# Patient Record
Sex: FEMALE | Race: BLACK OR AFRICAN AMERICAN | NOT HISPANIC OR LATINO | URBAN - METROPOLITAN AREA
[De-identification: names, ages, dates, MRNs, and addresses within clinical notes are randomized per-mention and may not be internally consistent; named-entity substitution may affect disease eponyms.]

---

## 2017-01-28 ENCOUNTER — EMERGENCY (EMERGENCY)
Facility: HOSPITAL | Age: 52
LOS: 1 days | Discharge: HOME | End: 2017-01-28
Attending: EMERGENCY MEDICINE | Admitting: INTERNAL MEDICINE
Payer: MEDICARE

## 2017-01-28 VITALS
HEIGHT: 65 IN | OXYGEN SATURATION: 99 % | WEIGHT: 115.08 LBS | TEMPERATURE: 98 F | DIASTOLIC BLOOD PRESSURE: 64 MMHG | HEART RATE: 60 BPM | SYSTOLIC BLOOD PRESSURE: 127 MMHG | RESPIRATION RATE: 17 BRPM

## 2017-01-28 VITALS
OXYGEN SATURATION: 100 % | SYSTOLIC BLOOD PRESSURE: 119 MMHG | HEART RATE: 52 BPM | DIASTOLIC BLOOD PRESSURE: 81 MMHG | RESPIRATION RATE: 17 BRPM | TEMPERATURE: 98 F

## 2017-01-28 DIAGNOSIS — J45.909 UNSPECIFIED ASTHMA, UNCOMPLICATED: ICD-10-CM

## 2017-01-28 DIAGNOSIS — E78.5 HYPERLIPIDEMIA, UNSPECIFIED: ICD-10-CM

## 2017-01-28 DIAGNOSIS — Z98.890 OTHER SPECIFIED POSTPROCEDURAL STATES: ICD-10-CM

## 2017-01-28 DIAGNOSIS — Z79.82 LONG TERM (CURRENT) USE OF ASPIRIN: ICD-10-CM

## 2017-01-28 DIAGNOSIS — Z95.0 PRESENCE OF CARDIAC PACEMAKER: ICD-10-CM

## 2017-01-28 DIAGNOSIS — N83.201 UNSPECIFIED OVARIAN CYST, RIGHT SIDE: ICD-10-CM

## 2017-01-28 DIAGNOSIS — Z98.89 OTHER SPECIFIED POSTPROCEDURAL STATES: Chronic | ICD-10-CM

## 2017-01-28 DIAGNOSIS — N39.0 URINARY TRACT INFECTION, SITE NOT SPECIFIED: ICD-10-CM

## 2017-01-28 LAB
ALBUMIN SERPL ELPH-MCNC: 4.2 G/DL — SIGNIFICANT CHANGE UP (ref 3.5–5)
ALP SERPL-CCNC: 57 U/L — SIGNIFICANT CHANGE UP (ref 40–120)
ALT FLD-CCNC: 23 U/L DA — SIGNIFICANT CHANGE UP (ref 10–60)
ANION GAP SERPL CALC-SCNC: 7 MMOL/L — SIGNIFICANT CHANGE UP (ref 5–17)
APPEARANCE UR: CLEAR — SIGNIFICANT CHANGE UP
AST SERPL-CCNC: 21 U/L — SIGNIFICANT CHANGE UP (ref 10–40)
BASOPHILS # BLD AUTO: 0.1 K/UL — SIGNIFICANT CHANGE UP (ref 0–0.2)
BASOPHILS NFR BLD AUTO: 1.8 % — SIGNIFICANT CHANGE UP (ref 0–2)
BILIRUB SERPL-MCNC: 0.3 MG/DL — SIGNIFICANT CHANGE UP (ref 0.2–1.2)
BILIRUB UR-MCNC: NEGATIVE — SIGNIFICANT CHANGE UP
BUN SERPL-MCNC: 11 MG/DL — SIGNIFICANT CHANGE UP (ref 7–18)
CALCIUM SERPL-MCNC: 9.5 MG/DL — SIGNIFICANT CHANGE UP (ref 8.4–10.5)
CHLORIDE SERPL-SCNC: 106 MMOL/L — SIGNIFICANT CHANGE UP (ref 96–108)
CO2 SERPL-SCNC: 25 MMOL/L — SIGNIFICANT CHANGE UP (ref 22–31)
COLOR SPEC: YELLOW — SIGNIFICANT CHANGE UP
CREAT SERPL-MCNC: 0.83 MG/DL — SIGNIFICANT CHANGE UP (ref 0.5–1.3)
DIFF PNL FLD: ABNORMAL
EOSINOPHIL # BLD AUTO: 0.1 K/UL — SIGNIFICANT CHANGE UP (ref 0–0.5)
EOSINOPHIL NFR BLD AUTO: 2.4 % — SIGNIFICANT CHANGE UP (ref 0–6)
GLUCOSE SERPL-MCNC: 80 MG/DL — SIGNIFICANT CHANGE UP (ref 70–99)
GLUCOSE UR QL: NEGATIVE — SIGNIFICANT CHANGE UP
HCG UR QL: NEGATIVE — SIGNIFICANT CHANGE UP
HCT VFR BLD CALC: 37.9 % — SIGNIFICANT CHANGE UP (ref 34.5–45)
HGB BLD-MCNC: 11.8 G/DL — SIGNIFICANT CHANGE UP (ref 11.5–15.5)
KETONES UR-MCNC: NEGATIVE — SIGNIFICANT CHANGE UP
LEUKOCYTE ESTERASE UR-ACNC: ABNORMAL
LIDOCAIN IGE QN: 126 U/L — SIGNIFICANT CHANGE UP (ref 73–393)
LYMPHOCYTES # BLD AUTO: 1.7 K/UL — SIGNIFICANT CHANGE UP (ref 1–3.3)
LYMPHOCYTES # BLD AUTO: 52.5 % — HIGH (ref 13–44)
MCHC RBC-ENTMCNC: 25.4 PG — LOW (ref 27–34)
MCHC RBC-ENTMCNC: 31.1 GM/DL — LOW (ref 32–36)
MCV RBC AUTO: 81.6 FL — SIGNIFICANT CHANGE UP (ref 80–100)
MONOCYTES # BLD AUTO: 0.2 K/UL — SIGNIFICANT CHANGE UP (ref 0–0.9)
MONOCYTES NFR BLD AUTO: 6.8 % — SIGNIFICANT CHANGE UP (ref 2–14)
NEUTROPHILS # BLD AUTO: 1.2 K/UL — LOW (ref 1.8–7.4)
NEUTROPHILS NFR BLD AUTO: 36.4 % — LOW (ref 43–77)
NITRITE UR-MCNC: NEGATIVE — SIGNIFICANT CHANGE UP
PH UR: 6 — SIGNIFICANT CHANGE UP (ref 4.8–8)
PLATELET # BLD AUTO: 251 K/UL — SIGNIFICANT CHANGE UP (ref 150–400)
POTASSIUM SERPL-MCNC: 4.3 MMOL/L — SIGNIFICANT CHANGE UP (ref 3.5–5.3)
POTASSIUM SERPL-SCNC: 4.3 MMOL/L — SIGNIFICANT CHANGE UP (ref 3.5–5.3)
PROT SERPL-MCNC: 8.2 G/DL — SIGNIFICANT CHANGE UP (ref 6–8.3)
PROT UR-MCNC: 30 MG/DL
RBC # BLD: 4.65 M/UL — SIGNIFICANT CHANGE UP (ref 3.8–5.2)
RBC # FLD: 17.3 % — HIGH (ref 10.3–14.5)
SODIUM SERPL-SCNC: 138 MMOL/L — SIGNIFICANT CHANGE UP (ref 135–145)
SP GR SPEC: 1.02 — SIGNIFICANT CHANGE UP (ref 1.01–1.02)
UROBILINOGEN FLD QL: NEGATIVE — SIGNIFICANT CHANGE UP
WBC # BLD: 3.3 K/UL — LOW (ref 3.8–10.5)
WBC # FLD AUTO: 3.3 K/UL — LOW (ref 3.8–10.5)

## 2017-01-28 PROCEDURE — 76856 US EXAM PELVIC COMPLETE: CPT | Mod: 26

## 2017-01-28 PROCEDURE — 96375 TX/PRO/DX INJ NEW DRUG ADDON: CPT

## 2017-01-28 PROCEDURE — 83690 ASSAY OF LIPASE: CPT

## 2017-01-28 PROCEDURE — 96374 THER/PROPH/DIAG INJ IV PUSH: CPT

## 2017-01-28 PROCEDURE — 81025 URINE PREGNANCY TEST: CPT

## 2017-01-28 PROCEDURE — 85027 COMPLETE CBC AUTOMATED: CPT

## 2017-01-28 PROCEDURE — 81001 URINALYSIS AUTO W/SCOPE: CPT

## 2017-01-28 PROCEDURE — 76830 TRANSVAGINAL US NON-OB: CPT

## 2017-01-28 PROCEDURE — 99284 EMERGENCY DEPT VISIT MOD MDM: CPT | Mod: 25

## 2017-01-28 PROCEDURE — 76856 US EXAM PELVIC COMPLETE: CPT

## 2017-01-28 PROCEDURE — 80053 COMPREHEN METABOLIC PANEL: CPT

## 2017-01-28 PROCEDURE — 99285 EMERGENCY DEPT VISIT HI MDM: CPT | Mod: 25

## 2017-01-28 PROCEDURE — 76830 TRANSVAGINAL US NON-OB: CPT | Mod: 26

## 2017-01-28 RX ORDER — FAMOTIDINE 10 MG/ML
20 INJECTION INTRAVENOUS ONCE
Qty: 0 | Refills: 0 | Status: COMPLETED | OUTPATIENT
Start: 2017-01-28 | End: 2017-01-28

## 2017-01-28 RX ORDER — SODIUM CHLORIDE 9 MG/ML
3 INJECTION INTRAMUSCULAR; INTRAVENOUS; SUBCUTANEOUS ONCE
Qty: 0 | Refills: 0 | Status: COMPLETED | OUTPATIENT
Start: 2017-01-28 | End: 2017-01-28

## 2017-01-28 RX ORDER — ACETAMINOPHEN 500 MG
650 TABLET ORAL ONCE
Qty: 0 | Refills: 0 | Status: COMPLETED | OUTPATIENT
Start: 2017-01-28 | End: 2017-01-28

## 2017-01-28 RX ORDER — ONDANSETRON 8 MG/1
4 TABLET, FILM COATED ORAL ONCE
Qty: 0 | Refills: 0 | Status: COMPLETED | OUTPATIENT
Start: 2017-01-28 | End: 2017-01-28

## 2017-01-28 RX ORDER — AZTREONAM 2 G
1 VIAL (EA) INJECTION
Qty: 14 | Refills: 0
Start: 2017-01-28 | End: 2017-02-04

## 2017-01-28 RX ORDER — SODIUM CHLORIDE 9 MG/ML
1000 INJECTION INTRAMUSCULAR; INTRAVENOUS; SUBCUTANEOUS ONCE
Qty: 0 | Refills: 0 | Status: COMPLETED | OUTPATIENT
Start: 2017-01-28 | End: 2017-01-28

## 2017-01-28 RX ADMIN — FAMOTIDINE 20 MILLIGRAM(S): 10 INJECTION INTRAVENOUS at 08:31

## 2017-01-28 RX ADMIN — Medication 1 TABLET(S): at 11:27

## 2017-01-28 RX ADMIN — Medication 650 MILLIGRAM(S): at 08:31

## 2017-01-28 RX ADMIN — SODIUM CHLORIDE 3 MILLILITER(S): 9 INJECTION INTRAMUSCULAR; INTRAVENOUS; SUBCUTANEOUS at 08:31

## 2017-01-28 RX ADMIN — SODIUM CHLORIDE 1000 MILLILITER(S): 9 INJECTION INTRAMUSCULAR; INTRAVENOUS; SUBCUTANEOUS at 08:32

## 2017-01-28 RX ADMIN — ONDANSETRON 4 MILLIGRAM(S): 8 TABLET, FILM COATED ORAL at 08:31

## 2017-01-28 RX ADMIN — Medication 30 MILLILITER(S): at 08:33

## 2017-01-28 NOTE — ED PROVIDER NOTE - OBJECTIVE STATEMENT
50 y/o F from home with pt w/ PMHx of anemia (not on iron), asthma, HLD, GERD, gastritis, s/p pacemaker for sick sinus syndrome ([facility]) and PSHx of myomectomy presents to the ED complaining of Right pelvic pain x 1 month worsening today and nauseas so came to ED for treatment. Pain is constant, mostly pressure but sometimes sharp, non radiating, no diaphoresis. Pt denies fevers, chills, diarrhea, dysuria, change frequency of urination. No vag bleeding or discharge. Has been told in past that she had uterine fibroids s/p myomectomy. No trauma, no travel. No meds taken. Able to eat and drink.  LMP Fabrizio 15, 2017, does not think she is pregnant.

## 2017-01-28 NOTE — ED PROVIDER NOTE - PHYSICAL EXAMINATION
GENERAL: No acute distress, non toxic  HEAD: Atraumatic, normocephalic  EARS: Externally normal, atraumatic, TMs normal bilaterally  EYES: No jaundice, not injected, no rupture, no foreign bodies  MOUTH: Moist mucous membranes, no open lesion, uvula midline without edema, no exudates, no peritonsilar abscess bilaterally.  NECK: Supple, full range of motion, no swelling, no lymphadenopathy  HEART: Regular rate and rhythm, no murmurs, no rubs, no gallops  LUNGS: Clear to auscultation bilaterally without rhonci, rales, or wheezing  ABDOMEN: Soft and non tender, no rebound, no guarding, neg murphys, neg mcburneys. No masses.   BACK/SPINE: Non tender spine in cervical/thoracic/lumbar regions, no stepoffs palpable  EXTREMITIES: No gross deformities  VASCULAR: Pulses palpable in all extremities, no pitting edema, capillary refill <2 secs  SKIN: Grossly intact without rash or open wounds  PSYCH: Alert and oriented x 3  GAIT: Normal without need for assistance

## 2017-01-28 NOTE — ED PROVIDER NOTE - DIAGNOSIS COUNSELING, MDM
conducted a detailed discussion... I had a detailed discussion with the patient regarding the historical points, exam findings, and any diagnostic results supporting the discharge/admit diagnosis.

## 2017-01-28 NOTE — ED PROVIDER NOTE - PROGRESS NOTE DETAILS
MD Moreno: I assumed care of this patient from Dr. Morillo, briefly 51F with one month of R pelvic pain also with urinary frequency.  UA + UTI, given abx.  U/s with fibroid and ovarian cysts, which is likely etiology of pain.  Re-eval with improved pain, all ttp in pelvis, no ttp over McBurney's pt, will not obtain CT today.  Has gyn for f/u. FIONA Moreno MD

## 2017-01-28 NOTE — ED ADULT NURSE REASSESSMENT NOTE - NS ED NURSE REASSESS COMMENT FT1
RECEIVED PT ON ROUNDS A&O X 3. C/O RLQ ABDOMINAL PAIN X 1 MONTH. +NAUSEA. PT SEEN AND EVALUATED BY DR. TREJO. HL#20G INSERTED IN R AC. BLOOD DRAWN AND SENT TO LAB AS ORDERED. MEDICATED AS ORDERED. SONOGRAM AND CATSCAN PENDING. WILL CONT TO MONITOR RECEIVED PT ON ROUNDS A&O X 3. C/O RLQ ABDOMINAL PAIN X 1 MONTH. +NAUSEA. PT SEEN AND EVALUATED BY DR. TREJO. HL#20G INSERTED IN R AC. BLOOD DRAWN AND SENT TO LAB AS ORDERED. MEDICATED AS ORDERED. SONOGRAM AND CATSCAN PENDING. WILL CONT TO MONITOR. GASTROVIEW GIVEN AND TOLERATED AS ORDERED

## 2017-01-28 NOTE — ED PROVIDER NOTE - CARE PLAN
Principal Discharge DX:	Pelvic pain in female Principal Discharge DX:	Ovarian cyst  Instructions for follow-up, activity and diet:	Your ultrasound shows fibroids and ovarian cysts, you also have a UTI, take the antibiotics for one week as directed.  Please follow up with your OB/Gyn in 4-5 days.  Bring your ultrasound and lab results with you for your appointment.  If you develop heavy vaginal bleeding (over 2 pads an hour for 2 hours), lightheadedness, severe abdominal pain, fever, or any other concern, please return to the ER for further evaluation.  Secondary Diagnosis:	UTI (urinary tract infection)

## 2017-01-28 NOTE — ED PROVIDER NOTE - PLAN OF CARE
Your ultrasound shows fibroids and ovarian cysts, you also have a UTI, take the antibiotics for one week as directed.  Please follow up with your OB/Gyn in 4-5 days.  Bring your ultrasound and lab results with you for your appointment.  If you develop heavy vaginal bleeding (over 2 pads an hour for 2 hours), lightheadedness, severe abdominal pain, fever, or any other concern, please return to the ER for further evaluation.

## 2017-01-28 NOTE — ED ADULT NURSE NOTE - OBJECTIVE STATEMENT
51 years old female recievd sitiing in bed alert and orineted x3, breathing sponateoulsy on room air complained of perisitent, RLQ pians for a month.Patient stated that she is also experieing frequency of urination and yellow vaginal discharge.Patient also stated that 51 years old female received sitting in bed alert and oriented x3, breathing spontaneously on room air complained of persistent, RLQ pains for a month., same worsened today. Stated she had Fibroids removed before and was told recently that she has more fibroids in-situ.  Patient stated that she is also experiencing frequency of urination and yellow vaginal discharge. Patient also stated that she has a Pacemaker in-situ since 2010, and that visited [facility] last week Wednesday because said pacemaker was malfunctioning. ED physician is aware of patient.

## 2017-01-28 NOTE — ED PROVIDER NOTE - MEDICAL DECISION MAKING DETAILS
50 yo F with multiple medical problems that presents with right low abd vs right pelvic pain, on/off x 1 month worsening today with nausea. benign exam. Will obtain labs and imaging and provide pain meds. reassess.

## 2017-01-30 RX ORDER — CEFUROXIME AXETIL 250 MG
1 TABLET ORAL
Qty: 20 | Refills: 0 | OUTPATIENT
Start: 2017-01-30 | End: 2017-02-09

## 2017-02-07 ENCOUNTER — EMERGENCY (EMERGENCY)
Facility: HOSPITAL | Age: 52
LOS: 1 days | Discharge: HOME | End: 2017-02-07
Attending: EMERGENCY MEDICINE
Payer: MEDICARE

## 2017-02-07 VITALS
HEART RATE: 84 BPM | OXYGEN SATURATION: 100 % | RESPIRATION RATE: 16 BRPM | SYSTOLIC BLOOD PRESSURE: 104 MMHG | TEMPERATURE: 97 F | DIASTOLIC BLOOD PRESSURE: 58 MMHG

## 2017-02-07 VITALS
RESPIRATION RATE: 16 BRPM | SYSTOLIC BLOOD PRESSURE: 132 MMHG | HEART RATE: 54 BPM | TEMPERATURE: 99 F | DIASTOLIC BLOOD PRESSURE: 78 MMHG | HEIGHT: 65 IN | OXYGEN SATURATION: 99 % | WEIGHT: 110.01 LBS

## 2017-02-07 DIAGNOSIS — Z98.890 OTHER SPECIFIED POSTPROCEDURAL STATES: ICD-10-CM

## 2017-02-07 DIAGNOSIS — R11.0 NAUSEA: ICD-10-CM

## 2017-02-07 DIAGNOSIS — E78.5 HYPERLIPIDEMIA, UNSPECIFIED: ICD-10-CM

## 2017-02-07 DIAGNOSIS — J45.909 UNSPECIFIED ASTHMA, UNCOMPLICATED: ICD-10-CM

## 2017-02-07 DIAGNOSIS — R07.89 OTHER CHEST PAIN: ICD-10-CM

## 2017-02-07 DIAGNOSIS — Z98.89 OTHER SPECIFIED POSTPROCEDURAL STATES: Chronic | ICD-10-CM

## 2017-02-07 LAB
ALBUMIN SERPL ELPH-MCNC: 3.6 G/DL — SIGNIFICANT CHANGE UP (ref 3.5–5)
ALP SERPL-CCNC: 56 U/L — SIGNIFICANT CHANGE UP (ref 40–120)
ALT FLD-CCNC: 18 U/L DA — SIGNIFICANT CHANGE UP (ref 10–60)
ANION GAP SERPL CALC-SCNC: 5 MMOL/L — SIGNIFICANT CHANGE UP (ref 5–17)
APTT BLD: 27.8 SEC — SIGNIFICANT CHANGE UP (ref 27.5–37.4)
AST SERPL-CCNC: 22 U/L — SIGNIFICANT CHANGE UP (ref 10–40)
BASOPHILS # BLD AUTO: 0 K/UL — SIGNIFICANT CHANGE UP (ref 0–0.2)
BASOPHILS NFR BLD AUTO: 1.1 % — SIGNIFICANT CHANGE UP (ref 0–2)
BILIRUB SERPL-MCNC: 0.2 MG/DL — SIGNIFICANT CHANGE UP (ref 0.2–1.2)
BUN SERPL-MCNC: 13 MG/DL — SIGNIFICANT CHANGE UP (ref 7–18)
CALCIUM SERPL-MCNC: 9 MG/DL — SIGNIFICANT CHANGE UP (ref 8.4–10.5)
CHLORIDE SERPL-SCNC: 106 MMOL/L — SIGNIFICANT CHANGE UP (ref 96–108)
CO2 SERPL-SCNC: 28 MMOL/L — SIGNIFICANT CHANGE UP (ref 22–31)
CREAT SERPL-MCNC: 0.76 MG/DL — SIGNIFICANT CHANGE UP (ref 0.5–1.3)
EOSINOPHIL # BLD AUTO: 0.1 K/UL — SIGNIFICANT CHANGE UP (ref 0–0.5)
EOSINOPHIL NFR BLD AUTO: 3.2 % — SIGNIFICANT CHANGE UP (ref 0–6)
GLUCOSE SERPL-MCNC: 94 MG/DL — SIGNIFICANT CHANGE UP (ref 70–99)
HCT VFR BLD CALC: 34.4 % — LOW (ref 34.5–45)
HGB BLD-MCNC: 10.5 G/DL — LOW (ref 11.5–15.5)
INR BLD: 1.07 RATIO — SIGNIFICANT CHANGE UP (ref 0.88–1.16)
LYMPHOCYTES # BLD AUTO: 2 K/UL — SIGNIFICANT CHANGE UP (ref 1–3.3)
LYMPHOCYTES # BLD AUTO: 43.5 % — SIGNIFICANT CHANGE UP (ref 13–44)
MCHC RBC-ENTMCNC: 24.9 PG — LOW (ref 27–34)
MCHC RBC-ENTMCNC: 30.5 GM/DL — LOW (ref 32–36)
MCV RBC AUTO: 81.7 FL — SIGNIFICANT CHANGE UP (ref 80–100)
MONOCYTES # BLD AUTO: 0.4 K/UL — SIGNIFICANT CHANGE UP (ref 0–0.9)
MONOCYTES NFR BLD AUTO: 7.8 % — SIGNIFICANT CHANGE UP (ref 2–14)
NEUTROPHILS # BLD AUTO: 2 K/UL — SIGNIFICANT CHANGE UP (ref 1.8–7.4)
NEUTROPHILS NFR BLD AUTO: 44.4 % — SIGNIFICANT CHANGE UP (ref 43–77)
PLATELET # BLD AUTO: 254 K/UL — SIGNIFICANT CHANGE UP (ref 150–400)
POTASSIUM SERPL-MCNC: 4.3 MMOL/L — SIGNIFICANT CHANGE UP (ref 3.5–5.3)
POTASSIUM SERPL-SCNC: 4.3 MMOL/L — SIGNIFICANT CHANGE UP (ref 3.5–5.3)
PROT SERPL-MCNC: 7.5 G/DL — SIGNIFICANT CHANGE UP (ref 6–8.3)
PROTHROM AB SERPL-ACNC: 12 SEC — SIGNIFICANT CHANGE UP (ref 10–13.1)
RBC # BLD: 4.22 M/UL — SIGNIFICANT CHANGE UP (ref 3.8–5.2)
RBC # FLD: 17 % — HIGH (ref 10.3–14.5)
SODIUM SERPL-SCNC: 139 MMOL/L — SIGNIFICANT CHANGE UP (ref 135–145)
TROPONIN I SERPL-MCNC: <0.015 NG/ML — SIGNIFICANT CHANGE UP (ref 0–0.04)
WBC # BLD: 4.6 K/UL — SIGNIFICANT CHANGE UP (ref 3.8–10.5)
WBC # FLD AUTO: 4.6 K/UL — SIGNIFICANT CHANGE UP (ref 3.8–10.5)

## 2017-02-07 PROCEDURE — 85027 COMPLETE CBC AUTOMATED: CPT

## 2017-02-07 PROCEDURE — 99283 EMERGENCY DEPT VISIT LOW MDM: CPT

## 2017-02-07 PROCEDURE — 99285 EMERGENCY DEPT VISIT HI MDM: CPT

## 2017-02-07 PROCEDURE — 93005 ELECTROCARDIOGRAM TRACING: CPT

## 2017-02-07 PROCEDURE — 85610 PROTHROMBIN TIME: CPT

## 2017-02-07 PROCEDURE — 36000 PLACE NEEDLE IN VEIN: CPT

## 2017-02-07 PROCEDURE — 85730 THROMBOPLASTIN TIME PARTIAL: CPT

## 2017-02-07 PROCEDURE — 84484 ASSAY OF TROPONIN QUANT: CPT

## 2017-02-07 PROCEDURE — 80053 COMPREHEN METABOLIC PANEL: CPT

## 2017-02-07 NOTE — ED PROVIDER NOTE - OBJECTIVE STATEMENT
52 y/o female with PMHx of Asthma, HTN and Pacemaker presents to the ED c/o CP onset today. Pt reports she woke up with nausea and sharp mid upper CP. Pt denies trouble breathing, abd pain, vomiting, palpitations, or any other complaints. NKDA. Last stress test 1 year ago. PMD: Dr. Etienne. Last transfusion 3 years, takes iron.

## 2017-02-07 NOTE — ED PROVIDER NOTE - NS ED MD SCRIBE ATTENDING SCRIBE SECTIONS
VITAL SIGNS( Pullset)/HIV/HISTORY OF PRESENT ILLNESS/REVIEW OF SYSTEMS/DISPOSITION/PAST MEDICAL/SURGICAL/SOCIAL HISTORY

## 2017-02-07 NOTE — ED PROVIDER NOTE - MEDICAL DECISION MAKING DETAILS
Pt with atypical CP, normal stress test 1 year ago, will get EKG cardiac labs, and call Dr. Rosario. Pt with atypical CP, normal stress test 1 year ago, will get EKG cardiac labs, and call Dr. Rosario.  labs normal, pt feels better.  dr álvarez aware pt was here

## 2017-02-10 ENCOUNTER — EMERGENCY (EMERGENCY)
Facility: HOSPITAL | Age: 52
LOS: 1 days | Discharge: HOME | End: 2017-02-10
Attending: EMERGENCY MEDICINE
Payer: MEDICARE

## 2017-02-10 VITALS
DIASTOLIC BLOOD PRESSURE: 63 MMHG | HEART RATE: 62 BPM | RESPIRATION RATE: 16 BRPM | WEIGHT: 110.01 LBS | SYSTOLIC BLOOD PRESSURE: 121 MMHG | OXYGEN SATURATION: 100 % | TEMPERATURE: 98 F

## 2017-02-10 DIAGNOSIS — D21.9 BENIGN NEOPLASM OF CONNECTIVE AND OTHER SOFT TISSUE, UNSPECIFIED: ICD-10-CM

## 2017-02-10 DIAGNOSIS — J45.909 UNSPECIFIED ASTHMA, UNCOMPLICATED: ICD-10-CM

## 2017-02-10 DIAGNOSIS — R07.89 OTHER CHEST PAIN: ICD-10-CM

## 2017-02-10 DIAGNOSIS — D64.9 ANEMIA, UNSPECIFIED: ICD-10-CM

## 2017-02-10 DIAGNOSIS — Z79.82 LONG TERM (CURRENT) USE OF ASPIRIN: ICD-10-CM

## 2017-02-10 DIAGNOSIS — Z95.0 PRESENCE OF CARDIAC PACEMAKER: ICD-10-CM

## 2017-02-10 DIAGNOSIS — K29.70 GASTRITIS, UNSPECIFIED, WITHOUT BLEEDING: ICD-10-CM

## 2017-02-10 DIAGNOSIS — K21.9 GASTRO-ESOPHAGEAL REFLUX DISEASE WITHOUT ESOPHAGITIS: ICD-10-CM

## 2017-02-10 DIAGNOSIS — E78.5 HYPERLIPIDEMIA, UNSPECIFIED: ICD-10-CM

## 2017-02-10 DIAGNOSIS — Z98.89 OTHER SPECIFIED POSTPROCEDURAL STATES: Chronic | ICD-10-CM

## 2017-02-10 PROCEDURE — 99284 EMERGENCY DEPT VISIT MOD MDM: CPT | Mod: 25

## 2017-02-11 VITALS
SYSTOLIC BLOOD PRESSURE: 108 MMHG | HEART RATE: 56 BPM | RESPIRATION RATE: 16 BRPM | DIASTOLIC BLOOD PRESSURE: 61 MMHG | TEMPERATURE: 98 F | OXYGEN SATURATION: 100 %

## 2017-02-11 LAB
ALBUMIN SERPL ELPH-MCNC: 3.8 G/DL — SIGNIFICANT CHANGE UP (ref 3.5–5)
ALP SERPL-CCNC: 58 U/L — SIGNIFICANT CHANGE UP (ref 40–120)
ALT FLD-CCNC: 16 U/L DA — SIGNIFICANT CHANGE UP (ref 10–60)
ANION GAP SERPL CALC-SCNC: 7 MMOL/L — SIGNIFICANT CHANGE UP (ref 5–17)
AST SERPL-CCNC: 11 U/L — SIGNIFICANT CHANGE UP (ref 10–40)
BASOPHILS # BLD AUTO: 0 K/UL — SIGNIFICANT CHANGE UP (ref 0–0.2)
BASOPHILS NFR BLD AUTO: 1.2 % — SIGNIFICANT CHANGE UP (ref 0–2)
BILIRUB SERPL-MCNC: 0.1 MG/DL — LOW (ref 0.2–1.2)
BUN SERPL-MCNC: 15 MG/DL — SIGNIFICANT CHANGE UP (ref 7–18)
CALCIUM SERPL-MCNC: 8.5 MG/DL — SIGNIFICANT CHANGE UP (ref 8.4–10.5)
CHLORIDE SERPL-SCNC: 105 MMOL/L — SIGNIFICANT CHANGE UP (ref 96–108)
CK MB BLD-MCNC: <1.7 % — SIGNIFICANT CHANGE UP (ref 0–3.5)
CK MB CFR SERPL CALC: <1 NG/ML — SIGNIFICANT CHANGE UP (ref 0–3.6)
CK SERPL-CCNC: 60 U/L — SIGNIFICANT CHANGE UP (ref 21–215)
CO2 SERPL-SCNC: 29 MMOL/L — SIGNIFICANT CHANGE UP (ref 22–31)
CREAT SERPL-MCNC: 0.71 MG/DL — SIGNIFICANT CHANGE UP (ref 0.5–1.3)
D DIMER BLD IA.RAPID-MCNC: 153 NG/ML DDU — SIGNIFICANT CHANGE UP
EOSINOPHIL # BLD AUTO: 0.1 K/UL — SIGNIFICANT CHANGE UP (ref 0–0.5)
EOSINOPHIL NFR BLD AUTO: 2 % — SIGNIFICANT CHANGE UP (ref 0–6)
GLUCOSE SERPL-MCNC: 96 MG/DL — SIGNIFICANT CHANGE UP (ref 70–99)
HCT VFR BLD CALC: 29.8 % — LOW (ref 34.5–45)
HGB BLD-MCNC: 9.3 G/DL — LOW (ref 11.5–15.5)
LIDOCAIN IGE QN: 168 U/L — SIGNIFICANT CHANGE UP (ref 73–393)
LYMPHOCYTES # BLD AUTO: 1.9 K/UL — SIGNIFICANT CHANGE UP (ref 1–3.3)
LYMPHOCYTES # BLD AUTO: 46.6 % — HIGH (ref 13–44)
MCHC RBC-ENTMCNC: 25.4 PG — LOW (ref 27–34)
MCHC RBC-ENTMCNC: 31.1 GM/DL — LOW (ref 32–36)
MCV RBC AUTO: 81.4 FL — SIGNIFICANT CHANGE UP (ref 80–100)
MONOCYTES # BLD AUTO: 0.4 K/UL — SIGNIFICANT CHANGE UP (ref 0–0.9)
MONOCYTES NFR BLD AUTO: 8.6 % — SIGNIFICANT CHANGE UP (ref 2–14)
NEUTROPHILS # BLD AUTO: 1.7 K/UL — LOW (ref 1.8–7.4)
NEUTROPHILS NFR BLD AUTO: 41.5 % — LOW (ref 43–77)
PLATELET # BLD AUTO: 254 K/UL — SIGNIFICANT CHANGE UP (ref 150–400)
POTASSIUM SERPL-MCNC: 4 MMOL/L — SIGNIFICANT CHANGE UP (ref 3.5–5.3)
POTASSIUM SERPL-SCNC: 4 MMOL/L — SIGNIFICANT CHANGE UP (ref 3.5–5.3)
PROT SERPL-MCNC: 7.4 G/DL — SIGNIFICANT CHANGE UP (ref 6–8.3)
RBC # BLD: 3.67 M/UL — LOW (ref 3.8–5.2)
RBC # FLD: 16.5 % — HIGH (ref 10.3–14.5)
SODIUM SERPL-SCNC: 141 MMOL/L — SIGNIFICANT CHANGE UP (ref 135–145)
TROPONIN I SERPL-MCNC: <0.015 NG/ML — SIGNIFICANT CHANGE UP (ref 0–0.04)
WBC # BLD: 4.1 K/UL — SIGNIFICANT CHANGE UP (ref 3.8–10.5)
WBC # FLD AUTO: 4.1 K/UL — SIGNIFICANT CHANGE UP (ref 3.8–10.5)

## 2017-02-11 PROCEDURE — 85379 FIBRIN DEGRADATION QUANT: CPT

## 2017-02-11 PROCEDURE — 71020: CPT | Mod: 26

## 2017-02-11 PROCEDURE — 82553 CREATINE MB FRACTION: CPT

## 2017-02-11 PROCEDURE — 80053 COMPREHEN METABOLIC PANEL: CPT

## 2017-02-11 PROCEDURE — 85027 COMPLETE CBC AUTOMATED: CPT

## 2017-02-11 PROCEDURE — 84484 ASSAY OF TROPONIN QUANT: CPT

## 2017-02-11 PROCEDURE — 82550 ASSAY OF CK (CPK): CPT

## 2017-02-11 PROCEDURE — 71046 X-RAY EXAM CHEST 2 VIEWS: CPT

## 2017-02-11 PROCEDURE — 99283 EMERGENCY DEPT VISIT LOW MDM: CPT | Mod: 25

## 2017-02-11 PROCEDURE — 93005 ELECTROCARDIOGRAM TRACING: CPT

## 2017-02-11 PROCEDURE — 83690 ASSAY OF LIPASE: CPT

## 2017-02-11 RX ORDER — ACETAMINOPHEN 500 MG
650 TABLET ORAL ONCE
Qty: 0 | Refills: 0 | Status: COMPLETED | OUTPATIENT
Start: 2017-02-11 | End: 2017-02-11

## 2017-02-11 RX ORDER — SODIUM CHLORIDE 9 MG/ML
3 INJECTION INTRAMUSCULAR; INTRAVENOUS; SUBCUTANEOUS ONCE
Qty: 0 | Refills: 0 | Status: COMPLETED | OUTPATIENT
Start: 2017-02-11 | End: 2017-02-11

## 2017-02-11 RX ADMIN — Medication 650 MILLIGRAM(S): at 04:25

## 2017-02-11 RX ADMIN — SODIUM CHLORIDE 3 MILLILITER(S): 9 INJECTION INTRAMUSCULAR; INTRAVENOUS; SUBCUTANEOUS at 00:55

## 2017-02-11 NOTE — ED PROVIDER NOTE - NS ED MD SCRIBE ATTENDING SCRIBE SECTIONS
PAST MEDICAL/SURGICAL/SOCIAL HISTORY/HISTORY OF PRESENT ILLNESS/DISPOSITION/REVIEW OF SYSTEMS/VITAL SIGNS( Pullset)/PHYSICAL EXAM/HIV

## 2017-02-11 NOTE — ED PROVIDER NOTE - PHYSICAL EXAMINATION
MSK: (-) no chest wall tenderness, (-) no paraspinal tenderness  NEURO: NV intact, strength 5/5, sensation intact

## 2017-02-11 NOTE — ED PROVIDER NOTE - PROGRESS NOTE DETAILS
feeling better, asking tgh, lungs CTA b/l, labs, ekg, cxr all ok, will f/u w PMD, no current bleeding (menses ended yesterday, no black stools). VSS, taking iron, encouraged to take w Vitamin C for better absorption, will follow up with her PMD and GYN this week

## 2017-02-11 NOTE — ED PROVIDER NOTE - MEDICAL DECISION MAKING DETAILS
50 y/o F with CP and SOB; likely anxiety. Pt is well appearing, afebrile, and all vital signs WNL. Will order DDimer, EKG, CXR, Tylenol and reassess. 50 y/o F with CP and SOB; pt reports that she has these s/s every month at time of menses. possibly anxiety. Pt is well appearing, afebrile, and all vital signs WNL. Will order labs incl. DDimer, EKG, CXR, Tylenol and reassess.

## 2017-02-11 NOTE — ED PROVIDER NOTE - OBJECTIVE STATEMENT
50 y/o female with significant PMHx of pacemaker, asthma fibroids, anemia (on iron), GERD, gastritis, presents to the ED c/o CP and SOB x today. Pt reports L sided lower CP: not acute, pt relates CP occurs with menses. Pt with frequent visits to [facility] multiple times, for same complaint. Pt denies heavy lifting or strenuous activity, and relates that she experiences Sx every month during time of menses. Pt denies fever, pedal edema, diaphoresis, palpitations, calf pain, LOC, or any other complaints. NKDA. 52 y/o female with significant PMHx of pacemaker, asthma, fibroids, anemia (on iron), GERD, gastritis, presents to the ED c/o CP and SOB x today. Pt reports L sided lower CP: not acute, pt relates CP occurs with menses. Pt with frequent visits to [facility] multiple times, for same complaint. Pt denies heavy lifting or strenuous activity, and relates that she experiences Sx every month during time of menses. Pt states Sx does not feel like asthma. Pt denies Hx of intubation or hospitalization for asthma. Pt denies melena,  fever, pedal edema, diaphoresis, palpitations, calf pain, LOC, or any other complaints. Pt denies recent sick contacts, recent travel, long periods of immobilization or Hx of DVT/PE. NKDA. Pt relates abnormal bleeding secondary to fibroids that has needed hospitalization in the past. Was seen in [facility] many times for same Sx.  Pt was concerned for decreased in H&H; was 10 when she came in to ED, now is 9. Pt relates menses completed yesterday, pt was concerned for anemia. 50 y/o female with significant PMHx of pacemaker, asthma, fibroids, anemia (on iron), GERD, gastritis, presents to the ED c/o CP and SOB x today. Pt reports L sided lower CP: not acute, pt relates CP occurs every month with menses. Pt with frequent visits to [facility] multiple times, for same complaint. Pt denies heavy lifting or strenuous activity, and relates that she experiences Sx every month during time of menses. Pt states Sx does not feel like asthma. Pt denies Hx of intubation or hospitalization for asthma. Pt denies melena,  fever, pedal edema, diaphoresis, palpitations, calf pain, LOC, or any other complaints. Pt denies recent sick contacts, recent travel, long periods of immobilization or Hx of DVT/PE. NKDA. Pt relates abnormal bleeding secondary to fibroids that has needed hospitalization in the past. Was seen in [facility] many times for same Sx.  Pt was concerned for decreased in H&H; was 10 when she came in to ED, now is 9. Pt relates menses completed yesterday, pt was concerned for anemia.

## 2017-02-11 NOTE — ED PROVIDER NOTE - MUSCULOSKELETAL, MLM
Spine appears normal, range of motion is not limited, no muscle or joint tenderness Spine appears normal, range of motion is not limited, no muscle or joint tenderness, no chest wall ttp

## 2018-05-10 ENCOUNTER — EMERGENCY (EMERGENCY)
Facility: HOSPITAL | Age: 53
LOS: 1 days | Discharge: HOME | End: 2018-05-10
Attending: EMERGENCY MEDICINE | Admitting: EMERGENCY MEDICINE
Payer: MEDICARE

## 2018-05-10 VITALS
TEMPERATURE: 97 F | DIASTOLIC BLOOD PRESSURE: 61 MMHG | RESPIRATION RATE: 16 BRPM | SYSTOLIC BLOOD PRESSURE: 146 MMHG | OXYGEN SATURATION: 100 % | HEART RATE: 52 BPM

## 2018-05-10 VITALS
OXYGEN SATURATION: 100 % | RESPIRATION RATE: 16 BRPM | TEMPERATURE: 98 F | SYSTOLIC BLOOD PRESSURE: 124 MMHG | DIASTOLIC BLOOD PRESSURE: 69 MMHG | HEART RATE: 57 BPM

## 2018-05-10 DIAGNOSIS — Z98.89 OTHER SPECIFIED POSTPROCEDURAL STATES: Chronic | ICD-10-CM

## 2018-05-10 LAB
APPEARANCE UR: CLEAR — SIGNIFICANT CHANGE UP
BACTERIA # UR AUTO: SIGNIFICANT CHANGE UP
BILIRUB UR-MCNC: NEGATIVE — SIGNIFICANT CHANGE UP
BLOOD UR QL VISUAL: HIGH
COLOR SPEC: SIGNIFICANT CHANGE UP
GLUCOSE UR-MCNC: NEGATIVE — SIGNIFICANT CHANGE UP
KETONES UR-MCNC: NEGATIVE — SIGNIFICANT CHANGE UP
LEUKOCYTE ESTERASE UR-ACNC: NEGATIVE — SIGNIFICANT CHANGE UP
MUCOUS THREADS # UR AUTO: SIGNIFICANT CHANGE UP
NITRITE UR-MCNC: NEGATIVE — SIGNIFICANT CHANGE UP
PH UR: 7 — SIGNIFICANT CHANGE UP (ref 4.6–8)
PROT UR-MCNC: 20 MG/DL — SIGNIFICANT CHANGE UP
RBC CASTS # UR COMP ASSIST: SIGNIFICANT CHANGE UP (ref 0–?)
SP GR SPEC: 1.02 — SIGNIFICANT CHANGE UP (ref 1–1.04)
SQUAMOUS # UR AUTO: SIGNIFICANT CHANGE UP
UROBILINOGEN FLD QL: NORMAL MG/DL — SIGNIFICANT CHANGE UP
WBC UR QL: SIGNIFICANT CHANGE UP (ref 0–?)

## 2018-05-10 PROCEDURE — 99283 EMERGENCY DEPT VISIT LOW MDM: CPT | Mod: GC

## 2018-05-10 NOTE — ED PROVIDER NOTE - PROGRESS NOTE DETAILS
Jonah OLIVERA: I received sign out on this patient.  Labs reviewed, H/H WNL.  Will dc home with close GYN follow-up for further management of fibroids.

## 2018-05-10 NOTE — ED PROVIDER NOTE - ATTENDING CONTRIBUTION TO CARE
agree with resident note  52F h/o bradycardia s/p PPM, asthma, fibroids, anemia (Hb baseline 10, on iron transfusions), GERD, gastritis p/w vaginal bleeding and lower abdominal (suprapubic and R-side) and back pain x 4 days.  Concerned because has morning vaginal spotting.  States is being worked up for fibroids possibly being malignant.  Has a GYN and can follow up with her tomorrow (she is associated with [facility]) where she had her recent US done ( 3 weeks ago)    PE: well appearing; VSS; CTAB/L; s1 s2 no m/r/g abd soft/NT/ND ext: no edema

## 2018-05-10 NOTE — ED PROVIDER NOTE - OBJECTIVE STATEMENT
52F h/o bradycardia s/p PPM, asthma, fibroids, anemia (Hb baseline 10, on iron transfusions), GERD, gastritis p/w vaginal bleeding and lower abdominal (suprapubic and R-side) and back pain x 4 days. Vaginal bleeding only in the mornings. Did US 3 weeks with OBGYN and fibroid doubled in size. Molly dysuria, hematuria, fever, CP, SOB.     LMP 4/24/18

## 2018-05-10 NOTE — ED ADULT NURSE NOTE - OBJECTIVE STATEMENT
Ambulatory stating that she has had intermittent ABD pain that started Sunday, 7/10 at present. VSS. Every morning when she urinates she has nereida red blood when she wipes but doesn't see any blood for the rest of the day. States the pain in lower abdominal and also the L flank and RLQ. Denies Hx of stones or UTI's. Fibroid has doubled in size in the passed few weeks, was supposed to follow up with HemOnc. PMH anemia. Also had pacemaker inserted in 2010 due to bradycardia. Set to 50. VSS. MD Aponte at bedside. Continuous cardiac monitoring in place. Safety maintained, needs attended, will continue to Monitor.

## 2018-05-10 NOTE — ED ADULT TRIAGE NOTE - CHIEF COMPLAINT QUOTE
Pt st" I had abd pain on Sunday....I been bleeding early in am after urinating and then it stops."" I have fibroid that I was told is growing fast seen at [facility] last week....told I need to see hemoncology...I am anemia." " I am on Iron." " 3 weeks ago my HGB was 10"

## 2018-05-10 NOTE — ED ADULT NURSE NOTE - CHIEF COMPLAINT QUOTE
Pt st" I had abd pain on Sunday....I been bleeding early in am after urinating and then it stops."" I have fibroid that I was told is growing fast seen at [facility] last week....told I need to see hemoncology...I am anemia." " I am on Iron." " 3 weeks ago my HGB was 10"

## 2018-05-11 LAB
ALBUMIN SERPL ELPH-MCNC: 4.7 G/DL — SIGNIFICANT CHANGE UP (ref 3.3–5)
ALP SERPL-CCNC: 60 U/L — SIGNIFICANT CHANGE UP (ref 40–120)
ALT FLD-CCNC: 12 U/L — SIGNIFICANT CHANGE UP (ref 4–33)
AST SERPL-CCNC: 14 U/L — SIGNIFICANT CHANGE UP (ref 4–32)
BASOPHILS # BLD AUTO: 0.03 K/UL — SIGNIFICANT CHANGE UP (ref 0–0.2)
BASOPHILS NFR BLD AUTO: 0.8 % — SIGNIFICANT CHANGE UP (ref 0–2)
BILIRUB SERPL-MCNC: < 0.2 MG/DL — LOW (ref 0.2–1.2)
BLD GP AB SCN SERPL QL: NEGATIVE — SIGNIFICANT CHANGE UP
BUN SERPL-MCNC: 15 MG/DL — SIGNIFICANT CHANGE UP (ref 7–23)
CALCIUM SERPL-MCNC: 9.7 MG/DL — SIGNIFICANT CHANGE UP (ref 8.4–10.5)
CHLORIDE SERPL-SCNC: 102 MMOL/L — SIGNIFICANT CHANGE UP (ref 98–107)
CO2 SERPL-SCNC: 26 MMOL/L — SIGNIFICANT CHANGE UP (ref 22–31)
CREAT SERPL-MCNC: 0.92 MG/DL — SIGNIFICANT CHANGE UP (ref 0.5–1.3)
EOSINOPHIL # BLD AUTO: 0.1 K/UL — SIGNIFICANT CHANGE UP (ref 0–0.5)
EOSINOPHIL NFR BLD AUTO: 2.6 % — SIGNIFICANT CHANGE UP (ref 0–6)
GLUCOSE SERPL-MCNC: 94 MG/DL — SIGNIFICANT CHANGE UP (ref 70–99)
HCT VFR BLD CALC: 35 % — SIGNIFICANT CHANGE UP (ref 34.5–45)
HGB BLD-MCNC: 10.5 G/DL — LOW (ref 11.5–15.5)
IMM GRANULOCYTES # BLD AUTO: 0.02 # — SIGNIFICANT CHANGE UP
IMM GRANULOCYTES NFR BLD AUTO: 0.5 % — SIGNIFICANT CHANGE UP (ref 0–1.5)
LYMPHOCYTES # BLD AUTO: 1.87 K/UL — SIGNIFICANT CHANGE UP (ref 1–3.3)
LYMPHOCYTES # BLD AUTO: 48.1 % — HIGH (ref 13–44)
MAGNESIUM SERPL-MCNC: 2.3 MG/DL — SIGNIFICANT CHANGE UP (ref 1.6–2.6)
MCHC RBC-ENTMCNC: 26.2 PG — LOW (ref 27–34)
MCHC RBC-ENTMCNC: 30 % — LOW (ref 32–36)
MCV RBC AUTO: 87.3 FL — SIGNIFICANT CHANGE UP (ref 80–100)
MONOCYTES # BLD AUTO: 0.38 K/UL — SIGNIFICANT CHANGE UP (ref 0–0.9)
MONOCYTES NFR BLD AUTO: 9.8 % — SIGNIFICANT CHANGE UP (ref 2–14)
NEUTROPHILS # BLD AUTO: 1.49 K/UL — LOW (ref 1.8–7.4)
NEUTROPHILS NFR BLD AUTO: 38.2 % — LOW (ref 43–77)
NRBC # FLD: 0 — SIGNIFICANT CHANGE UP
PHOSPHATE SERPL-MCNC: 4 MG/DL — SIGNIFICANT CHANGE UP (ref 2.5–4.5)
PLATELET # BLD AUTO: 301 K/UL — SIGNIFICANT CHANGE UP (ref 150–400)
PMV BLD: 10.7 FL — SIGNIFICANT CHANGE UP (ref 7–13)
POTASSIUM SERPL-MCNC: 4.1 MMOL/L — SIGNIFICANT CHANGE UP (ref 3.5–5.3)
POTASSIUM SERPL-SCNC: 4.1 MMOL/L — SIGNIFICANT CHANGE UP (ref 3.5–5.3)
PROT SERPL-MCNC: 7.6 G/DL — SIGNIFICANT CHANGE UP (ref 6–8.3)
RBC # BLD: 4.01 M/UL — SIGNIFICANT CHANGE UP (ref 3.8–5.2)
RBC # FLD: 16.7 % — HIGH (ref 10.3–14.5)
RH IG SCN BLD-IMP: POSITIVE — SIGNIFICANT CHANGE UP
SODIUM SERPL-SCNC: 139 MMOL/L — SIGNIFICANT CHANGE UP (ref 135–145)
WBC # BLD: 3.89 K/UL — SIGNIFICANT CHANGE UP (ref 3.8–10.5)
WBC # FLD AUTO: 3.89 K/UL — SIGNIFICANT CHANGE UP (ref 3.8–10.5)

## 2018-05-14 ENCOUNTER — APPOINTMENT (OUTPATIENT)
Dept: OBGYN | Facility: HOSPITAL | Age: 53
End: 2018-05-14

## 2018-06-21 ENCOUNTER — RECORD ABSTRACTING (OUTPATIENT)
Age: 53
End: 2018-06-21

## 2018-06-22 ENCOUNTER — APPOINTMENT (OUTPATIENT)
Dept: GYNECOLOGIC ONCOLOGY | Facility: CLINIC | Age: 53
End: 2018-06-22
Payer: MEDICARE

## 2018-06-22 VITALS
HEIGHT: 65 IN | SYSTOLIC BLOOD PRESSURE: 132 MMHG | WEIGHT: 113 LBS | HEART RATE: 56 BPM | DIASTOLIC BLOOD PRESSURE: 73 MMHG | BODY MASS INDEX: 18.83 KG/M2

## 2018-06-22 DIAGNOSIS — Z80.9 FAMILY HISTORY OF MALIGNANT NEOPLASM, UNSPECIFIED: ICD-10-CM

## 2018-06-22 DIAGNOSIS — D25.9 LEIOMYOMA OF UTERUS, UNSPECIFIED: ICD-10-CM

## 2018-06-22 DIAGNOSIS — Z78.9 OTHER SPECIFIED HEALTH STATUS: ICD-10-CM

## 2018-06-22 DIAGNOSIS — N83.291 OTHER OVARIAN CYST, RIGHT SIDE: ICD-10-CM

## 2018-06-22 PROCEDURE — 99204 OFFICE O/P NEW MOD 45 MIN: CPT

## 2018-06-22 RX ORDER — NAPROXEN 500 MG/1
500 TABLET ORAL
Qty: 30 | Refills: 0 | Status: COMPLETED | COMMUNITY
Start: 2018-05-27 | End: 2018-06-22

## 2018-06-22 RX ORDER — BISACODYL 5 MG/1
5 TABLET ORAL
Qty: 2 | Refills: 0 | Status: COMPLETED | COMMUNITY
Start: 2018-04-16 | End: 2018-06-22

## 2018-06-22 RX ORDER — POLYETHYLENE GLYCOL-3350 AND ELECTROLYTES 236; 6.74; 5.86; 2.97; 22.74 G/274.31G; G/274.31G; G/274.31G; G/274.31G; G/274.31G
236 POWDER, FOR SOLUTION ORAL
Qty: 4000 | Refills: 0 | Status: COMPLETED | COMMUNITY
Start: 2018-06-03 | End: 2018-06-22

## 2018-09-21 ENCOUNTER — APPOINTMENT (OUTPATIENT)
Dept: GYNECOLOGIC ONCOLOGY | Facility: CLINIC | Age: 53
End: 2018-09-21

## 2018-12-03 ENCOUNTER — INPATIENT (INPATIENT)
Facility: HOSPITAL | Age: 53
LOS: 0 days | Discharge: HOME | DRG: 313 | End: 2018-12-04
Attending: INTERNAL MEDICINE | Admitting: INTERNAL MEDICINE
Payer: COMMERCIAL

## 2018-12-03 VITALS
OXYGEN SATURATION: 99 % | DIASTOLIC BLOOD PRESSURE: 80 MMHG | SYSTOLIC BLOOD PRESSURE: 129 MMHG | RESPIRATION RATE: 17 BRPM | TEMPERATURE: 98 F | HEART RATE: 51 BPM

## 2018-12-03 DIAGNOSIS — D64.9 ANEMIA, UNSPECIFIED: ICD-10-CM

## 2018-12-03 DIAGNOSIS — E78.5 HYPERLIPIDEMIA, UNSPECIFIED: ICD-10-CM

## 2018-12-03 DIAGNOSIS — I24.9 ACUTE ISCHEMIC HEART DISEASE, UNSPECIFIED: ICD-10-CM

## 2018-12-03 DIAGNOSIS — J45.909 UNSPECIFIED ASTHMA, UNCOMPLICATED: ICD-10-CM

## 2018-12-03 DIAGNOSIS — Z98.89 OTHER SPECIFIED POSTPROCEDURAL STATES: Chronic | ICD-10-CM

## 2018-12-03 DIAGNOSIS — R07.9 CHEST PAIN, UNSPECIFIED: ICD-10-CM

## 2018-12-03 DIAGNOSIS — Z95.0 PRESENCE OF CARDIAC PACEMAKER: ICD-10-CM

## 2018-12-03 DIAGNOSIS — Z29.9 ENCOUNTER FOR PROPHYLACTIC MEASURES, UNSPECIFIED: ICD-10-CM

## 2018-12-03 LAB
ALBUMIN SERPL ELPH-MCNC: 3.8 G/DL — SIGNIFICANT CHANGE UP (ref 3.5–5)
ALP SERPL-CCNC: 55 U/L — SIGNIFICANT CHANGE UP (ref 40–120)
ALT FLD-CCNC: 34 U/L DA — SIGNIFICANT CHANGE UP (ref 10–60)
ANION GAP SERPL CALC-SCNC: 5 MMOL/L — SIGNIFICANT CHANGE UP (ref 5–17)
APTT BLD: 28 SEC — SIGNIFICANT CHANGE UP (ref 27.5–36.3)
AST SERPL-CCNC: 15 U/L — SIGNIFICANT CHANGE UP (ref 10–40)
BASOPHILS # BLD AUTO: 0 K/UL — SIGNIFICANT CHANGE UP (ref 0–0.2)
BASOPHILS NFR BLD AUTO: 0.9 % — SIGNIFICANT CHANGE UP (ref 0–2)
BILIRUB SERPL-MCNC: 0.2 MG/DL — SIGNIFICANT CHANGE UP (ref 0.2–1.2)
BUN SERPL-MCNC: 16 MG/DL — SIGNIFICANT CHANGE UP (ref 7–18)
CALCIUM SERPL-MCNC: 9.5 MG/DL — SIGNIFICANT CHANGE UP (ref 8.4–10.5)
CHLORIDE SERPL-SCNC: 105 MMOL/L — SIGNIFICANT CHANGE UP (ref 96–108)
CO2 SERPL-SCNC: 28 MMOL/L — SIGNIFICANT CHANGE UP (ref 22–31)
CREAT SERPL-MCNC: 0.75 MG/DL — SIGNIFICANT CHANGE UP (ref 0.5–1.3)
EOSINOPHIL # BLD AUTO: 0.1 K/UL — SIGNIFICANT CHANGE UP (ref 0–0.5)
EOSINOPHIL NFR BLD AUTO: 2.6 % — SIGNIFICANT CHANGE UP (ref 0–6)
GLUCOSE SERPL-MCNC: 74 MG/DL — SIGNIFICANT CHANGE UP (ref 70–99)
HCT VFR BLD CALC: 42.2 % — SIGNIFICANT CHANGE UP (ref 34.5–45)
HGB BLD-MCNC: 14 G/DL — SIGNIFICANT CHANGE UP (ref 11.5–15.5)
INR BLD: 1.1 RATIO — SIGNIFICANT CHANGE UP (ref 0.88–1.16)
LYMPHOCYTES # BLD AUTO: 2.1 K/UL — SIGNIFICANT CHANGE UP (ref 1–3.3)
LYMPHOCYTES # BLD AUTO: 51 % — HIGH (ref 13–44)
MCHC RBC-ENTMCNC: 30.9 PG — SIGNIFICANT CHANGE UP (ref 27–34)
MCHC RBC-ENTMCNC: 33.3 GM/DL — SIGNIFICANT CHANGE UP (ref 32–36)
MCV RBC AUTO: 92.8 FL — SIGNIFICANT CHANGE UP (ref 80–100)
MONOCYTES # BLD AUTO: 0.3 K/UL — SIGNIFICANT CHANGE UP (ref 0–0.9)
MONOCYTES NFR BLD AUTO: 7.2 % — SIGNIFICANT CHANGE UP (ref 2–14)
NEUTROPHILS # BLD AUTO: 1.6 K/UL — LOW (ref 1.8–7.4)
NEUTROPHILS NFR BLD AUTO: 38.3 % — LOW (ref 43–77)
NT-PROBNP SERPL-SCNC: 12 PG/ML — SIGNIFICANT CHANGE UP (ref 0–125)
PLATELET # BLD AUTO: 220 K/UL — SIGNIFICANT CHANGE UP (ref 150–400)
POTASSIUM SERPL-MCNC: 4.2 MMOL/L — SIGNIFICANT CHANGE UP (ref 3.5–5.3)
POTASSIUM SERPL-SCNC: 4.2 MMOL/L — SIGNIFICANT CHANGE UP (ref 3.5–5.3)
PROT SERPL-MCNC: 7.2 G/DL — SIGNIFICANT CHANGE UP (ref 6–8.3)
PROTHROM AB SERPL-ACNC: 12.3 SEC — SIGNIFICANT CHANGE UP (ref 10–12.9)
RBC # BLD: 4.55 M/UL — SIGNIFICANT CHANGE UP (ref 3.8–5.2)
RBC # FLD: 12 % — SIGNIFICANT CHANGE UP (ref 10.3–14.5)
SODIUM SERPL-SCNC: 138 MMOL/L — SIGNIFICANT CHANGE UP (ref 135–145)
TROPONIN I SERPL-MCNC: <0.015 NG/ML — SIGNIFICANT CHANGE UP (ref 0–0.04)
WBC # BLD: 4.2 K/UL — SIGNIFICANT CHANGE UP (ref 3.8–10.5)
WBC # FLD AUTO: 4.2 K/UL — SIGNIFICANT CHANGE UP (ref 3.8–10.5)

## 2018-12-03 PROCEDURE — 71046 X-RAY EXAM CHEST 2 VIEWS: CPT | Mod: 26

## 2018-12-03 PROCEDURE — 99285 EMERGENCY DEPT VISIT HI MDM: CPT

## 2018-12-03 RX ORDER — ALBUTEROL 90 UG/1
2 AEROSOL, METERED ORAL EVERY 6 HOURS
Qty: 0 | Refills: 0 | Status: DISCONTINUED | OUTPATIENT
Start: 2018-12-03 | End: 2018-12-04

## 2018-12-03 RX ORDER — ASPIRIN/CALCIUM CARB/MAGNESIUM 324 MG
81 TABLET ORAL DAILY
Qty: 0 | Refills: 0 | Status: DISCONTINUED | OUTPATIENT
Start: 2018-12-03 | End: 2018-12-04

## 2018-12-03 RX ORDER — PANTOPRAZOLE SODIUM 20 MG/1
40 TABLET, DELAYED RELEASE ORAL
Qty: 0 | Refills: 0 | Status: DISCONTINUED | OUTPATIENT
Start: 2018-12-03 | End: 2018-12-04

## 2018-12-03 RX ORDER — ATORVASTATIN CALCIUM 80 MG/1
40 TABLET, FILM COATED ORAL AT BEDTIME
Qty: 0 | Refills: 0 | Status: DISCONTINUED | OUTPATIENT
Start: 2018-12-03 | End: 2018-12-04

## 2018-12-03 RX ORDER — ASPIRIN/CALCIUM CARB/MAGNESIUM 324 MG
162 TABLET ORAL ONCE
Qty: 0 | Refills: 0 | Status: COMPLETED | OUTPATIENT
Start: 2018-12-03 | End: 2018-12-03

## 2018-12-03 NOTE — H&P ADULT - ASSESSMENT
Pt is a 53 y/o F from home with pt w/ PMHx of anemia, asthma, HLD, GERD, gastritis, s/p pacemaker for sick sinus syndrome ([facility], checked 1 year ago) and PSHx of myomectomy presents to the ED complaining of CP x 4 days.  In the ED, pt presents in no acute distress, vitals WNL, and EKG NSR.  Pt remains afebrile w/o leukocytosis.  Remaining labs WNL.  CXR clear.  Echo 2016 NSVF EF 55-60% w/ GII DD.  Given pts sig comorbidities, ACS should be ruled out.      Pt will be admitted to tele for ACS to r/o NSTEMI vs unstable angina

## 2018-12-03 NOTE — ED PROVIDER NOTE - OBJECTIVE STATEMENT
53 y/o F w/ PMHx of asthma, bradycardia, w/ pacemaker, HLD, anemia presents to ED c/o chest pain x four days, worsening today, now radiating to L arm and jaw. Pt has not had symptoms like this in the past. Pt has active chest pain. Denies any shortness of breath, cough, or any other complaints. NKDA.

## 2018-12-03 NOTE — ED PROVIDER NOTE - MEDICAL DECISION MAKING DETAILS
53 y/o F here w/ chest pain x 4 days, worsening. Pt w/ risk factors, will check labs, EKG and likely admit.

## 2018-12-03 NOTE — ED STATDOCS - OBJECTIVE STATEMENT
Telemedicine assessment was conducted (using real time 2 way audio-video technology) by Dr. Peterson located at [address] [place] [id number]  +++++++++++++++++++++++++++++++++++++++++++++++++++  History and Plan: 51 y/o F pt with PMHx of pacemaker presents to ED c/o chest pain x 4 days, now with shortness of breath. Pt describes pain as sharp, pulling, intermittent, and radiating to shoulder and face. Pt also reports nausea, lightheadedness, and dizziness. NKDA. Telemedicine assessment was conducted (using real time 2 way audio-video technology) by Dr. Peterson located at [address] [place] [id number]  +++++++++++++++++++++++++++++++++++++++++++++++++++  History and Plan: 53 y/o F pt with PMHx of PPM due to bradycardia, presents to ED c/o chest pain x 4 days, now with shortness of breath. Pt describes pain as pressure, left sided, radiating to LUE and jaw. Pt also reports nausea, lightheadedness, SOB and dizziness with the pain. NKDA.    On virtual exam pt is well appearing, nad, ekg unremarkable    Plan - Pillo

## 2018-12-03 NOTE — H&P ADULT - PROBLEM SELECTOR PLAN 1
-CP x 2 days  -EKG sinus paced  -Trop 1 neg; f/u T2 in AM, and T3 at noon  -Asa, statin, b-blocker  -serial EKG's  -Tele monitoring  -f/u Echo  -Cardio: Dr. Barksdale -CP x 2 days  -EKG sinus paced  -Trop 1 neg; f/u T2 in AM, and T3 at noon  -Asa, statin, hold b-blocker w/ concern for bradycardia   -serial EKG's  -Tele monitoring  -f/u Echo  -Cardio: Dr. Barksdale -CP x 2 days  -EKG sinus paced  -Trop 1 neg; f/u T2 in AM, and T3 at noon  -Asa, statin, hold b-blocker w/ concern for bradycardia   -serial EKG's  -Tele monitoring  -f/u Echo and stress test  -Cardio: Dr. Barksdale

## 2018-12-03 NOTE — H&P ADULT - NSHPPHYSICALEXAM_GEN_ALL_CORE
Vital Signs Last 24 Hrs  T(C): 36.7 (03 Dec 2018 18:21), Max: 36.7 (03 Dec 2018 18:21)  T(F): 98 (03 Dec 2018 18:21), Max: 98 (03 Dec 2018 18:21)  HR: 51 (03 Dec 2018 18:21) (51 - 51)  BP: 129/80 (03 Dec 2018 18:21) (129/80 - 129/80)  RR: 17 (03 Dec 2018 18:21) (17 - 17)  SpO2: 99% (03 Dec 2018 18:21) (99% - 99%)

## 2018-12-03 NOTE — H&P ADULT - PROBLEM SELECTOR PLAN 2
-[facility]- last interrogated -[facility]- last interrogated in Nov w/ Dr. Rosario  -consider repeat interrogation, or attain prior report

## 2018-12-03 NOTE — H&P ADULT - HISTORY OF PRESENT ILLNESS
Pt is a 53 y/o F from home with pt w/ PMHx of anemia, asthma, HLD, GERD, gastritis, s/p pacemaker for sick sinus syndrome ([facility], checked 1 year ago) and PSHx of myomectomy presents to the ED complaining of Pt is a 51 y/o F from home with pt w/ PMHx of anemia, asthma, HLD, GERD, gastritis, s/p pacemaker for sick sinus syndrome ([facility], checked 1 year ago) and PSHx of myomectomy presents to the ED complaining of CP x 4 days.  Pt describes the discomfort as sharp, substernal, radiating to lt side, and shoulder, w/ assoc nausea.  Pt took aspirin which provided some relief.  Pt states she had an appt with Dr. Miky Rosario today, but canceled due to worsening CP.  Pt states she had a similar episode last summer, but it resolved with aspirin.  Pt reports a negative stress test with Dr. Rosario earlier this year.  Pt's pacemaker was last interrogated in Nov.  Pt denies palpitations, dizziness, blurred vision, extremity edema, HA, abd pain, V/D, or syncope.     In the ED, pt presents in no acute distress, vitals WNL, and EKG NSR

## 2018-12-03 NOTE — H&P ADULT - NEUROLOGICAL DETAILS
responds to verbal commands/responds to pain/sensation intact/alert and oriented x 3/cranial nerves intact

## 2018-12-03 NOTE — H&P ADULT - RS GEN PE MLT RESP DETAILS PC
no rales/no wheezes/good air movement/breath sounds equal/airway patent/no rhonchi/respirations non-labored/clear to auscultation bilaterally

## 2018-12-04 ENCOUNTER — TRANSCRIPTION ENCOUNTER (OUTPATIENT)
Age: 53
End: 2018-12-04

## 2018-12-04 VITALS
SYSTOLIC BLOOD PRESSURE: 120 MMHG | TEMPERATURE: 98 F | HEART RATE: 53 BPM | OXYGEN SATURATION: 100 % | RESPIRATION RATE: 20 BRPM | DIASTOLIC BLOOD PRESSURE: 64 MMHG

## 2018-12-04 LAB
24R-OH-CALCIDIOL SERPL-MCNC: 30.4 NG/ML — SIGNIFICANT CHANGE UP (ref 30–80)
ANION GAP SERPL CALC-SCNC: 7 MMOL/L — SIGNIFICANT CHANGE UP (ref 5–17)
BUN SERPL-MCNC: 13 MG/DL — SIGNIFICANT CHANGE UP (ref 7–18)
CALCIUM SERPL-MCNC: 9.2 MG/DL — SIGNIFICANT CHANGE UP (ref 8.4–10.5)
CHLORIDE SERPL-SCNC: 105 MMOL/L — SIGNIFICANT CHANGE UP (ref 96–108)
CHOLEST SERPL-MCNC: 223 MG/DL — HIGH (ref 10–199)
CK MB BLD-MCNC: <1.6 % — SIGNIFICANT CHANGE UP (ref 0–3.5)
CK MB CFR SERPL CALC: <1 NG/ML — SIGNIFICANT CHANGE UP (ref 0–3.6)
CK SERPL-CCNC: 62 U/L — SIGNIFICANT CHANGE UP (ref 21–215)
CO2 SERPL-SCNC: 29 MMOL/L — SIGNIFICANT CHANGE UP (ref 22–31)
CREAT SERPL-MCNC: 0.78 MG/DL — SIGNIFICANT CHANGE UP (ref 0.5–1.3)
GLUCOSE SERPL-MCNC: 83 MG/DL — SIGNIFICANT CHANGE UP (ref 70–99)
HBA1C BLD-MCNC: 5.3 % — SIGNIFICANT CHANGE UP (ref 4–5.6)
HCT VFR BLD CALC: 43 % — SIGNIFICANT CHANGE UP (ref 34.5–45)
HDLC SERPL-MCNC: 63 MG/DL — SIGNIFICANT CHANGE UP
HGB BLD-MCNC: 13.7 G/DL — SIGNIFICANT CHANGE UP (ref 11.5–15.5)
LIPID PNL WITH DIRECT LDL SERPL: 146 MG/DL — SIGNIFICANT CHANGE UP
MAGNESIUM SERPL-MCNC: 2.3 MG/DL — SIGNIFICANT CHANGE UP (ref 1.6–2.6)
MCHC RBC-ENTMCNC: 30.9 PG — SIGNIFICANT CHANGE UP (ref 27–34)
MCHC RBC-ENTMCNC: 32 GM/DL — SIGNIFICANT CHANGE UP (ref 32–36)
MCV RBC AUTO: 96.6 FL — SIGNIFICANT CHANGE UP (ref 80–100)
PHOSPHATE SERPL-MCNC: 3.5 MG/DL — SIGNIFICANT CHANGE UP (ref 2.5–4.5)
PLATELET # BLD AUTO: 216 K/UL — SIGNIFICANT CHANGE UP (ref 150–400)
POTASSIUM SERPL-MCNC: 4.3 MMOL/L — SIGNIFICANT CHANGE UP (ref 3.5–5.3)
POTASSIUM SERPL-SCNC: 4.3 MMOL/L — SIGNIFICANT CHANGE UP (ref 3.5–5.3)
RBC # BLD: 4.45 M/UL — SIGNIFICANT CHANGE UP (ref 3.8–5.2)
RBC # FLD: 12 % — SIGNIFICANT CHANGE UP (ref 10.3–14.5)
SODIUM SERPL-SCNC: 141 MMOL/L — SIGNIFICANT CHANGE UP (ref 135–145)
TOTAL CHOLESTEROL/HDL RATIO MEASUREMENT: 3.5 RATIO — SIGNIFICANT CHANGE UP (ref 3.3–7.1)
TRIGL SERPL-MCNC: 72 MG/DL — SIGNIFICANT CHANGE UP (ref 10–149)
TROPONIN I SERPL-MCNC: <0.015 NG/ML — SIGNIFICANT CHANGE UP (ref 0–0.04)
TSH SERPL-MCNC: 1.07 UU/ML — SIGNIFICANT CHANGE UP (ref 0.34–4.82)
VIT B12 SERPL-MCNC: 491 PG/ML — SIGNIFICANT CHANGE UP (ref 232–1245)
WBC # BLD: 3.4 K/UL — LOW (ref 3.8–10.5)
WBC # FLD AUTO: 3.4 K/UL — LOW (ref 3.8–10.5)

## 2018-12-04 PROCEDURE — 80048 BASIC METABOLIC PNL TOTAL CA: CPT

## 2018-12-04 PROCEDURE — A9502: CPT

## 2018-12-04 PROCEDURE — 82306 VITAMIN D 25 HYDROXY: CPT

## 2018-12-04 PROCEDURE — 93017 CV STRESS TEST TRACING ONLY: CPT

## 2018-12-04 PROCEDURE — 85730 THROMBOPLASTIN TIME PARTIAL: CPT

## 2018-12-04 PROCEDURE — 78452 HT MUSCLE IMAGE SPECT MULT: CPT

## 2018-12-04 PROCEDURE — 80053 COMPREHEN METABOLIC PANEL: CPT

## 2018-12-04 PROCEDURE — 84484 ASSAY OF TROPONIN QUANT: CPT

## 2018-12-04 PROCEDURE — 83036 HEMOGLOBIN GLYCOSYLATED A1C: CPT

## 2018-12-04 PROCEDURE — 71046 X-RAY EXAM CHEST 2 VIEWS: CPT

## 2018-12-04 PROCEDURE — 82553 CREATINE MB FRACTION: CPT

## 2018-12-04 PROCEDURE — 93005 ELECTROCARDIOGRAM TRACING: CPT

## 2018-12-04 PROCEDURE — 84443 ASSAY THYROID STIM HORMONE: CPT

## 2018-12-04 PROCEDURE — 83880 ASSAY OF NATRIURETIC PEPTIDE: CPT

## 2018-12-04 PROCEDURE — 85027 COMPLETE CBC AUTOMATED: CPT

## 2018-12-04 PROCEDURE — 80061 LIPID PANEL: CPT

## 2018-12-04 PROCEDURE — 85610 PROTHROMBIN TIME: CPT

## 2018-12-04 PROCEDURE — 83735 ASSAY OF MAGNESIUM: CPT

## 2018-12-04 PROCEDURE — 99285 EMERGENCY DEPT VISIT HI MDM: CPT | Mod: 25

## 2018-12-04 PROCEDURE — 82607 VITAMIN B-12: CPT

## 2018-12-04 PROCEDURE — 84100 ASSAY OF PHOSPHORUS: CPT

## 2018-12-04 PROCEDURE — 82550 ASSAY OF CK (CPK): CPT

## 2018-12-04 RX ADMIN — Medication 81 MILLIGRAM(S): at 12:14

## 2018-12-04 RX ADMIN — PANTOPRAZOLE SODIUM 40 MILLIGRAM(S): 20 TABLET, DELAYED RELEASE ORAL at 06:55

## 2018-12-04 RX ADMIN — Medication 162 MILLIGRAM(S): at 00:24

## 2018-12-04 NOTE — DISCHARGE NOTE ADULT - MEDICATION SUMMARY - MEDICATIONS TO TAKE
I will START or STAY ON the medications listed below when I get home from the hospital:    aspirin 81 mg oral tablet, chewable  -- 1 tab(s) by mouth once a day  -- Indication: For Coronary Artery Disease     atorvastatin 40 mg oral tablet  -- 1 tab(s) by mouth once a day (at bedtime)  -- Indication: For for Cholesterol    albuterol CFC free 90 mcg/inh inhalation aerosol  -- 2 puff(s) inhaled every 6 hours, As needed, Shortness of Breath  -- Indication: For Bronchodilator    ferrous sulfate 325 mg (65 mg elemental iron) oral tablet  -- 1 tab(s) by mouth 2 times a day  -- Indication: For Supplement    pantoprazole 40 mg oral delayed release tablet  -- 1 tab(s) by mouth once a day (before a meal)  -- Indication: For For Gastric Ulcers     folic acid 1 mg oral tablet  -- 1 tab(s) by mouth once a day  -- Indication: For Supplement

## 2018-12-04 NOTE — ED ADULT NURSE NOTE - ED STAT RN HANDOFF DETAILS
Received patient from BRENDEN Newby.  Patient is alert, oriented x 4, ambulatory, on cardiac monitor Box K, paced rhythm

## 2018-12-04 NOTE — DISCHARGE NOTE ADULT - PLAN OF CARE
Resolved Follow with Dr. Barksdale as outpatient for Echocardiogram Cardiac work up negative at this time  Follow with Dr. Barksdale as outpatient Continue with cholesterol medications In place  Follow with cardiology as outpatient Follow with Dr. Barksdale as outpatient for Echocardiogram  If symptoms return or worsen, please do not hesitate to return to the emergency room.

## 2018-12-04 NOTE — DISCHARGE NOTE ADULT - CARE PLAN
Principal Discharge DX:	Chest pain  Goal:	Resolved  Assessment and plan of treatment:	Follow with Dr. Barksdale as outpatient for Echocardiogram  Secondary Diagnosis:	ACS (acute coronary syndrome)  Assessment and plan of treatment:	Cardiac work up negative at this time  Follow with Dr. Barksdale as outpatient  Secondary Diagnosis:	HLD (hyperlipidemia)  Assessment and plan of treatment:	Continue with cholesterol medications  Secondary Diagnosis:	Pacemaker  Assessment and plan of treatment:	In place  Follow with cardiology as outpatient Principal Discharge DX:	Chest pain  Goal:	Resolved  Assessment and plan of treatment:	Follow with Dr. Barksdale as outpatient for Echocardiogram  If symptoms return or worsen, please do not hesitate to return to the emergency room.  Secondary Diagnosis:	ACS (acute coronary syndrome)  Assessment and plan of treatment:	Cardiac work up negative at this time  Follow with Dr. Barksdale as outpatient  Secondary Diagnosis:	HLD (hyperlipidemia)  Assessment and plan of treatment:	Continue with cholesterol medications  Secondary Diagnosis:	Pacemaker  Assessment and plan of treatment:	In place  Follow with cardiology as outpatient

## 2018-12-04 NOTE — DISCHARGE NOTE ADULT - PATIENT PORTAL LINK FT
You can access the [facility] Patient Portal, offered by [facility], by registering with the following website: [email or web address]

## 2018-12-04 NOTE — DISCHARGE NOTE ADULT - HOSPITAL COURSE
Pt is a 51 y/o F from home with pt w/ PMHx of anemia, asthma, HLD, GERD, gastritis, s/p pacemaker for sick sinus syndrome ([facility], checked 1 year ago) and PSHx of myomectomy presents to the ED complaining of CP x 4 days.  Pt describes the discomfort as sharp, substernal, radiating to lt side, and shoulder, w/ assoc nausea.  Pt took aspirin which provided some relief.  Pt states she had an appt with Dr. Miky Rosario today, but canceled due to worsening CP.  Pt states she had a similar episode last summer, but it resolved with aspirin.  Pt reports a negative stress test with Dr. Rosario earlier this year.  Pt's pacemaker was last interrogated in Nov.  Pt denies palpitations, dizziness, blurred vision, extremity edema, HA, abd pain, V/D, or syncope.     In the ED, pt presents in no acute distress, vitals WNL, and EKG NSR

## 2018-12-04 NOTE — DISCHARGE NOTE ADULT - FINDINGS/TREATMENT
IMPRESSIONS:Normal Study  * Negative ECG evidence of ischemia after IV of Lexiscan.  * Review of raw data shows: The study is of good technical  quality.  * The left ventricle was normal in size. Normal myocardial  perfusion scan, with no evidence of infarction or  inducible ischemia.  * Gated wall motion analysis is performed, and shows  normal wall motion with post stress LVEF of 67%.

## 2018-12-04 NOTE — DISCHARGE NOTE ADULT - CARE PROVIDER_API CALL
Lilliam Barksdale), Internal Medicine  [address] [place] [address]  Phone: [phone]  Fax: [phone]

## 2018-12-04 NOTE — DISCHARGE NOTE ADULT - MEDICATION SUMMARY - MEDICATIONS TO STOP TAKING
I will STOP taking the medications listed below when I get home from the hospital:    Bactrim  mg-160 mg oral tablet  -- 1 tab(s) by mouth 2 times a day  -- Avoid prolonged or excessive exposure to direct and/or artificial sunlight while taking this medication.  Finish all this medication unless otherwise directed by prescriber.  Medication should be taken with plenty of water.    Ceftin 250 mg oral tablet  -- 1 tab(s) by mouth 2 times a day  -- Finish all this medication unless otherwise directed by prescriber.  Medication should be taken with plenty of water.  Take with food or milk.

## 2019-06-27 ENCOUNTER — EMERGENCY (EMERGENCY)
Facility: HOSPITAL | Age: 54
LOS: 1 days | Discharge: HOME | End: 2019-06-27
Attending: EMERGENCY MEDICINE
Payer: MEDICARE

## 2019-06-27 VITALS
RESPIRATION RATE: 16 BRPM | WEIGHT: 108.91 LBS | OXYGEN SATURATION: 99 % | TEMPERATURE: 98 F | SYSTOLIC BLOOD PRESSURE: 159 MMHG | HEIGHT: 65 IN | DIASTOLIC BLOOD PRESSURE: 92 MMHG | HEART RATE: 48 BPM

## 2019-06-27 DIAGNOSIS — Z98.89 OTHER SPECIFIED POSTPROCEDURAL STATES: Chronic | ICD-10-CM

## 2019-06-27 LAB
ALBUMIN SERPL ELPH-MCNC: 4.1 G/DL — SIGNIFICANT CHANGE UP (ref 3.5–5)
ALP SERPL-CCNC: 66 U/L — SIGNIFICANT CHANGE UP (ref 40–120)
ALT FLD-CCNC: 24 U/L DA — SIGNIFICANT CHANGE UP (ref 10–60)
ANION GAP SERPL CALC-SCNC: 7 MMOL/L — SIGNIFICANT CHANGE UP (ref 5–17)
APTT BLD: 27.3 SEC — LOW (ref 27.5–36.3)
AST SERPL-CCNC: 19 U/L — SIGNIFICANT CHANGE UP (ref 10–40)
BASOPHILS # BLD AUTO: 0.01 K/UL — SIGNIFICANT CHANGE UP (ref 0–0.2)
BASOPHILS NFR BLD AUTO: 0.3 % — SIGNIFICANT CHANGE UP (ref 0–2)
BILIRUB SERPL-MCNC: 0.3 MG/DL — SIGNIFICANT CHANGE UP (ref 0.2–1.2)
BUN SERPL-MCNC: 14 MG/DL — SIGNIFICANT CHANGE UP (ref 7–18)
CALCIUM SERPL-MCNC: 9.4 MG/DL — SIGNIFICANT CHANGE UP (ref 8.4–10.5)
CHLORIDE SERPL-SCNC: 105 MMOL/L — SIGNIFICANT CHANGE UP (ref 96–108)
CO2 SERPL-SCNC: 28 MMOL/L — SIGNIFICANT CHANGE UP (ref 22–31)
CREAT SERPL-MCNC: 0.95 MG/DL — SIGNIFICANT CHANGE UP (ref 0.5–1.3)
EOSINOPHIL # BLD AUTO: 0.07 K/UL — SIGNIFICANT CHANGE UP (ref 0–0.5)
EOSINOPHIL NFR BLD AUTO: 1.9 % — SIGNIFICANT CHANGE UP (ref 0–6)
GLUCOSE SERPL-MCNC: 97 MG/DL — SIGNIFICANT CHANGE UP (ref 70–99)
HCT VFR BLD CALC: 41.5 % — SIGNIFICANT CHANGE UP (ref 34.5–45)
HGB BLD-MCNC: 13.6 G/DL — SIGNIFICANT CHANGE UP (ref 11.5–15.5)
IMM GRANULOCYTES NFR BLD AUTO: 0 % — SIGNIFICANT CHANGE UP (ref 0–1.5)
INR BLD: 1.07 RATIO — SIGNIFICANT CHANGE UP (ref 0.88–1.16)
LYMPHOCYTES # BLD AUTO: 2.11 K/UL — SIGNIFICANT CHANGE UP (ref 1–3.3)
LYMPHOCYTES # BLD AUTO: 56.6 % — HIGH (ref 13–44)
MCHC RBC-ENTMCNC: 30.7 PG — SIGNIFICANT CHANGE UP (ref 27–34)
MCHC RBC-ENTMCNC: 32.8 GM/DL — SIGNIFICANT CHANGE UP (ref 32–36)
MCV RBC AUTO: 93.7 FL — SIGNIFICANT CHANGE UP (ref 80–100)
MONOCYTES # BLD AUTO: 0.33 K/UL — SIGNIFICANT CHANGE UP (ref 0–0.9)
MONOCYTES NFR BLD AUTO: 8.8 % — SIGNIFICANT CHANGE UP (ref 2–14)
NEUTROPHILS # BLD AUTO: 1.21 K/UL — LOW (ref 1.8–7.4)
NEUTROPHILS NFR BLD AUTO: 32.4 % — LOW (ref 43–77)
NRBC # BLD: 0 /100 WBCS — SIGNIFICANT CHANGE UP (ref 0–0)
PLATELET # BLD AUTO: 219 K/UL — SIGNIFICANT CHANGE UP (ref 150–400)
POTASSIUM SERPL-MCNC: 4.2 MMOL/L — SIGNIFICANT CHANGE UP (ref 3.5–5.3)
POTASSIUM SERPL-SCNC: 4.2 MMOL/L — SIGNIFICANT CHANGE UP (ref 3.5–5.3)
PROT SERPL-MCNC: 7.8 G/DL — SIGNIFICANT CHANGE UP (ref 6–8.3)
PROTHROM AB SERPL-ACNC: 11.9 SEC — SIGNIFICANT CHANGE UP (ref 10–12.9)
RBC # BLD: 4.43 M/UL — SIGNIFICANT CHANGE UP (ref 3.8–5.2)
RBC # FLD: 11.9 % — SIGNIFICANT CHANGE UP (ref 10.3–14.5)
SODIUM SERPL-SCNC: 140 MMOL/L — SIGNIFICANT CHANGE UP (ref 135–145)
TROPONIN I SERPL-MCNC: <0.015 NG/ML — SIGNIFICANT CHANGE UP (ref 0–0.04)
WBC # BLD: 3.73 K/UL — LOW (ref 3.8–10.5)
WBC # FLD AUTO: 3.73 K/UL — LOW (ref 3.8–10.5)

## 2019-06-27 PROCEDURE — 99284 EMERGENCY DEPT VISIT MOD MDM: CPT

## 2019-06-27 PROCEDURE — 71045 X-RAY EXAM CHEST 1 VIEW: CPT | Mod: 26

## 2019-06-27 PROCEDURE — 93010 ELECTROCARDIOGRAM REPORT: CPT

## 2019-06-27 NOTE — ED PROVIDER NOTE - PROGRESS NOTE DETAILS
EKG - atrial paced   labs - WBCs 3.7 EKG - atrial paced   labs - WBCs 3.7, trop negative x 2  UA - blood, no UTI   CXR - lungs clear. Bedside echo - no pericardial effusion  Pt feeling better. Repeat VS wnl. Will dc with PCP and cardio fu. Discussed indications for patient return to ED. Patient understood.

## 2019-06-27 NOTE — ED PROVIDER NOTE - OBJECTIVE STATEMENT
52 yo F pmh of pacemaker, anemia, GERD, asthma, HLD presents from home with chest pain, lower chest, some radiation to back, non pleuritic, non exertional. Associated with mild SOB and nausea. Had out pt echo last week showing trace pericardial effusion. Denies other acute complaints.

## 2019-06-27 NOTE — ED PROVIDER NOTE - NS ED ROS FT
CONSTITUTIONAL: no fever, no chills   EYES: no visual changes, no eye pain   ENMT: no nasal congestion, no throat pain  CARDIOVASCULAR: +chest pain, no edema, no palpitations   RESPIRATORY: +shortness of breath, no cough   GASTROINTESTINAL: no abdominal pain, +nausea, no vomiting, no diarrhea, no constipation   GENITOURINARY: no dysuria, no frequency  MUSCULOSKELETAL: no joint pains, no myalgias, no back pain   SKIN: no rashes  NEUROLOGICAL: no weakness, no headache, no dizziness, no slurred speech, no syncope   PSYCHIATRIC: no known mental health illness   HEME/LYMPH: no lymphadenopathy      All other ROS negative except as per HPI

## 2019-06-28 VITALS
TEMPERATURE: 98 F | DIASTOLIC BLOOD PRESSURE: 76 MMHG | OXYGEN SATURATION: 97 % | SYSTOLIC BLOOD PRESSURE: 141 MMHG | HEART RATE: 50 BPM | RESPIRATION RATE: 18 BRPM

## 2019-06-28 LAB
APPEARANCE UR: CLEAR — SIGNIFICANT CHANGE UP
BACTERIA # UR AUTO: ABNORMAL /HPF
BILIRUB UR-MCNC: NEGATIVE — SIGNIFICANT CHANGE UP
COLOR SPEC: YELLOW — SIGNIFICANT CHANGE UP
COMMENT - URINE: SIGNIFICANT CHANGE UP
DIFF PNL FLD: ABNORMAL
EPI CELLS # UR: ABNORMAL /HPF
GLUCOSE UR QL: NEGATIVE — SIGNIFICANT CHANGE UP
KETONES UR-MCNC: NEGATIVE — SIGNIFICANT CHANGE UP
LEUKOCYTE ESTERASE UR-ACNC: NEGATIVE — SIGNIFICANT CHANGE UP
NITRITE UR-MCNC: NEGATIVE — SIGNIFICANT CHANGE UP
PH UR: 6 — SIGNIFICANT CHANGE UP (ref 5–8)
PROT UR-MCNC: 15
RBC CASTS # UR COMP ASSIST: ABNORMAL /HPF (ref 0–2)
SP GR SPEC: 1.02 — SIGNIFICANT CHANGE UP (ref 1.01–1.02)
TROPONIN I SERPL-MCNC: <0.015 NG/ML — SIGNIFICANT CHANGE UP (ref 0–0.04)
UROBILINOGEN FLD QL: NEGATIVE — SIGNIFICANT CHANGE UP
WBC UR QL: SIGNIFICANT CHANGE UP /HPF (ref 0–5)

## 2019-06-28 PROCEDURE — 86900 BLOOD TYPING SEROLOGIC ABO: CPT

## 2019-06-28 PROCEDURE — 71045 X-RAY EXAM CHEST 1 VIEW: CPT

## 2019-06-28 PROCEDURE — 85730 THROMBOPLASTIN TIME PARTIAL: CPT

## 2019-06-28 PROCEDURE — 93005 ELECTROCARDIOGRAM TRACING: CPT

## 2019-06-28 PROCEDURE — 85610 PROTHROMBIN TIME: CPT

## 2019-06-28 PROCEDURE — 84484 ASSAY OF TROPONIN QUANT: CPT

## 2019-06-28 PROCEDURE — 81001 URINALYSIS AUTO W/SCOPE: CPT

## 2019-06-28 PROCEDURE — 86850 RBC ANTIBODY SCREEN: CPT

## 2019-06-28 PROCEDURE — 87086 URINE CULTURE/COLONY COUNT: CPT

## 2019-06-28 PROCEDURE — 85027 COMPLETE CBC AUTOMATED: CPT

## 2019-06-28 PROCEDURE — 36415 COLL VENOUS BLD VENIPUNCTURE: CPT

## 2019-06-28 PROCEDURE — 80053 COMPREHEN METABOLIC PANEL: CPT

## 2019-06-28 PROCEDURE — 99283 EMERGENCY DEPT VISIT LOW MDM: CPT | Mod: 25

## 2019-06-28 PROCEDURE — 86901 BLOOD TYPING SEROLOGIC RH(D): CPT

## 2019-06-28 RX ORDER — ACETAMINOPHEN 500 MG
650 TABLET ORAL ONCE
Refills: 0 | Status: COMPLETED | OUTPATIENT
Start: 2019-06-28 | End: 2019-06-28

## 2019-06-28 RX ADMIN — Medication 650 MILLIGRAM(S): at 04:12

## 2019-06-28 NOTE — ED ADULT NURSE NOTE - NSIMPLEMENTINTERV_GEN_ALL_ED
Implemented All Universal Safety Interventions:  [place] to call system. Call bell, personal items and telephone within reach. Instruct patient to call for assistance. Room bathroom lighting operational. Non-slip footwear when patient is off stretcher. Physically safe environment: no spills, clutter or unnecessary equipment. Stretcher in lowest position, wheels locked, appropriate side rails in place.

## 2019-06-29 LAB
CULTURE RESULTS: SIGNIFICANT CHANGE UP
SPECIMEN SOURCE: SIGNIFICANT CHANGE UP

## 2019-08-13 ENCOUNTER — APPOINTMENT (OUTPATIENT)
Dept: UROLOGY | Facility: CLINIC | Age: 54
End: 2019-08-13
Payer: MEDICARE

## 2019-08-13 DIAGNOSIS — R35.0 FREQUENCY OF MICTURITION: ICD-10-CM

## 2019-08-13 PROCEDURE — 99204 OFFICE O/P NEW MOD 45 MIN: CPT

## 2019-08-13 PROCEDURE — 88112 CYTOPATH CELL ENHANCE TECH: CPT | Mod: 26

## 2019-08-13 NOTE — PHYSICAL EXAM
[General Appearance - Well Developed] : well developed [General Appearance - Well Nourished] : well nourished [Normal Appearance] : normal appearance [General Appearance - In No Acute Distress] : no acute distress [Well Groomed] : well groomed [Edema] : no peripheral edema [Respiration, Rhythm And Depth] : normal respiratory rhythm and effort [Abdomen Soft] : soft [Exaggerated Use Of Accessory Muscles For Inspiration] : no accessory muscle use [Costovertebral Angle Tenderness] : no ~M costovertebral angle tenderness [Abdomen Tenderness] : non-tender [Normal Station and Gait] : the gait and station were normal for the patient's age [Urinary Bladder Findings] : the bladder was normal on palpation [] : no rash [No Focal Deficits] : no focal deficits [Oriented To Time, Place, And Person] : oriented to person, place, and time [Affect] : the affect was normal [Mood] : the mood was normal [Not Anxious] : not anxious [No Palpable Adenopathy] : no palpable adenopathy

## 2019-08-14 LAB
APPEARANCE: CLEAR
BACTERIA: NEGATIVE
BILIRUBIN URINE: NEGATIVE
BLOOD URINE: ABNORMAL
COLOR: NORMAL
GLUCOSE QUALITATIVE U: NEGATIVE
HYALINE CASTS: 1 /LPF
KETONES URINE: NEGATIVE
LEUKOCYTE ESTERASE URINE: NEGATIVE
MICROSCOPIC-UA: NORMAL
NITRITE URINE: NEGATIVE
PH URINE: 6
PROTEIN URINE: NORMAL
RED BLOOD CELLS URINE: 2 /HPF
SPECIFIC GRAVITY URINE: 1.02
SQUAMOUS EPITHELIAL CELLS: 1 /HPF
UROBILINOGEN URINE: NORMAL
WHITE BLOOD CELLS URINE: 5 /HPF

## 2019-08-16 ENCOUNTER — FORM ENCOUNTER (OUTPATIENT)
Age: 54
End: 2019-08-16

## 2019-08-17 ENCOUNTER — OUTPATIENT (OUTPATIENT)
Dept: OUTPATIENT SERVICES | Facility: HOSPITAL | Age: 54
LOS: 1 days | End: 2019-08-17
Payer: MEDICARE

## 2019-08-17 ENCOUNTER — APPOINTMENT (OUTPATIENT)
Dept: CT IMAGING | Facility: IMAGING CENTER | Age: 54
End: 2019-08-17
Payer: MEDICARE

## 2019-08-17 DIAGNOSIS — C64.9 MALIGNANT NEOPLASM OF UNSPECIFIED KIDNEY, EXCEPT RENAL PELVIS: ICD-10-CM

## 2019-08-17 DIAGNOSIS — Z98.89 OTHER SPECIFIED POSTPROCEDURAL STATES: Chronic | ICD-10-CM

## 2019-08-17 LAB — URINE CYTOLOGY: NORMAL

## 2019-08-17 PROCEDURE — 74176 CT ABD & PELVIS W/O CONTRAST: CPT

## 2019-08-17 PROCEDURE — 74176 CT ABD & PELVIS W/O CONTRAST: CPT | Mod: 26

## 2020-03-27 ENCOUNTER — EMERGENCY (EMERGENCY)
Facility: HOSPITAL | Age: 55
LOS: 1 days | Discharge: HOME | End: 2020-03-27
Attending: EMERGENCY MEDICINE
Payer: MEDICARE

## 2020-03-27 VITALS
HEART RATE: 78 BPM | SYSTOLIC BLOOD PRESSURE: 137 MMHG | TEMPERATURE: 98 F | DIASTOLIC BLOOD PRESSURE: 83 MMHG | HEIGHT: 65 IN | WEIGHT: 95.02 LBS | RESPIRATION RATE: 20 BRPM | OXYGEN SATURATION: 100 %

## 2020-03-27 DIAGNOSIS — Z98.89 OTHER SPECIFIED POSTPROCEDURAL STATES: Chronic | ICD-10-CM

## 2020-03-27 PROCEDURE — 93010 ELECTROCARDIOGRAM REPORT: CPT

## 2020-03-27 PROCEDURE — 99284 EMERGENCY DEPT VISIT MOD MDM: CPT

## 2020-03-27 RX ORDER — ACETAMINOPHEN 500 MG
650 TABLET ORAL ONCE
Refills: 0 | Status: COMPLETED | OUTPATIENT
Start: 2020-03-27 | End: 2020-03-27

## 2020-03-27 RX ADMIN — Medication 650 MILLIGRAM(S): at 22:55

## 2020-03-27 NOTE — ED PROVIDER NOTE - GENITOURINARY SPECULUM EXAM
abnormal/expand.../pt tender everywhere even reported ttp of external exam despite normal visual exam, no specific area of ttp

## 2020-03-27 NOTE — ED PROVIDER NOTE - OBJECTIVE STATEMENT
53y/o F with PMH bladder CA, HTN, HLD, asthma, anemia, parathyroid nodule, presents to ED with multiple complaints x 2 weeks. pt states she had some urinary frequency/urgency, was diagnosed with UTI and given antibiotics that she started 3 days ago. pt states today she developed vaginal pain and dysuria. denies vaginal discharge or abnormal bleeding. pt also c/o LLQ pain, epigastric pain, L lower back pain, H/A, metallic taste in mouth, subjective fevers with chills. pt has been taking 650mg tylenol for pain but states it isnt helping. pt denies any cough, sore throat, other URI/cold symptoms. denies any recent travel or known exposure to COVID or other sick contacts. pt states she is not on any chemo/radiation, is to start soon. 53y/o F with PMH bladder CA, HTN, HLD, asthma, anemia, parathyroid nodule, presents to ED with multiple complaints x 2 weeks. pt states she had some urinary frequency/urgency, was diagnosed with UTI and given macrobid that she started 3 days ago. pt states today she developed vaginal pain and dysuria. denies vaginal discharge or abnormal bleeding. pt also c/o severe LLQ pain, moderate epigastric pain, severe L lower back pain, mild intermittent CP, mild-mod H/A, metallic taste in mouth, subjective fevers with chills x 2 weeks. pt has been taking 650mg tylenol for pain but states it isnt helping. pt denies any cough, sore throat, other URI/cold symptoms. no N/V/D. denies any recent travel or known exposure to COVID or other sick contacts. pt states she is not on any chemo/radiation, is to start soon. 55y/o F with PMH bladder CA, HTN, HLD, asthma, anemia, parathyroid nodule, PPM, presents to ED with multiple complaints x 2 weeks. pt states she had some urinary frequency/urgency, was diagnosed with UTI and given macrobid that she started 3 days ago. pt states today she developed vaginal pain and dysuria. denies vaginal discharge or abnormal bleeding. pt also c/o severe LLQ pain, moderate epigastric pain, severe L lower back pain, mild intermittent CP, mild-mod H/A, metallic taste in mouth, subjective fevers with chills x 2 weeks. pt has been taking 650mg tylenol for pain but states it isnt helping. pt denies any cough, sore throat, other URI/cold symptoms. no N/V/D. denies any recent travel or known exposure to COVID or other sick contacts. pt states she is not on any chemo/radiation, is to start soon.

## 2020-03-27 NOTE — ED PROVIDER NOTE - PMH
Anemia    Asthma    Bladder cancer    HLD (hyperlipidemia)    HTN (hypertension)    Liver cyst    Pacemaker    Parathyroid tumor

## 2020-03-27 NOTE — ED ADULT NURSE NOTE - OBJECTIVE STATEMENT
54y female arrived to ED complaining of urinary frequency, recent dx of UTI on macrobid, vaginal pain, LLQ pain, lower back pain, intermittent CP, metallic taste in her mouth, headache and subjective fevers. Patient pMHx bladder CA, HTN, HLD, pacemaker, asthma, anemia, parathyroid nodule. Patient is taking 650 Tylenol at home for relief. Denies cough, n/v/d, abd pain, SOB. NO recent travel. No covid contacts.

## 2020-03-27 NOTE — ED PROVIDER NOTE - PR
"Pre-Op H&P  Pino Cano Yazell  [id number]  1993    Chief complaint: Right knee pain    HPI:    Patient is a 26 y.o.male who presents following an injury playing basketball 3 years ago to his right knee.  Plans were for physical therapy but he was noncompliant.  January of this year, his right knee \"locked up\" and has been seen in the office since that time.  Imaging revealed right knee ACL sprain.  Here today to undergo RIGHT KNEE ANTERIOR CRUCIATE LIGAMENT RECONSTRUCTION WITH HAMSTRING AUTOGRAFT, LATERAL MENISCUS REPAIR VS PARTIAL LATERAL MENISCECTOMY LOOSE BODY REMOVAL      Review of Systems:  General ROS: negative for chills, fever or skin lesions;  No changes since last office visit  Cardiovascular ROS: no chest pain or dyspnea on exertion  Respiratory ROS: no cough, shortness of breath, or wheezing    Allergies: No Known Allergies    Home Meds:    No current facility-administered medications on file prior to encounter.      No current outpatient medications on file prior to encounter.       PMH:   Past Medical History:   Diagnosis Date   • Migraine      PSH:    Past Surgical History:   Procedure Laterality Date   • WISDOM TOOTH EXTRACTION       Social History:   Tobacco:   Social History     Tobacco Use   Smoking Status Current Every Day Smoker   • Packs/day: 0.25   • Types: Cigarettes   Smokeless Tobacco Never Used      Alcohol:     Social History     Substance and Sexual Activity   Alcohol Use Yes   • Alcohol/week: 5.0 standard drinks   • Types: 5 Cans of beer per week       Vitals:           /95 (BP Location: Right arm, Patient Position: Lying)   Pulse 71   Temp 97.6 °F (36.4 °C) (Temporal)   Resp 18   Ht 177.8 cm (70\")   Wt 103 kg (226 lb)   SpO2 98%   BMI 32.43 kg/m²     Physical Exam:  General Appearance:    Alert, cooperative, no distress, appears stated age   Head:    Normocephalic, without obvious abnormality, atraumatic   Lungs:     Clear to auscultation bilaterally, respirations " unlabored    Heart:   Regular rate and rhythm, S1 and S2 normal, no murmur, rub    or gallop    Abdomen:    Soft, non-tender.  +bowel sounds   Breast Exam:    deferred   Genitalia:    deferred   Extremities:   Extremities normal, atraumatic, no cyanosis or edema   Skin:   Skin color, texture, turgor normal, no rashes or lesions   Neurologic:   Grossly intact   Results Review  LABS:  Lab Results   Component Value Date    WBC 10.72 01/30/2020    HGB 15.6 01/30/2020    HCT 48.0 01/30/2020    MCV 88.6 01/30/2020     (H) 01/30/2020    NEUTROABS 5.72 01/30/2020    GLUCOSE 86 01/30/2020    BUN 15 01/30/2020    CREATININE 0.98 01/30/2020    EGFRIFNONA 94 01/30/2020     01/30/2020    K 4.7 01/30/2020     01/30/2020    CO2 26.0 01/30/2020    CALCIUM 10.3 01/30/2020    ALBUMIN 4.80 01/30/2020    AST 15 01/30/2020    ALT 30 01/30/2020    BILITOT 0.4 01/30/2020       RADIOLOGY:  Imaging Results (Last 72 Hours)     ** No results found for the last 72 hours. **          I reviewed the patient's new clinical results.    Cancer Staging (if applicable)  Cancer Patient: __ yes _x_no __unknown; If yes, clinical stage T:__ N:__M:__, stage group or __N/A    Impression: Right knee ACL sprain    Plan: RIGHT KNEE ANTERIOR CRUCIATE LIGAMENT RECONSTRUCTION WITH HAMSTRING AUTOGRAFT, LATERAL MENISCUS REPAIR VS PARTIAL LATERAL MENISCECTOMY LOOSE BODY REMOVAL    Reanna Scott, APRN   2/3/2020   9:54 AM   138

## 2020-03-27 NOTE — ED PROVIDER NOTE - ATTENDING CONTRIBUTION TO CARE
54y F hx of bladder CA, HTN, HLD, asthma PPM for bradycardia here with L sided lower abdominal pain and L flank pain x2 weeks. Seen at [facility] for same w CT scan showing L sided obstructive uropathy 2/2 to known bladder mass. Pt is also c/o epigastric discomfort beginning today associated with metallic taste in mouth. Some relief with maalox. No trouble breathing. No cough. On exam thin, cachectic, states has been losing weight, Lung CTA BL, RRRR, no murmur, No chest wall TTP, no midline spinal TTP, no CVAT, LLQ TTP, no grr, scaphoid abdomen, ext wwp, no ext edema, cn intact. Currently being tx for UTI. Will send labs include lipase, to eval for cause for epigastric abd pain, expect acid irritation given dec PO intake, and described as burning, Check EKG, not typical for cardiac. Re lower abd/pelvic pain, pt has had for weeks with CT showing bladder mass and obstructive uropathy. Pt has urologist and is set to FU with [facility] for onc eval. Pt is voiding. Will check electrolytes, Cr. Has had numerous CTs, most recent 3/13, no indication to repeat.

## 2020-03-27 NOTE — ED PROVIDER NOTE - NSFOLLOWUPINSTRUCTIONS_ED_ALL_ED_FT
1. Stop nitrofurantoin. Start Cefpodoxime as prescribed.  Increase fluids. Take Tylenol 650mg every 6 hours and/or Motrin 600mg every 8 hours with food if needed for pain/fever.   2.  Follow up with your PMD within 48-72 hours. Follow up with your oncologist within 1 week. Follow up with your urologist within 1 week.   3. Return to the ER for any worsening pain, new fever, chills, nausea, vomiting, or other concerning symptoms that you cannot manage at home. 1. Stop nitrofurantoin. Start Cefpodoxime as prescribed.  Increase fluids. Take Tylenol 650mg every 6 hours if needed for pain/fever. Take Maalox as needed for abdominal upset.   2.  Follow up with your PMD within 48-72 hours. Follow up with your oncologist within 1 week. Follow up with your urologist within 1 week.   3. Return to the ER for any worsening pain, new fever, chills, nausea, vomiting, or other concerning symptoms that you cannot manage at home.

## 2020-03-27 NOTE — ED PROVIDER NOTE - PROGRESS NOTE DETAILS
Dr. Valdivia:  Pt had CTAP w IV contrast at [facility] on 3/13 for same c/o L sided lower abd pain and L flank pain. CT showed L sided obstructive uropathy w urinary bladder wall soft tissue nodularity as cause of obstruction. Pt has had prior cystoscopy and biopsy proven bladder CA. Recently sent all her results to [facility] and is due to FU there to explore her treatment options. Dr. Valdivia: On Macrobid, changing to cefpodoxime. pt feels better, pain improved after tylenol. aside from positive UA for UTI other labs unremarkable. pt stable for discharge as per discussion with Dr. Valdivia. Radha Fernández

## 2020-03-27 NOTE — ED PROVIDER NOTE - FAMILY HISTORY
Father  Still living? Unknown  Family history of prostate cancer, Age at diagnosis: Age Unknown  Family history of CHF (congestive heart failure), Age at diagnosis: Age Unknown  Family history of atrial fibrillation, Age at diagnosis: Age Unknown

## 2020-03-27 NOTE — ED PROVIDER NOTE - PATIENT PORTAL LINK FT
You can access the FollowMyHealth Patient Portal offered by [facility] by registering at the following website: [email or web address]. By joining [employer]’s FollowMyHealth portal, you will also be able to view your health information using other applications (apps) compatible with our system.

## 2020-03-28 VITALS — SYSTOLIC BLOOD PRESSURE: 104 MMHG | DIASTOLIC BLOOD PRESSURE: 64 MMHG

## 2020-03-28 LAB
ALBUMIN SERPL ELPH-MCNC: 4.1 G/DL — SIGNIFICANT CHANGE UP (ref 3.3–5)
ALP SERPL-CCNC: 106 U/L — SIGNIFICANT CHANGE UP (ref 40–120)
ALT FLD-CCNC: 10 U/L — SIGNIFICANT CHANGE UP (ref 10–45)
ANION GAP SERPL CALC-SCNC: 16 MMOL/L — SIGNIFICANT CHANGE UP (ref 5–17)
APPEARANCE UR: ABNORMAL
AST SERPL-CCNC: 10 U/L — SIGNIFICANT CHANGE UP (ref 10–40)
BACTERIA # UR AUTO: ABNORMAL
BASOPHILS # BLD AUTO: 0.02 K/UL — SIGNIFICANT CHANGE UP (ref 0–0.2)
BASOPHILS NFR BLD AUTO: 0.2 % — SIGNIFICANT CHANGE UP (ref 0–2)
BILIRUB SERPL-MCNC: 0.2 MG/DL — SIGNIFICANT CHANGE UP (ref 0.2–1.2)
BILIRUB UR-MCNC: NEGATIVE — SIGNIFICANT CHANGE UP
BUN SERPL-MCNC: 17 MG/DL — SIGNIFICANT CHANGE UP (ref 7–23)
CALCIUM SERPL-MCNC: 10.8 MG/DL — HIGH (ref 8.4–10.5)
CHLORIDE SERPL-SCNC: 102 MMOL/L — SIGNIFICANT CHANGE UP (ref 96–108)
CO2 SERPL-SCNC: 25 MMOL/L — SIGNIFICANT CHANGE UP (ref 22–31)
COLOR SPEC: YELLOW — SIGNIFICANT CHANGE UP
CREAT SERPL-MCNC: 1.04 MG/DL — SIGNIFICANT CHANGE UP (ref 0.5–1.3)
DIFF PNL FLD: ABNORMAL
EOSINOPHIL # BLD AUTO: 0.1 K/UL — SIGNIFICANT CHANGE UP (ref 0–0.5)
EOSINOPHIL NFR BLD AUTO: 1 % — SIGNIFICANT CHANGE UP (ref 0–6)
EPI CELLS # UR: 5 — SIGNIFICANT CHANGE UP
GLUCOSE SERPL-MCNC: 89 MG/DL — SIGNIFICANT CHANGE UP (ref 70–99)
GLUCOSE UR QL: NEGATIVE — SIGNIFICANT CHANGE UP
HCT VFR BLD CALC: 37 % — SIGNIFICANT CHANGE UP (ref 34.5–45)
HGB BLD-MCNC: 12.2 G/DL — SIGNIFICANT CHANGE UP (ref 11.5–15.5)
HYALINE CASTS # UR AUTO: 0 /LPF — SIGNIFICANT CHANGE UP (ref 0–2)
IMM GRANULOCYTES NFR BLD AUTO: 0.3 % — SIGNIFICANT CHANGE UP (ref 0–1.5)
KETONES UR-MCNC: ABNORMAL
LEUKOCYTE ESTERASE UR-ACNC: ABNORMAL
LYMPHOCYTES # BLD AUTO: 1.29 K/UL — SIGNIFICANT CHANGE UP (ref 1–3.3)
LYMPHOCYTES # BLD AUTO: 13.5 % — SIGNIFICANT CHANGE UP (ref 13–44)
MCHC RBC-ENTMCNC: 29.7 PG — SIGNIFICANT CHANGE UP (ref 27–34)
MCHC RBC-ENTMCNC: 33 GM/DL — SIGNIFICANT CHANGE UP (ref 32–36)
MCV RBC AUTO: 90 FL — SIGNIFICANT CHANGE UP (ref 80–100)
MONOCYTES # BLD AUTO: 0.61 K/UL — SIGNIFICANT CHANGE UP (ref 0–0.9)
MONOCYTES NFR BLD AUTO: 6.4 % — SIGNIFICANT CHANGE UP (ref 2–14)
NEUTROPHILS # BLD AUTO: 7.51 K/UL — HIGH (ref 1.8–7.4)
NEUTROPHILS NFR BLD AUTO: 78.6 % — HIGH (ref 43–77)
NITRITE UR-MCNC: POSITIVE
NRBC # BLD: 0 /100 WBCS — SIGNIFICANT CHANGE UP (ref 0–0)
PH UR: 6.5 — SIGNIFICANT CHANGE UP (ref 5–8)
PLATELET # BLD AUTO: 519 K/UL — HIGH (ref 150–400)
POTASSIUM SERPL-MCNC: 3.8 MMOL/L — SIGNIFICANT CHANGE UP (ref 3.5–5.3)
POTASSIUM SERPL-SCNC: 3.8 MMOL/L — SIGNIFICANT CHANGE UP (ref 3.5–5.3)
PROT SERPL-MCNC: 8.2 G/DL — SIGNIFICANT CHANGE UP (ref 6–8.3)
PROT UR-MCNC: ABNORMAL
RBC # BLD: 4.11 M/UL — SIGNIFICANT CHANGE UP (ref 3.8–5.2)
RBC # FLD: 12 % — SIGNIFICANT CHANGE UP (ref 10.3–14.5)
RBC CASTS # UR COMP ASSIST: >50 /HPF — HIGH (ref 0–4)
SODIUM SERPL-SCNC: 143 MMOL/L — SIGNIFICANT CHANGE UP (ref 135–145)
SP GR SPEC: 1.01 — SIGNIFICANT CHANGE UP (ref 1.01–1.02)
UROBILINOGEN FLD QL: NEGATIVE — SIGNIFICANT CHANGE UP
WBC # BLD: 9.56 K/UL — SIGNIFICANT CHANGE UP (ref 3.8–10.5)
WBC # FLD AUTO: 9.56 K/UL — SIGNIFICANT CHANGE UP (ref 3.8–10.5)
WBC UR QL: >50

## 2020-03-28 PROCEDURE — 93005 ELECTROCARDIOGRAM TRACING: CPT

## 2020-03-28 PROCEDURE — 99284 EMERGENCY DEPT VISIT MOD MDM: CPT | Mod: 25

## 2020-03-28 PROCEDURE — 85027 COMPLETE CBC AUTOMATED: CPT

## 2020-03-28 PROCEDURE — 83690 ASSAY OF LIPASE: CPT

## 2020-03-28 PROCEDURE — 80053 COMPREHEN METABOLIC PANEL: CPT

## 2020-03-28 PROCEDURE — 81001 URINALYSIS AUTO W/SCOPE: CPT

## 2020-03-28 PROCEDURE — 87186 SC STD MICRODIL/AGAR DIL: CPT

## 2020-03-28 PROCEDURE — 96374 THER/PROPH/DIAG INJ IV PUSH: CPT

## 2020-03-28 PROCEDURE — 87086 URINE CULTURE/COLONY COUNT: CPT

## 2020-03-28 RX ORDER — CEFPODOXIME PROXETIL 100 MG
200 TABLET ORAL ONCE
Refills: 0 | Status: COMPLETED | OUTPATIENT
Start: 2020-03-28 | End: 2020-03-28

## 2020-03-28 RX ORDER — FAMOTIDINE 10 MG/ML
20 INJECTION INTRAVENOUS ONCE
Refills: 0 | Status: COMPLETED | OUTPATIENT
Start: 2020-03-28 | End: 2020-03-28

## 2020-03-28 RX ORDER — CEFPODOXIME PROXETIL 100 MG
1 TABLET ORAL
Qty: 10 | Refills: 0
Start: 2020-03-28 | End: 2020-04-01

## 2020-03-28 RX ADMIN — Medication 650 MILLIGRAM(S): at 00:51

## 2020-03-28 RX ADMIN — Medication 200 MILLIGRAM(S): at 02:15

## 2020-03-28 RX ADMIN — FAMOTIDINE 20 MILLIGRAM(S): 10 INJECTION INTRAVENOUS at 00:50

## 2020-03-30 NOTE — ED POST DISCHARGE NOTE - ADDITIONAL DOCUMENTATION
3/30: Patient not contacted at this time with prelim results. Will wait for final sensitivities to deem if appropriate antibiotics given v. possible need to change. -Olive Tolliver PA-C

## 2020-04-17 ENCOUNTER — EMERGENCY (EMERGENCY)
Facility: HOSPITAL | Age: 55
LOS: 1 days | Discharge: HOME | End: 2020-04-17
Attending: EMERGENCY MEDICINE
Payer: MEDICARE

## 2020-04-17 VITALS
TEMPERATURE: 98 F | DIASTOLIC BLOOD PRESSURE: 81 MMHG | HEART RATE: 100 BPM | SYSTOLIC BLOOD PRESSURE: 129 MMHG | RESPIRATION RATE: 18 BRPM | OXYGEN SATURATION: 98 %

## 2020-04-17 VITALS
HEIGHT: 65 IN | RESPIRATION RATE: 18 BRPM | SYSTOLIC BLOOD PRESSURE: 135 MMHG | WEIGHT: 80.03 LBS | HEART RATE: 104 BPM | DIASTOLIC BLOOD PRESSURE: 85 MMHG | OXYGEN SATURATION: 99 % | TEMPERATURE: 97 F

## 2020-04-17 DIAGNOSIS — Z98.89 OTHER SPECIFIED POSTPROCEDURAL STATES: Chronic | ICD-10-CM

## 2020-04-17 PROBLEM — K76.89 OTHER SPECIFIED DISEASES OF LIVER: Chronic | Status: ACTIVE | Noted: 2020-03-27

## 2020-04-17 PROBLEM — D49.7 NEOPLASM OF UNSPECIFIED BEHAVIOR OF ENDOCRINE GLANDS AND OTHER PARTS OF NERVOUS SYSTEM: Chronic | Status: ACTIVE | Noted: 2020-03-27

## 2020-04-17 PROBLEM — I10 ESSENTIAL (PRIMARY) HYPERTENSION: Chronic | Status: ACTIVE | Noted: 2020-03-27

## 2020-04-17 PROBLEM — C67.9 MALIGNANT NEOPLASM OF BLADDER, UNSPECIFIED: Chronic | Status: ACTIVE | Noted: 2020-03-27

## 2020-04-17 LAB
ALBUMIN SERPL ELPH-MCNC: 3.1 G/DL — LOW (ref 3.5–5)
ALP SERPL-CCNC: 137 U/L — HIGH (ref 40–120)
ALT FLD-CCNC: 17 U/L DA — SIGNIFICANT CHANGE UP (ref 10–60)
ANION GAP SERPL CALC-SCNC: 11 MMOL/L — SIGNIFICANT CHANGE UP (ref 5–17)
APPEARANCE UR: ABNORMAL
AST SERPL-CCNC: 15 U/L — SIGNIFICANT CHANGE UP (ref 10–40)
BACTERIA # UR AUTO: ABNORMAL /HPF
BASE EXCESS BLDV CALC-SCNC: 0.5 MMOL/L — SIGNIFICANT CHANGE UP (ref -2–2)
BASOPHILS # BLD AUTO: 0.03 K/UL — SIGNIFICANT CHANGE UP (ref 0–0.2)
BASOPHILS NFR BLD AUTO: 0.3 % — SIGNIFICANT CHANGE UP (ref 0–2)
BILIRUB SERPL-MCNC: 0.4 MG/DL — SIGNIFICANT CHANGE UP (ref 0.2–1.2)
BILIRUB UR-MCNC: NEGATIVE — SIGNIFICANT CHANGE UP
BLOOD GAS COMMENTS, VENOUS: SIGNIFICANT CHANGE UP
BUN SERPL-MCNC: 33 MG/DL — HIGH (ref 7–18)
CALCIUM SERPL-MCNC: 11.2 MG/DL — HIGH (ref 8.4–10.5)
CHLORIDE SERPL-SCNC: 96 MMOL/L — SIGNIFICANT CHANGE UP (ref 96–108)
CO2 SERPL-SCNC: 24 MMOL/L — SIGNIFICANT CHANGE UP (ref 22–31)
COLOR SPEC: YELLOW — SIGNIFICANT CHANGE UP
CREAT SERPL-MCNC: 1.25 MG/DL — SIGNIFICANT CHANGE UP (ref 0.5–1.3)
D DIMER BLD IA.RAPID-MCNC: 373 NG/ML DDU — HIGH
DIFF PNL FLD: ABNORMAL
EOSINOPHIL # BLD AUTO: 0.01 K/UL — SIGNIFICANT CHANGE UP (ref 0–0.5)
EOSINOPHIL NFR BLD AUTO: 0.1 % — SIGNIFICANT CHANGE UP (ref 0–6)
EPI CELLS # UR: SIGNIFICANT CHANGE UP /HPF
GLUCOSE SERPL-MCNC: 108 MG/DL — HIGH (ref 70–99)
GLUCOSE UR QL: NEGATIVE — SIGNIFICANT CHANGE UP
HCO3 BLDV-SCNC: 26 MMOL/L — SIGNIFICANT CHANGE UP (ref 21–29)
HCT VFR BLD CALC: 41.9 % — SIGNIFICANT CHANGE UP (ref 34.5–45)
HGB BLD-MCNC: 13.7 G/DL — SIGNIFICANT CHANGE UP (ref 11.5–15.5)
HOROWITZ INDEX BLDV+IHG-RTO: 21 — SIGNIFICANT CHANGE UP
IMM GRANULOCYTES NFR BLD AUTO: 0.3 % — SIGNIFICANT CHANGE UP (ref 0–1.5)
KETONES UR-MCNC: ABNORMAL
LDH SERPL L TO P-CCNC: 153 U/L — SIGNIFICANT CHANGE UP (ref 120–225)
LEUKOCYTE ESTERASE UR-ACNC: ABNORMAL
LYMPHOCYTES # BLD AUTO: 1.43 K/UL — SIGNIFICANT CHANGE UP (ref 1–3.3)
LYMPHOCYTES # BLD AUTO: 15.2 % — SIGNIFICANT CHANGE UP (ref 13–44)
MCHC RBC-ENTMCNC: 28.1 PG — SIGNIFICANT CHANGE UP (ref 27–34)
MCHC RBC-ENTMCNC: 32.7 GM/DL — SIGNIFICANT CHANGE UP (ref 32–36)
MCV RBC AUTO: 86 FL — SIGNIFICANT CHANGE UP (ref 80–100)
MONOCYTES # BLD AUTO: 0.54 K/UL — SIGNIFICANT CHANGE UP (ref 0–0.9)
MONOCYTES NFR BLD AUTO: 5.7 % — SIGNIFICANT CHANGE UP (ref 2–14)
NEUTROPHILS # BLD AUTO: 7.38 K/UL — SIGNIFICANT CHANGE UP (ref 1.8–7.4)
NEUTROPHILS NFR BLD AUTO: 78.4 % — HIGH (ref 43–77)
NITRITE UR-MCNC: NEGATIVE — SIGNIFICANT CHANGE UP
NRBC # BLD: 0 /100 WBCS — SIGNIFICANT CHANGE UP (ref 0–0)
NT-PROBNP SERPL-SCNC: 172 PG/ML — HIGH (ref 0–125)
PCO2 BLDV: 46 MMHG — SIGNIFICANT CHANGE UP (ref 35–50)
PH BLDV: 7.37 — SIGNIFICANT CHANGE UP (ref 7.35–7.45)
PH UR: 6 — SIGNIFICANT CHANGE UP (ref 5–8)
PLATELET # BLD AUTO: 458 K/UL — HIGH (ref 150–400)
PO2 BLDV: 30 MMHG — SIGNIFICANT CHANGE UP (ref 25–45)
POTASSIUM SERPL-MCNC: 4 MMOL/L — SIGNIFICANT CHANGE UP (ref 3.5–5.3)
POTASSIUM SERPL-SCNC: 4 MMOL/L — SIGNIFICANT CHANGE UP (ref 3.5–5.3)
PROT SERPL-MCNC: 10.2 G/DL — HIGH (ref 6–8.3)
PROT UR-MCNC: 500 MG/DL
RBC # BLD: 4.87 M/UL — SIGNIFICANT CHANGE UP (ref 3.8–5.2)
RBC # FLD: 12.2 % — SIGNIFICANT CHANGE UP (ref 10.3–14.5)
RBC CASTS # UR COMP ASSIST: ABNORMAL /HPF (ref 0–2)
SAO2 % BLDV: 45 % — LOW (ref 67–88)
SARS-COV-2 RNA SPEC QL NAA+PROBE: SIGNIFICANT CHANGE UP
SODIUM SERPL-SCNC: 131 MMOL/L — LOW (ref 135–145)
SP GR SPEC: 1.01 — SIGNIFICANT CHANGE UP (ref 1.01–1.02)
TROPONIN I SERPL-MCNC: <0.015 NG/ML — SIGNIFICANT CHANGE UP (ref 0–0.04)
UROBILINOGEN FLD QL: NEGATIVE — SIGNIFICANT CHANGE UP
WBC # BLD: 9.42 K/UL — SIGNIFICANT CHANGE UP (ref 3.8–10.5)
WBC # FLD AUTO: 9.42 K/UL — SIGNIFICANT CHANGE UP (ref 3.8–10.5)
WBC UR QL: >50 /HPF (ref 0–5)

## 2020-04-17 PROCEDURE — 71045 X-RAY EXAM CHEST 1 VIEW: CPT

## 2020-04-17 PROCEDURE — 85379 FIBRIN DEGRADATION QUANT: CPT

## 2020-04-17 PROCEDURE — 86140 C-REACTIVE PROTEIN: CPT

## 2020-04-17 PROCEDURE — 84145 PROCALCITONIN (PCT): CPT

## 2020-04-17 PROCEDURE — 99284 EMERGENCY DEPT VISIT MOD MDM: CPT | Mod: 25

## 2020-04-17 PROCEDURE — 83880 ASSAY OF NATRIURETIC PEPTIDE: CPT

## 2020-04-17 PROCEDURE — 81001 URINALYSIS AUTO W/SCOPE: CPT

## 2020-04-17 PROCEDURE — 83615 LACTATE (LD) (LDH) ENZYME: CPT

## 2020-04-17 PROCEDURE — 85027 COMPLETE CBC AUTOMATED: CPT

## 2020-04-17 PROCEDURE — 96374 THER/PROPH/DIAG INJ IV PUSH: CPT | Mod: XU

## 2020-04-17 PROCEDURE — 71275 CT ANGIOGRAPHY CHEST: CPT | Mod: 26

## 2020-04-17 PROCEDURE — 71275 CT ANGIOGRAPHY CHEST: CPT

## 2020-04-17 PROCEDURE — 99284 EMERGENCY DEPT VISIT MOD MDM: CPT

## 2020-04-17 PROCEDURE — 80053 COMPREHEN METABOLIC PANEL: CPT

## 2020-04-17 PROCEDURE — 93005 ELECTROCARDIOGRAM TRACING: CPT

## 2020-04-17 PROCEDURE — 81025 URINE PREGNANCY TEST: CPT

## 2020-04-17 PROCEDURE — 82728 ASSAY OF FERRITIN: CPT

## 2020-04-17 PROCEDURE — 87635 SARS-COV-2 COVID-19 AMP PRB: CPT

## 2020-04-17 PROCEDURE — 84484 ASSAY OF TROPONIN QUANT: CPT

## 2020-04-17 PROCEDURE — 82803 BLOOD GASES ANY COMBINATION: CPT

## 2020-04-17 PROCEDURE — 36415 COLL VENOUS BLD VENIPUNCTURE: CPT

## 2020-04-17 PROCEDURE — 71045 X-RAY EXAM CHEST 1 VIEW: CPT | Mod: 26

## 2020-04-17 RX ORDER — KETOROLAC TROMETHAMINE 30 MG/ML
15 SYRINGE (ML) INJECTION ONCE
Refills: 0 | Status: DISCONTINUED | OUTPATIENT
Start: 2020-04-17 | End: 2020-04-17

## 2020-04-17 RX ORDER — MORPHINE SULFATE 50 MG/1
2 CAPSULE, EXTENDED RELEASE ORAL ONCE
Refills: 0 | Status: DISCONTINUED | OUTPATIENT
Start: 2020-04-17 | End: 2020-04-17

## 2020-04-17 RX ORDER — SODIUM CHLORIDE 9 MG/ML
1000 INJECTION INTRAMUSCULAR; INTRAVENOUS; SUBCUTANEOUS ONCE
Refills: 0 | Status: COMPLETED | OUTPATIENT
Start: 2020-04-17 | End: 2020-04-17

## 2020-04-17 RX ADMIN — SODIUM CHLORIDE 1000 MILLILITER(S): 9 INJECTION INTRAMUSCULAR; INTRAVENOUS; SUBCUTANEOUS at 15:00

## 2020-04-17 RX ADMIN — SODIUM CHLORIDE 1333.33 MILLILITER(S): 9 INJECTION INTRAMUSCULAR; INTRAVENOUS; SUBCUTANEOUS at 14:29

## 2020-04-17 RX ADMIN — Medication 15 MILLIGRAM(S): at 14:29

## 2020-04-17 NOTE — ED PROVIDER NOTE - PHYSICAL EXAMINATION
No evidence of erythema, edema or ecchymosis. No bruising, gross deformity, joint swelling, limited ROM, reduced strength, swelling or point tenderness noted. Spine: no swelling, ecchymosis, deformity or point tenderness. no visible/palpable step-off

## 2020-04-17 NOTE — ED PROVIDER NOTE - PATIENT PORTAL LINK FT
You can access the FollowMyHealth Patient Portal offered by [facility] by registering at the following website: [email or web address]. By joining [employer]’s FollowMyHealth portal, you will also be able to view your health information using other applications (apps) compatible with our system.

## 2020-04-17 NOTE — ED PROVIDER NOTE - PROGRESS NOTE DETAILS
Talked to patient's son. Talked with patient's cardiologist.   He will see her in his office Spoke to patient's Surgical oncologist at [facility], Dez Machuca. She has had one telemedicine visit with him and is awaiting biopsy to confirm cancer diagnosis. He stated that disease is very early stage and is likely not causing her weakness/dehydration/dyspnea. Clinic nurse will call her in a week to follow up with her.

## 2020-04-17 NOTE — ED PROVIDER NOTE - CLINICAL SUMMARY MEDICAL DECISION MAKING FREE TEXT BOX
54 year old female with urothelial CA, HTN, HLD, asthma, anemia, parathyroid nodule, PPM, presents to ED with multiple complaints x 3 weeks. Patient states her entire back hurts and she has difficulty breathing. Check basic labs, troponin, BNP, d-dimer, COVID PCR, CXR. Low threshold 54 year old female presented to ED for generalized weakness. Patient had elevated ddimer and had CTA chest which was negative. Labs unremarkable. She has fu next week. DC home

## 2020-04-17 NOTE — ED PROVIDER NOTE - NSFOLLOWUPINSTRUCTIONS_ED_ALL_ED_FT
Weakness    WHAT YOU NEED TO KNOW:    Weakness is a loss of muscle strength. It may be caused by brain, nerve, or muscle problems. Physical and mental conditions such as heart problems, pregnancy, dehydration, or depression may also cause weakness. Reactions to certain drugs can cause weakness. Parts of your body may become weak if you need to wear a cast or splint or have been on bed rest for a long time.    DISCHARGE INSTRUCTIONS:    Call 911 for any of the following:     You have any of the following signs of a stroke:   Numbness or drooping on one side of your face       Weakness in an arm or leg      Confusion or difficulty speaking      Dizziness, a severe headache, or vision loss      You lose feeling in your weakened body area.      You have electric shock-like feelings down your arms and legs when you flex or move your neck.      You have sudden or increased trouble speaking, swallowing, or breathing.    Return to the emergency department if:     You have severe pain in your back, arms, or legs that worsens.      You have sudden or worsened muscle weakness or loss of movement.      You are not able to control when you urinate or have a bowel movement.    Contact your healthcare provider if:     You feel depressed or anxious.       You have questions or concerns about your condition or care.     Manage weakness:     Use assistive devices as directed. These help protect you from injury. Examples include a walker or cane. Have someone install handrails in your home. These will help you get out of a bathtub or stand up from a toilet. Use a shower chair so you can sit while you shower. Sit down on the toilet or another chair to dry off and put on your clothes. Get help going up and down stairs if your legs are weak.       Go to physical or occupational therapy if directed. A physical therapist can teach you exercises to help strengthen weak muscles. An occupational therapist can show you ways to do your daily activities more easily. For example, light forks and spoons can be easier to use if you have hand weakness. You may also learn ways to organize your household items so you are not moving heavy items.      Balance rest with exercise. Exercise can help increase your muscle strength and energy. Do not exercise for long periods at a time. Take breaks often to rest. Too much exercise can cause muscle strain or make you more tired. Ask your healthcare provider how much exercise is right for you.      Eat a variety of healthy foods. Too much or too little food may cause weakness or tiredness. Ask your healthcare provider what a healthy amount of food is for you. Healthy foods include fruits, vegetables, whole-grain breads, low-fat dairy products, lean meats and fish, nuts, and cooked beans.      Do not smoke. Nicotine and other chemicals in cigarettes and cigars can make your symptoms worse, and can cause lung damage. Ask your healthcare provider for information if you currently smoke and need help to quit. E-cigarettes or smokeless tobacco still contain nicotine. Talk to your healthcare provider before you use these products.       Do not use caffeine, alcohol, or illegal drugs. These may cause muscle twitching, which could lead to worsened weakness.     Follow up with your healthcare provider as directed: Write down your questions so you remember to ask them during your visits.       © Copyright [employer] 2019 All illustrations and images included in CareNotes are the copyrighted property of [employer].A[employer]M., Inc[employer] or [employer].

## 2020-04-17 NOTE — ED PROVIDER NOTE - OBJECTIVE STATEMENT
54 year old female with urothelial CA, HTN, HLD, asthma, anemia, parathyroid nodule, PPM, presents to ED with multiple complaints x 3 weeks. Patient states her entire back hurts and she has difficulty breathing. Pt has been taking 650mg Tylenol for pain but states it isn't helping. States "toradol works well for me."    She has been going to multiple ERs and outpatient facilities for imaging and is waiting to have biopsy done at [facility] to determine treatment for urothelial cancer. 54 year old female with urothelial CA, HTN, HLD, asthma, anemia, parathyroid nodule, PPM, presents to ED with multiple complaints x 3 weeks. Patient states her entire back hurts and she has difficulty breathing. Pt has been taking 650mg Tylenol for pain but states it isn't helping. States "Toradol works well for me."    She has been going to multiple ERs and outpatient facilities for imaging and is waiting to have confirmation biopsy done at [facility] to determine treatment for urothelial cancer.

## 2020-04-17 NOTE — ED PROVIDER NOTE - CHPI ED SYMPTOMS NEG
no body aches/no diaphoresis/no chest pain/no fever/no headache/no hemoptysis/no chills/no cough/no wheezing/no edema

## 2020-04-17 NOTE — ED PROVIDER NOTE - ATTENDING CONTRIBUTION TO CARE
55 yo F with PMH of bladder CA, HTN, asthma, anemia presents to ED for 53 yo F with PMH of bladder CA, HTN, asthma, anemia presents to ED for weakness, SOB, myalgias, back pain x3 weeks. Patient was seen in the ED March 27 in the ED for similar concerns. She states she has not been feeling better. Decreased appetite.     On PE patient is cachectic. Oxygen 99% on RA. Tachycardic. Abdomen SNTND. No c-spine, t-spine, l-spine tenderness. No CVA tenderness.     Will do basic labs, UA, CXR.

## 2020-04-18 LAB
CRP SERPL-MCNC: 9.55 MG/DL — HIGH (ref 0–0.4)
FERRITIN SERPL-MCNC: 554 NG/ML — HIGH (ref 15–150)
PROCALCITONIN SERPL-MCNC: 0.12 NG/ML — HIGH (ref 0.02–0.1)

## 2020-06-13 ENCOUNTER — EMERGENCY (EMERGENCY)
Facility: HOSPITAL | Age: 55
LOS: 1 days | Discharge: HOME | End: 2020-06-13
Attending: EMERGENCY MEDICINE
Payer: MEDICARE

## 2020-06-13 VITALS
RESPIRATION RATE: 18 BRPM | SYSTOLIC BLOOD PRESSURE: 160 MMHG | TEMPERATURE: 98 F | DIASTOLIC BLOOD PRESSURE: 83 MMHG | HEART RATE: 61 BPM | OXYGEN SATURATION: 99 %

## 2020-06-13 VITALS
TEMPERATURE: 98 F | HEART RATE: 80 BPM | WEIGHT: 89.95 LBS | RESPIRATION RATE: 16 BRPM | DIASTOLIC BLOOD PRESSURE: 83 MMHG | OXYGEN SATURATION: 98 % | SYSTOLIC BLOOD PRESSURE: 149 MMHG

## 2020-06-13 DIAGNOSIS — Z98.89 OTHER SPECIFIED POSTPROCEDURAL STATES: Chronic | ICD-10-CM

## 2020-06-13 LAB
ALBUMIN SERPL ELPH-MCNC: 3.2 G/DL — LOW (ref 3.5–5)
ALP SERPL-CCNC: 104 U/L — SIGNIFICANT CHANGE UP (ref 40–120)
ALT FLD-CCNC: 13 U/L DA — SIGNIFICANT CHANGE UP (ref 10–60)
ANION GAP SERPL CALC-SCNC: 11 MMOL/L — SIGNIFICANT CHANGE UP (ref 5–17)
AST SERPL-CCNC: 25 U/L — SIGNIFICANT CHANGE UP (ref 10–40)
BASOPHILS # BLD AUTO: 0.02 K/UL — SIGNIFICANT CHANGE UP (ref 0–0.2)
BASOPHILS NFR BLD AUTO: 0.3 % — SIGNIFICANT CHANGE UP (ref 0–2)
BILIRUB SERPL-MCNC: 0.3 MG/DL — SIGNIFICANT CHANGE UP (ref 0.2–1.2)
BUN SERPL-MCNC: 25 MG/DL — HIGH (ref 7–18)
CALCIUM SERPL-MCNC: 10.6 MG/DL — HIGH (ref 8.4–10.5)
CHLORIDE SERPL-SCNC: 102 MMOL/L — SIGNIFICANT CHANGE UP (ref 96–108)
CO2 SERPL-SCNC: 23 MMOL/L — SIGNIFICANT CHANGE UP (ref 22–31)
CREAT SERPL-MCNC: 1.03 MG/DL — SIGNIFICANT CHANGE UP (ref 0.5–1.3)
EOSINOPHIL # BLD AUTO: 0.08 K/UL — SIGNIFICANT CHANGE UP (ref 0–0.5)
EOSINOPHIL NFR BLD AUTO: 1.4 % — SIGNIFICANT CHANGE UP (ref 0–6)
GLUCOSE SERPL-MCNC: 90 MG/DL — SIGNIFICANT CHANGE UP (ref 70–99)
HCT VFR BLD CALC: 33.4 % — LOW (ref 34.5–45)
HGB BLD-MCNC: 10.3 G/DL — LOW (ref 11.5–15.5)
IMM GRANULOCYTES NFR BLD AUTO: 0.3 % — SIGNIFICANT CHANGE UP (ref 0–1.5)
LIDOCAIN IGE QN: 101 U/L — SIGNIFICANT CHANGE UP (ref 73–393)
LYMPHOCYTES # BLD AUTO: 1.4 K/UL — SIGNIFICANT CHANGE UP (ref 1–3.3)
LYMPHOCYTES # BLD AUTO: 24.1 % — SIGNIFICANT CHANGE UP (ref 13–44)
MCHC RBC-ENTMCNC: 26.1 PG — LOW (ref 27–34)
MCHC RBC-ENTMCNC: 30.8 GM/DL — LOW (ref 32–36)
MCV RBC AUTO: 84.8 FL — SIGNIFICANT CHANGE UP (ref 80–100)
MONOCYTES # BLD AUTO: 0.36 K/UL — SIGNIFICANT CHANGE UP (ref 0–0.9)
MONOCYTES NFR BLD AUTO: 6.2 % — SIGNIFICANT CHANGE UP (ref 2–14)
NEUTROPHILS # BLD AUTO: 3.94 K/UL — SIGNIFICANT CHANGE UP (ref 1.8–7.4)
NEUTROPHILS NFR BLD AUTO: 67.7 % — SIGNIFICANT CHANGE UP (ref 43–77)
NRBC # BLD: 0 /100 WBCS — SIGNIFICANT CHANGE UP (ref 0–0)
PLATELET # BLD AUTO: 431 K/UL — HIGH (ref 150–400)
POTASSIUM SERPL-MCNC: 4.2 MMOL/L — SIGNIFICANT CHANGE UP (ref 3.5–5.3)
POTASSIUM SERPL-SCNC: 4.2 MMOL/L — SIGNIFICANT CHANGE UP (ref 3.5–5.3)
PROT SERPL-MCNC: 9.6 G/DL — HIGH (ref 6–8.3)
RBC # BLD: 3.94 M/UL — SIGNIFICANT CHANGE UP (ref 3.8–5.2)
RBC # FLD: 14.7 % — HIGH (ref 10.3–14.5)
SODIUM SERPL-SCNC: 136 MMOL/L — SIGNIFICANT CHANGE UP (ref 135–145)
WBC # BLD: 5.82 K/UL — SIGNIFICANT CHANGE UP (ref 3.8–10.5)
WBC # FLD AUTO: 5.82 K/UL — SIGNIFICANT CHANGE UP (ref 3.8–10.5)

## 2020-06-13 PROCEDURE — 83690 ASSAY OF LIPASE: CPT

## 2020-06-13 PROCEDURE — 96374 THER/PROPH/DIAG INJ IV PUSH: CPT

## 2020-06-13 PROCEDURE — 96376 TX/PRO/DX INJ SAME DRUG ADON: CPT

## 2020-06-13 PROCEDURE — 96375 TX/PRO/DX INJ NEW DRUG ADDON: CPT

## 2020-06-13 PROCEDURE — 80053 COMPREHEN METABOLIC PANEL: CPT

## 2020-06-13 PROCEDURE — 99284 EMERGENCY DEPT VISIT MOD MDM: CPT

## 2020-06-13 PROCEDURE — 85027 COMPLETE CBC AUTOMATED: CPT

## 2020-06-13 PROCEDURE — 99284 EMERGENCY DEPT VISIT MOD MDM: CPT | Mod: 25

## 2020-06-13 PROCEDURE — 36415 COLL VENOUS BLD VENIPUNCTURE: CPT

## 2020-06-13 RX ORDER — KETOROLAC TROMETHAMINE 30 MG/ML
15 SYRINGE (ML) INJECTION ONCE
Refills: 0 | Status: DISCONTINUED | OUTPATIENT
Start: 2020-06-13 | End: 2020-06-13

## 2020-06-13 RX ORDER — SUCRALFATE 1 G
1 TABLET ORAL ONCE
Refills: 0 | Status: DISCONTINUED | OUTPATIENT
Start: 2020-06-13 | End: 2020-06-13

## 2020-06-13 RX ORDER — FAMOTIDINE 10 MG/ML
20 INJECTION INTRAVENOUS ONCE
Refills: 0 | Status: COMPLETED | OUTPATIENT
Start: 2020-06-13 | End: 2020-06-13

## 2020-06-13 RX ORDER — SUCRALFATE 1 G
1 TABLET ORAL ONCE
Refills: 0 | Status: COMPLETED | OUTPATIENT
Start: 2020-06-13 | End: 2020-06-13

## 2020-06-13 RX ORDER — SODIUM CHLORIDE 9 MG/ML
1000 INJECTION INTRAMUSCULAR; INTRAVENOUS; SUBCUTANEOUS ONCE
Refills: 0 | Status: COMPLETED | OUTPATIENT
Start: 2020-06-13 | End: 2020-06-13

## 2020-06-13 RX ORDER — LIDOCAINE 4 G/100G
10 CREAM TOPICAL ONCE
Refills: 0 | Status: COMPLETED | OUTPATIENT
Start: 2020-06-13 | End: 2020-06-13

## 2020-06-13 RX ADMIN — LIDOCAINE 10 MILLILITER(S): 4 CREAM TOPICAL at 08:34

## 2020-06-13 RX ADMIN — SODIUM CHLORIDE 1000 MILLILITER(S): 9 INJECTION INTRAMUSCULAR; INTRAVENOUS; SUBCUTANEOUS at 06:42

## 2020-06-13 RX ADMIN — FAMOTIDINE 20 MILLIGRAM(S): 10 INJECTION INTRAVENOUS at 06:41

## 2020-06-13 RX ADMIN — Medication 15 MILLIGRAM(S): at 09:27

## 2020-06-13 RX ADMIN — Medication 30 MILLILITER(S): at 06:41

## 2020-06-13 RX ADMIN — Medication 1 GRAM(S): at 10:08

## 2020-06-13 RX ADMIN — Medication 15 MILLIGRAM(S): at 08:32

## 2020-06-13 RX ADMIN — Medication 10 MILLIGRAM(S): at 09:27

## 2020-06-13 NOTE — ED PROVIDER NOTE - TOBACCO USE
[FreeTextEntry1] : Initial hx 7/2019\par 2012 - electrical current sensation upon neck flexion X 1.5 years. did not get to a neurologist.\par 2013 - abrupt abdominal tightness and numbness. saw neurologist, had MRIs showing lesions. Admitted to [facility]. No LP. Got IVMP. \par Started following with Dr. Ellington.\par Initially started DMF but LFTs went up to ~1300 per pt. Had liver biopsy. Stopped DMF within 3m, normalized.\par 9/2014 - started copaxone. stopped copaxone in summer 2018 and off for about 6 months. Some injection reactions mostly on legs. \par [id number] - one new brain lesion. otherwise MRIs have been reportedly stable per pt.\par Early 2019 - pain and numbness down L arm/shoulder.\par 6/2019 - restarted copaxone, but stopped in 7/2019.\par Had MRI 5/2019 and reportedly no new lesions.\par Actively trying to get pregnant.\par 9/2019 - intense lhermitte's sign, has had it before but milder, worsened over past 1 month. Also with "heat" sensation on both calves L>R, no pain. thought to be a possible relapse but MRI C+T showed no new lesions. got IVMP x3 with some improvement. lhermittes lasted about 2 month.\par Dental work done in 11/2019. Soon after, developed vertigo - dizziness, strange metal taste in mouth, nausea. Thought she was pregnant but was not. Went to ENT who felt like she had BPPV but testing was negative. Some L hand clumsiness in recent weeks. This persists. New brain MRI showed 2 new PV lesions but nothing that explained new sx.\par \par Subj interval:\par \par Dizziness persists. Metallic taste in mouth persists. \par \par Currently trying to conceive. Currently not pregnant.\par \par ROS/Current Sx:\par pain in the LLE \par brain fog, word finding difficulty. worse with heat.\par occ muscle spasm - kyler horse in R calf.\par urinary urgency\par \par PMHX:\par possible meningitis as a baby.\par hashimotos thyroiditis - recently diagnosed.\par MS\par endometriosis\par \par MEDS:\par none\par \par Exam:\par \par AO3. Normally conversant. Follows commands, names, and repeats. Good attention.\par \par PERRL, VFF, EOMI, no nystagmus, face symmetric, TUP at midline.\par \par Motor: \par  R: L:\par Del 5 5\par Bi 5 5\par Tri 5 5\par Wrist Extensors 5 5\par Finger abductors 5 5\par  5 5 \par \par HF 5 5\par KE 5 5\par KF 5 5\par DF 5 5\par PF 5 5\par \par Tone R L\par UE 0 0 \par LE 0 0\par \par Sensory RUE LUE RLE LLE \par LT + + + +\par Vib + + + mod\par JPS + + + +\par PP + + + +\par Temp + + + +\par \par Reflexes:\par  R L \par Biceps 2 2\par BR 2 2\par Triceps \par Pat 3 3+ \par AJ 3 3\par \par TOES F F\par \par Coordination:\par  R L \par FTN 0 0 \par REAGAN 0 0\par HTS 0 0 \par \par Other - no loss of check\par  \par Gait: normal, can heel, toe, tandem. \par \par  Assistance: none\par \par MRI T spine 2013 - read as t12 lesion\par MRI T spine 8/2015 - read as normal\par MRI T spine 9/2019 - read as unchanged. i agree\par \par MRI brain 12/2014 - read as demyelinating lesions, improved from 2013.\par MRI brain 4/2019 - reviewed, typical appearance of lesions (PV+CC+SHE) but small lesions.\par MRI brain 1/2020 - reviewed and compared with 4/2019 - in comparison, there is one new R PV lesion and likely one small R posterior deep wm lesion (next to a lesion that was previously present). no travon+ lesions.\par \par MRI C spine 5/2015 - read as t2h at C5-6.\par MRI C spine 9/2019 read as unchanged c5/6 lesion. i agree\par \par \par AP: RRMS based on hx and imaging. Essentially off copaxone since summer 2018. Actively trying to conceive. New sensory disturbance in 9/2019 without cord MRI changes and new dizziness in 1/2020 with MRI brain revealing 2 small new lesions that were unrelated to new sx. \par \par Unclear etiology of new dizziness. Exam normal so will hold off on steroids. Trial meclizine.\par \par Pt opts to hold off any DMT during pregnancy but discussed that she can restart copaxone until she gets pregnant.\par \par - RTC 3m  Never smoker

## 2020-06-13 NOTE — ED PROVIDER NOTE - OBJECTIVE STATEMENT
54 year old female with urothelial CA, HTN, HLD, asthma, anemia, parathyroid nodule, PPM, presents to ED with epigastric pain since yesterday. patient reports a grumbling sensation in her stomach after eating salad yesterday with Italian dressing. States since that time she has been having a lot of gas. She was prescribed a PPI but could not find it. Has prior hx of endoscopy which were unremarkable. Denies fevers, nausea, emesis, chest pain, shortness of breath, dysuria, hematuria, diarrhea, constipation, or any other acute complaints.

## 2020-06-13 NOTE — ED PROVIDER NOTE - NSFOLLOWUPINSTRUCTIONS_ED_ALL_ED_FT
Abdominal Pain, Adult     Many things can cause belly (abdominal) pain. Most times, belly pain is not dangerous. Many cases of belly pain can be watched and treated at home. Sometimes belly pain is serious, though. Your doctor will try to find the cause of your belly pain.    Follow these instructions at home:  Take over-the-counter and prescription medicines only as told by your doctor. Do not take medicines that help you poop (laxatives) unless told to by your doctor. Drink enough fluid to keep your pee (urine) clear or pale yellow. Watch your belly pain for any changes. Keep all follow-up visits as told by your doctor. This is important.    Contact a doctor if:  Your belly pain changes or gets worse. You are not hungry, or you lose weight without trying. You are having trouble pooping (constipated) or have watery poop (diarrhea) for more than 2–3 days. You have pain when you pee or poop. Your belly pain wakes you up at night. Your pain gets worse with meals, after eating, or with certain foods. You are throwing up and cannot keep anything down. You have a fever.     Get help right away if:  Your pain does not go away as soon as your doctor says it should. You cannot stop throwing up. Your pain is only in areas of your belly, such as the right side or the left lower part of the belly. You have bloody or black poop, or poop that looks like tar. You have very bad pain, cramping, or bloating in your belly.   You have signs of not having enough fluid or water in your body (dehydration), such as:  Dark pee, very little pee, or no pee. Cracked lips. Dry mouth. Sunken eyes. Sleepiness. Weakness.     This information is not intended to replace advice given to you by your health care provider. Make sure you discuss any questions you have with your health care provider.    Document Released: 06/05/2009 Document Revised: 07/07/2017 Document Reviewed: 05/31/2017  [employer] Interactive Patient Education © 2020 [employer]

## 2020-06-13 NOTE — ED PROVIDER NOTE - NS ED ROS FT
ROS  GENERAL: no fevers, no chills  EYES: no visual changes, no blurry vision    ENT: no epistaxis,  no throat pain  CHEST: no pain with breathing,  no hemoptysis   CARDIAC: no chest pain, no upper back pain   GI: + abdominal pain, no hematemesis, no bright red blood per rectum   : no dysuria, no hematuria   MSK: no arm pain, no leg pain, no back pain   SKIN: no rash   NEURO: no headache, no neck pain   HEME: no easy bruising, no easy bleeding

## 2020-06-13 NOTE — ED PROVIDER NOTE - PATIENT PORTAL LINK FT
You can access the FollowMyHealth Patient Portal offered by [facility] by registering at the following website: [email or web address]. By joining [email or web address]’s FollowMyHealth portal, you will also be able to view your health information using other applications (apps) compatible with our system.

## 2020-06-13 NOTE — ED PROVIDER NOTE - PHYSICAL EXAMINATION
GEN:   comfortable, in no apparent distress, AOx3, frail   EYES:   PERRL, extra-occular movements intact  HEENT:   airway patent, moist mucosal membranes, uvula midline  CV:   regular rate and rhythm, normal S1/S2, no murmur  RESP:   clear to auscultation bilaterally, non-labored, speaking in full sentences  ABD:   soft, non tender, no guarding  :   no cva tenderness  MSK:   no musculoskeletal tenderness, 5/5 strength, moving all extremity  SKIN:   dry, intact, no rash  NEURO:   AOx3, no focal weakness or loss of sensation, gait normal, GCS 15  PSYCH: anxious

## 2020-06-13 NOTE — ED PROVIDER NOTE - PROGRESS NOTE DETAILS
Patient with mild pain. Toradol ordered. Plan of care signed out to Dr. Lloyd (Gabino) - my initial eval pt is without abd ttp; minimal pain but feeling bloated and asking for toradol. After more meds, pt reports feeling better and wants to go home. Will dc. Discussed indications for patient return to ED. Patient understood.

## 2020-06-13 NOTE — ED PROVIDER NOTE - CLINICAL SUMMARY MEDICAL DECISION MAKING FREE TEXT BOX
53 yo F with epigastric pain after eating salad. Minimal tenderness on exam. Labs grossly unremarkable. Will trial analgesia, antiemetics and reassess. Plan of care signed out to Dr. ARTEMIO Lloyd.

## 2020-06-13 NOTE — ED ADULT NURSE NOTE - TEMPLATE LIST FOR HEAD TO TOE ASSESSMENT
Abdominal Pain, N/V/D
“You can access the FollowHealth Patient Portal, offered by [facility], by registering with the following website: [email or web address]”

## 2020-06-16 ENCOUNTER — EMERGENCY (EMERGENCY)
Facility: HOSPITAL | Age: 55
LOS: 1 days | Discharge: HOME | End: 2020-06-16
Attending: EMERGENCY MEDICINE
Payer: MEDICARE

## 2020-06-16 VITALS
DIASTOLIC BLOOD PRESSURE: 77 MMHG | TEMPERATURE: 98 F | OXYGEN SATURATION: 100 % | SYSTOLIC BLOOD PRESSURE: 153 MMHG | HEIGHT: 65 IN | HEART RATE: 70 BPM | WEIGHT: 89.95 LBS | RESPIRATION RATE: 18 BRPM

## 2020-06-16 DIAGNOSIS — Z98.89 OTHER SPECIFIED POSTPROCEDURAL STATES: Chronic | ICD-10-CM

## 2020-06-16 LAB
ALBUMIN SERPL ELPH-MCNC: 3.4 G/DL — LOW (ref 3.5–5)
ALP SERPL-CCNC: 101 U/L — SIGNIFICANT CHANGE UP (ref 40–120)
ALT FLD-CCNC: 13 U/L DA — SIGNIFICANT CHANGE UP (ref 10–60)
ANION GAP SERPL CALC-SCNC: 8 MMOL/L — SIGNIFICANT CHANGE UP (ref 5–17)
APPEARANCE UR: ABNORMAL
AST SERPL-CCNC: 12 U/L — SIGNIFICANT CHANGE UP (ref 10–40)
BACTERIA # UR AUTO: ABNORMAL /HPF
BASOPHILS # BLD AUTO: 0.03 K/UL — SIGNIFICANT CHANGE UP (ref 0–0.2)
BASOPHILS NFR BLD AUTO: 0.6 % — SIGNIFICANT CHANGE UP (ref 0–2)
BILIRUB SERPL-MCNC: 0.3 MG/DL — SIGNIFICANT CHANGE UP (ref 0.2–1.2)
BILIRUB UR-MCNC: NEGATIVE — SIGNIFICANT CHANGE UP
BUN SERPL-MCNC: 25 MG/DL — HIGH (ref 7–18)
CALCIUM SERPL-MCNC: 10.5 MG/DL — SIGNIFICANT CHANGE UP (ref 8.4–10.5)
CHLORIDE SERPL-SCNC: 103 MMOL/L — SIGNIFICANT CHANGE UP (ref 96–108)
CO2 SERPL-SCNC: 26 MMOL/L — SIGNIFICANT CHANGE UP (ref 22–31)
COLOR SPEC: ABNORMAL
COMMENT - URINE: SIGNIFICANT CHANGE UP
CREAT SERPL-MCNC: 1.11 MG/DL — SIGNIFICANT CHANGE UP (ref 0.5–1.3)
DIFF PNL FLD: ABNORMAL
EOSINOPHIL # BLD AUTO: 0.08 K/UL — SIGNIFICANT CHANGE UP (ref 0–0.5)
EOSINOPHIL NFR BLD AUTO: 1.5 % — SIGNIFICANT CHANGE UP (ref 0–6)
EPI CELLS # UR: ABNORMAL /HPF
GLUCOSE SERPL-MCNC: 99 MG/DL — SIGNIFICANT CHANGE UP (ref 70–99)
GLUCOSE UR QL: NEGATIVE — SIGNIFICANT CHANGE UP
HCT VFR BLD CALC: 35.3 % — SIGNIFICANT CHANGE UP (ref 34.5–45)
HGB BLD-MCNC: 10.7 G/DL — LOW (ref 11.5–15.5)
IMM GRANULOCYTES NFR BLD AUTO: 0.4 % — SIGNIFICANT CHANGE UP (ref 0–1.5)
KETONES UR-MCNC: NEGATIVE — SIGNIFICANT CHANGE UP
LEUKOCYTE ESTERASE UR-ACNC: ABNORMAL
LIDOCAIN IGE QN: 164 U/L — SIGNIFICANT CHANGE UP (ref 73–393)
LYMPHOCYTES # BLD AUTO: 1.27 K/UL — SIGNIFICANT CHANGE UP (ref 1–3.3)
LYMPHOCYTES # BLD AUTO: 23.7 % — SIGNIFICANT CHANGE UP (ref 13–44)
MCHC RBC-ENTMCNC: 25.8 PG — LOW (ref 27–34)
MCHC RBC-ENTMCNC: 30.3 GM/DL — LOW (ref 32–36)
MCV RBC AUTO: 85.3 FL — SIGNIFICANT CHANGE UP (ref 80–100)
MONOCYTES # BLD AUTO: 0.28 K/UL — SIGNIFICANT CHANGE UP (ref 0–0.9)
MONOCYTES NFR BLD AUTO: 5.2 % — SIGNIFICANT CHANGE UP (ref 2–14)
NEUTROPHILS # BLD AUTO: 3.68 K/UL — SIGNIFICANT CHANGE UP (ref 1.8–7.4)
NEUTROPHILS NFR BLD AUTO: 68.6 % — SIGNIFICANT CHANGE UP (ref 43–77)
NITRITE UR-MCNC: POSITIVE
NRBC # BLD: 0 /100 WBCS — SIGNIFICANT CHANGE UP (ref 0–0)
PH UR: 5 — SIGNIFICANT CHANGE UP (ref 5–8)
PLATELET # BLD AUTO: 441 K/UL — HIGH (ref 150–400)
POTASSIUM SERPL-MCNC: 4 MMOL/L — SIGNIFICANT CHANGE UP (ref 3.5–5.3)
POTASSIUM SERPL-SCNC: 4 MMOL/L — SIGNIFICANT CHANGE UP (ref 3.5–5.3)
PROT SERPL-MCNC: 9.7 G/DL — HIGH (ref 6–8.3)
PROT UR-MCNC: 100
RBC # BLD: 4.14 M/UL — SIGNIFICANT CHANGE UP (ref 3.8–5.2)
RBC # FLD: 14.8 % — HIGH (ref 10.3–14.5)
RBC CASTS # UR COMP ASSIST: >50 /HPF (ref 0–2)
SODIUM SERPL-SCNC: 137 MMOL/L — SIGNIFICANT CHANGE UP (ref 135–145)
SP GR SPEC: 1.02 — SIGNIFICANT CHANGE UP (ref 1.01–1.02)
UROBILINOGEN FLD QL: NEGATIVE — SIGNIFICANT CHANGE UP
WBC # BLD: 5.36 K/UL — SIGNIFICANT CHANGE UP (ref 3.8–10.5)
WBC # FLD AUTO: 5.36 K/UL — SIGNIFICANT CHANGE UP (ref 3.8–10.5)
WBC UR QL: ABNORMAL /HPF (ref 0–5)

## 2020-06-16 PROCEDURE — 93005 ELECTROCARDIOGRAM TRACING: CPT

## 2020-06-16 PROCEDURE — 85027 COMPLETE CBC AUTOMATED: CPT

## 2020-06-16 PROCEDURE — 87186 SC STD MICRODIL/AGAR DIL: CPT

## 2020-06-16 PROCEDURE — 81025 URINE PREGNANCY TEST: CPT

## 2020-06-16 PROCEDURE — 80053 COMPREHEN METABOLIC PANEL: CPT

## 2020-06-16 PROCEDURE — 36415 COLL VENOUS BLD VENIPUNCTURE: CPT

## 2020-06-16 PROCEDURE — 81001 URINALYSIS AUTO W/SCOPE: CPT

## 2020-06-16 PROCEDURE — 83690 ASSAY OF LIPASE: CPT

## 2020-06-16 PROCEDURE — 99284 EMERGENCY DEPT VISIT MOD MDM: CPT

## 2020-06-16 PROCEDURE — 99283 EMERGENCY DEPT VISIT LOW MDM: CPT

## 2020-06-16 PROCEDURE — 87086 URINE CULTURE/COLONY COUNT: CPT

## 2020-06-16 RX ORDER — AZTREONAM 2 G
1 VIAL (EA) INJECTION
Qty: 14 | Refills: 0
Start: 2020-06-16 | End: 2020-06-22

## 2020-06-16 RX ADMIN — Medication 1 TABLET(S): at 13:29

## 2020-06-16 RX ADMIN — Medication 30 MILLILITER(S): at 12:28

## 2020-06-16 NOTE — ED ADULT NURSE NOTE - NSIMPLEMENTINTERV_GEN_ALL_ED
Implemented All Universal Safety Interventions:  [place] to call system. Call bell, personal items and telephone within reach. Instruct patient to call for assistance. Room bathroom lighting operational. Non-slip footwear when patient is off stretcher. Physically safe environment: no spills, clutter or unnecessary equipment. Stretcher in lowest position, wheels locked, appropriate side rails in place.

## 2020-06-16 NOTE — ED PROVIDER NOTE - NSFOLLOWUPINSTRUCTIONS_ED_ALL_ED_FT
Follow up with the GI doctor tomorrow as per you appointment.  Follow up with your cardiologist or primary care doctor this week.  Follow up with your urologist this week.  If you experience any new or worsening symptoms or if you are concerned you can always come back to the emergency for a re-evaluation.  If there were any prescriptions given to you during the visit today take them as prescribed. If you have any questions you can ask the pharmacist.

## 2020-06-16 NOTE — ED PROVIDER NOTE - PATIENT PORTAL LINK FT
You can access the FollowMyHealth Patient Portal offered by [facility] by registering at the following website: [email or web address]. By joining [employer]’s FollowMyHealth portal, you will also be able to view your health information using other applications (apps) compatible with our system.

## 2020-06-16 NOTE — ED PROVIDER NOTE - ATTENDING CONTRIBUTION TO CARE
I was physically present for the E/M service provided. I agree with above history, physical, and plan which I have reviewed and edited where appropriate. I was physically present for the key portions of the service provided.    Wilson: 54 year old female with history of bladder masses, HTN, HLD, liver cyst, parathyroid tumor, gastritis, and anemia here with complaint of hematuria since this morning. epigastric pain radiating to back and hematuria.     slender, aox4, non-toxic  lungs cta, s1s2  abd s/nt/nd    neurologically intact    a/p: pt with known malignancy likely causing pain will check ua and basic labs.  pt has PET scan in 2 days.  tolerating po

## 2020-06-16 NOTE — ED PROVIDER NOTE - CHPI ED SYMPTOMS NEG
no chest pain, no shortness of breath, no dizziness, no black stool, no BRBPR, no nausea, no vomiting, no diarrhea, no vaginal bleeding

## 2020-06-16 NOTE — ED PROVIDER NOTE - PROGRESS NOTE DETAILS
H/H stable from last visit. Other labs similar results. UA shows infection. ECG sinus parth at 51 bpm. No cardiac symptoms. Patient already has GI f/u visit tomorrow. Will advise to follow up with cardiologist this week regarding the bradycardia. Patient already has a urologist. Pt is well appearing walking with steady gait, stable for discharge and follow up without fail with medical doctor. I had a detailed discussion with the patient and/or guardian regarding the historical points, exam findings, and any diagnostic results supporting the discharge diagnosis. Pt educated on care and need for follow up. Strict return instructions and red flag signs and symptoms discussed with patient. Questions answered. Pt shows understanding of discharge information and agrees to follow.

## 2020-06-16 NOTE — ED ADULT NURSE NOTE - OBJECTIVE STATEMENT
pt is here for abdominal pain.  pt stated that abdominal pain with bloody urine, denied fever or chills, denied NV/D, no distress noted at this time.

## 2020-06-16 NOTE — ED PROVIDER NOTE - CLINICAL SUMMARY MEDICAL DECISION MAKING FREE TEXT BOX
54 year old male with CC of hematuria today. Patient is well appearing, vitals within normal limits, afebrile. Will order urine analysis to r/o infection and confirm presence of hematuria, order labs to trend H&H, and other labs for liver function and pancreas inflammation. Will give Maalox for epigastric pain and reassess.

## 2020-06-18 RX ORDER — CIPROFLOXACIN LACTATE 400MG/40ML
1 VIAL (ML) INTRAVENOUS
Qty: 14 | Refills: 0
Start: 2020-06-18 | End: 2020-06-24

## 2020-06-18 RX ORDER — ONDANSETRON 8 MG/1
1 TABLET, FILM COATED ORAL
Qty: 10 | Refills: 0
Start: 2020-06-18

## 2020-06-18 NOTE — ED POST DISCHARGE NOTE - OTHER COMMUNICATION
Patient called on 6/18, said bactrim is causing nausea and dizziness. Asked for another abx. Based on previous UCx, cipro chosen. Patient asked for low dose because she only weight 40Kg. WIll give 250mg BID. Also give zofran to take beforehand. Return to the ED immediately if getting worse, not improving, or if having any new or troubling symptoms.

## 2020-08-01 NOTE — ED ADULT NURSE NOTE - NS ED PATIENT SAFETY CONCERN
No PRACHI Sommers I participated in the care of this patient. I agree with the history, physical and plan. Patient pw intermittent lower abdominal cramping since yesterday associated with one large episode of watery diarrhea this am. Denies known fever, chills, nausea, vomiting, recent ABX use, recent travel. Abd currently soft, NT/ND. Took no meds prior to arrival. Will proceed with CT Ab/pelvis, administer fluids, draw basic labs and reassess.

## 2020-09-08 NOTE — ED ADULT TRIAGE NOTE - AS TEMP SITE
Gen: Alert, oriented, in no distress Heent: no icterus, lips wnl Lungs: bilateral breath sounds CVS: first and second heart sounds Abdomen: full, soft, non tender Ext: no edema Joints: normal joints and symmetry Spine: consistent with age Skin: no rash on exposed areas Neuro: no focal localizing signs oral

## 2020-09-16 NOTE — H&P ADULT - NSTOBACCOSCREENHP_GEN_A_CS
Medications reviewed and updated.  Denies known Latex allergy or symptoms of Latex sensitivity.  Tobacco verified.    Health Maintenance Due   Topic Date Due   • Depression Screening  06/26/2020   • Influenza Vaccine (1) 09/01/2020       Patient is due for topics as listed above but is not proceeding with Immunization(s) Influenza at this time. States she got one through her emp. [place].          No

## 2020-10-01 NOTE — ED ADULT NURSE NOTE - NSFALLRSKUNASSIST_ED_ALL_ED
Laparoscopic Discharge Instructions    Ways to Take Care of Yourself    Anesthesia  General precautions you should follow during the 24 hours after receiving any anesthesia (general epidural, spinal, local with sedation, or nerve block):  1. If you experience nausea, do not eat or drink anything until nausea is gone.  After nausea is gone, take small amounts of water at first and then progress to other fluids and solid foods as tolerated.  If nausea and vomiting persists, notify your physician.  A sore throat can result from the breathing tube used to administer general anesthesia.  This usually lasts 1-2 days.  Cold liquids, ice chips, and throat lozenges help relieve the soreness.  2. An anti-nausea medication will be prescribed to take if nausea persists. Take as directed.    Activity  1. Take short walks around the house three times a day.  Rest when you get tired.  2. NO HEAVY LIFTING!  Do not lift over 10-15 pounds for 4-6 weeks.  3. Do ankle pumps and ankle circles throughout the day.  4. It is common to have bruising and swelling near the incisions.  5. Return to clinic as scheduled            Medications  1. Your pain medication contains Acetaminophen/Tylenol. Do not exceed 4,000 mg of Tylenol (acetaminophen) from all sources in a 24 hour period.  2. Using ibuprofen for pain management is preferred over narcotics (hydrocodone or oxycodone). You may take this between doses of narcotics.  3. Some pain medications may be constipating. Take stool softeners if taking narcotic pain medication to prevent constipation.    Diet   1. Soft diet for three weeks. If having significant chest pain or difficulties swallowing, may switch to pureed diet or full liquid diet. After three weeks, you can eat regular food.     2.   The esophagus must be used to overcome difficulties with swallowing. Having three snacks per day of soft foods (yogurt, cottage cheese, oatmeal, etc.) helps work the esophagus to minimize risk of the  difficulties with swallowing not improving.     3. Drink 8-10 glasses of water/juices a day.   4. Eat fruits and vegetables to avoid constipation.  If constipation occurs, may take an over the counter laxative.      Dressing/Incision  1. Keep dressing clean and dry.  May shower in 24 hours. Dermabond will peel up after 1-2 weeks. It can be removed at that time.  2. Keep an ice bag on the incisions with any swelling. Bruises are not unusual several days after surgery. Apply ice and gentle pressure if concerned.    Call Your Doctor if You Have Any of the Following (Red Flags):  1. Increasing pain, redness, swelling, or drainage from incision.  2. Fever over 101°F for longer than 24 hours.  Check your temperature twice a day.  3. Trouble breathing or chest pain.  4. Pain, redness, or swelling in your legs.  5. Nausea, vomiting, or diarrhea that lasts longer than 24 hours  6. Difficulty with swallowing or pain with swallowing that does not improve.    Physician: Dr. Vance Grimes   Office Phone: [phone]                      Office Address: [address] [place] [address], [address]        [place] [id number]    You have identified that you have a Power of  for Healthcare Document at home.  Please provide a copy of your Power of  for Healthcare Document to your physician at your scheduled follow up visit so a copy can be placed on file.     A copy of your Power of  Document can also be mailed to the following address to be placed on file:  [facility]  Attn: Medical Records  [facility] [place] [address]             no

## 2020-10-19 NOTE — ED PROVIDER NOTE - OBJECTIVE STATEMENT
54 year old female with history of bladder masses, HTN, HLD, liver cyst, parathyroid tumor, gastritis, and anemia here with complaint of hematuria since this morning. Patient reports that for the last few weeks she has been having epigastric pain radiating across the upper abdomen and bilateral rib area. Patient was started on Omeprazole with mild relief. This morning patient's first urination noticed blood in her urine. Patient denies blood thinner use, chest pain, shortness of breath, dizziness, black stool, BRBPR, nausea, vomiting, diarrhea, vaginal bleeding, or any other acute complaints. Patient reports that she has an appointment with her gastroenterologist tomorrow and last endoscopy and colonoscopy two years ago. Patient also reporting having a PET scan to r/o metastatic lesions. Patient also has gross follow up with PMD, cardiologist, and hematologist. Rebekah Werner)

## 2020-10-27 NOTE — ED ADULT NURSE NOTE - BOWEL SOUNDS RUQ
Patient called and is wanting to know if she would be ok with him starting back on ADHD medication. He states that ADHD is starting to effect his preform ance at work.  Please advise
Pt was notified of this an will call with appt.
present

## 2021-05-05 ENCOUNTER — APPOINTMENT (OUTPATIENT)
Dept: UROLOGY | Facility: CLINIC | Age: 56
End: 2021-05-05
Payer: MEDICARE

## 2021-05-05 DIAGNOSIS — N32.89 OTHER SPECIFIED DISORDERS OF BLADDER: ICD-10-CM

## 2021-05-05 DIAGNOSIS — Z87.448 PERSONAL HISTORY OF OTHER DISEASES OF URINARY SYSTEM: ICD-10-CM

## 2021-05-05 PROCEDURE — 99214 OFFICE O/P EST MOD 30 MIN: CPT

## 2021-05-05 NOTE — ASSESSMENT
[FreeTextEntry1] : patient with bladder masses\par hx of renal mass and + urine cytology - back in 2019\par was to have left N-U --did not f/u\par now here with bilat hydro and new RIGHT nt \par chemo for  bladder masses -- with recent PET scan showing diffuse masses throughtout bladder\par no longer wants togo back to [facility] \par will refer to Dr rao of [facility]\par and refer to colleague for bladder mass tissue to determine course of therapy

## 2021-05-05 NOTE — HISTORY OF PRESENT ILLNESS
[FreeTextEntry1] : patient seen by Dr Davidson in 2019 for left hydro and renal mass\par was to arrange an L- nephroureterctomy after left URS and + cytology on that side\par however did nt f/u \par since then had Right NT placed for hydro\par recent PET scan was faxed over showing chronic left hydro with thinning left cortex and bilat active tissue in both ueters along with increased bladder masses\par patient states that she went to [facility] for chemo for bladder lesions (and had 4 cycles from dec 2020 to april 2021) and was told masses were responding ( she reports) and had LN biopsy which she states showed NO cancer\par she is not happy to care there and knows a colleague I worked with when I was at [facility] who referred her to [facility]\par she has had some weight gain ( states that initial CT was done for weight loss) 2 years ago \par and just recently has noticed some blood with voiding \par no flank pain and Right NT draining well\par no fever or N/V

## 2021-05-05 NOTE — PHYSICAL EXAM
[General Appearance - Well Developed] : well developed [General Appearance - Well Nourished] : well nourished [Normal Appearance] : normal appearance [Well Groomed] : well groomed [General Appearance - In No Acute Distress] : no acute distress [Abdomen Soft] : soft [Abdomen Tenderness] : non-tender [Abdomen Mass (___ Cm)] : no abdominal mass palpated [Abdomen Hernia] : no hernia was discovered [Costovertebral Angle Tenderness] : no ~M costovertebral angle tenderness [FreeTextEntry1] : declined vag exam  [Edema] : no peripheral edema [] : no respiratory distress [Respiration, Rhythm And Depth] : normal respiratory rhythm and effort [Exaggerated Use Of Accessory Muscles For Inspiration] : no accessory muscle use [Oriented To Time, Place, And Person] : oriented to person, place, and time [Affect] : the affect was normal [Mood] : the mood was normal [Not Anxious] : not anxious [Normal Station and Gait] : the gait and station were normal for the patient's age [No Focal Deficits] : no focal deficits [Cervical Lymph Nodes Enlarged Posterior Bilaterally] : posterior cervical [Cervical Lymph Nodes Enlarged Anterior Bilaterally] : anterior cervical [Supraclavicular Lymph Nodes Enlarged Bilaterally] : supraclavicular

## 2021-05-11 ENCOUNTER — APPOINTMENT (OUTPATIENT)
Dept: HEMATOLOGY ONCOLOGY | Facility: CLINIC | Age: 56
End: 2021-05-11

## 2021-05-12 ENCOUNTER — NON-APPOINTMENT (OUTPATIENT)
Age: 56
End: 2021-05-12

## 2021-05-13 ENCOUNTER — NON-APPOINTMENT (OUTPATIENT)
Age: 56
End: 2021-05-13

## 2021-05-14 ENCOUNTER — NON-APPOINTMENT (OUTPATIENT)
Age: 56
End: 2021-05-14

## 2021-05-18 NOTE — PATIENT PROFILE ADULT - ARE SIGNIFICANT INDICATORS COMPLETE.
[Follow-Up - Clinic] : a clinic follow-up of [Coronary Artery Disease] : coronary artery disease [Hypertension] : hypertension [Mitral Regurgitation] : mitral regurgitation [Palpitations] : palpitations [Hyperlipidemia] : hyperlipidemia [FreeTextEntry1] : s/p stent CAD, s/p b/l venous ablation Yes

## 2021-05-20 ENCOUNTER — APPOINTMENT (OUTPATIENT)
Dept: UROLOGY | Facility: CLINIC | Age: 56
End: 2021-05-20

## 2021-05-21 ENCOUNTER — APPOINTMENT (OUTPATIENT)
Dept: UROLOGY | Facility: CLINIC | Age: 56
End: 2021-05-21
Payer: MEDICARE

## 2021-05-21 VITALS — RESPIRATION RATE: 16 BRPM | SYSTOLIC BLOOD PRESSURE: 135 MMHG | DIASTOLIC BLOOD PRESSURE: 80 MMHG | HEART RATE: 58 BPM

## 2021-05-21 PROCEDURE — 99213 OFFICE O/P EST LOW 20 MIN: CPT

## 2021-05-24 NOTE — HISTORY OF PRESENT ILLNESS
[FreeTextEntry1] : 55yo female with cc of TCC. Pt was seen by Dr Davidson back in 8/2019. At that time she was noted to have mulriple comorbidities. She had hematuria and underwent eval with CT revealing a probable UP transitional cell ca based on L URS and abnormal cytology that localized to L but bx was non-contributory. Plan was made for probable L nephroU. Seems pt was lost to follow-up. She reports she did not have uppertract disease and only had blockage managed with stent. Unclear but seems she developed metastatic disease and underwent chemo amd reports she only did 3 cycles of [place]/Cis instead of 12. Only records that pt brought for review was the non diagnostic path from 2019 and a recent PET CT. Also has a bladder bx report from 5/2020 that showed HG Ta TCC with focal squamous features. Her most recent PET shows a large bladder mass  and unclear uppertract picture. She was referred to Dr Alcocer but cancelled the appointment. She reports PCN was placed in R kidney during an infection. Has been to dependent drainage. Left side has not been instrumented that I can tell, unclear drainage/function.

## 2021-05-24 NOTE — PHYSICAL EXAM
[General Appearance - In No Acute Distress] : no acute distress [Abdomen Soft] : soft [FreeTextEntry1] : thin [Oriented To Time, Place, And Person] : oriented to person, place, and time [Normal Station and Gait] : the gait and station were normal for the patient's age

## 2021-05-24 NOTE — ASSESSMENT
[FreeTextEntry1] : Appears to have probable metastatic TCC s/p partial chemo. Unable to review any of the images. Unclear when and why PCN was placed and state of bladder and uppertract. \par --Need to obtain outside records\par --Need to review PET imaging\par --Needs to see Dr Alcocer. She reports that she does not want to see him because she wants to rebiopsy. Discussed would likely not biopsy bladder for prior path proven malignancy. \par

## 2021-05-25 LAB — URINE CYTOLOGY: NORMAL

## 2021-06-04 ENCOUNTER — APPOINTMENT (OUTPATIENT)
Dept: UROLOGY | Facility: CLINIC | Age: 56
End: 2021-06-04

## 2021-06-07 ENCOUNTER — TRANSCRIPTION ENCOUNTER (OUTPATIENT)
Age: 56
End: 2021-06-07

## 2021-06-21 ENCOUNTER — APPOINTMENT (OUTPATIENT)
Dept: UROLOGY | Facility: CLINIC | Age: 56
End: 2021-06-21
Payer: MEDICARE

## 2021-06-21 PROCEDURE — 99215 OFFICE O/P EST HI 40 MIN: CPT

## 2021-06-21 NOTE — PHYSICAL EXAM
[General Appearance - In No Acute Distress] : no acute distress [Abdomen Soft] : soft [Oriented To Time, Place, And Person] : oriented to person, place, and time [Normal Station and Gait] : the gait and station were normal for the patient's age [FreeTextEntry1] : thin, R PCN in placed, c/d/i, redressed [Exaggerated Use Of Accessory Muscles For Inspiration] : no accessory muscle use

## 2021-06-21 NOTE — ASSESSMENT
[FreeTextEntry1] : Initially with organ confined bladder cancer but concern for high risk (T3) with L hydro and atrophy. PET avid nodes but bx was negative. CRYSTAL resolved. Now ~6mo since any treatment and known disease. Pt is adamant that she wants a bladder biopsy and is not convinced she has bladder cancer. Spent time discussing course thus far and need to restage to determine treatment algorithm. \par --CBC, CMP\par --PET. \par --RTC after PET. If nodes are positive, will recommend IR bx. If negative, will plan on restaging TURBT with possible R ureteral stent. Pt agreeable to this.

## 2021-06-22 LAB
ALBUMIN SERPL ELPH-MCNC: 5 G/DL
ALP BLD-CCNC: 106 U/L
ALT SERPL-CCNC: 10 U/L
ANION GAP SERPL CALC-SCNC: 14 MMOL/L
AST SERPL-CCNC: 20 U/L
BASOPHILS # BLD AUTO: 0.02 K/UL
BASOPHILS NFR BLD AUTO: 0.5 %
BILIRUB SERPL-MCNC: 0.3 MG/DL
BUN SERPL-MCNC: 17 MG/DL
CALCIUM SERPL-MCNC: 10.8 MG/DL
CHLORIDE SERPL-SCNC: 101 MMOL/L
CO2 SERPL-SCNC: 24 MMOL/L
CREAT SERPL-MCNC: 1.16 MG/DL
EOSINOPHIL # BLD AUTO: 0.1 K/UL
EOSINOPHIL NFR BLD AUTO: 2.5 %
GLUCOSE SERPL-MCNC: 82 MG/DL
HCT VFR BLD CALC: 45.9 %
HGB BLD-MCNC: 14.7 G/DL
IMM GRANULOCYTES NFR BLD AUTO: 0.2 %
INR PPP: 0.98 RATIO
LYMPHOCYTES # BLD AUTO: 1.58 K/UL
LYMPHOCYTES NFR BLD AUTO: 39 %
MAN DIFF?: NORMAL
MCHC RBC-ENTMCNC: 29.9 PG
MCHC RBC-ENTMCNC: 32 GM/DL
MCV RBC AUTO: 93.5 FL
MONOCYTES # BLD AUTO: 0.21 K/UL
MONOCYTES NFR BLD AUTO: 5.2 %
NEUTROPHILS # BLD AUTO: 2.13 K/UL
NEUTROPHILS NFR BLD AUTO: 52.6 %
PLATELET # BLD AUTO: 206 K/UL
POTASSIUM SERPL-SCNC: 4.5 MMOL/L
PROT SERPL-MCNC: 7.5 G/DL
PT BLD: 11.7 SEC
RBC # BLD: 4.91 M/UL
RBC # FLD: 12 %
SODIUM SERPL-SCNC: 140 MMOL/L
WBC # FLD AUTO: 4.05 K/UL

## 2021-07-08 ENCOUNTER — APPOINTMENT (OUTPATIENT)
Dept: UROLOGY | Facility: CLINIC | Age: 56
End: 2021-07-08
Payer: MEDICARE

## 2021-07-08 VITALS
HEIGHT: 65 IN | DIASTOLIC BLOOD PRESSURE: 74 MMHG | WEIGHT: 113 LBS | SYSTOLIC BLOOD PRESSURE: 130 MMHG | HEART RATE: 62 BPM | OXYGEN SATURATION: 98 % | TEMPERATURE: 98.3 F | RESPIRATION RATE: 14 BRPM | BODY MASS INDEX: 18.83 KG/M2

## 2021-07-08 PROCEDURE — 99213 OFFICE O/P EST LOW 20 MIN: CPT

## 2021-07-08 NOTE — HISTORY OF PRESENT ILLNESS
[FreeTextEntry1] : 53yo female with cc of malfunctioning PCN and TCC. Pt was seen by Dr Davidson back in 8/2019. At that time she was noted to have mulriple comorbidities. She had hematuria and underwent eval with CT revealing a probable UP transitional cell ca based on L URS and abnormal cytology that localized to L but bx was non-contributory. Plan was made for probable L nephroU. Seems pt was lost to follow-up. She reports she did not have uppertract disease and only had blockage managed with stent. Unclear but seems she developed metastatic disease and underwent chemo and reports she only did 3 cycles of [place]/Cis instead of 12. Only records that pt brought for review was the non diagnostic path from 2019 and a recent PET CT. Also has a bladder bx report from 5/2020 that showed HG Ta TCC with focal squamous features. Her most recent PET shows a large bladder mass  and unclear uppertract picture. She was referred to Dr Alcocer but cancelled the appointment. She reports PCN was placed in R kidney during an infection. Has been to dependent drainage. Left side has not been instrumented that I can tell, unclear drainage/function. Plan made to obtain outside records and follow-up. Back in May she had chest pain and went to [facility]. She was dx with pericardial effusion. Has had follow-up echo per report. WHile in ER, dislodged her PCN and had it replaced (5/28). \par \par Review of records showed HG Ta TCC as above and bladder with HG T1. Scans showed nodularity on L concerning for extension outside bladder. She had PET with PET avid LAD but Had subsequent bx that was negative. Plan made for neoadjuvant chemo (GC) followed by L nephro-U and cystectomy. She had 2 cycles and was admitted to hospital in Jan for CRYSTAL and R flank pain. Had new R hydro. CRYSTAL improved and now at recent ER visit was back to 1.0. She reports PET in April at [facility] but no records. Plan made for repeat PET given time interval and pt is scheduled to have this next week. \par \par Pt comes in today and reports no urine output from nephrostomy since last night. No flank pain. No fevers. COntinues to make urine from bladder. Examined PCN and on initial attempt at withdrawal, no urine was obtained. PCN then flushed and initially with high resistance but able to flush and withdraw. No brisk urine flow following this however. Repeated flushing several times.

## 2021-07-08 NOTE — ASSESSMENT
[FreeTextEntry1] : Malfunctioning PCN. FLushed today and able to return but no brisk flow of urine following this. \par --Cipro ppx x2 doses\par --Monitor UOP and if no drainage over next hour, needs to go to ER to have IR replace tube. \par \par Initially with organ confined bladder cancer but concern for high risk (T3) with L hydro and atrophy. PET avid nodes but bx was negative. CRYSTAL resolved. Now ~6mo since any treatment and known disease. Pt is adamant that she wants a bladder biopsy and is not convinced she has bladder cancer. Spent time discussing course thus far and need to restage to determine treatment algorithm. \par --PET. \par --RTC after PET. If nodes are positive, will recommend IR bx. If negative, will plan on restaging TURBT with possible R ureteral stent. Pt agreeable to this.

## 2021-07-08 NOTE — PHYSICAL EXAM
[General Appearance - In No Acute Distress] : no acute distress [Abdomen Soft] : soft [FreeTextEntry1] : R PCN in place, no urine in leg bag.  [Exaggerated Use Of Accessory Muscles For Inspiration] : no accessory muscle use [Oriented To Time, Place, And Person] : oriented to person, place, and time [Normal Station and Gait] : the gait and station were normal for the patient's age

## 2021-07-09 ENCOUNTER — APPOINTMENT (OUTPATIENT)
Dept: UROLOGY | Facility: CLINIC | Age: 56
End: 2021-07-09
Payer: MEDICARE

## 2021-07-09 ENCOUNTER — NON-APPOINTMENT (OUTPATIENT)
Age: 56
End: 2021-07-09

## 2021-07-09 PROCEDURE — 99211 OFF/OP EST MAY X REQ PHY/QHP: CPT

## 2021-07-19 ENCOUNTER — TRANSCRIPTION ENCOUNTER (OUTPATIENT)
Age: 56
End: 2021-07-19

## 2021-07-19 ENCOUNTER — INPATIENT (INPATIENT)
Facility: HOSPITAL | Age: 56
LOS: 7 days | Discharge: HOME | DRG: 689 | End: 2021-07-27
Attending: GENERAL PRACTICE | Admitting: GENERAL PRACTICE
Payer: MEDICARE

## 2021-07-19 VITALS
WEIGHT: 98.11 LBS | DIASTOLIC BLOOD PRESSURE: 91 MMHG | HEART RATE: 69 BPM | TEMPERATURE: 98 F | HEIGHT: 65 IN | SYSTOLIC BLOOD PRESSURE: 160 MMHG | RESPIRATION RATE: 16 BRPM | OXYGEN SATURATION: 99 %

## 2021-07-19 DIAGNOSIS — Z95.0 PRESENCE OF CARDIAC PACEMAKER: ICD-10-CM

## 2021-07-19 DIAGNOSIS — N13.30 UNSPECIFIED HYDRONEPHROSIS: ICD-10-CM

## 2021-07-19 DIAGNOSIS — I10 ESSENTIAL (PRIMARY) HYPERTENSION: ICD-10-CM

## 2021-07-19 DIAGNOSIS — Z98.89 OTHER SPECIFIED POSTPROCEDURAL STATES: Chronic | ICD-10-CM

## 2021-07-19 DIAGNOSIS — Z29.9 ENCOUNTER FOR PROPHYLACTIC MEASURES, UNSPECIFIED: ICD-10-CM

## 2021-07-19 DIAGNOSIS — N32.89 OTHER SPECIFIED DISORDERS OF BLADDER: ICD-10-CM

## 2021-07-19 DIAGNOSIS — N12 TUBULO-INTERSTITIAL NEPHRITIS, NOT SPECIFIED AS ACUTE OR CHRONIC: ICD-10-CM

## 2021-07-19 DIAGNOSIS — Z93.6 OTHER ARTIFICIAL OPENINGS OF URINARY TRACT STATUS: ICD-10-CM

## 2021-07-19 DIAGNOSIS — J45.909 UNSPECIFIED ASTHMA, UNCOMPLICATED: ICD-10-CM

## 2021-07-19 DIAGNOSIS — R07.9 CHEST PAIN, UNSPECIFIED: ICD-10-CM

## 2021-07-19 LAB
ALBUMIN SERPL ELPH-MCNC: 3.6 G/DL — SIGNIFICANT CHANGE UP (ref 3.5–5)
ALP SERPL-CCNC: 91 U/L — SIGNIFICANT CHANGE UP (ref 40–120)
ALT FLD-CCNC: 17 U/L DA — SIGNIFICANT CHANGE UP (ref 10–60)
ANION GAP SERPL CALC-SCNC: 5 MMOL/L — SIGNIFICANT CHANGE UP (ref 5–17)
APPEARANCE UR: ABNORMAL
AST SERPL-CCNC: 25 U/L — SIGNIFICANT CHANGE UP (ref 10–40)
BACTERIA # UR AUTO: ABNORMAL /HPF
BASOPHILS # BLD AUTO: 0.02 K/UL — SIGNIFICANT CHANGE UP (ref 0–0.2)
BASOPHILS NFR BLD AUTO: 0.4 % — SIGNIFICANT CHANGE UP (ref 0–2)
BILIRUB SERPL-MCNC: 0.4 MG/DL — SIGNIFICANT CHANGE UP (ref 0.2–1.2)
BILIRUB UR-MCNC: NEGATIVE — SIGNIFICANT CHANGE UP
BUN SERPL-MCNC: 25 MG/DL — HIGH (ref 7–18)
CALCIUM SERPL-MCNC: 9.4 MG/DL — SIGNIFICANT CHANGE UP (ref 8.4–10.5)
CHLORIDE SERPL-SCNC: 105 MMOL/L — SIGNIFICANT CHANGE UP (ref 96–108)
CO2 SERPL-SCNC: 27 MMOL/L — SIGNIFICANT CHANGE UP (ref 22–31)
COLOR SPEC: YELLOW — SIGNIFICANT CHANGE UP
COMMENT - URINE: SIGNIFICANT CHANGE UP
CREAT SERPL-MCNC: 1.17 MG/DL — SIGNIFICANT CHANGE UP (ref 0.5–1.3)
DIFF PNL FLD: ABNORMAL
EOSINOPHIL # BLD AUTO: 0.1 K/UL — SIGNIFICANT CHANGE UP (ref 0–0.5)
EOSINOPHIL NFR BLD AUTO: 1.9 % — SIGNIFICANT CHANGE UP (ref 0–6)
EPI CELLS # UR: SIGNIFICANT CHANGE UP /HPF
GLUCOSE SERPL-MCNC: 99 MG/DL — SIGNIFICANT CHANGE UP (ref 70–99)
GLUCOSE UR QL: NEGATIVE — SIGNIFICANT CHANGE UP
HCT VFR BLD CALC: 40.8 % — SIGNIFICANT CHANGE UP (ref 34.5–45)
HGB BLD-MCNC: 13.4 G/DL — SIGNIFICANT CHANGE UP (ref 11.5–15.5)
HYALINE CASTS # UR AUTO: ABNORMAL /LPF
IMM GRANULOCYTES NFR BLD AUTO: 0.2 % — SIGNIFICANT CHANGE UP (ref 0–1.5)
KETONES UR-MCNC: NEGATIVE — SIGNIFICANT CHANGE UP
LEUKOCYTE ESTERASE UR-ACNC: ABNORMAL
LYMPHOCYTES # BLD AUTO: 1.56 K/UL — SIGNIFICANT CHANGE UP (ref 1–3.3)
LYMPHOCYTES # BLD AUTO: 29.8 % — SIGNIFICANT CHANGE UP (ref 13–44)
MCHC RBC-ENTMCNC: 30.1 PG — SIGNIFICANT CHANGE UP (ref 27–34)
MCHC RBC-ENTMCNC: 32.8 GM/DL — SIGNIFICANT CHANGE UP (ref 32–36)
MCV RBC AUTO: 91.7 FL — SIGNIFICANT CHANGE UP (ref 80–100)
MONOCYTES # BLD AUTO: 0.3 K/UL — SIGNIFICANT CHANGE UP (ref 0–0.9)
MONOCYTES NFR BLD AUTO: 5.7 % — SIGNIFICANT CHANGE UP (ref 2–14)
NEUTROPHILS # BLD AUTO: 3.24 K/UL — SIGNIFICANT CHANGE UP (ref 1.8–7.4)
NEUTROPHILS NFR BLD AUTO: 62 % — SIGNIFICANT CHANGE UP (ref 43–77)
NITRITE UR-MCNC: NEGATIVE — SIGNIFICANT CHANGE UP
NRBC # BLD: 0 /100 WBCS — SIGNIFICANT CHANGE UP (ref 0–0)
PH UR: 6.5 — SIGNIFICANT CHANGE UP (ref 5–8)
PLATELET # BLD AUTO: 207 K/UL — SIGNIFICANT CHANGE UP (ref 150–400)
POTASSIUM SERPL-MCNC: 4.1 MMOL/L — SIGNIFICANT CHANGE UP (ref 3.5–5.3)
POTASSIUM SERPL-SCNC: 4.1 MMOL/L — SIGNIFICANT CHANGE UP (ref 3.5–5.3)
PROT SERPL-MCNC: 7.6 G/DL — SIGNIFICANT CHANGE UP (ref 6–8.3)
PROT UR-MCNC: 500 MG/DL
RBC # BLD: 4.45 M/UL — SIGNIFICANT CHANGE UP (ref 3.8–5.2)
RBC # FLD: 12.6 % — SIGNIFICANT CHANGE UP (ref 10.3–14.5)
RBC CASTS # UR COMP ASSIST: ABNORMAL /HPF (ref 0–2)
SARS-COV-2 RNA SPEC QL NAA+PROBE: SIGNIFICANT CHANGE UP
SODIUM SERPL-SCNC: 137 MMOL/L — SIGNIFICANT CHANGE UP (ref 135–145)
SP GR SPEC: 1.01 — SIGNIFICANT CHANGE UP (ref 1.01–1.02)
TROPONIN I SERPL-MCNC: <0.015 NG/ML — SIGNIFICANT CHANGE UP (ref 0–0.04)
UROBILINOGEN FLD QL: NEGATIVE — SIGNIFICANT CHANGE UP
WBC # BLD: 5.23 K/UL — SIGNIFICANT CHANGE UP (ref 3.8–10.5)
WBC # FLD AUTO: 5.23 K/UL — SIGNIFICANT CHANGE UP (ref 3.8–10.5)
WBC UR QL: ABNORMAL /HPF (ref 0–5)

## 2021-07-19 PROCEDURE — 74176 CT ABD & PELVIS W/O CONTRAST: CPT | Mod: 26,MA

## 2021-07-19 PROCEDURE — 71046 X-RAY EXAM CHEST 2 VIEWS: CPT | Mod: 26

## 2021-07-19 PROCEDURE — 99285 EMERGENCY DEPT VISIT HI MDM: CPT

## 2021-07-19 RX ORDER — ACETAMINOPHEN 500 MG
650 TABLET ORAL ONCE
Refills: 0 | Status: COMPLETED | OUTPATIENT
Start: 2021-07-19 | End: 2021-07-19

## 2021-07-19 RX ORDER — CEFTRIAXONE 500 MG/1
1000 INJECTION, POWDER, FOR SOLUTION INTRAMUSCULAR; INTRAVENOUS ONCE
Refills: 0 | Status: COMPLETED | OUTPATIENT
Start: 2021-07-19 | End: 2021-07-19

## 2021-07-19 RX ORDER — CEFTRIAXONE 500 MG/1
1000 INJECTION, POWDER, FOR SOLUTION INTRAMUSCULAR; INTRAVENOUS EVERY 24 HOURS
Refills: 0 | Status: DISCONTINUED | OUTPATIENT
Start: 2021-07-19 | End: 2021-07-20

## 2021-07-19 RX ORDER — ENOXAPARIN SODIUM 100 MG/ML
40 INJECTION SUBCUTANEOUS DAILY
Refills: 0 | Status: DISCONTINUED | OUTPATIENT
Start: 2021-07-19 | End: 2021-07-22

## 2021-07-19 RX ADMIN — Medication 650 MILLIGRAM(S): at 18:42

## 2021-07-19 RX ADMIN — Medication 650 MILLIGRAM(S): at 23:29

## 2021-07-19 RX ADMIN — CEFTRIAXONE 100 MILLIGRAM(S): 500 INJECTION, POWDER, FOR SOLUTION INTRAMUSCULAR; INTRAVENOUS at 19:01

## 2021-07-19 NOTE — H&P ADULT - HISTORY OF PRESENT ILLNESS
55 year old female with PMHx of hypertension,  pacemaker for sick sinus syndrome ([place], ) bladder masses, questionable bladder cancer,  left-sided hydronephrosis and Rt PCN  presents to the ED with complaints of left-sided chest pain and left flank pain x2 weeks. Patient states that she has been feeling left-sided aching chest pain wrapping around to her flank and down the left side of her abdomen. Patient reports that she has history of similar symptoms in the past.  Patient states that she was recently diagnosed with a UTI last month, and states that she was treated for pyelonephritis and completed her full course of antibiotics. She also endorses that her PCN tube isnot draining very well but it drains better when flushing it.  Patient otherwise denies any fever, chills, shortness of breath, cough, hemoptysis, and all other acute complaints.    Pt is a 55 year old female with PMHx of hypertension,  pacemaker for sick sinus syndrome ([place], ) bladder masses, questionable bladder cancer,  left-sided hydronephrosis and Rt PCN  presents to the ED with complaints of left-sided chest pain and left flank pain x2 weeks. Patient states that she has been feeling left-sided aching chest pain wrapping around to her flank and down the left side of her abdomen.   Patient reports that she has history of similar symptoms in the past.  Patient states that she was recently diagnosed with a UTI last month, and states that she was treated for pyelonephritis and completed her full course of antibiotics. She also endorses that her PCN tube isnot draining very well but it drains better when flushing it.    Patient otherwise denies any fever, chills, shortness of breath, cough, hemoptysis, and all other acute complaints.

## 2021-07-19 NOTE — H&P ADULT - PROBLEM SELECTOR PLAN 3
- Patient c/o left chest pain with left flank pain   - EKG showing sinus bradycardia  - Dr Rosario consulted  -pt had a recent echo and stress test in Dr Rosario office was negative,  - trop 1 neg   - f/u trop 2 and D Dimer at  midnight  - no further cardiac work up needed

## 2021-07-19 NOTE — H&P ADULT - PROBLEM SELECTOR PLAN 2
- Patient has past history of bladder mass.   - CT abdomen showing Severe end-stage chronic left hydroureteronephrosis without obstructing calculus presumably related to bladder neoplasm.  Poorly defined bladder soft tissue density consistent with afforded history of bladder neoplasm.  -urology consulted .  -  Continue w/ Rt PCN.   Primary team to contact IR in the AM to ensure drainage as patient is informing that drainage is not well.

## 2021-07-19 NOTE — CONSULT NOTE ADULT - SUBJECTIVE AND OBJECTIVE BOX
CHIEF COMPLAINT:Patient is a 55y old  Female who presents with a chief complaint of cp    HPI:  55 year old female with PMHx of hypertension, bladder masses, questionable bladder cancer, and left-sided hydronephrosis presents to the ED with complaints of left-sided chest pain and left flank pain x2 weeks. Patient states that she has been feeling left-sided aching chest pain wrapping around to her flank and down the left side of her abdomen. Patient reports that she has history of similar symptoms in the past. Patient additionally endorses some hematuria. Patient states that she was recently diagnosed with a UTI last month, and states that she was treated for pyelonephritis and completed her full course of antibiotics. Patient otherwise denies any fever, chills, shortness of breath, cough, hemoptysis, and all other acute complaints    PAST MEDICAL & SURGICAL HISTORY:  Anemia    Asthma    HLD (hyperlipidemia)    Pacemaker    Bladder cancer    HTN (hypertension)    Parathyroid tumor    Liver cyst    Hydronephrosis    H/O myomectomy    MEDICATIONS  (STANDING):  noted    MEDICATIONS  (PRN):      FAMILY HISTORY:  Family history of prostate cancer (Father)    Family history of CHF (congestive heart failure) (Father)    Family history of atrial fibrillation (Father)        SOCIAL HISTORY:    [x ] Non-smoker  [ ] Smoker  [ ] Alcohol    Allergies    No Known Allergies    Intolerances    	    REVIEW OF SYSTEMS:  CONSTITUTIONAL: No fever, weight loss, or fatigue  EYES: No eye pain, visual disturbances, or discharge  ENT:  No difficulty hearing, tinnitus, vertigo; No sinus or throat pain  NECK: No pain or stiffness  RESPIRATORY: No cough, wheezing, chills or hemoptysis; + Shortness of Breath  CARDIOVASCULAR: No chest pain, palpitations, passing out, dizziness, or leg swelling  GASTROINTESTINAL: No abdominal or epigastric pain. No nausea, vomiting, or hematemesis; No diarrhea or constipation. No melena or hematochezia.  GENITOURINARY: No dysuria, frequency, hematuria, or incontinence  NEUROLOGICAL: No headaches, memory loss, loss of strength, numbness, or tremors  SKIN: No itching, burning, rashes, or lesions   LYMPH Nodes: No enlarged glands  ENDOCRINE: No heat or cold intolerance; No hair loss  MUSCULOSKELETAL: No joint pain or swelling; No muscle, back, or extremity pain  PSYCHIATRIC: No depression, anxiety, mood swings, or difficulty sleeping  HEME/LYMPH: No easy bruising, or bleeding gums  ALLERGY AND IMMUNOLOGIC: No hives or eczema	    [ ] All others negative	  [X ] Unable to obtain    PHYSICAL EXAM:  T(C): 36.8 (07-19-21 @ 15:48), Max: 36.8 (07-19-21 @ 15:48)  HR: 60 (07-19-21 @ 15:48) (60 - 69)  BP: 156/77 (07-19-21 @ 15:48) (156/77 - 160/91)  RR: 17 (07-19-21 @ 15:48) (16 - 17)  SpO2: 98% (07-19-21 @ 15:48) (98% - 99%)  Wt(kg): --  I&O's Summary      Appearance: Normal	  HEENT:   Normal oral mucosa, PERRL, EOMI	  Lymphatic: No lymphadenopathy  Cardiovascular: Normal S1 S2, No JVD, + murmurs, No edema  Respiratory: rhonchi  Psychiatry: A & O x 3, Mood & affect appropriate  Gastrointestinal:  Soft, Non-tender, + BS	  Skin: No rashes, No ecchymoses, No cyanosis	  Extremities: Normal range of motion, No clubbing, cyanosis or edema  Vascular: Peripheral pulses palpable 2+ bilaterally    TELEMETRY: 	    ECG:  	  RADIOLOGY:  OTHER: 	  	  LABS:	 	    CARDIAC MARKERS:  CARDIAC MARKERS ( 19 Jul 2021 15:18 )  <0.015 ng/mL / x     / x     / x     / x                                  13.4   5.23  )-----------( 207      ( 19 Jul 2021 15:18 )             40.8     07-19    137  |  105  |  25<H>  ----------------------------<  99  4.1   |  27  |  1.17    Ca    9.4      19 Jul 2021 15:18    TPro  7.6  /  Alb  3.6  /  TBili  0.4  /  DBili  x   /  AST  25  /  ALT  17  /  AlkPhos  91  07-19    proBNP:   Lipid Profile:   HgA1c:   TSH:       PREVIOUS DIAGNOSTIC TESTING:    < from: 12 Lead ECG (06.16.20 @ 13:17) >  Diagnosis Line Sinus bradycardia  Otherwise normal ECG    < from: Transthoracic Echocardiogram (01.29.16 @ 07:16) >  1. Normal mitral valve. Mild mitral regurgitation.  2. Normal trileaflet aortic valve.  3. Normal aortic root.  4. Normal left atrium.  5. Normal left ventricular internal dimensions and wall  thicknesses.  6. Normal LeftVentricular Systolic Function,  (EF = 55 to  60%)  7. Grade II diastolic dysfunction  8. Normal right atrium.  9. Normal right ventricular size and function. A device  lead is visualized in the right heart.  10. RA Pressure is 10 mm Hg.  11. RVS Pressure is 35 mm Hg.  12. There is mild tricuspid regurgitation.  13. Normal pericardium with no pericardial effusion.    < from: CT Abdomen and Pelvis No Cont (07.19.21 @ 17:18) >  Significantly limited by lack of IV contrast.  Severe end-stage chronic left hydroureteronephrosis without obstructing calculus presumably related to bladder neoplasm.  Poorly defined bladder soft tissue density consistent with afforded history of bladder neoplasm.    --Left hydroureteronephrosis likely related to known disease in left upper tract and bladder. As Cr is stable and she does not appear to have an obstructive pyelonephritis, no acute intervention is needed  --Continue w/ Rt PCN. If it begins to drain poorly/cannot be flushed, please contact IR for possible tube exchange  --Continue w/ cardiac workup per medicine   --Patient should be instructed to f/u with Dr. Trinidad at d/c to discuss new PET findings and further options for management.

## 2021-07-19 NOTE — ED PROVIDER NOTE - PMH
Anemia    Asthma    Bladder cancer    HLD (hyperlipidemia)    HTN (hypertension)    Hydronephrosis    Liver cyst    Pacemaker    Parathyroid tumor

## 2021-07-19 NOTE — DISCHARGE NOTE PROVIDER - NSDCMRMEDTOKEN_GEN_ALL_CORE_FT
albuterol CFC free 90 mcg/inh inhalation aerosol: 2 puff(s) inhaled every 6 hours, As needed, Shortness of Breath  Cipro 250 mg oral tablet: 1 tab(s) orally 2 times a day   ondansetron 4 mg oral tablet, disintegratin tab(s) orally every 8 hours, As Needed -for nausea    acetaminophen 325 mg oral tablet: 2 tab(s) orally every 6 hours, As Needed -Mild Pain (1 - 3)   albuterol CFC free 90 mcg/inh inhalation aerosol: 2 puff(s) inhaled every 6 hours, As needed, Shortness of Breath  Augmentin 875 mg-125 mg oral tablet: 1 tab(s) orally every 12 hours   polyethylene glycol 3350 oral powder for reconstitution: 17 gram(s) orally once a day  senna oral tablet: 2 tab(s) orally once a day (at bedtime)

## 2021-07-19 NOTE — DISCHARGE NOTE PROVIDER - DETAILS OF MALNUTRITION DIAGNOSIS/DIAGNOSES
This patient has been assessed with a concern for Malnutrition and was treated during this hospitalization for the following Nutrition diagnosis/diagnoses:     -  07/22/2021: Severe protein-calorie malnutrition   -  07/22/2021: Underweight (BMI < 19)

## 2021-07-19 NOTE — DISCHARGE NOTE PROVIDER - NSDCCAREPROVSEEN_GEN_ALL_CORE_FT
Candelario Ochoa Hoorbod Delshadfar  Hoorbod Delshadfar  Hoorbod Delshadfar Martin Yu Arkadiy Palvanov Lauren Parente Avram Abramowitz Mohsena Amin Ben Meisenheimer Tracy Barilla Charan Mohan Shazia Sohrawardy Brylle Bautista  [facility]  Team [facility] - NP Service

## 2021-07-19 NOTE — DISCHARGE NOTE PROVIDER - HOSPITAL COURSE
Pt is a 55 year old female with PMHx of hypertension,  pacemaker for sick sinus syndrome ([place], ) bladder masses, questionable bladder cancer,  left-sided hydronephrosis and Rt PCN  presents to the ED with complaints of left-sided chest pain and left flank pain x2 weeks. In ED, CT abdomen showing Severe end-stage chronic left hydroureteronephrosis without obstructing calculus presumably related to bladder neoplasm.  Poorly defined bladder soft tissue density consistent with afforded history of bladder neoplasm. UA positive & was started on Rocephin. Urology consulted and As Cr is stable and she does not appear to have an obstructive pyelonephritis, no acute intervention is needed.  ID, Dr Rodriguez consulted.      >>>>>>>>>>>>>>>>>>>>>>>>>>>>>>>>>>>>>>>>>INCOMPLETE>>>>>>>>>>>>>>>>>>>>>>>>>>>>>>>>>>>>>>>>>>>> 55 year old female with past medical history of HTN, SSS s/p PPM, suspected bladder CA c/b left sided hydronephrosis and right percutaneous nephrostomy tube placement who presented to ED with c/o flank pain x 2 weeks. According to patient, she underwent chemotherapeutic treatment at both [facility] [place] and [facility], but was only partially treated. She was f/u with urology Dr. Trinidad who  was instructed to have PET CT first and  decision for bladder biopsy based on PET Scan result. Per Dr. Trinidad, patient likely had under staged CA as she had left hydronephrosis at the time of presentation. Molecular studies were never completed, however, she had a left RP LN biopsy in October that was negative for malignancy. She had neoadjuvant chemo with [place]/Cis x 2 cycles which was discontinued due to pyelonephritis. While she was waiting for urology f/u appt, she came to ED for LT flank pain with decreased output from nephrostomy tube.   Heme/Onc and IR consulted -   while patient was initially thought to have T1 disease in bladder, appearance on recent imaging may suggest T3. However, recently had PET on 7/16/21 demonstrating new FDG-avid left para-aortic LN in RP space, consistent w/ possible mets. Urine culture results from clear catch with no growth. Patient recommended to have nephrostomy tube exchange and heme/onc would not recommend bladder biopsy before LND biopsy as new LND focus would change treatment plan.  Lt PCN tube exchanged by IR on 7/23, draining well. Obtained imaging from [facility] and uploaded to PACS for  IR to weigh in on whether or not LN biopsy is possible.  However, Patient adamantly asking for bladder biopsy not LN biopsy despite of explanation from heme/onc and urology. Thus,  she  decided to  follow up with her own urology Dr. Trinidad . Pt was treated with IV Antibiotic  for complicated UTI per ID, pt will be d/c home on P.O augmentin 875mg q 12hours to complete on 7/29.   pt is clinically improved for discharge and f/u with urology for next level of care discussion for  her bladder mass with new LN.

## 2021-07-19 NOTE — H&P ADULT - PROBLEM SELECTOR PLAN 8
RISK                                                          Points  [] Previous VTE                                           3  [] Thrombophilia                                        2  [] Lower limb paralysis                              2   [x] Current Cancer                                       2   [] Immobilization > 24 hrs                        1  [] ICU/CCU stay > 24 hours                       1  [] Age > 60                                                   1    on lovenox

## 2021-07-19 NOTE — ED PROVIDER NOTE - ENMT, MLM
Detail Level: Detailed Duration Of Freeze Thaw-Cycle (Seconds): 10 Number Of Freeze-Thaw Cycles: 2 freeze-thaw cycles Post-Care Instructions: I reviewed with the patient in detail post-care instructions. Patient is to wear sunprotection, and avoid picking at any of the treated lesions. Pt may apply Vaseline to crusted or scabbing areas. Render Post-Care Instructions In Note?: no Consent: The patient's verbal consent was obtained including but not limited to risks of crusting, scabbing, blistering, scarring, darker or lighter pigmentary change, recurrence, incomplete removal and infection. Airway patent, Nasal mucosa clear. Mouth with normal mucosa. Throat has no vesicles, no oropharyngeal exudates and uvula is midline.

## 2021-07-19 NOTE — CONSULT NOTE ADULT - ASSESSMENT
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 These symptoms are chronic per patient. Seen to have gross left HUN w/ marked cortical thinning w/ rt PCN in place with no hydro. Patient is nonseptic and UA not suggestive of definitive UTI. Cr at patient baseline.     --Left hydroureteronephrosis likely related to known disease in left upper tract and bladder. As Cr is stable and she does not appear to have an obstructive pyelonephritis, no acute intervention is needed  --Continue w/ Rt PCN. If it begins to drain poorly/cannot be flushed, please contact IR for possible tube exchange  --Continue w/ cardiac workup per medicine   --Patient should be instructed to f/u with Dr. Trinidad at d/c to discuss new PET findings and further options for management.     D/w Dr Garcia

## 2021-07-19 NOTE — H&P ADULT - NSHPADDITIONALINFOADULT_GEN_ALL_CORE
Patient evaluated, case discussed with me, chart reviewed, agree with above H/P as reviewed.  Dr. LUIS ANGEL Ochoa ([phone])

## 2021-07-19 NOTE — H&P ADULT - PROBLEM SELECTOR PLAN 1
- Patient presented with left flank pain.   - CT abdomen showing Severe end-stage chronic left hydroureteronephrosis without obstructing calculus presumably related to bladder neoplasm.  Poorly defined bladder soft tissue density consistent with afforded history of bladder neoplasm.  - UA positive   - started on rocephin .  - Patient afebrile, no WBCs .  - urology consulted and As Cr is stable and she does not appear to have an obstructive pyelonephritis, no acute intervention is needed.   - ID consulted Dr Rodriguez.

## 2021-07-19 NOTE — ED PROVIDER NOTE - CLINICAL SUMMARY MEDICAL DECISION MAKING FREE TEXT BOX
5t3 year old female with PMHx of bladder cancer and left hydronephrosis presents to the ED with complaints of left-sided flank pain wrapping around to her chest and left abdomen. High suspicion for  etiology given history, no shortness of breath, tachycardia, or hypoxia. Unlikely PE. Will check labs and obtain imagining.

## 2021-07-19 NOTE — DISCHARGE NOTE PROVIDER - NSDCCPCAREPLAN_GEN_ALL_CORE_FT
PRINCIPAL DISCHARGE DIAGNOSIS  Diagnosis: Pyelonephritis  Assessment and Plan of Treatment: YOu came to ED with complaint of Left flank pain with notably decreased amount of output from right nephrostomy tube  Tube was exchanged by Interventional radiology team on7/23/2021 then draining well  Continue dialy dry dressing   You were treated with IV antibiotic for this issue as per Infectious diseas recommended for your case  You can complete augmentin 875mg 1 tablet every 12 hours until 7/29/2021   Can take tylenol for low grade temperature and make sure you have enough fluids   follow up with your urology within 1 week upon discharge      SECONDARY DISCHARGE DIAGNOSES  Diagnosis: Secondary and unspecified malignant neoplasm of intra-abdominal lymph nodes  Assessment and Plan of Treatment:     Diagnosis: Nephrostomy status  Assessment and Plan of Treatment: Right percutaneous nephrostomy tube exchanged on 7/23/2021   continue dry dressing daily   If you noted to have decreased output call your urology ASAP and follow up with instruction    Diagnosis: Bladder mass  Assessment and Plan of Treatment: follow up with your urology     PRINCIPAL DISCHARGE DIAGNOSIS  Diagnosis: Pyelonephritis  Assessment and Plan of Treatment: YOu came to ED with complaint of Left flank pain with notably decreased amount of output from right nephrostomy tube  Tube was exchanged by Interventional radiology team on7/23/2021 then draining well  Continue dialy dry dressing   You were treated with IV antibiotic for this issue as per Infectious diseas recommended for your case  You can complete augmentin 875mg 1 tablet every 12 hours until 7/29/2021 -- pt called 7/29 claimed that she only took 1 tab of augmentin so far due to side effect such as dizziness, pt asked to change to amoxicillin which ID Dr. Rodriguez is agreeable, sent new prescription to her pharmacy on 7/29   Can take tylenol for low grade temperature and make sure you have enough fluids   follow up with your urology within 1 week upon discharge      SECONDARY DISCHARGE DIAGNOSES  Diagnosis: Secondary and unspecified malignant neoplasm of intra-abdominal lymph nodes  Assessment and Plan of Treatment:     Diagnosis: Nephrostomy status  Assessment and Plan of Treatment: Right percutaneous nephrostomy tube exchanged on 7/23/2021   continue dry dressing daily   If you noted to have decreased output call your urology ASAP and follow up with instruction    Diagnosis: Bladder mass  Assessment and Plan of Treatment: follow up with your urology

## 2021-07-19 NOTE — CONSULT NOTE ADULT - SUBJECTIVE AND OBJECTIVE BOX
Patient is a 55y old  Female who presents with a chief complaint of     55 year old female with PMHx of hypertension, bladder masses, questionable bladder cancer, and left-sided hydronephrosis presents to the ED with complaints of left-sided chest pain and left flank pain x2 weeks. Patient states that she has been feeling left-sided aching chest pain wrapping around to her flank and down the left side of her abdomen. Patient reports that she has history of similar symptoms in the past. Patient additionally endorses some hematuria. Patient states that she was recently diagnosed with a UTI last month, and states that she was treated for pyelonephritis and completed her full course of antibiotics. Patient otherwise denies any fever, chills, shortness of breath, cough, hemoptysis, and all other acute complaints.      REVIEW OF SYSTEMS: Total of twelve systems have been reviewed with patient and found to be negative unless mentioned in HPI        PAST MEDICAL & SURGICAL HISTORY:  Anemia  Asthma  HLD (hyperlipidemia)  Pacemaker  Bladder cancer  HTN (hypertension)  Parathyroid tumor  Liver cyst  Hydronephrosis  H/O myomectomy        SOCIAL HISTORY  Alcohol: Does not drink  Tobacco: Does not smoke  Illicit substance use: None      FAMILY HISTORY: Non contributory to the present illness        ALLERGIES: No Known Allergies      Vital Signs Last 24 Hrs  T(C): 36.5 (2021 20:22), Max: 36.8 (2021 15:48)  T(F): 97.7 (2021 20:22), Max: 98.2 (2021 15:48)  HR: 55 (2021 20:22) (55 - 69)  BP: 130/74 (2021 20:22) (130/74 - 160/91)  BP(mean): --  RR: 18 (2021 20:22) (16 - 18)  SpO2: 100% (2021 20:22) (98% - 100%)      PHYSICAL EXAM:  GENERAL: Not in distress   CHEST/LUNG:  Aire ntry bilaterally  HEART: s1 and s2 present  ABDOMEN:  Nontender and  Nondistended  EXTREMITIES: No pedal  edema  CNS: Awake and Alert      LABS:                        13.4   5.23  )-----------( 207      ( 2021 15:18 )             40.8     07-19    137  |  105  |  25<H>  ----------------------------<  99  4.1   |  27  |  1.17    Ca    9.4      2021 15:18    TPro  7.6  /  Alb  3.6  /  TBili  0.4  /  DBili  x   /  AST  25  /  ALT  17  /  AlkPhos  91          Urinalysis Basic - ( 2021 16:19 )  Color: Yellow / Appearance: very cloudy / S.015 / pH: x  Gluc: x / Ketone: Negative  / Bili: Negative / Urobili: Negative   Blood: x / Protein: 500 mg/dL / Nitrite: Negative   Leuk Esterase: Small / RBC: 25-50 /HPF / WBC 26-50 /HPF   Sq Epi: x / Non Sq Epi: Few /HPF / Bacteria: Moderate /HPF        MEDICATIONS  (STANDING):  cefTRIAXone   IVPB 1000 milliGRAM(s) IV Intermittent every 24 hours  enoxaparin Injectable 40 milliGRAM(s) SubCutaneous daily    MEDICATIONS  (PRN):        RADIOLOGY & ADDITIONAL TESTS:      < from: CT Abdomen and Pelvis No Cont (21 @ 17:18) >  Significantly limited by lack of IV contrast.  Severe end-stage chronic left hydroureteronephrosis without obstructing calculus presumably related to bladder neoplasm.  Poorly defined bladder soft tissue density consistent with afforded history of bladder neoplasm.           Patient is a 55y old  Female with PMHx of hypertension, bladder masses, questionable bladder cancer, and left-sided hydronephrosis presents to the ER for evaluation of left-sided chest pain and left flank pain x2 weeks. Patient states that she has been feeling left-sided aching chest pain wrapping around to her flank and down the left side of her abdomen. Patient reports that she has history of similar symptoms in the past.  Sterling was recently diagnosed with a UTI last month, and states that she was treated for pyelonephritis and completed her full course of antibiotics. ON admission, she has no fever or Leukocytosis but positive urine analysis. The CT abd/pelvis shows Severe end-stage chronic left hydroureteronephrosis without obstructing calculus presumably related to bladder neoplasm. She has started on Ceftriaxone and the ID consult requested to assist with further evaluation and antibiotic management.        \REVIEW OF SYSTEMS: Total of twelve systems have been reviewed with patient and found to be negative unless mentioned in HPI        PAST MEDICAL & SURGICAL HISTORY:  Anemia  Asthma  HLD (hyperlipidemia)  Pacemaker  Bladder cancer  HTN (hypertension)  Parathyroid tumor  Liver cyst  Hydronephrosis  H/O myomectomy      SOCIAL HISTORY  Alcohol: Does not drink  Tobacco: Does not smoke  Illicit substance use: None      FAMILY HISTORY: Non contributory to the present illness      ALLERGIES: No Known Allergies      Vital Signs Last 24 Hrs  T(C): 36.5 (2021 20:22), Max: 36.8 (2021 15:48)  T(F): 97.7 (2021 20:22), Max: 98.2 (2021 15:48)  HR: 55 (2021 20:22) (55 - 69)  BP: 130/74 (2021 20:22) (130/74 - 160/91)  BP(mean): --  RR: 18 (2021 20:22) (16 - 18)  SpO2: 100% (2021 20:22) (98% - 100%)      PHYSICAL EXAM:  GENERAL: Not in distress   CHEST/LUNG: Not using accessory muscles   HEART: s1 and s2 present  ABDOMEN:  Nontender and  Nondistended  : Right PCN in placed   EXTREMITIES: No pedal  edema  CNS: Awake and Alert      LABS:                        13.4   5.23  )-----------( 207      ( 2021 15:18 )             40.8       07-19    137  |  105  |  25<H>  ----------------------------<  99  4.1   |  27  |  1.17    Ca    9.4      2021 15:18    TPro  7.6  /  Alb  3.6  /  TBili  0.4  /  DBili  x   /  AST  25  /  ALT  17  /  AlkPhos  91          Urinalysis Basic - ( 2021 16:19 )  Color: Yellow / Appearance: very cloudy / S.015 / pH: x  Gluc: x / Ketone: Negative  / Bili: Negative / Urobili: Negative   Blood: x / Protein: 500 mg/dL / Nitrite: Negative   Leuk Esterase: Small / RBC: 25-50 /HPF / WBC 26-50 /HPF   Sq Epi: x / Non Sq Epi: Few /HPF / Bacteria: Moderate /HPF        MEDICATIONS  (STANDING):  cefTRIAXone   IVPB 1000 milliGRAM(s) IV Intermittent every 24 hours  enoxaparin Injectable 40 milliGRAM(s) SubCutaneous daily    MEDICATIONS  (PRN):        RADIOLOGY & ADDITIONAL TESTS:     CT Abdomen and Pelvis No Cont (21 @ 17:18) Significantly limited by lack of IV contrast.  Severe end-stage chronic left hydroureteronephrosis without obstructing calculus presumably related to bladder neoplasm.  Poorly defined bladder soft tissue density consistent with afforded history of bladder neoplasm.

## 2021-07-19 NOTE — H&P ADULT - NSHPPHYSICALEXAM_GEN_ALL_CORE
ICU Vital Signs Last 24 Hrs  T(C): 36.5 (19 Jul 2021 20:22), Max: 36.8 (19 Jul 2021 15:48)  T(F): 97.7 (19 Jul 2021 20:22), Max: 98.2 (19 Jul 2021 15:48)  HR: 55 (19 Jul 2021 20:22) (55 - 69)  BP: 130/74 (19 Jul 2021 20:22) (130/74 - 160/91)  BP(mean): --  ABP: --  ABP(mean): --  RR: 18 (19 Jul 2021 20:22) (16 - 18)  SpO2: 100% (19 Jul 2021 20:22) (98% - 100%)

## 2021-07-19 NOTE — CONSULT NOTE ADULT - ASSESSMENT
Patient is a 55y old  Female with PMHx of hypertension, bladder masses, questionable bladder cancer, and left-sided hydronephrosis presents to the ER for evaluation of left-sided chest pain and left flank pain x2 weeks. Patient states that she has been feeling left-sided aching chest pain wrapping around to her flank and down the left side of her abdomen. Patient reports that she has history of similar symptoms in the past.  Sterling was recently diagnosed with a UTI last month, and states that she was treated for pyelonephritis and completed her full course of antibiotics. ON admission, she has no fever or Leukocytosis but positive urine analysis. The CT abd/pelvis shows Severe end-stage chronic left hydroureteronephrosis without obstructing calculus presumably related to bladder neoplasm. She has started on Ceftriaxone and the ID consult requested to assist with further evaluation and antibiotic management.        #Complicated  UTI  - in the setting of Right PCN  # Chronic left hydroureteronephrosis without obstructing calculus   # Bladder cancer    would recommend:    1. Follow up urine culture  2. Please flush the Right PCN since not draining well,   3. IF cannot be flushed, please contact IR for possible tube exchange as per Urology  4. Change Ceftriaxone to Cefepime until work up is done  5. Monitor  kidney function   6. Pain management as needed     d/w patient and Nursing staff    will follow the patient with you and make further recommendation based on the clinical course and Lab results  Thank you for the opportunity to participate in Ms. HOUSE's care      Attending Attestation:    Spent more than 65 minutes on total encounter, more than 50 % of the visit was spent counseling and/or coordinating care by the Attending physician.

## 2021-07-19 NOTE — H&P ADULT - NSICDXPASTMEDICALHX_GEN_ALL_CORE_FT
PAST MEDICAL HISTORY:  Anemia     Asthma     Bladder cancer     HLD (hyperlipidemia)     HTN (hypertension)     Hydronephrosis     Liver cyst     Pacemaker     Parathyroid tumor

## 2021-07-19 NOTE — H&P ADULT - ASSESSMENT
55 year old female with PMHx of hypertension,  pacemaker for sick sinus syndrome ([facility], ) bladder masses, questionable bladder cancer,  left-sided hydronephrosis and Rt PCN  presents to the ED with complaints of left-sided chest pain and left flank pain x2 weeks

## 2021-07-19 NOTE — H&P ADULT - PROBLEM SELECTOR PLAN 4
Patient has past history of bladder mass.   - CT abdomen showing Severe end-stage chronic left hydroureteronephrosis without obstructing calculus presumably related to bladder neoplasm.  Poorly defined bladder soft tissue density consistent with afforded history of bladder neoplasm.  -urology consulted .  - [facility] group consulted ( Zoë)

## 2021-07-19 NOTE — ED PROVIDER NOTE - PROGRESS NOTE DETAILS
pt refused CT w contrast, wanted CT dry. Explained and discussed risks of contrast vs loimitations of dry scan, but pt stated given her history, although Cr is normal, does not want contrast at this time. Dry CT performed. +UTI/pyelo. Suscep to ctx, IV given. Spoke w uro who stated medical admission. Admitted to Dr. Ochoa for end stage hydronephrosis w pyelo req IV abx in setting of metastatic bladder ca.

## 2021-07-19 NOTE — DISCHARGE NOTE PROVIDER - PROVIDER TOKENS
PROVIDER:[TOKEN:[[id number]],FOLLOWUP:[1 week]],PROVIDER:[TOKEN:[[id number]],FOLLOWUP:[1 week]]

## 2021-07-19 NOTE — DISCHARGE NOTE PROVIDER - CARE PROVIDER_API CALL
Renay Trinidad)  Urology  [address] [place] [id number]  Phone: [phone]  Fax: [phone]  Follow Up Time: 1 week    Tabatha Garcia)  HematologyOncology; Internal Medicine; Medical Oncology  [address] [facility], [address]  [place], [place] [id number]  Phone: [phone]  Fax: [phone]  Follow Up Time: 1 week

## 2021-07-19 NOTE — ED PROVIDER NOTE - OBJECTIVE STATEMENT
55 year old female with PMHx of hypertension, bladder masses, questionable bladder cancer, and left-sided hydronephrosis presents to the ED with complaints of left-sided chest pain and left flank pain x2 weeks. Patient states that she has been feeling left-sided aching chest pain wrapping around to her flank and down the left side of her abdomen. Patient reports that she has history of similar symptoms in the past. Patient additionally endorses some hematuria. Patient states that she was recently diagnosed with a UTI last month, and states that she was treated for pyelonephritis and completed her full course of antibiotics. Patient otherwise denies any fever, chills, shortness of breath, cough, hemoptysis, and all other acute complaints. NKDA.

## 2021-07-19 NOTE — CONSULT NOTE ADULT - ASSESSMENT
55 year old female with PMHx of hypertension, bladder masses, questionable bladder cancer, and left-sided hydronephrosis presents to the ED with complaints of left-sided chest pain and left flank pain x2 weeks. Patient states that she has been feeling left-sided aching chest pain wrapping around to her flank and down the left side of her abdomen. Patient reports that she has history of similar symptoms in the past. Patient additionally endorses some hematuria. Patient states that she was recently diagnosed with a UTI last month, and states that she was treated for pyelonephritis and completed her full course of antibiotics. Patient otherwise denies any fever, chills, shortness of breath, cough, hemoptysis, and all other acute complaints  pt is a 56 yo well known to me with hx of bradycardia/ sss, s/p ppm now  with chest pain.  check cardiac enzymes  tsh  check d dimer r/o PE  pt had a recent echo and stress test in my office was negative, will review  PLEASE DO NOT ORDER ANY CARDIAC TESTING

## 2021-07-19 NOTE — CONSULT NOTE ADULT - SUBJECTIVE AND OBJECTIVE BOX
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 . Patient states that she has been feeling left-sided aching chest pain wrapping around to her flank and down the left side of her abdomen. Patient reports that she has history of similar symptoms in the past. Patient additionally endorses some hematuria. Patient states that she was recently diagnosed with a UTI last month, and states that she was treated for pyelonephritis and completed her full course of antibiotics. She follows with Dr. Trinidad. While patient was initially thought to have T1 disease in bladder, appearance on recent imaging may suggest T3. However, recently had PET on 21 demonstrating new FDG-avid left para-aortic LN in RP space, consistent w/ possible met.     Currently has no fevers, chills, nausea/emesis. States that many of her symptoms that brought her in today are chronic.       PAST MEDICAL & SURGICAL HISTORY:  Anemia    Asthma    HLD (hyperlipidemia)    Pacemaker    Bladder cancer    HTN (hypertension)    Parathyroid tumor    Liver cyst    Hydronephrosis    H/O myomectomy        MEDICATIONS  (STANDING):    MEDICATIONS  (PRN):      FAMILY HISTORY:  Family history of prostate cancer (Father)    Family history of CHF (congestive heart failure) (Father)    Family history of atrial fibrillation (Father)        Allergies    No Known Allergies    Intolerances        SOCIAL HISTORY:    REVIEW OF SYSTEMS: Otherwise negative as stated in HPI    Physical Exam  Vital signs  T(C): 36.8 (21 @ 15:48), Max: 36.8 (21 @ 15:48)  HR: 60 (21 @ 15:48)  BP: 156/77 (21 @ 15:48)  SpO2: 98% (21 @ 15:48)  Wt(kg): --    Output      Gen: emaciated in appearance but NAD   Pulm: Normal work of breathing on RA   CV: Regular rate  Abd: Soft, nontender, nondistended  : R PCN in place, draining urine. No CVA TTP bilaterally.   Msk: Moving all 4 extremities equally. No gross weakness appreciated  Skin: No rashes or skin breakdown visible   Psych:  AOx3  Neuro: No focal deficits.       LABS:       @ 15:18    WBC 5.23  / Hct 40.8  / SCr 1.17         137  |  105  |  25<H>  ----------------------------<  99  4.1   |  27  |  1.17    Ca    9.4      2021 15:18    TPro  7.6  /  Alb  3.6  /  TBili  0.4  /  DBili  x   /  AST  25  /  ALT  17  /  AlkPhos  91        Urinalysis Basic - ( 2021 16:19 )    Color: Yellow / Appearance: very cloudy / S.015 / pH: x  Gluc: x / Ketone: Negative  / Bili: Negative / Urobili: Negative   Blood: x / Protein: 500 mg/dL / Nitrite: Negative   Leuk Esterase: Small / RBC: 25-50 /HPF / WBC 26-50 /HPF   Sq Epi: x / Non Sq Epi: Few /HPF / Bacteria: Moderate /HPF        Urine Cx: p   Blood Cx: p     RADIOLOGY:  < from: CT Abdomen and Pelvis No Cont (21 @ 17:18) >    EXAM:  CT ABDOMEN AND PELVIS                            PROCEDURE DATE:  2021          INTERPRETATION:  CLINICAL INFORMATION: Left flank pain. Antecedent history of bladder cancer    COMPARISON: 2019    CONTRAST/COMPLICATIONS:  IV Contrast: NONE  Oral Contrast: NONE  Complications: None reported at time of study completion    PROCEDURE:  CT of the Abdomen and Pelvis was performed.  Sagittal and coronal reformats were performed.  Evaluation solid organ parenchyma and urinary tract significantly limited by lack of IV contrast.  FINDINGS:  LOWER CHEST: In situ cardiac pacer. Clear lung bases..    LIVER: Within normal limits.  BILE DUCTS: Normal caliber.  GALLBLADDER: Within normal limits.  SPLEEN: Within normal limits.  PANCREAS: Within normal limits.  ADRENALS: Within normal limits.  KIDNEYS/URETERS: Severe end-stage chronic left hydroureteronephrosis with marked thinning of the cortical mantle, worsened compared to prior. No obstructing calculus. Right nephrostomy catheter in situ. No hydronephrosis or urolithiasis on the right.    BLADDER: Extensive poorly defined lobulated soft tissue in the bladder consistent with the history of bladder neoplasm. Associated hematoma difficult to exclude.  REPRODUCTIVE ORGANS: No gynecologic mass    BOWEL: No bowel obstruction. Appendix no appendicitis  PERITONEUM: No ascites.  VESSELS: Nonaneurysmal.  RETROPERITONEUM/LYMPH NODES: No lymphadenopathy.  ABDOMINAL WALL: Within normal limits.  BONES: No acute or aggressive pathology.    IMPRESSION:  Significantly limited by lack of IV contrast.  Severe end-stage chronic left hydroureteronephrosis without obstructing calculus presumably related to bladder neoplasm.  Poorly defined bladder soft tissue density consistent with afforded history of bladder neoplasm.      < end of copied text >

## 2021-07-19 NOTE — ED ADULT NURSE NOTE - OBJECTIVE STATEMENT
Pt. c/o left sided chest pain and left sided flank pain ongoing for 2 weeks. Pt. stated she has had similar symptoms in the past and was seen y cardiologist. Pt. has pacemaker. Pt. finished course of antibiotics for UTI recently. Pt. c/o left sided chest pain and left sided flank pain ongoing for 2 weeks. Pt. stated she has had similar symptoms in the past and was seen y cardiologist. Pt. has pacemaker. Pt. finished course of antibiotics for UTI recently. Pt. has a nephrostomy tue.

## 2021-07-19 NOTE — H&P ADULT - NSICDXFAMILYHX_GEN_ALL_CORE_FT
FAMILY HISTORY:  Father  Still living? Unknown  Family history of atrial fibrillation, Age at diagnosis: Age Unknown  Family history of CHF (congestive heart failure), Age at diagnosis: Age Unknown  Family history of prostate cancer, Age at diagnosis: Age Unknown

## 2021-07-20 LAB
A1C WITH ESTIMATED AVERAGE GLUCOSE RESULT: 5.5 % — SIGNIFICANT CHANGE UP (ref 4–5.6)
ALBUMIN SERPL ELPH-MCNC: 3.8 G/DL — SIGNIFICANT CHANGE UP (ref 3.5–5)
ALP SERPL-CCNC: 93 U/L — SIGNIFICANT CHANGE UP (ref 40–120)
ALT FLD-CCNC: 16 U/L DA — SIGNIFICANT CHANGE UP (ref 10–60)
ANION GAP SERPL CALC-SCNC: 10 MMOL/L — SIGNIFICANT CHANGE UP (ref 5–17)
AST SERPL-CCNC: 18 U/L — SIGNIFICANT CHANGE UP (ref 10–40)
BASOPHILS # BLD AUTO: 0.01 K/UL — SIGNIFICANT CHANGE UP (ref 0–0.2)
BASOPHILS NFR BLD AUTO: 0.2 % — SIGNIFICANT CHANGE UP (ref 0–2)
BILIRUB SERPL-MCNC: 0.6 MG/DL — SIGNIFICANT CHANGE UP (ref 0.2–1.2)
BUN SERPL-MCNC: 24 MG/DL — HIGH (ref 7–18)
CALCIUM SERPL-MCNC: 9.9 MG/DL — SIGNIFICANT CHANGE UP (ref 8.4–10.5)
CHLORIDE SERPL-SCNC: 104 MMOL/L — SIGNIFICANT CHANGE UP (ref 96–108)
CHOLEST SERPL-MCNC: 243 MG/DL — HIGH
CO2 SERPL-SCNC: 25 MMOL/L — SIGNIFICANT CHANGE UP (ref 22–31)
COVID-19 SPIKE DOMAIN AB INTERP: NEGATIVE — SIGNIFICANT CHANGE UP
COVID-19 SPIKE DOMAIN ANTIBODY RESULT: 0.4 U/ML — SIGNIFICANT CHANGE UP
CREAT SERPL-MCNC: 1.1 MG/DL — SIGNIFICANT CHANGE UP (ref 0.5–1.3)
CULTURE RESULTS: SIGNIFICANT CHANGE UP
D DIMER BLD IA.RAPID-MCNC: 170 NG/ML DDU — SIGNIFICANT CHANGE UP
EOSINOPHIL # BLD AUTO: 0.1 K/UL — SIGNIFICANT CHANGE UP (ref 0–0.5)
EOSINOPHIL NFR BLD AUTO: 2.2 % — SIGNIFICANT CHANGE UP (ref 0–6)
ESTIMATED AVERAGE GLUCOSE: 111 MG/DL — SIGNIFICANT CHANGE UP (ref 68–114)
GLUCOSE SERPL-MCNC: 73 MG/DL — SIGNIFICANT CHANGE UP (ref 70–99)
HCT VFR BLD CALC: 41.2 % — SIGNIFICANT CHANGE UP (ref 34.5–45)
HDLC SERPL-MCNC: 74 MG/DL — SIGNIFICANT CHANGE UP
HGB BLD-MCNC: 13.6 G/DL — SIGNIFICANT CHANGE UP (ref 11.5–15.5)
IMM GRANULOCYTES NFR BLD AUTO: 0.2 % — SIGNIFICANT CHANGE UP (ref 0–1.5)
LIPID PNL WITH DIRECT LDL SERPL: 155 MG/DL — HIGH
LYMPHOCYTES # BLD AUTO: 1.73 K/UL — SIGNIFICANT CHANGE UP (ref 1–3.3)
LYMPHOCYTES # BLD AUTO: 38.3 % — SIGNIFICANT CHANGE UP (ref 13–44)
MAGNESIUM SERPL-MCNC: 2.3 MG/DL — SIGNIFICANT CHANGE UP (ref 1.6–2.6)
MCHC RBC-ENTMCNC: 29.9 PG — SIGNIFICANT CHANGE UP (ref 27–34)
MCHC RBC-ENTMCNC: 33 GM/DL — SIGNIFICANT CHANGE UP (ref 32–36)
MCV RBC AUTO: 90.5 FL — SIGNIFICANT CHANGE UP (ref 80–100)
MONOCYTES # BLD AUTO: 0.26 K/UL — SIGNIFICANT CHANGE UP (ref 0–0.9)
MONOCYTES NFR BLD AUTO: 5.8 % — SIGNIFICANT CHANGE UP (ref 2–14)
NEUTROPHILS # BLD AUTO: 2.41 K/UL — SIGNIFICANT CHANGE UP (ref 1.8–7.4)
NEUTROPHILS NFR BLD AUTO: 53.3 % — SIGNIFICANT CHANGE UP (ref 43–77)
NON HDL CHOLESTEROL: 169 MG/DL — HIGH
NRBC # BLD: 0 /100 WBCS — SIGNIFICANT CHANGE UP (ref 0–0)
PHOSPHATE SERPL-MCNC: 3.3 MG/DL — SIGNIFICANT CHANGE UP (ref 2.5–4.5)
PLATELET # BLD AUTO: 214 K/UL — SIGNIFICANT CHANGE UP (ref 150–400)
POTASSIUM SERPL-MCNC: 3.9 MMOL/L — SIGNIFICANT CHANGE UP (ref 3.5–5.3)
POTASSIUM SERPL-SCNC: 3.9 MMOL/L — SIGNIFICANT CHANGE UP (ref 3.5–5.3)
PROT SERPL-MCNC: 7.8 G/DL — SIGNIFICANT CHANGE UP (ref 6–8.3)
RBC # BLD: 4.55 M/UL — SIGNIFICANT CHANGE UP (ref 3.8–5.2)
RBC # FLD: 12.7 % — SIGNIFICANT CHANGE UP (ref 10.3–14.5)
SARS-COV-2 IGG+IGM SERPL QL IA: 0.4 U/ML — SIGNIFICANT CHANGE UP
SARS-COV-2 IGG+IGM SERPL QL IA: NEGATIVE — SIGNIFICANT CHANGE UP
SODIUM SERPL-SCNC: 139 MMOL/L — SIGNIFICANT CHANGE UP (ref 135–145)
SPECIMEN SOURCE: SIGNIFICANT CHANGE UP
TRIGL SERPL-MCNC: 71 MG/DL — SIGNIFICANT CHANGE UP
TROPONIN I SERPL-MCNC: <0.015 NG/ML — SIGNIFICANT CHANGE UP (ref 0–0.04)
TSH SERPL-MCNC: 0.65 UU/ML — SIGNIFICANT CHANGE UP (ref 0.34–4.82)
VIT B12 SERPL-MCNC: >2000 PG/ML — HIGH (ref 232–1245)
WBC # BLD: 4.52 K/UL — SIGNIFICANT CHANGE UP (ref 3.8–10.5)
WBC # FLD AUTO: 4.52 K/UL — SIGNIFICANT CHANGE UP (ref 3.8–10.5)

## 2021-07-20 RX ORDER — SENNA PLUS 8.6 MG/1
2 TABLET ORAL AT BEDTIME
Refills: 0 | Status: DISCONTINUED | OUTPATIENT
Start: 2021-07-20 | End: 2021-07-27

## 2021-07-20 RX ORDER — CEFEPIME 1 G/1
1000 INJECTION, POWDER, FOR SOLUTION INTRAMUSCULAR; INTRAVENOUS EVERY 8 HOURS
Refills: 0 | Status: DISCONTINUED | OUTPATIENT
Start: 2021-07-20 | End: 2021-07-21

## 2021-07-20 RX ORDER — POLYETHYLENE GLYCOL 3350 17 G/17G
17 POWDER, FOR SOLUTION ORAL DAILY
Refills: 0 | Status: DISCONTINUED | OUTPATIENT
Start: 2021-07-20 | End: 2021-07-27

## 2021-07-20 RX ADMIN — CEFEPIME 100 MILLIGRAM(S): 1 INJECTION, POWDER, FOR SOLUTION INTRAMUSCULAR; INTRAVENOUS at 21:17

## 2021-07-20 RX ADMIN — CEFEPIME 100 MILLIGRAM(S): 1 INJECTION, POWDER, FOR SOLUTION INTRAMUSCULAR; INTRAVENOUS at 13:45

## 2021-07-20 RX ADMIN — SENNA PLUS 2 TABLET(S): 8.6 TABLET ORAL at 21:17

## 2021-07-20 NOTE — CONSULT NOTE ADULT - ASSESSMENT
complete note to follow   Pt is a 55 year old female with PMHx of hypertension,  pacemaker for sick sinus syndrome ([facility], ) bladder masses, questionable bladder cancer,  left-sided hydronephrosis and Rt PCN  presents to the ED with complaints of left-sided chest pain and left flank pain x2 weeks. Patient states that she has been feeling left-sided aching chest pain wrapping around to her flank and down the left side of her abdomen.   Patient reports that she has history of similar symptoms in the past.  Patient states that she was recently diagnosed with a UTI last month, and states that she was treated for pyelonephritis and completed her full course of antibiotics. She also endorses that her PCN tube isnot draining very well but it drains better when flushing it.      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 Trinidad [phone]  CT shows end-stage Left hydro, Right NT no hydro and extensive poorly defined lobulated bladder mass  Rec's:  -Left message for Dr. Trinidad to obtain more details about Dx/path, staging, tx etc.  -On abx for pyelonephritis, ID following  -Await Urology consult  -pt has not f/u with [facility] and would like to f/u in our office for Medical Oncology. Prognosis/Tx pending addt'l information  -further recommendations pending above    Thank you for the referral. Will continue to monitor the patient.  Please call with any questions [phone]  Above reviewed with Attending Dr. Garcia

## 2021-07-20 NOTE — PROGRESS NOTE ADULT - PROBLEM SELECTOR PLAN 1
- Patient presented with left flank pain.   - CT abdomen showing Severe end-stage chronic left hydroureteronephrosis without obstructing calculus presumably related to bladder neoplasm.  Poorly defined bladder soft tissue density consistent with afforded history of bladder neoplasm.  - UA weakly positive   - Patient afebrile, no WBCs .  - Appreciate Urology recs- As Cr is stable and she does not appear to have an obstructive pyelonephritis, no acute intervention is needed  - Appreciate ID Dr Jennifer lopez- changed Rocephin to Cefepime - Patient presented with left flank pain. Patient afebrile, no WBCs  - CT abdomen findings reviewed showing Severe end-stage chronic left hydroureteronephrosis without obstructing calculus presumably related to bladder neoplasm. Poorly defined bladder soft tissue density consistent with afforded history of bladder neoplasm  - UA weakly positive, pending urine Cx   - Appreciate Urology recs- As Cr is stable and pt does not appear to have an obstructive pyelonephritis, no acute intervention is needed  - Appreciate ID Dr Jennifer lopez- changed Rocephin to Cefepime 1g q8 at present

## 2021-07-20 NOTE — PROGRESS NOTE ADULT - SUBJECTIVE AND OBJECTIVE BOX
Patient is seen and examined at the bed side, is afebrile.      \REVIEW OF SYSTEMS: All other review systems are negative      ALLERGIES: No Known Allergies      Vital Signs Last 24 Hrs  T(C): 36.6 (2021 20:29), Max: 36.6 (2021 12:59)  T(F): 97.8 (2021 20:29), Max: 97.9 (2021 12:59)  HR: 5 (2021 20:29) (5 - 58)  BP: 118/69 (2021 20:29) (118/69 - 162/70)  BP(mean): --  RR: 16 (2021 20:29) (16 - 17)  SpO2: 99% (2021 20:29) (99% - 100%)        PHYSICAL EXAM:  GENERAL: Not in distress   CHEST/LUNG: Not using accessory muscles   HEART: s1 and s2 present  ABDOMEN:  Nontender and  Nondistended  : Right PCN in placed   EXTREMITIES: No pedal  edema  CNS: Awake and Alert      LABS:                          13.6   4.52  )-----------( 214      ( 2021 08:18 )             41.2                           13.4   5.23  )-----------( 207      ( 2021 15:18 )             40.8     07-20    139  |  104  |  24<H>  ----------------------------<  73  3.9   |  25  |  1.10    Ca    9.9      2021 08:18  Phos  3.3     07-20  Mg     2.3     07-20    TPro  7.8  /  Alb  3.8  /  TBili  0.6  /  DBili  x   /  AST  18  /  ALT  16  /  AlkPhos  93  07-20    07-19    137  |  105  |  25<H>  ----------------------------<  99  4.1   |  27  |  1.17    Ca    9.4      2021 15:18    TPro  7.6  /  Alb  3.6  /  TBili  0.4  /  DBili  x   /  AST  25  /  ALT  17  /  AlkPhos  91  07-19        Urinalysis Basic - ( 2021 16:19 )  Color: Yellow / Appearance: very cloudy / S.015 / pH: x  Gluc: x / Ketone: Negative  / Bili: Negative / Urobili: Negative   Blood: x / Protein: 500 mg/dL / Nitrite: Negative   Leuk Esterase: Small / RBC: 25-50 /HPF / WBC 26-50 /HPF   Sq Epi: x / Non Sq Epi: Few /HPF / Bacteria: Moderate /HPF        MEDICATIONS  (STANDING):    cefepime   IVPB 1000 milliGRAM(s) IV Intermittent every 8 hours  enoxaparin Injectable 40 milliGRAM(s) SubCutaneous daily  polyethylene glycol 3350 17 Gram(s) Oral daily  senna 2 Tablet(s) Oral at bedtime        RADIOLOGY & ADDITIONAL TESTS:     CT Abdomen and Pelvis No Cont (21 @ 17:18) Significantly limited by lack of IV contrast.  Severe end-stage chronic left hydroureteronephrosis without obstructing calculus presumably related to bladder neoplasm.  Poorly defined bladder soft tissue density consistent with afforded history of bladder neoplasm.         Patient is seen and examined at the bed side, is afebrile.   She mentioned feeling little better, BUT Right PCN has to flushed often.       \REVIEW OF SYSTEMS: All other review systems are negative      ALLERGIES: No Known Allergies      Vital Signs Last 24 Hrs  T(C): 36.6 (2021 20:29), Max: 36.6 (2021 12:59)  T(F): 97.8 (2021 20:29), Max: 97.9 (2021 12:59)  HR: 5 (2021 20:29) (5 - 58)  BP: 118/69 (2021 20:29) (118/69 - 162/70)  BP(mean): --  RR: 16 (2021 20:29) (16 - 17)  SpO2: 99% (2021 20:29) (99% - 100%)      PHYSICAL EXAM:  GENERAL: Not in distress   CHEST/LUNG: Not using accessory muscles   HEART: s1 and s2 present  ABDOMEN:  Nontender and  Nondistended  : Right PCN in placed, draining yellow color urine  EXTREMITIES: No pedal  edema  CNS: Awake and Alert      LABS:                          13.6   4.52  )-----------( 214      ( 2021 08:18 )             41.2                           13.4   5.23  )-----------( 207      ( 2021 15:18 )             40.8     07-20    139  |  104  |  24<H>  ----------------------------<  73  3.9   |  25  |  1.10    Ca    9.9      2021 08:18  Phos  3.3     07-20  Mg     2.3     07-20    TPro  7.8  /  Alb  3.8  /  TBili  0.6  /  DBili  x   /  AST  18  /  ALT  16  /  AlkPhos  93  07-20    07-19    137  |  105  |  25<H>  ----------------------------<  99  4.1   |  27  |  1.17    Ca    9.4      2021 15:18    TPro  7.6  /  Alb  3.6  /  TBili  0.4  /  DBili  x   /  AST  25  /  ALT  17  /  AlkPhos  91          Urinalysis Basic - ( 2021 16:19 )  Color: Yellow / Appearance: very cloudy / S.015 / pH: x  Gluc: x / Ketone: Negative  / Bili: Negative / Urobili: Negative   Blood: x / Protein: 500 mg/dL / Nitrite: Negative   Leuk Esterase: Small / RBC: 25-50 /HPF / WBC 26-50 /HPF   Sq Epi: x / Non Sq Epi: Few /HPF / Bacteria: Moderate /HPF        MEDICATIONS  (STANDING):    cefepime   IVPB 1000 milliGRAM(s) IV Intermittent every 8 hours  enoxaparin Injectable 40 milliGRAM(s) SubCutaneous daily  polyethylene glycol 3350 17 Gram(s) Oral daily  senna 2 Tablet(s) Oral at bedtime        RADIOLOGY & ADDITIONAL TESTS:     CT Abdomen and Pelvis No Cont (21 @ 17:18) Significantly limited by lack of IV contrast.  Severe end-stage chronic left hydroureteronephrosis without obstructing calculus presumably related to bladder neoplasm.  Poorly defined bladder soft tissue density consistent with afforded history of bladder neoplasm.

## 2021-07-20 NOTE — PROGRESS NOTE ADULT - PROBLEM SELECTOR PLAN 2
- Patient has past history of bladder mass.   - CT abdomen showing Severe end-stage chronic left hydroureteronephrosis without obstructing calculus presumably related to bladder neoplasm. Poorly defined bladder soft tissue density consistent with afforded history of bladder neoplasm.  - Appreciate Urology recs - Continue w/ Rt PCN. If Rt PCN begins to drain poorly/cannot be flushed, please contact IR for possible tube exchange  - Patient should be instructed to f/u with Dr. Trinidad at d/c to discuss new PET findings and further options for management  - IR serafin - Patient has past history of bladder mass  - CT abdomen showing Severe end-stage chronic left hydroureteronephrosis without obstructing calculus presumably related to bladder neoplasm. Poorly defined bladder soft tissue density consistent with afforded history of bladder neoplasm  - Appreciate Urology recs -c/w Rt PCN. Pt reports right NT drains poorly, flushed it couple of times overnight. Awaiting Urine culture results from NT and clear catch  - Contacted IR to eval NT & possible tube exchange  - pt to f/u with Dr Trinidad at d/c to discuss new PET findings and further options for management

## 2021-07-20 NOTE — CONSULT NOTE ADULT - SUBJECTIVE AND OBJECTIVE BOX
History of Present Illness:  History of Present Illness:   Pt is a 55 year old female with PMHx of hypertension,  pacemaker for sick sinus syndrome ([place], ) bladder masses, questionable bladder cancer,  left-sided hydronephrosis and Rt PCN  presents to the ED with complaints of left-sided chest pain and left flank pain x2 weeks. Patient states that she has been feeling left-sided aching chest pain wrapping around to her flank and down the left side of her abdomen.   Patient reports that she has history of similar symptoms in the past.  Patient states that she was recently diagnosed with a UTI last month, and states that she was treated for pyelonephritis and completed her full course of antibiotics. She also endorses that her PCN tube isnot draining very well but it drains better when flushing it.    Patient otherwise denies any fever, chills, shortness of breath, cough, hemoptysis, and all other acute complaints.        Review of Systems:  Review of Systems: as above, otherwise negative  Other Review of Systems: All other review of systems negative, except as noted in HPI      Allergies and Intolerances:        Allergies:  	No Known Allergies:     Home Medications:   * Patient Currently Takes Medications as of 2020 12:53 documented in Structured Notes  · 	ondansetron 4 mg oral tablet, disintegratin tab(s) orally every 8 hours, As Needed -for nausea   · 	Cipro 250 mg oral tablet: 1 tab(s) orally 2 times a day   · 	albuterol CFC free 90 mcg/inh inhalation aerosol: 2 puff(s) inhaled every 6 hours, As needed, Shortness of Breath    Patient History:    Past Medical, Past Surgical, and Family History:  PAST MEDICAL HISTORY:  Anemia     Asthma     Bladder cancer     HLD (hyperlipidemia)     HTN (hypertension)     Hydronephrosis     Liver cyst     Pacemaker     Parathyroid tumor.     PAST SURGICAL HISTORY:  H/O myomectomy.     FAMILY HISTORY:  Father  Still living? Unknown  Family history of atrial fibrillation, Age at diagnosis: Age Unknown  Family history of CHF (congestive heart failure), Age at diagnosis: Age Unknown  Family history of prostate cancer, Age at diagnosis: Age Unknown.     Tobacco Screening:  · Core Measure Site	Yes  · Has the patient used tobacco in the past 30 days?	No    Risk Assessment:    Present on Admission:  Deep Venous Thrombosis	no  Pulmonary Embolus	no     Heart Failure:  Does this patient have a history of or has been diagnosed with heart failure? yes.     LV Function Assessment (LVS function was evaluated before arrival and/or during hospitalization) no.        MEDICATIONS  (STANDING):  cefepime   IVPB 1000 milliGRAM(s) IV Intermittent every 8 hours  enoxaparin Injectable 40 milliGRAM(s) SubCutaneous daily    MEDICATIONS  (PRN):      Vital Signs Last 24 Hrs  T(C): 36.5 (2021 04:55), Max: 36.8 (2021 15:48)  T(F): 97.7 (2021 04:55), Max: 98.2 (2021 15:48)  HR: 53 (2021 04:55) (50 - 69)  BP: 138/75 (2021 04:55) (130/74 - 162/70)  BP(mean): --  RR: 17 (2021 04:55) (16 - 18)  SpO2: 100% (2021 04:55) (98% - 100%)      LABS:                        13.6   4.52  )-----------( 214      ( 2021 08:18 )             41.2     07-20    139  |  104  |  24<H>  ----------------------------<  73  3.9   |  25  |  1.10    Ca    9.9      2021 08:18  Phos  3.3     07-20  Mg     2.3     07-20    TPro  7.8  /  Alb  3.8  /  TBili  0.6  /  DBili  x   /  AST  18  /  ALT  16  /  AlkPhos  93  07-20      CARDIAC MARKERS ( 2021 08:18 )  <0.015 ng/mL / x     / x     / x     / x      CARDIAC MARKERS ( 2021 15:18 )  <0.015 ng/mL / x     / x     / x     / x          Urinalysis Basic - ( 2021 16:19 )    Color: Yellow / Appearance: very cloudy / S.015 / pH: x  Gluc: x / Ketone: Negative  / Bili: Negative / Urobili: Negative   Blood: x / Protein: 500 mg/dL / Nitrite: Negative   Leuk Esterase: Small / RBC: 25-50 /HPF / WBC 26-50 /HPF   Sq Epi: x / Non Sq Epi: Few /HPF / Bacteria: Moderate /HPF        COVID-19 PCR: NotDetec (2021 19:33)      RADIOLOGY & ADDITIONAL TESTS:     History of Present Illness:   Pt is a 55 year old female with PMHx of hypertension,  pacemaker for sick sinus syndrome ([place], ) bladder masses, questionable bladder cancer,  left-sided hydronephrosis and Rt PCN  presents to the ED with complaints of left-sided chest pain and left flank pain x2 weeks. Patient states that she has been feeling left-sided aching chest pain wrapping around to her flank and down the left side of her abdomen.   Patient reports that she has history of similar symptoms in the past.  Patient states that she was recently diagnosed with a UTI last month, and states that she was treated for pyelonephritis and completed her full course of antibiotics. She also endorses that her PCN tube isnot draining very well but it drains better when flushing it.    Patient otherwise denies any fever, chills, shortness of breath, cough, hemoptysis, and all other acute complaints.       REVIEW OF SYSTEMS:    CONSTITUTIONAL: No fever, no loss of appetite. no chills, no weight loss, +20lb wt gain   EYES: no acute visual disturbances  NECK: No pain or stiffness  RESPIRATORY: No cough; No shortness of breath  CARDIOVASCULAR: No chest pain, no palpitations  GASTROINTESTINAL: left lateral pain radiating to left flank. No nausea or vomiting; No diarrhea   NEUROLOGICAL: No headache or numbness, no tremors  MUSCULOSKELETAL: No joint pain, no muscle pain  GENITOURINARY: no dysuria, no frequency, no hesitancy  PSYCHIATRY: no depression, no anxiety  ALL OTHER  ROS negative        Allergies and Intolerances:        Allergies:  	No Known Allergies:     Home Medications:   * Patient Currently Takes Medications as of 2020 12:53 documented in Structured Notes  · 	ondansetron 4 mg oral tablet, disintegratin tab(s) orally every 8 hours, As Needed -for nausea   · 	Cipro 250 mg oral tablet: 1 tab(s) orally 2 times a day   · 	albuterol CFC free 90 mcg/inh inhalation aerosol: 2 puff(s) inhaled every 6 hours, As needed, Shortness of Breath    Patient History:    Past Medical, Past Surgical, and Family History:  PAST MEDICAL HISTORY:  Anemia     Asthma     Bladder cancer     HLD (hyperlipidemia)     HTN (hypertension)     Hydronephrosis     Liver cyst     Pacemaker     Parathyroid tumor.     PAST SURGICAL HISTORY:  H/O myomectomy.     FAMILY HISTORY:  Father  Still living? Unknown  Family history of atrial fibrillation, Age at diagnosis: Age Unknown  Family history of CHF (congestive heart failure), Age at diagnosis: Age Unknown  Family history of prostate cancer, Age at diagnosis: Age Unknown.     Tobacco Screening:  · Core Measure Site	Yes  · Has the patient used tobacco in the past 30 days?	No    Risk Assessment:    Present on Admission:  Deep Venous Thrombosis	no  Pulmonary Embolus	no     Heart Failure:  Does this patient have a history of or has been diagnosed with heart failure? yes.     LV Function Assessment (LVS function was evaluated before arrival and/or during hospitalization) no.        MEDICATIONS  (STANDING):  cefepime   IVPB 1000 milliGRAM(s) IV Intermittent every 8 hours  enoxaparin Injectable 40 milliGRAM(s) SubCutaneous daily    MEDICATIONS  (PRN):      Vital Signs Last 24 Hrs  T(C): 36.5 (2021 04:55), Max: 36.8 (2021 15:48)  T(F): 97.7 (2021 04:55), Max: 98.2 (2021 15:48)  HR: 53 (2021 04:55) (50 - 69)  BP: 138/75 (2021 04:55) (130/74 - 162/70)  BP(mean): --  RR: 17 (2021 04:55) (16 - 18)  SpO2: 100% (2021 04:55) (98% - 100%)    GEN: NAD; A and O x 3  LUNGS: CTA B/L  HEART: S1 S2, left CW pacemaker  ABDOMEN: soft, non-tender, non-distended, + BS, right NT  EXTREMITIES: no edema  NERVOUS SYSTEM:  Awake and alert; no focal neuro deficits    LABS:                        13.6   4.52  )-----------( 214      ( 2021 08:18 )             41.2     07-20    139  |  104  |  24<H>  ----------------------------<  73  3.9   |  25  |  1.10    Ca    9.9      2021 08:18  Phos  3.3     07-20  Mg     2.3     07-20    TPro  7.8  /  Alb  3.8  /  TBili  0.6  /  DBili  x   /  AST  18  /  ALT  16  /  AlkPhos  93  07-20      CARDIAC MARKERS ( 2021 08:18 )  <0.015 ng/mL / x     / x     / x     / x      CARDIAC MARKERS ( 2021 15:18 )  <0.015 ng/mL / x     / x     / x     / x          Urinalysis Basic - ( 2021 16:19 )    Color: Yellow / Appearance: very cloudy / S.015 / pH: x  Gluc: x / Ketone: Negative  / Bili: Negative / Urobili: Negative   Blood: x / Protein: 500 mg/dL / Nitrite: Negative   Leuk Esterase: Small / RBC: 25-50 /HPF / WBC 26-50 /HPF   Sq Epi: x / Non Sq Epi: Few /HPF / Bacteria: Moderate /HPF        COVID-19 PCR: NotDetec (2021 19:33)      RADIOLOGY & ADDITIONAL TESTS:  < from: CT Abdomen and Pelvis No Cont (21 @ 17:18) >  EXAM:  CT ABDOMEN AND PELVIS                            PROCEDURE DATE:  2021          INTERPRETATION:  CLINICAL INFORMATION: Left flank pain. Antecedent history of bladder cancer    COMPARISON: 2019    CONTRAST/COMPLICATIONS:  IV Contrast: NONE  Oral Contrast: NONE  Complications: None reported at time of study completion    PROCEDURE:  CT of the Abdomen and Pelvis was performed.  Sagittal and coronal reformats were performed.  Evaluation solid organ parenchyma and urinary tract significantly limited by lack of IV contrast.  FINDINGS:  LOWER CHEST: In situ cardiac pacer. Clear lung bases..    LIVER: Within normal limits.  BILE DUCTS: Normal caliber.  GALLBLADDER: Within normal limits.  SPLEEN: Within normal limits.  PANCREAS: Within normal limits.  ADRENALS: Within normal limits.  KIDNEYS/URETERS: Severe end-stage chronic left hydroureteronephrosis with marked thinning of the cortical mantle, worsened compared to prior. No obstructing calculus. Right nephrostomy catheter in situ. No hydronephrosis or urolithiasis on the right.    BLADDER: Extensive poorly defined lobulated soft tissue in the bladder consistent with the history of bladder neoplasm. Associated hematoma difficult to exclude.  REPRODUCTIVE ORGANS: No gynecologic mass    BOWEL: No bowel obstruction. Appendix no appendicitis  PERITONEUM: No ascites.  VESSELS: Nonaneurysmal.  RETROPERITONEUM/LYMPH NODES: No lymphadenopathy.  ABDOMINAL WALL: Within normal limits.  BONES: No acute or aggressive pathology.    IMPRESSION:  Significantly limited by lack of IV contrast.  Severe end-stage chronic left hydroureteronephrosis without obstructing calculus presumably related to bladder neoplasm.  Poorly defined bladder soft tissue density consistent with afforded history of bladder neoplasm.      < end of copied text >

## 2021-07-20 NOTE — PROGRESS NOTE ADULT - PROBLEM SELECTOR PLAN 5
----- Message from Neelam David MD sent at 6/15/2017  1:36 PM EDT -----  Please let the patient know that her nuclear stress test was low risk. She is low risk from a cardiac standpoint for her back surgery  ----- Message -----     From: Lani Aldana RN     Sent: 6/6/2017   7:54 AM       To: Neelam David MD    Per your last note \"    Abnormal EKG. Patient does have a chronic right bundle branch block with left intravesicular block and abnormal ST-T changes. Her functional status is very poor because of her chronic lung disease.  I have recommended a pharmacologic nuclear stress is for further risk stratification prior to undergoing her back surgery.    - patient has past history of pace maker not on any meds - patient has pacemaker not on any meds

## 2021-07-20 NOTE — CONSULT NOTE ADULT - ATTENDING COMMENTS
# Bladder cancer:   s/p 3 cycles of [place]+Cis as per patient at [facility] (was supposed to have 12 treatments). Stopped treatment due to Pyelonephritis and no f/u thereafter at [facility] as she lost trust. Now following with Urologist at [facility].   Discussed diagnosis, prognosis and treatment options.   Will obtain report form [facility] including Pathology, Chemo regimen, PET scan, etc. Will try to connect with previous providers.   Further recs pending above.

## 2021-07-20 NOTE — PROGRESS NOTE ADULT - PROBLEM SELECTOR PLAN 3
- Patient c/o left chest pain with left flank pain   - EKG showing sinus bradycardia  - Dr Rosario consulted- pt had a recent echo and stress test in Dr Rosario office was negative  - Trop #1- neg, f/u trop 2 and D Dimer at  midnight  - no further cardiac work up needed - Patient c/o left chest pain with left flank pain   - EKG showing sinus bradycardia, Lipid panel -chol 243,  (elevated)-->may consider starting Statins  - Appreciate Dr Marlene lopez - pt had a recent Echo and Stress test in the office, was negative  - Trop #1- neg, trop 2 -neg and D Dimer- 170 wnl  - no further cardiac work up needed

## 2021-07-20 NOTE — PROGRESS NOTE ADULT - ASSESSMENT
Patient is a 55y old  Female with PMHx of hypertension, bladder masses, questionable bladder cancer, and left-sided hydronephrosis presents to the ER for evaluation of left-sided chest pain and left flank pain x2 weeks. Patient states that she has been feeling left-sided aching chest pain wrapping around to her flank and down the left side of her abdomen. Patient reports that she has history of similar symptoms in the past.  Sterling was recently diagnosed with a UTI last month, and states that she was treated for pyelonephritis and completed her full course of antibiotics. ON admission, she has no fever or Leukocytosis but positive urine analysis. The CT abd/pelvis shows Severe end-stage chronic left hydroureteronephrosis without obstructing calculus presumably related to bladder neoplasm. She has started on Ceftriaxone and the ID consult requested to assist with further evaluation and antibiotic management.        #Complicated  UTI  - in the setting of Right PCN  # Chronic left hydroureteronephrosis without obstructing calculus   # Bladder cancer    would recommend:    1. Follow up urine culture  2. Please flush the Right PCN since not draining well,   3. IF cannot be flushed, please contact IR for possible tube exchange as per Urology  4. Change Ceftriaxone to Cefepime until work up is done  5. Monitor  kidney function   6. Pain management as needed     d/w patient and Nursing staff    Attending Attestation:    Spent more than 45 minutes on total encounter, more than 50 % of the visit was spent counseling and/or coordinating care by the Attending physician. Patient is a 55y old  Female with PMHx of hypertension, bladder masses, questionable bladder cancer, and left-sided hydronephrosis presents to the ER for evaluation of left-sided chest pain and left flank pain x2 weeks. Patient states that she has been feeling left-sided aching chest pain wrapping around to her flank and down the left side of her abdomen. Patient reports that she has history of similar symptoms in the past.  Sterling was recently diagnosed with a UTI last month, and states that she was treated for pyelonephritis and completed her full course of antibiotics. ON admission, she has no fever or Leukocytosis but positive urine analysis. The CT abd/pelvis shows Severe end-stage chronic left hydroureteronephrosis without obstructing calculus presumably related to bladder neoplasm. She has started on Ceftriaxone and the ID consult requested to assist with further evaluation and antibiotic management.        #Complicated  UTI  - in the setting of Right PCN  # Chronic left hydroureteronephrosis without obstructing calculus   # Bladder cancer    would recommend:    1. Follow up urine culture, is in process   2. Please flush the Right PCN since not draining well,   3. IF cannot be flushed, please contact IR for possible tube exchange as per Urology  4.. Continue Cefepime until work up is done  5. Monitor  kidney function   6. Pain management as needed     d/w patient and Nursing staff    Attending Attestation:    Spent more than 45 minutes on total encounter, more than 50 % of the visit was spent counseling and/or coordinating care by the Attending physician.

## 2021-07-20 NOTE — PROGRESS NOTE ADULT - SUBJECTIVE AND OBJECTIVE BOX
Patient is a 55y old  Female who presents with a chief complaint of     SUBJECTIVE / OVERNIGHT EVENTS:    MEDICATIONS  (STANDING):  cefTRIAXone   IVPB 1000 milliGRAM(s) IV Intermittent every 24 hours  enoxaparin Injectable 40 milliGRAM(s) SubCutaneous daily    MEDICATIONS  (PRN):        CAPILLARY BLOOD GLUCOSE        I&O's Summary      PHYSICAL EXAM:  GENERAL: NAD, well-developed  HEAD:  Atraumatic, Normocephalic  EYES: EOMI, PERRLA, conjunctiva and sclera clear  NECK: Supple, No JVD  CHEST/LUNG: Clear to auscultation bilaterally; No wheeze  HEART: Regular rate and rhythm; No murmurs, rubs, or gallops  ABDOMEN: Soft, Nontender, Nondistended; Bowel sounds present  EXTREMITIES:  2+ Peripheral Pulses, No clubbing, cyanosis, or edema  PSYCH: AAOx3  NEUROLOGY: non-focal  SKIN: No rashes or lesions    LABS:                        13.4   5.23  )-----------( 207      ( 2021 15:18 )             40.8     07-19    137  |  105  |  25<H>  ----------------------------<  99  4.1   |  27  |  1.17    Ca    9.4      2021 15:18    TPro  7.6  /  Alb  3.6  /  TBili  0.4  /  DBili  x   /  AST  25  /  ALT  17  /  AlkPhos  91  07-19      CARDIAC MARKERS ( 2021 15:18 )  <0.015 ng/mL / x     / x     / x     / x          Urinalysis Basic - ( 2021 16:19 )    Color: Yellow / Appearance: very cloudy / S.015 / pH: x  Gluc: x / Ketone: Negative  / Bili: Negative / Urobili: Negative   Blood: x / Protein: 500 mg/dL / Nitrite: Negative   Leuk Esterase: Small / RBC: 25-50 /HPF / WBC 26-50 /HPF   Sq Epi: x / Non Sq Epi: Few /HPF / Bacteria: Moderate /HPF        RADIOLOGY & ADDITIONAL TESTS:    Imaging Personally Reviewed:    Consultant(s) Notes Reviewed:      Care Discussed with Consultants/Other Providers:   Patient is a 55y old  Female who presents with a chief complaint of     SUBJECTIVE / OVERNIGHT EVENTS: No acute overnight events. Pt states that her right NT not draining properly and she was awake at night flushing her NT. Per pt NT was exchanged in the beginning of  at [facility]  Pt reports poor appetite weight loss of 80Lbs since last year    MEDICATIONS  (STANDING):  cefTRIAXone   IVPB 1000 milliGRAM(s) IV Intermittent every 24 hours  enoxaparin Injectable 40 milliGRAM(s) SubCutaneous daily      CAPILLARY BLOOD GLUCOSE        I&O's Summary      PHYSICAL EXAM:  GENERAL: NAD, cachectic   HEAD:  Atraumatic, Normocephalic  EYES: EOMI, PERRLA, conjunctiva and sclera clear  NECK: Supple, No JVD  CHEST/LUNG: Clear to auscultation bilaterally; No wheeze  HEART: Regular rate and rhythm; No murmurs, rubs, or gallops, S1, S2 present  ABDOMEN: Soft, Nontender, Nondistended; Bowel sounds present, right NT in place draing yellow urine  EXTREMITIES:  2+ Peripheral Pulses, No clubbing, cyanosis, or edema  PSYCH: AAOx3  NEUROLOGY: non-focal  SKIN: No rashes or lesions    LABS:                        13.4   5.23  )-----------( 207      ( 2021 15:18 )             40.8     07-19    137  |  105  |  25<H>  ----------------------------<  99  4.1   |  27  |  1.17    Ca    9.4      2021 15:18    TPro  7.6  /  Alb  3.6  /  TBili  0.4  /  DBili  x   /  AST  25  /  ALT  17  /  AlkPhos  91  07-19      CARDIAC MARKERS ( 2021 15:18 )  <0.015 ng/mL / x     / x     / x     / x          Urinalysis Basic - ( 2021 16:19 )    Color: Yellow / Appearance: very cloudy / S.015 / pH: x  Gluc: x / Ketone: Negative  / Bili: Negative / Urobili: Negative   Blood: x / Protein: 500 mg/dL / Nitrite: Negative   Leuk Esterase: Small / RBC: 25-50 /HPF / WBC 26-50 /HPF   Sq Epi: x / Non Sq Epi: Few /HPF / Bacteria: Moderate /HPF        RADIOLOGY & ADDITIONAL TESTS:    Imaging Personally Reviewed:    Consultant(s) Notes Reviewed:      Care Discussed with Consultants/Other Providers:

## 2021-07-20 NOTE — PROGRESS NOTE ADULT - PROBLEM SELECTOR PLAN 8
RISK                                                          Points  [] Previous VTE                                           3  [] Thrombophilia                                        2  [] Lower limb paralysis                              2   [x] Current Cancer                                       2   [] Immobilization > 24 hrs                        1  [] ICU/CCU stay > 24 hours                       1  [] Age > 60                                                   1    on lovenox - on Lovenox  - Weight loss / Cachexic - Nutrition consult placed

## 2021-07-20 NOTE — PROGRESS NOTE ADULT - SUBJECTIVE AND OBJECTIVE BOX
CARDIOLOGY     PROGRESS  NOTE   ________________________________________________    CHIEF COMPLAINT:Patient is a 55y old  Female who presents with a chief complaint of   doing better.  	  REVIEW OF SYSTEMS:  CONSTITUTIONAL: No fever, weight loss, or fatigue  EYES: No eye pain, visual disturbances, or discharge  ENT:  No difficulty hearing, tinnitus, vertigo; No sinus or throat pain  NECK: No pain or stiffness  RESPIRATORY: No cough, wheezing, chills or hemoptysis; No Shortness of Breath  CARDIOVASCULAR: No chest pain, palpitations, passing out, dizziness, or leg swelling  GASTROINTESTINAL: No abdominal or epigastric pain. No nausea, vomiting, or hematemesis; No diarrhea or constipation. No melena or hematochezia.  GENITOURINARY: No dysuria, frequency, hematuria, or incontinence  NEUROLOGICAL: No headaches, memory loss, loss of strength, numbness, or tremors  SKIN: No itching, burning, rashes, or lesions   LYMPH Nodes: No enlarged glands  ENDOCRINE: No heat or cold intolerance; No hair loss  MUSCULOSKELETAL: No joint pain or swelling; No muscle, back, or extremity pain  PSYCHIATRIC: No depression, anxiety, mood swings, or difficulty sleeping  HEME/LYMPH: No easy bruising, or bleeding gums  ALLERGY AND IMMUNOLOGIC: No hives or eczema	    [ ] All others negative	  [ ] Unable to obtain    PHYSICAL EXAM:  T(C): 36.6 (07-20-21 @ 12:59), Max: 36.6 (07-20-21 @ 12:59)  HR: 58 (07-20-21 @ 12:59) (50 - 58)  BP: 139/88 (07-20-21 @ 12:59) (130/74 - 162/70)  RR: 17 (07-20-21 @ 12:59) (17 - 18)  SpO2: 100% (07-20-21 @ 12:59) (100% - 100%)  Wt(kg): --  I&O's Summary      Appearance: Normal	  HEENT:   Normal oral mucosa, PERRL, EOMI	  Lymphatic: No lymphadenopathy  Cardiovascular: Normal S1 S2, No JVD, + murmurs, No edema  Respiratory: Lungs clear to auscultation	  Psychiatry: A & O x 3, Mood & affect appropriate  Gastrointestinal:  Soft, Non-tender, + BS	  Skin: No rashes, No ecchymoses, No cyanosis	  Neurologic: Non-focal  Extremities: Normal range of motion, No clubbing, cyanosis or edema  Vascular: Peripheral pulses palpable 2+ bilaterally    MEDICATIONS  (STANDING):  cefepime   IVPB 1000 milliGRAM(s) IV Intermittent every 8 hours  enoxaparin Injectable 40 milliGRAM(s) SubCutaneous daily  polyethylene glycol 3350 17 Gram(s) Oral daily  senna 2 Tablet(s) Oral at bedtime      TELEMETRY: 	    ECG:  	  RADIOLOGY:  OTHER: 	  	  LABS:	 	    CARDIAC MARKERS:  CARDIAC MARKERS ( 20 Jul 2021 08:18 )  <0.015 ng/mL / x     / x     / x     / x      CARDIAC MARKERS ( 19 Jul 2021 15:18 )  <0.015 ng/mL / x     / x     / x     / x                                    13.6   4.52  )-----------( 214      ( 20 Jul 2021 08:18 )             41.2     07-20    139  |  104  |  24<H>  ----------------------------<  73  3.9   |  25  |  1.10    Ca    9.9      20 Jul 2021 08:18  Phos  3.3     07-20  Mg     2.3     07-20    TPro  7.8  /  Alb  3.8  /  TBili  0.6  /  DBili  x   /  AST  18  /  ALT  16  /  AlkPhos  93  07-20    proBNP:   Lipid Profile: Cholesterol 243  LDL --  HDL 74  TG 71    HgA1c:   TSH: Thyroid Stimulating Hormone, Serum: 0.65 uU/mL (07-20 @ 08:18)    Culture - Urine (06.16.20 @ 18:15)    -  Levofloxacin: S <=2    -  Meropenem: S <=1    -  Nitrofurantoin: S <=32 Should not be used to treat pyelonephritis    -  Piperacillin/Tazobactam: S <=16    -  Tigecycline: S <=2    -  Tobramycin: S <=4    -  Trimethoprim/Sulfamethoxazole: S <=2/38    -  Amikacin: S <=16    -  Ampicillin: R <=8 These ampicillin results predict results for amoxicillin    -  Ampicillin/Sulbactam: R <=8/4 Enterobacter, Citrobacter, and Serratia may develop resistance during prolonged therapy (3-4 days)    -  Aztreonam: S <=4    -  Cefazolin: S 16    -  Cefepime: S <=4    -  Cefoxitin: R 16    -  Ceftriaxone: S <=1 Enterobacter, Citrobacter, and Serratia may develop resistance during prolonged therapy    -  Ciprofloxacin: S <=1    -  Ertapenem: S <=1    -  Gentamicin: S <=4    -  Imipenem: S <=1    Specimen Source: .Urine Clean Catch (Midstream)    Culture Results:   50,000 - 99,000 CFU/mL Enterobacter aerogenes    Organism Identification: Enterobacter aerogenes    Organism: Enterobacter aerogenes    Method Type: PATRICIA      < from: CT Angio Chest w/ IV Cont (04.17.20 @ 16:16) >  No pulmonary embolism.  Clear lungs.  Stable appearance of left hydronephrosis, partially imaged. Enlarged left retroperitoneal nodes are new since available prior imaging. Dedicated abdominal imaging is recommended given patient's history of left urothelial cancer.        1. Follow up urine culture  2. Please flush the Right PCN since not draining well,   3. IF cannot be flushed, please contact IR for possible tube exchange as per Urology  4. Change Ceftriaxone to Cefepime until work up is done  5. Monitor  kidney function   6. Pain management as needed   Assessment and plan  ---------------------------  55 year old female with PMHx of hypertension, bladder masses, questionable bladder cancer, and left-sided hydronephrosis presents to the ED with complaints of left-sided chest pain and left flank pain x2 weeks. Patient states that she has been feeling left-sided aching chest pain wrapping around to her flank and down the left side of her abdomen. Patient reports that she has history of similar symptoms in the past. Patient additionally endorses some hematuria. Patient states that she was recently diagnosed with a UTI last month, and states that she was treated for pyelonephritis and completed her full course of antibiotics. Patient otherwise denies any fever, chills, shortness of breath, cough, hemoptysis, and all other acute complaints  pt is a 56 yo well known to me with hx of bradycardia/ sss, s/p ppm now  with chest pain.  check cardiac enzymes  tsh  check d dimer r/o PE  pt had a recent echo and stress test in my office was negative, will review  office records checked normal echo and stress test  continue abx as per ID  ,fu cultures

## 2021-07-20 NOTE — PROGRESS NOTE ADULT - ASSESSMENT
_________________________________________________________________________________________  ========>>  M E D I C A L   A T T E N D I N G    F O L L O W  U P  N O T E  <<=========  -----------------------------------------------------------------------------------------------------    - Patient seen and examined by me earlier today.   - In summary,  ZOË HOUSE is a 55y year old woman admitted with Lft flank pain   - Patient today overall doing ok, comfortable, eating fairly     ==================>> REVIEW OF SYSTEM <<=================    GEN: no fever, no chills, pain in left  flank on and off   RESP: no SOB, no cough, no sputum  CVS: no chest pain, no palpitations, no edema  GI: no abdominal pain, no nausea, no constipation, no diarrhea  : no dysuria, no frequency, no hematuria in bag but had some hematuria in voided urine before..       pt also reports problems with drainage of  nephrostomy at home, requiring daily flushes   Neuro: no headache, no dizziness  Derm : no itching, no rash    ==================>> PHYSICAL EXAM <<=================    GEN: A&O X 3 , NAD , comfortable, pleasant, calm , cachectic   HEENT: NCAT, PERRL, MMM, hearing intact  Neck: supple , no JVD appreciated  CVS: S1S2 , regular , No M/R/G appreciated  PULM: CTA B/L,  no W/R/R appreciated  ABD.: soft. non tender, non distended,  bowel sounds present  Extrem: intact pulses , no edema     nephrostomy to bag with yellow urine   PSYCH : normal mood,  not anxious                            ( Note Written / Date of service :  21 )    ==================>> MEDICATIONS <<====================    MEDICATIONS  (STANDING):  cefepime   IVPB 1000 milliGRAM(s) IV Intermittent every 8 hours  enoxaparin Injectable 40 milliGRAM(s) SubCutaneous daily  ___________  Active diet:  Diet, Regular:   DASH/TLC Sodium & Cholesterol Restricted  ___________________    ==================>> VITAL SIGNS <<==================    T(C): 36.6 (21 @ 12:59), Max: 36.8 (21 @ 15:48)  HR: 58 (21 @ 12:59) (50 - 60)  BP: 139/88 (21 @ 12:59) (130/74 - 162/70)  RR: 17 (21 @ 12:59) (17 - 18)  SpO2: 100% (21 @ 12:59) (98% - 100%)      ==================>> LAB AND IMAGING <<==================                        13.6   4.52  )-----------( 214      ( 2021 08:18 )             41.2            139  |  104  |  24<H>  ----------------------------<  73  3.9   |  25  |  1.10    Ca    9.9      2021 08:18  Phos  3.3     -  Mg     2.3         TPro  7.8  /  Alb  3.8  /  TBili  0.6  /  DBili  x   /  AST  18  /  ALT  16  /  AlkPhos  93  -              CARDIAC MARKERS ( 2021 08:18 )  <0.015 ng/mL / x     / x     / x     / x      CARDIAC MARKERS ( 2021 15:18 )  <0.015 ng/mL / x     / x     / x     / x         Urinalysis Basic - ( 2021 16:19 )  Color: Yellow / Appearance: very cloudy / S.015 / pH: x  Gluc: x / Ketone: Negative  / Bili: Negative / Urobili: Negative   Blood: x / Protein: 500 mg/dL / Nitrite: Negative   Leuk Esterase: Small / RBC: 25-50 /HPF / WBC 26-50 /HPF   Sq Epi: x / Non Sq Epi: Few /HPF / Bacteria: Moderate /HPF    TSH:      0.65   (21)           Lipid profile:  (21)     Total: 243     LDL  : (p)     HDL  :74     TG   :71     HgA1C:   (21)          (21)      5.5    _______________________  C U L T U R E S :    pending//   COVID-19 PCR: NotDetelili (21 @ 19:33)    ___________________________________________________________________________________  ===============>>  A S S E S S M E N T   A N D   P L A N <<===============  ------------------------------------------------------------------------------------------    · Assessment	  55 year old female with PMHx of hypertension,  pacemaker for sick sinus syndrome ([place], ) bladder masses, questionable bladder cancer,  left-sided hydronephrosis and Rt PCN  presents to the ED with complaints of left-sided chest pain and left flank pain x2 weeks. Pt follows with Dr. Trinidad. Per  while patient was initially thought to have T1 disease in bladder, appearance on recent imaging may suggest T3. However, recently had PET on 21 demonstrating new FDG-avid left para-aortic LN in RP space, consistent w/ possible met    Problem/Plan - 1:  ·  Problem: Pyelonephritis.  Plan: - Patient presented with left flank pain.   - CT abdomen showing Severe end-stage chronic left hydroureteronephrosis without obstructing calculus presumably related to bladder neoplasm.  Poorly defined bladder soft tissue density consistent with afforded history of bladder neoplasm.  - UA weakly positive   - Patient afebrile, no WBCs .  - Appreciate Urology recs- As Cr is stable and she does not appear to have an obstructive pyelonephritis, no acute intervention is needed ! >> need close follow up by urology   - Appreciate ID Dr Jennifer lopez- changed Rocephin to Cefepime.   - follow cultures from urine and nephrostomy    Problem/Plan - 2:  ·  Problem: Nephrostomy status.  Plan: - Patient has past history of bladder mass.   - CT abdomen showing Severe end-stage chronic left hydroureteronephrosis without obstructing calculus presumably related to bladder neoplasm. Poorly defined bladder soft tissue density consistent with afforded history of bladder neoplasm.  - Appreciate Urology recs - Continue w/ Rt PCN. If Rt PCN begins to drain poorly/cannot be flushed, please contact IR for possible tube exchange  - Patient should be instructed to f/u with Dr. Trinidad at d/c to discuss new PET findings and further options for management  - IR eval for tube check / change     Problem/Plan - 3:  ·  Problem: Chest pain.  Plan: - Patient c/o left chest pain with left flank pain   - EKG showing sinus bradycardia  - Dr Rosario consulted- pt had a recent echo and stress test in Dr Rosario office was negative  - less likely cardiac as above     D-Dimer Assay, Quantitative: 170 ng/mL DDU (21 @ 08:18)  - no further cardiac work up needed.     Problem/Plan - 4:  ·  Problem: Bladder mass.  Plan: Patient has past history of bladder mass.   - CT abdomen showing Severe end-stage chronic left hydroureteronephrosis without obstructing calculus presumably related to bladder neoplasm.  Poorly defined bladder soft tissue density consistent with afforded history of bladder neoplasm.  -urology consulted .  - [facility] group consulted ( Zoë).     Problem/Plan - 5:  ·  Problem: HTN   - Patient has past  hsitory of HTN controlled on diet.    Problem/Plan - 6:  ·  Problem: Asthma.    - Patient has past history of asthma not on any meds and well controlled     GI/ DVT prophylaxis   --------------------------------------------  Case discussed with pt  Education given on findings and plan of care  ___________________________  H. NIEVES Ochoa.  Pager: [phone]

## 2021-07-20 NOTE — PROGRESS NOTE ADULT - ASSESSMENT
55 year old female with PMHx of hypertension,  pacemaker for sick sinus syndrome ([place], ) bladder masses, questionable bladder cancer,  left-sided hydronephrosis and Rt PCN  presents to the ED with complaints of left-sided chest pain and left flank pain x2 weeks. Pt follows with Dr. Trinidad. Per  while patient was initially thought to have T1 disease in bladder, appearance on recent imaging may suggest T3. However, recently had PET on 7/16/21 demonstrating new FDG-avid left para-aortic LN in RP space, consistent w/ possible met 55 year old female with PMHx of hypertension,  pacemaker for sick sinus syndrome ([place], ) bladder masses, questionable bladder cancer,  left-sided hydronephrosis and Rt PCN  presents to the ED with complaints of left-sided chest pain and left flank pain x2 weeks. Pt follows with Dr. Trinidad. Per  while patient was initially thought to have T1 disease in bladder, appearance on recent imaging may suggest T3. However, recently had PET on 7/16/21 demonstrating new FDG-avid left para-aortic LN in RP space, consistent w/ possible met. Awaiting Urine culture results from NT and clear catch.

## 2021-07-20 NOTE — PROGRESS NOTE ADULT - PROBLEM SELECTOR PLAN 4
Patient has past history of bladder mass.   - CT abdomen showing Severe end-stage chronic left hydroureteronephrosis without obstructing calculus presumably related to bladder neoplasm.  Poorly defined bladder soft tissue density consistent with afforded history of bladder neoplasm.  -urology consulted .  - [facility] group consulted ( Zoë) Patient has past history of bladder mass.   - CT abdomen showing Severe end-stage chronic left hydroureteronephrosis without obstructing calculus presumably related to bladder neoplasm. Poorly defined bladder soft tissue density consistent with afforded history of bladder neoplasm.  - Appreciate Urology recs - pending IR to serafin BAUER & possible tube exchange  - [facility] group consulted ( Zoë)- team Left message for Dr Trinidad to obtain more details about Dx/path, staging, tx etc  - pt has not followed up with [facility] and would like to f/u in the office for Medical Oncology. Prognosis/Tx pending addt'l information

## 2021-07-21 DIAGNOSIS — Z71.89 OTHER SPECIFIED COUNSELING: ICD-10-CM

## 2021-07-21 DIAGNOSIS — Z02.9 ENCOUNTER FOR ADMINISTRATIVE EXAMINATIONS, UNSPECIFIED: ICD-10-CM

## 2021-07-21 LAB
ANION GAP SERPL CALC-SCNC: 7 MMOL/L — SIGNIFICANT CHANGE UP (ref 5–17)
BUN SERPL-MCNC: 35 MG/DL — HIGH (ref 7–18)
CALCIUM SERPL-MCNC: 10.1 MG/DL — SIGNIFICANT CHANGE UP (ref 8.4–10.5)
CHLORIDE SERPL-SCNC: 102 MMOL/L — SIGNIFICANT CHANGE UP (ref 96–108)
CO2 SERPL-SCNC: 28 MMOL/L — SIGNIFICANT CHANGE UP (ref 22–31)
CREAT SERPL-MCNC: 1.29 MG/DL — SIGNIFICANT CHANGE UP (ref 0.5–1.3)
GLUCOSE BLDC GLUCOMTR-MCNC: 105 MG/DL — HIGH (ref 70–99)
GLUCOSE SERPL-MCNC: 82 MG/DL — SIGNIFICANT CHANGE UP (ref 70–99)
HCT VFR BLD CALC: 44.4 % — SIGNIFICANT CHANGE UP (ref 34.5–45)
HGB BLD-MCNC: 14.5 G/DL — SIGNIFICANT CHANGE UP (ref 11.5–15.5)
MCHC RBC-ENTMCNC: 30 PG — SIGNIFICANT CHANGE UP (ref 27–34)
MCHC RBC-ENTMCNC: 32.7 GM/DL — SIGNIFICANT CHANGE UP (ref 32–36)
MCV RBC AUTO: 91.9 FL — SIGNIFICANT CHANGE UP (ref 80–100)
NRBC # BLD: 0 /100 WBCS — SIGNIFICANT CHANGE UP (ref 0–0)
PLATELET # BLD AUTO: 211 K/UL — SIGNIFICANT CHANGE UP (ref 150–400)
POTASSIUM SERPL-MCNC: 3.9 MMOL/L — SIGNIFICANT CHANGE UP (ref 3.5–5.3)
POTASSIUM SERPL-SCNC: 3.9 MMOL/L — SIGNIFICANT CHANGE UP (ref 3.5–5.3)
RBC # BLD: 4.83 M/UL — SIGNIFICANT CHANGE UP (ref 3.8–5.2)
RBC # FLD: 12.8 % — SIGNIFICANT CHANGE UP (ref 10.3–14.5)
SODIUM SERPL-SCNC: 137 MMOL/L — SIGNIFICANT CHANGE UP (ref 135–145)
WBC # BLD: 4.51 K/UL — SIGNIFICANT CHANGE UP (ref 3.8–10.5)
WBC # FLD AUTO: 4.51 K/UL — SIGNIFICANT CHANGE UP (ref 3.8–10.5)

## 2021-07-21 RX ORDER — TRAMADOL HYDROCHLORIDE 50 MG/1
75 TABLET ORAL EVERY 6 HOURS
Refills: 0 | Status: DISCONTINUED | OUTPATIENT
Start: 2021-07-21 | End: 2021-07-27

## 2021-07-21 RX ORDER — CEFTRIAXONE 500 MG/1
1000 INJECTION, POWDER, FOR SOLUTION INTRAMUSCULAR; INTRAVENOUS ONCE
Refills: 0 | Status: COMPLETED | OUTPATIENT
Start: 2021-07-21 | End: 2021-07-21

## 2021-07-21 RX ORDER — CEFTRIAXONE 500 MG/1
1000 INJECTION, POWDER, FOR SOLUTION INTRAMUSCULAR; INTRAVENOUS EVERY 24 HOURS
Refills: 0 | Status: DISCONTINUED | OUTPATIENT
Start: 2021-07-22 | End: 2021-07-27

## 2021-07-21 RX ORDER — ACETAMINOPHEN 500 MG
650 TABLET ORAL EVERY 4 HOURS
Refills: 0 | Status: DISCONTINUED | OUTPATIENT
Start: 2021-07-21 | End: 2021-07-27

## 2021-07-21 RX ORDER — TRAMADOL HYDROCHLORIDE 50 MG/1
50 TABLET ORAL EVERY 6 HOURS
Refills: 0 | Status: DISCONTINUED | OUTPATIENT
Start: 2021-07-21 | End: 2021-07-27

## 2021-07-21 RX ORDER — CEFTRIAXONE 500 MG/1
INJECTION, POWDER, FOR SOLUTION INTRAMUSCULAR; INTRAVENOUS
Refills: 0 | Status: DISCONTINUED | OUTPATIENT
Start: 2021-07-21 | End: 2021-07-27

## 2021-07-21 RX ADMIN — Medication 650 MILLIGRAM(S): at 22:42

## 2021-07-21 RX ADMIN — CEFEPIME 100 MILLIGRAM(S): 1 INJECTION, POWDER, FOR SOLUTION INTRAMUSCULAR; INTRAVENOUS at 15:12

## 2021-07-21 RX ADMIN — CEFEPIME 100 MILLIGRAM(S): 1 INJECTION, POWDER, FOR SOLUTION INTRAMUSCULAR; INTRAVENOUS at 05:04

## 2021-07-21 NOTE — PROGRESS NOTE ADULT - PROBLEM SELECTOR PLAN 1
Patient presented with left flank pain. Patient afebrile, no WBCs  CT abdomen findings reviewed showing Severe end-stage chronic left hydroureteronephrosis without obstructing calculus presumably related to bladder neoplasm. Poorly defined bladder soft tissue density consistent with afforded history of bladder neoplasm  UA weakly positive, urine Cx  with no growth   Appreciate Urology recs- As Cr is stable and pt does not appear to have an obstructive pyelonephritis, no acute intervention is needed  Appreciate ID Dr Jennifer lopez- changed Rocephin to Cefepime 1g q8 at present D2

## 2021-07-21 NOTE — PROGRESS NOTE ADULT - PROBLEM SELECTOR PLAN 3
Patient c/o left chest pain with left flank pain   EKG showing sinus bradycardia, Lipid panel -chol 243,  (elevated)-->may consider starting Statins  Appreciate Dr Marlene lopez - pt had a recent Echo and Stress test in the office, was negative  Trop #1- neg, trop 2 -neg and D Dimer- 170 wnl  no further cardiac work up needed

## 2021-07-21 NOTE — PROGRESS NOTE ADULT - SUBJECTIVE AND OBJECTIVE BOX
NP Note discussed with  Primary Attending    Patient is a 55y old  Female who presents with a chief complaint of     INTERVAL HPI/OVERNIGHT EVENTS: no new complaints    MEDICATIONS  (STANDING):  cefepime   IVPB 1000 milliGRAM(s) IV Intermittent every 8 hours  enoxaparin Injectable 40 milliGRAM(s) SubCutaneous daily  polyethylene glycol 3350 17 Gram(s) Oral daily  senna 2 Tablet(s) Oral at bedtime    MEDICATIONS  (PRN):      __________________________________________________  REVIEW OF SYSTEMS:    CONSTITUTIONAL: No fever,   EYES: no acute visual disturbances  NECK: No pain or stiffness  RESPIRATORY: No cough; No shortness of breath  CARDIOVASCULAR: No chest pain, no palpitations  GASTROINTESTINAL: No pain. No nausea or vomiting; No diarrhea   NEUROLOGICAL: No headache or numbness, no tremors  MUSCULOSKELETAL: No joint pain,  Lt flank pain   GENITOURINARY: no dysuria, no frequency, no hesitancy  PSYCHIATRY: no depression , no anxiety  ALL OTHER  ROS negative        Vital Signs Last 24 Hrs  T(C): 36.6 (21 Jul 2021 11:38), Max: 36.6 (20 Jul 2021 20:29)  T(F): 97.8 (21 Jul 2021 11:38), Max: 97.8 (20 Jul 2021 20:29)  HR: 75 (21 Jul 2021 11:38) (5 - 75)  BP: 113/76 (21 Jul 2021 11:38) (113/76 - 127/72)  BP(mean): --  RR: 14 (21 Jul 2021 11:38) (14 - 16)  SpO2: 98% (21 Jul 2021 11:38) (98% - 100%)    ________________________________________________  PHYSICAL EXAM:  GENERAL: NAD  HEENT: Normocephalic;  conjunctivae and sclerae clear; moist mucous membranes;   NECK : supple  CHEST/LUNG: Clear to auscultation bilaterally with good air entry   HEART: S1 S2  regular; no murmurs, gallops or rubs  ABDOMEN: Soft, Nontender, Nondistended; Bowel sounds present + right percutaneous nephrostomy tube   EXTREMITIES: no cyanosis; no edema; no calf tenderness  SKIN: warm and dry; no rash  NERVOUS SYSTEM:  Awake and alert; Oriented  to place, person and time ; no new deficits    _________________________________________________  LABS:                        14.5   4.51  )-----------( 211      ( 21 Jul 2021 07:11 )             44.4     07-21    137  |  102  |  35<H>  ----------------------------<  82  3.9   |  28  |  1.29    Ca    10.1      21 Jul 2021 07:11  Phos  3.3     07-20  Mg     2.3     07-20    TPro  7.8  /  Alb  3.8  /  TBili  0.6  /  DBili  x   /  AST  18  /  ALT  16  /  AlkPhos  93  07-20        CAPILLARY BLOOD GLUCOSE            RADIOLOGY & ADDITIONAL TESTS:  < from: CT Abdomen and Pelvis No Cont (07.19.21 @ 17:18) >    EXAM:  CT ABDOMEN AND PELVIS                            PROCEDURE DATE:  07/19/2021          INTERPRETATION:  CLINICAL INFORMATION: Left flank pain. Antecedent history of bladder cancer    COMPARISON: 8/17/2019    CONTRAST/COMPLICATIONS:  IV Contrast: NONE  Oral Contrast: NONE  Complications: None reported at time of study completion    PROCEDURE:  CT of the Abdomen and Pelvis was performed.  Sagittal and coronal reformats were performed.  Evaluation solid organ parenchyma and urinary tract significantly limited by lack of IV contrast.  FINDINGS:  LOWER CHEST: In situ cardiac pacer. Clear lung bases..    LIVER: Within normal limits.  BILE DUCTS: Normal caliber.  GALLBLADDER: Within normal limits.  SPLEEN: Within normal limits.  PANCREAS: Within normal limits.  ADRENALS: Within normal limits.  KIDNEYS/URETERS: Severe end-stage chronic left hydroureteronephrosis with marked thinning of the cortical mantle, worsened compared to prior. No obstructing calculus. Right nephrostomy catheter in situ. No hydronephrosis or urolithiasis on the right.    BLADDER: Extensive poorly defined lobulated soft tissue in the bladder consistent with the history of bladder neoplasm. Associated hematoma difficult to exclude.  REPRODUCTIVE ORGANS: No gynecologic mass    BOWEL: No bowel obstruction. Appendix no appendicitis  PERITONEUM: No ascites.  VESSELS: Nonaneurysmal.  RETROPERITONEUM/LYMPH NODES: No lymphadenopathy.  ABDOMINAL WALL: Within normal limits.  BONES: No acute or aggressive pathology.    IMPRESSION:  Significantly limited by lack of IV contrast.  Severe end-stage chronic left hydroureteronephrosis without obstructing calculus presumably related to bladder neoplasm.  Poorly defined bladder soft tissue density consistent with afforded history of bladder neoplasm.        --- End of Report ---            GERA PIKE MD; Attending Radiologist  This document has been electronically signed. Jul 19 2021  5:41PM    < end of copied text >  < from: Xray Chest 2 Views PA/Lat (07.19.21 @ 15:45) >    EXAM:  XR CHEST PA LAT 2V                            PROCEDURE DATE:  07/19/2021          INTERPRETATION:  INDICATION: Chest Pain    PRIORS: 4/17/2020.    VIEWS: Frontal and lateral radiographs of the chest performed.    FINDINGS: Heart size appears within normal limits. There is made of an indwelling pacemaker. No hilar or superior mediastinal abnormalities are identified.  Circumscribed nodularity identified overlying the left lower lung field most consistent with a nipple shadow.  There is no evidence for focal infiltrate, lobar consolidation or pulmonary edema. No pleural effusion or pneumothorax is demonstrated. No mediastinal shift is noted. The visualized osseous structures appear unremarkable.    IMPRESSION: No evidence for focal infiltrate or lobar consolidation.    --- End of Report ---            EMA MASTERS MD; Attending Radiologist  This document has been electronically signed. Jul 20 2021  6:26PM    < end of copied text >    Imaging Personally Reviewed:  YES    Consultant(s) Notes Reviewed:   YES    Care Discussed with Consultants : ID, IR , cariology , urology , [email or web address]     Plan of care was discussed with patient and /or primary care giver; all questions and concerns were addressed and care was aligned with patient's wishes.

## 2021-07-21 NOTE — CHART NOTE - NSCHARTNOTEFT_GEN_A_CORE
EVENT: Pt refused antibiotics after looking at side effects of medication, stating she does not know why antibiotics were changed but she is feeling extra tired with new antibiotics. Also states she feels shaky as though her blood sugar was low. Requesting Tylenol to help her feel better     BRIEF HPI:55 year old female with PMHx of hypertension,  pacemaker for sick sinus syndrome ([place], ) bladder masses, questionable bladder cancer,  left-sided hydronephrosis and Rt PCN  presents to the ED with complaints of left-sided chest pain and left flank pain x2 weeks. Pt follows with Dr. Trinidad. Per  while patient was initially thought to have T1 disease in bladder, appearance on recent imaging may suggest T3. However, recently had PET on 7/16/21 demonstrating new FDG-avid left para-aortic LN in RP space, consistent w/ possible met. Urine culture results from  clear catch with no growth. IR consulted for possible PCT tube exchange and possible biopsy for Lt para- aortic LN, pt requested anesthesias for tube exchange so  IR planned for this Friday, pt declined bx.     OBJECTIVE:  Vital Signs Last 24 Hrs  T(C): 36.6 (21 Jul 2021 20:29), Max: 36.6 (21 Jul 2021 11:38)  T(F): 97.8 (21 Jul 2021 20:29), Max: 97.8 (21 Jul 2021 11:38)  HR: 58 (21 Jul 2021 20:29) (52 - 75)  BP: 99/63 (21 Jul 2021 20:29) (99/63 - 127/72)  BP(mean): --  RR: 15 (21 Jul 2021 20:29) (14 - 16)  SpO2: 98% (21 Jul 2021 20:29) (98% - 100%)    FOCUSED PHYSICAL EXAM:  NEURO:  CV: S1 S2 mild parth  RESP: Even , unlabored  EXT: Gait steady  NEURO: Alert, oriented X 4    LABS:                        14.5   4.51  )-----------( 211      ( 21 Jul 2021 07:11 )             44.4   CARDIAC MARKERS ( 20 Jul 2021 08:18 )  <0.015 ng/mL / x     / x     / x     / x        07-21    137  |  102  |  35<H>  ----------------------------<  82  3.9   |  28  |  1.29    Ca    10.1      21 Jul 2021 07:11  Phos  3.3     07-20  Mg     2.3     07-20    TPro  7.8  /  Alb  3.8  /  TBili  0.6  /  DBili  x   /  AST  18  /  ALT  16  /  AlkPhos  93  07-20    POCT Blood Glucose.: 105 mg/dL (07-21-21 @ 22:02)    PLAN:   1. Pt refuse cefepime PM dose despite importance of same explained  2. Acetaminophen   Tablet 650 diego GRAM(s) Oral every 4 hours PRN Mild Pain (1 - 3)  3. Tramadol 50 diego GRAM(s) Oral every 6 hours PRN Moderate Pain (4 - 6)  4. Tramadol 75 diego GRAM(s) Oral every 6 hours PRN Severe Pain (7 - 10)    FOLLOW UP / RESULT: Monitor pt, reassess EVENT: Pt refused antibiotics after looking at side effects of medication, stating she does not know why antibiotics were changed but she is feeling extra tired with new antibiotics. Also states she feels shaky as though her blood sugar was low. Requesting Tylenol to help her feel better     BRIEF HPI:55 year old female with PMHx of hypertension,  pacemaker for sick sinus syndrome ([place], ) bladder masses, questionable bladder cancer,  left-sided hydronephrosis and Rt PCN  presents to the ED with complaints of left-sided chest pain and left flank pain x2 weeks. Pt follows with Dr. Trinidad. Per  while patient was initially thought to have T1 disease in bladder, appearance on recent imaging may suggest T3. However, recently had PET on 7/16/21 demonstrating new FDG-avid left para-aortic LN in RP space, consistent w/ possible met. Urine culture results from  clear catch with no growth. IR consulted for possible PCT tube exchange and possible biopsy for Lt para- aortic LN, pt requested anesthesias for tube exchange so  IR planned for this Friday, pt declined bx. Now refusing abx.    OBJECTIVE:  Vital Signs Last 24 Hrs  T(C): 36.6 (21 Jul 2021 20:29), Max: 36.6 (21 Jul 2021 11:38)  T(F): 97.8 (21 Jul 2021 20:29), Max: 97.8 (21 Jul 2021 11:38)  HR: 58 (21 Jul 2021 20:29) (52 - 75)  BP: 99/63 (21 Jul 2021 20:29) (99/63 - 127/72)  BP(mean): --  RR: 15 (21 Jul 2021 20:29) (14 - 16)  SpO2: 98% (21 Jul 2021 20:29) (98% - 100%)    FOCUSED PHYSICAL EXAM:  NEURO:  CV: S1 S2 mild parth  RESP: Even , unlabored  EXT: Gait steady  NEURO: Alert, oriented X 4    LABS:                        14.5   4.51  )-----------( 211      ( 21 Jul 2021 07:11 )             44.4   CARDIAC MARKERS ( 20 Jul 2021 08:18 )  <0.015 ng/mL / x     / x     / x     / x        07-21    137  |  102  |  35<H>  ----------------------------<  82  3.9   |  28  |  1.29    Ca    10.1      21 Jul 2021 07:11  Phos  3.3     07-20  Mg     2.3     07-20    TPro  7.8  /  Alb  3.8  /  TBili  0.6  /  DBili  x   /  AST  18  /  ALT  16  /  AlkPhos  93  07-20    POCT Blood Glucose.: 105 mg/dL (07-21-21 @ 22:02)    PLAN:   1. Pt refuse cefepime PM dose despite importance of same explained abx changed back to ceftriaxone as Dr Rodriguez  2. Acetaminophen   Tablet 650 diego GRAM(s) Oral every 4 hours PRN Mild Pain (1 - 3)  3. Tramadol 50 diego GRAM(s) Oral every 6 hours PRN Moderate Pain (4 - 6)  4. Tramadol 75 diego GRAM(s) Oral every 6 hours PRN Severe Pain (7 - 10)    FOLLOW UP / RESULT: Monitor pt, reassess    Discussed with Dr Rodriguez

## 2021-07-21 NOTE — PROGRESS NOTE ADULT - ASSESSMENT
complete note to follow   complete note to follow      Assessment and Recommendation:   · Assessment	  Pt is a 55 year old female with PMHx of hypertension,  pacemaker for sick sinus syndrome ([place], ) bladder masses, questionable bladder cancer,  left-sided hydronephrosis and Rt PCN  presents to the ED with complaints of left-sided chest pain and left flank pain x2 weeks. Patient states that she has been feeling left-sided aching chest pain wrapping around to her flank and down the left side of her abdomen.   Patient reports that she has history of similar symptoms in the past.  Patient states that she was recently diagnosed with a UTI last month, and states that she was treated for pyelonephritis and completed her full course of antibiotics. She also endorses that her PCN tube isnot draining very well but it drains better when flushing it.      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 Trinidad [phone]  CT shows end-stage Left hydro, Right NT no hydro and extensive poorly defined lobulated bladder mass  Rec's:  -Left another message for Dr. Trinidad to obtain more details about Dx/path, staging, tx etc.  -On abx for pyelonephritis, ID following  -Appreciate Urology consult, recommend NT exchange  -IR will preform NT exchange on friday, pt refused LN Bx  -pt has not [place] [facility] and would like to f/u in our office for Medical Oncology. Prognosis/Tx pending addt'l information  -further recommendations pending above    Thank you for the referral. Will continue to monitor the patient.  Please call with any questions [phone]  Above reviewed with Attending Dr. Garcia           Assessment and Recommendation:   · Assessment	  Pt is a 55 year old female with PMHx of hypertension,  pacemaker for sick sinus syndrome ([place], ) bladder masses, questionable bladder cancer,  left-sided hydronephrosis and Rt PCN  presents to the ED with complaints of left-sided chest pain and left flank pain x2 weeks. Patient states that she has been feeling left-sided aching chest pain wrapping around to her flank and down the left side of her abdomen.   Patient reports that she has history of similar symptoms in the past.  Patient states that she was recently diagnosed with a UTI last month, and states that she was treated for pyelonephritis and completed her full course of antibiotics. She also endorses that her PCN tube isnot draining very well but it drains better when flushing it.      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 Trinidad [phone]  CT shows end-stage Left hydro, Right NT no hydro and extensive poorly defined lobulated bladder mass  Rec's:  -Left another message for Dr. Trinidad to obtain more details about Dx/path, staging, tx etc.  -On abx for pyelonephritis, ID following  -Appreciate Urology consult, recommend NT exchange  -IR will preform NT exchange on friday, pt refused LN Bx  -pt has not [place] [facility] and would like to f/u in our office for Medical Oncology. Prognosis/Tx pending addt'l information  -further recommendations pending above    Thank you for the referral. Will continue to monitor the patient.  Please call with any questions [phone]  Above reviewed with Attending Dr. Garcia

## 2021-07-21 NOTE — PROGRESS NOTE ADULT - PROBLEM SELECTOR PLAN 4
Patient has past history of bladder mass.   CT abdomen showing Severe end-stage chronic left hydroureteronephrosis without obstructing calculus presumably related to bladder neoplasm. Poorly defined bladder soft tissue density consistent with afforded history of bladder neoplasm.  Appreciate Urology recs - pending IR to serafin BAUER & possible tube exchange  [facility] consulted ( Zoë)- team Left message for Dr Trinidad to obtain more details about Dx/path, staging, tx etc  pt has not followed up with [facility] and would like to f/u in the office for Medical Oncology. Prognosis/Tx pending addt'l information - Zoë TINOCO left msg to pt's outpt oncology

## 2021-07-21 NOTE — PROGRESS NOTE ADULT - SUBJECTIVE AND OBJECTIVE BOX
CARDIOLOGY     PROGRESS  NOTE   ________________________________________________    CHIEF Complaint: patient is a 55y old  Female who presents with a chief complaint of   doing better.  	  REVIEW OF SYSTEMS:  CONSTITUTIONAL: No fever, weight loss, or fatigue  EYES: No eye pain, visual disturbances, or discharge  ENT:  No difficulty hearing, tinnitus, vertigo; No sinus or throat pain  NECK: No pain or stiffness  RESPIRATORY: No cough, wheezing, chills or hemoptysis; No Shortness of Breath  CARDIOVASCULAR: No chest pain, palpitations, passing out, dizziness, or leg swelling  GASTROINTESTINAL: No abdominal or epigastric pain. No nausea, vomiting, or hematemesis; No diarrhea or constipation. No melena or hematochezia.  GENITOURINARY: No dysuria, frequency, hematuria, or incontinence  NEUROLOGICAL: No headaches, memory loss, loss of strength, numbness, or tremors  SKIN: No itching, burning, rashes, or lesions   LYMPH Nodes: No enlarged glands  ENDOCRINE: No heat or cold intolerance; No hair loss  MUSCULOSKELETAL: No joint pain or swelling; No muscle, back, or extremity pain  PSYCHIATRIC: No depression, anxiety, mood swings, or difficulty sleeping  HEME/LYMPH: No easy bruising, or bleeding gums  ALLERGY AND IMMUNOLOGIC: No hives or eczema	    [ ] All others negative	  [ ] Unable to obtain    PHYSICAL EXAM:  T(C): 36.3 (07-21-21 @ 05:05), Max: 36.6 (07-20-21 @ 12:59)  HR: 52 (07-21-21 @ 05:05) (5 - 58)  BP: 127/72 (07-21-21 @ 05:05) (118/69 - 139/88)  RR: 16 (07-21-21 @ 05:05) (16 - 17)  SpO2: 100% (07-21-21 @ 05:05) (99% - 100%)  Wt(kg): --  I&O's Summary    20 Jul 2021 07:01  -  21 Jul 2021 07:00  --------------------------------------------------------  IN: 0 mL / OUT: 275 mL / NET: -275 mL        Appearance: Normal	  HEENT:   Normal oral mucosa, PERRL, EOMI	  Lymphatic: No lymphadenopathy  Cardiovascular: Normal S1 S2, No JVD, + murmurs, No edema  Respiratory: Lungs clear to auscultation	  Psychiatry: A & O x 3, Mood & affect appropriate  Gastrointestinal:  Soft, Non-tender, + BS	  Skin: No rashes, No ecchymoses, No cyanosis	  Neurologic: Non-focal  Extremities: Normal range of motion, No clubbing, cyanosis or edema  Vascular: Peripheral pulses palpable 2+ bilaterally    MEDICATIONS  (STANDING):  cefepime   IVPB 1000 milliGRAM(s) IV Intermittent every 8 hours  enoxaparin Injectable 40 milliGRAM(s) SubCutaneous daily  polyethylene glycol 3350 17 Gram(s) Oral daily  senna 2 Tablet(s) Oral at bedtime      TELEMETRY: 	    ECG:  	  RADIOLOGY:  OTHER: 	  	  LABS:	 	    CARDIAC MARKERS:  CARDIAC MARKERS ( 20 Jul 2021 08:18 )  <0.015 ng/mL / x     / x     / x     / x      CARDIAC MARKERS ( 19 Jul 2021 15:18 )  <0.015 ng/mL / x     / x     / x     / x                                    14.5   4.51  )-----------( 211      ( 21 Jul 2021 07:11 )             44.4     07-21    137  |  102  |  35<H>  ----------------------------<  82  3.9   |  28  |  1.29    Ca    10.1      21 Jul 2021 07:11  Phos  3.3     07-20  Mg     2.3     07-20    TPro  7.8  /  Alb  3.8  /  TBili  0.6  /  DBili  x   /  AST  18  /  ALT  16  /  AlkPhos  93  07-20    proBNP:   Lipid Profile: Cholesterol 243  LDL --  HDL 74  TG 71    HgA1c:   TSH: Thyroid Stimulating Hormone, Serum: 0.65 uU/mL (07-20 @ 08:18)    55 year old female with PMHx of hypertension, 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 These symptoms are chronic per patient. Seen to have gross left HUN w/ marked cortical thinning w/ rt PCN in place with no hydro. Patient is nonseptic and UA not suggestive of definitive UTI. Cr at patient baseline.     --Per pt right PCN draining poorly. Recommend IR for tube exchange  --Discussed case w/ Dr Trinidad (outpatient urologist). Given findings on PET, recommends IR biopsy of left para-aortic node as seen on scan. C/s IR for evaluation for rosa biopsy       Assessment and plan  ---------------------------  55 year old female with PMHx of hypertension, bladder masses, questionable bladder cancer, and left-sided hydronephrosis presents to the ED with complaints of left-sided chest pain and left flank pain x2 weeks. Patient states that she has been feeling left-sided aching chest pain wrapping around to her flank and down the left side of her abdomen. Patient reports that she has history of similar symptoms in the past. Patient additionally endorses some hematuria. Patient states that she was recently diagnosed with a UTI last month, and states that she was treated for pyelonephritis and completed her full course of antibiotics. Patient otherwise denies any fever, chills, shortness of breath, cough, hemoptysis, and all other acute complaints  pt is a 54 yo well known to me with hx of bradycardia/ sss, s/p ppm now  with chest pain.  check cardiac enzymes  tsh  check d dimer r/o PE  pt had a recent echo and stress test in my office was negative, will review  office records checked normal echo and stress test  continue abx as per ID  ,fu cultures  add lipitor  transfer to [place] if can not be done in [facility]

## 2021-07-21 NOTE — PROGRESS NOTE ADULT - SUBJECTIVE AND OBJECTIVE BOX
DAILY PROGRESS NOTE:         24 hr events:  states her right PCN is not draining well  flank pain resolving. no cp/sob     Objective:    Vital Signs Last 24 Hrs  T(C): 36.3 (2021 05:05), Max: 36.6 (2021 12:59)  T(F): 97.3 (2021 05:05), Max: 97.9 (2021 12:59)  HR: 52 (2021 05:05) (5 - 58)  BP: 127/72 (2021 05:05) (118/69 - 139/88)  BP(mean): --  RR: 16 (2021 05:05) (16 - 17)  SpO2: 100% (2021 05:05) (99% - 100%)    I&O's Detail    2021 07:01  -  2021 07:00  --------------------------------------------------------  IN:  Total IN: 0 mL    OUT:    Nephrostomy Tube (mL): 275 mL  Total OUT: 275 mL    Total NET: -275 mL          Physical Exam:    General: NAD, well-nourished  HEENT: Atraumatic, EOMI  Resp: Breathing comfortably on RA  CV: regular rate, no edema.   Ext: ROMIx4, motor strength intact x 4  : right NT in place   Psych: AOx3  Neuro: No focal deficits       Laboratory Results:                          14.5   4.51  )-----------( 211      ( 2021 07:11 )             44.4     07-21    137  |  102  |  35<H>  ----------------------------<  82  3.9   |  28  |  1.29    Ca    10.1      2021 07:11  Phos  3.3     07-20  Mg     2.3     07-20    TPro  7.8  /  Alb  3.8  /  TBili  0.6  /  DBili  x   /  AST  18  /  ALT  16  /  AlkPhos  93  07-20      Urinalysis Basic - ( 2021 16:19 )    Color: Yellow / Appearance: very cloudy / S.015 / pH: x  Gluc: x / Ketone: Negative  / Bili: Negative / Urobili: Negative   Blood: x / Protein: 500 mg/dL / Nitrite: Negative   Leuk Esterase: Small / RBC: 25-50 /HPF / WBC 26-50 /HPF   Sq Epi: x / Non Sq Epi: Few /HPF / Bacteria: Moderate /HPF

## 2021-07-21 NOTE — PROGRESS NOTE ADULT - SUBJECTIVE AND OBJECTIVE BOX
Patient is a 55y old  Female who presents with a chief complaint of     SUBJECTIVE / OVERNIGHT EVENTS:    ADDITIONAL REVIEW OF SYSTEMS:    MEDICATIONS  (STANDING):  cefepime   IVPB 1000 milliGRAM(s) IV Intermittent every 8 hours  enoxaparin Injectable 40 milliGRAM(s) SubCutaneous daily  polyethylene glycol 3350 17 Gram(s) Oral daily  senna 2 Tablet(s) Oral at bedtime    MEDICATIONS  (PRN):    Vital Signs Last 24 Hrs  T(C): 36.6 (2021 11:38), Max: 36.6 (2021 12:59)  T(F): 97.8 (2021 11:38), Max: 97.9 (2021 12:59)  HR: 75 (2021 11:38) (5 - 75)  BP: 113/76 (2021 11:38) (113/76 - 139/88)  BP(mean): --  RR: 14 (2021 11:38) (14 - 17)  SpO2: 98% (2021 11:38) (98% - 100%)      LABS:                        14.5   4.51  )-----------( 211      ( 2021 07:11 )             44.4     07-21    137  |  102  |  35<H>  ----------------------------<  82  3.9   |  28  |  1.29    Ca    10.1      2021 07:11  Phos  3.3     07-20  Mg     2.3     07-20    TPro  7.8  /  Alb  3.8  /  TBili  0.6  /  DBili  x   /  AST  18  /  ALT  16  /  AlkPhos  93  07-20      CARDIAC MARKERS ( 2021 08:18 )  <0.015 ng/mL / x     / x     / x     / x      CARDIAC MARKERS ( 2021 15:18 )  <0.015 ng/mL / x     / x     / x     / x          Urinalysis Basic - ( 2021 16:19 )    Color: Yellow / Appearance: very cloudy / S.015 / pH: x  Gluc: x / Ketone: Negative  / Bili: Negative / Urobili: Negative   Blood: x / Protein: 500 mg/dL / Nitrite: Negative   Leuk Esterase: Small / RBC: 25-50 /HPF / WBC 26-50 /HPF   Sq Epi: x / Non Sq Epi: Few /HPF / Bacteria: Moderate /HPF        Culture - Urine (collected 2021 22:16)  Source: Clean Catch Clean Catch (Midstream)  Final Report (2021 20:27):    <10,000 CFU/mL Normal Urogenital Vandana      COVID-19 PCR: NotDetec (2021 19:33)         Patient is a 55y old  Female who presents with a chief complaint of     SUBJECTIVE / OVERNIGHT EVENTS: events noted. Plan for Rigth NT exchange. Pt refused LN Bx d/t prior bx already done and was negative as per pt      MEDICATIONS  (STANDING):  cefepime   IVPB 1000 milliGRAM(s) IV Intermittent every 8 hours  enoxaparin Injectable 40 milliGRAM(s) SubCutaneous daily  polyethylene glycol 3350 17 Gram(s) Oral daily  senna 2 Tablet(s) Oral at bedtime    MEDICATIONS  (PRN):    Vital Signs Last 24 Hrs  T(C): 36.6 (2021 11:38), Max: 36.6 (2021 12:59)  T(F): 97.8 (2021 11:38), Max: 97.9 (2021 12:59)  HR: 75 (2021 11:38) (5 - 75)  BP: 113/76 (2021 11:38) (113/76 - 139/88)  BP(mean): --  RR: 14 (2021 11:38) (14 - 17)  SpO2: 98% (2021 11:38) (98% - 100%)    GEN: NAD; A and O x 3  LUNGS: CTA B/L  HEART: S1 S2, left CW pacemaker  ABDOMEN: soft, non-tender, non-distended, + BS, right NT  EXTREMITIES: no edema  NERVOUS SYSTEM:  Awake and alert; no focal neuro deficits    LABS:                        14.5   4.51  )-----------( 211      ( 2021 07:11 )             44.4     07-21    137  |  102  |  35<H>  ----------------------------<  82  3.9   |  28  |  1.29    Ca    10.1      2021 07:11  Phos  3.3     07-20  Mg     2.3     07-20    TPro  7.8  /  Alb  3.8  /  TBili  0.6  /  DBili  x   /  AST  18  /  ALT  16  /  AlkPhos  93  07-20      CARDIAC MARKERS ( 2021 08:18 )  <0.015 ng/mL / x     / x     / x     / x      CARDIAC MARKERS ( 2021 15:18 )  <0.015 ng/mL / x     / x     / x     / x          Urinalysis Basic - ( 2021 16:19 )    Color: Yellow / Appearance: very cloudy / S.015 / pH: x  Gluc: x / Ketone: Negative  / Bili: Negative / Urobili: Negative   Blood: x / Protein: 500 mg/dL / Nitrite: Negative   Leuk Esterase: Small / RBC: 25-50 /HPF / WBC 26-50 /HPF   Sq Epi: x / Non Sq Epi: Few /HPF / Bacteria: Moderate /HPF        Culture - Urine (collected 2021 22:16)  Source: Clean Catch Clean Catch (Midstream)  Final Report (2021 20:27):    <10,000 CFU/mL Normal Urogenital Vandana      COVID-19 PCR: NotDetec (2021 19:33)

## 2021-07-21 NOTE — PROGRESS NOTE ADULT - ASSESSMENT
Patient is a 55y old  Female with PMHx of hypertension, bladder masses, questionable bladder cancer, and left-sided hydronephrosis presents to the ER for evaluation of left-sided chest pain and left flank pain x2 weeks. Patient states that she has been feeling left-sided aching chest pain wrapping around to her flank and down the left side of her abdomen. Patient reports that she has history of similar symptoms in the past.  Sterling was recently diagnosed with a UTI last month, and states that she was treated for pyelonephritis and completed her full course of antibiotics. ON admission, she has no fever or Leukocytosis but positive urine analysis. The CT abd/pelvis shows Severe end-stage chronic left hydroureteronephrosis without obstructing calculus presumably related to bladder neoplasm. She has started on Ceftriaxone and the ID consult requested to assist with further evaluation and antibiotic management.        #Complicated  UTI  - in the setting of Right PCN  # Chronic left hydroureteronephrosis without obstructing calculus   # Bladder cancer    would recommend:    1.  2. Please flush the Right PCN since not draining well,   3. IF cannot be flushed, please contact IR for possible tube exchange as per Urology  4.. Continue Cefepime until work up is done  5. Monitor  kidney function   6. Pain management as needed     d/w patient and Nursing staff    Attending Attestation:    Spent more than 45 minutes on total encounter, more than 50 % of the visit was spent counseling and/or coordinating care by the Attending physician. Patient is a 55y old  Female with PMHx of hypertension, bladder masses, questionable bladder cancer, and left-sided hydronephrosis presents to the ER for evaluation of left-sided chest pain and left flank pain x2 weeks. Patient states that she has been feeling left-sided aching chest pain wrapping around to her flank and down the left side of her abdomen. Patient reports that she has history of similar symptoms in the past.  Sterling was recently diagnosed with a UTI last month, and states that she was treated for pyelonephritis and completed her full course of antibiotics. ON admission, she has no fever or Leukocytosis but positive urine analysis. The CT abd/pelvis shows Severe end-stage chronic left hydroureteronephrosis without obstructing calculus presumably related to bladder neoplasm. She has started on Ceftriaxone and the ID consult requested to assist with further evaluation and antibiotic management.        #Complicated  UTI  - in the setting of Right PCN  # Chronic left hydroureteronephrosis without obstructing calculus   # Bladder cancer    would recommend:    1. Please change Cefepime to Ceftriaxone 1 g daily  2. Need ABx until 7/29/21  3. Monitor  kidney function   4. Pain management as needed     d/w patient, covering NP and Nursing staff    Attending Attestation:    Spent more than 35 minutes on total encounter, more than 50 % of the visit was spent counseling and/or coordinating care by the Attending physician.

## 2021-07-21 NOTE — PROGRESS NOTE ADULT - ASSESSMENT
55 year old female with PMHx of hypertension,  pacemaker for sick sinus syndrome ([place], ) bladder masses, questionable bladder cancer,  left-sided hydronephrosis and Rt PCN  presents to the ED with complaints of left-sided chest pain and left flank pain x2 weeks. Pt follows with Dr. Trinidad. Per  while patient was initially thought to have T1 disease in bladder, appearance on recent imaging may suggest T3. However, recently had PET on 7/16/21 demonstrating new FDG-avid left para-aortic LN in RP space, consistent w/ possible met. Urine culture results from  clear catch with no growth. IR consulted for possible PCT tube exchange and possible biopsy for Lt para- aortic LN, pt requested anesthesias for tube exchange so  IR planned for this Friday, pt declined bx.

## 2021-07-21 NOTE — PROGRESS NOTE ADULT - SUBJECTIVE AND OBJECTIVE BOX
Patient is seen and examined at the bed side, is afebrile.   She mentioned feeling little better, BUT Right PCN has to flushed often.       \REVIEW OF SYSTEMS: All other review systems are negative      ALLERGIES: No Known Allergies      Vital Signs Last 24 Hrs  T(C): 36.6 (2021 11:38), Max: 36.6 (2021 20:29)  T(F): 97.8 (2021 11:38), Max: 97.8 (2021 20:29)  HR: 75 (2021 11:38) (5 - 75)  BP: 113/76 (2021 11:38) (113/76 - 127/72)  BP(mean): --  RR: 14 (2021 11:38) (14 - 16)  SpO2: 98% (2021 11:38) (98% - 100%)      PHYSICAL EXAM:  GENERAL: Not in distress   CHEST/LUNG: Not using accessory muscles   HEART: s1 and s2 present  ABDOMEN:  Nontender and  Nondistended  : Right PCN in placed, draining yellow color urine  EXTREMITIES: No pedal  edema  CNS: Awake and Alert      LABS:                        14.5   4.51  )-----------( 211      ( 2021 07:11 )             44.4                           13.6   4.52  )-----------( 214      ( 2021 08:18 )             41.2       07-21    137  |  102  |  35<H>  ----------------------------<  82  3.9   |  28  |  1.29    Ca    10.1      2021 07:11  Phos  3.3     07-20  Mg     2.3     07-20    TPro  7.8  /  Alb  3.8  /  TBili  0.6  /  DBili  x   /  AST  18  /  ALT  16  /  AlkPhos  93  07-20    07-20    139  |  104  |  24<H>  ----------------------------<  73  3.9   |  25  |  1.10    Ca    9.9      2021 08:18  Phos  3.3     07-20  Mg     2.3     07-20    TPro  7.8  /  Alb  3.8  /  TBili  0.6  /  DBili  x   /  AST  18  /  ALT  16  /  AlkPhos  93        Urinalysis Basic - ( 2021 16:19 )  Color: Yellow / Appearance: very cloudy / S.015 / pH: x  Gluc: x / Ketone: Negative  / Bili: Negative / Urobili: Negative   Blood: x / Protein: 500 mg/dL / Nitrite: Negative   Leuk Esterase: Small / RBC: 25-50 /HPF / WBC 26-50 /HPF   Sq Epi: x / Non Sq Epi: Few /HPF / Bacteria: Moderate /HPF        MEDICATIONS  (STANDING):    cefepime   IVPB 1000 milliGRAM(s) IV Intermittent every 8 hours  enoxaparin Injectable 40 milliGRAM(s) SubCutaneous daily  polyethylene glycol 3350 17 Gram(s) Oral daily  senna 2 Tablet(s) Oral at bedtime      RADIOLOGY & ADDITIONAL TESTS:     CT Abdomen and Pelvis No Cont (21 @ 17:18) Significantly limited by lack of IV contrast.  Severe end-stage chronic left hydroureteronephrosis without obstructing calculus presumably related to bladder neoplasm.  Poorly defined bladder soft tissue density consistent with afforded history of bladder neoplasm.      MICROBIOLOGY DATA:    Culture - Urine (21 @ 22:16)   Specimen Source: Clean Catch Clean Catch (Midstream)   Culture Results: <10,000 CFU/mL Normal Urogenital Vandana     COVID-19 PCR . (21 @ 19:33)   COVID-19 PCR: NotDetec:              Patient is seen and examined at the bed side, is afebrile.  She is refusing Cefepime because its making dizzy and weak but mentioned tolerating Ceftriaxone.       \REVIEW OF SYSTEMS: All other review systems are negative      ALLERGIES: No Known Allergies      Vital Signs Last 24 Hrs  T(C): 36.6 (2021 11:38), Max: 36.6 (2021 20:29)  T(F): 97.8 (2021 11:38), Max: 97.8 (2021 20:29)  HR: 75 (2021 11:38) (5 - 75)  BP: 113/76 (2021 11:38) (113/76 - 127/72)  BP(mean): --  RR: 14 (2021 11:38) (14 - 16)  SpO2: 98% (2021 11:38) (98% - 100%)      PHYSICAL EXAM:  GENERAL: Not in distress   CHEST/LUNG: Not using accessory muscles   HEART: s1 and s2 present  ABDOMEN:  Nontender and  Nondistended  : Right PCN in placed, draining yellow color urine  EXTREMITIES: No pedal  edema  CNS: Awake and Alert      LABS:                        14.5   4.51  )-----------( 211      ( 2021 07:11 )             44.4                           13.6   4.52  )-----------( 214      ( 2021 08:18 )             41.2       07-21    137  |  102  |  35<H>  ----------------------------<  82  3.9   |  28  |  1.29    Ca    10.1      2021 07:11  Phos  3.3     07-20  Mg     2.3     07-20    TPro  7.8  /  Alb  3.8  /  TBili  0.6  /  DBili  x   /  AST  18  /  ALT  16  /  AlkPhos  93  07-20    07-20    139  |  104  |  24<H>  ----------------------------<  73  3.9   |  25  |  1.10    Ca    9.9      2021 08:18  Phos  3.3     07-20  Mg     2.3     07-20    TPro  7.8  /  Alb  3.8  /  TBili  0.6  /  DBili  x   /  AST  18  /  ALT  16  /  AlkPhos  93        Urinalysis Basic - ( 2021 16:19 )  Color: Yellow / Appearance: very cloudy / S.015 / pH: x  Gluc: x / Ketone: Negative  / Bili: Negative / Urobili: Negative   Blood: x / Protein: 500 mg/dL / Nitrite: Negative   Leuk Esterase: Small / RBC: 25-50 /HPF / WBC 26-50 /HPF   Sq Epi: x / Non Sq Epi: Few /HPF / Bacteria: Moderate /HPF        MEDICATIONS  (STANDING):    cefepime   IVPB 1000 milliGRAM(s) IV Intermittent every 8 hours  enoxaparin Injectable 40 milliGRAM(s) SubCutaneous daily  polyethylene glycol 3350 17 Gram(s) Oral daily  senna 2 Tablet(s) Oral at bedtime      RADIOLOGY & ADDITIONAL TESTS:     CT Abdomen and Pelvis No Cont (21 @ 17:18) Significantly limited by lack of IV contrast.  Severe end-stage chronic left hydroureteronephrosis without obstructing calculus presumably related to bladder neoplasm.  Poorly defined bladder soft tissue density consistent with afforded history of bladder neoplasm.      MICROBIOLOGY DATA:    Culture - Urine (21 @ 22:16)   Specimen Source: Clean Catch Clean Catch (Midstream)   Culture Results: <10,000 CFU/mL Normal Urogenital Vandana     COVID-19 PCR . (21 @ 19:33)   COVID-19 PCR: NotDetec:

## 2021-07-21 NOTE — PROGRESS NOTE ADULT - ASSESSMENT
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 These symptoms are chronic per patient. Seen to have gross left HUN w/ marked cortical thinning w/ rt PCN in place with no hydro. Patient is nonseptic and UA not suggestive of definitive UTI. Cr at patient baseline.     --Per pt right PCN draining poorly. Recommend IR for tube exchange  --Discussed case w/ Dr Trinidad (outpatient urologist). Given findings on PET, recommends IR biopsy of left para-aortic node as seen on scan. C/s IR for evaluation for rosa biopsy

## 2021-07-21 NOTE — PROGRESS NOTE ADULT - ASSESSMENT
_________________________________________________________________________________________  ========>>  M E D I C A L   A T T E N D I N G    F O L L O W  U P  N O T E  <<=========  -----------------------------------------------------------------------------------------------------    - Patient seen and examined by me earlier today.  pt seen together at bedside with IR team..   - In summary,  JEF HOUSE is a 55y year old woman admitted with Lft flank pain   - Patient today overall doing ok, comfortable, eating fairly  overall feels stronger      pt expressing frustrations about her diagnosis and treatments, mixed messages from various doctors and facilities she has been to over the past year re possible bladder cancer, biopsies, etc.. : pt reassured     ==================>> REVIEW OF SYSTEM <<=================    GEN: no fever, no chills, pain in left  flank on and off  > improved   RESP: no SOB, no cough, no sputum  CVS: no chest pain, no palpitations, no edema  GI: no abdominal pain, no nausea, no constipation, no diarrhea  : no dysuria, no frequency, no hematuria reported    Neuro: no headache, no dizziness  Derm : no itching, no rash    ==================>> PHYSICAL EXAM <<=================    GEN: A&O X 3 , NAD , comfortable, pleasant, calm , cachectic , more alert and interactive / energetic   HEENT: NCAT, PERRL, MMM, hearing intact  Neck: supple , no JVD appreciated  CVS: S1S2 , regular , No M/R/G appreciated  PULM: CTA B/L,  no W/R/R appreciated  ABD.: soft. non tender, non distended,  bowel sounds present  Extrem: intact pulses , no edema     nephrostomy to bag with yellow urine   PSYCH : normal mood,  not anxious                             ( Note written / Date of service 07-21-21 )    ==================>> MEDICATIONS <<====================    cefepime   IVPB 1000 milliGRAM(s) IV Intermittent every 8 hours  enoxaparin Injectable 40 milliGRAM(s) SubCutaneous daily  polyethylene glycol 3350 17 Gram(s) Oral daily  senna 2 Tablet(s) Oral at bedtime    ___________  Active diet:  Diet, Regular:   DASH/TLC Sodium & Cholesterol Restricted  ___________________    ==================>> VITAL SIGNS <<==================  Height (cm): 165.1  Weight (kg): 44.5  BMI (kg/m2): 16.3  Vital Signs Last 24 HrsT(C): 36.6 (07-21-21 @ 11:38)  T(F): 97.8 (07-21-21 @ 11:38), Max: 97.8 (07-20-21 @ 20:29)  HR: 75 (07-21-21 @ 11:38) (5 - 75)  BP: 113/76 (07-21-21 @ 11:38)  RR: 14 (07-21-21 @ 11:38) (14 - 16)  SpO2: 98% (07-21-21 @ 11:38) (98% - 100%)       ==================>> LAB AND IMAGING <<==================                        14.5   4.51  )-----------( 211      ( 21 Jul 2021 07:11 )             44.4        07-21    137  |  102  |  35<H>  ----------------------------<  82  3.9   |  28  |  1.29    Ca    10.1      21 Jul 2021 07:11  Phos  3.3     07-20  Mg     2.3     07-20    TPro  7.8  /  Alb  3.8  /  TBili  0.6  /  DBili  x   /  AST  18  /  ALT  16  /  AlkPhos  93  07-20    WBC count:   4.51 <<== ,  4.52 <<== ,  5.23 <<==   Hemoglobin:   14.5 <<==,  13.6 <<==,  13.4 <<==  platelets:  211 <==, 214 <==, 207 <==    Creatinine:  1.29  <<==, 1.10  <<==, 1.17  <<==  Sodium:   137  <==, 139  <==, 137  <==       AST:          18 <== , 25 <==      ALT:        16  <== , 17  <==      AP:        93  <=, 91  <=     Bili:        0.6  <=, 0.4  <=    _______________________  C U L T U R E S :    Culture - Urine (collected 19 Jul 2021 22:16)  Source: Clean Catch Clean Catch (Midstream)  Final Report (20 Jul 2021 20:27):    <10,000 CFU/mL Normal Urogenital Vandana      COVID-19 PCR: NotDetelili (07-19-21 @ 19:33)    ___________________________________________________________________________________  ===============>>  A S S E S S M E N T   A N D   P L A N <<===============  ------------------------------------------------------------------------------------------    · Assessment	  55 year old female with PMHx of hypertension,  pacemaker for sick sinus syndrome ([place], ) bladder masses, questionable bladder cancer,  left-sided hydronephrosis and Rt PCN  presents to the ED with complaints of left-sided chest pain and left flank pain x2 weeks. Pt follows with Dr. Trinidad. Per  while patient was initially thought to have T1 disease in bladder, appearance on recent imaging may suggest T3. However, recently had PET on 7/16/21 demonstrating new FDG-avid left para-aortic LN in RP space, consistent w/ possible met    Problem/Plan - 1:  ·  Problem: complex UTI and Pyelonephritis.    - Patient presented with left flank pain. ( though left flank pain likely due to cysts and not pyelo)   - follow cultures from urine and nephrostomy  - Appreciate Urology and IR follow up   - Appreciate ID Dr Jennifer lopez- changed Rocephin to Cefepime.     Problem/Plan - 2:  ·  Problem: Nephrostomy status.  Plan: - Patient has past history of bladder mass.   - CT abdomen showing Severe end-stage chronic left hydroureteronephrosis without obstructing calculus presumably related to bladder neoplasm. Poorly defined bladder soft tissue density consistent with afforded history of bladder neoplasm.  - Appreciate Urology recs - Continue w/ Rt PCN. >> shayne for IR for tube change / exchange Friday with anesthesia ( per pt preference)        left kidney no plans for any intervention as no expectation for recovery as noted and discussed   -  and other providers have discussed with pt's Uro: Dr. Trinidad >> trying to get report of PET scan for possible Bx of RP lymph node >> however pt not amendable to LN Bx at this time as states has had such biopsy before and wants to hve Cystoscoopy and Biopsy of bladder mass instead ... >>  F/U     Problem/Plan - 3:  ·  Problem: Chest pain.  Plan: - Patient c/o left chest pain with left flank pain   - EKG showing sinus bradycardia  - Dr Rosario consulted- pt had a recent echo and stress test in Dr Rosario office was negative  - less likely cardiac as above     D-Dimer Assay, Quantitative: 170 ng/mL DDU (07.20.21 @ 08:18)  - no further cardiac work up needed.     Problem/Plan - 4:  ·  Problem: Bladder mass.    as above  - A oncology group on board   awaiting PET..     Problem/Plan - 5:  ·  Problem: HTN   - Patient has past  hsitory of HTN controlled on diet.    Problem/Plan - 6:  ·  Problem: Asthma.    - Patient has past history of asthma not on any meds and well controlled     GI/ DVT prophylaxis   --------------------------------------------  Case discussed with pt, IR, Cardio, NP...   Education given on findings and plan of care  ___________________________  H. NIEVES Ochoa.  Pager: [phone]

## 2021-07-21 NOTE — PROGRESS NOTE ADULT - SUBJECTIVE AND OBJECTIVE BOX
IR was called to evaluate right nephrostomy tube. Patient seen resting at bedside. Evaluated right nephrostomy tube, catheter was flushed with 5 cc sterile saline without resistance and reattached to leg bag. Dressing c/d/i . Draining well 275 cc clear yellow urine. However, patient states that the output decreased in comparison to before . Right nephrostomy tube was exchanged back in June 2021 in [facility]. Offered to the patient to exchange right nephrostomy tube today however patient prefers with anesthesia. Therefore, case is scheduled for this Friday.  IR was called to evaluate right nephrostomy tube. Patient seen resting at bedside. Evaluated right nephrostomy tube, catheter was flushed with 5 cc sterile saline without resistance and reattached to leg bag. Dressing c/d/i . Draining well 275 cc clear yellow urine. However, patient states that the output decreased in comparison to before. Right nephrostomy tube was exchanged in June 2021 in [facility], as per patient. Offered to the patient to exchange right nephrostomy tube today however patient prefers exchange with sedation. Therefore, case is scheduled for this Friday.

## 2021-07-21 NOTE — PROGRESS NOTE ADULT - PROBLEM SELECTOR PLAN 2
Patient has past history of bladder mass  CT abdomen showing Severe end-stage chronic left hydroureteronephrosis without obstructing calculus presumably related to bladder neoplasm. Poorly defined bladder soft tissue density consistent with afforded history of bladder neoplasm  Appreciate Urology recs -c/w Rt PCN. Pt reports right NT drains poorly, flushed it couple of times overnight. Awaiting Urine culture results from NT and clear catch  Contacted IR to eval NT & possible tube exchange : pt requested anesthesia, tube exchange this FRIDAY   pt to f/u with Dr Trinidad at d/c to discuss new PET findings and further options for management

## 2021-07-22 ENCOUNTER — NON-APPOINTMENT (OUTPATIENT)
Age: 56
End: 2021-07-22

## 2021-07-22 DIAGNOSIS — C77.2 SECONDARY AND UNSPECIFIED MALIGNANT NEOPLASM OF INTRA-ABDOMINAL LYMPH NODES: ICD-10-CM

## 2021-07-22 LAB
A1C WITH ESTIMATED AVERAGE GLUCOSE RESULT: 5.7 % — HIGH (ref 4–5.6)
ESTIMATED AVERAGE GLUCOSE: 117 MG/DL — HIGH (ref 68–114)
SARS-COV-2 RNA SPEC QL NAA+PROBE: SIGNIFICANT CHANGE UP

## 2021-07-22 RX ADMIN — CEFTRIAXONE 100 MILLIGRAM(S): 500 INJECTION, POWDER, FOR SOLUTION INTRAMUSCULAR; INTRAVENOUS at 00:34

## 2021-07-22 RX ADMIN — ENOXAPARIN SODIUM 40 MILLIGRAM(S): 100 INJECTION SUBCUTANEOUS at 11:42

## 2021-07-22 RX ADMIN — Medication 650 MILLIGRAM(S): at 11:16

## 2021-07-22 RX ADMIN — Medication 650 MILLIGRAM(S): at 10:16

## 2021-07-22 RX ADMIN — Medication 650 MILLIGRAM(S): at 00:09

## 2021-07-22 NOTE — PROGRESS NOTE ADULT - ASSESSMENT
complete note to follow   Assessment and Plan:   · Assessment	    Pt is a 55 year old female with PMHx of hypertension,  pacemaker for sick sinus syndrome ([place], ) bladder masses, questionable bladder cancer,  left-sided hydronephrosis and Rt PCN  presents to the ED with complaints of left-sided chest pain and left flank pain x2 weeks. Patient states that she has been feeling left-sided aching chest pain wrapping around to her flank and down the left side of her abdomen.   Patient reports that she has history of similar symptoms in the past.  Patient states that she was recently diagnosed with a UTI last month, and states that she was treated for pyelonephritis and completed her full course of antibiotics. She also endorses that her PCN tube isnot draining very well but it drains better when flushing it.      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 Trinidad [phone]  CT shows end-stage Left hydro, Right NT no hydro and extensive poorly defined lobulated bladder mass  Rec's:  -ALEJANDRINA Camp spoke with Dr. Trinidad and the pt originally followed with [facility] and then [facility] and then with her, the pt originally presented as a T1, but likely understaged as she p/w left hydro at that time. Moleculart studies were NOT done. She had a Left Retroperitoneal node Bx'd in 10/2020 which was (-) for malignancy. She was treated with neoadjuvant chemo with gem/cis x 2 cycles and then developed pyelo and did not restart chemo and lost to follow-up. She had a repeat PET 7/16/21 which showed a para-aortic LN, it is recommended that if it is accessible for biopsy to have it done to prove if met or not. If Bx is negative will need cystoscopy with biopsy to send out for molecular studies  -On abx for pyelonephritis, ID following  -Appreciate Urology consult, recommended NT exchange  -IR will perform NT exchange on friday  -Will ask IR if feasible to biopsy para-aortic node and go from there  -pt has not f/u with [facility] and would like to f/u in our office for Medical Oncology. Prognosis/Tx pending addt'l information  -further recommendations pending above  -can f/u with Dr. Garcia upon discharge    Thank you for the referral. Will continue to monitor the patient.  Please call with any questions [phone]  Above reviewed with Attending Dr. Garcia       Assessment and Plan:   · Assessment	    Pt is a 55 year old female with PMHx of hypertension,  pacemaker for sick sinus syndrome ([employer] [place], ) bladder masses, questionable bladder cancer,  left-sided hydronephrosis and Rt PCN  presents to the ED with complaints of left-sided chest pain and left flank pain x2 weeks. Patient states that she has been feeling left-sided aching chest pain wrapping around to her flank and down the left side of her abdomen.   Patient reports that she has history of similar symptoms in the past.  Patient states that she was recently diagnosed with a UTI last month, and states that she was treated for pyelonephritis and completed her full course of antibiotics. She also endorses that her PCN tube isnot draining very well but it drains better when flushing it.      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 Trinidad [phone]  CT shows end-stage Left hydro, Right NT no hydro and extensive poorly defined lobulated bladder mass  Rec's:  -ALEJANDRINA Camp spoke with Dr. Trinidad and the pt originally followed with [facility] and then [facility] and then with her, the pt originally presented as a T1, but likely understaged as she p/w left hydro at that time. Moleculart studies were NOT done. She had a Left Retroperitoneal node Bx'd in 10/2020 which was (-) for malignancy. She was treated with neoadjuvant chemo with gem/cis x 2 cycles and then developed pyelo and did not restart chemo and lost to follow-up. She had a repeat PET 7/16/21 which showed a para-aortic LN, it is recommended that if it is accessible for biopsy to have it done to prove if met or not. If Bx is negative will need cystoscopy with biopsy to send out for molecular studies  -On abx for pyelonephritis, ID following  -Appreciate Urology consult, recommended NT exchange  -IR will perform NT exchange on friday  -Will ask IR if feasible to biopsy para-aortic node and go from there  -pt has not f/u with [facility] and would like to f/u in our office for Medical Oncology  -further recommendations pending above  -can f/u with Dr. Garcia upon discharge    Thank you for the referral. Will continue to monitor the patient.  Please call with any questions [phone]  Above reviewed with Attending Dr. Garcia       Assessment and Plan:   · Assessment	    Pt is a 55 year old female with PMHx of hypertension,  pacemaker for sick sinus syndrome ([employer] [place], ) bladder masses, questionable bladder cancer,  left-sided hydronephrosis and Rt PCN  presents to the ED with complaints of left-sided chest pain and left flank pain x2 weeks. Patient states that she has been feeling left-sided aching chest pain wrapping around to her flank and down the left side of her abdomen.   Patient reports that she has history of similar symptoms in the past.  Patient states that she was recently diagnosed with a UTI last month, and states that she was treated for pyelonephritis and completed her full course of antibiotics. She also endorses that her PCN tube isnot draining very well but it drains better when flushing it.      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 Trinidad [phone]  CT shows end-stage Left hydro, Right NT no hydro and extensive poorly defined lobulated bladder mass  Rec's:  -ALEJANDRINA Camp spoke with Dr. Trinidad and the pt originally followed with [facility] and then [facility] and then with her, the pt originally presented as a T1, but likely understaged as she p/w left hydro at that time. Moleculart studies were NOT done. She had a Left Retroperitoneal node Bx'd in 10/2020 which was (-) for malignancy. She was treated with neoadjuvant chemo with gem/cis x 2 cycles and then developed pyelo and did not restart chemo and lost to follow-up. She had a repeat PET 7/16/21 which showed a para-aortic LN, it is recommended that if it is accessible for biopsy to have it done to prove if met or not. If Bx is negative will need cystoscopy with biopsy to send out for molecular studies  -On abx for pyelonephritis, ID following  -Appreciate Urology consult, recommended NT exchange  -IR will perform NT exchange on friday  -Will ask IR if feasible to biopsy para-aortic node and go from there  -pt has not f/u with [facility] and would like to f/u in our office for Medical Oncology  -further recommendations pending above  -can f/u with Dr. Garcia / [facility] upon discharge    Thank you for the referral. Will continue to monitor the patient.  Please call with any questions [phone]  Above reviewed with Attending Dr. Garcia

## 2021-07-22 NOTE — PROGRESS NOTE ADULT - PROBLEM SELECTOR PLAN 5
patient has pacemaker not on any meds Patient c/o left chest pain with left flank pain   EKG showing sinus bradycardia, Lipid panel -chol 243,  (elevated)-->may consider starting Statins  Appreciate Dr Marlene lopez - pt had a recent Echo and Stress test in the office, was negative  Trop #1- neg, trop 2 -neg and D Dimer- 170 wnl  no further cardiac work up needed

## 2021-07-22 NOTE — PROGRESS NOTE ADULT - PROBLEM SELECTOR PLAN 8
on Lovenox - will hold a dose on Friday   Weight loss / Cachexic - Nutrition consult placed on Lovenox - will hold a dose on Friday   Cachexic but gained 20lbs x yrs  - Nutrition consult placed

## 2021-07-22 NOTE — PROGRESS NOTE ADULT - ASSESSMENT
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 These symptoms are chronic per patient. Seen to have gross left HUN w/ marked cortical thinning w/ rt PCN in place with no hydro. Patient is nonseptic and UA not suggestive of definitive UTI. Cr at patient baseline.     --Per pt right PCN draining poorly. Recommend IR for tube exchange  --Discussed case w/ Dr Trinidad (outpatient urologist). Recommendations as bellow:   -- Given findings on PET, recommends IR biopsy of left para-aortic node as seen on scan. C/s IR for evaluation for rosa biopsy   --IR aware. Pending biopsy plan. At this time, would not recommend bladder biopsy before LND biopsy as tissue dx of new LND focus would change treatment plan.

## 2021-07-22 NOTE — PROGRESS NOTE ADULT - PROBLEM SELECTOR PLAN 2
Patient has past history of bladder mass  CT abdomen showing Severe end-stage chronic left hydroureteronephrosis without obstructing calculus presumably related to bladder neoplasm. Poorly defined bladder soft tissue density consistent with afforded history of bladder neoplasm  Appreciate Urology recs -c/w Rt PCN. Pt reports right NT drains poorly, flushed it couple of times overnight. Awaiting Urine culture results from NT and clear catch  Contacted IR to eval NT & possible tube exchange : pt requested anesthesia, tube exchange this FRIDAY   pt to f/u with Dr Trinidad at d/c to discuss new PET findings and further options for management Patient has past history of bladder mass  CT abdomen showing Severe end-stage chronic left hydroureteronephrosis without obstructing calculus presumably related to bladder neoplasm. Poorly defined bladder soft tissue density consistent with afforded history of bladder neoplasm  Appreciate Urology recs -c/w Rt PCN. Pt reports right NT drains poorly, flushed it couple of times overnight. Awaiting Urine culture results from NT and clear catch  Contacted IR to eval NT & possible tube exchange : pt requested anesthesia, tube exchange this FRIDAY   -MNNPO tonight   pt to f/u with Dr Trinidad at d/c to discuss new PET findings and further options for management Patient has past history of bladder mass  see hospital course  CT abdomen showing Severe end-stage chronic left hydroureteronephrosis without obstructing calculus presumably related to bladder neoplasm. Poorly defined bladder soft tissue density consistent with afforded history of bladder neoplasm  Appreciate Urology recs -c/w Rt PCN. Pt reports right NT drains poorly, flushed it couple of times overnight.   No growth urine culture   IR nephrostomy exchange 7/23 with anesthesia   MNNPO tonight , labs in AM, hold lovenox in AM ,  Negative COVID today

## 2021-07-22 NOTE — PROGRESS NOTE ADULT - SUBJECTIVE AND OBJECTIVE BOX
DAILY PROGRESS NOTE:         24 hr events:  eval by IR  plan for rt PCN tmrw     Objective:    Vital Signs Last 24 Hrs  T(C): 37 (22 Jul 2021 11:23), Max: 37 (22 Jul 2021 11:23)  T(F): 98.6 (22 Jul 2021 11:23), Max: 98.6 (22 Jul 2021 11:23)  HR: 79 (22 Jul 2021 11:23) (53 - 79)  BP: 120/68 (22 Jul 2021 11:23) (99/63 - 126/74)  BP(mean): --  RR: 14 (22 Jul 2021 11:23) (14 - 16)  SpO2: 98% (22 Jul 2021 11:23) (98% - 100%)    I&O's Detail    21 Jul 2021 07:01  -  22 Jul 2021 07:00  --------------------------------------------------------  IN:  Total IN: 0 mL    OUT:    Nephrostomy Tube (mL): 425 mL    Voided (mL): 475 mL  Total OUT: 900 mL    Total NET: -900 mL      22 Jul 2021 07:01  -  22 Jul 2021 17:13  --------------------------------------------------------  IN:  Total IN: 0 mL    OUT:    Voided (mL): 200 mL  Total OUT: 200 mL    Total NET: -200 mL          Physical Exam:    General: NAD, well-nourished  HEENT: Atraumatic, EOMI  Resp: Breathing comfortably on RA  CV: regular rate, no edema.   Ext: ROMIx4, motor strength intact x 4  : rt PCN draining   Psych: AOx3  Neuro: No focal deficits       Laboratory Results:                          14.5   4.51  )-----------( 211      ( 21 Jul 2021 07:11 )             44.4     07-21    137  |  102  |  35<H>  ----------------------------<  82  3.9   |  28  |  1.29    Ca    10.1      21 Jul 2021 07:11

## 2021-07-22 NOTE — DIETITIAN INITIAL EVALUATION ADULT. - PERTINENT LABORATORY DATA
07-21 Na137 mmol/L Glu 82 mg/dL K+ 3.9 mmol/L Cr  1.29 mg/dL BUN 35 mg/dL<H>   07-20 Phos 3.3 mg/dL   07-20 Alb 3.8 g/dL       07-20 Chol 243 mg/dL<H> LDL --    HDL 74 mg/dL Trig 71 mg/dL  07-22-21 @ 10:19 HgbA1C 5.7 [4.0 - 5.6]  07-20-21 @ 12:50 HgbA1C 5.5 [4.0 - 5.6]

## 2021-07-22 NOTE — PROGRESS NOTE ADULT - ASSESSMENT
55 year old female with PMHx of hypertension,  pacemaker for sick sinus syndrome ([employer] [place], ) bladder masses, questionable bladder cancer,  left-sided hydronephrosis and Rt PCN  presents to the ED with complaints of left-sided chest pain and left flank pain x2 weeks. Pt follows with Dr. Trinidad. Per  while patient was initially thought to have T1 disease in bladder, appearance on recent imaging may suggest T3. However, recently had PET on 7/16/21 demonstrating new FDG-avid left para-aortic LN in RP space, consistent w/ possible met. Urine culture results from  clear catch with no growth. IR consulted for possible PCT tube exchange and possible biopsy for Lt para- aortic LN, pt requested anesthesias for tube exchange so  IR planned for this Friday, pt declined bx.  55 year old female with PMHx of hypertension,  pacemaker for sick sinus syndrome ([employer] [place], ) bladder masses, questionable bladder cancer,  left-sided hydronephrosis and Rt PCN  presents to the ED with complaints of left-sided chest pain and left flank pain x2 weeks. Pt follows with Dr. Trinidad. Per  while patient was initially thought to have T1 disease in bladder, appearance on recent imaging may suggest T3. However, recently had PET on 7/16/21 demonstrating new FDG-avid left para-aortic LN in RP space, consistent w/ possible met. Urine culture results from  clear catch with no growth. IR consulted for possible PCT tube exchange and possible biopsy for Lt para- aortic LN, pt requested anesthesias for tube exchange so  IR planned for this Friday, pt declined bx. Pt reported that she had lymph node biopsy 1 year ago due to similar finding from out CT scan. At that time her biopsy was negative. She was f/u with hem/onc and [facility] about 2 yrs ago, started chemo which initially plan for 12 sessions, but only completed 3 sessions  and d/fernie as her f/u CT   improved per pt.  Rt nephrostomy tube exchange tomorrow with IR , MNNPO, blood work and COVID swab ordered. Continue ceftriaxone till 7/29 per ID .   55 year old female with PMHx of hypertension,  pacemaker for sick sinus syndrome ([employer] [place], ) bladder masses, questionable bladder cancer,  left-sided hydronephrosis and Rt percutaneous nephrostomy tube  presents to the ED with complaints of left-sided chest pain and left flank pain x2 weeks. Pt follows with Dr. Trinidad urologist.  Pt initially went to [facility] then f/u with [facility] for chemo tx. Pt got partial chemo tx as her repeat CT showed huge improvement per pt. So, pt asked to have more chemo but [facility] declined further chemo so she started f/u with Dr. Trinidad. Pt asked to have bladder biopsy but Dr. Trinidad suggested to have PET scan first which was done on 7/16 with result of para- aortic lymph nodes( pt can pull up her  EMR chart with PET scan image from her phone)    / Heme/onc /ID and IR consulted   -  while patient was initially thought to have T1 disease in bladder, appearance on recent imaging may suggest T3. However, recently had PET on 7/16/21 demonstrating new FDG-avid left para-aortic LN in RP space, consistent w/ possible met. Urine culture results from  clear catch with no growth. Suggested to have nephrostomy tube exchange and at this time, would not recommend bladder biopsy before LND biopsy as tisus dx of new LND focus would change treatment plan.    IR  - nephrostomy tube change 7/23. Will have a discussion if her LN is feasible for biopsy or Continue ceftriaxone till 7/29 per ID .      Heme/onc - Able to speak to Dr. Trinidad - Pt f/u with [facility] then [facility] where she got first chemo tx.  Pt presented as a T1 but likely understaged as she already had Lt hydro at that time. Molecular studies were not done. Pt had Lt retroperitoneal nodul biopsy in 10/2020 which was negative for malignancy. She was tx with neoadjuvant chemo x gem/cis for 2 cycles then d/fernie as she developed pyelo.Pt will f/u with medical oncology/ [facility] upon discharge.     ID - ceftriaxone till 7/29

## 2021-07-22 NOTE — DIETITIAN NUTRITION RISK NOTIFICATION - TREATMENT: THE FOLLOWING DIET HAS BEEN RECOMMENDED
Diet, NPO after Midnight:      NPO Start Date: 22-Jul-2021,   NPO Start Time: 23:59 (07-22-21 @ 10:05) [Active]  Diet, Regular:   DASH/TLC {Sodium & Cholesterol Restricted} (07-19-21 @ 22:33) [Active]

## 2021-07-22 NOTE — PROGRESS NOTE ADULT - PROBLEM SELECTOR PLAN 3
Patient c/o left chest pain with left flank pain   EKG showing sinus bradycardia, Lipid panel -chol 243,  (elevated)-->may consider starting Statins  Appreciate Dr Marlene lopez - pt had a recent Echo and Stress test in the office, was negative  Trop #1- neg, trop 2 -neg and D Dimer- 170 wnl  no further cardiac work up needed never biopsied in the past per pt  uro recc - at this time, would not recommend bladder biopsy before LND biopsy as tisus dx of new LND focus would change treatment plan.  heme/onc is following

## 2021-07-22 NOTE — PROGRESS NOTE ADULT - SUBJECTIVE AND OBJECTIVE BOX
Patient is seen and examined at the bed side, is afebrile.  She is tolerating Ceftriaxone okay. The IR follow up appreciated.      \REVIEW OF SYSTEMS: All other review systems are negative      ALLERGIES: No Known Allergies      Vital Signs Last 24 Hrs  T(C): 37 (2021 11:23), Max: 37 (2021 11:23)  T(F): 98.6 (2021 11:23), Max: 98.6 (2021 11:23)  HR: 79 (2021 11:23) (53 - 79)  BP: 120/68 (2021 11:23) (99/63 - 126/74)  BP(mean): --  RR: 14 (2021 11:23) (14 - 16)  SpO2: 98% (2021 11:23) (98% - 100%)      PHYSICAL EXAM:  GENERAL: Not in distress   CHEST/LUNG: Not using accessory muscles   HEART: s1 and s2 present  ABDOMEN:  Nontender and  Nondistended  : Right PCN in placed, draining yellow color urine  EXTREMITIES: No pedal  edema  CNS: Awake and Alert      LABS: No new Labs                                   14.5   4.51  )-----------( 211      ( 2021 07:11 )             44.4                13.6   4.52  )-----------( 214      ( 2021 08:18 )             41.2       07-21    137  |  102  |  35<H>  ----------------------------<  82  3.9   |  28  |  1.29    Ca    10.1      2021 07:11  Phos  3.3     07-20  Mg     2.3     07-20    TPro  7.8  /  Alb  3.8  /  TBili  0.6  /  DBili  x   /  AST  18  /  ALT  16  /  AlkPhos  93  07-20    07-20    139  |  104  |  24<H>  ----------------------------<  73  3.9   |  25  |  1.10    Ca    9.9      2021 08:18  Phos  3.3     07-20  Mg     2.3     07-20    TPro  7.8  /  Alb  3.8  /  TBili  0.6  /  DBili  x   /  AST  18  /  ALT  16  /  AlkPhos  93  -      Urinalysis Basic - ( 2021 16:19 )  Color: Yellow / Appearance: very cloudy / S.015 / pH: x  Gluc: x / Ketone: Negative  / Bili: Negative / Urobili: Negative   Blood: x / Protein: 500 mg/dL / Nitrite: Negative   Leuk Esterase: Small / RBC: 25-50 /HPF / WBC 26-50 /HPF   Sq Epi: x / Non Sq Epi: Few /HPF / Bacteria: Moderate /HPF        MEDICATIONS  (STANDING):    cefTRIAXone   IVPB      cefTRIAXone   IVPB 1000 milliGRAM(s) IV Intermittent every 24 hours  enoxaparin Injectable 40 milliGRAM(s) SubCutaneous daily  polyethylene glycol 3350 17 Gram(s) Oral daily  senna 2 Tablet(s) Oral at bedtime      RADIOLOGY & ADDITIONAL TESTS:     CT Abdomen and Pelvis No Cont (21 @ 17:18) Significantly limited by lack of IV contrast.  Severe end-stage chronic left hydroureteronephrosis without obstructing calculus presumably related to bladder neoplasm.  Poorly defined bladder soft tissue density consistent with afforded history of bladder neoplasm.      MICROBIOLOGY DATA:    Culture - Urine (21 @ 22:16)   Specimen Source: Clean Catch Clean Catch (Midstream)   Culture Results: <10,000 CFU/mL Normal Urogenital Vandana     COVID-19 PCR . (21 @ 19:33)   COVID-19 PCR: NotDetec:              Patient is seen and examined at the bed side, is afebrile.  She is tolerating Ceftriaxone okay. The IR evaluation appreciated.      \REVIEW OF SYSTEMS: All other review systems are negative      ALLERGIES: No Known Allergies      Vital Signs Last 24 Hrs  T(C): 37 (2021 11:23), Max: 37 (2021 11:23)  T(F): 98.6 (2021 11:23), Max: 98.6 (2021 11:23)  HR: 79 (2021 11:23) (53 - 79)  BP: 120/68 (2021 11:23) (99/63 - 126/74)  BP(mean): --  RR: 14 (2021 11:23) (14 - 16)  SpO2: 98% (2021 11:23) (98% - 100%)      PHYSICAL EXAM:  GENERAL: Not in distress   CHEST/LUNG: Not using accessory muscles   HEART: s1 and s2 present  ABDOMEN:  Nontender and  Nondistended  : Right PCN in placed, draining yellow color urine  EXTREMITIES: No pedal  edema  CNS: Awake and Alert      LABS: No new Labs                                   14.5   4.51  )-----------( 211      ( 2021 07:11 )             44.4                13.6   4.52  )-----------( 214      ( 2021 08:18 )             41.2       07-21    137  |  102  |  35<H>  ----------------------------<  82  3.9   |  28  |  1.29    Ca    10.1      2021 07:11  Phos  3.3     07-20  Mg     2.3     07-20    TPro  7.8  /  Alb  3.8  /  TBili  0.6  /  DBili  x   /  AST  18  /  ALT  16  /  AlkPhos  93  07-20    07-20    139  |  104  |  24<H>  ----------------------------<  73  3.9   |  25  |  1.10    Ca    9.9      2021 08:18  Phos  3.3     07-20  Mg     2.3     07-20    TPro  7.8  /  Alb  3.8  /  TBili  0.6  /  DBili  x   /  AST  18  /  ALT  16  /  AlkPhos  93  07-20      Urinalysis Basic - ( 2021 16:19 )  Color: Yellow / Appearance: very cloudy / S.015 / pH: x  Gluc: x / Ketone: Negative  / Bili: Negative / Urobili: Negative   Blood: x / Protein: 500 mg/dL / Nitrite: Negative   Leuk Esterase: Small / RBC: 25-50 /HPF / WBC 26-50 /HPF   Sq Epi: x / Non Sq Epi: Few /HPF / Bacteria: Moderate /HPF        MEDICATIONS  (STANDING):    cefTRIAXone   IVPB      cefTRIAXone   IVPB 1000 milliGRAM(s) IV Intermittent every 24 hours  enoxaparin Injectable 40 milliGRAM(s) SubCutaneous daily  polyethylene glycol 3350 17 Gram(s) Oral daily  senna 2 Tablet(s) Oral at bedtime      RADIOLOGY & ADDITIONAL TESTS:     CT Abdomen and Pelvis No Cont (21 @ 17:18) Significantly limited by lack of IV contrast.  Severe end-stage chronic left hydroureteronephrosis without obstructing calculus presumably related to bladder neoplasm.  Poorly defined bladder soft tissue density consistent with afforded history of bladder neoplasm.      MICROBIOLOGY DATA:    Culture - Urine (21 @ 22:16)   Specimen Source: Clean Catch Clean Catch (Midstream)   Culture Results: <10,000 CFU/mL Normal Urogenital Vandana     COVID-19 PCR . (21 @ 19:33)   COVID-19 PCR: NotDetec:

## 2021-07-22 NOTE — PROGRESS NOTE ADULT - SUBJECTIVE AND OBJECTIVE BOX
Patient for right nephrostomy tube change in IR tomorrow.  Please keep patient NPO, send early AM labs including CBC, BMP, and PT, INR / PTT.  Hold all anticoagulation. Please send new Covid PCR today.

## 2021-07-22 NOTE — DIETITIAN INITIAL EVALUATION ADULT. - OTHER INFO
Pt visited.  Pt Reports eating Good. Pt appears thin and Cachectic. Pt  states her  lb but in 2020 she weigh 80 Lb and then she started gaining wt to 98-99 lb at present. Pt with Bladder Ca. Pt with R Nephrostomy tube in feb 2021.  NFPE  performed, Pt with severe Muscle mass  Loss ( Temporal and clavicles) . Pt offered Nutritional supplement Dislikes Ensure enlive. Offered Glucerna shakes agreed to try. Also Offered  Large Portions of Protein. Pt agreed to try. Per Pt Ht 5 feet 4 inches, Current wt 98 LB. BMI 16.8. Pt visited.  Pt Reports eating Good. Pt appears thin and Cachectic. Pt  states her  lb but in 2020 she weigh 80 Lb and then she started gaining wt to 98-99 lb at present. Pt with Bladder Ca. Pt with R Nephrostomy tube in feb 2021.  NFPE  performed, Pt with severe Muscle mass  Loss ( Temporal and clavicles) . Pt offered Nutritional supplement Dislikes Ensure enlive. Offered Glucerna shakes agreed to try. Also Offered  Large Portions of Protein. Pt agreed to try. Per Pt Ht 5 feet 4 inches, Current wt 98 LB. BMI 16.8. Pt provided with Diet education on oncology cooking Tips . Pt receptive Pt visited.  Pt Reports eating Fair.  Observed post lunch meal pt ate ~75 % of protein. Pt appears thin and Cachectic. Pt  states her  lb but in 2020 she weigh 80 Lb and then she started gaining wt to 98-99 lb at present. Pt with Bladder Ca. Pt with R Nephrostomy tube in feb 2021.  NFPE  performed, Pt with severe Muscle mass  Loss ( Temporal and clavicles) . Pt offered Nutritional supplement Dislikes Ensure enlive. Offered Glucerna shakes agreed to try. Also Offered  Large Portions of Protein. Pt agreed to try. Per Pt Ht 5 feet 4 inches, Current wt 98 LB. BMI 16.8. Pt provided with Diet education on oncology cooking Tips . Pt receptive

## 2021-07-22 NOTE — DIETITIAN INITIAL EVALUATION ADULT. - PERTINENT MEDS FT
MEDICATIONS:  acetaminophen   Tablet .. 650 every 4 hours PRN  cefTRIAXone   IVPB    cefTRIAXone   IVPB 1000 every 24 hours  enoxaparin Injectable 40 daily  polyethylene glycol 3350 17 daily  senna 2 at bedtime  traMADol 50 every 6 hours PRN  traMADol 75 every 6 hours PRN

## 2021-07-22 NOTE — DIETITIAN INITIAL EVALUATION ADULT. - SIGNS/SYMPTOMS
BMI 16.8, BUN 35, Weight loss 27 lbs x 1 year ( 22 %% ), Bladder ca BMI 16.8, BUN 35, Weight loss 27 lbs x 1 year ( 22 %% ), Bladder ca, po intake ~50% of meal.

## 2021-07-22 NOTE — PROGRESS NOTE ADULT - PROBLEM SELECTOR PLAN 10
PCN tube exchange this Friday   pending ID d/c recc PCN tube exchange this Friday   Ceftriaxone till 7/29 if can go home on PO ABT, pt will d/c home and f/u with own heme/onc and urology PCN tube exchange this Friday   Ceftriaxone till 7/29 if can go home on PO ABT, pt will d/c home and f/u with own heme/onc and urology  pending decision for bx

## 2021-07-22 NOTE — PROGRESS NOTE ADULT - SUBJECTIVE AND OBJECTIVE BOX
NP Note discussed with  Primary Attending    Patient is a 55y old  Female who presents with a chief complaint of Lt flank  and chest pain (22 Jul 2021 11:41)      INTERVAL HPI/OVERNIGHT EVENTS: no new complaints    MEDICATIONS  (STANDING):  cefTRIAXone   IVPB      cefTRIAXone   IVPB 1000 milliGRAM(s) IV Intermittent every 24 hours  enoxaparin Injectable 40 milliGRAM(s) SubCutaneous daily  polyethylene glycol 3350 17 Gram(s) Oral daily  senna 2 Tablet(s) Oral at bedtime    MEDICATIONS  (PRN):  acetaminophen   Tablet .. 650 milliGRAM(s) Oral every 4 hours PRN Mild Pain (1 - 3)  traMADol 50 milliGRAM(s) Oral every 6 hours PRN Moderate Pain (4 - 6)  traMADol 75 milliGRAM(s) Oral every 6 hours PRN Severe Pain (7 - 10)      __________________________________________________  REVIEW OF SYSTEMS:    CONSTITUTIONAL: No fever,   EYES: no acute visual disturbances  NECK: No pain or stiffness  RESPIRATORY: No cough; No shortness of breath  CARDIOVASCULAR: No chest pain, no palpitations  GASTROINTESTINAL: No pain. No nausea or vomiting; No diarrhea   NEUROLOGICAL: No headache or numbness, no tremors  MUSCULOSKELETAL: No joint pain, no muscle pain  GENITOURINARY: no dysuria, no frequency, no hesitancy  PSYCHIATRY: no depression , no anxiety  ALL OTHER  ROS negative        Vital Signs Last 24 Hrs  T(C): 37 (22 Jul 2021 11:23), Max: 37 (22 Jul 2021 11:23)  T(F): 98.6 (22 Jul 2021 11:23), Max: 98.6 (22 Jul 2021 11:23)  HR: 79 (22 Jul 2021 11:23) (53 - 79)  BP: 120/68 (22 Jul 2021 11:23) (99/63 - 126/74)  BP(mean): --  RR: 14 (22 Jul 2021 11:23) (14 - 16)  SpO2: 98% (22 Jul 2021 11:23) (98% - 100%)    ________________________________________________  PHYSICAL EXAM:  GENERAL: NAD  HEENT: Normocephalic;  conjunctivae and sclerae clear; moist mucous membranes;   NECK : supple  CHEST/LUNG: Clear to auscultation bilaterally with good air entry   HEART: S1 S2  regular; no murmurs, gallops or rubs  ABDOMEN: Soft, Nontender, Nondistended; Bowel sounds present  EXTREMITIES: no cyanosis; no edema; no calf tenderness  SKIN: warm and dry; no rash  NERVOUS SYSTEM:  Awake and alert; Oriented  to place, person and time ; no new deficits    _________________________________________________  LABS:                        14.5   4.51  )-----------( 211      ( 21 Jul 2021 07:11 )             44.4     07-21    137  |  102  |  35<H>  ----------------------------<  82  3.9   |  28  |  1.29    Ca    10.1      21 Jul 2021 07:11          CAPILLARY BLOOD GLUCOSE      POCT Blood Glucose.: 105 mg/dL (21 Jul 2021 22:02)        RADIOLOGY & ADDITIONAL TESTS:    Imaging Personally Reviewed:  YES/NO    Consultant(s) Notes Reviewed:   YES/ No    Care Discussed with Consultants :     Plan of care was discussed with patient and /or primary care giver; all questions and concerns were addressed and care was aligned with patient's wishes.     NP Note discussed with  Primary Attending    Patient is a 55y old  Female who presents with a chief complaint of Lt flank  and chest pain (22 Jul 2021 11:41)      INTERVAL HPI/OVERNIGHT EVENTS: no new complaints    MEDICATIONS  (STANDING):  cefTRIAXone   IVPB      cefTRIAXone   IVPB 1000 milliGRAM(s) IV Intermittent every 24 hours  enoxaparin Injectable 40 milliGRAM(s) SubCutaneous daily  polyethylene glycol 3350 17 Gram(s) Oral daily  senna 2 Tablet(s) Oral at bedtime    MEDICATIONS  (PRN):  acetaminophen   Tablet .. 650 milliGRAM(s) Oral every 4 hours PRN Mild Pain (1 - 3)  traMADol 50 milliGRAM(s) Oral every 6 hours PRN Moderate Pain (4 - 6)  traMADol 75 milliGRAM(s) Oral every 6 hours PRN Severe Pain (7 - 10)      __________________________________________________  REVIEW OF SYSTEMS:    CONSTITUTIONAL: No fever,   EYES: no acute visual disturbances  NECK: No pain or stiffness  RESPIRATORY: No cough; No shortness of breath  CARDIOVASCULAR: No chest pain, no palpitations  GASTROINTESTINAL: No pain. No nausea or vomiting; No diarrhea   NEUROLOGICAL: No headache or numbness, no tremors  MUSCULOSKELETAL: No joint pain, no muscle pain  GENITOURINARY: no dysuria, no frequency, no hesitancy  PSYCHIATRY: no depression , no anxiety  ALL OTHER  ROS negative        Vital Signs Last 24 Hrs  T(C): 37 (22 Jul 2021 11:23), Max: 37 (22 Jul 2021 11:23)  T(F): 98.6 (22 Jul 2021 11:23), Max: 98.6 (22 Jul 2021 11:23)  HR: 79 (22 Jul 2021 11:23) (53 - 79)  BP: 120/68 (22 Jul 2021 11:23) (99/63 - 126/74)  BP(mean): --  RR: 14 (22 Jul 2021 11:23) (14 - 16)  SpO2: 98% (22 Jul 2021 11:23) (98% - 100%)    ________________________________________________  PHYSICAL EXAM:  GENERAL: NAD,   HEENT: Normocephalic;  conjunctivae and sclerae clear; moist mucous membranes;   NECK : supple  CHEST/LUNG: Clear to auscultation bilaterally with good air entry   HEART: S1 S2  regular; no murmurs, gallops or rubs  ABDOMEN: Soft, Nontender, Nondistended; Bowel sounds present Rt nephrostomy tube   EXTREMITIES: no cyanosis; no edema; no calf tenderness  SKIN: warm and dry; no rash  NERVOUS SYSTEM:  Awake and alert; Oriented  to place, person and time ; no new deficits    _________________________________________________  LABS:                        14.5   4.51  )-----------( 211      ( 21 Jul 2021 07:11 )             44.4     07-21    137  |  102  |  35<H>  ----------------------------<  82  3.9   |  28  |  1.29    Ca    10.1      21 Jul 2021 07:11          CAPILLARY BLOOD GLUCOSE      POCT Blood Glucose.: 105 mg/dL (21 Jul 2021 22:02)        RADIOLOGY & ADDITIONAL TESTS:  < from: CT Abdomen and Pelvis No Cont (07.19.21 @ 17:18) >    EXAM:  CT ABDOMEN AND PELVIS                            PROCEDURE DATE:  07/19/2021          INTERPRETATION:  CLINICAL INFORMATION: Left flank pain. Antecedent history of bladder cancer    COMPARISON: 8/17/2019    CONTRAST/COMPLICATIONS:  IV Contrast: NONE  Oral Contrast: NONE  Complications: None reported at time of study completion    PROCEDURE:  CT of the Abdomen and Pelvis was performed.  Sagittal and coronal reformats were performed.  Evaluation solid organ parenchyma and urinary tract significantly limited by lack of IV contrast.  FINDINGS:  LOWER CHEST: In situ cardiac pacer. Clear lung bases..    LIVER: Within normal limits.  BILE DUCTS: Normal caliber.  GALLBLADDER: Within normal limits.  SPLEEN: Within normal limits.  PANCREAS: Within normal limits.  ADRENALS: Within normal limits.  KIDNEYS/URETERS: Severe end-stage chronic left hydroureteronephrosis with marked thinning of the cortical mantle, worsened compared to prior. No obstructing calculus. Right nephrostomy catheter in situ. No hydronephrosis or urolithiasis on the right.    BLADDER: Extensive poorly defined lobulated soft tissue in the bladder consistent with the history of bladder neoplasm. Associated hematoma difficult to exclude.  REPRODUCTIVE ORGANS: No gynecologic mass    BOWEL: No bowel obstruction. Appendix no appendicitis  PERITONEUM: No ascites.  VESSELS: Nonaneurysmal.  RETROPERITONEUM/LYMPH NODES: No lymphadenopathy.  ABDOMINAL WALL: Within normal limits.  BONES: No acute or aggressive pathology.    IMPRESSION:  Significantly limited by lack of IV contrast.  Severe end-stage chronic left hydroureteronephrosis without obstructing calculus presumably related to bladder neoplasm.  Poorly defined bladder soft tissue density consistent with afforded history of bladder neoplasm.        --- End of Report ---            GERA PIKE MD; Attending Radiologist  This document has been electronically signed. Jul 19 2021  5:41PM    < end of copied text >    Imaging Personally Reviewed:  YES    Consultant(s) Notes Reviewed:   YES    Care Discussed with Consultants : ID/ urology/ IR /heme/onc     Plan of care was discussed with patient and /or primary care giver; all questions and concerns were addressed and care was aligned with patient's wishes.

## 2021-07-22 NOTE — PROGRESS NOTE ADULT - PROBLEM SELECTOR PLAN 4
Patient has past history of bladder mass.   CT abdomen showing Severe end-stage chronic left hydroureteronephrosis without obstructing calculus presumably related to bladder neoplasm. Poorly defined bladder soft tissue density consistent with afforded history of bladder neoplasm.  Appreciate Urology recs - pending IR to serafin BAUER & possible tube exchange  [facility] consulted ( Zoë)- team Left message for Dr Trinidad to obtain more details about Dx/path, staging, tx etc  pt has not followed up with [facility] and would like to f/u in the office for Medical Oncology. Prognosis/Tx pending addt'l information - Zoë TINOCO left msg to pt's outpt oncology per her out pt PET scan from 7/16   para aortic lymph nodes  with  hx of bladder mass  - urology/ hemeonc recommends LN biopsy if site is feasible for IR

## 2021-07-22 NOTE — PROGRESS NOTE ADULT - ASSESSMENT
_________________________________________________________________________________________  ========>>  M E D I C A L   A T T E N D I N G    F O L L O W  U P  N O T E  <<=========  -----------------------------------------------------------------------------------------------------    - Patient seen and examined by me earlier today.  pt seen together at bedside with IR team..   - In summary,  JEF HOUSE is a 55y year old woman admitted with Lft flank pain   - Patient today overall doing ok, comfortable, eating fairly  overall feels ok    ==================>> REVIEW OF SYSTEM <<=================    GEN: no fever, no chills, pain in left  flank on and off  > improved   RESP: no SOB, no cough, no sputum  CVS: no chest pain, no palpitations, no edema  GI: no abdominal pain, no nausea  : no dysuria, no frequency, no hematuria reported    Neuro: no headache, no dizziness  Derm : no itching, no rash    ==================>> PHYSICAL EXAM <<=================    GEN: A&O X 3 , NAD , comfortable, pleasant, calm , cachectic , more alert and interactive / energetic   HEENT: NCAT, PERRL, MMM, hearing intact  Neck: supple , no JVD appreciated  CVS: S1S2 , regular , No M/R/G appreciated  PULM: CTA B/L,  no W/R/R appreciated  ABD.: soft. non tender, non distended,  bowel sounds present  Extrem: intact pulses , no edema     nephrostomy to bag with yellow urine   PSYCH : normal mood,  not anxious                             ( Note written / Date of service 07-22-21 )    ==================>> MEDICATIONS <<====================    cefTRIAXone   IVPB      cefTRIAXone   IVPB 1000 milliGRAM(s) IV Intermittent every 24 hours  enoxaparin Injectable 40 milliGRAM(s) SubCutaneous daily  polyethylene glycol 3350 17 Gram(s) Oral daily  senna 2 Tablet(s) Oral at bedtime    MEDICATIONS  (PRN):  acetaminophen   Tablet .. 650 milliGRAM(s) Oral every 4 hours PRN Mild Pain (1 - 3)  traMADol 50 milliGRAM(s) Oral every 6 hours PRN Moderate Pain (4 - 6)  traMADol 75 milliGRAM(s) Oral every 6 hours PRN Severe Pain (7 - 10)    ___________  Active diet:  Diet, NPO after Midnight:      NPO Start Date: 22-Jul-2021,   NPO Start Time: 23:59  Diet, Regular:   DASH/TLC Sodium & Cholesterol Restricted  ___________________    ==================>> VITAL SIGNS <<==================    Vital Signs Last 24 HrsT(C): 37 (07-22-21 @ 11:23)  T(F): 98.6 (07-22-21 @ 11:23), Max: 98.6 (07-22-21 @ 11:23)  HR: 79 (07-22-21 @ 11:23) (53 - 79)  BP: 120/68 (07-22-21 @ 11:23)  RR: 14 (07-22-21 @ 11:23) (14 - 16)  SpO2: 98% (07-22-21 @ 11:23) (98% - 100%)      POCT Blood Glucose.: 105 mg/dL (21 Jul 2021 22:02)     ==================>> LAB AND IMAGING <<==================                        14.5   4.51  )-----------( 211      ( 21 Jul 2021 07:11 )             44.4        07-21    137  |  102  |  35<H>  ----------------------------<  82  3.9   |  28  |  1.29    Ca    10.1      21 Jul 2021 07:11      WBC count:   4.51 <<== ,  4.52 <<== ,  5.23 <<==   Hemoglobin:   14.5 <<==,  13.6 <<==,  13.4 <<==  platelets:  211 <==, 214 <==, 207 <==    Creatinine:  1.29  <<==, 1.10  <<==, 1.17  <<==  Sodium:   137  <==, 139  <==, 137  <==       AST:          18 <== , 25 <==      ALT:        16  <== , 17  <==      AP:        93  <=, 91  <=     Bili:        0.6  <=, 0.4  <=    ___________________________________________________________________________________  ===============>>  A S S E S S M E N T   A N D   P L A N <<===============  ------------------------------------------------------------------------------------------    · Assessment	  55 year old female with PMHx of hypertension,  pacemaker for sick sinus syndrome ([place], ) bladder masses, questionable bladder cancer,  left-sided hydronephrosis and Rt PCN  presents to the ED with complaints of left-sided chest pain and left flank pain x2 weeks. Pt follows with Dr. Trinidad. Per  while patient was initially thought to have T1 disease in bladder, appearance on recent imaging may suggest T3. However, recently had PET on 7/16/21 demonstrating new FDG-avid left para-aortic LN in RP space, consistent w/ possible met    Problem/Plan - 1:  ·  Problem: complex UTI and Pyelonephritis.    - Patient presented with left flank pain. ( though left flank pain likely due to cysts and not pyelo)   - follow cultures from urine and nephrostomy  - Appreciate Urology and IR follow up   - Appreciate ID follow up  - for tube check / change tomorrow     Problem/Plan - 2:  ·  Problem: Nephrostomy status.  Plan: - Patient has past history of bladder mass.   - CT abdomen showing Severe end-stage chronic left hydroureteronephrosis without obstructing calculus presumably related to bladder neoplasm. Poorly defined bladder soft tissue density consistent with afforded history of bladder neoplasm.  - Appreciate Urology recs - Continue w/ Rt PCN. >> shayne for IR for tube change / exchange Friday with anesthesia ( per pt preference)        left kidney no plans for any intervention as no expectation for recovery as noted and discussed   -  and other providers have discussed with pt's Uro: Dr. Trinidad >> trying to get report of PET scan for possible Bx of RP lymph node >> plan for IR to review PET and plan for possible LN Bx     Problem/Plan - 3:  ·  Problem: Chest pain.  Plan: - Patient c/o left chest pain with left flank pain   - EKG showing sinus bradycardia  - Dr Rosario consulted- pt had a recent echo and stress test in Dr Rosario office was negative  - less likely cardiac as above     D-Dimer Assay, Quantitative: 170 ng/mL DDU (07.20.21 @ 08:18)  - no further cardiac work up needed.     Problem/Plan - 4:  ·  Problem: Bladder mass.    as above  - A oncology group on board   awaiting PET..     Problem/Plan - 5:  ·  Problem: HTN   - Patient has past  hsitory of HTN controlled on diet.    Problem/Plan - 6:  ·  Problem: Asthma.    - Patient has past history of asthma not on any meds and well controlled     GI/ DVT prophylaxis   --------------------------------------------  Case discussed with ALISSA chaidez.   Education given on findings and plan of care  ___________________________  H. NIEVES Ochoa.  Pager: [phone]

## 2021-07-22 NOTE — PROGRESS NOTE ADULT - SUBJECTIVE AND OBJECTIVE BOX
CARDIOLOGY     PROGRESS  NOTE   ________________________________________________    CHIEF COMPLAINT:Patient is a 55y old  Female who presents with a chief complaint of Lt flank  and chest pain (21 Jul 2021 16:18)  doing better.  	  REVIEW OF SYSTEMS:  CONSTITUTIONAL: No fever, weight loss, or fatigue  EYES: No eye pain, visual disturbances, or discharge  ENT:  No difficulty hearing, tinnitus, vertigo; No sinus or throat pain  NECK: No pain or stiffness  RESPIRATORY: No cough, wheezing, chills or hemoptysis; No Shortness of Breath  CARDIOVASCULAR: No chest pain, palpitations, passing out, dizziness, or leg swelling  GASTROINTESTINAL: No abdominal or epigastric pain. No nausea, vomiting, or hematemesis; No diarrhea or constipation. No melena or hematochezia.  GENITOURINARY: No dysuria, frequency, hematuria, or incontinence  NEUROLOGICAL: No headaches, memory loss, loss of strength, numbness, or tremors  SKIN: No itching, burning, rashes, or lesions   LYMPH Nodes: No enlarged glands  ENDOCRINE: No heat or cold intolerance; No hair loss  MUSCULOSKELETAL: No joint pain or swelling; No muscle, back, or extremity pain  PSYCHIATRIC: No depression, anxiety, mood swings, or difficulty sleeping  HEME/LYMPH: No easy bruising, or bleeding gums  ALLERGY AND IMMUNOLOGIC: No hives or eczema	    [ ] All others negative	  [ ] Unable to obtain    PHYSICAL EXAM:  T(C): 36.2 (07-22-21 @ 05:37), Max: 36.6 (07-21-21 @ 11:38)  HR: 53 (07-22-21 @ 05:37) (53 - 75)  BP: 126/74 (07-22-21 @ 05:37) (99/63 - 126/74)  RR: 16 (07-22-21 @ 05:37) (14 - 16)  SpO2: 100% (07-22-21 @ 05:37) (98% - 100%)  Wt(kg): --  I&O's Summary    21 Jul 2021 07:01  -  22 Jul 2021 07:00  --------------------------------------------------------  IN: 0 mL / OUT: 900 mL / NET: -900 mL        Appearance: Normal	  HEENT:   Normal oral mucosa, PERRL, EOMI	  Lymphatic: No lymphadenopathy  Cardiovascular: Normal S1 S2, No JVD, + murmurs, No edema  Respiratory: Lungs clear to auscultation	  Psychiatry: A & O x 3, Mood & affect appropriate  Gastrointestinal:  Soft, Non-tender, + BS	  Skin: No rashes, No ecchymoses, No cyanosis	  Neurologic: Non-focal  Extremities: Normal range of motion, No clubbing, cyanosis or edema  Vascular: Peripheral pulses palpable 2+ bilaterally    MEDICATIONS  (STANDING):  cefTRIAXone   IVPB      cefTRIAXone   IVPB 1000 milliGRAM(s) IV Intermittent every 24 hours  enoxaparin Injectable 40 milliGRAM(s) SubCutaneous daily  polyethylene glycol 3350 17 Gram(s) Oral daily  senna 2 Tablet(s) Oral at bedtime      TELEMETRY: 	    ECG:  	  RADIOLOGY:  OTHER: 	  	  LABS:	 	    CARDIAC MARKERS:                                14.5   4.51  )-----------( 211      ( 21 Jul 2021 07:11 )             44.4     07-21    137  |  102  |  35<H>  ----------------------------<  82  3.9   |  28  |  1.29    Ca    10.1      21 Jul 2021 07:11      proBNP:   Lipid Profile: Cholesterol 243  LDL --  HDL 74  TG 71    HgA1c:   TSH: Thyroid Stimulating Hormone, Serum: 0.65 uU/mL (07-20 @ 08:18)    --Per pt right PCN draining poorly. Recommend IR for tube exchange  --Discussed case w/ Dr Trinidad (outpatient urologist). Given findings on PET, recommends IR biopsy of left para-aortic node as seen on scan. C/s IR for evaluation for rosa biopsy       Assessment and plan  ---------------------------  55 year old female with PMHx of hypertension, bladder masses, questionable bladder cancer, and left-sided hydronephrosis presents to the ED with complaints of left-sided chest pain and left flank pain x2 weeks. Patient states that she has been feeling left-sided aching chest pain wrapping around to her flank and down the left side of her abdomen. Patient reports that she has history of similar symptoms in the past. Patient additionally endorses some hematuria. Patient states that she was recently diagnosed with a UTI last month, and states that she was treated for pyelonephritis and completed her full course of antibiotics. Patient otherwise denies any fever, chills, shortness of breath, cough, hemoptysis, and all other acute complaints  pt is a 56 yo well known to me with hx of bradycardia/ sss, s/p ppm now  with chest pain.  check cardiac enzymes  tsh  check d dimer r/o PE  pt had a recent echo and stress test in my office was negative, will review  office records checked normal echo and stress test  continue abx as per ID  ,fu cultures  add lipitor  transfer to [facility] if can not be done in [facility]  abx

## 2021-07-22 NOTE — PROGRESS NOTE ADULT - PROBLEM SELECTOR PLAN 1
Patient presented with left flank pain. Patient afebrile, no WBCs  CT abdomen findings reviewed showing Severe end-stage chronic left hydroureteronephrosis without obstructing calculus presumably related to bladder neoplasm. Poorly defined bladder soft tissue density consistent with afforded history of bladder neoplasm  UA weakly positive, urine Cx  with no growth   Appreciate Urology recs- As Cr is stable and pt does not appear to have an obstructive pyelonephritis, no acute intervention is needed  Appreciate ID Dr Jennifer lopez- changed Rocephin to Cefepime 1g q8 at present D2 Patient presented with left flank pain. Patient afebrile, no WBCs  CT abdomen findings reviewed showing Severe end-stage chronic left hydroureteronephrosis without obstructing calculus presumably related to bladder neoplasm. Poorly defined bladder soft tissue density consistent with afforded history of bladder neoplasm  UA weakly positive, urine Cx  with no growth   Appreciate Urology recs- As Cr is stable and pt does not appear to have an obstructive pyelonephritis, no acute intervention is needed  Appreciate ID Dr Rodriguez recs- changed Rocephin to Cefepime 1g q8  then d/fernie as pt could not tolerate it .  ceftriaxone till 7/29 per ID Dr. Rodriguez

## 2021-07-22 NOTE — PROGRESS NOTE ADULT - ASSESSMENT
Patient is a 55y old  Female with PMHx of hypertension, bladder masses, questionable bladder cancer, and left-sided hydronephrosis presents to the ER for evaluation of left-sided chest pain and left flank pain x2 weeks. Patient states that she has been feeling left-sided aching chest pain wrapping around to her flank and down the left side of her abdomen. Patient reports that she has history of similar symptoms in the past.  Sterling was recently diagnosed with a UTI last month, and states that she was treated for pyelonephritis and completed her full course of antibiotics. ON admission, she has no fever or Leukocytosis but positive urine analysis. The CT abd/pelvis shows Severe end-stage chronic left hydroureteronephrosis without obstructing calculus presumably related to bladder neoplasm. She has started on Ceftriaxone and the ID consult requested to assist with further evaluation and antibiotic management.        #Complicated  UTI  - in the setting of Right PCN  # Chronic left hydroureteronephrosis without obstructing calculus   # Bladder cancer    would recommend:    1. NPO after midnight for IR guided Right nephrostomy tube change in AM  2. Continue Ceftriaxone 1 g daily, until 7/29/21  3. Monitor  kidney function   4. Pain management as needed     d/w patient, covering NP, Jin  and Nursing staff    Attending Attestation:    Spent more than 35 minutes on total encounter, more than 50 % of the visit was spent counseling and/or coordinating care by the Attending physician.

## 2021-07-23 LAB
ANION GAP SERPL CALC-SCNC: 5 MMOL/L — SIGNIFICANT CHANGE UP (ref 5–17)
APTT BLD: 29 SEC — SIGNIFICANT CHANGE UP (ref 27.5–35.5)
BUN SERPL-MCNC: 34 MG/DL — HIGH (ref 7–18)
CALCIUM SERPL-MCNC: 10.4 MG/DL — SIGNIFICANT CHANGE UP (ref 8.4–10.5)
CHLORIDE SERPL-SCNC: 105 MMOL/L — SIGNIFICANT CHANGE UP (ref 96–108)
CO2 SERPL-SCNC: 29 MMOL/L — SIGNIFICANT CHANGE UP (ref 22–31)
CREAT SERPL-MCNC: 1.2 MG/DL — SIGNIFICANT CHANGE UP (ref 0.5–1.3)
GLUCOSE BLDC GLUCOMTR-MCNC: 73 MG/DL — SIGNIFICANT CHANGE UP (ref 70–99)
GLUCOSE SERPL-MCNC: 78 MG/DL — SIGNIFICANT CHANGE UP (ref 70–99)
HCT VFR BLD CALC: 45.7 % — HIGH (ref 34.5–45)
HGB BLD-MCNC: 15 G/DL — SIGNIFICANT CHANGE UP (ref 11.5–15.5)
INR BLD: 1.06 RATIO — SIGNIFICANT CHANGE UP (ref 0.88–1.16)
MCHC RBC-ENTMCNC: 29.7 PG — SIGNIFICANT CHANGE UP (ref 27–34)
MCHC RBC-ENTMCNC: 32.8 GM/DL — SIGNIFICANT CHANGE UP (ref 32–36)
MCV RBC AUTO: 90.5 FL — SIGNIFICANT CHANGE UP (ref 80–100)
MRSA PCR RESULT.: SIGNIFICANT CHANGE UP
NRBC # BLD: 0 /100 WBCS — SIGNIFICANT CHANGE UP (ref 0–0)
PLATELET # BLD AUTO: 225 K/UL — SIGNIFICANT CHANGE UP (ref 150–400)
POTASSIUM SERPL-MCNC: 4.6 MMOL/L — SIGNIFICANT CHANGE UP (ref 3.5–5.3)
POTASSIUM SERPL-SCNC: 4.6 MMOL/L — SIGNIFICANT CHANGE UP (ref 3.5–5.3)
PROTHROM AB SERPL-ACNC: 12.6 SEC — SIGNIFICANT CHANGE UP (ref 10.6–13.6)
RBC # BLD: 5.05 M/UL — SIGNIFICANT CHANGE UP (ref 3.8–5.2)
RBC # FLD: 12.8 % — SIGNIFICANT CHANGE UP (ref 10.3–14.5)
S AUREUS DNA NOSE QL NAA+PROBE: SIGNIFICANT CHANGE UP
SODIUM SERPL-SCNC: 139 MMOL/L — SIGNIFICANT CHANGE UP (ref 135–145)
WBC # BLD: 4.09 K/UL — SIGNIFICANT CHANGE UP (ref 3.8–10.5)
WBC # FLD AUTO: 4.09 K/UL — SIGNIFICANT CHANGE UP (ref 3.8–10.5)

## 2021-07-23 PROCEDURE — 50435 EXCHANGE NEPHROSTOMY CATH: CPT | Mod: RT

## 2021-07-23 RX ORDER — SODIUM CHLORIDE 9 MG/ML
1000 INJECTION, SOLUTION INTRAVENOUS
Refills: 0 | Status: DISCONTINUED | OUTPATIENT
Start: 2021-07-23 | End: 2021-07-27

## 2021-07-23 RX ADMIN — CEFTRIAXONE 100 MILLIGRAM(S): 500 INJECTION, POWDER, FOR SOLUTION INTRAMUSCULAR; INTRAVENOUS at 01:00

## 2021-07-23 RX ADMIN — CEFTRIAXONE 100 MILLIGRAM(S): 500 INJECTION, POWDER, FOR SOLUTION INTRAMUSCULAR; INTRAVENOUS at 23:45

## 2021-07-23 RX ADMIN — Medication 650 MILLIGRAM(S): at 23:46

## 2021-07-23 NOTE — PROGRESS NOTE ADULT - PROBLEM SELECTOR PLAN 10
PCN tube exchange this Friday   Ceftriaxone till 7/29 if can go home on PO ABT, pt will d/c home and f/u with own heme/onc and urology  pending decision for bx PCN tube exchange today  Ceftriaxone till 7/29 if can go home on PO ABT, pt will d/c home and f/u with own heme/onc and urology  pending decision for bx  IR requested to have PET scan result - sent requested to [facility] radiology 7/23

## 2021-07-23 NOTE — PROGRESS NOTE ADULT - PROBLEM SELECTOR PLAN 3
never biopsied in the past per pt  uro recc - at this time, would not recommend bladder biopsy before LND biopsy as tisus dx of new LND focus would change treatment plan.  heme/onc is following never biopsied in the past per pt  uro recc - at this time, would not recommend bladder biopsy before LND biopsy as tissues dx of new LND focus would change treatment plan.  heme/onc is following

## 2021-07-23 NOTE — PROGRESS NOTE ADULT - PROBLEM SELECTOR PLAN 8
on Lovenox - will hold a dose on Friday   Cachexic but gained 20lbs x yrs  - Nutrition consult placed

## 2021-07-23 NOTE — PROGRESS NOTE ADULT - SUBJECTIVE AND OBJECTIVE BOX
NP Note discussed with  Primary Attending    Patient is a 55y old  Female who presents with a chief complaint of LT flank pain (22 Jul 2021 14:24)      INTERVAL HPI/OVERNIGHT EVENTS: no new complaints    MEDICATIONS  (STANDING):  cefTRIAXone   IVPB      cefTRIAXone   IVPB 1000 milliGRAM(s) IV Intermittent every 24 hours  dextrose 5% + sodium chloride 0.9%. 1000 milliLiter(s) (60 mL/Hr) IV Continuous <Continuous>  polyethylene glycol 3350 17 Gram(s) Oral daily  senna 2 Tablet(s) Oral at bedtime    MEDICATIONS  (PRN):  acetaminophen   Tablet .. 650 milliGRAM(s) Oral every 4 hours PRN Mild Pain (1 - 3)  traMADol 50 milliGRAM(s) Oral every 6 hours PRN Moderate Pain (4 - 6)  traMADol 75 milliGRAM(s) Oral every 6 hours PRN Severe Pain (7 - 10)      __________________________________________________  REVIEW OF SYSTEMS:    CONSTITUTIONAL: No fever,   EYES: no acute visual disturbances  NECK: No pain or stiffness  RESPIRATORY: No cough; No shortness of breath  CARDIOVASCULAR: No chest pain, no palpitations  GASTROINTESTINAL: No pain. No nausea or vomiting; No diarrhea   NEUROLOGICAL: No headache or numbness, no tremors  MUSCULOSKELETAL: No joint pain, no muscle pain  GENITOURINARY: no dysuria, no frequency, no hesitancy  PSYCHIATRY: no depression , no anxiety  ALL OTHER  ROS negative        Vital Signs Last 24 Hrs  T(C): 36.1 (23 Jul 2021 05:20), Max: 36.6 (22 Jul 2021 20:47)  T(F): 97 (23 Jul 2021 05:20), Max: 97.9 (22 Jul 2021 20:47)  HR: 69 (23 Jul 2021 05:20) (58 - 69)  BP: 141/84 (23 Jul 2021 05:20) (116/70 - 141/84)  BP(mean): --  RR: 18 (23 Jul 2021 05:20) (16 - 18)  SpO2: 100% (23 Jul 2021 05:20) (98% - 100%)    ________________________________________________  PHYSICAL EXAM:  GENERAL: NAD  HEENT: Normocephalic;  conjunctivae and sclerae clear; moist mucous membranes;   NECK : supple  CHEST/LUNG: Clear to auscultation bilaterally with good air entry   HEART: S1 S2  regular; no murmurs, gallops or rubs  ABDOMEN: Soft, Nontender, Nondistended; Bowel sounds present  EXTREMITIES: no cyanosis; no edema; no calf tenderness  SKIN: warm and dry; no rash  NERVOUS SYSTEM:  Awake and alert; Oriented  to place, person and time ; no new deficits    _________________________________________________  LABS:                        15.0   4.09  )-----------( 225      ( 23 Jul 2021 08:04 )             45.7     07-23    139  |  105  |  34<H>  ----------------------------<  78  4.6   |  29  |  1.20    Ca    10.4      23 Jul 2021 08:04      PT/INR - ( 23 Jul 2021 08:04 )   PT: 12.6 sec;   INR: 1.06 ratio         PTT - ( 23 Jul 2021 08:04 )  PTT:29.0 sec    CAPILLARY BLOOD GLUCOSE      POCT Blood Glucose.: 73 mg/dL (23 Jul 2021 11:25)        RADIOLOGY & ADDITIONAL TESTS:    Imaging Personally Reviewed:  YES/NO    Consultant(s) Notes Reviewed:   YES/ No    Care Discussed with Consultants :     Plan of care was discussed with patient and /or primary care giver; all questions and concerns were addressed and care was aligned with patient's wishes.     NP Note discussed with  Primary Attending    Patient is a 55y old  Female who presents with a chief complaint of LT flank pain (22 Jul 2021 14:24)      INTERVAL HPI/OVERNIGHT EVENTS: no new complaints    MEDICATIONS  (STANDING):  cefTRIAXone   IVPB      cefTRIAXone   IVPB 1000 milliGRAM(s) IV Intermittent every 24 hours  dextrose 5% + sodium chloride 0.9%. 1000 milliLiter(s) (60 mL/Hr) IV Continuous <Continuous>  polyethylene glycol 3350 17 Gram(s) Oral daily  senna 2 Tablet(s) Oral at bedtime    MEDICATIONS  (PRN):  acetaminophen   Tablet .. 650 milliGRAM(s) Oral every 4 hours PRN Mild Pain (1 - 3)  traMADol 50 milliGRAM(s) Oral every 6 hours PRN Moderate Pain (4 - 6)  traMADol 75 milliGRAM(s) Oral every 6 hours PRN Severe Pain (7 - 10)      __________________________________________________  REVIEW OF SYSTEMS:    CONSTITUTIONAL: No fever,   EYES: no acute visual disturbances  NECK: No pain or stiffness  RESPIRATORY: No cough; No shortness of breath  CARDIOVASCULAR: No chest pain, no palpitations  GASTROINTESTINAL: No pain. No nausea or vomiting; No diarrhea   NEUROLOGICAL: No headache or numbness, no tremors  MUSCULOSKELETAL: No joint pain, no muscle pain  GENITOURINARY: no dysuria, no frequency, no hesitancy  PSYCHIATRY: no depression , no anxiety  ALL OTHER  ROS negative        Vital Signs Last 24 Hrs  T(C): 36.1 (23 Jul 2021 05:20), Max: 36.6 (22 Jul 2021 20:47)  T(F): 97 (23 Jul 2021 05:20), Max: 97.9 (22 Jul 2021 20:47)  HR: 69 (23 Jul 2021 05:20) (58 - 69)  BP: 141/84 (23 Jul 2021 05:20) (116/70 - 141/84)  BP(mean): --  RR: 18 (23 Jul 2021 05:20) (16 - 18)  SpO2: 100% (23 Jul 2021 05:20) (98% - 100%)    ________________________________________________  PHYSICAL EXAM:  GENERAL: NAD  HEENT: Normocephalic;  conjunctivae and sclerae clear; moist mucous membranes;   NECK : supple  CHEST/LUNG: Clear to auscultation bilaterally with good air entry   HEART: S1 S2  regular; no murmurs, gallops or rubs  ABDOMEN: Soft, Nontender, Nondistended; Bowel sounds present rt nephrostomy with drain   EXTREMITIES: no cyanosis; no edema; no calf tenderness  SKIN: warm and dry; no rash  NERVOUS SYSTEM:  Awake and alert; Oriented  to place, person and time ; no new deficits    _________________________________________________  LABS:                        15.0   4.09  )-----------( 225      ( 23 Jul 2021 08:04 )             45.7     07-23    139  |  105  |  34<H>  ----------------------------<  78  4.6   |  29  |  1.20    Ca    10.4      23 Jul 2021 08:04      PT/INR - ( 23 Jul 2021 08:04 )   PT: 12.6 sec;   INR: 1.06 ratio         PTT - ( 23 Jul 2021 08:04 )  PTT:29.0 sec    CAPILLARY BLOOD GLUCOSE      POCT Blood Glucose.: 73 mg/dL (23 Jul 2021 11:25)        RADIOLOGY & ADDITIONAL TESTS:  < from: CT Abdomen and Pelvis No Cont (07.19.21 @ 17:18) >  EXAM:  CT ABDOMEN AND PELVIS                            PROCEDURE DATE:  07/19/2021          INTERPRETATION:  CLINICAL INFORMATION: Left flank pain. Antecedent history of bladder cancer    COMPARISON: 8/17/2019    CONTRAST/COMPLICATIONS:  IV Contrast: NONE  Oral Contrast: NONE  Complications: None reported at time of study completion    PROCEDURE:  CT of the Abdomen and Pelvis was performed.  Sagittal and coronal reformats were performed.  Evaluation solid organ parenchyma and urinary tract significantly limited by lack of IV contrast.  FINDINGS:  LOWER CHEST: In situ cardiac pacer. Clear lung bases..    LIVER: Within normal limits.  BILE DUCTS: Normal caliber.  GALLBLADDER: Within normal limits.  SPLEEN: Within normal limits.  PANCREAS: Within normal limits.  ADRENALS: Within normal limits.  KIDNEYS/URETERS: Severe end-stage chronic left hydroureteronephrosis with marked thinning of the cortical mantle, worsened compared to prior. No obstructing calculus. Right nephrostomy catheter in situ. No hydronephrosis or urolithiasis on the right.    BLADDER: Extensive poorly defined lobulated soft tissue in the bladder consistent with the history of bladder neoplasm. Associated hematoma difficult to exclude.  REPRODUCTIVE ORGANS: No gynecologic mass    BOWEL: No bowel obstruction. Appendix no appendicitis  PERITONEUM: No ascites.  VESSELS: Nonaneurysmal.  RETROPERITONEUM/LYMPH NODES: No lymphadenopathy.  ABDOMINAL WALL: Within normal limits.  BONES: No acute or aggressive pathology.    IMPRESSION:  Significantly limited by lack of IV contrast.  Severe end-stage chronic left hydroureteronephrosis without obstructing calculus presumably related to bladder neoplasm.  Poorly defined bladder soft tissue density consistent with afforded history of bladder neoplasm.        --- End of Report ---            GERA PIKE MD; Attending Radiologist  This document has been electronically signed. Jul 19 2021  5:41PM    < end of copied text >  < from: Xray Chest 2 Views PA/Lat (07.19.21 @ 15:45) >    EXAM:  XR CHEST PA LAT 2V                            PROCEDURE DATE:  07/19/2021          INTERPRETATION:  INDICATION: Chest Pain    PRIORS: 4/17/2020.    VIEWS: Frontal and lateral radiographs of the chest performed.    FINDINGS: Heart size appears within normal limits. There is made of an indwelling pacemaker. No hilar or superior mediastinal abnormalities are identified.  Circumscribed nodularity identified overlying the left lower lung field most consistent with a nipple shadow.  There is no evidence for focal infiltrate, lobar consolidation or pulmonary edema. No pleural effusion or pneumothorax is demonstrated. No mediastinal shift is noted. The visualized osseous structures appear unremarkable.    IMPRESSION: No evidence for focal infiltrate or lobar consolidation.    --- End of Report ---            EMA MASTERS MD; Attending Radiologist  This document has been electronically signed. Jul 20 2021  6:26PM    < end of copied text >    Imaging Personally Reviewed:  YES    Consultant(s) Notes Reviewed:   YES    Care Discussed with Consultants : uro/ heme/onc / ID     Plan of care was discussed with patient and /or primary care giver; all questions and concerns were addressed and care was aligned with patient's wishes.

## 2021-07-23 NOTE — PROGRESS NOTE ADULT - SUBJECTIVE AND OBJECTIVE BOX
Patient is seen and examined at the bed side, is afebrile.  She is tolerating Ceftriaxone okay. The IR evaluation appreciated.      \REVIEW OF SYSTEMS: All other review systems are negative      ALLERGIES: No Known Allergies      ICU Vital Signs Last 24 Hrs  T(C): 36.8 (2021 14:58), Max: 37.1 (2021 14:40)  T(F): 98.3 (2021 14:58), Max: 98.8 (2021 14:40)  HR: 64 (2021 14:58) (58 - 69)  BP: 121/72 (2021 14:58) (116/70 - 155/90)  BP(mean): 101 (2021 14:40) (96 - 106)  ABP: --  ABP(mean): --  RR: 16 (2021 14:58) (13 - 19)  SpO2: 100% (2021 14:58) (95% - 100%)      PHYSICAL EXAM:  GENERAL: Not in distress   CHEST/LUNG: Not using accessory muscles   HEART: s1 and s2 present  ABDOMEN:  Nontender and  Nondistended  : Right PCN in placed, draining yellow color urine  EXTREMITIES: No pedal  edema  CNS: Awake and Alert      LABS:                         15.0   4.09  )-----------( 225      ( 2021 08:04 )             45.7                                   14.5   4.51  )-----------( 211      ( 2021 07:11 )             44.4         07-23    139  |  105  |  34<H>  ----------------------------<  78  4.6   |  29  |  1.20    Ca    10.4      2021 08:04      07-21    137  |  102  |  35<H>  ----------------------------<  82  3.9   |  28  |  1.29    Ca    10.1      2021 07:11  Phos  3.3     07-20  Mg     2.3     07-20    TPro  7.8  /  Alb  3.8  /  TBili  0.6  /  DBili  x   /  AST  18  /  ALT  16  /  AlkPhos  93  07-      Urinalysis Basic - ( 2021 16:19 )  Color: Yellow / Appearance: very cloudy / S.015 / pH: x  Gluc: x / Ketone: Negative  / Bili: Negative / Urobili: Negative   Blood: x / Protein: 500 mg/dL / Nitrite: Negative   Leuk Esterase: Small / RBC: 25-50 /HPF / WBC 26-50 /HPF   Sq Epi: x / Non Sq Epi: Few /HPF / Bacteria: Moderate /HPF        MEDICATIONS  (STANDING):    cefTRIAXone   IVPB      cefTRIAXone   IVPB 1000 milliGRAM(s) IV Intermittent every 24 hours  dextrose 5% + sodium chloride 0.9%. 1000 milliLiter(s) (60 mL/Hr) IV Continuous <Continuous>  polyethylene glycol 3350 17 Gram(s) Oral daily  senna 2 Tablet(s) Oral at bedtime        RADIOLOGY & ADDITIONAL TESTS:     CT Abdomen and Pelvis No Cont (21 @ 17:18) Significantly limited by lack of IV contrast.  Severe end-stage chronic left hydroureteronephrosis without obstructing calculus presumably related to bladder neoplasm.  Poorly defined bladder soft tissue density consistent with afforded history of bladder neoplasm.      MICROBIOLOGY DATA:    Culture - Urine (21 @ 22:16)   Specimen Source: Clean Catch Clean Catch (Midstream)   Culture Results: <10,000 CFU/mL Normal Urogenital Vandana     COVID-19 PCR . (21 @ 19:33)   COVID-19 PCR: NotDetec:                Patient is seen and examined at the bed side, is afebrile.  She is s/p change Right PCNT today, and tolerated the procedure well.      \REVIEW OF SYSTEMS: All other review systems are negative      ALLERGIES: No Known Allergies      ICU Vital Signs Last 24 Hrs  T(C): 36.8 (2021 14:58), Max: 37.1 (2021 14:40)  T(F): 98.3 (2021 14:58), Max: 98.8 (2021 14:40)  HR: 64 (2021 14:58) (58 - 69)  BP: 121/72 (2021 14:58) (116/70 - 155/90)  BP(mean): 101 (2021 14:40) (96 - 106)  ABP: --  ABP(mean): --  RR: 16 (2021 14:58) (13 - 19)  SpO2: 100% (2021 14:58) (95% - 100%)      PHYSICAL EXAM:  GENERAL: Not in distress   CHEST/LUNG: Not using accessory muscles   HEART: s1 and s2 present  ABDOMEN:  Nontender and  Nondistended  : Right PCN in placed, draining yellow color urine  EXTREMITIES: No pedal  edema  CNS: Awake and Alert      LABS:                         15.0   4.09  )-----------( 225      ( 2021 08:04 )             45.7                                   14.5   4.51  )-----------( 211      ( 2021 07:11 )             44.4         07-23    139  |  105  |  34<H>  ----------------------------<  78  4.6   |  29  |  1.20    Ca    10.4      2021 08:04      07-21    137  |  102  |  35<H>  ----------------------------<  82  3.9   |  28  |  1.29    Ca    10.1      2021 07:11  Phos  3.3     07-20  Mg     2.3     07-20    TPro  7.8  /  Alb  3.8  /  TBili  0.6  /  DBili  x   /  AST  18  /  ALT  16  /  AlkPhos  93  07-20      Urinalysis Basic - ( 2021 16:19 )  Color: Yellow / Appearance: very cloudy / S.015 / pH: x  Gluc: x / Ketone: Negative  / Bili: Negative / Urobili: Negative   Blood: x / Protein: 500 mg/dL / Nitrite: Negative   Leuk Esterase: Small / RBC: 25-50 /HPF / WBC 26-50 /HPF   Sq Epi: x / Non Sq Epi: Few /HPF / Bacteria: Moderate /HPF        MEDICATIONS  (STANDING):    cefTRIAXone   IVPB      cefTRIAXone   IVPB 1000 milliGRAM(s) IV Intermittent every 24 hours  dextrose 5% + sodium chloride 0.9%. 1000 milliLiter(s) (60 mL/Hr) IV Continuous <Continuous>  polyethylene glycol 3350 17 Gram(s) Oral daily  senna 2 Tablet(s) Oral at bedtime        RADIOLOGY & ADDITIONAL TESTS:     CT Abdomen and Pelvis No Cont (21 @ 17:18) Significantly limited by lack of IV contrast.  Severe end-stage chronic left hydroureteronephrosis without obstructing calculus presumably related to bladder neoplasm.  Poorly defined bladder soft tissue density consistent with afforded history of bladder neoplasm.      MICROBIOLOGY DATA:    Culture - Urine (21 @ 22:16)   Specimen Source: Clean Catch Clean Catch (Midstream)   Culture Results: <10,000 CFU/mL Normal Urogenital Vandana     COVID-19 PCR . (21 @ 19:33)   COVID-19 PCR: NotDetec:

## 2021-07-23 NOTE — PROGRESS NOTE ADULT - ASSESSMENT
Patient is a 55y old  Female with PMHx of hypertension, bladder masses, questionable bladder cancer, and left-sided hydronephrosis presents to the ER for evaluation of left-sided chest pain and left flank pain x2 weeks. Patient states that she has been feeling left-sided aching chest pain wrapping around to her flank and down the left side of her abdomen. Patient reports that she has history of similar symptoms in the past.  Sterling was recently diagnosed with a UTI last month, and states that she was treated for pyelonephritis and completed her full course of antibiotics. ON admission, she has no fever or Leukocytosis but positive urine analysis. The CT abd/pelvis shows Severe end-stage chronic left hydroureteronephrosis without obstructing calculus presumably related to bladder neoplasm. She has started on Ceftriaxone and the ID consult requested to assist with further evaluation and antibiotic management.        #Complicated  UTI  - in the setting of Right PCN  # Chronic left hydroureteronephrosis without obstructing calculus   # Bladder cancer    would recommend:    1. Post-op Labs   2. Continue Ceftriaxone 1 g daily, until 7/29/21  3. Monitor  kidney function   4. Pain management as needed     d/w patient, covering NP, Jin  and Nursing staff    Attending Attestation:    Spent more than 35 minutes on total encounter, more than 50 % of the visit was spent counseling and/or coordinating care by the Attending physician.       Patient is a 55y old  Female with PMHx of hypertension, bladder masses, questionable bladder cancer, and left-sided hydronephrosis presents to the ER for evaluation of left-sided chest pain and left flank pain x2 weeks. Patient states that she has been feeling left-sided aching chest pain wrapping around to her flank and down the left side of her abdomen. Patient reports that she has history of similar symptoms in the past.  Sterling was recently diagnosed with a UTI last month, and states that she was treated for pyelonephritis and completed her full course of antibiotics. ON admission, she has no fever or Leukocytosis but positive urine analysis. The CT abd/pelvis shows Severe end-stage chronic left hydroureteronephrosis without obstructing calculus presumably related to bladder neoplasm. She has started on Ceftriaxone and the ID consult requested to assist with further evaluation and antibiotic management.        #Complicated  UTI  - in the setting of Right PCN  # Chronic left hydroureteronephrosis without obstructing calculus   # Bladder cancer    would recommend:    1. Post-op Labs   2. Continue Ceftriaxone 1 g daily, until 7/29/21  3. Monitor  kidney function   4. Pain management as needed     d/w patient  and Nursing staff    Attending Attestation:    Spent more than 35 minutes on total encounter, more than 50 % of the visit was spent counseling and/or coordinating care by the Attending physician.

## 2021-07-23 NOTE — PROGRESS NOTE ADULT - ASSESSMENT
55 year old female with PMHx of hypertension,  pacemaker for sick sinus syndrome ([place], ) bladder masses, questionable bladder cancer,  left-sided hydronephrosis and Rt percutaneous nephrostomy tube  presents to the ED with complaints of left-sided chest pain and left flank pain x2 weeks. Pt follows with Dr. Trinidad urologist.  Pt initially went to [facility] then f/u with [facility] for chemo tx. Pt got partial chemo tx as her repeat CT showed huge improvement per pt. So, pt asked to have more chemo but [facility] declined further chemo so she started f/u with Dr. Trinidad. Pt asked to have bladder biopsy but Dr. Trinidad suggested to have PET scan first which was done on 7/16 with result of para- aortic lymph nodes( pt can pull up her  EMR chart with PET scan image from her phone)    / Heme/onc /ID and IR consulted   -  while patient was initially thought to have T1 disease in bladder, appearance on recent imaging may suggest T3. However, recently had PET on 7/16/21 demonstrating new FDG-avid left para-aortic LN in RP space, consistent w/ possible met. Urine culture results from  clear catch with no growth. Suggested to have nephrostomy tube exchange and at this time, would not recommend bladder biopsy before LND biopsy as tisus dx of new LND focus would change treatment plan.    IR  - nephrostomy tube change 7/23. Will have a discussion if her LN is feasible for biopsy or Continue ceftriaxone till 7/29 per ID .      Heme/onc - Able to speak to Dr. Trinidad - Pt f/u with [facility] then [facility] [place] got [place] chemo tx.  Pt presented as a T1 but likely understaged as she already had Lt hydro at that time. Molecular studies were not done. Pt had Lt retroperitoneal nodul biopsy in 10/2020 which was negative for malignancy. She was tx with neoadjuvant chemo x gem/cis for 2 cycles then d/fernie as she developed pyelo.Pt will f/u with medical oncology/ [facility] upon discharge.     ID - ceftriaxone till 7/29    55 year old female with PMHx of hypertension,  pacemaker for sick sinus syndrome ([employer] [place], ) bladder masses, questionable bladder cancer,  left-sided hydronephrosis and Rt percutaneous nephrostomy tube  presents to the ED with complaints of left-sided chest pain and left flank pain x2 weeks. Pt follows with Dr. Trinidad urologist.  Pt initially went to [facility] then f/u with [facility] for chemo tx. Pt got partial chemo tx as her repeat CT showed huge improvement per pt. So, pt asked to have more chemo but [facility] declined further chemo so she started f/u with Dr. Trinidad. Pt asked to have bladder biopsy but Dr. Trinidad suggested to have PET scan first which was done on 7/16 with result of para- aortic lymph nodes( pt can pull up her  EMR chart with PET scan image from her phone)    / Heme/onc /ID and IR consulted   -  while patient was initially thought to have T1 disease in bladder, appearance on recent imaging may suggest T3. However, recently had PET on 7/16/21 demonstrating new FDG-avid left para-aortic LN in RP space, consistent w/ possible met. Urine culture results from  clear catch with no growth. Suggested to have nephrostomy tube exchange and at this time, would not recommend bladder biopsy before LND biopsy as tisus dx of new LND focus would change treatment plan.    IR  - nephrostomy tube change 7/23. Will have a discussion if her LN is feasible for biopsy or Continue ceftriaxone till 7/29 per ID .      Heme/onc - Able to speak to Dr. Trinidad - Pt f/u with [facility] then [facility] where she got first chemo tx.  Pt presented as a T1 but likely understaged as she already had Lt hydro at that time. Molecular studies were not done. Pt had Lt retroperitoneal nodul biopsy in 10/2020 which was negative for malignancy. She was tx with neoadjuvant chemo x gem/cis for 2 cycles then d/fernie as she developed pyelo.Pt will f/u with medical oncology/ [facility] upon discharge.     ID - ceftriaxone till 7/29     send medical record release form to [facility] at [phone] / fax [phone] to get PET scan result. IR wants to review PET scan first to make decision if LN bx is feasible or not   Nephrostomy tube exchange today with IR

## 2021-07-23 NOTE — PROGRESS NOTE ADULT - ASSESSMENT
_________________________________________________________________________________________  ========>>  M E D I C A L   A T T E N D I N G    F O L L O W  U P  N O T E  <<=========  -----------------------------------------------------------------------------------------------------    - Patient seen and examined by me earlier today.    - In summary,  JEF HOUSE is a 55y year old woman admitted with Lft flank pain   - Patient today overall doing ok, comfortable, NPO for procedure..     ==================>> REVIEW OF SYSTEM <<=================    GEN: no fever, no chills, pain in left  flank on and off  > improved   RESP: no SOB, no cough, no sputum  CVS: no chest pain, no palpitations, no edema  GI: no abdominal pain, no nausea  : no dysuria, no frequency, no hematuria reported    Neuro: no headache, no dizziness  Derm : no itching, no rash    ==================>> PHYSICAL EXAM <<=================    GEN: A&O X 3 , NAD , comfortable, pleasant, calm , cachectic , alert and interactive, a bit anxious    HEENT: NCAT, PERRL, MMM, hearing intact  Neck: supple , no JVD appreciated  CVS: S1S2 , regular , No M/R/G appreciated  PULM: CTA B/L,  no W/R/R appreciated  ABD.: soft. non tender, non distended  Extrem: intact pulses , no edema     nephrostomy to bag with yellow urine   PSYCH : normal mood,  not anxious                             ( Note written / Date of service 07-23-21 )    ==================>> MEDICATIONS <<====================    cefTRIAXone   IVPB      cefTRIAXone   IVPB 1000 milliGRAM(s) IV Intermittent every 24 hours  dextrose 5% + sodium chloride 0.9%. 1000 milliLiter(s) IV Continuous <Continuous>  polyethylene glycol 3350 17 Gram(s) Oral daily  senna 2 Tablet(s) Oral at bedtime    MEDICATIONS  (PRN):  acetaminophen   Tablet .. 650 milliGRAM(s) Oral every 4 hours PRN Mild Pain (1 - 3)  traMADol 50 milliGRAM(s) Oral every 6 hours PRN Moderate Pain (4 - 6)  traMADol 75 milliGRAM(s) Oral every 6 hours PRN Severe Pain (7 - 10)    ___________  Active diet:  Diet, Regular:   DASH/TLC Sodium & Cholesterol Restricted  ___________________    ==================>> VITAL SIGNS <<==================    Vital Signs Last 24 HrsT(C): 36.8 (07-23-21 @ 14:58)  T(F): 98.3 (07-23-21 @ 14:58), Max: 98.8 (07-23-21 @ 14:40)  HR: 64 (07-23-21 @ 14:58) (58 - 69)  BP: 121/72 (07-23-21 @ 14:58)  RR: 16 (07-23-21 @ 14:58) (13 - 19)  SpO2: 100% (07-23-21 @ 14:58) (95% - 100%)      POCT Blood Glucose.: 73 mg/dL (23 Jul 2021 11:25)     ==================>> LAB AND IMAGING <<==================                        15.0   4.09  )-----------( 225      ( 23 Jul 2021 08:04 )             45.7        07-23    139  |  105  |  34<H>  ----------------------------<  78  4.6   |  29  |  1.20    Ca    10.4      23 Jul 2021 08:04      WBC count:   4.09 <<== ,  4.51 <<== ,  4.52 <<== ,  5.23 <<==   Hemoglobin:   15.0 <<==,  14.5 <<==,  13.6 <<==,  13.4 <<==  platelets:  225 <==, 211 <==, 214 <==, 207 <==    Creatinine:  1.20  <<==, 1.29  <<==, 1.10  <<==, 1.17  <<==  Sodium:   139  <==, 137  <==, 139  <==, 137  <==       AST:          18 <== , 25 <==      ALT:        16  <== , 17  <==      AP:        93  <=, 91  <=     Bili:        0.6  <=, 0.4  <=    _______________________  C U L T U R E S :    Culture - Urine (collected 19 Jul 2021 22:16)  Source: Clean Catch Clean Catch (Midstream)  Final Report (20 Jul 2021 20:27):    <10,000 CFU/mL Normal Urogenital Vandana    COVID-19 PCR: NotDetec (07-22-21 @ 12:25)  COVID-19 PCR: NotDetec (07-19-21 @ 19:33)    ___________________________________________________________________________________  ===============>>  A S S E S S M E N T   A N D   P L A N <<===============  ------------------------------------------------------------------------------------------    · Assessment	  55 year old female with PMHx of hypertension,  pacemaker for sick sinus syndrome ([place], ) bladder masses, questionable bladder cancer,  left-sided hydronephrosis and Rt PCN  presents to the ED with complaints of left-sided chest pain and left flank pain x2 weeks. Pt follows with Dr. Trinidad. Per  while patient was initially thought to have T1 disease in bladder, appearance on recent imaging may suggest T3. However, recently had PET on 7/16/21 demonstrating new FDG-avid left para-aortic LN in RP space, consistent w/ possible met    Problem/Plan - 1:  ·  Problem: complex UTI and Pyelonephritis.    - Patient presented with left flank pain. ( though left flank pain likely due to cysts and not pyelo)   - follow cultures from urine and nephrostomy  - Appreciate Urology and IR follow up   - Appreciate ID follow up  - for tube check / change today by IR with anesthesia     Problem/Plan - 2:  ·  Problem: Nephrostomy status.  Plan: - Patient has past history of bladder mass.   - CT abdomen showing Severe end-stage chronic left hydroureteronephrosis without obstructing calculus presumably related to bladder neoplasm. Poorly defined bladder soft tissue density consistent with afforded history of bladder neoplasm.  - Appreciate Urology recs - Continue w/ Rt PCN. >> shayne for IR for tube change / exchange Friday with anesthesia ( per pt preference)        left kidney no plans for any intervention as no expectation for recovery as noted and discussed   -  and other providers have discussed with pt's Uro: Dr. Trinidad >> trying to get report of PET scan for possible Bx of RP lymph node >> plan for IR to review PET and plan for possible LN Bx     Problem/Plan - 3:  ·  Problem: Chest pain.  Plan: - Patient c/o left chest pain with left flank pain   - EKG showing sinus bradycardia  - Dr Rosario consulted- pt had a recent echo and stress test in Dr Rosario office was negative  - less likely cardiac as above     D-Dimer Assay, Quantitative: 170 ng/mL DDU (07.20.21 @ 08:18)  - no further cardiac work up needed.     Problem/Plan - 4:  ·  Problem: Bladder mass.    as above  - A oncology group on board   awaiting PET..     Problem/Plan - 5:  ·  Problem: HTN   - Patient has past  hsitory of HTN controlled on diet.    Problem/Plan - 6:  ·  Problem: Asthma.    - Patient has past history of asthma not on any meds and well controlled     GI/ DVT prophylaxis   --------------------------------------------  Case discussed with pt, NP..   Education given on findings and plan of care  ___________________________  H. NIEVES Ochoa.  Pager: [phone]

## 2021-07-23 NOTE — PROGRESS NOTE ADULT - PROBLEM SELECTOR PLAN 2
Patient has past history of bladder mass  see hospital course  CT abdomen showing Severe end-stage chronic left hydroureteronephrosis without obstructing calculus presumably related to bladder neoplasm. Poorly defined bladder soft tissue density consistent with afforded history of bladder neoplasm  Appreciate Urology recs -c/w Rt PCN. Pt reports right NT drains poorly, flushed it couple of times overnight.   No growth urine culture   IR nephrostomy exchange 7/23 with anesthesia   MNNPO tonight , labs in AM, hold lovenox in AM ,  Negative COVID today Patient has past history of bladder mass  see hospital course  CT abdomen showing Severe end-stage chronic left hydroureteronephrosis without obstructing calculus presumably related to bladder neoplasm. Poorly defined bladder soft tissue density consistent with afforded history of bladder neoplasm  Appreciate Urology recs -c/w Rt PCN. Pt reports right NT drains poorly, flushed it couple of times overnight.   No growth urine culture   IR nephrostomy exchange 7/23 with anesthesia   MNNPO tonight , labs in AM, hold lovenox in AM ,  Negative COVID

## 2021-07-23 NOTE — PROGRESS NOTE ADULT - PROBLEM SELECTOR PLAN 4
per her out pt PET scan from 7/16   para aortic lymph nodes  with  hx of bladder mass  - urology/ hemeonc recommends LN biopsy if site is feasible for IR per her out pt PET scan from 7/16   para aortic lymph nodes  with  hx of bladder mass  - urology/ hemeonc recommends LN biopsy if site is feasible for IR  Medical record release form sent to [facility] [address], [phone] /Fax [phone] per her out pt PET scan from 7/16   para aortic lymph nodes  with  hx of bladder mass  - urology/ hemeonc recommends LN biopsy if site is feasible for IR  Medical record release form sent to [facility] [address], [phone] /Fax [phone]  pt also has CD at home, will try to have a person to bring the CD to the hospital - when CD is available please call IR to review it

## 2021-07-23 NOTE — PROGRESS NOTE ADULT - SUBJECTIVE AND OBJECTIVE BOX
DAILY PROGRESS NOTE:         24 hr events:  morroeon  plan for IR PCN exchange     Objective:    Vital Signs Last 24 Hrs  T(C): 36.1 (23 Jul 2021 05:20), Max: 37 (22 Jul 2021 11:23)  T(F): 97 (23 Jul 2021 05:20), Max: 98.6 (22 Jul 2021 11:23)  HR: 69 (23 Jul 2021 05:20) (58 - 79)  BP: 141/84 (23 Jul 2021 05:20) (116/70 - 141/84)  BP(mean): --  RR: 18 (23 Jul 2021 05:20) (14 - 18)  SpO2: 100% (23 Jul 2021 05:20) (98% - 100%)    I&O's Detail    22 Jul 2021 07:01  -  23 Jul 2021 07:00  --------------------------------------------------------  IN:  Total IN: 0 mL    OUT:    Nephrostomy Tube (mL): 250 mL    Voided (mL): 300 mL  Total OUT: 550 mL    Total NET: -550 mL          Physical Exam:    General: NAD, thin   Ext: ROMIx4, motor strength intact x 4  : rt PCN in place  Psych: AOx3  Neuro: No focal deficits       Laboratory Results:                          15.0   4.09  )-----------( 225      ( 23 Jul 2021 08:04 )             45.7     07-23    139  |  105  |  34<H>  ----------------------------<  78  4.6   |  29  |  1.20    Ca    10.4      23 Jul 2021 08:04      PT/INR - ( 23 Jul 2021 08:04 )   PT: 12.6 sec;   INR: 1.06 ratio         PTT - ( 23 Jul 2021 08:04 )  PTT:29.0 sec

## 2021-07-23 NOTE — PROGRESS NOTE ADULT - PROBLEM SELECTOR PLAN 9
AO x 3  full code  pt makes decisions herself

## 2021-07-23 NOTE — PROGRESS NOTE ADULT - PROBLEM SELECTOR PLAN 5
Patient c/o left chest pain with left flank pain   EKG showing sinus bradycardia, Lipid panel -chol 243,  (elevated)-->may consider starting Statins  Appreciate Dr Marlene lopez - pt had a recent Echo and Stress test in the office, was negative  Trop #1- neg, trop 2 -neg and D Dimer- 170 wnl  no further cardiac work up needed Patient c/o left chest pain with left flank pain   resolved   EKG showing sinus bradycardia, Lipid panel -chol 243,  (elevated)-->may consider starting Statins  Appreciate Dr Marlene lopez - pt had a recent Echo and Stress test in the office, was negative  Trop #1- neg, trop 2 -neg and D Dimer- 170 wnl  no further cardiac work up needed

## 2021-07-23 NOTE — PROGRESS NOTE ADULT - ASSESSMENT
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 These symptoms are chronic per patient. Seen to have gross left HUN w/ marked cortical thinning w/ rt PCN in place with no hydro. Patient is nonseptic and UA not suggestive of definitive UTI. Cr at patient baseline.     --IR right PCN exchange today   --Discussed case w/ Dr Trinidad (outpatient urologist). Recommendations as bellow:   -- Given findings on PET, recommends IR biopsy of left para-aortic node as seen on scan. C/s IR for evaluation for rosa biopsy   --IR aware. Pending biopsy plan. At this time, would not recommend bladder biopsy before LND biopsy as tissue dx of new LND focus would change treatment plan. Planning for LND biopsy at this time

## 2021-07-23 NOTE — PROGRESS NOTE ADULT - PROBLEM SELECTOR PLAN 1
Patient presented with left flank pain. Patient afebrile, no WBCs  CT abdomen findings reviewed showing Severe end-stage chronic left hydroureteronephrosis without obstructing calculus presumably related to bladder neoplasm. Poorly defined bladder soft tissue density consistent with afforded history of bladder neoplasm  UA weakly positive, urine Cx  with no growth   Appreciate Urology recs- As Cr is stable and pt does not appear to have an obstructive pyelonephritis, no acute intervention is needed  Appreciate ID Dr Rodriguez recs- changed Rocephin to Cefepime 1g q8  then d/fernie as pt could not tolerate it .  ceftriaxone till 7/29 per ID Dr. Rodriguez

## 2021-07-24 LAB
ANION GAP SERPL CALC-SCNC: 6 MMOL/L — SIGNIFICANT CHANGE UP (ref 5–17)
BUN SERPL-MCNC: 34 MG/DL — HIGH (ref 7–18)
CALCIUM SERPL-MCNC: 9.8 MG/DL — SIGNIFICANT CHANGE UP (ref 8.4–10.5)
CHLORIDE SERPL-SCNC: 106 MMOL/L — SIGNIFICANT CHANGE UP (ref 96–108)
CO2 SERPL-SCNC: 27 MMOL/L — SIGNIFICANT CHANGE UP (ref 22–31)
CREAT SERPL-MCNC: 0.98 MG/DL — SIGNIFICANT CHANGE UP (ref 0.5–1.3)
GLUCOSE SERPL-MCNC: 84 MG/DL — SIGNIFICANT CHANGE UP (ref 70–99)
HCT VFR BLD CALC: 42.1 % — SIGNIFICANT CHANGE UP (ref 34.5–45)
HGB BLD-MCNC: 13.9 G/DL — SIGNIFICANT CHANGE UP (ref 11.5–15.5)
MCHC RBC-ENTMCNC: 30.4 PG — SIGNIFICANT CHANGE UP (ref 27–34)
MCHC RBC-ENTMCNC: 33 GM/DL — SIGNIFICANT CHANGE UP (ref 32–36)
MCV RBC AUTO: 92.1 FL — SIGNIFICANT CHANGE UP (ref 80–100)
NRBC # BLD: 0 /100 WBCS — SIGNIFICANT CHANGE UP (ref 0–0)
PLATELET # BLD AUTO: 192 K/UL — SIGNIFICANT CHANGE UP (ref 150–400)
POTASSIUM SERPL-MCNC: 4.2 MMOL/L — SIGNIFICANT CHANGE UP (ref 3.5–5.3)
POTASSIUM SERPL-SCNC: 4.2 MMOL/L — SIGNIFICANT CHANGE UP (ref 3.5–5.3)
RBC # BLD: 4.57 M/UL — SIGNIFICANT CHANGE UP (ref 3.8–5.2)
RBC # FLD: 13.1 % — SIGNIFICANT CHANGE UP (ref 10.3–14.5)
SODIUM SERPL-SCNC: 139 MMOL/L — SIGNIFICANT CHANGE UP (ref 135–145)
WBC # BLD: 5.81 K/UL — SIGNIFICANT CHANGE UP (ref 3.8–10.5)
WBC # FLD AUTO: 5.81 K/UL — SIGNIFICANT CHANGE UP (ref 3.8–10.5)

## 2021-07-24 RX ADMIN — Medication 650 MILLIGRAM(S): at 00:35

## 2021-07-24 RX ADMIN — Medication 650 MILLIGRAM(S): at 13:10

## 2021-07-24 RX ADMIN — POLYETHYLENE GLYCOL 3350 17 GRAM(S): 17 POWDER, FOR SOLUTION ORAL at 12:41

## 2021-07-24 RX ADMIN — Medication 650 MILLIGRAM(S): at 12:41

## 2021-07-24 RX ADMIN — CEFTRIAXONE 100 MILLIGRAM(S): 500 INJECTION, POWDER, FOR SOLUTION INTRAMUSCULAR; INTRAVENOUS at 22:20

## 2021-07-24 NOTE — PROGRESS NOTE ADULT - ASSESSMENT
Patient is a 55y old  Female with PMHx of hypertension, bladder masses, questionable bladder cancer, and left-sided hydronephrosis presents to the ER for evaluation of left-sided chest pain and left flank pain x2 weeks. Patient states that she has been feeling left-sided aching chest pain wrapping around to her flank and down the left side of her abdomen. Patient reports that she has history of similar symptoms in the past.  Sterling was recently diagnosed with a UTI last month, and states that she was treated for pyelonephritis and completed her full course of antibiotics. ON admission, she has no fever or Leukocytosis but positive urine analysis. The CT abd/pelvis shows Severe end-stage chronic left hydroureteronephrosis without obstructing calculus presumably related to bladder neoplasm. She has started on Ceftriaxone and the ID consult requested to assist with further evaluation and antibiotic management.        #Complicated  UTI  - in the setting of Right PCN  # Chronic left hydroureteronephrosis without obstructing calculus   # Bladder cancer    would recommend:    1. Post-op Labs   2. Continue Ceftriaxone 1 g daily, until 7/29/21  3. Monitor  kidney function   4. Pain management as needed     d/w patient  and Nursing staff    Attending Attestation:    Spent more than 35 minutes on total encounter, more than 50 % of the visit was spent counseling and/or coordinating care by the Attending physician. Patient is a 55y old  Female with PMHx of hypertension, bladder masses, questionable bladder cancer, and left-sided hydronephrosis presents to the ER for evaluation of left-sided chest pain and left flank pain x2 weeks. Patient states that she has been feeling left-sided aching chest pain wrapping around to her flank and down the left side of her abdomen. Patient reports that she has history of similar symptoms in the past.  Sterling was recently diagnosed with a UTI last month, and states that she was treated for pyelonephritis and completed her full course of antibiotics. ON admission, she has no fever or Leukocytosis but positive urine analysis. The CT abd/pelvis shows Severe end-stage chronic left hydroureteronephrosis without obstructing calculus presumably related to bladder neoplasm. She has started on Ceftriaxone and the ID consult requested to assist with further evaluation and antibiotic management.        #Complicated  UTI  - in the setting of Right PCN- s/p change Right PCNT 7/23/21  # Chronic left hydroureteronephrosis without obstructing calculus   # Bladder cancer    would recommend:    1. Pain management as needed  2. Continue Ceftriaxone 1 g daily, until 7/29/21  3. Monitor  kidney function   4. Hem/.Onc follow up    d/w patient  and Nursing staff    Attending Attestation:    Spent more than 35 minutes on total encounter, more than 50 % of the visit was spent counseling and/or coordinating care by the Attending physician.

## 2021-07-24 NOTE — PROGRESS NOTE ADULT - SUBJECTIVE AND OBJECTIVE BOX
CARDIOLOGY     PROGRESS  NOTE   ________________________________________________    CHIEF COMPLAINT:Patient is a 55y old  Female who presents with a chief complaint of Lt flank pain and chest pain (23 Jul 2021 12:50)  doing better.  	  REVIEW OF SYSTEMS:  CONSTITUTIONAL: No fever, weight loss, or fatigue  EYES: No eye pain, visual disturbances, or discharge  ENT:  No difficulty hearing, tinnitus, vertigo; No sinus or throat pain  NECK: No pain or stiffness  RESPIRATORY: No cough, wheezing, chills or hemoptysis; No Shortness of Breath  CARDIOVASCULAR: No chest pain, palpitations, passing out, dizziness, or leg swelling  GASTROINTESTINAL: No abdominal or epigastric pain. No nausea, vomiting, or hematemesis; No diarrhea or constipation. No melena or hematochezia.  GENITOURINARY: No dysuria, frequency, hematuria, or incontinence  NEUROLOGICAL: No headaches, memory loss, loss of strength, numbness, or tremors  SKIN: No itching, burning, rashes, or lesions   LYMPH Nodes: No enlarged glands  ENDOCRINE: No heat or cold intolerance; No hair loss  MUSCULOSKELETAL: No joint pain or swelling; No muscle, back, or extremity pain  PSYCHIATRIC: No depression, anxiety, mood swings, or difficulty sleeping  HEME/LYMPH: No easy bruising, or bleeding gums  ALLERGY AND IMMUNOLOGIC: No hives or eczema	    [ ] All others negative	  [ ] Unable to obtain    PHYSICAL EXAM:  T(C): 36.3 (07-24-21 @ 05:15), Max: 37.1 (07-23-21 @ 14:40)  HR: 56 (07-24-21 @ 05:15) (56 - 67)  BP: 111/71 (07-24-21 @ 05:15) (111/71 - 155/90)  RR: 16 (07-24-21 @ 05:15) (13 - 19)  SpO2: 100% (07-24-21 @ 05:15) (95% - 100%)  Wt(kg): --  I&O's Summary    23 Jul 2021 07:01  -  24 Jul 2021 07:00  --------------------------------------------------------  IN: 0 mL / OUT: 975 mL / NET: -975 mL        Appearance: Normal	  HEENT:   Normal oral mucosa, PERRL, EOMI	  Lymphatic: No lymphadenopathy  Cardiovascular: Normal S1 S2, No JVD, + murmurs, No edema  Respiratory: Lungs clear to auscultation	  Psychiatry: A & O x 3, Mood & affect appropriate  Gastrointestinal:  Soft, Non-tender, + BS	  Skin: No rashes, No ecchymoses, No cyanosis	  Neurologic: Non-focal  Extremities: Normal range of motion, No clubbing, cyanosis or edema  Vascular: Peripheral pulses palpable 2+ bilaterally    MEDICATIONS  (STANDING):  cefTRIAXone   IVPB      cefTRIAXone   IVPB 1000 milliGRAM(s) IV Intermittent every 24 hours  dextrose 5% + sodium chloride 0.9%. 1000 milliLiter(s) (60 mL/Hr) IV Continuous <Continuous>  polyethylene glycol 3350 17 Gram(s) Oral daily  senna 2 Tablet(s) Oral at bedtime      TELEMETRY: 	    ECG:  	  RADIOLOGY:  OTHER: 	  	  LABS:	 	    CARDIAC MARKERS:                                13.9   5.81  )-----------( 192      ( 24 Jul 2021 06:45 )             42.1     07-24    139  |  106  |  34<H>  ----------------------------<  84  4.2   |  27  |  0.98    Ca    9.8      24 Jul 2021 06:45      proBNP:   Lipid Profile: Cholesterol 243  LDL --  HDL 74  TG 71    HgA1c:   TSH: Thyroid Stimulating Hormone, Serum: 0.65 uU/mL (07-20 @ 08:18)    PT/INR - ( 23 Jul 2021 08:04 )   PT: 12.6 sec;   INR: 1.06 ratio         PTT - ( 23 Jul 2021 08:04 )  PTT:29.0 sec  Culture - Urine (07.19.21 @ 22:16)    Specimen Source: Clean Catch Clean Catch (Midstream)    Culture Results:   <10,000 CFU/mL Normal Urogenital Vandana    --IR right PCN exchange today   --Discussed case w/ Dr Trinidad (outpatient urologist). Recommendations as bellow:   -- Given findings on PET, recommends IR biopsy of left para-aortic node as seen on scan. C/s IR for evaluation for rosa biopsy   --IR aware. Pending biopsy plan. At this time, would not recommend bladder biopsy before LND biopsy as tissue dx of new LND focus would change treatment plan. Planning for LND biopsy at this time     Assessment and plan  ---------------------------  55 year old female with PMHx of hypertension, bladder masses, questionable bladder cancer, and left-sided hydronephrosis presents to the ED with complaints of left-sided chest pain and left flank pain x2 weeks. Patient states that she has been feeling left-sided aching chest pain wrapping around to her flank and down the left side of her abdomen. Patient reports that she has history of similar symptoms in the past. Patient additionally endorses some hematuria. Patient states that she was recently diagnosed with a UTI last month, and states that she was treated for pyelonephritis and completed her full course of antibiotics. Patient otherwise denies any fever, chills, shortness of breath, cough, hemoptysis, and all other acute complaints  pt is a 54 yo well known to me with hx of bradycardia/ sss, s/p ppm now  with chest pain.  check cardiac enzymes  tsh  check d dimer r/o PE  pt had a recent echo and stress test in my office was negative, will review  office records checked normal echo and stress test  continue abx as per ID  ,fu cultures  awaiting  LN  biopsy  abx

## 2021-07-24 NOTE — PROGRESS NOTE ADULT - ASSESSMENT
:covering for dr cannon  Pt is a 55 year old female with PMHx of hypertension,  pacemaker for sick sinus syndrome ([employer] [facility], ) bladder masses, questionable bladder cancer,  left-sided hydronephrosis and Rt PCN  presents to the ED with complaints of left-sided chest pain and left flank pain x2 weeks. Patient states that she has been feeling left-sided aching chest pain wrapping around to her flank and down the left side of her abdomen.   Patient reports that she has history of similar symptoms in the past.  Patient states that she was recently diagnosed with a UTI last month, and states that she was treated for pyelonephritis and completed her full course of antibiotics. She also endorses that her PCN tube isnot draining very well but it drains better when flushing it.    seen and examinedd on bed  c/o of lt back  pain mild  as per her tylenol does well  has this pt from a while  vs stable afebrile physical done ok lungs clear abd soft bs nml, non tender  no renal angle tender  no spinal tenderness  uti on rocephine   ca bladder possible  card for pacemaker

## 2021-07-24 NOTE — PROGRESS NOTE ADULT - SUBJECTIVE AND OBJECTIVE BOX
Patient is seen and examined at the bed side, is afebrile.  She is s/p change Right PCNT today, and tolerated the procedure well.      \REVIEW OF SYSTEMS: All other review systems are negative      ALLERGIES: No Known Allergies      Vital Signs Last 24 Hrs  T(C): 36.5 (2021 12:18), Max: 36.5 (2021 20:33)  T(F): 97.7 (2021 12:18), Max: 97.7 (2021 20:33)  HR: 70 (2021 12:18) (56 - 70)  BP: 121/72 (2021 12:18) (111/71 - 124/76)  BP(mean): --  RR: 17 (2021 12:18) (14 - 17)  SpO2: 100% (2021 12:18) (99% - 100%)      PHYSICAL EXAM:  GENERAL: Not in distress   CHEST/LUNG: Not using accessory muscles   HEART: s1 and s2 present  ABDOMEN:  Nontender and  Nondistended  : Right PCN in placed, draining yellow color urine  EXTREMITIES: No pedal  edema  CNS: Awake and Alert      LABS:                         13.9   5.81  )-----------( 192      ( 2021 06:45 )             42.1                           15.0   4.09  )-----------( 225      ( 2021 08:04 )             45.7       07-24    139  |  106  |  34<H>  ----------------------------<  84  4.2   |  27  |  0.98    Ca    9.8      2021 06:45        07-21    137  |  102  |  35<H>  ----------------------------<  82  3.9   |  28  |  1.29    Ca    10.1      2021 07:11  Phos  3.3     07-20  Mg     2.3     07-20    TPro  7.8  /  Alb  3.8  /  TBili  0.6  /  DBili  x   /  AST  18  /  ALT  16  /  AlkPhos  93  07-20      Urinalysis Basic - ( 2021 16:19 )  Color: Yellow / Appearance: very cloudy / S.015 / pH: x  Gluc: x / Ketone: Negative  / Bili: Negative / Urobili: Negative   Blood: x / Protein: 500 mg/dL / Nitrite: Negative   Leuk Esterase: Small / RBC: 25-50 /HPF / WBC 26-50 /HPF   Sq Epi: x / Non Sq Epi: Few /HPF / Bacteria: Moderate /HPF        MEDICATIONS  (STANDING):    cefTRIAXone   IVPB      cefTRIAXone   IVPB 1000 milliGRAM(s) IV Intermittent every 24 hours  dextrose 5% + sodium chloride 0.9%. 1000 milliLiter(s) (60 mL/Hr) IV Continuous <Continuous>  polyethylene glycol 3350 17 Gram(s) Oral daily  senna 2 Tablet(s) Oral at bedtime        RADIOLOGY & ADDITIONAL TESTS:     CT Abdomen and Pelvis No Cont (21 @ 17:18) Significantly limited by lack of IV contrast.  Severe end-stage chronic left hydroureteronephrosis without obstructing calculus presumably related to bladder neoplasm.  Poorly defined bladder soft tissue density consistent with afforded history of bladder neoplasm.      MICROBIOLOGY DATA:    Culture - Urine (21 @ 22:16)   Specimen Source: Clean Catch Clean Catch (Midstream)   Culture Results: <10,000 CFU/mL Normal Urogenital Vandana     COVID-19 PCR . (21 @ 19:33)   COVID-19 PCR: NotDetec:            Patient is seen and examined at the bed side, is afebrile.  She is c/p Left sided pressure.      \REVIEW OF SYSTEMS: All other review systems are negative      ALLERGIES: No Known Allergies      Vital Signs Last 24 Hrs  T(C): 36.5 (2021 12:18), Max: 36.5 (2021 20:33)  T(F): 97.7 (2021 12:18), Max: 97.7 (2021 20:33)  HR: 70 (2021 12:18) (56 - 70)  BP: 121/72 (2021 12:18) (111/71 - 124/76)  BP(mean): --  RR: 17 (2021 12:18) (14 - 17)  SpO2: 100% (2021 12:18) (99% - 100%)      PHYSICAL EXAM:  GENERAL: Not in distress   CHEST/LUNG: Not using accessory muscles   HEART: s1 and s2 present  ABDOMEN:  Nontender and  Nondistended  : Right PCN in placed, draining yellow color urine  EXTREMITIES: No pedal  edema  CNS: Awake and Alert      LABS:                         13.9   5.81  )-----------( 192      ( 2021 06:45 )             42.1                           15.0   4.09  )-----------( 225      ( 2021 08:04 )             45.7       07-24    139  |  106  |  34<H>  ----------------------------<  84  4.2   |  27  |  0.98    Ca    9.8      2021 06:45        07-21    137  |  102  |  35<H>  ----------------------------<  82  3.9   |  28  |  1.29    Ca    10.1      2021 07:11  Phos  3.3     07-20  Mg     2.3     07-20    TPro  7.8  /  Alb  3.8  /  TBili  0.6  /  DBili  x   /  AST  18  /  ALT  16  /  AlkPhos  93  07-20      Urinalysis Basic - ( 2021 16:19 )  Color: Yellow / Appearance: very cloudy / S.015 / pH: x  Gluc: x / Ketone: Negative  / Bili: Negative / Urobili: Negative   Blood: x / Protein: 500 mg/dL / Nitrite: Negative   Leuk Esterase: Small / RBC: 25-50 /HPF / WBC 26-50 /HPF   Sq Epi: x / Non Sq Epi: Few /HPF / Bacteria: Moderate /HPF        MEDICATIONS  (STANDING):    cefTRIAXone   IVPB      cefTRIAXone   IVPB 1000 milliGRAM(s) IV Intermittent every 24 hours  dextrose 5% + sodium chloride 0.9%. 1000 milliLiter(s) (60 mL/Hr) IV Continuous <Continuous>  polyethylene glycol 3350 17 Gram(s) Oral daily  senna 2 Tablet(s) Oral at bedtime        RADIOLOGY & ADDITIONAL TESTS:     CT Abdomen and Pelvis No Cont (21 @ 17:18) Significantly limited by lack of IV contrast.  Severe end-stage chronic left hydroureteronephrosis without obstructing calculus presumably related to bladder neoplasm.  Poorly defined bladder soft tissue density consistent with afforded history of bladder neoplasm.      MICROBIOLOGY DATA:    Culture - Urine (21 @ 22:16)   Specimen Source: Clean Catch Clean Catch (Midstream)   Culture Results: <10,000 CFU/mL Normal Urogenital Vandana     COVID-19 PCR . (21 @ 19:33)   COVID-19 PCR: NotDetec:

## 2021-07-24 NOTE — PROGRESS NOTE ADULT - SUBJECTIVE AND OBJECTIVE BOX
HPI:  Pt is a 55 year old female with PMHx of hypertension,  pacemaker for sick sinus syndrome ([facility], ) bladder masses, questionable bladder cancer,  left-sided hydronephrosis and Rt PCN  presents to the ED with complaints of left-sided chest pain and left flank pain x2 weeks. Patient states that she has been feeling left-sided aching chest pain wrapping around to her flank and down the left side of her abdomen.   Patient reports that she has history of similar symptoms in the past.  Patient states that she was recently diagnosed with a UTI last month, and states that she was treated for pyelonephritis and completed her full course of antibiotics. She also endorses that her PCN tube isnot draining very well but it drains better when flushing it.    Patient otherwise denies any fever, chills, shortness of breath, cough, hemoptysis, and all other acute complaints.    (19 Jul 2021 22:02)      Patient is a 55y old  Female who presents with a chief complaint of Lt flank pain and chest pain (23 Jul 2021 12:50)      INTERVAL HPI/OVERNIGHT EVENTS:  T(C): 36.5 (07-24-21 @ 12:18), Max: 36.5 (07-23-21 @ 20:33)  HR: 70 (07-24-21 @ 12:18) (56 - 70)  BP: 121/72 (07-24-21 @ 12:18) (111/71 - 124/76)  RR: 17 (07-24-21 @ 12:18) (14 - 17)  SpO2: 100% (07-24-21 @ 12:18) (99% - 100%)  Wt(kg): --  I&O's Summary    23 Jul 2021 07:01  -  24 Jul 2021 07:00  --------------------------------------------------------  IN: 0 mL / OUT: 975 mL / NET: -975 mL    24 Jul 2021 07:01  -  24 Jul 2021 18:46  --------------------------------------------------------  IN: 0 mL / OUT: 200 mL / NET: -200 mL        REVIEW OF SYSTEMS: denies fever, chills, SOB, palpitations, chest pain, abdominal pain, nausea, vomitting, diarrhea, constipation, dizziness    MEDICATIONS  (STANDING):  cefTRIAXone   IVPB      cefTRIAXone   IVPB 1000 milliGRAM(s) IV Intermittent every 24 hours  dextrose 5% + sodium chloride 0.9%. 1000 milliLiter(s) (60 mL/Hr) IV Continuous <Continuous>  polyethylene glycol 3350 17 Gram(s) Oral daily  senna 2 Tablet(s) Oral at bedtime    MEDICATIONS  (PRN):  acetaminophen   Tablet .. 650 milliGRAM(s) Oral every 4 hours PRN Mild Pain (1 - 3)  traMADol 50 milliGRAM(s) Oral every 6 hours PRN Moderate Pain (4 - 6)  traMADol 75 milliGRAM(s) Oral every 6 hours PRN Severe Pain (7 - 10)      PHYSICAL EXAM:  GENERAL: NAD, well-groomed, well-developed  HEAD:  Atraumatic, Normocephalic  EYES: EOMI, PERRLA, conjunctiva and sclera clear  ENMT: No tonsillar erythema, exudates, or enlargement; Moist mucous membranes, Good dentition, No lesions  NECK: Supple, No JVD, Normal thyroid  NERVOUS SYSTEM:  Alert & Oriented X3, Good concentration; Motor Strength 5/5 B/L upper and lower extremities; DTRs 2+ intact and symmetric  CHEST/LUNG: Clear to percussion bilaterally; No rales, rhonchi, wheezing, or rubs  HEART: Regular rate and rhythm; No murmurs, rubs, or gallops  ABDOMEN: Soft, Nontender, Nondistended; Bowel sounds present  EXTREMITIES:  2+ Peripheral Pulses, No clubbing, cyanosis, or edema  LYMPH: No lymphadenopathy noted  SKIN: No rashes or lesions  LABS:                        13.9   5.81  )-----------( 192      ( 24 Jul 2021 06:45 )             42.1     07-24    139  |  106  |  34<H>  ----------------------------<  84  4.2   |  27  |  0.98    Ca    9.8      24 Jul 2021 06:45      PT/INR - ( 23 Jul 2021 08:04 )   PT: 12.6 sec;   INR: 1.06 ratio         PTT - ( 23 Jul 2021 08:04 )  PTT:29.0 sec    CAPILLARY BLOOD GLUCOSE

## 2021-07-25 RX ADMIN — CEFTRIAXONE 100 MILLIGRAM(S): 500 INJECTION, POWDER, FOR SOLUTION INTRAMUSCULAR; INTRAVENOUS at 22:19

## 2021-07-25 NOTE — PROGRESS NOTE ADULT - ASSESSMENT
Patient is a 55y old  Female with PMHx of hypertension, bladder masses, questionable bladder cancer, and left-sided hydronephrosis presents to the ER for evaluation of left-sided chest pain and left flank pain x2 weeks. Patient states that she has been feeling left-sided aching chest pain wrapping around to her flank and down the left side of her abdomen. Patient reports that she has history of similar symptoms in the past.  Sterling was recently diagnosed with a UTI last month, and states that she was treated for pyelonephritis and completed her full course of antibiotics. ON admission, she has no fever or Leukocytosis but positive urine analysis. The CT abd/pelvis shows Severe end-stage chronic left hydroureteronephrosis without obstructing calculus presumably related to bladder neoplasm. She has started on Ceftriaxone and the ID consult requested to assist with further evaluation and antibiotic management.        #Complicated  UTI  - in the setting of Right PCN- s/p change Right PCNT 7/23/21  # Chronic left hydroureteronephrosis without obstructing calculus   # Bladder cancer    would recommend:    1. Pain management as needed  2. Continue Ceftriaxone 1 g daily, until 7/29/21  3. Monitor  kidney function   4. Hem/.Onc follow up    d/w patient  and Nursing staff    Attending Attestation:    Spent more than 35 minutes on total encounter, more than 50 % of the visit was spent counseling and/or coordinating care by the Attending physician. Patient is a 55y old  Female with PMHx of hypertension, bladder masses, questionable bladder cancer, and left-sided hydronephrosis presents to the ER for evaluation of left-sided chest pain and left flank pain x2 weeks. Patient states that she has been feeling left-sided aching chest pain wrapping around to her flank and down the left side of her abdomen. Patient reports that she has history of similar symptoms in the past.  Sterling was recently diagnosed with a UTI last month, and states that she was treated for pyelonephritis and completed her full course of antibiotics. ON admission, she has no fever or Leukocytosis but positive urine analysis. The CT abd/pelvis shows Severe end-stage chronic left hydroureteronephrosis without obstructing calculus presumably related to bladder neoplasm. She has started on Ceftriaxone and the ID consult requested to assist with further evaluation and antibiotic management.        #Complicated  UTI  - in the setting of Right PCN- s/p change Right PCNT 7/23/21  # Chronic left hydroureteronephrosis without obstructing calculus   # Bladder cancer    would recommend:    1. Please IR follow up to rep[osition the Right PCNT since its not draining well  2. Pain management as needed  3. Continue Ceftriaxone 1 g daily, until 7/29/21., may change to oral Abx on discharge   4. Monitor  kidney function   5. Hem/.Onc follow up    d/w patient  and Nursing staff    Attending Attestation:    Spent more than 35 minutes on total encounter, more than 50 % of the visit was spent counseling and/or coordinating care by the Attending physician.

## 2021-07-25 NOTE — PROGRESS NOTE ADULT - SUBJECTIVE AND OBJECTIVE BOX
Patient is seen and examined at the bed side, is afebrile.  She is c/p Left sided pressure.      \REVIEW OF SYSTEMS: All other review systems are negative      ALLERGIES: No Known Allergies      Vital Signs Last 24 Hrs  T(C): 36.4 (2021 13:50), Max: 36.7 (2021 05:20)  T(F): 97.6 (2021 13:50), Max: 98 (2021 05:20)  HR: 72 (2021 13:50) (55 - 72)  BP: 117/83 (2021 13:50) (117/83 - 134/74)  BP(mean): --  RR: 16 (2021 13:50) (16 - 16)  SpO2: 100% (2021 13:50) (100% - 100%)      PHYSICAL EXAM:  GENERAL: Not in distress   CHEST/LUNG: Not using accessory muscles   HEART: s1 and s2 present  ABDOMEN:  Nontender and  Nondistended  : Right PCN in placed, draining yellow color urine  EXTREMITIES: No pedal  edema  CNS: Awake and Alert      LABS: No new Labs                        13.9   5.81  )-----------( 192      ( 2021 06:45 )             42.1                  15.0   4.09  )-----------( 225      ( 2021 08:04 )             45.7         07-24    139  |  106  |  34<H>  ----------------------------<  84  4.2   |  27  |  0.98    Ca    9.8      2021 06:45      07-21    137  |  102  |  35<H>  ----------------------------<  82  3.9   |  28  |  1.29    Ca    10.1      2021 07:11  Phos  3.3     07-20  Mg     2.3     07-20    TPro  7.8  /  Alb  3.8  /  TBili  0.6  /  DBili  x   /  AST  18  /  ALT  16  /  AlkPhos  93  07-20      Urinalysis Basic - ( 2021 16:19 )  Color: Yellow / Appearance: very cloudy / S.015 / pH: x  Gluc: x / Ketone: Negative  / Bili: Negative / Urobili: Negative   Blood: x / Protein: 500 mg/dL / Nitrite: Negative   Leuk Esterase: Small / RBC: 25-50 /HPF / WBC 26-50 /HPF   Sq Epi: x / Non Sq Epi: Few /HPF / Bacteria: Moderate /HPF        MEDICATIONS  (STANDING):    cefTRIAXone   IVPB      cefTRIAXone   IVPB 1000 milliGRAM(s) IV Intermittent every 24 hours  dextrose 5% + sodium chloride 0.9%. 1000 milliLiter(s) (60 mL/Hr) IV Continuous <Continuous>  polyethylene glycol 3350 17 Gram(s) Oral daily  senna 2 Tablet(s) Oral at bedtime      RADIOLOGY & ADDITIONAL TESTS:     CT Abdomen and Pelvis No Cont (21 @ 17:18) Significantly limited by lack of IV contrast.  Severe end-stage chronic left hydroureteronephrosis without obstructing calculus presumably related to bladder neoplasm.  Poorly defined bladder soft tissue density consistent with afforded history of bladder neoplasm.      MICROBIOLOGY DATA:    Culture - Urine (21 @ 22:16)   Specimen Source: Clean Catch Clean Catch (Midstream)   Culture Results: <10,000 CFU/mL Normal Urogenital Vandana     COVID-19 PCR . (21 @ 19:33)   COVID-19 PCR: NotDetec:            Patient is seen and examined at the bed side, is afebrile.  She is feeling better but c/o Right PCNT is not draining well.      \REVIEW OF SYSTEMS: All other review systems are negative      ALLERGIES: No Known Allergies      Vital Signs Last 24 Hrs  T(C): 36.4 (2021 13:50), Max: 36.7 (2021 05:20)  T(F): 97.6 (2021 13:50), Max: 98 (2021 05:20)  HR: 72 (2021 13:50) (55 - 72)  BP: 117/83 (2021 13:50) (117/83 - 134/74)  BP(mean): --  RR: 16 (2021 13:50) (16 - 16)  SpO2: 100% (2021 13:50) (100% - 100%)      PHYSICAL EXAM:  GENERAL: Not in distress   CHEST/LUNG: Not using accessory muscles   HEART: s1 and s2 present  ABDOMEN:  Nontender and  Nondistended  : Right PCN in placed, draining yellow color urine  EXTREMITIES: No pedal  edema  CNS: Awake and Alert      LABS: No new Labs                        13.9   5.81  )-----------( 192      ( 2021 06:45 )             42.1                  15.0   4.09  )-----------( 225      ( 2021 08:04 )             45.7         07-24    139  |  106  |  34<H>  ----------------------------<  84  4.2   |  27  |  0.98    Ca    9.8      2021 06:45      07-21    137  |  102  |  35<H>  ----------------------------<  82  3.9   |  28  |  1.29    Ca    10.1      2021 07:11  Phos  3.3     07-20  Mg     2.3     07-20    TPro  7.8  /  Alb  3.8  /  TBili  0.6  /  DBili  x   /  AST  18  /  ALT  16  /  AlkPhos  93  07-20      Urinalysis Basic - ( 2021 16:19 )  Color: Yellow / Appearance: very cloudy / S.015 / pH: x  Gluc: x / Ketone: Negative  / Bili: Negative / Urobili: Negative   Blood: x / Protein: 500 mg/dL / Nitrite: Negative   Leuk Esterase: Small / RBC: 25-50 /HPF / WBC 26-50 /HPF   Sq Epi: x / Non Sq Epi: Few /HPF / Bacteria: Moderate /HPF        MEDICATIONS  (STANDING):    cefTRIAXone   IVPB      cefTRIAXone   IVPB 1000 milliGRAM(s) IV Intermittent every 24 hours  dextrose 5% + sodium chloride 0.9%. 1000 milliLiter(s) (60 mL/Hr) IV Continuous <Continuous>  polyethylene glycol 3350 17 Gram(s) Oral daily  senna 2 Tablet(s) Oral at bedtime      RADIOLOGY & ADDITIONAL TESTS:     CT Abdomen and Pelvis No Cont (21 @ 17:18) Significantly limited by lack of IV contrast.  Severe end-stage chronic left hydroureteronephrosis without obstructing calculus presumably related to bladder neoplasm.  Poorly defined bladder soft tissue density consistent with afforded history of bladder neoplasm.      MICROBIOLOGY DATA:    Culture - Urine (21 @ 22:16)   Specimen Source: Clean Catch Clean Catch (Midstream)   Culture Results: <10,000 CFU/mL Normal Urogenital Vandana     COVID-19 PCR . (21 @ 19:33)   COVID-19 PCR: NotDetec:

## 2021-07-25 NOTE — PROGRESS NOTE ADULT - ASSESSMENT
_________________________________________________________________________________________  ========>>  M E D I C A L   A T T E N D I N G    F O L L O W  U P  N O T E  <<=========  -----------------------------------------------------------------------------------------------------    - Patient seen and examined by me earlier today.    - In summary,  JEF HOUSE is a 55y year old woman admitted with Lft flank pain   - Patient today overall doing ok, comfortable     pt post nephrostomy replacement . stated not much urine in bag but making urine well otherwise     ==================>> REVIEW OF SYSTEM <<=================    GEN: no fever, no chills, no signif pain  reported   RESP: no SOB, no cough, no sputum  CVS: no chest pain, no palpitations, no edema  GI: no abdominal pain, no nausea  : no dysuria, no frequency, no hematuria reported    Neuro: no headache, no dizziness  Derm : no itching, no rash    ==================>> PHYSICAL EXAM <<=================    GEN: A&O X 3 , NAD , comfortable, pleasant, calm , cachectic , alert and interactive, a bit anxious    HEENT: NCAT, PERRL, MMM, hearing intact  Neck: supple , no JVD appreciated  CVS: S1S2 , regular , No M/R/G appreciated  PULM: CTA B/L,  no W/R/R appreciated  ABD.: soft. non tender, non distended  Extrem: intact pulses , no edema     nephrostomy to bag with yellow urine   PSYCH : normal mood,  not anxious                              ( Note written / Date of service 07-25-21 )    ==================>> MEDICATIONS <<====================    cefTRIAXone   IVPB      cefTRIAXone   IVPB 1000 milliGRAM(s) IV Intermittent every 24 hours  dextrose 5% + sodium chloride 0.9%. 1000 milliLiter(s) IV Continuous <Continuous>  polyethylene glycol 3350 17 Gram(s) Oral daily  senna 2 Tablet(s) Oral at bedtime    MEDICATIONS  (PRN):  acetaminophen   Tablet .. 650 milliGRAM(s) Oral every 4 hours PRN Mild Pain (1 - 3)  traMADol 50 milliGRAM(s) Oral every 6 hours PRN Moderate Pain (4 - 6)  traMADol 75 milliGRAM(s) Oral every 6 hours PRN Severe Pain (7 - 10)    ___________  Active diet:  Diet, Regular:   DASH/TLC Sodium & Cholesterol Restricted  ___________________    ==================>> VITAL SIGNS <<==================    Vital Signs Last 24 HrsT(C): 36.4 (07-25-21 @ 13:50)  T(F): 97.6 (07-25-21 @ 13:50), Max: 98 (07-25-21 @ 05:20)  HR: 72 (07-25-21 @ 13:50) (55 - 72)  BP: 117/83 (07-25-21 @ 13:50)  RR: 16 (07-25-21 @ 13:50) (16 - 16)  SpO2: 100% (07-25-21 @ 13:50) (100% - 100%)       ==================>> LAB AND IMAGING <<==================                        13.9   5.81  )-----------( 192      ( 24 Jul 2021 06:45 )             42.1        07-24    139  |  106  |  34<H>  ----------------------------<  84  4.2   |  27  |  0.98    Ca    9.8      24 Jul 2021 06:45    WBC count:   5.81 <<== ,  4.09 <<== ,  4.51 <<==   Hemoglobin:   13.9 <<==,  15.0 <<==,  14.5 <<==  platelets:  192 <==, 225 <==, 211 <==, 214 <==, 207 <==    Creatinine:  0.98  <<==, 1.20  <<==, 1.29  <<==, 1.10  <<==, 1.17  <<==  Sodium:   139  <==, 139  <==, 137  <==, 139  <==, 137  <==    _______________________  C U L T U R E S :    Culture - Urine (collected 19 Jul 2021 22:16)  Source: Clean Catch Clean Catch (Midstream)  Final Report (20 Jul 2021 20:27):    <10,000 CFU/mL Normal Urogenital Vandana    COVID-19 PCR: NotDetec (07-22-21 @ 12:25)  COVID-19 PCR: NotDetec (07-19-21 @ 19:33)    ___________________________________________________________________________________  ===============>>  A S S E S S M E N T   A N D   P L A N <<===============  ------------------------------------------------------------------------------------------    · Assessment	  55 year old female with PMHx of hypertension,  pacemaker for sick sinus syndrome ([facility], ) bladder masses, questionable bladder cancer,  left-sided hydronephrosis and Rt PCN  presents to the ED with complaints of left-sided chest pain and left flank pain x2 weeks. Pt follows with Dr. Trinidad. Per  while patient was initially thought to have T1 disease in bladder, appearance on recent imaging may suggest T3. However, recently had PET on 7/16/21 demonstrating new FDG-avid left para-aortic LN in RP space, consistent w/ possible met    Problem/Plan - 1:  ·  Problem: complex UTI and Pyelonephritis.    - Patient presented with left flank pain. ( though left flank pain likely due to cysts and not pyelo)   - follow cultures from urine and nephrostomy  - Appreciate Urology and IR follow up   - Appreciate ID follow up  - post tube change >> monitor output     Problem/Plan - 2:  ·  Problem: Nephrostomy status.  Plan: - Patient has past history of bladder mass.   - CT abdomen showing Severe end-stage chronic left hydroureteronephrosis without obstructing calculus presumably related to bladder neoplasm. Poorly defined bladder soft tissue density consistent with afforded history of bladder neoplasm.  - Appreciate Urology recs -        left kidney no plans for any intervention as no expectation for recovery as noted and discussed   -  and other providers have discussed with pt's Uro: Dr. Trinidad >> trying to get report of PET scan for possible Bx of RP lymph node >> plan for IR to review PET and plan for possible LN Bx       still awaiting to have CD brought into hospital and loaded to PACS ..!     Problem/Plan - 3:  ·  Problem: Chest pain.  Plan: - Patient c/o left chest pain with left flank pain   - EKG showing sinus bradycardia  - Dr Rosario consulted- pt had a recent echo and stress test in Dr Rosario office was negative  - CP not likely cardiac as above  - no further cardiac work up needed.     Problem/Plan - 4:  ·  Problem: Bladder mass.    as above  - A oncology group on board   awaiting PET..     Problem/Plan - 5:  ·  Problem: HTN   - Patient has past  hsitory of HTN controlled on diet.    Problem/Plan - 6:  ·  Problem: Asthma.    - Patient has past history of asthma not on any meds and well controlled     GI/ DVT prophylaxis   --------------------------------------------  Case discussed with pt,   Education given on findings and plan of care  ___________________________  H. NIEVES Ochoa.  Pager: [phone]

## 2021-07-26 LAB
ANION GAP SERPL CALC-SCNC: 7 MMOL/L — SIGNIFICANT CHANGE UP (ref 5–17)
BUN SERPL-MCNC: 34 MG/DL — HIGH (ref 7–18)
CALCIUM SERPL-MCNC: 10.2 MG/DL — SIGNIFICANT CHANGE UP (ref 8.4–10.5)
CHLORIDE SERPL-SCNC: 105 MMOL/L — SIGNIFICANT CHANGE UP (ref 96–108)
CO2 SERPL-SCNC: 26 MMOL/L — SIGNIFICANT CHANGE UP (ref 22–31)
CREAT SERPL-MCNC: 1.24 MG/DL — SIGNIFICANT CHANGE UP (ref 0.5–1.3)
GLUCOSE SERPL-MCNC: 87 MG/DL — SIGNIFICANT CHANGE UP (ref 70–99)
HCT VFR BLD CALC: 43.6 % — SIGNIFICANT CHANGE UP (ref 34.5–45)
HGB BLD-MCNC: 14.1 G/DL — SIGNIFICANT CHANGE UP (ref 11.5–15.5)
MCHC RBC-ENTMCNC: 29.8 PG — SIGNIFICANT CHANGE UP (ref 27–34)
MCHC RBC-ENTMCNC: 32.3 GM/DL — SIGNIFICANT CHANGE UP (ref 32–36)
MCV RBC AUTO: 92.2 FL — SIGNIFICANT CHANGE UP (ref 80–100)
NRBC # BLD: 0 /100 WBCS — SIGNIFICANT CHANGE UP (ref 0–0)
PLATELET # BLD AUTO: 203 K/UL — SIGNIFICANT CHANGE UP (ref 150–400)
POTASSIUM SERPL-MCNC: 3.8 MMOL/L — SIGNIFICANT CHANGE UP (ref 3.5–5.3)
POTASSIUM SERPL-SCNC: 3.8 MMOL/L — SIGNIFICANT CHANGE UP (ref 3.5–5.3)
RBC # BLD: 4.73 M/UL — SIGNIFICANT CHANGE UP (ref 3.8–5.2)
RBC # FLD: 12.9 % — SIGNIFICANT CHANGE UP (ref 10.3–14.5)
SODIUM SERPL-SCNC: 138 MMOL/L — SIGNIFICANT CHANGE UP (ref 135–145)
WBC # BLD: 3.98 K/UL — SIGNIFICANT CHANGE UP (ref 3.8–10.5)
WBC # FLD AUTO: 3.98 K/UL — SIGNIFICANT CHANGE UP (ref 3.8–10.5)

## 2021-07-26 RX ORDER — ENOXAPARIN SODIUM 100 MG/ML
40 INJECTION SUBCUTANEOUS DAILY
Refills: 0 | Status: DISCONTINUED | OUTPATIENT
Start: 2021-07-26 | End: 2021-07-27

## 2021-07-26 RX ADMIN — Medication 650 MILLIGRAM(S): at 12:32

## 2021-07-26 RX ADMIN — Medication 650 MILLIGRAM(S): at 18:16

## 2021-07-26 RX ADMIN — Medication 650 MILLIGRAM(S): at 15:21

## 2021-07-26 RX ADMIN — CEFTRIAXONE 100 MILLIGRAM(S): 500 INJECTION, POWDER, FOR SOLUTION INTRAMUSCULAR; INTRAVENOUS at 23:35

## 2021-07-26 RX ADMIN — Medication 650 MILLIGRAM(S): at 19:25

## 2021-07-26 NOTE — PROGRESS NOTE ADULT - NUTRITIONAL ASSESSMENT
This patient has been assessed with a concern for Malnutrition and has been determined to have a diagnosis/diagnoses of Severe protein-calorie malnutrition and Underweight (BMI < 19).    This patient is being managed with:   Diet Regular-  DASH/TLC {Sodium & Cholesterol Restricted}  Entered: Jul 19 2021 10:34PM    
This patient has been assessed with a concern for Malnutrition and has been determined to have a diagnosis/diagnoses of Severe protein-calorie malnutrition and Underweight (BMI < 19).    This patient is being managed with:   Diet Regular-  DASH/TLC {Sodium & Cholesterol Restricted}  Entered: Jul 19 2021 10:34PM    
This patient has been assessed with a concern for Malnutrition and has been determined to have a diagnosis/diagnoses of Severe protein-calorie malnutrition and Underweight (BMI < 19).    This patient is being managed with:   Diet NPO after Midnight-     NPO Start Date: 22-Jul-2021   NPO Start Time: 23:59  Entered: Jul 22 2021 10:04AM    Diet Regular-  DASH/TLC {Sodium & Cholesterol Restricted}  Entered: Jul 19 2021 10:34PM    
This patient has been assessed with a concern for Malnutrition and has been determined to have a diagnosis/diagnoses of Severe protein-calorie malnutrition and Underweight (BMI < 19).    This patient is being managed with:   Diet NPO after Midnight-     NPO Start Date: 22-Jul-2021   NPO Start Time: 23:59  Entered: Jul 22 2021 10:04AM    Diet Regular-  DASH/TLC {Sodium & Cholesterol Restricted}  Entered: Jul 19 2021 10:34PM

## 2021-07-26 NOTE — PROGRESS NOTE ADULT - PROBLEM SELECTOR PLAN 3
never biopsied in the past per pt  uro recc - at this time, would not recommend bladder biopsy before LND biopsy as tissues dx of new LND focus would change treatment plan.  heme/onc QMA

## 2021-07-26 NOTE — PROGRESS NOTE ADULT - PROBLEM SELECTOR PLAN 1
Patient presented with left flank pain - no fevers or leukocytosis  CT abdomen findings reviewed showing Severe end-stage chronic left hydroureteronephrosis without obstructing calculus presumably related to bladder neoplasm. Poorly defined bladder soft tissue density consistent with afforded history of bladder neoplasm  UA weakly positive, urine culture no growth  per urology no acute intervention indicated  s/p nephrostomy tube change 7/23  ceftriaxone till 7/29 per ID Dr. Rodriguez

## 2021-07-26 NOTE — PROGRESS NOTE ADULT - PROBLEM/PLAN-6
DISPLAY PLAN FREE TEXT
Yes
DISPLAY PLAN FREE TEXT
DISPLAY PLAN FREE TEXT

## 2021-07-26 NOTE — PROGRESS NOTE ADULT - PROBLEM SELECTOR PLAN 7
patient has capacity for healthcare decision making  FULL CODE at this time
Patient has past history of asthma not on any meds and well controlled   on PE chest is clear
Patient has past history of asthma not on any meds and well controlled   on PE chest is clear
- Patient has past history of asthma not on any meds and well controlled   - on PE chest is clear
Patient has past history of asthma not on any meds and well controlled   on PE chest is clear

## 2021-07-26 NOTE — PROGRESS NOTE ADULT - ASSESSMENT
55 year old female with past medical history of HTN, SSS s/p PPM, suspected bladder CA c/b left sided hydronephrosis and right percutaneous nephrostomy tube placement who presented to ED with c/o flank pain x 2 weeks. According to patient, she underwent chemotherapeutic treatment at both [facility] and [facility], but was only partially treated. She was instructed to have PET CT and subsequent bladder biopsy by her urologist Dr. Trinidad. Heme/Onc and IR consulted -   while patient was initially thought to have T1 disease in bladder, appearance on recent imaging may suggest T3. However, recently had PET on 7/16/21 demonstrating new FDG-avid left para-aortic LN in RP space, consistent w/ possible mets. Urine culture results from clear catch with no growth. Patient recommended to have nephrostomy tube exchange and heme/onc would not recommend bladder biopsy before LND biopsy as new LND focus would change treatment plan. Obtained imaging from [facility] and uploaded to PACS - IR to weigh in on whether or not LN biopsy is possible. Per Dr. Trinidad, patient likely had understaged CA as she had left hydronephrosis at the time of presentation. Molecular studies were never completed, however, she had a left RP LN biopsy in October that was negative for malignancy. She had neoadjuvant chemo with [place]/Cis x 2 cycles which was discontinued due to pyelonephritis. She is to continue ceftriaxone until 7/29.

## 2021-07-26 NOTE — PROGRESS NOTE ADULT - ASSESSMENT
complete note to follow   Assessment and Plan:   · Assessment	    Pt is a 55 year old female with PMHx of hypertension,  pacemaker for sick sinus syndrome ([facility]) bladder masses, questionable bladder cancer,  left-sided hydronephrosis and Rt PCN  presents to the ED with complaints of left-sided chest pain and left flank pain x2 weeks. Patient states that she has been feeling left-sided aching chest pain wrapping around to her flank and down the left side of her abdomen.   Patient reports that she has history of similar symptoms in the past.  Patient states that she was recently diagnosed with a UTI last month, and states that she was treated for pyelonephritis and completed her full course of antibiotics. She also endorses that her PCN tube isnot draining very well but it drains better when flushing it.      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 Trinidad [phone]  CT shows end-stage Left hydro, Right NT no hydro and extensive poorly defined lobulated bladder mass  Rec's:  -ALEJANDRINA Camp spoke with Dr. Trinidad and the pt originally followed with [facility] and then [facility] and then with her, the pt originally presented as a T1, but likely understaged as she p/w left hydro at that time. Moleculart studies were NOT done. She had a Left Retroperitoneal node Bx'd in 10/2020 which was (-) for malignancy. She was treated with neoadjuvant chemo with gem/cis x 2 cycles and then developed pyelo and did not restart chemo and lost to follow-up. She had a repeat PET 7/16/21 which showed a para-aortic LN, it is recommended that if it is accessible for biopsy to have it done to prove if met or not. If Bx is negative will need cystoscopy with biopsy to send out for molecular studies  -On abx for pyelonephritis, ID following  -Appreciate Urology consult, recommended NT exchange  -s/p Right NT exchange  -Await IR assessment if feasible to biopsy para-aortic node, if unable to bx ok from Onc standpoint to d/c and f/u as outpt  -pt has not f/u with [facility] and would like to f/u in our office for Medical Oncology  -further recommendations pending above  -can f/u with Dr. Garcia / [facility] upon discharge    Thank you for the referral. Will continue to monitor the patient.  Please call with any questions [phone]  Above reviewed with Attending Dr. Colindres

## 2021-07-26 NOTE — PROGRESS NOTE ADULT - PROBLEM SELECTOR PLAN 8
Ceftriaxone till 7/29 if can go home on PO ABT, pt will d/c home and f/u with own heme/onc and urology  pending decision for bx  IR requested to have PET scan result - uploaded to PACS

## 2021-07-26 NOTE — PROGRESS NOTE ADULT - SUBJECTIVE AND OBJECTIVE BOX
Patient is a 55y old  Female who presents with a chief complaint of Lt flank pain and chest pain (23 Jul 2021 12:50)      SUBJECTIVE / OVERNIGHT EVENTS:    ADDITIONAL REVIEW OF SYSTEMS:    MEDICATIONS  (STANDING):  cefTRIAXone   IVPB      cefTRIAXone   IVPB 1000 milliGRAM(s) IV Intermittent every 24 hours  dextrose 5% + sodium chloride 0.9%. 1000 milliLiter(s) (60 mL/Hr) IV Continuous <Continuous>  polyethylene glycol 3350 17 Gram(s) Oral daily  senna 2 Tablet(s) Oral at bedtime    MEDICATIONS  (PRN):  acetaminophen   Tablet .. 650 milliGRAM(s) Oral every 4 hours PRN Mild Pain (1 - 3)  traMADol 50 milliGRAM(s) Oral every 6 hours PRN Moderate Pain (4 - 6)  traMADol 75 milliGRAM(s) Oral every 6 hours PRN Severe Pain (7 - 10)      Vital Signs Last 24 Hrs  T(C): 36.9 (26 Jul 2021 11:56), Max: 36.9 (26 Jul 2021 11:56)  T(F): 98.4 (26 Jul 2021 11:56), Max: 98.4 (26 Jul 2021 11:56)  HR: 66 (26 Jul 2021 11:56) (53 - 89)  BP: 123/72 (26 Jul 2021 11:56) (113/69 - 130/75)  BP(mean): --  RR: 14 (26 Jul 2021 11:56) (14 - 18)  SpO2: 100% (26 Jul 2021 11:56) (99% - 100%)    LABS:                        14.1   3.98  )-----------( 203      ( 26 Jul 2021 07:30 )             43.6     07-26    138  |  105  |  34<H>  ----------------------------<  87  3.8   |  26  |  1.24    Ca    10.2      26 Jul 2021 07:30            COVID-19 PCR: NotDetec (22 Jul 2021 12:25)  COVID-19 PCR: NotDetec (19 Jul 2021 19:33)       Patient is a 55y old  Female who presents with a chief complaint of Lt flank pain and chest pain (23 Jul 2021 12:50)      SUBJECTIVE / OVERNIGHT EVENTS: events noted. No new complaints. S/P NT exchange. Await IR input regarding LN biopsy      MEDICATIONS  (STANDING):  cefTRIAXone   IVPB      cefTRIAXone   IVPB 1000 milliGRAM(s) IV Intermittent every 24 hours  dextrose 5% + sodium chloride 0.9%. 1000 milliLiter(s) (60 mL/Hr) IV Continuous <Continuous>  polyethylene glycol 3350 17 Gram(s) Oral daily  senna 2 Tablet(s) Oral at bedtime    MEDICATIONS  (PRN):  acetaminophen   Tablet .. 650 milliGRAM(s) Oral every 4 hours PRN Mild Pain (1 - 3)  traMADol 50 milliGRAM(s) Oral every 6 hours PRN Moderate Pain (4 - 6)  traMADol 75 milliGRAM(s) Oral every 6 hours PRN Severe Pain (7 - 10)      Vital Signs Last 24 Hrs  T(C): 36.9 (26 Jul 2021 11:56), Max: 36.9 (26 Jul 2021 11:56)  T(F): 98.4 (26 Jul 2021 11:56), Max: 98.4 (26 Jul 2021 11:56)  HR: 66 (26 Jul 2021 11:56) (53 - 89)  BP: 123/72 (26 Jul 2021 11:56) (113/69 - 130/75)  BP(mean): --  RR: 14 (26 Jul 2021 11:56) (14 - 18)  SpO2: 100% (26 Jul 2021 11:56) (99% - 100%)    GEN: NAD; A and O x 3  LUNGS: CTA B/L  HEART: S1 S2, left CW pacemaker  ABDOMEN: soft, non-tender, non-distended, + BS, right NT  EXTREMITIES: no edema  NERVOUS SYSTEM:  Awake and alert; no focal neuro deficits    LABS:                        14.1   3.98  )-----------( 203      ( 26 Jul 2021 07:30 )             43.6     07-26    138  |  105  |  34<H>  ----------------------------<  87  3.8   |  26  |  1.24    Ca    10.2      26 Jul 2021 07:30        COVID-19 PCR: NotDetec (22 Jul 2021 12:25)  COVID-19 PCR: NotDetec (19 Jul 2021 19:33)

## 2021-07-26 NOTE — PROGRESS NOTE ADULT - SUBJECTIVE AND OBJECTIVE BOX
DAILY PROGRESS NOTE:         24 hr events:  rena   s/p RNT exchange 7/24. draining well.     Objective:    Vital Signs Last 24 Hrs  T(C): 36.9 (26 Jul 2021 11:56), Max: 36.9 (26 Jul 2021 11:56)  T(F): 98.4 (26 Jul 2021 11:56), Max: 98.4 (26 Jul 2021 11:56)  HR: 66 (26 Jul 2021 11:56) (53 - 89)  BP: 123/72 (26 Jul 2021 11:56) (113/69 - 130/75)  BP(mean): --  RR: 14 (26 Jul 2021 11:56) (14 - 18)  SpO2: 100% (26 Jul 2021 11:56) (99% - 100%)    I&O's Detail    25 Jul 2021 07:01  -  26 Jul 2021 07:00  --------------------------------------------------------  IN:  Total IN: 0 mL    OUT:    Nephrostomy Tube (mL): 100 mL    Voided (mL): 900 mL  Total OUT: 1000 mL    Total NET: -1000 mL      26 Jul 2021 07:01  -  26 Jul 2021 16:18  --------------------------------------------------------  IN:  Total IN: 0 mL    OUT:    Nephrostomy Tube (mL): 150 mL  Total OUT: 150 mL    Total NET: -150 mL          Physical Exam:    General: NAD, well-nourished  HEENT: Atraumatic, EOMI  Resp: Breathing comfortably on RA  : R PCN in place, clear urine   Psych: AOx3  Neuro: No focal deficits       Laboratory Results:                          14.1   3.98  )-----------( 203      ( 26 Jul 2021 07:30 )             43.6     07-26    138  |  105  |  34<H>  ----------------------------<  87  3.8   |  26  |  1.24    Ca    10.2      26 Jul 2021 07:30

## 2021-07-26 NOTE — PROGRESS NOTE ADULT - SUBJECTIVE AND OBJECTIVE BOX
Patient is seen and examined at the bed side, is afebrile.  The creatinine  is trending  back up. As per Urology note Right PCNT is draining well.       \REVIEW OF SYSTEMS: All other review systems are negative      ALLERGIES: No Known Allergies      Vital Signs Last 24 Hrs  T(C): 36.9 (2021 11:56), Max: 36.9 (2021 11:56)  T(F): 98.4 (2021 11:56), Max: 98.4 (2021 11:56)  HR: 66 (2021 11:56) (53 - 89)  BP: 123/72 (2021 11:56) (113/69 - 130/75)  BP(mean): --  RR: 14 (2021 11:56) (14 - 18)  SpO2: 100% (2021 11:56) (99% - 100%)      PHYSICAL EXAM:  GENERAL: Not in distress   CHEST/LUNG: Not using accessory muscles   HEART: s1 and s2 present  ABDOMEN:  Nontender and  Nondistended  : Right PCN in placed, draining yellow color urine  EXTREMITIES: No pedal  edema  CNS: Awake and Alert      LABS:                         14.1   3.98  )-----------( 203      ( 2021 07:30 )             43.6                           13.9   5.81  )-----------( 192      ( 2021 06:45 )             42.1       07-26    138  |  105  |  34<H>  ----------------------------<  87  3.8   |  26  |  1.24    Ca    10.2      2021 07:30    07-24    139  |  106  |  34<H>  ----------------------------<  84  4.2   |  27  |  0.98    Ca    9.8      2021 06:45    TPro  7.8  /  Alb  3.8  /  TBili  0.6  /  DBili  x   /  AST  18  /  ALT  16  /  AlkPhos  93  07-20      Urinalysis Basic - ( 2021 16:19 )  Color: Yellow / Appearance: very cloudy / S.015 / pH: x  Gluc: x / Ketone: Negative  / Bili: Negative / Urobili: Negative   Blood: x / Protein: 500 mg/dL / Nitrite: Negative   Leuk Esterase: Small / RBC: 25-50 /HPF / WBC 26-50 /HPF   Sq Epi: x / Non Sq Epi: Few /HPF / Bacteria: Moderate /HPF        MEDICATIONS  (STANDING):    cefTRIAXone   IVPB      cefTRIAXone   IVPB 1000 milliGRAM(s) IV Intermittent every 24 hours  dextrose 5% + sodium chloride 0.9%. 1000 milliLiter(s) (60 mL/Hr) IV Continuous <Continuous>  enoxaparin Injectable 40 milliGRAM(s) SubCutaneous daily  polyethylene glycol 3350 17 Gram(s) Oral daily  senna 2 Tablet(s) Oral at bedtime      RADIOLOGY & ADDITIONAL TESTS:     CT Abdomen and Pelvis No Cont (21 @ 17:18) Significantly limited by lack of IV contrast.  Severe end-stage chronic left hydroureteronephrosis without obstructing calculus presumably related to bladder neoplasm.  Poorly defined bladder soft tissue density consistent with afforded history of bladder neoplasm.      MICROBIOLOGY DATA:    Culture - Urine (21 @ 22:16)   Specimen Source: Clean Catch Clean Catch (Midstream)   Culture Results: <10,000 CFU/mL Normal Urogenital Vandana     COVID-19 PCR . (21 @ 19:33)   COVID-19 PCR: NotDetec:              Patient is seen and examined at the bed side, is afebrile.  The creatinine  is trending  back up. As per Urology note Right PCNT is draining well. She is s/p flushed the Right PCNT by IR this morning.       \REVIEW OF SYSTEMS: All other review systems are negative      ALLERGIES: No Known Allergies      Vital Signs Last 24 Hrs  T(C): 36.9 (2021 11:56), Max: 36.9 (2021 11:56)  T(F): 98.4 (2021 11:56), Max: 98.4 (2021 11:56)  HR: 66 (2021 11:56) (53 - 89)  BP: 123/72 (2021 11:56) (113/69 - 130/75)  BP(mean): --  RR: 14 (2021 11:56) (14 - 18)  SpO2: 100% (2021 11:56) (99% - 100%)      PHYSICAL EXAM:  GENERAL: Not in distress   CHEST/LUNG: Not using accessory muscles   HEART: s1 and s2 present  ABDOMEN:  Nontender and  Nondistended  : Right PCN in placed, draining yellow color urine  EXTREMITIES: No pedal  edema  CNS: Awake and Alert      LABS:                         14.1   3.98  )-----------( 203      ( 2021 07:30 )             43.6                           13.9   5.81  )-----------( 192      ( 2021 06:45 )             42.1       07-26    138  |  105  |  34<H>  ----------------------------<  87  3.8   |  26  |  1.24    Ca    10.2      2021 07:30    07-24    139  |  106  |  34<H>  ----------------------------<  84  4.2   |  27  |  0.98    Ca    9.8      2021 06:45    TPro  7.8  /  Alb  3.8  /  TBili  0.6  /  DBili  x   /  AST  18  /  ALT  16  /  AlkPhos  93  07-20      Urinalysis Basic - ( 2021 16:19 )  Color: Yellow / Appearance: very cloudy / S.015 / pH: x  Gluc: x / Ketone: Negative  / Bili: Negative / Urobili: Negative   Blood: x / Protein: 500 mg/dL / Nitrite: Negative   Leuk Esterase: Small / RBC: 25-50 /HPF / WBC 26-50 /HPF   Sq Epi: x / Non Sq Epi: Few /HPF / Bacteria: Moderate /HPF        MEDICATIONS  (STANDING):    cefTRIAXone   IVPB      cefTRIAXone   IVPB 1000 milliGRAM(s) IV Intermittent every 24 hours  dextrose 5% + sodium chloride 0.9%. 1000 milliLiter(s) (60 mL/Hr) IV Continuous <Continuous>  enoxaparin Injectable 40 milliGRAM(s) SubCutaneous daily  polyethylene glycol 3350 17 Gram(s) Oral daily  senna 2 Tablet(s) Oral at bedtime      RADIOLOGY & ADDITIONAL TESTS:     CT Abdomen and Pelvis No Cont (21 @ 17:18) Significantly limited by lack of IV contrast.  Severe end-stage chronic left hydroureteronephrosis without obstructing calculus presumably related to bladder neoplasm.  Poorly defined bladder soft tissue density consistent with afforded history of bladder neoplasm.      MICROBIOLOGY DATA:    Culture - Urine (21 @ 22:16)   Specimen Source: Clean Catch Clean Catch (Midstream)   Culture Results: <10,000 CFU/mL Normal Urogenital Vandana     COVID-19 PCR . (21 @ 19:33)   COVID-19 PCR: NotDetec:

## 2021-07-26 NOTE — PROGRESS NOTE ADULT - PROBLEM SELECTOR PLAN 4
per outpatient PET Scan: para aortic lymph nodes  IR requesting PET CT to be uploaded to PACS to ascertain if LN can be biopsied   CD brought to radiology

## 2021-07-26 NOTE — PROGRESS NOTE ADULT - SUBJECTIVE AND OBJECTIVE BOX
NP Note discussed with  Primary Attending    Patient is a 55y old  Female who presents with a chief complaint of LT flank pain (22 Jul 2021 14:24)      INTERVAL HPI/OVERNIGHT EVENTS: no acute events overnight, hemodynamically stable     MEDICATIONS  (STANDING):  cefTRIAXone   IVPB      cefTRIAXone   IVPB 1000 milliGRAM(s) IV Intermittent every 24 hours  dextrose 5% + sodium chloride 0.9%. 1000 milliLiter(s) (60 mL/Hr) IV Continuous <Continuous>  polyethylene glycol 3350 17 Gram(s) Oral daily  senna 2 Tablet(s) Oral at bedtime    MEDICATIONS  (PRN):  acetaminophen   Tablet .. 650 milliGRAM(s) Oral every 4 hours PRN Mild Pain (1 - 3)  traMADol 50 milliGRAM(s) Oral every 6 hours PRN Moderate Pain (4 - 6)  traMADol 75 milliGRAM(s) Oral every 6 hours PRN Severe Pain (7 - 10)      __________________________________________________  REVIEW OF SYSTEMS:    CONSTITUTIONAL: No fever,   EYES: no acute visual disturbances  NECK: No pain or stiffness  RESPIRATORY: No cough; No shortness of breath  CARDIOVASCULAR: No chest pain, no palpitations  GASTROINTESTINAL: No pain. No nausea or vomiting; No diarrhea   NEUROLOGICAL: No headache or numbness, no tremors  MUSCULOSKELETAL: No joint pain, no muscle pain  GENITOURINARY: no dysuria, no frequency, no hesitancy  PSYCHIATRY: no depression , no anxiety  ALL OTHER  ROS negative        Vital Signs Last 24 Hrs  T(C): 36.1 (23 Jul 2021 05:20), Max: 36.6 (22 Jul 2021 20:47)  T(F): 97 (23 Jul 2021 05:20), Max: 97.9 (22 Jul 2021 20:47)  HR: 69 (23 Jul 2021 05:20) (58 - 69)  BP: 141/84 (23 Jul 2021 05:20) (116/70 - 141/84)  BP(mean): --  RR: 18 (23 Jul 2021 05:20) (16 - 18)  SpO2: 100% (23 Jul 2021 05:20) (98% - 100%)    ________________________________________________  PHYSICAL EXAM:  GENERAL: NAD  HEENT: Normocephalic;  conjunctivae and sclerae clear; moist mucous membranes;   NECK : supple  CHEST/LUNG: Clear to auscultation bilaterally with good air entry   HEART: S1 S2  regular; no murmurs, gallops or rubs  ABDOMEN: Soft, Nontender, Nondistended; Bowel sounds present rt nephrostomy with drain   EXTREMITIES: no cyanosis; no edema; no calf tenderness  SKIN: warm and dry; no rash  NERVOUS SYSTEM:  Awake and alert; Oriented  to place, person and time ; no new deficits    _________________________________________________  LABS:                        15.0   4.09  )-----------( 225      ( 23 Jul 2021 08:04 )             45.7     07-23    139  |  105  |  34<H>  ----------------------------<  78  4.6   |  29  |  1.20    Ca    10.4      23 Jul 2021 08:04      PT/INR - ( 23 Jul 2021 08:04 )   PT: 12.6 sec;   INR: 1.06 ratio         PTT - ( 23 Jul 2021 08:04 )  PTT:29.0 sec    CAPILLARY BLOOD GLUCOSE      POCT Blood Glucose.: 73 mg/dL (23 Jul 2021 11:25)        RADIOLOGY & ADDITIONAL TESTS:  < from: CT Abdomen and Pelvis No Cont (07.19.21 @ 17:18) >  EXAM:  CT ABDOMEN AND PELVIS                            PROCEDURE DATE:  07/19/2021          INTERPRETATION:  CLINICAL INFORMATION: Left flank pain. Antecedent history of bladder cancer    COMPARISON: 8/17/2019    CONTRAST/COMPLICATIONS:  IV Contrast: NONE  Oral Contrast: NONE  Complications: None reported at time of study completion    PROCEDURE:  CT of the Abdomen and Pelvis was performed.  Sagittal and coronal reformats were performed.  Evaluation solid organ parenchyma and urinary tract significantly limited by lack of IV contrast.  FINDINGS:  LOWER CHEST: In situ cardiac pacer. Clear lung bases..    LIVER: Within normal limits.  BILE DUCTS: Normal caliber.  GALLBLADDER: Within normal limits.  SPLEEN: Within normal limits.  PANCREAS: Within normal limits.  ADRENALS: Within normal limits.  KIDNEYS/URETERS: Severe end-stage chronic left hydroureteronephrosis with marked thinning of the cortical mantle, worsened compared to prior. No obstructing calculus. Right nephrostomy catheter in situ. No hydronephrosis or urolithiasis on the right.    BLADDER: Extensive poorly defined lobulated soft tissue in the bladder consistent with the history of bladder neoplasm. Associated hematoma difficult to exclude.  REPRODUCTIVE ORGANS: No gynecologic mass    BOWEL: No bowel obstruction. Appendix no appendicitis  PERITONEUM: No ascites.  VESSELS: Nonaneurysmal.  RETROPERITONEUM/LYMPH NODES: No lymphadenopathy.  ABDOMINAL WALL: Within normal limits.  BONES: No acute or aggressive pathology.    IMPRESSION:  Significantly limited by lack of IV contrast.  Severe end-stage chronic left hydroureteronephrosis without obstructing calculus presumably related to bladder neoplasm.  Poorly defined bladder soft tissue density consistent with afforded history of bladder neoplasm.        --- End of Report ---            GERA PIKE MD; Attending Radiologist  This document has been electronically signed. Jul 19 2021  5:41PM    < end of copied text >  < from: Xray Chest 2 Views PA/Lat (07.19.21 @ 15:45) >    EXAM:  XR CHEST PA LAT 2V                            PROCEDURE DATE:  07/19/2021          INTERPRETATION:  INDICATION: Chest Pain    PRIORS: 4/17/2020.    VIEWS: Frontal and lateral radiographs of the chest performed.    FINDINGS: Heart size appears within normal limits. There is made of an indwelling pacemaker. No hilar or superior mediastinal abnormalities are identified.  Circumscribed nodularity identified overlying the left lower lung field most consistent with a nipple shadow.  There is no evidence for focal infiltrate, lobar consolidation or pulmonary edema. No pleural effusion or pneumothorax is demonstrated. No mediastinal shift is noted. The visualized osseous structures appear unremarkable.    IMPRESSION: No evidence for focal infiltrate or lobar consolidation.    --- End of Report ---            EMA MASTERS MD; Attending Radiologist  This document has been electronically signed. Jul 20 2021  6:26PM    < end of copied text >    Imaging Personally Reviewed:  YES    Consultant(s) Notes Reviewed:   YES    Care Discussed with Consultants : uro/ heme/onc / ID     Plan of care was discussed with patient and /or primary care giver; all questions and concerns were addressed and care was aligned with patient's wishes.

## 2021-07-26 NOTE — PROGRESS NOTE ADULT - ASSESSMENT
Patient is a 55y old  Female with PMHx of hypertension, bladder masses, questionable bladder cancer, and left-sided hydronephrosis presents to the ER for evaluation of left-sided chest pain and left flank pain x2 weeks. Patient states that she has been feeling left-sided aching chest pain wrapping around to her flank and down the left side of her abdomen. Patient reports that she has history of similar symptoms in the past.  Sterling was recently diagnosed with a UTI last month, and states that she was treated for pyelonephritis and completed her full course of antibiotics. ON admission, she has no fever or Leukocytosis but positive urine analysis. The CT abd/pelvis shows Severe end-stage chronic left hydroureteronephrosis without obstructing calculus presumably related to bladder neoplasm. She has started on Ceftriaxone and the ID consult requested to assist with further evaluation and antibiotic management.        #Complicated  UTI  - in the setting of Right PCN- s/p change Right PCNT 7/23/21  # Chronic left hydroureteronephrosis without obstructing calculus   # Bladder cancer    would recommend:    1. Monitor kidney function , is trending back up   2. Pain management as needed  3. Continue Ceftriaxone 1 g daily, until 7/29/21., may change to oral Abx on discharge   4. OOB to chair     d/w patient  and Covering NP    Attending Attestation:    Spent more than 35 minutes on total encounter, more than 50 % of the visit was spent counseling and/or coordinating care by the Attending physician. Patient is a 55y old  Female with PMHx of hypertension, bladder masses, questionable bladder cancer, and left-sided hydronephrosis presents to the ER for evaluation of left-sided chest pain and left flank pain x2 weeks. Patient states that she has been feeling left-sided aching chest pain wrapping around to her flank and down the left side of her abdomen. Patient reports that she has history of similar symptoms in the past.  Sterling was recently diagnosed with a UTI last month, and states that she was treated for pyelonephritis and completed her full course of antibiotics. ON admission, she has no fever or Leukocytosis but positive urine analysis. The CT abd/pelvis shows Severe end-stage chronic left hydroureteronephrosis without obstructing calculus presumably related to bladder neoplasm. She has started on Ceftriaxone and the ID consult requested to assist with further evaluation and antibiotic management.        #Complicated  UTI  - in the setting of Right PCN- s/p change Right PCNT 7/23/21  # Chronic left hydroureteronephrosis without obstructing calculus   # Bladder cancer    would recommend:    1. Monitor kidney function , creatinine is trending back up   2. Pain management as needed  3. Continue Ceftriaxone 1 g daily, until 7/29/21., may change to oral Augmentin 875 mg q 12hours on discharge   4. OOB to chair     d/w patient  and Covering NPArchana      Attending Attestation:    Spent more than 35 minutes on total encounter, more than 50 % of the visit was spent counseling and/or coordinating care by the Attending physician.

## 2021-07-26 NOTE — PROGRESS NOTE ADULT - PROBLEM SELECTOR PLAN 2
Patient has past history of bladder mass  Appreciate Urology recs -c/w Rt NT. Pt reports right NT drains poorly, flushed it couple of times overnight.   IR inspected NT today, no obstruction, flushed freely   s/p NT tube change

## 2021-07-26 NOTE — PROGRESS NOTE ADULT - ASSESSMENT
_________________________________________________________________________________________  ========>>  M E D I C A L   A T T E N D I N G    F O L L O W  U P  N O T E  <<=========  -----------------------------------------------------------------------------------------------------    - Patient seen and examined by me earlier today.    - In summary,  JEF HOUSE is a 55y year old woman admitted with Lft flank pain   - Patient today overall doing ok, comfortable     pt post nephrostomy replacement . stated not much urine in bag but making urine well otherwise >> being evaluated by IR     ==================>> REVIEW OF SYSTEM <<=================    GEN: no fever, no chills, no signif pain  reported   RESP: no SOB, no cough, no sputum  CVS: no chest pain, no palpitations, no edema  GI: no abdominal pain, no nausea  : no dysuria, no frequency, no hematuria reported    Neuro: no headache, no dizziness  Derm : no itching, no rash    ==================>> PHYSICAL EXAM <<=================    GEN: A&O X 3 , NAD , comfortable, pleasant, calm , cachectic , alert and interactive, a bit anxious    HEENT: NCAT, PERRL, MMM, hearing intact  Neck: supple , no JVD appreciated  CVS: S1S2 , regular , No M/R/G appreciated  PULM: CTA B/L,  no W/R/R appreciated  ABD.: soft. non tender, non distended  Extrem: intact pulses , no edema     nephrostomy to bag with yellow urine   PSYCH : normal mood,  a bit anxious                              ( Note written / Date of service 07-26-21 )    ==================>> MEDICATIONS <<====================    cefTRIAXone   IVPB      cefTRIAXone   IVPB 1000 milliGRAM(s) IV Intermittent every 24 hours  dextrose 5% + sodium chloride 0.9%. 1000 milliLiter(s) IV Continuous <Continuous>  polyethylene glycol 3350 17 Gram(s) Oral daily  senna 2 Tablet(s) Oral at bedtime    MEDICATIONS  (PRN):  acetaminophen   Tablet .. 650 milliGRAM(s) Oral every 4 hours PRN Mild Pain (1 - 3)  traMADol 50 milliGRAM(s) Oral every 6 hours PRN Moderate Pain (4 - 6)  traMADol 75 milliGRAM(s) Oral every 6 hours PRN Severe Pain (7 - 10)    ___________  Active diet:  Diet, Regular:   DASH/TLC Sodium & Cholesterol Restricted  ___________________    ==================>> VITAL SIGNS <<==================    Vital Signs Last 24 HrsT(C): 36.2 (07-26-21 @ 04:55)  T(F): 97.1 (07-26-21 @ 04:55), Max: 97.6 (07-25-21 @ 13:50)  HR: 53 (07-26-21 @ 04:55) (53 - 89)  BP: 130/75 (07-26-21 @ 04:55)  RR: 18 (07-26-21 @ 04:55) (16 - 18)  SpO2: 100% (07-26-21 @ 04:55) (99% - 100%)       ==================>> LAB AND IMAGING <<==================                        14.1   3.98  )-----------( 203      ( 26 Jul 2021 07:30 )             43.6        07-26    138  |  105  |  34<H>  ----------------------------<  87  3.8   |  26  |  1.24    Ca    10.2      26 Jul 2021 07:30    WBC count:   3.98 <<== ,  5.81 <<== ,  4.09 <<==   Hemoglobin:   14.1 <<==,  13.9 <<==,  15.0 <<==  platelets:  203 <==, 192 <==, 225 <==, 211 <==    Creatinine:  1.24  <<==, 0.98  <<==, 1.20  <<==, 1.29  <<==  Sodium:   138  <==, 139  <==, 139  <==, 137  <==    ___________________________________________________________________________________  ===============>>  A S S E S S M E N T   A N D   P L A N <<===============  ------------------------------------------------------------------------------------------    · Assessment	  55 year old female with PMHx of hypertension,  pacemaker for sick sinus syndrome ([place], ) bladder masses, questionable bladder cancer,  left-sided hydronephrosis and Rt PCN  presents to the ED with complaints of left-sided chest pain and left flank pain x2 weeks. Pt follows with Dr. Trinidad. Per  while patient was initially thought to have T1 disease in bladder, appearance on recent imaging may suggest T3. However, recently had PET on 7/16/21 demonstrating new FDG-avid left para-aortic LN in RP space, consistent w/ possible met    Problem/Plan - 1:  ·  Problem: complex UTI and Pyelonephritis.    - Patient presented with left flank pain. ( though left flank pain likely due to cysts and not pyelo)   - follow cultures from urine and nephrostomy  - Appreciate Urology and IR follow up   - Appreciate ID follow up  - post tube change >> monitor output     Problem/Plan - 2:  ·  Problem: Nephrostomy status.  Plan: - Patient has past history of bladder mass.   - CT abdomen showing Severe end-stage chronic left hydroureteronephrosis without obstructing calculus presumably related to bladder neoplasm. Poorly defined bladder soft tissue density consistent with afforded history of bladder neoplasm.  - Appreciate Urology recs -        left kidney no plans for any intervention as no expectation for recovery as noted and discussed   -  and other providers have discussed with pt's Uro: Dr. Trinidad >> trying to get report of PET scan for possible Bx of RP lymph node >> plan for IR to review PET and plan for possible LN Bx       CD brought into hospital and being loaded to PACS ..!     Problem/Plan - 3:  ·  Problem: Chest pain.  Plan: - Patient c/o left chest pain with left flank pain   - EKG showing sinus bradycardia  - Dr Rosario consulted- pt had a recent echo and stress test in Dr Rosario office was negative  - CP not likely cardiac as above  - no further cardiac work up needed.     Problem/Plan - 4:  ·  Problem: Bladder mass.    as above  - [facility] group on board   awaiting PET..     Problem/Plan - 5:  ·  Problem: HTN   - Patient has past  hsitory of HTN controlled on diet.    Problem/Plan - 6:  ·  Problem: Asthma.    - Patient has past history of asthma not on any meds and well controlled     GI/ DVT prophylaxis   --------------------------------------------  Case discussed with pt, NP  Education given on findings and plan of care  ___________________________  H. NIEVES Ochoa.  Pager: [phone]

## 2021-07-26 NOTE — PROGRESS NOTE ADULT - ASSESSMENT
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 These symptoms are chronic per patient. Seen to have gross left HUN w/ marked cortical thinning w/ rt PCN in place with no hydro. Patient is nonseptic and UA not suggestive of definitive UTI. Now s/p rt PCN exchange w/ IR 7/24.     --C/w Rt PCN  --Outside PET films currently being uploaded to PACS. Plan for review of images by IR. If IR amenable to biopsy of LND after image review, will plan for LND biopsy prior to d/c  --Pt should follow up with urology (Dr. Trinidad) and onc (Hayley) at d/c.

## 2021-07-27 ENCOUNTER — TRANSCRIPTION ENCOUNTER (OUTPATIENT)
Age: 56
End: 2021-07-27

## 2021-07-27 VITALS
OXYGEN SATURATION: 99 % | HEART RATE: 91 BPM | DIASTOLIC BLOOD PRESSURE: 74 MMHG | SYSTOLIC BLOOD PRESSURE: 136 MMHG | RESPIRATION RATE: 18 BRPM | TEMPERATURE: 98 F

## 2021-07-27 LAB
ANION GAP SERPL CALC-SCNC: 6 MMOL/L — SIGNIFICANT CHANGE UP (ref 5–17)
BUN SERPL-MCNC: 29 MG/DL — HIGH (ref 7–18)
CALCIUM SERPL-MCNC: 10.6 MG/DL — HIGH (ref 8.4–10.5)
CHLORIDE SERPL-SCNC: 105 MMOL/L — SIGNIFICANT CHANGE UP (ref 96–108)
CO2 SERPL-SCNC: 30 MMOL/L — SIGNIFICANT CHANGE UP (ref 22–31)
CREAT SERPL-MCNC: 1.02 MG/DL — SIGNIFICANT CHANGE UP (ref 0.5–1.3)
GLUCOSE SERPL-MCNC: 85 MG/DL — SIGNIFICANT CHANGE UP (ref 70–99)
HCT VFR BLD CALC: 43.5 % — SIGNIFICANT CHANGE UP (ref 34.5–45)
HGB BLD-MCNC: 14.3 G/DL — SIGNIFICANT CHANGE UP (ref 11.5–15.5)
MCHC RBC-ENTMCNC: 30.2 PG — SIGNIFICANT CHANGE UP (ref 27–34)
MCHC RBC-ENTMCNC: 32.9 GM/DL — SIGNIFICANT CHANGE UP (ref 32–36)
MCV RBC AUTO: 91.8 FL — SIGNIFICANT CHANGE UP (ref 80–100)
NRBC # BLD: 0 /100 WBCS — SIGNIFICANT CHANGE UP (ref 0–0)
PLATELET # BLD AUTO: 219 K/UL — SIGNIFICANT CHANGE UP (ref 150–400)
POTASSIUM SERPL-MCNC: 4.2 MMOL/L — SIGNIFICANT CHANGE UP (ref 3.5–5.3)
POTASSIUM SERPL-SCNC: 4.2 MMOL/L — SIGNIFICANT CHANGE UP (ref 3.5–5.3)
RBC # BLD: 4.74 M/UL — SIGNIFICANT CHANGE UP (ref 3.8–5.2)
RBC # FLD: 12.7 % — SIGNIFICANT CHANGE UP (ref 10.3–14.5)
SODIUM SERPL-SCNC: 141 MMOL/L — SIGNIFICANT CHANGE UP (ref 135–145)
WBC # BLD: 3.86 K/UL — SIGNIFICANT CHANGE UP (ref 3.8–10.5)
WBC # FLD AUTO: 3.86 K/UL — SIGNIFICANT CHANGE UP (ref 3.8–10.5)

## 2021-07-27 PROCEDURE — 99285 EMERGENCY DEPT VISIT HI MDM: CPT

## 2021-07-27 PROCEDURE — 93005 ELECTROCARDIOGRAM TRACING: CPT

## 2021-07-27 PROCEDURE — 87641 MR-STAPH DNA AMP PROBE: CPT

## 2021-07-27 PROCEDURE — 87635 SARS-COV-2 COVID-19 AMP PRB: CPT

## 2021-07-27 PROCEDURE — 74176 CT ABD & PELVIS W/O CONTRAST: CPT | Mod: MA

## 2021-07-27 PROCEDURE — 71046 X-RAY EXAM CHEST 2 VIEWS: CPT

## 2021-07-27 PROCEDURE — 36415 COLL VENOUS BLD VENIPUNCTURE: CPT

## 2021-07-27 PROCEDURE — 80053 COMPREHEN METABOLIC PANEL: CPT

## 2021-07-27 PROCEDURE — 85027 COMPLETE CBC AUTOMATED: CPT

## 2021-07-27 PROCEDURE — 86769 SARS-COV-2 COVID-19 ANTIBODY: CPT

## 2021-07-27 PROCEDURE — 81001 URINALYSIS AUTO W/SCOPE: CPT

## 2021-07-27 PROCEDURE — 84100 ASSAY OF PHOSPHORUS: CPT

## 2021-07-27 PROCEDURE — 87640 STAPH A DNA AMP PROBE: CPT

## 2021-07-27 PROCEDURE — 85610 PROTHROMBIN TIME: CPT

## 2021-07-27 PROCEDURE — 82962 GLUCOSE BLOOD TEST: CPT

## 2021-07-27 PROCEDURE — C1729: CPT

## 2021-07-27 PROCEDURE — 87086 URINE CULTURE/COLONY COUNT: CPT

## 2021-07-27 PROCEDURE — 83735 ASSAY OF MAGNESIUM: CPT

## 2021-07-27 PROCEDURE — 82607 VITAMIN B-12: CPT

## 2021-07-27 PROCEDURE — 50432 PLMT NEPHROSTOMY CATHETER: CPT

## 2021-07-27 PROCEDURE — 80048 BASIC METABOLIC PNL TOTAL CA: CPT

## 2021-07-27 PROCEDURE — 80061 LIPID PANEL: CPT

## 2021-07-27 PROCEDURE — 84484 ASSAY OF TROPONIN QUANT: CPT

## 2021-07-27 PROCEDURE — 76000 FLUOROSCOPY <1 HR PHYS/QHP: CPT

## 2021-07-27 PROCEDURE — 85379 FIBRIN DEGRADATION QUANT: CPT

## 2021-07-27 PROCEDURE — 83036 HEMOGLOBIN GLYCOSYLATED A1C: CPT

## 2021-07-27 PROCEDURE — 84443 ASSAY THYROID STIM HORMONE: CPT

## 2021-07-27 PROCEDURE — 85730 THROMBOPLASTIN TIME PARTIAL: CPT

## 2021-07-27 PROCEDURE — 85025 COMPLETE CBC W/AUTO DIFF WBC: CPT

## 2021-07-27 PROCEDURE — C1769: CPT

## 2021-07-27 PROCEDURE — 50389 REMOVE RENAL TUBE W/FLUORO: CPT

## 2021-07-27 RX ORDER — POLYETHYLENE GLYCOL 3350 17 G/17G
17 POWDER, FOR SOLUTION ORAL
Qty: 510 | Refills: 0
Start: 2021-07-27 | End: 2021-08-25

## 2021-07-27 RX ORDER — ACETAMINOPHEN 500 MG
2 TABLET ORAL
Qty: 56 | Refills: 0
Start: 2021-07-27 | End: 2021-08-02

## 2021-07-27 RX ORDER — SENNA PLUS 8.6 MG/1
2 TABLET ORAL
Qty: 60 | Refills: 0
Start: 2021-07-27 | End: 2021-08-25

## 2021-07-27 NOTE — PROGRESS NOTE ADULT - ASSESSMENT
_________________________________________________________________________________________  ========>>  M E D I C A L   A T T E N D I N G    F O L L O W  U P  N O T E  <<=========  -----------------------------------------------------------------------------------------------------    - Patient seen and examined by me earlier today.    - In summary,  JEF HOUSE is a 55y year old woman admitted with Lft flank pain   - Patient today overall doing ok, comfortable     pt already had discussions with IR, Oncology, others, and family :  at this time not amendable to biopsy of lymph node and wants to have biopsy of bladder ( which is not recommended by ) and just to get treatment for the lymph node with chemo as was working before..          pt to go home and follow up with her own urologist and oncologist as op for further mgmt    ==================>> REVIEW OF SYSTEM <<=================    GEN: no fever, no chills, no pain  reported   RESP: no SOB, no cough, no sputum  CVS: no chest pain, no palpitations, no edema  GI: no abdominal pain, no nausea  : no dysuria, no frequency, no hematuria reported    Neuro: no headache, no dizziness  Derm : no itching, no rash    ==================>> PHYSICAL EXAM <<=================    GEN: A&O X 3 , NAD , comfortable, pleasant, calm , cachectic   HEENT: NCAT, PERRL, MMM, hearing intact  Neck: supple , no JVD appreciated  CVS: S1S2 , regular , No M/R/G appreciated  PULM: CTA B/L,  no W/R/R appreciated  ABD.: soft. non tender, non distended  Extrem: intact pulses , no edema     nephrostomy to bag with yellow urine   PSYCH : normal mood,  a bit anxious                                ( Note written / Date of service 07-27-21 )    ==================>> MEDICATIONS <<====================    cefTRIAXone   IVPB      cefTRIAXone   IVPB 1000 milliGRAM(s) IV Intermittent every 24 hours  dextrose 5% + sodium chloride 0.9%. 1000 milliLiter(s) IV Continuous <Continuous>  enoxaparin Injectable 40 milliGRAM(s) SubCutaneous daily  polyethylene glycol 3350 17 Gram(s) Oral daily  senna 2 Tablet(s) Oral at bedtime    MEDICATIONS  (PRN):  acetaminophen   Tablet .. 650 milliGRAM(s) Oral every 4 hours PRN Mild Pain (1 - 3)  traMADol 50 milliGRAM(s) Oral every 6 hours PRN Moderate Pain (4 - 6)  traMADol 75 milliGRAM(s) Oral every 6 hours PRN Severe Pain (7 - 10)    ___________  Active diet:  Diet, Regular:   DASH/TLC Sodium & Cholesterol Restricted  ___________________    ==================>> VITAL SIGNS <<==================    Vital Signs Last 24 HrsT(C): 36.8 (07-27-21 @ 11:59)  T(F): 98.3 (07-27-21 @ 11:59), Max: 98.3 (07-27-21 @ 11:59)  HR: 91 (07-27-21 @ 11:59) (60 - 91)  BP: 136/74 (07-27-21 @ 11:59)  RR: 18 (07-27-21 @ 11:59) (16 - 18)  SpO2: 99% (07-27-21 @ 11:59) (99% - 100%)         ==================>> LAB AND IMAGING <<==================                        14.3   3.86  )-----------( 219      ( 27 Jul 2021 07:12 )             43.5        07-27    141  |  105  |  29<H>  ----------------------------<  85  4.2   |  30  |  1.02    Ca    10.6<H>      27 Jul 2021 07:12      WBC count:   3.86 <<== ,  3.98 <<== ,  5.81 <<== ,  4.09 <<==   Hemoglobin:   14.3 <<==,  14.1 <<==,  13.9 <<==,  15.0 <<==  platelets:  219 <==, 203 <==, 192 <==, 225 <==    Creatinine:  1.02  <<==, 1.24  <<==, 0.98  <<==, 1.20  <<==  Sodium:   141  <==, 138  <==, 139  <==, 139  <==      ___________________________________________________________________________________  ===============>>  A S S E S S M E N T   A N D   P L A N <<===============  ------------------------------------------------------------------------------------------    · Assessment	  55 year old female with PMHx of hypertension,  pacemaker for sick sinus syndrome ([place], ) bladder masses, questionable bladder cancer,  left-sided hydronephrosis and Rt PCN  presents to the ED with complaints of left-sided chest pain and left flank pain x2 weeks. Pt follows with Dr. Trinidad. Per  while patient was initially thought to have T1 disease in bladder, appearance on recent imaging may suggest T3. However, recently had PET on 7/16/21 demonstrating new FDG-avid left para-aortic LN in RP space, consistent w/ possible met    Problem/Plan - 1:  ·  Problem: complex UTI and Pyelonephritis.    - Patient presented with left flank pain. ( though left flank pain likely due to cysts and not pyelo)   - follow cultures from urine and nephrostomy  - Appreciate Urology and IR follow up   - Appreciate ID follow up  - post tube change >> monitor output     Problem/Plan - 2:  ·  Problem: Nephrostomy status.  Plan: - Patient has past history of bladder mass.   - CT abdomen showing Severe end-stage chronic left hydroureteronephrosis without obstructing calculus presumably related to bladder neoplasm. Poorly defined bladder soft tissue density consistent with afforded history of bladder neoplasm.  - Appreciate Urology f/u      as above     Problem/Plan - 3:  ·  Problem: Chest pain.  Plan: - Patient c/o left chest pain with left flank pain   - EKG showing sinus bradycardia  - Dr Rosario consulted- pt had a recent echo and stress test in Dr Rosario office was negative  - CP not likely cardiac as above  - no further cardiac work up needed.     Problem/Plan - 4:  ·  Problem: Bladder mass.    as above  - [facility] group on board   awaiting PET..     Problem/Plan - 5:  ·  Problem: HTN   - Patient has past  hsitory of HTN controlled on diet.    Problem/Plan - 6:  ·  Problem: Asthma.    - Patient has past history of asthma not on any meds and well controlled     GI/ DVT prophylaxis     as above  DC home    finish abx as per ID    / onc follo wup     --------------------------------------------  Case discussed with pt, NP  Education given on findings and plan of care  ___________________________  H. NIEVES Ochoa.  Pager: [phone]

## 2021-07-27 NOTE — DISCHARGE NOTE NURSING/CASE MANAGEMENT/SOCIAL WORK - PATIENT PORTAL LINK FT
You can access the FollowMyHealth Patient Portal offered by [facility] by registering at the following website: [email or web address]. By joining [employer]’s FollowMyHealth portal, you will also be able to view your health information using other applications (apps) compatible with our system.

## 2021-07-27 NOTE — PROGRESS NOTE ADULT - NSICDXPILOT_GEN_ALL_CORE
[place]

## 2021-07-27 NOTE — PROGRESS NOTE ADULT - ASSESSMENT
complete note to follow   	  Assessment and Plan:   · Assessment	    Pt is a 55 year old female with PMHx of hypertension,  pacemaker for sick sinus syndrome ([facility]) bladder masses, questionable bladder cancer,  left-sided hydronephrosis and Rt PCN  presents to the ED with complaints of left-sided chest pain and left flank pain x2 weeks. Patient states that she has been feeling left-sided aching chest pain wrapping around to her flank and down the left side of her abdomen.   Patient reports that she has history of similar symptoms in the past.  Patient states that she was recently diagnosed with a UTI last month, and states that she was treated for pyelonephritis and completed her full course of antibiotics. She also endorses that her PCN tube isnot draining very well but it drains better when flushing it.      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 Trinidad [phone]  CT shows end-stage Left hydro, Right NT no hydro and extensive poorly defined lobulated bladder mass  Rec's:  -ALEJANDRINA Camp spoke with Dr. Trinidad and the pt originally followed with [facility] and then [facility] and then with her, the pt originally presented as a T1, but likely understaged as she p/w left hydro at that time. Moleculart studies were NOT done. She had a Left Retroperitoneal node Bx'd in 10/2020 which was (-) for malignancy. She was treated with neoadjuvant chemo with gem/cis x 2 cycles and then developed pyelo and did not restart chemo and lost to follow-up. She had a repeat PET 7/16/21 which showed a para-aortic LN, it is recommended that if it is accessible for biopsy to have it done to prove if met or not. If Bx is negative will need cystoscopy with biopsy to send out for molecular studies  -On abx for pyelonephritis, ID following  -Appreciate Urology consult, recommended NT exchange  -s/p Right NT exchange  -Appreciate IR input, unable to biopsy para-aortic node, d/c and f/u as outpt  -pt has not f/u with [facility] and would like to f/u in our office for Medical Oncology  -can f/u with Dr. Garcia / [facility] upon discharge    Thank you for the referral. Will continue to monitor the patient.  Please call with any questions [phone]  Above reviewed with Attending Dr. Colindres

## 2021-07-27 NOTE — PROGRESS NOTE ADULT - PROVIDER SPECIALTY LIST ADULT
Cardiology
Heme/Onc
Internal Medicine
Urology
Cardiology
Heme/Onc
Infectious Disease
Internal Medicine
Intervent Radiology
Urology
Urology
Cardiology
Infectious Disease
Infectious Disease
Internal Medicine
Internal Medicine
Urology
Cardiology
Infectious Disease
Infectious Disease
Intervent Radiology
Internal Medicine

## 2021-07-27 NOTE — PROGRESS NOTE ADULT - ATTENDING COMMENTS
I reviewed the above and edited where appropriate.   Chart and relevant imaging reviewed.   I have seen the patient at bedside.  Right nephrostomy intact, draining clear yellow urine. Given decreased output, as per patient, offered to check/exchange the catheter today but patient declined stating that she wants it done with sedation.   Currently, PET-CT not available for review. Of note, patient declined the para-aortic LN biopsy, stating "I had that done before."     Please call IR if clinical situation changes and/or new information becomes available.
I have examined the patient at bedside and reviewed patient's data and participated in the management of the patient along with Zoë TINOCO as well as hemotology/med oncology faculty consisting of Dr. Jason Lorenzo, Dr. MORGAN Back, Dr. TRINIDAD Garcia, Dr. Duyen Osborne, Dr. Vicki Mohamud, Dr. Jose Steiner as well as myself during the daily heme/onc case review. I reviewed pertinent clinical information, PE,  labs as well as A/P as outline above.     Appreciate IR input. Unable to perform IR guided para aortic LN biopsy. To f/u as outpatient with Dr. Trinidad and f/u with Dr. Garcia.
I have examined the patient at bedside and reviewed patient's data and participated in the management of the patient along with Zoë TINOCO as well as hemotology/med oncology faculty consisting of Dr. Jason Lorenzo, Dr. MORGAN Back, Dr. TRINIDAD Garcia, Dr. Duyen Osborne, Dr. Vicki Mohamud, Dr. Jose Steiner as well as myself during the daily heme/onc case review. I reviewed pertinent clinical information, PE,  labs as well as A/P as outline above.     IR to review uploaded CT/PET to see if biopsy of the Para-aortic LN.
# Bladder cancer:   Patient mentions she had not qualified for Immunotherapy prior. Hence got 3 doses of [place] / Cis at [facility].   Awaiting records from [facility]. Further recs pending. Will discuss with primary Urologist.   May need repeat biopsy. We will follow
# Bladder cancer:   Records reviewed from [facility] and as noted above.   Case d/w primary Urologist. Urology note reviewed.   Plan for para-aortic LN biopsy, though patient prefers to have repeat bladder biopsy.   Out-pt f/u after discharge. Further recs pending above.   I will be off service. Dr. Colindres covering for next week.

## 2021-07-27 NOTE — PROGRESS NOTE ADULT - SUBJECTIVE AND OBJECTIVE BOX
Patient is a 55y old  Female who presents with a chief complaint of Lt flank pain and chest pain (26 Jul 2021 16:14)      SUBJECTIVE / OVERNIGHT EVENTS:    ADDITIONAL REVIEW OF SYSTEMS:    MEDICATIONS  (STANDING):  cefTRIAXone   IVPB      cefTRIAXone   IVPB 1000 milliGRAM(s) IV Intermittent every 24 hours  dextrose 5% + sodium chloride 0.9%. 1000 milliLiter(s) (60 mL/Hr) IV Continuous <Continuous>  enoxaparin Injectable 40 milliGRAM(s) SubCutaneous daily  polyethylene glycol 3350 17 Gram(s) Oral daily  senna 2 Tablet(s) Oral at bedtime    MEDICATIONS  (PRN):  acetaminophen   Tablet .. 650 milliGRAM(s) Oral every 4 hours PRN Mild Pain (1 - 3)  traMADol 50 milliGRAM(s) Oral every 6 hours PRN Moderate Pain (4 - 6)  traMADol 75 milliGRAM(s) Oral every 6 hours PRN Severe Pain (7 - 10)      Vital Signs Last 24 Hrs  T(C): 36.8 (27 Jul 2021 11:59), Max: 36.8 (27 Jul 2021 11:59)  T(F): 98.3 (27 Jul 2021 11:59), Max: 98.3 (27 Jul 2021 11:59)  HR: 91 (27 Jul 2021 11:59) (60 - 91)  BP: 136/74 (27 Jul 2021 11:59) (114/60 - 136/74)  BP(mean): --  RR: 18 (27 Jul 2021 11:59) (16 - 18)  SpO2: 99% (27 Jul 2021 11:59) (99% - 100%)      LABS:                        14.3   3.86  )-----------( 219      ( 27 Jul 2021 07:12 )             43.5     07-27    141  |  105  |  29<H>  ----------------------------<  85  4.2   |  30  |  1.02    Ca    10.6<H>      27 Jul 2021 07:12        COVID-19 PCR: NotDetec (22 Jul 2021 12:25)  COVID-19 PCR: NotDetec (19 Jul 2021 19:33)         Patient is a 55y old  Female who presents with a chief complaint of Lt flank pain and chest pain (26 Jul 2021 16:14)    SUBJECTIVE / OVERNIGHT EVENTS: events noted. IR cannot perform LN biopsy d/t location. Pt to be discharged today    MEDICATIONS  (STANDING):  cefTRIAXone   IVPB      cefTRIAXone   IVPB 1000 milliGRAM(s) IV Intermittent every 24 hours  dextrose 5% + sodium chloride 0.9%. 1000 milliLiter(s) (60 mL/Hr) IV Continuous <Continuous>  enoxaparin Injectable 40 milliGRAM(s) SubCutaneous daily  polyethylene glycol 3350 17 Gram(s) Oral daily  senna 2 Tablet(s) Oral at bedtime    MEDICATIONS  (PRN):  acetaminophen   Tablet .. 650 milliGRAM(s) Oral every 4 hours PRN Mild Pain (1 - 3)  traMADol 50 milliGRAM(s) Oral every 6 hours PRN Moderate Pain (4 - 6)  traMADol 75 milliGRAM(s) Oral every 6 hours PRN Severe Pain (7 - 10)      Vital Signs Last 24 Hrs  T(C): 36.8 (27 Jul 2021 11:59), Max: 36.8 (27 Jul 2021 11:59)  T(F): 98.3 (27 Jul 2021 11:59), Max: 98.3 (27 Jul 2021 11:59)  HR: 91 (27 Jul 2021 11:59) (60 - 91)  BP: 136/74 (27 Jul 2021 11:59) (114/60 - 136/74)  BP(mean): --  RR: 18 (27 Jul 2021 11:59) (16 - 18)  SpO2: 99% (27 Jul 2021 11:59) (99% - 100%)    GEN: NAD; A and O x 3  LUNGS: CTA B/L  HEART: S1 S2, left CW pacemaker  ABDOMEN: soft, non-tender, non-distended, + BS, right NT  EXTREMITIES: no edema  NERVOUS SYSTEM:  Awake and alert; no focal neuro deficits    LABS:                        14.3   3.86  )-----------( 219      ( 27 Jul 2021 07:12 )             43.5     07-27    141  |  105  |  29<H>  ----------------------------<  85  4.2   |  30  |  1.02    Ca    10.6<H>      27 Jul 2021 07:12        COVID-19 PCR: NotDetec (22 Jul 2021 12:25)  COVID-19 PCR: NotDetec (19 Jul 2021 19:33)

## 2021-07-29 RX ORDER — AMOXICILLIN 250 MG/5ML
1 SUSPENSION, RECONSTITUTED, ORAL (ML) ORAL
Qty: 6 | Refills: 0
Start: 2021-07-29 | End: 2021-07-30

## 2021-07-29 RX ORDER — AMOXICILLIN 250 MG/5ML
1 SUSPENSION, RECONSTITUTED, ORAL (ML) ORAL
Qty: 4 | Refills: 0
Start: 2021-07-29 | End: 2021-07-30

## 2021-08-04 ENCOUNTER — NON-APPOINTMENT (OUTPATIENT)
Age: 56
End: 2021-08-04

## 2021-08-05 ENCOUNTER — TRANSCRIPTION ENCOUNTER (OUTPATIENT)
Age: 56
End: 2021-08-05

## 2021-08-05 ENCOUNTER — OUTPATIENT (OUTPATIENT)
Dept: OUTPATIENT SERVICES | Facility: HOSPITAL | Age: 56
LOS: 1 days | End: 2021-08-05
Payer: MEDICARE

## 2021-08-05 ENCOUNTER — EMERGENCY (EMERGENCY)
Facility: HOSPITAL | Age: 56
LOS: 1 days | Discharge: HOME | End: 2021-08-05
Admitting: EMERGENCY MEDICINE
Payer: MEDICARE

## 2021-08-05 DIAGNOSIS — Z98.89 OTHER SPECIFIED POSTPROCEDURAL STATES: Chronic | ICD-10-CM

## 2021-08-05 PROCEDURE — 76080 X-RAY EXAM OF FISTULA: CPT

## 2021-08-05 PROCEDURE — 83605 ASSAY OF LACTIC ACID: CPT

## 2021-08-05 PROCEDURE — 87635 SARS-COV-2 COVID-19 AMP PRB: CPT

## 2021-08-05 PROCEDURE — 99285 EMERGENCY DEPT VISIT HI MDM: CPT

## 2021-08-05 PROCEDURE — 80053 COMPREHEN METABOLIC PANEL: CPT

## 2021-08-05 PROCEDURE — 81025 URINE PREGNANCY TEST: CPT

## 2021-08-05 PROCEDURE — 85025 COMPLETE CBC W/AUTO DIFF WBC: CPT

## 2021-08-05 PROCEDURE — 84702 CHORIONIC GONADOTROPIN TEST: CPT

## 2021-08-05 PROCEDURE — 87086 URINE CULTURE/COLONY COUNT: CPT

## 2021-08-05 PROCEDURE — 81001 URINALYSIS AUTO W/SCOPE: CPT

## 2021-08-05 PROCEDURE — 83690 ASSAY OF LIPASE: CPT

## 2021-08-05 PROCEDURE — 99285 EMERGENCY DEPT VISIT HI MDM: CPT | Mod: 25

## 2021-08-05 PROCEDURE — 36415 COLL VENOUS BLD VENIPUNCTURE: CPT

## 2021-08-06 ENCOUNTER — NON-APPOINTMENT (OUTPATIENT)
Age: 56
End: 2021-08-06

## 2021-08-07 ENCOUNTER — EMERGENCY (EMERGENCY)
Facility: HOSPITAL | Age: 56
LOS: 1 days | Discharge: HOME | End: 2021-08-07
Attending: STUDENT IN AN ORGANIZED HEALTH CARE EDUCATION/TRAINING PROGRAM | Admitting: EMERGENCY MEDICINE
Payer: MEDICARE

## 2021-08-07 VITALS
SYSTOLIC BLOOD PRESSURE: 148 MMHG | RESPIRATION RATE: 18 BRPM | TEMPERATURE: 98 F | HEART RATE: 60 BPM | DIASTOLIC BLOOD PRESSURE: 82 MMHG | OXYGEN SATURATION: 100 %

## 2021-08-07 VITALS
HEART RATE: 57 BPM | SYSTOLIC BLOOD PRESSURE: 152 MMHG | TEMPERATURE: 98 F | RESPIRATION RATE: 17 BRPM | HEIGHT: 65 IN | OXYGEN SATURATION: 100 % | DIASTOLIC BLOOD PRESSURE: 97 MMHG

## 2021-08-07 DIAGNOSIS — Z98.89 OTHER SPECIFIED POSTPROCEDURAL STATES: Chronic | ICD-10-CM

## 2021-08-07 PROCEDURE — 74018 RADEX ABDOMEN 1 VIEW: CPT | Mod: 26

## 2021-08-07 PROCEDURE — 99283 EMERGENCY DEPT VISIT LOW MDM: CPT | Mod: GC

## 2021-08-07 NOTE — ED PROVIDER NOTE - PROGRESS NOTE DETAILS
MD CHO:  I received s/o on this pt from Dr. Maddox.  Sign out plan:  pending IR attg recs.  I paged IR fellow Brandon at 3:32pm, told he would speak to his attending in eight minutes.  At 4:14pm; pt states she cannot wait any longer and wishes to be discharged.  I paged the IR fellow back at that time; he states that he spoke with his attending and she is clear for discharge.

## 2021-08-07 NOTE — ED PROVIDER NOTE - PATIENT PORTAL LINK FT
You can access the FollowMyHealth Patient Portal offered by [facility] by registering at the following website: [email or web address]. By joining [facility]’s FollowMyHealth portal, you will also be able to view your health information using other applications (apps) compatible with our system.

## 2021-08-07 NOTE — ED PROVIDER NOTE - NSFOLLOWUPINSTRUCTIONS_ED_ALL_ED_FT
Please follow up with your primary care provider within 1 week of today. If this issue with your nephrostomy tube occurs again please return to the emergency department.

## 2021-08-07 NOTE — ED PROVIDER NOTE - OBJECTIVE STATEMENT
54yo F PMHx of asthma, bladder ca, CAD s/p stent placement presenting for  clogged nephrostomy tube. Pt states that her nephrostomy tube had been clogged recently during which she went to [facility] to have her urologist Dr. Lopez unclog it. After her recent visit with Dr. Lopez she states that little fluid has been passing through the tube since. Attempt to flush the tube at bedside was unsuccessful

## 2021-08-07 NOTE — ED PROVIDER NOTE - CLINICAL SUMMARY MEDICAL DECISION MAKING FREE TEXT BOX
56yo F presenting for clogged nephrostomy tube. IR resident saw patient bedside and had removed the blockage.

## 2021-08-07 NOTE — ED ADULT NURSE NOTE - OBJECTIVE STATEMENT
Patient brought to room 15 with c/o right sided nephrostomy tube dislodgement. Patient evaluated by MD.Patient is waiting for urology to evaluate.    BRENDEN Grewal

## 2021-08-07 NOTE — CONSULT NOTE ADULT - SUBJECTIVE AND OBJECTIVE BOX
Vascular & Interventional Radiology Brief Consult Note    HPI: 55y Female with history of bladder mass, right nephrostomy tube placement for recurrent infection presents with decreased output from right nephrostomy tube. Patient reports recent evaluation of nephrostomy on 8/5 for the same reason. She reports resistance on routine flush. She still urinates normally.     Allergies:   Medications (Abx/Cardiac/Anticoagulation/Blood Products)      Data:  165.1  T(C): 36.4  HR: 57  BP: 152/97  RR: 17  SpO2: 100%    -WBC 4.22 / HgB 13.7 / Hct 40.7 / Plt 234  -Na 140 / Cl 108 / BUN 37 / Glucose 84  -K 3.9 / CO2 29 / Cr 1.29  -ALT 18 / Alk Phos 91 / T.Bili 0.4    Imaging: Abd radiograph of 8/7 shows nephrostomy tube in unchanged position when compared to fluoroscopic study of 8/5.   Prior fluoroscopic study also demonstrates patency of the right collecting system with contrast administered via nephrostomy tube entering urinary bladder without issue.     Patient seen at bedside. Nephrostomy tube connected to leg bag. Dressing c/d/i. No leakage from around nephrostomy tube site which is nontender. Initial resistance on flushing using 10cc of normal saline. Catheter aggressively flushed with improvement of resistance and output from nephrostomy tube. Expected lower output given no urinary obstruction. Patient with needed to urinate after multiple flushes with normal saline.      Vascular & Interventional Radiology Brief Consult Note    HPI: 55y Female with history of bladder mass, right nephrostomy tube placement for recurrent infection presents with decreased output from right nephrostomy tube. Patient reports recent evaluation of nephrostomy on 8/5 for the same reason. She reports resistance on routine flush. She still urinates normally.     Allergies:   Medications (Abx/Cardiac/Anticoagulation/Blood Products)      Data:  165.1  T(C): 36.4  HR: 57  BP: 152/97  RR: 17  SpO2: 100%    -WBC 4.22 / HgB 13.7 / Hct 40.7 / Plt 234  -Na 140 / Cl 108 / BUN 37 / Glucose 84  -K 3.9 / CO2 29 / Cr 1.29  -ALT 18 / Alk Phos 91 / T.Bili 0.4    Imaging: Abd radiograph of 8/7 shows nephrostomy tube in unchanged position when compared to fluoroscopic study of 8/5.   Prior fluoroscopic study also demonstrates patency of the right collecting system with contrast administered via nephrostomy tube entering urinary bladder without issue.     Patient seen at bedside. Nephrostomy tube connected to leg bag. Dressing c/d/i. No leakage from around nephrostomy tube site which is nontender. Initial resistance on flushing using 10cc of normal saline. Catheter aggressively flushed with decreased resistance and output from nephrostomy tube. Expected lower output given no urinary obstruction. Patient with needed to urinate after multiple flushes with normal saline.

## 2021-08-07 NOTE — CONSULT NOTE ADULT - ASSESSMENT
Assessment/Plan:   55y Female with chronic right nephrostomy tube presents with decreased output. Patient continues to urinate normally. Right nephrostomy tube flushed aggressively with improved resistance. No evidence of obstruction or dislodged catheter.      Assessment/Plan:   55y Female with chronic right nephrostomy tube presents with decreased output. Patient continues to urinate normally. Right nephrostomy tube flushed aggressively with improved resistance. No evidence of obstruction or dislodged catheter.   - catheter functional upon bedside evaluation  - no role for further intervention at this time  - patient to follow up with outpatient doctors regarding further management/possible upsizing.   - case reviewed with Dr. Andujar

## 2021-08-11 ENCOUNTER — APPOINTMENT (OUTPATIENT)
Dept: UROLOGY | Facility: CLINIC | Age: 56
End: 2021-08-11
Payer: MEDICARE

## 2021-08-11 VITALS
TEMPERATURE: 97.7 F | DIASTOLIC BLOOD PRESSURE: 78 MMHG | WEIGHT: 96 LBS | BODY MASS INDEX: 15.99 KG/M2 | HEART RATE: 65 BPM | SYSTOLIC BLOOD PRESSURE: 131 MMHG | HEIGHT: 65 IN | RESPIRATION RATE: 16 BRPM

## 2021-08-11 PROCEDURE — 99213 OFFICE O/P EST LOW 20 MIN: CPT

## 2021-08-11 NOTE — ASSESSMENT
[FreeTextEntry1] : Initially with organ confined bladder cancer but concern for high risk (T3) with L hydro and atrophy. PET avid new node. Unclear if ammenable to bx. \par --Discuss with rads for possible biopsy. \par --Plan for TURBT for pathology

## 2021-08-11 NOTE — HISTORY OF PRESENT ILLNESS
[FreeTextEntry1] : 53yo female with cc of malfunctioning PCN and TCC. Pt was seen by Dr Davidson back in 8/2019. At that time she was noted to have mulriple comorbidities. She had hematuria and underwent eval with CT revealing a probable UP transitional cell ca based on L URS and abnormal cytology that localized to L but bx was non-contributory. Plan was made for probable L nephroU. Seems pt was lost to follow-up. She reports she did not have uppertract disease and only had blockage managed with stent. Unclear but seems she developed metastatic disease and underwent chemo and reports she only did 3 cycles of [place]/Cis instead of 12. Only records that pt brought for review was the non diagnostic path from 2019 and a recent PET CT. Also has a bladder bx report from 5/2020 that showed HG Ta TCC with focal squamous features. Her most recent PET shows a large bladder mass  and unclear uppertract picture. She was referred to Dr Alcocer but cancelled the appointment. She reports PCN was placed in R kidney during an infection. Has been to dependent drainage. Left side has not been instrumented that I can tell, unclear drainage/function. Plan made to obtain outside records and follow-up. Back in May she had chest pain and went to [facility]. She was dx with pericardial effusion. Has had follow-up echo per report. WHile in ER, dislodged her PCN and had it replaced (5/28). \par \par Review of records showed HG Ta TCC as above and bladder with HG T1. Scans showed nodularity on L concerning for extension outside bladder. She had PET with PET avid LAD but Had subsequent bx that was negative. Plan made for neoadjuvant chemo (GC) followed by L nephro-U and cystectomy. She had 2 cycles and was admitted to hospital in Jan for CRYSTAL and R flank pain. Had new R hydro. CRYSTAL improved and now at recent ER visit was back to 1.0. She reports PET in April at [facility] but no records. Plan made for repeat PET given time interval. This showed a new L para-aortic LN suspicious for mets. \par \par She was recently admitted to  for PCN obstruction. She stayed for tx of infection.

## 2021-08-11 NOTE — PHYSICAL EXAM
[General Appearance - In No Acute Distress] : no acute distress [Abdomen Soft] : soft [Exaggerated Use Of Accessory Muscles For Inspiration] : no accessory muscle use [Oriented To Time, Place, And Person] : oriented to person, place, and time [Normal Station and Gait] : the gait and station were normal for the patient's age [FreeTextEntry1] : R PCN in place, no urine in leg bag.

## 2021-08-13 ENCOUNTER — OUTPATIENT (OUTPATIENT)
Dept: OUTPATIENT SERVICES | Facility: HOSPITAL | Age: 56
LOS: 1 days | End: 2021-08-13

## 2021-08-13 VITALS
DIASTOLIC BLOOD PRESSURE: 60 MMHG | RESPIRATION RATE: 20 BRPM | OXYGEN SATURATION: 99 % | HEIGHT: 65 IN | SYSTOLIC BLOOD PRESSURE: 99 MMHG | TEMPERATURE: 97 F | WEIGHT: 98.11 LBS | HEART RATE: 54 BPM

## 2021-08-13 DIAGNOSIS — Z95.0 PRESENCE OF CARDIAC PACEMAKER: Chronic | ICD-10-CM

## 2021-08-13 DIAGNOSIS — Z95.0 PRESENCE OF CARDIAC PACEMAKER: ICD-10-CM

## 2021-08-13 DIAGNOSIS — Z87.448 PERSONAL HISTORY OF OTHER DISEASES OF URINARY SYSTEM: Chronic | ICD-10-CM

## 2021-08-13 DIAGNOSIS — C68.9 MALIGNANT NEOPLASM OF URINARY ORGAN, UNSPECIFIED: ICD-10-CM

## 2021-08-13 DIAGNOSIS — Z98.89 OTHER SPECIFIED POSTPROCEDURAL STATES: Chronic | ICD-10-CM

## 2021-08-13 DIAGNOSIS — C67.9 MALIGNANT NEOPLASM OF BLADDER, UNSPECIFIED: ICD-10-CM

## 2021-08-13 LAB
APPEARANCE: ABNORMAL
BACTERIA UR CULT: NORMAL
BACTERIA: ABNORMAL
BILIRUBIN URINE: NEGATIVE
BLOOD URINE: ABNORMAL
COLOR: ABNORMAL
GLUCOSE QUALITATIVE U: NEGATIVE
HCG UR QL: NEGATIVE — SIGNIFICANT CHANGE UP
HYALINE CASTS: 0 /LPF
KETONES URINE: NEGATIVE
LEUKOCYTE ESTERASE URINE: NEGATIVE
MICROSCOPIC-UA: NORMAL
NITRITE URINE: POSITIVE
PH URINE: 8.5
PROTEIN URINE: ABNORMAL
RED BLOOD CELLS URINE: 120 /HPF
SPECIFIC GRAVITY URINE: 1.02
SQUAMOUS EPITHELIAL CELLS: 3 /HPF
UROBILINOGEN URINE: NORMAL
WHITE BLOOD CELLS URINE: 5 /HPF

## 2021-08-13 RX ORDER — SODIUM CHLORIDE 9 MG/ML
1000 INJECTION INTRAMUSCULAR; INTRAVENOUS; SUBCUTANEOUS
Refills: 0 | Status: DISCONTINUED | OUTPATIENT
Start: 2021-08-31 | End: 2021-09-14

## 2021-08-13 NOTE — H&P PST ADULT - HISTORY OF PRESENT ILLNESS
55 y.o. female reports  presents to PST for evaluation  55 y.o. female with h/o HTN, HLD, bradycardia, s/p PPM insertion 2010, asthma, anemia, presents to PST for evaluation with preop diagnosis of malignant neoplasm of bladder unspecified, reports diagnosed with bladder tumor in 2019, underwent 3 cycles of chemotherapy, (last 01/2020), s/p right percutaneous nephrostomy tube placement due to hydronephrosis, c/o hematuria, intermittent left flank pain, scheduled for transurethral resection of bladder tumor

## 2021-08-13 NOTE — H&P PST ADULT - NSANTHOSAYNRD_GEN_A_CORE
No. LAURNE screening performed.  STOP BANG Legend: 0-2 = LOW Risk; 3-4 = INTERMEDIATE Risk; 5-8 = HIGH Risk

## 2021-08-13 NOTE — H&P PST ADULT - PROBLEM SELECTOR PLAN 2
copy of the card faxed to OR booking copy of the card faxed to OR booking  pt has appt for cardiac eval as per surgeons request, h/o bradycardia and PPM placement, copy of eval requested

## 2021-08-13 NOTE — H&P PST ADULT - PROBLEM SELECTOR PLAN 1
pt scheduled for transurethral resection of bladder tumor on 08/31/21  Preop instructions provided. Pt verbalized understanding.    written and verbal instructions with teach back on chlorhexidine shampoo provided,  pt verbalized understanding with returned demonstration   no COVID vaccine  pt confirmed has appt for COVID test scheduled 72 hrs preop

## 2021-08-13 NOTE — H&P PST ADULT - BP NONINVASIVE SYSTOLIC (MM HG)
99

## 2021-08-13 NOTE — H&P PST ADULT - NSICDXPASTSURGICALHX_GEN_ALL_CORE_FT
PAST SURGICAL HISTORY:  H/O hydronephrosis s/p Right Perc nephrostomy tube placement 02/2021, exchange 06/2021    H/O myomectomy     Presence of cardiac pacemaker

## 2021-08-13 NOTE — H&P PST ADULT - HEMATOLOGY/LYMPHATICS COMMENTS
h/o anemia "because I had fibroids and had heavy bleeding, I used to get iron infusions before I had myomectomy"

## 2021-08-28 ENCOUNTER — APPOINTMENT (OUTPATIENT)
Dept: DISASTER EMERGENCY | Facility: CLINIC | Age: 56
End: 2021-08-28

## 2021-08-29 LAB — SARS-COV-2 N GENE NPH QL NAA+PROBE: NOT DETECTED

## 2021-08-30 ENCOUNTER — TRANSCRIPTION ENCOUNTER (OUTPATIENT)
Age: 56
End: 2021-08-30

## 2021-08-31 ENCOUNTER — OUTPATIENT (OUTPATIENT)
Dept: OUTPATIENT SERVICES | Facility: HOSPITAL | Age: 56
LOS: 1 days | Discharge: HOME | End: 2021-08-31
Payer: MEDICARE

## 2021-08-31 ENCOUNTER — RESULT REVIEW (OUTPATIENT)
Age: 56
End: 2021-08-31

## 2021-08-31 ENCOUNTER — APPOINTMENT (OUTPATIENT)
Dept: UROLOGY | Facility: HOSPITAL | Age: 56
End: 2021-08-31

## 2021-08-31 VITALS
OXYGEN SATURATION: 100 % | WEIGHT: 98.11 LBS | SYSTOLIC BLOOD PRESSURE: 152 MMHG | HEART RATE: 62 BPM | RESPIRATION RATE: 18 BRPM | TEMPERATURE: 98 F | DIASTOLIC BLOOD PRESSURE: 81 MMHG | HEIGHT: 65 IN

## 2021-08-31 VITALS
OXYGEN SATURATION: 95 % | SYSTOLIC BLOOD PRESSURE: 134 MMHG | RESPIRATION RATE: 15 BRPM | DIASTOLIC BLOOD PRESSURE: 74 MMHG | HEART RATE: 62 BPM

## 2021-08-31 DIAGNOSIS — Z98.89 OTHER SPECIFIED POSTPROCEDURAL STATES: Chronic | ICD-10-CM

## 2021-08-31 DIAGNOSIS — C67.9 MALIGNANT NEOPLASM OF BLADDER, UNSPECIFIED: ICD-10-CM

## 2021-08-31 DIAGNOSIS — Z87.448 PERSONAL HISTORY OF OTHER DISEASES OF URINARY SYSTEM: Chronic | ICD-10-CM

## 2021-08-31 DIAGNOSIS — Z95.0 PRESENCE OF CARDIAC PACEMAKER: Chronic | ICD-10-CM

## 2021-08-31 LAB
GLUCOSE BLDC GLUCOMTR-MCNC: 148 MG/DL — HIGH (ref 70–99)
HCG UR QL: NEGATIVE — SIGNIFICANT CHANGE UP

## 2021-08-31 PROCEDURE — 88307 TISSUE EXAM BY PATHOLOGIST: CPT | Mod: 26

## 2021-08-31 PROCEDURE — 52235 CYSTOSCOPY AND TREATMENT: CPT

## 2021-08-31 RX ORDER — ACETAMINOPHEN 500 MG
650 TABLET ORAL ONCE
Refills: 0 | Status: COMPLETED | OUTPATIENT
Start: 2021-08-31 | End: 2021-08-31

## 2021-08-31 RX ORDER — ONDANSETRON 8 MG/1
4 TABLET, FILM COATED ORAL ONCE
Refills: 0 | Status: COMPLETED | OUTPATIENT
Start: 2021-08-31 | End: 2021-08-31

## 2021-08-31 RX ORDER — SODIUM CHLORIDE 9 MG/ML
500 INJECTION, SOLUTION INTRAVENOUS ONCE
Refills: 0 | Status: COMPLETED | OUTPATIENT
Start: 2021-08-31 | End: 2021-08-31

## 2021-08-31 RX ORDER — FENTANYL CITRATE 50 UG/ML
25 INJECTION INTRAVENOUS
Refills: 0 | Status: DISCONTINUED | OUTPATIENT
Start: 2021-08-31 | End: 2021-08-31

## 2021-08-31 RX ADMIN — Medication 650 MILLIGRAM(S): at 16:07

## 2021-08-31 RX ADMIN — ONDANSETRON 4 MILLIGRAM(S): 8 TABLET, FILM COATED ORAL at 13:47

## 2021-08-31 RX ADMIN — SODIUM CHLORIDE 1000 MILLILITER(S): 9 INJECTION, SOLUTION INTRAVENOUS at 14:35

## 2021-08-31 RX ADMIN — SODIUM CHLORIDE 30 MILLILITER(S): 9 INJECTION INTRAMUSCULAR; INTRAVENOUS; SUBCUTANEOUS at 10:14

## 2021-08-31 NOTE — ASU DISCHARGE PLAN (ADULT/PEDIATRIC) - POST OP PHONE #
[phone] pt. granted permission to leave message /and or speak with whoever answers the phone.

## 2021-08-31 NOTE — ASU DISCHARGE PLAN (ADULT/PEDIATRIC) - CARE PROVIDER_API CALL
Renay Trinidad)  Urology  [address] [place] [address]  Phone: [phone]  Fax: [phone]  Follow Up Time: 2 weeks

## 2021-08-31 NOTE — ASU DISCHARGE PLAN (ADULT/PEDIATRIC) - NURSING INSTRUCTIONS
DO NOT take any Tylenol (Acetaminophen) or narcotics containing Tylenol until after 10pm . You received Tylenol during your operation and it can cause damage to your liver if too much is taken within a 24 hour time period.

## 2021-08-31 NOTE — ASU DISCHARGE PLAN (ADULT/PEDIATRIC) - ASU DC SPECIAL INSTRUCTIONSFT
Discharge Instructions: TURP/TURBT    •	Catheter: Some patients are sent home with a toth catheter while others go home urinating on their own. If you still have a catheter, the nurses will review instructions and care before you go home. For men, you will have a prescription for 1% lidocaine jelly to apply to the tip of your penis as needed for catheter related discomfort.  •	General: It is common to have blood in the urine after your procedure. It may be pink or even red; inform your doctor if you have a significant amount of clot in the urine or if you are unable to void at all or if your catheter stops draining. It is not uncommon to have some burning when you urinate, this will gradually improve. With a catheter in place, it is not uncommon to have occasional leakage of urine or blood around the catheter. Please call your urologist if this is excessive and/or the urine is not draining through the catheter into the bag.  •	Bathing: You may shower or bathe. If going home with Toth, shower only until catheter is removed.  •	Diet: You may resume your regular diet and regular medication regimen.  •	Pain: You may take Tylenol (acetaminophen) 650-975mg and/or Motrin (ibuprofen) 400-600mg, available over the counter, for pain every 6 hours as needed. Do not exceed 4000 milligrams of Tylenol (acetaminophen) daily. You may alternate these medications such that you take either one every 3 hours.  •	Antibiotics: You may be given a prescription for an antibiotic, please take this medication as instructed and be sure to complete entire course.  •	Stool softeners: Do not allow yourself to become constipated as straining will cause bleeding. Take stool softeners (ex. Colace) or a laxative (ex. Senekot, ExLax), available over the counter, if needed.  •	Activity: No heavy lifting or strenuous exercise until you are evaluated at your post-operative appointment. Otherwise, you may return to your usual level of activity.  •	Anticoagulation: If you are taking any blood thinning medications, please discuss with your urologist prior to restarting these medications unless otherwise specified.  •	Follow-up: If you did not already schedule your post-operative appointment, please call your urologist to schedule a follow-up appointment.  •	Call your urologist if: You have any bleeding that does not stop, inability to void >8 hours, fever over 100.4 F, chills, persistent nausea/vomiting, or if your pain is not controlled on your discharge pain medications.

## 2021-08-31 NOTE — BRIEF OPERATIVE NOTE - NSICDXBRIEFPROCEDURE_GEN_ALL_CORE_FT
PROCEDURES:  Cystoscopy with transurethral resection of bladder tumor (TURBT), with instillation of mitomycin C if indicated 31-Aug-2021 12:50:31  Peggy Levin

## 2021-09-01 ENCOUNTER — EMERGENCY (EMERGENCY)
Facility: HOSPITAL | Age: 56
LOS: 1 days | Discharge: HOME | End: 2021-09-01
Attending: EMERGENCY MEDICINE
Payer: MEDICARE

## 2021-09-01 ENCOUNTER — APPOINTMENT (OUTPATIENT)
Dept: UROLOGY | Facility: CLINIC | Age: 56
End: 2021-09-01

## 2021-09-01 ENCOUNTER — NON-APPOINTMENT (OUTPATIENT)
Age: 56
End: 2021-09-01

## 2021-09-01 VITALS
RESPIRATION RATE: 16 BRPM | HEART RATE: 73 BPM | OXYGEN SATURATION: 98 % | SYSTOLIC BLOOD PRESSURE: 124 MMHG | HEIGHT: 65 IN | DIASTOLIC BLOOD PRESSURE: 80 MMHG | TEMPERATURE: 98 F | WEIGHT: 95.9 LBS

## 2021-09-01 VITALS
HEART RATE: 65 BPM | SYSTOLIC BLOOD PRESSURE: 157 MMHG | OXYGEN SATURATION: 99 % | DIASTOLIC BLOOD PRESSURE: 88 MMHG | RESPIRATION RATE: 16 BRPM | TEMPERATURE: 98 F

## 2021-09-01 DIAGNOSIS — Z87.448 PERSONAL HISTORY OF OTHER DISEASES OF URINARY SYSTEM: Chronic | ICD-10-CM

## 2021-09-01 DIAGNOSIS — Z98.89 OTHER SPECIFIED POSTPROCEDURAL STATES: Chronic | ICD-10-CM

## 2021-09-01 DIAGNOSIS — Z95.0 PRESENCE OF CARDIAC PACEMAKER: Chronic | ICD-10-CM

## 2021-09-01 LAB
ACETONE SERPL-MCNC: NEGATIVE — SIGNIFICANT CHANGE UP
ALBUMIN SERPL ELPH-MCNC: 3.1 G/DL — LOW (ref 3.5–5)
ALP SERPL-CCNC: 87 U/L — SIGNIFICANT CHANGE UP (ref 40–120)
ALT FLD-CCNC: 16 U/L DA — SIGNIFICANT CHANGE UP (ref 10–60)
ANION GAP SERPL CALC-SCNC: 2 MMOL/L — LOW (ref 5–17)
APPEARANCE UR: CLEAR — SIGNIFICANT CHANGE UP
AST SERPL-CCNC: 26 U/L — SIGNIFICANT CHANGE UP (ref 10–40)
BACTERIA # UR AUTO: ABNORMAL /HPF
BASOPHILS # BLD AUTO: 0.02 K/UL — SIGNIFICANT CHANGE UP (ref 0–0.2)
BASOPHILS NFR BLD AUTO: 0.2 % — SIGNIFICANT CHANGE UP (ref 0–2)
BILIRUB SERPL-MCNC: 0.4 MG/DL — SIGNIFICANT CHANGE UP (ref 0.2–1.2)
BILIRUB UR-MCNC: NEGATIVE — SIGNIFICANT CHANGE UP
BUN SERPL-MCNC: 22 MG/DL — HIGH (ref 7–18)
CALCIUM SERPL-MCNC: 9.2 MG/DL — SIGNIFICANT CHANGE UP (ref 8.4–10.5)
CHLORIDE SERPL-SCNC: 104 MMOL/L — SIGNIFICANT CHANGE UP (ref 96–108)
CO2 SERPL-SCNC: 32 MMOL/L — HIGH (ref 22–31)
COLOR SPEC: YELLOW — SIGNIFICANT CHANGE UP
COMMENT - URINE: SIGNIFICANT CHANGE UP
CREAT SERPL-MCNC: 1.25 MG/DL — SIGNIFICANT CHANGE UP (ref 0.5–1.3)
DIFF PNL FLD: ABNORMAL
EOSINOPHIL # BLD AUTO: 0.13 K/UL — SIGNIFICANT CHANGE UP (ref 0–0.5)
EOSINOPHIL NFR BLD AUTO: 1.5 % — SIGNIFICANT CHANGE UP (ref 0–6)
EPI CELLS # UR: ABNORMAL /HPF
GLUCOSE SERPL-MCNC: 90 MG/DL — SIGNIFICANT CHANGE UP (ref 70–99)
GLUCOSE UR QL: NEGATIVE — SIGNIFICANT CHANGE UP
HCT VFR BLD CALC: 38 % — SIGNIFICANT CHANGE UP (ref 34.5–45)
HGB BLD-MCNC: 12.7 G/DL — SIGNIFICANT CHANGE UP (ref 11.5–15.5)
IMM GRANULOCYTES NFR BLD AUTO: 0.3 % — SIGNIFICANT CHANGE UP (ref 0–1.5)
KETONES UR-MCNC: NEGATIVE — SIGNIFICANT CHANGE UP
LEUKOCYTE ESTERASE UR-ACNC: ABNORMAL
LYMPHOCYTES # BLD AUTO: 1.56 K/UL — SIGNIFICANT CHANGE UP (ref 1–3.3)
LYMPHOCYTES # BLD AUTO: 18 % — SIGNIFICANT CHANGE UP (ref 13–44)
MAGNESIUM SERPL-MCNC: 2.3 MG/DL — SIGNIFICANT CHANGE UP (ref 1.6–2.6)
MCHC RBC-ENTMCNC: 31.1 PG — SIGNIFICANT CHANGE UP (ref 27–34)
MCHC RBC-ENTMCNC: 33.4 GM/DL — SIGNIFICANT CHANGE UP (ref 32–36)
MCV RBC AUTO: 92.9 FL — SIGNIFICANT CHANGE UP (ref 80–100)
MONOCYTES # BLD AUTO: 0.54 K/UL — SIGNIFICANT CHANGE UP (ref 0–0.9)
MONOCYTES NFR BLD AUTO: 6.2 % — SIGNIFICANT CHANGE UP (ref 2–14)
NEUTROPHILS # BLD AUTO: 6.38 K/UL — SIGNIFICANT CHANGE UP (ref 1.8–7.4)
NEUTROPHILS NFR BLD AUTO: 73.8 % — SIGNIFICANT CHANGE UP (ref 43–77)
NITRITE UR-MCNC: NEGATIVE — SIGNIFICANT CHANGE UP
NRBC # BLD: 0 /100 WBCS — SIGNIFICANT CHANGE UP (ref 0–0)
PH UR: 5 — SIGNIFICANT CHANGE UP (ref 5–8)
PLATELET # BLD AUTO: 228 K/UL — SIGNIFICANT CHANGE UP (ref 150–400)
POTASSIUM SERPL-MCNC: 4 MMOL/L — SIGNIFICANT CHANGE UP (ref 3.5–5.3)
POTASSIUM SERPL-SCNC: 4 MMOL/L — SIGNIFICANT CHANGE UP (ref 3.5–5.3)
PROT SERPL-MCNC: 7.1 G/DL — SIGNIFICANT CHANGE UP (ref 6–8.3)
PROT UR-MCNC: 100
RBC # BLD: 4.09 M/UL — SIGNIFICANT CHANGE UP (ref 3.8–5.2)
RBC # FLD: 13.2 % — SIGNIFICANT CHANGE UP (ref 10.3–14.5)
RBC CASTS # UR COMP ASSIST: ABNORMAL /HPF (ref 0–2)
SODIUM SERPL-SCNC: 138 MMOL/L — SIGNIFICANT CHANGE UP (ref 135–145)
SP GR SPEC: 1.01 — SIGNIFICANT CHANGE UP (ref 1.01–1.02)
UROBILINOGEN FLD QL: NEGATIVE — SIGNIFICANT CHANGE UP
WBC # BLD: 8.66 K/UL — SIGNIFICANT CHANGE UP (ref 3.8–10.5)
WBC # FLD AUTO: 8.66 K/UL — SIGNIFICANT CHANGE UP (ref 3.8–10.5)
WBC UR QL: ABNORMAL /HPF (ref 0–5)

## 2021-09-01 PROCEDURE — 87186 SC STD MICRODIL/AGAR DIL: CPT

## 2021-09-01 PROCEDURE — 83735 ASSAY OF MAGNESIUM: CPT

## 2021-09-01 PROCEDURE — 87184 SC STD DISK METHOD PER PLATE: CPT

## 2021-09-01 PROCEDURE — 36415 COLL VENOUS BLD VENIPUNCTURE: CPT

## 2021-09-01 PROCEDURE — 99284 EMERGENCY DEPT VISIT MOD MDM: CPT

## 2021-09-01 PROCEDURE — 82009 KETONE BODYS QUAL: CPT

## 2021-09-01 PROCEDURE — 87086 URINE CULTURE/COLONY COUNT: CPT

## 2021-09-01 PROCEDURE — 85025 COMPLETE CBC W/AUTO DIFF WBC: CPT

## 2021-09-01 PROCEDURE — 80053 COMPREHEN METABOLIC PANEL: CPT

## 2021-09-01 PROCEDURE — 87077 CULTURE AEROBIC IDENTIFY: CPT

## 2021-09-01 PROCEDURE — 81001 URINALYSIS AUTO W/SCOPE: CPT

## 2021-09-01 RX ORDER — ACETAMINOPHEN 500 MG
650 TABLET ORAL ONCE
Refills: 0 | Status: COMPLETED | OUTPATIENT
Start: 2021-09-01 | End: 2021-09-01

## 2021-09-01 RX ORDER — SODIUM CHLORIDE 9 MG/ML
1000 INJECTION INTRAMUSCULAR; INTRAVENOUS; SUBCUTANEOUS ONCE
Refills: 0 | Status: COMPLETED | OUTPATIENT
Start: 2021-09-01 | End: 2021-09-01

## 2021-09-01 RX ADMIN — Medication 650 MILLIGRAM(S): at 20:55

## 2021-09-01 RX ADMIN — Medication 650 MILLIGRAM(S): at 20:49

## 2021-09-01 RX ADMIN — SODIUM CHLORIDE 1000 MILLILITER(S): 9 INJECTION INTRAMUSCULAR; INTRAVENOUS; SUBCUTANEOUS at 17:36

## 2021-09-01 NOTE — ED PROVIDER NOTE - OBJECTIVE STATEMENT
55 y.o. female postmenopausal pt claims had 2nd bladder bx done yest., pt also had Rt nephrostomy placed in 2/21 2/2 hydronephrosis.  Pt urinated 2x yest., noted small clots, since no urine output.  Pt noted with urine draining into nephrostomy bag, no fever, v, mild chills, nausea, pt took tylenol for her suprapubic pain.  Last BM yest.

## 2021-09-01 NOTE — ED PROVIDER NOTE - NEUROLOGICAL, MLM
[Fall prevention counseling provided] : Fall prevention counseling provided [Use proper foot wear] : Use proper foot wear Alert and oriented, no focal deficits, no motor or sensory deficits.

## 2021-09-01 NOTE — ED ADULT NURSE NOTE - NSIMPLEMENTINTERV_GEN_ALL_ED
Implemented All Universal Safety Interventions:  [place] to call system. Call bell, personal items and telephone within reach. Instruct patient to call for assistance. Room bathroom lighting operational. Non-slip footwear when patient is off stretcher. Physically safe environment: no spills, clutter or unnecessary equipment. Stretcher in lowest position, wheels locked, appropriate side rails in place.

## 2021-09-01 NOTE — ED PROVIDER NOTE - CLINICAL SUMMARY MEDICAL DECISION MAKING FREE TEXT BOX
pt with no urine output, bladder scan done only 11cc urine noted, pt also seen by  resident, pt's suprapubic irritation mostly 2/2 multiple bladder tumors.  Will get labs, give IVF, reassess

## 2021-09-01 NOTE — CONSULT NOTE ADULT - SUBJECTIVE AND OBJECTIVE BOX
HPI  55F complex PMHx including HTN, HLD, bradycardia, s/p PPM insertion 2010, asthma, anemia, advanced bladder cancer likely with spread to RP (+LN) w/ nonfunctioning left kidney, right kidney managed w/ PCN most recently s/p TURBT with Dr. Trinidad on 8/31 at [facility]. Intraop, was noted to have a large tumor burden and single biopsy was taken for pathology. Pt presents today for sensation of suprapubic pressure and difficulty urinating. Voided x2 yesterday with scant clot. Today has not voided. NT in place draining well. No other systemic symptoms. '    PAST MEDICAL & SURGICAL HISTORY:  Anemia    Asthma    HLD (hyperlipidemia)    Pacemaker    Bladder cancer    HTN (hypertension)    Parathyroid tumor    Liver cyst    Hydronephrosis    H/O myomectomy    Presence of cardiac pacemaker    H/O hydronephrosis  s/p Right Perc nephrostomy tube placement 02/2021, exchange 06/2021        MEDICATIONS  (STANDING):  sodium chloride 0.9% Bolus 1000 milliLiter(s) IV Bolus once    MEDICATIONS  (PRN):      FAMILY HISTORY:  Family history of prostate cancer (Father)    Family history of CHF (congestive heart failure) (Father)    Family history of atrial fibrillation (Father)        Allergies    No Known Allergies    Intolerances        SOCIAL HISTORY:    REVIEW OF SYSTEMS: Otherwise negative as stated in HPI    Physical Exam  Vital signs  T(C): 36.6 (09-01-21 @ 16:28), Max: 36.6 (09-01-21 @ 16:28)  HR: 73 (09-01-21 @ 16:28)  BP: 124/80 (09-01-21 @ 16:28)  SpO2: 98% (09-01-21 @ 16:28)  Wt(kg): --    Output      Gen: awake and alert. NAD. slightly emaciated   Pulm: Normal work of breathing on RA  : R PCN in place. 11cc on bladder scan. +ttp suprapubic   Psych:  AOx3  Neuro: No focal deficits.       LABS:    pending               Urine Cx:  Blood Cx:    RADIOLOGY:

## 2021-09-01 NOTE — ED ADULT NURSE NOTE - OBJECTIVE STATEMENT
pt is here c/o inability to urinate s/p bladder sx yesterday, pt has nephrostomy tube, bladder scan 11 ml

## 2021-09-01 NOTE — ED PROVIDER NOTE - NSICDXPASTSURGICALHX_GEN_ALL_CORE_FT
Post op gtt instructions reviewed with patient. PAST SURGICAL HISTORY:  H/O hydronephrosis s/p Right Perc nephrostomy tube placement 02/2021, exchange 06/2021    H/O myomectomy     Presence of cardiac pacemaker

## 2021-09-01 NOTE — ED PROVIDER NOTE - PROGRESS NOTE DETAILS
pt not with lots of urine in nephrostomy bag, pt still with no urine output, bladder scan-0ml, will d/c home, advised to f/u with .  pt's pain is 2/2 multiple bladder tumors.

## 2021-09-01 NOTE — CONSULT NOTE ADULT - ASSESSMENT
55F hx of advanced bladder cancer (likely metastatic) found to have large tumor burden on TURBT 8/31 presents to [facility] with suprapubic pain and difficulty urinating. However, pt noted to only have 11cc in bladder on scan    --Pt R PCN in place and draining. L kidney nonfunctioning. No intervention needed  --11cc in bladder. recommend AGAINST placement of Lloyd at this time. Given pts tumor burden in bladder, catheter placement may worsen hematuria.   --No admission/intervention needed per urology. Can d/c from urologic perspective     D/w Dr Trinidad, pt's outside urologist.   D/w Dr Pro  55F hx of advanced bladder cancer (likely metastatic) found to have large tumor burden on TURBT 8/31 presents to [facility] with suprapubic pain and difficulty urinating. However, pt noted to only have 11cc in bladder on scan    --Pt R PCN in place and draining. L kidney nonfunctioning. No intervention needed  --11cc in bladder. recommend AGAINST placement of Lloyd at this time. Given pts tumor burden in bladder, catheter placement may worsen hematuria.   --No admission/intervention needed per urology. Can d/c from urologic perspective     D/w Dr Trinidad, pt's outside urologist.   will d/w Dr Pro  55F hx of advanced bladder cancer (likely metastatic) found to have large tumor burden on TURBT 8/31 presents to [facility] with suprapubic pain and difficulty urinating. However, pt noted to only have 11cc in bladder on scan    --Pt R PCN in place and draining. L kidney nonfunctioning. No intervention needed  --11cc in bladder. recommend AGAINST placement of Lloyd at this time. Given pts tumor burden in bladder, catheter placement may worsen hematuria.   --No admission/intervention needed per urology. Can d/c from urologic perspective     D/w Dr Trinidad, pt's primary urologist.   will d/w Dr Pro  55F hx of advanced bladder cancer (likely metastatic) found to have large tumor burden on TURBT 8/31 presents to [facility] with suprapubic pain and difficulty urinating. However, pt noted to only have 11cc in bladder on scan    --Pt R PCN in place and draining. L kidney nonfunctioning. No intervention needed  --11cc in bladder. recommend AGAINST placement of Lloyd at this time. Given pts tumor burden in bladder, catheter placement may worsen hematuria.   --No admission/intervention needed per urology. Can d/c from urologic perspective     D/w Dr Trinidad, pt's primary urologist.   D/w Dr Pro

## 2021-09-01 NOTE — ED PROVIDER NOTE - PATIENT PORTAL LINK FT
You can access the FollowMyHealth Patient Portal offered by [facility] by registering at the following website: [email or web address]. By joining [employer]’s FollowMyHealth portal, you will also be able to view your health information using other applications (apps) compatible with our system.

## 2021-09-02 ENCOUNTER — APPOINTMENT (OUTPATIENT)
Dept: UROLOGY | Facility: CLINIC | Age: 56
End: 2021-09-02
Payer: MEDICARE

## 2021-09-02 PROCEDURE — 99212 OFFICE O/P EST SF 10 MIN: CPT

## 2021-09-03 ENCOUNTER — APPOINTMENT (OUTPATIENT)
Dept: UROLOGY | Facility: CLINIC | Age: 56
End: 2021-09-03
Payer: MEDICARE

## 2021-09-03 LAB — SURGICAL PATHOLOGY STUDY: SIGNIFICANT CHANGE UP

## 2021-09-03 PROCEDURE — 99213 OFFICE O/P EST LOW 20 MIN: CPT

## 2021-09-04 LAB
-  AMIKACIN: SIGNIFICANT CHANGE UP
-  AMPICILLIN/SULBACTAM: SIGNIFICANT CHANGE UP
-  CEFEPIME: SIGNIFICANT CHANGE UP
-  CEFTAZIDIME: SIGNIFICANT CHANGE UP
-  CEFTRIAXONE: SIGNIFICANT CHANGE UP
-  CIPROFLOXACIN: SIGNIFICANT CHANGE UP
-  GENTAMICIN: SIGNIFICANT CHANGE UP
-  IMIPENEM: SIGNIFICANT CHANGE UP
-  LEVOFLOXACIN: SIGNIFICANT CHANGE UP
-  MEROPENEM: SIGNIFICANT CHANGE UP
-  PIPERACILLIN/TAZOBACTAM: SIGNIFICANT CHANGE UP
-  TOBRAMYCIN: SIGNIFICANT CHANGE UP
-  TRIMETHOPRIM/SULFAMETHOXAZOLE: SIGNIFICANT CHANGE UP
CULTURE RESULTS: SIGNIFICANT CHANGE UP
METHOD TYPE: SIGNIFICANT CHANGE UP
METHOD TYPE: SIGNIFICANT CHANGE UP
ORGANISM # SPEC MICROSCOPIC CNT: SIGNIFICANT CHANGE UP
SPECIMEN SOURCE: SIGNIFICANT CHANGE UP

## 2021-09-07 ENCOUNTER — NON-APPOINTMENT (OUTPATIENT)
Age: 56
End: 2021-09-07

## 2021-09-07 LAB
ANION GAP SERPL CALC-SCNC: 12 MMOL/L
BASOPHILS # BLD AUTO: 0.02 K/UL
BASOPHILS NFR BLD AUTO: 0.2 %
BUN SERPL-MCNC: 16 MG/DL
CALCIUM SERPL-MCNC: 9.6 MG/DL
CHLORIDE SERPL-SCNC: 102 MMOL/L
CO2 SERPL-SCNC: 24 MMOL/L
CREAT SERPL-MCNC: 1 MG/DL
EOSINOPHIL # BLD AUTO: 0.1 K/UL
EOSINOPHIL NFR BLD AUTO: 1.2 %
GLUCOSE SERPL-MCNC: 82 MG/DL
HCT VFR BLD CALC: 41.1 %
HGB BLD-MCNC: 12.9 G/DL
IMM GRANULOCYTES NFR BLD AUTO: 0.2 %
LYMPHOCYTES # BLD AUTO: 1.88 K/UL
LYMPHOCYTES NFR BLD AUTO: 23.4 %
MAN DIFF?: NORMAL
MCHC RBC-ENTMCNC: 30.4 PG
MCHC RBC-ENTMCNC: 31.4 GM/DL
MCV RBC AUTO: 96.7 FL
MONOCYTES # BLD AUTO: 0.59 K/UL
MONOCYTES NFR BLD AUTO: 7.4 %
NEUTROPHILS # BLD AUTO: 5.41 K/UL
NEUTROPHILS NFR BLD AUTO: 67.6 %
PLATELET # BLD AUTO: 231 K/UL
POTASSIUM SERPL-SCNC: 4.1 MMOL/L
RBC # BLD: 4.25 M/UL
RBC # FLD: 13.3 %
SODIUM SERPL-SCNC: 138 MMOL/L
WBC # FLD AUTO: 8.02 K/UL

## 2021-09-07 RX ORDER — MOXIFLOXACIN HYDROCHLORIDE TABLETS, 400 MG 400 MG/1
1 TABLET, FILM COATED ORAL
Qty: 14 | Refills: 0
Start: 2021-09-07 | End: 2021-09-13

## 2021-09-07 NOTE — HISTORY OF PRESENT ILLNESS
[FreeTextEntry1] : 56yo female with cc of TCC. Pt was seen by Dr Davidson back in 8/2019. At that time she was noted to have mulriple comorbidities. She had hematuria and underwent eval with CT revealing a probable UP transitional cell ca based on L URS and abnormal cytology that localized to L but bx was non-contributory. Plan was made for probable L nephroU. Seems pt was lost to follow-up. She reports she did not have uppertract disease and only had blockage managed with stent. Review of records showed HG Ta TCC as above and bladder with HG T1. Scans showed nodularity on L concerning for extension outside bladder. She had PET with PET avid LAD but Had subsequent bx that was negative. Plan made for neoadjuvant chemo (GC) followed by L nephro-U and cystectomy. She had 2 cycles and was admitted to hospital in Jan for CRYSTAL and R flank pain. Had new R hydro. CRYSTAL improved and now at recent ER visit was back to 1.0. She reports PET in April at [facility] but no records. Plan made for repeat PET given time interval. This showed a new L para-aortic LN suspicious for mets that was PET avid. Prior node does not appear avid. Discussed bx with IR but MRI recommended. Pt cant do because of pacemaker. \par \par Plan made for TURBT for pathology. She had procedure on Tuesday. She had copious bladder tumor. Biopsies (superficial and deep) were taken along L trigone. Pt discharged home on POD#1 she developed SP discomfort and feeling of need to void. She continues to put out from R PCN but no urine from bladder. She went to  ER. Bladder scan showed 11cc. Labs stable (Hct and Cr). COmes in today with similar complaints and more discomfort on L with radiation into L flank. Pt with severe hydronephrosis and atrophy on L. Bladder scan again shows no distension. She does endorse mild constipation.

## 2021-09-07 NOTE — PHYSICAL EXAM
[General Appearance - In No Acute Distress] : no acute distress [Abdomen Soft] : soft [Oriented To Time, Place, And Person] : oriented to person, place, and time [Exaggerated Use Of Accessory Muscles For Inspiration] : no accessory muscle use [FreeTextEntry1] : R PCN in place with clear yellow urine

## 2021-09-07 NOTE — ASSESSMENT
[FreeTextEntry1] : Initially with organ confined bladder cancer but concern for high risk (T3) with L hydro and atrophy. PET avid new node. Unclear if ammenable to bx. Unable to have MRI. Now POD#2 from TURBT for path. Low bladder UOP and discomfort. Suspect clot within bladder vs irritation post surgery. \par --Tylenol prn\par --bowel regimen\par --Encourage fluid intake\par --CT if sx worsen

## 2021-09-08 ENCOUNTER — NON-APPOINTMENT (OUTPATIENT)
Age: 56
End: 2021-09-08

## 2021-09-09 ENCOUNTER — NON-APPOINTMENT (OUTPATIENT)
Age: 56
End: 2021-09-09

## 2021-09-09 NOTE — HISTORY OF PRESENT ILLNESS
[FreeTextEntry1] : 56yo female with cc of TCC. Pt was seen by Dr Davidson back in 8/2019. At that time she was noted to have mulriple comorbidities. She had hematuria and underwent eval with CT revealing a probable UP transitional cell ca based on L URS and abnormal cytology that localized to L but bx was non-contributory. Plan was made for probable L nephroU. Seems pt was lost to follow-up. She reports she did not have uppertract disease and only had blockage managed with stent. Review of records showed HG Ta TCC as above and bladder with HG T1. Scans showed nodularity on L concerning for extension outside bladder. She had PET with PET avid LAD but Had subsequent bx that was negative. Plan made for neoadjuvant chemo (GC) followed by L nephro-U and cystectomy. She had 2 cycles and was admitted to hospital in Jan for CRYSTAL and R flank pain. Had new R hydro. CRYSTAL improved and now at recent ER visit was back to 1.0. She reports PET in April at [facility] but no records. Plan made for repeat PET given time interval. This showed a new L para-aortic LN suspicious for mets that was PET avid. Prior node does not appear avid. Discussed bx with IR but MRI recommended. Pt cant do because of pacemaker. \par \par Plan made for TURBT for pathology. She had procedure on Tuesday. She had copious bladder tumor. Biopsies (superficial and deep) were taken along L trigone. Pt discharged home on POD#1 she developed SP discomfort and feeling of need to void. She continues to put out from R PCN but no urine from bladder. She went to  ER. Bladder scan showed 11cc. Labs stable (Hct and Cr). Seen yesterday with similar complaints and more discomfort on L with radiation into L flank. Pt with severe hydronephrosis and atrophy on L. Bladder scan again showed no distension. She does endorse mild constipation. She come in today for follow-up again. Notes she has had come urine per urethra that is bloody. No longer with flank pain. Feeling abd is "tight". Tolerating po. No fever or chills. \par \par Placed 20F six eye catheter with no real urine obtained. Performed some irrigation without any clots.

## 2021-09-09 NOTE — PHYSICAL EXAM
[General Appearance - In No Acute Distress] : no acute distress [Abdomen Soft] : soft [Costovertebral Angle Tenderness] : no ~M costovertebral angle tenderness [FreeTextEntry1] : R PCN in place with clear yellow urine [Exaggerated Use Of Accessory Muscles For Inspiration] : no accessory muscle use [Oriented To Time, Place, And Person] : oriented to person, place, and time

## 2021-09-09 NOTE — ASSESSMENT
[FreeTextEntry1] : Initially with organ confined bladder cancer but concern for high risk (T3) with L hydro and atrophy. PET avid new node. Unclear if ammenable to bx. Unable to have MRI. Now POD#3 from TURBT for path. Now discomfort more abd. Suspect clot within bladder vs irritation post surgery. \par --Tylenol prn\par --bowel regimen\par --Encourage fluid intake\par --CBC, BMP today\par --CT if sx worsen

## 2021-09-11 ENCOUNTER — OUTPATIENT (OUTPATIENT)
Dept: OUTPATIENT SERVICES | Facility: HOSPITAL | Age: 56
LOS: 1 days | Discharge: HOME | End: 2021-09-11
Payer: MEDICARE

## 2021-09-11 DIAGNOSIS — Z98.89 OTHER SPECIFIED POSTPROCEDURAL STATES: Chronic | ICD-10-CM

## 2021-09-11 DIAGNOSIS — Z87.448 PERSONAL HISTORY OF OTHER DISEASES OF URINARY SYSTEM: Chronic | ICD-10-CM

## 2021-09-11 DIAGNOSIS — Z95.0 PRESENCE OF CARDIAC PACEMAKER: Chronic | ICD-10-CM

## 2021-09-11 DIAGNOSIS — D64.9 ANEMIA, UNSPECIFIED: ICD-10-CM

## 2021-09-13 RX ORDER — IRON POLYSACCHARIDE COMPLEX 150 MG
150 CAPSULE ORAL
Qty: 60 | Refills: 0 | Status: DISCONTINUED | COMMUNITY
Start: 2018-05-22 | End: 2021-09-13

## 2021-09-13 RX ORDER — ALBUTEROL SULFATE 90 UG/1
108 (90 BASE) AEROSOL, METERED RESPIRATORY (INHALATION)
Qty: 18 | Refills: 0 | Status: DISCONTINUED | COMMUNITY
Start: 2018-05-22 | End: 2021-09-13

## 2021-09-14 ENCOUNTER — APPOINTMENT (OUTPATIENT)
Dept: HEMATOLOGY ONCOLOGY | Facility: CLINIC | Age: 56
End: 2021-09-14
Payer: MEDICARE

## 2021-09-14 VITALS
HEART RATE: 65 BPM | TEMPERATURE: 97.9 F | OXYGEN SATURATION: 99 % | BODY MASS INDEX: 16.18 KG/M2 | WEIGHT: 94.8 LBS | SYSTOLIC BLOOD PRESSURE: 117 MMHG | HEIGHT: 64.25 IN | DIASTOLIC BLOOD PRESSURE: 73 MMHG | RESPIRATION RATE: 15 BRPM

## 2021-09-14 DIAGNOSIS — R10.30 LOWER ABDOMINAL PAIN, UNSPECIFIED: ICD-10-CM

## 2021-09-14 DIAGNOSIS — J45.909 UNSPECIFIED ASTHMA, UNCOMPLICATED: ICD-10-CM

## 2021-09-14 DIAGNOSIS — Z86.2 PERSONAL HISTORY OF DISEASES OF THE BLOOD AND BLOOD-FORMING ORGANS AND CERTAIN DISORDERS INVOLVING THE IMMUNE MECHANISM: ICD-10-CM

## 2021-09-14 DIAGNOSIS — Z82.0 FAMILY HISTORY OF EPILEPSY AND OTHER DISEASES OF THE NERVOUS SYSTEM: ICD-10-CM

## 2021-09-14 DIAGNOSIS — Z83.3 FAMILY HISTORY OF DIABETES MELLITUS: ICD-10-CM

## 2021-09-14 DIAGNOSIS — Z82.49 FAMILY HISTORY OF ISCHEMIC HEART DISEASE AND OTHER DISEASES OF THE CIRCULATORY SYSTEM: ICD-10-CM

## 2021-09-14 DIAGNOSIS — Z86.79 PERSONAL HISTORY OF OTHER DISEASES OF THE CIRCULATORY SYSTEM: ICD-10-CM

## 2021-09-14 PROCEDURE — 99205 OFFICE O/P NEW HI 60 MIN: CPT

## 2021-09-14 NOTE — REVIEW OF SYSTEMS
[Fatigue] : fatigue [Palpitations] : palpitations [Anxiety] : anxiety [Negative] : Gastrointestinal [Fever] : no fever [Chills] : no chills [Recent Change In Weight] : ~T no recent weight change [Chest Pain] : no chest pain [Lower Ext Edema] : no lower extremity edema [Cough] : no cough [SOB on Exertion] : no shortness of breath during exertion [Dysuria] : no dysuria [Incontinence] : no incontinence [Joint Pain] : no joint pain [Joint Stiffness] : no joint stiffness [Muscle Pain] : no muscle pain [Skin Rash] : no skin rash [Dizziness] : no dizziness [Depression] : no depression [Muscle Weakness] : no muscle weakness [Easy Bleeding] : no tendency for easy bleeding [Easy Bruising] : no tendency for easy bruising [FreeTextEntry8] : hematuria [FreeTextEntry9] : cramps, occ lower back pain, present for 3-4 years, intermittent  [de-identified] : no pruritus [de-identified] : No HA, no paresthesias

## 2021-09-14 NOTE — CONSULT LETTER
[Dear  ___] : Dear  [unfilled], [Consult Letter:] : I had the pleasure of evaluating your patient, [unfilled]. [Please see my note below.] : Please see my note below. [Sincerely,] : Sincerely, [DrLeslee  ___] : Dr. KELLY [DrLeslee ___] : Dr. KELLY

## 2021-09-14 NOTE — PHYSICAL EXAM
[Fully active, able to carry on all pre-disease performance without restriction] : Status 0 - Fully active, able to carry on all pre-disease performance without restriction [Thin] : thin [Normal] : affect appropriate [de-identified] : no edema [de-identified] : KRISTOFER LEYVA

## 2021-09-14 NOTE — REASON FOR VISIT
[Initial Consultation] : an initial consultation [Friend] : friend [FreeTextEntry2] : Bladder cancer

## 2021-09-14 NOTE — ASSESSMENT
[Palliative Care Plan] : not applicable at this time [FreeTextEntry1] : Zoë Bell was seen in consultation today.  She was referred by Dr. Renay Trinidad.  The documents surrounding her care are incomplete.  Notes of her treatment from [facility] are not available.  They were requested by Dr. Trinidad.  I contacted her at the time of the visit, and she said she did receive a packet of information and it was to be scanned into the system, but yet it does not appear as yet.\par \par She has had multiple procedures done and none have revealed muscle invasion from what I have available to me.  Some of her biopsies from other institutions were reviewed at [facility] and once again, there was no clear evidence of muscle invasion.  She had some adenopathy in the periaortic area.  A biopsy was performed and this biopsy was negative.\par \par She says that she was started on cisplatin and gemcitabine.  She received a total of 3 doses.  It appears that she had acute kidney injury after the second cisplatin dose, and no further therapy was given to her.  She says that she was not recommended to receive immunotherapy because of her cardiac issues.  A permanent pacemaker was placed in 2010 for bradycardia.  This was at [place], and she follows with a cardiologist in [place].  She reports no cough or shortness of breath.  She does have occasional fatigue.  Her appetite was altered by recent admission to the hospital in July with a urinary tract infection and percutaneous nephrostomy issues.  She did lose some weight at that time and her appetite is now improved.  She did have some bladder irritation after the biopsy but has no other areas of pain.  She has gross hematuria, which was less than before her recent biopsy.\par \par The biopsy from August 31 revealed noninvasive urothelial carcinoma with both low-grade and high-grade components.  Local invasion was not identified.  Muscularis propria was not identified in specimen 1.  There was no evidence of lymphovascular invasion.  Deep biopsy as specimen #2.  This revealed noninvasive urothelial carcinoma with both low-grade and high-grade components.  Muscularis propria was present but not involved.  Lymphovascular invasion was not noted.\par \par The most recent CT scan within our system was from July.  This revealed severe end-stage chronic left hydroureteronephrosis with marked thinning of the cortical mantle, worsening compared to the prior scan from 2019.  Right nephrostomy catheter was in situ.  There is no hydronephrosis or urolithiasis on the right.  The extensive poorly defined lobulated soft tissue bladder mass was consistent with a history of bladder neoplasm.  There was associated hematoma as well.  The bones appeared normal, and the report says that there was no lymphadenopathy present.  The CT scan was done without IV contrast.\par \par I contacted Dr. Trinidad at the time of this meeting.  She was away on vacation.  She says that she did receive a packet of information from [facility], but this has not been scanned into the system as yet.  I explained to Zoë Ray that I am not able to evaluate her case at this point.  I need to review those documents.  She offered to try to get them again for me, but I explained to her that they were unlikely to send them again since they have already sent them.  We will have to wait until they are scanned into the system.  I will have to meet with her once again to discuss any treatment recommendations.  Her most recent creatinine was normal.\par \par I am sure that there is valuable information within that information from [facility] which will help in determining what the most appropriate therapy for her is.  There must be documentation of muscle invasive somewhere, and his chemotherapy is generally not given to patients without evidence of muscle invasion or metastasis.  All questions were answered to the best of my ability and to their apparent satisfaction.  Much more information is needed in order to assess her appropriately.

## 2021-09-14 NOTE — RESULTS/DATA
[FreeTextEntry1] : EXAM: CT ABDOMEN AND PELVIS\par PROCEDURE DATE: 07/19/2021\par INTERPRETATION: CLINICAL INFORMATION: Left flank pain. Antecedent history of bladder cancer\par COMPARISON: 8/17/2019\par CONTRAST/COMPLICATIONS:\par IV Contrast: NONE\par Oral Contrast: NONE\par Complications: None reported at time of study completion\par PROCEDURE:\par CT of the Abdomen and Pelvis was performed.\par Sagittal and coronal reformats were performed.\par Evaluation solid organ parenchyma and urinary tract significantly limited by lack of IV contrast.\par FINDINGS:\par LOWER CHEST: In situ cardiac pacer. Clear lung bases..\par LIVER: Within normal limits.\par BILE DUCTS: Normal caliber.\par GALLBLADDER: Within normal limits.\par SPLEEN: Within normal limits.\par PANCREAS: Within normal limits.\par ADRENALS: Within normal limits.\par KIDNEYS/URETERS: Severe end-stage chronic left hydroureteronephrosis with marked thinning of the cortical mantle, worsened compared to prior. No obstructing calculus. Right nephrostomy catheter in situ. No hydronephrosis or urolithiasis on the right.\par BLADDER: Extensive poorly defined lobulated soft tissue in the bladder consistent with the history of bladder neoplasm. Associated hematoma difficult to exclude.\par REPRODUCTIVE ORGANS: No gynecologic mass\par \par BOWEL: No bowel obstruction. Appendix no appendicitis\par PERITONEUM: No ascites.\par VESSELS: Nonaneurysmal.\par RETROPERITONEUM/LYMPH NODES: No lymphadenopathy.\par ABDOMINAL WALL: Within normal limits.\par BONES: No acute or aggressive pathology.\par \par IMPRESSION:\par Significantly limited by lack of IV contrast.\par Severe end-stage chronic left hydroureteronephrosis without obstructing calculus presumably related to bladder neoplasm.\par Poorly defined bladder soft tissue density consistent with afforded history of bladder neoplasm.\par \par \par \par --- End of Report ---\par \par \par \par \par \par GERA PIKE MD; Attending Radiologist\par This document has been electronically signed. Jul 19 2021 5:41PM\par ***********************************************\par EXAM: CT ANGIO CHEST (W)AW IC \par \par \par PROCEDURE DATE: 04/17/2020 \par \par \par \par INTERPRETATION: CLINICAL INFORMATION: Shortness of breath, rule out PE. \par History of left urothelial cancer. \par \par COMPARISON: None. \par \par PROCEDURE: \par CT Angiography of the Chest. \par 90 ml of Omnipaque 350 was injected intravenously. 10 ml were discarded. \par Sagittal and coronal reformats were performed as well as 3D (MIP) \par reconstructions. \par \par \par FINDINGS: \par \par LUNGS AND AIRWAYS: Patent central airways. Lungs are clear. \par \par PLEURA: No pleural effusion. \par \par MEDIASTINUM AND FLORENTIN: No lymphadenopathy. \par \par VESSELS: No pulmonary embolism. \par \par HEART: Heart size is normal. Small pericardial effusion. Left chest wall \par AICD. \par \par CHEST WALL AND LOWER NECK: Within normal limits. \par \par VISUALIZED UPPER ABDOMEN: Nodular soft tissue densities in the left upper \par abdomen medial to the kidney may likely reflect prominent lymph nodes, \par largest measures up to 1.1 cm (9, 154). Stable appearance of left \par hydronephrosis partially imaged. \par \par BONES: Within normal limits. \par \par IMPRESSION: \par \par No pulmonary embolism. \par Clear lungs. \par Stable appearance of left hydronephrosis, partially imaged. Enlarged left \par retroperitoneal nodes are new since available prior imaging. Dedicated \par abdominal imaging is recommended given patient's history of left urothelial \par cancer. \par \par \par \par \par \par \par \par \par \par \par \par \par SARAI FRIAS M.D., ATTENDING RADIOLOGIST \par This document has been electronically signed. Apr 17 2020 4:27PM \par *****************************************************\par EXAM: CT ABDOMEN AND PELVIS OC \par \par \par PROCEDURE DATE: 08/17/2019 \par \par \par \par INTERPRETATION: CLINICAL INFORMATION: Malignant neoplasm of the kidney. \par \par COMPARISON: None. \par \par PROCEDURE: \par CT of the Abdomen and Pelvis was performed without intravenous contrast. \par Intravenous contrast: None. \par Oral contrast: None. \par Sagittal and coronal reformats were performed. \par \par FINDINGS: \par \par LOWER CHEST: There are no pleural or pericardial effusions. The heart size \par is normal. There are pacemaker leads in the right ventricle. The imaged \par lungs are clear. \par \par LIVER: Within normal limits. \par BILE DUCTS: Normal caliber. \par GALLBLADDER: Within normal limits. \par SPLEEN: Within normal limits. \par PANCREAS: Within normal limits. \par ADRENALS: Within normal limits. \par KIDNEYS/URETERS: The unenhanced right kidney is unremarkable without \par hydronephrosis. The left kidney is enlarged and demonstrates mild to \par moderate degree of hydroureteronephrosis. No definite obstructing calculus \par is identified along the urinary tract. Note however that the distal left \par ureter is difficult to follow due to adjacent unopacified small bowel and \par uterus. No definite focal masses are identified on this noncontrast exam in \par this patient known to have malignant neoplasm of the kidney. \par \par BLADDER: Within normal limits. \par REPRODUCTIVE ORGANS: Bulky appearance of the uterus which may reflect \par leiomyomatous change. This may reflect leiomyomatous change. \par \par BOWEL: No bowel obstruction. Appendix is within normal limits. \par PERITONEUM: No ascites. \par VESSELS: The aorta and IVC are normal in caliber and patent. \par RETROPERITONEUM/LYMPH NODES: No lymphadenopathy. \par ABDOMINAL WALL: Within normal limits. \par BONES: Within normal limits. \par \par IMPRESSION: \par \par Mild-to-moderate LEFT hydroureteronephrosis without a definite identifiable \par obstructing stone or lesion on this noncontrast exam. \par Note that lack of intravenous contrast limits assessment of masses in the \par urinary system. \par \par \par \par \par \par \par \par \par \par \par YAYA REID M.D. ATTENDING RADIOLOGIST \par This document has been electronically signed. Aug 17 2019 10:51AM \par ****************************************************************************\par A CT scan of the abdomen and pelvis was performed without contrast on May 30 of 2021 at an outside radiology facility.  This was compared to a study done just 2 days prior.  Right percutaneous nephrostomy was noted.  There was no right hydronephrosis or hydroureter.  There was cortical thinning of the left kidney with severe hydronephrosis.  There was mild to moderate left hydroureter with wall thickening.  There is diffuse marked wall thickening of the bladder.  It states that lymph nodes were not enlarged by CT size criteria.\par She had a PET CT scan performed at [facility] on April 23 of 2021.  This revealed a large lobulated bladder tumor involving the anterior posterior lower walls, sparing the anterior and anterolateral walls, demonstrating intense metabolic activity.  There was stable to improved metabolic activity of subcentimeter left para-aortic lymph nodes.  This was compared to several previous PET examinations including November 2020, July 2020, in September 2019.  It says there was stable activity in the subcentimeter left para-aortic lymph nodes, 1 measuring 1 x 0.6 cm, unchanged in size but slightly less SUV.  An additional subcentimeter left periaortic lymph node was mildly improved measuring 0.8 x 0.7, previously 1 x 0.8 with an SUV of 1.2 compared to prior at 2.1.\par CT scan dated March 29 from [facility] revealed unchanged left para-aortic lymph nodes measuring up to 1 x 0.7 cm.  There was new mild circumferential wall thickening of the bladder with new perivesical stranding.  There was an increase in multiple bladder masses, and for example there was one measuring 4.6 x 3 cm in the posterior lateral bladder wall, previously 3.5 x 2.1 cm.  There was increased circumferential wall thickening of the left ureter, possibly infectious/inflammatory or possibly urothelial neoplasm or combination of both.\par A CT scan dated January 12 for [facility] revealed decreased para-aortic lymph node, measuring 1.2 x 0.7, previously 1.7 x 0.9 cm.\par A CT scan from [facility] on September 22 of 2020 revealed new and increased multiple enhancing lesions in the left renal collecting system and throughout the left ureter.  New retroperitoneal adenopathy was present with left periaortic adenopathy measuring 1.7 x 1.1, previously 0.9 x 0.8.  In the bladder, there was increase in size of multiple confluent polypoid intraluminal masses, predominantly along the posterior bladder wall.  A representative mass along the right bladder wall measured 4 x 3.4 cm, previously 2.2 x 2 cm.\par \par An April 15 interpretation of outside films from March 13 of 2020 revealed left hydronephrosis once again noted with a delayed left nephrogram.  A left ureteral nodule measured 1.8 cm in size.  Multiple bladder nodules are again noted better delineated than on a prior noncontrast scan, measuring up to 2.2 x 2 cm.\par \par In August 17, 2019 CT scan with in our system revealed a normal-appearing bladder.  There was mild to moderate left hydroureteronephrosis without a definitive identifiable obstructing stone or lesion on this noncontrast examination.\par \par A CT scan from July 1 from [facility] indicated normal urogram.  Delayed views revealed a calyceal irregularity related to the upper pole calyx in the left kidney.  There appears to be a mucosal irregularity and narrowing.\par \par A noncontrast CT scan was performed at an outside radiology center in February 2018 due to back pain radiating to the groin.  The states that the patient had symptoms of renal colic.  No hydronephrosis or stone was noted.

## 2021-09-14 NOTE — HISTORY OF PRESENT ILLNESS
[de-identified] : Zoë Bell presents for consultation on September 14, 2021.  She is referred by Dr. Renay Trinidad. \par \par On October 8 of 2020, she underwent a retroperitoneal core biopsy of the lymph node which revealed lymphoid tissue, negative for metastatic carcinoma.  This was at [facility].\par The pathology from September 2021 revealed noninvasive urothelial carcinoma, low-grade with high-grade components.  Muscularis propria was not identified in the specimen.  There was no lymphovascular invasion.  A deep biopsy revealed noninvasive urothelial carcinoma, both low-grade and high-grade components.  No lymphovascular invasion was noted.\par A consultative report from [facility] dated May 8 of 2020 was a review of slides from [facility].  This revealed noninvasive urothelial carcinoma with focal squamous features.  It was called high-grade papillary.  No local invasion was noted.  Muscle was not present.  A biopsy from the left mid ureter did not reveal any evidence of carcinoma was a very small biopsy.  That report only contains 1 page, and there is a bladder tumor biopsy #3 which was read as invasive urothelial carcinoma with focal squamous features.  This was a high-grade papillary tumor.  It invaded lamina propria focally.  Muscularis propria was not identified.\par Another pathology review at [facility] was from [facility].  This was a renal pelvis biopsy from the left side.  This was read as atypical urothelial proliferation, indefinite for carcinoma.\par A discharge summary for [facility] office some additional information.  She was admitted on January 30 of this year discharged on February 4.  It says that on February 27 of 2020, the patient underwent a transurethral resection of a bladder tumor and left ureteroscopy/left ureteral stent placement with pathology showing high-grade urothelial cell carcinoma.  The patient presented at [facility] on April 13 of 2020 for evaluation.  She was initially followed by active surveillance.  On July 15 of 2020, a PET/CT showed interval development of posterior bladder wall irregularity, consistent with the biopsied bladder carcinoma.  Focal intense activity was noted at the left mid ureter associated with new poor left kidney excretion and increased calyectasis and increasing dilatation of the left renal pelvis.  There were new mildly active small left para-aortic lymph nodes adjacent to the proximal ureter.  The patient underwent urine cytology which was positive for urothelial carcinoma.  On September 22, 2020, a CT urogram showed increasing size of multiple bladder masses with new and increased multiple enhancing lesions in the left renal collecting system and throughout the left ureter consistent with urothelial neoplasms.  A lymph node core biopsy was performed on October 8 of 2020, revealing lymphoid tissue which was negative for metastatic carcinoma.  She was treated with cisplatin and gemcitabine starting on December 18 of 2020.  She was admitted on January 13 of 2021 for the management of right-sided abdominal and back pain with acute kidney injury with a diagnosis of pyelonephritis and a likely prerenal component.  The patient presented on January 29 of 2021 with a complaint of gross hematuria.  Urine showed Klebsiella.  Sonogram revealed increased mild right hydronephrosis.  The patient was treated with ceftriaxone.  Her nephrostomy tube was replaced and a right-sided nephrostomy tube was placed.  The note indicates that she was followed by Dr. Pallavi Hernandez.  Her creatinine had increased to 2.3 from a baseline of 1.1-1.2, and the creatinine decreased to 1.7 prior to discharge. No further chemo was given after this.  her PCN came out after an accident in June, and the PCN was replaced. She had another PCN change in July at [facility]. She has not been back for [facility] since that time. She apparently only received 3 cycles of treatment. Last seen at [facility] \par \par Overall, feels well, was doing well, gaining weight. Then she lost weight while admitted in July, had UTI and PCN issues. Appetite now is good. Says she lost about 4 pounds. No pains other than some bladder irritation post biopsy. Has gross hematuria, less than before the biopsy. No dysuria. NO incontinence. NO urgency. Nocturia x 1. No edema, no chest pain/pressure. Occ palpitations, has PPM since 2010 for bradycardia. Placed at [facility]. Follows with cardiology  with Kody Anthony in [place]. No cough, no DUQUE. Occ fatigue.

## 2021-09-18 ENCOUNTER — APPOINTMENT (OUTPATIENT)
Dept: DISASTER EMERGENCY | Facility: CLINIC | Age: 56
End: 2021-09-18

## 2021-09-21 DIAGNOSIS — N99.528 OTHER COMPLICATION OF INCONTINENT EXTERNAL STOMA OF URINARY TRACT: ICD-10-CM

## 2021-09-27 ENCOUNTER — APPOINTMENT (OUTPATIENT)
Dept: HEMATOLOGY ONCOLOGY | Facility: CLINIC | Age: 56
End: 2021-09-27

## 2021-10-02 ENCOUNTER — APPOINTMENT (OUTPATIENT)
Dept: DISASTER EMERGENCY | Facility: CLINIC | Age: 56
End: 2021-10-02

## 2021-10-02 DIAGNOSIS — Z01.818 ENCOUNTER FOR OTHER PREPROCEDURAL EXAMINATION: ICD-10-CM

## 2021-10-04 ENCOUNTER — OUTPATIENT (OUTPATIENT)
Dept: OUTPATIENT SERVICES | Facility: HOSPITAL | Age: 56
LOS: 1 days | End: 2021-10-04
Payer: MEDICARE

## 2021-10-04 ENCOUNTER — RESULT REVIEW (OUTPATIENT)
Age: 56
End: 2021-10-04

## 2021-10-04 ENCOUNTER — APPOINTMENT (OUTPATIENT)
Dept: INTERVENTIONAL RADIOLOGY/VASCULAR | Facility: HOSPITAL | Age: 56
End: 2021-10-04

## 2021-10-04 VITALS
TEMPERATURE: 98 F | RESPIRATION RATE: 15 BRPM | DIASTOLIC BLOOD PRESSURE: 80 MMHG | OXYGEN SATURATION: 98 % | HEART RATE: 53 BPM | SYSTOLIC BLOOD PRESSURE: 152 MMHG

## 2021-10-04 VITALS
OXYGEN SATURATION: 100 % | HEART RATE: 54 BPM | RESPIRATION RATE: 18 BRPM | TEMPERATURE: 98 F | SYSTOLIC BLOOD PRESSURE: 133 MMHG | DIASTOLIC BLOOD PRESSURE: 64 MMHG

## 2021-10-04 DIAGNOSIS — Z87.448 PERSONAL HISTORY OF OTHER DISEASES OF URINARY SYSTEM: Chronic | ICD-10-CM

## 2021-10-04 DIAGNOSIS — Z95.0 PRESENCE OF CARDIAC PACEMAKER: Chronic | ICD-10-CM

## 2021-10-04 DIAGNOSIS — N13.30 UNSPECIFIED HYDRONEPHROSIS: ICD-10-CM

## 2021-10-04 DIAGNOSIS — Z98.89 OTHER SPECIFIED POSTPROCEDURAL STATES: Chronic | ICD-10-CM

## 2021-10-04 LAB — SARS-COV-2 N GENE NPH QL NAA+PROBE: NOT DETECTED

## 2021-10-04 PROCEDURE — 50435 EXCHANGE NEPHROSTOMY CATH: CPT | Mod: RT

## 2021-10-04 PROCEDURE — 50435 EXCHANGE NEPHROSTOMY CATH: CPT

## 2021-10-04 PROCEDURE — 76000 FLUOROSCOPY <1 HR PHYS/QHP: CPT

## 2021-10-04 RX ORDER — ONDANSETRON 8 MG/1
4 TABLET, FILM COATED ORAL ONCE
Refills: 0 | Status: DISCONTINUED | OUTPATIENT
Start: 2021-10-04 | End: 2021-10-04

## 2021-10-04 RX ORDER — ACETAMINOPHEN 500 MG
650 TABLET ORAL ONCE
Refills: 0 | Status: COMPLETED | OUTPATIENT
Start: 2021-10-04 | End: 2021-10-04

## 2021-10-04 RX ORDER — ERGOCALCIFEROL 1.25 MG/1
1 CAPSULE ORAL
Qty: 0 | Refills: 0 | DISCHARGE

## 2021-10-04 RX ORDER — FENTANYL CITRATE 50 UG/ML
25 INJECTION INTRAVENOUS
Refills: 0 | Status: DISCONTINUED | OUTPATIENT
Start: 2021-10-04 | End: 2021-10-04

## 2021-10-04 RX ORDER — FENTANYL CITRATE 50 UG/ML
50 INJECTION INTRAVENOUS
Refills: 0 | Status: DISCONTINUED | OUTPATIENT
Start: 2021-10-04 | End: 2021-10-04

## 2021-10-04 RX ADMIN — Medication 260 MILLIGRAM(S): at 12:31

## 2021-10-04 RX ADMIN — Medication 650 MILLIGRAM(S): at 12:16

## 2021-10-04 NOTE — PROCEDURE NOTE - PROCEDURE FINDINGS AND DETAILS
Right sided 8 Fr nephrostomy tube exchanged and upsized to a 10 fr catheter.  Patient tolerated well.

## 2021-10-08 NOTE — HISTORY OF PRESENT ILLNESS
[FreeTextEntry1] : 55yo female with cc of TCC. Pt was seen by Dr Davidson back in 8/2019. At that time she was noted to have mulriple comorbidities. She had hematuria and underwent eval with CT revealing a probable UP transitional cell ca based on L URS and abnormal cytology that localized to L but bx was non-contributory. Plan was made for probable L nephroU. Seems pt was lost to follow-up. She reports she did not have uppertract disease and only had blockage managed with stent. Unclear but seems she developed metastatic disease and underwent chemo and reports she only did 3 cycles of [place]/Cis instead of 12. Only records that pt brought for review was the non diagnostic path from 2019 and a recent PET CT. Also has a bladder bx report from 5/2020 that showed HG Ta TCC with focal squamous features. Her most recent PET shows a large bladder mass  and unclear uppertract picture. She was referred to Dr Alcocer but cancelled the appointment. She reports PCN was placed in R kidney during an infection. Has been to dependent drainage. Left side has not been instrumented that I can tell, unclear drainage/function. Plan made to obtain outside records and follow-up. \par \par Since last visit, she had chest pain and went to [facility]. She was dx with pericardial effusion. Has had follow-up echo per report. WHile in ER, dislodged her PCN and had it replaced (5/28). \par \par Review of records shows HG Ta TCC as above and bladder with HG T1. Scans showed nodularity on L concerning for extension outside bladder. She had PET with PET avid LAD but Had subsequent bx that was negative. Plan made for neoadjuvant chemo (GC) followed by L nephro-U and cystectomy. She had 2 cycles and was admitted to hospital in Jan for CRYSTAL and R flank pain. Had new R hydro. CRYSTAL improved and now at recent ER visit was back to 1.0. She reports PET in April at [facility] but no records. 
COVID-19

## 2021-11-04 ENCOUNTER — NON-APPOINTMENT (OUTPATIENT)
Age: 56
End: 2021-11-04

## 2021-11-04 NOTE — ED ADULT NURSE NOTE - SUICIDE SCREENING DEPRESSION
Negative 2 - Slight disability. Able to lookafter own affairs without assistance, but unable to carry out all previous activities

## 2021-11-17 ENCOUNTER — APPOINTMENT (OUTPATIENT)
Dept: HEMATOLOGY ONCOLOGY | Facility: CLINIC | Age: 56
End: 2021-11-17

## 2021-12-09 ENCOUNTER — NON-APPOINTMENT (OUTPATIENT)
Age: 56
End: 2021-12-09

## 2021-12-28 ENCOUNTER — NON-APPOINTMENT (OUTPATIENT)
Age: 56
End: 2021-12-28

## 2022-01-01 ENCOUNTER — APPOINTMENT (OUTPATIENT)
Dept: UROLOGY | Facility: CLINIC | Age: 57
End: 2022-01-01
Payer: MEDICARE

## 2022-01-01 ENCOUNTER — NON-APPOINTMENT (OUTPATIENT)
Age: 57
End: 2022-01-01

## 2022-01-01 ENCOUNTER — TRANSCRIPTION ENCOUNTER (OUTPATIENT)
Age: 57
End: 2022-01-01

## 2022-01-01 ENCOUNTER — APPOINTMENT (OUTPATIENT)
Dept: INTERVENTIONAL RADIOLOGY/VASCULAR | Facility: HOSPITAL | Age: 57
End: 2022-01-01

## 2022-01-01 ENCOUNTER — RESULT REVIEW (OUTPATIENT)
Age: 57
End: 2022-01-01

## 2022-01-01 ENCOUNTER — INPATIENT (INPATIENT)
Facility: HOSPITAL | Age: 57
LOS: 1 days | Discharge: HOME | DRG: 313 | End: 2022-05-06
Attending: GENERAL PRACTICE | Admitting: GENERAL PRACTICE
Payer: MEDICARE

## 2022-01-01 ENCOUNTER — INPATIENT (INPATIENT)
Facility: HOSPITAL | Age: 57
LOS: 3 days | Discharge: HOME | DRG: 690 | End: 2022-06-13
Attending: GENERAL PRACTICE | Admitting: GENERAL PRACTICE
Payer: MEDICARE

## 2022-01-01 ENCOUNTER — APPOINTMENT (OUTPATIENT)
Dept: CT IMAGING | Facility: IMAGING CENTER | Age: 57
End: 2022-01-01

## 2022-01-01 ENCOUNTER — OUTPATIENT (OUTPATIENT)
Dept: OUTPATIENT SERVICES | Facility: HOSPITAL | Age: 57
LOS: 1 days | End: 2022-01-01
Payer: MEDICARE

## 2022-01-01 ENCOUNTER — INPATIENT (INPATIENT)
Facility: HOSPITAL | Age: 57
LOS: 13 days | Discharge: HOME | DRG: 698 | End: 2022-08-29
Attending: GENERAL PRACTICE | Admitting: GENERAL PRACTICE
Payer: MEDICARE

## 2022-01-01 ENCOUNTER — OUTPATIENT (OUTPATIENT)
Dept: OUTPATIENT SERVICES | Facility: HOSPITAL | Age: 57
LOS: 1 days | Discharge: HOME | End: 2022-01-01

## 2022-01-01 ENCOUNTER — INPATIENT (INPATIENT)
Facility: HOSPITAL | Age: 57
LOS: 0 days | Discharge: HOME | DRG: 259 | End: 2022-02-09
Attending: INTERNAL MEDICINE | Admitting: INTERNAL MEDICINE
Payer: COMMERCIAL

## 2022-01-01 ENCOUNTER — EMERGENCY (EMERGENCY)
Facility: HOSPITAL | Age: 57
LOS: 1 days | Discharge: HOME | End: 2022-01-01
Attending: STUDENT IN AN ORGANIZED HEALTH CARE EDUCATION/TRAINING PROGRAM
Payer: MEDICARE

## 2022-01-01 ENCOUNTER — APPOINTMENT (OUTPATIENT)
Dept: ULTRASOUND IMAGING | Facility: IMAGING CENTER | Age: 57
End: 2022-01-01

## 2022-01-01 ENCOUNTER — APPOINTMENT (OUTPATIENT)
Dept: GERIATRICS | Facility: CLINIC | Age: 57
End: 2022-01-01

## 2022-01-01 ENCOUNTER — EMERGENCY (EMERGENCY)
Facility: HOSPITAL | Age: 57
LOS: 1 days | Discharge: HOME | End: 2022-01-01
Attending: EMERGENCY MEDICINE
Payer: MEDICARE

## 2022-01-01 ENCOUNTER — INPATIENT (INPATIENT)
Facility: HOSPITAL | Age: 57
LOS: 24 days | Discharge: HOME | DRG: 698 | End: 2022-10-14
Attending: GENERAL PRACTICE | Admitting: GENERAL PRACTICE
Payer: MEDICARE

## 2022-01-01 ENCOUNTER — INPATIENT (INPATIENT)
Facility: HOSPITAL | Age: 57
LOS: 28 days | Discharge: HOME HEALTH | End: 2022-11-15
Attending: GENERAL PRACTICE | Admitting: GENERAL PRACTICE

## 2022-01-01 ENCOUNTER — APPOINTMENT (OUTPATIENT)
Dept: UROLOGY | Facility: CLINIC | Age: 57
End: 2022-01-01

## 2022-01-01 ENCOUNTER — INPATIENT (INPATIENT)
Facility: HOSPITAL | Age: 57
LOS: 2 days | Discharge: HOME | DRG: 699 | End: 2022-08-03
Attending: GENERAL PRACTICE | Admitting: GENERAL PRACTICE
Payer: MEDICARE

## 2022-01-01 ENCOUNTER — INPATIENT (INPATIENT)
Facility: HOSPITAL | Age: 57
LOS: 9 days | Discharge: HOME | DRG: 689 | End: 2022-07-07
Attending: INTERNAL MEDICINE | Admitting: INTERNAL MEDICINE
Payer: MEDICARE

## 2022-01-01 ENCOUNTER — INPATIENT (INPATIENT)
Facility: HOSPITAL | Age: 57
LOS: 19 days | Discharge: HOME | DRG: 180 | End: 2022-12-21
Attending: GENERAL PRACTICE | Admitting: GENERAL PRACTICE
Payer: MEDICARE

## 2022-01-01 ENCOUNTER — APPOINTMENT (OUTPATIENT)
Dept: HEMATOLOGY ONCOLOGY | Facility: CLINIC | Age: 57
End: 2022-01-01

## 2022-01-01 ENCOUNTER — INPATIENT (INPATIENT)
Facility: HOSPITAL | Age: 57
LOS: 11 days | Discharge: HOME | DRG: 643 | End: 2022-03-14
Attending: GENERAL PRACTICE | Admitting: GENERAL PRACTICE
Payer: MEDICARE

## 2022-01-01 VITALS
WEIGHT: 80.91 LBS | TEMPERATURE: 98 F | HEART RATE: 107 BPM | SYSTOLIC BLOOD PRESSURE: 111 MMHG | HEIGHT: 64 IN | DIASTOLIC BLOOD PRESSURE: 61 MMHG | OXYGEN SATURATION: 99 % | RESPIRATION RATE: 19 BRPM

## 2022-01-01 VITALS
HEART RATE: 98 BPM | BODY MASS INDEX: 13.25 KG/M2 | WEIGHT: 77.82 LBS | SYSTOLIC BLOOD PRESSURE: 97 MMHG | DIASTOLIC BLOOD PRESSURE: 63 MMHG | TEMPERATURE: 97.3 F | OXYGEN SATURATION: 99 % | RESPIRATION RATE: 16 BRPM

## 2022-01-01 VITALS
RESPIRATION RATE: 16 BRPM | OXYGEN SATURATION: 100 % | WEIGHT: 101.41 LBS | HEIGHT: 64 IN | DIASTOLIC BLOOD PRESSURE: 78 MMHG | SYSTOLIC BLOOD PRESSURE: 131 MMHG | HEART RATE: 75 BPM | TEMPERATURE: 98 F

## 2022-01-01 VITALS
RESPIRATION RATE: 16 BRPM | OXYGEN SATURATION: 100 % | TEMPERATURE: 98 F | SYSTOLIC BLOOD PRESSURE: 133 MMHG | HEART RATE: 85 BPM | HEIGHT: 64 IN | DIASTOLIC BLOOD PRESSURE: 83 MMHG

## 2022-01-01 VITALS
SYSTOLIC BLOOD PRESSURE: 102 MMHG | DIASTOLIC BLOOD PRESSURE: 49 MMHG | RESPIRATION RATE: 16 BRPM | OXYGEN SATURATION: 100 % | HEART RATE: 74 BPM | TEMPERATURE: 97 F

## 2022-01-01 VITALS
SYSTOLIC BLOOD PRESSURE: 109 MMHG | DIASTOLIC BLOOD PRESSURE: 58 MMHG | TEMPERATURE: 98 F | HEART RATE: 68 BPM | RESPIRATION RATE: 16 BRPM | OXYGEN SATURATION: 98 %

## 2022-01-01 VITALS
HEART RATE: 75 BPM | RESPIRATION RATE: 17 BRPM | TEMPERATURE: 99 F | SYSTOLIC BLOOD PRESSURE: 136 MMHG | OXYGEN SATURATION: 100 % | DIASTOLIC BLOOD PRESSURE: 86 MMHG

## 2022-01-01 VITALS
HEART RATE: 63 BPM | TEMPERATURE: 98 F | SYSTOLIC BLOOD PRESSURE: 116 MMHG | RESPIRATION RATE: 18 BRPM | OXYGEN SATURATION: 100 % | DIASTOLIC BLOOD PRESSURE: 65 MMHG

## 2022-01-01 VITALS
DIASTOLIC BLOOD PRESSURE: 75 MMHG | WEIGHT: 87.08 LBS | OXYGEN SATURATION: 98 % | HEIGHT: 64 IN | TEMPERATURE: 98 F | SYSTOLIC BLOOD PRESSURE: 128 MMHG | HEART RATE: 76 BPM | RESPIRATION RATE: 16 BRPM

## 2022-01-01 VITALS
HEIGHT: 64 IN | DIASTOLIC BLOOD PRESSURE: 72 MMHG | SYSTOLIC BLOOD PRESSURE: 139 MMHG | HEART RATE: 73 BPM | OXYGEN SATURATION: 100 % | TEMPERATURE: 97 F | WEIGHT: 96.12 LBS | RESPIRATION RATE: 16 BRPM

## 2022-01-01 VITALS
SYSTOLIC BLOOD PRESSURE: 119 MMHG | WEIGHT: 85.98 LBS | HEART RATE: 84 BPM | OXYGEN SATURATION: 98 % | TEMPERATURE: 98 F | DIASTOLIC BLOOD PRESSURE: 65 MMHG | RESPIRATION RATE: 17 BRPM | HEIGHT: 64 IN

## 2022-01-01 VITALS
RESPIRATION RATE: 16 BRPM | TEMPERATURE: 97 F | HEART RATE: 77 BPM | DIASTOLIC BLOOD PRESSURE: 52 MMHG | SYSTOLIC BLOOD PRESSURE: 113 MMHG | DIASTOLIC BLOOD PRESSURE: 62 MMHG | HEART RATE: 82 BPM | RESPIRATION RATE: 18 BRPM | SYSTOLIC BLOOD PRESSURE: 106 MMHG | TEMPERATURE: 98 F | OXYGEN SATURATION: 100 % | OXYGEN SATURATION: 99 %

## 2022-01-01 VITALS
WEIGHT: 85.1 LBS | TEMPERATURE: 98 F | SYSTOLIC BLOOD PRESSURE: 122 MMHG | HEART RATE: 85 BPM | DIASTOLIC BLOOD PRESSURE: 75 MMHG | OXYGEN SATURATION: 98 % | RESPIRATION RATE: 17 BRPM | HEIGHT: 64 IN

## 2022-01-01 VITALS
TEMPERATURE: 99 F | SYSTOLIC BLOOD PRESSURE: 98 MMHG | HEART RATE: 91 BPM | OXYGEN SATURATION: 99 % | RESPIRATION RATE: 16 BRPM | DIASTOLIC BLOOD PRESSURE: 60 MMHG

## 2022-01-01 VITALS
HEART RATE: 76 BPM | SYSTOLIC BLOOD PRESSURE: 110 MMHG | WEIGHT: 90.39 LBS | TEMPERATURE: 99 F | HEIGHT: 64 IN | RESPIRATION RATE: 19 BRPM | DIASTOLIC BLOOD PRESSURE: 69 MMHG | OXYGEN SATURATION: 98 %

## 2022-01-01 VITALS
SYSTOLIC BLOOD PRESSURE: 154 MMHG | HEART RATE: 68 BPM | TEMPERATURE: 98 F | RESPIRATION RATE: 20 BRPM | HEIGHT: 64 IN | DIASTOLIC BLOOD PRESSURE: 90 MMHG | WEIGHT: 91.05 LBS | OXYGEN SATURATION: 100 %

## 2022-01-01 VITALS
TEMPERATURE: 98 F | OXYGEN SATURATION: 100 % | DIASTOLIC BLOOD PRESSURE: 80 MMHG | SYSTOLIC BLOOD PRESSURE: 125 MMHG | RESPIRATION RATE: 18 BRPM

## 2022-01-01 VITALS
HEIGHT: 64 IN | TEMPERATURE: 98 F | RESPIRATION RATE: 17 BRPM | WEIGHT: 91.93 LBS | DIASTOLIC BLOOD PRESSURE: 67 MMHG | OXYGEN SATURATION: 97 % | HEART RATE: 85 BPM | SYSTOLIC BLOOD PRESSURE: 118 MMHG

## 2022-01-01 VITALS
SYSTOLIC BLOOD PRESSURE: 131 MMHG | RESPIRATION RATE: 16 BRPM | HEART RATE: 61 BPM | TEMPERATURE: 98 F | DIASTOLIC BLOOD PRESSURE: 94 MMHG | OXYGEN SATURATION: 100 %

## 2022-01-01 VITALS
RESPIRATION RATE: 17 BRPM | OXYGEN SATURATION: 99 % | DIASTOLIC BLOOD PRESSURE: 64 MMHG | HEART RATE: 76 BPM | SYSTOLIC BLOOD PRESSURE: 108 MMHG

## 2022-01-01 VITALS
TEMPERATURE: 98 F | WEIGHT: 87.96 LBS | DIASTOLIC BLOOD PRESSURE: 80 MMHG | HEIGHT: 64 IN | SYSTOLIC BLOOD PRESSURE: 145 MMHG | RESPIRATION RATE: 18 BRPM | HEART RATE: 76 BPM

## 2022-01-01 VITALS
HEART RATE: 58 BPM | WEIGHT: 92.59 LBS | SYSTOLIC BLOOD PRESSURE: 156 MMHG | RESPIRATION RATE: 18 BRPM | OXYGEN SATURATION: 100 % | TEMPERATURE: 97 F | HEIGHT: 64 IN | DIASTOLIC BLOOD PRESSURE: 84 MMHG

## 2022-01-01 VITALS
DIASTOLIC BLOOD PRESSURE: 78 MMHG | WEIGHT: 84.44 LBS | RESPIRATION RATE: 16 BRPM | SYSTOLIC BLOOD PRESSURE: 119 MMHG | OXYGEN SATURATION: 98 % | TEMPERATURE: 98 F | HEIGHT: 64 IN | HEART RATE: 70 BPM

## 2022-01-01 VITALS — OXYGEN SATURATION: 97 % | RESPIRATION RATE: 18 BRPM

## 2022-01-01 VITALS
RESPIRATION RATE: 16 BRPM | DIASTOLIC BLOOD PRESSURE: 69 MMHG | OXYGEN SATURATION: 99 % | TEMPERATURE: 98 F | HEART RATE: 61 BPM | SYSTOLIC BLOOD PRESSURE: 110 MMHG

## 2022-01-01 VITALS
TEMPERATURE: 98 F | DIASTOLIC BLOOD PRESSURE: 65 MMHG | OXYGEN SATURATION: 94 % | RESPIRATION RATE: 18 BRPM | SYSTOLIC BLOOD PRESSURE: 100 MMHG | HEART RATE: 65 BPM

## 2022-01-01 VITALS — DIASTOLIC BLOOD PRESSURE: 66 MMHG | SYSTOLIC BLOOD PRESSURE: 108 MMHG | HEART RATE: 66 BPM

## 2022-01-01 VITALS
OXYGEN SATURATION: 97 % | DIASTOLIC BLOOD PRESSURE: 73 MMHG | HEART RATE: 56 BPM | RESPIRATION RATE: 17 BRPM | TEMPERATURE: 98 F | SYSTOLIC BLOOD PRESSURE: 130 MMHG

## 2022-01-01 VITALS
OXYGEN SATURATION: 100 % | RESPIRATION RATE: 18 BRPM | DIASTOLIC BLOOD PRESSURE: 61 MMHG | TEMPERATURE: 98 F | SYSTOLIC BLOOD PRESSURE: 115 MMHG | HEART RATE: 62 BPM

## 2022-01-01 VITALS
DIASTOLIC BLOOD PRESSURE: 63 MMHG | WEIGHT: 74.08 LBS | RESPIRATION RATE: 18 BRPM | HEART RATE: 86 BPM | SYSTOLIC BLOOD PRESSURE: 114 MMHG | OXYGEN SATURATION: 98 % | HEIGHT: 64 IN

## 2022-01-01 VITALS
SYSTOLIC BLOOD PRESSURE: 114 MMHG | DIASTOLIC BLOOD PRESSURE: 51 MMHG | TEMPERATURE: 98 F | HEART RATE: 69 BPM | RESPIRATION RATE: 16 BRPM | OXYGEN SATURATION: 100 %

## 2022-01-01 DIAGNOSIS — Z02.9 ENCOUNTER FOR ADMINISTRATIVE EXAMINATIONS, UNSPECIFIED: ICD-10-CM

## 2022-01-01 DIAGNOSIS — I10 ESSENTIAL (PRIMARY) HYPERTENSION: ICD-10-CM

## 2022-01-01 DIAGNOSIS — R10.9 UNSPECIFIED ABDOMINAL PAIN: ICD-10-CM

## 2022-01-01 DIAGNOSIS — Z93.6 OTHER ARTIFICIAL OPENINGS OF URINARY TRACT STATUS: ICD-10-CM

## 2022-01-01 DIAGNOSIS — Z95.0 PRESENCE OF CARDIAC PACEMAKER: Chronic | ICD-10-CM

## 2022-01-01 DIAGNOSIS — R06.00 DYSPNEA, UNSPECIFIED: ICD-10-CM

## 2022-01-01 DIAGNOSIS — N95.0 POSTMENOPAUSAL BLEEDING: ICD-10-CM

## 2022-01-01 DIAGNOSIS — N93.9 ABNORMAL UTERINE AND VAGINAL BLEEDING, UNSPECIFIED: ICD-10-CM

## 2022-01-01 DIAGNOSIS — E78.5 HYPERLIPIDEMIA, UNSPECIFIED: ICD-10-CM

## 2022-01-01 DIAGNOSIS — M54.9 DORSALGIA, UNSPECIFIED: ICD-10-CM

## 2022-01-01 DIAGNOSIS — N39.0 URINARY TRACT INFECTION, SITE NOT SPECIFIED: ICD-10-CM

## 2022-01-01 DIAGNOSIS — Z87.448 PERSONAL HISTORY OF OTHER DISEASES OF URINARY SYSTEM: Chronic | ICD-10-CM

## 2022-01-01 DIAGNOSIS — Z91.89 OTHER SPECIFIED PERSONAL RISK FACTORS, NOT ELSEWHERE CLASSIFIED: ICD-10-CM

## 2022-01-01 DIAGNOSIS — E43 UNSPECIFIED SEVERE PROTEIN-CALORIE MALNUTRITION: ICD-10-CM

## 2022-01-01 DIAGNOSIS — E16.2 HYPOGLYCEMIA, UNSPECIFIED: ICD-10-CM

## 2022-01-01 DIAGNOSIS — C67.9 MALIGNANT NEOPLASM OF BLADDER, UNSPECIFIED: ICD-10-CM

## 2022-01-01 DIAGNOSIS — N13.30 UNSPECIFIED HYDRONEPHROSIS: ICD-10-CM

## 2022-01-01 DIAGNOSIS — D50.9 IRON DEFICIENCY ANEMIA, UNSPECIFIED: ICD-10-CM

## 2022-01-01 DIAGNOSIS — N99.528 OTHER COMPLICATION OF INCONTINENT EXTERNAL STOMA OF URINARY TRACT: ICD-10-CM

## 2022-01-01 DIAGNOSIS — R91.8 OTHER NONSPECIFIC ABNORMAL FINDING OF LUNG FIELD: ICD-10-CM

## 2022-01-01 DIAGNOSIS — Z98.89 OTHER SPECIFIED POSTPROCEDURAL STATES: Chronic | ICD-10-CM

## 2022-01-01 DIAGNOSIS — D64.9 ANEMIA, UNSPECIFIED: ICD-10-CM

## 2022-01-01 DIAGNOSIS — F41.9 ANXIETY DISORDER, UNSPECIFIED: ICD-10-CM

## 2022-01-01 DIAGNOSIS — K59.00 CONSTIPATION, UNSPECIFIED: ICD-10-CM

## 2022-01-01 DIAGNOSIS — R25.2 CRAMP AND SPASM: ICD-10-CM

## 2022-01-01 DIAGNOSIS — R09.89 OTHER SPECIFIED SYMPTOMS AND SIGNS INVOLVING THE CIRCULATORY AND RESPIRATORY SYSTEMS: ICD-10-CM

## 2022-01-01 DIAGNOSIS — R07.9 CHEST PAIN, UNSPECIFIED: ICD-10-CM

## 2022-01-01 DIAGNOSIS — N17.9 ACUTE KIDNEY FAILURE, UNSPECIFIED: ICD-10-CM

## 2022-01-01 DIAGNOSIS — E46 UNSPECIFIED PROTEIN-CALORIE MALNUTRITION: ICD-10-CM

## 2022-01-01 DIAGNOSIS — R10.84 GENERALIZED ABDOMINAL PAIN: ICD-10-CM

## 2022-01-01 DIAGNOSIS — J45.909 UNSPECIFIED ASTHMA, UNCOMPLICATED: ICD-10-CM

## 2022-01-01 DIAGNOSIS — Z29.9 ENCOUNTER FOR PROPHYLACTIC MEASURES, UNSPECIFIED: ICD-10-CM

## 2022-01-01 DIAGNOSIS — Z95.0 PRESENCE OF CARDIAC PACEMAKER: ICD-10-CM

## 2022-01-01 DIAGNOSIS — R00.1 BRADYCARDIA, UNSPECIFIED: ICD-10-CM

## 2022-01-01 DIAGNOSIS — F11.20 OPIOID DEPENDENCE, UNCOMPLICATED: ICD-10-CM

## 2022-01-01 DIAGNOSIS — R19.7 DIARRHEA, UNSPECIFIED: ICD-10-CM

## 2022-01-01 DIAGNOSIS — N20.0 CALCULUS OF KIDNEY: ICD-10-CM

## 2022-01-01 DIAGNOSIS — R04.2 HEMOPTYSIS: ICD-10-CM

## 2022-01-01 DIAGNOSIS — C79.9 SECONDARY MALIGNANT NEOPLASM OF UNSPECIFIED SITE: ICD-10-CM

## 2022-01-01 DIAGNOSIS — R52 PAIN, UNSPECIFIED: ICD-10-CM

## 2022-01-01 DIAGNOSIS — D49.7 NEOPLASM OF UNSPECIFIED BEHAVIOR OF ENDOCRINE GLANDS AND OTHER PARTS OF NERVOUS SYSTEM: ICD-10-CM

## 2022-01-01 DIAGNOSIS — R53.81 OTHER MALAISE: ICD-10-CM

## 2022-01-01 DIAGNOSIS — G89.3 NEOPLASM RELATED PAIN (ACUTE) (CHRONIC): ICD-10-CM

## 2022-01-01 DIAGNOSIS — Z51.5 ENCOUNTER FOR PALLIATIVE CARE: ICD-10-CM

## 2022-01-01 DIAGNOSIS — K59.01 SLOW TRANSIT CONSTIPATION: ICD-10-CM

## 2022-01-01 DIAGNOSIS — G89.29 OTHER CHRONIC PAIN: ICD-10-CM

## 2022-01-01 DIAGNOSIS — R06.02 SHORTNESS OF BREATH: ICD-10-CM

## 2022-01-01 DIAGNOSIS — N12 TUBULO-INTERSTITIAL NEPHRITIS, NOT SPECIFIED AS ACUTE OR CHRONIC: ICD-10-CM

## 2022-01-01 DIAGNOSIS — Z86.39 PERSONAL HISTORY OF OTHER ENDOCRINE, NUTRITIONAL AND METABOLIC DISEASE: ICD-10-CM

## 2022-01-01 DIAGNOSIS — R31.9 HEMATURIA, UNSPECIFIED: ICD-10-CM

## 2022-01-01 DIAGNOSIS — E16.1 OTHER HYPOGLYCEMIA: ICD-10-CM

## 2022-01-01 DIAGNOSIS — R53.83 OTHER FATIGUE: ICD-10-CM

## 2022-01-01 DIAGNOSIS — C68.9 MALIGNANT NEOPLASM OF URINARY ORGAN, UNSPECIFIED: ICD-10-CM

## 2022-01-01 DIAGNOSIS — T83.098A OTHER MECHANICAL COMPLICATION OF OTHER URINARY CATHETER, INITIAL ENCOUNTER: ICD-10-CM

## 2022-01-01 DIAGNOSIS — E83.52 HYPERCALCEMIA: ICD-10-CM

## 2022-01-01 DIAGNOSIS — M54.50 LOW BACK PAIN, UNSPECIFIED: ICD-10-CM

## 2022-01-01 DIAGNOSIS — F43.20 ADJUSTMENT DISORDER, UNSPECIFIED: ICD-10-CM

## 2022-01-01 DIAGNOSIS — R06.2 WHEEZING: ICD-10-CM

## 2022-01-01 DIAGNOSIS — R07.89 OTHER CHEST PAIN: ICD-10-CM

## 2022-01-01 DIAGNOSIS — E87.1 HYPO-OSMOLALITY AND HYPONATREMIA: ICD-10-CM

## 2022-01-01 DIAGNOSIS — E87.0 HYPEROSMOLALITY AND HYPERNATREMIA: ICD-10-CM

## 2022-01-01 DIAGNOSIS — Z45.010 ENCOUNTER FOR CHECKING AND TESTING OF CARDIAC PACEMAKER PULSE GENERATOR [BATTERY]: ICD-10-CM

## 2022-01-01 LAB
% ALBUMIN: 37.2 % — SIGNIFICANT CHANGE UP
% ALPHA 1: 12.9 % — SIGNIFICANT CHANGE UP
% ALPHA 2: 16.1 % — SIGNIFICANT CHANGE UP
% BETA: 13.4 % — SIGNIFICANT CHANGE UP
% GAMMA: 20.4 % — SIGNIFICANT CHANGE UP
% M SPIKE: SIGNIFICANT CHANGE UP
-  AMIKACIN: SIGNIFICANT CHANGE UP
-  AMOXICILLIN/CLAVULANIC ACID: SIGNIFICANT CHANGE UP
-  AMPICILLIN/SULBACTAM: SIGNIFICANT CHANGE UP
-  AMPICILLIN: SIGNIFICANT CHANGE UP
-  AZTREONAM: SIGNIFICANT CHANGE UP
-  CEFAZOLIN: SIGNIFICANT CHANGE UP
-  CEFEPIME: SIGNIFICANT CHANGE UP
-  CEFOXITIN: SIGNIFICANT CHANGE UP
-  CEFTRIAXONE: SIGNIFICANT CHANGE UP
-  CIPROFLOXACIN: SIGNIFICANT CHANGE UP
-  ERTAPENEM: SIGNIFICANT CHANGE UP
-  GENTAMICIN: SIGNIFICANT CHANGE UP
-  IMIPENEM: SIGNIFICANT CHANGE UP
-  LEVOFLOXACIN: SIGNIFICANT CHANGE UP
-  MEROPENEM: SIGNIFICANT CHANGE UP
-  NITROFURANTOIN: SIGNIFICANT CHANGE UP
-  PIPERACILLIN/TAZOBACTAM: SIGNIFICANT CHANGE UP
-  TETRACYCLINE: SIGNIFICANT CHANGE UP
-  TIGECYCLINE: SIGNIFICANT CHANGE UP
-  TOBRAMYCIN: SIGNIFICANT CHANGE UP
-  TRIMETHOPRIM/SULFAMETHOXAZOLE: SIGNIFICANT CHANGE UP
-  VANCOMYCIN: SIGNIFICANT CHANGE UP
24R-OH-CALCIDIOL SERPL-MCNC: 15.9 NG/ML — LOW (ref 30–80)
A1C WITH ESTIMATED AVERAGE GLUCOSE RESULT: 5.6 % — SIGNIFICANT CHANGE UP (ref 4–5.6)
A1C WITH ESTIMATED AVERAGE GLUCOSE RESULT: 5.7 % — HIGH (ref 4–5.6)
ACETONE SERPL-MCNC: NEGATIVE — SIGNIFICANT CHANGE UP
ALBUMIN SERPL ELPH-MCNC: 1.8 G/DL — LOW (ref 3.5–5)
ALBUMIN SERPL ELPH-MCNC: 2.1 G/DL — LOW (ref 3.5–5)
ALBUMIN SERPL ELPH-MCNC: 2.2 G/DL — LOW (ref 3.5–5)
ALBUMIN SERPL ELPH-MCNC: 2.2 G/DL — LOW (ref 3.5–5)
ALBUMIN SERPL ELPH-MCNC: 2.3 G/DL — LOW (ref 3.6–5.5)
ALBUMIN SERPL ELPH-MCNC: 2.5 G/DL — LOW (ref 3.5–5)
ALBUMIN SERPL ELPH-MCNC: 2.6 G/DL — LOW (ref 3.5–5)
ALBUMIN SERPL ELPH-MCNC: 2.7 G/DL — LOW (ref 3.5–5)
ALBUMIN SERPL ELPH-MCNC: 2.8 G/DL — LOW (ref 3.5–5)
ALBUMIN SERPL ELPH-MCNC: 2.9 G/DL — LOW (ref 3.5–5)
ALBUMIN SERPL ELPH-MCNC: 2.9 G/DL — LOW (ref 3.5–5)
ALBUMIN SERPL ELPH-MCNC: 3 G/DL — LOW (ref 3.5–5)
ALBUMIN SERPL ELPH-MCNC: 3.1 G/DL — LOW (ref 3.5–5)
ALBUMIN SERPL ELPH-MCNC: 3.1 G/DL — LOW (ref 3.5–5)
ALBUMIN SERPL ELPH-MCNC: 3.2 G/DL — LOW (ref 3.5–5)
ALBUMIN SERPL ELPH-MCNC: 3.3 G/DL
ALBUMIN SERPL ELPH-MCNC: 3.3 G/DL — LOW (ref 3.5–5)
ALBUMIN SERPL ELPH-MCNC: 3.5 G/DL — SIGNIFICANT CHANGE UP (ref 3.3–5)
ALBUMIN SERPL ELPH-MCNC: 3.6 G/DL — SIGNIFICANT CHANGE UP (ref 3.3–5)
ALBUMIN/GLOB SERPL ELPH: 0.6 RATIO — SIGNIFICANT CHANGE UP
ALP BLD-CCNC: 348 U/L
ALP SERPL-CCNC: 105 U/L — SIGNIFICANT CHANGE UP (ref 40–120)
ALP SERPL-CCNC: 107 U/L — SIGNIFICANT CHANGE UP (ref 40–120)
ALP SERPL-CCNC: 205 U/L — HIGH (ref 40–120)
ALP SERPL-CCNC: 217 U/L — HIGH (ref 40–120)
ALP SERPL-CCNC: 218 U/L — HIGH (ref 40–120)
ALP SERPL-CCNC: 220 U/L — HIGH (ref 40–120)
ALP SERPL-CCNC: 230 U/L — HIGH (ref 40–120)
ALP SERPL-CCNC: 263 U/L — HIGH (ref 40–120)
ALP SERPL-CCNC: 290 U/L — HIGH (ref 40–120)
ALP SERPL-CCNC: 81 U/L — SIGNIFICANT CHANGE UP (ref 40–120)
ALP SERPL-CCNC: 84 U/L — SIGNIFICANT CHANGE UP (ref 40–120)
ALP SERPL-CCNC: 87 U/L — SIGNIFICANT CHANGE UP (ref 40–120)
ALP SERPL-CCNC: 89 U/L — SIGNIFICANT CHANGE UP (ref 40–120)
ALP SERPL-CCNC: 90 U/L — SIGNIFICANT CHANGE UP (ref 40–120)
ALP SERPL-CCNC: 92 U/L — SIGNIFICANT CHANGE UP (ref 40–120)
ALP SERPL-CCNC: 93 U/L — SIGNIFICANT CHANGE UP (ref 40–120)
ALP SERPL-CCNC: 93 U/L — SIGNIFICANT CHANGE UP (ref 40–120)
ALP SERPL-CCNC: 94 U/L — SIGNIFICANT CHANGE UP (ref 40–120)
ALP SERPL-CCNC: 95 U/L — SIGNIFICANT CHANGE UP (ref 40–120)
ALP SERPL-CCNC: 96 U/L — SIGNIFICANT CHANGE UP (ref 40–120)
ALP SERPL-CCNC: 98 U/L — SIGNIFICANT CHANGE UP (ref 40–120)
ALP SERPL-CCNC: 98 U/L — SIGNIFICANT CHANGE UP (ref 40–120)
ALP SERPL-CCNC: 99 U/L — SIGNIFICANT CHANGE UP (ref 40–120)
ALP SERPL-CCNC: 99 U/L — SIGNIFICANT CHANGE UP (ref 40–120)
ALPHA1 GLOB SERPL ELPH-MCNC: 0.8 G/DL — HIGH (ref 0.1–0.4)
ALPHA2 GLOB SERPL ELPH-MCNC: 1 G/DL — SIGNIFICANT CHANGE UP (ref 0.5–1)
ALT FLD-CCNC: 10 U/L — SIGNIFICANT CHANGE UP (ref 4–33)
ALT FLD-CCNC: 11 U/L DA — SIGNIFICANT CHANGE UP (ref 10–60)
ALT FLD-CCNC: 12 U/L DA — SIGNIFICANT CHANGE UP (ref 10–60)
ALT FLD-CCNC: 13 U/L DA — SIGNIFICANT CHANGE UP (ref 10–60)
ALT FLD-CCNC: 14 U/L DA — SIGNIFICANT CHANGE UP (ref 10–60)
ALT FLD-CCNC: 15 U/L DA — SIGNIFICANT CHANGE UP (ref 10–60)
ALT FLD-CCNC: 15 U/L DA — SIGNIFICANT CHANGE UP (ref 10–60)
ALT FLD-CCNC: 16 U/L DA — SIGNIFICANT CHANGE UP (ref 10–60)
ALT FLD-CCNC: 17 U/L DA — SIGNIFICANT CHANGE UP (ref 10–60)
ALT FLD-CCNC: 17 U/L DA — SIGNIFICANT CHANGE UP (ref 10–60)
ALT FLD-CCNC: 19 U/L DA — SIGNIFICANT CHANGE UP (ref 10–60)
ALT FLD-CCNC: 19 U/L DA — SIGNIFICANT CHANGE UP (ref 10–60)
ALT FLD-CCNC: 20 U/L DA — SIGNIFICANT CHANGE UP (ref 10–60)
ALT FLD-CCNC: 20 U/L DA — SIGNIFICANT CHANGE UP (ref 10–60)
ALT FLD-CCNC: 21 U/L DA — SIGNIFICANT CHANGE UP (ref 10–60)
ALT FLD-CCNC: 22 U/L DA — SIGNIFICANT CHANGE UP (ref 10–60)
ALT FLD-CCNC: 22 U/L DA — SIGNIFICANT CHANGE UP (ref 10–60)
ALT FLD-CCNC: 27 U/L DA — SIGNIFICANT CHANGE UP (ref 10–60)
ALT FLD-CCNC: 8 U/L — SIGNIFICANT CHANGE UP (ref 4–33)
ALT SERPL-CCNC: 17 U/L
ANION GAP SERPL CALC-SCNC: 10 MMOL/L — SIGNIFICANT CHANGE UP (ref 5–17)
ANION GAP SERPL CALC-SCNC: 11 MMOL/L — SIGNIFICANT CHANGE UP (ref 5–17)
ANION GAP SERPL CALC-SCNC: 11 MMOL/L — SIGNIFICANT CHANGE UP (ref 5–17)
ANION GAP SERPL CALC-SCNC: 11 MMOL/L — SIGNIFICANT CHANGE UP (ref 7–14)
ANION GAP SERPL CALC-SCNC: 12 MMOL/L — SIGNIFICANT CHANGE UP (ref 7–14)
ANION GAP SERPL CALC-SCNC: 13 MMOL/L
ANION GAP SERPL CALC-SCNC: 13 MMOL/L — SIGNIFICANT CHANGE UP (ref 5–17)
ANION GAP SERPL CALC-SCNC: 13 MMOL/L — SIGNIFICANT CHANGE UP (ref 7–14)
ANION GAP SERPL CALC-SCNC: 14 MMOL/L
ANION GAP SERPL CALC-SCNC: 14 MMOL/L — SIGNIFICANT CHANGE UP (ref 5–17)
ANION GAP SERPL CALC-SCNC: 14 MMOL/L — SIGNIFICANT CHANGE UP (ref 5–17)
ANION GAP SERPL CALC-SCNC: 4 MMOL/L — LOW (ref 5–17)
ANION GAP SERPL CALC-SCNC: 5 MMOL/L — SIGNIFICANT CHANGE UP (ref 5–17)
ANION GAP SERPL CALC-SCNC: 6 MMOL/L — SIGNIFICANT CHANGE UP (ref 5–17)
ANION GAP SERPL CALC-SCNC: 7 MMOL/L — SIGNIFICANT CHANGE UP (ref 5–17)
ANION GAP SERPL CALC-SCNC: 8 MMOL/L — SIGNIFICANT CHANGE UP (ref 5–17)
ANION GAP SERPL CALC-SCNC: 8 MMOL/L — SIGNIFICANT CHANGE UP (ref 7–14)
ANION GAP SERPL CALC-SCNC: 9 MMOL/L — SIGNIFICANT CHANGE UP (ref 5–17)
ANION GAP SERPL CALC-SCNC: 9 MMOL/L — SIGNIFICANT CHANGE UP (ref 7–14)
APPEARANCE UR: ABNORMAL
APPEARANCE UR: CLEAR — SIGNIFICANT CHANGE UP
APPEARANCE: ABNORMAL
APPEARANCE: ABNORMAL
APTT BLD: 23.6 SEC — LOW (ref 27–36.3)
APTT BLD: 24.9 SEC — LOW (ref 27.5–35.5)
APTT BLD: 27.3 SEC — LOW (ref 27.5–35.5)
APTT BLD: 28.4 SEC
APTT BLD: 28.6 SEC — SIGNIFICANT CHANGE UP (ref 27.5–35.5)
APTT BLD: 28.9 SEC — SIGNIFICANT CHANGE UP (ref 27.5–35.5)
APTT BLD: 29 SEC — SIGNIFICANT CHANGE UP (ref 27.5–35.5)
APTT BLD: 30.7 SEC — SIGNIFICANT CHANGE UP (ref 27.5–35.5)
APTT BLD: 31.1 SEC — SIGNIFICANT CHANGE UP (ref 27.5–35.5)
APTT BLD: 32.5 SEC — SIGNIFICANT CHANGE UP (ref 27.5–35.5)
AST SERPL-CCNC: 11 U/L — SIGNIFICANT CHANGE UP (ref 10–40)
AST SERPL-CCNC: 13 U/L — SIGNIFICANT CHANGE UP (ref 10–40)
AST SERPL-CCNC: 13 U/L — SIGNIFICANT CHANGE UP (ref 4–32)
AST SERPL-CCNC: 14 U/L — SIGNIFICANT CHANGE UP (ref 10–40)
AST SERPL-CCNC: 15 U/L — SIGNIFICANT CHANGE UP (ref 10–40)
AST SERPL-CCNC: 16 U/L — SIGNIFICANT CHANGE UP (ref 10–40)
AST SERPL-CCNC: 17 U/L — SIGNIFICANT CHANGE UP (ref 10–40)
AST SERPL-CCNC: 18 U/L
AST SERPL-CCNC: 18 U/L — SIGNIFICANT CHANGE UP (ref 10–40)
AST SERPL-CCNC: 18 U/L — SIGNIFICANT CHANGE UP (ref 4–32)
AST SERPL-CCNC: 19 U/L — SIGNIFICANT CHANGE UP (ref 10–40)
AST SERPL-CCNC: 19 U/L — SIGNIFICANT CHANGE UP (ref 10–40)
AST SERPL-CCNC: 20 U/L — SIGNIFICANT CHANGE UP (ref 10–40)
AST SERPL-CCNC: 21 U/L — SIGNIFICANT CHANGE UP (ref 10–40)
AST SERPL-CCNC: 22 U/L — SIGNIFICANT CHANGE UP (ref 10–40)
AST SERPL-CCNC: 24 U/L — SIGNIFICANT CHANGE UP (ref 10–40)
AST SERPL-CCNC: 25 U/L — SIGNIFICANT CHANGE UP (ref 10–40)
AST SERPL-CCNC: 32 U/L — SIGNIFICANT CHANGE UP (ref 10–40)
AST SERPL-CCNC: 32 U/L — SIGNIFICANT CHANGE UP (ref 10–40)
AST SERPL-CCNC: 39 U/L — SIGNIFICANT CHANGE UP (ref 10–40)
AST SERPL-CCNC: 43 U/L — HIGH (ref 10–40)
B PERT DNA SPEC QL NAA+PROBE: SIGNIFICANT CHANGE UP
B PERT+PARAPERT DNA PNL SPEC NAA+PROBE: SIGNIFICANT CHANGE UP
B-GLOBULIN SERPL ELPH-MCNC: 0.8 G/DL — SIGNIFICANT CHANGE UP (ref 0.5–1)
BACTERIA # UR AUTO: ABNORMAL
BACTERIA # UR AUTO: ABNORMAL /HPF
BACTERIA # UR AUTO: SIGNIFICANT CHANGE UP /HPF
BACTERIA UR CULT: NORMAL
BACTERIA UR CULT: NORMAL
BACTERIA: ABNORMAL
BACTERIA: NEGATIVE
BASOPHILS # BLD AUTO: 0.02 K/UL — SIGNIFICANT CHANGE UP (ref 0–0.2)
BASOPHILS # BLD AUTO: 0.03 K/UL
BASOPHILS # BLD AUTO: 0.03 K/UL — SIGNIFICANT CHANGE UP (ref 0–0.2)
BASOPHILS # BLD AUTO: 0.04 K/UL — SIGNIFICANT CHANGE UP (ref 0–0.2)
BASOPHILS NFR BLD AUTO: 0.1 % — SIGNIFICANT CHANGE UP (ref 0–2)
BASOPHILS NFR BLD AUTO: 0.2 % — SIGNIFICANT CHANGE UP (ref 0–2)
BASOPHILS NFR BLD AUTO: 0.2 % — SIGNIFICANT CHANGE UP (ref 0–2)
BASOPHILS NFR BLD AUTO: 0.3 %
BASOPHILS NFR BLD AUTO: 0.3 % — SIGNIFICANT CHANGE UP (ref 0–2)
BASOPHILS NFR BLD AUTO: 0.4 % — SIGNIFICANT CHANGE UP (ref 0–2)
BASOPHILS NFR BLD AUTO: 0.5 % — SIGNIFICANT CHANGE UP (ref 0–2)
BASOPHILS NFR BLD AUTO: 0.6 % — SIGNIFICANT CHANGE UP (ref 0–2)
BILIRUB DIRECT SERPL-MCNC: <0.1 MG/DL — SIGNIFICANT CHANGE UP (ref 0–0.3)
BILIRUB INDIRECT FLD-MCNC: >0.1 MG/DL — LOW (ref 0.2–1)
BILIRUB SERPL-MCNC: 0.1 MG/DL — LOW (ref 0.2–1.2)
BILIRUB SERPL-MCNC: 0.2 MG/DL
BILIRUB SERPL-MCNC: 0.2 MG/DL — SIGNIFICANT CHANGE UP (ref 0.2–1.2)
BILIRUB SERPL-MCNC: 0.3 MG/DL — SIGNIFICANT CHANGE UP (ref 0.2–1.2)
BILIRUB SERPL-MCNC: 0.4 MG/DL — SIGNIFICANT CHANGE UP (ref 0.2–1.2)
BILIRUB SERPL-MCNC: 0.5 MG/DL — SIGNIFICANT CHANGE UP (ref 0.2–1.2)
BILIRUB SERPL-MCNC: <0.1 MG/DL — LOW (ref 0.2–1.2)
BILIRUB SERPL-MCNC: <0.2 MG/DL — SIGNIFICANT CHANGE UP (ref 0.2–1.2)
BILIRUB SERPL-MCNC: <0.2 MG/DL — SIGNIFICANT CHANGE UP (ref 0.2–1.2)
BILIRUB UR-MCNC: NEGATIVE — SIGNIFICANT CHANGE UP
BILIRUBIN URINE: NEGATIVE
BILIRUBIN URINE: NEGATIVE
BLD GP AB SCN SERPL QL: NEGATIVE — SIGNIFICANT CHANGE UP
BLD GP AB SCN SERPL QL: NEGATIVE — SIGNIFICANT CHANGE UP
BLD GP AB SCN SERPL QL: SIGNIFICANT CHANGE UP
BLOOD URINE: ABNORMAL
BLOOD URINE: ABNORMAL
BORDETELLA PARAPERTUSSIS (RAPRVP): SIGNIFICANT CHANGE UP
BUN SERPL-MCNC: 15 MG/DL — SIGNIFICANT CHANGE UP (ref 7–18)
BUN SERPL-MCNC: 16 MG/DL — SIGNIFICANT CHANGE UP (ref 7–18)
BUN SERPL-MCNC: 16 MG/DL — SIGNIFICANT CHANGE UP (ref 7–23)
BUN SERPL-MCNC: 17 MG/DL — SIGNIFICANT CHANGE UP (ref 7–18)
BUN SERPL-MCNC: 17 MG/DL — SIGNIFICANT CHANGE UP (ref 7–23)
BUN SERPL-MCNC: 17 MG/DL — SIGNIFICANT CHANGE UP (ref 7–23)
BUN SERPL-MCNC: 18 MG/DL
BUN SERPL-MCNC: 18 MG/DL — SIGNIFICANT CHANGE UP (ref 7–18)
BUN SERPL-MCNC: 18 MG/DL — SIGNIFICANT CHANGE UP (ref 7–23)
BUN SERPL-MCNC: 18 MG/DL — SIGNIFICANT CHANGE UP (ref 7–23)
BUN SERPL-MCNC: 19 MG/DL
BUN SERPL-MCNC: 19 MG/DL — HIGH (ref 7–18)
BUN SERPL-MCNC: 19 MG/DL — SIGNIFICANT CHANGE UP (ref 7–23)
BUN SERPL-MCNC: 20 MG/DL — HIGH (ref 7–18)
BUN SERPL-MCNC: 20 MG/DL — SIGNIFICANT CHANGE UP (ref 7–23)
BUN SERPL-MCNC: 21 MG/DL — HIGH (ref 7–18)
BUN SERPL-MCNC: 21 MG/DL — SIGNIFICANT CHANGE UP (ref 7–23)
BUN SERPL-MCNC: 22 MG/DL — HIGH (ref 7–18)
BUN SERPL-MCNC: 23 MG/DL — HIGH (ref 7–18)
BUN SERPL-MCNC: 24 MG/DL — HIGH (ref 7–18)
BUN SERPL-MCNC: 24 MG/DL — HIGH (ref 7–18)
BUN SERPL-MCNC: 25 MG/DL — HIGH (ref 7–18)
BUN SERPL-MCNC: 25 MG/DL — HIGH (ref 7–23)
BUN SERPL-MCNC: 25 MG/DL — HIGH (ref 7–23)
BUN SERPL-MCNC: 27 MG/DL — HIGH (ref 7–18)
BUN SERPL-MCNC: 27 MG/DL — HIGH (ref 7–18)
BUN SERPL-MCNC: 28 MG/DL — HIGH (ref 7–18)
BUN SERPL-MCNC: 29 MG/DL — HIGH (ref 7–18)
BUN SERPL-MCNC: 30 MG/DL — HIGH (ref 7–18)
BUN SERPL-MCNC: 30 MG/DL — HIGH (ref 7–18)
BUN SERPL-MCNC: 33 MG/DL — HIGH (ref 7–18)
BUN SERPL-MCNC: 36 MG/DL — HIGH (ref 7–18)
BUN SERPL-MCNC: 37 MG/DL — HIGH (ref 7–18)
BUN SERPL-MCNC: 41 MG/DL — HIGH (ref 7–18)
BUN SERPL-MCNC: 41 MG/DL — HIGH (ref 7–18)
BUN SERPL-MCNC: 42 MG/DL — HIGH (ref 7–18)
C PEPTIDE SERPL-MCNC: 2.4 NG/ML — SIGNIFICANT CHANGE UP (ref 1.1–4.4)
C PEPTIDE SERPL-MCNC: 6.1 NG/ML — HIGH (ref 1.1–4.4)
C PNEUM DNA SPEC QL NAA+PROBE: SIGNIFICANT CHANGE UP
CA-I BLD-SCNC: 5.4 MG/DL — SIGNIFICANT CHANGE UP (ref 4.5–5.6)
CALCIUM SERPL-MCNC: 10 MG/DL — SIGNIFICANT CHANGE UP (ref 8.4–10.5)
CALCIUM SERPL-MCNC: 10.1 MG/DL — SIGNIFICANT CHANGE UP (ref 8.4–10.5)
CALCIUM SERPL-MCNC: 10.2 MG/DL
CALCIUM SERPL-MCNC: 10.2 MG/DL — SIGNIFICANT CHANGE UP (ref 8.4–10.5)
CALCIUM SERPL-MCNC: 10.3 MG/DL — SIGNIFICANT CHANGE UP (ref 8.4–10.5)
CALCIUM SERPL-MCNC: 10.4 MG/DL — SIGNIFICANT CHANGE UP (ref 8.4–10.5)
CALCIUM SERPL-MCNC: 10.5 MG/DL — SIGNIFICANT CHANGE UP (ref 8.4–10.5)
CALCIUM SERPL-MCNC: 10.6 MG/DL — HIGH (ref 8.4–10.5)
CALCIUM SERPL-MCNC: 10.6 MG/DL — HIGH (ref 8.4–10.5)
CALCIUM SERPL-MCNC: 10.7 MG/DL — HIGH (ref 8.4–10.5)
CALCIUM SERPL-MCNC: 10.8 MG/DL — HIGH (ref 8.4–10.5)
CALCIUM SERPL-MCNC: 11 MG/DL — HIGH (ref 8.4–10.5)
CALCIUM SERPL-MCNC: 11.1 MG/DL — HIGH (ref 8.4–10.5)
CALCIUM SERPL-MCNC: 11.1 MG/DL — HIGH (ref 8.4–10.5)
CALCIUM SERPL-MCNC: 11.3 MG/DL — HIGH (ref 8.4–10.5)
CALCIUM SERPL-MCNC: 11.3 MG/DL — HIGH (ref 8.4–10.5)
CALCIUM SERPL-MCNC: 11.4 MG/DL — HIGH (ref 8.4–10.5)
CALCIUM SERPL-MCNC: 11.4 MG/DL — HIGH (ref 8.4–10.5)
CALCIUM SERPL-MCNC: 11.5 MG/DL
CALCIUM SERPL-MCNC: 11.5 MG/DL — HIGH (ref 8.4–10.5)
CALCIUM SERPL-MCNC: 11.6 MG/DL — HIGH (ref 8.4–10.5)
CALCIUM SERPL-MCNC: 9 MG/DL — SIGNIFICANT CHANGE UP (ref 8.4–10.5)
CALCIUM SERPL-MCNC: 9.2 MG/DL — SIGNIFICANT CHANGE UP (ref 8.4–10.5)
CALCIUM SERPL-MCNC: 9.4 MG/DL — SIGNIFICANT CHANGE UP (ref 8.4–10.5)
CALCIUM SERPL-MCNC: 9.6 MG/DL — SIGNIFICANT CHANGE UP (ref 8.4–10.5)
CALCIUM SERPL-MCNC: 9.7 MG/DL — SIGNIFICANT CHANGE UP (ref 8.4–10.5)
CALCIUM SERPL-MCNC: 9.8 MG/DL — SIGNIFICANT CHANGE UP (ref 8.4–10.5)
CALCIUM SERPL-MCNC: 9.9 MG/DL — SIGNIFICANT CHANGE UP (ref 8.4–10.5)
CHLORIDE SERPL-SCNC: 100 MMOL/L — SIGNIFICANT CHANGE UP (ref 96–108)
CHLORIDE SERPL-SCNC: 100 MMOL/L — SIGNIFICANT CHANGE UP (ref 98–107)
CHLORIDE SERPL-SCNC: 101 MMOL/L — SIGNIFICANT CHANGE UP (ref 96–108)
CHLORIDE SERPL-SCNC: 101 MMOL/L — SIGNIFICANT CHANGE UP (ref 98–107)
CHLORIDE SERPL-SCNC: 102 MMOL/L — SIGNIFICANT CHANGE UP (ref 96–108)
CHLORIDE SERPL-SCNC: 102 MMOL/L — SIGNIFICANT CHANGE UP (ref 98–107)
CHLORIDE SERPL-SCNC: 102 MMOL/L — SIGNIFICANT CHANGE UP (ref 98–107)
CHLORIDE SERPL-SCNC: 103 MMOL/L — SIGNIFICANT CHANGE UP (ref 96–108)
CHLORIDE SERPL-SCNC: 103 MMOL/L — SIGNIFICANT CHANGE UP (ref 98–107)
CHLORIDE SERPL-SCNC: 104 MMOL/L — SIGNIFICANT CHANGE UP (ref 96–108)
CHLORIDE SERPL-SCNC: 105 MMOL/L — SIGNIFICANT CHANGE UP (ref 96–108)
CHLORIDE SERPL-SCNC: 106 MMOL/L — SIGNIFICANT CHANGE UP (ref 96–108)
CHLORIDE SERPL-SCNC: 107 MMOL/L — SIGNIFICANT CHANGE UP (ref 96–108)
CHLORIDE SERPL-SCNC: 108 MMOL/L — SIGNIFICANT CHANGE UP (ref 96–108)
CHLORIDE SERPL-SCNC: 109 MMOL/L — HIGH (ref 96–108)
CHLORIDE SERPL-SCNC: 110 MMOL/L — HIGH (ref 96–108)
CHLORIDE SERPL-SCNC: 111 MMOL/L — HIGH (ref 96–108)
CHLORIDE SERPL-SCNC: 112 MMOL/L — HIGH (ref 96–108)
CHLORIDE SERPL-SCNC: 126 MMOL/L — HIGH (ref 96–108)
CHLORIDE SERPL-SCNC: 96 MMOL/L
CHLORIDE SERPL-SCNC: 98 MMOL/L
CHLORIDE SERPL-SCNC: 98 MMOL/L — SIGNIFICANT CHANGE UP (ref 96–108)
CHLORIDE SERPL-SCNC: 98 MMOL/L — SIGNIFICANT CHANGE UP (ref 98–107)
CHLORIDE SERPL-SCNC: 99 MMOL/L — SIGNIFICANT CHANGE UP (ref 96–108)
CHLORIDE SERPL-SCNC: 99 MMOL/L — SIGNIFICANT CHANGE UP (ref 98–107)
CHOLEST SERPL-MCNC: 195 MG/DL — SIGNIFICANT CHANGE UP
CK MB BLD-MCNC: <2 % — SIGNIFICANT CHANGE UP (ref 0–2.5)
CK MB BLD-MCNC: <2.7 % — SIGNIFICANT CHANGE UP (ref 0–3.5)
CK MB BLD-MCNC: <3 % — HIGH (ref 0–2.5)
CK MB BLD-MCNC: <3 % — HIGH (ref 0–2.5)
CK MB BLD-MCNC: <3 % — SIGNIFICANT CHANGE UP (ref 0–3.5)
CK MB BLD-MCNC: <3.3 % — HIGH (ref 0–2.5)
CK MB CFR SERPL CALC: <1 NG/ML — SIGNIFICANT CHANGE UP
CK MB CFR SERPL CALC: <1 NG/ML — SIGNIFICANT CHANGE UP (ref 0–3.6)
CK MB CFR SERPL CALC: <1 NG/ML — SIGNIFICANT CHANGE UP (ref 0–3.6)
CK SERPL-CCNC: 30 U/L — SIGNIFICANT CHANGE UP (ref 25–170)
CK SERPL-CCNC: 33 U/L — SIGNIFICANT CHANGE UP (ref 21–215)
CK SERPL-CCNC: 33 U/L — SIGNIFICANT CHANGE UP (ref 25–170)
CK SERPL-CCNC: 33 U/L — SIGNIFICANT CHANGE UP (ref 25–170)
CK SERPL-CCNC: 37 U/L — SIGNIFICANT CHANGE UP (ref 21–215)
CK SERPL-CCNC: 49 U/L — SIGNIFICANT CHANGE UP (ref 25–170)
CO2 SERPL-SCNC: 18 MMOL/L — LOW (ref 22–31)
CO2 SERPL-SCNC: 20 MMOL/L — LOW (ref 22–31)
CO2 SERPL-SCNC: 21 MMOL/L — LOW (ref 22–31)
CO2 SERPL-SCNC: 22 MMOL/L — SIGNIFICANT CHANGE UP (ref 22–31)
CO2 SERPL-SCNC: 22 MMOL/L — SIGNIFICANT CHANGE UP (ref 22–31)
CO2 SERPL-SCNC: 23 MMOL/L — SIGNIFICANT CHANGE UP (ref 22–31)
CO2 SERPL-SCNC: 24 MMOL/L
CO2 SERPL-SCNC: 24 MMOL/L — SIGNIFICANT CHANGE UP (ref 22–31)
CO2 SERPL-SCNC: 25 MMOL/L — SIGNIFICANT CHANGE UP (ref 22–31)
CO2 SERPL-SCNC: 26 MMOL/L
CO2 SERPL-SCNC: 26 MMOL/L — SIGNIFICANT CHANGE UP (ref 22–31)
CO2 SERPL-SCNC: 27 MMOL/L — SIGNIFICANT CHANGE UP (ref 22–31)
CO2 SERPL-SCNC: 28 MMOL/L — SIGNIFICANT CHANGE UP (ref 22–31)
CO2 SERPL-SCNC: 29 MMOL/L — SIGNIFICANT CHANGE UP (ref 22–31)
CO2 SERPL-SCNC: 31 MMOL/L — SIGNIFICANT CHANGE UP (ref 22–31)
COLOR SPEC: ABNORMAL
COLOR SPEC: SIGNIFICANT CHANGE UP
COLOR SPEC: YELLOW — SIGNIFICANT CHANGE UP
COLOR: ABNORMAL
COLOR: NORMAL
COMMENT - URINE: SIGNIFICANT CHANGE UP
CORTICOSTEROID BINDING GLOBULIN RESULT: 2.3 MG/DL — SIGNIFICANT CHANGE UP
CORTICOSTEROID BINDING GLOBULIN RESULT: 3.1 MG/DL — SIGNIFICANT CHANGE UP
CORTIS AM PEAK SERPL-MCNC: 12.1 UG/DL — SIGNIFICANT CHANGE UP (ref 6–18.4)
CORTIS F/TOTAL MFR SERPL: 6.2 % — SIGNIFICANT CHANGE UP
CORTIS F/TOTAL MFR SERPL: 8.4 % — SIGNIFICANT CHANGE UP
CORTIS SERPL-MCNC: 11 UG/DL — SIGNIFICANT CHANGE UP
CORTIS SERPL-MCNC: 19 UG/DL — SIGNIFICANT CHANGE UP
CORTISOL, FREE RESULT: 0.68 UG/DL — SIGNIFICANT CHANGE UP
CORTISOL, FREE RESULT: 1.6 UG/DL — SIGNIFICANT CHANGE UP
CREAT ?TM UR-MCNC: <13 MG/DL — SIGNIFICANT CHANGE UP
CREAT SERPL-MCNC: 0.69 MG/DL — SIGNIFICANT CHANGE UP (ref 0.5–1.3)
CREAT SERPL-MCNC: 0.7 MG/DL — SIGNIFICANT CHANGE UP (ref 0.5–1.3)
CREAT SERPL-MCNC: 0.74 MG/DL — SIGNIFICANT CHANGE UP (ref 0.5–1.3)
CREAT SERPL-MCNC: 0.75 MG/DL
CREAT SERPL-MCNC: 0.79 MG/DL — SIGNIFICANT CHANGE UP (ref 0.5–1.3)
CREAT SERPL-MCNC: 0.81 MG/DL — SIGNIFICANT CHANGE UP (ref 0.5–1.3)
CREAT SERPL-MCNC: 0.82 MG/DL — SIGNIFICANT CHANGE UP (ref 0.5–1.3)
CREAT SERPL-MCNC: 0.82 MG/DL — SIGNIFICANT CHANGE UP (ref 0.5–1.3)
CREAT SERPL-MCNC: 0.83 MG/DL — SIGNIFICANT CHANGE UP (ref 0.5–1.3)
CREAT SERPL-MCNC: 0.84 MG/DL — SIGNIFICANT CHANGE UP (ref 0.5–1.3)
CREAT SERPL-MCNC: 0.85 MG/DL — SIGNIFICANT CHANGE UP (ref 0.5–1.3)
CREAT SERPL-MCNC: 0.86 MG/DL — SIGNIFICANT CHANGE UP (ref 0.5–1.3)
CREAT SERPL-MCNC: 0.87 MG/DL — SIGNIFICANT CHANGE UP (ref 0.5–1.3)
CREAT SERPL-MCNC: 0.88 MG/DL — SIGNIFICANT CHANGE UP (ref 0.5–1.3)
CREAT SERPL-MCNC: 0.89 MG/DL — SIGNIFICANT CHANGE UP (ref 0.5–1.3)
CREAT SERPL-MCNC: 0.89 MG/DL — SIGNIFICANT CHANGE UP (ref 0.5–1.3)
CREAT SERPL-MCNC: 0.9 MG/DL — SIGNIFICANT CHANGE UP (ref 0.5–1.3)
CREAT SERPL-MCNC: 0.9 MG/DL — SIGNIFICANT CHANGE UP (ref 0.5–1.3)
CREAT SERPL-MCNC: 0.91 MG/DL — SIGNIFICANT CHANGE UP (ref 0.5–1.3)
CREAT SERPL-MCNC: 0.92 MG/DL
CREAT SERPL-MCNC: 0.92 MG/DL — SIGNIFICANT CHANGE UP (ref 0.5–1.3)
CREAT SERPL-MCNC: 0.93 MG/DL — SIGNIFICANT CHANGE UP (ref 0.5–1.3)
CREAT SERPL-MCNC: 0.94 MG/DL — SIGNIFICANT CHANGE UP (ref 0.5–1.3)
CREAT SERPL-MCNC: 0.95 MG/DL — SIGNIFICANT CHANGE UP (ref 0.5–1.3)
CREAT SERPL-MCNC: 0.95 MG/DL — SIGNIFICANT CHANGE UP (ref 0.5–1.3)
CREAT SERPL-MCNC: 0.96 MG/DL — SIGNIFICANT CHANGE UP (ref 0.5–1.3)
CREAT SERPL-MCNC: 0.96 MG/DL — SIGNIFICANT CHANGE UP (ref 0.5–1.3)
CREAT SERPL-MCNC: 0.97 MG/DL — SIGNIFICANT CHANGE UP (ref 0.5–1.3)
CREAT SERPL-MCNC: 0.98 MG/DL — SIGNIFICANT CHANGE UP (ref 0.5–1.3)
CREAT SERPL-MCNC: 0.99 MG/DL — SIGNIFICANT CHANGE UP (ref 0.5–1.3)
CREAT SERPL-MCNC: 1 MG/DL — SIGNIFICANT CHANGE UP (ref 0.5–1.3)
CREAT SERPL-MCNC: 1.01 MG/DL — SIGNIFICANT CHANGE UP (ref 0.5–1.3)
CREAT SERPL-MCNC: 1.02 MG/DL — SIGNIFICANT CHANGE UP (ref 0.5–1.3)
CREAT SERPL-MCNC: 1.03 MG/DL — SIGNIFICANT CHANGE UP (ref 0.5–1.3)
CREAT SERPL-MCNC: 1.04 MG/DL — SIGNIFICANT CHANGE UP (ref 0.5–1.3)
CREAT SERPL-MCNC: 1.05 MG/DL — SIGNIFICANT CHANGE UP (ref 0.5–1.3)
CREAT SERPL-MCNC: 1.05 MG/DL — SIGNIFICANT CHANGE UP (ref 0.5–1.3)
CREAT SERPL-MCNC: 1.06 MG/DL — SIGNIFICANT CHANGE UP (ref 0.5–1.3)
CREAT SERPL-MCNC: 1.06 MG/DL — SIGNIFICANT CHANGE UP (ref 0.5–1.3)
CREAT SERPL-MCNC: 1.09 MG/DL — SIGNIFICANT CHANGE UP (ref 0.5–1.3)
CREAT SERPL-MCNC: 1.1 MG/DL — SIGNIFICANT CHANGE UP (ref 0.5–1.3)
CREAT SERPL-MCNC: 1.11 MG/DL — SIGNIFICANT CHANGE UP (ref 0.5–1.3)
CREAT SERPL-MCNC: 1.11 MG/DL — SIGNIFICANT CHANGE UP (ref 0.5–1.3)
CREAT SERPL-MCNC: 1.12 MG/DL — SIGNIFICANT CHANGE UP (ref 0.5–1.3)
CREAT SERPL-MCNC: 1.13 MG/DL — SIGNIFICANT CHANGE UP (ref 0.5–1.3)
CREAT SERPL-MCNC: 1.15 MG/DL — SIGNIFICANT CHANGE UP (ref 0.5–1.3)
CREAT SERPL-MCNC: 1.15 MG/DL — SIGNIFICANT CHANGE UP (ref 0.5–1.3)
CREAT SERPL-MCNC: 1.17 MG/DL — SIGNIFICANT CHANGE UP (ref 0.5–1.3)
CREAT SERPL-MCNC: 1.18 MG/DL — SIGNIFICANT CHANGE UP (ref 0.5–1.3)
CREAT SERPL-MCNC: 1.19 MG/DL — SIGNIFICANT CHANGE UP (ref 0.5–1.3)
CREAT SERPL-MCNC: 1.2 MG/DL — SIGNIFICANT CHANGE UP (ref 0.5–1.3)
CREAT SERPL-MCNC: 1.23 MG/DL — SIGNIFICANT CHANGE UP (ref 0.5–1.3)
CREAT SERPL-MCNC: 1.3 MG/DL — SIGNIFICANT CHANGE UP (ref 0.5–1.3)
CREAT SERPL-MCNC: 1.31 MG/DL — HIGH (ref 0.5–1.3)
CREAT SERPL-MCNC: 1.33 MG/DL — HIGH (ref 0.5–1.3)
CREAT SERPL-MCNC: 1.35 MG/DL — HIGH (ref 0.5–1.3)
CREAT SERPL-MCNC: 1.36 MG/DL — HIGH (ref 0.5–1.3)
CREAT SERPL-MCNC: 1.36 MG/DL — HIGH (ref 0.5–1.3)
CREAT SERPL-MCNC: 1.38 MG/DL — HIGH (ref 0.5–1.3)
CREAT SERPL-MCNC: 1.38 MG/DL — HIGH (ref 0.5–1.3)
CREAT SERPL-MCNC: 1.4 MG/DL — HIGH (ref 0.5–1.3)
CREAT SERPL-MCNC: 1.4 MG/DL — HIGH (ref 0.5–1.3)
CREAT SERPL-MCNC: 1.41 MG/DL — HIGH (ref 0.5–1.3)
CREAT SERPL-MCNC: 1.47 MG/DL — HIGH (ref 0.5–1.3)
CREAT SERPL-MCNC: 1.49 MG/DL — HIGH (ref 0.5–1.3)
CULTURE RESULTS: NO GROWTH — SIGNIFICANT CHANGE UP
CULTURE RESULTS: SIGNIFICANT CHANGE UP
D DIMER BLD IA.RAPID-MCNC: 242 NG/ML DDU — HIGH
D DIMER BLD IA.RAPID-MCNC: 3902 NG/ML DDU — HIGH
D DIMER BLD IA.RAPID-MCNC: <150 NG/ML DDU — SIGNIFICANT CHANGE UP
DIFF PNL FLD: ABNORMAL
EGFR: 102 ML/MIN/1.73M2 — SIGNIFICANT CHANGE UP
EGFR: 103 ML/MIN/1.73M2 — SIGNIFICANT CHANGE UP
EGFR: 41 ML/MIN/1.73M2 — LOW
EGFR: 42 ML/MIN/1.73M2 — LOW
EGFR: 44 ML/MIN/1.73M2 — LOW
EGFR: 45 ML/MIN/1.73M2 — LOW
EGFR: 46 ML/MIN/1.73M2 — LOW
EGFR: 46 ML/MIN/1.73M2 — LOW
EGFR: 47 ML/MIN/1.73M2 — LOW
EGFR: 48 ML/MIN/1.73M2 — LOW
EGFR: 48 ML/MIN/1.73M2 — LOW
EGFR: 49 ML/MIN/1.73M2 — LOW
EGFR: 52 ML/MIN/1.73M2 — LOW
EGFR: 54 ML/MIN/1.73M2 — LOW
EGFR: 55 ML/MIN/1.73M2 — LOW
EGFR: 57 ML/MIN/1.73M2 — LOW
EGFR: 57 ML/MIN/1.73M2 — LOW
EGFR: 58 ML/MIN/1.73M2 — LOW
EGFR: 59 ML/MIN/1.73M2 — LOW
EGFR: 59 ML/MIN/1.73M2 — LOW
EGFR: 60 ML/MIN/1.73M2 — SIGNIFICANT CHANGE UP
EGFR: 62 ML/MIN/1.73M2 — SIGNIFICANT CHANGE UP
EGFR: 62 ML/MIN/1.73M2 — SIGNIFICANT CHANGE UP
EGFR: 63 ML/MIN/1.73M2 — SIGNIFICANT CHANGE UP
EGFR: 63 ML/MIN/1.73M2 — SIGNIFICANT CHANGE UP
EGFR: 64 ML/MIN/1.73M2 — SIGNIFICANT CHANGE UP
EGFR: 65 ML/MIN/1.73M2 — SIGNIFICANT CHANGE UP
EGFR: 65 ML/MIN/1.73M2 — SIGNIFICANT CHANGE UP
EGFR: 66 ML/MIN/1.73M2 — SIGNIFICANT CHANGE UP
EGFR: 67 ML/MIN/1.73M2 — SIGNIFICANT CHANGE UP
EGFR: 68 ML/MIN/1.73M2 — SIGNIFICANT CHANGE UP
EGFR: 69 ML/MIN/1.73M2 — SIGNIFICANT CHANGE UP
EGFR: 70 ML/MIN/1.73M2 — SIGNIFICANT CHANGE UP
EGFR: 70 ML/MIN/1.73M2 — SIGNIFICANT CHANGE UP
EGFR: 71 ML/MIN/1.73M2 — SIGNIFICANT CHANGE UP
EGFR: 71 ML/MIN/1.73M2 — SIGNIFICANT CHANGE UP
EGFR: 72 ML/MIN/1.73M2 — SIGNIFICANT CHANGE UP
EGFR: 73 ML/MIN/1.73M2 — SIGNIFICANT CHANGE UP
EGFR: 74 ML/MIN/1.73M2
EGFR: 74 ML/MIN/1.73M2 — SIGNIFICANT CHANGE UP
EGFR: 75 ML/MIN/1.73M2 — SIGNIFICANT CHANGE UP
EGFR: 76 ML/MIN/1.73M2 — SIGNIFICANT CHANGE UP
EGFR: 76 ML/MIN/1.73M2 — SIGNIFICANT CHANGE UP
EGFR: 77 ML/MIN/1.73M2 — SIGNIFICANT CHANGE UP
EGFR: 77 ML/MIN/1.73M2 — SIGNIFICANT CHANGE UP
EGFR: 78 ML/MIN/1.73M2 — SIGNIFICANT CHANGE UP
EGFR: 79 ML/MIN/1.73M2 — SIGNIFICANT CHANGE UP
EGFR: 80 ML/MIN/1.73M2 — SIGNIFICANT CHANGE UP
EGFR: 81 ML/MIN/1.73M2 — SIGNIFICANT CHANGE UP
EGFR: 83 ML/MIN/1.73M2 — SIGNIFICANT CHANGE UP
EGFR: 84 ML/MIN/1.73M2 — SIGNIFICANT CHANGE UP
EGFR: 85 ML/MIN/1.73M2 — SIGNIFICANT CHANGE UP
EGFR: 85 ML/MIN/1.73M2 — SIGNIFICANT CHANGE UP
EGFR: 86 ML/MIN/1.73M2 — SIGNIFICANT CHANGE UP
EGFR: 89 ML/MIN/1.73M2 — SIGNIFICANT CHANGE UP
EGFR: 94 ML/MIN/1.73M2
EGFR: 96 ML/MIN/1.73M2 — SIGNIFICANT CHANGE UP
EOSINOPHIL # BLD AUTO: 0.04 K/UL — SIGNIFICANT CHANGE UP (ref 0–0.5)
EOSINOPHIL # BLD AUTO: 0.05 K/UL — SIGNIFICANT CHANGE UP (ref 0–0.5)
EOSINOPHIL # BLD AUTO: 0.13 K/UL — SIGNIFICANT CHANGE UP (ref 0–0.5)
EOSINOPHIL # BLD AUTO: 0.14 K/UL — SIGNIFICANT CHANGE UP (ref 0–0.5)
EOSINOPHIL # BLD AUTO: 0.15 K/UL — SIGNIFICANT CHANGE UP (ref 0–0.5)
EOSINOPHIL # BLD AUTO: 0.18 K/UL — SIGNIFICANT CHANGE UP (ref 0–0.5)
EOSINOPHIL # BLD AUTO: 0.19 K/UL
EOSINOPHIL # BLD AUTO: 0.19 K/UL — SIGNIFICANT CHANGE UP (ref 0–0.5)
EOSINOPHIL # BLD AUTO: 0.22 K/UL — SIGNIFICANT CHANGE UP (ref 0–0.5)
EOSINOPHIL # BLD AUTO: 0.26 K/UL — SIGNIFICANT CHANGE UP (ref 0–0.5)
EOSINOPHIL # BLD AUTO: 0.28 K/UL — SIGNIFICANT CHANGE UP (ref 0–0.5)
EOSINOPHIL # BLD AUTO: 0.3 K/UL — SIGNIFICANT CHANGE UP (ref 0–0.5)
EOSINOPHIL # BLD AUTO: 0.35 K/UL — SIGNIFICANT CHANGE UP (ref 0–0.5)
EOSINOPHIL # BLD AUTO: 0.36 K/UL — SIGNIFICANT CHANGE UP (ref 0–0.5)
EOSINOPHIL # BLD AUTO: 0.4 K/UL — SIGNIFICANT CHANGE UP (ref 0–0.5)
EOSINOPHIL # BLD AUTO: 0.4 K/UL — SIGNIFICANT CHANGE UP (ref 0–0.5)
EOSINOPHIL # BLD AUTO: 0.41 K/UL — SIGNIFICANT CHANGE UP (ref 0–0.5)
EOSINOPHIL # BLD AUTO: 0.42 K/UL — SIGNIFICANT CHANGE UP (ref 0–0.5)
EOSINOPHIL # BLD AUTO: 0.42 K/UL — SIGNIFICANT CHANGE UP (ref 0–0.5)
EOSINOPHIL # BLD AUTO: 0.43 K/UL — SIGNIFICANT CHANGE UP (ref 0–0.5)
EOSINOPHIL # BLD AUTO: 0.45 K/UL — SIGNIFICANT CHANGE UP (ref 0–0.5)
EOSINOPHIL # BLD AUTO: 0.46 K/UL — SIGNIFICANT CHANGE UP (ref 0–0.5)
EOSINOPHIL # BLD AUTO: 0.49 K/UL — SIGNIFICANT CHANGE UP (ref 0–0.5)
EOSINOPHIL # BLD AUTO: 0.51 K/UL — HIGH (ref 0–0.5)
EOSINOPHIL # BLD AUTO: 0.54 K/UL — HIGH (ref 0–0.5)
EOSINOPHIL # BLD AUTO: 0.59 K/UL — HIGH (ref 0–0.5)
EOSINOPHIL NFR BLD AUTO: 0.3 % — SIGNIFICANT CHANGE UP (ref 0–6)
EOSINOPHIL NFR BLD AUTO: 0.3 % — SIGNIFICANT CHANGE UP (ref 0–6)
EOSINOPHIL NFR BLD AUTO: 0.9 % — SIGNIFICANT CHANGE UP (ref 0–6)
EOSINOPHIL NFR BLD AUTO: 1.8 %
EOSINOPHIL NFR BLD AUTO: 2.4 % — SIGNIFICANT CHANGE UP (ref 0–6)
EOSINOPHIL NFR BLD AUTO: 2.6 % — SIGNIFICANT CHANGE UP (ref 0–6)
EOSINOPHIL NFR BLD AUTO: 2.7 % — SIGNIFICANT CHANGE UP (ref 0–6)
EOSINOPHIL NFR BLD AUTO: 2.8 % — SIGNIFICANT CHANGE UP (ref 0–6)
EOSINOPHIL NFR BLD AUTO: 3.8 % — SIGNIFICANT CHANGE UP (ref 0–6)
EOSINOPHIL NFR BLD AUTO: 4.2 % — SIGNIFICANT CHANGE UP (ref 0–6)
EOSINOPHIL NFR BLD AUTO: 4.6 % — SIGNIFICANT CHANGE UP (ref 0–6)
EOSINOPHIL NFR BLD AUTO: 4.7 % — SIGNIFICANT CHANGE UP (ref 0–6)
EOSINOPHIL NFR BLD AUTO: 4.8 % — SIGNIFICANT CHANGE UP (ref 0–6)
EOSINOPHIL NFR BLD AUTO: 4.9 % — SIGNIFICANT CHANGE UP (ref 0–6)
EOSINOPHIL NFR BLD AUTO: 5 % — SIGNIFICANT CHANGE UP (ref 0–6)
EOSINOPHIL NFR BLD AUTO: 5.1 % — SIGNIFICANT CHANGE UP (ref 0–6)
EOSINOPHIL NFR BLD AUTO: 5.1 % — SIGNIFICANT CHANGE UP (ref 0–6)
EOSINOPHIL NFR BLD AUTO: 5.3 % — SIGNIFICANT CHANGE UP (ref 0–6)
EOSINOPHIL NFR BLD AUTO: 5.6 % — SIGNIFICANT CHANGE UP (ref 0–6)
EOSINOPHIL NFR BLD AUTO: 5.7 % — SIGNIFICANT CHANGE UP (ref 0–6)
EOSINOPHIL NFR BLD AUTO: 5.9 % — SIGNIFICANT CHANGE UP (ref 0–6)
EOSINOPHIL NFR BLD AUTO: 6.6 % — HIGH (ref 0–6)
EOSINOPHIL NFR BLD AUTO: 6.6 % — HIGH (ref 0–6)
EOSINOPHIL NFR BLD AUTO: 6.8 % — HIGH (ref 0–6)
EPI CELLS # UR: ABNORMAL
EPI CELLS # UR: ABNORMAL /HPF
EPI CELLS # UR: NEGATIVE /HPF — SIGNIFICANT CHANGE UP
EPI CELLS # UR: SIGNIFICANT CHANGE UP /HPF
ESTIMATED AVERAGE GLUCOSE: 114 MG/DL — SIGNIFICANT CHANGE UP (ref 68–114)
ESTIMATED AVERAGE GLUCOSE: 117 MG/DL — HIGH (ref 68–114)
FERRITIN SERPL-MCNC: 120 NG/ML — SIGNIFICANT CHANGE UP (ref 15–150)
FERRITIN SERPL-MCNC: 809 NG/ML — HIGH (ref 15–150)
FLUAV SUBTYP SPEC NAA+PROBE: SIGNIFICANT CHANGE UP
FLUBV RNA SPEC QL NAA+PROBE: SIGNIFICANT CHANGE UP
FOLATE SERPL-MCNC: 7.6 NG/ML — SIGNIFICANT CHANGE UP
GAMMA GLOBULIN: 1.3 G/DL — SIGNIFICANT CHANGE UP (ref 0.6–1.6)
GLUCOSE BLDC GLUCOMTR-MCNC: 100 MG/DL — HIGH (ref 70–99)
GLUCOSE BLDC GLUCOMTR-MCNC: 101 MG/DL — HIGH (ref 70–99)
GLUCOSE BLDC GLUCOMTR-MCNC: 102 MG/DL — HIGH (ref 70–99)
GLUCOSE BLDC GLUCOMTR-MCNC: 103 MG/DL — HIGH (ref 70–99)
GLUCOSE BLDC GLUCOMTR-MCNC: 104 MG/DL — HIGH (ref 70–99)
GLUCOSE BLDC GLUCOMTR-MCNC: 104 MG/DL — HIGH (ref 70–99)
GLUCOSE BLDC GLUCOMTR-MCNC: 105 MG/DL — HIGH (ref 70–99)
GLUCOSE BLDC GLUCOMTR-MCNC: 105 MG/DL — HIGH (ref 70–99)
GLUCOSE BLDC GLUCOMTR-MCNC: 106 MG/DL — HIGH (ref 70–99)
GLUCOSE BLDC GLUCOMTR-MCNC: 106 MG/DL — HIGH (ref 70–99)
GLUCOSE BLDC GLUCOMTR-MCNC: 107 MG/DL — HIGH (ref 70–99)
GLUCOSE BLDC GLUCOMTR-MCNC: 108 MG/DL — HIGH (ref 70–99)
GLUCOSE BLDC GLUCOMTR-MCNC: 109 MG/DL — HIGH (ref 70–99)
GLUCOSE BLDC GLUCOMTR-MCNC: 110 MG/DL — HIGH (ref 70–99)
GLUCOSE BLDC GLUCOMTR-MCNC: 111 MG/DL — HIGH (ref 70–99)
GLUCOSE BLDC GLUCOMTR-MCNC: 111 MG/DL — HIGH (ref 70–99)
GLUCOSE BLDC GLUCOMTR-MCNC: 112 MG/DL — HIGH (ref 70–99)
GLUCOSE BLDC GLUCOMTR-MCNC: 112 MG/DL — HIGH (ref 70–99)
GLUCOSE BLDC GLUCOMTR-MCNC: 115 MG/DL — HIGH (ref 70–99)
GLUCOSE BLDC GLUCOMTR-MCNC: 117 MG/DL — HIGH (ref 70–99)
GLUCOSE BLDC GLUCOMTR-MCNC: 121 MG/DL — HIGH (ref 70–99)
GLUCOSE BLDC GLUCOMTR-MCNC: 130 MG/DL — HIGH (ref 70–99)
GLUCOSE BLDC GLUCOMTR-MCNC: 131 MG/DL — HIGH (ref 70–99)
GLUCOSE BLDC GLUCOMTR-MCNC: 134 MG/DL — HIGH (ref 70–99)
GLUCOSE BLDC GLUCOMTR-MCNC: 136 MG/DL — HIGH (ref 70–99)
GLUCOSE BLDC GLUCOMTR-MCNC: 136 MG/DL — HIGH (ref 70–99)
GLUCOSE BLDC GLUCOMTR-MCNC: 150 MG/DL — HIGH (ref 70–99)
GLUCOSE BLDC GLUCOMTR-MCNC: 153 MG/DL — HIGH (ref 70–99)
GLUCOSE BLDC GLUCOMTR-MCNC: 159 MG/DL — HIGH (ref 70–99)
GLUCOSE BLDC GLUCOMTR-MCNC: 160 MG/DL — HIGH (ref 70–99)
GLUCOSE BLDC GLUCOMTR-MCNC: 161 MG/DL — HIGH (ref 70–99)
GLUCOSE BLDC GLUCOMTR-MCNC: 162 MG/DL — HIGH (ref 70–99)
GLUCOSE BLDC GLUCOMTR-MCNC: 166 MG/DL — HIGH (ref 70–99)
GLUCOSE BLDC GLUCOMTR-MCNC: 206 MG/DL — HIGH (ref 70–99)
GLUCOSE BLDC GLUCOMTR-MCNC: 233 MG/DL — HIGH (ref 70–99)
GLUCOSE BLDC GLUCOMTR-MCNC: 292 MG/DL — HIGH (ref 70–99)
GLUCOSE BLDC GLUCOMTR-MCNC: 53 MG/DL — CRITICAL LOW (ref 70–99)
GLUCOSE BLDC GLUCOMTR-MCNC: 54 MG/DL — CRITICAL LOW (ref 70–99)
GLUCOSE BLDC GLUCOMTR-MCNC: 55 MG/DL — LOW (ref 70–99)
GLUCOSE BLDC GLUCOMTR-MCNC: 57 MG/DL — LOW (ref 70–99)
GLUCOSE BLDC GLUCOMTR-MCNC: 59 MG/DL — LOW (ref 70–99)
GLUCOSE BLDC GLUCOMTR-MCNC: 60 MG/DL — LOW (ref 70–99)
GLUCOSE BLDC GLUCOMTR-MCNC: 60 MG/DL — LOW (ref 70–99)
GLUCOSE BLDC GLUCOMTR-MCNC: 61 MG/DL — LOW (ref 70–99)
GLUCOSE BLDC GLUCOMTR-MCNC: 61 MG/DL — LOW (ref 70–99)
GLUCOSE BLDC GLUCOMTR-MCNC: 63 MG/DL — LOW (ref 70–99)
GLUCOSE BLDC GLUCOMTR-MCNC: 63 MG/DL — LOW (ref 70–99)
GLUCOSE BLDC GLUCOMTR-MCNC: 64 MG/DL — LOW (ref 70–99)
GLUCOSE BLDC GLUCOMTR-MCNC: 66 MG/DL — LOW (ref 70–99)
GLUCOSE BLDC GLUCOMTR-MCNC: 67 MG/DL — LOW (ref 70–99)
GLUCOSE BLDC GLUCOMTR-MCNC: 69 MG/DL — LOW (ref 70–99)
GLUCOSE BLDC GLUCOMTR-MCNC: 72 MG/DL — SIGNIFICANT CHANGE UP (ref 70–99)
GLUCOSE BLDC GLUCOMTR-MCNC: 72 MG/DL — SIGNIFICANT CHANGE UP (ref 70–99)
GLUCOSE BLDC GLUCOMTR-MCNC: 73 MG/DL — SIGNIFICANT CHANGE UP (ref 70–99)
GLUCOSE BLDC GLUCOMTR-MCNC: 74 MG/DL — SIGNIFICANT CHANGE UP (ref 70–99)
GLUCOSE BLDC GLUCOMTR-MCNC: 75 MG/DL — SIGNIFICANT CHANGE UP (ref 70–99)
GLUCOSE BLDC GLUCOMTR-MCNC: 76 MG/DL — SIGNIFICANT CHANGE UP (ref 70–99)
GLUCOSE BLDC GLUCOMTR-MCNC: 77 MG/DL — SIGNIFICANT CHANGE UP (ref 70–99)
GLUCOSE BLDC GLUCOMTR-MCNC: 78 MG/DL — SIGNIFICANT CHANGE UP (ref 70–99)
GLUCOSE BLDC GLUCOMTR-MCNC: 79 MG/DL — SIGNIFICANT CHANGE UP (ref 70–99)
GLUCOSE BLDC GLUCOMTR-MCNC: 79 MG/DL — SIGNIFICANT CHANGE UP (ref 70–99)
GLUCOSE BLDC GLUCOMTR-MCNC: 80 MG/DL — SIGNIFICANT CHANGE UP (ref 70–99)
GLUCOSE BLDC GLUCOMTR-MCNC: 81 MG/DL — SIGNIFICANT CHANGE UP (ref 70–99)
GLUCOSE BLDC GLUCOMTR-MCNC: 82 MG/DL — SIGNIFICANT CHANGE UP (ref 70–99)
GLUCOSE BLDC GLUCOMTR-MCNC: 83 MG/DL — SIGNIFICANT CHANGE UP (ref 70–99)
GLUCOSE BLDC GLUCOMTR-MCNC: 84 MG/DL — SIGNIFICANT CHANGE UP (ref 70–99)
GLUCOSE BLDC GLUCOMTR-MCNC: 85 MG/DL — SIGNIFICANT CHANGE UP (ref 70–99)
GLUCOSE BLDC GLUCOMTR-MCNC: 86 MG/DL — SIGNIFICANT CHANGE UP (ref 70–99)
GLUCOSE BLDC GLUCOMTR-MCNC: 88 MG/DL — SIGNIFICANT CHANGE UP (ref 70–99)
GLUCOSE BLDC GLUCOMTR-MCNC: 89 MG/DL — SIGNIFICANT CHANGE UP (ref 70–99)
GLUCOSE BLDC GLUCOMTR-MCNC: 90 MG/DL — SIGNIFICANT CHANGE UP (ref 70–99)
GLUCOSE BLDC GLUCOMTR-MCNC: 91 MG/DL — SIGNIFICANT CHANGE UP (ref 70–99)
GLUCOSE BLDC GLUCOMTR-MCNC: 92 MG/DL — SIGNIFICANT CHANGE UP (ref 70–99)
GLUCOSE BLDC GLUCOMTR-MCNC: 93 MG/DL — SIGNIFICANT CHANGE UP (ref 70–99)
GLUCOSE BLDC GLUCOMTR-MCNC: 93 MG/DL — SIGNIFICANT CHANGE UP (ref 70–99)
GLUCOSE BLDC GLUCOMTR-MCNC: 94 MG/DL — SIGNIFICANT CHANGE UP (ref 70–99)
GLUCOSE BLDC GLUCOMTR-MCNC: 95 MG/DL — SIGNIFICANT CHANGE UP (ref 70–99)
GLUCOSE BLDC GLUCOMTR-MCNC: 96 MG/DL — SIGNIFICANT CHANGE UP (ref 70–99)
GLUCOSE BLDC GLUCOMTR-MCNC: 97 MG/DL — SIGNIFICANT CHANGE UP (ref 70–99)
GLUCOSE BLDC GLUCOMTR-MCNC: 98 MG/DL — SIGNIFICANT CHANGE UP (ref 70–99)
GLUCOSE BLDC GLUCOMTR-MCNC: 98 MG/DL — SIGNIFICANT CHANGE UP (ref 70–99)
GLUCOSE BLDC GLUCOMTR-MCNC: 99 MG/DL — SIGNIFICANT CHANGE UP (ref 70–99)
GLUCOSE P FAST SERPL-MCNC: 79 MG/DL — SIGNIFICANT CHANGE UP (ref 70–99)
GLUCOSE QUALITATIVE U: NEGATIVE
GLUCOSE QUALITATIVE U: NEGATIVE
GLUCOSE SERPL-MCNC: 100 MG/DL — HIGH (ref 70–99)
GLUCOSE SERPL-MCNC: 101 MG/DL — HIGH (ref 70–99)
GLUCOSE SERPL-MCNC: 102 MG/DL — HIGH (ref 70–99)
GLUCOSE SERPL-MCNC: 103 MG/DL — HIGH (ref 70–99)
GLUCOSE SERPL-MCNC: 104 MG/DL — HIGH (ref 70–99)
GLUCOSE SERPL-MCNC: 105 MG/DL
GLUCOSE SERPL-MCNC: 106 MG/DL — HIGH (ref 70–99)
GLUCOSE SERPL-MCNC: 107 MG/DL — HIGH (ref 70–99)
GLUCOSE SERPL-MCNC: 108 MG/DL — HIGH (ref 70–99)
GLUCOSE SERPL-MCNC: 111 MG/DL — HIGH (ref 70–99)
GLUCOSE SERPL-MCNC: 115 MG/DL — HIGH (ref 70–99)
GLUCOSE SERPL-MCNC: 123 MG/DL — HIGH (ref 70–99)
GLUCOSE SERPL-MCNC: 127 MG/DL — HIGH (ref 70–99)
GLUCOSE SERPL-MCNC: 129 MG/DL — HIGH (ref 70–99)
GLUCOSE SERPL-MCNC: 142 MG/DL — HIGH (ref 70–99)
GLUCOSE SERPL-MCNC: 155 MG/DL — HIGH (ref 70–99)
GLUCOSE SERPL-MCNC: 177 MG/DL — HIGH (ref 70–99)
GLUCOSE SERPL-MCNC: 70 MG/DL — SIGNIFICANT CHANGE UP (ref 70–99)
GLUCOSE SERPL-MCNC: 76 MG/DL
GLUCOSE SERPL-MCNC: 76 MG/DL — SIGNIFICANT CHANGE UP (ref 70–99)
GLUCOSE SERPL-MCNC: 77 MG/DL — SIGNIFICANT CHANGE UP (ref 70–99)
GLUCOSE SERPL-MCNC: 77 MG/DL — SIGNIFICANT CHANGE UP (ref 70–99)
GLUCOSE SERPL-MCNC: 78 MG/DL — SIGNIFICANT CHANGE UP (ref 70–99)
GLUCOSE SERPL-MCNC: 79 MG/DL — SIGNIFICANT CHANGE UP (ref 70–99)
GLUCOSE SERPL-MCNC: 80 MG/DL — SIGNIFICANT CHANGE UP (ref 70–99)
GLUCOSE SERPL-MCNC: 81 MG/DL — SIGNIFICANT CHANGE UP (ref 70–99)
GLUCOSE SERPL-MCNC: 82 MG/DL — SIGNIFICANT CHANGE UP (ref 70–99)
GLUCOSE SERPL-MCNC: 82 MG/DL — SIGNIFICANT CHANGE UP (ref 70–99)
GLUCOSE SERPL-MCNC: 83 MG/DL — SIGNIFICANT CHANGE UP (ref 70–99)
GLUCOSE SERPL-MCNC: 84 MG/DL — SIGNIFICANT CHANGE UP (ref 70–99)
GLUCOSE SERPL-MCNC: 85 MG/DL — SIGNIFICANT CHANGE UP (ref 70–99)
GLUCOSE SERPL-MCNC: 86 MG/DL — SIGNIFICANT CHANGE UP (ref 70–99)
GLUCOSE SERPL-MCNC: 87 MG/DL — SIGNIFICANT CHANGE UP (ref 70–99)
GLUCOSE SERPL-MCNC: 88 MG/DL — SIGNIFICANT CHANGE UP (ref 70–99)
GLUCOSE SERPL-MCNC: 90 MG/DL — SIGNIFICANT CHANGE UP (ref 70–99)
GLUCOSE SERPL-MCNC: 91 MG/DL — SIGNIFICANT CHANGE UP (ref 70–99)
GLUCOSE SERPL-MCNC: 91 MG/DL — SIGNIFICANT CHANGE UP (ref 70–99)
GLUCOSE SERPL-MCNC: 92 MG/DL — SIGNIFICANT CHANGE UP (ref 70–99)
GLUCOSE SERPL-MCNC: 92 MG/DL — SIGNIFICANT CHANGE UP (ref 70–99)
GLUCOSE SERPL-MCNC: 93 MG/DL — SIGNIFICANT CHANGE UP (ref 70–99)
GLUCOSE SERPL-MCNC: 94 MG/DL — SIGNIFICANT CHANGE UP (ref 70–99)
GLUCOSE SERPL-MCNC: 95 MG/DL — SIGNIFICANT CHANGE UP (ref 70–99)
GLUCOSE SERPL-MCNC: 95 MG/DL — SIGNIFICANT CHANGE UP (ref 70–99)
GLUCOSE SERPL-MCNC: 96 MG/DL — SIGNIFICANT CHANGE UP (ref 70–99)
GLUCOSE SERPL-MCNC: 97 MG/DL — SIGNIFICANT CHANGE UP (ref 70–99)
GLUCOSE SERPL-MCNC: 97 MG/DL — SIGNIFICANT CHANGE UP (ref 70–99)
GLUCOSE SERPL-MCNC: 98 MG/DL — SIGNIFICANT CHANGE UP (ref 70–99)
GLUCOSE UR QL: NEGATIVE — SIGNIFICANT CHANGE UP
GRAN CASTS # UR COMP ASSIST: ABNORMAL /LPF
HADV DNA SPEC QL NAA+PROBE: SIGNIFICANT CHANGE UP
HAPTOGLOB SERPL-MCNC: 382 MG/DL — HIGH (ref 34–200)
HCG SERPL-ACNC: 2 MIU/ML — SIGNIFICANT CHANGE UP
HCG SERPL-ACNC: 3 MIU/ML — SIGNIFICANT CHANGE UP
HCG SERPL-ACNC: 4 MIU/ML — SIGNIFICANT CHANGE UP
HCG SERPL-ACNC: 4 MIU/ML — SIGNIFICANT CHANGE UP
HCG SERPL-ACNC: 5 MIU/ML — SIGNIFICANT CHANGE UP
HCG SERPL-ACNC: 5 MIU/ML — SIGNIFICANT CHANGE UP
HCG SERPL-ACNC: 6 MIU/ML — SIGNIFICANT CHANGE UP
HCG UR QL: NEGATIVE — SIGNIFICANT CHANGE UP
HCG UR QL: NEGATIVE — SIGNIFICANT CHANGE UP
HCOV 229E RNA SPEC QL NAA+PROBE: SIGNIFICANT CHANGE UP
HCOV HKU1 RNA SPEC QL NAA+PROBE: SIGNIFICANT CHANGE UP
HCOV NL63 RNA SPEC QL NAA+PROBE: SIGNIFICANT CHANGE UP
HCOV OC43 RNA SPEC QL NAA+PROBE: SIGNIFICANT CHANGE UP
HCT VFR BLD CALC: 23.3 % — LOW (ref 34.5–45)
HCT VFR BLD CALC: 24.2 % — LOW (ref 34.5–45)
HCT VFR BLD CALC: 25.2 % — LOW (ref 34.5–45)
HCT VFR BLD CALC: 25.3 % — LOW (ref 34.5–45)
HCT VFR BLD CALC: 25.4 % — LOW (ref 34.5–45)
HCT VFR BLD CALC: 25.5 % — LOW (ref 34.5–45)
HCT VFR BLD CALC: 25.5 % — LOW (ref 34.5–45)
HCT VFR BLD CALC: 25.7 % — LOW (ref 34.5–45)
HCT VFR BLD CALC: 25.8 % — LOW (ref 34.5–45)
HCT VFR BLD CALC: 26 % — LOW (ref 34.5–45)
HCT VFR BLD CALC: 26.1 % — LOW (ref 34.5–45)
HCT VFR BLD CALC: 26.2 % — LOW (ref 34.5–45)
HCT VFR BLD CALC: 26.5 % — LOW (ref 34.5–45)
HCT VFR BLD CALC: 26.5 % — LOW (ref 34.5–45)
HCT VFR BLD CALC: 26.7 % — LOW (ref 34.5–45)
HCT VFR BLD CALC: 26.9 % — LOW (ref 34.5–45)
HCT VFR BLD CALC: 27.1 % — LOW (ref 34.5–45)
HCT VFR BLD CALC: 27.3 % — LOW (ref 34.5–45)
HCT VFR BLD CALC: 27.4 % — LOW (ref 34.5–45)
HCT VFR BLD CALC: 27.8 % — LOW (ref 34.5–45)
HCT VFR BLD CALC: 28.2 % — LOW (ref 34.5–45)
HCT VFR BLD CALC: 28.4 % — LOW (ref 34.5–45)
HCT VFR BLD CALC: 28.4 % — LOW (ref 34.5–45)
HCT VFR BLD CALC: 28.5 %
HCT VFR BLD CALC: 28.6 % — LOW (ref 34.5–45)
HCT VFR BLD CALC: 29 % — LOW (ref 34.5–45)
HCT VFR BLD CALC: 29.1 % — LOW (ref 34.5–45)
HCT VFR BLD CALC: 29.2 % — LOW (ref 34.5–45)
HCT VFR BLD CALC: 29.3 % — LOW (ref 34.5–45)
HCT VFR BLD CALC: 29.5 % — LOW (ref 34.5–45)
HCT VFR BLD CALC: 29.6 % — LOW (ref 34.5–45)
HCT VFR BLD CALC: 29.7 % — LOW (ref 34.5–45)
HCT VFR BLD CALC: 30 % — LOW (ref 34.5–45)
HCT VFR BLD CALC: 30.1 % — LOW (ref 34.5–45)
HCT VFR BLD CALC: 30.1 % — LOW (ref 34.5–45)
HCT VFR BLD CALC: 30.2 % — LOW (ref 34.5–45)
HCT VFR BLD CALC: 30.3 % — LOW (ref 34.5–45)
HCT VFR BLD CALC: 30.4 % — LOW (ref 34.5–45)
HCT VFR BLD CALC: 30.4 % — LOW (ref 34.5–45)
HCT VFR BLD CALC: 30.5 % — LOW (ref 34.5–45)
HCT VFR BLD CALC: 30.5 % — LOW (ref 34.5–45)
HCT VFR BLD CALC: 30.6 % — LOW (ref 34.5–45)
HCT VFR BLD CALC: 30.7 % — LOW (ref 34.5–45)
HCT VFR BLD CALC: 30.8 % — LOW (ref 34.5–45)
HCT VFR BLD CALC: 30.8 % — LOW (ref 34.5–45)
HCT VFR BLD CALC: 30.9 % — LOW (ref 34.5–45)
HCT VFR BLD CALC: 31 % — LOW (ref 34.5–45)
HCT VFR BLD CALC: 31.1 % — LOW (ref 34.5–45)
HCT VFR BLD CALC: 31.1 % — LOW (ref 34.5–45)
HCT VFR BLD CALC: 31.2 % — LOW (ref 34.5–45)
HCT VFR BLD CALC: 31.6 % — LOW (ref 34.5–45)
HCT VFR BLD CALC: 32.1 % — LOW (ref 34.5–45)
HCT VFR BLD CALC: 32.1 % — LOW (ref 34.5–45)
HCT VFR BLD CALC: 32.2 % — LOW (ref 34.5–45)
HCT VFR BLD CALC: 32.2 % — LOW (ref 34.5–45)
HCT VFR BLD CALC: 32.3 % — LOW (ref 34.5–45)
HCT VFR BLD CALC: 32.5 % — LOW (ref 34.5–45)
HCT VFR BLD CALC: 32.6 % — LOW (ref 34.5–45)
HCT VFR BLD CALC: 32.6 % — LOW (ref 34.5–45)
HCT VFR BLD CALC: 32.7 % — LOW (ref 34.5–45)
HCT VFR BLD CALC: 32.8 % — LOW (ref 34.5–45)
HCT VFR BLD CALC: 33.6 % — LOW (ref 34.5–45)
HCT VFR BLD CALC: 33.8 % — LOW (ref 34.5–45)
HCT VFR BLD CALC: 34 % — LOW (ref 34.5–45)
HCT VFR BLD CALC: 34 % — LOW (ref 34.5–45)
HCT VFR BLD CALC: 34.3 % — LOW (ref 34.5–45)
HCT VFR BLD CALC: 34.4 % — LOW (ref 34.5–45)
HCT VFR BLD CALC: 34.5 % — SIGNIFICANT CHANGE UP (ref 34.5–45)
HCT VFR BLD CALC: 34.5 % — SIGNIFICANT CHANGE UP (ref 34.5–45)
HCT VFR BLD CALC: 34.6 % — SIGNIFICANT CHANGE UP (ref 34.5–45)
HCT VFR BLD CALC: 34.7 % — SIGNIFICANT CHANGE UP (ref 34.5–45)
HCT VFR BLD CALC: 34.9 % — SIGNIFICANT CHANGE UP (ref 34.5–45)
HCT VFR BLD CALC: 34.9 % — SIGNIFICANT CHANGE UP (ref 34.5–45)
HCT VFR BLD CALC: 35.6 % — SIGNIFICANT CHANGE UP (ref 34.5–45)
HCT VFR BLD CALC: 35.9 % — SIGNIFICANT CHANGE UP (ref 34.5–45)
HCT VFR BLD CALC: 36.1 % — SIGNIFICANT CHANGE UP (ref 34.5–45)
HCT VFR BLD CALC: 36.4 % — SIGNIFICANT CHANGE UP (ref 34.5–45)
HCT VFR BLD CALC: 36.5 % — SIGNIFICANT CHANGE UP (ref 34.5–45)
HCT VFR BLD CALC: 37.4 % — SIGNIFICANT CHANGE UP (ref 34.5–45)
HCT VFR BLD CALC: 37.6 % — SIGNIFICANT CHANGE UP (ref 34.5–45)
HCT VFR BLD CALC: 38.3 % — SIGNIFICANT CHANGE UP (ref 34.5–45)
HCT VFR BLD CALC: 38.5 % — SIGNIFICANT CHANGE UP (ref 34.5–45)
HCT VFR BLD CALC: 43.1 % — SIGNIFICANT CHANGE UP (ref 34.5–45)
HDLC SERPL-MCNC: 55 MG/DL — SIGNIFICANT CHANGE UP
HGB BLD-MCNC: 10 G/DL — LOW (ref 11.5–15.5)
HGB BLD-MCNC: 10.1 G/DL — LOW (ref 11.5–15.5)
HGB BLD-MCNC: 10.1 G/DL — LOW (ref 11.5–15.5)
HGB BLD-MCNC: 10.2 G/DL — LOW (ref 11.5–15.5)
HGB BLD-MCNC: 10.3 G/DL — LOW (ref 11.5–15.5)
HGB BLD-MCNC: 10.5 G/DL — LOW (ref 11.5–15.5)
HGB BLD-MCNC: 10.5 G/DL — LOW (ref 11.5–15.5)
HGB BLD-MCNC: 10.6 G/DL — LOW (ref 11.5–15.5)
HGB BLD-MCNC: 10.7 G/DL — LOW (ref 11.5–15.5)
HGB BLD-MCNC: 10.8 G/DL — LOW (ref 11.5–15.5)
HGB BLD-MCNC: 10.9 G/DL — LOW (ref 11.5–15.5)
HGB BLD-MCNC: 10.9 G/DL — LOW (ref 11.5–15.5)
HGB BLD-MCNC: 11 G/DL — LOW (ref 11.5–15.5)
HGB BLD-MCNC: 11.1 G/DL — LOW (ref 11.5–15.5)
HGB BLD-MCNC: 11.1 G/DL — LOW (ref 11.5–15.5)
HGB BLD-MCNC: 11.2 G/DL — LOW (ref 11.5–15.5)
HGB BLD-MCNC: 11.4 G/DL — LOW (ref 11.5–15.5)
HGB BLD-MCNC: 11.4 G/DL — LOW (ref 11.5–15.5)
HGB BLD-MCNC: 11.5 G/DL — SIGNIFICANT CHANGE UP (ref 11.5–15.5)
HGB BLD-MCNC: 11.6 G/DL — SIGNIFICANT CHANGE UP (ref 11.5–15.5)
HGB BLD-MCNC: 11.6 G/DL — SIGNIFICANT CHANGE UP (ref 11.5–15.5)
HGB BLD-MCNC: 11.7 G/DL — SIGNIFICANT CHANGE UP (ref 11.5–15.5)
HGB BLD-MCNC: 11.7 G/DL — SIGNIFICANT CHANGE UP (ref 11.5–15.5)
HGB BLD-MCNC: 11.8 G/DL — SIGNIFICANT CHANGE UP (ref 11.5–15.5)
HGB BLD-MCNC: 11.9 G/DL — SIGNIFICANT CHANGE UP (ref 11.5–15.5)
HGB BLD-MCNC: 12.2 G/DL — SIGNIFICANT CHANGE UP (ref 11.5–15.5)
HGB BLD-MCNC: 12.3 G/DL — SIGNIFICANT CHANGE UP (ref 11.5–15.5)
HGB BLD-MCNC: 12.6 G/DL — SIGNIFICANT CHANGE UP (ref 11.5–15.5)
HGB BLD-MCNC: 13.9 G/DL — SIGNIFICANT CHANGE UP (ref 11.5–15.5)
HGB BLD-MCNC: 6.9 G/DL — CRITICAL LOW (ref 11.5–15.5)
HGB BLD-MCNC: 6.9 G/DL — CRITICAL LOW (ref 11.5–15.5)
HGB BLD-MCNC: 7.3 G/DL — LOW (ref 11.5–15.5)
HGB BLD-MCNC: 7.3 G/DL — LOW (ref 11.5–15.5)
HGB BLD-MCNC: 7.5 G/DL — LOW (ref 11.5–15.5)
HGB BLD-MCNC: 7.6 G/DL — LOW (ref 11.5–15.5)
HGB BLD-MCNC: 7.6 G/DL — LOW (ref 11.5–15.5)
HGB BLD-MCNC: 7.7 G/DL — LOW (ref 11.5–15.5)
HGB BLD-MCNC: 7.8 G/DL — LOW (ref 11.5–15.5)
HGB BLD-MCNC: 7.9 G/DL — LOW (ref 11.5–15.5)
HGB BLD-MCNC: 8 G/DL — LOW (ref 11.5–15.5)
HGB BLD-MCNC: 8.1 G/DL — LOW (ref 11.5–15.5)
HGB BLD-MCNC: 8.2 G/DL — LOW (ref 11.5–15.5)
HGB BLD-MCNC: 8.3 G/DL — LOW (ref 11.5–15.5)
HGB BLD-MCNC: 8.3 G/DL — LOW (ref 11.5–15.5)
HGB BLD-MCNC: 8.4 G/DL
HGB BLD-MCNC: 8.4 G/DL — LOW (ref 11.5–15.5)
HGB BLD-MCNC: 8.5 G/DL — LOW (ref 11.5–15.5)
HGB BLD-MCNC: 8.6 G/DL — LOW (ref 11.5–15.5)
HGB BLD-MCNC: 8.6 G/DL — LOW (ref 11.5–15.5)
HGB BLD-MCNC: 8.7 G/DL — LOW (ref 11.5–15.5)
HGB BLD-MCNC: 8.8 G/DL — LOW (ref 11.5–15.5)
HGB BLD-MCNC: 8.9 G/DL — LOW (ref 11.5–15.5)
HGB BLD-MCNC: 9 G/DL — LOW (ref 11.5–15.5)
HGB BLD-MCNC: 9.1 G/DL — LOW (ref 11.5–15.5)
HGB BLD-MCNC: 9.1 G/DL — LOW (ref 11.5–15.5)
HGB BLD-MCNC: 9.2 G/DL — LOW (ref 11.5–15.5)
HGB BLD-MCNC: 9.3 G/DL — LOW (ref 11.5–15.5)
HGB BLD-MCNC: 9.4 G/DL — LOW (ref 11.5–15.5)
HGB BLD-MCNC: 9.4 G/DL — LOW (ref 11.5–15.5)
HGB BLD-MCNC: 9.5 G/DL — LOW (ref 11.5–15.5)
HGB BLD-MCNC: 9.6 G/DL — LOW (ref 11.5–15.5)
HGB BLD-MCNC: 9.7 G/DL — LOW (ref 11.5–15.5)
HGB BLD-MCNC: 9.8 G/DL — LOW (ref 11.5–15.5)
HGB BLD-MCNC: 9.9 G/DL — LOW (ref 11.5–15.5)
HGB BLD-MCNC: 9.9 G/DL — LOW (ref 11.5–15.5)
HMPV RNA SPEC QL NAA+PROBE: SIGNIFICANT CHANGE UP
HPIV1 RNA SPEC QL NAA+PROBE: SIGNIFICANT CHANGE UP
HPIV2 RNA SPEC QL NAA+PROBE: SIGNIFICANT CHANGE UP
HPIV3 RNA SPEC QL NAA+PROBE: SIGNIFICANT CHANGE UP
HPIV4 RNA SPEC QL NAA+PROBE: SIGNIFICANT CHANGE UP
HYALINE CASTS # UR AUTO: ABNORMAL /LPF
HYALINE CASTS: 2 /LPF
IANC: 5.11 K/UL — SIGNIFICANT CHANGE UP (ref 1.8–7.4)
IANC: 5.26 K/UL — SIGNIFICANT CHANGE UP (ref 1.8–7.4)
IANC: 5.41 K/UL — SIGNIFICANT CHANGE UP (ref 1.8–7.4)
IANC: 5.74 K/UL — SIGNIFICANT CHANGE UP (ref 1.8–7.4)
IANC: 6.27 K/UL — SIGNIFICANT CHANGE UP (ref 1.8–7.4)
IANC: 6.86 K/UL — SIGNIFICANT CHANGE UP (ref 1.8–7.4)
IANC: 7.1 K/UL — SIGNIFICANT CHANGE UP (ref 1.8–7.4)
IANC: 8.1 K/UL — HIGH (ref 1.8–7.4)
IGA FLD-MCNC: 305 MG/DL — SIGNIFICANT CHANGE UP (ref 84–499)
IGF BP1 SERPL-MCNC: 190 NG/ML — SIGNIFICANT CHANGE UP (ref 65–216)
IGF BP2 SERPL-MCNC: 694 NG/ML — SIGNIFICANT CHANGE UP
IGG FLD-MCNC: 1472 MG/DL — SIGNIFICANT CHANGE UP (ref 610–1660)
IGM SERPL-MCNC: 97 MG/DL — SIGNIFICANT CHANGE UP (ref 35–242)
IMM GRANULOCYTES NFR BLD AUTO: 0.1 % — SIGNIFICANT CHANGE UP (ref 0–0.9)
IMM GRANULOCYTES NFR BLD AUTO: 0.1 % — SIGNIFICANT CHANGE UP (ref 0–1.5)
IMM GRANULOCYTES NFR BLD AUTO: 0.2 % — SIGNIFICANT CHANGE UP (ref 0–0.9)
IMM GRANULOCYTES NFR BLD AUTO: 0.2 % — SIGNIFICANT CHANGE UP (ref 0–1.5)
IMM GRANULOCYTES NFR BLD AUTO: 0.3 %
IMM GRANULOCYTES NFR BLD AUTO: 0.3 % — SIGNIFICANT CHANGE UP (ref 0–0.9)
IMM GRANULOCYTES NFR BLD AUTO: 0.3 % — SIGNIFICANT CHANGE UP (ref 0–1.5)
IMM GRANULOCYTES NFR BLD AUTO: 0.4 % — SIGNIFICANT CHANGE UP (ref 0–0.9)
IMM GRANULOCYTES NFR BLD AUTO: 0.4 % — SIGNIFICANT CHANGE UP (ref 0–1.5)
IMM GRANULOCYTES NFR BLD AUTO: 0.5 % — SIGNIFICANT CHANGE UP (ref 0–0.9)
IMM GRANULOCYTES NFR BLD AUTO: 0.6 % — SIGNIFICANT CHANGE UP (ref 0–0.9)
IMM GRANULOCYTES NFR BLD AUTO: 0.7 % — SIGNIFICANT CHANGE UP (ref 0–0.9)
INR BLD: 1.02 RATIO — SIGNIFICANT CHANGE UP (ref 0.88–1.16)
INR BLD: 1.06 RATIO — SIGNIFICANT CHANGE UP (ref 0.88–1.16)
INR BLD: 1.1 RATIO — SIGNIFICANT CHANGE UP (ref 0.88–1.16)
INR BLD: 1.1 RATIO — SIGNIFICANT CHANGE UP (ref 0.88–1.16)
INR BLD: 1.14 RATIO — SIGNIFICANT CHANGE UP (ref 0.88–1.16)
INR BLD: 1.15 RATIO — SIGNIFICANT CHANGE UP (ref 0.88–1.16)
INR BLD: 1.2 RATIO — HIGH (ref 0.88–1.16)
INR BLD: 1.2 RATIO — HIGH (ref 0.88–1.16)
INR BLD: 1.27 RATIO — HIGH (ref 0.88–1.16)
INR BLD: 1.29 RATIO — HIGH (ref 0.88–1.16)
INR PPP: 1.27 RATIO
INSULIN ANTIBODIES: <5 UU/ML — SIGNIFICANT CHANGE UP
INSULIN SERPL-MCNC: 16.9 UU/ML — SIGNIFICANT CHANGE UP (ref 2.6–24.9)
INSULIN SERPL-MCNC: 3.9 UU/ML — SIGNIFICANT CHANGE UP (ref 2.6–24.9)
INSULIN SERPL-MCNC: 49.2 UU/ML — HIGH (ref 2.6–24.9)
INSULIN SERPL-MCNC: 7.3 UU/ML — SIGNIFICANT CHANGE UP (ref 2.6–24.9)
INTERPRETATION SERPL IFE-IMP: SIGNIFICANT CHANGE UP
IRON SATN MFR SERPL: 14 UG/DL — LOW (ref 40–150)
IRON SATN MFR SERPL: 16 UG/DL — LOW (ref 40–150)
IRON SATN MFR SERPL: 6 % — LOW (ref 15–50)
IRON SATN MFR SERPL: 9 % — LOW (ref 15–50)
KAPPA LC SER QL IFE: 7.99 MG/DL — HIGH (ref 0.33–1.94)
KAPPA/LAMBDA FREE LIGHT CHAIN RATIO, SERUM: 1.87 RATIO — HIGH (ref 0.26–1.65)
KETONES UR-MCNC: ABNORMAL
KETONES UR-MCNC: NEGATIVE — SIGNIFICANT CHANGE UP
KETONES URINE: NEGATIVE
KETONES URINE: NEGATIVE
LACTATE SERPL-SCNC: 1.5 MMOL/L — SIGNIFICANT CHANGE UP (ref 0.7–2)
LAMBDA LC SER QL IFE: 4.28 MG/DL — HIGH (ref 0.57–2.63)
LEUKOCYTE ESTERASE UR-ACNC: ABNORMAL
LEUKOCYTE ESTERASE UR-ACNC: NEGATIVE — SIGNIFICANT CHANGE UP
LEUKOCYTE ESTERASE UR-ACNC: NEGATIVE — SIGNIFICANT CHANGE UP
LEUKOCYTE ESTERASE URINE: ABNORMAL
LEUKOCYTE ESTERASE URINE: NEGATIVE
LIDOCAIN IGE QN: 149 U/L — SIGNIFICANT CHANGE UP (ref 73–393)
LIDOCAIN IGE QN: 63 U/L — LOW (ref 73–393)
LIDOCAIN IGE QN: 86 U/L — SIGNIFICANT CHANGE UP (ref 73–393)
LIDOCAIN IGE QN: 98 U/L — SIGNIFICANT CHANGE UP (ref 73–393)
LIPID PNL WITH DIRECT LDL SERPL: 118 MG/DL — HIGH
LYMPHOCYTES # BLD AUTO: 1.18 K/UL — SIGNIFICANT CHANGE UP (ref 1–3.3)
LYMPHOCYTES # BLD AUTO: 1.27 K/UL — SIGNIFICANT CHANGE UP (ref 1–3.3)
LYMPHOCYTES # BLD AUTO: 1.28 K/UL — SIGNIFICANT CHANGE UP (ref 1–3.3)
LYMPHOCYTES # BLD AUTO: 1.33 K/UL — SIGNIFICANT CHANGE UP (ref 1–3.3)
LYMPHOCYTES # BLD AUTO: 1.36 K/UL
LYMPHOCYTES # BLD AUTO: 1.48 K/UL — SIGNIFICANT CHANGE UP (ref 1–3.3)
LYMPHOCYTES # BLD AUTO: 1.49 K/UL — SIGNIFICANT CHANGE UP (ref 1–3.3)
LYMPHOCYTES # BLD AUTO: 1.55 K/UL — SIGNIFICANT CHANGE UP (ref 1–3.3)
LYMPHOCYTES # BLD AUTO: 1.57 K/UL — SIGNIFICANT CHANGE UP (ref 1–3.3)
LYMPHOCYTES # BLD AUTO: 1.62 K/UL — SIGNIFICANT CHANGE UP (ref 1–3.3)
LYMPHOCYTES # BLD AUTO: 1.63 K/UL — SIGNIFICANT CHANGE UP (ref 1–3.3)
LYMPHOCYTES # BLD AUTO: 1.64 K/UL — SIGNIFICANT CHANGE UP (ref 1–3.3)
LYMPHOCYTES # BLD AUTO: 1.7 K/UL — SIGNIFICANT CHANGE UP (ref 1–3.3)
LYMPHOCYTES # BLD AUTO: 1.72 K/UL — SIGNIFICANT CHANGE UP (ref 1–3.3)
LYMPHOCYTES # BLD AUTO: 1.78 K/UL — SIGNIFICANT CHANGE UP (ref 1–3.3)
LYMPHOCYTES # BLD AUTO: 1.81 K/UL — SIGNIFICANT CHANGE UP (ref 1–3.3)
LYMPHOCYTES # BLD AUTO: 1.81 K/UL — SIGNIFICANT CHANGE UP (ref 1–3.3)
LYMPHOCYTES # BLD AUTO: 1.9 K/UL — SIGNIFICANT CHANGE UP (ref 1–3.3)
LYMPHOCYTES # BLD AUTO: 1.94 K/UL — SIGNIFICANT CHANGE UP (ref 1–3.3)
LYMPHOCYTES # BLD AUTO: 1.94 K/UL — SIGNIFICANT CHANGE UP (ref 1–3.3)
LYMPHOCYTES # BLD AUTO: 10.8 % — LOW (ref 13–44)
LYMPHOCYTES # BLD AUTO: 12.6 % — LOW (ref 13–44)
LYMPHOCYTES # BLD AUTO: 14.6 % — SIGNIFICANT CHANGE UP (ref 13–44)
LYMPHOCYTES # BLD AUTO: 14.9 % — SIGNIFICANT CHANGE UP (ref 13–44)
LYMPHOCYTES # BLD AUTO: 18.3 % — SIGNIFICANT CHANGE UP (ref 13–44)
LYMPHOCYTES # BLD AUTO: 18.6 % — SIGNIFICANT CHANGE UP (ref 13–44)
LYMPHOCYTES # BLD AUTO: 19.3 % — SIGNIFICANT CHANGE UP (ref 13–44)
LYMPHOCYTES # BLD AUTO: 2 K/UL — SIGNIFICANT CHANGE UP (ref 1–3.3)
LYMPHOCYTES # BLD AUTO: 2.05 K/UL — SIGNIFICANT CHANGE UP (ref 1–3.3)
LYMPHOCYTES # BLD AUTO: 2.08 K/UL — SIGNIFICANT CHANGE UP (ref 1–3.3)
LYMPHOCYTES # BLD AUTO: 2.16 K/UL — SIGNIFICANT CHANGE UP (ref 1–3.3)
LYMPHOCYTES # BLD AUTO: 2.22 K/UL — SIGNIFICANT CHANGE UP (ref 1–3.3)
LYMPHOCYTES # BLD AUTO: 2.28 K/UL — SIGNIFICANT CHANGE UP (ref 1–3.3)
LYMPHOCYTES # BLD AUTO: 20.1 % — SIGNIFICANT CHANGE UP (ref 13–44)
LYMPHOCYTES # BLD AUTO: 21.7 % — SIGNIFICANT CHANGE UP (ref 13–44)
LYMPHOCYTES # BLD AUTO: 21.7 % — SIGNIFICANT CHANGE UP (ref 13–44)
LYMPHOCYTES # BLD AUTO: 22.2 % — SIGNIFICANT CHANGE UP (ref 13–44)
LYMPHOCYTES # BLD AUTO: 22.8 % — SIGNIFICANT CHANGE UP (ref 13–44)
LYMPHOCYTES # BLD AUTO: 23 % — SIGNIFICANT CHANGE UP (ref 13–44)
LYMPHOCYTES # BLD AUTO: 25.6 % — SIGNIFICANT CHANGE UP (ref 13–44)
LYMPHOCYTES # BLD AUTO: 25.6 % — SIGNIFICANT CHANGE UP (ref 13–44)
LYMPHOCYTES # BLD AUTO: 25.7 % — SIGNIFICANT CHANGE UP (ref 13–44)
LYMPHOCYTES # BLD AUTO: 27.4 % — SIGNIFICANT CHANGE UP (ref 13–44)
LYMPHOCYTES # BLD AUTO: 28 % — SIGNIFICANT CHANGE UP (ref 13–44)
LYMPHOCYTES # BLD AUTO: 28.1 % — SIGNIFICANT CHANGE UP (ref 13–44)
LYMPHOCYTES # BLD AUTO: 28.5 % — SIGNIFICANT CHANGE UP (ref 13–44)
LYMPHOCYTES # BLD AUTO: 31.3 % — SIGNIFICANT CHANGE UP (ref 13–44)
LYMPHOCYTES # BLD AUTO: 32.8 % — SIGNIFICANT CHANGE UP (ref 13–44)
LYMPHOCYTES # BLD AUTO: 34.9 % — SIGNIFICANT CHANGE UP (ref 13–44)
LYMPHOCYTES # BLD AUTO: 8 % — LOW (ref 13–44)
LYMPHOCYTES # BLD AUTO: 8.8 % — LOW (ref 13–44)
LYMPHOCYTES NFR BLD AUTO: 12.8 %
M PNEUMO DNA SPEC QL NAA+PROBE: SIGNIFICANT CHANGE UP
M-SPIKE: SIGNIFICANT CHANGE UP (ref 0–0)
MAGNESIUM SERPL-MCNC: 1.8 MG/DL — SIGNIFICANT CHANGE UP (ref 1.6–2.6)
MAGNESIUM SERPL-MCNC: 2 MG/DL — SIGNIFICANT CHANGE UP (ref 1.6–2.6)
MAGNESIUM SERPL-MCNC: 2.1 MG/DL — SIGNIFICANT CHANGE UP (ref 1.6–2.6)
MAGNESIUM SERPL-MCNC: 2.2 MG/DL — SIGNIFICANT CHANGE UP (ref 1.6–2.6)
MAGNESIUM SERPL-MCNC: 2.3 MG/DL — SIGNIFICANT CHANGE UP (ref 1.6–2.6)
MAGNESIUM SERPL-MCNC: 2.3 MG/DL — SIGNIFICANT CHANGE UP (ref 1.6–2.6)
MAGNESIUM SERPL-MCNC: 2.4 MG/DL — SIGNIFICANT CHANGE UP (ref 1.6–2.6)
MAGNESIUM SERPL-MCNC: 2.5 MG/DL — SIGNIFICANT CHANGE UP (ref 1.6–2.6)
MAGNESIUM SERPL-MCNC: 2.6 MG/DL — SIGNIFICANT CHANGE UP (ref 1.6–2.6)
MAGNESIUM SERPL-MCNC: 2.6 MG/DL — SIGNIFICANT CHANGE UP (ref 1.6–2.6)
MAN DIFF?: NORMAL
MCHC RBC-ENTMCNC: 24 PG — LOW (ref 27–34)
MCHC RBC-ENTMCNC: 24 PG — LOW (ref 27–34)
MCHC RBC-ENTMCNC: 24.1 PG — LOW (ref 27–34)
MCHC RBC-ENTMCNC: 24.2 PG — LOW (ref 27–34)
MCHC RBC-ENTMCNC: 24.3 PG — LOW (ref 27–34)
MCHC RBC-ENTMCNC: 24.5 PG — LOW (ref 27–34)
MCHC RBC-ENTMCNC: 24.5 PG — LOW (ref 27–34)
MCHC RBC-ENTMCNC: 24.6 PG — LOW (ref 27–34)
MCHC RBC-ENTMCNC: 24.7 PG — LOW (ref 27–34)
MCHC RBC-ENTMCNC: 24.7 PG — LOW (ref 27–34)
MCHC RBC-ENTMCNC: 24.8 PG — LOW (ref 27–34)
MCHC RBC-ENTMCNC: 24.8 PG — LOW (ref 27–34)
MCHC RBC-ENTMCNC: 24.9 PG
MCHC RBC-ENTMCNC: 24.9 PG — LOW (ref 27–34)
MCHC RBC-ENTMCNC: 25.1 PG — LOW (ref 27–34)
MCHC RBC-ENTMCNC: 25.1 PG — LOW (ref 27–34)
MCHC RBC-ENTMCNC: 25.2 PG — LOW (ref 27–34)
MCHC RBC-ENTMCNC: 25.3 PG — LOW (ref 27–34)
MCHC RBC-ENTMCNC: 25.3 PG — LOW (ref 27–34)
MCHC RBC-ENTMCNC: 25.4 PG — LOW (ref 27–34)
MCHC RBC-ENTMCNC: 25.6 PG — LOW (ref 27–34)
MCHC RBC-ENTMCNC: 25.7 PG — LOW (ref 27–34)
MCHC RBC-ENTMCNC: 25.7 PG — LOW (ref 27–34)
MCHC RBC-ENTMCNC: 25.8 PG — LOW (ref 27–34)
MCHC RBC-ENTMCNC: 25.8 PG — LOW (ref 27–34)
MCHC RBC-ENTMCNC: 25.9 PG — LOW (ref 27–34)
MCHC RBC-ENTMCNC: 26 PG — LOW (ref 27–34)
MCHC RBC-ENTMCNC: 26.1 PG — LOW (ref 27–34)
MCHC RBC-ENTMCNC: 26.1 PG — LOW (ref 27–34)
MCHC RBC-ENTMCNC: 26.2 PG — LOW (ref 27–34)
MCHC RBC-ENTMCNC: 26.3 PG — LOW (ref 27–34)
MCHC RBC-ENTMCNC: 26.3 PG — LOW (ref 27–34)
MCHC RBC-ENTMCNC: 26.4 PG — LOW (ref 27–34)
MCHC RBC-ENTMCNC: 26.4 PG — LOW (ref 27–34)
MCHC RBC-ENTMCNC: 26.5 PG — LOW (ref 27–34)
MCHC RBC-ENTMCNC: 26.6 PG — LOW (ref 27–34)
MCHC RBC-ENTMCNC: 26.8 PG — LOW (ref 27–34)
MCHC RBC-ENTMCNC: 26.9 PG — LOW (ref 27–34)
MCHC RBC-ENTMCNC: 26.9 PG — LOW (ref 27–34)
MCHC RBC-ENTMCNC: 27 PG — SIGNIFICANT CHANGE UP (ref 27–34)
MCHC RBC-ENTMCNC: 27.1 PG — SIGNIFICANT CHANGE UP (ref 27–34)
MCHC RBC-ENTMCNC: 27.1 PG — SIGNIFICANT CHANGE UP (ref 27–34)
MCHC RBC-ENTMCNC: 27.2 PG — SIGNIFICANT CHANGE UP (ref 27–34)
MCHC RBC-ENTMCNC: 27.3 PG — SIGNIFICANT CHANGE UP (ref 27–34)
MCHC RBC-ENTMCNC: 27.4 PG — SIGNIFICANT CHANGE UP (ref 27–34)
MCHC RBC-ENTMCNC: 27.6 PG — SIGNIFICANT CHANGE UP (ref 27–34)
MCHC RBC-ENTMCNC: 27.7 PG — SIGNIFICANT CHANGE UP (ref 27–34)
MCHC RBC-ENTMCNC: 28.1 PG — SIGNIFICANT CHANGE UP (ref 27–34)
MCHC RBC-ENTMCNC: 28.3 GM/DL — LOW (ref 32–36)
MCHC RBC-ENTMCNC: 28.4 GM/DL — LOW (ref 32–36)
MCHC RBC-ENTMCNC: 28.5 GM/DL — LOW (ref 32–36)
MCHC RBC-ENTMCNC: 28.6 GM/DL — LOW (ref 32–36)
MCHC RBC-ENTMCNC: 28.7 GM/DL — LOW (ref 32–36)
MCHC RBC-ENTMCNC: 28.7 GM/DL — LOW (ref 32–36)
MCHC RBC-ENTMCNC: 28.7 PG — SIGNIFICANT CHANGE UP (ref 27–34)
MCHC RBC-ENTMCNC: 28.8 GM/DL — LOW (ref 32–36)
MCHC RBC-ENTMCNC: 28.8 GM/DL — LOW (ref 32–36)
MCHC RBC-ENTMCNC: 28.8 PG — SIGNIFICANT CHANGE UP (ref 27–34)
MCHC RBC-ENTMCNC: 28.8 PG — SIGNIFICANT CHANGE UP (ref 27–34)
MCHC RBC-ENTMCNC: 28.9 GM/DL — LOW (ref 32–36)
MCHC RBC-ENTMCNC: 28.9 GM/DL — LOW (ref 32–36)
MCHC RBC-ENTMCNC: 28.9 PG — SIGNIFICANT CHANGE UP (ref 27–34)
MCHC RBC-ENTMCNC: 28.9 PG — SIGNIFICANT CHANGE UP (ref 27–34)
MCHC RBC-ENTMCNC: 29 PG — SIGNIFICANT CHANGE UP (ref 27–34)
MCHC RBC-ENTMCNC: 29.1 GM/DL — LOW (ref 32–36)
MCHC RBC-ENTMCNC: 29.1 PG — SIGNIFICANT CHANGE UP (ref 27–34)
MCHC RBC-ENTMCNC: 29.2 GM/DL — LOW (ref 32–36)
MCHC RBC-ENTMCNC: 29.2 GM/DL — LOW (ref 32–36)
MCHC RBC-ENTMCNC: 29.2 PG — SIGNIFICANT CHANGE UP (ref 27–34)
MCHC RBC-ENTMCNC: 29.3 GM/DL — LOW (ref 32–36)
MCHC RBC-ENTMCNC: 29.3 PG — SIGNIFICANT CHANGE UP (ref 27–34)
MCHC RBC-ENTMCNC: 29.4 PG — SIGNIFICANT CHANGE UP (ref 27–34)
MCHC RBC-ENTMCNC: 29.5 GM/DL
MCHC RBC-ENTMCNC: 29.5 GM/DL — LOW (ref 32–36)
MCHC RBC-ENTMCNC: 29.6 GM/DL — LOW (ref 32–36)
MCHC RBC-ENTMCNC: 29.6 PG — SIGNIFICANT CHANGE UP (ref 27–34)
MCHC RBC-ENTMCNC: 29.7 GM/DL — LOW (ref 32–36)
MCHC RBC-ENTMCNC: 29.7 PG — SIGNIFICANT CHANGE UP (ref 27–34)
MCHC RBC-ENTMCNC: 29.8 GM/DL — LOW (ref 32–36)
MCHC RBC-ENTMCNC: 29.8 PG — SIGNIFICANT CHANGE UP (ref 27–34)
MCHC RBC-ENTMCNC: 29.9 GM/DL — LOW (ref 32–36)
MCHC RBC-ENTMCNC: 30 GM/DL — LOW (ref 32–36)
MCHC RBC-ENTMCNC: 30 PG — SIGNIFICANT CHANGE UP (ref 27–34)
MCHC RBC-ENTMCNC: 30.1 GM/DL — LOW (ref 32–36)
MCHC RBC-ENTMCNC: 30.1 PG — SIGNIFICANT CHANGE UP (ref 27–34)
MCHC RBC-ENTMCNC: 30.2 GM/DL — LOW (ref 32–36)
MCHC RBC-ENTMCNC: 30.2 PG — SIGNIFICANT CHANGE UP (ref 27–34)
MCHC RBC-ENTMCNC: 30.3 GM/DL — LOW (ref 32–36)
MCHC RBC-ENTMCNC: 30.3 PG — SIGNIFICANT CHANGE UP (ref 27–34)
MCHC RBC-ENTMCNC: 30.4 GM/DL — LOW (ref 32–36)
MCHC RBC-ENTMCNC: 30.4 GM/DL — LOW (ref 32–36)
MCHC RBC-ENTMCNC: 30.5 GM/DL — LOW (ref 32–36)
MCHC RBC-ENTMCNC: 30.5 GM/DL — LOW (ref 32–36)
MCHC RBC-ENTMCNC: 30.5 PG — SIGNIFICANT CHANGE UP (ref 27–34)
MCHC RBC-ENTMCNC: 30.6 GM/DL — LOW (ref 32–36)
MCHC RBC-ENTMCNC: 30.6 GM/DL — LOW (ref 32–36)
MCHC RBC-ENTMCNC: 30.8 GM/DL — LOW (ref 32–36)
MCHC RBC-ENTMCNC: 31 GM/DL — LOW (ref 32–36)
MCHC RBC-ENTMCNC: 31 GM/DL — LOW (ref 32–36)
MCHC RBC-ENTMCNC: 31 PG — SIGNIFICANT CHANGE UP (ref 27–34)
MCHC RBC-ENTMCNC: 31.1 GM/DL — LOW (ref 32–36)
MCHC RBC-ENTMCNC: 31.1 PG — SIGNIFICANT CHANGE UP (ref 27–34)
MCHC RBC-ENTMCNC: 31.3 G/DL — LOW (ref 32–36)
MCHC RBC-ENTMCNC: 31.3 GM/DL — LOW (ref 32–36)
MCHC RBC-ENTMCNC: 31.3 GM/DL — LOW (ref 32–36)
MCHC RBC-ENTMCNC: 31.4 GM/DL — LOW (ref 32–36)
MCHC RBC-ENTMCNC: 31.5 GM/DL — LOW (ref 32–36)
MCHC RBC-ENTMCNC: 31.6 GM/DL — LOW (ref 32–36)
MCHC RBC-ENTMCNC: 31.7 GM/DL — LOW (ref 32–36)
MCHC RBC-ENTMCNC: 31.8 GM/DL — LOW (ref 32–36)
MCHC RBC-ENTMCNC: 31.9 GM/DL — LOW (ref 32–36)
MCHC RBC-ENTMCNC: 32.1 GM/DL — SIGNIFICANT CHANGE UP (ref 32–36)
MCHC RBC-ENTMCNC: 32.2 GM/DL — SIGNIFICANT CHANGE UP (ref 32–36)
MCHC RBC-ENTMCNC: 32.3 GM/DL — SIGNIFICANT CHANGE UP (ref 32–36)
MCHC RBC-ENTMCNC: 32.3 GM/DL — SIGNIFICANT CHANGE UP (ref 32–36)
MCHC RBC-ENTMCNC: 32.4 GM/DL — SIGNIFICANT CHANGE UP (ref 32–36)
MCHC RBC-ENTMCNC: 32.4 GM/DL — SIGNIFICANT CHANGE UP (ref 32–36)
MCHC RBC-ENTMCNC: 32.5 GM/DL — SIGNIFICANT CHANGE UP (ref 32–36)
MCHC RBC-ENTMCNC: 32.6 GM/DL — SIGNIFICANT CHANGE UP (ref 32–36)
MCHC RBC-ENTMCNC: 32.7 GM/DL — SIGNIFICANT CHANGE UP (ref 32–36)
MCHC RBC-ENTMCNC: 32.8 GM/DL — SIGNIFICANT CHANGE UP (ref 32–36)
MCHC RBC-ENTMCNC: 32.9 GM/DL — SIGNIFICANT CHANGE UP (ref 32–36)
MCHC RBC-ENTMCNC: 33.1 GM/DL — SIGNIFICANT CHANGE UP (ref 32–36)
MCHC RBC-ENTMCNC: 33.2 GM/DL — SIGNIFICANT CHANGE UP (ref 32–36)
MCHC RBC-ENTMCNC: 33.5 GM/DL — SIGNIFICANT CHANGE UP (ref 32–36)
MCHC RBC-ENTMCNC: 33.8 GM/DL — SIGNIFICANT CHANGE UP (ref 32–36)
MCV RBC AUTO: 82.3 FL — SIGNIFICANT CHANGE UP (ref 80–100)
MCV RBC AUTO: 82.5 FL — SIGNIFICANT CHANGE UP (ref 80–100)
MCV RBC AUTO: 83.1 FL — SIGNIFICANT CHANGE UP (ref 80–100)
MCV RBC AUTO: 83.2 FL — SIGNIFICANT CHANGE UP (ref 80–100)
MCV RBC AUTO: 83.5 FL — SIGNIFICANT CHANGE UP (ref 80–100)
MCV RBC AUTO: 83.6 FL — SIGNIFICANT CHANGE UP (ref 80–100)
MCV RBC AUTO: 84 FL — SIGNIFICANT CHANGE UP (ref 80–100)
MCV RBC AUTO: 84.1 FL — SIGNIFICANT CHANGE UP (ref 80–100)
MCV RBC AUTO: 84.1 FL — SIGNIFICANT CHANGE UP (ref 80–100)
MCV RBC AUTO: 84.3 FL
MCV RBC AUTO: 84.3 FL — SIGNIFICANT CHANGE UP (ref 80–100)
MCV RBC AUTO: 84.4 FL — SIGNIFICANT CHANGE UP (ref 80–100)
MCV RBC AUTO: 84.5 FL — SIGNIFICANT CHANGE UP (ref 80–100)
MCV RBC AUTO: 84.5 FL — SIGNIFICANT CHANGE UP (ref 80–100)
MCV RBC AUTO: 84.8 FL — SIGNIFICANT CHANGE UP (ref 80–100)
MCV RBC AUTO: 84.9 FL — SIGNIFICANT CHANGE UP (ref 80–100)
MCV RBC AUTO: 84.9 FL — SIGNIFICANT CHANGE UP (ref 80–100)
MCV RBC AUTO: 85.2 FL — SIGNIFICANT CHANGE UP (ref 80–100)
MCV RBC AUTO: 85.3 FL — SIGNIFICANT CHANGE UP (ref 80–100)
MCV RBC AUTO: 85.7 FL — SIGNIFICANT CHANGE UP (ref 80–100)
MCV RBC AUTO: 85.8 FL — SIGNIFICANT CHANGE UP (ref 80–100)
MCV RBC AUTO: 86.2 FL — SIGNIFICANT CHANGE UP (ref 80–100)
MCV RBC AUTO: 86.3 FL — SIGNIFICANT CHANGE UP (ref 80–100)
MCV RBC AUTO: 86.5 FL — SIGNIFICANT CHANGE UP (ref 80–100)
MCV RBC AUTO: 86.8 FL — SIGNIFICANT CHANGE UP (ref 80–100)
MCV RBC AUTO: 86.8 FL — SIGNIFICANT CHANGE UP (ref 80–100)
MCV RBC AUTO: 86.9 FL — SIGNIFICANT CHANGE UP (ref 80–100)
MCV RBC AUTO: 87.4 FL — SIGNIFICANT CHANGE UP (ref 80–100)
MCV RBC AUTO: 87.4 FL — SIGNIFICANT CHANGE UP (ref 80–100)
MCV RBC AUTO: 87.5 FL — SIGNIFICANT CHANGE UP (ref 80–100)
MCV RBC AUTO: 87.6 FL — SIGNIFICANT CHANGE UP (ref 80–100)
MCV RBC AUTO: 87.7 FL — SIGNIFICANT CHANGE UP (ref 80–100)
MCV RBC AUTO: 87.8 FL — SIGNIFICANT CHANGE UP (ref 80–100)
MCV RBC AUTO: 87.8 FL — SIGNIFICANT CHANGE UP (ref 80–100)
MCV RBC AUTO: 87.9 FL — SIGNIFICANT CHANGE UP (ref 80–100)
MCV RBC AUTO: 88 FL — SIGNIFICANT CHANGE UP (ref 80–100)
MCV RBC AUTO: 88.3 FL — SIGNIFICANT CHANGE UP (ref 80–100)
MCV RBC AUTO: 88.4 FL — SIGNIFICANT CHANGE UP (ref 80–100)
MCV RBC AUTO: 88.4 FL — SIGNIFICANT CHANGE UP (ref 80–100)
MCV RBC AUTO: 88.5 FL — SIGNIFICANT CHANGE UP (ref 80–100)
MCV RBC AUTO: 88.5 FL — SIGNIFICANT CHANGE UP (ref 80–100)
MCV RBC AUTO: 88.6 FL — SIGNIFICANT CHANGE UP (ref 80–100)
MCV RBC AUTO: 88.6 FL — SIGNIFICANT CHANGE UP (ref 80–100)
MCV RBC AUTO: 88.8 FL — SIGNIFICANT CHANGE UP (ref 80–100)
MCV RBC AUTO: 88.9 FL — SIGNIFICANT CHANGE UP (ref 80–100)
MCV RBC AUTO: 89 FL — SIGNIFICANT CHANGE UP (ref 80–100)
MCV RBC AUTO: 89.3 FL — SIGNIFICANT CHANGE UP (ref 80–100)
MCV RBC AUTO: 89.4 FL — SIGNIFICANT CHANGE UP (ref 80–100)
MCV RBC AUTO: 89.4 FL — SIGNIFICANT CHANGE UP (ref 80–100)
MCV RBC AUTO: 89.5 FL — SIGNIFICANT CHANGE UP (ref 80–100)
MCV RBC AUTO: 89.6 FL — SIGNIFICANT CHANGE UP (ref 80–100)
MCV RBC AUTO: 89.8 FL — SIGNIFICANT CHANGE UP (ref 80–100)
MCV RBC AUTO: 89.9 FL — SIGNIFICANT CHANGE UP (ref 80–100)
MCV RBC AUTO: 90.2 FL — SIGNIFICANT CHANGE UP (ref 80–100)
MCV RBC AUTO: 90.3 FL — SIGNIFICANT CHANGE UP (ref 80–100)
MCV RBC AUTO: 90.7 FL — SIGNIFICANT CHANGE UP (ref 80–100)
MCV RBC AUTO: 90.8 FL — SIGNIFICANT CHANGE UP (ref 80–100)
MCV RBC AUTO: 90.9 FL — SIGNIFICANT CHANGE UP (ref 80–100)
MCV RBC AUTO: 91 FL — SIGNIFICANT CHANGE UP (ref 80–100)
MCV RBC AUTO: 91.1 FL — SIGNIFICANT CHANGE UP (ref 80–100)
MCV RBC AUTO: 91.2 FL — SIGNIFICANT CHANGE UP (ref 80–100)
MCV RBC AUTO: 91.2 FL — SIGNIFICANT CHANGE UP (ref 80–100)
MCV RBC AUTO: 91.4 FL — SIGNIFICANT CHANGE UP (ref 80–100)
MCV RBC AUTO: 91.4 FL — SIGNIFICANT CHANGE UP (ref 80–100)
MCV RBC AUTO: 91.5 FL — SIGNIFICANT CHANGE UP (ref 80–100)
MCV RBC AUTO: 91.6 FL — SIGNIFICANT CHANGE UP (ref 80–100)
MCV RBC AUTO: 91.8 FL — SIGNIFICANT CHANGE UP (ref 80–100)
MCV RBC AUTO: 92.2 FL — SIGNIFICANT CHANGE UP (ref 80–100)
MCV RBC AUTO: 92.3 FL — SIGNIFICANT CHANGE UP (ref 80–100)
MCV RBC AUTO: 92.4 FL — SIGNIFICANT CHANGE UP (ref 80–100)
MCV RBC AUTO: 92.5 FL — SIGNIFICANT CHANGE UP (ref 80–100)
MCV RBC AUTO: 92.9 FL — SIGNIFICANT CHANGE UP (ref 80–100)
MCV RBC AUTO: 93.1 FL — SIGNIFICANT CHANGE UP (ref 80–100)
MCV RBC AUTO: 93.4 FL — SIGNIFICANT CHANGE UP (ref 80–100)
MCV RBC AUTO: 93.6 FL — SIGNIFICANT CHANGE UP (ref 80–100)
MCV RBC AUTO: 93.7 FL — SIGNIFICANT CHANGE UP (ref 80–100)
MCV RBC AUTO: 94.8 FL — SIGNIFICANT CHANGE UP (ref 80–100)
MCV RBC AUTO: 96.6 FL — SIGNIFICANT CHANGE UP (ref 80–100)
METHOD TYPE: SIGNIFICANT CHANGE UP
MICROSCOPIC-UA: NORMAL
MICROSCOPIC-UA: NORMAL
MONOCYTES # BLD AUTO: 0.35 K/UL — SIGNIFICANT CHANGE UP (ref 0–0.9)
MONOCYTES # BLD AUTO: 0.42 K/UL — SIGNIFICANT CHANGE UP (ref 0–0.9)
MONOCYTES # BLD AUTO: 0.46 K/UL — SIGNIFICANT CHANGE UP (ref 0–0.9)
MONOCYTES # BLD AUTO: 0.49 K/UL — SIGNIFICANT CHANGE UP (ref 0–0.9)
MONOCYTES # BLD AUTO: 0.52 K/UL — SIGNIFICANT CHANGE UP (ref 0–0.9)
MONOCYTES # BLD AUTO: 0.54 K/UL — SIGNIFICANT CHANGE UP (ref 0–0.9)
MONOCYTES # BLD AUTO: 0.58 K/UL — SIGNIFICANT CHANGE UP (ref 0–0.9)
MONOCYTES # BLD AUTO: 0.59 K/UL — SIGNIFICANT CHANGE UP (ref 0–0.9)
MONOCYTES # BLD AUTO: 0.61 K/UL — SIGNIFICANT CHANGE UP (ref 0–0.9)
MONOCYTES # BLD AUTO: 0.65 K/UL — SIGNIFICANT CHANGE UP (ref 0–0.9)
MONOCYTES # BLD AUTO: 0.66 K/UL — SIGNIFICANT CHANGE UP (ref 0–0.9)
MONOCYTES # BLD AUTO: 0.67 K/UL — SIGNIFICANT CHANGE UP (ref 0–0.9)
MONOCYTES # BLD AUTO: 0.67 K/UL — SIGNIFICANT CHANGE UP (ref 0–0.9)
MONOCYTES # BLD AUTO: 0.68 K/UL — SIGNIFICANT CHANGE UP (ref 0–0.9)
MONOCYTES # BLD AUTO: 0.71 K/UL — SIGNIFICANT CHANGE UP (ref 0–0.9)
MONOCYTES # BLD AUTO: 0.71 K/UL — SIGNIFICANT CHANGE UP (ref 0–0.9)
MONOCYTES # BLD AUTO: 0.73 K/UL — SIGNIFICANT CHANGE UP (ref 0–0.9)
MONOCYTES # BLD AUTO: 0.77 K/UL — SIGNIFICANT CHANGE UP (ref 0–0.9)
MONOCYTES # BLD AUTO: 0.89 K/UL — SIGNIFICANT CHANGE UP (ref 0–0.9)
MONOCYTES # BLD AUTO: 0.95 K/UL — HIGH (ref 0–0.9)
MONOCYTES # BLD AUTO: 0.96 K/UL — HIGH (ref 0–0.9)
MONOCYTES # BLD AUTO: 0.97 K/UL
MONOCYTES # BLD AUTO: 1 K/UL — HIGH (ref 0–0.9)
MONOCYTES # BLD AUTO: 1.12 K/UL — HIGH (ref 0–0.9)
MONOCYTES NFR BLD AUTO: 11.4 % — SIGNIFICANT CHANGE UP (ref 2–14)
MONOCYTES NFR BLD AUTO: 12.7 % — SIGNIFICANT CHANGE UP (ref 2–14)
MONOCYTES NFR BLD AUTO: 14.1 % — HIGH (ref 2–14)
MONOCYTES NFR BLD AUTO: 5.6 % — SIGNIFICANT CHANGE UP (ref 2–14)
MONOCYTES NFR BLD AUTO: 6.1 % — SIGNIFICANT CHANGE UP (ref 2–14)
MONOCYTES NFR BLD AUTO: 6.2 % — SIGNIFICANT CHANGE UP (ref 2–14)
MONOCYTES NFR BLD AUTO: 6.3 % — SIGNIFICANT CHANGE UP (ref 2–14)
MONOCYTES NFR BLD AUTO: 6.4 % — SIGNIFICANT CHANGE UP (ref 2–14)
MONOCYTES NFR BLD AUTO: 6.5 % — SIGNIFICANT CHANGE UP (ref 2–14)
MONOCYTES NFR BLD AUTO: 6.9 % — SIGNIFICANT CHANGE UP (ref 2–14)
MONOCYTES NFR BLD AUTO: 7.2 % — SIGNIFICANT CHANGE UP (ref 2–14)
MONOCYTES NFR BLD AUTO: 7.2 % — SIGNIFICANT CHANGE UP (ref 2–14)
MONOCYTES NFR BLD AUTO: 7.3 % — SIGNIFICANT CHANGE UP (ref 2–14)
MONOCYTES NFR BLD AUTO: 7.6 % — SIGNIFICANT CHANGE UP (ref 2–14)
MONOCYTES NFR BLD AUTO: 7.7 % — SIGNIFICANT CHANGE UP (ref 2–14)
MONOCYTES NFR BLD AUTO: 7.9 % — SIGNIFICANT CHANGE UP (ref 2–14)
MONOCYTES NFR BLD AUTO: 8 % — SIGNIFICANT CHANGE UP (ref 2–14)
MONOCYTES NFR BLD AUTO: 8 % — SIGNIFICANT CHANGE UP (ref 2–14)
MONOCYTES NFR BLD AUTO: 8.4 % — SIGNIFICANT CHANGE UP (ref 2–14)
MONOCYTES NFR BLD AUTO: 8.7 % — SIGNIFICANT CHANGE UP (ref 2–14)
MONOCYTES NFR BLD AUTO: 8.9 % — SIGNIFICANT CHANGE UP (ref 2–14)
MONOCYTES NFR BLD AUTO: 9 % — SIGNIFICANT CHANGE UP (ref 2–14)
MONOCYTES NFR BLD AUTO: 9.1 %
MONOCYTES NFR BLD AUTO: 9.3 % — SIGNIFICANT CHANGE UP (ref 2–14)
MRSA PCR RESULT.: SIGNIFICANT CHANGE UP
NEUTROPHILS # BLD AUTO: 11.39 K/UL — HIGH (ref 1.8–7.4)
NEUTROPHILS # BLD AUTO: 11.86 K/UL — HIGH (ref 1.8–7.4)
NEUTROPHILS # BLD AUTO: 12.35 K/UL — HIGH (ref 1.8–7.4)
NEUTROPHILS # BLD AUTO: 2.68 K/UL — SIGNIFICANT CHANGE UP (ref 1.8–7.4)
NEUTROPHILS # BLD AUTO: 3.1 K/UL — SIGNIFICANT CHANGE UP (ref 1.8–7.4)
NEUTROPHILS # BLD AUTO: 3.22 K/UL — SIGNIFICANT CHANGE UP (ref 1.8–7.4)
NEUTROPHILS # BLD AUTO: 3.3 K/UL — SIGNIFICANT CHANGE UP (ref 1.8–7.4)
NEUTROPHILS # BLD AUTO: 3.6 K/UL — SIGNIFICANT CHANGE UP (ref 1.8–7.4)
NEUTROPHILS # BLD AUTO: 4.2 K/UL — SIGNIFICANT CHANGE UP (ref 1.8–7.4)
NEUTROPHILS # BLD AUTO: 4.55 K/UL — SIGNIFICANT CHANGE UP (ref 1.8–7.4)
NEUTROPHILS # BLD AUTO: 4.63 K/UL — SIGNIFICANT CHANGE UP (ref 1.8–7.4)
NEUTROPHILS # BLD AUTO: 4.74 K/UL — SIGNIFICANT CHANGE UP (ref 1.8–7.4)
NEUTROPHILS # BLD AUTO: 4.87 K/UL — SIGNIFICANT CHANGE UP (ref 1.8–7.4)
NEUTROPHILS # BLD AUTO: 4.96 K/UL — SIGNIFICANT CHANGE UP (ref 1.8–7.4)
NEUTROPHILS # BLD AUTO: 5.11 K/UL — SIGNIFICANT CHANGE UP (ref 1.8–7.4)
NEUTROPHILS # BLD AUTO: 5.13 K/UL — SIGNIFICANT CHANGE UP (ref 1.8–7.4)
NEUTROPHILS # BLD AUTO: 5.26 K/UL — SIGNIFICANT CHANGE UP (ref 1.8–7.4)
NEUTROPHILS # BLD AUTO: 5.39 K/UL — SIGNIFICANT CHANGE UP (ref 1.8–7.4)
NEUTROPHILS # BLD AUTO: 5.41 K/UL — SIGNIFICANT CHANGE UP (ref 1.8–7.4)
NEUTROPHILS # BLD AUTO: 5.74 K/UL — SIGNIFICANT CHANGE UP (ref 1.8–7.4)
NEUTROPHILS # BLD AUTO: 6.27 K/UL — SIGNIFICANT CHANGE UP (ref 1.8–7.4)
NEUTROPHILS # BLD AUTO: 6.86 K/UL — SIGNIFICANT CHANGE UP (ref 1.8–7.4)
NEUTROPHILS # BLD AUTO: 7.1 K/UL — SIGNIFICANT CHANGE UP (ref 1.8–7.4)
NEUTROPHILS # BLD AUTO: 7.67 K/UL — HIGH (ref 1.8–7.4)
NEUTROPHILS # BLD AUTO: 8.03 K/UL
NEUTROPHILS # BLD AUTO: 8.1 K/UL — HIGH (ref 1.8–7.4)
NEUTROPHILS NFR BLD AUTO: 49.6 % — SIGNIFICANT CHANGE UP (ref 43–77)
NEUTROPHILS NFR BLD AUTO: 52.5 % — SIGNIFICANT CHANGE UP (ref 43–77)
NEUTROPHILS NFR BLD AUTO: 54.3 % — SIGNIFICANT CHANGE UP (ref 43–77)
NEUTROPHILS NFR BLD AUTO: 55.9 % — SIGNIFICANT CHANGE UP (ref 43–77)
NEUTROPHILS NFR BLD AUTO: 57 % — SIGNIFICANT CHANGE UP (ref 43–77)
NEUTROPHILS NFR BLD AUTO: 58.8 % — SIGNIFICANT CHANGE UP (ref 43–77)
NEUTROPHILS NFR BLD AUTO: 59.1 % — SIGNIFICANT CHANGE UP (ref 43–77)
NEUTROPHILS NFR BLD AUTO: 61.1 % — SIGNIFICANT CHANGE UP (ref 43–77)
NEUTROPHILS NFR BLD AUTO: 61.2 % — SIGNIFICANT CHANGE UP (ref 43–77)
NEUTROPHILS NFR BLD AUTO: 61.6 % — SIGNIFICANT CHANGE UP (ref 43–77)
NEUTROPHILS NFR BLD AUTO: 62.9 % — SIGNIFICANT CHANGE UP (ref 43–77)
NEUTROPHILS NFR BLD AUTO: 63.1 % — SIGNIFICANT CHANGE UP (ref 43–77)
NEUTROPHILS NFR BLD AUTO: 64.1 % — SIGNIFICANT CHANGE UP (ref 43–77)
NEUTROPHILS NFR BLD AUTO: 64.4 % — SIGNIFICANT CHANGE UP (ref 43–77)
NEUTROPHILS NFR BLD AUTO: 66 % — SIGNIFICANT CHANGE UP (ref 43–77)
NEUTROPHILS NFR BLD AUTO: 66.9 % — SIGNIFICANT CHANGE UP (ref 43–77)
NEUTROPHILS NFR BLD AUTO: 67.6 % — SIGNIFICANT CHANGE UP (ref 43–77)
NEUTROPHILS NFR BLD AUTO: 69.2 % — SIGNIFICANT CHANGE UP (ref 43–77)
NEUTROPHILS NFR BLD AUTO: 70.3 % — SIGNIFICANT CHANGE UP (ref 43–77)
NEUTROPHILS NFR BLD AUTO: 72.2 % — SIGNIFICANT CHANGE UP (ref 43–77)
NEUTROPHILS NFR BLD AUTO: 72.7 % — SIGNIFICANT CHANGE UP (ref 43–77)
NEUTROPHILS NFR BLD AUTO: 75.7 %
NEUTROPHILS NFR BLD AUTO: 76.6 % — SIGNIFICANT CHANGE UP (ref 43–77)
NEUTROPHILS NFR BLD AUTO: 81.8 % — HIGH (ref 43–77)
NEUTROPHILS NFR BLD AUTO: 82.8 % — HIGH (ref 43–77)
NEUTROPHILS NFR BLD AUTO: 84.3 % — HIGH (ref 43–77)
NITRITE UR-MCNC: NEGATIVE — SIGNIFICANT CHANGE UP
NITRITE UR-MCNC: POSITIVE
NITRITE URINE: NEGATIVE
NITRITE URINE: POSITIVE
NON HDL CHOLESTEROL: 140 MG/DL — HIGH
NRBC # BLD: 0 /100 WBCS — SIGNIFICANT CHANGE UP (ref 0–0)
NRBC # FLD: 0 K/UL — SIGNIFICANT CHANGE UP (ref 0–0)
NT-PROBNP SERPL-SCNC: 184 PG/ML — HIGH (ref 0–125)
NT-PROBNP SERPL-SCNC: 202 PG/ML — HIGH (ref 0–125)
NT-PROBNP SERPL-SCNC: 39 PG/ML — SIGNIFICANT CHANGE UP (ref 0–125)
NT-PROBNP SERPL-SCNC: 47 PG/ML — SIGNIFICANT CHANGE UP (ref 0–125)
NT-PROBNP SERPL-SCNC: 55 PG/ML — SIGNIFICANT CHANGE UP (ref 0–125)
ORGANISM # SPEC MICROSCOPIC CNT: SIGNIFICANT CHANGE UP
OSMOLALITY SERPL: 286 MOSMOL/KG — SIGNIFICANT CHANGE UP (ref 275–300)
PH UR: 6 — SIGNIFICANT CHANGE UP (ref 5–8)
PH UR: 7 — SIGNIFICANT CHANGE UP (ref 5–8)
PH UR: 7.5 — SIGNIFICANT CHANGE UP (ref 5–8)
PH UR: 8 — SIGNIFICANT CHANGE UP (ref 5–8)
PH URINE: 7.5
PH URINE: 8
PHOSPHATE SERPL-MCNC: 2.6 MG/DL — SIGNIFICANT CHANGE UP (ref 2.5–4.5)
PHOSPHATE SERPL-MCNC: 2.9 MG/DL — SIGNIFICANT CHANGE UP (ref 2.5–4.5)
PHOSPHATE SERPL-MCNC: 3 MG/DL — SIGNIFICANT CHANGE UP (ref 2.5–4.5)
PHOSPHATE SERPL-MCNC: 3.1 MG/DL — SIGNIFICANT CHANGE UP (ref 2.5–4.5)
PHOSPHATE SERPL-MCNC: 3.1 MG/DL — SIGNIFICANT CHANGE UP (ref 2.5–4.5)
PHOSPHATE SERPL-MCNC: 3.2 MG/DL — SIGNIFICANT CHANGE UP (ref 2.5–4.5)
PHOSPHATE SERPL-MCNC: 3.2 MG/DL — SIGNIFICANT CHANGE UP (ref 2.5–4.5)
PHOSPHATE SERPL-MCNC: 3.3 MG/DL — SIGNIFICANT CHANGE UP (ref 2.5–4.5)
PHOSPHATE SERPL-MCNC: 3.4 MG/DL — SIGNIFICANT CHANGE UP (ref 2.5–4.5)
PHOSPHATE SERPL-MCNC: 3.5 MG/DL — SIGNIFICANT CHANGE UP (ref 2.5–4.5)
PHOSPHATE SERPL-MCNC: 3.6 MG/DL — SIGNIFICANT CHANGE UP (ref 2.5–4.5)
PHOSPHATE SERPL-MCNC: 3.8 MG/DL — SIGNIFICANT CHANGE UP (ref 2.5–4.5)
PLATELET # BLD AUTO: 163 K/UL — SIGNIFICANT CHANGE UP (ref 150–400)
PLATELET # BLD AUTO: 193 K/UL — SIGNIFICANT CHANGE UP (ref 150–400)
PLATELET # BLD AUTO: 201 K/UL — SIGNIFICANT CHANGE UP (ref 150–400)
PLATELET # BLD AUTO: 203 K/UL — SIGNIFICANT CHANGE UP (ref 150–400)
PLATELET # BLD AUTO: 203 K/UL — SIGNIFICANT CHANGE UP (ref 150–400)
PLATELET # BLD AUTO: 204 K/UL — SIGNIFICANT CHANGE UP (ref 150–400)
PLATELET # BLD AUTO: 205 K/UL — SIGNIFICANT CHANGE UP (ref 150–400)
PLATELET # BLD AUTO: 208 K/UL — SIGNIFICANT CHANGE UP (ref 150–400)
PLATELET # BLD AUTO: 216 K/UL — SIGNIFICANT CHANGE UP (ref 150–400)
PLATELET # BLD AUTO: 218 K/UL — SIGNIFICANT CHANGE UP (ref 150–400)
PLATELET # BLD AUTO: 219 K/UL — SIGNIFICANT CHANGE UP (ref 150–400)
PLATELET # BLD AUTO: 220 K/UL — SIGNIFICANT CHANGE UP (ref 150–400)
PLATELET # BLD AUTO: 221 K/UL — SIGNIFICANT CHANGE UP (ref 150–400)
PLATELET # BLD AUTO: 225 K/UL — SIGNIFICANT CHANGE UP (ref 150–400)
PLATELET # BLD AUTO: 227 K/UL — SIGNIFICANT CHANGE UP (ref 150–400)
PLATELET # BLD AUTO: 229 K/UL — SIGNIFICANT CHANGE UP (ref 150–400)
PLATELET # BLD AUTO: 230 K/UL — SIGNIFICANT CHANGE UP (ref 150–400)
PLATELET # BLD AUTO: 231 K/UL — SIGNIFICANT CHANGE UP (ref 150–400)
PLATELET # BLD AUTO: 232 K/UL — SIGNIFICANT CHANGE UP (ref 150–400)
PLATELET # BLD AUTO: 241 K/UL — SIGNIFICANT CHANGE UP (ref 150–400)
PLATELET # BLD AUTO: 243 K/UL — SIGNIFICANT CHANGE UP (ref 150–400)
PLATELET # BLD AUTO: 246 K/UL — SIGNIFICANT CHANGE UP (ref 150–400)
PLATELET # BLD AUTO: 249 K/UL — SIGNIFICANT CHANGE UP (ref 150–400)
PLATELET # BLD AUTO: 252 K/UL — SIGNIFICANT CHANGE UP (ref 150–400)
PLATELET # BLD AUTO: 252 K/UL — SIGNIFICANT CHANGE UP (ref 150–400)
PLATELET # BLD AUTO: 254 K/UL — SIGNIFICANT CHANGE UP (ref 150–400)
PLATELET # BLD AUTO: 254 K/UL — SIGNIFICANT CHANGE UP (ref 150–400)
PLATELET # BLD AUTO: 256 K/UL — SIGNIFICANT CHANGE UP (ref 150–400)
PLATELET # BLD AUTO: 257 K/UL — SIGNIFICANT CHANGE UP (ref 150–400)
PLATELET # BLD AUTO: 259 K/UL — SIGNIFICANT CHANGE UP (ref 150–400)
PLATELET # BLD AUTO: 259 K/UL — SIGNIFICANT CHANGE UP (ref 150–400)
PLATELET # BLD AUTO: 268 K/UL — SIGNIFICANT CHANGE UP (ref 150–400)
PLATELET # BLD AUTO: 270 K/UL — SIGNIFICANT CHANGE UP (ref 150–400)
PLATELET # BLD AUTO: 271 K/UL — SIGNIFICANT CHANGE UP (ref 150–400)
PLATELET # BLD AUTO: 273 K/UL — SIGNIFICANT CHANGE UP (ref 150–400)
PLATELET # BLD AUTO: 277 K/UL — SIGNIFICANT CHANGE UP (ref 150–400)
PLATELET # BLD AUTO: 278 K/UL — SIGNIFICANT CHANGE UP (ref 150–400)
PLATELET # BLD AUTO: 280 K/UL — SIGNIFICANT CHANGE UP (ref 150–400)
PLATELET # BLD AUTO: 280 K/UL — SIGNIFICANT CHANGE UP (ref 150–400)
PLATELET # BLD AUTO: 287 K/UL — SIGNIFICANT CHANGE UP (ref 150–400)
PLATELET # BLD AUTO: 291 K/UL — SIGNIFICANT CHANGE UP (ref 150–400)
PLATELET # BLD AUTO: 291 K/UL — SIGNIFICANT CHANGE UP (ref 150–400)
PLATELET # BLD AUTO: 295 K/UL — SIGNIFICANT CHANGE UP (ref 150–400)
PLATELET # BLD AUTO: 300 K/UL — SIGNIFICANT CHANGE UP (ref 150–400)
PLATELET # BLD AUTO: 302 K/UL — SIGNIFICANT CHANGE UP (ref 150–400)
PLATELET # BLD AUTO: 303 K/UL — SIGNIFICANT CHANGE UP (ref 150–400)
PLATELET # BLD AUTO: 304 K/UL — SIGNIFICANT CHANGE UP (ref 150–400)
PLATELET # BLD AUTO: 310 K/UL — SIGNIFICANT CHANGE UP (ref 150–400)
PLATELET # BLD AUTO: 311 K/UL — SIGNIFICANT CHANGE UP (ref 150–400)
PLATELET # BLD AUTO: 311 K/UL — SIGNIFICANT CHANGE UP (ref 150–400)
PLATELET # BLD AUTO: 314 K/UL — SIGNIFICANT CHANGE UP (ref 150–400)
PLATELET # BLD AUTO: 316 K/UL — SIGNIFICANT CHANGE UP (ref 150–400)
PLATELET # BLD AUTO: 318 K/UL — SIGNIFICANT CHANGE UP (ref 150–400)
PLATELET # BLD AUTO: 319 K/UL — SIGNIFICANT CHANGE UP (ref 150–400)
PLATELET # BLD AUTO: 319 K/UL — SIGNIFICANT CHANGE UP (ref 150–400)
PLATELET # BLD AUTO: 320 K/UL — SIGNIFICANT CHANGE UP (ref 150–400)
PLATELET # BLD AUTO: 321 K/UL — SIGNIFICANT CHANGE UP (ref 150–400)
PLATELET # BLD AUTO: 323 K/UL — SIGNIFICANT CHANGE UP (ref 150–400)
PLATELET # BLD AUTO: 326 K/UL
PLATELET # BLD AUTO: 328 K/UL — SIGNIFICANT CHANGE UP (ref 150–400)
PLATELET # BLD AUTO: 330 K/UL — SIGNIFICANT CHANGE UP (ref 150–400)
PLATELET # BLD AUTO: 330 K/UL — SIGNIFICANT CHANGE UP (ref 150–400)
PLATELET # BLD AUTO: 331 K/UL — SIGNIFICANT CHANGE UP (ref 150–400)
PLATELET # BLD AUTO: 332 K/UL — SIGNIFICANT CHANGE UP (ref 150–400)
PLATELET # BLD AUTO: 333 K/UL — SIGNIFICANT CHANGE UP (ref 150–400)
PLATELET # BLD AUTO: 334 K/UL — SIGNIFICANT CHANGE UP (ref 150–400)
PLATELET # BLD AUTO: 334 K/UL — SIGNIFICANT CHANGE UP (ref 150–400)
PLATELET # BLD AUTO: 336 K/UL — SIGNIFICANT CHANGE UP (ref 150–400)
PLATELET # BLD AUTO: 342 K/UL — SIGNIFICANT CHANGE UP (ref 150–400)
PLATELET # BLD AUTO: 343 K/UL — SIGNIFICANT CHANGE UP (ref 150–400)
PLATELET # BLD AUTO: 345 K/UL — SIGNIFICANT CHANGE UP (ref 150–400)
PLATELET # BLD AUTO: 347 K/UL — SIGNIFICANT CHANGE UP (ref 150–400)
PLATELET # BLD AUTO: 348 K/UL — SIGNIFICANT CHANGE UP (ref 150–400)
PLATELET # BLD AUTO: 348 K/UL — SIGNIFICANT CHANGE UP (ref 150–400)
PLATELET # BLD AUTO: 349 K/UL — SIGNIFICANT CHANGE UP (ref 150–400)
PLATELET # BLD AUTO: 352 K/UL — SIGNIFICANT CHANGE UP (ref 150–400)
PLATELET # BLD AUTO: 353 K/UL — SIGNIFICANT CHANGE UP (ref 150–400)
PLATELET # BLD AUTO: 354 K/UL — SIGNIFICANT CHANGE UP (ref 150–400)
PLATELET # BLD AUTO: 354 K/UL — SIGNIFICANT CHANGE UP (ref 150–400)
PLATELET # BLD AUTO: 355 K/UL — SIGNIFICANT CHANGE UP (ref 150–400)
PLATELET # BLD AUTO: 357 K/UL — SIGNIFICANT CHANGE UP (ref 150–400)
PLATELET # BLD AUTO: 357 K/UL — SIGNIFICANT CHANGE UP (ref 150–400)
PLATELET # BLD AUTO: 359 K/UL — SIGNIFICANT CHANGE UP (ref 150–400)
PLATELET # BLD AUTO: 362 K/UL — SIGNIFICANT CHANGE UP (ref 150–400)
PLATELET # BLD AUTO: 364 K/UL — SIGNIFICANT CHANGE UP (ref 150–400)
PLATELET # BLD AUTO: 365 K/UL — SIGNIFICANT CHANGE UP (ref 150–400)
PLATELET # BLD AUTO: 366 K/UL — SIGNIFICANT CHANGE UP (ref 150–400)
PLATELET # BLD AUTO: 366 K/UL — SIGNIFICANT CHANGE UP (ref 150–400)
PLATELET # BLD AUTO: 368 K/UL — SIGNIFICANT CHANGE UP (ref 150–400)
PLATELET # BLD AUTO: 369 K/UL — SIGNIFICANT CHANGE UP (ref 150–400)
PLATELET # BLD AUTO: 370 K/UL — SIGNIFICANT CHANGE UP (ref 150–400)
PLATELET # BLD AUTO: 375 K/UL — SIGNIFICANT CHANGE UP (ref 150–400)
PLATELET # BLD AUTO: 375 K/UL — SIGNIFICANT CHANGE UP (ref 150–400)
PLATELET # BLD AUTO: 379 K/UL — SIGNIFICANT CHANGE UP (ref 150–400)
PLATELET # BLD AUTO: 379 K/UL — SIGNIFICANT CHANGE UP (ref 150–400)
PLATELET # BLD AUTO: 380 K/UL — SIGNIFICANT CHANGE UP (ref 150–400)
PLATELET # BLD AUTO: 381 K/UL — SIGNIFICANT CHANGE UP (ref 150–400)
PLATELET # BLD AUTO: 382 K/UL — SIGNIFICANT CHANGE UP (ref 150–400)
PLATELET # BLD AUTO: 383 K/UL — SIGNIFICANT CHANGE UP (ref 150–400)
PLATELET # BLD AUTO: 389 K/UL — SIGNIFICANT CHANGE UP (ref 150–400)
PLATELET # BLD AUTO: 390 K/UL — SIGNIFICANT CHANGE UP (ref 150–400)
PLATELET # BLD AUTO: 397 K/UL — SIGNIFICANT CHANGE UP (ref 150–400)
PLATELET # BLD AUTO: 398 K/UL — SIGNIFICANT CHANGE UP (ref 150–400)
PLATELET # BLD AUTO: 398 K/UL — SIGNIFICANT CHANGE UP (ref 150–400)
PLATELET # BLD AUTO: 399 K/UL — SIGNIFICANT CHANGE UP (ref 150–400)
PLATELET # BLD AUTO: 399 K/UL — SIGNIFICANT CHANGE UP (ref 150–400)
PLATELET # BLD AUTO: 412 K/UL — HIGH (ref 150–400)
PLATELET # BLD AUTO: 413 K/UL — HIGH (ref 150–400)
POTASSIUM SERPL-MCNC: 3.6 MMOL/L — SIGNIFICANT CHANGE UP (ref 3.5–5.3)
POTASSIUM SERPL-MCNC: 3.7 MMOL/L — SIGNIFICANT CHANGE UP (ref 3.5–5.3)
POTASSIUM SERPL-MCNC: 3.7 MMOL/L — SIGNIFICANT CHANGE UP (ref 3.5–5.3)
POTASSIUM SERPL-MCNC: 3.8 MMOL/L — SIGNIFICANT CHANGE UP (ref 3.5–5.3)
POTASSIUM SERPL-MCNC: 3.9 MMOL/L — SIGNIFICANT CHANGE UP (ref 3.5–5.3)
POTASSIUM SERPL-MCNC: 4 MMOL/L — SIGNIFICANT CHANGE UP (ref 3.5–5.3)
POTASSIUM SERPL-MCNC: 4.1 MMOL/L — SIGNIFICANT CHANGE UP (ref 3.5–5.3)
POTASSIUM SERPL-MCNC: 4.2 MMOL/L — SIGNIFICANT CHANGE UP (ref 3.5–5.3)
POTASSIUM SERPL-MCNC: 4.3 MMOL/L — SIGNIFICANT CHANGE UP (ref 3.5–5.3)
POTASSIUM SERPL-MCNC: 4.4 MMOL/L — SIGNIFICANT CHANGE UP (ref 3.5–5.3)
POTASSIUM SERPL-MCNC: 4.5 MMOL/L — SIGNIFICANT CHANGE UP (ref 3.5–5.3)
POTASSIUM SERPL-MCNC: 4.6 MMOL/L — SIGNIFICANT CHANGE UP (ref 3.5–5.3)
POTASSIUM SERPL-MCNC: 4.7 MMOL/L — SIGNIFICANT CHANGE UP (ref 3.5–5.3)
POTASSIUM SERPL-MCNC: 4.7 MMOL/L — SIGNIFICANT CHANGE UP (ref 3.5–5.3)
POTASSIUM SERPL-MCNC: 4.8 MMOL/L — SIGNIFICANT CHANGE UP (ref 3.5–5.3)
POTASSIUM SERPL-MCNC: 5.3 MMOL/L — SIGNIFICANT CHANGE UP (ref 3.5–5.3)
POTASSIUM SERPL-SCNC: 3.6 MMOL/L — SIGNIFICANT CHANGE UP (ref 3.5–5.3)
POTASSIUM SERPL-SCNC: 3.7 MMOL/L — SIGNIFICANT CHANGE UP (ref 3.5–5.3)
POTASSIUM SERPL-SCNC: 3.7 MMOL/L — SIGNIFICANT CHANGE UP (ref 3.5–5.3)
POTASSIUM SERPL-SCNC: 3.8 MMOL/L — SIGNIFICANT CHANGE UP (ref 3.5–5.3)
POTASSIUM SERPL-SCNC: 3.9 MMOL/L — SIGNIFICANT CHANGE UP (ref 3.5–5.3)
POTASSIUM SERPL-SCNC: 4 MMOL/L — SIGNIFICANT CHANGE UP (ref 3.5–5.3)
POTASSIUM SERPL-SCNC: 4.1 MMOL/L
POTASSIUM SERPL-SCNC: 4.1 MMOL/L — SIGNIFICANT CHANGE UP (ref 3.5–5.3)
POTASSIUM SERPL-SCNC: 4.2 MMOL/L — SIGNIFICANT CHANGE UP (ref 3.5–5.3)
POTASSIUM SERPL-SCNC: 4.3 MMOL/L — SIGNIFICANT CHANGE UP (ref 3.5–5.3)
POTASSIUM SERPL-SCNC: 4.4 MMOL/L
POTASSIUM SERPL-SCNC: 4.4 MMOL/L — SIGNIFICANT CHANGE UP (ref 3.5–5.3)
POTASSIUM SERPL-SCNC: 4.5 MMOL/L — SIGNIFICANT CHANGE UP (ref 3.5–5.3)
POTASSIUM SERPL-SCNC: 4.6 MMOL/L — SIGNIFICANT CHANGE UP (ref 3.5–5.3)
POTASSIUM SERPL-SCNC: 4.7 MMOL/L — SIGNIFICANT CHANGE UP (ref 3.5–5.3)
POTASSIUM SERPL-SCNC: 4.7 MMOL/L — SIGNIFICANT CHANGE UP (ref 3.5–5.3)
POTASSIUM SERPL-SCNC: 4.8 MMOL/L — SIGNIFICANT CHANGE UP (ref 3.5–5.3)
POTASSIUM SERPL-SCNC: 5.3 MMOL/L — SIGNIFICANT CHANGE UP (ref 3.5–5.3)
PREALB SERPL-MCNC: 19 MG/DL — LOW (ref 20–40)
PROCALCITONIN SERPL-MCNC: 0.86 NG/ML — HIGH (ref 0.02–0.1)
PROT PATTERN SERPL ELPH-IMP: SIGNIFICANT CHANGE UP
PROT SERPL-MCNC: 6.2 G/DL — SIGNIFICANT CHANGE UP (ref 6–8.3)
PROT SERPL-MCNC: 6.2 G/DL — SIGNIFICANT CHANGE UP (ref 6–8.3)
PROT SERPL-MCNC: 6.8 G/DL — SIGNIFICANT CHANGE UP (ref 6–8.3)
PROT SERPL-MCNC: 6.9 G/DL — SIGNIFICANT CHANGE UP (ref 6–8.3)
PROT SERPL-MCNC: 6.9 G/DL — SIGNIFICANT CHANGE UP (ref 6–8.3)
PROT SERPL-MCNC: 7 G/DL
PROT SERPL-MCNC: 7 G/DL — SIGNIFICANT CHANGE UP (ref 6–8.3)
PROT SERPL-MCNC: 7.1 G/DL — SIGNIFICANT CHANGE UP (ref 6–8.3)
PROT SERPL-MCNC: 7.1 G/DL — SIGNIFICANT CHANGE UP (ref 6–8.3)
PROT SERPL-MCNC: 7.2 G/DL — SIGNIFICANT CHANGE UP (ref 6–8.3)
PROT SERPL-MCNC: 7.2 G/DL — SIGNIFICANT CHANGE UP (ref 6–8.3)
PROT SERPL-MCNC: 7.3 G/DL — SIGNIFICANT CHANGE UP (ref 6–8.3)
PROT SERPL-MCNC: 7.4 G/DL — SIGNIFICANT CHANGE UP (ref 6–8.3)
PROT SERPL-MCNC: 7.4 G/DL — SIGNIFICANT CHANGE UP (ref 6–8.3)
PROT SERPL-MCNC: 7.5 G/DL — SIGNIFICANT CHANGE UP (ref 6–8.3)
PROT SERPL-MCNC: 7.6 G/DL — SIGNIFICANT CHANGE UP (ref 6–8.3)
PROT SERPL-MCNC: 7.6 G/DL — SIGNIFICANT CHANGE UP (ref 6–8.3)
PROT SERPL-MCNC: 7.7 G/DL — SIGNIFICANT CHANGE UP (ref 6–8.3)
PROT SERPL-MCNC: 7.8 G/DL — SIGNIFICANT CHANGE UP (ref 6–8.3)
PROT SERPL-MCNC: 7.9 G/DL — SIGNIFICANT CHANGE UP (ref 6–8.3)
PROT SERPL-MCNC: 8 G/DL — SIGNIFICANT CHANGE UP (ref 6–8.3)
PROT SERPL-MCNC: 8.1 G/DL — SIGNIFICANT CHANGE UP (ref 6–8.3)
PROT SERPL-MCNC: 8.4 G/DL — HIGH (ref 6–8.3)
PROT SERPL-MCNC: 8.4 G/DL — HIGH (ref 6–8.3)
PROT UR-MCNC: 500 MG/DL
PROT UR-MCNC: ABNORMAL
PROT UR-MCNC: ABNORMAL
PROTEIN URINE: ABNORMAL
PROTEIN URINE: ABNORMAL
PROTHROM AB SERPL-ACNC: 12.1 SEC — SIGNIFICANT CHANGE UP (ref 10.5–13.4)
PROTHROM AB SERPL-ACNC: 12.6 SEC — SIGNIFICANT CHANGE UP (ref 10.5–13.4)
PROTHROM AB SERPL-ACNC: 13.1 SEC — SIGNIFICANT CHANGE UP (ref 10.5–13.4)
PROTHROM AB SERPL-ACNC: 13.1 SEC — SIGNIFICANT CHANGE UP (ref 10.5–13.4)
PROTHROM AB SERPL-ACNC: 13.6 SEC — HIGH (ref 10.5–13.4)
PROTHROM AB SERPL-ACNC: 13.7 SEC — HIGH (ref 10.5–13.4)
PROTHROM AB SERPL-ACNC: 14 SEC — HIGH (ref 10.5–13.4)
PROTHROM AB SERPL-ACNC: 14.3 SEC — HIGH (ref 10.5–13.4)
PROTHROM AB SERPL-ACNC: 15.2 SEC — HIGH (ref 10.5–13.4)
PROTHROM AB SERPL-ACNC: 15.4 SEC — HIGH (ref 10.5–13.4)
PT BLD: 14.8 SEC
PTH RELATED PROT SERPL-MCNC: <2 PMOL/L — SIGNIFICANT CHANGE UP
PTH-INTACT FLD-MCNC: 15 PG/ML — SIGNIFICANT CHANGE UP (ref 15–65)
PTH-INTACT FLD-MCNC: 45 PG/ML — SIGNIFICANT CHANGE UP (ref 15–65)
RAPID RVP RESULT: SIGNIFICANT CHANGE UP
RBC # BLD: 2.79 M/UL — LOW (ref 3.8–5.2)
RBC # BLD: 2.83 M/UL — LOW (ref 3.8–5.2)
RBC # BLD: 2.87 M/UL — LOW (ref 3.8–5.2)
RBC # BLD: 2.94 M/UL — LOW (ref 3.8–5.2)
RBC # BLD: 2.96 M/UL — LOW (ref 3.8–5.2)
RBC # BLD: 2.97 M/UL — LOW (ref 3.8–5.2)
RBC # BLD: 3.02 M/UL — LOW (ref 3.8–5.2)
RBC # BLD: 3.03 M/UL — LOW (ref 3.8–5.2)
RBC # BLD: 3.03 M/UL — LOW (ref 3.8–5.2)
RBC # BLD: 3.07 M/UL — LOW (ref 3.8–5.2)
RBC # BLD: 3.09 M/UL — LOW (ref 3.8–5.2)
RBC # BLD: 3.1 M/UL — LOW (ref 3.8–5.2)
RBC # BLD: 3.1 M/UL — LOW (ref 3.8–5.2)
RBC # BLD: 3.11 M/UL — LOW (ref 3.8–5.2)
RBC # BLD: 3.14 M/UL — LOW (ref 3.8–5.2)
RBC # BLD: 3.18 M/UL — LOW (ref 3.8–5.2)
RBC # BLD: 3.18 M/UL — LOW (ref 3.8–5.2)
RBC # BLD: 3.19 M/UL — LOW (ref 3.8–5.2)
RBC # BLD: 3.19 M/UL — LOW (ref 3.8–5.2)
RBC # BLD: 3.21 M/UL — LOW (ref 3.8–5.2)
RBC # BLD: 3.21 M/UL — LOW (ref 3.8–5.2)
RBC # BLD: 3.23 M/UL — LOW (ref 3.8–5.2)
RBC # BLD: 3.23 M/UL — LOW (ref 3.8–5.2)
RBC # BLD: 3.25 M/UL — LOW (ref 3.8–5.2)
RBC # BLD: 3.27 M/UL — LOW (ref 3.8–5.2)
RBC # BLD: 3.28 M/UL — LOW (ref 3.8–5.2)
RBC # BLD: 3.28 M/UL — LOW (ref 3.8–5.2)
RBC # BLD: 3.29 M/UL — LOW (ref 3.8–5.2)
RBC # BLD: 3.3 M/UL — LOW (ref 3.8–5.2)
RBC # BLD: 3.3 M/UL — LOW (ref 3.8–5.2)
RBC # BLD: 3.31 M/UL — LOW (ref 3.8–5.2)
RBC # BLD: 3.31 M/UL — LOW (ref 3.8–5.2)
RBC # BLD: 3.32 M/UL — LOW (ref 3.8–5.2)
RBC # BLD: 3.32 M/UL — LOW (ref 3.8–5.2)
RBC # BLD: 3.34 M/UL — LOW (ref 3.8–5.2)
RBC # BLD: 3.35 M/UL — LOW (ref 3.8–5.2)
RBC # BLD: 3.36 M/UL — LOW (ref 3.8–5.2)
RBC # BLD: 3.37 M/UL — LOW (ref 3.8–5.2)
RBC # BLD: 3.38 M/UL
RBC # BLD: 3.38 M/UL — LOW (ref 3.8–5.2)
RBC # BLD: 3.38 M/UL — LOW (ref 3.8–5.2)
RBC # BLD: 3.4 M/UL — LOW (ref 3.8–5.2)
RBC # BLD: 3.42 M/UL — LOW (ref 3.8–5.2)
RBC # BLD: 3.44 M/UL — LOW (ref 3.8–5.2)
RBC # BLD: 3.45 M/UL — LOW (ref 3.8–5.2)
RBC # BLD: 3.46 M/UL — LOW (ref 3.8–5.2)
RBC # BLD: 3.46 M/UL — LOW (ref 3.8–5.2)
RBC # BLD: 3.49 M/UL — LOW (ref 3.8–5.2)
RBC # BLD: 3.5 M/UL — LOW (ref 3.8–5.2)
RBC # BLD: 3.5 M/UL — LOW (ref 3.8–5.2)
RBC # BLD: 3.51 M/UL — LOW (ref 3.8–5.2)
RBC # BLD: 3.51 M/UL — LOW (ref 3.8–5.2)
RBC # BLD: 3.52 M/UL — LOW (ref 3.8–5.2)
RBC # BLD: 3.53 M/UL — LOW (ref 3.8–5.2)
RBC # BLD: 3.53 M/UL — LOW (ref 3.8–5.2)
RBC # BLD: 3.54 M/UL — LOW (ref 3.8–5.2)
RBC # BLD: 3.54 M/UL — LOW (ref 3.8–5.2)
RBC # BLD: 3.57 M/UL — LOW (ref 3.8–5.2)
RBC # BLD: 3.58 M/UL — LOW (ref 3.8–5.2)
RBC # BLD: 3.63 M/UL — LOW (ref 3.8–5.2)
RBC # BLD: 3.64 M/UL — LOW (ref 3.8–5.2)
RBC # BLD: 3.67 M/UL — LOW (ref 3.8–5.2)
RBC # BLD: 3.68 M/UL — LOW (ref 3.8–5.2)
RBC # BLD: 3.7 M/UL — LOW (ref 3.8–5.2)
RBC # BLD: 3.7 M/UL — LOW (ref 3.8–5.2)
RBC # BLD: 3.71 M/UL — LOW (ref 3.8–5.2)
RBC # BLD: 3.73 M/UL — LOW (ref 3.8–5.2)
RBC # BLD: 3.74 M/UL — LOW (ref 3.8–5.2)
RBC # BLD: 3.75 M/UL — LOW (ref 3.8–5.2)
RBC # BLD: 3.75 M/UL — LOW (ref 3.8–5.2)
RBC # BLD: 3.77 M/UL — LOW (ref 3.8–5.2)
RBC # BLD: 3.79 M/UL — LOW (ref 3.8–5.2)
RBC # BLD: 3.8 M/UL — SIGNIFICANT CHANGE UP (ref 3.8–5.2)
RBC # BLD: 3.82 M/UL — SIGNIFICANT CHANGE UP (ref 3.8–5.2)
RBC # BLD: 3.83 M/UL — SIGNIFICANT CHANGE UP (ref 3.8–5.2)
RBC # BLD: 3.84 M/UL — SIGNIFICANT CHANGE UP (ref 3.8–5.2)
RBC # BLD: 3.85 M/UL — SIGNIFICANT CHANGE UP (ref 3.8–5.2)
RBC # BLD: 3.85 M/UL — SIGNIFICANT CHANGE UP (ref 3.8–5.2)
RBC # BLD: 3.87 M/UL — SIGNIFICANT CHANGE UP (ref 3.8–5.2)
RBC # BLD: 3.87 M/UL — SIGNIFICANT CHANGE UP (ref 3.8–5.2)
RBC # BLD: 3.88 M/UL — SIGNIFICANT CHANGE UP (ref 3.8–5.2)
RBC # BLD: 3.89 M/UL — SIGNIFICANT CHANGE UP (ref 3.8–5.2)
RBC # BLD: 3.9 M/UL — SIGNIFICANT CHANGE UP (ref 3.8–5.2)
RBC # BLD: 3.91 M/UL — SIGNIFICANT CHANGE UP (ref 3.8–5.2)
RBC # BLD: 3.91 M/UL — SIGNIFICANT CHANGE UP (ref 3.8–5.2)
RBC # BLD: 3.93 M/UL — SIGNIFICANT CHANGE UP (ref 3.8–5.2)
RBC # BLD: 3.95 M/UL — SIGNIFICANT CHANGE UP (ref 3.8–5.2)
RBC # BLD: 4.1 M/UL — SIGNIFICANT CHANGE UP (ref 3.8–5.2)
RBC # BLD: 4.11 M/UL — SIGNIFICANT CHANGE UP (ref 3.8–5.2)
RBC # BLD: 4.13 M/UL — SIGNIFICANT CHANGE UP (ref 3.8–5.2)
RBC # BLD: 4.18 M/UL — SIGNIFICANT CHANGE UP (ref 3.8–5.2)
RBC # BLD: 4.64 M/UL — SIGNIFICANT CHANGE UP (ref 3.8–5.2)
RBC # FLD: 12 % — SIGNIFICANT CHANGE UP (ref 10.3–14.5)
RBC # FLD: 12.1 % — SIGNIFICANT CHANGE UP (ref 10.3–14.5)
RBC # FLD: 12.4 % — SIGNIFICANT CHANGE UP (ref 10.3–14.5)
RBC # FLD: 12.5 % — SIGNIFICANT CHANGE UP (ref 10.3–14.5)
RBC # FLD: 12.6 % — SIGNIFICANT CHANGE UP (ref 10.3–14.5)
RBC # FLD: 12.7 % — SIGNIFICANT CHANGE UP (ref 10.3–14.5)
RBC # FLD: 12.9 % — SIGNIFICANT CHANGE UP (ref 10.3–14.5)
RBC # FLD: 13 % — SIGNIFICANT CHANGE UP (ref 10.3–14.5)
RBC # FLD: 13.1 % — SIGNIFICANT CHANGE UP (ref 10.3–14.5)
RBC # FLD: 13.2 % — SIGNIFICANT CHANGE UP (ref 10.3–14.5)
RBC # FLD: 13.2 % — SIGNIFICANT CHANGE UP (ref 10.3–14.5)
RBC # FLD: 13.3 % — SIGNIFICANT CHANGE UP (ref 10.3–14.5)
RBC # FLD: 13.4 % — SIGNIFICANT CHANGE UP (ref 10.3–14.5)
RBC # FLD: 13.4 % — SIGNIFICANT CHANGE UP (ref 10.3–14.5)
RBC # FLD: 13.5 % — SIGNIFICANT CHANGE UP (ref 10.3–14.5)
RBC # FLD: 13.6 % — SIGNIFICANT CHANGE UP (ref 10.3–14.5)
RBC # FLD: 13.8 % — SIGNIFICANT CHANGE UP (ref 10.3–14.5)
RBC # FLD: 14 % — SIGNIFICANT CHANGE UP (ref 10.3–14.5)
RBC # FLD: 14.2 % — SIGNIFICANT CHANGE UP (ref 10.3–14.5)
RBC # FLD: 14.2 % — SIGNIFICANT CHANGE UP (ref 10.3–14.5)
RBC # FLD: 14.3 % — SIGNIFICANT CHANGE UP (ref 10.3–14.5)
RBC # FLD: 14.4 % — SIGNIFICANT CHANGE UP (ref 10.3–14.5)
RBC # FLD: 14.5 % — SIGNIFICANT CHANGE UP (ref 10.3–14.5)
RBC # FLD: 14.6 % — HIGH (ref 10.3–14.5)
RBC # FLD: 14.7 % — HIGH (ref 10.3–14.5)
RBC # FLD: 14.7 % — HIGH (ref 10.3–14.5)
RBC # FLD: 14.8 % — HIGH (ref 10.3–14.5)
RBC # FLD: 14.9 % — HIGH (ref 10.3–14.5)
RBC # FLD: 15 % — HIGH (ref 10.3–14.5)
RBC # FLD: 15.1 % — HIGH (ref 10.3–14.5)
RBC # FLD: 15.2 % — HIGH (ref 10.3–14.5)
RBC # FLD: 15.4 % — HIGH (ref 10.3–14.5)
RBC # FLD: 15.6 % — HIGH (ref 10.3–14.5)
RBC # FLD: 15.9 % — HIGH (ref 10.3–14.5)
RBC # FLD: 16 % — HIGH (ref 10.3–14.5)
RBC # FLD: 16 % — HIGH (ref 10.3–14.5)
RBC # FLD: 16.1 % — HIGH (ref 10.3–14.5)
RBC # FLD: 16.3 %
RBC # FLD: 16.3 % — HIGH (ref 10.3–14.5)
RBC # FLD: 16.3 % — HIGH (ref 10.3–14.5)
RBC # FLD: 16.4 % — HIGH (ref 10.3–14.5)
RBC # FLD: 16.5 % — HIGH (ref 10.3–14.5)
RBC # FLD: 16.5 % — HIGH (ref 10.3–14.5)
RBC CASTS # UR COMP ASSIST: >50 /HPF (ref 0–2)
RBC CASTS # UR COMP ASSIST: >50 /HPF — SIGNIFICANT CHANGE UP (ref 0–4)
RBC CASTS # UR COMP ASSIST: ABNORMAL /HPF (ref 0–2)
RED BLOOD CELLS URINE: 100 /HPF
RED BLOOD CELLS URINE: 568 /HPF
RETICS #: 43.1 K/UL — SIGNIFICANT CHANGE UP (ref 25–125)
RETICS/RBC NFR: 1 % — SIGNIFICANT CHANGE UP (ref 0.5–2.5)
RH IG SCN BLD-IMP: POSITIVE — SIGNIFICANT CHANGE UP
RH IG SCN BLD-IMP: POSITIVE — SIGNIFICANT CHANGE UP
RSV RNA SPEC QL NAA+PROBE: SIGNIFICANT CHANGE UP
RV+EV RNA SPEC QL NAA+PROBE: SIGNIFICANT CHANGE UP
S AUREUS DNA NOSE QL NAA+PROBE: SIGNIFICANT CHANGE UP
SARS-COV-2 N GENE NPH QL NAA+PROBE: NOT DETECTED
SARS-COV-2 RNA SPEC QL NAA+PROBE: DETECTED
SARS-COV-2 RNA SPEC QL NAA+PROBE: SIGNIFICANT CHANGE UP
SODIUM SERPL-SCNC: 132 MMOL/L — LOW (ref 135–145)
SODIUM SERPL-SCNC: 132 MMOL/L — LOW (ref 135–145)
SODIUM SERPL-SCNC: 133 MMOL/L — LOW (ref 135–145)
SODIUM SERPL-SCNC: 133 MMOL/L — LOW (ref 135–145)
SODIUM SERPL-SCNC: 134 MMOL/L
SODIUM SERPL-SCNC: 134 MMOL/L — LOW (ref 135–145)
SODIUM SERPL-SCNC: 135 MMOL/L
SODIUM SERPL-SCNC: 135 MMOL/L — SIGNIFICANT CHANGE UP (ref 135–145)
SODIUM SERPL-SCNC: 136 MMOL/L — SIGNIFICANT CHANGE UP (ref 135–145)
SODIUM SERPL-SCNC: 137 MMOL/L — SIGNIFICANT CHANGE UP (ref 135–145)
SODIUM SERPL-SCNC: 138 MMOL/L — SIGNIFICANT CHANGE UP (ref 135–145)
SODIUM SERPL-SCNC: 139 MMOL/L — SIGNIFICANT CHANGE UP (ref 135–145)
SODIUM SERPL-SCNC: 140 MMOL/L — SIGNIFICANT CHANGE UP (ref 135–145)
SODIUM SERPL-SCNC: 141 MMOL/L — SIGNIFICANT CHANGE UP (ref 135–145)
SODIUM SERPL-SCNC: 142 MMOL/L — SIGNIFICANT CHANGE UP (ref 135–145)
SODIUM SERPL-SCNC: 146 MMOL/L — HIGH (ref 135–145)
SODIUM SERPL-SCNC: 161 MMOL/L — CRITICAL HIGH (ref 135–145)
SODIUM UR-SCNC: 127 MMOL/L — SIGNIFICANT CHANGE UP
SP GR SPEC: 1.01 — SIGNIFICANT CHANGE UP (ref 1.01–1.02)
SP GR SPEC: 1.02 — SIGNIFICANT CHANGE UP (ref 1.01–1.02)
SP GR SPEC: 1.03 — HIGH (ref 1.01–1.02)
SP GR SPEC: 1.03 — SIGNIFICANT CHANGE UP (ref 1.01–1.05)
SP GR SPEC: 1.03 — SIGNIFICANT CHANGE UP (ref 1.01–1.05)
SPECIFIC GRAVITY URINE: 1.02
SPECIFIC GRAVITY URINE: 1.02
SPECIMEN SOURCE: SIGNIFICANT CHANGE UP
SQUAMOUS EPITHELIAL CELLS: 2 /HPF
TIBC SERPL-MCNC: 180 UG/DL — LOW (ref 250–450)
TIBC SERPL-MCNC: 228 UG/DL — LOW (ref 250–450)
TRANSFERRIN SERPL-MCNC: 156 MG/DL — LOW (ref 200–360)
TRIGL SERPL-MCNC: 110 MG/DL — SIGNIFICANT CHANGE UP
TROPONIN I, HIGH SENSITIVITY RESULT: 4.2 NG/L — SIGNIFICANT CHANGE UP
TROPONIN I, HIGH SENSITIVITY RESULT: 4.5 NG/L — SIGNIFICANT CHANGE UP
TROPONIN I, HIGH SENSITIVITY RESULT: 5.1 NG/L — SIGNIFICANT CHANGE UP
TROPONIN I, HIGH SENSITIVITY RESULT: 5.3 NG/L — SIGNIFICANT CHANGE UP
TROPONIN I, HIGH SENSITIVITY RESULT: 5.3 NG/L — SIGNIFICANT CHANGE UP
TROPONIN I, HIGH SENSITIVITY RESULT: 5.5 NG/L — SIGNIFICANT CHANGE UP
TROPONIN I, HIGH SENSITIVITY RESULT: 5.5 NG/L — SIGNIFICANT CHANGE UP
TROPONIN I, HIGH SENSITIVITY RESULT: 5.7 NG/L — SIGNIFICANT CHANGE UP
TROPONIN I, HIGH SENSITIVITY RESULT: 5.8 NG/L — SIGNIFICANT CHANGE UP
TROPONIN I, HIGH SENSITIVITY RESULT: 6.5 NG/L — SIGNIFICANT CHANGE UP
TROPONIN I, HIGH SENSITIVITY RESULT: 7 NG/L — SIGNIFICANT CHANGE UP
TROPONIN I, HIGH SENSITIVITY RESULT: 7.5 NG/L — SIGNIFICANT CHANGE UP
TROPONIN I, HIGH SENSITIVITY RESULT: 7.5 NG/L — SIGNIFICANT CHANGE UP
TROPONIN T, HIGH SENSITIVITY RESULT: <6 NG/L — SIGNIFICANT CHANGE UP
TSH SERPL-MCNC: 1.51 UIU/ML — SIGNIFICANT CHANGE UP (ref 0.27–4.2)
UIBC SERPL-MCNC: 164 UG/DL — SIGNIFICANT CHANGE UP (ref 110–370)
UIBC SERPL-MCNC: 214 UG/DL — SIGNIFICANT CHANGE UP (ref 110–370)
URINE COMMENTS: NORMAL
UROBILINOGEN FLD QL: NEGATIVE — SIGNIFICANT CHANGE UP
UROBILINOGEN FLD QL: SIGNIFICANT CHANGE UP
UROBILINOGEN FLD QL: SIGNIFICANT CHANGE UP
UROBILINOGEN URINE: NORMAL
UROBILINOGEN URINE: NORMAL
VIT B1 SERPL-MCNC: 137.6 NMOL/L — SIGNIFICANT CHANGE UP (ref 66.5–200)
VIT B12 SERPL-MCNC: >2000 PG/ML — HIGH (ref 232–1245)
WBC # BLD: 10 K/UL — SIGNIFICANT CHANGE UP (ref 3.8–10.5)
WBC # BLD: 10.04 K/UL — SIGNIFICANT CHANGE UP (ref 3.8–10.5)
WBC # BLD: 10.08 K/UL — SIGNIFICANT CHANGE UP (ref 3.8–10.5)
WBC # BLD: 10.27 K/UL — SIGNIFICANT CHANGE UP (ref 3.8–10.5)
WBC # BLD: 10.54 K/UL — HIGH (ref 3.8–10.5)
WBC # BLD: 10.57 K/UL — HIGH (ref 3.8–10.5)
WBC # BLD: 10.75 K/UL — HIGH (ref 3.8–10.5)
WBC # BLD: 11.27 K/UL — HIGH (ref 3.8–10.5)
WBC # BLD: 11.29 K/UL — HIGH (ref 3.8–10.5)
WBC # BLD: 11.84 K/UL — HIGH (ref 3.8–10.5)
WBC # BLD: 12.09 K/UL — HIGH (ref 3.8–10.5)
WBC # BLD: 12.15 K/UL — HIGH (ref 3.8–10.5)
WBC # BLD: 12.73 K/UL — HIGH (ref 3.8–10.5)
WBC # BLD: 12.9 K/UL — HIGH (ref 3.8–10.5)
WBC # BLD: 13.31 K/UL — HIGH (ref 3.8–10.5)
WBC # BLD: 13.34 K/UL — HIGH (ref 3.8–10.5)
WBC # BLD: 13.47 K/UL — HIGH (ref 3.8–10.5)
WBC # BLD: 13.75 K/UL — HIGH (ref 3.8–10.5)
WBC # BLD: 14.51 K/UL — HIGH (ref 3.8–10.5)
WBC # BLD: 14.67 K/UL — HIGH (ref 3.8–10.5)
WBC # BLD: 14.83 K/UL — HIGH (ref 3.8–10.5)
WBC # BLD: 15.81 K/UL — HIGH (ref 3.8–10.5)
WBC # BLD: 16.04 K/UL — HIGH (ref 3.8–10.5)
WBC # BLD: 16.33 K/UL — HIGH (ref 3.8–10.5)
WBC # BLD: 16.89 K/UL — HIGH (ref 3.8–10.5)
WBC # BLD: 17.21 K/UL — HIGH (ref 3.8–10.5)
WBC # BLD: 17.67 K/UL — HIGH (ref 3.8–10.5)
WBC # BLD: 4.62 K/UL — SIGNIFICANT CHANGE UP (ref 3.8–10.5)
WBC # BLD: 4.88 K/UL — SIGNIFICANT CHANGE UP (ref 3.8–10.5)
WBC # BLD: 4.9 K/UL — SIGNIFICANT CHANGE UP (ref 3.8–10.5)
WBC # BLD: 4.93 K/UL — SIGNIFICANT CHANGE UP (ref 3.8–10.5)
WBC # BLD: 4.95 K/UL — SIGNIFICANT CHANGE UP (ref 3.8–10.5)
WBC # BLD: 5.02 K/UL — SIGNIFICANT CHANGE UP (ref 3.8–10.5)
WBC # BLD: 5.05 K/UL — SIGNIFICANT CHANGE UP (ref 3.8–10.5)
WBC # BLD: 5.1 K/UL — SIGNIFICANT CHANGE UP (ref 3.8–10.5)
WBC # BLD: 5.11 K/UL — SIGNIFICANT CHANGE UP (ref 3.8–10.5)
WBC # BLD: 5.22 K/UL — SIGNIFICANT CHANGE UP (ref 3.8–10.5)
WBC # BLD: 5.22 K/UL — SIGNIFICANT CHANGE UP (ref 3.8–10.5)
WBC # BLD: 5.45 K/UL — SIGNIFICANT CHANGE UP (ref 3.8–10.5)
WBC # BLD: 5.56 K/UL — SIGNIFICANT CHANGE UP (ref 3.8–10.5)
WBC # BLD: 5.62 K/UL — SIGNIFICANT CHANGE UP (ref 3.8–10.5)
WBC # BLD: 5.69 K/UL — SIGNIFICANT CHANGE UP (ref 3.8–10.5)
WBC # BLD: 5.72 K/UL — SIGNIFICANT CHANGE UP (ref 3.8–10.5)
WBC # BLD: 5.76 K/UL — SIGNIFICANT CHANGE UP (ref 3.8–10.5)
WBC # BLD: 5.76 K/UL — SIGNIFICANT CHANGE UP (ref 3.8–10.5)
WBC # BLD: 5.8 K/UL — SIGNIFICANT CHANGE UP (ref 3.8–10.5)
WBC # BLD: 6.02 K/UL — SIGNIFICANT CHANGE UP (ref 3.8–10.5)
WBC # BLD: 6.03 K/UL — SIGNIFICANT CHANGE UP (ref 3.8–10.5)
WBC # BLD: 6.08 K/UL — SIGNIFICANT CHANGE UP (ref 3.8–10.5)
WBC # BLD: 6.21 K/UL — SIGNIFICANT CHANGE UP (ref 3.8–10.5)
WBC # BLD: 6.25 K/UL — SIGNIFICANT CHANGE UP (ref 3.8–10.5)
WBC # BLD: 6.31 K/UL — SIGNIFICANT CHANGE UP (ref 3.8–10.5)
WBC # BLD: 6.32 K/UL — SIGNIFICANT CHANGE UP (ref 3.8–10.5)
WBC # BLD: 6.86 K/UL — SIGNIFICANT CHANGE UP (ref 3.8–10.5)
WBC # BLD: 6.9 K/UL — SIGNIFICANT CHANGE UP (ref 3.8–10.5)
WBC # BLD: 6.93 K/UL — SIGNIFICANT CHANGE UP (ref 3.8–10.5)
WBC # BLD: 7.14 K/UL — SIGNIFICANT CHANGE UP (ref 3.8–10.5)
WBC # BLD: 7.21 K/UL — SIGNIFICANT CHANGE UP (ref 3.8–10.5)
WBC # BLD: 7.21 K/UL — SIGNIFICANT CHANGE UP (ref 3.8–10.5)
WBC # BLD: 7.25 K/UL — SIGNIFICANT CHANGE UP (ref 3.8–10.5)
WBC # BLD: 7.31 K/UL — SIGNIFICANT CHANGE UP (ref 3.8–10.5)
WBC # BLD: 7.38 K/UL — SIGNIFICANT CHANGE UP (ref 3.8–10.5)
WBC # BLD: 7.42 K/UL — SIGNIFICANT CHANGE UP (ref 3.8–10.5)
WBC # BLD: 7.46 K/UL — SIGNIFICANT CHANGE UP (ref 3.8–10.5)
WBC # BLD: 7.47 K/UL — SIGNIFICANT CHANGE UP (ref 3.8–10.5)
WBC # BLD: 7.47 K/UL — SIGNIFICANT CHANGE UP (ref 3.8–10.5)
WBC # BLD: 7.5 K/UL — SIGNIFICANT CHANGE UP (ref 3.8–10.5)
WBC # BLD: 7.53 K/UL — SIGNIFICANT CHANGE UP (ref 3.8–10.5)
WBC # BLD: 7.57 K/UL — SIGNIFICANT CHANGE UP (ref 3.8–10.5)
WBC # BLD: 7.58 K/UL — SIGNIFICANT CHANGE UP (ref 3.8–10.5)
WBC # BLD: 7.86 K/UL — SIGNIFICANT CHANGE UP (ref 3.8–10.5)
WBC # BLD: 7.88 K/UL — SIGNIFICANT CHANGE UP (ref 3.8–10.5)
WBC # BLD: 7.9 K/UL — SIGNIFICANT CHANGE UP (ref 3.8–10.5)
WBC # BLD: 7.94 K/UL — SIGNIFICANT CHANGE UP (ref 3.8–10.5)
WBC # BLD: 7.95 K/UL — SIGNIFICANT CHANGE UP (ref 3.8–10.5)
WBC # BLD: 7.96 K/UL — SIGNIFICANT CHANGE UP (ref 3.8–10.5)
WBC # BLD: 8.03 K/UL — SIGNIFICANT CHANGE UP (ref 3.8–10.5)
WBC # BLD: 8.07 K/UL — SIGNIFICANT CHANGE UP (ref 3.8–10.5)
WBC # BLD: 8.09 K/UL — SIGNIFICANT CHANGE UP (ref 3.8–10.5)
WBC # BLD: 8.1 K/UL — SIGNIFICANT CHANGE UP (ref 3.8–10.5)
WBC # BLD: 8.13 K/UL — SIGNIFICANT CHANGE UP (ref 3.8–10.5)
WBC # BLD: 8.14 K/UL — SIGNIFICANT CHANGE UP (ref 3.8–10.5)
WBC # BLD: 8.17 K/UL — SIGNIFICANT CHANGE UP (ref 3.8–10.5)
WBC # BLD: 8.22 K/UL — SIGNIFICANT CHANGE UP (ref 3.8–10.5)
WBC # BLD: 8.28 K/UL — SIGNIFICANT CHANGE UP (ref 3.8–10.5)
WBC # BLD: 8.41 K/UL — SIGNIFICANT CHANGE UP (ref 3.8–10.5)
WBC # BLD: 8.42 K/UL — SIGNIFICANT CHANGE UP (ref 3.8–10.5)
WBC # BLD: 8.44 K/UL — SIGNIFICANT CHANGE UP (ref 3.8–10.5)
WBC # BLD: 8.48 K/UL — SIGNIFICANT CHANGE UP (ref 3.8–10.5)
WBC # BLD: 8.52 K/UL — SIGNIFICANT CHANGE UP (ref 3.8–10.5)
WBC # BLD: 8.59 K/UL — SIGNIFICANT CHANGE UP (ref 3.8–10.5)
WBC # BLD: 8.69 K/UL — SIGNIFICANT CHANGE UP (ref 3.8–10.5)
WBC # BLD: 8.7 K/UL — SIGNIFICANT CHANGE UP (ref 3.8–10.5)
WBC # BLD: 8.82 K/UL — SIGNIFICANT CHANGE UP (ref 3.8–10.5)
WBC # BLD: 8.91 K/UL — SIGNIFICANT CHANGE UP (ref 3.8–10.5)
WBC # BLD: 9.02 K/UL — SIGNIFICANT CHANGE UP (ref 3.8–10.5)
WBC # BLD: 9.02 K/UL — SIGNIFICANT CHANGE UP (ref 3.8–10.5)
WBC # BLD: 9.05 K/UL — SIGNIFICANT CHANGE UP (ref 3.8–10.5)
WBC # BLD: 9.25 K/UL — SIGNIFICANT CHANGE UP (ref 3.8–10.5)
WBC # BLD: 9.29 K/UL — SIGNIFICANT CHANGE UP (ref 3.8–10.5)
WBC # BLD: 9.31 K/UL — SIGNIFICANT CHANGE UP (ref 3.8–10.5)
WBC # BLD: 9.59 K/UL — SIGNIFICANT CHANGE UP (ref 3.8–10.5)
WBC # BLD: 9.7 K/UL — SIGNIFICANT CHANGE UP (ref 3.8–10.5)
WBC # BLD: 9.71 K/UL — SIGNIFICANT CHANGE UP (ref 3.8–10.5)
WBC # BLD: 9.75 K/UL — SIGNIFICANT CHANGE UP (ref 3.8–10.5)
WBC # BLD: 9.79 K/UL — SIGNIFICANT CHANGE UP (ref 3.8–10.5)
WBC # BLD: 9.79 K/UL — SIGNIFICANT CHANGE UP (ref 3.8–10.5)
WBC # FLD AUTO: 10 K/UL — SIGNIFICANT CHANGE UP (ref 3.8–10.5)
WBC # FLD AUTO: 10.04 K/UL — SIGNIFICANT CHANGE UP (ref 3.8–10.5)
WBC # FLD AUTO: 10.08 K/UL — SIGNIFICANT CHANGE UP (ref 3.8–10.5)
WBC # FLD AUTO: 10.27 K/UL — SIGNIFICANT CHANGE UP (ref 3.8–10.5)
WBC # FLD AUTO: 10.54 K/UL — HIGH (ref 3.8–10.5)
WBC # FLD AUTO: 10.57 K/UL — HIGH (ref 3.8–10.5)
WBC # FLD AUTO: 10.61 K/UL
WBC # FLD AUTO: 10.75 K/UL — HIGH (ref 3.8–10.5)
WBC # FLD AUTO: 11.27 K/UL — HIGH (ref 3.8–10.5)
WBC # FLD AUTO: 11.29 K/UL — HIGH (ref 3.8–10.5)
WBC # FLD AUTO: 11.84 K/UL — HIGH (ref 3.8–10.5)
WBC # FLD AUTO: 12.09 K/UL — HIGH (ref 3.8–10.5)
WBC # FLD AUTO: 12.15 K/UL — HIGH (ref 3.8–10.5)
WBC # FLD AUTO: 12.73 K/UL — HIGH (ref 3.8–10.5)
WBC # FLD AUTO: 12.9 K/UL — HIGH (ref 3.8–10.5)
WBC # FLD AUTO: 13.31 K/UL — HIGH (ref 3.8–10.5)
WBC # FLD AUTO: 13.34 K/UL — HIGH (ref 3.8–10.5)
WBC # FLD AUTO: 13.47 K/UL — HIGH (ref 3.8–10.5)
WBC # FLD AUTO: 13.75 K/UL — HIGH (ref 3.8–10.5)
WBC # FLD AUTO: 14.51 K/UL — HIGH (ref 3.8–10.5)
WBC # FLD AUTO: 14.67 K/UL — HIGH (ref 3.8–10.5)
WBC # FLD AUTO: 14.83 K/UL — HIGH (ref 3.8–10.5)
WBC # FLD AUTO: 15.81 K/UL — HIGH (ref 3.8–10.5)
WBC # FLD AUTO: 16.04 K/UL — HIGH (ref 3.8–10.5)
WBC # FLD AUTO: 16.33 K/UL — HIGH (ref 3.8–10.5)
WBC # FLD AUTO: 16.89 K/UL — HIGH (ref 3.8–10.5)
WBC # FLD AUTO: 17.21 K/UL — HIGH (ref 3.8–10.5)
WBC # FLD AUTO: 17.67 K/UL — HIGH (ref 3.8–10.5)
WBC # FLD AUTO: 4.62 K/UL — SIGNIFICANT CHANGE UP (ref 3.8–10.5)
WBC # FLD AUTO: 4.88 K/UL — SIGNIFICANT CHANGE UP (ref 3.8–10.5)
WBC # FLD AUTO: 4.9 K/UL — SIGNIFICANT CHANGE UP (ref 3.8–10.5)
WBC # FLD AUTO: 4.93 K/UL — SIGNIFICANT CHANGE UP (ref 3.8–10.5)
WBC # FLD AUTO: 4.95 K/UL — SIGNIFICANT CHANGE UP (ref 3.8–10.5)
WBC # FLD AUTO: 5.02 K/UL — SIGNIFICANT CHANGE UP (ref 3.8–10.5)
WBC # FLD AUTO: 5.05 K/UL — SIGNIFICANT CHANGE UP (ref 3.8–10.5)
WBC # FLD AUTO: 5.1 K/UL — SIGNIFICANT CHANGE UP (ref 3.8–10.5)
WBC # FLD AUTO: 5.11 K/UL — SIGNIFICANT CHANGE UP (ref 3.8–10.5)
WBC # FLD AUTO: 5.22 K/UL — SIGNIFICANT CHANGE UP (ref 3.8–10.5)
WBC # FLD AUTO: 5.22 K/UL — SIGNIFICANT CHANGE UP (ref 3.8–10.5)
WBC # FLD AUTO: 5.45 K/UL — SIGNIFICANT CHANGE UP (ref 3.8–10.5)
WBC # FLD AUTO: 5.56 K/UL — SIGNIFICANT CHANGE UP (ref 3.8–10.5)
WBC # FLD AUTO: 5.62 K/UL — SIGNIFICANT CHANGE UP (ref 3.8–10.5)
WBC # FLD AUTO: 5.69 K/UL — SIGNIFICANT CHANGE UP (ref 3.8–10.5)
WBC # FLD AUTO: 5.72 K/UL — SIGNIFICANT CHANGE UP (ref 3.8–10.5)
WBC # FLD AUTO: 5.76 K/UL — SIGNIFICANT CHANGE UP (ref 3.8–10.5)
WBC # FLD AUTO: 5.76 K/UL — SIGNIFICANT CHANGE UP (ref 3.8–10.5)
WBC # FLD AUTO: 5.8 K/UL — SIGNIFICANT CHANGE UP (ref 3.8–10.5)
WBC # FLD AUTO: 6.02 K/UL — SIGNIFICANT CHANGE UP (ref 3.8–10.5)
WBC # FLD AUTO: 6.03 K/UL — SIGNIFICANT CHANGE UP (ref 3.8–10.5)
WBC # FLD AUTO: 6.08 K/UL — SIGNIFICANT CHANGE UP (ref 3.8–10.5)
WBC # FLD AUTO: 6.21 K/UL — SIGNIFICANT CHANGE UP (ref 3.8–10.5)
WBC # FLD AUTO: 6.25 K/UL — SIGNIFICANT CHANGE UP (ref 3.8–10.5)
WBC # FLD AUTO: 6.31 K/UL — SIGNIFICANT CHANGE UP (ref 3.8–10.5)
WBC # FLD AUTO: 6.32 K/UL — SIGNIFICANT CHANGE UP (ref 3.8–10.5)
WBC # FLD AUTO: 6.86 K/UL — SIGNIFICANT CHANGE UP (ref 3.8–10.5)
WBC # FLD AUTO: 6.9 K/UL — SIGNIFICANT CHANGE UP (ref 3.8–10.5)
WBC # FLD AUTO: 6.93 K/UL — SIGNIFICANT CHANGE UP (ref 3.8–10.5)
WBC # FLD AUTO: 7.14 K/UL — SIGNIFICANT CHANGE UP (ref 3.8–10.5)
WBC # FLD AUTO: 7.21 K/UL — SIGNIFICANT CHANGE UP (ref 3.8–10.5)
WBC # FLD AUTO: 7.21 K/UL — SIGNIFICANT CHANGE UP (ref 3.8–10.5)
WBC # FLD AUTO: 7.25 K/UL — SIGNIFICANT CHANGE UP (ref 3.8–10.5)
WBC # FLD AUTO: 7.31 K/UL — SIGNIFICANT CHANGE UP (ref 3.8–10.5)
WBC # FLD AUTO: 7.38 K/UL — SIGNIFICANT CHANGE UP (ref 3.8–10.5)
WBC # FLD AUTO: 7.42 K/UL — SIGNIFICANT CHANGE UP (ref 3.8–10.5)
WBC # FLD AUTO: 7.46 K/UL — SIGNIFICANT CHANGE UP (ref 3.8–10.5)
WBC # FLD AUTO: 7.47 K/UL — SIGNIFICANT CHANGE UP (ref 3.8–10.5)
WBC # FLD AUTO: 7.47 K/UL — SIGNIFICANT CHANGE UP (ref 3.8–10.5)
WBC # FLD AUTO: 7.5 K/UL — SIGNIFICANT CHANGE UP (ref 3.8–10.5)
WBC # FLD AUTO: 7.53 K/UL — SIGNIFICANT CHANGE UP (ref 3.8–10.5)
WBC # FLD AUTO: 7.57 K/UL — SIGNIFICANT CHANGE UP (ref 3.8–10.5)
WBC # FLD AUTO: 7.58 K/UL — SIGNIFICANT CHANGE UP (ref 3.8–10.5)
WBC # FLD AUTO: 7.86 K/UL — SIGNIFICANT CHANGE UP (ref 3.8–10.5)
WBC # FLD AUTO: 7.88 K/UL — SIGNIFICANT CHANGE UP (ref 3.8–10.5)
WBC # FLD AUTO: 7.9 K/UL — SIGNIFICANT CHANGE UP (ref 3.8–10.5)
WBC # FLD AUTO: 7.94 K/UL — SIGNIFICANT CHANGE UP (ref 3.8–10.5)
WBC # FLD AUTO: 7.95 K/UL — SIGNIFICANT CHANGE UP (ref 3.8–10.5)
WBC # FLD AUTO: 7.96 K/UL — SIGNIFICANT CHANGE UP (ref 3.8–10.5)
WBC # FLD AUTO: 8.03 K/UL — SIGNIFICANT CHANGE UP (ref 3.8–10.5)
WBC # FLD AUTO: 8.07 K/UL — SIGNIFICANT CHANGE UP (ref 3.8–10.5)
WBC # FLD AUTO: 8.09 K/UL — SIGNIFICANT CHANGE UP (ref 3.8–10.5)
WBC # FLD AUTO: 8.1 K/UL — SIGNIFICANT CHANGE UP (ref 3.8–10.5)
WBC # FLD AUTO: 8.13 K/UL — SIGNIFICANT CHANGE UP (ref 3.8–10.5)
WBC # FLD AUTO: 8.14 K/UL — SIGNIFICANT CHANGE UP (ref 3.8–10.5)
WBC # FLD AUTO: 8.17 K/UL — SIGNIFICANT CHANGE UP (ref 3.8–10.5)
WBC # FLD AUTO: 8.22 K/UL — SIGNIFICANT CHANGE UP (ref 3.8–10.5)
WBC # FLD AUTO: 8.28 K/UL — SIGNIFICANT CHANGE UP (ref 3.8–10.5)
WBC # FLD AUTO: 8.41 K/UL — SIGNIFICANT CHANGE UP (ref 3.8–10.5)
WBC # FLD AUTO: 8.42 K/UL — SIGNIFICANT CHANGE UP (ref 3.8–10.5)
WBC # FLD AUTO: 8.44 K/UL — SIGNIFICANT CHANGE UP (ref 3.8–10.5)
WBC # FLD AUTO: 8.48 K/UL — SIGNIFICANT CHANGE UP (ref 3.8–10.5)
WBC # FLD AUTO: 8.52 K/UL — SIGNIFICANT CHANGE UP (ref 3.8–10.5)
WBC # FLD AUTO: 8.59 K/UL — SIGNIFICANT CHANGE UP (ref 3.8–10.5)
WBC # FLD AUTO: 8.69 K/UL — SIGNIFICANT CHANGE UP (ref 3.8–10.5)
WBC # FLD AUTO: 8.7 K/UL — SIGNIFICANT CHANGE UP (ref 3.8–10.5)
WBC # FLD AUTO: 8.82 K/UL — SIGNIFICANT CHANGE UP (ref 3.8–10.5)
WBC # FLD AUTO: 8.91 K/UL — SIGNIFICANT CHANGE UP (ref 3.8–10.5)
WBC # FLD AUTO: 9.02 K/UL — SIGNIFICANT CHANGE UP (ref 3.8–10.5)
WBC # FLD AUTO: 9.02 K/UL — SIGNIFICANT CHANGE UP (ref 3.8–10.5)
WBC # FLD AUTO: 9.05 K/UL — SIGNIFICANT CHANGE UP (ref 3.8–10.5)
WBC # FLD AUTO: 9.25 K/UL — SIGNIFICANT CHANGE UP (ref 3.8–10.5)
WBC # FLD AUTO: 9.29 K/UL — SIGNIFICANT CHANGE UP (ref 3.8–10.5)
WBC # FLD AUTO: 9.31 K/UL — SIGNIFICANT CHANGE UP (ref 3.8–10.5)
WBC # FLD AUTO: 9.59 K/UL — SIGNIFICANT CHANGE UP (ref 3.8–10.5)
WBC # FLD AUTO: 9.7 K/UL — SIGNIFICANT CHANGE UP (ref 3.8–10.5)
WBC # FLD AUTO: 9.71 K/UL — SIGNIFICANT CHANGE UP (ref 3.8–10.5)
WBC # FLD AUTO: 9.75 K/UL — SIGNIFICANT CHANGE UP (ref 3.8–10.5)
WBC # FLD AUTO: 9.79 K/UL — SIGNIFICANT CHANGE UP (ref 3.8–10.5)
WBC # FLD AUTO: 9.79 K/UL — SIGNIFICANT CHANGE UP (ref 3.8–10.5)
WBC UR QL: >50 /HPF (ref 0–5)
WBC UR QL: >50 /HPF — SIGNIFICANT CHANGE UP (ref 0–5)
WBC UR QL: ABNORMAL /HPF (ref 0–5)
WBC UR QL: SIGNIFICANT CHANGE UP /HPF (ref 0–5)
WBC UR QL: SIGNIFICANT CHANGE UP /HPF (ref 0–5)
WHITE BLOOD CELLS URINE: >720 /HPF
WHITE BLOOD CELLS URINE: >720 /HPF

## 2022-01-01 PROCEDURE — 86900 BLOOD TYPING SEROLOGIC ABO: CPT

## 2022-01-01 PROCEDURE — 99232 SBSQ HOSP IP/OBS MODERATE 35: CPT

## 2022-01-01 PROCEDURE — 51798 US URINE CAPACITY MEASURE: CPT

## 2022-01-01 PROCEDURE — 80053 COMPREHEN METABOLIC PANEL: CPT

## 2022-01-01 PROCEDURE — 87086 URINE CULTURE/COLONY COUNT: CPT

## 2022-01-01 PROCEDURE — 99285 EMERGENCY DEPT VISIT HI MDM: CPT

## 2022-01-01 PROCEDURE — 76856 US EXAM PELVIC COMPLETE: CPT | Mod: 26

## 2022-01-01 PROCEDURE — 85610 PROTHROMBIN TIME: CPT

## 2022-01-01 PROCEDURE — 94640 AIRWAY INHALATION TREATMENT: CPT

## 2022-01-01 PROCEDURE — 83550 IRON BINDING TEST: CPT

## 2022-01-01 PROCEDURE — 80048 BASIC METABOLIC PNL TOTAL CA: CPT

## 2022-01-01 PROCEDURE — 36000 PLACE NEEDLE IN VEIN: CPT

## 2022-01-01 PROCEDURE — 93010 ELECTROCARDIOGRAM REPORT: CPT | Mod: 77

## 2022-01-01 PROCEDURE — 33228 REMV&REPLC PM GEN DUAL LEAD: CPT

## 2022-01-01 PROCEDURE — 99233 SBSQ HOSP IP/OBS HIGH 50: CPT

## 2022-01-01 PROCEDURE — 93005 ELECTROCARDIOGRAM TRACING: CPT

## 2022-01-01 PROCEDURE — 97161 PT EVAL LOW COMPLEX 20 MIN: CPT

## 2022-01-01 PROCEDURE — 36415 COLL VENOUS BLD VENIPUNCTURE: CPT

## 2022-01-01 PROCEDURE — 84681 ASSAY OF C-PEPTIDE: CPT

## 2022-01-01 PROCEDURE — 87640 STAPH A DNA AMP PROBE: CPT

## 2022-01-01 PROCEDURE — 71250 CT THORAX DX C-: CPT | Mod: 26,MA

## 2022-01-01 PROCEDURE — 99222 1ST HOSP IP/OBS MODERATE 55: CPT

## 2022-01-01 PROCEDURE — 87635 SARS-COV-2 COVID-19 AMP PRB: CPT

## 2022-01-01 PROCEDURE — 71045 X-RAY EXAM CHEST 1 VIEW: CPT

## 2022-01-01 PROCEDURE — 74176 CT ABD & PELVIS W/O CONTRAST: CPT | Mod: MA

## 2022-01-01 PROCEDURE — 83036 HEMOGLOBIN GLYCOSYLATED A1C: CPT

## 2022-01-01 PROCEDURE — 99223 1ST HOSP IP/OBS HIGH 75: CPT

## 2022-01-01 PROCEDURE — 71045 X-RAY EXAM CHEST 1 VIEW: CPT | Mod: 26

## 2022-01-01 PROCEDURE — 85379 FIBRIN DEGRADATION QUANT: CPT

## 2022-01-01 PROCEDURE — 99221 1ST HOSP IP/OBS SF/LOW 40: CPT | Mod: GC

## 2022-01-01 PROCEDURE — 50387 CHANGE NEPHROURETERAL CATH: CPT

## 2022-01-01 PROCEDURE — 83690 ASSAY OF LIPASE: CPT

## 2022-01-01 PROCEDURE — 83970 ASSAY OF PARATHORMONE: CPT

## 2022-01-01 PROCEDURE — 50435 EXCHANGE NEPHROSTOMY CATH: CPT | Mod: RT

## 2022-01-01 PROCEDURE — 87040 BLOOD CULTURE FOR BACTERIA: CPT

## 2022-01-01 PROCEDURE — 82962 GLUCOSE BLOOD TEST: CPT

## 2022-01-01 PROCEDURE — 92610 EVALUATE SWALLOWING FUNCTION: CPT

## 2022-01-01 PROCEDURE — 71046 X-RAY EXAM CHEST 2 VIEWS: CPT

## 2022-01-01 PROCEDURE — 83880 ASSAY OF NATRIURETIC PEPTIDE: CPT

## 2022-01-01 PROCEDURE — 78582 LUNG VENTILAT&PERFUS IMAGING: CPT | Mod: 26

## 2022-01-01 PROCEDURE — 81001 URINALYSIS AUTO W/SCOPE: CPT

## 2022-01-01 PROCEDURE — 71250 CT THORAX DX C-: CPT | Mod: MA

## 2022-01-01 PROCEDURE — 80076 HEPATIC FUNCTION PANEL: CPT

## 2022-01-01 PROCEDURE — 84484 ASSAY OF TROPONIN QUANT: CPT

## 2022-01-01 PROCEDURE — 76000 FLUOROSCOPY <1 HR PHYS/QHP: CPT

## 2022-01-01 PROCEDURE — 99213 OFFICE O/P EST LOW 20 MIN: CPT

## 2022-01-01 PROCEDURE — 84702 CHORIONIC GONADOTROPIN TEST: CPT

## 2022-01-01 PROCEDURE — 82728 ASSAY OF FERRITIN: CPT

## 2022-01-01 PROCEDURE — 96374 THER/PROPH/DIAG INJ IV PUSH: CPT

## 2022-01-01 PROCEDURE — 93010 ELECTROCARDIOGRAM REPORT: CPT

## 2022-01-01 PROCEDURE — 82784 ASSAY IGA/IGD/IGG/IGM EACH: CPT

## 2022-01-01 PROCEDURE — 82330 ASSAY OF CALCIUM: CPT

## 2022-01-01 PROCEDURE — 83735 ASSAY OF MAGNESIUM: CPT

## 2022-01-01 PROCEDURE — 84165 PROTEIN E-PHORESIS SERUM: CPT

## 2022-01-01 PROCEDURE — 85730 THROMBOPLASTIN TIME PARTIAL: CPT

## 2022-01-01 PROCEDURE — 85025 COMPLETE CBC W/AUTO DIFF WBC: CPT

## 2022-01-01 PROCEDURE — U0005: CPT

## 2022-01-01 PROCEDURE — 86901 BLOOD TYPING SEROLOGIC RH(D): CPT

## 2022-01-01 PROCEDURE — 50435 EXCHANGE NEPHROSTOMY CATH: CPT

## 2022-01-01 PROCEDURE — 99232 SBSQ HOSP IP/OBS MODERATE 35: CPT | Mod: FS

## 2022-01-01 PROCEDURE — 93970 EXTREMITY STUDY: CPT

## 2022-01-01 PROCEDURE — 99222 1ST HOSP IP/OBS MODERATE 55: CPT | Mod: FS

## 2022-01-01 PROCEDURE — 87641 MR-STAPH DNA AMP PROBE: CPT

## 2022-01-01 PROCEDURE — C1769: CPT

## 2022-01-01 PROCEDURE — 76856 US EXAM PELVIC COMPLETE: CPT

## 2022-01-01 PROCEDURE — 84155 ASSAY OF PROTEIN SERUM: CPT

## 2022-01-01 PROCEDURE — 82570 ASSAY OF URINE CREATININE: CPT

## 2022-01-01 PROCEDURE — 84300 ASSAY OF URINE SODIUM: CPT

## 2022-01-01 PROCEDURE — 84100 ASSAY OF PHOSPHORUS: CPT

## 2022-01-01 PROCEDURE — 82550 ASSAY OF CK (CPK): CPT

## 2022-01-01 PROCEDURE — 93970 EXTREMITY STUDY: CPT | Mod: 26

## 2022-01-01 PROCEDURE — 85027 COMPLETE CBC AUTOMATED: CPT

## 2022-01-01 PROCEDURE — 83930 ASSAY OF BLOOD OSMOLALITY: CPT

## 2022-01-01 PROCEDURE — 82533 TOTAL CORTISOL: CPT

## 2022-01-01 PROCEDURE — C1729: CPT

## 2022-01-01 PROCEDURE — P9040: CPT

## 2022-01-01 PROCEDURE — 87186 SC STD MICRODIL/AGAR DIL: CPT

## 2022-01-01 PROCEDURE — 93010 ELECTROCARDIOGRAM REPORT: CPT | Mod: 76

## 2022-01-01 PROCEDURE — 99284 EMERGENCY DEPT VISIT MOD MDM: CPT | Mod: CS

## 2022-01-01 PROCEDURE — 78582 LUNG VENTILAT&PERFUS IMAGING: CPT

## 2022-01-01 PROCEDURE — 71046 X-RAY EXAM CHEST 2 VIEWS: CPT | Mod: 26

## 2022-01-01 PROCEDURE — 86337 INSULIN ANTIBODIES: CPT

## 2022-01-01 PROCEDURE — 99284 EMERGENCY DEPT VISIT MOD MDM: CPT | Mod: 25

## 2022-01-01 PROCEDURE — 99214 OFFICE O/P EST MOD 30 MIN: CPT

## 2022-01-01 PROCEDURE — 96360 HYDRATION IV INFUSION INIT: CPT

## 2022-01-01 PROCEDURE — 99285 EMERGENCY DEPT VISIT HI MDM: CPT | Mod: 25

## 2022-01-01 PROCEDURE — 73502 X-RAY EXAM HIP UNI 2-3 VIEWS: CPT | Mod: 26,LT

## 2022-01-01 PROCEDURE — 74176 CT ABD & PELVIS W/O CONTRAST: CPT | Mod: 26

## 2022-01-01 PROCEDURE — 84466 ASSAY OF TRANSFERRIN: CPT

## 2022-01-01 PROCEDURE — 99285 EMERGENCY DEPT VISIT HI MDM: CPT | Mod: GC

## 2022-01-01 PROCEDURE — 36430 TRANSFUSION BLD/BLD COMPNT: CPT

## 2022-01-01 PROCEDURE — 82040 ASSAY OF SERUM ALBUMIN: CPT

## 2022-01-01 PROCEDURE — 71250 CT THORAX DX C-: CPT | Mod: 26

## 2022-01-01 PROCEDURE — 86334 IMMUNOFIX E-PHORESIS SERUM: CPT

## 2022-01-01 PROCEDURE — 81025 URINE PREGNANCY TEST: CPT

## 2022-01-01 PROCEDURE — 80061 LIPID PANEL: CPT

## 2022-01-01 PROCEDURE — G1004: CPT

## 2022-01-01 PROCEDURE — 83605 ASSAY OF LACTIC ACID: CPT

## 2022-01-01 PROCEDURE — 74176 CT ABD & PELVIS W/O CONTRAST: CPT

## 2022-01-01 PROCEDURE — 99284 EMERGENCY DEPT VISIT MOD MDM: CPT

## 2022-01-01 PROCEDURE — 73620 X-RAY EXAM OF FOOT: CPT | Mod: 26,LT

## 2022-01-01 PROCEDURE — 84145 PROCALCITONIN (PCT): CPT

## 2022-01-01 PROCEDURE — 86923 COMPATIBILITY TEST ELECTRIC: CPT

## 2022-01-01 PROCEDURE — 83540 ASSAY OF IRON: CPT

## 2022-01-01 PROCEDURE — 74176 CT ABD & PELVIS W/O CONTRAST: CPT | Mod: 26,MA

## 2022-01-01 PROCEDURE — 86850 RBC ANTIBODY SCREEN: CPT

## 2022-01-01 PROCEDURE — 50430 NJX PX NFROSGRM &/URTRGRM: CPT

## 2022-01-01 PROCEDURE — 0225U NFCT DS DNA&RNA 21 SARSCOV2: CPT

## 2022-01-01 PROCEDURE — 82607 VITAMIN B-12: CPT

## 2022-01-01 PROCEDURE — 82947 ASSAY GLUCOSE BLOOD QUANT: CPT

## 2022-01-01 PROCEDURE — 99223 1ST HOSP IP/OBS HIGH 75: CPT | Mod: FS

## 2022-01-01 PROCEDURE — 83525 ASSAY OF INSULIN: CPT

## 2022-01-01 PROCEDURE — 99497 ADVNCD CARE PLAN 30 MIN: CPT | Mod: 25

## 2022-01-01 PROCEDURE — 99231 SBSQ HOSP IP/OBS SF/LOW 25: CPT

## 2022-01-01 PROCEDURE — 99215 OFFICE O/P EST HI 40 MIN: CPT

## 2022-01-01 PROCEDURE — 83519 RIA NONANTIBODY: CPT

## 2022-01-01 PROCEDURE — 76775 US EXAM ABDO BACK WALL LIM: CPT

## 2022-01-01 PROCEDURE — 71250 CT THORAX DX C-: CPT

## 2022-01-01 PROCEDURE — 87077 CULTURE AEROBIC IDENTIFY: CPT

## 2022-01-01 PROCEDURE — C1887: CPT

## 2022-01-01 PROCEDURE — 82009 KETONE BODYS QUAL: CPT

## 2022-01-01 PROCEDURE — 82310 ASSAY OF CALCIUM: CPT

## 2022-01-01 PROCEDURE — 83520 IMMUNOASSAY QUANT NOS NONAB: CPT

## 2022-01-01 PROCEDURE — 82553 CREATINE MB FRACTION: CPT

## 2022-01-01 PROCEDURE — 85045 AUTOMATED RETICULOCYTE COUNT: CPT

## 2022-01-01 PROCEDURE — 74176 CT ABD & PELVIS W/O CONTRAST: CPT | Mod: ME

## 2022-01-01 PROCEDURE — 99358 PROLONG SERVICE W/O CONTACT: CPT | Mod: NC

## 2022-01-01 PROCEDURE — 99205 OFFICE O/P NEW HI 60 MIN: CPT | Mod: 95

## 2022-01-01 PROCEDURE — 96361 HYDRATE IV INFUSION ADD-ON: CPT

## 2022-01-01 PROCEDURE — 82746 ASSAY OF FOLIC ACID SERUM: CPT

## 2022-01-01 PROCEDURE — U0003: CPT

## 2022-01-01 PROCEDURE — C1785: CPT

## 2022-01-01 PROCEDURE — 83010 ASSAY OF HAPTOGLOBIN QUANT: CPT

## 2022-01-01 PROCEDURE — 74425 UROGRAPHY ANTEGRADE RS&I: CPT

## 2022-01-01 PROCEDURE — 76775 US EXAM ABDO BACK WALL LIM: CPT | Mod: 26

## 2022-01-01 PROCEDURE — 99285 EMERGENCY DEPT VISIT HI MDM: CPT | Mod: CS

## 2022-01-01 PROCEDURE — 74176 CT ABD & PELVIS W/O CONTRAST: CPT | Mod: 26,ME

## 2022-01-01 PROCEDURE — 73620 X-RAY EXAM OF FOOT: CPT

## 2022-01-01 PROCEDURE — 84305 ASSAY OF SOMATOMEDIN: CPT

## 2022-01-01 RX ORDER — MORPHINE SULFATE 50 MG/1
5 CAPSULE, EXTENDED RELEASE ORAL
Refills: 0 | Status: DISCONTINUED | OUTPATIENT
Start: 2022-01-01 | End: 2022-01-01

## 2022-01-01 RX ORDER — DEXTROSE 50 % IN WATER 50 %
50 SYRINGE (ML) INTRAVENOUS ONCE
Refills: 0 | Status: COMPLETED | OUTPATIENT
Start: 2022-01-01 | End: 2022-01-01

## 2022-01-01 RX ORDER — MORPHINE SULFATE 50 MG/1
2.5 CAPSULE, EXTENDED RELEASE ORAL
Qty: 100 | Refills: 0
Start: 2022-01-01 | End: 2022-01-01

## 2022-01-01 RX ORDER — OLANZAPINE 15 MG/1
5 TABLET, FILM COATED ORAL AT BEDTIME
Refills: 0 | Status: DISCONTINUED | OUTPATIENT
Start: 2022-01-01 | End: 2022-01-01

## 2022-01-01 RX ORDER — ACETAMINOPHEN 500 MG
650 TABLET ORAL EVERY 6 HOURS
Refills: 0 | Status: DISCONTINUED | OUTPATIENT
Start: 2022-01-01 | End: 2022-01-01

## 2022-01-01 RX ORDER — CEFEPIME 1 G/1
1000 INJECTION, POWDER, FOR SOLUTION INTRAMUSCULAR; INTRAVENOUS ONCE
Refills: 0 | Status: COMPLETED | OUTPATIENT
Start: 2022-01-01 | End: 2022-01-01

## 2022-01-01 RX ORDER — MORPHINE SULFATE 50 MG/1
2 CAPSULE, EXTENDED RELEASE ORAL
Refills: 0 | Status: DISCONTINUED | OUTPATIENT
Start: 2022-01-01 | End: 2022-01-01

## 2022-01-01 RX ORDER — ACETAMINOPHEN 500 MG
650 TABLET ORAL ONCE
Refills: 0 | Status: COMPLETED | OUTPATIENT
Start: 2022-01-01 | End: 2022-01-01

## 2022-01-01 RX ORDER — MORPHINE SULFATE 50 MG/1
2 CAPSULE, EXTENDED RELEASE ORAL EVERY 4 HOURS
Refills: 0 | Status: DISCONTINUED | OUTPATIENT
Start: 2022-01-01 | End: 2022-01-01

## 2022-01-01 RX ORDER — SIMETHICONE 80 MG/1
80 TABLET, CHEWABLE ORAL EVERY 6 HOURS
Qty: 120 | Refills: 3 | Status: ACTIVE | COMMUNITY
Start: 2022-01-01 | End: 1900-01-01

## 2022-01-01 RX ORDER — ATORVASTATIN CALCIUM 80 MG/1
0 TABLET, FILM COATED ORAL
Qty: 0 | Refills: 0 | DISCHARGE

## 2022-01-01 RX ORDER — PANTOPRAZOLE SODIUM 20 MG/1
40 TABLET, DELAYED RELEASE ORAL ONCE
Refills: 0 | Status: COMPLETED | OUTPATIENT
Start: 2022-01-01 | End: 2022-01-01

## 2022-01-01 RX ORDER — BUDESONIDE AND FORMOTEROL FUMARATE DIHYDRATE 160; 4.5 UG/1; UG/1
2 AEROSOL RESPIRATORY (INHALATION)
Refills: 0 | Status: DISCONTINUED | OUTPATIENT
Start: 2022-01-01 | End: 2022-01-01

## 2022-01-01 RX ORDER — BUDESONIDE AND FORMOTEROL FUMARATE DIHYDRATE 160; 4.5 UG/1; UG/1
2 AEROSOL RESPIRATORY (INHALATION)
Qty: 0 | Refills: 0 | DISCHARGE

## 2022-01-01 RX ORDER — SODIUM CHLORIDE 9 MG/ML
1200 INJECTION INTRAMUSCULAR; INTRAVENOUS; SUBCUTANEOUS ONCE
Refills: 0 | Status: COMPLETED | OUTPATIENT
Start: 2022-01-01 | End: 2022-01-01

## 2022-01-01 RX ORDER — ENOXAPARIN SODIUM 100 MG/ML
40 INJECTION SUBCUTANEOUS EVERY 24 HOURS
Refills: 0 | Status: DISCONTINUED | OUTPATIENT
Start: 2022-01-01 | End: 2022-01-01

## 2022-01-01 RX ORDER — ACETAMINOPHEN 500 MG
325 TABLET ORAL EVERY 6 HOURS
Refills: 0 | Status: DISCONTINUED | OUTPATIENT
Start: 2022-01-01 | End: 2022-01-01

## 2022-01-01 RX ORDER — SODIUM CHLORIDE 9 MG/ML
1000 INJECTION INTRAMUSCULAR; INTRAVENOUS; SUBCUTANEOUS
Refills: 0 | Status: DISCONTINUED | OUTPATIENT
Start: 2022-01-01 | End: 2022-01-01

## 2022-01-01 RX ORDER — CEFEPIME 1 G/1
1000 INJECTION, POWDER, FOR SOLUTION INTRAMUSCULAR; INTRAVENOUS EVERY 8 HOURS
Refills: 0 | Status: DISCONTINUED | OUTPATIENT
Start: 2022-01-01 | End: 2022-01-01

## 2022-01-01 RX ORDER — ACETAMINOPHEN 500 MG
325 TABLET ORAL ONCE
Refills: 0 | Status: COMPLETED | OUTPATIENT
Start: 2022-01-01 | End: 2022-01-01

## 2022-01-01 RX ORDER — LIDOCAINE 4 G/100G
1 CREAM TOPICAL EVERY 24 HOURS
Refills: 0 | Status: DISCONTINUED | OUTPATIENT
Start: 2022-01-01 | End: 2022-01-01

## 2022-01-01 RX ORDER — CEFTRIAXONE 500 MG/1
1000 INJECTION, POWDER, FOR SOLUTION INTRAMUSCULAR; INTRAVENOUS EVERY 24 HOURS
Refills: 0 | Status: DISCONTINUED | OUTPATIENT
Start: 2022-01-01 | End: 2022-01-01

## 2022-01-01 RX ORDER — MEROPENEM 1 G/30ML
1000 INJECTION INTRAVENOUS ONCE
Refills: 0 | Status: COMPLETED | OUTPATIENT
Start: 2022-01-01 | End: 2022-01-01

## 2022-01-01 RX ORDER — CEFPODOXIME PROXETIL 100 MG
1 TABLET ORAL
Qty: 6 | Refills: 0
Start: 2022-01-01 | End: 2022-01-01

## 2022-01-01 RX ORDER — PHENAZOPYRIDINE HCL 100 MG
200 TABLET ORAL ONCE
Refills: 0 | Status: COMPLETED | OUTPATIENT
Start: 2022-01-01 | End: 2022-01-01

## 2022-01-01 RX ORDER — PANTOPRAZOLE SODIUM 20 MG/1
40 TABLET, DELAYED RELEASE ORAL
Refills: 0 | Status: COMPLETED | OUTPATIENT
Start: 2022-01-01 | End: 2022-01-01

## 2022-01-01 RX ORDER — ALBUTEROL 90 UG/1
2 AEROSOL, METERED ORAL EVERY 6 HOURS
Refills: 0 | Status: DISCONTINUED | OUTPATIENT
Start: 2022-01-01 | End: 2022-01-01

## 2022-01-01 RX ORDER — CEFEPIME 1 G/1
1000 INJECTION, POWDER, FOR SOLUTION INTRAMUSCULAR; INTRAVENOUS EVERY 12 HOURS
Refills: 0 | Status: DISCONTINUED | OUTPATIENT
Start: 2022-01-01 | End: 2022-01-01

## 2022-01-01 RX ORDER — MORPHINE SULFATE 50 MG/1
5 CAPSULE, EXTENDED RELEASE ORAL EVERY 4 HOURS
Refills: 0 | Status: DISCONTINUED | OUTPATIENT
Start: 2022-01-01 | End: 2022-01-01

## 2022-01-01 RX ORDER — ACETAMINOPHEN 500 MG
325 TABLET ORAL EVERY 4 HOURS
Refills: 0 | Status: DISCONTINUED | OUTPATIENT
Start: 2022-01-01 | End: 2022-01-01

## 2022-01-01 RX ORDER — ACETAMINOPHEN 500 MG
1000 TABLET ORAL ONCE
Refills: 0 | Status: DISCONTINUED | OUTPATIENT
Start: 2022-01-01 | End: 2022-01-01

## 2022-01-01 RX ORDER — LANOLIN ALCOHOL/MO/W.PET/CERES
3 CREAM (GRAM) TOPICAL AT BEDTIME
Refills: 0 | Status: DISCONTINUED | OUTPATIENT
Start: 2022-01-01 | End: 2022-01-01

## 2022-01-01 RX ORDER — BUDESONIDE AND FORMOTEROL FUMARATE DIHYDRATE 160; 4.5 UG/1; UG/1
0 AEROSOL RESPIRATORY (INHALATION)
Qty: 0 | Refills: 5 | DISCHARGE

## 2022-01-01 RX ORDER — IRON POLYSACCHARIDE COMPLEX 150 MG
1 CAPSULE ORAL
Qty: 30 | Refills: 0
Start: 2022-01-01 | End: 2022-01-01

## 2022-01-01 RX ORDER — KETOROLAC TROMETHAMINE 30 MG/ML
15 SYRINGE (ML) INJECTION ONCE
Refills: 0 | Status: DISCONTINUED | OUTPATIENT
Start: 2022-01-01 | End: 2022-01-01

## 2022-01-01 RX ORDER — AMOXICILLIN 250 MG/5ML
500 SUSPENSION, RECONSTITUTED, ORAL (ML) ORAL EVERY 8 HOURS
Refills: 0 | Status: DISCONTINUED | OUTPATIENT
Start: 2022-01-01 | End: 2022-01-01

## 2022-01-01 RX ORDER — SENNA PLUS 8.6 MG/1
2 TABLET ORAL
Qty: 60 | Refills: 0
Start: 2022-01-01 | End: 2022-01-01

## 2022-01-01 RX ORDER — FOLIC ACID 0.8 MG
1 TABLET ORAL DAILY
Refills: 0 | Status: DISCONTINUED | OUTPATIENT
Start: 2022-01-01 | End: 2022-01-01

## 2022-01-01 RX ORDER — MORPHINE SULFATE 50 MG/1
1 CAPSULE, EXTENDED RELEASE ORAL ONCE
Refills: 0 | Status: DISCONTINUED | OUTPATIENT
Start: 2022-01-01 | End: 2022-01-01

## 2022-01-01 RX ORDER — DIPHENHYDRAMINE HCL 50 MG
25 CAPSULE ORAL EVERY 4 HOURS
Refills: 0 | Status: DISCONTINUED | OUTPATIENT
Start: 2022-01-01 | End: 2022-01-01

## 2022-01-01 RX ORDER — PAMIDRONATE DISODIUM 9 MG/ML
60 INJECTION, SOLUTION INTRAVENOUS ONCE
Refills: 0 | Status: COMPLETED | OUTPATIENT
Start: 2022-01-01 | End: 2022-01-01

## 2022-01-01 RX ORDER — TRAMADOL HYDROCHLORIDE 50 MG/1
25 TABLET ORAL ONCE
Refills: 0 | Status: DISCONTINUED | OUTPATIENT
Start: 2022-01-01 | End: 2022-01-01

## 2022-01-01 RX ORDER — ONDANSETRON 8 MG/1
4 TABLET, FILM COATED ORAL EVERY 8 HOURS
Refills: 0 | Status: DISCONTINUED | OUTPATIENT
Start: 2022-01-01 | End: 2022-01-01

## 2022-01-01 RX ORDER — FERROUS SULFATE 325(65) MG
1 TABLET ORAL
Qty: 90 | Refills: 0
Start: 2022-01-01 | End: 2022-01-01

## 2022-01-01 RX ORDER — CEFAZOLIN SODIUM 1 G
1000 VIAL (EA) INJECTION ONCE
Refills: 0 | Status: DISCONTINUED | OUTPATIENT
Start: 2022-01-01 | End: 2022-01-01

## 2022-01-01 RX ORDER — CEFTRIAXONE 500 MG/1
1000 INJECTION, POWDER, FOR SOLUTION INTRAMUSCULAR; INTRAVENOUS ONCE
Refills: 0 | Status: COMPLETED | OUTPATIENT
Start: 2022-01-01 | End: 2022-01-01

## 2022-01-01 RX ORDER — FENTANYL CITRATE 50 UG/ML
1 INJECTION INTRAVENOUS
Refills: 0 | Status: DISCONTINUED | OUTPATIENT
Start: 2022-01-01 | End: 2022-01-01

## 2022-01-01 RX ORDER — SIMETHICONE 80 MG/1
1 TABLET, CHEWABLE ORAL
Qty: 0 | Refills: 0 | DISCHARGE
Start: 2022-01-01

## 2022-01-01 RX ORDER — MORPHINE SULFATE 50 MG/1
2.5 CAPSULE, EXTENDED RELEASE ORAL
Qty: 75 | Refills: 0
Start: 2022-01-01 | End: 2022-01-01

## 2022-01-01 RX ORDER — SODIUM CHLORIDE 9 MG/ML
1000 INJECTION INTRAMUSCULAR; INTRAVENOUS; SUBCUTANEOUS ONCE
Refills: 0 | Status: COMPLETED | OUTPATIENT
Start: 2022-01-01 | End: 2022-01-01

## 2022-01-01 RX ORDER — ONDANSETRON 8 MG/1
4 TABLET, FILM COATED ORAL ONCE
Refills: 0 | Status: DISCONTINUED | OUTPATIENT
Start: 2022-01-01 | End: 2022-01-01

## 2022-01-01 RX ORDER — DEXTROSE 50 % IN WATER 50 %
12.5 SYRINGE (ML) INTRAVENOUS ONCE
Refills: 0 | Status: DISCONTINUED | OUTPATIENT
Start: 2022-01-01 | End: 2022-01-01

## 2022-01-01 RX ORDER — AMPICILLIN SODIUM AND SULBACTAM SODIUM 250; 125 MG/ML; MG/ML
INJECTION, POWDER, FOR SUSPENSION INTRAMUSCULAR; INTRAVENOUS
Refills: 0 | Status: DISCONTINUED | OUTPATIENT
Start: 2022-01-01 | End: 2022-01-01

## 2022-01-01 RX ORDER — CHOLECALCIFEROL (VITAMIN D3) 125 MCG
2000 CAPSULE ORAL
Qty: 30 | Refills: 0
Start: 2022-01-01 | End: 2022-01-01

## 2022-01-01 RX ORDER — CEFEPIME 1 G/1
1000 INJECTION, POWDER, FOR SOLUTION INTRAMUSCULAR; INTRAVENOUS EVERY 8 HOURS
Refills: 0 | Status: COMPLETED | OUTPATIENT
Start: 2022-01-01 | End: 2022-01-01

## 2022-01-01 RX ORDER — IRON POLYSACCHARIDE COMPLEX 150 MG
1 CAPSULE ORAL
Qty: 0 | Refills: 0 | DISCHARGE

## 2022-01-01 RX ORDER — NYSTATIN 100000 [USP'U]/ML
100000 SUSPENSION ORAL 4 TIMES DAILY
Qty: 600 | Refills: 1 | Status: ACTIVE | COMMUNITY
Start: 2022-01-01 | End: 1900-01-01

## 2022-01-01 RX ORDER — CHOLECALCIFEROL (VITAMIN D3) 125 MCG
1000 CAPSULE ORAL DAILY
Refills: 0 | Status: DISCONTINUED | OUTPATIENT
Start: 2022-01-01 | End: 2022-01-01

## 2022-01-01 RX ORDER — SODIUM CHLORIDE 9 MG/ML
1000 INJECTION, SOLUTION INTRAVENOUS
Refills: 0 | Status: DISCONTINUED | OUTPATIENT
Start: 2022-01-01 | End: 2022-01-01

## 2022-01-01 RX ORDER — CEFAZOLIN SODIUM 1 G
1000 VIAL (EA) INJECTION ONCE
Refills: 0 | Status: COMPLETED | OUTPATIENT
Start: 2022-01-01 | End: 2022-01-01

## 2022-01-01 RX ORDER — MORPHINE SULFATE 50 MG/1
0.5 CAPSULE, EXTENDED RELEASE ORAL
Qty: 6 | Refills: 0
Start: 2022-01-01 | End: 2022-01-01

## 2022-01-01 RX ORDER — CEFTRIAXONE 500 MG/1
1000 INJECTION, POWDER, FOR SOLUTION INTRAMUSCULAR; INTRAVENOUS ONCE
Refills: 0 | Status: DISCONTINUED | OUTPATIENT
Start: 2022-01-01 | End: 2022-01-01

## 2022-01-01 RX ORDER — HEPARIN SODIUM 5000 [USP'U]/ML
5000 INJECTION INTRAVENOUS; SUBCUTANEOUS EVERY 12 HOURS
Refills: 0 | Status: DISCONTINUED | OUTPATIENT
Start: 2022-01-01 | End: 2022-01-01

## 2022-01-01 RX ORDER — CIPROFLOXACIN LACTATE 400MG/40ML
400 VIAL (ML) INTRAVENOUS ONCE
Refills: 0 | Status: DISCONTINUED | OUTPATIENT
Start: 2022-01-01 | End: 2022-01-01

## 2022-01-01 RX ORDER — MORPHINE SULFATE 50 MG/1
1.25 CAPSULE, EXTENDED RELEASE ORAL
Qty: 50 | Refills: 0
Start: 2022-01-01 | End: 2022-01-01

## 2022-01-01 RX ORDER — SODIUM CHLORIDE 9 MG/ML
500 INJECTION INTRAMUSCULAR; INTRAVENOUS; SUBCUTANEOUS ONCE
Refills: 0 | Status: COMPLETED | OUTPATIENT
Start: 2022-01-01 | End: 2022-01-01

## 2022-01-01 RX ORDER — SENNA PLUS 8.6 MG/1
2 TABLET ORAL AT BEDTIME
Refills: 0 | Status: DISCONTINUED | OUTPATIENT
Start: 2022-01-01 | End: 2022-01-01

## 2022-01-01 RX ORDER — POLYETHYLENE GLYCOL 3350 17 G/17G
17 POWDER, FOR SOLUTION ORAL
Qty: 510 | Refills: 0
Start: 2022-01-01 | End: 2022-01-01

## 2022-01-01 RX ORDER — MORPHINE SULFATE 50 MG/1
7.5 CAPSULE, EXTENDED RELEASE ORAL EVERY 6 HOURS
Refills: 0 | Status: DISCONTINUED | OUTPATIENT
Start: 2022-01-01 | End: 2022-01-01

## 2022-01-01 RX ORDER — CEFEPIME 1 G/1
1000 INJECTION, POWDER, FOR SOLUTION INTRAMUSCULAR; INTRAVENOUS EVERY 12 HOURS
Refills: 0 | Status: COMPLETED | OUTPATIENT
Start: 2022-01-01 | End: 2022-01-01

## 2022-01-01 RX ORDER — INSULIN LISPRO 100/ML
VIAL (ML) SUBCUTANEOUS AT BEDTIME
Refills: 0 | Status: DISCONTINUED | OUTPATIENT
Start: 2022-01-01 | End: 2022-01-01

## 2022-01-01 RX ORDER — HEPARIN SODIUM 5000 [USP'U]/ML
5000 INJECTION INTRAVENOUS; SUBCUTANEOUS EVERY 8 HOURS
Refills: 0 | Status: DISCONTINUED | OUTPATIENT
Start: 2022-01-01 | End: 2022-01-01

## 2022-01-01 RX ORDER — SIMETHICONE 80 MG/1
80 TABLET, CHEWABLE ORAL
Refills: 0 | Status: DISCONTINUED | OUTPATIENT
Start: 2022-01-01 | End: 2022-01-01

## 2022-01-01 RX ORDER — CHLORHEXIDINE GLUCONATE 213 G/1000ML
1 SOLUTION TOPICAL DAILY
Refills: 0 | Status: DISCONTINUED | OUTPATIENT
Start: 2022-01-01 | End: 2022-01-01

## 2022-01-01 RX ORDER — CEFUROXIME AXETIL 250 MG
1 TABLET ORAL
Qty: 6 | Refills: 0
Start: 2022-01-01 | End: 2022-01-01

## 2022-01-01 RX ORDER — CEFEPIME 1 G/1
INJECTION, POWDER, FOR SOLUTION INTRAMUSCULAR; INTRAVENOUS
Refills: 0 | Status: DISCONTINUED | OUTPATIENT
Start: 2022-01-01 | End: 2022-01-01

## 2022-01-01 RX ORDER — SODIUM CHLORIDE 9 MG/ML
1000 INJECTION INTRAMUSCULAR; INTRAVENOUS; SUBCUTANEOUS
Refills: 0 | Status: COMPLETED | OUTPATIENT
Start: 2022-01-01 | End: 2022-01-01

## 2022-01-01 RX ORDER — AMOXICILLIN AND CLAVULANATE POTASSIUM 500; 125 MG/1; MG/1
500-125 TABLET, FILM COATED ORAL
Qty: 10 | Refills: 0 | Status: DISCONTINUED | COMMUNITY
Start: 2022-01-01 | End: 2022-01-01

## 2022-01-01 RX ORDER — FERROUS SULFATE 325(65) MG
325 TABLET ORAL THREE TIMES A DAY
Refills: 0 | Status: DISCONTINUED | OUTPATIENT
Start: 2022-01-01 | End: 2022-01-01

## 2022-01-01 RX ORDER — DOCUSATE SODIUM 100 MG/1
100 CAPSULE, LIQUID FILLED ORAL
Qty: 90 | Refills: 2 | Status: ACTIVE | COMMUNITY
Start: 2022-01-01

## 2022-01-01 RX ORDER — DIPHENHYDRAMINE HCL 50 MG
25 CAPSULE ORAL ONCE
Refills: 0 | Status: COMPLETED | OUTPATIENT
Start: 2022-01-01 | End: 2022-01-01

## 2022-01-01 RX ORDER — MORPHINE SULFATE 50 MG/1
1 CAPSULE, EXTENDED RELEASE ORAL
Refills: 0 | Status: DISCONTINUED | OUTPATIENT
Start: 2022-01-01 | End: 2022-01-01

## 2022-01-01 RX ORDER — OLANZAPINE 15 MG/1
5 TABLET, FILM COATED ORAL DAILY
Refills: 0 | Status: DISCONTINUED | OUTPATIENT
Start: 2022-01-01 | End: 2022-01-01

## 2022-01-01 RX ORDER — ACETAMINOPHEN 500 MG
2 TABLET ORAL
Qty: 24 | Refills: 0
Start: 2022-01-01 | End: 2022-01-01

## 2022-01-01 RX ORDER — PANTOPRAZOLE SODIUM 20 MG/1
40 TABLET, DELAYED RELEASE ORAL
Refills: 0 | Status: DISCONTINUED | OUTPATIENT
Start: 2022-01-01 | End: 2022-01-01

## 2022-01-01 RX ORDER — TRAMADOL HYDROCHLORIDE 50 MG/1
50 TABLET ORAL EVERY 6 HOURS
Refills: 0 | Status: DISCONTINUED | OUTPATIENT
Start: 2022-01-01 | End: 2022-01-01

## 2022-01-01 RX ORDER — CHLORHEXIDINE GLUCONATE 213 G/1000ML
1 SOLUTION TOPICAL
Refills: 0 | Status: DISCONTINUED | OUTPATIENT
Start: 2022-01-01 | End: 2022-01-01

## 2022-01-01 RX ORDER — CHOLECALCIFEROL (VITAMIN D3) 125 MCG
1 CAPSULE ORAL
Qty: 0 | Refills: 0 | DISCHARGE

## 2022-01-01 RX ORDER — OLANZAPINE 15 MG/1
0 TABLET, FILM COATED ORAL
Qty: 0 | Refills: 0 | DISCHARGE

## 2022-01-01 RX ORDER — PIPERACILLIN AND TAZOBACTAM 4; .5 G/20ML; G/20ML
3.38 INJECTION, POWDER, LYOPHILIZED, FOR SOLUTION INTRAVENOUS EVERY 8 HOURS
Refills: 0 | Status: DISCONTINUED | OUTPATIENT
Start: 2022-01-01 | End: 2022-01-01

## 2022-01-01 RX ORDER — LACTULOSE 10 G/15ML
10 SOLUTION ORAL ONCE
Refills: 0 | Status: COMPLETED | OUTPATIENT
Start: 2022-01-01 | End: 2022-01-01

## 2022-01-01 RX ORDER — ACETAMINOPHEN 500 MG
1000 TABLET ORAL ONCE
Refills: 0 | Status: COMPLETED | OUTPATIENT
Start: 2022-01-01 | End: 2022-01-01

## 2022-01-01 RX ORDER — POLYETHYLENE GLYCOL 3350 17 G/17G
17 POWDER, FOR SOLUTION ORAL ONCE
Refills: 0 | Status: COMPLETED | OUTPATIENT
Start: 2022-01-01 | End: 2022-01-01

## 2022-01-01 RX ORDER — DIAZEPAM 5 MG
5 TABLET ORAL ONCE
Refills: 0 | Status: DISCONTINUED | OUTPATIENT
Start: 2022-01-01 | End: 2022-01-01

## 2022-01-01 RX ORDER — CYCLOBENZAPRINE HYDROCHLORIDE 10 MG/1
5 TABLET, FILM COATED ORAL THREE TIMES A DAY
Refills: 0 | Status: DISCONTINUED | OUTPATIENT
Start: 2022-01-01 | End: 2022-01-01

## 2022-01-01 RX ORDER — CEFEPIME 1 G/1
1000 INJECTION, POWDER, FOR SOLUTION INTRAMUSCULAR; INTRAVENOUS ONCE
Refills: 0 | Status: DISCONTINUED | OUTPATIENT
Start: 2022-01-01 | End: 2022-01-01

## 2022-01-01 RX ORDER — MORPHINE SULFATE 10 MG/5ML
10 SOLUTION ORAL
Qty: 500 | Refills: 0 | Status: ACTIVE | COMMUNITY
Start: 2022-01-01 | End: 1900-01-01

## 2022-01-01 RX ORDER — DIAZEPAM 5 MG
5 TABLET ORAL THREE TIMES A DAY
Refills: 0 | Status: DISCONTINUED | OUTPATIENT
Start: 2022-01-01 | End: 2022-01-01

## 2022-01-01 RX ORDER — SODIUM CHLORIDE 9 MG/ML
500 INJECTION, SOLUTION INTRAVENOUS
Refills: 0 | Status: DISCONTINUED | OUTPATIENT
Start: 2022-01-01 | End: 2022-01-01

## 2022-01-01 RX ORDER — VANCOMYCIN HCL 1 G
500 VIAL (EA) INTRAVENOUS ONCE
Refills: 0 | Status: COMPLETED | OUTPATIENT
Start: 2022-01-01 | End: 2022-01-01

## 2022-01-01 RX ORDER — ONDANSETRON 8 MG/1
4 TABLET, FILM COATED ORAL
Refills: 0 | Status: DISCONTINUED | OUTPATIENT
Start: 2022-01-01 | End: 2022-01-01

## 2022-01-01 RX ORDER — ACETAMINOPHEN 500 MG
0 TABLET ORAL
Qty: 0 | Refills: 0 | DISCHARGE

## 2022-01-01 RX ORDER — FERROUS SULFATE TAB EC 324 MG (65 MG FE EQUIVALENT) 324 (65 FE) MG
324 (65 FE) TABLET DELAYED RESPONSE ORAL
Qty: 60 | Refills: 0 | Status: ACTIVE | COMMUNITY
Start: 2022-01-01

## 2022-01-01 RX ORDER — POLYETHYLENE GLYCOL 3350 17 G/17G
17 POWDER, FOR SOLUTION ORAL DAILY
Refills: 0 | Status: DISCONTINUED | OUTPATIENT
Start: 2022-01-01 | End: 2022-01-01

## 2022-01-01 RX ORDER — COLCHICINE 0.6 MG
0.6 TABLET ORAL ONCE
Refills: 0 | Status: DISCONTINUED | OUTPATIENT
Start: 2022-01-01 | End: 2022-01-01

## 2022-01-01 RX ORDER — DEXTROSE 10 % IN WATER 10 %
1000 INTRAVENOUS SOLUTION INTRAVENOUS
Refills: 0 | Status: DISCONTINUED | OUTPATIENT
Start: 2022-01-01 | End: 2022-01-01

## 2022-01-01 RX ORDER — ACETAMINOPHEN 500 MG
650 TABLET ORAL EVERY 6 HOURS
Refills: 0 | Status: COMPLETED | OUTPATIENT
Start: 2022-01-01 | End: 2022-01-01

## 2022-01-01 RX ORDER — ACETAMINOPHEN 500 MG
650 TABLET ORAL EVERY 4 HOURS
Refills: 0 | Status: DISCONTINUED | OUTPATIENT
Start: 2022-01-01 | End: 2022-01-01

## 2022-01-01 RX ORDER — ATORVASTATIN CALCIUM 80 MG/1
40 TABLET, FILM COATED ORAL AT BEDTIME
Refills: 0 | Status: DISCONTINUED | OUTPATIENT
Start: 2022-01-01 | End: 2022-01-01

## 2022-01-01 RX ORDER — ACETAMINOPHEN 500 MG
2 TABLET ORAL
Qty: 0 | Refills: 0 | DISCHARGE
Start: 2022-01-01

## 2022-01-01 RX ORDER — TRAMADOL HYDROCHLORIDE 50 MG/1
50 TABLET ORAL ONCE
Refills: 0 | Status: DISCONTINUED | OUTPATIENT
Start: 2022-01-01 | End: 2022-01-01

## 2022-01-01 RX ORDER — DOCUSATE SODIUM 100 MG/1
100 CAPSULE, LIQUID FILLED ORAL 3 TIMES DAILY
Qty: 90 | Refills: 3 | Status: ACTIVE | COMMUNITY
Start: 2022-01-01 | End: 1900-01-01

## 2022-01-01 RX ORDER — MORPHINE SULFATE 50 MG/1
4 CAPSULE, EXTENDED RELEASE ORAL ONCE
Refills: 0 | Status: DISCONTINUED | OUTPATIENT
Start: 2022-01-01 | End: 2022-01-01

## 2022-01-01 RX ORDER — LIDOCAINE 4 G/100G
1 CREAM TOPICAL
Qty: 30 | Refills: 0
Start: 2022-01-01 | End: 2022-01-01

## 2022-01-01 RX ORDER — ASCORBIC ACID 60 MG
500 TABLET,CHEWABLE ORAL DAILY
Refills: 0 | Status: DISCONTINUED | OUTPATIENT
Start: 2022-01-01 | End: 2022-01-01

## 2022-01-01 RX ORDER — MORPHINE SULFATE 50 MG/1
7.5 CAPSULE, EXTENDED RELEASE ORAL
Refills: 0 | Status: DISCONTINUED | OUTPATIENT
Start: 2022-01-01 | End: 2022-01-01

## 2022-01-01 RX ORDER — CHOLECALCIFEROL (VITAMIN D3) 125 MCG
0 CAPSULE ORAL
Qty: 0 | Refills: 0 | DISCHARGE

## 2022-01-01 RX ORDER — DEXTROSE 50 % IN WATER 50 %
25 SYRINGE (ML) INTRAVENOUS ONCE
Refills: 0 | Status: DISCONTINUED | OUTPATIENT
Start: 2022-01-01 | End: 2022-01-01

## 2022-01-01 RX ORDER — POLYETHYLENE GLYCOL 3350 17 G/17G
17 POWDER, FOR SOLUTION ORAL
Qty: 0 | Refills: 0 | DISCHARGE
Start: 2022-01-01

## 2022-01-01 RX ORDER — LACTOBACILLUS ACIDOPHILUS 100MM CELL
1 CAPSULE ORAL DAILY
Refills: 0 | Status: COMPLETED | OUTPATIENT
Start: 2022-01-01 | End: 2022-01-01

## 2022-01-01 RX ORDER — CEFPODOXIME PROXETIL 100 MG
1 TABLET ORAL
Qty: 20 | Refills: 0
Start: 2022-01-01 | End: 2022-01-01

## 2022-01-01 RX ORDER — SIMETHICONE 80 MG/1
80 TABLET, CHEWABLE ORAL DAILY
Refills: 0 | Status: DISCONTINUED | OUTPATIENT
Start: 2022-01-01 | End: 2022-01-01

## 2022-01-01 RX ORDER — CHOLECALCIFEROL (VITAMIN D3) 125 MCG
2000 CAPSULE ORAL DAILY
Refills: 0 | Status: DISCONTINUED | OUTPATIENT
Start: 2022-01-01 | End: 2022-01-01

## 2022-01-01 RX ORDER — AMOXICILLIN 250 MG/5ML
500 SUSPENSION, RECONSTITUTED, ORAL (ML) ORAL EVERY 8 HOURS
Refills: 0 | Status: COMPLETED | OUTPATIENT
Start: 2022-01-01 | End: 2022-01-01

## 2022-01-01 RX ORDER — IRON SUCROSE 20 MG/ML
200 INJECTION, SOLUTION INTRAVENOUS ONCE
Refills: 0 | Status: DISCONTINUED | OUTPATIENT
Start: 2022-01-01 | End: 2022-01-01

## 2022-01-01 RX ORDER — LIDOCAINE 4 G/100G
1 CREAM TOPICAL DAILY
Refills: 0 | Status: DISCONTINUED | OUTPATIENT
Start: 2022-01-01 | End: 2022-01-01

## 2022-01-01 RX ORDER — ONDANSETRON 4 MG/1
4 TABLET ORAL
Qty: 12 | Refills: 0 | Status: COMPLETED | COMMUNITY
Start: 2022-01-01 | End: 2022-01-01

## 2022-01-01 RX ORDER — MORPHINE SULFATE 50 MG/1
2.5 CAPSULE, EXTENDED RELEASE ORAL
Refills: 0 | Status: DISCONTINUED | OUTPATIENT
Start: 2022-01-01 | End: 2022-01-01

## 2022-01-01 RX ORDER — HYDROMORPHONE HYDROCHLORIDE 2 MG/ML
0.25 INJECTION INTRAMUSCULAR; INTRAVENOUS; SUBCUTANEOUS
Refills: 0 | Status: DISCONTINUED | OUTPATIENT
Start: 2022-01-01 | End: 2022-01-01

## 2022-01-01 RX ORDER — POLYETHYLENE GLYCOL 3350 17 G/17G
17 POWDER, FOR SOLUTION ORAL
Refills: 0 | Status: DISCONTINUED | OUTPATIENT
Start: 2022-01-01 | End: 2022-01-01

## 2022-01-01 RX ORDER — MORPHINE SULFATE 50 MG/1
3 CAPSULE, EXTENDED RELEASE ORAL EVERY 4 HOURS
Refills: 0 | Status: DISCONTINUED | OUTPATIENT
Start: 2022-01-01 | End: 2022-01-01

## 2022-01-01 RX ORDER — AMPICILLIN SODIUM AND SULBACTAM SODIUM 250; 125 MG/ML; MG/ML
3 INJECTION, POWDER, FOR SUSPENSION INTRAMUSCULAR; INTRAVENOUS EVERY 6 HOURS
Refills: 0 | Status: DISCONTINUED | OUTPATIENT
Start: 2022-01-01 | End: 2022-01-01

## 2022-01-01 RX ORDER — BLOOD SUGAR DIAGNOSTIC
STRIP MISCELLANEOUS
Qty: 100 | Refills: 0 | Status: ACTIVE | COMMUNITY
Start: 2022-01-01

## 2022-01-01 RX ORDER — MORPHINE SULFATE 50 MG/1
5 CAPSULE, EXTENDED RELEASE ORAL ONCE
Refills: 0 | Status: DISCONTINUED | OUTPATIENT
Start: 2022-01-01 | End: 2022-01-01

## 2022-01-01 RX ORDER — INFLUENZA VIRUS VACCINE 15; 15; 15; 15 UG/.5ML; UG/.5ML; UG/.5ML; UG/.5ML
0.5 SUSPENSION INTRAMUSCULAR ONCE
Refills: 0 | Status: DISCONTINUED | OUTPATIENT
Start: 2022-01-01 | End: 2022-01-01

## 2022-01-01 RX ORDER — FENTANYL CITRATE 50 UG/ML
25 INJECTION INTRAVENOUS
Refills: 0 | Status: DISCONTINUED | OUTPATIENT
Start: 2022-01-01 | End: 2022-01-01

## 2022-01-01 RX ORDER — AMPICILLIN SODIUM AND SULBACTAM SODIUM 250; 125 MG/ML; MG/ML
3 INJECTION, POWDER, FOR SUSPENSION INTRAMUSCULAR; INTRAVENOUS ONCE
Refills: 0 | Status: COMPLETED | OUTPATIENT
Start: 2022-01-01 | End: 2022-01-01

## 2022-01-01 RX ORDER — AMOXICILLIN 250 MG/5ML
250 SUSPENSION, RECONSTITUTED, ORAL (ML) ORAL EVERY 8 HOURS
Refills: 0 | Status: DISCONTINUED | OUTPATIENT
Start: 2022-01-01 | End: 2022-01-01

## 2022-01-01 RX ORDER — GLUCAGON INJECTION, SOLUTION 0.5 MG/.1ML
1 INJECTION, SOLUTION SUBCUTANEOUS ONCE
Refills: 0 | Status: COMPLETED | OUTPATIENT
Start: 2022-01-01 | End: 2022-01-01

## 2022-01-01 RX ORDER — DEXTROSE 50 % IN WATER 50 %
15 SYRINGE (ML) INTRAVENOUS ONCE
Refills: 0 | Status: DISCONTINUED | OUTPATIENT
Start: 2022-01-01 | End: 2022-01-01

## 2022-01-01 RX ORDER — CEFPODOXIME PROXETIL 100 MG
1 TABLET ORAL
Qty: 14 | Refills: 0
Start: 2022-01-01 | End: 2022-01-01

## 2022-01-01 RX ORDER — TRAMADOL HYDROCHLORIDE 50 MG/1
0.5 TABLET ORAL
Qty: 10 | Refills: 0
Start: 2022-01-01 | End: 2022-01-01

## 2022-01-01 RX ORDER — TRAMADOL HYDROCHLORIDE 50 MG/1
0.5 TABLET ORAL
Qty: 0 | Refills: 0 | DISCHARGE

## 2022-01-01 RX ORDER — ONDANSETRON 8 MG/1
4 TABLET, FILM COATED ORAL ONCE
Refills: 0 | Status: COMPLETED | OUTPATIENT
Start: 2022-01-01 | End: 2022-01-01

## 2022-01-01 RX ORDER — BUDESONIDE AND FORMOTEROL FUMARATE DIHYDRATE 80; 4.5 UG/1; UG/1
80-4.5 AEROSOL RESPIRATORY (INHALATION)
Qty: 10 | Refills: 0 | Status: DISCONTINUED | COMMUNITY
Start: 2022-01-01 | End: 2022-01-01

## 2022-01-01 RX ORDER — PANTOPRAZOLE SODIUM 20 MG/1
40 TABLET, DELAYED RELEASE ORAL EVERY 12 HOURS
Refills: 0 | Status: DISCONTINUED | OUTPATIENT
Start: 2022-01-01 | End: 2022-01-01

## 2022-01-01 RX ORDER — ACETAMINOPHEN 500 MG
650 TABLET ORAL ONCE
Refills: 0 | Status: DISCONTINUED | OUTPATIENT
Start: 2022-01-01 | End: 2022-01-01

## 2022-01-01 RX ORDER — TRAMADOL HYDROCHLORIDE 50 MG/1
25 TABLET ORAL EVERY 6 HOURS
Refills: 0 | Status: DISCONTINUED | OUTPATIENT
Start: 2022-01-01 | End: 2022-01-01

## 2022-01-01 RX ORDER — ACETAMINOPHEN 500 MG
500 TABLET ORAL
Qty: 0 | Refills: 0 | DISCHARGE

## 2022-01-01 RX ORDER — OXYCODONE HYDROCHLORIDE 5 MG/1
5 TABLET ORAL ONCE
Refills: 0 | Status: DISCONTINUED | OUTPATIENT
Start: 2022-01-01 | End: 2022-01-01

## 2022-01-01 RX ORDER — SIMETHICONE 80 MG/1
80 TABLET, CHEWABLE ORAL EVERY 6 HOURS
Refills: 0 | Status: DISCONTINUED | OUTPATIENT
Start: 2022-01-01 | End: 2022-01-01

## 2022-01-01 RX ORDER — ACETAMINOPHEN 500 MG
1 TABLET ORAL
Qty: 0 | Refills: 0 | DISCHARGE
Start: 2022-01-01

## 2022-01-01 RX ORDER — MORPHINE SULFATE 50 MG/1
2 CAPSULE, EXTENDED RELEASE ORAL ONCE
Refills: 0 | Status: DISCONTINUED | OUTPATIENT
Start: 2022-01-01 | End: 2022-01-01

## 2022-01-01 RX ORDER — CEFUROXIME AXETIL 250 MG
250 TABLET ORAL ONCE
Refills: 0 | Status: COMPLETED | OUTPATIENT
Start: 2022-01-01 | End: 2022-01-01

## 2022-01-01 RX ORDER — OLANZAPINE 5 MG/1
5 TABLET, FILM COATED ORAL
Qty: 5 | Refills: 0 | Status: COMPLETED | COMMUNITY
Start: 2022-01-01 | End: 2022-01-01

## 2022-01-01 RX ORDER — KETOROLAC TROMETHAMINE 30 MG/ML
10 SYRINGE (ML) INJECTION ONCE
Refills: 0 | Status: DISCONTINUED | OUTPATIENT
Start: 2022-01-01 | End: 2022-01-01

## 2022-01-01 RX ORDER — FOLIC ACID 0.8 MG
1 TABLET ORAL
Qty: 30 | Refills: 0
Start: 2022-01-01 | End: 2022-01-01

## 2022-01-01 RX ORDER — DIPHENHYDRAMINE HCL 50 MG
25 CAPSULE ORAL ONCE
Refills: 0 | Status: DISCONTINUED | OUTPATIENT
Start: 2022-01-01 | End: 2022-01-01

## 2022-01-01 RX ORDER — FERROUS SULFATE 325(65) MG
325 TABLET ORAL DAILY
Refills: 0 | Status: DISCONTINUED | OUTPATIENT
Start: 2022-01-01 | End: 2022-01-01

## 2022-01-01 RX ORDER — LACTULOSE 10 G/15ML
15 SOLUTION ORAL EVERY OTHER DAY
Refills: 0 | Status: DISCONTINUED | OUTPATIENT
Start: 2022-01-01 | End: 2022-01-01

## 2022-01-01 RX ORDER — MORPHINE SULFATE 50 MG/1
2.5 CAPSULE, EXTENDED RELEASE ORAL
Qty: 210 | Refills: 0
Start: 2022-01-01 | End: 2022-01-01

## 2022-01-01 RX ORDER — MORPHINE SULFATE 50 MG/1
4 CAPSULE, EXTENDED RELEASE ORAL EVERY 4 HOURS
Refills: 0 | Status: DISCONTINUED | OUTPATIENT
Start: 2022-01-01 | End: 2022-01-01

## 2022-01-01 RX ORDER — CEFPODOXIME PROXETIL 200 MG/1
200 TABLET, FILM COATED ORAL
Qty: 14 | Refills: 0 | Status: COMPLETED | COMMUNITY
Start: 2022-01-01

## 2022-01-01 RX ORDER — TRAMADOL HYDROCHLORIDE 50 MG/1
1 TABLET ORAL
Qty: 20 | Refills: 0
Start: 2022-01-01 | End: 2022-01-01

## 2022-01-01 RX ORDER — ACETAMINOPHEN 325 MG/1
325 TABLET, FILM COATED ORAL
Refills: 0 | Status: ACTIVE | COMMUNITY

## 2022-01-01 RX ORDER — IRON SUCROSE 20 MG/ML
100 INJECTION, SOLUTION INTRAVENOUS EVERY 24 HOURS
Refills: 0 | Status: COMPLETED | OUTPATIENT
Start: 2022-01-01 | End: 2022-01-01

## 2022-01-01 RX ORDER — CEFPODOXIME PROXETIL 100 MG/1
100 TABLET, FILM COATED ORAL
Qty: 6 | Refills: 0 | Status: COMPLETED | COMMUNITY
Start: 2022-01-01

## 2022-01-01 RX ORDER — LINEZOLID 600 MG/300ML
600 INJECTION, SOLUTION INTRAVENOUS EVERY 12 HOURS
Refills: 0 | Status: DISCONTINUED | OUTPATIENT
Start: 2022-01-01 | End: 2022-01-01

## 2022-01-01 RX ORDER — CEFTRIAXONE 500 MG/1
1000 INJECTION, POWDER, FOR SOLUTION INTRAMUSCULAR; INTRAVENOUS EVERY 24 HOURS
Refills: 0 | Status: COMPLETED | OUTPATIENT
Start: 2022-01-01 | End: 2022-01-01

## 2022-01-01 RX ORDER — INSULIN LISPRO 100/ML
VIAL (ML) SUBCUTANEOUS
Refills: 0 | Status: DISCONTINUED | OUTPATIENT
Start: 2022-01-01 | End: 2022-01-01

## 2022-01-01 RX ORDER — DEXTROSE 50 % IN WATER 50 %
25 SYRINGE (ML) INTRAVENOUS ONCE
Refills: 0 | Status: COMPLETED | OUTPATIENT
Start: 2022-01-01 | End: 2022-01-01

## 2022-01-01 RX ORDER — CEFPODOXIME PROXETIL 100 MG
100 TABLET ORAL EVERY 12 HOURS
Refills: 0 | Status: DISCONTINUED | OUTPATIENT
Start: 2022-01-01 | End: 2022-01-01

## 2022-01-01 RX ORDER — HYDROMORPHONE HYDROCHLORIDE 2 MG/ML
0.2 INJECTION INTRAMUSCULAR; INTRAVENOUS; SUBCUTANEOUS
Refills: 0 | Status: DISCONTINUED | OUTPATIENT
Start: 2022-01-01 | End: 2022-01-01

## 2022-01-01 RX ORDER — LACTOBACILLUS ACIDOPHILUS 100MM CELL
1 CAPSULE ORAL
Refills: 0 | Status: DISCONTINUED | OUTPATIENT
Start: 2022-01-01 | End: 2022-01-01

## 2022-01-01 RX ORDER — ONDANSETRON 8 MG/1
0 TABLET, FILM COATED ORAL
Qty: 0 | Refills: 2 | DISCHARGE

## 2022-01-01 RX ORDER — CIPROFLOXACIN LACTATE 400MG/40ML
400 VIAL (ML) INTRAVENOUS ONCE
Refills: 0 | Status: COMPLETED | OUTPATIENT
Start: 2022-01-01 | End: 2022-01-01

## 2022-01-01 RX ORDER — ACETAMINOPHEN 500 MG
575 TABLET ORAL ONCE
Refills: 0 | Status: DISCONTINUED | OUTPATIENT
Start: 2022-01-01 | End: 2022-01-01

## 2022-01-01 RX ORDER — MAGNESIUM HYDROXIDE 400 MG/1
30 TABLET, CHEWABLE ORAL ONCE
Refills: 0 | Status: COMPLETED | OUTPATIENT
Start: 2022-01-01 | End: 2022-01-01

## 2022-01-01 RX ORDER — DIAZEPAM 5 MG
1 TABLET ORAL
Qty: 15 | Refills: 0
Start: 2022-01-01 | End: 2022-01-01

## 2022-01-01 RX ORDER — ACETAMINOPHEN 500 MG
2 TABLET ORAL
Qty: 0 | Refills: 0 | DISCHARGE

## 2022-01-01 RX ORDER — GLUCAGON INJECTION, SOLUTION 0.5 MG/.1ML
1 INJECTION, SOLUTION SUBCUTANEOUS ONCE
Refills: 0 | Status: DISCONTINUED | OUTPATIENT
Start: 2022-01-01 | End: 2022-01-01

## 2022-01-01 RX ADMIN — Medication 1 MILLIGRAM(S): at 12:18

## 2022-01-01 RX ADMIN — Medication 2000 UNIT(S): at 12:53

## 2022-01-01 RX ADMIN — Medication 10 MILLIGRAM(S): at 01:12

## 2022-01-01 RX ADMIN — Medication 650 MILLIGRAM(S): at 00:00

## 2022-01-01 RX ADMIN — AMPICILLIN SODIUM AND SULBACTAM SODIUM 200 GRAM(S): 250; 125 INJECTION, POWDER, FOR SUSPENSION INTRAMUSCULAR; INTRAVENOUS at 16:08

## 2022-01-01 RX ADMIN — SENNA PLUS 2 TABLET(S): 8.6 TABLET ORAL at 21:51

## 2022-01-01 RX ADMIN — MORPHINE SULFATE 5 MILLIGRAM(S): 50 CAPSULE, EXTENDED RELEASE ORAL at 09:47

## 2022-01-01 RX ADMIN — Medication 1000 UNIT(S): at 11:55

## 2022-01-01 RX ADMIN — PANTOPRAZOLE SODIUM 40 MILLIGRAM(S): 20 TABLET, DELAYED RELEASE ORAL at 22:10

## 2022-01-01 RX ADMIN — MORPHINE SULFATE 5 MILLIGRAM(S): 50 CAPSULE, EXTENDED RELEASE ORAL at 23:12

## 2022-01-01 RX ADMIN — SENNA PLUS 2 TABLET(S): 8.6 TABLET ORAL at 21:58

## 2022-01-01 RX ADMIN — Medication 650 MILLIGRAM(S): at 06:27

## 2022-01-01 RX ADMIN — Medication 650 MILLIGRAM(S): at 18:05

## 2022-01-01 RX ADMIN — TRAMADOL HYDROCHLORIDE 25 MILLIGRAM(S): 50 TABLET ORAL at 11:40

## 2022-01-01 RX ADMIN — SENNA PLUS 2 TABLET(S): 8.6 TABLET ORAL at 23:35

## 2022-01-01 RX ADMIN — Medication 650 MILLIGRAM(S): at 10:15

## 2022-01-01 RX ADMIN — Medication 325 MILLIGRAM(S): at 09:28

## 2022-01-01 RX ADMIN — MORPHINE SULFATE 5 MILLIGRAM(S): 50 CAPSULE, EXTENDED RELEASE ORAL at 10:09

## 2022-01-01 RX ADMIN — Medication 650 MILLIGRAM(S): at 17:47

## 2022-01-01 RX ADMIN — Medication 650 MILLIGRAM(S): at 05:50

## 2022-01-01 RX ADMIN — Medication 650 MILLIGRAM(S): at 19:04

## 2022-01-01 RX ADMIN — MORPHINE SULFATE 5 MILLIGRAM(S): 50 CAPSULE, EXTENDED RELEASE ORAL at 23:58

## 2022-01-01 RX ADMIN — CHLORHEXIDINE GLUCONATE 1 APPLICATION(S): 213 SOLUTION TOPICAL at 12:54

## 2022-01-01 RX ADMIN — Medication 650 MILLIGRAM(S): at 05:33

## 2022-01-01 RX ADMIN — Medication 650 MILLIGRAM(S): at 22:58

## 2022-01-01 RX ADMIN — MORPHINE SULFATE 5 MILLIGRAM(S): 50 CAPSULE, EXTENDED RELEASE ORAL at 22:07

## 2022-01-01 RX ADMIN — MORPHINE SULFATE 5 MILLIGRAM(S): 50 CAPSULE, EXTENDED RELEASE ORAL at 11:44

## 2022-01-01 RX ADMIN — Medication 325 MILLIGRAM(S): at 11:41

## 2022-01-01 RX ADMIN — Medication 650 MILLIGRAM(S): at 04:50

## 2022-01-01 RX ADMIN — TRAMADOL HYDROCHLORIDE 25 MILLIGRAM(S): 50 TABLET ORAL at 09:42

## 2022-01-01 RX ADMIN — MORPHINE SULFATE 5 MILLIGRAM(S): 50 CAPSULE, EXTENDED RELEASE ORAL at 07:24

## 2022-01-01 RX ADMIN — MORPHINE SULFATE 5 MILLIGRAM(S): 50 CAPSULE, EXTENDED RELEASE ORAL at 08:35

## 2022-01-01 RX ADMIN — MORPHINE SULFATE 5 MILLIGRAM(S): 50 CAPSULE, EXTENDED RELEASE ORAL at 08:20

## 2022-01-01 RX ADMIN — MORPHINE SULFATE 5 MILLIGRAM(S): 50 CAPSULE, EXTENDED RELEASE ORAL at 08:08

## 2022-01-01 RX ADMIN — Medication 650 MILLIGRAM(S): at 11:05

## 2022-01-01 RX ADMIN — MORPHINE SULFATE 5 MILLIGRAM(S): 50 CAPSULE, EXTENDED RELEASE ORAL at 01:16

## 2022-01-01 RX ADMIN — MORPHINE SULFATE 5 MILLIGRAM(S): 50 CAPSULE, EXTENDED RELEASE ORAL at 04:19

## 2022-01-01 RX ADMIN — Medication 1 MILLIGRAM(S): at 13:11

## 2022-01-01 RX ADMIN — Medication 650 MILLIGRAM(S): at 22:55

## 2022-01-01 RX ADMIN — SENNA PLUS 2 TABLET(S): 8.6 TABLET ORAL at 21:35

## 2022-01-01 RX ADMIN — Medication 650 MILLIGRAM(S): at 01:28

## 2022-01-01 RX ADMIN — Medication 650 MILLIGRAM(S): at 18:46

## 2022-01-01 RX ADMIN — Medication 650 MILLIGRAM(S): at 11:11

## 2022-01-01 RX ADMIN — Medication 650 MILLIGRAM(S): at 23:12

## 2022-01-01 RX ADMIN — TRAMADOL HYDROCHLORIDE 50 MILLIGRAM(S): 50 TABLET ORAL at 08:36

## 2022-01-01 RX ADMIN — MORPHINE SULFATE 5 MILLIGRAM(S): 50 CAPSULE, EXTENDED RELEASE ORAL at 03:19

## 2022-01-01 RX ADMIN — Medication 650 MILLIGRAM(S): at 07:42

## 2022-01-01 RX ADMIN — Medication 650 MILLIGRAM(S): at 01:37

## 2022-01-01 RX ADMIN — Medication 650 MILLIGRAM(S): at 05:44

## 2022-01-01 RX ADMIN — Medication 325 MILLIGRAM(S): at 15:05

## 2022-01-01 RX ADMIN — Medication 325 MILLIGRAM(S): at 19:15

## 2022-01-01 RX ADMIN — Medication 650 MILLIGRAM(S): at 17:09

## 2022-01-01 RX ADMIN — MORPHINE SULFATE 5 MILLIGRAM(S): 50 CAPSULE, EXTENDED RELEASE ORAL at 04:44

## 2022-01-01 RX ADMIN — Medication 1 MILLIGRAM(S): at 11:33

## 2022-01-01 RX ADMIN — Medication 325 MILLIGRAM(S): at 17:24

## 2022-01-01 RX ADMIN — Medication 325 MILLIGRAM(S): at 07:33

## 2022-01-01 RX ADMIN — MORPHINE SULFATE 5 MILLIGRAM(S): 50 CAPSULE, EXTENDED RELEASE ORAL at 12:52

## 2022-01-01 RX ADMIN — Medication 325 MILLIGRAM(S): at 10:10

## 2022-01-01 RX ADMIN — Medication 650 MILLIGRAM(S): at 23:17

## 2022-01-01 RX ADMIN — Medication 650 MILLIGRAM(S): at 08:09

## 2022-01-01 RX ADMIN — Medication 650 MILLIGRAM(S): at 07:40

## 2022-01-01 RX ADMIN — Medication 650 MILLIGRAM(S): at 06:42

## 2022-01-01 RX ADMIN — Medication 650 MILLIGRAM(S): at 06:03

## 2022-01-01 RX ADMIN — MORPHINE SULFATE 5 MILLIGRAM(S): 50 CAPSULE, EXTENDED RELEASE ORAL at 06:31

## 2022-01-01 RX ADMIN — MORPHINE SULFATE 5 MILLIGRAM(S): 50 CAPSULE, EXTENDED RELEASE ORAL at 02:00

## 2022-01-01 RX ADMIN — Medication 650 MILLIGRAM(S): at 03:13

## 2022-01-01 RX ADMIN — MORPHINE SULFATE 5 MILLIGRAM(S): 50 CAPSULE, EXTENDED RELEASE ORAL at 17:20

## 2022-01-01 RX ADMIN — Medication 650 MILLIGRAM(S): at 12:56

## 2022-01-01 RX ADMIN — Medication 325 MILLIGRAM(S): at 17:12

## 2022-01-01 RX ADMIN — MORPHINE SULFATE 5 MILLIGRAM(S): 50 CAPSULE, EXTENDED RELEASE ORAL at 21:56

## 2022-01-01 RX ADMIN — MORPHINE SULFATE 5 MILLIGRAM(S): 50 CAPSULE, EXTENDED RELEASE ORAL at 08:44

## 2022-01-01 RX ADMIN — Medication 325 MILLIGRAM(S): at 05:05

## 2022-01-01 RX ADMIN — SENNA PLUS 2 TABLET(S): 8.6 TABLET ORAL at 21:14

## 2022-01-01 RX ADMIN — CEFEPIME 100 MILLIGRAM(S): 1 INJECTION, POWDER, FOR SOLUTION INTRAMUSCULAR; INTRAVENOUS at 13:41

## 2022-01-01 RX ADMIN — MORPHINE SULFATE 5 MILLIGRAM(S): 50 CAPSULE, EXTENDED RELEASE ORAL at 09:04

## 2022-01-01 RX ADMIN — MORPHINE SULFATE 5 MILLIGRAM(S): 50 CAPSULE, EXTENDED RELEASE ORAL at 22:12

## 2022-01-01 RX ADMIN — MORPHINE SULFATE 7.5 MILLIGRAM(S): 50 CAPSULE, EXTENDED RELEASE ORAL at 00:07

## 2022-01-01 RX ADMIN — SODIUM CHLORIDE 41.67 MILLILITER(S): 9 INJECTION INTRAMUSCULAR; INTRAVENOUS; SUBCUTANEOUS at 16:52

## 2022-01-01 RX ADMIN — MORPHINE SULFATE 5 MILLIGRAM(S): 50 CAPSULE, EXTENDED RELEASE ORAL at 12:16

## 2022-01-01 RX ADMIN — MORPHINE SULFATE 5 MILLIGRAM(S): 50 CAPSULE, EXTENDED RELEASE ORAL at 14:02

## 2022-01-01 RX ADMIN — TRAMADOL HYDROCHLORIDE 25 MILLIGRAM(S): 50 TABLET ORAL at 02:30

## 2022-01-01 RX ADMIN — Medication 650 MILLIGRAM(S): at 17:16

## 2022-01-01 RX ADMIN — CEFTRIAXONE 100 MILLIGRAM(S): 500 INJECTION, POWDER, FOR SOLUTION INTRAMUSCULAR; INTRAVENOUS at 13:28

## 2022-01-01 RX ADMIN — MORPHINE SULFATE 5 MILLIGRAM(S): 50 CAPSULE, EXTENDED RELEASE ORAL at 13:20

## 2022-01-01 RX ADMIN — Medication 650 MILLIGRAM(S): at 00:39

## 2022-01-01 RX ADMIN — MORPHINE SULFATE 5 MILLIGRAM(S): 50 CAPSULE, EXTENDED RELEASE ORAL at 15:22

## 2022-01-01 RX ADMIN — Medication 650 MILLIGRAM(S): at 04:38

## 2022-01-01 RX ADMIN — MORPHINE SULFATE 5 MILLIGRAM(S): 50 CAPSULE, EXTENDED RELEASE ORAL at 21:58

## 2022-01-01 RX ADMIN — Medication 325 MILLIGRAM(S): at 22:45

## 2022-01-01 RX ADMIN — Medication 325 MILLIGRAM(S): at 18:00

## 2022-01-01 RX ADMIN — Medication 1 MILLIGRAM(S): at 11:36

## 2022-01-01 RX ADMIN — Medication 650 MILLIGRAM(S): at 19:54

## 2022-01-01 RX ADMIN — MORPHINE SULFATE 5 MILLIGRAM(S): 50 CAPSULE, EXTENDED RELEASE ORAL at 23:29

## 2022-01-01 RX ADMIN — Medication 325 MILLIGRAM(S): at 02:14

## 2022-01-01 RX ADMIN — Medication 650 MILLIGRAM(S): at 05:01

## 2022-01-01 RX ADMIN — MORPHINE SULFATE 5 MILLIGRAM(S): 50 CAPSULE, EXTENDED RELEASE ORAL at 18:57

## 2022-01-01 RX ADMIN — MORPHINE SULFATE 5 MILLIGRAM(S): 50 CAPSULE, EXTENDED RELEASE ORAL at 04:59

## 2022-01-01 RX ADMIN — Medication 325 MILLIGRAM(S): at 21:44

## 2022-01-01 RX ADMIN — MORPHINE SULFATE 5 MILLIGRAM(S): 50 CAPSULE, EXTENDED RELEASE ORAL at 22:57

## 2022-01-01 RX ADMIN — Medication 500 MILLIGRAM(S): at 06:10

## 2022-01-01 RX ADMIN — MORPHINE SULFATE 5 MILLIGRAM(S): 50 CAPSULE, EXTENDED RELEASE ORAL at 13:00

## 2022-01-01 RX ADMIN — Medication 25 MILLILITER(S): at 08:19

## 2022-01-01 RX ADMIN — Medication 325 MILLIGRAM(S): at 14:16

## 2022-01-01 RX ADMIN — MORPHINE SULFATE 5 MILLIGRAM(S): 50 CAPSULE, EXTENDED RELEASE ORAL at 13:11

## 2022-01-01 RX ADMIN — Medication 325 MILLIGRAM(S): at 06:09

## 2022-01-01 RX ADMIN — Medication 650 MILLIGRAM(S): at 01:49

## 2022-01-01 RX ADMIN — Medication 325 MILLIGRAM(S): at 22:29

## 2022-01-01 RX ADMIN — Medication 650 MILLIGRAM(S): at 12:40

## 2022-01-01 RX ADMIN — Medication 325 MILLIGRAM(S): at 11:30

## 2022-01-01 RX ADMIN — MORPHINE SULFATE 5 MILLIGRAM(S): 50 CAPSULE, EXTENDED RELEASE ORAL at 04:15

## 2022-01-01 RX ADMIN — Medication 250 MILLIGRAM(S): at 02:14

## 2022-01-01 RX ADMIN — MORPHINE SULFATE 5 MILLIGRAM(S): 50 CAPSULE, EXTENDED RELEASE ORAL at 16:47

## 2022-01-01 RX ADMIN — MORPHINE SULFATE 5 MILLIGRAM(S): 50 CAPSULE, EXTENDED RELEASE ORAL at 01:34

## 2022-01-01 RX ADMIN — MORPHINE SULFATE 5 MILLIGRAM(S): 50 CAPSULE, EXTENDED RELEASE ORAL at 18:54

## 2022-01-01 RX ADMIN — Medication 325 MILLIGRAM(S): at 14:50

## 2022-01-01 RX ADMIN — Medication 650 MILLIGRAM(S): at 16:17

## 2022-01-01 RX ADMIN — Medication 650 MILLIGRAM(S): at 07:08

## 2022-01-01 RX ADMIN — MORPHINE SULFATE 5 MILLIGRAM(S): 50 CAPSULE, EXTENDED RELEASE ORAL at 23:10

## 2022-01-01 RX ADMIN — Medication 650 MILLIGRAM(S): at 16:40

## 2022-01-01 RX ADMIN — MORPHINE SULFATE 2 MILLIGRAM(S): 50 CAPSULE, EXTENDED RELEASE ORAL at 03:26

## 2022-01-01 RX ADMIN — MORPHINE SULFATE 5 MILLIGRAM(S): 50 CAPSULE, EXTENDED RELEASE ORAL at 10:38

## 2022-01-01 RX ADMIN — Medication 325 MILLIGRAM(S): at 15:42

## 2022-01-01 RX ADMIN — Medication 325 MILLIGRAM(S): at 17:15

## 2022-01-01 RX ADMIN — SENNA PLUS 2 TABLET(S): 8.6 TABLET ORAL at 23:01

## 2022-01-01 RX ADMIN — Medication 500 MILLIGRAM(S): at 08:22

## 2022-01-01 RX ADMIN — HEPARIN SODIUM 5000 UNIT(S): 5000 INJECTION INTRAVENOUS; SUBCUTANEOUS at 05:18

## 2022-01-01 RX ADMIN — Medication 650 MILLIGRAM(S): at 13:03

## 2022-01-01 RX ADMIN — Medication 650 MILLIGRAM(S): at 11:45

## 2022-01-01 RX ADMIN — MORPHINE SULFATE 5 MILLIGRAM(S): 50 CAPSULE, EXTENDED RELEASE ORAL at 05:37

## 2022-01-01 RX ADMIN — CHLORHEXIDINE GLUCONATE 1 APPLICATION(S): 213 SOLUTION TOPICAL at 09:08

## 2022-01-01 RX ADMIN — SENNA PLUS 2 TABLET(S): 8.6 TABLET ORAL at 23:21

## 2022-01-01 RX ADMIN — Medication 650 MILLIGRAM(S): at 19:09

## 2022-01-01 RX ADMIN — CHLORHEXIDINE GLUCONATE 1 APPLICATION(S): 213 SOLUTION TOPICAL at 12:03

## 2022-01-01 RX ADMIN — Medication 30 MILLILITER(S): at 16:45

## 2022-01-01 RX ADMIN — Medication 325 MILLIGRAM(S): at 15:56

## 2022-01-01 RX ADMIN — MORPHINE SULFATE 5 MILLIGRAM(S): 50 CAPSULE, EXTENDED RELEASE ORAL at 18:25

## 2022-01-01 RX ADMIN — MORPHINE SULFATE 5 MILLIGRAM(S): 50 CAPSULE, EXTENDED RELEASE ORAL at 13:19

## 2022-01-01 RX ADMIN — MORPHINE SULFATE 5 MILLIGRAM(S): 50 CAPSULE, EXTENDED RELEASE ORAL at 08:24

## 2022-01-01 RX ADMIN — Medication 650 MILLIGRAM(S): at 21:42

## 2022-01-01 RX ADMIN — Medication 650 MILLIGRAM(S): at 18:31

## 2022-01-01 RX ADMIN — Medication 325 MILLIGRAM(S): at 23:40

## 2022-01-01 RX ADMIN — Medication 30 MILLILITER(S): at 08:22

## 2022-01-01 RX ADMIN — Medication 650 MILLIGRAM(S): at 16:07

## 2022-01-01 RX ADMIN — Medication 325 MILLIGRAM(S): at 12:40

## 2022-01-01 RX ADMIN — Medication 325 MILLIGRAM(S): at 08:42

## 2022-01-01 RX ADMIN — MORPHINE SULFATE 5 MILLIGRAM(S): 50 CAPSULE, EXTENDED RELEASE ORAL at 22:44

## 2022-01-01 RX ADMIN — Medication 325 MILLIGRAM(S): at 12:48

## 2022-01-01 RX ADMIN — Medication 1 MILLIGRAM(S): at 11:14

## 2022-01-01 RX ADMIN — Medication 650 MILLIGRAM(S): at 00:11

## 2022-01-01 RX ADMIN — MORPHINE SULFATE 5 MILLIGRAM(S): 50 CAPSULE, EXTENDED RELEASE ORAL at 07:23

## 2022-01-01 RX ADMIN — MORPHINE SULFATE 5 MILLIGRAM(S): 50 CAPSULE, EXTENDED RELEASE ORAL at 19:20

## 2022-01-01 RX ADMIN — Medication 325 MILLIGRAM(S): at 04:52

## 2022-01-01 RX ADMIN — MORPHINE SULFATE 5 MILLIGRAM(S): 50 CAPSULE, EXTENDED RELEASE ORAL at 06:23

## 2022-01-01 RX ADMIN — Medication 325 MILLIGRAM(S): at 10:20

## 2022-01-01 RX ADMIN — Medication 650 MILLIGRAM(S): at 00:21

## 2022-01-01 RX ADMIN — Medication 650 MILLIGRAM(S): at 16:20

## 2022-01-01 RX ADMIN — Medication 650 MILLIGRAM(S): at 00:23

## 2022-01-01 RX ADMIN — MORPHINE SULFATE 7.5 MILLIGRAM(S): 50 CAPSULE, EXTENDED RELEASE ORAL at 10:55

## 2022-01-01 RX ADMIN — MORPHINE SULFATE 5 MILLIGRAM(S): 50 CAPSULE, EXTENDED RELEASE ORAL at 14:21

## 2022-01-01 RX ADMIN — MORPHINE SULFATE 5 MILLIGRAM(S): 50 CAPSULE, EXTENDED RELEASE ORAL at 00:01

## 2022-01-01 RX ADMIN — MORPHINE SULFATE 5 MILLIGRAM(S): 50 CAPSULE, EXTENDED RELEASE ORAL at 16:33

## 2022-01-01 RX ADMIN — MORPHINE SULFATE 7.5 MILLIGRAM(S): 50 CAPSULE, EXTENDED RELEASE ORAL at 12:37

## 2022-01-01 RX ADMIN — Medication 2000 UNIT(S): at 11:38

## 2022-01-01 RX ADMIN — PANTOPRAZOLE SODIUM 40 MILLIGRAM(S): 20 TABLET, DELAYED RELEASE ORAL at 07:28

## 2022-01-01 RX ADMIN — Medication 325 MILLIGRAM(S): at 11:49

## 2022-01-01 RX ADMIN — MORPHINE SULFATE 5 MILLIGRAM(S): 50 CAPSULE, EXTENDED RELEASE ORAL at 16:10

## 2022-01-01 RX ADMIN — Medication 650 MILLIGRAM(S): at 04:44

## 2022-01-01 RX ADMIN — Medication 650 MILLIGRAM(S): at 04:36

## 2022-01-01 RX ADMIN — Medication 650 MILLIGRAM(S): at 04:55

## 2022-01-01 RX ADMIN — Medication 1 MILLIGRAM(S): at 11:24

## 2022-01-01 RX ADMIN — ENOXAPARIN SODIUM 40 MILLIGRAM(S): 100 INJECTION SUBCUTANEOUS at 05:43

## 2022-01-01 RX ADMIN — MORPHINE SULFATE 5 MILLIGRAM(S): 50 CAPSULE, EXTENDED RELEASE ORAL at 03:48

## 2022-01-01 RX ADMIN — Medication 200 MILLIGRAM(S): at 16:34

## 2022-01-01 RX ADMIN — SODIUM CHLORIDE 1000 MILLILITER(S): 9 INJECTION INTRAMUSCULAR; INTRAVENOUS; SUBCUTANEOUS at 09:31

## 2022-01-01 RX ADMIN — Medication 650 MILLIGRAM(S): at 20:20

## 2022-01-01 RX ADMIN — Medication 325 MILLIGRAM(S): at 22:25

## 2022-01-01 RX ADMIN — Medication 650 MILLIGRAM(S): at 00:01

## 2022-01-01 RX ADMIN — Medication 650 MILLIGRAM(S): at 04:25

## 2022-01-01 RX ADMIN — Medication 650 MILLIGRAM(S): at 21:37

## 2022-01-01 RX ADMIN — LINEZOLID 300 MILLIGRAM(S): 600 INJECTION, SOLUTION INTRAVENOUS at 05:51

## 2022-01-01 RX ADMIN — MORPHINE SULFATE 5 MILLIGRAM(S): 50 CAPSULE, EXTENDED RELEASE ORAL at 04:02

## 2022-01-01 RX ADMIN — Medication 650 MILLIGRAM(S): at 16:28

## 2022-01-01 RX ADMIN — Medication 650 MILLIGRAM(S): at 21:06

## 2022-01-01 RX ADMIN — Medication 650 MILLIGRAM(S): at 15:51

## 2022-01-01 RX ADMIN — MORPHINE SULFATE 5 MILLIGRAM(S): 50 CAPSULE, EXTENDED RELEASE ORAL at 02:15

## 2022-01-01 RX ADMIN — Medication 650 MILLIGRAM(S): at 14:01

## 2022-01-01 RX ADMIN — Medication 650 MILLIGRAM(S): at 19:11

## 2022-01-01 RX ADMIN — TRAMADOL HYDROCHLORIDE 50 MILLIGRAM(S): 50 TABLET ORAL at 10:02

## 2022-01-01 RX ADMIN — Medication 650 MILLIGRAM(S): at 17:59

## 2022-01-01 RX ADMIN — MORPHINE SULFATE 5 MILLIGRAM(S): 50 CAPSULE, EXTENDED RELEASE ORAL at 04:31

## 2022-01-01 RX ADMIN — Medication 325 MILLIGRAM(S): at 14:42

## 2022-01-01 RX ADMIN — Medication 650 MILLIGRAM(S): at 08:13

## 2022-01-01 RX ADMIN — MORPHINE SULFATE 5 MILLIGRAM(S): 50 CAPSULE, EXTENDED RELEASE ORAL at 22:35

## 2022-01-01 RX ADMIN — Medication 100 MILLIGRAM(S): at 23:42

## 2022-01-01 RX ADMIN — Medication 650 MILLIGRAM(S): at 12:45

## 2022-01-01 RX ADMIN — Medication 325 MILLIGRAM(S): at 02:03

## 2022-01-01 RX ADMIN — Medication 650 MILLIGRAM(S): at 05:52

## 2022-01-01 RX ADMIN — Medication 325 MILLIGRAM(S): at 14:46

## 2022-01-01 RX ADMIN — Medication 650 MILLIGRAM(S): at 22:03

## 2022-01-01 RX ADMIN — TRAMADOL HYDROCHLORIDE 25 MILLIGRAM(S): 50 TABLET ORAL at 11:19

## 2022-01-01 RX ADMIN — Medication 650 MILLIGRAM(S): at 19:47

## 2022-01-01 RX ADMIN — Medication 650 MILLIGRAM(S): at 13:35

## 2022-01-01 RX ADMIN — MORPHINE SULFATE 5 MILLIGRAM(S): 50 CAPSULE, EXTENDED RELEASE ORAL at 17:05

## 2022-01-01 RX ADMIN — MORPHINE SULFATE 5 MILLIGRAM(S): 50 CAPSULE, EXTENDED RELEASE ORAL at 15:58

## 2022-01-01 RX ADMIN — Medication 650 MILLIGRAM(S): at 19:39

## 2022-01-01 RX ADMIN — MORPHINE SULFATE 5 MILLIGRAM(S): 50 CAPSULE, EXTENDED RELEASE ORAL at 13:59

## 2022-01-01 RX ADMIN — TRAMADOL HYDROCHLORIDE 50 MILLIGRAM(S): 50 TABLET ORAL at 09:32

## 2022-01-01 RX ADMIN — Medication 650 MILLIGRAM(S): at 12:54

## 2022-01-01 RX ADMIN — MORPHINE SULFATE 5 MILLIGRAM(S): 50 CAPSULE, EXTENDED RELEASE ORAL at 22:26

## 2022-01-01 RX ADMIN — SIMETHICONE 80 MILLIGRAM(S): 80 TABLET, CHEWABLE ORAL at 20:48

## 2022-01-01 RX ADMIN — TRAMADOL HYDROCHLORIDE 50 MILLIGRAM(S): 50 TABLET ORAL at 23:11

## 2022-01-01 RX ADMIN — Medication 650 MILLIGRAM(S): at 14:07

## 2022-01-01 RX ADMIN — SODIUM CHLORIDE 500 MILLILITER(S): 9 INJECTION INTRAMUSCULAR; INTRAVENOUS; SUBCUTANEOUS at 15:22

## 2022-01-01 RX ADMIN — MORPHINE SULFATE 5 MILLIGRAM(S): 50 CAPSULE, EXTENDED RELEASE ORAL at 06:22

## 2022-01-01 RX ADMIN — Medication 650 MILLIGRAM(S): at 07:31

## 2022-01-01 RX ADMIN — MORPHINE SULFATE 5 MILLIGRAM(S): 50 CAPSULE, EXTENDED RELEASE ORAL at 15:14

## 2022-01-01 RX ADMIN — Medication 400 MILLIGRAM(S): at 15:59

## 2022-01-01 RX ADMIN — Medication 650 MILLIGRAM(S): at 13:30

## 2022-01-01 RX ADMIN — TRAMADOL HYDROCHLORIDE 25 MILLIGRAM(S): 50 TABLET ORAL at 09:30

## 2022-01-01 RX ADMIN — Medication 250 MILLIGRAM(S): at 09:26

## 2022-01-01 RX ADMIN — Medication 650 MILLIGRAM(S): at 09:07

## 2022-01-01 RX ADMIN — Medication 500 MILLIGRAM(S): at 18:23

## 2022-01-01 RX ADMIN — MORPHINE SULFATE 2 MILLIGRAM(S): 50 CAPSULE, EXTENDED RELEASE ORAL at 17:53

## 2022-01-01 RX ADMIN — MORPHINE SULFATE 5 MILLIGRAM(S): 50 CAPSULE, EXTENDED RELEASE ORAL at 12:21

## 2022-01-01 RX ADMIN — Medication 325 MILLIGRAM(S): at 18:58

## 2022-01-01 RX ADMIN — Medication 325 MILLIGRAM(S): at 05:54

## 2022-01-01 RX ADMIN — MORPHINE SULFATE 2 MILLIGRAM(S): 50 CAPSULE, EXTENDED RELEASE ORAL at 16:22

## 2022-01-01 RX ADMIN — Medication 650 MILLIGRAM(S): at 11:18

## 2022-01-01 RX ADMIN — Medication 650 MILLIGRAM(S): at 08:58

## 2022-01-01 RX ADMIN — MORPHINE SULFATE 5 MILLIGRAM(S): 50 CAPSULE, EXTENDED RELEASE ORAL at 19:10

## 2022-01-01 RX ADMIN — Medication 650 MILLIGRAM(S): at 06:17

## 2022-01-01 RX ADMIN — MORPHINE SULFATE 7.5 MILLIGRAM(S): 50 CAPSULE, EXTENDED RELEASE ORAL at 11:55

## 2022-01-01 RX ADMIN — Medication 325 MILLIGRAM(S): at 01:09

## 2022-01-01 RX ADMIN — CEFEPIME 100 MILLIGRAM(S): 1 INJECTION, POWDER, FOR SOLUTION INTRAMUSCULAR; INTRAVENOUS at 15:04

## 2022-01-01 RX ADMIN — Medication 650 MILLIGRAM(S): at 10:30

## 2022-01-01 RX ADMIN — Medication 250 MILLIGRAM(S): at 08:43

## 2022-01-01 RX ADMIN — MORPHINE SULFATE 5 MILLIGRAM(S): 50 CAPSULE, EXTENDED RELEASE ORAL at 19:26

## 2022-01-01 RX ADMIN — POLYETHYLENE GLYCOL 3350 17 GRAM(S): 17 POWDER, FOR SOLUTION ORAL at 16:34

## 2022-01-01 RX ADMIN — Medication 650 MILLIGRAM(S): at 07:00

## 2022-01-01 RX ADMIN — Medication 325 MILLIGRAM(S): at 13:30

## 2022-01-01 RX ADMIN — CHLORHEXIDINE GLUCONATE 1 APPLICATION(S): 213 SOLUTION TOPICAL at 11:24

## 2022-01-01 RX ADMIN — Medication 325 MILLIGRAM(S): at 21:00

## 2022-01-01 RX ADMIN — Medication 325 MILLIGRAM(S): at 17:14

## 2022-01-01 RX ADMIN — Medication 325 MILLIGRAM(S): at 09:20

## 2022-01-01 RX ADMIN — Medication 325 MILLIGRAM(S): at 11:56

## 2022-01-01 RX ADMIN — Medication 325 MILLIGRAM(S): at 04:47

## 2022-01-01 RX ADMIN — Medication 650 MILLIGRAM(S): at 06:50

## 2022-01-01 RX ADMIN — MORPHINE SULFATE 5 MILLIGRAM(S): 50 CAPSULE, EXTENDED RELEASE ORAL at 02:11

## 2022-01-01 RX ADMIN — SODIUM CHLORIDE 1000 MILLILITER(S): 9 INJECTION INTRAMUSCULAR; INTRAVENOUS; SUBCUTANEOUS at 15:15

## 2022-01-01 RX ADMIN — Medication 650 MILLIGRAM(S): at 22:07

## 2022-01-01 RX ADMIN — MORPHINE SULFATE 5 MILLIGRAM(S): 50 CAPSULE, EXTENDED RELEASE ORAL at 14:50

## 2022-01-01 RX ADMIN — Medication 325 MILLIGRAM(S): at 17:07

## 2022-01-01 RX ADMIN — IRON SUCROSE 210 MILLIGRAM(S): 20 INJECTION, SOLUTION INTRAVENOUS at 18:33

## 2022-01-01 RX ADMIN — SODIUM CHLORIDE 80 MILLILITER(S): 9 INJECTION INTRAMUSCULAR; INTRAVENOUS; SUBCUTANEOUS at 06:19

## 2022-01-01 RX ADMIN — Medication 325 MILLIGRAM(S): at 12:17

## 2022-01-01 RX ADMIN — MORPHINE SULFATE 5 MILLIGRAM(S): 50 CAPSULE, EXTENDED RELEASE ORAL at 10:21

## 2022-01-01 RX ADMIN — SENNA PLUS 2 TABLET(S): 8.6 TABLET ORAL at 21:34

## 2022-01-01 RX ADMIN — Medication 650 MILLIGRAM(S): at 08:38

## 2022-01-01 RX ADMIN — Medication 325 MILLIGRAM(S): at 20:34

## 2022-01-01 RX ADMIN — Medication 650 MILLIGRAM(S): at 15:05

## 2022-01-01 RX ADMIN — MORPHINE SULFATE 5 MILLIGRAM(S): 50 CAPSULE, EXTENDED RELEASE ORAL at 15:47

## 2022-01-01 RX ADMIN — LIDOCAINE 1 PATCH: 4 CREAM TOPICAL at 22:52

## 2022-01-01 RX ADMIN — MORPHINE SULFATE 5 MILLIGRAM(S): 50 CAPSULE, EXTENDED RELEASE ORAL at 21:17

## 2022-01-01 RX ADMIN — Medication 1 MILLIGRAM(S): at 11:40

## 2022-01-01 RX ADMIN — Medication 650 MILLIGRAM(S): at 03:34

## 2022-01-01 RX ADMIN — MORPHINE SULFATE 5 MILLIGRAM(S): 50 CAPSULE, EXTENDED RELEASE ORAL at 02:56

## 2022-01-01 RX ADMIN — MORPHINE SULFATE 5 MILLIGRAM(S): 50 CAPSULE, EXTENDED RELEASE ORAL at 10:53

## 2022-01-01 RX ADMIN — Medication 1 MILLIGRAM(S): at 12:30

## 2022-01-01 RX ADMIN — Medication 650 MILLIGRAM(S): at 02:28

## 2022-01-01 RX ADMIN — Medication 325 MILLIGRAM(S): at 21:27

## 2022-01-01 RX ADMIN — CEFEPIME 100 MILLIGRAM(S): 1 INJECTION, POWDER, FOR SOLUTION INTRAMUSCULAR; INTRAVENOUS at 21:22

## 2022-01-01 RX ADMIN — MORPHINE SULFATE 7.5 MILLIGRAM(S): 50 CAPSULE, EXTENDED RELEASE ORAL at 01:05

## 2022-01-01 RX ADMIN — Medication 325 MILLIGRAM(S): at 21:05

## 2022-01-01 RX ADMIN — SODIUM CHLORIDE 75 MILLILITER(S): 9 INJECTION INTRAMUSCULAR; INTRAVENOUS; SUBCUTANEOUS at 02:16

## 2022-01-01 RX ADMIN — Medication 650 MILLIGRAM(S): at 10:10

## 2022-01-01 RX ADMIN — Medication 650 MILLIGRAM(S): at 16:42

## 2022-01-01 RX ADMIN — Medication 1 TABLET(S): at 18:41

## 2022-01-01 RX ADMIN — MORPHINE SULFATE 5 MILLIGRAM(S): 50 CAPSULE, EXTENDED RELEASE ORAL at 12:40

## 2022-01-01 RX ADMIN — MORPHINE SULFATE 5 MILLIGRAM(S): 50 CAPSULE, EXTENDED RELEASE ORAL at 10:44

## 2022-01-01 RX ADMIN — Medication 325 MILLIGRAM(S): at 04:22

## 2022-01-01 RX ADMIN — Medication 650 MILLIGRAM(S): at 06:22

## 2022-01-01 RX ADMIN — MORPHINE SULFATE 5 MILLIGRAM(S): 50 CAPSULE, EXTENDED RELEASE ORAL at 23:00

## 2022-01-01 RX ADMIN — Medication 1 MILLIGRAM(S): at 12:10

## 2022-01-01 RX ADMIN — Medication 650 MILLIGRAM(S): at 17:40

## 2022-01-01 RX ADMIN — Medication 250 MILLIGRAM(S): at 17:48

## 2022-01-01 RX ADMIN — MORPHINE SULFATE 5 MILLIGRAM(S): 50 CAPSULE, EXTENDED RELEASE ORAL at 05:54

## 2022-01-01 RX ADMIN — Medication 325 MILLIGRAM(S): at 20:40

## 2022-01-01 RX ADMIN — MORPHINE SULFATE 5 MILLIGRAM(S): 50 CAPSULE, EXTENDED RELEASE ORAL at 11:54

## 2022-01-01 RX ADMIN — MORPHINE SULFATE 5 MILLIGRAM(S): 50 CAPSULE, EXTENDED RELEASE ORAL at 10:20

## 2022-01-01 RX ADMIN — Medication 650 MILLIGRAM(S): at 12:52

## 2022-01-01 RX ADMIN — MORPHINE SULFATE 2 MILLIGRAM(S): 50 CAPSULE, EXTENDED RELEASE ORAL at 18:14

## 2022-01-01 RX ADMIN — Medication 325 MILLIGRAM(S): at 12:53

## 2022-01-01 RX ADMIN — TRAMADOL HYDROCHLORIDE 25 MILLIGRAM(S): 50 TABLET ORAL at 04:37

## 2022-01-01 RX ADMIN — MORPHINE SULFATE 5 MILLIGRAM(S): 50 CAPSULE, EXTENDED RELEASE ORAL at 16:22

## 2022-01-01 RX ADMIN — MORPHINE SULFATE 5 MILLIGRAM(S): 50 CAPSULE, EXTENDED RELEASE ORAL at 16:12

## 2022-01-01 RX ADMIN — Medication 650 MILLIGRAM(S): at 12:53

## 2022-01-01 RX ADMIN — Medication 650 MILLIGRAM(S): at 13:58

## 2022-01-01 RX ADMIN — MORPHINE SULFATE 5 MILLIGRAM(S): 50 CAPSULE, EXTENDED RELEASE ORAL at 09:57

## 2022-01-01 RX ADMIN — Medication 650 MILLIGRAM(S): at 10:35

## 2022-01-01 RX ADMIN — Medication 650 MILLIGRAM(S): at 06:00

## 2022-01-01 RX ADMIN — Medication 325 MILLIGRAM(S): at 21:45

## 2022-01-01 RX ADMIN — Medication 325 MILLIGRAM(S): at 10:45

## 2022-01-01 RX ADMIN — MORPHINE SULFATE 5 MILLIGRAM(S): 50 CAPSULE, EXTENDED RELEASE ORAL at 17:39

## 2022-01-01 RX ADMIN — Medication 650 MILLIGRAM(S): at 09:11

## 2022-01-01 RX ADMIN — Medication 650 MILLIGRAM(S): at 03:27

## 2022-01-01 RX ADMIN — Medication 650 MILLIGRAM(S): at 15:55

## 2022-01-01 RX ADMIN — CEFEPIME 100 MILLIGRAM(S): 1 INJECTION, POWDER, FOR SOLUTION INTRAMUSCULAR; INTRAVENOUS at 23:10

## 2022-01-01 RX ADMIN — PANTOPRAZOLE SODIUM 40 MILLIGRAM(S): 20 TABLET, DELAYED RELEASE ORAL at 06:18

## 2022-01-01 RX ADMIN — Medication 1 MILLIGRAM(S): at 16:07

## 2022-01-01 RX ADMIN — Medication 325 MILLIGRAM(S): at 18:55

## 2022-01-01 RX ADMIN — MORPHINE SULFATE 5 MILLIGRAM(S): 50 CAPSULE, EXTENDED RELEASE ORAL at 13:01

## 2022-01-01 RX ADMIN — Medication 650 MILLIGRAM(S): at 11:50

## 2022-01-01 RX ADMIN — CHLORHEXIDINE GLUCONATE 1 APPLICATION(S): 213 SOLUTION TOPICAL at 11:39

## 2022-01-01 RX ADMIN — MORPHINE SULFATE 5 MILLIGRAM(S): 50 CAPSULE, EXTENDED RELEASE ORAL at 03:28

## 2022-01-01 RX ADMIN — Medication 325 MILLIGRAM(S): at 16:52

## 2022-01-01 RX ADMIN — Medication 5 MILLIGRAM(S): at 21:09

## 2022-01-01 RX ADMIN — Medication 25 MILLIGRAM(S): at 19:22

## 2022-01-01 RX ADMIN — MORPHINE SULFATE 7.5 MILLIGRAM(S): 50 CAPSULE, EXTENDED RELEASE ORAL at 05:28

## 2022-01-01 RX ADMIN — TRAMADOL HYDROCHLORIDE 25 MILLIGRAM(S): 50 TABLET ORAL at 01:53

## 2022-01-01 RX ADMIN — Medication 650 MILLIGRAM(S): at 12:44

## 2022-01-01 RX ADMIN — MORPHINE SULFATE 5 MILLIGRAM(S): 50 CAPSULE, EXTENDED RELEASE ORAL at 21:40

## 2022-01-01 RX ADMIN — MORPHINE SULFATE 2 MILLIGRAM(S): 50 CAPSULE, EXTENDED RELEASE ORAL at 18:08

## 2022-01-01 RX ADMIN — Medication 250 MILLIGRAM(S): at 09:32

## 2022-01-01 RX ADMIN — Medication 650 MILLIGRAM(S): at 22:42

## 2022-01-01 RX ADMIN — TRAMADOL HYDROCHLORIDE 50 MILLIGRAM(S): 50 TABLET ORAL at 08:31

## 2022-01-01 RX ADMIN — Medication 325 MILLIGRAM(S): at 20:10

## 2022-01-01 RX ADMIN — Medication 325 MILLIGRAM(S): at 21:02

## 2022-01-01 RX ADMIN — SENNA PLUS 2 TABLET(S): 8.6 TABLET ORAL at 22:39

## 2022-01-01 RX ADMIN — MORPHINE SULFATE 5 MILLIGRAM(S): 50 CAPSULE, EXTENDED RELEASE ORAL at 07:06

## 2022-01-01 RX ADMIN — Medication 650 MILLIGRAM(S): at 03:02

## 2022-01-01 RX ADMIN — Medication 650 MILLIGRAM(S): at 18:11

## 2022-01-01 RX ADMIN — Medication 650 MILLIGRAM(S): at 04:23

## 2022-01-01 RX ADMIN — Medication 1 MILLIGRAM(S): at 12:02

## 2022-01-01 RX ADMIN — Medication 5 MILLIGRAM(S): at 01:06

## 2022-01-01 RX ADMIN — Medication 650 MILLIGRAM(S): at 01:05

## 2022-01-01 RX ADMIN — CHLORHEXIDINE GLUCONATE 1 APPLICATION(S): 213 SOLUTION TOPICAL at 11:45

## 2022-01-01 RX ADMIN — Medication 650 MILLIGRAM(S): at 12:20

## 2022-01-01 RX ADMIN — Medication 650 MILLIGRAM(S): at 07:59

## 2022-01-01 RX ADMIN — SODIUM CHLORIDE 1000 MILLILITER(S): 9 INJECTION INTRAMUSCULAR; INTRAVENOUS; SUBCUTANEOUS at 05:36

## 2022-01-01 RX ADMIN — Medication 650 MILLIGRAM(S): at 12:00

## 2022-01-01 RX ADMIN — TRAMADOL HYDROCHLORIDE 25 MILLIGRAM(S): 50 TABLET ORAL at 06:55

## 2022-01-01 RX ADMIN — Medication 650 MILLIGRAM(S): at 23:47

## 2022-01-01 RX ADMIN — CEFEPIME 100 MILLIGRAM(S): 1 INJECTION, POWDER, FOR SOLUTION INTRAMUSCULAR; INTRAVENOUS at 05:24

## 2022-01-01 RX ADMIN — Medication 650 MILLIGRAM(S): at 12:28

## 2022-01-01 RX ADMIN — Medication 325 MILLIGRAM(S): at 23:15

## 2022-01-01 RX ADMIN — MORPHINE SULFATE 5 MILLIGRAM(S): 50 CAPSULE, EXTENDED RELEASE ORAL at 05:59

## 2022-01-01 RX ADMIN — MORPHINE SULFATE 5 MILLIGRAM(S): 50 CAPSULE, EXTENDED RELEASE ORAL at 01:58

## 2022-01-01 RX ADMIN — Medication 650 MILLIGRAM(S): at 20:06

## 2022-01-01 RX ADMIN — Medication 650 MILLIGRAM(S): at 23:19

## 2022-01-01 RX ADMIN — MORPHINE SULFATE 5 MILLIGRAM(S): 50 CAPSULE, EXTENDED RELEASE ORAL at 09:40

## 2022-01-01 RX ADMIN — MORPHINE SULFATE 5 MILLIGRAM(S): 50 CAPSULE, EXTENDED RELEASE ORAL at 14:05

## 2022-01-01 RX ADMIN — Medication 650 MILLIGRAM(S): at 19:24

## 2022-01-01 RX ADMIN — Medication 325 MILLIGRAM(S): at 09:03

## 2022-01-01 RX ADMIN — SENNA PLUS 2 TABLET(S): 8.6 TABLET ORAL at 21:54

## 2022-01-01 RX ADMIN — Medication 650 MILLIGRAM(S): at 01:50

## 2022-01-01 RX ADMIN — TRAMADOL HYDROCHLORIDE 50 MILLIGRAM(S): 50 TABLET ORAL at 02:11

## 2022-01-01 RX ADMIN — SIMETHICONE 80 MILLIGRAM(S): 80 TABLET, CHEWABLE ORAL at 00:01

## 2022-01-01 RX ADMIN — MORPHINE SULFATE 5 MILLIGRAM(S): 50 CAPSULE, EXTENDED RELEASE ORAL at 02:10

## 2022-01-01 RX ADMIN — MORPHINE SULFATE 5 MILLIGRAM(S): 50 CAPSULE, EXTENDED RELEASE ORAL at 01:41

## 2022-01-01 RX ADMIN — ENOXAPARIN SODIUM 40 MILLIGRAM(S): 100 INJECTION SUBCUTANEOUS at 05:49

## 2022-01-01 RX ADMIN — MORPHINE SULFATE 7.5 MILLIGRAM(S): 50 CAPSULE, EXTENDED RELEASE ORAL at 10:40

## 2022-01-01 RX ADMIN — Medication 650 MILLIGRAM(S): at 17:23

## 2022-01-01 RX ADMIN — Medication 650 MILLIGRAM(S): at 14:51

## 2022-01-01 RX ADMIN — Medication 325 MILLIGRAM(S): at 12:58

## 2022-01-01 RX ADMIN — MORPHINE SULFATE 2 MILLIGRAM(S): 50 CAPSULE, EXTENDED RELEASE ORAL at 00:02

## 2022-01-01 RX ADMIN — MORPHINE SULFATE 5 MILLIGRAM(S): 50 CAPSULE, EXTENDED RELEASE ORAL at 15:38

## 2022-01-01 RX ADMIN — MORPHINE SULFATE 5 MILLIGRAM(S): 50 CAPSULE, EXTENDED RELEASE ORAL at 01:20

## 2022-01-01 RX ADMIN — Medication 500 MILLIGRAM(S): at 06:25

## 2022-01-01 RX ADMIN — Medication 650 MILLIGRAM(S): at 17:42

## 2022-01-01 RX ADMIN — MORPHINE SULFATE 5 MILLIGRAM(S): 50 CAPSULE, EXTENDED RELEASE ORAL at 12:42

## 2022-01-01 RX ADMIN — Medication 650 MILLIGRAM(S): at 07:26

## 2022-01-01 RX ADMIN — SENNA PLUS 2 TABLET(S): 8.6 TABLET ORAL at 21:55

## 2022-01-01 RX ADMIN — MORPHINE SULFATE 5 MILLIGRAM(S): 50 CAPSULE, EXTENDED RELEASE ORAL at 05:18

## 2022-01-01 RX ADMIN — MORPHINE SULFATE 5 MILLIGRAM(S): 50 CAPSULE, EXTENDED RELEASE ORAL at 17:44

## 2022-01-01 RX ADMIN — MORPHINE SULFATE 5 MILLIGRAM(S): 50 CAPSULE, EXTENDED RELEASE ORAL at 23:18

## 2022-01-01 RX ADMIN — Medication 325 MILLIGRAM(S): at 05:09

## 2022-01-01 RX ADMIN — Medication 325 MILLIGRAM(S): at 03:37

## 2022-01-01 RX ADMIN — MORPHINE SULFATE 5 MILLIGRAM(S): 50 CAPSULE, EXTENDED RELEASE ORAL at 03:38

## 2022-01-01 RX ADMIN — Medication 650 MILLIGRAM(S): at 08:15

## 2022-01-01 RX ADMIN — MORPHINE SULFATE 5 MILLIGRAM(S): 50 CAPSULE, EXTENDED RELEASE ORAL at 06:03

## 2022-01-01 RX ADMIN — Medication 325 MILLIGRAM(S): at 12:13

## 2022-01-01 RX ADMIN — Medication 325 MILLIGRAM(S): at 14:05

## 2022-01-01 RX ADMIN — Medication 650 MILLIGRAM(S): at 02:29

## 2022-01-01 RX ADMIN — Medication 650 MILLIGRAM(S): at 02:07

## 2022-01-01 RX ADMIN — Medication 650 MILLIGRAM(S): at 04:35

## 2022-01-01 RX ADMIN — CHLORHEXIDINE GLUCONATE 1 APPLICATION(S): 213 SOLUTION TOPICAL at 12:23

## 2022-01-01 RX ADMIN — Medication 650 MILLIGRAM(S): at 20:15

## 2022-01-01 RX ADMIN — CHLORHEXIDINE GLUCONATE 1 APPLICATION(S): 213 SOLUTION TOPICAL at 12:00

## 2022-01-01 RX ADMIN — Medication 650 MILLIGRAM(S): at 11:51

## 2022-01-01 RX ADMIN — MORPHINE SULFATE 5 MILLIGRAM(S): 50 CAPSULE, EXTENDED RELEASE ORAL at 08:05

## 2022-01-01 RX ADMIN — Medication 325 MILLIGRAM(S): at 06:24

## 2022-01-01 RX ADMIN — TRAMADOL HYDROCHLORIDE 25 MILLIGRAM(S): 50 TABLET ORAL at 06:06

## 2022-01-01 RX ADMIN — SIMETHICONE 80 MILLIGRAM(S): 80 TABLET, CHEWABLE ORAL at 11:07

## 2022-01-01 RX ADMIN — MORPHINE SULFATE 5 MILLIGRAM(S): 50 CAPSULE, EXTENDED RELEASE ORAL at 15:10

## 2022-01-01 RX ADMIN — MORPHINE SULFATE 5 MILLIGRAM(S): 50 CAPSULE, EXTENDED RELEASE ORAL at 14:29

## 2022-01-01 RX ADMIN — Medication 325 MILLIGRAM(S): at 00:40

## 2022-01-01 RX ADMIN — SENNA PLUS 2 TABLET(S): 8.6 TABLET ORAL at 21:42

## 2022-01-01 RX ADMIN — Medication 325 MILLIGRAM(S): at 21:55

## 2022-01-01 RX ADMIN — MORPHINE SULFATE 5 MILLIGRAM(S): 50 CAPSULE, EXTENDED RELEASE ORAL at 09:08

## 2022-01-01 RX ADMIN — Medication 325 MILLIGRAM(S): at 14:09

## 2022-01-01 RX ADMIN — AMPICILLIN SODIUM AND SULBACTAM SODIUM 200 GRAM(S): 250; 125 INJECTION, POWDER, FOR SUSPENSION INTRAMUSCULAR; INTRAVENOUS at 05:09

## 2022-01-01 RX ADMIN — MORPHINE SULFATE 5 MILLIGRAM(S): 50 CAPSULE, EXTENDED RELEASE ORAL at 05:38

## 2022-01-01 RX ADMIN — Medication 650 MILLIGRAM(S): at 22:40

## 2022-01-01 RX ADMIN — CHLORHEXIDINE GLUCONATE 1 APPLICATION(S): 213 SOLUTION TOPICAL at 16:14

## 2022-01-01 RX ADMIN — Medication 650 MILLIGRAM(S): at 01:04

## 2022-01-01 RX ADMIN — Medication 650 MILLIGRAM(S): at 21:39

## 2022-01-01 RX ADMIN — PANTOPRAZOLE SODIUM 40 MILLIGRAM(S): 20 TABLET, DELAYED RELEASE ORAL at 17:20

## 2022-01-01 RX ADMIN — Medication 325 MILLIGRAM(S): at 22:34

## 2022-01-01 RX ADMIN — Medication 650 MILLIGRAM(S): at 11:01

## 2022-01-01 RX ADMIN — Medication 325 MILLIGRAM(S): at 17:54

## 2022-01-01 RX ADMIN — Medication 650 MILLIGRAM(S): at 15:07

## 2022-01-01 RX ADMIN — Medication 325 MILLIGRAM(S): at 09:10

## 2022-01-01 RX ADMIN — Medication 325 MILLIGRAM(S): at 20:56

## 2022-01-01 RX ADMIN — Medication 650 MILLIGRAM(S): at 23:30

## 2022-01-01 RX ADMIN — Medication 650 MILLIGRAM(S): at 11:15

## 2022-01-01 RX ADMIN — Medication 325 MILLIGRAM(S): at 03:20

## 2022-01-01 RX ADMIN — Medication 650 MILLIGRAM(S): at 08:30

## 2022-01-01 RX ADMIN — Medication 650 MILLIGRAM(S): at 21:03

## 2022-01-01 RX ADMIN — Medication 650 MILLIGRAM(S): at 12:11

## 2022-01-01 RX ADMIN — Medication 2000 UNIT(S): at 11:58

## 2022-01-01 RX ADMIN — Medication 650 MILLIGRAM(S): at 15:34

## 2022-01-01 RX ADMIN — Medication 650 MILLIGRAM(S): at 08:45

## 2022-01-01 RX ADMIN — Medication 650 MILLIGRAM(S): at 07:46

## 2022-01-01 RX ADMIN — Medication 650 MILLIGRAM(S): at 09:53

## 2022-01-01 RX ADMIN — Medication 500 MILLIGRAM(S): at 11:57

## 2022-01-01 RX ADMIN — MORPHINE SULFATE 5 MILLIGRAM(S): 50 CAPSULE, EXTENDED RELEASE ORAL at 20:22

## 2022-01-01 RX ADMIN — TRAMADOL HYDROCHLORIDE 25 MILLIGRAM(S): 50 TABLET ORAL at 15:42

## 2022-01-01 RX ADMIN — MORPHINE SULFATE 5 MILLIGRAM(S): 50 CAPSULE, EXTENDED RELEASE ORAL at 02:36

## 2022-01-01 RX ADMIN — TRAMADOL HYDROCHLORIDE 25 MILLIGRAM(S): 50 TABLET ORAL at 11:49

## 2022-01-01 RX ADMIN — Medication 650 MILLIGRAM(S): at 01:48

## 2022-01-01 RX ADMIN — CEFEPIME 100 MILLIGRAM(S): 1 INJECTION, POWDER, FOR SOLUTION INTRAMUSCULAR; INTRAVENOUS at 15:27

## 2022-01-01 RX ADMIN — Medication 650 MILLIGRAM(S): at 02:42

## 2022-01-01 RX ADMIN — MORPHINE SULFATE 5 MILLIGRAM(S): 50 CAPSULE, EXTENDED RELEASE ORAL at 00:55

## 2022-01-01 RX ADMIN — MORPHINE SULFATE 5 MILLIGRAM(S): 50 CAPSULE, EXTENDED RELEASE ORAL at 00:07

## 2022-01-01 RX ADMIN — TRAMADOL HYDROCHLORIDE 25 MILLIGRAM(S): 50 TABLET ORAL at 08:33

## 2022-01-01 RX ADMIN — Medication 650 MILLIGRAM(S): at 14:58

## 2022-01-01 RX ADMIN — Medication 325 MILLIGRAM(S): at 12:06

## 2022-01-01 RX ADMIN — CEFTRIAXONE 100 MILLIGRAM(S): 500 INJECTION, POWDER, FOR SOLUTION INTRAMUSCULAR; INTRAVENOUS at 00:41

## 2022-01-01 RX ADMIN — Medication 500 MILLIGRAM(S): at 11:45

## 2022-01-01 RX ADMIN — MORPHINE SULFATE 5 MILLIGRAM(S): 50 CAPSULE, EXTENDED RELEASE ORAL at 03:14

## 2022-01-01 RX ADMIN — Medication 1 MILLIGRAM(S): at 12:27

## 2022-01-01 RX ADMIN — MORPHINE SULFATE 5 MILLIGRAM(S): 50 CAPSULE, EXTENDED RELEASE ORAL at 10:54

## 2022-01-01 RX ADMIN — SENNA PLUS 2 TABLET(S): 8.6 TABLET ORAL at 23:23

## 2022-01-01 RX ADMIN — Medication 650 MILLIGRAM(S): at 21:29

## 2022-01-01 RX ADMIN — AMPICILLIN SODIUM AND SULBACTAM SODIUM 200 GRAM(S): 250; 125 INJECTION, POWDER, FOR SUSPENSION INTRAMUSCULAR; INTRAVENOUS at 23:51

## 2022-01-01 RX ADMIN — Medication 650 MILLIGRAM(S): at 22:09

## 2022-01-01 RX ADMIN — Medication 650 MILLIGRAM(S): at 06:45

## 2022-01-01 RX ADMIN — CEFEPIME 100 MILLIGRAM(S): 1 INJECTION, POWDER, FOR SOLUTION INTRAMUSCULAR; INTRAVENOUS at 15:10

## 2022-01-01 RX ADMIN — MORPHINE SULFATE 5 MILLIGRAM(S): 50 CAPSULE, EXTENDED RELEASE ORAL at 11:02

## 2022-01-01 RX ADMIN — Medication 650 MILLIGRAM(S): at 06:40

## 2022-01-01 RX ADMIN — MORPHINE SULFATE 5 MILLIGRAM(S): 50 CAPSULE, EXTENDED RELEASE ORAL at 12:35

## 2022-01-01 RX ADMIN — MORPHINE SULFATE 5 MILLIGRAM(S): 50 CAPSULE, EXTENDED RELEASE ORAL at 17:21

## 2022-01-01 RX ADMIN — Medication 650 MILLIGRAM(S): at 07:09

## 2022-01-01 RX ADMIN — Medication 650 MILLIGRAM(S): at 10:00

## 2022-01-01 RX ADMIN — MORPHINE SULFATE 5 MILLIGRAM(S): 50 CAPSULE, EXTENDED RELEASE ORAL at 20:02

## 2022-01-01 RX ADMIN — Medication 325 MILLIGRAM(S): at 11:46

## 2022-01-01 RX ADMIN — AMPICILLIN SODIUM AND SULBACTAM SODIUM 200 GRAM(S): 250; 125 INJECTION, POWDER, FOR SUSPENSION INTRAMUSCULAR; INTRAVENOUS at 22:07

## 2022-01-01 RX ADMIN — Medication 325 MILLIGRAM(S): at 14:25

## 2022-01-01 RX ADMIN — Medication 650 MILLIGRAM(S): at 00:22

## 2022-01-01 RX ADMIN — Medication 650 MILLIGRAM(S): at 11:12

## 2022-01-01 RX ADMIN — PANTOPRAZOLE SODIUM 40 MILLIGRAM(S): 20 TABLET, DELAYED RELEASE ORAL at 18:18

## 2022-01-01 RX ADMIN — Medication 650 MILLIGRAM(S): at 03:52

## 2022-01-01 RX ADMIN — MORPHINE SULFATE 7.5 MILLIGRAM(S): 50 CAPSULE, EXTENDED RELEASE ORAL at 12:56

## 2022-01-01 RX ADMIN — MORPHINE SULFATE 5 MILLIGRAM(S): 50 CAPSULE, EXTENDED RELEASE ORAL at 17:35

## 2022-01-01 RX ADMIN — CHLORHEXIDINE GLUCONATE 1 APPLICATION(S): 213 SOLUTION TOPICAL at 11:17

## 2022-01-01 RX ADMIN — MORPHINE SULFATE 5 MILLIGRAM(S): 50 CAPSULE, EXTENDED RELEASE ORAL at 23:32

## 2022-01-01 RX ADMIN — MORPHINE SULFATE 5 MILLIGRAM(S): 50 CAPSULE, EXTENDED RELEASE ORAL at 16:31

## 2022-01-01 RX ADMIN — MORPHINE SULFATE 5 MILLIGRAM(S): 50 CAPSULE, EXTENDED RELEASE ORAL at 13:22

## 2022-01-01 RX ADMIN — Medication 650 MILLIGRAM(S): at 05:17

## 2022-01-01 RX ADMIN — Medication 325 MILLIGRAM(S): at 08:24

## 2022-01-01 RX ADMIN — MORPHINE SULFATE 5 MILLIGRAM(S): 50 CAPSULE, EXTENDED RELEASE ORAL at 03:45

## 2022-01-01 RX ADMIN — Medication 325 MILLIGRAM(S): at 05:25

## 2022-01-01 RX ADMIN — Medication 325 MILLIGRAM(S): at 21:42

## 2022-01-01 RX ADMIN — MORPHINE SULFATE 5 MILLIGRAM(S): 50 CAPSULE, EXTENDED RELEASE ORAL at 01:30

## 2022-01-01 RX ADMIN — Medication 650 MILLIGRAM(S): at 11:25

## 2022-01-01 RX ADMIN — Medication 650 MILLIGRAM(S): at 04:20

## 2022-01-01 RX ADMIN — Medication 325 MILLIGRAM(S): at 19:22

## 2022-01-01 RX ADMIN — MORPHINE SULFATE 7.5 MILLIGRAM(S): 50 CAPSULE, EXTENDED RELEASE ORAL at 16:50

## 2022-01-01 RX ADMIN — Medication 325 MILLIGRAM(S): at 05:53

## 2022-01-01 RX ADMIN — Medication 650 MILLIGRAM(S): at 04:58

## 2022-01-01 RX ADMIN — MORPHINE SULFATE 5 MILLIGRAM(S): 50 CAPSULE, EXTENDED RELEASE ORAL at 22:58

## 2022-01-01 RX ADMIN — CEFEPIME 100 MILLIGRAM(S): 1 INJECTION, POWDER, FOR SOLUTION INTRAMUSCULAR; INTRAVENOUS at 23:42

## 2022-01-01 RX ADMIN — TRAMADOL HYDROCHLORIDE 25 MILLIGRAM(S): 50 TABLET ORAL at 11:50

## 2022-01-01 RX ADMIN — Medication 650 MILLIGRAM(S): at 18:56

## 2022-01-01 RX ADMIN — Medication 325 MILLIGRAM(S): at 23:20

## 2022-01-01 RX ADMIN — MORPHINE SULFATE 5 MILLIGRAM(S): 50 CAPSULE, EXTENDED RELEASE ORAL at 16:58

## 2022-01-01 RX ADMIN — Medication 1 MILLIGRAM(S): at 11:44

## 2022-01-01 RX ADMIN — Medication 650 MILLIGRAM(S): at 07:27

## 2022-01-01 RX ADMIN — MORPHINE SULFATE 5 MILLIGRAM(S): 50 CAPSULE, EXTENDED RELEASE ORAL at 20:30

## 2022-01-01 RX ADMIN — Medication 650 MILLIGRAM(S): at 05:40

## 2022-01-01 RX ADMIN — SODIUM CHLORIDE 80 MILLILITER(S): 9 INJECTION INTRAMUSCULAR; INTRAVENOUS; SUBCUTANEOUS at 00:09

## 2022-01-01 RX ADMIN — MORPHINE SULFATE 5 MILLIGRAM(S): 50 CAPSULE, EXTENDED RELEASE ORAL at 22:21

## 2022-01-01 RX ADMIN — CEFEPIME 100 MILLIGRAM(S): 1 INJECTION, POWDER, FOR SOLUTION INTRAMUSCULAR; INTRAVENOUS at 06:06

## 2022-01-01 RX ADMIN — Medication 325 MILLIGRAM(S): at 07:41

## 2022-01-01 RX ADMIN — Medication 650 MILLIGRAM(S): at 19:58

## 2022-01-01 RX ADMIN — MORPHINE SULFATE 5 MILLIGRAM(S): 50 CAPSULE, EXTENDED RELEASE ORAL at 05:24

## 2022-01-01 RX ADMIN — Medication 650 MILLIGRAM(S): at 01:44

## 2022-01-01 RX ADMIN — Medication 650 MILLIGRAM(S): at 00:09

## 2022-01-01 RX ADMIN — Medication 1 TABLET(S): at 12:27

## 2022-01-01 RX ADMIN — MORPHINE SULFATE 5 MILLIGRAM(S): 50 CAPSULE, EXTENDED RELEASE ORAL at 14:52

## 2022-01-01 RX ADMIN — CEFEPIME 100 MILLIGRAM(S): 1 INJECTION, POWDER, FOR SOLUTION INTRAMUSCULAR; INTRAVENOUS at 05:20

## 2022-01-01 RX ADMIN — CEFEPIME 100 MILLIGRAM(S): 1 INJECTION, POWDER, FOR SOLUTION INTRAMUSCULAR; INTRAVENOUS at 06:34

## 2022-01-01 RX ADMIN — MORPHINE SULFATE 5 MILLIGRAM(S): 50 CAPSULE, EXTENDED RELEASE ORAL at 22:45

## 2022-01-01 RX ADMIN — Medication 325 MILLIGRAM(S): at 16:03

## 2022-01-01 RX ADMIN — ENOXAPARIN SODIUM 40 MILLIGRAM(S): 100 INJECTION SUBCUTANEOUS at 05:28

## 2022-01-01 RX ADMIN — Medication 325 MILLIGRAM(S): at 03:40

## 2022-01-01 RX ADMIN — Medication 325 MILLIGRAM(S): at 10:56

## 2022-01-01 RX ADMIN — MORPHINE SULFATE 7.5 MILLIGRAM(S): 50 CAPSULE, EXTENDED RELEASE ORAL at 22:33

## 2022-01-01 RX ADMIN — Medication 650 MILLIGRAM(S): at 06:24

## 2022-01-01 RX ADMIN — Medication 650 MILLIGRAM(S): at 05:10

## 2022-01-01 RX ADMIN — Medication 10 MILLIGRAM(S): at 19:21

## 2022-01-01 RX ADMIN — Medication 650 MILLIGRAM(S): at 10:08

## 2022-01-01 RX ADMIN — Medication 325 MILLIGRAM(S): at 11:47

## 2022-01-01 RX ADMIN — Medication 650 MILLIGRAM(S): at 10:58

## 2022-01-01 RX ADMIN — Medication 650 MILLIGRAM(S): at 13:41

## 2022-01-01 RX ADMIN — MORPHINE SULFATE 5 MILLIGRAM(S): 50 CAPSULE, EXTENDED RELEASE ORAL at 09:29

## 2022-01-01 RX ADMIN — MORPHINE SULFATE 5 MILLIGRAM(S): 50 CAPSULE, EXTENDED RELEASE ORAL at 18:13

## 2022-01-01 RX ADMIN — MORPHINE SULFATE 7.5 MILLIGRAM(S): 50 CAPSULE, EXTENDED RELEASE ORAL at 11:38

## 2022-01-01 RX ADMIN — CEFTRIAXONE 100 MILLIGRAM(S): 500 INJECTION, POWDER, FOR SOLUTION INTRAMUSCULAR; INTRAVENOUS at 21:19

## 2022-01-01 RX ADMIN — MORPHINE SULFATE 5 MILLIGRAM(S): 50 CAPSULE, EXTENDED RELEASE ORAL at 16:35

## 2022-01-01 RX ADMIN — Medication 250 MILLIGRAM(S): at 17:29

## 2022-01-01 RX ADMIN — MORPHINE SULFATE 5 MILLIGRAM(S): 50 CAPSULE, EXTENDED RELEASE ORAL at 23:33

## 2022-01-01 RX ADMIN — MORPHINE SULFATE 5 MILLIGRAM(S): 50 CAPSULE, EXTENDED RELEASE ORAL at 05:40

## 2022-01-01 RX ADMIN — Medication 1 MILLIGRAM(S): at 13:01

## 2022-01-01 RX ADMIN — Medication 650 MILLIGRAM(S): at 12:26

## 2022-01-01 RX ADMIN — Medication 650 MILLIGRAM(S): at 02:31

## 2022-01-01 RX ADMIN — MORPHINE SULFATE 5 MILLIGRAM(S): 50 CAPSULE, EXTENDED RELEASE ORAL at 23:02

## 2022-01-01 RX ADMIN — MORPHINE SULFATE 5 MILLIGRAM(S): 50 CAPSULE, EXTENDED RELEASE ORAL at 18:26

## 2022-01-01 RX ADMIN — Medication 325 MILLIGRAM(S): at 10:58

## 2022-01-01 RX ADMIN — MORPHINE SULFATE 5 MILLIGRAM(S): 50 CAPSULE, EXTENDED RELEASE ORAL at 01:36

## 2022-01-01 RX ADMIN — Medication 1 MILLIGRAM(S): at 12:56

## 2022-01-01 RX ADMIN — MORPHINE SULFATE 5 MILLIGRAM(S): 50 CAPSULE, EXTENDED RELEASE ORAL at 19:08

## 2022-01-01 RX ADMIN — MORPHINE SULFATE 5 MILLIGRAM(S): 50 CAPSULE, EXTENDED RELEASE ORAL at 06:40

## 2022-01-01 RX ADMIN — MORPHINE SULFATE 5 MILLIGRAM(S): 50 CAPSULE, EXTENDED RELEASE ORAL at 09:31

## 2022-01-01 RX ADMIN — MORPHINE SULFATE 5 MILLIGRAM(S): 50 CAPSULE, EXTENDED RELEASE ORAL at 02:29

## 2022-01-01 RX ADMIN — PANTOPRAZOLE SODIUM 40 MILLIGRAM(S): 20 TABLET, DELAYED RELEASE ORAL at 06:56

## 2022-01-01 RX ADMIN — TRAMADOL HYDROCHLORIDE 50 MILLIGRAM(S): 50 TABLET ORAL at 02:41

## 2022-01-01 RX ADMIN — LINEZOLID 300 MILLIGRAM(S): 600 INJECTION, SOLUTION INTRAVENOUS at 17:42

## 2022-01-01 RX ADMIN — Medication 650 MILLIGRAM(S): at 09:55

## 2022-01-01 RX ADMIN — Medication 650 MILLIGRAM(S): at 08:00

## 2022-01-01 RX ADMIN — Medication 650 MILLIGRAM(S): at 13:12

## 2022-01-01 RX ADMIN — MORPHINE SULFATE 7.5 MILLIGRAM(S): 50 CAPSULE, EXTENDED RELEASE ORAL at 14:46

## 2022-01-01 RX ADMIN — MORPHINE SULFATE 5 MILLIGRAM(S): 50 CAPSULE, EXTENDED RELEASE ORAL at 00:25

## 2022-01-01 RX ADMIN — Medication 650 MILLIGRAM(S): at 13:53

## 2022-01-01 RX ADMIN — MORPHINE SULFATE 5 MILLIGRAM(S): 50 CAPSULE, EXTENDED RELEASE ORAL at 17:01

## 2022-01-01 RX ADMIN — Medication 325 MILLIGRAM(S): at 22:56

## 2022-01-01 RX ADMIN — AMPICILLIN SODIUM AND SULBACTAM SODIUM 200 GRAM(S): 250; 125 INJECTION, POWDER, FOR SUSPENSION INTRAMUSCULAR; INTRAVENOUS at 18:02

## 2022-01-01 RX ADMIN — MORPHINE SULFATE 7.5 MILLIGRAM(S): 50 CAPSULE, EXTENDED RELEASE ORAL at 19:29

## 2022-01-01 RX ADMIN — MORPHINE SULFATE 5 MILLIGRAM(S): 50 CAPSULE, EXTENDED RELEASE ORAL at 03:47

## 2022-01-01 RX ADMIN — Medication 1 MILLIGRAM(S): at 11:45

## 2022-01-01 RX ADMIN — Medication 650 MILLIGRAM(S): at 07:39

## 2022-01-01 RX ADMIN — Medication 5 MILLIGRAM(S): at 09:32

## 2022-01-01 RX ADMIN — Medication 325 MILLIGRAM(S): at 09:52

## 2022-01-01 RX ADMIN — MORPHINE SULFATE 5 MILLIGRAM(S): 50 CAPSULE, EXTENDED RELEASE ORAL at 06:14

## 2022-01-01 RX ADMIN — Medication 650 MILLIGRAM(S): at 01:07

## 2022-01-01 RX ADMIN — MORPHINE SULFATE 1 MILLIGRAM(S): 50 CAPSULE, EXTENDED RELEASE ORAL at 04:08

## 2022-01-01 RX ADMIN — Medication 650 MILLIGRAM(S): at 16:25

## 2022-01-01 RX ADMIN — Medication 325 MILLIGRAM(S): at 21:29

## 2022-01-01 RX ADMIN — Medication 650 MILLIGRAM(S): at 14:16

## 2022-01-01 RX ADMIN — POLYETHYLENE GLYCOL 3350 17 GRAM(S): 17 POWDER, FOR SOLUTION ORAL at 13:02

## 2022-01-01 RX ADMIN — SODIUM CHLORIDE 75 MILLILITER(S): 9 INJECTION, SOLUTION INTRAVENOUS at 03:45

## 2022-01-01 RX ADMIN — Medication 325 MILLIGRAM(S): at 11:00

## 2022-01-01 RX ADMIN — CHLORHEXIDINE GLUCONATE 1 APPLICATION(S): 213 SOLUTION TOPICAL at 10:29

## 2022-01-01 RX ADMIN — Medication 325 MILLIGRAM(S): at 15:00

## 2022-01-01 RX ADMIN — Medication 100 MILLIGRAM(S): at 15:57

## 2022-01-01 RX ADMIN — Medication 650 MILLIGRAM(S): at 19:01

## 2022-01-01 RX ADMIN — MORPHINE SULFATE 5 MILLIGRAM(S): 50 CAPSULE, EXTENDED RELEASE ORAL at 17:12

## 2022-01-01 RX ADMIN — MORPHINE SULFATE 5 MILLIGRAM(S): 50 CAPSULE, EXTENDED RELEASE ORAL at 06:33

## 2022-01-01 RX ADMIN — MORPHINE SULFATE 5 MILLIGRAM(S): 50 CAPSULE, EXTENDED RELEASE ORAL at 17:23

## 2022-01-01 RX ADMIN — Medication 325 MILLIGRAM(S): at 19:37

## 2022-01-01 RX ADMIN — MORPHINE SULFATE 7.5 MILLIGRAM(S): 50 CAPSULE, EXTENDED RELEASE ORAL at 12:06

## 2022-01-01 RX ADMIN — PANTOPRAZOLE SODIUM 40 MILLIGRAM(S): 20 TABLET, DELAYED RELEASE ORAL at 21:28

## 2022-01-01 RX ADMIN — Medication 650 MILLIGRAM(S): at 20:56

## 2022-01-01 RX ADMIN — Medication 325 MILLIGRAM(S): at 16:46

## 2022-01-01 RX ADMIN — Medication 650 MILLIGRAM(S): at 01:39

## 2022-01-01 RX ADMIN — MORPHINE SULFATE 5 MILLIGRAM(S): 50 CAPSULE, EXTENDED RELEASE ORAL at 07:19

## 2022-01-01 RX ADMIN — MORPHINE SULFATE 5 MILLIGRAM(S): 50 CAPSULE, EXTENDED RELEASE ORAL at 17:10

## 2022-01-01 RX ADMIN — Medication 1 MILLIGRAM(S): at 11:38

## 2022-01-01 RX ADMIN — Medication 325 MILLIGRAM(S): at 17:56

## 2022-01-01 RX ADMIN — SIMETHICONE 80 MILLIGRAM(S): 80 TABLET, CHEWABLE ORAL at 19:41

## 2022-01-01 RX ADMIN — MORPHINE SULFATE 5 MILLIGRAM(S): 50 CAPSULE, EXTENDED RELEASE ORAL at 08:21

## 2022-01-01 RX ADMIN — Medication 650 MILLIGRAM(S): at 04:15

## 2022-01-01 RX ADMIN — CEFEPIME 100 MILLIGRAM(S): 1 INJECTION, POWDER, FOR SOLUTION INTRAMUSCULAR; INTRAVENOUS at 21:54

## 2022-01-01 RX ADMIN — MORPHINE SULFATE 7.5 MILLIGRAM(S): 50 CAPSULE, EXTENDED RELEASE ORAL at 04:28

## 2022-01-01 RX ADMIN — MORPHINE SULFATE 5 MILLIGRAM(S): 50 CAPSULE, EXTENDED RELEASE ORAL at 03:25

## 2022-01-01 RX ADMIN — MORPHINE SULFATE 5 MILLIGRAM(S): 50 CAPSULE, EXTENDED RELEASE ORAL at 19:23

## 2022-01-01 RX ADMIN — MORPHINE SULFATE 5 MILLIGRAM(S): 50 CAPSULE, EXTENDED RELEASE ORAL at 04:32

## 2022-01-01 RX ADMIN — CHLORHEXIDINE GLUCONATE 1 APPLICATION(S): 213 SOLUTION TOPICAL at 11:09

## 2022-01-01 RX ADMIN — MORPHINE SULFATE 1 MILLIGRAM(S): 50 CAPSULE, EXTENDED RELEASE ORAL at 12:20

## 2022-01-01 RX ADMIN — Medication 1 MILLIGRAM(S): at 11:43

## 2022-01-01 RX ADMIN — MORPHINE SULFATE 5 MILLIGRAM(S): 50 CAPSULE, EXTENDED RELEASE ORAL at 02:57

## 2022-01-01 RX ADMIN — Medication 650 MILLIGRAM(S): at 13:27

## 2022-01-01 RX ADMIN — Medication 1 MILLIGRAM(S): at 12:12

## 2022-01-01 RX ADMIN — Medication 650 MILLIGRAM(S): at 14:54

## 2022-01-01 RX ADMIN — Medication 650 MILLIGRAM(S): at 21:28

## 2022-01-01 RX ADMIN — MORPHINE SULFATE 5 MILLIGRAM(S): 50 CAPSULE, EXTENDED RELEASE ORAL at 16:18

## 2022-01-01 RX ADMIN — CEFEPIME 100 MILLIGRAM(S): 1 INJECTION, POWDER, FOR SOLUTION INTRAMUSCULAR; INTRAVENOUS at 13:54

## 2022-01-01 RX ADMIN — Medication 1000 UNIT(S): at 11:33

## 2022-01-01 RX ADMIN — MORPHINE SULFATE 5 MILLIGRAM(S): 50 CAPSULE, EXTENDED RELEASE ORAL at 16:00

## 2022-01-01 RX ADMIN — Medication 650 MILLIGRAM(S): at 15:53

## 2022-01-01 RX ADMIN — LINEZOLID 300 MILLIGRAM(S): 600 INJECTION, SOLUTION INTRAVENOUS at 19:07

## 2022-01-01 RX ADMIN — Medication 325 MILLIGRAM(S): at 04:30

## 2022-01-01 RX ADMIN — Medication 325 MILLIGRAM(S): at 09:00

## 2022-01-01 RX ADMIN — MORPHINE SULFATE 5 MILLIGRAM(S): 50 CAPSULE, EXTENDED RELEASE ORAL at 18:32

## 2022-01-01 RX ADMIN — CHLORHEXIDINE GLUCONATE 1 APPLICATION(S): 213 SOLUTION TOPICAL at 11:37

## 2022-01-01 RX ADMIN — CHLORHEXIDINE GLUCONATE 1 APPLICATION(S): 213 SOLUTION TOPICAL at 11:44

## 2022-01-01 RX ADMIN — Medication 325 MILLIGRAM(S): at 07:44

## 2022-01-01 RX ADMIN — MORPHINE SULFATE 5 MILLIGRAM(S): 50 CAPSULE, EXTENDED RELEASE ORAL at 22:02

## 2022-01-01 RX ADMIN — MORPHINE SULFATE 7.5 MILLIGRAM(S): 50 CAPSULE, EXTENDED RELEASE ORAL at 15:35

## 2022-01-01 RX ADMIN — Medication 325 MILLIGRAM(S): at 22:30

## 2022-01-01 RX ADMIN — CEFEPIME 100 MILLIGRAM(S): 1 INJECTION, POWDER, FOR SOLUTION INTRAMUSCULAR; INTRAVENOUS at 16:33

## 2022-01-01 RX ADMIN — CEFEPIME 100 MILLIGRAM(S): 1 INJECTION, POWDER, FOR SOLUTION INTRAMUSCULAR; INTRAVENOUS at 04:37

## 2022-01-01 RX ADMIN — Medication 650 MILLIGRAM(S): at 18:43

## 2022-01-01 RX ADMIN — CEFEPIME 100 MILLIGRAM(S): 1 INJECTION, POWDER, FOR SOLUTION INTRAMUSCULAR; INTRAVENOUS at 06:49

## 2022-01-01 RX ADMIN — CEFEPIME 100 MILLIGRAM(S): 1 INJECTION, POWDER, FOR SOLUTION INTRAMUSCULAR; INTRAVENOUS at 05:07

## 2022-01-01 RX ADMIN — MORPHINE SULFATE 5 MILLIGRAM(S): 50 CAPSULE, EXTENDED RELEASE ORAL at 21:44

## 2022-01-01 RX ADMIN — Medication 650 MILLIGRAM(S): at 14:06

## 2022-01-01 RX ADMIN — MORPHINE SULFATE 2 MILLIGRAM(S): 50 CAPSULE, EXTENDED RELEASE ORAL at 23:31

## 2022-01-01 RX ADMIN — MORPHINE SULFATE 5 MILLIGRAM(S): 50 CAPSULE, EXTENDED RELEASE ORAL at 02:20

## 2022-01-01 RX ADMIN — MORPHINE SULFATE 5 MILLIGRAM(S): 50 CAPSULE, EXTENDED RELEASE ORAL at 22:19

## 2022-01-01 RX ADMIN — Medication 10 MILLIGRAM(S): at 16:17

## 2022-01-01 RX ADMIN — SIMETHICONE 80 MILLIGRAM(S): 80 TABLET, CHEWABLE ORAL at 12:29

## 2022-01-01 RX ADMIN — PANTOPRAZOLE SODIUM 40 MILLIGRAM(S): 20 TABLET, DELAYED RELEASE ORAL at 05:19

## 2022-01-01 RX ADMIN — MORPHINE SULFATE 5 MILLIGRAM(S): 50 CAPSULE, EXTENDED RELEASE ORAL at 18:38

## 2022-01-01 RX ADMIN — MORPHINE SULFATE 5 MILLIGRAM(S): 50 CAPSULE, EXTENDED RELEASE ORAL at 07:27

## 2022-01-01 RX ADMIN — Medication 650 MILLIGRAM(S): at 06:57

## 2022-01-01 RX ADMIN — Medication 325 MILLIGRAM(S): at 18:20

## 2022-01-01 RX ADMIN — Medication 650 MILLIGRAM(S): at 01:30

## 2022-01-01 RX ADMIN — Medication 650 MILLIGRAM(S): at 06:23

## 2022-01-01 RX ADMIN — Medication 650 MILLIGRAM(S): at 18:00

## 2022-01-01 RX ADMIN — CEFTRIAXONE 100 MILLIGRAM(S): 500 INJECTION, POWDER, FOR SOLUTION INTRAMUSCULAR; INTRAVENOUS at 18:11

## 2022-01-01 RX ADMIN — Medication 650 MILLIGRAM(S): at 06:39

## 2022-01-01 RX ADMIN — CHLORHEXIDINE GLUCONATE 1 APPLICATION(S): 213 SOLUTION TOPICAL at 12:53

## 2022-01-01 RX ADMIN — MORPHINE SULFATE 5 MILLIGRAM(S): 50 CAPSULE, EXTENDED RELEASE ORAL at 16:01

## 2022-01-01 RX ADMIN — Medication 650 MILLIGRAM(S): at 11:30

## 2022-01-01 RX ADMIN — Medication 325 MILLIGRAM(S): at 17:20

## 2022-01-01 RX ADMIN — MORPHINE SULFATE 5 MILLIGRAM(S): 50 CAPSULE, EXTENDED RELEASE ORAL at 22:09

## 2022-01-01 RX ADMIN — MORPHINE SULFATE 5 MILLIGRAM(S): 50 CAPSULE, EXTENDED RELEASE ORAL at 20:35

## 2022-01-01 RX ADMIN — Medication 650 MILLIGRAM(S): at 21:35

## 2022-01-01 RX ADMIN — MORPHINE SULFATE 5 MILLIGRAM(S): 50 CAPSULE, EXTENDED RELEASE ORAL at 22:32

## 2022-01-01 RX ADMIN — Medication 325 MILLIGRAM(S): at 13:09

## 2022-01-01 RX ADMIN — Medication 650 MILLIGRAM(S): at 20:48

## 2022-01-01 RX ADMIN — Medication 650 MILLIGRAM(S): at 14:52

## 2022-01-01 RX ADMIN — Medication 650 MILLIGRAM(S): at 05:20

## 2022-01-01 RX ADMIN — Medication 650 MILLIGRAM(S): at 15:46

## 2022-01-01 RX ADMIN — Medication 650 MILLIGRAM(S): at 09:15

## 2022-01-01 RX ADMIN — Medication 650 MILLIGRAM(S): at 07:23

## 2022-01-01 RX ADMIN — MORPHINE SULFATE 5 MILLIGRAM(S): 50 CAPSULE, EXTENDED RELEASE ORAL at 08:34

## 2022-01-01 RX ADMIN — MORPHINE SULFATE 5 MILLIGRAM(S): 50 CAPSULE, EXTENDED RELEASE ORAL at 11:49

## 2022-01-01 RX ADMIN — Medication 650 MILLIGRAM(S): at 05:51

## 2022-01-01 RX ADMIN — Medication 325 MILLIGRAM(S): at 21:15

## 2022-01-01 RX ADMIN — MORPHINE SULFATE 5 MILLIGRAM(S): 50 CAPSULE, EXTENDED RELEASE ORAL at 00:44

## 2022-01-01 RX ADMIN — Medication 650 MILLIGRAM(S): at 08:23

## 2022-01-01 RX ADMIN — MORPHINE SULFATE 5 MILLIGRAM(S): 50 CAPSULE, EXTENDED RELEASE ORAL at 21:57

## 2022-01-01 RX ADMIN — PANTOPRAZOLE SODIUM 40 MILLIGRAM(S): 20 TABLET, DELAYED RELEASE ORAL at 07:06

## 2022-01-01 RX ADMIN — Medication 650 MILLIGRAM(S): at 00:15

## 2022-01-01 RX ADMIN — MORPHINE SULFATE 5 MILLIGRAM(S): 50 CAPSULE, EXTENDED RELEASE ORAL at 06:30

## 2022-01-01 RX ADMIN — MORPHINE SULFATE 5 MILLIGRAM(S): 50 CAPSULE, EXTENDED RELEASE ORAL at 12:01

## 2022-01-01 RX ADMIN — Medication 325 MILLIGRAM(S): at 04:07

## 2022-01-01 RX ADMIN — TRAMADOL HYDROCHLORIDE 50 MILLIGRAM(S): 50 TABLET ORAL at 18:10

## 2022-01-01 RX ADMIN — MORPHINE SULFATE 7.5 MILLIGRAM(S): 50 CAPSULE, EXTENDED RELEASE ORAL at 18:29

## 2022-01-01 RX ADMIN — Medication 650 MILLIGRAM(S): at 22:17

## 2022-01-01 RX ADMIN — MORPHINE SULFATE 5 MILLIGRAM(S): 50 CAPSULE, EXTENDED RELEASE ORAL at 10:58

## 2022-01-01 RX ADMIN — Medication 650 MILLIGRAM(S): at 15:30

## 2022-01-01 RX ADMIN — TRAMADOL HYDROCHLORIDE 25 MILLIGRAM(S): 50 TABLET ORAL at 22:35

## 2022-01-01 RX ADMIN — Medication 500 MILLIGRAM(S): at 15:09

## 2022-01-01 RX ADMIN — Medication 650 MILLIGRAM(S): at 02:25

## 2022-01-01 RX ADMIN — MORPHINE SULFATE 5 MILLIGRAM(S): 50 CAPSULE, EXTENDED RELEASE ORAL at 23:48

## 2022-01-01 RX ADMIN — Medication 325 MILLIGRAM(S): at 14:12

## 2022-01-01 RX ADMIN — Medication 650 MILLIGRAM(S): at 16:00

## 2022-01-01 RX ADMIN — MORPHINE SULFATE 5 MILLIGRAM(S): 50 CAPSULE, EXTENDED RELEASE ORAL at 08:00

## 2022-01-01 RX ADMIN — MORPHINE SULFATE 5 MILLIGRAM(S): 50 CAPSULE, EXTENDED RELEASE ORAL at 12:08

## 2022-01-01 RX ADMIN — SENNA PLUS 2 TABLET(S): 8.6 TABLET ORAL at 21:09

## 2022-01-01 RX ADMIN — Medication 650 MILLIGRAM(S): at 07:20

## 2022-01-01 RX ADMIN — MORPHINE SULFATE 5 MILLIGRAM(S): 50 CAPSULE, EXTENDED RELEASE ORAL at 18:21

## 2022-01-01 RX ADMIN — MORPHINE SULFATE 5 MILLIGRAM(S): 50 CAPSULE, EXTENDED RELEASE ORAL at 12:03

## 2022-01-01 RX ADMIN — Medication 325 MILLIGRAM(S): at 20:35

## 2022-01-01 RX ADMIN — MORPHINE SULFATE 5 MILLIGRAM(S): 50 CAPSULE, EXTENDED RELEASE ORAL at 18:05

## 2022-01-01 RX ADMIN — Medication 325 MILLIGRAM(S): at 23:30

## 2022-01-01 RX ADMIN — Medication 650 MILLIGRAM(S): at 00:20

## 2022-01-01 RX ADMIN — SENNA PLUS 2 TABLET(S): 8.6 TABLET ORAL at 22:35

## 2022-01-01 RX ADMIN — MORPHINE SULFATE 5 MILLIGRAM(S): 50 CAPSULE, EXTENDED RELEASE ORAL at 10:45

## 2022-01-01 RX ADMIN — TRAMADOL HYDROCHLORIDE 25 MILLIGRAM(S): 50 TABLET ORAL at 07:44

## 2022-01-01 RX ADMIN — Medication 650 MILLIGRAM(S): at 18:59

## 2022-01-01 RX ADMIN — Medication 650 MILLIGRAM(S): at 06:16

## 2022-01-01 RX ADMIN — GLUCAGON INJECTION, SOLUTION 1 MILLIGRAM(S): 0.5 INJECTION, SOLUTION SUBCUTANEOUS at 08:26

## 2022-01-01 RX ADMIN — Medication 10 MILLIGRAM(S): at 10:46

## 2022-01-01 RX ADMIN — Medication 650 MILLIGRAM(S): at 06:51

## 2022-01-01 RX ADMIN — SENNA PLUS 2 TABLET(S): 8.6 TABLET ORAL at 22:11

## 2022-01-01 RX ADMIN — MORPHINE SULFATE 5 MILLIGRAM(S): 50 CAPSULE, EXTENDED RELEASE ORAL at 04:53

## 2022-01-01 RX ADMIN — MORPHINE SULFATE 7.5 MILLIGRAM(S): 50 CAPSULE, EXTENDED RELEASE ORAL at 19:50

## 2022-01-01 RX ADMIN — Medication 650 MILLIGRAM(S): at 23:33

## 2022-01-01 RX ADMIN — Medication 650 MILLIGRAM(S): at 12:42

## 2022-01-01 RX ADMIN — Medication 1000 UNIT(S): at 16:34

## 2022-01-01 RX ADMIN — Medication 650 MILLIGRAM(S): at 01:54

## 2022-01-01 RX ADMIN — TRAMADOL HYDROCHLORIDE 25 MILLIGRAM(S): 50 TABLET ORAL at 02:35

## 2022-01-01 RX ADMIN — Medication 650 MILLIGRAM(S): at 14:25

## 2022-01-01 RX ADMIN — SENNA PLUS 2 TABLET(S): 8.6 TABLET ORAL at 22:53

## 2022-01-01 RX ADMIN — Medication 650 MILLIGRAM(S): at 07:06

## 2022-01-01 RX ADMIN — MORPHINE SULFATE 7.5 MILLIGRAM(S): 50 CAPSULE, EXTENDED RELEASE ORAL at 11:40

## 2022-01-01 RX ADMIN — Medication 650 MILLIGRAM(S): at 17:03

## 2022-01-01 RX ADMIN — MORPHINE SULFATE 5 MILLIGRAM(S): 50 CAPSULE, EXTENDED RELEASE ORAL at 09:42

## 2022-01-01 RX ADMIN — MORPHINE SULFATE 5 MILLIGRAM(S): 50 CAPSULE, EXTENDED RELEASE ORAL at 18:02

## 2022-01-01 RX ADMIN — Medication 25 MILLIGRAM(S): at 11:12

## 2022-01-01 RX ADMIN — CHLORHEXIDINE GLUCONATE 1 APPLICATION(S): 213 SOLUTION TOPICAL at 11:49

## 2022-01-01 RX ADMIN — SENNA PLUS 2 TABLET(S): 8.6 TABLET ORAL at 21:47

## 2022-01-01 RX ADMIN — Medication 1 MILLIGRAM(S): at 12:55

## 2022-01-01 RX ADMIN — Medication 1 MILLIGRAM(S): at 12:22

## 2022-01-01 RX ADMIN — Medication 650 MILLIGRAM(S): at 19:08

## 2022-01-01 RX ADMIN — Medication 325 MILLIGRAM(S): at 12:00

## 2022-01-01 RX ADMIN — Medication 5 MILLIGRAM(S): at 09:55

## 2022-01-01 RX ADMIN — MORPHINE SULFATE 5 MILLIGRAM(S): 50 CAPSULE, EXTENDED RELEASE ORAL at 14:14

## 2022-01-01 RX ADMIN — Medication 1 MILLIGRAM(S): at 11:39

## 2022-01-01 RX ADMIN — PANTOPRAZOLE SODIUM 40 MILLIGRAM(S): 20 TABLET, DELAYED RELEASE ORAL at 10:06

## 2022-01-01 RX ADMIN — CEFEPIME 100 MILLIGRAM(S): 1 INJECTION, POWDER, FOR SOLUTION INTRAMUSCULAR; INTRAVENOUS at 04:48

## 2022-01-01 RX ADMIN — MORPHINE SULFATE 5 MILLIGRAM(S): 50 CAPSULE, EXTENDED RELEASE ORAL at 01:11

## 2022-01-01 RX ADMIN — Medication 500 MILLIGRAM(S): at 06:18

## 2022-01-01 RX ADMIN — CEFEPIME 100 MILLIGRAM(S): 1 INJECTION, POWDER, FOR SOLUTION INTRAMUSCULAR; INTRAVENOUS at 15:31

## 2022-01-01 RX ADMIN — Medication 325 MILLIGRAM(S): at 22:52

## 2022-01-01 RX ADMIN — Medication 650 MILLIGRAM(S): at 18:54

## 2022-01-01 RX ADMIN — Medication 650 MILLIGRAM(S): at 17:19

## 2022-01-01 RX ADMIN — SODIUM CHLORIDE 1000 MILLILITER(S): 9 INJECTION INTRAMUSCULAR; INTRAVENOUS; SUBCUTANEOUS at 15:28

## 2022-01-01 RX ADMIN — Medication 650 MILLIGRAM(S): at 22:18

## 2022-01-01 RX ADMIN — Medication 650 MILLIGRAM(S): at 06:01

## 2022-01-01 RX ADMIN — Medication 325 MILLIGRAM(S): at 19:47

## 2022-01-01 RX ADMIN — Medication 650 MILLIGRAM(S): at 01:34

## 2022-01-01 RX ADMIN — Medication 325 MILLIGRAM(S): at 03:22

## 2022-01-01 RX ADMIN — LACTULOSE 10 GRAM(S): 10 SOLUTION ORAL at 20:24

## 2022-01-01 RX ADMIN — Medication 650 MILLIGRAM(S): at 12:10

## 2022-01-01 RX ADMIN — MORPHINE SULFATE 5 MILLIGRAM(S): 50 CAPSULE, EXTENDED RELEASE ORAL at 03:10

## 2022-01-01 RX ADMIN — LACTULOSE 15 GRAM(S): 10 SOLUTION ORAL at 12:56

## 2022-01-01 RX ADMIN — Medication 650 MILLIGRAM(S): at 07:04

## 2022-01-01 RX ADMIN — FENTANYL CITRATE 25 MICROGRAM(S): 50 INJECTION INTRAVENOUS at 11:42

## 2022-01-01 RX ADMIN — Medication 1000 UNIT(S): at 12:26

## 2022-01-01 RX ADMIN — Medication 1 TABLET(S): at 13:50

## 2022-01-01 RX ADMIN — Medication 650 MILLIGRAM(S): at 01:09

## 2022-01-01 RX ADMIN — ENOXAPARIN SODIUM 40 MILLIGRAM(S): 100 INJECTION SUBCUTANEOUS at 05:53

## 2022-01-01 RX ADMIN — Medication 325 MILLIGRAM(S): at 06:31

## 2022-01-01 RX ADMIN — Medication 325 MILLIGRAM(S): at 13:55

## 2022-01-01 RX ADMIN — Medication 325 MILLIGRAM(S): at 00:22

## 2022-01-01 RX ADMIN — Medication 2000 UNIT(S): at 11:39

## 2022-01-01 RX ADMIN — Medication 250 MILLIGRAM(S): at 17:14

## 2022-01-01 RX ADMIN — Medication 250 MILLIGRAM(S): at 17:19

## 2022-01-01 RX ADMIN — MORPHINE SULFATE 5 MILLIGRAM(S): 50 CAPSULE, EXTENDED RELEASE ORAL at 05:43

## 2022-01-01 RX ADMIN — TRAMADOL HYDROCHLORIDE 50 MILLIGRAM(S): 50 TABLET ORAL at 09:36

## 2022-01-01 RX ADMIN — Medication 650 MILLIGRAM(S): at 16:01

## 2022-01-01 RX ADMIN — SODIUM CHLORIDE 500 MILLILITER(S): 9 INJECTION INTRAMUSCULAR; INTRAVENOUS; SUBCUTANEOUS at 17:10

## 2022-01-01 RX ADMIN — CEFEPIME 100 MILLIGRAM(S): 1 INJECTION, POWDER, FOR SOLUTION INTRAMUSCULAR; INTRAVENOUS at 22:51

## 2022-01-01 RX ADMIN — Medication 1 MILLIGRAM(S): at 12:51

## 2022-01-01 RX ADMIN — SENNA PLUS 2 TABLET(S): 8.6 TABLET ORAL at 21:06

## 2022-01-01 RX ADMIN — Medication 650 MILLIGRAM(S): at 11:31

## 2022-01-01 RX ADMIN — MORPHINE SULFATE 5 MILLIGRAM(S): 50 CAPSULE, EXTENDED RELEASE ORAL at 10:00

## 2022-01-01 RX ADMIN — Medication 325 MILLIGRAM(S): at 20:27

## 2022-01-01 RX ADMIN — Medication 10 MILLIGRAM(S): at 21:17

## 2022-01-01 RX ADMIN — Medication 650 MILLIGRAM(S): at 02:49

## 2022-01-01 RX ADMIN — Medication 325 MILLIGRAM(S): at 21:57

## 2022-01-01 RX ADMIN — CEFEPIME 100 MILLIGRAM(S): 1 INJECTION, POWDER, FOR SOLUTION INTRAMUSCULAR; INTRAVENOUS at 18:25

## 2022-01-01 RX ADMIN — Medication 325 MILLIGRAM(S): at 11:22

## 2022-01-01 RX ADMIN — Medication 1 MILLIGRAM(S): at 11:22

## 2022-01-01 RX ADMIN — Medication 325 MILLIGRAM(S): at 16:07

## 2022-01-01 RX ADMIN — Medication 650 MILLIGRAM(S): at 19:27

## 2022-01-01 RX ADMIN — Medication 650 MILLIGRAM(S): at 05:24

## 2022-01-01 RX ADMIN — Medication 650 MILLIGRAM(S): at 16:32

## 2022-01-01 RX ADMIN — MORPHINE SULFATE 5 MILLIGRAM(S): 50 CAPSULE, EXTENDED RELEASE ORAL at 09:45

## 2022-01-01 RX ADMIN — MORPHINE SULFATE 5 MILLIGRAM(S): 50 CAPSULE, EXTENDED RELEASE ORAL at 16:20

## 2022-01-01 RX ADMIN — MORPHINE SULFATE 5 MILLIGRAM(S): 50 CAPSULE, EXTENDED RELEASE ORAL at 21:21

## 2022-01-01 RX ADMIN — MORPHINE SULFATE 5 MILLIGRAM(S): 50 CAPSULE, EXTENDED RELEASE ORAL at 11:43

## 2022-01-01 RX ADMIN — MORPHINE SULFATE 5 MILLIGRAM(S): 50 CAPSULE, EXTENDED RELEASE ORAL at 22:27

## 2022-01-01 RX ADMIN — Medication 650 MILLIGRAM(S): at 02:24

## 2022-01-01 RX ADMIN — Medication 325 MILLIGRAM(S): at 05:06

## 2022-01-01 RX ADMIN — TRAMADOL HYDROCHLORIDE 25 MILLIGRAM(S): 50 TABLET ORAL at 23:30

## 2022-01-01 RX ADMIN — CEFEPIME 100 MILLIGRAM(S): 1 INJECTION, POWDER, FOR SOLUTION INTRAMUSCULAR; INTRAVENOUS at 16:34

## 2022-01-01 RX ADMIN — SENNA PLUS 2 TABLET(S): 8.6 TABLET ORAL at 22:07

## 2022-01-01 RX ADMIN — MORPHINE SULFATE 5 MILLIGRAM(S): 50 CAPSULE, EXTENDED RELEASE ORAL at 20:20

## 2022-01-01 RX ADMIN — Medication 325 MILLIGRAM(S): at 02:28

## 2022-01-01 RX ADMIN — SODIUM CHLORIDE 50 MILLILITER(S): 9 INJECTION, SOLUTION INTRAVENOUS at 03:09

## 2022-01-01 RX ADMIN — Medication 650 MILLIGRAM(S): at 00:47

## 2022-01-01 RX ADMIN — MORPHINE SULFATE 5 MILLIGRAM(S): 50 CAPSULE, EXTENDED RELEASE ORAL at 10:29

## 2022-01-01 RX ADMIN — MORPHINE SULFATE 7.5 MILLIGRAM(S): 50 CAPSULE, EXTENDED RELEASE ORAL at 18:47

## 2022-01-01 RX ADMIN — MORPHINE SULFATE 5 MILLIGRAM(S): 50 CAPSULE, EXTENDED RELEASE ORAL at 14:19

## 2022-01-01 RX ADMIN — IRON SUCROSE 210 MILLIGRAM(S): 20 INJECTION, SOLUTION INTRAVENOUS at 19:30

## 2022-01-01 RX ADMIN — Medication 650 MILLIGRAM(S): at 10:45

## 2022-01-01 RX ADMIN — Medication 325 MILLIGRAM(S): at 14:34

## 2022-01-01 RX ADMIN — MORPHINE SULFATE 5 MILLIGRAM(S): 50 CAPSULE, EXTENDED RELEASE ORAL at 05:08

## 2022-01-01 RX ADMIN — MORPHINE SULFATE 5 MILLIGRAM(S): 50 CAPSULE, EXTENDED RELEASE ORAL at 17:38

## 2022-01-01 RX ADMIN — MORPHINE SULFATE 5 MILLIGRAM(S): 50 CAPSULE, EXTENDED RELEASE ORAL at 19:24

## 2022-01-01 RX ADMIN — Medication 500 MILLIGRAM(S): at 15:10

## 2022-01-01 RX ADMIN — Medication 650 MILLIGRAM(S): at 14:08

## 2022-01-01 RX ADMIN — Medication 325 MILLIGRAM(S): at 10:24

## 2022-01-01 RX ADMIN — MORPHINE SULFATE 5 MILLIGRAM(S): 50 CAPSULE, EXTENDED RELEASE ORAL at 08:13

## 2022-01-01 RX ADMIN — Medication 325 MILLIGRAM(S): at 15:10

## 2022-01-01 RX ADMIN — MORPHINE SULFATE 5 MILLIGRAM(S): 50 CAPSULE, EXTENDED RELEASE ORAL at 13:47

## 2022-01-01 RX ADMIN — Medication 325 MILLIGRAM(S): at 03:09

## 2022-01-01 RX ADMIN — Medication 5 MILLIGRAM(S): at 21:40

## 2022-01-01 RX ADMIN — Medication 250 MILLIGRAM(S): at 09:48

## 2022-01-01 RX ADMIN — CEFEPIME 100 MILLIGRAM(S): 1 INJECTION, POWDER, FOR SOLUTION INTRAMUSCULAR; INTRAVENOUS at 23:02

## 2022-01-01 RX ADMIN — Medication 650 MILLIGRAM(S): at 18:24

## 2022-01-01 RX ADMIN — TRAMADOL HYDROCHLORIDE 50 MILLIGRAM(S): 50 TABLET ORAL at 08:22

## 2022-01-01 RX ADMIN — Medication 650 MILLIGRAM(S): at 03:09

## 2022-01-01 RX ADMIN — Medication 650 MILLIGRAM(S): at 07:25

## 2022-01-01 RX ADMIN — MORPHINE SULFATE 5 MILLIGRAM(S): 50 CAPSULE, EXTENDED RELEASE ORAL at 11:40

## 2022-01-01 RX ADMIN — Medication 650 MILLIGRAM(S): at 23:11

## 2022-01-01 RX ADMIN — MORPHINE SULFATE 1 MILLIGRAM(S): 50 CAPSULE, EXTENDED RELEASE ORAL at 05:51

## 2022-01-01 RX ADMIN — CEFEPIME 100 MILLIGRAM(S): 1 INJECTION, POWDER, FOR SOLUTION INTRAMUSCULAR; INTRAVENOUS at 14:04

## 2022-01-01 RX ADMIN — Medication 650 MILLIGRAM(S): at 23:42

## 2022-01-01 RX ADMIN — Medication 650 MILLIGRAM(S): at 22:00

## 2022-01-01 RX ADMIN — Medication 325 MILLIGRAM(S): at 07:50

## 2022-01-01 RX ADMIN — SODIUM CHLORIDE 1000 MILLILITER(S): 9 INJECTION INTRAMUSCULAR; INTRAVENOUS; SUBCUTANEOUS at 06:15

## 2022-01-01 RX ADMIN — SENNA PLUS 2 TABLET(S): 8.6 TABLET ORAL at 21:19

## 2022-01-01 RX ADMIN — Medication 325 MILLIGRAM(S): at 15:22

## 2022-01-01 RX ADMIN — POLYETHYLENE GLYCOL 3350 17 GRAM(S): 17 POWDER, FOR SOLUTION ORAL at 11:33

## 2022-01-01 RX ADMIN — Medication 650 MILLIGRAM(S): at 20:42

## 2022-01-01 RX ADMIN — Medication 1 MILLIGRAM(S): at 11:52

## 2022-01-01 RX ADMIN — MORPHINE SULFATE 5 MILLIGRAM(S): 50 CAPSULE, EXTENDED RELEASE ORAL at 07:31

## 2022-01-01 RX ADMIN — MORPHINE SULFATE 5 MILLIGRAM(S): 50 CAPSULE, EXTENDED RELEASE ORAL at 13:37

## 2022-01-01 RX ADMIN — TRAMADOL HYDROCHLORIDE 25 MILLIGRAM(S): 50 TABLET ORAL at 15:12

## 2022-01-01 RX ADMIN — PANTOPRAZOLE SODIUM 40 MILLIGRAM(S): 20 TABLET, DELAYED RELEASE ORAL at 05:38

## 2022-01-01 RX ADMIN — Medication 10 MILLIGRAM(S): at 02:49

## 2022-01-01 RX ADMIN — Medication 650 MILLIGRAM(S): at 18:30

## 2022-01-01 RX ADMIN — LINEZOLID 300 MILLIGRAM(S): 600 INJECTION, SOLUTION INTRAVENOUS at 05:43

## 2022-01-01 RX ADMIN — Medication 325 MILLIGRAM(S): at 01:39

## 2022-01-01 RX ADMIN — Medication 500 MILLIGRAM(S): at 22:10

## 2022-01-01 RX ADMIN — TRAMADOL HYDROCHLORIDE 50 MILLIGRAM(S): 50 TABLET ORAL at 08:52

## 2022-01-01 RX ADMIN — Medication 25 MILLIGRAM(S): at 16:41

## 2022-01-01 RX ADMIN — Medication 325 MILLIGRAM(S): at 12:03

## 2022-01-01 RX ADMIN — TRAMADOL HYDROCHLORIDE 25 MILLIGRAM(S): 50 TABLET ORAL at 12:55

## 2022-01-01 RX ADMIN — Medication 1 MILLIGRAM(S): at 11:41

## 2022-01-01 RX ADMIN — MORPHINE SULFATE 5 MILLIGRAM(S): 50 CAPSULE, EXTENDED RELEASE ORAL at 22:53

## 2022-01-01 RX ADMIN — TRAMADOL HYDROCHLORIDE 25 MILLIGRAM(S): 50 TABLET ORAL at 05:35

## 2022-01-01 RX ADMIN — ENOXAPARIN SODIUM 40 MILLIGRAM(S): 100 INJECTION SUBCUTANEOUS at 05:24

## 2022-01-01 RX ADMIN — Medication 650 MILLIGRAM(S): at 23:39

## 2022-01-01 RX ADMIN — Medication 325 MILLIGRAM(S): at 21:19

## 2022-01-01 RX ADMIN — CHLORHEXIDINE GLUCONATE 1 APPLICATION(S): 213 SOLUTION TOPICAL at 10:36

## 2022-01-01 RX ADMIN — Medication 325 MILLIGRAM(S): at 08:30

## 2022-01-01 RX ADMIN — Medication 650 MILLIGRAM(S): at 19:30

## 2022-01-01 RX ADMIN — Medication 325 MILLIGRAM(S): at 18:12

## 2022-01-01 RX ADMIN — Medication 650 MILLIGRAM(S): at 00:42

## 2022-01-01 RX ADMIN — MORPHINE SULFATE 5 MILLIGRAM(S): 50 CAPSULE, EXTENDED RELEASE ORAL at 14:35

## 2022-01-01 RX ADMIN — TRAMADOL HYDROCHLORIDE 50 MILLIGRAM(S): 50 TABLET ORAL at 17:40

## 2022-01-01 RX ADMIN — MORPHINE SULFATE 5 MILLIGRAM(S): 50 CAPSULE, EXTENDED RELEASE ORAL at 15:00

## 2022-01-01 RX ADMIN — Medication 650 MILLIGRAM(S): at 03:00

## 2022-01-01 RX ADMIN — Medication 650 MILLIGRAM(S): at 13:18

## 2022-01-01 RX ADMIN — SODIUM CHLORIDE 80 MILLILITER(S): 9 INJECTION INTRAMUSCULAR; INTRAVENOUS; SUBCUTANEOUS at 18:22

## 2022-01-01 RX ADMIN — PANTOPRAZOLE SODIUM 40 MILLIGRAM(S): 20 TABLET, DELAYED RELEASE ORAL at 17:56

## 2022-01-01 RX ADMIN — Medication 650 MILLIGRAM(S): at 17:33

## 2022-01-01 RX ADMIN — MORPHINE SULFATE 5 MILLIGRAM(S): 50 CAPSULE, EXTENDED RELEASE ORAL at 06:42

## 2022-01-01 RX ADMIN — Medication 650 MILLIGRAM(S): at 23:40

## 2022-01-01 RX ADMIN — MORPHINE SULFATE 5 MILLIGRAM(S): 50 CAPSULE, EXTENDED RELEASE ORAL at 17:29

## 2022-01-01 RX ADMIN — Medication 325 MILLIGRAM(S): at 04:02

## 2022-01-01 RX ADMIN — MORPHINE SULFATE 5 MILLIGRAM(S): 50 CAPSULE, EXTENDED RELEASE ORAL at 21:07

## 2022-01-01 RX ADMIN — Medication 650 MILLIGRAM(S): at 17:10

## 2022-01-01 RX ADMIN — Medication 2000 UNIT(S): at 11:45

## 2022-01-01 RX ADMIN — Medication 500 MILLIGRAM(S): at 21:06

## 2022-01-01 RX ADMIN — MORPHINE SULFATE 5 MILLIGRAM(S): 50 CAPSULE, EXTENDED RELEASE ORAL at 23:41

## 2022-01-01 RX ADMIN — TRAMADOL HYDROCHLORIDE 25 MILLIGRAM(S): 50 TABLET ORAL at 23:21

## 2022-01-01 RX ADMIN — Medication 325 MILLIGRAM(S): at 12:24

## 2022-01-01 RX ADMIN — MORPHINE SULFATE 7.5 MILLIGRAM(S): 50 CAPSULE, EXTENDED RELEASE ORAL at 15:50

## 2022-01-01 RX ADMIN — MORPHINE SULFATE 5 MILLIGRAM(S): 50 CAPSULE, EXTENDED RELEASE ORAL at 21:45

## 2022-01-01 RX ADMIN — CHLORHEXIDINE GLUCONATE 1 APPLICATION(S): 213 SOLUTION TOPICAL at 10:34

## 2022-01-01 RX ADMIN — Medication 650 MILLIGRAM(S): at 07:43

## 2022-01-01 RX ADMIN — Medication 650 MILLIGRAM(S): at 04:30

## 2022-01-01 RX ADMIN — MORPHINE SULFATE 5 MILLIGRAM(S): 50 CAPSULE, EXTENDED RELEASE ORAL at 20:08

## 2022-01-01 RX ADMIN — TRAMADOL HYDROCHLORIDE 25 MILLIGRAM(S): 50 TABLET ORAL at 16:30

## 2022-01-01 RX ADMIN — Medication 325 MILLIGRAM(S): at 01:33

## 2022-01-01 RX ADMIN — MORPHINE SULFATE 5 MILLIGRAM(S): 50 CAPSULE, EXTENDED RELEASE ORAL at 02:33

## 2022-01-01 RX ADMIN — Medication 650 MILLIGRAM(S): at 04:07

## 2022-01-01 RX ADMIN — POLYETHYLENE GLYCOL 3350 17 GRAM(S): 17 POWDER, FOR SOLUTION ORAL at 13:49

## 2022-01-01 RX ADMIN — MORPHINE SULFATE 5 MILLIGRAM(S): 50 CAPSULE, EXTENDED RELEASE ORAL at 18:24

## 2022-01-01 RX ADMIN — MORPHINE SULFATE 2 MILLIGRAM(S): 50 CAPSULE, EXTENDED RELEASE ORAL at 16:37

## 2022-01-01 RX ADMIN — MORPHINE SULFATE 2 MILLIGRAM(S): 50 CAPSULE, EXTENDED RELEASE ORAL at 17:30

## 2022-01-01 RX ADMIN — IRON SUCROSE 210 MILLIGRAM(S): 20 INJECTION, SOLUTION INTRAVENOUS at 19:43

## 2022-01-01 RX ADMIN — Medication 650 MILLIGRAM(S): at 01:00

## 2022-01-01 RX ADMIN — MORPHINE SULFATE 5 MILLIGRAM(S): 50 CAPSULE, EXTENDED RELEASE ORAL at 07:34

## 2022-01-01 RX ADMIN — SIMETHICONE 80 MILLIGRAM(S): 80 TABLET, CHEWABLE ORAL at 13:11

## 2022-01-01 RX ADMIN — Medication 650 MILLIGRAM(S): at 15:16

## 2022-01-01 RX ADMIN — Medication 650 MILLIGRAM(S): at 07:18

## 2022-01-01 RX ADMIN — MORPHINE SULFATE 5 MILLIGRAM(S): 50 CAPSULE, EXTENDED RELEASE ORAL at 10:40

## 2022-01-01 RX ADMIN — MORPHINE SULFATE 5 MILLIGRAM(S): 50 CAPSULE, EXTENDED RELEASE ORAL at 06:08

## 2022-01-01 RX ADMIN — Medication 250 MILLIGRAM(S): at 00:58

## 2022-01-01 RX ADMIN — Medication 325 MILLIGRAM(S): at 10:40

## 2022-01-01 RX ADMIN — CEFEPIME 100 MILLIGRAM(S): 1 INJECTION, POWDER, FOR SOLUTION INTRAMUSCULAR; INTRAVENOUS at 15:49

## 2022-01-01 RX ADMIN — Medication 650 MILLIGRAM(S): at 08:29

## 2022-01-01 RX ADMIN — Medication 650 MILLIGRAM(S): at 03:04

## 2022-01-01 RX ADMIN — Medication 650 MILLIGRAM(S): at 22:32

## 2022-01-01 RX ADMIN — MORPHINE SULFATE 5 MILLIGRAM(S): 50 CAPSULE, EXTENDED RELEASE ORAL at 14:22

## 2022-01-01 RX ADMIN — MORPHINE SULFATE 5 MILLIGRAM(S): 50 CAPSULE, EXTENDED RELEASE ORAL at 06:16

## 2022-01-01 RX ADMIN — Medication 650 MILLIGRAM(S): at 14:00

## 2022-01-01 RX ADMIN — MORPHINE SULFATE 5 MILLIGRAM(S): 50 CAPSULE, EXTENDED RELEASE ORAL at 08:49

## 2022-01-01 RX ADMIN — Medication 650 MILLIGRAM(S): at 21:43

## 2022-01-01 RX ADMIN — Medication 650 MILLIGRAM(S): at 10:37

## 2022-01-01 RX ADMIN — Medication 650 MILLIGRAM(S): at 18:55

## 2022-01-01 RX ADMIN — Medication 650 MILLIGRAM(S): at 02:02

## 2022-01-01 RX ADMIN — MORPHINE SULFATE 5 MILLIGRAM(S): 50 CAPSULE, EXTENDED RELEASE ORAL at 21:38

## 2022-01-01 RX ADMIN — Medication 650 MILLIGRAM(S): at 18:19

## 2022-01-01 RX ADMIN — Medication 1 MILLIGRAM(S): at 12:11

## 2022-01-01 RX ADMIN — MORPHINE SULFATE 5 MILLIGRAM(S): 50 CAPSULE, EXTENDED RELEASE ORAL at 20:58

## 2022-01-01 RX ADMIN — Medication 650 MILLIGRAM(S): at 21:58

## 2022-01-01 RX ADMIN — Medication 325 MILLIGRAM(S): at 10:00

## 2022-01-01 RX ADMIN — Medication 325 MILLIGRAM(S): at 08:18

## 2022-01-01 RX ADMIN — SODIUM CHLORIDE 75 MILLILITER(S): 9 INJECTION, SOLUTION INTRAVENOUS at 13:24

## 2022-01-01 RX ADMIN — Medication 650 MILLIGRAM(S): at 23:48

## 2022-01-01 RX ADMIN — Medication 650 MILLIGRAM(S): at 07:57

## 2022-01-01 RX ADMIN — PANTOPRAZOLE SODIUM 40 MILLIGRAM(S): 20 TABLET, DELAYED RELEASE ORAL at 06:22

## 2022-01-01 RX ADMIN — Medication 650 MILLIGRAM(S): at 23:56

## 2022-01-01 RX ADMIN — Medication 650 MILLIGRAM(S): at 00:02

## 2022-01-01 RX ADMIN — MORPHINE SULFATE 5 MILLIGRAM(S): 50 CAPSULE, EXTENDED RELEASE ORAL at 09:30

## 2022-01-01 RX ADMIN — Medication 650 MILLIGRAM(S): at 21:05

## 2022-01-01 RX ADMIN — MORPHINE SULFATE 5 MILLIGRAM(S): 50 CAPSULE, EXTENDED RELEASE ORAL at 17:40

## 2022-01-01 RX ADMIN — MORPHINE SULFATE 5 MILLIGRAM(S): 50 CAPSULE, EXTENDED RELEASE ORAL at 12:44

## 2022-01-01 RX ADMIN — Medication 2000 UNIT(S): at 11:52

## 2022-01-01 RX ADMIN — Medication 325 MILLIGRAM(S): at 13:45

## 2022-01-01 RX ADMIN — MORPHINE SULFATE 5 MILLIGRAM(S): 50 CAPSULE, EXTENDED RELEASE ORAL at 03:32

## 2022-01-01 RX ADMIN — Medication 650 MILLIGRAM(S): at 17:01

## 2022-01-01 RX ADMIN — Medication 325 MILLIGRAM(S): at 21:59

## 2022-01-01 RX ADMIN — MORPHINE SULFATE 5 MILLIGRAM(S): 50 CAPSULE, EXTENDED RELEASE ORAL at 01:46

## 2022-01-01 RX ADMIN — SODIUM CHLORIDE 40 MILLILITER(S): 9 INJECTION, SOLUTION INTRAVENOUS at 23:46

## 2022-01-01 RX ADMIN — MORPHINE SULFATE 5 MILLIGRAM(S): 50 CAPSULE, EXTENDED RELEASE ORAL at 03:00

## 2022-01-01 RX ADMIN — CEFTRIAXONE 100 MILLIGRAM(S): 500 INJECTION, POWDER, FOR SOLUTION INTRAMUSCULAR; INTRAVENOUS at 19:32

## 2022-01-01 RX ADMIN — SENNA PLUS 2 TABLET(S): 8.6 TABLET ORAL at 21:56

## 2022-01-01 RX ADMIN — SENNA PLUS 2 TABLET(S): 8.6 TABLET ORAL at 21:49

## 2022-01-01 RX ADMIN — TRAMADOL HYDROCHLORIDE 25 MILLIGRAM(S): 50 TABLET ORAL at 12:10

## 2022-01-01 RX ADMIN — AMPICILLIN SODIUM AND SULBACTAM SODIUM 200 GRAM(S): 250; 125 INJECTION, POWDER, FOR SUSPENSION INTRAMUSCULAR; INTRAVENOUS at 00:20

## 2022-01-01 RX ADMIN — CEFEPIME 100 MILLIGRAM(S): 1 INJECTION, POWDER, FOR SOLUTION INTRAMUSCULAR; INTRAVENOUS at 14:41

## 2022-01-01 RX ADMIN — Medication 650 MILLIGRAM(S): at 19:10

## 2022-01-01 RX ADMIN — Medication 650 MILLIGRAM(S): at 12:12

## 2022-01-01 RX ADMIN — Medication 650 MILLIGRAM(S): at 14:14

## 2022-01-01 RX ADMIN — MORPHINE SULFATE 1 MILLIGRAM(S): 50 CAPSULE, EXTENDED RELEASE ORAL at 11:06

## 2022-01-01 RX ADMIN — SIMETHICONE 80 MILLIGRAM(S): 80 TABLET, CHEWABLE ORAL at 12:16

## 2022-01-01 RX ADMIN — Medication 10 MILLIGRAM(S): at 11:36

## 2022-01-01 RX ADMIN — SIMETHICONE 80 MILLIGRAM(S): 80 TABLET, CHEWABLE ORAL at 17:10

## 2022-01-01 RX ADMIN — Medication 5 MILLIGRAM(S): at 23:11

## 2022-01-01 RX ADMIN — Medication 325 MILLIGRAM(S): at 10:23

## 2022-01-01 RX ADMIN — Medication 650 MILLIGRAM(S): at 05:56

## 2022-01-01 RX ADMIN — SODIUM CHLORIDE 1000 MILLILITER(S): 9 INJECTION INTRAMUSCULAR; INTRAVENOUS; SUBCUTANEOUS at 00:17

## 2022-01-01 RX ADMIN — MORPHINE SULFATE 5 MILLIGRAM(S): 50 CAPSULE, EXTENDED RELEASE ORAL at 03:43

## 2022-01-01 RX ADMIN — SIMETHICONE 80 MILLIGRAM(S): 80 TABLET, CHEWABLE ORAL at 18:15

## 2022-01-01 RX ADMIN — MORPHINE SULFATE 5 MILLIGRAM(S): 50 CAPSULE, EXTENDED RELEASE ORAL at 17:18

## 2022-01-01 RX ADMIN — Medication 650 MILLIGRAM(S): at 18:06

## 2022-01-01 RX ADMIN — Medication 650 MILLIGRAM(S): at 21:12

## 2022-01-01 RX ADMIN — Medication 650 MILLIGRAM(S): at 23:18

## 2022-01-01 RX ADMIN — MORPHINE SULFATE 5 MILLIGRAM(S): 50 CAPSULE, EXTENDED RELEASE ORAL at 21:37

## 2022-01-01 RX ADMIN — Medication 650 MILLIGRAM(S): at 02:57

## 2022-01-01 RX ADMIN — MORPHINE SULFATE 5 MILLIGRAM(S): 50 CAPSULE, EXTENDED RELEASE ORAL at 01:22

## 2022-01-01 RX ADMIN — SODIUM CHLORIDE 60 MILLILITER(S): 9 INJECTION INTRAMUSCULAR; INTRAVENOUS; SUBCUTANEOUS at 16:19

## 2022-01-01 RX ADMIN — Medication 650 MILLIGRAM(S): at 23:50

## 2022-01-01 RX ADMIN — MORPHINE SULFATE 5 MILLIGRAM(S): 50 CAPSULE, EXTENDED RELEASE ORAL at 05:16

## 2022-01-01 RX ADMIN — MORPHINE SULFATE 5 MILLIGRAM(S): 50 CAPSULE, EXTENDED RELEASE ORAL at 09:05

## 2022-01-01 RX ADMIN — Medication 325 MILLIGRAM(S): at 22:27

## 2022-01-01 RX ADMIN — Medication 650 MILLIGRAM(S): at 03:25

## 2022-01-01 RX ADMIN — Medication 650 MILLIGRAM(S): at 00:12

## 2022-01-01 RX ADMIN — Medication 650 MILLIGRAM(S): at 00:44

## 2022-01-01 RX ADMIN — MORPHINE SULFATE 5 MILLIGRAM(S): 50 CAPSULE, EXTENDED RELEASE ORAL at 22:47

## 2022-01-01 RX ADMIN — Medication 650 MILLIGRAM(S): at 13:14

## 2022-01-01 RX ADMIN — Medication 1 TABLET(S): at 11:14

## 2022-01-01 RX ADMIN — Medication 650 MILLIGRAM(S): at 13:46

## 2022-01-01 RX ADMIN — Medication 650 MILLIGRAM(S): at 22:39

## 2022-01-01 RX ADMIN — Medication 325 MILLIGRAM(S): at 00:52

## 2022-01-01 RX ADMIN — Medication 650 MILLIGRAM(S): at 21:44

## 2022-01-01 RX ADMIN — MORPHINE SULFATE 5 MILLIGRAM(S): 50 CAPSULE, EXTENDED RELEASE ORAL at 23:28

## 2022-01-01 RX ADMIN — Medication 325 MILLIGRAM(S): at 10:12

## 2022-01-01 RX ADMIN — Medication 325 MILLIGRAM(S): at 19:31

## 2022-01-01 RX ADMIN — Medication 325 MILLIGRAM(S): at 03:01

## 2022-01-01 RX ADMIN — MORPHINE SULFATE 5 MILLIGRAM(S): 50 CAPSULE, EXTENDED RELEASE ORAL at 08:06

## 2022-01-01 RX ADMIN — SODIUM CHLORIDE 60 MILLILITER(S): 9 INJECTION INTRAMUSCULAR; INTRAVENOUS; SUBCUTANEOUS at 05:21

## 2022-01-01 RX ADMIN — MORPHINE SULFATE 5 MILLIGRAM(S): 50 CAPSULE, EXTENDED RELEASE ORAL at 07:55

## 2022-01-01 RX ADMIN — CHLORHEXIDINE GLUCONATE 1 APPLICATION(S): 213 SOLUTION TOPICAL at 09:18

## 2022-01-01 RX ADMIN — Medication 325 MILLIGRAM(S): at 02:18

## 2022-01-01 RX ADMIN — MORPHINE SULFATE 5 MILLIGRAM(S): 50 CAPSULE, EXTENDED RELEASE ORAL at 09:44

## 2022-01-01 RX ADMIN — Medication 325 MILLIGRAM(S): at 02:41

## 2022-01-01 RX ADMIN — Medication 650 MILLIGRAM(S): at 19:49

## 2022-01-01 RX ADMIN — Medication 325 MILLIGRAM(S): at 06:07

## 2022-01-01 RX ADMIN — Medication 325 MILLIGRAM(S): at 13:34

## 2022-01-01 RX ADMIN — AMPICILLIN SODIUM AND SULBACTAM SODIUM 200 GRAM(S): 250; 125 INJECTION, POWDER, FOR SUSPENSION INTRAMUSCULAR; INTRAVENOUS at 16:34

## 2022-01-01 RX ADMIN — Medication 650 MILLIGRAM(S): at 21:16

## 2022-01-01 RX ADMIN — Medication 325 MILLIGRAM(S): at 17:38

## 2022-01-01 RX ADMIN — SENNA PLUS 2 TABLET(S): 8.6 TABLET ORAL at 21:28

## 2022-01-01 RX ADMIN — Medication 1 MILLIGRAM(S): at 13:50

## 2022-01-01 RX ADMIN — MORPHINE SULFATE 5 MILLIGRAM(S): 50 CAPSULE, EXTENDED RELEASE ORAL at 22:52

## 2022-01-01 RX ADMIN — MORPHINE SULFATE 5 MILLIGRAM(S): 50 CAPSULE, EXTENDED RELEASE ORAL at 09:49

## 2022-01-01 RX ADMIN — CEFEPIME 100 MILLIGRAM(S): 1 INJECTION, POWDER, FOR SOLUTION INTRAMUSCULAR; INTRAVENOUS at 04:00

## 2022-01-01 RX ADMIN — CEFEPIME 100 MILLIGRAM(S): 1 INJECTION, POWDER, FOR SOLUTION INTRAMUSCULAR; INTRAVENOUS at 22:45

## 2022-01-01 RX ADMIN — MORPHINE SULFATE 5 MILLIGRAM(S): 50 CAPSULE, EXTENDED RELEASE ORAL at 15:52

## 2022-01-01 RX ADMIN — Medication 650 MILLIGRAM(S): at 13:50

## 2022-01-01 RX ADMIN — Medication 650 MILLIGRAM(S): at 10:38

## 2022-01-01 RX ADMIN — AMPICILLIN SODIUM AND SULBACTAM SODIUM 200 GRAM(S): 250; 125 INJECTION, POWDER, FOR SUSPENSION INTRAMUSCULAR; INTRAVENOUS at 12:48

## 2022-01-01 RX ADMIN — Medication 650 MILLIGRAM(S): at 17:20

## 2022-01-01 RX ADMIN — Medication 500 MILLIGRAM(S): at 16:59

## 2022-01-01 RX ADMIN — Medication 325 MILLIGRAM(S): at 23:10

## 2022-01-01 RX ADMIN — Medication 650 MILLIGRAM(S): at 14:27

## 2022-01-01 RX ADMIN — Medication 650 MILLIGRAM(S): at 00:53

## 2022-01-01 RX ADMIN — TRAMADOL HYDROCHLORIDE 25 MILLIGRAM(S): 50 TABLET ORAL at 02:03

## 2022-01-01 RX ADMIN — Medication 325 MILLIGRAM(S): at 11:12

## 2022-01-01 RX ADMIN — Medication 325 MILLIGRAM(S): at 20:21

## 2022-01-01 RX ADMIN — MORPHINE SULFATE 5 MILLIGRAM(S): 50 CAPSULE, EXTENDED RELEASE ORAL at 06:37

## 2022-01-01 RX ADMIN — MORPHINE SULFATE 5 MILLIGRAM(S): 50 CAPSULE, EXTENDED RELEASE ORAL at 14:12

## 2022-01-01 RX ADMIN — Medication 650 MILLIGRAM(S): at 15:10

## 2022-01-01 RX ADMIN — CEFEPIME 100 MILLIGRAM(S): 1 INJECTION, POWDER, FOR SOLUTION INTRAMUSCULAR; INTRAVENOUS at 06:24

## 2022-01-01 RX ADMIN — CEFEPIME 100 MILLIGRAM(S): 1 INJECTION, POWDER, FOR SOLUTION INTRAMUSCULAR; INTRAVENOUS at 05:12

## 2022-01-01 RX ADMIN — Medication 650 MILLIGRAM(S): at 06:25

## 2022-01-01 RX ADMIN — Medication 650 MILLIGRAM(S): at 17:50

## 2022-01-01 RX ADMIN — MORPHINE SULFATE 5 MILLIGRAM(S): 50 CAPSULE, EXTENDED RELEASE ORAL at 02:24

## 2022-01-01 RX ADMIN — MORPHINE SULFATE 5 MILLIGRAM(S): 50 CAPSULE, EXTENDED RELEASE ORAL at 22:00

## 2022-01-01 RX ADMIN — Medication 650 MILLIGRAM(S): at 14:29

## 2022-01-01 RX ADMIN — Medication 650 MILLIGRAM(S): at 18:40

## 2022-01-01 RX ADMIN — MORPHINE SULFATE 5 MILLIGRAM(S): 50 CAPSULE, EXTENDED RELEASE ORAL at 12:53

## 2022-01-01 RX ADMIN — CEFEPIME 100 MILLIGRAM(S): 1 INJECTION, POWDER, FOR SOLUTION INTRAMUSCULAR; INTRAVENOUS at 21:20

## 2022-01-01 RX ADMIN — MORPHINE SULFATE 5 MILLIGRAM(S): 50 CAPSULE, EXTENDED RELEASE ORAL at 03:53

## 2022-01-01 RX ADMIN — Medication 325 MILLIGRAM(S): at 19:58

## 2022-01-01 RX ADMIN — MORPHINE SULFATE 5 MILLIGRAM(S): 50 CAPSULE, EXTENDED RELEASE ORAL at 16:07

## 2022-01-01 RX ADMIN — MORPHINE SULFATE 5 MILLIGRAM(S): 50 CAPSULE, EXTENDED RELEASE ORAL at 12:48

## 2022-01-01 RX ADMIN — SODIUM CHLORIDE 60 MILLILITER(S): 9 INJECTION INTRAMUSCULAR; INTRAVENOUS; SUBCUTANEOUS at 02:36

## 2022-01-01 RX ADMIN — MORPHINE SULFATE 5 MILLIGRAM(S): 50 CAPSULE, EXTENDED RELEASE ORAL at 15:02

## 2022-01-01 RX ADMIN — MORPHINE SULFATE 5 MILLIGRAM(S): 50 CAPSULE, EXTENDED RELEASE ORAL at 10:56

## 2022-01-01 RX ADMIN — CEFEPIME 100 MILLIGRAM(S): 1 INJECTION, POWDER, FOR SOLUTION INTRAMUSCULAR; INTRAVENOUS at 14:07

## 2022-01-01 RX ADMIN — Medication 650 MILLIGRAM(S): at 05:23

## 2022-01-01 RX ADMIN — Medication 325 MILLIGRAM(S): at 12:47

## 2022-01-01 RX ADMIN — CEFTRIAXONE 100 MILLIGRAM(S): 500 INJECTION, POWDER, FOR SOLUTION INTRAMUSCULAR; INTRAVENOUS at 21:59

## 2022-01-01 RX ADMIN — Medication 650 MILLIGRAM(S): at 23:41

## 2022-01-01 RX ADMIN — MORPHINE SULFATE 5 MILLIGRAM(S): 50 CAPSULE, EXTENDED RELEASE ORAL at 05:23

## 2022-01-01 RX ADMIN — Medication 650 MILLIGRAM(S): at 06:44

## 2022-01-01 RX ADMIN — MORPHINE SULFATE 5 MILLIGRAM(S): 50 CAPSULE, EXTENDED RELEASE ORAL at 18:15

## 2022-01-01 RX ADMIN — CEFEPIME 100 MILLIGRAM(S): 1 INJECTION, POWDER, FOR SOLUTION INTRAMUSCULAR; INTRAVENOUS at 23:06

## 2022-01-01 RX ADMIN — Medication 1 MILLIGRAM(S): at 11:57

## 2022-01-01 RX ADMIN — MORPHINE SULFATE 5 MILLIGRAM(S): 50 CAPSULE, EXTENDED RELEASE ORAL at 19:27

## 2022-01-01 RX ADMIN — Medication 1000 UNIT(S): at 12:30

## 2022-01-01 RX ADMIN — MORPHINE SULFATE 5 MILLIGRAM(S): 50 CAPSULE, EXTENDED RELEASE ORAL at 02:28

## 2022-01-01 RX ADMIN — MORPHINE SULFATE 5 MILLIGRAM(S): 50 CAPSULE, EXTENDED RELEASE ORAL at 07:21

## 2022-01-01 RX ADMIN — Medication 500 MILLIGRAM(S): at 02:37

## 2022-01-01 RX ADMIN — Medication 650 MILLIGRAM(S): at 07:32

## 2022-01-01 RX ADMIN — MORPHINE SULFATE 5 MILLIGRAM(S): 50 CAPSULE, EXTENDED RELEASE ORAL at 21:08

## 2022-01-01 RX ADMIN — Medication 325 MILLIGRAM(S): at 06:50

## 2022-01-01 RX ADMIN — MORPHINE SULFATE 5 MILLIGRAM(S): 50 CAPSULE, EXTENDED RELEASE ORAL at 19:09

## 2022-01-01 RX ADMIN — Medication 650 MILLIGRAM(S): at 07:45

## 2022-01-01 RX ADMIN — PANTOPRAZOLE SODIUM 40 MILLIGRAM(S): 20 TABLET, DELAYED RELEASE ORAL at 17:23

## 2022-01-01 RX ADMIN — Medication 650 MILLIGRAM(S): at 14:23

## 2022-01-01 RX ADMIN — Medication 650 MILLIGRAM(S): at 11:39

## 2022-01-01 RX ADMIN — MORPHINE SULFATE 5 MILLIGRAM(S): 50 CAPSULE, EXTENDED RELEASE ORAL at 22:03

## 2022-01-01 RX ADMIN — MORPHINE SULFATE 5 MILLIGRAM(S): 50 CAPSULE, EXTENDED RELEASE ORAL at 00:16

## 2022-01-01 RX ADMIN — MORPHINE SULFATE 5 MILLIGRAM(S): 50 CAPSULE, EXTENDED RELEASE ORAL at 13:08

## 2022-01-01 RX ADMIN — Medication 650 MILLIGRAM(S): at 17:35

## 2022-01-01 RX ADMIN — Medication 250 MILLIGRAM(S): at 17:16

## 2022-01-01 RX ADMIN — Medication 650 MILLIGRAM(S): at 10:06

## 2022-01-01 RX ADMIN — Medication 325 MILLIGRAM(S): at 13:11

## 2022-01-01 RX ADMIN — Medication 325 MILLIGRAM(S): at 22:20

## 2022-01-01 RX ADMIN — Medication 325 MILLIGRAM(S): at 17:45

## 2022-01-01 RX ADMIN — Medication 650 MILLIGRAM(S): at 21:11

## 2022-01-01 RX ADMIN — MORPHINE SULFATE 5 MILLIGRAM(S): 50 CAPSULE, EXTENDED RELEASE ORAL at 14:10

## 2022-01-01 RX ADMIN — Medication 650 MILLIGRAM(S): at 17:28

## 2022-01-01 RX ADMIN — Medication 325 MILLIGRAM(S): at 03:25

## 2022-01-01 RX ADMIN — Medication 650 MILLIGRAM(S): at 08:01

## 2022-01-01 RX ADMIN — TRAMADOL HYDROCHLORIDE 25 MILLIGRAM(S): 50 TABLET ORAL at 06:37

## 2022-01-01 RX ADMIN — MORPHINE SULFATE 5 MILLIGRAM(S): 50 CAPSULE, EXTENDED RELEASE ORAL at 03:37

## 2022-01-01 RX ADMIN — MORPHINE SULFATE 5 MILLIGRAM(S): 50 CAPSULE, EXTENDED RELEASE ORAL at 14:20

## 2022-01-01 RX ADMIN — CHLORHEXIDINE GLUCONATE 1 APPLICATION(S): 213 SOLUTION TOPICAL at 13:48

## 2022-01-01 RX ADMIN — MORPHINE SULFATE 5 MILLIGRAM(S): 50 CAPSULE, EXTENDED RELEASE ORAL at 21:02

## 2022-01-01 RX ADMIN — MORPHINE SULFATE 5 MILLIGRAM(S): 50 CAPSULE, EXTENDED RELEASE ORAL at 10:57

## 2022-01-01 RX ADMIN — CEFEPIME 100 MILLIGRAM(S): 1 INJECTION, POWDER, FOR SOLUTION INTRAMUSCULAR; INTRAVENOUS at 16:19

## 2022-01-01 RX ADMIN — AMPICILLIN SODIUM AND SULBACTAM SODIUM 200 GRAM(S): 250; 125 INJECTION, POWDER, FOR SUSPENSION INTRAMUSCULAR; INTRAVENOUS at 04:27

## 2022-01-01 RX ADMIN — MORPHINE SULFATE 5 MILLIGRAM(S): 50 CAPSULE, EXTENDED RELEASE ORAL at 01:57

## 2022-01-01 RX ADMIN — Medication 5 MILLIGRAM(S): at 23:23

## 2022-01-01 RX ADMIN — Medication 325 MILLIGRAM(S): at 05:42

## 2022-01-01 RX ADMIN — CEFEPIME 100 MILLIGRAM(S): 1 INJECTION, POWDER, FOR SOLUTION INTRAMUSCULAR; INTRAVENOUS at 15:39

## 2022-01-01 RX ADMIN — MORPHINE SULFATE 5 MILLIGRAM(S): 50 CAPSULE, EXTENDED RELEASE ORAL at 13:09

## 2022-01-01 RX ADMIN — MORPHINE SULFATE 5 MILLIGRAM(S): 50 CAPSULE, EXTENDED RELEASE ORAL at 04:05

## 2022-01-01 RX ADMIN — TRAMADOL HYDROCHLORIDE 25 MILLIGRAM(S): 50 TABLET ORAL at 22:30

## 2022-01-01 RX ADMIN — SODIUM CHLORIDE 70 MILLILITER(S): 9 INJECTION, SOLUTION INTRAVENOUS at 09:54

## 2022-01-01 RX ADMIN — CEFEPIME 100 MILLIGRAM(S): 1 INJECTION, POWDER, FOR SOLUTION INTRAMUSCULAR; INTRAVENOUS at 22:35

## 2022-01-01 RX ADMIN — Medication 650 MILLIGRAM(S): at 20:59

## 2022-01-01 RX ADMIN — Medication 325 MILLIGRAM(S): at 04:17

## 2022-01-01 RX ADMIN — MORPHINE SULFATE 5 MILLIGRAM(S): 50 CAPSULE, EXTENDED RELEASE ORAL at 07:05

## 2022-01-01 RX ADMIN — Medication 1 MILLIGRAM(S): at 11:51

## 2022-01-01 RX ADMIN — Medication 325 MILLIGRAM(S): at 22:05

## 2022-01-01 RX ADMIN — CEFEPIME 100 MILLIGRAM(S): 1 INJECTION, POWDER, FOR SOLUTION INTRAMUSCULAR; INTRAVENOUS at 05:58

## 2022-01-01 RX ADMIN — Medication 650 MILLIGRAM(S): at 13:00

## 2022-01-01 RX ADMIN — Medication 650 MILLIGRAM(S): at 17:29

## 2022-01-01 RX ADMIN — CEFEPIME 100 MILLIGRAM(S): 1 INJECTION, POWDER, FOR SOLUTION INTRAMUSCULAR; INTRAVENOUS at 15:15

## 2022-01-01 RX ADMIN — MORPHINE SULFATE 1 MILLIGRAM(S): 50 CAPSULE, EXTENDED RELEASE ORAL at 12:01

## 2022-01-01 RX ADMIN — Medication 325 MILLIGRAM(S): at 05:57

## 2022-01-01 RX ADMIN — MORPHINE SULFATE 5 MILLIGRAM(S): 50 CAPSULE, EXTENDED RELEASE ORAL at 12:36

## 2022-01-01 RX ADMIN — MORPHINE SULFATE 5 MILLIGRAM(S): 50 CAPSULE, EXTENDED RELEASE ORAL at 18:50

## 2022-01-01 RX ADMIN — MORPHINE SULFATE 7.5 MILLIGRAM(S): 50 CAPSULE, EXTENDED RELEASE ORAL at 23:33

## 2022-01-01 RX ADMIN — MORPHINE SULFATE 5 MILLIGRAM(S): 50 CAPSULE, EXTENDED RELEASE ORAL at 14:26

## 2022-01-01 RX ADMIN — MORPHINE SULFATE 5 MILLIGRAM(S): 50 CAPSULE, EXTENDED RELEASE ORAL at 00:29

## 2022-01-01 RX ADMIN — Medication 650 MILLIGRAM(S): at 18:15

## 2022-01-01 RX ADMIN — Medication 325 MILLIGRAM(S): at 12:56

## 2022-01-01 RX ADMIN — Medication 650 MILLIGRAM(S): at 13:06

## 2022-01-01 RX ADMIN — AMPICILLIN SODIUM AND SULBACTAM SODIUM 200 GRAM(S): 250; 125 INJECTION, POWDER, FOR SUSPENSION INTRAMUSCULAR; INTRAVENOUS at 06:44

## 2022-01-01 RX ADMIN — MORPHINE SULFATE 5 MILLIGRAM(S): 50 CAPSULE, EXTENDED RELEASE ORAL at 12:06

## 2022-01-01 RX ADMIN — Medication 325 MILLIGRAM(S): at 17:43

## 2022-01-01 RX ADMIN — CHLORHEXIDINE GLUCONATE 1 APPLICATION(S): 213 SOLUTION TOPICAL at 09:29

## 2022-01-01 RX ADMIN — Medication 325 MILLIGRAM(S): at 03:17

## 2022-01-01 RX ADMIN — TRAMADOL HYDROCHLORIDE 50 MILLIGRAM(S): 50 TABLET ORAL at 09:01

## 2022-01-01 RX ADMIN — Medication 650 MILLIGRAM(S): at 21:26

## 2022-01-01 RX ADMIN — SENNA PLUS 2 TABLET(S): 8.6 TABLET ORAL at 23:12

## 2022-01-01 RX ADMIN — TRAMADOL HYDROCHLORIDE 25 MILLIGRAM(S): 50 TABLET ORAL at 13:45

## 2022-01-01 RX ADMIN — Medication 325 MILLIGRAM(S): at 11:52

## 2022-01-01 RX ADMIN — MORPHINE SULFATE 5 MILLIGRAM(S): 50 CAPSULE, EXTENDED RELEASE ORAL at 06:39

## 2022-01-01 RX ADMIN — Medication 650 MILLIGRAM(S): at 01:33

## 2022-01-01 RX ADMIN — Medication 650 MILLIGRAM(S): at 12:30

## 2022-01-01 RX ADMIN — Medication 1 MILLIGRAM(S): at 11:06

## 2022-01-01 RX ADMIN — CHLORHEXIDINE GLUCONATE 1 APPLICATION(S): 213 SOLUTION TOPICAL at 11:47

## 2022-01-01 RX ADMIN — LIDOCAINE 1 PATCH: 4 CREAM TOPICAL at 21:39

## 2022-01-01 RX ADMIN — Medication 325 MILLIGRAM(S): at 07:03

## 2022-01-01 RX ADMIN — Medication 325 MILLIGRAM(S): at 09:18

## 2022-01-01 RX ADMIN — Medication 1 MILLIGRAM(S): at 12:52

## 2022-01-01 RX ADMIN — MORPHINE SULFATE 5 MILLIGRAM(S): 50 CAPSULE, EXTENDED RELEASE ORAL at 19:05

## 2022-01-01 RX ADMIN — Medication 325 MILLIGRAM(S): at 18:15

## 2022-01-01 RX ADMIN — TRAMADOL HYDROCHLORIDE 25 MILLIGRAM(S): 50 TABLET ORAL at 00:00

## 2022-01-01 RX ADMIN — MORPHINE SULFATE 5 MILLIGRAM(S): 50 CAPSULE, EXTENDED RELEASE ORAL at 00:10

## 2022-01-01 RX ADMIN — MORPHINE SULFATE 5 MILLIGRAM(S): 50 CAPSULE, EXTENDED RELEASE ORAL at 19:25

## 2022-01-01 RX ADMIN — MORPHINE SULFATE 5 MILLIGRAM(S): 50 CAPSULE, EXTENDED RELEASE ORAL at 21:30

## 2022-01-01 RX ADMIN — TRAMADOL HYDROCHLORIDE 25 MILLIGRAM(S): 50 TABLET ORAL at 03:38

## 2022-01-01 RX ADMIN — CEFEPIME 100 MILLIGRAM(S): 1 INJECTION, POWDER, FOR SOLUTION INTRAMUSCULAR; INTRAVENOUS at 16:17

## 2022-01-01 RX ADMIN — MORPHINE SULFATE 5 MILLIGRAM(S): 50 CAPSULE, EXTENDED RELEASE ORAL at 11:14

## 2022-01-01 RX ADMIN — CEFEPIME 100 MILLIGRAM(S): 1 INJECTION, POWDER, FOR SOLUTION INTRAMUSCULAR; INTRAVENOUS at 16:13

## 2022-01-01 RX ADMIN — Medication 250 MILLIGRAM(S): at 08:45

## 2022-01-01 RX ADMIN — MORPHINE SULFATE 5 MILLIGRAM(S): 50 CAPSULE, EXTENDED RELEASE ORAL at 20:00

## 2022-01-01 RX ADMIN — MORPHINE SULFATE 5 MILLIGRAM(S): 50 CAPSULE, EXTENDED RELEASE ORAL at 07:51

## 2022-01-01 RX ADMIN — Medication 325 MILLIGRAM(S): at 05:38

## 2022-01-01 RX ADMIN — Medication 650 MILLIGRAM(S): at 16:15

## 2022-01-01 RX ADMIN — LINEZOLID 300 MILLIGRAM(S): 600 INJECTION, SOLUTION INTRAVENOUS at 18:10

## 2022-01-01 RX ADMIN — Medication 650 MILLIGRAM(S): at 18:22

## 2022-01-01 RX ADMIN — Medication 325 MILLIGRAM(S): at 15:19

## 2022-01-01 RX ADMIN — MORPHINE SULFATE 5 MILLIGRAM(S): 50 CAPSULE, EXTENDED RELEASE ORAL at 06:34

## 2022-01-01 RX ADMIN — MORPHINE SULFATE 5 MILLIGRAM(S): 50 CAPSULE, EXTENDED RELEASE ORAL at 09:39

## 2022-01-01 RX ADMIN — MORPHINE SULFATE 5 MILLIGRAM(S): 50 CAPSULE, EXTENDED RELEASE ORAL at 00:51

## 2022-01-01 RX ADMIN — Medication 650 MILLIGRAM(S): at 05:05

## 2022-01-01 RX ADMIN — Medication 650 MILLIGRAM(S): at 11:24

## 2022-01-01 RX ADMIN — MORPHINE SULFATE 5 MILLIGRAM(S): 50 CAPSULE, EXTENDED RELEASE ORAL at 15:33

## 2022-01-01 RX ADMIN — Medication 650 MILLIGRAM(S): at 01:19

## 2022-01-01 RX ADMIN — Medication 325 MILLIGRAM(S): at 12:30

## 2022-01-01 RX ADMIN — MORPHINE SULFATE 5 MILLIGRAM(S): 50 CAPSULE, EXTENDED RELEASE ORAL at 18:17

## 2022-01-01 RX ADMIN — SODIUM CHLORIDE 1000 MILLILITER(S): 9 INJECTION INTRAMUSCULAR; INTRAVENOUS; SUBCUTANEOUS at 05:12

## 2022-01-01 RX ADMIN — MORPHINE SULFATE 5 MILLIGRAM(S): 50 CAPSULE, EXTENDED RELEASE ORAL at 23:49

## 2022-01-01 RX ADMIN — MORPHINE SULFATE 5 MILLIGRAM(S): 50 CAPSULE, EXTENDED RELEASE ORAL at 06:54

## 2022-01-01 RX ADMIN — Medication 650 MILLIGRAM(S): at 18:21

## 2022-01-01 RX ADMIN — LIDOCAINE 1 PATCH: 4 CREAM TOPICAL at 13:56

## 2022-01-01 RX ADMIN — SODIUM CHLORIDE 100 MILLILITER(S): 9 INJECTION INTRAMUSCULAR; INTRAVENOUS; SUBCUTANEOUS at 12:19

## 2022-01-01 RX ADMIN — Medication 325 MILLIGRAM(S): at 13:15

## 2022-01-01 RX ADMIN — Medication 325 MILLIGRAM(S): at 11:51

## 2022-01-01 RX ADMIN — Medication 325 MILLIGRAM(S): at 00:32

## 2022-01-01 RX ADMIN — Medication 325 MILLIGRAM(S): at 13:50

## 2022-01-01 RX ADMIN — SIMETHICONE 80 MILLIGRAM(S): 80 TABLET, CHEWABLE ORAL at 17:41

## 2022-01-01 RX ADMIN — MORPHINE SULFATE 5 MILLIGRAM(S): 50 CAPSULE, EXTENDED RELEASE ORAL at 20:54

## 2022-01-01 RX ADMIN — MORPHINE SULFATE 5 MILLIGRAM(S): 50 CAPSULE, EXTENDED RELEASE ORAL at 05:31

## 2022-01-01 RX ADMIN — AMPICILLIN SODIUM AND SULBACTAM SODIUM 200 GRAM(S): 250; 125 INJECTION, POWDER, FOR SUSPENSION INTRAMUSCULAR; INTRAVENOUS at 06:07

## 2022-01-01 RX ADMIN — Medication 250 MILLIGRAM(S): at 17:44

## 2022-01-01 RX ADMIN — MORPHINE SULFATE 2 MILLIGRAM(S): 50 CAPSULE, EXTENDED RELEASE ORAL at 22:40

## 2022-01-01 RX ADMIN — Medication 650 MILLIGRAM(S): at 08:26

## 2022-01-01 RX ADMIN — Medication 650 MILLIGRAM(S): at 05:57

## 2022-01-01 RX ADMIN — BUDESONIDE AND FORMOTEROL FUMARATE DIHYDRATE 2 PUFF(S): 160; 4.5 AEROSOL RESPIRATORY (INHALATION) at 11:06

## 2022-01-01 RX ADMIN — CEFEPIME 100 MILLIGRAM(S): 1 INJECTION, POWDER, FOR SOLUTION INTRAMUSCULAR; INTRAVENOUS at 05:00

## 2022-01-01 RX ADMIN — Medication 650 MILLIGRAM(S): at 00:57

## 2022-01-01 RX ADMIN — Medication 650 MILLIGRAM(S): at 00:32

## 2022-01-01 RX ADMIN — MORPHINE SULFATE 5 MILLIGRAM(S): 50 CAPSULE, EXTENDED RELEASE ORAL at 06:05

## 2022-01-01 RX ADMIN — MORPHINE SULFATE 5 MILLIGRAM(S): 50 CAPSULE, EXTENDED RELEASE ORAL at 14:13

## 2022-01-01 RX ADMIN — Medication 250 MILLIGRAM(S): at 10:50

## 2022-01-01 RX ADMIN — SODIUM CHLORIDE 2400 MILLILITER(S): 9 INJECTION INTRAMUSCULAR; INTRAVENOUS; SUBCUTANEOUS at 20:26

## 2022-01-01 RX ADMIN — Medication 1 MILLIGRAM(S): at 12:07

## 2022-01-01 RX ADMIN — MORPHINE SULFATE 5 MILLIGRAM(S): 50 CAPSULE, EXTENDED RELEASE ORAL at 04:04

## 2022-01-01 RX ADMIN — ENOXAPARIN SODIUM 40 MILLIGRAM(S): 100 INJECTION SUBCUTANEOUS at 05:13

## 2022-01-01 RX ADMIN — Medication 1 MILLIGRAM(S): at 12:16

## 2022-01-01 RX ADMIN — Medication 250 MILLIGRAM(S): at 21:28

## 2022-01-01 RX ADMIN — SENNA PLUS 2 TABLET(S): 8.6 TABLET ORAL at 21:40

## 2022-01-01 RX ADMIN — MORPHINE SULFATE 5 MILLIGRAM(S): 50 CAPSULE, EXTENDED RELEASE ORAL at 06:18

## 2022-01-01 RX ADMIN — CEFEPIME 100 MILLIGRAM(S): 1 INJECTION, POWDER, FOR SOLUTION INTRAMUSCULAR; INTRAVENOUS at 05:39

## 2022-01-01 RX ADMIN — TRAMADOL HYDROCHLORIDE 25 MILLIGRAM(S): 50 TABLET ORAL at 21:22

## 2022-01-01 RX ADMIN — Medication 325 MILLIGRAM(S): at 15:24

## 2022-01-01 RX ADMIN — MORPHINE SULFATE 5 MILLIGRAM(S): 50 CAPSULE, EXTENDED RELEASE ORAL at 22:41

## 2022-01-01 RX ADMIN — MORPHINE SULFATE 5 MILLIGRAM(S): 50 CAPSULE, EXTENDED RELEASE ORAL at 02:38

## 2022-01-01 RX ADMIN — CHLORHEXIDINE GLUCONATE 1 APPLICATION(S): 213 SOLUTION TOPICAL at 12:12

## 2022-01-01 RX ADMIN — Medication 325 MILLIGRAM(S): at 12:45

## 2022-01-01 RX ADMIN — MORPHINE SULFATE 5 MILLIGRAM(S): 50 CAPSULE, EXTENDED RELEASE ORAL at 01:15

## 2022-01-01 RX ADMIN — Medication 325 MILLIGRAM(S): at 18:30

## 2022-01-01 RX ADMIN — Medication 650 MILLIGRAM(S): at 18:41

## 2022-01-01 RX ADMIN — Medication 325 MILLIGRAM(S): at 00:20

## 2022-01-01 RX ADMIN — MORPHINE SULFATE 5 MILLIGRAM(S): 50 CAPSULE, EXTENDED RELEASE ORAL at 14:01

## 2022-01-01 RX ADMIN — MORPHINE SULFATE 5 MILLIGRAM(S): 50 CAPSULE, EXTENDED RELEASE ORAL at 19:48

## 2022-01-01 RX ADMIN — MORPHINE SULFATE 2 MILLIGRAM(S): 50 CAPSULE, EXTENDED RELEASE ORAL at 22:25

## 2022-01-01 RX ADMIN — Medication 250 MILLIGRAM(S): at 02:32

## 2022-01-01 RX ADMIN — Medication 650 MILLIGRAM(S): at 18:48

## 2022-01-01 RX ADMIN — Medication 325 MILLIGRAM(S): at 06:08

## 2022-01-01 RX ADMIN — MORPHINE SULFATE 5 MILLIGRAM(S): 50 CAPSULE, EXTENDED RELEASE ORAL at 15:26

## 2022-01-01 RX ADMIN — Medication 650 MILLIGRAM(S): at 05:47

## 2022-01-01 RX ADMIN — PANTOPRAZOLE SODIUM 40 MILLIGRAM(S): 20 TABLET, DELAYED RELEASE ORAL at 17:44

## 2022-01-01 RX ADMIN — MORPHINE SULFATE 5 MILLIGRAM(S): 50 CAPSULE, EXTENDED RELEASE ORAL at 10:47

## 2022-01-01 RX ADMIN — Medication 650 MILLIGRAM(S): at 15:24

## 2022-01-01 RX ADMIN — Medication 650 MILLIGRAM(S): at 13:52

## 2022-01-01 RX ADMIN — Medication 325 MILLIGRAM(S): at 23:16

## 2022-01-01 RX ADMIN — Medication 325 MILLIGRAM(S): at 05:35

## 2022-01-01 RX ADMIN — Medication 1 MILLIGRAM(S): at 12:53

## 2022-01-01 RX ADMIN — MORPHINE SULFATE 5 MILLIGRAM(S): 50 CAPSULE, EXTENDED RELEASE ORAL at 16:40

## 2022-01-01 RX ADMIN — Medication 650 MILLIGRAM(S): at 12:51

## 2022-01-01 RX ADMIN — MORPHINE SULFATE 5 MILLIGRAM(S): 50 CAPSULE, EXTENDED RELEASE ORAL at 14:07

## 2022-01-01 RX ADMIN — Medication 1000 UNIT(S): at 11:43

## 2022-01-01 RX ADMIN — Medication 325 MILLIGRAM(S): at 16:12

## 2022-01-01 RX ADMIN — MORPHINE SULFATE 5 MILLIGRAM(S): 50 CAPSULE, EXTENDED RELEASE ORAL at 23:01

## 2022-01-01 RX ADMIN — Medication 650 MILLIGRAM(S): at 15:52

## 2022-01-01 RX ADMIN — Medication 650 MILLIGRAM(S): at 04:13

## 2022-01-01 RX ADMIN — CHLORHEXIDINE GLUCONATE 1 APPLICATION(S): 213 SOLUTION TOPICAL at 11:51

## 2022-01-01 RX ADMIN — Medication 325 MILLIGRAM(S): at 13:05

## 2022-01-01 RX ADMIN — Medication 650 MILLIGRAM(S): at 09:45

## 2022-01-01 RX ADMIN — MORPHINE SULFATE 5 MILLIGRAM(S): 50 CAPSULE, EXTENDED RELEASE ORAL at 05:42

## 2022-01-01 RX ADMIN — Medication 325 MILLIGRAM(S): at 14:44

## 2022-01-01 RX ADMIN — Medication 650 MILLIGRAM(S): at 02:05

## 2022-01-01 RX ADMIN — Medication 325 MILLIGRAM(S): at 06:12

## 2022-01-01 RX ADMIN — Medication 650 MILLIGRAM(S): at 05:07

## 2022-01-01 RX ADMIN — AMPICILLIN SODIUM AND SULBACTAM SODIUM 200 GRAM(S): 250; 125 INJECTION, POWDER, FOR SUSPENSION INTRAMUSCULAR; INTRAVENOUS at 00:49

## 2022-01-01 RX ADMIN — MORPHINE SULFATE 5 MILLIGRAM(S): 50 CAPSULE, EXTENDED RELEASE ORAL at 05:30

## 2022-01-01 RX ADMIN — Medication 650 MILLIGRAM(S): at 15:11

## 2022-01-01 RX ADMIN — PANTOPRAZOLE SODIUM 40 MILLIGRAM(S): 20 TABLET, DELAYED RELEASE ORAL at 17:10

## 2022-01-01 RX ADMIN — Medication 650 MILLIGRAM(S): at 00:58

## 2022-01-01 RX ADMIN — Medication 650 MILLIGRAM(S): at 09:23

## 2022-01-01 RX ADMIN — TRAMADOL HYDROCHLORIDE 25 MILLIGRAM(S): 50 TABLET ORAL at 12:46

## 2022-01-01 RX ADMIN — Medication 650 MILLIGRAM(S): at 07:44

## 2022-01-01 RX ADMIN — Medication 650 MILLIGRAM(S): at 11:40

## 2022-01-01 RX ADMIN — MORPHINE SULFATE 5 MILLIGRAM(S): 50 CAPSULE, EXTENDED RELEASE ORAL at 14:32

## 2022-01-01 RX ADMIN — Medication 1 MILLIGRAM(S): at 11:08

## 2022-01-01 RX ADMIN — Medication 325 MILLIGRAM(S): at 10:39

## 2022-01-01 RX ADMIN — Medication 325 MILLIGRAM(S): at 14:07

## 2022-01-01 RX ADMIN — TRAMADOL HYDROCHLORIDE 50 MILLIGRAM(S): 50 TABLET ORAL at 22:41

## 2022-01-01 RX ADMIN — MORPHINE SULFATE 5 MILLIGRAM(S): 50 CAPSULE, EXTENDED RELEASE ORAL at 18:55

## 2022-01-01 RX ADMIN — Medication 650 MILLIGRAM(S): at 03:33

## 2022-01-01 RX ADMIN — MORPHINE SULFATE 5 MILLIGRAM(S): 50 CAPSULE, EXTENDED RELEASE ORAL at 08:31

## 2022-01-01 RX ADMIN — MORPHINE SULFATE 5 MILLIGRAM(S): 50 CAPSULE, EXTENDED RELEASE ORAL at 07:29

## 2022-01-01 RX ADMIN — Medication 325 MILLIGRAM(S): at 06:14

## 2022-01-01 RX ADMIN — TRAMADOL HYDROCHLORIDE 25 MILLIGRAM(S): 50 TABLET ORAL at 14:12

## 2022-01-01 RX ADMIN — CEFTRIAXONE 100 MILLIGRAM(S): 500 INJECTION, POWDER, FOR SOLUTION INTRAMUSCULAR; INTRAVENOUS at 21:09

## 2022-01-01 RX ADMIN — TRAMADOL HYDROCHLORIDE 25 MILLIGRAM(S): 50 TABLET ORAL at 17:14

## 2022-01-01 RX ADMIN — Medication 650 MILLIGRAM(S): at 10:22

## 2022-01-01 RX ADMIN — Medication 650 MILLIGRAM(S): at 14:34

## 2022-01-01 RX ADMIN — Medication 325 MILLIGRAM(S): at 19:52

## 2022-01-01 RX ADMIN — GLUCAGON INJECTION, SOLUTION 1 MILLIGRAM(S): 0.5 INJECTION, SOLUTION SUBCUTANEOUS at 03:09

## 2022-01-01 RX ADMIN — MORPHINE SULFATE 5 MILLIGRAM(S): 50 CAPSULE, EXTENDED RELEASE ORAL at 07:08

## 2022-01-01 RX ADMIN — CHLORHEXIDINE GLUCONATE 1 APPLICATION(S): 213 SOLUTION TOPICAL at 11:36

## 2022-01-01 RX ADMIN — Medication 25 MILLIGRAM(S): at 18:34

## 2022-01-01 RX ADMIN — CEFEPIME 100 MILLIGRAM(S): 1 INJECTION, POWDER, FOR SOLUTION INTRAMUSCULAR; INTRAVENOUS at 13:24

## 2022-01-01 RX ADMIN — Medication 325 MILLIGRAM(S): at 00:10

## 2022-01-01 RX ADMIN — Medication 650 MILLIGRAM(S): at 09:52

## 2022-01-01 RX ADMIN — MORPHINE SULFATE 5 MILLIGRAM(S): 50 CAPSULE, EXTENDED RELEASE ORAL at 07:16

## 2022-01-01 RX ADMIN — AMPICILLIN SODIUM AND SULBACTAM SODIUM 200 GRAM(S): 250; 125 INJECTION, POWDER, FOR SUSPENSION INTRAMUSCULAR; INTRAVENOUS at 15:35

## 2022-01-01 RX ADMIN — Medication 650 MILLIGRAM(S): at 03:55

## 2022-01-01 RX ADMIN — Medication 325 MILLIGRAM(S): at 22:10

## 2022-01-01 RX ADMIN — MORPHINE SULFATE 5 MILLIGRAM(S): 50 CAPSULE, EXTENDED RELEASE ORAL at 16:26

## 2022-01-01 RX ADMIN — Medication 1000 UNIT(S): at 13:10

## 2022-01-01 RX ADMIN — SENNA PLUS 2 TABLET(S): 8.6 TABLET ORAL at 21:20

## 2022-01-01 RX ADMIN — AMPICILLIN SODIUM AND SULBACTAM SODIUM 200 GRAM(S): 250; 125 INJECTION, POWDER, FOR SUSPENSION INTRAMUSCULAR; INTRAVENOUS at 22:51

## 2022-01-01 RX ADMIN — TRAMADOL HYDROCHLORIDE 25 MILLIGRAM(S): 50 TABLET ORAL at 06:10

## 2022-01-01 RX ADMIN — TRAMADOL HYDROCHLORIDE 25 MILLIGRAM(S): 50 TABLET ORAL at 17:44

## 2022-01-01 RX ADMIN — SODIUM CHLORIDE 60 MILLILITER(S): 9 INJECTION, SOLUTION INTRAVENOUS at 08:19

## 2022-01-01 RX ADMIN — Medication 650 MILLIGRAM(S): at 11:42

## 2022-01-01 RX ADMIN — MORPHINE SULFATE 5 MILLIGRAM(S): 50 CAPSULE, EXTENDED RELEASE ORAL at 07:00

## 2022-01-01 RX ADMIN — Medication 650 MILLIGRAM(S): at 21:45

## 2022-01-01 RX ADMIN — SENNA PLUS 2 TABLET(S): 8.6 TABLET ORAL at 22:10

## 2022-01-01 RX ADMIN — Medication 650 MILLIGRAM(S): at 12:04

## 2022-01-01 RX ADMIN — CEFTRIAXONE 100 MILLIGRAM(S): 500 INJECTION, POWDER, FOR SOLUTION INTRAMUSCULAR; INTRAVENOUS at 17:08

## 2022-01-01 RX ADMIN — MORPHINE SULFATE 5 MILLIGRAM(S): 50 CAPSULE, EXTENDED RELEASE ORAL at 05:13

## 2022-01-01 RX ADMIN — Medication 1 MILLIGRAM(S): at 13:35

## 2022-01-01 RX ADMIN — SODIUM CHLORIDE 100 MILLILITER(S): 9 INJECTION INTRAMUSCULAR; INTRAVENOUS; SUBCUTANEOUS at 07:09

## 2022-01-01 RX ADMIN — MORPHINE SULFATE 5 MILLIGRAM(S): 50 CAPSULE, EXTENDED RELEASE ORAL at 09:10

## 2022-01-01 RX ADMIN — MORPHINE SULFATE 5 MILLIGRAM(S): 50 CAPSULE, EXTENDED RELEASE ORAL at 13:39

## 2022-01-01 RX ADMIN — Medication 650 MILLIGRAM(S): at 03:51

## 2022-01-01 RX ADMIN — MORPHINE SULFATE 2 MILLIGRAM(S): 50 CAPSULE, EXTENDED RELEASE ORAL at 22:57

## 2022-01-01 RX ADMIN — MORPHINE SULFATE 5 MILLIGRAM(S): 50 CAPSULE, EXTENDED RELEASE ORAL at 09:55

## 2022-01-01 RX ADMIN — Medication 650 MILLIGRAM(S): at 02:40

## 2022-01-01 RX ADMIN — Medication 325 MILLIGRAM(S): at 05:36

## 2022-01-01 RX ADMIN — Medication 650 MILLIGRAM(S): at 12:27

## 2022-01-01 RX ADMIN — Medication 650 MILLIGRAM(S): at 06:11

## 2022-01-01 RX ADMIN — CEFEPIME 100 MILLIGRAM(S): 1 INJECTION, POWDER, FOR SOLUTION INTRAMUSCULAR; INTRAVENOUS at 03:35

## 2022-01-01 RX ADMIN — Medication 325 MILLIGRAM(S): at 18:18

## 2022-01-01 RX ADMIN — MORPHINE SULFATE 5 MILLIGRAM(S): 50 CAPSULE, EXTENDED RELEASE ORAL at 02:31

## 2022-01-01 RX ADMIN — Medication 325 MILLIGRAM(S): at 23:41

## 2022-01-01 NOTE — ED ADULT NURSE NOTE - CHPI ED NUR DURATION
week(s)/3 Right ear hearing screen completed date: 2022  Right ear screen method: EOAE (evoked otoacoustic emission)  Right ear screen result: Passed  Right ear screen comment: N/A    Left ear hearing screen completed date: 2022  Left ear screen method: EOAE (evoked otoacoustic emission)  Left ear screen result: Passed  Left ear screen comments: N/A

## 2022-01-13 ENCOUNTER — EMERGENCY (EMERGENCY)
Facility: HOSPITAL | Age: 57
LOS: 1 days | Discharge: HOME | End: 2022-01-13
Attending: STUDENT IN AN ORGANIZED HEALTH CARE EDUCATION/TRAINING PROGRAM
Payer: MEDICARE

## 2022-01-13 VITALS
TEMPERATURE: 98 F | OXYGEN SATURATION: 100 % | HEART RATE: 65 BPM | DIASTOLIC BLOOD PRESSURE: 67 MMHG | RESPIRATION RATE: 19 BRPM | SYSTOLIC BLOOD PRESSURE: 114 MMHG

## 2022-01-13 VITALS
OXYGEN SATURATION: 100 % | HEART RATE: 64 BPM | RESPIRATION RATE: 20 BRPM | DIASTOLIC BLOOD PRESSURE: 58 MMHG | HEIGHT: 65 IN | WEIGHT: 101.85 LBS | SYSTOLIC BLOOD PRESSURE: 133 MMHG | TEMPERATURE: 98 F

## 2022-01-13 DIAGNOSIS — Z95.0 PRESENCE OF CARDIAC PACEMAKER: Chronic | ICD-10-CM

## 2022-01-13 DIAGNOSIS — Z98.89 OTHER SPECIFIED POSTPROCEDURAL STATES: Chronic | ICD-10-CM

## 2022-01-13 DIAGNOSIS — Z87.448 PERSONAL HISTORY OF OTHER DISEASES OF URINARY SYSTEM: Chronic | ICD-10-CM

## 2022-01-13 LAB
ALBUMIN SERPL ELPH-MCNC: 3.5 G/DL — SIGNIFICANT CHANGE UP (ref 3.5–5)
ALP SERPL-CCNC: 86 U/L — SIGNIFICANT CHANGE UP (ref 40–120)
ALT FLD-CCNC: 19 U/L DA — SIGNIFICANT CHANGE UP (ref 10–60)
ANION GAP SERPL CALC-SCNC: 5 MMOL/L — SIGNIFICANT CHANGE UP (ref 5–17)
AST SERPL-CCNC: 21 U/L — SIGNIFICANT CHANGE UP (ref 10–40)
BASOPHILS # BLD AUTO: 0.03 K/UL — SIGNIFICANT CHANGE UP (ref 0–0.2)
BASOPHILS NFR BLD AUTO: 0.5 % — SIGNIFICANT CHANGE UP (ref 0–2)
BILIRUB SERPL-MCNC: 0.3 MG/DL — SIGNIFICANT CHANGE UP (ref 0.2–1.2)
BUN SERPL-MCNC: 13 MG/DL — SIGNIFICANT CHANGE UP (ref 7–18)
CALCIUM SERPL-MCNC: 9.2 MG/DL — SIGNIFICANT CHANGE UP (ref 8.4–10.5)
CHLORIDE SERPL-SCNC: 107 MMOL/L — SIGNIFICANT CHANGE UP (ref 96–108)
CO2 SERPL-SCNC: 26 MMOL/L — SIGNIFICANT CHANGE UP (ref 22–31)
CREAT SERPL-MCNC: 1.37 MG/DL — HIGH (ref 0.5–1.3)
D DIMER BLD IA.RAPID-MCNC: <150 NG/ML DDU — SIGNIFICANT CHANGE UP
EOSINOPHIL # BLD AUTO: 0.13 K/UL — SIGNIFICANT CHANGE UP (ref 0–0.5)
EOSINOPHIL NFR BLD AUTO: 2 % — SIGNIFICANT CHANGE UP (ref 0–6)
GLUCOSE SERPL-MCNC: 95 MG/DL — SIGNIFICANT CHANGE UP (ref 70–99)
HCT VFR BLD CALC: 36.9 % — SIGNIFICANT CHANGE UP (ref 34.5–45)
HGB BLD-MCNC: 12.2 G/DL — SIGNIFICANT CHANGE UP (ref 11.5–15.5)
IMM GRANULOCYTES NFR BLD AUTO: 0.5 % — SIGNIFICANT CHANGE UP (ref 0–1.5)
LIDOCAIN IGE QN: 145 U/L — SIGNIFICANT CHANGE UP (ref 73–393)
LYMPHOCYTES # BLD AUTO: 1.76 K/UL — SIGNIFICANT CHANGE UP (ref 1–3.3)
LYMPHOCYTES # BLD AUTO: 27 % — SIGNIFICANT CHANGE UP (ref 13–44)
MAGNESIUM SERPL-MCNC: 2.4 MG/DL — SIGNIFICANT CHANGE UP (ref 1.6–2.6)
MCHC RBC-ENTMCNC: 30.1 PG — SIGNIFICANT CHANGE UP (ref 27–34)
MCHC RBC-ENTMCNC: 33.1 GM/DL — SIGNIFICANT CHANGE UP (ref 32–36)
MCV RBC AUTO: 91.1 FL — SIGNIFICANT CHANGE UP (ref 80–100)
MONOCYTES # BLD AUTO: 0.54 K/UL — SIGNIFICANT CHANGE UP (ref 0–0.9)
MONOCYTES NFR BLD AUTO: 8.3 % — SIGNIFICANT CHANGE UP (ref 2–14)
NEUTROPHILS # BLD AUTO: 4.02 K/UL — SIGNIFICANT CHANGE UP (ref 1.8–7.4)
NEUTROPHILS NFR BLD AUTO: 61.7 % — SIGNIFICANT CHANGE UP (ref 43–77)
NRBC # BLD: 0 /100 WBCS — SIGNIFICANT CHANGE UP (ref 0–0)
NT-PROBNP SERPL-SCNC: 41 PG/ML — SIGNIFICANT CHANGE UP (ref 0–125)
PLATELET # BLD AUTO: 330 K/UL — SIGNIFICANT CHANGE UP (ref 150–400)
POTASSIUM SERPL-MCNC: 3.8 MMOL/L — SIGNIFICANT CHANGE UP (ref 3.5–5.3)
POTASSIUM SERPL-SCNC: 3.8 MMOL/L — SIGNIFICANT CHANGE UP (ref 3.5–5.3)
PROT SERPL-MCNC: 8.2 G/DL — SIGNIFICANT CHANGE UP (ref 6–8.3)
RBC # BLD: 4.05 M/UL — SIGNIFICANT CHANGE UP (ref 3.8–5.2)
RBC # FLD: 15.9 % — HIGH (ref 10.3–14.5)
SODIUM SERPL-SCNC: 138 MMOL/L — SIGNIFICANT CHANGE UP (ref 135–145)
TROPONIN I, HIGH SENSITIVITY RESULT: 8.6 NG/L — SIGNIFICANT CHANGE UP
WBC # BLD: 6.51 K/UL — SIGNIFICANT CHANGE UP (ref 3.8–10.5)
WBC # FLD AUTO: 6.51 K/UL — SIGNIFICANT CHANGE UP (ref 3.8–10.5)

## 2022-01-13 PROCEDURE — 93010 ELECTROCARDIOGRAM REPORT: CPT

## 2022-01-13 PROCEDURE — 71045 X-RAY EXAM CHEST 1 VIEW: CPT | Mod: 26

## 2022-01-13 PROCEDURE — 99285 EMERGENCY DEPT VISIT HI MDM: CPT | Mod: CS

## 2022-01-13 RX ORDER — SODIUM CHLORIDE 9 MG/ML
1000 INJECTION INTRAMUSCULAR; INTRAVENOUS; SUBCUTANEOUS ONCE
Refills: 0 | Status: COMPLETED | OUTPATIENT
Start: 2022-01-13 | End: 2022-01-13

## 2022-01-13 RX ORDER — ACETAMINOPHEN 500 MG
975 TABLET ORAL ONCE
Refills: 0 | Status: COMPLETED | OUTPATIENT
Start: 2022-01-13 | End: 2022-01-13

## 2022-01-13 RX ADMIN — Medication 975 MILLIGRAM(S): at 16:24

## 2022-01-13 RX ADMIN — SODIUM CHLORIDE 1000 MILLILITER(S): 9 INJECTION INTRAMUSCULAR; INTRAVENOUS; SUBCUTANEOUS at 16:24

## 2022-01-13 RX ADMIN — Medication 975 MILLIGRAM(S): at 16:54

## 2022-01-13 RX ADMIN — SODIUM CHLORIDE 1000 MILLILITER(S): 9 INJECTION INTRAMUSCULAR; INTRAVENOUS; SUBCUTANEOUS at 16:54

## 2022-01-13 NOTE — ED PROVIDER NOTE - OBJECTIVE STATEMENT
56F, pmh of anemia, Asthma, Bladder cancer, HLD HTN (hypertension), Hydronephrosis Liver cyst, Pacemaker, Parathyroid tumor. 56F, pmh of anemia, Asthma, Bladder cancer, HLD HTN (hypertension), Hydronephrosis Liver cyst, Pacemaker, Parathyroid tumor presenting with chest pain. patient reports she tested positive for covid on 1/922. since then she has had fever and body aches and chest pain. was concerned about the chest pain because she is due for a battery change for her pacemaker. chest pain not worsened by deep breathing or exertion.

## 2022-01-13 NOTE — ED PROVIDER NOTE - PATIENT PORTAL LINK FT
You can access the FollowMyHealth Patient Portal offered by [facility] by registering at the following website: [email or web address]. By joining [facility]’s FollowMyHealth portal, you will also be able to view your health information using other applications (apps) compatible with our system.

## 2022-01-13 NOTE — ED ADULT NURSE NOTE - OBJECTIVE STATEMENT
Pt from home with pacemaker, on going chemo tx .x last month, Pt also noted nephrotomy tube to Rt flank intact.

## 2022-01-13 NOTE — ED PROVIDER NOTE - CLINICAL SUMMARY MEDICAL DECISION MAKING FREE TEXT BOX
56F presenting with chest pain in the setting of covid-19. chest pain not exertional or pleuritic. symptoms likely 2/2 covid. will get labs, cxr, ekg. will reassess.

## 2022-01-15 ENCOUNTER — INPATIENT (INPATIENT)
Facility: HOSPITAL | Age: 57
LOS: 5 days | Discharge: HOME | DRG: 689 | End: 2022-01-21
Attending: GENERAL PRACTICE | Admitting: GENERAL PRACTICE
Payer: MEDICARE

## 2022-01-15 VITALS
SYSTOLIC BLOOD PRESSURE: 146 MMHG | WEIGHT: 93.26 LBS | HEIGHT: 65 IN | RESPIRATION RATE: 16 BRPM | OXYGEN SATURATION: 100 % | TEMPERATURE: 97 F | DIASTOLIC BLOOD PRESSURE: 81 MMHG | HEART RATE: 71 BPM

## 2022-01-15 DIAGNOSIS — J45.909 UNSPECIFIED ASTHMA, UNCOMPLICATED: ICD-10-CM

## 2022-01-15 DIAGNOSIS — U07.1 COVID-19: ICD-10-CM

## 2022-01-15 DIAGNOSIS — C67.9 MALIGNANT NEOPLASM OF BLADDER, UNSPECIFIED: ICD-10-CM

## 2022-01-15 DIAGNOSIS — E78.5 HYPERLIPIDEMIA, UNSPECIFIED: ICD-10-CM

## 2022-01-15 DIAGNOSIS — D49.7 NEOPLASM OF UNSPECIFIED BEHAVIOR OF ENDOCRINE GLANDS AND OTHER PARTS OF NERVOUS SYSTEM: ICD-10-CM

## 2022-01-15 DIAGNOSIS — Z98.89 OTHER SPECIFIED POSTPROCEDURAL STATES: Chronic | ICD-10-CM

## 2022-01-15 DIAGNOSIS — E16.2 HYPOGLYCEMIA, UNSPECIFIED: ICD-10-CM

## 2022-01-15 DIAGNOSIS — N39.0 URINARY TRACT INFECTION, SITE NOT SPECIFIED: ICD-10-CM

## 2022-01-15 DIAGNOSIS — Z95.0 PRESENCE OF CARDIAC PACEMAKER: Chronic | ICD-10-CM

## 2022-01-15 DIAGNOSIS — Z87.448 PERSONAL HISTORY OF OTHER DISEASES OF URINARY SYSTEM: Chronic | ICD-10-CM

## 2022-01-15 DIAGNOSIS — R00.1 BRADYCARDIA, UNSPECIFIED: ICD-10-CM

## 2022-01-15 DIAGNOSIS — R91.1 SOLITARY PULMONARY NODULE: ICD-10-CM

## 2022-01-15 DIAGNOSIS — I10 ESSENTIAL (PRIMARY) HYPERTENSION: ICD-10-CM

## 2022-01-15 DIAGNOSIS — Z29.9 ENCOUNTER FOR PROPHYLACTIC MEASURES, UNSPECIFIED: ICD-10-CM

## 2022-01-15 LAB
ALBUMIN SERPL ELPH-MCNC: 3.5 G/DL — SIGNIFICANT CHANGE UP (ref 3.5–5)
ALP SERPL-CCNC: 86 U/L — SIGNIFICANT CHANGE UP (ref 40–120)
ALT FLD-CCNC: 20 U/L DA — SIGNIFICANT CHANGE UP (ref 10–60)
ANION GAP SERPL CALC-SCNC: 6 MMOL/L — SIGNIFICANT CHANGE UP (ref 5–17)
APPEARANCE UR: ABNORMAL
AST SERPL-CCNC: 15 U/L — SIGNIFICANT CHANGE UP (ref 10–40)
BACTERIA # UR AUTO: ABNORMAL /HPF
BASE EXCESS BLDV CALC-SCNC: 2.8 MMOL/L — SIGNIFICANT CHANGE UP
BASOPHILS # BLD AUTO: 0.02 K/UL — SIGNIFICANT CHANGE UP (ref 0–0.2)
BASOPHILS NFR BLD AUTO: 0.3 % — SIGNIFICANT CHANGE UP (ref 0–2)
BILIRUB SERPL-MCNC: 0.2 MG/DL — SIGNIFICANT CHANGE UP (ref 0.2–1.2)
BILIRUB UR-MCNC: NEGATIVE — SIGNIFICANT CHANGE UP
BUN SERPL-MCNC: 20 MG/DL — HIGH (ref 7–18)
CALCIUM SERPL-MCNC: 9.8 MG/DL — SIGNIFICANT CHANGE UP (ref 8.4–10.5)
CHLORIDE SERPL-SCNC: 108 MMOL/L — SIGNIFICANT CHANGE UP (ref 96–108)
CO2 SERPL-SCNC: 28 MMOL/L — SIGNIFICANT CHANGE UP (ref 22–31)
COLOR SPEC: ABNORMAL
CREAT SERPL-MCNC: 1.25 MG/DL — SIGNIFICANT CHANGE UP (ref 0.5–1.3)
DIFF PNL FLD: ABNORMAL
EOSINOPHIL # BLD AUTO: 0.2 K/UL — SIGNIFICANT CHANGE UP (ref 0–0.5)
EOSINOPHIL NFR BLD AUTO: 3 % — SIGNIFICANT CHANGE UP (ref 0–6)
EPI CELLS # UR: ABNORMAL /HPF
GLUCOSE BLDC GLUCOMTR-MCNC: 110 MG/DL — HIGH (ref 70–99)
GLUCOSE SERPL-MCNC: 51 MG/DL — CRITICAL LOW (ref 70–99)
GLUCOSE UR QL: NEGATIVE — SIGNIFICANT CHANGE UP
GRAN CASTS # UR COMP ASSIST: ABNORMAL /LPF
HCO3 BLDV-SCNC: 30 MMOL/L — HIGH (ref 22–29)
HCT VFR BLD CALC: 38.5 % — SIGNIFICANT CHANGE UP (ref 34.5–45)
HGB BLD-MCNC: 12.5 G/DL — SIGNIFICANT CHANGE UP (ref 11.5–15.5)
HYALINE CASTS # UR AUTO: ABNORMAL /LPF
IMM GRANULOCYTES NFR BLD AUTO: 0.3 % — SIGNIFICANT CHANGE UP (ref 0–1.5)
KETONES UR-MCNC: NEGATIVE — SIGNIFICANT CHANGE UP
LEUKOCYTE ESTERASE UR-ACNC: ABNORMAL
LYMPHOCYTES # BLD AUTO: 1.77 K/UL — SIGNIFICANT CHANGE UP (ref 1–3.3)
LYMPHOCYTES # BLD AUTO: 26.7 % — SIGNIFICANT CHANGE UP (ref 13–44)
MAGNESIUM SERPL-MCNC: 2.6 MG/DL — SIGNIFICANT CHANGE UP (ref 1.6–2.6)
MCHC RBC-ENTMCNC: 29.9 PG — SIGNIFICANT CHANGE UP (ref 27–34)
MCHC RBC-ENTMCNC: 32.5 GM/DL — SIGNIFICANT CHANGE UP (ref 32–36)
MCV RBC AUTO: 92.1 FL — SIGNIFICANT CHANGE UP (ref 80–100)
MONOCYTES # BLD AUTO: 0.78 K/UL — SIGNIFICANT CHANGE UP (ref 0–0.9)
MONOCYTES NFR BLD AUTO: 11.8 % — SIGNIFICANT CHANGE UP (ref 2–14)
NEUTROPHILS # BLD AUTO: 3.83 K/UL — SIGNIFICANT CHANGE UP (ref 1.8–7.4)
NEUTROPHILS NFR BLD AUTO: 57.9 % — SIGNIFICANT CHANGE UP (ref 43–77)
NITRITE UR-MCNC: NEGATIVE — SIGNIFICANT CHANGE UP
NRBC # BLD: 0 /100 WBCS — SIGNIFICANT CHANGE UP (ref 0–0)
PCO2 BLDV: 57 MMHG — HIGH (ref 39–42)
PH BLDV: 7.33 — SIGNIFICANT CHANGE UP (ref 7.32–7.43)
PH UR: 8 — SIGNIFICANT CHANGE UP (ref 5–8)
PHOSPHATE SERPL-MCNC: 3.3 MG/DL — SIGNIFICANT CHANGE UP (ref 2.5–4.5)
PLATELET # BLD AUTO: 354 K/UL — SIGNIFICANT CHANGE UP (ref 150–400)
PO2 BLDV: 17 MMHG — SIGNIFICANT CHANGE UP
POTASSIUM SERPL-MCNC: 3.6 MMOL/L — SIGNIFICANT CHANGE UP (ref 3.5–5.3)
POTASSIUM SERPL-SCNC: 3.6 MMOL/L — SIGNIFICANT CHANGE UP (ref 3.5–5.3)
PROT SERPL-MCNC: 7.9 G/DL — SIGNIFICANT CHANGE UP (ref 6–8.3)
PROT UR-MCNC: 500 MG/DL
RBC # BLD: 4.18 M/UL — SIGNIFICANT CHANGE UP (ref 3.8–5.2)
RBC # FLD: 15.6 % — HIGH (ref 10.3–14.5)
RBC CASTS # UR COMP ASSIST: >50 /HPF (ref 0–2)
SAO2 % BLDV: 22 % — SIGNIFICANT CHANGE UP
SARS-COV-2 RNA SPEC QL NAA+PROBE: DETECTED
SODIUM SERPL-SCNC: 142 MMOL/L — SIGNIFICANT CHANGE UP (ref 135–145)
SP GR SPEC: 1.01 — SIGNIFICANT CHANGE UP (ref 1.01–1.02)
UROBILINOGEN FLD QL: NEGATIVE — SIGNIFICANT CHANGE UP
WBC # BLD: 6.62 K/UL — SIGNIFICANT CHANGE UP (ref 3.8–10.5)
WBC # FLD AUTO: 6.62 K/UL — SIGNIFICANT CHANGE UP (ref 3.8–10.5)
WBC UR QL: ABNORMAL /HPF (ref 0–5)

## 2022-01-15 PROCEDURE — 99285 EMERGENCY DEPT VISIT HI MDM: CPT | Mod: CS

## 2022-01-15 RX ORDER — SODIUM CHLORIDE 9 MG/ML
1000 INJECTION, SOLUTION INTRAVENOUS
Refills: 0 | Status: DISCONTINUED | OUTPATIENT
Start: 2022-01-15 | End: 2022-01-21

## 2022-01-15 RX ORDER — SODIUM CHLORIDE 9 MG/ML
1000 INJECTION INTRAMUSCULAR; INTRAVENOUS; SUBCUTANEOUS ONCE
Refills: 0 | Status: COMPLETED | OUTPATIENT
Start: 2022-01-15 | End: 2022-01-15

## 2022-01-15 RX ORDER — CEFTRIAXONE 500 MG/1
1000 INJECTION, POWDER, FOR SOLUTION INTRAMUSCULAR; INTRAVENOUS EVERY 24 HOURS
Refills: 0 | Status: DISCONTINUED | OUTPATIENT
Start: 2022-01-16 | End: 2022-01-19

## 2022-01-15 RX ORDER — ONDANSETRON 8 MG/1
4 TABLET, FILM COATED ORAL ONCE
Refills: 0 | Status: COMPLETED | OUTPATIENT
Start: 2022-01-15 | End: 2022-01-15

## 2022-01-15 RX ORDER — GLUCAGON INJECTION, SOLUTION 0.5 MG/.1ML
1 INJECTION, SOLUTION SUBCUTANEOUS ONCE
Refills: 0 | Status: DISCONTINUED | OUTPATIENT
Start: 2022-01-15 | End: 2022-01-21

## 2022-01-15 RX ORDER — DEXTROSE 50 % IN WATER 50 %
25 SYRINGE (ML) INTRAVENOUS ONCE
Refills: 0 | Status: DISCONTINUED | OUTPATIENT
Start: 2022-01-15 | End: 2022-01-17

## 2022-01-15 RX ORDER — INSULIN LISPRO 100/ML
VIAL (ML) SUBCUTANEOUS
Refills: 0 | Status: DISCONTINUED | OUTPATIENT
Start: 2022-01-15 | End: 2022-01-21

## 2022-01-15 RX ORDER — DEXTROSE 50 % IN WATER 50 %
50 SYRINGE (ML) INTRAVENOUS ONCE
Refills: 0 | Status: COMPLETED | OUTPATIENT
Start: 2022-01-15 | End: 2022-01-15

## 2022-01-15 RX ORDER — DEXTROSE 50 % IN WATER 50 %
15 SYRINGE (ML) INTRAVENOUS ONCE
Refills: 0 | Status: DISCONTINUED | OUTPATIENT
Start: 2022-01-15 | End: 2022-01-17

## 2022-01-15 RX ORDER — CEFTRIAXONE 500 MG/1
1000 INJECTION, POWDER, FOR SOLUTION INTRAMUSCULAR; INTRAVENOUS ONCE
Refills: 0 | Status: COMPLETED | OUTPATIENT
Start: 2022-01-15 | End: 2022-01-15

## 2022-01-15 RX ORDER — ACETAMINOPHEN 500 MG
650 TABLET ORAL EVERY 6 HOURS
Refills: 0 | Status: DISCONTINUED | OUTPATIENT
Start: 2022-01-15 | End: 2022-01-21

## 2022-01-15 RX ORDER — DEXTROSE 50 % IN WATER 50 %
12.5 SYRINGE (ML) INTRAVENOUS ONCE
Refills: 0 | Status: DISCONTINUED | OUTPATIENT
Start: 2022-01-15 | End: 2022-01-21

## 2022-01-15 RX ORDER — ENOXAPARIN SODIUM 100 MG/ML
40 INJECTION SUBCUTANEOUS DAILY
Refills: 0 | Status: DISCONTINUED | OUTPATIENT
Start: 2022-01-15 | End: 2022-01-20

## 2022-01-15 RX ORDER — CEFTRIAXONE 500 MG/1
INJECTION, POWDER, FOR SOLUTION INTRAMUSCULAR; INTRAVENOUS
Refills: 0 | Status: DISCONTINUED | OUTPATIENT
Start: 2022-01-15 | End: 2022-01-19

## 2022-01-15 RX ADMIN — Medication 50 MILLILITER(S): at 12:40

## 2022-01-15 RX ADMIN — CEFTRIAXONE 100 MILLIGRAM(S): 500 INJECTION, POWDER, FOR SOLUTION INTRAMUSCULAR; INTRAVENOUS at 23:08

## 2022-01-15 NOTE — ED ADULT NURSE NOTE - CHIEF COMPLAINT QUOTE
patient says her Blood sugar was 202 at home and nausea, covid + on January 9, urinary symptoms symmetric

## 2022-01-15 NOTE — PATIENT PROFILE ADULT - FALL HARM RISK - UNIVERSAL INTERVENTIONS
Bed in lowest position, wheels locked, appropriate side rails in place/Call bell, personal items and telephone in reach/Instruct patient to call for assistance before getting out of bed or chair/Non-slip footwear when patient is out of bed/[place] to call system/Physically safe environment - no spills, clutter or unnecessary equipment/Purposeful Proactive Rounding/Room/bathroom lighting operational, light cord in reach

## 2022-01-15 NOTE — ED PROVIDER NOTE - OBJECTIVE STATEMENT
56F, pmh of anemia, Asthma, Bladder cancer, HLD HTN, Hydronephrosis Liver cyst, Pacemaker, Parathyroid tumor, presenting with hyperglycemia. patient reports she checked her blood sugar this morning and it was in the 200s. has some nausea. no fever, headache, chest pain or shortness of breath. no history of DM, has history of low blood sugar when she does not eat.

## 2022-01-15 NOTE — PATIENT PROFILE ADULT - OVER THE PAST TWO WEEKS HAVE YOU FELT DOWN, DEPRESSED OR HOPELESS?
Onset: Yesterday 2100    Location/description: Stomach pains.  Gave pepto for kids.  Vomited around 11.  Emesis x 3 in 3 hours.  Woke with tactile fever.  Took some chicken soup today.  Nausea, headache persist.      Associated Symptoms:     What improves/worsens symptoms:     Symptom specific medications: Pepto    Input and Output: Taking water, chicken soup.  Hasn't voided today.      Activity level: Laying on couch.  When getting up stomach hurts.      Temperature (route and time): Tactile fever    Weight:     Wt Readings from Last 1 Encounters:   02/11/18 (!) 18 kg (1 %, Z= -2.26)*     * Growth percentiles are based on CDC (Boys, 2-20 Years) data.            Recent Care: No recent related.    Reason for Disposition  • [1] MODERATE vomiting (3-7 times/day) AND [2] age > 1 year old AND [3] present < 48  hours    Protocols used: VOMITING WITHOUT DIARRHEA-P-AH      
Regarding: stomach pain  ----- Message from Zoraida Jama sent at 12/8/2018  1:25 PM CST -----  Patient Name: Yramujcoleman Robison  Specialist or PCP:Sarkis  Pregnant (If Yes, how long?):na  Symptoms:stomach pain  Call Back #:[phone]  Is the patient’s permanent residence located in [place], [place], or a [facility]? Yes [facility] [address]  Call Center Account #:[id number]      
no

## 2022-01-15 NOTE — ED PROVIDER NOTE - PROGRESS NOTE DETAILS
blood sugar remained stable after PO intake. will discharge. Aldo Simon spoke with Dr. Rosario who requests the patient be admitted to Dr. Trujillo for continued weakness and pacemaker replacement. Aldo Simon

## 2022-01-15 NOTE — ED PROVIDER NOTE - CLINICAL SUMMARY MEDICAL DECISION MAKING FREE TEXT BOX
56F presenting with hyperglycemia. found to be hypoglycemic upon ED arrival. denies taking any insulin or oral hyperglycemic medications. has not eaten yet today. eating food in the ED. declining dextrose at this time as patient is afraid of sugar becoming too high. will observe.

## 2022-01-15 NOTE — ED PROVIDER NOTE - NS ED MD DISPO ADMITTING SERVICE
History of Present Illness: The patient is a 64 y o  female who presents with complaints of neck and arm pain      Patient Active Problem List   Diagnosis    Abnormal glucose    Back pain    Benign essential HTN    Bites    Cervical herniated disc    Cervical radiculopathy    Coronary arteriosclerosis    Chronic stable angina (HCC)    Degenerative disc disease, cervical    Depression with anxiety    Fibromyalgia    Gastroesophageal reflux disease    Hypothyroidism    Hypercholesterolemia    Insomnia    Lumbar foraminal stenosis    Lumbar radiculopathy    Sacroiliitis (HCC)    Severe episode of recurrent major depressive disorder, without psychotic features (HCC)    Skin rash    Myofascial pain    Asthma    Brachial neuritis    Cervical spondylosis    Chronic low back pain    Dysthymic disorder    Irritable bowel syndrome without diarrhea    Pain in thoracic spine    Reflex sympathetic dystrophy    Cervical stenosis of spinal canal    Status post lumbar surgery    Chronic pain syndrome    Laryngitis    Hearing problem of both ears    Generalized weakness       Past Medical History:   Diagnosis Date    Abnormal echocardiogram     Abnormal stress test     Anxiety     Asthma     Brachial neuritis     Depression     Disease of thyroid gland     Displacement of intervertebral disc of mid-cervical region     Fibromyalgia     Frequent headaches     GERD (gastroesophageal reflux disease)     Herniated nucleus pulposus     History of arthritis     History of asthma     History of cardiac disorder     History of chest pain     History of diverticulitis     History of fatigue     History of hearing loss     History of hemoptysis     History of herpes simplex infection     History of hiatal hernia     History of insect bite     INFECTED    History of memory loss     History of neck disorder     History of reflex sympathetic dystrophy     History of restless legs syndrome     History of shortness of breath     History of spinal stenosis     Hyperlipidemia     Hyperlipidemia     Hypertension     Skin rash     Somnolence, daytime     Spinal stenosis of cervical region        Past Surgical History:   Procedure Laterality Date    BACK SURGERY      BACK SURGERY      LOWER BACK SURGERY    CARDIAC CATHETERIZATION      HISTORY OF CORONARY ANGIOGRAPHY WITH CONCOMITANT LEFT HEART CATHETERIZATION    CORONARY ANGIOPLASTY WITH STENT PLACEMENT      EAR SURGERY      TONSILLECTOMY      TYMPANOPLASTY           Current Outpatient Prescriptions:     acetaminophen (TYLENOL) 325 mg tablet, For headache, Disp: 30 tablet, Rfl: 0    albuterol (PROVENTIL HFA,VENTOLIN HFA) 90 mcg/act inhaler, Inhale 2 puffs every 6 (six) hours as needed for wheezing, Disp: 18 g, Rfl: 0    aspirin 81 mg chewable tablet, Chew 81 mg daily, Disp: , Rfl:     atorvastatin (LIPITOR) 80 mg tablet, TAKE 1 TABLET BY MOUTH EVERY DAY, Disp: 90 tablet, Rfl: 2    Butalbital-APAP-Caffeine -40 MG CAPS, Take 2 capsules by mouth daily as needed (PRN), Disp: 30 capsule, Rfl: 0    citalopram (CeleXA) 20 mg tablet, TAKE 1 TABLET BY MOUTH EVERY DAY, Disp: 30 tablet, Rfl: 3    clopidogrel (PLAVIX) 75 mg tablet, Take 75 mg by mouth daily, Disp: , Rfl:     dextromethorphan-guaifenesin (MUCINEX DM)  MG per 12 hr tablet, Take 1 tablet by mouth every 12 (twelve) hours, Disp: 20 tablet, Rfl: 1    fluticasone (FLONASE) 50 mcg/act nasal spray, 1 spray into each nostril daily (Patient taking differently: 1 spray into each nostril daily as needed  ), Disp: 16 g, Rfl: 0    fluticasone-salmeterol (ADVAIR) 250-50 mcg/dose inhaler, Inhale 1 puff every 12 (twelve) hours as needed (sob), Disp: 1 each, Rfl: 1    hydrOXYzine HCL (ATARAX) 25 mg tablet, Take 1 tablet (25 mg total) by mouth 2 (two) times a day as needed for itching, Disp: 180 tablet, Rfl: 1    levothyroxine 125 mcg tablet, TAKE 1 TABLET DAILY  , Disp: 30 tablet, Rfl: 5    losartan (COZAAR) 25 mg tablet, TAKE 1 TABLET DAILY  , Disp: 30 tablet, Rfl: 5    magnesium oxide (MAG-OX) 400 mg, Take 1 tablet (400 mg total) by mouth 2 (two) times a day, Disp: 60 tablet, Rfl: 0    methocarbamol (ROBAXIN) 500 mg tablet, Take 1 tablet (500 mg total) by mouth 3 (three) times a day as needed for muscle spasms, Disp: 90 tablet, Rfl: 2    metoprolol succinate (TOPROL-XL) 25 mg 24 hr tablet, Take 25 mg by mouth daily, Disp: , Rfl:     pantoprazole (PROTONIX) 40 mg tablet, Take 1 tablet (40 mg total) by mouth daily, Disp: 90 tablet, Rfl: 0    pregabalin (LYRICA) 150 mg capsule, Take 1 capsule (150 mg total) by mouth 3 (three) times a day, Disp: 270 capsule, Rfl: 1    triamcinolone (KENALOG) 0 1 % cream, Apply topically 2 (two) times a day, Disp: 80 g, Rfl: 1    valACYclovir (VALTREX) 1,000 mg tablet, TAKE 1 TABLET BY MOUTH THREE TIMES A DAY FOR 10 DAYS, Disp: 30 tablet, Rfl: 1    zolpidem (AMBIEN) 10 mg tablet, Take 1 tablet (10 mg total) by mouth daily at bedtime as needed for sleep, Disp: 30 tablet, Rfl: 0    Allergies   Allergen Reactions    Augmentin [Amoxicillin-Pot Clavulanate] Nausea Only     PT stated she only allergic to medication AUGMENTIN       Physical Exam:   Vitals:    12/12/18 0955   BP: 120/77   Pulse: 65   Resp: 20   Temp: 97 9 °F (36 6 °C)   SpO2: 93%     General: Awake, Alert, Oriented x 3, Mood and affect appropriate  Respiratory: Respirations even and unlabored  Cardiovascular: Peripheral pulses intact; no edema  Musculoskeletal Exam:  Bilateral cervical paraspinals tender to palpation  ASA Score: 3    Patient/Chart Verification  Patient ID Verified: Verbal  ID Band Applied: No  Consents Confirmed: Procedural, To be obtained in the Pre-Procedure area  H&P( within 30 days) Verified: To be obtained in the Pre-Procedure area  Interval H&P(within 24 hr) Complete (required for Outpatients and Surgery Admit only):  To be obtained in the Pre-Procedure area  Allergies Reviewed: Yes  Anticoag/NSAID held?: Yes (ASA, Plavix, Aleve, Motrin)  Currently on antibiotics?: No    Assessment:   1   Cervical radiculopathy        Plan: SAURABH-ASA/Plavix MEDICINE

## 2022-01-15 NOTE — H&P ADULT - ASSESSMENT
Patient is a 56yoF, w/ PMH of asthma, bladder CA (on chemo, s/p nephrostomy tube on right kidney), HTN, and HLD, p/w with left-sided chest pain and back pain this morning. Admitted for UTI

## 2022-01-15 NOTE — ED ADULT NURSE NOTE - OBJECTIVE STATEMENT
Pt on chemo and covid + presents to ED c/o high BG of 209. Pt instructed to come for further evaluation from PCP. Pt states she is normally hypoglycemic so checks her BG ofter. This RN checked pt's BG and BG 61. MD notified and dextrose ordered and breakfast provided.

## 2022-01-15 NOTE — H&P ADULT - HISTORY OF PRESENT ILLNESS
Patient is a 56yoF, w/ PMH of asthma, bradycardia (s/p pacemaker placement), bladder CA (on chemo, s/p nephrostomy tube on right kidney), HTN, and HLD, p/w with left-sided chest pain and back pain this morning. Pain is 8/10, pressure-like, intermittent, and non-radiating. She reports pain worsens with coughing. She complains of productive cough, which also contributes to her SOB and palpitation. She endorses chills, nausea, diarrhea, and one episode of bloody urine. She denies fever, vomiting, or abdominal pain.     Of note, patient noted to have elevated bG of 200's at home, but her blood glucose was low 50's in ED.

## 2022-01-15 NOTE — H&P ADULT - PROBLEM SELECTOR PLAN 1
- p/w bloody urine (h/o bloody urine w/ UTI in the past)  - U/A pos  - last culture acinetobacter, sensitive to ceftriaxone (Sept2021)  - c/w ceftriaxone  - f/u Ucx, Bcx

## 2022-01-15 NOTE — ED PROVIDER NOTE - PATIENT PORTAL LINK FT
You can access the FollowMyHealth Patient Portal offered by [facility] by registering at the following website: [email or web address]. By joining [facility]’s FollowMyHealth portal, you will also be able to view your health information using other applications (apps) compatible with our system.

## 2022-01-15 NOTE — ED PROVIDER NOTE - NSFOLLOWUPINSTRUCTIONS_ED_ALL_ED_FT
You were seen in the emergency department for hypoglycemia.     Please follow-up with your primary care doctor in the next 2 4-48 hours.     If you have any worsening symptoms, severe headache, chest pain, shortness of breath, nausea or vomiting, please return to the emergency department.

## 2022-01-15 NOTE — ED ADULT TRIAGE NOTE - NS ED NURSE BANDS TYPE
Name band; Bill For Surgical Tray: no Billing Type: Third-Party Bill Expected Date Of Service: 07/08/2020 Performing Laboratory: -26

## 2022-01-15 NOTE — H&P ADULT - PROBLEM SELECTOR PLAN 4
- h/o bladder CA (on chemo, s/p right nephrostomy)  - no chemo due to COVID infection  - supportive care

## 2022-01-15 NOTE — H&P ADULT - NSHPADDITIONALINFOADULT_GEN_ALL_CORE
Patient evaluated, case discussed with me, chart reviewed, agree with above H/P as reviewed.  Dr. LUIS ANGEL Ochoa ([phone])

## 2022-01-16 LAB
A1C WITH ESTIMATED AVERAGE GLUCOSE RESULT: 5.5 % — SIGNIFICANT CHANGE UP (ref 4–5.6)
ALBUMIN SERPL ELPH-MCNC: 3.1 G/DL — LOW (ref 3.5–5)
ALP SERPL-CCNC: 77 U/L — SIGNIFICANT CHANGE UP (ref 40–120)
ALT FLD-CCNC: 16 U/L DA — SIGNIFICANT CHANGE UP (ref 10–60)
ANION GAP SERPL CALC-SCNC: 5 MMOL/L — SIGNIFICANT CHANGE UP (ref 5–17)
APTT BLD: 30.2 SEC — SIGNIFICANT CHANGE UP (ref 27.5–35.5)
AST SERPL-CCNC: 10 U/L — SIGNIFICANT CHANGE UP (ref 10–40)
BASOPHILS # BLD AUTO: 0.03 K/UL — SIGNIFICANT CHANGE UP (ref 0–0.2)
BASOPHILS NFR BLD AUTO: 0.5 % — SIGNIFICANT CHANGE UP (ref 0–2)
BILIRUB SERPL-MCNC: 0.2 MG/DL — SIGNIFICANT CHANGE UP (ref 0.2–1.2)
BUN SERPL-MCNC: 16 MG/DL — SIGNIFICANT CHANGE UP (ref 7–18)
CALCIUM SERPL-MCNC: 9.9 MG/DL — SIGNIFICANT CHANGE UP (ref 8.4–10.5)
CHLORIDE SERPL-SCNC: 108 MMOL/L — SIGNIFICANT CHANGE UP (ref 96–108)
CHOLEST SERPL-MCNC: 226 MG/DL — HIGH
CO2 SERPL-SCNC: 29 MMOL/L — SIGNIFICANT CHANGE UP (ref 22–31)
CREAT SERPL-MCNC: 1.15 MG/DL — SIGNIFICANT CHANGE UP (ref 0.5–1.3)
CRP SERPL-MCNC: <3 MG/L — SIGNIFICANT CHANGE UP
D DIMER BLD IA.RAPID-MCNC: <150 NG/ML DDU — SIGNIFICANT CHANGE UP
EOSINOPHIL # BLD AUTO: 0.2 K/UL — SIGNIFICANT CHANGE UP (ref 0–0.5)
EOSINOPHIL NFR BLD AUTO: 3.2 % — SIGNIFICANT CHANGE UP (ref 0–6)
ESTIMATED AVERAGE GLUCOSE: 111 MG/DL — SIGNIFICANT CHANGE UP (ref 68–114)
FERRITIN SERPL-MCNC: 84 NG/ML — SIGNIFICANT CHANGE UP (ref 15–150)
FIBRINOGEN PPP-MCNC: 478 MG/DL — SIGNIFICANT CHANGE UP (ref 290–520)
GLUCOSE BLDC GLUCOMTR-MCNC: 103 MG/DL — HIGH (ref 70–99)
GLUCOSE BLDC GLUCOMTR-MCNC: 83 MG/DL — SIGNIFICANT CHANGE UP (ref 70–99)
GLUCOSE BLDC GLUCOMTR-MCNC: 88 MG/DL — SIGNIFICANT CHANGE UP (ref 70–99)
GLUCOSE BLDC GLUCOMTR-MCNC: 89 MG/DL — SIGNIFICANT CHANGE UP (ref 70–99)
GLUCOSE SERPL-MCNC: 98 MG/DL — SIGNIFICANT CHANGE UP (ref 70–99)
HCT VFR BLD CALC: 36.4 % — SIGNIFICANT CHANGE UP (ref 34.5–45)
HDLC SERPL-MCNC: 53 MG/DL — SIGNIFICANT CHANGE UP
HGB BLD-MCNC: 11.7 G/DL — SIGNIFICANT CHANGE UP (ref 11.5–15.5)
IMM GRANULOCYTES NFR BLD AUTO: 0.2 % — SIGNIFICANT CHANGE UP (ref 0–1.5)
INR BLD: 1.12 RATIO — SIGNIFICANT CHANGE UP (ref 0.88–1.16)
LDH SERPL L TO P-CCNC: 185 U/L — SIGNIFICANT CHANGE UP (ref 120–225)
LIPID PNL WITH DIRECT LDL SERPL: 148 MG/DL — HIGH
LYMPHOCYTES # BLD AUTO: 1.78 K/UL — SIGNIFICANT CHANGE UP (ref 1–3.3)
LYMPHOCYTES # BLD AUTO: 28.3 % — SIGNIFICANT CHANGE UP (ref 13–44)
MAGNESIUM SERPL-MCNC: 2.6 MG/DL — SIGNIFICANT CHANGE UP (ref 1.6–2.6)
MCHC RBC-ENTMCNC: 29.6 PG — SIGNIFICANT CHANGE UP (ref 27–34)
MCHC RBC-ENTMCNC: 32.1 GM/DL — SIGNIFICANT CHANGE UP (ref 32–36)
MCV RBC AUTO: 92.2 FL — SIGNIFICANT CHANGE UP (ref 80–100)
MONOCYTES # BLD AUTO: 0.55 K/UL — SIGNIFICANT CHANGE UP (ref 0–0.9)
MONOCYTES NFR BLD AUTO: 8.8 % — SIGNIFICANT CHANGE UP (ref 2–14)
NEUTROPHILS # BLD AUTO: 3.71 K/UL — SIGNIFICANT CHANGE UP (ref 1.8–7.4)
NEUTROPHILS NFR BLD AUTO: 59 % — SIGNIFICANT CHANGE UP (ref 43–77)
NON HDL CHOLESTEROL: 173 MG/DL — HIGH
NRBC # BLD: 0 /100 WBCS — SIGNIFICANT CHANGE UP (ref 0–0)
PHOSPHATE SERPL-MCNC: 3.1 MG/DL — SIGNIFICANT CHANGE UP (ref 2.5–4.5)
PLATELET # BLD AUTO: 357 K/UL — SIGNIFICANT CHANGE UP (ref 150–400)
POTASSIUM SERPL-MCNC: 3.9 MMOL/L — SIGNIFICANT CHANGE UP (ref 3.5–5.3)
POTASSIUM SERPL-SCNC: 3.9 MMOL/L — SIGNIFICANT CHANGE UP (ref 3.5–5.3)
PROCALCITONIN SERPL-MCNC: 0.02 NG/ML — SIGNIFICANT CHANGE UP (ref 0.02–0.1)
PROT SERPL-MCNC: 7.2 G/DL — SIGNIFICANT CHANGE UP (ref 6–8.3)
PROTHROM AB SERPL-ACNC: 13.3 SEC — SIGNIFICANT CHANGE UP (ref 10.6–13.6)
RBC # BLD: 3.95 M/UL — SIGNIFICANT CHANGE UP (ref 3.8–5.2)
RBC # FLD: 15.9 % — HIGH (ref 10.3–14.5)
SODIUM SERPL-SCNC: 142 MMOL/L — SIGNIFICANT CHANGE UP (ref 135–145)
TRIGL SERPL-MCNC: 126 MG/DL — SIGNIFICANT CHANGE UP
TSH SERPL-MCNC: 0.39 UU/ML — SIGNIFICANT CHANGE UP (ref 0.34–4.82)
WBC # BLD: 6.28 K/UL — SIGNIFICANT CHANGE UP (ref 3.8–10.5)
WBC # FLD AUTO: 6.28 K/UL — SIGNIFICANT CHANGE UP (ref 3.8–10.5)

## 2022-01-16 RX ADMIN — CEFTRIAXONE 100 MILLIGRAM(S): 500 INJECTION, POWDER, FOR SOLUTION INTRAMUSCULAR; INTRAVENOUS at 22:49

## 2022-01-16 RX ADMIN — ENOXAPARIN SODIUM 40 MILLIGRAM(S): 100 INJECTION SUBCUTANEOUS at 11:52

## 2022-01-16 NOTE — PROGRESS NOTE ADULT - ASSESSMENT
_________________________________________________________________________________________  ========>>  M E D I C A L   A T T E N D I N G    F O L L O W  U P  N O T E  <<=========  -----------------------------------------------------------------------------------------------------    - Patient seen and examined by me earlier today.   - In summary,  JEF HOUSE is a 56y year old woman admitted with low sugar ..  - Patient today overall doing ok, comfortable, eating OK. weak but improved     ==================>> REVIEW OF SYSTEM <<=================    GEN: no fever, no chills, no pain  RESP: no SOB, no cough, no sputum  CVS: no chest pain, no palpitations, no edema  GI: no abdominal pain, no nausea, no constipation, no diarrhea  : no dysuria, no frequency, no hematuria  Neuro: no headache, no dizziness  Derm : no itching, no rash    ==================>> PHYSICAL EXAM <<=================    GEN: A&O X 3 , NAD , comfortable, pleasant, calm , cachectic   HEENT: NCAT, PERRL, MMM, hearing intact  Neck: supple , no JVD appreciated  CVS: S1S2 , regular , No M/R/G appreciated  PULM: CTA B/L,  no W/R/R appreciated  ABD.: soft. non tender, non distended,  bowel sounds present  Extrem: intact pulses , no edema     nephrostomy in place   PSYCH : normal mood,  not anxious                            ( Note Written / Date of service :  22 )    ==================>> MEDICATIONS <<====================    MEDICATIONS  (STANDING):  cefTRIAXone   IVPB 1000 milliGRAM(s) IV Intermittent every 24 hours  cefTRIAXone   IVPB      dextrose 40% Gel 15 Gram(s) Oral once  dextrose 5%. 1000 milliLiter(s) (50 mL/Hr) IV Continuous <Continuous>  dextrose 5%. 1000 milliLiter(s) (100 mL/Hr) IV Continuous <Continuous>  dextrose 50% Injectable 25 Gram(s) IV Push once  dextrose 50% Injectable 12.5 Gram(s) IV Push once  dextrose 50% Injectable 25 Gram(s) IV Push once  enoxaparin Injectable 40 milliGRAM(s) SubCutaneous daily  glucagon  Injectable 1 milliGRAM(s) IntraMuscular once  insulin lispro (ADMELOG) corrective regimen sliding scale   SubCutaneous Before meals and at bedtime    MEDICATIONS  (PRN):  acetaminophen     Tablet .. 650 milliGRAM(s) Oral every 6 hours PRN Temp greater or equal to 38C (100.4F), Mild Pain (1 - 3)    ___________  Active diet:  Diet, Regular:   Consistent Carbohydrate No Snacks  No Carb Prosource (1pkg = 15gms Protein)     Qty per Day:  2  Supplement Feeding Modality:  Oral  Ensure Plant-Based Cans or Servings Per Day:  2       Frequency:  Two Times a day  ___________________    ==================>> VITAL SIGNS <<==================    T(C): 36.7 (22 @ 14:16), Max: 36.7 (01-15-22 @ 22:20)  HR: 60 (22 @ 14:16) (53 - 60)  BP: 110/66 (22 @ 14:16) (107/59 - 123/69)  BP(mean): --  RR: 18 (22 @ 14:16) (16 - 18)  SpO2: 98% (22 @ 14:16) (98% - 100%)     CAPILLARY BLOOD GLUCOSE  POCT Blood Glucose.: 88 mg/dL (2022 17:24)  POCT Blood Glucose.: 89 mg/dL (2022 11:40)  POCT Blood Glucose.: 83 mg/dL (2022 08:23)  POCT Blood Glucose.: 110 mg/dL (15 Fabrizio 2022 22:14)    I&O's Summary       ==================>> LAB AND IMAGING <<==================                        11.7   6.28  )-----------( 357      ( 2022 12:26 )             36.4            142  |  108  |  16  ----------------------------<  98  3.9   |  29  |  1.15    Ca    9.9      2022 12:26  Phos  3.1       Mg     2.6         TPro  7.2  /  Alb  3.1<L>  /  TBili  0.2  /  DBili  x   /  AST  10  /  ALT  16  /  AlkPhos  77      PT/INR - ( 2022 12:26 )   PT: 13.3 sec;   INR: 1.12 ratio         PTT - ( 2022 12:26 )  PTT:30.2 sec                 Urinalysis Basic - ( 15 Fabrizio 2022 21:43 )  Color: Brown / Appearance: very cloudy / S.015 / pH: x  Gluc: x / Ketone: Negative  / Bili: Negative / Urobili: Negative   Blood: x / Protein: 500 mg/dL / Nitrite: Negative   Leuk Esterase: Small / RBC: >50 /HPF / WBC 6-10 /HPF   Sq Epi: x / Non Sq Epi: Moderate /HPF / Bacteria: Moderate /HPF    TSH:      0.39   (22)           Lipid profile:  (22)     Total: 226     LDL  : (p)     HDL  :53     TG   :126     ___________________________________________________________________________________  ===============>>  A S S E S S M E N T   A N D   P L A N <<===============  ------------------------------------------------------------------------------------------    · Assessment	  Patient is a 56yoF, w/ PMH of asthma, bladder CA (on chemo, s/p nephrostomy tube on right kidney), HTN, and HLD, p/w with left-sided chest pain and back pain. Admitted for UTI      Problem/Plan - 1:  ·  Problem: mild hemorrhagic cystitis   ·  Plan: - p/w bloody urine (h/o bloody urine w/ UTI in the past)  - last culture acinetobacter, sensitive to ceftriaxone ()  - c/w ceftriaxone  - f/u Ucx, Bcx.  - nutrition  / hydration     Problem/Plan - 2:  ·  Problem: 2019 novel coronavirus disease (COVID-19).   ·  Plan: - COVID pos  - no pulmonary sxs  - supportive care.    Problem/Plan - 3:  ·  Problem: Bradycardia.   ·  Plan: - h/o bradycardia, s/p pacemaker placement  - trop, BNP negative  - plan for battery change before       discussed with Dr Rosario     Problem/Plan - 4:  ·  Problem: Bladder cancer.   ·  Plan: - h/o bladder CA (on chemo, s/p right nephrostomy)  - no chemo due to COVID infection  - supportive care.    Problem/Plan - 5:  ·  Problem: HLD (hyperlipidemia).   ·  Plan: - h/o HLD, not on any meds  - f/u lipid panel.    Problem/Plan - 6:  ·  Problem: HTN (hypertension).   ·  Plan: - h/o HTN, not on any meds  - monitor BP  - start anti-hypertensive if indicated.    Problem/Plan - 7:  ·  Problem: Parathyroid tumor.   ·  Plan: - h/o parathyroid tumor, plan for surgical management in the future  - supportive care.    Problem/Plan - 8:  ·  Problem: Lung nodule.   ·  Plan: - h/o b/l lung nodules, being followed by pulmonologist / onc   - supportive care.    Problem/Plan - 9:  ·  Problem: Prophylactic measure.   ·  Plan: - DVT: lovenox  - GI: diet / supplements     --------------------------------------------  Case discussed with pt, HS  Education given on findings and plan of care  ___________________________  H. NIEVES Ochoa.  Pager: [phone]       _________________________________________________________________________________________  ========>>  M E D I C A L   A T T E N D I N G    F O L L O W  U P  N O T E  <<=========  -----------------------------------------------------------------------------------------------------    - Patient seen and examined by me earlier today.   - In summary,  JEF HOUSE is a 56y year old woman admitted with low sugar ..  - Patient today overall doing ok, comfortable, eating OK. weak but improved     ==================>> REVIEW OF SYSTEM <<=================    GEN: no fever, no chills, no pain  RESP: no SOB, occasional cough wit some yellow sputum  CVS: no chest pain, no palpitations, no edema  GI: no abdominal pain, no nausea, no constipation, no diarrhea  : no dysuria, no frequency, no hematuria  Neuro: no headache, no dizziness  Derm : no itching, no rash    ==================>> PHYSICAL EXAM <<=================    GEN: A&O X 3 , NAD , comfortable, pleasant, calm , cachectic   HEENT: NCAT, PERRL, MMM, hearing intact  Neck: supple , no JVD appreciated  CVS: S1S2 , regular , No M/R/G appreciated  PULM: CTA B/L,  no W/R/R appreciated  ABD.: soft. non tender, non distended,  bowel sounds present  Extrem: intact pulses , no edema     nephrostomy in place   PSYCH : normal mood,  not anxious                            ( Note Written / Date of service :  22 )    ==================>> MEDICATIONS <<====================    MEDICATIONS  (STANDING):  cefTRIAXone   IVPB 1000 milliGRAM(s) IV Intermittent every 24 hours  cefTRIAXone   IVPB      dextrose 40% Gel 15 Gram(s) Oral once  dextrose 5%. 1000 milliLiter(s) (50 mL/Hr) IV Continuous <Continuous>  dextrose 5%. 1000 milliLiter(s) (100 mL/Hr) IV Continuous <Continuous>  dextrose 50% Injectable 25 Gram(s) IV Push once  dextrose 50% Injectable 12.5 Gram(s) IV Push once  dextrose 50% Injectable 25 Gram(s) IV Push once  enoxaparin Injectable 40 milliGRAM(s) SubCutaneous daily  glucagon  Injectable 1 milliGRAM(s) IntraMuscular once  insulin lispro (ADMELOG) corrective regimen sliding scale   SubCutaneous Before meals and at bedtime    MEDICATIONS  (PRN):  acetaminophen     Tablet .. 650 milliGRAM(s) Oral every 6 hours PRN Temp greater or equal to 38C (100.4F), Mild Pain (1 - 3)    ___________  Active diet:  Diet, Regular:   Consistent Carbohydrate No Snacks  No Carb Prosource (1pkg = 15gms Protein)     Qty per Day:  2  Supplement Feeding Modality:  Oral  Ensure Plant-Based Cans or Servings Per Day:  2       Frequency:  Two Times a day  ___________________    ==================>> VITAL SIGNS <<==================    T(C): 36.7 (22 @ 14:16), Max: 36.7 (01-15-22 @ 22:20)  HR: 60 (22 @ 14:16) (53 - 60)  BP: 110/66 (22 @ 14:16) (107/59 - 123/69)  BP(mean): --  RR: 18 (22 @ 14:16) (16 - 18)  SpO2: 98% (22 @ 14:16) (98% - 100%)     CAPILLARY BLOOD GLUCOSE  POCT Blood Glucose.: 88 mg/dL (2022 17:24)  POCT Blood Glucose.: 89 mg/dL (2022 11:40)  POCT Blood Glucose.: 83 mg/dL (2022 08:23)  POCT Blood Glucose.: 110 mg/dL (15 Fabrizio 2022 22:14)    I&O's Summary       ==================>> LAB AND IMAGING <<==================                        11.7   6.28  )-----------( 357      ( 2022 12:26 )             36.4            142  |  108  |  16  ----------------------------<  98  3.9   |  29  |  1.15    Ca    9.9      2022 12:26  Phos  3.1       Mg     2.6         TPro  7.2  /  Alb  3.1<L>  /  TBili  0.2  /  DBili  x   /  AST  10  /  ALT  16  /  AlkPhos  77      PT/INR - ( 2022 12:26 )   PT: 13.3 sec;   INR: 1.12 ratio         PTT - ( 2022 12:26 )  PTT:30.2 sec                 Urinalysis Basic - ( 15 Fabrizio 2022 21:43 )  Color: Brown / Appearance: very cloudy / S.015 / pH: x  Gluc: x / Ketone: Negative  / Bili: Negative / Urobili: Negative   Blood: x / Protein: 500 mg/dL / Nitrite: Negative   Leuk Esterase: Small / RBC: >50 /HPF / WBC 6-10 /HPF   Sq Epi: x / Non Sq Epi: Moderate /HPF / Bacteria: Moderate /HPF    TSH:      0.39   (22)           Lipid profile:  (22)     Total: 226     LDL  : (p)     HDL  :53     TG   :126     ___________________________________________________________________________________  ===============>>  A S S E S S M E N T   A N D   P L A N <<===============  ------------------------------------------------------------------------------------------    · Assessment	  Patient is a 56yoF, w/ PMH of asthma, bladder CA (on chemo, s/p nephrostomy tube on right kidney), HTN, and HLD, p/w with left-sided chest pain and back pain. Admitted for UTI      Problem/Plan - 1:  ·  Problem: mild hemorrhagic cystitis   ·  Plan: - p/w bloody urine (h/o bloody urine w/ UTI in the past)  - last culture acinetobacter, sensitive to ceftriaxone ()  - c/w ceftriaxone  - f/u Ucx, Bcx.  - nutrition  / hydration     Problem/Plan - 2:  ·  Problem: 2019 novel coronavirus disease (COVID-19).   ·  Plan: - COVID pos  - no pulmonary sxs  - supportive care.    Problem/Plan - 3:  ·  Problem: Bradycardia.   ·  Plan: - h/o bradycardia, s/p pacemaker placement  - trop, BNP negative  - plan for battery change before       discussed with Dr Rosario     Problem/Plan - 4:  ·  Problem: Bladder cancer.   ·  Plan: - h/o bladder CA (on chemo, s/p right nephrostomy)  - no chemo due to COVID infection  - supportive care.    Problem/Plan - 5:  ·  Problem: HLD (hyperlipidemia).   ·  Plan: - h/o HLD, not on any meds  - f/u lipid panel.    Problem/Plan - 6:  ·  Problem: HTN (hypertension).   ·  Plan: - h/o HTN, not on any meds  - monitor BP  - start anti-hypertensive if indicated.    Problem/Plan - 7:  ·  Problem: Parathyroid tumor.   ·  Plan: - h/o parathyroid tumor, plan for surgical management in the future  - supportive care.    Problem/Plan - 8:  ·  Problem: Lung nodule.   ·  Plan: - h/o b/l lung nodules, being followed by pulmonologist / onc   - supportive care.    Problem/Plan - 9:  ·  Problem: Prophylactic measure.   ·  Plan: - DVT: lovenox  - GI: diet / supplements     --------------------------------------------  Case discussed with pt, HS  Education given on findings and plan of care  ___________________________  H. NIEVES Ochoa.  Pager: [phone]

## 2022-01-16 NOTE — PROGRESS NOTE ADULT - ASSESSMENT
Patient is a 56yoF, w/ PMH of asthma, bladder CA (on chemo, s/p nephrostomy tube on right kidney), HTN, and HLD, p/w with left-sided chest pain and back pain. Admitted for UTI

## 2022-01-16 NOTE — PROGRESS NOTE ADULT - SUBJECTIVE AND OBJECTIVE BOX
PGY-1 Progress Note discussed with attending    PAGER #: [[phone]] TILL 5:00 PM  PLEASE CONTACT ON CALL TEAM:  - On Call Team (Please refer to [place]) FROM 5:00 PM - 8:30PM  - Nightfloat Team FROM 8:30 -7:30 AM    INTERVAL HPI/OVERNIGHT EVENTS: no acute overnight events       REVIEW OF SYSTEMS:  CONSTITUTIONAL: No fever, weight loss, or fatigue  RESPIRATORY: No cough, wheezing, chills or hemoptysis; + shortness of breath  CARDIOVASCULAR: No chest pain, palpitations, dizziness, or leg swelling  GASTROINTESTINAL: No abdominal pain. No nausea, vomiting, or hematemesis; No diarrhea or constipation. No melena or hematochezia.  GENITOURINARY: No dysuria or hematuria, urinary frequency  NEUROLOGICAL: No headaches, memory loss, loss of strength, numbness, or tremors  SKIN: No itching, burning, rashes, or lesions     Vital Signs Last 24 Hrs  T(C): 36.7 (16 Jan 2022 14:16), Max: 36.7 (15 Fabrizio 2022 19:00)  T(F): 98 (16 Jan 2022 14:16), Max: 98.1 (15 Fabrizio 2022 22:20)  HR: 60 (16 Jan 2022 14:16) (53 - 60)  BP: 110/66 (16 Jan 2022 14:16) (107/59 - 123/69)  BP(mean): --  RR: 18 (16 Jan 2022 14:16) (16 - 18)  SpO2: 98% (16 Jan 2022 14:16) (98% - 100%)    PHYSICAL EXAMINATION:  GENERAL: NAD,   HEAD:  Atraumatic, Normocephalic  EYES:  conjunctiva and sclera clear  NECK: Supple, No JVD, Normal thyroid  CHEST/LUNG: Clear to auscultation. Clear to percussion bilaterally; No rales, rhonchi, wheezing, or rubs  HEART: Regular rate and rhythm; No murmurs, rubs, or gallops  ABDOMEN: Soft, Nontender, Nondistended; Bowel sounds present, right nephrostomy   NERVOUS SYSTEM:  Alert & Oriented X3,    EXTREMITIES:  2+ Peripheral Pulses, No clubbing, cyanosis, or edema  SKIN: warm dry                          11.7   6.28  )-----------( 357      ( 16 Jan 2022 12:26 )             36.4     01-16    142  |  108  |  16  ----------------------------<  98  3.9   |  29  |  1.15    Ca    9.9      16 Jan 2022 12:26  Phos  3.1     01-16  Mg     2.6     01-16    TPro  7.2  /  Alb  3.1<L>  /  TBili  0.2  /  DBili  x   /  AST  10  /  ALT  16  /  AlkPhos  77  01-16    LIVER FUNCTIONS - ( 16 Jan 2022 12:26 )  Alb: 3.1 g/dL / Pro: 7.2 g/dL / ALK PHOS: 77 U/L / ALT: 16 U/L DA / AST: 10 U/L / GGT: x               PT/INR - ( 16 Jan 2022 12:26 )   PT: 13.3 sec;   INR: 1.12 ratio         PTT - ( 16 Jan 2022 12:26 )  PTT:30.2 sec    CAPILLARY BLOOD GLUCOSE      RADIOLOGY & ADDITIONAL TESTS:

## 2022-01-17 LAB
ALBUMIN SERPL ELPH-MCNC: 3.7 G/DL — SIGNIFICANT CHANGE UP (ref 3.5–5)
ALP SERPL-CCNC: 86 U/L — SIGNIFICANT CHANGE UP (ref 40–120)
ALT FLD-CCNC: 18 U/L DA — SIGNIFICANT CHANGE UP (ref 10–60)
ANION GAP SERPL CALC-SCNC: 7 MMOL/L — SIGNIFICANT CHANGE UP (ref 5–17)
APTT BLD: 31.3 SEC — SIGNIFICANT CHANGE UP (ref 27.5–35.5)
AST SERPL-CCNC: 15 U/L — SIGNIFICANT CHANGE UP (ref 10–40)
BASOPHILS # BLD AUTO: 0.03 K/UL — SIGNIFICANT CHANGE UP (ref 0–0.2)
BASOPHILS NFR BLD AUTO: 0.7 % — SIGNIFICANT CHANGE UP (ref 0–2)
BILIRUB SERPL-MCNC: 0.2 MG/DL — SIGNIFICANT CHANGE UP (ref 0.2–1.2)
BUN SERPL-MCNC: 18 MG/DL — SIGNIFICANT CHANGE UP (ref 7–18)
CALCIUM SERPL-MCNC: 10.2 MG/DL — SIGNIFICANT CHANGE UP (ref 8.4–10.5)
CHLORIDE SERPL-SCNC: 106 MMOL/L — SIGNIFICANT CHANGE UP (ref 96–108)
CO2 SERPL-SCNC: 27 MMOL/L — SIGNIFICANT CHANGE UP (ref 22–31)
CREAT SERPL-MCNC: 1.15 MG/DL — SIGNIFICANT CHANGE UP (ref 0.5–1.3)
CRP SERPL-MCNC: <3 MG/L — SIGNIFICANT CHANGE UP
CULTURE RESULTS: SIGNIFICANT CHANGE UP
D DIMER BLD IA.RAPID-MCNC: <150 NG/ML DDU — SIGNIFICANT CHANGE UP
EOSINOPHIL # BLD AUTO: 0.18 K/UL — SIGNIFICANT CHANGE UP (ref 0–0.5)
EOSINOPHIL NFR BLD AUTO: 4 % — SIGNIFICANT CHANGE UP (ref 0–6)
FERRITIN SERPL-MCNC: 85 NG/ML — SIGNIFICANT CHANGE UP (ref 15–150)
GLUCOSE BLDC GLUCOMTR-MCNC: 106 MG/DL — HIGH (ref 70–99)
GLUCOSE BLDC GLUCOMTR-MCNC: 74 MG/DL — SIGNIFICANT CHANGE UP (ref 70–99)
GLUCOSE BLDC GLUCOMTR-MCNC: 79 MG/DL — SIGNIFICANT CHANGE UP (ref 70–99)
GLUCOSE BLDC GLUCOMTR-MCNC: 87 MG/DL — SIGNIFICANT CHANGE UP (ref 70–99)
GLUCOSE BLDC GLUCOMTR-MCNC: 90 MG/DL — SIGNIFICANT CHANGE UP (ref 70–99)
GLUCOSE BLDC GLUCOMTR-MCNC: 98 MG/DL — SIGNIFICANT CHANGE UP (ref 70–99)
GLUCOSE SERPL-MCNC: 135 MG/DL — HIGH (ref 70–99)
HCT VFR BLD CALC: 38.5 % — SIGNIFICANT CHANGE UP (ref 34.5–45)
HGB BLD-MCNC: 12.5 G/DL — SIGNIFICANT CHANGE UP (ref 11.5–15.5)
IMM GRANULOCYTES NFR BLD AUTO: 0.2 % — SIGNIFICANT CHANGE UP (ref 0–1.5)
INR BLD: 1.05 RATIO — SIGNIFICANT CHANGE UP (ref 0.88–1.16)
LDH SERPL L TO P-CCNC: 217 U/L — SIGNIFICANT CHANGE UP (ref 120–225)
LYMPHOCYTES # BLD AUTO: 1.62 K/UL — SIGNIFICANT CHANGE UP (ref 1–3.3)
LYMPHOCYTES # BLD AUTO: 36.3 % — SIGNIFICANT CHANGE UP (ref 13–44)
MAGNESIUM SERPL-MCNC: 2.4 MG/DL — SIGNIFICANT CHANGE UP (ref 1.6–2.6)
MCHC RBC-ENTMCNC: 29.8 PG — SIGNIFICANT CHANGE UP (ref 27–34)
MCHC RBC-ENTMCNC: 32.5 GM/DL — SIGNIFICANT CHANGE UP (ref 32–36)
MCV RBC AUTO: 91.9 FL — SIGNIFICANT CHANGE UP (ref 80–100)
MONOCYTES # BLD AUTO: 0.37 K/UL — SIGNIFICANT CHANGE UP (ref 0–0.9)
MONOCYTES NFR BLD AUTO: 8.3 % — SIGNIFICANT CHANGE UP (ref 2–14)
NEUTROPHILS # BLD AUTO: 2.25 K/UL — SIGNIFICANT CHANGE UP (ref 1.8–7.4)
NEUTROPHILS NFR BLD AUTO: 50.5 % — SIGNIFICANT CHANGE UP (ref 43–77)
NRBC # BLD: 0 /100 WBCS — SIGNIFICANT CHANGE UP (ref 0–0)
PHOSPHATE SERPL-MCNC: 2.5 MG/DL — SIGNIFICANT CHANGE UP (ref 2.5–4.5)
PLATELET # BLD AUTO: 365 K/UL — SIGNIFICANT CHANGE UP (ref 150–400)
POTASSIUM SERPL-MCNC: 3.6 MMOL/L — SIGNIFICANT CHANGE UP (ref 3.5–5.3)
POTASSIUM SERPL-SCNC: 3.6 MMOL/L — SIGNIFICANT CHANGE UP (ref 3.5–5.3)
PROCALCITONIN SERPL-MCNC: 0.02 NG/ML — SIGNIFICANT CHANGE UP (ref 0.02–0.1)
PROT SERPL-MCNC: 7.9 G/DL — SIGNIFICANT CHANGE UP (ref 6–8.3)
PROTHROM AB SERPL-ACNC: 12.5 SEC — SIGNIFICANT CHANGE UP (ref 10.6–13.6)
RBC # BLD: 4.19 M/UL — SIGNIFICANT CHANGE UP (ref 3.8–5.2)
RBC # FLD: 15.9 % — HIGH (ref 10.3–14.5)
SODIUM SERPL-SCNC: 140 MMOL/L — SIGNIFICANT CHANGE UP (ref 135–145)
SPECIMEN SOURCE: SIGNIFICANT CHANGE UP
WBC # BLD: 4.46 K/UL — SIGNIFICANT CHANGE UP (ref 3.8–10.5)
WBC # FLD AUTO: 4.46 K/UL — SIGNIFICANT CHANGE UP (ref 3.8–10.5)

## 2022-01-17 RX ORDER — CYCLOBENZAPRINE HYDROCHLORIDE 10 MG/1
5 TABLET, FILM COATED ORAL THREE TIMES A DAY
Refills: 0 | Status: DISCONTINUED | OUTPATIENT
Start: 2022-01-17 | End: 2022-01-21

## 2022-01-17 RX ORDER — CYCLOBENZAPRINE HYDROCHLORIDE 10 MG/1
5 TABLET, FILM COATED ORAL ONCE
Refills: 0 | Status: COMPLETED | OUTPATIENT
Start: 2022-01-17 | End: 2022-01-17

## 2022-01-17 RX ADMIN — CEFTRIAXONE 100 MILLIGRAM(S): 500 INJECTION, POWDER, FOR SOLUTION INTRAMUSCULAR; INTRAVENOUS at 21:55

## 2022-01-17 RX ADMIN — ENOXAPARIN SODIUM 40 MILLIGRAM(S): 100 INJECTION SUBCUTANEOUS at 11:45

## 2022-01-17 RX ADMIN — Medication 650 MILLIGRAM(S): at 05:02

## 2022-01-17 RX ADMIN — Medication 650 MILLIGRAM(S): at 12:18

## 2022-01-17 RX ADMIN — Medication 650 MILLIGRAM(S): at 11:45

## 2022-01-17 NOTE — PROGRESS NOTE ADULT - SUBJECTIVE AND OBJECTIVE BOX
PGY-1 Progress Note discussed with attending    PAGER #: [[phone]] TILL 5:00 PM  PLEASE CONTACT ON CALL TEAM:  - On Call Team (Please refer to Ainsley) FROM 5:00 PM - 8:30PM  - Nightfloat Team FROM 8:30 -7:30 AM    CHIEF COMPLAINT & BRIEF HOSPITAL COURSE:    INTERVAL HPI/OVERNIGHT EVENTS:       REVIEW OF SYSTEMS:  CONSTITUTIONAL: No fever, weight loss, or fatigue  RESPIRATORY: No cough, wheezing, chills or hemoptysis; No shortness of breath  CARDIOVASCULAR: No chest pain, palpitations, dizziness, or leg swelling  GASTROINTESTINAL: No abdominal pain. No nausea, vomiting, or hematemesis; No diarrhea or constipation. No melena or hematochezia.  GENITOURINARY: No dysuria or hematuria, urinary frequency  NEUROLOGICAL: No headaches, memory loss, loss of strength, numbness, or tremors  SKIN: No itching, burning, rashes, or lesions     Vital Signs Last 24 Hrs  T(C): 36.6 (17 Jan 2022 04:37), Max: 36.7 (16 Jan 2022 14:16)  T(F): 97.9 (17 Jan 2022 04:37), Max: 98 (16 Jan 2022 14:16)  HR: 49 (17 Jan 2022 04:37) (47 - 60)  BP: 108/61 (17 Jan 2022 04:37) (103/51 - 110/66)  BP(mean): --  RR: 18 (17 Jan 2022 04:37) (16 - 18)  SpO2: 100% (17 Jan 2022 04:37) (98% - 100%)    PHYSICAL EXAMINATION:  GENERAL: NAD, well built  HEAD:  Atraumatic, Normocephalic  EYES:  conjunctiva and sclera clear  NECK: Supple, No JVD, Normal thyroid  CHEST/LUNG: Clear to auscultation. Clear to percussion bilaterally; No rales, rhonchi, wheezing, or rubs  HEART: Regular rate and rhythm; No murmurs, rubs, or gallops  ABDOMEN: Soft, Nontender, Nondistended; Bowel sounds present  NERVOUS SYSTEM:  Alert & Oriented X3,    EXTREMITIES:  2+ Peripheral Pulses, No clubbing, cyanosis, or edema  SKIN: warm dry                          11.7   6.28  )-----------( 357      ( 16 Jan 2022 12:26 )             36.4     01-16    142  |  108  |  16  ----------------------------<  98  3.9   |  29  |  1.15    Ca    9.9      16 Jan 2022 12:26  Phos  3.1     01-16  Mg     2.6     01-16    TPro  7.2  /  Alb  3.1<L>  /  TBili  0.2  /  DBili  x   /  AST  10  /  ALT  16  /  AlkPhos  77  01-16    LIVER FUNCTIONS - ( 16 Jan 2022 12:26 )  Alb: 3.1 g/dL / Pro: 7.2 g/dL / ALK PHOS: 77 U/L / ALT: 16 U/L DA / AST: 10 U/L / GGT: x               PT/INR - ( 16 Jan 2022 12:26 )   PT: 13.3 sec;   INR: 1.12 ratio         PTT - ( 16 Jan 2022 12:26 )  PTT:30.2 sec    CAPILLARY BLOOD GLUCOSE      RADIOLOGY & ADDITIONAL TESTS:                   PGY-1 Progress Note discussed with attending    PAGER #: [[phone]] TILL 5:00 PM  PLEASE CONTACT ON CALL TEAM:  - On Call Team (Please refer to Ainsley) FROM 5:00 PM - 8:30PM  - Nightfloat Team FROM 8:30 -7:30 AM        INTERVAL HPI/OVERNIGHT EVENTS: Pt c/o neck pain that extends to right shoulder, spasmodic like in nature, also c/o nausea. Last BM yesterday.       REVIEW OF SYSTEMS:  CONSTITUTIONAL: No fever, weight loss, or fatigue  RESPIRATORY: No cough, wheezing, chills or hemoptysis; No shortness of breath  CARDIOVASCULAR: No chest pain, palpitations, dizziness, or leg swelling  GASTROINTESTINAL: No abdominal pain. No nausea, vomiting, or hematemesis; No diarrhea, (+) constipation. No melena or hematochezia.  GENITOURINARY: No dysuria or hematuria, urinary frequency  NEUROLOGICAL: No headaches, memory loss, loss of strength, numbness, or tremors  SKIN: No itching, burning, rashes, or lesions     Vital Signs Last 24 Hrs  T(C): 36.6 (17 Jan 2022 04:37), Max: 36.7 (16 Jan 2022 14:16)  T(F): 97.9 (17 Jan 2022 04:37), Max: 98 (16 Jan 2022 14:16)  HR: 49 (17 Jan 2022 04:37) (47 - 60)  BP: 108/61 (17 Jan 2022 04:37) (103/51 - 110/66)  BP(mean): --  RR: 18 (17 Jan 2022 04:37) (16 - 18)  SpO2: 100% (17 Jan 2022 04:37) (98% - 100%)    PHYSICAL EXAMINATION:  GENERAL: NAD, thin  HEAD:  Atraumatic, Normocephalic  EYES:  conjunctiva and sclera clear  NECK: Supple, No JVD  CHEST/LUNG: Clear to auscultation. Clear to percussion bilaterally; No rales, rhonchi, wheezing, or rubs  HEART: Regular rate and rhythm; No murmurs, rubs, or gallops  ABDOMEN: Soft, Nontender, Nondistended; Bowel sounds present. Nephrostomy tube on the R flank, functioning, draining turbid urine  NERVOUS SYSTEM:  Alert & Oriented X3,    EXTREMITIES:  2+ Peripheral Pulses, No clubbing, cyanosis, or edema  SKIN: warm dry                          11.7   6.28  )-----------( 357      ( 16 Jan 2022 12:26 )             36.4     01-16    142  |  108  |  16  ----------------------------<  98  3.9   |  29  |  1.15    Ca    9.9      16 Jan 2022 12:26  Phos  3.1     01-16  Mg     2.6     01-16    TPro  7.2  /  Alb  3.1<L>  /  TBili  0.2  /  DBili  x   /  AST  10  /  ALT  16  /  AlkPhos  77  01-16    LIVER FUNCTIONS - ( 16 Jan 2022 12:26 )  Alb: 3.1 g/dL / Pro: 7.2 g/dL / ALK PHOS: 77 U/L / ALT: 16 U/L DA / AST: 10 U/L / GGT: x               PT/INR - ( 16 Jan 2022 12:26 )   PT: 13.3 sec;   INR: 1.12 ratio         PTT - ( 16 Jan 2022 12:26 )  PTT:30.2 sec    CAPILLARY BLOOD GLUCOSE      RADIOLOGY & ADDITIONAL TESTS:

## 2022-01-18 LAB
ALBUMIN SERPL ELPH-MCNC: 3.1 G/DL — LOW (ref 3.5–5)
ALP SERPL-CCNC: 83 U/L — SIGNIFICANT CHANGE UP (ref 40–120)
ALT FLD-CCNC: 16 U/L DA — SIGNIFICANT CHANGE UP (ref 10–60)
ANION GAP SERPL CALC-SCNC: 8 MMOL/L — SIGNIFICANT CHANGE UP (ref 5–17)
AST SERPL-CCNC: 14 U/L — SIGNIFICANT CHANGE UP (ref 10–40)
BILIRUB SERPL-MCNC: 0.3 MG/DL — SIGNIFICANT CHANGE UP (ref 0.2–1.2)
BUN SERPL-MCNC: 15 MG/DL — SIGNIFICANT CHANGE UP (ref 7–18)
CALCIUM SERPL-MCNC: 10.4 MG/DL — SIGNIFICANT CHANGE UP (ref 8.4–10.5)
CHLORIDE SERPL-SCNC: 106 MMOL/L — SIGNIFICANT CHANGE UP (ref 96–108)
CO2 SERPL-SCNC: 27 MMOL/L — SIGNIFICANT CHANGE UP (ref 22–31)
CREAT SERPL-MCNC: 1.09 MG/DL — SIGNIFICANT CHANGE UP (ref 0.5–1.3)
GLUCOSE BLDC GLUCOMTR-MCNC: 120 MG/DL — HIGH (ref 70–99)
GLUCOSE BLDC GLUCOMTR-MCNC: 60 MG/DL — LOW (ref 70–99)
GLUCOSE BLDC GLUCOMTR-MCNC: 79 MG/DL — SIGNIFICANT CHANGE UP (ref 70–99)
GLUCOSE BLDC GLUCOMTR-MCNC: 88 MG/DL — SIGNIFICANT CHANGE UP (ref 70–99)
GLUCOSE BLDC GLUCOMTR-MCNC: 93 MG/DL — SIGNIFICANT CHANGE UP (ref 70–99)
GLUCOSE SERPL-MCNC: 81 MG/DL — SIGNIFICANT CHANGE UP (ref 70–99)
HCT VFR BLD CALC: 38.5 % — SIGNIFICANT CHANGE UP (ref 34.5–45)
HGB BLD-MCNC: 12.3 G/DL — SIGNIFICANT CHANGE UP (ref 11.5–15.5)
MAGNESIUM SERPL-MCNC: 2.4 MG/DL — SIGNIFICANT CHANGE UP (ref 1.6–2.6)
MCHC RBC-ENTMCNC: 29.6 PG — SIGNIFICANT CHANGE UP (ref 27–34)
MCHC RBC-ENTMCNC: 31.9 GM/DL — LOW (ref 32–36)
MCV RBC AUTO: 92.5 FL — SIGNIFICANT CHANGE UP (ref 80–100)
NRBC # BLD: 0 /100 WBCS — SIGNIFICANT CHANGE UP (ref 0–0)
PHOSPHATE SERPL-MCNC: 3.3 MG/DL — SIGNIFICANT CHANGE UP (ref 2.5–4.5)
PLATELET # BLD AUTO: 333 K/UL — SIGNIFICANT CHANGE UP (ref 150–400)
POTASSIUM SERPL-MCNC: 4.7 MMOL/L — SIGNIFICANT CHANGE UP (ref 3.5–5.3)
POTASSIUM SERPL-SCNC: 4.7 MMOL/L — SIGNIFICANT CHANGE UP (ref 3.5–5.3)
PROT SERPL-MCNC: 7.6 G/DL — SIGNIFICANT CHANGE UP (ref 6–8.3)
RBC # BLD: 4.16 M/UL — SIGNIFICANT CHANGE UP (ref 3.8–5.2)
RBC # FLD: 15.6 % — HIGH (ref 10.3–14.5)
SARS-COV-2 RNA SPEC QL NAA+PROBE: DETECTED
SODIUM SERPL-SCNC: 141 MMOL/L — SIGNIFICANT CHANGE UP (ref 135–145)
WBC # BLD: 4.45 K/UL — SIGNIFICANT CHANGE UP (ref 3.8–10.5)
WBC # FLD AUTO: 4.45 K/UL — SIGNIFICANT CHANGE UP (ref 3.8–10.5)

## 2022-01-18 RX ADMIN — ENOXAPARIN SODIUM 40 MILLIGRAM(S): 100 INJECTION SUBCUTANEOUS at 14:15

## 2022-01-18 RX ADMIN — CEFTRIAXONE 100 MILLIGRAM(S): 500 INJECTION, POWDER, FOR SOLUTION INTRAMUSCULAR; INTRAVENOUS at 23:52

## 2022-01-18 NOTE — PROGRESS NOTE ADULT - SUBJECTIVE AND OBJECTIVE BOX
PGY-1 Progress Note discussed with attending    PAGER #: [[phone]] TILL 5:00 PM  PLEASE CONTACT ON CALL TEAM:  - On Call Team (Please refer to Ainsley) FROM 5:00 PM - 8:30PM  - Nightfloat Team FROM 8:30 -7:30 AM      INTERVAL HPI/OVERNIGHT EVENTS:  Pt seen and examined at bedside, c/o dizziness.      REVIEW OF SYSTEMS:  CONSTITUTIONAL: No fever, weight loss, or fatigue  RESPIRATORY: No cough, wheezing, chills or hemoptysis; No shortness of breath  CARDIOVASCULAR: No chest pain, palpitations, dizziness, or leg swelling  GASTROINTESTINAL: No abdominal pain. No nausea, vomiting, or hematemesis; No diarrhea or constipation. No melena or hematochezia.  GENITOURINARY: No dysuria or hematuria, urinary frequency  NEUROLOGICAL: No headaches, memory loss, loss of strength, numbness, or tremors, + dizziness   SKIN: No itching, burning, rashes, or lesions     Vital Signs Last 24 Hrs  T(C): 36.6 (18 Jan 2022 06:00), Max: 37 (17 Jan 2022 14:30)  T(F): 97.8 (18 Jan 2022 06:00), Max: 98.6 (17 Jan 2022 14:30)  HR: 55 (18 Jan 2022 06:00) (50 - 56)  BP: 128/53 (18 Jan 2022 06:00) (94/61 - 128/53)  RR: 18 (18 Jan 2022 06:00) (18 - 18)  SpO2: 100% (18 Jan 2022 06:00) (96% - 100%)    PHYSICAL EXAMINATION:  GENERAL: NAD, thin  HEAD:  Atraumatic, Normocephalic  EYES:  conjunctiva and sclera clear  NECK: Supple, No JVD  CHEST/LUNG: Clear to auscultation. Clear to percussion bilaterally; No rales, rhonchi, wheezing, or rubs  HEART: Regular rate and rhythm; No murmurs, rubs, or gallops  ABDOMEN: Soft, Nontender, Nondistended; Bowel sounds present. Nephrostomy tube on the R flank, functioning, draining  urine  NERVOUS SYSTEM:  Alert & Oriented X3,    EXTREMITIES:  2+ Peripheral Pulses, No clubbing, cyanosis, or edema  SKIN: warm dry                        12.3   4.45  )-----------( 333      ( 18 Jan 2022 06:45 )             38.5     01-18    141  |  106  |  15  ----------------------------<  81  4.7   |  27  |  1.09    Ca    10.4      18 Jan 2022 06:45  Phos  3.3     01-18  Mg     2.4     01-18    TPro  7.6  /  Alb  3.1<L>  /  TBili  0.3  /  DBili  x   /  AST  14  /  ALT  16  /  AlkPhos  83  01-18    LIVER FUNCTIONS - ( 18 Jan 2022 06:45 )  Alb: 3.1 g/dL / Pro: 7.6 g/dL / ALK PHOS: 83 U/L / ALT: 16 U/L DA / AST: 14 U/L / GGT: x               PT/INR - ( 17 Jan 2022 11:17 )   PT: 12.5 sec;   INR: 1.05 ratio         PTT - ( 17 Jan 2022 11:17 )  PTT:31.3 sec    CAPILLARY BLOOD GLUCOSE      RADIOLOGY & ADDITIONAL TESTS:

## 2022-01-19 LAB
ALBUMIN SERPL ELPH-MCNC: 3.1 G/DL — LOW (ref 3.5–5)
ALP SERPL-CCNC: 81 U/L — SIGNIFICANT CHANGE UP (ref 40–120)
ALT FLD-CCNC: 16 U/L DA — SIGNIFICANT CHANGE UP (ref 10–60)
ANION GAP SERPL CALC-SCNC: 6 MMOL/L — SIGNIFICANT CHANGE UP (ref 5–17)
AST SERPL-CCNC: 13 U/L — SIGNIFICANT CHANGE UP (ref 10–40)
BASOPHILS # BLD AUTO: 0.02 K/UL — SIGNIFICANT CHANGE UP (ref 0–0.2)
BASOPHILS NFR BLD AUTO: 0.5 % — SIGNIFICANT CHANGE UP (ref 0–2)
BILIRUB SERPL-MCNC: 0.2 MG/DL — SIGNIFICANT CHANGE UP (ref 0.2–1.2)
BUN SERPL-MCNC: 22 MG/DL — HIGH (ref 7–18)
CALCIUM SERPL-MCNC: 10.1 MG/DL — SIGNIFICANT CHANGE UP (ref 8.4–10.5)
CHLORIDE SERPL-SCNC: 105 MMOL/L — SIGNIFICANT CHANGE UP (ref 96–108)
CO2 SERPL-SCNC: 29 MMOL/L — SIGNIFICANT CHANGE UP (ref 22–31)
CREAT SERPL-MCNC: 1.05 MG/DL — SIGNIFICANT CHANGE UP (ref 0.5–1.3)
EOSINOPHIL # BLD AUTO: 0.18 K/UL — SIGNIFICANT CHANGE UP (ref 0–0.5)
EOSINOPHIL NFR BLD AUTO: 4.2 % — SIGNIFICANT CHANGE UP (ref 0–6)
GLUCOSE BLDC GLUCOMTR-MCNC: 100 MG/DL — HIGH (ref 70–99)
GLUCOSE BLDC GLUCOMTR-MCNC: 131 MG/DL — HIGH (ref 70–99)
GLUCOSE BLDC GLUCOMTR-MCNC: 78 MG/DL — SIGNIFICANT CHANGE UP (ref 70–99)
GLUCOSE BLDC GLUCOMTR-MCNC: 90 MG/DL — SIGNIFICANT CHANGE UP (ref 70–99)
GLUCOSE SERPL-MCNC: 78 MG/DL — SIGNIFICANT CHANGE UP (ref 70–99)
HCT VFR BLD CALC: 36.4 % — SIGNIFICANT CHANGE UP (ref 34.5–45)
HGB BLD-MCNC: 11.7 G/DL — SIGNIFICANT CHANGE UP (ref 11.5–15.5)
IMM GRANULOCYTES NFR BLD AUTO: 0.2 % — SIGNIFICANT CHANGE UP (ref 0–1.5)
LYMPHOCYTES # BLD AUTO: 1.7 K/UL — SIGNIFICANT CHANGE UP (ref 1–3.3)
LYMPHOCYTES # BLD AUTO: 39.7 % — SIGNIFICANT CHANGE UP (ref 13–44)
MAGNESIUM SERPL-MCNC: 2.5 MG/DL — SIGNIFICANT CHANGE UP (ref 1.6–2.6)
MCHC RBC-ENTMCNC: 29.8 PG — SIGNIFICANT CHANGE UP (ref 27–34)
MCHC RBC-ENTMCNC: 32.1 GM/DL — SIGNIFICANT CHANGE UP (ref 32–36)
MCV RBC AUTO: 92.9 FL — SIGNIFICANT CHANGE UP (ref 80–100)
MONOCYTES # BLD AUTO: 0.33 K/UL — SIGNIFICANT CHANGE UP (ref 0–0.9)
MONOCYTES NFR BLD AUTO: 7.7 % — SIGNIFICANT CHANGE UP (ref 2–14)
NEUTROPHILS # BLD AUTO: 2.04 K/UL — SIGNIFICANT CHANGE UP (ref 1.8–7.4)
NEUTROPHILS NFR BLD AUTO: 47.7 % — SIGNIFICANT CHANGE UP (ref 43–77)
NRBC # BLD: 0 /100 WBCS — SIGNIFICANT CHANGE UP (ref 0–0)
PHOSPHATE SERPL-MCNC: 3.2 MG/DL — SIGNIFICANT CHANGE UP (ref 2.5–4.5)
PLATELET # BLD AUTO: 314 K/UL — SIGNIFICANT CHANGE UP (ref 150–400)
POTASSIUM SERPL-MCNC: 4.2 MMOL/L — SIGNIFICANT CHANGE UP (ref 3.5–5.3)
POTASSIUM SERPL-SCNC: 4.2 MMOL/L — SIGNIFICANT CHANGE UP (ref 3.5–5.3)
PROT SERPL-MCNC: 7.3 G/DL — SIGNIFICANT CHANGE UP (ref 6–8.3)
RBC # BLD: 3.92 M/UL — SIGNIFICANT CHANGE UP (ref 3.8–5.2)
RBC # FLD: 15.4 % — HIGH (ref 10.3–14.5)
SODIUM SERPL-SCNC: 140 MMOL/L — SIGNIFICANT CHANGE UP (ref 135–145)
WBC # BLD: 4.28 K/UL — SIGNIFICANT CHANGE UP (ref 3.8–10.5)
WBC # FLD AUTO: 4.28 K/UL — SIGNIFICANT CHANGE UP (ref 3.8–10.5)

## 2022-01-19 RX ADMIN — ENOXAPARIN SODIUM 40 MILLIGRAM(S): 100 INJECTION SUBCUTANEOUS at 12:18

## 2022-01-19 RX ADMIN — Medication 650 MILLIGRAM(S): at 06:50

## 2022-01-19 NOTE — DIETITIAN INITIAL EVALUATION ADULT. - ADD RECOMMEND
Severe Malnutrition identified; MD to consider Bowel Rx as medically feasible; Contacted pt via phone

## 2022-01-19 NOTE — DIETITIAN INITIAL EVALUATION ADULT. - PERTINENT MEDS FT
MEDICATIONS  (STANDING):  cefTRIAXone   IVPB 1000 milliGRAM(s) IV Intermittent every 24 hours  cefTRIAXone   IVPB      dextrose 5%. 1000 milliLiter(s) (50 mL/Hr) IV Continuous <Continuous>  dextrose 5%. 1000 milliLiter(s) (100 mL/Hr) IV Continuous <Continuous>  dextrose 50% Injectable 12.5 Gram(s) IV Push once  enoxaparin Injectable 40 milliGRAM(s) SubCutaneous daily  glucagon  Injectable 1 milliGRAM(s) IntraMuscular once  insulin lispro (ADMELOG) corrective regimen sliding scale   SubCutaneous Before meals and at bedtime

## 2022-01-19 NOTE — PROGRESS NOTE ADULT - ATTENDING COMMENTS
Seen and examined this morning . I feel dizzy and worried about low heart rate at night . spoke to Dr Mcguire and said unless COVID negative cant transfer her for batter change.
Seen and examined earlier today . I feel fine . On 24th I have to take my daughter for IG infusion . She does need her PPM battery changed this admission. Will call infection control if opt can be taken off isolation so can be transferred to [facility] .
Seen and examined earlier today . Last night my HR was very low . My left side neck is hurting also but denies any chest pain or any other symptom . Urine C/S negative so far . Will DC abxs if continues to be negative. Has no fever ,Leucocytosis . Will need Urology consultation . For Bradycardia will consult her Cardiologist Dr Leslee Mcguire .

## 2022-01-19 NOTE — DIETITIAN INITIAL EVALUATION ADULT. - PROBLEM SELECTOR PLAN 4
- h/o bladder CA (on chemo, s/p right nephrostomy)  - no chemo due to COVID infection  - supportive care plan per MD

## 2022-01-19 NOTE — DIETITIAN INITIAL EVALUATION ADULT. - FACTORS AFF FOOD INTAKE
acute on chronic comorbidities including cancer with chemotherapy, Covid19 infection,/Oriental orthodox/ethnic/cultural/personal food preferences

## 2022-01-19 NOTE — DIETITIAN INITIAL EVALUATION ADULT. - ORAL INTAKE PTA/DIET HISTORY
Severe Malnutrition identified 7/22/21 by RD at previous admission, usual qw=118 lb 2020, wt down to 80 lb, then regained to 98 lb 8/31/21, also pt dislikes Ensure Enlive, was trying Glucerna shake then per chart review

## 2022-01-19 NOTE — DIETITIAN INITIAL EVALUATION ADULT. - OTHER INFO
Pt lives home PTA, alert, oriented, COVID19+aileen, in airborne/contact isolation room, unable to conduct a face to face interview due to limited contact restrictions related to pt's medical condition and isolation precautions., contacted pt via phone by Dietetic Intern ( at [phone]), well-communicated;     Reported appetitive okay PTA, diarrhea PTA (none at present) constipation x 3d, intermittent nausea, varied intake depending on food served, extensive food preferences obtained and forwarded to Dietary, dislikes Ensure Plant Based and Prosource; wt loss reported, but unclear details ( see below); episode of hypoglycemia noted, on Finger stick range=60 10 106, will recommend to d/c DM modification; Pending transfer to [facility] for pacemaker battery change per team when Covid19 -aileen

## 2022-01-19 NOTE — DIETITIAN INITIAL EVALUATION ADULT. - ETIOLOGY
acute on chronic comorbidities including cancer with chemotherapy, Covid19 infection; cultural/ethnical/individual food preferences

## 2022-01-19 NOTE — DIETITIAN INITIAL EVALUATION ADULT. - REASON
Nutrition focused physical exam deferred at this time 2/2 precautionary measures surrounding the current COVID-19 pandemic

## 2022-01-19 NOTE — DIETITIAN INITIAL EVALUATION ADULT. - PROBLEM SELECTOR PLAN 1
- p/w bloody urine (h/o bloody urine w/ UTI in the past)  - U/A pos  - last culture acinetobacter, sensitive to ceftriaxone (Sept2021)  - c/w ceftriaxone  - f/u Ucx, Bcx plan per MD

## 2022-01-19 NOTE — DIETITIAN NUTRITION RISK NOTIFICATION - TREATMENT: THE FOLLOWING DIET HAS BEEN RECOMMENDED
Diet, Regular:   No Beef  No Caffeine  No Dairy  No Pork  No Tomatoes  Supplement Feeding Modality:  Oral  Ensure Clear Cans or Servings Per Day:  1       Frequency:  Three Times a day (01-19-22 @ 10:08) [Pending Verification By Attending]

## 2022-01-19 NOTE — DIETITIAN INITIAL EVALUATION ADULT. - PROBLEM SELECTOR PLAN 3
- h/o bradycardia, s/p pacemaker placement  - plan for battery change in the near future plan per MD

## 2022-01-19 NOTE — DIETITIAN INITIAL EVALUATION ADULT. - PERTINENT LABORATORY DATA
01-19 Na140 mmol/L Glu 78 mg/dL K+ 4.2 mmol/L Cr  1.05 mg/dL BUN 22 mg/dL<H>   01-19 Phos 3.2 mg/dL   01-19 Alb 3.1 g/dL<L>       01-16 Chol 226 mg/dL<H> LDL --    HDL 53 mg/dL Trig 126 mg/dL  01-16-22 @ 18:39 HgbA1C 5.5 [4.0 - 5.6]

## 2022-01-19 NOTE — PROGRESS NOTE ADULT - SUBJECTIVE AND OBJECTIVE BOX
PGY-1 Progress Note discussed with attending    PAGER #: [[phone]] TILL 5:00 PM  PLEASE CONTACT ON CALL TEAM:  - On Call Team (Please refer to Ainsley) FROM 5:00 PM - 8:30PM  - Nightfloat Team FROM 8:30 -7:30 AM    CHIEF COMPLAINT & BRIEF HOSPITAL COURSE:    INTERVAL HPI/OVERNIGHT EVENTS:       REVIEW OF SYSTEMS:  CONSTITUTIONAL: No fever, weight loss, or fatigue  RESPIRATORY: No cough, wheezing, chills or hemoptysis; No shortness of breath  CARDIOVASCULAR: No chest pain, palpitations, dizziness, or leg swelling  GASTROINTESTINAL: No abdominal pain. No nausea, vomiting, or hematemesis; No diarrhea or constipation. No melena or hematochezia.  GENITOURINARY: No dysuria or hematuria, urinary frequency  NEUROLOGICAL: No headaches, memory loss, loss of strength, numbness, or tremors  SKIN: No itching, burning, rashes, or lesions     Vital Signs Last 24 Hrs  T(C): 36.9 (19 Jan 2022 05:16), Max: 36.9 (19 Jan 2022 05:16)  T(F): 98.4 (19 Jan 2022 05:16), Max: 98.4 (19 Jan 2022 05:16)  HR: 58 (19 Jan 2022 05:16) (53 - 63)  BP: 106/70 (19 Jan 2022 05:16) (106/70 - 113/64)  BP(mean): --  RR: 18 (19 Jan 2022 05:16) (18 - 18)  SpO2: 99% (19 Jan 2022 05:16) (98% - 100%)    PHYSICAL EXAMINATION:  GENERAL: NAD, well built  HEAD:  Atraumatic, Normocephalic  EYES:  conjunctiva and sclera clear  NECK: Supple, No JVD, Normal thyroid  CHEST/LUNG: Clear to auscultation. Clear to percussion bilaterally; No rales, rhonchi, wheezing, or rubs  HEART: Regular rate and rhythm; No murmurs, rubs, or gallops  ABDOMEN: Soft, Nontender, Nondistended; Bowel sounds present  NERVOUS SYSTEM:  Alert & Oriented X3,    EXTREMITIES:  2+ Peripheral Pulses, No clubbing, cyanosis, or edema  SKIN: warm dry                          11.7   4.28  )-----------( 314      ( 19 Jan 2022 07:10 )             36.4     01-19    140  |  105  |  22<H>  ----------------------------<  78  4.2   |  29  |  1.05    Ca    10.1      19 Jan 2022 07:10  Phos  3.2     01-19  Mg     2.5     01-19    TPro  7.3  /  Alb  3.1<L>  /  TBili  0.2  /  DBili  x   /  AST  13  /  ALT  16  /  AlkPhos  81  01-19    LIVER FUNCTIONS - ( 19 Jan 2022 07:10 )  Alb: 3.1 g/dL / Pro: 7.3 g/dL / ALK PHOS: 81 U/L / ALT: 16 U/L DA / AST: 13 U/L / GGT: x                   CAPILLARY BLOOD GLUCOSE      RADIOLOGY & ADDITIONAL TESTS:                   PGY-1 Progress Note discussed with attending    PAGER #: [[phone]] TILL 5:00 PM  PLEASE CONTACT ON CALL TEAM:  - On Call Team (Please refer to Ainsley) FROM 5:00 PM - 8:30PM  - Nightfloat Team FROM 8:30 -7:30 AM        INTERVAL HPI/OVERNIGHT EVENTS: No overnight events. Pt c/o dizziness and fluctuating BG levels.       REVIEW OF SYSTEMS:  CONSTITUTIONAL: No fever, weight loss, or fatigue  RESPIRATORY: No cough, wheezing, chills or hemoptysis; No shortness of breath  CARDIOVASCULAR: No chest pain, palpitations,(+) dizziness, or leg swelling  GASTROINTESTINAL: No abdominal pain. No nausea, vomiting, or hematemesis; No diarrhea or constipation. No melena or hematochezia.  GENITOURINARY: No dysuria or hematuria, urinary frequency  NEUROLOGICAL: No headaches, memory loss, loss of strength, numbness, or tremors  SKIN: No itching, burning, rashes, or lesions     Vital Signs Last 24 Hrs  T(C): 36.9 (19 Jan 2022 05:16), Max: 36.9 (19 Jan 2022 05:16)  T(F): 98.4 (19 Jan 2022 05:16), Max: 98.4 (19 Jan 2022 05:16)  HR: 58 (19 Jan 2022 05:16) (53 - 63)  BP: 106/70 (19 Jan 2022 05:16) (106/70 - 113/64)  BP(mean): --  RR: 18 (19 Jan 2022 05:16) (18 - 18)  SpO2: 99% (19 Jan 2022 05:16) (98% - 100%)    PHYSICAL EXAMINATION:  GENERAL: NAD, thin  HEAD:  Atraumatic, Normocephalic  EYES:  conjunctiva and sclera clear  NECK: Supple, No JVD  CHEST/LUNG: Clear to auscultation. Clear to percussion bilaterally; No rales, rhonchi, wheezing, or rubs  HEART: Regular rate and rhythm; No murmurs, rubs, or gallops  ABDOMEN: Soft, Nontender, Nondistended; Bowel sounds present. Nephrostomy tube on the R flank, functioning, draining clear urine  NERVOUS SYSTEM:  Alert & Oriented X3,    EXTREMITIES:  2+ Peripheral Pulses, No clubbing, cyanosis, or edema  SKIN: warm dry                                   11.7   4.28  )-----------( 314      ( 19 Jan 2022 07:10 )             36.4     01-19    140  |  105  |  22<H>  ----------------------------<  78  4.2   |  29  |  1.05    Ca    10.1      19 Jan 2022 07:10  Phos  3.2     01-19  Mg     2.5     01-19    TPro  7.3  /  Alb  3.1<L>  /  TBili  0.2  /  DBili  x   /  AST  13  /  ALT  16  /  AlkPhos  81  01-19    LIVER FUNCTIONS - ( 19 Jan 2022 07:10 )  Alb: 3.1 g/dL / Pro: 7.3 g/dL / ALK PHOS: 81 U/L / ALT: 16 U/L DA / AST: 13 U/L / GGT: x                   CAPILLARY BLOOD GLUCOSE      RADIOLOGY & ADDITIONAL TESTS:

## 2022-01-19 NOTE — DIETITIAN NUTRITION RISK NOTIFICATION - ADDITIONAL COMMENTS/DIETITIAN RECOMMENDATIONS
Malnutrition Diagnosis Parameters: unintended wt loss 20% x 1 to 2y, cachectic, BMI=17.6, varied intake

## 2022-01-20 ENCOUNTER — TRANSCRIPTION ENCOUNTER (OUTPATIENT)
Age: 57
End: 2022-01-20

## 2022-01-20 LAB
ALBUMIN SERPL ELPH-MCNC: 3.1 G/DL — LOW (ref 3.5–5)
ALP SERPL-CCNC: 85 U/L — SIGNIFICANT CHANGE UP (ref 40–120)
ALT FLD-CCNC: 17 U/L DA — SIGNIFICANT CHANGE UP (ref 10–60)
ANION GAP SERPL CALC-SCNC: 5 MMOL/L — SIGNIFICANT CHANGE UP (ref 5–17)
AST SERPL-CCNC: 12 U/L — SIGNIFICANT CHANGE UP (ref 10–40)
BILIRUB SERPL-MCNC: 0.2 MG/DL — SIGNIFICANT CHANGE UP (ref 0.2–1.2)
BUN SERPL-MCNC: 21 MG/DL — HIGH (ref 7–18)
CALCIUM SERPL-MCNC: 10.3 MG/DL — SIGNIFICANT CHANGE UP (ref 8.4–10.5)
CHLORIDE SERPL-SCNC: 105 MMOL/L — SIGNIFICANT CHANGE UP (ref 96–108)
CO2 SERPL-SCNC: 30 MMOL/L — SIGNIFICANT CHANGE UP (ref 22–31)
CREAT SERPL-MCNC: 1.08 MG/DL — SIGNIFICANT CHANGE UP (ref 0.5–1.3)
GLUCOSE BLDC GLUCOMTR-MCNC: 71 MG/DL — SIGNIFICANT CHANGE UP (ref 70–99)
GLUCOSE BLDC GLUCOMTR-MCNC: 72 MG/DL — SIGNIFICANT CHANGE UP (ref 70–99)
GLUCOSE BLDC GLUCOMTR-MCNC: 96 MG/DL — SIGNIFICANT CHANGE UP (ref 70–99)
GLUCOSE BLDC GLUCOMTR-MCNC: 97 MG/DL — SIGNIFICANT CHANGE UP (ref 70–99)
GLUCOSE SERPL-MCNC: 82 MG/DL — SIGNIFICANT CHANGE UP (ref 70–99)
HCT VFR BLD CALC: 37.2 % — SIGNIFICANT CHANGE UP (ref 34.5–45)
HGB BLD-MCNC: 12.1 G/DL — SIGNIFICANT CHANGE UP (ref 11.5–15.5)
MAGNESIUM SERPL-MCNC: 2.3 MG/DL — SIGNIFICANT CHANGE UP (ref 1.6–2.6)
MCHC RBC-ENTMCNC: 29.5 PG — SIGNIFICANT CHANGE UP (ref 27–34)
MCHC RBC-ENTMCNC: 32.5 GM/DL — SIGNIFICANT CHANGE UP (ref 32–36)
MCV RBC AUTO: 90.7 FL — SIGNIFICANT CHANGE UP (ref 80–100)
NRBC # BLD: 0 /100 WBCS — SIGNIFICANT CHANGE UP (ref 0–0)
PHOSPHATE SERPL-MCNC: 3.3 MG/DL — SIGNIFICANT CHANGE UP (ref 2.5–4.5)
PLATELET # BLD AUTO: 296 K/UL — SIGNIFICANT CHANGE UP (ref 150–400)
POTASSIUM SERPL-MCNC: 4.7 MMOL/L — SIGNIFICANT CHANGE UP (ref 3.5–5.3)
POTASSIUM SERPL-SCNC: 4.7 MMOL/L — SIGNIFICANT CHANGE UP (ref 3.5–5.3)
PROT SERPL-MCNC: 7.3 G/DL — SIGNIFICANT CHANGE UP (ref 6–8.3)
RBC # BLD: 4.1 M/UL — SIGNIFICANT CHANGE UP (ref 3.8–5.2)
RBC # FLD: 15.4 % — HIGH (ref 10.3–14.5)
SODIUM SERPL-SCNC: 140 MMOL/L — SIGNIFICANT CHANGE UP (ref 135–145)
WBC # BLD: 4.71 K/UL — SIGNIFICANT CHANGE UP (ref 3.8–10.5)
WBC # FLD AUTO: 4.71 K/UL — SIGNIFICANT CHANGE UP (ref 3.8–10.5)

## 2022-01-20 RX ADMIN — Medication 650 MILLIGRAM(S): at 15:12

## 2022-01-20 RX ADMIN — ENOXAPARIN SODIUM 40 MILLIGRAM(S): 100 INJECTION SUBCUTANEOUS at 12:10

## 2022-01-20 RX ADMIN — Medication 650 MILLIGRAM(S): at 17:30

## 2022-01-20 NOTE — PROGRESS NOTE ADULT - PROBLEM SELECTOR PLAN 9
- DVT: lovenox  - GI: none
- DVT: lovenox (on hold) in anticipation for procedure tomorrow by IR  - GI: none
- DVT: lovenox  - GI: none

## 2022-01-20 NOTE — DISCHARGE NOTE PROVIDER - PROVIDER TOKENS
FREE:[LAST:[edda],FIRST:[melvin],PHONE:[(   )    -],FAX:[(   )    -],ADDRESS:[[address] [place] [address], [address]SCHEDULEDAPPT:[01/31/2022],SCHEDULEDAPPTTIME:[11:00 AM]] FREE:[LAST:[golyan],FIRST:[melvin],PHONE:[(   )    -],FAX:[(   )    -],ADDRESS:[[address] [place] [address], [address]],SCHEDULEDAPPT:[01/31/2022],SCHEDULEDAPPTTIME:[11:00 AM]],FREE:[LAST:[interventional],FIRST:[radiology],PHONE:[(   )    -],FAX:[(   )    -],FOLLOWUP:[1 week],ESTABLISHEDPATIENT:[T]]

## 2022-01-20 NOTE — PROGRESS NOTE ADULT - SUBJECTIVE AND OBJECTIVE BOX
Patient for a nephrostomy tube exchange in IR tomorrow.  Please keep patient NPO, send early AM labs including CBC, BMP, and PT, INR / PTT.  Hold all anticoagulation, NSAIDS, and antiplatelet medication.

## 2022-01-20 NOTE — DISCHARGE NOTE PROVIDER - NSDCCAREPROVSEEN_GEN_ALL_CORE_FT
Julio, Jaz Koo, Candelario Galvan Candelario Layne  Ordering Physician  Priscila Rosario  Team [facility] - NP Service

## 2022-01-20 NOTE — PROGRESS NOTE ADULT - NUTRITIONAL ASSESSMENT
This patient has been assessed with a concern for Malnutrition and has been determined to have a diagnosis/diagnoses of Severe protein-calorie malnutrition and Underweight (BMI < 19).    This patient is being managed with:   Diet Regular-  No Beef  No Caffeine  No Dairy  No Pork  No Tomatoes  Supplement Feeding Modality:  Oral  Ensure Clear Cans or Servings Per Day:  1       Frequency:  Three Times a day  Entered: Jan 19 2022 10:06AM    Diet Regular-  Consistent Carbohydrate {No Snacks}  No Carb Prosource (1pkg = 15gms Protein)     Qty per Day:  2  Supplement Feeding Modality:  Oral  Ensure Plant-Based Cans or Servings Per Day:  2       Frequency:  Two Times a day  Entered: Jan 16 2022 11:41AM    The following pending diet order is being considered for treatment of Severe protein-calorie malnutrition and Underweight (BMI < 19):null
This patient has been assessed with a concern for Malnutrition and has been determined to have a diagnosis/diagnoses of Severe protein-calorie malnutrition and Underweight (BMI < 19).    This patient is being managed with:   Diet Regular-  No Beef  No Caffeine  No Dairy  No Pork  No Tomatoes  Supplement Feeding Modality:  Oral  Ensure Clear Cans or Servings Per Day:  1       Frequency:  Three Times a day  Entered: Jan 19 2022 10:06AM

## 2022-01-20 NOTE — PROGRESS NOTE ADULT - PROBLEM SELECTOR PLAN 2
- COVID pos  - no pulmonary sxs  -saturating well on RA  -Covid markers negative   -supportive care
- COVID pos  - no pulmonary sxs  - supportive care
- COVID pos  - no pulmonary sxs  -saturating well on RA  -Covid markers negative   -supportive care
- COVID pos  - no pulmonary sxs  -saturating well on RA  -Covid markers negative  -Repeat Covid 1/18 (+)  - c/w supportive care as needed
- COVID pos  - no pulmonary sxs  -saturating well on RA  -Covid markers negative  -Repeat Covid 1/18 (+)  -will repeat covid tomorrow  - c/w supportive care as needed

## 2022-01-20 NOTE — CONSULT NOTE ADULT - SUBJECTIVE AND OBJECTIVE BOX
CHIEF COMPLAINT:Patient is a 56y old  Female who presents with a chief complaint of UTI (20 Jan 2022 15:59)      HPI:  Patient is a 56yoF, w/ PMH of asthma, bradycardia (s/p pacemaker placement), bladder CA (on chemo, s/p nephrostomy tube on right kidney), HTN, and HLD, p/w with left-sided chest pain and back pain this morning. Pain is 8/10, pressure-like, intermittent, and non-radiating. She reports pain worsens with coughing. She complains of productive cough, which also contributes to her SOB and palpitation. She endorses chills, nausea, diarrhea, and one episode of bloody urine. She denies fever, vomiting, or abdominal pain.       PAST MEDICAL & SURGICAL HISTORY:  Anemia    Asthma    HLD (hyperlipidemia)    Pacemaker    Bladder cancer    HTN (hypertension)    Parathyroid tumor    Liver cyst    Hydronephrosis    H/O myomectomy    Presence of cardiac pacemaker    H/O hydronephrosis  s/p Right Perc nephrostomy tube placement 02/2021, exchange 06/2021        MEDICATIONS  (STANDING):  dextrose 5%. 1000 milliLiter(s) (50 mL/Hr) IV Continuous <Continuous>  dextrose 5%. 1000 milliLiter(s) (100 mL/Hr) IV Continuous <Continuous>  dextrose 50% Injectable 12.5 Gram(s) IV Push once  glucagon  Injectable 1 milliGRAM(s) IntraMuscular once  insulin lispro (ADMELOG) corrective regimen sliding scale   SubCutaneous Before meals and at bedtime    MEDICATIONS  (PRN):  acetaminophen     Tablet .. 650 milliGRAM(s) Oral every 6 hours PRN Temp greater or equal to 38C (100.4F), Mild Pain (1 - 3)  cyclobenzaprine 5 milliGRAM(s) Oral three times a day PRN Muscle Spasm      FAMILY HISTORY:  Family history of prostate cancer (Father)    Family history of CHF (congestive heart failure) (Father)    Family history of atrial fibrillation (Father)        SOCIAL HISTORY:    [x ] Non-smoker  [ ] Smoker  [ ] Alcohol    Allergies    No Known Allergies    Intolerances    	    REVIEW OF SYSTEMS:  CONSTITUTIONAL: No fever, weight loss, or fatigue  EYES: No eye pain, visual disturbances, or discharge  ENT:  No difficulty hearing, tinnitus, vertigo; No sinus or throat pain  NECK: No pain or stiffness  RESPIRATORY: No cough, wheezing, chills or hemoptysis; No Shortness of Breath  CARDIOVASCULAR: + chest pain, no palpitations, passing out, dizziness, or leg swelling  GASTROINTESTINAL: No abdominal or epigastric pain. No nausea, vomiting, or hematemesis; No diarrhea or constipation. No melena or hematochezia.  GENITOURINARY: No dysuria, frequency, hematuria, or incontinence  NEUROLOGICAL: No headaches, memory loss, loss of strength, numbness, or tremors  SKIN: No itching, burning, rashes, or lesions   LYMPH Nodes: No enlarged glands  ENDOCRINE: No heat or cold intolerance; No hair loss  MUSCULOSKELETAL: No joint pain or swelling; No muscle, back, or extremity pain  PSYCHIATRIC: No depression, anxiety, mood swings, or difficulty sleeping  HEME/LYMPH: No easy bruising, or bleeding gums  ALLERGY AND IMMUNOLOGIC: No hives or eczema	    [ ] All others negative	  [ ] Unable to obtain    PHYSICAL EXAM:  T(C): 36.9 (01-20-22 @ 14:51), Max: 36.9 (01-20-22 @ 14:51)  HR: 68 (01-20-22 @ 14:51) (58 - 68)  BP: 93/63 (01-20-22 @ 14:51) (93/63 - 107/53)  RR: 18 (01-20-22 @ 14:51) (18 - 18)  SpO2: 98% (01-20-22 @ 14:51) (98% - 99%)  Wt(kg): --  I&O's Summary    19 Jan 2022 07:01  -  20 Jan 2022 07:00  --------------------------------------------------------  IN: 240 mL / OUT: 400 mL / NET: -160 mL        Appearance: Normal	  HEENT:   Normal oral mucosa, PERRL, EOMI	  Lymphatic: No lymphadenopathy  Cardiovascular: Normal S1 S2, No JVD, + murmurs, No edema  Respiratory: Lungs clear to auscultation	  Psychiatry: A & O x 3, Mood & affect appropriate  Gastrointestinal:  Soft, Non-tender, + BS	  Skin: No rashes, No ecchymoses, No cyanosis	  Neurologic: Non-focal  Extremities: Normal range of motion, No clubbing, cyanosis or edema  Vascular: Peripheral pulses palpable 2+ bilaterally    TELEMETRY: 	    ECG:  	  RADIOLOGY:  OTHER: 	  	  LABS:	 	    CARDIAC MARKERS:                              12.1   4.71  )-----------( 296      ( 20 Jan 2022 07:54 )             37.2     01-20    140  |  105  |  21<H>  ----------------------------<  82  4.7   |  30  |  1.08    Ca    10.3      20 Jan 2022 07:54  Phos  3.3     01-20  Mg     2.3     01-20    TPro  7.3  /  Alb  3.1<L>  /  TBili  0.2  /  DBili  x   /  AST  12  /  ALT  17  /  AlkPhos  85  01-20    proBNP:   Lipid Profile:   HgA1c:   TSH:       PREVIOUS DIAGNOSTIC TESTING:       < from: 12 Lead ECG (01.15.22 @ 19:10) >  Diagnosis Line *** Poor data quality, interpretation may be adversely affected  Sinus bradycardia  Otherwise normal ECG    < from: CT Abdomen and Pelvis No Cont (07.19.21 @ 17:18) >  Significantly limited by lack of IV contrast.  Severe end-stage chronic left hydroureteronephrosis without obstructing calculus presumably related to bladder neoplasm.  Poorly defined bladder soft tissue density consistent with afforded history of bladder neoplasm.    COVID-19 PCR . (01.18.22 @ 14:34)    COVID-19 PCR: Detected: EUA/IVD  You can help in the fight against COVID-19. [facility] may contact  you to see if you are interested in voluntarily participating in one of  our clinical trials.  This test has been validated by [employer] to be accurate;  though it has not been FDA cleared/approved by the usual pathway  As with all laboratory test, results should be correlated with clinical  findings.  https://www.fda.gov/media/620185/download  https://www.fda.gov/media/498344/download  < from: Xray Chest 1 View- PORTABLE-Urgent (01.13.22 @ 15:55) >  No acute radiographic findings and no change            \

## 2022-01-20 NOTE — PROGRESS NOTE ADULT - PROBLEM SELECTOR PLAN 6
- h/o HTN, not on any meds  - monitor BP  - no need to start anti-hypertensive medication at this time as BP is soft
- h/o HTN, not on any meds  - monitor BP  - start anti-hypertensive if indicated
- h/o HTN, not on any meds  - monitor BP  - no need to start anti-hypertensive medication at this time as BP is soft

## 2022-01-20 NOTE — CONSULT NOTE ADULT - ASSESSMENT
Patient is a 56yoF, w/ PMH of asthma, bradycardia (s/p pacemaker placement), bladder CA (on chemo, s/p nephrostomy tube on right kidney), HTN, and HLD, p/w with left-sided chest pain and back pain this morning. Pain is 8/10, pressure-like, intermittent, and non-radiating. She reports pain worsens with coughing. She complains of productive cough, which also contributes to her SOB and palpitation. She endorses chills, nausea, diarrhea, and one episode of bloody urine. She denies fever, vomiting, or abdominal pain.   pt with hx of bladder ca on chemis/p r nephrostomy tube placement with r sided chest pain.  COVID + is done with treatment ?ac for covid  hypotension , check orthostatic will consider adding midodrine  sss, s/p ppm, generator almost EOL,. will schedule as out pt ,pt needs to see me next Monday  check d dime if elevated will consider possible cta r/o PE

## 2022-01-20 NOTE — PROGRESS NOTE ADULT - PROBLEM SELECTOR PLAN 8
- h/o b/l lung nodules, being followed by pulmonologist  - supportive care

## 2022-01-20 NOTE — DISCHARGE NOTE PROVIDER - HOSPITAL COURSE
Patient is a 56yoF, w/ PMH of asthma, bradycardia (s/p pacemaker placement), bladder CA (on chemo, s/p nephrostomy tube on right kidney), HTN, and HLD, p/w with left-sided chest pain and back pain this morning. Pain is 8/10, pressure-like, intermittent, and non-radiating. She reports pain worsens with coughing. On admission to the ED she was found with UA positive for infection and also found to be positive for covid infection. Pt was admitted for acute UTI and covid infection.    For UTI, UCX were negative,  she completed a 4 day course of ceftriaxone.     Covid infection- her covid markers were negative, Did not developed respiratory symptoms during hospital stay and did not required supplemental O2.     Pt has hx of bradycardia with pacemakers, she is due for battery change of pacemaker. She could not be transferred to [facility] for battery exchange procedure, given that   she remained covid (+) on PCR testing. As per dr. Rosario she needs to follow up OP on 01/31/22 .    Pt has hx of bladder cancer, previously on CMT. She has  right nephrostomy. Pt due for nephrostomy tube exchange, the plan was for pt to undergo catheter exchange by IR  on 1/21/222, pt did not want to undergo procedure at this time.     Given clinical improvement, pt is to be discharged home today. She needs to follow up OP with Dr. Rosario cardiology on 1/31/22 at 11 AM to arrange for battery replacement of the pacemaker. She also needs to follow up OP with interventional radiology for nephrostomy tube exchange.

## 2022-01-20 NOTE — PROGRESS NOTE ADULT - PROBLEM SELECTOR PLAN 1
- p/w bloody urine (h/o bloody urine w/ UTI in the past)  - U/A pos  - last culture acinetobacter, sensitive to ceftriaxone (Sept2021)  - c/w ceftriaxone D4  - Ucx negative
- p/w bloody urine (h/o bloody urine w/ UTI in the past)  - U/A pos  - last culture acinetobacter, sensitive to ceftriaxone (Sept2021)  - c/w ceftriaxone D3  - Ucx negative
- p/w bloody urine (h/o bloody urine w/ UTI in the past)  - U/A pos  - last culture acinetobacter, sensitive to ceftriaxone (Sept2021)  - c/w ceftriaxone  - f/u Ucx, Bcx
- p/w bloody urine (h/o bloody urine w/ UTI in the past)  - U/A pos  - last culture acinetobacter, sensitive to ceftriaxone (Sept2021)  - Ucx negative  - D/fernie ceftriaxone today
- p/w bloody urine (h/o bloody urine w/ UTI in the past)  - U/A pos  - last culture acinetobacter, sensitive to ceftriaxone (Sept2021)  - Ucx negative  - completed 4 day course of ceftriaxone   - resolved

## 2022-01-20 NOTE — PROGRESS NOTE ADULT - PROBLEM SELECTOR PLAN 7
- h/o parathyroid tumor, plan for surgical management in the future  - supportive care

## 2022-01-20 NOTE — PROGRESS NOTE ADULT - PROBLEM SELECTOR PLAN 4
- h/o bladder CA (on chemo, s/p right nephrostomy)  - no chemo due to COVID infection  - supportive care
- h/o bladder CA (on chemo, s/p right nephrostomy)  - no chemo due to COVID infection  - supportive care
- h/o bladder CA (on chemo,  right nephrostomy)  - no chemo due to COVID infection  - supportive care  - Planning for catheter exchange tomorrow by IR  - NPO after Midnight
- h/o bladder CA (on chemo, s/p right nephrostomy)  - no chemo due to COVID infection  - supportive care
- h/o bladder CA (on chemo, s/p right nephrostomy)  - no chemo due to COVID infection  - supportive care

## 2022-01-20 NOTE — DISCHARGE NOTE PROVIDER - CARE PROVIDER_API CALL
melvin bañuelos  [address] [place], [place], [address]  Phone: (   )    -  Fax: (   )    -  Scheduled Appointment: 01/31/2022 11:00 AM   melvin bañuelos  [address] [place], [place], [address]  Phone: (   )    -  Fax: (   )    -  Scheduled Appointment: 01/31/2022 11:00 AM    interventional, radiology  Phone: (   )    -  Fax: (   )    -  Established Patient  Follow Up Time: 1 week

## 2022-01-20 NOTE — DISCHARGE NOTE PROVIDER - NSDCCPCAREPLAN_GEN_ALL_CORE_FT
PRINCIPAL DISCHARGE DIAGNOSIS  Diagnosis: 2019 novel coronavirus disease (COVID-19)  Assessment and Plan of Treatment: You were found to have covid infection on admission to the emergency room. However, you did not develop respiratory symptoms and did not require supplemental Oxygen.   CORONAVIRUS INSTRUCTIONS:   Based on your current clinical status and stability, it has been determined that you no longer need hospitalization and can recover while remaining in self-quarantine at home until JANUARY 25th. You should follow the prevention steps below until a healthcare provider or local or state health department says you can return to your normal activities.   1. You should restrict activities outside your home, except for getting medical care.   2. Do not go to work, school, or public areas.   3. Avoid using public transportation, ride-sharing, or taxis.   4. Separate yourself from other people and animals in your home as much as possible.  When you are around other people (e.g., sharing a room or vehicle) you should wear a facemask.  5. Wash your hands often with soap and water for at least 20 seconds, especially after blowing your nose, coughing, or sneezing; going to the bathroom; and before eating or preparing food.  6. Cover your mouth and nose with a tissue when you cough or sneeze. Throw used tissues in a lined trash can. Immediately wash your hands with soap and water for at least 20 seconds  7. High touch surfaces include counters, tabletops, doorknobs, bathroom fixtures, toilets, phones, keyboards, tablets, and bedside tables.  8. Avoid sharing dishes, drinking glasses, cups, eating utensils, towels, or bedding with other people or pets in your home. After using these items, they should be washed thoroughly with soap and water.  You are strongly advised to seek prompt medical attention if your illness worsens or you develop new symptoms like fever or difficulty breathing.        SECONDARY DISCHARGE DIAGNOSES  Diagnosis: Acute UTI  Assessment and Plan of Treatment: You were found to have a urinary tract infection. Urine cultures showed no growth of bacteria. You have completed 4 day course of Antibiotic. If you experience continued symptoms of infection (fever, chills, weakness, or burning with urination), please follow up with your primary care provider.      Diagnosis: Bladder cancer  Assessment and Plan of Treatment: You have history of bladder cancer. As as result you have a nephrostomy tube. Such tube is due for exchange. We were planning on exchanging the catheter while you were in the hospital, but you deferred the procedure. Please follow up with interventional radiology to reschedule the procedure as an outpatient.    Diagnosis: Bradycardia  Assessment and Plan of Treatment: You have history of low heart rate. To manage such condition you have a pacemaker. It is due for battery change. Please follow up outpatient with dr. Kody Rosario to arrange for such procedure.    Diagnosis: Severe protein-calorie malnutrition  Assessment and Plan of Treatment: You were evaluated by nutrition service for concerns of severe calorie malnutrition. It is recommended that you take 1 can of insure daily to meet your daily calorie and nutritional requirments.

## 2022-01-20 NOTE — DISCHARGE NOTE PROVIDER - DETAILS OF MALNUTRITION DIAGNOSIS/DIAGNOSES
This patient has been assessed with a concern for Malnutrition and was treated during this hospitalization for the following Nutrition diagnosis/diagnoses:     -  01/19/2022: Severe protein-calorie malnutrition   -  01/19/2022: Underweight (BMI < 19)

## 2022-01-20 NOTE — PROGRESS NOTE ADULT - PROBLEM SELECTOR PLAN 5
- h/o HLD, not on any meds  - lipid panel noted., LDL mildly elevated
- h/o HLD, not on any meds  - f/u lipid panel
- h/o HLD, not on any meds  - lipid panel noted., LDL mildly elevated

## 2022-01-20 NOTE — PROGRESS NOTE ADULT - SUBJECTIVE AND OBJECTIVE BOX
PGY-1 Progress Note discussed with attending    PAGER #: [[phone]] TILL 5:00 PM  PLEASE CONTACT ON CALL TEAM:  - On Call Team (Please refer to Ainsley) FROM 5:00 PM - 8:30PM  - Nightfloat Team FROM 8:30 -7:30 AM    CHIEF COMPLAINT & BRIEF HOSPITAL COURSE:    INTERVAL HPI/OVERNIGHT EVENTS:       REVIEW OF SYSTEMS:  CONSTITUTIONAL: No fever, weight loss, or fatigue  RESPIRATORY: No cough, wheezing, chills or hemoptysis; No shortness of breath  CARDIOVASCULAR: No chest pain, palpitations, dizziness, or leg swelling  GASTROINTESTINAL: No abdominal pain. No nausea, vomiting, or hematemesis; No diarrhea or constipation. No melena or hematochezia.  GENITOURINARY: No dysuria or hematuria, urinary frequency  NEUROLOGICAL: No headaches, memory loss, loss of strength, numbness, or tremors  SKIN: No itching, burning, rashes, or lesions     Vital Signs Last 24 Hrs  T(C): 36.7 (20 Jan 2022 04:43), Max: 36.8 (19 Jan 2022 14:33)  T(F): 98.1 (20 Jan 2022 04:43), Max: 98.2 (19 Jan 2022 14:33)  HR: 58 (20 Jan 2022 04:43) (58 - 70)  BP: 96/64 (20 Jan 2022 04:43) (96/64 - 107/53)  BP(mean): --  RR: 18 (20 Jan 2022 04:43) (17 - 18)  SpO2: 99% (20 Jan 2022 04:43) (98% - 99%)    PHYSICAL EXAMINATION:  GENERAL: NAD, well built  HEAD:  Atraumatic, Normocephalic  EYES:  conjunctiva and sclera clear  NECK: Supple, No JVD, Normal thyroid  CHEST/LUNG: Clear to auscultation. Clear to percussion bilaterally; No rales, rhonchi, wheezing, or rubs  HEART: Regular rate and rhythm; No murmurs, rubs, or gallops  ABDOMEN: Soft, Nontender, Nondistended; Bowel sounds present  NERVOUS SYSTEM:  Alert & Oriented X3,    EXTREMITIES:  2+ Peripheral Pulses, No clubbing, cyanosis, or edema  SKIN: warm dry                          12.1   4.71  )-----------( 296      ( 20 Jan 2022 07:54 )             37.2     01-20    140  |  105  |  21<H>  ----------------------------<  82  4.7   |  30  |  1.08    Ca    10.3      20 Jan 2022 07:54  Phos  3.3     01-20  Mg     2.3     01-20    TPro  7.3  /  Alb  3.1<L>  /  TBili  0.2  /  DBili  x   /  AST  12  /  ALT  17  /  AlkPhos  85  01-20    LIVER FUNCTIONS - ( 20 Jan 2022 07:54 )  Alb: 3.1 g/dL / Pro: 7.3 g/dL / ALK PHOS: 85 U/L / ALT: 17 U/L DA / AST: 12 U/L / GGT: x                   CAPILLARY BLOOD GLUCOSE      RADIOLOGY & ADDITIONAL TESTS:                   PGY-1 Progress Note discussed with attending    PAGER #: [[phone]] TILL 5:00 PM  PLEASE CONTACT ON CALL TEAM:  - On Call Team (Please refer to Ainsley) FROM 5:00 PM - 8:30PM  - Nightfloat Team FROM 8:30 -7:30 AM        INTERVAL HPI/OVERNIGHT EVENTS:  Pt seen and examined at bedside, still experiencing dizziness however, improved. No other acute complaint.      REVIEW OF SYSTEMS:  CONSTITUTIONAL: No fever, weight loss, or fatigue  RESPIRATORY: No cough, wheezing, chills or hemoptysis; No shortness of breath  CARDIOVASCULAR: No chest pain, palpitations, + dizziness (improved), or leg swelling  GASTROINTESTINAL: No abdominal pain. No nausea, vomiting, or hematemesis; No diarrhea or constipation. No melena or hematochezia.  GENITOURINARY: No dysuria or hematuria, urinary frequency  NEUROLOGICAL: No headaches, memory loss, loss of strength, numbness, or tremors  SKIN: No itching, burning, rashes, or lesions     Vital Signs Last 24 Hrs  T(C): 36.7 (20 Jan 2022 04:43), Max: 36.8 (19 Jan 2022 14:33)  T(F): 98.1 (20 Jan 2022 04:43), Max: 98.2 (19 Jan 2022 14:33)  HR: 58 (20 Jan 2022 04:43) (58 - 70)  BP: 96/64 (20 Jan 2022 04:43) (96/64 - 107/53)  BP(mean): --  RR: 18 (20 Jan 2022 04:43) (17 - 18)  SpO2: 99% (20 Jan 2022 04:43) (98% - 99%)    PHYSICAL EXAMINATION:  GENERAL: NAD, thin  HEAD:  Atraumatic, Normocephalic  EYES:  conjunctiva and sclera clear  NECK: Supple, No JVD  CHEST/LUNG: Clear to auscultation. Clear to percussion bilaterally; No rales, rhonchi, wheezing, or rubs  HEART: Regular rate and rhythm; No murmurs, rubs, or gallops  ABDOMEN: Soft, Nontender, Nondistended; Bowel sounds present. Nephrostomy tube on the R flank, functioning, draining clear urine  NERVOUS SYSTEM:  Alert & Oriented X3,    EXTREMITIES:  2+ Peripheral Pulses, No clubbing, cyanosis, or edema  SKIN: warm dry                                   12.1   4.71  )-----------( 296      ( 20 Jan 2022 07:54 )             37.2     01-20    140  |  105  |  21<H>  ----------------------------<  82  4.7   |  30  |  1.08    Ca    10.3      20 Jan 2022 07:54  Phos  3.3     01-20  Mg     2.3     01-20    TPro  7.3  /  Alb  3.1<L>  /  TBili  0.2  /  DBili  x   /  AST  12  /  ALT  17  /  AlkPhos  85  01-20    LIVER FUNCTIONS - ( 20 Jan 2022 07:54 )  Alb: 3.1 g/dL / Pro: 7.3 g/dL / ALK PHOS: 85 U/L / ALT: 17 U/L DA / AST: 12 U/L / GGT: x                   CAPILLARY BLOOD GLUCOSE      RADIOLOGY & ADDITIONAL TESTS:

## 2022-01-20 NOTE — PROGRESS NOTE ADULT - ASSESSMENT
_________________________________________________________________________________________  ========>>  M E D I C A L   A T T E N D I N G    F O L L O W  U P  N O T E  <<=========  -----------------------------------------------------------------------------------------------------    - Patient seen and examined by me earlier today.   - In summary,  JEF HOUSE is a 56y year old woman admitted with UTI, COVID  - Patient today overall doing ok, comfortable, eating OK. pt concerned about low BP     ==================>> REVIEW OF SYSTEM <<=================    GEN: no fever, no chills, no pain ( but + discomfort from nephrostomy: which is due to exchange)   RESP: no SOB, no cough, no sputum  CVS: no chest pain, no palpitations, no edema  GI: no abdominal pain, no nausea  : no dysuria, no frequency, no hematuria  Neuro: no headache, no dizziness  Derm : no itching, no rash    ==================>> PHYSICAL EXAM <<=================    GEN: A&O X 3 , NAD , comfortable, pleasant, calm   HEENT: NCAT, PERRL, MMM, hearing intact  Neck: supple , no JVD appreciated  CVS: S1S2 , regular , No M/R/G appreciated  PULM: CTA B/L,  no W/R/R appreciated  ABD.: soft. non tender, non distended,  bowel sounds present  Extrem: intact pulses , no edema   PSYCH : normal mood,  not anxious                            ( Note Written / Date of service :  01-20-22 )    ==================>> MEDICATIONS <<====================    MEDICATIONS  (STANDING):  dextrose 5%. 1000 milliLiter(s) (50 mL/Hr) IV Continuous <Continuous>  dextrose 5%. 1000 milliLiter(s) (100 mL/Hr) IV Continuous <Continuous>  dextrose 50% Injectable 12.5 Gram(s) IV Push once  glucagon  Injectable 1 milliGRAM(s) IntraMuscular once  insulin lispro (ADMELOG) corrective regimen sliding scale   SubCutaneous Before meals and at bedtime    MEDICATIONS  (PRN):  acetaminophen     Tablet .. 650 milliGRAM(s) Oral every 6 hours PRN Temp greater or equal to 38C (100.4F), Mild Pain (1 - 3)  cyclobenzaprine 5 milliGRAM(s) Oral three times a day PRN Muscle Spasm    ___________  Active diet:  Diet, NPO after Midnight:      NPO Start Date: 20-Jan-2022,   NPO Start Time: 23:59  Diet, Regular:   No Beef  No Caffeine  No Dairy  No Pork  No Tomatoes  Supplement Feeding Modality:  Oral  Ensure Clear Cans or Servings Per Day:  1       Frequency:  Three Times a day  ___________________    ==================>> VITAL SIGNS <<==================    T(C): 36.6 (01-20-22 @ 21:41), Max: 36.9 (01-20-22 @ 14:51)  HR: 58 (01-20-22 @ 21:41) (58 - 68)  BP: 126/62 (01-20-22 @ 21:41) (93/63 - 126/62)  RR: 18 (01-20-22 @ 21:41) (18 - 18)  SpO2: 100% (01-20-22 @ 21:41) (98% - 100%)     POCT Blood Glucose.: 97 mg/dL (20 Jan 2022 21:20)  POCT Blood Glucose.: 96 mg/dL (20 Jan 2022 17:17)  POCT Blood Glucose.: 71 mg/dL (20 Jan 2022 12:14)  POCT Blood Glucose.: 72 mg/dL (20 Jan 2022 07:46)    I&O's Summary    19 Jan 2022 07:01  -  20 Jan 2022 07:00  --------------------------------------------------------  IN: 240 mL / OUT: 400 mL / NET: -160 mL     ==================>> LAB AND IMAGING <<==================                        12.1   4.71  )-----------( 296      ( 20 Jan 2022 07:54 )             37.2        01-20    140  |  105  |  21<H>  ----------------------------<  82  4.7   |  30  |  1.08    Ca    10.3      20 Jan 2022 07:54  Phos  3.3     01-20  Mg     2.3     01-20    TPro  7.3  /  Alb  3.1<L>  /  TBili  0.2  /  DBili  x   /  AST  12  /  ALT  17  /  AlkPhos  85  01-20    TSH:      0.39   (01-16-22)           Lipid profile:  (01-16-22)     Total: 226     LDL  : (p)     HDL  :53     TG   :126     HgA1C:   (01-16-22)          (01-16-22)      5.5    ___________________________________________________________________________________  ===============>>  A S S E S S M E N T   A N D   P L A N <<===============  ------------------------------------------------------------------------------------------    · Assessment	  Patient is a 56yoF, w/ PMH of asthma, bladder CA (on chemo, s/p nephrostomy tube on right kidney), HTN, and HLD, p/w with left-sided chest pain and back pain. Admitted for UTI        Problem/Plan - 1:  ·  Problem: Acute UTI.   ·  Plan: - p/w bloody urine (h/o bloody urine w/ UTI in the past)  - U/A pos  - last culture acinetobacter, sensitive to ceftriaxone (Sept2021)  - Ucx negative  - D/fernie ceftriaxone : monitor  - nephrostomy tube change tomorrow by IR     Problem/Plan - 2:  ·  Problem: 2019 novel coronavirus disease (COVID-19).   ·  Plan: - COVID pos  - no pulmonary sxs  -saturating well on RA  -Covid markers negative  -Repeat Covid 1/18 (+)  - c/w supportive care as needed.  - D Dimer was negative, recheck in AM    Problem/Plan - 3:  ·  Problem: Bradycardia.   ·  Plan: - h/o bradycardia, s/p pacemaker placement  - trop, BNP negative  -appreciated cardiology note : PPM generator change can be done as OP    Problem/Plan - 4:  ·  Problem: Bladder cancer.   ·  Plan: - h/o bladder CA (on chemo, s/p right nephrostomy)  - no chemo due to COVID infection  - supportive care.    Problem/Plan - 5:  ·  Problem: HLD (hyperlipidemia).   ·  Plan: - h/o HLD, not on any meds  - lipid panel noted., LDL mildly elevated.    Problem/Plan - 6:  ·  Problem: HTN (hypertension).   ·  Plan: - h/o HTN, not on any meds  - monitor BP  - no need to start anti-hypertensive medication at this time as BP is soft.    Problem/Plan - 7:  ·  Problem: Parathyroid tumor.   ·  Plan: - h/o parathyroid tumor, plan for surgical management in the future  - supportive care.    Problem/Plan - 8:  ·  Problem: Lung nodule.   ·  Plan: - h/o b/l lung nodules, being followed by pulmonologist  - supportive care.    Problem/Plan - 9:  ·  Problem: Prophylactic measure.   ·  Plan: - DVT: lovenox  - GI: none.    DC planing home tomorrow post nephrostomy change if remains stable     --------------------------------------------  Case discussed with pt, HS..   Education given on findings and plan of care  ___________________________  H. NIEVES Ochoa.  Pager: [phone]

## 2022-01-20 NOTE — PROGRESS NOTE ADULT - PROBLEM SELECTOR PLAN 3
- h/o bradycardia, s/p pacemaker placement  - trop, BNP negative  -Once pt is off isolation, the plan is for pt to be transferred to [facility] for battery change of pacemaker. Dr. Rosario aware. However, will discuss with Dr. Rosario  today whether pt can be DC home and then go as an OP to [facility] for the battery change of the pacemaker.
- h/o bradycardia, s/p pacemaker placement  - trop, BNP negative  -Once pt is off isolation and covid  (-), the plan is for pt to be transferred to [facility] for battery change of pacemaker. Dr. Rosario aware
- h/o bradycardia, s/p pacemaker placement  - trop, BNP negative  -Once pt is off isolation and covid  (-), the plan is for pt to be transferred to [facility] for battery change of pacemaker. Dr. Rosario aware
- h/o bradycardia, s/p pacemaker placement  - trop, BNP negative  - plan for battery change in the near future
- h/o bradycardia, s/p pacemaker placement  - trop, BNP negative  -Once pt is off isolation, the plan is for pt to be transferred to [facility] for battery change of pacemaker. Dr. Rosario aware

## 2022-01-21 ENCOUNTER — TRANSCRIPTION ENCOUNTER (OUTPATIENT)
Age: 57
End: 2022-01-21

## 2022-01-21 VITALS
TEMPERATURE: 98 F | DIASTOLIC BLOOD PRESSURE: 66 MMHG | RESPIRATION RATE: 16 BRPM | OXYGEN SATURATION: 100 % | HEART RATE: 62 BPM | SYSTOLIC BLOOD PRESSURE: 101 MMHG

## 2022-01-21 LAB
ALBUMIN SERPL ELPH-MCNC: 3.1 G/DL — LOW (ref 3.5–5)
ALP SERPL-CCNC: 82 U/L — SIGNIFICANT CHANGE UP (ref 40–120)
ALT FLD-CCNC: 19 U/L DA — SIGNIFICANT CHANGE UP (ref 10–60)
ANION GAP SERPL CALC-SCNC: 4 MMOL/L — LOW (ref 5–17)
APTT BLD: 30 SEC — SIGNIFICANT CHANGE UP (ref 27.5–35.5)
AST SERPL-CCNC: 13 U/L — SIGNIFICANT CHANGE UP (ref 10–40)
BILIRUB SERPL-MCNC: 0.2 MG/DL — SIGNIFICANT CHANGE UP (ref 0.2–1.2)
BUN SERPL-MCNC: 25 MG/DL — HIGH (ref 7–18)
CALCIUM SERPL-MCNC: 10 MG/DL — SIGNIFICANT CHANGE UP (ref 8.4–10.5)
CHLORIDE SERPL-SCNC: 105 MMOL/L — SIGNIFICANT CHANGE UP (ref 96–108)
CO2 SERPL-SCNC: 30 MMOL/L — SIGNIFICANT CHANGE UP (ref 22–31)
CREAT SERPL-MCNC: 1.15 MG/DL — SIGNIFICANT CHANGE UP (ref 0.5–1.3)
GLUCOSE BLDC GLUCOMTR-MCNC: 188 MG/DL — HIGH (ref 70–99)
GLUCOSE BLDC GLUCOMTR-MCNC: 77 MG/DL — SIGNIFICANT CHANGE UP (ref 70–99)
GLUCOSE BLDC GLUCOMTR-MCNC: 86 MG/DL — SIGNIFICANT CHANGE UP (ref 70–99)
GLUCOSE SERPL-MCNC: 91 MG/DL — SIGNIFICANT CHANGE UP (ref 70–99)
HCT VFR BLD CALC: 34.3 % — LOW (ref 34.5–45)
HGB BLD-MCNC: 11.4 G/DL — LOW (ref 11.5–15.5)
INR BLD: 1.09 RATIO — SIGNIFICANT CHANGE UP (ref 0.88–1.16)
MAGNESIUM SERPL-MCNC: 2.8 MG/DL — HIGH (ref 1.6–2.6)
MCHC RBC-ENTMCNC: 29.8 PG — SIGNIFICANT CHANGE UP (ref 27–34)
MCHC RBC-ENTMCNC: 33.2 GM/DL — SIGNIFICANT CHANGE UP (ref 32–36)
MCV RBC AUTO: 89.6 FL — SIGNIFICANT CHANGE UP (ref 80–100)
NRBC # BLD: 0 /100 WBCS — SIGNIFICANT CHANGE UP (ref 0–0)
PHOSPHATE SERPL-MCNC: 3.2 MG/DL — SIGNIFICANT CHANGE UP (ref 2.5–4.5)
PLATELET # BLD AUTO: 246 K/UL — SIGNIFICANT CHANGE UP (ref 150–400)
POTASSIUM SERPL-MCNC: 4.7 MMOL/L — SIGNIFICANT CHANGE UP (ref 3.5–5.3)
POTASSIUM SERPL-SCNC: 4.7 MMOL/L — SIGNIFICANT CHANGE UP (ref 3.5–5.3)
PROT SERPL-MCNC: 6.9 G/DL — SIGNIFICANT CHANGE UP (ref 6–8.3)
PROTHROM AB SERPL-ACNC: 12.9 SEC — SIGNIFICANT CHANGE UP (ref 10.6–13.6)
RBC # BLD: 3.83 M/UL — SIGNIFICANT CHANGE UP (ref 3.8–5.2)
RBC # FLD: 15.3 % — HIGH (ref 10.3–14.5)
SARS-COV-2 RNA SPEC QL NAA+PROBE: DETECTED
SODIUM SERPL-SCNC: 139 MMOL/L — SIGNIFICANT CHANGE UP (ref 135–145)
WBC # BLD: 4.34 K/UL — SIGNIFICANT CHANGE UP (ref 3.8–10.5)
WBC # FLD AUTO: 4.34 K/UL — SIGNIFICANT CHANGE UP (ref 3.8–10.5)

## 2022-01-21 PROCEDURE — 82962 GLUCOSE BLOOD TEST: CPT

## 2022-01-21 PROCEDURE — 36415 COLL VENOUS BLD VENIPUNCTURE: CPT

## 2022-01-21 PROCEDURE — 84100 ASSAY OF PHOSPHORUS: CPT

## 2022-01-21 PROCEDURE — 82803 BLOOD GASES ANY COMBINATION: CPT

## 2022-01-21 PROCEDURE — 80053 COMPREHEN METABOLIC PANEL: CPT

## 2022-01-21 PROCEDURE — 93005 ELECTROCARDIOGRAM TRACING: CPT

## 2022-01-21 PROCEDURE — 82728 ASSAY OF FERRITIN: CPT

## 2022-01-21 PROCEDURE — 85730 THROMBOPLASTIN TIME PARTIAL: CPT

## 2022-01-21 PROCEDURE — 87635 SARS-COV-2 COVID-19 AMP PRB: CPT

## 2022-01-21 PROCEDURE — 71045 X-RAY EXAM CHEST 1 VIEW: CPT

## 2022-01-21 PROCEDURE — 85379 FIBRIN DEGRADATION QUANT: CPT

## 2022-01-21 PROCEDURE — 87086 URINE CULTURE/COLONY COUNT: CPT

## 2022-01-21 PROCEDURE — 83036 HEMOGLOBIN GLYCOSYLATED A1C: CPT

## 2022-01-21 PROCEDURE — 85384 FIBRINOGEN ACTIVITY: CPT

## 2022-01-21 PROCEDURE — 84443 ASSAY THYROID STIM HORMONE: CPT

## 2022-01-21 PROCEDURE — 81001 URINALYSIS AUTO W/SCOPE: CPT

## 2022-01-21 PROCEDURE — 83735 ASSAY OF MAGNESIUM: CPT

## 2022-01-21 PROCEDURE — 85025 COMPLETE CBC W/AUTO DIFF WBC: CPT

## 2022-01-21 PROCEDURE — 83880 ASSAY OF NATRIURETIC PEPTIDE: CPT

## 2022-01-21 PROCEDURE — 96374 THER/PROPH/DIAG INJ IV PUSH: CPT

## 2022-01-21 PROCEDURE — 84484 ASSAY OF TROPONIN QUANT: CPT

## 2022-01-21 PROCEDURE — 83690 ASSAY OF LIPASE: CPT

## 2022-01-21 PROCEDURE — 85610 PROTHROMBIN TIME: CPT

## 2022-01-21 PROCEDURE — 80061 LIPID PANEL: CPT

## 2022-01-21 PROCEDURE — 83615 LACTATE (LD) (LDH) ENZYME: CPT

## 2022-01-21 PROCEDURE — 86140 C-REACTIVE PROTEIN: CPT

## 2022-01-21 PROCEDURE — 84145 PROCALCITONIN (PCT): CPT

## 2022-01-21 PROCEDURE — 99285 EMERGENCY DEPT VISIT HI MDM: CPT | Mod: 25

## 2022-01-21 PROCEDURE — 85027 COMPLETE CBC AUTOMATED: CPT

## 2022-01-21 NOTE — DISCHARGE NOTE NURSING/CASE MANAGEMENT/SOCIAL WORK - NSDCPEFALRISK_GEN_ALL_CORE
For information on Fall & Injury Prevention, visit: [email or web address] OR  [email or web address] OR  https://www.cdc.gov/steadi/patient.html

## 2022-01-21 NOTE — PROGRESS NOTE ADULT - SUBJECTIVE AND OBJECTIVE BOX
55 yo female with right nephrostomy tube in place, draining well.  Now hospitalized for other conditions. Requested     tube exchange during this hospital stay for patient generally has this done every 3  months and is now due.  Planned for today, but called by house staff that stated patient does not want procedure at this time and will call after discharge to follow up.

## 2022-01-21 NOTE — PROGRESS NOTE ADULT - PROVIDER SPECIALTY LIST ADULT
Internal Medicine
Intervent Radiology
Internal Medicine
Intervent Radiology

## 2022-01-21 NOTE — PROGRESS NOTE ADULT - ASSESSMENT
_________________________________________________________________________________________  ========>>  M E D I C A L   A T T E N D I N G    F O L L O W  U P  N O T E  <<=========  -----------------------------------------------------------------------------------------------------    - Patient seen and examined by me earlier today.   - In summary,  JEF HOUSE is a 56y year old woman admitted with UTI, COVID  - Patient today overall doing ok, comfortable, eating OK.     ==================>> REVIEW OF SYSTEM <<=================    GEN: no fever, no chills, no pain ( + discomfort from nephrostomy at times / on and off)   RESP: no SOB, no cough, no sputum  CVS: no chest pain, no palpitations, no edema  GI: no abdominal pain, no nausea  : no dysuria, no frequency, no hematuria  Neuro: no headache, no dizziness  Derm : no itching, no rash    ==================>> PHYSICAL EXAM <<=================    GEN: A&O X 3 , NAD , comfortable, pleasant, calm , cachetic   HEENT: NCAT, PERRL, MMM, hearing intact  Neck: supple , no JVD appreciated  CVS: S1S2 , regular , No M/R/G appreciated  PULM: CTA B/L,  no W/R/R appreciated  ABD.: soft. non tender, non distended  Extrem: intact pulses , no edema   PSYCH : normal mood,  not anxious                             ( Note written / Date of service 01-21-22 )    ==================>> MEDICATIONS <<====================    dextrose 5%. 1000 milliLiter(s) IV Continuous <Continuous>  dextrose 5%. 1000 milliLiter(s) IV Continuous <Continuous>  dextrose 50% Injectable 12.5 Gram(s) IV Push once  glucagon  Injectable 1 milliGRAM(s) IntraMuscular once  insulin lispro (ADMELOG) corrective regimen sliding scale   SubCutaneous Before meals and at bedtime    MEDICATIONS  (PRN):  acetaminophen     Tablet .. 650 milliGRAM(s) Oral every 6 hours PRN Temp greater or equal to 38C (100.4F), Mild Pain (1 - 3)  cyclobenzaprine 5 milliGRAM(s) Oral three times a day PRN Muscle Spasm    ___________  Active diet:  Diet, Regular:   No Beef  No Caffeine  No Dairy  No Pork  No Tomatoes  Supplement Feeding Modality:  Oral  Ensure Clear Cans or Servings Per Day:  1       Frequency:  Three Times a day  ___________________    ==================>> VITAL SIGNS <<==================    Vital Signs Last 24 HrsT(C): 36.7 (01-21-22 @ 08:23)  T(F): 98 (01-21-22 @ 08:23), Max: 98.5 (01-20-22 @ 14:51)  HR: 84 (01-21-22 @ 08:23) (52 - 84)  BP: 103/63 (01-21-22 @ 08:23)  RR: 16 (01-21-22 @ 08:58) (16 - 18)  SpO2: 100% (01-21-22 @ 08:58) (98% - 100%)      POCT Blood Glucose.: 188 mg/dL (21 Jan 2022 11:32)  POCT Blood Glucose.: 77 mg/dL (21 Jan 2022 08:06)  POCT Blood Glucose.: 97 mg/dL (20 Jan 2022 21:20)  POCT Blood Glucose.: 96 mg/dL (20 Jan 2022 17:17)     ==================>> LAB AND IMAGING <<==================                        11.4   4.34  )-----------( 246      ( 21 Jan 2022 06:26 )             34.3        01-21    139  |  105  |  25<H>  ----------------------------<  91  4.7   |  30  |  1.15    Ca    10.0      21 Jan 2022 06:26  Phos  3.2     01-21  Mg     2.8     01-21    TPro  6.9  /  Alb  3.1<L>  /  TBili  0.2  /  DBili  x   /  AST  13  /  ALT  19  /  AlkPhos  82  01-21    WBC count:   4.34 <<== ,  4.71 <<== ,  4.28 <<== ,  4.45 <<== ,  4.46 <<==   Hemoglobin:   11.4 <<==,  12.1 <<==,  11.7 <<==,  12.3 <<==,  12.5 <<==  platelets:  246 <==, 296 <==, 314 <==, 333 <==, 365 <==, 357 <==    Creatinine:  1.15  <<==, 1.08  <<==, 1.05  <<==, 1.09  <<==, 1.15  <<==, 1.15  <<==  Sodium:   139  <==, 140  <==, 140  <==, 141  <==, 140  <==, 142  <==       AST:          13 <== , 12 <== , 13 <== , 14 <== , 15 <==      ALT:        19  <== , 17  <== , 16  <== , 16  <== , 18  <==      AP:        82  <=, 85  <=, 81  <=, 83  <=, 86  <=     Bili:        0.2  <=, 0.2  <=, 0.2  <=, 0.3  <=, 0.2  <=    _______________________  C U L T U R E S :    Culture - Urine (collected 16 Jan 2022 03:50)  Source: Clean Catch Clean Catch (Midstream)  Final Report (17 Jan 2022 07:33):    <10,000 CFU/mL Normal Urogenital Vandana    COVID-19 PCR: Detected (01-21-22 @ 07:20)  COVID-19 PCR: Detected (01-18-22 @ 14:34)  COVID-19 PCR: Detected (01-15-22 @ 19:07)    ___________________________________________________________________________________  ===============>>  A S S E S S M E N T   A N D   P L A N <<===============  ------------------------------------------------------------------------------------------    · Assessment	  Patient is a 56yoF, w/ PMH of asthma, bladder CA (on chemo, s/p nephrostomy tube on right kidney), HTN, and HLD, p/w with left-sided chest pain and back pain. Admitted for UTI      Problem/Plan - 1:  ·  Problem: Acute UTI.   ·  Plan: - p/w bloody urine (h/o bloody urine w/ UTI in the past)  - U/A pos  - last culture acinetobacter, sensitive to ceftriaxone (Sept2021)  - Ucx negative  - D/fernie ceftriaxone : monitor  - nephrostomy tube change postponed per pt > to do later as OP     Problem/Plan - 2:  ·  Problem: 2019 novel coronavirus disease (COVID-19).   ·  Plan: - COVID pos  - no pulmonary sxs  -saturating well on RA  -Covid markers negative  -Repeat Covid 1/18 (+)  - c/w supportive care as needed.  D-Dimer Assay, Quantitative: <150    Problem/Plan - 3:  ·  Problem: Bradycardia.   ·  Plan: - h/o bradycardia, s/p pacemaker placement  - trop, BNP negative  -appreciated cardiology note : PPM generator change can be done as OP    Problem/Plan - 4:  ·  Problem: Bladder cancer.   ·  Plan: - h/o bladder CA (on chemo, s/p right nephrostomy)  - no chemo due to COVID infection  - supportive care.    Problem/Plan - 5:  ·  Problem: HLD (hyperlipidemia).   ·  Plan: - h/o HLD, not on any meds  - lipid panel noted., LDL mildly elevated.    Problem/Plan - 6:  ·  Problem: HTN (hypertension).   ·  Plan: - h/o HTN, not on any meds  - monitor BP  - no need to start anti-hypertensive medication at this time as BP is soft.    Problem/Plan - 7:  ·  Problem: Parathyroid tumor.   ·  Plan: - h/o parathyroid tumor, plan for surgical management in the future  - supportive care.    Problem/Plan - 8:  ·  Problem: Lung nodule.   ·  Plan: - h/o b/l lung nodules, being followed by pulmonologist  - supportive care.    Problem/Plan - 9:  ·  Problem: Prophylactic measure.   ·  Plan: - DVT: lovenox  - GI: none.    DC planing home today    --------------------------------------------  Case discussed with pt, HS..   Education given on findings and plan of care  ___________________________  H. NIEVES Ochoa.  Pager: [phone]

## 2022-02-08 NOTE — H&P CARDIOLOGY - ADDITIONAL PE
Right nephrostomy tube - Dressing C/D/I - Mild tenderness to right flank area on palpation and per patient this is pre existing and had not gotten worse

## 2022-02-08 NOTE — ASU DISCHARGE PLAN (ADULT/PEDIATRIC) - NS MD DC FALL RISK RISK
For information on Fall & Injury Prevention, visit: [email or web address] OR  [email or web address] OR  https://www.cdc.gov/steadi/patient.html

## 2022-02-08 NOTE — ASU DISCHARGE PLAN (ADULT/PEDIATRIC) - ASU DC SPECIAL INSTRUCTIONSFT
WOUND CARE:  Do NOT scrub, rub, or pick at your incision site  AFTER 3 DAYS you may SHOWER  - use mild soap and gentle warm, water stream, pat dry  DO NOT apply lotions, creams, ointments, powder, perfumes to your incision site  DO NOT SOAK your site for 4-6 weeks ( no baths, no pools, no tubs, etc...)  wear loose clothing around site for 1-2 weeks  IF surgical tape was used DO NOT remove the strips, they will fall off after 7days, if glue was used, it will naturally fall off within 3 weeks  if staples were used, they will be removed in 7-10 days by your doctor    ACTIVITY:  for 2 weeks AFTER  your procedure  - DO NOT RAISE your arm above shoulder level ( on the same side of your incision)  for 4 weeks AFTER your procedure   - DO NOT LIFT anything 10 lbs or heavier ( on the side of your implant)   - certain activities may be limited longer, those that involve swinging your arm, and will be discussed with your EP doctor  DO NOT DRIVE until your EP Doctor or nurse practitioner/ physician assistant states it is safe to do so  A follow up appointment in 7-14 days will be arranged before your discharge    ID CARD:   you will receive an ID CARD and device company booklet   - please carry that card with you at all times    ***CALL YOUR DOCTOR ***  IF you have fever, chills, body aches, or severe pain, swelling, redness, heat, yellow drainage from your incision site  IF bleeding  or significant new swelling from your puncture site  IF your experience lightheadedness, dizziness, or fainting spell.  IF unable to get in contact with your doctor, you may call the Cardiology Office at [facility] at [phone]

## 2022-02-08 NOTE — H&P CARDIOLOGY - HISTORY OF PRESENT ILLNESS
Patient is a 56yoF, w/ PMH of asthma, bradycardia (s/p pacemaker placement- [employer] Model Number - PM 2210, Serial number [id number], Implant 3/9/2010 ), h/o myomectomy, bladder CA (on chemo, s/p nephrostomy tube on right kidney, Last exchange in October ), HTN, and HLD, right nephrostomy tube, COVID infection ( 1/21/2022), Recently admitted from 1/15 -1/21/2022 at [facility] with + UTI and COVID infection, completed 4 day course of Ceftriaxone, presented today here for battery exchange of PPM     She reports that she has been having lower back pain for almost a week, bothers her when she is ambulating, Noted that while she voids her urine is pink tinged and cloudy.    Denies dysuria, foul smell. She denies taking any medications including over the counter , has Albuterol rescue inhaler with her but had not used since 2019.       Patient is a 56yoF, w/ PMH of asthma, bradycardia (s/p pacemaker placement- [employer] Model Number - PM 2210, Serial number [id number], Implant 3/9/2010 ), h/o myomectomy, bladder CA (on chemo Gemzar, Carboplatin - Last dose in November  ), HTN, Parathyroid tumor, Liver cyst, Anemia,  HLD, right nephrostomy tube Last tube exchange in October was due in January but because tube was draining good she decided not to do the exchange at the time,  Recently admitted from 1/15 -1/21/2022 at [facility] with + UTI and COVID infection, completed 4 day course of Ceftriaxone, presented today here for battery exchange of PPM .    She reports that she has been having lower back pain for almost a week, bothers her when she is ambulating, Noted that while she voids her urine is pink tinged and cloudy.    Denies dysuria, foul smell. She denies taking any medications including over the counter , has Albuterol rescue inhaler with her but had not used since 2019.       Patient is a 56yoF, w/ PMH of asthma, bradycardia (s/p pacemaker placement- [employer] Model Number - PM 2210, Serial number [id number], Implant 3/9/2010 ), h/o myomectomy, bladder CA (on chemo Gemzar, Carboplatin - Last dose in November  ), HTN, Parathyroid tumor, Liver cyst, Anemia,  HLD, right nephrostomy tube Last tube exchange in October was due in January but because tube was draining good she decided not to do the exchange at the time,  Recently admitted from 1/15 -1/21/2022 at [facility] with + UTI and COVID infection, completed 4 day course of Ceftriaxone, presented today here for battery exchange of PPM .    She reports that she has been having lower back pain for almost a week, bothers her when she is ambulating, Noted that while she voids her urine is pink tinged and cloudy. Dr Rosario made aware.    Denies dysuria, foul smell. She denies taking any medications including over the counter , has Albuterol rescue inhaler with her but had not used since 2019.

## 2022-02-08 NOTE — ASU DISCHARGE PLAN (ADULT/PEDIATRIC) - CARE PROVIDER_API CALL
Priscila Rosario  CARDIOVASCULAR DISEASE  [address], [address]  [place] [address]  Phone: [phone]  Fax: [phone]  Follow Up Time:

## 2022-02-08 NOTE — H&P CARDIOLOGY - COMMENTS
Patient with complaints of lower back pain ( bilateral flanks), pink tinged and cloudy urine - Dr Rosario made aware

## 2022-02-08 NOTE — H&P CARDIOLOGY - NSICDXPASTMEDICALHX_GEN_ALL_CORE_FT
PAST MEDICAL HISTORY:  Anemia     Asthma     Bladder cancer     HLD (hyperlipidemia)     HTN (hypertension)     Hydronephrosis has right Nephrostomy tube - dressing intact    Liver cyst     Pacemaker     Parathyroid tumor      PAST MEDICAL HISTORY:  Anemia     Asthma     Bladder cancer     Bradycardia     HLD (hyperlipidemia)     HTN (hypertension)     Hydronephrosis has right Nephrostomy tube - dressing intact    Liver cyst     Pacemaker     Parathyroid tumor

## 2022-02-08 NOTE — ASU DISCHARGE PLAN (ADULT/PEDIATRIC) - CALL YOUR DOCTOR IF YOU HAVE ANY OF THE FOLLOWING:
bleeding or swelling/Pain not relieved by Medications/Wound/Surgical Site with redness, or foul smelling discharge or pus

## 2022-02-09 NOTE — PROGRESS NOTE ADULT - SUBJECTIVE AND OBJECTIVE BOX
CARDIOLOGY     PROGRESS  NOTE   ________________________________________________    CHIEF COMPLAINT:Patient is a 56y old  Female who presents with a chief complaint of pacemaker generator change (09 Feb 2022 07:15)  no complain.  	  REVIEW OF SYSTEMS:  CONSTITUTIONAL: No fever, weight loss, or fatigue  EYES: No eye pain, visual disturbances, or discharge  ENT:  No difficulty hearing, tinnitus, vertigo; No sinus or throat pain  NECK: No pain or stiffness  RESPIRATORY: No cough, wheezing, chills or hemoptysis; No Shortness of Breath  CARDIOVASCULAR: No chest pain, palpitations, passing out, dizziness, or leg swelling  GASTROINTESTINAL: No abdominal or epigastric pain. No nausea, vomiting, or hematemesis; No diarrhea or constipation. No melena or hematochezia.  GENITOURINARY: No dysuria, frequency, hematuria, or incontinence  NEUROLOGICAL: No headaches, memory loss, loss of strength, numbness, or tremors  SKIN: No itching, burning, rashes, or lesions   LYMPH Nodes: No enlarged glands  ENDOCRINE: No heat or cold intolerance; No hair loss  MUSCULOSKELETAL: No joint pain or swelling; No muscle, back, or extremity pain  PSYCHIATRIC: No depression, anxiety, mood swings, or difficulty sleeping  HEME/LYMPH: No easy bruising, or bleeding gums  ALLERGY AND IMMUNOLOGIC: No hives or eczema	    [ ] All others negative	  [ ] Unable to obtain    PHYSICAL EXAM:  T(C): 36.4 (02-09-22 @ 08:26), Max: 36.9 (02-08-22 @ 20:55)  HR: 65 (02-09-22 @ 08:26) (50 - 76)  BP: 106/67 (02-09-22 @ 08:26) (102/61 - 129/76)  RR: 16 (02-09-22 @ 08:26) (13 - 17)  SpO2: 100% (02-09-22 @ 08:26) (99% - 100%)  Wt(kg): --  I&O's Summary    08 Feb 2022 07:01  -  09 Feb 2022 07:00  --------------------------------------------------------  IN: 520 mL / OUT: 0 mL / NET: 520 mL    09 Feb 2022 07:01  -  09 Feb 2022 10:33  --------------------------------------------------------  IN: 240 mL / OUT: 0 mL / NET: 240 mL        Appearance: Normal	  HEENT:   Normal oral mucosa, PERRL, EOMI	  Lymphatic: No lymphadenopathy  Cardiovascular: Normal S1 S2, No JVD, No murmurs, No edema  Respiratory: Lungs clear to auscultation	  Psychiatry: A & O x 3, Mood & affect appropriate  Gastrointestinal:  Soft, Non-tender, + BS	  Skin: No rashes, No ecchymoses, No cyanosis	  Neurologic: Non-focal  Extremities: Normal range of motion, No clubbing, cyanosis or edema  Vascular: Peripheral pulses palpable 2+ bilaterally  ppm site clean and dry    MEDICATIONS  (STANDING):  dextrose 5%. 500 milliLiter(s) (60 mL/Hr) IV Continuous <Continuous>      TELEMETRY: 	    ECG:  	  RADIOLOGY:  OTHER: 	  	  LABS:	 	    CARDIAC MARKERS:                                11.7   5.76  )-----------( 203      ( 08 Feb 2022 06:52 )             37.4     02-08    139  |  103  |  20  ----------------------------<  80  3.9   |  22  |  1.20    Ca    10.2      08 Feb 2022 06:52      proBNP: Serum Pro-Brain Natriuretic Peptide: 41 pg/mL (01-13 @ 15:26)    Lipid Profile: Cholesterol 226  LDL --  HDL 53      HgA1c:   TSH: Thyroid Stimulating Hormone, Serum: 0.39 uU/mL (01-16 @ 12:26)          Assessment and plan  ---------------------------  s/p ppm gen change  site clean and dry  dc fu next week  keep incision site dry    	        
Subjective/Observations: s/p PPM gen change, Patient and site is stable      REVIEW OF SYSTEMS: All other review of systems is negative unless indicated above    MEDICATIONS  (STANDING):  ceFAZolin   IVPB 1000 milliGRAM(s) IV Intermittent once  dextrose 5%. 500 milliLiter(s) (60 mL/Hr) IV Continuous <Continuous>    MEDICATIONS  (PRN):      Allergies    No Known Allergies    Intolerances      Vital Signs Last 24 Hrs  T(C): 36.5 (09 Feb 2022 04:25), Max: 36.9 (08 Feb 2022 20:55)  T(F): 97.7 (09 Feb 2022 04:25), Max: 98.4 (08 Feb 2022 20:55)  HR: 50 (09 Feb 2022 04:25) (50 - 76)  BP: 124/65 (09 Feb 2022 04:25) (102/61 - 156/84)  BP(mean): 83 (09 Feb 2022 04:25) (75 - 108)  RR: 17 (09 Feb 2022 04:25) (13 - 18)  SpO2: 100% (09 Feb 2022 04:25) (98% - 100%)          I&O's Summary    08 Feb 2022 07:01  -  09 Feb 2022 05:28  --------------------------------------------------------  IN: 520 mL / OUT: 0 mL / NET: 520 mL      Weight (kg): 41.7 (02-08 @ 06:43)    PHYSICAL EXAM:  General: WN/WD NAD  Neurology: Awake, nonfocal, JEONG x 4  Respiratory: CTA B/L, No wheezing, rales, rhonchi  CV: RRR, S1S2, no murmurs, rubs or gallops  Abdominal: Soft, NT, ND +BS,   Extremities: No edema, + peripheral pulses  Skin: No Rashes, Hematoma, Ecchymosis  Psych: Oriented x 3, normal affect      LABS: All Labs Reviewed:                        11.7   5.76  )-----------( 203      ( 08 Feb 2022 06:52 )             37.4     08 Feb 2022 06:52    139    |  103    |  20     ----------------------------<  80     3.9     |  22     |  1.20     Ca    10.2       08 Feb 2022 06:52

## 2022-02-09 NOTE — PROGRESS NOTE ADULT - ASSESSMENT
56yoF, w/ PMH of asthma, bradycardia (s/p pacemaker placement- [employer] Model Number - PM 2210, Serial number [id number], Implant 3/9/2010 ), h/o myomectomy, bladder CA (on chemo Gemzar, Carboplatin - Last dose in November  ), HTN, Parathyroid tumor, Liver cyst, Anemia,  HLD, right nephrostomy tube Last tube exchange in October was due in January but because tube was draining good she decided not to do the exchange at the time,  Recently admitted from 1/15 -1/21/2022 at [facility] with + UTI and COVID infection, completed 4 day course of Ceftriaxone, presented today here for battery exchange of PPM .

## 2022-02-09 NOTE — PROGRESS NOTE ADULT - PROBLEM SELECTOR PLAN 1
- Patient with PPM. s/p PPM generator change 02/08.   - Complete last 2 doses of Ancef  - Okay to dc in AM if patient and site is stable

## 2022-02-09 NOTE — DISCHARGE NOTE PROVIDER - PROVIDER TOKENS
PROVIDER:[TOKEN:[[id number]],FOLLOWUP:[2 weeks]] PROVIDER:[TOKEN:[[id number]],FOLLOWUP:[1 week]]

## 2022-02-09 NOTE — DISCHARGE NOTE NURSING/CASE MANAGEMENT/SOCIAL WORK - PATIENT PORTAL LINK FT
You can access the FollowMyHealth Patient Portal offered by [facility] by registering at the following website: [email or web address]. By joining [employer]’s FollowMyHealth portal, you will also be able to view your health information using other applications (apps) compatible with our system.

## 2022-02-09 NOTE — DISCHARGE NOTE PROVIDER - CARE PROVIDER_API CALL
Priscila Rosario  CARDIOVASCULAR DISEASE  [address], [address]  [place] [address]  Phone: [phone]  Fax: [phone]  Follow Up Time: 2 weeks   Priscila Rosario  CARDIOVASCULAR DISEASE  [address], [address]  [place] [address]  Phone: [phone]  Fax: [phone]  Follow Up Time: 1 week

## 2022-02-09 NOTE — DISCHARGE NOTE PROVIDER - NSDCCPTREATMENT_GEN_ALL_CORE_FT
PRINCIPAL PROCEDURE  Procedure: Replacement of dual chamber pacemaker generator  Findings and Treatment: see discharge sheet given to you by your nurse for post procedure instruction

## 2022-02-09 NOTE — DISCHARGE NOTE PROVIDER - HOSPITAL COURSE
Patient is a 56yoF, w/ PMH of asthma, bradycardia (s/p pacemaker placement- [employer] Model Number - PM 2210, Serial number [id number], Implant 3/9/2010 ), h/o myomectomy, bladder CA (on chemo Gemzar, Carboplatin - Last dose in November  ), HTN, Parathyroid tumor, Liver cyst, Anemia,  HLD, right nephrostomy tube Last tube exchange in October was due in January but because tube was draining good she decided not to do the exchange at the time,  Recently admitted from 1/15 -1/21/2022 at [facility] with + UTI and COVID infection, completed 4 day course of Ceftriaxone, presented today here for battery exchange of PPM .    She reports that she has been having lower back pain for almost a week, bothers her when she is ambulating, Noted that while she voids her urine is pink tinged and cloudy. Dr Rosario made aware.    Denies dysuria, foul smell. She denies taking any medications including over the counter , has Albuterol rescue inhaler with her but had not used since 2019.

## 2022-02-23 PROBLEM — N13.30 UNSPECIFIED HYDRONEPHROSIS: Chronic | Status: ACTIVE | Noted: 2021-07-19

## 2022-02-23 PROBLEM — R00.1 BRADYCARDIA, UNSPECIFIED: Chronic | Status: ACTIVE | Noted: 2022-01-01

## 2022-02-23 NOTE — ED PROVIDER NOTE - CLINICAL SUMMARY MEDICAL DECISION MAKING FREE TEXT BOX
Patient presenting for chills. vital stable. afebrile. well appearing. no signs of wound infection on exam. will obtain lab, ua, covid swab. assess for infection. ed obs and reassess

## 2022-02-23 NOTE — ED PROVIDER NOTE - OBJECTIVE STATEMENT
56 year old female with PMHx of bradycardia and hypertension S/P left cardiac pacemaker placement presenting to the ED with complaints of chills. Patient reports onset of her symptoms several days after the battery for her pacemaker was replaced. Patient additionally notes some nausea. Patient otherwise denies any measured temperature, cough, diarrhea, dysuria, and all other acute complaints. NKDA.

## 2022-02-23 NOTE — ED ADULT NURSE NOTE - ED STAT RN HANDOFF DETAILS
Report given to BRENDEN BLACKMAN, no acute distress noted, denies chest pain, no shortness of breath indicated.

## 2022-02-23 NOTE — ED PROVIDER NOTE - CARDIAC, MLM
Normal rate, regular rhythm.  Heart sounds S1, S2.  No murmurs, rubs or gallops. Pacemaker appears to be in place with no surrounding erythema or warmth.

## 2022-02-23 NOTE — ED PROVIDER NOTE - PROGRESS NOTE DETAILS
Patient lab wnl except for + u/a. patient well appearing. clinically does not appear septic. last chemo 2 months ago, no concern for neurtropenic fever given normal wbc and no recent chemo. given concern for hospitalise acquired infection/covid, risk and benefit of oral vs iv abx discussed, patient discharged for trial of oral abx. instructed to return if noting new/worsening symptoms. instructed to f.u pmd, cardiologist and urologist. patient hemodynamically stable on dc, endorses understanding on plans

## 2022-02-23 NOTE — ED ADULT NURSE NOTE - OBJECTIVE STATEMENT
Pt s/p surgery on 02/08 - PACE MAKER battery replaced, Pt c/o chills , nausea and B/L Flank pain. No acute distress noted, nephrostomy tube noted to right lower back, dressing dry/intact. Pt denies chest pain, no shortness of breath indicated.

## 2022-02-23 NOTE — ED PROVIDER NOTE - NSICDXPASTMEDICALHX_GEN_ALL_CORE_FT
PAST MEDICAL HISTORY:  Anemia     Asthma     Bladder cancer     Bradycardia     HLD (hyperlipidemia)     HTN (hypertension)     Hydronephrosis has right Nephrostomy tube - dressing intact    Liver cyst     Pacemaker     Parathyroid tumor

## 2022-02-23 NOTE — ED PROVIDER NOTE - PATIENT PORTAL LINK FT
You can access the FollowMyHealth Patient Portal offered by [facility] by registering at the following website: [email or web address]. By joining [employer]’s FollowMyHealth portal, you will also be able to view your health information using other applications (apps) compatible with our system.

## 2022-02-23 NOTE — ED PROVIDER NOTE - WR ORDER STATUS 1
[FreeTextEntry1] : 80 yo female with IPF. Treatment with esbriet as ordered and tolerated. She is to continue 6 tablets daily for now. She is to avoid sun exposure. LFT will be followed up by her PMD. Repeat PFT in the future. Performed

## 2022-03-02 NOTE — H&P ADULT - HISTORY OF PRESENT ILLNESS
Patient is a 56F, w/ PMHx of bradycardia (s/p pacemaker placement- [employer] Model Number - PM 2210, Serial number [id number], Implant 3/9/2010, most recent battery change 2/8/2022), asthma, myomectomy, bladder CA (on chemo Gemzar, Carboplatin, s/p nephrostomy tube on right kidney), Parathyroid tumor, HTN, and HLD, who came in with 5 day history of intermittent chest pain and low sugars. The chest pain comes intermittently and lasts few minutes per episode. It radiates to L chin, L chest under the breast, and R chest, and is exacerbated by exertion. Also, it is accompanied by SOB.  Patient admits to not eating much carbohydrates in her meals due to fear of cancer growth. She has many episodes of hypoglycemia at home, in which she gets lightheadedness, and it improves when she takes sugar.    Patient endorses soreness on her back and mild dysuria(chronically) due to her passing clots. She was here on 2/23/22 for UTI and was discharged with cefpodoxime. She had flank pain and worse dysuria at that time.

## 2022-03-02 NOTE — ED PROVIDER NOTE - PROGRESS NOTE DETAILS
jacob  spoke with pt and dr. sheppard  pt with multiple episodes of hypoglycemia, pt agrees to admission

## 2022-03-02 NOTE — CONSULT NOTE ADULT - ASSESSMENT
Patient is a 56F, w/ PMHx of bradycardia (s/p pacemaker placement- [employer] Model Number - PM 2210, Serial number [id number], Implant 3/9/2010, most recent battery change 2/8/2022), asthma, myomectomy, bladder CA (on chemo Gemzar, Carboplatin, s/p nephrostomy tube on right kidney), Parathyroid tumor, HTN, and HLD, who came in with 5 day history of intermittent chest pain and low sugars. The chest pain comes intermittently and lasts few minutes per episode. It radiates to L chin, L chest under the breast, and R chest, and is exacerbated by exertion. Also, it is accompanied by SOB.  Patient admits to not eating much carbohydrates in her meals due to fear of cancer growth. She has many episodes of hypoglycemia at home, in which she gets lightheadedness, and it improves when she takes sugar.  chest pain, doubt cardiac  no need for any cardiac testing m ?musculoskeletal  no pe, neg D dimer  endo eval

## 2022-03-02 NOTE — H&P ADULT - PROBLEM SELECTOR PLAN 8
- H/o HTN, not on any meds  - Monitor BP - H/o HLD, not on any meds  - Lipid panel 01/2022 = LDL mildly elevated.

## 2022-03-02 NOTE — H&P ADULT - PROBLEM SELECTOR PLAN 6
- H/o parathyroid tumor, plan for surgical management in the future  - Follows an outpatient endocrinologist - CXR = several bilateral nodular areas projecting over the lung fields. 2 of these could represent nipples but not all 4. They can be seen on February 23. The largest on the left measures approximately 2 cm. These are new since July 19, 2021. Cannot rule out lung metastases. Consider CT chest - CXR = several bilateral nodular areas projecting over the lung fields  - Follows outpatient oncologist Dr. Kurtis Hoyt ([phone])

## 2022-03-02 NOTE — H&P ADULT - PROBLEM SELECTOR PLAN 4
- H/o bladder CA (on chemo Gemzar, Carboplatin, s/p right nephrostomy)  - Last chemo was 12/2021. Next one scheduled 3/8/2022  - Last tube change was due January 2022, but did not due it yet due to her recent COVID infection and pacemaker battery change  - Supportive care - H/o bladder CA (on chemo Gemzar, Carboplatin, s/p right nephrostomy)  - Last chemo was 12/2021. Next one scheduled 3/8/2022  - Last tube change was due January 2022, but did not due it yet due to her recent COVID infection and pacemaker battery change  - Follows outpatient oncologist Dr. Kurtis Hoyt ([phone])

## 2022-03-02 NOTE — H&P ADULT - PROBLEM SELECTOR PLAN 3
- Positive UA + flank pain + worsened dysuria on 2/23/22  - Discharged on 2/23/22 with Cefpodoxime until 3/4/2022  - Patient reports improved symptoms  - Continue Cefpodoxime until 3/4/2022  - F/u UA

## 2022-03-02 NOTE — H&P ADULT - PROBLEM SELECTOR PLAN 1
- Not diabetic, not on any meds  - Patient admits to not eating much carbohydrates in her meals due to fear of cancer growth  - Gets lightheadedness during episodes, and it improves when she takes sugar  - FS q4hrs  - D10 250mL PRN for hypoglycemia (D50 not available in pharmacy [facility])  - A1c 5.5 in 01/2022 - Not diabetic, not on any meds  - Patient admits to not eating much carbohydrates in her meals due to fear of cancer growth  - Gets lightheadedness during episodes, and it improves when she takes sugar  - FS q4hrs  - A1c 5.5 in 01/2022 - Not diabetic, not on any meds  - Patient admits to not eating much carbohydrates in her meals due to fear of cancer growth  - Gets lightheadedness during episodes, and it improves when she takes sugar  - FS q4hrs  - A1c 5.5 in 01/2022  - Encourage eating

## 2022-03-02 NOTE — H&P ADULT - PROBLEM SELECTOR PLAN 7
- H/o HLD, not on any meds  - Lipid panel 01/2022 = LDL mildly elevated. - H/o parathyroid tumor, plan for surgical management in the future  - Follows an outpatient endocrinologist

## 2022-03-02 NOTE — H&P ADULT - ASSESSMENT
Patient is a 56F, w/ PMHx of bradycardia (s/p pacemaker placement- [employer] Model Number - PM 2210, Serial number [id number], Implant 3/9/2010, most recent battery change 2/8/2022), asthma, myomectomy, bladder CA (on chemo Gemzar, Carboplatin, s/p nephrostomy tube on right kidney), Parathyroid tumor, HTN, and HLD, who came in with 5 day history of intermittent chest pain and low sugars. Admitted for hypoglycemia and chest pain.

## 2022-03-02 NOTE — ED PROVIDER NOTE - OBJECTIVE STATEMENT
pt with complaint of chest pain  pt is undergoing chemo for bladder ca and is s/p nephrostomy tube also hx of hypoparathyroid, has asthma and htn.  recently had battery replaced in PPM which she has for symptomatic bradycardia  in ED pt noted to have a blood sugar of 44  which went up to 88 after she ate.  she admits that she has had low PO intake and has had multiple episodes of low blood sugar  not on any oral hypoglycemics

## 2022-03-02 NOTE — ED PROVIDER NOTE - CLINICAL SUMMARY MEDICAL DECISION MAKING FREE TEXT BOX
pt with hs of blader ca currently on chemo with significant decrease in PO intake and now with cp and multiple episodes of hypoglycemia.  will get labs, ekg, trop and offer PO food as awake and can tolerate  will admit

## 2022-03-02 NOTE — ED ADULT NURSE NOTE - OBJECTIVE STATEMENT
Patient AOx3, calm, cooperative, in no acute distress, reports chest pain worse with ambulation. Patient denies shortness of breath.

## 2022-03-02 NOTE — ED PROVIDER NOTE - CHPI ED SYMPTOMS NEG
no back pain/no cough/no dizziness/no fever/no nausea/no shortness of breath/no syncope/no vomiting/no diaphoresis

## 2022-03-02 NOTE — H&P ADULT - NSHPADDITIONALINFOADULT_GEN_ALL_CORE
Patient evaluated, case discussed with me, chart reviewed, agree with above H/P as reviewed.  Dr. LUIS ANGEL Ochoa ([phone])

## 2022-03-02 NOTE — H&P ADULT - NSHPREVIEWOFSYSTEMS_GEN_ALL_CORE
CONSTITUTIONAL: No fever, weight loss, or fatigue  EYES: No eye pain, visual disturbances, or discharge  ENT:  No difficulty hearing, tinnitus, vertigo; No sinus or throat pain  NECK: No pain or stiffness  RESPIRATORY: No cough, wheezing, chills or hemoptysis; (+)Shortness of Breath  CARDIOVASCULAR: (+)chest pain. No palpitations, passing out, dizziness, or leg swelling  GASTROINTESTINAL: No abdominal or epigastric pain. No nausea, vomiting, or hematemesis; No diarrhea or constipation. No melena or hematochezia.  GENITOURINARY: No dysuria, frequency, hematuria, or incontinence  NEUROLOGICAL: No headaches, memory loss, loss of strength, numbness, or tremors  SKIN: No itching, burning, rashes, or lesions   LYMPH Nodes: No enlarged glands  ENDOCRINE: No heat or cold intolerance; No hair loss  MUSCULOSKELETAL: No joint pain or swelling; No muscle, back, No extremity pain  PSYCHIATRIC: No depression, anxiety, mood swings, or difficulty sleeping  HEME/LYMPH: No easy bruising, or bleeding gums  ALLERGY AND IMMUNOLOGIC: No hives or eczema

## 2022-03-02 NOTE — H&P ADULT - NSICDXFAMILYHX_GEN_ALL_CORE_FT
Patient states her right side is feeling very tender and starting to feel swollen (in addition to left).  Exam shows swelling in area of right Bartholin. Left side is inflamed but smaller than 2 days ago.  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 Not sure if they have always been on the left, maybe some on the right. Desires bilateral marsupialization so she does not have to deal with this in the future.  Plan for marsupialization of bilateral Bartholin's today.  Amara Thakur MD     FAMILY HISTORY:  Father  Still living? Unknown  Family history of atrial fibrillation, Age at diagnosis: Age Unknown  Family history of CHF (congestive heart failure), Age at diagnosis: Age Unknown  Family history of prostate cancer, Age at diagnosis: Age Unknown

## 2022-03-02 NOTE — H&P ADULT - NSHPPHYSICALEXAM_GEN_ALL_CORE
GENERAL: NAD, well-groomed, well-developed  HEAD:  Atraumatic, Normocephalic  EYES: EOMI, PERRLA, conjunctiva and sclera clear  ENMT: No tonsillar erythema, exudates, or enlargement; Moist mucous membranes, Good dentition, No lesions  NECK: Supple, normal appearance, No JVD; Normal thyroid; Trachea midline  NERVOUS SYSTEM:  Alert & Oriented X3,  Motor Strength 5/5 B/L upper and lower extremities, sensation intact  CHEST/LUNG: L pacemaker site scar. Lungs clear to auscultation bilaterally, No rales, rhonchi, wheezing   HEART: Regular rate and rhythm; No murmurs, rubs, or gallops  ABDOMEN: R Soft, Nontender, Nondistended; Bowel sounds present  EXTREMITIES:  2+ Peripheral Pulses, No clubbing, cyanosis, or edema  LYMPH: No lymphadenopathy noted  SKIN: No rashes or lesions;  Good capillary refill Height (cm): 162.6  Weight (kg): 41.3  BMI (kg/m2): 15.6  Vital Signs Last 24 HrsT(C): 36.7 (03-02-22 @ 16:01)  T(F): 98.1 (03-02-22 @ 16:01), Max: 98.3 (03-02-22 @ 11:07)  HR: 63 (03-02-22 @ 16:01) (62 - 68)  BP: 151/81 (03-02-22 @ 16:01)  RR: 18 (03-02-22 @ 16:01) (18 - 20)  SpO2: 97% (03-02-22 @ 16:01) (97% - 100%)      CAPILLARY BLOOD GLUCOSE  POCT Blood Glucose.: 82 mg/dL (02 Mar 2022 16:12)  POCT Blood Glucose.: 88 mg/dL (02 Mar 2022 12:24)  POCT Blood Glucose.: 44 mg/dL (02 Mar 2022 11:39)      GENERAL: NAD, well-groomed, well-developed  HEAD:  Atraumatic, Normocephalic  EYES: EOMI, PERRLA, conjunctiva and sclera clear  ENMT: No tonsillar erythema, exudates, or enlargement; Moist mucous membranes, Good dentition, No lesions  NECK: Supple, normal appearance, No JVD; Normal thyroid; Trachea midline  NERVOUS SYSTEM:  Alert & Oriented X3,  Motor Strength 5/5 B/L upper and lower extremities, sensation intact  CHEST/LUNG: L pacemaker site scar. Lungs clear to auscultation bilaterally, No rales, rhonchi, wheezing   HEART: Regular rate and rhythm; No murmurs, rubs, or gallops  ABDOMEN: R Soft, Nontender, Nondistended; Bowel sounds present  EXTREMITIES:  2+ Peripheral Pulses, No clubbing, cyanosis, or edema  LYMPH: No lymphadenopathy noted  SKIN: No rashes or lesions;  Good capillary refill

## 2022-03-02 NOTE — H&P ADULT - PROBLEM SELECTOR PLAN 5
- H/o symptomatic bradycardia, s/p pacemaker placement  - [employer] Model Number - PM 2210, Serial number [id number], Implant 3/9/2010  - Last battery change 2/8/2022

## 2022-03-02 NOTE — H&P ADULT - PROBLEM SELECTOR PLAN 2
- Intermittent chest pain lasting for a few minutes with SOB. Worsened by exertion  - D dimer = negative  - Trop = negative x1  - EKG =   - Admit to telemetry  - F/u pacemaker interrogation by the primary team  - Cardio Dr. Rosario consulted - Intermittent chest pain lasting for a few minutes with SOB. Worsened by exertion  - Hx of pacemaker due to bradycardia  - D dimer = negative  - Trop = negative x1  - EKG = sinus bradycardia  - Admit to telemetry  - F/u pacemaker interrogation by the primary team  - Cardio Dr. Rosario consulted - Intermittent chest pain lasting for a few minutes with SOB. Worsened by exertion  - Hx of pacemaker due to bradycardia  - D dimer = negative  - Trop = negative x1  - EKG = sinus bradycardia  - Admit to telemetry  -f/u echo  - F/u pacemaker interrogation by the primary team  - Cardio Dr. Rosario consulted

## 2022-03-03 NOTE — PROGRESS NOTE ADULT - SUBJECTIVE AND OBJECTIVE BOX
HPI:  56 YOF admitted with hypoglycemia.  Nutrition consulted.    OVERNIGHT EVENTS:  No new overnight events.  Seen and examined at bedside.     REVIEW OF SYSTEMS:      CONSTITUTIONAL: No fever  EYES: no acute visual disturbances  NECK: No pain or stiffness  RESPIRATORY: No cough; No shortness of breath  CARDIOVASCULAR: + chest pain, no palpitations  GASTROINTESTINAL: No pain. No nausea, vomiting or diarrhea   NEUROLOGICAL: No headache or numbness, no tremors  MUSCULOSKELETAL: No joint pain, no muscle pain  GENITOURINARY: no dysuria, no frequency, no hesitancy  PSYCHIATRY: no depression, no anxiety  ALL OTHER  ROS negative        Vital Signs Last 24 Hrs  T(C): 36.4 (03 Mar 2022 10:54), Max: 36.9 (03 Mar 2022 01:06)  T(F): 97.5 (03 Mar 2022 10:54), Max: 98.5 (03 Mar 2022 01:06)  HR: 72 (03 Mar 2022 10:54) (56 - 72)  BP: 108/69 (03 Mar 2022 10:54) (108/69 - 151/81)  BP(mean): --  RR: 18 (03 Mar 2022 10:54) (18 - 18)  SpO2: 98% (03 Mar 2022 10:54) (97% - 100%)    ________________________________________________  PHYSICAL EXAM:    GENERAL: NAD  HEENT: Normocephalic; conjunctivae and sclerae clear;  NECK : supple, no JVD  CHEST/LUNG: Clear to auscultation; Nonlabored  HEART: S1 S2  regular  ABDOMEN: Soft, Nontender, Nondistended; Bowel sounds present  EXTREMITIES: no cyanosis; no LE edema; no calf tenderness  SKIN: warm and dry; No rashes or lesions  NERVOUS SYSTEM:  Alert; no new deficits    _________________________________________________  CURRENT MEDICATIONS:    MEDICATIONS  (STANDING):  cefpodoxime 100 milliGRAM(s) Oral every 12 hours  cholecalciferol 1000 Unit(s) Oral daily  enoxaparin Injectable 40 milliGRAM(s) SubCutaneous every 24 hours    MEDICATIONS  (PRN):  acetaminophen     Tablet .. 650 milliGRAM(s) Oral every 6 hours PRN Temp greater or equal to 38C (100.4F), Mild Pain (1 - 3), Moderate Pain (4 - 6)  melatonin 3 milliGRAM(s) Oral at bedtime PRN Insomnia  ondansetron Injectable 4 milliGRAM(s) IV Push every 8 hours PRN Nausea and/or Vomiting      __________________________________________________  LABS:                          12.2   6.03  )-----------( 230      ( 03 Mar 2022 05:45 )             38.5     03-03    146<H>  |  112<H>  |  19<H>  ----------------------------<  83  4.5   |  29  |  1.23    Ca    9.8      03 Mar 2022 05:45  Phos  3.8     03-03  Mg     2.3     03-03    TPro  7.5  /  Alb  3.3<L>  /  TBili  0.3  /  DBili  x   /  AST  16  /  ALT  19  /  AlkPhos  98  03-03        CAPILLARY BLOOD GLUCOSE      POCT Blood Glucose.: 161 mg/dL (03 Mar 2022 10:37)  POCT Blood Glucose.: 86 mg/dL (03 Mar 2022 04:46)  POCT Blood Glucose.: 101 mg/dL (03 Mar 2022 00:50)  POCT Blood Glucose.: 82 mg/dL (02 Mar 2022 16:12)  POCT Blood Glucose.: 88 mg/dL (02 Mar 2022 12:24)  POCT Blood Glucose.: 44 mg/dL (02 Mar 2022 11:39)      __________________________________________________  RADIOLOGY & ADDITIONAL TESTS:    Imaging Personally Reviewed:  YES    < from: Xray Chest 2 Views PA/Lat (03.02.22 @ 12:05) >  IMPRESSION: Cannot rule out lung metastases. Consider CT chest. A note   was posted on the [facility] ED discrepancy site at 2:41 PM.    < end of copied text >    Consultant(s) Notes Reviewed:   YES     Plan of care was discussed with patient and /or primary care giver; all questions and concerns were addressed and care was aligned with patient's wishes.    Plan discussed with attending and consulting physicians.

## 2022-03-03 NOTE — PATIENT PROFILE ADULT - FALL HARM RISK - UNIVERSAL INTERVENTIONS
Bed in lowest position, wheels locked, appropriate side rails in place/Call bell, personal items and telephone in reach/Instruct patient to call for assistance before getting out of bed or chair/Non-slip footwear when patient is out of bed/[place] to call system/Physically safe environment - no spills, clutter or unnecessary equipment/Purposeful Proactive Rounding/Room/bathroom lighting operational, light cord in reach

## 2022-03-03 NOTE — PROGRESS NOTE ADULT - ASSESSMENT
_________________________________________________________________________________________  ========>>  M E D I C A L   A T T E N D I N G    F O L L O W  U P  N O T E  <<=========  -----------------------------------------------------------------------------------------------------    - Patient seen and examined by me earlier today.   - In summary,  JEF HOUSE is a 56y year old woman admitted with CP and hypoglycemia   - Patient today overall doing ok, comfortable, eating OK.     ==================>> REVIEW OF SYSTEM <<=================    GEN: no fever, no chills, no pain at this time   RESP: no SOB, no cough, no sputum  CVS: no chest pain, no palpitations, no edema  GI: no abdominal pain, no nausea, no constipation, no diarrhea  : no dysuria, no frequency, no hematuria  Neuro: no headache, no dizziness  Derm : no itching, no rash    ==================>> PHYSICAL EXAM <<=================    GEN: A&O X 3 , NAD , comfortable, pleasant, calm   HEENT: NCAT, PERRL, MMM, hearing intact  Neck: supple , no JVD appreciated  CVS: S1S2 , regular , No M/R/G appreciated  PULM: CTA B/L,  no W/R/R appreciated  ABD.: soft. non tender, non distended,  bowel sounds present  Extrem: intact pulses , no edema   PSYCH : normal mood,  not anxious                            ( Note Written / Date of service :  03-03-22 )    ==================>> MEDICATIONS <<====================    MEDICATIONS  (STANDING):  cefTRIAXone   IVPB 1000 milliGRAM(s) IV Intermittent every 24 hours  cholecalciferol 1000 Unit(s) Oral daily  enoxaparin Injectable 40 milliGRAM(s) SubCutaneous every 24 hours    MEDICATIONS  (PRN):  acetaminophen     Tablet .. 650 milliGRAM(s) Oral every 6 hours PRN Temp greater or equal to 38C (100.4F), Mild Pain (1 - 3), Moderate Pain (4 - 6)  melatonin 3 milliGRAM(s) Oral at bedtime PRN Insomnia  ondansetron Injectable 4 milliGRAM(s) IV Push every 8 hours PRN Nausea and/or Vomiting    ___________  Active diet:  Diet, Regular:   No Beef  No Caffeine  No Dairy  No Pork  No Tomatoes  ___________________    ==================>> VITAL SIGNS <<==================    T(C): 36.4 (03-03-22 @ 20:29), Max: 37.1 (03-03-22 @ 11:32)  HR: 54 (03-03-22 @ 20:29) (54 - 72)  BP: 120/66 (03-03-22 @ 20:29) (106/60 - 127/79)  RR: 18 (03-03-22 @ 20:29) (17 - 18)  SpO2: 100% (03-03-22 @ 20:29) (98% - 100%)     POCT Blood Glucose.: 99 mg/dL (03 Mar 2022 21:48)  POCT Blood Glucose.: 112 mg/dL (03 Mar 2022 18:41)  POCT Blood Glucose.: 110 mg/dL (03 Mar 2022 14:29)  POCT Blood Glucose.: 161 mg/dL (03 Mar 2022 10:37)  POCT Blood Glucose.: 86 mg/dL (03 Mar 2022 04:46)  POCT Blood Glucose.: 101 mg/dL (03 Mar 2022 00:50)     ==================>> LAB AND IMAGING <<==================                        12.2   6.03  )-----------( 230      ( 03 Mar 2022 05:45 )             38.5        03-03    146<H>  |  112<H>  |  19<H>  ----------------------------<  83  4.5   |  29  |  1.23    Ca    9.8      03 Mar 2022 05:45  Phos  3.8     03-03  Mg     2.3     03-03    TPro  7.5  /  Alb  3.3<L>  /  TBili  0.3  /  DBili  x   /  AST  16  /  ALT  19  /  AlkPhos  98  03-03    TSH:      0.39   (01-16-22)           HgA1C:   (01-16-22)          (01-16-22)      5.5    _______________________  C U L T U R E S :    Culture - Urine (collected 23 Feb 2022 21:06)  Source: Clean Catch Clean Catch (Midstream)  Final Report (24 Feb 2022 17:30):    <10,000 CFU/mL Normal Urogenital Vandana    Culture - Blood (collected 23 Feb 2022 20:54)  Source: .Blood Blood  Final Report (28 Feb 2022 21:01):    No Growth Final    Culture - Blood (collected 23 Feb 2022 20:53)  Source: .Blood Blood  Final Report (28 Feb 2022 21:01):    No Growth Final    COVID-19 PCR: NotDetec (03-02-22 @ 15:54)  SARS-CoV-2: NotDetec (02-23-22 @ 18:37)    ___________________________________________________________________________________  ===============>>  A S S E S S M E N T   A N D   P L A N <<===============  ------------------------------------------------------------------------------------------    · Assessment	  Patient is a 56F, w/ PMHx of bradycardia (s/p pacemaker placement- [employer] Model Number - PM 2210, Serial number [id number], Implant 3/9/2010, most recent battery change 2/8/2022), asthma, myomectomy, bladder CA (on chemo Gemzar, Carboplatin, s/p nephrostomy tube on right kidney), Parathyroid tumor, HTN, and HLD, who came in with 5 day history of intermittent chest pain and low sugars. Admitted for hypoglycemia and chest pain.     Problem/Plan - 1:  ·  Problem: Hypoglycemia.   ·  Plan: Pt reports decreased carbohydrates intake secondary to concerns of cancer growth  Increased protein and fats intake education provided extensively by me   Nutritionist consulted  Monitor glucose.     Problem/Plan - 2:  ·  Problem: Chest pain.  unclear etiology   ·  Plan: Cardiac etiology ruled out by cardiology  Continue pain mgmt  Dr. Rosario, cardiology, following.     Problem/Plan - 3:  ·  Problem: UTI (urinary tract infection).   ·  Plan: UA +  Urine culture negative   Continue Cefpodoxime until 3/4.     Problem/Plan - 4:  ·  Problem: Bladder cancer.   ·  Plan: On chemo Gemzar, Carboplatin, s/p right nephrostomy  Last chemo 12/2021  Next chemo scheduled 3/8/22  Last nephrostomy tube change was due January 2022  Has not had changed since secondary to her recent COVID infection and pacemaker battery change  Pt follows Dr. Kurtis Hoyt ([phone]), Oncology, as outpatient.     Problem/Plan - 5:  ·  Problem: Pacemaker.   ·  Plan: Secondary to symptomatic bradycardia  Pt has [employer] Model Number - PM 2210, Serial number [id number], Implant 3/9/2010  Last battery change 2/8/2022. > healing well      Problem/Plan - 6:  ·  Problem: Multiple lung nodules.   ·  Plan: CXR noted above  Follow up outpatient w/Dr. Hoyt.     Problem/Plan - 7:  ·  Problem: Parathyroid tumor.   ·  Plan: Plan for surgical management in the future  Follows an outpatient endocrinologist.     Problem/Plan - 8:  ·  Problem: HLD (hyperlipidemia).   ·  Plan: Not on any meds at home  Continue supportive care.     Problem/Plan - 9:  ·  Problem: HTN (hypertension).   ·  Plan: Controlled  Not on any meds  Monitor BP.     Problem/Plan - 10:  ·  Problem: Prophylactic measure.   ·  Plan; DVT PPX: Lovenox.    --------------------------------------------  Case discussed with pt, med team..   Education given on findings and plan of care  ___________________________  H. NIEVES Ochoa.  Pager: [phone]

## 2022-03-03 NOTE — PATIENT PROFILE ADULT - FUNCTIONAL ASSESSMENT - BASIC MOBILITY 6.
Detail Level: Zone
Plan: Schedule 2 to 3 month follow-up appointment
Continue Regimen: Clindamycin solution Q AM, Tazorac 0.1% cream QHS, wash with sulfa-cleanse daily
4 = No assist / stand by assistance

## 2022-03-03 NOTE — PROGRESS NOTE ADULT - ASSESSMENT
Patient is a 56F, w/ PMHx of bradycardia (s/p pacemaker placement- [employer] Model Number - PM 2210, Serial number [id number], Implant 3/9/2010, most recent battery change 2/8/2022), asthma, myomectomy, bladder CA (on chemo Gemzar, Carboplatin, s/p nephrostomy tube on right kidney), Parathyroid tumor, HTN, and HLD, who came in with 5 day history of intermittent chest pain and low sugars. Admitted for hypoglycemia and chest pain.

## 2022-03-03 NOTE — PATIENT PROFILE ADULT - NSPROPTRIGHTCAREGIVER_GEN_A_NUR
aspiration ? pt. is on choking precautions at her facility as can stuff food in her mouth. will cover with vanco , zosyn  for gram positive , gram negative coverage. aspiration ? pt. is on choking precautions at her facility as can stuff food in her mouth. will cover with vanco  zosyn  for gram positive , gram negative coverage.  small pericardial effusion reported on ct chest, pt. hemodynamically stable, will get echo for better evaluation.  small sub pleural left lower lobe 5 mm nodule, repeat ct chest in 4 to 6 week recommended to follow up for stability or resolution recommended by radiologist. declines

## 2022-03-04 NOTE — DIETITIAN INITIAL EVALUATION ADULT. - PROBLEM SELECTOR PLAN 4
- H/o bladder CA (on chemo Gemzar, Carboplatin, s/p right nephrostomy)  - Last chemo was 12/2021. Next one scheduled 3/8/2022  - Last tube change was due January 2022, but did not due it yet due to her recent COVID infection and pacemaker battery change  - Follows outpatient oncologist Dr. Kurtis Hoyt ([phone])

## 2022-03-04 NOTE — DIETITIAN INITIAL EVALUATION ADULT. - PROBLEM SELECTOR PLAN 1
- Not diabetic, not on any meds  - Patient admits to not eating much carbohydrates in her meals due to fear of cancer growth  - Gets lightheadedness during episodes, and it improves when she takes sugar  - FS q4hrs  - A1c 5.5 in 01/2022  - Encourage eating

## 2022-03-04 NOTE — DIETITIAN INITIAL EVALUATION ADULT. - PROBLEM SELECTOR PLAN 5
- H/o symptomatic bradycardia, s/p pacemaker placement  - [employer] Model Number - PM 2210, Serial number [id number], Implant 3/9/2010  - Last battery change 2/8/2022

## 2022-03-04 NOTE — PROGRESS NOTE ADULT - ASSESSMENT
56F, w/ PMHx of bradycardia (s/p pacemaker placement- [employer] Model Number - PM 2210, Serial number [id number], Implant 3/9/2010, most recent battery change 2/8/2022), asthma, myomectomy, bladder CA (on chemo Gemzar, Carboplatin, s/p nephrostomy tube on right kidney), Parathyroid tumor, HTN, and HLD, who came in with 5 day history of intermittent chest pain and low sugars. Admitted for hypoglycemia and chest pain.  Initially monitored on tele then transferred to medicine   Chest Xray with suspicion for lung metastasis, pending CT chest, abd and pelvis.

## 2022-03-04 NOTE — DIETITIAN INITIAL EVALUATION ADULT. - OTHER INFO
Reports weight loss of 20% from usual in 3 months (52 To 41kg) which is significant. No complaints of Nausea, vomiting or diarrhea. No problems with chewing or swallowing food . No food allergies but does have food that cannot tolerate, No beef, No caffeine, No dairy, No pork, No tomatoes as in diet order. Cannot tolerate ensure supplementation either. will diagnose patient as severely malnourished due To poor po intake PTA and wt loss.

## 2022-03-04 NOTE — DIETITIAN NUTRITION RISK NOTIFICATION - TREATMENT: THE FOLLOWING DIET HAS BEEN RECOMMENDED
Diet, Regular:   No Beef  No Caffeine  No Dairy  No Pork  No Tomatoes (03-02-22 @ 15:40) [Active]

## 2022-03-04 NOTE — DIETITIAN INITIAL EVALUATION ADULT. - PROBLEM SELECTOR PLAN 2
- Intermittent chest pain lasting for a few minutes with SOB. Worsened by exertion  - Hx of pacemaker due to bradycardia  - D dimer = negative  - Trop = negative x1  - EKG = sinus bradycardia  - Admit to telemetry  -f/u echo  - F/u pacemaker interrogation by the primary team  - Cardio Dr. Rosario consulted

## 2022-03-04 NOTE — PROGRESS NOTE ADULT - SUBJECTIVE AND OBJECTIVE BOX
Patient is a 56y old  Female who presents with a chief complaint of Hypoglycemia and chest pain (04 Mar 2022 14:47)    OVERNIGHT EVENTS: no acute events overnight, sitting up in bed, c/o left back pain       REVIEW OF SYSTEMS:  CONSTITUTIONAL: No fever, chills  NECK: No pain or stiffness  RESPIRATORY: No cough, SOB  CARDIOVASCULAR: No chest pain, palpitations  GASTROINTESTINAL: No abdominal pain. No nausea, vomiting, or diarrhea  GENITOURINARY: No dysuria  NEUROLOGICAL: No HA  MUSCULOSKELETAL: +ve left sided back pain       T(C): 36.4 (03-04-22 @ 12:25), Max: 36.6 (03-03-22 @ 15:35)  HR: 64 (03-04-22 @ 12:25) (54 - 64)  BP: 114/54 (03-04-22 @ 12:25) (106/60 - 120/66)  RR: 14 (03-04-22 @ 12:25) (14 - 18)  SpO2: 100% (03-04-22 @ 12:25) (98% - 100%)  Wt(kg): --Vital Signs Last 24 Hrs  T(C): 36.4 (04 Mar 2022 12:25), Max: 36.6 (03 Mar 2022 15:35)  T(F): 97.5 (04 Mar 2022 12:25), Max: 97.8 (03 Mar 2022 15:35)  HR: 64 (04 Mar 2022 12:25) (54 - 64)  BP: 114/54 (04 Mar 2022 12:25) (106/60 - 120/66)  BP(mean): 60 (04 Mar 2022 12:25) (60 - 60)  RR: 14 (04 Mar 2022 12:25) (14 - 18)  SpO2: 100% (04 Mar 2022 12:25) (98% - 100%)    MEDICATIONS  (STANDING):  cefTRIAXone   IVPB 1000 milliGRAM(s) IV Intermittent every 24 hours  cholecalciferol 1000 Unit(s) Oral daily  enoxaparin Injectable 40 milliGRAM(s) SubCutaneous every 24 hours    MEDICATIONS  (PRN):  acetaminophen     Tablet .. 650 milliGRAM(s) Oral every 6 hours PRN Temp greater or equal to 38C (100.4F), Mild Pain (1 - 3), Moderate Pain (4 - 6)  melatonin 3 milliGRAM(s) Oral at bedtime PRN Insomnia  ondansetron Injectable 4 milliGRAM(s) IV Push every 8 hours PRN Nausea and/or Vomiting      PHYSICAL EXAM:  GENERAL: frail, cachectic   EYES: clear conjunctiva  ENMT: Moist mucous membranes  NECK: Supple, No JVD  CHEST/LUNG: Clear to auscultation bilaterally; No rales, rhonchi, wheezing, or rubs, left chest PPM site with surgical glue MARKUS   HEART: S1, S2, Regular rate and rhythm  ABDOMEN: Soft, Nontender, Nondistended; Bowel sounds present  NEURO: Alert & Oriented X3  EXTREMITIES: No LE edema, no calf tenderness  : right nephrostomy tube with clear yellow urine   SKIN: No rashes or lesions    Consultant(s) Notes Reviewed:  [x ] YES  [ ] NO  Care Discussed with Consultants/Other Providers [ x] YES  [ ] NO    LABS:                        11.8   5.72  )-----------( 225      ( 04 Mar 2022 06:30 )             34.9     03-04    139  |  105  |  19<H>  ----------------------------<  85  4.0   |  28  |  1.05    Ca    10.0      04 Mar 2022 06:30  Phos  3.8     03-03  Mg     2.3     03-03    TPro  7.5  /  Alb  3.3<L>  /  TBili  0.3  /  DBili  x   /  AST  16  /  ALT  19  /  AlkPhos  98  03-03  CAPILLARY BLOOD GLUCOSE  POCT Blood Glucose.: 75 mg/dL (04 Mar 2022 12:15)  POCT Blood Glucose.: 81 mg/dL (04 Mar 2022 02:30)  POCT Blood Glucose.: 99 mg/dL (03 Mar 2022 21:48)  POCT Blood Glucose.: 112 mg/dL (03 Mar 2022 18:41)    RADIOLOGY & ADDITIONAL TESTS:    < from: Xray Chest 2 Views PA/Lat (03.02.22 @ 12:05) >    ACC: [id number] EXAM:  XR CHEST PA LAT 2V                          PROCEDURE DATE:  03/02/2022          INTERPRETATION:  PA and lateral chest on March 2, 2022 at 11:51 AM.   Patient has chest pain. There is history of bladder cancer.    Heart size iswithin normal limits. Left-sided pacemaker again noted.    No infiltrate.    Present film shows several bilateral nodular areas projecting over the   lung fields.    2 of these could represent nipples but not all 4. They can be seen on   February 23.The largest on the left measures approximately 2 cm. These   are new since July 19, 2021. Other than the lung nodules the chest is   unchanged.    IMPRESSION: Cannot rule out lung metastases. Consider CT chest. A note   was posted on the [facility] ED discrepancy site at 2:41 PM.      < end of copied text >      Imaging Personally Reviewed:  [ ] YES  [ ] NO

## 2022-03-04 NOTE — DIETITIAN INITIAL EVALUATION ADULT. - PROBLEM SELECTOR PLAN 6
- CXR = several bilateral nodular areas projecting over the lung fields  - Follows outpatient oncologist Dr. Kurtis Hoyt ([phone])

## 2022-03-04 NOTE — CHART NOTE - NSCHARTNOTEFT_GEN_A_CORE
Notified by patient that her Oncologist office called and was told that she should notify the treatment team of her "high Insulin level"   Blood work reviewed on pt portal on her phone   Insulin level 57.6ulU/ml collected on 2/25/2022    Hyperinsulinemia:   -outpt elevated reading, unknown baseline   -f/u repeat Insulin level, C peptide level, serum glucose and AM Cortisol level   -if pt becomes hypoglycemic again please resend insulin level, C peptide level, serum glucose and random cortisol level during the hypoglycemia episode as per Kiana Garcia recs   -mon FS     Above plan reviewed with Endo Dr. Garcia and Dr. Ocoha

## 2022-03-04 NOTE — PROGRESS NOTE ADULT - ASSESSMENT
_________________________________________________________________________________________  ========>>  M E D I C A L   A T T E N D I N G    F O L L O W  U P  N O T E  <<=========  -----------------------------------------------------------------------------------------------------    - Patient seen and examined by me earlier today.   - In summary,  JEF HOUSE is a 56y year old woman admitted with CP and hypoglycemia   - Patient today overall doing ok, comfortable, eating OK.       Pt with some pain in the left lower flank >> requested Ct scan per NP >> already ordered, pending..     ==================>> REVIEW OF SYSTEM <<=================    GEN: no fever, no chills, pain  as above   RESP: no SOB, no cough, no sputum  CVS: no chest pain, no palpitations, no edema  GI: no abdominal pain, no nausea, no constipation, no diarrhea, eating ok   : no dysuria, no frequency, no hematuria  Neuro: no headache, no dizziness  Derm : no itching, no rash    ==================>> PHYSICAL EXAM <<=================    GEN: A&O X 3 , NAD , comfortable, pleasant, calm , cachectic   HEENT: NCAT, PERRL, MMM, hearing intact  Neck: supple , no JVD appreciated  CVS: S1S2 , regular , No M/R/G appreciated  PULM: CTA B/L,  no W/R/R appreciated  ABD.: soft. non tender, non distended,  bowel sounds present  Extrem: intact pulses , no edema      nephrostomy i place and draining to leg bag   PSYCH : normal mood,  not anxious                             ( Note written / Date of service 03-04-22 )    ==================>> MEDICATIONS <<====================    cefTRIAXone   IVPB 1000 milliGRAM(s) IV Intermittent every 24 hours  cholecalciferol 1000 Unit(s) Oral daily  enoxaparin Injectable 40 milliGRAM(s) SubCutaneous every 24 hours    MEDICATIONS  (PRN):  acetaminophen     Tablet .. 650 milliGRAM(s) Oral every 6 hours PRN Temp greater or equal to 38C (100.4F), Mild Pain (1 - 3), Moderate Pain (4 - 6)  melatonin 3 milliGRAM(s) Oral at bedtime PRN Insomnia  ondansetron Injectable 4 milliGRAM(s) IV Push every 8 hours PRN Nausea and/or Vomiting    ___________  Active diet:  Diet, Regular:   No Beef  No Caffeine  No Dairy  No Pork  No Tomatoes  ___________________    ==================>> VITAL SIGNS <<==================    Height (cm): 162.6  Weight (kg): 41.3  BMI (kg/m2): 15.6  Vital Signs Last 24 HrsT(C): 36.4 (03-04-22 @ 12:25)  T(F): 97.5 (03-04-22 @ 12:25), Max: 97.8 (03-03-22 @ 15:35)  HR: 64 (03-04-22 @ 12:25) (54 - 64)  BP: 114/54 (03-04-22 @ 12:25)  RR: 14 (03-04-22 @ 12:25) (14 - 18)  SpO2: 100% (03-04-22 @ 12:25) (98% - 100%)      CAPILLARY BLOOD GLUCOSE  POCT Blood Glucose.: 75 mg/dL (04 Mar 2022 12:15)  POCT Blood Glucose.: 81 mg/dL (04 Mar 2022 02:30)  POCT Blood Glucose.: 99 mg/dL (03 Mar 2022 21:48)  POCT Blood Glucose.: 112 mg/dL (03 Mar 2022 18:41)     ==================>> LAB AND IMAGING <<==================                        11.8   5.72  )-----------( 225      ( 04 Mar 2022 06:30 )             34.9        03-04    139  |  105  |  19<H>  ----------------------------<  85  4.0   |  28  |  1.05    Ca    10.0      04 Mar 2022 06:30  Phos  3.8     03-03  Mg     2.3     03-03    TPro  7.5  /  Alb  3.3<L>  /  TBili  0.3  /  DBili  x   /  AST  16  /  ALT  19  /  AlkPhos  98  03-03    WBC count:   5.72 <<== ,  6.03 <<== ,  6.86 <<==   Hemoglobin:   11.8 <<==,  12.2 <<==,  11.7 <<==  platelets:  225 <==, 230 <==, 227 <==    Creatinine:  1.05  <<==, 1.23  <<==, 1.12  <<==  Sodium:   139  <==, 146  <==, 139  <==       AST:          16 <== , 18 <==      ALT:        19  <== , 19  <==      AP:        98  <=, 98  <=     Bili:        0.3  <=, 0.3  <=    _______________________  C U L T U R E S :    Culture - Urine (collected 23 Feb 2022 21:06)  Source: Clean Catch Clean Catch (Midstream)  Final Report (24 Feb 2022 17:30):    <10,000 CFU/mL Normal Urogenital Vandana    Culture - Blood (collected 23 Feb 2022 20:54)  Source: .Blood Blood  Final Report (28 Feb 2022 21:01):    No Growth Final    Culture - Blood (collected 23 Feb 2022 20:53)  Source: .Blood Blood  Final Report (28 Feb 2022 21:01):    No Growth Final        COVID-19 PCR: NotDetec (03-02-22 @ 15:54)  SARS-CoV-2: NotDetec (02-23-22 @ 18:37)    ___________________________________________________________________________________  ===============>>  A S S E S S M E N T   A N D   P L A N <<===============  ------------------------------------------------------------------------------------------    · Assessment	  Patient is a 56F, w/ PMHx of bradycardia (s/p pacemaker placement- [employer] Model Number - PM 2210, Serial number [id number], Implant 3/9/2010, most recent battery change 2/8/2022), asthma, myomectomy, bladder CA (on chemo Gemzar, Carboplatin, s/p nephrostomy tube on right kidney), Parathyroid tumor, HTN, and HLD, who came in with 5 day history of intermittent chest pain and low sugars. Admitted for hypoglycemia and chest pain.     Problem/Plan - 1:  ·  Problem: Hypoglycemia.   ·  Plan: Pt reports decreased carbohydrates intake secondary to concerns of cancer growth  Increased protein and fats intake education provided extensively by me   Nutritionist consulted  Monitor glucose.     Problem/Plan - 2:  ·  Problem: Chest pain.  unclear etiology   ·  Plan: Cardiac etiology ruled out by cardiology  Continue pain mgmt  Dr. Rosario, cardiology, following.     Problem/Plan - 3:  ·  Problem: UTI (urinary tract infection).   ·  Plan: UA +  Urine culture negative   Continue Cefpodoxime until 3/4.  >> pt states she is nearing  / is fue to nephrostomy change >> NP to d/w IR      Problem/Plan - 4:  ·  Problem: Bladder cancer.   Next chemo scheduled 3/8/22  nephrostomy tube needs to be changed  follow cat scan as above      Problem/Plan - 5:  ·  Problem: Pacemaker.   ·  Plan: Secondary to symptomatic bradycardia  Pt has [employer] Model Number - PM [id number], Serial number [id number], Implant 3/9/2010  Last battery change 2/8/2022. > healing well      Problem/Plan - 6:  ·  Problem: Multiple lung nodules.   ·  Plan: CXR noted above  Follow up outpatient w/Dr. Hoyt.     Problem/Plan - 7:  ·  Problem: Parathyroid tumor.   ·  Plan: Plan for surgical management in the future  Follows an outpatient endocrinologist.     Problem/Plan - 8:  ·  Problem: HLD (hyperlipidemia).   ·  Plan: Not on any meds at home  Continue supportive care.     Problem/Plan - 9:  ·  Problem: HTN (hypertension).   ·  Plan: Controlled  Not on any meds  Monitor BP.     Problem/Plan - 10:  ·  Problem: Prophylactic measure.   ·  Plan; DVT PPX: Lovenox.    dispo planing if sugars stay ok   --------------------------------------------  Case discussed with pt, NP  Education given on findings and plan of care  ___________________________  HLeslee Ochoa D.O.  Pager: [phone]

## 2022-03-04 NOTE — DIETITIAN INITIAL EVALUATION ADULT. - PERTINENT LABORATORY DATA
03-04 Na139 mmol/L Glu 85 mg/dL K+ 4.0 mmol/L Cr  1.05 mg/dL BUN 19 mg/dL<H> 03-03 Phos 3.8 mg/dL 03-03 Alb 3.3 g/dL<L>

## 2022-03-04 NOTE — DIETITIAN INITIAL EVALUATION ADULT. - ORAL INTAKE PTA/DIET HISTORY
reports adequate oral intake PTA but likely inadequate as intentionally stays away from carbohydrates causing hypoglycemia

## 2022-03-04 NOTE — DIETITIAN INITIAL EVALUATION ADULT. - PROBLEM SELECTOR PLAN 7
- H/o parathyroid tumor, plan for surgical management in the future  - Follows an outpatient endocrinologist

## 2022-03-05 NOTE — PROGRESS NOTE ADULT - SUBJECTIVE AND OBJECTIVE BOX
HPI:  Patient is a 56F, w/ PMHx of bradycardia (s/p pacemaker placement- [employer] Model Number - PM 2210, Serial number [id number], Implant 3/9/2010, most recent battery change 2/8/2022), asthma, myomectomy, bladder CA (on chemo Gemzar, Carboplatin, s/p nephrostomy tube on right kidney), Parathyroid tumor, HTN, and HLD, who came in with 5 day history of intermittent chest pain and low sugars. The chest pain comes intermittently and lasts few minutes per episode. It radiates to L chin, L chest under the breast, and R chest, and is exacerbated by exertion. Also, it is accompanied by SOB.  Patient admits to not eating much carbohydrates in her meals due to fear of cancer growth. She has many episodes of hypoglycemia at home, in which she gets lightheadedness, and it improves when she takes sugar.    Patient endorses soreness on her back and mild dysuria(chronically) due to her passing clots. She was here on 2/23/22 for UTI and was discharged with cefpodoxime. She had flank pain and worse dysuria at that time. (02 Mar 2022 15:45)      Patient is a 56y old  Female who presents with a chief complaint of Hypoglycemia and chest pain (04 Mar 2022 14:57)      INTERVAL HPI/OVERNIGHT EVENTS:  T(C): 36.2 (03-05-22 @ 05:28), Max: 36.4 (03-05-22 @ 04:50)  HR: 62 (03-05-22 @ 05:28) (60 - 63)  BP: 105/56 (03-05-22 @ 05:28) (105/56 - 118/56)  RR: 16 (03-05-22 @ 05:28) (16 - 18)  SpO2: 100% (03-05-22 @ 05:28) (98% - 100%)  Wt(kg): --  I&O's Summary      REVIEW OF SYSTEMS: denies fever, chills, SOB, palpitations, chest pain, abdominal pain, nausea, vomitting, diarrhea, constipation, dizziness    MEDICATIONS  (STANDING):  cholecalciferol 1000 Unit(s) Oral daily  enoxaparin Injectable 40 milliGRAM(s) SubCutaneous every 24 hours    MEDICATIONS  (PRN):  acetaminophen     Tablet .. 650 milliGRAM(s) Oral every 6 hours PRN Temp greater or equal to 38C (100.4F), Mild Pain (1 - 3), Moderate Pain (4 - 6)  melatonin 3 milliGRAM(s) Oral at bedtime PRN Insomnia  ondansetron Injectable 4 milliGRAM(s) IV Push every 8 hours PRN Nausea and/or Vomiting      PHYSICAL EXAM:  GENERAL: NAD, well-groomed, well-developed  HEAD:  Atraumatic, Normocephalic  EYES: EOMI, PERRLA, conjunctiva and sclera clear  ENMT: No tonsillar erythema, exudates, or enlargement; Moist mucous membranes, Good dentition, No lesions  NECK: Supple, No JVD, Normal thyroid  NERVOUS SYSTEM:  Alert & Oriented X3, Good concentration; Motor Strength 5/5 B/L upper and lower extremities; DTRs 2+ intact and symmetric  CHEST/LUNG: Clear to percussion bilaterally; No rales, rhonchi, wheezing, or rubs  HEART: Regular rate and rhythm; No murmurs, rubs, or gallops  ABDOMEN: Soft, Nontender, Nondistended; Bowel sounds present  EXTREMITIES:  2+ Peripheral Pulses, No clubbing, cyanosis, or edema  LYMPH: No lymphadenopathy noted  SKIN: No rashes or lesions  LABS:                        11.2   6.32  )-----------( 204      ( 05 Mar 2022 06:04 )             33.8     03-05    139  |  104  |  18  ----------------------------<  78  3.8   |  29  |  0.93    Ca    9.6      05 Mar 2022 06:04    TPro  6.8  /  Alb  3.1<L>  /  TBili  0.3  /  DBili  x   /  AST  14  /  ALT  16  /  AlkPhos  93  03-05        CAPILLARY BLOOD GLUCOSE      POCT Blood Glucose.: 86 mg/dL (05 Mar 2022 12:00)  POCT Blood Glucose.: 89 mg/dL (05 Mar 2022 07:41)  POCT Blood Glucose.: 73 mg/dL (05 Mar 2022 02:45)  POCT Blood Glucose.: 86 mg/dL (04 Mar 2022 21:50)  POCT Blood Glucose.: 104 mg/dL (04 Mar 2022 16:38)

## 2022-03-05 NOTE — PROGRESS NOTE ADULT - ASSESSMENT
seen and examiend mirta  covering for dr Ochoa    on bed  c/o lt sided back pain has it for awhile   no headach. denies cp or sob  labs noted    ct chest abd   lung nodule r/o mets  urinary bladder wall thickening  will get uro seen and examiend mirta  covering for dr Ochoa    on bed  c/o lt sided back pain has it for awhile   no headach. denies cp or sob  labs noted    ct chest abd   lung nodule r/o mets  urinary bladder wall thickening  will get uro   also has abd LNpathy    pt has HYPOglycemia . pt received email from [facility]  her insulin level is 57

## 2022-03-06 NOTE — PROGRESS NOTE ADULT - ASSESSMENT
_________________________________________________________________________________________  ========>>  M E D I C A L   A T T E N D I N G    F O L L O W  U P  N O T E  <<=========  -----------------------------------------------------------------------------------------------------    - Patient seen and examined by me earlier today.   - In summary,  JEF HOUSE is a 56y year old woman admitted with CP and hypoglycemia   - Patient today overall doing ok, comfortable, eating OK.       Pt with some pain in the left lower flank still, taking tylenol , declines any narcotics including tramadol    ==================>> REVIEW OF SYSTEM <<=================    GEN: no fever, no chills, pain  as above   RESP: no SOB, no cough, no sputum  CVS: no chest pain, no palpitations, no edema  GI: no abdominal pain, no nausea, no constipation, no diarrhea, eating ok   : no dysuria, no frequency, no hematuria : good urine flow from nephrostomy AND from bladder   Neuro: no headache, no dizziness  Derm : no itching, no rash    ==================>> PHYSICAL EXAM <<=================    GEN: A&O X 3 , NAD , comfortable, pleasant, calm , cachectic   HEENT: NCAT, PERRL, MMM, hearing intact  Neck: supple , no JVD appreciated  CVS: S1S2 , regular , No M/R/G appreciated  PULM: CTA B/L,  no W/R/R appreciated  ABD.: soft. non tender, non distended,  bowel sounds present  Extrem: intact pulses , no edema      nephrostomy i place and draining to leg bag   PSYCH : normal mood,  not anxious                             ( Note written / Date of service 03-06-22 )    ==================>> MEDICATIONS <<====================    cholecalciferol 1000 Unit(s) Oral daily  enoxaparin Injectable 40 milliGRAM(s) SubCutaneous every 24 hours  polyethylene glycol 3350 17 Gram(s) Oral daily  senna 2 Tablet(s) Oral at bedtime    MEDICATIONS  (PRN):  acetaminophen     Tablet .. 650 milliGRAM(s) Oral every 6 hours PRN Temp greater or equal to 38C (100.4F), Mild Pain (1 - 3), Moderate Pain (4 - 6)  melatonin 3 milliGRAM(s) Oral at bedtime PRN Insomnia  ondansetron Injectable 4 milliGRAM(s) IV Push every 8 hours PRN Nausea and/or Vomiting    ___________  Active diet:  Diet, Regular:   No Beef  No Caffeine  No Dairy  No Pork  No Tomatoes  ___________________    ==================>> VITAL SIGNS <<==================    Vital Signs Last 24 HrsT(C): 36.8 (03-06-22 @ 15:38)  T(F): 98.3 (03-06-22 @ 15:38), Max: 98.3 (03-06-22 @ 12:48)  HR: 73 (03-06-22 @ 15:38) (60 - 73)  BP: 123/78 (03-06-22 @ 15:38)  RR: 18 (03-06-22 @ 15:38) (16 - 18)  SpO2: 100% (03-06-22 @ 15:38) (100% - 100%)      CAPILLARY BLOOD GLUCOSE  POCT Blood Glucose.: 117 mg/dL (06 Mar 2022 19:12)  POCT Blood Glucose.: 109 mg/dL (06 Mar 2022 15:36)  POCT Blood Glucose.: 153 mg/dL (06 Mar 2022 10:01)  POCT Blood Glucose.: 82 mg/dL (06 Mar 2022 06:42)  POCT Blood Glucose.: 99 mg/dL (06 Mar 2022 01:08)  POCT Blood Glucose.: 88 mg/dL (05 Mar 2022 21:08)     ==================>> LAB AND IMAGING <<==================                        11.2   6.32  )-----------( 204      ( 05 Mar 2022 06:04 )             33.8        03-05    139  |  104  |  18  ----------------------------<  78  3.8   |  29  |  0.93    Ca    9.6      05 Mar 2022 06:04    TPro  6.8  /  Alb  3.1<L>  /  TBili  0.3  /  DBili  x   /  AST  14  /  ALT  16  /  AlkPhos  93  03-05    WBC count:   6.32 <<== ,  5.72 <<== ,  6.03 <<== ,  6.86 <<==   Hemoglobin:   11.2 <<==,  11.8 <<==,  12.2 <<==,  11.7 <<==  platelets:  204 <==, 225 <==, 230 <==, 227 <==    Creatinine:  0.93  <<==, 1.05  <<==, 1.23  <<==, 1.12  <<==  Sodium:   139  <==, 139  <==, 146  <==, 139  <==       AST:          14 <== , 16 <== , 18 <==      ALT:        16  <== , 19  <== , 19  <==      AP:        93  <=, 98  <=, 98  <=     Bili:        0.3  <=, 0.3  <=, 0.3  <=    Insulin Level, Serum (03.05.22 @ 19:08)    Insulin Level, Serum: 16.9 uU/mL    C-Peptide, Serum: 6.1 ng/mL (03.04.22 @ 22:56)    < from: CT Abdomen and Pelvis No Cont (03.04.22 @ 14:49) >  IMPRESSION:    Evaluation of the solid organs, vascular structures and GI tract is   limited without oral and IV contrast.    Bilateral lung nodules likely metastatic. 2 left lower lobe nodules are   cavitary. In light of cavitation, cannot exclude infection.    Right nephrostomy tube unchanged. No right hydronephrosis.    Reidentified near complete obliteration of the bladder lumen by irregular   soft tissue consistent with history of neoplasm.    Stable chronic severe left hydronephrosis with renal cortical atrophy.    Retroperitoneal adenopathy.    < end of copied text >    ___________________________________________________________________________________  ===============>>  A S S E S S M E N T   A N D   P L A N <<===============  ------------------------------------------------------------------------------------------    · Assessment	  Patient is a 56F, w/ PMHx of bradycardia (s/p pacemaker placement- [employer] Model Number - PM [id number], Serial number [id number], Implant 3/9/2010, most recent battery change 2/8/2022), asthma, myomectomy, bladder CA (on chemo Gemzar, Carboplatin, s/p nephrostomy tube on right kidney), Parathyroid tumor, HTN, and HLD, who came in with 5 day history of intermittent chest pain and low sugars. Admitted for hypoglycemia and chest pain.     Problem/Plan - 1:  ·  Problem: Hypoglycemia.   pt has been eating well here but FS have donnie low normal   Increased protein and fats intake education provided extensively by me   Monitor glucose.    outpatient Insulin level and confirmatory inpatient serum insulin level  / c-peptide are elevated       needs further workup for reason of increased insulin secretion / production          need endocrine consult ( ? if not available, may nee transfer to [facility])       Problem/Plan - 2:  ·  Problem: Chest pain.  unclear etiology   ·  Plan: Cardiac etiology ruled out by cardiology  Continue pain mgmt  Dr. Rosario, cardiology, following.    ** flank pain ; unclear etiology       ? related to metastatic disease ?          continue supportive care / pain mgmt      Problem/Plan - 3:  ·  Problem: UTI (urinary tract infection).   ·  Plan: UA +  Urine culture negative   finished Cefpodoxime until 3/4.  >> pt states she is nearing  / is fue to nephrostomy change >> NP to d/w IR in AM >> need to change nephrostomy before DC plan home      Problem/Plan - 4:  ·  Problem: Bladder cancer.   Next chemo scheduled 3/8/22  nephrostomy tube needs to be changed  follow cat scan as above   CT scan as above ( pt already aware of findings for the most part, previously )      Problem/Plan - 5:  ·  Problem: Pacemaker.   ·  Plan: Secondary to symptomatic bradycardia  Pt has [employer] Model Number - PM 2210, Serial number [id number], Implant 3/9/2010  Last battery change 2/8/2022. > healing well      Problem/Plan - 6:  ·  Problem: Multiple lung nodules.   Follow up outpatient w/Dr. Hoyt.     likely related to changes post chemo      Problem/Plan - 7:  ·  Problem: Parathyroid tumor.   ·  Plan: Plan for surgical management in the future  Follows an outpatient endocrinologist.     Problem/Plan - 8:  ·  Problem: HLD (hyperlipidemia).   ·  Plan: Not on any meds at home  Continue supportive care.     Problem/Plan - 9:  ·  Problem: HTN (hypertension).   ·  Plan: Controlled  Not on any meds  Monitor BP.     Problem/Plan - 10:  ·  Problem: Prophylactic measure.   ·  Plan; DVT PPX: Lovenox.    --------------------------------------------  Case discussed with pt, NP  Education given on findings and plan of care  ___________________________  H. NIEVES Ochoa.  Pager: [phone]

## 2022-03-07 NOTE — PROGRESS NOTE ADULT - ASSESSMENT
_________________________________________________________________________________________  ========>>  M E D I C A L   A T T E N D I N G    F O L L O W  U P  N O T E  <<=========  -----------------------------------------------------------------------------------------------------    - Patient seen and examined by me approximately thirty minutes ago.   - In summary,  JEF HOUSE is a 56y year old woman admitted with CP and hypoglycemia   - Patient today overall doing ok, comfortable, eating OK.       Pt with some pain in the left lower flank still, taking tylenol , declines any narcotics including tramadol    ==================>> REVIEW OF SYSTEM <<=================    GEN: no fever, no chills, pain  as above   RESP: no SOB, no cough, no sputum  CVS: no chest pain, no palpitations, no edema  GI: no abdominal pain, no nausea, no constipation, no diarrhea, eating ok   : no dysuria, no frequency, no hematuria : good urine flow from nephrostomy AND from bladder   Neuro: no headache, no dizziness  Derm : no itching, no rash    ==================>> PHYSICAL EXAM <<=================    GEN: A&O X 3 , NAD , comfortable, pleasant, calm , cachectic   HEENT: NCAT, PERRL, MMM, hearing intact  Neck: supple , no JVD appreciated  CVS: S1S2 , regular , No M/R/G appreciated  PULM: CTA B/L,  no W/R/R appreciated  ABD.: soft. non tender, non distended,  bowel sounds present  Extrem: intact pulses , no edema      nephrostomy in place and draining to leg bag   PSYCH : normal mood,  not anxious                               ( note written / Date of service   03-07-22 )    ==================>> MEDICATIONS <<====================    cholecalciferol 1000 Unit(s) Oral daily  enoxaparin Injectable 40 milliGRAM(s) SubCutaneous every 24 hours  polyethylene glycol 3350 17 Gram(s) Oral daily  senna 2 Tablet(s) Oral at bedtime    MEDICATIONS  (PRN):  acetaminophen     Tablet .. 650 milliGRAM(s) Oral every 6 hours PRN Temp greater or equal to 38C (100.4F), Mild Pain (1 - 3), Moderate Pain (4 - 6)  melatonin 3 milliGRAM(s) Oral at bedtime PRN Insomnia  ondansetron Injectable 4 milliGRAM(s) IV Push every 8 hours PRN Nausea and/or Vomiting    ___________  Active diet:  Diet, Regular:   No Beef  No Caffeine  No Dairy  No Pork  No Tomatoes  ___________________    ==================>> VITAL SIGNS <<==================     Vital Signs Last 24 HrsT(C): 36.9 (03-07-22 @ 05:15)  T(F): 98.4 (03-07-22 @ 05:15), Max: 98.4 (03-07-22 @ 05:15)  HR: 65 (03-07-22 @ 05:15) (65 - 73)  BP: 128/74 (03-07-22 @ 05:15)  RR: 16 (03-07-22 @ 05:15) (16 - 18)  SpO2: 100% (03-07-22 @ 05:15) (98% - 100%)      CAPILLARY BLOOD GLUCOSE  POCT Blood Glucose.: 292 mg/dL (07 Mar 2022 09:44)  POCT Blood Glucose.: 94 mg/dL (07 Mar 2022 04:44)  POCT Blood Glucose.: 95 mg/dL (06 Mar 2022 23:52)  POCT Blood Glucose.: 117 mg/dL (06 Mar 2022 19:12)  POCT Blood Glucose.: 109 mg/dL (06 Mar 2022 15:36)     ==================>> LAB AND IMAGING <<==================       03-07    136  |  102  |  16  ----------------------------<  88  3.8   |  29  |  1.06    Ca    10.7<H>      07 Mar 2022 06:36  Mg     2.4     03-07    Insulin Level, Serum (03.05.22 @ 19:08)    Insulin Level, Serum: 16.9 uU/mL    C-Peptide, Serum: 6.1 ng/mL (03.04.22 @ 22:56)    pending cortisol level !     < from: CT Abdomen and Pelvis No Cont (03.04.22 @ 14:49) >  IMPRESSION:  Evaluation of the solid organs, vascular structures and GI tract is   limited without oral and IV contrast.  Bilateral lung nodules likely metastatic. 2 left lower lobe nodules are   cavitary. In light of cavitation, cannot exclude infection.  Right nephrostomy tube unchanged. No right hydronephrosis.  Reidentified near complete obliteration of the bladder lumen by irregular   soft tissue consistent with history of neoplasm.  Stable chronic severe left hydronephrosis with renal cortical atrophy.  Retroperitoneal adenopathy.  < end of copied text >    ___________________________________________________________________________________  ===============>>  A S S E S S M E N T   A N D   P L A N <<===============  ------------------------------------------------------------------------------------------    · Assessment	  Patient is a 56F, w/ PMHx of bradycardia (s/p pacemaker placement- [employer] Model Number - PM [id number], Serial number [id number], Implant 3/9/2010, most recent battery change 2/8/2022), asthma, myomectomy, bladder CA (on chemo Gemzar, Carboplatin, s/p nephrostomy tube on right kidney), Parathyroid tumor, HTN, and HLD, who came in with 5 day history of intermittent chest pain and low sugars. Admitted for hypoglycemia and chest pain.     Problem/Plan - 1:  ·  Problem: Hypoglycemia.   pt has been eating well here but FS have donnie low normal   Increased protein and fats intake education provided extensively by me   Monitor glucose.    outpatient Insulin level and confirmatory inpatient serum insulin level  / c-peptide are elevated       needs further workup for reason of increased insulin secretion / production          need endocrine consult ( not available til tomorrow) discussed transfer with pt, to think about it      Problem/Plan - 2:  ·  Problem: Chest pain.  unclear etiology   ·  Plan: Cardiac etiology ruled out by cardiology  Continue pain mgmt  Dr. Rosario, cardiology, following.    ** flank pain ; unclear etiology       ? related to metastatic disease ?          continue supportive care / pain mgmt      Problem/Plan - 3:  ·  Problem: UTI (urinary tract infection).   ·  Plan: UA +  Urine culture negative   finished Cefpodoxime until 3/4.  >> pt states she is nearing  / is due to nephrostomy change >> NP to d/w IR in AM >> need to change nephrostomy before DC plan home         >> arranged for wednesday with anesthesia     Problem/Plan - 4:  ·  Problem: Bladder cancer.   Next chemo was scheduled for 3/8/22  nephrostomy tube needs to be changed  follow cat scan as above   CT scan as above ( pt already aware of findings for the most part, previously )   to follow up as OP     Problem/Plan - 5:  ·  Problem: Pacemaker.   ·  Plan: Secondary to symptomatic bradycardia  Pt has [employer] Model Number - PM [id number], Serial number [id number], Implant 3/9/2010  Last battery change 2/8/2022. > healing well      Problem/Plan - 6:  ·  Problem: Multiple lung nodules.   Follow up outpatient w/Dr. Hoyt.     likely related to changes post chemo      Problem/Plan - 7:  ·  Problem: Parathyroid tumor.   ·  Plan: Plan for surgical management in the future  Follows an outpatient endocrinologist.     Problem/Plan - 8:  ·  Problem: HLD (hyperlipidemia).   ·  Plan: Not on any meds at home  Continue supportive care.     Problem/Plan - 9:  ·  Problem: HTN (hypertension).   ·  Plan: Controlled  Not on any meds  Monitor BP.     Problem/Plan - 10:  ·  Problem: Prophylactic measure.   ·  Plan; DVT PPX: Lovenox.    --------------------------------------------  Case discussed with pt, NP...   Education given on findings and plan of care  ___________________________  H. NIEVES Ochoa.  Pager: [phone]

## 2022-03-07 NOTE — PROGRESS NOTE ADULT - SUBJECTIVE AND OBJECTIVE BOX
Patient is a 56y old  Female who presents with a chief complaint of Hypoglycemia and chest pain (07 Mar 2022 13:22)    OVERNIGHT EVENTS: no acute events overnight, offering no new complaints       REVIEW OF SYSTEMS:  CONSTITUTIONAL: No fever, chills  ENMT:  No difficulty hearing, no change in vision  NECK: No pain or stiffness  RESPIRATORY: No cough, SOB  CARDIOVASCULAR: No chest pain, palpitations  GASTROINTESTINAL: No abdominal pain. No nausea, vomiting, or diarrhea  GENITOURINARY:  right nephrostomy tube   NEUROLOGICAL: No HA  MUSCULOSKELETAL: left back pain       T(C): 36.2 (03-07-22 @ 13:40), Max: 36.9 (03-07-22 @ 05:15)  HR: 77 (03-07-22 @ 13:40) (65 - 77)  BP: 103/65 (03-07-22 @ 13:40) (103/65 - 141/58)  RR: 17 (03-07-22 @ 13:40) (16 - 17)  SpO2: 100% (03-07-22 @ 13:40) (98% - 100%)  Wt(kg): --Vital Signs Last 24 Hrs  T(C): 36.2 (07 Mar 2022 13:40), Max: 36.9 (07 Mar 2022 05:15)  T(F): 97.2 (07 Mar 2022 13:40), Max: 98.4 (07 Mar 2022 05:15)  HR: 77 (07 Mar 2022 13:40) (65 - 77)  BP: 103/65 (07 Mar 2022 13:40) (103/65 - 141/58)  BP(mean): --  RR: 17 (07 Mar 2022 13:40) (16 - 17)  SpO2: 100% (07 Mar 2022 13:40) (98% - 100%)    MEDICATIONS  (STANDING):  cholecalciferol 1000 Unit(s) Oral daily  enoxaparin Injectable 40 milliGRAM(s) SubCutaneous every 24 hours  polyethylene glycol 3350 17 Gram(s) Oral daily  senna 2 Tablet(s) Oral at bedtime    MEDICATIONS  (PRN):  acetaminophen     Tablet .. 650 milliGRAM(s) Oral every 6 hours PRN Temp greater or equal to 38C (100.4F), Mild Pain (1 - 3), Moderate Pain (4 - 6)  melatonin 3 milliGRAM(s) Oral at bedtime PRN Insomnia  ondansetron Injectable 4 milliGRAM(s) IV Push every 8 hours PRN Nausea and/or Vomiting      PHYSICAL EXAM:  GENERAL: frail cachectic   EYES: clear conjunctiva  ENMT: Moist mucous membranes  NECK: Supple, No JVD  CHEST/LUNG: Clear to auscultation bilaterally; No rales, rhonchi, wheezing, or rubs, left chest PPM site surgical glue MARKUS   HEART: S1, S2, Regular rate and rhythm  ABDOMEN: Soft, Nontender, Nondistended; Bowel sounds present  NEURO: Alert & Oriented X3  EXTREMITIES: No LE edema, no calf tenderness  SKIN: right nephrostomy tube     Consultant(s) Notes Reviewed:  [x ] YES  [ ] NO  Care Discussed with Consultants/Other Providers [ x] YES  [ ] NO    LABS:    03-07    136  |  102  |  16  ----------------------------<  88  3.8   |  29  |  1.06    Ca    10.7<H>      07 Mar 2022 06:36  Mg     2.4     03-07    CAPILLARY BLOOD GLUCOSE  POCT Blood Glucose.: 105 mg/dL (07 Mar 2022 13:55)  POCT Blood Glucose.: 292 mg/dL (07 Mar 2022 09:44)  POCT Blood Glucose.: 94 mg/dL (07 Mar 2022 04:44)  POCT Blood Glucose.: 95 mg/dL (06 Mar 2022 23:52)  POCT Blood Glucose.: 117 mg/dL (06 Mar 2022 19:12)    RADIOLOGY & ADDITIONAL TESTS:    < from: CT Abdomen and Pelvis No Cont (03.04.22 @ 14:49) >    ACC: [id number] EXAM:  CT CHEST                        ACC: [id number] EXAM:  CT ABDOMEN AND PELVIS                          PROCEDURE DATE:  03/04/2022          INTERPRETATION:  CLINICAL INFORMATION: 56 years  Female with abd pain.    COMPARISON: CT abdomen and pelvis 7/19/2021 and outside PET/CT of   7/16/2021    CONTRAST/COMPLICATIONS:  IV Contrast: IV contrast documented in associated exam (accession   [id number]), NONE (accession [id number])  Oral Contrast: NONE  Complications: None reported at time of study completion    PROCEDURE:  CT of the Chest, Abdomen and Pelvis was performed.  Sagittal and coronal reformats were performed.  Evaluation of the solid organs, vascular structures and GI tract is   limited without oral and IV contrast.  LIMITATIONS:    FINDINGS:  CHEST:  LUNGS AND LARGE AIRWAYS: Patent central airways. Bilateral lung nodules   suspicious for metastatic disease. 9.0 cm pleural-based right lower lobe   nodule (2:70). Multiple bilateral lower lobe nodules including a dominant   1.4 cm right lower lobe nodule (2:93 and a 2.0 cm cavitary left lower   lobe nodule (2:95). A posterior 1.4 cm left lower lobe nodule (2:103) is   also cavitary.  PLEURA: No pleural effusion.  VESSELS: Within normal limits.  HEART: Heart size is normal. Pacemaker leads in the right atrium and   right ventricle. No pericardial effusion.  MEDIASTINUM AND FLORENTIN: No lymphadenopathy.  CHEST WALL AND LOWER NECK: Pacemaker pulse generator in the left chest   wall.    ABDOMEN AND PELVIS:  LIVER: Within normal limits.  BILE DUCTS: Normal caliber.  GALLBLADDER: Within normal limits.  SPLEEN: Within normal limits.  PANCREAS: Within normal limits.  ADRENALS: Within normal limits.  KIDNEYS/URETERS: Right nephrostomy tube without significant change. No   right hydronephrosis.  Stable chronic severe left hydronephrosis with read demonstrated cortical   thinning. Moderate left hydroureter unchanged.    BLADDER: Soft tissue nearly weekly replacing the bladder lumen.  REPRODUCTIVE ORGANS: Prominent uterus unchanged.    BOWEL: No bowel obstruction. Appendix is not visualized and cannot be   assessed.. Fluid-filled small bowel loops without abnormal distention.   Mild colonic fecal burden.  PERITONEUM: No ascites.  VESSELS: Within normal limits.  RETROPERITONEUM/LYMPH NODES: Enlarged 2.1 x 1.5 cm left periaortic lymph   node (2:148). Other smaller left periaortic lymph nodes present as well.  ABDOMINAL WALL: Within normal limits.  BONES: Within normal limits.    IMPRESSION:    Evaluation of the solid organs, vascular structures and GI tract is   limited without oral and IV contrast.    Bilateral lung nodules likely metastatic. 2 left lower lobe nodules are   cavitary. In light of cavitation, cannot exclude infection.    Right nephrostomy tube unchanged. No right hydronephrosis.    Reidentified near complete obliteration of the bladder lumen by irregular   soft tissue consistent with history of neoplasm.    Stable chronic severe left hydronephrosis with renal cortical atrophy.    Retroperitoneal adenopathy.      < end of copied text >      Imaging Personally Reviewed:  [ ] YES  [ ] NO

## 2022-03-07 NOTE — PROGRESS NOTE ADULT - PROBLEM SELECTOR PLAN 10
-discharge disposition back home pending Endo consult (available 3/8, primary team to call in AM), and IR guided nephrostomy tube exchange on Wednesday

## 2022-03-07 NOTE — PROGRESS NOTE ADULT - ASSESSMENT
56F, w/ PMHx of bradycardia (s/p pacemaker placement- [employer] Model Number - PM [id number], Serial number [id number], Implant 3/9/2010, most recent battery change 2/8/2022), asthma, myomectomy, bladder CA (on chemo Gemzar, Carboplatin, s/p nephrostomy tube on right kidney), Parathyroid tumor, HTN, and HLD, who came in with 5 day history of intermittent chest pain and low sugars. Admitted for hypoglycemia and chest pain.  Initially monitored on tele then transferred to medicine   Chest Xray with suspicion for lung metastasis. CT chest abd and pelvis with suspicion of lung metastasis   Hospital course complicated with finding of Hyperinsulinemia, Endo consult in progress.   Pt pending IR guided nephrostomy tube exchange

## 2022-03-08 NOTE — CONSULT NOTE ADULT - SUBJECTIVE AND OBJECTIVE BOX
CHIEF COMPLAINT:Patient is a 56y old  Female who presents with a chief complaint of Hypoglycemia and chest pain (02 Mar 2022 15:45)      HPI:  Patient is a 56F, w/ PMHx of bradycardia (s/p pacemaker placement- [employer] Model Number - PM 2210, Serial number [id number], Implant 3/9/2010, most recent battery change 2/8/2022), asthma, myomectomy, bladder CA (on chemo Gemzar, Carboplatin, s/p nephrostomy tube on right kidney), Parathyroid tumor, HTN, and HLD, who came in with 5 day history of intermittent chest pain and low sugars. The chest pain comes intermittently and lasts few minutes per episode. It radiates to L chin, L chest under the breast, and R chest, and is exacerbated by exertion. Also, it is accompanied by SOB.  Patient admits to not eating much carbohydrates in her meals due to fear of cancer growth. She has many episodes of hypoglycemia at home, in which she gets lightheadedness, and it improves when she takes sugar.    Patient endorses soreness on her back and mild dysuria(chronically) due to her passing clots. She was here on 2/23/22 for UTI and was discharged with cefpodoxime. She had flank pain and worse dysuria at that time. (02 Mar 2022 15:45)      PAST MEDICAL & SURGICAL HISTORY:  Anemia    Asthma    HLD (hyperlipidemia)    Pacemaker    Bladder cancer    HTN (hypertension)    Parathyroid tumor    Liver cyst    Hydronephrosis  has right Nephrostomy tube - dressing intact    Bradycardia    H/O myomectomy    Presence of cardiac pacemaker    H/O hydronephrosis  s/p Right Perc nephrostomy tube placement 02/2021, exchange 06/2021        MEDICATIONS  (STANDING):  cefpodoxime 100 milliGRAM(s) Oral every 12 hours  cholecalciferol 1000 Unit(s) Oral daily  enoxaparin Injectable 40 milliGRAM(s) SubCutaneous every 24 hours    MEDICATIONS  (PRN):  acetaminophen     Tablet .. 650 milliGRAM(s) Oral every 6 hours PRN Temp greater or equal to 38C (100.4F), Mild Pain (1 - 3), Moderate Pain (4 - 6)  melatonin 3 milliGRAM(s) Oral at bedtime PRN Insomnia  ondansetron Injectable 4 milliGRAM(s) IV Push every 8 hours PRN Nausea and/or Vomiting      FAMILY HISTORY:  Family history of prostate cancer (Father)    Family history of CHF (congestive heart failure) (Father)    Family history of atrial fibrillation (Father)        SOCIAL HISTORY:    [x ] Non-smoker  [ ] Smoker  [ ] Alcohol    Allergies    No Known Allergies    Intolerances    	    REVIEW OF SYSTEMS:  CONSTITUTIONAL: No fever, weight loss, or fatigue  EYES: No eye pain, visual disturbances, or discharge  ENT:  No difficulty hearing, tinnitus, vertigo; No sinus or throat pain  NECK: No pain or stiffness  RESPIRATORY: No cough, wheezing, chills or hemoptysis; No Shortness of Breath  CARDIOVASCULAR: No chest pain, palpitations, passing out, dizziness, or leg swelling  GASTROINTESTINAL: No abdominal or epigastric pain. No nausea, vomiting, or hematemesis; No diarrhea or constipation. No melena or hematochezia.  GENITOURINARY: No dysuria, frequency, hematuria, or incontinence  NEUROLOGICAL: No headaches, memory loss, loss of strength, numbness, or tremors  SKIN: No itching, burning, rashes, or lesions   LYMPH Nodes: No enlarged glands  ENDOCRINE: No heat or cold intolerance; No hair loss  MUSCULOSKELETAL: No joint pain or swelling; No muscle, back, or extremity pain  PSYCHIATRIC: No depression, anxiety, mood swings, or difficulty sleeping  HEME/LYMPH: No easy bruising, or bleeding gums  ALLERGY AND IMMUNOLOGIC: No hives or eczema	    [ ] All others negative	  [ ] Unable to obtain    PHYSICAL EXAM:  T(C): 36.7 (03-02-22 @ 16:01), Max: 36.8 (03-02-22 @ 11:07)  HR: 63 (03-02-22 @ 16:01) (62 - 68)  BP: 151/81 (03-02-22 @ 16:01) (136/65 - 154/90)  RR: 18 (03-02-22 @ 16:01) (18 - 20)  SpO2: 97% (03-02-22 @ 16:01) (97% - 100%)  Wt(kg): --  I&O's Summary      Appearance: Normal	  HEENT:   Normal oral mucosa, PERRL, EOMI	  Lymphatic: No lymphadenopathy  Cardiovascular: Normal S1 S2, No JVD, + murmurs, No edema/ tenderness ACW  Respiratory: Lungs clear to auscultation	  Psychiatry: A & O x 3, Mood & affect appropriate  Gastrointestinal:  Soft, Non-tender, + BS	  Skin: No rashes, No ecchymoses, No cyanosis	  Neurologic: Non-focal  Extremities: Normal range of motion, No clubbing, cyanosis or edema  Vascular: Peripheral pulses palpable 2+ bilaterally    TELEMETRY: 	    ECG:  	  RADIOLOGY:  OTHER: 	  	  LABS:	 	    CARDIAC MARKERS:                              11.7   6.86  )-----------( 227      ( 02 Mar 2022 11:39 )             36.4     03-02    139  |  107  |  18  ----------------------------<  79  4.2   |  26  |  1.12    Ca    9.9      02 Mar 2022 11:39    TPro  7.7  /  Alb  3.2<L>  /  TBili  0.3  /  DBili  x   /  AST  18  /  ALT  19  /  AlkPhos  98  03-02    proBNP:   Lipid Profile:   HgA1c:   TSH:       PREVIOUS DIAGNOSTIC TESTING:    < from: 12 Lead ECG (02.23.22 @ 16:37) >  Diagnosis Line Sinus bradycardia  Otherwise normal ECG    D-Dimer Assay, Quantitative (03.02.22 @ 11:39)    D-Dimer Assay, Quantitative: <150: D-Dimer result less than 230 ng/mL DDU correlates with the absence of  thrombosis in a patient with a low and moderate       pre-test probability of thrombosis.  1 DDU is approximately equal to  2 ng/mL FEU (previous units). ng/mL DDU            
Time of visit:    CHIEF COMPLAINT: Patient is a 56y old  Female who presents with a chief complaint of Hypoglycemia and chest pain (05 Mar 2022 12:42)      HPI:  Patient is a 56F, w/ PMHx of bradycardia (s/p pacemaker placement- [employer] Model Number - PM 2210, Serial number [id number], Implant 3/9/2010, most recent battery change 2/8/2022), asthma, myomectomy, bladder CA (on chemo Gemzar, Carboplatin, s/p nephrostomy tube on right kidney), Parathyroid tumor, HTN, and HLD, who came in with 5 day history of intermittent chest pain and low sugars. The chest pain comes intermittently and lasts few minutes per episode. It radiates to L chin, L chest under the breast, and R chest, and is exacerbated by exertion. Also, it is accompanied by SOB.  Patient admits to not eating much carbohydrates in her meals due to fear of cancer growth. She has many episodes of hypoglycemia at home, in which she gets lightheadedness, and it improves when she takes sugar.    Patient endorses soreness on her back and mild dysuria(chronically) due to her passing clots. She was here on 2/23/22 for UTI and was discharged with cefpodoxime. She had flank pain and worse dysuria at that time. (02 Mar 2022 15:45)   Patient seen and examined.     PAST MEDICAL & SURGICAL HISTORY:  Anemia    Asthma    HLD (hyperlipidemia)    Pacemaker    Bladder cancer    HTN (hypertension)    Parathyroid tumor    Liver cyst    Hydronephrosis  has right Nephrostomy tube - dressing intact    Bradycardia    H/O myomectomy    Presence of cardiac pacemaker    H/O hydronephrosis  s/p Right Perc nephrostomy tube placement 02/2021, exchange 06/2021        Allergies    No Known Allergies    Intolerances        MEDICATIONS  (STANDING):  cholecalciferol 1000 Unit(s) Oral daily  enoxaparin Injectable 40 milliGRAM(s) SubCutaneous every 24 hours      MEDICATIONS  (PRN):  acetaminophen     Tablet .. 650 milliGRAM(s) Oral every 6 hours PRN Temp greater or equal to 38C (100.4F), Mild Pain (1 - 3), Moderate Pain (4 - 6)  melatonin 3 milliGRAM(s) Oral at bedtime PRN Insomnia  ondansetron Injectable 4 milliGRAM(s) IV Push every 8 hours PRN Nausea and/or Vomiting   Medications up to date at time of exam.    Medications up to date at time of exam.    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    CARDIOVASCULAR:  denies pressure, squeezing, tightness, or heaviness about the chest; no palpitations.    RESPIRATORY:  denies SOB, cough, DUQUE, wheezing.    GASTROINTESTINAL:  denies abdominal pain, nausea, vomiting or diarrhea.    GENITOURINARY: denies dysuria, frequency or urgency.    NEUROLOGIC:  denies numbness, tingling, seizures or weakness.    PSYCHIATRIC:  denies disorder of thought or mood.    MSK: denies swelling, redness      PHYSICAL EXAMINATION:    GENERAL: The patient is a well-developed, well-nourished, in no apparent distress.     Vital Signs Last 24 Hrs  T(C): 37.1 (05 Mar 2022 14:00), Max: 37.1 (05 Mar 2022 14:00)  T(F): 98.8 (05 Mar 2022 14:00), Max: 98.8 (05 Mar 2022 14:00)  HR: 66 (05 Mar 2022 14:00) (60 - 66)  BP: 108/73 (05 Mar 2022 14:00) (105/56 - 118/56)  BP(mean): --  RR: 20 (05 Mar 2022 14:00) (16 - 20)  SpO2: 99% (05 Mar 2022 14:00) (98% - 100%)   (if applicable)    Chest Tube (if applicable)    HEENT: Head is normocephalic and atraumatic. Extraocular muscles are intact. Mucous membranes are moist.     NECK: Supple, no palpable adenopathy.    LUNGS: Clear to auscultation, no wheezing, rales, or rhonchi.    HEART: Regular rate and rhythm without murmur.    ABDOMEN: Soft, nontender, and nondistended.  No hepatosplenomegaly is noted. + nephrostomy tube     EXTREMITIES: Without any cyanosis, clubbing, rash, lesions or edema.    NEUROLOGIC: Awake, alert, oriented, grossly intact    SKIN: Warm, dry, good turgor.      LABS:                        11.2   6.32  )-----------( 204      ( 05 Mar 2022 06:04 )             33.8     03-05    139  |  104  |  18  ----------------------------<  78  3.8   |  29  |  0.93    Ca    9.6      05 Mar 2022 06:04    TPro  6.8  /  Alb  3.1<L>  /  TBili  0.3  /  DBili  x   /  AST  14  /  ALT  16  /  AlkPhos  93  03-05                        MICROBIOLOGY: (if applicable)    RADIOLOGY & ADDITIONAL STUDIES:  EKG:   CT chest;< from: CT Chest No Cont (03.04.22 @ 14:48) >    ACC: [id number] EXAM:  CT CHEST                        ACC: [id number] EXAM:  CT ABDOMEN AND PELVIS                          PROCEDURE DATE:  03/04/2022          INTERPRETATION:  CLINICAL INFORMATION: 56 years  Female with abd pain.    COMPARISON: CT abdomen and pelvis 7/19/2021 and outside PET/CT of   7/16/2021    CONTRAST/COMPLICATIONS:  IV Contrast: IV contrast documented in associated exam (accession   [id number]), NONE (accession [id number])  Oral Contrast: NONE  Complications: None reported at time of study completion    PROCEDURE:  CT of the Chest, Abdomen and Pelvis was performed.  Sagittal and coronal reformats were performed.  Evaluation of the solid organs, vascular structures and GI tract is   limited without oral and IV contrast.  LIMITATIONS:    FINDINGS:  CHEST:  LUNGS AND LARGE AIRWAYS: Patent central airways. Bilateral lung nodules   suspicious for metastatic disease. 9.0 cm pleural-based right lower lobe   nodule (2:70). Multiple bilateral lower lobe nodules including a dominant   1.4 cm right lower lobe nodule (2:93 and a 2.0 cm cavitary left lower   lobe nodule (2:95). A posterior 1.4 cm left lower lobe nodule (2:103) is   also cavitary.  PLEURA: No pleural effusion.  VESSELS: Within normal limits.  HEART: Heart size is normal. Pacemaker leads in the right atrium and   right ventricle. No pericardial effusion.  MEDIASTINUM AND FLORENTIN: No lymphadenopathy.  CHEST WALL AND LOWER NECK: Pacemaker pulse generator in the left chest   wall.    ABDOMEN AND PELVIS:  LIVER: Within normal limits.  BILE DUCTS: Normal caliber.  GALLBLADDER: Within normal limits.  SPLEEN: Within normal limits.  PANCREAS: Within normal limits.  ADRENALS: Within normal limits.  KIDNEYS/URETERS: Right nephrostomy tube without significant change. No   right hydronephrosis.  Stable chronic severe left hydronephrosis with read demonstrated cortical   thinning. Moderate left hydroureter unchanged.    BLADDER: Soft tissue nearly weekly replacing the bladder lumen.  REPRODUCTIVE ORGANS: Prominent uterus unchanged.    BOWEL: No bowel obstruction. Appendix is not visualized and cannot be   assessed.. Fluid-filled small bowel loops without abnormal distention.   Mild colonic fecal burden.  PERITONEUM: No ascites.  VESSELS: Within normal limits.  RETROPERITONEUM/LYMPH NODES: Enlarged 2.1 x 1.5 cm left periaortic lymph   node (2:148). Other smaller left periaortic lymph nodes present as well.  ABDOMINAL WALL: Within normal limits.  BONES: Within normal limits.    IMPRESSION:    Evaluation of the solid organs, vascular structures and GI tract is   limited without oral and IV contrast.    Bilateral lung nodules likely metastatic. 2 left lower lobe nodules are   cavitary. In light of cavitation, cannot exclude infection.    Right nephrostomy tube unchanged. No right hydronephrosis.    Reidentified near complete obliteration of the bladder lumen by irregular   soft tissue consistent with history of neoplasm.    Stable chronic severe left hydronephrosis with renal cortical atrophy.    Retroperitoneal adenopathy.        --- End of Report ---            WATSON RAO MD; Attending Radiologist  This document has been electronically signed. Mar  4 2022  3:11PM    < end of copied text >    ECHO:    IMPRESSION: 56y Female PAST MEDICAL & SURGICAL HISTORY:  Anemia    Asthma    HLD (hyperlipidemia)    Pacemaker    Bladder cancer    HTN (hypertension)    Parathyroid tumor    Liver cyst    Hydronephrosis  has right Nephrostomy tube - dressing intact    Bradycardia    H/O myomectomy    Presence of cardiac pacemaker    H/O hydronephrosis  s/p Right Perc nephrostomy tube placement 02/2021, exchange 06/2021     p/w         IMP: This is a  56 yr old woman  bradycardia (s/p pacemaker placement- [employer] Model Number - PM 2210, Serial number [id number], Implant 3/9/2010, most recent battery change 2/8/2022), asthma, myomectomy, bladder CA (on chemo Gemzar, Carboplatin, s/p nephrostomy tube on right kidney), Parathyroid tumor, HTN, and HLD, who came in with 5 day history of intermittent chest pain and hypoglycemia. Pat noted with multiple b/l lung nodules on CT chest most like due to metastatic disease.  Pat is aware of lesions and being monitored by Dr Hoyt  ( oncology) .  Currently no SOB or cough       Sugg:  - Monitor blood sugar   - Albuterol inhaler  2 puff q6h prn   - Pat do not requiring supp O2 at this time   - Pain control , refused morphine and percocet   - Nephrostomy tube care   - F/U as out pat with Dr Hoyt ( onco)   - DVT GI prophy   
Patient is a 56y old  Female who presents with a chief complaint of Hypoglycemia and chest pain (07 Mar 2022 16:39)      HPI:  Patient is a 56F, w/ PMHx of bradycardia (s/p pacemaker placement- [employer] Model Number - PM 2210, Serial number [id number], Implant 3/9/2010, most recent battery change 2/8/2022), asthma, myomectomy, bladder CA (on chemo Gemzar, Carboplatin, s/p nephrostomy tube on right kidney), Parathyroid tumor, HTN, and HLD, who came in with 5 day history of intermittent chest pain and low sugars. The chest pain comes intermittently and lasts few minutes per episode. It radiates to L chin, L chest under the breast, and R chest, and is exacerbated by exertion. Also, it is accompanied by SOB.  Patient admits to not eating much carbohydrates in her meals due to fear of cancer growth. She has many episodes of hypoglycemia at home, in which she gets lightheadedness, and it improves when she takes sugar.    Patient endorses soreness on her back and mild dysuria(chronically) due to her passing clots. She was here on 2/23/22 for UTI and was discharged with cefpodoxime. She had flank pain and worse dysuria at that time.   Found to have hypoglycemia. Pt admits to having hypoglycemia frequently into 50s- States she is eating well. However restricts carbs!      PAST MEDICAL & SURGICAL HISTORY:  Anemia    Asthma    HLD (hyperlipidemia)    Pacemaker    Bladder cancer    HTN (hypertension)    Parathyroid tumor    Liver cyst    Hydronephrosis  has right Nephrostomy tube - dressing intact    Bradycardia    H/O myomectomy    Presence of cardiac pacemaker    H/O hydronephrosis  s/p Right Perc nephrostomy tube placement 02/2021, exchange 06/2021           MEDICATIONS  (STANDING):  cholecalciferol 1000 Unit(s) Oral daily  enoxaparin Injectable 40 milliGRAM(s) SubCutaneous every 24 hours  polyethylene glycol 3350 17 Gram(s) Oral daily  senna 2 Tablet(s) Oral at bedtime    MEDICATIONS  (PRN):  acetaminophen     Tablet .. 650 milliGRAM(s) Oral every 6 hours PRN Temp greater or equal to 38C (100.4F), Mild Pain (1 - 3), Moderate Pain (4 - 6)  melatonin 3 milliGRAM(s) Oral at bedtime PRN Insomnia  ondansetron Injectable 4 milliGRAM(s) IV Push every 8 hours PRN Nausea and/or Vomiting      FAMILY HISTORY:  Family history of prostate cancer (Father)    Family history of CHF (congestive heart failure) (Father)    Family history of atrial fibrillation (Father)        SOCIAL HISTORY:      REVIEW OF SYSTEMS:  CONSTITUTIONAL: No fever, weight loss, or fatigue  EYES: No eye pain, visual disturbances, or discharge  ENT:  No difficulty hearing, tinnitus, vertigo; No sinus or throat pain  NECK: No pain or stiffness  RESPIRATORY: No cough, wheezing, chills or hemoptysis; No Shortness of Breath  CARDIOVASCULAR: No chest pain, palpitations, passing out, dizziness, or leg swelling  GASTROINTESTINAL: No abdominal or epigastric pain. No nausea, vomiting, or hematemesis; No diarrhea or constipation. No melena or hematochezia.  GENITOURINARY: No dysuria, frequency, hematuria, or incontinence  NEUROLOGICAL: No headaches, memory loss, loss of strength, numbness, or tremors  SKIN: No itching, burning, rashes, or lesions   LYMPH Nodes: No enlarged glands  ENDOCRINE: No heat or cold intolerance; No hair loss  MUSCULOSKELETAL: No joint pain or swelling; No muscle, back, or extremity pain  PSYCHIATRIC: No depression, anxiety, mood swings, or difficulty sleeping  HEME/LYMPH: No easy bruising, or bleeding gums  ALLERGY AND IMMUNOLOGIC: No hives or eczema	        Vital Signs Last 24 Hrs  T(C): 37.1 (08 Mar 2022 12:58), Max: 37.1 (08 Mar 2022 12:58)  T(F): 98.7 (08 Mar 2022 12:58), Max: 98.7 (08 Mar 2022 12:58)  HR: 69 (08 Mar 2022 12:58) (62 - 70)  BP: 104/64 (08 Mar 2022 12:58) (104/64 - 127/75)  BP(mean): --  RR: 14 (08 Mar 2022 12:58) (14 - 18)  SpO2: 100% (08 Mar 2022 12:58) (100% - 100%)      Constitutional:    HEENT: nad    Neck:  No JVD, bruits or thyromegaly    Respiratory:  Clear without rales or rhonchi    Cardiovascular:  RR without murmur, rub or gallop.    Gastrointestinal: Soft without hepatosplenomegaly.    Extremities: without cyanosis, clubbing or edema.    Neurological:  Oriented   x   3   . No gross sensory or motor defects.        LABS:                        11.6   5.11  )-----------( 231      ( 08 Mar 2022 05:59 )             36.5     03-08    138  |  104  |  25<H>  ----------------------------<  79  3.9   |  28  |  1.17    Ca    9.9      08 Mar 2022 05:58  Mg     2.4     03-07              CAPILLARY BLOOD GLUCOSE      POCT Blood Glucose.: 84 mg/dL (08 Mar 2022 10:31)  POCT Blood Glucose.: 76 mg/dL (08 Mar 2022 05:46)  POCT Blood Glucose.: 77 mg/dL (08 Mar 2022 01:54)  POCT Blood Glucose.: 96 mg/dL (07 Mar 2022 20:48)  POCT Blood Glucose.: 91 mg/dL (07 Mar 2022 17:00)      RADIOLOGY & ADDITIONAL STUDIES:

## 2022-03-08 NOTE — CONSULT NOTE ADULT - PROBLEM SELECTOR RECOMMENDATION 9
likely due to poor po intake  rec overnight fasting and day time tomorrow   check fsg q4-6 h  check Insulin/c-peptide/glucose and cortisol when fsg <70 and terminate the fast  iv d5 by bedside  d/w prim team

## 2022-03-08 NOTE — PROGRESS NOTE ADULT - SUBJECTIVE AND OBJECTIVE BOX
Time of Visit:  Patient seen and examined.     MEDICATIONS  (STANDING):  cholecalciferol 1000 Unit(s) Oral daily  enoxaparin Injectable 40 milliGRAM(s) SubCutaneous every 24 hours  polyethylene glycol 3350 17 Gram(s) Oral daily  senna 2 Tablet(s) Oral at bedtime      MEDICATIONS  (PRN):  acetaminophen     Tablet .. 650 milliGRAM(s) Oral every 6 hours PRN Temp greater or equal to 38C (100.4F), Mild Pain (1 - 3), Moderate Pain (4 - 6)  bisacodyl Suppository 10 milliGRAM(s) Rectal daily PRN Constipation  melatonin 3 milliGRAM(s) Oral at bedtime PRN Insomnia  ondansetron Injectable 4 milliGRAM(s) IV Push every 8 hours PRN Nausea and/or Vomiting       Medications up to date at time of exam.      PHYSICAL EXAMINATION:  Patient has no new complaints.  GENERAL: The patient is a well-developed, well-nourished, in no apparent distress.     Vital Signs Last 24 Hrs  T(C): 37.1 (08 Mar 2022 12:58), Max: 37.1 (08 Mar 2022 12:58)  T(F): 98.7 (08 Mar 2022 12:58), Max: 98.7 (08 Mar 2022 12:58)  HR: 69 (08 Mar 2022 12:58) (62 - 69)  BP: 104/64 (08 Mar 2022 12:58) (104/64 - 121/67)  BP(mean): --  RR: 14 (08 Mar 2022 12:58) (14 - 18)  SpO2: 100% (08 Mar 2022 12:58) (100% - 100%)   (if applicable)    Chest Tube (if applicable)    HEENT: Head is normocephalic and atraumatic. Extraocular muscles are intact. Mucous membranes are moist.     NECK: Supple, no palpable adenopathy.    LUNGS: Clear to auscultation, no wheezing, rales, or rhonchi.    HEART: Regular rate and rhythm without murmur.    ABDOMEN: Soft, nontender, and nondistended.  No hepatosplenomegaly is noted.    : No painful voiding, no pelvic pain    EXTREMITIES: Without any cyanosis, clubbing, rash, lesions or edema.    NEUROLOGIC: Awake, alert, oriented, grossly intact    SKIN: Warm, dry, good turgor.      LABS:                        11.6   5.11  )-----------( 231      ( 08 Mar 2022 05:59 )             36.5     03-08    138  |  104  |  25<H>  ----------------------------<  79  3.9   |  28  |  1.17    Ca    9.9      08 Mar 2022 05:58  Mg     2.4     03-07                          MICROBIOLOGY: (if applicable)    RADIOLOGY & ADDITIONAL STUDIES:  EKG:   CXR:  ECHO:    IMPRESSION: 56y Female PAST MEDICAL & SURGICAL HISTORY:  Anemia    Asthma    HLD (hyperlipidemia)    Pacemaker    Bladder cancer    HTN (hypertension)    Parathyroid tumor    Liver cyst    Hydronephrosis  has right Nephrostomy tube - dressing intact    Bradycardia    H/O myomectomy    Presence of cardiac pacemaker    H/O hydronephrosis  s/p Right Perc nephrostomy tube placement 02/2021, exchange 06/2021     p/w           IMP: This is a  56 yr old woman  bradycardia (s/p pacemaker placement- [employer] Model Number - PM 2210, Serial number [id number], Implant 3/9/2010, most recent battery change 2/8/2022), asthma, myomectomy, bladder CA (on chemo Gemzar, Carboplatin, s/p nephrostomy tube on right kidney), Parathyroid tumor, HTN, and HLD, who came in with 5 day history of intermittent chest pain and hypoglycemia. Pat noted with multiple b/l lung nodules on CT chest most like due to metastatic disease.  Pat is aware of lesions and being monitored by Dr Hoyt  ( oncology) .  Currently no SOB or cough       Sugg:  - Hyperinsulinemia , followed by endo    - Monitor blood sugar   - Albuterol inhaler  2 puff q6h prn   - Pat do not requiring supp O2 at this time   - Pain control , refused morphine and percocet   - Nephrostomy tube care , schedule for nephrostomy tube change 3/9  - F/U as out pat with Dr Hoyt ( onco)   - DVT GI prophy

## 2022-03-08 NOTE — PROGRESS NOTE ADULT - SUBJECTIVE AND OBJECTIVE BOX
NP Note discussed with  Primary Attending; Dr Ochoa    Patient is a 56y old  Female who presents with a chief complaint of Hypoglycemia and chest pain (08 Mar 2022 14:42)      INTERVAL HPI/OVERNIGHT EVENTS: Patient seen and examined earlier this am;  no new complaints    MEDICATIONS  (STANDING):  cholecalciferol 1000 Unit(s) Oral daily  enoxaparin Injectable 40 milliGRAM(s) SubCutaneous every 24 hours  polyethylene glycol 3350 17 Gram(s) Oral daily  senna 2 Tablet(s) Oral at bedtime    MEDICATIONS  (PRN):  acetaminophen     Tablet .. 650 milliGRAM(s) Oral every 6 hours PRN Temp greater or equal to 38C (100.4F), Mild Pain (1 - 3), Moderate Pain (4 - 6)  melatonin 3 milliGRAM(s) Oral at bedtime PRN Insomnia  ondansetron Injectable 4 milliGRAM(s) IV Push every 8 hours PRN Nausea and/or Vomiting      __________________________________________________  REVIEW OF SYSTEMS:    CONSTITUTIONAL: No fever,   EYES: no acute visual disturbances  NECK: No pain or stiffness  RESPIRATORY: No cough; No shortness of breath  CARDIOVASCULAR: No chest pain, no palpitations  GASTROINTESTINAL: No pain. No nausea or vomiting; No diarrhea   NEUROLOGICAL: No headache or numbness, no tremors  MUSCULOSKELETAL: No joint pain, no muscle pain  GENITOURINARY: no dysuria, no frequency, no hesitancy  ALL OTHER  ROS negative        Vital Signs Last 24 Hrs  T(C): 37.1 (08 Mar 2022 12:58), Max: 37.1 (08 Mar 2022 12:58)  T(F): 98.7 (08 Mar 2022 12:58), Max: 98.7 (08 Mar 2022 12:58)  HR: 69 (08 Mar 2022 12:58) (62 - 70)  BP: 104/64 (08 Mar 2022 12:58) (104/64 - 127/75)  BP(mean): --  RR: 14 (08 Mar 2022 12:58) (14 - 18)  SpO2: 100% (08 Mar 2022 12:58) (100% - 100%)    ________________________________________________  PHYSICAL EXAM:  GENERAL: NAD  HEENT: Normocephalic; atraumatic, neck supple  CHEST/LUNG: Breathing nonlabored; Clear to auscultation bilaterally.  ABDOMEN: Soft, Nontender, Nondistended; Bowel sounds present  EXTREMITIES: +2 bilateral radial pulses. No cyanosis; no edema  SKIN: warm and dry; no rash  NERVOUS SYSTEM:  Awake and alert; Oriented  to place, person and time ; no new deficits    _________________________________________________  LABS:                        11.6   5.11  )-----------( 231      ( 08 Mar 2022 05:59 )             36.5     03-08    138  |  104  |  25<H>  ----------------------------<  79  3.9   |  28  |  1.17    Ca    9.9      08 Mar 2022 05:58  Mg     2.4     03-07          CAPILLARY BLOOD GLUCOSE      POCT Blood Glucose.: 98 mg/dL (08 Mar 2022 15:01)  POCT Blood Glucose.: 84 mg/dL (08 Mar 2022 10:31)  POCT Blood Glucose.: 76 mg/dL (08 Mar 2022 05:46)  POCT Blood Glucose.: 77 mg/dL (08 Mar 2022 01:54)  POCT Blood Glucose.: 96 mg/dL (07 Mar 2022 20:48)  POCT Blood Glucose.: 91 mg/dL (07 Mar 2022 17:00)

## 2022-03-08 NOTE — PROGRESS NOTE ADULT - ASSESSMENT
56F, w/ PMHx of bradycardia (s/p pacemaker placement- [employer] Model Number - PM 2210, Serial number [id number], Implant 3/9/2010, most recent battery change 2/8/2022), asthma, myomectomy, bladder CA (on chemo Gemzar, Carboplatin, s/p nephrostomy tube on right kidney), Parathyroid tumor, HTN, and HLD, who came in with 5 day history of intermittent chest pain and low sugars. Admitted for hypoglycemia and chest pain.  Initially monitored on tele then transferred to medicine   Chest Xray with suspicion for lung metastasis. CT chest abd and pelvis with suspicion of lung metastasis   Hospital course complicated with finding of Hyperinsulinemia, Endo consult in progress.   Pt pending IR guided nephrostomy tube exchange for tomorrow

## 2022-03-08 NOTE — PROGRESS NOTE ADULT - PROBLEM SELECTOR PLAN 10
-discharge disposition back home pending Endo consult (available 3/8, primary team to call in AM), and IR guided nephrostomy tube exchange on Wednesday Pending Right nephrostomy tube exchange tomorrow  Endocrine work up

## 2022-03-08 NOTE — PROGRESS NOTE ADULT - ASSESSMENT
_________________________________________________________________________________________  ========>>  M E D I C A L   A T T E N D I N G    F O L L O W  U P  N O T E  <<=========  -----------------------------------------------------------------------------------------------------    - Patient seen and examined by me approximately thirty minutes ago.   - In summary,  JEF HOUSE is a 56y year old woman admitted with CP and hypoglycemia   - Patient today overall doing ok, comfortable, eating OK.       Pt with some pain in the left lower flank still, taking tylenol , declines any narcotics including tramadol     pt also reports constipation, no BM for a few days >> took laxities today     ==================>> REVIEW OF SYSTEM <<=================    GEN: no fever, no chills, pain  as above   RESP: no SOB, no cough, no sputum  CVS: no chest pain, no palpitations, no edema  GI: no abdominal pain, no nausea, no constipation, no diarrhea, eating ok   : no dysuria, no frequency, no hematuria : good urine flow from nephrostomy AND from bladder   Neuro: no headache, no dizziness  Derm : no itching, no rash    ==================>> PHYSICAL EXAM <<=================    GEN: A&O X 3 , NAD , comfortable, pleasant, calm , cachectic   HEENT: NCAT, PERRL, MMM, hearing intact  Neck: supple , no JVD appreciated  CVS: S1S2 , regular , No M/R/G appreciated  PULM: CTA B/L,  no W/R/R appreciated  ABD.: soft. non tender, non distended,  bowel sounds present  Extrem: intact pulses , no edema      nephrostomy in place and draining to leg bag   PSYCH : normal mood,  not anxious                             ( Note written / Date of service 03-08-22 )    ==================>> MEDICATIONS <<====================    cholecalciferol 1000 Unit(s) Oral daily  enoxaparin Injectable 40 milliGRAM(s) SubCutaneous every 24 hours  polyethylene glycol 3350 17 Gram(s) Oral daily  senna 2 Tablet(s) Oral at bedtime    MEDICATIONS  (PRN):  acetaminophen     Tablet .. 650 milliGRAM(s) Oral every 6 hours PRN Temp greater or equal to 38C (100.4F), Mild Pain (1 - 3), Moderate Pain (4 - 6)  bisacodyl Suppository 10 milliGRAM(s) Rectal daily PRN Constipation  melatonin 3 milliGRAM(s) Oral at bedtime PRN Insomnia  ondansetron Injectable 4 milliGRAM(s) IV Push every 8 hours PRN Nausea and/or Vomiting    ___________  Active diet:  Diet, NPO after Midnight:      NPO Start Date: 08-Mar-2022,   NPO Start Time: 23:59  Diet, Regular:   No Beef  No Caffeine  No Dairy  No Pork  No Tomatoes  ___________________    ==================>> VITAL SIGNS <<==================    Vital Signs Last 24 HrsT(C): 37.1 (03-08-22 @ 20:43)  T(F): 98.7 (03-08-22 @ 20:43), Max: 98.7 (03-08-22 @ 12:58)  HR: 62 (03-08-22 @ 20:43) (62 - 69)  BP: 131/70 (03-08-22 @ 20:43)  RR: 17 (03-08-22 @ 20:43) (14 - 18)  SpO2: 98% (03-08-22 @ 20:43) (98% - 100%)      CAPILLARY BLOOD GLUCOSE  POCT Blood Glucose.: 84 mg/dL (08 Mar 2022 21:54)  POCT Blood Glucose.: 93 mg/dL (08 Mar 2022 16:55)  POCT Blood Glucose.: 98 mg/dL (08 Mar 2022 15:01)  POCT Blood Glucose.: 84 mg/dL (08 Mar 2022 10:31)  POCT Blood Glucose.: 76 mg/dL (08 Mar 2022 05:46)  POCT Blood Glucose.: 77 mg/dL (08 Mar 2022 01:54)     ==================>> LAB AND IMAGING <<==================                        11.6   5.11  )-----------( 231      ( 08 Mar 2022 05:59 )             36.5        03-08    138  |  104  |  25<H>  ----------------------------<  79  3.9   |  28  |  1.17    Ca    9.9      08 Mar 2022 05:58  Mg     2.4     03-07      WBC count:   5.11 <<== ,  6.32 <<== ,  5.72 <<==   Hemoglobin:   11.6 <<==,  11.2 <<==,  11.8 <<==  platelets:  231 <==, 204 <==, 225 <==, 230 <==    Creatinine:  1.17  <<==, 1.06  <<==, 0.93  <<==, 1.05  <<==, 1.23  <<==  Sodium:   138  <==, 136  <==, 139  <==, 139  <==, 146  <==       AST:          14 <== , 16 <== , 18 <==      ALT:        16  <== , 19  <== , 19  <==      AP:        93  <=, 98  <=, 98  <=     Bili:        0.3  <=, 0.3  <=, 0.3  <=    _______________________  C U L T U R E S :      COVID-19 PCR: NotDetec (03-08-22 @ 19:08)  COVID-19 PCR: NotDetec (03-02-22 @ 15:54)  SARS-CoV-2: NotDetec (02-23-22 @ 18:37)    < from: CT Abdomen and Pelvis No Cont (03.04.22 @ 14:49) >  IMPRESSION:  Evaluation of the solid organs, vascular structures and GI tract is   limited without oral and IV contrast.  Bilateral lung nodules likely metastatic. 2 left lower lobe nodules are   cavitary. In light of cavitation, cannot exclude infection.  Right nephrostomy tube unchanged. No right hydronephrosis.  Reidentified near complete obliteration of the bladder lumen by irregular   soft tissue consistent with history of neoplasm.  Stable chronic severe left hydronephrosis with renal cortical atrophy.  Retroperitoneal adenopathy.  < end of copied text >    ___________________________________________________________________________________  ===============>>  A S S E S S M E N T   A N D   P L A N <<===============  ------------------------------------------------------------------------------------------    · Assessment	  Patient is a 56F, w/ PMHx of bradycardia (s/p pacemaker placement- [employer] Model Number - PM 2210, Serial number [id number], Implant 3/9/2010, most recent battery change 2/8/2022), asthma, myomectomy, bladder CA (on chemo Gemzar, Carboplatin, s/p nephrostomy tube on right kidney), Parathyroid tumor, HTN, and HLD, who came in with 5 day history of intermittent chest pain and low sugars. Admitted for hypoglycemia and chest pain.     Problem/Plan - 1:  ·  Problem: Hypoglycemia.   pt has been eating well here but FS have donnie low normal   Monitor glucose.    outpatient Insulin level and confirmatory inpatient serum insulin level  / c-peptide are elevated       needs further workup for reason of increased insulin secretion / production          endocrine consult appreciated      Problem/Plan - 2:  ·  Problem: Chest pain.  unclear etiology   ·  Plan: Cardiac etiology ruled out by cardiology  Continue pain mgmt  Dr. Rosario, cardiology, following.    ** flank pain ; unclear etiology       ? related to metastatic disease ? constipation ?         continue supportive care / pain mgmt          bowel regimen         monitor     Problem/Plan - 3:  ·  Problem: UTI (urinary tract infection).   ·  Plan: UA +  Urine culture negative   finished Cefpodoxime until 3/4.  >> pt states she is nearing  / is due to nephrostomy change >> NP to d/w IR in AM >> need to change nephrostomy before DC plan home         >> arranged for tomorrow with anesthesia     Problem/Plan - 4:  ·  Problem: Bladder cancer with metastatic disease, including lung  Next chemo was scheduled for 3/8/22  nephrostomy tube needs to be changed  follow cat scan as above   CT scan as above ( pt already aware of findings for the most part, previously )   to follow up as OP     Problem/Plan - 5:  ·  Problem: Pacemaker.   ·  Plan: Secondary to symptomatic bradycardia  Pt has [employer] Model Number - PM 2210, Serial number [id number], Implant 3/9/2010  Last battery change 2/8/2022. > healing well      Problem/Plan - 6:  ·  Problem: Multiple lung nodules.   Follow up outpatient w/Dr. Hoyt.     likely related to changes post chemo      Problem/Plan - 7:  ·  Problem: Parathyroid tumor.   ·  Plan: Plan for surgical management in the future  Follows an outpatient endocrinologist.     Problem/Plan - 8:  ·  Problem: HLD (hyperlipidemia).   ·  Plan: Not on any meds at home  Continue supportive care.     Problem/Plan - 9:  ·  Problem: HTN (hypertension).   ·  Plan: Controlled  Not on any meds  Monitor BP.     Problem/Plan - 10:  ·  Problem: Prophylactic measure.   ·  Plan; DVT PPX: Lovenox.    --------------------------------------------  Case discussed with pt, NP...   Education given on findings and plan of care  ___________________________  H. NIEVES Ochoa.  Pager: [phone]

## 2022-03-08 NOTE — CONSULT NOTE ADULT - ASSESSMENT
Patient is a 56F, w/ PMHx of bradycardia (s/p pacemaker placement- [employer] Model Number - PM 2210, Serial number [id number], Implant 3/9/2010, most recent battery change 2/8/2022), asthma, myomectomy, bladder CA (on chemo Gemzar, Carboplatin, s/p nephrostomy tube on right kidney), Parathyroid tumor, HTN, and HLD, who came in with 5 day history of intermittent chest pain and low sugars.   Found to have hypoglycemia. Pt admits to having hypoglycemia frequently into 50s for many months- States she is eating well. However restricts carbs!

## 2022-03-09 NOTE — CHART NOTE - NSCHARTNOTEFT_GEN_A_CORE
EVENT: POCT Blood Glucose.: 84 mg/dL (03-08-22 @ 21:54)  POCT Blood Glucose.: 93 mg/dL (03-08-22 @ 16:55)  POCT Blood Glucose.: 98 mg/dL (03-08-22 @ 15:01)  POCT Blood Glucose.: 84 mg/dL (03-08-22 @ 10:31)  POCT Blood Glucose.: 76 mg/dL (03-08-22 @ 05:46)  POCT Blood Glucose.: 77 mg/dL (03-08-22 @ 01:54)  POCT Blood Glucose.: 96 mg/dL (03-07-22 @ 20:48)  POCT Blood Glucose.: 91 mg/dL (03-07-22 @ 17:00)  POCT Blood Glucose.: 105 mg/dL (03-07-22 @ 13:55)  POCT Blood Glucose.: 292 mg/dL (03-07-22 @ 09:44)  POCT Blood Glucose.: 94 mg/dL (03-07-22 @ 04:44)  POCT Blood Glucose.: 95 mg/dL (03-06-22 @ 23:52)  POCT Blood Glucose.: 117 mg/dL (03-06-22 @ 19:12)  POCT Blood Glucose.: 109 mg/dL (03-06-22 @ 15:36)  POCT Blood Glucose.: 153 mg/dL (03-06-22 @ 10:01)  POCT Blood Glucose.: 82 mg/dL (03-06-22 @ 06:42)  POCT Blood Glucose.: 99 mg/dL (03-06-22 @ 01:08)      OBJECTIVE:  Vital Signs Last 24 Hrs  T(C): 37.1 (08 Mar 2022 20:43), Max: 37.1 (08 Mar 2022 12:58)  T(F): 98.7 (08 Mar 2022 20:43), Max: 98.7 (08 Mar 2022 12:58)  HR: 62 (08 Mar 2022 20:43) (62 - 69)  BP: 131/70 (08 Mar 2022 20:43) (104/64 - 131/70)  BP(mean): --  RR: 17 (08 Mar 2022 20:43) (14 - 18)  SpO2: 98% (08 Mar 2022 20:43) (98% - 100%)    FOCUSED PHYSICAL EXAM:    LABS:                        11.6   5.11  )-----------( 231      ( 08 Mar 2022 05:59 )             36.5     03-08    138  |  104  |  25<H>  ----------------------------<  79  3.9   |  28  |  1.17    Ca    9.9      08 Mar 2022 05:58  Mg     2.4     03-07        EKG:   IMGAGING:    ASSESSMENT:  HPI:  Patient is a 56F, w/ PMHx of bradycardia (s/p pacemaker placement- [employer] Model Number - PM 2210, Serial number [id number], Implant 3/9/2010, most recent battery change 2/8/2022), asthma, myomectomy, bladder CA (on chemo Gemzar, Carboplatin, s/p nephrostomy tube on right kidney), Parathyroid tumor, HTN, and HLD, who came in with 5 day history of intermittent chest pain and low sugars. The chest pain comes intermittently and lasts few minutes per episode. It radiates to L chin, L chest under the breast, and R chest, and is exacerbated by exertion. Also, it is accompanied by SOB.  Patient admits to not eating much carbohydrates in her meals due to fear of cancer growth. She has many episodes of hypoglycemia at home, in which she gets lightheadedness, and it improves when she takes sugar.    Patient endorses soreness on her back and mild dysuria(chronically) due to her passing clots. She was here on 2/23/22 for UTI and was discharged with cefpodoxime. She had flank pain and worse dysuria at that time. (02 Mar 2022 15:45)      PLAN:     FOLLOW UP / RESULT: EVENT: FS q4 h now, POCT 84 mg.dl    BRIEF HPI: 56F, w/ PMH of bradycardia (s/p pacemaker placement- [employer] Model Number - PM 2210, Serial number [id number], Implant 3/9/2010, most recent battery change 2/8/2022), asthma, myomectomy, bladder CA (on chemo Gemzar, Carboplatin, s/p nephrostomy tube on right kidney), Parathyroid tumor, HTN, and HLD, who came in with 5 day history of intermittent chest pain and low sugars. Admitted for hypoglycemia and chest pain. Endo Dr. Garcia, rec overnight fasting and day time tomorrow 3/9 until 6 pm. Check FS q4-6 h. Check Insulin/c-peptide/glucose and cortisol when FS <70 and terminate the fast  iv d5 by bedside. Pt pending IR guided nephrostomy tube exchange for 3/9.    POCT Blood Glucose.: 84 mg/dL (03-08-22 @ 21:54)  POCT Blood Glucose.: 93 mg/dL (03-08-22 @ 16:55)  POCT Blood Glucose.: 98 mg/dL (03-08-22 @ 15:01)  POCT Blood Glucose.: 84 mg/dL (03-08-22 @ 10:31)  POCT Blood Glucose.: 76 mg/dL (03-08-22 @ 05:46)  POCT Blood Glucose.: 77 mg/dL (03-08-22 @ 01:54)  POCT Blood Glucose.: 96 mg/dL (03-07-22 @ 20:48)  POCT Blood Glucose.: 91 mg/dL (03-07-22 @ 17:00)  POCT Blood Glucose.: 105 mg/dL (03-07-22 @ 13:55)  POCT Blood Glucose.: 292 mg/dL (03-07-22 @ 09:44)  POCT Blood Glucose.: 94 mg/dL (03-07-22 @ 04:44)  POCT Blood Glucose.: 95 mg/dL (03-06-22 @ 23:52)  POCT Blood Glucose.: 117 mg/dL (03-06-22 @ 19:12)  POCT Blood Glucose.: 109 mg/dL (03-06-22 @ 15:36)  POCT Blood Glucose.: 153 mg/dL (03-06-22 @ 10:01)  POCT Blood Glucose.: 82 mg/dL (03-06-22 @ 06:42)  POCT Blood Glucose.: 99 mg/dL (03-06-22 @ 01:08)    Vital Signs Last 24 Hrs  T(C): 37.1 (08 Mar 2022 20:43), Max: 37.1 (08 Mar 2022 12:58)  T(F): 98.7 (08 Mar 2022 20:43), Max: 98.7 (08 Mar 2022 12:58)  HR: 62 (08 Mar 2022 20:43) (62 - 69)  BP: 131/70 (08 Mar 2022 20:43) (104/64 - 131/70)  BP(mean): --  RR: 17 (08 Mar 2022 20:43) (14 - 18)  SpO2: 98% (08 Mar 2022 20:43) (98% - 100%)    FOCUSED PHYSICAL EXAM:  NEURO: Alert, oriented X 3  RESP: Even, unlabored  CV: S1 S2, regular  ABD: Nephrostomy tube in place    LABS:                        11.6   5.11  )-----------( 231      ( 08 Mar 2022 05:59 )             36.5     03-08    138  |  104  |  25<H>  ----------------------------<  79  3.9   |  28  |  1.17    Ca    9.9      08 Mar 2022 05:58  Mg     2.4     03-07    PLAN:   1. Monitor POCT BS, ordered labs when less than 70 mmHg    FOLLOW UP / RESULT: POCT, ordered labs when POCT BS  below 70 mmHg

## 2022-03-09 NOTE — PROGRESS NOTE ADULT - SUBJECTIVE AND OBJECTIVE BOX
HPI:  56 YOF admitted with hypoglycemia.  Fasting challenge overnight, labs sent.  S/p nephrostomy tube change in IR.    OVERNIGHT EVENTS:  No new overnight events.  Seen and examined at bedside.     REVIEW OF SYSTEMS:      CONSTITUTIONAL: No fever  EYES: no acute visual disturbances  NECK: No pain or stiffness  RESPIRATORY: No cough; No shortness of breath  CARDIOVASCULAR: No chest pain, no palpitations  GASTROINTESTINAL: + pain s/p nephrostomy tube exchange. No nausea, vomiting or diarrhea   NEUROLOGICAL: No headache or numbness, no tremors  MUSCULOSKELETAL: No joint pain, no muscle pain  GENITOURINARY: no dysuria, no frequency, no hesitancy  PSYCHIATRY: no depression, no anxiety  ALL OTHER  ROS negative        Vital Signs Last 24 Hrs  T(C): 36.4 (09 Mar 2022 13:34), Max: 37.1 (08 Mar 2022 20:43)  T(F): 97.5 (09 Mar 2022 13:34), Max: 98.7 (08 Mar 2022 20:43)  HR: 63 (09 Mar 2022 13:34) (57 - 82)  BP: 121/47 (09 Mar 2022 13:34) (114/49 - 142/84)  BP(mean): 93 (09 Mar 2022 12:20) (93 - 103)  RR: 18 (09 Mar 2022 13:34) (14 - 23)  SpO2: 95% (09 Mar 2022 13:34) (95% - 100%)    ________________________________________________  PHYSICAL EXAM:    GENERAL: NAD  HEENT: Normocephalic; conjunctivae and sclerae clear;  NECK : supple, no JVD  CHEST/LUNG: Clear to auscultation; Nonlabored  HEART: S1 S2  regular  ABDOMEN: Soft, Nontender, Nondistended; Bowel sounds present  EXTREMITIES: no cyanosis; no LE edema; no calf tenderness  SKIN: warm and dry; No rashes or lesions  NERVOUS SYSTEM:  Alert; no new deficits    _________________________________________________  CURRENT MEDICATIONS:    MEDICATIONS  (STANDING):  cholecalciferol 1000 Unit(s) Oral daily  enoxaparin Injectable 40 milliGRAM(s) SubCutaneous every 24 hours  lactated ringers. 500 milliLiter(s) (75 mL/Hr) IV Continuous <Continuous>  phenazopyridine 200 milliGRAM(s) Oral once  polyethylene glycol 3350 17 Gram(s) Oral daily  senna 2 Tablet(s) Oral at bedtime    MEDICATIONS  (PRN):  acetaminophen     Tablet .. 650 milliGRAM(s) Oral every 6 hours PRN Temp greater or equal to 38C (100.4F), Mild Pain (1 - 3), Moderate Pain (4 - 6)  bisacodyl Suppository 10 milliGRAM(s) Rectal daily PRN Constipation  melatonin 3 milliGRAM(s) Oral at bedtime PRN Insomnia  morphine  - Injectable 2 milliGRAM(s) IV Push every 3 hours PRN Severe Pain (7 - 10)  ondansetron Injectable 4 milliGRAM(s) IV Push every 8 hours PRN Nausea and/or Vomiting      __________________________________________________  LABS:                          12.6   7.57  )-----------( 249      ( 09 Mar 2022 07:12 )             37.6     03-09    138  |  105  |  28<H>  ----------------------------<  84  3.8   |  27  |  1.01    Ca    10.0      09 Mar 2022 07:12      PT/INR - ( 09 Mar 2022 07:12 )   PT: 12.6 sec;   INR: 1.06 ratio         PTT - ( 09 Mar 2022 07:12 )  PTT:29.0 sec    CAPILLARY BLOOD GLUCOSE      POCT Blood Glucose.: 89 mg/dL (09 Mar 2022 13:06)  POCT Blood Glucose.: 78 mg/dL (09 Mar 2022 10:44)  POCT Blood Glucose.: 101 mg/dL (09 Mar 2022 10:03)  POCT Blood Glucose.: 75 mg/dL (09 Mar 2022 08:53)  POCT Blood Glucose.: 63 mg/dL (09 Mar 2022 07:47)  POCT Blood Glucose.: 79 mg/dL (09 Mar 2022 06:05)  POCT Blood Glucose.: 117 mg/dL (09 Mar 2022 03:28)  POCT Blood Glucose.: 55 mg/dL (09 Mar 2022 02:24)  POCT Blood Glucose.: 99 mg/dL (09 Mar 2022 01:11)  POCT Blood Glucose.: 84 mg/dL (08 Mar 2022 21:54)  POCT Blood Glucose.: 93 mg/dL (08 Mar 2022 16:55)      __________________________________________________  RADIOLOGY & ADDITIONAL TESTS:    Imaging Personally Reviewed:  YES    < from: CT Abdomen and Pelvis No Cont (03.04.22 @ 14:49) >  IMPRESSION:    Evaluation of the solid organs, vascular structures and GI tract is   limited without oral and IV contrast.    Bilateral lung nodules likely metastatic. 2 left lower lobe nodules are   cavitary. In light of cavitation, cannot exclude infection.    Right nephrostomy tube unchanged. No right hydronephrosis.    Reidentified near complete obliteration of the bladder lumen by irregular   soft tissue consistent with history of neoplasm.    Stable chronic severe left hydronephrosis with renal cortical atrophy.    Retroperitoneal adenopathy.    < end of copied text >    Consultant(s) Notes Reviewed:   YES     Plan of care was discussed with patient and /or primary care giver; all questions and concerns were addressed and care was aligned with patient's wishes.    Plan discussed with attending and consulting physicians.

## 2022-03-09 NOTE — PROGRESS NOTE ADULT - ASSESSMENT
56F, w/ PMHx of bradycardia (s/p pacemaker placement- [employer] Model Number - PM 2210, Serial number [id number], Implant 3/9/2010, most recent battery change 2/8/2022), asthma, myomectomy, bladder CA (on chemo Gemzar, Carboplatin, s/p nephrostomy tube on right kidney), Parathyroid tumor, HTN, and HLD, who came in with 5 day history of intermittent chest pain and low sugars. Admitted for hypoglycemia and chest pain.  Initially monitored on tele then transferred to medicine   Chest Xray with suspicion for lung metastasis. CT chest abd and pelvis with suspicion of lung metastasis   Hospital course complicated with finding of Hyperinsulinemia, Endo consult in progress.   Pt pending IR guided nephrostomy tube exchange for tomorrow

## 2022-03-09 NOTE — CHART NOTE - NSCHARTNOTEFT_GEN_A_CORE
EVENT: POCT Blood Glucose.: 55 mg/dL (03.09.22 @ 02:24)    Vital Signs Last 24 Hrs  T(C): 37.1 (08 Mar 2022 20:43), Max: 37.1 (08 Mar 2022 12:58)  T(F): 98.7 (08 Mar 2022 20:43), Max: 98.7 (08 Mar 2022 12:58)  HR: 62 (08 Mar 2022 20:43) (62 - 69)  BP: 131/70 (08 Mar 2022 20:43) (104/64 - 131/70)  BP(mean): --  RR: 17 (08 Mar 2022 20:43) (14 - 18)  SpO2: 98% (08 Mar 2022 20:43) (98% - 100%)    PLAN:  1. Check Insulin/c-peptide/glucose and cortisol when fsg <70 and terminate the fast  2. Glucagon  Injectable 1 diego GRAM(s) IV Push once after labs are drawn  3. Dextrose 5%. 1000 milliliter(s) (50 mL/Hr) IV Continuous   4. Continue Diet, NPO after Midnight:  NPO Start Date: 08-Mar-2022,   NPO Start Time: 23:59 (03-08-22 @ 14:45) [Active]    FOLLOW UP: POCT q4 X 10 hours then q6h; lab results Insulin/c-peptide/glucose and cortisol EVENT: POCT Blood Glucose.: 55 mg/dL (03.09.22 @ 02:24)    Vital Signs Last 24 Hrs  T(C): 37.1 (08 Mar 2022 20:43), Max: 37.1 (08 Mar 2022 12:58)  T(F): 98.7 (08 Mar 2022 20:43), Max: 98.7 (08 Mar 2022 12:58)  HR: 62 (08 Mar 2022 20:43) (62 - 69)  BP: 131/70 (08 Mar 2022 20:43) (104/64 - 131/70)  BP(mean): --  RR: 17 (08 Mar 2022 20:43) (14 - 18)  SpO2: 98% (08 Mar 2022 20:43) (98% - 100%)    PLAN:  1. Insulin/c-peptide/glucose and cortisol sent , NPO for nephrostomy tube  2. Glucagon  Injectable 1 diego GRAM(s) IV Push once after labs are drawn  3. Dextrose 5%. 1000 milliliter(s) (50 mL/Hr) IV Continuous untill diet resumed  4. Continue Diet, NPO after Midnight:  NPO Start Date: 08-Mar-2022,   NPO Start Time: 23:59 (03-08-22 @ 14:45) [Active]     FOLLOW UP: POCT q4 X 10 hours then q6h; lab results Insulin/c-peptide/glucose and cortisol EVENT: POCT Blood Glucose.: 55 mg/dL (03.09.22 @ 02:24)    Vital Signs Last 24 Hrs  T(C): 37.1 (08 Mar 2022 20:43), Max: 37.1 (08 Mar 2022 12:58)  T(F): 98.7 (08 Mar 2022 20:43), Max: 98.7 (08 Mar 2022 12:58)  HR: 62 (08 Mar 2022 20:43) (62 - 69)  BP: 131/70 (08 Mar 2022 20:43) (104/64 - 131/70)  BP(mean): --  RR: 17 (08 Mar 2022 20:43) (14 - 18)  SpO2: 98% (08 Mar 2022 20:43) (98% - 100%)    FOCUSSED PE:  NEURO: Feeling blood sugar low  RESP: Even, unlabored      PLAN:  1. Insulin/c-peptide/glucose and cortisol sent , NPO for nephrostomy tube  2. Glucagon  Injectable 1 diego GRAM(s) IV Push once after labs are drawn  3. Dextrose 5%. 1000 milliliter(s) (50 mL/Hr) IV Continuous untill diet resumed  4. Continue Diet, NPO after Midnight:  NPO Start Date: 08-Mar-2022,   NPO Start Time: 23:59 (03-08-22 @ 14:45) [Active]     FOLLOW UP: POCT q4 X 10 hours then q6h; lab results Insulin/c-peptide/glucose and cortisol

## 2022-03-09 NOTE — PROGRESS NOTE ADULT - SUBJECTIVE AND OBJECTIVE BOX
Interval Events:      Allergies    No Known Allergies    Intolerances      Endocrine/Metabolic Medications:      Vital Signs Last 24 Hrs  T(C): 36.9 (09 Mar 2022 05:37), Max: 37.1 (08 Mar 2022 12:58)  T(F): 98.4 (09 Mar 2022 05:37), Max: 98.7 (08 Mar 2022 12:58)  HR: 59 (09 Mar 2022 05:37) (59 - 69)  BP: 114/49 (09 Mar 2022 05:37) (104/64 - 131/70)  BP(mean): --  RR: 16 (09 Mar 2022 05:37) (14 - 17)  SpO2: 100% (09 Mar 2022 05:37) (98% - 100%)      PHYSICAL EXAM  All physical exam findings normal, except those marked:  General:	Alert, active, cooperative, NAD, well hydrated  .		[] Abnormal:  Neck		Normal: supple, no cervical adenopathy, no palpable thyroid  .		[] Abnormal:  Cardiovascular	Normal: regular rate, normal S1, S2, no murmurs  .		[] Abnormal:  Respiratory	Normal: no chest wall deformity, normal respiratory pattern, CTA B/L  .		[] Abnormal:  Abdominal	Normal: soft, ND, NT, bowel sounds present, no masses, no organomegaly  .		[] Abnormal:  		Normal normal genitalia, testes descended, circumcised/uncircumcised  .		Gilma stage:			Breast gilma:  .		Menstrual history:  .		[] Abnormal:  Extremities	Normal: FROM x4  .		[] Abnormal:  Skin		Normal: intact and not indurated, no rash, no acanthosis nigricans  .		[] Abnormal:  Neurologic	Normal: grossly intact  .		[] Abnormal:    LABS                        12.6   7.57  )-----------( 249      ( 09 Mar 2022 07:12 )             37.6                               138    |  105    |  28                  Calcium: 10.0  / iCa: x      (03-09 @ 07:12)    ----------------------------<  84        Magnesium: x                                3.8     |  27     |  1.01             Phosphorous: x          CAPILLARY BLOOD GLUCOSE      POCT Blood Glucose.: 101 mg/dL (09 Mar 2022 10:03)  POCT Blood Glucose.: 75 mg/dL (09 Mar 2022 08:53)  POCT Blood Glucose.: 63 mg/dL (09 Mar 2022 07:47)  POCT Blood Glucose.: 79 mg/dL (09 Mar 2022 06:05)  POCT Blood Glucose.: 117 mg/dL (09 Mar 2022 03:28)  POCT Blood Glucose.: 55 mg/dL (09 Mar 2022 02:24)  POCT Blood Glucose.: 99 mg/dL (09 Mar 2022 01:11)  POCT Blood Glucose.: 84 mg/dL (08 Mar 2022 21:54)  POCT Blood Glucose.: 93 mg/dL (08 Mar 2022 16:55)  POCT Blood Glucose.: 98 mg/dL (08 Mar 2022 15:01)  POCT Blood Glucose.: 84 mg/dL (08 Mar 2022 10:31)        Assesment/plan       Interval Events:  pt in nad    Allergies    No Known Allergies    Intolerances      Endocrine/Metabolic Medications:      Vital Signs Last 24 Hrs  T(C): 36.9 (09 Mar 2022 05:37), Max: 37.1 (08 Mar 2022 12:58)  T(F): 98.4 (09 Mar 2022 05:37), Max: 98.7 (08 Mar 2022 12:58)  HR: 59 (09 Mar 2022 05:37) (59 - 69)  BP: 114/49 (09 Mar 2022 05:37) (104/64 - 131/70)  BP(mean): --  RR: 16 (09 Mar 2022 05:37) (14 - 17)  SpO2: 100% (09 Mar 2022 05:37) (98% - 100%)      PHYSICAL EXAM  All physical exam findings normal, except those marked:  General:	Alert, active, cooperative, NAD, well hydrated  .		[] Abnormal:  Neck		Normal: supple, no cervical adenopathy, no palpable thyroid  .		[] Abnormal:  Cardiovascular	Normal: regular rate, normal S1, S2, no murmurs  .		[] Abnormal:  Respiratory	Normal: no chest wall deformity, normal respiratory pattern, CTA B/L  .		[] Abnormal:  Abdominal	Normal: soft, ND, NT, bowel sounds present, no masses, no organomegaly  .		[] Abnormal:  		Normal normal genitalia, testes descended, circumcised/uncircumcised  .		Gilma stage:			Breast gilma:  .		Menstrual history:  .		[] Abnormal:  Extremities	Normal: FROM x4  .		[] Abnormal:  Skin		Normal: intact and not indurated, no rash, no acanthosis nigricans  .		[] Abnormal:  Neurologic	Normal: grossly intact  .		[] Abnormal:    LABS                        12.6   7.57  )-----------( 249      ( 09 Mar 2022 07:12 )             37.6                               138    |  105    |  28                  Calcium: 10.0  / iCa: x      (03-09 @ 07:12)    ----------------------------<  84        Magnesium: x                                3.8     |  27     |  1.01             Phosphorous: x          CAPILLARY BLOOD GLUCOSE      POCT Blood Glucose.: 101 mg/dL (09 Mar 2022 10:03)  POCT Blood Glucose.: 75 mg/dL (09 Mar 2022 08:53)  POCT Blood Glucose.: 63 mg/dL (09 Mar 2022 07:47)  POCT Blood Glucose.: 79 mg/dL (09 Mar 2022 06:05)  POCT Blood Glucose.: 117 mg/dL (09 Mar 2022 03:28)  POCT Blood Glucose.: 55 mg/dL (09 Mar 2022 02:24)  POCT Blood Glucose.: 99 mg/dL (09 Mar 2022 01:11)  POCT Blood Glucose.: 84 mg/dL (08 Mar 2022 21:54)  POCT Blood Glucose.: 93 mg/dL (08 Mar 2022 16:55)  POCT Blood Glucose.: 98 mg/dL (08 Mar 2022 15:01)  POCT Blood Glucose.: 84 mg/dL (08 Mar 2022 10:31)        Assesment/plan    · Assessment	  Patient is a 56F, w/ PMHx of bradycardia (s/p pacemaker placement- [employer] Model Number - PM [id number], Serial number [id number], Implant 3/9/2010, most recent battery change 2/8/2022), asthma, myomectomy, bladder CA (on chemo Gemzar, Carboplatin, s/p nephrostomy tube on right kidney), Parathyroid tumor, HTN, and HLD, who came in with 5 day history of intermittent chest pain and low sugars.   Found to have hypoglycemia. Pt admits to having hypoglycemia frequently into 50s for many months- States she is eating well. However restricts carbs!     Problem/Recommendation - 1:  ·  Problem: Hypoglycemia.   ·  Recommendation: likely due to poor po intake  hypoglycemic last night   await Insulin/c-peptide/and cortisol levels  cont iv d5w  d/c npo post procedure  d/w prim team.

## 2022-03-09 NOTE — CHART NOTE - NSCHARTNOTESELECT_GEN_ALL_CORE
POCT 55 mg/dl/Event Note POCT 55 mg/dlInsulin/c-peptide/glucose and cortisol/Event Note POCT 55 mg/dl ,  lab sent for Insulin/c-peptide/glucose and cortisol/Event Note

## 2022-03-09 NOTE — PROGRESS NOTE ADULT - ASSESSMENT
_________________________________________________________________________________________  ========>>  M E D I C A L   A T T E N D I N G    F O L L O W  U P  N O T E  <<=========  -----------------------------------------------------------------------------------------------------    - Patient seen and examined by me earlier today. pt seen just post IR procedure.. tearful from pain..   - In summary,  JEF HOUSE is a 56y year old woman admitted with CP and hypoglycemia   - Patient today overall doing ok, comfortable      Pt with some pain in the left lower flank still, but now with significant pain on the right side post procedure ( per pt there were "stones" and needed to be pushed down..)            pt states she      pt also reports constipation, no BM for a few days >> took laxities today     ==================>> REVIEW OF SYSTEM <<=================    GEN: no fever, no chills, pain  as above   RESP: no SOB, no cough, no sputum  CVS: no chest pain, no palpitations, no edema  GI: no abdominal pain, no nausea, no constipation, no diarrhea, eating ok   : no dysuria, no frequency, no hematuria : good urine flow from nephrostomy AND from bladder   Neuro: no headache, no dizziness  Derm : no itching, no rash    ==================>> PHYSICAL EXAM <<=================    GEN: A&O X 3 , NAD , comfortable, pleasant, calm , cachectic   HEENT: NCAT, PERRL, MMM, hearing intact  Neck: supple , no JVD appreciated  CVS: S1S2 , regular , No M/R/G appreciated  PULM: CTA B/L,  no W/R/R appreciated  ABD.: soft. non tender, non distended,  bowel sounds present  Extrem: intact pulses , no edema      nephrostomy in place and draining to leg bag   PSYCH : normal mood,  not anxious                             ( Note written / Date of service 03-08-22 )    ==================>> MEDICATIONS <<====================    cholecalciferol 1000 Unit(s) Oral daily  enoxaparin Injectable 40 milliGRAM(s) SubCutaneous every 24 hours  polyethylene glycol 3350 17 Gram(s) Oral daily  senna 2 Tablet(s) Oral at bedtime    MEDICATIONS  (PRN):  acetaminophen     Tablet .. 650 milliGRAM(s) Oral every 6 hours PRN Temp greater or equal to 38C (100.4F), Mild Pain (1 - 3), Moderate Pain (4 - 6)  bisacodyl Suppository 10 milliGRAM(s) Rectal daily PRN Constipation  melatonin 3 milliGRAM(s) Oral at bedtime PRN Insomnia  ondansetron Injectable 4 milliGRAM(s) IV Push every 8 hours PRN Nausea and/or Vomiting    ___________  Active diet:  Diet, NPO after Midnight:      NPO Start Date: 08-Mar-2022,   NPO Start Time: 23:59  Diet, Regular:   No Beef  No Caffeine  No Dairy  No Pork  No Tomatoes  ___________________    ==================>> VITAL SIGNS <<==================    Vital Signs Last 24 HrsT(C): 37.1 (03-08-22 @ 20:43)  T(F): 98.7 (03-08-22 @ 20:43), Max: 98.7 (03-08-22 @ 12:58)  HR: 62 (03-08-22 @ 20:43) (62 - 69)  BP: 131/70 (03-08-22 @ 20:43)  RR: 17 (03-08-22 @ 20:43) (14 - 18)  SpO2: 98% (03-08-22 @ 20:43) (98% - 100%)      CAPILLARY BLOOD GLUCOSE  POCT Blood Glucose.: 84 mg/dL (08 Mar 2022 21:54)  POCT Blood Glucose.: 93 mg/dL (08 Mar 2022 16:55)  POCT Blood Glucose.: 98 mg/dL (08 Mar 2022 15:01)  POCT Blood Glucose.: 84 mg/dL (08 Mar 2022 10:31)  POCT Blood Glucose.: 76 mg/dL (08 Mar 2022 05:46)  POCT Blood Glucose.: 77 mg/dL (08 Mar 2022 01:54)     ==================>> LAB AND IMAGING <<==================                        11.6   5.11  )-----------( 231      ( 08 Mar 2022 05:59 )             36.5        03-08    138  |  104  |  25<H>  ----------------------------<  79  3.9   |  28  |  1.17    Ca    9.9      08 Mar 2022 05:58  Mg     2.4     03-07      WBC count:   5.11 <<== ,  6.32 <<== ,  5.72 <<==   Hemoglobin:   11.6 <<==,  11.2 <<==,  11.8 <<==  platelets:  231 <==, 204 <==, 225 <==, 230 <==    Creatinine:  1.17  <<==, 1.06  <<==, 0.93  <<==, 1.05  <<==, 1.23  <<==  Sodium:   138  <==, 136  <==, 139  <==, 139  <==, 146  <==       AST:          14 <== , 16 <== , 18 <==      ALT:        16  <== , 19  <== , 19  <==      AP:        93  <=, 98  <=, 98  <=     Bili:        0.3  <=, 0.3  <=, 0.3  <=    _______________________  C U L T U R E S :      COVID-19 PCR: NotDetec (03-08-22 @ 19:08)  COVID-19 PCR: NotDetec (03-02-22 @ 15:54)  SARS-CoV-2: NotDetec (02-23-22 @ 18:37)    < from: CT Abdomen and Pelvis No Cont (03.04.22 @ 14:49) >  IMPRESSION:  Evaluation of the solid organs, vascular structures and GI tract is   limited without oral and IV contrast.  Bilateral lung nodules likely metastatic. 2 left lower lobe nodules are   cavitary. In light of cavitation, cannot exclude infection.  Right nephrostomy tube unchanged. No right hydronephrosis.  Reidentified near complete obliteration of the bladder lumen by irregular   soft tissue consistent with history of neoplasm.  Stable chronic severe left hydronephrosis with renal cortical atrophy.  Retroperitoneal adenopathy.  < end of copied text >    ___________________________________________________________________________________  ===============>>  A S S E S S M E N T   A N D   P L A N <<===============  ------------------------------------------------------------------------------------------    · Assessment	  Patient is a 56F, w/ PMHx of bradycardia (s/p pacemaker placement- [employer] Model Number - PM 2210, Serial number [id number], Implant 3/9/2010, most recent battery change 2/8/2022), asthma, myomectomy, bladder CA (on chemo Gemzar, Carboplatin, s/p nephrostomy tube on right kidney), Parathyroid tumor, HTN, and HLD, who came in with 5 day history of intermittent chest pain and low sugars. Admitted for hypoglycemia and chest pain.     Problem/Plan - 1:  ·  Problem: Hypoglycemia.   pt has been eating well here but FS have donnie low normal   Monitor glucose.    outpatient Insulin level and confirmatory inpatient serum insulin level  / c-peptide are elevated       needs further workup for reason of increased insulin secretion / production          endocrine consult appreciated      Problem/Plan - 2:  ·  Problem: Chest pain.  unclear etiology   ·  Plan: Cardiac etiology ruled out by cardiology  Continue pain mgmt  Dr. Rosario, cardiology, following.    ** flank pain ; unclear etiology       ? related to metastatic disease ? constipation ?         continue supportive care / pain mgmt          bowel regimen         monitor     Problem/Plan - 3:  ·  Problem: UTI (urinary tract infection).   ·  Plan: UA +  Urine culture negative   finished Cefpodoxime until 3/4.  >> pt states she is nearing  / is due to nephrostomy change >> NP to d/w IR in AM >> need to change nephrostomy before DC plan home         >> arranged for tomorrow with anesthesia     Problem/Plan - 4:  ·  Problem: Bladder cancer with metastatic disease, including lung  Next chemo was scheduled for 3/8/22  nephrostomy tube needs to be changed  follow cat scan as above   CT scan as above ( pt already aware of findings for the most part, previously )   to follow up as OP     Problem/Plan - 5:  ·  Problem: Pacemaker.   ·  Plan: Secondary to symptomatic bradycardia  Pt has [employer] Model Number - PM 2210, Serial number [id number], Implant 3/9/2010  Last battery change 2/8/2022. > healing well      Problem/Plan - 6:  ·  Problem: Multiple lung nodules.   Follow up outpatient w/Dr. Hoyt.     likely related to changes post chemo      Problem/Plan - 7:  ·  Problem: Parathyroid tumor.   ·  Plan: Plan for surgical management in the future  Follows an outpatient endocrinologist.     Problem/Plan - 8:  ·  Problem: HLD (hyperlipidemia).   ·  Plan: Not on any meds at home  Continue supportive care.     Problem/Plan - 9:  ·  Problem: HTN (hypertension).   ·  Plan: Controlled  Not on any meds  Monitor BP.     Problem/Plan - 10:  ·  Problem: Prophylactic measure.   ·  Plan; DVT PPX: Lovenox.    --------------------------------------------  Case discussed with pt, NP...   Education given on findings and plan of care  ___________________________  H. NIEVES Ochoa.  Pager: [phone]       _________________________________________________________________________________________  ========>>  M E D I C A L   A T T E N D I N G    F O L L O W  U P  N O T E  <<=========  -----------------------------------------------------------------------------------------------------    - Patient seen and examined by me earlier today. pt seen just post IR procedure.. tearful from pain..   - In summary,  JEF HOUSE is a 56y year old woman admitted with CP and hypoglycemia   - Patient today overall doing ok, comfortable      Pt with some pain in the left lower flank still, but now with significant pain on the right side post procedure ( per pt there were "stones" and needed to be pushed down..)            pt states she got morphine in the PACU and was ok with it and wants to have more if needed      constipation improved, pt had a small BM     ==================>> REVIEW OF SYSTEM <<=================    GEN: no fever, no chills, pain  as above   RESP: no SOB, no cough, no sputum  CVS: no chest pain, no palpitations, no edema  GI: no abdominal pain, no nausea, no constipation, no diarrhea, eating ok   : no dysuria, no frequency, no hematuria : good urine flow from nephrostomy AND from bladder   Neuro: no headache, no dizziness  Derm : no itching, no rash    ==================>> PHYSICAL EXAM <<=================    GEN: A&O X 3 , NAD , comfortable, pleasant, calm , cachectic   HEENT: NCAT, PERRL, MMM, hearing intact  Neck: supple , no JVD appreciated  CVS: S1S2 , regular , No M/R/G appreciated  PULM: CTA B/L,  no W/R/R appreciated  ABD.: soft. non tender, non distended,  bowel sounds present  Extrem: intact pulses , no edema      nephrostomy in place and draining to leg bag , blood tinged urine   PSYCH : normal mood,  not anxious                             ( note written / Date of service   03-09-22 )    ==================>> MEDICATIONS <<====================    cholecalciferol 1000 Unit(s) Oral daily  enoxaparin Injectable 40 milliGRAM(s) SubCutaneous every 24 hours  lactated ringers. 500 milliLiter(s) IV Continuous <Continuous>  polyethylene glycol 3350 17 Gram(s) Oral daily  senna 2 Tablet(s) Oral at bedtime    MEDICATIONS  (PRN):  acetaminophen     Tablet .. 650 milliGRAM(s) Oral every 6 hours PRN Temp greater or equal to 38C (100.4F), Mild Pain (1 - 3), Moderate Pain (4 - 6)  bisacodyl Suppository 10 milliGRAM(s) Rectal daily PRN Constipation  melatonin 3 milliGRAM(s) Oral at bedtime PRN Insomnia  morphine  - Injectable 2 milliGRAM(s) IV Push every 3 hours PRN Severe Pain (7 - 10)  ondansetron Injectable 4 milliGRAM(s) IV Push every 8 hours PRN Nausea and/or Vomiting    ___________  Active diet:  Diet, Regular:   No Beef  No Caffeine  No Dairy  No Pork  No Tomatoes  ___________________    ==================>> VITAL SIGNS <<==================     Vital Signs Last 24 HrsT(C): 36.4 (03-09-22 @ 13:34)  T(F): 97.5 (03-09-22 @ 13:34), Max: 98.7 (03-08-22 @ 20:43)  HR: 63 (03-09-22 @ 13:34) (57 - 82)  BP: 121/47 (03-09-22 @ 13:34)  RR: 18 (03-09-22 @ 13:34) (14 - 23)  SpO2: 95% (03-09-22 @ 13:34) (95% - 100%)      CAPILLARY BLOOD GLUCOSE  POCT Blood Glucose.: 89 mg/dL (09 Mar 2022 13:06)  POCT Blood Glucose.: 78 mg/dL (09 Mar 2022 10:44)  POCT Blood Glucose.: 101 mg/dL (09 Mar 2022 10:03)  POCT Blood Glucose.: 75 mg/dL (09 Mar 2022 08:53)  POCT Blood Glucose.: 63 mg/dL (09 Mar 2022 07:47)  POCT Blood Glucose.: 79 mg/dL (09 Mar 2022 06:05)  POCT Blood Glucose.: 117 mg/dL (09 Mar 2022 03:28)  POCT Blood Glucose.: 55 mg/dL (09 Mar 2022 02:24)  POCT Blood Glucose.: 99 mg/dL (09 Mar 2022 01:11)  POCT Blood Glucose.: 84 mg/dL (08 Mar 2022 21:54)     ==================>> LAB AND IMAGING <<==================                        12.6   7.57  )-----------( 249      ( 09 Mar 2022 07:12 )             37.6        03-09    138  |  105  |  28<H>  ----------------------------<  84  3.8   |  27  |  1.01    Ca    10.0      09 Mar 2022 07:12      < from: CT Abdomen and Pelvis No Cont (03.04.22 @ 14:49) >  IMPRESSION:  Evaluation of the solid organs, vascular structures and GI tract is   limited without oral and IV contrast.  Bilateral lung nodules likely metastatic. 2 left lower lobe nodules are   cavitary. In light of cavitation, cannot exclude infection.  Right nephrostomy tube unchanged. No right hydronephrosis.  Reidentified near complete obliteration of the bladder lumen by irregular   soft tissue consistent with history of neoplasm.  Stable chronic severe left hydronephrosis with renal cortical atrophy.  Retroperitoneal adenopathy.  < end of copied text >    ___________________________________________________________________________________  ===============>>  A S S E S S M E N T   A N D   P L A N <<===============  ------------------------------------------------------------------------------------------    · Assessment	  Patient is a 56F, w/ PMHx of bradycardia (s/p pacemaker placement- [employer] Model Number - PM [id number], Serial number [id number], Implant 3/9/2010, most recent battery change 2/8/2022), asthma, myomectomy, bladder CA (on chemo Gemzar, Carboplatin, s/p nephrostomy tube on right kidney), Parathyroid tumor, HTN, and HLD, who came in with 5 day history of intermittent chest pain and low sugars. Admitted for hypoglycemia and chest pain.     Problem/Plan - 1:  ·  Problem: Hypoglycemia.   pt has been eating well here but FS have donnie low normal   Monitor glucose.    outpatient Insulin level and confirmatory inpatient serum insulin level  / c-peptide are elevated       needs further workup for reason of increased insulin secretion / production          endocrine consult appreciated : workup in process      Problem/Plan - 2:  ·  Problem: Chest pain.  unclear etiology   ·  Plan: Cardiac etiology ruled out by cardiology  Continue pain mgmt  Dr. Rosario, cardiology, following.  bowel regimen    ** flank pain ; unclear etiology       ? related to metastatic disease ? constipation ?         continue supportive care / pain mgmt          bowel regimen         monitor     Problem/Plan - 3:  ·  Problem: UTI (urinary tract infection).   ·  Plan: UA +  Urine culture negative   finished Cefpodoxime until 3/4.  post nephrostomy change  today     pain mgmt      Problem/Plan - 4:  ·  Problem: Bladder cancer with metastatic disease, including lung  Next chemo was scheduled for 3/8/22  nephrostomy tube needs to be changed  follow cat scan as above   CT scan as above ( pt already aware of findings for the most part, previously )   to follow up as OP     Problem/Plan - 5:  ·  Problem: Pacemaker.   ·  Plan: Secondary to symptomatic bradycardia  Pt has [employer] Model Number - PM 2210, Serial number [id number], Implant 3/9/2010  Last battery change 2/8/2022. > healing well      Problem/Plan - 6:  ·  Problem: Multiple lung nodules.   Follow up outpatient w/Dr. Hoyt.     likely related to changes post chemo      Problem/Plan - 7:  ·  Problem: Parathyroid tumor.   ·  Plan: Plan for surgical management in the future  Follows an outpatient endocrinologist.     Problem/Plan - 8:  ·  Problem: HLD (hyperlipidemia).   ·  Plan: Not on any meds at home  Continue supportive care.     Problem/Plan - 9:  ·  Problem: HTN (hypertension).   ·  Plan: Controlled  Not on any meds  Monitor BP.     Problem/Plan - 10:  ·  Problem: Prophylactic measure.   ·  Plan; DVT PPX: Lovenox.    --------------------------------------------  Case discussed with pt, NP, RN..   Education given on findings and plan of care  ___________________________  H. NIEVES Ochoa.  Pager: [phone]

## 2022-03-10 NOTE — PROGRESS NOTE ADULT - SUBJECTIVE AND OBJECTIVE BOX
HPI:  56 YOF admitted with hypoglycemia.  Fasting challenge overnight, labs sent.  S/p nephrostomy tube change in IR.    OVERNIGHT EVENTS:  No new overnight events.  Seen and examined at bedside.     REVIEW OF SYSTEMS:      CONSTITUTIONAL: No fever  EYES: no acute visual disturbances  NECK: No pain or stiffness  RESPIRATORY: No cough; No shortness of breath  CARDIOVASCULAR: No chest pain, no palpitations  GASTROINTESTINAL: No pain. No nausea, vomiting or diarrhea   NEUROLOGICAL: No headache or numbness, no tremors  MUSCULOSKELETAL: No joint pain, no muscle pain  GENITOURINARY: no dysuria, no frequency, no hesitancy  PSYCHIATRY: no depression, no anxiety  ALL OTHER  ROS negative        Vital Signs Last 24 Hrs  T(C): 37.4 (10 Mar 2022 12:19), Max: 37.4 (10 Mar 2022 12:19)  T(F): 99.3 (10 Mar 2022 12:19), Max: 99.3 (10 Mar 2022 12:19)  HR: 70 (10 Mar 2022 12:19) (60 - 77)  BP: 112/60 (10 Mar 2022 12:19) (112/60 - 133/62)  BP(mean): --  RR: 18 (10 Mar 2022 12:19) (17 - 18)  SpO2: 100% (10 Mar 2022 12:19) (99% - 100%)    ________________________________________________  PHYSICAL EXAM:    GENERAL: NAD  HEENT: Normocephalic; conjunctivae and sclerae clear;  NECK : supple, no JVD  CHEST/LUNG: Clear to auscultation; Nonlabored  HEART: S1 S2  regular  ABDOMEN: Soft, Nontender, Nondistended; Bowel sounds present  EXTREMITIES: no cyanosis; no LE edema; no calf tenderness  SKIN: warm and dry; No rashes or lesions  NERVOUS SYSTEM:  Alert; no new deficits    _________________________________________________  CURRENT MEDICATIONS:    MEDICATIONS  (STANDING):  cholecalciferol 1000 Unit(s) Oral daily  enoxaparin Injectable 40 milliGRAM(s) SubCutaneous every 24 hours  lactated ringers. 500 milliLiter(s) (75 mL/Hr) IV Continuous <Continuous>  polyethylene glycol 3350 17 Gram(s) Oral daily  senna 2 Tablet(s) Oral at bedtime    MEDICATIONS  (PRN):  acetaminophen     Tablet .. 650 milliGRAM(s) Oral every 6 hours PRN Temp greater or equal to 38C (100.4F), Mild Pain (1 - 3), Moderate Pain (4 - 6)  bisacodyl Suppository 10 milliGRAM(s) Rectal daily PRN Constipation  melatonin 3 milliGRAM(s) Oral at bedtime PRN Insomnia  morphine  - Injectable 2 milliGRAM(s) IV Push every 3 hours PRN Severe Pain (7 - 10)  ondansetron Injectable 4 milliGRAM(s) IV Push every 8 hours PRN Nausea and/or Vomiting      __________________________________________________  LABS:                          11.0   10.00 )-----------( 229      ( 10 Mar 2022 05:55 )             33.6     03-10    138  |  104  |  19<H>  ----------------------------<  83  4.3   |  29  |  1.09    Ca    9.9      10 Mar 2022 05:55  Phos  3.8     03-10  Mg     2.2     03-10      PT/INR - ( 09 Mar 2022 07:12 )   PT: 12.6 sec;   INR: 1.06 ratio         PTT - ( 09 Mar 2022 07:12 )  PTT:29.0 sec    CAPILLARY BLOOD GLUCOSE      POCT Blood Glucose.: 89 mg/dL (10 Mar 2022 16:44)  POCT Blood Glucose.: 84 mg/dL (10 Mar 2022 15:24)  POCT Blood Glucose.: 76 mg/dL (10 Mar 2022 14:01)  POCT Blood Glucose.: 110 mg/dL (10 Mar 2022 11:09)  POCT Blood Glucose.: 89 mg/dL (10 Mar 2022 06:34)  POCT Blood Glucose.: 74 mg/dL (10 Mar 2022 01:57)  POCT Blood Glucose.: 85 mg/dL (09 Mar 2022 22:13)      __________________________________________________  RADIOLOGY & ADDITIONAL TESTS:    Imaging Personally Reviewed:  YES    < from: CT Abdomen and Pelvis No Cont (03.04.22 @ 14:49) >  IMPRESSION:    Evaluation of the solid organs, vascular structures and GI tract is   limited without oral and IV contrast.    Bilateral lung nodules likely metastatic. 2 left lower lobe nodules are   cavitary. In light of cavitation, cannot exclude infection.    Right nephrostomy tube unchanged. No right hydronephrosis.    Reidentified near complete obliteration of the bladder lumen by irregular   soft tissue consistent with history of neoplasm.    Stable chronic severe left hydronephrosis with renal cortical atrophy.    Retroperitoneal adenopathy.    < end of copied text >    Consultant(s) Notes Reviewed:   YES     Plan of care was discussed with patient and /or primary care giver; all questions and concerns were addressed and care was aligned with patient's wishes.    Plan discussed with attending and consulting physicians.

## 2022-03-10 NOTE — CHART NOTE - NSCHARTNOTEFT_GEN_A_CORE
Assessment:   56yFemalePatient is a 56y old  Female who presents with a chief complaint of Hypoglycemia and chest pain (10 Mar 2022 11:28) Pt visited. Asleep. Pt is NPO. Pt S/P R Nephrostomy tube exchange.  Per Nsg flow sheet Po intake ~50-75 % of meal.  Labs Noted. Endo F/U Noted.        Factors impacting intake: [ ] none [ ] nausea  [ ] vomiting [ ] diarrhea [ ] constipation  [ ]chewing problems [ ] swallowing issues  [ ] other:     Diet Prescription: Diet, NPO (03-10-22 @ 09:41)    Intake:     Current Weight:   % Weight Change    Pertinent Medications: MEDICATIONS  (STANDING):  cholecalciferol 1000 Unit(s) Oral daily  enoxaparin Injectable 40 milliGRAM(s) SubCutaneous every 24 hours  lactated ringers. 500 milliLiter(s) (75 mL/Hr) IV Continuous <Continuous>  polyethylene glycol 3350 17 Gram(s) Oral daily  senna 2 Tablet(s) Oral at bedtime    MEDICATIONS  (PRN):  acetaminophen     Tablet .. 650 milliGRAM(s) Oral every 6 hours PRN Temp greater or equal to 38C (100.4F), Mild Pain (1 - 3), Moderate Pain (4 - 6)  bisacodyl Suppository 10 milliGRAM(s) Rectal daily PRN Constipation  melatonin 3 milliGRAM(s) Oral at bedtime PRN Insomnia  morphine  - Injectable 2 milliGRAM(s) IV Push every 3 hours PRN Severe Pain (7 - 10)  ondansetron Injectable 4 milliGRAM(s) IV Push every 8 hours PRN Nausea and/or Vomiting    Pertinent Labs: 03-10 Na138 mmol/L Glu 83 mg/dL K+ 4.3 mmol/L Cr  1.09 mg/dL BUN 19 mg/dL<H> 03-10 Phos 3.8 mg/dL 03-05 Alb 3.1 g/dL<L>     CAPILLARY BLOOD GLUCOSE      POCT Blood Glucose.: 110 mg/dL (10 Mar 2022 11:09)  POCT Blood Glucose.: 89 mg/dL (10 Mar 2022 06:34)  POCT Blood Glucose.: 74 mg/dL (10 Mar 2022 01:57)  POCT Blood Glucose.: 85 mg/dL (09 Mar 2022 22:13)  POCT Blood Glucose.: 74 mg/dL (09 Mar 2022 17:09)    Skin: Intact     Estimated Needs:   [ ] no change since previous assessment  [ ] recalculated:     Previous Nutrition Diagnosis:   [ ] Inadequate Energy Intake [ ]Inadequate Oral Intake [ ] Excessive Energy Intake   [ ] Underweight [ ] Increased Nutrient Needs [ ] Overweight/Obesity   [ ] Altered GI Function [ ] Unintended Weight Loss [ ] Food & Nutrition Related Knowledge Deficit [x ] Malnutrition severe    Nutrition Diagnosis is [x ] ongoing  [ ] resolved [ ] not applicable     New Nutrition Diagnosis: [ ] not applicable       Interventions:   Recommend  [ ] Change Diet To:  [x ] Nutrition Supplement When diet is advance  may consider Ensure clear TID.   [ ] Nutrition Support  [ ] Other:     Monitoring and Evaluation:   [ ] PO intake [ x ] Tolerance to diet prescription [ x ] weights [ x ] labs[ x ] follow up per protocol  [ ] other:

## 2022-03-10 NOTE — PROGRESS NOTE ADULT - SUBJECTIVE AND OBJECTIVE BOX
Interval Events:      Allergies    No Known Allergies    Intolerances      Endocrine/Metabolic Medications:      Vital Signs Last 24 Hrs  T(C): 36.9 (10 Mar 2022 05:14), Max: 36.9 (10 Mar 2022 05:14)  T(F): 98.4 (10 Mar 2022 05:14), Max: 98.4 (10 Mar 2022 05:14)  HR: 77 (10 Mar 2022 05:14) (57 - 77)  BP: 119/60 (10 Mar 2022 05:14) (119/60 - 142/84)  BP(mean): 93 (09 Mar 2022 12:20) (93 - 103)  RR: 17 (10 Mar 2022 05:14) (14 - 21)  SpO2: 99% (10 Mar 2022 05:14) (95% - 100%)      PHYSICAL EXAM  All physical exam findings normal, except those marked:  General:	Alert, active, cooperative, NAD, well hydrated  .		[] Abnormal:  Neck		Normal: supple, no cervical adenopathy, no palpable thyroid  .		[] Abnormal:  Cardiovascular	Normal: regular rate, normal S1, S2, no murmurs  .		[] Abnormal:  Respiratory	Normal: no chest wall deformity, normal respiratory pattern, CTA B/L  .		[] Abnormal:  Abdominal	Normal: soft, ND, NT, bowel sounds present, no masses, no organomegaly  .		[] Abnormal:  		Normal normal genitalia, testes descended, circumcised/uncircumcised  .		Gilma stage:			Breast gilma:  .		Menstrual history:  .		[] Abnormal:  Extremities	Normal: FROM x4  .		[] Abnormal:  Skin		Normal: intact and not indurated, no rash, no acanthosis nigricans  .		[] Abnormal:  Neurologic	Normal: grossly intact  .		[] Abnormal:    LABS                        11.0   10.00 )-----------( 229      ( 10 Mar 2022 05:55 )             33.6                               138    |  104    |  19                  Calcium: 9.9   / iCa: x      (03-10 @ 05:55)    ----------------------------<  83        Magnesium: 2.2                              4.3     |  29     |  1.09             Phosphorous: 3.8        CAPILLARY BLOOD GLUCOSE      POCT Blood Glucose.: 89 mg/dL (10 Mar 2022 06:34)  POCT Blood Glucose.: 74 mg/dL (10 Mar 2022 01:57)  POCT Blood Glucose.: 85 mg/dL (09 Mar 2022 22:13)  POCT Blood Glucose.: 74 mg/dL (09 Mar 2022 17:09)  POCT Blood Glucose.: 89 mg/dL (09 Mar 2022 13:06)  POCT Blood Glucose.: 78 mg/dL (09 Mar 2022 10:44)        Assesment/plan       Interval Events:  pt in nad    Allergies    No Known Allergies    Intolerances      Endocrine/Metabolic Medications:      Vital Signs Last 24 Hrs  T(C): 36.9 (10 Mar 2022 05:14), Max: 36.9 (10 Mar 2022 05:14)  T(F): 98.4 (10 Mar 2022 05:14), Max: 98.4 (10 Mar 2022 05:14)  HR: 77 (10 Mar 2022 05:14) (57 - 77)  BP: 119/60 (10 Mar 2022 05:14) (119/60 - 142/84)  BP(mean): 93 (09 Mar 2022 12:20) (93 - 103)  RR: 17 (10 Mar 2022 05:14) (14 - 21)  SpO2: 99% (10 Mar 2022 05:14) (95% - 100%)      PHYSICAL EXAM  All physical exam findings normal, except those marked:  General:	Alert, active, cooperative, NAD, well hydrated  .		[] Abnormal:  Neck		Normal: supple, no cervical adenopathy, no palpable thyroid  .		[] Abnormal:  Cardiovascular	Normal: regular rate, normal S1, S2, no murmurs  .		[] Abnormal:  Respiratory	Normal: no chest wall deformity, normal respiratory pattern, CTA B/L  .		[] Abnormal:  Abdominal	Normal: soft, ND, NT, bowel sounds present, no masses, no organomegaly  .		[] Abnormal:  		Normal normal genitalia, testes descended, circumcised/uncircumcised  .		Gilma stage:			Breast gilma:  .		Menstrual history:  .		[] Abnormal:  Extremities	Normal: FROM x4  .		[] Abnormal:  Skin		Normal: intact and not indurated, no rash, no acanthosis nigricans  .		[] Abnormal:  Neurologic	Normal: grossly intact  .		[] Abnormal:    LABS                        11.0   10.00 )-----------( 229      ( 10 Mar 2022 05:55 )             33.6                               138    |  104    |  19                  Calcium: 9.9   / iCa: x      (03-10 @ 05:55)    ----------------------------<  83        Magnesium: 2.2                              4.3     |  29     |  1.09             Phosphorous: 3.8        CAPILLARY BLOOD GLUCOSE      POCT Blood Glucose.: 89 mg/dL (10 Mar 2022 06:34)  POCT Blood Glucose.: 74 mg/dL (10 Mar 2022 01:57)  POCT Blood Glucose.: 85 mg/dL (09 Mar 2022 22:13)  POCT Blood Glucose.: 74 mg/dL (09 Mar 2022 17:09)  POCT Blood Glucose.: 89 mg/dL (09 Mar 2022 13:06)  POCT Blood Glucose.: 78 mg/dL (09 Mar 2022 10:44)        Assesment/plan    · Assessment	  Patient is a 56F, w/ PMHx of bradycardia (s/p pacemaker placement- [employer] Model Number - PM 2210, Serial number [id number], Implant 3/9/2010, most recent battery change 2/8/2022), asthma, myomectomy, bladder CA (on chemo Gemzar, Carboplatin, s/p nephrostomy tube on right kidney), Parathyroid tumor, HTN, and HLD, who came in with 5 day history of intermittent chest pain and low sugars.   Found to have hypoglycemia. Pt admits to having hypoglycemia frequently into 50s for many months- States she is eating well. However restricts carbs!     Problem/Recommendation - 1:  ·  Problem: Hypoglycemia.   ·  Recommendation: likely due to poor po intake  insulin normal - no corresponding glucose drawn  d/w pt likley cause due to poor oral intake   rec repeating insulin and c-pep paired with glucose when fsg <70  npo today  restart po intake after labs are drawn  d/w pt at length regarding the plan  and with prim team.

## 2022-03-10 NOTE — PROGRESS NOTE ADULT - ASSESSMENT
_________________________________________________________________________________________  ========>>  M E D I C A L   A T T E N D I N G    F O L L O W  U P  N O T E  <<=========  -----------------------------------------------------------------------------------------------------    - Patient seen and examined by me earlier today.   - In summary,  JEF HOUSE is a 56y year old woman admitted with CP and hypoglycemia   - Patient today overall doing ok, more comfortable / pain better controlled         ==================>> REVIEW OF SYSTEM <<=================    GEN: no fever, no chills, pain  as above   RESP: no SOB, no cough, no sputum  CVS: no chest pain, no palpitations, no edema  GI: no abdominal pain, no nausea, no constipation, no diarrhea, eating ok   : no dysuria, no frequency, some hematuria flow from nephrostomy AND from bladder   Neuro: no headache, no dizziness  Derm : no itching, no rash    ==================>> PHYSICAL EXAM <<=================    GEN: A&O X 3 , NAD , comfortable, pleasant, calm , cachectic   HEENT: NCAT, PERRL, MMM, hearing intact  Neck: supple , no JVD appreciated  CVS: S1S2 , regular , No M/R/G appreciated  PULM: CTA B/L,  no W/R/R appreciated  ABD.: soft. non tender, non distended,  bowel sounds present  Extrem: intact pulses , no edema      nephrostomy in place and draining to leg bag , blood tinged urine   PSYCH : normal mood,  not anxious                             ( Note written / Date of service 03-10-22 )    ==================>> MEDICATIONS <<====================    cholecalciferol 1000 Unit(s) Oral daily  enoxaparin Injectable 40 milliGRAM(s) SubCutaneous every 24 hours  lactated ringers. 500 milliLiter(s) IV Continuous <Continuous>  polyethylene glycol 3350 17 Gram(s) Oral daily  senna 2 Tablet(s) Oral at bedtime    MEDICATIONS  (PRN):  acetaminophen     Tablet .. 650 milliGRAM(s) Oral every 6 hours PRN Temp greater or equal to 38C (100.4F), Mild Pain (1 - 3), Moderate Pain (4 - 6)  bisacodyl Suppository 10 milliGRAM(s) Rectal daily PRN Constipation  melatonin 3 milliGRAM(s) Oral at bedtime PRN Insomnia  morphine  - Injectable 2 milliGRAM(s) IV Push every 3 hours PRN Severe Pain (7 - 10)  ondansetron Injectable 4 milliGRAM(s) IV Push every 8 hours PRN Nausea and/or Vomiting    ___________  Active diet:  Diet, NPO  ___________________    ==================>> VITAL SIGNS <<==================    Vital Signs Last 24 HrsT(C): 37.4 (03-10-22 @ 12:19)  T(F): 99.3 (03-10-22 @ 12:19), Max: 99.3 (03-10-22 @ 12:19)  HR: 70 (03-10-22 @ 12:19) (60 - 77)  BP: 112/60 (03-10-22 @ 12:19)  RR: 18 (03-10-22 @ 12:19) (17 - 18)  SpO2: 100% (03-10-22 @ 12:19) (99% - 100%)      CAPILLARY BLOOD GLUCOSE  POCT Blood Glucose.: 86 mg/dL (10 Mar 2022 19:16)  POCT Blood Glucose.: 89 mg/dL (10 Mar 2022 16:44)  POCT Blood Glucose.: 84 mg/dL (10 Mar 2022 15:24)  POCT Blood Glucose.: 76 mg/dL (10 Mar 2022 14:01)  POCT Blood Glucose.: 110 mg/dL (10 Mar 2022 11:09)  POCT Blood Glucose.: 89 mg/dL (10 Mar 2022 06:34)  POCT Blood Glucose.: 74 mg/dL (10 Mar 2022 01:57)  POCT Blood Glucose.: 85 mg/dL (09 Mar 2022 22:13)     ==================>> LAB AND IMAGING <<==================                        11.0   10.00 )-----------( 229      ( 10 Mar 2022 05:55 )             33.6        03-10    138  |  104  |  19<H>  ----------------------------<  83  4.3   |  29  |  1.09    Ca    9.9      10 Mar 2022 05:55  Phos  3.8     03-10  Mg     2.2     03-10      WBC count:   10.00 <<== ,  7.57 <<== ,  5.11 <<==   Hemoglobin:   11.0 <<==,  12.6 <<==,  11.6 <<==  platelets:  229 <==, 249 <==, 231 <==, 204 <==    Creatinine:  1.09  <<==, 1.01  <<==, 1.17  <<==, 1.06  <<==, 0.93  <<==  Sodium:   138  <==, 138  <==, 138  <==, 136  <==, 139  <==    < from: CT Abdomen and Pelvis No Cont (03.04.22 @ 14:49) >  IMPRESSION:  Evaluation of the solid organs, vascular structures and GI tract is   limited without oral and IV contrast.  Bilateral lung nodules likely metastatic. 2 left lower lobe nodules are   cavitary. In light of cavitation, cannot exclude infection.  Right nephrostomy tube unchanged. No right hydronephrosis.  Reidentified near complete obliteration of the bladder lumen by irregular   soft tissue consistent with history of neoplasm.  Stable chronic severe left hydronephrosis with renal cortical atrophy.  Retroperitoneal adenopathy.  < end of copied text >    ___________________________________________________________________________________  ===============>>  A S S E S S M E N T   A N D   P L A N <<===============  ------------------------------------------------------------------------------------------    · Assessment	  Patient is a 56F, w/ PMHx of bradycardia (s/p pacemaker placement- [employer] Model Number - PM [id number], Serial number [id number], Implant 3/9/2010, most recent battery change 2/8/2022), asthma, myomectomy, bladder CA (on chemo Gemzar, Carboplatin, s/p nephrostomy tube on right kidney), Parathyroid tumor, HTN, and HLD, who came in with 5 day history of intermittent chest pain and low sugars. Admitted for hypoglycemia and chest pain.     Problem/Plan - 1:  ·  Problem: Hypoglycemia.   pt has been eating well here but FS have donnie low normal   Monitor glucose.    outpatient Insulin level and confirmatory inpatient serum insulin level  / c-peptide are elevated       needs further workup for reason of increased insulin secretion / production          endocrine consult appreciated : workup in process      Problem/Plan - 2:  ·  Problem: Chest pain.  unclear etiology   ·  Plan: Cardiac etiology ruled out by cardiology  Continue pain mgmt  Dr. Rosario, cardiology, following.  bowel regimen    ** flank pain ; unclear etiology       ? related to metastatic disease ? constipation ?         continue supportive care / pain mgmt          bowel regimen         monitor     Problem/Plan - 3:  ·  Problem: UTI (urinary tract infection).   ·  Plan: UA +  Urine culture negative   finished Cefpodoxime until 3/4.  post nephrostomy change  today     pain mgmt      Problem/Plan - 4:  ·  Problem: Bladder cancer with metastatic disease, including lung  Next chemo was scheduled for 3/8/22  nephrostomy tube needs to be changed  follow cat scan as above   CT scan as above ( pt already aware of findings for the most part, previously )   to follow up as OP     Problem/Plan - 5:  ·  Problem: Pacemaker.   ·  Plan: Secondary to symptomatic bradycardia  Pt has [employer] Model Number - PM [id number], Serial number [id number], Implant 3/9/2010  Last battery change 2/8/2022. > healing well      Problem/Plan - 6:  ·  Problem: Multiple lung nodules.   Follow up outpatient w/Dr. Hoyt.     likely related to changes post chemo      Problem/Plan - 7:  ·  Problem: Parathyroid tumor.   ·  Plan: Plan for surgical management in the future  Follows an outpatient endocrinologist.     Problem/Plan - 8:  ·  Problem: HLD (hyperlipidemia).   ·  Plan: Not on any meds at home  Continue supportive care.     Problem/Plan - 9:  ·  Problem: HTN (hypertension).   ·  Plan: Controlled  Not on any meds  Monitor BP.     Problem/Plan - 10:  ·  Problem: Prophylactic measure.   ·  Plan; DVT PPX: Lovenox.    --------------------------------------------  Case discussed with pt, NP, RN..   Education given on findings and plan of care  ___________________________  H. NIEVES Ochoa.  Pager: [phone]       _________________________________________________________________________________________  ========>>  M E D I C A L   A T T E N D I N G    F O L L O W  U P  N O T E  <<=========  -----------------------------------------------------------------------------------------------------    - Patient seen and examined by me earlier today.   - In summary,  JEF HOUSE is a 56y year old woman admitted with CP and hypoglycemia   - Patient today overall doing ok, more comfortable / pain better controlled         ==================>> REVIEW OF SYSTEM <<=================    GEN: no fever, no chills, pain  as above   RESP: no SOB, no cough, no sputum  CVS: no chest pain, no palpitations, no edema  GI: no abdominal pain, no nausea, no constipation, no diarrhea, eating ok   : no dysuria, no frequency, some hematuria flow from nephrostomy AND from bladder   Neuro: no headache, no dizziness  Derm : no itching, no rash    ==================>> PHYSICAL EXAM <<=================    GEN: A&O X 3 , NAD , comfortable, pleasant, calm , cachectic   HEENT: NCAT, PERRL, MMM, hearing intact  Neck: supple , no JVD appreciated  CVS: S1S2 , regular , No M/R/G appreciated  PULM: CTA B/L,  no W/R/R appreciated  ABD.: soft. non tender, non distended,  bowel sounds present  Extrem: intact pulses , no edema      nephrostomy in place and draining to leg bag , blood tinged urine   PSYCH : normal mood,  not anxious                             ( Note written / Date of service 03-10-22 )    ==================>> MEDICATIONS <<====================    cholecalciferol 1000 Unit(s) Oral daily  enoxaparin Injectable 40 milliGRAM(s) SubCutaneous every 24 hours  lactated ringers. 500 milliLiter(s) IV Continuous <Continuous>  polyethylene glycol 3350 17 Gram(s) Oral daily  senna 2 Tablet(s) Oral at bedtime    MEDICATIONS  (PRN):  acetaminophen     Tablet .. 650 milliGRAM(s) Oral every 6 hours PRN Temp greater or equal to 38C (100.4F), Mild Pain (1 - 3), Moderate Pain (4 - 6)  bisacodyl Suppository 10 milliGRAM(s) Rectal daily PRN Constipation  melatonin 3 milliGRAM(s) Oral at bedtime PRN Insomnia  morphine  - Injectable 2 milliGRAM(s) IV Push every 3 hours PRN Severe Pain (7 - 10)  ondansetron Injectable 4 milliGRAM(s) IV Push every 8 hours PRN Nausea and/or Vomiting    ___________  Active diet:  Diet, NPO  ___________________    ==================>> VITAL SIGNS <<==================    Vital Signs Last 24 HrsT(C): 37.4 (03-10-22 @ 12:19)  T(F): 99.3 (03-10-22 @ 12:19), Max: 99.3 (03-10-22 @ 12:19)  HR: 70 (03-10-22 @ 12:19) (60 - 77)  BP: 112/60 (03-10-22 @ 12:19)  RR: 18 (03-10-22 @ 12:19) (17 - 18)  SpO2: 100% (03-10-22 @ 12:19) (99% - 100%)      CAPILLARY BLOOD GLUCOSE  POCT Blood Glucose.: 86 mg/dL (10 Mar 2022 19:16)  POCT Blood Glucose.: 89 mg/dL (10 Mar 2022 16:44)  POCT Blood Glucose.: 84 mg/dL (10 Mar 2022 15:24)  POCT Blood Glucose.: 76 mg/dL (10 Mar 2022 14:01)  POCT Blood Glucose.: 110 mg/dL (10 Mar 2022 11:09)  POCT Blood Glucose.: 89 mg/dL (10 Mar 2022 06:34)  POCT Blood Glucose.: 74 mg/dL (10 Mar 2022 01:57)  POCT Blood Glucose.: 85 mg/dL (09 Mar 2022 22:13)     ==================>> LAB AND IMAGING <<==================                        11.0   10.00 )-----------( 229      ( 10 Mar 2022 05:55 )             33.6        03-10    138  |  104  |  19<H>  ----------------------------<  83  4.3   |  29  |  1.09    Ca    9.9      10 Mar 2022 05:55  Phos  3.8     03-10  Mg     2.2     03-10      WBC count:   10.00 <<== ,  7.57 <<== ,  5.11 <<==   Hemoglobin:   11.0 <<==,  12.6 <<==,  11.6 <<==  platelets:  229 <==, 249 <==, 231 <==, 204 <==    Creatinine:  1.09  <<==, 1.01  <<==, 1.17  <<==, 1.06  <<==, 0.93  <<==  Sodium:   138  <==, 138  <==, 138  <==, 136  <==, 139  <==    < from: CT Abdomen and Pelvis No Cont (03.04.22 @ 14:49) >  IMPRESSION:  Evaluation of the solid organs, vascular structures and GI tract is   limited without oral and IV contrast.  Bilateral lung nodules likely metastatic. 2 left lower lobe nodules are   cavitary. In light of cavitation, cannot exclude infection.  Right nephrostomy tube unchanged. No right hydronephrosis.  Reidentified near complete obliteration of the bladder lumen by irregular   soft tissue consistent with history of neoplasm.  Stable chronic severe left hydronephrosis with renal cortical atrophy.  Retroperitoneal adenopathy.  < end of copied text >    ___________________________________________________________________________________  ===============>>  A S S E S S M E N T   A N D   P L A N <<===============  ------------------------------------------------------------------------------------------    · Assessment	  Patient is a 56F, w/ PMHx of bradycardia (s/p pacemaker placement- [employer] Model Number - PM [id number], Serial number [id number], Implant 3/9/2010, most recent battery change 2/8/2022), asthma, myomectomy, bladder CA (on chemo Gemzar, Carboplatin, s/p nephrostomy tube on right kidney), Parathyroid tumor, HTN, and HLD, who came in with 5 day history of intermittent chest pain and low sugars. Admitted for hypoglycemia and chest pain.     Problem/Plan - 1:  ·  Problem: Hypoglycemia.   pt has been eating well here but FS have donnie low normal   Monitor glucose.    outpatient Insulin level and confirmatory inpatient serum insulin level  / c-peptide are elevated       needs further workup for reason of increased insulin secretion / production          endocrine consult appreciated : workup in process             pt is kept NPO !! >> resume diet as soon as possible as pt with severe protein calorie malnutrition      Problem/Plan - 2:  ·  Problem: Chest pain.  unclear etiology  > resolved   ·  Plan: Cardiac etiology ruled out by cardiology  Continue pain mgmt  Dr. Rosario, cardiology, following.  bowel regimen    ** flank pain ; unclear etiology       ? related to metastatic disease ? constipation ?         continue supportive care / pain mgmt          bowel regimen         monitor     Problem/Plan - 3:  ·  Problem: UTI treated   finished Cefpodoxime until 3/4.  post nephrostomy change  with some blood tinged urine      monitor      Problem/Plan - 4:  ·  Problem: Bladder cancer with metastatic disease, including lung  Next chemo was scheduled for 3/8/22  nephrostomy tube needs to be changed  follow cat scan as above   CT scan as above ( pt already aware of findings for the most part, previously )   to follow up as OP     Problem/Plan - 5:  ·  Problem: Pacemaker.   ·  Plan: Secondary to symptomatic bradycardia  Pt has [employer] Model Number - PM 2210, Serial number [id number], Implant 3/9/2010  Last battery change 2/8/2022. > healing well      Problem/Plan - 6:  ·  Problem: Multiple lung nodules.   Follow up outpatient w/Dr. Hoyt.     likely related to changes post chemo      Problem/Plan - 7:  ·  Problem: Parathyroid tumor.   ·  Plan: Plan for surgical management in the future  Follows an outpatient endocrinologist.     Problem/Plan - 8:  ·  Problem: HLD (hyperlipidemia).   ·  Plan: Not on any meds at home  Continue supportive care.     Problem/Plan - 9:  ·  Problem: HTN (hypertension).   ·  Plan: Controlled  Not on any meds  Monitor BP.     Problem/Plan - 10:  ·  Problem: Prophylactic measure.   ·  Plan; DVT PPX: Lovenox.    --------------------------------------------  Case discussed with pt, NP,   Education given on findings and plan of care  ___________________________  HLeslee Ochoa D.O.  Pager: [phone]

## 2022-03-10 NOTE — PROGRESS NOTE ADULT - ASSESSMENT
56F, w/ PMHx of bradycardia (s/p pacemaker placement- [employer] Model Number - PM 2210, Serial number [id number], Implant 3/9/2010, most recent battery change 2/8/2022), asthma, myomectomy, bladder CA (on chemo Gemzar, Carboplatin, s/p nephrostomy tube on right kidney), Parathyroid tumor, HTN, and HLD, who came in with 5 day history of intermittent chest pain and low sugars. Admitted for hypoglycemia and chest pain.  Initially monitored on tele then transferred to medicine   Chest Xray with suspicion for lung metastasis. CT chest abd and pelvis with suspicion of lung metastasis   Hospital course complicated with finding of Hyperinsulinemia, Endo consult in progress.   Pt pending IR guided nephrostomy tube exchange for tomorrow

## 2022-03-10 NOTE — PROGRESS NOTE ADULT - SUBJECTIVE AND OBJECTIVE BOX
Time of Visit:  Patient seen and examined.     MEDICATIONS  (STANDING):  cholecalciferol 1000 Unit(s) Oral daily  enoxaparin Injectable 40 milliGRAM(s) SubCutaneous every 24 hours  lactated ringers. 500 milliLiter(s) (75 mL/Hr) IV Continuous <Continuous>  polyethylene glycol 3350 17 Gram(s) Oral daily  senna 2 Tablet(s) Oral at bedtime      MEDICATIONS  (PRN):  acetaminophen     Tablet .. 650 milliGRAM(s) Oral every 6 hours PRN Temp greater or equal to 38C (100.4F), Mild Pain (1 - 3), Moderate Pain (4 - 6)  bisacodyl Suppository 10 milliGRAM(s) Rectal daily PRN Constipation  melatonin 3 milliGRAM(s) Oral at bedtime PRN Insomnia  morphine  - Injectable 2 milliGRAM(s) IV Push every 3 hours PRN Severe Pain (7 - 10)  ondansetron Injectable 4 milliGRAM(s) IV Push every 8 hours PRN Nausea and/or Vomiting       Medications up to date at time of exam.    ROS; No fever, chills, cough, congestion on exam. Denies SOB.   PHYSICAL EXAMINATION:  Vital Signs Last 24 Hrs  T(C): 37.4 (10 Mar 2022 12:19), Max: 37.4 (10 Mar 2022 12:19)  T(F): 99.3 (10 Mar 2022 12:19), Max: 99.3 (10 Mar 2022 12:19)  HR: 70 (10 Mar 2022 12:19) (60 - 77)  BP: 112/60 (10 Mar 2022 12:19) (112/60 - 133/62)  BP(mean): --  RR: 18 (10 Mar 2022 12:19) (17 - 18)  SpO2: 100% (10 Mar 2022 12:19) (95% - 100%)   (if applicable)    General : Alert and oriented. Able to answer question with no SOB. No acute distress.     HEENT: Head is normocephalic and atraumatic. No nasal tenderness. Extraocular muscles are intact. Mucous membranes are moist.     NECK: Supple, no palpable adenopathy.    LUNGS: Clear to auscultation bilaterally with no wheezing, rales, or rhonchi. No use of accessory muscle.     HEART: S1 S2 Regular rate and no click/ rub.     ABDOMEN: Soft, nontender, and nondistended. Active bowel sounds.     EXTREMITIES: Without any cyanosis, clubbing, rash, lesions or edema.    NEUROLOGIC: Awake, alert, oriented.     SKIN: Warm and moist . Non diaphoretic.       LABS:                        11.0   10.00 )-----------( 229      ( 10 Mar 2022 05:55 )             33.6     03-10    138  |  104  |  19<H>  ----------------------------<  83  4.3   |  29  |  1.09    Ca    9.9      10 Mar 2022 05:55  Phos  3.8     03-10  Mg     2.2     03-10      PT/INR - ( 09 Mar 2022 07:12 )   PT: 12.6 sec;   INR: 1.06 ratio         PTT - ( 09 Mar 2022 07:12 )  PTT:29.0 sec                    MICROBIOLOGY: (if applicable)    RADIOLOGY & ADDITIONAL STUDIES:  EKG:   CXR:  ECHO:    IMPRESSION: 56y Female PAST MEDICAL & SURGICAL HISTORY:  Anemia    Asthma    HLD (hyperlipidemia)    Pacemaker    Bladder cancer    HTN (hypertension)    Parathyroid tumor    Liver cyst    Hydronephrosis  has right Nephrostomy tube - dressing intact    Bradycardia    H/O myomectomy    Presence of cardiac pacemaker    H/O hydronephrosis  s/p Right Perc nephrostomy tube placement 02/2021, exchange 06/2021    Impression: This is a 55 Y/O Female presented with a 5 days of intermittent chest pain and Hypoglycemia. Has Asthma but no exacerbation on exam. Has Myomectomy, Bladder CA ( On chemo Gemzar, Carboplatin, s/p nephrostomy tube on right kidney), Parathyroid tumor. Patient noted on CT Chest with multiple B/L Lung Nodules most likely due to Metastatic Disease. 03-08-22,03-02-22 Negative PCR for Covid 19.      Suggestion:  -O2 saturation 98% room air. So far saturating good room air.    - Hyperinsulinemia , followed by endo    - Monitor blood sugar   -Can have PRN  Albuterol inhaler  2 puff Q6 Hours.    - Pat do not requiring supp O2 at this time   - Pain control , PRN morphine .    - Nephrostomy tube care  - F/U as out pat with Dr Hoyt ( onco)   - DVT GI prophylactic. On Lovenox 40 mg SQ daily.

## 2022-03-11 NOTE — CHART NOTE - NSCHARTNOTEFT_GEN_A_CORE
Pt with POCT glucose of 67mg/dl, feels weak otherwise remains A&Ox3/neurologically intact   chart reviewed  Patient is a 52 year old Woman with hx of bradycardia (s/p pacemaker placement- [employer] Model Number - PM 2210, Serial number [id number], Implant 3/9/2010, most recent battery change 2/8/2022), asthma, myomectomy, bladder CA (on chemo Gemzar, Carboplatin, s/p nephrostomy tube on right kidney), Parathyroid tumor, HTN, and HLD, who on 3/2 presented to the ED for 5 day history of intermittent chest pain and low blood glucose with fingersticks. She has acknowledged having hypoglycemia frequently into 50s for many months- In the ED she was found to have hypoglycemia.     Symptomatic hypoglycemia  labs ordered as per Endo consult (Dr Garcia) for assessment of ongoing hypoglycemia  RN collecting then pt will be treated immediately following collection of specimen

## 2022-03-11 NOTE — PROGRESS NOTE ADULT - PROBLEM SELECTOR PROBLEM 5
Severe protein-calorie malnutrition
Pacemaker
Severe protein-calorie malnutrition
Pacemaker

## 2022-03-11 NOTE — PROGRESS NOTE ADULT - PROBLEM SELECTOR PLAN 2
-less likely cardiac etiology   -Cardio Dr. Rosario
Cardiac etiology ruled out by cardiology  Continue pain mgmt  Dr. Rosario, cardiology, following
Encourage PO intake  Continue to monitor blood glucose levels
Plan as above
-now resolved, likely in setting of poor PO intake and hyperinsulinemia  -encourage adequate nutritional intake when not NPO   -nutrition recs appreciated   - Continue to monitor blood glucose levels
Plan as above
-now resolved, likely in setting of poor PO intake and hyperinsulinemia  -encourage adequate nutritional intake   -nutrition recs appreciated   -mon FS

## 2022-03-11 NOTE — PROGRESS NOTE ADULT - PROBLEM SELECTOR PROBLEM 8
Prophylactic measure
HTN (hypertension)
Prophylactic measure
Prophylactic measure
HTN (hypertension)
Prophylactic measure
HLD (hyperlipidemia)

## 2022-03-11 NOTE — PROGRESS NOTE ADULT - PROBLEM SELECTOR PROBLEM 9
HTN (hypertension)
Discharge planning issues
Prophylactic measure
Prophylactic measure
Discharge planning issues
Discharge planning issues

## 2022-03-11 NOTE — PROGRESS NOTE ADULT - PROBLEM SELECTOR PLAN 6
-has a PPM symptomatic bradycardia last battery change 2/8/2022
CXR noted above  Follow up outpatient w/Dr. Hoyt
Secondary to bradycardia  Last battery change 2/8/2022
Secondary to bradycardia  Last battery change 2/8/2022
-CXR as above   -f/u CT chest
Secondary to bradycardia  Last battery change 2/8/2022
-has a PPM symptomatic bradycardia last battery change 2/8/2022

## 2022-03-11 NOTE — PROGRESS NOTE ADULT - SUBJECTIVE AND OBJECTIVE BOX
Time of Visit:  Patient seen and examined.     MEDICATIONS  (STANDING):  cholecalciferol 1000 Unit(s) Oral daily  enoxaparin Injectable 40 milliGRAM(s) SubCutaneous every 24 hours  lactated ringers. 500 milliLiter(s) (75 mL/Hr) IV Continuous <Continuous>  polyethylene glycol 3350 17 Gram(s) Oral daily  senna 2 Tablet(s) Oral at bedtime      MEDICATIONS  (PRN):  acetaminophen     Tablet .. 650 milliGRAM(s) Oral every 6 hours PRN Temp greater or equal to 38C (100.4F), Mild Pain (1 - 3), Moderate Pain (4 - 6)  bisacodyl Suppository 10 milliGRAM(s) Rectal daily PRN Constipation  melatonin 3 milliGRAM(s) Oral at bedtime PRN Insomnia  morphine  - Injectable 2 milliGRAM(s) IV Push every 3 hours PRN Severe Pain (7 - 10)  ondansetron Injectable 4 milliGRAM(s) IV Push every 8 hours PRN Nausea and/or Vomiting       Medications up to date at time of exam.      PHYSICAL EXAMINATION:  Patient has no new complaints.  GENERAL: The patient is a well-developed, well-nourished, in no apparent distress.     Vital Signs Last 24 Hrs  T(C): 36.8 (11 Mar 2022 12:43), Max: 37.9 (10 Mar 2022 20:50)  T(F): 98.3 (11 Mar 2022 12:43), Max: 100.3 (10 Mar 2022 20:50)  HR: 77 (11 Mar 2022 12:43) (71 - 87)  BP: 100/52 (11 Mar 2022 12:43) (100/52 - 109/62)  BP(mean): 72 (11 Mar 2022 12:43) (72 - 72)  RR: 16 (11 Mar 2022 12:43) (16 - 19)  SpO2: 99% (11 Mar 2022 12:43) (97% - 100%)   (if applicable)    Chest Tube (if applicable)    HEENT: Head is normocephalic and atraumatic. Extraocular muscles are intact. Mucous membranes are moist.     NECK: Supple, no palpable adenopathy.    LUNGS: Clear to auscultation, no wheezing, rales, or rhonchi.    HEART: Regular rate and rhythm without murmur.    ABDOMEN: Soft, nontender, and nondistended.  No hepatosplenomegaly is noted.    : No painful voiding, no pelvic pain    EXTREMITIES: Without any cyanosis, clubbing, rash, lesions or edema.    NEUROLOGIC: Awake, alert, oriented, grossly intact    SKIN: Warm, dry, good turgor.      LABS:                        11.1   4.95  )-----------( 218      ( 11 Mar 2022 06:01 )             34.5     03-11    137  |  102  |  15  ----------------------------<  85  4.2   |  31  |  1.10    Ca    9.9      11 Mar 2022 06:01  Phos  3.3     03-11  Mg     2.2     03-11                          MICROBIOLOGY: (if applicable)    RADIOLOGY & ADDITIONAL STUDIES:  EKG:   CXR:  ECHO:    IMPRESSION: 56y Female PAST MEDICAL & SURGICAL HISTORY:  Anemia    Asthma    HLD (hyperlipidemia)    Pacemaker    Bladder cancer    HTN (hypertension)    Parathyroid tumor    Liver cyst    Hydronephrosis  has right Nephrostomy tube - dressing intact    Bradycardia    H/O myomectomy    Presence of cardiac pacemaker    H/O hydronephrosis  s/p Right Perc nephrostomy tube placement 02/2021, exchange 06/2021     p/w           RECOMMENDATIONS:   Time of Visit:  Patient seen and examined.     MEDICATIONS  (STANDING):  cholecalciferol 1000 Unit(s) Oral daily  enoxaparin Injectable 40 milliGRAM(s) SubCutaneous every 24 hours  lactated ringers. 500 milliLiter(s) (75 mL/Hr) IV Continuous <Continuous>  polyethylene glycol 3350 17 Gram(s) Oral daily  senna 2 Tablet(s) Oral at bedtime      MEDICATIONS  (PRN):  acetaminophen     Tablet .. 650 milliGRAM(s) Oral every 6 hours PRN Temp greater or equal to 38C (100.4F), Mild Pain (1 - 3), Moderate Pain (4 - 6)  bisacodyl Suppository 10 milliGRAM(s) Rectal daily PRN Constipation  melatonin 3 milliGRAM(s) Oral at bedtime PRN Insomnia  morphine  - Injectable 2 milliGRAM(s) IV Push every 3 hours PRN Severe Pain (7 - 10)  ondansetron Injectable 4 milliGRAM(s) IV Push every 8 hours PRN Nausea and/or Vomiting       Medications up to date at time of exam.      PHYSICAL EXAMINATION:  Patient has no new complaints.  GENERAL: The patient is a well-developed, well-nourished, in no apparent distress.     Vital Signs Last 24 Hrs  T(C): 36.8 (11 Mar 2022 12:43), Max: 37.9 (10 Mar 2022 20:50)  T(F): 98.3 (11 Mar 2022 12:43), Max: 100.3 (10 Mar 2022 20:50)  HR: 77 (11 Mar 2022 12:43) (71 - 87)  BP: 100/52 (11 Mar 2022 12:43) (100/52 - 109/62)  BP(mean): 72 (11 Mar 2022 12:43) (72 - 72)  RR: 16 (11 Mar 2022 12:43) (16 - 19)  SpO2: 99% (11 Mar 2022 12:43) (97% - 100%)   (if applicable)    Chest Tube (if applicable)    HEENT: Head is normocephalic and atraumatic. Extraocular muscles are intact. Mucous membranes are moist.     NECK: Supple, no palpable adenopathy.    LUNGS: Clear to auscultation, no wheezing, rales, or rhonchi.    HEART: Regular rate and rhythm without murmur.    ABDOMEN: Soft, nontender, and nondistended.  No hepatosplenomegaly is noted.    : No painful voiding, no pelvic pain    EXTREMITIES: Without any cyanosis, clubbing, rash, lesions or edema.    NEUROLOGIC: Awake, alert, oriented, grossly intact    SKIN: Warm, dry, good turgor.      LABS:                        11.1   4.95  )-----------( 218      ( 11 Mar 2022 06:01 )             34.5     03-11    137  |  102  |  15  ----------------------------<  85  4.2   |  31  |  1.10    Ca    9.9      11 Mar 2022 06:01  Phos  3.3     03-11  Mg     2.2     03-11                          MICROBIOLOGY: (if applicable)    RADIOLOGY & ADDITIONAL STUDIES:  EKG:   CXR:  ECHO:    IMPRESSION: 56y Female PAST MEDICAL & SURGICAL HISTORY:  Anemia    Asthma    HLD (hyperlipidemia)    Pacemaker    Bladder cancer    HTN (hypertension)    Parathyroid tumor    Liver cyst    Hydronephrosis  has right Nephrostomy tube - dressing intact    Bradycardia    H/O myomectomy    Presence of cardiac pacemaker    H/O hydronephrosis  s/p Right Perc nephrostomy tube placement 02/2021, exchange 06/2021     p/w       Impression: This is a 57 Y/O Female presented with a 5 days of intermittent chest pain and Hypoglycemia. Has Asthma but no exacerbation on exam. Has Myomectomy, Bladder CA ( On chemo Gemzar, Carboplatin, s/p nephrostomy tube on right kidney), Parathyroid tumor. Patient noted on CT Chest with multiple B/L Lung Nodules most likely due to Metastatic Disease. 03-08-22,03-02-22 Negative PCR for Covid 19.      Suggestion:  -O2 saturation 98% room air. So far saturating good room air.    - Hyperinsulinemia , followed by endo    - Monitor blood sugar   -Can have PRN  Albuterol inhaler  2 puff Q6 Hours.    - Pat do not requiring supp O2 at this time   - Pain control , PRN morphine .    - Nephrostomy tube care  - F/U as out pat with Dr Hoyt ( onco)   - DVT GI prophylactic. On Lovenox 40 mg SQ daily.

## 2022-03-11 NOTE — PROGRESS NOTE ADULT - PROBLEM SELECTOR PLAN 8
diet controlled  - Continue to monitor BP trends
DVT PPX: Lovenox
-dvt ppx: Lovenox
Not on any meds at home  Continue supportive care
-controlled, not on any BP meds   -mon BP

## 2022-03-11 NOTE — PROGRESS NOTE ADULT - PROBLEM SELECTOR PLAN 3
-recent urine culture negative   -completed Rocephin
UA +  Urine culture negative   Continue Cefpodoxime until 3/4
Completed course Rocephin
Completed course Rocephin
-recent urine culture negative   -cont Rocephin
completed antibiotic course (Rocephin)
Completed course Rocephin

## 2022-03-11 NOTE — CHART NOTE - NSCHARTNOTEFT_GEN_A_CORE
Assessment:   56yFemalePatient is a 56y old  Female who presents with a chief complaint of Hypoglycemia and chest pain (11 Mar 2022 13:49). Nutrition consult ordered verbally by NP and Endo.  Pt with Hypoglycemia . Pt has multiple Food choices. Pt states  her Dr has told her not to eat  Dairy , greasy , sugar. Offered Peanut butter  Pt refused stated too greasy, Offered Tuna sandwich refused stated it has Galeana, Offered Turkey sandwich pt stated too  salty.  Finally Pt agreed to try Boiled egg and Oat meal w B , L, D. also will offer Fresh cut up fruits and Blue berry when on Menu. D/W NP. Will suggest Appetite stimulant . D/W PCA Pt eats < 50 % of meal.       Factors impacting intake: [ ] none [ ] nausea  [ ] vomiting [ ] diarrhea [ ] constipation  [ ]chewing problems [ ] swallowing issues  [x ] other:     Diet Prescription: Diet, Regular:   No Beef  No Caffeine  No Dairy  No Pork  No Tomatoes  Supplement Feeding Modality:  Oral  Ensure Clear Cans or Servings Per Day:  1       Frequency:  Three Times a day (03-11-22 @ 16:29)    Intake: < 50 %    Current Weight:   % Weight Change    Pertinent Medications: MEDICATIONS  (STANDING):  cholecalciferol 1000 Unit(s) Oral daily  enoxaparin Injectable 40 milliGRAM(s) SubCutaneous every 24 hours  lactated ringers. 500 milliLiter(s) (75 mL/Hr) IV Continuous <Continuous>  polyethylene glycol 3350 17 Gram(s) Oral daily  senna 2 Tablet(s) Oral at bedtime    MEDICATIONS  (PRN):  acetaminophen     Tablet .. 650 milliGRAM(s) Oral every 6 hours PRN Temp greater or equal to 38C (100.4F), Mild Pain (1 - 3), Moderate Pain (4 - 6)  bisacodyl Suppository 10 milliGRAM(s) Rectal daily PRN Constipation  melatonin 3 milliGRAM(s) Oral at bedtime PRN Insomnia  morphine  - Injectable 2 milliGRAM(s) IV Push every 3 hours PRN Severe Pain (7 - 10)  ondansetron Injectable 4 milliGRAM(s) IV Push every 8 hours PRN Nausea and/or Vomiting    Pertinent Labs: 03-11 Na137 mmol/L Glu 85 mg/dL K+ 4.2 mmol/L Cr  1.10 mg/dL BUN 15 mg/dL 03-11 Phos 3.3 mg/dL 03-05 Alb 3.1 g/dL<L>     CAPILLARY BLOOD GLUCOSE      POCT Blood Glucose.: 134 mg/dL (11 Mar 2022 11:32)  POCT Blood Glucose.: 233 mg/dL (11 Mar 2022 10:15)  POCT Blood Glucose.: 81 mg/dL (11 Mar 2022 06:38)  POCT Blood Glucose.: 67 mg/dL (11 Mar 2022 01:47)  POCT Blood Glucose.: 72 mg/dL (11 Mar 2022 01:07)  POCT Blood Glucose.: 76 mg/dL (11 Mar 2022 00:31)  POCT Blood Glucose.: 206 mg/dL (10 Mar 2022 21:33)  POCT Blood Glucose.: 86 mg/dL (10 Mar 2022 19:16)    Skin:     Estimated Needs:   [ ] no change since previous assessment  [ ] recalculated:     Previous Nutrition Diagnosis:   [ ] Inadequate Energy Intake [ ]Inadequate Oral Intake [ ] Excessive Energy Intake   [ ] Underweight [ ] Increased Nutrient Needs [ ] Overweight/Obesity   [ ] Altered GI Function [ ] Unintended Weight Loss [ ] Food & Nutrition Related Knowledge Deficit [x ] Malnutrition severe    Nutrition Diagnosis is [ x] ongoing  [ ] resolved [ ] not applicable     New Nutrition Diagnosis: [ ] not applicable       Interventions:   Recommend  [ ] Change Diet To:  [x ] Nutrition Supplement Ensure clear TID   [ ] Nutrition Support  [ x] Other: Food choices Updated  (x) Appetite stimulant If Medically appropriate     Monitoring and Evaluation:   [ ] PO intake [ x ] Tolerance to diet prescription [ x ] weights [ x ] labs[ x ] follow up per protocol  [ ] other:

## 2022-03-11 NOTE — PROGRESS NOTE ADULT - SUBJECTIVE AND OBJECTIVE BOX
Interval Events:  pt in nad    Allergies    No Known Allergies    Intolerances      Endocrine/Metabolic Medications:      Vital Signs Last 24 Hrs  T(C): 36.8 (11 Mar 2022 12:43), Max: 37.9 (10 Mar 2022 20:50)  T(F): 98.3 (11 Mar 2022 12:43), Max: 100.3 (10 Mar 2022 20:50)  HR: 77 (11 Mar 2022 12:43) (71 - 87)  BP: 100/52 (11 Mar 2022 12:43) (100/52 - 109/62)  BP(mean): 72 (11 Mar 2022 12:43) (72 - 72)  RR: 16 (11 Mar 2022 12:43) (16 - 19)  SpO2: 99% (11 Mar 2022 12:43) (97% - 100%)      PHYSICAL EXAM  All physical exam findings normal, except those marked:  General:	Alert, active, cooperative, NAD, well hydrated  .		[] Abnormal:  Neck		Normal: supple, no cervical adenopathy, no palpable thyroid  .		[] Abnormal:  Cardiovascular	Normal: regular rate, normal S1, S2, no murmurs  .		[] Abnormal:  Respiratory	Normal: no chest wall deformity, normal respiratory pattern, CTA B/L  .		[] Abnormal:  Abdominal	Normal: soft, ND, NT, bowel sounds present, no masses, no organomegaly  .		[] Abnormal:  		Normal normal genitalia, testes descended, circumcised/uncircumcised  .		Gilma stage:			Breast gilma:  .		Menstrual history:  .		[] Abnormal:  Extremities	Normal: FROM x4  .		[] Abnormal:  Skin		Normal: intact and not indurated, no rash, no acanthosis nigricans  .		[] Abnormal:  Neurologic	Normal: grossly intact  .		[] Abnormal:    LABS                        11.1   4.95  )-----------( 218      ( 11 Mar 2022 06:01 )             34.5                               137    |  102    |  15                  Calcium: 9.9   / iCa: x      (03-11 @ 06:01)    ----------------------------<  85        Magnesium: 2.2                              4.2     |  31     |  1.10             Phosphorous: 3.3        CAPILLARY BLOOD GLUCOSE      POCT Blood Glucose.: 104 mg/dL (11 Mar 2022 17:17)  POCT Blood Glucose.: 134 mg/dL (11 Mar 2022 11:32)  POCT Blood Glucose.: 233 mg/dL (11 Mar 2022 10:15)  POCT Blood Glucose.: 81 mg/dL (11 Mar 2022 06:38)  POCT Blood Glucose.: 67 mg/dL (11 Mar 2022 01:47)  POCT Blood Glucose.: 72 mg/dL (11 Mar 2022 01:07)  POCT Blood Glucose.: 76 mg/dL (11 Mar 2022 00:31)  POCT Blood Glucose.: 206 mg/dL (10 Mar 2022 21:33)  POCT Blood Glucose.: 86 mg/dL (10 Mar 2022 19:16)        Assesment/plan    Patient is a 56F, w/ PMHx of bradycardia (s/p pacemaker placement- [employer] Model Number - PM 2210, Serial number [id number], Implant 3/9/2010, most recent battery change 2/8/2022), asthma, myomectomy, bladder CA (on chemo Gemzar, Carboplatin, s/p nephrostomy tube on right kidney), Parathyroid tumor, HTN, and HLD, who came in with 5 day history of intermittent chest pain and low sugars.   Found to have hypoglycemia. Pt admits to having hypoglycemia frequently into 50s for many months- States she is eating well. However restricts carbs!     Problem/Recommendation - 1:  ·  Problem: Hypoglycemia.   ·  Recommendation: likely due to poor po intake  insulin and c- peptide low normal   d/w pt likley cause due to poor oral intake  and ? due to malignancy  rec checking igf-1 and 2 levels and insulin antibodies  d/w pt at length regarding the plan  and with prim team and nutritionist.  monitor fsg   consider appetite stimulants

## 2022-03-11 NOTE — PROGRESS NOTE ADULT - PROBLEM SELECTOR PLAN 9
Pt will be medically stable for discharge when hypoglycemia resolved
DVT; continue lovenox ( am dose being held for IR procedure)
-dvt ppx: Lovenox
Pt will be medically stable for discharge when hypoglycemia resolved
Pt will be medically stable for discharge when hypoglycemia resolved
Controlled  Not on any meds  Monitor BP

## 2022-03-11 NOTE — PROGRESS NOTE ADULT - ASSESSMENT
_________________________________________________________________________________________  ========>>  M E D I C A L   A T T E N D I N G    F O L L O W  U P  N O T E  <<=========  -----------------------------------------------------------------------------------------------------    - Patient seen and examined by me earlier today.   - In summary,  JEF HOUSE is a 56y year old woman admitted with CP and hypoglycemia   - Patient today overall doing ok, more comfortable / pain better controlled         ==================>> REVIEW OF SYSTEM <<=================    GEN: no fever, no chills, pain  as above  ( reports fever overnight ( low grade as noted) )   RESP: no SOB, no cough, no sputum  CVS: no chest pain, no palpitations, no edema  GI: no abdominal pain, no nausea, no constipation, no diarrhea, eating ok   : no dysuria, no frequency, hematuria cleared, +flow from nephrostomy AND from bladder   Neuro: no headache, no dizziness  Derm : no itching, no rash    ==================>> PHYSICAL EXAM <<=================    GEN: A&O X 3 , NAD , comfortable, pleasant, calm , cachectic   HEENT: NCAT, PERRL, MMM, hearing intact  Neck: supple , no JVD appreciated  CVS: S1S2 , regular , No M/R/G appreciated  PULM: CTA B/L,  no W/R/R appreciated  ABD.: soft. non tender, non distended,  bowel sounds present  Extrem: intact pulses , no edema      nephrostomy in place and draining to leg bag > clear, yellow   PSYCH : normal mood,  not anxious                             ( Note written / Date of service 03-11-22 )    ==================>> MEDICATIONS <<====================    cholecalciferol 1000 Unit(s) Oral daily  enoxaparin Injectable 40 milliGRAM(s) SubCutaneous every 24 hours  lactated ringers. 500 milliLiter(s) IV Continuous <Continuous>  polyethylene glycol 3350 17 Gram(s) Oral daily  senna 2 Tablet(s) Oral at bedtime    MEDICATIONS  (PRN):  acetaminophen     Tablet .. 650 milliGRAM(s) Oral every 6 hours PRN Temp greater or equal to 38C (100.4F), Mild Pain (1 - 3), Moderate Pain (4 - 6)  bisacodyl Suppository 10 milliGRAM(s) Rectal daily PRN Constipation  melatonin 3 milliGRAM(s) Oral at bedtime PRN Insomnia  morphine  - Injectable 2 milliGRAM(s) IV Push every 3 hours PRN Severe Pain (7 - 10)  ondansetron Injectable 4 milliGRAM(s) IV Push every 8 hours PRN Nausea and/or Vomiting    ___________  Active diet:  Diet, Regular:   No Beef  No Caffeine  No Dairy  No Pork  No Tomatoes  ___________________    ==================>> VITAL SIGNS <<==================    Vital Signs Last 24 HrsT(C): 36.8 (03-11-22 @ 12:43)  T(F): 98.3 (03-11-22 @ 12:43), Max: 100.3 (03-10-22 @ 20:50)  HR: 77 (03-11-22 @ 12:43) (71 - 87)  BP: 100/52 (03-11-22 @ 12:43)  RR: 16 (03-11-22 @ 12:43) (16 - 19)  SpO2: 99% (03-11-22 @ 12:43) (97% - 100%)      CAPILLARY BLOOD GLUCOSE  POCT Blood Glucose.: 134 mg/dL (11 Mar 2022 11:32)  POCT Blood Glucose.: 233 mg/dL (11 Mar 2022 10:15)  POCT Blood Glucose.: 81 mg/dL (11 Mar 2022 06:38)  POCT Blood Glucose.: 67 mg/dL (11 Mar 2022 01:47)  POCT Blood Glucose.: 72 mg/dL (11 Mar 2022 01:07)  POCT Blood Glucose.: 76 mg/dL (11 Mar 2022 00:31)  POCT Blood Glucose.: 206 mg/dL (10 Mar 2022 21:33)  POCT Blood Glucose.: 86 mg/dL (10 Mar 2022 19:16)  POCT Blood Glucose.: 89 mg/dL (10 Mar 2022 16:44)  POCT Blood Glucose.: 84 mg/dL (10 Mar 2022 15:24)  POCT Blood Glucose.: 76 mg/dL (10 Mar 2022 14:01)     ==================>> LAB AND IMAGING <<==================                        11.1   4.95  )-----------( 218      ( 11 Mar 2022 06:01 )             34.5        03-11    137  |  102  |  15  ----------------------------<  85  4.2   |  31  |  1.10    Ca    9.9      11 Mar 2022 06:01  Phos  3.3     03-11  Mg     2.2     03-11      WBC count:   4.95 <<== ,  10.00 <<== ,  7.57 <<== ,  5.11 <<==   Hemoglobin:   11.1 <<==,  11.0 <<==,  12.6 <<==,  11.6 <<==  platelets:  218 <==, 229 <==, 249 <==, 231 <==    Creatinine:  1.10  <<==, 1.09  <<==, 1.01  <<==, 1.17  <<==, 1.06  <<==  Sodium:   137  <==, 138  <==, 138  <==, 138  <==, 136  <==    COVID-19 PCR: NotDetec (03-08-22 @ 19:08)  COVID-19 PCR: NotDetec (03-02-22 @ 15:54)  SARS-CoV-2: NotDetec (02-23-22 @ 18:37)    < from: CT Abdomen and Pelvis No Cont (03.04.22 @ 14:49) >  IMPRESSION:  Evaluation of the solid organs, vascular structures and GI tract is   limited without oral and IV contrast.  Bilateral lung nodules likely metastatic. 2 left lower lobe nodules are   cavitary. In light of cavitation, cannot exclude infection.  Right nephrostomy tube unchanged. No right hydronephrosis.  Reidentified near complete obliteration of the bladder lumen by irregular   soft tissue consistent with history of neoplasm.  Stable chronic severe left hydronephrosis with renal cortical atrophy.  Retroperitoneal adenopathy.  < end of copied text >    ___________________________________________________________________________________  ===============>>  A S S E S S M E N T   A N D   P L A N <<===============  ------------------------------------------------------------------------------------------    · Assessment	  Patient is a 56F, w/ PMHx of bradycardia (s/p pacemaker placement- [employer] Model Number - PM 2210, Serial number [id number], Implant 3/9/2010, most recent battery change 2/8/2022), asthma, myomectomy, bladder CA (on chemo Gemzar, Carboplatin, s/p nephrostomy tube on right kidney), Parathyroid tumor, HTN, and HLD, who came in with 5 day history of intermittent chest pain and low sugars. Admitted for hypoglycemia and chest pain.     Problem/Plan - 1:  ·  Problem: Hypoglycemia.   pt has been eating well here but FS have donnie low normal   Monitor glucose as above     outpatient Insulin level and confirmatory inpatient serum insulin level  / c-peptide are elevated       needs further workup for reason of increased insulin secretion / production          endocrine consult appreciated : workup in process   diet as soon as possible as pt with severe protein calorie malnutrition      Problem/Plan - 2:  ·  Problem: flank pain ; unclear etiology       ? related to metastatic disease ? constipation ?         continue supportive care / pain mgmt          bowel regimen         monitor     Problem/Plan - 3:  ·  Problem: UTI treated   finished Cefpodoxime until 3/4.  post nephrostomy change  with some blood tinged urine      monitor      Problem/Plan - 4:  ·  Problem: Bladder cancer with metastatic disease, including lung  Next chemo was scheduled for 3/8/22  nephrostomy tube needs to be changed  follow cat scan as above   CT scan as above ( pt already aware of findings for the most part, previously )   to follow up as OP     Problem/Plan - 5:  ·  Problem: Pacemaker.   ·  Plan: Secondary to symptomatic bradycardia  Pt has [employer] Model Number - PM 2210, Serial number [id number], Implant 3/9/2010  Last battery change 2/8/2022. > healing well      Problem/Plan - 6:  ·  Problem: Multiple lung nodules.   Follow up outpatient w/Dr. Hoyt.     likely related to changes post chemo      Problem/Plan - 7:  ·  Problem: Parathyroid tumor.   ·  Plan: Plan for surgical management in the future  Follows an outpatient endocrinologist.     Problem/Plan - 8:  ·  Problem: HLD (hyperlipidemia).   ·  Plan: Not on any meds at home  Continue supportive care.     Problem/Plan - 9:  ·  Problem: HTN (hypertension).   ·  Plan: Controlled  Not on any meds  Monitor BP.     Problem/Plan - 10:  ·  Problem: Prophylactic measure.   ·  Plan; DVT PPX: Lovenox.    --------------------------------------------  Case discussed with pt, NP,   Education given on findings and plan of care  ___________________________  H. NIEVES Ochoa.  Pager: [phone]

## 2022-03-11 NOTE — PROGRESS NOTE ADULT - PROBLEM SELECTOR PLAN 5
Likely in setting of advanced malignancy and chemo   Continue supportive care   Nutrition evaluation and education provided
Likely in setting of advanced malignancy and chemo   Continue supportive care   Nutrition evaluation and education provided
-likely in setting of advanced malignancy and chemo   -continue  supportive measures   -s/p nutrition evaluation  and education
Likely in setting of advanced malignancy and chemo   Continue supportive care   Nutrition evaluation and education provided
-has a PPM symptomatic bradycardia last battery change 2/8/2022
Secondary to symptomatic bradycardia  Pt has [employer] Model Number - PM 2210, Serial number [id number], Implant 3/9/2010  Last battery change 2/8/2022
-likely in setting of advanced malignancy and chemo   -cont supportive measures   -nutrition recs appreciated

## 2022-03-11 NOTE — PROGRESS NOTE ADULT - PROBLEM SELECTOR PROBLEM 1
Hypoglycemia
Hyperinsulinemia
Hypoglycemia

## 2022-03-11 NOTE — PROGRESS NOTE ADULT - PROBLEM SELECTOR PLAN 1
Pt reports decreased carbohydrates intake secondary to concerns of cancer growth  Increased protein and fats intake education provided  Nutritionist consulted  Monitor glucose
Hypoglycemia labs collected  FU C-peptide and Cortisol  Diet restarted  Nutrition education started  Appetite stimulant offered, pt refused  Start calorie count  Dr. Garcia, Endocrine, following  Recommendations appreciated
-now resolved, likely in setting of poor PO intake   -encourage adequate nutritional intake   -nutrition recs appreciated   -mon FS
Restart NPO  FU to order Insulin, Serum Glucose, C-peptide and Cortisol when hypoglycemic  Correct glucose after labs drawn  Resume diet s/p lab collection  Dr. Garcia, Endocrine, following  Recommendations appreciated
-elevated reading outpt confirmed with inpt readings, elevated C peptide, needs further Endo work up   -Endocrine, Dr Garcia, consulted  - Patient to be NPO post MN tonight until 6pm tomorrow with Q4- 6 blood glucose monitoring. If BG < 70mg/dl/  insulin, Cpeptide , Cortisol and glucose to be sent  immediately prior to treatment. D5 to be kept at bedside.
NPO and hypoglycemic overnight  Insulin - 7.8  Glucose 55  C-peptide and Cortisol testing  Resumed diet  Dr. Garcia, Endocrine, following  Recommendations appreciated
-elevated reading outpt confirmed with inpt readings, elevated C peptide, needs further Endo work up   -Endo consult Dr. Garcia  available on 3/8, primary team to call in AM

## 2022-03-11 NOTE — PROGRESS NOTE ADULT - ASSESSMENT
56F, w/ PMHx of bradycardia (s/p pacemaker placement- [employer] Model Number - PM 2210, Serial number [id number], Implant 3/9/2010, most recent battery change 2/8/2022), asthma, myomectomy, bladder CA (on chemo Gemzar, Carboplatin, s/p nephrostomy tube on right kidney), Parathyroid tumor, HTN, and HLD, who came in with 5 day history of intermittent chest pain and low sugars. Admitted for hypoglycemia and chest pain.  Initially monitored on tele then transferred to medicine   Chest Xray with suspicion for lung metastasis. CT chest abd and pelvis with suspicion of lung metastasis   Hospital course complicated with finding of Hyperinsulinemia, Endo consult in progress.   Pt pending IR guided nephrostomy tube exchange for tomorrow

## 2022-03-11 NOTE — PROGRESS NOTE ADULT - PROBLEM SELECTOR PLAN 7
-controlled, not on any BP meds   -mon BP
-CT chest as above   -Heme/Onc f/u outpt
CT noted above  Plan as above
Plan for surgical management in the future  Follows an outpatient endocrinologist
CT noted above  Plan as above
-CT chest as above   -Heme/Onc f/u outpt
CT noted above  Plan as above

## 2022-03-11 NOTE — PROGRESS NOTE ADULT - PROBLEM SELECTOR PROBLEM 7
Multiple lung nodules
Parathyroid tumor
Multiple lung nodules
HTN (hypertension)
Multiple lung nodules

## 2022-03-11 NOTE — PROGRESS NOTE ADULT - SUBJECTIVE AND OBJECTIVE BOX
HPI:  56 YOF admitted with hypoglycemia.  Fasting challenge overnight, labs sent.  S/p nephrostomy tube change in IR.    OVERNIGHT EVENTS:  No new overnight events.  Seen and examined at bedside.     REVIEW OF SYSTEMS:      CONSTITUTIONAL: No fever  EYES: no acute visual disturbances  NECK: No pain or stiffness  RESPIRATORY: No cough; No shortness of breath  CARDIOVASCULAR: No chest pain, no palpitations  GASTROINTESTINAL: No pain. No nausea, vomiting or diarrhea   NEUROLOGICAL: No headache or numbness, no tremors  MUSCULOSKELETAL: No joint pain, no muscle pain  GENITOURINARY: + pain, no dysuria, no frequency, no hesitancy  PSYCHIATRY: no depression, no anxiety  ALL OTHER  ROS negative        Vital Signs Last 24 Hrs  T(C): 36.8 (11 Mar 2022 12:43), Max: 37.9 (10 Mar 2022 20:50)  T(F): 98.3 (11 Mar 2022 12:43), Max: 100.3 (10 Mar 2022 20:50)  HR: 77 (11 Mar 2022 12:43) (71 - 87)  BP: 100/52 (11 Mar 2022 12:43) (100/52 - 109/62)  BP(mean): 72 (11 Mar 2022 12:43) (72 - 72)  RR: 16 (11 Mar 2022 12:43) (16 - 19)  SpO2: 99% (11 Mar 2022 12:43) (97% - 100%)    ________________________________________________  PHYSICAL EXAM:    GENERAL: NAD  HEENT: Normocephalic; conjunctivae and sclerae clear;  NECK : supple, no JVD  CHEST/LUNG: Clear to auscultation; Nonlabored  HEART: S1 S2  regular  ABDOMEN: Soft, Nontender, Nondistended; Bowel sounds present  EXTREMITIES: no cyanosis; no LE edema; no calf tenderness  SKIN: warm and dry; No rashes or lesions  NERVOUS SYSTEM:  Alert; no new deficits    _________________________________________________  CURRENT MEDICATIONS:    MEDICATIONS  (STANDING):  cholecalciferol 1000 Unit(s) Oral daily  enoxaparin Injectable 40 milliGRAM(s) SubCutaneous every 24 hours  lactated ringers. 500 milliLiter(s) (75 mL/Hr) IV Continuous <Continuous>  polyethylene glycol 3350 17 Gram(s) Oral daily  senna 2 Tablet(s) Oral at bedtime    MEDICATIONS  (PRN):  acetaminophen     Tablet .. 650 milliGRAM(s) Oral every 6 hours PRN Temp greater or equal to 38C (100.4F), Mild Pain (1 - 3), Moderate Pain (4 - 6)  bisacodyl Suppository 10 milliGRAM(s) Rectal daily PRN Constipation  melatonin 3 milliGRAM(s) Oral at bedtime PRN Insomnia  morphine  - Injectable 2 milliGRAM(s) IV Push every 3 hours PRN Severe Pain (7 - 10)  ondansetron Injectable 4 milliGRAM(s) IV Push every 8 hours PRN Nausea and/or Vomiting      __________________________________________________  LABS:                          11.1   4.95  )-----------( 218      ( 11 Mar 2022 06:01 )             34.5     03-11    137  |  102  |  15  ----------------------------<  85  4.2   |  31  |  1.10    Ca    9.9      11 Mar 2022 06:01  Phos  3.3     03-11  Mg     2.2     03-11          CAPILLARY BLOOD GLUCOSE      POCT Blood Glucose.: 134 mg/dL (11 Mar 2022 11:32)  POCT Blood Glucose.: 233 mg/dL (11 Mar 2022 10:15)  POCT Blood Glucose.: 81 mg/dL (11 Mar 2022 06:38)  POCT Blood Glucose.: 67 mg/dL (11 Mar 2022 01:47)  POCT Blood Glucose.: 72 mg/dL (11 Mar 2022 01:07)  POCT Blood Glucose.: 76 mg/dL (11 Mar 2022 00:31)  POCT Blood Glucose.: 206 mg/dL (10 Mar 2022 21:33)  POCT Blood Glucose.: 86 mg/dL (10 Mar 2022 19:16)      __________________________________________________  RADIOLOGY & ADDITIONAL TESTS:    Imaging Personally Reviewed:  YES    < from: CT Abdomen and Pelvis No Cont (03.04.22 @ 14:49) >  IMPRESSION:    Evaluation of the solid organs, vascular structures and GI tract is   limited without oral and IV contrast.    Bilateral lung nodules likely metastatic. 2 left lower lobe nodules are   cavitary. In light of cavitation, cannot exclude infection.    Right nephrostomy tube unchanged. No right hydronephrosis.    Reidentified near complete obliteration of the bladder lumen by irregular   soft tissue consistent with history of neoplasm.    Stable chronic severe left hydronephrosis with renal cortical atrophy.    Retroperitoneal adenopathy.    < end of copied text >    Consultant(s) Notes Reviewed:   YES     Plan of care was discussed with patient and /or primary care giver; all questions and concerns were addressed and care was aligned with patient's wishes.    Plan discussed with attending and consulting physicians.

## 2022-03-12 NOTE — PROGRESS NOTE ADULT - SUBJECTIVE AND OBJECTIVE BOX
HPI:  Patient is a 56F, w/ PMHx of bradycardia (s/p pacemaker placement- [employer] Model Number - PM 2210, Serial number [id number], Implant 3/9/2010, most recent battery change 2/8/2022), asthma, myomectomy, bladder CA (on chemo Gemzar, Carboplatin, s/p nephrostomy tube on right kidney), Parathyroid tumor, HTN, and HLD, who came in with 5 day history of intermittent chest pain and low sugars. The chest pain comes intermittently and lasts few minutes per episode. It radiates to L chin, L chest under the breast, and R chest, and is exacerbated by exertion. Also, it is accompanied by SOB.  Patient admits to not eating much carbohydrates in her meals due to fear of cancer growth. She has many episodes of hypoglycemia at home, in which she gets lightheadedness, and it improves when she takes sugar.    Patient endorses soreness on her back and mild dysuria(chronically) due to her passing clots. She was here on 2/23/22 for UTI and was discharged with cefpodoxime. She had flank pain and worse dysuria at that time. (02 Mar 2022 15:45)      Patient is a 56y old  Female who presents with a chief complaint of Hypoglycemia and chest pain (11 Mar 2022 20:04)      INTERVAL HPI/OVERNIGHT EVENTS:  T(C): 36.7 (03-12-22 @ 04:50), Max: 36.8 (03-11-22 @ 12:43)  HR: 68 (03-12-22 @ 04:50) (65 - 77)  BP: 109/61 (03-12-22 @ 04:50) (100/52 - 109/61)  RR: 16 (03-12-22 @ 04:50) (16 - 17)  SpO2: 98% (03-12-22 @ 04:50) (98% - 100%)  Wt(kg): --  I&O's Summary    11 Mar 2022 07:01  -  12 Mar 2022 07:00  --------------------------------------------------------  IN: 0 mL / OUT: 1250 mL / NET: -1250 mL        REVIEW OF SYSTEMS: denies fever, chills, SOB, palpitations, chest pain, abdominal pain, nausea, vomitting, diarrhea, constipation, dizziness    MEDICATIONS  (STANDING):  cholecalciferol 1000 Unit(s) Oral daily  enoxaparin Injectable 40 milliGRAM(s) SubCutaneous every 24 hours  lactated ringers. 500 milliLiter(s) (75 mL/Hr) IV Continuous <Continuous>  polyethylene glycol 3350 17 Gram(s) Oral daily  senna 2 Tablet(s) Oral at bedtime    MEDICATIONS  (PRN):  acetaminophen     Tablet .. 650 milliGRAM(s) Oral every 6 hours PRN Temp greater or equal to 38C (100.4F), Mild Pain (1 - 3), Moderate Pain (4 - 6)  bisacodyl Suppository 10 milliGRAM(s) Rectal daily PRN Constipation  melatonin 3 milliGRAM(s) Oral at bedtime PRN Insomnia  morphine  - Injectable 2 milliGRAM(s) IV Push every 3 hours PRN Severe Pain (7 - 10)  ondansetron Injectable 4 milliGRAM(s) IV Push every 8 hours PRN Nausea and/or Vomiting      PHYSICAL EXAM:  GENERAL: NAD, well-groomed, well-developed  HEAD:  Atraumatic, Normocephalic  EYES: EOMI, PERRLA, conjunctiva and sclera clear  ENMT: No tonsillar erythema, exudates, or enlargement; Moist mucous membranes, Good dentition, No lesions  NECK: Supple, No JVD, Normal thyroid  NERVOUS SYSTEM:  Alert & Oriented X3, Good concentration; Motor Strength 5/5 B/L upper and lower extremities; DTRs 2+ intact and symmetric  CHEST/LUNG: Clear to percussion bilaterally; No rales, rhonchi, wheezing, or rubs  HEART: Regular rate and rhythm; No murmurs, rubs, or gallops  ABDOMEN: Soft, Nontender, Nondistended; Bowel sounds present  EXTREMITIES:  2+ Peripheral Pulses, No clubbing, cyanosis, or edema  LYMPH: No lymphadenopathy noted  SKIN: No rashes or lesions  LABS:                        12.3   5.56  )-----------( 252      ( 12 Mar 2022 08:04 )             38.3     03-12    135  |  102  |  18  ----------------------------<  93  3.8   |  28  |  1.17    Ca    10.1      12 Mar 2022 08:04  Phos  2.9     03-12  Mg     2.2     03-12          CAPILLARY BLOOD GLUCOSE      POCT Blood Glucose.: 79 mg/dL (12 Mar 2022 07:53)  POCT Blood Glucose.: 92 mg/dL (12 Mar 2022 04:22)  POCT Blood Glucose.: 92 mg/dL (11 Mar 2022 21:25)  POCT Blood Glucose.: 104 mg/dL (11 Mar 2022 17:17)  POCT Blood Glucose.: 134 mg/dL (11 Mar 2022 11:32)

## 2022-03-12 NOTE — PROGRESS NOTE ADULT - ASSESSMENT
seen and examined comforatble on bed not in any distress  c/o dullache at rt renal ( nephrostomy site)  on tylenol  she is ok  physical done  covering dr Ochoa  blood sugars are around 90  endo on case  NO INsulinoma or other  probably is second to decreased apetite sec to bladder ca   as per ENDO   ensure    apetire stimulant  if PCp agree

## 2022-03-12 NOTE — PROGRESS NOTE ADULT - SUBJECTIVE AND OBJECTIVE BOX
CARDIOLOGY     PROGRESS  NOTE   ________________________________________________    CHIEF COMPLAINT:Patient is a 56y old  Female who presents with a chief complaint of Hypoglycemia and chest pain (12 Mar 2022 10:28)  c/o pain.  	  REVIEW OF SYSTEMS:  CONSTITUTIONAL: No fever, weight loss, or fatigue  EYES: No eye pain, visual disturbances, or discharge  ENT:  No difficulty hearing, tinnitus, vertigo; No sinus or throat pain  NECK: No pain or stiffness  RESPIRATORY: No cough, wheezing, chills or hemoptysis; No Shortness of Breath  CARDIOVASCULAR: No chest pain, palpitations, passing out, dizziness, or leg swelling  GASTROINTESTINAL: No abdominal or epigastric pain. No nausea, vomiting, or hematemesis; No diarrhea or constipation. No melena or hematochezia.  GENITOURINARY: No dysuria, frequency, hematuria, or incontinence  NEUROLOGICAL: No headaches, memory loss, loss of strength, numbness, or tremors  SKIN: No itching, burning, rashes, or lesions   LYMPH Nodes: No enlarged glands  ENDOCRINE: No heat or cold intolerance; No hair loss  MUSCULOSKELETAL: No joint pain or swelling; No muscle, back, or extremity pain  PSYCHIATRIC: No depression, anxiety, mood swings, or difficulty sleeping  HEME/LYMPH: No easy bruising, or bleeding gums  ALLERGY AND IMMUNOLOGIC: No hives or eczema	    [ ] All others negative	  [ ] Unable to obtain    PHYSICAL EXAM:  T(C): 36.3 (03-12-22 @ 12:40), Max: 36.7 (03-12-22 @ 04:50)  HR: 66 (03-12-22 @ 12:40) (65 - 68)  BP: 100/71 (03-12-22 @ 12:40) (100/71 - 109/61)  RR: 14 (03-12-22 @ 12:40) (14 - 17)  SpO2: 100% (03-12-22 @ 12:40) (98% - 100%)  Wt(kg): --  I&O's Summary    11 Mar 2022 07:01  -  12 Mar 2022 07:00  --------------------------------------------------------  IN: 0 mL / OUT: 1250 mL / NET: -1250 mL        Appearance: Normal	  HEENT:   Normal oral mucosa, PERRL, EOMI	  Lymphatic: No lymphadenopathy  Cardiovascular: Normal S1 S2, No JVD, + murmurs, No edema  Respiratory: Lungs clear to auscultation	  Psychiatry: A & O x 3, Mood & affect appropriate  Gastrointestinal:  Soft, Non-tender, + BS	  Skin: No rashes, No ecchymoses, No cyanosis	  Neurologic: Non-focal  Extremities: Normal range of motion, No clubbing, cyanosis or edema  Vascular: Peripheral pulses palpable 2+ bilaterally    MEDICATIONS  (STANDING):  cholecalciferol 1000 Unit(s) Oral daily  enoxaparin Injectable 40 milliGRAM(s) SubCutaneous every 24 hours  lactated ringers. 500 milliLiter(s) (75 mL/Hr) IV Continuous <Continuous>  polyethylene glycol 3350 17 Gram(s) Oral daily  senna 2 Tablet(s) Oral at bedtime      TELEMETRY: 	    ECG:  	  RADIOLOGY:  OTHER: 	  	  LABS:	 	    CARDIAC MARKERS:                                12.3   5.56  )-----------( 252      ( 12 Mar 2022 08:04 )             38.3     03-12    135  |  102  |  18  ----------------------------<  93  3.8   |  28  |  1.17    Ca    10.1      12 Mar 2022 08:04  Phos  2.9     03-12  Mg     2.2     03-12      proBNP:   Lipid Profile:   HgA1c:   TSH:    Problem/Recommendation - 1:  ·  Problem: Hypoglycemia.   ·  Recommendation: likely due to poor po intake  insulin and c- peptide low normal   d/w pt likley cause due to poor oral intake  and ? due to malignancy  rec checking igf-1 and 2 levels and insulin antibodies  d/w pt at length regarding the plan  and with prim team and nutritionist.  monitor fsg   consider appetite stimulants      Assessment and plan  ---------------------------  Patient is a 56F, w/ PMHx of bradycardia (s/p pacemaker placement- [employer] Model Number - PM 2210, Serial number [id number], Implant 3/9/2010, most recent battery change 2/8/2022), asthma, myomectomy, bladder CA (on chemo Gemzar, Carboplatin, s/p nephrostomy tube on right kidney), Parathyroid tumor, HTN, and HLD, who came in with 5 day history of intermittent chest pain and low sugars. The chest pain comes intermittently and lasts few minutes per episode. It radiates to L chin, L chest under the breast, and R chest, and is exacerbated by exertion. Also, it is accompanied by SOB.  Patient admits to not eating much carbohydrates in her meals due to fear of cancer growth. She has many episodes of hypoglycemia at home, in which she gets lightheadedness, and it improves when she takes sugar.  chest pain, doubt cardiac  no need for any cardiac testing m ?musculoskeletal  no pe, neg D dimer  endo eval noted  ?depression may consider psych eval  may add mirtazepine for appetite stimulant

## 2022-03-13 NOTE — PROGRESS NOTE ADULT - SUBJECTIVE AND OBJECTIVE BOX
Interval Events:  pt in nad  hypoglycemia this am- last meal at 5pm    Allergies    No Known Allergies    Intolerances      Endocrine/Metabolic Medications:      Vital Signs Last 24 Hrs  T(C): 36 (13 Mar 2022 05:00), Max: 36.7 (12 Mar 2022 21:15)  T(F): 96.8 (13 Mar 2022 05:00), Max: 98 (12 Mar 2022 21:15)  HR: 69 (13 Mar 2022 05:00) (65 - 69)  BP: 117/81 (13 Mar 2022 05:00) (100/71 - 117/81)  BP(mean): --  RR: 17 (13 Mar 2022 05:00) (14 - 17)  SpO2: 99% (13 Mar 2022 05:00) (98% - 100%)      PHYSICAL EXAM  All physical exam findings normal, except those marked:  General:	Alert, active, cooperative, NAD, well hydrated  .		[] Abnormal:  Neck		Normal: supple, no cervical adenopathy, no palpable thyroid  .		[] Abnormal:  Cardiovascular	Normal: regular rate, normal S1, S2, no murmurs  .		[] Abnormal:  Respiratory	Normal: no chest wall deformity, normal respiratory pattern, CTA B/L  .		[] Abnormal:  Abdominal	Normal: soft, ND, NT, bowel sounds present, no masses, no organomegaly  .		[] Abnormal:  		Normal normal genitalia, testes descended, circumcised/uncircumcised  .		Gilma stage:			Breast gilma:  .		Menstrual history:  .		[] Abnormal:  Extremities	Normal: FROM x4  .		[] Abnormal:  Skin		Normal: intact and not indurated, no rash, no acanthosis nigricans  .		[] Abnormal:  Neurologic	Normal: grossly intact  .		[] Abnormal:    LABS                        13.9   5.02  )-----------( 270      ( 13 Mar 2022 07:49 )             43.1                               136    |  104    |  21                  Calcium: 10.8  / iCa: x      (03-13 @ 07:49)    ----------------------------<  100       Magnesium: 2.2                              4.5     |  23     |  1.15             Phosphorous: 3.4        CAPILLARY BLOOD GLUCOSE      POCT Blood Glucose.: 162 mg/dL (13 Mar 2022 08:47)  POCT Blood Glucose.: 60 mg/dL (13 Mar 2022 07:40)  POCT Blood Glucose.: 95 mg/dL (12 Mar 2022 21:28)  POCT Blood Glucose.: 81 mg/dL (12 Mar 2022 16:43)  POCT Blood Glucose.: 77 mg/dL (12 Mar 2022 11:48)        Assesment/plan        Patient is a 56F, w/ PMHx of bradycardia (s/p pacemaker placement- [employer] Model Number - PM 2210, Serial number [id number], Implant 3/9/2010, most recent battery change 2/8/2022), asthma, myomectomy, bladder CA (on chemo Gemzar, Carboplatin, s/p nephrostomy tube on right kidney), Parathyroid tumor, HTN, and HLD, who came in with 5 day history of intermittent chest pain and low sugars.   Found to have hypoglycemia. Pt admits to having hypoglycemia frequently into 50s for many months- States she is eating well. However restricts carbs!     Problem/Recommendation - 1:  ·  Problem: Hypoglycemia.   ·  Recommendation: likely due to poor po intake  insulin and c- peptide low normal   d/w pt likley cause due to poor oral intake  and ? due to malignancy  igf-1 and 2 levels and insulin antibodies- pending  d/w pt at length regarding the plan  and with prim team and nutritionist.  monitor fsg   consider appetite stimulants  calorie count and bedtime snack

## 2022-03-13 NOTE — PROGRESS NOTE ADULT - ASSESSMENT
_________________________________________________________________________________________  ========>>  M E D I C A L   A T T E N D I N G    F O L L O W  U P  N O T E  <<=========  -----------------------------------------------------------------------------------------------------    - Patient seen and examined by me earlier today.   - In summary,  JEF HOUSE is a 56y year old woman admitted with CP and hypoglycemia   - Patient today overall doing ok, more comfortable / pain overall controlled         ==================>> REVIEW OF SYSTEM <<=================    GEN: no fever, no chills, pain  as above  , reports night sweats !!   RESP: no SOB, no cough, no sputum  CVS: no chest pain, no palpitations, no edema  GI: no abdominal pain, no nausea, constipation improved , eating ok   : no dysuria, no frequency, hematuria cleared, +flow from nephrostomy AND from bladder   Neuro: no headache, no dizziness  Derm : no itching, no rash    ==================>> PHYSICAL EXAM <<=================    GEN: A&O X 3 , NAD , comfortable, pleasant, calm , cachectic   HEENT: NCAT, PERRL, MMM, hearing intact  Neck: supple , no JVD appreciated  CVS: S1S2 , regular , No M/R/G appreciated  PULM: CTA B/L,  no W/R/R appreciated  ABD.: soft. non tender, non distended,  bowel sounds present  Extrem: intact pulses , no edema      nephrostomy in place and draining to leg bag > clear, yellow   PSYCH : normal mood,  not anxious                             ( Note written / Date of service 03-13-22 )    ==================>> MEDICATIONS <<====================    cholecalciferol 1000 Unit(s) Oral daily  enoxaparin Injectable 40 milliGRAM(s) SubCutaneous every 24 hours  lactated ringers. 500 milliLiter(s) IV Continuous <Continuous>  polyethylene glycol 3350 17 Gram(s) Oral daily  senna 2 Tablet(s) Oral at bedtime    MEDICATIONS  (PRN):  acetaminophen     Tablet .. 650 milliGRAM(s) Oral every 6 hours PRN Temp greater or equal to 38C (100.4F), Mild Pain (1 - 3), Moderate Pain (4 - 6)  bisacodyl Suppository 10 milliGRAM(s) Rectal daily PRN Constipation  melatonin 3 milliGRAM(s) Oral at bedtime PRN Insomnia  morphine  - Injectable 2 milliGRAM(s) IV Push every 3 hours PRN Severe Pain (7 - 10)  ondansetron Injectable 4 milliGRAM(s) IV Push every 8 hours PRN Nausea and/or Vomiting    ___________  Active diet:  Diet, Regular:   No Beef  No Caffeine  No Dairy  No Pork  No Tomatoes  Supplement Feeding Modality:  Oral  Ensure Clear Cans or Servings Per Day:  1       Frequency:  Three Times a day  ___________________    ==================>> VITAL SIGNS <<==================    Vital Signs Last 24 HrsT(C): 36 (03-13-22 @ 05:00)  T(F): 96.8 (03-13-22 @ 05:00), Max: 98 (03-12-22 @ 21:15)  HR: 69 (03-13-22 @ 05:00) (65 - 69)  BP: 117/81 (03-13-22 @ 05:00)  RR: 17 (03-13-22 @ 05:00) (16 - 17)  SpO2: 99% (03-13-22 @ 05:00) (98% - 99%)      CAPILLARY BLOOD GLUCOSE  POCT Blood Glucose.: 64 mg/dL (13 Mar 2022 12:13)  POCT Blood Glucose.: 162 mg/dL (13 Mar 2022 08:47)  POCT Blood Glucose.: 60 mg/dL (13 Mar 2022 07:40)  POCT Blood Glucose.: 95 mg/dL (12 Mar 2022 21:28)  POCT Blood Glucose.: 81 mg/dL (12 Mar 2022 16:43)     ==================>> LAB AND IMAGING <<==================                        13.9   5.02  )-----------( 270      ( 13 Mar 2022 07:49 )             43.1        03-13    136  |  104  |  21<H>  ----------------------------<  100<H>  4.5   |  23  |  1.15    Ca    10.8<H>      13 Mar 2022 07:49  Phos  3.4     03-13  Mg     2.2     03-13    WBC count:   5.02 <<== ,  5.56 <<== ,  4.95 <<== ,  10.00 <<== ,  7.57 <<==   Hemoglobin:   13.9 <<==,  12.3 <<==,  11.1 <<==,  11.0 <<==,  12.6 <<==  platelets:  270 <==, 252 <==, 218 <==, 229 <==, 249 <==, 231 <==    Creatinine:  1.15  <<==, 1.17  <<==, 1.10  <<==, 1.09  <<==, 1.01  <<==, 1.17  <<==  Sodium:   136  <==, 135  <==, 137  <==, 138  <==, 138  <==, 138  <==    < from: CT Abdomen and Pelvis No Cont (03.04.22 @ 14:49) >  IMPRESSION:  Evaluation of the solid organs, vascular structures and GI tract is   limited without oral and IV contrast.  Bilateral lung nodules likely metastatic. 2 left lower lobe nodules are   cavitary. In light of cavitation, cannot exclude infection.  Right nephrostomy tube unchanged. No right hydronephrosis.  Reidentified near complete obliteration of the bladder lumen by irregular   soft tissue consistent with history of neoplasm.  Stable chronic severe left hydronephrosis with renal cortical atrophy.  Retroperitoneal adenopathy.  < end of copied text >    ___________________________________________________________________________________  ===============>>  A S S E S S M E N T   A N D   P L A N <<===============  ------------------------------------------------------------------------------------------    · Assessment	  Patient is a 56F, w/ PMHx of bradycardia (s/p pacemaker placement- [employer] Model Number - PM [id number], Serial number [id number], Implant 3/9/2010, most recent battery change 2/8/2022), asthma, myomectomy, bladder CA (on chemo Gemzar, Carboplatin, s/p nephrostomy tube on right kidney), Parathyroid tumor, HTN, and HLD, who came in with 5 day history of intermittent chest pain and low sugars. Admitted for hypoglycemia and chest pain.     Problem/Plan - 1:  ·  Problem: Hypoglycemia.   pt has been eating well here but FS have donnie low / low normal at times   Monitor glucose as above   endo follow up and mgmt appreciated / discusses : further workup in process   diet as soon as possible as pt with severe protein calorie malnutrition   nutritionist evaluated  supplements on   calorie count ordered      Problem/Plan - 2:  ·  Problem: chronic pain      ? related to metastatic disease ? constipation ?        pain mgmt as ordered          continue supportive care / pain mgmt          bowel regimen         monitor     Problem/Plan - 3:  ·  Problem: UTI treated   finished Cefpodoxime until 3/4.  post nephrostomy change  with some blood tinged urine      monitor      Problem/Plan - 4:  ·  Problem: Bladder cancer with metastatic disease, including lung  Next chemo was scheduled for 3/8/22  nephrostomy tube needs to be changed  follow cat scan as above   CT scan as above ( pt already aware of findings for the most part, previously )   to follow up as OP     Problem/Plan - 5:  ·  Problem: Pacemaker.   ·  Plan: Secondary to symptomatic bradycardia  Pt has [employer] Model Number - PM [id number], Serial number [id number], Implant 3/9/2010  Last battery change 2/8/2022. > healing well      Problem/Plan - 6:  ·  Problem: Multiple lung nodules.   Follow up outpatient w/Dr. Hoyt.     likely related to changes post chemo      Problem/Plan - 7:  ·  Problem: Parathyroid tumor.   ·  Plan: Plan for surgical management in the future  Follows an outpatient endocrinologist.     Problem/Plan - 8:  ·  Problem: HLD (hyperlipidemia).   ·  Plan: Not on any meds at home  Continue supportive care.     Problem/Plan - 9:  ·  Problem: HTN (hypertension).   ·  Plan: Controlled  Not on any meds  Monitor BP.     Problem/Plan - 10:  ·  Problem: Prophylactic measure.   ·  Plan; DVT PPX: Lovenox.    --------------------------------------------  Case discussed with pt, cecilio..   Education given on findings and plan of care  ___________________________  H. NIEVES Ochoa.  Pager: [phone]

## 2022-03-14 NOTE — CHART NOTE - NSCHARTNOTEFT_GEN_A_CORE
Assessment:   56yFemalePatient is a 56y old  Female who presents with a chief complaint of Hypoglycemia and chest pain (14 Mar 2022 10:53). Pt visited. Pt Reports eating better now. Ate Good B  fast. Over the weekend also Pt ate  "Lot  Of food " Per Pt.   D/W RN who also  Reports Eating good. Pt  refusing appetite stimulant.  Labs Reviewed. Pt states she does not Like Ensure clear.       Factors impacting intake: [ ] none [ ] nausea  [ ] vomiting [ ] diarrhea [ ] constipation  [ ]chewing problems [ ] swallowing issues  [x ] other:     Diet Prescription: Diet, Regular:   No Beef  No Caffeine  No Dairy  No Pork  No Tomatoes  Supplement Feeding Modality:  Oral  Ensure Clear Cans or Servings Per Day:  1       Frequency:  Three Times a day (03-13-22 @ 11:06)    Intake: > 50 %     Current Weight:   % Weight Change    Pertinent Medications: MEDICATIONS  (STANDING):  cholecalciferol 1000 Unit(s) Oral daily  enoxaparin Injectable 40 milliGRAM(s) SubCutaneous every 24 hours  lactated ringers. 500 milliLiter(s) (75 mL/Hr) IV Continuous <Continuous>  polyethylene glycol 3350 17 Gram(s) Oral daily  senna 2 Tablet(s) Oral at bedtime    MEDICATIONS  (PRN):  acetaminophen     Tablet .. 650 milliGRAM(s) Oral every 6 hours PRN Temp greater or equal to 38C (100.4F), Mild Pain (1 - 3), Moderate Pain (4 - 6)  bisacodyl Suppository 10 milliGRAM(s) Rectal daily PRN Constipation  melatonin 3 milliGRAM(s) Oral at bedtime PRN Insomnia  morphine  - Injectable 2 milliGRAM(s) IV Push every 3 hours PRN Severe Pain (7 - 10)  ondansetron Injectable 4 milliGRAM(s) IV Push every 8 hours PRN Nausea and/or Vomiting    Pertinent Labs: 03-14 Na137 mmol/L Glu 88 mg/dL K+ 4.0 mmol/L Cr  0.96 mg/dL BUN 18 mg/dL 03-14 Phos 2.9 mg/dL     CAPILLARY BLOOD GLUCOSE      POCT Blood Glucose.: 91 mg/dL (14 Mar 2022 11:39)  POCT Blood Glucose.: 90 mg/dL (14 Mar 2022 07:50)  POCT Blood Glucose.: 160 mg/dL (13 Mar 2022 21:59)  POCT Blood Glucose.: 109 mg/dL (13 Mar 2022 16:52)  POCT Blood Glucose.: 106 mg/dL (13 Mar 2022 13:18)  POCT Blood Glucose.: 64 mg/dL (13 Mar 2022 12:13)    Skin:     Estimated Needs:   [ ] no change since previous assessment  [ ] recalculated:     Previous Nutrition Diagnosis:   [ ] Inadequate Energy Intake [ ]Inadequate Oral Intake [ ] Excessive Energy Intake   [ ] Underweight [ ] Increased Nutrient Needs [ ] Overweight/Obesity   [ ] Altered GI Function [ ] Unintended Weight Loss [ ] Food & Nutrition Related Knowledge Deficit [x ] Malnutrition Severe    Nutrition Diagnosis is [ x] ongoing  [ ] resolved [ ] not applicable     New Nutrition Diagnosis: [ ] not applicable       Interventions:   Recommend  [ ] Change Diet To:  [ ] Nutrition Supplement  [ ] Nutrition Support  [x ] Other:  Provide food of choice.  (X) Encourage PO intake     Monitoring and Evaluation:   [ ] PO intake [ x ] Tolerance to diet prescription [ x ] weights [ x ] labs[ x ] follow up per protocol  [ ] other:

## 2022-03-14 NOTE — PROGRESS NOTE ADULT - NUTRITIONAL ASSESSMENT
This patient has been assessed with a concern for Malnutrition and has been determined to have a diagnosis/diagnoses of Severe protein-calorie malnutrition and Underweight (BMI < 19).    This patient is being managed with:   Diet NPO after Midnight-     NPO Start Date: 08-Mar-2022   NPO Start Time: 23:59  Entered: Mar  8 2022  2:45PM    Diet Regular-  No Beef  No Caffeine  No Dairy  No Pork  No Tomatoes  Entered: Mar  2 2022  3:40PM    
This patient has been assessed with a concern for Malnutrition and has been determined to have a diagnosis/diagnoses of Severe protein-calorie malnutrition and Underweight (BMI < 19).    This patient is being managed with:   Diet NPO-  Entered: Mar 10 2022  9:41AM    
This patient has been assessed with a concern for Malnutrition and has been determined to have a diagnosis/diagnoses of Severe protein-calorie malnutrition and Underweight (BMI < 19).    This patient is being managed with:   Diet Regular-  No Beef  No Caffeine  No Dairy  No Pork  No Tomatoes  Supplement Feeding Modality:  Oral  Ensure Clear Cans or Servings Per Day:  1       Frequency:  Three Times a day  Entered: Mar 11 2022  4:29PM    
This patient has been assessed with a concern for Malnutrition and has been determined to have a diagnosis/diagnoses of Severe protein-calorie malnutrition and Underweight (BMI < 19).    This patient is being managed with:   Diet Regular-  No Beef  No Caffeine  No Dairy  No Pork  No Tomatoes  Supplement Feeding Modality:  Oral  Ensure Clear Cans or Servings Per Day:  1       Frequency:  Three Times a day  Entered: Mar 13 2022 11:04AM    
This patient has been assessed with a concern for Malnutrition and has been determined to have a diagnosis/diagnoses of Severe protein-calorie malnutrition and Underweight (BMI < 19).    This patient is being managed with:   Diet NPO-  Entered: Mar 10 2022  9:41AM    
This patient has been assessed with a concern for Malnutrition and has been determined to have a diagnosis/diagnoses of Severe protein-calorie malnutrition and Underweight (BMI < 19).    This patient is being managed with:   Diet Regular-  No Beef  No Caffeine  No Dairy  No Pork  No Tomatoes  Supplement Feeding Modality:  Oral  Ensure Clear Cans or Servings Per Day:  1       Frequency:  Three Times a day  Entered: Mar 11 2022  4:29PM    
This patient has been assessed with a concern for Malnutrition and has been determined to have a diagnosis/diagnoses of Severe protein-calorie malnutrition and Underweight (BMI < 19).    This patient is being managed with:   Diet Regular-  No Beef  No Caffeine  No Dairy  No Pork  No Tomatoes  Supplement Feeding Modality:  Oral  Ensure Clear Cans or Servings Per Day:  1       Frequency:  Three Times a day  Entered: Mar 13 2022 11:04AM    
This patient has been assessed with a concern for Malnutrition and has been determined to have a diagnosis/diagnoses of Severe protein-calorie malnutrition and Underweight (BMI < 19).    This patient is being managed with:   Diet NPO after Midnight-     NPO Start Date: 08-Mar-2022   NPO Start Time: 23:59  Entered: Mar  8 2022  2:45PM    Diet Regular-  No Beef  No Caffeine  No Dairy  No Pork  No Tomatoes  Entered: Mar  2 2022  3:40PM    
This patient has been assessed with a concern for Malnutrition and has been determined to have a diagnosis/diagnoses of Severe protein-calorie malnutrition and Underweight (BMI < 19).    This patient is being managed with:   Diet Regular-  No Beef  No Caffeine  No Dairy  No Pork  No Tomatoes  Entered: Mar  2 2022  3:40PM    
This patient has been assessed with a concern for Malnutrition and has been determined to have a diagnosis/diagnoses of Severe protein-calorie malnutrition and Underweight (BMI < 19).    This patient is being managed with:   Diet Regular-  No Beef  No Caffeine  No Dairy  No Pork  No Tomatoes  Supplement Feeding Modality:  Oral  Ensure Clear Cans or Servings Per Day:  1       Frequency:  Three Times a day  Entered: Mar 11 2022  4:29PM    
This patient has been assessed with a concern for Malnutrition and has been determined to have a diagnosis/diagnoses of Severe protein-calorie malnutrition and Underweight (BMI < 19).    This patient is being managed with:   Diet Regular-  No Beef  No Caffeine  No Dairy  No Pork  No Tomatoes  Entered: Mar  2 2022  3:40PM    

## 2022-03-14 NOTE — PROGRESS NOTE ADULT - ASSESSMENT
M E D I C A L   A T T E N D I N G    F O L L O W    U P   N O T E  (03-14-22 )                                     ------------------------------------------------------------------------------------------------    patient evaluated by me, case discussed with team, chart, medications, and physical exam reviewed, labs / tests  and vitals reviewed by me, as bellow.   Patient is stable for discharge today.  Patient to follow up with  Oncology, endo, PMD /cardio..   See discharge document for full note.                             ( note written / Date of service  03-14-22 )    ==================>> MEDICATIONS <<====================    cholecalciferol 1000 Unit(s) Oral daily  enoxaparin Injectable 40 milliGRAM(s) SubCutaneous every 24 hours  lactated ringers. 500 milliLiter(s) IV Continuous <Continuous>  polyethylene glycol 3350 17 Gram(s) Oral daily  senna 2 Tablet(s) Oral at bedtime    MEDICATIONS  (PRN):  acetaminophen     Tablet .. 650 milliGRAM(s) Oral every 6 hours PRN Temp greater or equal to 38C (100.4F), Mild Pain (1 - 3), Moderate Pain (4 - 6)  bisacodyl Suppository 10 milliGRAM(s) Rectal daily PRN Constipation  melatonin 3 milliGRAM(s) Oral at bedtime PRN Insomnia  morphine  - Injectable 2 milliGRAM(s) IV Push every 3 hours PRN Severe Pain (7 - 10)  ondansetron Injectable 4 milliGRAM(s) IV Push every 8 hours PRN Nausea and/or Vomiting    ___________  Active diet:  Diet, Regular:   No Beef  No Caffeine  No Dairy  No Pork  No Tomatoes  Supplement Feeding Modality:  Oral  Ensure Clear Cans or Servings Per Day:  1       Frequency:  Three Times a day  ___________________    ==================>> VITAL SIGNS <<==================    T(C): 36.6 (03-14-22 @ 13:02), Max: 36.6 (03-14-22 @ 13:02)  HR: 82 (03-14-22 @ 13:02) (66 - 82)  BP: 106/62 (03-14-22 @ 13:02) (101/63 - 124/70)  BP(mean): --  RR: 18 (03-14-22 @ 13:02) (15 - 18)  SpO2: 99% (03-14-22 @ 13:02) (99% - 100%)     POCT Blood Glucose.: 91 mg/dL (14 Mar 2022 11:39)  POCT Blood Glucose.: 90 mg/dL (14 Mar 2022 07:50)  POCT Blood Glucose.: 160 mg/dL (13 Mar 2022 21:59)  POCT Blood Glucose.: 109 mg/dL (13 Mar 2022 16:52)    I&O's Summary    13 Mar 2022 07:01  -  14 Mar 2022 07:00  --------------------------------------------------------  IN: 0 mL / OUT: 525 mL / NET: -525 mL       ==================>> LAB AND IMAGING <<==================                        11.5   4.62  )-----------( 278      ( 14 Mar 2022 07:59 )             35.6        03-14    137  |  105  |  18  ----------------------------<  88  4.0   |  28  |  0.96    Ca    9.7      14 Mar 2022 07:59  Phos  2.9     03-14  Mg     2.0     03-14    TSH:      0.39   (01-16-22)           HgA1C:   (01-16-22)          (01-16-22)      5.5    WBC count:   4.62 <<== ,  5.02 <<== ,  5.56 <<== ,  4.95 <<== ,  10.00 <<==   Hemoglobin:   11.5 <<==,  13.9 <<==,  12.3 <<==,  11.1 <<==,  11.0 <<==  platelets:  278 <==, 270 <==, 252 <==, 218 <==, 229 <==, 249 <==    Creatinine:  0.96  <<==, 1.15  <<==, 1.17  <<==, 1.10  <<==, 1.09  <<==, 1.01  <<==  Sodium:   137  <==, 136  <==, 135  <==, 137  <==, 138  <==, 138  <==

## 2022-03-14 NOTE — DISCHARGE NOTE PROVIDER - NSDCDCMDCOMP_GEN_ALL_CORE
1) MRI Cspine shows no evidence of metastatic disease. There are no areas of abnormal enhancement. There is evidence of degenerative changes at multiple levels.  2) PT     Spoke with patient, patient' daughter, and patient's granddaughter. This document is complete and the patient is ready for discharge.

## 2022-03-14 NOTE — DISCHARGE NOTE PROVIDER - CARE PROVIDER_API CALL
Priscila Rosario  CARDIOVASCULAR DISEASE  [address] [facility], [address]  [place] [address]  Phone: [phone]  Fax: [phone]  Follow Up Time: 1 week    Jumana Garcia)  EndocrinologyMetabDiabetes  [address] [place] [id number]  Phone: [phone]  Fax: [phone]  Follow Up Time: 1 week

## 2022-03-14 NOTE — PROGRESS NOTE ADULT - REASON FOR ADMISSION
Hypoglycemia and chest pain

## 2022-03-14 NOTE — DISCHARGE NOTE NURSING/CASE MANAGEMENT/SOCIAL WORK - PATIENT PORTAL LINK FT
You can access the FollowMyHealth Patient Portal offered by [facility] by registering at the following website: [email or web address]. By joining [employer] [facility]’s FollowMyHealth portal, you will also be able to view your health information using other applications (apps) compatible with our system.

## 2022-03-14 NOTE — PROGRESS NOTE ADULT - SUBJECTIVE AND OBJECTIVE BOX
Interval Events:  Patient did have Blood glucose in 60-64, otherwise no acute events noted overnight.    Allergies    No Known Allergies    Intolerances      Endocrine/Metabolic Medications:      Vital Signs Last 24 Hrs  T(C): 36.1 (14 Mar 2022 05:27), Max: 36.4 (13 Mar 2022 14:16)  T(F): 97 (14 Mar 2022 05:27), Max: 97.6 (13 Mar 2022 14:16)  HR: 68 (14 Mar 2022 05:27) (66 - 74)  BP: 124/70 (14 Mar 2022 05:27) (101/63 - 124/70)  BP(mean): --  RR: 17 (14 Mar 2022 05:27) (15 - 17)  SpO2: 100% (14 Mar 2022 05:27) (100% - 100%)      PHYSICAL EXAM  All physical exam findings normal, except those marked:  General:	Alert, active, cooperative, NAD, well hydrated  .		[] Abnormal:  Neck		Normal: supple, no cervical adenopathy, no palpable thyroid  .		[] Abnormal:  Cardiovascular	Normal: regular rate, normal S1, S2, no murmurs  .		[] Abnormal:  Respiratory	Normal: no chest wall deformity, normal respiratory pattern, CTA B/L  .		[] Abnormal:  Abdominal	Normal: soft, ND, NT, bowel sounds present, no masses, no organomegaly  .		[] Abnormal:  		Normal normal genitalia, testes descended, circumcised/uncircumcised  .		Gilma stage:			Breast gilma:  .		Menstrual history:  .		[] Abnormal:  Extremities	Normal: FROM x4  .		[] Abnormal:  Skin		Normal: intact and not indurated, no rash, no acanthosis nigricans  .		[] Abnormal:  Neurologic	Normal: grossly intact  .		[] Abnormal:    LABS                        11.5   4.62  )-----------( 278      ( 14 Mar 2022 07:59 )             35.6                               137    |  105    |  18                  Calcium: 9.7   / iCa: x      (03-14 @ 07:59)    ----------------------------<  88        Magnesium: 2.0                              4.0     |  28     |  0.96             Phosphorous: 2.9        CAPILLARY BLOOD GLUCOSE      POCT Blood Glucose.: 90 mg/dL (14 Mar 2022 07:50)  POCT Blood Glucose.: 160 mg/dL (13 Mar 2022 21:59)  POCT Blood Glucose.: 109 mg/dL (13 Mar 2022 16:52)  POCT Blood Glucose.: 106 mg/dL (13 Mar 2022 13:18)  POCT Blood Glucose.: 64 mg/dL (13 Mar 2022 12:13)        Assesment/plan  Patient is a 56F, w/ PMHx of bradycardia (s/p pacemaker placement- [employer] Model Number - PM 2210, Serial number [id number], Implant 3/9/2010, most recent battery change 2/8/2022), asthma, myomectomy, bladder CA (on chemo Gemzar, Carboplatin, s/p nephrostomy tube on right kidney), Parathyroid tumor, HTN, and HLD, who came in with 5 day history of intermittent chest pain and low sugars.   Found to have hypoglycemia. Pt admits to having hypoglycemia frequently into 50s for many months- States she is eating well. However restricts carbs!     Problem/Recommendation - 1:  ·  Problem: Hypoglycemia.   ·  Recommendation: likely due to poor po intake  insulin and c- peptide low normal   d/w pt likley cause due to poor oral intake and maybe due to malignancy  igf-1 WNL and  insulin antibodies- pending  d/w pt at length regarding the plan  and with prim team and nutritionist.  monitor fsg   consider appetite stimulants  calorie count and bedtime snack, please document calorie count   will repeat 8AM cortisol levels for morning      Interval Events:  Patient did have Blood glucose in 60-64, otherwise no acute events noted overnight.  admits to not eating well    Allergies    No Known Allergies    Intolerances      Endocrine/Metabolic Medications:      Vital Signs Last 24 Hrs  T(C): 36.1 (14 Mar 2022 05:27), Max: 36.4 (13 Mar 2022 14:16)  T(F): 97 (14 Mar 2022 05:27), Max: 97.6 (13 Mar 2022 14:16)  HR: 68 (14 Mar 2022 05:27) (66 - 74)  BP: 124/70 (14 Mar 2022 05:27) (101/63 - 124/70)  BP(mean): --  RR: 17 (14 Mar 2022 05:27) (15 - 17)  SpO2: 100% (14 Mar 2022 05:27) (100% - 100%)      PHYSICAL EXAM  All physical exam findings normal, except those marked:  General:	Alert, active, cooperative, NAD, well hydrated  .		[] Abnormal:  Neck		Normal: supple, no cervical adenopathy, no palpable thyroid  .		[] Abnormal:  Cardiovascular	Normal: regular rate, normal S1, S2, no murmurs  .		[] Abnormal:  Respiratory	Normal: no chest wall deformity, normal respiratory pattern, CTA B/L  .		[] Abnormal:  Abdominal	Normal: soft, ND, NT, bowel sounds present, no masses, no organomegaly  .		[] Abnormal:  		Normal normal genitalia, testes descended, circumcised/uncircumcised  .		Gilma stage:			Breast gilma:  .		Menstrual history:  .		[] Abnormal:  Extremities	Normal: FROM x4  .		[] Abnormal:  Skin		Normal: intact and not indurated, no rash, no acanthosis nigricans  .		[] Abnormal:  Neurologic	Normal: grossly intact  .		[] Abnormal:    LABS                        11.5   4.62  )-----------( 278      ( 14 Mar 2022 07:59 )             35.6                               137    |  105    |  18                  Calcium: 9.7   / iCa: x      (03-14 @ 07:59)    ----------------------------<  88        Magnesium: 2.0                              4.0     |  28     |  0.96             Phosphorous: 2.9        CAPILLARY BLOOD GLUCOSE      POCT Blood Glucose.: 90 mg/dL (14 Mar 2022 07:50)  POCT Blood Glucose.: 160 mg/dL (13 Mar 2022 21:59)  POCT Blood Glucose.: 109 mg/dL (13 Mar 2022 16:52)  POCT Blood Glucose.: 106 mg/dL (13 Mar 2022 13:18)  POCT Blood Glucose.: 64 mg/dL (13 Mar 2022 12:13)        Assesment/plan  Patient is a 56F, w/ PMHx of bradycardia (s/p pacemaker placement- [employer] Model Number - PM 2210, Serial number [id number], Implant 3/9/2010, most recent battery change 2/8/2022), asthma, myomectomy, bladder CA (on chemo Gemzar, Carboplatin, s/p nephrostomy tube on right kidney), Parathyroid tumor, HTN, and HLD, who came in with 5 day history of intermittent chest pain and low sugars.   Found to have hypoglycemia. Pt admits to having hypoglycemia frequently into 50s for many months- States she is eating well. However restricts carbs!     Problem/Recommendation - 1:  ·  Problem: Hypoglycemia.   ·  Recommendation: likely due to poor po intake  insulin and c- peptide low normal   d/w pt likley cause due to poor oral intake and ? due to malignancy  igf-1 WNL and  insulin antibodies- pending  d/w pt at length regarding the plan  and with prim team and nutritionist.  monitor fsg   consider appetite stimulants  calorie count and bedtime snack, please document calorie count

## 2022-03-14 NOTE — DISCHARGE NOTE PROVIDER - NSDCMRMEDTOKEN_GEN_ALL_CORE_FT
cefpodoxime 100 mg oral tablet: 1 tab(s) orally every 12 hours   Vitamin D3 25 mcg (1000 intl units) oral tablet: 1 tab(s) orally once a day   acetaminophen 325 mg oral tablet: 2 tab(s) orally every 6 hours, As needed, Temp greater or equal to 38C (100.4F), Mild Pain (1 - 3), Moderate Pain (4 - 6)  Vitamin D3 25 mcg (1000 intl units) oral tablet: 1 tab(s) orally once a day

## 2022-03-14 NOTE — DISCHARGE NOTE NURSING/CASE MANAGEMENT/SOCIAL WORK - NSDCPEFALRISK_GEN_ALL_CORE
For information on Fall & Injury Prevention, visit: [email or web address] OR  [email or web address] OR  https://www.cdc.gov/steadi/patient.html

## 2022-03-14 NOTE — PROGRESS NOTE ADULT - PROVIDER SPECIALTY LIST ADULT
Internal Medicine
Pulmonology
Cardiology
Endocrinology
Internal Medicine
Pulmonology
Endocrinology
Internal Medicine
Pulmonology
Pulmonology
Endocrinology
Internal Medicine

## 2022-03-14 NOTE — DISCHARGE NOTE PROVIDER - HOSPITAL COURSE
56F, w/ PMHx of bradycardia (s/p pacemaker placement- [employer] Model Number - PM 2210, Serial number [id number], Implant 3/9/2010, most recent battery change 2/8/2022), asthma, myomectomy, bladder CA   (on chemo Gemzar, Carboplatin, s/p nephrostomy tube on right kidney), Parathyroid tumor, HTN, and HLD, who came in with 5 day history of intermittent chest pain and low sugars. Admitted for hypoglycemia, cardiology and endo consulted. Initially monitored on tele then transferred to medicine. Incidental finding on Chest Xray with suspicion for lung metastasis. CT chest abd and pelvis confirms   Right nephrostomy tube exchanged by IR. Hospital course complicated with finding of Hyperinsulinemia, Endo followed who recc'd calorie count and bedtime snack   Given clinical course decision made to discharge patient home without patient follow up\  Discharge discussed with attending   This is only a brief summary of patient's hospital stay, for full course please see EMR

## 2022-03-14 NOTE — DISCHARGE NOTE PROVIDER - PROVIDER TOKENS
PROVIDER:[TOKEN:[[id number]],FOLLOWUP:[1 week]],PROVIDER:[TOKEN:[[id number]],FOLLOWUP:[1 week]]

## 2022-03-14 NOTE — DISCHARGE NOTE PROVIDER - DETAILS OF MALNUTRITION DIAGNOSIS/DIAGNOSES
This patient has been assessed with a concern for Malnutrition and was treated during this hospitalization for the following Nutrition diagnosis/diagnoses:     -  03/04/2022: Severe protein-calorie malnutrition   -  03/04/2022: Underweight (BMI < 19)

## 2022-03-14 NOTE — DISCHARGE NOTE PROVIDER - NSDCCAREPROVSEEN_GEN_ALL_CORE_FT
Gabriela, Candelario Garcia, Jumana Sewell, Mikael Medrano, Bhavik Candelario Baumann  [facility]  Team [facility] ACP - NP Service

## 2022-03-14 NOTE — PROGRESS NOTE ADULT - SUBJECTIVE AND OBJECTIVE BOX
Time of Visit:  Patient seen and examined.     MEDICATIONS  (STANDING):  cholecalciferol 1000 Unit(s) Oral daily  enoxaparin Injectable 40 milliGRAM(s) SubCutaneous every 24 hours  lactated ringers. 500 milliLiter(s) (75 mL/Hr) IV Continuous <Continuous>  polyethylene glycol 3350 17 Gram(s) Oral daily  senna 2 Tablet(s) Oral at bedtime      MEDICATIONS  (PRN):  acetaminophen     Tablet .. 650 milliGRAM(s) Oral every 6 hours PRN Temp greater or equal to 38C (100.4F), Mild Pain (1 - 3), Moderate Pain (4 - 6)  bisacodyl Suppository 10 milliGRAM(s) Rectal daily PRN Constipation  melatonin 3 milliGRAM(s) Oral at bedtime PRN Insomnia  morphine  - Injectable 2 milliGRAM(s) IV Push every 3 hours PRN Severe Pain (7 - 10)  ondansetron Injectable 4 milliGRAM(s) IV Push every 8 hours PRN Nausea and/or Vomiting       Medications up to date at time of exam.    ROS; No fever, chills, cough, congestion on exam. Denies SOB .   PHYSICAL EXAMINATION:  Vital Signs Last 24 Hrs  T(C): 36.1 (14 Mar 2022 05:27), Max: 36.4 (13 Mar 2022 14:16)  T(F): 97 (14 Mar 2022 05:27), Max: 97.6 (13 Mar 2022 14:16)  HR: 68 (14 Mar 2022 05:27) (66 - 74)  BP: 124/70 (14 Mar 2022 05:27) (101/63 - 124/70)  BP(mean): --  RR: 17 (14 Mar 2022 05:27) (15 - 17)  SpO2: 100% (14 Mar 2022 05:27) (100% - 100%)   (if applicable)    General: Alert and oriented. Able to answer question with no SOB. No acute distress.     HEENT: Head is normocephalic and atraumatic. No nasal tenderness. Extraocular muscles are intact. Mucous membranes are moist.     NECK: Supple, no palpable adenopathy.    LUNGS: Clear to auscultation bilaterally with no wheezing, rales, or rhonchi. No use of accessory muscle.     HEART: S1 S2 Regular rate and no click/ rub.     ABDOMEN: Soft, nontender, and nondistended.  Active bowel sounds.     : No bladder distention and tenderness. Rt side Nephrostomy tube with dry abdominal dressing.     EXTREMITIES: Without any cyanosis, clubbing, rash, lesions or edema.    NEUROLOGIC: Awake, alert, oriented.     SKIN: Warm and moist . Non diaphoretic.       LABS:                        11.5   4.62  )-----------( 278      ( 14 Mar 2022 07:59 )             35.6     03-14    137  |  105  |  18  ----------------------------<  88  4.0   |  28  |  0.96    Ca    9.7      14 Mar 2022 07:59  Phos  2.9     03-14  Mg     2.0     03-14      MICROBIOLOGY: (if applicable)    RADIOLOGY & ADDITIONAL STUDIES:  EKG:   CXR:  ECHO:    IMPRESSION: 56y Female PAST MEDICAL & SURGICAL HISTORY:  Anemia    Asthma    HLD (hyperlipidemia)    Pacemaker    Bladder cancer    HTN (hypertension)    Parathyroid tumor    Liver cyst    Hydronephrosis  has right Nephrostomy tube - dressing intact    Bradycardia    H/O myomectomy    Presence of cardiac pacemaker    H/O hydronephrosis  s/p Right Perc nephrostomy tube placement 02/2021, exchange 06/2021    Impression: This is a 57 Y/O Female presented with a 5 days of intermittent chest pain and Hypoglycemia. Has Asthma but no exacerbation on exam. Has Myomectomy, Bladder CA ( On chemo Gemzar, Carboplatin, s/p nephrostomy tube on right kidney), Parathyroid tumor. Patient noted on CT Chest with multiple B/L Lung Nodules most likely due to Metastatic Disease. 03-08-22,03-02-22 Negative PCR for Covid 19.      Suggestion:  -O2 saturation 100% room air. So far been saturating good room air.  No need for Oxygen supplementation outpatient.   -[facility] planning to [facility] .   - Nephrostomy tube care.    - F/U as out pat with Dr Hoyt ( onco)   - DVT/ GI prophylactic. On Lovenox 40 mg SQ daily.

## 2022-03-14 NOTE — DISCHARGE NOTE PROVIDER - NSDCCPCAREPLAN_GEN_ALL_CORE_FT
PRINCIPAL DISCHARGE DIAGNOSIS  Diagnosis: Hypoglycemia  Assessment and Plan of Treatment: You presented to the hospital with complaints of low blood surgar. You were found to have hypoinsulinemia. This means the amount of insulin in your blood is higher than what's considered normal. Alone, it isn't diabetes. But hyperinsulinemia is often associated with type 2 diabetes.  Insulin is a hormone that's normally produced by your pancreas, which helps regulate blood sugar. Hyperinsulinemia is a sign of an underlying problem.  Hyperinsulinemia is most often caused by insulin resistance — a condition in which your body doesn't respond well to the effects of insulin. Your pancreas tries to compensate by making more insulin. Insulin resistance may eventually lead to the development of type 2 diabetes. This happens when your pancreas is no longer able to compensate by secreting the large amounts of insulin required to keep the blood sugar normal. Continue monitoring your blood sugars and calorie count + readily available snacks at all time. Follow up with your endocrinology      SECONDARY DISCHARGE DIAGNOSES  Diagnosis: Multiple lung nodules  Assessment and Plan of Treatment: You were incedentily found to have bilateral lung nodules. This should be followed up by your oncologist outpatient as this appears metastatic diease and will need further workup    Diagnosis: Bladder cancer  Assessment and Plan of Treatment: continue the reccomendations of your oncologist outpatient

## 2022-03-14 NOTE — DISCHARGE NOTE PROVIDER - YES NO FOR MLM POSITIVE OR NEGATIVE COVID RESULT
, Wanda Camacho (LILA), Internal Medicine  [address] [place] [id number]  Phone: [phone]  Fax: [phone]    Fatuma Alva), Hematology; Medical Oncology  [address] [facility]  [address] [place] [address]  Phone: [phone]  Fax: [phone]

## 2022-03-23 NOTE — ED PROVIDER NOTE - NSFOLLOWUPINSTRUCTIONS_ED_ALL_ED_FT
[facility] Center[facility]ueseRussianSpanishTagalog                                                                                               Hematuria, Adult       Hematuria is blood in the urine. Blood may be visible in the urine, or it may be identified with a test. This condition can be caused by infections of the bladder, urethra, kidney, or prostate. Other possible causes include:  •Kidney stones.      •Cancer of the urinary tract.      •Too much calcium in the urine.      •Conditions that are passed from parent to child (inherited conditions).       •Exercise that requires a lot of energy.      Infections can usually be treated with medicine, and a kidney stone usually will pass through your urine. If neither of these is the cause of your hematuria, more tests may be needed to identify the cause of your symptoms.    It is very important to tell your health care provider about any blood in your urine, even if it is painless or the blood stops without treatment. Blood in the urine, when it happens and then stops and then happens again, can be a symptom of a very serious condition, including cancer. There is no pain in the initial stages of many urinary cancers.      Follow these instructions at home:    Medicines     •Take over-the-counter and prescription medicines only as told by your health care provider.      •If you were prescribed an antibiotic medicine, take it as told by your health care provider. Do not stop taking the antibiotic even if you start to feel better.      Eating and drinking     •Drink enough fluid to keep your urine pale yellow. It is recommended that you drink 3–4 quarts (2.8–3.8 L) a day. If you have been diagnosed with an infection, drinking cranberry juice in addition to large amounts of water is recommended.      •Avoid caffeine, tea, and carbonated beverages. These tend to irritate the bladder.      •Avoid alcohol because it may irritate the prostate (in males).      General instructions     •If you have been diagnosed with a kidney stone, follow your health care provider's instructions about straining your urine to catch the stone.      •Empty your bladder often. Avoid holding urine for long periods of time.    •If you are female:  •After a bowel movement, wipe from front to back and use each piece of toilet paper only once.      •Empty your bladder before and after sex.        •Pay attention to any changes in your symptoms. Tell your health care provider about any changes or any new symptoms.      •It is up to you to get the results of any tests. Ask your health care provider, or the department that is doing the test, when your results will be ready.      •Keep all follow-up visits. This is important.        Contact a health care provider if:    •You develop back pain.      •You have a fever or chills.      •You have nausea or vomiting.      •Your symptoms do not improve after 3 days.      •Your symptoms get worse.        Get help right away if:    •You develop severe vomiting and are unable to take medicine without vomiting.      •You develop severe pain in your back or abdomen even though you are taking medicine.      •You pass a large amount of blood in your urine.      •You pass blood clots in your urine.      •You feel very weak or like you might faint.      •You faint.        Summary    •Hematuria is blood in the urine. It has many possible causes.      •It is very important that you tell your health care provider about any blood in your urine, even if it is painless or the blood stops without treatment.      •Take over-the-counter and prescription medicines only as told by your health care provider.      •Drink enough fluid to keep your urine pale yellow.      This information is not intended to replace advice given to you by your health care provider. Make sure you discuss any questions you have with your health care provider.      Document Revised: 08/18/2021 Document Reviewed: 08/18/2021    Elsevier Patient Education © 2022 [employer] Inc[employer]

## 2022-03-23 NOTE — ED PROVIDER NOTE - PATIENT PORTAL LINK FT
You can access the FollowMyHealth Patient Portal offered by [facility] by registering at the following website: [email or web address]. By joining [employer]’s FollowMyHealth portal, you will also be able to view your health information using other applications (apps) compatible with our system.

## 2022-03-23 NOTE — ED PROVIDER NOTE - CLINICAL SUMMARY MEDICAL DECISION MAKING FREE TEXT BOX
Patient with active bladder cancer presenting with back pain, LUQ pain, and hematuria. Will check labs, CTAP, reassess.

## 2022-03-23 NOTE — ED PROVIDER NOTE - PROGRESS NOTE DETAILS
Patient wants only tylenol for pain. showed me urine sample - gross blood. Patient signed out to Dr. Johnson. Labs and CT pending. Likely admission. Dr. Ang came to evaluate patient, recommends dc home if CT negative as patient H/H stable.  CT pending, will continue to observe. Terra: ct negative for acute abnormality. UA with +blood but no uti. pt to be dc home. f/u with PMD/onc/urology. return precautions discussed.

## 2022-03-23 NOTE — ED PROVIDER NOTE - GASTROINTESTINAL, MLM
Abdomen soft, non-tender, non-distended. Normal bowel sounds. No guarding or rebound. Right nephrostomy tube with clear yellow urine in bag.

## 2022-03-23 NOTE — ED ADULT NURSE NOTE - NSICDXPASTSURGICALHX_GEN_ALL_CORE_FT
Routing refill request to provider for review/approval because:  Drug not on the [facility] refill protocol   Last ordered by Cathy Cordon PA-C Cindy F. RN, BSN            
PAST SURGICAL HISTORY:  H/O hydronephrosis s/p Right Perc nephrostomy tube placement 02/2021, exchange 06/2021    H/O myomectomy     Presence of cardiac pacemaker

## 2022-03-23 NOTE — ED PROVIDER NOTE - OBJECTIVE STATEMENT
Patient reports hematuria and back pain for 1 week. Has h/o bladder cancer, on chemo. Right nephrostomy tube for 1 year, last changed by IR 2 weeks ago. Patient reports hematuria is only from urine from bladder, not from nephrostomy. Patient discharged from this hospital 3/14 after a 12 day stay, during which, she had PPM battery changed, nephrostomy changed, and was evaluated by endocrinology for hyperinsulinemia. Their recommendation was frequent meals. Patient reports she was having slight hematuria while in the hospital, but a few days after discharge, hematuria worsened, with worsening back pain. Feels pain in left lower chest and LUQ area while urinating. Also intermittent nausea. No fever, cough, sob, ap, v/d.

## 2022-03-23 NOTE — ED ADULT TRIAGE NOTE - WEIGHT IN KG
Daily Note     Today's date: 2019  Patient name: Manjit Holley  : 1942  MRN: [id number]  Referring provider: Severa Mari, DPM  Dx:   Encounter Diagnosis     ICD-10-CM    1  Chronic pain of right ankle M25 571     G89 29                   Subjective: Patient reports decreased pain with walking 1/10      Objective: See treatment diary below      Assessment: Tolerated treatment well  Patient had pain with SL balance on foam   VC's and difficulty maintaining forward foot position with side stepping  Patient had cavitation with calcaneal tilt and then increased foot ROM  Plan: Continue PT     Precautions: Osteoporosis      Manual             PROM 10' 10'                                                                   Exercise Diary            HEP stretching 30            bike   10          CS/HR  10 10          Step ups fwd, side  Fwd 20 20xea          Circuit legs  20           Gr disc             Foam tandem  SL 3' SL 3'          bosu lunges   10 5sec          Side stepping   3x          Vector 5             TM gt   tr  10'                                                                                                                                    Modalities              US             Heat/stim             heat 5' 43.6

## 2022-05-04 NOTE — H&P ADULT - PROBLEM SELECTOR PLAN 5
Hx of asthma  home med - symbicort  lung exam - normal  CXR - pulmoparenchymal nodularity (metastatic disease)  resume symbicort  added albuterol prn

## 2022-05-04 NOTE — ED PROVIDER NOTE - OBJECTIVE STATEMENT
patient reports 2 days of pleuritic, right-sided chest pain with DUQUE. No fever, cough, nausea, diaphoresis. Patient has nephrostomy tube due to large clot in bladder. Only has one kidney

## 2022-05-04 NOTE — H&P ADULT - PROBLEM SELECTOR PLAN 6
Hx of bladder cancer  on chemo as outpatient (Gemzar, Caboplatin)  patient unsure of dosage or regimen  has hematuria and blood clots  f/u UA, UCx  Heme-Onc ([facility]) in AM Hx of bladder cancer  on chemo as outpatient (Gemzar, Caboplatin)  patient unsure of dosage or regimen  has hematuria and blood clots  f/u UA, UCx

## 2022-05-04 NOTE — ED PROVIDER NOTE - PROGRESS NOTE DETAILS
I recommended admission for VQ scan as patient has only one kidney. Patient wants to check d-dimer first. If elevated, will agree to admission. Patient endorsed to Dr. Ochoa.

## 2022-05-04 NOTE — PATIENT PROFILE ADULT - NSPROPTRIGHTNOTIFY_GEN_A_NUR
[FreeTextEntry1] : Spirometry showed suggestive evidence of mild restrictive defect with some improvement postbronchodilator
declines

## 2022-05-04 NOTE — ED ADULT TRIAGE NOTE - RESPIRATORY RATE (BREATHS/MIN)
16 If you are a smoker, it is important for your health to stop smoking. Please be aware that second hand smoke is also harmful.

## 2022-05-04 NOTE — PATIENT PROFILE ADULT - IS PATIENT POST-MENOPAUSAL?
----- Message from Chloe Montilla sent at 3/4/2022  8:41 AM EST -----  Subject: Message to Provider    QUESTIONS  Information for Provider? Mother wants to know if Giovanny Clark had the hepatitis   B shot. Please call to let her know.   ---------------------------------------------------------------------------  --------------  CALL BACK INFO  What is the best way for the office to contact you? OK to leave message on   voicemail  Preferred Call Back Phone Number? [phone]  ---------------------------------------------------------------------------  --------------  SCRIPT ANSWERS  Relationship to Patient? Parent  Representative Name? [place]   Patient is under 25 and the Parent has custody? Yes  Additional information verified (besides Name and Date of Birth)?  Address yes

## 2022-05-04 NOTE — H&P ADULT - HISTORY OF PRESENT ILLNESS
Patient is a 57 y/o F w/ Hx of anemia, asthma, bladder CA (on chemo Gemzar, Carboplatin), R nephrostomy tube, solitary kidney, HTN (not on meds), HLD (not on meds), PPM, parathyroid tumor. She came here after 2 days of sharp intermittent pleuritic chest pain. She was recently admitted to [facility] x 5 days last week for antibiotic treatment. She was given IV Zosyn, Levofloxacin and was discharged on home antibiotics (Cefepime as per patient but pharmacy says it's Cefpodoxime). She denies any fever, cough, shortness of breath, palpitation. She attributes her chest pain to the antibiotics affecting her kidneys. Of note, she was recently admitted from March 2 to 14, 2022 due to hypoglycemia and chest pain. She was diagnosed with Hyperinsulinemia. She was also recently seen in the ED on March 23, 2022 due to hematuria and back pain and was subsequently discharged. In the ED, D-dimer was mildly elevated, Troponins were negative x 2. She was eventually admitted for chest pain to r/o PE pending VQ scan.    GOC: FULL CODE

## 2022-05-04 NOTE — ED ADULT NURSE NOTE - OBJECTIVE STATEMENT
Pt. c/o chest pain with some shortness of breath that started yesterday. Pt. states she has lung cancer and was recently discharged from hospital for possible blood clot.

## 2022-05-04 NOTE — H&P ADULT - NSHPOUTPATIENTPROVIDERS_GEN_ALL_CORE
PCP / Cardio - Dr. Kody Rosario  Heme-Onc - Dr. Kurtis Hoyt  Endo - Dr. Amrik Meier  PPM - [facility]

## 2022-05-04 NOTE — H&P ADULT - NSHPADDITIONALINFOADULT_GEN_ALL_CORE
Patient evaluated, case discussed with me, chart reviewed, agree with above H/P as reviewed.  Dr. LUIS ANGEL Ochoa ([phone])

## 2022-05-04 NOTE — H&P ADULT - CONVERSATION DETAILS
- asked about GOC  - states that she assigned her daughter as HCP (Ms. Giovanna Bell)  - documents are at home  - she does not want to think abour DNR/DNI  - full code for now

## 2022-05-04 NOTE — H&P ADULT - PROBLEM SELECTOR PLAN 7
Hx of bradycardia  PPM placed ([facility])  EKG - NSR  Cardio consulted (Dr. Rosario) Hx of bradycardia  PPM placed ([facility])  EKG - NSR

## 2022-05-04 NOTE — H&P ADULT - PROBLEM SELECTOR PROBLEM 6
Normal vision: sees adequately in most situations; can see medication labels, newsprint Bladder cancer

## 2022-05-04 NOTE — H&P ADULT - NSHPPHYSICALEXAM_GEN_ALL_CORE
Vital Signs  · Temp - 98F (36.7C)  · Heart Rate - 75  · BP - 131/78  · Respiration Rate - 16  · SpO2 (%) - 100    PHYSICAL EXAM:  GENERAL: NAD, speaks in full sentences, no signs of respiratory distress  HEAD:  Atraumatic, Normocephalic  EYES: EOMI, PERRLA, conjunctiva and sclera clear  NECK: Supple, No JVD  CHEST/LUNG: Clear to auscultation bilaterally; No wheeze; No crackles; No accessory muscles used  HEART: Regular rate and rhythm; No murmurs;   ABDOMEN: Soft, Nontender, Nondistended; Bowel sounds present; No guarding; (+) R nephrostomy tube  EXTREMITIES:  2+ Peripheral Pulses, No cyanosis or edema  PSYCH: AAOx3  NEUROLOGY: non-focal  SKIN: No rashes or lesions

## 2022-05-04 NOTE — H&P ADULT - NSICDXPASTMEDICALHX_GEN_ALL_CORE_FT
PAST MEDICAL HISTORY:  Anemia     Asthma     Bladder cancer     Bradycardia     HLD (hyperlipidemia)     HTN (hypertension)     Hydronephrosis has right Nephrostomy tube - dressing intact    Liver cyst     Pacemaker [facility]    Parathyroid tumor

## 2022-05-04 NOTE — ED PROVIDER NOTE - GASTROINTESTINAL, MLM
Abdomen soft, non-tender, non-distended. Normal bowel sounds. No guarding or rebound.
Breath sounds clear and equal bilaterally.

## 2022-05-04 NOTE — H&P ADULT - ASSESSMENT
Patient is a 57 y/o F w/ Hx of anemia, asthma, bladder CA (on chemo Gemzar, Carboplatin), R nephrostomy tube, solitary kidney, HTN (not on meds), HLD (not on meds), PPM, parathyroid tumor. Admitted for chest pain to r/o PE pending VQ scan.

## 2022-05-04 NOTE — H&P ADULT - PROBLEM SELECTOR PLAN 1
p/w pleuritic chest pain  EKG - NSR  Trops x 2 neg (5.5>5.8)  D-dimer - 242 (mildly elevated)  CXR - pulmoparenchymal nodularity (metastatic disease)  f/u VQ Scan (Hx of solitary kidney)

## 2022-05-04 NOTE — PATIENT PROFILE ADULT - FALL HARM RISK - HARM RISK INTERVENTIONS
Communicate Risk of Fall with Harm to all staff/Sit up slowly, dangle for a short time, stand at bedside before walking/Tailored Fall Risk Interventions/Visual Cue: Yellow wristband and red socks/Bed in lowest position, wheels locked, appropriate side rails in place/Call bell, personal items and telephone in reach/Instruct patient to call for assistance before getting out of bed or chair/Non-slip footwear when patient is out of bed/[place] to call system/Physically safe environment - no spills, clutter or unnecessary equipment/Purposeful Proactive Rounding/Room/bathroom lighting operational, light cord in reach

## 2022-05-04 NOTE — H&P ADULT - NSHPREVIEWOFSYSTEMS_GEN_ALL_CORE
- CONSTITUTIONAL: Denies fever and chills  - HEENT: Denies changes in vision and hearing.  - RESPIRATORY: Denies SOB and cough.  - CV: (+) chest pain. Denies palpitations  - GI: Denies abdominal pain, nausea, vomiting and diarrhea.  - : Denies dysuria and urinary frequency.  - SKIN: Denies rash and pruritus.  - NEUROLOGICAL: Denies headache and syncope.  - PSYCHIATRIC: Denies recent changes in mood. Denies anxiety and depression.

## 2022-05-04 NOTE — H&P ADULT - PROBLEM SELECTOR PLAN 2
Hx of hematuria and blood clots in nephrostomy tube  urine still has hematuria and blood clots  Hgb/Hct - 11.1/34  hold anticoagulation  f/u UA, UCx

## 2022-05-05 NOTE — PROGRESS NOTE ADULT - PROBLEM SELECTOR PLAN 7
Hx of bladder cancer  on chemo as outpatient (Gemzar, Caboplatin)  patient unsure of dosage or regimen  has hematuria and blood clots  f/u UA, UCx

## 2022-05-05 NOTE — PROGRESS NOTE ADULT - SUBJECTIVE AND OBJECTIVE BOX
PGY-1 Progress Note discussed with attending    PAGER #: [[phone]] TILL 5:00 PM  PLEASE CONTACT ON CALL TEAM:  - On Call Team (Please refer to [place]) FROM 5:00 PM - 8:30PM  - Nightfloat Team FROM 8:30 -7:30 AM    CHIEF COMPLAINT & BRIEF HOSPITAL COURSE:  Patient is a 57 y/o F w/ Hx of anemia, asthma, bladder CA (on chemo Gemzar, Carboplatin), R nephrostomy tube, solitary kidney, HTN (not on meds), HLD (not on meds), PPM, parathyroid tumor. She came here after 2 days of sharp intermittent pleuritic chest pain. She was recently admitted to [facility] x 5 days last week for antibiotic treatment. She was given IV Zosyn, Levofloxacin and was discharged on home antibiotics (Cefepime as per patient but pharmacy says it's Cefpodoxime). She denies any fever, cough, shortness of breath, palpitation. She attributes her chest pain to the antibiotics affecting her kidneys. Of note, she was recently admitted from March 2 to 14, 2022 due to hypoglycemia and chest pain. She was diagnosed with Hyperinsulinemia. She was also recently seen in the ED on March 23, 2022 due to hematuria and back pain and was subsequently discharged    In the ED, D-dimer was mildly elevated, Troponins were negative x 2. She was admitted for chest pain to r/o PE pending VQ scan.    INTERVAL HPI/OVERNIGHT EVENTS:   Patient seen and examined at bedside. No overnight events. Tele showing Paced HR with no significant events. Pt complaining of SOB on exertion. Pending VQ scan today.     REVIEW OF SYSTEMS:  CONSTITUTIONAL: No fever, night sweats, weight loss, or fatigue  RESPIRATORY: No cough, wheezing, or hemoptysis; No shortness of breath  CARDIOVASCULAR: No chest pain, palpitations, dizziness, or leg swelling  GASTROINTESTINAL: No abdominal pain. No nausea, vomiting, or hematemesis; No diarrhea or constipation. No melena or hematochezia.  GENITOURINARY: No dysuria or hematuria, no urinary frequency  NEUROLOGICAL: No headaches, memory loss, loss of strength, numbness, or tremors  SKIN: No itching, burning, rashes, or lesions     Vital Signs Last 24 Hrs  T(C): 36.4 (05 May 2022 07:52), Max: 36.7 (04 May 2022 11:07)  T(F): 97.5 (05 May 2022 07:52), Max: 98.1 (04 May 2022 16:23)  HR: 59 (05 May 2022 07:52) (54 - 75)  BP: 138/75 (05 May 2022 07:52) (106/67 - 138/75)  BP(mean): --  RR: 19 (05 May 2022 07:52) (16 - 19)  SpO2: 100% (05 May 2022 07:52) (99% - 100%)    PHYSICAL EXAMINATION:  GENERAL: Cachectic, NAD, well built  HEAD:  Atraumatic, Normocephalic  EYES:  conjunctiva and sclera clear  NECK: Supple, No JVD, thyroid not examined   CHEST/LUNG: Clear to auscultation. No wheezing, rales, rubs or rhonchi  HEART: Regular rate and rhythm; Clear S1 and S2, No murmurs, rubs, or gallops  ABDOMEN: Soft, Nontender, Nondistended; Bowel sounds present, +nephrostomy tube in place, clear urine   NERVOUS SYSTEM:  Alert & Oriented X3, CNII-XII intact, no focal neurological deficits   EXTREMITIES:  2+ Peripheral Pulses, No clubbing, cyanosis, or edema  SKIN: warm dry, no lesions noted                           10.5   5.10  )-----------( 205      ( 05 May 2022 07:20 )             32.1     05-05    140  |  106  |  18  ----------------------------<  155<H>  3.7   |  25  |  0.97    Ca    10.0      05 May 2022 07:20  Phos  2.6     05-05  Mg     2.0     05-05    TPro  6.9  /  Alb  2.8<L>  /  TBili  0.2  /  DBili  x   /  AST  14  /  ALT  13  /  AlkPhos  84  05-05    LIVER FUNCTIONS - ( 05 May 2022 07:20 )  Alb: 2.8 g/dL / Pro: 6.9 g/dL / ALK PHOS: 84 U/L / ALT: 13 U/L DA / AST: 14 U/L / GGT: x               PT/INR - ( 05 May 2022 07:20 )   PT: 13.1 sec;   INR: 1.10 ratio         PTT - ( 05 May 2022 07:20 )  PTT:28.6 sec    CAPILLARY BLOOD GLUCOSE    MEDICATIONS  (STANDING):  budesonide  80 MICROgram(s)/formoterol 4.5 MICROgram(s) Inhaler 2 Puff(s) Inhalation two times a day  cefTRIAXone   IVPB 1000 milliGRAM(s) IV Intermittent every 24 hours    MEDICATIONS  (PRN):  acetaminophen     Tablet .. 650 milliGRAM(s) Oral every 4 hours PRN Temp greater or equal to 38C (100.4F), Mild Pain (1 - 3), Moderate Pain (4 - 6)  ALBUTerol    90 MICROgram(s) HFA Inhaler 2 Puff(s) Inhalation every 6 hours PRN Shortness of Breath and/or Wheezing      RADIOLOGY & ADDITIONAL TESTS:      ACC: [id number] EXAM:  XR CHEST PORTABLE URGENT 1V                          PROCEDURE DATE:  05/04/2022          INTERPRETATION:  Chest pain.    AP chest. Prior 3/2/2022.    Left ICD. No change heart mediastinum. Bilateral parenchymal nodules some   with cavitation similar to prior suggestive of metastatic disease. No   acute pleural-parenchymal abnormality.    IMPRESSION: Pulmonary parenchymal nodularity suggestive of metastatic   disease. No acute infiltrate.    --- End of Report ---      GERA PIKE MD; Attending Radiologist  This document has been electronically signed. May  4 2022  2:20PM

## 2022-05-06 NOTE — DIETITIAN INITIAL EVALUATION ADULT - NUTRITIONGOAL OUTCOME1
Tolerate diet, max Po intake, maintain body weight, meet nutritional needs. Tolerate diet, max Po intake, meet nutritional needs.

## 2022-05-06 NOTE — DISCHARGE NOTE PROVIDER - CARE PROVIDER_API CALL
Priscila Rosario  CARDIOVASCULAR DISEASE  [address], [address]  [place] [address]  Phone: [phone]  Fax: [phone]  Follow Up Time:

## 2022-05-06 NOTE — DISCHARGE NOTE PROVIDER - NSDCFUSCHEDAPPT_GEN_ALL_CORE_FT
Renay Trinidad  [facility] Physician [facility]  Urology [address]  Scheduled Appointment: 05/09/2022

## 2022-05-06 NOTE — DISCHARGE NOTE PROVIDER - DETAILS OF MALNUTRITION DIAGNOSIS/DIAGNOSES
This patient has been assessed with a concern for Malnutrition and was treated during this hospitalization for the following Nutrition diagnosis/diagnoses:     -  05/06/2022: Severe protein-calorie malnutrition   -  05/06/2022: Underweight (BMI < 19)

## 2022-05-06 NOTE — DIETITIAN INITIAL EVALUATION ADULT - SIGNS/SYMPTOMS
Low BMI 16.1,severe muscle/fat body loss,  cachectic Low BMI 16.1,severe muscle/fat body loss,  cachectic, with hx. wt loss 5%x4M,

## 2022-05-06 NOTE — DIETITIAN INITIAL EVALUATION ADULT - NSFNSGIIOFT_GEN_A_CORE
IBW: 120#  Previous weights: 1/15/22 99 lbs--->> 3/4/22: 91 lbs-->> 5/5/22: 101.1 lbs-->> 5/6/22: 102.5 lbs. Pt was re-weight today CBW 94 lbs. Flactuation in wt noted. As per Pt UBW 90 lbs.

## 2022-05-06 NOTE — DIETITIAN INITIAL EVALUATION ADULT - ADD RECOMMEND
Severe malnutrition identified. Severe malnutrition identified. Pt refused nutritional supplements, Double portion with meals provided as per pt request.

## 2022-05-06 NOTE — DISCHARGE NOTE PROVIDER - NSDCCPCAREPLAN_GEN_ALL_CORE_FT
PRINCIPAL DISCHARGE DIAGNOSIS  Diagnosis: Multiple pulmonary nodules  Assessment and Plan of Treatment: You came in complaining of chest pain. EKG and cardiac enzymes were normal. a chest X-ray showed nodularity which may be suspicious for metastatic disease. VQ scan was negative for pulmonary embolism. Please continue with your chemo regimen and follow up with your oncologist as an outpatietn for possible PET-CT scan for further evaluation of these nodules.      SECONDARY DISCHARGE DIAGNOSES  Diagnosis: UTI (urinary tract infection)  Assessment and Plan of Treatment: A urinalysis was positive but a urine culture was negative. You were treated with a IV antibiotics but no longer require them. Please continue to get your nephrostomy tube repalced as an outpatient    Diagnosis: Bladder cancer  Assessment and Plan of Treatment: You have a history of bladder cancer for which you are on chemotherapy as an outpatient. Continue with your chemo regimen and follow up with your oncologist within 1 week of discharge and routinely after.

## 2022-05-06 NOTE — DIETITIAN INITIAL EVALUATION ADULT - OTHER INFO
56 years old female PTA was at [facility] for 5 days treated with antibiotic and was d/c home. A&O x 3 makes needs know, food preferences taken, dietary notified. Pt lives home PTA, alert, oriented, Reported with good Po intake- breakfast consumed this morning received double portion of eggs and Persian toast,  extensive food preferences obtained and forwarded to Dietary, dislikes Ensure, Ensure clear offered Pt declined it makes her nauseated and it gives her gastritis.  No diarrhea No constipation reported- at times resident stated she has constipation but refused prune juice when offered 2/2 it might give her diarrhea, no nausea reported.

## 2022-05-06 NOTE — DISCHARGE NOTE NURSING/CASE MANAGEMENT/SOCIAL WORK - NSDCPEFALRISK_GEN_ALL_CORE
For information on Fall & Injury Prevention, visit: [email or web address] OR  [email or web address] OR  https://www.cdc.gov/steadi/patient.html

## 2022-05-06 NOTE — DIETITIAN INITIAL EVALUATION ADULT - FACTORS AFF FOOD INTAKE
multiple comorbidities: Bladder cancer on chemo, anemia hydronephrosis HTN HLD Asthma UTI./Mosque/ethnic/cultural/personal food preferences/other (specify)

## 2022-05-06 NOTE — DIETITIAN INITIAL EVALUATION ADULT - NS FNS REASON FOR WEIGHT CHANG
Weight loss due to chemotherapy PO intake varies but recently as per pt reported with good intake./other (specify)

## 2022-05-06 NOTE — DIETITIAN INITIAL EVALUATION ADULT - FEEDING ASSISTANCE
Provide food choices within the dieta management, encourage max Po intake.  Provide food choices within the diet management, encourage max Po intake.

## 2022-05-06 NOTE — DIETITIAN INITIAL EVALUATION ADULT - PERTINENT LABORATORY DATA
05-06    140  |  107  |  21<H>  ----------------------------<  85  4.0   |  27  |  0.98    Ca    10.3      06 May 2022 06:10  Phos  2.6     05-05  Mg     2.0     05-05    TPro  6.9  /  Alb  2.8<L>  /  TBili  0.2  /  DBili  x   /  AST  14  /  ALT  13  /  AlkPhos  84  05-05  POCT Blood Glucose.: 97 mg/dL (05-05-22 @ 21:46)  A1C with Estimated Average Glucose Result: 5.5 % (01-16-22 @ 18:39)  A1C with Estimated Average Glucose Result: 5.7 % (07-22-21 @ 10:19)  A1C with Estimated Average Glucose Result: 5.5 % (07-20-21 @ 12:50)

## 2022-05-06 NOTE — DISCHARGE NOTE PROVIDER - NSDCMRMEDTOKEN_GEN_ALL_CORE_FT
acetaminophen 325 mg oral tablet: 2 tab(s) orally every 6 hours, As needed, Temp greater or equal to 38C (100.4F), Mild Pain (1 - 3), Moderate Pain (4 - 6)  Symbicort 80 mcg-4.5 mcg/inh inhalation aerosol: 2 puff(s) inhaled 2 times a day  Vitamin D3 25 mcg (1000 intl units) oral tablet: 1 tab(s) orally once a day

## 2022-05-06 NOTE — DISCHARGE NOTE NURSING/CASE MANAGEMENT/SOCIAL WORK - PATIENT PORTAL LINK FT
You can access the FollowMyHealth Patient Portal offered by [facility] by registering at the following website: [email or web address]. By joining [facility]’s FollowMyHealth portal, you will also be able to view your health information using other applications (apps) compatible with our system.

## 2022-05-06 NOTE — DIETITIAN INITIAL EVALUATION ADULT - PERTINENT MEDS FT
MEDICATIONS  (STANDING):  budesonide  80 MICROgram(s)/formoterol 4.5 MICROgram(s) Inhaler 2 Puff(s) Inhalation two times a day    MEDICATIONS  (PRN):  acetaminophen     Tablet .. 650 milliGRAM(s) Oral every 4 hours PRN Temp greater or equal to 38C (100.4F), Mild Pain (1 - 3), Moderate Pain (4 - 6)  ALBUTerol    90 MICROgram(s) HFA Inhaler 2 Puff(s) Inhalation every 6 hours PRN Shortness of Breath and/or Wheezing

## 2022-05-06 NOTE — PROGRESS NOTE ADULT - ASSESSMENT
M E D I C A L   A T T E N D I N G    F O L L O W    U P   N O T E  (22 )                                     ------------------------------------------------------------------------------------------------    patient evaluated by me, case discussed with team, chart, medications, and physical exam reviewed, labs / tests  and vitals reviewed by me, as bellow.   Patient is stable for discharge today.  Patient to follow up with  Oncologist, PMD    chemo and mgmt of pulmonary mets per onc ( likely cause of chest pain)        UTI ruled out : no abx needed   Continue Current medications otherwise and monitor.   See discharge document for full note.                             ( note written / Date of service  22 )    ==================>> MEDICATIONS <<====================    budesonide  80 MICROgram(s)/formoterol 4.5 MICROgram(s) Inhaler 2 Puff(s) Inhalation two times a day    MEDICATIONS  (PRN):  acetaminophen     Tablet .. 650 milliGRAM(s) Oral every 4 hours PRN Temp greater or equal to 38C (100.4F), Mild Pain (1 - 3), Moderate Pain (4 - 6)  ALBUTerol    90 MICROgram(s) HFA Inhaler 2 Puff(s) Inhalation every 6 hours PRN Shortness of Breath and/or Wheezing    ___________  Active diet:  Diet, Regular  ___________________    ==================>> VITAL SIGNS <<==================    T(C): 36.5 (22 @ 16:08), Max: 36.8 (22 @ 10:55)  HR: 63 (22 @ 16:08) (54 - 90)  BP: 116/65 (22 @ 16:08) (103/67 - 126/74)  BP(mean): 77 (22 @ 16:08)  RR: 18 (22 @ 16:08) (18 - 19)  SpO2: 100% (22 @ 16:08) (93% - 100%)     CAPILLARY BLOOD GLUCOSE    POCT Blood Glucose.: 97 mg/dL (05 May 2022 21:46)     ==================>> LAB AND IMAGING <<==================                        11.0   4.93  )-----------( 201      ( 06 May 2022 06:10 )             34.0        05-06    140  |  107  |  21<H>  ----------------------------<  85  4.0   |  27  |  0.98    Ca    10.3      06 May 2022 06:10  Phos  2.6     05-05  Mg     2.0     05-05    TPro  6.9  /  Alb  2.8<L>  /  TBili  0.2  /  DBili  x   /  AST  14  /  ALT  13  /  AlkPhos  84  05-05    PT/INR - ( 05 May 2022 07:20 )   PT: 13.1 sec;   INR: 1.10 ratio         PTT - ( 05 May 2022 07:20 )  PTT:28.6 sec                 Urinalysis Basic - ( 04 May 2022 21:11 )  Color: Pink / Appearance: Slightly Turbid / S.020 / pH: x  Gluc: x / Ketone: Negative  / Bili: Negative / Urobili: Negative   Blood: x / Protein: 500 mg/dL / Nitrite: Negative   Leuk Esterase: Trace / RBC: >50 /HPF / WBC >50 /HPF   Sq Epi: x / Non Sq Epi: Few /HPF / Bacteria: Many /HPF    HgA1C:   (22)          (22)      5.5  WBC count:   4.93 <<== ,  5.10 <<== ,  6.08 <<==   Hemoglobin:   11.0 <<==,  10.5 <<==,  11.1 <<==  platelets:  201 <==, 205 <==, 208 <==    Creatinine:  0.98  <<==, 0.97  <<==, 1.13  <<==  Sodium:   140  <==, 140  <==, 139  <==       AST:          14 <== , 43 <==      ALT:        13  <== , 20  <==      AP:        84  <=, 84  <=     Bili:        0.2  <=, 0.3  <=       ____________________________    M I C R O B I O L O G Y :    Culture - Urine (collected 05 May 2022 10:14)  Source: Clean Catch Clean Catch (Midstream)  Final Report (06 May 2022 08:50):    <10,000 CFU/mL Normal Urogenital Vandana    COVID-19 PCR: NotDetec (22 @ 11:56)  
  _________________________________________________________________________________________  ========>>  M E D I C A L   A T T E N D I N G    F O L L O W  U P  N O T E  <<=========  -----------------------------------------------------------------------------------------------------    - Patient seen and examined by me approximately sixty minutes ago.  - In summary,  JEF HOUSE is a 56y year old woman admitted with pain  - Patient today overall doing ok, comfortable, eating OK.  + occasional clots from bladder     ==================>> REVIEW OF SYSTEM <<=================    GEN: no fever, no chills, no pain  RESP: no SOB, no cough, no sputum  CVS: no chest pain, no palpitations, no edema  GI: no abdominal pain, no nausea, no constipation, no diarrhea  : no dysuria, no frequency, no hematuria  Neuro: no headache, no dizziness  Derm : no itching, no rash    ==================>> PHYSICAL EXAM <<=================    GEN: A&O X 3 , NAD , comfortable, pleasant, calm, cachectic   HEENT: NCAT, PERRL, MMM, hearing intact  Neck: supple , no JVD appreciated  CVS: S1S2 , regular , No M/R/G appreciated  PULM: CTA B/L,  no W/R/R appreciated  ABD.: soft. non tender, non distended,  bowel sounds present  Extrem: intact pulses , no edema    nephrostomy in place  : recently exchanged per pt   PSYCH : normal mood,  not anxious                            ( Note Written / Date of service :  22 )    ==================>> MEDICATIONS <<====================    MEDICATIONS  (STANDING):  budesonide  80 MICROgram(s)/formoterol 4.5 MICROgram(s) Inhaler 2 Puff(s) Inhalation two times a day  cefTRIAXone   IVPB 1000 milliGRAM(s) IV Intermittent every 24 hours    MEDICATIONS  (PRN):  acetaminophen     Tablet .. 650 milliGRAM(s) Oral every 4 hours PRN Temp greater or equal to 38C (100.4F), Mild Pain (1 - 3), Moderate Pain (4 - 6)  ALBUTerol    90 MICROgram(s) HFA Inhaler 2 Puff(s) Inhalation every 6 hours PRN Shortness of Breath and/or Wheezing    ___________  Active diet:  Diet, Regular  ___________________    ==================>> VITAL SIGNS <<==================    T(C): 36.6 (22 @ 19:50), Max: 36.9 (22 @ 15:10)  HR: 61 (22 @ 19:50) (54 - 68)  BP: 117/67 (22 @ 19:50) (111/57 - 138/75)  BP(mean): --  RR: 18 (22 @ 19:50) (18 - 19)  SpO2: 100% (22 @ 19:50) (99% - 100%)       POCT Blood Glucose.: 77 mg/dL (05 May 2022 04:48)       ==================>> LAB AND IMAGING <<==================                        10.5   5.10  )-----------( 205      ( 05 May 2022 07:20 )             32.1        05-    140  |  106  |  18  ----------------------------<  155<H>  3.7   |  25  |  0.97    Ca    10.0      05 May 2022 07:20  Phos  2.6     05-  Mg     2.0     05-    TPro  6.9  /  Alb  2.8<L>  /  TBili  0.2  /  DBili  x   /  AST  14  /  ALT  13  /  AlkPhos  84  05-05    PT/INR - ( 05 May 2022 07:20 )   PT: 13.1 sec;   INR: 1.10 ratio         PTT - ( 05 May 2022 07:20 )  PTT:28.6 sec                 Urinalysis Basic - ( 04 May 2022 21:11 )    Color: Pink / Appearance: Slightly Turbid / S.020 / pH: x  Gluc: x / Ketone: Negative  / Bili: Negative / Urobili: Negative   Blood: x / Protein: 500 mg/dL / Nitrite: Negative   Leuk Esterase: Trace / RBC: >50 /HPF / WBC >50 /HPF   Sq Epi: x / Non Sq Epi: Few /HPF / Bacteria: Many /HPF    HgA1C:   (22)          (22)      5.5    VQ: very low Prob of PE  ___________________________________________________________________________________  ===============>>  A S S E S S M E N T   A N D   P L A N <<===============  ------------------------------------------------------------------------------------------    · Assessment	  Patient is a 57 y/o F w/ Hx of anemia, asthma, bladder CA (on chemo Gemzar, Carboplatin), R nephrostomy tube, solitary kidney, HTN (not on meds), HLD (not on meds), PPM, parathyroid tumor. Admitted for chest pain to r/o PE pending VQ scan.     Problem/Plan - 1:  ·  Problem: Chest pain.   ·  Plan: p/w pleuritic chest pain  EKG - NSR  Trops x 2 neg (5.5>5.8)  D-dimer - 242 (mildly elevated)  CXR - pulmoparenchymal nodularity (metastatic disease)  VQ Scan negative  pain likely cancer related     Problem/Plan - 2:  ·  Problem: UA+   f/u ucx.  pot recent several abx courses per pt  would hold off further abx and monitor  doubt active UTI  blood clots / blood in urine due to large chronic clot in bladder due to cancer      Problem/Plan - 3:  ·  Problem: Hematuria.   ·  Plan: Hx of hematuria with blood clots  nephrostomy tube clear  Hgb/Hct - 11.34  as above      Problem/Plan - 4:  ·  Problem: Hydronephrosis.   ·  Plan: hx of hydronephrosis  R nephrostomy tube  changed in 2021.     Problem/Plan - 5:  ·  Problem: Asthma.   ·  Plan: Hx of asthma  home med - symbicort  added albuterol prn.     Problem/Plan - 6:  ·  Problem: Hx of bladder cancer  on chemo as outpatient (Gemzar, Caboplatin)  patient unsure of dosage or regimen  has hematuria and blood clots  f/u UA, UCx.     Problem/Plan - 7:  ·  Problem: Pacemaker.   ·  Plan: Hx of bradycardia  PPM placed ([facility])  EKG - NSR.     Problem/Plan - 8:  ·  Problem: Prophylactic measure.   ·  Plan: SCDs for DVT ppx.    favor short hospital stay give immune compromised state    --------------------------------------------  Case discussed with pt, HS  Education given on findings and plan of care  ___________________________  H. NIEVES Ochoa.  Pager: [phone]        
Patient is a 55 y/o F w/ Hx of anemia, asthma, bladder CA (on chemo Gemzar, Carboplatin), R nephrostomy tube, solitary kidney, HTN (not on meds), HLD (not on meds), PPM, parathyroid tumor. Admitted for chest pain to r/o PE pending VQ scan.

## 2022-05-06 NOTE — DIETITIAN NUTRITION RISK NOTIFICATION - TREATMENT: THE FOLLOWING DIET HAS BEEN RECOMMENDED
Diet, Regular (05-04-22 @ 18:50) [Active]  Pt declined nutritional supplements but asking for double portion with meals.

## 2022-05-06 NOTE — DIETITIAN INITIAL EVALUATION ADULT - ETIOLOGY
Acute on chronic comorbidities including cancer with chemotherapy; cultural/ethnical/individual food preferences.

## 2022-05-06 NOTE — DISCHARGE NOTE PROVIDER - HOSPITAL COURSE
Patient is a 57 y/o F w/ Hx of anemia, asthma, bladder CA (on chemo Gemzar, Carboplatin), R nephrostomy tube, solitary kidney, HTN (not on meds), HLD (not on meds), PPM, parathyroid tumor. She came here after 2 days of sharp intermittent pleuritic chest pain. She was recently admitted to [facility] x 5 days last week for antibiotic treatment. She was given IV Zosyn, Levofloxacin and was discharged on home antibiotics (Cefepime as per patient but pharmacy says it's Cefpodoxime). She denies any fever, cough, shortness of breath, palpitation. She attributes her chest pain to the antibiotics affecting her kidneys. Of note, she was recently admitted from March 2 to 14, 2022 due to hypoglycemia and chest pain. She was diagnosed with Hyperinsulinemia. She was also recently seen in the ED on March 23, 2022 due to hematuria and back pain and was subsequently discharged    In the ED, EKG - NSR, Trops x 2 neg (5.5>5.8), D-dimer - 242 (mildly elevated), CXR - pulmoparenchymal nodularity (metastatic disease). She was admitted for chest pain to r/o PE. Cardio Marlene consulted (outpatient cardio). VQ scan very low probability of PE. UA+ but Ucx shows NUGF. Patient received IV antibiotics and is now medically stable for discharge with outpatient oncology follow up. Patient is a 57 y/o F w/ Hx of anemia, asthma, bladder CA (on chemo Gemzar, Carboplatin), R nephrostomy tube, solitary kidney, HTN (not on meds), HLD (not on meds), PPM, parathyroid tumor. She came here after 2 days of sharp intermittent pleuritic chest pain. She was recently admitted to [facility] x 5 days last week for antibiotic treatment. She was given IV Zosyn, Levofloxacin and was discharged on home antibiotics (Cefepime as per patient but pharmacy says it's Cefpodoxime). She denies any fever, cough, shortness of breath, palpitation. She attributes her chest pain to the antibiotics affecting her kidneys. Of note, she was recently admitted from March 2 to 14, 2022 due to hypoglycemia and chest pain. She was diagnosed with Hyperinsulinemia. She was also recently seen in the ED on March 23, 2022 due to hematuria and back pain and was subsequently discharged    In the ED, EKG - NSR, Trops x 2 neg (5.5>5.8), D-dimer - 242 (mildly elevated), CXR - pulmoparenchymal nodularity (metastatic disease). She was admitted for chest pain to r/o PE. Cardio Marlene consulted (outpatient cardio). VQ scan very low probability of PE. UA+ but Ucx shows NUGF. Patient received IV antibiotics and is now medically stable for discharge with outpatient oncology follow up.    The patient is medically stable and ready for discharge. Case discussed with attending doctor. (3) slightly limited

## 2022-05-09 NOTE — HISTORY OF PRESENT ILLNESS
[FreeTextEntry1] : 55yo female with cc of TCC. Pt was seen by Dr Davidson back in 8/2019. She had hematuria and underwent eval with CT revealing a probable UP transitional cell ca based on L URS and abnormal cytology that localized to L but bx was non-contributory. Plan was made for probable L nephroU. Seems pt was lost to follow-up. She reports she did not have uppertract disease and only had blockage managed with stent. Review of records showed HG Ta TCC as above and bladder with HG T1. Scans showed nodularity on L concerning for extension outside bladder. She had PET with PET avid LAD but Had subsequent bx that was negative. Plan made for neoadjuvant chemo (GC) followed by L nephro-U and cystectomy. She had 2 cycles and was admitted to hospital in Jan for CRYSTAL and R flank pain. Had new R hydro. CRYSTAL improved and now at recent ER visit was back to 1.0. She reports PET in April at [facility] but no records. Plan made for repeat PET given time interval. This showed a new L para-aortic LN suspicious for mets that was PET avid. Prior node does not appear avid. Discussed bx with IR but MRI recommended. Pt cant do because of pacemaker. \par \par Plan made for TURBT for pathology. Path showed HG and LG Ta disease in bladder. There was incomplete resection given tumor burden. Discussed case at tumor board. Again PET avid node does not appear to be ammenable to biopsy. Discussed cystectomy and L nephroU but pt was adamant she did not want surgery. SHe clinically appeared to have T2 disease at a minimum possibly met. She decided to seek care and another opinion outside system. Pt has not been seen by me in 8mo. \par \par SHe returns today for follow-up. Pt reports that she started chemo back in Dec. She is on gem/carbo. She had some cessation of tx in Jan for covid but has been ok since. Reports she has had a couple UTIs. Had CT in March that showed no real change in findings.

## 2022-05-09 NOTE — ASSESSMENT
[FreeTextEntry1] : Initially with organ confined bladder cancer but concern for high risk (T3) with L hydro and atrophy. PET avid new node not ammenable to bx. Suspect met disease. Now on chemo with unclear tx plan \par --Cont PCN change. Due next month\par --Encourage fluid intake\par --Cont onc f/u \par --? redo PET after chemo to eval node\par --Avoid abx unless systemic signs of infection as pt colonized due to indwelling PCN long term

## 2022-05-09 NOTE — PHYSICAL EXAM
[General Appearance - In No Acute Distress] : no acute distress [Abdomen Soft] : soft [Costovertebral Angle Tenderness] : no ~M costovertebral angle tenderness [Exaggerated Use Of Accessory Muscles For Inspiration] : no accessory muscle use [Oriented To Time, Place, And Person] : oriented to person, place, and time [FreeTextEntry1] : R PCN in place with clear yellow urine

## 2022-05-18 NOTE — ED PROVIDER NOTE - CONSTITUTIONAL, MLM
Reason for Call:  Medication refill:    Do you use a [facility]? Lamar of the pharmacy and phone number for the current request: [facility] [place]     Name of the medication requested:   Dulozetine 60 mg capsule    Other request: Dulaglutide (Trulicity) appear on medication list on [facility] and she is wondering why.    Can we leave a detailed message on this number? YES    Phone number patient can be reached at: Home number on file [phone] (home)    Best Time: Any    Call taken on 5/18/2022 at 11:26 AM by Selma Daniels     normal... Well appearing, well nourished, awake, alert, oriented to person, place, time/situation and in no apparent distress. GENERAL: wells appearing, no acute distress   HEAD: atraumatic   EYES: EOMI, pink conjunctiva   ENT: moist oral mucosa   CARDIAC: RRR, no edema, distal pulses present   RESPIRATORY: lungs CTAB, no increased work of breathing   GASTROINTESTINAL: no abdominal tenderness, no rebound or guarding, bowel sounds presents  GENITOURINARY: no CVA tenderness   MUSCULOSKELETAL: no deformity   NEUROLOGICAL: AAOx3, strength 5/5 bilateral UE and LE, sensation intact to light touch, finger to nose intact, steady gait  SKIN: intact   PSYCHIATRIC: cooperative  HEME LYMPH: no lymphadenopathy GENERAL: wells appearing, no acute distress   HEAD: atraumatic   EYES: pink conjunctiva, no ptosis   ENT: moist oral mucosa   CARDIAC: RRR, no edema, distal pulses present   RESPIRATORY: lungs CTAB, no increased work of breathing   GASTROINTESTINAL: no abdominal tenderness, no rebound or guarding, bowel sounds presents  GENITOURINARY: no CVA tenderness   MUSCULOSKELETAL: no deformity   NEUROLOGICAL: AAOx3, strength 5/5 bilateral UE and LE, sensation intact to light touch, finger to nose intact, steady gait, +R facial drooping/asymmetric when smiling (chronic 2/2 to remote hx of R sided Bell's palsy), facial sensation intact, EOMI, PERRLA, no nystagmus, full visual fields, visual acuity at baseline   SKIN: intact   PSYCHIATRIC: cooperative  HEME LYMPH: no lymphadenopathy

## 2022-06-03 NOTE — ED PROVIDER NOTE - CHIEF COMPLAINT
MRI abdomen/pelvis reveals no pancreatic mass The patient is a 56y Female complaining of chest pain.

## 2022-06-09 NOTE — ED PROVIDER NOTE - PHYSICAL EXAMINATION
General: uncomfortable appearing female, no acute distress   HEENT: normocephalic, atraumatic   Respiratory: normal work of breathing, lungs clear to auscultation bilaterally   Cardiac: regular rate and rhythm   Abdomen: soft, non-tender, no guarding or rebound, left nephrectomy tube   MSK: no swelling or tenderness of lower extremities, moving all extremities spontaneously   Skin: warm, dry   Neuro: A&Ox3  Psych: appropriate affect

## 2022-06-09 NOTE — ED PROVIDER NOTE - OBJECTIVE STATEMENT
anemia, asthma, bladder CA (on chemo Gemzar, Carboplatin), R nephrostomy tube, solitary kidney, HTN (not on meds), HLD (not on meds), PPM, parathyroid tumor 56F, pmh of anemia, asthma, bladder CA (on chemo Gemzar, Carboplatin), R nephrostomy tube, solitary kidney, HTN (not on meds), HLD (not on meds), PPM, parathyroid tumor, presenting with several days of chest pain and right flank pain. has blood in urine and is concerned she may have a urine infection. no fever, headache, shortness of breath.

## 2022-06-09 NOTE — ED ADULT NURSE NOTE - NSIMPLEMENTINTERV_GEN_ALL_ED
Implemented All Universal Safety Interventions:  [place] to call system. Call bell, personal items and telephone within reach. Instruct patient to call for assistance. Room bathroom lighting operational. Non-slip footwear when patient is off stretcher. Physically safe environment: no spills, clutter or unnecessary equipment. Stretcher in lowest position, wheels locked, appropriate side rails in place.

## 2022-06-09 NOTE — ED PROVIDER NOTE - CLINICAL SUMMARY MEDICAL DECISION MAKING FREE TEXT BOX
56F presenting with chest and flank pain. chest pain not exertional or pleuritic. concern for uti/pyelo. will get labs, UA. will reassess.

## 2022-06-09 NOTE — ED PROVIDER NOTE - NSICDXPASTMEDICALHX_GEN_ALL_CORE_FT
No PAST MEDICAL HISTORY:  Anemia     Asthma     Bladder cancer     Bradycardia     HLD (hyperlipidemia)     HTN (hypertension)     Hydronephrosis has right Nephrostomy tube - dressing intact    Liver cyst     Pacemaker [facility] [facility]    Parathyroid tumor

## 2022-06-09 NOTE — ED PROVIDER NOTE - NSFOLLOWUPINSTRUCTIONS_ED_ALL_ED_FT
You were seen in the emergency department for chest and flank  pain.     Please follow-up with your primary care doctor in the next 24-48 hours.     If you have any worsening symptoms, severe headache, chest pain, shortness of breath, nausea or vomiting, please return to the emergency department.

## 2022-06-10 NOTE — H&P ADULT - PROBLEM SELECTOR PLAN 1
R nephrostomy tube  CT AP with nonobstructing calculus in R lower pole  start rocephin 1g qd  will give 1 dose vancomycin 500 to cover for mrsa given foreign body - dose by trough due to CrCl  ua positive - 4pm ua results from leg bag. midnight results not from leg bag  pain control - tylenol prn  f/u urine culture  IR Consult placed for nephrostomy tube exchange R nephrostomy tube  CT AP with nonobstructing calculus in R lower pole  start rocephin 1g qd  Pt refused vancomycin, will hold for now  ua positive - 4pm ua results from leg bag. midnight results not from leg bag  pain control - tylenol prn  f/u urine culture  IR Consult placed for nephrostomy tube exchange

## 2022-06-10 NOTE — PROGRESS NOTE ADULT - PROBLEM SELECTOR PLAN 7
- Patient came from home  - Discharge disposition most likely home pending clinical course & improvement.

## 2022-06-10 NOTE — PROGRESS NOTE ADULT - PROBLEM SELECTOR PLAN 1
- R nephrostomy tube  - UA +  - CT AP with non obstructing calculus in R lower pole  - C/W Rocephin   - F/U urine culture  - IR following  for nephrostomy tube exchange on 6/13

## 2022-06-10 NOTE — H&P ADULT - HISTORY OF PRESENT ILLNESS
56F from home, w/ PMH Anemia, asthma, bladder CA (on Gemzar and Carboplatin), R Nephrostomy tube, solitary kidney, HTN not on meds, HLD, Parathyroid tumor came in for chills x2 days. Pt presented to the ED as she felt like she has a UTI. Pt states that she had chills last time she had a UTI. Pt states that she is supposed to her get her nephrostomy tube changed by Sirisha in IR this June. Pt reports feeling worried and anxious about her infection and receiving antibiotics other than rocephin as she reports that the other antibiotics Cipro and Zosyn they she received in [facility] were too strong for her/   56F from home, w/ PMH Anemia, asthma, bladder CA (on Gemzar and Carboplatin), R Nephrostomy tube, solitary kidney, HTN not on meds, HLD, Parathyroid tumor came in for chills x2 days. Pt presented to the ED as she felt like she has a UTI. Pt states that she had chills last time she had a UTI. Pt states that she is supposed to her get her nephrostomy tube changed by Sirisha in IR this June. Pt reports feeling worried and anxious about her infection and receiving antibiotics other than rocephin as she reports that the other antibiotics Cipro and Zosyn they she received in [facility] were too strong for her.

## 2022-06-10 NOTE — H&P ADULT - NSHPPHYSICALEXAM_GEN_ALL_CORE
General - NAD, sitting up in bed, well groomed, cachectic  Eyes - PERRLA, EOM intact  ENT - Nonicteric sclerae, PERRLA, EOMI. Oropharynx clear. Moist mucous membranes. Conjunctivae appear well perfused.   Neck - No noticeable or palpable swelling, redness or rash around throat or on face  Cardiovascular - RRR no m/r/g, no JVD, no carotid bruits  Lungs - Clear to auscultation, no use of accessory muscles, no crackles or wheezes.  Skin - No rashes, skin warm and dry, no erythematous areas  Abdomen - Normal bowel sounds, abdomen soft and nontender, NO CVA tenderness  Rectal – Rectal exam not performed since no symptoms indicated blood loss.  Extremities - No edema, cyanosis or clubbing  Musculoskeletal - 5/5 strength, normal range of motion, no swollen or erythematous joints.  Neuro– Alert and oriented x 3, CN 2-12 grossly intact. Height (cm): 162.6  Weight (kg): 39.5  BMI (kg/m2): 14.9  Vital Signs Last 24 HrsT(C): 37.2 (06-10-22 @ 06:42)  T(F): 99 (06-10-22 @ 06:42), Max: 99 (06-10-22 @ 06:42)  HR: 58 (06-10-22 @ 06:42) (58 - 76)  BP: 105/68 (06-10-22 @ 06:42)  RR: 17 (06-10-22 @ 06:42) (16 - 17)  SpO2: 98% (06-10-22 @ 06:42) (95% - 98%)      CAPILLARY BLOOD GLUCOSE  POCT Blood Glucose.: 54 mg/dL (10 Flakito 2022 11:45)  POCT Blood Glucose.: 92 mg/dL (10 Flakito 2022 02:38)      General - NAD, sitting up in bed, well groomed, cachectic  Eyes - PERRLA, EOM intact  ENT - Nonicteric sclerae, PERRLA, EOMI. Oropharynx clear. Moist mucous membranes. Conjunctivae appear well perfused.   Neck - No noticeable or palpable swelling, redness or rash around throat or on face  Cardiovascular - RRR no m/r/g, no JVD, no carotid bruits  Lungs - Clear to auscultation, no use of accessory muscles, no crackles or wheezes.  Skin - No rashes, skin warm and dry, no erythematous areas  Abdomen - Normal bowel sounds, abdomen soft and nontender, NO CVA tenderness  Rectal – Rectal exam not performed since no symptoms indicated blood loss.  Extremities - No edema, cyanosis or clubbing  Musculoskeletal - 5/5 strength, normal range of motion, no swollen or erythematous joints.  Neuro– Alert and oriented x 3, CN 2-12 grossly intact.

## 2022-06-10 NOTE — H&P ADULT - ASSESSMENT
56F from home, w/ PMH Anemia, asthma, bladder CA (on Gemzar and Carboplatin), R Nephrostomy tube, solitary kidney, HTN not on meds, HLD, Parathyroid tumor came in for chills x2 days admitted for UTI

## 2022-06-10 NOTE — PATIENT PROFILE ADULT - FALL HARM RISK - UNIVERSAL INTERVENTIONS
Bed in lowest position, wheels locked, appropriate side rails in place/Call bell, personal items and telephone in reach/Instruct patient to call for assistance before getting out of bed or chair/Non-slip footwear when patient is out of bed/[place] to call system/Physically safe environment - no spills, clutter or unnecessary equipment/Purposeful Proactive Rounding/Room/bathroom lighting operational, light cord in reach

## 2022-06-10 NOTE — H&P ADULT - NSHPADDITIONALINFOADULT_GEN_ALL_CORE
Patient seen, examined, and interviewed by me, case discussed with me, chart reviewed, agree with above H/P as reviewed.  See  full note above.  Dr. LUIS ANGEL Ochoa ([phone])    per discussion with pt and IR, pt absolutely requesting to have anesthesia for tube change, but she ate this morning... so IR to reschedule for Monday Morning...  follow culture  cont abx

## 2022-06-11 NOTE — PROGRESS NOTE ADULT - SUBJECTIVE AND OBJECTIVE BOX
HPI:  56F from home, w/ PMH Anemia, asthma, bladder CA (on Gemzar and Carboplatin), R Nephrostomy tube, solitary kidney, HTN not on meds, HLD, Parathyroid tumor came in for chills x2 days. Pt presented to the ED as she felt like she has a UTI. Pt states that she had chills last time she had a UTI. Pt states that she is supposed to her get her nephrostomy tube changed by Sirisha in IR this . Pt reports feeling worried and anxious about her infection and receiving antibiotics other than rocephin as she reports that the other antibiotics Cipro and Zosyn they she received in [facility] were too strong for her.   (10 Flakito 2022 03:15)      Patient is a 56y old  Female who presents with a chief complaint of UTI (10 Flakito 2022 18:21)      INTERVAL HPI/OVERNIGHT EVENTS:  T(C): 36.9 (22 @ 12:30), Max: 36.9 (22 @ 12:30)  HR: 65 (22 @ 12:30) (55 - 70)  BP: 97/65 (22 @ 12:30) (97/65 - 109/62)  RR: 17 (22 @ 12:30) (17 - 18)  SpO2: 100% (22 @ 12:30) (100% - 100%)  Wt(kg): --  I&O's Summary      REVIEW OF SYSTEMS: denies fever, chills, SOB, palpitations, chest pain, abdominal pain, nausea, vomitting, diarrhea, constipation, dizziness    MEDICATIONS  (STANDING):  cefTRIAXone   IVPB 1000 milliGRAM(s) IV Intermittent every 24 hours  chlorhexidine 2% Cloths 1 Application(s) Topical daily  cholecalciferol 1000 Unit(s) Oral daily  lactated ringers. 1000 milliLiter(s) (40 mL/Hr) IV Continuous <Continuous>  senna 2 Tablet(s) Oral at bedtime    MEDICATIONS  (PRN):  acetaminophen     Tablet .. 650 milliGRAM(s) Oral every 6 hours PRN Temp greater or equal to 38C (100.4F), Moderate Pain (4 - 6)      PHYSICAL EXAM:  GENERAL: NAD, well-groomed, well-developed  HEAD:  Atraumatic, Normocephalic  EYES: EOMI, PERRLA, conjunctiva and sclera clear  ENMT: No tonsillar erythema, exudates, or enlargement; Moist mucous membranes, Good dentition, No lesions  NECK: Supple, No JVD, Normal thyroid  NERVOUS SYSTEM:  Alert & Oriented X3, Good concentration; Motor Strength 5/5 B/L upper and lower extremities; DTRs 2+ intact and symmetric  CHEST/LUNG: Clear to percussion bilaterally; No rales, rhonchi, wheezing, or rubs  HEART: Regular rate and rhythm; No murmurs, rubs, or gallops  ABDOMEN: Soft, Nontender, Nondistended; Bowel sounds present  EXTREMITIES:  2+ Peripheral Pulses, No clubbing, cyanosis, or edema  LYMPH: No lymphadenopathy noted  SKIN: No rashes or lesions  LABS:                        10.2   4.88  )-----------( 193      ( 2022 07:02 )             31.1     06-11    140  |  107  |  15  ----------------------------<  81  4.2   |  27  |  0.86    Ca    9.4      2022 07:02  Phos  3.5     06-10  Mg     2.4     06-10    TPro  7.3  /  Alb  2.9<L>  /  TBili  0.2  /  DBili  x   /  AST  17  /  ALT  13  /  AlkPhos  94  06-10      Urinalysis Basic - ( 10 Flakito 2022 00:25 )    Color: Red / Appearance: bloody / S.030 / pH: x  Gluc: x / Ketone: Trace  / Bili: Negative / Urobili: Negative   Blood: x / Protein: 500 mg/dL / Nitrite: Negative   Leuk Esterase: Trace / RBC: >50 /HPF / WBC >50 /HPF   Sq Epi: x / Non Sq Epi: Moderate /HPF / Bacteria: Moderate /HPF      CAPILLARY BLOOD GLUCOSE      POCT Blood Glucose.: 96 mg/dL (2022 16:27)  POCT Blood Glucose.: 76 mg/dL (2022 03:43)        Urinalysis Basic - ( 10 Flakito 2022 00:25 )    Color: Red / Appearance: bloody / S.030 / pH: x  Gluc: x / Ketone: Trace  / Bili: Negative / Urobili: Negative   Blood: x / Protein: 500 mg/dL / Nitrite: Negative   Leuk Esterase: Trace / RBC: >50 /HPF / WBC >50 /HPF   Sq Epi: x / Non Sq Epi: Moderate /HPF / Bacteria: Moderate /HPF    
Patient was to have her nephrostomy tube changed today in IR with sedation.  Patient called the IR department when the transporter was there to pick her up and said that "they dont want me to have the procedure today,  and want to wait till next week"  NP called to verify, she was not aware of this change of plans and then spoke to Dr Trujillo who said he did want her to have the procedure today.  At this point the patient told the staff that she had eaten.  The patient has had this procedure multiple times and is well aware that she cant eat prior to.  
NP Note discussed with  primary attending    Patient is a 56y old  Female who presents with a chief complaint of UTI (10 Flakito 2022 12:55)      INTERVAL HPI/OVERNIGHT EVENTS: no new complaints    MEDICATIONS  (STANDING):  cefTRIAXone   IVPB 1000 milliGRAM(s) IV Intermittent every 24 hours  cholecalciferol 1000 Unit(s) Oral daily  lactated ringers. 1000 milliLiter(s) (40 mL/Hr) IV Continuous <Continuous>    MEDICATIONS  (PRN):  acetaminophen     Tablet .. 650 milliGRAM(s) Oral every 6 hours PRN Temp greater or equal to 38C (100.4F), Moderate Pain (4 - 6)      __________________________________________________  REVIEW OF SYSTEMS:    CONSTITUTIONAL: No fever,   EYES: no acute visual disturbances  NECK: No pain or stiffness  RESPIRATORY: No cough; No shortness of breath  CARDIOVASCULAR: No chest pain, no palpitations  GASTROINTESTINAL: No pain. No nausea or vomiting; No diarrhea   NEUROLOGICAL: No headache or numbness, no tremors  MUSCULOSKELETAL: No joint pain, no muscle pain  GENITOURINARY: no dysuria, no frequency, no hesitancy  PSYCHIATRY: no depression , no anxiety  ALL OTHER  ROS negative        Vital Signs Last 24 Hrs  T(C): 37.4 (10 Flakito 2022 13:58), Max: 37.4 (10 Flakito 2022 13:58)  T(F): 99.3 (10 Flakito 2022 13:58), Max: 99.3 (10 Flakito 2022 13:58)  HR: 70 (10 Flakito 2022 13:58) (58 - 70)  BP: 104/68 (10 Flakito 2022 13:58) (104/68 - 128/76)  BP(mean): --  RR: 17 (10 Flakito 2022 13:58) (16 - 17)  SpO2: 96% (10 Flakito 2022 13:58) (95% - 98%)    ________________________________________________  PHYSICAL EXAM:  GENERAL: NAD  HEENT: Normocephalic;  conjunctivae and sclerae clear; moist mucous membranes;   NECK : supple  CHEST/LUNG: Clear to auscultation bilaterally with good air entry   HEART: S1 S2  regular; no murmurs, gallops or rubs  ABDOMEN: Soft, Nontender, Nondistended; Bowel sounds present  EXTREMITIES: no cyanosis; no edema; no calf tenderness  : + right nephrostomy tube  SKIN: warm and dry; no rash  NERVOUS SYSTEM:  Awake and alert; Oriented  to place, person and time ; no new deficits    _________________________________________________  LABS:                        11.2   5.76  )-----------( 219      ( 10 Flakito 2022 07:05 )             34.9     06-10    138  |  104  |  17  ----------------------------<  86  4.6   |  25  |  0.98    Ca    9.9      10 Flakito 2022 07:05  Phos  3.5     06-10  Mg     2.4     06-10    TPro  7.3  /  Alb  2.9<L>  /  TBili  0.2  /  DBili  x   /  AST  17  /  ALT  13  /  AlkPhos  94  06-10      Urinalysis Basic - ( 10 Flakito 2022 00:25 )    Color: Red / Appearance: bloody / S.030 / pH: x  Gluc: x / Ketone: Trace  / Bili: Negative / Urobili: Negative   Blood: x / Protein: 500 mg/dL / Nitrite: Negative   Leuk Esterase: Trace / RBC: >50 /HPF / WBC >50 /HPF   Sq Epi: x / Non Sq Epi: Moderate /HPF / Bacteria: Moderate /HPF      CAPILLARY BLOOD GLUCOSE      POCT Blood Glucose.: 111 mg/dL (10 Flakito 2022 12:26)  POCT Blood Glucose.: 54 mg/dL (10 Flakito 2022 11:45)  POCT Blood Glucose.: 92 mg/dL (10 Flakito 2022 02:38)        RADIOLOGY & ADDITIONAL TESTS:    Imaging Personally Reviewed:  YES/NO  < from: CT Abdomen and Pelvis No Cont (22 @ 19:25) >  IMPRESSION:  Status post right nephrostomy. Nonobstructive right nephrolithiasis. No   obstructive urolithiasis bilaterally  Gas in the right renal collecting system may reflect instrumentation.   Cannot exclude infection.  Chronic left hydronephrosis similar to prior.  Bladder neoplasm similar to prior  Large colonic stool burden.  Additional findings as discussed        < end of copied text >  < from: Xray Chest 2 Views PA/Lat (22 @ 16:31) >    PULMONARY: Bilateral increasing pulmonary nodules lateral lower zones,   largest measuring 2 cm consistent with pulmonary metastasis.  .  No pleural effusion or pneumothorax.    HEART/VASCULAR: The heart size and mediastinum configuration are within   the limits of normal. Cardiac device wire leads are within right atrium   and right ventricle.    BONES: The visualized osseus structures are intact.    IMPRESSION: Bilateral increasing pulmonary metastatic nodules.    --- End of Report ---    < end of copied text >    Consultant(s) Notes Reviewed:   YES/ No    Care Discussed with Consultants :     Plan of care was discussed with patient and /or primary care giver; all questions and concerns were addressed and care was aligned with patient's wishes.

## 2022-06-13 NOTE — DISCHARGE NOTE PROVIDER - PROVIDER TOKENS
FREE:[LAST:[Marlene],FIRST:[Priscila],PHONE:[phone]],FAX:[(   [phone]    -],ADDRESS:[[address] [place] [address]],FOLLOWUP:[1 week]]

## 2022-06-13 NOTE — CHART NOTE - NSCHARTNOTEFT_GEN_A_CORE
I discussed this morning with medical NP and later with IR Sirisha TINOCO..  appears the conversation pt was referring to in regards to doing procedure on Wednesday morning had happened days before pt's admission to the hospital, and that per IR pt was aware that she was supposed to be NPO for procedure today and not on Wednesday because now that schedule has been filled and no slots are available on Wed Morning...  pt already ate this morning and as per IR, next available time for IR procedure with anesthesia is next Monday..  there is in my opinion no urgency to have procedure done as inpatient as low risk of active infection involving catheter and there has been good drainage as well  no reason to keep pt in hospital til next week !   additionally upon  reviewing MAR, pt has been refusing bactrim any way as well ( after having a very long discussion with her yesterday >> so I discontinued it)   will plan to DC pt home and follow up as OP with IR next Monday  no additional imaging needed  will follow  Dr. LUIS ANGEL Ochoa ([phone])

## 2022-06-13 NOTE — DISCHARGE NOTE PROVIDER - NSDCMRMEDTOKEN_GEN_ALL_CORE_FT
acetaminophen 325 mg oral tablet: 2 tab(s) orally every 6 hours, As needed, Temp greater or equal to 38C (100.4F), Mild Pain (1 - 3), Moderate Pain (4 - 6)  cefpodoxime 200 mg oral tablet: 1 tab(s) orally 2 times a day   Symbicort 80 mcg-4.5 mcg/inh inhalation aerosol: 2 puff(s) inhaled 2 times a day  Vitamin D3 25 mcg (1000 intl units) oral tablet: 1 tab(s) orally once a day   Symbicort 80 mcg-4.5 mcg/inh inhalation aerosol: 2 puff(s) inhaled 2 times a day  Vitamin D3 25 mcg (1000 intl units) oral tablet: 1 tab(s) orally once a day   Vitamin D3 25 mcg (1000 intl units) oral tablet: 1 tab(s) orally once a day

## 2022-06-13 NOTE — DISCHARGE NOTE PROVIDER - NSDCCPCAREPLAN_GEN_ALL_CORE_FT
PRINCIPAL DISCHARGE DIAGNOSIS  Diagnosis: UTI (urinary tract infection)  Assessment and Plan of Treatment: You were admitted for UTI, your urine culture showed that you have Enterobacter UTI likely due to your nephrostomy tube. You were treated with IV antibiotics. Your symptoms improved. HOME CARE INSTRUCTIONS  f you were prescribed antibiotics, take them exactly as your caregiver instructs you. Finish the medication even if you feel better after you have only taken some of the medication.  Drink enough water and fluids to keep your urine clear or pale yellow.  Avoid caffeine, tea, and carbonated beverages. They tend to irritate your bladder.  Empty your bladder often. Avoid holding urine for long periods of time.  Empty your bladder before and after sexual intercourse.  After a bowel movement, women should cleanse from front to back. Use each tissue only once.  SEEK MEDICAL CARE IF:  You have back pain.  You develop a fever.  Your symptoms do not begin to resolve within 3 days.  SEEK IMMEDIATE MEDICAL CARE IF:  You have severe back pain or lower abdominal pain.  You develop chills.  You have nausea or vomiting.  You have continued burning or discomfort with urination.  Follow up with your PCP.        SECONDARY DISCHARGE DIAGNOSES  Diagnosis: Nephrostomy status  Assessment and Plan of Treatment: You have a history of right nephrostomy likely secondary right hydronephrosis. You will be following up with JEFF rivera as an out patientfor nephrostomy exchange on Monday 6/20. Please do not eat after midnight on 6/19, because you will be given anesthesia for the exchange.    Diagnosis: Bladder cancer  Assessment and Plan of Treatment: You have a hostory of bladder cancer. Follow up with your out patient Hem/oncology with further management.

## 2022-06-13 NOTE — PROGRESS NOTE ADULT - ASSESSMENT
M E D I C A L   A T T E N D I N G    F O L L O W    U P   N O T E  (06-13-22 )                                     ------------------------------------------------------------------------------------------------    patient evaluated by me, case discussed with team, chart, medications, and physical exam reviewed, labs / tests  and vitals reviewed by me, as bellow.   Patient is stable for discharge today.  Patient to follow up with Oncologist, , IR , nephrology, cardio / PMD ...    See discharge document for full note.                             ( note written / Date of service  06-13-22 )    ==================>> MEDICATIONS <<====================    chlorhexidine 2% Cloths 1 Application(s) Topical daily  cholecalciferol 1000 Unit(s) Oral daily  lactated ringers. 1000 milliLiter(s) IV Continuous <Continuous>  lactobacillus acidophilus 1 Tablet(s) Oral three times a day with meals  senna 2 Tablet(s) Oral at bedtime    MEDICATIONS  (PRN):  acetaminophen     Tablet .. 650 milliGRAM(s) Oral every 6 hours PRN Temp greater or equal to 38C (100.4F), Moderate Pain (4 - 6)    ___________  Active diet:  Diet, Regular  ___________________    ==================>> VITAL SIGNS <<==================    T(C): 36.7 (06-13-22 @ 13:00), Max: 36.7 (06-13-22 @ 13:00)  HR: 74 (06-13-22 @ 13:00) (59 - 74)  BP: 115/73 (06-13-22 @ 13:00) (109/60 - 116/62)  BP(mean): 81 (06-13-22 @ 13:00)  RR: 18 (06-13-22 @ 14:48) (17 - 18)  SpO2: 97% (06-13-22 @ 14:48) (92% - 100%)     CAPILLARY BLOOD GLUCOSE  POCT Blood Glucose.: 66 mg/dL (13 Jun 2022 11:48)  POCT Blood Glucose.: 69 mg/dL (13 Jun 2022 07:34)     ==================>> LAB AND IMAGING <<==================                        11.0   4.90  )-----------( 203      ( 13 Jun 2022 07:04 )             34.3        06-13    138  |  103  |  21<H>  ----------------------------<  85  4.5   |  29  |  1.03    Ca    10.5      13 Jun 2022 07:04    WBC count:   4.90 <<== ,  5.05 <<== ,  4.88 <<== ,  5.76 <<== ,  6.31 <<==   Hemoglobin:   11.0 <<==,  10.0 <<==,  10.2 <<==,  11.2 <<==,  10.9 <<==  platelets:  203 <==, 163 <==, 193 <==, 219 <==, 221 <==    Creatinine:  1.03  <<==, 0.99  <<==, 0.86  <<==, 0.98  <<==, 1.47  <<==  Sodium:   138  <==, 139  <==, 140  <==, 138  <==, 137  <==       AST:          17 <== , 18 <==      ALT:        13  <== , 15  <==      AP:        94  <=, 92  <=     Bili:        0.2  <=, 0.3  <=       
  _________________________________________________________________________________________  ========>>  M E D I C A L   A T T E N D I N G    F O L L O W  U P  N O T E  <<=========  -----------------------------------------------------------------------------------------------------    - Patient seen and examined by me earlier today.   - In summary,  JEF HOUSE is a 56y year old woman admitted with pain, chills , fever, complicated / complex UTI / pyelo  - Patient today overall doing ok, comfortable, eating OK. pain better, , chills / fever subsided   >>> patient tells me she spoke with Sirisha from IR and only available morning time slot available is for Wednesday or Thursday >>> pt only wants to do procedure in the morning due to prior hypoglycemic events while NPO  ( and even when not NPO) and does not want to be on D5 drip to long due to cancer   had a separate long discussion with pt re antibiotics, resistance profile as bellow.. pt with a lost of antibiotics she will not take due to prior reactions / intolerance .. agreed to take bactrim til tube is changed..     ==================>> REVIEW OF SYSTEM <<=================    GEN: no fever, no chills,  pain.. as above   RESP: no SOB, no cough, no sputum  CVS: no chest pain, no palpitations, no edema  GI: no abdominal pain, no nausea, no constipation, no diarrhea  : no dysuria, no frequency, no hematuria, nephrostomy draining well   Neuro: no headache, no dizziness  Derm : no itching, no rash    ==================>> PHYSICAL EXAM <<=================    GEN: A&O X 3 , NAD , comfortable, pleasant, calm   HEENT: NCAT, PERRL, MMM, hearing intact  Neck: supple , no JVD appreciated  CVS: S1S2 , regular , No M/R/G appreciated  PULM: CTA B/L,  no W/R/R appreciated  ABD.: soft. non tender, non distended,  bowel sounds present  Extrem: intact pulses , no edema   PSYCH : normal mood,  not anxious                            ( Note Written / Date of service :  06-12-22 )    ==================>> MEDICATIONS <<====================    MEDICATIONS  (STANDING):  chlorhexidine 2% Cloths 1 Application(s) Topical daily  cholecalciferol 1000 Unit(s) Oral daily  lactated ringers. 1000 milliLiter(s) (40 mL/Hr) IV Continuous <Continuous>  senna 2 Tablet(s) Oral at bedtime  trimethoprim / sulfamethoxazole IVPB 80 milliGRAM(s) IV Intermittent two times a day    MEDICATIONS  (PRN):  acetaminophen     Tablet .. 650 milliGRAM(s) Oral every 6 hours PRN Temp greater or equal to 38C (100.4F), Moderate Pain (4 - 6)    ___________  Active diet:  Diet, Regular  ___________________    ==================>> VITAL SIGNS <<==================    T(C): 36.4 (06-12-22 @ 05:10), Max: 36.9 (06-11-22 @ 12:30)  HR: 55 (06-12-22 @ 05:10) (55 - 77)  BP: 110/56 (06-12-22 @ 05:10) (96/57 - 110/56)  RR: 18 (06-12-22 @ 05:10) (17 - 18)  SpO2: 97% (06-12-22 @ 05:10) (97% - 100%)     CAPILLARY BLOOD GLUCOSE  POCT Blood Glucose.: 101 mg/dL (12 Jun 2022 11:11)  POCT Blood Glucose.: 86 mg/dL (12 Jun 2022 06:18)  POCT Blood Glucose.: 96 mg/dL (11 Jun 2022 16:27)    I&O's Summary    11 Jun 2022 07:01  -  12 Jun 2022 07:00  --------------------------------------------------------  IN: 0 mL / OUT: 275 mL / NET: -275 mL       ==================>> LAB AND IMAGING <<==================                        10.0   5.05  )-----------( 163      ( 12 Jun 2022 06:37 )             31.6        06-12    139  |  105  |  22<H>  ----------------------------<  84  4.5   |  27  |  0.99    Ca    9.8      12 Jun 2022 06:37    ____________________________    M I C R O B I O L O G Y :    Culture - Urine (collected 10 [place] 2022 03:04)  Source: .Urine Nephrostomy - Right  Final Report (12 Jun 2022 10:16):    >100,000 CFU/ml Enterobacter cloacae complex  Organism: Enterobacter cloacae complex (12 Jun 2022 10:16)  Organism: Enterobacter cloacae complex (12 Jun 2022 10:16)    Sensitivities:      -  Amikacin: S <=16      -  Amoxicillin/Clavulanic Acid: R >16/8      -  Ampicillin: R >16 These ampicillin results predict results for amoxicillin      -  Ampicillin/Sulbactam: R >16/8 Enterobacter, Klebsiella aerogenes, Citrobacter, and Serratia may develop resistance during prolonged therapy (3-4 days)      -  Aztreonam: S <=4      -  Cefazolin: R >16      -  Cefepime: S <=2      -  Cefoxitin: R >16      -  Ceftriaxone: R 32 Enterobacter, Klebsiella aerogenes, Citrobacter, and Serratia may develop resistance during prolonged therapy      -  Ciprofloxacin: S <=0.25      -  Ertapenem: S <=0.5      -  Gentamicin: S <=2      -  Imipenem: S <=1      -  Levofloxacin: S <=0.5      -  Meropenem: S <=1      -  Nitrofurantoin: S <=32 Should not be used to treat pyelonephritis      -  Piperacillin/Tazobactam: S 16      -  Tigecycline: S <=2      -  Tobramycin: S <=2      -  Trimethoprim/Sulfamethoxazole: S <=0.5/9.5      Method Type: PATRICIA    Culture - Urine (collected 09 Jun 2022 21:24)  Source: Clean Catch Clean Catch (Midstream)  Final Report (10 Flakito 2022 19:32):    No growth    [facility] (06-09-22 @ 20:56)    ___________________________________________________________________________________  ===============>>  A S S E S S M E N T   A N D   P L A N <<===============  ------------------------------------------------------------------------------------------    · Assessment	  56F from home, w/ PMH Anemia, asthma, bladder CA (on Gemzar and Carboplatin), R Nephrostomy tube, solitary kidney, HTN not on meds, HLD, Parathyroid tumor came in for chills x2 days admitted for UTI    Problem/Plan - 1:  ·  Problem: complex UTI / possible pyelo  in pt with R nephrostomy tube and kidney stones ( unclear if infected)      CT AP with nonobstructing calculus in R lower pole     resistant to rocephin      Pt rsistant to many antibiotics including cefepim and cipro : agreed to bactrim  >> will give until tube is changed   pain control - tylenol prn  IR follow up for tube change >> seems to be for Wednesday with aesthesia , in the morning     Problem/Plan - 2:  ·  Problem: Anemia.      likely AOCD in pt with bladder ca  monitor cbc.    Problem/Plan - 3:  ·  Problem: Bladder cancer.   ·  Plan: on chemotherapy as outpt.    Problem/Plan - 4:  ·  Problem: HTN (hypertension).   ·  Plan: not on meds  monitor bp.  pt has PPM     Problem/Plan - 5:  ·  Problem: Prophylactic measure.   ·  Plan: SCDs for now as pt with prior hematuria    --------------------------------------------  Case discussed with pt in detail   Education given on findings and plan of care    Today I had a prolonged conversation with the patient, JEF LACY, re diagnosis, findings, and plan of care, options, alternatives, prognosis... . Patient verbalized clear understanding, all questions answered.    patient seen according to [facility] cost code ( 92194 )     Additional time spent  35 min.  ___________________________  H. NIEVES Ochoa.  Pager: [phone]    
seen and examined   covering for dR cannon    bladder ca with rt nephrostomy tube   cam ewith chills fever  urine cxs showed  urine cxs enterobactor claoce    awaiting for replacement of Nstomy tube  needs iv antibxs for UTI
Patient is a 56F from home, w/ PMH Anemia, asthma, bladder CA (on Gemzar and Carboplatin), R Nephrostomy tube, solitary kidney, HTN not on meds, HLD, Parathyroid tumor came in for chills x2 days. Pt presented to the ED as she felt like she has a UTI. Pt states that she had chills last time she had a UTI. Pt states that she is supposed to have her nephrostomy tube changed by Sirisha in IR this June. CT A/P shows chronic left hydronephrosis. UA positive with no leukocytosis. Patient was started on Rocephin Urine culture sent. Patient was admitted to medicine for UTI, planned for IR nephrostomy tube exchange. IR unable to take pt today for nephrostomy exchange because patient had breakfast already & personally demands anesthesia in the past for exchange. Patient planned for nephrostomy exchange on Monday 6/13.

## 2022-06-13 NOTE — DISCHARGE NOTE PROVIDER - CARE PROVIDER_API CALL
Priscila Rosario  [address]  [id number]  Phone: [phone]  Fax: (   )    -  Follow Up Time: 1 week

## 2022-06-13 NOTE — DISCHARGE NOTE NURSING/CASE MANAGEMENT/SOCIAL WORK - PATIENT PORTAL LINK FT
You can access the FollowMyHealth Patient Portal offered by [facility] by registering at the following website: [email or web address]. By joining [facility]’s FollowMyHealth portal, you will also be able to view your health information using other applications (apps) compatible with our system.

## 2022-06-13 NOTE — ED ADULT NURSE NOTE - MUSCULOSKELETAL ASSESSMENT
Pt in the office to  HST monitor. Pt was educated on how to use equipment properly and instructed to wear for 2 nights and to return on Wednesday. Pt states he understood. - - -

## 2022-06-13 NOTE — DISCHARGE NOTE PROVIDER - HOSPITAL COURSE
Patient is a 56F from home, w/ PMH Anemia, asthma, bladder CA (on Gemzar and Carboplatin), R Nephrostomy tube, solitary kidney, HTN not on meds, HLD,   Parathyroid tumor came in for chills x2 days. Pt presented to the ED as she felt like she has a UTI. Pt states that she had chills last time she had a UTI.   Pt was supposed to have her nephrostomy tube changed by Sirisha in IR this June. Patient was admitted to medicine for UTI. CT A/P shows chronic left hydronephrosis, & non obstructive   nephrolithiasis. UA positive with no leukocytosis. Patient was started on Rocephin Urine culture sent. Urine culture grew Enterobacter UTI. Antibiotics switched to Bactrim IV with Sensitivity resulted.  Patient was planned for IR nephrostomy tube exchange but however demands anesthesia  for exchange. However, IR unable to take pt  for   nephrostomy exchange on 6/10 because of patient's concern for prior hypoglycemic events while NPO  ( and even when not NPO) and does not want to be on D5 drip too long due to history of cancer. Patient is medically stable for discharge, & to follow up on Monday 6/20 for out patient nephrostomy exchange with IR.     INCOMPLETE    INCOMPLETE    Please note that this is a brief summary of hospital course. Please refer to daily progress notes & consult notes for full course & events.         Patient is a 56F from home, w/ PMH Anemia, asthma, bladder CA (on Gemzar and Carboplatin), R Nephrostomy tube, solitary kidney, HTN not on meds, HLD,   Parathyroid tumor came in for chills x2 days. Pt presented to the ED as she felt like she has a UTI. Pt states that she had chills last time she had a UTI.   Pt was supposed to have her nephrostomy tube changed by Sirisha in IR this June. Patient was admitted to medicine for UTI. CT A/P shows chronic left hydronephrosis, & non obstructive   nephrolithiasis. UA positive with no leukocytosis. Patient was started on Rocephin Urine culture sent. Urine culture grew Enterobacter UTI. Antibiotics switched to Bactrim IV with Sensitivity resulted.  Patient was planned for IR nephrostomy tube exchange but however demands anesthesia  for exchange. However, IR unable to take pt  for   nephrostomy exchange on 6/10 because of patient's concern for prior hypoglycemic events while NPO  ( and even when not NPO) and does not want to be on D5 drip too long due to history of cancer. Patient is medically stable for discharge, & to follow up on Monday 6/20 for out patient nephrostomy exchange with IR.   Please note that this is a brief summary of hospital course. Please refer to daily progress notes & consult notes for full course & events.

## 2022-06-13 NOTE — DISCHARGE NOTE NURSING/CASE MANAGEMENT/SOCIAL WORK - NSDCPEFALRISK_GEN_ALL_CORE
For information on Fall & Injury Prevention, visit: [email or web address] OR  [email or web address] OR  https://www.cdc.gov/steadi/patient.html

## 2022-06-13 NOTE — DISCHARGE NOTE PROVIDER - NSDCCAREPROVSEEN_GEN_ALL_CORE_FT
Candelario Ochoa Respiratory failure Candelario Simon  User ADM  [facility]

## 2022-06-20 NOTE — ASU DISCHARGE PLAN (ADULT/PEDIATRIC) - NS MD DC FALL RISK RISK
For information on Fall & Injury Prevention, visit: [email or web address] OR  [email or web address] OR  https://www.cdc.gov/steadi/patient.html

## 2022-06-20 NOTE — PROCEDURE NOTE - PROCEDURE FINDINGS AND DETAILS
Right sided 10.2 [place] PCN exchanged over a guidewire via existing tract.  Tube felt clogged on initial flush, but then opened up.  Difficult passage of wire (Amplatz) then used Nitrix, which advanced.

## 2022-06-20 NOTE — ASU DISCHARGE PLAN (ADULT/PEDIATRIC) - CONDITION AT DISCHARGE
Stable
I have personally seen and examined this patient.  I have fully participated in the care of this patient. I have reviewed all pertinent clinical information, including history, physical exam, plan and the Resident’s note and agree except as noted.

## 2022-06-27 NOTE — H&P ADULT - PROBLEM SELECTOR PLAN 1
Pt p/w hematuria and abdominal pain for a week  Has a right nephrostomy tube replaced a week ago  Last admission in [place] [facility], Ucx positive for ceftriaxone resistant Enterobacter cloacae  Vitals stable  Physical exam has left lower abdominal tenderness  Hemoglobin stable 10.8 (baseline around 11)  Urinalysis positive for UTI  started on Zosyn based on prev cultures  started on IVF  follow up cultures Pt p/w hematuria and abdominal pain for a week  Has a right nephrostomy tube replaced a week ago  Last admission in [place] [facility], Ucx positive for ceftriaxone resistant Enterobacter cloacae  Vitals stable  Physical exam has left lower abdominal tenderness  Hemoglobin stable 10.8 (baseline around 11)  Urinalysis positive for UTI  Initially started on Zosyn (pt refused and wants to wait for the next cultures to get the most appropriate antibiotic)  for now Pt is comfortable only with Ceftriaxone  started on IVF  follow up cultures Pt p/w hematuria and abdominal pain for a week  Has a right nephrostomy tube replaced a week ago  Last admission in [place] [facility], Ucx positive for ceftriaxone resistant Enterobacter cloacae  Vitals stable  Physical exam has left lower abdominal tenderness  Hemoglobin stable 10.8 (baseline around 11)  Urinalysis positive for UTI  Initially started on Zosyn (pt refused and wants to wait for the next cultures to get the most appropriate antibiotic)  for now Pt is comfortable only with Ceftriaxone  started on IVF  follow up cultures  ID Dr. Rodriguez consulted

## 2022-06-27 NOTE — ED ADULT NURSE NOTE - NSIMPLEMENTINTERV_GEN_ALL_ED
Implemented All Fall Risk Interventions:  [place] to call system. Call bell, personal items and telephone within reach. Instruct patient to call for assistance. Room bathroom lighting operational. Non-slip footwear when patient is off stretcher. Physically safe environment: no spills, clutter or unnecessary equipment. Stretcher in lowest position, wheels locked, appropriate side rails in place. Provide visual cue, wrist band, yellow gown, etc. Monitor gait and stability. Monitor for mental status changes and reorient to person, place, and time. Review medications for side effects contributing to fall risk. Reinforce activity limits and safety measures with patient and family.

## 2022-06-27 NOTE — ED PROVIDER NOTE - PROGRESS NOTE DETAILS
Patient ua +, trop negative. patient endorses feeling generalized weakness, admitted for ivabx, attempted to call dr horner for readmission, no call back, admitted to dr sneed for uti, cp.

## 2022-06-27 NOTE — ED ADULT NURSE NOTE - OBJECTIVE STATEMENT
pt complaining of chest, back, and eye pain. Pain for 3 days already. left side of chest in pain, the whole back, and the left eye. the pain comes and goes and is sharp. pt states she is urinating blood.

## 2022-06-27 NOTE — H&P ADULT - PROBLEM SELECTOR PLAN 2
Pt had left chest pain today  Asymptomatic now  EKG showed no ST changes  Trops x 2 negative  Follows up Cardiology Dr. Rosario as outpatient

## 2022-06-27 NOTE — ED PROVIDER NOTE - OBJECTIVE STATEMENT
56 y.o w/ PMH Anemia, asthma, bladder CA (on Gemzar and Carboplatin), R Nephrostomy tube, solitary kidney, HTN not on meds, HLD, Parathyroid tumor presenting with hematuria x several days. endorses similar in the past 2/2 uti. patient noting left sided cp x 2 days. denies n, v. endorses noting fever today. patient had nephrostomy tube changed 1 week ago.

## 2022-06-27 NOTE — H&P ADULT - HISTORY OF PRESENT ILLNESS
56 y.o w/ PMH Anemia, asthma, bladder CA (on Gemzar and Carboplatin), R Nephrostomy tube, solitary kidney, HTN not on meds, HLD, Parathyroid tumor presenting with hematuria x several days. endorses similar in the past 2/2 uti. patient noting left sided cp x 2 days. denies n, v. endorses noting fever today. patient had nephrostomy tube changed 1 week ago.  Pt is a 57 yo M from home with PMH of Anemia, asthma, bladder CA (not on Chemo from May'22), R Nephrostomy tube, solitary kidney presenting with hematuria and abdominal pain for a week. endorses similar in the past 2/2 uti. patient noting left sided cp x 2 days. denies n, v. endorses noting fever today. patient had nephrostomy tube changed 1 week ago.  Pt is a 57 yo F from home with PMH of Anemia, asthma, bladder CA (not on Chemo from May'22), R Nephrostomy tube, solitary kidney presenting with hematuria and abdominal pain for a week. endorses similar in the past 2/2 uti. patient noting left sided cp x 2 days. denies n, v. endorses noting fever today. patient had nephrostomy tube changed 1 week ago.

## 2022-06-27 NOTE — H&P ADULT - ASSESSMENT
56 y.o w/ PMH Anemia, asthma, bladder CA (on Gemzar and Carboplatin), R Nephrostomy tube, solitary kidney, HTN not on meds, HLD, Parathyroid tumor presenting with hematuria and abdominal pain for a week. 56 y.o F w/ PMH Anemia, asthma, bladder CA (on Gemzar and Carboplatin), R Nephrostomy tube, solitary kidney, HTN not on meds, HLD, Parathyroid tumor presenting with hematuria and abdominal pain for a week.

## 2022-06-27 NOTE — H&P ADULT - PROBLEM SELECTOR PLAN 3
Pt has h/o bladder CA  Last Chemo was in May (was on Gemzar and Carboplatin)  follows up with [facility]

## 2022-06-27 NOTE — ED PROVIDER NOTE - CLINICAL SUMMARY MEDICAL DECISION MAKING FREE TEXT BOX
Patient presenting with cp and dysuria, hematuria. vital stable. will obtain lab, ua, cxr. ed obs and reassess

## 2022-06-27 NOTE — ED ADULT NURSE NOTE - NS ED NURSE LEVEL OF CONSCIOUSNESS SPEECH
ASSESSMENT-  --Periumbilical to generalized intermittent abdominal pains --likely cramping due to constipation        PLAN-  For the abdominal pain--  Discussed differential diagnosis of generalized intermittent abdominal pain--such as colitis, irritable bowel, constipation, food intolerance etc.  Discussed surgical and non surgical causes. For now will treat with observing and education.      Mom is to look at her stool. The xray done today shows constipation.   Recommend using miralax as directed. Can use daily for few days but then taper to every other day to once a week gradually as improves.  WATCH DIET. Avoid dairy and push fluids.  As she is able to evacuate more stool then can add back solids.  Miralax will work better if using a clear liquid and very easy to digest foods.    Keep a food log, stool and a pain log-- as this could be a food sensitivity etc.   Recheck with your doctor in 7-10 days to discuss log and to discuss how your symptoms are progressing on suggestions made during this visit.     But strongly advised if any worsening of abdominal pain or onset of new symptoms such as fevers, vomiting then must recheck as patient may need CT imaging, labs etc..  If onset of other symptoms of concern such as chest pain, shortness of breath, arm pain, jaw pain, back pain then must go directly to ER.       
Speaking Coherently

## 2022-06-27 NOTE — H&P ADULT - NSHPPHYSICALEXAM_GEN_ALL_CORE
Vital Signs Last 24 Hrs  T(C): 37 (27 Jun 2022 14:16), Max: 37 (27 Jun 2022 14:16)  T(F): 98.6 (27 Jun 2022 14:16), Max: 98.6 (27 Jun 2022 14:16)  HR: 80 (27 Jun 2022 14:16) (76 - 80)  BP: 124/76 (27 Jun 2022 14:16) (124/76 - 145/80)  RR: 18 (27 Jun 2022 14:16) (18 - 18)    GENERAL: NAD, lying in bed comfortably  HEAD:  Atraumatic, Normocephalic  EYES: EOMI, PERRLA, conjunctiva and sclera clear  ENT: Moist mucous membranes  NECK: Supple, No JVD  CHEST/LUNG: Clear to auscultation bilaterally; No rales, rhonchi, wheezing, or rubs. Unlabored respirations  HEART: Regular rate and rhythm; No murmurs, rubs, or gallops  ABDOMEN: Bowel sounds present; Soft, Nontender, Nondistended. No hepatomegaly  EXTREMITIES:  2+ Peripheral Pulses, brisk capillary refill. No clubbing, cyanosis, or edema  NERVOUS SYSTEM:  Alert & Oriented X3, speech clear. No deficits   MSK: FROM all 4 extremities, full and equal strength  SKIN: No rashes or lesions

## 2022-06-28 NOTE — CONSULT NOTE ADULT - ASSESSMENT
Patient is a 56y old  Female with PMH of Anemia, asthma, bladder CA (not on Chemo from May'22), Right Nephrostomy tube,  changed 1 week ago. now presents to the ER for evaluation of hematuria and abdominal pain for a week.  On admission, she found to have no fever, BUT positive Urine analysis. She has started on Ceftriaxone and the ID consult requested to assist with further evaluation and antibiotic management.     # Complicated UTI - in the setting of Right nephrostomy tube- s/p changed in 6/20  # H/O recent Enterobacter UTI- 6/10/22    would recommend:    1. Follow up Urine culture  2. Please change Ceftriaxone to Cefepime since previous isolates is resistant to Ceftriaxone  3. Monitor kidney function and adjust Abx doses accordingly  4. Pain management as needed    will follow the patient with you and make further recommendation based on the clinical course and Lab results  Thank you for the opportunity to participate in Ms. HOUSE's care    Attending Attestation:    Spent more than 65 minutes on total encounter, more than 50 % of the visit was spent counseling and/or coordinating care by the Attending physician.

## 2022-06-28 NOTE — PROGRESS NOTE ADULT - ASSESSMENT
Pt is a 57 yo F from home with PMH of Anemia, asthma, bladder CA (not on Chemo from May'22), R Nephrostomy tube, solitary kidney presenting with hematuria and abdominal pain for a week. endorses similar in the past 2/2 uti. patient noting left sided cp x 2 days. In ED trop negative x2, no acute changes on EKG. Had nephrostomy tube recently changed 6/20/22. Presented to ED with c/o right flank pain. Admitted for positive UTI with hematuria which has now resolved. Dr. Rodriguez (ID) following, started on Cefepime 1g daily. Patient continues to c/o Right flank pain, endorses concern for kidney stone (has hx of). IF symptoms do not resolve by am, patient agrees for CT abd to eval r/o kidney stones.

## 2022-06-28 NOTE — PATIENT PROFILE ADULT - NSPROMEDDEVPACEMAKER_GEN_A_NUR
Subjective:       Patient ID: Carlos Rivera is a 33 y.o. male.    Chief Complaint: Headache    Headache    This is a new problem. The current episode started 1 to 4 weeks ago. The pain is located in the occipital, bilateral and retro-orbital region. The pain does not radiate. The quality of the pain is described as throbbing. Associated symptoms include neck pain, phonophobia and photophobia. Pertinent negatives include no blurred vision, hearing loss, loss of balance, nausea, rhinorrhea, scalp tenderness, tinnitus, vomiting or weakness. The symptoms are aggravated by bright light and noise. He has tried NSAIDs and Excedrin for the symptoms. The treatment provided mild relief. His past medical history is significant for obesity. There is no history of cluster headaches, hypertension, migraine headaches, migraines in the family or recent head traumas.       Patient Active Problem List   Diagnosis    Anxiety    Hypochromic microcytic anemia    Allergic rhinitis    History of migraine    Class 2 obesity due to excess calories without serious comorbidity with body mass index (BMI) of 39.0 to 39.9 in adult    Hyperinsulinemia    Keratosis pilaris    Snoring       Current Outpatient Medications:     butalbital-acetaminophen-caffeine -40 mg (FIORICET, ESGIC) -40 mg per tablet, Take 1 tablet by mouth every 6 (six) hours as needed for Headaches., Disp: 20 tablet, Rfl: 0    The following portions of the patient's history were reviewed and updated as appropriate: allergies, past family history, past medical history, past social history and past surgical history.    Review of Systems   Constitutional: Positive for unexpected weight change. Negative for activity change.   HENT: Negative for hearing loss, rhinorrhea, tinnitus and trouble swallowing.    Eyes: Positive for photophobia. Negative for blurred vision, discharge and visual disturbance.   Respiratory: Negative for chest tightness and wheezing.   "  Cardiovascular: Negative for chest pain and palpitations.   Gastrointestinal: Negative for blood in stool, constipation, diarrhea, nausea and vomiting.   Endocrine: Negative for polydipsia and polyuria.   Genitourinary: Negative for difficulty urinating, hematuria and urgency.   Musculoskeletal: Positive for neck pain. Negative for arthralgias and joint swelling.   Neurological: Positive for headaches. Negative for weakness and loss of balance.   Psychiatric/Behavioral: Negative for confusion, dysphoric mood and sleep disturbance (decreased TST; snores).       Objective:      /72   Pulse 86   Ht 5' 11" (1.803 m)   Wt 128.4 kg (283 lb)   SpO2 97%   BMI 39.47 kg/m²     Physical Exam   Constitutional: He is oriented to person, place, and time. He appears well-developed and well-nourished. He is cooperative.   HENT:   Head: Normocephalic and atraumatic.   Right Ear: External ear normal.   Left Ear: External ear normal.   Nose: Nose normal.   Mouth/Throat: Oropharynx is clear and moist and mucous membranes are normal. No oropharyngeal exudate.   Eyes: Pupils are equal, round, and reactive to light. Conjunctivae, EOM and lids are normal. No scleral icterus.   Fundoscopic exam:       The right eye shows no hemorrhage and no papilledema.        The left eye shows no hemorrhage and no papilledema.   Neck: Neck supple. No JVD present. Carotid bruit is not present. No thyromegaly present.   Cardiovascular: Normal rate, regular rhythm, normal heart sounds and normal pulses. Exam reveals no gallop and no friction rub.   No murmur heard.  Pulmonary/Chest: Effort normal and breath sounds normal. He has no wheezes. He has no rhonchi. He has no rales.   Abdominal: Soft. Bowel sounds are normal. He exhibits no distension and no mass. There is no splenomegaly or hepatomegaly. There is no tenderness.   Musculoskeletal: Normal range of motion. He exhibits no edema or tenderness.        Cervical back: He exhibits normal " "range of motion, no tenderness, no pain and no spasm.   Lymphadenopathy:     He has no cervical adenopathy.     He has no axillary adenopathy.   Neurological: He is alert and oriented to person, place, and time. He has normal strength and normal reflexes. No cranial nerve deficit or sensory deficit. Coordination normal.   Skin: Skin is warm and dry.   Psychiatric: He has a normal mood and affect.   Vitals reviewed.      Assessment:       1. Tension vascular headache    2. Weight gain    3. Thyromegaly    4. Snoring          Plan:       Offered influenza vaccine.    Discussed HIV screening.    Remainder of Health Maintenance reviewed - up to date.    HA's with migrainous symptoms.  With weight gain/exam findings, thyroid ds?    Labs (see Orders)     Orders Placed This Encounter    Basic metabolic panel    TSH    T4, free    butalbital-acetaminophen-caffeine -40 mg (FIORICET, ESGIC) -40 mg per tablet     Further recommendations to follow after above.        "This note will not be shared with the patient."  " left PPM

## 2022-06-28 NOTE — PROGRESS NOTE ADULT - SUBJECTIVE AND OBJECTIVE BOX
NP note    HPI:  Pt is a 55 yo F from home with PMH of Anemia, asthma, bladder CA (not on Chemo from May'22), R Nephrostomy tube, solitary kidney presenting with hematuria and abdominal pain for a week. endorses similar in the past 2/2 uti. patient noting left sided cp x 2 days. denies n, v. endorses noting fever today. patient had nephrostomy tube changed 1 week ago.  (2022 21:32)      Patient is a 56y old  Female who presents with a chief complaint of UTI and chest pain (2022 13:26)      INTERVAL HPI/OVERNIGHT EVENTS:  T(C): 36.8 (22 @ 15:47), Max: 36.8 (22 @ 15:47)  HR: 60 (22 @ 15:47) (60 - 73)  BP: 118/51 (22 @ 15:47) (109/61 - 135/78)  RR: 17 (22 @ 15:47) (17 - 18)  SpO2: 98% (22 @ 15:47) (98% - 100%)  Wt(kg): --  I&O's Summary      REVIEW OF SYSTEMS: denies fever, chills, SOB, palpitations, chest pain, abdominal pain, nausea, vomitting, diarrhea, constipation, dizziness    MEDICATIONS  (STANDING):  atorvastatin 40 milliGRAM(s) Oral at bedtime  budesonide  80 MICROgram(s)/formoterol 4.5 MICROgram(s) Inhaler 2 Puff(s) Inhalation two times a day  cefepime   IVPB      cefepime   IVPB 1000 milliGRAM(s) IV Intermittent every 8 hours  cholecalciferol 1000 Unit(s) Oral daily  dextrose 5%. 1000 milliLiter(s) (50 mL/Hr) IV Continuous <Continuous>  dextrose 5%. 1000 milliLiter(s) (100 mL/Hr) IV Continuous <Continuous>  dextrose 50% Injectable 25 Gram(s) IV Push once  dextrose 50% Injectable 12.5 Gram(s) IV Push once  dextrose 50% Injectable 25 Gram(s) IV Push once  glucagon  Injectable 1 milliGRAM(s) IntraMuscular once  insulin lispro (ADMELOG) corrective regimen sliding scale   SubCutaneous three times a day before meals  insulin lispro (ADMELOG) corrective regimen sliding scale   SubCutaneous at bedtime  OLANZapine 5 milliGRAM(s) Oral daily  sodium chloride 0.9%. 1000 milliLiter(s) (60 mL/Hr) IV Continuous <Continuous>    MEDICATIONS  (PRN):  acetaminophen     Tablet .. 650 milliGRAM(s) Oral every 6 hours PRN Temp greater or equal to 38C (100.4F), Mild Pain (1 - 3), Moderate Pain (4 - 6)  dextrose Oral Gel 15 Gram(s) Oral once PRN Blood Glucose LESS THAN 70 milliGRAM(s)/deciliter  ondansetron    Tablet 4 milliGRAM(s) Oral two times a day PRN Nausea      PHYSICAL EXAM:  GENERAL: NAD, well-groomed, well-developed  HEAD:  Atraumatic, Normocephalic  EYES: EOMI, PERRLA, conjunctiva and sclera clear  ENMT: No tonsillar erythema, exudates, or enlargement; Moist mucous membranes, Good dentition, No lesions  NECK: Supple, No JVD, Normal thyroid  NERVOUS SYSTEM:  Alert & Oriented X3, Good concentration; Motor Strength 5/5 B/L upper and lower extremities; DTRs 2+ intact and symmetric  CHEST/LUNG: Clear to percussion bilaterally; No rales, rhonchi, wheezing, or rubs  HEART: Regular rate and rhythm; No murmurs, rubs, or gallops  ABDOMEN: Soft, Nontender, Nondistended; Bowel sounds present  EXTREMITIES:  2+ Peripheral Pulses, No clubbing, cyanosis, or edema  LYMPH: No lymphadenopathy noted  SKIN: No rashes or lesions  LABS:                        11.2   6.25  )-----------( 295      ( 2022 05:37 )             34.5     -    139  |  105  |  20<H>  ----------------------------<  92  3.9   |  27  |  1.09    Ca    9.7      2022 05:37  Phos  3.3       Mg     2.2         TPro  7.6  /  Alb  3.0<L>  /  TBili  0.3  /  DBili  x   /  AST  13  /  ALT  16  /  AlkPhos  95      PT/INR - ( 2022 14:43 )   PT: 13.1 sec;   INR: 1.10 ratio         PTT - ( 2022 14:43 )  PTT:28.9 sec  Urinalysis Basic - ( 2022 16:34 )    Color: Yellow / Appearance: Slightly Turbid / S.015 / pH: x  Gluc: x / Ketone: Small  / Bili: Negative / Urobili: Negative   Blood: x / Protein: 500 mg/dL / Nitrite: Negative   Leuk Esterase: Small / RBC: 10-25 /HPF / WBC 6-10 /HPF   Sq Epi: x / Non Sq Epi: Moderate /HPF / Bacteria: Moderate /HPF      CAPILLARY BLOOD GLUCOSE      POCT Blood Glucose.: 64 mg/dL (2022 12:47)  POCT Blood Glucose.: 88 mg/dL (2022 19:25)        Urinalysis Basic - ( 2022 16:34 )    Color: Yellow / Appearance: Slightly Turbid / S.015 / pH: x  Gluc: x / Ketone: Small  / Bili: Negative / Urobili: Negative   Blood: x / Protein: 500 mg/dL / Nitrite: Negative   Leuk Esterase: Small / RBC: 10-25 /HPF / WBC 6-10 /HPF   Sq Epi: x / Non Sq Epi: Moderate /HPF / Bacteria: Moderate /HPF     NP note    Patient is a 56y old  Female who presents with a chief complaint of UTI and chest pain. Endorses right flank pain. Denies chest pain, sob, palpitations.       INTERVAL HPI/OVERNIGHT EVENTS:  No overnight events    Physical Exam:     General: No distress. Comfortable.  HEENT: EOM intact.  Neck: Neck supple. JVP not elevated. No masses  Chest: Clear to auscultation bilaterally  CV: s1 s2 RRR no murmurs, rubs or gallops   Abdomen: Soft, non-distended, non-tender  Extremity : + PP no c/c/e   Skin: No rashes or skin breakdown  Neurology: Alert and oriented times three. Sensation intact  Psych: Affect normal    T(C): 36.8 (22 @ 15:47), Max: 36.8 (22 @ 15:47)  HR: 60 (22 @ 15:47) (60 - 73)  BP: 118/51 (22 @ 15:47) (109/61 - 135/78)  RR: 17 (22 @ 15:47) (17 - 18)  SpO2: 98% (22 @ 15:47) (98% - 100%)  Wt(kg): --  I&O's Summary      REVIEW OF SYSTEMS: denies fever, chills, SOB, palpitations, chest pain, abdominal pain, nausea, vomitting, diarrhea, constipation, dizziness    MEDICATIONS  (STANDING):  atorvastatin 40 milliGRAM(s) Oral at bedtime  budesonide  80 MICROgram(s)/formoterol 4.5 MICROgram(s) Inhaler 2 Puff(s) Inhalation two times a day  cefepime   IVPB      cefepime   IVPB 1000 milliGRAM(s) IV Intermittent every 8 hours  cholecalciferol 1000 Unit(s) Oral daily  dextrose 5%. 1000 milliLiter(s) (50 mL/Hr) IV Continuous <Continuous>  dextrose 5%. 1000 milliLiter(s) (100 mL/Hr) IV Continuous <Continuous>  dextrose 50% Injectable 25 Gram(s) IV Push once  dextrose 50% Injectable 12.5 Gram(s) IV Push once  dextrose 50% Injectable 25 Gram(s) IV Push once  glucagon  Injectable 1 milliGRAM(s) IntraMuscular once  insulin lispro (ADMELOG) corrective regimen sliding scale   SubCutaneous three times a day before meals  insulin lispro (ADMELOG) corrective regimen sliding scale   SubCutaneous at bedtime  OLANZapine 5 milliGRAM(s) Oral daily  sodium chloride 0.9%. 1000 milliLiter(s) (60 mL/Hr) IV Continuous <Continuous>    MEDICATIONS  (PRN):  acetaminophen     Tablet .. 650 milliGRAM(s) Oral every 6 hours PRN Temp greater or equal to 38C (100.4F), Mild Pain (1 - 3), Moderate Pain (4 - 6)  dextrose Oral Gel 15 Gram(s) Oral once PRN Blood Glucose LESS THAN 70 milliGRAM(s)/deciliter  ondansetron    Tablet 4 milliGRAM(s) Oral two times a day PRN Nausea      PHYSICAL EXAM:  GENERAL: NAD, well-groomed, well-developed  HEAD:  Atraumatic, Normocephalic  EYES: EOMI, PERRLA, conjunctiva and sclera clear  ENMT: No tonsillar erythema, exudates, or enlargement; Moist mucous membranes, Good dentition, No lesions  NECK: Supple, No JVD, Normal thyroid  NERVOUS SYSTEM:  Alert & Oriented X3, Good concentration; Motor Strength 5/5 B/L upper and lower extremities; DTRs 2+ intact and symmetric  CHEST/LUNG: Clear to percussion bilaterally; No rales, rhonchi, wheezing, or rubs  HEART: Regular rate and rhythm; No murmurs, rubs, or gallops  ABDOMEN: Soft, Nontender, Nondistended; Bowel sounds present  EXTREMITIES:  2+ Peripheral Pulses, No clubbing, cyanosis, or edema  LYMPH: No lymphadenopathy noted  SKIN: No rashes or lesions  LABS:                        11.2   6.25  )-----------( 295      ( 2022 05:37 )             34.5         139  |  105  |  20<H>  ----------------------------<  92  3.9   |  27  |  1.09    Ca    9.7      2022 05:37  Phos  3.3       Mg     2.2         TPro  7.6  /  Alb  3.0<L>  /  TBili  0.3  /  DBili  x   /  AST  13  /  ALT  16  /  AlkPhos  95      PT/INR - ( 2022 14:43 )   PT: 13.1 sec;   INR: 1.10 ratio         PTT - ( 2022 14:43 )  PTT:28.9 sec  Urinalysis Basic - ( 2022 16:34 )    Color: Yellow / Appearance: Slightly Turbid / S.015 / pH: x  Gluc: x / Ketone: Small  / Bili: Negative / Urobili: Negative   Blood: x / Protein: 500 mg/dL / Nitrite: Negative   Leuk Esterase: Small / RBC: 10-25 /HPF / WBC 6-10 /HPF   Sq Epi: x / Non Sq Epi: Moderate /HPF / Bacteria: Moderate /HPF      CAPILLARY BLOOD GLUCOSE      POCT Blood Glucose.: 64 mg/dL (2022 12:47)  POCT Blood Glucose.: 88 mg/dL (2022 19:25)        Urinalysis Basic - ( 2022 16:34 )    Color: Yellow / Appearance: Slightly Turbid / S.015 / pH: x  Gluc: x / Ketone: Small  / Bili: Negative / Urobili: Negative   Blood: x / Protein: 500 mg/dL / Nitrite: Negative   Leuk Esterase: Small / RBC: 10-25 /HPF / WBC 6-10 /HPF   Sq Epi: x / Non Sq Epi: Moderate /HPF / Bacteria: Moderate /HPF

## 2022-06-28 NOTE — PROGRESS NOTE ADULT - PROBLEM SELECTOR PLAN 1
p/w hematuria and abdominal pain for a week  Has a right nephrostomy tube replaced a week ago 6/20/22  Last admission in [facility], Ucx positive for ceftriaxone resistant Enterobacter cloacae  Hemoglobin stable 10.8 (baseline around 11)  Urinalysis positive for UTI now on Cefepime   follow up culture  ID Dr. Rodriguez following

## 2022-06-28 NOTE — CONSULT NOTE ADULT - SUBJECTIVE AND OBJECTIVE BOX
Patient is a 56y old  Female with PMH of Anemia, asthma, bladder CA (not on Chemo from May'22), Right Nephrostomy tube,  changed 1 week ago. now presents to the ER for evaluation of hematuria and abdominal pain for a week.  On admission, she found to have no fever, BUT positive Urine analysis. She has started on Ceftriaxone and the ID consult requested to assist with further evaluation and antibiotic management.       REVIEW OF SYSTEMS: Total of twelve systems have been reviewed with patient and found to be negative unless mentioned in HPI      PAST MEDICAL & SURGICAL HISTORY:  Anemia  Asthma  HLD (hyperlipidemia)  Pacemaker, [employer]  Bladder cancer  HTN (hypertension)  Parathyroid tumor  Liver cyst  Hydronephrosis  has right Nephrostomy tube - dressing intact  Bradycardia  H/O myomectomy  Presence of cardiac pacemaker  H/O hydronephrosis  s/p Right Perc nephrostomy tube placement 2021, exchange 2021        SOCIAL HISTORY  Alcohol: Does not drink  Tobacco: Does not smoke  Illicit substance use: None      FAMILY HISTORY: Non contributory to the present illness        ALLERGIES: No Known Allergies        Vital Signs Last 24 Hrs  T(C): 36.7 (2022 11:30), Max: 37 (2022 14:16)  T(F): 98 (2022 11:30), Max: 98.6 (2022 14:16)  HR: 65 (2022 11:30) (61 - 80)  BP: 111/69 (2022 11:30) (109/61 - 145/80)  BP(mean): --  RR: 18 (2022 11:30) (17 - 18)  SpO2: 100% (2022 11:30) (99% - 100%)      PHYSICAL EXAM:  GENERAL: Not in distress   CHEST/LUNG:  Not using accessory muscles   HEART: s1 and s2 present  ABDOMEN:  Nontender and  Nondistended  : Right nephrostomy tube in placed   EXTREMITIES: No pedal  edema  CNS: Awake and Alert      LABS:                        11.2   6.25  )-----------( 295      ( 2022 05:37 )             34.5     06-28    139  |  105  |  20<H>  ----------------------------<  92  3.9   |  27  |  1.09    Ca    9.7      2022 05:37  Phos  3.3       Mg     2.2         TPro  7.6  /  Alb  3.0<L>  /  TBili  0.3  /  DBili  x   /  AST  13  /  ALT  16  /  AlkPhos  95      PT/INR - ( 2022 14:43 )   PT: 13.1 sec;   INR: 1.10 ratio      PTT - ( 2022 14:43 )  PTT:28.9 sec      CAPILLARY BLOOD GLUCOSE  POCT Blood Glucose.: 64 mg/dL (2022 12:47)  POCT Blood Glucose.: 88 mg/dL (2022 19:25)  POCT Blood Glucose.: 99 mg/dL (2022 14:28)        Urinalysis Basic - ( 2022 16:34 )  Color: Yellow / Appearance: Slightly Turbid / S.015 / pH: x  Gluc: x / Ketone: Small  / Bili: Negative / Urobili: Negative   Blood: x / Protein: 500 mg/dL / Nitrite: Negative   Leuk Esterase: Small / RBC: 10-25 /HPF / WBC 6-10 /HPF   Sq Epi: x / Non Sq Epi: Moderate /HPF / Bacteria: Moderate /HPF        MEDICATIONS  (STANDING):  atorvastatin 40 milliGRAM(s) Oral at bedtime  budesonide  80 MICROgram(s)/formoterol 4.5 MICROgram(s) Inhaler 2 Puff(s) Inhalation two times a day  cefTRIAXone   IVPB 1000 milliGRAM(s) IV Intermittent every 24 hours  cholecalciferol 1000 Unit(s) Oral daily  glucagon  Injectable 1 milliGRAM(s) IntraMuscular once  insulin lispro (ADMELOG) corrective regimen sliding scale   SubCutaneous three times a day before meals  insulin lispro (ADMELOG) corrective regimen sliding scale   SubCutaneous at bedtime  OLANZapine 5 milliGRAM(s) Oral daily  sodium chloride 0.9%. 1000 milliLiter(s) (60 mL/Hr) IV Continuous <Continuous>    MEDICATIONS  (PRN):  acetaminophen     Tablet .. 650 milliGRAM(s) Oral every 6 hours PRN Temp greater or equal to 38C (100.4F), Mild Pain (1 - 3), Moderate Pain (4 - 6)  dextrose Oral Gel 15 Gram(s) Oral once PRN Blood Glucose LESS THAN 70 milliGRAM(s)/deciliter  ondansetron    Tablet 4 milliGRAM(s) Oral two times a day PRN Nausea        RADIOLOGY & ADDITIONAL TESTS:    22 : Xray Chest 2 Views PA/Lat (22 @ 18:35):  Multiple bilateral pulmonary nodules again seen, for the   most part unchanged, although a 2.4 cm cavitary left lower lobe nodule is  larger, previously 2 cm.        MICROBIOLOGY DATA:    COVID-19 PCR . (22 @ 18:11)   COVID-19 PCR: NotDetec

## 2022-06-28 NOTE — PROGRESS NOTE ADULT - PROBLEM SELECTOR PLAN 2
resolved  negative troponin x2  no acute ischemic changes on EKG   if pt c/o chest pain, perform EKG

## 2022-06-29 NOTE — CONSULT NOTE ADULT - ASSESSMENT
Pt is a 57 yo F from home with PMH of Anemia, asthma, bladder CA (not on Chemo from May'22), R Nephrostomy tube, solitary kidney presenting with hematuria and abdominal pain for a week. endorses similar in the past 2/2 uti. patient noting left sided cp x 2 days. denies n, v. endorses noting fever today. patient had nephrostomy tube changed 1 week ago.   pt with bladder ca, who presented to er with c/o chest pain  pain reproducieable on palpation.?bone mets  pt had a recent stress test in the office will check  check urine culture  she was r/o for PE several times before

## 2022-06-29 NOTE — PROGRESS NOTE ADULT - ASSESSMENT
Pt is a 55 yo F from home with PMH of Anemia, asthma, bladder CA (not on Chemo from May'22), R Nephrostomy tube, solitary kidney presenting with hematuria and abdominal pain for a week. endorses similar in the past 2/2 uti. patient noting left sided cp x 2 days. In ED trop negative x2, no acute changes on EKG. Had nephrostomy tube recently changed 6/20/22. Presented to ED with c/o right flank pain. Admitted for positive UTI with hematuria which has now resolved. Dr. Rodriguez (ID) following, started on Cefepime 1g daily. Patient continues to c/o Right flank pain, endorses concern for kidney stone (has hx of). IF symptoms do not resolve by am, patient agrees for CT abd to eval r/o kidney stones.      Pt is a 55 yo F from home with PMH of Anemia, asthma, bladder CA (not on Chemo from May'22), R Nephrostomy tube, solitary kidney presenting with hematuria and abdominal pain for a week. endorses similar in the past 2/2 uti. patient noting left sided cp x 2 days. In ED trop negative x2, no acute changes on EKG. Had nephrostomy tube recently changed 6/20/22. Presented to ED with c/o right flank pain. Admitted for positive UTI with hematuria which has now resolved. Dr. Rodriguez (ID) following, started on Cefepime 1g daily. Patient continues to c/o Right flank pain, endorses concern for kidney stone (has hx of). IF symptoms do not resolve by am, patient agrees for CT abd to eval r/o kidney stones.     6/29-Persistent right flank pain radiating to left side as well.  CT A/P ordered, showed:  1. Right nephrostomy tube. No hydronephrosis.  2. Lung base pulmonary nodules, some of which are cavitary, likely   metastatic disease.  3. Right hepatic lesion (1.4 cm), suspicious for metastatic disease.  4. Diffuse intraluminal urinary bladder soft tissue consistent with   history of bladder cancer.         Pt is a 55 yo F from home with PMH of Anemia, asthma, bladder CA (not on Chemo from May'22), R Nephrostomy tube, solitary kidney presenting with hematuria and abdominal pain for a week. endorses similar in the past 2/2 uti. patient noting left sided cp x 2 days. In ED trop negative x2, no acute changes on EKG. Had nephrostomy tube recently changed 6/20/22. Presented to ED with c/o right flank pain. Admitted for positive UTI with hematuria which has now resolved. Dr. Rodriguez (ID) following, started on Cefepime 1g daily. Patient continues to c/o Right flank pain, endorses concern for kidney stone (has hx of). IF symptoms do not resolve by am, patient agrees for CT abd to eval r/o kidney stones.     6/29-Persistent right flank pain radiating to left side as well.  CT A/P ordered, showed:  1. Right nephrostomy tube. No hydronephrosis.  2. Lung base pulmonary nodules, some of which are cavitary, likely   metastatic disease.  3. Right hepatic lesion (1.4 cm), suspicious for metastatic disease.  4. Diffuse intraluminal urinary bladder soft tissue consistent with   history of bladder cancer.    Hem/onc [facility] has been following pt. on previous admissions, consulted ALEJANDRINA Camp, will see pt. tomorrow.

## 2022-06-29 NOTE — PROGRESS NOTE ADULT - ASSESSMENT
Patient is a 56y old  Female with PMH of Anemia, asthma, bladder CA (not on Chemo from May'22), Right Nephrostomy tube,  changed 1 week ago. now presents to the ER for evaluation of hematuria and abdominal pain for a week.  On admission, she found to have no fever, BUT positive Urine analysis. She has started on Ceftriaxone and the ID consult requested to assist with further evaluation and antibiotic management.     # Complicated UTI - in the setting of Right nephrostomy tube- s/p changed in 6/20  # H/O recent Enterobacter UTI- 6/10/22    would recommend:    1. Follow up Urine culture  2. Please change Ceftriaxone to Cefepime since previous isolates is resistant to Ceftriaxone  3. Monitor kidney function and adjust Abx doses accordingly  4. Pain management as needed    Attending Attestation:    Spent more than 45 minutes on total encounter, more than 50 % of the visit was spent counseling and/or coordinating care by the Attending physician.   Patient is a 56y old  Female with PMH of Anemia, asthma, bladder CA (not on Chemo from May'22), Right Nephrostomy tube,  changed 1 week ago. now presents to the ER for evaluation of hematuria and abdominal pain for a week.  On admission, she found to have no fever, BUT positive Urine analysis. She has started on Ceftriaxone and the ID consult requested to assist with further evaluation and antibiotic management.     # Complicated UTI - in the setting of Right nephrostomy tube- s/p changed in 6/20  # H/O recent Enterobacter UTI- 6/10/22    would recommend:    1. Follow up Urine culture, is in process   2. Please change Ceftriaxone to Cefepime since previous isolates is resistant to Ceftriaxone  3. Monitor kidney function and adjust Abx doses accordingly  4. Pain management as needed    Attending Attestation:    Spent more than 45 minutes on total encounter, more than 50 % of the visit was spent counseling and/or coordinating care by the Attending physician.

## 2022-06-29 NOTE — PROGRESS NOTE ADULT - PROBLEM SELECTOR PLAN 1
p/w hematuria and abdominal pain for a week  Has a right nephrostomy tube replaced a week ago 6/20/22  Last admission in [place] [facility], Ucx positive for ceftriaxone resistant Enterobacter cloacae  Hemoglobin stable 10.8 (baseline around 11)  Urinalysis positive for UTI now on Cefepime   follow up culture  ID Dr. Rodriguez following CT A/P shows Lung base pulmonary nodules, some of which are cavitary, likely   metastatic disease.  Right hepatic lesion (1.4 cm), suspicious for metastatic disease.  Diffuse intraluminal urinary bladder soft tissue consistent with history of bladder cancer.  -Hem/Onc [facility]MA consulted

## 2022-06-29 NOTE — CONSULT NOTE ADULT - SUBJECTIVE AND OBJECTIVE BOX
CHIEF COMPLAINT:Patient is a 56y old  Female who presents with a chief complaint of UTI and chest pain (29 Jun 2022 16:22)      HPI:  Pt is a 55 yo F from home with PMH of Anemia, asthma, bladder CA (not on Chemo from May'22), R Nephrostomy tube, solitary kidney presenting with hematuria and abdominal pain for a week. endorses similar in the past 2/2 uti. patient noting left sided cp x 2 days. denies n, v. endorses noting fever today. patient had nephrostomy tube changed 1 week ago.        PAST MEDICAL & SURGICAL HISTORY:  Anemia      Asthma      HLD (hyperlipidemia)      Pacemaker  [employer]      Bladder cancer      HTN (hypertension)      Parathyroid tumor      Liver cyst      Hydronephrosis  has right Nephrostomy tube - dressing intact      Bradycardia      H/O myomectomy      Presence of cardiac pacemaker      H/O hydronephrosis  s/p Right Perc nephrostomy tube placement 02/2021, exchange 06/2021          MEDICATIONS  (STANDING):  atorvastatin 40 milliGRAM(s) Oral at bedtime  budesonide  80 MICROgram(s)/formoterol 4.5 MICROgram(s) Inhaler 2 Puff(s) Inhalation two times a day  cefepime   IVPB      cefepime   IVPB 1000 milliGRAM(s) IV Intermittent every 8 hours  cholecalciferol 1000 Unit(s) Oral daily  dextrose 5%. 1000 milliLiter(s) (50 mL/Hr) IV Continuous <Continuous>  dextrose 5%. 1000 milliLiter(s) (100 mL/Hr) IV Continuous <Continuous>  dextrose 50% Injectable 25 Gram(s) IV Push once  dextrose 50% Injectable 12.5 Gram(s) IV Push once  dextrose 50% Injectable 25 Gram(s) IV Push once  glucagon  Injectable 1 milliGRAM(s) IntraMuscular once  insulin lispro (ADMELOG) corrective regimen sliding scale   SubCutaneous three times a day before meals  insulin lispro (ADMELOG) corrective regimen sliding scale   SubCutaneous at bedtime  OLANZapine 5 milliGRAM(s) Oral daily  sodium chloride 0.9%. 1000 milliLiter(s) (60 mL/Hr) IV Continuous <Continuous>    MEDICATIONS  (PRN):  acetaminophen     Tablet .. 650 milliGRAM(s) Oral every 6 hours PRN Temp greater or equal to 38C (100.4F), Mild Pain (1 - 3), Moderate Pain (4 - 6)  aluminum hydroxide/magnesium hydroxide/simethicone Suspension 30 milliLiter(s) Oral every 6 hours PRN Dyspepsia  dextrose Oral Gel 15 Gram(s) Oral once PRN Blood Glucose LESS THAN 70 milliGRAM(s)/deciliter  ondansetron    Tablet 4 milliGRAM(s) Oral two times a day PRN Nausea      FAMILY HISTORY:  Family history of prostate cancer (Father)    Family history of CHF (congestive heart failure) (Father)    Family history of atrial fibrillation (Father)        SOCIAL HISTORY:    [x ] Non-smoker  [ ] Smoker  [ ] Alcohol    Allergies    No Known Allergies    Intolerances    	    REVIEW OF SYSTEMS:  CONSTITUTIONAL: No fever, weight loss, or fatigue  EYES: No eye pain, visual disturbances, or discharge  ENT:  No difficulty hearing, tinnitus, vertigo; No sinus or throat pain  NECK: No pain or stiffness  RESPIRATORY: No cough, wheezing, chills or hemoptysis; No Shortness of Breath  CARDIOVASCULAR: No chest pain, palpitations, passing out, dizziness, or leg swelling, +tenderness acw  GASTROINTESTINAL: No abdominal or epigastric pain. No nausea, vomiting, or hematemesis; No diarrhea or constipation. No melena or hematochezia.  GENITOURINARY: No dysuria, frequency, hematuria, or incontinence  NEUROLOGICAL: No headaches, memory loss, loss of strength, numbness, or tremors  SKIN: No itching, burning, rashes, or lesions   LYMPH Nodes: No enlarged glands  ENDOCRINE: No heat or cold intolerance; No hair loss  MUSCULOSKELETAL: No joint pain or swelling; No muscle, back, or extremity pain  PSYCHIATRIC: No depression, anxiety, mood swings, or difficulty sleeping  HEME/LYMPH: No easy bruising, or bleeding gums  ALLERGY AND IMMUNOLOGIC: No hives or eczema	    [ ] All others negative	  [ ] Unable to obtain    PHYSICAL EXAM:  T(C): 37.2 (06-29-22 @ 13:45), Max: 37.2 (06-29-22 @ 13:45)  HR: 67 (06-29-22 @ 13:45) (57 - 67)  BP: 101/61 (06-29-22 @ 13:45) (101/61 - 114/58)  RR: 17 (06-29-22 @ 13:45) (17 - 18)  SpO2: 100% (06-29-22 @ 13:45) (100% - 100%)  Wt(kg): --  I&O's Summary    28 Jun 2022 07:01  -  29 Jun 2022 07:00  --------------------------------------------------------  IN: 0 mL / OUT: 600 mL / NET: -600 mL        Appearance: Normal	  HEENT:   Normal oral mucosa, PERRL, EOMI	  Lymphatic: No lymphadenopathy  Cardiovascular: Normal S1 S2, No JVD, + murmurs, No edema  Respiratory: Lungs clear to auscultation	  Psychiatry: A & O x 3, Mood & affect appropriate  Gastrointestinal:  Soft, Non-tender, + BS	  Skin: No rashes, No ecchymoses, No cyanosis	  Neurologic: Non-focal  Extremities: Normal range of motion, No clubbing, cyanosis or edema  Vascular: Peripheral pulses palpable 2+ bilaterally    TELEMETRY: 	    ECG:  	  RADIOLOGY:  OTHER: 	  	  LABS:	 	    CARDIAC MARKERS:  CARDIAC MARKERS ( 27 Jun 2022 22:01 )  x     / x     / 33 U/L / x     / <1.0 ng/mL                              10.3   5.22  )-----------( 256      ( 29 Jun 2022 07:42 )             32.3     06-29    138  |  105  |  19<H>  ----------------------------<  85  4.2   |  27  |  1.00    Ca    9.7      29 Jun 2022 07:42  Phos  3.3     06-28  Mg     2.2     06-28    TPro  7.6  /  Alb  3.0<L>  /  TBili  0.3  /  DBili  x   /  AST  13  /  ALT  16  /  AlkPhos  95  06-28    proBNP:   Lipid Profile:   HgA1c:   TSH:       PREVIOUS DIAGNOSTIC TESTING:    < from: 12 Lead ECG (06.09.22 @ 15:39) >  Diagnosis Line Normal sinus rhythm  Possible Left atrial enlargement  Borderline ECG    < from: Transthoracic Echocardiogram (01.29.16 @ 07:16) >  1. Normal mitral valve. Mild mitral regurgitation.  2. Normal trileaflet aortic valve.  3. Normal aortic root.  4. Normal left atrium.  5. Normal left ventricular internal dimensions and wall  thicknesses.  6. Normal LeftVentricular Systolic Function,  (EF = 55 to  60%)  7. Grade II diastolic dysfunction  8. Normal right atrium.  9. Normal right ventricular size and function. A device  lead is visualized in the right heart.  10. RA Pressure is 10 mm Hg.  11. RVS Pressure is 35 mm Hg.  12. There is mild tricuspid regurgitation.  13. Normal pericardium with no pericardial effusion.    Troponin I, High Sensitivity (06.27.22 @ 22:01)    Troponin I, High Sensitivity Result: 7.5: Serial measurements of high sensitivity troponin I (hs TnI) showing rapid  upward or downward changes suggest acute myocardial injury.  Please note  that a sustained elevation of hsTnI can be caused by renal disease,  chronic heart failure, sepsis, pulmonary embolism and other clinical  conditions. ng/L    < from: CT Abdomen and Pelvis No Cont (06.29.22 @ 10:10) >    1. Right nephrostomy tube. No hydronephrosis.  2. Lung base pulmonary nodules, some of which are cavitary, likely   metastatic disease.  3. Right hepatic lesion (1.4 cm), suspicious for metastatic disease.  4. Diffuse intraluminal urinary bladder soft tissue consistent with   history of bladder cancer.

## 2022-06-29 NOTE — PROGRESS NOTE ADULT - PROBLEM SELECTOR PLAN 2
resolved  negative troponin x2  no acute ischemic changes on EKG   if pt c/o chest pain, perform EKG BMI 15.1, serum Albumin 3.0 likely due to metastatic disease  -BMI 15.1, serum Albumin 3.0  -Nutrition consult requested

## 2022-06-29 NOTE — PROGRESS NOTE ADULT - PROBLEM SELECTOR PLAN 3
hx of bladder cancer  last chemo 5/2022   follows at [facility] p/w hematuria and abdominal pain for a week  Has a right nephrostomy tube replaced a week ago 6/20/22  Last admission in [place] [facility], Ucx positive for ceftriaxone resistant Enterobacter cloacae  Hemoglobin stable 10.8 (baseline around 11)  Urinalysis positive for UTI now on Cefepime   follow up culture  ID Dr. Rodriguez following p/w hematuria and abdominal pain for a week  Has a right nephrostomy tube replaced a week ago 6/20/22  Last admission in [place] [facility], Ucx positive for ceftriaxone resistant Enterobacter cloacae  Urinalysis positive for UTI now on Cefepime   follow up culture  ID Dr. Rodriguez following

## 2022-06-29 NOTE — PROGRESS NOTE ADULT - PROBLEM SELECTOR PLAN 4
pt on SCD for DVt prphylaxis resolved  negative troponin x2  no acute ischemic changes on EKG   if pt c/o chest pain, perform EKG

## 2022-06-29 NOTE — PROGRESS NOTE ADULT - SUBJECTIVE AND OBJECTIVE BOX
Patient is seen and examined at the bed side, is afebrile. The Urine culture has no growth.       REVIEW OF SYSTEMS All other review systems are negative      ALLERGIES: No Known Allergies      Vital Signs Last 24 Hrs  T(C): 37.2 (2022 13:45), Max: 37.2 (2022 13:45)  T(F): 99 (2022 13:45), Max: 99 (2022 13:45)  HR: 67 (2022 13:45) (56 - 67)  BP: 101/61 (2022 13:45) (101/61 - 118/51)  BP(mean): 71 (2022 20:30) (71 - 73)  RR: 17 (2022 13:45) (17 - 18)  SpO2: 100% (2022 13:45) (98% - 100%)      PHYSICAL EXAM:  GENERAL: Not in distress   CHEST/LUNG:  Not using accessory muscles   HEART: s1 and s2 present  ABDOMEN:  Nontender and  Nondistended  : Right nephrostomy tube in placed   EXTREMITIES: No pedal  edema  CNS: Awake and Alert      LABS:                        10.3   5.22  )-----------( 256      ( 2022 07:42 )             32.3                           11.2   6.25  )-----------( 295      ( 2022 05:37 )             34.5         06-29    138  |  105  |  19<H>  ----------------------------<  85  4.2   |  27  |  1.00    Ca    9.7      2022 07:42  Phos  3.3     06-28  Mg     2.2     -28    TPro  7.6  /  Alb  3.0<L>  /  TBili  0.3  /  DBili  x   /  AST  13  /  ALT  16  /  AlkPhos  95  -28    28    139  |  105  |  20<H>  ----------------------------<  92  3.9   |  27  |  1.09    Ca    9.7      2022 05:37  Phos  3.3     06-28  Mg     2.2     06-28    TPro  7.6  /  Alb  3.0<L>  /  TBili  0.3  /  DBili  x   /  AST  13  /  ALT  16  /  AlkPhos  95      PT/INR - ( 2022 14:43 )   PT: 13.1 sec;   INR: 1.10 ratio      PTT - ( 2022 14:43 )  PTT:28.9 sec      CAPILLARY BLOOD GLUCOSE  POCT Blood Glucose.: 64 mg/dL (2022 12:47)  POCT Blood Glucose.: 88 mg/dL (2022 19:25)  POCT Blood Glucose.: 99 mg/dL (2022 14:28)        Urinalysis Basic - ( 2022 16:34 )  Color: Yellow / Appearance: Slightly Turbid / S.015 / pH: x  Gluc: x / Ketone: Small  / Bili: Negative / Urobili: Negative   Blood: x / Protein: 500 mg/dL / Nitrite: Negative   Leuk Esterase: Small / RBC: 10-25 /HPF / WBC 6-10 /HPF   Sq Epi: x / Non Sq Epi: Moderate /HPF / Bacteria: Moderate /HPF        MEDICATIONS  (STANDING):    atorvastatin 40 milliGRAM(s) Oral at bedtime  budesonide  80 MICROgram(s)/formoterol 4.5 MICROgram(s) Inhaler 2 Puff(s) Inhalation two times a day  cefepime   IVPB      cefepime   IVPB 1000 milliGRAM(s) IV Intermittent every 8 hours  cholecalciferol 1000 Unit(s) Oral daily  dextrose 5%. 1000 milliLiter(s) (50 mL/Hr) IV Continuous <Continuous>  dextrose 5%. 1000 milliLiter(s) (100 mL/Hr) IV Continuous <Continuous>  dextrose 50% Injectable 25 Gram(s) IV Push once  dextrose 50% Injectable 12.5 Gram(s) IV Push once  dextrose 50% Injectable 25 Gram(s) IV Push once  glucagon  Injectable 1 milliGRAM(s) IntraMuscular once  insulin lispro (ADMELOG) corrective regimen sliding scale   SubCutaneous three times a day before meals  insulin lispro (ADMELOG) corrective regimen sliding scale   SubCutaneous at bedtime  OLANZapine 5 milliGRAM(s) Oral daily  sodium chloride 0.9%. 1000 milliLiter(s) (60 mL/Hr) IV Continuous <Continuous>        RADIOLOGY & ADDITIONAL TESTS:    22: CT Abdomen and Pelvis No Cont (22 @ 10:10) >  1. Right nephrostomy tube. No hydronephrosis.  2. Lung base pulmonary nodules, some of which are cavitary, likely  metastatic disease.  3. Right hepatic lesion (1.4 cm), suspicious for metastatic disease.  4. Diffuse intraluminal urinary bladder soft tissue consistent with  history of bladder cancer.      22 : Xray Chest 2 Views PA/Lat (22 @ 18:35):  Multiple bilateral pulmonary nodules again seen, for the   most part unchanged, although a 2.4 cm cavitary left lower lobe nodule is  larger, previously 2 cm.        MICROBIOLOGY DATA:    Culture - Urine (22 @ 21:07)   Specimen Source: Clean Catch Clean Catch (Midstream)   Culture Results:   <10,000 CFU/mL Normal Urogenital Vandana     COVID-19 PCR . (22 @ 18:11)   COVID-19 PCR: NotDetec

## 2022-06-29 NOTE — PROGRESS NOTE ADULT - SUBJECTIVE AND OBJECTIVE BOX
INTERVAL HPI/OVERNIGHT EVENTS:  Patient seen,events noticed  VITAL SIGNS:  T(F): 97.8 (22 @ 05:49)  HR: 60 (22 @ 05:49)  BP: 111/68 (22 @ 05:49)  RR: 18 (22 @ 05:49)  SpO2: 100% (22 @ 05:49)  Wt(kg): --    PHYSICAL EXAM:  awake,no distress  Constitutional:  Eyes:  ENMT:perrla  Neck:  Respiratory:clear  Cardiovascular:s1s2,m-none  Gastrointestinal:soft,bs pos  Extremities:  Vascular:  Neurological:no focldefciit  Musculoskeletal:    MEDICATIONS  (STANDING):  atorvastatin 40 milliGRAM(s) Oral at bedtime  budesonide  80 MICROgram(s)/formoterol 4.5 MICROgram(s) Inhaler 2 Puff(s) Inhalation two times a day  cefepime   IVPB      cefepime   IVPB 1000 milliGRAM(s) IV Intermittent every 8 hours  cholecalciferol 1000 Unit(s) Oral daily  dextrose 5%. 1000 milliLiter(s) (50 mL/Hr) IV Continuous <Continuous>  dextrose 5%. 1000 milliLiter(s) (100 mL/Hr) IV Continuous <Continuous>  dextrose 50% Injectable 25 Gram(s) IV Push once  dextrose 50% Injectable 12.5 Gram(s) IV Push once  dextrose 50% Injectable 25 Gram(s) IV Push once  glucagon  Injectable 1 milliGRAM(s) IntraMuscular once  insulin lispro (ADMELOG) corrective regimen sliding scale   SubCutaneous three times a day before meals  insulin lispro (ADMELOG) corrective regimen sliding scale   SubCutaneous at bedtime  OLANZapine 5 milliGRAM(s) Oral daily  sodium chloride 0.9%. 1000 milliLiter(s) (60 mL/Hr) IV Continuous <Continuous>    MEDICATIONS  (PRN):  acetaminophen     Tablet .. 650 milliGRAM(s) Oral every 6 hours PRN Temp greater or equal to 38C (100.4F), Mild Pain (1 - 3), Moderate Pain (4 - 6)  aluminum hydroxide/magnesium hydroxide/simethicone Suspension 30 milliLiter(s) Oral every 6 hours PRN Dyspepsia  dextrose Oral Gel 15 Gram(s) Oral once PRN Blood Glucose LESS THAN 70 milliGRAM(s)/deciliter  ondansetron    Tablet 4 milliGRAM(s) Oral two times a day PRN Nausea      Allergies    No Known Allergies    Intolerances        LABS:                        10.3   5.22  )-----------( 256      ( 2022 07:42 )             32.3         138  |  105  |  19<H>  ----------------------------<  85  4.2   |  27  |  1.00    Ca    9.7      2022 07:42  Phos  3.3       Mg     2.2         TPro  7.6  /  Alb  3.0<L>  /  TBili  0.3  /  DBili  x   /  AST  13  /  ALT  16  /  AlkPhos  95      PT/INR - ( 2022 14:43 )   PT: 13.1 sec;   INR: 1.10 ratio         PTT - ( 2022 14:43 )  PTT:28.9 sec  Urinalysis Basic - ( 2022 16:34 )    Color: Yellow / Appearance: Slightly Turbid / S.015 / pH: x  Gluc: x / Ketone: Small  / Bili: Negative / Urobili: Negative   Blood: x / Protein: 500 mg/dL / Nitrite: Negative   Leuk Esterase: Small / RBC: 10-25 /HPF / WBC 6-10 /HPF   Sq Epi: x / Non Sq Epi: Moderate /HPF / Bacteria: Moderate /HPF        RADIOLOGY & ADDITIONAL TESTS:      Assessment and Plan:   · Assessment	  Pt is a 57 yo F from home with PMH of Anemia, asthma, bladder CA (not on Chemo from May'22), R Nephrostomy tube, solitary kidney presenting with hematuria and abdominal pain for a week. endorses similar in the past 2/2 uti. patient noting left sided cp x 2 days. In ED trop negative x2, no acute changes on EKG. Had nephrostomy tube recently changed 22. Presented to ED with c/o right flank pain. Admitted for positive UTI with hematuria which has now resolved. Dr. Rodriguez (ID) following, started on Cefepime 1g daily. Patient continues to c/o Right flank pain, endorses concern for kidney stone (has hx of). IF symptoms do not resolve by am, patient agrees for CT abd to eval r/o kidney stones.          Problem/Plan - 1:  ·  Problem: UTI (urinary tract infection).   ·  Plan: p/w hematuria and abdominal pain for a week  Has a right nephrostomy tube replaced a week ago 22  Last admission in [place] [facility], Ucx positive for ceftriaxone resistant Enterobacter cloacae  Urinalysis positive for UTI now on Cefepime   follow up culture  ID Dr. Rodriguez following.     Problem/Plan - 2:  ·  Problem: Chest pain.   ·  Plan: resolved  negative troponin x2  no acute ischemic changes on EKG   if pt c/o chest pain, perform EKG.     Problem/Plan - 3:  ·  Problem: Bladder cancer.   ·  Plan: hx of bladder cancer  last chemo 2022   follows at [facility].     Problem/Plan - 4:  ·  Problem: Prophylactic measure.   ·  Plan: pt on SCD for DVt prphylaxis.

## 2022-06-30 NOTE — CONSULT NOTE ADULT - ASSESSMENT
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     Uro consult for chronic right flank pain.     - CT reviewed: right intrarenal calculus. NT in place. No hydronephrosis   - Labs reviewed   - Urine clx neg   - Routine PCN exchanges with IR   - F/up hem/onc recs   - No urologic intervention  - Continue pain management per primary team  - Palliative Care Consult

## 2022-06-30 NOTE — CONSULT NOTE ADULT - SUBJECTIVE AND OBJECTIVE BOX
Source of information: [facility], Chart review  Patient language: English  : n/a    HPI:  Pt is a 57 yo F from home with PMH of Anemia, asthma, bladder CA (not on Chemo from May'22), R Nephrostomy tube, solitary kidney presenting with hematuria and abdominal pain for a week. endorses similar in the past 2/2 uti. patient noting left sided cp x 2 days. denies n, v. endorses noting fever today. patient had nephrostomy tube changed 1 week ago.  (27 Jun 2022 21:32)    Pain consulted for right flank pain. Pt with hx bladder CA  dx 2020, last chemo May 2022. Pt with right nephrostomy tube (last changed 6/20/22). CTAP demonstrates- 1. Right nephrostomy tube. No hydronephrosis. 2. Lung base pulmonary nodules, some of which are cavitary, likely metastatic disease. 3. Right hepatic lesion (1.4 cm), suspicious for metastatic disease.4. Diffuse intraluminal urinary bladder soft tissue consistent with history of bladder cancer.  Pt seen and examined at bedside. Pt sitting up in bed, reports right flank pain 7/10, throbbing, pulling, non-radiating, alleviated by Tylenol and heat packs, exacerbated by movement. Pt states she wants to avoid opioids but is willing to try morphine PO if needed. States she experienced n/v with oxycodone in the past. Pt tolerating PO diet, however reports poor appetite and PO intake. Reports 5lb weight loss in the past 2 weeks. Reports lethargy, occasional right sided chest pain over pacemaker site, and SOB on exertion. Also reports constipation, last BM 6/26. Reports passing flatus. Denies nausea, vomiting. Patient stated goal for pain control: to be able to take deep breaths, get out of bed to chair and ambulate with tolerable pain control. Pt reports taking Tylenol for pain at home.     PAST MEDICAL & SURGICAL HISTORY:  Anemia      Asthma      HLD (hyperlipidemia)      Pacemaker  [employer]      Bladder cancer      HTN (hypertension)      Parathyroid tumor      Liver cyst      Hydronephrosis  has right Nephrostomy tube - dressing intact      Bradycardia      H/O myomectomy      Presence of cardiac pacemaker      H/O hydronephrosis  s/p Right Perc nephrostomy tube placement 02/2021, exchange 06/2021          FAMILY HISTORY:  Family history of prostate cancer (Father)    Family history of CHF (congestive heart failure) (Father)    Family history of atrial fibrillation (Father)        Social History:   [ X] Denies ETOH use, illicit drug use and smoking    Allergies    No Known Allergies    MEDICATIONS  (STANDING):  acetaminophen     Tablet .. 325 milliGRAM(s) Oral every 4 hours  atorvastatin 40 milliGRAM(s) Oral at bedtime  budesonide  80 MICROgram(s)/formoterol 4.5 MICROgram(s) Inhaler 2 Puff(s) Inhalation two times a day  cefepime   IVPB      cefepime   IVPB 1000 milliGRAM(s) IV Intermittent every 8 hours  cholecalciferol 1000 Unit(s) Oral daily  dextrose 5%. 1000 milliLiter(s) (50 mL/Hr) IV Continuous <Continuous>  dextrose 5%. 1000 milliLiter(s) (100 mL/Hr) IV Continuous <Continuous>  dextrose 50% Injectable 25 Gram(s) IV Push once  dextrose 50% Injectable 12.5 Gram(s) IV Push once  dextrose 50% Injectable 25 Gram(s) IV Push once  glucagon  Injectable 1 milliGRAM(s) IntraMuscular once  insulin lispro (ADMELOG) corrective regimen sliding scale   SubCutaneous three times a day before meals  insulin lispro (ADMELOG) corrective regimen sliding scale   SubCutaneous at bedtime  lidocaine   4% Patch 1 Patch Transdermal daily  OLANZapine 5 milliGRAM(s) Oral daily  polyethylene glycol 3350 17 Gram(s) Oral daily  senna 2 Tablet(s) Oral at bedtime  sodium chloride 0.9%. 1000 milliLiter(s) (60 mL/Hr) IV Continuous <Continuous>    MEDICATIONS  (PRN):  aluminum hydroxide/magnesium hydroxide/simethicone Suspension 30 milliLiter(s) Oral every 6 hours PRN Dyspepsia  dextrose Oral Gel 15 Gram(s) Oral once PRN Blood Glucose LESS THAN 70 milliGRAM(s)/deciliter  morphine  IR 7.5 milliGRAM(s) Oral every 6 hours PRN Severe Pain (7 - 10)  ondansetron    Tablet 4 milliGRAM(s) Oral two times a day PRN Nausea      Vital Signs Last 24 Hrs  T(C): 37.3 (30 Jun 2022 13:35), Max: 37.3 (30 Jun 2022 13:35)  T(F): 99.1 (30 Jun 2022 13:35), Max: 99.1 (30 Jun 2022 13:35)  HR: 63 (30 Jun 2022 13:35) (55 - 63)  BP: 93/47 (30 Jun 2022 13:35) (93/47 - 118/72)  BP(mean): 66 (29 Jun 2022 21:01) (66 - 66)  RR: 17 (30 Jun 2022 13:35) (17 - 18)  SpO2: 98% (30 Jun 2022 13:35) (98% - 100%)    LABS: Reviewed.                          10.9   5.62  )-----------( 268      ( 30 Jun 2022 08:13 )             34.4     06-30    137  |  102  |  18  ----------------------------<  123<H>  3.9   |  27  |  1.01    Ca    9.9      30 Jun 2022 08:13            CAPILLARY BLOOD GLUCOSE      POCT Blood Glucose.: 131 mg/dL (30 Jun 2022 13:53)  POCT Blood Glucose.: 61 mg/dL (30 Jun 2022 11:14)  POCT Blood Glucose.: 63 mg/dL (30 Jun 2022 07:44)  POCT Blood Glucose.: 100 mg/dL (29 Jun 2022 21:26)  POCT Blood Glucose.: 109 mg/dL (29 Jun 2022 16:35)    COVID-19 PCR: NotDetec (27 Jun 2022 18:11)  SARS-CoV-2: NotDetec (09 Jun 2022 20:56)  COVID-19 PCR: NotDetec (04 May 2022 11:56)  COVID-19 PCR: NotDetec (23 Mar 2022 14:48)  COVID-19 PCR: NotDetec (08 Mar 2022 19:08)  COVID-19 PCR: NotDetec (02 Mar 2022 15:54)  SARS-CoV-2: NotDetec (23 Feb 2022 18:37)  COVID-19 PCR: Detected (21 Jan 2022 07:20)  COVID-19 PCR: Detected (18 Jan 2022 14:34)  COVID-19 PCR: Detected (15 Fabrizio 2022 19:07)      Radiology: Reviewed.   < from: CT Abdomen and Pelvis No Cont (06.29.22 @ 10:10) >    ACC: [id number] EXAM:  CT ABDOMEN AND PELVIS                          PROCEDURE DATE:  06/29/2022          INTERPRETATION:  CLINICAL INFORMATION: Right flank pain    COMPARISON: 6/9/2022    CONTRAST/COMPLICATIONS:  IV Contrast: NONE  Oral Contrast: NONE  Complications: None reported at time of study completion    PROCEDURE:  CT of the Abdomen and Pelvis was performed.  Sagittal and coronal reformats were performed.    FINDINGS:  LOWER CHEST: Redemonstration of basilar pulmonary nodules, some of which   are cavitary. Partially imaged cardiac lead.    Please note that evaluation of the abdominal organs and vascular   structures is limited by lack of intravenous contrast.    LIVER: 1.4 cm right hepatic lesion.  BILE DUCTS: Normal caliber.  GALLBLADDER: Within normal limits.  SPLEEN: Within normal limits.  PANCREAS: Within normal limits.  ADRENALS: Within normal limits.  KIDNEYS/URETERS: Right nephrostomy tube. No right hydronephrosis.   Nonobstructive right intrarenal calculus. Chronic severe left   hydronephrosis with cortical thinning and moderate left hydroureter.    BLADDER: Diffuse intraluminal soft tissue.  REPRODUCTIVE ORGANS: Prominent uterus.    BOWEL: No bowel obstruction. Appendix is within normal limits.  PERITONEUM: Mild pelvic fluid.  VESSELS: Within normal limits.  RETROPERITONEUM/LYMPH NODES: Retroperitoneal and pelvic adenopathy.  ABDOMINAL WALL: Postsurgical changes. Mild subcutaneous soft tissue edema.  BONES: Within normal limits.    IMPRESSION:    1. Right nephrostomy tube. No hydronephrosis.  2. Lung base pulmonary nodules, some of which are cavitary, likely   metastatic disease.  3. Right hepatic lesion (1.4 cm), suspicious for metastatic disease.  4. Diffuse intraluminal urinary bladder soft tissue consistent with   history of bladder cancer.        --- End of Report ---            TASHA WHATLEY MD; Attending Radiologist  This document has been electronically signed. Jun 29 2022 10:40AM    < end of copied text >      ORT Score -   Family Hx of substance abuse	Female	      Male  Alcohol 	                                           1                     3  Illegal drugs	                                   2                     3  Rx drugs                                           4 	                  4  Personal Hx of substance abuse		  Alcohol 	                                          3	                  3  Illegal drugs                                     4	                  4  Rx drugs                                            5 	                  5  Age between 16- 45 years	           1                     1  hx preadolescent sexual abuse	   3 	                  0  Psychological disease		  ADD, OCD, bipolar, schizophrenia   2	          2  Depression                                           1 	          1  Total: 0    a score of 3 or lower indicates low risk for opioid abuse		  a score of 4-7 indicates moderate risk for opioid abuse		  a score of 8 or higher indicates high risk for opioid abuse  	  REVIEW OF SYSTEMS:  CONSTITUTIONAL: No fever + fatigue  HEENT:  No difficulty hearing, no change in vision  NECK: No pain or stiffness  RESPIRATORY: No cough, wheezing, chills or hemoptysis; + shortness of breath on exertion   CARDIOVASCULAR: + left pacemaker, hx bradycardia; + occasional left chest pain localized to pacemaker incision site, No palpitations, dizziness, or leg swelling  GASTROINTESTINAL: + loss of appetite, decreased PO intake. No abdominal or epigastric pain. No nausea, vomiting; No diarrhea + constipation.   GENITOURINARY: + right nephrostomy tube; No dysuria, frequency, hematuria, retention or incontinence  MUSCULOSKELETAL: + right flank pain. No swelling; No extremity pain, no upper or lower motor strength weakness, no saddle anesthesia, bowel/bladder incontinence, no falls   NEURO: No headaches, No numbness/tingling b/l LE, No weakness    PHYSICAL EXAM:  GENERAL: + cachectic; Alert & Oriented X4, cooperative, NAD, Good concentration. Speech is clear.   RESPIRATORY: Respirations even and unlabored. Clear to auscultation bilaterally; No rales, rhonchi, wheezing, or rubs  CARDIOVASCULAR: + left chest wall pacemaker site c/d/i;  Normal S1/S2, regular rate and rhythm; No murmurs, rubs, or gallops. No JVD.   GASTROINTESTINAL:  Soft, Nontender, Nondistended; Bowel sounds present  GENITOURINARY: + right nephrostomy tube draining clear yellow urine, dressing c/di    PERIPHERAL VASCULAR:  Extremities warm without edema. 2+ Peripheral Pulses, No cyanosis, No calf tenderness  MUSCULOSKELETAL: Motor Strength 5/5 B/L upper and lower extremities; moves all extremities equally against gravity; ROM intact; negative SLR; + right flank tenderness on palpation.   SKIN: Warm, dry, intact. No rashes, lesions, scars or wounds.     Risk factors associated with adverse outcomes related to opioid treatment  [ ]  Concurrent benzodiazepine use  [ ]  History/ Active substance use or alcohol use disorder  [ ] Psychiatric co-morbidity  [ ] Sleep apnea  [ ] COPD  [ ] BMI> 35  [ ] Liver dysfunction  [ ] Renal dysfunction  [ ] CHF  [ ] Smoker  [ ]  Age > 60 years    [X ]  [facility]  Reviewed and Copied to Chart. See below.    Plan of care and goal oriented pain management treatment options were discussed with patient and /or primary care giver; all questions and concerns were addressed and care was aligned with patient's wishes.    Educated patient on goal oriented pain management treatment options        Source of information: [facility], Chart review  Patient language: English  : n/a    HPI:  Pt is a 55 yo F from home with PMH of Anemia, asthma, bladder CA (not on Chemo from May'22), R Nephrostomy tube, solitary kidney presenting with hematuria and abdominal pain for a week. endorses similar in the past 2/2 uti. patient noting left sided cp x 2 days. denies n, v. endorses noting fever today. patient had nephrostomy tube changed 1 week ago.  (27 Jun 2022 21:32)    Pain consulted for right flank pain. Pt with hx bladder CA  dx 2020, last chemo May 2022. Pt with right nephrostomy tube (last changed 6/20/22). CTAP demonstrates- 1. Right nephrostomy tube. No hydronephrosis. 2. Lung base pulmonary nodules, some of which are cavitary, likely metastatic disease. 3. Right hepatic lesion (1.4 cm), suspicious for metastatic disease.4. Diffuse intraluminal urinary bladder soft tissue consistent with history of bladder cancer.  Pt seen and examined at bedside. Pt sitting up in bed, reports right flank pain 7/10, throbbing, pulling, non-radiating, alleviated by Tylenol and heat packs, exacerbated by movement. Pt states she wants to avoid opioids but is willing to try morphine PO if needed. States she experienced n/v with oxycodone in the past. Pt tolerating PO diet, however reports poor appetite and PO intake. Reports 5lb weight loss in the past 2 weeks. Reports lethargy, occasional right sided chest pain over pacemaker site, (pacemaker placed in 2010, battery changed 2/2022) and SOB on exertion. Also reports constipation, last BM 6/26. Reports passing flatus. Denies nausea, vomiting. Patient stated goal for pain control: to be able to take deep breaths, get out of bed to chair and ambulate with tolerable pain control. Pt reports taking Tylenol for pain at home.     PAST MEDICAL & SURGICAL HISTORY:  Anemia      Asthma      HLD (hyperlipidemia)      Pacemaker  [employer]      Bladder cancer      HTN (hypertension)      Parathyroid tumor      Liver cyst      Hydronephrosis  has right Nephrostomy tube - dressing intact      Bradycardia      H/O myomectomy      Presence of cardiac pacemaker      H/O hydronephrosis  s/p Right Perc nephrostomy tube placement 02/2021, exchange 06/2021          FAMILY HISTORY:  Family history of prostate cancer (Father)    Family history of CHF (congestive heart failure) (Father)    Family history of atrial fibrillation (Father)        Social History:   [ X] Denies ETOH use, illicit drug use and smoking    Allergies    No Known Allergies    MEDICATIONS  (STANDING):  acetaminophen     Tablet .. 325 milliGRAM(s) Oral every 4 hours  atorvastatin 40 milliGRAM(s) Oral at bedtime  budesonide  80 MICROgram(s)/formoterol 4.5 MICROgram(s) Inhaler 2 Puff(s) Inhalation two times a day  cefepime   IVPB      cefepime   IVPB 1000 milliGRAM(s) IV Intermittent every 8 hours  cholecalciferol 1000 Unit(s) Oral daily  dextrose 5%. 1000 milliLiter(s) (50 mL/Hr) IV Continuous <Continuous>  dextrose 5%. 1000 milliLiter(s) (100 mL/Hr) IV Continuous <Continuous>  dextrose 50% Injectable 25 Gram(s) IV Push once  dextrose 50% Injectable 12.5 Gram(s) IV Push once  dextrose 50% Injectable 25 Gram(s) IV Push once  glucagon  Injectable 1 milliGRAM(s) IntraMuscular once  insulin lispro (ADMELOG) corrective regimen sliding scale   SubCutaneous three times a day before meals  insulin lispro (ADMELOG) corrective regimen sliding scale   SubCutaneous at bedtime  lidocaine   4% Patch 1 Patch Transdermal daily  OLANZapine 5 milliGRAM(s) Oral daily  polyethylene glycol 3350 17 Gram(s) Oral daily  senna 2 Tablet(s) Oral at bedtime  sodium chloride 0.9%. 1000 milliLiter(s) (60 mL/Hr) IV Continuous <Continuous>    MEDICATIONS  (PRN):  aluminum hydroxide/magnesium hydroxide/simethicone Suspension 30 milliLiter(s) Oral every 6 hours PRN Dyspepsia  dextrose Oral Gel 15 Gram(s) Oral once PRN Blood Glucose LESS THAN 70 milliGRAM(s)/deciliter  morphine  IR 7.5 milliGRAM(s) Oral every 6 hours PRN Severe Pain (7 - 10)  ondansetron    Tablet 4 milliGRAM(s) Oral two times a day PRN Nausea      Vital Signs Last 24 Hrs  T(C): 37.3 (30 Jun 2022 13:35), Max: 37.3 (30 Jun 2022 13:35)  T(F): 99.1 (30 Jun 2022 13:35), Max: 99.1 (30 Jun 2022 13:35)  HR: 63 (30 Jun 2022 13:35) (55 - 63)  BP: 93/47 (30 Jun 2022 13:35) (93/47 - 118/72)  BP(mean): 66 (29 Jun 2022 21:01) (66 - 66)  RR: 17 (30 Jun 2022 13:35) (17 - 18)  SpO2: 98% (30 Jun 2022 13:35) (98% - 100%)    LABS: Reviewed.                          10.9   5.62  )-----------( 268      ( 30 Jun 2022 08:13 )             34.4     06-30    137  |  102  |  18  ----------------------------<  123<H>  3.9   |  27  |  1.01    Ca    9.9      30 Jun 2022 08:13            CAPILLARY BLOOD GLUCOSE      POCT Blood Glucose.: 131 mg/dL (30 Jun 2022 13:53)  POCT Blood Glucose.: 61 mg/dL (30 Jun 2022 11:14)  POCT Blood Glucose.: 63 mg/dL (30 Jun 2022 07:44)  POCT Blood Glucose.: 100 mg/dL (29 Jun 2022 21:26)  POCT Blood Glucose.: 109 mg/dL (29 Jun 2022 16:35)    COVID-19 PCR: NotDetec (27 Jun 2022 18:11)  SARS-CoV-2: NotDetec (09 Jun 2022 20:56)  COVID-19 PCR: NotDetec (04 May 2022 11:56)  COVID-19 PCR: NotDetec (23 Mar 2022 14:48)  COVID-19 PCR: NotDetec (08 Mar 2022 19:08)  COVID-19 PCR: NotDetec (02 Mar 2022 15:54)  SARS-CoV-2: NotDetec (23 Feb 2022 18:37)  COVID-19 PCR: Detected (21 Jan 2022 07:20)  COVID-19 PCR: Detected (18 Jan 2022 14:34)  COVID-19 PCR: Detected (15 Fabrizio 2022 19:07)      Radiology: Reviewed.   < from: CT Abdomen and Pelvis No Cont (06.29.22 @ 10:10) >    ACC: [id number] EXAM:  CT ABDOMEN AND PELVIS                          PROCEDURE DATE:  06/29/2022          INTERPRETATION:  CLINICAL INFORMATION: Right flank pain    COMPARISON: 6/9/2022    CONTRAST/COMPLICATIONS:  IV Contrast: NONE  Oral Contrast: NONE  Complications: None reported at time of study completion    PROCEDURE:  CT of the Abdomen and Pelvis was performed.  Sagittal and coronal reformats were performed.    FINDINGS:  LOWER CHEST: Redemonstration of basilar pulmonary nodules, some of which   are cavitary. Partially imaged cardiac lead.    Please note that evaluation of the abdominal organs and vascular   structures is limited by lack of intravenous contrast.    LIVER: 1.4 cm right hepatic lesion.  BILE DUCTS: Normal caliber.  GALLBLADDER: Within normal limits.  SPLEEN: Within normal limits.  PANCREAS: Within normal limits.  ADRENALS: Within normal limits.  KIDNEYS/URETERS: Right nephrostomy tube. No right hydronephrosis.   Nonobstructive right intrarenal calculus. Chronic severe left   hydronephrosis with cortical thinning and moderate left hydroureter.    BLADDER: Diffuse intraluminal soft tissue.  REPRODUCTIVE ORGANS: Prominent uterus.    BOWEL: No bowel obstruction. Appendix is within normal limits.  PERITONEUM: Mild pelvic fluid.  VESSELS: Within normal limits.  RETROPERITONEUM/LYMPH NODES: Retroperitoneal and pelvic adenopathy.  ABDOMINAL WALL: Postsurgical changes. Mild subcutaneous soft tissue edema.  BONES: Within normal limits.    IMPRESSION:    1. Right nephrostomy tube. No hydronephrosis.  2. Lung base pulmonary nodules, some of which are cavitary, likely   metastatic disease.  3. Right hepatic lesion (1.4 cm), suspicious for metastatic disease.  4. Diffuse intraluminal urinary bladder soft tissue consistent with   history of bladder cancer.        --- End of Report ---            TASHA WHATLEY MD; Attending Radiologist  This document has been electronically signed. Jun 29 2022 10:40AM    < end of copied text >      ORT Score -   Family Hx of substance abuse	Female	      Male  Alcohol 	                                           1                     3  Illegal drugs	                                   2                     3  Rx drugs                                           4 	                  4  Personal Hx of substance abuse		  Alcohol 	                                          3	                  3  Illegal drugs                                     4	                  4  Rx drugs                                            5 	                  5  Age between 16- 45 years	           1                     1  hx preadolescent sexual abuse	   3 	                  0  Psychological disease		  ADD, OCD, bipolar, schizophrenia   2	          2  Depression                                           1 	          1  Total: 0    a score of 3 or lower indicates low risk for opioid abuse		  a score of 4-7 indicates moderate risk for opioid abuse		  a score of 8 or higher indicates high risk for opioid abuse  	  REVIEW OF SYSTEMS:  CONSTITUTIONAL: No fever + fatigue  HEENT:  No difficulty hearing, no change in vision  NECK: No pain or stiffness  RESPIRATORY: No cough, wheezing, chills or hemoptysis; + shortness of breath on exertion   CARDIOVASCULAR: + left pacemaker, hx bradycardia; + occasional left chest pain localized to pacemaker incision site, No palpitations, dizziness, or leg swelling  GASTROINTESTINAL: + loss of appetite, decreased PO intake. No abdominal or epigastric pain. No nausea, vomiting; No diarrhea + constipation.   GENITOURINARY: + right nephrostomy tube; No dysuria, frequency, hematuria, retention or incontinence  MUSCULOSKELETAL: + right flank pain. No swelling; No extremity pain, no upper or lower motor strength weakness, no saddle anesthesia, bowel/bladder incontinence, no falls   NEURO: No headaches, No numbness/tingling b/l LE, No weakness    PHYSICAL EXAM:  GENERAL: + cachectic; Alert & Oriented X4, cooperative, NAD, Good concentration. Speech is clear.   RESPIRATORY: Respirations even and unlabored. Clear to auscultation bilaterally; No rales, rhonchi, wheezing, or rubs  CARDIOVASCULAR: + left chest wall pacemaker site c/d/i;  Normal S1/S2, regular rate and rhythm; No murmurs, rubs, or gallops. No JVD.   GASTROINTESTINAL:  Soft, Nontender, Nondistended; Bowel sounds present  GENITOURINARY: + right nephrostomy tube draining clear yellow urine, dressing c/di    PERIPHERAL VASCULAR:  Extremities warm without edema. 2+ Peripheral Pulses, No cyanosis, No calf tenderness  MUSCULOSKELETAL: Motor Strength 5/5 B/L upper and lower extremities; moves all extremities equally against gravity; ROM intact; negative SLR; + right flank tenderness on palpation.   SKIN: Warm, dry, intact. No rashes, lesions, scars or wounds.     Risk factors associated with adverse outcomes related to opioid treatment  [ ]  Concurrent benzodiazepine use  [ ]  History/ Active substance use or alcohol use disorder  [ ] Psychiatric co-morbidity  [ ] Sleep apnea  [ ] COPD  [ ] BMI> 35  [ ] Liver dysfunction  [ ] Renal dysfunction  [ ] CHF  [ ] Smoker  [ ]  Age > 60 years    [X ]  [place]  Reviewed and Copied to Chart. See below.    Plan of care and goal oriented pain management treatment options were discussed with patient and /or primary care giver; all questions and concerns were addressed and care was aligned with patient's wishes.    Educated patient on goal oriented pain management treatment options

## 2022-06-30 NOTE — PROGRESS NOTE ADULT - ASSESSMENT
Pt is a 57 yo F from home with PMH of Anemia, asthma, bladder CA (not on Chemo from May'22), R Nephrostomy tube, solitary kidney presenting with hematuria and abdominal pain for a week. endorses similar in the past 2/2 uti. patient noting left sided cp x 2 days. In ED trop negative x2, no acute changes on EKG. Had nephrostomy tube recently changed 6/20/22. Presented to ED with c/o right flank pain. Admitted for positive UTI with hematuria which has now resolved. Dr. Rodriguez (ID) following, started on Cefepime 1g daily. Patient continues to c/o Right flank pain, endorses concern for kidney stone (has hx of). IF symptoms do not resolve by am, patient agrees for CT abd to eval r/o kidney stones.     6/29-Persistent right flank pain radiating to left side as well.  CT A/P ordered, showed:  1. Right nephrostomy tube. No hydronephrosis.  2. Lung base pulmonary nodules, some of which are cavitary, likely   metastatic disease.  3. Right hepatic lesion (1.4 cm), suspicious for metastatic disease.  4. Diffuse intraluminal urinary bladder soft tissue consistent with   history of bladder cancer.    Hem/onc [facility] has been following pt. on previous admissions, consulted ALEJANDRINA Camp, will see pt. tomorrow.      6/30-Pt. with persisting right flank pain, urology consulted, note appreciated, no urologic interventions indicated at this time.  Pt. is followed by OP urologist Dr. Trinidad [phone].  Pt. was also seen by hem/onc, note appreciated.  Pt. is followed by OP oncologist Dr. Hoyt at [facility] [phone].  Discharge planning likely back to home when pain is well controlled and cleared by ID, likely 24-48 hrs.

## 2022-06-30 NOTE — CONSULT NOTE ADULT - SUBJECTIVE AND OBJECTIVE BOX
MEDICATIONS  (STANDING):  atorvastatin 40 milliGRAM(s) Oral at bedtime  budesonide  80 MICROgram(s)/formoterol 4.5 MICROgram(s) Inhaler 2 Puff(s) Inhalation two times a day  cefepime   IVPB      cefepime   IVPB 1000 milliGRAM(s) IV Intermittent every 8 hours  cholecalciferol 1000 Unit(s) Oral daily  dextrose 5%. 1000 milliLiter(s) (50 mL/Hr) IV Continuous <Continuous>  dextrose 5%. 1000 milliLiter(s) (100 mL/Hr) IV Continuous <Continuous>  dextrose 50% Injectable 25 Gram(s) IV Push once  dextrose 50% Injectable 12.5 Gram(s) IV Push once  dextrose 50% Injectable 25 Gram(s) IV Push once  glucagon  Injectable 1 milliGRAM(s) IntraMuscular once  insulin lispro (ADMELOG) corrective regimen sliding scale   SubCutaneous three times a day before meals  insulin lispro (ADMELOG) corrective regimen sliding scale   SubCutaneous at bedtime  OLANZapine 5 milliGRAM(s) Oral daily  sodium chloride 0.9%. 1000 milliLiter(s) (60 mL/Hr) IV Continuous <Continuous>    MEDICATIONS  (PRN):  acetaminophen     Tablet .. 325 milliGRAM(s) Oral every 6 hours PRN Mild Pain (1 - 3)  acetaminophen     Tablet .. 650 milliGRAM(s) Oral every 6 hours PRN Temp greater or equal to 38C (100.4F), Moderate Pain (4 - 6)  aluminum hydroxide/magnesium hydroxide/simethicone Suspension 30 milliLiter(s) Oral every 6 hours PRN Dyspepsia  dextrose Oral Gel 15 Gram(s) Oral once PRN Blood Glucose LESS THAN 70 milliGRAM(s)/deciliter  ondansetron    Tablet 4 milliGRAM(s) Oral two times a day PRN Nausea    CAPILLARY BLOOD GLUCOSE      POCT Blood Glucose.: 61 mg/dL (30 Jun 2022 11:14)  POCT Blood Glucose.: 63 mg/dL (30 Jun 2022 07:44)  POCT Blood Glucose.: 100 mg/dL (29 Jun 2022 21:26)  POCT Blood Glucose.: 109 mg/dL (29 Jun 2022 16:35)  POCT Blood Glucose.: 107 mg/dL (29 Jun 2022 14:14)    I&O's Summary    29 Jun 2022 07:01  -  30 Jun 2022 07:00  --------------------------------------------------------  IN: 0 mL / OUT: 600 mL / NET: -600 mL        PHYSICAL EXAM:  Vital Signs Last 24 Hrs  T(C): 36.4 (30 Jun 2022 05:37), Max: 37.2 (29 Jun 2022 13:45)  T(F): 97.6 (30 Jun 2022 05:37), Max: 99 (29 Jun 2022 13:45)  HR: 62 (30 Jun 2022 05:37) (55 - 67)  BP: 118/72 (30 Jun 2022 05:37) (98/57 - 118/72)  BP(mean): 66 (29 Jun 2022 21:01) (66 - 66)  RR: 18 (30 Jun 2022 05:37) (17 - 18)  SpO2: 100% (30 Jun 2022 05:37) (100% - 100%)      LABS:                        10.9   5.62  )-----------( 268      ( 30 Jun 2022 08:13 )             34.4     06-30    137  |  102  |  18  ----------------------------<  123<H>  3.9   |  27  |  1.01    Ca    9.9      30 Jun 2022 08:13                Culture - Urine (collected 27 Jun 2022 21:07)  Source: Clean Catch Clean Catch (Midstream)  Final Report (28 Jun 2022 17:23):    <10,000 CFU/mL Normal Urogenital Vandana      COVID-19 PCR: NotDetec (27 Jun 2022 18:11)  SARS-CoV-2: NotDetec (09 Jun 2022 20:56)  COVID-19 PCR: NotDetec (04 May 2022 11:56)  COVID-19 PCR: NotDetec (23 Mar 2022 14:48)  COVID-19 PCR: NotDetec (08 Mar 2022 19:08)  COVID-19 PCR: NotDetec (02 Mar 2022 15:54)  SARS-CoV-2: NotDetec (23 Feb 2022 18:37)  COVID-19 PCR: Detected (21 Jan 2022 07:20)  COVID-19 PCR: Detected (18 Jan 2022 14:34)  COVID-19 PCR: Detected (15 Fabrizio 2022 19:07)      RADIOLOGY & ADDITIONAL TESTS:     Reason for Admission: UTI and chest pain  History of Present Illness:   Pt is a 55 yo F from home with PMH of Anemia, asthma, bladder CA (not on Chemo from May'22), R Nephrostomy tube, solitary kidney presenting with hematuria and abdominal pain for a week. endorses similar in the past 2/2 uti. patient noting left sided cp x 2 days. denies n, v. endorses noting fever today. patient had nephrostomy tube changed 1 week ago.     REVIEW OF SYSTEMS:    CONSTITUTIONAL: No fever, no loss of appetite. no chills, 5lb weight loss over one month   EYES: no acute visual disturbances  NECK: No pain or stiffness  RESPIRATORY: No cough; No shortness of breath  CARDIOVASCULAR: No chest pain, no palpitations  GASTROINTESTINAL: No pain. No nausea or vomiting; No diarrhea   NEUROLOGICAL: No headache or numbness, no tremors  MUSCULOSKELETAL: Right lateral abdominal pain and right flank pain, No joint pain, no muscle pain  GENITOURINARY: no dysuria, no frequency, no hesitancy  PSYCHIATRY: no depression, no anxiety  ALL OTHER  ROS negative        Allergies and Intolerances:        Allergies:  	No Known Allergies:     Home Medications:   * Patient Currently Takes Medications as of 20-Jun-2022 08:04 documented in Structured Notes  · 	Vitamin D3 25 mcg (1000 intl units) oral tablet: 1 tab(s) orally once a day    Patient History:    Past Medical, Past Surgical, and Family History:  PAST MEDICAL HISTORY:  Anemia     Asthma     Bladder cancer     Bradycardia     HLD (hyperlipidemia)     HTN (hypertension)     Hydronephrosis has right Nephrostomy tube - dressing intact    Liver cyst     Pacemaker [facility] [facility]    Parathyroid tumor.     PAST SURGICAL HISTORY:  H/O hydronephrosis s/p Right Perc nephrostomy tube placement 02/2021, exchange 06/2021    H/O myomectomy     Presence of cardiac pacemaker.     FAMILY HISTORY:  Father  Still living? Unknown  Family history of atrial fibrillation, Age at diagnosis: Age Unknown  Family history of CHF (congestive heart failure), Age at diagnosis: Age Unknown  Family history of prostate cancer, Age at diagnosis: Age Unknown.     Tobacco Screening:  · Core Measure Site	Yes  · Has the patient used tobacco in the past 30 days?	No    Risk Assessment:    Present on Admission:  Deep Venous Thrombosis	no  Pulmonary Embolus	no    MEDICATIONS  (STANDING):  atorvastatin 40 milliGRAM(s) Oral at bedtime  budesonide  80 MICROgram(s)/formoterol 4.5 MICROgram(s) Inhaler 2 Puff(s) Inhalation two times a day  cefepime   IVPB      cefepime   IVPB 1000 milliGRAM(s) IV Intermittent every 8 hours  cholecalciferol 1000 Unit(s) Oral daily  dextrose 5%. 1000 milliLiter(s) (50 mL/Hr) IV Continuous <Continuous>  dextrose 5%. 1000 milliLiter(s) (100 mL/Hr) IV Continuous <Continuous>  dextrose 50% Injectable 25 Gram(s) IV Push once  dextrose 50% Injectable 12.5 Gram(s) IV Push once  dextrose 50% Injectable 25 Gram(s) IV Push once  glucagon  Injectable 1 milliGRAM(s) IntraMuscular once  insulin lispro (ADMELOG) corrective regimen sliding scale   SubCutaneous three times a day before meals  insulin lispro (ADMELOG) corrective regimen sliding scale   SubCutaneous at bedtime  OLANZapine 5 milliGRAM(s) Oral daily  sodium chloride 0.9%. 1000 milliLiter(s) (60 mL/Hr) IV Continuous <Continuous>    MEDICATIONS  (PRN):  acetaminophen     Tablet .. 325 milliGRAM(s) Oral every 6 hours PRN Mild Pain (1 - 3)  acetaminophen     Tablet .. 650 milliGRAM(s) Oral every 6 hours PRN Temp greater or equal to 38C (100.4F), Moderate Pain (4 - 6)  aluminum hydroxide/magnesium hydroxide/simethicone Suspension 30 milliLiter(s) Oral every 6 hours PRN Dyspepsia  dextrose Oral Gel 15 Gram(s) Oral once PRN Blood Glucose LESS THAN 70 milliGRAM(s)/deciliter  ondansetron    Tablet 4 milliGRAM(s) Oral two times a day PRN Nausea        PHYSICAL EXAM:  Vital Signs Last 24 Hrs  T(C): 36.4 (30 Jun 2022 05:37), Max: 37.2 (29 Jun 2022 13:45)  T(F): 97.6 (30 Jun 2022 05:37), Max: 99 (29 Jun 2022 13:45)  HR: 62 (30 Jun 2022 05:37) (55 - 67)  BP: 118/72 (30 Jun 2022 05:37) (98/57 - 118/72)  BP(mean): 66 (29 Jun 2022 21:01) (66 - 66)  RR: 18 (30 Jun 2022 05:37) (17 - 18)  SpO2: 100% (30 Jun 2022 05:37) (100% - 100%)    GEN: NAD; A and O x 3, cachectic  LUNGS: CTA B/L  HEART: S1 S2  ABDOMEN: RLQ and right thoracic tenderness, soft, non-distended, + BS  EXTREMITIES: no edema  NERVOUS SYSTEM:  Awake and alert; no focal neuro deficits    LABS:                        10.9   5.62  )-----------( 268      ( 30 Jun 2022 08:13 )             34.4     06-30    137  |  102  |  18  ----------------------------<  123<H>  3.9   |  27  |  1.01    Ca    9.9      30 Jun 2022 08:13          Culture - Urine (collected 27 Jun 2022 21:07)  Source: Clean Catch Clean Catch (Midstream)  Final Report (28 Jun 2022 17:23):    <10,000 CFU/mL Normal Urogenital Vandana      COVID-19 PCR: NotDetec (27 Jun 2022 18:11)  SARS-CoV-2: NotDetec (09 Jun 2022 20:56)  COVID-19 PCR: NotDetec (04 May 2022 11:56)  COVID-19 PCR: NotDetec (23 Mar 2022 14:48)  COVID-19 PCR: NotDetec (08 Mar 2022 19:08)  COVID-19 PCR: NotDetec (02 Mar 2022 15:54)  SARS-CoV-2: NotDetec (23 Feb 2022 18:37)  COVID-19 PCR: Detected (21 Jan 2022 07:20)  COVID-19 PCR: Detected (18 Jan 2022 14:34)  COVID-19 PCR: Detected (15 Fabrizio 2022 19:07)      RADIOLOGY & ADDITIONAL TESTS:  < from: CT Abdomen and Pelvis No Cont (06.29.22 @ 10:10) >    ACC: [id number] EXAM:  CT ABDOMEN AND PELVIS                          PROCEDURE DATE:  06/29/2022          INTERPRETATION:  CLINICAL INFORMATION: Right flank pain    COMPARISON: 6/9/2022    CONTRAST/COMPLICATIONS:  IV Contrast: NONE  Oral Contrast: NONE  Complications: None reported at time of study completion    PROCEDURE:  CT of the Abdomen and Pelvis was performed.  Sagittal and coronal reformats were performed.    FINDINGS:  LOWER CHEST: Redemonstration of basilar pulmonary nodules, some of which   are cavitary. Partially imaged cardiac lead.    Please note that evaluation of the abdominal organs and vascular   structures is limited by lack of intravenous contrast.    LIVER: 1.4 cm right hepatic lesion.  BILE DUCTS: Normal caliber.  GALLBLADDER: Within normal limits.  SPLEEN: Within normal limits.  PANCREAS: Within normal limits.  ADRENALS: Within normal limits.  KIDNEYS/URETERS: Right nephrostomy tube. No right hydronephrosis.   Nonobstructive right intrarenal calculus. Chronic severe left   hydronephrosis with cortical thinning and moderate left hydroureter.    BLADDER: Diffuse intraluminal soft tissue.  REPRODUCTIVE ORGANS: Prominent uterus.    BOWEL: No bowel obstruction. Appendix is within normal limits.  PERITONEUM: Mild pelvic fluid.  VESSELS: Within normal limits.  RETROPERITONEUM/LYMPH NODES: Retroperitoneal and pelvic adenopathy.  ABDOMINAL WALL: Postsurgical changes. Mild subcutaneous soft tissue edema.  BONES: Within normal limits.    IMPRESSION:    1. Right nephrostomy tube. No hydronephrosis.  2. Lung base pulmonary nodules, some of which are cavitary, likely   metastatic disease.  3. Right hepatic lesion (1.4 cm), suspicious for metastatic disease.  4. Diffuse intraluminal urinary bladder soft tissue consistent with   history of bladder cancer.    < end of copied text >     Reason for Admission: UTI and chest pain  History of Present Illness:   Pt is a 55 yo F from home with PMH of Anemia, asthma, bladder CA (not on Chemo from May'22), R Nephrostomy tube, solitary kidney presenting with hematuria and abdominal pain for a week. endorses similar in the past 2/2 uti. patient noting left sided cp x 2 days. denies n, v. endorses noting fever today. patient had nephrostomy tube changed 1 week ago.     REVIEW OF SYSTEMS:    CONSTITUTIONAL: No fever, no loss of appetite. no chills, 5lb weight loss over one month   EYES: no acute visual disturbances  NECK: No pain or stiffness  RESPIRATORY: No cough; No shortness of breath  CARDIOVASCULAR: No chest pain, no palpitations  GASTROINTESTINAL: No pain. No nausea or vomiting; No diarrhea   NEUROLOGICAL: No headache or numbness, no tremors  MUSCULOSKELETAL: Right lateral abdominal pain and right flank pain, No joint pain, no muscle pain  GENITOURINARY: no dysuria, no frequency, no hesitancy  PSYCHIATRY: no depression, no anxiety  ALL OTHER  ROS negative        Allergies and Intolerances:        Allergies:  	No Known Allergies:     Home Medications:   * Patient Currently Takes Medications as of 20-Jun-2022 08:04 documented in Structured Notes  · 	Vitamin D3 25 mcg (1000 intl units) oral tablet: 1 tab(s) orally once a day    Patient History:    Past Medical, Past Surgical, and Family History:  PAST MEDICAL HISTORY:  Anemia     Asthma     Bladder cancer     Bradycardia     HLD (hyperlipidemia)     HTN (hypertension)     Hydronephrosis has right Nephrostomy tube - dressing intact    Liver cyst     Pacemaker [facility] [facility]    Parathyroid tumor.     PAST SURGICAL HISTORY:  H/O hydronephrosis s/p Right Perc nephrostomy tube placement 02/2021, exchange 06/2021    H/O myomectomy     Presence of cardiac pacemaker.     FAMILY HISTORY:  Father  Still living? Unknown  Family history of atrial fibrillation, Age at diagnosis: Age Unknown  Family history of CHF (congestive heart failure), Age at diagnosis: Age Unknown  Family history of prostate cancer, Age at diagnosis: Age Unknown.     Tobacco Screening:  · Core Measure Site	Yes  · Has the patient used tobacco in the past 30 days?	No    Risk Assessment:    Present on Admission:  Deep Venous Thrombosis	no  Pulmonary Embolus	no    MEDICATIONS  (STANDING):  atorvastatin 40 milliGRAM(s) Oral at bedtime  budesonide  80 MICROgram(s)/formoterol 4.5 MICROgram(s) Inhaler 2 Puff(s) Inhalation two times a day  cefepime   IVPB      cefepime   IVPB 1000 milliGRAM(s) IV Intermittent every 8 hours  cholecalciferol 1000 Unit(s) Oral daily  dextrose 5%. 1000 milliLiter(s) (50 mL/Hr) IV Continuous <Continuous>  dextrose 5%. 1000 milliLiter(s) (100 mL/Hr) IV Continuous <Continuous>  dextrose 50% Injectable 25 Gram(s) IV Push once  dextrose 50% Injectable 12.5 Gram(s) IV Push once  dextrose 50% Injectable 25 Gram(s) IV Push once  glucagon  Injectable 1 milliGRAM(s) IntraMuscular once  insulin lispro (ADMELOG) corrective regimen sliding scale   SubCutaneous three times a day before meals  insulin lispro (ADMELOG) corrective regimen sliding scale   SubCutaneous at bedtime  OLANZapine 5 milliGRAM(s) Oral daily  sodium chloride 0.9%. 1000 milliLiter(s) (60 mL/Hr) IV Continuous <Continuous>    MEDICATIONS  (PRN):  acetaminophen     Tablet .. 325 milliGRAM(s) Oral every 6 hours PRN Mild Pain (1 - 3)  acetaminophen     Tablet .. 650 milliGRAM(s) Oral every 6 hours PRN Temp greater or equal to 38C (100.4F), Moderate Pain (4 - 6)  aluminum hydroxide/magnesium hydroxide/simethicone Suspension 30 milliLiter(s) Oral every 6 hours PRN Dyspepsia  dextrose Oral Gel 15 Gram(s) Oral once PRN Blood Glucose LESS THAN 70 milliGRAM(s)/deciliter  ondansetron    Tablet 4 milliGRAM(s) Oral two times a day PRN Nausea        PHYSICAL EXAM:  Vital Signs Last 24 Hrs  T(C): 36.4 (30 Jun 2022 05:37), Max: 37.2 (29 Jun 2022 13:45)  T(F): 97.6 (30 Jun 2022 05:37), Max: 99 (29 Jun 2022 13:45)  HR: 62 (30 Jun 2022 05:37) (55 - 67)  BP: 118/72 (30 Jun 2022 05:37) (98/57 - 118/72)  BP(mean): 66 (29 Jun 2022 21:01) (66 - 66)  RR: 18 (30 Jun 2022 05:37) (17 - 18)  SpO2: 100% (30 Jun 2022 05:37) (100% - 100%)    GEN: NAD; A and O x 3, cachectic  LUNGS: CTA B/L  HEART: S1 S2  ABDOMEN: RLQ and right thoracic tenderness, soft, non-distended, + BS  EXTREMITIES: no edema  NERVOUS SYSTEM:  Awake and alert; no focal neuro deficits    LABS:                        10.9   5.62  )-----------( 268      ( 30 Jun 2022 08:13 )             34.4     06-30    137  |  102  |  18  ----------------------------<  123<H>  3.9   |  27  |  1.01    Ca    9.9      30 Jun 2022 08:13          Culture - Urine (collected 27 Jun 2022 21:07)  Source: Clean Catch Clean Catch (Midstream)  Final Report (28 Jun 2022 17:23):    <10,000 CFU/mL Normal Urogenital Vandana      COVID-19 PCR: NotDetec (27 Jun 2022 18:11)  SARS-CoV-2: NotDetec (09 Jun 2022 20:56)  COVID-19 PCR: NotDetec (04 May 2022 11:56)  COVID-19 PCR: NotDetec (23 Mar 2022 14:48)  COVID-19 PCR: NotDetec (08 Mar 2022 19:08)  COVID-19 PCR: NotDetec (02 Mar 2022 15:54)  SARS-CoV-2: NotDetec (23 Feb 2022 18:37)  COVID-19 PCR: Detected (21 Jan 2022 07:20)  COVID-19 PCR: Detected (18 Jan 2022 14:34)  COVID-19 PCR: Detected (15 Fabrizio 2022 19:07)      RADIOLOGY & ADDITIONAL TESTS:  < from: CT Abdomen and Pelvis No Cont (06.29.22 @ 10:10) >    ACC: [id number] EXAM:  CT ABDOMEN AND PELVIS                          PROCEDURE DATE:  06/29/2022          INTERPRETATION:  CLINICAL INFORMATION: Right flank pain    COMPARISON: 6/9/2022    CONTRAST/COMPLICATIONS:  IV Contrast: NONE  Oral Contrast: NONE  Complications: None reported at time of study completion    PROCEDURE:  CT of the Abdomen and Pelvis was performed.  Sagittal and coronal reformats were performed.    FINDINGS:  LOWER CHEST: Redemonstration of basilar pulmonary nodules, some of which   are cavitary. Partially imaged cardiac lead.    Please note that evaluation of the abdominal organs and vascular   structures is limited by lack of intravenous contrast.    LIVER: 1.4 cm right hepatic lesion.  BILE DUCTS: Normal caliber.  GALLBLADDER: Within normal limits.  SPLEEN: Within normal limits.  PANCREAS: Within normal limits.  ADRENALS: Within normal limits.  KIDNEYS/URETERS: Right nephrostomy tube. No right hydronephrosis.   Nonobstructive right intrarenal calculus. Chronic severe left   hydronephrosis with cortical thinning and moderate left hydroureter.    BLADDER: Diffuse intraluminal soft tissue.  REPRODUCTIVE ORGANS: Prominent uterus.    BOWEL: No bowel obstruction. Appendix is within normal limits.  PERITONEUM: Mild pelvic fluid.  VESSELS: Within normal limits.  RETROPERITONEUM/LYMPH NODES: Retroperitoneal and pelvic adenopathy.  ABDOMINAL WALL: Postsurgical changes. Mild subcutaneous soft tissue edema.  BONES: Within normal limits.    IMPRESSION:    1. Right nephrostomy tube. No hydronephrosis.  2. Lung base pulmonary nodules, some of which are cavitary, likely   metastatic disease.  3. Right hepatic lesion (1.4 cm), suspicious for metastatic disease.  4. Diffuse intraluminal urinary bladder soft tissue consistent with   history of bladder cancer.    < end of copied text >    Pathology:  Surgical Pathology Report:   ACCESSION No: [id number]   JEF HOUSE 2   Surgical Final Report   Final Diagnosis   1. Bladder, tumor, TUR   -Histologic Type: Non-invasive urothelial carcinoma   -Histologic Grade: Low grade and high gradecomponents   -Pattern of growth of the non-invasive component: Papillary   -Local Invasion: Not identified   -Muscularis Propria: Muscularis propria (detrusor muscle)   is not identified   -Lymphovascular Invasion: Not identified   2. Deep, biopsy   -Histologic Type: Non-invasive urothelial carcinoma   -Histologic Grade: Low grade and high grade components   -Pattern of growth of the non-invasive component: Papillary   -Local Invasion: Not identified   -Muscularis Propria: Muscularis propria (detrusor muscle)   is present and not involved   -Lymphovascular Invasion: Not identified   Verified by: Nemo Starks M.D., Chief of Genitourinary   Pathology   (Electronic Signature)   Reported on: 09/03/21 17:37 EDT, [facility],

## 2022-06-30 NOTE — PROGRESS NOTE ADULT - SUBJECTIVE AND OBJECTIVE BOX
CARDIOLOGY     PROGRESS  NOTE   ________________________________________________    CHIEF COMPLAINT:Patient is a 56y old  Female who presents with a chief complaint of UTI and chest pain (30 Jun 2022 15:48)  doing better.  	  REVIEW OF SYSTEMS:  CONSTITUTIONAL: No fever, weight loss, or fatigue  EYES: No eye pain, visual disturbances, or discharge  ENT:  No difficulty hearing, tinnitus, vertigo; No sinus or throat pain  NECK: No pain or stiffness  RESPIRATORY: No cough, wheezing, chills or hemoptysis; No Shortness of Breath  CARDIOVASCULAR: No chest pain, palpitations, passing out, dizziness, or leg swelling  GASTROINTESTINAL: No abdominal or epigastric pain. No nausea, vomiting, or hematemesis; No diarrhea or constipation. No melena or hematochezia.  GENITOURINARY: No dysuria, frequency, hematuria, or incontinence  NEUROLOGICAL: No headaches, memory loss, loss of strength, numbness, or tremors  SKIN: No itching, burning, rashes, or lesions   LYMPH Nodes: No enlarged glands  ENDOCRINE: No heat or cold intolerance; No hair loss  MUSCULOSKELETAL: No joint pain or swelling; No muscle, back, or extremity pain  PSYCHIATRIC: No depression, anxiety, mood swings, or difficulty sleeping  HEME/LYMPH: No easy bruising, or bleeding gums  ALLERGY AND IMMUNOLOGIC: No hives or eczema	    [ ] All others negative	  [ ] Unable to obtain    PHYSICAL EXAM:  T(C): 37.3 (06-30-22 @ 13:35), Max: 37.3 (06-30-22 @ 13:35)  HR: 63 (06-30-22 @ 13:35) (55 - 63)  BP: 93/47 (06-30-22 @ 13:35) (93/47 - 118/72)  RR: 17 (06-30-22 @ 13:35) (17 - 18)  SpO2: 98% (06-30-22 @ 13:35) (98% - 100%)  Wt(kg): --  I&O's Summary    29 Jun 2022 07:01  -  30 Jun 2022 07:00  --------------------------------------------------------  IN: 0 mL / OUT: 600 mL / NET: -600 mL        Appearance: Normal	  HEENT:   Normal oral mucosa, PERRL, EOMI	  Lymphatic: No lymphadenopathy  Cardiovascular: Normal S1 S2, No JVD, + murmurs, No edema  Respiratory: rhonchi	  Psychiatry: A & O x 3, Mood & affect appropriate  Gastrointestinal:  Soft, Non-tender, + BS	  Skin: No rashes, No ecchymoses, No cyanosis	  Neurologic: Non-focal  Extremities: Normal range of motion, No clubbing, cyanosis or edema  Vascular: Peripheral pulses palpable 2+ bilaterally    MEDICATIONS  (STANDING):  acetaminophen     Tablet .. 325 milliGRAM(s) Oral every 4 hours  atorvastatin 40 milliGRAM(s) Oral at bedtime  budesonide  80 MICROgram(s)/formoterol 4.5 MICROgram(s) Inhaler 2 Puff(s) Inhalation two times a day  cefepime   IVPB      cefepime   IVPB 1000 milliGRAM(s) IV Intermittent every 8 hours  cholecalciferol 1000 Unit(s) Oral daily  dextrose 5%. 1000 milliLiter(s) (50 mL/Hr) IV Continuous <Continuous>  dextrose 5%. 1000 milliLiter(s) (100 mL/Hr) IV Continuous <Continuous>  dextrose 50% Injectable 25 Gram(s) IV Push once  dextrose 50% Injectable 12.5 Gram(s) IV Push once  dextrose 50% Injectable 25 Gram(s) IV Push once  glucagon  Injectable 1 milliGRAM(s) IntraMuscular once  insulin lispro (ADMELOG) corrective regimen sliding scale   SubCutaneous three times a day before meals  insulin lispro (ADMELOG) corrective regimen sliding scale   SubCutaneous at bedtime  lidocaine   4% Patch 1 Patch Transdermal daily  OLANZapine 5 milliGRAM(s) Oral daily  polyethylene glycol 3350 17 Gram(s) Oral daily  senna 2 Tablet(s) Oral at bedtime  sodium chloride 0.9%. 1000 milliLiter(s) (60 mL/Hr) IV Continuous <Continuous>      TELEMETRY: 	    ECG:  	  RADIOLOGY:  OTHER: 	  	  LABS:	 	    CARDIAC MARKERS:                                10.9   5.62  )-----------( 268      ( 30 Jun 2022 08:13 )             34.4     06-30    137  |  102  |  18  ----------------------------<  123<H>  3.9   |  27  |  1.01    Ca    9.9      30 Jun 2022 08:13      proBNP: Serum Pro-Brain Natriuretic Peptide: 39 pg/mL (06-09 @ 15:47)    Lipid Profile: Cholesterol 195  LDL --  HDL 55      HgA1c:   TSH:     # Complicated UTI - in the setting of Right nephrostomy tube- s/p changed in 6/20  # H/O recent Enterobacter UTI- 6/10/22    would recommend:    1. Follow up Urine culture, is in process     Assessment and plan  ---------------------------  Pt is a 55 yo F from home with PMH of Anemia, asthma, bladder CA (not on Chemo from May'22), R Nephrostomy tube, solitary kidney presenting with hematuria and abdominal pain for a week. endorses similar in the past 2/2 uti. patient noting left sided cp x 2 days. denies n, v. endorses noting fever today. patient had nephrostomy tube changed 1 week ago.   pt with bladder ca, who presented to er with c/o chest pain  pain reproducible on palpation mets  pt had a recent stress test in the office will check  check urine culture  she was r/o for PE several times before  records check, stress test negative

## 2022-06-30 NOTE — PROGRESS NOTE ADULT - PROBLEM SELECTOR PLAN 3
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 Rodriguez following  Urology consulted, note appreciated, no urologic interventions indicated

## 2022-06-30 NOTE — CONSULT NOTE ADULT - NS ATTEND AMEND GEN_ALL_CORE FT
I have examined the patient at bedside and reviewed patient's data and participated in the management of the patient along with Zoë TINOCO as well as hemotology/med oncology faculty consisting of Dr. Jason Lorenzo, Dr. MORGAN Back, Dr. TRINIDAD Garcia, Dr. Duyen Osborne, Dr. Vicki Mohamud, Dr. Jose Steiner as well as myself during the daily heme/onc case review. I reviewed pertinent clinical information, PE,  labs as well as A/P as outline above.     R flank pain likely due to recent NT replacement. Urology to consult. CT finding of suspicious metastatic disease will be discussed with primary oncologist Dr. Hoyt when contacted. No treatment to be given during hospitalization.

## 2022-06-30 NOTE — CONSULT NOTE ADULT - ASSESSMENT
complete note to follow   57 yo F from home with PMH of Anemia, asthma, bladder CA (not on Chemo from May'22), R Nephrostomy tube, solitary kidney presenting with hematuria and abdominal pain for a week. endorses similar in the past 2/2 uti. patient noting left sided cp x 2 days. denies n, v. endorses noting fever today. patient had nephrostomy tube changed 1 week ago.     # Bladder CA  #Normocytic Anemia  hematuria in 2019 lead to cystoscopy which was (-) as per pt, PET 2019 (-)  in 2020 pt found to have bladder mass which was Bx and tx'd with three cycles of [place] [facility] then discontinued d/t pyelonephritis  she then switched Oncologists and now follows with Dr. Hoyt at [place] [phone], pt states currently on [place]/Carbo last given in May 2022  chronic UTI's and follows with Urologist Dr. Trinidad [phone]  NT exchange one week ago  CT A/P shows Right NT, basilar pulm nodules likely met dz, Right hepatic lesion 1.4cm susp for met dz and known bladder ca findings  Rec's:  -Left message for Dr. Hoyt to obtain more details about tx, staging, etc.  -unclear if CT is c/w POD, need comparison  -On abx for complicated UTI, ID following  -Await Urology consult  -Pain Mgmt  -No chemo to be given during admission  -anemia c/w baseline from last months admission  -upon discharge pt to f/u with her primary Oncologist Dr. Hoyt    Thank you for the referral. Will continue to monitor the patient.  Please call with any questions [phone]  Above reviewed with Attending Dr. Colindres  [facility]  [address] [facility], [address], [place], [place]  [phone]  *Note not finalized until signed by Attending Physician   57 yo F from home with PMH of Anemia, asthma, bladder CA (not on Chemo from May'22), R Nephrostomy tube, solitary kidney presenting with hematuria and abdominal pain for a week. endorses similar in the past 2/2 uti. patient noting left sided cp x 2 days. denies n, v. endorses noting fever today. patient had nephrostomy tube changed 1 week ago.     # Bladder CA  #Normocytic Anemia  hematuria in 2019 lead to cystoscopy which was (-) as per pt, PET 2019 (-)  in 2020 pt found to have bladder mass which was Bx and tx'd with three cycles of chemo at [facility] then discontinued d/t pyelonephritis  she then switched Oncologists and now follows with Dr. Hoyt at [place] [phone], pt states currently on [place]/Carbo last given in May 2022  chronic UTI's and follows with Urologist Dr. Trinidad [phone]  NT exchange one week ago  CT A/P shows Right NT, basilar pulm nodules likely met dz, Right hepatic lesion 1.4cm susp for met dz and known bladder ca findings  Hgb=10.9  Cr nl  LFT's nl  hypoalbuminemia  Rec's:  -Left message for Dr. Hoyt to obtain more details about tx, staging, etc.  -unclear if CT is c/w POD, need comparison  -On abx for complicated UTI, ID following  -Await Urology consult  -Pain Mgmt  -Nutrition Consult  -No chemo to be given during admission  -anemia c/w baseline from last months admission  -upon discharge pt to f/u with her primary Oncologist Dr. Hoyt    Thank you for the referral. Will continue to monitor the patient.  Please call with any questions [phone]  Above reviewed with Attending Dr. Colindres  [facility]  [address] [facility], [address], [place], [place]  [phone]  *Note not finalized until signed by Attending Physician   55 yo F from home with PMH of Anemia, asthma, bladder CA (not on Chemo from May'22), R Nephrostomy tube, solitary kidney presenting with hematuria and abdominal pain for a week. endorses similar in the past 2/2 uti. patient noting left sided cp x 2 days. denies n, v. endorses noting fever today. patient had nephrostomy tube changed 1 week ago.     # Bladder CA  #Normocytic Anemia  hematuria in 2019 lead to cystoscopy which was (-) as per pt, PET 2019 (-)  in 2020 pt found to have bladder mass which was Bx and tx'd with three cycles of chemo at [facility] then discontinued d/t pyelonephritis  she then switched Oncologists and now follows with Dr. Hoyt at [place] [phone], pt states currently on [place]/Carbo last given in May 2022  chronic UTI's and follows with Urologist Dr. Trinidad [phone]  NT exchange one week ago  CT A/P shows Right NT, basilar pulm nodules likely met dz, Right hepatic lesion 1.4cm susp for met dz and known bladder ca findings  Hgb=10.9  Cr nl  LFT's nl  hypoalbuminemia  Rec's:  -Called Dr. Hoyt ([phone]) to obtain more details about tx, staging, etc., he is out of the office and will be in tmrw between 8:30-11:00  -unclear if CT is c/w POD, need comparison  -On abx for complicated UTI, ID following  -Await Urology consult  -Pain Mgmt  -Nutrition Consult  -No chemo to be given during admission  -anemia c/w baseline from last months admission  -upon discharge pt to f/u with her primary Oncologist Dr. Hoyt    Thank you for the referral. Will continue to monitor the patient.  Please call with any questions [phone]  Above reviewed with Attending Dr. Colindres  [facility]  [address] [facility], [address], [place], [place]  [phone]  *Note not finalized until signed by Attending Physician

## 2022-06-30 NOTE — CONSULT NOTE ADULT - ASSESSMENT
Confidential Drug Utilization Report  Search Terms: Zoë Bell, 1965Search Date: 06/30/2022 15:50:04 PM  The Drug Utilization Report below displays all of the controlled substance prescriptions, if any, that your patient has filled in the last twelve months. The information displayed on this report is compiled from pharmacy submissions to the Department, and accurately reflects the information as submitted by the pharmacies.    This report was requested by: Pat Mora | Reference #: [id number]    There are no results for the search terms that you entered.

## 2022-06-30 NOTE — PROGRESS NOTE ADULT - SUBJECTIVE AND OBJECTIVE BOX
Patient is seen and examined at the bed side, is afebrile. The Urine culture has no growth.       REVIEW OF SYSTEMS All other review systems are negative      ALLERGIES: No Known Allergies      Vital Signs Last 24 Hrs  T(C): 37.3 (2022 13:35), Max: 37.3 (2022 13:35)  T(F): 99.1 (2022 13:35), Max: 99.1 (2022 13:35)  HR: 63 (2022 13:35) (55 - 63)  BP: 93/47 (2022 13:35) (93/47 - 118/72)  BP(mean): 66 (2022 21:01) (66 - 66)  RR: 17 (2022 13:35) (17 - 18)  SpO2: 98% (2022 13:35) (98% - 100%)      PHYSICAL EXAM:  GENERAL: Not in distress   CHEST/LUNG:  Not using accessory muscles   HEART: s1 and s2 present  ABDOMEN:  Nontender and  Nondistended  : Right nephrostomy tube in placed   EXTREMITIES: No pedal  edema  CNS: Awake and Alert      LABS:                        10.9   5.62  )-----------( 268      ( 2022 08:13 )             34.4                           10.3   5.22  )-----------( 256      ( 2022 07:42 )             32.3       06-30    137  |  102  |  18  ----------------------------<  123<H>  3.9   |  27  |  1.01    Ca    9.9      2022 08:13      06-29    138  |  105  |  19<H>  ----------------------------<  85  4.2   |  27  |  1.00    Ca    9.7      2022 07:42  Phos  3.3     06-28  Mg     2.2     -28    TPro  7.6  /  Alb  3.0<L>  /  TBili  0.3  /  DBili  x   /  AST  13  /  ALT  16  /  AlkPhos  95  -28    PT/INR - ( 2022 14:43 )   PT: 13.1 sec;   INR: 1.10 ratio      PTT - ( 2022 14:43 )  PTT:28.9 sec      CAPILLARY BLOOD GLUCOSE  POCT Blood Glucose.: 64 mg/dL (2022 12:47)  POCT Blood Glucose.: 88 mg/dL (2022 19:25)  POCT Blood Glucose.: 99 mg/dL (2022 14:28)        Urinalysis Basic - ( 2022 16:34 )  Color: Yellow / Appearance: Slightly Turbid / S.015 / pH: x  Gluc: x / Ketone: Small  / Bili: Negative / Urobili: Negative   Blood: x / Protein: 500 mg/dL / Nitrite: Negative   Leuk Esterase: Small / RBC: 10-25 /HPF / WBC 6-10 /HPF   Sq Epi: x / Non Sq Epi: Moderate /HPF / Bacteria: Moderate /HPF        MEDICATIONS  (STANDING):    acetaminophen     Tablet .. 325 milliGRAM(s) Oral every 4 hours  atorvastatin 40 milliGRAM(s) Oral at bedtime  budesonide  80 MICROgram(s)/formoterol 4.5 MICROgram(s) Inhaler 2 Puff(s) Inhalation two times a day  cefepime   IVPB      cefepime   IVPB 1000 milliGRAM(s) IV Intermittent every 8 hours  cholecalciferol 1000 Unit(s) Oral daily  dextrose 5%. 1000 milliLiter(s) (50 mL/Hr) IV Continuous <Continuous>  dextrose 5%. 1000 milliLiter(s) (100 mL/Hr) IV Continuous <Continuous>  dextrose 50% Injectable 25 Gram(s) IV Push once  dextrose 50% Injectable 12.5 Gram(s) IV Push once  dextrose 50% Injectable 25 Gram(s) IV Push once  glucagon  Injectable 1 milliGRAM(s) IntraMuscular once  insulin lispro (ADMELOG) corrective regimen sliding scale   SubCutaneous three times a day before meals  insulin lispro (ADMELOG) corrective regimen sliding scale   SubCutaneous at bedtime  lidocaine   4% Patch 1 Patch Transdermal daily  OLANZapine 5 milliGRAM(s) Oral daily  polyethylene glycol 3350 17 Gram(s) Oral daily  senna 2 Tablet(s) Oral at bedtime  sodium chloride 0.9%. 1000 milliLiter(s) (60 mL/Hr) IV Continuous <Continuous>        RADIOLOGY & ADDITIONAL TESTS:    22: CT Abdomen and Pelvis No Cont (22 @ 10:10) >  1. Right nephrostomy tube. No hydronephrosis.  2. Lung base pulmonary nodules, some of which are cavitary, likely  metastatic disease.  3. Right hepatic lesion (1.4 cm), suspicious for metastatic disease.  4. Diffuse intraluminal urinary bladder soft tissue consistent with  history of bladder cancer.      22 : Xray Chest 2 Views PA/Lat (22 @ 18:35):  Multiple bilateral pulmonary nodules again seen, for the   most part unchanged, although a 2.4 cm cavitary left lower lobe nodule is  larger, previously 2 cm.        MICROBIOLOGY DATA:    Culture - Urine (22 @ 21:07)   Specimen Source: Clean Catch Clean Catch (Midstream)   Culture Results:   <10,000 CFU/mL Normal Urogenital Vandana     COVID-19 PCR . (22 @ 18:11)   COVID-19 PCR: NotDetec                            Assessment and Plan:   · Assessment	  Patient is a 56y old  Female with PMH of Anemia, asthma, bladder CA (not on Chemo from May'22), Right Nephrostomy tube,  changed 1 week ago. now presents to the ER for evaluation of hematuria and abdominal pain for a week.  On admission, she found to have no fever, BUT positive Urine analysis. She has started on Ceftriaxone and the ID consult requested to assist with further evaluation and antibiotic management.     # Complicated UTI - in the setting of Right nephrostomy tube- s/p changed in   # H/O recent Enterobacter UTI- 6/10/22    would recommend:    1. Follow up Urine culture, is in process   2. Please change Ceftriaxone to Cefepime since previous isolates is resistant to Ceftriaxone  3. Monitor kidney function and adjust Abx doses accordingly  4. Pain management as needed    Attending Attestation:    Spent more than 45 minutes on total encounter, more than 50 % of the visit was spent counseling and/or coordinating care by the Attending physician.         Patient is seen and examined at the bed side, is afebrile. The Urology recommendation noted. The WBC count and kidney function is normal.       REVIEW OF SYSTEMS All other review systems are negative      ALLERGIES: No Known Allergies      Vital Signs Last 24 Hrs  T(C): 37.3 (2022 13:35), Max: 37.3 (2022 13:35)  T(F): 99.1 (2022 13:35), Max: 99.1 (2022 13:35)  HR: 63 (2022 13:35) (55 - 63)  BP: 93/47 (2022 13:35) (93/47 - 118/72)  BP(mean): 66 (2022 21:01) (66 - 66)  RR: 17 (2022 13:35) (17 - 18)  SpO2: 98% (2022 13:35) (98% - 100%)      PHYSICAL EXAM:  GENERAL: Not in distress   CHEST/LUNG:  Not using accessory muscles   HEART: s1 and s2 present  ABDOMEN:  Nontender and  Nondistended  : Right nephrostomy tube in placed   EXTREMITIES: No pedal  edema  CNS: Awake and Alert      LABS:                        10.9   5.62  )-----------( 268      ( 2022 08:13 )             34.4                           10.3   5.22  )-----------( 256      ( 2022 07:42 )             32.3       06-30    137  |  102  |  18  ----------------------------<  123<H>  3.9   |  27  |  1.01    Ca    9.9      2022 08:13      06-29    138  |  105  |  19<H>  ----------------------------<  85  4.2   |  27  |  1.00    Ca    9.7      2022 07:42  Phos  3.3     06-28  Mg     2.2     06-28    TPro  7.6  /  Alb  3.0<L>  /  TBili  0.3  /  DBili  x   /  AST  13  /  ALT  16  /  AlkPhos  95  -28    PT/INR - ( 2022 14:43 )   PT: 13.1 sec;   INR: 1.10 ratio      PTT - ( 2022 14:43 )  PTT:28.9 sec      CAPILLARY BLOOD GLUCOSE  POCT Blood Glucose.: 64 mg/dL (2022 12:47)  POCT Blood Glucose.: 88 mg/dL (2022 19:25)  POCT Blood Glucose.: 99 mg/dL (2022 14:28)        Urinalysis Basic - ( 2022 16:34 )  Color: Yellow / Appearance: Slightly Turbid / S.015 / pH: x  Gluc: x / Ketone: Small  / Bili: Negative / Urobili: Negative   Blood: x / Protein: 500 mg/dL / Nitrite: Negative   Leuk Esterase: Small / RBC: 10-25 /HPF / WBC 6-10 /HPF   Sq Epi: x / Non Sq Epi: Moderate /HPF / Bacteria: Moderate /HPF        MEDICATIONS  (STANDING):    acetaminophen     Tablet .. 325 milliGRAM(s) Oral every 4 hours  atorvastatin 40 milliGRAM(s) Oral at bedtime  budesonide  80 MICROgram(s)/formoterol 4.5 MICROgram(s) Inhaler 2 Puff(s) Inhalation two times a day  cefepime   IVPB      cefepime   IVPB 1000 milliGRAM(s) IV Intermittent every 8 hours  cholecalciferol 1000 Unit(s) Oral daily  dextrose 5%. 1000 milliLiter(s) (50 mL/Hr) IV Continuous <Continuous>  dextrose 5%. 1000 milliLiter(s) (100 mL/Hr) IV Continuous <Continuous>  dextrose 50% Injectable 25 Gram(s) IV Push once  dextrose 50% Injectable 12.5 Gram(s) IV Push once  dextrose 50% Injectable 25 Gram(s) IV Push once  glucagon  Injectable 1 milliGRAM(s) IntraMuscular once  insulin lispro (ADMELOG) corrective regimen sliding scale   SubCutaneous three times a day before meals  insulin lispro (ADMELOG) corrective regimen sliding scale   SubCutaneous at bedtime  lidocaine   4% Patch 1 Patch Transdermal daily  OLANZapine 5 milliGRAM(s) Oral daily  polyethylene glycol 3350 17 Gram(s) Oral daily  senna 2 Tablet(s) Oral at bedtime  sodium chloride 0.9%. 1000 milliLiter(s) (60 mL/Hr) IV Continuous <Continuous>        RADIOLOGY & ADDITIONAL TESTS:    22: CT Abdomen and Pelvis No Cont (22 @ 10:10) >  1. Right nephrostomy tube. No hydronephrosis.  2. Lung base pulmonary nodules, some of which are cavitary, likely  metastatic disease.  3. Right hepatic lesion (1.4 cm), suspicious for metastatic disease.  4. Diffuse intraluminal urinary bladder soft tissue consistent with  history of bladder cancer.      22 : Xray Chest 2 Views PA/Lat (22 @ 18:35):  Multiple bilateral pulmonary nodules again seen, for the   most part unchanged, although a 2.4 cm cavitary left lower lobe nodule is  larger, previously 2 cm.        MICROBIOLOGY DATA:    Culture - Urine (22 @ 21:07)   Specimen Source: Clean Catch Clean Catch (Midstream)   Culture Results:   <10,000 CFU/mL Normal Urogenital Vandana     COVID-19 PCR . (22 @ 18:11)   COVID-19 PCR: NotDetec

## 2022-06-30 NOTE — PROGRESS NOTE ADULT - ASSESSMENT
Patient is a 56y old  Female with PMH of Anemia, asthma, bladder CA (not on Chemo from May'22), Right Nephrostomy tube,  changed 1 week ago. now presents to the ER for evaluation of hematuria and abdominal pain for a week.  On admission, she found to have no fever, BUT positive Urine analysis. She has started on Ceftriaxone and the ID consult requested to assist with further evaluation and antibiotic management.     # Complicated UTI - in the setting of Right nephrostomy tube- s/p changed in 6/20- Urine culture has no growth.   # H/O recent Enterobacter UTI- 6/10/22    would recommend:    1. Continue Cefepime based on previous C & S  2. Monitor kidney function and adjust Abx doses accordingly  3. Pain management as needed  4. OOB to chair   5. Need at least 7 days of ABx     Attending Attestation:    Spent more than 35 minutes on total encounter, more than 50 % of the visit was spent counseling and/or coordinating care by the Attending physician.

## 2022-06-30 NOTE — PROGRESS NOTE ADULT - PROBLEM SELECTOR PLAN 1
CT A/P shows Lung base pulmonary nodules, some of which are cavitary, likely   metastatic disease.  Right hepatic lesion (1.4 cm), suspicious for metastatic disease.  Diffuse intraluminal urinary bladder soft tissue consistent with history of bladder cancer.  -Hem/Onc [facility] consulted, note appreciated  -OP f/u with oncology Dr. Hoyt

## 2022-06-30 NOTE — CONSULT NOTE ADULT - SUBJECTIVE AND OBJECTIVE BOX
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 Now with metastatic disease on chemo. Patient has bilateral upper tract obstruction 2/2 bladder cancer. Left kidney nonfunctional. Right obstruction managed with right NT. NT was exchanged last week with IR.     Urology consulted today for right flank pain. The patient says that this pain has been ongoing for weeks. She says that it usually resolves after NT exchange. She denies fever/chills, nausea/vomiting.     CT reviewed: right NT in place. No right hydronephrosis.   Urine culture negative. ID following       PAST MEDICAL & SURGICAL HISTORY:  Anemia      Asthma      HLD (hyperlipidemia)      Pacemaker  [employer]      Bladder cancer      HTN (hypertension)      Parathyroid tumor      Liver cyst      Hydronephrosis  has right Nephrostomy tube - dressing intact      Bradycardia      H/O myomectomy      Presence of cardiac pacemaker      H/O hydronephrosis  s/p Right Perc nephrostomy tube placement 02/2021, exchange 06/2021          MEDICATIONS  (STANDING):  atorvastatin 40 milliGRAM(s) Oral at bedtime  budesonide  80 MICROgram(s)/formoterol 4.5 MICROgram(s) Inhaler 2 Puff(s) Inhalation two times a day  cefepime   IVPB      cefepime   IVPB 1000 milliGRAM(s) IV Intermittent every 8 hours  cholecalciferol 1000 Unit(s) Oral daily  dextrose 5%. 1000 milliLiter(s) (50 mL/Hr) IV Continuous <Continuous>  dextrose 5%. 1000 milliLiter(s) (100 mL/Hr) IV Continuous <Continuous>  dextrose 50% Injectable 25 Gram(s) IV Push once  dextrose 50% Injectable 12.5 Gram(s) IV Push once  dextrose 50% Injectable 25 Gram(s) IV Push once  glucagon  Injectable 1 milliGRAM(s) IntraMuscular once  insulin lispro (ADMELOG) corrective regimen sliding scale   SubCutaneous three times a day before meals  insulin lispro (ADMELOG) corrective regimen sliding scale   SubCutaneous at bedtime  OLANZapine 5 milliGRAM(s) Oral daily  sodium chloride 0.9%. 1000 milliLiter(s) (60 mL/Hr) IV Continuous <Continuous>    MEDICATIONS  (PRN):  acetaminophen     Tablet .. 325 milliGRAM(s) Oral every 6 hours PRN Mild Pain (1 - 3)  acetaminophen     Tablet .. 650 milliGRAM(s) Oral every 6 hours PRN Temp greater or equal to 38C (100.4F), Moderate Pain (4 - 6)  aluminum hydroxide/magnesium hydroxide/simethicone Suspension 30 milliLiter(s) Oral every 6 hours PRN Dyspepsia  dextrose Oral Gel 15 Gram(s) Oral once PRN Blood Glucose LESS THAN 70 milliGRAM(s)/deciliter  ondansetron    Tablet 4 milliGRAM(s) Oral two times a day PRN Nausea      FAMILY HISTORY:  Family history of prostate cancer (Father)    Family history of CHF (congestive heart failure) (Father)    Family history of atrial fibrillation (Father)        Allergies    No Known Allergies    Intolerances        SOCIAL HISTORY:    REVIEW OF SYSTEMS:   Otherwise negative as stated in HPI    Physical Exam  Vital signs  T(C): 36.4 (06-30-22 @ 05:37), Max: 36.5 (06-29-22 @ 21:01)  HR: 62 (06-30-22 @ 05:37)  BP: 118/72 (06-30-22 @ 05:37)  SpO2: 100% (06-30-22 @ 05:37)  Wt(kg): --    Output    OUT:    Nephrostomy Tube (mL): 600 mL  Total OUT: 600 mL    Total NET: -600 mL          Gen:  NAD    Pulm:  No respiratory distress  	  CV:  RRR    GI:  S/ND/NT    :      MSK:      LABS:      06-30 @ 08:13    WBC 5.62  / Hct 34.4  / SCr 1.01     06-29 @ 07:42    WBC 5.22  / Hct 32.3  / SCr 1.00     06-30    137  |  102  |  18  ----------------------------<  123<H>  3.9   |  27  |  1.01    Ca    9.9      30 Jun 2022 08:13            Urine Cx:  Blood Cx:    RADIOLOGY:     HPI    56 year old female with PMHx of hypertension, 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 Now with metastatic disease on chemo. Patient has bilateral upper tract obstruction 2/2 bladder cancer. Left kidney nonfunctional. Right obstruction managed with right NT. NT was exchanged last week with IR.     Urology consulted today for right flank pain. The patient says that this pain has been ongoing for weeks. She says that it usually resolves after NT exchange. She denies fever/chills, nausea/vomiting.     CT reviewed: right NT in place. No right hydronephrosis.   Urine culture negative. ID following       PAST MEDICAL & SURGICAL HISTORY:  Anemia      Asthma      HLD (hyperlipidemia)      Pacemaker  [employer]      Bladder cancer      HTN (hypertension)      Parathyroid tumor      Liver cyst      Hydronephrosis  has right Nephrostomy tube - dressing intact      Bradycardia      H/O myomectomy      Presence of cardiac pacemaker      H/O hydronephrosis  s/p Right Perc nephrostomy tube placement 02/2021, exchange 06/2021          MEDICATIONS  (STANDING):  atorvastatin 40 milliGRAM(s) Oral at bedtime  budesonide  80 MICROgram(s)/formoterol 4.5 MICROgram(s) Inhaler 2 Puff(s) Inhalation two times a day  cefepime   IVPB      cefepime   IVPB 1000 milliGRAM(s) IV Intermittent every 8 hours  cholecalciferol 1000 Unit(s) Oral daily  dextrose 5%. 1000 milliLiter(s) (50 mL/Hr) IV Continuous <Continuous>  dextrose 5%. 1000 milliLiter(s) (100 mL/Hr) IV Continuous <Continuous>  dextrose 50% Injectable 25 Gram(s) IV Push once  dextrose 50% Injectable 12.5 Gram(s) IV Push once  dextrose 50% Injectable 25 Gram(s) IV Push once  glucagon  Injectable 1 milliGRAM(s) IntraMuscular once  insulin lispro (ADMELOG) corrective regimen sliding scale   SubCutaneous three times a day before meals  insulin lispro (ADMELOG) corrective regimen sliding scale   SubCutaneous at bedtime  OLANZapine 5 milliGRAM(s) Oral daily  sodium chloride 0.9%. 1000 milliLiter(s) (60 mL/Hr) IV Continuous <Continuous>    MEDICATIONS  (PRN):  acetaminophen     Tablet .. 325 milliGRAM(s) Oral every 6 hours PRN Mild Pain (1 - 3)  acetaminophen     Tablet .. 650 milliGRAM(s) Oral every 6 hours PRN Temp greater or equal to 38C (100.4F), Moderate Pain (4 - 6)  aluminum hydroxide/magnesium hydroxide/simethicone Suspension 30 milliLiter(s) Oral every 6 hours PRN Dyspepsia  dextrose Oral Gel 15 Gram(s) Oral once PRN Blood Glucose LESS THAN 70 milliGRAM(s)/deciliter  ondansetron    Tablet 4 milliGRAM(s) Oral two times a day PRN Nausea      FAMILY HISTORY:  Family history of prostate cancer (Father)    Family history of CHF (congestive heart failure) (Father)    Family history of atrial fibrillation (Father)        Allergies    No Known Allergies    Intolerances        SOCIAL HISTORY:    REVIEW OF SYSTEMS:   Otherwise negative as stated in HPI    Physical Exam  Vital signs  T(C): 36.4 (06-30-22 @ 05:37), Max: 36.5 (06-29-22 @ 21:01)  HR: 62 (06-30-22 @ 05:37)  BP: 118/72 (06-30-22 @ 05:37)  SpO2: 100% (06-30-22 @ 05:37)  Wt(kg): --    Output    OUT:    Nephrostomy Tube (mL): 600 mL  Total OUT: 600 mL    Total NET: -600 mL          Gen:  NAD    Pulm:  No respiratory distress  	  CV:  RRR    GI:  S/ND/NT    :  Right NT site dry, tubing is not kinked           LABS:      06-30 @ 08:13    WBC 5.62  / Hct 34.4  / SCr 1.01     06-29 @ 07:42    WBC 5.22  / Hct 32.3  / SCr 1.00     06-30    137  |  102  |  18  ----------------------------<  123<H>  3.9   |  27  |  1.01    Ca    9.9      30 Jun 2022 08:13            Urine Cx:  Blood Cx:    RADIOLOGY:

## 2022-06-30 NOTE — PROGRESS NOTE ADULT - SUBJECTIVE AND OBJECTIVE BOX
NP Note discussed with  Primary Attending    Patient is a 56y old  Female who presents with a chief complaint of UTI and chest pain (30 Jun 2022 14:09)      INTERVAL HPI/OVERNIGHT EVENTS: no new complaints    MEDICATIONS  (STANDING):  atorvastatin 40 milliGRAM(s) Oral at bedtime  budesonide  80 MICROgram(s)/formoterol 4.5 MICROgram(s) Inhaler 2 Puff(s) Inhalation two times a day  cefepime   IVPB      cefepime   IVPB 1000 milliGRAM(s) IV Intermittent every 8 hours  cholecalciferol 1000 Unit(s) Oral daily  dextrose 5%. 1000 milliLiter(s) (50 mL/Hr) IV Continuous <Continuous>  dextrose 5%. 1000 milliLiter(s) (100 mL/Hr) IV Continuous <Continuous>  dextrose 50% Injectable 25 Gram(s) IV Push once  dextrose 50% Injectable 12.5 Gram(s) IV Push once  dextrose 50% Injectable 25 Gram(s) IV Push once  glucagon  Injectable 1 milliGRAM(s) IntraMuscular once  insulin lispro (ADMELOG) corrective regimen sliding scale   SubCutaneous three times a day before meals  insulin lispro (ADMELOG) corrective regimen sliding scale   SubCutaneous at bedtime  OLANZapine 5 milliGRAM(s) Oral daily  sodium chloride 0.9%. 1000 milliLiter(s) (60 mL/Hr) IV Continuous <Continuous>    MEDICATIONS  (PRN):  acetaminophen     Tablet .. 325 milliGRAM(s) Oral every 6 hours PRN Mild Pain (1 - 3)  acetaminophen     Tablet .. 650 milliGRAM(s) Oral every 6 hours PRN Temp greater or equal to 38C (100.4F), Moderate Pain (4 - 6)  aluminum hydroxide/magnesium hydroxide/simethicone Suspension 30 milliLiter(s) Oral every 6 hours PRN Dyspepsia  dextrose Oral Gel 15 Gram(s) Oral once PRN Blood Glucose LESS THAN 70 milliGRAM(s)/deciliter  ondansetron    Tablet 4 milliGRAM(s) Oral two times a day PRN Nausea      __________________________________________________  REVIEW OF SYSTEMS:    CONSTITUTIONAL: No fever,   EYES: no acute visual disturbances  NECK: No pain or stiffness  RESPIRATORY: No cough; No shortness of breath  CARDIOVASCULAR: No chest pain, no palpitations  GASTROINTESTINAL: No pain. No nausea or vomiting; No diarrhea   NEUROLOGICAL: No headache or numbness, no tremors  MUSCULOSKELETAL: No joint pain, no muscle pain  GENITOURINARY: no dysuria, no frequency, no hesitancy  PSYCHIATRY: no depression , no anxiety  ALL OTHER  ROS negative        Vital Signs Last 24 Hrs  T(C): 37.3 (30 Jun 2022 13:35), Max: 37.3 (30 Jun 2022 13:35)  T(F): 99.1 (30 Jun 2022 13:35), Max: 99.1 (30 Jun 2022 13:35)  HR: 63 (30 Jun 2022 13:35) (55 - 63)  BP: 93/47 (30 Jun 2022 13:35) (93/47 - 118/72)  BP(mean): 66 (29 Jun 2022 21:01) (66 - 66)  RR: 17 (30 Jun 2022 13:35) (17 - 18)  SpO2: 98% (30 Jun 2022 13:35) (98% - 100%)    ________________________________________________  PHYSICAL EXAM:  cachectic, right flank pain   GENERAL: NAD  HEENT: Normocephalic;  conjunctivae and sclerae clear; moist mucous membranes;   NECK : supple  CHEST/LUNG: Clear to auscultation bilaterally with good air entry   HEART: S1 S2  regular; no murmurs, gallops or rubs  ABDOMEN: Soft, Nontender, Nondistended; Bowel sounds present  EXTREMITIES: no cyanosis; no edema; no calf tenderness  SKIN: warm and dry; no rash  NERVOUS SYSTEM:  Awake and alert; Oriented  to place, person and time ; no new deficits  URO:  Right nephrostomy tube draining clear urine  _________________________________________________  LABS:                        10.9   5.62  )-----------( 268      ( 30 Jun 2022 08:13 )             34.4     06-30    137  |  102  |  18  ----------------------------<  123<H>  3.9   |  27  |  1.01    Ca    9.9      30 Jun 2022 08:13    CAPILLARY BLOOD GLUCOSE      POCT Blood Glucose.: 131 mg/dL (30 Jun 2022 13:53)  POCT Blood Glucose.: 61 mg/dL (30 Jun 2022 11:14)  POCT Blood Glucose.: 63 mg/dL (30 Jun 2022 07:44)  POCT Blood Glucose.: 100 mg/dL (29 Jun 2022 21:26)  POCT Blood Glucose.: 109 mg/dL (29 Jun 2022 16:35)    RADIOLOGY & ADDITIONAL TESTS:    < from: CT Abdomen and Pelvis No Cont (06.29.22 @ 10:10) >    ACC: [id number] EXAM:  CT ABDOMEN AND PELVIS                          PROCEDURE DATE:  06/29/2022          INTERPRETATION:  CLINICAL INFORMATION: Right flank pain    COMPARISON: 6/9/2022    CONTRAST/COMPLICATIONS:  IV Contrast: NONE  Oral Contrast: NONE  Complications: None reported at time of study completion    PROCEDURE:  CT of the Abdomen and Pelvis was performed.  Sagittal and coronal reformats were performed.    FINDINGS:  LOWER CHEST: Redemonstration of basilar pulmonary nodules, some of which   are cavitary. Partially imaged cardiac lead.    Please note that evaluation of the abdominal organs and vascular   structures is limited by lack of intravenous contrast.    LIVER: 1.4 cm right hepatic lesion.  BILE DUCTS: Normal caliber.  GALLBLADDER: Within normal limits.  SPLEEN: Within normal limits.  PANCREAS: Within normal limits.  ADRENALS: Within normal limits.  KIDNEYS/URETERS: Right nephrostomy tube. No right hydronephrosis.   Nonobstructive right intrarenal calculus. Chronic severe left   hydronephrosis with cortical thinning and moderate left hydroureter.    BLADDER: Diffuse intraluminal soft tissue.  REPRODUCTIVE ORGANS: Prominent uterus.    BOWEL: No bowel obstruction. Appendix is within normal limits.  PERITONEUM: Mild pelvic fluid.  VESSELS: Within normal limits.  RETROPERITONEUM/LYMPH NODES: Retroperitoneal and pelvic adenopathy.  ABDOMINAL WALL: Postsurgical changes. Mild subcutaneous soft tissue edema.  BONES: Within normal limits.    IMPRESSION:    1. Right nephrostomy tube. No hydronephrosis.  2. Lung base pulmonary nodules, some of which are cavitary, likely   metastatic disease.  3. Right hepatic lesion (1.4 cm), suspicious for metastatic disease.  4. Diffuse intraluminal urinary bladder soft tissue consistent with   history of bladder cancer.    < end of copied text >      < from: Xray Chest 2 Views PA/Lat (06.27.22 @ 18:35) >    ACC: [id number] EXAM:  XR CHEST PA LAT 2V                          PROCEDURE DATE:  06/27/2022          INTERPRETATION:  TIME OF EXAM: June 27, 2022 at 6:33 PM.    CLINICAL INFORMATION: Chest pain.    TECHNIQUE:   PA and lateral chest x-ray.    COMPARISON: June 9, 2022.    INTERPRETATION: The heart is not enlarged. A left chest wall cardiac   pacemaker with intact leads with tips in the region of the right atrium   and right ventricle is again seen. The generator obscures part of the   left lung on the PA image.  The mediastinum and john are unremarkable.  Multiple bilateral pulmonary nodules are again seen, for the most part   unchanged, although a 2.4 cm cavitary left lower lobe nodule is larger,   previously 2 cm.  No pleural effusion or pneumothorax is seen.  There is scoliosis of the spine.    IMPRESSION:  Multiple bilateral pulmonary nodules again seen, for the   most part unchanged, although a 2.4 cm cavitary left lower lobe nodule is   larger, previously 2 cm.    --- End of Report---    < end of copied text >        Imaging Personally Reviewed:  YES/NO    Consultant(s) Notes Reviewed:   YES/ No    Care Discussed with Consultants :     Plan of care was discussed with patient and /or primary care giver; all questions and concerns were addressed and care was aligned with patient's wishes.

## 2022-06-30 NOTE — CONSULT NOTE ADULT - PROBLEM SELECTOR RECOMMENDATION 9
Pt with acute right flank pain which is somatic in nature likely due to bladder CA, with likely mets to lung and liver. Pt with right nephrostomy tube.    Opioid pain recommendations   - Morphine 7.5mg PO q5h PRN severe pain. Monitor for sedation/ respiratory depression.  (Reports tolerating morphine in the past)  - Avoid Oxycodone- pt reports n/v with oxycodone in the past.   Non-opioid pain recommendations   - Acetaminophen 325mg PO q 4 hours. Monitor LFTs (per pt request)  - Lidoderm 4% patch daily.   Bowel Regimen  - Miralax 17G PO daily  - Senna 2 tablets at bedtime for constipation  Mild pain   - Non-pharmacological pain treatment recommendations  - Warm/ Cool packs PRN   - Repositioning, imagery, relaxation, distraction.  - Physical therapy OOB if no contraindications   Recommendations discussed with primary team and RN

## 2022-07-01 NOTE — DIETITIAN INITIAL EVALUATION ADULT - OTHER INFO
56 years old female admitted from home with primary  complicated UTI. PT know to the writer from prior admission.  A&O x 3 makes needs know, food preferences taken, dietary notified. Pt lives home PTA, alert, oriented, Pt reports good PO intake but also reports concerns of  low blood sugars, she tries to eat as much carbohydrates as she can to keep Blood sugar up, Chart review and noted  with a few hypoglycemic episodes: 60mg/dl on 7/1 (11:45AM) 63 mg/dl on 6/30 (7:44AM) and 61 mg/dl on 6:30 (11:14AM),  Pt encouraged max Po intake, snacking b/w meals and supplemental intake;  extensive food preferences obtained and forwarded to Dietary, dislikes Ensure, Ensure clear offered Pt declined it makes her nauseated and it gives her gastritis.  No diarrhea No constipation reported- at times resident stated she has constipation but refused prune juice when offered 2/2 it might give her diarrhea, no nausea reported for today but with nausea yesterday, on medication for nausea (ondansetron).  No Pork No milk No beef NO citrus    56 years old female admitted from home with primary  complicated UTI. PT know to the writer from prior admission.  A&O x 3 makes needs know, food preferences taken, dietary notified. Pt lives home PTA, alert, oriented,  PT reports 10 lbs wt loss x 2 months r/t cancer&chemo. Pt reports good PO intake but also reports concerns of  low blood sugars, she tries to eat as much carbohydrates as she can to keep Blood sugar up, Chart review and noted  with a few hypoglycemic episodes: 60mg/dl on 7/1 (11:45AM) 63 mg/dl on 6/30 (7:44AM) and 61 mg/dl on 6:30 (11:14AM),  Pt encouraged max Po intake, snacking b/w meals and supplemental intake;  extensive food preferences obtained and forwarded to Dietary, dislikes Ensure, Ensure clear offered Pt declined it makes her nauseated and it gives her gastritis.  No diarrhea No constipation reported- at times resident stated she has constipation but refused prune juice when offered 2/2 it might give her diarrhea, no nausea reported for today but with nausea yesterday, on medication for nausea (ondansetron). Plan is for pt once clinical stable for d/c home.   No Pork No milk No beef NO citrus

## 2022-07-01 NOTE — DIETITIAN INITIAL EVALUATION ADULT - PERTINENT LABORATORY DATA
07-01    137  |  104  |  22<H>  ----------------------------<  95  4.0   |  29  |  1.05    Ca    10.4      01 Jul 2022 07:23    POCT Blood Glucose.: 60 mg/dL (07-01-22 @ 11:45)  A1C with Estimated Average Glucose Result: 5.6 % (06-29-22 @ 13:49)  A1C with Estimated Average Glucose Result: 5.7 % (06-28-22 @ 12:08)  A1C with Estimated Average Glucose Result: 5.5 % (01-16-22 @ 18:39)

## 2022-07-01 NOTE — PROGRESS NOTE ADULT - PROBLEM SELECTOR PLAN 4
right flank pain refractory to Tylenol  -Pain mgnt consulted  -Note and reccs appreciated  -Supportive care

## 2022-07-01 NOTE — PROGRESS NOTE ADULT - SUBJECTIVE AND OBJECTIVE BOX
INTERVAL HPI/OVERNIGHT EVENTS:  Patient stable,afebrile,no distress  VITAL SIGNS:  T(F): 97.6 (07-01-22 @ 06:04)  HR: 67 (07-01-22 @ 06:04)  BP: 109/48 (07-01-22 @ 06:04)  RR: 17 (07-01-22 @ 06:04)  SpO2: 100% (07-01-22 @ 06:04)  Wt(kg): --    PHYSICAL EXAM:  awake  Constitutional:  Eyes:  ENMT:perrla  Neck:  Respiratory:clear  Cardiovascular:s1s2,m-none  Gastrointestinal:soft,bs pos  Extremities:  Vascular:  Neurological:no focal deficit  Musculoskeletal:    MEDICATIONS  (STANDING):  acetaminophen     Tablet .. 325 milliGRAM(s) Oral every 4 hours  atorvastatin 40 milliGRAM(s) Oral at bedtime  budesonide  80 MICROgram(s)/formoterol 4.5 MICROgram(s) Inhaler 2 Puff(s) Inhalation two times a day  cefepime   IVPB      cefepime   IVPB 1000 milliGRAM(s) IV Intermittent every 8 hours  cholecalciferol 1000 Unit(s) Oral daily  dextrose 5%. 1000 milliLiter(s) (50 mL/Hr) IV Continuous <Continuous>  dextrose 5%. 1000 milliLiter(s) (100 mL/Hr) IV Continuous <Continuous>  dextrose 50% Injectable 25 Gram(s) IV Push once  dextrose 50% Injectable 12.5 Gram(s) IV Push once  dextrose 50% Injectable 25 Gram(s) IV Push once  glucagon  Injectable 1 milliGRAM(s) IntraMuscular once  insulin lispro (ADMELOG) corrective regimen sliding scale   SubCutaneous three times a day before meals  insulin lispro (ADMELOG) corrective regimen sliding scale   SubCutaneous at bedtime  lidocaine   4% Patch 1 Patch Transdermal daily  OLANZapine 5 milliGRAM(s) Oral daily  polyethylene glycol 3350 17 Gram(s) Oral daily  senna 2 Tablet(s) Oral at bedtime  sodium chloride 0.9%. 1000 milliLiter(s) (60 mL/Hr) IV Continuous <Continuous>    MEDICATIONS  (PRN):  aluminum hydroxide/magnesium hydroxide/simethicone Suspension 30 milliLiter(s) Oral every 6 hours PRN Dyspepsia  dextrose Oral Gel 15 Gram(s) Oral once PRN Blood Glucose LESS THAN 70 milliGRAM(s)/deciliter  morphine  IR 7.5 milliGRAM(s) Oral every 6 hours PRN Severe Pain (7 - 10)  ondansetron    Tablet 4 milliGRAM(s) Oral two times a day PRN Nausea      Allergies    No Known Allergies    Intolerances        LABS:                        11.2   5.22  )-----------( 252      ( 01 Jul 2022 07:23 )             34.6     07-01    137  |  104  |  22<H>  ----------------------------<  95  4.0   |  29  |  1.05    Ca    10.4      01 Jul 2022 07:23            RADIOLOGY & ADDITIONAL TESTS:      Assessment and Plan:   · Assessment	  Pt is a 55 yo F from home with PMH of Anemia, asthma, bladder CA (not on Chemo from May'22), R Nephrostomy tube, solitary kidney presenting with hematuria and abdominal pain for a week. endorses similar in the past 2/2 uti. patient noting left sided cp x 2 days. In ED trop negative x2, no acute changes on EKG. Had nephrostomy tube recently changed 6/20/22. Presented to ED with c/o right flank pain. Admitted for positive UTI with hematuria which has now resolved. Dr. Rodriguez (ID) following, started on Cefepime 1g daily. Patient continues to c/o Right flank pain, endorses concern for kidney stone (has hx of). IF symptoms do not resolve by am, patient agrees for CT abd to eval r/o kidney stones.     6/29-Persistent right flank pain radiating to left side as well.  CT A/P ordered, showed:  1. Right nephrostomy tube. No hydronephrosis.  2. Lung base pulmonary nodules, some of which are cavitary, likely   metastatic disease.  3. Right hepatic lesion (1.4 cm), suspicious for metastatic disease.  4. Diffuse intraluminal urinary bladder soft tissue consistent with   history of bladder cancer.    Hem/onc [facility] has been following pt. on previous admissions, consulted ALEJANDRINA Camp, will see pt. tomorrow.      6/30-Pt. with persisting right flank pain, urology consulted, note appreciated, no urologic interventions indicated at this time.  Pt. is followed by OP urologist Dr. Trinidad [phone].  Pt. was also seen by hem/onc, note appreciated.  Pt. is followed by OP oncologist Dr. Hoyt at [place] [phone].  Discharge planning likely back to home when pain is well controlled and cleared by ID, likely 24-48 hrs.             COVID-19 Negative Lab Result:  · COVID-19 Negative Lab Result	COVID-19 ruled in despite negative lab finding     Problem/Plan - 1:  ·  Problem: Metastasis from bladder cancer.   ·  Plan: CT A/P shows Lung base pulmonary nodules, some of which are cavitary, likely   metastatic disease.  Right hepatic lesion (1.4 cm), suspicious for metastatic disease.  Diffuse intraluminal urinary bladder soft tissue consistent with history of bladder cancer.  -Hem/Onc [facility] consulted, note appreciated  -OP f/u with oncology Dr. Hoyt.     Problem/Plan - 2:  ·  Problem: Severe protein-calorie malnutrition.   ·  Plan: likely due to metastatic disease  -BMI 15.1, serum Albumin 3.0  -Nutrition consult requested.     Problem/Plan - 3:  ·  Problem: UTI (urinary tract infection).   ·  Plan: p/w hematuria and abdominal pain for a week  Has a right nephrostomy tube replaced a week ago 6/20/22  Last admission in [facility], Ucx positive for ceftriaxone resistant Enterobacter cloacae  Urinalysis positive for UTI now on Cefepime   Urine culture NTD  ID Dr. Rodriguez following  Urology consulted, note appreciated, no urologic interventions indicated.     Problem/Plan - 4:  ·  Problem: Pain.   ·  Plan: right flank pain refractory to Tylenol  -Pain mgnt consulted  -Supportive care.     Problem/Plan - 5:  ·  Problem: Chest pain.   ·  Plan: resolved  negative troponin x2  no acute ischemic changes on EKG   if pt c/o chest pain, perform EKG.     Problem/Plan - 6:  ·  Problem: Bladder cancer.   ·  Plan: hx of bladder cancer  last chemo 5/2022   follows at [facility]  Pain management consulted.     Problem/Plan - 7:  ·  Problem: Prophylactic measure.   ·  Plan: pt on SCD for DVt prphylaxis.

## 2022-07-01 NOTE — PROGRESS NOTE ADULT - PROBLEM SELECTOR PLAN 1
CT A/P shows Lung base pulmonary nodules, some of which are cavitary, likely   metastatic disease.  Right hepatic lesion (1.4 cm), suspicious for metastatic disease.  Diffuse intraluminal urinary bladder soft tissue consistent with history of bladder cancer.  -Hem/Onc [facility] consulted, note appreciated  -No chemo to be given during admission  -OP f/u with oncology Dr. Hoyt  ([phone])  -Palliative consulted

## 2022-07-01 NOTE — PROGRESS NOTE ADULT - PROBLEM SELECTOR PLAN 3
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 Rodriguez following  Needs total 7 days Abx per ID  Urology consulted, note appreciated, no urologic interventions indicated

## 2022-07-01 NOTE — PROGRESS NOTE ADULT - ASSESSMENT
Pt is a 55 yo F from home with PMH of Anemia, asthma, bladder CA (not on Chemo from May'22), R Nephrostomy tube, solitary kidney presenting with hematuria and abdominal pain for a week. endorses similar in the past 2/2 uti. patient noting left sided cp x 2 days. In ED trop negative x2, no acute changes on EKG. Had nephrostomy tube recently changed 6/20/22. Presented to ED with c/o right flank pain. Admitted for positive UTI with hematuria which has now resolved. Dr. Rodriguez (ID) following, started on Cefepime 1g daily. Patient continues to c/o Right flank pain, endorses concern for kidney stone (has hx of). IF symptoms do not resolve by am, patient agrees for CT abd to eval r/o kidney stones.     6/29-Persistent right flank pain radiating to left side as well.  CT A/P ordered, showed:  1. Right nephrostomy tube. No hydronephrosis.  2. Lung base pulmonary nodules, some of which are cavitary, likely   metastatic disease.  3. Right hepatic lesion (1.4 cm), suspicious for metastatic disease.  4. Diffuse intraluminal urinary bladder soft tissue consistent with   history of bladder cancer.    Hem/onc [facility] has been following pt. on previous admissions, consulted ALEJANDRINA Camp, will see pt. tomorrow.      6/30-Pt. with persisting right flank pain, urology consulted, note appreciated, no urologic interventions indicated at this time.  Pt. is followed by OP urologist Dr. Trinidad [phone].  Pt. was also seen by hem/onc, note appreciated.  Pt. is followed by OP oncologist Dr. Hoyt at [facility] [phone].  Discharge planning likely back to home when pain is well controlled and cleared by ID, likely 24-48 hrs.  7/1-Pain mgnt consulted, note and reccs appreciated, pt. reports feeling better.  Pt. repeatedly inquires about etiology of her persistent pain, not clear if she understands her illness process.  Pt. agrees to meet with palliative care, consult placed.  As per ID pt. will need total 7 days Abx.  Will likely discharge to home once completed.

## 2022-07-01 NOTE — PROGRESS NOTE ADULT - ASSESSMENT
Patient is a 56y old  Female with PMH of Anemia, asthma, bladder CA (not on Chemo from May'22), Right Nephrostomy tube,  changed 1 week ago. now presents to the ER for evaluation of hematuria and abdominal pain for a week.  On admission, she found to have no fever, BUT positive Urine analysis. She has started on Ceftriaxone and the ID consult requested to assist with further evaluation and antibiotic management.     # Complicated UTI - in the setting of Right nephrostomy tube- s/p changed in 6/20- Urine culture has no growth.   # H/O recent Enterobacter UTI- 6/10/22  #  Bladder CA (not on Chemo from May'22),     would recommend:      1. Management of Bladder cancer as per Hem/Onc  2. Continue Cefepime based on previous C & S, until 7/5/22  3. Monitor kidney function and adjust Abx doses accordingly  4. Pain management as needed  5. OOB to chair     Attending Attestation:    Spent more than 35 minutes on total encounter, more than 50 % of the visit was spent counseling and/or coordinating care by the Attending physician.

## 2022-07-01 NOTE — DIETITIAN INITIAL EVALUATION ADULT - FACTORS AFF FOOD INTAKE
acute to chronic illnesses UTI Hydronephrosis Parathyroid tumor, metastatic Blader Cancer, Anemia, Severe PCM./Zoroastrianism/ethnic/cultural/personal food preferences/other (specify)

## 2022-07-01 NOTE — PROGRESS NOTE ADULT - SUBJECTIVE AND OBJECTIVE BOX
Patient is seen and examined at the bed side, is afebrile. She is doing better.       REVIEW OF SYSTEMS All other review systems are negative      ALLERGIES: No Known Allergies      Vital Signs Last 24 Hrs  T(C): 36.7 (2022 14:29), Max: 36.7 (2022 14:29)  T(F): 98.1 (2022 14:29), Max: 98.1 (2022 14:29)  HR: 78 (:) (59 - 78)  BP: 101/52 (2022 14:29) (93/56 - 109/48)  BP(mean): --  RR: 17 (:) (17 - 17)  SpO2: 100% (:) (100% - 100%)      PHYSICAL EXAM:  GENERAL: Not in distress   CHEST/LUNG:  Not using accessory muscles   HEART: s1 and s2 present  ABDOMEN:  Nontender and  Nondistended  : Right nephrostomy tube in placed   EXTREMITIES: No pedal  edema  CNS: Awake and Alert      LABS:                          11.2   5.22  )-----------( 252      ( 2022 07:23 )             34.6                           10.9   5.62  )-----------( 268      ( 2022 08:13 )             34.4         07-01    137  |  104  |  22<H>  ----------------------------<  95  4.0   |  29  |  1.05    Ca    10.4      2022 07:23        06-30    137  |  102  |  18  ----------------------------<  123<H>  3.9   |  27  |  1.01    Ca    9.9      2022 08:13    TPro  7.6  /  Alb  3.0<L>  /  TBili  0.3  /  DBili  x   /  AST  13  /  ALT  16  /  AlkPhos  95  06-28    PT/INR - ( 2022 14:43 )   PT: 13.1 sec;   INR: 1.10 ratio      PTT - ( 2022 14:43 )  PTT:28.9 sec      CAPILLARY BLOOD GLUCOSE  POCT Blood Glucose.: 64 mg/dL (2022 12:47)  POCT Blood Glucose.: 88 mg/dL (2022 19:25)  POCT Blood Glucose.: 99 mg/dL (2022 14:28)        Urinalysis Basic - ( 2022 16:34 )  Color: Yellow / Appearance: Slightly Turbid / S.015 / pH: x  Gluc: x / Ketone: Small  / Bili: Negative / Urobili: Negative   Blood: x / Protein: 500 mg/dL / Nitrite: Negative   Leuk Esterase: Small / RBC: 10-25 /HPF / WBC 6-10 /HPF   Sq Epi: x / Non Sq Epi: Moderate /HPF / Bacteria: Moderate /HPF        MEDICATIONS  (STANDING):    acetaminophen     Tablet .. 325 milliGRAM(s) Oral every 4 hours  atorvastatin 40 milliGRAM(s) Oral at bedtime  budesonide  80 MICROgram(s)/formoterol 4.5 MICROgram(s) Inhaler 2 Puff(s) Inhalation two times a day  cefepime   IVPB      cefepime   IVPB 1000 milliGRAM(s) IV Intermittent every 8 hours  cholecalciferol 1000 Unit(s) Oral daily  dextrose 5%. 1000 milliLiter(s) (50 mL/Hr) IV Continuous <Continuous>  dextrose 5%. 1000 milliLiter(s) (100 mL/Hr) IV Continuous <Continuous>  dextrose 50% Injectable 25 Gram(s) IV Push once  dextrose 50% Injectable 12.5 Gram(s) IV Push once  dextrose 50% Injectable 25 Gram(s) IV Push once  glucagon  Injectable 1 milliGRAM(s) IntraMuscular once  insulin lispro (ADMELOG) corrective regimen sliding scale   SubCutaneous three times a day before meals  insulin lispro (ADMELOG) corrective regimen sliding scale   SubCutaneous at bedtime  lidocaine   4% Patch 1 Patch Transdermal daily  OLANZapine 5 milliGRAM(s) Oral daily  polyethylene glycol 3350 17 Gram(s) Oral daily  senna 2 Tablet(s) Oral at bedtime  sodium chloride 0.9%. 1000 milliLiter(s) (60 mL/Hr) IV Continuous <Continuous>        RADIOLOGY & ADDITIONAL TESTS:    22: CT Abdomen and Pelvis No Cont (22 @ 10:10) >  1. Right nephrostomy tube. No hydronephrosis.  2. Lung base pulmonary nodules, some of which are cavitary, likely  metastatic disease.  3. Right hepatic lesion (1.4 cm), suspicious for metastatic disease.  4. Diffuse intraluminal urinary bladder soft tissue consistent with  history of bladder cancer.      22 : Xray Chest 2 Views PA/Lat (22 @ 18:35):  Multiple bilateral pulmonary nodules again seen, for the   most part unchanged, although a 2.4 cm cavitary left lower lobe nodule is  larger, previously 2 cm.        MICROBIOLOGY DATA:    Culture - Urine (22 @ 21:07)   Specimen Source: Clean Catch Clean Catch (Midstream)   Culture Results:   <10,000 CFU/mL Normal Urogenital Vandana     COVID-19 PCR . (22 @ 18:11)   COVID-19 PCR: NotDetec

## 2022-07-01 NOTE — DIETITIAN NUTRITION RISK NOTIFICATION - TREATMENT: THE FOLLOWING DIET HAS BEEN RECOMMENDED
Diet, Regular (06-30-22 @ 11:41) [Active]  Double portion with meals provided as per pt request, honor food preferences; Refuses supplements.

## 2022-07-01 NOTE — PROGRESS NOTE ADULT - SUBJECTIVE AND OBJECTIVE BOX
NP Note discussed with  Primary Attending    Patient is a 56y old  Female who presents with a chief complaint of UTI and chest pain (01 Jul 2022 11:54)      INTERVAL HPI/OVERNIGHT EVENTS: no new complaints    MEDICATIONS  (STANDING):  acetaminophen     Tablet .. 325 milliGRAM(s) Oral every 4 hours  atorvastatin 40 milliGRAM(s) Oral at bedtime  budesonide  80 MICROgram(s)/formoterol 4.5 MICROgram(s) Inhaler 2 Puff(s) Inhalation two times a day  cefepime   IVPB      cefepime   IVPB 1000 milliGRAM(s) IV Intermittent every 8 hours  cholecalciferol 1000 Unit(s) Oral daily  dextrose 5%. 1000 milliLiter(s) (50 mL/Hr) IV Continuous <Continuous>  dextrose 5%. 1000 milliLiter(s) (100 mL/Hr) IV Continuous <Continuous>  dextrose 50% Injectable 25 Gram(s) IV Push once  dextrose 50% Injectable 12.5 Gram(s) IV Push once  dextrose 50% Injectable 25 Gram(s) IV Push once  glucagon  Injectable 1 milliGRAM(s) IntraMuscular once  insulin lispro (ADMELOG) corrective regimen sliding scale   SubCutaneous three times a day before meals  insulin lispro (ADMELOG) corrective regimen sliding scale   SubCutaneous at bedtime  lidocaine   4% Patch 1 Patch Transdermal daily  OLANZapine 5 milliGRAM(s) Oral daily  polyethylene glycol 3350 17 Gram(s) Oral daily  senna 2 Tablet(s) Oral at bedtime  sodium chloride 0.9%. 1000 milliLiter(s) (60 mL/Hr) IV Continuous <Continuous>    MEDICATIONS  (PRN):  aluminum hydroxide/magnesium hydroxide/simethicone Suspension 30 milliLiter(s) Oral every 6 hours PRN Dyspepsia  dextrose Oral Gel 15 Gram(s) Oral once PRN Blood Glucose LESS THAN 70 milliGRAM(s)/deciliter  morphine  IR 7.5 milliGRAM(s) Oral every 6 hours PRN Severe Pain (7 - 10)  ondansetron    Tablet 4 milliGRAM(s) Oral two times a day PRN Nausea      __________________________________________________  REVIEW OF SYSTEMS:  right flank pain    CONSTITUTIONAL: No fever,   EYES: no acute visual disturbances  NECK: No pain or stiffness  RESPIRATORY: No cough; No shortness of breath  CARDIOVASCULAR: No chest pain, no palpitations  GASTROINTESTINAL: No pain. No nausea or vomiting; No diarrhea   NEUROLOGICAL: No headache or numbness, no tremors  MUSCULOSKELETAL: No joint pain, no muscle pain  GENITOURINARY: no dysuria, no frequency, no hesitancy  PSYCHIATRY: no depression , no anxiety  ALL OTHER  ROS negative        Vital Signs Last 24 Hrs  T(C): 36.4 (01 Jul 2022 06:04), Max: 37.3 (30 Jun 2022 13:35)  T(F): 97.6 (01 Jul 2022 06:04), Max: 99.1 (30 Jun 2022 13:35)  HR: 67 (01 Jul 2022 06:04) (59 - 67)  BP: 109/48 (01 Jul 2022 06:04) (93/47 - 109/48)  BP(mean): --  RR: 17 (01 Jul 2022 06:04) (17 - 17)  SpO2: 100% (01 Jul 2022 06:04) (98% - 100%)    ________________________________________________  PHYSICAL EXAM:  severely cachectic,    GENERAL: NAD  HEENT: Normocephalic;  conjunctivae and sclerae clear; moist mucous membranes;   NECK : supple  CHEST/LUNG: Clear to auscultation bilaterally with good air entry   HEART: S1 S2  regular; no murmurs, gallops or rubs  ABDOMEN: Soft, Nontender, Nondistended; Bowel sounds present  EXTREMITIES: no cyanosis; no edema; no calf tenderness  SKIN: warm and dry; no rash  NERVOUS SYSTEM:  Awake and alert; Oriented  to place, person and time ; no new deficits  URO:  Right nephrostomy tube draining clear urine  _________________________________________________  LABS:                        11.2   5.22  )-----------( 252      ( 01 Jul 2022 07:23 )             34.6     07-01    137  |  104  |  22<H>  ----------------------------<  95  4.0   |  29  |  1.05    Ca    10.4      01 Jul 2022 07:23    CAPILLARY BLOOD GLUCOSE      POCT Blood Glucose.: 60 mg/dL (01 Jul 2022 11:45)  POCT Blood Glucose.: 86 mg/dL (01 Jul 2022 07:28)  POCT Blood Glucose.: 106 mg/dL (30 Jun 2022 21:43)  POCT Blood Glucose.: 102 mg/dL (30 Jun 2022 16:33)  POCT Blood Glucose.: 131 mg/dL (30 Jun 2022 13:53)    RADIOLOGY & ADDITIONAL TESTS:  < from: CT Abdomen and Pelvis No Cont (06.29.22 @ 10:10) >    ACC: [id number] EXAM:  CT ABDOMEN AND PELVIS                          PROCEDURE DATE:  06/29/2022          INTERPRETATION:  CLINICAL INFORMATION: Right flank pain    COMPARISON: 6/9/2022    CONTRAST/COMPLICATIONS:  IV Contrast: NONE  Oral Contrast: NONE  Complications: None reported at time of study completion    PROCEDURE:  CT of the Abdomen and Pelvis was performed.  Sagittal and coronal reformats were performed.    FINDINGS:  LOWER CHEST: Redemonstration of basilar pulmonary nodules, some of which   are cavitary. Partially imaged cardiac lead.    Please note that evaluation of the abdominal organs and vascular   structures is limited by lack of intravenous contrast.    LIVER: 1.4 cm right hepatic lesion.  BILE DUCTS: Normal caliber.  GALLBLADDER: Within normal limits.  SPLEEN: Within normal limits.  PANCREAS: Within normal limits.  ADRENALS: Within normal limits.  KIDNEYS/URETERS: Right nephrostomy tube. No right hydronephrosis.   Nonobstructive right intrarenal calculus. Chronic severe left   hydronephrosis with cortical thinning and moderate left hydroureter.    BLADDER: Diffuse intraluminal soft tissue.  REPRODUCTIVE ORGANS: Prominent uterus.    BOWEL: No bowel obstruction. Appendix is within normal limits.  PERITONEUM: Mild pelvic fluid.  VESSELS: Within normal limits.  RETROPERITONEUM/LYMPH NODES: Retroperitoneal and pelvic adenopathy.  ABDOMINAL WALL: Postsurgical changes. Mild subcutaneous soft tissue edema.  BONES: Within normal limits.    IMPRESSION:    1. Right nephrostomy tube. No hydronephrosis.  2. Lung base pulmonary nodules, some of which are cavitary, likely   metastatic disease.  3. Right hepatic lesion (1.4 cm), suspicious for metastatic disease.  4. Diffuse intraluminal urinary bladder soft tissue consistent with   history of bladder cancer.    < end of copied text >    < from: Xray Chest 2 Views PA/Lat (06.27.22 @ 18:35) >    ACC: [id number] EXAM:  XR CHEST PA LAT 2V                          PROCEDURE DATE:  06/27/2022          INTERPRETATION:  TIME OF EXAM: June 27, 2022 at 6:33 PM.    CLINICAL INFORMATION: Chest pain.    TECHNIQUE:   PA and lateral chest x-ray.    COMPARISON: June 9, 2022.    INTERPRETATION: The heart is not enlarged. A left chest wall cardiac   pacemaker with intact leads with tips in the region of the right atrium   and right ventricle is again seen. The generator obscures part of the   left lung on the PA image.  The mediastinum and john are unremarkable.  Multiple bilateral pulmonary nodules are again seen, for the most part   unchanged, although a 2.4 cm cavitary left lower lobe nodule is larger,   previously 2 cm.  No pleural effusion or pneumothorax is seen.  There is scoliosis of the spine.    IMPRESSION:  Multiple bilateral pulmonary nodules again seen, for the   most part unchanged, although a 2.4 cm cavitary left lower lobe nodule is   larger, previously 2 cm.    --- End of Report---    < end of copied text >      Imaging Personally Reviewed:  YES/NO    Consultant(s) Notes Reviewed:   YES/ No    Care Discussed with Consultants :     Plan of care was discussed with patient and /or primary care giver; all questions and concerns were addressed and care was aligned with patient's wishes.

## 2022-07-01 NOTE — DIETITIAN INITIAL EVALUATION ADULT - ADD RECOMMEND
Severe malnutrition identified. Pt refused nutritional supplements, Double portion with meals provided as per pt request, honor food preferences.

## 2022-07-01 NOTE — DIETITIAN INITIAL EVALUATION ADULT - PERTINENT MEDS FT
MEDICATIONS  (STANDING):  acetaminophen     Tablet .. 325 milliGRAM(s) Oral every 4 hours  atorvastatin 40 milliGRAM(s) Oral at bedtime  budesonide  80 MICROgram(s)/formoterol 4.5 MICROgram(s) Inhaler 2 Puff(s) Inhalation two times a day  cefepime   IVPB      cefepime   IVPB 1000 milliGRAM(s) IV Intermittent every 8 hours  cholecalciferol 1000 Unit(s) Oral daily  dextrose 5%. 1000 milliLiter(s) (50 mL/Hr) IV Continuous <Continuous>  dextrose 5%. 1000 milliLiter(s) (100 mL/Hr) IV Continuous <Continuous>  dextrose 50% Injectable 25 Gram(s) IV Push once  dextrose 50% Injectable 12.5 Gram(s) IV Push once  dextrose 50% Injectable 25 Gram(s) IV Push once  glucagon  Injectable 1 milliGRAM(s) IntraMuscular once  insulin lispro (ADMELOG) corrective regimen sliding scale   SubCutaneous three times a day before meals  insulin lispro (ADMELOG) corrective regimen sliding scale   SubCutaneous at bedtime  lidocaine   4% Patch 1 Patch Transdermal daily  OLANZapine 5 milliGRAM(s) Oral daily  polyethylene glycol 3350 17 Gram(s) Oral daily  senna 2 Tablet(s) Oral at bedtime  sodium chloride 0.9%. 1000 milliLiter(s) (60 mL/Hr) IV Continuous <Continuous>    MEDICATIONS  (PRN):  aluminum hydroxide/magnesium hydroxide/simethicone Suspension 30 milliLiter(s) Oral every 6 hours PRN Dyspepsia  dextrose Oral Gel 15 Gram(s) Oral once PRN Blood Glucose LESS THAN 70 milliGRAM(s)/deciliter  morphine  IR 7.5 milliGRAM(s) Oral every 6 hours PRN Severe Pain (7 - 10)  ondansetron    Tablet 4 milliGRAM(s) Oral two times a day PRN Nausea

## 2022-07-01 NOTE — DIETITIAN INITIAL EVALUATION ADULT - ETIOLOGY
r/t Acute on chronic comorbidities including cancer with chemotherapy; cultural/ethnical/individual food preferences.

## 2022-07-01 NOTE — DIETITIAN INITIAL EVALUATION ADULT - SIGNS/SYMPTOMS
AEB severe muscle and fat loss and BMI 16.7 underweight.  AEB severe muscle and fat loss 10 lbs wt loss x 2 months and BMI 16.7 underweight.

## 2022-07-02 NOTE — PROGRESS NOTE ADULT - ASSESSMENT
Patient is a 56y old  Female with PMH of Anemia, asthma, bladder CA (not on Chemo from May'22), Right Nephrostomy tube,  changed 1 week ago. now presents to the ER for evaluation of hematuria and abdominal pain for a week.  On admission, she found to have no fever, BUT positive Urine analysis. She has started on Ceftriaxone and the ID consult requested to assist with further evaluation and antibiotic management.     # Complicated UTI - in the setting of Right nephrostomy tube- s/p changed in 6/20- Urine culture has no growth.   # H/O recent Enterobacter UTI- 6/10/22  #  Bladder CA (not on Chemo from May'22),     would recommend:    1. OOB to chair   2. Management of Bladder cancer as per Hem/Onc  3. Continue Cefepime based on previous C & S, until 7/5/22, may change to oral Levaquin on discharge if QTC is less than 500  4. Pain management as needed      Attending Attestation:    Spent more than 35 minutes on total encounter, more than 50 % of the visit was spent counseling and/or coordinating care by the Attending physician.

## 2022-07-02 NOTE — PROGRESS NOTE ADULT - SUBJECTIVE AND OBJECTIVE BOX
Patient is seen and examined at the bed side, is afebrile. The WBC count and kidney function stay normal.       REVIEW OF SYSTEMS All other review systems are negative      ALLERGIES: No Known Allergies      Vital Signs Last 24 Hrs  T(C): 36.4 (2022 12:55), Max: 36.8 (2022 05:11)  T(F): 97.5 (2022 12:55), Max: 98.3 (2022 05:11)  HR: 71 (2022 12:55) (64 - 71)  BP: 110/56 (2022 12:55) (93/46 - 110/56)  BP(mean): 54 (2022 05:11) (54 - 57)  RR: 18 (2022 12:55) (18 - 18)  SpO2: 100% (2022 12:55) (100% - 100%)        PHYSICAL EXAM:  GENERAL: Not in distress   CHEST/LUNG:  Not using accessory muscles   HEART: s1 and s2 present  ABDOMEN:  Nontender and  Nondistended  : Right nephrostomy tube in placed   EXTREMITIES: No pedal  edema  CNS: Awake and Alert      LABS:                        10.6   5.69  )-----------( 246      ( 2022 06:58 )             32.2                           11.2   5.22  )-----------( 252      ( 2022 07:23 )             34.6         07-02    138  |  105  |  20<H>  ----------------------------<  96  4.3   |  27  |  0.90    Ca    10.1      2022 06:58      07-01    137  |  104  |  22<H>  ----------------------------<  95  4.0   |  29  |  1.05    Ca    10.4      2022 07:23      TPro  7.6  /  Alb  3.0<L>  /  TBili  0.3  /  DBili  x   /  AST  13  /  ALT  16  /  AlkPhos  95  06-28    PT/INR - ( 2022 14:43 )   PT: 13.1 sec;   INR: 1.10 ratio      PTT - ( 2022 14:43 )  PTT:28.9 sec      CAPILLARY BLOOD GLUCOSE  POCT Blood Glucose.: 64 mg/dL (2022 12:47)  POCT Blood Glucose.: 88 mg/dL (2022 19:25)  POCT Blood Glucose.: 99 mg/dL (2022 14:28)        Urinalysis Basic - ( 2022 16:34 )  Color: Yellow / Appearance: Slightly Turbid / S.015 / pH: x  Gluc: x / Ketone: Small  / Bili: Negative / Urobili: Negative   Blood: x / Protein: 500 mg/dL / Nitrite: Negative   Leuk Esterase: Small / RBC: 10-25 /HPF / WBC 6-10 /HPF   Sq Epi: x / Non Sq Epi: Moderate /HPF / Bacteria: Moderate /HPF        MEDICATIONS  (STANDING):    acetaminophen     Tablet .. 325 milliGRAM(s) Oral every 4 hours  atorvastatin 40 milliGRAM(s) Oral at bedtime  budesonide  80 MICROgram(s)/formoterol 4.5 MICROgram(s) Inhaler 2 Puff(s) Inhalation two times a day  cefepime   IVPB      cefepime   IVPB 1000 milliGRAM(s) IV Intermittent every 8 hours  cholecalciferol 1000 Unit(s) Oral daily  glucagon  Injectable 1 milliGRAM(s) IntraMuscular once  insulin lispro (ADMELOG) corrective regimen sliding scale   SubCutaneous three times a day before meals  insulin lispro (ADMELOG) corrective regimen sliding scale   SubCutaneous at bedtime  lidocaine   4% Patch 1 Patch Transdermal daily  OLANZapine 5 milliGRAM(s) Oral daily  polyethylene glycol 3350 17 Gram(s) Oral daily  senna 2 Tablet(s) Oral at bedtime  sodium chloride 0.9%. 1000 milliLiter(s) (60 mL/Hr) IV Continuous <Continuous>        RADIOLOGY & ADDITIONAL TESTS:    22: CT Abdomen and Pelvis No Cont (22 @ 10:10) >  1. Right nephrostomy tube. No hydronephrosis.  2. Lung base pulmonary nodules, some of which are cavitary, likely  metastatic disease.  3. Right hepatic lesion (1.4 cm), suspicious for metastatic disease.  4. Diffuse intraluminal urinary bladder soft tissue consistent with  history of bladder cancer.      22 : Xray Chest 2 Views PA/Lat (22 @ 18:35):  Multiple bilateral pulmonary nodules again seen, for the   most part unchanged, although a 2.4 cm cavitary left lower lobe nodule is  larger, previously 2 cm.        MICROBIOLOGY DATA:    Culture - Urine (22 @ 21:07)   Specimen Source: Clean Catch Clean Catch (Midstream)   Culture Results: <10,000 CFU/mL Normal Urogenital Vandana     COVID-19 PCR . (22 @ 18:11)   COVID-19 PCR: NotDetec

## 2022-07-02 NOTE — PROGRESS NOTE ADULT - SUBJECTIVE AND OBJECTIVE BOX
INTERVAL HPI/OVERNIGHT EVENTS:  Patient seen,events noticed for overnight  VITAL SIGNS:  T(F): 98.3 (07-02-22 @ 05:11)  HR: 64 (07-02-22 @ 05:11)  BP: 99/46 (07-02-22 @ 05:11)  RR: 18 (07-02-22 @ 05:11)  SpO2: 100% (07-02-22 @ 05:11)  Wt(kg): --    PHYSICAL EXAM:  awake  Constitutional:  Eyes:  ENMT:perrla  Neck:  Respiratory:clear  Cardiovascular:s1s2,m-none  Gastrointestinal:soft,bs pos  Extremities:  Vascular:  Neurological:no focal deficit  Musculoskeletal:    MEDICATIONS  (STANDING):  acetaminophen     Tablet .. 325 milliGRAM(s) Oral every 4 hours  atorvastatin 40 milliGRAM(s) Oral at bedtime  budesonide  80 MICROgram(s)/formoterol 4.5 MICROgram(s) Inhaler 2 Puff(s) Inhalation two times a day  cefepime   IVPB      cefepime   IVPB 1000 milliGRAM(s) IV Intermittent every 8 hours  cholecalciferol 1000 Unit(s) Oral daily  dextrose 5%. 1000 milliLiter(s) (50 mL/Hr) IV Continuous <Continuous>  dextrose 5%. 1000 milliLiter(s) (100 mL/Hr) IV Continuous <Continuous>  dextrose 50% Injectable 25 Gram(s) IV Push once  dextrose 50% Injectable 12.5 Gram(s) IV Push once  dextrose 50% Injectable 25 Gram(s) IV Push once  glucagon  Injectable 1 milliGRAM(s) IntraMuscular once  insulin lispro (ADMELOG) corrective regimen sliding scale   SubCutaneous three times a day before meals  insulin lispro (ADMELOG) corrective regimen sliding scale   SubCutaneous at bedtime  lidocaine   4% Patch 1 Patch Transdermal daily  OLANZapine 5 milliGRAM(s) Oral daily  polyethylene glycol 3350 17 Gram(s) Oral daily  senna 2 Tablet(s) Oral at bedtime  sodium chloride 0.9%. 1000 milliLiter(s) (60 mL/Hr) IV Continuous <Continuous>    MEDICATIONS  (PRN):  aluminum hydroxide/magnesium hydroxide/simethicone Suspension 30 milliLiter(s) Oral every 6 hours PRN Dyspepsia  dextrose Oral Gel 15 Gram(s) Oral once PRN Blood Glucose LESS THAN 70 milliGRAM(s)/deciliter  morphine  IR 7.5 milliGRAM(s) Oral every 6 hours PRN Severe Pain (7 - 10)  ondansetron    Tablet 4 milliGRAM(s) Oral two times a day PRN Nausea      Allergies    No Known Allergies    Intolerances        LABS:                        10.6   5.69  )-----------( 246      ( 02 Jul 2022 06:58 )             32.2     07-02    138  |  105  |  20<H>  ----------------------------<  96  4.3   |  27  |  0.90    Ca    10.1      02 Jul 2022 06:58            RADIOLOGY & ADDITIONAL TESTS:      Assessment and Plan:   · Assessment	  Pt is a 55 yo F from home with PMH of Anemia, asthma, bladder CA (not on Chemo from May'22), R Nephrostomy tube, solitary kidney presenting with hematuria and abdominal pain for a week. endorses similar in the past 2/2 uti. patient noting left sided cp x 2 days. In ED trop negative x2, no acute changes on EKG. Had nephrostomy tube recently changed 6/20/22. Presented to ED with c/o right flank pain. Admitted for positive UTI with hematuria which has now resolved. Dr. Rodriguez (ID) following, started on Cefepime 1g daily. Patient continues to c/o Right flank pain, endorses concern for kidney stone (has hx of). IF symptoms do not resolve by am, patient agrees for CT abd to eval r/o kidney stones.     6/29-Persistent right flank pain radiating to left side as well.  CT A/P ordered, showed:  1. Right nephrostomy tube. No hydronephrosis.  2. Lung base pulmonary nodules, some of which are cavitary, likely   metastatic disease.  3. Right hepatic lesion (1.4 cm), suspicious for metastatic disease.  4. Diffuse intraluminal urinary bladder soft tissue consistent with   history of bladder cancer.    Hem/onc [facility] has been following pt. on previous admissions, consulted ALEJANDRINA Camp, will see pt. tomorrow.      6/30-Pt. with persisting right flank pain, urology consulted, note appreciated, no urologic interventions indicated at this time.  Pt. is followed by OP urologist Dr. Trinidad [phone].  Pt. was also seen by hem/onc, note appreciated.  Pt. is followed by OP oncologist Dr. Hoyt at [place] [phone].  Discharge planning likely back to home when pain is well controlled and cleared by ID, likely 24-48 hrs.             COVID-19 Negative Lab Result:  · COVID-19 Negative Lab Result	COVID-19 ruled in despite negative lab finding     Problem/Plan - 1:  ·  Problem: Metastasis from bladder cancer.   ·  Plan: CT A/P shows Lung base pulmonary nodules, some of which are cavitary, likely   metastatic disease.  Right hepatic lesion (1.4 cm), suspicious for metastatic disease.  Diffuse intraluminal urinary bladder soft tissue consistent with history of bladder cancer.  -Hem/Onc [facility] consulted, note appreciated  -OP f/u with oncology Dr. Hoyt.  -palliative care f/up for GOC     Problem/Plan - 2:  ·  Problem: Severe protein-calorie malnutrition.   ·  Plan: likely due to metastatic disease  -BMI 15.1, serum Albumin 3.0  -Nutrition consult requested.     Problem/Plan - 3:  ·  Problem: UTI (urinary tract infection).   ·  Plan: p/w hematuria and abdominal pain for a week  Has a right nephrostomy tube replaced a week ago 6/20/22  Last admission in [facility], Ucx positive for ceftriaxone resistant Enterobacter cloacae  Urinalysis positive for UTI now on Cefepime   cont abx till 7/05  ID Dr. Rodriguez following  Urology consulted, note appreciated, no urologic interventions indicated.     Problem/Plan - 4:  ·  Problem: Pain.   ·  Plan: right flank pain refractory to Tylenol  -Pain mgnt consulted  -Supportive care.     Problem/Plan - 5:  ·  Problem: Chest pain.   ·  Plan: resolved  negative troponin x2  no acute ischemic changes on EKG   if pt c/o chest pain, perform EKG.     Problem/Plan - 6:  ·  Problem: Bladder cancer.   ·  Plan: hx of bladder cancer  last chemo 5/2022   follows at [facility]  Pain management consulted.     Problem/Plan - 7:  ·  Problem: Prophylactic measure.   ·  Plan: pt on SCD for DVt prphylaxis.

## 2022-07-03 NOTE — PROGRESS NOTE ADULT - SUBJECTIVE AND OBJECTIVE BOX
INTERVAL HPI/OVERNIGHT EVENTS:  Patient seen,no events,getting abx  VITAL SIGNS:  T(F): 99 (07-03-22 @ 05:14)  HR: 60 (07-03-22 @ 05:14)  BP: 115/71 (07-03-22 @ 05:14)  RR: 18 (07-03-22 @ 05:14)  SpO2: 97% (07-03-22 @ 05:14)  Wt(kg): --    PHYSICAL EXAM:  awake  Constitutional:  Eyes:  ENMT:perrla  Neck:  Respiratory:clear  Cardiovascular:s1s2,m-none  Gastrointestinal:soft,bs pos  Extremities:  Vascular:  Neurological:no focal deficit  Musculoskeletal:    MEDICATIONS  (STANDING):  acetaminophen     Tablet .. 325 milliGRAM(s) Oral every 4 hours  atorvastatin 40 milliGRAM(s) Oral at bedtime  budesonide  80 MICROgram(s)/formoterol 4.5 MICROgram(s) Inhaler 2 Puff(s) Inhalation two times a day  cefepime   IVPB      cefepime   IVPB 1000 milliGRAM(s) IV Intermittent every 8 hours  cholecalciferol 1000 Unit(s) Oral daily  dextrose 5%. 1000 milliLiter(s) (100 mL/Hr) IV Continuous <Continuous>  dextrose 5%. 1000 milliLiter(s) (50 mL/Hr) IV Continuous <Continuous>  dextrose 50% Injectable 25 Gram(s) IV Push once  dextrose 50% Injectable 12.5 Gram(s) IV Push once  dextrose 50% Injectable 25 Gram(s) IV Push once  glucagon  Injectable 1 milliGRAM(s) IntraMuscular once  insulin lispro (ADMELOG) corrective regimen sliding scale   SubCutaneous three times a day before meals  insulin lispro (ADMELOG) corrective regimen sliding scale   SubCutaneous at bedtime  lidocaine   4% Patch 1 Patch Transdermal daily  OLANZapine 5 milliGRAM(s) Oral daily  polyethylene glycol 3350 17 Gram(s) Oral daily  senna 2 Tablet(s) Oral at bedtime  sodium chloride 0.9%. 1000 milliLiter(s) (60 mL/Hr) IV Continuous <Continuous>    MEDICATIONS  (PRN):  aluminum hydroxide/magnesium hydroxide/simethicone Suspension 30 milliLiter(s) Oral every 6 hours PRN Dyspepsia  dextrose Oral Gel 15 Gram(s) Oral once PRN Blood Glucose LESS THAN 70 milliGRAM(s)/deciliter  morphine  IR 7.5 milliGRAM(s) Oral every 6 hours PRN Severe Pain (7 - 10)  ondansetron    Tablet 4 milliGRAM(s) Oral two times a day PRN Nausea      Allergies    No Known Allergies    Intolerances        LABS:                        9.8    5.45  )-----------( 232      ( 03 Jul 2022 08:23 )             30.0     07-03    138  |  105  |  19<H>  ----------------------------<  84  4.1   |  24  |  0.83    Ca    9.9      03 Jul 2022 08:23            RADIOLOGY & ADDITIONAL TESTS:      Assessment and Plan:   · Assessment	  Pt is a 55 yo F from home with PMH of Anemia, asthma, bladder CA (not on Chemo from May'22), R Nephrostomy tube, solitary kidney presenting with hematuria and abdominal pain for a week. endorses similar in the past 2/2 uti. patient noting left sided cp x 2 days. In ED trop negative x2, no acute changes on EKG. Had nephrostomy tube recently changed 6/20/22. Presented to ED with c/o right flank pain. Admitted for positive UTI with hematuria which has now resolved. Dr. Rodriguez (ID) following, started on Cefepime 1g daily. Patient continues to c/o Right flank pain, endorses concern for kidney stone (has hx of). IF symptoms do not resolve by am, patient agrees for CT abd to eval r/o kidney stones.     6/29-Persistent right flank pain radiating to left side as well.  CT A/P ordered, showed:  1. Right nephrostomy tube. No hydronephrosis.  2. Lung base pulmonary nodules, some of which are cavitary, likely   metastatic disease.  3. Right hepatic lesion (1.4 cm), suspicious for metastatic disease.  4. Diffuse intraluminal urinary bladder soft tissue consistent with   history of bladder cancer.    Hem/onc [facility] has been following pt. on previous admissions, consulted ALEJANDRINA Camp, will see pt. tomorrow.      6/30-Pt. with persisting right flank pain, urology consulted, note appreciated, no urologic interventions indicated at this time.  Pt. is followed by OP urologist Dr. Trinidad [phone].  Pt. was also seen by hem/onc, note appreciated.  Pt. is followed by OP oncologist Dr. Hoyt at [place] [phone].  Discharge planning likely back to home when pain is well controlled and cleared by ID, likely 24-48 hrs.             COVID-19 Negative Lab Result:  · COVID-19 Negative Lab Result	COVID-19 ruled in despite negative lab finding     Problem/Plan - 1:  ·  Problem: Metastasis from bladder cancer.   ·  Plan: CT A/P shows Lung base pulmonary nodules, some of which are cavitary, likely   metastatic disease.  Right hepatic lesion (1.4 cm), suspicious for metastatic disease.  Diffuse intraluminal urinary bladder soft tissue consistent with history of bladder cancer.  -Hem/Onc [facility] consulted, note appreciated  -OP f/u with oncology Dr. Hoyt.  -palliative care f/up for GOC     Problem/Plan - 2:  ·  Problem: Severe protein-calorie malnutrition.   ·  Plan: likely due to metastatic disease  -BMI 15.1, serum Albumin 3.0  -Nutrition consult requested.     Problem/Plan - 3:  ·  Problem: UTI (urinary tract infection).   ·  Plan: p/w hematuria and abdominal pain for a week  Has a right nephrostomy tube replaced a week ago 6/20/22  Last admission in [place] [facility], Ucx positive for ceftriaxone resistant Enterobacter cloacae  Urinalysis positive for UTI now on Cefepime   cont abx till 7/05  ID Dr. Rodriguez following  Urology consulted, note appreciated, no urologic interventions indicated.     Problem/Plan - 4:  ·  Problem: Pain.   ·  Plan: right flank pain refractory to Tylenol  -Pain mgnt consulted  -Supportive care.     Problem/Plan - 5:  ·  Problem: Chest pain.   ·  Plan: resolved  negative troponin x2  no acute ischemic changes on EKG   if pt c/o chest pain, perform EKG.     Problem/Plan - 6:  ·  Problem: Bladder cancer.   ·  Plan: hx of bladder cancer  last chemo 5/2022   follows at [facility]  Pain management consulted.     Problem/Plan - 7:  ·  Problem: Prophylactic measure.   ·  Plan: pt on SCD for DVt prphylaxis.

## 2022-07-04 NOTE — CONSULT NOTE ADULT - SUBJECTIVE AND OBJECTIVE BOX
HPI: 57yo F admitted to medicine for recurrent UTI (last admission in [place] [facility], Ucx positive for ceftriaxone resistant Enterobacter cloacae) and hematuria. Patient reports one episode of vaginal bleeding, with passage of 1-2 small clots. Patient has known history of bladder cancer, last chemo 2022. Patient denies h/o vaginal bleeding prior to admission, denies vaginal discharge; pelvic pain, abd pain.  Last pap smear   pob/gynhx:  ;  x5, last child . denies history of std's; no abnl pap smear; history of fibroids; menopause at age 53  pmhx: Anemia, asthma, bladder CA (not on Chemo from May'22), R Nephrostomy tube, solitary kidney presenting with hematuria, Parathyroid tumor.   pshx: hydronephrosis s/p Right Perc nephrostomy tube placement 2021, exchange 2021, myomectomy    all: nkda  sochx: no etoh/drug/tobacco use; denies h/o anxiety/depression    PE:  Vital Signs Last 24 Hrs  T(C): 37.2 (2022 13:06), Max: 37.2 (2022 13:06)  T(F): 99 (2022 13:06), Max: 99 (2022 13:06)  HR: 66 (2022 13:06) (56 - 66)  BP: 105/63 (2022 13:06) (104/57 - 125/77)  BP(mean): 69 (2022 20:37) (69 - 69)  RR: 18 (2022 13:06) (18 - 18)  SpO2: 100% (2022 13:06) (98% - 100%)    abd: +bs; soft, nt, nd  pelvis: no active vaginal bleeding. One drop stain appreciated on pad. patient refuses pelvic exam.     LABS:                        9.8    5.45  )-----------( 232      ( 2022 08:23 )             30.0     07-03    138  |  105  |  19<H>  ----------------------------<  84  4.1   |  24  |  0.83    Ca    9.9      2022 08:23        RADIOLOGY & ADDITIONAL STUDIES:  < from: CT Abdomen and Pelvis No Cont (22 @ 10:10) >  ACC: [id number] EXAM:  CT ABDOMEN AND PELVIS                          PROCEDURE DATE:  2022          INTERPRETATION:  CLINICAL INFORMATION: Right flank pain    COMPARISON: 2022    CONTRAST/COMPLICATIONS:  IV Contrast: NONE  Oral Contrast: NONE  Complications: None reported at time of study completion    PROCEDURE:  CT of the Abdomen and Pelvis was performed.  Sagittal and coronal reformats were performed.    FINDINGS:  LOWER CHEST: Redemonstration of basilar pulmonary nodules, some of which   are cavitary. Partially imaged cardiac lead.    Please note that evaluation of the abdominal organs and vascular   structures is limited by lack of intravenous contrast.    LIVER: 1.4 cm right hepatic lesion.  BILE DUCTS: Normal caliber.  GALLBLADDER: Within normal limits.  SPLEEN: Within normal limits.  PANCREAS: Within normal limits.  ADRENALS: Within normal limits.  KIDNEYS/URETERS: Right nephrostomy tube. No right hydronephrosis.   Nonobstructive right intrarenal calculus. Chronic severe left   hydronephrosis with cortical thinning and moderate left hydroureter.    BLADDER: Diffuse intraluminal soft tissue.  REPRODUCTIVE ORGANS: Prominent uterus.    BOWEL: No bowel obstruction. Appendix is within normal limits.  PERITONEUM: Mild pelvic fluid.  VESSELS: Within normal limits.  RETROPERITONEUM/LYMPH NODES: Retroperitoneal and pelvic adenopathy.  ABDOMINAL WALL: Postsurgical changes. Mild subcutaneous soft tissue edema.  BONES: Within normal limits.    IMPRESSION:    1. Right nephrostomy tube. No hydronephrosis.  2. Lung base pulmonary nodules, some of which are cavitary, likely   metastatic disease.  3. Right hepatic lesion (1.4 cm), suspicious for metastatic disease.  4. Diffuse intraluminal urinary bladder soft tissue consistent with   history of bladder cancer.        --- End of Report ---            TASHA WHATLEY MD; Attending Radiologist  This document has been electronically signed. 2022 10:40AM    < end of copied text >      a/p 57 yo female with one episode of postmenopausal bleeding  - pelvic sonogram recommended, determine endometrial thickness  - tumor markers recommended  - continue current management  - no acute GYN intervention at this time    -d/w Dr Fuller, house attending HPI: 55yo F admitted to medicine for recurrent UTI (last admission in [place] [facility], Ucx positive for ceftriaxone resistant Enterobacter cloacae) and hematuria. GYN consulted for one episode of vaginal bleeding, with passage of 1-2 small clots. Patient has known history of bladder cancer, last chemo 2022. Patient denies h/o vaginal bleeding prior to admission, denies vaginal discharge; pelvic pain.  Last pap smear   pob/gynhx:  ;  x5, most recent  . denies history of std's; no abnl pap smear; history of fibroids; menopause at age 53  pmhx: Anemia, asthma, bladder CA (not on Chemo from May'22), R Nephrostomy tube, solitary kidney presenting with hematuria, Parathyroid tumor.   pshx: hydronephrosis s/p Right Perc nephrostomy tube placement 2021, exchange 2021, myomectomy    all: nkda  sochx: no etoh/drug/tobacco use; denies h/o anxiety/depression    PE:  Vital Signs Last 24 Hrs  T(C): 37.2 (2022 13:06), Max: 37.2 (2022 13:06)  T(F): 99 (2022 13:06), Max: 99 (2022 13:06)  HR: 66 (2022 13:06) (56 - 66)  BP: 105/63 (2022 13:06) (104/57 - 125/77)  BP(mean): 69 (2022 20:37) (69 - 69)  RR: 18 (2022 13:06) (18 - 18)  SpO2: 100% (2022 13:06) (98% - 100%)    abd: +bs; soft, nt, nd  pelvis: no active vaginal bleeding. +1 drop stain appreciated on pad. patient refuses pelvic exam.     LABS:                        9.8    5.45  )-----------( 232      ( 2022 08:23 )             30.0     07-03    138  |  105  |  19<H>  ----------------------------<  84  4.1   |  24  |  0.83    Ca    9.9      2022 08:23        RADIOLOGY & ADDITIONAL STUDIES:  < from: CT Abdomen and Pelvis No Cont (22 @ 10:10) >  ACC: [id number] EXAM:  CT ABDOMEN AND PELVIS                          PROCEDURE DATE:  2022          INTERPRETATION:  CLINICAL INFORMATION: Right flank pain    COMPARISON: 2022    CONTRAST/COMPLICATIONS:  IV Contrast: NONE  Oral Contrast: NONE  Complications: None reported at time of study completion    PROCEDURE:  CT of the Abdomen and Pelvis was performed.  Sagittal and coronal reformats were performed.    FINDINGS:  LOWER CHEST: Redemonstration of basilar pulmonary nodules, some of which   are cavitary. Partially imaged cardiac lead.    Please note that evaluation of the abdominal organs and vascular   structures is limited by lack of intravenous contrast.    LIVER: 1.4 cm right hepatic lesion.  BILE DUCTS: Normal caliber.  GALLBLADDER: Within normal limits.  SPLEEN: Within normal limits.  PANCREAS: Within normal limits.  ADRENALS: Within normal limits.  KIDNEYS/URETERS: Right nephrostomy tube. No right hydronephrosis.   Nonobstructive right intrarenal calculus. Chronic severe left   hydronephrosis with cortical thinning and moderate left hydroureter.    BLADDER: Diffuse intraluminal soft tissue.  REPRODUCTIVE ORGANS: Prominent uterus.    BOWEL: No bowel obstruction. Appendix is within normal limits.  PERITONEUM: Mild pelvic fluid.  VESSELS: Within normal limits.  RETROPERITONEUM/LYMPH NODES: Retroperitoneal and pelvic adenopathy.  ABDOMINAL WALL: Postsurgical changes. Mild subcutaneous soft tissue edema.  BONES: Within normal limits.    IMPRESSION:    1. Right nephrostomy tube. No hydronephrosis.  2. Lung base pulmonary nodules, some of which are cavitary, likely   metastatic disease.  3. Right hepatic lesion (1.4 cm), suspicious for metastatic disease.  4. Diffuse intraluminal urinary bladder soft tissue consistent with   history of bladder cancer.        --- End of Report ---            TASHA WHATLEY MD; Attending Radiologist  This document has been electronically signed. 2022 10:40AM    < end of copied text >      a/p 57 yo female with one episode of postmenopausal bleeding  - pelvic sonogram recommended, determine endometrial thickness  - tumor markers recommended  - continue current management  - no acute GYN intervention at this time    -d/w Dr Fuller, [place] attending

## 2022-07-04 NOTE — PROGRESS NOTE ADULT - SUBJECTIVE AND OBJECTIVE BOX
INTERVAL HPI/OVERNIGHT EVENTS:  Patient seen,no issues  VITAL SIGNS:  T(F): 99 (07-04-22 @ 13:06)  HR: 66 (07-04-22 @ 13:06)  BP: 105/63 (07-04-22 @ 13:06)  RR: 18 (07-04-22 @ 13:06)  SpO2: 100% (07-04-22 @ 13:06)  Wt(kg): --    PHYSICAL EXAM:  awake  Constitutional:  Eyes:  ENMT:perrla  Neck:  Respiratory:clear  Cardiovascular:s1s2,m-none  Gastrointestinal:soft,bs pos  Extremities:  Vascular:  Neurological:no focal deficit  Musculoskeletal:    MEDICATIONS  (STANDING):  acetaminophen     Tablet .. 325 milliGRAM(s) Oral every 4 hours  atorvastatin 40 milliGRAM(s) Oral at bedtime  budesonide  80 MICROgram(s)/formoterol 4.5 MICROgram(s) Inhaler 2 Puff(s) Inhalation two times a day  cefepime   IVPB      cefepime   IVPB 1000 milliGRAM(s) IV Intermittent every 8 hours  cholecalciferol 1000 Unit(s) Oral daily  dextrose 5%. 1000 milliLiter(s) (50 mL/Hr) IV Continuous <Continuous>  dextrose 5%. 1000 milliLiter(s) (100 mL/Hr) IV Continuous <Continuous>  dextrose 50% Injectable 25 Gram(s) IV Push once  dextrose 50% Injectable 12.5 Gram(s) IV Push once  dextrose 50% Injectable 25 Gram(s) IV Push once  glucagon  Injectable 1 milliGRAM(s) IntraMuscular once  insulin lispro (ADMELOG) corrective regimen sliding scale   SubCutaneous three times a day before meals  insulin lispro (ADMELOG) corrective regimen sliding scale   SubCutaneous at bedtime  lidocaine   4% Patch 1 Patch Transdermal daily  OLANZapine 5 milliGRAM(s) Oral daily  polyethylene glycol 3350 17 Gram(s) Oral daily  senna 2 Tablet(s) Oral at bedtime  sodium chloride 0.9%. 1000 milliLiter(s) (60 mL/Hr) IV Continuous <Continuous>    MEDICATIONS  (PRN):  aluminum hydroxide/magnesium hydroxide/simethicone Suspension 30 milliLiter(s) Oral every 6 hours PRN Dyspepsia  dextrose Oral Gel 15 Gram(s) Oral once PRN Blood Glucose LESS THAN 70 milliGRAM(s)/deciliter  morphine  IR 7.5 milliGRAM(s) Oral every 6 hours PRN Severe Pain (7 - 10)  ondansetron    Tablet 4 milliGRAM(s) Oral two times a day PRN Nausea      Allergies    No Known Allergies    Intolerances        LABS:                        9.8    5.45  )-----------( 232      ( 03 Jul 2022 08:23 )             30.0     07-03    138  |  105  |  19<H>  ----------------------------<  84  4.1   |  24  |  0.83    Ca    9.9      03 Jul 2022 08:23            RADIOLOGY & ADDITIONAL TESTS:      Assessment and Plan:   · Assessment	  Pt is a 57 yo F from home with PMH of Anemia, asthma, bladder CA (not on Chemo from May'22), R Nephrostomy tube, solitary kidney presenting with hematuria and abdominal pain for a week. endorses similar in the past 2/2 uti. patient noting left sided cp x 2 days. In ED trop negative x2, no acute changes on EKG. Had nephrostomy tube recently changed 6/20/22. Presented to ED with c/o right flank pain. Admitted for positive UTI with hematuria which has now resolved. Dr. Rodriguez (ID) following, started on Cefepime 1g daily. Patient continues to c/o Right flank pain, endorses concern for kidney stone (has hx of). IF symptoms do not resolve by am, patient agrees for CT abd to eval r/o kidney stones.     6/29-Persistent right flank pain radiating to left side as well.  CT A/P ordered, showed:  1. Right nephrostomy tube. No hydronephrosis.  2. Lung base pulmonary nodules, some of which are cavitary, likely   metastatic disease.  3. Right hepatic lesion (1.4 cm), suspicious for metastatic disease.  4. Diffuse intraluminal urinary bladder soft tissue consistent with   history of bladder cancer.    Hem/onc [facility] has been following pt. on previous admissions, consulted ALEJANDRINA Camp, will see pt. tomorrow.      6/30-Pt. with persisting right flank pain, urology consulted, note appreciated, no urologic interventions indicated at this time.  Pt. is followed by OP urologist Dr. Trinidad [phone].  Pt. was also seen by hem/onc, note appreciated.  Pt. is followed by OP oncologist Dr. Hoyt at [facility] [phone].  Discharge planning likely back to home when pain is well controlled and cleared by ID, likely 24-48 hrs.             COVID-19 Negative Lab Result:  · COVID-19 Negative Lab Result	COVID-19 ruled in despite negative lab finding     Problem/Plan - 1:  ·  Problem: Metastasis from bladder cancer.   ·  Plan: CT A/P shows Lung base pulmonary nodules, some of which are cavitary, likely   metastatic disease.  Right hepatic lesion (1.4 cm), suspicious for metastatic disease.  Diffuse intraluminal urinary bladder soft tissue consistent with history of bladder cancer.  -Hem/Onc [facility] consulted, note appreciated  -OP f/u with oncology Dr. Hoyt.  -palliative care f/up for GOC     Problem/Plan - 2:  ·  Problem: Severe protein-calorie malnutrition.   ·  Plan: likely due to metastatic disease  -BMI 15.1, serum Albumin 3.0  -Nutrition consult requested.     Problem/Plan - 3:  ·  Problem: UTI (urinary tract infection).   ·  Plan: p/w hematuria and abdominal pain for a week  Has a right nephrostomy tube replaced a week ago 6/20/22  Last admission in [facility], Ucx positive for ceftriaxone resistant Enterobacter cloacae  Urinalysis positive for UTI now on Cefepime   cont abx till 7/05  ID Dr. Rodriguez following  Urology consulted, note appreciated, no urologic interventions indicated.     Problem/Plan - 4:  ·  Problem: Pain.   ·  Plan: right flank pain refractory to Tylenol  -Pain mgnt consulted  -Supportive care.     Problem/Plan - 5:  ·  Problem: Chest pain.   ·  Plan: resolved  negative troponin x2  no acute ischemic changes on EKG   if pt c/o chest pain, perform EKG.     Problem/Plan - 6:  ·  Problem: Bladder cancer.   ·  Plan: hx of bladder cancer  last chemo 5/2022   follows at [facility]  Pain management consulted.     Problem/Plan - 7:  ·  Problem: Prophylactic measure.   ·  Plan: pt on SCD for DVt prphylaxis.

## 2022-07-04 NOTE — PROGRESS NOTE ADULT - ASSESSMENT
57 yo F from home with PMH of Anemia, asthma, bladder CA (not on Chemo from May'22), R Nephrostomy tube, solitary kidney presenting with hematuria and abdominal pain for a week. endorses similar in the past 2/2 uti. patient noting left sided cp x 2 days. denies n, v. endorses noting fever today. patient had nephrostomy tube changed 1 week ago.     # Bladder CA  # Normocytic Anemia  hematuria in 2019 lead to cystoscopy which was (-) as per pt, PET 2019 (-)  in 2020 pt found to have bladder mass which was Bx and tx'd with three cycles of chemo at [facility] then discontinued d/t pyelonephritis  she then switched Oncologists and now follows with Dr. Hoyt at [facility] [phone], pt states currently on [place]/Carbo last given in May 2022  chronic UTI's and follows with Urologist Dr. Trinidad [phone]  NT exchange one week ago  CT A/P shows Right NT, basilar pulm nodules likely met dz, Right hepatic lesion 1.4cm susp for met dz and known bladder ca findings  Recs:   -Will reach out to Dr. Hoyt [phone] again in AM   -unclear if CT is c/w POD, need comparison  -On abx for complicated UTI, ID following  -Urology consult noted   -Pain Mgmt  -Nutrition on board   -No chemo to be given during admission  -anemia c/w baseline from last months admission  -upon discharge pt to f/u with her primary Oncologist Dr. Hoyt

## 2022-07-04 NOTE — CONSULT NOTE ADULT - ASSESSMENT
a/p 55 yo female with one episode of postmenopausal bleeding  - pelvic sonogram recommended, determine endometrial thickness  - tumor markers recommended  - continue current management  - no acute GYN intervention at this time    -d/w Dr Fuller, house attending a/p 55 yo female admitted to medicine for recurrent UTI/hematuria, h/o one episode of postmenopausal bleeding  - pelvic sonogram recommended, determine endometrial thickness  - tumor markers recommended  - continue current management  - no acute GYN intervention at this time    -d/w Dr Fuller, house attending

## 2022-07-04 NOTE — CONSULT NOTE ADULT - REASON FOR ADMISSION
UTI and chest pain

## 2022-07-04 NOTE — PROGRESS NOTE ADULT - ASSESSMENT
Patient is a 56y old  Female with PMH of Anemia, asthma, bladder CA (not on Chemo from May'22), Right Nephrostomy tube,  changed 1 week ago. now presents to the ER for evaluation of hematuria and abdominal pain for a week.  On admission, she found to have no fever, BUT positive Urine analysis. She has started on Ceftriaxone and the ID consult requested to assist with further evaluation and antibiotic management.     # Complicated UTI - in the setting of Right nephrostomy tube- s/p changed in 6/20- Urine culture has no growth.   # H/O recent Enterobacter UTI- 6/10/22  #  Bladder CA (not on Chemo from May'22),     would recommend:    1. OOB to chair   2. Management of Bladder cancer as per Hem/Onc  3. Continue Cefepime based on previous C & S, until 7/5/22, may change to oral Levaquin on discharge if QTC is less than 500  4. Pain management as needed      Attending Attestation:    Spent more than 35 minutes on total encounter, more than 50 % of the visit was spent counseling and/or coordinating care by the Attending physician.   Patient is a 56y old  Female with PMH of Anemia, asthma, bladder CA (not on Chemo from May'22), Right Nephrostomy tube,  changed 1 week ago. now presents to the ER for evaluation of hematuria and abdominal pain for a week.  On admission, she found to have no fever, BUT positive Urine analysis. She has started on Ceftriaxone and the ID consult requested to assist with further evaluation and antibiotic management.     # Complicated UTI - in the setting of Right nephrostomy tube- s/p changed in 6/20- Urine culture has no growth.   # H/O recent Enterobacter UTI- 6/10/22  #  Bladder CA (not on Chemo from May'22)  # Vaginal bleeding    would recommend:    1. Management orf Vaginal Bleeding as per OB/GYN   2. Management of Bladder cancer as per Hem/Onc  3. Continue Cefepime based on previous C & S, until 7/5/22, may change to oral Levaquin on discharge if QTC is less than 500  4. Pain management as needed  5. Monitor H/H and transfuse as needed    Attending Attestation:    Spent more than 35 minutes on total encounter, more than 50 % of the visit was spent counseling and/or coordinating care by the Attending physician.

## 2022-07-04 NOTE — PROGRESS NOTE ADULT - SUBJECTIVE AND OBJECTIVE BOX
Patient is seen and examined at the bed side, is afebrile. The WBC count and kidney function stay normal.       REVIEW OF SYSTEMS All other review systems are negative      ALLERGIES: No Known Allergies      Vital Signs Last 24 Hrs  T(C): 37.2 (2022 13:06), Max: 37.2 (2022 13:06)  T(F): 99 (2022 13:06), Max: 99 (2022 13:06)  HR: 66 (2022 13:06) (56 - 66)  BP: 105/63 (2022 13:06) (104/57 - 125/77)  BP(mean): 69 (2022 20:37) (69 - 69)  RR: 18 (2022 13:06) (18 - 18)  SpO2: 100% (2022 13:06) (98% - 100%)      PHYSICAL EXAM:  GENERAL: Not in distress   CHEST/LUNG:  Not using accessory muscles   HEART: s1 and s2 present  ABDOMEN:  Nontender and  Nondistended  : Right nephrostomy tube in placed   EXTREMITIES: No pedal  edema  CNS: Awake and Alert      LABS:                        9.8    5.45  )-----------( 232      ( 2022 08:23 )             30.0                           10.6   5.69  )-----------( 246      ( 2022 06:58 )             32.2         07-03    138  |  105  |  19<H>  ----------------------------<  84  4.1   |  24  |  0.83    Ca    9.9      2022 08:23    07-01    137  |  104  |  22<H>  ----------------------------<  95  4.0   |  29  |  1.05    Ca    10.4      2022 07:23      TPro  7.6  /  Alb  3.0<L>  /  TBili  0.3  /  DBili  x   /  AST  13  /  ALT  16  /  AlkPhos  95  06-28    PT/INR - ( 2022 14:43 )   PT: 13.1 sec;   INR: 1.10 ratio      PTT - ( 2022 14:43 )  PTT:28.9 sec      CAPILLARY BLOOD GLUCOSE  POCT Blood Glucose.: 64 mg/dL (2022 12:47)  POCT Blood Glucose.: 88 mg/dL (2022 19:25)  POCT Blood Glucose.: 99 mg/dL (2022 14:28)        Urinalysis Basic - ( 2022 16:34 )  Color: Yellow / Appearance: Slightly Turbid / S.015 / pH: x  Gluc: x / Ketone: Small  / Bili: Negative / Urobili: Negative   Blood: x / Protein: 500 mg/dL / Nitrite: Negative   Leuk Esterase: Small / RBC: 10-25 /HPF / WBC 6-10 /HPF   Sq Epi: x / Non Sq Epi: Moderate /HPF / Bacteria: Moderate /HPF        MEDICATIONS  (STANDING):    acetaminophen     Tablet .. 325 milliGRAM(s) Oral every 4 hours  atorvastatin 40 milliGRAM(s) Oral at bedtime  budesonide  80 MICROgram(s)/formoterol 4.5 MICROgram(s) Inhaler 2 Puff(s) Inhalation two times a day  cefepime   IVPB 1000 milliGRAM(s) IV Intermittent every 8 hours  cefepime   IVPB      cholecalciferol 1000 Unit(s) Oral daily  dextrose 5%. 1000 milliLiter(s) (50 mL/Hr) IV Continuous <Continuous>  dextrose 5%. 1000 milliLiter(s) (100 mL/Hr) IV Continuous <Continuous>  dextrose 50% Injectable 25 Gram(s) IV Push once  dextrose 50% Injectable 12.5 Gram(s) IV Push once  dextrose 50% Injectable 25 Gram(s) IV Push once  glucagon  Injectable 1 milliGRAM(s) IntraMuscular once  insulin lispro (ADMELOG) corrective regimen sliding scale   SubCutaneous three times a day before meals  insulin lispro (ADMELOG) corrective regimen sliding scale   SubCutaneous at bedtime  lidocaine   4% Patch 1 Patch Transdermal daily  OLANZapine 5 milliGRAM(s) Oral daily  polyethylene glycol 3350 17 Gram(s) Oral daily  senna 2 Tablet(s) Oral at bedtime  sodium chloride 0.9%. 1000 milliLiter(s) (60 mL/Hr) IV Continuous <Continuous>          RADIOLOGY & ADDITIONAL TESTS:    22: CT Abdomen and Pelvis No Cont (22 @ 10:10) >  1. Right nephrostomy tube. No hydronephrosis.  2. Lung base pulmonary nodules, some of which are cavitary, likely  metastatic disease.  3. Right hepatic lesion (1.4 cm), suspicious for metastatic disease.  4. Diffuse intraluminal urinary bladder soft tissue consistent with  history of bladder cancer.      22 : Xray Chest 2 Views PA/Lat (22 @ 18:35):  Multiple bilateral pulmonary nodules again seen, for the   most part unchanged, although a 2.4 cm cavitary left lower lobe nodule is  larger, previously 2 cm.        MICROBIOLOGY DATA:    Culture - Urine (22 @ 21:07)   Specimen Source: Clean Catch Clean Catch (Midstream)   Culture Results: <10,000 CFU/mL Normal Urogenital Vandana     COVID-19 PCR . (22 @ 18:11)   COVID-19 PCR: NotDetec               Patient is seen and examined at the bed side, is afebrile. The events noted regarding Vaginal bleeding.       REVIEW OF SYSTEMS All other review systems are negative      ALLERGIES: No Known Allergies      Vital Signs Last 24 Hrs  T(C): 37.2 (2022 13:06), Max: 37.2 (2022 13:06)  T(F): 99 (2022 13:06), Max: 99 (2022 13:06)  HR: 66 (2022 13:06) (56 - 66)  BP: 105/63 (2022 13:06) (104/57 - 125/77)  BP(mean): 69 (2022 20:37) (69 - 69)  RR: 18 (2022 13:06) (18 - 18)  SpO2: 100% (2022 13:06) (98% - 100%)      PHYSICAL EXAM:  GENERAL: Not in distress   CHEST/LUNG:  Not using accessory muscles   HEART: s1 and s2 present  ABDOMEN:  Nontender and  Nondistended  : Right nephrostomy tube in placed   EXTREMITIES: No pedal  edema  CNS: Awake and Alert      LABS:                        9.8    5.45  )-----------( 232      ( 2022 08:23 )             30.0                           10.6   5.69  )-----------( 246      ( 2022 06:58 )             32.2         07-03    138  |  105  |  19<H>  ----------------------------<  84  4.1   |  24  |  0.83    Ca    9.9      2022 08:23    07-01    137  |  104  |  22<H>  ----------------------------<  95  4.0   |  29  |  1.05    Ca    10.4      2022 07:23      TPro  7.6  /  Alb  3.0<L>  /  TBili  0.3  /  DBili  x   /  AST  13  /  ALT  16  /  AlkPhos  95  06-28    PT/INR - ( 2022 14:43 )   PT: 13.1 sec;   INR: 1.10 ratio      PTT - ( 2022 14:43 )  PTT:28.9 sec      CAPILLARY BLOOD GLUCOSE  POCT Blood Glucose.: 64 mg/dL (2022 12:47)  POCT Blood Glucose.: 88 mg/dL (2022 19:25)  POCT Blood Glucose.: 99 mg/dL (2022 14:28)        Urinalysis Basic - ( 2022 16:34 )  Color: Yellow / Appearance: Slightly Turbid / S.015 / pH: x  Gluc: x / Ketone: Small  / Bili: Negative / Urobili: Negative   Blood: x / Protein: 500 mg/dL / Nitrite: Negative   Leuk Esterase: Small / RBC: 10-25 /HPF / WBC 6-10 /HPF   Sq Epi: x / Non Sq Epi: Moderate /HPF / Bacteria: Moderate /HPF        MEDICATIONS  (STANDING):    acetaminophen     Tablet .. 325 milliGRAM(s) Oral every 4 hours  atorvastatin 40 milliGRAM(s) Oral at bedtime  budesonide  80 MICROgram(s)/formoterol 4.5 MICROgram(s) Inhaler 2 Puff(s) Inhalation two times a day  cefepime   IVPB 1000 milliGRAM(s) IV Intermittent every 8 hours  cefepime   IVPB      cholecalciferol 1000 Unit(s) Oral daily  dextrose 5%. 1000 milliLiter(s) (50 mL/Hr) IV Continuous <Continuous>  dextrose 5%. 1000 milliLiter(s) (100 mL/Hr) IV Continuous <Continuous>  dextrose 50% Injectable 25 Gram(s) IV Push once  dextrose 50% Injectable 12.5 Gram(s) IV Push once  dextrose 50% Injectable 25 Gram(s) IV Push once  glucagon  Injectable 1 milliGRAM(s) IntraMuscular once  insulin lispro (ADMELOG) corrective regimen sliding scale   SubCutaneous three times a day before meals  insulin lispro (ADMELOG) corrective regimen sliding scale   SubCutaneous at bedtime  lidocaine   4% Patch 1 Patch Transdermal daily  OLANZapine 5 milliGRAM(s) Oral daily  polyethylene glycol 3350 17 Gram(s) Oral daily  senna 2 Tablet(s) Oral at bedtime  sodium chloride 0.9%. 1000 milliLiter(s) (60 mL/Hr) IV Continuous <Continuous>          RADIOLOGY & ADDITIONAL TESTS:    22: CT Abdomen and Pelvis No Cont (22 @ 10:10) >  1. Right nephrostomy tube. No hydronephrosis.  2. Lung base pulmonary nodules, some of which are cavitary, likely  metastatic disease.  3. Right hepatic lesion (1.4 cm), suspicious for metastatic disease.  4. Diffuse intraluminal urinary bladder soft tissue consistent with  history of bladder cancer.      22 : Xray Chest 2 Views PA/Lat (22 @ 18:35):  Multiple bilateral pulmonary nodules again seen, for the   most part unchanged, although a 2.4 cm cavitary left lower lobe nodule is  larger, previously 2 cm.        MICROBIOLOGY DATA:    Culture - Urine (22 @ 21:07)   Specimen Source: Clean Catch Clean Catch (Midstream)   Culture Results: <10,000 CFU/mL Normal Urogenital Vandana     COVID-19 PCR . (22 @ 18:11)   COVID-19 PCR: NotDetec

## 2022-07-04 NOTE — PROGRESS NOTE ADULT - SUBJECTIVE AND OBJECTIVE BOX
S: Patient seen this afternoon. Feels fatigued. Wants to remove the toth.     Vital Signs Last 24 Hrs  T(C): 37.2 (04 Jul 2022 13:06), Max: 37.2 (04 Jul 2022 13:06)  T(F): 99 (04 Jul 2022 13:06), Max: 99 (04 Jul 2022 13:06)  HR: 66 (04 Jul 2022 13:06) (56 - 66)  BP: 105/63 (04 Jul 2022 13:06) (104/57 - 125/77)  BP(mean): 69 (03 Jul 2022 20:37) (69 - 69)  RR: 18 (04 Jul 2022 13:06) (18 - 18)  SpO2: 100% (04 Jul 2022 13:06) (98% - 100%)     Gen: NAD, cachexia   CVS: s1s2   Resp: ctabl   GI: soft   CNS: no focal deficits   : toth +     CBC Full  -  ( 03 Jul 2022 08:23 )  WBC Count : 5.45 K/uL  RBC Count : 3.35 M/uL  Hemoglobin : 9.8 g/dL  Hematocrit : 30.0 %  Platelet Count - Automated : 232 K/uL  Mean Cell Volume : 89.6 fl  Mean Cell Hemoglobin : 29.3 pg  Mean Cell Hemoglobin Concentration : 32.7 gm/dL  Auto Neutrophil # : x  Auto Lymphocyte # : x  Auto Monocyte # : x  Auto Eosinophil # : x  Auto Basophil # : x  Auto Neutrophil % : x  Auto Lymphocyte % : x  Auto Monocyte % : x  Auto Eosinophil % : x  Auto Basophil % : x    MEDICATIONS  (STANDING):  acetaminophen     Tablet .. 325 milliGRAM(s) Oral every 4 hours  atorvastatin 40 milliGRAM(s) Oral at bedtime  budesonide  80 MICROgram(s)/formoterol 4.5 MICROgram(s) Inhaler 2 Puff(s) Inhalation two times a day  cefepime   IVPB 1000 milliGRAM(s) IV Intermittent every 8 hours  cefepime   IVPB      cholecalciferol 1000 Unit(s) Oral daily  dextrose 5%. 1000 milliLiter(s) (50 mL/Hr) IV Continuous <Continuous>  dextrose 5%. 1000 milliLiter(s) (100 mL/Hr) IV Continuous <Continuous>  dextrose 50% Injectable 25 Gram(s) IV Push once  dextrose 50% Injectable 12.5 Gram(s) IV Push once  dextrose 50% Injectable 25 Gram(s) IV Push once  glucagon  Injectable 1 milliGRAM(s) IntraMuscular once  insulin lispro (ADMELOG) corrective regimen sliding scale   SubCutaneous three times a day before meals  insulin lispro (ADMELOG) corrective regimen sliding scale   SubCutaneous at bedtime  lidocaine   4% Patch 1 Patch Transdermal daily  OLANZapine 5 milliGRAM(s) Oral daily  polyethylene glycol 3350 17 Gram(s) Oral daily  senna 2 Tablet(s) Oral at bedtime  sodium chloride 0.9%. 1000 milliLiter(s) (60 mL/Hr) IV Continuous <Continuous>    MEDICATIONS  (PRN):  aluminum hydroxide/magnesium hydroxide/simethicone Suspension 30 milliLiter(s) Oral every 6 hours PRN Dyspepsia  dextrose Oral Gel 15 Gram(s) Oral once PRN Blood Glucose LESS THAN 70 milliGRAM(s)/deciliter  morphine  IR 7.5 milliGRAM(s) Oral every 6 hours PRN Severe Pain (7 - 10)  ondansetron    Tablet 4 milliGRAM(s) Oral two times a day PRN Nausea

## 2022-07-04 NOTE — CONSULT NOTE ADULT - CONSULT REQUESTED DATE/TIME
28-Jun-2022 13:27
29-Jun-2022
30-Jun-2022 12:11
30-Jun-2022 14:09
30-Jun-2022 15:45
04-Jul-2022 18:00

## 2022-07-05 NOTE — PROGRESS NOTE ADULT - NS ATTEND AMEND GEN_ALL_CORE FT
# Bladder CA: Out-pt f/u with Dr. Hoyt   -Hold any anti-neoplastic Tx while in-patient   -Supportive care; pain management    -Palliative care f/u   -Vaginal bleed: GYN consulted, plan for pelvic US   We will follow

## 2022-07-05 NOTE — PROGRESS NOTE ADULT - ASSESSMENT
Patient is a 56y old  Female with PMH of Anemia, asthma, bladder CA (not on Chemo from May'22), Right Nephrostomy tube,  changed 1 week ago. now presents to the ER for evaluation of hematuria and abdominal pain for a week.  On admission, she found to have no fever, BUT positive Urine analysis. She has started on Ceftriaxone and the ID consult requested to assist with further evaluation and antibiotic management.     # Complicated UTI - in the setting of Right nephrostomy tube- s/p changed in 6/20- Urine culture has no growth.   # H/O recent Enterobacter UTI- 6/10/22  #  Bladder CA (not on Chemo from May'22)  # Vaginal bleeding    would recommend:    1. Management orf Vaginal Bleeding as per OB/GYN   2. Management of Bladder cancer as per Hem/Onc  3. Continue Cefepime based on previous C & S, until 7/5/22, may change to oral Levaquin on discharge if QTC is less than 500  4. Pain management as needed  5. Monitor H/H and transfuse as needed    Attending Attestation:    Spent more than 35 minutes on total encounter, more than 50 % of the visit was spent counseling and/or coordinating care by the Attending physician. Patient is a 56y old  Female with PMH of Anemia, asthma, bladder CA (not on Chemo from May'22), Right Nephrostomy tube,  changed 1 week ago. now presents to the ER for evaluation of hematuria and abdominal pain for a week.  On admission, she found to have no fever, BUT positive Urine analysis. She has started on Ceftriaxone and the ID consult requested to assist with further evaluation and antibiotic management.     # Complicated UTI - in the setting of Right nephrostomy tube- s/p changed in 6/20- Urine culture has no growth.   # H/O recent Enterobacter UTI- 6/10/22  #  Bladder CA (not on Chemo from May'22)  # Vaginal bleeding    would recommend:    1. Pain management as needed  2. Management of Bladder cancer as per Hem/Onc  3. Discontinue Cefepime after Today's doses  4. Monitor H/H and transfuse as needed    Attending Attestation:    Spent more than 35 minutes on total encounter, more than 50 % of the visit was spent counseling and/or coordinating care by the Attending physician.

## 2022-07-05 NOTE — PROGRESS NOTE ADULT - SUBJECTIVE AND OBJECTIVE BOX
Source of information: , Chart review  Patient language: English  : n/a    CC:      This is a 56yFemale patient who presents to the hospital for Patient is a 56y old  Female who presents with a chief complaint of UTI and chest pain (05 Jul 2022 14:57)  .       Patient stated goal for pain control: to be able to take deep breaths, get out of bed to chair and ambulate with tolerable pain control.     PAIN SCORE:         SCALE USED: (1-10 NRS)      PAST MEDICAL & SURGICAL HISTORY:  Anemia      Asthma      HLD (hyperlipidemia)      Pacemaker  [employer]      Bladder cancer      HTN (hypertension)      Parathyroid tumor      Liver cyst      Hydronephrosis  has right Nephrostomy tube - dressing intact      Bradycardia      History of renal calculi      H/O myomectomy      Presence of cardiac pacemaker      H/O hydronephrosis  s/p Right Perc nephrostomy tube placement 02/2021, exchange 06/2021          FAMILY HISTORY:  Family history of prostate cancer (Father)    Family history of CHF (congestive heart failure) (Father)    Family history of atrial fibrillation (Father)          SOCIAL HISTORY:  [ ] Denies Smoking, Alcohol, or Drug Use    Allergies    No Known Allergies    Intolerances        MEDICATIONS:    MEDICATIONS  (STANDING):  acetaminophen     Tablet .. 325 milliGRAM(s) Oral every 4 hours  atorvastatin 40 milliGRAM(s) Oral at bedtime  budesonide  80 MICROgram(s)/formoterol 4.5 MICROgram(s) Inhaler 2 Puff(s) Inhalation two times a day  cefepime   IVPB 1000 milliGRAM(s) IV Intermittent every 8 hours  cefepime   IVPB      cholecalciferol 1000 Unit(s) Oral daily  dextrose 5%. 1000 milliLiter(s) (50 mL/Hr) IV Continuous <Continuous>  dextrose 5%. 1000 milliLiter(s) (100 mL/Hr) IV Continuous <Continuous>  dextrose 50% Injectable 25 Gram(s) IV Push once  dextrose 50% Injectable 12.5 Gram(s) IV Push once  dextrose 50% Injectable 25 Gram(s) IV Push once  glucagon  Injectable 1 milliGRAM(s) IntraMuscular once  insulin lispro (ADMELOG) corrective regimen sliding scale   SubCutaneous three times a day before meals  insulin lispro (ADMELOG) corrective regimen sliding scale   SubCutaneous at bedtime  lidocaine   4% Patch 1 Patch Transdermal daily  OLANZapine 5 milliGRAM(s) Oral daily  polyethylene glycol 3350 17 Gram(s) Oral daily  senna 2 Tablet(s) Oral at bedtime  sodium chloride 0.9%. 1000 milliLiter(s) (60 mL/Hr) IV Continuous <Continuous>    MEDICATIONS  (PRN):  acetaminophen   IVPB .. 1000 milliGRAM(s) IV Intermittent once PRN Moderate Pain (4 - 6)  aluminum hydroxide/magnesium hydroxide/simethicone Suspension 30 milliLiter(s) Oral every 6 hours PRN Dyspepsia  dextrose Oral Gel 15 Gram(s) Oral once PRN Blood Glucose LESS THAN 70 milliGRAM(s)/deciliter  morphine  IR 7.5 milliGRAM(s) Oral every 6 hours PRN Severe Pain (7 - 10)  ondansetron    Tablet 4 milliGRAM(s) Oral two times a day PRN Nausea      Vital Signs Last 24 Hrs  T(C): 36.3 (05 Jul 2022 13:01), Max: 36.6 (04 Jul 2022 21:05)  T(F): 97.4 (05 Jul 2022 13:01), Max: 97.8 (04 Jul 2022 21:05)  HR: 70 (05 Jul 2022 13:01) (53 - 73)  BP: 104/57 (05 Jul 2022 13:01) (97/62 - 124/62)  BP(mean): 76 (05 Jul 2022 05:13) (70 - 76)  RR: 18 (05 Jul 2022 13:01) (18 - 18)  SpO2: 98% (05 Jul 2022 13:01) (97% - 100%)    LABS:                          9.8    5.80  )-----------( 220      ( 05 Jul 2022 07:21 )             30.8     07-05    139  |  105  |  19<H>  ----------------------------<  91  4.4   |  27  |  0.92    Ca    9.8      05 Jul 2022 07:21            CAPILLARY BLOOD GLUCOSE      POCT Blood Glucose.: 89 mg/dL (05 Jul 2022 16:19)  POCT Blood Glucose.: 83 mg/dL (05 Jul 2022 11:13)  POCT Blood Glucose.: 69 mg/dL (05 Jul 2022 07:44)  POCT Blood Glucose.: 90 mg/dL (04 Jul 2022 21:29)      Radiology:    Drug Screen:        REVIEW OF SYSTEMS:  CONSTITUTIONAL: No fever or fatigue  RESPIRATORY: No cough, wheezing, chills or hemoptysis; No shortness of breath  CARDIOVASCULAR: No chest pain, palpitations, dizziness, or leg swelling  GASTROINTESTINAL: No abdominal or epigastric pain. No nausea, vomiting; No diarrhea or constipation.   GENITOURINARY: No dysuria, frequency, hematuria, retention or incontinence  MUSCULOSKELETAL: No joint pain or swelling; No muscle, back, or extremity pain, no upper or lower motor strength weakness, no saddle anesthesia, bowel/bladder incontinence, no falls   NEURO: No headaches, No numbness/tingling b/l LE, No weakness  PSYCHIATRIC: No depression, anxiety, mood swings, or difficulty sleeping    PHYSICAL EXAM:  GENERAL:  Alert & Oriented X3, NAD, Good concentration  CHEST/LUNG: Clear to auscultation bilaterally; No rales, rhonchi, wheezing, or rubs  HEART: Regular rate and rhythm; No murmurs, rubs, or gallops  ABDOMEN: Soft, Nontender, Nondistended; Bowel sounds present  EXTREMITIES:  2+ Peripheral Pulses, No cyanosis, or edema  MUSCULOSKELETAL: + decreased rom Motor Strength 5/5 B/L upper and lower extremities; moves all extremities equally against gravity; ROM intact; negative SLR  SKIN: No rashes or lesions    Risk factors associated with adverse outcomes related to opioid treatment  [ ]  Concurrent benzodiazepine use  [ ]  History/ Active substance use or alcohol use disorder  [ ] Psychiatric co-morbidity  [ ] Sleep apnea  [ ] COPD  [ ] BMI> 35  [ ] Liver dysfunction  [ ] Renal dysfunction  [ ] CHF  [ ] Smoker  [ ]  Age > 60 years    [ ]  [place]  Reviewed and Copied to Chart. See below.    Plan of care and goal oriented pain management treatment options were discussed with patient and /or primary care giver; all questions and concerns were addressed and care was aligned with patient's wishes.    Educated patient on goal oriented pain management treatment options     07-05-22 @ 17:03 Source of information: , Chart review  Patient language: English  : n/a    CC:  lower abdominal pain    This is a 56yFemale patient who presents to the hospital for Patient is a 56y old  Female who presents with a chief complaint of UTI and chest pain (05 Jul 2022 14:57)    Pt with bladder CA (not on Chemo from May'22), R Nephrostomy tube, solitary kidney presenting with hematuria and abdominal pain for a week. + lower abdominal pain.  Pt refusing to taking meds stronger than acetaminophen due to constipation from opioids.  Pt underwent pelvic sonogram today.    PAIN SCORE:     5/10    SCALE USED: (1-10 NRS)      PAST MEDICAL & SURGICAL HISTORY:  Anemia      Asthma      HLD (hyperlipidemia)      Pacemaker  [employer]      Bladder cancer      HTN (hypertension)      Parathyroid tumor      Liver cyst      Hydronephrosis  has right Nephrostomy tube - dressing intact      Bradycardia      History of renal calculi      H/O myomectomy      Presence of cardiac pacemaker      H/O hydronephrosis  s/p Right Perc nephrostomy tube placement 02/2021, exchange 06/2021          FAMILY HISTORY:  Family history of prostate cancer (Father)    Family history of CHF (congestive heart failure) (Father)    Family history of atrial fibrillation (Father)          SOCIAL HISTORY:  [x ] Denies Smoking, Alcohol, or Drug Use    Allergies    No Known Allergies    Intolerances        MEDICATIONS:    MEDICATIONS  (STANDING):  acetaminophen     Tablet .. 325 milliGRAM(s) Oral every 4 hours  atorvastatin 40 milliGRAM(s) Oral at bedtime  budesonide  80 MICROgram(s)/formoterol 4.5 MICROgram(s) Inhaler 2 Puff(s) Inhalation two times a day  cefepime   IVPB 1000 milliGRAM(s) IV Intermittent every 8 hours  cefepime   IVPB      cholecalciferol 1000 Unit(s) Oral daily  dextrose 5%. 1000 milliLiter(s) (50 mL/Hr) IV Continuous <Continuous>  dextrose 5%. 1000 milliLiter(s) (100 mL/Hr) IV Continuous <Continuous>  dextrose 50% Injectable 25 Gram(s) IV Push once  dextrose 50% Injectable 12.5 Gram(s) IV Push once  dextrose 50% Injectable 25 Gram(s) IV Push once  glucagon  Injectable 1 milliGRAM(s) IntraMuscular once  insulin lispro (ADMELOG) corrective regimen sliding scale   SubCutaneous three times a day before meals  insulin lispro (ADMELOG) corrective regimen sliding scale   SubCutaneous at bedtime  lidocaine   4% Patch 1 Patch Transdermal daily  OLANZapine 5 milliGRAM(s) Oral daily  polyethylene glycol 3350 17 Gram(s) Oral daily  senna 2 Tablet(s) Oral at bedtime  sodium chloride 0.9%. 1000 milliLiter(s) (60 mL/Hr) IV Continuous <Continuous>    MEDICATIONS  (PRN):  acetaminophen   IVPB .. 1000 milliGRAM(s) IV Intermittent once PRN Moderate Pain (4 - 6)  aluminum hydroxide/magnesium hydroxide/simethicone Suspension 30 milliLiter(s) Oral every 6 hours PRN Dyspepsia  dextrose Oral Gel 15 Gram(s) Oral once PRN Blood Glucose LESS THAN 70 milliGRAM(s)/deciliter  morphine  IR 7.5 milliGRAM(s) Oral every 6 hours PRN Severe Pain (7 - 10)  ondansetron    Tablet 4 milliGRAM(s) Oral two times a day PRN Nausea      Vital Signs Last 24 Hrs  T(C): 36.3 (05 Jul 2022 13:01), Max: 36.6 (04 Jul 2022 21:05)  T(F): 97.4 (05 Jul 2022 13:01), Max: 97.8 (04 Jul 2022 21:05)  HR: 70 (05 Jul 2022 13:01) (53 - 73)  BP: 104/57 (05 Jul 2022 13:01) (97/62 - 124/62)  BP(mean): 76 (05 Jul 2022 05:13) (70 - 76)  RR: 18 (05 Jul 2022 13:01) (18 - 18)  SpO2: 98% (05 Jul 2022 13:01) (97% - 100%)    LABS:                          9.8    5.80  )-----------( 220      ( 05 Jul 2022 07:21 )             30.8     07-05    139  |  105  |  19<H>  ----------------------------<  91  4.4   |  27  |  0.92    Ca    9.8      05 Jul 2022 07:21            CAPILLARY BLOOD GLUCOSE      POCT Blood Glucose.: 89 mg/dL (05 Jul 2022 16:19)  POCT Blood Glucose.: 83 mg/dL (05 Jul 2022 11:13)  POCT Blood Glucose.: 69 mg/dL (05 Jul 2022 07:44)  POCT Blood Glucose.: 90 mg/dL (04 Jul 2022 21:29)      Radiology:  < from: US Pelvis Complete (US Pelvis Complete .) (07.05.22 @ 16:13) >    ACC: [id number] EXAM:  US PELVIC COMPLETE                          PROCEDURE DATE:  07/05/2022          INTERPRETATION:  CLINICAL INFORMATION: Postmenopausal bleeding. Evaluate   endometrial thickness.    COMPARISON: CT abdomen/pelvis 6/29/2022.    TECHNIQUE:  Transabdominal pelvic sonogram only. Patient declined endovaginal   examination.    FINDINGS:  There is an 8 cm soft tissue mass with central cystic change noted within   the central pelvis; reference should be made to the report of CT   examination performed 6/29/2022. Posterior to the central pelvic mass a   structure measuring 5.6 x 3.8 x 3.5 cm is identified, which may represent   a portion of the uterus (lower uterine segment). The endometrium is not   visualized.    Fluid: No gross free pelvic fluid identified.    IMPRESSION:  As above.    < end of copied text >      Drug Screen:        REVIEW OF SYSTEMS:  CONSTITUTIONAL: No fever or fatigue  RESPIRATORY: No cough, wheezing, chills or hemoptysis; No shortness of breath  CARDIOVASCULAR: No chest pain, palpitations, dizziness, or leg swelling  GASTROINTESTINAL: + lower abdominal pain  GENITOURINARY: + nephrostomy tube   MUSCULOSKELETAL: No joint pain or swelling; No muscle, back, or extremity pain, no upper or lower motor strength weakness, no saddle anesthesia, bowel/bladder incontinence, no falls   < from: CT Abdomen and Pelvis No Cont (06.29.22 @ 10:10) >  ACC: [id number] EXAM:  CT ABDOMEN AND PELVIS                          PROCEDURE DATE:  06/29/2022          INTERPRETATION:  CLINICAL INFORMATION: Right flank pain    COMPARISON: 6/9/2022    CONTRAST/COMPLICATIONS:  IV Contrast: NONE  Oral Contrast: NONE  Complications: None reported at time of study completion    PROCEDURE:  CT of the Abdomen and Pelvis was performed.  Sagittal and coronal reformats were performed.    FINDINGS:  LOWER CHEST: Redemonstration of basilar pulmonary nodules, some of which   are cavitary. Partially imaged cardiac lead.    Please note that evaluation of the abdominal organs and vascular   structures is limited by lack of intravenous contrast.    LIVER: 1.4 cm right hepatic lesion.  BILE DUCTS: Normal caliber.  GALLBLADDER: Within normal limits.  SPLEEN: Within normal limits.  PANCREAS: Within normal limits.  ADRENALS: Within normal limits.  KIDNEYS/URETERS: Right nephrostomy tube. No right hydronephrosis.   Nonobstructive right intrarenal calculus. Chronic severe left   hydronephrosis with cortical thinning and moderate left hydroureter.    BLADDER: Diffuse intraluminal soft tissue.  REPRODUCTIVE ORGANS: Prominent uterus.    BOWEL: No bowel obstruction. Appendix is within normal limits.  PERITONEUM: Mild pelvic fluid.  VESSELS: Within normal limits.  RETROPERITONEUM/LYMPH NODES: Retroperitoneal and pelvic adenopathy.  ABDOMINAL WALL: Postsurgical changes. Mild subcutaneous soft tissue edema.  BONES: Within normal limits.    IMPRESSION:    1. Right nephrostomy tube. No hydronephrosis.  2. Lung base pulmonary nodules, some of which are cavitary, likely   metastatic disease.  3. Right hepatic lesion (1.4 cm), suspicious for metastatic disease.  4. Diffuse intraluminal urinary bladder soft tissue consistent with   history of bladder cancer.    < end of copied text >  NEURO: No headaches, No numbness/tingling b/l LE, No weakness  PSYCHIATRIC: No depression, anxiety, mood swings, or difficulty sleeping    PHYSICAL EXAM:  GENERAL:  Alert & Oriented X3, cachetic   CHEST/LUNG: decreased breath sounds   HEART: Regular rate and rhythm; No murmurs, rubs, or gallops  ABDOMEN + lower abdominal tenderness + right nephrostomy tube  EXTREMITIES:  2+ Peripheral Pulses, No cyanosis, or edema  MUSCULOSKELETAL: + decreased rom   SKIN: No rashes or lesions    Risk factors associated with adverse outcomes related to opioid treatment  [ ]  Concurrent benzodiazepine use  [ ]  History/ Active substance use or alcohol use disorder  [ ] Psychiatric co-morbidity  [ ] Sleep apnea  [ ] COPD  [ ] BMI> 35  [ ] Liver dysfunction  [ ] Renal dysfunction  [ ] CHF  [ ] Smoker  [ ]  Age > 60 years    [x ]  [facility]  Reviewed and Copied to Chart. See below.    Plan of care and goal oriented pain management treatment options were discussed with patient and /or primary care giver; all questions and concerns were addressed and care was aligned with patient's wishes.    Educated patient on goal oriented pain management treatment options     07-05-22 @ 17:03

## 2022-07-05 NOTE — PROGRESS NOTE ADULT - ASSESSMENT
complete note to follow   Assessment and Plan:   · Assessment	  55 yo F from home with PMH of Anemia, asthma, bladder CA (not on Chemo from May'22), R Nephrostomy tube, solitary kidney presenting with hematuria and abdominal pain for a week. endorses similar in the past 2/2 uti. patient noting left sided cp x 2 days. denies n, v. endorses noting fever today. patient had nephrostomy tube changed 1 week ago.     # Bladder CA  # Normocytic Anemia  hematuria in 2019 lead to cystoscopy which was (-) as per pt, PET 2019 (-)  in 2020 pt found to have bladder mass which was Bx and tx'd with three cycles of chemo at [facility] then discontinued d/t pyelonephritis  she then switched Oncologists and now follows with Dr. Hoyt at [facility] [phone], pt states currently on Zapata/Carbo last given in May 2022  chronic UTI's and follows with Urologist Dr. Trinidad [phone]  NT exchange one week ago  CT A/P shows Right NT, basilar pulm nodules likely met dz, Right hepatic lesion 1.4cm susp for met dz and known bladder ca findings  Recs:   -Called again to speak with Dr. Hoyt [phone], left a message for a return call, need to establish if CT here is c/w POD, as well tx plan recs  -unclear if CT is c/w POD, need comparison  -On abx for complicated UTI, ID and Urology following  -Now with vaginal bleeding, not up to date with GYN f/u  -Appreciate GYN consult, plan for pelvic US  -Pain Mgmt following, pain not well controlled with PO tylenol and refusing morphine  -Nutrition on board   -No chemo to be given during admission  -anemia c/w baseline from last months admission  -upon discharge pt to f/u with her primary Oncologist Dr. Hoyt    Thank you for the referral. Will continue to monitor the patient.  Please call with any questions [phone]  Above reviewed with Attending Dr. Jose ARIAS/[facility]  [address] [facility], [address], [place], [place]  [phone]  *Note not finalized until signed by Attending Physician

## 2022-07-05 NOTE — PROGRESS NOTE ADULT - PROBLEM SELECTOR PLAN 1
: Pt with acute right flank pain which is somatic in nature likely due to bladder CA, with likely mets to lung and liver. Pt with right nephrostomy tube.  Education provided to pt regarding the needed for stronger pain meds than acetaminophen.  Gave the option tramadol.  Pt opted for morphine prn.    Opioid pain recommendations   - Morphine 7.5mg PO q5h PRN severe pain. Monitor for sedation/ respiratory depression.  (Reports tolerating morphine in the past)  Non-opioid pain recommendations   - Acetaminophen 325mg PO q 4 hours. Monitor LFTs (per pt request) IV acetaminophen x1 prn if needed   - Lidoderm 4% patch daily.   Bowel Regimen  - Miralax 17G PO daily  - Senna 2 tablets at bedtime for constipation  - consider movantik prn  Mild pain   - Non-pharmacological pain treatment recommendations  - Warm/ Cool packs PRN   - Repositioning, imagery, relaxation, distraction.  - Physical therapy OOB if no contraindications   Recommendations discussed with primary team and RN.

## 2022-07-05 NOTE — PROGRESS NOTE ADULT - SUBJECTIVE AND OBJECTIVE BOX
Patient is seen and examined at the bed side, is afebrile. The events noted regarding Vaginal bleeding.       REVIEW OF SYSTEMS All other review systems are negative      ALLERGIES: No Known Allergies      Vital Signs Last 24 Hrs  T(C): 36.3 (2022 13:01), Max: 36.6 (2022 21:05)  T(F): 97.4 (2022 13:01), Max: 97.8 (2022 21:05)  HR: 70 (:) (53 - 73)  BP: 104/57 (2022 13:) (97/62 - 124/62)  BP(mean): 76 (2022 05:13) (70 - 76)  RR: 18 (:) (18 - 18)  SpO2: 98% (:) (97% - 100%)        PHYSICAL EXAM:  GENERAL: Not in distress   CHEST/LUNG:  Not using accessory muscles   HEART: s1 and s2 present  ABDOMEN:  Nontender and  Nondistended  : Right nephrostomy tube in placed   EXTREMITIES: No pedal  edema  CNS: Awake and Alert      LABS:                          9.8    5.80  )-----------( 220      ( 2022 07:21 )             30.8                           9.8    5.45  )-----------( 232      ( 2022 08:23 )             30.0         07-05    139  |  105  |  19<H>  ----------------------------<  91  4.4   |  27  |  0.92    Ca    9.8      2022 07:21              07-03    138  |  105  |  19<H>  ----------------------------<  84  4.1   |  24  |  0.83    Ca    9.9      2022 08:23      TPro  7.6  /  Alb  3.0<L>  /  TBili  0.3  /  DBili  x   /  AST  13  /  ALT  16  /  AlkPhos  95  06-28    PT/INR - ( 2022 14:43 )   PT: 13.1 sec;   INR: 1.10 ratio      PTT - ( 2022 14:43 )  PTT:28.9 sec      CAPILLARY BLOOD GLUCOSE  POCT Blood Glucose.: 64 mg/dL (2022 12:47)  POCT Blood Glucose.: 88 mg/dL (2022 19:25)  POCT Blood Glucose.: 99 mg/dL (2022 14:28)        Urinalysis Basic - ( 2022 16:34 )  Color: Yellow / Appearance: Slightly Turbid / S.015 / pH: x  Gluc: x / Ketone: Small  / Bili: Negative / Urobili: Negative   Blood: x / Protein: 500 mg/dL / Nitrite: Negative   Leuk Esterase: Small / RBC: 10-25 /HPF / WBC 6-10 /HPF   Sq Epi: x / Non Sq Epi: Moderate /HPF / Bacteria: Moderate /HPF        MEDICATIONS  (STANDING):    acetaminophen     Tablet .. 325 milliGRAM(s) Oral every 4 hours  atorvastatin 40 milliGRAM(s) Oral at bedtime  budesonide  80 MICROgram(s)/formoterol 4.5 MICROgram(s) Inhaler 2 Puff(s) Inhalation two times a day  cefepime   IVPB 1000 milliGRAM(s) IV Intermittent every 8 hours  cefepime   IVPB      cholecalciferol 1000 Unit(s) Oral daily  dextrose 5%. 1000 milliLiter(s) (50 mL/Hr) IV Continuous <Continuous>  dextrose 5%. 1000 milliLiter(s) (100 mL/Hr) IV Continuous <Continuous>  dextrose 50% Injectable 25 Gram(s) IV Push once  dextrose 50% Injectable 12.5 Gram(s) IV Push once  dextrose 50% Injectable 25 Gram(s) IV Push once  glucagon  Injectable 1 milliGRAM(s) IntraMuscular once  insulin lispro (ADMELOG) corrective regimen sliding scale   SubCutaneous three times a day before meals  insulin lispro (ADMELOG) corrective regimen sliding scale   SubCutaneous at bedtime  lidocaine   4% Patch 1 Patch Transdermal daily  OLANZapine 5 milliGRAM(s) Oral daily  polyethylene glycol 3350 17 Gram(s) Oral daily  senna 2 Tablet(s) Oral at bedtime  sodium chloride 0.9%. 1000 milliLiter(s) (60 mL/Hr) IV Continuous <Continuous>      RADIOLOGY & ADDITIONAL TESTS:    22: CT Abdomen and Pelvis No Cont (22 @ 10:10) >  1. Right nephrostomy tube. No hydronephrosis.  2. Lung base pulmonary nodules, some of which are cavitary, likely  metastatic disease.  3. Right hepatic lesion (1.4 cm), suspicious for metastatic disease.  4. Diffuse intraluminal urinary bladder soft tissue consistent with  history of bladder cancer.      22 : Xray Chest 2 Views PA/Lat (22 @ 18:35):  Multiple bilateral pulmonary nodules again seen, for the   most part unchanged, although a 2.4 cm cavitary left lower lobe nodule is  larger, previously 2 cm.        MICROBIOLOGY DATA:    Culture - Urine (22 @ 21:07)   Specimen Source: Clean Catch Clean Catch (Midstream)   Culture Results: <10,000 CFU/mL Normal Urogenital Vandana     COVID-19 PCR . (22 @ 18:11)   COVID-19 PCR: NotDetec               Patient is seen and examined at the bed side, is afebrile. She is tolerating Abx okay.      REVIEW OF SYSTEMS All other review systems are negative      ALLERGIES: No Known Allergies      Vital Signs Last 24 Hrs  T(C): 36.3 (2022 13:01), Max: 36.6 (2022 21:05)  T(F): 97.4 (2022 13:01), Max: 97.8 (2022 21:05)  HR: 70 (:) (53 - 73)  BP: 104/57 (:01) (97/62 - 124/62)  BP(mean): 76 (2022 05:13) (70 - 76)  RR: 18 (:) (18 - 18)  SpO2: 98% (:) (97% - 100%)        PHYSICAL EXAM:  GENERAL: Not in distress   CHEST/LUNG:  Not using accessory muscles   HEART: s1 and s2 present  ABDOMEN:  Nontender and  Nondistended  : Right nephrostomy tube in placed   EXTREMITIES: No pedal  edema  CNS: Awake and Alert      LABS:                          9.8    5.80  )-----------( 220      ( 2022 07:21 )             30.8                           9.8    5.45  )-----------( 232      ( 2022 08:23 )             30.0         07-05    139  |  105  |  19<H>  ----------------------------<  91  4.4   |  27  |  0.92    Ca    9.8      2022 07:21              07-03    138  |  105  |  19<H>  ----------------------------<  84  4.1   |  24  |  0.83    Ca    9.9      2022 08:23      TPro  7.6  /  Alb  3.0<L>  /  TBili  0.3  /  DBili  x   /  AST  13  /  ALT  16  /  AlkPhos  95  06-28    PT/INR - ( 2022 14:43 )   PT: 13.1 sec;   INR: 1.10 ratio      PTT - ( 2022 14:43 )  PTT:28.9 sec      CAPILLARY BLOOD GLUCOSE  POCT Blood Glucose.: 64 mg/dL (2022 12:47)  POCT Blood Glucose.: 88 mg/dL (2022 19:25)  POCT Blood Glucose.: 99 mg/dL (2022 14:28)        Urinalysis Basic - ( 2022 16:34 )  Color: Yellow / Appearance: Slightly Turbid / S.015 / pH: x  Gluc: x / Ketone: Small  / Bili: Negative / Urobili: Negative   Blood: x / Protein: 500 mg/dL / Nitrite: Negative   Leuk Esterase: Small / RBC: 10-25 /HPF / WBC 6-10 /HPF   Sq Epi: x / Non Sq Epi: Moderate /HPF / Bacteria: Moderate /HPF        MEDICATIONS  (STANDING):    acetaminophen     Tablet .. 325 milliGRAM(s) Oral every 4 hours  atorvastatin 40 milliGRAM(s) Oral at bedtime  budesonide  80 MICROgram(s)/formoterol 4.5 MICROgram(s) Inhaler 2 Puff(s) Inhalation two times a day  cefepime   IVPB 1000 milliGRAM(s) IV Intermittent every 8 hours  cefepime   IVPB      cholecalciferol 1000 Unit(s) Oral daily  dextrose 5%. 1000 milliLiter(s) (50 mL/Hr) IV Continuous <Continuous>  dextrose 5%. 1000 milliLiter(s) (100 mL/Hr) IV Continuous <Continuous>  dextrose 50% Injectable 25 Gram(s) IV Push once  dextrose 50% Injectable 12.5 Gram(s) IV Push once  dextrose 50% Injectable 25 Gram(s) IV Push once  glucagon  Injectable 1 milliGRAM(s) IntraMuscular once  insulin lispro (ADMELOG) corrective regimen sliding scale   SubCutaneous three times a day before meals  insulin lispro (ADMELOG) corrective regimen sliding scale   SubCutaneous at bedtime  lidocaine   4% Patch 1 Patch Transdermal daily  OLANZapine 5 milliGRAM(s) Oral daily  polyethylene glycol 3350 17 Gram(s) Oral daily  senna 2 Tablet(s) Oral at bedtime  sodium chloride 0.9%. 1000 milliLiter(s) (60 mL/Hr) IV Continuous <Continuous>      RADIOLOGY & ADDITIONAL TESTS:    22: CT Abdomen and Pelvis No Cont (22 @ 10:10) >  1. Right nephrostomy tube. No hydronephrosis.  2. Lung base pulmonary nodules, some of which are cavitary, likely  metastatic disease.  3. Right hepatic lesion (1.4 cm), suspicious for metastatic disease.  4. Diffuse intraluminal urinary bladder soft tissue consistent with  history of bladder cancer.      22 : Xray Chest 2 Views PA/Lat (22 @ 18:35):  Multiple bilateral pulmonary nodules again seen, for the   most part unchanged, although a 2.4 cm cavitary left lower lobe nodule is  larger, previously 2 cm.        MICROBIOLOGY DATA:    Culture - Urine (22 @ 21:07)   Specimen Source: Clean Catch Clean Catch (Midstream)   Culture Results: <10,000 CFU/mL Normal Urogenital Vandana     COVID-19 PCR . (22 @ 18:11)   COVID-19 PCR: NotDetec

## 2022-07-05 NOTE — PROGRESS NOTE ADULT - ASSESSMENT
Pt is a 55 yo F from home with PMH of Anemia, asthma, bladder CA (not on Chemo from May'22), R Nephrostomy tube, solitary kidney presenting with hematuria and abdominal pain for a week. endorses similar in the past 2/2 uti. patient noting left sided cp x 2 days. In ED trop negative x2, no acute changes on EKG. Had nephrostomy tube recently changed 6/20/22. Presented to ED with c/o right flank pain. Admitted for positive UTI with hematuria which has now resolved. Dr. Rodriguez (ID) following, started on Cefepime 1g daily. Patient continues to c/o Right flank pain, endorses concern for kidney stone .  CT A/P  No hydronephrosis.   Lung base pulmonary nodules, likely metastatic disease, right hepatic lesion (1.4 cm), suspicious for metastatic disease, diffuse intraluminal urinary bladder soft tissue consistent with history of bladder cancer. Hem/onc QMA consulted,    Pt. with persisting right flank pain, urology consulted, note appreciated, no urologic interventions indicated at this time.  Pt. is followed by OP urologist Dr. Trinidad [phone].  Pt. was also seen by hem/onc, note appreciated.  Pt. is followed by OP oncologist Dr. Hoyt at [facility] [phone].  Discharge planning likely back to home when pain is well controlled and cleared by ID, likely 24-48 hrs.  7/1-Pain mgnt consulted, note and reccs appreciated, pt. reports feeling better.  Pt. repeatedly inquires about etiology of her persistent pain, not clear if she understands her illness process.  Pt. agrees to meet with palliative care, consult placed.  As per ID pt. will need total 7 days Abx.  Will likely discharge to home once completed. Hospital course complicated with vaginal bleed with clots. GYN consulted, recommends pelvic ultrasound. F/U on result.

## 2022-07-05 NOTE — PROGRESS NOTE ADULT - SUBJECTIVE AND OBJECTIVE BOX
NP Note discussed with  primary attending    Patient is a 56y old  Female who presents with a chief complaint of UTI and chest pain (05 Jul 2022 12:08)      INTERVAL HPI/OVERNIGHT EVENTS: no new complaints    MEDICATIONS  (STANDING):  acetaminophen     Tablet .. 325 milliGRAM(s) Oral every 4 hours  atorvastatin 40 milliGRAM(s) Oral at bedtime  budesonide  80 MICROgram(s)/formoterol 4.5 MICROgram(s) Inhaler 2 Puff(s) Inhalation two times a day  cefepime   IVPB 1000 milliGRAM(s) IV Intermittent every 8 hours  cefepime   IVPB      cholecalciferol 1000 Unit(s) Oral daily  dextrose 5%. 1000 milliLiter(s) (50 mL/Hr) IV Continuous <Continuous>  dextrose 5%. 1000 milliLiter(s) (100 mL/Hr) IV Continuous <Continuous>  dextrose 50% Injectable 25 Gram(s) IV Push once  dextrose 50% Injectable 12.5 Gram(s) IV Push once  dextrose 50% Injectable 25 Gram(s) IV Push once  glucagon  Injectable 1 milliGRAM(s) IntraMuscular once  insulin lispro (ADMELOG) corrective regimen sliding scale   SubCutaneous three times a day before meals  insulin lispro (ADMELOG) corrective regimen sliding scale   SubCutaneous at bedtime  lidocaine   4% Patch 1 Patch Transdermal daily  OLANZapine 5 milliGRAM(s) Oral daily  polyethylene glycol 3350 17 Gram(s) Oral daily  senna 2 Tablet(s) Oral at bedtime  sodium chloride 0.9%. 1000 milliLiter(s) (60 mL/Hr) IV Continuous <Continuous>    MEDICATIONS  (PRN):  aluminum hydroxide/magnesium hydroxide/simethicone Suspension 30 milliLiter(s) Oral every 6 hours PRN Dyspepsia  dextrose Oral Gel 15 Gram(s) Oral once PRN Blood Glucose LESS THAN 70 milliGRAM(s)/deciliter  morphine  IR 7.5 milliGRAM(s) Oral every 6 hours PRN Severe Pain (7 - 10)  ondansetron    Tablet 4 milliGRAM(s) Oral two times a day PRN Nausea      __________________________________________________  REVIEW OF SYSTEMS:    CONSTITUTIONAL: No fever,   EYES: no acute visual disturbances  NECK: No pain or stiffness  RESPIRATORY: No cough; No shortness of breath  CARDIOVASCULAR: No chest pain, no palpitations  GASTROINTESTINAL: No pain. No nausea or vomiting; No diarrhea   NEUROLOGICAL: No headache or numbness, no tremors  MUSCULOSKELETAL: No joint pain, no muscle pain  GENITOURINARY: no dysuria, no frequency, no hesitancy  PSYCHIATRY: no depression , no anxiety  ALL OTHER  ROS negative        Vital Signs Last 24 Hrs  T(C): 36.3 (05 Jul 2022 05:13), Max: 36.6 (04 Jul 2022 21:05)  T(F): 97.3 (05 Jul 2022 05:13), Max: 97.8 (04 Jul 2022 21:05)  HR: 73 (05 Jul 2022 12:26) (53 - 73)  BP: 124/62 (05 Jul 2022 05:13) (97/62 - 124/62)  BP(mean): 76 (05 Jul 2022 05:13) (70 - 76)  RR: 18 (05 Jul 2022 12:26) (18 - 18)  SpO2: 97% (05 Jul 2022 12:26) (97% - 100%)    ________________________________________________  PHYSICAL EXAM:  GENERAL: NAD  HEENT: Normocephalic;  conjunctivae and sclerae clear; moist mucous membranes;   NECK : supple  CHEST/LUNG: Clear to ausculitation bilaterally with good air entry   HEART: S1 S2  regular; no murmurs, gallops or rubs  ABDOMEN: Soft, Nontender, Nondistended; Bowel sounds present  EXTREMITIES: no cyanosis; no edema; no calf tenderness  SKIN: warm and dry; no rash  NERVOUS SYSTEM:  Awake and alert; Oriented  to place, person and time ; no new deficits    _________________________________________________  LABS:                        9.8    5.80  )-----------( 220      ( 05 Jul 2022 07:21 )             30.8     07-05    139  |  105  |  19<H>  ----------------------------<  91  4.4   |  27  |  0.92    Ca    9.8      05 Jul 2022 07:21          CAPILLARY BLOOD GLUCOSE      POCT Blood Glucose.: 83 mg/dL (05 Jul 2022 11:13)  POCT Blood Glucose.: 69 mg/dL (05 Jul 2022 07:44)  POCT Blood Glucose.: 90 mg/dL (04 Jul 2022 21:29)  POCT Blood Glucose.: 103 mg/dL (04 Jul 2022 16:26)        RADIOLOGY & ADDITIONAL TESTS:  < from: CT Abdomen and Pelvis No Cont (06.29.22 @ 10:10) >  IMPRESSION:    1. Right nephrostomy tube. No hydronephrosis.  2. Lung base pulmonary nodules, some of which are cavitary, likely   metastatic disease.  3. Right hepatic lesion (1.4 cm), suspicious for metastatic disease.  4. Diffuse intraluminal urinary bladder soft tissue consistent with   history of bladder cancer.      < end of copied text >    Imaging Personally Reviewed:  YES/NO    Consultant(s) Notes Reviewed:   YES/ No    Care Discussed with Consultants :     Plan of care was discussed with patient and /or primary care giver; all questions and concerns were addressed and care was aligned with patient's wishes.

## 2022-07-05 NOTE — PROGRESS NOTE ADULT - ASSESSMENT
[facility] called stated they need orders for all 3 PT,OT and Speech and the OT is the speech diagnosis, they need orders placed for OT and PT the PT and OT orders can have the same diagnosis but need 3 separate orders since it is 3 different therap Confidential Drug Utilization Report  Search Terms: Zoë Bell, 1965Search Date: 06/30/2022 15:50:04 PM  The Drug Utilization Report below displays all of the controlled substance prescriptions, if any, that your patient has filled in the last twelve months. The information displayed on this report is compiled from pharmacy submissions to the Department, and accurately reflects the information as submitted by the pharmacies.    This report was requested by: Pat Mora | Reference #: [id number]    There are no results for the search terms that you entered.

## 2022-07-05 NOTE — PROGRESS NOTE ADULT - SUBJECTIVE AND OBJECTIVE BOX
Patient is a 56y old  Female who presents with a chief complaint of UTI and chest pain       SUBJECTIVE / OVERNIGHT EVENTS:      MEDICATIONS  (STANDING):  acetaminophen     Tablet .. 325 milliGRAM(s) Oral every 4 hours  atorvastatin 40 milliGRAM(s) Oral at bedtime  budesonide  80 MICROgram(s)/formoterol 4.5 MICROgram(s) Inhaler 2 Puff(s) Inhalation two times a day  cefepime   IVPB 1000 milliGRAM(s) IV Intermittent every 8 hours  cefepime   IVPB      cholecalciferol 1000 Unit(s) Oral daily  dextrose 5%. 1000 milliLiter(s) (50 mL/Hr) IV Continuous <Continuous>  dextrose 5%. 1000 milliLiter(s) (100 mL/Hr) IV Continuous <Continuous>  dextrose 50% Injectable 25 Gram(s) IV Push once  dextrose 50% Injectable 12.5 Gram(s) IV Push once  dextrose 50% Injectable 25 Gram(s) IV Push once  glucagon  Injectable 1 milliGRAM(s) IntraMuscular once  insulin lispro (ADMELOG) corrective regimen sliding scale   SubCutaneous three times a day before meals  insulin lispro (ADMELOG) corrective regimen sliding scale   SubCutaneous at bedtime  lidocaine   4% Patch 1 Patch Transdermal daily  OLANZapine 5 milliGRAM(s) Oral daily  polyethylene glycol 3350 17 Gram(s) Oral daily  senna 2 Tablet(s) Oral at bedtime  sodium chloride 0.9%. 1000 milliLiter(s) (60 mL/Hr) IV Continuous <Continuous>    MEDICATIONS  (PRN):  aluminum hydroxide/magnesium hydroxide/simethicone Suspension 30 milliLiter(s) Oral every 6 hours PRN Dyspepsia  dextrose Oral Gel 15 Gram(s) Oral once PRN Blood Glucose LESS THAN 70 milliGRAM(s)/deciliter  morphine  IR 7.5 milliGRAM(s) Oral every 6 hours PRN Severe Pain (7 - 10)  ondansetron    Tablet 4 milliGRAM(s) Oral two times a day PRN Nausea        PHYSICAL EXAM:  Vital Signs Last 24 Hrs  T(C): 36.3 (05 Jul 2022 05:13), Max: 37.2 (04 Jul 2022 13:06)  T(F): 97.3 (05 Jul 2022 05:13), Max: 99 (04 Jul 2022 13:06)  HR: 58 (05 Jul 2022 05:13) (53 - 66)  BP: 124/62 (05 Jul 2022 05:13) (97/62 - 124/62)  BP(mean): 76 (05 Jul 2022 05:13) (70 - 76)  RR: 18 (05 Jul 2022 05:13) (18 - 18)  SpO2: 100% (05 Jul 2022 05:13) (100% - 100%)      LABS:                        9.8    5.80  )-----------( 220      ( 05 Jul 2022 07:21 )             30.8     07-05    139  |  105  |  19<H>  ----------------------------<  91  4.4   |  27  |  0.92    Ca    9.8      05 Jul 2022 07:21                COVID-19 PCR: NotDetec (04 Jul 2022 06:00)  COVID-19 PCR: NotDetec (27 Jun 2022 18:11)  SARS-CoV-2: NotDetec (09 Jun 2022 20:56)  COVID-19 PCR: NotDetec (04 May 2022 11:56)  COVID-19 PCR: NotDetec (23 Mar 2022 14:48)  COVID-19 PCR: NotDetec (08 Mar 2022 19:08)  COVID-19 PCR: NotDetec (02 Mar 2022 15:54)  SARS-CoV-2: NotDetec (23 Feb 2022 18:37)  COVID-19 PCR: Detected (21 Jan 2022 07:20)  COVID-19 PCR: Detected (18 Jan 2022 14:34)  COVID-19 PCR: Detected (15 Fabrizio 2022 19:07)           Patient is a 56y old  Female who presents with a chief complaint of UTI and chest pain     SUBJECTIVE / OVERNIGHT EVENTS: events noted. Pt c/o lower pelvic pain and vaginal bleeding with clots, she also states she feels SOB.     MEDICATIONS  (STANDING):  acetaminophen     Tablet .. 325 milliGRAM(s) Oral every 4 hours  atorvastatin 40 milliGRAM(s) Oral at bedtime  budesonide  80 MICROgram(s)/formoterol 4.5 MICROgram(s) Inhaler 2 Puff(s) Inhalation two times a day  cefepime   IVPB 1000 milliGRAM(s) IV Intermittent every 8 hours  cefepime   IVPB      cholecalciferol 1000 Unit(s) Oral daily  dextrose 5%. 1000 milliLiter(s) (50 mL/Hr) IV Continuous <Continuous>  dextrose 5%. 1000 milliLiter(s) (100 mL/Hr) IV Continuous <Continuous>  dextrose 50% Injectable 25 Gram(s) IV Push once  dextrose 50% Injectable 12.5 Gram(s) IV Push once  dextrose 50% Injectable 25 Gram(s) IV Push once  glucagon  Injectable 1 milliGRAM(s) IntraMuscular once  insulin lispro (ADMELOG) corrective regimen sliding scale   SubCutaneous three times a day before meals  insulin lispro (ADMELOG) corrective regimen sliding scale   SubCutaneous at bedtime  lidocaine   4% Patch 1 Patch Transdermal daily  OLANZapine 5 milliGRAM(s) Oral daily  polyethylene glycol 3350 17 Gram(s) Oral daily  senna 2 Tablet(s) Oral at bedtime  sodium chloride 0.9%. 1000 milliLiter(s) (60 mL/Hr) IV Continuous <Continuous>    MEDICATIONS  (PRN):  aluminum hydroxide/magnesium hydroxide/simethicone Suspension 30 milliLiter(s) Oral every 6 hours PRN Dyspepsia  dextrose Oral Gel 15 Gram(s) Oral once PRN Blood Glucose LESS THAN 70 milliGRAM(s)/deciliter  morphine  IR 7.5 milliGRAM(s) Oral every 6 hours PRN Severe Pain (7 - 10)  ondansetron    Tablet 4 milliGRAM(s) Oral two times a day PRN Nausea      PHYSICAL EXAM:  Vital Signs Last 24 Hrs  T(C): 36.3 (05 Jul 2022 05:13), Max: 37.2 (04 Jul 2022 13:06)  T(F): 97.3 (05 Jul 2022 05:13), Max: 99 (04 Jul 2022 13:06)  HR: 58 (05 Jul 2022 05:13) (53 - 66)  BP: 124/62 (05 Jul 2022 05:13) (97/62 - 124/62)  BP(mean): 76 (05 Jul 2022 05:13) (70 - 76)  RR: 18 (05 Jul 2022 05:13) (18 - 18)  SpO2: 100% (05 Jul 2022 05:13) (100% - 100%)    GEN: NAD; A and O x 3, SOB after speaking, cachectic  LUNGS: CTA B/L  HEART: S1 S2  ABDOMEN: soft, lower abdominal tenderness, non-distended, + BS, Right NT in place   EXTREMITIES: no edema  NERVOUS SYSTEM:  Awake and alert; no focal neuro deficits    LABS:                        9.8    5.80  )-----------( 220      ( 05 Jul 2022 07:21 )             30.8     07-05    139  |  105  |  19<H>  ----------------------------<  91  4.4   |  27  |  0.92    Ca    9.8      05 Jul 2022 07:21      COVID-19 PCR: NotDetec (04 Jul 2022 06:00)  COVID-19 PCR: NotDetec (27 Jun 2022 18:11)  SARS-CoV-2: NotDetec (09 Jun 2022 20:56)  COVID-19 PCR: NotDetec (04 May 2022 11:56)  COVID-19 PCR: NotDetec (23 Mar 2022 14:48)  COVID-19 PCR: NotDetec (08 Mar 2022 19:08)  COVID-19 PCR: NotDetec (02 Mar 2022 15:54)  SARS-CoV-2: NotDetec (23 Feb 2022 18:37)  COVID-19 PCR: Detected (21 Jan 2022 07:20)  COVID-19 PCR: Detected (18 Jan 2022 14:34)  COVID-19 PCR: Detected (15 Fabrizio 2022 19:07)

## 2022-07-05 NOTE — PROGRESS NOTE ADULT - PROBLEM SELECTOR PLAN 1
CT A/P shows Lung base pulmonary nodules, some of which are cavitary, likely   metastatic disease.  Right hepatic lesion (1.4 cm), suspicious for metastatic disease.  Diffuse intraluminal urinary bladder soft tissue consistent with history of bladder cancer.  -Hem/Onc QMA consulted, note appreciated  -No chemo to be given during admission  -OP f/u with oncology Dr. Hoyt  ([phone])  -Palliative consulted

## 2022-07-05 NOTE — PROGRESS NOTE ADULT - PROBLEM SELECTOR PLAN 3
- Scant vaginal bleeding with clots  - H & H stable  - pad count  - GYN following, recommends pelvic Sono with no GYN intervention

## 2022-07-06 NOTE — PROGRESS NOTE ADULT - COVID-19 NEGATIVE LAB RESULT
COVID-19 ruled in despite negative lab finding
no vomiting/no diarrhea/no abdominal pain/no hematochezia/no nausea/no melena/no change in bowel habits/no constipation
COVID-19 ruled in despite negative lab finding

## 2022-07-06 NOTE — DISCHARGE NOTE PROVIDER - CARE PROVIDER_API CALL
Renay Trinidad)  Urology  [address] [place] [id number]  Phone: [phone]  Fax: [phone]  Established Patient  Follow Up Time: 1 week    melvin bañuelos  [address] [place] [address]  [id number]  Phone: (   )    -  Fax: (   )    -  Follow Up Time:     Jaelyn Hernandez [place]  [phone]  Phone: (   )    -  Fax: [phone]  Follow Up Time: 1 week

## 2022-07-06 NOTE — PROGRESS NOTE ADULT - ASSESSMENT
Pt is a 55 yo F from home with PMH of Anemia, asthma, bladder CA (not on Chemo from May'22), R Nephrostomy tube, solitary kidney presenting with hematuria and abdominal pain for a week. endorses similar in the past 2/2 uti. patient noting left sided cp x 2 days. In ED trop negative x2, no acute changes on EKG. Had nephrostomy tube recently changed 6/20/22. Presented to ED with c/o right flank pain. Admitted for positive UTI with hematuria which has now resolved. Dr. Rodriguez (ID) following, started on Cefepime 1g daily. Patient continues to c/o Right flank pain, endorses concern for kidney stone .  CT A/P  No hydronephrosis.   Lung base pulmonary nodules, likely metastatic disease, right hepatic lesion (1.4 cm), suspicious for metastatic disease, diffuse intraluminal urinary bladder soft tissue consistent with history of bladder cancer. Hem/onc [facility] consulted,    Pt. with persisting right flank pain, urology consulted, note appreciated, no urologic interventions indicated at this time.  Pt. is followed by OP urologist Dr. Trinidad [phone].  Pt. was also seen by hem/onc, note appreciated.  Pt. is followed by OP oncologist Dr. Hoyt at [place] [phone].  Discharge planning likely back to home when pain is well controlled and cleared by ID, likely 24-48 hrs.  7/1-Pain mgnt consulted, note and reccs appreciated, pt. reports feeling better.  Pt. repeatedly inquires about etiology of her persistent pain, not clear if she understands her illness process.  Pt. agrees to meet with palliative care, consult placed.  As per ID pt. will need total 7 days Abx.  Will likely discharge to home once completed. Hospital course complicated with vaginal bleed with clots. GYN consulted, recommends pelvic ultrasound, reveals No gross free pelvic fluid identified.  Patient to follow up with out/pt GYN, & oncology. Patient is clinically stable for d/c home today. However, patient refused d/c, & refuses to acknowledge 24hour d/c notice given today 7/6.

## 2022-07-06 NOTE — DISCHARGE NOTE PROVIDER - NSDCMRMEDTOKEN_GEN_ALL_CORE_FT
ATORVASTATIN 40 MG TABLET: take 1 tablet by mouth at bedtime  OLANZAPINE 5MG TAB:   ONDANSETRON HCL 4 MG TABLET: take 1 tablet by mouth IN THE MORNING AND 1 TABLET AS NEEDED IN T...  (REFER TO PRESCRIPTION NOTES).  SYMBICORT 80-4.5 MCG INHALER: inhale 2 puffs by mouth twice a day USE WITH SPACER CHAMBER  Vitamin D3 25 mcg (1000 intl units) oral tablet: 1 tab(s) orally once a day  VITAMIN D3 25 MCG TABLET: take 1 tablet by mouth once daily   ATORVASTATIN 40 MG TABLET: each orally once a day (at bedtime)  OLANZAPINE 5MG TAB: tab(s) orally once a day (at bedtime)  ONDANSETRON HCL 4 MG TABLET: each orally every 8 hours, As Needed  SYMBICORT 80-4.5 MCG INHALER: gram(s) inhaled 2 times a day  VITAMIN D3 25 MCG TABLET: each orally once a day

## 2022-07-06 NOTE — PROGRESS NOTE ADULT - SUBJECTIVE AND OBJECTIVE BOX
Patient is a 56y old  Female who presents with a chief complaint of UTI and chest pain       SUBJECTIVE / OVERNIGHT EVENTS:        MEDICATIONS  (STANDING):  acetaminophen     Tablet .. 325 milliGRAM(s) Oral every 4 hours  atorvastatin 40 milliGRAM(s) Oral at bedtime  budesonide  80 MICROgram(s)/formoterol 4.5 MICROgram(s) Inhaler 2 Puff(s) Inhalation two times a day  cholecalciferol 1000 Unit(s) Oral daily  dextrose 5%. 1000 milliLiter(s) (50 mL/Hr) IV Continuous <Continuous>  dextrose 5%. 1000 milliLiter(s) (100 mL/Hr) IV Continuous <Continuous>  dextrose 50% Injectable 25 Gram(s) IV Push once  dextrose 50% Injectable 12.5 Gram(s) IV Push once  dextrose 50% Injectable 25 Gram(s) IV Push once  glucagon  Injectable 1 milliGRAM(s) IntraMuscular once  insulin lispro (ADMELOG) corrective regimen sliding scale   SubCutaneous three times a day before meals  insulin lispro (ADMELOG) corrective regimen sliding scale   SubCutaneous at bedtime  lidocaine   4% Patch 1 Patch Transdermal daily  OLANZapine 5 milliGRAM(s) Oral daily  polyethylene glycol 3350 17 Gram(s) Oral daily  senna 2 Tablet(s) Oral at bedtime  sodium chloride 0.9%. 1000 milliLiter(s) (60 mL/Hr) IV Continuous <Continuous>    MEDICATIONS  (PRN):  acetaminophen   IVPB .. 1000 milliGRAM(s) IV Intermittent once PRN Moderate Pain (4 - 6)  aluminum hydroxide/magnesium hydroxide/simethicone Suspension 30 milliLiter(s) Oral every 6 hours PRN Dyspepsia  dextrose Oral Gel 15 Gram(s) Oral once PRN Blood Glucose LESS THAN 70 milliGRAM(s)/deciliter  morphine  IR 7.5 milliGRAM(s) Oral every 6 hours PRN Severe Pain (7 - 10)  ondansetron    Tablet 4 milliGRAM(s) Oral two times a day PRN Nausea          PHYSICAL EXAM:  Vital Signs Last 24 Hrs  T(C): 36.8 (06 Jul 2022 05:27), Max: 36.8 (06 Jul 2022 05:27)  T(F): 98.2 (06 Jul 2022 05:27), Max: 98.2 (06 Jul 2022 05:27)  HR: 60 (06 Jul 2022 05:27) (56 - 60)  BP: 94/63 (06 Jul 2022 05:27) (94/63 - 105/59)  BP(mean): 69 (06 Jul 2022 05:27) (69 - 69)  RR: 16 (06 Jul 2022 05:27) (16 - 18)  SpO2: 100% (06 Jul 2022 05:27) (100% - 100%)      LABS:                        11.4   6.02  )-----------( 259      ( 06 Jul 2022 07:43 )             36.4     07-06    140  |  103  |  19<H>  ----------------------------<  83  4.2   |  28  |  0.98    Ca    10.8<H>      06 Jul 2022 07:43                COVID-19 PCR: NotDetec (04 Jul 2022 06:00)  COVID-19 PCR: NotDetec (27 Jun 2022 18:11)  SARS-CoV-2: NotDetec (09 Jun 2022 20:56)  COVID-19 PCR: NotDetec (04 May 2022 11:56)  COVID-19 PCR: NotDetec (23 Mar 2022 14:48)  COVID-19 PCR: NotDetec (08 Mar 2022 19:08)  COVID-19 PCR: NotDetec (02 Mar 2022 15:54)  SARS-CoV-2: NotDetec (23 Feb 2022 18:37)  COVID-19 PCR: Detected (21 Jan 2022 07:20)  COVID-19 PCR: Detected (18 Jan 2022 14:34)  COVID-19 PCR: Detected (15 Fabrizio 2022 19:07)           Patient is a 56y old  Female who presents with a chief complaint of UTI and chest pain     SUBJECTIVE / OVERNIGHT EVENTS: events noted. No new complaints. Pain better controlled. Advised pt that I spoke with Dr. Hoyt and she missed her last appt and needs to f/u, she understands. He advised that lung nodules were there prior c/w met bladder ca    MEDICATIONS  (STANDING):  acetaminophen     Tablet .. 325 milliGRAM(s) Oral every 4 hours  atorvastatin 40 milliGRAM(s) Oral at bedtime  budesonide  80 MICROgram(s)/formoterol 4.5 MICROgram(s) Inhaler 2 Puff(s) Inhalation two times a day  cholecalciferol 1000 Unit(s) Oral daily  dextrose 5%. 1000 milliLiter(s) (50 mL/Hr) IV Continuous <Continuous>  dextrose 5%. 1000 milliLiter(s) (100 mL/Hr) IV Continuous <Continuous>  dextrose 50% Injectable 25 Gram(s) IV Push once  dextrose 50% Injectable 12.5 Gram(s) IV Push once  dextrose 50% Injectable 25 Gram(s) IV Push once  glucagon  Injectable 1 milliGRAM(s) IntraMuscular once  insulin lispro (ADMELOG) corrective regimen sliding scale   SubCutaneous three times a day before meals  insulin lispro (ADMELOG) corrective regimen sliding scale   SubCutaneous at bedtime  lidocaine   4% Patch 1 Patch Transdermal daily  OLANZapine 5 milliGRAM(s) Oral daily  polyethylene glycol 3350 17 Gram(s) Oral daily  senna 2 Tablet(s) Oral at bedtime  sodium chloride 0.9%. 1000 milliLiter(s) (60 mL/Hr) IV Continuous <Continuous>    MEDICATIONS  (PRN):  acetaminophen   IVPB .. 1000 milliGRAM(s) IV Intermittent once PRN Moderate Pain (4 - 6)  aluminum hydroxide/magnesium hydroxide/simethicone Suspension 30 milliLiter(s) Oral every 6 hours PRN Dyspepsia  dextrose Oral Gel 15 Gram(s) Oral once PRN Blood Glucose LESS THAN 70 milliGRAM(s)/deciliter  morphine  IR 7.5 milliGRAM(s) Oral every 6 hours PRN Severe Pain (7 - 10)  ondansetron    Tablet 4 milliGRAM(s) Oral two times a day PRN Nausea          PHYSICAL EXAM:  Vital Signs Last 24 Hrs  T(C): 36.8 (06 Jul 2022 05:27), Max: 36.8 (06 Jul 2022 05:27)  T(F): 98.2 (06 Jul 2022 05:27), Max: 98.2 (06 Jul 2022 05:27)  HR: 60 (06 Jul 2022 05:27) (56 - 60)  BP: 94/63 (06 Jul 2022 05:27) (94/63 - 105/59)  BP(mean): 69 (06 Jul 2022 05:27) (69 - 69)  RR: 16 (06 Jul 2022 05:27) (16 - 18)  SpO2: 100% (06 Jul 2022 05:27) (100% - 100%)    GEN: NAD; A and O x 3, lethargic cachectic  LUNGS: CTA B/L  HEART: S1 S2  ABDOMEN: soft, lower abdominal tenderness, non-distended, + BS, Right NT in place   EXTREMITIES: no edema  NERVOUS SYSTEM:  Awake and alert; no focal neuro deficits        LABS:                        11.4   6.02  )-----------( 259      ( 06 Jul 2022 07:43 )             36.4     07-06    140  |  103  |  19<H>  ----------------------------<  83  4.2   |  28  |  0.98    Ca    10.8<H>      06 Jul 2022 07:43      COVID-19 PCR: NotDetec (04 Jul 2022 06:00)  COVID-19 PCR: NotDetec (27 Jun 2022 18:11)  SARS-CoV-2: NotDetec (09 Jun 2022 20:56)  COVID-19 PCR: NotDetec (04 May 2022 11:56)  COVID-19 PCR: NotDetec (23 Mar 2022 14:48)  COVID-19 PCR: NotDetec (08 Mar 2022 19:08)  COVID-19 PCR: NotDetec (02 Mar 2022 15:54)  SARS-CoV-2: NotDetec (23 Feb 2022 18:37)  COVID-19 PCR: Detected (21 Jan 2022 07:20)  COVID-19 PCR: Detected (18 Jan 2022 14:34)  COVID-19 PCR: Detected (15 Fabrizio 2022 19:07)

## 2022-07-06 NOTE — DISCHARGE NOTE PROVIDER - NSDCCPCAREPLAN_GEN_ALL_CORE_FT
PRINCIPAL DISCHARGE DIAGNOSIS  Diagnosis: Acute UTI  Assessment and Plan of Treatment: You were admitted for UTI & treated with IV ABX.   HOME CARE INSTRUCTIONS  f you were prescribed antibiotics, take them exactly as your caregiver instructs you. Finish the medication even if you feel better after you have only taken some of the medication.  Drink enough water and fluids to keep your urine clear or pale yellow.  Avoid caffeine, tea, and carbonated beverages. They tend to irritate your bladder.  Empty your bladder often. Avoid holding urine for long periods of time.  Empty your bladder before and after sexual intercourse.  After a bowel movement, women should cleanse from front to back. Use each tissue only once.  SEEK MEDICAL CARE IF:  You have back pain.  You develop a fever.  Your symptoms do not begin to resolve within 3 days.  SEEK IMMEDIATE MEDICAL CARE IF:  You have severe back pain or lower abdominal pain.  You develop chills.  You have nausea or vomiting.  You have continued burning or discomfort with urination.        SECONDARY DISCHARGE DIAGNOSES  Diagnosis: Metastasis from bladder cancer  Assessment and Plan of Treatment: You have a history of bladder cancer. Continue to follow up with your urologist & oncologist at out/patient.    Diagnosis: Scant vaginal bleeding  Assessment and Plan of Treatment: While in the hospital, you were noted with slant vaginal bleeding. GYN consulted. Vaginal sonogram was done. Follow up with your outpatient GYN and oncologist.    Diagnosis: Chest pain  Assessment and Plan of Treatment:      PRINCIPAL DISCHARGE DIAGNOSIS  Diagnosis: Acute UTI  Assessment and Plan of Treatment: You were admitted for UTI & treated with IV ABX.   HOME CARE INSTRUCTIONS  f you were prescribed antibiotics, take them exactly as your caregiver instructs you.  Drink enough water and fluids to keep your urine clear or pale yellow.  Avoid caffeine, tea, and carbonated beverages. They tend to irritate your bladder.  Empty your bladder often. Avoid holding urine for long periods of time.  Empty your bladder before and after sexual intercourse.  After a bowel movement, women should cleanse from front to back. Use each tissue only once.  SEEK MEDICAL CARE IF:  You have back pain.  You develop a fever.  Your symptoms do not begin to resolve within 3 days.  SEEK IMMEDIATE MEDICAL CARE IF:  You have severe back pain or lower abdominal pain.  You develop chills.  You have nausea or vomiting.  You have continued burning or discomfort with urination.        SECONDARY DISCHARGE DIAGNOSES  Diagnosis: Chest pain  Assessment and Plan of Treatment:     Diagnosis: Metastasis from bladder cancer  Assessment and Plan of Treatment: You have a history of bladder cancer. Continue to follow up with your urologist & oncologist at out/patient.    Diagnosis: Scant vaginal bleeding  Assessment and Plan of Treatment: While in the hospital, you were noted with slant vaginal bleeding. GYN consulted. Vaginal sonogram was done. Follow up with your outpatient GYN and oncologist.     PRINCIPAL DISCHARGE DIAGNOSIS  Diagnosis: Acute UTI  Assessment and Plan of Treatment: You were admitted for UTI & treated with IV ABX Cefepime. you were followed the urology team during your hospitalization, and no urological intervention was needed CT of abdomen was negative for Kidney stones. you were also followed by Infectious disease specialist during your stay. Your Nephrostomy tube was changed while in the hospital on 6/20/22.    please follow up with urologist Dr. Trinidad [phone].  HOME CARE INSTRUCTIONS  Drink enough water and fluids to keep your urine clear or pale yellow.  Avoid caffeine, tea, and carbonated beverages. They tend to irritate your bladder.  Empty your bladder often. Avoid holding urine for long periods of time.  Empty your bladder before and after sexual intercourse.  After a bowel movement, women should cleanse from front to back. Use each tissue only once.  SEEK MEDICAL CARE IF:  You have back pain.  You develop a fever.  Your symptoms do not begin to resolve within 3 days.  SEEK IMMEDIATE MEDICAL CARE IF:  You have severe back pain or lower abdominal pain.  You develop chills.  You have nausea or vomiting.  You have continued burning or discomfort with urination.        SECONDARY DISCHARGE DIAGNOSES  Diagnosis: Metastasis from bladder cancer  Assessment and Plan of Treatment: You have a history of bladder cancer. Continue to follow up with your urologist & oncologist at out/patient.    Please follow up with your oncologist Dr. Hoyt at [place] [phone].    Diagnosis: Scant vaginal bleeding  Assessment and Plan of Treatment: While in the hospital, you were noted with slant vaginal bleeding. GYN consulted. Vaginal sonogram was done, no intervention was needed during your hospitalization. Follow up with your outpatient GYN and oncologist.    Diagnosis: Chest pain  Assessment and Plan of Treatment: you were evaluated for chest pain while in the hospital. your cardiac enzymes, troponins were negative x2 and there were no acute changes on EKG. please follow up with your PCP.

## 2022-07-06 NOTE — PROGRESS NOTE ADULT - SUBJECTIVE AND OBJECTIVE BOX
Patient is seen and examined at the bed side, is afebrile. The events noted regarding unable to perform the US.      REVIEW OF SYSTEMS All other review systems are negative      ALLERGIES: No Known Allergies      Vital Signs Last 24 Hrs  T(C): 36.9 (2022 13:15), Max: 36.9 (2022 13:15)  T(F): 98.4 (2022 13:15), Max: 98.4 (2022 13:15)  HR: 72 (2022 13:15) (56 - 72)  BP: 110/59 (2022 13:15) (94/63 - 110/59)  BP(mean): 72 (2022 13:15) (69 - 72)  RR: 16 (2022 13:15) (16 - 18)  SpO2: 100% (2022 13:15) (100% - 100%)      PHYSICAL EXAM:  GENERAL: Not in distress   CHEST/LUNG:  Not using accessory muscles   HEART: s1 and s2 present  ABDOMEN:  Nontender and  Nondistended  : Right nephrostomy tube in placed   EXTREMITIES: No pedal  edema  CNS: Awake and Alert      LABS:                        11.4   6.02  )-----------( 259      ( 2022 07:43 )             36.4                           9.8    5.80  )-----------( 220      ( 2022 07:21 )             30.8         07-06    140  |  103  |  19<H>  ----------------------------<  83  4.2   |  28  |  0.98    Ca    10.8<H>      2022 07:43      07-05    139  |  105  |  19<H>  ----------------------------<  91  4.4   |  27  |  0.92    Ca    9.8      2022 07:21    TPro  7.6  /  Alb  3.0<L>  /  TBili  0.3  /  DBili  x   /  AST  13  /  ALT  16  /  AlkPhos  95      PT/INR - ( 2022 14:43 )   PT: 13.1 sec;   INR: 1.10 ratio      PTT - ( 2022 14:43 )  PTT:28.9 sec      CAPILLARY BLOOD GLUCOSE  POCT Blood Glucose.: 64 mg/dL (2022 12:47)  POCT Blood Glucose.: 88 mg/dL (2022 19:25)  POCT Blood Glucose.: 99 mg/dL (2022 14:28)        Urinalysis Basic - ( 2022 16:34 )  Color: Yellow / Appearance: Slightly Turbid / S.015 / pH: x  Gluc: x / Ketone: Small  / Bili: Negative / Urobili: Negative   Blood: x / Protein: 500 mg/dL / Nitrite: Negative   Leuk Esterase: Small / RBC: 10-25 /HPF / WBC 6-10 /HPF   Sq Epi: x / Non Sq Epi: Moderate /HPF / Bacteria: Moderate /HPF        MEDICATIONS  (STANDING):    acetaminophen     Tablet .. 325 milliGRAM(s) Oral every 4 hours  atorvastatin 40 milliGRAM(s) Oral at bedtime  budesonide  80 MICROgram(s)/formoterol 4.5 MICROgram(s) Inhaler 2 Puff(s) Inhalation two times a day  cholecalciferol 1000 Unit(s) Oral daily  glucagon  Injectable 1 milliGRAM(s) IntraMuscular once  insulin lispro (ADMELOG) corrective regimen sliding scale   SubCutaneous three times a day before meals  insulin lispro (ADMELOG) corrective regimen sliding scale   SubCutaneous at bedtime  lidocaine   4% Patch 1 Patch Transdermal daily  OLANZapine 5 milliGRAM(s) Oral daily  polyethylene glycol 3350 17 Gram(s) Oral daily  senna 2 Tablet(s) Oral at bedtime  sodium chloride 0.9%. 1000 milliLiter(s) (60 mL/Hr) IV Continuous <Continuous>        RADIOLOGY & ADDITIONAL TESTS:    22: CT Abdomen and Pelvis No Cont (22 @ 10:10) >  1. Right nephrostomy tube. No hydronephrosis.  2. Lung base pulmonary nodules, some of which are cavitary, likely  metastatic disease.  3. Right hepatic lesion (1.4 cm), suspicious for metastatic disease.  4. Diffuse intraluminal urinary bladder soft tissue consistent with  history of bladder cancer.      22 : Xray Chest 2 Views PA/Lat (22 @ 18:35):  Multiple bilateral pulmonary nodules again seen, for the   most part unchanged, although a 2.4 cm cavitary left lower lobe nodule is  larger, previously 2 cm.        MICROBIOLOGY DATA:    Culture - Urine (22 @ 21:07)   Specimen Source: Clean Catch Clean Catch (Midstream)   Culture Results: <10,000 CFU/mL Normal Urogenital Vandana     COVID-19 PCR . (22 @ 18:11)   COVID-19 PCR: NotDetec

## 2022-07-06 NOTE — PROGRESS NOTE ADULT - ASSESSMENT
complete note to follow   	  Assessment and Plan:   · Assessment	  57 yo F from home with PMH of Anemia, asthma, bladder CA (not on Chemo from May'22), R Nephrostomy tube, solitary kidney presenting with hematuria and abdominal pain for a week. endorses similar in the past 2/2 uti. patient noting left sided cp x 2 days. denies n, v. endorses noting fever today. patient had nephrostomy tube changed 1 week ago.     # Met Bladder CA  # Normocytic Anemia  hematuria in 2019 lead to cystoscopy which was (-) as per pt, PET 2019 (-)  in 2020 pt found to have bladder mass which was Bx and tx'd with three cycles of chemo at [facility] then discontinued d/t pyelonephritis  she then switched Oncologists and now follows with Dr. Hoyt at [place] [phone], pt states currently on [place]/Carbo last given in May 2022  chronic UTI's and follows with Urologist Dr. Trinidad [phone]  NT exchange one week ago  CT A/P shows Right NT, basilar pulm nodules likely met dz, Right hepatic lesion 1.4cm susp for met dz and known bladder ca findings  Recs:   -spoke with Dr. Hoyt [phone], pt is non-compliant with f/u, advised that CT with pulm nodules is not new; however the liver lesion is. Pt advised to f/u with him after discharge   -New liver lesion may be POD, pt to f/u with Dr. Hoyt for outpt PET/CT and discuss tx plan  -On abx for complicated UTI, ID and Urology following  -vaginal bleeding eval by Gyn and no inpt w/u needed, f/u as outpt  -Appreciate GYN consult, plan for pelvic US  -Pain Mgmt following, pain better controlled  -Nutrition on board   -No chemo to be given during admission  -anemia c/w baseline from last months admission  -upon discharge pt to f/u with her primary Oncologist Dr. Hoyt    Thank you for the referral. Will continue to monitor the patient.  Please call with any questions [phone]  Above reviewed with Attending Dr. Garcia  [facility]  [address] [facility], [address], [place], [place]  [phone]  *Note not finalized until signed by Attending Physician

## 2022-07-06 NOTE — PROGRESS NOTE ADULT - NUTRITIONAL ASSESSMENT
This patient has been assessed with a concern for Malnutrition and has been determined to have a diagnosis/diagnoses of Severe protein-calorie malnutrition and Underweight (BMI < 19).    This patient is being managed with:   Diet Regular-  Entered: Jun 30 2022 11:41AM    

## 2022-07-06 NOTE — DISCHARGE NOTE PROVIDER - HOSPITAL COURSE
Pt is a 55 yo F from home with PMH of Anemia, asthma, bladder CA (not on Chemo from May'22), R Nephrostomy tube, solitary kidney presenting with hematuria and abdominal pain for a week. endorses similar in the past 2/2 uti. patient noting left sided cp x 2 days. In ED trop negative x2, no acute changes on EKG. Had nephrostomy tube recently changed 6/20/22. Presented to ED with c/o right flank pain. Admitted for positive UTI with hematuria which has now resolved. Dr. Rodriguez (ID) following, started on Cefepime 1g daily. Patient continues to c/o Right flank pain, endorses concern for kidney stone .  CT A/P  No hydronephrosis, Lung base pulmonary nodules, likely metastatic disease, right hepatic lesion (1.4 cm), suspicious for metastatic disease, diffuse intraluminal urinary bladder soft tissue consistent with history of bladder cancer. Hem/onc [facility] consulted,  Pt. continues to have persisting right flank pain, urology consulted, no urologic interventions indicated at this time.  Pt. is followed by out/Pt urologist Dr. Trinidad [phone].  Pt. was also seen by hem/onc,  Pt. is followed by O/P oncologist Dr. Hoyt at [place] [phone].  Pain mgnt consulted,  pt. reports feeling better.   Patient completed 7 days Abx.  Hospital course complicated with vaginal bleed with clots. GYN consulted, recommends pelvic ultrasound, pelvic usd reveals an 8 cm soft tissue mass with central cystic change noted within the central pelvis; with no gross free pelvic fluid. Patient to follow up with out/pt GYN after discharge.  Given patient's improved clinical status and current hemodynamic stability, decision was made to discharge.  Please refer to patient's complete medical chart with documents for a full hospital course, for this is only a brief summary.

## 2022-07-06 NOTE — PROGRESS NOTE ADULT - PROBLEM SELECTOR PROBLEM 6
Chest pain
Severe protein-calorie malnutrition
Chest pain
Bladder cancer
Bladder cancer
Prophylactic measure
no

## 2022-07-06 NOTE — PROGRESS NOTE ADULT - PROBLEM SELECTOR PLAN 9
- Patient came from home  - Possible d/c home today after pelvic sono
- Patient came from home  - Stable for d/c home but patient refused to go home  - Refused to sign 24hour d/c note  - SW following

## 2022-07-06 NOTE — PROGRESS NOTE ADULT - PROBLEM SELECTOR PLAN 6
resolved  negative troponin x2  no acute ischemic changes on EKG   if pt c/o chest pain, perform EKG
hx of bladder cancer  last chemo 5/2022   follows at [facility]  Pain management consulted
hx of bladder cancer  last chemo 5/2022   follows at [facility]  Pain management consulted
pt on SCD for DVt prphylaxis
resolved  negative troponin x2  no acute ischemic changes on EKG   if pt c/o chest pain, perform EKG

## 2022-07-06 NOTE — PROGRESS NOTE ADULT - NS ATTEND AMEND GEN_ALL_CORE FT
# Bladder CA: Out-pt f/u with Dr. Hoyt; discussions as above   -Hold any anti-neoplastic Tx while in-patient   -Supportive care; pain management    -Palliative care f/u   -Vaginal bleed: GYN out-pt f/u   We will sign off. Please call with questions # Metastatic Bladder CA: Out-pt f/u with Dr. Hoyt; discussions as above   -Hold any anti-neoplastic Tx while in-patient   -Supportive care; pain management    -Palliative care f/u   -Vaginal bleed: GYN out-pt f/u   We will sign off. Please call with questions

## 2022-07-06 NOTE — DISCHARGE NOTE PROVIDER - PROVIDER TOKENS
PROVIDER:[TOKEN:[[id number]],FOLLOWUP:[1 week],ESTABLISHEDPATIENT:[T]],FREE:[LAST:[edda],FIRST:[melvin],PHONE:[(   )    -],FAX:[(   )    -],ADDRESS:[[address] [place] [address]],FREE:[LAST:[Dr Hoyt],PHONE:[(   )    -],FAX:[phone]:[[facility]  [phone]],FOLLOWUP:[1 week]]

## 2022-07-06 NOTE — PROGRESS NOTE ADULT - PROVIDER SPECIALTY LIST ADULT
Internal Medicine
Internal Medicine
Cardiology
GYN
Internal Medicine
Pain Medicine
Heme/Onc
Heme/Onc
Infectious Disease
Internal Medicine
Heme/Onc
Infectious Disease
Infectious Disease
Internal Medicine

## 2022-07-06 NOTE — PROGRESS NOTE ADULT - SUBJECTIVE AND OBJECTIVE BOX
gyn f/u  dr pike gyn on call today reviewed the sonogram findings      Transabdominal pelvic sonogram only. Patient declined endovaginal   examination.    FINDINGS:  There is an 8 cm soft tissue mass with central cystic change noted within   the central pelvis; reference should be made to the report of CT   examination performed 6/29/2022. Posterior to the central pelvic mass a   structure measuring 5.6 x 3.8 x 3.5 cm is identified, which may represent   a portion of the uterus (lower uterine segment). The endometrium is not   visualized.    Fluid: No gross free pelvic fluid identified.    < end of copied text >     at this time no acute gyn intervention inpt  pt may follow up with the gyn team at [facility] in [facility]  [address] [facility] [address] floor [place] [address]  tel: [phone]    dw primary team   gyn signed off   follow up gyn as outpt for work up

## 2022-07-06 NOTE — PROGRESS NOTE ADULT - PROBLEM SELECTOR PLAN 1
CT A/P shows Lung base pulmonary nodules, some of which are cavitary, likely   metastatic disease.  Right hepatic lesion (1.4 cm), suspicious for metastatic disease.  Diffuse intraluminal urinary bladder soft tissue consistent with history of bladder cancer.  -Hem/Onc QMA consulted, note appreciated  -No chemo to be given during admission  -OP f/u with oncology Dr. Hoyt  ([phone])  -Palliative consulted

## 2022-07-06 NOTE — DISCHARGE NOTE PROVIDER - CARE PROVIDERS DIRECT ADDRESSES
,[email or web address],DirectAddress_Unknown,DirectAddress_Unknown

## 2022-07-06 NOTE — PROGRESS NOTE ADULT - SUBJECTIVE AND OBJECTIVE BOX
NP Note discussed with  primary attending    Patient is a 56y old  Female who presents with a chief complaint of UTI and chest pain (06 Jul 2022 13:24)      INTERVAL HPI/OVERNIGHT EVENTS: no new complaints    MEDICATIONS  (STANDING):  acetaminophen     Tablet .. 325 milliGRAM(s) Oral every 4 hours  atorvastatin 40 milliGRAM(s) Oral at bedtime  budesonide  80 MICROgram(s)/formoterol 4.5 MICROgram(s) Inhaler 2 Puff(s) Inhalation two times a day  cholecalciferol 1000 Unit(s) Oral daily  dextrose 5%. 1000 milliLiter(s) (50 mL/Hr) IV Continuous <Continuous>  dextrose 5%. 1000 milliLiter(s) (100 mL/Hr) IV Continuous <Continuous>  dextrose 50% Injectable 25 Gram(s) IV Push once  dextrose 50% Injectable 12.5 Gram(s) IV Push once  dextrose 50% Injectable 25 Gram(s) IV Push once  glucagon  Injectable 1 milliGRAM(s) IntraMuscular once  insulin lispro (ADMELOG) corrective regimen sliding scale   SubCutaneous three times a day before meals  insulin lispro (ADMELOG) corrective regimen sliding scale   SubCutaneous at bedtime  lidocaine   4% Patch 1 Patch Transdermal daily  OLANZapine 5 milliGRAM(s) Oral daily  polyethylene glycol 3350 17 Gram(s) Oral daily  senna 2 Tablet(s) Oral at bedtime  sodium chloride 0.9%. 1000 milliLiter(s) (60 mL/Hr) IV Continuous <Continuous>    MEDICATIONS  (PRN):  acetaminophen   IVPB .. 1000 milliGRAM(s) IV Intermittent once PRN Moderate Pain (4 - 6)  aluminum hydroxide/magnesium hydroxide/simethicone Suspension 30 milliLiter(s) Oral every 6 hours PRN Dyspepsia  dextrose Oral Gel 15 Gram(s) Oral once PRN Blood Glucose LESS THAN 70 milliGRAM(s)/deciliter  morphine  IR 7.5 milliGRAM(s) Oral every 6 hours PRN Severe Pain (7 - 10)  ondansetron    Tablet 4 milliGRAM(s) Oral two times a day PRN Nausea      __________________________________________________  REVIEW OF SYSTEMS:    CONSTITUTIONAL: No fever,   EYES: no acute visual disturbances  NECK: No pain or stiffness  RESPIRATORY: No cough; No shortness of breath  CARDIOVASCULAR: No chest pain, no palpitations  GASTROINTESTINAL: No pain. No nausea or vomiting; No diarrhea   NEUROLOGICAL: No headache or numbness, no tremors  MUSCULOSKELETAL: No joint pain, no muscle pain  GENITOURINARY: no dysuria, no frequency, no hesitancy  PSYCHIATRY: no depression , no anxiety  ALL OTHER  ROS negative        Vital Signs Last 24 Hrs  T(C): 36.9 (06 Jul 2022 13:15), Max: 36.9 (06 Jul 2022 13:15)  T(F): 98.4 (06 Jul 2022 13:15), Max: 98.4 (06 Jul 2022 13:15)  HR: 72 (06 Jul 2022 13:15) (56 - 72)  BP: 110/59 (06 Jul 2022 13:15) (94/63 - 110/59)  BP(mean): 72 (06 Jul 2022 13:15) (69 - 72)  RR: 16 (06 Jul 2022 13:15) (16 - 18)  SpO2: 100% (06 Jul 2022 13:15) (100% - 100%)    ________________________________________________  PHYSICAL EXAM:  GENERAL: NAD  HEENT: Normocephalic;  conjunctivae and sclerae clear; moist mucous membranes;   NECK : supple  CHEST/LUNG: Clear to ausculitation bilaterally with good air entry   HEART: S1 S2  regular; no murmurs, gallops or rubs  ABDOMEN: Soft, Nontender, Nondistended; Bowel sounds present  EXTREMITIES: no cyanosis; no edema; no calf tenderness  SKIN: warm and dry; no rash  NERVOUS SYSTEM:  Awake and alert; Oriented  to place, person and time ; no new deficits    _________________________________________________  LABS:                        11.4   6.02  )-----------( 259      ( 06 Jul 2022 07:43 )             36.4     07-06    140  |  103  |  19<H>  ----------------------------<  83  4.2   |  28  |  0.98    Ca    10.8<H>      06 Jul 2022 07:43          CAPILLARY BLOOD GLUCOSE      POCT Blood Glucose.: 83 mg/dL (06 Jul 2022 12:43)  POCT Blood Glucose.: 97 mg/dL (06 Jul 2022 11:16)  POCT Blood Glucose.: 77 mg/dL (06 Jul 2022 07:38)  POCT Blood Glucose.: 117 mg/dL (05 Jul 2022 21:33)        RADIOLOGY & ADDITIONAL TESTS:  < from: US Pelvis Complete (US Pelvis Complete .) (07.05.22 @ 16:13) >  FINDINGS:  There is an 8 cm soft tissue mass with central cystic change noted within   the central pelvis; reference should be made to the report of CT   examination performed 6/29/2022. Posterior to the central pelvic mass a   structure measuring 5.6 x 3.8 x 3.5 cm is identified, which may represent   a portion of the uterus (lower uterine segment). The endometrium is not   visualized.    Fluid: No gross free pelvic fluid identified.    IMPRESSION:  As above.    < end of copied text >    Imaging Personally Reviewed:  YES/NO    Consultant(s) Notes Reviewed:   YES/ No    Care Discussed with Consultants :     Plan of care was discussed with patient and /or primary care giver; all questions and concerns were addressed and care was aligned with patient's wishes.

## 2022-07-06 NOTE — CHART NOTE - NSCHARTNOTEFT_GEN_A_CORE
EVENT: Pt reporting vaginal bleed    BRIEF HPI:  57 yo F from home with PMH of Anemia, asthma, bladder CA (not on Chemo from May'22), R Nephrostomy tube, solitary kidney presenting with hematuria and abdominal pain for a week. endorses similar in the past 2/2 uti. patient noting left sided cp x 2 days. In ED trop negative x2, no acute changes on EKG. Had nephrostomy tube recently changed 6/20/22. Presented to ED with c/o right flank pain. Admitted for positive UTI with hematuria which has now resolved.   Discharge planning likely back to home when pain is well controlled and cleared by ID, likely 24-48 hrs. Now c/o vaginal bleed    Vital Signs Last 24 Hrs  T(C): 36.6 (04 Jul 2022 05:45), Max: 36.7 (03 Jul 2022 14:32)  T(F): 97.8 (04 Jul 2022 05:45), Max: 98.1 (03 Jul 2022 14:32)  HR: 62 (04 Jul 2022 05:45) (56 - 75)  BP: 125/77 (04 Jul 2022 05:45) (104/57 - 125/77)  BP(mean): 69 (03 Jul 2022 20:37) (69 - 69)  RR: 18 (04 Jul 2022 05:45) (18 - 18)  SpO2: 98% (04 Jul 2022 05:45) (98% - 100%)    PLAN:  1. Monitor pad count  2. GYN consult, endorsed for follow up.
Reassessment:     Patient is a 56y old  Female who presents with a chief complaint of UTI and chest pain (2022 13:24)      Factors impacting intake: [ ] none [ ] nausea  [ ] vomiting [ ] diarrhea [ ] constipation  [ ]chewing problems [ ] swallowing issues  [X ] other: UTI, hydronephrosis, bladder cancer, liver cyst, parathyroid tumor    Diet Prescription: Diet, Regular (22 @ 11:41)    Intake: Patient visited, alert bur weak, reports of "mild abdominal pain" & "did not eat much of lunch tray" today, intake 35%, attempted to obtained food preferences, pt. voiced "not feeling well", d/w PCA, encourage po intake w/meal set up, per flow sheet intake %, seen by OB-GYN team & on pain management, rec. c/w diet as ordered & noted pt. declined nutritional supplements per RD initial assessment on 22. RD available.          Daily Weight in k.9 (2022 05:52)    % Weight Change: slight wt. gained noted possible fluid shifts?     Pertinent Medications: MEDICATIONS  (STANDING):  acetaminophen     Tablet .. 325 milliGRAM(s) Oral every 4 hours  atorvastatin 40 milliGRAM(s) Oral at bedtime  budesonide  80 MICROgram(s)/formoterol 4.5 MICROgram(s) Inhaler 2 Puff(s) Inhalation two times a day  cholecalciferol 1000 Unit(s) Oral daily  dextrose 5%. 1000 milliLiter(s) (50 mL/Hr) IV Continuous <Continuous>  dextrose 5%. 1000 milliLiter(s) (100 mL/Hr) IV Continuous <Continuous>  dextrose 50% Injectable 25 Gram(s) IV Push once  dextrose 50% Injectable 12.5 Gram(s) IV Push once  dextrose 50% Injectable 25 Gram(s) IV Push once  glucagon  Injectable 1 milliGRAM(s) IntraMuscular once  insulin lispro (ADMELOG) corrective regimen sliding scale   SubCutaneous three times a day before meals  insulin lispro (ADMELOG) corrective regimen sliding scale   SubCutaneous at bedtime  lidocaine   4% Patch 1 Patch Transdermal daily  OLANZapine 5 milliGRAM(s) Oral daily  polyethylene glycol 3350 17 Gram(s) Oral daily  senna 2 Tablet(s) Oral at bedtime  sodium chloride 0.9%. 1000 milliLiter(s) (60 mL/Hr) IV Continuous <Continuous>    MEDICATIONS  (PRN):  acetaminophen   IVPB .. 1000 milliGRAM(s) IV Intermittent once PRN Moderate Pain (4 - 6)  aluminum hydroxide/magnesium hydroxide/simethicone Suspension 30 milliLiter(s) Oral every 6 hours PRN Dyspepsia  dextrose Oral Gel 15 Gram(s) Oral once PRN Blood Glucose LESS THAN 70 milliGRAM(s)/deciliter  morphine  IR 7.5 milliGRAM(s) Oral every 6 hours PRN Severe Pain (7 - 10)  ondansetron    Tablet 4 milliGRAM(s) Oral two times a day PRN Nausea    Pertinent Labs:  Na140 mmol/L Glu 83 mg/dL K+ 4.2 mmol/L Cr  0.98 mg/dL BUN 19 mg/dL<H>  Chol 195 mg/dL LDL --    HDL 55 mg/dL Trig 110 mg/dL     CAPILLARY BLOOD GLUCOSE      POCT Blood Glucose.: 83 mg/dL (2022 12:43)  POCT Blood Glucose.: 97 mg/dL (2022 11:16)  POCT Blood Glucose.: 77 mg/dL (2022 07:38)  POCT Blood Glucose.: 117 mg/dL (2022 21:33)  POCT Blood Glucose.: 89 mg/dL (2022 16:19)    Skin: intact    Estimated Needs:   [ X] no change since previous assessment  [ ] recalculated:     Previous Nutrition Diagnosis:   [ ] Inadequate Energy Intake [ ]Inadequate Oral Intake [ ] Excessive Energy Intake   [ ] Underweight [ ] Increased Nutrient Needs [ ] Overweight/Obesity   [ ] Altered GI Function [ ] Unintended Weight Loss [ ] Food & Nutrition Related Knowledge Deficit [Severe] Malnutrition     Nutrition Diagnosis is [X ongoing  [ ] resolved [ ] not applicable     New Nutrition Diagnosis: [ ] not applicable     Interventions: To meet nutrition needs     Recommend  [ ] Change Diet To:  [X ] Nutrition Supplement: declined   [ ] Nutrition Support  [X ] Other: Nursing to continue feeding assistance and encouragement, aspiration precaution     Monitoring and Evaluation:   [X ] PO intake [ x ] Tolerance to diet prescription [ x ] weights [ x ] labs[ x ] follow up per protocol  [ ] other:
EVENT: Patient reports vaginal bleeding with clots    OBJECTIVE:  Vital Signs Last 24 Hrs  T(C): 37.2 (04 Jul 2022 13:06), Max: 37.2 (04 Jul 2022 13:06)  T(F): 99 (04 Jul 2022 13:06), Max: 99 (04 Jul 2022 13:06)  HR: 66 (04 Jul 2022 13:06) (56 - 66)  BP: 105/63 (04 Jul 2022 13:06) (104/57 - 125/77)  BP(mean): 69 (03 Jul 2022 20:37) (69 - 69)  RR: 18 (04 Jul 2022 13:06) (18 - 18)  SpO2: 100% (04 Jul 2022 13:06) (98% - 100%)    FOCUSED PHYSICAL EXAM:  Neuro: awake, alert, oriented x 3. No neuro deficit  Cardiovascular: Pulses +2 B/L in lower and upper extremities, HR regular, BP stable, No edema.  Respiratory: Respirations regular, unlabored, breath sounds clear B/L.   GI: Abdomen soft, non-tender, positive bowel sounds.  : Vaginal bleed with clots, +R nephrostomy tube, no bladder distention noted.         07-03-22 @ 07:01  -  07-04-22 @ 07:00  --------------------------------------------------------  IN: 0 mL / OUT: 300 mL / NET: -300 mL        LABS:                        9.8    5.45  )-----------( 232      ( 03 Jul 2022 08:23 )             30.0     07-03    138  |  105  |  19<H>  ----------------------------<  84  4.1   |  24  |  0.83    Ca    9.9      03 Jul 2022 08:23            ASSESSMENT:  A/P  Pt is a 55 yo F from home with PMH of Anemia, asthma, bladder CA (not on Chemo from May'22), R Nephrostomy tube, solitary kidney presenting with hematuria and abdominal pain for a week. endorses similar in the past 2/2 uti.     PROBLEM: Vaginal bleed with clots.    PLAN:   1. GYN consulted  2. Monitor pad counts    FOLLOW UP / RESULT:   - F/U GYN recs

## 2022-07-06 NOTE — PROGRESS NOTE ADULT - PROBLEM SELECTOR PLAN 3
- Scant vaginal bleeding with clots  - H & H stable  - pad count  - GYN following, recommends pelvic Sono, shows no gross free pelvic fluid  -  Recs f/u out/pt GYN

## 2022-07-06 NOTE — PROGRESS NOTE ADULT - REASON FOR ADMISSION
UTI and chest pain

## 2022-07-06 NOTE — PROGRESS NOTE ADULT - ASSESSMENT
Patient is a 56y old  Female with PMH of Anemia, asthma, bladder CA (not on Chemo from May'22), Right Nephrostomy tube,  changed 1 week ago. now presents to the ER for evaluation of hematuria and abdominal pain for a week.  On admission, she found to have no fever, BUT positive Urine analysis. She has started on Ceftriaxone and the ID consult requested to assist with further evaluation and antibiotic management.     # Complicated UTI - in the setting of Right nephrostomy tube- s/p changed in 6/20- Urine culture has no growth.   # H/O recent Enterobacter UTI- 6/10/22  #  Bladder CA (not on Chemo from May'22)  # Vaginal bleeding    would recommend:    1. Monitor OFF ABx as she completed the course  2. Pain management as needed  3. Management of Bladder cancer as per Hem/Onc  4. Monitor H/H and transfuse as needed    Attending Attestation:    Spent more than 35 minutes on total encounter, more than 50 % of the visit was spent counseling and/or coordinating care by the Attending physician.

## 2022-07-06 NOTE — DISCHARGE NOTE PROVIDER - DETAILS OF MALNUTRITION DIAGNOSIS/DIAGNOSES
This patient has been assessed with a concern for Malnutrition and was treated during this hospitalization for the following Nutrition diagnosis/diagnoses:     -  07/01/2022: Severe protein-calorie malnutrition   -  07/01/2022: Underweight (BMI < 19)

## 2022-07-06 NOTE — PROGRESS NOTE ADULT - PROBLEM SELECTOR PLAN 5
right flank pain refractory to Tylenol  -Pain mgnt consulted  -Note and reccs appreciated  -Supportive care
resolved  negative troponin x2  no acute ischemic changes on EKG   if pt c/o chest pain, perform EKG
hx of bladder cancer  last chemo 5/2022   follows at [facility]
resolved  negative troponin x2  no acute ischemic changes on EKG   if pt c/o chest pain, perform EKG
right flank pain refractory to Tylenol  -Pain mgnt consulted  -Note and reccs appreciated  -Supportive care

## 2022-07-07 NOTE — DISCHARGE NOTE NURSING/CASE MANAGEMENT/SOCIAL WORK - PATIENT PORTAL LINK FT
You can access the FollowMyHealth Patient Portal offered by [facility] by registering at the following website: [email or web address]. By joining [email or web address] [email or web address] portal, you will also be able to view your health information using other applications (apps) compatible with our system.

## 2022-07-07 NOTE — DISCHARGE NOTE NURSING/CASE MANAGEMENT/SOCIAL WORK - NSDCPEFALRISK_GEN_ALL_CORE
For information on Fall & Injury Prevention, visit: [email or web address] OR  [email or web address] OR  https://www.cdc.gov/steadi/patient.html

## 2022-07-18 PROBLEM — Z87.442 PERSONAL HISTORY OF URINARY CALCULI: Chronic | Status: ACTIVE | Noted: 2022-01-01

## 2022-07-18 NOTE — ED PROVIDER NOTE - PATIENT PORTAL LINK FT
You can access the FollowMyHealth Patient Portal offered by [facility] by registering at the following website: [email or web address]. By joining [employer]’s FollowMyHealth portal, you will also be able to view your health information using other applications (apps) compatible with our system.

## 2022-07-18 NOTE — ED PROVIDER NOTE - CLINICAL SUMMARY MEDICAL DECISION MAKING FREE TEXT BOX
Pt is eager to be discharged. Requested rx for Cefpodoxime states this antibiotic does not give her diarrhea.  Pt will f/u with her urologist.   Pt is well appearing, has no new complaints and able to walk with normal gait. Pt is stable for discharge and follow up with medical doctor. Pt educated on care and need for follow up. Discussed anticipatory guidance and return precautions. Questions answered. I had a detailed discussion with the patient and/or guardian regarding the historical points, exam findings, and any diagnostic results supporting the discharge diagnosis.

## 2022-07-18 NOTE — ED PROVIDER NOTE - CARE PROVIDER_API CALL
Renay Trinidad)  Urology  [address] [place] [address]  Phone: [phone]  Fax: [phone]  Follow Up Time:

## 2022-07-18 NOTE — ED PROVIDER NOTE - CARE PLAN
1 Principal Discharge DX:	Diarrhea  Secondary Diagnosis:	UTI (urinary tract infection)  Secondary Diagnosis:	Chest pain

## 2022-07-18 NOTE — ED ADULT NURSE NOTE - DOES PATIENT HAVE ADVANCE DIRECTIVE
Well controlled, continue lisinopril. Encouraged patient to continue to monitor blood pressure at home and to call or return to clinic with consistent elevations greater than 130/80. Labs in clinic today to include CBC, CMP.     No

## 2022-07-18 NOTE — ED PROVIDER NOTE - OBJECTIVE STATEMENT
pt with multiple complaints, States smelled sulfa order in tap water last night and developed subsequent diarrhea.   pt with hx of bladder cancer an indwelling nephrotomy tube. requesting UA to r/o UTI.  pt also states developed chest pain last  night associated with generalized weakness.   No fever, no coughing, no shortness of breath. Pt with multiple complaints, States smelled sulfa order in tap water last night and developed subsequent diarrhea.   Pt with hx of bladder cancer an indwelling nephrotomy tube. requesting UA to r/o UTI.  Pt also states developed chest pain last  night associated with generalized weakness.  No fever, no coughing, no shortness of breath.

## 2022-07-18 NOTE — ED PROVIDER NOTE - NSFOLLOWUPINSTRUCTIONS_ED_ALL_ED_FT
Return if you have fever, chest pain, difficulty producing urine any concerns.   See your doctor as soon as possible (within 1-2 days).   If you need further assistance for appointments you can contact the [facility] Care Coordinator at [phone] or the [facility] Patient Access Services helpline at [phone] to find names/contact #s for a practitioner to follow up with.  Bring your discharge papers / test results with you to any follow up appointments.   In addition our outpatient [facility] is located at [address] [place] [address], tel: [phone].

## 2022-07-18 NOTE — ED PROVIDER NOTE - NSICDXPASTMEDICALHX_GEN_ALL_CORE_FT
PAST MEDICAL HISTORY:  Anemia     Asthma     Bladder cancer     Bradycardia     History of renal calculi     HLD (hyperlipidemia)     HTN (hypertension)     Hydronephrosis has right Nephrostomy tube - dressing intact    Liver cyst     Pacemaker [facility]    Parathyroid tumor

## 2022-07-31 NOTE — ED PROVIDER NOTE - CLINICAL SUMMARY MEDICAL DECISION MAKING FREE TEXT BOX
Moose-: 57 yo F with PMH of Anemia, asthma, bladder CA (not on Chemo from May'22), R Nephrostomy tube, solitary kidney presents with lower abdominal pain x 1 week, occasional burning, vaginal bleeding since yesterday 1-2 pads per day, noted large clot this morning. Also intermittent left sided CP and shortness of breath, fatigue, stated looks paler than usual. Denies any fevers, vomiting, bloody stools, black tarry stools, headache, vision change, numbness, weakness, or rash. Denies any aspirin or AC use. Pt declining pelvic/transvaginal ultrasound. Abdomen tender over suprapubic region, suspect UTI vs. progression of malignancy vs. symptomatic anemia. Unclear etiology of CP, had similar pain during prior visit with neg workup. [place]-: 57 yo F with PMH of Anemia, asthma, bladder CA (not on Chemo from May'22), R Nephrostomy tube, solitary kidney presents with lower abdominal pain x 1 week, occasional burning, vaginal bleeding since yesterday 1-2 pads per day, noted large clot this morning. Also intermittent left sided CP and shortness of breath, fatigue, stated looks paler than usual. Denies any fevers, vomiting, bloody stools, black tarry stools, headache, vision change, numbness, weakness, or rash. Denies any aspirin or AC use. Pt declining pelvic/transvaginal ultrasound. Abdomen tender over suprapubic region, suspect UTI vs. progression of malignancy vs. symptomatic anemia. Unclear etiology of CP, had similar pain during prior visit with neg workup. Will obtain labs, imaging, supportive treatment with dispo pending workup.

## 2022-07-31 NOTE — ED PROVIDER NOTE - PHYSICAL EXAMINATION
CONSTITUTIONAL: non-toxic, NAD  SKIN: no rash, no petechiae.  EYES: pink conjunctiva, anicteric  ENT: tongue and uvular midline, no exudates, mildly dry mucous membranes  NECK: Supple; no meningismus, no JVD  CARD: RRR, no murmurs, equal radial pulses bilaterally 2+  RESP: CTAB, no respiratory distress  ABD: Soft, tender over suprapubic region, non-distended, no peritoneal signs  EXT: Normal ROM x4. No edema.   NEURO: Alert, oriented. Neuro exam nonfocal.  PSYCH: Cooperative, appropriate.

## 2022-07-31 NOTE — ED PROVIDER NOTE - PROGRESS NOTE DETAILS
Lucks-DO: pt declining contrast due to solitary kidney. CT non-con obtained showing progression of disease. Will treat for complicated UTI, discussed with Dr. Ralph, accepted for admission. Verbal signout given to MAR.

## 2022-07-31 NOTE — ED PROVIDER NOTE - OBJECTIVE STATEMENT
57 yo F with PMH of Anemia, asthma, bladder CA (not on Chemo from May'22), R Nephrostomy tube, solitary kidney presents with lower abdominal pain x 1 week, occasional burning, vaginal bleeding since yesterday 1-2 pads per day, noted large clot this morning. Also intermittent left sided CP and shortness of breath, fatigue, stated looks paler than usual. Denies any fevers, vomiting, bloody stools, black tarry stools, headache, vision change, numbness, weakness, or rash. Denies any aspirin or AC use. Denies any additional complaints.

## 2022-07-31 NOTE — ED ADULT NURSE NOTE - OBJECTIVE STATEMENT
pt is here c/o chest pain , intermittent since last night , pt is on chemotherapy , left sided pacemaker , right sided nephrostomy tube

## 2022-08-01 NOTE — H&P ADULT - PROBLEM SELECTOR PLAN 3
comes in with b/L flank pain   c/o passing large clots of blood from the vagina     CTabdomen: BLADDER/REPRODUCTIVE ORGANS: Marked mural thickening of the urinary   bladder which is inseparable from a soft tissue density which may   represent combination of uterus and additional neoplasm.    -GYN consulted

## 2022-08-01 NOTE — CONSULT NOTE ADULT - ASSESSMENT
This is a 56 year old woman with bladder cancer. She appears to have developed hematuria in 2019 with a negative subsequent cystoscopy and PET/CT per patient previously. Then in 2020, she was found to have a bladder mass which was biopsied and initially treated with chemotherapy at [facility] then discontinued due to pyelonephritis. She switched oncologist and currently follows with Dr Hoyt at [facility]. She was most recently on carboplatin/gemcitabine, with last dose given in May 2022 (unable to get treatment due to recurrent hospitalizations since).     # Bladder cancer  - Unclear initial stage, appears to be metastatic  - Based on most recent CT scan from 7/31, consistent with progression of disease  - She continues to follow with Dr Hoyt at [facility] (was supposed to see him today)  - Records/additional information from her primary oncologist will be helpful to complete assessment  - No plan to treat with chemotherapy during this admission  - Discussed potential next step and treatment options, which include further systemic therapy and immunotherapy  - If feasible, core needle biopsy of an accessible metastatic lesion (such as pulmonary or hepatic) for NGS testing may help determine treatment options  - To follow up with primary oncologist Dr Hoyt upon discharge

## 2022-08-01 NOTE — CONSULT NOTE ADULT - ATTENDING COMMENTS
Pt seen and examined. Agree with note as written above.    Pt with metastatic bladder cancer, right nephrostomy in place draining yellow urine. Pain likely secondary to metastatic disease.    - Plan as above

## 2022-08-01 NOTE — CONSULT NOTE ADULT - ASSESSMENT
Patient is a 56y old  Female with PMH of Anemia, Asthma, Bladder cancer (previously on chemo until 5/2022) with  Dr. Renay Trinidad, Right nephrostomy tube, hyperparathyroidism, bradycardia with PPM, recent UTI admission 7/18, now presents to the ER for evaluation of 3 days hx of gradual onset, worsening, constant 8/10 intermittent cramping B/l flank pain alleviated by tylenol worse with urination surrounding a clogged- non flushing nephrostomy tube. Patient also reports intermittent chronic hematuria, however today she reports large clots from the vagina. On admission, she has no fever, no leukocytosis and Urine analysis is negative.  The CT abdomen and pelvis and CT chest show progression of metastatic disease and soft tissue density in the pelvis. Also shows retroperitoneal LAD. The ID consult requested to assist with evaluation of UTI.     # UTI- in the setting of Right nephrostomy tube  # Post-menopausal bleeding  # Metastatic Bladder cancer    would recommend:    1. Monitor OFF Abx as urine  analysis is negative  2. Management of post-menopausal bleeding as per GYN  3. Pain management as needed  4. Management of Metastatic Bladder cancer as per Hem/Onc    d/w patient and Dr. Ochoa     will follow the patient with you and make further recommendation based on the clinical course and Lab results  Thank you for the opportunity to participate in Ms. HOUSE's care    Attending Attestation:    Spent more than 65 minutes on total encounter, more than 50 % of the visit was spent counseling and/or coordinating care by the Attending physician.

## 2022-08-01 NOTE — CONSULT NOTE ADULT - ASSESSMENT
56F with PMH of Anemia, Asthma, metastatic bladder cancer (previously on chemo until 5/2022) with  Dr. Renay Trinidad, R nephrostomy tube, Nonfunctioning L kidney, hyperparathyroidism, bradycardia with PPM, recent UTI ED visit 7/18, recently discharged, who presents with bilateral flank pain and R nephrostomy tube with urine leakage around tube site and inability to flush freely.     Plan  - f/u urine culture, antibiotics per primary team  - Consult IR for R nephrostomy tube check in the setting of possible blockage  - Trend H/H, Cr  - Monitor gross hematuria/ retention  - Plan incomplete, pending discussion with attending 56F with PMH of Anemia, Asthma, metastatic bladder cancer (previously on chemo until 5/2022) with  Dr. Renay Trinidad, R nephrostomy tube, Nonfunctioning L kidney, hyperparathyroidism, bradycardia with PPM, recent UTI ED visit 7/18, recently discharged, who presents with bilateral flank pain    Plan  - f/u urine culture, antibiotics per primary team  - Trend H/H, Cr  - Monitor gross hematuria/ retention  - Plan incomplete, pending discussion with attending 56F with PMH of Anemia, Asthma, metastatic bladder cancer (previously on chemo until 5/2022) with  Dr. Renay Trinidad, R nephrostomy tube, Nonfunctioning L kidney, hyperparathyroidism, bradycardia with PPM, recent UTI ED visit 7/18, recently discharged, who presents with bilateral flank pain. Given that the patient's R nephrostomy tube has been flushed and is having good urine output and the CT shows no R. sided hydronephrosis with curl in the renal pelvis, it is likely that the R nephrostomy tube is in place.     Plan  - No acute urological intervention at this time.  - Consider chronic pain consult for management of malignancy related pain, if not well controlled  - Continue to f/u Heme onc recommendations for further work up of suspected metastatic bladder cancer and possible systemic chemo/immunotherapy.   - f/u urine culture, antibiotics per primary team  - Trend H/H, Cr  - Monitor gross hematuria/ retention  - Follow up with Dr. Trinidad, Urologist for outpatient management of bladder cancer. Office phone number to be included in discharge paperwork: [phone]  - Plan discussed with Dr. Cheli Lyon.

## 2022-08-01 NOTE — CONSULT NOTE ADULT - SUBJECTIVE AND OBJECTIVE BOX
Patient is a 56y old  Female who presents with a chief complaint of Abdominal pain (01 Aug 2022 11:28)        REVIEW OF SYSTEMS: Total of twelve systems have been reviewed with patient and found to be negative unless mentioned in HPI      PAST MEDICAL & SURGICAL HISTORY:  Anemia  Asthma  HLD (hyperlipidemia)  Pacemaker  [employer]  Bladder cancer  HTN (hypertension)  Parathyroid tumor  Liver cyst  Hydronephrosis  has right Nephrostomy tube - dressing intact  Bradycardia  History of renal calculi  H/O myomectomy  Presence of cardiac pacemaker  H/O hydronephrosis  s/p Right Perc nephrostomy tube placement 2021, exchange 2021        SOCIAL HISTORY  Alcohol: Does not drink  Tobacco: Does not smoke  Illicit substance use: None      FAMILY HISTORY: Non contributory to the present illness        ALLERGIES: No Known Allergies        Vital Signs Last 24 Hrs  T(C): 36.5 (01 Aug 2022 08:16), Max: 37.2 (01 Aug 2022 04:10)  T(F): 97.7 (01 Aug 2022 08:16), Max: 99 (01 Aug 2022 04:10)  HR: 65 (01 Aug 2022 08:16) (59 - 77)  BP: 126/74 (01 Aug 2022 08:16) (105/62 - 126/74)  BP(mean): --  RR: 16 (01 Aug 2022 08:16) (16 - 19)  SpO2: 99% (01 Aug 2022 08:16) (99% - 100%)    Parameters below as of 01 Aug 2022 08:16  Patient On (Oxygen Delivery Method): room air        PHYSICAL EXAM:  GENERAL: Not in distress   CHEST/LUNG:  Aire ntry bilaterally  HEART: s1 and s2 present  ABDOMEN:  Nontender and  Nondistended  EXTREMITIES: No pedal  edema  CNS: Awake and Alert      LABS:                        10.1   7.14  )-----------( 316      ( 01 Aug 2022 06:35 )             32.6       08-01    142  |  109<H>  |  16  ----------------------------<  79  4.3   |  26  |  1.09    Ca    10.4      01 Aug 2022 06:35  Phos  3.2     08-01  Mg     2.4     08-01    TPro  7.4  /  Alb  2.6<L>  /  TBili  0.2  /  DBili  x   /  AST  11  /  ALT  13  /  AlkPhos  95  08-01    PT/INR - ( 2022 10:35 )   PT: 13.6 sec;   INR: 1.14 ratio      PTT - ( 2022 10:35 )  PTT:32.5 sec        Urinalysis Basic - ( 01 Aug 2022 07:34 )  Color: Yellow / Appearance: Slightly Turbid / S.015 / pH: x  Gluc: x / Ketone: Negative  / Bili: Negative / Urobili: Negative   Blood: x / Protein: 500 mg/dL / Nitrite: Negative   Leuk Esterase: Small / RBC: 25-50 /HPF / WBC 3-5 /HPF   Sq Epi: x / Non Sq Epi: Few /HPF / Bacteria: Few /HPF        MEDICATIONS  (STANDING):  heparin   Injectable 5000 Unit(s) SubCutaneous every 12 hours  sodium chloride 0.9%. 1000 milliLiter(s) (80 mL/Hr) IV Continuous <Continuous>    MEDICATIONS  (PRN):  acetaminophen     Tablet .. 325 milliGRAM(s) Oral every 4 hours PRN Temp greater or equal to 38C (100.4F), Mild Pain (1 - 3)  melatonin 3 milliGRAM(s) Oral at bedtime PRN Insomnia          RADIOLOGY & ADDITIONAL TESTS:               Patient is a 56y old  Female with PMH of Anemia, Asthma, Bladder cancer (previously on chemo until 2022) with  Dr. Renay Trinidad, Right nephrostomy tube, hyperparathyroidism, bradycardia with PPM, recent UTI admission , now presents to the ER for evaluation of 3 days hx of gradual onset, worsening, constant 8/10 intermittent cramping B/l flank pain alleviated by tylenol worse with urination surrounding a clogged- non flushing nephrostomy tube. Patient also reports intermittent chronic hematuria, however today she reports large clots from the vagina. On admission, she has no fever, no leukocytosis and Urine analysis is negative.  The CT abdomen and pelvis and CT chest show progression of metastatic disease and soft tissue density in the pelvis. Also shows retroperitoneal LAD. The ID consult requested to assist with evaluation of UTI.       REVIEW OF SYSTEMS: Total of twelve systems have been reviewed with patient and found to be negative unless mentioned in HPI      PAST MEDICAL & SURGICAL HISTORY:  Anemia  Asthma  HLD (hyperlipidemia)  Bradycardia, Pacemaker, [employer]  Bladder cancer  HTN (hypertension)  Parathyroid tumor  Liver cyst  Hydronephrosis, has right Nephrostomy tube - dressing intact  History of renal calculi  H/O myomectomy  Presence of cardiac pacemaker  H/O hydronephrosis  s/p Right Perc nephrostomy tube placement 2021, exchange 2021      SOCIAL HISTORY  Alcohol: Does not drink  Tobacco: Does not smoke  Illicit substance use: None      FAMILY HISTORY: Non contributory to the present illness      ALLERGIES: No Known Allergies        Vital Signs Last 24 Hrs  T(C): 36.5 (01 Aug 2022 08:16), Max: 37.2 (01 Aug 2022 04:10)  T(F): 97.7 (01 Aug 2022 08:16), Max: 99 (01 Aug 2022 04:10)  HR: 65 (01 Aug 2022 08:16) (59 - 77)  BP: 126/74 (01 Aug 2022 08:16) (105/62 - 126/74)  BP(mean): --  RR: 16 (01 Aug 2022 08:16) (16 - 19)  SpO2: 99% (01 Aug 2022 08:16) (99% - 100%)    Parameters below as of 01 Aug 2022 08:16  Patient On (Oxygen Delivery Method): room air        PHYSICAL EXAM:  GENERAL: Not in distress   CHEST/LUNG:  Not using accessory muscles   HEART: s1 and s2 present  ABDOMEN:  Nontender and  Nondistended  : Right nephrostomy tube in placed and draining clear urine  EXTREMITIES: No pedal  edema  CNS: Awake and Alert      LABS:                        10.1   7.14  )-----------( 316      ( 01 Aug 2022 06:35 )             32.6       08-01    142  |  109<H>  |  16  ----------------------------<  79  4.3   |  26  |  1.09    Ca    10.4      01 Aug 2022 06:35  Phos  3.2     08-  Mg     2.4     08-    TPro  7.4  /  Alb  2.6<L>  /  TBili  0.2  /  DBili  x   /  AST  11  /  ALT  13  /  AlkPhos  95  08-    PT/INR - ( 2022 10:35 )   PT: 13.6 sec;   INR: 1.14 ratio      PTT - ( 2022 10:35 )  PTT:32.5 sec        Urinalysis Basic - ( 01 Aug 2022 07:34 )  Color: Yellow / Appearance: Slightly Turbid / S.015 / pH: x  Gluc: x / Ketone: Negative  / Bili: Negative / Urobili: Negative   Blood: x / Protein: 500 mg/dL / Nitrite: Negative   Leuk Esterase: Small / RBC: 25-50 /HPF / WBC 3-5 /HPF   Sq Epi: x / Non Sq Epi: Few /HPF / Bacteria: Few /HPF        MEDICATIONS  (STANDING):  heparin   Injectable 5000 Unit(s) SubCutaneous every 12 hours  sodium chloride 0.9%. 1000 milliLiter(s) (80 mL/Hr) IV Continuous <Continuous>    MEDICATIONS  (PRN):  acetaminophen     Tablet .. 325 milliGRAM(s) Oral every 4 hours PRN Temp greater or equal to 38C (100.4F), Mild Pain (1 - 3)  melatonin 3 milliGRAM(s) Oral at bedtime PRN Insomnia        RADIOLOGY & ADDITIONAL TESTS:    22: CT Abdomen and Pelvis No Cont (22 @ 14:38) Interval progression of metastatic disease in the chest.  Interval progression of right hepatic lesion. Appearance of the urinary bladder is without significant change and is   likely from neoplasm. Urinary bladder is also inseparable from another  soft tissue density in the pelvis which may represent combination of   uterus or additional disease. Retroperitoneal lymphadenopathy is without significant change.      22: CT Chest No Cont (22 @ 14:37) LUNGS AND LARGE AIRWAYS: Patent central airways. Multiple bilateral   pulmonary nodules, a representative nodule in the left lower lobe  measures 2.5 x 1.9 cm. A few of the nodules also demonstrate cavitation.   Overall interval increase since recent CT of 2022.      MICROBIOLOGY DATA:    Culture - Urine (22 @ 12:16)   Specimen Source: Clean Catch Clean Catch (Midstream)   Culture Results: No growth     Respiratory Viral Panel with COVID-19 by PERLITA (22 @ 10:35)   Rapid RVP Result: NotDetec   SARS-CoV-2: NotDetec:

## 2022-08-01 NOTE — PATIENT PROFILE ADULT - FALL HARM RISK - RISK INTERVENTIONS

## 2022-08-01 NOTE — CONSULT NOTE ADULT - SUBJECTIVE AND OBJECTIVE BOX
55yo female  with PMH of Anemia, Asthma, Bladder cancer (previously on chemo until 2022),, R nephrostomy tube, hyperparathyroidism, bradycardia with PPM, recent UTI admission  and , admitted with complaints of gradual onset, worsening, constant 8/10 intermittent cramping B/l flank pain.   GYN consulted for an episode of vaginal bleeding, passed a "fist sized clot" yesterday morning.  Pt states since that time has not had any further vaginal bleeding. States that since the passing of that clot her pain was improved. Currently denies any vaginal bleeding.   Pt had similar episodes this month and was evaluated by gyn during her inpatient stay, recommended for outpatient follow up and referred to [facility].  Pt. is followed by out/Pt urologist Dr. Trinidad [phone].  Pt. was also seen by hem/onc,  Pt. is followed by O/P oncologist Dr. Hoyt at [place] [phone].    Last pap smear   pob/gynhx:  ;  x5, most recent  . denies history of std's; no abnl pap smear; history of fibroids; menopause at age 53  pmhx: Anemia, asthma, bladder CA (not on Chemo from May'22), R Nephrostomy tube, solitary kidney presenting with hematuria, Parathyroid tumor.   pshx: hydronephrosis s/p Right Perc nephrostomy tube placement 2021, exchange 2021, myomectomy    all: nkda  sochx: no etoh/drug/tobacco use; denies h/o anxiety/depression    PE:  Vital Signs Last 24 Hrs  ICU Vital Signs Last 24 Hrs  T(C): 36.5 (01 Aug 2022 08:16), Max: 37.2 (01 Aug 2022 04:10)  T(F): 97.7 (01 Aug 2022 08:16), Max: 99 (01 Aug 2022 04:10)  HR: 65 (01 Aug 2022 08:16) (59 - 77)  BP: 126/74 (01 Aug 2022 08:16) (105/62 - 126/74)  RR: 16 (01 Aug 2022 08:16) (16 - 19)  SpO2: 99% (01 Aug 2022 08:16) (99% - 100%)    O2 Parameters below as of 01 Aug 2022 08:16  Patient On (Oxygen Delivery Method): room air    PE: Pt seen in ED holding area, resting comfortably with no acute complaints  Gen: awake and alert. NAD.   Abd: Soft, nontender, nondistended.  Pelvis: no active vaginal bleeding. Patient refuses pelvic exam and repeat pelvic sonogram.     Labs:                         10.1   7.14  )-----------( 316      ( 01 Aug 2022 06:35 )             32.6     08-    142  |  109<H>  |  16  ----------------------------<  79  4.3   |  26  |  1.09    Ca    10.4      01 Aug 2022 06:35  Phos  3.2       Mg     2.4         TPro  7.4  /  Alb  2.6<L>  /  TBili  0.2  /  DBili  x   /  AST  11  /  ALT  13  /  AlkPhos  95        Radiology:    CT Abdomen and Pelvis No Cont (22 @ 14:38) >  PROCEDURE DATE:  2022    Sagittal and coronal reformats were performed.  FINDINGS:  CHEST:  LUNGS AND LARGE AIRWAYS: Patent central airways. Multiple bilateral   pulmonary nodules, a representative nodule in the left lower lobe   measures 2.5 x 1.9 cm. A few of the nodules also demonstrate cavitation.   Overall interval increase since recent CT of 2022.  PLEURA: No pleural effusion.  VESSELS: Within normal limits.  HEART: Heart size is normal. No pericardial effusion. ICD leads in the   right atrium and right medical.  MEDIASTINUM AND FLORENTIN: No lymphadenopathy.  CHEST WALL AND LOWER NECK: Within normal limits.  ABDOMEN AND PELVIS:  LIVER: A heterogeneous low-density mass of 2 cm in segment 7, previously   1.3 cm.  BILE DUCTS: Normal caliber.  GALLBLADDER: Within normal limits.  SPLEEN: Within normal limits.  PANCREAS: Within normal limits.  ADRENALS: Within normal limits.  KIDNEYS/URETERS: Right percutaneous nephrostomy catheter. 4 mm   nonobstructing stone in the lower pole. Left kidney is markedly enlarged   with hydronephrosis and cortical thinning, likely chronic. Left ureter is   dilated to the level of the urinary bladder.  BLADDER/REPRODUCTIVE ORGANS: Marked mural thickening of the urinary   bladder which is inseparable from a soft tissue density which may   represent combination of uterus and additional neoplasm.  BOWEL: No bowel obstruction. Appendix is normal. Moderate to large stool.  PERITONEUM: No ascites.  VESSELS: Within normal limits.  RETROPERITONEUM/LYMPH NODES: Retroperitoneal lymphadenopathy, a   representative node of 1.5 x 2.3 cm (2, 141).  ABDOMINAL WALL: Within normal limits.  BONES: Within normal limits.    IMPRESSION:  Interval progression of metastatic disease in the chest.  Interval progression of right hepatic lesion.  Appearance of the urinary bladder is without significant change and is   likely from neoplasm. Urinary bladder is also inseparable from another   soft tissue density in the pelvis which may represent combination of   uterus or additional disease.  Retroperitoneal lymphadenopathy is without significant change.    US Pelvis Complete (US Pelvis Complete .) (22 @ 16:13) >  PROCEDURE DATE:  2022    INTERPRETATION:  CLINICAL INFORMATION: Postmenopausal bleeding. Evaluate   endometrial thickness.  COMPARISON: CT abdomen/pelvis 2022.  TECHNIQUE:  Transabdominal pelvic sonogram only. Patient declined endovaginal   examination.  FINDINGS:  There is an 8 cm soft tissue mass with central cystic change noted within   the central pelvis; reference should be made to the report of CT   examination performed 2022. Posterior to the central pelvic mass a   structure measuring 5.6 x 3.8 x 3.5 cm is identified, which may represent   a portion of the uterus (lower uterine segment). The endometrium is not   visualized.  Fluid: No gross free pelvic fluid identified.    reviewed with Dr. Fuller

## 2022-08-01 NOTE — H&P ADULT - NSHPPHYSICALEXAM_GEN_ALL_CORE
T(C): 36.5 (08-01-22 @ 08:16), Max: 37.2 (08-01-22 @ 04:10)  T(F): 97.7 (08-01-22 @ 08:16), Max: 99 (08-01-22 @ 04:10)  HR: 65 (08-01-22 @ 08:16) (59 - 84)  BP: 126/74 (08-01-22 @ 08:16) (105/62 - 126/74)  RR: 16 (08-01-22 @ 08:16) (16 - 19)  SpO2: 99% (08-01-22 @ 08:16) (98% - 100%)    GENERAL: NAD, anxious, cachectic  HEAD:  Atraumatic, Normocephalic  EYES: EOMI, PERRLA, conjunctiva and sclera clear  ENT: Moist mucous membranes  NECK: Supple, No JVD  CHEST/LUNG: Clear to auscultation bilaterally; No rales, rhonchi, wheezing, or rubs. Unlabored respirations  HEART: Regular rate and rhythm; No murmurs, rubs, or gallops  ABDOMEN: Bowel sounds present; Soft, tenderness in the flanks, no CVA  EXTREMITIES:  2+ Peripheral Pulses, brisk capillary refill. No clubbing, cyanosis, or edema  NERVOUS SYSTEM:  Alert & Oriented X3, speech clear. No deficits   MSK: FROM all 4 extremities, full and equal strength  SKIN: No rashes or lesions

## 2022-08-01 NOTE — H&P ADULT - NSHPLABSRESULTS_GEN_ALL_CORE
10.1   7.14  )-----------( 316      ( 01 Aug 2022 06:35 )             32.6     08-    142  |  109<H>  |  16  ----------------------------<  79  4.3   |  26  |  1.09    Ca    10.4      01 Aug 2022 06:35  Phos  3.2       Mg     2.4         TPro  7.4  /  Alb  2.6<L>  /  TBili  0.2  /  DBili  x   /  AST  11  /  ALT  13  /  AlkPhos  95          LIVER FUNCTIONS - ( 01 Aug 2022 06:35 )  Alb: 2.6 g/dL / Pro: 7.4 g/dL / ALK PHOS: 95 U/L / ALT: 13 U/L DA / AST: 11 U/L / GGT: x           PT/INR - ( 2022 10:35 )   PT: 13.6 sec;   INR: 1.14 ratio         PTT - ( 2022 10:35 )  PTT:32.5 sec  Urinalysis Basic - ( 01 Aug 2022 07:34 )    Color: Yellow / Appearance: Slightly Turbid / S.015 / pH: x  Gluc: x / Ketone: Negative  / Bili: Negative / Urobili: Negative   Blood: x / Protein: 500 mg/dL / Nitrite: Negative   Leuk Esterase: Small / RBC: 25-50 /HPF / WBC 3-5 /HPF   Sq Epi: x / Non Sq Epi: Few /HPF / Bacteria: Few /HPF          Blood, Urine: Large ( @ 07:34)  Blood, Urine: Large ( @ 12:16)          EKG:     RADIOLOGY STUDIES:    LUNGS AND LARGE AIRWAYS: Patent central airways. Multiple bilateral   pulmonary nodules, a representative nodule in the left lower lobe   measures 2.5 x 1.9 cm. A few of the nodules also demonstrate cavitation.   Overall interval increase since recent CT of 2022.    LIVER: A heterogeneous low-density mass of 2 cm in segment 7, previously   1.3 cm.    KIDNEYS/URETERS: Right percutaneous nephrostomy catheter. 4 mm   nonobstructing stone in the lower pole. Left kidney is markedly enlarged   with hydronephrosis and cortical thinning, likely chronic. Left ureter is   dilated to the level of the urinary bladder.    BLADDER/REPRODUCTIVE ORGANS: Marked mural thickening of the urinary   bladder which is inseparable from a soft tissue density which may   represent combination of uterus and additional neoplasm.    RETROPERITONEUM/LYMPH NODES: Retroperitoneal lymphadenopathy, a   representative node of 1.5 x 2.3 cm (2, 141).

## 2022-08-01 NOTE — H&P ADULT - PROBLEM SELECTOR PLAN 2
patient came with b/L flank pain  has nephrostomy tube on Right and non functioning Left kidney    -states the nephrostomy tube was not flushing for 3 days  -pain got better with tylenol in the ED  -Nephrostomy site is intact and tube had some drainage  -Urology consulted patient came with b/L flank pain  has nephrostomy tube on Right and non functioning Left kidney    -states the nephrostomy tube was not flushing for 3 days  -pain got better with tylenol in the ED  -Nephrostomy site is intact and tube had some drainage  -Urology consulted  -IR consulted

## 2022-08-01 NOTE — H&P ADULT - PROBLEM SELECTOR PLAN 4
comes in with abdominal pain   likely due to mechanical obstruction of Left, with less drainage on right        CT: Interval progression of metastatic disease in the chest.  Interval progression of right hepatic lesion.  Appearance of the urinary bladder is without significant change and is   likely from neoplasm. Urinary bladder is also inseparable from another   soft tissue density in the pelvis which may represent combination of   uterus or additional disease.  Retroperitoneal lymphadenopathy is without significant change.    -hemonc consulted comes in with abdominal pain   likely due to mechanical obstruction of Left, with less drainage on right        CT: Interval progression of metastatic disease in the chest.  Interval progression of right hepatic lesion.  Appearance of the urinary bladder is without significant change and is   likely from neoplasm. Urinary bladder is also inseparable from another   soft tissue density in the pelvis which may represent combination of   uterus or additional disease.  Retroperitoneal lymphadenopathy is without significant change.    -hem onc consulted      despite diagnosis / findings, pt wants to continue aggressive treatment as able

## 2022-08-01 NOTE — H&P ADULT - NSHPREVIEWOFSYSTEMS_GEN_ALL_CORE
REVIEW OF SYSTEMS:    CONSTITUTIONAL:+ weakness, - fevers or chills  EYES/ENT: No visual changes;  No vertigo or throat pain   NECK: No pain or stiffness  RESPIRATORY: No cough, wheezing, hemoptysis; No shortness of breath  CARDIOVASCULAR: No chest pain or palpitations  GASTROINTESTINAL: + B/L  flank abdominal or epigastric pain.   GENITOURINARY: No dysuria, frequency + hematuria  NEUROLOGICAL: No numbness or weakness  SKIN: No itching, rashes

## 2022-08-01 NOTE — CHART NOTE - NSCHARTNOTEFT_GEN_A_CORE
HPI:  56 Y o post menopausal female, with PMH of Anemia, Asthma, Bladder cancer (previously on chemo until 5/2022) with  Dr. Renay Trinidad, R nephrostomy tube, hyperparathyroidism, bradycardia with PPM, recent UTI admission 7/18, who comes in due to 3 day hx of gradual onset, worsening, constant 8/10 intermittent cramping B/l flank pain alleviated by tylenol worse with urination surrounding a clogged- non flushing nephrostomy tube,  a/w chills, fatigue. Patient also reports intermittent chronic hematuria, however today she reports large clots from the vagina. Has had loose stols, no diarrhea, no burning or urinary frequency, no fevers, no chest pain no sob.    In the ED HD stabe and AF  Exam: cachectic with moderate distress clutching the flanks, no CVA tenderness  Labs: Hgb 10.3 from 9.7, UA+Ve for Ketones and blood no white blood cells    CT abdomen and pelvis and CT chest show progression of metastatic disease and soft tissue density in the pelvis. Also shows retroperitoneal LAD    Admitted for management of Nephroostomy tube malfunction and for evaluation of post menopausal bleeding   (01 Aug 2022 03:15)        OBJECTIVE:  Vital Signs Last 24 Hrs  T(C): 36.9 (01 Aug 2022 16:00), Max: 37.2 (01 Aug 2022 04:10)  T(F): 98.5 (01 Aug 2022 16:00), Max: 99 (01 Aug 2022 04:10)  HR: 66 (01 Aug 2022 16:00) (59 - 77)  BP: 95/54 (01 Aug 2022 16:00) (95/54 - 126/74)  BP(mean): 68 (01 Aug 2022 16:00) (68 - 68)  RR: 17 (01 Aug 2022 16:00) (16 - 19)  SpO2: 98% (01 Aug 2022 16:00) (98% - 100%)    Parameters below as of 01 Aug 2022 16:00  Patient On (Oxygen Delivery Method): room air        LABS:                        10.1   7.14  )-----------( 316      ( 01 Aug 2022 06:35 )             32.6     08-01    142  |  109<H>  |  16  ----------------------------<  79  4.3   |  26  |  1.09    Ca    10.4      01 Aug 2022 06:35  Phos  3.2     08-01  Mg     2.4     08-01    TPro  7.4  /  Alb  2.6<L>  /  TBili  0.2  /  DBili  x   /  AST  11  /  ALT  13  /  AlkPhos  95  08-01      ASSESSMENT:  1:  Bilat flank pain  2:  Postmenopausal bleeding  3:  Metastatic CA  4:  UTI    PLAN:   1:  Likely secondary to metastatic disease, pain mgmt consulted  2:  GYN consulted, pt refused GYN eval, wants to see her own oncologist  3:  QMA consulted, chemo as outpatient, core needle biopsy of an accessible metastatic lesion (such as pulmonary or hepatic) for NGS testing  4:  Dr. Rodriguez, ID, consulted    FOLLOW UP/RESULTS:    1:  Pain mgmt recs  2:  Outpatient follow up  3:  Core needle biopsy when stable  4:  ID recs  AM labs

## 2022-08-01 NOTE — H&P ADULT - HISTORY OF PRESENT ILLNESS
56 Y o post menopausal female, with PMH of Anemia, Asthma, Bladder cancer (previously on chemo until 5/2022) with  Dr. Renay Trinidad, R nephrostomy tube, hyperparathyroidism, bradycardia with PPM, recent UTI admission 7/18, who comes in due to 3 day hx of gradual onset, worsening, constant 8/10 intermittent cramping B/l flank pain alleviated by tylenol worse with urination surrounding a clogged- non flushing nephrostomy tube,  a/w chills, fatigue. Patient also reports intermittent chronic hematuria, however today she reports large clots from the vagina. Has had loose stols, no diarrhea, no burning or urinary frequency, no fevers, no chest pain no sob.    In the ED HD stabe and AF  Exam: cachectic with moderate distress clutching the flanks, no CVA tenderness  Labs: Hgb 10.3 from 9.7, UA+Ve for Ketones and blood no white blood cells    CT abdomen and pelvis and CT chest show progression of metastatic disease and soft tissue density in the pelvis. Also shows retroperitoneal LAD    Admitted for management of Nephroostomy tube malfunction and for evaluation of post menopausal bleeding

## 2022-08-01 NOTE — CONSULT NOTE ADULT - SUBJECTIVE AND OBJECTIVE BOX
HPI  56F with PMH of Anemia, Asthma, metastatic bladder cancer (previously on chemo until 2022) with  Dr. Renay Trinidad, R nephrostomy tube, Nonfunctioning L kidney, hyperparathyroidism, bradycardia with PPM, recent UTI ED visit , recently discharged, who presents with bilateral flank pain and R nephrostomy tube with urine leakage around tube site and inability to flush freely.     At recent outpatient visit with urologist Dr. Trinidad, PET scans reviewed showing avid lymphadenopathy, L pelvic and ureteral nodularity with concern for metastatic disease. On recent CT completed during previous admission , showed suspected pulmonary and liver metastasis. She has been on gem/carbo, with cessation in  for treatment of COVID.     Patient also complaining of large clots from the vagina. In the setting of chronic metastatic bladder cancer, at baseline has intermittent gross hematuria.    On this admission, patient has been afebrile, Hgb 10.3 from 9.7 on , Cr 1.09, UA with few bacteria, and small leuks, 25-50 RBC. CT abdomen and pelvis and CT chest show progression of metastatic disease and soft tissue density in the pelvis. Also shows retroperitoneal LAD. R kidney with nephrostomy tube visualized, no hydronephrosis noted, L kidney with chronic severe cortical thinning and hydroureteronephrosis to the level of the bladder. Received x1 cefepime, Tylenol x1 for pain control.     Admitted for medicine for management of nephrostomy tube and work up of vaginal bleeding.      PAST MEDICAL & SURGICAL HISTORY:  Anemia      Asthma      HLD (hyperlipidemia)      Pacemaker  [employer]      Bladder cancer      HTN (hypertension)      Parathyroid tumor      Liver cyst      Hydronephrosis  has right Nephrostomy tube - dressing intact      Bradycardia      History of renal calculi      H/O myomectomy      Presence of cardiac pacemaker      H/O hydronephrosis  s/p Right Perc nephrostomy tube placement 2021, exchange 2021          MEDICATIONS  (STANDING):  heparin   Injectable 5000 Unit(s) SubCutaneous every 12 hours  sodium chloride 0.9%. 1000 milliLiter(s) (80 mL/Hr) IV Continuous <Continuous>    MEDICATIONS  (PRN):  acetaminophen     Tablet .. 325 milliGRAM(s) Oral every 6 hours PRN Temp greater or equal to 38C (100.4F), Mild Pain (1 - 3)  melatonin 3 milliGRAM(s) Oral at bedtime PRN Insomnia      FAMILY HISTORY:  Family history of prostate cancer (Father)    Family history of CHF (congestive heart failure) (Father)    Family history of atrial fibrillation (Father)        Allergies    No Known Allergies    Intolerances        SOCIAL HISTORY:    REVIEW OF SYSTEMS: Otherwise negative as stated in HPI    Physical Exam  Vital signs  T(C): 36.5 (22 @ 08:16), Max: 37.2 (22 @ 04:10)  HR: 65 (22 @ 08:16)  BP: 126/74 (22 @ 08:16)  SpO2: 99% (22 @ 08:16)  Wt(kg): --    Output    INCOMPLETE  Gen: awake and alert. NAD.   Pulm: Normal work of breathing on RA  HEENT: Normocephalic, atraumatic. EOMI   CV: Regular rate  Abd: Soft, nontender, nondistended.  : Voiding freely        LABS:       @ 06:35    WBC 7.14  / Hct 32.6  / SCr 1.09      @ 10:35    WBC 8.42  / Hct 32.8  / SCr 1.15         142  |  109<H>  |  16  ----------------------------<  79  4.3   |  26  |  1.09    Ca    10.4      01 Aug 2022 06:35  Phos  3.2       Mg     2.4         TPro  7.4  /  Alb  2.6<L>  /  TBili  0.2  /  DBili  x   /  AST  11  /  ALT  13  /  AlkPhos  95      PT/INR - ( 2022 10:35 )   PT: 13.6 sec;   INR: 1.14 ratio         PTT - ( 2022 10:35 )  PTT:32.5 sec  Urinalysis Basic - ( 01 Aug 2022 07:34 )    Color: Yellow / Appearance: Slightly Turbid / S.015 / pH: x  Gluc: x / Ketone: Negative  / Bili: Negative / Urobili: Negative   Blood: x / Protein: 500 mg/dL / Nitrite: Negative     Leuk Esterase: Small / RBC: 25-50 /HPF / WBC 3-5 /HPF   Sq Epi: x / Non Sq Epi: Few /HPF / Bacteria: Few /HPF        Urine Cx: Pending From NT, CC      RADIOLOGY:  < from: CT Abdomen and Pelvis No Cont (22 @ 14:38) >  CT ABDOMEN AND PELVIS                        ACC: [id number] EXAM:  CT CHEST                          PROCEDURE DATE:  2022          INTERPRETATION:  CLINICAL INFORMATION: Chest pain, lower abdominal pain,   vaginal bleeding. History of sarcoma.    COMPARISON: 2022.    CONTRAST/COMPLICATIONS:  IV Contrast: NONE  Oral Contrast: NONE  Complications: None reported at time of study completion    PROCEDURE:  CT of the Chest, Abdomen and Pelvis was performed.  Sagittal and coronal reformats were performed.    FINDINGS:  CHEST:  LUNGS AND LARGE AIRWAYS: Patent central airways. Multiple bilateral   pulmonary nodules, a representative nodule in the left lower lobe   measures 2.5 x 1.9 cm. A few of the nodules also demonstrate cavitation.   Overall interval increase since recent CT of 2022.  PLEURA: No pleural effusion.  VESSELS: Within normal limits.  HEART: Heart size is normal. No pericardial effusion. ICD leads in the   right atrium and right medical.  MEDIASTINUM AND FLORENTIN: No lymphadenopathy.  CHEST WALL AND LOWER NECK: Within normal limits.    ABDOMEN AND PELVIS:  LIVER: A heterogeneous low-density mass of 2 cm in segment 7, previously   1.3 cm.  BILE DUCTS: Normal caliber.  GALLBLADDER: Within normal limits.  SPLEEN: Within normal limits.  PANCREAS: Within normal limits.  ADRENALS: Within normal limits.  KIDNEYS/URETERS: Right percutaneous nephrostomy catheter. 4 mm   nonobstructing stone in the lower pole. Left kidney is markedly enlarged   with hydronephrosis and cortical thinning, likely chronic. Left ureter is   dilated to the level of the urinary bladder.    BLADDER/REPRODUCTIVE ORGANS: Marked mural thickening of the urinary   bladder which is inseparable from a soft tissue density which may   represent combination of uterus and additional neoplasm.    BOWEL: No bowel obstruction. Appendix is normal. Moderate to large stool.  PERITONEUM: No ascites.  VESSELS: Within normal limits.  RETROPERITONEUM/LYMPH NODES: Retroperitoneal lymphadenopathy, a   representative node of 1.5 x 2.3 cm (2, 141).  ABDOMINAL WALL: Within normal limits.  BONES: Within normal limits.    IMPRESSION:  Interval progression of metastatic disease in the chest.  Interval progression of right hepatic lesion.  Appearance of the urinary bladder is without significant change and is   likely from neoplasm. Urinary bladder is also inseparable from another   soft tissue density in the pelvis which may represent combination of   uterus or additional disease.  Retroperitoneal lymphadenopathy is without significant change.    --- End of Report ---          < end of copied text >     HPI  56F with PMH of Anemia, Asthma, metastatic bladder cancer (previously on chemo until 2022) with  Dr. Renay Trinidad, R nephrostomy tube, Nonfunctioning L kidney, hyperparathyroidism, bradycardia with PPM, recent UTI ED visit , recently discharged, who presents with bilateral flank pain and large clot passed through urethra after which she decided to come to the ED. She reports that she flushed her NT at home before coming to the hospital, after which she has had good urine output from the R. nephrostomy tube.     At recent outpatient visit with urologist Dr. Trinidad, PET scans reviewed showing avid lymphadenopathy, L pelvic and ureteral nodularity with concern for metastatic disease. On recent CT completed during previous admission , showed suspected pulmonary and liver metastasis. She has been on gem/carbo, with cessation in  for treatment of COVID, had last session of chemo May, 2022. .     Patient also complaining of large clots from the vagina. In the setting of chronic metastatic bladder cancer, at baseline has intermittent gross hematuria.    On this admission, patient has been afebrile, Hgb 10.3 from 9.7 on , Cr 1.09, UA with few bacteria, and small leuks, 25-50 RBC. CT abdomen and pelvis and CT chest show progression of metastatic disease and soft tissue density in the pelvis. Also shows retroperitoneal LAD. R kidney with nephrostomy tube visualized, no hydronephrosis noted, L kidney with chronic severe cortical thinning and hydroureteronephrosis to the level of the bladder. Received x1 cefepime, Tylenol x1 for pain control.     Admitted for medicine for management of pain and possible vaginal bleeding.      PAST MEDICAL & SURGICAL HISTORY:  Anemia      Asthma      HLD (hyperlipidemia)      Pacemaker  [employer]      Bladder cancer      HTN (hypertension)      Parathyroid tumor      Liver cyst      Hydronephrosis  has right Nephrostomy tube - dressing intact      Bradycardia      History of renal calculi      H/O myomectomy      Presence of cardiac pacemaker      H/O hydronephrosis  s/p Right Perc nephrostomy tube placement 2021, exchange 2021          MEDICATIONS  (STANDING):  heparin   Injectable 5000 Unit(s) SubCutaneous every 12 hours  sodium chloride 0.9%. 1000 milliLiter(s) (80 mL/Hr) IV Continuous <Continuous>    MEDICATIONS  (PRN):  acetaminophen     Tablet .. 325 milliGRAM(s) Oral every 6 hours PRN Temp greater or equal to 38C (100.4F), Mild Pain (1 - 3)  melatonin 3 milliGRAM(s) Oral at bedtime PRN Insomnia      FAMILY HISTORY:  Family history of prostate cancer (Father)    Family history of CHF (congestive heart failure) (Father)    Family history of atrial fibrillation (Father)        Allergies    No Known Allergies    Intolerances        SOCIAL HISTORY:    REVIEW OF SYSTEMS: Otherwise negative as stated in HPI    Physical Exam  Vital signs  T(C): 36.5 (22 @ 08:16), Max: 37.2 (22 @ 04:10)  HR: 65 (22 @ 08:16)  BP: 126/74 (22 @ 08:16)  SpO2: 99% (22 @ 08:16)  Wt(kg): --    Output    Gen: awake and alert. NAD.   Pulm: Normal work of breathing on RA  CV: Regular rate  Abd: Soft, diffuse abdominal pain with Bilateral CVAT  : Voiding freely, gross hematuria        LABS:       @ 06:35    WBC 7.14  / Hct 32.6  / SCr 1.09      @ 10:35    WBC 8.42  / Hct 32.8  / SCr 1.15         142  |  109<H>  |  16  ----------------------------<  79  4.3   |  26  |  1.09    Ca    10.4      01 Aug 2022 06:35  Phos  3.2       Mg     2.4         TPro  7.4  /  Alb  2.6<L>  /  TBili  0.2  /  DBili  x   /  AST  11  /  ALT  13  /  AlkPhos  95      PT/INR - ( 2022 10:35 )   PT: 13.6 sec;   INR: 1.14 ratio         PTT - ( 2022 10:35 )  PTT:32.5 sec  Urinalysis Basic - ( 01 Aug 2022 07:34 )    Color: Yellow / Appearance: Slightly Turbid / S.015 / pH: x  Gluc: x / Ketone: Negative  / Bili: Negative / Urobili: Negative   Blood: x / Protein: 500 mg/dL / Nitrite: Negative     Leuk Esterase: Small / RBC: 25-50 /HPF / WBC 3-5 /HPF   Sq Epi: x / Non Sq Epi: Few /HPF / Bacteria: Few /HPF        Urine Cx: Pending From NT, CC      RADIOLOGY:  < from: CT Abdomen and Pelvis No Cont (22 @ 14:38) >  CT ABDOMEN AND PELVIS                        ACC: [id number] EXAM:  CT CHEST                          PROCEDURE DATE:  2022          INTERPRETATION:  CLINICAL INFORMATION: Chest pain, lower abdominal pain,   vaginal bleeding. History of sarcoma.    COMPARISON: 2022.    CONTRAST/COMPLICATIONS:  IV Contrast: NONE  Oral Contrast: NONE  Complications: None reported at time of study completion    PROCEDURE:  CT of the Chest, Abdomen and Pelvis was performed.  Sagittal and coronal reformats were performed.    FINDINGS:  CHEST:  LUNGS AND LARGE AIRWAYS: Patent central airways. Multiple bilateral   pulmonary nodules, a representative nodule in the left lower lobe   measures 2.5 x 1.9 cm. A few of the nodules also demonstrate cavitation.   Overall interval increase since recent CT of 2022.  PLEURA: No pleural effusion.  VESSELS: Within normal limits.  HEART: Heart size is normal. No pericardial effusion. ICD leads in the   right atrium and right medical.  MEDIASTINUM AND FLORENTIN: No lymphadenopathy.  CHEST WALL AND LOWER NECK: Within normal limits.    ABDOMEN AND PELVIS:  LIVER: A heterogeneous low-density mass of 2 cm in segment 7, previously   1.3 cm.  BILE DUCTS: Normal caliber.  GALLBLADDER: Within normal limits.  SPLEEN: Within normal limits.  PANCREAS: Within normal limits.  ADRENALS: Within normal limits.  KIDNEYS/URETERS: Right percutaneous nephrostomy catheter. 4 mm   nonobstructing stone in the lower pole. Left kidney is markedly enlarged   with hydronephrosis and cortical thinning, likely chronic. Left ureter is   dilated to the level of the urinary bladder.    BLADDER/REPRODUCTIVE ORGANS: Marked mural thickening of the urinary   bladder which is inseparable from a soft tissue density which may   represent combination of uterus and additional neoplasm.    BOWEL: No bowel obstruction. Appendix is normal. Moderate to large stool.  PERITONEUM: No ascites.  VESSELS: Within normal limits.  RETROPERITONEUM/LYMPH NODES: Retroperitoneal lymphadenopathy, a   representative node of 1.5 x 2.3 cm (2, 141).  ABDOMINAL WALL: Within normal limits.  BONES: Within normal limits.    IMPRESSION:  Interval progression of metastatic disease in the chest.  Interval progression of right hepatic lesion.  Appearance of the urinary bladder is without significant change and is   likely from neoplasm. Urinary bladder is also inseparable from another   soft tissue density in the pelvis which may represent combination of   uterus or additional disease.  Retroperitoneal lymphadenopathy is without significant change.    --- End of Report ---          < end of copied text >

## 2022-08-01 NOTE — H&P ADULT - PROBLEM SELECTOR PLAN 5
P/w flank pain  likely in the setting of bladder cancer  the UA is +ve for blood and ketones  has nephrostomy tube on right and hydro on the left  at risk for pyuria and UTI  s/p abx in the ed  no indication to continue   however will monitor vitals P/w flank pain  likely in the setting of bladder cancer  the UA is +ve for blood and ketones  has nephrostomy tube on right and hydro on the left  at risk for pyuria and UTI  s/p abx in the ed  no indication to continue   however will monitor vitals  -f/u UA, UCx from the nephro tube and the bladder  -Dr. Rodriguez consulted

## 2022-08-01 NOTE — CONSULT NOTE ADULT - SUBJECTIVE AND OBJECTIVE BOX
Reason for consult: Bladder cancer    HPI:  This is a 56 year old post menopausal female with bladder cancer, on chemotherapy with last dose in May 2022 due to recurrent hospitalizations since, managed by Dr Kurtis Hoyt at [facility] and Dr Renay Trinidad, urologist. We are consulted regarding her bladder cancer. Her past medical history also includes Anemia, Asthma, R nephrostomy tube, hyperparathyroidism, bradycardia with PPM, recent UTI admission 7/18. She presents with 3 day hx of gradual onset, worsening, constant 8/10 intermittent cramping B/l flank pain alleviated by tylenol worse with urination surrounding a clogged, non flushing nephrostomy tube,  a/w chills, fatigue. Patient also reports intermittent chronic hematuria, however today she reports large clots from the vagina. Has had loose stools, no diarrhea, no burning or urinary frequency, no fevers, no chest pain no sob.    CT abdomen and pelvis and CT chest show progression of metastatic disease and soft tissue density in the pelvis. Also shows retroperitoneal LAD    Admitted for management of Nephrostomy tube malfunction and for evaluation of post menopausal bleeding (01 Aug 2022 03:15)    PAST MEDICAL & SURGICAL HISTORY:  Anemia  Asthma  HLD (hyperlipidemia)  Pacemaker, [employer]  Bladder cancer  HTN (hypertension)  Parathyroid tumor  Liver cyst  Hydronephrosis, has right Nephrostomy tube  Bradycardia  History of renal calculi  H/O myomectomy  Presence of cardiac pacemaker  H/O hydronephrosis  s/p Right Perc nephrostomy tube placement 02/2021, exchange 06/2021    FAMILY HISTORY:  Family history of prostate cancer (Father)  Family history of CHF (congestive heart failure) (Father)  Family history of atrial fibrillation (Father)    Alcohol Denied  Smoking: Nonsmoker  Drug Use: Denied  Marital Status:     Allergies  No Known Allergies    MEDICATIONS  (STANDING):  heparin   Injectable 5000 Unit(s) SubCutaneous every 12 hours  sodium chloride 0.9%. 1000 milliLiter(s) (80 mL/Hr) IV Continuous <Continuous>    MEDICATIONS  (PRN):  acetaminophen     Tablet .. 325 milliGRAM(s) Oral every 6 hours PRN Temp greater or equal to 38C (100.4F), Mild Pain (1 - 3)  melatonin 3 milliGRAM(s) Oral at bedtime PRN Insomnia    ROS:   Negative other than noted    PHYSICAL EXAM:   Not obtained               10.1   7.14  )-----------( 316      ( 01 Aug 2022 06:35 )             32.6       08-01    142  |  109<H>  |  16  ----------------------------<  79  4.3   |  26  |  1.09    Ca    10.4      01 Aug 2022 06:35  Phos  3.2     08-01  Mg     2.4     08-01    TPro  7.4  /  Alb  2.6<L>  /  TBili  0.2  /  DBili  x   /  AST  11  /  ALT  13  /  AlkPhos  95  08-01 7-31 CT C/A/P  COMPARISON: 6/29/2022    FINDINGS:  CHEST:  LUNGS AND LARGE AIRWAYS: Patent central airways. Multiple bilateral   pulmonary nodules, a representative nodule in the left lower lobe   measures 2.5 x 1.9 cm. A few of the nodules also demonstrate cavitation.   Overall interval increase since recent CT of 6/29/2022.  PLEURA: No pleural effusion.  VESSELS: Within normal limits.  HEART: Heart size is normal. No pericardial effusion. ICD leads in the   right atrium and right medical.  MEDIASTINUM AND FLORENTIN: No lymphadenopathy.  CHEST WALL AND LOWER NECK: Within normal limits.    ABDOMEN AND PELVIS:  LIVER: A heterogeneous low-density mass of 2 cm in segment 7, previously   1.3 cm.  BILE DUCTS: Normal caliber.  GALLBLADDER: Within normal limits.  SPLEEN: Within normal limits.  PANCREAS: Within normal limits.  ADRENALS: Within normal limits.  KIDNEYS/URETERS: Right percutaneous nephrostomy catheter. 4 mm   nonobstructing stone in the lower pole. Left kidney is markedly enlarged   with hydronephrosis and cortical thinning, likely chronic. Left ureter is   dilated to the level of the urinary bladder.    BLADDER/REPRODUCTIVE ORGANS: Marked mural thickening of the urinary   bladder which is inseparable from a soft tissue density which may   represent combination of uterus and additional neoplasm.    BOWEL: No bowel obstruction. Appendix is normal. Moderate to large stool.  PERITONEUM: No ascites.  VESSELS: Within normal limits.  RETROPERITONEUM/LYMPH NODES: Retroperitoneal lymphadenopathy, a   representative node of 1.5 x 2.3 cm (2, 141).  ABDOMINAL WALL: Within normal limits.  BONES: Within normal limits.    IMPRESSION:  Interval progression of metastatic disease in the chest.  Interval progression of right hepatic lesion.  Appearance of the urinary bladder is without significant change and is   likely from neoplasm. Urinary bladder is also inseparable from another   soft tissue density in the pelvis which may represent combination of   uterus or additional disease.  Retroperitoneal lymphadenopathy is without significant change.

## 2022-08-01 NOTE — H&P ADULT - PROBLEM SELECTOR PLAN 1
patient came with b/L flank pain  has nephrostomy tube on Right and non functioning Left kidney    CT abdomen: KIDNEYS/URETERS: Right percutaneous nephrostomy catheter. 4 mm   nonobstructing stone in the lower pole.     Left kidney is markedly enlarged   with hydronephrosis and cortical thinning, likely chronic. Left ureter is   dilated to the level of the urinary bladder.    -patient likely is having the sequale of mass effect of bladder cancer (L)  -patient also has a stone on the right   -Likely needs palliative chemo or palliative TURBT or at least resume chemo  -Urology consulted patient came with b/L flank pain  has nephrostomy tube on Right and non functioning Left kidney    CT abdomen: KIDNEYS/URETERS: Right percutaneous nephrostomy catheter. 4 mm   nonobstructing stone in the lower pole.     Left kidney is markedly enlarged   with hydronephrosis and cortical thinning, likely chronic. Left ureter is   dilated to the level of the urinary bladder.    -patient likely is having the sequale of mass effect of bladder cancer (L)  -patient also has a stone on the right   -Likely needs palliative chemo or palliative TURBT or at least resume chemo  -Urology consulted  - IR to eval tubes

## 2022-08-01 NOTE — H&P ADULT - ASSESSMENT
56 Y o post menopausal female, with PMH of Anemia, Asthma, Bladder cancer (previously on chemo until 5/2022) with  Dr. Renay Trinidad, R nephrostomy tube, hyperparathyroidism, bradycardia with PPM, recent UTI admission 7/18, who comes in due to 3 day hx of gradual onset, worsening, constant 8/10 intermittent cramping B/l flank pain alleviated by tylenol worse with urination surrounding a clogged- non flushing nephrostomy tube, while also  reports large clots from the vagina, Hgb 10.3 from 9.7, UA+Ve for Ketones and blood no white blood cells, CT abdomen and pelvis and CT chest show progression of metastatic disease and soft tissue density in the pelvis. Also shows retroperitoneal LAD    Admitted for management of Nephrostomy tube malfunction and for evaluation of post menopausal bleeding 56 Y o post menopausal female, with PMH of Anemia, Asthma, Bladder cancer (previously on chemo until 5/2022) with  Dr. Renay Trinidad, R nephrostomy tube, hyperparathyroidism, bradycardia with PPM, recent UTI admission 7/18, who comes in due to 3 day hx of gradual onset, worsening, constant 8/10 intermittent cramping B/l flank pain alleviated by tylenol worse with urination surrounding a clogged- non flushing nephrostomy tube, while also  reports large clots from the vagina, Hgb 10.3 from 9.7, UA+Ve for Ketones and blood no white blood cells, CT abdomen and pelvis and CT chest show progression of metastatic disease and soft tissue density in the pelvis. Also shows retroperitoneal LAD    Admitted for management of Nephrostomy tube malfunction and for evaluation of post menopausal bleeding

## 2022-08-01 NOTE — CONSULT NOTE ADULT - ASSESSMENT
56F  who presents with bilateral flank pain and R nephrostomy tube with urine leakage, gyn consulted for postmenopausal bleeding.

## 2022-08-02 NOTE — CONSULT NOTE ADULT - PROBLEM SELECTOR RECOMMENDATION 9
Pt defers pelvic exam and pelvic sonogram as she was worked up on previous admission and recommended to follow up outpatient at [facility].  No further acute gyn interventions at this time.   Pt prefers to speak to her oncologist before any pelvic examination and agrees to follow up with gyn once medically stable.  Reconsult as needed
Pt with chronic abdominal and b/l flank pain which is somatic and neuropathic in nature due to metastatic bladder CA and right nephrostomy tube. Pt also with frontal headache.   Opioid pain recommendations   - Morphine 7.5mg PO q 6 hours PRN severe pain. Monitor for sedation/ respiratory depression.   Non-opioid pain recommendations   - Avoid NSIADs- CRYSTAL and pt with solitary kidney  - Continue Acetaminophen 325 mg PO q 4 hours PRN moderate pain. Monitor LFT (dose per pt request)   Bowel Regimen  - Miralax 17G PO daily PRN constipation  - Senna 2 tablets at bedtime for constipation  Mild pain   - Non-pharmacological pain treatment recommendations  - Warm/ Cool packs PRN   - Repositioning, imagery, relaxation, distraction.  - Physical therapy OOB if no contraindications   Recommendations discussed with primary team and RN

## 2022-08-02 NOTE — PROGRESS NOTE ADULT - SUBJECTIVE AND OBJECTIVE BOX
HPI:  56 Y o post menopausal female, with PMH of Anemia, Asthma, Bladder cancer (previously on chemo until 2022) with  Dr. Renay Trinidad, R nephrostomy tube, hyperparathyroidism, bradycardia with PPM, recent UTI admission , who comes in due to 3 day hx of gradual onset, worsening, constant 8/10 intermittent cramping B/l flank pain alleviated by tylenol worse with urination surrounding a clogged- non flushing nephrostomy tube,  a/w chills, fatigue. Patient also reports intermittent chronic hematuria, however today she reports large clots from the vagina. Has had loose stols, no diarrhea, no burning or urinary frequency, no fevers, no chest pain no sob.    In the ED HD stabe and AF  Exam: cachectic with moderate distress clutching the flanks, no CVA tenderness  Labs: Hgb 10.3 from 9.7, UA+Ve for Ketones and blood no white blood cells    CT abdomen and pelvis and CT chest show progression of metastatic disease and soft tissue density in the pelvis. Also shows retroperitoneal LAD    Admitted for management of Nephroostomy tube malfunction and for evaluation of post menopausal bleeding   (01 Aug 2022 03:15)      Patient is a 56y old  Female who presents with a chief complaint of Abdominal pain (02 Aug 2022 10:53)      INTERVAL HPI/OVERNIGHT EVENTS:  T(C): 37.2 (22 @ 13:22), Max: 37.7 (22 @ 20:52)  HR: 66 (22 @ 13:22) (57 - 66)  BP: 98/54 (22 @ 13:22) (90/46 - 110/55)  RR: 16 (22 @ 13:22) (16 - 18)  SpO2: 100% (22 @ 13:22) (98% - 100%)  Wt(kg): --  I&O's Summary    01 Aug 2022 07:01  -  02 Aug 2022 07:00  --------------------------------------------------------  IN: 0 mL / OUT: 400 mL / NET: -400 mL    02 Aug 2022 07:01  -  02 Aug 2022 14:18  --------------------------------------------------------  IN: 0 mL / OUT: 400 mL / NET: -400 mL        REVIEW OF SYSTEMS: denies fever, chills, SOB, palpitations, chest pain, abdominal pain, nausea, vomitting, diarrhea, constipation, dizziness    MEDICATIONS  (STANDING):  senna 2 Tablet(s) Oral at bedtime  sodium chloride 0.9%. 1000 milliLiter(s) (100 mL/Hr) IV Continuous <Continuous>    MEDICATIONS  (PRN):  acetaminophen     Tablet .. 325 milliGRAM(s) Oral every 4 hours PRN Temp greater or equal to 38C (100.4F), Mild Pain (1 - 3)  melatonin 3 milliGRAM(s) Oral at bedtime PRN Insomnia  morphine  IR 7.5 milliGRAM(s) Oral every 6 hours PRN Severe Pain (7 - 10)      PHYSICAL EXAM:  GENERAL: NAD, well-groomed, well-developed  HEAD:  Atraumatic, Normocephalic  EYES: EOMI, PERRLA, conjunctiva and sclera clear  ENMT: No tonsillar erythema, exudates, or enlargement; Moist mucous membranes, Good dentition, No lesions  NECK: Supple, No JVD, Normal thyroid  NERVOUS SYSTEM:  Alert & Oriented X3, Good concentration; Motor Strength 5/5 B/L upper and lower extremities; DTRs 2+ intact and symmetric  CHEST/LUNG: Clear to percussion bilaterally; No rales, rhonchi, wheezing, or rubs  HEART: Regular rate and rhythm; No murmurs, rubs, or gallops  ABDOMEN: Soft, Nontender, Nondistended; Bowel sounds present  EXTREMITIES:  2+ Peripheral Pulses, No clubbing, cyanosis, or edema  LYMPH: No lymphadenopathy noted  SKIN: No rashes or lesions  LABS:                        10.1   8.13  )-----------( 304      ( 02 Aug 2022 06:40 )             32.1     08-02    141  |  107  |  24<H>  ----------------------------<  83  4.1   |  26  |  1.31<H>    Ca    10.1      02 Aug 2022 06:40  Phos  3.3     08-  Mg     2.5     08-    TPro  7.4  /  Alb  2.6<L>  /  TBili  0.2  /  DBili  x   /  AST  11  /  ALT  13  /  AlkPhos  95  08-      Urinalysis Basic - ( 01 Aug 2022 07:34 )    Color: Yellow / Appearance: Slightly Turbid / S.015 / pH: x  Gluc: x / Ketone: Negative  / Bili: Negative / Urobili: Negative   Blood: x / Protein: 500 mg/dL / Nitrite: Negative   Leuk Esterase: Small / RBC: 25-50 /HPF / WBC 3-5 /HPF   Sq Epi: x / Non Sq Epi: Few /HPF / Bacteria: Few /HPF      CAPILLARY BLOOD GLUCOSE            Urinalysis Basic - ( 01 Aug 2022 07:34 )    Color: Yellow / Appearance: Slightly Turbid / S.015 / pH: x  Gluc: x / Ketone: Negative  / Bili: Negative / Urobili: Negative   Blood: x / Protein: 500 mg/dL / Nitrite: Negative   Leuk Esterase: Small / RBC: 25-50 /HPF / WBC 3-5 /HPF   Sq Epi: x / Non Sq Epi: Few /HPF / Bacteria: Few /HPF

## 2022-08-02 NOTE — CONSULT NOTE ADULT - ASSESSMENT
Confidential Drug Utilization Report  Search Terms: Zoë Bell, 1965Search Date: 08/02/2022 15:05:16 PM  The Drug Utilization Report below displays all of the controlled substance prescriptions, if any, that your patient has filled in the last twelve months. The information displayed on this report is compiled from pharmacy submissions to the Department, and accurately reflects the information as submitted by the pharmacies.    This report was requested by: Pat Mora | Reference #: [id number]    There are no results for the search terms that you entered.

## 2022-08-02 NOTE — CONSULT NOTE ADULT - CONSULT REASON
Bladder cancer
Bladder cancer
vaginal bleeding , postmenopausal
UTI
chest pain/ sss, s/p ppm
abdominal pain

## 2022-08-02 NOTE — PROGRESS NOTE ADULT - SUBJECTIVE AND OBJECTIVE BOX
Patient is a 56y old  Female who presents with a chief complaint of Abdominal pain (02 Aug 2022 15:57)      INTERVAL HPI/OVERNIGHT EVENTS: no acute envents onverngiht    REVIEW OF SYSTEMS:  CONSTITUTIONAL: No fever, chills  ENMT:  No difficulty hearing, no change in vision  NECK: No pain or stiffness  RESPIRATORY: No cough, SOB  CARDIOVASCULAR: No chest pain, palpitations  GASTROINTESTINAL: No abdominal pain. No nausea, vomiting, or diarrhea  GENITOURINARY: No dysuria  NEUROLOGICAL: No HA  SKIN: No itching, burning, rashes, or lesions   LYMPH NODES: No enlarged glands  ENDOCRINE: No heat or cold intolerance; No hair loss  MUSCULOSKELETAL: No joint pain or swelling; No muscle, back, or extremity pain  PSYCHIATRIC: No depression, anxiety  HEME/LYMPH: No easy bruising, or bleeding gums    T(C): 37.2 (22 @ 13:22), Max: 37.7 (22 @ 20:52)  HR: 66 (22 @ 13:22) (57 - 66)  BP: 98/54 (22 @ 13:22) (90/46 - 110/55)  RR: 16 (22 @ 13:22) (16 - 18)  SpO2: 100% (22 @ 13:22) (100% - 100%)  Wt(kg): --Vital Signs Last 24 Hrs  T(C): 37.2 (02 Aug 2022 13:22), Max: 37.7 (01 Aug 2022 20:52)  T(F): 99 (02 Aug 2022 13:22), Max: 99.9 (01 Aug 2022 20:52)  HR: 66 (02 Aug 2022 13:22) (57 - 66)  BP: 98/54 (02 Aug 2022 13:22) (90/46 - 110/55)  BP(mean): 58 (01 Aug 2022 20:52) (58 - 64)  RR: 16 (02 Aug 2022 13:22) (16 - 18)  SpO2: 100% (02 Aug 2022 13:22) (100% - 100%)    Parameters below as of 02 Aug 2022 13:22  Patient On (Oxygen Delivery Method): room air    MEDICATIONS  (STANDING):  senna 2 Tablet(s) Oral at bedtime  sodium chloride 0.9%. 1000 milliLiter(s) (100 mL/Hr) IV Continuous <Continuous>    MEDICATIONS  (PRN):  acetaminophen     Tablet .. 325 milliGRAM(s) Oral every 4 hours PRN Temp greater or equal to 38C (100.4F), Mild Pain (1 - 3)  melatonin 3 milliGRAM(s) Oral at bedtime PRN Insomnia  morphine  IR 7.5 milliGRAM(s) Oral every 6 hours PRN Severe Pain (7 - 10)  polyethylene glycol 3350 17 Gram(s) Oral daily PRN Constipation      PHYSICAL EXAM:  GENERAL: NAD  EYES: clear conjunctiva; EOMI  ENMT: Moist mucous membranes  NECK: Supple, No JVD, Normal thyroid  CHEST/LUNG: Clear to auscultation bilaterally; No rales, rhonchi, wheezing, or rubs  HEART: S1, S2, Regular rate and rhythm  ABDOMEN: Soft, Nontender, Nondistended; Bowel sounds present, R flank area with neph tune site cdi, draining clear yellow urine  NEURO: Alert & Oriented X3  EXTREMITIES: No LE edema, no calf tenderness  LYMPH: No lymphadenopathy noted  SKIN: No rashes or lesions    Consultant(s) Notes Reviewed:  [x ] YES  [ ] NO  Care Discussed with Consultants/Other Providers [ x] YES  [ ] NO    LABS:                        10.1   8.13  )-----------( 304      ( 02 Aug 2022 06:40 )             32.1     08-02    141  |  110<H>  |  25<H>  ----------------------------<  97  4.8   |  26  |  1.19    Ca    9.2      02 Aug 2022 15:22  Phos  3.3     08-  Mg     2.5     08-    TPro  7.4  /  Alb  2.6<L>  /  TBili  0.2  /  DBili  x   /  AST  11  /  ALT  13  /  AlkPhos  95  08-01      CAPILLARY BLOOD GLUCOSE    Urinalysis Basic - ( 01 Aug 2022 07:34 )    Color: Yellow / Appearance: Slightly Turbid / S.015 / pH: x  Gluc: x / Ketone: Negative  / Bili: Negative / Urobili: Negative   Blood: x / Protein: 500 mg/dL / Nitrite: Negative   Leuk Esterase: Small / RBC: 25-50 /HPF / WBC 3-5 /HPF   Sq Epi: x / Non Sq Epi: Few /HPF / Bacteria: Few /HPF      RADIOLOGY & ADDITIONAL TESTS:    Imaging Personally Reviewed:  [x ] YES  [ ] NO  < from: CT Abdomen and Pelvis No Cont (22 @ 14:38) >  ACC: [id number] EXAM:  CT ABDOMEN AND PELVIS                        ACC: [id number] EXAM:  CT CHEST                          PROCEDURE DATE:  2022          INTERPRETATION:  CLINICAL INFORMATION: Chest pain, lower abdominal pain,   vaginal bleeding. History of sarcoma.    COMPARISON: 2022.    CONTRAST/COMPLICATIONS:  IV Contrast: NONE  Oral Contrast: NONE  Complications: None reported at time of study completion    PROCEDURE:  CT of the Chest, Abdomen and Pelvis was performed.  Sagittal and coronal reformats were performed.    FINDINGS:  CHEST:  LUNGS AND LARGE AIRWAYS: Patent central airways. Multiple bilateral   pulmonary nodules, a representative nodule in the left lower lobe   measures 2.5 x 1.9 cm. A few of the nodules also demonstrate cavitation.   Overall interval increase since recent CT of 2022.  PLEURA: No pleural effusion.  VESSELS: Within normal limits.  HEART: Heart size is normal. No pericardial effusion. ICD leads in the   right atrium and right medical.  MEDIASTINUM AND FLORENTIN: No lymphadenopathy.  CHEST WALL AND LOWER NECK: Within normal limits.    ABDOMEN AND PELVIS:  LIVER: A heterogeneous low-density mass of 2 cm in segment 7, previously   1.3 cm.  BILE DUCTS: Normal caliber.  GALLBLADDER: Within normal limits.  SPLEEN: Within normal limits.  PANCREAS: Within normal limits.  ADRENALS: Within normal limits.  KIDNEYS/URETERS: Right percutaneous nephrostomy catheter. 4 mm   nonobstructing stone in the lower pole. Left kidney is markedly enlarged   with hydronephrosis and cortical thinning, likely chronic. Left ureter is   dilated to the level of the urinary bladder.    BLADDER/REPRODUCTIVE ORGANS: Marked mural thickening of the urinary   bladder which is inseparable from a soft tissue density which may   represent combination of uterus and additional neoplasm.    BOWEL: No bowel obstruction. Appendix is normal. Moderate to large stool.  PERITONEUM: No ascites.  VESSELS: Within normal limits.  RETROPERITONEUM/LYMPH NODES: Retroperitoneal lymphadenopathy, a   representative node of 1.5 x 2.3 cm (2, 141).  ABDOMINAL WALL: Within normal limits.  BONES: Within normal limits.    IMPRESSION:  Interval progression of metastatic disease in the chest.  Interval progression of right hepatic lesion.  Appearance of the urinary bladder is without significant change and is   likely from neoplasm. Urinary bladder is also inseparable from another   soft tissue density in the pelvis which may represent combination of   uterus or additional disease.  Retroperitoneal lymphadenopathy is without significant change.    --- End of Report ---            ALLISON SOLORZANO MD; Attending Radiologist  This document has been electronically signed. 2022  3:00PM    < end of copied text >  < from: CT Chest No Cont (22 @ 14:37) >  ACC: [id number] EXAM:  CT ABDOMEN AND PELVIS                        ACC: [id number] EXAM:  CT CHEST                          PROCEDURE DATE:  2022          INTERPRETATION:  CLINICAL INFORMATION: Chest pain, lower abdominal pain,   vaginal bleeding. History of sarcoma.    COMPARISON: 2022.    CONTRAST/COMPLICATIONS:  IV Contrast: NONE  Oral Contrast: NONE  Complications: None reported at time of study completion    PROCEDURE:  CT of the Chest, Abdomen and Pelvis was performed.  Sagittal and coronal reformats were performed.    FINDINGS:  CHEST:  LUNGS AND LARGE AIRWAYS: Patent central airways. Multiple bilateral   pulmonary nodules, a representative nodule in the left lower lobe   measures 2.5 x 1.9 cm. A few of the nodules also demonstrate cavitation.   Overall interval increase since recent CT of 2022.  PLEURA: No pleural effusion.  VESSELS: Within normal limits.  HEART: Heart size is normal. No pericardial effusion. ICD leads in the   right atrium and right medical.  MEDIASTINUM AND FLORENTIN: No lymphadenopathy.  CHEST WALL AND LOWER NECK: Within normal limits.    ABDOMEN AND PELVIS:  LIVER: A heterogeneous low-density mass of 2 cm in segment 7, previously   1.3 cm.  BILE DUCTS: Normal caliber.  GALLBLADDER: Within normal limits.  SPLEEN: Within normal limits.  PANCREAS: Within normal limits.  ADRENALS: Within normal limits.  KIDNEYS/URETERS: Right percutaneous nephrostomy catheter. 4 mm   nonobstructing stone in the lower pole. Left kidney is markedly enlarged   with hydronephrosis and cortical thinning, likely chronic. Left ureter is   dilated to the level of the urinary bladder.    BLADDER/REPRODUCTIVE ORGANS: Marked mural thickening of the urinary   bladder which is inseparable from a soft tissue density which may   represent combination of uterus and additional neoplasm.    BOWEL: No bowel obstruction. Appendix is normal. Moderate to large stool.  PERITONEUM: No ascites.  VESSELS: Within normal limits.  RETROPERITONEUM/LYMPH NODES: Retroperitoneal lymphadenopathy, a   representative node of 1.5 x 2.3 cm (2, 141).  ABDOMINAL WALL: Within normal limits.  BONES: Within normal limits.    IMPRESSION:  Interval progression of metastatic disease in the chest.  Interval progression of right hepatic lesion.  Appearance of the urinary bladder is without significant change and is   likely from neoplasm. Urinary bladder is also inseparable from another   soft tissue density in the pelvis which may represent combination of   uterus or additional disease.  Retroperitoneal lymphadenopathy is without significant change.    --- End of Report ---            ALLISON SOLORZANO MD; Attending Radiologist  This document has been electronically signed. 2022  3:00PM    < end of copied text >

## 2022-08-02 NOTE — PROGRESS NOTE ADULT - PROBLEM SELECTOR PLAN 4
- CT scan concerning for metastatic disease  - pt follow oncologist at [facility]  - Heme/onc recommending f/u OP for bx with her oncologist, however pt want bx done here  - IR consulted  - recommended CT abd/pelves/chest with IV con  - IR consulted  - npo after MN for poss bx in am

## 2022-08-02 NOTE — CONSULT NOTE ADULT - CONSULT REQUESTED DATE/TIME
01-Aug-2022 09:44
02-Aug-2022
01-Aug-2022 11:42
01-Aug-2022 10:48
01-Aug-2022 15:19
02-Aug-2022 15:00

## 2022-08-02 NOTE — PROGRESS NOTE ADULT - ASSESSMENT
56 Y o post menopausal female, with PMH of Anemia, Asthma, Bladder cancer (previously on chemo until 5/2022) with  Dr. Renay Trinidad, R nephrostomy tube, hyperparathyroidism, bradycardia with PPM, recent UTI admission 7/18, p/w three days gradual onset, worsening, constant 8/10 intermittent cramping B/l flank pain alleviated by tylenol worse with urination surrounding a clogged- non flushing nephrostomy tube, and large clots from the vagina, Hgb 10.3 from 9.7, UA+Ve for Ketones and blood no white blood cells, CT abdomen and pelvis and CT chest show progression of metastatic disease and soft tissue density in the pelvis. Heme/onc Dr Recio following. was recommended to follow up with her heme/onc at [place], however pt is insisting to have bx done here inpatient. IR consulted for bx,  recommended ct [email or web address] with IV con.

## 2022-08-02 NOTE — PROGRESS NOTE ADULT - SUBJECTIVE AND OBJECTIVE BOX
Patient is seen and examined at the bed side, is afebrile.      REVIEW OF SYSTEMS: All other review systems are negative        ALLERGIES: No Known Allergies        Vital Signs Last 24 Hrs  T(C): 37.2 (02 Aug 2022 13:22), Max: 37.7 (01 Aug 2022 20:52)  T(F): 99 (02 Aug 2022 13:22), Max: 99.9 (01 Aug 2022 20:52)  HR: 66 (02 Aug 2022 13:22) (57 - 66)  BP: 98/54 (02 Aug 2022 13:22) (90/46 - 110/55)  BP(mean): 58 (01 Aug 2022 20:52) (58 - 68)  RR: 16 (02 Aug 2022 13:22) (16 - 18)  SpO2: 100% (02 Aug 2022 13:22) (98% - 100%)    Parameters below as of 02 Aug 2022 13:22  Patient On (Oxygen Delivery Method): room air        PHYSICAL EXAM:  GENERAL: Not in distress   CHEST/LUNG:  Not using accessory muscles   HEART: s1 and s2 present  ABDOMEN:  Nontender and  Nondistended  : Right nephrostomy tube in placed and draining clear urine  EXTREMITIES: No pedal  edema  CNS: Awake and Alert      LABS:                        10.1   8.13  )-----------( 304      ( 02 Aug 2022 06:40 )             32.1                           10.1   7.14  )-----------( 316      ( 01 Aug 2022 06:35 )             32.6       08-02    141  |  110<H>  |  25<H>  ----------------------------<  97  4.8   |  26  |  1.19    Ca    9.2      02 Aug 2022 15:22  Phos  3.3     08-02  Mg     2.5     08-02    TPro  7.4  /  Alb  2.6<L>  /  TBili  0.2  /  DBili  x   /  AST  11  /  ALT  13  /  AlkPhos  95  08-    08-01    142  |  109<H>  |  16  ----------------------------<  79  4.3   |  26  |  1.09    Ca    10.4      01 Aug 2022 06:35  Phos  3.2     08-  Mg     2.4     08-    TPro  7.4  /  Alb  2.6<L>  /  TBili  0.2  /  DBili  x   /  AST  11  /  ALT  13  /  AlkPhos  95  08-    PT/INR - ( 2022 10:35 )   PT: 13.6 sec;   INR: 1.14 ratio      PTT - ( 2022 10:35 )  PTT:32.5 sec        Urinalysis Basic - ( 01 Aug 2022 07:34 )  Color: Yellow / Appearance: Slightly Turbid / S.015 / pH: x  Gluc: x / Ketone: Negative  / Bili: Negative / Urobili: Negative   Blood: x / Protein: 500 mg/dL / Nitrite: Negative   Leuk Esterase: Small / RBC: 25-50 /HPF / WBC 3-5 /HPF   Sq Epi: x / Non Sq Epi: Few /HPF / Bacteria: Few /HPF        MEDICATIONS  (STANDING):    senna 2 Tablet(s) Oral at bedtime  sodium chloride 0.9%. 1000 milliLiter(s) (100 mL/Hr) IV Continuous <Continuous>          RADIOLOGY & ADDITIONAL TESTS:    22: CT Abdomen and Pelvis No Cont (22 @ 14:38) Interval progression of metastatic disease in the chest.  Interval progression of right hepatic lesion. Appearance of the urinary bladder is without significant change and is   likely from neoplasm. Urinary bladder is also inseparable from another  soft tissue density in the pelvis which may represent combination of   uterus or additional disease. Retroperitoneal lymphadenopathy is without significant change.      22: CT Chest No Cont (22 @ 14:37) LUNGS AND LARGE AIRWAYS: Patent central airways. Multiple bilateral   pulmonary nodules, a representative nodule in the left lower lobe  measures 2.5 x 1.9 cm. A few of the nodules also demonstrate cavitation.   Overall interval increase since recent CT of 2022.      MICROBIOLOGY DATA:    Culture - Urine (22 @ 12:16)   Specimen Source: Clean Catch Clean Catch (Midstream)   Culture Results: No growth     Respiratory Viral Panel with COVID-19 by [facility] (22 @ 10:35)   Rapid RVP Result: NotDete   SARS-CoV-2: NotDetec:              Patient is seen and examined at the bed side, is afebrile. She is c/o headache, BP is running low normal.      REVIEW OF SYSTEMS: All other review systems are negative      ALLERGIES: No Known Allergies      Vital Signs Last 24 Hrs  T(C): 37.2 (02 Aug 2022 13:22), Max: 37.7 (01 Aug 2022 20:52)  T(F): 99 (02 Aug 2022 13:22), Max: 99.9 (01 Aug 2022 20:52)  HR: 66 (02 Aug 2022 13:) (57 - 66)  BP: 98/54 (02 Aug 2022 13:) (90/46 - 110/55)  BP(mean): 58 (01 Aug 2022 20:52) (58 - 68)  RR: 16 (02 Aug 2022 13:) (16 - 18)  SpO2: 100% (02 Aug 2022 13:) (98% - 100%)    Parameters below as of 02 Aug 2022 13:22  Patient On (Oxygen Delivery Method): room air      PHYSICAL EXAM:  GENERAL: Not in distress   CHEST/LUNG:  Not using accessory muscles   HEART: s1 and s2 present  ABDOMEN:  Nontender and  Nondistended  : Right nephrostomy tube in placed and draining clear urine  EXTREMITIES: No pedal  edema  CNS: Awake and Alert      LABS:                        10.1   8.13  )-----------( 304      ( 02 Aug 2022 06:40 )             32.1                           10.1   7.14  )-----------( 316      ( 01 Aug 2022 06:35 )             32.6       08-02    141  |  110<H>  |  25<H>  ----------------------------<  97  4.8   |  26  |  1.19    Ca    9.2      02 Aug 2022 15:22  Phos  3.3     08-02  Mg     2.5     08-02    TPro  7.4  /  Alb  2.6<L>  /  TBili  0.2  /  DBili  x   /  AST  11  /  ALT  13  /  AlkPhos  95  08-01    08-01    142  |  109<H>  |  16  ----------------------------<  79  4.3   |  26  |  1.09    Ca    10.4      01 Aug 2022 06:35  Phos  3.2     08-  Mg     2.4     08-    TPro  7.4  /  Alb  2.6<L>  /  TBili  0.2  /  DBili  x   /  AST  11  /  ALT  13  /  AlkPhos  95  08-01    PT/INR - ( 2022 10:35 )   PT: 13.6 sec;   INR: 1.14 ratio      PTT - ( 2022 10:35 )  PTT:32.5 sec        Urinalysis Basic - ( 01 Aug 2022 07:34 )  Color: Yellow / Appearance: Slightly Turbid / S.015 / pH: x  Gluc: x / Ketone: Negative  / Bili: Negative / Urobili: Negative   Blood: x / Protein: 500 mg/dL / Nitrite: Negative   Leuk Esterase: Small / RBC: 25-50 /HPF / WBC 3-5 /HPF   Sq Epi: x / Non Sq Epi: Few /HPF / Bacteria: Few /HPF        MEDICATIONS  (STANDING):    senna 2 Tablet(s) Oral at bedtime  sodium chloride 0.9%. 1000 milliLiter(s) (100 mL/Hr) IV Continuous <Continuous>          RADIOLOGY & ADDITIONAL TESTS:    22: CT Abdomen and Pelvis No Cont (22 @ 14:38) Interval progression of metastatic disease in the chest.  Interval progression of right hepatic lesion. Appearance of the urinary bladder is without significant change and is   likely from neoplasm. Urinary bladder is also inseparable from another  soft tissue density in the pelvis which may represent combination of   uterus or additional disease. Retroperitoneal lymphadenopathy is without significant change.      22: CT Chest No Cont (22 @ 14:37) LUNGS AND LARGE AIRWAYS: Patent central airways. Multiple bilateral   pulmonary nodules, a representative nodule in the left lower lobe  measures 2.5 x 1.9 cm. A few of the nodules also demonstrate cavitation.   Overall interval increase since recent CT of 2022.      MICROBIOLOGY DATA:    Culture - Urine (22 @ 12:16)   Specimen Source: Clean Catch Clean Catch (Midstream)   Culture Results: No growth     Respiratory Viral Panel with COVID-19 by [facility] (22 @ 10:35)   Rapid RVP Result: NotDete   SARS-CoV-2: NotDetec:

## 2022-08-02 NOTE — PROGRESS NOTE ADULT - SUBJECTIVE AND OBJECTIVE BOX
Pt seen, feeling ok, not great, still passing clots    MEDICATIONS  (STANDING):  heparin   Injectable 5000 Unit(s) SubCutaneous every 12 hours  senna 2 Tablet(s) Oral at bedtime  sodium chloride 0.9%. 1000 milliLiter(s) (100 mL/Hr) IV Continuous <Continuous>    MEDICATIONS  (PRN):  acetaminophen     Tablet .. 325 milliGRAM(s) Oral every 4 hours PRN Temp greater or equal to 38C (100.4F), Mild Pain (1 - 3)  melatonin 3 milliGRAM(s) Oral at bedtime PRN Insomnia  morphine  IR 7.5 milliGRAM(s) Oral every 6 hours PRN Severe Pain (7 - 10)      ROS  as above, limited 2/2 covid pandemic    Vital Signs Last 24 Hrs  T(C): 36.4 (02 Aug 2022 05:30), Max: 37.7 (01 Aug 2022 20:52)  T(F): 97.5 (02 Aug 2022 05:30), Max: 99.9 (01 Aug 2022 20:52)  HR: 57 (02 Aug 2022 05:30) (57 - 66)  BP: 90/46 (02 Aug 2022 05:30) (90/46 - 110/55)  BP(mean): 58 (01 Aug 2022 20:52) (58 - 68)  RR: 16 (02 Aug 2022 05:30) (16 - 18)  SpO2: 100% (02 Aug 2022 05:30) (98% - 100%)    Parameters below as of 02 Aug 2022 05:30  Patient On (Oxygen Delivery Method): room air        PE  NAD  Awake, alert  Anicteric  limited 2/2 covid pandemic                          10.1   8.13  )-----------( 304      ( 02 Aug 2022 06:40 )             32.1       08-02    141  |  107  |  24<H>  ----------------------------<  83  4.1   |  26  |  1.31<H>    Ca    10.1      02 Aug 2022 06:40  Phos  3.3     08-02  Mg     2.5     08-02    TPro  7.4  /  Alb  2.6<L>  /  TBili  0.2  /  DBili  x   /  AST  11  /  ALT  13  /  AlkPhos  95  08-01

## 2022-08-02 NOTE — PROGRESS NOTE ADULT - ASSESSMENT
This is a 56 year old woman with bladder cancer. She appears to have developed hematuria in 2019 with a negative subsequent cystoscopy and PET/CT per patient previously. Then in 2020, she was found to have a bladder mass which was biopsied and [place] [facility] [place] due to [place]. She switched oncologist and currently follows with Dr Hoyt at [place]. She was most recently on carboplatin/gemcitabine, with last dose given in May 2022 (unable to get treatment due to recurrent hospitalizations since).     # Bladder cancer  - Unclear initial stage, appears to be metastatic  - Based on most recent CT scan from 7/31, consistent with progression of disease  - She continues to follow with Dr Hoyt at [place]  - Records/additional information from her primary oncologist will be helpful to complete assessment  - No plan to treat with chemotherapy during this admission  - Discussed potential next step and treatment options, which include further systemic therapy and immunotherapy  - If feasible, core needle biopsy of an accessible metastatic lesion (such as pulmonary or hepatic) for NGS testing may help determine treatment options. She is concerned that she may not have this done in a timely manner after her discharge. Will need to find out if she can have this done at [facility] with her primary oncologist. If not, then can consider having this done as outpt if she is otherwise ready for dc. If she is to stay admitted for some time, then would consider having this done here  - To follow up with primary oncologist Dr Hoyt upon discharge    bleeding --  and gyn eval noted, monitor for now    d/w pt and primary team. Will follow.

## 2022-08-02 NOTE — CONSULT NOTE ADULT - ASSESSMENT
56 Y o post menopausal female, with PMH of Anemia, Asthma, Bladder cancer (previously on chemo until 5/2022) with  Dr. Renay Trinidad, R nephrostomy tube, hyperparathyroidism, bradycardia with PPM, recent UTI admission 7/18, who comes in due to 3 day hx of gradual onset, worsening, constant 8/10 intermittent cramping B/l flank pain alleviated by tylenol worse with urination surrounding a clogged- non flushing nephrostomy tube,  a/w chills, fatigue. Patient also reports intermittent chronic hematuria, however today she reports large clots from the vagina. Has had loose stools no diarrhea, no burning or urinary frequency, no fevers, no chest pain no sob.  pt is with hx of SSS, s/p ppm with chest pain ?sec to metastatic disease, s/p recent negative stress test  bladder ca with mets i will speak to Dr Ceballos tomorrow  dvt prophylaxis  pain meds  iv hydration  will consider midodrine if decrease bp , symptomatic  nutrition  eval

## 2022-08-02 NOTE — CONSULT NOTE ADULT - SUBJECTIVE AND OBJECTIVE BOX
CHIEF COMPLAINT:Patient is a 56y old  Female who presents with a chief complaint of Abdominal pain (02 Aug 2022 15:57)      HPI:  56 Y o post menopausal female, with PMH of Anemia, Asthma, Bladder cancer (previously on chemo until 5/2022) with  Dr. Renay Trinidad, R nephrostomy tube, hyperparathyroidism, bradycardia with PPM, recent UTI admission 7/18, who comes in due to 3 day hx of gradual onset, worsening, constant 8/10 intermittent cramping B/l flank pain alleviated by tylenol worse with urination surrounding a clogged- non flushing nephrostomy tube,  a/w chills, fatigue. Patient also reports intermittent chronic hematuria, however today she reports large clots from the vagina. Has had loose stols, no diarrhea, no burning or urinary frequency, no fevers, no chest pain no sob.    In the ED HD stabe and AF  Exam: cachectic with moderate distress clutching the flanks, no CVA tenderness  Labs: Hgb 10.3 from 9.7, UA+Ve for Ketones and blood no white blood cells    CT abdomen and pelvis and CT chest show progression of metastatic disease and soft tissue density in the pelvis. Also shows retroperitoneal LAD    Admitted for management of Nephroostomy tube malfunction and for evaluation of post menopausal bleeding        PAST MEDICAL & SURGICAL HISTORY:  Anemia      Asthma      HLD (hyperlipidemia)      Pacemaker  [employer]      Bladder cancer      HTN (hypertension)      Parathyroid tumor      Liver cyst      Hydronephrosis  has right Nephrostomy tube - dressing intact      Bradycardia      History of renal calculi      H/O myomectomy      Presence of cardiac pacemaker      H/O hydronephrosis  s/p Right Perc nephrostomy tube placement 02/2021, exchange 06/2021          MEDICATIONS  (STANDING):  senna 2 Tablet(s) Oral at bedtime  sodium chloride 0.9%. 1000 milliLiter(s) (100 mL/Hr) IV Continuous <Continuous>    MEDICATIONS  (PRN):  acetaminophen     Tablet .. 325 milliGRAM(s) Oral every 4 hours PRN Temp greater or equal to 38C (100.4F), Mild Pain (1 - 3)  melatonin 3 milliGRAM(s) Oral at bedtime PRN Insomnia  morphine  IR 7.5 milliGRAM(s) Oral every 6 hours PRN Severe Pain (7 - 10)  polyethylene glycol 3350 17 Gram(s) Oral daily PRN Constipation      FAMILY HISTORY:  Family history of prostate cancer (Father)    Family history of CHF (congestive heart failure) (Father)    Family history of atrial fibrillation (Father)        SOCIAL HISTORY:    [ ] Non-smoker  [ ] Smoker  [ ] Alcohol    Allergies    No Known Allergies    Intolerances    	    REVIEW OF SYSTEMS:  CONSTITUTIONAL: No fever, weight loss, or fatigue  EYES: No eye pain, visual disturbances, or discharge  ENT:  No difficulty hearing, tinnitus, vertigo; No sinus or throat pain  NECK: No pain or stiffness  RESPIRATORY: No cough, wheezing, chills or hemoptysis; No Shortness of Breath  CARDIOVASCULAR: No chest pain, palpitations, passing out, dizziness, or leg swelling  GASTROINTESTINAL: No abdominal or epigastric pain. No nausea, vomiting, or hematemesis; No diarrhea or constipation. No melena or hematochezia.  GENITOURINARY: No dysuria, frequency, hematuria, or incontinence  NEUROLOGICAL: No headaches, memory loss, loss of strength, numbness, or tremors  SKIN: No itching, burning, rashes, or lesions   LYMPH Nodes: No enlarged glands  ENDOCRINE: No heat or cold intolerance; No hair loss  MUSCULOSKELETAL: No joint pain or swelling; No muscle, back, or extremity pain  PSYCHIATRIC: No depression, anxiety, mood swings, or difficulty sleeping  HEME/LYMPH: No easy bruising, or bleeding gums  ALLERGY AND IMMUNOLOGIC: No hives or eczema	    [ ] All others negative	  [ ] Unable to obtain    PHYSICAL EXAM:  T(C): 37.2 (08-02-22 @ 13:22), Max: 37.7 (08-01-22 @ 20:52)  HR: 66 (08-02-22 @ 13:22) (57 - 66)  BP: 98/54 (08-02-22 @ 13:22) (90/46 - 110/55)  RR: 16 (08-02-22 @ 13:22) (16 - 18)  SpO2: 100% (08-02-22 @ 13:22) (100% - 100%)  Wt(kg): --  I&O's Summary    01 Aug 2022 07:01  -  02 Aug 2022 07:00  --------------------------------------------------------  IN: 0 mL / OUT: 400 mL / NET: -400 mL    02 Aug 2022 07:01  -  02 Aug 2022 17:24  --------------------------------------------------------  IN: 0 mL / OUT: 400 mL / NET: -400 mL        Appearance: Normal	  HEENT:   Normal oral mucosa, PERRL, EOMI	  Lymphatic: No lymphadenopathy  Cardiovascular: Normal S1 S2, No JVD, No murmurs, No edema  Respiratory: Lungs clear to auscultation	  Psychiatry: A & O x 3, Mood & affect appropriate  Gastrointestinal:  Soft, Non-tender, + BS	  Skin: No rashes, No ecchymoses, No cyanosis	  Neurologic: Non-focal  Extremities: Normal range of motion, No clubbing, cyanosis or edema  Vascular: Peripheral pulses palpable 2+ bilaterally    TELEMETRY: 	    ECG:  	  RADIOLOGY:  OTHER: 	  	  LABS:	 	    CARDIAC MARKERS:                              10.1   8.13  )-----------( 304      ( 02 Aug 2022 06:40 )             32.1     08-02    141  |  110<H>  |  25<H>  ----------------------------<  97  4.8   |  26  |  1.19    Ca    9.2      02 Aug 2022 15:22  Phos  3.3     08-02  Mg     2.5     08-02    TPro  7.4  /  Alb  2.6<L>  /  TBili  0.2  /  DBili  x   /  AST  11  /  ALT  13  /  AlkPhos  95  08-01    proBNP:   Lipid Profile:   HgA1c:   TSH:       PREVIOUS DIAGNOSTIC TESTING:    < from: CT Abdomen and Pelvis No Cont (07.31.22 @ 14:38) >  Interval progression of metastatic disease in the chest.  Interval progression of right hepatic lesion.  Appearance of the urinary bladder is without significant change and is   likely from neoplasm. Urinary bladder is also inseparable from another   soft tissue density in the pelvis which may represent combination of   uterus or additional disease.  Retroperitoneal lymphadenopathy is without significant change.    - No acute urological intervention at this time.  - Consider chronic pain consult for management of malignancy related pain, if not well controlled  - Continue to f/u Heme onc recommendations for further work up of suspected metastatic bladder cancer and possible systemic chemo/immunotherapy.   - f/u urine culture, antibiotics per primary team  - Trend H/H, Cr  - Monitor gross hematuria/ retention  - Follow up with Dr. Trinidad, Urologist for outpatient management of bladder cancer. Office phone number to be included in discharge paperwork: [phone]

## 2022-08-02 NOTE — PROGRESS NOTE ADULT - PROBLEM SELECTOR PLAN 5
likely in the setting of bladder cancer  - urine cx negative  - s/p abx in the ed  - no indication to continue   -ID Dr. Rodriguez

## 2022-08-02 NOTE — PROGRESS NOTE ADULT - ASSESSMENT
Patient is a 56y old  Female with PMH of Anemia, Asthma, Bladder cancer (previously on chemo until 5/2022) with  Dr. Renay Trinidad, Right nephrostomy tube, hyperparathyroidism, bradycardia with PPM, recent UTI admission 7/18, now presents to the ER for evaluation of 3 days hx of gradual onset, worsening, constant 8/10 intermittent cramping B/l flank pain alleviated by tylenol worse with urination surrounding a clogged- non flushing nephrostomy tube. Patient also reports intermittent chronic hematuria, however today she reports large clots from the vagina. On admission, she has no fever, no leukocytosis and Urine analysis is negative.  The CT abdomen and pelvis and CT chest show progression of metastatic disease and soft tissue density in the pelvis. Also shows retroperitoneal LAD. The ID consult requested to assist with evaluation of UTI.     # UTI- in the setting of Right nephrostomy tube  # Post-menopausal bleeding  # Metastatic Bladder cancer    would recommend:    1. Monitor OFF Abx as urine  analysis is negative  2. Management of post-menopausal bleeding as per GYN  3. Pain management as needed  4. Management of Metastatic Bladder cancer as per Hem/Onc    d/w patient       Attending Attestation:    Spent more than 45 minutes on total encounter, more than 50 % of the visit was spent counseling and/or coordinating care by the Attending physician. Patient is a 56y old  Female with PMH of Anemia, Asthma, Bladder cancer (previously on chemo until 5/2022) with  Dr. Renay Trinidad, Right nephrostomy tube, hyperparathyroidism, bradycardia with PPM, recent UTI admission 7/18, now presents to the ER for evaluation of 3 days hx of gradual onset, worsening, constant 8/10 intermittent cramping B/l flank pain alleviated by tylenol worse with urination surrounding a clogged- non flushing nephrostomy tube. Patient also reports intermittent chronic hematuria, however today she reports large clots from the vagina. On admission, she has no fever, no leukocytosis and Urine analysis is negative.  The CT abdomen and pelvis and CT chest show progression of metastatic disease and soft tissue density in the pelvis. Also shows retroperitoneal LAD. The ID consult requested to assist with evaluation of UTI.     # UTI- in the setting of Right nephrostomy tube  # Post-menopausal bleeding  # Metastatic Bladder cancer    would recommend:    1. Please monitor BP and IVF boluses as needed to keep SBP >90  2. Monitor OFF Abx as urine  analysis is negative  3. Management of post-menopausal bleeding as per GYN  4. Pain management as needed  5. Management of Metastatic Bladder cancer as per Hem/Onc    d/w patient     Attending Attestation:    Spent more than 45 minutes on total encounter, more than 50 % of the visit was spent counseling and/or coordinating care by the Attending physician.

## 2022-08-02 NOTE — CONSULT NOTE ADULT - SUBJECTIVE AND OBJECTIVE BOX
Source of information: [facility], Chart review  Patient language: English  : n/a    HPI:  56 Y o post menopausal female, with PMH of Anemia, Asthma, Bladder cancer (previously on chemo until 2022) with  Dr. Renay Trinidad, R nephrostomy tube, hyperparathyroidism, bradycardia with PPM, recent UTI admission , who comes in due to 3 day hx of gradual onset, worsening, constant 8/10 intermittent cramping B/l flank pain alleviated by tylenol worse with urination surrounding a clogged- non flushing nephrostomy tube,  a/w chills, fatigue. Patient also reports intermittent chronic hematuria, however today she reports large clots from the vagina. Has had loose stols, no diarrhea, no burning or urinary frequency, no fevers, no chest pain no sob.    In the ED HD stabe and AF  Exam: cachectic with moderate distress clutching the flanks, no CVA tenderness  Labs: Hgb 10.3 from 9.7, UA+Ve for Ketones and blood no white blood cells    CT abdomen and pelvis and CT chest show progression of metastatic disease and soft tissue density in the pelvis. Also shows retroperitoneal LAD    Admitted for management of Nephroostomy tube malfunction and for evaluation of post menopausal bleeding   (01 Aug 2022 03:15)    Pain consulted for abdominal pain. Pt seen and examined at bedside. Reports abdominal and b/l flank pain score 5/10 and tolerable  SCALE USED: (1-10 VNRS). Pt describes pain as aching, occasional spasms, radiating to b/l flank, alleviated by pain medication and after flushing nephrostomy tube, exacerbated by movement and palpation. Pt hesitant on taking stronger pain medications other than tylenol, however reports she will take PO morphine if her pain becomes severe. Pt also reports mild frontal headache. Denies dizziness, lightheadedness, changes in vision, hearing. Pt tolerating PO diet, reports poor appetite. Reports lethargy. Denies chest pain, SOB, nausea, vomiting, constipation. Reports last BM . Patient stated goal for pain control: to be able to take deep breaths, get out of bed to chair and ambulate with tolerable pain control. Pt ambulating to the bathroom with tolerable pain. Pt denies taking medications for pain at home.     PAST MEDICAL & SURGICAL HISTORY:  Anemia      Asthma      HLD (hyperlipidemia)      Pacemaker  [employer]      Bladder cancer      HTN (hypertension)      Parathyroid tumor      Liver cyst      Hydronephrosis  has right Nephrostomy tube - dressing intact      Bradycardia      History of renal calculi      H/O myomectomy      Presence of cardiac pacemaker      H/O hydronephrosis  s/p Right Perc nephrostomy tube placement 2021, exchange 2021          FAMILY HISTORY:  Family history of prostate cancer (Father)    Family history of CHF (congestive heart failure) (Father)    Family history of atrial fibrillation (Father)        Social History:  Patient lives at home   does not smoke, drink or use drugs (01 Aug 2022 03:15)   [ ] Denies ETOH use, illicit drug use and smoking    Allergies    No Known Allergies    Intolerances        MEDICATIONS  (STANDING):  senna 2 Tablet(s) Oral at bedtime  sodium chloride 0.9%. 1000 milliLiter(s) (100 mL/Hr) IV Continuous <Continuous>    MEDICATIONS  (PRN):  acetaminophen     Tablet .. 325 milliGRAM(s) Oral every 4 hours PRN Temp greater or equal to 38C (100.4F), Mild Pain (1 - 3)  melatonin 3 milliGRAM(s) Oral at bedtime PRN Insomnia  morphine  IR 7.5 milliGRAM(s) Oral every 6 hours PRN Severe Pain (7 - 10)      Vital Signs Last 24 Hrs  T(C): 37.2 (02 Aug 2022 13:22), Max: 37.7 (01 Aug 2022 20:52)  T(F): 99 (02 Aug 2022 13:22), Max: 99.9 (01 Aug 2022 20:52)  HR: 66 (02 Aug 2022 13:22) (57 - 66)  BP: 98/54 (02 Aug 2022 13:22) (90/46 - 110/55)  BP(mean): 58 (01 Aug 2022 20:52) (58 - 68)  RR: 16 (02 Aug 2022 13:22) (16 - 18)  SpO2: 100% (02 Aug 2022 13:22) (98% - 100%)    Parameters below as of 02 Aug 2022 13:22  Patient On (Oxygen Delivery Method): room air        LABS: Reviewed.                          10.1   8.13  )-----------( 304      ( 02 Aug 2022 06:40 )             32.1     08-02    141  |  107  |  24<H>  ----------------------------<  83  4.1   |  26  |  1.31<H>    Ca    10.1      02 Aug 2022 06:40  Phos  3.3     08-  Mg     2.5     08-    TPro  7.4  /  Alb  2.6<L>  /  TBili  0.2  /  DBili  x   /  AST  11  /  ALT  13  /  AlkPhos  95  08-      LIVER FUNCTIONS - ( 01 Aug 2022 06:35 )  Alb: 2.6 g/dL / Pro: 7.4 g/dL / ALK PHOS: 95 U/L / ALT: 13 U/L DA / AST: 11 U/L / GGT: x           Urinalysis Basic - ( 01 Aug 2022 07:34 )    Color: Yellow / Appearance: Slightly Turbid / S.015 / pH: x  Gluc: x / Ketone: Negative  / Bili: Negative / Urobili: Negative   Blood: x / Protein: 500 mg/dL / Nitrite: Negative   Leuk Esterase: Small / RBC: 25-50 /HPF / WBC 3-5 /HPF   Sq Epi: x / Non Sq Epi: Few /HPF / Bacteria: Few /HPF      CAPILLARY BLOOD GLUCOSE        SARS-CoV-2: NotDetec (2022 10:35)  COVID-19 PCR: NotDetec (2022 05:09)  COVID-19 PCR: NotDetec (2022 06:00)  COVID-19 PCR: NotDetec (2022 18:11)  SARS-CoV-2: NotDetec (2022 20:56)  COVID-19 PCR: NotDetec (04 May 2022 11:56)  COVID-19 PCR: NotDetec (23 Mar 2022 14:48)  COVID-19 PCR: NotDetec (08 Mar 2022 19:08)  COVID-19 PCR: NotDetec (02 Mar 2022 15:54)  SARS-CoV-2: NotDetec (2022 18:37)      Radiology: Reviewed.   < from: CT Chest No Cont (22 @ 14:37) >    ACC: [id number] EXAM:  CT ABDOMEN AND PELVIS                        ACC: [id number] EXAM:  CT CHEST                          PROCEDURE DATE:  2022          INTERPRETATION:  CLINICAL INFORMATION: Chest pain, lower abdominal pain,   vaginal bleeding. History of sarcoma.    COMPARISON: 2022.    CONTRAST/COMPLICATIONS:  IV Contrast: NONE  Oral Contrast: NONE  Complications: None reported at time of study completion    PROCEDURE:  CT of the Chest, Abdomen and Pelvis was performed.  Sagittal and coronal reformats were performed.    FINDINGS:  CHEST:  LUNGS AND LARGE AIRWAYS: Patent central airways. Multiple bilateral   pulmonary nodules, a representative nodule in the left lower lobe   measures 2.5 x 1.9 cm. A few of the nodules also demonstrate cavitation.   Overall interval increase since recent CT of 2022.  PLEURA: No pleural effusion.  VESSELS: Within normal limits.  HEART: Heart size is normal. No pericardial effusion. ICD leads in the   right atrium and right medical.  MEDIASTINUM AND FLORENTIN: No lymphadenopathy.  CHEST WALL AND LOWER NECK: Within normal limits.    ABDOMEN AND PELVIS:  LIVER: A heterogeneous low-density mass of 2 cm in segment 7, previously   1.3 cm.  BILE DUCTS: Normal caliber.  GALLBLADDER: Within normal limits.  SPLEEN: Within normal limits.  PANCREAS: Within normal limits.  ADRENALS: Within normal limits.  KIDNEYS/URETERS: Right percutaneous nephrostomy catheter. 4 mm   nonobstructing stone in the lower pole. Left kidney is markedly enlarged   with hydronephrosis and cortical thinning, likely chronic. Left ureter is   dilated to the level of the urinary bladder.    BLADDER/REPRODUCTIVE ORGANS: Marked mural thickening of the urinary   bladder which is inseparable from a soft tissue density which may   represent combination of uterus and additional neoplasm.    BOWEL: No bowel obstruction. Appendix is normal. Moderate to large stool.  PERITONEUM: No ascites.  VESSELS: Within normal limits.  RETROPERITONEUM/LYMPH NODES: Retroperitoneal lymphadenopathy, a   representative node of 1.5 x 2.3 cm (2, 141).  ABDOMINAL WALL: Within normal limits.  BONES: Within normal limits.    IMPRESSION:  Interval progression of metastatic disease in the chest.  Interval progression of right hepatic lesion.  Appearance of the urinary bladder is without significant change and is   likely from neoplasm. Urinary bladder is also inseparable from another   soft tissue density in the pelvis which may represent combination of   uterus or additional disease.  Retroperitoneal lymphadenopathy is without significant change.    --- End of Report ---            ALLISON SOLORZANO MD; Attending Radiologist  This document has been electronically signed. 2022  3:00PM    < end of copied text >    < from: CT Abdomen and Pelvis No Cont (22 @ 14:38) >  ACC: [id number] EXAM:  CT ABDOMEN AND PELVIS                        ACC: [id number] EXAM:  CT CHEST                          PROCEDURE DATE:  2022          INTERPRETATION:  CLINICAL INFORMATION: Chest pain, lower abdominal pain,   vaginal bleeding. History of sarcoma.    COMPARISON: 2022.    CONTRAST/COMPLICATIONS:  IV Contrast: NONE  Oral Contrast: NONE  Complications: None reported at time of study completion    PROCEDURE:  CT of the Chest, Abdomen and Pelvis was performed.  Sagittal and coronal reformats were performed.    FINDINGS:  CHEST:  LUNGS AND LARGE AIRWAYS: Patent central airways. Multiple bilateral   pulmonary nodules, a representative nodule in the left lower lobe   measures 2.5 x 1.9 cm. A few of the nodules also demonstrate cavitation.   Overall interval increase since recent CT of 2022.  PLEURA: No pleural effusion.  VESSELS: Within normal limits.  HEART: Heart size is normal. No pericardial effusion. ICD leads in the   right atrium and right medical.  MEDIASTINUM AND FLORENTIN: No lymphadenopathy.  CHEST WALL AND LOWER NECK: Within normal limits.    ABDOMEN AND PELVIS:  LIVER: A heterogeneous low-density mass of 2 cm in segment 7, previously   1.3 cm.  BILE DUCTS: Normal caliber.  GALLBLADDER: Within normal limits.  SPLEEN: Within normal limits.  PANCREAS: Within normal limits.  ADRENALS: Within normal limits.  KIDNEYS/URETERS: Right percutaneous nephrostomy catheter. 4 mm   nonobstructing stone in the lower pole. Left kidney is markedly enlarged   with hydronephrosis and cortical thinning, likely chronic. Left ureter is   dilated to the level of the urinary bladder.    BLADDER/REPRODUCTIVE ORGANS: Marked mural thickening of the urinary   bladder which is inseparable from a soft tissue density which may   represent combination of uterus and additional neoplasm.    BOWEL: No bowel obstruction. Appendix is normal. Moderate to large stool.  PERITONEUM: No ascites.  VESSELS: Within normal limits.  RETROPERITONEUM/LYMPH NODES: Retroperitoneal lymphadenopathy, a   representative node of 1.5 x 2.3 cm (2, 141).  ABDOMINAL WALL: Within normal limits.  BONES: Within normal limits.    IMPRESSION:  Interval progression of metastatic disease in the chest.  Interval progression of right hepatic lesion.  Appearance of the urinary bladder is without significant change and is   likely from neoplasm. Urinary bladder is also inseparable from another   soft tissue density in the pelvis which may represent combination of   uterus or additional disease.  Retroperitoneal lymphadenopathy is without significant change.    --- End of Report ---            ALLISON SOLORZANO MD; Attending Radiologist  This document has been electronically signed. 2022  3:00PM    < end of copied text >      ORT Score -   Family Hx of substance abuse	Female	      Male  Alcohol 	                                           1                     3  Illegal drugs	                                   2                     3  Rx drugs                                           4 	                  4  Personal Hx of substance abuse		  Alcohol 	                                          3	                  3  Illegal drugs                                     4	                  4  Rx drugs                                            5 	                  5  Age between 16- 45 years	           1                     1  hx preadolescent sexual abuse	   3 	                  0  Psychological disease		  ADD, OCD, bipolar, schizophrenia   2	          2  Depression                                           1 	          1  Total: 0    a score of 3 or lower indicates low risk for opioid abuse		  a score of 4-7 indicates moderate risk for opioid abuse		  a score of 8 or higher indicates high risk for opioid abuse  	  REVIEW OF SYSTEMS:  CONSTITUTIONAL: No fever + fatigue  HEENT: + frontal headache, No difficulty hearing, no change in vision  NECK: No pain or stiffness  RESPIRATORY: No cough, wheezing, chills or hemoptysis; No shortness of breath  CARDIOVASCULAR: No chest pain, palpitations, dizziness, or leg swelling  GASTROINTESTINAL: + loss of appetite, decreased PO intake. + abdominal pain, occasional spasms. No nausea, vomiting; No diarrhea or constipation.   GENITOURINARY: + right nephrostomy tube. No dysuria, frequency, hematuria, retention or incontinence  MUSCULOSKELETAL: + b/l flank pain. No joint swelling; no upper or lower motor strength weakness, no saddle anesthesia, bowel/bladder incontinence, no falls   NEURO: No headaches, No numbness/tingling b/l LE, No weakness    PHYSICAL EXAM:  GENERAL:  Alert & Oriented X4, cooperative, NAD, Good concentration. Speech is clear.   RESPIRATORY: Respirations even and unlabored. Clear to auscultation bilaterally; No rales, rhonchi, wheezing, or rubs  CARDIOVASCULAR: Normal S1/S2, regular rate and rhythm; No murmurs, rubs, or gallops. No JVD.   GASTROINTESTINAL:  Soft, + generalized tenderness, Nondistended; Bowel sounds present  GENITOURINARY: right nephrostomy tube draining clear yellow urine to bag.   PERIPHERAL VASCULAR:  Extremities warm without edema. 2+ Peripheral Pulses, No cyanosis, No calf tenderness  MUSCULOSKELETAL: Motor Strength 5/5 B/L upper and lower extremities; moves all extremities equally against gravity; ROM intact;  + b/l flank tenderness on palpation.    SKIN: + right nephrostomy dressing c/d/i; Warm, dry, intact. No rashes, lesions, scars or wounds.     Risk factors associated with adverse outcomes related to opioid treatment  [ ]  Concurrent benzodiazepine use  [ ]  History/ Active substance use or alcohol use disorder  [ ] Psychiatric co-morbidity  [ ] Sleep apnea  [ ] COPD  [ ] BMI> 35  [ ] Liver dysfunction  [X ] Renal dysfunction  [ ] CHF  [ ] Smoker  [ ]  Age > 60 years    [X ]  [place]  Reviewed and Copied to Chart. See below.    Plan of care and goal oriented pain management treatment options were discussed with patient and /or primary care giver; all questions and concerns were addressed and care was aligned with patient's wishes.    Educated patient on goal oriented pain management treatment options

## 2022-08-02 NOTE — PATIENT PROFILE ADULT - NSPROHMDIABETMGMTSTRAT_GEN_A_NUR
Body Location Override (Optional - Billing Will Still Be Based On Selected Body Map Location If Applicable): left upper back Detail Level: Simple Depth Of Biopsy: dermis Was A Bandage Applied: Yes Size Of Lesion In Cm: 1.1 X Size Of Lesion In Cm: 0 Biopsy Type: H and E Biopsy Method: Personna blade Anesthesia Type: 1% lidocaine with 1:100,000 epinephrine and a 1:10 solution of 8.4% sodium bicarbonate Anesthesia Volume In Cc (Will Not Render If 0): 0.5 Hemostasis: Aluminum Chloride Wound Care: Vaseline Dressing: pressure dressing with telfa Destruction After The Procedure: No Type Of Destruction Used: Electrodesiccation Curettage Text: The wound bed was treated with curettage after the biopsy was performed. Cryotherapy Text: The wound bed was treated with cryotherapy after the biopsy was performed. Electrodesiccation Text: The wound bed was treated with electrodesiccation after the biopsy was performed. Electrodesiccation And Curettage Text: The wound bed was treated with electrodesiccation and curettage after the biopsy was performed. Silver Nitrate Text: The wound bed was treated with silver nitrate after the biopsy was performed. Lab: [facility] Facility: [address] Notes (To The Dermatopathologist): 1.1 cm Consent: The provider's intent is to obtain a tissue sample solely for diagnostic purposes. Written consent to obtain tissue sample was obtained and risks were reviewed including but not limited to scarring, infection, bleeding, scabbing, incomplete removal, nerve damage and allergy to anesthesia. Post-Care Instructions: I reviewed with the patient in detail post-care instructions. Patient is to keep the biopsy site dry overnight, and then apply Vaseline and a bandaid twice daily until healed. Notification Instructions: Patient will be notified of biopsy results. However, patient instructed to call the office if not contacted within 2 weeks. Billing Type: [employer] Information: Selecting Yes will display possible errors in your note based on the variables you have selected. This validation is only offered as a suggestion for you. PLEASE NOTE THAT THE VALIDATION TEXT WILL BE REMOVED WHEN YOU FINALIZE YOUR NOTE. IF YOU WANT TO FAX A PRELIMINARY NOTE YOU WILL NEED TO TOGGLE THIS TO 'NO' IF YOU DO NOT WANT IT IN YOUR FAXED NOTE. blood glucose testing

## 2022-08-02 NOTE — PROGRESS NOTE ADULT - ASSESSMENT
meka nd examined vsstable afebrile physical done   covering for Dr Ochoa   h/o metastatic bladder cancer  CT Chest showed  worsening of mets  also lesion in liver    hematurea  and vaginal bleed  urology and GYN   BP on boarderline  extra water

## 2022-08-03 NOTE — PROGRESS NOTE ADULT - ASSESSMENT
Patient is a 56y old  Female with PMH of Anemia, Asthma, Bladder cancer (previously on chemo until 5/2022) with  Dr. Renay Trinidad, Right nephrostomy tube, hyperparathyroidism, bradycardia with PPM, recent UTI admission 7/18, now presents to the ER for evaluation of 3 days hx of gradual onset, worsening, constant 8/10 intermittent cramping B/l flank pain alleviated by tylenol worse with urination surrounding a clogged- non flushing nephrostomy tube. Patient also reports intermittent chronic hematuria, however today she reports large clots from the vagina. On admission, she has no fever, no leukocytosis and Urine analysis is negative.  The CT abdomen and pelvis and CT chest show progression of metastatic disease and soft tissue density in the pelvis. Also shows retroperitoneal LAD. The ID consult requested to assist with evaluation of UTI.     # UTI- in the setting of Right nephrostomy tube  # Post-menopausal bleeding  # Metastatic Bladder cancer    would recommend:    1. Please monitor BP and IVF boluses as needed to keep SBP >90  2. Monitor OFF Abx as urine  analysis is negative  3. Management of post-menopausal bleeding as per GYN  4. Pain management as needed  5. Management of Metastatic Bladder cancer as per Hem/Onc    d/w patient     Attending Attestation:    Spent more than 45 minutes on total encounter, more than 50 % of the visit was spent counseling and/or coordinating care by the Attending physician.   Patient is a 56y old  Female with PMH of Anemia, Asthma, Bladder cancer (previously on chemo until 5/2022) with  Dr. Renay Trinidad, Right nephrostomy tube, hyperparathyroidism, bradycardia with PPM, recent UTI admission 7/18, now presents to the ER for evaluation of 3 days hx of gradual onset, worsening, constant 8/10 intermittent cramping B/l flank pain alleviated by tylenol worse with urination surrounding a clogged- non flushing nephrostomy tube. Patient also reports intermittent chronic hematuria, however today she reports large clots from the vagina. On admission, she has no fever, no leukocytosis and Urine analysis is negative.  The CT abdomen and pelvis and CT chest show progression of metastatic disease and soft tissue density in the pelvis. Also shows retroperitoneal LAD. The ID consult requested to assist with evaluation of UTI.     # UTI- in the setting of Right nephrostomy tube which draining clear urine  # Post-menopausal bleeding  # Metastatic Bladder cancer    would recommend:    1. OOB to chair   2. Monitor OFF Abx as urine  analysis is negative  3. Management of post-menopausal bleeding as per GYN  4. Pain management as needed  5. Management of Metastatic Bladder cancer as per Hem/Onc    Attending Attestation:    Spent more than 35 minutes on total encounter, more than 50 % of the visit was spent counseling and/or coordinating care by the Attending physician.

## 2022-08-03 NOTE — DISCHARGE NOTE PROVIDER - NSDCFUSCHEDAPPT_GEN_ALL_CORE_FT
Arabella Esqueda  [facility] Physician [facility]  UROLOGY [address] [facility]  Scheduled Appointment: 08/12/2022

## 2022-08-03 NOTE — DISCHARGE NOTE NURSING/CASE MANAGEMENT/SOCIAL WORK - NSDCPEFALRISK_GEN_ALL_CORE
For information on Fall & Injury Prevention, visit: [email or web address] OR  [email or web address] OR  https://www.cdc.gov/steadi/patient.html

## 2022-08-03 NOTE — PROGRESS NOTE ADULT - PROBLEM SELECTOR PLAN 1
-likely 2/2 to sequale of mass effect of bladder cancer (L)  - CT abdomen: KIDNEYS/URETERS: Right percutaneous nephrostomy catheter. 4 mm   nonobstructing  stone in the lower pole.  - Left kidney is markedly enlarged   with hydronephrosis and cortical thinning, likely chronic. Left ureter is   dilated to the level of the urinary bladder.  - s/p IV abx, off abx now- asymptomatic  - no acte intervention from urology  - ID following
Pt with chronic abdominal and b/l flank pain which is somatic and neuropathic in nature due to metastatic bladder CA and right nephrostomy tube. Pt also with occasional headache.   Opioid pain recommendations   - Continue Morphine 7.5mg PO q 6 hours PRN severe pain. Monitor for sedation/ respiratory depression.   Non-opioid pain recommendations   - Avoid NSIADs- CRYSTAL and pt with solitary kidney  - Continue Acetaminophen 325 mg PO q 4 hours PRN moderate pain. Monitor LFT (dose per pt request)   Bowel Regimen  - Miralax 17G PO daily PRN constipation  - Senna 2 tablets at bedtime for constipation  Mild pain   - Non-pharmacological pain treatment recommendations  - Warm/ Cool packs PRN   - Repositioning, imagery, relaxation, distraction.  - Physical therapy OOB if no contraindications   Recommendations discussed with primary team and RN.

## 2022-08-03 NOTE — PROGRESS NOTE ADULT - PROVIDER SPECIALTY LIST ADULT
Heme/Onc
Internal Medicine
Heme/Onc
Infectious Disease
Infectious Disease
Internal Medicine
Pain Medicine
Internal Medicine

## 2022-08-03 NOTE — DISCHARGE NOTE PROVIDER - NSDCCPCAREPLAN_GEN_ALL_CORE_FT
PRINCIPAL DISCHARGE DIAGNOSIS  Diagnosis: Nephrostomy complication  Assessment and Plan of Treatment: YOu presented with YOur nephtube getting clogged. You were seen by [facility] from IR. It was flushed and has been drain. YOu will need to follow up with IR in  week to follow up. It will likely need to be chaged.   Please also follow up with you PCP and oncologist      SECONDARY DISCHARGE DIAGNOSES  Diagnosis: At risk for UTI related to indwelling catheter  Assessment and Plan of Treatment: YOu are risk for urinary tract infections due to having catheter, you were given IV antibitics in ed when you initially arrived to [facility] complication due to infections. YOu were also seen by infectious disease and you have not required any extended lenth of antibiotics.  Please continue to are for your nephrostomy tube in aseptic techinique.    Diagnosis: Malignant neoplasm metastatic from bladder  Assessment and Plan of Treatment: YOu were offered for bx here with ct scan. You have decided to do PET scan as out patient and you have collaboraed with your PCP Dr Rosario. YOu PLese follow up wiht your PCP

## 2022-08-03 NOTE — PROGRESS NOTE ADULT - ASSESSMENT
M E D I C A L   A T T E N D I N G    F O L L O W    U P   N O T E  (08-03-22 )                                     ------------------------------------------------------------------------------------------------    patient evaluated by me, case discussed with team, chart, medications, and physical exam reviewed, labs / tests  and vitals reviewed by me, as bellow.   Patient is stable for discharge today. pt decided to follow up as OP with a PET valle and further management ..   Patient to follow up with  PMD, oncology, IR  See discharge document for full note.  Greater than 35 min spent for these services.                              ( note written / Date of service  08-03-22 )    ==================>> MEDICATIONS <<====================    senna 2 Tablet(s) Oral at bedtime  sodium chloride 0.9%. 1000 milliLiter(s) IV Continuous <Continuous>    MEDICATIONS  (PRN):  acetaminophen     Tablet .. 325 milliGRAM(s) Oral every 4 hours PRN Temp greater or equal to 38C (100.4F), Mild Pain (1 - 3)  melatonin 3 milliGRAM(s) Oral at bedtime PRN Insomnia  morphine  IR 7.5 milliGRAM(s) Oral every 6 hours PRN Severe Pain (7 - 10)  polyethylene glycol 3350 17 Gram(s) Oral daily PRN Constipation    ___________  Active diet:  Diet, Regular  ___________________    ==================>> VITAL SIGNS <<==================    T(C): 36.7 (08-03-22 @ 13:51), Max: 36.7 (08-03-22 @ 13:51)  HR: 70 (08-03-22 @ 13:51) (53 - 70)  BP: 104/64 (08-03-22 @ 13:51) (102/63 - 122/66)  BP(mean): 84 (08-03-22 @ 05:33)  RR: 17 (08-03-22 @ 13:51) (17 - 18)  SpO2: 98% (08-03-22 @ 13:51) (98% - 100%)     I&O's Summary    02 Aug 2022 07:01  -  03 Aug 2022 07:00  --------------------------------------------------------  IN: 0 mL / OUT: 400 mL / NET: -400 mL    03 Aug 2022 07:01  -  03 Aug 2022 15:48  --------------------------------------------------------  IN: 0 mL / OUT: 400 mL / NET: -400 mL       ==================>> LAB AND IMAGING <<==================                        10.1   8.13  )-----------( 304      ( 02 Aug 2022 06:40 )             32.1        08-02    141  |  110<H>  |  25<H>  ----------------------------<  97  4.8   |  26  |  1.19    Ca    9.2      02 Aug 2022 15:22  Phos  3.3     08-02  Mg     2.5     08-02    HgA1C:   (06-29-22)          (06-29-22)      5.6,   (06-28-22)          (06-28-22)      5.7  WBC count:   8.13 <<== ,  7.14 <<== ,  8.42 <<==   Hemoglobin:   10.1 <<==,  10.1 <<==,  10.3 <<==  platelets:  304 <==, 316 <==, 302 <==    Creatinine:  1.19  <<==, 1.31  <<==, 1.09  <<==, 1.15  <<==  Sodium:   141  <==, 141  <==, 142  <==, 139  <==       AST:          11 <== , 15 <==      ALT:        13  <== , 13  <==      AP:        95  <=, 95  <=     Bili:        0.2  <=, 0.2  <=

## 2022-08-03 NOTE — DISCHARGE NOTE PROVIDER - NSDCMRMEDTOKEN_GEN_ALL_CORE_FT
ATORVASTATIN 40 MG TABLET: each orally once a day (at bedtime)  OLANZAPINE 5MG TAB: tab(s) orally once a day (at bedtime)  SYMBICORT 80-4.5 MCG INHALER: gram(s) inhaled 2 times a day  VITAMIN D3 25 MCG TABLET: each orally once a day   ATORVASTATIN 40 MG TABLET: each orally once a day (at bedtime)  morphine 15 mg oral tablet: 0.5 tab(s) orally every 6 hours MDD:4  OLANZAPINE 5MG TAB: tab(s) orally once a day (at bedtime)  polyethylene glycol 3350 oral powder for reconstitution: 17 gram(s) orally once a day, As needed, Constipation  SYMBICORT 80-4.5 MCG INHALER: gram(s) inhaled 2 times a day  VITAMIN D3 25 MCG TABLET: each orally once a day

## 2022-08-03 NOTE — PROGRESS NOTE ADULT - ASSESSMENT
Confidential Drug Utilization Report  Search Terms: Zoë Bell, 1965Search Date: 08/02/2022 15:05:16 PM  The Drug Utilization Report below displays all of the controlled substance prescriptions, if any, that your patient has filled in the last twelve months. The information displayed on this report is compiled from pharmacy submissions to the Department, and accurately reflects the information as submitted by the pharmacies.    This report was requested by: Pat Mora | Reference #: [id number]    There are no results for the search terms that you entered.

## 2022-08-03 NOTE — DISCHARGE NOTE PROVIDER - NSDCCAREPROVSEEN_GEN_ALL_CORE_FT
Candelario Ochoa Eddie Chirinos  [facility]  Wahib Zafar Daniel Kyung Mohsena Amin Helen Shum Jane Cho Alexander Lucks Sherilyn Valiente  User ADM  [facility]  [place]  [facility]      [ Greater than 35 min spent for discharge services.   LUIS ANGEL Ochoa ]

## 2022-08-03 NOTE — PROGRESS NOTE ADULT - SUBJECTIVE AND OBJECTIVE BOX
Source of information: [facility], Chart review  Patient language: English  : n/a    HPI:  56 Y o post menopausal female, with PMH of Anemia, Asthma, Bladder cancer (previously on chemo until 5/2022) with  Dr. Reany Trinidad, R nephrostomy tube, hyperparathyroidism, bradycardia with PPM, recent UTI admission 7/18, who comes in due to 3 day hx of gradual onset, worsening, constant 8/10 intermittent cramping B/l flank pain alleviated by tylenol worse with urination surrounding a clogged- non flushing nephrostomy tube,  a/w chills, fatigue. Patient also reports intermittent chronic hematuria, however today she reports large clots from the vagina. Has had loose stols, no diarrhea, no burning or urinary frequency, no fevers, no chest pain no sob.    In the ED HD stabe and AF  Exam: cachectic with moderate distress clutching the flanks, no CVA tenderness  Labs: Hgb 10.3 from 9.7, UA+Ve for Ketones and blood no white blood cells    CT abdomen and pelvis and CT chest show progression of metastatic disease and soft tissue density in the pelvis. Also shows retroperitoneal LAD    Admitted for management of Nephroostomy tube malfunction and for evaluation of post menopausal bleeding   (01 Aug 2022 03:15)    Pain consulted for abdominal pain. Pt seen and examined at bedside with medicine NP. Reports abdominal and b/l flank pain score 5-6/10 and tolerable  SCALE USED: (1-10 VNRS). Pt describes pain as aching, occasional spasms, radiating to b/l flank, alleviated by pain medication and after flushing nephrostomy tube, exacerbated by movement and palpation. Reports she needed to flush her nephrostomy tube several times this morning and wants to speak to IR. Medicine NP will reach out. Pt hesitant on taking stronger pain medications other than tylenol, however reports she will take PO morphine if her pain becomes severe. Pt also reports occasional frontal headache. Denies dizziness, lightheadedness, changes in vision, hearing. Pt tolerating PO diet, reports poor appetite. Reports lethargy. Denies chest pain, SOB, nausea, vomiting, constipation. Reports last BM 8/3. Patient stated goal for pain control: to be able to take deep breaths, get out of bed to chair and ambulate with tolerable pain control. Pt ambulating to the bathroom with tolerable pain. Pt denies taking medications for pain at home. States she is willing to be discharged home today after nephrostomy tube assessed by IR and to f/u OP for possible PET scan. PT verbalized anxiety, emotional support provided.     PAST MEDICAL & SURGICAL HISTORY:  Anemia      Asthma      HLD (hyperlipidemia)      Pacemaker  [facility] [facility]      Bladder cancer      HTN (hypertension)      Parathyroid tumor      Liver cyst      Hydronephrosis  has right Nephrostomy tube - dressing intact      Bradycardia      History of renal calculi      H/O myomectomy      Presence of cardiac pacemaker      H/O hydronephrosis  s/p Right Perc nephrostomy tube placement 02/2021, exchange 06/2021          FAMILY HISTORY:  Family history of prostate cancer (Father)    Family history of CHF (congestive heart failure) (Father)    Family history of atrial fibrillation (Father)        Social History:  Patient lives at home   does not smoke, drink or use drugs (01 Aug 2022 03:15)    Allergies    No Known Allergies      MEDICATIONS  (STANDING):  senna 2 Tablet(s) Oral at bedtime  sodium chloride 0.9%. 1000 milliLiter(s) (100 mL/Hr) IV Continuous <Continuous>    MEDICATIONS  (PRN):  acetaminophen     Tablet .. 325 milliGRAM(s) Oral every 4 hours PRN Temp greater or equal to 38C (100.4F), Mild Pain (1 - 3)  melatonin 3 milliGRAM(s) Oral at bedtime PRN Insomnia  morphine  IR 7.5 milliGRAM(s) Oral every 6 hours PRN Severe Pain (7 - 10)  polyethylene glycol 3350 17 Gram(s) Oral daily PRN Constipation      Vital Signs Last 24 Hrs  T(C): 36.4 (03 Aug 2022 05:33), Max: 37.2 (02 Aug 2022 13:22)  T(F): 97.6 (03 Aug 2022 05:33), Max: 99 (02 Aug 2022 13:22)  HR: 53 (03 Aug 2022 05:33) (53 - 66)  BP: 122/66 (03 Aug 2022 05:33) (98/54 - 122/66)  BP(mean): 84 (03 Aug 2022 05:33) (76 - 84)  RR: 18 (03 Aug 2022 05:33) (16 - 18)  SpO2: 100% (03 Aug 2022 05:33) (98% - 100%)    Parameters below as of 03 Aug 2022 05:33  Patient On (Oxygen Delivery Method): room air      SARS-CoV-2: NotDetec (31 Jul 2022 10:35)  COVID-19 PCR: NotDetec (18 Jul 2022 05:09)  COVID-19 PCR: NotDetec (04 Jul 2022 06:00)  COVID-19 PCR: NotDetec (27 Jun 2022 18:11)  SARS-CoV-2: NotDetec (09 Jun 2022 20:56)  COVID-19 PCR: NotDetec (04 May 2022 11:56)  COVID-19 PCR: NotDetec (23 Mar 2022 14:48)  COVID-19 PCR: NotDetec (08 Mar 2022 19:08)  COVID-19 PCR: NotDetec (02 Mar 2022 15:54)  SARS-CoV-2: NotDetec (23 Feb 2022 18:37)    LABS: Reviewed                          10.1   8.13  )-----------( 304      ( 02 Aug 2022 06:40 )             32.1     08-02    141  |  110<H>  |  25<H>  ----------------------------<  97  4.8   |  26  |  1.19    Ca    9.2      02 Aug 2022 15:22  Phos  3.3     08-02  Mg     2.5     08-02            CAPILLARY BLOOD GLUCOSE        SARS-CoV-2: NotDetec (31 Jul 2022 10:35)  COVID-19 PCR: NotDetec (18 Jul 2022 05:09)  COVID-19 PCR: NotDetec (04 Jul 2022 06:00)  COVID-19 PCR: NotDetec (27 Jun 2022 18:11)  SARS-CoV-2: NotDetec (09 Jun 2022 20:56)  COVID-19 PCR: NotDetec (04 May 2022 11:56)  COVID-19 PCR: NotDetec (23 Mar 2022 14:48)  COVID-19 PCR: NotDetec (08 Mar 2022 19:08)  COVID-19 PCR: NotDetec (02 Mar 2022 15:54)  SARS-CoV-2: NotDetec (23 Feb 2022 18:37)    Radiology: Reviewed.   < from: CT Chest No Cont (07.31.22 @ 14:37) >    ACC: [id number] EXAM:  CT ABDOMEN AND PELVIS                        ACC: [id number] EXAM:  CT CHEST                          PROCEDURE DATE:  07/31/2022          INTERPRETATION:  CLINICAL INFORMATION: Chest pain, lower abdominal pain,   vaginal bleeding. History of sarcoma.    COMPARISON: 6/29/2022.    CONTRAST/COMPLICATIONS:  IV Contrast: NONE  Oral Contrast: NONE  Complications: None reported at time of study completion    PROCEDURE:  CT of the Chest, Abdomen and Pelvis was performed.  Sagittal and coronal reformats were performed.    FINDINGS:  CHEST:  LUNGS AND LARGE AIRWAYS: Patent central airways. Multiple bilateral   pulmonary nodules, a representative nodule in the left lower lobe   measures 2.5 x 1.9 cm. A few of the nodules also demonstrate cavitation.   Overall interval increase since recent CT of 6/29/2022.  PLEURA: No pleural effusion.  VESSELS: Within normal limits.  HEART: Heart size is normal. No pericardial effusion. ICD leads in the   right atrium and right medical.  MEDIASTINUM AND FLORENTIN: No lymphadenopathy.  CHEST WALL AND LOWER NECK: Within normal limits.    ABDOMEN AND PELVIS:  LIVER: A heterogeneous low-density mass of 2 cm in segment 7, previously   1.3 cm.  BILE DUCTS: Normal caliber.  GALLBLADDER: Within normal limits.  SPLEEN: Within normal limits.  PANCREAS: Within normal limits.  ADRENALS: Within normal limits.  KIDNEYS/URETERS: Right percutaneous nephrostomy catheter. 4 mm   nonobstructing stone in the lower pole. Left kidney is markedly enlarged   with hydronephrosis and cortical thinning, likely chronic. Left ureter is   dilated to the level of the urinary bladder.    BLADDER/REPRODUCTIVE ORGANS: Marked mural thickening of the urinary   bladder which is inseparable from a soft tissue density which may   represent combination of uterus and additional neoplasm.    BOWEL: No bowel obstruction. Appendix is normal. Moderate to large stool.  PERITONEUM: No ascites.  VESSELS: Within normal limits.  RETROPERITONEUM/LYMPH NODES: Retroperitoneal lymphadenopathy, a   representative node of 1.5 x 2.3 cm (2, 141).  ABDOMINAL WALL: Within normal limits.  BONES: Within normal limits.    IMPRESSION:  Interval progression of metastatic disease in the chest.  Interval progression of right hepatic lesion.  Appearance of the urinary bladder is without significant change and is   likely from neoplasm. Urinary bladder is also inseparable from another   soft tissue density in the pelvis which may represent combination of   uterus or additional disease.  Retroperitoneal lymphadenopathy is without significant change.    --- End of Report ---            ALLISON SOLORZANO MD; Attending Radiologist  This document has been electronically signed. Jul 31 2022  3:00PM    < end of copied text >    < from: CT Abdomen and Pelvis No Cont (07.31.22 @ 14:38) >  ACC: [id number] EXAM:  CT ABDOMEN AND PELVIS                        ACC: [id number] EXAM:  CT CHEST                          PROCEDURE DATE:  07/31/2022          INTERPRETATION:  CLINICAL INFORMATION: Chest pain, lower abdominal pain,   vaginal bleeding. History of sarcoma.    COMPARISON: 6/29/2022.    CONTRAST/COMPLICATIONS:  IV Contrast: NONE  Oral Contrast: NONE  Complications: None reported at time of study completion    PROCEDURE:  CT of the Chest, Abdomen and Pelvis was performed.  Sagittal and coronal reformats were performed.    FINDINGS:  CHEST:  LUNGS AND LARGE AIRWAYS: Patent central airways. Multiple bilateral   pulmonary nodules, a representative nodule in the left lower lobe   measures 2.5 x 1.9 cm. A few of the nodules also demonstrate cavitation.   Overall interval increase since recent CT of 6/29/2022.  PLEURA: No pleural effusion.  VESSELS: Within normal limits.  HEART: Heart size is normal. No pericardial effusion. ICD leads in the   right atrium and right medical.  MEDIASTINUM AND FLORENTIN: No lymphadenopathy.  CHEST WALL AND LOWER NECK: Within normal limits.    ABDOMEN AND PELVIS:  LIVER: A heterogeneous low-density mass of 2 cm in segment 7, previously   1.3 cm.  BILE DUCTS: Normal caliber.  GALLBLADDER: Within normal limits.  SPLEEN: Within normal limits.  PANCREAS: Within normal limits.  ADRENALS: Within normal limits.  KIDNEYS/URETERS: Right percutaneous nephrostomy catheter. 4 mm   nonobstructing stone in the lower pole. Left kidney is markedly enlarged   with hydronephrosis and cortical thinning, likely chronic. Left ureter is   dilated to the level of the urinary bladder.    BLADDER/REPRODUCTIVE ORGANS: Marked mural thickening of the urinary   bladder which is inseparable from a soft tissue density which may   represent combination of uterus and additional neoplasm.    BOWEL: No bowel obstruction. Appendix is normal. Moderate to large stool.  PERITONEUM: No ascites.  VESSELS: Within normal limits.  RETROPERITONEUM/LYMPH NODES: Retroperitoneal lymphadenopathy, a   representative node of 1.5 x 2.3 cm (2, 141).  ABDOMINAL WALL: Within normal limits.  BONES: Within normal limits.    IMPRESSION:  Interval progression of metastatic disease in the chest.  Interval progression of right hepatic lesion.  Appearance of the urinary bladder is without significant change and is   likely from neoplasm. Urinary bladder is also inseparable from another   soft tissue density in the pelvis which may represent combination of   uterus or additional disease.  Retroperitoneal lymphadenopathy is without significant change.    --- End of Report ---            ALLISON SOLORZANO MD; Attending Radiologist  This document has been electronically signed. Jul 31 2022  3:00PM    < end of copied text >      ORT Score -   Family Hx of substance abuse	Female	      Male  Alcohol 	                                           1                     3  Illegal drugs	                                   2                     3  Rx drugs                                           4 	                  4  Personal Hx of substance abuse		  Alcohol 	                                          3	                  3  Illegal drugs                                     4	                  4  Rx drugs                                            5 	                  5  Age between 16- 45 years	           1                     1  hx preadolescent sexual abuse	   3 	                  0  Psychological disease		  ADD, OCD, bipolar, schizophrenia   2	          2  Depression                                           1 	          1  Total: 0    a score of 3 or lower indicates low risk for opioid abuse		  a score of 4-7 indicates moderate risk for opioid abuse		  a score of 8 or higher indicates high risk for opioid abuse  	  REVIEW OF SYSTEMS:  CONSTITUTIONAL: No fever + fatigue  HEENT: + frontal headache, No difficulty hearing, no change in vision  NECK: No pain or stiffness  RESPIRATORY: No cough, wheezing, chills or hemoptysis; No shortness of breath  CARDIOVASCULAR: No chest pain, palpitations, dizziness, or leg swelling  GASTROINTESTINAL: + loss of appetite, decreased PO intake. + abdominal pain, occasional spasms. No nausea, vomiting; No diarrhea or constipation.   GENITOURINARY: + right nephrostomy tube. No dysuria, frequency, hematuria, retention or incontinence  MUSCULOSKELETAL: + b/l flank pain. No joint swelling; no upper or lower motor strength weakness, no saddle anesthesia, bowel/bladder incontinence, no falls   NEURO: No headaches, No numbness/tingling b/l LE, No weakness    PHYSICAL EXAM:  GENERAL:  Alert & Oriented X4, cooperative, NAD, Good concentration. Speech is clear. + anxious  RESPIRATORY: Respirations even and unlabored. Clear to auscultation bilaterally; No rales, rhonchi, wheezing, or rubs  CARDIOVASCULAR: Normal S1/S2, regular rate and rhythm; No murmurs, rubs, or gallops. No JVD.   GASTROINTESTINAL:  Soft, + generalized tenderness, Nondistended; Bowel sounds present  GENITOURINARY: right nephrostomy tube draining clear yellow urine to bag.   PERIPHERAL VASCULAR:  Extremities warm without edema. 2+ Peripheral Pulses, No cyanosis, No calf tenderness  MUSCULOSKELETAL: Motor Strength 5/5 B/L upper and lower extremities; moves all extremities equally against gravity; ROM intact;  + b/l flank tenderness on palpation.    SKIN: + right nephrostomy dressing c/d/i; Warm, dry, intact. No rashes, lesions, scars or wounds.     Risk factors associated with adverse outcomes related to opioid treatment  [ ]  Concurrent benzodiazepine use  [ ]  History/ Active substance use or alcohol use disorder  [ ] Psychiatric co-morbidity  [ ] Sleep apnea  [ ] COPD  [ ] BMI> 35  [ ] Liver dysfunction  [X ] Renal dysfunction  [ ] CHF  [ ] Smoker  [ ]  Age > 60 years    [X ]  [place]  Reviewed and Copied to Chart. See below.    Plan of care and goal oriented pain management treatment options were discussed with patient and /or primary care giver; all questions and concerns were addressed and care was aligned with patient's wishes.    Educated patient on goal oriented pain management treatment options     08-03-22 @ 11:47

## 2022-08-03 NOTE — PROGRESS NOTE ADULT - SUBJECTIVE AND OBJECTIVE BOX
Patient is a 56y old  Female who presents with a chief complaint of Abdominal pain (03 Aug 2022 16:12)      MEDICATIONS  (STANDING):  senna 2 Tablet(s) Oral at bedtime  sodium chloride 0.9%. 1000 milliLiter(s) (100 mL/Hr) IV Continuous <Continuous>    MEDICATIONS  (PRN):  acetaminophen     Tablet .. 325 milliGRAM(s) Oral every 4 hours PRN Temp greater or equal to 38C (100.4F), Mild Pain (1 - 3)  melatonin 3 milliGRAM(s) Oral at bedtime PRN Insomnia  morphine  IR 7.5 milliGRAM(s) Oral every 6 hours PRN Severe Pain (7 - 10)  polyethylene glycol 3350 17 Gram(s) Oral daily PRN Constipation      ROS  No fever, sweats, chills  No epistaxis, HA, sore throat  No CP, SOB, cough, sputum  No n/v/d, abd pain, melena, hematochezia  No edema  No rash  No anxiety  No back pain, joint pain  No bleeding, bruising  No dysuria, hematuria    Vital Signs Last 24 Hrs  T(C): 36.6 (03 Aug 2022 16:24), Max: 36.7 (03 Aug 2022 13:51)  T(F): 97.8 (03 Aug 2022 16:24), Max: 98 (03 Aug 2022 13:51)  HR: 65 (03 Aug 2022 16:24) (53 - 70)  BP: 100/65 (03 Aug 2022 16:24) (100/65 - 122/66)  BP(mean): 77 (03 Aug 2022 16:24) (76 - 84)  RR: 18 (03 Aug 2022 16:24) (17 - 18)  SpO2: 94% (03 Aug 2022 16:24) (94% - 100%)    Parameters below as of 03 Aug 2022 16:24  Patient On (Oxygen Delivery Method): room air        PE  NAD  Awake, alert  Anicteric, MMM  RRR  CTAB  Abd soft, NT, ND  No c/c/e  No rash grossly  FROM                          10.1   8.13  )-----------( 304      ( 02 Aug 2022 06:40 )             32.1       08-02    141  |  110<H>  |  25<H>  ----------------------------<  97  4.8   |  26  |  1.19    Ca    9.2      02 Aug 2022 15:22  Phos  3.3     08-02  Mg     2.5     08-02

## 2022-08-03 NOTE — PROGRESS NOTE ADULT - SUBJECTIVE AND OBJECTIVE BOX
Patient is seen and examined at the bed side, is afebrile. She is c/o headache, BP is running low normal.      REVIEW OF SYSTEMS: All other review systems are negative      ALLERGIES: No Known Allergies      Vital Signs Last 24 Hrs  T(C): 36.7 (03 Aug 2022 13:51), Max: 36.7 (03 Aug 2022 13:51)  T(F): 98 (03 Aug 2022 13:51), Max: 98 (03 Aug 2022 13:51)  HR: 70 (03 Aug 2022 13:51) (53 - 70)  BP: 104/64 (03 Aug 2022 13:51) (102/63 - 122/66)  BP(mean): 84 (03 Aug 2022 05:33) (76 - 84)  RR: 17 (03 Aug 2022 13:51) (17 - 18)  SpO2: 98% (03 Aug 2022 13:51) (98% - 100%)    Parameters below as of 03 Aug 2022 13:51  Patient On (Oxygen Delivery Method): room air      PHYSICAL EXAM:  GENERAL: Not in distress   CHEST/LUNG:  Not using accessory muscles   HEART: s1 and s2 present  ABDOMEN:  Nontender and  Nondistended  : Right nephrostomy tube in placed and draining clear urine  EXTREMITIES: No pedal  edema  CNS: Awake and Alert      LABS:  No new  Labs                           10.1   8.13  )-----------( 304      ( 02 Aug 2022 06:40 )             32.1         08-    141  |  110<H>  |  25<H>  ----------------------------<  97  4.8   |  26  |  1.19    Ca    9.2      02 Aug 2022 15:22  Phos  3.3     08-02  Mg     2.5     08-02    TPro  7.4  /  Alb  2.6<L>  /  TBili  0.2  /  DBili  x   /  AST  11  /  ALT  13  /  AlkPhos  95  08-    142  |  109<H>  |  16  ----------------------------<  79  4.3   |  26  |  1.09    Ca    10.4      01 Aug 2022 06:35  Phos  3.2     08-01  Mg     2.4     08-    TPro  7.4  /  Alb  2.6<L>  /  TBili  0.2  /  DBili  x   /  AST  11  /  ALT  13  /  AlkPhos  95  08-    PT/INR - ( 2022 10:35 )   PT: 13.6 sec;   INR: 1.14 ratio      PTT - ( 2022 10:35 )  PTT:32.5 sec        Urinalysis Basic - ( 01 Aug 2022 07:34 )  Color: Yellow / Appearance: Slightly Turbid / S.015 / pH: x  Gluc: x / Ketone: Negative  / Bili: Negative / Urobili: Negative   Blood: x / Protein: 500 mg/dL / Nitrite: Negative   Leuk Esterase: Small / RBC: 25-50 /HPF / WBC 3-5 /HPF   Sq Epi: x / Non Sq Epi: Few /HPF / Bacteria: Few /HPF        MEDICATIONS  (STANDING):    senna 2 Tablet(s) Oral at bedtime  sodium chloride 0.9%. 1000 milliLiter(s) (100 mL/Hr) IV Continuous <Continuous>      RADIOLOGY & ADDITIONAL TESTS:    22: CT Abdomen and Pelvis No Cont (22 @ 14:38) Interval progression of metastatic disease in the chest.  Interval progression of right hepatic lesion. Appearance of the urinary bladder is without significant change and is   likely from neoplasm. Urinary bladder is also inseparable from another  soft tissue density in the pelvis which may represent combination of   uterus or additional disease. Retroperitoneal lymphadenopathy is without significant change.      22: CT Chest No Cont (22 @ 14:37) LUNGS AND LARGE AIRWAYS: Patent central airways. Multiple bilateral   pulmonary nodules, a representative nodule in the left lower lobe  measures 2.5 x 1.9 cm. A few of the nodules also demonstrate cavitation.   Overall interval increase since recent CT of 2022.      MICROBIOLOGY DATA:    Culture - Urine (22 @ 12:16)   Specimen Source: Clean Catch Clean Catch (Midstream)   Culture Results: No growth     Respiratory Viral Panel with COVID-19 by PERLITA (22 @ 10:35)   Rapid RVP Result: NotDete   SARS-CoV-2: NotDetec:              Patient remains afebrile and blood pressure is normal today.       REVIEW OF SYSTEMS: All other review systems are negative      ALLERGIES: No Known Allergies      Vital Signs Last 24 Hrs  T(C): 36.7 (03 Aug 2022 13:51), Max: 36.7 (03 Aug 2022 13:51)  T(F): 98 (03 Aug 2022 13:51), Max: 98 (03 Aug 2022 13:51)  HR: 70 (03 Aug 2022 13:51) (53 - 70)  BP: 104/64 (03 Aug 2022 13:51) (102/63 - 122/66)  BP(mean): 84 (03 Aug 2022 05:33) (76 - 84)  RR: 17 (03 Aug 2022 13:51) (17 - 18)  SpO2: 98% (03 Aug 2022 13:51) (98% - 100%)    Parameters below as of 03 Aug 2022 13:51  Patient On (Oxygen Delivery Method): room air      PHYSICAL EXAM:  GENERAL: Not in distress   CHEST/LUNG:  Not using accessory muscles   HEART: s1 and s2 present  ABDOMEN:  Nontender and  Nondistended  : Right nephrostomy tube in placed and draining clear urine  EXTREMITIES: No pedal  edema  CNS: Awake and Alert      LABS:  No new  Labs                           10.1   8.13  )-----------( 304      ( 02 Aug 2022 06:40 )             32.1         08-    141  |  110<H>  |  25<H>  ----------------------------<  97  4.8   |  26  |  1.19    Ca    9.2      02 Aug 2022 15:22  Phos  3.3     08-02  Mg     2.5     08-02    TPro  7.4  /  Alb  2.6<L>  /  TBili  0.2  /  DBili  x   /  AST  11  /  ALT  13  /  AlkPhos  95  08-    08-    142  |  109<H>  |  16  ----------------------------<  79  4.3   |  26  |  1.09    Ca    10.4      01 Aug 2022 06:35  Phos  3.2     08-01  Mg     2.4     08-    TPro  7.4  /  Alb  2.6<L>  /  TBili  0.2  /  DBili  x   /  AST  11  /  ALT  13  /  AlkPhos  95  08-01    PT/INR - ( 2022 10:35 )   PT: 13.6 sec;   INR: 1.14 ratio      PTT - ( 2022 10:35 )  PTT:32.5 sec        Urinalysis Basic - ( 01 Aug 2022 07:34 )  Color: Yellow / Appearance: Slightly Turbid / S.015 / pH: x  Gluc: x / Ketone: Negative  / Bili: Negative / Urobili: Negative   Blood: x / Protein: 500 mg/dL / Nitrite: Negative   Leuk Esterase: Small / RBC: 25-50 /HPF / WBC 3-5 /HPF   Sq Epi: x / Non Sq Epi: Few /HPF / Bacteria: Few /HPF        MEDICATIONS  (STANDING):    senna 2 Tablet(s) Oral at bedtime  sodium chloride 0.9%. 1000 milliLiter(s) (100 mL/Hr) IV Continuous <Continuous>      RADIOLOGY & ADDITIONAL TESTS:    22: CT Abdomen and Pelvis No Cont (22 @ 14:38) Interval progression of metastatic disease in the chest.  Interval progression of right hepatic lesion. Appearance of the urinary bladder is without significant change and is   likely from neoplasm. Urinary bladder is also inseparable from another  soft tissue density in the pelvis which may represent combination of   uterus or additional disease. Retroperitoneal lymphadenopathy is without significant change.      22: CT Chest No Cont (22 @ 14:37) LUNGS AND LARGE AIRWAYS: Patent central airways. Multiple bilateral   pulmonary nodules, a representative nodule in the left lower lobe  measures 2.5 x 1.9 cm. A few of the nodules also demonstrate cavitation.   Overall interval increase since recent CT of 2022.      MICROBIOLOGY DATA:    Culture - Urine (22 @ 12:16)   Specimen Source: Clean Catch Clean Catch (Midstream)   Culture Results: No growth     Respiratory Viral Panel with COVID-19 by PERLITA (22 @ 10:35)   Rapid RVP Result: NotDete   SARS-CoV-2: NotDetec:

## 2022-08-03 NOTE — DISCHARGE NOTE PROVIDER - HOSPITAL COURSE
56 Y o post menopausal female, with PMH of Anemia, Asthma, Bladder cancer (previously on chemo until 5/2022) with  Dr. Renay Trinidad, R nephrostomy tube, hyperparathyroidism, bradycardia with PPM, recent UTI admission 7/18, p/w three days gradual onset, worsening, constant 8/10 intermittent cramping B/l flank pain alleviated by tylenol worse with urination surrounding a clogged- non flushing nephrostomy tube, and large clots from the vagina, Hgb 10.3 from 9.7, UA+Ve for Ketones and blood no white blood cells, CT abdomen and pelvis and CT chest show progression of metastatic disease and soft tissue density in the pelvis. Heme/onc Dr Recio following. was recommended to follow up with her heme/onc at [place], however pt is insisting to have bx done here inpatient. IR consulted for bx,  recommended ct [email or web address] with IV con. Pt refused IV contrast and spoke with primary doctor and decided do PEt scan OP and follow up with her oncologist. Pt will also follow up with IR in week to likely change  neph tube. pt is medically optimized for dicharge.    Please note that this a brief summary of hospital course please refer to daily progress notes and consult notes for full course and events 56 Y o post menopausal female, with PMH of Anemia, Asthma, Bladder cancer (previously on chemo until 5/2022) with  Dr. Renay Trinidad, R nephrostomy tube, hyperparathyroidism, bradycardia with PPM, recent UTI admission 7/18, p/w three days gradual onset, worsening, constant 8/10 intermittent cramping B/l flank pain alleviated by tylenol worse with urination surrounding a clogged- non flushing nephrostomy tube, and large clots from the vagina, Hgb 10.3 from 9.7, UA+Ve for Ketones and blood no white blood cells, CT abdomen and pelvis and CT chest show progression of metastatic disease and soft tissue density in the pelvis. Heme/onc Dr Recio following. was recommended to follow up with her heme/onc at [place], however pt is insisting to have bx done here inpatient. IR consulted for bx,  recommended ct [email or web address] with IV con. Pt refused IV contrast and spoke with primary doctor and decided do PEt scan OP and follow up with her oncologist. Pt will also follow up with IR in week to likely change  neph tube. pt is medically optimized for dicharge.    Please note that this a brief summary of hospital course please refer to daily progress notes and consult notes for full course and events

## 2022-08-03 NOTE — DISCHARGE NOTE PROVIDER - CARE PROVIDER_API CALL
Priscila Rosario  CARDIOVASCULAR DISEASE  [address], [address]  [place] [address]  Phone: [phone]  Fax: [phone]  Follow Up Time: 1 week    IR [facility],   Pt yaritza rolle for IR  Phone: (   )    -  Fax: (   )    -  Follow Up Time: 1 week

## 2022-08-03 NOTE — DISCHARGE NOTE PROVIDER - PROVIDER TOKENS
PROVIDER:[TOKEN:[[id number]],FOLLOWUP:[1 week]],FREE:[LAST:[IR [facility]],PHONE:[(   )    -],FAX:[(   )    -],ADDRESS:[Pt knows numer for IR],FOLLOWUP:[1 week]]

## 2022-08-03 NOTE — DISCHARGE NOTE NURSING/CASE MANAGEMENT/SOCIAL WORK - PATIENT PORTAL LINK FT
You can access the FollowMyHealth Patient Portal offered by [facility] by registering at the following website: [email or web address]. By joining [facility]’s FollowMyHealth portal, you will also be able to view your health information using other applications (apps) compatible with our system.

## 2022-08-03 NOTE — PROGRESS NOTE ADULT - ASSESSMENT
This is a 56 year old woman with bladder cancer. She appears to have developed hematuria in 2019 with a negative subsequent cystoscopy and PET/CT per patient previously. Then in 2020, she was found to have a bladder mass which was biopsied and initially treated with chemotherapy at [facility] then discontinued due to pyelonephritis. She switched oncologist and currently follows with Dr Hoyt at [facility]. She was most recently on carboplatin/gemcitabine, with last dose given in May 2022 (unable to get treatment due to recurrent hospitalizations since).     # Bladder cancer  - Unclear initial stage, appears to be metastatic  - Based on most recent CT scan from 7/31, consistent with progression of disease  - She continues to follow with Dr Hoyt at [facility]  - Records/additional information from her primary oncologist will be helpful to complete assessment  - No plan to treat with chemotherapy during this admission  - Discussed potential next step and treatment options, which include further systemic therapy and immunotherapy  - Pt does not want inpatient biopsy; would like to have pet ct outpatient followed by outpt bx  - f/u IR: pct intermittent clogging.    f/u with oncologist at [facility] when discharged

## 2022-08-12 PROBLEM — N20.0 RIGHT KIDNEY STONE: Status: ACTIVE | Noted: 2022-01-01

## 2022-08-12 PROBLEM — N13.30 HYDRONEPHROSIS, BILATERAL: Status: ACTIVE | Noted: 2021-05-05

## 2022-08-12 PROBLEM — T83.098A MALFUNCTION OF NEPHROSTOMY TUBE: Status: ACTIVE | Noted: 2021-07-08

## 2022-08-12 NOTE — HISTORY OF PRESENT ILLNESS
[FreeTextEntry1] : 56  y.o woman with hx of TCC, recent hospitalization in July 2022, recent CT AP 7/31/22 -\par  CT ABDOMEN AND PELVIS       -              \par  CT CHEST  PROCEDURE DATE:  07/31/2022  \par INTERPRETATION:  CLINICAL INFORMATION: Chest pain, lower abdominal pain, \par vaginal bleeding. History of sarcoma.\par \par COMPARISON: 6/29/2022.\par \par CONTRAST/COMPLICATIONS:\par IV Contrast: NONE\par Oral Contrast: NONE\par Complications: None reported at time of study completion\par \par PROCEDURE:\par CT of the Chest, Abdomen and Pelvis was performed.\par Sagittal and coronal reformats were performed.\par \par FINDINGS:\par CHEST:\par LUNGS AND LARGE AIRWAYS: Patent central airways. Multiple bilateral \par pulmonary nodules, a representative nodule in the left lower lobe \par measures 2.5 x 1.9 cm. A few of the nodules also demonstrate cavitation. \par Overall interval increase since recent CT of 6/29/2022.\par PLEURA: No pleural effusion.\par VESSELS: Within normal limits.\par HEART: Heart size is normal. No pericardial effusion. ICD leads in the \par right atrium and right medical.\par MEDIASTINUM AND FLORENTIN: No lymphadenopathy.\par CHEST WALL AND LOWER NECK: Within normal limits.\par \par ABDOMEN AND PELVIS:\par LIVER: A heterogeneous low-density mass of 2 cm in segment 7, previously \par 1.3 cm.\par BILE DUCTS: Normal caliber.\par GALLBLADDER: Within normal limits.\par SPLEEN: Within normal limits.\par PANCREAS: Within normal limits.\par ADRENALS: Within normal limits.\par KIDNEYS/URETERS: Right percutaneous nephrostomy catheter. 4 mm \par nonobstructing stone in the lower pole. Left kidney is markedly enlarged \par with hydronephrosis and cortical thinning, likely chronic. Left ureter is \par dilated to the level of the urinary bladder.\par \par BLADDER/REPRODUCTIVE ORGANS: Marked mural thickening of the urinary \par bladder which is inseparable from a soft tissue density which may \par represent combination of uterus and additional neoplasm.\par \par BOWEL: No bowel obstruction. Appendix is normal. Moderate to large stool.\par PERITONEUM: No ascites.\par VESSELS: Within normal limits.\par RETROPERITONEUM/LYMPH NODES: Retroperitoneal lymphadenopathy, a \par representative node of 1.5 x 2.3 cm (2, 141).\par ABDOMINAL WALL: Within normal limits.\par BONES: Within normal limits.\par \par IMPRESSION:\par Interval progression of metastatic disease in the chest.\par Interval progression of right hepatic lesion.\par Appearance of the urinary bladder is without significant change and is \par likely from neoplasm. Urinary bladder is also inseparable from another \par soft tissue density in the pelvis which may represent combination of \par uterus or additional disease.\par Retroperitoneal lymphadenopathy is without significant change.\par \par \par She has been flushing her Right NT daily and is reporting pain at site and waking up at night with burning sensation and drains 900 ml per day form her NT drainage.Was having diarrhea and now constipated,recommend fibers and stool softeners. She is due for her PET scan 8/22/22 and and they are planning to do NT change in September or Aug end.Last chemo was in May 2022 and pending rx after PET scan findings. \par PVR 2 ml\par She has a non obstructing 4  mm stone on right kidney .NT flushed with 10  cc sterile water and no resistance noted, Dressing CDI.\par \par

## 2022-08-12 NOTE — ASSESSMENT
[FreeTextEntry1] : 56  y.o woman with hx of TCC , complex pt with multiple comorbidities, last chemo was in May 2022. \par \par Continue NT flushing daily\par Fluids and fiber reviewed.\par \par Try to get PET scan 8/22\par Try to get NT replaced if NT problems persist\par UA and cx send - call for results\par Follow up with Dr Trinidad in September after her PET scan and NT change. \par

## 2022-08-12 NOTE — REVIEW OF SYSTEMS
[Recent Weight Loss (___ Lbs)] : recent [unfilled] ~Ulb weight loss [Constipation] : constipation [see HPI] : see HPI [Sleep Disturbances] : sleep disturbances [Depression] : depression [Negative] : Heme/Lymph [Dysuria] : no dysuria

## 2022-08-15 NOTE — ED PROVIDER NOTE - PROGRESS NOTE DETAILS
pt with UTI, CRYSTAL, will admit pt.  case d/w Dr. Ochoa.  pt's is not compliance with her treatment during her stay in ED.  Pt refuses CXR, refuses emds, also Abx in the beginning. pt with UTI, CRYSTAL, will admit pt.  case d/w Dr. Ochoa.  pt's is not compliance with her treatment during her stay in ED.  Pt refuses CXR, refuses analgesics, also Abx in the beginning.

## 2022-08-15 NOTE — ED PROVIDER NOTE - MUSCULOSKELETAL, MLM
Spine appears normal, range of motion is not limited, Rt CVAT, nephrostomy tube noted draining urine

## 2022-08-15 NOTE — ED PROVIDER NOTE - NS ED ATTENDING STATEMENT MOD
"Subjective:       Patient ID: Najma Kelly is a 36 y.o. female.    Vitals:  height is 5' 3" (1.6 m) and weight is 60.8 kg (134 lb). Her oral temperature is 98.4 °F (36.9 °C). Her blood pressure is 142/91 (abnormal) and her pulse is 101. Her respiration is 20 and oxygen saturation is 99%.     Chief Complaint: Sinus Problem    Patient complains of a RN, congestion, PND, sore throat, ear pain, cough, headache, and sinus pressure/pain for the past 2 days.  Patient reports fever, body aches, and chills as well.  Patient denies improvement with OTC meds.  Patient denies any other complaints at this time.       Sinus Problem   This is a new problem. The current episode started in the past 7 days (x2days). The problem has been gradually worsening since onset. The maximum temperature recorded prior to her arrival was 101 - 101.9 F. The fever has been present for less than 1 day. Associated symptoms include chills, congestion, coughing, ear pain, headaches, sinus pressure and a sore throat. Pertinent negatives include no diaphoresis, hoarse voice, neck pain, shortness of breath, sneezing or swollen glands. Past treatments include nothing.       Constitution: Positive for chills, fatigue and fever. Negative for sweating.   HENT: Positive for ear pain, congestion, postnasal drip, sinus pressure and sore throat. Negative for sinus pain and voice change.    Neck: Negative for neck pain and painful lymph nodes.   Cardiovascular: Negative for chest pain.   Eyes: Negative for eye redness.   Respiratory: Positive for cough. Negative for chest tightness, sputum production, bloody sputum, COPD, shortness of breath, stridor, wheezing and asthma.    Gastrointestinal: Negative for abdominal pain, nausea, vomiting and diarrhea.   Musculoskeletal: Positive for muscle ache.   Skin: Negative for rash.   Allergic/Immunologic: Negative for seasonal allergies, asthma and sneezing.   Neurological: Positive for headaches. "   Hematologic/Lymphatic: Negative for swollen lymph nodes.       Objective:      Physical Exam   Constitutional: She is oriented to person, place, and time. She appears well-developed and well-nourished. She is cooperative.  Non-toxic appearance. She does not have a sickly appearance. She does not appear ill. No distress.   HENT:   Head: Normocephalic and atraumatic.   Right Ear: Hearing, external ear and ear canal normal. A middle ear effusion (serous) is present.   Left Ear: Hearing, external ear and ear canal normal. A middle ear effusion (serous) is present.   Nose: Mucosal edema (and nasal congestion) and rhinorrhea (scant, clear) present. No nasal deformity. No epistaxis. Right sinus exhibits no maxillary sinus tenderness and no frontal sinus tenderness. Left sinus exhibits no maxillary sinus tenderness and no frontal sinus tenderness.   Mouth/Throat: Uvula is midline and mucous membranes are normal. No trismus in the jaw. Normal dentition. No uvula swelling. Posterior oropharyngeal erythema and cobblestoning present. No oropharyngeal exudate, posterior oropharyngeal edema or tonsillar abscesses. No tonsillar exudate.   Eyes: Conjunctivae and lids are normal. No scleral icterus.   Neck: Trachea normal, full passive range of motion without pain and phonation normal. Neck supple. No neck rigidity. No edema and no erythema present.   Cardiovascular: Normal rate, regular rhythm, normal heart sounds, intact distal pulses and normal pulses.   Pulmonary/Chest: Effort normal and breath sounds normal. No respiratory distress. She has no decreased breath sounds. She has no rhonchi.   Abdominal: Normal appearance.   Musculoskeletal: Normal range of motion. She exhibits no edema or deformity.   Neurological: She is alert and oriented to person, place, and time. She exhibits normal muscle tone. Coordination normal.   Skin: Skin is warm, dry, intact, not diaphoretic and not pale.   Psychiatric: She has a normal mood and  affect. Her speech is normal and behavior is normal. Judgment and thought content normal. Cognition and memory are normal.   Nursing note and vitals reviewed.        Results for orders placed or performed in visit on 02/16/20   POCT Influenza A/B   Result Value Ref Range    Rapid Influenza A Ag Negative Negative    Rapid Influenza B Ag Negative Negative     Acceptable Yes        Assessment:       1. Flu-like symptoms    2. Viral syndrome        Plan:         Flu-like symptoms  -     POCT Influenza A/B    Viral syndrome  -     predniSONE (DELTASONE) 20 MG tablet; Take 1 tablet (20 mg total) by mouth once daily. for 4 days  Dispense: 4 tablet; Refill: 0  -     brompheniramine-pseudoeph-DM (BROMFED DM) 2-30-10 mg/5 mL Syrp; Take 10 mLs by mouth every 6 (six) hours as needed.  Dispense: 120 mL; Refill: 0          Patient Instructions   1.  Take all medications as directed. If you have been prescribed antibiotics, make sure to complete them.   2.  Rest and keep yourself/patient well hydrated. For adults, it is recommended to drink at least 8-10 glasses of water daily.   3.  For patients above 6 months of age who are not allergic to and are not on anticoagulants, you can alternate Tylenol and Motrin every 4-6 hours for fever above 100.4F and/or pain.  For patients less than 6 months of age, allergic to or intolerant to NSAIDS, have gastritis, gastric ulcers, or history of GI bleeds, are pregnant, or are on anticoagulant therapy, you can take Tylenol every 4 hours as needed for fever above 100.4F and/or pain.   4. You should schedule a follow-up appointment with your Primary Care Provider/Pediatrician for recheck in 2-3 days or as directed at this visit.   5.  If your condition fails to improve in a timely manner, you should receive another evaluation by your Primary Care Provider/Pediatrician to discuss your concerns or return to urgent care for a recheck.  If your condition worsens at any time, you should  "report immediately to your nearest Emergency Department for further evaluation. **You must understand that you have received Urgent Care treatment only and that you may be released before all of your medical problems are known or treated. You, the patient, are responsible to arrange for follow-up care as instructed.         Viral Syndrome (Adult)  A viral illness may cause a number of symptoms. The symptoms depend on the part of the body that the virus affects. If it settles in your nose, throat, and lungs, it may cause cough, sore throat, congestion, and sometimes headache. If it settles in your stomach and intestinal tract, it may cause vomiting and diarrhea. Sometimes it causes vague symptoms like "aching all over," feeling tired, loss of appetite, or fever.  A viral illness usually lasts 1 to 2 weeks, but sometimes it lasts longer. In some cases, a more serious infection can look like a viral syndrome in the first few days of the illness. You may need another exam and additional tests to know the difference. Watch for the warning signs listed below.  Home care  Follow these guidelines for taking care of yourself at home:  · If symptoms are severe, rest at home for the first 2 to 3 days.  · Stay away from cigarette smoke - both your smoke and the smoke from others.  · You may use over-the-counter acetaminophen or ibuprofen for fever, muscle aching, and headache, unless another medicine was prescribed for this. If you have chronic liver or kidney disease or ever had a stomach ulcer or GI bleeding, talk with your doctor before using these medicines. No one who is younger than 18 and ill with a fever should take aspirin. It may cause severe disease or death.  · Your appetite may be poor, so a light diet is fine. Avoid dehydration by drinking 8 to 12 8-ounce glasses of fluids each day. This may include water; orange juice; lemonade; apple, grape, and cranberry juice; clear fruit drinks; electrolyte replacement and " sports drinks; and decaffeinated teas and coffee. If you have been diagnosed with a kidney disease, ask your doctor how much and what types of fluids you should drink to prevent dehydration. If you have kidney disease, drinking too much fluid can cause it build up in the your body and be dangerous to your health.  · Over-the-counter remedies won't shorten the length of the illness but may be helpful for cough, sore throat; and nasal and sinus congestion. Don't use decongestants if you have high blood pressure.  Follow-up care  Follow up with your healthcare provider if you do not improve over the next week.  Call 911  Get emergency medical care if any of the following occur:  · Convulsion  · Feeling weak, dizzy, or like you are going to faint  · Chest pain, shortness of breath, wheezing, or difficulty breathing  When to seek medical advice  Call your healthcare provider right away if any of these occur:  · Cough with lots of colored sputum (mucus) or blood in your sputum  · Chest pain, shortness of breath, wheezing, or difficulty breathing  · Severe headache; face, neck, or ear pain  · Severe, constant pain in the lower right side of your belly (abdominal)  · Continued vomiting (cant keep liquids down)  · Frequent diarrhea (more than 5 times a day); blood (red or black color) or mucus in diarrhea  · Feeling weak, dizzy, or like you are going to faint  · Extreme thirst  · Fever of 100.4°F (38°C) or higher, or as directed by your healthcare provider  Date Last Reviewed: 9/25/2015  © 8954-0466 [employer]. [address] [place] [address], [place] [address]. All rights reserved. This information is not intended as a substitute for professional medical care. Always follow your healthcare professional's instructions.               Attending Only

## 2022-08-15 NOTE — ED PROVIDER NOTE - OBJECTIVE STATEMENT
56 y.o female with a history of anemia, asthma, hypertension, hyperlipidemia, and bladder cancer (diagnosed 2020 and received chemotherapy and radiation) is complaining of pain because of her nephrostomy tube. Patient state she got a nephrostomy tube placed x1 year ago and has it changed every x3 months, and states that the tube is clogged, giving her severe pain for x4 days. Patient states she can urinate, and urine comes outer when she flushes the tube. Patient endorses chills, but denies fever. Patient states she has been taking Tylenol every x4 hours, and her last dose was at 6PM today by the nurse. Patient also endorses feeling nauseous and weak, and that the Tylenol helps her symptoms. Patient states that she has had kidney infections before, which is why she has te nephrostomy tube in place.

## 2022-08-15 NOTE — ED PROVIDER NOTE - CARE PLAN
1 Principal Discharge DX:	CRYSTAL (acute kidney injury)  Secondary Diagnosis:	Acute UTI  Secondary Diagnosis:	Anemia

## 2022-08-16 NOTE — H&P ADULT - ASSESSMENT
This is a 56 year old female from home, post menopausal female, with PMH of Anemia, Asthma, Bladder cancer (previously on chemo until 5/2022), R nephrostomy tube, hyperparathyroidism, bradycardia with PPM coming in UTI.

## 2022-08-16 NOTE — H&P ADULT - NSHPADDITIONALINFOADULT_GEN_ALL_CORE
Patient seen, examined, and interviewed by me, case discussed with me, chart reviewed, agree with above H/P as reviewed.  See  full note above.  Dr. LUIS ANGEL Ochoa ([phone])

## 2022-08-16 NOTE — H&P ADULT - HISTORY OF PRESENT ILLNESS
This is a 56 year old female from home, post menopausal female, with PMH of Anemia, Asthma, Bladder cancer (previously on chemo until 5/2022) with  Dr. Renay Trinidad, R nephrostomy tube, hyperparathyroidism, bradycardia with PPM coming in for fevers and chills at home. She states that her nephrostomy tubes tends to get clogged often and she has been flushing it constantly. Since yesterday she has been feeling lethargic, with chills and abdominal pain. She states in the past similar symptoms occured when she was found to have UTI. She denies any headaches, visual disturbances, dizziness, falls, chest pain, palpitations, lower extremity swelling, fevers, skin rash, recent travel, or sick contacts   This is a 56 year old female from home, post menopausal female, with PMH of Anemia, Asthma, Bladder cancer (previously on chemo until 5/2022) with  Dr. Renay Trinidad, R nephrostomy tube, hyperparathyroidism, bradycardia with PPM coming in for fevers and chills at home.   She states that her nephrostomy tubes tends to get clogged often and she has been flushing it constantly. Since yesterday she has been feeling lethargic, with chills and abdominal pain. She states in the past similar symptoms occurred when she was found to have UTI.   She denies any headaches, visual disturbances, dizziness, falls, chest pain, palpitations, lower extremity swelling, fevers, skin rash, recent travel, or sick contacts

## 2022-08-16 NOTE — H&P ADULT - NSHPREVIEWOFSYSTEMS_GEN_ALL_CORE
CONSTITUTIONAL: No fever, weight loss, or fatigue  RESPIRATORY: No cough, wheezing, chills or hemoptysis; No shortness of breath  CARDIOVASCULAR: No chest pain, palpitations, dizziness, or leg swelling  GASTROINTESTINAL: No abdominal pain. No nausea, vomiting, or hematemesis; No diarrhea or constipation. No melena or hematochezia.  GENITOURINARY: No dysuria or hematuria, urinary frequency  NEUROLOGICAL: No headaches, memory loss, loss of strength, numbness, or tremors  ENDOCRINE: No polyuria, polydipsia, or heat/cold intolerance  MUSKULOSKELETAL: No muscle aches, joint pains  HEME: no easy bruisability, no tender or enlarged lymph nodes  SKIN: No itching, burning, rashes, or lesions . CONSTITUTIONAL: No fever, weight loss, or fatigue  RESPIRATORY: No cough, wheezing, chills or hemoptysis; No shortness of breath  CARDIOVASCULAR: No chest pain, palpitations, dizziness, or leg swelling  GASTROINTESTINAL: Abdominal pain.  GENITOURINARY: No dysuria or hematuria, urinary frequency  NEUROLOGICAL: No headaches, memory loss, loss of strength, numbness, or tremors  ENDOCRINE: No polyuria, polydipsia, or heat/cold intolerance  MUSKULOSKELETAL: No muscle aches, joint pains  HEME: no easy bruisability, no tender or enlarged lymph nodes  SKIN: No itching, burning, rashes, or lesions .

## 2022-08-16 NOTE — H&P ADULT - NSHPPHYSICALEXAM_GEN_ALL_CORE
Vital Signs Last 24 Hrs  T(C): 36.6 (16 Aug 2022 01:06), Max: 36.9 (15 Aug 2022 15:45)  T(F): 97.9 (16 Aug 2022 01:06), Max: 98.5 (15 Aug 2022 15:45)  HR: 70 (16 Aug 2022 01:06) (70 - 85)  BP: 114/66 (16 Aug 2022 01:06) (114/66 - 122/75)  BP(mean): --  RR: 18 (16 Aug 2022 01:06) (17 - 18)  SpO2: 100% (16 Aug 2022 01:06) (98% - 100%)    Parameters below as of 16 Aug 2022 01:06  Patient On (Oxygen Delivery Method): room air    PHYSICAL EXAM:  GENERAL: NAD, speaks in full sentences, no signs of respiratory distress  HEAD:  Atraumatic, Normocephalic  EYES: EOMI, PERRLA, conjunctiva and sclera clear  NECK: Supple, No JVD  CHEST/LUNG: Clear to auscultation bilaterally; No wheeze; No crackles; No accessory muscles used  HEART: Regular rate and rhythm; No murmurs;   ABDOMEN: Right nephrostomy tube.   EXTREMITIES:  2+ Peripheral Pulses, No cyanosis or edema  PSYCH: AAOx3  NEUROLOGY: non-focal  SKIN: No rashes or lesions

## 2022-08-16 NOTE — PATIENT PROFILE ADULT - FALL HARM RISK - RISK INTERVENTIONS

## 2022-08-17 NOTE — PROGRESS NOTE ADULT - SUBJECTIVE AND OBJECTIVE BOX
Patient is a 56y old  Female who presents with a chief complaint of UTI (16 Aug 2022 02:35)      INTERVAL HPI/OVERNIGHT EVENTS: no overnight events    I&O's Summary    17 Aug 2022 07:01  -  17 Aug 2022 13:37  --------------------------------------------------------  IN: 0 mL / OUT: 400 mL / NET: -400 mL      Vital Signs Last 24 Hrs  T(C): 36.4 (17 Aug 2022 12:06), Max: 36.9 (16 Aug 2022 16:03)  T(F): 97.6 (17 Aug 2022 12:06), Max: 98.4 (16 Aug 2022 16:03)  HR: 67 (17 Aug 2022 12:06) (62 - 67)  BP: 101/54 (17 Aug 2022 12:06) (93/59 - 109/62)  BP(mean): --  RR: 18 (17 Aug 2022 12:06) (18 - 18)  SpO2: 100% (17 Aug 2022 12:06) (97% - 100%)    Parameters below as of 17 Aug 2022 12:06  Patient On (Oxygen Delivery Method): room air      PAST MEDICAL & SURGICAL HISTORY:  Anemia      Asthma      HLD (hyperlipidemia)      Pacemaker  [employer]      Bladder cancer      HTN (hypertension)      Parathyroid tumor      Liver cyst      Hydronephrosis  has right Nephrostomy tube - dressing intact      Bradycardia      History of renal calculi      H/O myomectomy      Presence of cardiac pacemaker      H/O hydronephrosis  s/p Right Perc nephrostomy tube placement 2021, exchange 2021          SOCIAL HISTORY  Alcohol:  Tobacco:  Illicit substance use:      FAMILY HISTORY:      LABS:                        9.0    7.21  )-----------( 328      ( 17 Aug 2022 06:56 )             30.1     08-17    141  |  109<H>  |  30<H>  ----------------------------<  106<H>  3.8   |  21<L>  |  1.36<H>    Ca    10.1      17 Aug 2022 06:56  Phos  3.6     08-16  Mg     2.5     08-16    TPro  8.1  /  Alb  2.6<L>  /  TBili  0.2  /  DBili  x   /  AST  14  /  ALT  14  /  AlkPhos  93  08-15      Urinalysis Basic - ( 16 Aug 2022 21:40 )    Color: Yellow / Appearance: Slightly Turbid / S.015 / pH: x  Gluc: x / Ketone: Negative  / Bili: Negative / Urobili: Negative   Blood: x / Protein: 500 mg/dL / Nitrite: Negative   Leuk Esterase: Moderate / RBC: 10-25 /HPF / WBC 26-50 /HPF   Sq Epi: x / Non Sq Epi: Few /HPF / Bacteria: Moderate /HPF      CAPILLARY BLOOD GLUCOSE      Urinalysis Basic - ( 16 Aug 2022 21:40 )    Color: Yellow / Appearance: Slightly Turbid / S.015 / pH: x  Gluc: x / Ketone: Negative  / Bili: Negative / Urobili: Negative   Blood: x / Protein: 500 mg/dL / Nitrite: Negative   Leuk Esterase: Moderate / RBC: 10-25 /HPF / WBC 26-50 /HPF   Sq Epi: x / Non Sq Epi: Few /HPF / Bacteria: Moderate /HPF        MEDICATIONS  (STANDING):  atorvastatin 40 milliGRAM(s) Oral at bedtime  budesonide  80 MICROgram(s)/formoterol 4.5 MICROgram(s) Inhaler 2 Puff(s) Inhalation two times a day  cefepime   IVPB 1000 milliGRAM(s) IV Intermittent every 12 hours  heparin   Injectable 5000 Unit(s) SubCutaneous every 8 hours  OLANZapine 5 milliGRAM(s) Oral at bedtime  ondansetron Injectable 4 milliGRAM(s) IV Push once    MEDICATIONS  (PRN):  acetaminophen     Tablet .. 650 milliGRAM(s) Oral every 6 hours PRN Temp greater or equal to 38C (100.4F), Mild Pain (1 - 3)  aluminum hydroxide/magnesium hydroxide/simethicone Suspension 30 milliLiter(s) Oral every 4 hours PRN Dyspepsia  melatonin 3 milliGRAM(s) Oral at bedtime PRN Insomnia  polyethylene glycol 3350 17 Gram(s) Oral daily PRN Constipation      REVIEW OF SYSTEMS:  CONSTITUTIONAL: No fever  EYES: No eye pain  ENMT:  No difficulty hearing, No sinus or throat pain  NECK: No pain or stiffness  RESPIRATORY: No cough; No shortness of breath  CARDIOVASCULAR: No chest pain, palpitations, dizziness, or leg swelling  GASTROINTESTINAL: No abdominal pain. No nausea, vomiting; No diarrhea or constipation.   GENITOURINARY: No dysuria, hematuria, or incontinence  NEUROLOGICAL: No headaches  SKIN: No rashes, or lesions   MUSCULOSKELETAL: No joint pain or swelling    RADIOLOGY & ADDITIONAL TESTS:    Imaging Personally Reviewed:  [ ] YES  [ ] NO    Consultant(s) Notes Reviewed:  [x ] YES  [ ] NO    PHYSICAL EXAM:  GENERAL: NAD  HEAD:  Atraumatic, Normocephalic  EYES: conjunctiva and sclera clear  ENMT: Moist mucous membranes  NECK: Supple, No JVD  NERVOUS SYSTEM:  Alert & Oriented X3, Good concentration  CHEST/LUNG: CTA bilaterally; No rales, rhonchi, wheezing  HEART: Regular rate and rhythm  ABDOMEN: Soft, Nontender, Nondistended; Bowel sounds present  EXTREMITIES:  2+ Peripheral Pulses, No clubbing, cyanosis, or edema  SKIN: No rashes or lesions    Care Collaborated Discussed with Consultants/Other Providers [ x] YES  [ ] NO

## 2022-08-17 NOTE — PROGRESS NOTE ADULT - ASSESSMENT
56 year old female from home, post menopausal female, with PMH of Anemia, Asthma, Bladder cancer (previously on chemo until 5/2022) with  Dr. Renay Trinidad, R nephrostomy tube, hyperparathyroidism, bradycardia with PPM coming in for fevers and chills at home. Admitted for UTI. UA positive started on Abx, cefepime. Dr Rodriguez ID consulted. Ir consulted for nephrostomy check. Pt. also noted to have CRYSTAL, s/p IVF bolus Scr downtrending. pending improved clinical course

## 2022-08-17 NOTE — PROGRESS NOTE ADULT - PROBLEM SELECTOR PLAN 2
on home med symbicort  c/w home med p/w Scr 1.14  s/p IVF blous  Scr 1.36 downtrending  avoid nephrotoxins  f/u BMP in AM

## 2022-08-17 NOTE — PROGRESS NOTE ADULT - PROBLEM SELECTOR PLAN 1
p/w dysuria, frequency  UA positive  Urine culture negative 8/15  Start ABx - Cefepime   ID: Dr. Rodriguez.

## 2022-08-17 NOTE — PROGRESS NOTE ADULT - ASSESSMENT
_________________________________________________________________________________________  ========>>  M E D I C A L   A T T E N D I N G    F O L L O W  U P  N O T E  <<=========  -----------------------------------------------------------------------------------------------------    - Patient seen and examined by me approximately sixty minutes ago.  - In summary,  JEF HOUSE is a 56y year old woman admitted with pain in back., possible UTI   - Patient today overall doing fairly somewhat uncomfortable, eating fairly..       pt generally uncomfortable, not sleeping much because of pain related to the nephrostomy, feels weak..     ==================>> REVIEW OF SYSTEM <<=================    GEN: no fever, no chills, as above   RESP: no SOB, no cough, no sputum  CVS: no chest pain, no palpitations, no edema  GI: no abdominal pain, no nausea, no constipation, no diarrhea  : no dysuria, no frequency, no hematuria, nephrostomy draining , pt flushes at times   Neuro: no headache, no dizziness  Derm : no itching, no rash    ==================>> PHYSICAL EXAM <<=================    GEN: A&O X 3 , NAD , uncomfortable, calm but tearful at times cachectic   HEENT: NCAT, PERRL, MMM, hearing intact, temporal wasting   Neck: supple , no JVD appreciated  CVS: S1S2 , regular , No M/R/G appreciated  PULM: CTA B/L,  no W/R/R appreciated  ABD.: soft. non tender, non distended,  bowel sounds present  Extrem: intact pulses , no edema   PSYCH : normal mood,  not anxious, as above                             ( Note Written / Date of service :  22 )    ==================>> MEDICATIONS <<====================    MEDICATIONS  (STANDING):  atorvastatin 40 milliGRAM(s) Oral at bedtime  budesonide  80 MICROgram(s)/formoterol 4.5 MICROgram(s) Inhaler 2 Puff(s) Inhalation two times a day  cefepime   IVPB 1000 milliGRAM(s) IV Intermittent every 12 hours  heparin   Injectable 5000 Unit(s) SubCutaneous every 8 hours  OLANZapine 5 milliGRAM(s) Oral at bedtime  ondansetron Injectable 4 milliGRAM(s) IV Push once    MEDICATIONS  (PRN):  acetaminophen     Tablet .. 650 milliGRAM(s) Oral every 6 hours PRN Temp greater or equal to 38C (100.4F), Mild Pain (1 - 3)  aluminum hydroxide/magnesium hydroxide/simethicone Suspension 30 milliLiter(s) Oral every 4 hours PRN Dyspepsia  melatonin 3 milliGRAM(s) Oral at bedtime PRN Insomnia  polyethylene glycol 3350 17 Gram(s) Oral daily PRN Constipation    ___________  Active diet:  Diet, Regular  ___________________    ==================>> VITAL SIGNS <<==================    T(C): 36.4 (22 @ 12:06), Max: 36.9 (22 @ 16:03)  HR: 67 (22 @ 12:06) (62 - 67)  BP: 101/54 (22 @ 12:06) (93/59 - 109/62)  RR: 18 (22 @ 12:06) (18 - 18)  SpO2: 100% (22 @ 12:06) (97% - 100%)     I&O's Summary    17 Aug 2022 07:01  -  17 Aug 2022 13:52  --------------------------------------------------------  IN: 0 mL / OUT: 400 mL / NET: -400 mL       ==================>> LAB AND IMAGING <<==================                        9.0    7.21  )-----------( 328      ( 17 Aug 2022 06:56 )             30.1        08-    141  |  109<H>  |  30<H>  ----------------------------<  106<H>  3.8   |  21<L>  |  1.36<H>    Ca    10.1      17 Aug 2022 06:56  Phos  3.6     08-  Mg     2.5     -    TPro  8.1  /  Alb  2.6<L>  /  TBili  0.2  /  DBili  x   /  AST  14  /  ALT  14  /  AlkPhos  93  08-15    Creatinine:  1.36  <<==, 1.38  <<==, 1.41  <<==     Urinalysis Basic - ( 16 Aug 2022 21:40 )  Color: Yellow / Appearance: Slightly Turbid / S.015 / pH: x  Gluc: x / Ketone: Negative  / Bili: Negative / Urobili: Negative   Blood: x / Protein: 500 mg/dL / Nitrite: Negative   Leuk Esterase: Moderate / RBC: 10-25 /HPF / WBC 26-50 /HPF   Sq Epi: x / Non Sq Epi: Few /HPF / Bacteria: Moderate /HPF    HgA1C:   (22)          (22)      5.6,   (22)          (22)      5.7    ____________________________    M I C R O B I O L O G Y :    Culture - Urine (collected 15 Aug 2022 19:55)  Source: Clean Catch Clean Catch (Midstream)  Final Report (16 Aug 2022 18:02):    <10,000 CFU/mL Normal Urogenital Vandana    Culture - Blood (collected 15 Aug 2022 19:50)  Source: .Blood Blood-Peripheral  Preliminary Report (16 Aug 2022 22:01):    No growth to date.    Culture - Blood (collected 15 Aug 2022 19:45)  Source: .Blood Blood-Peripheral  Preliminary Report (16 Aug 2022 22:01):    No growth to date.      COVID-19 PCR: NotDetec (08-15-22 @ 19:55)  SARS-CoV-2: NotDetec (22 @ 10:35)    ___________________________________________________________________________________  ===============>>  A S S E S S M E N T   A N D   P L A N <<===============  ------------------------------------------------------------------------------------------    · Assessment	  This is a 56 year old female from home, post menopausal female, with PMH of Anemia, Asthma, Bladder cancer (previously on chemo until 2022), R nephrostomy tube, hyperparathyroidism, bradycardia with PPM coming in UTI.     Problem/Plan - 1:  ·  Problem:  complicated UTI in pt with cancer and nephrostomy tube   UA positive  f/u Urine culture / sensitivitied   on ABx - Cefepime   ID: Dr. Rodriguez.    Problem/Plan - 2:  ·  Problem: Asthma.   ·  Plan: - Pt has a history of Asthma.   - Will continue pts home medication Symbicort.    Problem/Plan - 3:  ·  Problem: Bladder cancer.   ·  Plan: - Pt has a history of bladder cancer with mets to the liver.   - follows  Dr. Renay Trinidad,  - pt has a PET scan scheduled later this month to monitor progress..     Problem/Plan - 4:  ·  Problem: HLD (hyperlipidemia).   ·  Plan: - Pt has a history of HLD.   - Will continue pts home medication Atorvastatin.    Problem/Plan - 5:  ·  Problem: HTN (hypertension).   ·  Plan: - Pt has a history of HTN.   - Currently not on any medication.   - Continue to monitor.    Problem/Plan - 6:  ·  Problem: Prophylactic measure.   ·  Plan: - Heparin - DVT.  encouraged PO intake     --------------------------------------------  Case discussed with pt, RN, NP..   Education given on findings and plan of care  ___________________________  H. NIEVES Ochoa.  Pager: [phone]

## 2022-08-17 NOTE — PROGRESS NOTE ADULT - PROBLEM SELECTOR PLAN 5
CONTROLLED, not on meds  monitor BP Pt has a history of bladder cancer with mets to the liver.   - follows  Dr. Renay Trinidad,

## 2022-08-17 NOTE — PROGRESS NOTE ADULT - PROBLEM SELECTOR PLAN 4
Pt has a history of bladder cancer with mets to the liver.   - follows  Dr. Renay Trinidad, on atorvastatin  c/w home med

## 2022-08-17 NOTE — CONSULT NOTE ADULT - SUBJECTIVE AND OBJECTIVE BOX
Patient is a 56y old  Female from home, post menopausal female, with PMH of Anemia, Asthma, Bladder cancer (previously on chemo until 2022) with  Dr. Renay Trinidad, R nephrostomy tube, hyperparathyroidism, bradycardia with PPM, now presents to the ER for evaluation of fever and chills at home.  She states that her nephrostomy tubes tends to get clogged often and she has been flushing it constantly.  Also she has been feeling lethargic, having Urinary frequency and abdominal pain. On admission, she has no fever, no Leukocytosis but cloudy urine and positive Urine analysis. She has started on Cefepime and the ID consult requested to assist with further evaluation and antibiotic management.      REVIEW OF SYSTEMS: Total of twelve systems have been reviewed with patient and found to be negative unless mentioned in HPI      PAST MEDICAL & SURGICAL HISTORY:  Anemia  Asthma  HLD (hyperlipidemia)  Pacemaker, [employer]  Bladder cancer  HTN (hypertension)  Parathyroid tumor  Liver cyst  Hydronephrosis, has right Nephrostomy tube   Bradycardia  History of renal calculi  H/O myomectomy  Presence of cardiac pacemaker  H/O hydronephrosis  s/p Right Perc nephrostomy tube placement 2021, exchange 2021      SOCIAL HISTORY  Alcohol: Does not drink  Tobacco: Does not smoke  Illicit substance use: None      FAMILY HISTORY: Non contributory to the present illness      ALLERGIES: No Known Allergies      Vital Signs Last 24 Hrs  T(C): 36.4 (17 Aug 2022 12:06), Max: 36.7 (16 Aug 2022 19:44)  T(F): 97.6 (17 Aug 2022 12:06), Max: 98 (16 Aug 2022 19:44)  HR: 67 (17 Aug 2022 12:06) (62 - 67)  BP: 101/54 (17 Aug 2022 12:06) (93/59 - 109/62)  BP(mean): --  RR: 18 (17 Aug 2022 12:06) (18 - 18)  SpO2: 100% (17 Aug 2022 12:06) (97% - 100%)    Parameters below as of 17 Aug 2022 12:06  Patient On (Oxygen Delivery Method): room air        PHYSICAL EXAM:  GENERAL: Not in distress   CHEST/LUNG:  Not using accessory muscles   HEART: s1 and s2 present  ABDOMEN:  Nontender and  Nondistended  : Right nephrostomy tubae in placed   EXTREMITIES: No pedal  edema  CNS: Awake and Alert      LABS:                        9.0    7.21  )-----------( 328      ( 17 Aug 2022 06:56 )             30.1       -    141  |  109<H>  |  30<H>  ----------------------------<  106<H>  3.8   |  21<L>  |  1.36<H>    Ca    10.1      17 Aug 2022 06:56  Phos  3.6     -  Mg     2.5     -    TPro  8.1  /  Alb  2.6<L>  /  TBili  0.2  /  DBili  x   /  AST  14  /  ALT  14  /  AlkPhos  93  08-15        Urinalysis Basic - ( 16 Aug 2022 21:40 )  Color: Yellow / Appearance: Slightly Turbid / S.015 / pH: x  Gluc: x / Ketone: Negative  / Bili: Negative / Urobili: Negative   Blood: x / Protein: 500 mg/dL / Nitrite: Negative   Leuk Esterase: Moderate / RBC: 10-25 /HPF / WBC 26-50 /HPF   Sq Epi: x / Non Sq Epi: Few /HPF / Bacteria: Moderate /HPF        MEDICATIONS  (STANDING):  atorvastatin 40 milliGRAM(s) Oral at bedtime  budesonide  80 MICROgram(s)/formoterol 4.5 MICROgram(s) Inhaler 2 Puff(s) Inhalation two times a day  cefepime   IVPB 1000 milliGRAM(s) IV Intermittent every 12 hours  heparin   Injectable 5000 Unit(s) SubCutaneous every 8 hours  OLANZapine 5 milliGRAM(s) Oral at bedtime  ondansetron Injectable 4 milliGRAM(s) IV Push once    MEDICATIONS  (PRN):  acetaminophen     Tablet .. 650 milliGRAM(s) Oral every 6 hours PRN Temp greater or equal to 38C (100.4F), Mild Pain (1 - 3)  aluminum hydroxide/magnesium hydroxide/simethicone Suspension 30 milliLiter(s) Oral every 4 hours PRN Dyspepsia  melatonin 3 milliGRAM(s) Oral at bedtime PRN Insomnia  polyethylene glycol 3350 17 Gram(s) Oral daily PRN Constipation      RADIOLOGY & ADDITIONAL TESTS:    Culture - Urine (08.15.22 @ 19:55)   Specimen Source: Clean Catch Clean Catch (Midstream)   Culture Results:   <10,000 CFU/mL Normal Urogenital Vandana     COVID-19 PCR (08.15.22 @ 19:55)   COVID-19 PCR: NotDetec:     Culture - Blood (08.15.22 @ 19:50)   Specimen Source: .Blood Blood-Peripheral   Culture Results: No growth to date.     Culture - Blood (08.15.22 @ 19:45)   Specimen Source: .Blood Blood-Peripheral   Culture Results: No growth to date.

## 2022-08-18 NOTE — PROGRESS NOTE ADULT - ASSESSMENT
56 year old female from home, post menopausal female, with PMH of Anemia, Asthma, Bladder cancer (previously on chemo until 5/2022) with  Dr. Renay Trinidad, R nephrostomy tube, hyperparathyroidism, bradycardia with PPM coming in for fevers and chills at home. Admitted for UTI. UA positive started on Abx, cefepime. Dr Rodriguez ID consulted. Ir consulted for nephrostomy check. Pt. also noted to have CRYSTAL, s/p IVF bolus Scr downtrending. pending improved clinical course, and nephrostomy tube change by IR.

## 2022-08-18 NOTE — DIETITIAN INITIAL EVALUATION ADULT - OTHER INFO
Pt visited. Pt seen for Nutrition consult. Pt  appears thin and Cachetic. Pt Reports  Eating good, Ate all of her b fast meal. NKFA. Pt reports before she did not eat meat but now she is eating for her Anemia. + weight loss reported. Pt reports her last weight ( Highest ) 98 LB, Current wt  86 Lb. Weight loss Of ~ 12 lbs x ~ 6 months ( 12%).  NFPE performed. +Loss of Muscle mass Noted. Pt  Refused supplement reports giving her gastritis.  So avoids milk and ice cream and citrus. Offered Ensure plant  based agreed to try. Food choices obtained. F & N dept Notified. Meds reviewed. D/W NP Pt may benefit from PPI ?? D/T Chronic Gastritis. Recent stay @ [facility] Dx Severe malnutrition. Pt visited. Pt seen for Nutrition consult. Pt  appears thin and Cachetic. Pt Reports  Eating good, Ate all of her b fast meal. NKFA. Pt reports before she did not eat meat but now she is eating for her Anemia. + weight loss reported. Pt reports her last weight ( Highest ) 98 LB, Current wt  86 Lb. Weight loss Of ~ 12 lbs x ~ 6 months ( 12%).  NFPE performed. +Loss of Muscle mass Noted. Pt w H/O Bladder ca, her last tx was in May 2022. Pt  Refused supplement reports giving her gastritis.  So avoids milk and ice cream and citrus. Offered Ensure plant  based agreed to try. Food choices obtained. F & N dept Notified. Meds reviewed. D/W NP Pt may benefit from PPI ?? D/T Chronic Gastritis. Recent stay @ [facility] Dx Severe malnutrition.

## 2022-08-18 NOTE — PROGRESS NOTE ADULT - ASSESSMENT
Patient is a 56y old  Female from home, post menopausal female, with PMH of Anemia, Asthma, Bladder cancer (previously on chemo until 5/2022) with  Dr. Renay Trinidad, R nephrostomy tube, hyperparathyroidism, bradycardia with PPM, now presents to the ER for evaluation of fever and chills at home.  She states that her nephrostomy tubes tends to get clogged often and she has been flushing it constantly.  Also she has been feeling lethargic, having Urinary frequency and abdominal pain. On admission, she has no fever, no Leukocytosis but cloudy urine and positive Urine analysis. She has started on Cefepime and the ID consult requested to assist with further evaluation and antibiotic management.    # Complicated UTI- in the setting of Right nephrostomy tube- Urine cx has no growth  # Bladder cancer     would recommend:    1. Monitor kidney function and adjust Abx doses accordingly  2. Continue Cefepime based on previous C & S  3. Pain management as needed  4. Bowel regimen as needed    d/w patient     Attending Attestation:    Spent more than 45 minutes on total encounter, more than 50 % of the visit was spent counseling and/or coordinating care by the Attending physician.   Patient is a 56y old  Female from home, post menopausal female, with PMH of Anemia, Asthma, Bladder cancer (previously on chemo until 5/2022) with  Dr. Renay Trinidad, R nephrostomy tube, hyperparathyroidism, bradycardia with PPM, now presents to the ER for evaluation of fever and chills at home.  She states that her nephrostomy tubes tends to get clogged often and she has been flushing it constantly.  Also she has been feeling lethargic, having Urinary frequency and abdominal pain. On admission, she has no fever, no Leukocytosis but cloudy urine and positive Urine analysis. She has started on Cefepime and the ID consult requested to assist with further evaluation and antibiotic management.    # Complicated UTI- in the setting of Right nephrostomy tube- Urine cx has no growth  # Bladder cancer     would recommend:    1. Urology follow up for changing Nephrostomy  tube  2. Monitor kidney function and adjust Abx doses accordingly  3. Continue Cefepime based on previous C & S  4. Pain management as needed  5. Bowel regimen as needed    d/w patient and covering NP    Attending Attestation:    Spent more than 35 minutes on total encounter, more than 50 % of the visit was spent counseling and/or coordinating care by the Attending physician.

## 2022-08-18 NOTE — DIETITIAN INITIAL EVALUATION ADULT - PERTINENT LABORATORY DATA
08-18    140  |  109<H>  |  33<H>  ----------------------------<  82  4.5   |  21<L>  |  1.38<H>    Ca    10.0      18 Aug 2022 06:56    A1C with Estimated Average Glucose Result: 5.6 % (06-29-22 @ 13:49)  A1C with Estimated Average Glucose Result: 5.7 % (06-28-22 @ 12:08)  A1C with Estimated Average Glucose Result: 5.5 % (01-16-22 @ 18:39)

## 2022-08-18 NOTE — PROGRESS NOTE ADULT - SUBJECTIVE AND OBJECTIVE BOX
Patient for nephrostomy tube exchange in IR tomorrow.  Please keep patient NPO, send early AM labs including CBC, BMP, and PT, INR / PTT.  Hold all anticoagulation, NSAIDS, and antiplatelet medication.

## 2022-08-18 NOTE — PROGRESS NOTE ADULT - ASSESSMENT
_________________________________________________________________________________________  ========>>  M E D I C A L   A T T E N D I N G    F O L L O W  U P  N O T E  <<=========  -----------------------------------------------------------------------------------------------------    - Patient seen and examined by me approximately sixty minutes ago.  - In summary,  JEF HOUSE is a 56y year old woman admitted with pain in back., possible UTI   - Patient today overall doing fairly .. a bit more comfortable, eating fairly..       pt generally uncomfortable, not sleeping much because of pain related to the nephrostomy, feels weak..     ==================>> REVIEW OF SYSTEM <<=================    GEN: no fever, no chills, as above   RESP: no SOB, no cough, no sputum  CVS: no chest pain, no palpitations, no edema  GI: no abdominal pain, no nausea, no constipation, no diarrhea  : no dysuria, no frequency, no hematuria, nephrostomy draining , pt flushes at times   Neuro: no headache, no dizziness  Derm : no itching, no rash    ==================>> PHYSICAL EXAM <<=================    GEN: A&O X 3 , NAD , uncomfortable, calm but tearful at times cachectic   HEENT: NCAT, PERRL, MMM, hearing intact, temporal wasting   Neck: supple , no JVD appreciated  CVS: S1S2 , regular , No M/R/G appreciated  PULM: CTA B/L,  no W/R/R appreciated  ABD.: soft. non tender, non distended,  bowel sounds present  Extrem: intact pulses , no edema   PSYCH : normal mood,  not anxious, as above                                ( Note written / Date of service 08-18-22 )    ==================>> MEDICATIONS <<====================    acetaminophen     Tablet .. 650 milliGRAM(s) Oral once  atorvastatin 40 milliGRAM(s) Oral at bedtime  budesonide  80 MICROgram(s)/formoterol 4.5 MICROgram(s) Inhaler 2 Puff(s) Inhalation two times a day  cefepime   IVPB 1000 milliGRAM(s) IV Intermittent every 12 hours  heparin   Injectable 5000 Unit(s) SubCutaneous every 8 hours  OLANZapine 5 milliGRAM(s) Oral at bedtime  ondansetron Injectable 4 milliGRAM(s) IV Push once    MEDICATIONS  (PRN):  acetaminophen     Tablet .. 650 milliGRAM(s) Oral every 6 hours PRN Temp greater or equal to 38C (100.4F), Mild Pain (1 - 3)  aluminum hydroxide/magnesium hydroxide/simethicone Suspension 30 milliLiter(s) Oral every 4 hours PRN Dyspepsia  melatonin 3 milliGRAM(s) Oral at bedtime PRN Insomnia  polyethylene glycol 3350 17 Gram(s) Oral daily PRN Constipation    ___________  Active diet:  Diet, Regular  ___________________    ==================>> VITAL SIGNS <<==================    Height (cm): 162.6  Weight (kg): 38.6  BMI (kg/m2): 14.6  Vital Signs Last 24 HrsT(C): 36.6 (08-18-22 @ 12:32)  T(F): 97.8 (08-18-22 @ 12:32), Max: 97.8 (08-18-22 @ 12:32)  HR: 69 (08-18-22 @ 12:32) (56 - 69)  BP: 106/50 (08-18-22 @ 12:32)  RR: 18 (08-18-22 @ 12:32) (18 - 20)  SpO2: 100% (08-18-22 @ 12:32) (100% - 100%)       ==================>> LAB AND IMAGING <<==================                        8.4    7.25  )-----------( 311      ( 18 Aug 2022 06:56 )             27.3        08-18    140  |  109<H>  |  33<H>  ----------------------------<  82  4.5   |  21<L>  |  1.38<H>    Ca    10.0      18 Aug 2022 06:56    WBC count:   7.25 <<== ,  7.21 <<== ,  8.10 <<== ,  10.54 <<==   Hemoglobin:   8.4 <<==,  9.0 <<==,  9.4 <<==,  9.6 <<==  platelets:  311 <==, 328 <==, 271 <==, 321 <==    Creatinine:  1.38  <<==, 1.36  <<==, 1.38  <<==, 1.41  <<==  Sodium:   140  <==, 141  <==, 140  <==, 137  <==    ____________________________    M I C R O B I O L O G Y :    Culture - Urine (collected 16 Aug 2022 21:40)  Source: .Urine Nephrostomy - Right  Final Report (18 Aug 2022 09:15):    No growth    Culture - Urine (collected 15 Aug 2022 19:55)  Source: Clean Catch Clean Catch (Midstream)  Final Report (16 Aug 2022 18:02):    <10,000 CFU/mL Normal Urogenital Vandana    Culture - Blood (collected 15 Aug 2022 19:50)  Source: .Blood Blood-Peripheral  Preliminary Report (16 Aug 2022 22:01):    No growth to date.    Culture - Blood (collected 15 Aug 2022 19:45)  Source: .Blood Blood-Peripheral  Preliminary Report (16 Aug 2022 22:01):    No growth to date.    ___________________________________________________________________________________  ===============>>  A S S E S S M E N T   A N D   P L A N <<===============  ------------------------------------------------------------------------------------------    · Assessment	  This is a 56 year old female from home, post menopausal female, with PMH of Anemia, Asthma, Bladder cancer (previously on chemo until 5/2022), R nephrostomy tube, hyperparathyroidism, bradycardia with PPM coming in UTI.     Problem/Plan - 1:  ·  Problem:  complicated UTI in pt with cancer and nephrostomy tube   UA positive  f/u Urine culture / sensitivities   on ABx - Cefepime   ID: Dr. Rodriguez. following     Problem/Plan - 2:  ·  Problem: Asthma.   ·  Plan: - Pt has a history of Asthma.   - Will continue pts home medication Symbicort.    Problem/Plan - 3:  ·  Problem: Bladder cancer.   ·  Plan: - Pt has a history of bladder cancer with mets to the liver.   - follows  Dr. Renay Trinidad,  - pt has a PET scan scheduled later this month to monitor progress..     Problem/Plan - 4:  ·  Problem: HLD (hyperlipidemia).   ·  Plan: - Pt has a history of HLD.   - Will continue pts home medication Atorvastatin.    Problem/Plan - 5:  ·  Problem: HTN (hypertension).   ·  Plan: - Pt has a history of HTN.   - Currently not on any medication.   - Continue to monitor.    Problem/Plan - 6:  ·  Problem: Prophylactic measure.   ·  Plan: - Heparin - DVT.  encouraged PO intake     --------------------------------------------  Case discussed with pt, RN, NP..   Education given on findings and plan of care  ___________________________  H. NIEVES Ochoa.  Pager: [phone]

## 2022-08-18 NOTE — DIETITIAN INITIAL EVALUATION ADULT - ETIOLOGY
Bladder ca, Acute  UTI, Anemia, suspect Inadequate Nutrient Intake PTA, Recent Hospitalization /2022

## 2022-08-18 NOTE — ED ADULT NURSE NOTE - FALLEN IN THE PAST
Subjective   Patient ID: Latonya is a 53 year old female.    Chief Complaint   Patient presents with   • Physical     Patient presents for annual physical exam needs orders for fasting blood work labs placed in the system to be drawn today.  Patient has multiple medical problems to include GERD, HSV-2, chronic pain syndrome, anxiety and depression, hypertension and dermatitis all of which are stable.  She needs refills of all medications sent to the pharmacy on file.  Patient currently denies any headaches, chest pain, shortness of breath or dizziness.  She notes she was recently seen by her cardiologist who ordered echocardiogram and patient has to schedule to have test performed.  Patient also notes that she has scheduled an appointment to see orthopedic surgeon due to chronic pain in the right knee and notes that she may need to have knee replacement.  Patient notes that she is scheduled for mammogram.  No other concerns noted at this time.    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  Latonya has Abnormal TSH; Adjustment reaction with anxiety and depression; Candidiasis of vagina; Anxiety; Arthritis; Chronic pain syndrome; Depression with anxiety; Genital herpes; Gastroesophageal reflux disease without esophagitis; HSV-2 (herpes simplex virus 2) infection; Knee pain, right; Migraine; Status post total left knee replacement; Herpetic vulvovaginitis; Prediabetes; Pure hypercholesterolemia; and Vitamin D deficiency on their problem list.  Latonya has a past surgical history that includes knee scope,aid ant cruciate repair (Bilateral); joint replacement; removal of heel spur (Right); and abdominoplasty 2.5hrs.  Her family history includes Cancer in her maternal grandmother and mother; Diabetes in her mother.  Latonya  reports that she has never smoked. She has never used smokeless tobacco. She reports previous alcohol use. She reports that she does not use drugs.  Latonya has a current medication list which includes the following prescription(s): valacyclovir, ketoconazole, pantoprazole, gabapentin, furosemide, citalopram, aspirin, buspirone, terconazole, potassium chloride, clonazepam, vitamin d (ergocalciferol), valacyclovir, terconazole, acyclovir, melatonin, triamcinolone, potassium chloride, and terconazole.  Latonya   Current Outpatient Medications   Medication Sig Dispense Refill   • terconazole 0.4 % vaginal cream      • potassium chloride (K-TAB) 20 MEQ ER tablet Take 40 mg by mouth daily.     • valACYclovir (VALTREX) 500 MG tablet Take 1 tablet by mouth daily. 30 tablet 3   • ketoconazole (NIZORAL) 2 % shampoo Apply to hair and shampoo as needed. 120 mL 2   • pantoprazole (PROTONIX) 40 MG tablet Take 1 tablet by mouth daily. 90 tablet 1   • gabapentin (NEURONTIN) 300 MG capsule Take 1 capsule by mouth at bedtime. 90 capsule 1   • furosemide (LASIX) 40 MG tablet Take 1 tablet by mouth daily. 90 tablet 3   • citalopram (CeleXA) 40 MG tablet Take 1 tablet by mouth daily. 90 tablet 3   • aspirin (ECOTRIN) 325 MG EC tablet Take 1 tablet by mouth daily. 90 tablet 3   • busPIRone (BUSPAR) 7.5 MG tablet Take 1 tablet by mouth in the morning and 1 tablet in the evening. 120 tablet 3   • clonazePAM (KlonoPIN) 1 MG tablet Take 1 tablet by mouth 2 times daily as needed for Anxiety. 60 tablet 1   • Vitamin D, Ergocalciferol, 1.25 mg (50,000 units) capsule Take 1 capsule by mouth 1 day a week. 12 capsule 3   • valACYclovir (VALTREX) 500 MG tablet Take 1 tablet by mouth daily. 90 tablet 3   • terconazole 0.8 % vaginal cream Place 1 applicator vaginally daily.     • acyclovir (ZOVIRAX) 400 MG tablet TAKE 1 TABLET BY MOUTH TWICE A DAY 60 tablet 11   • Melatonin 5 MG Cap Take 5 mg by mouth as needed.      • triamcinolone (ARISTOCORT) 0.1 %  cream Apply topically 2 times daily.     • potassium CHLORIDE (KLOR-CON M) 20 MEQ cody ER tablet Take 1 tablet by mouth 2 times daily. (Patient taking differently: Take 20 mEq by mouth daily.) 30 tablet 2   • terconazole 0.8 % vaginal cream Place 1 applicator vaginally nightly. 20 g 3     No current facility-administered medications for this visit.     Latonya is allergic to cat dander and no known allergies.    Review of Systems   Constitutional: Negative for activity change, appetite change, chills, diaphoresis, fatigue and fever.   HENT: Negative for congestion, ear discharge, ear pain, nosebleeds, rhinorrhea, sinus pressure, sinus pain, sore throat and voice change.    Eyes: Negative for photophobia, pain, redness and itching.   Respiratory: Negative for apnea, cough, chest tightness, shortness of breath and wheezing.    Cardiovascular: Negative for chest pain, palpitations and leg swelling.   Gastrointestinal: Negative for abdominal pain, blood in stool, constipation, diarrhea, nausea and vomiting.   Genitourinary: Negative for difficulty urinating, dysuria, flank pain, frequency, menstrual problem, pelvic pain, urgency, vaginal bleeding, vaginal discharge and vaginal pain.   Musculoskeletal: Positive for arthralgias and joint swelling. Negative for back pain, gait problem, myalgias and neck pain.   Skin: Negative for rash and wound.   Neurological: Negative for dizziness, syncope, weakness, light-headedness, numbness and headaches.   Psychiatric/Behavioral: Positive for dysphoric mood. Negative for behavioral problems, confusion and decreased concentration. The patient is nervous/anxious. The patient is not hyperactive.    All other systems reviewed and are negative.    Objective   Physical Exam  Vitals and nursing note reviewed.   Constitutional:       General: She is not in acute distress.     Appearance: Normal appearance. She is obese. She is not ill-appearing, toxic-appearing or diaphoretic.   HENT:       Head: Normocephalic and atraumatic.      Right Ear: Tympanic membrane, ear canal and external ear normal. There is no impacted cerumen.      Left Ear: Tympanic membrane, ear canal and external ear normal. There is no impacted cerumen.      Nose: Nose normal. No congestion or rhinorrhea.      Mouth/Throat:      Mouth: Mucous membranes are moist.      Pharynx: Oropharynx is clear. No oropharyngeal exudate or posterior oropharyngeal erythema.      Neck: Normal range of motion and neck supple. No rigidity or tenderness.   Eyes:      General: No scleral icterus.        Right eye: No discharge.         Left eye: No discharge.      Extraocular Movements: Extraocular movements intact.      Conjunctiva/sclera: Conjunctivae normal.      Pupils: Pupils are equal, round, and reactive to light.   Neck:      Vascular: No carotid bruit.   Cardiovascular:      Rate and Rhythm: Normal rate and regular rhythm.      Pulses: Normal pulses.      Heart sounds: Normal heart sounds. No murmur heard.    No friction rub. No gallop.   Pulmonary:      Effort: Pulmonary effort is normal. No respiratory distress.      Breath sounds: Normal breath sounds. No stridor. No wheezing, rhonchi or rales.   Chest:      Chest wall: No tenderness.   Lymphadenopathy:      Cervical: No cervical adenopathy.   Neurological:      General: No focal deficit present.      Mental Status: She is alert and oriented to person, place, and time. Mental status is at baseline.      Cranial Nerves: No cranial nerve deficit.      Sensory: No sensory deficit.   Psychiatric:         Mood and Affect: Mood normal.         Behavior: Behavior normal.       Assessment   Problem List Items Addressed This Visit        Gastrointestinal and Abdominal    Gastroesophageal reflux disease without esophagitis-stable-refill medications below.  Monitor clinically.    Relevant Medications    pantoprazole (PROTONIX) 40 MG tablet       Infectious Diseases    HSV-2 (herpes simplex virus 2)  infection-stable-refill medications below.  Monitor clinically.    Relevant Medications    valACYclovir (VALTREX) 500 MG tablet       Mental Health    Adjustment reaction with anxiety and depression-stable-refill medications below.  Monitor clinically.    Relevant Medications    citalopram (CeleXA) 40 MG tablet    busPIRone (BUSPAR) 7.5 MG tablet    clonazePAM (KlonoPIN) 1 MG tablet       Neuro    Chronic pain syndrome-stable-refill medications below.  Monitor clinically.    Relevant Medications    gabapentin (NEURONTIN) 300 MG capsule      Other Visit Diagnoses     Annual physical exam    -  Primary-benign except as noted-advised on healthy diet and exercise tolerated; draw labs below.  Monitor clinically.    Relevant Orders    THYROID STIMULATING HORMONE    URINALYSIS WITH MICROSCOPY & CULTURE IF INDICATED    GLYCOHEMOGLOBIN    LIPID PANEL WITH REFLEX    MICROALBUMIN URINE RANDOM    CBC WITH DIFFERENTIAL    COMPREHENSIVE METABOLIC PANEL    Colon cancer screening    -we will give fecal occult blood test for screening protocol.  Will give referral to gastroenterology for colonoscopy consultation.    Relevant Orders    OCCULT BLOOD - FIT    SERVICE TO GASTROENTEROLOGY    Depression screening    -patient has a PHQ 2 score of 0 which is negative on the depression scale.      Exercise counseling    -advised on healthy diet and exercise tolerated; monitor clinically.      Dietary counseling    -advised on healthy diet and exercise tolerated; monitor clinically.      Need for shingles vaccine    -we will give shingles vaccination as per vaccine schedule.  Monitor clinically.    Relevant Orders    ZOSTER SHINGLES RECOMBINANT ADJUVANTED IM(SHINGRIX) (Completed)    Dermatitis fungal    -stable-refill medications below.  Monitor clinically.    Relevant Medications    ketoconazole (NIZORAL) 2 % shampoo    Benign essential HTN    -stable-refill medications below.  Monitor clinically.    Relevant Medications    furosemide  (LASIX) 40 MG tablet    aspirin (ECOTRIN) 325 MG EC tablet         no

## 2022-08-18 NOTE — PROGRESS NOTE ADULT - PROBLEM SELECTOR PLAN 1
p/w dysuria, frequency  UA positive  Urine culture negative 8/15  c/w- Cefepime   ID: Dr. Rodriguez. p/w dysuria, frequency  UA positive  Urine culture negative 8/15  rt nephrostomy culture 8/16 negative  bcx 8/15 negative  c/w- Cefepime   pending ID recc for duration of abx  ID: Dr. Rodriguez.

## 2022-08-18 NOTE — PROGRESS NOTE ADULT - SUBJECTIVE AND OBJECTIVE BOX
Patient is a 56y old  Female who presents with a chief complaint of UTI (17 Aug 2022 16:39)      INTERVAL HPI/OVERNIGHT EVENTS: no overnight events    I&O's Summary    17 Aug 2022 07:01  -  18 Aug 2022 07:00  --------------------------------------------------------  IN: 50 mL / OUT: 700 mL / NET: -650 mL      Vital Signs Last 24 Hrs  T(C): 36.3 (18 Aug 2022 05:30), Max: 36.4 (17 Aug 2022 12:06)  T(F): 97.3 (18 Aug 2022 05:30), Max: 97.6 (17 Aug 2022 12:06)  HR: 63 (18 Aug 2022 05:30) (56 - 67)  BP: 106/60 (18 Aug 2022 05:30) (101/54 - 112/63)  BP(mean): --  RR: 19 (18 Aug 2022 05:30) (18 - 20)  SpO2: 100% (18 Aug 2022 05:30) (100% - 100%)    Parameters below as of 18 Aug 2022 05:30  Patient On (Oxygen Delivery Method): room air      PAST MEDICAL & SURGICAL HISTORY:  Anemia      Asthma      HLD (hyperlipidemia)      Pacemaker  [facility]      Bladder cancer      HTN (hypertension)      Parathyroid tumor      Liver cyst      Hydronephrosis  has right Nephrostomy tube - dressing intact      Bradycardia      History of renal calculi      H/O myomectomy      Presence of cardiac pacemaker      H/O hydronephrosis  s/p Right Perc nephrostomy tube placement 2021, exchange 2021          SOCIAL HISTORY  Alcohol:  Tobacco:  Illicit substance use:      FAMILY HISTORY:      LABS:                        8.4    7.25  )-----------( 311      ( 18 Aug 2022 06:56 )             27.3     08-18    140  |  109<H>  |  33<H>  ----------------------------<  82  4.5   |  21<L>  |  1.38<H>    Ca    10.0      18 Aug 2022 06:56        Urinalysis Basic - ( 16 Aug 2022 21:40 )    Color: Yellow / Appearance: Slightly Turbid / S.015 / pH: x  Gluc: x / Ketone: Negative  / Bili: Negative / Urobili: Negative   Blood: x / Protein: 500 mg/dL / Nitrite: Negative   Leuk Esterase: Moderate / RBC: 10-25 /HPF / WBC 26-50 /HPF   Sq Epi: x / Non Sq Epi: Few /HPF / Bacteria: Moderate /HPF      CAPILLARY BLOOD GLUCOSE        Urinalysis Basic - ( 16 Aug 2022 21:40 )    Color: Yellow / Appearance: Slightly Turbid / S.015 / pH: x  Gluc: x / Ketone: Negative  / Bili: Negative / Urobili: Negative   Blood: x / Protein: 500 mg/dL / Nitrite: Negative   Leuk Esterase: Moderate / RBC: 10-25 /HPF / WBC 26-50 /HPF   Sq Epi: x / Non Sq Epi: Few /HPF / Bacteria: Moderate /HPF        MEDICATIONS  (STANDING):  atorvastatin 40 milliGRAM(s) Oral at bedtime  budesonide  80 MICROgram(s)/formoterol 4.5 MICROgram(s) Inhaler 2 Puff(s) Inhalation two times a day  cefepime   IVPB 1000 milliGRAM(s) IV Intermittent every 12 hours  heparin   Injectable 5000 Unit(s) SubCutaneous every 8 hours  OLANZapine 5 milliGRAM(s) Oral at bedtime  ondansetron Injectable 4 milliGRAM(s) IV Push once    MEDICATIONS  (PRN):  acetaminophen     Tablet .. 650 milliGRAM(s) Oral every 6 hours PRN Temp greater or equal to 38C (100.4F), Mild Pain (1 - 3)  aluminum hydroxide/magnesium hydroxide/simethicone Suspension 30 milliLiter(s) Oral every 4 hours PRN Dyspepsia  melatonin 3 milliGRAM(s) Oral at bedtime PRN Insomnia  polyethylene glycol 3350 17 Gram(s) Oral daily PRN Constipation      REVIEW OF SYSTEMS:  CONSTITUTIONAL: No fever  EYES: No eye pain, visual disturbances,  ENMT:  No difficulty hearing, No sinus or throat pain  NECK: No pain or stiffness  RESPIRATORY: No cough, wheezing, chills or  No shortness of breath  CARDIOVASCULAR: No chest pain, palpitations, dizziness, or leg swelling  GASTROINTESTINAL: + right sided abdominal pain. No nausea, vomiting, No diarrhea or constipation.   GENITOURINARY: + frequency  NEUROLOGICAL: No headaches  SKIN: No rashes, or lesions   MUSCULOSKELETAL: No joint pain or swelling; + rt flank pain    RADIOLOGY & ADDITIONAL TESTS:< from: Xray Chest 1 View- PORTABLE-Urgent (22 @ 01:40) >    ACC: [id number] EXAM:  XR CHEST PORTABLE URGENT 1V                          PROCEDURE DATE:  2022          INTERPRETATION:  INDICATION: Back pain; assess for chest process.    COMPARISON: 22 plain chest. 22 CT scan of chest.    Technique: AP radiograph of the chest    FINDINGS:  Left-sided pacemaker in place.  Heart/Vascular:  Cardiomediastinal silhouette is within normal limits.  Pulmonary: Again noted are bilateral lung nodules. A representative   nodule in the lower left lobe measures 3.0xm x 1.9cm. No focal   infiltrate.. No pleural effusion or pneumothorax.  Bones: No acute bony finding.    Impression:  Redemonstration of bilateral metastatic lung nodules.        --- End of Report ---      RUPA OLIVAREZ MD; Attending Radiologist  This document has been electronically signed. Aug 18 2022  9:13AM    < end of copied text >      Imaging Personally Reviewed:  [x ] YES  [ ] NO    Consultant(s) Notes Reviewed:  [ x] YES  [ ] NO    PHYSICAL EXAM:  GENERAL: NAD  HEAD:  Atraumatic, Normocephalic  EYES: conjunctiva and sclera clear  ENMT:  Moist mucous membranes  NECK: Supple, No JVD, Normal thyroid  NERVOUS SYSTEM:  Alert & Oriented X3, Good concentration  CHEST/LUNG: CTA bilaterally; No rales, rhonchi, wheezing  HEART: Regular rate and rhythm  ABDOMEN: Soft, Nontender, Nondistended; Bowel sounds present  EXTREMITIES:  2+ Peripheral Pulses, No clubbing, cyanosis, or edema  SKIN: No rashes or lesions    Care Collaborated Discussed with Consultants/Other Providers [x ] YES  [ ] NO

## 2022-08-18 NOTE — DIETITIAN INITIAL EVALUATION ADULT - PERTINENT MEDS FT
MEDICATIONS  (STANDING):  atorvastatin 40 milliGRAM(s) Oral at bedtime  budesonide  80 MICROgram(s)/formoterol 4.5 MICROgram(s) Inhaler 2 Puff(s) Inhalation two times a day  cefepime   IVPB 1000 milliGRAM(s) IV Intermittent every 12 hours  heparin   Injectable 5000 Unit(s) SubCutaneous every 8 hours  OLANZapine 5 milliGRAM(s) Oral at bedtime  ondansetron Injectable 4 milliGRAM(s) IV Push once    MEDICATIONS  (PRN):  acetaminophen     Tablet .. 650 milliGRAM(s) Oral every 6 hours PRN Temp greater or equal to 38C (100.4F), Mild Pain (1 - 3)  aluminum hydroxide/magnesium hydroxide/simethicone Suspension 30 milliLiter(s) Oral every 4 hours PRN Dyspepsia  melatonin 3 milliGRAM(s) Oral at bedtime PRN Insomnia  polyethylene glycol 3350 17 Gram(s) Oral daily PRN Constipation

## 2022-08-18 NOTE — PROGRESS NOTE ADULT - SUBJECTIVE AND OBJECTIVE BOX
Patient is seen and examined at the bed side, is afebrile.        REVIEW OF SYSTEMS: All other review systems are negative        ALLERGIES: No Known Allergies      Vital Signs Last 24 Hrs  T(C): 36.6 (18 Aug 2022 12:32), Max: 36.6 (18 Aug 2022 12:32)  T(F): 97.8 (18 Aug 2022 12:32), Max: 97.8 (18 Aug 2022 12:32)  HR: 69 (18 Aug 2022 12:32) (56 - 69)  BP: 106/50 (18 Aug 2022 12:32) (106/50 - 112/63)  BP(mean): --  RR: 18 (18 Aug 2022 12:32) (18 - 20)  SpO2: 100% (18 Aug 2022 12:) (100% - 100%)    Parameters below as of 18 Aug 2022 12:32  Patient On (Oxygen Delivery Method): room air        PHYSICAL EXAM:  GENERAL: Not in distress   CHEST/LUNG:  Not using accessory muscles   HEART: s1 and s2 present  ABDOMEN:  Nontender and  Nondistended  : Right nephrostomy tubae in placed   EXTREMITIES: No pedal  edema  CNS: Awake and Alert      LABS:                        8.4    7.25  )-----------( 311      ( 18 Aug 2022 06:56 )             27.3                           9.0    7.21  )-----------( 328      ( 17 Aug 2022 06:56 )             30.1         08-18    140  |  109<H>  |  33<H>  ----------------------------<  82  4.5   |  21<L>  |  1.38<H>    Ca    10.0      18 Aug 2022 06:56      08-17    141  |  109<H>  |  30<H>  ----------------------------<  106<H>  3.8   |  21<L>  |  1.36<H>    Ca    10.1      17 Aug 2022 06:56  Phos  3.6     08-16  Mg     2.5     08-16    TPro  8.1  /  Alb  2.6<L>  /  TBili  0.2  /  DBili  x   /  AST  14  /  ALT  14  /  AlkPhos  93  08-15        Urinalysis Basic - ( 16 Aug 2022 21:40 )  Color: Yellow / Appearance: Slightly Turbid / S.015 / pH: x  Gluc: x / Ketone: Negative  / Bili: Negative / Urobili: Negative   Blood: x / Protein: 500 mg/dL / Nitrite: Negative   Leuk Esterase: Moderate / RBC: 10-25 /HPF / WBC 26-50 /HPF   Sq Epi: x / Non Sq Epi: Few /HPF / Bacteria: Moderate /HPF        MEDICATIONS  (STANDING):    atorvastatin 40 milliGRAM(s) Oral at bedtime  budesonide  80 MICROgram(s)/formoterol 4.5 MICROgram(s) Inhaler 2 Puff(s) Inhalation two times a day  cefepime   IVPB 1000 milliGRAM(s) IV Intermittent every 12 hours  heparin   Injectable 5000 Unit(s) SubCutaneous every 8 hours  OLANZapine 5 milliGRAM(s) Oral at bedtime  ondansetron Injectable 4 milliGRAM(s) IV Push once  pantoprazole    Tablet 40 milliGRAM(s) Oral before breakfast      RADIOLOGY & ADDITIONAL TESTS:    Culture - Urine (08.15.22 @ 19:55)   Specimen Source: Clean Catch Clean Catch (Midstream)   Culture Results:   <10,000 CFU/mL Normal Urogenital Vandana     COVID-19 PCR (08.15.22 @ 19:55)   COVID-19 PCR: NotDetec:     Culture - Blood (08.15.22 @ 19:50)   Specimen Source: .Blood Blood-Peripheral   Culture Results: No growth to date.     Culture - Blood (08.15.22 @ 19:45)   Specimen Source: .Blood Blood-Peripheral   Culture Results: No growth to date.              Patient is seen and examined at the bed side, is afebrile.  She is feeling little better and tolerating cefepime well.       REVIEW OF SYSTEMS: All other review systems are negative      ALLERGIES: No Known Allergies      Vital Signs Last 24 Hrs  T(C): 36.6 (18 Aug 2022 12:32), Max: 36.6 (18 Aug 2022 12:32)  T(F): 97.8 (18 Aug 2022 12:32), Max: 97.8 (18 Aug 2022 12:32)  HR: 69 (18 Aug 2022 12:32) (56 - 69)  BP: 106/50 (18 Aug 2022 12:32) (106/50 - 112/63)  BP(mean): --  RR: 18 (18 Aug 2022 12:32) (18 - 20)  SpO2: 100% (18 Aug 2022 12:32) (100% - 100%)    Parameters below as of 18 Aug 2022 12:32  Patient On (Oxygen Delivery Method): room air        PHYSICAL EXAM:  GENERAL: Not in distress   CHEST/LUNG:  Not using accessory muscles   HEART: s1 and s2 present  ABDOMEN:  Nontender and  Nondistended  : Right nephrostomy tubae in placed   EXTREMITIES: No pedal  edema  CNS: Awake and Alert      LABS:                        8.4    7.25  )-----------( 311      ( 18 Aug 2022 06:56 )             27.3                           9.0    7.21  )-----------( 328      ( 17 Aug 2022 06:56 )             30.1         08-18    140  |  109<H>  |  33<H>  ----------------------------<  82  4.5   |  21<L>  |  1.38<H>    Ca    10.0      18 Aug 2022 06:56      08-17    141  |  109<H>  |  30<H>  ----------------------------<  106<H>  3.8   |  21<L>  |  1.36<H>    Ca    10.1      17 Aug 2022 06:56  Phos  3.6     08-16  Mg     2.5     08-16    TPro  8.1  /  Alb  2.6<L>  /  TBili  0.2  /  DBili  x   /  AST  14  /  ALT  14  /  AlkPhos  93  08-15        Urinalysis Basic - ( 16 Aug 2022 21:40 )  Color: Yellow / Appearance: Slightly Turbid / S.015 / pH: x  Gluc: x / Ketone: Negative  / Bili: Negative / Urobili: Negative   Blood: x / Protein: 500 mg/dL / Nitrite: Negative   Leuk Esterase: Moderate / RBC: 10-25 /HPF / WBC 26-50 /HPF   Sq Epi: x / Non Sq Epi: Few /HPF / Bacteria: Moderate /HPF        MEDICATIONS  (STANDING):    atorvastatin 40 milliGRAM(s) Oral at bedtime  budesonide  80 MICROgram(s)/formoterol 4.5 MICROgram(s) Inhaler 2 Puff(s) Inhalation two times a day  cefepime   IVPB 1000 milliGRAM(s) IV Intermittent every 12 hours  heparin   Injectable 5000 Unit(s) SubCutaneous every 8 hours  OLANZapine 5 milliGRAM(s) Oral at bedtime  ondansetron Injectable 4 milliGRAM(s) IV Push once  pantoprazole    Tablet 40 milliGRAM(s) Oral before breakfast      RADIOLOGY & ADDITIONAL TESTS:    Culture - Urine (08.15.22 @ 19:55)   Specimen Source: Clean Catch Clean Catch (Midstream)   Culture Results:   <10,000 CFU/mL Normal Urogenital Vandana     COVID-19 PCR (08.15.22 @ 19:55)   COVID-19 PCR: NotDetec:     Culture - Blood (08.15.22 @ 19:50)   Specimen Source: .Blood Blood-Peripheral   Culture Results: No growth to date.     Culture - Blood (08.15.22 @ 19:45)   Specimen Source: .Blood Blood-Peripheral   Culture Results: No growth to date.

## 2022-08-19 NOTE — PROGRESS NOTE ADULT - ASSESSMENT
56 year old female from home, post menopausal female, with PMH of Anemia, Asthma, Bladder cancer (previously on chemo until 5/2022) with  Dr. Renay Trinidad, R nephrostomy tube, hyperparathyroidism, bradycardia with PPM coming in for fevers and chills at home. Admitted for UTI. UA positive started on Abx, cefepime. Dr Jennifer CALIX consulted. renal US- b/l hydronephrosis, rt kidney stone and nephrostomy tube and left cyst. Ir consulted for nephrostomy tube- plan to change tube 8/22/22 under anesthesia. Pt. also noted to have CRYSTAL, s/p IVF bolus, worsening kidney fnx- Dr. Murcia consulted- nephro.

## 2022-08-19 NOTE — CONSULT NOTE ADULT - SUBJECTIVE AND OBJECTIVE BOX
Full note to follow.    STAT repeat labs- i'm not sure they are correct. [facility] NEPHROLOGY- CONSULTATION NOTE    Patient is a 56y Female with hx bladder cancer (on chemo until 2022) with  Dr. Renay Trinidad, R nephrostomy tube, kidney stones, parathyroid tumor, hyperparathyroidism, bradycardia with PPM who presented to the hospital with fever and was found to have a UTI.  She was also found to have R nephrostomy tube malpositioned and has a planned exchange on .  This morning, pt was found to have sodium elevated at 160 and creatinine elevate to 1.4 (baseline creatinine ~1).    Pt feels okay overall, no pain.    PAST MEDICAL & SURGICAL HISTORY:  Anemia      Asthma      HLD (hyperlipidemia)      Pacemaker  [employer]      Bladder cancer      HTN (hypertension)      Parathyroid tumor      Liver cyst      Hydronephrosis  has right Nephrostomy tube - dressing intact      Bradycardia      History of renal calculi      H/O myomectomy      Presence of cardiac pacemaker      H/O hydronephrosis  s/p Right Perc nephrostomy tube placement 2021, exchange 2021        No Known Allergies    Home Medications Reviewed  Hospital Medications:   MEDICATIONS  (STANDING):  atorvastatin 40 milliGRAM(s) Oral at bedtime  budesonide  80 MICROgram(s)/formoterol 4.5 MICROgram(s) Inhaler 2 Puff(s) Inhalation two times a day  cefepime   IVPB 1000 milliGRAM(s) IV Intermittent every 12 hours  dextrose 5%. 1000 milliLiter(s) (70 mL/Hr) IV Continuous <Continuous>  heparin   Injectable 5000 Unit(s) SubCutaneous every 8 hours  OLANZapine 5 milliGRAM(s) Oral at bedtime  ondansetron Injectable 4 milliGRAM(s) IV Push once  pantoprazole    Tablet 40 milliGRAM(s) Oral before breakfast    SOCIAL HISTORY:  Denies ETOh,Smoking,   FAMILY HISTORY:  Family history of prostate cancer (Father)    Family history of CHF (congestive heart failure) (Father)    Family history of atrial fibrillation (Father)      REVIEW OF SYSTEMS:  CONSTITUTIONAL: No weakness, fevers or chills  EYES/ENT: No visual changes;  No vertigo or throat pain   NECK: No pain or stiffness  RESPIRATORY: No cough, wheezing, hemoptysis; No shortness of breath  CARDIOVASCULAR: No chest pain or palpitations.  GASTROINTESTINAL: No abdominal or epigastric pain. No nausea, vomiting, or hematemesis; No diarrhea or constipation. No melena or hematochezia.  GENITOURINARY: No dysuria, frequency, foamy urine, urinary urgency, incontinence or hematuria  NEUROLOGICAL: No numbness or weakness  SKIN: No itching, burning, rashes, or lesions   VASCULAR: No bilateral lower extremity edema.   All other review of systems is negative unless indicated above.    VITALS:  T(F): 97.5 (22 @ 20:50), Max: 97.8 (22 @ 12:48)  HR: 64 (22 @ 20:50)  BP: 108/58 (22 @ 20:50)  RR: 20 (22 @ 20:50)  SpO2: 100% (22 @ 20:50)  Wt(kg): --     @ 07:01  -   @ 07:00  --------------------------------------------------------  IN: 0 mL / OUT: 200 mL / NET: -200 mL     @ 07:01  -   @ 21:09  --------------------------------------------------------  IN: 480 mL / OUT: 150 mL / NET: 330 mL          PHYSICAL EXAM:  Constitutional: NAD, somewhat cachectic  HEENT: anicteric sclera, oropharynx clear, MMM, muscle wasting at temples  Neck: No JVD  Respiratory: CTAB, no wheezes, rales or rhonchi  Cardiovascular: S1, S2, RRR  Gastrointestinal: BS+, soft, NT/ND  Extremities: No cyanosis or clubbing. No peripheral edema  Neurological: A/O x 3, no focal deficits  Psychiatric: Normal mood, normal affect  : No CVA tenderness. No toth. + R nephrostomy tube without urine.  Skin: No rashes      LABS:    140 <--, 161 <--, 140 <--, 141 <--, 140 <--, 137 <--  140  |  108  |  41<H>  ----------------------------<  103<H>  3.8   |  25  |  1.49<H>    Ca    10.0      19 Aug 2022 13:50  Phos  3.4       Mg     2.5           Creatinine Trend: 1.49 <--, 1.40 <--, 1.38 <--, 1.36 <--, 1.38 <--, 1.41 <--                        9.0    7.38  )-----------( 370      ( 19 Aug 2022 07:03 )             29.2     Urine Studies:  Urinalysis Basic - ( 16 Aug 2022 21:40 )    Color: Yellow / Appearance: Slightly Turbid / S.015 / pH:   Gluc:  / Ketone: Negative  / Bili: Negative / Urobili: Negative   Blood:  / Protein: 500 mg/dL / Nitrite: Negative   Leuk Esterase: Moderate / RBC: 10-25 /HPF / WBC 26-50 /HPF   Sq Epi:  / Non Sq Epi: Few /HPF / Bacteria: Moderate /HPF        RADIOLOGY & ADDITIONAL STUDIES:    < from: US Renal (22 @ 19:21) >    ACC: [id number] EXAM:  US KIDNEY(S)                          PROCEDURE DATE:  2022          INTERPRETATION:  CLINICAL INFORMATION: Acute kidney injury    COMPARISON: No prior renal ultrasounds available for comparison.    TECHNIQUE: Sonography of the kidneys and bladder.    FINDINGS:  Right kidney: 10.1 cm. Mild right hydronephrosis. 4 mm nonobstructive   calculus in the lower pole of the right kidney. No right renal mass is   identified. A right nephrostomy tube is present.    Left kidney:12.7 cm. 2 lower pole complex left renal cysts measuring 3.5   x 4.0 x 4.3 cm and 3.7 x 3.9 x 2.7 cm with solid components or debris.   Moderate to severe hydronephrosis. No evidence for a calculus in the left   kidney.    Urinary bladder: Collapsed    IMPRESSION:  Bilateral hydronephrosis, mild on the right and moderate to severe on the   left.    A right nephrostomy tube is present.    4 mm nonobstructive calculus in the right kidney.    2. Complex cysts with the solid components or debris in the left kidney.   If clinically indicated, abdominal MR without and with IV contrast may be   pursued for further evaluation.    --- End of Report ---            MORRIS HIGGINS MD; Attending Radiologist  This document has been electronically signed. Aug 18 2022  8:48PM    < end of copied text >

## 2022-08-19 NOTE — PROGRESS NOTE ADULT - PROBLEM SELECTOR PLAN 1
p/w dysuria, frequency  UA positive  Urine culture negative 8/15  rt nephrostomy culture 8/16 negative  bcx 8/15 negative  c/w- Cefepime   pending ID recc for duration of abx  ID: Dr. Rodriguez.

## 2022-08-19 NOTE — PROGRESS NOTE ADULT - ASSESSMENT
_________________________________________________________________________________________  ========>>  M E D I C A L   A T T E N D I N G    F O L L O W  U P  N O T E  <<=========  -----------------------------------------------------------------------------------------------------    - Patient seen and examined by me approximately sixty minutes ago.  - In summary,  JEF HOUSE is a 56y year old woman admitted with pain in back., possible UTI   - Patient today overall doing fairly .. a bit more comfortable, eating fairly..       pt generally uncomfortable, not sleeping much because of pain related to the nephrostomy, feels weak..       pt was planned for Nephrostomy tube change today but unfortunately appears anesthesia was not arrnaged as usual for pt >> now postponed to Monday :: pt understandably upset    ==================>> REVIEW OF SYSTEM <<=================    GEN: no fever, no chills, as above , +  pain in Rt flank and back   RESP: no SOB, no cough, no sputum  CVS: no chest pain, no palpitations, no edema  GI: no abdominal pain, no nausea, no constipation, no diarrhea  : no dysuria, no frequency, no hematuria, nephrostomy draining , pt flushes many times during the day for comfort   Neuro: no headache, no dizziness  Derm : no itching, no rash    ==================>> PHYSICAL EXAM <<=================    GEN: A&O X 3 , NAD , uncomfortable, calm but tearful at times cachectic   HEENT: NCAT, PERRL, MMM, hearing intact, temporal wasting   Neck: supple , no JVD appreciated  CVS: S1S2 , regular , No M/R/G appreciated  PULM: CTA B/L,  no W/R/R appreciated  ABD.: soft. non tender, non distended,  bowel sounds present  Extrem: intact pulses , no edema   PSYCH : normal mood,  not anxious, as above                              ( Note written / Date of service 08-19-22 )    ==================>> MEDICATIONS <<====================    atorvastatin 40 milliGRAM(s) Oral at bedtime  budesonide  80 MICROgram(s)/formoterol 4.5 MICROgram(s) Inhaler 2 Puff(s) Inhalation two times a day  cefepime   IVPB 1000 milliGRAM(s) IV Intermittent every 12 hours  dextrose 5%. 1000 milliLiter(s) IV Continuous <Continuous>  heparin   Injectable 5000 Unit(s) SubCutaneous every 8 hours  OLANZapine 5 milliGRAM(s) Oral at bedtime  ondansetron Injectable 4 milliGRAM(s) IV Push once  pantoprazole    Tablet 40 milliGRAM(s) Oral before breakfast    MEDICATIONS  (PRN):  acetaminophen     Tablet .. 650 milliGRAM(s) Oral every 6 hours PRN Temp greater or equal to 38C (100.4F), Mild Pain (1 - 3)  aluminum hydroxide/magnesium hydroxide/simethicone Suspension 30 milliLiter(s) Oral every 4 hours PRN Dyspepsia  melatonin 3 milliGRAM(s) Oral at bedtime PRN Insomnia  polyethylene glycol 3350 17 Gram(s) Oral daily PRN Constipation    ___________  Active diet:  Diet, Regular  ___________________    ==================>> VITAL SIGNS <<==================    Vital Signs Last 24 HrsT(C): 36.4 (08-19-22 @ 05:02)  T(F): 97.6 (08-19-22 @ 05:02), Max: 97.8 (08-18-22 @ 12:32)  HR: 61 (08-19-22 @ 05:02) (61 - 69)  BP: 112/54 (08-19-22 @ 05:02)  RR: 19 (08-19-22 @ 05:02) (18 - 20)  SpO2: 100% (08-19-22 @ 05:02) (100% - 100%)       ==================>> LAB AND IMAGING <<==================                        9.0    7.38  )-----------( 370      ( 19 Aug 2022 07:03 )             29.2        08-19    161<HH>  |  126<H>  |  36<H>  ----------------------------<  84  4.6   |  21<L>  |  1.40<H>    Ca    10.7<H>      19 Aug 2022 07:03  Phos  3.5     08-19  Mg     2.5     08-19      WBC count:   7.38 <<== ,  7.25 <<== ,  7.21 <<== ,  8.10 <<== ,  10.54 <<==   Hemoglobin:   9.0 <<==,  8.4 <<==,  9.0 <<==,  9.4 <<==,  9.6 <<==  platelets:  370 <==, 311 <==, 328 <==, 271 <==, 321 <==    Creatinine:  1.40  <<==, 1.38  <<==, 1.36  <<==, 1.38  <<==, 1.41  <<==  Sodium:   161  <==, 140  <==, 141  <==, 140  <==, 137  <==    ____________________________    M I C R O B I O L O G Y :    Culture - Urine (collected 16 Aug 2022 21:40)  Source: .Urine Nephrostomy - Right  Final Report (18 Aug 2022 09:15):    No growth    Culture - Urine (collected 15 Aug 2022 19:55)  Source: Clean Catch Clean Catch (Midstream)  Final Report (16 Aug 2022 18:02):    <10,000 CFU/mL Normal Urogenital Vandana    Culture - Blood (collected 15 Aug 2022 19:50)  Source: .Blood Blood-Peripheral  Preliminary Report (16 Aug 2022 22:01):    No growth to date.    Culture - Blood (collected 15 Aug 2022 19:45)  Source: .Blood Blood-Peripheral  Preliminary Report (16 Aug 2022 22:01):    No growth to date.        COVID-19 PCR: NotDetec (08-15-22 @ 19:55)  SARS-CoV-2: NotDetec (07-31-22 @ 10:35)    ___________________________________________________________________________________  ===============>>  A S S E S S M E N T   A N D   P L A N <<===============  ------------------------------------------------------------------------------------------    · Assessment	  This is a 56 year old female from home, post menopausal female, with PMH of Anemia, Asthma, Bladder cancer (previously on chemo until 5/2022), R nephrostomy tube, hyperparathyroidism, bradycardia with PPM coming in UTI.     Problem/Plan - 1:  ·  Problem:  complicated UTI in pt with cancer and nephrostomy tube   UA positive  finish ABx as per ID    ** gradually worsening renal function / creatinine and chronic proteinuria , now also with hypernatremia      nephrology consulted to eval      monitor BMP     encouarged Po hydration     Problem/Plan - 2:  ·  Problem: Asthma.   ·  Plan: - Pt has a history of Asthma.   - Will continue pts home medication Symbicort.    Problem/Plan - 3:  ·  Problem: Bladder cancer.   ·  Plan: - Pt has a history of bladder cancer with mets to the liver.   - follows  Dr. Renay Trinidad,  - pt has a PET scan scheduled later this month to monitor progress..     Problem/Plan - 4:  ·  Problem: HLD (hyperlipidemia).   ·  Plan: - Pt has a history of HLD.   - Will continue pts home medication Atorvastatin.    Problem/Plan - 5:  ·  Problem: HTN (hypertension).   ·  Plan: - Pt has a history of HTN.   - Currently not on any medication.   - Continue to monitor.    Problem/Plan - 6:  ·  Problem: Prophylactic measure.   ·  Plan: - Heparin - DVT.  encouraged PO intake     --------------------------------------------  Case discussed with pt, NP..   Education given on findings and plan of care  ___________________________  H. NIEVES Ochoa.  Pager: [phone]

## 2022-08-19 NOTE — PROGRESS NOTE ADULT - PROBLEM SELECTOR PLAN 2
p/w Scr 1.41  s/p IVF blous  Scr 1.40  renal US- b/l hydronephrosis  avoid nephrotoxins  f/u BMP in AM  Nephro consulted- Dr. Murcia

## 2022-08-19 NOTE — PROGRESS NOTE ADULT - SUBJECTIVE AND OBJECTIVE BOX
Patient is seen and examined at the bed side, is afebrile. The Nephrostomy tube exchange is rescheduled for Monday, 22.  The Renal US shows Bilateral hydronephrosis, mild on the right and moderate to severe on the left.       REVIEW OF SYSTEMS: All other review systems are negative      ALLERGIES: No Known Allergies      Vital Signs Last 24 Hrs  T(C): 36.6 (19 Aug 2022 12:48), Max: 36.6 (19 Aug 2022 12:48)  T(F): 97.8 (19 Aug 2022 12:48), Max: 97.8 (19 Aug 2022 12:48)  HR: 60 (19 Aug 2022 12:48) (60 - 65)  BP: 102/56 (19 Aug 2022 12:48) (102/56 - 118/56)  BP(mean): --  RR: 18 (19 Aug 2022 12:48) (18 - 20)  SpO2: 100% (19 Aug 2022 12:48) (100% - 100%)    Parameters below as of 19 Aug 2022 12:48  Patient On (Oxygen Delivery Method): room air      PHYSICAL EXAM:  GENERAL: Not in distress   CHEST/LUNG:  Not using accessory muscles   HEART: s1 and s2 present  ABDOMEN:  Nontender and  Nondistended  : Right nephrostomy tubae in placed   EXTREMITIES: No pedal  edema  CNS: Awake and Alert      LABS:                        9.0    7.38  )-----------( 370      ( 19 Aug 2022 07:03 )             29.2                           8.4    7.25  )-----------( 311      ( 18 Aug 2022 06:56 )             27.3           161<HH>  |  126<H>  |  36<H>  ----------------------------<  84  4.6   |  21<L>  |  1.40<H>    Ca    10.7<H>      19 Aug 2022 07:03  Phos  3.5     08-19  Mg     2.5           08-18    140  |  109<H>  |  33<H>  ----------------------------<  82  4.5   |  21<L>  |  1.38<H>    Ca    10.0      18 Aug 2022 06:56    TPro  8.1  /  Alb  2.6<L>  /  TBili  0.2  /  DBili  x   /  AST  14  /  ALT  14  /  AlkPhos  93  -15        Urinalysis Basic - ( 16 Aug 2022 21:40 )  Color: Yellow / Appearance: Slightly Turbid / S.015 / pH: x  Gluc: x / Ketone: Negative  / Bili: Negative / Urobili: Negative   Blood: x / Protein: 500 mg/dL / Nitrite: Negative   Leuk Esterase: Moderate / RBC: 10-25 /HPF / WBC 26-50 /HPF   Sq Epi: x / Non Sq Epi: Few /HPF / Bacteria: Moderate /HPF        MEDICATIONS  (STANDING):    atorvastatin 40 milliGRAM(s) Oral at bedtime  budesonide  80 MICROgram(s)/formoterol 4.5 MICROgram(s) Inhaler 2 Puff(s) Inhalation two times a day  cefepime   IVPB 1000 milliGRAM(s) IV Intermittent every 12 hours  dextrose 5%. 1000 milliLiter(s) (70 mL/Hr) IV Continuous <Continuous>  heparin   Injectable 5000 Unit(s) SubCutaneous every 8 hours  OLANZapine 5 milliGRAM(s) Oral at bedtime  ondansetron Injectable 4 milliGRAM(s) IV Push once  pantoprazole    Tablet 40 milliGRAM(s) Oral before breakfast      RADIOLOGY & ADDITIONAL TESTS:    22: US Renal (22 @ 19:21) >Bilateral hydronephrosis, mild on the right and moderate to severe on the   left.  A right nephrostomy tube is present.  4 mm nonobstructive calculus in the right kidney.    2. Complex cysts with the solid components or debris in the left kidney. If clinically indicated, abdominal MR without and with IV contrast may be   pursued for further evaluation.    22: Xray Chest 1 View- PORTABLE-Urgent (22 @ 01:40) Redemonstration of bilateral metastatic lung nodules.      MICROBIOLOGY DATA:  Culture - Urine (08.15.22 @ 19:55)   Specimen Source: Clean Catch Clean Catch (Midstream)   Culture Results:   <10,000 CFU/mL Normal Urogenital Vandana     COVID-19 PCR (08.15.22 @ 19:55)   COVID-19 PCR: NotDetec:     Culture - Blood (08.15.22 @ 19:50)   Specimen Source: .Blood Blood-Peripheral   Culture Results: No growth to date.     Culture - Blood (08.15.22 @ 19:45)   Specimen Source: .Blood Blood-Peripheral   Culture Results: No growth to date.

## 2022-08-19 NOTE — PROGRESS NOTE ADULT - ASSESSMENT
Patient is a 56y old  Female from home, post menopausal female, with PMH of Anemia, Asthma, Bladder cancer (previously on chemo until 5/2022) with  Dr. Renay Trinidad, R nephrostomy tube, hyperparathyroidism, bradycardia with PPM, now presents to the ER for evaluation of fever and chills at home.  She states that her nephrostomy tubes tends to get clogged often and she has been flushing it constantly.  Also she has been feeling lethargic, having Urinary frequency and abdominal pain. On admission, she has no fever, no Leukocytosis but cloudy urine and positive Urine analysis. She has started on Cefepime and the ID consult requested to assist with further evaluation and antibiotic management.    # Complicated UTI- in the setting of Right nephrostomy tube- Urine cx has no growth  # Bilateral hydronephrosis, mild on the right and moderate to severe on the left on Renal Us from 8/18/22  # Bladder cancer     would recommend:    1. Urology evaluation for Severe Left hydronephrosis   2. Monitor kidney function and adjust Abx doses accordingly  3. Continue Cefepime based on previous C & S  4. Pain management as needed  5. Bowel regimen as needed    d/w patient , covering NP and Dr. Ochoa     Attending Attestation:    Spent more than 35 minutes on total encounter, more than 50 % of the visit was spent counseling and/or coordinating care by the Attending physician.

## 2022-08-19 NOTE — PROGRESS NOTE ADULT - SUBJECTIVE AND OBJECTIVE BOX
Patient is a 56y old  Female who presents with a chief complaint of UTI (18 Aug 2022 15:45)      INTERVAL HPI/OVERNIGHT EVENTS: no overnight events    I&O's Summary    18 Aug 2022 07:01  -  19 Aug 2022 07:00  --------------------------------------------------------  IN: 0 mL / OUT: 200 mL / NET: -200 mL      Vital Signs Last 24 Hrs  T(C): 36.4 (19 Aug 2022 05:02), Max: 36.6 (18 Aug 2022 12:32)  T(F): 97.6 (19 Aug 2022 05:02), Max: 97.8 (18 Aug 2022 12:32)  HR: 61 (19 Aug 2022 05:02) (61 - 69)  BP: 112/54 (19 Aug 2022 05:02) (106/50 - 118/56)  BP(mean): --  RR: 19 (19 Aug 2022 05:02) (18 - 20)  SpO2: 100% (19 Aug 2022 05:02) (100% - 100%)    Parameters below as of 19 Aug 2022 05:02  Patient On (Oxygen Delivery Method): room air      PAST MEDICAL & SURGICAL HISTORY:  Anemia      Asthma      HLD (hyperlipidemia)      Pacemaker  [employer]      Bladder cancer      HTN (hypertension)      Parathyroid tumor      Liver cyst      Hydronephrosis  has right Nephrostomy tube - dressing intact      Bradycardia      History of renal calculi      H/O myomectomy      Presence of cardiac pacemaker      H/O hydronephrosis  s/p Right Perc nephrostomy tube placement 02/2021, exchange 06/2021          SOCIAL HISTORY  Alcohol:  Tobacco:  Illicit substance use:      FAMILY HISTORY:      LABS:                        9.0    7.38  )-----------( 370      ( 19 Aug 2022 07:03 )             29.2     08-19    161<HH>  |  126<H>  |  36<H>  ----------------------------<  84  4.6   |  21<L>  |  1.40<H>    Ca    10.7<H>      19 Aug 2022 07:03  Phos  3.5     08-19  Mg     2.5     08-19      PT/INR - ( 19 Aug 2022 07:03 )   PT: 13.7 sec;   INR: 1.15 ratio          CAPILLARY BLOOD GLUCOSE        MEDICATIONS  (STANDING):  atorvastatin 40 milliGRAM(s) Oral at bedtime  budesonide  80 MICROgram(s)/formoterol 4.5 MICROgram(s) Inhaler 2 Puff(s) Inhalation two times a day  cefepime   IVPB 1000 milliGRAM(s) IV Intermittent every 12 hours  dextrose 5%. 1000 milliLiter(s) (70 mL/Hr) IV Continuous <Continuous>  heparin   Injectable 5000 Unit(s) SubCutaneous every 8 hours  OLANZapine 5 milliGRAM(s) Oral at bedtime  ondansetron Injectable 4 milliGRAM(s) IV Push once  pantoprazole    Tablet 40 milliGRAM(s) Oral before breakfast    MEDICATIONS  (PRN):  acetaminophen     Tablet .. 650 milliGRAM(s) Oral every 6 hours PRN Temp greater or equal to 38C (100.4F), Mild Pain (1 - 3)  aluminum hydroxide/magnesium hydroxide/simethicone Suspension 30 milliLiter(s) Oral every 4 hours PRN Dyspepsia  melatonin 3 milliGRAM(s) Oral at bedtime PRN Insomnia  polyethylene glycol 3350 17 Gram(s) Oral daily PRN Constipation      REVIEW OF SYSTEMS:  CONSTITUTIONAL: No fever  EYES: No eye pain  ENMT:  No difficulty hearing, vertigo; No sinus or throat pain  NECK: No pain or stiffness  RESPIRATORY: No cough, No shortness of breath  CARDIOVASCULAR: No chest pain, palpitations, dizziness, or leg swelling  GASTROINTESTINAL: + rt sided abdominal pain. No nausea, vomiting,  No diarrhea or constipation.   GENITOURINARY: No dysuria, + frequency, no hematuria, or incontinence  NEUROLOGICAL: No headaches  SKIN: No rashes, or lesions   MUSCULOSKELETAL: No joint pain or swelling    RADIOLOGY & ADDITIONAL TESTS:< from: Xray Chest 1 View- PORTABLE-Urgent (08.16.22 @ 01:40) >    ACC: [id number] EXAM:  XR CHEST PORTABLE URGENT 1V                          PROCEDURE DATE:  08/16/2022          INTERPRETATION:  INDICATION: Back pain; assess for chest process.    COMPARISON: 7/18/22 plain chest. 7/31/22 CT scan of chest.    Technique: AP radiograph of the chest    FINDINGS:  Left-sided pacemaker in place.  Heart/Vascular:  Cardiomediastinal silhouette is within normal limits.  Pulmonary: Again noted are bilateral lung nodules. A representative   nodule in the lower left lobe measures 3.0xm x 1.9cm. No focal   infiltrate.. No pleural effusion or pneumothorax.  Bones: No acute bony finding.    Impression:  Redemonstration of bilateral metastatic lung nodules.        --- End of Report ---      RUPA OLIVAREZ MD; Attending Radiologist  This document has been electronically signed. Aug 18 2022  9:13AM    < end of copied text >  < from: US Renal (08.18.22 @ 19:21) >    ACC: [id number] EXAM:  US KIDNEY(S)                          PROCEDURE DATE:  08/18/2022          INTERPRETATION:  CLINICAL INFORMATION: Acute kidney injury    COMPARISON: No prior renal ultrasounds available for comparison.    TECHNIQUE: Sonography of the kidneys and bladder.    FINDINGS:  Right kidney: 10.1 cm. Mild right hydronephrosis. 4 mm nonobstructive   calculus in the lower pole of the right kidney. No right renal mass is   identified. A right nephrostomy tube is present.    Left kidney:12.7 cm. 2 lower pole complex left renal cysts measuring 3.5   x 4.0 x 4.3 cm and 3.7 x 3.9 x 2.7 cm with solid components or debris.   Moderate to severe hydronephrosis. No evidence for a calculus in the left   kidney.    Urinary bladder: Collapsed    IMPRESSION:  Bilateral hydronephrosis, mild on the right and moderate to severe on the   left.    A right nephrostomy tube is present.    4 mm nonobstructive calculus in the right kidney.    2. Complex cysts with the solid components or debris in the left kidney.   If clinically indicated, abdominal MR without and with IV contrast may be   pursued for further evaluation.    --- End of Report ---      MORRIS HIGGINS MD; Attending Radiologist  This document has been electronically signed. Aug 18 2022  8:48PM    < end of copied text >      Imaging Personally Reviewed:  [x ] YES  [ ] NO    Consultant(s) Notes Reviewed:  [x ] YES  [ ] NO    PHYSICAL EXAM:  GENERAL: NAD  HEAD:  Atraumatic, Normocephalic  EYES: conjunctiva and sclera clear  ENMT: Moist mucous membranes  NECK: Supple  NERVOUS SYSTEM:  Alert & Oriented X3, Good concentration  CHEST/LUNG: CTA bilaterally; No rales, rhonchi, wheezing  HEART: Regular rate and rhythm  ABDOMEN: Soft, Nontender, Nondistended; Bowel sounds present  EXTREMITIES:  2+ Peripheral Pulses, No clubbing, cyanosis, or edema  SKIN: No rashes or lesions    Care Collaborated Discussed with Consultants/Other Providers [ x] YES  [ ] NO

## 2022-08-20 NOTE — PROGRESS NOTE ADULT - ASSESSMENT
_________________________________________________________________________________________  ========>>  M E D I C A L   A T T E N D I N G    F O L L O W  U P  N O T E  <<=========  -----------------------------------------------------------------------------------------------------    - Patient seen and examined by me approximately sixty minutes ago.  - In summary,  JEF HOUSE is a 56y year old woman admitted with pain in back., possible UTI   - Patient today overall doing fairly .. somewhat uncomfortable, eating poorly due to pain in Rt flank     ==================>> REVIEW OF SYSTEM <<=================    GEN: no fever, no chills, as above , +  pain in Rt flank and back   RESP: no SOB, no cough, no sputum  CVS: no chest pain, no palpitations, no edema  GI: no abdominal pain, no nausea, no constipation, no diarrhea  : no dysuria, no frequency, no hematuria, nephrostomy draining , pt flushes many times during the day for comfort   Neuro: no headache, no dizziness  Derm : no itching, no rash    ==================>> PHYSICAL EXAM <<=================    GEN: A&O X 3 , NAD , uncomfortable, calm but tearful at times cachectic   HEENT: NCAT, PERRL, MMM, hearing intact, temporal wasting   Neck: supple , no JVD appreciated  CVS: S1S2 , regular , No M/R/G appreciated  PULM: CTA B/L,  no W/R/R appreciated  ABD.: soft. non tender, non distended,  bowel sounds present  Extrem: intact pulses , no edema   PSYCH : normal mood,  not anxious, as above                                ( Note written / Date of service 08-20-22 )    ==================>> MEDICATIONS <<====================    atorvastatin 40 milliGRAM(s) Oral at bedtime  budesonide  80 MICROgram(s)/formoterol 4.5 MICROgram(s) Inhaler 2 Puff(s) Inhalation two times a day  cefepime   IVPB 1000 milliGRAM(s) IV Intermittent every 12 hours  dextrose 5%. 1000 milliLiter(s) IV Continuous <Continuous>  heparin   Injectable 5000 Unit(s) SubCutaneous every 8 hours  OLANZapine 5 milliGRAM(s) Oral at bedtime  ondansetron Injectable 4 milliGRAM(s) IV Push once  pantoprazole    Tablet 40 milliGRAM(s) Oral before breakfast    MEDICATIONS  (PRN):  acetaminophen     Tablet .. 650 milliGRAM(s) Oral every 6 hours PRN Temp greater or equal to 38C (100.4F), Mild Pain (1 - 3)  aluminum hydroxide/magnesium hydroxide/simethicone Suspension 30 milliLiter(s) Oral every 4 hours PRN Dyspepsia  melatonin 3 milliGRAM(s) Oral at bedtime PRN Insomnia  polyethylene glycol 3350 17 Gram(s) Oral daily PRN Constipation    ___________  Active diet:  Diet, Regular  ___________________    ==================>> VITAL SIGNS <<==================    Vital Signs Last 24 HrsT(C): 36.3 (08-20-22 @ 12:23)  T(F): 97.3 (08-20-22 @ 12:23), Max: 97.5 (08-19-22 @ 20:50)  HR: 62 (08-20-22 @ 12:23) (62 - 64)  BP: 109/56 (08-20-22 @ 12:23)  RR: 18 (08-20-22 @ 12:23) (18 - 20)  SpO2: 97% (08-20-22 @ 12:23) (97% - 100%)      CAPILLARY BLOOD GLUCOSE  POCT Blood Glucose.: 80 mg/dL (20 Aug 2022 16:30)  POCT Blood Glucose.: 81 mg/dL (20 Aug 2022 11:29)  POCT Blood Glucose.: 85 mg/dL (20 Aug 2022 07:56)  POCT Blood Glucose.: 85 mg/dL (19 Aug 2022 22:45)     ==================>> LAB AND IMAGING <<==================                        9.1    7.50  )-----------( 390      ( 20 Aug 2022 06:02 )             30.2        08-20    137  |  108  |  37<H>  ----------------------------<  84  4.2   |  20<L>  |  1.33<H>    Ca    10.2      20 Aug 2022 06:02  Phos  3.1     08-20  Mg     2.5     08-20    TPro  x   /  Alb  2.6<L>  /  TBili  x   /  DBili  x   /  AST  x   /  ALT  x   /  AlkPhos  x   08-20    WBC count:   7.50 <<== ,  7.38 <<== ,  7.25 <<== ,  7.21 <<== ,  8.10 <<== ,  10.54 <<==   Hemoglobin:   9.1 <<==,  9.0 <<==,  8.4 <<==,  9.0 <<==,  9.4 <<==,  9.6 <<==  platelets:  390 <==, 370 <==, 311 <==, 328 <==, 271 <==, 321 <==    Creatinine:  1.33  <<==, 1.49  <<==, 1.40  <<==, 1.38  <<==, 1.36  <<==, 1.38  <<==  Sodium:   137  <==, 140  <==, 161  <==, 140  <==, 141  <==, 140  <==, 137  <==      ___________________________    M I C R O B I O L O G Y :    Culture - Urine (collected 16 Aug 2022 21:40)  Source: .Urine Nephrostomy - Right  Final Report (18 Aug 2022 09:15):    No growth    Culture - Urine (collected 15 Aug 2022 19:55)  Source: Clean Catch Clean Catch (Midstream)  Final Report (16 Aug 2022 18:02):    <10,000 CFU/mL Normal Urogenital Vandana    Culture - Blood (collected 15 Aug 2022 19:50)  Source: .Blood Blood-Peripheral  Preliminary Report (16 Aug 2022 22:01):    No growth to date.    Culture - Blood (collected 15 Aug 2022 19:45)  Source: .Blood Blood-Peripheral  Preliminary Report (16 Aug 2022 22:01):    No growth to date.    COVID-19 PCR: NotDetelili (08-15-22 @ 19:55)    ___________________________________________________________________________________  ===============>>  A S S E S S M E N T   A N D   P L A N <<===============  ------------------------------------------------------------------------------------------    · Assessment	  This is a 56 year old female from home, post menopausal female, with PMH of Anemia, Asthma, Bladder cancer (previously on chemo until 5/2022), R nephrostomy tube, hyperparathyroidism, bradycardia with PPM coming in UTI.     Problem/Plan - 1:  ·  Problem:  complicated UTI in pt with cancer and nephrostomy tube   UA positive  finish ABx as per ID    ** gradually worsening renal function / creatinine and chronic proteinuria , now also with hypernatremia      nephrology following, appreciated      monitor BMP     encouraged Po hydration      prteinurea : unclear etiology >> discussed with michelle thompson for now    Problem/Plan - 2:  ·  Problem: Asthma.   ·  Plan: - Pt has a history of Asthma.   - Will continue pts home medication Symbicort.    Problem/Plan - 3:  ·  Problem: Bladder cancer.   ·  Plan: - Pt has a history of bladder cancer with mets to the liver.   - follows  Dr. Renay Trinidad,  - pt has a PET scan scheduled later this month to monitor progress..     Problem/Plan - 4:  ·  Problem: HLD (hyperlipidemia).   ·  Plan: - Pt has a history of HLD.   - Will continue pts home medication Atorvastatin.    Problem/Plan - 5:  ·  Problem: HTN (hypertension).   ·  Plan: - Pt has a history of HTN.   - Currently not on any medication.   - Continue to monitor.    Problem/Plan - 6:  ·  Problem: Prophylactic measure.   ·  Plan: - Heparin - DVT.  encouraged PO intake     --------------------------------------------  Case discussed with pt, nephrologist at bedside ion given on findings and plan of care  ___________________________  H. NIEVES Ochoa.  Pager: [phone]

## 2022-08-20 NOTE — PROGRESS NOTE ADULT - ASSESSMENT
Patient is a 56y old  Female from home, post menopausal female, with PMH of Anemia, Asthma, Bladder cancer (previously on chemo until 5/2022) with  Dr. Renay Trinidad, R nephrostomy tube, hyperparathyroidism, bradycardia with PPM, now presents to the ER for evaluation of fever and chills at home.  She states that her nephrostomy tubes tends to get clogged often and she has been flushing it constantly.  Also she has been feeling lethargic, having Urinary frequency and abdominal pain. On admission, she has no fever, no Leukocytosis but cloudy urine and positive Urine analysis. She has started on Cefepime and the ID consult requested to assist with further evaluation and antibiotic management.    # Complicated UTI- in the setting of Right nephrostomy tube- Urine cx has no growth-  The right nephrostomy tube is not draining well, patient has to drain manually. She is scheduled for Right NT on Monday, 8/22/22, by IR.  # Bilateral hydronephrosis, mild on the right and moderate to severe on the left on Renal Us from 8/18/22  # Bladder cancer     would recommend:    1. Urology evaluation for Severe Left hydronephrosis   2. Monitor kidney function and adjust Abx doses accordingly  3. Continue Cefepime based on previous C & S  4. Pain management as needed  5. Bowel regimen as needed    d/w patient     Attending Attestation:    Spent more than 35 minutes on total encounter, more than 50 % of the visit was spent counseling and/or coordinating care by the Attending physician.

## 2022-08-20 NOTE — PROGRESS NOTE ADULT - SUBJECTIVE AND OBJECTIVE BOX
[facility] NEPHROLOGY- PROGRESS NOTE    Patient is a 56y Female with hx bladder cancer (on chemo until 2022) with  Dr. Renay Trinidad, R nephrostomy tube, kidney stones, parathyroid tumor, hyperparathyroidism, bradycardia with PPM who presented to the hospital with fever and was found to have a UTI.  She was also found to have R nephrostomy tube malpositioned and has a planned exchange on .  This morning, pt was found to have sodium elevated at 160 and creatinine elevate to 1.4 (baseline creatinine ~1).    Pt c/o R flank pain radiating across abdomen.        REVIEW OF SYSTEMS: no H/A, CP, SOB.    VITALS:  T(F): 97.3 (22 @ 12:23), Max: 97.5 (22 @ 20:50)  HR: 62 (22 @ 12:23)  BP: 109/56 (22 @ 12:23)  RR: 18 (22 @ 12:23)  SpO2: 97% (22 @ 12:23)  Wt(kg): --     @ 07:01  -   @ 07:00  --------------------------------------------------------  IN: 480 mL / OUT: 150 mL / NET: 330 mL        PHYSICAL EXAM:  Constitutional: NAD, somewhat cachectic  HEENT: anicteric sclera, oropharynx clear, MMM, muscle wasting at temples  Neck: No JVD  Respiratory: CTAB, no wheezes, rales or rhonchi  Cardiovascular: S1, S2, RRR  Gastrointestinal: BS+, soft, NT/ND  Extremities: No cyanosis or clubbing. No peripheral edema  Neurological: A/O x 3, no focal deficits  Psychiatric: Normal mood, normal affect  : No CVA tenderness. No toth. + R nephrostomy tube without urine.  Skin: No rashes      LABS:      137  |  108  |  37<H>  ----------------------------<  84  4.2   |  20<L>  |  1.33<H>    Ca    10.2      20 Aug 2022 06:02  Phos  3.1     08-20  Mg     2.5     08-20    TPro      /  Alb  2.6<L>  /  TBili      /  DBili      /  AST      /  ALT      /  AlkPhos      08-20    Creatinine Trend: 1.33 <--, 1.49 <--, 1.40 <--, 1.38 <--, 1.36 <--, 1.38 <--, 1.41 <--                        9.1    7.50  )-----------( 390      ( 20 Aug 2022 06:02 )             30.2     Urine Studies:  Urinalysis Basic - ( 16 Aug 2022 21:40 )    Color: Yellow / Appearance: Slightly Turbid / S.015 / pH:   Gluc:  / Ketone: Negative  / Bili: Negative / Urobili: Negative   Blood:  / Protein: 500 mg/dL / Nitrite: Negative   Leuk Esterase: Moderate / RBC: 10-25 /HPF / WBC 26-50 /HPF   Sq Epi:  / Non Sq Epi: Few /HPF / Bacteria: Moderate /HPF

## 2022-08-20 NOTE — PROGRESS NOTE ADULT - ASSESSMENT
1. CRYSTAL- mild CRYSTAL in the setting of infection and obstruction.  Renal fxn stable. Baseline creatinine ~ 1.  2. B hydronephrosis- Planned R nephrostomy tube exchange. Urology f/u thereafter.  3. Kidney Cysts- complex.  Also require urology f/u.  4. Kidney stones- non-obstructive.  Would also f/u with urology.  I can also do urine chemistry analysis as an outpatient if urology would prefer.  5. History of parathyorid tumor with hyperparathyroidism- calcium is borderline elevated.  However, serum calcium has been rising and a new albumin is necessary to correct for hypoalbuminemia.  check serum albumin, ionized calcium, phosphorus, and iPTH.  Pt may need endocrinology f/u, particularly given that there are non-obstructive kidney stones.  6. Hypernatremia- Lab error.  Na+ remains WNL.         [facility] NEPHROLOGY  Caden Nicolas M.D.  Abner Schofield D.O.  Sherita Murcia M.D.  Laverne Calabrese, LEONOR, ANP-C    Telephone: [phone]  Facsimile: [phone]    [address] [place] [address]

## 2022-08-20 NOTE — PROGRESS NOTE ADULT - SUBJECTIVE AND OBJECTIVE BOX
Patient is seen and examined at the bed side, is afebrile. The sodium level came down to normal. Also kidney function is improving. The right nephrostomy tube is not draining well, patient has to drain manually. She is scheduled for Right NT on Monday by IR.      REVIEW OF SYSTEMS: All other review systems are negative      ALLERGIES: No Known Allergies      Vital Signs Last 24 Hrs  T(C): 36.3 (20 Aug 2022 12:23), Max: 36.4 (19 Aug 2022 20:50)  T(F): 97.3 (20 Aug 2022 12:23), Max: 97.5 (19 Aug 2022 20:50)  HR: 62 (20 Aug 2022 12:23) (62 - 64)  BP: 109/56 (20 Aug 2022 12:23) (100/57 - 109/56)  BP(mean): --  RR: 18 (20 Aug 2022 12:23) (18 - 20)  SpO2: 97% (20 Aug 2022 12:23) (97% - 100%)    Parameters below as of 20 Aug 2022 12:23  Patient On (Oxygen Delivery Method): room air        PHYSICAL EXAM:  GENERAL: Not in distress   CHEST/LUNG:  Not using accessory muscles   HEART: s1 and s2 present  ABDOMEN:  Nontender and  Nondistended  : Right nephrostomy tubae in placed   EXTREMITIES: No pedal  edema  CNS: Awake and Alert      LABS:                        9.1    7.50  )-----------( 390      ( 20 Aug 2022 06:02 )             30.2                           9.0    7.38  )-----------( 370      ( 19 Aug 2022 07:03 )             29.2            08    137  |  108  |  37<H>  ----------------------------<  84  4.2   |  20<L>  |  1.33<H>    Ca    10.2      20 Aug 2022 06:02  Phos  3.1     08-  Mg     2.5     08-20    TPro  x   /  Alb  2.6<L>  /  TBili  x   /  DBili  x   /  AST  x   /  ALT  x   /  AlkPhos  x   08-    08-    161<HH>  |  126<H>  |  36<H>  ----------------------------<  84  4.6   |  21<L>  |  1.40<H>    Ca    10.7<H>      19 Aug 2022 07:03  Phos  3.5       Mg     2.5         TPro  8.1  /  Alb  2.6<L>  /  TBili  0.2  /  DBili  x   /  AST  14  /  ALT  14  /  AlkPhos  93  08-15        Urinalysis Basic - ( 16 Aug 2022 21:40 )  Color: Yellow / Appearance: Slightly Turbid / S.015 / pH: x  Gluc: x / Ketone: Negative  / Bili: Negative / Urobili: Negative   Blood: x / Protein: 500 mg/dL / Nitrite: Negative   Leuk Esterase: Moderate / RBC: 10-25 /HPF / WBC 26-50 /HPF   Sq Epi: x / Non Sq Epi: Few /HPF / Bacteria: Moderate /HPF        MEDICATIONS  (STANDING):    atorvastatin 40 milliGRAM(s) Oral at bedtime  budesonide  80 MICROgram(s)/formoterol 4.5 MICROgram(s) Inhaler 2 Puff(s) Inhalation two times a day  cefepime   IVPB 1000 milliGRAM(s) IV Intermittent every 12 hours  dextrose 5%. 1000 milliLiter(s) (70 mL/Hr) IV Continuous <Continuous>  heparin   Injectable 5000 Unit(s) SubCutaneous every 8 hours  OLANZapine 5 milliGRAM(s) Oral at bedtime  ondansetron Injectable 4 milliGRAM(s) IV Push once  pantoprazole    Tablet 40 milliGRAM(s) Oral before breakfast      RADIOLOGY & ADDITIONAL TESTS:    22: US Renal (22 @ 19:21) >Bilateral hydronephrosis, mild on the right and moderate to severe on the   left.  A right nephrostomy tube is present.  4 mm nonobstructive calculus in the right kidney.    2. Complex cysts with the solid components or debris in the left kidney. If clinically indicated, abdominal MR without and with IV contrast may be   pursued for further evaluation.    22: Xray Chest 1 View- PORTABLE-Urgent (22 @ 01:40) Redemonstration of bilateral metastatic lung nodules.      MICROBIOLOGY DATA:  Culture - Urine (08.15.22 @ 19:55)   Specimen Source: Clean Catch Clean Catch (Midstream)   Culture Results: <10,000 CFU/mL Normal Urogenital Vandana     COVID-19 PCR (08.15.22 @ 19:55)   COVID-19 PCR: NotDetec:     Culture - Blood (08.15.22 @ 19:50)   Specimen Source: .Blood Blood-Peripheral   Culture Results: No growth to date.     Culture - Blood (08.15.22 @ 19:45)   Specimen Source: .Blood Blood-Peripheral   Culture Results: No growth to date.

## 2022-08-21 NOTE — PROGRESS NOTE ADULT - ASSESSMENT
Patient is a 56y old  Female from home, post menopausal female, with PMH of Anemia, Asthma, Bladder cancer (previously on chemo until 5/2022) with  Dr. Renay Trinidad, R nephrostomy tube, hyperparathyroidism, bradycardia with PPM, now presents to the ER for evaluation of fever and chills at home.  She states that her nephrostomy tubes tends to get clogged often and she has been flushing it constantly.  Also she has been feeling lethargic, having Urinary frequency and abdominal pain. On admission, she has no fever, no Leukocytosis but cloudy urine and positive Urine analysis. She has started on Cefepime and the ID consult requested to assist with further evaluation and antibiotic management.    # Complicated UTI- in the setting of Right nephrostomy tube- Urine cx has no growth-  The right nephrostomy tube is not draining well, patient has to drain manually. She is scheduled for Right NT on Monday, 8/22/22, by IR.  # Bilateral hydronephrosis, mild on the right and moderate to severe on the left on Renal Us from 8/18/22  # Bladder cancer     would recommend:    1. OOB to chair   2. Monitor kidney function and adjust Abx doses accordingly  3. Continue Cefepime based on previous C & S  4. Pain management as needed  5. Bowel regimen as needed    d/w patient     Attending Attestation:    Spent more than 35 minutes on total encounter, more than 50 % of the visit was spent counseling and/or coordinating care by the Attending physician.   Patient is a 56y old  Female from home, post menopausal female, with PMH of Anemia, Asthma, Bladder cancer (previously on chemo until 5/2022) with  Dr. Renay Trinidad, R nephrostomy tube, hyperparathyroidism, bradycardia with PPM, now presents to the ER for evaluation of fever and chills at home.  She states that her nephrostomy tubes tends to get clogged often and she has been flushing it constantly.  Also she has been feeling lethargic, having Urinary frequency and abdominal pain. On admission, she has no fever, no Leukocytosis but cloudy urine and positive Urine analysis. She has started on Cefepime and the ID consult requested to assist with further evaluation and antibiotic management.    # Complicated UTI- in the setting of Right nephrostomy tube- Urine cx has no growth-  The right nephrostomy tube is not draining well, patient has to drain manually. She is scheduled for Right NT on Monday, 8/22/22, by IR.  # Bilateral hydronephrosis, mild on the right and moderate to severe on the left on Renal Us from 8/18/22  # Bladder cancer     would recommend:    1. NPO after midnight for IR procedure to change Right nephrostomy tube  2. Monitor kidney function and adjust Abx doses accordingly  3. Continue Cefepime based on previous C & S  4. Pain management as needed  5. Bowel regimen as needed    d/w patient     Attending Attestation:    Spent more than 35 minutes on total encounter, more than 50 % of the visit was spent counseling and/or coordinating care by the Attending physician.

## 2022-08-21 NOTE — PROGRESS NOTE ADULT - SUBJECTIVE AND OBJECTIVE BOX
Patient is seen and examined at the bed side, is afebrile. She is still  draining the urine manually since Right NT is not working.  She is scheduled for Right NT on Monday by IR.      REVIEW OF SYSTEMS: All other review systems are negative      ALLERGIES: No Known Allergies      Vital Signs Last 24 Hrs  T(C): 36.2 (21 Aug 2022 04:45), Max: 36.4 (20 Aug 2022 21:00)  T(F): 97.2 (21 Aug 2022 04:45), Max: 97.5 (20 Aug 2022 21:00)  HR: 62 (21 Aug 2022 04:45) (61 - 62)  BP: 107/55 (21 Aug 2022 04:45) (92/64 - 107/55)  BP(mean): --  RR: 16 (21 Aug 2022 04:45) (16 - 18)  SpO2: 100% (21 Aug 2022 04:45) (100% - 100%)    Parameters below as of 21 Aug 2022 04:45  Patient On (Oxygen Delivery Method): room air        PHYSICAL EXAM:  GENERAL: Not in distress   CHEST/LUNG:  Not using accessory muscles   HEART: s1 and s2 present  ABDOMEN:  Nontender and  Nondistended  : Right nephrostomy tubae in placed   EXTREMITIES: No pedal  edema  CNS: Awake and Alert      LABS:                        9.4    8.07  )-----------( 383      ( 21 Aug 2022 06:37 )             30.8                           9.1    7.50  )-----------( 390      ( 20 Aug 2022 06:02 )             30.2       08-    136  |  106  |  41<H>  ----------------------------<  81  4.0   |  21<L>  |  1.40<H>    Ca    10.0      21 Aug 2022 06:37  Phos  3.1     08-20  Mg     2.6     08-    TPro  x   /  Alb  2.6<L>  /  TBili  x   /  DBili  x   /  AST  x   /  ALT  x   /  AlkPhos  x   08-20    08-20    137  |  108  |  37<H>  ----------------------------<  84  4.2   |  20<L>  |  1.33<H>    Ca    10.2      20 Aug 2022 06:02  Phos  3.1     08-20  Mg     2.5     08-20    TPro  x   /  Alb  2.6<L>  /  TBili  x   /  DBili  x   /  AST  x   /  ALT  x   /  AlkPhos  x   -        Urinalysis Basic - ( 16 Aug 2022 21:40 )  Color: Yellow / Appearance: Slightly Turbid / S.015 / pH: x  Gluc: x / Ketone: Negative  / Bili: Negative / Urobili: Negative   Blood: x / Protein: 500 mg/dL / Nitrite: Negative   Leuk Esterase: Moderate / RBC: 10-25 /HPF / WBC 26-50 /HPF   Sq Epi: x / Non Sq Epi: Few /HPF / Bacteria: Moderate /HPF        MEDICATIONS  (STANDING):    atorvastatin 40 milliGRAM(s) Oral at bedtime  budesonide  80 MICROgram(s)/formoterol 4.5 MICROgram(s) Inhaler 2 Puff(s) Inhalation two times a day  cefepime   IVPB 1000 milliGRAM(s) IV Intermittent every 12 hours  dextrose 5%. 1000 milliLiter(s) (70 mL/Hr) IV Continuous <Continuous>  heparin   Injectable 5000 Unit(s) SubCutaneous every 8 hours  OLANZapine 5 milliGRAM(s) Oral at bedtime  ondansetron Injectable 4 milliGRAM(s) IV Push once  pantoprazole    Tablet 40 milliGRAM(s) Oral before breakfast        RADIOLOGY & ADDITIONAL TESTS:    22: US Renal (22 @ 19:21) >Bilateral hydronephrosis, mild on the right and moderate to severe on the   left.  A right nephrostomy tube is present.  4 mm nonobstructive calculus in the right kidney.    2. Complex cysts with the solid components or debris in the left kidney. If clinically indicated, abdominal MR without and with IV contrast may be   pursued for further evaluation.    22: Xray Chest 1 View- PORTABLE-Urgent (22 @ 01:40) Redemonstration of bilateral metastatic lung nodules.      MICROBIOLOGY DATA:  Culture - Urine (08.15.22 @ 19:55)   Specimen Source: Clean Catch Clean Catch (Midstream)   Culture Results: <10,000 CFU/mL Normal Urogenital Vandana     COVID-19 PCR (08.15.22 @ 19:55)   COVID-19 PCR: NotDetec:     Culture - Blood (08.15.22 @ 19:50)   Specimen Source: .Blood Blood-Peripheral   Culture Results: No growth to date.     Culture - Blood (08.15.22 @ 19:45)   Specimen Source: .Blood Blood-Peripheral   Culture Results: No growth to date.

## 2022-08-21 NOTE — PROGRESS NOTE ADULT - ASSESSMENT
_________________________________________________________________________________________  ========>>  M E D I C A L   A T T E N D I N G    F O L L O W  U P  N O T E  <<=========  -----------------------------------------------------------------------------------------------------    - Patient seen and examined by me approximately sixty minutes ago.  - In summary,  JEF HOUSE is a 56y year old woman admitted with pain in back., possible UTI   - Patient today overall doing fairly .. somewhat uncomfortable, eating poorly due to pain in Rt flank     ==================>> REVIEW OF SYSTEM <<=================    GEN: no fever, no chills, as above , +  pain in Rt flank and back on and off   RESP: no SOB, no cough, no sputum  CVS: no chest pain, no palpitations, no edema  GI: no abdominal pain, no nausea, no constipation, no diarrhea  : no dysuria, no frequency, no hematuria, nephrostomy draining , pt flushes many times during the day for comfort   Neuro: no headache, no dizziness  Derm : no itching, no rash    ==================>> PHYSICAL EXAM <<=================    GEN: A&O X 3 , NAD , uncomfortable, calm but tearful at times cachectic   HEENT: NCAT, PERRL, MMM, hearing intact, temporal wasting   Neck: supple , no JVD appreciated  CVS: S1S2 , regular , No M/R/G appreciated  PULM: CTA B/L,  no W/R/R appreciated  ABD.: soft. non tender, non distended,  bowel sounds present  Extrem: intact pulses , no edema   PSYCH : normal mood,  not anxious, as above                              ( Note written / Date of service 08-21-22 )    ==================>> MEDICATIONS <<====================    atorvastatin 40 milliGRAM(s) Oral at bedtime  budesonide  80 MICROgram(s)/formoterol 4.5 MICROgram(s) Inhaler 2 Puff(s) Inhalation two times a day  cefepime   IVPB 1000 milliGRAM(s) IV Intermittent every 12 hours  dextrose 5%. 1000 milliLiter(s) IV Continuous <Continuous>  heparin   Injectable 5000 Unit(s) SubCutaneous every 8 hours  OLANZapine 5 milliGRAM(s) Oral at bedtime  ondansetron Injectable 4 milliGRAM(s) IV Push once  pantoprazole    Tablet 40 milliGRAM(s) Oral before breakfast    MEDICATIONS  (PRN):  acetaminophen     Tablet .. 650 milliGRAM(s) Oral every 6 hours PRN Temp greater or equal to 38C (100.4F), Mild Pain (1 - 3)  aluminum hydroxide/magnesium hydroxide/simethicone Suspension 30 milliLiter(s) Oral every 4 hours PRN Dyspepsia  melatonin 3 milliGRAM(s) Oral at bedtime PRN Insomnia  polyethylene glycol 3350 17 Gram(s) Oral daily PRN Constipation    ___________  Active diet:  Diet, NPO after Midnight:      NPO Start Date: 21-Aug-2022,   NPO Start Time: 23:59  Diet, Regular  ___________________    ==================>> VITAL SIGNS <<==================    Vital Signs Last 24 HrsT(C): 36.2 (08-21-22 @ 04:45)  T(F): 97.2 (08-21-22 @ 04:45), Max: 97.5 (08-20-22 @ 21:00)  HR: 62 (08-21-22 @ 04:45) (61 - 62)  BP: 107/55 (08-21-22 @ 04:45)  RR: 16 (08-21-22 @ 04:45) (16 - 18)  SpO2: 100% (08-21-22 @ 04:45) (97% - 100%)      CAPILLARY BLOOD GLUCOSE  POCT Blood Glucose.: 112 mg/dL (21 Aug 2022 11:21)  POCT Blood Glucose.: 78 mg/dL (21 Aug 2022 08:03)  POCT Blood Glucose.: 130 mg/dL (21 Aug 2022 04:04)  POCT Blood Glucose.: 64 mg/dL (21 Aug 2022 02:53)  POCT Blood Glucose.: 93 mg/dL (20 Aug 2022 21:14)  POCT Blood Glucose.: 80 mg/dL (20 Aug 2022 16:30)     ==================>> LAB AND IMAGING <<==================                        9.4    8.07  )-----------( 383      ( 21 Aug 2022 06:37 )             30.8        08-21    136  |  106  |  41<H>  ----------------------------<  81  4.0   |  21<L>  |  1.40<H>    Ca    10.0      21 Aug 2022 06:37  Phos  3.1     08-20  Mg     2.6     08-21    TPro  x   /  Alb  2.6<L>  /  TBili  x   /  DBili  x   /  AST  x   /  ALT  x   /  AlkPhos  x   08-20    WBC count:   8.07 <<== ,  7.50 <<== ,  7.38 <<== ,  7.25 <<== ,  7.21 <<==   Hemoglobin:   9.4 <<==,  9.1 <<==,  9.0 <<==,  8.4 <<==,  9.0 <<==  platelets:  383 <==, 390 <==, 370 <==, 311 <==, 328 <==, 271 <==, 321 <==    Creatinine:  1.40  <<==, 1.33  <<==, 1.49  <<==, 1.40  <<==, 1.38  <<==, 1.36  <<==  Sodium:   136  <==, 137  <==, 140  <==, 161  <==, 140  <==, 141  <==, 140  <==    ____________________________    M I C R O B I O L O G Y :    Culture - Urine (collected 16 Aug 2022 21:40)  Source: .Urine Nephrostomy - Right  Final Report (18 Aug 2022 09:15):    No growth    Culture - Urine (collected 15 Aug 2022 19:55)  Source: Clean Catch Clean Catch (Midstream)  Final Report (16 Aug 2022 18:02):    <10,000 CFU/mL Normal Urogenital Vandana    Culture - Blood (collected 15 Aug 2022 19:50)  Source: .Blood Blood-Peripheral  Final Report (20 Aug 2022 22:00):    No Growth Final    Culture - Blood (collected 15 Aug 2022 19:45)  Source: .Blood Blood-Peripheral  Final Report (20 Aug 2022 22:00):    No Growth Final    ___________________________________________________________________________________  ===============>>  A S S E S S M E N T   A N D   P L A N <<===============  ------------------------------------------------------------------------------------------    · Assessment	  This is a 56 year old female from home, post menopausal female, with PMH of Anemia, Asthma, Bladder cancer (previously on chemo until 5/2022), R nephrostomy tube, hyperparathyroidism, bradycardia with PPM coming in UTI.     Problem/Plan - 1:  ·  Problem:  complicated UTI in pt with cancer and nephrostomy tube   UA positive  finish ABx as per ID  nephrostomy change is reportedly scheduled for tomorrow with anesthesia  NPO post MN     ** gradually worsening renal function / creatinine and chronic proteinuria , now also with hypernatremia        CRYSTAL on CKD II      nephrology following, appreciated      monitor BMP     encouraged Po hydration      prteinurea : unclear etiology >> monitor for now    Problem/Plan - 2:  ·  Problem: Asthma.   ·  Plan: - Pt has a history of Asthma.   - Will continue pts home medication Symbicort.    Problem/Plan - 3:  ·  Problem: Bladder cancer.   ·  Plan: - Pt has a history of bladder cancer with mets to the liver.   - follows  Dr. Renay Trinidad,  - pt has a PET scan scheduled later this month to monitor progress..     Problem/Plan - 4:  ·  Problem: HLD (hyperlipidemia).   ·  Plan: - Pt has a history of HLD.   - Will continue pts home medication Atorvastatin.    Problem/Plan - 5:  ·  Problem: HTN (hypertension).   ·  Plan: - Pt has a history of HTN.   - Currently not on any medication.   - Continue to monitor.    Problem/Plan - 6:  ·  Problem: Prophylactic measure.   ·  Plan: - Heparin - DVT.  encouraged PO intake     --------------------------------------------  Case discussed with ptBRENDEN..NP  ___________________________  HLeslee Ochoa D.O.  Pager: [phone]

## 2022-08-21 NOTE — PROGRESS NOTE ADULT - SUBJECTIVE AND OBJECTIVE BOX
Full note to follow. [facility] NEPHROLOGY- PROGRESS NOTE    Patient is a 56y Female with hx bladder cancer (on chemo until 2022) with  Dr. Renay Trinidad, R nephrostomy tube, kidney stones, parathyroid tumor, hyperparathyroidism, bradycardia with PPM who presented to the hospital with fever and was found to have a UTI.  She was also found to have R nephrostomy tube malpositioned and has a planned exchange on .  This morning, pt was found to have sodium elevated at 160 and creatinine elevate to 1.4 (baseline creatinine ~1).    Pt c/o R flank pain radiating across abdomen.    REVIEW OF SYSTEMS: no H/A, CP, SOB.      VITALS:  T(F): 97.2 (22 @ 04:45), Max: 97.5 (22 @ 21:00)  HR: 62 (22 @ 04:45)  BP: 107/55 (22 @ 04:45)  RR: 16 (22 @ 04:45)  SpO2: 100% (22 @ 04:45)  Wt(kg): --     @ 07:01  -   @ 13:00  --------------------------------------------------------  IN: 240 mL / OUT: 0 mL / NET: 240 mL        PHYSICAL EXAM:  Constitutional: NAD, somewhat cachectic  HEENT: anicteric sclera, oropharynx clear, MMM, muscle wasting at temples  Neck: No JVD  Respiratory: CTAB, no wheezes, rales or rhonchi  Cardiovascular: S1, S2, RRR  Gastrointestinal: BS+, soft, NT/ND  Extremities: No cyanosis or clubbing. No peripheral edema  Neurological: A/O x 3, no focal deficits  Psychiatric: Normal mood, normal affect  : No CVA tenderness. No toth. + R nephrostomy tube without urine.      LABS:      136  |  106  |  41<H>  ----------------------------<  81  4.0   |  21<L>  |  1.40<H>    Ca    10.0      21 Aug 2022 06:37  Phos  3.1       Mg     2.6         TPro      /  Alb  2.6<L>  /  TBili      /  DBili      /  AST      /  ALT      /  AlkPhos          Creatinine Trend: 1.40 <--, 1.33 <--, 1.49 <--, 1.40 <--, 1.38 <--, 1.36 <--, 1.38 <--, 1.41 <--                        9.4    8.07  )-----------( 383      ( 21 Aug 2022 06:37 )             30.8     Urine Studies:  Urinalysis Basic - ( 16 Aug 2022 21:40 )    Color: Yellow / Appearance: Slightly Turbid / S.015 / pH:   Gluc:  / Ketone: Negative  / Bili: Negative / Urobili: Negative   Blood:  / Protein: 500 mg/dL / Nitrite: Negative   Leuk Esterase: Moderate / RBC: 10-25 /HPF / WBC 26-50 /HPF   Sq Epi:  / Non Sq Epi: Few /HPF / Bacteria: Moderate /HPF

## 2022-08-21 NOTE — PROGRESS NOTE ADULT - ASSESSMENT
1. CRYSTAL- mild CRYSTAL in the setting of infection and obstruction.  Renal fxn stable. Baseline creatinine ~ 1.  2. B hydronephrosis- Planned R nephrostomy tube exchange. Urology f/u thereafter.  3. Kidney Cysts- complex.  Also require urology f/u.  4. Kidney stones- non-obstructive.  Would also f/u with urology.  I can also do urine chemistry analysis as an outpatient if urology would prefer.  5. History of parathyorid tumor with hyperparathyroidism- calcium is borderline elevated.  However, serum calcium has been rising and a new albumin is necessary to correct for hypoalbuminemia.  check serum albumin, ionized calcium, phosphorus, and iPTH.  Pt may need endocrinology f/u, particularly given that there are non-obstructive kidney stones.  6. Hypernatremia- Lab error.  Na+ remains WNL.         [facility] NEPHROLOGY  Caden Nicolas M.D.  Abner Schofield D.O.  Sherita Murcia M.D.  Laverne Calabrese, LEONOR, ANP-C    Telephone: [phone]  Facsimile: [phone]    [address] [place] [address]

## 2022-08-22 NOTE — PROGRESS NOTE ADULT - PROBLEM SELECTOR PLAN 1
p/w dysuria, frequency  UA positive  Urine culture negative 8/15  rt nephrostomy culture 8/16 negative  bcx 8/15 negative  c/w- Cefepime   ID: Dr. Rodriguez.

## 2022-08-22 NOTE — PROGRESS NOTE ADULT - PROBLEM SELECTOR PLAN 2
p/w Scr 1.41  s/p IVF blous  Scr 1.40  Scr improving slowly 1.35 today  renal US- b/l hydronephrosis  avoid nephrotoxins  Monitor BMP  Nephro following- Dr. Murcia

## 2022-08-22 NOTE — DISCHARGE NOTE PROVIDER - HOSPITAL COURSE
· Assessment    56 year old female from home, post menopausal female, with PMH of Anemia, Asthma, Bladder cancer (previously on chemo until 5/2022) with  Dr. Renay Trinidad, R nephrostomy tube, hyperparathyroidism, bradycardia with PPM coming in for fevers and chills at home. Admitted for UTI. UA positive started on Abx, cefepime. Dr Jennifer CALIX consulted. renal US- b/l hydronephrosis, rt kidney stone and nephrostomy tube and left cyst. Ir consulted for nephrostomy tube- plan to change tube 8/22/22 under anesthesia. Pt. also noted to have CRYSTAL, s/p IVF bolus, worsening kidney fnx- Dr. Murcia consulted- nephro.  Patient is s/p IR guided right nephrostomy tube exchange. Continue cefepime. Scr slowly improving. Plan is to monitor pt for 24 hours post nephrostomy exchange and d/c home tomorrow 8/23 if medically stable.     · Assessment    56 year old female from home, post menopausal female, with PMH of Anemia, Asthma, Bladder cancer (previously on chemo until 5/2022) with  Dr. Renay Trinidad, R nephrostomy tube, hyperparathyroidism, bradycardia with PPM coming in for fevers and chills at home. Admitted for UTI. UA positive started on Abx, cefepime. Dr Jennifer CALIX consulted. renal US- b/l hydronephrosis, rt kidney stone and nephrostomy tube and left cyst. Ir consulted for nephrostomy tube- plan to change tube 8/22/22 under anesthesia. Pt. also noted to have CRYSTAL, s/p IVF bolus, worsening kidney fnx- Dr. Murcia consulted- nephro.  Patient is s/p IR guided right nephrostomy tube exchange. Continue cefepime. Scr slowly improving. Plan is to monitor pt for 24 hours post nephrostomy exchange and d/c home on 8/25 if medically stable. Patient refused discharge and was given 24h notice. Drainage from nephrostomy tube with clear yellow urine. Patient completed full course of IV Cefepime and is cleared medically for discharge.    Given patient's improved clinical status and current hemodynamic stability, decision was made to discharge.  Please refer to patient's complete medical chart with documents for a full hospital course, for this is only a brief summary.     · Assessment    56 year old female from home, post menopausal female, with PMH of Anemia, Asthma, Bladder cancer (previously on chemo until 5/2022) with  Dr. Renay Trinidad, R nephrostomy tube, hyperparathyroidism, bradycardia with PPM coming in for fevers and chills at home. Admitted for UTI. UA positive started on Abx, cefepime. Dr Jennifer CALIX consulted. renal US- b/l hydronephrosis, rt kidney stone and nephrostomy tube and left cyst. Ir consulted for nephrostomy tube- plan to change tube 8/22/22 under anesthesia. Pt. also noted to have CRYSTAL, s/p IVF bolus, worsening kidney fnx- Dr. Murcia consulted- nephro.  Patient is s/p IR guided right nephrostomy tube exchange. Continue cefepime. Scr slowly improving. Plan is to monitor pt for 24 hours post nephrostomy exchange and d/c home on 8/25 if medically stable. Patient refused discharge and was given 24h notice. Drainage from nephrostomy tube with clear yellow urine. Patient completed full course of IV Cefepime and is cleared medically for discharge.  Given patient's improved clinical status and current hemodynamic stability, decision was made to discharge.  Please refer to patient's complete medical chart with documents for a full hospital course, for this is only a brief summary.

## 2022-08-22 NOTE — DISCHARGE NOTE PROVIDER - NSDCCPCAREPLAN_GEN_ALL_CORE_FT
PRINCIPAL DISCHARGE DIAGNOSIS  Diagnosis: CRYSTAL (acute kidney injury)  Assessment and Plan of Treatment: Avoid taking (NSAIDs) - (ex: Ibuprofen, Advil, Celebrex, Naprosyn)  Avoid taking any nephrotoxic agents (can harm kidneys) - Intravenous contrast for diagnostic testing, combination cold medications.  Have all medications adjusted for your renal function by your Health Care Provider.  Blood pressure control is important.  Take all medication as prescribed.        SECONDARY DISCHARGE DIAGNOSES  Diagnosis: Acute UTI  Assessment and Plan of Treatment: HOME CARE INSTRUCTIONS  You had nephrostomy tube replaced and complete full treatmet of IV Cefepime.  Drink enough water and fluids to keep your urine clear or pale yellow.  Avoid caffeine, tea, and carbonated beverages. They tend to irritate your bladder.  Empty your bladder often. Avoid holding urine for long periods of time.  Empty your bladder before and after sexual intercourse.  After a bowel movement, women should cleanse from front to back. Use each tissue only once.  SEEK MEDICAL CARE IF:  You have back pain.  You develop a fever.  Your symptoms do not begin to resolve within 3 days.  SEEK IMMEDIATE MEDICAL CARE IF:  You have severe back pain or lower abdominal pain.  You develop chills.  You have nausea or vomiting.  You have continued burning or discomfort with urination.      Diagnosis: Anemia  Assessment and Plan of Treatment:      PRINCIPAL DISCHARGE DIAGNOSIS  Diagnosis: Acute UTI  Assessment and Plan of Treatment: You were found to have a acute complicated UTI  HOME CARE INSTRUCTIONS  You had nephrostomy tube replaced and complete full treatmet of IV antibiotics with Cefepime.  Drink enough water and fluids to keep your urine clear or pale yellow.  Avoid caffeine, tea, and carbonated beverages. They tend to irritate your bladder.  Empty your bladder often. Avoid holding urine for long periods of time.  Empty your bladder before and after sexual intercourse.  After a bowel movement, women should cleanse from front to back. Use each tissue only once.  SEEK MEDICAL CARE IF:  You have back pain.  You develop a fever.  Your symptoms do not begin to resolve within 3 days.  SEEK IMMEDIATE MEDICAL CARE IF:  You have severe back pain or lower abdominal pain.  You develop chills.  You have nausea or vomiting.  You have continued burning or discomfort with urination.        SECONDARY DISCHARGE DIAGNOSES  Diagnosis: CRYSTAL (acute kidney injury)  Assessment and Plan of Treatment: your kidney function was elevated more than your baseline likley due to UTI, you were given IV fluids your kidney function improved and remained stable at your baseline  while your kidneys are healing you should avoid agents that can cause kidney injury.  Some of these agents include NSAIDs, Gadolinium contrast, and Phosphate containing enemas.      Diagnosis: Nephrostomy status  Assessment and Plan of Treatment: your Nephrostomy tube was exchanged in IR on 8/22  continue Nephrostomy tube to leg bag drainage    Diagnosis: Anemia  Assessment and Plan of Treatment: Your blood count is lower than normal likely due to low baseline and bladder cancer, you did not have any sign or symptoms of bleeding   Symptoms to report, bleeding, palpitations, fatigue, pale skin, cold skin, dizziness. Take medications as ordered by PCP

## 2022-08-22 NOTE — DISCHARGE NOTE PROVIDER - NSDCCAREPROVSEEN_GEN_ALL_CORE_FT
Candelario Ochoa Candelario Ochoa  [facility]  Peter Schrier Nirav Mehta Dorothy Raczkowski Mohsena Amin Jeny Varghese Lauren Parente Christine Lau Asuka Pascual  [facility] ServiceAccount  Marilee Pete  User ADM  [facility]  Ever Richardson  Team [facility] ACP - NP Service  Team [facility] Pain Service      [ Greater than 35 min spent for discharge services.   LUIS ANGEL Ochoa ]

## 2022-08-22 NOTE — PROGRESS NOTE ADULT - ASSESSMENT
Patient is a 56y old  Female from home, post menopausal female, with PMH of Anemia, Asthma, Bladder cancer (previously on chemo until 5/2022) with  Dr. Renay Trinidad, R nephrostomy tube, hyperparathyroidism, bradycardia with PPM, now presents to the ER for evaluation of fever and chills at home.  She states that her nephrostomy tubes tends to get clogged often and she has been flushing it constantly.  Also she has been feeling lethargic, having Urinary frequency and abdominal pain. On admission, she has no fever, no Leukocytosis but cloudy urine and positive Urine analysis. She has started on Cefepime and the ID consult requested to assist with further evaluation and antibiotic management.    # Complicated UTI- in the setting of Right nephrostomy tube- Urine cx has no growth-  The right nephrostomy tube is not draining well, patient has to drain manually. She is s/p Right NT changed on 8/22/22, by IR.  # Bilateral hydronephrosis, mild on the right and moderate to severe on the left on Renal Us from 8/18/22  # Bladder cancer     would recommend:    1. Post-procedure labs  2. Monitor kidney function and adjust Abx doses accordingly  3. Continue Cefepime until 8/23/22  4. Pain management as needed  5. Bowel regimen as needed    d/w patient, covering NPSarah and Dr. Ochoa     Attending Attestation:    Spent more than 35 minutes on total encounter, more than 50 % of the visit was spent counseling and/or coordinating care by the Attending physician.

## 2022-08-22 NOTE — PROCEDURE NOTE - PROCEDURE FINDINGS AND DETAILS
Right sided nephrostomy tube changed via existing tract with difficulty passing a wire through.  Recommending patient to return for monthly tube exchanges.

## 2022-08-22 NOTE — DISCHARGE NOTE PROVIDER - NSDCMRMEDTOKEN_GEN_ALL_CORE_FT
ATORVASTATIN 40 MG TABLET: each orally once a day (at bedtime)  OLANZAPINE 5MG TAB: tab(s) orally once a day (at bedtime)  polyethylene glycol 3350 oral powder for reconstitution: 17 gram(s) orally once a day, As needed, Constipation  SYMBICORT 80-4.5 MCG INHALER: gram(s) inhaled 2 times a day  VITAMIN D3 25 MCG TABLET: each orally once a day   acetaminophen 325 mg oral tablet: 2 tab(s) orally every 6 hours, As needed, Temp greater or equal to 38C (100.4F), Mild Pain (1 - 3)  ATORVASTATIN 40 MG TABLET: each orally once a day (at bedtime)  diazePAM 5 mg oral tablet: 1 tab(s) orally 3 times a day, As needed, back spasm MDD:15mg  OLANZAPINE 5MG TAB: tab(s) orally once a day (at bedtime)  polyethylene glycol 3350 oral powder for reconstitution: 17 gram(s) orally once a day, As needed, Constipation  SYMBICORT 80-4.5 MCG INHALER: gram(s) inhaled 2 times a day  traMADol 50 mg oral tablet: 1 tab(s) orally every 6 hours, As needed, Moderate Pain (4 - 6) MDD:200mg  VITAMIN D3 25 MCG TABLET: each orally once a day

## 2022-08-22 NOTE — DISCHARGE NOTE PROVIDER - CARE PROVIDER_API CALL
Priscila Rosario  CARDIOVASCULAR DISEASE  [address], [address]  [place] [address]  Phone: [phone]  Fax: [phone]  Follow Up Time: 1 week    Sherita Murcia)  Internal Medicine  [address] [place] [id number]  Phone: [phone]  Fax: [phone]  Established Patient  Follow Up Time: 1 week

## 2022-08-22 NOTE — PROGRESS NOTE ADULT - ASSESSMENT
56 year old female from home, post menopausal female, with PMH of Anemia, Asthma, Bladder cancer (previously on chemo until 5/2022) with  Dr. Renay Trinidad, R nephrostomy tube, hyperparathyroidism, bradycardia with PPM coming in for fevers and chills at home. Admitted for UTI. UA positive started on Abx, cefepime. Dr Jennifer CALIX consulted. renal US- b/l hydronephrosis, rt kidney stone and nephrostomy tube and left cyst. Ir consulted for nephrostomy tube- plan to change tube 8/22/22 under anesthesia. Pt. also noted to have CRYSTAL, s/p IVF bolus, worsening kidney fnx- Dr. Murcia consulted- nephro.  Patient is s/p IR guided right nephrostomy tube exchange. Continue cefepime. Scr slowly improving. Plan is to monitor pt for 24 hours post nephrostomy exchange and d/c home tomorrow 8/23 if medically stable.

## 2022-08-22 NOTE — DISCHARGE NOTE PROVIDER - PROVIDER TOKENS
PROVIDER:[TOKEN:[[id number]],FOLLOWUP:[1 week]],PROVIDER:[TOKEN:[[id number]],FOLLOWUP:[1 week],ESTABLISHEDPATIENT:[T]]

## 2022-08-22 NOTE — PROGRESS NOTE ADULT - SUBJECTIVE AND OBJECTIVE BOX
Patient is seen and examined at the bed side, is afebrile. She is s/p Right nephrostomy tube changed this morning, tolerated the procedure well.       REVIEW OF SYSTEMS: All other review systems are negative      ALLERGIES: No Known Allergies      Vital Signs Last 24 Hrs  T(C): 36.2 (21 Aug 2022 04:45), Max: 36.4 (20 Aug 2022 21:00)  T(F): 97.2 (21 Aug 2022 04:45), Max: 97.5 (20 Aug 2022 21:00)  HR: 62 (21 Aug 2022 04:45) (61 - 62)  BP: 107/55 (21 Aug 2022 04:45) (92/64 - 107/55)  BP(mean): --  RR: 16 (21 Aug 2022 04:45) (16 - 18)  SpO2: 100% (21 Aug 2022 04:45) (100% - 100%)    Parameters below as of 21 Aug 2022 04:45  Patient On (Oxygen Delivery Method): room air        PHYSICAL EXAM:  GENERAL: Not in distress   CHEST/LUNG:  Not using accessory muscles   HEART: s1 and s2 present  ABDOMEN:  Nontender and  Nondistended  : new Right nephrostomy tubae in placed   EXTREMITIES: No pedal  edema  CNS: Awake and Alert      LABS:                        9.2    8.03  )-----------( 375      ( 22 Aug 2022 06:37 )             29.2                           9.4    8.07  )-----------( 383      ( 21 Aug 2022 06:37 )             30.8                08-22    141  |  109<H>  |  42<H>  ----------------------------<  87  4.4   |  23  |  1.35<H>    Ca    10.4      22 Aug 2022 06:37  Mg     2.6     08-22      08-21    136  |  106  |  41<H>  ----------------------------<  81  4.0   |  21<L>  |  1.40<H>    Ca    10.0      21 Aug 2022 06:37  Phos  3.1     08-20  Mg     2.6     08-21    TPro  x   /  Alb  2.6<L>  /  TBili  x   /  DBili  x   /  AST  x   /  ALT  x   /  AlkPhos  x         Urinalysis Basic - ( 16 Aug 2022 21:40 )  Color: Yellow / Appearance: Slightly Turbid / S.015 / pH: x  Gluc: x / Ketone: Negative  / Bili: Negative / Urobili: Negative   Blood: x / Protein: 500 mg/dL / Nitrite: Negative   Leuk Esterase: Moderate / RBC: 10-25 /HPF / WBC 26-50 /HPF   Sq Epi: x / Non Sq Epi: Few /HPF / Bacteria: Moderate /HPF        MEDICATIONS  (STANDING):    atorvastatin 40 milliGRAM(s) Oral at bedtime  budesonide  80 MICROgram(s)/formoterol 4.5 MICROgram(s) Inhaler 2 Puff(s) Inhalation two times a day  cefepime   IVPB 1000 milliGRAM(s) IV Intermittent every 12 hours  dextrose 5%. 1000 milliLiter(s) (70 mL/Hr) IV Continuous <Continuous>  heparin   Injectable 5000 Unit(s) SubCutaneous every 8 hours  OLANZapine 5 milliGRAM(s) Oral at bedtime  ondansetron Injectable 4 milliGRAM(s) IV Push once  pantoprazole    Tablet 40 milliGRAM(s) Oral before breakfast        RADIOLOGY & ADDITIONAL TESTS:    22: US Renal (22 @ 19:21) >Bilateral hydronephrosis, mild on the right and moderate to severe on the   left.  A right nephrostomy tube is present.  4 mm nonobstructive calculus in the right kidney.    2. Complex cysts with the solid components or debris in the left kidney. If clinically indicated, abdominal MR without and with IV contrast may be   pursued for further evaluation.    22: Xray Chest 1 View- PORTABLE-Urgent (22 @ 01:40) Redemonstration of bilateral metastatic lung nodules.      MICROBIOLOGY DATA:  Culture - Urine (08.15.22 @ 19:55)   Specimen Source: Clean Catch Clean Catch (Midstream)   Culture Results: <10,000 CFU/mL Normal Urogenital Vandana     COVID-19 PCR (08.15.22 @ 19:55)   COVID-19 PCR: NotDetec:     Culture - Blood (08.15.22 @ 19:50)   Specimen Source: .Blood Blood-Peripheral   Culture Results: No growth to date.     Culture - Blood (08.15.22 @ 19:45)   Specimen Source: .Blood Blood-Peripheral   Culture Results: No growth to date.

## 2022-08-22 NOTE — PROGRESS NOTE ADULT - PROBLEM SELECTOR PLAN 9
S/P nephrostomy tube change in IR ( monday 8/22/22)  Discharge planning is for tomorrow 8/23 pending clinical improvement  As per ID Dr. Rodriguez, pt will NOT require oral antibiotics on discharge

## 2022-08-22 NOTE — PROGRESS NOTE ADULT - SUBJECTIVE AND OBJECTIVE BOX
NP Note discussed with  primary attending    Patient is a 56y old  Female who presents with a chief complaint of UTI (22 Aug 2022 12:44)      INTERVAL HPI/OVERNIGHT EVENTS: no new complaints    MEDICATIONS  (STANDING):  atorvastatin 40 milliGRAM(s) Oral at bedtime  budesonide  80 MICROgram(s)/formoterol 4.5 MICROgram(s) Inhaler 2 Puff(s) Inhalation two times a day  dextrose 5%. 1000 milliLiter(s) (70 mL/Hr) IV Continuous <Continuous>  heparin   Injectable 5000 Unit(s) SubCutaneous every 8 hours  OLANZapine 5 milliGRAM(s) Oral at bedtime  ondansetron Injectable 4 milliGRAM(s) IV Push once  pantoprazole    Tablet 40 milliGRAM(s) Oral before breakfast    MEDICATIONS  (PRN):  acetaminophen     Tablet .. 650 milliGRAM(s) Oral every 6 hours PRN Temp greater or equal to 38C (100.4F), Moderate Pain (4 - 6)  aluminum hydroxide/magnesium hydroxide/simethicone Suspension 30 milliLiter(s) Oral every 4 hours PRN Dyspepsia  melatonin 3 milliGRAM(s) Oral at bedtime PRN Insomnia  polyethylene glycol 3350 17 Gram(s) Oral daily PRN Constipation      __________________________________________________  REVIEW OF SYSTEMS:    CONSTITUTIONAL: No fever,   EYES: no acute visual disturbances  NECK: No pain or stiffness  RESPIRATORY: No cough; No shortness of breath  CARDIOVASCULAR: No chest pain, no palpitations  GASTROINTESTINAL: No pain. No nausea or vomiting; No diarrhea   NEUROLOGICAL: No headache or numbness, no tremors  MUSCULOSKELETAL: No joint pain, no muscle pain  GENITOURINARY: no dysuria, no frequency, no hesitancy  PSYCHIATRY: no depression , no anxiety  ALL OTHER  ROS negative        Vital Signs Last 24 Hrs  T(C): 36.3 (22 Aug 2022 12:53), Max: 36.9 (21 Aug 2022 21:14)  T(F): 97.3 (22 Aug 2022 12:53), Max: 98.4 (21 Aug 2022 21:14)  HR: 71 (22 Aug 2022 12:53) (52 - 71)  BP: 104/32 (22 Aug 2022 12:53) (102/54 - 130/67)  BP(mean): 82 (22 Aug 2022 10:57) (68 - 90)  RR: 20 (22 Aug 2022 12:53) (13 - 20)  SpO2: 99% (22 Aug 2022 12:53) (99% - 100%)    Parameters below as of 22 Aug 2022 12:53  Patient On (Oxygen Delivery Method): room air        ________________________________________________  PHYSICAL EXAM:  GENERAL: NAD  HEENT: Normocephalic;  conjunctivae and sclerae clear; moist mucous membranes;   NECK : supple  CHEST/LUNG: Clear to ausculitation bilaterally with good air entry   HEART: S1 S2  regular; no murmurs, gallops or rubs  ABDOMEN: + right flank nephrostomy tube, Soft, Nontender, Nondistended; Bowel sounds present  EXTREMITIES: no cyanosis; no edema; no calf tenderness  SKIN: warm and dry; no rash  NERVOUS SYSTEM:  Awake and alert; Oriented  to place, person and time ; no new deficits    _________________________________________________  LABS:                        9.2    8.03  )-----------( 375      ( 22 Aug 2022 06:37 )             29.2     08-22    141  |  109<H>  |  42<H>  ----------------------------<  87  4.4   |  23  |  1.35<H>    Ca    10.4      22 Aug 2022 06:37  Mg     2.6     08-22          CAPILLARY BLOOD GLUCOSE      POCT Blood Glucose.: 100 mg/dL (22 Aug 2022 11:12)  POCT Blood Glucose.: 53 mg/dL (22 Aug 2022 10:38)  POCT Blood Glucose.: 82 mg/dL (22 Aug 2022 07:46)  POCT Blood Glucose.: 94 mg/dL (21 Aug 2022 21:25)  POCT Blood Glucose.: 110 mg/dL (21 Aug 2022 16:34)        RADIOLOGY & ADDITIONAL TESTS:    Imaging Personally Reviewed:  YES    Consultant(s) Notes Reviewed:   YES    Care Discussed with Consultants :     Plan of care was discussed with patient and /or primary care giver; all questions and concerns were addressed and care was aligned with patient's wishes.

## 2022-08-22 NOTE — PROGRESS NOTE ADULT - ASSESSMENT
( note written / Date of service   08-22-22 )    ==================>> MEDICATIONS <<====================    atorvastatin 40 milliGRAM(s) Oral at bedtime  budesonide  80 MICROgram(s)/formoterol 4.5 MICROgram(s) Inhaler 2 Puff(s) Inhalation two times a day  dextrose 5%. 1000 milliLiter(s) IV Continuous <Continuous>  heparin   Injectable 5000 Unit(s) SubCutaneous every 8 hours  OLANZapine 5 milliGRAM(s) Oral at bedtime  ondansetron Injectable 4 milliGRAM(s) IV Push once  pantoprazole    Tablet 40 milliGRAM(s) Oral before breakfast    MEDICATIONS  (PRN):  acetaminophen     Tablet .. 650 milliGRAM(s) Oral every 6 hours PRN Temp greater or equal to 38C (100.4F), Moderate Pain (4 - 6)  aluminum hydroxide/magnesium hydroxide/simethicone Suspension 30 milliLiter(s) Oral every 4 hours PRN Dyspepsia  melatonin 3 milliGRAM(s) Oral at bedtime PRN Insomnia  polyethylene glycol 3350 17 Gram(s) Oral daily PRN Constipation    ___________  Active diet:  Diet, Regular  ___________________    ==================>> VITAL SIGNS <<==================     Vital Signs Last 24 HrsT(C): 36.3 (08-22-22 @ 12:53)  T(F): 97.3 (08-22-22 @ 12:53), Max: 98.4 (08-21-22 @ 21:14)  HR: 71 (08-22-22 @ 12:53) (52 - 71)  BP: 104/32 (08-22-22 @ 12:53)  RR: 20 (08-22-22 @ 12:53) (13 - 20)  SpO2: 99% (08-22-22 @ 12:53) (99% - 100%)      CAPILLARY BLOOD GLUCOSE      POCT Blood Glucose.: 100 mg/dL (22 Aug 2022 11:12)  POCT Blood Glucose.: 53 mg/dL (22 Aug 2022 10:38)  POCT Blood Glucose.: 82 mg/dL (22 Aug 2022 07:46)  POCT Blood Glucose.: 94 mg/dL (21 Aug 2022 21:25)     ==================>> LAB AND IMAGING <<==================                        9.2    8.03  )-----------( 375      ( 22 Aug 2022 06:37 )             29.2        08-22    141  |  109<H>  |  42<H>  ----------------------------<  87  4.4   |  23  |  1.35<H>    Ca    10.4      22 Aug 2022 06:37  Mg     2.6     08-22         _________________________________________________________________________________________  ========>>  M E D I C A L   A T T E N D I N G    F O L L O W  U P  N O T E  <<=========  -----------------------------------------------------------------------------------------------------    - Patient seen and examined by me approximately sixty minutes ago.  - In summary,  JEF HOUSE is a 56y year old woman admitted with pain in back., possible UTI   - Patient today overall doing fairly .. somewhat uncomfortable, + pain in Rt flank post procedure ) tube change this morning)     ==================>> REVIEW OF SYSTEM <<=================    GEN: no fever, no chills, as above , +  pain in Rt flank and back , + tired   RESP: no SOB, no cough, no sputum  CVS: no chest pain, no palpitations, no edema  GI: no abdominal pain, no nausea, no constipation, no diarrhea  : no dysuria, no frequency, no hematuria, nephrostomy just changed today   Neuro: no headache, no dizziness  Derm : no itching, no rash    ==================>> PHYSICAL EXAM <<=================    GEN: A&O X 3 , NAD , uncomfortable, calm but tearful at times cachectic   HEENT: NCAT, PERRL, MMM, hearing intact, temporal wasting   Neck: supple , no JVD appreciated  CVS: S1S2 , regular , No M/R/G appreciated  PULM: CTA B/L,  no W/R/R appreciated  ABD.: soft. non tender, non distended,  bowel sounds present  Extrem: intact pulses , no edema   PSYCH : normal mood,  not anxious, as above                              ( note written / Date of service   08-22-22 )    ==================>> MEDICATIONS <<====================    atorvastatin 40 milliGRAM(s) Oral at bedtime  budesonide  80 MICROgram(s)/formoterol 4.5 MICROgram(s) Inhaler 2 Puff(s) Inhalation two times a day  dextrose 5%. 1000 milliLiter(s) IV Continuous <Continuous>  heparin   Injectable 5000 Unit(s) SubCutaneous every 8 hours  OLANZapine 5 milliGRAM(s) Oral at bedtime  ondansetron Injectable 4 milliGRAM(s) IV Push once  pantoprazole    Tablet 40 milliGRAM(s) Oral before breakfast    MEDICATIONS  (PRN):  acetaminophen     Tablet .. 650 milliGRAM(s) Oral every 6 hours PRN Temp greater or equal to 38C (100.4F), Moderate Pain (4 - 6)  aluminum hydroxide/magnesium hydroxide/simethicone Suspension 30 milliLiter(s) Oral every 4 hours PRN Dyspepsia  melatonin 3 milliGRAM(s) Oral at bedtime PRN Insomnia  polyethylene glycol 3350 17 Gram(s) Oral daily PRN Constipation    ___________  Active diet:  Diet, Regular  ___________________    ==================>> VITAL SIGNS <<==================     Vital Signs Last 24 HrsT(C): 36.3 (08-22-22 @ 12:53)  T(F): 97.3 (08-22-22 @ 12:53), Max: 98.4 (08-21-22 @ 21:14)  HR: 71 (08-22-22 @ 12:53) (52 - 71)  BP: 104/32 (08-22-22 @ 12:53)  RR: 20 (08-22-22 @ 12:53) (13 - 20)  SpO2: 99% (08-22-22 @ 12:53) (99% - 100%)      CAPILLARY BLOOD GLUCOSE  POCT Blood Glucose.: 100 mg/dL (22 Aug 2022 11:12)  POCT Blood Glucose.: 53 mg/dL (22 Aug 2022 10:38)  POCT Blood Glucose.: 82 mg/dL (22 Aug 2022 07:46)  POCT Blood Glucose.: 94 mg/dL (21 Aug 2022 21:25)     ==================>> LAB AND IMAGING <<==================                        9.2    8.03  )-----------( 375      ( 22 Aug 2022 06:37 )             29.2        08-22    141  |  109<H>  |  42<H>  ----------------------------<  87  4.4   |  23  |  1.35<H>    Ca    10.4      22 Aug 2022 06:37  Mg     2.6     08-22      ___________________________________________________________________________________  ===============>>  A S S E S S M E N T   A N D   P L A N <<===============  ------------------------------------------------------------------------------------------    · Assessment	  This is a 56 year old female from home, post menopausal female, with PMH of Anemia, Asthma, Bladder cancer (previously on chemo until 5/2022), R nephrostomy tube, hyperparathyroidism, bradycardia with PPM coming in UTI.     Problem/Plan - 1:  ·  Problem:  complicated UTI in pt with cancer and nephrostomy tube   UA positive  finish ABx as per ID  nephrostomy change done this morning     ** gradually worsening renal function / creatinine and chronic proteinuria , now also with hypernatremia        CRYSTAL on CKD II      nephrology following, appreciated      monitor BMP     encouraged Po hydration      prteinurea : unclear etiology >> monitor for now    Problem/Plan - 2:  ·  Problem: Asthma.   ·  Plan: - Pt has a history of Asthma.   - Will continue pts home medication Symbicort.    Problem/Plan - 3:  ·  Problem: Bladder cancer.   ·  Plan: - Pt has a history of bladder cancer with mets to the liver.   - follows  Dr. Renay Trinidad,  - pt has a PET scan scheduled later this month to monitor progress..     Problem/Plan - 4:  ·  Problem: HLD (hyperlipidemia).   ·  Plan: - Pt has a history of HLD.   - Will continue pts home medication Atorvastatin.    Problem/Plan - 5:  ·  Problem: HTN (hypertension).   ·  Plan: - Pt has a history of HTN.   - Currently not on any medication.   - Continue to monitor.    Problem/Plan - 6:  ·  Problem: Prophylactic measure.   ·  Plan: - Heparin - DVT.  encouraged PO intake     dispo planing home    --------------------------------------------  Case discussed with pt, NO  ___________________________  H. NIEVES Ochoa.  Pager: [phone]

## 2022-08-23 NOTE — PROGRESS NOTE ADULT - ASSESSMENT
_________________________________________________________________________________________  ========>>  M E D I C A L   A T T E N D I N G    F O L L O W  U P  N O T E  <<=========  -----------------------------------------------------------------------------------------------------    - Patient seen and examined by me approximately sixty minutes ago.  - In summary,  JEF HOUSE is a 56y year old woman admitted with pain in back., possible UTI   - Patient today in tremendous amount of pain , crying .. she states pain in both kidnies  but she is pointing to lower back .area ..   eating some "trying"     ==================>> REVIEW OF SYSTEM <<=================    GEN: no fever, no chills, as above , +  pain as above   RESP: no SOB, no cough, no sputum  CVS: no chest pain, no palpitations, no edema  GI: no abdominal pain, no nausea,  : no dysuria, no frequency, no hematuria, reports dark urine   Neuro: no headache, no dizziness  Derm : no itching, no rash    ==================>> PHYSICAL EXAM <<=================    GEN: A&O X 3 , NAD , uncomfortable, calm but tearful at times cachectic   HEENT: NCAT, PERRL, MMM, hearing intact, temporal wasting   Neck: supple , no JVD appreciated  CVS: S1S2 , regular , No M/R/G appreciated  PULM: CTA B/L,  no W/R/R appreciated  ABD.: soft. non tender, non distended,  bowel sounds present  Extrem: intact pulses , no edema   PSYCH : normal mood,  not anxious, as above                              ( Note written / Date of service 08-23-22 )    ==================>> MEDICATIONS <<====================    atorvastatin 40 milliGRAM(s) Oral at bedtime  budesonide  80 MICROgram(s)/formoterol 4.5 MICROgram(s) Inhaler 2 Puff(s) Inhalation two times a day  dextrose 5%. 1000 milliLiter(s) IV Continuous <Continuous>  heparin   Injectable 5000 Unit(s) SubCutaneous every 8 hours  OLANZapine 5 milliGRAM(s) Oral at bedtime  ondansetron Injectable 4 milliGRAM(s) IV Push once  pantoprazole    Tablet 40 milliGRAM(s) Oral before breakfast    MEDICATIONS  (PRN):  acetaminophen     Tablet .. 650 milliGRAM(s) Oral every 6 hours PRN Temp greater or equal to 38C (100.4F), Mild Pain (1 - 3)  aluminum hydroxide/magnesium hydroxide/simethicone Suspension 30 milliLiter(s) Oral every 4 hours PRN Dyspepsia  melatonin 3 milliGRAM(s) Oral at bedtime PRN Insomnia  polyethylene glycol 3350 17 Gram(s) Oral daily PRN Constipation  traMADol 25 milliGRAM(s) Oral every 6 hours PRN Moderate Pain (4 - 6)    ___________  Active diet:  Diet, Regular  ___________________    ==================>> VITAL SIGNS <<==================    Vital Signs Last 24 HrsT(C): 36.2 (08-23-22 @ 05:08)  T(F): 97.2 (08-23-22 @ 05:08), Max: 97.7 (08-22-22 @ 11:12)  HR: 68 (08-23-22 @ 05:08) (56 - 71)  BP: 103/56 (08-23-22 @ 05:08)  RR: 18 (08-23-22 @ 05:08) (13 - 20)  SpO2: 100% (08-23-22 @ 05:08) (99% - 100%)      CAPILLARY BLOOD GLUCOSE  POCT Blood Glucose.: 100 mg/dL (23 Aug 2022 07:36)  POCT Blood Glucose.: 91 mg/dL (22 Aug 2022 16:46)  POCT Blood Glucose.: 100 mg/dL (22 Aug 2022 11:12)     ==================>> LAB AND IMAGING <<==================                        9.5    9.31  )-----------( 397      ( 23 Aug 2022 06:20 )             30.5        08-23    137  |  106  |  25<H>  ----------------------------<  82  4.5   |  24  |  1.30    Ca    10.5      23 Aug 2022 06:20  Mg     2.6     08-22      WBC count:   9.31 <<== ,  8.03 <<== ,  8.07 <<== ,  7.50 <<== ,  7.38 <<==   Hemoglobin:   9.5 <<==,  9.2 <<==,  9.4 <<==,  9.1 <<==,  9.0 <<==  platelets:  397 <==, 375 <==, 383 <==, 390 <==, 370 <==, 311 <==    Creatinine:  1.30  <<==, 1.35  <<==, 1.40  <<==, 1.33  <<==, 1.49  <<==, 1.40  <<==  Sodium:   137  <==, 141  <==, 136  <==, 137  <==, 140  <==, 161  <==, 140  <==          ____________________________    M I C R O B I O L O G Y :    Culture - Urine (collected 16 Aug 2022 21:40)  Source: .Urine Nephrostomy - Right  Final Report (18 Aug 2022 09:15):    No growth    Culture - Urine (collected 15 Aug 2022 19:55)  Source: Clean Catch Clean Catch (Midstream)  Final Report (16 Aug 2022 18:02):    <10,000 CFU/mL Normal Urogenital Vandana    Culture - Blood (collected 15 Aug 2022 19:50)  Source: .Blood Blood-Peripheral  Final Report (20 Aug 2022 22:00):    No Growth Final    Culture - Blood (collected 15 Aug 2022 19:45)  Source: .Blood Blood-Peripheral  Final Report (20 Aug 2022 22:00):    No Growth Final    ___________________________________________________________________________________  ===============>>  A S S E S S M E N T   A N D   P L A N <<===============  ------------------------------------------------------------------------------------------    · Assessment	  This is a 56 year old female from home, post menopausal female, with PMH of Anemia, Asthma, Bladder cancer (previously on chemo until 5/2022), R nephrostomy tube, hyperparathyroidism, bradycardia with PPM coming in UTI.     Problem/Plan - 1:  ·  Problem:  complicated UTI in pt with cancer and nephrostomy tube   finish ABx as per ID and DC   nephrostomy change done     ** gradually worsening renal function / creatinine and chronic proteinuria , now also with hypernatremia        CRYSTAL on CKD II      nephrology following, appreciated      monitor BMP     encouraged Po hydration      prteinurea : unclear etiology >> monitor for now    Problem/Plan - 2:  ·  Problem: Asthma.   ·  Plan: - Pt has a history of Asthma.   - Will continue pts home medication Symbicort.    Problem/Plan - 3:  ·  Problem: Bladder cancer.   ·  Plan: - Pt has a history of bladder cancer with mets to the liver.   - follows  Dr. Renay Trinidad,  - pt has a PET scan scheduled later this month to monitor progress..     >> pain likely realted to cancer and not kidney infection as pt tinks..        unfortunately pt declining to have further CT scans, declining to take narcotics for pain mgmt, not interested in seeing pain mgmt because is afraid to take those meds..              pt agreed to try tramadol for now   ; will also give valium 5 mg as muscle relaxant as pain might also be musculoskeletal     Problem/Plan - 4:  ·  Problem: HLD (hyperlipidemia).   ·  Plan: - Pt has a history of HLD.   - Will continue pts home medication Atorvastatin.    Problem/Plan - 5:  ·  Problem: HTN (hypertension).   ·  Plan: - Pt has a history of HTN.   - Currently not on any medication.   - Continue to monitor.    Problem/Plan - 6:  ·  Problem: Prophylactic measure.   ·  Plan: - Heparin - DVT.  encouraged PO intake     dispo planing home when more stable     --------------------------------------------  Case discussed with pt, NP  ___________________________  H. NIEVES Ochoa.  Pager: [phone]

## 2022-08-23 NOTE — PROGRESS NOTE ADULT - SUBJECTIVE AND OBJECTIVE BOX
Patient is seen and examined at the bed side, is afebrile. She is still c/o pain and seen by Pain management team.      REVIEW OF SYSTEMS: All other review systems are negative      ALLERGIES: No Known Allergies      Vital Signs Last 24 Hrs  T(C): 36.6 (23 Aug 2022 12:38), Max: 36.6 (23 Aug 2022 12:38)  T(F): 97.9 (23 Aug 2022 12:38), Max: 97.9 (23 Aug 2022 12:38)  HR: 72 (23 Aug 2022 12:38) (68 - 72)  BP: 110/61 (23 Aug 2022 12:38) (101/51 - 110/61)  BP(mean): --  RR: 18 (23 Aug 2022 12:38) (18 - 20)  SpO2: 96% (23 Aug 2022 12:38) (96% - 100%)    Parameters below as of 23 Aug 2022 12:38  Patient On (Oxygen Delivery Method): room air        PHYSICAL EXAM:  GENERAL: Not in distress   CHEST/LUNG:  Not using accessory muscles   HEART: s1 and s2 present  ABDOMEN:  Nontender and  Nondistended  : new Right nephrostomy tubae in placed   EXTREMITIES: No pedal  edema  CNS: Awake and Alert      LABS:                        9.5    9.31  )-----------( 397      ( 23 Aug 2022 06:20 )             30.5                           9.2    8.03  )-----------( 375      ( 22 Aug 2022 06:37 )             29.2                 08-23    137  |  106  |  25<H>  ----------------------------<  82  4.5   |  24  |  1.30    Ca    10.5      23 Aug 2022 06:20  Mg     2.6     08-22               08-22    141  |  109<H>  |  42<H>  ----------------------------<  87  4.4   |  23  |  1.35<H>    Ca    10.4      22 Aug 2022 06:37  Mg     2.6     08-    TPro  x   /  Alb  2.6<L>  /  TBili  x   /  DBili  x   /  AST  x   /  ALT  x   /  AlkPhos  x   08-20      Urinalysis Basic - ( 16 Aug 2022 21:40 )  Color: Yellow / Appearance: Slightly Turbid / S.015 / pH: x  Gluc: x / Ketone: Negative  / Bili: Negative / Urobili: Negative   Blood: x / Protein: 500 mg/dL / Nitrite: Negative   Leuk Esterase: Moderate / RBC: 10-25 /HPF / WBC 26-50 /HPF   Sq Epi: x / Non Sq Epi: Few /HPF / Bacteria: Moderate /HPF        MEDICATIONS  (STANDING):    atorvastatin 40 milliGRAM(s) Oral at bedtime  budesonide  80 MICROgram(s)/formoterol 4.5 MICROgram(s) Inhaler 2 Puff(s) Inhalation two times a day  cefepime   IVPB 1000 milliGRAM(s) IV Intermittent every 8 hours  dextrose 5%. 1000 milliLiter(s) (70 mL/Hr) IV Continuous <Continuous>  heparin   Injectable 5000 Unit(s) SubCutaneous every 8 hours  OLANZapine 5 milliGRAM(s) Oral at bedtime  ondansetron Injectable 4 milliGRAM(s) IV Push once  pantoprazole    Tablet 40 milliGRAM(s) Oral before breakfast        RADIOLOGY & ADDITIONAL TESTS:    22: US Renal (22 @ 19:21) >Bilateral hydronephrosis, mild on the right and moderate to severe on the   left.  A right nephrostomy tube is present.  4 mm nonobstructive calculus in the right kidney.    2. Complex cysts with the solid components or debris in the left kidney. If clinically indicated, abdominal MR without and with IV contrast may be   pursued for further evaluation.    22: Xray Chest 1 View- PORTABLE-Urgent (22 @ 01:40) Redemonstration of bilateral metastatic lung nodules.      MICROBIOLOGY DATA:  Culture - Urine (08.15.22 @ 19:55)   Specimen Source: Clean Catch Clean Catch (Midstream)   Culture Results: <10,000 CFU/mL Normal Urogenital Vandana     COVID-19 PCR (08.15.22 @ 19:55)   COVID-19 PCR: NotDetec:     Culture - Blood (08.15.22 @ 19:50)   Specimen Source: .Blood Blood-Peripheral   Culture Results: No growth to date.     Culture - Blood (08.15.22 @ 19:45)   Specimen Source: .Blood Blood-Peripheral   Culture Results: No growth to date.

## 2022-08-23 NOTE — CONSULT NOTE ADULT - SUBJECTIVE AND OBJECTIVE BOX
CHIEF COMPLAINT:Patient is a 56y old  Female who presents with a chief complaint of UTI (23 Aug 2022 12:23)      HPI:  This is a 56 year old female from home, post menopausal female, with PMH of Anemia, Asthma, Bladder cancer (previously on chemo until 5/2022) with  Dr. Renay Trinidad R nephrostomy tube, hyperparathyroidism, bradycardia with PPM coming in for fevers and chills at home.   She states that her nephrostomy tubes tends to get clogged often and she has been flushing it constantly. Since yesterday she has been feeling lethargic, with chills and abdominal pain. She states in the past similar symptoms occurred when she was found to have UTI.   She denies any headaches, visual disturbances, dizziness, falls, chest pain, palpitations, lower extremity swelling, fevers, skin rash, recent travel, or sick contacts      PAST MEDICAL & SURGICAL HISTORY:  Anemia      Asthma      HLD (hyperlipidemia)      Pacemaker  [employer]      Bladder cancer      HTN (hypertension)      Parathyroid tumor      Liver cyst      Hydronephrosis  has right Nephrostomy tube - dressing intact      Bradycardia      History of renal calculi      H/O myomectomy      Presence of cardiac pacemaker      H/O hydronephrosis  s/p Right Perc nephrostomy tube placement 02/2021, exchange 06/2021          MEDICATIONS  (STANDING):  atorvastatin 40 milliGRAM(s) Oral at bedtime  budesonide  80 MICROgram(s)/formoterol 4.5 MICROgram(s) Inhaler 2 Puff(s) Inhalation two times a day  cefepime   IVPB 1000 milliGRAM(s) IV Intermittent every 8 hours  dextrose 5%. 1000 milliLiter(s) (70 mL/Hr) IV Continuous <Continuous>  heparin   Injectable 5000 Unit(s) SubCutaneous every 8 hours  OLANZapine 5 milliGRAM(s) Oral at bedtime  ondansetron Injectable 4 milliGRAM(s) IV Push once  pantoprazole    Tablet 40 milliGRAM(s) Oral before breakfast    MEDICATIONS  (PRN):  acetaminophen     Tablet .. 650 milliGRAM(s) Oral every 6 hours PRN Temp greater or equal to 38C (100.4F), Mild Pain (1 - 3)  aluminum hydroxide/magnesium hydroxide/simethicone Suspension 30 milliLiter(s) Oral every 4 hours PRN Dyspepsia  melatonin 3 milliGRAM(s) Oral at bedtime PRN Insomnia  polyethylene glycol 3350 17 Gram(s) Oral daily PRN Constipation  traMADol 25 milliGRAM(s) Oral every 6 hours PRN Moderate Pain (4 - 6)      FAMILY HISTORY:  Family history of prostate cancer (Father)    Family history of CHF (congestive heart failure) (Father)    Family history of atrial fibrillation (Father)        SOCIAL HISTORY:    [ x] Non-smoker  [ ] Smoker  [ ] Alcohol    Allergies    No Known Allergies    Intolerances    	    REVIEW OF SYSTEMS:  CONSTITUTIONAL: No fever, weight loss, or fatigue  EYES: No eye pain, visual disturbances, or discharge  ENT:  No difficulty hearing, tinnitus, vertigo; No sinus or throat pain  NECK: No pain or stiffness  RESPIRATORY: No cough, wheezing, chills or hemoptysis; +Shortness of Breath  CARDIOVASCULAR: No chest pain, palpitations, passing out, dizziness, or leg swelling  GASTROINTESTINAL: No abdominal or epigastric pain. No nausea, vomiting, or hematemesis; No diarrhea or constipation. No melena or hematochezia.  GENITOURINARY: No dysuria, frequency, hematuria, or incontinence  NEUROLOGICAL: No headaches, memory loss, loss of strength, numbness, or tremors  SKIN: No itching, burning, rashes, or lesions   LYMPH Nodes: No enlarged glands  ENDOCRINE: No heat or cold intolerance; No hair loss  MUSCULOSKELETAL: No joint pain or swelling; No muscle, back, or extremity pain  PSYCHIATRIC: No depression, anxiety, mood swings, or difficulty sleeping  HEME/LYMPH: No easy bruising, or bleeding gums  ALLERGY AND IMMUNOLOGIC: No hives or eczema	    [ ] All others negative	  [ ] Unable to obtain    PHYSICAL EXAM:  T(C): 36.6 (08-23-22 @ 12:38), Max: 36.6 (08-23-22 @ 12:38)  HR: 72 (08-23-22 @ 12:38) (68 - 72)  BP: 110/61 (08-23-22 @ 12:38) (101/51 - 110/61)  RR: 18 (08-23-22 @ 12:38) (18 - 20)  SpO2: 96% (08-23-22 @ 12:38) (96% - 100%)  Wt(kg): --  I&O's Summary    22 Aug 2022 07:01  -  23 Aug 2022 07:00  --------------------------------------------------------  IN: 140 mL / OUT: 950 mL / NET: -810 mL    23 Aug 2022 07:01  -  23 Aug 2022 19:20  --------------------------------------------------------  IN: 0 mL / OUT: 600 mL / NET: -600 mL        Appearance: Normal	  HEENT:   Normal oral mucosa, PERRL, EOMI	  Lymphatic: No lymphadenopathy  Cardiovascular: Normal S1 S2, No JVD, + murmurs, No edema  Respiratory: Lungs clear to auscultation	  Psychiatry: A & O x 3, Mood & affect appropriate  Gastrointestinal:  Soft, Non-tender, + BS	  Skin: No rashes, No ecchymoses, No cyanosis	  Neurologic: Non-focal  Extremities: Normal range of motion, No clubbing, cyanosis or edema  Vascular: Peripheral pulses palpable 2+ bilaterally    TELEMETRY: 	    ECG:  	  RADIOLOGY:  OTHER: 	  	  LABS:	 	    CARDIAC MARKERS:                              9.5    9.31  )-----------( 397      ( 23 Aug 2022 06:20 )             30.5     08-23    137  |  106  |  25<H>  ----------------------------<  82  4.5   |  24  |  1.30    Ca    10.5      23 Aug 2022 06:20  Mg     2.6     08-22      proBNP:   Lipid Profile:   HgA1c:   TSH:       PREVIOUS DIAGNOSTIC TESTING:       < from: 12 Lead ECG (08.16.22 @ 02:07) >  Diagnosis Line Normal sinus rhythm  Possible Left atrial enlargement  Borderline ECG    < from: Transthoracic Echocardiogram (01.29.16 @ 07:16) >  1. Normal mitral valve. Mild mitral regurgitation.  2. Normal trileaflet aortic valve.  3. Normal aortic root.  4. Normal left atrium.  5. Normal left ventricular internal dimensions and wall  thicknesses.  6. Normal LeftVentricular Systolic Function,  (EF = 55 to  60%)  7. Grade II diastolic dysfunction  8. Normal right atrium.  9. Normal right ventricular size and function. A device  lead is visualized in the right heart.  10. RA Pressure is 10 mm Hg.  11. RVS Pressure is 35 mm Hg.  12. There is mild tricuspid regurgitation.  13. Normal pericardium with no pericardial effusion.    Culture - Urine (08.16.22 @ 21:40)    Specimen Source: .Urine Nephrostomy - Right    Culture Results:   No growth

## 2022-08-23 NOTE — PROGRESS NOTE ADULT - PROBLEM SELECTOR PLAN 9
S/P nephrostomy tube change in IR ( monday 8/22/22)  Discharge planning is for tomorrow 8/24  pending pain improvement  As per ID Dr. Rodriguez, pt will NOT require oral antibiotics on discharge

## 2022-08-23 NOTE — PROGRESS NOTE ADULT - ASSESSMENT
56 year old female from home, post menopausal female, with PMH of Anemia, Asthma, Bladder cancer (previously on chemo until 5/2022) with  Dr. Renay Trinidad, R nephrostomy tube, hyperparathyroidism, bradycardia with PPM coming in for fevers and chills at home. Admitted for UTI. UA positive started on Abx, cefepime. Dr Jennifer CALIX consulted. renal US- b/l hydronephrosis, rt kidney stone and nephrostomy tube and left cyst. Ir consulted for nephrostomy tube- plan to change tube 8/22/22 under anesthesia. Pt. also noted to have CRYSTAL, s/p IVF bolus, worsening kidney fnx- Dr. Murcia consulted- nephro.  Patient is s/p IR guided right nephrostomy tube exchange. Continue cefepime. Scr slowly improving. Plan is to monitor pt for 24 hours post nephrostomy exchange and d/c home tomorrow 8/23 if medically stable.  Patient complaining of lower back pain, pt is afebrile. Toradol ordered. Continue antibiotics as per ID. Will monitor patient's pain. Discharge planning for AM 8/24 56 year old female from home, post menopausal female, with PMH of Anemia, Asthma, Bladder cancer (previously on chemo until 5/2022) with  Dr. Renay Trinidad, R nephrostomy tube, hyperparathyroidism, bradycardia with PPM coming in for fevers and chills at home. Admitted for UTI. UA positive started on Abx, cefepime. Dr Jennifer CALIX consulted. renal US- b/l hydronephrosis, rt kidney stone and nephrostomy tube and left cyst. Ir consulted for nephrostomy tube- plan to change tube 8/22/22 under anesthesia. Pt. also noted to have CRYSTAL, s/p IVF bolus, worsening kidney fnx- Dr. Murcia consulted- nephro.  Patient is s/p IR guided right nephrostomy tube exchange. Continue cefepime. Scr slowly improving. Plan is to monitor pt for 24 hours post nephrostomy exchange and d/c home tomorrow 8/23 if medically stable.  Patient complaining of lower back pain, pt is afebrile. Toradol ordered. Continue antibiotics as per ID until 8/23. Will monitor patient's pain. Discharge planning for AM 8/24

## 2022-08-23 NOTE — PROGRESS NOTE ADULT - SUBJECTIVE AND OBJECTIVE BOX
NP Note discussed with  primary attending    Patient is a 56y old  Female who presents with a chief complaint of UTI (23 Aug 2022 10:34)      INTERVAL HPI/OVERNIGHT EVENTS: no new complaints    MEDICATIONS  (STANDING):  atorvastatin 40 milliGRAM(s) Oral at bedtime  budesonide  80 MICROgram(s)/formoterol 4.5 MICROgram(s) Inhaler 2 Puff(s) Inhalation two times a day  dextrose 5%. 1000 milliLiter(s) (70 mL/Hr) IV Continuous <Continuous>  heparin   Injectable 5000 Unit(s) SubCutaneous every 8 hours  OLANZapine 5 milliGRAM(s) Oral at bedtime  ondansetron Injectable 4 milliGRAM(s) IV Push once  pantoprazole    Tablet 40 milliGRAM(s) Oral before breakfast    MEDICATIONS  (PRN):  acetaminophen     Tablet .. 650 milliGRAM(s) Oral every 6 hours PRN Temp greater or equal to 38C (100.4F), Mild Pain (1 - 3)  aluminum hydroxide/magnesium hydroxide/simethicone Suspension 30 milliLiter(s) Oral every 4 hours PRN Dyspepsia  melatonin 3 milliGRAM(s) Oral at bedtime PRN Insomnia  polyethylene glycol 3350 17 Gram(s) Oral daily PRN Constipation  traMADol 25 milliGRAM(s) Oral every 6 hours PRN Moderate Pain (4 - 6)      __________________________________________________  REVIEW OF SYSTEMS:    CONSTITUTIONAL: No fever,   EYES: no acute visual disturbances  NECK: No pain or stiffness  RESPIRATORY: No cough; No shortness of breath  CARDIOVASCULAR: No chest pain, no palpitations  GASTROINTESTINAL: No pain. No nausea or vomiting; No diarrhea   NEUROLOGICAL: No headache or numbness, no tremors  MUSCULOSKELETAL: + lower back pain, No joint pain, no muscle pain  GENITOURINARY: no dysuria, no frequency, no hesitancy  PSYCHIATRY: no depression , no anxiety  ALL OTHER  ROS negative        Vital Signs Last 24 Hrs  T(C): 36.2 (23 Aug 2022 05:08), Max: 36.5 (22 Aug 2022 20:54)  T(F): 97.2 (23 Aug 2022 05:08), Max: 97.7 (22 Aug 2022 20:54)  HR: 68 (23 Aug 2022 05:08) (68 - 71)  BP: 103/56 (23 Aug 2022 05:08) (101/51 - 104/32)  BP(mean): --  RR: 18 (23 Aug 2022 05:08) (18 - 20)  SpO2: 100% (23 Aug 2022 05:08) (99% - 100%)    Parameters below as of 23 Aug 2022 05:08  Patient On (Oxygen Delivery Method): room air        ________________________________________________  PHYSICAL EXAM:  GENERAL: NAD  HEENT: Normocephalic;  conjunctivae and sclerae clear; moist mucous membranes;   NECK : supple  CHEST/LUNG: Clear to auscultation bilaterally with good air entry   HEART: S1 S2  regular; no murmurs, gallops or rubs  ABDOMEN: Soft, Nontender, Nondistended; Bowel sounds present  EXTREMITIES: no cyanosis; no edema; no calf tenderness  SKIN: warm and dry; no rash  NERVOUS SYSTEM:  Awake and alert; Oriented  to place, person and time ; no new deficits    _________________________________________________  LABS:                        9.5    9.31  )-----------( 397      ( 23 Aug 2022 06:20 )             30.5     08-23    137  |  106  |  25<H>  ----------------------------<  82  4.5   |  24  |  1.30    Ca    10.5      23 Aug 2022 06:20  Mg     2.6     08-22          CAPILLARY BLOOD GLUCOSE      POCT Blood Glucose.: 100 mg/dL (23 Aug 2022 07:36)  POCT Blood Glucose.: 91 mg/dL (22 Aug 2022 16:46)        RADIOLOGY & ADDITIONAL TESTS:    Imaging Personally Reviewed:  YES    Consultant(s) Notes Reviewed:   YES    Care Discussed with Consultants :     Plan of care was discussed with patient and /or primary care giver; all questions and concerns were addressed and care was aligned with patient's wishes.

## 2022-08-23 NOTE — PROGRESS NOTE ADULT - ASSESSMENT
Patient is a 56y old  Female from home, post menopausal female, with PMH of Anemia, Asthma, Bladder cancer (previously on chemo until 5/2022) with  Dr. Renay Trinidad, R nephrostomy tube, hyperparathyroidism, bradycardia with PPM, now presents to the ER for evaluation of fever and chills at home.  She states that her nephrostomy tubes tends to get clogged often and she has been flushing it constantly.  Also she has been feeling lethargic, having Urinary frequency and abdominal pain. On admission, she has no fever, no Leukocytosis but cloudy urine and positive Urine analysis. She has started on Cefepime and the ID consult requested to assist with further evaluation and antibiotic management.    # Complicated UTI- in the setting of Right nephrostomy tube- Urine cx has no growth-  The right nephrostomy tube is not draining well, patient has to drain manually. She is s/p Right NT changed on 8/22/22, by IR.  # Bilateral hydronephrosis, mild on the right and moderate to severe on the left on Renal Us from 8/18/22  # Bladder cancer     would recommend:    1. Please optimize pain management  2. Monitor kidney function and adjust Abx doses accordingly  3. Continue Cefepime until 8/23/22  4. Bowel regimen as needed  5. OOB to chair     d/w patient, covering NP, Sarah and patient     Attending Attestation:    Spent more than 35 minutes on total encounter, more than 50 % of the visit was spent counseling and/or coordinating care by the Attending physician.

## 2022-08-24 NOTE — PROGRESS NOTE ADULT - PROBLEM SELECTOR PLAN 1
p/w dysuria, frequency  UA positive  Urine culture negative 8/15  rt nephrostomy culture 8/16 negative  bcx 8/15 negative  c/w- Cefepime   ID: Dr. Rodriguez. - UA positive  - Urine cx & Blood cx NGTD   - R nephrostomy culture 8/16 negative  - s/p Cefepime completed 08/23, monitor off abx  - ID Dr. Rodriguez following

## 2022-08-24 NOTE — PROGRESS NOTE ADULT - ASSESSMENT
Patient is a 56y old  Female from home, post menopausal female, with PMH of Anemia, Asthma, Bladder cancer (previously on chemo until 5/2022) with  Dr. Renay Trinidad, R nephrostomy tube, hyperparathyroidism, bradycardia with PPM, now presents to the ER for evaluation of fever and chills at home.  She states that her nephrostomy tubes tends to get clogged often and she has been flushing it constantly.  Also she has been feeling lethargic, having Urinary frequency and abdominal pain. On admission, she has no fever, no Leukocytosis but cloudy urine and positive Urine analysis. She has started on Cefepime and the ID consult requested to assist with further evaluation and antibiotic management.    # Complicated UTI- in the setting of Right nephrostomy tube- Urine cx has no growth-  The right nephrostomy tube is not draining well, patient has to drain manually. She is s/p Right NT changed on 8/22/22, by IR.  # Bilateral hydronephrosis, mild on the right and moderate to severe on the left on Renal Us from 8/18/22  # Bladder cancer     would recommend:    1. Continue pain management as needed  2. Monitor kidney function and IVF  3. Monitor OFF Abx as she completed the courase  4. Bowel regimen as needed  5. OOB to chair     Attending Attestation:    Spent more than 35 minutes on total encounter, more than 50 % of the visit was spent counseling and/or coordinating care by the Attending physician.

## 2022-08-24 NOTE — CONSULT NOTE ADULT - PROBLEM SELECTOR RECOMMENDATION 9
Pt with chronic abdominal and b/l flank pain which is somatic and neuropathic in nature due to metastatic bladder CA and right nephrostomy tube. US renal demonstrates- Bilateral hydronephrosis, mild on the right and moderate to severe on the left. A right nephrostomy tube is present. 4 mm nonobstructive calculus in the right kidney. 2. Complex cysts with the solid components or debris in the left kidney.   Opioid pain recommendations   - Continue Tramadol 50mg PO q 6 hours PRN moderate pain. Monitor for sedation/ respiratory depression.   Non-opioid pain recommendations   - Continue Acetaminophen 650mg PO q 6 hours PRN mild pain. Monitor LFTs  Bowel Regimen  - Continue Miralax 17G PO daily PRN constipation   - Senna 2 tablets at bedtime for constipation  Mild pain   - Non-pharmacological pain treatment recommendations  - Warm/ Cool packs PRN   - Repositioning, imagery, relaxation, distraction.  - Physical therapy OOB if no contraindications   Recommendations discussed with primary team and RN

## 2022-08-24 NOTE — CONSULT NOTE ADULT - SUBJECTIVE AND OBJECTIVE BOX
Source of information: [facility], Chart review  Patient language: English  : n/a    HPI:  This is a 56 year old female from home, post menopausal female, with PMH of Anemia, Asthma, Bladder cancer (previously on chemo until 5/2022) with  Dr. Renay Trinidad, R nephrostomy tube, hyperparathyroidism, bradycardia with PPM coming in for fevers and chills at home.   She states that her nephrostomy tubes tends to get clogged often and she has been flushing it constantly. Since yesterday she has been feeling lethargic, with chills and abdominal pain. She states in the past similar symptoms occurred when she was found to have UTI.   She denies any headaches, visual disturbances, dizziness, falls, chest pain, palpitations, lower extremity swelling, fevers, skin rash, recent travel, or sick contacts   (16 Aug 2022 02:35)    US renal demonstrates- Bilateral hydronephrosis, mild on the right and moderate to severe on the   left. A right nephrostomy tube is present. 4 mm nonobstructive calculus in the right kidney. 2. Complex cysts with the solid components or debris in the left kidney.   Dx with UTI and CRYSTAL. Pt with hx metastatic bladder CA and right nephrostomy tube. Pain consulted for flank pain. Followed pt during prior admission in 8/2022. Pt seen and examined at bedside. Pt sitting in bed, reports b/l flank pain radiating to abdomen. Rating pain score 8/10 SCALE USED: (1-10 VNRS). Pt describes pain as constant, aching, alleviated by current pain medication (tramadol increased to 50mg today), exacerbated by movement. States she does not want to take oxycodone or morphine at this time. Pt tolerating PO diet. Denies lethargy, chest pain, SOB, vomiting. Reports intermittent nausea when she takes pain medication. States she wants to eat first and then will ask for PRN tramadol. Also reports constipation, last BM 8/21, requesting senna tonight. Pt reports blood upon urination. No blood noted in nephrostomy bag at this time. Patient stated goal for pain control: to be able to take deep breaths, get out of bed to chair and ambulate with tolerable pain control. Questions and concerns addressed. Plan to be discharged tomorrow.     PAST MEDICAL & SURGICAL HISTORY:  Anemia      Asthma      HLD (hyperlipidemia)      Pacemaker  [employer]      Bladder cancer      HTN (hypertension)      Parathyroid tumor      Liver cyst      Hydronephrosis  has right Nephrostomy tube - dressing intact      Bradycardia      History of renal calculi      H/O myomectomy      Presence of cardiac pacemaker      H/O hydronephrosis  s/p Right Perc nephrostomy tube placement 02/2021, exchange 06/2021          FAMILY HISTORY:  Family history of prostate cancer (Father)    Family history of CHF (congestive heart failure) (Father)    Family history of atrial fibrillation (Father)        Social History:  pt lives at home, denies any history of alcohol, smoking or drug use. (16 Aug 2022 02:35)   [X ] Denies ETOH use, illicit drug use and smoking    Allergies    No Known Allergies    MEDICATIONS  (STANDING):  atorvastatin 40 milliGRAM(s) Oral at bedtime  budesonide  80 MICROgram(s)/formoterol 4.5 MICROgram(s) Inhaler 2 Puff(s) Inhalation two times a day  dextrose 5%. 1000 milliLiter(s) (70 mL/Hr) IV Continuous <Continuous>  heparin   Injectable 5000 Unit(s) SubCutaneous every 8 hours  OLANZapine 5 milliGRAM(s) Oral at bedtime  ondansetron Injectable 4 milliGRAM(s) IV Push once  pantoprazole    Tablet 40 milliGRAM(s) Oral before breakfast    MEDICATIONS  (PRN):  acetaminophen     Tablet .. 650 milliGRAM(s) Oral every 6 hours PRN Temp greater or equal to 38C (100.4F), Mild Pain (1 - 3)  aluminum hydroxide/magnesium hydroxide/simethicone Suspension 30 milliLiter(s) Oral every 4 hours PRN Dyspepsia  diazepam    Tablet 5 milliGRAM(s) Oral three times a day PRN back spasm  melatonin 3 milliGRAM(s) Oral at bedtime PRN Insomnia  polyethylene glycol 3350 17 Gram(s) Oral daily PRN Constipation  traMADol 50 milliGRAM(s) Oral every 6 hours PRN Moderate Pain (4 - 6)      Vital Signs Last 24 Hrs  T(C): 36.6 (24 Aug 2022 12:08), Max: 36.6 (24 Aug 2022 12:08)  T(F): 97.9 (24 Aug 2022 12:08), Max: 97.9 (24 Aug 2022 12:08)  HR: 69 (24 Aug 2022 12:08) (65 - 69)  BP: 107/59 (24 Aug 2022 12:08) (92/55 - 107/59)  BP(mean): --  RR: 20 (24 Aug 2022 12:08) (18 - 20)  SpO2: 100% (24 Aug 2022 12:08) (97% - 100%)    Parameters below as of 24 Aug 2022 12:08  Patient On (Oxygen Delivery Method): room air        LABS: Reviewed.                          9.1    9.05  )-----------( 320      ( 24 Aug 2022 07:41 )             29.5     08-24    138  |  106  |  22<H>  ----------------------------<  80  4.4   |  24  |  1.11    Ca    10.0      24 Aug 2022 07:41            CAPILLARY BLOOD GLUCOSE      POCT Blood Glucose.: 88 mg/dL (24 Aug 2022 16:54)  POCT Blood Glucose.: 97 mg/dL (24 Aug 2022 11:50)  POCT Blood Glucose.: 84 mg/dL (24 Aug 2022 07:48)  POCT Blood Glucose.: 72 mg/dL (24 Aug 2022 00:04)  POCT Blood Glucose.: 105 mg/dL (23 Aug 2022 22:07)    COVID-19 PCR: NotDetec (22 Aug 2022 05:36)  COVID-19 PCR: NotDetec (15 Aug 2022 19:55)  SARS-CoV-2: NotDetec (31 Jul 2022 10:35)  COVID-19 PCR: NotDetec (18 Jul 2022 05:09)  COVID-19 PCR: NotDetec (04 Jul 2022 06:00)  COVID-19 PCR: NotDetec (27 Jun 2022 18:11)  SARS-CoV-2: NotDetec (09 Jun 2022 20:56)  COVID-19 PCR: NotDetec (04 May 2022 11:56)  COVID-19 PCR: NotDetec (23 Mar 2022 14:48)  COVID-19 PCR: NotDetec (08 Mar 2022 19:08)  COVID-19 PCR: NotDetec (02 Mar 2022 15:54)      Radiology: Reviewed.   < from: US Renal (08.18.22 @ 19:21) >    ACC: [id number] EXAM:  US KIDNEY(S)                          PROCEDURE DATE:  08/18/2022          INTERPRETATION:  CLINICAL INFORMATION: Acute kidney injury    COMPARISON: No prior renal ultrasounds available for comparison.    TECHNIQUE: Sonography of the kidneys and bladder.    FINDINGS:  Right kidney: 10.1 cm. Mild right hydronephrosis. 4 mm nonobstructive   calculus in the lower pole of the right kidney. No right renal mass is   identified. A right nephrostomy tube is present.    Left kidney:12.7 cm. 2 lower pole complex left renal cysts measuring 3.5   x 4.0 x 4.3 cm and 3.7 x 3.9 x 2.7 cm with solid components or debris.   Moderate to severe hydronephrosis. No evidence for a calculus in the left   kidney.    Urinary bladder: Collapsed    IMPRESSION:  Bilateral hydronephrosis, mild on the right and moderate to severe on the   left.    A right nephrostomy tube is present.    4 mm nonobstructive calculus in the right kidney.    2. Complex cysts with the solid components or debris in the left kidney.   If clinically indicated, abdominal MR without and with IV contrast may be   pursued for further evaluation.    --- End of Report ---            MORRIS HIGGINS MD; Attending Radiologist  This document has been electronically signed. Aug 18 2022  8:48PM    < end of copied text >      ORT Score -   Family Hx of substance abuse	Female	      Male  Alcohol 	                                           1                     3  Illegal drugs	                                   2                     3  Rx drugs                                           4 	                  4  Personal Hx of substance abuse		  Alcohol 	                                          3	                  3  Illegal drugs                                     4	                  4  Rx drugs                                            5 	                  5  Age between 16- 45 years	           1                     1  hx preadolescent sexual abuse	   3 	                  0  Psychological disease		  ADD, OCD, bipolar, schizophrenia   2	          2  Depression                                           1 	          1  Total: 0    a score of 3 or lower indicates low risk for opioid abuse		  a score of 4-7 indicates moderate risk for opioid abuse		  a score of 8 or higher indicates high risk for opioid abuse  	  REVIEW OF SYSTEMS:  CONSTITUTIONAL: No fever + fatigue  RESPIRATORY: No cough, wheezing, chills or hemoptysis; No shortness of breath  CARDIOVASCULAR: No chest pain, palpitations, dizziness, or leg swelling  GASTROINTESTINAL: No loss of appetite, decreased PO intake. + abdominal pain. + occasional nausea, no vomiting; No diarrhea or constipation.   GENITOURINARY: + b/l flank pain; + mild hematuria x 2 days upon urinartion; + right nephrostomy tube; No dysuria, frequency, retention or incontinence  MUSCULOSKELETAL: No joint pain or swelling; No muscle, back, or extremity pain, no upper or lower motor strength weakness, no saddle anesthesia, bowel/bladder incontinence, no falls   NEURO: No headaches, No numbness/tingling b/l LE, No weakness    PHYSICAL EXAM:  GENERAL:  Alert & Oriented X4, cooperative, NAD, Good concentration. Speech is clear. + anxious   RESPIRATORY: Respirations even and unlabored. Clear to auscultation bilaterally; No rales, rhonchi, wheezing, or rubs  CARDIOVASCULAR: Normal S1/S2, regular rate and rhythm; No murmurs, rubs, or gallops. No JVD.   GASTROINTESTINAL:  Soft, + lower abdominal tenderness, Nondistended; Bowel sounds present  GENITOURINARY: + right flank nephrostomy tube draining clear yellow urine; + right flank dressing c/d/i;   PERIPHERAL VASCULAR:  Extremities warm without edema. 2+ Peripheral Pulses, No cyanosis, No calf tenderness  MUSCULOSKELETAL: Motor Strength 5/5 B/L upper and lower extremities; moves all extremities equally against gravity; ROM intact; negative SLR; + b/l flank tenderness   SKIN: Warm, dry, intact. No rashes, lesions, scars or wounds.     Risk factors associated with adverse outcomes related to opioid treatment  [ ]  Concurrent benzodiazepine use  [ ]  History/ Active substance use or alcohol use disorder  [ ] Psychiatric co-morbidity  [ ] Sleep apnea  [ ] COPD  [ ] BMI> 35  [ ] Liver dysfunction  [ ] Renal dysfunction  [ ] CHF  [ ] Smoker  [ ]  Age > 60 years    [X ]  [facility]  Reviewed and Copied to Chart. See below.    Plan of care and goal oriented pain management treatment options were discussed with patient and /or primary care giver; all questions and concerns were addressed and care was aligned with patient's wishes.    Educated patient on goal oriented pain management treatment options

## 2022-08-24 NOTE — PROGRESS NOTE ADULT - PROBLEM SELECTOR PLAN 2
p/w Scr 1.41  s/p IVF blous  Scr 1.40  Scr improving slowly 1.35 today  renal US- b/l hydronephrosis  avoid nephrotoxins  Monitor BMP  Nephro following- Dr. Murcia - p/w Scr 1.41  s/p IVF blous  - sCR improved and stable  - renal US shows b/l hydronephrosis  - avoid nephrotoxins  - trend BMP  - Nephro Dr. Murcia following

## 2022-08-24 NOTE — PROGRESS NOTE ADULT - SUBJECTIVE AND OBJECTIVE BOX
Patient is seen and examined at the bed side, is afebrile. The pain is controlled better now.      REVIEW OF SYSTEMS: All other review systems are negative      ALLERGIES: No Known Allergies      Vital Signs Last 24 Hrs  T(C): 36.6 (24 Aug 2022 12:08), Max: 36.6 (24 Aug 2022 12:08)  T(F): 97.9 (24 Aug 2022 12:08), Max: 97.9 (24 Aug 2022 12:08)  HR: 69 (24 Aug 2022 12:08) (65 - 69)  BP: 107/59 (24 Aug 2022 12:08) (92/55 - 107/59)  BP(mean): --  RR: 20 (24 Aug 2022 12:08) (18 - 20)  SpO2: 100% (24 Aug 2022 12:08) (97% - 100%)    Parameters below as of 24 Aug 2022 12:08  Patient On (Oxygen Delivery Method): room air      PHYSICAL EXAM:  GENERAL: Not in distress   CHEST/LUNG:  Not using accessory muscles   HEART: s1 and s2 present  ABDOMEN:  Nontender and  Nondistended  : new Right nephrostomy tubae in placed   EXTREMITIES: No pedal  edema  CNS: Awake and Alert      LABS:                        9.1    9.05  )-----------( 320      ( 24 Aug 2022 07:41 )             29.5                           9.5    9.31  )-----------( 397      ( 23 Aug 2022 06:20 )             30.5       08-24    138  |  106  |  22<H>  ----------------------------<  80  4.4   |  24  |  1.11    Ca    10.0      24 Aug 2022 07:41        08-23    137  |  106  |  25<H>  ----------------------------<  82  4.5   |  24  |  1.30    Ca    10.5      23 Aug 2022 06:20  Mg     2.6     08-22  TPro  x   /  Alb  2.6<L>  /  TBili  x   /  DBili  x   /  AST  x   /  ALT  x   /  AlkPhos  x   08-20      Urinalysis Basic - ( 16 Aug 2022 21:40 )  Color: Yellow / Appearance: Slightly Turbid / S.015 / pH: x  Gluc: x / Ketone: Negative  / Bili: Negative / Urobili: Negative   Blood: x / Protein: 500 mg/dL / Nitrite: Negative   Leuk Esterase: Moderate / RBC: 10-25 /HPF / WBC 26-50 /HPF   Sq Epi: x / Non Sq Epi: Few /HPF / Bacteria: Moderate /HPF        MEDICATIONS  (STANDING):    atorvastatin 40 milliGRAM(s) Oral at bedtime  budesonide  80 MICROgram(s)/formoterol 4.5 MICROgram(s) Inhaler 2 Puff(s) Inhalation two times a day  dextrose 5%. 1000 milliLiter(s) (70 mL/Hr) IV Continuous <Continuous>  heparin   Injectable 5000 Unit(s) SubCutaneous every 8 hours  OLANZapine 5 milliGRAM(s) Oral at bedtime  ondansetron Injectable 4 milliGRAM(s) IV Push once  pantoprazole    Tablet 40 milliGRAM(s) Oral before breakfast      RADIOLOGY & ADDITIONAL TESTS:    22: US Renal (22 @ 19:21) >Bilateral hydronephrosis, mild on the right and moderate to severe on the   left.  A right nephrostomy tube is present.  4 mm nonobstructive calculus in the right kidney.    2. Complex cysts with the solid components or debris in the left kidney. If clinically indicated, abdominal MR without and with IV contrast may be   pursued for further evaluation.    22: Xray Chest 1 View- PORTABLE-Urgent (22 @ 01:40) Redemonstration of bilateral metastatic lung nodules.      MICROBIOLOGY DATA:  Culture - Urine (08.15.22 @ 19:55)   Specimen Source: Clean Catch Clean Catch (Midstream)   Culture Results: <10,000 CFU/mL Normal Urogenital Vandana     COVID-19 PCR (08.15.22 @ 19:55)   COVID-19 PCR: NotDetec:     Culture - Blood (08.15.22 @ 19:50)   Specimen Source: .Blood Blood-Peripheral   Culture Results: No growth to date.     Culture - Blood (08.15.22 @ 19:45)   Specimen Source: .Blood Blood-Peripheral   Culture Results: No growth to date.

## 2022-08-24 NOTE — PROGRESS NOTE ADULT - SUBJECTIVE AND OBJECTIVE BOX
NP Note discussed with  Primary Attending    Patient is a 56y old  Female who presents with a chief complaint of UTI (24 Aug 2022 15:53)      INTERVAL HPI/OVERNIGHT EVENTS: continued back pain    MEDICATIONS  (STANDING):  atorvastatin 40 milliGRAM(s) Oral at bedtime  budesonide  80 MICROgram(s)/formoterol 4.5 MICROgram(s) Inhaler 2 Puff(s) Inhalation two times a day  dextrose 5%. 1000 milliLiter(s) (70 mL/Hr) IV Continuous <Continuous>  heparin   Injectable 5000 Unit(s) SubCutaneous every 8 hours  OLANZapine 5 milliGRAM(s) Oral at bedtime  ondansetron Injectable 4 milliGRAM(s) IV Push once  pantoprazole    Tablet 40 milliGRAM(s) Oral before breakfast  senna 2 Tablet(s) Oral at bedtime    MEDICATIONS  (PRN):  acetaminophen     Tablet .. 650 milliGRAM(s) Oral every 6 hours PRN Temp greater or equal to 38C (100.4F), Mild Pain (1 - 3)  aluminum hydroxide/magnesium hydroxide/simethicone Suspension 30 milliLiter(s) Oral every 4 hours PRN Dyspepsia  diazepam    Tablet 5 milliGRAM(s) Oral three times a day PRN back spasm  melatonin 3 milliGRAM(s) Oral at bedtime PRN Insomnia  polyethylene glycol 3350 17 Gram(s) Oral daily PRN Constipation  traMADol 50 milliGRAM(s) Oral every 6 hours PRN Moderate Pain (4 - 6)      __________________________________________________  REVIEW OF SYSTEMS:    CONSTITUTIONAL: No fever,   EYES: no acute visual disturbances  NECK: No pain or stiffness  RESPIRATORY: No cough; No shortness of breath  CARDIOVASCULAR: No chest pain, no palpitations  GASTROINTESTINAL: No pain. No nausea or vomiting; No diarrhea   NEUROLOGICAL: No headache or numbness, no tremors  MUSCULOSKELETAL: + back pain  GENITOURINARY: no dysuria, no frequency, no hesitancy  PSYCHIATRY: no depression , no anxiety  ALL OTHER  ROS negative        Vital Signs Last 24 Hrs  T(C): 36.6 (24 Aug 2022 12:08), Max: 36.6 (24 Aug 2022 12:08)  T(F): 97.9 (24 Aug 2022 12:08), Max: 97.9 (24 Aug 2022 12:08)  HR: 69 (24 Aug 2022 12:08) (65 - 69)  BP: 107/59 (24 Aug 2022 12:08) (92/55 - 107/59)  BP(mean): --  RR: 20 (24 Aug 2022 12:08) (18 - 20)  SpO2: 100% (24 Aug 2022 12:08) (97% - 100%)    Parameters below as of 24 Aug 2022 12:08  Patient On (Oxygen Delivery Method): room air        ________________________________________________  PHYSICAL EXAM:  GENERAL: NAD  HEENT: Normocephalic;  conjunctivae and sclerae clear  NECK : supple  CHEST/LUNG: Clear to auscultation bilaterally   HEART: S1 S2  regular; no murmurs  ABDOMEN: Soft, Nontender, Nondistended  EXTREMITIES: no cyanosis; no edema; no calf tenderness  SKIN: warm and dry; no rash  NERVOUS SYSTEM:  Awake and alert; Oriented  to place, person and time    _________________________________________________  LABS:                        9.1    9.05  )-----------( 320      ( 24 Aug 2022 07:41 )             29.5     08-24    138  |  106  |  22<H>  ----------------------------<  80  4.4   |  24  |  1.11    Ca    10.0      24 Aug 2022 07:41          CAPILLARY BLOOD GLUCOSE      POCT Blood Glucose.: 88 mg/dL (24 Aug 2022 16:54)  POCT Blood Glucose.: 97 mg/dL (24 Aug 2022 11:50)  POCT Blood Glucose.: 84 mg/dL (24 Aug 2022 07:48)  POCT Blood Glucose.: 72 mg/dL (24 Aug 2022 00:04)  POCT Blood Glucose.: 105 mg/dL (23 Aug 2022 22:07)        RADIOLOGY & ADDITIONAL TESTS:  < from: Xray Chest 1 View- PORTABLE-Urgent (08.16.22 @ 01:40) >  Impression:  Redemonstration of bilateral metastatic lung nodules.    < end of copied text >    < from: US Renal (08.18.22 @ 19:21) >  IMPRESSION:  Bilateral hydronephrosis, mild on the right and moderate to severe on the   left.    A right nephrostomy tube is present.    4 mm nonobstructive calculus in the right kidney.    2. Complex cysts with the solid components or debris in the left kidney.   If clinically indicated, abdominal MR without and with IV contrast may be   pursued for further evaluation.    < end of copied text >    Imaging Personally Reviewed:  YES    Consultant(s) Notes Reviewed:   YES    Care Discussed with Consultants : Pain Management    Plan of care was discussed with patient and /or primary care giver; all questions and concerns were addressed and care was aligned with patient's wishes.

## 2022-08-24 NOTE — PROGRESS NOTE ADULT - ASSESSMENT
_________________________________________________________________________________________  ========>>  M E D I C A L   A T T E N D I N G    F O L L O W  U P  N O T E  <<=========  -----------------------------------------------------------------------------------------------------    - Patient seen and examined by me approximately sixty minutes ago.  - In summary,  JEF HOUSE is a 56y year old woman admitted with pain in back., possible UTI   - Patient still in great amount of pain, states tramadol 50 mg worked better than 25 ( also received valium for possible spasm which helped)     ==================>> REVIEW OF SYSTEM <<=================    GEN: no fever, no chills, as above , +  pain as above   RESP: no SOB, no cough, no sputum  CVS: no chest pain, no palpitations, no edema  GI: no abdominal pain, no nausea,  : no dysuria, no frequency, no hematuria.. nephrostomy seems to be draining well   Neuro: no headache, no dizziness  Derm : no itching, no rash    ==================>> PHYSICAL EXAM <<=================    GEN: A&O X 3 , NAD , uncomfortable, calm but tearful at times cachectic   HEENT: NCAT, PERRL, MMM, hearing intact, temporal wasting   Neck: supple , no JVD appreciated  CVS: S1S2 , regular , No M/R/G appreciated  PULM: CTA B/L,  no W/R/R appreciated  ABD.: soft. non tender, non distended,  bowel sounds present  Extrem: intact pulses , no edema   PSYCH : normal mood,  not anxious, as above                              ( Note written / Date of service 08-24-22 )    ==================>> MEDICATIONS <<====================    atorvastatin 40 milliGRAM(s) Oral at bedtime  budesonide  80 MICROgram(s)/formoterol 4.5 MICROgram(s) Inhaler 2 Puff(s) Inhalation two times a day  dextrose 5%. 1000 milliLiter(s) IV Continuous <Continuous>  heparin   Injectable 5000 Unit(s) SubCutaneous every 8 hours  OLANZapine 5 milliGRAM(s) Oral at bedtime  ondansetron Injectable 4 milliGRAM(s) IV Push once  pantoprazole    Tablet 40 milliGRAM(s) Oral before breakfast    MEDICATIONS  (PRN):  acetaminophen     Tablet .. 650 milliGRAM(s) Oral every 6 hours PRN Temp greater or equal to 38C (100.4F), Mild Pain (1 - 3)  aluminum hydroxide/magnesium hydroxide/simethicone Suspension 30 milliLiter(s) Oral every 4 hours PRN Dyspepsia  diazepam    Tablet 5 milliGRAM(s) Oral three times a day PRN back spasm  melatonin 3 milliGRAM(s) Oral at bedtime PRN Insomnia  polyethylene glycol 3350 17 Gram(s) Oral daily PRN Constipation  traMADol 50 milliGRAM(s) Oral every 6 hours PRN Moderate Pain (4 - 6)    ___________  Active diet:  Diet, Regular  ___________________    ==================>> VITAL SIGNS <<==================    Vital Signs Last 24 HrsT(C): 36.6 (08-24-22 @ 12:08)  T(F): 97.9 (08-24-22 @ 12:08), Max: 97.9 (08-24-22 @ 12:08)  HR: 69 (08-24-22 @ 12:08) (65 - 69)  BP: 107/59 (08-24-22 @ 12:08)  RR: 20 (08-24-22 @ 12:08) (18 - 20)  SpO2: 100% (08-24-22 @ 12:08) (97% - 100%)      CAPILLARY BLOOD GLUCOSE  POCT Blood Glucose.: 97 mg/dL (24 Aug 2022 11:50)  POCT Blood Glucose.: 84 mg/dL (24 Aug 2022 07:48)  POCT Blood Glucose.: 72 mg/dL (24 Aug 2022 00:04)  POCT Blood Glucose.: 105 mg/dL (23 Aug 2022 22:07)  POCT Blood Glucose.: 75 mg/dL (23 Aug 2022 16:43)     ==================>> LAB AND IMAGING <<==================                        9.1    9.05  )-----------( 320      ( 24 Aug 2022 07:41 )             29.5        08-24    138  |  106  |  22<H>  ----------------------------<  80  4.4   |  24  |  1.11    Ca    10.0      24 Aug 2022 07:41      WBC count:   9.05 <<== ,  9.31 <<== ,  8.03 <<== ,  8.07 <<== ,  7.50 <<==   Hemoglobin:   9.1 <<==,  9.5 <<==,  9.2 <<==,  9.4 <<==,  9.1 <<==  platelets:  320 <==, 397 <==, 375 <==, 383 <==, 390 <==, 370 <==    Creatinine:  1.11  <<==, 1.30  <<==, 1.35  <<==, 1.40  <<==, 1.33  <<==, 1.49  <<==  Sodium:   138  <==, 137  <==, 141  <==, 136  <==, 137  <==, 140  <==, 161  <==      ___________________________    M I C R O B I O L O G Y :    Culture - Urine (collected 16 Aug 2022 21:40)  Source: .Urine Nephrostomy - Right  Final Report (18 Aug 2022 09:15):    No growth    Culture - Urine (collected 15 Aug 2022 19:55)  Source: Clean Catch Clean Catch (Midstream)  Final Report (16 Aug 2022 18:02):    <10,000 CFU/mL Normal Urogenital Vandana    Culture - Blood (collected 15 Aug 2022 19:50)  Source: .Blood Blood-Peripheral  Final Report (20 Aug 2022 22:00):    No Growth Final    Culture - Blood (collected 15 Aug 2022 19:45)  Source: .Blood Blood-Peripheral  Final Report (20 Aug 2022 22:00):    No Growth Final        COVID-19 PCR: NotDetec (08-22-22 @ 05:36)  COVID-19 PCR: NotDetec (08-15-22 @ 19:55)    ___________________________________________________________________________________  ===============>>  A S S E S S M E N T   A N D   P L A N <<===============  ------------------------------------------------------------------------------------------    · Assessment	  This is a 56 year old female from home, post menopausal female, with PMH of Anemia, Asthma, Bladder cancer (previously on chemo until 5/2022), R nephrostomy tube, hyperparathyroidism, bradycardia with PPM coming in UTI.     Problem/Plan - 1:  ·  Problem:  complicated UTI in pt with cancer and nephrostomy tube   finished ABx as per ID   nephrostomy change done     ** gradually worsening renal function / creatinine and chronic proteinuria , now also with hypernatremia        CRYSTAL on CKD II      nephrology following, appreciated      monitor BMP     encouraged Po hydration      prteinurea : unclear etiology >> monitor for now    Problem/Plan - 2:  ·  Problem: Asthma.   ·  Plan: - Pt has a history of Asthma.   - Will continue pts home medication Symbicort.    Problem/Plan - 3:  ·  Problem: Bladder cancer.   ·  Plan: - Pt has a history of bladder cancer with mets to the liver.   - follows  Dr. Renay Trinidad,  - pt has a PET scan scheduled later this month to monitor progress..     >> pain likely related to cancer and not kidney infection as pt thinks         unfortunately pt declining to have further CT scans, declining to take narcotics for pain mgmt, now ok with seeing pain mgmt              continue tramadol 50mg for now and valium 5 mg PRN muscle spasm     Problem/Plan - 4:  ·  Problem: HLD (hyperlipidemia).   ·  Plan: - Pt has a history of HLD.   - Will continue pts home medication Atorvastatin.    Problem/Plan - 5:  ·  Problem: HTN (hypertension).   ·  Plan: - Pt has a history of HTN.   - Currently not on any medication.   - Continue to monitor.    Problem/Plan - 6:  ·  Problem: Prophylactic measure.   ·  Plan: - Heparin - DVT.  encouraged PO intake     dispo planing home when more stable / pain better managed..     --------------------------------------------  Case discussed with pt, NP  ___________________________  HLeslee Ochoa D.O.  Pager: [phone]

## 2022-08-24 NOTE — PROGRESS NOTE ADULT - PROBLEM SELECTOR PLAN 6
Pt has a history of bladder cancer with mets to the liver.   - follows  Dr. Renay Trinidad, - Pt has a history of bladder cancer with mets to the liver  - follows  Dr. Renay Trinidad

## 2022-08-24 NOTE — CONSULT NOTE ADULT - REASON FOR ADMISSION
December 18, 2017      Rosa Maria Shay, LATONYA  [address] [place] [address]           [facility]  [address]  [place] [address]  Phone: [phone]  Fax: [phone]          Patient: Reece Aguillon   MR Number: [id number]   YOB: 1979   Date of Visit: 12/18/2017       Dear Rosa Maria Shay:    Thank you for referring Reece Aguillon to me for evaluation. Attached you will find relevant portions of my assessment and plan of care.    If you have questions, please do not hesitate to call me. I look forward to following Reece Aguillon along with you.    Sincerely,    Pillo Murguia MD    Enclosure  CC:  No Recipients    If you would like to receive this communication electronically, please contact [email or web address] or [phone] to request more information on [employer] Link access.    For providers and/or their staff who would like to refer a patient to [facility], please contact us through our one-stop-shop provider referral line, [facility], at [phone].    If you feel you have received this communication in error or would no longer like to receive these types of communications, please e-mail [email or web address]         
UTI

## 2022-08-24 NOTE — PROGRESS NOTE ADULT - PROBLEM SELECTOR PLAN 9
S/P nephrostomy tube change in IR ( monday 8/22/22)  Discharge planning is for tomorrow 8/24  pending pain improvement  As per ID Dr. Rodriguez, pt will NOT require oral antibiotics on discharge - S/P nephrostomy tube change in IR ( monday 8/22/22)  - pending pain management consult

## 2022-08-24 NOTE — PROGRESS NOTE ADULT - PROBLEM SELECTOR PLAN 4
no exacerbation  on home med Symbicort  c/w home med - not in exacerbation  - saturating well on room air  - continue home dose symbicort

## 2022-08-24 NOTE — PROGRESS NOTE ADULT - ASSESSMENT
56 year old female from home with pmhx of Anemia, Asthma, Bladder cancer (previously on chemo until 5/2022) with Dr. Renay Trinidad, R nephrostomy tube, hyperparathyroidism, bradycardia with PPM coming in for fevers and chills at home. Admitted for UTI. UA positive started on Abx, cefepime. Dr Jennifer CALIX consulted. renal US- b/l hydronephrosis, rt kidney stone and nephrostomy tube and left cyst. Ir consulted for nephrostomy tube- plan to change tube 8/22/22 under anesthesia. Pt. also noted to have CRYSTAL, s/p IVF bolus, worsening kidney fnx- Dr. Murcia consulted- nephro.  Patient is s/p IR guided right nephrostomy tube exchange. Continue cefepime. Scr slowly improving. Plan is to monitor pt for 24 hours post nephrostomy exchange and d/c home tomorrow 8/23 if medically stable.  Patient complaining of lower back pain, pt is afebrile. Toradol ordered. Continue antibiotics as per ID until 8/23. Will monitor patient's pain. Discharge planning for AM 8/24 56 year old female from home with pmhx of Anemia, Asthma, Bladder cancer (previously on chemo until 5/2022) with Dr. Renay Trinidad, R nephrostomy tube, hyperparathyroidism, bradycardia with PPM coming in for fevers, chills, abd pain at home. Admitted for acute complicated UTI,  UA positive started on Cefepime. Dr Jennifer CALIX consulted. Renal US shows b/l hydronephrosis, rt kidney stone and nephrostomy tube and left cyst. IR consulted for nephrostomy tube exchanged, also noted to have CRYSTAL, s/p IVF bolus, worsening kidney fx, Nephro Dr. Murcia consulted. Patient s/p IR guided right nephrostomy tube exchange, continue cefepime until 8/23 per ID. Scr slowly improving. Hospital course c/b persistent lower back pain, Toradol given with minimal improvement. Pain management consulted. Possible d/c in AM.

## 2022-08-25 NOTE — PROGRESS NOTE ADULT - SUBJECTIVE AND OBJECTIVE BOX
Patient is seen and examined at the bed side, is afebrile. She is c/o feeling weak.       REVIEW OF SYSTEMS: All other review systems are negative      ALLERGIES: No Known Allergies      Vital Signs Last 24 Hrs  T(C): 36.6 (25 Aug 2022 14:17), Max: 36.7 (25 Aug 2022 04:37)  T(F): 97.8 (25 Aug 2022 14:17), Max: 98 (25 Aug 2022 04:37)  HR: 66 (25 Aug 2022 14:17) (63 - 68)  BP: 105/56 (25 Aug 2022 14:17) (97/59 - 108/56)  BP(mean): --  RR: 16 (25 Aug 2022 14:17) (16 - 16)  SpO2: 100% (25 Aug 2022 14:17) (100% - 100%)    Parameters below as of 25 Aug 2022 14:17  Patient On (Oxygen Delivery Method): room air        PHYSICAL EXAM:  GENERAL: Not in distress   CHEST/LUNG:  Not using accessory muscles   HEART: s1 and s2 present  ABDOMEN:  Nontender and  Nondistended  : new Right nephrostomy tubae in placed   EXTREMITIES: No pedal  edema  CNS: Awake and Alert      LABS:                        9.8    9.02  )-----------( 369      ( 25 Aug 2022 06:25 )             32.6                           9.1    9.05  )-----------( 320      ( 24 Aug 2022 07:41 )             29.5       08-25    137  |  104  |  21<H>  ----------------------------<  78  4.2   |  27  |  1.04    Ca    10.4      25 Aug 2022 06:25    08-23    137  |  106  |  25<H>  ----------------------------<  82  4.5   |  24  |  1.30    Ca    10.5      23 Aug 2022 06:20  Mg     2.6     08-22  TPro  x   /  Alb  2.6<L>  /  TBili  x   /  DBili  x   /  AST  x   /  ALT  x   /  AlkPhos  x   08-20      Urinalysis Basic - ( 16 Aug 2022 21:40 )  Color: Yellow / Appearance: Slightly Turbid / S.015 / pH: x  Gluc: x / Ketone: Negative  / Bili: Negative / Urobili: Negative   Blood: x / Protein: 500 mg/dL / Nitrite: Negative   Leuk Esterase: Moderate / RBC: 10-25 /HPF / WBC 26-50 /HPF   Sq Epi: x / Non Sq Epi: Few /HPF / Bacteria: Moderate /HPF        MEDICATIONS  (STANDING):    atorvastatin 40 milliGRAM(s) Oral at bedtime  budesonide  80 MICROgram(s)/formoterol 4.5 MICROgram(s) Inhaler 2 Puff(s) Inhalation two times a day  dextrose 5%. 1000 milliLiter(s) (70 mL/Hr) IV Continuous <Continuous>  heparin   Injectable 5000 Unit(s) SubCutaneous every 8 hours  OLANZapine 5 milliGRAM(s) Oral at bedtime  ondansetron Injectable 4 milliGRAM(s) IV Push once  pantoprazole    Tablet 40 milliGRAM(s) Oral before breakfast  senna 2 Tablet(s) Oral at bedtime      RADIOLOGY & ADDITIONAL TESTS:    22: US Renal (22 @ 19:21) >Bilateral hydronephrosis, mild on the right and moderate to severe on the   left.  A right nephrostomy tube is present.  4 mm nonobstructive calculus in the right kidney.    2. Complex cysts with the solid components or debris in the left kidney. If clinically indicated, abdominal MR without and with IV contrast may be   pursued for further evaluation.    22: Xray Chest 1 View- PORTABLE-Urgent (22 @ 01:40) Redemonstration of bilateral metastatic lung nodules.      MICROBIOLOGY DATA:  Culture - Urine (08.15.22 @ 19:55)   Specimen Source: Clean Catch Clean Catch (Midstream)   Culture Results: <10,000 CFU/mL Normal Urogenital Vandana     COVID-19 PCR (08.15.22 @ 19:55)   COVID-19 PCR: NotDetec:     Culture - Blood (08.15.22 @ 19:50)   Specimen Source: .Blood Blood-Peripheral   Culture Results: No growth to date.     Culture - Blood (08.15.22 @ 19:45)   Specimen Source: .Blood Blood-Peripheral   Culture Results: No growth to date.

## 2022-08-25 NOTE — PROGRESS NOTE ADULT - PROBLEM SELECTOR PLAN 2
- p/w Scr 1.41  s/p IVF blous  - sCR improved and stable  - renal US shows b/l hydronephrosis  - avoid nephrotoxins  - trend BMP  - Nephro Dr. Murcia following - p/w Scr 1.41  - s/p IVF blous  - sCR improved and stable  - renal US shows b/l hydronephrosis  - avoid nephrotoxins  - trend BMP  - Nephro Dr. Murcia following

## 2022-08-25 NOTE — PROGRESS NOTE ADULT - PROBLEM SELECTOR PROBLEM 8
Prophylactic measure
Discharge planning issues
Prophylactic measure

## 2022-08-25 NOTE — PROGRESS NOTE ADULT - ASSESSMENT
Confidential Drug Utilization Report  Search Terms: Zoë Bell, 1965Search Date: 08/24/2022 17:33:25 PM  The Drug Utilization Report below displays all of the controlled substance prescriptions, if any, that your patient has filled in the last twelve months. The information displayed on this report is compiled from pharmacy submissions to the Department, and accurately reflects the information as submitted by the pharmacies.    This report was requested by: Pat Mora | Reference #: [id number]    There are no results for the search terms that you entered.

## 2022-08-25 NOTE — PROGRESS NOTE ADULT - PROBLEM SELECTOR PLAN 1
Pt with chronic abdominal and b/l flank pain which is somatic and neuropathic in nature due to metastatic bladder CA and right nephrostomy tube. US renal demonstrates- Bilateral hydronephrosis, mild on the right and moderate to severe on the left. A right nephrostomy tube is present. 4 mm nonobstructive calculus in the right kidney. 2. Complex cysts with the solid components or debris in the left kidney.   Opioid pain recommendations   - Continue Tramadol 50mg PO q 6 hours PRN moderate pain. Monitor for sedation/ respiratory depression.   Non-opioid pain recommendations   - Continue Acetaminophen 650mg PO q 6 hours PRN mild pain. Monitor LFTs  Bowel Regimen  - Continue Miralax 17G PO daily PRN constipation   - Continue Senna 2 tablets at bedtime for constipation  Mild pain   - Non-pharmacological pain treatment recommendations  - Warm/ Cool packs PRN   - Repositioning, imagery, relaxation, distraction.  - Physical therapy OOB if no contraindications   Recommendations discussed with primary team and RN.

## 2022-08-25 NOTE — PROGRESS NOTE ADULT - ASSESSMENT
Patient is a 56y old  Female from home, post menopausal female, with PMH of Anemia, Asthma, Bladder cancer (previously on chemo until 5/2022) with  Dr. Renay Trinidad, R nephrostomy tube, hyperparathyroidism, bradycardia with PPM, now presents to the ER for evaluation of fever and chills at home.  She states that her nephrostomy tubes tends to get clogged often and she has been flushing it constantly.  Also she has been feeling lethargic, having Urinary frequency and abdominal pain. On admission, she has no fever, no Leukocytosis but cloudy urine and positive Urine analysis. She has started on Cefepime and the ID consult requested to assist with further evaluation and antibiotic management.    # Complicated UTI- in the setting of Right nephrostomy tube- Urine cx has no growth-  The right nephrostomy tube is not draining well, patient has to drain manually. She is s/p Right NT changed on 8/22/22, by IR.  # Bilateral hydronephrosis, mild on the right and moderate to severe on the left on Renal Us from 8/18/22  # Bladder cancer     would recommend:    1. Monitor OFF Abx as she completed the course  2. Pain management as needed  3. Monitor kidney function and IVF  4. Bowel regimen as needed  5. OOB to chair     Attending Attestation:    Spent more than 35 minutes on total encounter, more than 50 % of the visit was spent counseling and/or coordinating care by the Attending physician.

## 2022-08-25 NOTE — PROGRESS NOTE ADULT - PROBLEM SELECTOR PROBLEM 7
HTN (hypertension)
Prophylactic measure
HTN (hypertension)
Prophylactic measure
HTN (hypertension)

## 2022-08-25 NOTE — PROGRESS NOTE ADULT - PROBLEM SELECTOR PROBLEM 9
Problem: Falls - Risk of:  Goal: Will remain free from falls  1/24/2020 1454 by Trevor Sanchez RN  Outcome: Met This Shift  1/24/2020 0650 by Isael Spivey RN  Outcome: Met This Shift  Goal: Absence of physical injury  1/24/2020 0650 by Isael Spivey RN  Outcome: Met This Shift     Problem: Risk for Impaired Skin Integrity  Goal: Tissue integrity - skin and mucous membranes  1/24/2020 1454 by Trevor Sanchez RN  Outcome: Met This Shift  1/24/2020 0650 by Isael Spivey RN  Outcome: Met This Shift     Problem: Pain:  Goal: Pain level will decrease  1/24/2020 1454 by Trevor Sanchez RN  Outcome: Met This Shift  1/24/2020 0650 by Isael Spivey RN  Outcome: Met This Shift  Goal: Control of acute pain  1/24/2020 0650 by Isael Spivey RN  Outcome: Met This Shift  Goal: Control of chronic pain  1/24/2020 0650 by Isael Spivey RN  Outcome: Met This Shift     Problem: Musculor/Skeletal Functional Status  Goal: Highest potential functional level  1/24/2020 1454 by Trevor Sanchez RN  Outcome: Met This Shift  1/24/2020 0650 by Isael Spivey RN  Outcome: Not Met This Shift  Goal: Absence of falls  1/24/2020 0650 by Isael Spivey RN  Outcome: Met This Shift     Problem: Ineffective Breathing Pattern  Goal: Able to breathe comfortably  1/24/2020 1454 by Trevor Sanchez RN  Outcome: Met This Shift  1/24/2020 0650 by Isael Spivey RN  Outcome: Not Met This Shift     Problem: PAIN  Goal: Appropriate pain management  1/24/2020 1454 by Trevor Sanchez RN  Outcome: Met This Shift  1/24/2020 0650 by Isael Spivey RN  Outcome: Met This Shift     Problem: Mental Status - Risk of, Impaired  Goal: Mental status within normal limits for patient  1/24/2020 1454 by Trevor Sanchez RN  Outcome: Met This Shift  1/24/2020 0650 by Isael Spivey RN  Outcome: Met This Shift
Discharge planning issues

## 2022-08-25 NOTE — PROGRESS NOTE ADULT - ASSESSMENT
_________________________________________________________________________________________  ========>>  M E D I C A L   A T T E N D I N G    F O L L O W  U P  N O T E  <<=========  -----------------------------------------------------------------------------------------------------    - Patient seen and examined by me approximately sixty minutes ago.  - In summary,  JEF HOUSE is a 56y year old woman admitted with pain in back., possible UTI   - Patient still in some pain but some what more controlled on tramadol 50 mg .. does not want to get anything more..     ==================>> REVIEW OF SYSTEM <<=================    GEN: no fever, no chills, as above , +  pain as above   RESP: no SOB, no cough, no sputum  CVS: no chest pain, no palpitations, no edema  GI: no abdominal pain, no nausea,  : no dysuria, no frequency, no hematuria.. nephrostomy seems to be draining well   Neuro: no headache, no dizziness  Derm : no itching, no rash    ==================>> PHYSICAL EXAM <<=================    GEN: A&O X 3 , NAD , uncomfortable, calm but tearful at times cachectic   HEENT: NCAT, PERRL, MMM, hearing intact, temporal wasting   Neck: supple , no JVD appreciated  CVS: S1S2 , regular , No M/R/G appreciated  PULM: CTA B/L,  no W/R/R appreciated  ABD.: soft. non tender, non distended,  bowel sounds present  Extrem: intact pulses , no edema   PSYCH : normal mood,  not anxious, as above                              ( Note written / Date of service 08-25-22 )    ==================>> MEDICATIONS <<====================    atorvastatin 40 milliGRAM(s) Oral at bedtime  budesonide  80 MICROgram(s)/formoterol 4.5 MICROgram(s) Inhaler 2 Puff(s) Inhalation two times a day  dextrose 5%. 1000 milliLiter(s) IV Continuous <Continuous>  heparin   Injectable 5000 Unit(s) SubCutaneous every 8 hours  OLANZapine 5 milliGRAM(s) Oral at bedtime  ondansetron Injectable 4 milliGRAM(s) IV Push once  pantoprazole    Tablet 40 milliGRAM(s) Oral before breakfast  senna 2 Tablet(s) Oral at bedtime    MEDICATIONS  (PRN):  acetaminophen     Tablet .. 650 milliGRAM(s) Oral every 6 hours PRN Temp greater or equal to 38C (100.4F), Mild Pain (1 - 3)  aluminum hydroxide/magnesium hydroxide/simethicone Suspension 30 milliLiter(s) Oral every 4 hours PRN Dyspepsia  diazepam    Tablet 5 milliGRAM(s) Oral three times a day PRN back spasm  melatonin 3 milliGRAM(s) Oral at bedtime PRN Insomnia  polyethylene glycol 3350 17 Gram(s) Oral daily PRN Constipation  traMADol 50 milliGRAM(s) Oral every 6 hours PRN Moderate Pain (4 - 6)    ___________  Active diet:  Diet, Regular  ___________________    ==================>> VITAL SIGNS <<==================    Vital Signs Last 24 HrsT(C): 36.6 (08-25-22 @ 14:17)  T(F): 97.8 (08-25-22 @ 14:17), Max: 98 (08-25-22 @ 04:37)  HR: 66 (08-25-22 @ 14:17) (63 - 68)  BP: 105/56 (08-25-22 @ 14:17)  RR: 16 (08-25-22 @ 14:17) (16 - 16)  SpO2: 100% (08-25-22 @ 14:17) (100% - 100%)      CAPILLARY BLOOD GLUCOSE  POCT Blood Glucose.: 92 mg/dL (25 Aug 2022 17:01)  POCT Blood Glucose.: 96 mg/dL (25 Aug 2022 11:41)  POCT Blood Glucose.: 86 mg/dL (25 Aug 2022 07:44)  POCT Blood Glucose.: 83 mg/dL (24 Aug 2022 21:27)     ==================>> LAB AND IMAGING <<==================                        9.8    9.02  )-----------( 369      ( 25 Aug 2022 06:25 )             32.6        08-25    137  |  104  |  21<H>  ----------------------------<  78  4.2   |  27  |  1.04    Ca    10.4      25 Aug 2022 06:25    WBC count:   9.02 <<== ,  9.05 <<== ,  9.31 <<== ,  8.03 <<== ,  8.07 <<==   Hemoglobin:   9.8 <<==,  9.1 <<==,  9.5 <<==,  9.2 <<==,  9.4 <<==  platelets:  369 <==, 320 <==, 397 <==, 375 <==, 383 <==, 390 <==    Creatinine:  1.04  <<==, 1.11  <<==, 1.30  <<==, 1.35  <<==, 1.40  <<==, 1.33  <<==  Sodium:   137  <==, 138  <==, 137  <==, 141  <==, 136  <==, 137  <==    ____________________________    M I C R O B I O L O G Y :    Culture - Urine (collected 16 Aug 2022 21:40)  Source: .Urine Nephrostomy - Right  Final Report (18 Aug 2022 09:15):    No growth    Culture - Urine (collected 15 Aug 2022 19:55)  Source: Clean Catch Clean Catch (Midstream)  Final Report (16 Aug 2022 18:02):    <10,000 CFU/mL Normal Urogenital Vandana    Culture - Blood (collected 15 Aug 2022 19:50)  Source: .Blood Blood-Peripheral  Final Report (20 Aug 2022 22:00):    No Growth Final    Culture - Blood (collected 15 Aug 2022 19:45)  Source: .Blood Blood-Peripheral  Final Report (20 Aug 2022 22:00):    No Growth Final      COVID-19 PCR: NotDetec (08-22-22 @ 05:36)  COVID-19 PCR: NotDetec (08-15-22 @ 19:55)    ___________________________________________________________________________________  ===============>>  A S S E S S M E N T   A N D   P L A N <<===============  ------------------------------------------------------------------------------------------    · Assessment	  This is a 56 year old female from home, post menopausal female, with PMH of Anemia, Asthma, Bladder cancer (previously on chemo until 5/2022), R nephrostomy tube, hyperparathyroidism, bradycardia with PPM coming in UTI.     Problem/Plan - 1:  ·  Problem:  complicated UTI in pt with cancer and nephrostomy tube   finished ABx as per ID   nephrostomy change done     ** gradually worsening renal function / creatinine and chronic proteinuria , now also with hypernatremia        CRYSTAL on CKD II      nephrology following, appreciated      monitor BMP     encouraged Po hydration      proteinurea : unclear etiology >> monitor for now    Problem/Plan - 2:  ·  Problem: Asthma.   ·  Plan: - Pt has a history of Asthma.   - Will continue pts home medication Symbicort.    Problem/Plan - 3:  ·  Problem: Bladder cancer.   ·  Plan: - Pt has a history of bladder cancer with mets to the liver.   - follows  Dr. Renay Trinidad,  - pt has a PET scan scheduled later this month to monitor progress..     >> pain likely related to cancer and not kidney infection as pt thinks         unfortunately pt declining to have further CT scans, declining to take narcotics for pain mgmt, now ok with seeing pain mgmt              continue tramadol 50mg for now and valium 5 mg PRN muscle spasm     Problem/Plan - 4:  ·  Problem: HLD (hyperlipidemia).   ·  Plan: - Pt has a history of HLD.   - Will continue pts home medication Atorvastatin.    Problem/Plan - 5:  ·  Problem: HTN (hypertension).   ·  Plan: - Pt has a history of HTN.   - Currently not on any medication.   - Continue to monitor.    Problem/Plan - 6:  ·  Problem: Prophylactic measure.   ·  Plan: - Heparin - DVT.  encouraged PO intake     dispo planing home when more stable / pain better managed..  > to go home tomorrow morning     --------------------------------------------  Case discussed with pt, NP  ___________________________  HLeslee Ochoa D.O.  Pager: [phone]

## 2022-08-25 NOTE — PROGRESS NOTE ADULT - PROBLEM SELECTOR PLAN 3
- p/w serum sodium 161  - now resolved  - s/p IVF D5W  - trend BMP  - avoid nephrotoxins  - Nephro following Dr. Murcia

## 2022-08-25 NOTE — PROGRESS NOTE ADULT - PROBLEM SELECTOR PLAN 9
- S/P nephrostomy tube change in IR ( monday 8/22/22)  - pending pain management consult - S/P nephrostomy tube change in IR ( monday 8/22/22)  - 24 hour notice given

## 2022-08-25 NOTE — PROGRESS NOTE ADULT - PROBLEM SELECTOR PLAN 1
- UA positive  - Urine cx & Blood cx NGTD   - R nephrostomy culture 8/16 negative  - s/p Cefepime completed 08/23, monitor off abx  - ID Dr. Rodriguez following

## 2022-08-25 NOTE — PROGRESS NOTE ADULT - NUTRITIONAL ASSESSMENT
This patient has been assessed with a concern for Malnutrition and has been determined to have a diagnosis/diagnoses of Severe protein-calorie malnutrition and Underweight (BMI < 19).    This patient is being managed with:   Diet Regular-  Supplement Feeding Modality:  Oral  Ensure Plant-Based Cans or Servings Per Day:  1       Frequency:  Two Times a day  Entered: Aug 22 2022  5:35PM    Diet Regular-  Entered: Aug 16 2022  2:33AM    The following pending diet order is being considered for treatment of Severe protein-calorie malnutrition and Underweight (BMI < 19):null
This patient has been assessed with a concern for Malnutrition and has been determined to have a diagnosis/diagnoses of Severe protein-calorie malnutrition and Underweight (BMI < 19).    This patient is being managed with:   Diet Regular-  Entered: Aug 16 2022  2:33AM    
This patient has been assessed with a concern for Malnutrition and has been determined to have a diagnosis/diagnoses of Severe protein-calorie malnutrition and Underweight (BMI < 19).    This patient is being managed with:   Diet Regular-  Supplement Feeding Modality:  Oral  Ensure Plant-Based Cans or Servings Per Day:  1       Frequency:  Two Times a day  Entered: Aug 22 2022  5:35PM    Diet Regular-  Entered: Aug 16 2022  2:33AM    The following pending diet order is being considered for treatment of Severe protein-calorie malnutrition and Underweight (BMI < 19):null
This patient has been assessed with a concern for Malnutrition and has been determined to have a diagnosis/diagnoses of Severe protein-calorie malnutrition and Underweight (BMI < 19).    This patient is being managed with:   Diet Regular-  Supplement Feeding Modality:  Oral  Ensure Plant-Based Cans or Servings Per Day:  1       Frequency:  Two Times a day  Entered: Aug 22 2022  5:35PM    Diet Regular-  Entered: Aug 16 2022  2:33AM    The following pending diet order is being considered for treatment of Severe protein-calorie malnutrition and Underweight (BMI < 19):null
This patient has been assessed with a concern for Malnutrition and has been determined to have a diagnosis/diagnoses of Severe protein-calorie malnutrition and Underweight (BMI < 19).    This patient is being managed with:   Diet Regular-  Entered: Aug 16 2022  2:33AM

## 2022-08-25 NOTE — PROGRESS NOTE ADULT - SUBJECTIVE AND OBJECTIVE BOX
Source of information: [facility]SE, Chart review  Patient language: English  : n/a    HPI:  This is a 56 year old female from home, post menopausal female, with PMH of Anemia, Asthma, Bladder cancer (previously on chemo until 5/2022) with  Dr. Renay Trinidad, R nephrostomy tube, hyperparathyroidism, bradycardia with PPM coming in for fevers and chills at home.   She states that her nephrostomy tubes tends to get clogged often and she has been flushing it constantly. Since yesterday she has been feeling lethargic, with chills and abdominal pain. She states in the past similar symptoms occurred when she was found to have UTI.   She denies any headaches, visual disturbances, dizziness, falls, chest pain, palpitations, lower extremity swelling, fevers, skin rash, recent travel, or sick contacts   (16 Aug 2022 02:35)    US renal demonstrates- Bilateral hydronephrosis, mild on the right and moderate to severe on the   left. A right nephrostomy tube is present. 4 mm nonobstructive calculus in the right kidney. 2. Complex cysts with the solid components or debris in the left kidney.   Dx with UTI and CRYSTAL. Pt with hx metastatic bladder CA and right nephrostomy tube. Pain consulted for flank pain. Followed pt during prior admission in 8/2022. Pt seen and examined at bedside. Pt sitting in bed, reports b/l flank pain radiating to abdomen. Rating pain score 6/10 and tolerable with current pain regimen SCALE USED: (1-10 VNRS). Pt describes pain as constant, aching, alleviated by current pain medication (tramadol increased to 50mg 8/24), exacerbated by movement. Reports abdominal spasms. States she does not want to take oxycodone or morphine at this time. Pt tolerating PO diet. Denies lethargy, chest pain, SOB, vomiting. Reports intermittent nausea, improved by eating small snack prior to taking PRN pain meds. Also reports constipation, last BM 8/21. Pt states she took senna last night and will take miralax today. Pt reports blood upon urination. No blood noted in nephrostomy bag at this time. Patient stated goal for pain control: to be able to take deep breaths, get out of bed to chair and ambulate with tolerable pain control. Questions and concerns addressed. Plan to be discharged tomorrow.     PAST MEDICAL & SURGICAL HISTORY:  Anemia      Asthma      HLD (hyperlipidemia)      Pacemaker  [employer]      Bladder cancer      HTN (hypertension)      Parathyroid tumor      Liver cyst      Hydronephrosis  has right Nephrostomy tube - dressing intact      Bradycardia      History of renal calculi      H/O myomectomy      Presence of cardiac pacemaker      H/O hydronephrosis  s/p Right Perc nephrostomy tube placement 02/2021, exchange 06/2021          FAMILY HISTORY:  Family history of prostate cancer (Father)    Family history of CHF (congestive heart failure) (Father)    Family history of atrial fibrillation (Father)        Social History:  pt lives at home, denies any history of alcohol, smoking or drug use. (16 Aug 2022 02:35)   [X ] Denies ETOH use, illicit drug use and smoking    Allergies    No Known Allergies    MEDICATIONS  (STANDING):  atorvastatin 40 milliGRAM(s) Oral at bedtime  budesonide  80 MICROgram(s)/formoterol 4.5 MICROgram(s) Inhaler 2 Puff(s) Inhalation two times a day  dextrose 5%. 1000 milliLiter(s) (70 mL/Hr) IV Continuous <Continuous>  heparin   Injectable 5000 Unit(s) SubCutaneous every 8 hours  OLANZapine 5 milliGRAM(s) Oral at bedtime  ondansetron Injectable 4 milliGRAM(s) IV Push once  pantoprazole    Tablet 40 milliGRAM(s) Oral before breakfast  senna 2 Tablet(s) Oral at bedtime    MEDICATIONS  (PRN):  acetaminophen     Tablet .. 650 milliGRAM(s) Oral every 6 hours PRN Temp greater or equal to 38C (100.4F), Mild Pain (1 - 3)  aluminum hydroxide/magnesium hydroxide/simethicone Suspension 30 milliLiter(s) Oral every 4 hours PRN Dyspepsia  diazepam    Tablet 5 milliGRAM(s) Oral three times a day PRN back spasm  melatonin 3 milliGRAM(s) Oral at bedtime PRN Insomnia  polyethylene glycol 3350 17 Gram(s) Oral daily PRN Constipation  traMADol 50 milliGRAM(s) Oral every 6 hours PRN Moderate Pain (4 - 6)      Vital Signs Last 24 Hrs  T(C): 36.7 (25 Aug 2022 04:37), Max: 36.7 (25 Aug 2022 04:37)  T(F): 98 (25 Aug 2022 04:37), Max: 98 (25 Aug 2022 04:37)  HR: 68 (25 Aug 2022 04:37) (63 - 69)  BP: 108/56 (25 Aug 2022 04:37) (97/59 - 108/56)  BP(mean): --  RR: 16 (25 Aug 2022 04:37) (16 - 20)  SpO2: 100% (25 Aug 2022 04:37) (100% - 100%)    Parameters below as of 25 Aug 2022 04:37  Patient On (Oxygen Delivery Method): room air      COVID-19 PCR: NotDetec (22 Aug 2022 05:36)  COVID-19 PCR: NotDetec (15 Aug 2022 19:55)  SARS-CoV-2: NotDetec (31 Jul 2022 10:35)  COVID-19 PCR: NotDetec (18 Jul 2022 05:09)  COVID-19 PCR: NotDetec (04 Jul 2022 06:00)  COVID-19 PCR: NotDetec (27 Jun 2022 18:11)  SARS-CoV-2: NotDetec (09 Jun 2022 20:56)  COVID-19 PCR: NotDetec (04 May 2022 11:56)  COVID-19 PCR: NotDetec (23 Mar 2022 14:48)  COVID-19 PCR: NotDetec (08 Mar 2022 19:08)  COVID-19 PCR: NotDetec (02 Mar 2022 15:54)    LABS: Reviewed                          9.8    9.02  )-----------( 369      ( 25 Aug 2022 06:25 )             32.6     08-25    137  |  104  |  21<H>  ----------------------------<  78  4.2   |  27  |  1.04    Ca    10.4      25 Aug 2022 06:25    CAPILLARY BLOOD GLUCOSE      POCT Blood Glucose.: 86 mg/dL (25 Aug 2022 07:44)  POCT Blood Glucose.: 83 mg/dL (24 Aug 2022 21:27)  POCT Blood Glucose.: 88 mg/dL (24 Aug 2022 16:54)  POCT Blood Glucose.: 97 mg/dL (24 Aug 2022 11:50)  Radiology: Reviewed.   < from: US Renal (08.18.22 @ 19:21) >    ACC: [id number] EXAM:  US KIDNEY(S)                          PROCEDURE DATE:  08/18/2022          INTERPRETATION:  CLINICAL INFORMATION: Acute kidney injury    COMPARISON: No prior renal ultrasounds available for comparison.    TECHNIQUE: Sonography of the kidneys and bladder.    FINDINGS:  Right kidney: 10.1 cm. Mild right hydronephrosis. 4 mm nonobstructive   calculus in the lower pole of the right kidney. No right renal mass is   identified. A right nephrostomy tube is present.    Left kidney:12.7 cm. 2 lower pole complex left renal cysts measuring 3.5   x 4.0 x 4.3 cm and 3.7 x 3.9 x 2.7 cm with solid components or debris.   Moderate to severe hydronephrosis. No evidence for a calculus in the left   kidney.    Urinary bladder: Collapsed    IMPRESSION:  Bilateral hydronephrosis, mild on the right and moderate to severe on the   left.    A right nephrostomy tube is present.    4 mm nonobstructive calculus in the right kidney.    2. Complex cysts with the solid components or debris in the left kidney.   If clinically indicated, abdominal MR without and with IV contrast may be   pursued for further evaluation.    --- End of Report ---            MORRIS HIGGINS MD; Attending Radiologist  This document has been electronically signed. Aug 18 2022  8:48PM    < end of copied text >      ORT Score -   Family Hx of substance abuse	Female	      Male  Alcohol 	                                           1                     3  Illegal drugs	                                   2                     3  Rx drugs                                           4 	                  4  Personal Hx of substance abuse		  Alcohol 	                                          3	                  3  Illegal drugs                                     4	                  4  Rx drugs                                            5 	                  5  Age between 16- 45 years	           1                     1  hx preadolescent sexual abuse	   3 	                  0  Psychological disease		  ADD, OCD, bipolar, schizophrenia   2	          2  Depression                                           1 	          1  Total: 0    a score of 3 or lower indicates low risk for opioid abuse		  a score of 4-7 indicates moderate risk for opioid abuse		  a score of 8 or higher indicates high risk for opioid abuse  	  REVIEW OF SYSTEMS:  CONSTITUTIONAL: No fever + fatigue  RESPIRATORY: No cough, wheezing, chills or hemoptysis; No shortness of breath  CARDIOVASCULAR: No chest pain, palpitations, dizziness, or leg swelling  GASTROINTESTINAL: No loss of appetite, decreased PO intake. + abdominal pain. + occasional nausea, no vomiting; No diarrhea +constipation.   GENITOURINARY: + b/l flank pain; + mild hematuria x 3 days upon urinartion; + right nephrostomy tube; No dysuria, frequency, retention or incontinence  MUSCULOSKELETAL: No joint pain or swelling; No muscle, back, or extremity pain, no upper or lower motor strength weakness, no saddle anesthesia, bowel/bladder incontinence, no falls   NEURO: No headaches, No numbness/tingling b/l LE, No weakness    PHYSICAL EXAM:  GENERAL:  Alert & Oriented X4, cooperative, NAD, Good concentration. Speech is clear.   RESPIRATORY: Respirations even and unlabored. Clear to auscultation bilaterally; No rales, rhonchi, wheezing, or rubs  CARDIOVASCULAR: Normal S1/S2, regular rate and rhythm; No murmurs, rubs, or gallops. No JVD.   GASTROINTESTINAL:  Soft, + lower abdominal tenderness, Nondistended; Bowel sounds present  GENITOURINARY: + right flank nephrostomy tube draining clear yellow urine; + right flank dressing c/d/i   PERIPHERAL VASCULAR:  Extremities warm without edema. 2+ Peripheral Pulses, No cyanosis, No calf tenderness  MUSCULOSKELETAL: Motor Strength 5/5 B/L upper and lower extremities; moves all extremities equally against gravity; ROM intact; negative SLR; + b/l flank tenderness   SKIN: Warm, dry, intact. No rashes, lesions, scars or wounds. + left forehead birthmark noted.    Risk factors associated with adverse outcomes related to opioid treatment  [ ]  Concurrent benzodiazepine use  [ ]  History/ Active substance use or alcohol use disorder  [ ] Psychiatric co-morbidity  [ ] Sleep apnea  [ ] COPD  [ ] BMI> 35  [ ] Liver dysfunction  [ ] Renal dysfunction  [ ] CHF  [ ] Smoker  [ ]  Age > 60 years    [X ]  [facility]  Reviewed and Copied to Chart. See below.    Plan of care and goal oriented pain management treatment options were discussed with patient and /or primary care giver; all questions and concerns were addressed and care was aligned with patient's wishes.    Educated patient on goal oriented pain management treatment options     08-25-22 @ 10:57

## 2022-08-25 NOTE — PROGRESS NOTE ADULT - ASSESSMENT
56 year old female from home with pmhx of Anemia, Asthma, Bladder cancer (previously on chemo until 5/2022) with Dr. Renay Trinidad, R nephrostomy tube, hyperparathyroidism, bradycardia with PPM coming in for fevers, chills, abd pain at home. Admitted for acute complicated UTI,  UA positive started on Cefepime. Dr Jennifer CALIX consulted. Renal US shows b/l hydronephrosis, rt kidney stone and nephrostomy tube and left cyst. IR consulted for nephrostomy tube exchanged, also noted to have CRYSTAL, s/p IVF bolus, worsening kidney fx, Nephro Dr. Murcia consulted. Patient s/p IR guided right nephrostomy tube exchange, continue cefepime until 8/23 per ID. Scr slowly improving. Hospital course c/b persistent lower back pain, Toradol given with minimal improvement. Pain management consulted. Patient refusing 56 year old female from home with pmhx of Anemia, Asthma, Bladder cancer (previously on chemo until 5/2022) with Dr. Renay Trinidad, R nephrostomy tube, hyperparathyroidism, bradycardia with PPM coming in for fevers, chills, abd pain at home. Admitted for acute complicated UTI,  UA positive started on Cefepime. Dr Jennifer CALIX consulted. Renal US shows b/l hydronephrosis, rt kidney stone and nephrostomy tube and left cyst. IR consulted for nephrostomy tube exchanged, also noted to have CRYSTAL, s/p IVF bolus, worsening kidney fx, Nephro Dr. Murcia consulted. Patient s/p IR guided right nephrostomy tube exchange, continue cefepime until 8/23 per ID. Scr slowly improving. Hospital course c/b persistent lower back pain, Toradol given with minimal improvement. Pain management consulted. Patient states that pain has minimally improved on Toradol but also does not want pain medications that are stronger. 24 hours notice given.

## 2022-08-26 NOTE — PROGRESS NOTE ADULT - ASSESSMENT
Confidential Drug Utilization Report  Search Terms: Zoë Bell, 1965Search Date: 08/24/2022 17:33:25 PM  The Drug Utilization Report below displays all of the controlled substance prescriptions, if any, that your patient has filled in the last twelve months. The information displayed on this report is compiled from pharmacy submissions to the Department, and accurately reflects the information as submitted by the pharmacies.    This report was requested by: Pat Mora | Reference #: [id number]    There are no results for the search terms that you entered.

## 2022-08-26 NOTE — PROGRESS NOTE ADULT - SUBJECTIVE AND OBJECTIVE BOX
Source of information: [facility], Chart review  Patient language: English  : n/a    HPI:  This is a 56 year old female from home, post menopausal female, with PMH of Anemia, Asthma, Bladder cancer (previously on chemo until 5/2022) with  Dr. Renay Trinidad, R nephrostomy tube, hyperparathyroidism, bradycardia with PPM coming in for fevers and chills at home.   She states that her nephrostomy tubes tends to get clogged often and she has been flushing it constantly. Since yesterday she has been feeling lethargic, with chills and abdominal pain. She states in the past similar symptoms occurred when she was found to have UTI.   She denies any headaches, visual disturbances, dizziness, falls, chest pain, palpitations, lower extremity swelling, fevers, skin rash, recent travel, or sick contacts   (16 Aug 2022 02:35)    US renal demonstrates- Bilateral hydronephrosis, mild on the right and moderate to severe on the   left. A right nephrostomy tube is present. 4 mm nonobstructive calculus in the right kidney. 2. Complex cysts with the solid components or debris in the left kidney.   Dx with UTI and CRYSTAL. Pt with hx metastatic bladder CA and right nephrostomy tube. Pain consulted for flank pain. Followed pt during prior admission in 8/2022. Pt seen and examined at bedside. Pt sitting in bed, reports b/l flank pain radiating to abdomen. Rating pain score 7/10 and tolerable with current pain regimen SCALE USED: (1-10 VNRS). Pt describes pain as constant, aching, alleviated by current pain medication (tramadol increased to 50mg 8/24), exacerbated by movement. Reports abdominal spasms. States she does not want to take oxycodone or morphine at this time. Pt tolerating PO diet. Denies lethargy, chest pain, SOB, vomiting. Reports intermittent nausea, improved by eating small snack prior to taking PRN pain meds. Also reports constipation, last BM 8/21. Agreeable to take dulcolax today. Pt reports concern about hematuria since nephrostomy tube was replaced and small sediments in nephrostomy bag. No blood noted in nephrostomy bag at this time. UA ordered per primary team. Patient stated goal for pain control: to be able to take deep breaths, get out of bed to chair and ambulate with tolerable pain control.     PAST MEDICAL & SURGICAL HISTORY:  Anemia      Asthma      HLD (hyperlipidemia)      Pacemaker  [employer]      Bladder cancer      HTN (hypertension)      Parathyroid tumor      Liver cyst      Hydronephrosis  has right Nephrostomy tube - dressing intact      Bradycardia      History of renal calculi      H/O myomectomy      Presence of cardiac pacemaker      H/O hydronephrosis  s/p Right Perc nephrostomy tube placement 02/2021, exchange 06/2021          FAMILY HISTORY:  Family history of prostate cancer (Father)    Family history of CHF (congestive heart failure) (Father)    Family history of atrial fibrillation (Father)        Social History:  pt lives at home, denies any history of alcohol, smoking or drug use. (16 Aug 2022 02:35)   [X ] Denies ETOH use, illicit drug use and smoking    Allergies    No Known Allergies    MEDICATIONS  (STANDING):  atorvastatin 40 milliGRAM(s) Oral at bedtime  budesonide  80 MICROgram(s)/formoterol 4.5 MICROgram(s) Inhaler 2 Puff(s) Inhalation two times a day  dextrose 5%. 1000 milliLiter(s) (70 mL/Hr) IV Continuous <Continuous>  heparin   Injectable 5000 Unit(s) SubCutaneous every 8 hours  OLANZapine 5 milliGRAM(s) Oral at bedtime  ondansetron Injectable 4 milliGRAM(s) IV Push once  pantoprazole    Tablet 40 milliGRAM(s) Oral before breakfast  senna 2 Tablet(s) Oral at bedtime    MEDICATIONS  (PRN):  acetaminophen     Tablet .. 650 milliGRAM(s) Oral every 6 hours PRN Temp greater or equal to 38C (100.4F), Mild Pain (1 - 3)  aluminum hydroxide/magnesium hydroxide/simethicone Suspension 30 milliLiter(s) Oral every 4 hours PRN Dyspepsia  bisacodyl 5 milliGRAM(s) Oral every 12 hours PRN Constipation  diazepam    Tablet 5 milliGRAM(s) Oral three times a day PRN back spasm  melatonin 3 milliGRAM(s) Oral at bedtime PRN Insomnia  polyethylene glycol 3350 17 Gram(s) Oral daily PRN Constipation  traMADol 50 milliGRAM(s) Oral every 6 hours PRN Moderate Pain (4 - 6)      Vital Signs Last 24 Hrs  T(C): 36.5 (26 Aug 2022 04:53), Max: 36.6 (25 Aug 2022 14:17)  T(F): 97.7 (26 Aug 2022 04:53), Max: 97.8 (25 Aug 2022 14:17)  HR: 70 (26 Aug 2022 04:53) (64 - 70)  BP: 102/54 (26 Aug 2022 04:53) (102/54 - 117/60)  BP(mean): --  RR: 16 (26 Aug 2022 04:53) (16 - 16)  SpO2: 100% (26 Aug 2022 04:53) (100% - 100%)    Parameters below as of 26 Aug 2022 04:53  Patient On (Oxygen Delivery Method): room air      COVID-19 PCR: NotDetec (22 Aug 2022 05:36)  COVID-19 PCR: NotDetec (15 Aug 2022 19:55)  SARS-CoV-2: NotDetec (31 Jul 2022 10:35)  COVID-19 PCR: NotDetec (18 Jul 2022 05:09)  COVID-19 PCR: NotDetec (04 Jul 2022 06:00)  COVID-19 PCR: NotDetec (27 Jun 2022 18:11)  SARS-CoV-2: NotDetec (09 Jun 2022 20:56)  COVID-19 PCR: NotDetec (04 May 2022 11:56)  COVID-19 PCR: NotDetec (23 Mar 2022 14:48)  COVID-19 PCR: NotDetec (08 Mar 2022 19:08)  COVID-19 PCR: NotDetec (02 Mar 2022 15:54)    LABS: Reviewed                          9.8    9.02  )-----------( 369      ( 25 Aug 2022 06:25 )             32.6     08-25    137  |  104  |  21<H>  ----------------------------<  78  4.2   |  27  |  1.04    Ca    10.4      25 Aug 2022 06:25            CAPILLARY BLOOD GLUCOSE      POCT Blood Glucose.: 83 mg/dL (26 Aug 2022 07:53)  POCT Blood Glucose.: 74 mg/dL (25 Aug 2022 21:35)  POCT Blood Glucose.: 92 mg/dL (25 Aug 2022 17:01)  POCT Blood Glucose.: 96 mg/dL (25 Aug 2022 11:41)    Radiology: Reviewed.   < from: US Renal (08.18.22 @ 19:21) >    ACC: [id number] EXAM:  US KIDNEY(S)                          PROCEDURE DATE:  08/18/2022          INTERPRETATION:  CLINICAL INFORMATION: Acute kidney injury    COMPARISON: No prior renal ultrasounds available for comparison.    TECHNIQUE: Sonography of the kidneys and bladder.    FINDINGS:  Right kidney: 10.1 cm. Mild right hydronephrosis. 4 mm nonobstructive   calculus in the lower pole of the right kidney. No right renal mass is   identified. A right nephrostomy tube is present.    Left kidney:12.7 cm. 2 lower pole complex left renal cysts measuring 3.5   x 4.0 x 4.3 cm and 3.7 x 3.9 x 2.7 cm with solid components or debris.   Moderate to severe hydronephrosis. No evidence for a calculus in the left   kidney.    Urinary bladder: Collapsed    IMPRESSION:  Bilateral hydronephrosis, mild on the right and moderate to severe on the   left.    A right nephrostomy tube is present.    4 mm nonobstructive calculus in the right kidney.    2. Complex cysts with the solid components or debris in the left kidney.   If clinically indicated, abdominal MR without and with IV contrast may be   pursued for further evaluation.    --- End of Report ---            MORRIS HIGGINS MD; Attending Radiologist  This document has been electronically signed. Aug 18 2022  8:48PM    < end of copied text >      ORT Score -   Family Hx of substance abuse	Female	      Male  Alcohol 	                                           1                     3  Illegal drugs	                                   2                     3  Rx drugs                                           4 	                  4  Personal Hx of substance abuse		  Alcohol 	                                          3	                  3  Illegal drugs                                     4	                  4  Rx drugs                                            5 	                  5  Age between 16- 45 years	           1                     1  hx preadolescent sexual abuse	   3 	                  0  Psychological disease		  ADD, OCD, bipolar, schizophrenia   2	          2  Depression                                           1 	          1  Total: 0    a score of 3 or lower indicates low risk for opioid abuse		  a score of 4-7 indicates moderate risk for opioid abuse		  a score of 8 or higher indicates high risk for opioid abuse  	  REVIEW OF SYSTEMS:  CONSTITUTIONAL: No fever + fatigue  RESPIRATORY: No cough, wheezing, chills or hemoptysis; No shortness of breath  CARDIOVASCULAR: No chest pain, palpitations, dizziness, or leg swelling  GASTROINTESTINAL: No loss of appetite, decreased PO intake. + abdominal pain. + occasional nausea, no vomiting; No diarrhea +constipation.   GENITOURINARY: + b/l flank pain; + hematuria;  + right nephrostomy tube; + small sediments in nephrostomy bag; No dysuria, frequency, retention or incontinence  MUSCULOSKELETAL: No joint pain or swelling; No muscle, back, or extremity pain, no upper or lower motor strength weakness, no saddle anesthesia, bowel/bladder incontinence, no falls   NEURO: No headaches, No numbness/tingling b/l LE, No weakness    PHYSICAL EXAM:  GENERAL:  Alert & Oriented X4, cooperative, NAD, Good concentration. Speech is clear.   RESPIRATORY: Respirations even and unlabored. Clear to auscultation bilaterally; No rales, rhonchi, wheezing, or rubs  CARDIOVASCULAR: Normal S1/S2, regular rate and rhythm; No murmurs, rubs, or gallops. No JVD.   GASTROINTESTINAL:  Soft, + lower abdominal tenderness, Nondistended; Bowel sounds present  GENITOURINARY: + right flank nephrostomy tube draining clear yellow urine with small sediments noted in nephrostomy bag; + right flank dressing c/d/i   PERIPHERAL VASCULAR:  Extremities warm without edema. 2+ Peripheral Pulses, No cyanosis, No calf tenderness  MUSCULOSKELETAL: Motor Strength 5/5 B/L upper and lower extremities; moves all extremities equally against gravity; ROM intact; negative SLR; + b/l flank tenderness   SKIN: Warm, dry, intact. No rashes, lesions, scars or wounds. + left forehead birthmark noted.    Risk factors associated with adverse outcomes related to opioid treatment  [ ]  Concurrent benzodiazepine use  [ ]  History/ Active substance use or alcohol use disorder  [ ] Psychiatric co-morbidity  [ ] Sleep apnea  [ ] COPD  [ ] BMI> 35  [ ] Liver dysfunction  [ ] Renal dysfunction  [ ] CHF  [ ] Smoker  [ ]  Age > 60 years    [X ]  [facility]  Reviewed and Copied to Chart. See below.    Plan of care and goal oriented pain management treatment options were discussed with patient and /or primary care giver; all questions and concerns were addressed and care was aligned with patient's wishes.    Educated patient on goal oriented pain management treatment options     08-26-22 @ 10:49

## 2022-08-26 NOTE — PROGRESS NOTE ADULT - ASSESSMENT
_________________________________________________________________________________________  ========>>  M E D I C A L   A T T E N D I N G    F O L L O W  U P  N O T E  <<=========  -----------------------------------------------------------------------------------------------------    - Patient seen and examined by me earlier today.   - In summary,  [facility] is a 56y year old woman admitted with pain in back., possible UTI   - Patient still in some pain, feels constipated.. pain overall better controlled on tramadol .. does not want to get anything more..       pt asking strongly to check a U/A as urine from bladder is cloudy !     ==================>> REVIEW OF SYSTEM <<=================    GEN: no fever, no chills, as above , +  pain as above   RESP: no SOB, no cough, no sputum  CVS: no chest pain, no palpitations, no edema  GI: no abdominal pain, no nausea,++ constipated <> awaiting Ducolax   : no dysuria, no frequency, no hematuria.. nephrostomy seems to be draining well   Neuro: no headache, no dizziness  Derm : no itching, no rash    ==================>> PHYSICAL EXAM <<=================    GEN: A&O X 3 , NAD , uncomfortable, calm but tearful at times cachectic   HEENT: NCAT, PERRL, MMM, hearing intact, temporal wasting   Neck: supple , no JVD appreciated  CVS: S1S2 , regular , No M/R/G appreciated  PULM: CTA B/L,  no W/R/R appreciated  ABD.: soft. non tender, non distended,  bowel sounds present  Extrem: intact pulses , no edema   PSYCH : normal mood,  not anxious, as above                              ( note written / Date of service   08-26-22 )    ==================>> MEDICATIONS <<====================    atorvastatin 40 milliGRAM(s) Oral at bedtime  budesonide  80 MICROgram(s)/formoterol 4.5 MICROgram(s) Inhaler 2 Puff(s) Inhalation two times a day  dextrose 5%. 1000 milliLiter(s) IV Continuous <Continuous>  heparin   Injectable 5000 Unit(s) SubCutaneous every 8 hours  OLANZapine 5 milliGRAM(s) Oral at bedtime  ondansetron Injectable 4 milliGRAM(s) IV Push once  pantoprazole    Tablet 40 milliGRAM(s) Oral before breakfast  senna 2 Tablet(s) Oral at bedtime    MEDICATIONS  (PRN):  acetaminophen     Tablet .. 650 milliGRAM(s) Oral every 6 hours PRN Temp greater or equal to 38C (100.4F), Mild Pain (1 - 3)  aluminum hydroxide/magnesium hydroxide/simethicone Suspension 30 milliLiter(s) Oral every 4 hours PRN Dyspepsia  bisacodyl 5 milliGRAM(s) Oral every 12 hours PRN Constipation  diazepam    Tablet 5 milliGRAM(s) Oral three times a day PRN back spasm  melatonin 3 milliGRAM(s) Oral at bedtime PRN Insomnia  polyethylene glycol 3350 17 Gram(s) Oral daily PRN Constipation  traMADol 50 milliGRAM(s) Oral every 6 hours PRN Moderate Pain (4 - 6)    ___________  Active diet:  Diet, Regular  ___________________    ==================>> VITAL SIGNS <<==================     Vital Signs Last 24 HrsT(C): 36.5 (08-26-22 @ 04:53)  T(F): 97.7 (08-26-22 @ 04:53), Max: 97.8 (08-25-22 @ 14:17)  HR: 70 (08-26-22 @ 04:53) (64 - 70)  BP: 102/54 (08-26-22 @ 04:53)  RR: 16 (08-26-22 @ 04:53) (16 - 16)  SpO2: 100% (08-26-22 @ 04:53) (100% - 100%)      CAPILLARY BLOOD GLUCOSE  POCT Blood Glucose.: 83 mg/dL (26 Aug 2022 07:53)  POCT Blood Glucose.: 74 mg/dL (25 Aug 2022 21:35)  POCT Blood Glucose.: 92 mg/dL (25 Aug 2022 17:01)  POCT Blood Glucose.: 96 mg/dL (25 Aug 2022 11:41)     ==================>> LAB AND IMAGING <<==================                        9.8    9.02  )-----------( 369      ( 25 Aug 2022 06:25 )             32.6        08-25    137  |  104  |  21<H>  ----------------------------<  78  4.2   |  27  |  1.04    Ca    10.4      25 Aug 2022 06:25      ___________________________________________________________________________________  ===============>>  A S S E S S M E N T   A N D   P L A N <<===============  ------------------------------------------------------------------------------------------    · Assessment	  This is a 56 year old female from home, post menopausal female, with PMH of Anemia, Asthma, Bladder cancer (previously on chemo until 5/2022), R nephrostomy tube, hyperparathyroidism, bradycardia with PPM coming in UTI.     Problem/Plan - 1:  ·  Problem:  complicated UTI in pt with cancer and nephrostomy tube   finished ABx as per ID   nephrostomy change done     pt adamantly asking to check another U/A before DC > will do clean catch : pt understands no plan to treat though !     ** gradually worsening renal function / creatinine and chronic proteinuria , now also with hypernatremia        CRYSTAL on CKD II      nephrology following, appreciated      monitor BMP     encouraged Po hydration      proteinurea : unclear etiology >> monitor for now    Problem/Plan - 2:  ·  Problem: Asthma.   ·  Plan: - Pt has a history of Asthma.   - Will continue pts home medication Symbicort.    Problem/Plan - 3:  ·  Problem: Bladder cancer.   ·  Plan: - Pt has a history of bladder cancer with mets to the liver.   - follows  Dr. Renay Trinidad,  - pt has a PET scan scheduled later this month to monitor progress..     >> pain likely related to cancer and not kidney infection as pt thinks         unfortunately pt declining to have further CT scans, declining to take narcotics for pain mgmt, now ok with seeing pain mgmt              continue tramadol 50mg for now and valium 5 mg PRN muscle spasm     Problem/Plan - 4:  ·  Problem: HLD (hyperlipidemia).   ·  Plan: - Pt has a history of HLD.   - Will continue pts home medication Atorvastatin.    Problem/Plan - 5:  ·  Problem: HTN (hypertension).   ·  Plan: - Pt has a history of HTN.   - Currently not on any medication.   - Continue to monitor.    Problem/Plan - 6:  ·  Problem: Prophylactic measure.   ·  Plan: - Heparin - DVT.  encouraged PO intake     dispo planing home when more stable / pain better managed + BM..  later today     --------------------------------------------  Case discussed with pt, NP  ___________________________  H. NIEVES Ochoa.  Pager: [phone]

## 2022-08-26 NOTE — PROGRESS NOTE ADULT - ASSESSMENT
Patient is a 56y old  Female from home, post menopausal female, with PMH of Anemia, Asthma, Bladder cancer (previously on chemo until 5/2022) with  Dr. Renay Trinidad, R nephrostomy tube, hyperparathyroidism, bradycardia with PPM, now presents to the ER for evaluation of fever and chills at home.  She states that her nephrostomy tubes tends to get clogged often and she has been flushing it constantly.  Also she has been feeling lethargic, having Urinary frequency and abdominal pain. On admission, she has no fever, no Leukocytosis but cloudy urine and positive Urine analysis. She has started on Cefepime and the ID consult requested to assist with further evaluation and antibiotic management.    # Complicated UTI- in the setting of Right nephrostomy tube- Urine cx has no growth-  The right nephrostomy tube is not draining well, patient has to drain manually. She is s/p Right NT changed on 8/22/22, by IR.  # Bilateral hydronephrosis, mild on the right and moderate to severe on the left on Renal Us from 8/18/22  # Bladder cancer     would recommend:    1. Please optimize the bowel regimen   2. Pain management as needed  3. Monitor kidney function and IVF  4. Monitor OFF Abx as she completed the course  5. OOB to chair     d/w patient and Covering NP, Yulianaa    Attending Attestation:    Spent more than 35 minutes on total encounter, more than 50 % of the visit was spent counseling and/or coordinating care by the Attending physician.

## 2022-08-26 NOTE — PROGRESS NOTE ADULT - PROBLEM SELECTOR PROBLEM 1
Acute UTI
Chronic back pain
Acute UTI
Chronic lung disease of prematurity
Acute UTI
Chronic back pain
Acute UTI

## 2022-08-26 NOTE — PROGRESS NOTE ADULT - SUBJECTIVE AND OBJECTIVE BOX
Patient is seen and examined at the bed side, is afebrile. She is c/o constipation and abdominal spasm.      REVIEW OF SYSTEMS: All other review systems are negative      ALLERGIES: No Known Allergies      Vital Signs Last 24 Hrs  T(C): 36.8 (26 Aug 2022 12:44), Max: 36.8 (26 Aug 2022 12:44)  T(F): 98.2 (26 Aug 2022 12:44), Max: 98.2 (26 Aug 2022 12:44)  HR: 76 (26 Aug 2022 12:44) (64 - 76)  BP: 101/74 (26 Aug 2022 12:44) (101/74 - 117/60)  BP(mean): --  RR: 16 (26 Aug 2022 12:44) (16 - 16)  SpO2: 100% (26 Aug 2022 12:44) (100% - 100%)    Parameters below as of 26 Aug 2022 12:44  Patient On (Oxygen Delivery Method): room air        PHYSICAL EXAM:  GENERAL:  Appears uncomfortable due to constipation and abdominal spasms  CHEST/LUNG:  Not using accessory muscles   HEART: s1 and s2 present  ABDOMEN:  Nondistended  : new Right nephrostomy tubae in placed   EXTREMITIES: No pedal  edema  CNS: Awake and Alert      LABS: No new Labs                        9.8    9.02  )-----------( 369      ( 25 Aug 2022 06:25 )             32.6                         9.1    9.05  )-----------( 320      ( 24 Aug 2022 07:41 )             29.5       08-25    137  |  104  |  21<H>  ----------------------------<  78  4.2   |  27  |  1.04    Ca    10.4      25 Aug 2022 06:25    08-23    137  |  106  |  25<H>  ----------------------------<  82  4.5   |  24  |  1.30    Ca    10.5      23 Aug 2022 06:20  Mg     2.6     08-22  TPro  x   /  Alb  2.6<L>  /  TBili  x   /  DBili  x   /  AST  x   /  ALT  x   /  AlkPhos  x   08-20      Urinalysis Basic - ( 16 Aug 2022 21:40 )  Color: Yellow / Appearance: Slightly Turbid / S.015 / pH: x  Gluc: x / Ketone: Negative  / Bili: Negative / Urobili: Negative   Blood: x / Protein: 500 mg/dL / Nitrite: Negative   Leuk Esterase: Moderate / RBC: 10-25 /HPF / WBC 26-50 /HPF   Sq Epi: x / Non Sq Epi: Few /HPF / Bacteria: Moderate /HPF        MEDICATIONS  (STANDING):    atorvastatin 40 milliGRAM(s) Oral at bedtime  budesonide  80 MICROgram(s)/formoterol 4.5 MICROgram(s) Inhaler 2 Puff(s) Inhalation two times a day  dextrose 5%. 1000 milliLiter(s) (70 mL/Hr) IV Continuous <Continuous>  heparin   Injectable 5000 Unit(s) SubCutaneous every 8 hours  OLANZapine 5 milliGRAM(s) Oral at bedtime  ondansetron Injectable 4 milliGRAM(s) IV Push once  pantoprazole    Tablet 40 milliGRAM(s) Oral before breakfast  senna 2 Tablet(s) Oral at bedtime      RADIOLOGY & ADDITIONAL TESTS:    22: US Renal (22 @ 19:21) >Bilateral hydronephrosis, mild on the right and moderate to severe on the   left.  A right nephrostomy tube is present.  4 mm nonobstructive calculus in the right kidney.    2. Complex cysts with the solid components or debris in the left kidney. If clinically indicated, abdominal MR without and with IV contrast may be   pursued for further evaluation.    22: Xray Chest 1 View- PORTABLE-Urgent (22 @ 01:40) Redemonstration of bilateral metastatic lung nodules.      MICROBIOLOGY DATA:  Culture - Urine (08.15.22 @ 19:55)   Specimen Source: Clean Catch Clean Catch (Midstream)   Culture Results: <10,000 CFU/mL Normal Urogenital Vandana     COVID-19 PCR (08.15.22 @ 19:55)   COVID-19 PCR: NotDetec:     Culture - Blood (08.15.22 @ 19:50)   Specimen Source: .Blood Blood-Peripheral   Culture Results: No growth to date.     Culture - Blood (08.15.22 @ 19:45)   Specimen Source: .Blood Blood-Peripheral   Culture Results: No growth to date.

## 2022-08-27 NOTE — PROGRESS NOTE ADULT - SUBJECTIVE AND OBJECTIVE BOX
Patient is seen and examined at the bed side, is afebrile.  The constipation resolved, she is having BMs.       REVIEW OF SYSTEMS: All other review systems are negative      ALLERGIES: No Known Allergies      Vital Signs Last 24 Hrs  T(C): 36.3 (27 Aug 2022 11:22), Max: 36.6 (27 Aug 2022 05:50)  T(F): 97.4 (27 Aug 2022 11:22), Max: 97.9 (27 Aug 2022 05:50)  HR: 70 (27 Aug 2022 11:22) (70 - 74)  BP: 113/55 (27 Aug 2022 11:22) (111/70 - 113/69)  BP(mean): --  RR: 16 (27 Aug 2022 11:22) (16 - 16)  SpO2: 100% (27 Aug 2022 11:22) (100% - 100%)    Parameters below as of 27 Aug 2022 11:22  Patient On (Oxygen Delivery Method): room air          PHYSICAL EXAM:  GENERAL:  Appears uncomfortable due to constipation and abdominal spasms  CHEST/LUNG:  Not using accessory muscles   HEART: s1 and s2 present  ABDOMEN:  Nondistended  : new Right nephrostomy tubae in placed   EXTREMITIES: No pedal  edema  CNS: Awake and Alert      LABS:                         10.7   8.44  )-----------( 362      ( 27 Aug 2022 07:18 )             34.7                           9.8    9.02  )-----------( 369      ( 25 Aug 2022 06:25 )             32.6          08-25    137  |  104  |  21<H>  ----------------------------<  78  4.2   |  27  |  1.04    Ca    10.4      25 Aug 2022 06:25    08-23    137  |  106  |  25<H>  ----------------------------<  82  4.5   |  24  |  1.30    Ca    10.5      23 Aug 2022 06:20  Mg     2.6     08-22  TPro  x   /  Alb  2.6<L>  /  TBili  x   /  DBili  x   /  AST  x   /  ALT  x   /  AlkPhos  x   08-20      Urinalysis Basic - ( 16 Aug 2022 21:40 )  Color: Yellow / Appearance: Slightly Turbid / S.015 / pH: x  Gluc: x / Ketone: Negative  / Bili: Negative / Urobili: Negative   Blood: x / Protein: 500 mg/dL / Nitrite: Negative   Leuk Esterase: Moderate / RBC: 10-25 /HPF / WBC 26-50 /HPF   Sq Epi: x / Non Sq Epi: Few /HPF / Bacteria: Moderate /HPF        MEDICATIONS  (STANDING):    atorvastatin 40 milliGRAM(s) Oral at bedtime  budesonide  80 MICROgram(s)/formoterol 4.5 MICROgram(s) Inhaler 2 Puff(s) Inhalation two times a day  heparin   Injectable 5000 Unit(s) SubCutaneous every 8 hours  OLANZapine 5 milliGRAM(s) Oral at bedtime  ondansetron Injectable 4 milliGRAM(s) IV Push once  pantoprazole    Tablet 40 milliGRAM(s) Oral before breakfast  senna 2 Tablet(s) Oral at bedtime  sodium chloride 0.9%. 1000 milliLiter(s) (60 mL/Hr) IV Continuous <Continuous>      RADIOLOGY & ADDITIONAL TESTS:    22: US Renal (22 @ 19:21) >Bilateral hydronephrosis, mild on the right and moderate to severe on the   left.  A right nephrostomy tube is present.  4 mm nonobstructive calculus in the right kidney.    2. Complex cysts with the solid components or debris in the left kidney. If clinically indicated, abdominal MR without and with IV contrast may be   pursued for further evaluation.    22: Xray Chest 1 View- PORTABLE-Urgent (22 @ 01:40) Redemonstration of bilateral metastatic lung nodules.      MICROBIOLOGY DATA:  Culture - Urine (08.15.22 @ 19:55)   Specimen Source: Clean Catch Clean Catch (Midstream)   Culture Results: <10,000 CFU/mL Normal Urogenital Vandana     COVID-19 PCR (08.15.22 @ 19:55)   COVID-19 PCR: NotDetec:     Culture - Blood (08.15.22 @ 19:50)   Specimen Source: .Blood Blood-Peripheral   Culture Results: No growth to date.     Culture - Blood (08.15.22 @ 19:45)   Specimen Source: .Blood Blood-Peripheral   Culture Results: No growth to date.

## 2022-08-27 NOTE — CHART NOTE - NSCHARTNOTEFT_GEN_A_CORE
56 year old female from home, with PMH of Anemia, Asthma, Bladder cancer (previously on chemo until 5/2022) with  Dr. Renay Trinidad, R nephrostomy tube, hyperparathyroidism, bradycardia with PPM presented with  fevers and chills at home.   She states that her nephrostomy tubes tends to get clogged often and she has been flushing it constantly.     In ED UA positive, creatinine 1.41   Admitted for UTI and CRYSTAL   IR consulted for nephrostomy check     - continue Cefepime  - follow up urine culture   - ID Dr Rodriguez   - continue home medications   - nephrostomy checked by IR (ALEJANDRINA Grimm at bedside)   - Prophylactic measure: Heparin - DVT.        OBJECTIVE:  Vital Signs Last 24 Hrs  T(C): 36.6 (16 Aug 2022 07:30), Max: 36.9 (15 Aug 2022 15:45)  T(F): 97.8 (16 Aug 2022 07:30), Max: 98.5 (15 Aug 2022 15:45)  HR: 67 (16 Aug 2022 07:30) (67 - 85)  BP: 104/64 (16 Aug 2022 07:30) (104/64 - 122/75)  BP(mean): --  RR: 17 (16 Aug 2022 07:30) (17 - 18)  SpO2: 100% (16 Aug 2022 07:30) (98% - 100%)    Parameters below as of 16 Aug 2022 07:30  Patient On (Oxygen Delivery Method): room air        FOCUSED PHYSICAL EXAM:  Neuro: awake, alert, oriented x 3. No neuro deficit  Cardiovascular: Pulses +2 B/L in lower and upper extremities, HR regular, BP stable, No edema.  Respiratory: Respirations regular, unlabored, breath sounds clear B/L.   GI: Abdomen soft, non-tender, positive bowel sounds.  : no bladder distention noted. Right nphrostomy tube in place draining   Skin: Dry, intact, no bruising, no diaphoresis.    I&O's      LABS:                        9.4    8.10  )-----------( 271      ( 16 Aug 2022 06:05 )             30.5     08-16    140  |  111<H>  |  27<H>  ----------------------------<  95  3.9   |  22  |  1.38<H>    Ca    9.6      16 Aug 2022 06:05  Phos  3.6     08-16  Mg     2.5     08-16    TPro  8.1  /  Alb  2.6<L>  /  TBili  0.2  /  DBili  x   /  AST  14  /  ALT  14  /  AlkPhos  93  08-15      ASSESSMENT and PLAN:     Admitted for UTI and CRYSTAL     - continue Cefepime  - follow up urine culture   - ID Dr Rodriguez   - continue home medications   - monitor creatinine and electrolytes   - nephrostomy checked by IR (ALEJANDRINA Grimm at bedside)   - Prophylactic measure: Heparin - DVT.
Assessment:   56yFemalePatient is a 56y old  Female who presents with a chief complaint of UTI (22 Aug 2022 16:57). Pt visited. Pt asleep. S/P Nephrostomy tube exchange.  D/W NSg Pt appetite Remains Fair. Labs Noted . C/O Hypoglycemia 53 corrected to 100. Pt is picky eater.      Factors impacting intake: [ ] none [ ] nausea  [ ] vomiting [ ] diarrhea [ ] constipation  [ ]chewing problems [ ] swallowing issues  [ ] other:     Diet Prescription: Diet, Regular (08-16-22 @ 02:34)    Intake: ~50 %     Current Weight:   % Weight Change Bed scale 92 LB     Pertinent Medications: MEDICATIONS  (STANDING):  atorvastatin 40 milliGRAM(s) Oral at bedtime  budesonide  80 MICROgram(s)/formoterol 4.5 MICROgram(s) Inhaler 2 Puff(s) Inhalation two times a day  dextrose 5%. 1000 milliLiter(s) (70 mL/Hr) IV Continuous <Continuous>  heparin   Injectable 5000 Unit(s) SubCutaneous every 8 hours  OLANZapine 5 milliGRAM(s) Oral at bedtime  ondansetron Injectable 4 milliGRAM(s) IV Push once  pantoprazole    Tablet 40 milliGRAM(s) Oral before breakfast    MEDICATIONS  (PRN):  acetaminophen     Tablet .. 650 milliGRAM(s) Oral every 6 hours PRN Temp greater or equal to 38C (100.4F), Moderate Pain (4 - 6)  aluminum hydroxide/magnesium hydroxide/simethicone Suspension 30 milliLiter(s) Oral every 4 hours PRN Dyspepsia  melatonin 3 milliGRAM(s) Oral at bedtime PRN Insomnia  polyethylene glycol 3350 17 Gram(s) Oral daily PRN Constipation    Pertinent Labs: 08-22 Na141 mmol/L Glu 87 mg/dL K+ 4.4 mmol/L Cr  1.35 mg/dL<H> BUN 42 mg/dL<H> 08-20 Phos 3.1 mg/dL 08-20 Alb 2.6 g/dL<L>     CAPILLARY BLOOD GLUCOSE      POCT Blood Glucose.: 91 mg/dL (22 Aug 2022 16:46)  POCT Blood Glucose.: 100 mg/dL (22 Aug 2022 11:12)  POCT Blood Glucose.: 53 mg/dL (22 Aug 2022 10:38)  POCT Blood Glucose.: 82 mg/dL (22 Aug 2022 07:46)  POCT Blood Glucose.: 94 mg/dL (21 Aug 2022 21:25)    Skin: Skin Intact     Estimated Needs:   [ ] no change since previous assessment  [ ] recalculated:     Previous Nutrition Diagnosis:   [ ] Inadequate Energy Intake [ ]Inadequate Oral Intake [ ] Excessive Energy Intake   [ ] Underweight [ ] Increased Nutrient Needs [ ] Overweight/Obesity   [ ] Altered GI Function [ ] Unintended Weight Loss [ ] Food & Nutrition Related Knowledge Deficit [x ] Malnutrition  Severe    Nutrition Diagnosis is [x ] ongoing  [ ] resolved [ ] not applicable     New Nutrition Diagnosis: [ ] not applicable       Interventions:   Recommend  [ ] Change Diet To:  [x ] Nutrition Supplement Ensure Plant Based BID.   [ ] Nutrition Support  [x ] Other: Encourage PO  Intake     Monitoring and Evaluation:   [ ] PO intake [ x ] Tolerance to diet prescription [ x ] weights [ x ] labs[ x ] follow up per protocol  [ ] other:
EVENT : Pt seen to f/u overnight chest pain . EKG NSR , no changes from previous. Cardiac enzymes and Trop neg.     SUBJECTIVE:Pt seen this morning. Pt endorses chest pain last night , none now. Having back pain , and non-bloody diarrhea this morning. Pt endorses she had some stool softeners for constipation due to Tramadol . Tolerating po.  Pt requests Tylenol for back pain, and prefers to avoid Tramadol  to avoid constipation. Pt endorses she had some bleeding overnight from vagina , passed some clots in toilet.  Pt adds she is feeling weak and wants to be stronger before going home.   Pt requests to see  attending   Right nephrostomy tube to leg bag , draining clear yellow urine .    OBJECTIVE:  Vital Signs Last 24 Hrs  T(C): 36.3 (27 Aug 2022 11:22), Max: 36.6 (27 Aug 2022 05:50)  T(F): 97.4 (27 Aug 2022 11:22), Max: 97.9 (27 Aug 2022 05:50)  HR: 70 (27 Aug 2022 11:22) (70 - 74)  BP: 113/55 (27 Aug 2022 11:22) (111/70 - 113/69)  BP(mean): --  RR: 16 (27 Aug 2022 11:22) (16 - 16)  SpO2: 100% (27 Aug 2022 11:22) (100% - 100%)    Parameters below as of 27 Aug 2022 11:22  Patient On (Oxygen Delivery Method): room air            LABS:                        10.7   8.44  )-----------( 362      ( 27 Aug 2022 07:18 )             34.7   CARDIAC MARKERS ( 26 Aug 2022 22:10 )  x     / x     / 37 U/L / x     / <1.0 ng/mL          EKG:     < from: 12 Lead ECG (08.16.22 @ 02:07) >    Ventricular Rate 67 BPM    Atrial Rate 67 BPM    P-R Interval 142 ms    QRS Duration 80 ms    Q-T Interval 394 ms    QTC Calculation(Bazett) 416 ms    P Axis 84 degrees    R Axis 77 degrees    T Axis 69 degrees    Diagnosis Line Normal sinus rhythm  Possible Left atrial enlargement  Borderline ECG    < end of copied text >            IMAGING:  < from: US Renal (08.18.22 @ 19:21) >    IMPRESSION:  Bilateral hydronephrosis, mild on the right and moderate to severe on the   left.    A right nephrostomy tube is present.    4 mm nonobstructive calculus in the right kidney.    2. Complex cysts with the solid components or debris in the left kidney.   If clinically indicated, abdominal MR without and with IV contrast may be   pursued for further evaluation.    < end of copied text >        ASSESSMENT:  HPI:  This is a 56 year old female from home, post menopausal female, with PMH of Anemia, Asthma, Bladder cancer (previously on chemo until 5/2022) with  Dr. Renay Trinidad, R nephrostomy tube, hyperparathyroidism, bradycardia with PPM coming in for fevers and chills at home.   She states that her nephrostomy tubes tends to get clogged often and she has been flushing it constantly. Since yesterday she has been feeling lethargic, with chills and abdominal pain. She states in the past similar symptoms occurred when she was found to have UTI.   She denies any headaches, visual disturbances, dizziness, falls, chest pain, palpitations, lower extremity swelling, fevers, skin rash, recent travel, or sick contacts   (16 Aug 2022 02:35)    Pt admitted for UTI. Completed course of abx. s/p nephrostomy change in IR 8/22/22.  Now with acute on chronic back pain, diarrhea and c/o vaginal bleeding         PLAN:   1: acute on chronic back pain: likely due to UTI .       Completed abx.      Continue Tylenol and warm alternating with cool pack prn.   2. dehydration     due to Diarrhea 2/2 bowel regimen.       Bowel regimen prn       Continue hydration .       Will start on IVF x 10 hrs.        Check BMP          3. Vaginal bleeding             Monitor for further vaginal bleeding.       Monitor CBC  and transfuse if Hgb <7.0      Outpt f/u    D/w Dr. Sewell.       Genet Lira, NP Medicine  [phone]
EVENT:   8/26/22, 9:32pm, patient c/o chest pain. Stat EKG showed NSR, possible left atrial enlargement. Stat Trop., CK, CK MB and BNP - WNL. Tylenol - given. On  bedside assessment - patient was sleeping.   8/27/22, 3am, patient stated that she left her hemoglobin dropped. She requested morning CBC.   HPI:       56 year old female from home, post menopausal female, with PMH of Anemia, Asthma, Bladder cancer (previously on chemo until 5/2022) with  Dr. Renay Trinidad, R nephrostomy tube, hyperparathyroidism, bradycardia with PPM coming in for fevers and chills at home.   She states that her nephrostomy tubes tends to get clogged often and she has been flushing it constantly. Since yesterday she has been feeling lethargic, with chills and abdominal pain. She states in the past similar symptoms occurred when she was found to have UTI.   She denies any headaches, visual disturbances, dizziness, falls, chest pain, palpitations, lower extremity swelling, fevers, skin rash, recent travel, or sick contacts   (16 Aug 2022 02:35)    OBJECTIVE:  Vital Signs Last 24 Hrs  T(C): 36.5 (26 Aug 2022 21:24), Max: 36.8 (26 Aug 2022 12:44)  T(F): 97.7 (26 Aug 2022 21:24), Max: 98.2 (26 Aug 2022 12:44)  HR: 74 (26 Aug 2022 21:24) (70 - 76)  BP: 111/70 (26 Aug 2022 21:24) (101/74 - 111/70)  BP(mean): --  RR: 16 (26 Aug 2022 21:24) (16 - 16)  SpO2: 100% (26 Aug 2022 21:24) (100% - 100%)    Parameters below as of 26 Aug 2022 21:24  Patient On (Oxygen Delivery Method): room air        FOCUSED PHYSICAL EXAM:    LABS:                        9.8    9.02  )-----------( 369      ( 25 Aug 2022 06:25 )             32.6   CARDIAC MARKERS ( 26 Aug 2022 22:10 )  x     / x     / 37 U/L / x     / <1.0 ng/mL    08-25    137  |  104  |  21<H>  ----------------------------<  78  4.2   |  27  |  1.04    Ca    10.4      25 Aug 2022 06:25    PLAN:   - Stat : EKG, Trop, CK, CK MB, BNP,  - CBC - in am.     FOLLOW UP / RESULT:   - Follow-up morning labs.
56 year old female from home, post menopausal female, with PMH of Anemia, Asthma, Bladder cancer (previously on chemo until 5/2022) with  Dr. Renay Trinidad, R nephrostomy tube, hyperparathyroidism, bradycardia with PPM coming in for fevers and chills at home. Admitted for UTI. UA positive started on Abx, cefepime. Dr Jennifer CALIX consulted. renal US- b/l hydronephrosis, rt kidney stone and nephrostomy tube and left cyst. Pt. also noted to have CRYSTAL, s/p IVF bolus, worsening kidney fnx- Dr. Murcia consulted- nephro.     Ir consulted for nephrostomy tube- plan to change tube 8/22/22 under anesthesia. Please call to make sure anesthesia is available for procedure.

## 2022-08-27 NOTE — CHART NOTE - NSCHARTNOTESELECT_GEN_ALL_CORE
IR Proc NEEDS Anesthesia/Event Note
"feeling dry"/Event Note
Event Note
Nutrition Services
Nutrition Services

## 2022-08-27 NOTE — PROGRESS NOTE ADULT - ASSESSMENT
Patient is a 56y old  Female from home, post menopausal female, with PMH of Anemia, Asthma, Bladder cancer (previously on chemo until 5/2022) with  Dr. Renay Trinidad, R nephrostomy tube, hyperparathyroidism, bradycardia with PPM, now presents to the ER for evaluation of fever and chills at home.  She states that her nephrostomy tubes tends to get clogged often and she has been flushing it constantly.  Also she has been feeling lethargic, having Urinary frequency and abdominal pain. On admission, she has no fever, no Leukocytosis but cloudy urine and positive Urine analysis. She has started on Cefepime and the ID consult requested to assist with further evaluation and antibiotic management.    # Complicated UTI- in the setting of Right nephrostomy tube- Urine cx has no growth-  The right nephrostomy tube is not draining well, patient has to drain manually. She is s/p Right NT changed on 8/22/22, by IR.  # Bilateral hydronephrosis, mild on the right and moderate to severe on the left on Renal Us from 8/18/22  # Bladder cancer     would recommend:    1. OOB to chair   2. Pain management as needed  3. Monitor kidney function and IVF  4. Monitor OFF Abx as she completed the course    d/w patient and nursing staff     Attending Attestation:    Spent more than 35 minutes on total encounter, more than 50 % of the visit was spent counseling and/or coordinating care by the Attending physician.

## 2022-08-28 NOTE — PROGRESS NOTE ADULT - SUBJECTIVE AND OBJECTIVE BOX
Patient is seen and examined at the bed side, is afebrile. She mentioned feeling better today.      REVIEW OF SYSTEMS: All other review systems are negative      ALLERGIES: No Known Allergies      Vital Signs Last 24 Hrs  T(C): 36.3 (28 Aug 2022 12:33), Max: 36.8 (28 Aug 2022 05:20)  T(F): 97.3 (28 Aug 2022 12:33), Max: 98.3 (28 Aug 2022 05:20)  HR: 74 (28 Aug 2022 12:33) (70 - 78)  BP: 123/54 (28 Aug 2022 12:33) (112/10 - 123/54)  BP(mean): --  RR: 16 (28 Aug 2022 12:33) (16 - 17)  SpO2: 100% (28 Aug 2022 12:) (100% - 100%)    Parameters below as of 28 Aug 2022 12:33  Patient On (Oxygen Delivery Method): room air        PHYSICAL EXAM:  GENERAL: Not in distress  CHEST/LUNG:  Not using accessory muscles   HEART: s1 and s2 present  ABDOMEN:  Nondistended  : new Right nephrostomy tubae in placed   EXTREMITIES: No pedal  edema  CNS: Awake and Alert      LABS:                                    9.2    8.82  )-----------( 314      ( 28 Aug 2022 07:57 )             30.2                  10.7   8.44  )-----------( 362      ( 27 Aug 2022 07:18 )             34.7       08-28    138  |  103  |  15  ----------------------------<  78  4.3   |  26  |  1.02    Ca    10.4      28 Aug 2022 07:57    08-23    137  |  106  |  25<H>  ----------------------------<  82  4.5   |  24  |  1.30    Ca    10.5      23 Aug 2022 06:20  Mg     2.6     08-22  TPro  x   /  Alb  2.6<L>  /  TBili  x   /  DBili  x   /  AST  x   /  ALT  x   /  AlkPhos  x   08-20      Urinalysis Basic - ( 16 Aug 2022 21:40 )  Color: Yellow / Appearance: Slightly Turbid / S.015 / pH: x  Gluc: x / Ketone: Negative  / Bili: Negative / Urobili: Negative   Blood: x / Protein: 500 mg/dL / Nitrite: Negative   Leuk Esterase: Moderate / RBC: 10-25 /HPF / WBC 26-50 /HPF   Sq Epi: x / Non Sq Epi: Few /HPF / Bacteria: Moderate /HPF        MEDICATIONS  (STANDING):    atorvastatin 40 milliGRAM(s) Oral at bedtime  budesonide  80 MICROgram(s)/formoterol 4.5 MICROgram(s) Inhaler 2 Puff(s) Inhalation two times a day  heparin   Injectable 5000 Unit(s) SubCutaneous every 8 hours  OLANZapine 5 milliGRAM(s) Oral at bedtime  ondansetron Injectable 4 milliGRAM(s) IV Push once  pantoprazole    Tablet 40 milliGRAM(s) Oral before breakfast  senna 2 Tablet(s) Oral at bedtime  sodium chloride 0.9%. 1000 milliLiter(s) (60 mL/Hr) IV Continuous <Continuous>      RADIOLOGY & ADDITIONAL TESTS:    22: US Renal (22 @ 19:21) >Bilateral hydronephrosis, mild on the right and moderate to severe on the   left.  A right nephrostomy tube is present.  4 mm nonobstructive calculus in the right kidney.    2. Complex cysts with the solid components or debris in the left kidney. If clinically indicated, abdominal MR without and with IV contrast may be   pursued for further evaluation.    22: Xray Chest 1 View- PORTABLE-Urgent (22 @ 01:40) Redemonstration of bilateral metastatic lung nodules.      MICROBIOLOGY DATA:  Culture - Urine (08.15.22 @ 19:55)   Specimen Source: Clean Catch Clean Catch (Midstream)   Culture Results: <10,000 CFU/mL Normal Urogenital Vandana     COVID-19 PCR (08.15.22 @ 19:55)   COVID-19 PCR: NotDetec:     Culture - Blood (08.15.22 @ 19:50)   Specimen Source: .Blood Blood-Peripheral   Culture Results: No growth to date.     Culture - Blood (08.15.22 @ 19:45)   Specimen Source: .Blood Blood-Peripheral   Culture Results: No growth to date.

## 2022-08-28 NOTE — PROGRESS NOTE ADULT - ASSESSMENT
_________________________________________________________________________________________  ========>>  M E D I C A L   A T T E N D I N G    F O L L O W  U P  N O T E  <<=========  -----------------------------------------------------------------------------------------------------    - Patient seen and examined by me earlier today.   - In summary,  JEF HOUSE is a 56y year old woman admitted with pain in back., possible UTI   - Patient was discharged on Friday , but she is still in hospital..       pt reportedly had asked/ accepted some bowel regimen and documented and developed diarrhea, several episodes.. along  with reported blood via bladder  has had on and off due to cancer) and some vaginal bleeding ( which has subsided now) ...     pt overall feels better, eating well,  pain controlled, ..     ==================>> REVIEW OF SYSTEM <<=================    GEN: no fever, no chills, as above as above   RESP: no SOB, no cough, no sputum  CVS: no chest pain, no palpitations, no edema  GI: no abdominal pain, no nausea, as above   : no dysuria, no frequency, no hematuria.. nephrostomy seems to be draining well ( states was clogged yesterday !)  Neuro: no headache, no dizziness  Derm : no itching, no rash    ==================>> PHYSICAL EXAM <<=================    GEN: A&O X 3 , NAD , more comfortable, calm   HEENT: NCAT, PERRL, MMM, hearing intact, temporal wasting   Neck: supple , no JVD appreciated  CVS: S1S2 , regular , No M/R/G appreciated  PULM: CTA B/L,  no W/R/R appreciated  ABD.: soft. non tender, non distended,  bowel sounds present  Extrem: intact pulses , no edema   PSYCH : normal mood,  not anxious, as above                              ( Note written / Date of service 08-28-22 )    ==================>> MEDICATIONS <<====================    atorvastatin 40 milliGRAM(s) Oral at bedtime  budesonide  80 MICROgram(s)/formoterol 4.5 MICROgram(s) Inhaler 2 Puff(s) Inhalation two times a day  heparin   Injectable 5000 Unit(s) SubCutaneous every 8 hours  OLANZapine 5 milliGRAM(s) Oral at bedtime  ondansetron Injectable 4 milliGRAM(s) IV Push once  pantoprazole    Tablet 40 milliGRAM(s) Oral before breakfast  senna 2 Tablet(s) Oral at bedtime  sodium chloride 0.9%. 1000 milliLiter(s) IV Continuous <Continuous>    MEDICATIONS  (PRN):  acetaminophen     Tablet .. 650 milliGRAM(s) Oral every 6 hours PRN Temp greater or equal to 38C (100.4F), Mild Pain (1 - 3)  aluminum hydroxide/magnesium hydroxide/simethicone Suspension 30 milliLiter(s) Oral every 4 hours PRN Dyspepsia  bisacodyl 5 milliGRAM(s) Oral every 12 hours PRN Constipation  diazepam    Tablet 5 milliGRAM(s) Oral three times a day PRN back spasm  melatonin 3 milliGRAM(s) Oral at bedtime PRN Insomnia  polyethylene glycol 3350 17 Gram(s) Oral daily PRN Constipation  traMADol 50 milliGRAM(s) Oral every 6 hours PRN Moderate Pain (4 - 6)    ___________  Active diet:  Diet, Regular  ___________________    ==================>> VITAL SIGNS <<==================    Vital Signs Last 24 HrsT(C): 36.8 (08-28-22 @ 05:20)  T(F): 98.3 (08-28-22 @ 05:20), Max: 98.3 (08-28-22 @ 05:20)  HR: 70 (08-28-22 @ 05:20) (70 - 78)  BP: 112/10 (08-28-22 @ 05:20)  RR: 17 (08-28-22 @ 05:20) (16 - 17)  SpO2: 100% (08-28-22 @ 05:20) (100% - 100%)      CAPILLARY BLOOD GLUCOSE  POCT Blood Glucose.: 91 mg/dL (28 Aug 2022 08:27)  POCT Blood Glucose.: 69 mg/dL (28 Aug 2022 07:59)  POCT Blood Glucose.: 100 mg/dL (27 Aug 2022 21:52)  POCT Blood Glucose.: 97 mg/dL (27 Aug 2022 16:52)  POCT Blood Glucose.: 90 mg/dL (27 Aug 2022 11:39)     ==================>> LAB AND IMAGING <<==================                        9.2    8.82  )-----------( 314      ( 28 Aug 2022 07:57 )             30.2        08-28    138  |  103  |  15  ----------------------------<  78  4.3   |  26  |  1.02    Ca    10.4      28 Aug 2022 07:57      WBC count:   8.82 <<== ,  8.44 <<== ,  9.02 <<== ,  9.05 <<==   Hemoglobin:   9.2 <<==,  10.7 <<==,  9.8 <<==,  9.1 <<==  platelets:  314 <==, 362 <==, 369 <==, 320 <==, 397 <==    Creatinine:  1.02  <<==, 1.04  <<==, 1.11  <<==, 1.30  <<==  Sodium:   138  <==, 137  <==, 138  <==, 137  <==    ___________________________________________________________________________________  ===============>>  A S S E S S M E N T   A N D   P L A N <<===============  ------------------------------------------------------------------------------------------    · Assessment	  This is a 56 year old female from home, post menopausal female, with PMH of Anemia, Asthma, Bladder cancer (previously on chemo until 5/2022), R nephrostomy tube, hyperparathyroidism, bradycardia with PPM coming in UTI.     Problem/Plan - 1:  ·  Problem:  complicated UTI in pt with cancer and nephrostomy tube   finished ABx as per ID   nephrostomy change done     repeat U/A noted > no furthe treatment needed, ID in agreement     ** gradually worsening renal function / creatinine and chronic proteinuria , now also with hypernatremia        CRYSTAL on CKD II      nephrology following, appreciated      monitor BMP >> improved  / resolved >> encouraged PO hydration      encouraged Po hydration      proteinurea : unclear etiology >> monitor for now    Problem/Plan - 2:  ·  Problem: Asthma.   ·  Plan: - Pt has a history of Asthma.   - Will continue pts home medication Symbicort.    Problem/Plan - 3:  ·  Problem: Bladder cancer.   ·  Plan: - Pt has a history of bladder cancer with mets to the liver.   - follows  Dr. Renay Trinidad,  - pt has a PET scan scheduled later this month to monitor progress..     >> pain likely related to cancer and not kidney infection as pt thinks         unfortunately pt declining to have further CT scans, declining to take narcotics for pain mgmt, now ok with seeing pain mgmt              continue tramadol 50mg for now and valium 5 mg PRN muscle spasm   bowel regimen as needed    Problem/Plan - 4:  ·  Problem: HLD (hyperlipidemia).   ·  Plan: - Pt has a history of HLD.   - Will continue pts home medication Atorvastatin.    Problem/Plan - 5:  ·  Problem: HTN (hypertension).   ·  Plan: - Pt has a history of HTN.   - Currently not on any medication.   - Continue to monitor.    Problem/Plan - 6:  ·  Problem: Prophylactic measure.   ·  Plan: - Heparin - DVT.  encouraged PO intake     dispo planing home      pt has a PET scan coming up soon >> emphasized importance of not missing the test     --------------------------------------------  Case discussed with pt  ___________________________  H. NIEVES Ochoa.  Pager: [phone]

## 2022-08-28 NOTE — PROGRESS NOTE ADULT - ASSESSMENT
Patient is a 56y old  Female from home, post menopausal female, with PMH of Anemia, Asthma, Bladder cancer (previously on chemo until 5/2022) with  Dr. Renay Trinidad, R nephrostomy tube, hyperparathyroidism, bradycardia with PPM, now presents to the ER for evaluation of fever and chills at home.  She states that her nephrostomy tubes tends to get clogged often and she has been flushing it constantly.  Also she has been feeling lethargic, having Urinary frequency and abdominal pain. On admission, she has no fever, no Leukocytosis but cloudy urine and positive Urine analysis. She has started on Cefepime and the ID consult requested to assist with further evaluation and antibiotic management.    # Complicated UTI- in the setting of Right nephrostomy tube- Urine cx has no growth-  The right nephrostomy tube is not draining well, patient has to drain manually. She is s/p Right NT changed on 8/22/22, by IR.  # Bilateral hydronephrosis, mild on the right and moderate to severe on the left on Renal Us from 8/18/22  # Bladder cancer     would recommend:    1. Monitor OFF Abx as she completed the course   2. Pain management as needed  3. Monitor kidney function and IVF  4. OOB to chair     d/w patient and nursing staff     Attending Attestation:    Spent more than 35 minutes on total encounter, more than 50 % of the visit was spent counseling and/or coordinating care by the Attending physician.

## 2022-08-29 NOTE — PROGRESS NOTE ADULT - REASON FOR ADMISSION
UTI

## 2022-08-29 NOTE — DISCHARGE NOTE NURSING/CASE MANAGEMENT/SOCIAL WORK - NSDCPEFALRISK_GEN_ALL_CORE
For information on Fall & Injury Prevention, visit: [email or web address] OR  [email or web address] OR  https://www.cdc.gov/steadi/patient.html

## 2022-08-29 NOTE — DISCHARGE NOTE NURSING/CASE MANAGEMENT/SOCIAL WORK - PATIENT PORTAL LINK FT
You can access the FollowMyHealth Patient Portal offered by [facility] by registering at the following website: [email or web address]. By joining [employer]’s FollowMyHealth portal, you will also be able to view your health information using other applications (apps) compatible with our system.

## 2022-08-29 NOTE — PROGRESS NOTE ADULT - PROVIDER SPECIALTY LIST ADULT
Infectious Disease
Internal Medicine
Nephrology
Infectious Disease
Internal Medicine
Nephrology
Infectious Disease
Internal Medicine
Intervent Radiology
Infectious Disease
Internal Medicine
Pain Medicine
Internal Medicine
Pain Medicine
Internal Medicine
Internal Medicine

## 2022-08-29 NOTE — PROGRESS NOTE ADULT - ASSESSMENT
M E D I C A L   A T T E N D I N G    F O L L O W    U P   N O T E  (08-29-22 )                                     ------------------------------------------------------------------------------------------------    patient evaluated by me, case discussed with team, chart, medications, and physical exam reviewed, labs / tests  and vitals reviewed by me, as bellow.   Patient is stable for discharge today.  Patient to follow up with  PMD, oncology  See discharge document for full note.  Greater than 35 min spent for these services.                              ( note written / Date of service  08-29-22 )    ==================>> MEDICATIONS <<====================    atorvastatin 40 milliGRAM(s) Oral at bedtime  budesonide  80 MICROgram(s)/formoterol 4.5 MICROgram(s) Inhaler 2 Puff(s) Inhalation two times a day  heparin   Injectable 5000 Unit(s) SubCutaneous every 8 hours  OLANZapine 5 milliGRAM(s) Oral at bedtime  ondansetron Injectable 4 milliGRAM(s) IV Push once  pantoprazole    Tablet 40 milliGRAM(s) Oral before breakfast  senna 2 Tablet(s) Oral at bedtime  sodium chloride 0.9%. 1000 milliLiter(s) IV Continuous <Continuous>    MEDICATIONS  (PRN):  acetaminophen     Tablet .. 650 milliGRAM(s) Oral every 6 hours PRN Temp greater or equal to 38C (100.4F), Mild Pain (1 - 3)  aluminum hydroxide/magnesium hydroxide/simethicone Suspension 30 milliLiter(s) Oral every 4 hours PRN Dyspepsia  bisacodyl 5 milliGRAM(s) Oral every 12 hours PRN Constipation  diazepam    Tablet 5 milliGRAM(s) Oral three times a day PRN back spasm  melatonin 3 milliGRAM(s) Oral at bedtime PRN Insomnia  polyethylene glycol 3350 17 Gram(s) Oral daily PRN Constipation  traMADol 50 milliGRAM(s) Oral every 6 hours PRN Moderate Pain (4 - 6)    ___________  Active diet:  Diet, Regular  ___________________    ==================>> VITAL SIGNS <<==================    T(C): 36.5 (08-29-22 @ 12:45), Max: 37.2 (08-29-22 @ 07:27)  HR: 68 (08-29-22 @ 12:45) (66 - 80)  BP: 109/58 (08-29-22 @ 12:45) (99/58 - 117/60)  RR: 16 (08-29-22 @ 12:45) (16 - 19)  SpO2: 98% (08-29-22 @ 12:45) (98% - 100%)     CAPILLARY BLOOD GLUCOSE  POCT Blood Glucose.: 78 mg/dL (29 Aug 2022 11:52)  POCT Blood Glucose.: 77 mg/dL (29 Aug 2022 08:13)  POCT Blood Glucose.: 74 mg/dL (29 Aug 2022 02:05)  POCT Blood Glucose.: 94 mg/dL (28 Aug 2022 21:19)  POCT Blood Glucose.: 97 mg/dL (28 Aug 2022 16:41)    I&O's Summary    28 Aug 2022 07:01  -  29 Aug 2022 07:00  --------------------------------------------------------  IN: 0 mL / OUT: 1100 mL / NET: -1100 mL       ==================>> LAB AND IMAGING <<==================                        9.2    8.82  )-----------( 314      ( 28 Aug 2022 07:57 )             30.2        08-28    138  |  103  |  15  ----------------------------<  78  4.3   |  26  |  1.02    Ca    10.4      28 Aug 2022 07:57                  HgA1C:   (06-29-22)          (06-29-22)      5.6,   (06-28-22)          (06-28-22)      5.7    WBC count:   8.82 <<== ,  8.44 <<== ,  9.02 <<==   Hemoglobin:   9.2 <<==,  10.7 <<==,  9.8 <<==  platelets:  314 <==, 362 <==, 369 <==, 320 <==    Creatinine:  1.02  <<==, 1.04  <<==, 1.11  <<==  Sodium:   138  <==, 137  <==, 138  <==

## 2022-09-19 PROBLEM — Q82.5 CONGENITAL NON-NEOPLASTIC NEVUS: Chronic | Status: ACTIVE | Noted: 2022-01-01

## 2022-09-19 NOTE — ED ADULT NURSE NOTE - NSICDXPASTMEDICALHX_GEN_ALL_CORE_FT
PAST MEDICAL HISTORY:  Anemia     Asthma     Birthmark     Bladder cancer     Bradycardia     History of renal calculi     HLD (hyperlipidemia)     HTN (hypertension)     Hydronephrosis has right Nephrostomy tube - dressing intact    Liver cyst     Pacemaker [employer]    Parathyroid tumor      no precautions noted

## 2022-09-19 NOTE — ED PROVIDER NOTE - PROGRESS NOTE DETAILS
ruvalcaba: work up shows no acute findings. no acute anemia. pending ct. pt refusing to give urine from the right nephrostomy tube bc she was treated once by our hospital for an infection that " wasn't a true infection". informed pt that right nephrostomy tube does not have a direct link to where the natural passage of urine is and that we need to collect urine from nephrostomy tube and from the natural passage. pt refused. states bc of the " overdose of rocephin 2g instead of 1g" pt developed the diarrhea. pt hasn't had any diarrhea in ed. pt understand the r/b/a. Wilson: pt refusing to have ct with IV contrast bc she was told she can't have contrast due to a blocked kidney. pt informed reasons why a ct with iv contrast is needed. pt states she is a cancer patient and won't get it with contrast. informed she could have a clot in lungs but still refuses. pt again understand the r/b/a. will perform non-con. Wilson: pending ct and ua, if neg can dc. s/o to dr simon Lynn: pt received at sign-out, seen and evaluated at bedside.  CT without acute pathology, pt reports persistent flank pain, nephrostomy tube with hematuria. Discussed with Dr. Ochoa, accepted for admission. Signout given to MAR.

## 2022-09-19 NOTE — ED PROVIDER NOTE - OBJECTIVE STATEMENT
56 yr old female with hx of Anemia, Asthma, Bladder cancer (previously on chemo until 5/2022) mets to lungs with  Dr. Renay Trinidad, R nephrostomy tube, hyperparathyroidism, bradycardia with PPM presents to c/o substernal pain x 2 days and today with hematuria. pt was recently treated with rocephin iv but was told she received too much (2g instead of 1g) causing diarrhea. no fever, no trauma, no cough.

## 2022-09-19 NOTE — ED PROVIDER NOTE - CLINICAL SUMMARY MEDICAL DECISION MAKING FREE TEXT BOX
56 yr old female with hx of Anemia, Asthma, Bladder cancer (previously on chemo until 5/2022) mets to lungs with  Dr. Renay Trinidad, R nephrostomy tube, hyperparathyroidism, bradycardia with PPM presents to c/o substernal pain x 2 days and today with hematuria. pt was recently treated with rocephin iv but was told she received too much (2g instead of 1g) causing diarrhea. no fever, no trauma, no cough.     possibly PE vs anemia vs atypical acs vs pleural effusion vs worsening mets vs anxiety induced. also r/o uti - labs, ct, fluids, ekg, re-assess

## 2022-09-19 NOTE — ED PROVIDER NOTE - NSICDXPASTMEDICALHX_GEN_ALL_CORE_FT
PAST MEDICAL HISTORY:  Anemia     Asthma     Birthmark     Bladder cancer     Bradycardia     History of renal calculi     HLD (hyperlipidemia)     HTN (hypertension)     Hydronephrosis has right Nephrostomy tube - dressing intact    Liver cyst     Pacemaker [facility]    Parathyroid tumor

## 2022-09-19 NOTE — ED ADULT NURSE NOTE - NSIMPLEMENTINTERV_GEN_ALL_ED
Implemented All Universal Safety Interventions:  [place] to call system. Call bell, personal items and telephone within reach. Instruct patient to call for assistance. Room bathroom lighting operational. Non-slip footwear when patient is off stretcher. Physically safe environment: no spills, clutter or unnecessary equipment. Stretcher in lowest position, wheels locked, appropriate side rails in place.

## 2022-09-19 NOTE — ED ADULT TRIAGE NOTE - CHIEF COMPLAINT QUOTE
c/o chest pain for 2 days, noted with blood in  r sided nephrostomy tube ca bladder with mets to the lungs

## 2022-09-19 NOTE — ED PROVIDER NOTE - NS ED MD DISPO ISOLATION TYPES
HR=78 bpm, NIEF=917/76 mmhg, SpO2=97.0 %, Resp=14 B/min, EtCO2=36 mmHg, Pain=0, Mariela=10, Crowder=2 None

## 2022-09-20 NOTE — H&P ADULT - ASSESSMENT
56 year old female from home, with PMH of Anemia, Asthma, Bladder cancer (previously on chemo until 5/2022) with  Dr. Renay Trinidad, R nephrostomy tube, hyperparathyroidism, bradycardia with PPM coming in for chest pain, hematuria and diarrhea. Admitted for UTI

## 2022-09-20 NOTE — H&P ADULT - NSHPSOURCEINFORD_GEN_ALL_CORE
Chart(s)/Patient Pt is told the likelihood of having corona virus is high risk  base on symptoms. And to self quarantine for 14 days. Take tylenols prn for fever or pain, albuterol prn for chest tightness, zpack is use for clinical trail currently for treatement of covid19.

## 2022-09-20 NOTE — H&P ADULT - NSHPPHYSICALEXAM_GEN_ALL_CORE
LOS:     VITALS:   T(C): 36.8 (09-20-22 @ 03:02), Max: 36.8 (09-20-22 @ 03:02)  HR: 66 (09-20-22 @ 03:02) (66 - 70)  BP: 113/70 (09-20-22 @ 03:02) (113/70 - 144/66)  RR: 16 (09-20-22 @ 03:02) (16 - 16)  SpO2: 99% (09-20-22 @ 03:02) (98% - 99%)    GENERAL: NAD, lying in bed comfortably  HEAD:  Atraumatic, Normocephalic  EYES: EOMI, PERRLA, conjunctiva and sclera clear  ENT: Moist mucous membranes  NECK: Supple, No JVD  CHEST/LUNG: Clear to auscultation bilaterally; No rales, rhonchi, wheezing, or rubs. Unlabored respirations  HEART: Regular rate and rhythm; No murmurs, rubs, or gallops  ABDOMEN: +R. nephrostomy tube in place, BSx4; Soft, nontender, nondistended  EXTREMITIES:  2+ Peripheral Pulses, brisk capillary refill. No clubbing, cyanosis, or edema  NERVOUS SYSTEM:  A&Ox3, no focal deficits   SKIN: No rashes or lesions VITALS:   T(C): 36.8 (09-20-22 @ 03:02), Max: 36.8 (09-20-22 @ 03:02)  HR: 66 (09-20-22 @ 03:02) (66 - 70)  BP: 113/70 (09-20-22 @ 03:02) (113/70 - 144/66)  RR: 16 (09-20-22 @ 03:02) (16 - 16)  SpO2: 99% (09-20-22 @ 03:02) (98% - 99%)    GENERAL: NAD, lying in bed comfortably  HEAD:  Atraumatic, Normocephalic  EYES: EOMI, PERRLA, conjunctiva and sclera clear  ENT: Moist mucous membranes  NECK: Supple, No JVD  CHEST/LUNG: Clear to auscultation bilaterally; No rales, rhonchi, wheezing, or rubs. Unlabored respirations  HEART: Regular rate and rhythm; No murmurs, rubs, or gallops  ABDOMEN: +R. nephrostomy tube in place, BSx4; Soft, nontender, nondistended  EXTREMITIES:  2+ Peripheral Pulses, brisk capillary refill. No clubbing, cyanosis, or edema  NERVOUS SYSTEM:  A&Ox3, no focal deficits   SKIN: No rashes or lesions

## 2022-09-20 NOTE — H&P ADULT - PROBLEM SELECTOR PLAN 1
Pt presenting with UTI, complaining of dysuria and hematuria present in Nephrostomy bag  f/u Ucx and sensitivities  f/u Bcx  Will start on Cefepime 1gm q12, as she has previous hx of Multi drug resistant UTI  ID consult Pt presenting with UTI, complaining of dysuria and hematuria present in Nephrostomy bag  f/u Ucx and sensitivities  f/u Bcx  Will hold off Abx as of now as patient is afebrile and WBC's are normal  ID consulted - Dr. Rodriguez Pt presenting with c/o UTI, complaining of dysuria and hematuria present in Nephrostomy bag  f/u Ucx and sensitivities  f/u Bcx  Will hold off Abx as of now as patient is afebrile and WBC's are normal     unclear if bacteriuria is colonization..   ID consulted - Dr. Rodriguez

## 2022-09-20 NOTE — CONSULT NOTE ADULT - SUBJECTIVE AND OBJECTIVE BOX
Patient is a 56y old  Female who presents with a chief complaint of Chest Pain, UTI (20 Sep 2022 04:50)      INTERVAL HPI/OVERNIGHT EVENTS:        PAST MEDICAL & SURGICAL HISTORY:  Anemia      Asthma      HLD (hyperlipidemia)      Pacemaker  [employer]      Bladder cancer      HTN (hypertension)      Parathyroid tumor      Liver cyst      Hydronephrosis  has right Nephrostomy tube - dressing intact      Bradycardia      History of renal calculi      Birthmark      H/O myomectomy  Presence of cardiac pacemaker      H/O hydronephrosis  s/p Right Perc nephrostomy tube placement 2021, exchange 2021          REVIEW OF SYSTEMS: Total of twelve systems have been reviewed with patient and found to be negative unless mentioned in HPI      SOCIAL HISTORY  Alcohol: Does not drink  Tobacco: Does not smoke  Illicit substance use: None      FAMILY HISTORY: Non contributory to the present illness        ALLERGIES: No Known Allergies      Vital Signs Last 24 Hrs  T(C): 36.6 (20 Sep 2022 11:23), Max: 36.8 (20 Sep 2022 03:02)  T(F): 97.9 (20 Sep 2022 11:23), Max: 98.3 (20 Sep 2022 03:02)  HR: 66 (20 Sep 2022 11:23) (66 - 78)  BP: 119/57 (20 Sep 2022 11:23) (111/69 - 144/66)  BP(mean): --  RR: 17 (20 Sep 2022 11:23) (16 - 17)  SpO2: 99% (20 Sep 2022 11:23) (98% - 100%)    Parameters below as of 20 Sep 2022 11:23  Patient On (Oxygen Delivery Method): room air          PHYSICAL EXAM:  GENERAL: Not in distress   CHEST/LUNG:  Aire ntry bilaterally  HEART: s1 and s2 present  ABDOMEN:  Nontender and  Nondistended  EXTREMITIES: No pedal  edema  CNS: Awake and Alert      LABS:                        9.2    8.52  )-----------( 359      ( 20 Sep 2022 05:30 )             31.0     09-20    138  |  106  |  15  ----------------------------<  88  4.0   |  23  |  1.01    Ca    10.5      20 Sep 2022 05:30  Phos  3.0     09-20  Mg     2.1     09-20    TPro  8.4<H>  /  Alb  2.8<L>  /  TBili  0.1<L>  /  DBili  x   /  AST  19  /  ALT  11  /  AlkPhos  99  -      Urinalysis Basic - ( 20 Sep 2022 00:10 )  Color: Yellow / Appearance: Clear / S.015 / pH: x  Gluc: x / Ketone: Negative  / Bili: Negative / Urobili: Negative   Blood: x / Protein: 500 mg/dL / Nitrite: Negative   Leuk Esterase: Trace / RBC: 25-50 /HPF / WBC >50 /HPF   Sq Epi: x / Non Sq Epi: Few /HPF / Bacteria: Few /HPF        MEDICATIONS  (STANDING):  enoxaparin Injectable 40 milliGRAM(s) SubCutaneous every 24 hours  sodium chloride 0.9%. 1000 milliLiter(s) (80 mL/Hr) IV Continuous <Continuous>    MEDICATIONS  (PRN):  acetaminophen     Tablet .. 650 milliGRAM(s) Oral every 6 hours PRN Mild Pain (1 - 3)          RADIOLOGY & ADDITIONAL TESTS:               Patient is a 56y old  Female from home, with PMH of Anemia, Asthma, Bladder cancer (previously on chemo until 2022) with  Dr. Renay Trinidad, Right nephrostomy tube, hyperparathyroidism, bradycardia with PPM, now coming in for evaluation of chest pain, hematuria and diarrhea. She Also noticed that she had hematuria in her nephrostomy bag and a clot was passed into it. She was recently at [facility] for a UTI and was given 2g of ampicillin, after which she developed diarrhea. Additionally she has left flank pain. ON admission, she found to have no fever but positive Urine analysis. The CT A/P shows Right nephrostomy tube in place without hydronephrosis. Stable superior left hydronephrosis. Marked irregular bladder wall thickening, unchanged. Progression of liver metastasis. The ID consult requested to assist with further evaluation and antibiotic management.       REVIEW OF SYSTEMS: Total of twelve systems have been reviewed with patient and found to be negative unless mentioned in HPI      PAST MEDICAL & SURGICAL HISTORY:  Anemia  Asthma  HLD (hyperlipidemia)  Pacemaker, [employer]  Bladder cancer  HTN (hypertension)  Parathyroid tumor  Liver cyst  Hydronephrosis  has right Nephrostomy tube - dressing intact  Bradycardia  History of renal calculi  H/O myomectomy  Presence of cardiac pacemaker   H/O hydronephrosis  s/p Right Perc nephrostomy tube placement 2021, exchange 2021      SOCIAL HISTORY  Alcohol: Does not drink  Tobacco: Does not smoke  Illicit substance use: None      FAMILY HISTORY: Non contributory to the present illness      ALLERGIES: No Known Allergies      Vital Signs Last 24 Hrs  T(C): 36.6 (20 Sep 2022 11:23), Max: 36.8 (20 Sep 2022 03:02)  T(F): 97.9 (20 Sep 2022 11:23), Max: 98.3 (20 Sep 2022 03:02)  HR: 66 (20 Sep 2022 11:23) (66 - 78)  BP: 119/57 (20 Sep 2022 11:23) (111/69 - 144/66)  BP(mean): --  RR: 17 (20 Sep 2022 11:23) (16 - 17)  SpO2: 99% (20 Sep 2022 11:23) (98% - 100%)    Parameters below as of 20 Sep 2022 11:23  Patient On (Oxygen Delivery Method): room air        PHYSICAL EXAM:  GENERAL: Not in distress   CHEST/LUNG:  Not using accessory muscles   HEART: s1 and s2 present  ABDOMEN:  Nontender and  Nondistended  : Right Nephrostomy tube   EXTREMITIES: No pedal  edema  CNS: Awake and Alert      LABS:                        9.2    8.52  )-----------( 359      ( 20 Sep 2022 05:30 )             31.0     -    138  |  106  |  15  ----------------------------<  88  4.0   |  23  |  1.01    Ca    10.5      20 Sep 2022 05:30  Phos  3.0       Mg     2.1         TPro  8.4<H>  /  Alb  2.8<L>  /  TBili  0.1<L>  /  DBili  x   /  AST  19  /  ALT  11  /  AlkPhos  99        Urinalysis Basic - ( 20 Sep 2022 00:10 )  Color: Yellow / Appearance: Clear / S.015 / pH: x  Gluc: x / Ketone: Negative  / Bili: Negative / Urobili: Negative   Blood: x / Protein: 500 mg/dL / Nitrite: Negative   Leuk Esterase: Trace / RBC: 25-50 /HPF / WBC >50 /HPF   Sq Epi: x / Non Sq Epi: Few /HPF / Bacteria: Few /HPF        MEDICATIONS  (STANDING):  enoxaparin Injectable 40 milliGRAM(s) SubCutaneous every 24 hours  sodium chloride 0.9%. 1000 milliLiter(s) (80 mL/Hr) IV Continuous <Continuous>    MEDICATIONS  (PRN):  acetaminophen     Tablet .. 650 milliGRAM(s) Oral every 6 hours PRN Mild Pain (1 - 3)        RADIOLOGY & ADDITIONAL TESTS:    22: CT Abdomen and Pelvis No Cont (22 @ 01:34) Chest CT: Interval progression of cavitary pulmonary metastatic disease   with increased size and number of pulmonary nodules. No evidence of  pneumonia.    CT abdomen/pelvis: Right nephrostomy tube in place without  hydronephrosis. Stable superior left hydronephrosis. Marked irregular   bladder wall thickening, unchanged. Progression of liver metastasis. Stable retroperitoneal adenopathy.        MICROBIOLOGY DATA:     COVID-19 PCR . (22 @ 06:00)   COVID-19 PCR: NotDetec:"

## 2022-09-20 NOTE — H&P ADULT - NSHPADDITIONALINFOADULT_GEN_ALL_CORE
Patient seen, examined, and interviewed by me, case discussed with me, chart reviewed, agree with above H/P as reviewed.  See  full note above.    ~~ High Sensitivity Troponin  ~~  5.7  <<--, 5.1  <<--    Dr. LUIS ANGEL Ochoa ([phone])

## 2022-09-20 NOTE — H&P ADULT - HISTORY OF PRESENT ILLNESS
This is a 56 year old female from home, with PMH of Anemia, Asthma, Bladder cancer (previously on chemo until 5/2022) with  Dr. Renay Trinidad, R nephrostomy tube, hyperparathyroidism, bradycardia with PPM coming in for chest pain, hematuria and diarrhea. Pt states that chest pain started 2 days ago, was sharp in nature, 7/10, relieved with tylenol. She Also noticed that she had hematuria in her nephrostomy bag and a clot was passed into it. She was recently at [facility] for a UTI and was given 2g of ampicillin, after which she developed diarrhea. Additionally she has left flank pain now.     In ED v/s: /70, HR 66, RR 16, T 98.3    CT A/P: Right nephrostomy tube in place without hydronephrosis. Stable superior left hydronephrosis. Marked irregular bladder wall thickening, unchanged. Progression of liver metastasis.

## 2022-09-20 NOTE — H&P ADULT - PROBLEM SELECTOR PLAN 2
Pt complaining of chest pain, sharp in nature and relieved with Tylenol  unlikely to be cardiac in nature  Trop x1 neg, f/u 2nd Trop  EKG with no changes compared to previous EKG

## 2022-09-21 NOTE — PROGRESS NOTE ADULT - SUBJECTIVE AND OBJECTIVE BOX
Patient is seen and examined at the bed side, is afebrile. The Urine and blood cultures  are in process.       REVIEW OF SYSTEMS: All other review systems are negative      ALLERGIES: No Known Allergies      Vital Signs Last 24 Hrs  T(C): 36.6 (21 Sep 2022 11:06), Max: 36.9 (20 Sep 2022 19:33)  T(F): 97.8 (21 Sep 2022 11:06), Max: 98.4 (20 Sep 2022 19:33)  HR: 70 (21 Sep 2022 11:06) (60 - 80)  BP: 99/56 (21 Sep 2022 11:06) (99/56 - 125/69)  BP(mean): --  RR: 17 (21 Sep 2022 11:06) (17 - 18)  SpO2: 99% (21 Sep 2022 11:06) (96% - 100%)    Parameters below as of 21 Sep 2022 11:06  Patient On (Oxygen Delivery Method): room air        PHYSICAL EXAM:  GENERAL: Not in distress   CHEST/LUNG:  Not using accessory muscles   HEART: s1 and s2 present  ABDOMEN:  Nontender and  Nondistended  : Right Nephrostomy tube   EXTREMITIES: No pedal  edema  CNS: Awake and Alert      LABS:                        8.4    8.28  )-----------( 291      ( 21 Sep 2022 06:10 )             29.2                           9.2    8.52  )-----------( 359      ( 20 Sep 2022 05:30 )             31.0       09-21    137  |  106  |  19<H>  ----------------------------<  76  4.1   |  23  |  1.00    Ca    10.6<H>      21 Sep 2022 06:10  Phos  3.0     09-20  Mg     2.1     -20    TPro  8.4<H>  /  Alb  2.8<L>  /  TBili  0.1<L>  /  DBili  x   /  AST  19  /  ALT  11  /  AlkPhos  99  -    09-20    138  |  106  |  15  ----------------------------<  88  4.0   |  23  |  1.01    Ca    10.5      20 Sep 2022 05:30  Phos  3.0       Mg     2.1         TPro  8.4<H>  /  Alb  2.8<L>  /  TBili  0.1<L>  /  DBili  x   /  AST  19  /  ALT  11  /  AlkPhos  99        Urinalysis Basic - ( 20 Sep 2022 00:10 )  Color: Yellow / Appearance: Clear / S.015 / pH: x  Gluc: x / Ketone: Negative  / Bili: Negative / Urobili: Negative   Blood: x / Protein: 500 mg/dL / Nitrite: Negative   Leuk Esterase: Trace / RBC: 25-50 /HPF / WBC >50 /HPF   Sq Epi: x / Non Sq Epi: Few /HPF / Bacteria: Few /HPF        MEDICATIONS  (STANDING):    cefepime   IVPB 1000 milliGRAM(s) IV Intermittent every 12 hours  enoxaparin Injectable 40 milliGRAM(s) SubCutaneous every 24 hours  sodium chloride 0.9%. 1000 milliLiter(s) (80 mL/Hr) IV Continuous <Continuous>        RADIOLOGY & ADDITIONAL TESTS:    22: CT Abdomen and Pelvis No Cont (22 @ 01:34) Chest CT: Interval progression of cavitary pulmonary metastatic disease   with increased size and number of pulmonary nodules. No evidence of  pneumonia.    CT abdomen/pelvis: Right nephrostomy tube in place without  hydronephrosis. Stable superior left hydronephrosis. Marked irregular   bladder wall thickening, unchanged. Progression of liver metastasis. Stable retroperitoneal adenopathy.        MICROBIOLOGY DATA:     COVID-19 PCR . (22 @ 06:00)   COVID-19 PCR: NotDetec:"             Patient is seen and examined at the bed side, is afebrile.  She is c/o pain. The Urine and blood cultures  are in process.       REVIEW OF SYSTEMS: All other review systems are negative      ALLERGIES: No Known Allergies      Vital Signs Last 24 Hrs  T(C): 36.6 (21 Sep 2022 11:06), Max: 36.9 (20 Sep 2022 19:33)  T(F): 97.8 (21 Sep 2022 11:06), Max: 98.4 (20 Sep 2022 19:33)  HR: 70 (21 Sep 2022 11:06) (60 - 80)  BP: 99/56 (21 Sep 2022 11:06) (99/56 - 125/69)  BP(mean): --  RR: 17 (21 Sep 2022 11:06) (17 - 18)  SpO2: 99% (21 Sep 2022 11:06) (96% - 100%)    Parameters below as of 21 Sep 2022 11:06  Patient On (Oxygen Delivery Method): room air        PHYSICAL EXAM:  GENERAL: Not in distress   CHEST/LUNG:  Not using accessory muscles   HEART: s1 and s2 present  ABDOMEN:  Nontender and  Nondistended  : Right Nephrostomy tube   EXTREMITIES: No pedal  edema  CNS: Awake and Alert      LABS:                        8.4    8.28  )-----------( 291      ( 21 Sep 2022 06:10 )             29.2                           9.2    8.52  )-----------( 359      ( 20 Sep 2022 05:30 )             31.0       09-21    137  |  106  |  19<H>  ----------------------------<  76  4.1   |  23  |  1.00    Ca    10.6<H>      21 Sep 2022 06:10  Phos  3.0     09-20  Mg     2.1     -20    TPro  8.4<H>  /  Alb  2.8<L>  /  TBili  0.1<L>  /  DBili  x   /  AST  19  /  ALT  11  /  AlkPhos  99  -    09-20    138  |  106  |  15  ----------------------------<  88  4.0   |  23  |  1.01    Ca    10.5      20 Sep 2022 05:30  Phos  3.0       Mg     2.1         TPro  8.4<H>  /  Alb  2.8<L>  /  TBili  0.1<L>  /  DBili  x   /  AST  19  /  ALT  11  /  AlkPhos  99        Urinalysis Basic - ( 20 Sep 2022 00:10 )  Color: Yellow / Appearance: Clear / S.015 / pH: x  Gluc: x / Ketone: Negative  / Bili: Negative / Urobili: Negative   Blood: x / Protein: 500 mg/dL / Nitrite: Negative   Leuk Esterase: Trace / RBC: 25-50 /HPF / WBC >50 /HPF   Sq Epi: x / Non Sq Epi: Few /HPF / Bacteria: Few /HPF        MEDICATIONS  (STANDING):    cefepime   IVPB 1000 milliGRAM(s) IV Intermittent every 12 hours  enoxaparin Injectable 40 milliGRAM(s) SubCutaneous every 24 hours  sodium chloride 0.9%. 1000 milliLiter(s) (80 mL/Hr) IV Continuous <Continuous>        RADIOLOGY & ADDITIONAL TESTS:    22: CT Abdomen and Pelvis No Cont (22 @ 01:34) Chest CT: Interval progression of cavitary pulmonary metastatic disease   with increased size and number of pulmonary nodules. No evidence of  pneumonia.    CT abdomen/pelvis: Right nephrostomy tube in place without  hydronephrosis. Stable superior left hydronephrosis. Marked irregular   bladder wall thickening, unchanged. Progression of liver metastasis. Stable retroperitoneal adenopathy.        MICROBIOLOGY DATA:     COVID-19 PCR . (22 @ 06:00)   COVID-19 PCR: NotDetec:"

## 2022-09-21 NOTE — PROGRESS NOTE ADULT - ASSESSMENT
56 year old female from home, with PMHX of Bladder cancer (previously on chemo until 5/2022) with  Dr. Renay Trinidad, chronic hydronephrosis  R nephrostomy tube, hyperparathyroidism, bradycardia with PPM coming in for hematuria. Of note patient with hx multidrug resistant UTI, recent nephrostomy tube exchange 8/22/22. Started on Cefepime based on previous UCX- ID Rodriguez consulted. Pending urine culture   CT A/P: Right nephrostomy tube in place without hydronephrosis. Stable superior left hydronephrosis. Marked irregular bladder wall thickening, unchanged. Progression of liver metastasis. Hospital course complicated by chest pain, EKG with possible ischemia- Dr Clark consulted 56 year old female from home, with PMHX of Bladder cancer (previously on chemo until 5/2022) with  Dr. Renay Trinidad, chronic hydronephrosis  R nephrostomy tube, hyperparathyroidism, bradycardia with PPM coming in for hematuria. Of note patient with hx multidrug resistant UTI, recent nephrostomy tube exchange 8/22/22. Started on Cefepime based on previous UCX- ID Rodriguez consulted. Pending urine culture   CT A/P: Right nephrostomy tube in place without hydronephrosis. Stable superior left hydronephrosis. Marked irregular bladder wall thickening, unchanged. Progression of liver metastasis. Hospital course complicated by chest pain, EKG with possible inferior ischemia- Dr Clark consulted

## 2022-09-21 NOTE — PATIENT PROFILE ADULT - DO YOU LACK THE NECESSARY SUPPORT TO HELP YOU COPE WITH LIFE CHALLENGES?
Discussed diagnosis in detail with patient. MR done today by Dr. Jos Mckeon and new glasses Rx given, explained to patient that his Rx has changed a lot. Blurred vision improved with new Rx. no

## 2022-09-21 NOTE — PROGRESS NOTE ADULT - PROBLEM SELECTOR PLAN 4
Pt presenting with diarrhea after receiving Ampicillin  f/u Stool cx, GI PCR and C. diff  NS 80cc/hr Pt complaining of chest pain, sharp in nature and relieved with Tylenol  unlikely to be cardiac in nature  Trop x1 neg, f/u 2nd Trop  EKG with no changes compared to previous EKG s/p R nephrostomy tube exchange 08/2022  chronic stable left hydro   monitor urine output   trend BMP

## 2022-09-21 NOTE — PROGRESS NOTE ADULT - ASSESSMENT
Patient is a 56y old  Female from home, with PMH of Anemia, Asthma, Bladder cancer (previously on chemo until 5/2022) with  Dr. Renay Trinidad, Right nephrostomy tube, hyperparathyroidism, bradycardia with PPM, now coming in for evaluation of chest pain, hematuria and diarrhea. She Also noticed that she had hematuria in her nephrostomy bag and a clot was passed into it. She was recently at [facility] for a UTI and was given 2g of ampicillin, after which she developed diarrhea. Additionally she has left flank pain. ON admission, she found to have no fever but positive Urine analysis. The CT A/P shows Right nephrostomy tube in place without hydronephrosis. Stable superior left hydronephrosis. Marked irregular bladder wall thickening, unchanged. Progression of liver metastasis. The ID consult requested to assist with further evaluation and antibiotic management.     # Recurrent Complicated UTI-  WBC >50 /HPF - ( 20 Sep 2022 00:10 )  # Right nephrostomy tube  #Metastatic Bladder cancer    would recommend:    1. Follow up Urine  and Blood cultures, are in process  2. Please adjust Cefepime doses to 1 g q 8 instead of q 12hr  3. Monitor h/h and transfuse as needed   4. Pain management as needed    d/w Patient and       Attending Attestation:    Spent more than 45 minutes on total encounter, more than 50 % of the visit was spent counseling and/or coordinating care by the Attending physician.     Patient is a 56y old  Female from home, with PMH of Anemia, Asthma, Bladder cancer (previously on chemo until 5/2022) with  Dr. Renay Trinidad, Right nephrostomy tube, hyperparathyroidism, bradycardia with PPM, now coming in for evaluation of chest pain, hematuria and diarrhea. She Also noticed that she had hematuria in her nephrostomy bag and a clot was passed into it. She was recently at [facility] for a UTI and was given 2g of ampicillin, after which she developed diarrhea. Additionally she has left flank pain. ON admission, she found to have no fever but positive Urine analysis. The CT A/P shows Right nephrostomy tube in place without hydronephrosis. Stable superior left hydronephrosis. Marked irregular bladder wall thickening, unchanged. Progression of liver metastasis. The ID consult requested to assist with further evaluation and antibiotic management.     # Recurrent Complicated UTI-  WBC >50 /HPF - ( 20 Sep 2022 00:10 )  # Right nephrostomy tube  #Metastatic Bladder cancer    would recommend:    1. Follow up Urine  and Blood cultures, are in process  2. Please adjust Cefepime doses to 1 g q 8 instead of q 12hr  3. Monitor h/h and transfuse as needed   4. Pain management as needed    d/w DR. Ochoa, covering NP, Bhargavi Regalado and Patient       Attending Attestation:    Spent more than 45 minutes on total encounter, more than 50 % of the visit was spent counseling and/or coordinating care by the Attending physician.

## 2022-09-21 NOTE — PROGRESS NOTE ADULT - PROBLEM SELECTOR PLAN 3
Pt complaining of chest pain, sharp in nature and relieved with Tylenol  unlikely to be cardiac in nature  Trop x1 neg, f/u 2nd Trop  EKG with no changes compared to previous EKG Pt presenting with c/o UTI, complaining of dysuria and hematuria present in Nephrostomy bag  f/u Ucx and sensitivities  f/u Bcx  Will hold off Abx as of now as patient is afebrile and WBC's are normal     unclear if bacteriuria is colonization..   ID consulted - Dr. Rodriguez c/p flank pain that radiates to chest   F/U troponin   EKG with possible ischemia   Dr Rosario consulted

## 2022-09-21 NOTE — PROGRESS NOTE ADULT - PROBLEM SELECTOR PLAN 5
Lovenox for DVT ppx Pt presenting with diarrhea after receiving Ampicillin  f/u Stool cx, GI PCR and C. diff  NS 80cc/hr pending collection for stool studies   now improved

## 2022-09-21 NOTE — PROGRESS NOTE ADULT - ASSESSMENT
( Note written / Date of service 09-21-22 )    ==================>> MEDICATIONS <<====================    cefepime   IVPB 1000 milliGRAM(s) IV Intermittent every 8 hours  enoxaparin Injectable 40 milliGRAM(s) SubCutaneous every 24 hours  sodium chloride 0.9%. 1000 milliLiter(s) IV Continuous <Continuous>    MEDICATIONS  (PRN):  acetaminophen     Tablet .. 650 milliGRAM(s) Oral every 6 hours PRN Mild Pain (1 - 3)  traMADol 25 milliGRAM(s) Oral every 6 hours PRN Severe Pain (7 - 10)    ___________  Active diet:  Diet, Regular  ___________________    ==================>> VITAL SIGNS <<==================  Height (cm): 162.6  Weight (kg): 38.3  BMI (kg/m2): 14.5  Vital Signs Last 24 HrsT(C): 36.6 (09-21-22 @ 11:06)  T(F): 97.8 (09-21-22 @ 11:06), Max: 98.4 (09-20-22 @ 19:33)  HR: 70 (09-21-22 @ 11:06) (60 - 80)  BP: 99/56 (09-21-22 @ 11:06)  RR: 17 (09-21-22 @ 11:06) (17 - 18)  SpO2: 99% (09-21-22 @ 11:06) (96% - 100%)      CAPILLARY BLOOD GLUCOSE         ==================>> LAB AND IMAGING <<==================                        8.4    8.28  )-----------( 291      ( 21 Sep 2022 06:10 )             29.2        09-21    137  |  106  |  19<H>  ----------------------------<  76  4.1   |  23  |  1.00    Ca    10.6<H>      21 Sep 2022 06:10  Phos  3.0     09-20  Mg     2.1     09-20    TPro  8.4<H>  /  Alb  2.8<L>  /  TBili  0.1<L>  /  DBili  x   /  AST  19  /  ALT  11  /  AlkPhos  99  09-19    WBC count:   8.28 <<== ,  8.52 <<== ,  8.14 <<==   Hemoglobin:   8.4 <<==,  9.2 <<==,  8.9 <<==  platelets:  291 <==, 359 <==, 353 <==    Creatinine:  1.00  <<==, 1.01  <<==, 1.00  <<==  Sodium:   137  <==, 138  <==, 136  <==       AST:          19 <==      ALT:        11  <==      AP:        99  <=     Bili:        0.1  <=    ____________________________    M I C R O B I O L O G Y :    Culture - Blood (collected 20 Sep 2022 05:50)  Source: .Blood Blood  Preliminary Report (21 Sep 2022 13:02):    No growth to date.    Culture - Blood (collected 20 Sep 2022 05:35)  Source: .Blood Blood  Preliminary Report (21 Sep 2022 13:02):    No growth to date.    Culture - Urine (collected 20 Sep 2022 00:10)  Source: Clean Catch Clean Catch (Midstream)  Preliminary Report (21 Sep 2022 14:25):    >100,000 CFU/ml Enterococcus species        COVID-19 PCR: NotDetec (09-20-22 @ 06:00)     _________________________________________________________________________________________  ========>>  M E D I C A L   A T T E N D I N G    F O L L O W  U P  N O T E  <<=========  -----------------------------------------------------------------------------------------------------    - Patient seen and examined by me earlier today.   - In summary,  JEF HOUSE is a 56y year old woman admitted with UTI  - Patient today overall doing ok, comfortable, eating fairly      pt with c/o left flank pain ( chronically obstructed kidney) and some ? association / radiation to left chest     ==================>> REVIEW OF SYSTEM <<=================    GEN: no fever, no chills, as above   RESP: no SOB, no cough, no sputum  CVS: chest pain as above ( related to left flank pain) , no palpitations  GI: no abdominal pain, no nausea, no more diarrhea reported   : no dysuria, no frequency.. no more blood tinged urin in bag   Neuro: no headache, no dizziness  Derm : no itching, no rash    ==================>> PHYSICAL EXAM <<=================    GEN: A&O X 2 , NAD , somewhat uncomfortable, pleasant, calm   HEENT: NCAT, PERRL, MMM, hearing intact  Neck: supple , no JVD appreciated  CVS: S1S2 , regular , No M/R/G appreciated  PULM: CTA B/L,  no W/R/R appreciated  ABD.: soft. non tender, non distended,  bowel sounds present  Extrem: intact pulses , no edema      nephrostomy bag / leg bag draining clear yellow urine   PSYCH : normal mood,  not anxious                             ( Note written / Date of service 09-21-22 )    ==================>> MEDICATIONS <<====================    cefepime   IVPB 1000 milliGRAM(s) IV Intermittent every 8 hours  enoxaparin Injectable 40 milliGRAM(s) SubCutaneous every 24 hours  sodium chloride 0.9%. 1000 milliLiter(s) IV Continuous <Continuous>    MEDICATIONS  (PRN):  acetaminophen     Tablet .. 650 milliGRAM(s) Oral every 6 hours PRN Mild Pain (1 - 3)  traMADol 25 milliGRAM(s) Oral every 6 hours PRN Severe Pain (7 - 10)    ___________  Active diet:  Diet, Regular  ___________________    ==================>> VITAL SIGNS <<==================    Height (cm): 162.6  Weight (kg): 38.3  BMI (kg/m2): 14.5  Vital Signs Last 24 HrsT(C): 36.6 (09-21-22 @ 11:06)  T(F): 97.8 (09-21-22 @ 11:06), Max: 98.4 (09-20-22 @ 19:33)  HR: 70 (09-21-22 @ 11:06) (60 - 80)  BP: 99/56 (09-21-22 @ 11:06)  RR: 17 (09-21-22 @ 11:06) (17 - 18)  SpO2: 99% (09-21-22 @ 11:06) (96% - 100%)       ==================>> LAB AND IMAGING <<==================                        8.4    8.28  )-----------( 291      ( 21 Sep 2022 06:10 )             29.2        09-21    137  |  106  |  19<H>  ----------------------------<  76  4.1   |  23  |  1.00    Ca    10.6<H>      21 Sep 2022 06:10  Phos  3.0     09-20  Mg     2.1     09-20    TPro  8.4<H>  /  Alb  2.8<L>  /  TBili  0.1<L>  /  DBili  x   /  AST  19  /  ALT  11  /  AlkPhos  99  09-19    WBC count:   8.28 <<== ,  8.52 <<== ,  8.14 <<==   Hemoglobin:   8.4 <<==,  9.2 <<==,  8.9 <<==  platelets:  291 <==, 359 <==, 353 <==    Creatinine:  1.00  <<==, 1.01  <<==, 1.00  <<==  Sodium:   137  <==, 138  <==, 136  <==    ____________________________    M I C R O B I O L O G Y :    Culture - Blood (collected 20 Sep 2022 05:50)  Source: .Blood Blood  Preliminary Report (21 Sep 2022 13:02):    No growth to date.    Culture - Blood (collected 20 Sep 2022 05:35)  Source: .Blood Blood  Preliminary Report (21 Sep 2022 13:02):    No growth to date.    Culture - Urine (collected 20 Sep 2022 00:10)  Source: Clean Catch Clean Catch (Midstream)  Preliminary Report (21 Sep 2022 14:25):    >100,000 CFU/ml Enterococcus species    ___________________________________________________________________________________  ===============>>  A S S E S S M E N T   A N D   P L A N <<===============  ------------------------------------------------------------------------------------------    · Assessment	  56 year old female from home, with PMHX of Bladder cancer (previously on chemo until 5/2022) with  Dr. Renay Trinidad, chronic hydronephrosis  R nephrostomy tube, hyperparathyroidism, bradycardia with PPM coming in for hematuria. Of note patient with hx multidrug resistant UTI, recent nephrostomy tube exchange 8/22/22. Started on Cefepime based on previous UCX- ID Jennifer consulted. Pending urine culture   CT A/P: Right nephrostomy tube in place without hydronephrosis. Stable superior left hydronephrosis. Marked irregular bladder wall thickening, unchanged. Progression of liver metastasis. Hospital course complicated by chest pain, EKG with possible inferior ischemia- Dr Clark consulted     Problem/Plan - 1:  ·  Problem: Complicated UTI (urinary tract infection).   ·  Plan: P/W Hematuria in nephrostomy drainage bag   hx of multi drug resistant UTIs   started on cefepime per previous UCX  FU UCx  ID Jennifer following     Problem/Plan - 2:  ·  Problem: Metastatic cancer.   ·  Plan: metastatic bladder CA   CT confirms progression of disease with mets to lung and liver   last chemo 05/2022  plan to continue treatment once discharged.     Problem/Plan - 3:  ·  Problem: Atypical chest pain.  ·  Plan: c/p flank pain that radiates to chest   EKG with possible ischemia   Dr Rosario consulted.     Problem/Plan - 4:  ·  Problem: Hydronephrosis.  ·  Plan: s/p R nephrostomy tube exchange 08/2022  chronic stable left hydro   monitor urine output   trend BMP.     Problem/Plan - 5:  ·  Problem: Diarrhea.   now improved.  likely abx related   encouraged to increase PO intake      Problem/Plan - 6:  ·  Problem: Prophylactic measure.   ·  Plan: Lovenox for DVT ppx.     Problem/Plan - 7:  ·  Problem: Discharge planning issues.  ·  Plan: patient from home   will likely return home without needed   remains on IV ABx   pending UCX.    --------------------------------------------  Case discussed with pt, NP, ID, cardio..   Education given on findings and plan of care  ___________________________  H. NIEVES Ochoa. (covering today)   Pager: [phone]         _________________________________________________________________________________________  ========>>  M E D I C A L   A T T E N D I N G    F O L L O W  U P  N O T E  <<=========  -----------------------------------------------------------------------------------------------------    - Patient seen and examined by me earlier today.   - In summary,  JEF HOUSE is a 56y year old woman admitted with UTI  - Patient today overall doing ok, comfortable, eating fairly      pt with c/o left flank pain ( chronically obstructed kidney) and some ? association / radiation to left chest     ==================>> REVIEW OF SYSTEM <<=================    GEN: no fever, no chills, as above   RESP: no SOB, no cough, no sputum  CVS: chest pain as above ( related to left flank pain) , no palpitations  GI: no abdominal pain, no nausea, no more diarrhea reported   : no dysuria, no frequency.. no more blood tinged urin in bag   Neuro: no headache, no dizziness  Derm : no itching, no rash    ==================>> PHYSICAL EXAM <<=================    GEN: A&O X 2 , NAD , somewhat uncomfortable, pleasant, calm   HEENT: NCAT, PERRL, MMM, hearing intact  Neck: supple , no JVD appreciated  CVS: S1S2 , regular , No M/R/G appreciated  PULM: CTA B/L,  no W/R/R appreciated  ABD.: soft. non tender, non distended,  bowel sounds present  Extrem: intact pulses , no edema      nephrostomy bag / leg bag draining clear yellow urine   PSYCH : normal mood,  not anxious                             ( Note written / Date of service 09-21-22 )    ==================>> MEDICATIONS <<====================    cefepime   IVPB 1000 milliGRAM(s) IV Intermittent every 8 hours  enoxaparin Injectable 40 milliGRAM(s) SubCutaneous every 24 hours  sodium chloride 0.9%. 1000 milliLiter(s) IV Continuous <Continuous>    MEDICATIONS  (PRN):  acetaminophen     Tablet .. 650 milliGRAM(s) Oral every 6 hours PRN Mild Pain (1 - 3)  traMADol 25 milliGRAM(s) Oral every 6 hours PRN Severe Pain (7 - 10)    ___________  Active diet:  Diet, Regular  ___________________    ==================>> VITAL SIGNS <<==================    Height (cm): 162.6  Weight (kg): 38.3  BMI (kg/m2): 14.5  Vital Signs Last 24 HrsT(C): 36.6 (09-21-22 @ 11:06)  T(F): 97.8 (09-21-22 @ 11:06), Max: 98.4 (09-20-22 @ 19:33)  HR: 70 (09-21-22 @ 11:06) (60 - 80)  BP: 99/56 (09-21-22 @ 11:06)  RR: 17 (09-21-22 @ 11:06) (17 - 18)  SpO2: 99% (09-21-22 @ 11:06) (96% - 100%)       ==================>> LAB AND IMAGING <<==================                        8.4    8.28  )-----------( 291      ( 21 Sep 2022 06:10 )             29.2        09-21    137  |  106  |  19<H>  ----------------------------<  76  4.1   |  23  |  1.00    Ca    10.6<H>      21 Sep 2022 06:10  Phos  3.0     09-20  Mg     2.1     09-20    TPro  8.4<H>  /  Alb  2.8<L>  /  TBili  0.1<L>  /  DBili  x   /  AST  19  /  ALT  11  /  AlkPhos  99  09-19    WBC count:   8.28 <<== ,  8.52 <<== ,  8.14 <<==   Hemoglobin:   8.4 <<==,  9.2 <<==,  8.9 <<==  platelets:  291 <==, 359 <==, 353 <==    Creatinine:  1.00  <<==, 1.01  <<==, 1.00  <<==  Sodium:   137  <==, 138  <==, 136  <==    ____________________________    M I C R O B I O L O G Y :    Culture - Blood (collected 20 Sep 2022 05:50)  Source: .Blood Blood  Preliminary Report (21 Sep 2022 13:02):    No growth to date.    Culture - Blood (collected 20 Sep 2022 05:35)  Source: .Blood Blood  Preliminary Report (21 Sep 2022 13:02):    No growth to date.    Culture - Urine (collected 20 Sep 2022 00:10)  Source: Clean Catch Clean Catch (Midstream)  Preliminary Report (21 Sep 2022 14:25):    >100,000 CFU/ml Enterococcus species    ___________________________________________________________________________________  ===============>>  A S S E S S M E N T   A N D   P L A N <<===============  ------------------------------------------------------------------------------------------    · Assessment	  56 year old female from home, with PMHX of Bladder cancer (previously on chemo until 5/2022) with  Dr. Renay Trinidad, chronic hydronephrosis  R nephrostomy tube, hyperparathyroidism, bradycardia with PPM coming in for hematuria. Of note patient with hx multidrug resistant UTI, recent nephrostomy tube exchange 8/22/22. Started on Cefepime based on previous UCX- ID Jennifer consulted. Pending urine culture   CT A/P: Right nephrostomy tube in place without hydronephrosis. Stable superior left hydronephrosis. Marked irregular bladder wall thickening, unchanged. Progression of liver metastasis. Hospital course complicated by chest pain, EKG with possible inferior ischemia- Dr Clark consulted     Problem/Plan - 1:  ·  Problem: Complicated UTI (urinary tract infection).   ·  Plan: P/W Hematuria in nephrostomy drainage bag   hx of multi drug resistant UTIs   started on cefepime per previous UCX  FU UCx  ID Jennifer following     Problem/Plan - 2:  ·  Problem: Metastatic cancer.   ·  Plan: metastatic bladder CA   CT confirms progression of disease with mets to lung and liver   last chemo 05/2022  plan to continue treatment once discharged.     Problem/Plan - 3:  ·  Problem: Atypical chest pain.  ·  Plan: c/p flank pain that radiates to chest   EKG with possible ischemia   Dr Rosario consulted.     Problem/Plan - 4:  ·  Problem: Hydronephrosis.  ·  Plan: s/p R nephrostomy tube exchange 08/2022  chronic stable left hydro   monitor urine output   trend BMP.     Problem/Plan - 5:  ·  Problem: Diarrhea.   now improved.  likely abx related   encouraged to increase PO intake      Problem/Plan - 6:  ·  Problem: Prophylactic measure.   ·  Plan: Lovenox for DVT ppx.     Problem/Plan - 7:  ·  Problem: Discharge planning issues.  ·  Plan: patient from home   will likely return home without needed   remains on IV ABx   pending UCX.    --------------------------------------------  Case discussed with pt, NP, ID, cardio..   Education given on findings and plan of care  ___________________________  H. NIEVES Ochoa.   Pager: [phone]

## 2022-09-21 NOTE — PATIENT PROFILE ADULT - FUNCTIONAL SCREEN CURRENT LEVEL: COMMUNICATION, MLM
"Chief Complaint   Patient presents with     Physical       Initial /79 (BP Location: Right arm, Patient Position: Chair, Cuff Size: Adult Large)  Pulse 74  Temp 98.2  F (36.8  C) (Oral)  Ht 5' 6.5\" (1.689 m)  Wt 142 lb (64.4 kg)  LMP 06/01/2006  SpO2 97%  Breastfeeding? No  BMI 22.58 kg/m2 Estimated body mass index is 22.58 kg/(m^2) as calculated from the following:    Height as of this encounter: 5' 6.5\" (1.689 m).    Weight as of this encounter: 142 lb (64.4 kg).  Medication Reconciliation: complete Erika Emerson MA  Health Maintenance has been reviewed.       " 0 = understands/communicates without difficulty

## 2022-09-21 NOTE — PROGRESS NOTE ADULT - SUBJECTIVE AND OBJECTIVE BOX
NP Note discussed with  primary attending    Patient is a 56y old  Female who presents with a chief complaint of Chest Pain, UTI (21 Sep 2022 11:29)      INTERVAL HPI/OVERNIGHT EVENTS:  left flank pain that radiates to her chest     MEDICATIONS  (STANDING):  cefepime   IVPB 1000 milliGRAM(s) IV Intermittent every 8 hours  enoxaparin Injectable 40 milliGRAM(s) SubCutaneous every 24 hours  sodium chloride 0.9%. 1000 milliLiter(s) (80 mL/Hr) IV Continuous <Continuous>    MEDICATIONS  (PRN):  acetaminophen     Tablet .. 650 milliGRAM(s) Oral every 6 hours PRN Mild Pain (1 - 3)  traMADol 25 milliGRAM(s) Oral every 6 hours PRN Severe Pain (7 - 10)      __________________________________________________  REVIEW OF SYSTEMS:    CONSTITUTIONAL: No fever,   EYES: no acute visual disturbances  NECK: No pain or stiffness  RESPIRATORY: No cough; No shortness of breath  CARDIOVASCULAR: +chest pain, no palpitations  GASTROINTESTINAL: No pain. No nausea or vomiting; No diarrhea   NEUROLOGICAL: No headache or numbness, no tremors  MUSCULOSKELETAL: No joint pain, no muscle pain  GENITOURINARY: no dysuria, no frequency, no hesitancy + flank pain   PSYCHIATRY: no depression , no anxiety  ALL OTHER  ROS negative        Vital Signs Last 24 Hrs  T(C): 36.6 (21 Sep 2022 11:06), Max: 36.9 (20 Sep 2022 19:33)  T(F): 97.8 (21 Sep 2022 11:06), Max: 98.4 (20 Sep 2022 19:33)  HR: 70 (21 Sep 2022 11:06) (60 - 80)  BP: 99/56 (21 Sep 2022 11:06) (99/56 - 125/69)  BP(mean): --  RR: 17 (21 Sep 2022 11:06) (17 - 18)  SpO2: 99% (21 Sep 2022 11:06) (96% - 100%)    Parameters below as of 21 Sep 2022 11:06  Patient On (Oxygen Delivery Method): room air        ________________________________________________    PHYSICAL EXAM:   GENERAL: NAD  HEAD:  Atraumatic, Normocephalic  EYES: EOMI, PERRLA, conjunctiva and sclera clear  ENT: Moist mucous membranes  NECK: Supple, No JVD  CHEST/LUNG: Clear to auscultation bilaterally; No rales, rhonchi, wheezing, or rubs. Unlabored respirations  HEART: Regular rate and rhythm; No murmurs, rubs, or gallops  ABDOMEN: +R. nephrostomy tube in place, BSx4; Soft, nontender, nondistended  EXTREMITIES:  2+ Peripheral Pulses, brisk capillary refill. No clubbing, cyanosis, or edema  NERVOUS SYSTEM:  A&Ox3, no focal deficits   SKIN: pale No rashes or lesions    _________________________________________________  LABS:                        8.4    8.28  )-----------( 291      ( 21 Sep 2022 06:10 )             29.2     09-21    137  |  106  |  19<H>  ----------------------------<  76  4.1   |  23  |  1.00    Ca    10.6<H>      21 Sep 2022 06:10  Phos  3.0     09-20  Mg     2.1     09-20    TPro  8.4<H>  /  Alb  2.8<L>  /  TBili  0.1<L>  /  DBili  x   /  AST  19  /  ALT  11  /  AlkPhos  99  09-19      Urinalysis Basic - ( 21 Sep 2022 03:44 )    Color: Brown / Appearance: very cloudy / S.015 / pH: x  Gluc: x / Ketone: Negative  / Bili: Negative / Urobili: Negative   Blood: x / Protein: 500 mg/dL / Nitrite: Negative   Leuk Esterase: Small / RBC: >50 /HPF / WBC >50 /HPF   Sq Epi: x / Non Sq Epi: Few /HPF / Bacteria: Moderate /HPF      CAPILLARY BLOOD GLUCOSE            RADIOLOGY & ADDITIONAL TESTS: < from: CT Abdomen and Pelvis No Cont (22 @ 01:34) >    IMPRESSION:  Chest CT: Interval progression of cavitary pulmonary metastatic disease   with increased size and number of pulmonary nodules. No evidence of   pneumonia.    CT abdomen/pelvis: Right nephrostomy tube in place without   hydronephrosis. Stable superior left hydronephrosis. Marked irregular   bladder wall thickening, unchanged.  Progression of liver metastasis. Stable retroperitoneal adenopathy.    --- End of Report ---    < end of copied text >      Imaging Personally Reviewed:  YES    Consultant(s) Notes Reviewed:   YES    Plan of care was discussed with patient and /or primary care giver; all questions and concerns were addressed and care was aligned with patient's wishes.

## 2022-09-21 NOTE — PROGRESS NOTE ADULT - PROBLEM SELECTOR PLAN 2
Pt presenting with c/o UTI, complaining of dysuria and hematuria present in Nephrostomy bag  f/u Ucx and sensitivities  f/u Bcx  Will hold off Abx as of now as patient is afebrile and WBC's are normal     unclear if bacteriuria is colonization..   ID consulted - Dr. Rodriguez metastatic bladder CA   CT confirms progression of disease with mets to lung and liver   last chemo 05/2022  plan to continue treatment once discharged

## 2022-09-21 NOTE — PROGRESS NOTE ADULT - PROBLEM SELECTOR PLAN 1
P/W Hematuria in nephrostomy drainage bag   hx of multi drug resistant UTIs   started on cefepime per previous UCX  FU UCx  ID Rodriguez follows

## 2022-09-22 NOTE — PROGRESS NOTE ADULT - PROBLEM SELECTOR PROBLEM 1
12.6   22.34 )-----------( 189      ( 20 Apr 2020 22:06 )             38.7       04-20    139  |  102  |  15  ----------------------------<  147<H>  4.2   |  24  |  0.67    Ca    9.4      20 Apr 2020 22:06    TPro  7.2  /  Alb  4.1  /  TBili  0.5  /  DBili  x   /  AST  21  /  ALT  17  /  AlkPhos  52  04-20      < from: CT Abdomen and Pelvis w/ IV Cont (04.20.20 @ 22:57) >      EXAM:  CT ABDOMEN AND PELVIS IC                            PROCEDURE DATE:  04/20/2020            INTERPRETATION:  CLINICAL INFORMATION: Periumbilical abdominal pain    COMPARISON: None.    PROCEDURE:   CT of the Abdomen and Pelvis was performed with intravenous contrast.   Intravenous contrast: 90 ml Omnipaque 350. 10 ml discarded.  Oral contrast: None.  Sagittal and coronal reformats were performed.    FINDINGS:    LOWER CHEST: Within normal limits.    LIVER: Mild periportal edema. Subcentimeter hypodense foci scattered throughout the liver which are too small to characterize.  BILE DUCTS: Normal caliber.  GALLBLADDER: Within normal limits.  SPLEEN: Within normal limits.  PANCREAS: Within normal limits.  ADRENALS: Within normal limits.  KIDNEYS/URETERS: 1 cm left renal cyst. Symmetric enhancement. No hydronephrosis or obstructing calculi bilaterally.    BLADDER: Within normal limits.  REPRODUCTIVE ORGANS: Uterus and adnexa within normal limits.    BOWEL: Marked dilatation and hyperemia of the appendix. The appendix measures up to 17 mm at the base and 13 mm at the tip. There are intraluminal hyperdense contents including a 11 mm appendicolith at the base. There is marked right lower quadrant inflammatory change. There is no evidence of abscess or free intraperitoneal air.  PERITONEUM: Trace right lower quadrant ascites.  VESSELS: Within normal limits.  RETROPERITONEUM/LYMPH NODES: No lymphadenopathy.    ABDOMINAL WALL: Within normal limits.  BONES: Within normal limits.    IMPRESSION:     Acute appendicitis as described above. No evidence of perforation or abscess.      BATOOL GIFFORD M.D., RADIOLOGY RESIDENT  This document has been electronically signed.  JONO CORLEY M.D., ATTENDING RADIOLOGIST  Thisdocument has been electronically signed. Apr 21 2020 12:36AM    < end of copied text > Complicated UTI (urinary tract infection)

## 2022-09-22 NOTE — PROGRESS NOTE ADULT - SUBJECTIVE AND OBJECTIVE BOX
Patient is seen and examined at the bed side, is afebrile.  The Urine culture grew  Enterococcus species and blood cultures from 22 have no growth to date.      REVIEW OF SYSTEMS: All other review systems are negative      ALLERGIES: No Known Allergies      Vital Signs Last 24 Hrs  T(C): 36.6 (22 Sep 2022 13:45), Max: 36.7 (22 Sep 2022 05:27)  T(F): 97.9 (22 Sep 2022 13:45), Max: 98.1 (22 Sep 2022 05:27)  HR: 61 (22 Sep 2022 13:45) (61 - 66)  BP: 102/60 (22 Sep 2022 13:45) (101/65 - 105/60)  BP(mean): --  RR: 18 (22 Sep 2022 13:45) (18 - 18)  SpO2: 100% (22 Sep 2022 13:45) (100% - 100%)    Parameters below as of 22 Sep 2022 13:45  Patient On (Oxygen Delivery Method): room air      PHYSICAL EXAM:  GENERAL: Not in distress   CHEST/LUNG:  Not using accessory muscles   HEART: s1 and s2 present  ABDOMEN:  Nontender and  Nondistended  : Right Nephrostomy tube   EXTREMITIES: No pedal  edema  CNS: Awake and Alert      LABS:                        8.7    7.47  )-----------( 303      ( 22 Sep 2022 08:28 )             29.3                           8.4    8.28  )-----------( 291      ( 21 Sep 2022 06:10 )             29.2       -    139  |  105  |  20<H>  ----------------------------<  111<H>  4.2   |  25  |  0.94    Ca    10.2      22 Sep 2022 08:28    09-21    137  |  106  |  19<H>  ----------------------------<  76  4.1   |  23  |  1.00    Ca    10.6<H>      21 Sep 2022 06:10    TPro  8.4<H>  /  Alb  2.8<L>  /  TBili  0.1<L>  /  DBili  x   /  AST  19  /  ALT  11  /  AlkPhos  99        Urinalysis Basic - ( 20 Sep 2022 00:10 )  Color: Yellow / Appearance: Clear / S.015 / pH: x  Gluc: x / Ketone: Negative  / Bili: Negative / Urobili: Negative   Blood: x / Protein: 500 mg/dL / Nitrite: Negative   Leuk Esterase: Trace / RBC: 25-50 /HPF / WBC >50 /HPF   Sq Epi: x / Non Sq Epi: Few /HPF / Bacteria: Few /HPF        MEDICATIONS  (STANDING):    cefepime   IVPB 1000 milliGRAM(s) IV Intermittent every 8 hours  enoxaparin Injectable 40 milliGRAM(s) SubCutaneous every 24 hours  sodium chloride 0.9%. 1000 milliLiter(s) (80 mL/Hr) IV Continuous <Continuous>      RADIOLOGY & ADDITIONAL TESTS:    22: CT Abdomen and Pelvis No Cont (22 @ 01:34) Chest CT: Interval progression of cavitary pulmonary metastatic disease   with increased size and number of pulmonary nodules. No evidence of  pneumonia.    CT abdomen/pelvis: Right nephrostomy tube in place without  hydronephrosis. Stable superior left hydronephrosis. Marked irregular   bladder wall thickening, unchanged. Progression of liver metastasis. Stable retroperitoneal adenopathy.    MICROBIOLOGY DATA:   Culture - Blood (22 @ 05:50)   Specimen Source: .Blood Blood   Culture Results: No growth to date.     Culture - Blood (22 @ 05:35)   Specimen Source: .Blood Blood   Culture Results: No growth to date.     Culture - Urine (22 @ 00:10)   Specimen Source: Clean Catch Clean Catch (Midstream)   Culture Results:   >100,000 CFU/ml Enterococcus species     COVID-19 PCR . (22 @ 06:00)   COVID-19 PCR: NotDetec:"

## 2022-09-22 NOTE — PROGRESS NOTE ADULT - ASSESSMENT
_________________________________________________________________________________________  ========>>  M E D I C A L   A T T E N D I N G    F O L L O W  U P  N O T E  <<=========  -----------------------------------------------------------------------------------------------------    - Patient seen and examined by me earlier today.   - In summary,  JEF HOUSE is a 56y year old woman admitted with UTI  - Patient today overall doing ok, comfortable, eating fairly      pt with c/o bilateral flank pain but it is less today , pt ambulating..     ==================>> REVIEW OF SYSTEM <<=================    GEN: no fever, no chills, as above   RESP: no SOB, no cough, no sputum  CVS: chest pain as above ( related to left flank pain) , no palpitations  GI: no abdominal pain, no nausea, no more diarrhea reported   : no dysuria, no frequency.. no more blood tinged urin in bag   Neuro: no headache, no dizziness  Derm : no itching, no rash    ==================>> PHYSICAL EXAM <<=================    GEN: A&O X 2 , NAD , somewhat uncomfortable, pleasant, calm , cachectic   HEENT: NCAT, PERRL, MMM, hearing intact  Neck: supple , no JVD appreciated  CVS: S1S2 , regular , No M/R/G appreciated  PULM: CTA B/L,  no W/R/R appreciated  ABD.: soft. non tender, non distended,  bowel sounds present  Extrem: intact pulses , no edema      nephrostomy bag / leg bag draining clear yellow urine   PSYCH : normal mood,  not anxious                             ( Note written / Date of service 09-22-22 )    ==================>> MEDICATIONS <<====================    cefepime   IVPB 1000 milliGRAM(s) IV Intermittent every 8 hours  enoxaparin Injectable 40 milliGRAM(s) SubCutaneous every 24 hours  sodium chloride 0.9%. 1000 milliLiter(s) IV Continuous <Continuous>    MEDICATIONS  (PRN):  acetaminophen     Tablet .. 650 milliGRAM(s) Oral every 6 hours PRN Mild Pain (1 - 3)  traMADol 25 milliGRAM(s) Oral every 6 hours PRN Severe Pain (7 - 10)    ___________  Active diet:  Diet, Regular  ___________________    ==================>> VITAL SIGNS <<==================    Height (cm): 162.6  Weight (kg): 38.3  BMI (kg/m2): 14.5  Vital Signs Last 24 HrsT(C): 36.7 (09-22-22 @ 05:27)  T(F): 98.1 (09-22-22 @ 05:27), Max: 98.1 (09-22-22 @ 05:27)  HR: 65 (09-22-22 @ 05:27) (65 - 66)  BP: 105/60 (09-22-22 @ 05:27)  RR: 18 (09-22-22 @ 05:27) (18 - 18)  SpO2: 100% (09-22-22 @ 05:27) (100% - 100%)       ==================>> LAB AND IMAGING <<==================                        8.7    7.47  )-----------( 303      ( 22 Sep 2022 08:28 )             29.3        09-22    139  |  105  |  20<H>  ----------------------------<  111<H>  4.2   |  25  |  0.94    Ca    10.2      22 Sep 2022 08:28    WBC count:   7.47 <<== ,  8.28 <<== ,  8.52 <<== ,  8.14 <<==   Hemoglobin:   8.7 <<==,  8.4 <<==,  9.2 <<==,  8.9 <<==  platelets:  303 <==, 291 <==, 359 <==, 353 <==    Creatinine:  0.94  <<==, 1.00  <<==, 1.01  <<==, 1.00  <<==  Sodium:   139  <==, 137  <==, 138  <==, 136  <==    ____________________________    M I C R O B I O L O G Y :    Culture - Blood (collected 20 Sep 2022 05:50)  Source: .Blood Blood  Preliminary Report (21 Sep 2022 13:02):    No growth to date.    Culture - Blood (collected 20 Sep 2022 05:35)  Source: .Blood Blood  Preliminary Report (21 Sep 2022 13:02):    No growth to date.    Culture - Urine (collected 20 Sep 2022 00:10)  Source: Clean Catch Clean Catch (Midstream)  Preliminary Report (21 Sep 2022 14:25):    >100,000 CFU/ml Enterococcus species    _________________________________________________________________________________  ===============>>  A S S E S S M E N T   A N D   P L A N <<===============  ------------------------------------------------------------------------------------------    · Assessment	  56 year old female from home, with PMHX of Bladder cancer (previously on chemo until 5/2022) with  Dr. Renay Trinidad, chronic hydronephrosis  R nephrostomy tube, hyperparathyroidism, bradycardia with PPM coming in for hematuria. Of note patient with hx multidrug resistant UTI, recent nephrostomy tube exchange 8/22/22. Started on Cefepime based on previous UCX- ID Jennifer consulted. Pending urine culture   CT A/P: Right nephrostomy tube in place without hydronephrosis. Stable superior left hydronephrosis. Marked irregular bladder wall thickening, unchanged. Progression of liver metastasis. Hospital course complicated by chest pain, EKG with possible inferior ischemia- Dr Clark consulted     Problem/Plan - 1:  ·  Problem: Complicated UTI (urinary tract infection).   ·  Plan: P/W Hematuria in nephrostomy drainage bag   hx of multi drug resistant UTIs   started on cefepime per previous UCX  FU UCx / sensitivities   ID Rodriguez following  pain mgmt      Problem/Plan - 2:  ·  Problem: Metastatic cancer.   ·  Plan: metastatic bladder CA   CT confirms progression of disease with mets to lung and liver   last chemo 05/2022  plan to continue treatment once discharged.     Problem/Plan - 3:  ·  Problem: Atypical chest pain.  ·  Plan: c/p flank pain that radiates to chest   EKG with possible ischemia   Dr Rosario consulted.     Problem/Plan - 4:  ·  Problem: Hydronephrosis.  ·  Plan: s/p R nephrostomy tube exchange 08/2022  chronic stable left hydro   monitor urine output   trend BMP.     Problem/Plan - 5:  ·  Problem: Diarrhea.   now improved.  likely abx related   encouraged to increase PO intake      Problem/Plan - 6:  ·  Problem: Prophylactic measure.   ·  Plan: Lovenox for DVT ppx.     Problem/Plan - 7:  ·  Problem: Discharge planning issues.  ·  Plan: patient from home   will likely return home without needed   remains on IV ABx   pending UCX.    --------------------------------------------  Case discussed with pt, NP, ID,   Education given on findings and plan of care  ___________________________  H. NIEVES Ochoa.   Pager: [phone]

## 2022-09-22 NOTE — PROGRESS NOTE ADULT - PROBLEM SELECTOR PLAN 3
c/p flank pain that radiates to chest   Troponin neg x2  EKG with possible ischemia   Dr Rosario consulted Name band;

## 2022-09-22 NOTE — PROGRESS NOTE ADULT - ASSESSMENT
Patient is a 56y old  Female from home, with PMH of Anemia, Asthma, Bladder cancer (previously on chemo until 5/2022) with  Dr. Renay Trinidad, Right nephrostomy tube, hyperparathyroidism, bradycardia with PPM, now coming in for evaluation of chest pain, hematuria and diarrhea. She Also noticed that she had hematuria in her nephrostomy bag and a clot was passed into it. She was recently at [facility] for a UTI and was given 2g of ampicillin, after which she developed diarrhea. Additionally she has left flank pain. ON admission, she found to have no fever but positive Urine analysis. The CT A/P shows Right nephrostomy tube in place without hydronephrosis. Stable superior left hydronephrosis. Marked irregular bladder wall thickening, unchanged. Progression of liver metastasis. The ID consult requested to assist with further evaluation and antibiotic management.     # Recurrent Complicated UTI-  WBC >50 /HPF - ( 20 Sep 2022 00:10 )- UCx grew Enterococcus species form 9/20  # Right nephrostomy tube  #Metastatic Bladder cancer    would recommend:    1. Follow up final Urine culture for sensitivity of Enterococcus species   2. Discontinue Cefepime and start oral Linezolid 600 mg q 12hours since patient does not want Unasyn due to frequency ( Q 6 hours) because she believe IVF cause her kidney pain  3. Monitor h/h and transfuse as needed   4. Pain management as needed    d/w  Patient , DR. Ochoa, and covering NP, Sarah      Attending Attestation:    Spent more than 45 minutes on total encounter, more than 50 % of the visit was spent counseling and/or coordinating care by the Attending physician.

## 2022-09-22 NOTE — PROGRESS NOTE ADULT - PROBLEM SELECTOR PLAN 4
s/p R nephrostomy tube exchange 08/2022  chronic stable left hydro   monitor urine output   trend BMP

## 2022-09-22 NOTE — PROGRESS NOTE ADULT - SUBJECTIVE AND OBJECTIVE BOX
NP Note discussed with  primary attending    Patient is a 56y old  Female who presents with a chief complaint of Chest Pain, UTI (22 Sep 2022 14:15)      INTERVAL HPI/OVERNIGHT EVENTS: no new complaints    MEDICATIONS  (STANDING):  cefepime   IVPB 1000 milliGRAM(s) IV Intermittent every 8 hours  enoxaparin Injectable 40 milliGRAM(s) SubCutaneous every 24 hours  sodium chloride 0.9%. 1000 milliLiter(s) (80 mL/Hr) IV Continuous <Continuous>    MEDICATIONS  (PRN):  acetaminophen     Tablet .. 650 milliGRAM(s) Oral every 6 hours PRN Mild Pain (1 - 3)  traMADol 25 milliGRAM(s) Oral every 6 hours PRN Severe Pain (7 - 10)      __________________________________________________  REVIEW OF SYSTEMS:    CONSTITUTIONAL: No fever,   EYES: no acute visual disturbances  NECK: No pain or stiffness  RESPIRATORY: No cough; No shortness of breath  CARDIOVASCULAR: No chest pain, no palpitations  GASTROINTESTINAL: No pain. No nausea or vomiting; No diarrhea   NEUROLOGICAL: No headache or numbness, no tremors  MUSCULOSKELETAL: No joint pain, no muscle pain  GENITOURINARY: no dysuria, no frequency, no hesitancy  PSYCHIATRY: no depression , no anxiety  ALL OTHER  ROS negative        Vital Signs Last 24 Hrs  T(C): 36.7 (22 Sep 2022 05:27), Max: 36.7 (22 Sep 2022 05:27)  T(F): 98.1 (22 Sep 2022 05:27), Max: 98.1 (22 Sep 2022 05:27)  HR: 65 (22 Sep 2022 05:27) (65 - 66)  BP: 105/60 (22 Sep 2022 05:27) (101/65 - 105/60)  BP(mean): --  RR: 18 (22 Sep 2022 05:27) (18 - 18)  SpO2: 100% (22 Sep 2022 05:27) (100% - 100%)    Parameters below as of 22 Sep 2022 05:27  Patient On (Oxygen Delivery Method): room air        ________________________________________________  PHYSICAL EXAM:  GENERAL: NAD  HEENT: Normocephalic;  conjunctivae and sclerae clear; moist mucous membranes;   NECK : supple  CHEST/LUNG: Clear to ausculitation bilaterally with good air entry   HEART: S1 S2  regular; no murmurs, gallops or rubs  ABDOMEN: Soft, Nontender, Nondistended; Bowel sounds present  EXTREMITIES: no cyanosis; no edema; no calf tenderness  SKIN: warm and dry; no rash  NERVOUS SYSTEM:  Awake and alert; Oriented  to place, person and time ; no new deficits    _________________________________________________  LABS:                        8.7    7.47  )-----------( 303      ( 22 Sep 2022 08:28 )             29.3     -    139  |  105  |  20<H>  ----------------------------<  111<H>  4.2   |  25  |  0.94    Ca    10.2      22 Sep 2022 08:28        Urinalysis Basic - ( 21 Sep 2022 03:44 )    Color: Brown / Appearance: very cloudy / S.015 / pH: x  Gluc: x / Ketone: Negative  / Bili: Negative / Urobili: Negative   Blood: x / Protein: 500 mg/dL / Nitrite: Negative   Leuk Esterase: Small / RBC: >50 /HPF / WBC >50 /HPF   Sq Epi: x / Non Sq Epi: Few /HPF / Bacteria: Moderate /HPF      CAPILLARY BLOOD GLUCOSE            RADIOLOGY & ADDITIONAL TESTS:    Imaging Personally Reviewed:  YES/NO    Consultant(s) Notes Reviewed:   YES/ No    Care Discussed with Consultants :     Plan of care was discussed with patient and /or primary care giver; all questions and concerns were addressed and care was aligned with patient's wishes.     NP Note discussed with  primary attending    Patient is a 56y old  Female who presents with a chief complaint of Chest Pain, UTI (22 Sep 2022 14:15)      INTERVAL HPI/OVERNIGHT EVENTS: no new complaints    MEDICATIONS  (STANDING):  cefepime   IVPB 1000 milliGRAM(s) IV Intermittent every 8 hours  enoxaparin Injectable 40 milliGRAM(s) SubCutaneous every 24 hours  sodium chloride 0.9%. 1000 milliLiter(s) (80 mL/Hr) IV Continuous <Continuous>    MEDICATIONS  (PRN):  acetaminophen     Tablet .. 650 milliGRAM(s) Oral every 6 hours PRN Mild Pain (1 - 3)  traMADol 25 milliGRAM(s) Oral every 6 hours PRN Severe Pain (7 - 10)      __________________________________________________  REVIEW OF SYSTEMS:    CONSTITUTIONAL: No fever,   EYES: no acute visual disturbances  NECK: No pain or stiffness  RESPIRATORY: No cough; No shortness of breath  CARDIOVASCULAR: No chest pain, no palpitations  GASTROINTESTINAL: No pain. No nausea or vomiting; No diarrhea   NEUROLOGICAL: No headache or numbness, no tremors  MUSCULOSKELETAL: No joint pain, no muscle pain  GENITOURINARY: no dysuria, no frequency, no hesitancy  PSYCHIATRY: no depression , no anxiety  ALL OTHER  ROS negative        Vital Signs Last 24 Hrs  T(C): 36.7 (22 Sep 2022 05:27), Max: 36.7 (22 Sep 2022 05:27)  T(F): 98.1 (22 Sep 2022 05:27), Max: 98.1 (22 Sep 2022 05:27)  HR: 65 (22 Sep 2022 05:27) (65 - 66)  BP: 105/60 (22 Sep 2022 05:27) (101/65 - 105/60)  BP(mean): --  RR: 18 (22 Sep 2022 05:27) (18 - 18)  SpO2: 100% (22 Sep 2022 05:27) (100% - 100%)    Parameters below as of 22 Sep 2022 05:27  Patient On (Oxygen Delivery Method): room air        ________________________________________________  PHYSICAL EXAM:  GENERAL: NAD  HEENT: Normocephalic;  conjunctivae and sclerae clear; moist mucous membranes;   NECK : supple  CHEST/LUNG: Clear to ausculitation bilaterally with good air entry   HEART: S1 S2  regular; no murmurs, gallops or rubs  ABDOMEN: + right Nephrostomy tube, Soft, Nontender, Nondistended; Bowel sounds present  EXTREMITIES: no cyanosis; no edema; no calf tenderness  SKIN: warm and dry; no rash  NERVOUS SYSTEM:  Awake and alert; Oriented  to place, person and time ; no new deficits    _________________________________________________  LABS:                        8.7    7.47  )-----------( 303      ( 22 Sep 2022 08:28 )             29.3         139  |  105  |  20<H>  ----------------------------<  111<H>  4.2   |  25  |  0.94    Ca    10.2      22 Sep 2022 08:28        Urinalysis Basic - ( 21 Sep 2022 03:44 )    Color: Brown / Appearance: very cloudy / S.015 / pH: x  Gluc: x / Ketone: Negative  / Bili: Negative / Urobili: Negative   Blood: x / Protein: 500 mg/dL / Nitrite: Negative   Leuk Esterase: Small / RBC: >50 /HPF / WBC >50 /HPF   Sq Epi: x / Non Sq Epi: Few /HPF / Bacteria: Moderate /HPF      CAPILLARY BLOOD GLUCOSE            RADIOLOGY & ADDITIONAL TESTS:  < from: CT Abdomen and Pelvis No Cont (22 @ 01:34) >  IMPRESSION:  Chest CT: Interval progression of cavitary pulmonary metastatic disease   with increased size and number of pulmonary nodules. No evidence of   pneumonia.    CT abdomen/pelvis: Right nephrostomy tube in place without   hydronephrosis. Stable superior left hydronephrosis. Marked irregular   bladder wall thickening, unchanged.  Progression of liver metastasis. Stable retroperitoneal adenopathy.    --- End of Report ---    < end of copied text >  < from: CT Chest No Cont (22 @ 01:33) >  IMPRESSION:  Chest CT: Interval progression of cavitary pulmonary metastatic disease   with increased size and number of pulmonary nodules. No evidence of   pneumonia.    CT abdomen/pelvis: Right nephrostomy tube in place without   hydronephrosis. Stable superior left hydronephrosis. Marked irregular   bladder wall thickening, unchanged.  Progression of liver metastasis. Stable retroperitoneal adenopathy.    --- End of Report ---    < end of copied text >    Imaging Personally Reviewed:  YES    Consultant(s) Notes Reviewed:   YES    Care Discussed with Consultants :     Plan of care was discussed with patient and /or primary care giver; all questions and concerns were addressed and care was aligned with patient's wishes.

## 2022-09-22 NOTE — PROGRESS NOTE ADULT - PROBLEM SELECTOR PLAN 1
P/W Hematuria in nephrostomy drainage bag   hx of multi drug resistant UTIs   started on cefepime per previous UCX  Urine Cx growing Enterococcus specie  Pending sensitivity  ID Rodriguez following

## 2022-09-22 NOTE — PROGRESS NOTE ADULT - ASSESSMENT
56 year old female from home, with PMHX of Bladder cancer (previously on chemo until 5/2022) with  Dr. Renay Trinidad, chronic hydronephrosis  R nephrostomy tube, hyperparathyroidism, bradycardia with PPM coming in for hematuria. Of note patient with hx multidrug resistant UTI, recent nephrostomy tube exchange 8/22/22. Started on Cefepime based on previous UCX- FIFI Rodriguez consulted. Pending urine culture   CT A/P: Right nephrostomy tube in place without hydronephrosis. Stable superior left hydronephrosis. Marked irregular bladder wall thickening, unchanged. Progression of liver metastasis. Hospital course complicated by chest pain, EKG with possible inferior ischemia- Dr Clark consulted.  Patient seen this AM, complains 0f lower back pain, otherwise in NAD. Afebrile, no leukocytosis. Continue with Cefepime. Urine culture growing Enterococcus specie. Pending sensitivity result.

## 2022-09-22 NOTE — PROGRESS NOTE ADULT - PROBLEM SELECTOR PLAN 2
metastatic bladder CA   CT confirms progression of disease with mets to lung and liver   last chemo 05/2022  plan to continue treatment once discharged

## 2022-09-23 NOTE — PROGRESS NOTE ADULT - PROBLEM SELECTOR PLAN 6
pending collection for stool studies   Now endorses constipation since Monday and one "very small soft BM yesterday".  Started on Probiotics daily.

## 2022-09-23 NOTE — PROGRESS NOTE ADULT - SUBJECTIVE AND OBJECTIVE BOX
Patient is seen and examined at the bed side, is afebrile.  The Enterococcus in urine cx is sensitive to Ampicillin , but patient is refusing Unasyn or Ampicillin because dose sare 1 g and above. She wants  antibiotic  is under 1 g dose and agreed to Linezolid 600 mg  q 12hours IV while inpatient and does not want to go home with oral Linezolid , There fore, Oral Amoxicillin has been offered and she said she needs time to think before decide. The team already place the order for Linezolid. During the patient visit, Nurse Jessica accompanied me.      REVIEW OF SYSTEMS: All other review systems are negative      ALLERGIES: No Known Allergies      Vital Signs Last 24 Hrs  T(C): 36.4 (23 Sep 2022 13:59), Max: 36.7 (23 Sep 2022 05:09)  T(F): 97.5 (23 Sep 2022 13:59), Max: 98 (23 Sep 2022 05:09)  HR: 71 (23 Sep 2022 16:00) (70 - 73)  BP: 105/61 (23 Sep 2022 16:00) (101/55 - 110/39)  BP(mean): --  RR: 18 (23 Sep 2022 16:00) (16 - 18)  SpO2: 100% (23 Sep 2022 16:00) (100% - 100%)    Parameters below as of 23 Sep 2022 16:00  Patient On (Oxygen Delivery Method): room air        PHYSICAL EXAM:  GENERAL: Not in distress   CHEST/LUNG:  Not using accessory muscles   HEART: s1 and s2 present  ABDOMEN:  Nontender and  Nondistended  : Right Nephrostomy tube   EXTREMITIES: No pedal  edema  CNS: Awake and Alert      LABS:                        8.7    7.95  )-----------( 330      ( 23 Sep 2022 05:45 )             29.2                           8.7    7.47  )-----------( 303      ( 22 Sep 2022 08:28 )             29.3                09-23    138  |  105  |  21<H>  ----------------------------<  81  4.6   |  27  |  0.90    Ca    10.0      23 Sep 2022 05:45      09-22    139  |  105  |  20<H>  ----------------------------<  111<H>  4.2   |  25  |  0.94    Ca    10.2      22 Sep 2022 08:28    TPro  8.4<H>  /  Alb  2.8<L>  /  TBili  0.1<L>  /  DBili  x   /  AST  19  /  ALT  11  /  AlkPhos  99        Urinalysis Basic - ( 20 Sep 2022 00:10 )  Color: Yellow / Appearance: Clear / S.015 / pH: x  Gluc: x / Ketone: Negative  / Bili: Negative / Urobili: Negative   Blood: x / Protein: 500 mg/dL / Nitrite: Negative   Leuk Esterase: Trace / RBC: 25-50 /HPF / WBC >50 /HPF   Sq Epi: x / Non Sq Epi: Few /HPF / Bacteria: Few /HPF        MEDICATIONS  (STANDING):    enoxaparin Injectable 40 milliGRAM(s) SubCutaneous every 24 hours  lactobacillus acidophilus 1 Tablet(s) Oral daily  linezolid  IVPB 600 milliGRAM(s) IV Intermittent every 12 hours  sodium chloride 0.9%. 1000 milliLiter(s) (80 mL/Hr) IV Continuous <Continuous>      RADIOLOGY & ADDITIONAL TESTS:    22: CT Abdomen and Pelvis No Cont (22 @ 01:34) Chest CT: Interval progression of cavitary pulmonary metastatic disease   with increased size and number of pulmonary nodules. No evidence of  pneumonia.    CT abdomen/pelvis: Right nephrostomy tube in place without  hydronephrosis. Stable superior left hydronephrosis. Marked irregular   bladder wall thickening, unchanged. Progression of liver metastasis. Stable retroperitoneal adenopathy.    MICROBIOLOGY DATA:   Urine Microscopic-Add On (NC) (22 @ 03:44)   Comment - Urine: Many Ammorphous Urates   Epithelial Cells: Few /HPF   Red Blood Cell - Urine: >50 /HPF   White Blood Cell - Urine: >50 /HPF   Bacteria: Moderate /HPF     Culture - Urine (22 @ 00:10)   - Vancomycin: S 2   - Ciprofloxacin: S <=1   - Levofloxacin: S 2   - Nitrofurantoin: S <=32 Should not be used to treat pyelonephritis.   - Tetra/Doxy: R >8   - Ampicillin: S <=2 Predicts results to ampicillin/sulbactam, amoxacillin-clavulanate and piperacillin-tazobactam.   Specimen Source: Clean Catch Clean Catch (Midstream)   Culture Results:   >100,000 CFU/ml Enterococcus faecalis   Organism Identification: Enterococcus faecalis   Organism: Enterococcus faecalis   Method Type: PATRICIA     Culture - Blood (22 @ 05:50)   Specimen Source: .Blood Blood   Culture Results: No growth to date.     Culture - Blood (22 @ 05:35)   Specimen Source: .Blood Blood   Culture Results: No growth to date.     Culture - Urine (22 @ 00:10)   Specimen Source: Clean Catch Clean Catch (Midstream)   Culture Results:   >100,000 CFU/ml Enterococcus species     COVID-19 PCR . (22 @ 06:00)   COVID-19 PCR: NotDetec:"

## 2022-09-23 NOTE — DISCHARGE NOTE PROVIDER - PROVIDER TOKENS
PROVIDER:[TOKEN:[[id number]],FOLLOWUP:[1-3 days],ESTABLISHEDPATIENT:[T]],PROVIDER:[TOKEN:[[id number]],FOLLOWUP:[1-3 days],ESTABLISHEDPATIENT:[T]]

## 2022-09-23 NOTE — DISCHARGE NOTE PROVIDER - NSDCMRMEDTOKEN_GEN_ALL_CORE_FT
acetaminophen 325 mg oral tablet: 2 tab(s) orally every 6 hours, As needed, Temp greater or equal to 38C (100.4F), Mild Pain (1 - 3)   ferrous sulfate 325 mg (65 mg elemental iron) oral tablet: 1 tab(s) orally 3 times a day  folic acid 1 mg oral tablet: 1 tab(s) orally once a day  Multiple Vitamins oral tablet: 1 tab(s) orally once a day  traMADol 50 mg oral tablet: 0.5 tab(s) orally every 6 hours MDD:4   ferrous sulfate 325 mg (65 mg elemental iron) oral tablet: 1 tab(s) orally 3 times a day  folic acid 1 mg oral tablet: 1 tab(s) orally once a day  Multiple Vitamins oral tablet: 1 tab(s) orally once a day   Ferrex-150 oral capsule: 1 cap(s) orally once a day  folic acid 1 mg oral tablet: 1 tab(s) orally once a day  morphine 10 mg/5 mL oral solution: 2.5 milliliter(s) orally every 4 hours, As needed, Moderate and Severe pain    VIVO PRICE CHECK PAGER [id number] MDD:15mL  traMADol 50 mg oral tablet: 0.5 tab(s) orally  Tylenol:

## 2022-09-23 NOTE — DISCHARGE NOTE PROVIDER - NSFOLLOWUPCLINICS_GEN_ALL_ED_FT
[facility]  Gyne[facility] On[facility]  [address] [place] [address]  Phone: [phone]  Fax: [phone]  Follow Up Time: 1-3 days

## 2022-09-23 NOTE — PROGRESS NOTE ADULT - PROBLEM SELECTOR PLAN 2
metastatic bladder CA   CT confirms progression of disease with mets to lung and liver   last chemo 05/2022  plan to continue treatment once discharged  Plan to discharge on Tramadol 50 mg Q 6hrs PRN.

## 2022-09-23 NOTE — DISCHARGE NOTE PROVIDER - NSDCCPCAREPLAN_GEN_ALL_CORE_FT
PRINCIPAL DISCHARGE DIAGNOSIS  Diagnosis: Complicated UTI (urinary tract infection)  Assessment and Plan of Treatment: You prsented with blood in urine  in nephrostomy drainage bag   You have of multi drug resistant UTIs and   Urine Cx growing Enterococcus specie  You received antibiotics until  9/30 per ID recomendation        SECONDARY DISCHARGE DIAGNOSES  Diagnosis: Hydronephrosis  Assessment and Plan of Treatment: Your  Right  nephrostomy tube exchange 08/2022  You have chronic stable left hydro nephrosis      Diagnosis: Atypical chest pain  Assessment and Plan of Treatment: YOu had episodes of flank pain that radiates to chest   cardiac enzymes Troponin wer negative  EKG with possible ischemia likey due to your chronic disesease  You were followed by cardirologist      Diagnosis: Postmenopausal vaginal bleeding  Assessment and Plan of Treatment: You have episodes of on /off Postmenopausal vaginal bleeding. Your Hg was stable . you wer eSeen by GYN on 08/22  and at that time you deferred pelvic exam and pelvic sonogram. Please follow up outpatient at [facility].    Diagnosis: Iron deficiency anemia  Assessment and Plan of Treatment: You wer found to have iron defience, You were started on iron suplements    Diagnosis: Metastatic cancer  Assessment and Plan of Treatment: CT confirms progression of disease with mets to lung and liver   last chemo 05/2022  plan to continue treatment once discharged  Plan to discharge on Tramadol 50 mg Q 6hr as needed

## 2022-09-23 NOTE — DIETITIAN INITIAL EVALUATION ADULT - PERTINENT MEDS FT
MEDICATIONS  (STANDING):  enoxaparin Injectable 40 milliGRAM(s) SubCutaneous every 24 hours  lactobacillus acidophilus 1 Tablet(s) Oral daily  linezolid  IVPB 600 milliGRAM(s) IV Intermittent every 12 hours  sodium chloride 0.9%. 1000 milliLiter(s) (80 mL/Hr) IV Continuous <Continuous>    MEDICATIONS  (PRN):  acetaminophen     Tablet .. 650 milliGRAM(s) Oral every 6 hours PRN Mild Pain (1 - 3)  traMADol 50 milliGRAM(s) Oral every 6 hours PRN Moderate Pain (4 - 6)

## 2022-09-23 NOTE — DISCHARGE NOTE PROVIDER - DETAILS OF MALNUTRITION DIAGNOSIS/DIAGNOSES
This patient has been assessed with a concern for Malnutrition and was treated during this hospitalization for the following Nutrition diagnosis/diagnoses:     -  09/23/2022: Severe protein-calorie malnutrition   -  09/23/2022: Underweight (BMI < 19)

## 2022-09-23 NOTE — PROGRESS NOTE ADULT - ASSESSMENT
56 year old female from home, with PMHX of Bladder cancer (previously on chemo until 5/2022) with  Dr. Renay Trinidad, chronic hydronephrosis  R nephrostomy tube, hyperparathyroidism, bradycardia with PPM coming in for hematuria. Of note patient with hx multidrug resistant UTI, recent nephrostomy tube exchange 8/22/22. Started on Cefepime based on previous UCX- ID Jennifer consulted. Pending urine culture   CT A/P: Right nephrostomy tube in place without hydronephrosis. Stable superior left hydronephrosis. Marked irregular bladder wall thickening, unchanged. Progression of liver metastasis. Hospital course complicated by chest pain, EKG with possible inferior ischemia- Dr Clark consulted.  Patient seen this AM, complains 0f lower back pain, otherwise in NAD. Afebrile, no leukocytosis. Continue with Cefepime. Urine culture growing Enterococcus specie. Pending sensitivity result.  09/23: Cefepime discontinued and Linezolid 600 mg BID IV started. ID reccs appreciated. Plan to d/c home tomorrow on Linezolid  mg BID for a total of 7 days course. D/c plan pending MIRIAN betancourt for safe discharge as patient endorses she has no one to care for her in her home although patient reports she lives with 2 family members. Patient also does not feel that she should be discharged today as she does not know if she will develop a reaction to Linezolid and wants to be monitored. Discussed plan of care with ID and attending.

## 2022-09-23 NOTE — PROGRESS NOTE ADULT - ASSESSMENT
Patient is a 56y old  Female from home, with PMH of Anemia, Asthma, Bladder cancer (previously on chemo until 5/2022) with  Dr. Renay Trinidad, Right nephrostomy tube, hyperparathyroidism, bradycardia with PPM, now coming in for evaluation of chest pain, hematuria and diarrhea. She Also noticed that she had hematuria in her nephrostomy bag and a clot was passed into it. She was recently at [facility] [place] for a UTI and was given 2g of ampicillin, after which she developed diarrhea. Additionally she has left flank pain. ON admission, she found to have no fever but positive Urine analysis. The CT A/P shows Right nephrostomy tube in place without hydronephrosis. Stable superior left hydronephrosis. Marked irregular bladder wall thickening, unchanged. Progression of liver metastasis. The ID consult requested to assist with further evaluation and antibiotic management.     # Recurrent Complicated UTI-  WBC >50 /HPF - ( 20 Sep 2022 00:10 )- UCx grew Enterococcus species form 9/20-  patient does not want  Ampicillin OR Unasyn due to frequency ( Q 6 hours) because she believe IVF cause her kidney pain  # Right nephrostomy tube  # Metastatic Bladder cancer    would recommend:    1. IV Linezolid since patient agrees because dose under 1 g  2. May change to oral Amoxicillin on discharge to continue until 9/30/22  3. Monitor h/h and transfuse as needed   4. Pain management as needed    d/w DR. Ochoa, covering NP, Bhargavi Rogers and Nursing staff, Jessica  Also d/w patient regarding the risks of delaying the treatment       Attending Attestation:    Spent more than 35 minutes on total encounter, more than 50 % of the visit was spent counseling and/or coordinating care by the Attending physician.

## 2022-09-23 NOTE — PROGRESS NOTE ADULT - PROBLEM SELECTOR PLAN 1
P/W Hematuria in nephrostomy drainage bag   hx of multi drug resistant UTIs and hx of   Urine Cx growing Enterococcus specie  Sensitivity discussed with Dr. Rodriguez  Discontinued Cefepime (09/23) and started patient on Linezolid 600 mg BID IV (started on 09/23).    Plan to continue Linezolid IV for 24 hrs and switch to PO at discharge for a total of 7 days course.   FIFI Rodriguez following

## 2022-09-23 NOTE — DISCHARGE NOTE PROVIDER - HOSPITAL COURSE
56 year old female from home, with PMHX of Bladder cancer (previously on chemo until 5/2022) with  Dr. Renay Trinidad, chronic hydronephrosis  R nephrostomy tube, hyperparathyroidism, bradycardia with PPM coming in for hematuria. Of note patient with hx multidrug resistant UTI, recent nephrostomy tube exchange 8/22/22. Started on Cefepime based on previous UCX- ID Jennifer consulted. Pending urine culture   CT A/P: Right nephrostomy tube in place without hydronephrosis. Stable superior left hydronephrosis. Marked irregular bladder wall thickening, unchanged. Progression of liver metastasis. Hospital course complicated by chest pain, EKG with possible inferior ischemia- Dr Clark consulted. Cefepime switched to Zyvox IV. Urine culture growing Enterococcus specie.   Cefepime discontinued and Linezolid 600 mg BID IV started. ID reccs appreciated. Plan to d/c home tomorrow on Linezolid  mg BID for a total of 7 days course. D/c plan pending MIRIAN betancourt for safe discharge as patient endorses she has no one to care for her in her home although patient reports she lives with 2 family members. Patient also does not feel that she should be discharged today as she does not know if she will develop a reaction to Linezolid and wants to be monitored. Discussed plan of care with ID and attending.    56 year old female from home, with PMHX of Bladder cancer (previously on chemo until 5/2022) with  Dr. Renay Trinidad, chronic hydronephrosis  R nephrostomy tube, hyperparathyroidism, bradycardia with PPM coming in for hematuria. Of note patient with hx multidrug resistant UTI, recent nephrostomy tube exchange 8/22/22. Started on Cefepime based on previous UCX- ID Jennifer consulted. Pending urine culture   CT A/P: Right nephrostomy tube in place without hydronephrosis. Stable superior left hydronephrosis. Marked irregular bladder wall thickening, unchanged. Progression of liver metastasis. Hospital course complicated by chest pain, EKG with possible inferior ischemia- Dr Clark consulted. Cefepime switched to Zyvox IV. Urine culture growing Enterococcus specie.   Cefepime discontinued and Linezolid 600 mg BID IV started. ID reccs appreciated. Plan to d/c home tomorrow on Linezolid  mg BID for a total of 7 days course. D/c plan pending MIRIAN betancourt for safe discharge as patient endorses she has no one to care for her in her home although patient reports she lives with 2 family members. Patient also does not feel that she should be discharged today as she does not know if she will develop a reaction to Linezolid and wants to be monitored. Discussed plan of care with ID and attending.   - Pt appealed discharge x2 , pt refusing to discharge . 56 year old female from home, with PMHX of Bladder cancer (previously on chemo until 5/2022) with  Dr. Renay Trinidad, chronic hydronephrosis  R nephrostomy tube, hyperparathyroidism, bradycardia with PPM coming in for hematuria. Of note patient with hx multidrug resistant UTI, recent nephrostomy tube exchange 8/22/22. Started on Cefepime based on previous UCX- ID Jennifer consulted. Pending urine culture   CT A/P: Right nephrostomy tube in place without hydronephrosis. Stable superior left hydronephrosis. Marked irregular bladder wall thickening, unchanged. Progression of liver metastasis. Hospital course complicated by chest pain, EKG with possible inferior ischemia- Dr Clark consulted. Cefepime switched to Zyvox IV. Urine culture growing Enterococcus specie.   Cefepime discontinued and Linezolid 600 mg BID IV started. ID reccs appreciated. Plan to d/c home tomorrow on Linezolid  mg BID for a total of 7 days course. D/c plan pending MIRIAN betancourt for safe discharge as patient endorses she has no one to care for her in her home although patient reports she lives with 2 family members. Patient also does not feel that she should be discharged today as she does not know if she will develop a reaction to Linezolid and wants to be monitored. Discussed plan of care with ID and attending.   - Pt appealed discharge x2 , pt refusing discharge. 56 year old female from home, with PMHX of Bladder cancer (previously on chemo until 5/2022) with  Dr. Renay Trinidad, chronic hydronephrosis  R nephrostomy tube, hyperparathyroidism, bradycardia with PPM coming in for hematuria. Of note patient with hx multidrug resistant UTI, recent nephrostomy tube exchange 8/22/22. Started on Cefepime based on previous UCX- ID Jennifer consulted. Pending urine culture   CT A/P: Right nephrostomy tube in place without hydronephrosis. Stable superior left hydronephrosis. Marked irregular bladder wall thickening, unchanged. Progression of liver metastasis. Hospital course complicated by chest pain, EKG with possible inferior ischemia- Dr Clark consulted. Cefepime switched to Zyvox IV. Urine culture growing Enterococcus specie.   Cefepime discontinued and Linezolid 600 mg BID IV started. ID reccs appreciated. Plan to d/c home tomorrow on Linezolid  mg BID for a total of 7 days course. D/c plan pending MIRIAN betancourt for safe discharge as patient endorses she has no one to care for her in her home although patient reports she lives with 2 family members. Patient also does not feel that she should be discharged today as she does not know if she will develop a reaction to Linezolid and wants to be monitored. Discussed plan of care with ID and attending.   - Pt appealed discharge x2. patient is agreeable to discharge today 10/14.     Please refer to patient's complete medical chart with documents for a full hospital course, for this is only a brief summary.

## 2022-09-23 NOTE — PROGRESS NOTE ADULT - SUBJECTIVE AND OBJECTIVE BOX
NP Note discussed with  primary attending    Patient is a 56y old  Female who presents with a chief complaint of Chest Pain, UTI (22 Sep 2022 14:15).     INTERVAL HPI/OVERNIGHT EVENTS: Diffused abdominal pain.     MEDICATIONS  (STANDING):  cefepime   IVPB 1000 milliGRAM(s) IV Intermittent every 8 hours  enoxaparin Injectable 40 milliGRAM(s) SubCutaneous every 24 hours  sodium chloride 0.9%. 1000 milliLiter(s) (80 mL/Hr) IV Continuous <Continuous>    MEDICATIONS  (PRN):  acetaminophen     Tablet .. 650 milliGRAM(s) Oral every 6 hours PRN Mild Pain (1 - 3)  traMADol 25 milliGRAM(s) Oral every 6 hours PRN Severe Pain (7 - 10)    __________________________________________________  REVIEW OF SYSTEMS:    CONSTITUTIONAL: No fever, no chills.   EYES: no acute visual disturbances  NECK: No pain or stiffness  RESPIRATORY: No cough; No shortness of breath  CARDIOVASCULAR: No chest pain, no palpitations  GASTROINTESTINAL: Diffused abdominal pain 8/10.  No nausea or vomiting; No diarrhea. + constipation.   NEUROLOGICAL: No headache or numbness, no tremors  MUSCULOSKELETAL: + lower back pain b/l.   GENITOURINARY: + nephrostomy tube. Denies hematuria at this time. Endorses intermittent vaginal bleeding.   PSYCHIATRY: + anxiety regarding her chronic UTI, Nephrostomy tube and how it is interfering with her chemo.   ALL OTHER  ROS negative        Vital Signs Last 24 Hrs  T(C): 36.7 (22 Sep 2022 05:27), Max: 36.7 (22 Sep 2022 05:27)  T(F): 98.1 (22 Sep 2022 05:27), Max: 98.1 (22 Sep 2022 05:27)  HR: 65 (22 Sep 2022 05:27) (65 - 66)  BP: 105/60 (22 Sep 2022 05:27) (101/65 - 105/60)  BP(mean): --  RR: 18 (22 Sep 2022 05:27) (18 - 18)  SpO2: 100% (22 Sep 2022 05:27) (100% - 100%)    Parameters below as of 22 Sep 2022 05:27  Patient On (Oxygen Delivery Method): room air    ________________________________________________  PHYSICAL EXAM:  GENERAL: NAD, cachectic.    HEENT: Normocephalic;  conjunctivae and sclerae clear; moist mucous membranes;   NECK : supple  CHEST/LUNG: Clear to auscultation bilaterally with good air entry. On RA.   HEART: S1 S2  regular; no murmurs, gallops or rubs  ABDOMEN:  Soft, diffused tenderness to light palpation. Nondistended; Bowel sounds hypoactive.   : +  right Nephrostomy tube  EXTREMITIES: no cyanosis; no edema; no calf tenderness  SKIN: warm and dry; no rash  NERVOUS SYSTEM:  Awake and alert; Oriented  to place, person and time ; no new deficits.     _________________________________________________  LABS:    Basic Metabolic Panel in AM (22 @ 05:45)   Sodium, Serum: 138 mmol/L   Potassium, Serum: 4.6 mmol/L   Chloride, Serum: 105 mmol/L   Carbon Dioxide, Serum: 27 mmol/L   Anion Gap, Serum: 6 mmol/L   Blood Urea Nitrogen, Serum: 21 mg/dL   Creatinine, Serum: 0.90 mg/dL   Glucose, Serum: 81 mg/dL   Calcium, Total Serum: 10.0 mg/dL   eGFR: 76: The estimated glomerular filtration rate (eGFR) is calculated using the    CKD-EPI creatinine equation, which does not have a coefficient for   race and is validated in individuals 18 years of age and older (N Engl J   Med ; [id number]). Creatinine-based eGFR may be inaccurate in   various situations including but not limited to extremes of muscle mass,   altered dietary protein intake, or medications that affect renal tubular   creatinine secretion. mL/min/1.73m2     Complete Blood Count in AM (22 @ 05:45)   Nucleated RBC: 0 /100 WBCs   WBC Count: 7.95 K/uL   RBC Count: 3.34 M/uL   Hemoglobin: 8.7 g/dL   Hematocrit: 29.2 %   Mean Cell Volume: 87.4 fl   Mean Cell Hemoglobin: 26.0 pg   Mean Cell Hemoglobin Conc: 29.8 gm/dL   Red Cell Distrib Width: 14.6 %   Platelet Count - Automated: 330 K/uL Culture - Urine (22 @ 00:10)   - Ampicillin: S <=2 Predicts results to ampicillin/sulbactam, amoxacillin-clavulanate and piperacillin-tazobactam.   - Ciprofloxacin: S <=1   - Levofloxacin: S 2   - Nitrofurantoin: S <=32 Should not be used to treat pyelonephritis.   - Tetra/Doxy: R >8   - Vancomycin: S 2   Specimen Source: Clean Catch Clean Catch (Midstream)   Culture Results:   >100,000 CFU/ml Enterococcus faecalis   Organism Identification: Enterococcus faecalis   Organism: Enterococcus faecalis   Method Type: [facility]   Urinalysis Basic - ( 21 Sep 2022 03:44 )    Color: Brown / Appearance: very cloudy / S.015 / pH: x  Gluc: x / Ketone: Negative  / Bili: Negative / Urobili: Negative   Blood: x / Protein: 500 mg/dL / Nitrite: Negative   Leuk Esterase: Small / RBC: >50 /HPF / WBC >50 /HPF   Sq Epi: x / Non Sq Epi: Few /HPF / Bacteria: Moderate /HPF      CAPILLARY BLOOD GLUCOSE    RADIOLOGY & ADDITIONAL TESTS:  < from: CT Abdomen and Pelvis No Cont (22 @ 01:34) >  IMPRESSION:  Chest CT: Interval progression of cavitary pulmonary metastatic disease   with increased size and number of pulmonary nodules. No evidence of   pneumonia.    CT abdomen/pelvis: Right nephrostomy tube in place without   hydronephrosis. Stable superior left hydronephrosis. Marked irregular   bladder wall thickening, unchanged.  Progression of liver metastasis. Stable retroperitoneal adenopathy.    --- End of Report ---    < end of copied text >  < from: CT Chest No Cont (22 @ 01:33) >  IMPRESSION:  Chest CT: Interval progression of cavitary pulmonary metastatic disease   with increased size and number of pulmonary nodules. No evidence of   pneumonia.    CT abdomen/pelvis: Right nephrostomy tube in place without   hydronephrosis. Stable superior left hydronephrosis. Marked irregular   bladder wall thickening, unchanged.  Progression of liver metastasis. Stable retroperitoneal adenopathy.    --- End of Report ---    < end of copied text >    Imaging Personally Reviewed:  YES    Consultant(s) Notes Reviewed:   YES    Care Discussed with Consultants : ID    Plan of care was discussed with patient, attending and consultant. Patient's questions and concerns were addressed and care was aligned with patient's wishes.

## 2022-09-23 NOTE — DISCHARGE NOTE PROVIDER - NSDCCAREPROVSEEN_GEN_ALL_CORE_FT
Candelairo Ochoa Candelario Wilson  [facility]  Ordering Physician  Arti Andujar  User ADM  [facility] [place]  Team [facility] ACP - NP Service      [ Greater than 35 min spent for discharge services.   LUIS ANGEL Ochoa ]

## 2022-09-23 NOTE — PROGRESS NOTE ADULT - ASSESSMENT
_________________________________________________________________________________________  ========>>  M E D I C A L   A T T E N D I N G    F O L L O W  U P  N O T E  <<=========  -----------------------------------------------------------------------------------------------------    - Patient seen and examined by me earlier today.   - In summary,  JEF HOUSE is a 56y year old woman admitted with UTI  - Patient today overall doing ok, comfortable, eating fairly , had a small BM     bilateral flank pain > increased Tramadol to 50 ( pt declining other meds of further increase in dosage)  , pt ambulating at times    pt reports blood clot from vagina again x1 ( this was evaluated by GYN last admission )     ==================>> REVIEW OF SYSTEM <<=================    GEN: no fever, no chills, as above , weak  RESP: no SOB, no cough, no sputum  CVS: chest pain as above ( related to left flank pain) , no palpitations  GI: no abdominal pain, no nausea, no more diarrhea reported   : no dysuria, no frequency.. no more blood tinged urin in bag   Neuro: no headache, no dizziness  Derm : no itching, no rash    ==================>> PHYSICAL EXAM <<=================    GEN: A&O X 3 , NAD , somewhat uncomfortable, pleasant, calm , cachectic , weak appearing   HEENT: NCAT, PERRL, MMM, hearing intact  Neck: supple , no JVD appreciated  CVS: S1S2 , regular , No M/R/G appreciated  PULM: CTA B/L,  no W/R/R appreciated  ABD.: soft. non tender, non distended,  bowel sounds present  Extrem: intact pulses , no edema      nephrostomy bag / leg bag draining clear yellow urine   PSYCH : normal mood,  not anxious                             ( Note written / Date of service 09-23-22 )    ==================>> MEDICATIONS <<====================    cefepime   IVPB 1000 milliGRAM(s) IV Intermittent every 8 hours  enoxaparin Injectable 40 milliGRAM(s) SubCutaneous every 24 hours  sodium chloride 0.9%. 1000 milliLiter(s) IV Continuous <Continuous>    MEDICATIONS  (PRN):  acetaminophen     Tablet .. 650 milliGRAM(s) Oral every 6 hours PRN Mild Pain (1 - 3)  traMADol 50 milliGRAM(s) Oral every 6 hours PRN Moderate Pain (4 - 6)    ___________  Active diet:  Diet, Regular  ___________________    ==================>> VITAL SIGNS <<==================    Vital Signs Last 24 HrsT(C): 36.7 (09-23-22 @ 05:09)  T(F): 98 (09-23-22 @ 05:09), Max: 98 (09-23-22 @ 05:09)  HR: 73 (09-23-22 @ 05:09) (61 - 73)  BP: 101/55 (09-23-22 @ 05:09)  RR: 16 (09-23-22 @ 05:09) (16 - 18)  SpO2: 100% (09-23-22 @ 05:09) (100% - 100%)         ==================>> LAB AND IMAGING <<==================                        8.7    7.95  )-----------( 330      ( 23 Sep 2022 05:45 )             29.2        09-23    138  |  105  |  21<H>  ----------------------------<  81  4.6   |  27  |  0.90    Ca    10.0      23 Sep 2022 05:45      WBC count:   7.95 <<== ,  7.47 <<== ,  8.28 <<== ,  8.52 <<== ,  8.14 <<==   Hemoglobin:   8.7 <<==,  8.7 <<==,  8.4 <<==,  9.2 <<==,  8.9 <<==  platelets:  330 <==, 303 <==, 291 <==, 359 <==, 353 <==    Creatinine:  0.90  <<==, 0.94  <<==, 1.00  <<==, 1.01  <<==, 1.00  <<==  Sodium:   138  <==, 139  <==, 137  <==, 138  <==, 136  <==    ____________________________    M I C R O B I O L O G Y :    Culture - Blood (collected 20 Sep 2022 05:50)  Source: .Blood Blood  Preliminary Report (21 Sep 2022 13:02):    No growth to date.    Culture - Blood (collected 20 Sep 2022 05:35)  Source: .Blood Blood  Preliminary Report (21 Sep 2022 13:02):    No growth to date.    Culture - Urine (collected 20 Sep 2022 00:10)  Source: Clean Catch Clean Catch (Midstream)  Final Report (22 Sep 2022 20:23):    >100,000 CFU/ml Enterococcus faecalis  Organism: Enterococcus faecalis (22 Sep 2022 20:23)  Organism: Enterococcus faecalis (22 Sep 2022 20:23)    Sensitivities:      -  Ampicillin: S <=2 Predicts results to ampicillin/sulbactam, amoxacillin-clavulanate and  piperacillin-tazobactam.      -  Ciprofloxacin: S <=1      -  Levofloxacin: S 2      -  Nitrofurantoin: S <=32 Should not be used to treat pyelonephritis.      -  Tetra/Doxy: R >8      -  Vancomycin: S 2      Method Type: PATRICIA    COVID-19 PCR: NotDetec (09-22-22 @ 13:40)  COVID-19 PCR: NotDetec (09-20-22 @ 06:00)    _________________________________________________________________________________  ===============>>  A S S E S S M E N T   A N D   P L A N <<===============  ------------------------------------------------------------------------------------------    · Assessment	  56 year old female from home, with PMHX of Bladder cancer (previously on chemo until 5/2022) with  Dr. Renay Trinidad, chronic hydronephrosis  R nephrostomy tube, hyperparathyroidism, bradycardia with PPM coming in for hematuria. Of note patient with hx multidrug resistant UTI, recent nephrostomy tube exchange 8/22/22. Started on Cefepime based on previous UCX- FIFI Rodriguez consulted. Pending urine culture   CT A/P: Right nephrostomy tube in place without hydronephrosis. Stable superior left hydronephrosis. Marked irregular bladder wall thickening, unchanged. Progression of liver metastasis. Hospital course complicated by chest pain, EKG with possible inferior ischemia- Dr Clark consulted     Problem/Plan - 1:  ·  Problem: Complicated UTI (urinary tract infection).   ·  Plan: P/W Hematuria in nephrostomy drainage bag   hx of multi drug resistant UTIs   started on cefepime >> zyvox per ID > ? duration   ID Rodriguez following  pain mgmt as above      Problem/Plan - 2:  ·  Problem: Metastatic cancer.   ·  Plan: metastatic bladder CA   CT confirms progression of disease with mets to lung and liver   last chemo 05/2022  plan to continue treatment once discharged.  f/u onc as OP     Problem/Plan - 3:  ·  Problem: Atypical chest pain.  ·  Plan: c/p flank pain that radiates to chest   monitor      Problem/Plan - 4:  ·  Problem: Hydronephrosis.  ·  Plan: s/p R nephrostomy tube exchange 08/2022  chronic stable left hydro   monitor urine output   trend BMP.     Problem/Plan - 5:  ·  Problem: Diarrhea.   now improved.  likely abx related   encouraged to increase PO intake      Problem/Plan - 6:  ·  Problem: Prophylactic measure.   ·  Plan: Lovenox for DVT ppx.     Problem/Plan - 7:  ·  Problem: Discharge planning issues.  ·  Plan: patient from home   will likely return home without needed   case management to follow up re safety as pt states dosn't have anyone to help her at home now     --------------------------------------------  Case discussed with pt, NP, ID,   Education given on findings and plan of care  ___________________________  H. NIEVES Ochoa.   Pager: [phone]

## 2022-09-23 NOTE — DISCHARGE NOTE PROVIDER - CARE PROVIDER_API CALL
Renay Trinidad)  Urology  [address] [place] [address]  Phone: [phone]  Fax: [phone]  Established Patient  Follow Up Time: 1-3 days    Priscila Rosario  CARDIOVASCULAR DISEASE  [address] [place] [address], [address]  [place] [address]  Phone: [phone]  Fax: [phone]  Established Patient  Follow Up Time: 1-3 days

## 2022-09-23 NOTE — PROGRESS NOTE ADULT - PROBLEM SELECTOR PLAN 3
c/p flank pain that radiates to chest   Troponin neg x2  EKG with possible ischemia   Dr Rosario consulted

## 2022-09-23 NOTE — PROGRESS NOTE ADULT - PROBLEM SELECTOR PLAN 5
Endorses constipation since Monday and one "very small soft BM yesterday".  Started on Miralax.  Monitor for diarrhea.   Encouraged PO fluid intake.   Stool count

## 2022-09-23 NOTE — DIETITIAN INITIAL EVALUATION ADULT - PERTINENT LABORATORY DATA
09-23    138  |  105  |  21<H>  ----------------------------<  81  4.6   |  27  |  0.90    Ca    10.0      23 Sep 2022 05:45    A1C with Estimated Average Glucose Result: 5.6 % (06-29-22 @ 13:49)  A1C with Estimated Average Glucose Result: 5.7 % (06-28-22 @ 12:08)  A1C with Estimated Average Glucose Result: 5.5 % (01-16-22 @ 18:39)

## 2022-09-23 NOTE — DIETITIAN INITIAL EVALUATION ADULT - OTHER INFO
Unable To provide information on wt loss PTA. admitted for hematuria, has history of bladder cancer, On chemotherapy till 5/2022, chronic hydronephrosis, R  nephrostomy tube. metastatic cancer. ordered for a Regular diet and tolerating consistency. however, patient cannot tolerate milk, acidic foods, No chocolate due TO chemotherapy . will inform kitchen . will diagnose patient as severely malnourished in chronic disease in view of low po intake PTA and results of NFPE. declines OS as cannot tolerate

## 2022-09-24 NOTE — PROGRESS NOTE ADULT - ASSESSMENT
seen and examined vsstable afebrile physical done   ok   covering for Dr cannon   HPI:  This is a 56 year old female from home, with PMH of Anemia, Asthma, Bladder cancer (previously on chemo until 5/2022) with  Dr. Renay Trinidad, R nephrostomy tube, hyperparathyroidism, bradycardia with PPM coming in for chest pain, hematuria and diarrhea. Pt states that chest pain started 2 days ago, was sharp in nature, 7/10, relieved with tylenol. She Also noticed that she had hematuria in her nephrostomy bag and a clot was passed into it. She was recently at [facility] for a UTI and was given 2g of ampicillin, after which she developed diarrhea. Additionally she has left flank pain now.   admitted for UTI  ecoli UTI  on zyvox   c/o sometimes constipationa nd sometimes diarrhea   GI out pt

## 2022-09-24 NOTE — PROGRESS NOTE ADULT - SUBJECTIVE AND OBJECTIVE BOX
Patient is seen and examined at the bed side, is afebrile.  She is tolerating Linezolid well, cell counts are normal.       REVIEW OF SYSTEMS: All other review systems are negative      ALLERGIES: No Known Allergies      Vital Signs Last 24 Hrs  T(C): 36.6 (24 Sep 2022 14:01), Max: 36.6 (24 Sep 2022 14:01)  T(F): 97.8 (24 Sep 2022 14:), Max: 97.8 (24 Sep 2022 14:)  HR: 70 (24 Sep 2022 14:) (65 - 70)  BP: 99/58 (24 Sep 2022 14:) (99/58 - 126/64)  BP(mean): --  RR: 17 (24 Sep 2022 14:) (17 - 18)  SpO2: 100% (24 Sep 2022 14:) (96% - 100%)    Parameters below as of 24 Sep 2022 14:  Patient On (Oxygen Delivery Method): room air        PHYSICAL EXAM:  GENERAL: Not in distress   CHEST/LUNG:  Not using accessory muscles   HEART: s1 and s2 present  ABDOMEN:  Nontender and  Nondistended  : Right Nephrostomy tube   EXTREMITIES: No pedal  edema  CNS: Awake and Alert      LABS:                        8.2    7.86  )-----------( 300      ( 24 Sep 2022 07:25 )             27.8                           8.7    7.95  )-----------( 330      ( 23 Sep 2022 05:45 )             29.2       09-24    137  |  101  |  18  ----------------------------<  97  4.4   |  27  |  0.95    Ca    11.0<H>      24 Sep 2022 07:25  Phos  2.9       Mg     2.3     24    TPro  7.2  /  Alb  2.5<L>  /  TBili  0.2  /  DBili  x   /  AST  15  /  ALT  13  /  AlkPhos  89  -24             09-23    138  |  105  |  21<H>  ----------------------------<  81  4.6   |  27  |  0.90    Ca    10.0      23 Sep 2022 05:45    TPro  8.4<H>  /  Alb  2.8<L>  /  TBili  0.1<L>  /  DBili  x   /  AST  19  /  ALT  11  /  AlkPhos  99        Urinalysis Basic - ( 20 Sep 2022 00:10 )  Color: Yellow / Appearance: Clear / S.015 / pH: x  Gluc: x / Ketone: Negative  / Bili: Negative / Urobili: Negative   Blood: x / Protein: 500 mg/dL / Nitrite: Negative   Leuk Esterase: Trace / RBC: 25-50 /HPF / WBC >50 /HPF   Sq Epi: x / Non Sq Epi: Few /HPF / Bacteria: Few /HPF        MEDICATIONS  (STANDING):    enoxaparin Injectable 40 milliGRAM(s) SubCutaneous every 24 hours  lactobacillus acidophilus 1 Tablet(s) Oral daily  linezolid  IVPB 600 milliGRAM(s) IV Intermittent every 12 hours  senna 2 Tablet(s) Oral at bedtime  sodium chloride 0.9%. 1000 milliLiter(s) (80 mL/Hr) IV Continuous <Continuous>      RADIOLOGY & ADDITIONAL TESTS:    22: CT Abdomen and Pelvis No Cont (22 @ 01:34) Chest CT: Interval progression of cavitary pulmonary metastatic disease   with increased size and number of pulmonary nodules. No evidence of  pneumonia.    CT abdomen/pelvis: Right nephrostomy tube in place without  hydronephrosis. Stable superior left hydronephrosis. Marked irregular   bladder wall thickening, unchanged. Progression of liver metastasis. Stable retroperitoneal adenopathy.    MICROBIOLOGY DATA:   Urine Microscopic-Add On (NC) (22 @ 03:44)   Comment - Urine: Many Ammorphous Urates   Epithelial Cells: Few /HPF   Red Blood Cell - Urine: >50 /HPF   White Blood Cell - Urine: >50 /HPF   Bacteria: Moderate /HPF     Culture - Urine (22 @ 00:10)   - Vancomycin: S 2   - Ciprofloxacin: S <=1   - Levofloxacin: S 2   - Nitrofurantoin: S <=32 Should not be used to treat pyelonephritis.   - Tetra/Doxy: R >8   - Ampicillin: S <=2 Predicts results to ampicillin/sulbactam, amoxacillin-clavulanate and piperacillin-tazobactam.   Specimen Source: Clean Catch Clean Catch (Midstream)   Culture Results:   >100,000 CFU/ml Enterococcus faecalis   Organism Identification: Enterococcus faecalis   Organism: Enterococcus faecalis   Method Type: PATRICIA     Culture - Blood (22 @ 05:50)   Specimen Source: .Blood Blood   Culture Results: No growth to date.     Culture - Blood (22 @ 05:35)   Specimen Source: .Blood Blood   Culture Results: No growth to date.     Culture - Urine (22 @ 00:10)   Specimen Source: Clean Catch Clean Catch (Midstream)   Culture Results:   >100,000 CFU/ml Enterococcus species     COVID-19 PCR . (22 @ 06:00)   COVID-19 PCR: NotDetec:"

## 2022-09-24 NOTE — PROGRESS NOTE ADULT - ASSESSMENT
Patient is a 56y old  Female from home, with PMH of Anemia, Asthma, Bladder cancer (previously on chemo until 5/2022) with  Dr. Renay Trinidad, Right nephrostomy tube, hyperparathyroidism, bradycardia with PPM, now coming in for evaluation of chest pain, hematuria and diarrhea. She Also noticed that she had hematuria in her nephrostomy bag and a clot was passed into it. She was recently at [facility] for a UTI and was given 2g of ampicillin, after which she developed diarrhea. Additionally she has left flank pain. ON admission, she found to have no fever but positive Urine analysis. The CT A/P shows Right nephrostomy tube in place without hydronephrosis. Stable superior left hydronephrosis. Marked irregular bladder wall thickening, unchanged. Progression of liver metastasis. The ID consult requested to assist with further evaluation and antibiotic management.     # Recurrent Complicated UTI-  WBC >50 /HPF - ( 20 Sep 2022 00:10 )- UCx grew Enterococcus species form 9/20-  patient does not want  Ampicillin OR Unasyn due to frequency ( Q 6 hours) because she believe more IVF cause her kidney pain  # Right nephrostomy tube  # Metastatic Bladder cancer    would recommend:    1. IV Linezolid inpatient and monitor cell count closely   2. May change to oral Amoxicillin 500 mg q 8hours on discharge to continue until 9/30/22  3. Monitor h/h and transfuse as needed   4. Pain management as needed    d/w Patient and Nursing staff     Attending Attestation:    Spent more than 35 minutes on total encounter, more than 50 % of the visit was spent counseling and/or coordinating care by the Attending physician.

## 2022-09-24 NOTE — PROGRESS NOTE ADULT - SUBJECTIVE AND OBJECTIVE BOX
HPI:  This is a 56 year old female from home, with PMH of Anemia, Asthma, Bladder cancer (previously on chemo until 5/2022) with  Dr. Renay Trinidad, R nephrostomy tube, hyperparathyroidism, bradycardia with PPM coming in for chest pain, hematuria and diarrhea. Pt states that chest pain started 2 days ago, was sharp in nature, 7/10, relieved with tylenol. She Also noticed that she had hematuria in her nephrostomy bag and a clot was passed into it. She was recently at [facility] for a UTI and was given 2g of ampicillin, after which she developed diarrhea. Additionally she has left flank pain now.     In ED v/s: /70, HR 66, RR 16, T 98.3    CT A/P: Right nephrostomy tube in place without hydronephrosis. Stable superior left hydronephrosis. Marked irregular bladder wall thickening, unchanged. Progression of liver metastasis.  (20 Sep 2022 04:50)      Patient is a 56y old  Female who presents with a chief complaint of Chest Pain, UTI (24 Sep 2022 12:33)      INTERVAL HPI/OVERNIGHT EVENTS:  T(C): 36.6 (09-24-22 @ 14:01), Max: 36.6 (09-24-22 @ 14:01)  HR: 70 (09-24-22 @ 14:01) (65 - 70)  BP: 99/58 (09-24-22 @ 14:01) (99/58 - 126/64)  RR: 17 (09-24-22 @ 14:01) (17 - 18)  SpO2: 100% (09-24-22 @ 14:01) (96% - 100%)  Wt(kg): --  I&O's Summary    23 Sep 2022 07:01  -  24 Sep 2022 07:00  --------------------------------------------------------  IN: 0 mL / OUT: 450 mL / NET: -450 mL        REVIEW OF SYSTEMS: denies fever, chills, SOB, palpitations, chest pain, abdominal pain, nausea, vomitting, diarrhea, constipation, dizziness    MEDICATIONS  (STANDING):  enoxaparin Injectable 40 milliGRAM(s) SubCutaneous every 24 hours  lactobacillus acidophilus 1 Tablet(s) Oral daily  linezolid  IVPB 600 milliGRAM(s) IV Intermittent every 12 hours  sodium chloride 0.9%. 1000 milliLiter(s) (80 mL/Hr) IV Continuous <Continuous>    MEDICATIONS  (PRN):  acetaminophen     Tablet .. 650 milliGRAM(s) Oral every 6 hours PRN Mild Pain (1 - 3)  traMADol 50 milliGRAM(s) Oral every 6 hours PRN Moderate Pain (4 - 6)      PHYSICAL EXAM:  GENERAL: NAD, well-groomed, well-developed  HEAD:  Atraumatic, Normocephalic  EYES: EOMI, PERRLA, conjunctiva and sclera clear  ENMT: No tonsillar erythema, exudates, or enlargement; Moist mucous membranes, Good dentition, No lesions  NECK: Supple, No JVD, Normal thyroid  NERVOUS SYSTEM:  Alert & Oriented X3, Good concentration; Motor Strength 5/5 B/L upper and lower extremities; DTRs 2+ intact and symmetric  CHEST/LUNG: Clear to percussion bilaterally; No rales, rhonchi, wheezing, or rubs  HEART: Regular rate and rhythm; No murmurs, rubs, or gallops  ABDOMEN: Soft, Nontender, Nondistended; Bowel sounds present  EXTREMITIES:  2+ Peripheral Pulses, No clubbing, cyanosis, or edema  LYMPH: No lymphadenopathy noted  SKIN: No rashes or lesions  LABS:                        8.2    7.86  )-----------( 300      ( 24 Sep 2022 07:25 )             27.8     09-24    137  |  101  |  18  ----------------------------<  97  4.4   |  27  |  0.95    Ca    11.0<H>      24 Sep 2022 07:25  Phos  2.9     09-24  Mg     2.3     09-24    TPro  7.2  /  Alb  2.5<L>  /  TBili  0.2  /  DBili  x   /  AST  15  /  ALT  13  /  AlkPhos  89  09-24        CAPILLARY BLOOD GLUCOSE

## 2022-09-25 NOTE — PROGRESS NOTE ADULT - ASSESSMENT
Patient is a 56y old  Female from home, with PMH of Anemia, Asthma, Bladder cancer (previously on chemo until 5/2022) with  Dr. Renay Trinidad, Right nephrostomy tube, hyperparathyroidism, bradycardia with PPM, now coming in for evaluation of chest pain, hematuria and diarrhea. She Also noticed that she had hematuria in her nephrostomy bag and a clot was passed into it. She was recently at [facility] for a UTI and was given 2g of ampicillin, after which she developed diarrhea. Additionally she has left flank pain. ON admission, she found to have no fever but positive Urine analysis. The CT A/P shows Right nephrostomy tube in place without hydronephrosis. Stable superior left hydronephrosis. Marked irregular bladder wall thickening, unchanged. Progression of liver metastasis. The ID consult requested to assist with further evaluation and antibiotic management.     # Recurrent Complicated UTI-  WBC >50 /HPF - ( 20 Sep 2022 00:10 )- UCx grew Enterococcus species form 9/20-  patient does not want  Ampicillin OR Unasyn due to frequency ( Q 6 hours) because she believe more IVF cause her kidney pain  # Right nephrostomy tube  # Metastatic Bladder cancer    would recommend:    1. Continue Bowel regimen for constipation   2. IV Linezolid inpatient and monitor cell count closely   3. May change to oral Amoxicillin 500 mg q 8hours on discharge to continue until 9/30/22  4. Pain management as needed    d/w Patient and Nursing staff     Attending Attestation:    Spent more than 35 minutes on total encounter, more than 50 % of the visit was spent counseling and/or coordinating care by the Attending physician.

## 2022-09-25 NOTE — PROGRESS NOTE ADULT - ASSESSMENT
_________________________________________________________________________________________  ========>>  M E D I C A L   A T T E N D I N G    F O L L O W  U P  N O T E  <<=========  -----------------------------------------------------------------------------------------------------    - Patient seen and examined by me earlier today.   - In summary,  JEF HOUSE is a 56y year old woman admitted with UTI  - Patient today overall doing ok, comfortable, eating fairly , pain better controlled, asking for suppository       no more blood clot from vagina again reported     ==================>> REVIEW OF SYSTEM <<=================    GEN: no fever, no chills, as above , weak  RESP: no SOB, no cough, no sputum  CVS: chest pain as above ( related to left flank pain) , no palpitations  GI: no abdominal pain, no nausea, no more diarrhea reported   : no dysuria, no frequency.. no more blood tinged urin in bag   Neuro: no headache, no dizziness  Derm : no itching, no rash    ==================>> PHYSICAL EXAM <<=================    GEN: A&O X 3 , NAD , somewhat uncomfortable, pleasant, calm , cachectic , weak appearing   HEENT: NCAT, PERRL, MMM, hearing intact  Neck: supple , no JVD appreciated  CVS: S1S2 , regular , No M/R/G appreciated  PULM: CTA B/L,  no W/R/R appreciated  ABD.: soft. non tender, non distended,  bowel sounds present  Extrem: intact pulses , no edema      nephrostomy bag / leg bag draining clear yellow urine   PSYCH : normal mood,  not anxious                             ( note written / Date of service   09-25-22 )    ==================>> MEDICATIONS <<====================    enoxaparin Injectable 40 milliGRAM(s) SubCutaneous every 24 hours  lactobacillus acidophilus 1 Tablet(s) Oral daily  linezolid  IVPB 600 milliGRAM(s) IV Intermittent every 12 hours  senna 2 Tablet(s) Oral at bedtime  sodium chloride 0.9%. 1000 milliLiter(s) IV Continuous <Continuous>    MEDICATIONS  (PRN):  acetaminophen     Tablet .. 650 milliGRAM(s) Oral every 6 hours PRN Mild Pain (1 - 3)  traMADol 50 milliGRAM(s) Oral every 6 hours PRN Moderate Pain (4 - 6)    ___________  Active diet:  Diet, Regular  ___________________    ==================>> VITAL SIGNS <<==================     Vital Signs Last 24 HrsT(C): 36.4 (09-25-22 @ 05:10)  T(F): 97.5 (09-25-22 @ 05:10), Max: 98.5 (09-24-22 @ 20:49)  HR: 63 (09-25-22 @ 05:10) (63 - 74)  BP: 112/68 (09-25-22 @ 05:10)  RR: 17 (09-25-22 @ 05:10) (17 - 17)  SpO2: 100% (09-25-22 @ 05:10) (100% - 100%)       ==================>> LAB AND IMAGING <<==================                        8.2    7.86  )-----------( 300      ( 24 Sep 2022 07:25 )             27.8        09-24    137  |  101  |  18  ----------------------------<  97  4.4   |  27  |  0.95    Ca    11.0<H>      24 Sep 2022 07:25  Phos  2.9     09-24  Mg     2.3     09-24    TPro  7.2  /  Alb  2.5<L>  /  TBili  0.2  /  DBili  x   /  AST  15  /  ALT  13  /  AlkPhos  89  09-24    _________________________________________________________________________________  ===============>>  A S S E S S M E N T   A N D   P L A N <<===============  ------------------------------------------------------------------------------------------    · Assessment	  56 year old female from home, with PMHX of Bladder cancer (previously on chemo until 5/2022) with  Dr. Renay Trinidad, chronic hydronephrosis  R nephrostomy tube, hyperparathyroidism, bradycardia with PPM coming in for hematuria. Of note patient with hx multidrug resistant UTI, recent nephrostomy tube exchange 8/22/22. Started on Cefepime based on previous UCX- ID Jennifer consulted. Pending urine culture   CT A/P: Right nephrostomy tube in place without hydronephrosis. Stable superior left hydronephrosis. Marked irregular bladder wall thickening, unchanged. Progression of liver metastasis. Hospital course complicated by chest pain, EKG with possible inferior ischemia- Dr Clark consulted     Problem/Plan - 1:  ·  Problem: Complicated UTI (urinary tract infection).   ·  Plan: P/W Hematuria in nephrostomy drainage bag   hx of multi drug resistant UTIs   on zyvox per ID > ? duration   ID Rodriguez following  pain mgmt as above      Problem/Plan - 2:  ·  Problem: Metastatic cancer.   ·  Plan: metastatic bladder CA   CT confirms progression of disease with mets to lung and liver   last chemo 05/2022  plan to continue treatment once discharged.  f/u onc as OP     Problem/Plan - 3:  ·  Problem: Atypical chest pain.  ·  Plan: c/p flank pain that radiates to chest   monitor      Problem/Plan - 4:  ·  Problem: Hydronephrosis.  ·  Plan: s/p R nephrostomy tube exchange 08/2022  chronic stable left hydro   monitor urine output   trend BMP.     Problem/Plan - 5:  ·  Problem: Diarrhea.   now improved.  likely abx related   encouraged to increase PO intake      Problem/Plan - 6:  ·  Problem: Prophylactic measure.   ·  Plan: Lovenox for DVT ppx.     Problem/Plan - 7:  ·  Problem: Discharge planning issues.  ·  Plan: patient from home   will likely return home without needed   case management to follow up re safety as pt states dosn't have anyone to help her at home now     dispo plan when cleared by ID    --------------------------------------------  Case discussed with pt, NP, ID,   Education given on findings and plan of care  ___________________________  H. NIEVES Ochoa.   Pager: [phone]      Dr Sewell  will cover me Mon and Tue      .

## 2022-09-25 NOTE — PROGRESS NOTE ADULT - SUBJECTIVE AND OBJECTIVE BOX
Patient is seen and examined at the bed side, is afebrile.  She is  c/o constipation.       REVIEW OF SYSTEMS: All other review systems are negative      ALLERGIES: No Known Allergies      Vital Signs Last 24 Hrs  T(C): 36.6 (25 Sep 2022 14:06), Max: 36.9 (24 Sep 2022 20:49)  T(F): 97.9 (25 Sep 2022 14:06), Max: 98.5 (24 Sep 2022 20:49)  HR: 69 (25 Sep 2022 14:06) (63 - 74)  BP: 98/57 (25 Sep 2022 14:06) (98/57 - 112/68)  BP(mean): --  RR: 16 (25 Sep 2022 14:06) (16 - 17)  SpO2: 100% (25 Sep 2022 14:06) (100% - 100%)    Parameters below as of 25 Sep 2022 14:06  Patient On (Oxygen Delivery Method): room air      PHYSICAL EXAM:  GENERAL: Not in distress   CHEST/LUNG:  Not using accessory muscles   HEART: s1 and s2 present  ABDOMEN:  Nontender and  Nondistended  : Right Nephrostomy tube in placed , draining clear urine  EXTREMITIES: No pedal  edema  CNS: Awake and Alert      LABS: No new labs                         8.2    7.86  )-----------( 300      ( 24 Sep 2022 07:25 )             27.8                         8.7    7.95  )-----------( 330      ( 23 Sep 2022 05:45 )             29.2       09-24    137  |  101  |  18  ----------------------------<  97  4.4   |  27  |  0.95    Ca    11.0<H>      24 Sep 2022 07:25  Phos  2.9     -24  Mg     2.3     -24    TPro  7.2  /  Alb  2.5<L>  /  TBili  0.2  /  DBili  x   /  AST  15  /  ALT  13  /  AlkPhos  89  -24             09-23    138  |  105  |  21<H>  ----------------------------<  81  4.6   |  27  |  0.90    Ca    10.0      23 Sep 2022 05:45    TPro  8.4<H>  /  Alb  2.8<L>  /  TBili  0.1<L>  /  DBili  x   /  AST  19  /  ALT  11  /  AlkPhos  99        Urinalysis Basic - ( 20 Sep 2022 00:10 )  Color: Yellow / Appearance: Clear / S.015 / pH: x  Gluc: x / Ketone: Negative  / Bili: Negative / Urobili: Negative   Blood: x / Protein: 500 mg/dL / Nitrite: Negative   Leuk Esterase: Trace / RBC: 25-50 /HPF / WBC >50 /HPF   Sq Epi: x / Non Sq Epi: Few /HPF / Bacteria: Few /HPF        MEDICATIONS  (STANDING):    bisacodyl Suppository 10 milliGRAM(s) Rectal once  enoxaparin Injectable 40 milliGRAM(s) SubCutaneous every 24 hours  lactobacillus acidophilus 1 Tablet(s) Oral daily  linezolid  IVPB 600 milliGRAM(s) IV Intermittent every 12 hours  polyethylene glycol 3350 17 Gram(s) Oral two times a day  senna 2 Tablet(s) Oral at bedtime  sodium chloride 0.9%. 1000 milliLiter(s) (80 mL/Hr) IV Continuous <Continuous>    RADIOLOGY & ADDITIONAL TESTS:    22: CT Abdomen and Pelvis No Cont (22 @ 01:34) Chest CT: Interval progression of cavitary pulmonary metastatic disease   with increased size and number of pulmonary nodules. No evidence of  pneumonia.    CT abdomen/pelvis: Right nephrostomy tube in place without  hydronephrosis. Stable superior left hydronephrosis. Marked irregular   bladder wall thickening, unchanged. Progression of liver metastasis. Stable retroperitoneal adenopathy.    MICROBIOLOGY DATA:   Urine Microscopic-Add On (NC) (22 @ 03:44)   Comment - Urine: Many Ammorphous Urates   Epithelial Cells: Few /HPF   Red Blood Cell - Urine: >50 /HPF   White Blood Cell - Urine: >50 /HPF   Bacteria: Moderate /HPF     Culture - Urine (22 @ 00:10)   - Vancomycin: S 2   - Ciprofloxacin: S <=1   - Levofloxacin: S 2   - Nitrofurantoin: S <=32 Should not be used to treat pyelonephritis.   - Tetra/Doxy: R >8   - Ampicillin: S <=2 Predicts results to ampicillin/sulbactam, amoxacillin-clavulanate and piperacillin-tazobactam.   Specimen Source: Clean Catch Clean Catch (Midstream)   Culture Results:   >100,000 CFU/ml Enterococcus faecalis   Organism Identification: Enterococcus faecalis   Organism: Enterococcus faecalis   Method Type: PATRICIA     Culture - Blood (22 @ 05:50)   Specimen Source: .Blood Blood   Culture Results: No growth to date.     Culture - Blood (22 @ 05:35)   Specimen Source: .Blood Blood   Culture Results: No growth to date.     Culture - Urine (22 @ 00:10)   Specimen Source: Clean Catch Clean Catch (Midstream)   Culture Results:   >100,000 CFU/ml Enterococcus species     COVID-19 PCR . (22 @ 06:00)   COVID-19 PCR: NotDetec:"

## 2022-09-26 NOTE — PROGRESS NOTE ADULT - PROBLEM SELECTOR PLAN 8
24 hour discharge notice given  wants to stay today to monitor for adverse reaction.   Patient also c/o pain not being well managed. Started on Tramadol 50 mg Q 6 PRN.   SW to follow for safe discharge as patient endorses that she does not have anyone to care for her at home although she states that she lives with 2 family members.  Patient will need to follow up with her Oncologist and GYN Oncologist.

## 2022-09-26 NOTE — PROGRESS NOTE ADULT - PROBLEM SELECTOR PLAN 1
P/W Hematuria in nephrostomy drainage bag   hx of multi drug resistant UTIs and hx of   Urine Cx growing Enterococcus specie  Sensitivity discussed with Dr. Rodriguez  Patient refuses IV ABX with multiple frequencies   plan to start Augmentin 500mg Q8 today 9/26  requesting to be kept in hospital to be monitored   ID Jennifer following

## 2022-09-26 NOTE — PROGRESS NOTE ADULT - SUBJECTIVE AND OBJECTIVE BOX
HPI:  This is a 56 year old female from home, with PMH of Anemia, Asthma, Bladder cancer (previously on chemo until 5/2022) with  Dr. Renay Trinidad, R nephrostomy tube, hyperparathyroidism, bradycardia with PPM coming in for chest pain, hematuria and diarrhea. Pt states that chest pain started 2 days ago, was sharp in nature, 7/10, relieved with tylenol. She Also noticed that she had hematuria in her nephrostomy bag and a clot was passed into it. She was recently at [facility] for a UTI and was given 2g of ampicillin, after which she developed diarrhea. Additionally she has left flank pain now.     In ED v/s: /70, HR 66, RR 16, T 98.3    CT A/P: Right nephrostomy tube in place without hydronephrosis. Stable superior left hydronephrosis. Marked irregular bladder wall thickening, unchanged. Progression of liver metastasis.  (20 Sep 2022 04:50)      Patient is a 56y old  Female who presents with a chief complaint of Chest Pain, UTI (26 Sep 2022 14:03)      INTERVAL HPI/OVERNIGHT EVENTS:  T(C): 36.9 (09-26-22 @ 13:51), Max: 37.3 (09-26-22 @ 08:50)  HR: 82 (09-26-22 @ 13:51) (67 - 82)  BP: 92/78 (09-26-22 @ 13:51) (92/78 - 114/59)  RR: 17 (09-26-22 @ 13:51) (16 - 17)  SpO2: 97% (09-26-22 @ 13:51) (97% - 100%)  Wt(kg): --  I&O's Summary    25 Sep 2022 07:01  -  26 Sep 2022 07:00  --------------------------------------------------------  IN: 0 mL / OUT: 600 mL / NET: -600 mL        REVIEW OF SYSTEMS: denies fever, chills, SOB, palpitations, chest pain, abdominal pain, nausea, vomitting, diarrhea, constipation, dizziness    MEDICATIONS  (STANDING):  amoxicillin 500 milliGRAM(s) Oral every 8 hours  enoxaparin Injectable 40 milliGRAM(s) SubCutaneous every 24 hours  lactobacillus acidophilus 1 Tablet(s) Oral daily  polyethylene glycol 3350 17 Gram(s) Oral two times a day  senna 2 Tablet(s) Oral at bedtime  sodium chloride 0.9%. 1000 milliLiter(s) (80 mL/Hr) IV Continuous <Continuous>    MEDICATIONS  (PRN):  acetaminophen     Tablet .. 650 milliGRAM(s) Oral every 6 hours PRN Mild Pain (1 - 3)  traMADol 50 milliGRAM(s) Oral every 6 hours PRN Moderate Pain (4 - 6)      PHYSICAL EXAM:  GENERAL: NAD, well-groomed, well-developed  HEAD:  Atraumatic, Normocephalic  EYES: EOMI, PERRLA, conjunctiva and sclera clear  ENMT: No tonsillar erythema, exudates, or enlargement; Moist mucous membranes, Good dentition, No lesions  NECK: Supple, No JVD, Normal thyroid  NERVOUS SYSTEM:  Alert & Oriented X3, Good concentration; Motor Strength 5/5 B/L upper and lower extremities; DTRs 2+ intact and symmetric  CHEST/LUNG: Clear to percussion bilaterally; No rales, rhonchi, wheezing, or rubs  HEART: Regular rate and rhythm; No murmurs, rubs, or gallops  ABDOMEN: Soft, Nontender, Nondistended; Bowel sounds present  EXTREMITIES:  2+ Peripheral Pulses, No clubbing, cyanosis, or edema  LYMPH: No lymphadenopathy noted  SKIN: No rashes or lesions  LABS:                        8.5    6.93  )-----------( 277      ( 26 Sep 2022 09:58 )             28.2     09-26    137  |  101  |  15  ----------------------------<  177<H>  3.8   |  26  |  1.06    Ca    9.6      26 Sep 2022 09:58          CAPILLARY BLOOD GLUCOSE      POCT Blood Glucose.: 73 mg/dL (26 Sep 2022 08:37)

## 2022-09-26 NOTE — PROGRESS NOTE ADULT - PROBLEM SELECTOR PLAN 6
Intermittent vaginal bleeding endorsed but none at this time.   Seen by GYN on 08/22  and at that time Pt deferred pelvic exam and pelvic sonogram as she was worked up on previous admission and recommended to follow up outpatient at [facility]. No acute interventions at that time.   Pt endorses that she wants to address with her oncologist for pelvic exam and for transvaginal US. Encouraged her to see a GYN oncologist and referral given.   H&H stable.   Monitor CBC daily.

## 2022-09-26 NOTE — PROGRESS NOTE ADULT - ASSESSMENT
56 year old female from home, with PMHX of Bladder cancer (previously on chemo until 5/2022) with  Dr. Renay Trinidad, chronic hydronephrosis  R nephrostomy tube, hyperparathyroidism, bradycardia with PPM coming in for hematuria. Of note patient with hx multidrug resistant UTI, recent nephrostomy tube exchange 8/22/22. Started on Cefepime based on previous UCX- ID Jennifer consulted. Pending urine culture   CT A/P: Right nephrostomy tube in place without hydronephrosis. Stable superior left hydronephrosis. Marked irregular bladder wall thickening, unchanged. Progression of liver metastasis. Hospital course complicated by chest pain, EKG with possible inferior ischemia- Dr lCark consulted.  Patient seen this AM, complains 0f lower back pain, otherwise in NAD. Afebrile, no leukocytosis. Continue with Cefepime. Urine culture growing Enterococcus specie. Pending sensitivity result.  09/23: Cefepime discontinued and Linezolid 600 mg BID IV started. ID reccs appreciated. Plan to d/c home tomorrow on Linezolid  mg BID for a total of 7 days course. D/c plan pending MIRIAN betancourt for safe discharge as patient endorses she has no one to care for her in her home although patient reports she lives with 2 family members. Patient also does not feel that she should be discharged today as she does not know if she will develop a reaction to Linezolid and wants to be monitored. Discussed plan of care with ID and attending.

## 2022-09-26 NOTE — PROGRESS NOTE ADULT - SUBJECTIVE AND OBJECTIVE BOX
NP Note discussed with  primary attending    Patient is a 56y old  Female who presents with a chief complaint of Chest Pain, UTI (25 Sep 2022 14:23)      INTERVAL HPI/OVERNIGHT EVENTS: constipation, kidney pain refusing IV ABX. 24 hour discharge notice given    MEDICATIONS  (STANDING):  amoxicillin 500 milliGRAM(s) Oral every 8 hours  enoxaparin Injectable 40 milliGRAM(s) SubCutaneous every 24 hours  lactobacillus acidophilus 1 Tablet(s) Oral daily  polyethylene glycol 3350 17 Gram(s) Oral two times a day  senna 2 Tablet(s) Oral at bedtime  sodium chloride 0.9%. 1000 milliLiter(s) (80 mL/Hr) IV Continuous <Continuous>    MEDICATIONS  (PRN):  acetaminophen     Tablet .. 650 milliGRAM(s) Oral every 6 hours PRN Mild Pain (1 - 3)  traMADol 50 milliGRAM(s) Oral every 6 hours PRN Moderate Pain (4 - 6)      __________________________________________________  REVIEW OF SYSTEMS:    CONSTITUTIONAL: No fever,   EYES: no acute visual disturbances  NECK: No pain or stiffness  RESPIRATORY: No cough; No shortness of breath  CARDIOVASCULAR: No chest pain, no palpitations  GASTROINTESTINAL: No pain. No nausea or vomiting; No diarrhea + constipation  NEUROLOGICAL: No headache or numbness, no tremors  MUSCULOSKELETAL: No joint pain, no muscle pain  GENITOURINARY: no dysuria, no frequency, no hesitancy + "kidney pain"   PSYCHIATRY: no depression , no anxiety  ALL OTHER  ROS negative        Vital Signs Last 24 Hrs  T(C): 36.9 (26 Sep 2022 13:51), Max: 37.3 (26 Sep 2022 08:50)  T(F): 98.4 (26 Sep 2022 13:51), Max: 99.2 (26 Sep 2022 08:50)  HR: 82 (26 Sep 2022 13:51) (67 - 82)  BP: 92/78 (26 Sep 2022 13:51) (92/78 - 114/59)  BP(mean): 82 (26 Sep 2022 13:51) (82 - 82)  RR: 17 (26 Sep 2022 13:51) (16 - 17)  SpO2: 97% (26 Sep 2022 13:51) (97% - 100%)    Parameters below as of 26 Sep 2022 13:51  Patient On (Oxygen Delivery Method): room air        ________________________________________________  PHYSICAL EXAM:  GENERAL: NAD, cachectic.    HEENT: Normocephalic;  conjunctivae and sclerae clear; moist mucous membranes;   NECK : supple  CHEST/LUNG: Clear to auscultation bilaterally with good air entry. On RA.   HEART: S1 S2  regular; no murmurs, gallops or rubs  ABDOMEN:  Soft, diffused tenderness to light palpation. Nondistended; Bowel sounds hypoactive.   : +  right Nephrostomy tube  EXTREMITIES: no cyanosis; no edema; no calf tenderness  SKIN: warm and dry; no rash  NERVOUS SYSTEM:  Awake and alert; Oriented  to place, person and time + tearful, anxious  _________________________________________________  LABS:                        8.5    6.93  )-----------( 277      ( 26 Sep 2022 09:58 )             28.2     09-26    137  |  101  |  15  ----------------------------<  177<H>  3.8   |  26  |  1.06    Ca    9.6      26 Sep 2022 09:58          CAPILLARY BLOOD GLUCOSE      POCT Blood Glucose.: 73 mg/dL (26 Sep 2022 08:37)        RADIOLOGY & ADDITIONAL TESTS:     < from: CT Abdomen and Pelvis No Cont (09.20.22 @ 01:34) >      IMPRESSION:  Chest CT: Interval progression of cavitary pulmonary metastatic disease   with increased size and number of pulmonary nodules. No evidence of   pneumonia.    CT abdomen/pelvis: Right nephrostomy tube in place without   hydronephrosis. Stable superior left hydronephrosis. Marked irregular   bladder wall thickening, unchanged.  Progression of liver metastasis. Stable retroperitoneal adenopathy.    --- End of Report ---      < end of copied text >  Imaging Personally Reviewed:  YES    Consultant(s) Notes Reviewed:   YES    Care Discussed with Consultants: ID      Plan of care was discussed with patient and /or primary care giver; all questions and concerns were addressed and care was aligned with patient's wishes.

## 2022-09-26 NOTE — PROGRESS NOTE ADULT - ASSESSMENT
meka nd examined  covering for Dr Ochoa   on bed  mildly depressed but no thoughts of suicide or harm to others  c/o mild  abd pain  no nausea or vomiting  had rectal suppos  had 1 bm today   mild lower abd pain  no dysurea   vs stable physical done  lungs clear abd soft bs nml nt nd   cns awake nf  labs noted  hgb 8.5   creat 1.06  a/p recurrent uti possibly sec to nephrostomy tube second bladder ca   came with UTI  now better  was on zosyn  now on amoxicilln as per id   pt wnts lower dose of amoxicilline 250 q8  i d/w ID she is ok   extra water   avoid constipation   D/C planning

## 2022-09-27 NOTE — PROGRESS NOTE ADULT - PROBLEM SELECTOR PLAN 6
Intermittent vaginal bleeding endorsed but none at this time.   Seen by GYN on 08/22  and at that time Pt deferred pelvic exam and pelvic sonogram as she was worked up on previous admission and recommended to follow up outpatient at [facility]. No acute interventions at that time.   Pt endorses that she wants to address with her oncologist for pelvic exam and for transvaginal US. Encouraged her to see a GYN oncologist and referral given.   H&H stable.   Monitor CBC daily.

## 2022-09-27 NOTE — PROGRESS NOTE ADULT - PROBLEM SELECTOR PLAN 8
24 hour discharge notice given  wants to stay today to monitor for adverse reaction.   Patient also c/o pain not being well managed. Started on Tramadol 50 mg Q 6 PRN.   SW to follow for safe discharge as patient endorses that she does not have anyone to care for her at home although she states that she lives with 2 family members.  Patient will need to follow up with her Oncologist and GYN Oncologist.    Pt refusing discharge, 24hr notice was given yesterday, pt is appealing

## 2022-09-27 NOTE — PROGRESS NOTE ADULT - ASSESSMENT
seen and examiend vsstable afebrile physical done ok  denies cp or sob or palp  bm ok  pt dx COVID pos yesterday  has mild cough  po 98 on RA     advised oob in chair  lungs exercise explained   uti on amoxicillin

## 2022-09-27 NOTE — PROGRESS NOTE ADULT - SUBJECTIVE AND OBJECTIVE BOX
Patient is a 56y old  Female who presents with a chief complaint of Chest Pain, UTI (27 Sep 2022 14:55)    INTERVAL HPI/OVERNIGHT EVENTS: no acute events overnight , pt is very anxious, loud, and yelling in room. Pt demanding private room. NP went to room to address if there was any  concern regarding discharge and provided support.  Pt is refusing discharge and demanding private room. pt started yelling at NP too.  escalated to unit manager Stefanie.         REVIEW OF SYSTEMS:  CONSTITUTIONAL: No fever, chills  ENMT:  No difficulty hearing, no change in vision  NECK: No pain or stiffness  RESPIRATORY: No cough, SOB  CARDIOVASCULAR: No chest pain, palpitations  GASTROINTESTINAL: No abdominal pain. No nausea, vomiting, or diarrhea  GENITOURINARY: No dysuria  NEUROLOGICAL: No HA  SKIN: No itching, burning, rashes, or lesions   LYMPH NODES: No enlarged glands  ENDOCRINE: No heat or cold intolerance; No hair loss  MUSCULOSKELETAL: No joint pain or swelling; No muscle, back, or extremity pain  PSYCHIATRIC: No depression, anxiety  HEME/LYMPH: No easy bruising, or bleeding gums    T(C): 36.7 (09-27-22 @ 14:31), Max: 37.7 (09-27-22 @ 05:14)  HR: 85 (09-27-22 @ 14:31) (81 - 87)  BP: 103/54 (09-27-22 @ 14:31) (101/62 - 115/63)  RR: 17 (09-27-22 @ 14:31) (17 - 18)  SpO2: 97% (09-27-22 @ 14:31) (97% - 100%)  Wt(kg): --Vital Signs Last 24 Hrs  T(C): 36.7 (27 Sep 2022 14:31), Max: 37.7 (27 Sep 2022 05:14)  T(F): 98 (27 Sep 2022 14:31), Max: 99.9 (27 Sep 2022 05:14)  HR: 85 (27 Sep 2022 14:31) (81 - 87)  BP: 103/54 (27 Sep 2022 14:31) (101/62 - 115/63)  BP(mean): 72 (27 Sep 2022 05:14) (72 - 72)  RR: 17 (27 Sep 2022 14:31) (17 - 18)  SpO2: 97% (27 Sep 2022 14:31) (97% - 100%)    Parameters below as of 27 Sep 2022 14:31  Patient On (Oxygen Delivery Method): room air        PHYSICAL EXAM:  GENERAL: NAD, anxious and lound  EYES: clear conjunctiva; EOMI  ENMT: Moist mucous membranes  NECK: Supple, No JVD, Normal thyroid  CHEST/LUNG: Clear to auscultation bilaterally; No rales, rhonchi, wheezing, or rubs  HEART: S1, S2, Regular rate and rhythm  ABDOMEN: Soft, Nontender, Nondistended; Bowel sounds present  NEURO: Alert & Oriented X3  EXTREMITIES: No LE edema, no calf tenderness  LYMPH: No lymphadenopathy noted  SKIN: No rashes or lesions  : R neph  tube site dressing CDi    Consultant(s) Notes Reviewed:  [x ] YES  [ ] NO  Care Discussed with Consultants/Other Providers [ x] YES  [ ] NO    LABS:                        8.9    8.41  )-----------( 291      ( 27 Sep 2022 06:00 )             29.6     09-27    136  |  101  |  16  ----------------------------<  92  4.6   |  28  |  1.13    Ca    10.2      27 Sep 2022 06:00    CAPILLARY BLOOD GLUCOSE      RADIOLOGY & ADDITIONAL TESTS:    Imaging Personally Reviewed:  [x ] YES  [ ] NO  < from: CT Abdomen and Pelvis No Cont (09.20.22 @ 01:34) >    ACC: [id number] EXAM:  CT ABDOMEN AND PELVIS                        ACC: [id number] EXAM:  CT CHEST                          PROCEDURE DATE:  09/20/2022          INTERPRETATION:  CLINICAL INFORMATION: Chest pain. Right nephrostomy   tube, hematuria.    COMPARISON: CT chest, abdomen and pelvis 7/31/2022, abdominal sonogram   8/18/2022    CONTRAST/COMPLICATIONS:  IV Contrast: None  Oral Contrast: None  Complications: None    PROCEDURE:  CT of the Chest, Abdomen and Pelvis was performed without intravenous   contrast.  Intravenous contrast: None.  Oral contrast: positive contrast was administered.  Sagittal and coronal reformats were performed.    FINDINGS:    CHEST:    LUNGS AND LARGE AIRWAYS: Patent central airways. Bilateral pulmonary   noduleshave again increased in size and number since the recent prior   chest CT. For example, left lower lobe nodule now measures 2.8 x 2.0 cm   and has cavitation (prior 2.5 x 1.9 cm). A right lower lobe nodule now   measures 2.5 x 2.0 cm (prior 2.0 x 1.8cm) with interval resolution of   cavitation within this nodule. Adjacent nodule now appears cavitary.  PLEURA: No pleural effusion. No pneumothorax.  VESSELS: Within normal limits.  HEART: Heart size is normal. Trace pericardial effusion. Distal leads of   cardiac device overlying the right atrium and ventricle, unchanged.  MEDIASTINUM AND FLORENTIN: No lymphadenopathy.  CHEST WALL AND LOWER NECK: Left chest wall dual-lead cardiac device.    ABDOMEN AND PELVIS:    LIVER: 2.4 cm hypodense lesion in the right hepatic lobe not   significantly changed from the prior exam. There are several new   hypodense lesions within the left and right hepatic lobes as for example   the left hepatic lobe on series 4 image 114 measures 2 cm and in the   right hepatic lobe 0.9 cm on series 4 image 134.  BILE DUCTS: Normal caliber.  GALLBLADDER: Within normal limits.  SPLEEN: Within normal limits.  PANCREAS: Within normal limits.  ADRENALS: Within normal limits.  KIDNEYS/URETERS: Right nephrostomy tube in place. No hydronephrosis or   perinephric stranding on the right. Severe left hydronephrosis and   scattered calcifications, unchanged. Minimal left perinephric stranding   similar to the prior exam.    BLADDER: Marked bladder wall thickening similar to the prior exam.  REPRODUCTIVE ORGANS: Uterus and adnexal structures similar to prior exam.    BOWEL: No bowel obstruction. Appendix is normal. No bowel wall thickening   or pericolonic inflammatory change. Underdistended stomach which limits   evaluation of the wall thickness.  PERITONEUM: No ascites. No free air.  VESSELS:  Within normal limits.  RETROPERITONEUM: Retroperitoneal adenopathy is not significantly changed.   Left periaortic lymph node measures 2.5 cm  ABDOMINAL WALL: Within normal limits.  BONES: No aggressive osteoblastic or lytic lesion.    IMPRESSION:  Chest CT: Interval progression of cavitary pulmonary metastatic disease   with increased size and number of pulmonary nodules. No evidence of   pneumonia.    CT abdomen/pelvis: Right nephrostomy tube in place without   hydronephrosis. Stable superior left hydronephrosis. Marked irregular   bladder wall thickening, unchanged.  Progression of liver metastasis. Stable retroperitoneal adenopathy.    --- End of Report ---            SARAI DUGGAN MD; Attending Radiologist  This document has been electronically signed. Sep 20 2022  3:17AM    < end of copied text >  < from: CT Chest No Cont (09.20.22 @ 01:33) >  CC: [id number] EXAM:  CT ABDOMEN AND PELVIS                        ACC: [id number] EXAM:  CT CHEST                          PROCEDURE DATE:  09/20/2022          INTERPRETATION:  CLINICAL INFORMATION: Chest pain. Right nephrostomy   tube, hematuria.    COMPARISON: CT chest, abdomen and pelvis 7/31/2022, abdominal sonogram   8/18/2022    CONTRAST/COMPLICATIONS:  IV Contrast: None  Oral Contrast: None  Complications: None    PROCEDURE:  CT of the Chest, Abdomen and Pelvis was performed without intravenous   contrast.  Intravenous contrast: None.  Oral contrast: positive contrast was administered.  Sagittal and coronal reformats were performed.    FINDINGS:    CHEST:    LUNGS AND LARGE AIRWAYS: Patent central airways. Bilateral pulmonary   noduleshave again increased in size and number since the recent prior   chest CT. For example, left lower lobe nodule now measures 2.8 x 2.0 cm   and has cavitation (prior 2.5 x 1.9 cm). A right lower lobe nodule now   measures 2.5 x 2.0 cm (prior 2.0 x 1.8cm) with interval resolution of   cavitation within this nodule. Adjacent nodule now appears cavitary.  PLEURA: No pleural effusion. No pneumothorax.  VESSELS: Within normal limits.  HEART: Heart size is normal. Trace pericardial effusion. Distal leads of   cardiac device overlying the right atrium and ventricle, unchanged.  MEDIASTINUM AND FLORENTIN: No lymphadenopathy.  CHEST WALL AND LOWER NECK: Left chest wall dual-lead cardiac device.    ABDOMEN AND PELVIS:    LIVER: 2.4 cm hypodense lesion in the right hepatic lobe not   significantly changed from the prior exam. There are several new   hypodense lesions within the left and right hepatic lobes as for example   the left hepatic lobe on series 4 image 114 measures 2 cm and in the   right hepatic lobe 0.9 cm on series 4 image 134.  BILE DUCTS: Normal caliber.  GALLBLADDER: Within normal limits.  SPLEEN: Within normal limits.  PANCREAS: Within normal limits.  ADRENALS: Within normal limits.  KIDNEYS/URETERS: Right nephrostomy tube in place. No hydronephrosis or   perinephric stranding on the right. Severe left hydronephrosis and   scattered calcifications, unchanged. Minimal left perinephric stranding   similar to the prior exam.    BLADDER: Marked bladder wall thickening similar to the prior exam.  REPRODUCTIVE ORGANS: Uterus and adnexal structures similar to prior exam.    BOWEL: No bowel obstruction. Appendix is normal. No bowel wall thickening   or pericolonic inflammatory change. Underdistended stomach which limits   evaluation of the wall thickness.  PERITONEUM: No ascites. No free air.  VESSELS:  Within normal limits.  RETROPERITONEUM: Retroperitoneal adenopathy is not significantly changed.   Left periaortic lymph node measures 2.5 cm  ABDOMINAL WALL: Within normal limits.  BONES: No aggressive osteoblastic or lytic lesion.    IMPRESSION:  Chest CT: Interval progression of cavitary pulmonary metastatic disease   with increased size and number of pulmonary nodules. No evidence of   pneumonia.    CT abdomen/pelvis: Right nephrostomy tube in place without   hydronephrosis. Stable superior left hydronephrosis. Marked irregular   bladder wall thickening, unchanged.  Progression of liver metastasis. Stable retroperitoneal adenopathy.    --- End of Report ---            SARAI DUGGAN MD; Attending Radiologist  This document has been electronically signed. Sep 20 2022  3:17AM    < end of copied text >

## 2022-09-27 NOTE — PROGRESS NOTE ADULT - SUBJECTIVE AND OBJECTIVE BOX
HPI:  This is a 56 year old female from home, with PMH of Anemia, Asthma, Bladder cancer (previously on chemo until 5/2022) with  Dr. Renay Trinidad, R nephrostomy tube, hyperparathyroidism, bradycardia with PPM coming in for chest pain, hematuria and diarrhea. Pt states that chest pain started 2 days ago, was sharp in nature, 7/10, relieved with tylenol. She Also noticed that she had hematuria in her nephrostomy bag and a clot was passed into it. She was recently at [facility] for a UTI and was given 2g of ampicillin, after which she developed diarrhea. Additionally she has left flank pain now.     In ED v/s: /70, HR 66, RR 16, T 98.3    CT A/P: Right nephrostomy tube in place without hydronephrosis. Stable superior left hydronephrosis. Marked irregular bladder wall thickening, unchanged. Progression of liver metastasis.  (20 Sep 2022 04:50)      Patient is a 56y old  Female who presents with a chief complaint of Chest Pain, UTI (26 Sep 2022 14:45)      INTERVAL HPI/OVERNIGHT EVENTS:  T(C): 36.7 (09-27-22 @ 14:31), Max: 37.7 (09-27-22 @ 05:14)  HR: 85 (09-27-22 @ 14:31) (81 - 92)  BP: 103/54 (09-27-22 @ 14:31) (101/62 - 115/63)  RR: 17 (09-27-22 @ 14:31) (17 - 18)  SpO2: 97% (09-27-22 @ 14:31) (97% - 100%)  Wt(kg): --  I&O's Summary    26 Sep 2022 07:01  -  27 Sep 2022 07:00  --------------------------------------------------------  IN: 0 mL / OUT: 200 mL / NET: -200 mL        REVIEW OF SYSTEMS: denies fever, chills, SOB, palpitations, chest pain, abdominal pain, nausea, vomitting, diarrhea, constipation, dizziness    MEDICATIONS  (STANDING):  amoxicillin 250 milliGRAM(s) Oral every 8 hours  enoxaparin Injectable 40 milliGRAM(s) SubCutaneous every 24 hours  lactobacillus acidophilus 1 Tablet(s) Oral daily  polyethylene glycol 3350 17 Gram(s) Oral two times a day  senna 2 Tablet(s) Oral at bedtime  sodium chloride 0.9%. 1000 milliLiter(s) (80 mL/Hr) IV Continuous <Continuous>    MEDICATIONS  (PRN):  acetaminophen     Tablet .. 650 milliGRAM(s) Oral every 6 hours PRN Mild Pain (1 - 3)  traMADol 50 milliGRAM(s) Oral every 6 hours PRN Moderate Pain (4 - 6)      PHYSICAL EXAM:  GENERAL: NAD, well-groomed, well-developed  HEAD:  Atraumatic, Normocephalic  EYES: EOMI, PERRLA, conjunctiva and sclera clear  ENMT: No tonsillar erythema, exudates, or enlargement; Moist mucous membranes, Good dentition, No lesions  NECK: Supple, No JVD, Normal thyroid  NERVOUS SYSTEM:  Alert & Oriented X3, Good concentration; Motor Strength 5/5 B/L upper and lower extremities; DTRs 2+ intact and symmetric  CHEST/LUNG: Clear to percussion bilaterally; No rales, rhonchi, wheezing, or rubs  HEART: Regular rate and rhythm; No murmurs, rubs, or gallops  ABDOMEN: Soft, Nontender, Nondistended; Bowel sounds present  EXTREMITIES:  2+ Peripheral Pulses, No clubbing, cyanosis, or edema  LYMPH: No lymphadenopathy noted  SKIN: No rashes or lesions  LABS:                        8.9    8.41  )-----------( 291      ( 27 Sep 2022 06:00 )             29.6     09-27    136  |  101  |  16  ----------------------------<  92  4.6   |  28  |  1.13    Ca    10.2      27 Sep 2022 06:00          CAPILLARY BLOOD GLUCOSE

## 2022-09-28 NOTE — PROGRESS NOTE ADULT - ASSESSMENT
_________________________________________________________________________________________  ========>>  M E D I C A L   A T T E N D I N G    F O L L O W  U P  N O T E  <<=========  -----------------------------------------------------------------------------------------------------    - Patient seen and examined by me earlier today.   - In summary,  JEF HOUSE is a 56y year old woman admitted with UTI  - Patient today overall doing ok, comfortable, eating fairly , pain better controlled      pt tested positive for COVID !! + occasional cough with some phlegm otherwise doing ok     ==================>> REVIEW OF SYSTEM <<=================    GEN: no fever, no chills, as above , weak  RESP: no SOB, as above , off O2   CVS: chest pain as above ( related to left flank pain) , no palpitations  GI: no abdominal pain, no nausea, no more diarrhea reported   : no dysuria, no frequency.. no more blood tinged urin in bag   Neuro: no headache, no dizziness  Derm : no itching, no rash    ==================>> PHYSICAL EXAM <<=================    GEN: A&O X 3 , NAD , somewhat uncomfortable, pleasant, calm , cachectic , weak appearing   HEENT: NCAT, PERRL, MMM, hearing intact  Neck: supple , no JVD appreciated  CVS: S1S2 , regular , No M/R/G appreciated  PULM: CTA B/L,  no W/R/R appreciated  ABD.: soft. non tender, non distended,  bowel sounds present  Extrem: intact pulses , no edema      nephrostomy bag / leg bag draining clear yellow urine   PSYCH : normal mood,  not anxious                             ( Note written / Date of service 09-28-22 )    ==================>> MEDICATIONS <<====================    amoxicillin 250 milliGRAM(s) Oral every 8 hours  enoxaparin Injectable 40 milliGRAM(s) SubCutaneous every 24 hours  lactobacillus acidophilus 1 Tablet(s) Oral daily  polyethylene glycol 3350 17 Gram(s) Oral two times a day  senna 2 Tablet(s) Oral at bedtime  sodium chloride 0.9%. 1000 milliLiter(s) IV Continuous <Continuous>    MEDICATIONS  (PRN):  acetaminophen     Tablet .. 650 milliGRAM(s) Oral every 6 hours PRN Mild Pain (1 - 3)  traMADol 50 milliGRAM(s) Oral every 6 hours PRN Moderate Pain (4 - 6)    ___________  Active diet:  Diet, Regular  ___________________    ==================>> VITAL SIGNS <<==================    Vital Signs Last 24 HrsT(C): 36.7 (09-28-22 @ 12:49)  T(F): 98.1 (09-28-22 @ 12:49), Max: 98.7 (09-27-22 @ 21:02)  HR: 86 (09-28-22 @ 12:49) (80 - 86)  BP: 125/83 (09-28-22 @ 12:49)  RR: 18 (09-28-22 @ 12:49) (17 - 18)  SpO2: 100% (09-28-22 @ 12:49) (97% - 100%)       ==================>> LAB AND IMAGING <<==================                        8.9    8.41  )-----------( 291      ( 27 Sep 2022 06:00 )             29.6        09-27    136  |  101  |  16  ----------------------------<  92  4.6   |  28  |  1.13    Ca    10.2      27 Sep 2022 06:00      WBC count:   8.41 <<== ,  6.93 <<== ,  7.86 <<==   Hemoglobin:   8.9 <<==,  8.5 <<==,  8.2 <<==  platelets:  291 <==, 277 <==, 300 <==, 330 <==    Creatinine:  1.13  <<==, 1.06  <<==, 0.95  <<==, 0.90  <<==  Sodium:   136  <==, 137  <==, 137  <==, 138  <==    ____________________________    M I C R O B I O L O G Y :    Culture - Blood (collected 20 Sep 2022 05:50)  Source: .Blood Blood  Final Report (25 Sep 2022 13:00):    No Growth Final    Culture - Blood (collected 20 Sep 2022 05:35)  Source: .Blood Blood  Final Report (25 Sep 2022 13:00):    No Growth Final    Culture - Urine (collected 20 Sep 2022 00:10)  Source: Clean Catch Clean Catch (Midstream)  Final Report (22 Sep 2022 20:23):    >100,000 CFU/ml Enterococcus faecalis  Organism: Enterococcus faecalis (22 Sep 2022 20:23)  Organism: Enterococcus faecalis (22 Sep 2022 20:23)    Sensitivities:      -  Ampicillin: S <=2 Predicts results to ampicillin/sulbactam, amoxacillin-clavulanate and  piperacillin-tazobactam.      -  Ciprofloxacin: S <=1      -  Levofloxacin: S 2      -  Nitrofurantoin: S <=32 Should not be used to treat pyelonephritis.      -  Tetra/Doxy: R >8      -  Vancomycin: S 2      Method Type: PATRICIA      COVID-19 PCR: Detected (09-26-22 @ 16:05)  COVID-19 PCR: NotDetec (09-22-22 @ 13:40)  COVID-19 PCR: NotDetec (09-20-22 @ 06:00)    _________________________________________________________________________________  ===============>>  A S S E S S M E N T   A N D   P L A N <<===============  ------------------------------------------------------------------------------------------    · Assessment	  56 year old female from home, with PMHX of Bladder cancer (previously on chemo until 5/2022) with  Dr. Renay Trinidad, chronic hydronephrosis  R nephrostomy tube, hyperparathyroidism, bradycardia with PPM coming in for hematuria. Of note patient with hx multidrug resistant UTI, recent nephrostomy tube exchange 8/22/22. Started on Cefepime based on previous UCX- ID Jennifer consulted. Pending urine culture   CT A/P: Right nephrostomy tube in place without hydronephrosis. Stable superior left hydronephrosis. Marked irregular bladder wall thickening, unchanged. Progression of liver metastasis. Hospital course complicated by chest pain, EKG with possible inferior ischemia- Dr Clark consulted     Problem/Plan - 1:  ·  Problem: Complicated UTI (urinary tract infection).   ·  Plan: P/W Hematuria in nephrostomy drainage bag   hx of multi drug resistant UTIs   on Oral Abx per ID   ID Rodriguez following  pain mgmt as above     *** + covid - 19 swab    pt with minimal symptoms, off O2       monitor        supportive care       nutrition / hydration, IS, vitamins     Problem/Plan - 2:  ·  Problem: Metastatic cancer.   ·  Plan: metastatic bladder CA   CT confirms progression of disease with mets to lung and liver   last chemo 05/2022  plan to continue treatment once discharged.  f/u onc as OP     Problem/Plan - 3:  ·  Problem: Atypical chest pain.  ·  Plan: c/p flank pain that radiates to chest   monitor      Problem/Plan - 4:  ·  Problem: Hydronephrosis.  ·  Plan: s/p R nephrostomy tube exchange 08/2022  chronic stable left hydro   monitor urine output   trend BMP.     Problem/Plan - 5:  ·  Problem: Diarrhea.   improved.  encouraged to increase PO intake      Problem/Plan - 6:  ·  Problem: Prophylactic measure.   ·  Plan: Lovenox for DVT ppx.     Problem/Plan - 7:  ·  Problem: Discharge planning issues.  ·  Plan: patient from home   will likely return home without needed   case management to follow up re safety as pt states dosn't have anyone to help her at home now     dispo plan when cleared by ID    --------------------------------------------  Case discussed with pt,   Education given on findings and plan of care  ___________________________  H. NIEVES Ochoa.   Pager: [phone]      .

## 2022-09-29 NOTE — PROGRESS NOTE ADULT - PROBLEM SELECTOR PLAN 1
P/W Hematuria in nephrostomy drainage bag   hx of multi drug resistant UTIs and hx of   Urine Cx growing Enterococcus specie  Sensitivity discussed with Dr. Rodriguez  Patient refuses IV ABX with multiple frequencies   plan to start Augmentin 500mg Q8 today 9/26  requesting to be kept in hospital to be monitored   ID Jennifer following P/W Hematuria in nephrostomy drainage bag   hx of multi drug resistant UTIs and hx of   Urine Cx growing Enterococcus specie  cont  Augmentin 500mg Q8 today 9/30  Id following

## 2022-09-29 NOTE — PROGRESS NOTE ADULT - PROBLEM SELECTOR PLAN 8
24 hour discharge notice given  wants to stay today to monitor for adverse reaction.   Patient also c/o pain not being well managed. Started on Tramadol 50 mg Q 6 PRN.   SW to follow for safe discharge as patient endorses that she does not have anyone to care for her at home although she states that she lives with 2 family members.  Patient will need to follow up with her Oncologist and GYN Oncologist.    Pt refusing discharge, 24hr notice was given yesterday, pt is appealing 24 hour discharge notice given  wants to stay today to monitor for adverse reaction.   Patient also c/o pain not being well managed. Started on Tramadol 50 mg Q 6 PRN.   SW to follow for safe discharge as patient endorses that she does not have anyone to care for her at home although she states that she lives with 2 family members.  Patient will need to follow up with her Oncologist and GYN Oncologist.    Pt refusing discharge, 24hr notice was given, pt appeared and was rejected. pt is appealing again

## 2022-09-29 NOTE — PROGRESS NOTE ADULT - PROBLEM SELECTOR PLAN 6
Intermittent vaginal bleeding endorsed but none at this time.   Seen by GYN on 08/22  and at that time Pt deferred pelvic exam and pelvic sonogram as she was worked up on previous admission and recommended to follow up outpatient at [facility]. No acute interventions at that time.   Pt endorses that she wants to address with her oncologist for pelvic exam and for transvaginal US. Encouraged her to see a GYN oncologist and referral given.   H&H stable.   Monitor CBC daily.

## 2022-09-29 NOTE — PROGRESS NOTE ADULT - SUBJECTIVE AND OBJECTIVE BOX
Patient is a 56y old  Female who presents with a chief complaint of Chest Pain, UTI (29 Sep 2022 14:27)      INTERVAL HPI/OVERNIGHT EVENTS: c/o vag bleed overnight Hg stable, bleeding resolved.      REVIEW OF SYSTEMS:  CONSTITUTIONAL: No fever, chills  ENMT:  No difficulty hearing, no change in vision  NECK: No pain or stiffness  RESPIRATORY: No cough, SOB  CARDIOVASCULAR: No chest pain, palpitations  GASTROINTESTINAL: No abdominal pain. No nausea, vomiting, or diarrhea  GENITOURINARY: No dysuria  NEUROLOGICAL: No HA  SKIN: No itching, burning, rashes, or lesions   LYMPH NODES: No enlarged glands  ENDOCRINE: No heat or cold intolerance; No hair loss  MUSCULOSKELETAL: No joint pain or swelling; No muscle, back, or extremity pain  PSYCHIATRIC: No depression, anxiety  HEME/LYMPH: No easy bruising, or bleeding gums    T(C): 37.2 (09-29-22 @ 14:45), Max: 37.2 (09-29-22 @ 14:45)  HR: 82 (09-29-22 @ 14:45) (78 - 84)  BP: 112/61 (09-29-22 @ 14:45) (94/64 - 122/73)  RR: 18 (09-29-22 @ 14:45) (18 - 18)  SpO2: 100% (09-29-22 @ 14:45) (100% - 100%)  Wt(kg): --Vital Signs Last 24 Hrs  T(C): 37.2 (29 Sep 2022 14:45), Max: 37.2 (29 Sep 2022 14:45)  T(F): 98.9 (29 Sep 2022 14:45), Max: 98.9 (29 Sep 2022 14:45)  HR: 82 (29 Sep 2022 14:45) (78 - 84)  BP: 112/61 (29 Sep 2022 14:45) (94/64 - 122/73)  BP(mean): 72 (28 Sep 2022 21:48) (72 - 72)  RR: 18 (29 Sep 2022 14:45) (18 - 18)  SpO2: 100% (29 Sep 2022 14:45) (100% - 100%)    Parameters below as of 29 Sep 2022 14:45  Patient On (Oxygen Delivery Method): room air        PHYSICAL EXAM:  GENERAL: NAD  EYES: clear conjunctiva; EOMI  ENMT: Moist mucous membranes  NECK: Supple, No JVD, Normal thyroid  CHEST/LUNG: Clear to auscultation bilaterally; No rales, rhonchi, wheezing, or rubs  HEART: S1, S2, Regular rate and rhythm  ABDOMEN: Soft, Nontender, Nondistended; Bowel sounds present  NEURO: Alert & Oriented X3  EXTREMITIES: No LE edema, no calf tenderness  LYMPH: No lymphadenopathy noted  SKIN: No rashes or lesions  : R flank neph tube, dressing CDi    Consultant(s) Notes Reviewed:  [x ] YES  [ ] NO  Care Discussed with Consultants/Other Providers [ x] YES  [ ] NO    LABS:                        8.3    8.17  )-----------( 243      ( 29 Sep 2022 07:00 )             27.3       CAPILLARY BLOOD GLUCOSE      RADIOLOGY & ADDITIONAL TESTS:    Imaging Personally Reviewed:  [ x] YES  [ ] NO  < from: CT Abdomen and Pelvis No Cont (09.20.22 @ 01:34) >    ACC: [id number] EXAM:  CT ABDOMEN AND PELVIS                        ACC: [id number] EXAM:  CT CHEST                          PROCEDURE DATE:  09/20/2022          INTERPRETATION:  CLINICAL INFORMATION: Chest pain. Right nephrostomy   tube, hematuria.    COMPARISON: CT chest, abdomen and pelvis 7/31/2022, abdominal sonogram   8/18/2022    CONTRAST/COMPLICATIONS:  IV Contrast: None  Oral Contrast: None  Complications: None    PROCEDURE:  CT of the Chest, Abdomen and Pelvis was performed without intravenous   contrast.  Intravenous contrast: None.  Oral contrast: positive contrast was administered.  Sagittal and coronal reformats were performed.    FINDINGS:    CHEST:    LUNGS AND LARGE AIRWAYS: Patent central airways. Bilateral pulmonary   noduleshave again increased in size and number since the recent prior   chest CT. For example, left lower lobe nodule now measures 2.8 x 2.0 cm   and has cavitation (prior 2.5 x 1.9 cm). A right lower lobe nodule now   measures 2.5 x 2.0 cm (prior 2.0 x 1.8cm) with interval resolution of   cavitation within this nodule. Adjacent nodule now appears cavitary.  PLEURA: No pleural effusion. No pneumothorax.  VESSELS: Within normal limits.  HEART: Heart size is normal. Trace pericardial effusion. Distal leads of   cardiac device overlying the right atrium and ventricle, unchanged.  MEDIASTINUM AND FLORENTIN: No lymphadenopathy.  CHEST WALL AND LOWER NECK: Left chest wall dual-lead cardiac device.    ABDOMEN AND PELVIS:    LIVER: 2.4 cm hypodense lesion in the right hepatic lobe not   significantly changed from the prior exam. There are several new   hypodense lesions within the left and right hepatic lobes as for example   the left hepatic lobe on series 4 image 114 measures 2 cm and in the   right hepatic lobe 0.9 cm on series 4 image 134.  BILE DUCTS: Normal caliber.  GALLBLADDER: Within normal limits.  SPLEEN: Within normal limits.  PANCREAS: Within normal limits.  ADRENALS: Within normal limits.  KIDNEYS/URETERS: Right nephrostomy tube in place. No hydronephrosis or   perinephric stranding on the right. Severe left hydronephrosis and   scattered calcifications, unchanged. Minimal left perinephric stranding   similar to the prior exam.    BLADDER: Marked bladder wall thickening similar to the prior exam.  REPRODUCTIVE ORGANS: Uterus and adnexal structures similar to prior exam.    BOWEL: No bowel obstruction. Appendix is normal. No bowel wall thickening   or pericolonic inflammatory change. Underdistended stomach which limits   evaluation of the wall thickness.  PERITONEUM: No ascites. No free air.  VESSELS:  Within normal limits.  RETROPERITONEUM: Retroperitoneal adenopathy is not significantly changed.   Left periaortic lymph node measures 2.5 cm  ABDOMINAL WALL: Within normal limits.  BONES: No aggressive osteoblastic or lytic lesion.    IMPRESSION:  Chest CT: Interval progression of cavitary pulmonary metastatic disease   with increased size and number of pulmonary nodules. No evidence of   pneumonia.    CT abdomen/pelvis: Right nephrostomy tube in place without   hydronephrosis. Stable superior left hydronephrosis. Marked irregular   bladder wall thickening, unchanged.  Progression of liver metastasis. Stable retroperitoneal adenopathy.    --- End of Report ---            SARAI DUGGAN MD; Attending Radiologist  This document has been electronically signed. Sep 20 2022  3:17AM    < end of copied text >  < from: CT Chest No Cont (09.20.22 @ 01:33) >    ACC: [id number] EXAM:  CT ABDOMEN AND PELVIS                        ACC: [id number] EXAM:  CT CHEST                          PROCEDURE DATE:  09/20/2022          INTERPRETATION:  CLINICAL INFORMATION: Chest pain. Right nephrostomy   tube, hematuria.    COMPARISON: CT chest, abdomen and pelvis 7/31/2022, abdominal sonogram   8/18/2022    CONTRAST/COMPLICATIONS:  IV Contrast: None  Oral Contrast: None  Complications: None    PROCEDURE:  CT of the Chest, Abdomen and Pelvis was performed without intravenous   contrast.  Intravenous contrast: None.  Oral contrast: positive contrast was administered.  Sagittal and coronal reformats were performed.    FINDINGS:    CHEST:    LUNGS AND LARGE AIRWAYS: Patent central airways. Bilateral pulmonary   noduleshave again increased in size and number since the recent prior   chest CT. For example, left lower lobe nodule now measures 2.8 x 2.0 cm   and has cavitation (prior 2.5 x 1.9 cm). A right lower lobe nodule now   measures 2.5 x 2.0 cm (prior 2.0 x 1.8cm) with interval resolution of   cavitation within this nodule. Adjacent nodule now appears cavitary.  PLEURA: No pleural effusion. No pneumothorax.  VESSELS: Within normal limits.  HEART: Heart size is normal. Trace pericardial effusion. Distal leads of   cardiac device overlying the right atrium and ventricle, unchanged.  MEDIASTINUM AND FLORENTIN: No lymphadenopathy.  CHEST WALL AND LOWER NECK: Left chest wall dual-lead cardiac device.    ABDOMEN AND PELVIS:    LIVER: 2.4 cm hypodense lesion in the right hepatic lobe not   significantly changed from the prior exam. There are several new   hypodense lesions within the left and right hepatic lobes as for example   the left hepatic lobe on series 4 image 114 measures 2 cm and in the   right hepatic lobe 0.9 cm on series 4 image 134.  BILE DUCTS: Normal caliber.  GALLBLADDER: Within normal limits.  SPLEEN: Within normal limits.  PANCREAS: Within normal limits.  ADRENALS: Within normal limits.  KIDNEYS/URETERS: Right nephrostomy tube in place. No hydronephrosis or   perinephric stranding on the right. Severe left hydronephrosis and   scattered calcifications, unchanged. Minimal left perinephric stranding   similar to the prior exam.    BLADDER: Marked bladder wall thickening similar to the prior exam.  REPRODUCTIVE ORGANS: Uterus and adnexal structures similar to prior exam.    BOWEL: No bowel obstruction. Appendix is normal. No bowel wall thickening   or pericolonic inflammatory change. Underdistended stomach which limits   evaluation of the wall thickness.  PERITONEUM: No ascites. No free air.  VESSELS:  Within normal limits.  RETROPERITONEUM: Retroperitoneal adenopathy is not significantly changed.   Left periaortic lymph node measures 2.5 cm  ABDOMINAL WALL: Within normal limits.  BONES: No aggressive osteoblastic or lytic lesion.    IMPRESSION:  Chest CT: Interval progression of cavitary pulmonary metastatic disease   with increased size and number of pulmonary nodules. No evidence of   pneumonia.    CT abdomen/pelvis: Right nephrostomy tube in place without   hydronephrosis. Stable superior left hydronephrosis. Marked irregular   bladder wall thickening, unchanged.  Progression of liver metastasis. Stable retroperitoneal adenopathy.    --- End of Report ---            SARAI DUGGAN MD; Attending Radiologist  This document has been electronically signed. Sep 20 2022  3:17AM    < end of copied text >

## 2022-09-29 NOTE — PROGRESS NOTE ADULT - ASSESSMENT
( Note written / Date of service 09-29-22 )    ==================>> MEDICATIONS <<====================    amoxicillin 250 milliGRAM(s) Oral every 8 hours  enoxaparin Injectable 40 milliGRAM(s) SubCutaneous every 24 hours  lactobacillus acidophilus 1 Tablet(s) Oral daily  polyethylene glycol 3350 17 Gram(s) Oral two times a day  senna 2 Tablet(s) Oral at bedtime  sodium chloride 0.9%. 1000 milliLiter(s) IV Continuous <Continuous>    MEDICATIONS  (PRN):  acetaminophen     Tablet .. 650 milliGRAM(s) Oral every 6 hours PRN Mild Pain (1 - 3)  traMADol 50 milliGRAM(s) Oral every 6 hours PRN Moderate Pain (4 - 6)    ___________  Active diet:  Diet, Regular  ___________________    ==================>> VITAL SIGNS <<==================    Vital Signs Last 24 HrsT(C): 36.4 (09-29-22 @ 06:02)  T(F): 97.6 (09-29-22 @ 06:02), Max: 98.7 (09-28-22 @ 21:48)  HR: 78 (09-29-22 @ 06:02) (78 - 84)  BP: 99/56 (09-29-22 @ 06:02)  RR: 18 (09-29-22 @ 06:02) (18 - 18)  SpO2: 100% (09-29-22 @ 06:02) (100% - 100%)      CAPILLARY BLOOD GLUCOSE         ==================>> LAB AND IMAGING <<==================                        8.3    8.17  )-----------( 243      ( 29 Sep 2022 07:00 )             27.3              WBC count:   8.17 <<== ,  8.59 <<== ,  8.41 <<== ,  6.93 <<==   Hemoglobin:   8.3 <<==,  9.2 <<==,  8.9 <<==,  8.5 <<==  platelets:  243 <==, 280 <==, 291 <==, 277 <==, 300 <==    Creatinine:  1.13  <<==, 1.06  <<==, 0.95  <<==  Sodium:   136  <==, 137  <==, 137  <==       AST:          15 <==      ALT:        13  <==      AP:        89  <=     Bili:        0.2  <=    ____________________________    M I C R O B I O L O G Y :    Culture - Blood (collected 20 Sep 2022 05:50)  Source: .Blood Blood  Final Report (25 Sep 2022 13:00):    No Growth Final    Culture - Blood (collected 20 Sep 2022 05:35)  Source: .Blood Blood  Final Report (25 Sep 2022 13:00):    No Growth Final    Culture - Urine (collected 20 Sep 2022 00:10)  Source: Clean Catch Clean Catch (Midstream)  Final Report (22 Sep 2022 20:23):    >100,000 CFU/ml Enterococcus faecalis  Organism: Enterococcus faecalis (22 Sep 2022 20:23)  Organism: Enterococcus faecalis (22 Sep 2022 20:23)    Sensitivities:      -  Ampicillin: S <=2 Predicts results to ampicillin/sulbactam, amoxacillin-clavulanate and  piperacillin-tazobactam.      -  Ciprofloxacin: S <=1      -  Levofloxacin: S 2      -  Nitrofurantoin: S <=32 Should not be used to treat pyelonephritis.      -  Tetra/Doxy: R >8      -  Vancomycin: S 2      Method Type: PATRICIA        COVID-19 PCR: Detected (09-26-22 @ 16:05)  COVID-19 PCR: NotDetec (09-22-22 @ 13:40)  COVID-19 PCR: NotDetec (09-20-22 @ 06:00)     _________________________________________________________________________________________  ========>>  M E D I C A L   A T T E N D I N G    F O L L O W  U P  N O T E  <<=========  -----------------------------------------------------------------------------------------------------    - Patient seen and examined by me earlier today.   - In summary,  JEF HOUSE is a 56y year old woman admitted with UTI  - Patient today overall doing ok, comfortable, eating fairly , pain better controlled      pt tested positive for COVID !! + occasional cough with some phlegm otherwise doing ok        pt states again started having blood and clots from vagina today     ==================>> REVIEW OF SYSTEM <<=================    GEN: no fever, no chills, as above , weak  RESP: no SOB, as above , off O2   CVS: chest pain as above ( related to left flank pain) , no palpitations  GI: no abdominal pain, no nausea, no more diarrhea reported   : no dysuria, no frequency.. no more blood tinged urin in bag   Neuro: no headache, no dizziness  Derm : no itching, no rash    ==================>> PHYSICAL EXAM <<=================    GEN: A&O X 3 , NAD , somewhat uncomfortable, pleasant, calm , cachectic , weak appearing   HEENT: NCAT, PERRL, MMM, hearing intact  Neck: supple , no JVD appreciated  CVS: S1S2 , regular , No M/R/G appreciated  PULM: CTA B/L,  no W/R/R appreciated  ABD.: soft. non tender, non distended,  bowel sounds present  Extrem: intact pulses , no edema      nephrostomy bag / leg bag draining clear yellow urine   PSYCH : normal mood,  not anxious                             ( Note written / Date of service 09-29-22 )    ==================>> MEDICATIONS <<====================    amoxicillin 250 milliGRAM(s) Oral every 8 hours  enoxaparin Injectable 40 milliGRAM(s) SubCutaneous every 24 hours  lactobacillus acidophilus 1 Tablet(s) Oral daily  polyethylene glycol 3350 17 Gram(s) Oral two times a day  senna 2 Tablet(s) Oral at bedtime  sodium chloride 0.9%. 1000 milliLiter(s) IV Continuous <Continuous>    MEDICATIONS  (PRN):  acetaminophen     Tablet .. 650 milliGRAM(s) Oral every 6 hours PRN Mild Pain (1 - 3)  traMADol 50 milliGRAM(s) Oral every 6 hours PRN Moderate Pain (4 - 6)    ___________  Active diet:  Diet, Regular  ___________________    ==================>> VITAL SIGNS <<==================    Vital Signs Last 24 HrsT(C): 36.4 (09-29-22 @ 06:02)  T(F): 97.6 (09-29-22 @ 06:02), Max: 98.7 (09-28-22 @ 21:48)  HR: 78 (09-29-22 @ 06:02) (78 - 84)  BP: 99/56 (09-29-22 @ 06:02)  RR: 18 (09-29-22 @ 06:02) (18 - 18)  SpO2: 100% (09-29-22 @ 06:02) (100% - 100%)       ==================>> LAB AND IMAGING <<==================                        8.3    8.17  )-----------( 243      ( 29 Sep 2022 07:00 )             27.3     WBC count:   8.17 <<== ,  8.59 <<== ,  8.41 <<== ,  6.93 <<==   Hemoglobin:   8.3 <<==,  9.2 <<==,  8.9 <<==,  8.5 <<==  platelets:  243 <==, 280 <==, 291 <==, 277 <==, 300 <==    Creatinine:  1.13  <<==, 1.06  <<==, 0.95  <<==  Sodium:   136  <==, 137  <==, 137  <==    ____________________________    M I C R O B I O L O G Y :    Culture - Blood (collected 20 Sep 2022 05:50)  Source: .Blood Blood  Final Report (25 Sep 2022 13:00):    No Growth Final    Culture - Blood (collected 20 Sep 2022 05:35)  Source: .Blood Blood  Final Report (25 Sep 2022 13:00):    No Growth Final    Culture - Urine (collected 20 Sep 2022 00:10)  Source: Clean Catch Clean Catch (Midstream)  Final Report (22 Sep 2022 20:23):    >100,000 CFU/ml Enterococcus faecalis  Organism: Enterococcus faecalis (22 Sep 2022 20:23)  Organism: Enterococcus faecalis (22 Sep 2022 20:23)    Sensitivities:      -  Ampicillin: S <=2 Predicts results to ampicillin/sulbactam, amoxacillin-clavulanate and  piperacillin-tazobactam.      -  Ciprofloxacin: S <=1      -  Levofloxacin: S 2      -  Nitrofurantoin: S <=32 Should not be used to treat pyelonephritis.      -  Tetra/Doxy: R >8      -  Vancomycin: S 2      Method Type: PATRICIA        COVID-19 PCR: Detected (09-26-22 @ 16:05)  COVID-19 PCR: NotDetec (09-22-22 @ 13:40)  COVID-19 PCR: [facility] (09-20-22 @ 06:00)      _________________________________________________________________________________  ===============>>  A S S E S S M E N T   A N D   P L A N <<===============  ------------------------------------------------------------------------------------------    · Assessment	  56 year old female from home, with PMHX of Bladder cancer (previously on chemo until 5/2022) with  Dr. Renay Trinidad, chronic hydronephrosis  R nephrostomy tube, hyperparathyroidism, bradycardia with PPM coming in for hematuria. Of note patient with hx multidrug resistant UTI, recent nephrostomy tube exchange 8/22/22. Started on Cefepime based on previous UCX- ID Jennifer consulted. Pending urine culture   CT A/P: Right nephrostomy tube in place without hydronephrosis. Stable superior left hydronephrosis. Marked irregular bladder wall thickening, unchanged. Progression of liver metastasis. Hospital course complicated by chest pain, EKG with possible inferior ischemia- Dr Clark consulted     Problem/Plan - 1:  ·  Problem: Complicated UTI (urinary tract infection).   ·  Plan: P/W Hematuria in nephrostomy drainage bag   hx of multi drug resistant UTIs   on Oral Abx per ID   ID Rodriguez following  pain mgmt as above     *** + covid - 19 swab    pt with minimal symptoms, off O2       monitor        supportive care       nutrition / hydration, IS, vitamins     Problem/Plan - 2:  ·  Problem: Metastatic cancer.   ·  Plan: metastatic bladder CA   CT confirms progression of disease with mets to lung and liver   last chemo 05/2022  plan to continue treatment once discharged.  f/u onc as OP    ***  vaginal bleed    appears to be related to her malignancy / mets..     pt evaluated by GYN last admission but declined Pelvic exam    if bleeding continues, reconsult GYN in AM    monitor H.H     Problem/Plan - 3:  ·  Problem: Atypical chest pain.  ·  Plan: c/p flank pain that radiates to chest   monitor      Problem/Plan - 4:  ·  Problem: Hydronephrosis.  ·  Plan: s/p R nephrostomy tube exchange 08/2022  chronic stable left hydro   monitor urine output   trend BMP.     Problem/Plan - 5:  ·  Problem: Prophylactic measure.   ·  Plan: Lovenox for DVT ppx.      >>> hold if vag bleed continues     Problem/Plan - 6:  ·  Problem: Discharge planning issues.  ·  Plan: patient from home   will likely return home without needed   case management to follow up re safety as pt states dosn't have anyone to help her at home now     --------------------------------------------  Case discussed with pt, RN..  Education given on findings and plan of care  ___________________________  H. NIEVES Ochoa.   Pager: [phone]      .

## 2022-09-30 NOTE — PROGRESS NOTE ADULT - PROBLEM SELECTOR PLAN 3
c/p flank pain that radiates to chest   Troponin neg x2  EKG with possible ischemia   Dr Rosario following

## 2022-09-30 NOTE — PROGRESS NOTE ADULT - ASSESSMENT
seen and examined vs stable afebrile physical don ok   abd soft bs nml nt nd ,  lungs a/e fair no added sounds c/o vaginal bleeding from a few days  gyn aware    hgb stable   covid asymptomatic

## 2022-09-30 NOTE — PROGRESS NOTE ADULT - PROBLEM SELECTOR PLAN 6
Intermittent vaginal bleeding endorsed but none at this time.   Seen by GYN on 08/22  and at that time Pt deferred pelvic exam and pelvic sonogram as she was worked up on previous admission and recommended to follow up outpatient at [facility]. No acute interventions at that time.   Pt endorses that she wants to address with her oncologist for pelvic exam and for transvaginal US. Encouraged her to see a GYN oncologist and referral given.   H&H stable.   Monitor CBC daily.

## 2022-09-30 NOTE — PROGRESS NOTE ADULT - PROBLEM SELECTOR PLAN 8
24 hour discharge notice given  wants to stay today to monitor for adverse reaction.   Patient also c/o pain not being well managed. Started on Tramadol 50 mg Q 6 PRN.   SW to follow for safe discharge as patient endorses that she does not have anyone to care for her at home although she states that she lives with 2 family members.  Patient will need to follow up with her Oncologist and GYN Oncologist.    Pt refusing discharge, 24hr notice was given, pt appealed and was rejected. pt is appealing again

## 2022-09-30 NOTE — PROGRESS NOTE ADULT - SUBJECTIVE AND OBJECTIVE BOX
HPI:  This is a 56 year old female from home, with PMH of Anemia, Asthma, Bladder cancer (previously on chemo until 5/2022) with  Dr. Renay Trinidad, R nephrostomy tube, hyperparathyroidism, bradycardia with PPM coming in for chest pain, hematuria and diarrhea. Pt states that chest pain started 2 days ago, was sharp in nature, 7/10, relieved with tylenol. She Also noticed that she had hematuria in her nephrostomy bag and a clot was passed into it. She was recently at [facility] for a UTI and was given 2g of ampicillin, after which she developed diarrhea. Additionally she has left flank pain now.     In ED v/s: /70, HR 66, RR 16, T 98.3    CT A/P: Right nephrostomy tube in place without hydronephrosis. Stable superior left hydronephrosis. Marked irregular bladder wall thickening, unchanged. Progression of liver metastasis.  (20 Sep 2022 04:50)      Patient is a 56y old  Female who presents with a chief complaint of Chest Pain, UTI (29 Sep 2022 16:51)      INTERVAL HPI/OVERNIGHT EVENTS:  T(C): 36.4 (09-30-22 @ 13:10), Max: 37 (09-30-22 @ 05:30)  HR: 72 (09-30-22 @ 13:10) (69 - 89)  BP: 114/67 (09-30-22 @ 13:10) (95/56 - 114/67)  RR: 18 (09-30-22 @ 13:10) (18 - 18)  SpO2: 99% (09-30-22 @ 13:10) (99% - 100%)  Wt(kg): --  I&O's Summary    29 Sep 2022 07:01  -  30 Sep 2022 07:00  --------------------------------------------------------  IN: 0 mL / OUT: 100 mL / NET: -100 mL        REVIEW OF SYSTEMS: denies fever, chills, SOB, palpitations, chest pain, abdominal pain, nausea, vomitting, diarrhea, constipation, dizziness    MEDICATIONS  (STANDING):  amoxicillin 250 milliGRAM(s) Oral every 8 hours  enoxaparin Injectable 40 milliGRAM(s) SubCutaneous every 24 hours  lactobacillus acidophilus 1 Tablet(s) Oral daily  polyethylene glycol 3350 17 Gram(s) Oral two times a day  senna 2 Tablet(s) Oral at bedtime  sodium chloride 0.9%. 1000 milliLiter(s) (80 mL/Hr) IV Continuous <Continuous>    MEDICATIONS  (PRN):  acetaminophen     Tablet .. 650 milliGRAM(s) Oral every 6 hours PRN Mild Pain (1 - 3)  traMADol 50 milliGRAM(s) Oral every 6 hours PRN Moderate Pain (4 - 6)      PHYSICAL EXAM:  GENERAL: NAD, well-groomed, well-developed  HEAD:  Atraumatic, Normocephalic  EYES: EOMI, PERRLA, conjunctiva and sclera clear  ENMT: No tonsillar erythema, exudates, or enlargement; Moist mucous membranes, Good dentition, No lesions  NECK: Supple, No JVD, Normal thyroid  NERVOUS SYSTEM:  Alert & Oriented X3, Good concentration; Motor Strength 5/5 B/L upper and lower extremities; DTRs 2+ intact and symmetric  CHEST/LUNG: Clear to percussion bilaterally; No rales, rhonchi, wheezing, or rubs  HEART: Regular rate and rhythm; No murmurs, rubs, or gallops  ABDOMEN: Soft, Nontender, Nondistended; Bowel sounds present  EXTREMITIES:  2+ Peripheral Pulses, No clubbing, cyanosis, or edema  LYMPH: No lymphadenopathy noted  SKIN: No rashes or lesions  LABS:                        9.2    8.70  )-----------( 280      ( 29 Sep 2022 20:11 )             30.9               CAPILLARY BLOOD GLUCOSE

## 2022-09-30 NOTE — PROGRESS NOTE ADULT - PROBLEM SELECTOR PLAN 1
P/W Hematuria in nephrostomy drainage bag   hx of multi drug resistant UTIs and hx of   Urine Cx growing Enterococcus specie  s/p abx 9/30  Id following

## 2022-09-30 NOTE — PROGRESS NOTE ADULT - SUBJECTIVE AND OBJECTIVE BOX
Patient is a 56y old  Female who presents with a chief complaint of Chest Pain, UTI (30 Sep 2022 16:25)    INTERVAL HPI/OVERNIGHT EVENTS: no acute events invernight    REVIEW OF SYSTEMS:  CONSTITUTIONAL: No fever, chills  ENMT:  No difficulty hearing, no change in vision  NECK: No pain or stiffness  RESPIRATORY: No cough, SOB  CARDIOVASCULAR: No chest pain, palpitations  GASTROINTESTINAL: No abdominal pain. No nausea, vomiting, or diarrhea  GENITOURINARY: No dysuria  NEUROLOGICAL: No HA  SKIN: No itching, burning, rashes, or lesions   LYMPH NODES: No enlarged glands  ENDOCRINE: No heat or cold intolerance; No hair loss  MUSCULOSKELETAL: No joint pain or swelling; No muscle, back, or extremity pain  PSYCHIATRIC: No depression, anxiety  HEME/LYMPH: No easy bruising, or bleeding gums    T(C): 36.4 (09-30-22 @ 13:10), Max: 37 (09-30-22 @ 05:30)  HR: 72 (09-30-22 @ 13:10) (69 - 89)  BP: 114/67 (09-30-22 @ 13:10) (95/56 - 114/67)  RR: 18 (09-30-22 @ 13:10) (18 - 18)  SpO2: 99% (09-30-22 @ 13:10) (99% - 100%)  Wt(kg): --Vital Signs Last 24 Hrs  T(C): 36.4 (30 Sep 2022 13:10), Max: 37 (30 Sep 2022 05:30)  T(F): 97.5 (30 Sep 2022 13:10), Max: 98.6 (30 Sep 2022 05:30)  HR: 72 (30 Sep 2022 13:10) (69 - 89)  BP: 114/67 (30 Sep 2022 13:10) (95/56 - 114/67)  BP(mean): --  RR: 18 (30 Sep 2022 13:10) (18 - 18)  SpO2: 99% (30 Sep 2022 13:10) (99% - 100%)    Parameters below as of 30 Sep 2022 13:10  Patient On (Oxygen Delivery Method): room air    MEDICATIONS  (STANDING):  amoxicillin 250 milliGRAM(s) Oral every 8 hours  enoxaparin Injectable 40 milliGRAM(s) SubCutaneous every 24 hours  lactobacillus acidophilus 1 Tablet(s) Oral daily  polyethylene glycol 3350 17 Gram(s) Oral two times a day  senna 2 Tablet(s) Oral at bedtime  sodium chloride 0.9%. 1000 milliLiter(s) (80 mL/Hr) IV Continuous <Continuous>    MEDICATIONS  (PRN):  acetaminophen     Tablet .. 650 milliGRAM(s) Oral every 6 hours PRN Mild Pain (1 - 3)  traMADol 50 milliGRAM(s) Oral every 6 hours PRN Moderate Pain (4 - 6)      PHYSICAL EXAM:  GENERAL: NAD  EYES: clear conjunctiva; EOMI  ENMT: Moist mucous membranes  NECK: Supple, No JVD, Normal thyroid  CHEST/LUNG: Clear to auscultation bilaterally; No rales, rhonchi, wheezing, or rubs  HEART: S1, S2, Regular rate and rhythm  ABDOMEN: Soft, Nontender, Nondistended; Bowel sounds present  NEURO: Alert & Oriented X3  EXTREMITIES: No LE edema, no calf tenderness  LYMPH: No lymphadenopathy noted  SKIN: No rashes or lesions  Gu: R neph tube site dressing CDi    Consultant(s) Notes Reviewed:  [x ] YES  [ ] NO  Care Discussed with Consultants/Other Providers [ x] YES  [ ] NO    LABS:                        9.2    8.70  )-----------( 280      ( 29 Sep 2022 20:11 )             30.9       CAPILLARY BLOOD GLUCOSE      RADIOLOGY & ADDITIONAL TESTS:    Imaging Personally Reviewed:  [ x] YES  [ ] NO    < from: CT Abdomen and Pelvis No Cont (09.20.22 @ 01:34) >  ACC: [id number] EXAM:  CT ABDOMEN AND PELVIS                        ACC: [id number] EXAM:  CT CHEST                          PROCEDURE DATE:  09/20/2022          INTERPRETATION:  CLINICAL INFORMATION: Chest pain. Right nephrostomy   tube, hematuria.    COMPARISON: CT chest, abdomen and pelvis 7/31/2022, abdominal sonogram   8/18/2022    CONTRAST/COMPLICATIONS:  IV Contrast: None  Oral Contrast: None  Complications: None    PROCEDURE:  CT of the Chest, Abdomen and Pelvis was performed without intravenous   contrast.  Intravenous contrast: None.  Oral contrast: positive contrast was administered.  Sagittal and coronal reformats were performed.    FINDINGS:    CHEST:    LUNGS AND LARGE AIRWAYS: Patent central airways. Bilateral pulmonary   noduleshave again increased in size and number since the recent prior   chest CT. For example, left lower lobe nodule now measures 2.8 x 2.0 cm   and has cavitation (prior 2.5 x 1.9 cm). A right lower lobe nodule now   measures 2.5 x 2.0 cm (prior 2.0 x 1.8cm) with interval resolution of   cavitation within this nodule. Adjacent nodule now appears cavitary.  PLEURA: No pleural effusion. No pneumothorax.  VESSELS: Within normal limits.  HEART: Heart size is normal. Trace pericardial effusion. Distal leads of   cardiac device overlying the right atrium and ventricle, unchanged.  MEDIASTINUM AND FLORENTIN: No lymphadenopathy.  CHEST WALL AND LOWER NECK: Left chest wall dual-lead cardiac device.    ABDOMEN AND PELVIS:    LIVER: 2.4 cm hypodense lesion in the right hepatic lobe not   significantly changed from the prior exam. There are several new   hypodense lesions within the left and right hepatic lobes as for example   the left hepatic lobe on series 4 image 114 measures 2 cm and in the   right hepatic lobe 0.9 cm on series 4 image 134.  BILE DUCTS: Normal caliber.  GALLBLADDER: Within normal limits.  SPLEEN: Within normal limits.  PANCREAS: Within normal limits.  ADRENALS: Within normal limits.  KIDNEYS/URETERS: Right nephrostomy tube in place. No hydronephrosis or   perinephric stranding on the right. Severe left hydronephrosis and   scattered calcifications, unchanged. Minimal left perinephric stranding   similar to the prior exam.    BLADDER: Marked bladder wall thickening similar to the prior exam.  REPRODUCTIVE ORGANS: Uterus and adnexal structures similar to prior exam.    BOWEL: No bowel obstruction. Appendix is normal. No bowel wall thickening   or pericolonic inflammatory change. Underdistended stomach which limits   evaluation of the wall thickness.  PERITONEUM: No ascites. No free air.  VESSELS:  Within normal limits.  RETROPERITONEUM: Retroperitoneal adenopathy is not significantly changed.   Left periaortic lymph node measures 2.5 cm  ABDOMINAL WALL: Within normal limits.  BONES: No aggressive osteoblastic or lytic lesion.    IMPRESSION:  Chest CT: Interval progression of cavitary pulmonary metastatic disease   with increased size and number of pulmonary nodules. No evidence of   pneumonia.    CT abdomen/pelvis: Right nephrostomy tube in place without   hydronephrosis. Stable superior left hydronephrosis. Marked irregular   bladder wall thickening, unchanged.  Progression of liver metastasis. Stable retroperitoneal adenopathy.    --- End of Report ---            SARAI DUGGAN MD; Attending Radiologist  This document has been electronically signed. Sep 20 2022  3:17AM    < end of copied text >

## 2022-10-01 NOTE — PROGRESS NOTE ADULT - ASSESSMENT
seen and examined vsstable afebrile physical done ok denies cp or sob or palp   has mild cough  vaginal bleeding better  hgb stable   labs noted   hgb 8.3 stable    covid pos has mild cough  Pulse Ox  nml  will get cxr   h/o bladder ca     cad s/p PPM    DR AMADOR WILL FOLLOW

## 2022-10-01 NOTE — PROGRESS NOTE ADULT - SUBJECTIVE AND OBJECTIVE BOX
HPI:  This is a 56 year old female from home, with PMH of Anemia, Asthma, Bladder cancer (previously on chemo until 5/2022) with  Dr. Renay Trinidad, R nephrostomy tube, hyperparathyroidism, bradycardia with PPM coming in for chest pain, hematuria and diarrhea. Pt states that chest pain started 2 days ago, was sharp in nature, 7/10, relieved with tylenol. She Also noticed that she had hematuria in her nephrostomy bag and a clot was passed into it. She was recently at [facility] for a UTI and was given 2g of ampicillin, after which she developed diarrhea. Additionally she has left flank pain now.     In ED v/s: /70, HR 66, RR 16, T 98.3    CT A/P: Right nephrostomy tube in place without hydronephrosis. Stable superior left hydronephrosis. Marked irregular bladder wall thickening, unchanged. Progression of liver metastasis.  (20 Sep 2022 04:50)      Patient is a 56y old  Female who presents with a chief complaint of Chest Pain, UTI (30 Sep 2022 17:30)      INTERVAL HPI/OVERNIGHT EVENTS:  T(C): 36.7 (10-01-22 @ 05:44), Max: 37.2 (09-30-22 @ 20:53)  HR: 68 (10-01-22 @ 05:44) (68 - 70)  BP: 99/61 (10-01-22 @ 05:44) (95/60 - 99/61)  RR: 18 (10-01-22 @ 05:44) (18 - 18)  SpO2: 99% (10-01-22 @ 05:44) (97% - 99%)  Wt(kg): --  I&O's Summary      REVIEW OF SYSTEMS: denies fever, chills, SOB, palpitations, chest pain, abdominal pain, nausea, vomitting, diarrhea, constipation, dizziness    MEDICATIONS  (STANDING):  amoxicillin 250 milliGRAM(s) Oral every 8 hours  enoxaparin Injectable 40 milliGRAM(s) SubCutaneous every 24 hours  lactobacillus acidophilus 1 Tablet(s) Oral daily  polyethylene glycol 3350 17 Gram(s) Oral two times a day  senna 2 Tablet(s) Oral at bedtime  sodium chloride 0.9%. 1000 milliLiter(s) (80 mL/Hr) IV Continuous <Continuous>  traMADol 25 milliGRAM(s) Oral every 6 hours    MEDICATIONS  (PRN):  acetaminophen     Tablet .. 650 milliGRAM(s) Oral every 6 hours PRN Mild Pain (1 - 3)      PHYSICAL EXAM:  GENERAL: NAD, well-groomed, well-developed  HEAD:  Atraumatic, Normocephalic  EYES: EOMI, PERRLA, conjunctiva and sclera clear  ENMT: No tonsillar erythema, exudates, or enlargement; Moist mucous membranes, Good dentition, No lesions  NECK: Supple, No JVD, Normal thyroid  NERVOUS SYSTEM:  Alert & Oriented X3, Good concentration; Motor Strength 5/5 B/L upper and lower extremities; DTRs 2+ intact and symmetric  CHEST/LUNG: Clear to percussion bilaterally; No rales, rhonchi, wheezing, or rubs  HEART: Regular rate and rhythm; No murmurs, rubs, or gallops  ABDOMEN: Soft, Nontender, Nondistended; Bowel sounds present  EXTREMITIES:  2+ Peripheral Pulses, No clubbing, cyanosis, or edema  LYMPH: No lymphadenopathy noted  SKIN: No rashes or lesions  LABS:                        8.3    7.47  )-----------( 273      ( 01 Oct 2022 10:20 )             29.0     10-01    138  |  104  |  18  ----------------------------<  87  3.9   |  27  |  0.86    Ca    9.7      01 Oct 2022 07:06          CAPILLARY BLOOD GLUCOSE

## 2022-10-02 NOTE — CONSULT NOTE ADULT - SUBJECTIVE AND OBJECTIVE BOX
CHIEF COMPLAINT:Patient is a 56y old  Female who presents with a chief complaint of Chest Pain, UTI (02 Oct 2022 11:31)      HPI:  This is a 56 year old female from home, with PMH of Anemia, Asthma, Bladder cancer (previously on chemo until 5/2022) with  Dr. Renay Trinidad R nephrostomy tube, hyperparathyroidism, bradycardia with PPM coming in for chest pain, hematuria and diarrhea. Pt states that chest pain started 2 days ago, was sharp in nature, 7/10, relieved with tylenol. She Also noticed that she had hematuria in her nephrostomy bag and a clot was passed into it. She was recently at [facility] for a UTI and was given 2g of ampicillin, after which she developed diarrhea. Additionally she has left flank pain now.   CT A/P: Right nephrostomy tube in place without hydronephrosis. Stable superior left hydronephrosis. Marked irregular bladder wall thickening, unchanged. Progression of liver metastasis.  pt has called me several time wants to see me.      PAST MEDICAL & SURGICAL HISTORY:  Anemia      Asthma      HLD (hyperlipidemia)      Pacemaker  [employer]      Bladder cancer      HTN (hypertension)      Parathyroid tumor      Liver cyst      Hydronephrosis  has right Nephrostomy tube - dressing intact      Bradycardia      History of renal calculi      Birthmark      H/O myomectomy      Presence of cardiac pacemaker      H/O hydronephrosis  s/p Right Perc nephrostomy tube placement 02/2021, exchange 06/2021          MEDICATIONS  (STANDING):  amoxicillin 250 milliGRAM(s) Oral every 8 hours  enoxaparin Injectable 40 milliGRAM(s) SubCutaneous every 24 hours  lactobacillus acidophilus 1 Tablet(s) Oral daily  polyethylene glycol 3350 17 Gram(s) Oral two times a day  senna 2 Tablet(s) Oral at bedtime  sodium chloride 0.9%. 1000 milliLiter(s) (80 mL/Hr) IV Continuous <Continuous>  traMADol 25 milliGRAM(s) Oral every 6 hours    MEDICATIONS  (PRN):  acetaminophen     Tablet .. 650 milliGRAM(s) Oral every 6 hours PRN Mild Pain (1 - 3)      FAMILY HISTORY:  Family history of prostate cancer (Father)    Family history of CHF (congestive heart failure) (Father)    Family history of atrial fibrillation (Father)        SOCIAL HISTORY:    [x ] Non-smoker  [ ] Smoker  [ ] Alcohol    Allergies    No Known Allergies    Intolerances    	    REVIEW OF SYSTEMS:  CONSTITUTIONAL: No fever, weight loss, or fatigue  EYES: No eye pain, visual disturbances, or discharge  ENT:  No difficulty hearing, tinnitus, vertigo; No sinus or throat pain  NECK: No pain or stiffness  RESPIRATORY: No cough, wheezing, chills or hemoptysis; No Shortness of Breath  CARDIOVASCULAR: No chest pain, palpitations, passing out, dizziness, or leg swelling  GASTROINTESTINAL: No abdominal or epigastric pain. No nausea, vomiting, or hematemesis; No diarrhea or constipation. No melena or hematochezia.  GENITOURINARY: No dysuria, frequency, hematuria, or incontinence, vaginal bleedinhg  NEUROLOGICAL: No headaches, memory loss, loss of strength, numbness, or tremors  SKIN: No itching, burning, rashes, or lesions   LYMPH Nodes: No enlarged glands  ENDOCRINE: No heat or cold intolerance; No hair loss  MUSCULOSKELETAL: No joint pain or swelling; No muscle, back, or extremity pain  PSYCHIATRIC: No depression, anxiety, mood swings, or difficulty sleeping  HEME/LYMPH: No easy bruising, or bleeding gums  ALLERGY AND IMMUNOLOGIC: No hives or eczema	    [ ] All others negative	  [ ] Unable to obtain    PHYSICAL EXAM:  T(C): 36.6 (10-02-22 @ 05:20), Max: 36.8 (10-01-22 @ 20:28)  HR: 66 (10-02-22 @ 05:20) (66 - 69)  BP: 94/62 (10-02-22 @ 05:20) (92/60 - 98/60)  RR: 18 (10-02-22 @ 05:20) (18 - 18)  SpO2: 100% (10-02-22 @ 05:20) (92% - 100%)  Wt(kg): --  I&O's Summary    01 Oct 2022 07:01  -  02 Oct 2022 07:00  --------------------------------------------------------  IN: 0 mL / OUT: 575 mL / NET: -575 mL        Appearance: Normal	  HEENT:   Normal oral mucosa, PERRL, EOMI	  Lymphatic: No lymphadenopathy  Cardiovascular: Normal S1 S2, No JVD, + murmurs, No edema  Respiratoryrhonchi  Psychiatry: A & O x 3, Mood & affect appropriate  Gastrointestinal:  Soft, Non-tender, + BS	  Skin: No rashes, No ecchymoses, No cyanosis	  Neurologic: Non-focal  Extremities: Normal range of motion, No clubbing, cyanosis or edema  Vascular: Peripheral pulses palpable 2+ bilaterally    TELEMETRY: 	    ECG:  	  RADIOLOGY:  OTHER: 	  	  LABS:	 	    CARDIAC MARKERS:                              7.3    7.42  )-----------( 254      ( 02 Oct 2022 06:18 )             25.3     10-02    136  |  105  |  16  ----------------------------<  81  4.1   |  25  |  1.00    Ca    9.0      02 Oct 2022 06:18    TPro  7.0  /  Alb  2.1<L>  /  TBili  0.1<L>  /  DBili  x   /  AST  20  /  ALT  13  /  AlkPhos  84  10-02    proBNP:   Lipid Profile:   HgA1c:   TSH:       PREVIOUS DIAGNOSTIC TESTING:      < from: 12 Lead ECG (09.21.22 @ 11:30) >  Diagnosis Line Normal sinus rhythm  Possible Lateral infarct , age undetermined  ST & T wave abnormality, consider inferior ischemia  Abnormal ECG    < from: Transthoracic Echocardiogram (01.29.16 @ 07:16) >  1. Normal mitral valve. Mild mitral regurgitation.  2. Normal trileaflet aortic valve.  3. Normal aortic root.  4. Normal left atrium.  5. Normal left ventricular internal dimensions and wall  thicknesses.  6. Normal LeftVentricular Systolic Function,  (EF = 55 to  60%)  7. Grade II diastolic dysfunction  8. Normal right atrium.  9. Normal right ventricular size and function. A device  lead is visualized in the right heart.  10. RA Pressure is 10 mm Hg.  11. RVS Pressure is 35 mm Hg.  12. There is mild tricuspid regurgitation.  13. Normal pericardium with no pericardial effusion.    < from: CT Abdomen and Pelvis No Cont (09.20.22 @ 01:34) >  Chest CT: Interval progression of cavitary pulmonary metastatic disease   with increased size and number of pulmonary nodules. No evidence of   pneumonia.    CT abdomen/pelvis: Right nephrostomy tube in place without   hydronephrosis. Stable superior left hydronephrosis. Marked irregular   bladder wall thickening, unchanged.  Progression of liver metastasis. Stable retroperitoneal adenopathy.

## 2022-10-02 NOTE — CONSULT NOTE ADULT - ASSESSMENT
This is a 56 year old female from home, with PMH of Anemia, Asthma, Bladder cancer (previously on chemo until 5/2022) with  Dr. Renay Trinidad, R nephrostomy tube, hyperparathyroidism, bradycardia with PPM coming in for chest pain, hematuria and diarrhea. Pt states that chest pain started 2 days ago, was sharp in nature, 7/10, relieved with tylenol. She Also noticed that she had hematuria in her nephrostomy bag and a clot was passed into it. She was recently at [facility] for a UTI and was given 2g of ampicillin, after which she developed diarrhea. Additionally she has left flank pain now.   CT A/P: Right nephrostomy tube in place without hydronephrosis. Stable superior left hydronephrosis. Marked irregular bladder wall thickening, unchanged. Progression of liver metastasis.  pt has called me several time wants to see me.  pt with metastatic bladder ca with abnormal ecg , anemia.  check cbc, transfuse as needed pt refusing transfusion now  will compare ecg with the previous ecg, pt now has no chest pain  covid  + asymptomatic, on dvt prophylaxis  onc recommendation
Patient is a 56y old  Female from home, with PMH of Anemia, Asthma, Bladder cancer (previously on chemo until 5/2022) with  Dr. Renay Trinidad, Right nephrostomy tube, hyperparathyroidism, bradycardia with PPM, now coming in for evaluation of chest pain, hematuria and diarrhea. She Also noticed that she had hematuria in her nephrostomy bag and a clot was passed into it. She was recently at [facility] for a UTI and was given 2g of ampicillin, after which she developed diarrhea. Additionally she has left flank pain. ON admission, she found to have no fever but positive Urine analysis. The CT A/P shows Right nephrostomy tube in place without hydronephrosis. Stable superior left hydronephrosis. Marked irregular bladder wall thickening, unchanged. Progression of liver metastasis. The ID consult requested to assist with further evaluation and antibiotic management.     # Recurrent Complicated UTI-  WBC >50 /HPF - ( 20 Sep 2022 00:10 )  # Right nephrostomy tube  #Metastatic Bladder cancer    would recommend:    1. Follow up Urine  and Blood cultures  2. Please start Cefepime until work up is done  3. Monitor h/h and transfuse as needed   4. Pain management as needed    d/w Patient and Covering NP, Bhargavi Regalado    will follow the patient with you and make further recommendation based on the clinical course and Lab results  Thank you for the opportunity to participate in Ms. HOUSE's care      Attending Attestation:    Spent more than 65 minutes on total encounter, more than 50 % of the visit was spent counseling and/or coordinating care by the Attending physician.

## 2022-10-03 NOTE — PROGRESS NOTE ADULT - PROBLEM SELECTOR PLAN 6
Intermittent vaginal bleeding endorsed but none at this time.   Seen by GYN on 08/22  and at that time Pt deferred pelvic exam and pelvic sonogram as she was worked up on previous admission and recommended to follow up outpatient at [facility]. No acute interventions at that time.   Pt endorses that she wants to address with her oncologist for pelvic exam and for transvaginal US. Encouraged her to see a GYN oncologist and referral given.   H&H stable.   Monitor CBC daily.

## 2022-10-03 NOTE — PROGRESS NOTE ADULT - ASSESSMENT
seen and examiend  on bed comfortable  has mild cough now   no fever  no cahnge in taste   denies cp or sob or palp    bleeding better    labs noted  hgb 7.7  a/p mild symptoms covid   again advised oob in chair   pulmonary exercise     watch hgb

## 2022-10-03 NOTE — PROGRESS NOTE ADULT - SUBJECTIVE AND OBJECTIVE BOX
Patient is a 56y old  Female who presents with a chief complaint of Chest Pain, UTI (02 Oct 2022 12:01)      INTERVAL HPI/OVERNIGHT EVENTS: no acute events overnight      REVIEW OF SYSTEMS:  CONSTITUTIONAL: No fever, chills  ENMT:  No difficulty hearing, no change in vision  NECK: No pain or stiffness  RESPIRATORY: No cough, SOB  CARDIOVASCULAR: No chest pain, palpitations  GASTROINTESTINAL: No abdominal pain. No nausea, vomiting, or diarrhea  GENITOURINARY: No dysuria  NEUROLOGICAL: No HA  SKIN: No itching, burning, rashes, or lesions   LYMPH NODES: No enlarged glands  ENDOCRINE: No heat or cold intolerance; No hair loss  MUSCULOSKELETAL: No joint pain or swelling; No muscle, back, or extremity pain  PSYCHIATRIC: No depression, anxiety  HEME/LYMPH: No easy bruising, or bleeding gums    T(C): 36.8 (10-03-22 @ 14:25), Max: 36.8 (10-02-22 @ 20:05)  HR: 78 (10-03-22 @ 16:17) (61 - 78)  BP: 122/55 (10-03-22 @ 16:17) (96/57 - 122/55)  RR: 17 (10-03-22 @ 14:25) (16 - 17)  SpO2: 100% (10-03-22 @ 14:25) (100% - 100%)  Wt(kg): --Vital Signs Last 24 Hrs  T(C): 36.8 (03 Oct 2022 14:25), Max: 36.8 (02 Oct 2022 20:05)  T(F): 98.2 (03 Oct 2022 14:25), Max: 98.2 (02 Oct 2022 20:05)  HR: 78 (03 Oct 2022 16:17) (61 - 78)  BP: 122/55 (03 Oct 2022 16:17) (96/57 - 122/55)  BP(mean): 69 (03 Oct 2022 16:17) (61 - 69)  RR: 17 (03 Oct 2022 14:25) (16 - 17)  SpO2: 100% (03 Oct 2022 14:25) (100% - 100%)    Parameters below as of 03 Oct 2022 14:25  Patient On (Oxygen Delivery Method): room air    MEDICATIONS  (STANDING):  amoxicillin 250 milliGRAM(s) Oral every 8 hours  enoxaparin Injectable 40 milliGRAM(s) SubCutaneous every 24 hours  lactobacillus acidophilus 1 Tablet(s) Oral daily  polyethylene glycol 3350 17 Gram(s) Oral two times a day  senna 2 Tablet(s) Oral at bedtime  sodium chloride 0.9%. 1000 milliLiter(s) (80 mL/Hr) IV Continuous <Continuous>  traMADol 25 milliGRAM(s) Oral every 6 hours    MEDICATIONS  (PRN):  acetaminophen     Tablet .. 650 milliGRAM(s) Oral every 6 hours PRN Mild Pain (1 - 3)      PHYSICAL EXAM:  GENERAL: NAD  EYES: clear conjunctiva; EOMI  ENMT: Moist mucous membranes  NECK: Supple, No JVD, Normal thyroid  CHEST/LUNG: Clear to auscultation bilaterally; No rales, rhonchi, wheezing, or rubs  HEART: S1, S2, Regular rate and rhythm  ABDOMEN: Soft, Nontender, Nondistended; Bowel sounds present  NEURO: Alert & Oriented X3  EXTREMITIES: No LE edema, no calf tenderness  LYMPH: No lymphadenopathy noted  SKIN: No rashes or lesions  : R  Neph tube site with dressing CDI    Consultant(s) Notes Reviewed:  [x ] YES  [ ] NO  Care Discussed with Consultants/Other Providers [ x] YES  [ ] NO    LABS:                        7.7    6.21  )-----------( 259      ( 03 Oct 2022 05:50 )             26.0     10-03    138  |  107  |  17  ----------------------------<  83  4.0   |  23  |  0.85    Ca    9.4      03 Oct 2022 05:50    TPro  7.0  /  Alb  2.1<L>  /  TBili  0.1<L>  /  DBili  x   /  AST  20  /  ALT  13  /  AlkPhos  84  10-02      CAPILLARY BLOOD GLUCOSE      RADIOLOGY & ADDITIONAL TESTS:    Imaging Personally Reviewed:  [x ] YES  [ ] NO  < from: Xray Chest 1 View- PORTABLE-Routine (Xray Chest 1 View- PORTABLE-Routine in AM.) (10.03.22 @ 14:47) >    ACC: [id number] EXAM:  XR CHEST PORTABLE ROUTINE 1V                          PROCEDURE DATE:  10/03/2022          INTERPRETATION:  AP erect chest on October 13, 2022 at 12:36 PM. Patient   has cough and is Covid positive.    Heart size is within normal limits.    Left-sided pacemaker again noted.    Multiple pulmonary metastases are again noted.    No infiltrate.    Metastatic areas have increased somewhat in number compared to August 16.    IMPRESSION: Pulmonary metastases somewhat increased in number.    --- End of Report ---            ERIC LYNN MD; Attending Radiologist  This document has been electronically signed. Oct  3 2022  2:50PM    < end of copied text >  < from: CT Abdomen and Pelvis No Cont (09.20.22 @ 01:34) >    ACC: [id number] EXAM:  CT ABDOMEN AND PELVIS                        ACC: [id number] EXAM:  CT CHEST                          PROCEDURE DATE:  09/20/2022          INTERPRETATION:  CLINICAL INFORMATION: Chest pain. Right nephrostomy   tube, hematuria.    COMPARISON: CT chest, abdomen and pelvis 7/31/2022, abdominal sonogram   8/18/2022    CONTRAST/COMPLICATIONS:  IV Contrast: None  Oral Contrast: None  Complications: None    PROCEDURE:  CT of the Chest, Abdomen and Pelvis was performed without intravenous   contrast.  Intravenous contrast: None.  Oral contrast: positive contrast was administered.  Sagittal and coronal reformats were performed.    FINDINGS:    CHEST:    LUNGS AND LARGE AIRWAYS: Patent central airways. Bilateral pulmonary   noduleshave again increased in size and number since the recent prior   chest CT. For example, left lower lobe nodule now measures 2.8 x 2.0 cm   and has cavitation (prior 2.5 x 1.9 cm). A right lower lobe nodule now   measures 2.5 x 2.0 cm (prior 2.0 x 1.8cm) with interval resolution of   cavitation within this nodule. Adjacent nodule now appears cavitary.  PLEURA: No pleural effusion. No pneumothorax.  VESSELS: Within normal limits.  HEART: Heart size is normal. Trace pericardial effusion. Distal leads of   cardiac device overlying the right atrium and ventricle, unchanged.  MEDIASTINUM AND FLORENTIN: No lymphadenopathy.  CHEST WALL AND LOWER NECK: Left chest wall dual-lead cardiac device.    ABDOMEN AND PELVIS:    LIVER: 2.4 cm hypodense lesion in the right hepatic lobe not   significantly changed from the prior exam. There are several new   hypodense lesions within the left and right hepatic lobes as for example   the left hepatic lobe on series 4 image 114 measures 2 cm and in the   right hepatic lobe 0.9 cm on series 4 image 134.  BILE DUCTS: Normal caliber.  GALLBLADDER: Within normal limits.  SPLEEN: Within normal limits.  PANCREAS: Within normal limits.  ADRENALS: Within normal limits.  KIDNEYS/URETERS: Right nephrostomy tube in place. No hydronephrosis or   perinephric stranding on the right. Severe left hydronephrosis and   scattered calcifications, unchanged. Minimal left perinephric stranding   similar to the prior exam.    BLADDER: Marked bladder wall thickening similar to the prior exam.  REPRODUCTIVE ORGANS: Uterus and adnexal structures similar to prior exam.    BOWEL: No bowel obstruction. Appendix is normal. No bowel wall thickening   or pericolonic inflammatory change. Underdistended stomach which limits   evaluation of the wall thickness.  PERITONEUM: No ascites. No free air.  VESSELS:  Within normal limits.  RETROPERITONEUM: Retroperitoneal adenopathy is not significantly changed.   Left periaortic lymph node measures 2.5 cm  ABDOMINAL WALL: Within normal limits.  BONES: No aggressive osteoblastic or lytic lesion.    IMPRESSION:  Chest CT: Interval progression of cavitary pulmonary metastatic disease   with increased size and number of pulmonary nodules. No evidence of   pneumonia.    CT abdomen/pelvis: Right nephrostomy tube in place without   hydronephrosis. Stable superior left hydronephrosis. Marked irregular   bladder wall thickening, unchanged.  Progression of liver metastasis. Stable retroperitoneal adenopathy.    --- End of Report ---            SARAI DUGGAN MD; Attending Radiologist  This document has been electronically signed. Sep 20 2022  3:17AM    < end of copied text >

## 2022-10-03 NOTE — PROGRESS NOTE ADULT - SUBJECTIVE AND OBJECTIVE BOX
HPI:  This is a 56 year old female from home, with PMH of Anemia, Asthma, Bladder cancer (previously on chemo until 5/2022) with  Dr. Renay Trinidad, R nephrostomy tube, hyperparathyroidism, bradycardia with PPM coming in for chest pain, hematuria and diarrhea. Pt states that chest pain started 2 days ago, was sharp in nature, 7/10, relieved with tylenol. She Also noticed that she had hematuria in her nephrostomy bag and a clot was passed into it. She was recently at [facility] for a UTI and was given 2g of ampicillin, after which she developed diarrhea. Additionally she has left flank pain now.     In ED v/s: /70, HR 66, RR 16, T 98.3    CT A/P: Right nephrostomy tube in place without hydronephrosis. Stable superior left hydronephrosis. Marked irregular bladder wall thickening, unchanged. Progression of liver metastasis.  (20 Sep 2022 04:50)      Patient is a 56y old  Female who presents with a chief complaint of Chest Pain, UTI (03 Oct 2022 17:01)      INTERVAL HPI/OVERNIGHT EVENTS:  T(C): 36.8 (10-03-22 @ 14:25), Max: 36.8 (10-02-22 @ 20:05)  HR: 78 (10-03-22 @ 16:17) (61 - 78)  BP: 122/55 (10-03-22 @ 16:17) (96/57 - 122/55)  RR: 17 (10-03-22 @ 14:25) (16 - 17)  SpO2: 100% (10-03-22 @ 14:25) (100% - 100%)  Wt(kg): --  I&O's Summary      REVIEW OF SYSTEMS: denies fever, chills, SOB, palpitations, chest pain, abdominal pain, nausea, vomitting, diarrhea, constipation, dizziness    MEDICATIONS  (STANDING):  enoxaparin Injectable 40 milliGRAM(s) SubCutaneous every 24 hours  lactobacillus acidophilus 1 Tablet(s) Oral daily  polyethylene glycol 3350 17 Gram(s) Oral two times a day  senna 2 Tablet(s) Oral at bedtime  sodium chloride 0.9%. 1000 milliLiter(s) (80 mL/Hr) IV Continuous <Continuous>  traMADol 25 milliGRAM(s) Oral every 6 hours    MEDICATIONS  (PRN):  acetaminophen     Tablet .. 650 milliGRAM(s) Oral every 6 hours PRN Mild Pain (1 - 3)      PHYSICAL EXAM:  GENERAL: NAD, well-groomed, well-developed  HEAD:  Atraumatic, Normocephalic  EYES: EOMI, PERRLA, conjunctiva and sclera clear  ENMT: No tonsillar erythema, exudates, or enlargement; Moist mucous membranes, Good dentition, No lesions  NECK: Supple, No JVD, Normal thyroid  NERVOUS SYSTEM:  Alert & Oriented X3, Good concentration; Motor Strength 5/5 B/L upper and lower extremities; DTRs 2+ intact and symmetric  CHEST/LUNG: Clear to percussion bilaterally; No rales, rhonchi, wheezing, or rubs  HEART: Regular rate and rhythm; No murmurs, rubs, or gallops  ABDOMEN: Soft, Nontender, Nondistended; Bowel sounds present  EXTREMITIES:  2+ Peripheral Pulses, No clubbing, cyanosis, or edema  LYMPH: No lymphadenopathy noted  SKIN: No rashes or lesions  LABS:                        7.7    6.21  )-----------( 259      ( 03 Oct 2022 05:50 )             26.0     10-03    138  |  107  |  17  ----------------------------<  83  4.0   |  23  |  0.85    Ca    9.4      03 Oct 2022 05:50    TPro  7.0  /  Alb  2.1<L>  /  TBili  0.1<L>  /  DBili  x   /  AST  20  /  ALT  13  /  AlkPhos  84  10-02        CAPILLARY BLOOD GLUCOSE

## 2022-10-04 NOTE — PROGRESS NOTE ADULT - SUBJECTIVE AND OBJECTIVE BOX
Patient is a 56y old  Female who presents with a chief complaint of Chest Pain, UTI (03 Oct 2022 18:10)    INTERVAL HPI/OVERNIGHT EVENTS: no acute events overnight    REVIEW OF SYSTEMS:  CONSTITUTIONAL: No fever, chills  ENMT:  No difficulty hearing, no change in vision  NECK: No pain or stiffness  RESPIRATORY: No cough, SOB  CARDIOVASCULAR: No chest pain, palpitations  GASTROINTESTINAL: No abdominal pain. No nausea, vomiting, or diarrhea  GENITOURINARY: No dysuria  NEUROLOGICAL: No HA  SKIN: No itching, burning, rashes, or lesions   LYMPH NODES: No enlarged glands  ENDOCRINE: No heat or cold intolerance; No hair loss  MUSCULOSKELETAL: No joint pain or swelling; No muscle, back, or extremity pain  PSYCHIATRIC: No depression, anxiety  HEME/LYMPH: No easy bruising, or bleeding gums    T(C): 36.8 (10-04-22 @ 04:42), Max: 37.1 (10-03-22 @ 20:20)  HR: 64 (10-04-22 @ 04:42) (64 - 78)  BP: 109/62 (10-04-22 @ 04:42) (96/57 - 122/55)  RR: 17 (10-04-22 @ 04:42) (17 - 17)  SpO2: 100% (10-04-22 @ 04:42) (100% - 100%)  Wt(kg): --Vital Signs Last 24 Hrs  T(C): 36.8 (04 Oct 2022 04:42), Max: 37.1 (03 Oct 2022 20:20)  T(F): 98.3 (04 Oct 2022 04:42), Max: 98.8 (03 Oct 2022 20:20)  HR: 64 (04 Oct 2022 04:42) (64 - 78)  BP: 109/62 (04 Oct 2022 04:42) (96/57 - 122/55)  BP(mean): 73 (04 Oct 2022 04:42) (69 - 73)  RR: 17 (04 Oct 2022 04:42) (17 - 17)  SpO2: 100% (04 Oct 2022 04:42) (100% - 100%)    Parameters below as of 04 Oct 2022 04:42  Patient On (Oxygen Delivery Method): room air        PHYSICAL EXAM:  GENERAL: NAD  EYES: clear conjunctiva; EOMI  ENMT: Moist mucous membranes  NECK: Supple, No JVD, Normal thyroid  CHEST/LUNG: Clear to auscultation bilaterally; No rales, rhonchi, wheezing, or rubs  HEART: S1, S2, Regular rate and rhythm  ABDOMEN: Soft, Nontender, Nondistended; Bowel sounds present  NEURO: Alert & Oriented X3  EXTREMITIES: No LE edema, no calf tenderness  LYMPH: No lymphadenopathy noted  SKIN: No rashes or lesions  : R neph tube with clear yellow urine     Consultant(s) Notes Reviewed:  [x ] YES  [ ] NO  Care Discussed with Consultants/Other Providers [ x] YES  [ ] NO    LABS:                        7.8    7.31  )-----------( 323      ( 04 Oct 2022 06:19 )             26.2     10-04    138  |  103  |  17  ----------------------------<  90  4.3   |  26  |  0.97    Ca    10.2      04 Oct 2022 06:19    TPro  7.0  /  Alb  2.6<L>  /  TBili  0.1<L>  /  DBili  x   /  AST  18  /  ALT  12  /  AlkPhos  81  10-04      CAPILLARY BLOOD GLUCOSE      RADIOLOGY & ADDITIONAL TESTS:    Imaging Personally Reviewed:  [x ] YES  [ ] NO  < from: Xray Chest 1 View- PORTABLE-Routine (Xray Chest 1 View- PORTABLE-Routine in AM.) (10.03.22 @ 14:47) >  ACC: [id number] EXAM:  XR CHEST PORTABLE ROUTINE 1V                          PROCEDURE DATE:  10/03/2022          INTERPRETATION:  AP erect chest on October 13, 2022 at 12:36 PM. Patient   has cough and is Covid positive.    Heart size is within normal limits.    Left-sided pacemaker again noted.    Multiple pulmonary metastases are again noted.    No infiltrate.    Metastatic areas have increased somewhat in number compared to August 16.    IMPRESSION: Pulmonary metastases somewhat increased in number.    --- End of Report ---            ERIC LYNN MD; Attending Radiologist  This document has been electronically signed. Oct  3 2022  2:50PM    < end of copied text >

## 2022-10-04 NOTE — PROGRESS NOTE ADULT - ASSESSMENT
_________________________________________________________________________________________  ========>>  M E D I C A L   A T T E N D I N G    F O L L O W  U P  N O T E  <<=========  -----------------------------------------------------------------------------------------------------    - Patient seen and examined by me earlier today.   - In summary,  JEF HOUSE is a 56y year old woman admitted with UTI  - Patient today overall doing ok, comfortable, eating fairly , pain overall controlled      vaginal bleeding decreased             pt states she passed another stone via nephrostomy tube ( + took picture)    ==================>> REVIEW OF SYSTEM <<=================    GEN: no fever, no chills, as above , weak  RESP: no SOB, as above , off O2   CVS: chest pain as above ( related to left flank pain) , no palpitations  GI: no abdominal pain, no nausea, no more diarrhea reported   : no dysuria, no frequency.. as above , no blood , as above   Neuro: no headache, no dizziness  Derm : no itching, no rash    ==================>> PHYSICAL EXAM <<=================    GEN: A&O X 3 , NAD , comfortable, pleasant, calm , cachectic , weak appearing   HEENT: NCAT, PERRL, MMM, hearing intact  Neck: supple , no JVD appreciated  CVS: S1S2 , regular , No M/R/G appreciated  PULM: CTA B/L,  no W/R/R appreciated  ABD.: soft. non tender, non distended,  bowel sounds present  Extrem: intact pulses , no edema      nephrostomy bag / leg bag draining clear yellow urine   PSYCH : normal mood,  not anxious                             ( Note written / Date of service 10-04-22 )    ==================>> MEDICATIONS <<====================    enoxaparin Injectable 40 milliGRAM(s) SubCutaneous every 24 hours  lactobacillus acidophilus 1 Tablet(s) Oral daily  polyethylene glycol 3350 17 Gram(s) Oral two times a day  senna 2 Tablet(s) Oral at bedtime  sodium chloride 0.9%. 1000 milliLiter(s) IV Continuous <Continuous>  traMADol 25 milliGRAM(s) Oral every 6 hours    MEDICATIONS  (PRN):  acetaminophen     Tablet .. 650 milliGRAM(s) Oral every 6 hours PRN Mild Pain (1 - 3)    ___________  Active diet:  Diet, Regular  ___________________    ==================>> VITAL SIGNS <<==================    Vital Signs Last 24 HrsT(C): 36.8 (10-04-22 @ 04:42)  T(F): 98.3 (10-04-22 @ 04:42), Max: 98.8 (10-03-22 @ 20:20)  HR: 64 (10-04-22 @ 04:42) (64 - 78)  BP: 109/62 (10-04-22 @ 04:42)  RR: 17 (10-04-22 @ 04:42) (17 - 17)  SpO2: 100% (10-04-22 @ 04:42) (100% - 100%)       ==================>> LAB AND IMAGING <<==================                        7.8    7.31  )-----------( 323      ( 04 Oct 2022 06:19 )             26.2        10-04    138  |  103  |  17  ----------------------------<  90  4.3   |  26  |  0.97    Ca    10.2      04 Oct 2022 06:19    TPro  7.0  /  Alb  2.6<L>  /  TBili  0.1<L>  /  DBili  x   /  AST  18  /  ALT  12  /  AlkPhos  81  10-04    WBC count:   7.31 <<== ,  6.21 <<== ,  7.42 <<== ,  7.47 <<== ,  7.21 <<== ,  8.70 <<==   Hemoglobin:   7.8 <<==,  7.7 <<==,  7.3 <<==,  8.3 <<==,  7.7 <<==,  9.2 <<==  platelets:  323 <==, 259 <==, 254 <==, 273 <==, 241 <==, 280 <==, 243 <==    Creatinine:  0.97  <<==, 0.85  <<==, 1.00  <<==, 0.86  <<==  Sodium:   138  <==, 138  <==, 136  <==, 138  <==         10-04-22 @ 06:19  Iron:     14 ug/dL  Iron %:   6 %  TIBC:     228 ug/dL       10-04-22 @ 06:19  Vit B12:  >2000 pg/mL  Folate:   7.6 ng/mL  _________________________________________________________________________________  ===============>>  A S S E S S M E N T   A N D   P L A N <<===============  ------------------------------------------------------------------------------------------    · Assessment	  56 year old female from home, with PMHX of Bladder cancer (previously on chemo until 5/2022) with  Dr. Renay Trinidad, chronic hydronephrosis  R nephrostomy tube, hyperparathyroidism, bradycardia with PPM coming in for hematuria. Of note patient with hx multidrug resistant UTI, recent nephrostomy tube exchange 8/22/22.      Problem/Plan - 1:  ·  Problem: Complicated UTI (urinary tract infection).   ·  Plan: P/W Hematuria in nephrostomy drainage bag   hx of multi drug resistant UTIs   finish Oral Abx per ID plan  ID Rodriguez following  pain mgmt as above     *** + covid - 19 swab    pt with minimal symptoms, off O2       monitor        supportive care       nutrition / hydration, IS, vitamins        doubtful pt needs isolation     Problem/Plan - 2:  ·  Problem: Metastatic cancer.   ·  Plan: metastatic bladder CA   CT confirms progression of disease with mets to lung and liver   last chemo 05/2022  plan to continue treatment once discharged.  f/u onc as OP    ***  vaginal bleed    appears to be related to her malignancy / mets..     pt evaluated by GYN last admission but declined Pelvic exam and sono     bleeding has lessened : monitor : OP follow up     monitor H.H     pt declining blood transfusion     ** Anemia    Iron deficiency    Folate deficiency  >> supplements as ordered  monitor     Problem/Plan - 3:  ·  Problem: Atypical chest pain.  ·  Plan: c/p flank pain that radiates to chest   monitor      Problem/Plan - 4:  ·  Problem: Hydronephrosis.  ·  Plan: s/p R nephrostomy tube exchange 08/2022  chronic stable left hydro   monitor urine output   trend BMP.     Problem/Plan - 5:  ·  Problem: Prophylactic measure.   ·  Plan: Lovenox for DVT ppx.    Discharge planning issues.    --------------------------------------------  Case discussed with pt, NP  Education given on findings and plan of care  ___________________________  H. NIEVES Ochoa.   Pager: [phone]    Dr Sewell will cover me 10/5/22      .

## 2022-10-05 NOTE — PROGRESS NOTE ADULT - PROBLEM SELECTOR PROBLEM 8
Prophylactic measure
Prophylactic measure
Discharge planning issues
Discharge planning issues
Prophylactic measure
Discharge planning issues

## 2022-10-05 NOTE — PROGRESS NOTE ADULT - SUBJECTIVE AND OBJECTIVE BOX
HPI:  This is a 56 year old female from home, with PMH of Anemia, Asthma, Bladder cancer (previously on chemo until 5/2022) with  Dr. Renay Trinidad, R nephrostomy tube, hyperparathyroidism, bradycardia with PPM coming in for chest pain, hematuria and diarrhea. Pt states that chest pain started 2 days ago, was sharp in nature, 7/10, relieved with tylenol. She Also noticed that she had hematuria in her nephrostomy bag and a clot was passed into it. She was recently at [facility] for a UTI and was given 2g of ampicillin, after which she developed diarrhea. Additionally she has left flank pain now.     In ED v/s: /70, HR 66, RR 16, T 98.3    CT A/P: Right nephrostomy tube in place without hydronephrosis. Stable superior left hydronephrosis. Marked irregular bladder wall thickening, unchanged. Progression of liver metastasis.  (20 Sep 2022 04:50)      Patient is a 56y old  Female who presents with a chief complaint of Chest Pain, UTI (05 Oct 2022 16:15)      INTERVAL HPI/OVERNIGHT EVENTS:  T(C): 36.2 (10-05-22 @ 13:59), Max: 36.8 (10-04-22 @ 20:40)  HR: 72 (10-05-22 @ 13:59) (63 - 73)  BP: 103/56 (10-05-22 @ 13:59) (93/49 - 110/56)  RR: 16 (10-05-22 @ 13:59) (16 - 18)  SpO2: 100% (10-05-22 @ 13:59) (100% - 100%)  Wt(kg): --  I&O's Summary      REVIEW OF SYSTEMS: denies fever, chills, SOB, palpitations, chest pain, abdominal pain, nausea, vomitting, diarrhea, constipation, dizziness    MEDICATIONS  (STANDING):  enoxaparin Injectable 40 milliGRAM(s) SubCutaneous every 24 hours  ferrous    sulfate 325 milliGRAM(s) Oral three times a day  folic acid 1 milliGRAM(s) Oral daily  lactobacillus acidophilus 1 Tablet(s) Oral daily  multivitamin 1 Tablet(s) Oral daily  polyethylene glycol 3350 17 Gram(s) Oral two times a day  senna 2 Tablet(s) Oral at bedtime  traMADol 25 milliGRAM(s) Oral every 6 hours    MEDICATIONS  (PRN):  acetaminophen     Tablet .. 650 milliGRAM(s) Oral every 6 hours PRN Mild Pain (1 - 3)      PHYSICAL EXAM:  GENERAL: NAD, well-groomed, well-developed  HEAD:  Atraumatic, Normocephalic  EYES: EOMI, PERRLA, conjunctiva and sclera clear  ENMT: No tonsillar erythema, exudates, or enlargement; Moist mucous membranes, Good dentition, No lesions  NECK: Supple, No JVD, Normal thyroid  NERVOUS SYSTEM:  Alert & Oriented X3, Good concentration; Motor Strength 5/5 B/L upper and lower extremities; DTRs 2+ intact and symmetric  CHEST/LUNG: Clear to percussion bilaterally; No rales, rhonchi, wheezing, or rubs  HEART: Regular rate and rhythm; No murmurs, rubs, or gallops  ABDOMEN: Soft, Nontender, Nondistended; Bowel sounds present  EXTREMITIES:  2+ Peripheral Pulses, No clubbing, cyanosis, or edema  LYMPH: No lymphadenopathy noted  SKIN: No rashes or lesions  LABS:                        7.8    7.31  )-----------( 323      ( 04 Oct 2022 06:19 )             26.2     10-04    138  |  103  |  17  ----------------------------<  90  4.3   |  26  |  0.97    Ca    10.2      04 Oct 2022 06:19    TPro  7.0  /  Alb  2.6<L>  /  TBili  0.1<L>  /  DBili  x   /  AST  18  /  ALT  12  /  AlkPhos  81  10-04        CAPILLARY BLOOD GLUCOSE

## 2022-10-05 NOTE — PROGRESS NOTE ADULT - PROBLEM SELECTOR PLAN 9
24 hour discharge notice given  wants to stay today to monitor for adverse reaction.   Patient also c/o pain not being well managed. Started on Tramadol 50 mg Q 6 PRN.   SW to follow for safe discharge as patient endorses that she does not have anyone to care for her at home although she states that she lives with 2 family members.  Patient will need to follow up with her Oncologist and GYN Oncologist.    Pt refusing discharge, 24hr notice was given, pt appealed and was rejected. pt is appealing again
24 hour discharge notice given  wants to stay today to monitor for adverse reaction.   Patient also c/o pain not being well managed. Started on Tramadol 50 mg Q 6 PRN.   SW to follow for safe discharge as patient endorses that she does not have anyone to care for her at home although she states that she lives with 2 family members.  Patient will need to follow up with her Oncologist and GYN Oncologist.    Pt refusing discharge, 24hr notice was given, pt appealed and was rejected. pt is appealing again
From home.   Started on new ABX and wants to stay today to monitor for adverse reaction.   Patient also c/o pain not being well managed. Started on Tramadol 50 mg Q 6 PRN.   SW to follow for safe discharge as patient endorses that she does not have anyone to care for her at home although she states that she lives with 2 family members.  Patient will need to follow up with her Oncologist and GYN Oncologist.

## 2022-10-05 NOTE — PROGRESS NOTE ADULT - SUBJECTIVE AND OBJECTIVE BOX
Patient is a 56y old  Female who presents with a chief complaint of Chest Pain, UTI (04 Oct 2022 13:57)    INTERVAL HPI/OVERNIGHT EVENTS: no acute events onverngiht    REVIEW OF SYSTEMS:  CONSTITUTIONAL: No fever, chills  ENMT:  No difficulty hearing, no change in vision  NECK: No pain or stiffness  RESPIRATORY: No cough, SOB  CARDIOVASCULAR: No chest pain, palpitations  GASTROINTESTINAL: No abdominal pain. No nausea, vomiting, or diarrhea  GENITOURINARY: No dysuria  NEUROLOGICAL: No HA  SKIN: No itching, burning, rashes, or lesions   LYMPH NODES: No enlarged glands  ENDOCRINE: No heat or cold intolerance; No hair loss  MUSCULOSKELETAL: No joint pain or swelling; No muscle, back, or extremity pain  PSYCHIATRIC: No depression, anxiety  HEME/LYMPH: No easy bruising, or bleeding gums    T(C): 36.2 (10-05-22 @ 13:59), Max: 36.8 (10-04-22 @ 20:40)  HR: 72 (10-05-22 @ 13:59) (63 - 73)  BP: 103/56 (10-05-22 @ 13:59) (93/49 - 110/56)  RR: 16 (10-05-22 @ 13:59) (16 - 18)  SpO2: 100% (10-05-22 @ 13:59) (100% - 100%)  Wt(kg): --Vital Signs Last 24 Hrs  T(C): 36.2 (05 Oct 2022 13:59), Max: 36.8 (04 Oct 2022 20:40)  T(F): 97.2 (05 Oct 2022 13:59), Max: 98.2 (04 Oct 2022 20:40)  HR: 72 (05 Oct 2022 13:59) (63 - 73)  BP: 103/56 (05 Oct 2022 13:59) (93/49 - 110/56)  BP(mean): 65 (05 Oct 2022 13:59) (60 - 69)  RR: 16 (05 Oct 2022 13:59) (16 - 18)  SpO2: 100% (05 Oct 2022 13:59) (100% - 100%)    Parameters below as of 05 Oct 2022 13:59  Patient On (Oxygen Delivery Method): room air    MEDICATIONS  (STANDING):  enoxaparin Injectable 40 milliGRAM(s) SubCutaneous every 24 hours  ferrous    sulfate 325 milliGRAM(s) Oral three times a day  folic acid 1 milliGRAM(s) Oral daily  lactobacillus acidophilus 1 Tablet(s) Oral daily  multivitamin 1 Tablet(s) Oral daily  polyethylene glycol 3350 17 Gram(s) Oral two times a day  senna 2 Tablet(s) Oral at bedtime  traMADol 25 milliGRAM(s) Oral every 6 hours    MEDICATIONS  (PRN):  acetaminophen     Tablet .. 650 milliGRAM(s) Oral every 6 hours PRN Mild Pain (1 - 3)      PHYSICAL EXAM:  GENERAL: NAD  EYES: clear conjunctiva; EOMI  ENMT: Moist mucous membranes  NECK: Supple, No JVD, Normal thyroid  CHEST/LUNG: Clear to auscultation bilaterally; No rales, rhonchi, wheezing, or rubs  HEART: S1, S2, Regular rate and rhythm  ABDOMEN: Soft, Nontender, Nondistended; Bowel sounds present  NEURO: Alert & Oriented X3  EXTREMITIES: No LE edema, no calf tenderness  LYMPH: No lymphadenopathy noted  SKIN: No rashes or lesions  : R neph tube site with dressing CDI    Consultant(s) Notes Reviewed:  [x ] YES  [ ] NO  Care Discussed with Consultants/Other Providers [ x] YES  [ ] NO    LABS:                        7.8    7.31  )-----------( 323      ( 04 Oct 2022 06:19 )             26.2     10-04    138  |  103  |  17  ----------------------------<  90  4.3   |  26  |  0.97    Ca    10.2      04 Oct 2022 06:19    TPro  7.0  /  Alb  2.6<L>  /  TBili  0.1<L>  /  DBili  x   /  AST  18  /  ALT  12  /  AlkPhos  81  10-04      CAPILLARY BLOOD GLUCOSE    RADIOLOGY & ADDITIONAL TESTS:    Imaging Personally Reviewed:  [x] YES  [ ] NO  < from: Xray Chest 1 View- PORTABLE-Routine (Xray Chest 1 View- PORTABLE-Routine in AM.) (10.03.22 @ 14:47) >  ACC: [id number] EXAM:  XR CHEST PORTABLE ROUTINE 1V                          PROCEDURE DATE:  10/03/2022          INTERPRETATION:  AP erect chest on October 13, 2022 at 12:36 PM. Patient   has cough and is Covid positive.    Heart size is within normal limits.    Left-sided pacemaker again noted.    Multiple pulmonary metastases are again noted.    No infiltrate.    Metastatic areas have increased somewhat in number compared to August 16.    IMPRESSION: Pulmonary metastases somewhat increased in number.    --- End of Report ---            ERIC LYNN MD; Attending Radiologist  This document has been electronically signed. Oct  3 2022  2:50PM    < end of copied text >  < from: CT Abdomen and Pelvis No Cont (09.20.22 @ 01:34) >    ACC: [id number] EXAM:  CT ABDOMEN AND PELVIS                        ACC: [id number] EXAM:  CT CHEST                          PROCEDURE DATE:  09/20/2022          INTERPRETATION:  CLINICAL INFORMATION: Chest pain. Right nephrostomy   tube, hematuria.    COMPARISON: CT chest, abdomen and pelvis 7/31/2022, abdominal sonogram   8/18/2022    CONTRAST/COMPLICATIONS:  IV Contrast: None  Oral Contrast: None  Complications: None    PROCEDURE:  CT of the Chest, Abdomen and Pelvis was performed without intravenous   contrast.  Intravenous contrast: None.  Oral contrast: positive contrast was administered.  Sagittal and coronal reformats were performed.    FINDINGS:    CHEST:    LUNGS AND LARGE AIRWAYS: Patent central airways. Bilateral pulmonary   noduleshave again increased in size and number since the recent prior   chest CT. For example, left lower lobe nodule now measures 2.8 x 2.0 cm   and has cavitation (prior 2.5 x 1.9 cm). A right lower lobe nodule now   measures 2.5 x 2.0 cm (prior 2.0 x 1.8cm) with interval resolution of   cavitation within this nodule. Adjacent nodule now appears cavitary.  PLEURA: No pleural effusion. No pneumothorax.  VESSELS: Within normal limits.  HEART: Heart size is normal. Trace pericardial effusion. Distal leads of   cardiac device overlying the right atrium and ventricle, unchanged.  MEDIASTINUM AND FLORENTIN: No lymphadenopathy.  CHEST WALL AND LOWER NECK: Left chest wall dual-lead cardiac device.    ABDOMEN AND PELVIS:    LIVER: 2.4 cm hypodense lesion in the right hepatic lobe not   significantly changed from the prior exam. There are several new   hypodense lesions within the left and right hepatic lobes as for example   the left hepatic lobe on series 4 image 114 measures 2 cm and in the   right hepatic lobe 0.9 cm on series 4 image 134.  BILE DUCTS: Normal caliber.  GALLBLADDER: Within normal limits.  SPLEEN: Within normal limits.  PANCREAS: Within normal limits.  ADRENALS: Within normal limits.  KIDNEYS/URETERS: Right nephrostomy tube in place. No hydronephrosis or   perinephric stranding on the right. Severe left hydronephrosis and   scattered calcifications, unchanged. Minimal left perinephric stranding   similar to the prior exam.    BLADDER: Marked bladder wall thickening similar to the prior exam.  REPRODUCTIVE ORGANS: Uterus and adnexal structures similar to prior exam.    BOWEL: No bowel obstruction. Appendix is normal. No bowel wall thickening   or pericolonic inflammatory change. Underdistended stomach which limits   evaluation of the wall thickness.  PERITONEUM: No ascites. No free air.  VESSELS:  Within normal limits.  RETROPERITONEUM: Retroperitoneal adenopathy is not significantly changed.   Left periaortic lymph node measures 2.5 cm  ABDOMINAL WALL: Within normal limits.  BONES: No aggressive osteoblastic or lytic lesion.    IMPRESSION:  Chest CT: Interval progression of cavitary pulmonary metastatic disease   with increased size and number of pulmonary nodules. No evidence of   pneumonia.    CT abdomen/pelvis: Right nephrostomy tube in place without   hydronephrosis. Stable superior left hydronephrosis. Marked irregular   bladder wall thickening, unchanged.  Progression of liver metastasis. Stable retroperitoneal adenopathy.    --- End of Report ---            SARAI DUGGAN MD; Attending Radiologist  This document has been electronically signed. Sep 20 2022  3:17AM    < end of copied text >

## 2022-10-05 NOTE — PROGRESS NOTE ADULT - PROBLEM SELECTOR PLAN 7
Lovenox for DVT ppx  Monitor CBC
Lovenox for DVT ppx  Monitor CBC
Intermittent vaginal bleeding endorsed but none at this time.   Seen by GYN on 08/22  and at that time Pt deferred pelvic exam and pelvic sonogram as she was worked up on previous admission and recommended to follow up outpatient at [facility]. No acute interventions at that time.   Pt endorses that she wants to address with her oncologist for pelvic exam and for transvaginal US. Encouraged her to see a GYN oncologist and referral given.   H&H stable.   Monitor CBC daily.
Lovenox for DVT ppx  Monitor CBC
Intermittent vaginal bleeding endorsed but none at this time.   Seen by GYN on 08/22  and at that time Pt deferred pelvic exam and pelvic sonogram as she was worked up on previous admission and recommended to follow up outpatient at [facility]. No acute interventions at that time.   Pt endorses that she wants to address with her oncologist for pelvic exam and for transvaginal US. Encouraged her to see a GYN oncologist and referral given.   H&H stable.   Monitor CBC daily.
Intermittent vaginal bleeding endorsed but none at this time.   Seen by GYN on 08/22  and at that time Pt deferred pelvic exam and pelvic sonogram as she was worked up on previous admission and recommended to follow up outpatient at [facility]. No acute interventions at that time.   Pt endorses that she wants to address with her oncologist for pelvic exam and for transvaginal US. Encouraged her to see a GYN oncologist and referral given.   H&H stable.   Monitor CBC daily.
Patient from home   Will likely return home without needs
Lovenox for DVT ppx  Monitor CBC
Lovenox for DVT ppx  Monitor CBC
patient from home   will likely return home without needed   remains on IV ABx   pending UCX

## 2022-10-05 NOTE — PROGRESS NOTE ADULT - ASSESSMENT
seen and examined vsstable afebrile on bed   PO on Ra 97 percent  denies any complaints  vaginal bleeding much better   denies cp or sob or palp  no abd pain   labs hgb 7.8   a/p pt is covid pos but asymptomatic   hgb stable on feso4  fa  DC home     f/u pcp cbc, bmp in 3 days  watch hgb   DC  HOME  OUT PT CARDIOLOGY

## 2022-10-06 NOTE — PROGRESS NOTE ADULT - PROBLEM SELECTOR PLAN 2
with metastasis to liver and lungs confirmed by CT  -Last chemo 5/20/22  -Plan to cont tx at [facility] with Dr. Hoyt  -Pain mgnt with Tramadol

## 2022-10-06 NOTE — PROGRESS NOTE ADULT - PROBLEM SELECTOR PLAN 1
complicated  -a/w hematuria in nephrostomy bag  -Has Hx of multi drug resistant UTIs  -UCX positive for enterococcus   -Completed Abx 9/30  -Afebrille

## 2022-10-06 NOTE — PROGRESS NOTE ADULT - PROBLEM SELECTOR PLAN 3
chronic right nephrostomy tube exchanged 8/22  -CT A/P shows Stable superior left hydronephrosis. Marked irregular bladder wall thickening, unchanged.  -f/u OP hem/onc

## 2022-10-06 NOTE — PROGRESS NOTE ADULT - ASSESSMENT
( Note written / Date of service 10-06-22 )    ==================>> MEDICATIONS <<====================    enoxaparin Injectable 40 milliGRAM(s) SubCutaneous every 24 hours  ferrous    sulfate 325 milliGRAM(s) Oral three times a day  folic acid 1 milliGRAM(s) Oral daily  lactobacillus acidophilus 1 Tablet(s) Oral daily  multivitamin 1 Tablet(s) Oral daily  polyethylene glycol 3350 17 Gram(s) Oral two times a day  senna 2 Tablet(s) Oral at bedtime  traMADol 25 milliGRAM(s) Oral every 6 hours    MEDICATIONS  (PRN):  acetaminophen     Tablet .. 650 milliGRAM(s) Oral every 6 hours PRN Mild Pain (1 - 3)    ___________  Active diet:  Diet, Regular  ___________________    ==================>> VITAL SIGNS <<==================    Vital Signs Last 24 HrsT(C): 36.9 (10-06-22 @ 13:46)  T(F): 98.4 (10-06-22 @ 13:46), Max: 98.8 (10-05-22 @ 20:53)  HR: 73 (10-06-22 @ 13:46) (65 - 73)  BP: 94/55 (10-06-22 @ 13:46)  RR: 16 (10-06-22 @ 13:46) (16 - 16)  SpO2: 100% (10-06-22 @ 13:46) (100% - 100%)      CAPILLARY BLOOD GLUCOSE         ==================>> LAB AND IMAGING <<==================             WBC count:   7.31 <<== ,  6.21 <<== ,  7.42 <<==   Hemoglobin:   7.8 <<==,  7.7 <<==,  7.3 <<==  platelets:  323 <==, 259 <==, 254 <==, 273 <==, 241 <==    Creatinine:  0.97  <<==, 0.85  <<==, 1.00  <<==, 0.86  <<==  Sodium:   138  <==, 138  <==, 136  <==, 138  <==       AST:          18 <== , 20 <==      ALT:        12  <== , 13  <==      AP:        81  <=, 84  <=     Bili:        0.1  <=, 0.1  <=    ____________________________    M I C R O B I O L O G Y :      COVID-19 PCR: Detected (10-04-22 @ 18:00)  COVID-19 PCR: Detected (09-26-22 @ 16:05)  COVID-19 PCR: NotDetec (09-22-22 @ 13:40)     _________________________________________________________________________________________  ========>>  M E D I C A L   A T T E N D I N G    F O L L O W  U P  N O T E  <<=========  -----------------------------------------------------------------------------------------------------    - Patient seen and examined by me earlier today.   - In summary,  JEF HOUSE is a 56y year old woman admitted with UTI  - Patient today overall doing ok, comfortable, eating fairly , pain overall controlled      vaginal bleeding on and off    pt concerned / decliing to go home and disputed discharge due to the fact that she has an immunocompromised daughter at home :: I explained to pt more than one time that since pt not symptomatic, not febrile and already more than 10 days quarantined pt is no danger to her daughter     ==================>> REVIEW OF SYSTEM <<=================    GEN: no fever, no chills, as above , otherwise in good spirits   RESP: no SOB, as above , off O2   CVS: chest pain as above ( related to left flank pain) , no palpitations  GI: no abdominal pain, no nausea, no more diarrhea reported   : no dysuria, no frequency.. as above , no blood , as above   Neuro: no headache, no dizziness  Derm : no itching, no rash    ==================>> PHYSICAL EXAM <<=================    GEN: A&O X 3 , NAD , comfortable, pleasant, calm , cachectic , weak appearing   HEENT: NCAT, PERRL, MMM, hearing intact  Neck: supple , no JVD appreciated  CVS: S1S2 , regular , No M/R/G appreciated  PULM: CTA B/L,  no W/R/R appreciated  ABD.: soft. non tender, non distended,  bowel sounds present  Extrem: intact pulses , no edema      nephrostomy bag / leg bag draining clear yellow urine   PSYCH : normal mood,  not anxious                             ( Note written / Date of service 10-06-22 )    ==================>> MEDICATIONS <<====================    enoxaparin Injectable 40 milliGRAM(s) SubCutaneous every 24 hours  ferrous    sulfate 325 milliGRAM(s) Oral three times a day  folic acid 1 milliGRAM(s) Oral daily  lactobacillus acidophilus 1 Tablet(s) Oral daily  multivitamin 1 Tablet(s) Oral daily  polyethylene glycol 3350 17 Gram(s) Oral two times a day  senna 2 Tablet(s) Oral at bedtime  traMADol 25 milliGRAM(s) Oral every 6 hours    MEDICATIONS  (PRN):  acetaminophen     Tablet .. 650 milliGRAM(s) Oral every 6 hours PRN Mild Pain (1 - 3)    ___________  Active diet:  Diet, Regular  ___________________    ==================>> VITAL SIGNS <<==================    Vital Signs Last 24 HrsT(C): 36.9 (10-06-22 @ 13:46)  T(F): 98.4 (10-06-22 @ 13:46), Max: 98.8 (10-05-22 @ 20:53)  HR: 73 (10-06-22 @ 13:46) (65 - 73)  BP: 94/55 (10-06-22 @ 13:46)  RR: 16 (10-06-22 @ 13:46) (16 - 16)  SpO2: 100% (10-06-22 @ 13:46) (100% - 100%)         ==================>> LAB AND IMAGING <<==================       no labs today      WBC count:   7.31 <<== ,  6.21 <<== ,  7.42 <<==   Hemoglobin:   7.8 <<==,  7.7 <<==,  7.3 <<==  platelets:  323 <==, 259 <==, 254 <==, 273 <==, 241 <==    Creatinine:  0.97  <<==, 0.85  <<==, 1.00  <<==, 0.86  <<==  Sodium:   138  <==, 138  <==, 136  <==, 138  <==       AST:          18 <== , 20 <==      ALT:        12  <== , 13  <==      AP:        81  <=, 84  <=     Bili:        0.1  <=, 0.1  <=    ____________________________    M I C R O B I O L O G Y :      COVID-19 PCR: Detected (10-04-22 @ 18:00)  COVID-19 PCR: Detected (09-26-22 @ 16:05)  COVID-19 PCR: NotDetec (09-22-22 @ 13:40)           10-04-22 @ 06:19  Iron:     14 ug/dL  Iron %:   6 %  TIBC:     228 ug/dL       10-04-22 @ 06:19  Vit B12:  >2000 pg/mL  Folate:   7.6 ng/mL  _________________________________________________________________________________  ===============>>  A S S E S S M E N T   A N D   P L A N <<===============  ------------------------------------------------------------------------------------------    · Assessment	  56 year old female from home, with PMHX of Bladder cancer (previously on chemo until 5/2022) with  Dr. Renay [facility], chronic hydronephrosis  R nephrostomy tube, hyperparathyroidism, bradycardia with PPM coming in for hematuria. Of note patient with hx multidrug resistant UTI, recent nephrostomy tube exchange 8/22/22.      Problem/Plan - 1:  ·  Problem: Complicated UTI (urinary tract infection).   ·  Plan: P/W Hematuria in nephrostomy drainage bag   hx of multi drug resistant UTIs   finish Oral Abx per ID plan  ID Rodriguez following  pain mgmt as above     *** + covid - 19 swab    pt with minimal symptoms, rare cough , off O2       monitor        supportive care       nutrition / hydration, IS, vitamins        no need for isolation     Problem/Plan - 2:  ·  Problem: Metastatic cancer.   ·  Plan: metastatic bladder CA   CT confirms progression of disease with mets to lung and liver   last chemo 05/2022  plan to continue treatment once discharged.  f/u onc as OP    ***  vaginal bleed    appears to be related to her malignancy / mets..     pt evaluated by GYN last admission but declined Pelvic exam and sono     bleeding has lessened : monitor : OP follow up     monitor H.H     pt declining blood transfusion     ** Anemia    Iron deficiency    Folate deficiency  >> supplements as ordered  monitor     Problem/Plan - 3:  ·  Problem: Atypical chest pain.  ·  Plan: c/p flank pain that radiates to chest   monitor      Problem/Plan - 4:  ·  Problem: Hydronephrosis.  ·  Plan: s/p R nephrostomy tube exchange 08/2022  chronic stable left hydro   monitor urine output   trend BMP.     Problem/Plan - 5:  ·  Problem: Prophylactic measure.   ·  Plan: Lovenox for DVT ppx.    Discharge planning issues as above , administration is involved as well and helping in this matter     --------------------------------------------  Case discussed with pt, NP  Education given on findings and plan of care  ___________________________  H. NIEVES Ochoa.   Pager: [phone]      .

## 2022-10-06 NOTE — PROGRESS NOTE ADULT - PROBLEM SELECTOR PLAN 5
24 hour discharge notice given  wants to stay today to monitor for adverse reaction.   Patient also c/o pain not being well managed. Started on Tramadol 50 mg Q 6 PRN.   SW to follow for safe discharge as patient endorses that she does not have anyone to care for her at home although she states that she lives with 2 family members.  Patient will need to follow up with her Oncologist and GYN Oncologist.    Pt refusing discharge, 24hr notice was given, pt appealed and was rejected. pt is appealing again  10/6-Still refusing discharge stating she can not discharge while testing COVID+

## 2022-10-06 NOTE — PROGRESS NOTE ADULT - ASSESSMENT
meka nd examined  covering for Dr Ochoa   on bed  mildly depressed but no thoughts of suicide or harm to others  c/o mild  abd pain  no nausea or vomiting  had rectal suppos  had 1 bm today   mild lower abd pain  no dysurea   vs stable physical done  lungs clear abd soft bs nml nt nd   cns awake nf  labs noted  hgb 8.5   creat 1.06  a/p recurrent uti possibly sec to nephrostomy tube second bladder ca   came with UTI  now better  was on zosyn  now on amoxicilln as per id   pt wnts lower dose of amoxicillin 250 q8  i d/w ID she is ok   extra water   avoid constipation   D/C planning    Pt. is medically stable for discharge, declines discharge stating she needs to test negative for COVID before she leaves hospital.  Explained to pt. that she might test positive for a long time and is safe to discharge to home.  Pt. converted 9/26, last COVID 10/4+.  Will repeat COVID PCR in am.

## 2022-10-06 NOTE — PROGRESS NOTE ADULT - SUBJECTIVE AND OBJECTIVE BOX
NP Note discussed with  Primary Attending    Patient is a 56y old  Female who presents with a chief complaint of Chest Pain, UTI (05 Oct 2022 17:47)      INTERVAL HPI/OVERNIGHT EVENTS: no new complaints    MEDICATIONS  (STANDING):  enoxaparin Injectable 40 milliGRAM(s) SubCutaneous every 24 hours  ferrous    sulfate 325 milliGRAM(s) Oral three times a day  folic acid 1 milliGRAM(s) Oral daily  lactobacillus acidophilus 1 Tablet(s) Oral daily  multivitamin 1 Tablet(s) Oral daily  polyethylene glycol 3350 17 Gram(s) Oral two times a day  senna 2 Tablet(s) Oral at bedtime  traMADol 25 milliGRAM(s) Oral every 6 hours    MEDICATIONS  (PRN):  acetaminophen     Tablet .. 650 milliGRAM(s) Oral every 6 hours PRN Mild Pain (1 - 3)      __________________________________________________  REVIEW OF SYSTEMS:    CONSTITUTIONAL: No fever,   EYES: no acute visual disturbances  NECK: No pain or stiffness  RESPIRATORY: No cough; No shortness of breath  CARDIOVASCULAR: No chest pain, no palpitations  GASTROINTESTINAL: No pain. No nausea or vomiting; No diarrhea   NEUROLOGICAL: No headache or numbness, no tremors  MUSCULOSKELETAL: No joint pain, no muscle pain  GENITOURINARY: no dysuria, no frequency, no hesitancy  PSYCHIATRY: no depression , no anxiety  ALL OTHER  ROS negative        Vital Signs Last 24 Hrs  T(C): 36.7 (06 Oct 2022 05:18), Max: 37.1 (05 Oct 2022 20:53)  T(F): 98 (06 Oct 2022 05:18), Max: 98.8 (05 Oct 2022 20:53)  HR: 65 (06 Oct 2022 05:18) (65 - 72)  BP: 95/51 (06 Oct 2022 05:18) (95/51 - 103/56)  BP(mean): 62 (06 Oct 2022 05:18) (62 - 65)  RR: 16 (06 Oct 2022 05:18) (16 - 16)  SpO2: 100% (06 Oct 2022 05:18) (100% - 100%)    Parameters below as of 06 Oct 2022 05:18  Patient On (Oxygen Delivery Method): room air        ________________________________________________  PHYSICAL EXAM:  GENERAL: NAD  HEENT: Normocephalic;  conjunctivae and sclerae clear; moist mucous membranes;   NECK : supple  CHEST/LUNG: Clear to auscultation bilaterally with good air entry   HEART: S1 S2  regular; no murmurs, gallops or rubs  ABDOMEN: Soft, Nontender, Nondistended; Bowel sounds present  EXTREMITIES: no cyanosis; no edema; no calf tenderness  SKIN: warm and dry; no rash  NERVOUS SYSTEM:  Awake and alert; Oriented  to place, person and time ; no new deficits    _________________________________________________  LABS:      CAPILLARY BLOOD GLUCOSE    RADIOLOGY & ADDITIONAL TESTS:    < from: Xray Chest 1 View- PORTABLE-Routine (Xray Chest 1 View- PORTABLE-Routine in AM.) (10.03.22 @ 14:47) >    ACC: [id number] EXAM:  XR CHEST PORTABLE ROUTINE 1V                          PROCEDURE DATE:  10/03/2022          INTERPRETATION:  AP erect chest on October 13, 2022 at 12:36 PM. Patient   has cough and is Covid positive.    Heart size is within normal limits.    Left-sided pacemaker again noted.    Multiple pulmonary metastases are again noted.    No infiltrate.    Metastatic areas have increased somewhat in number compared to August 16.    IMPRESSION: Pulmonary metastases somewhat increased in number.    --- End of Report ---    < end of copied text >    < from: CT Abdomen and Pelvis No Cont (09.20.22 @ 01:34) >    ACC: [id number] EXAM:  CT ABDOMEN AND PELVIS                        ACC: [id number] EXAM:  CT CHEST                          PROCEDURE DATE:  09/20/2022          INTERPRETATION:  CLINICAL INFORMATION: Chest pain. Right nephrostomy   tube, hematuria.    COMPARISON: CT chest, abdomen and pelvis 7/31/2022, abdominal sonogram   8/18/2022    CONTRAST/COMPLICATIONS:  IV Contrast: None  Oral Contrast: None  Complications: None    PROCEDURE:  CT of the Chest, Abdomen and Pelvis was performed without intravenous   contrast.  Intravenous contrast: None.  Oral contrast: positive contrast was administered.  Sagittal and coronal reformats were performed.    FINDINGS:    CHEST:    LUNGS AND LARGE AIRWAYS: Patent central airways. Bilateral pulmonary   noduleshave again increased in size and number since the recent prior   chest CT. For example, left lower lobe nodule now measures 2.8 x 2.0 cm   and has cavitation (prior 2.5 x 1.9 cm). A right lower lobe nodule now   measures 2.5 x 2.0 cm (prior 2.0 x 1.8cm) with interval resolution of   cavitation within this nodule. Adjacent nodule now appears cavitary.  PLEURA: No pleural effusion. No pneumothorax.  VESSELS: Within normal limits.  HEART: Heart size is normal. Trace pericardial effusion. Distal leads of   cardiac device overlying the right atrium and ventricle, unchanged.  MEDIASTINUM AND FLORENTIN: No lymphadenopathy.  CHEST WALL AND LOWER NECK: Left chest wall dual-lead cardiac device.    ABDOMEN AND PELVIS:    LIVER: 2.4 cm hypodense lesion in the right hepatic lobe not   significantly changed from the prior exam. There are several new   hypodense lesions within the left and right hepatic lobes as for example   the left hepatic lobe on series 4 image 114 measures 2 cm and in the   right hepatic lobe 0.9 cm on series 4 image 134.  BILE DUCTS: Normal caliber.  GALLBLADDER: Within normal limits.  SPLEEN: Within normal limits.  PANCREAS: Within normal limits.  ADRENALS: Within normal limits.  KIDNEYS/URETERS: Right nephrostomy tube in place. No hydronephrosis or   perinephric stranding on the right. Severe left hydronephrosis and   scattered calcifications, unchanged. Minimal left perinephric stranding   similar to the prior exam.    BLADDER: Marked bladder wall thickening similar to the prior exam.  REPRODUCTIVE ORGANS: Uterus and adnexal structures similar to prior exam.    BOWEL: No bowel obstruction. Appendix is normal. No bowel wall thickening   or pericolonic inflammatory change. Underdistended stomach which limits   evaluation of the wall thickness.  PERITONEUM: No ascites. No free air.  VESSELS:  Within normal limits.  RETROPERITONEUM: Retroperitoneal adenopathy is not significantly changed.   Left periaortic lymph node measures 2.5 cm  ABDOMINAL WALL: Within normal limits.  BONES: No aggressive osteoblastic or lytic lesion.    IMPRESSION:  Chest CT: Interval progression of cavitary pulmonary metastatic disease   with increased size and number of pulmonary nodules. No evidence of   pneumonia.    CT abdomen/pelvis: Right nephrostomy tube in place without   hydronephrosis. Stable superior left hydronephrosis. Marked irregular   bladder wall thickening, unchanged.  Progression of liver metastasis. Stable retroperitoneal adenopathy.    --- End of Report ---    < end of copied text >      Imaging Personally Reviewed:  YES/NO    Consultant(s) Notes Reviewed:   YES/ No    Care Discussed with Consultants :     Plan of care was discussed with patient and /or primary care giver; all questions and concerns were addressed and care was aligned with patient's wishes.

## 2022-10-06 NOTE — PROGRESS NOTE ADULT - PROVIDER SPECIALTY LIST ADULT
ITP ASSESSMENT   Assessment Day: Initial  Session Number: 1  Precautions: Falls, S/P MI, EF 35-40%  Diagnosis: MI;Stent  Risk Stratification: High (EF% 35-40)  Referring Provider: Shelia Garcia MD  EXERCISE  Exercise Assessment: Initial  Pt able to exercise 10 minutes on the Nustep at 2.3 METS. Pt is limited by HARDING while walking and Osteoarthritis knee pain.                        Exercise Plan  Goals Next 30 days  ADL'S: Resume Sweeping, Grocery Shop  Leisure: Walk 5-10 minutes 3-4x/week,  Work: Attend [employer] in [place]      Education Goals: Has system for taking medication.;Medication review  Education Goals Met: Patient can state cardiac s/s and appropriate emergency response.;Has system for taking medication.    Exercise Prescription  Exercise Mode: Bike;Nustep;Arm Erg.;Treadmill  Frequency: 2x/week  Duration: 30-40 minutes  Intensity / THR: 20-30 beats above resting heart rate  RPE 11-14  Progression / Met level: 2.5-2.6  Resistive Training?: No    Current Exercise (mins/week): 10    Interventions  Home Exercise:  Mode: Walking  Frequency: 5-10 minutes  Duration: 3-4 days/week    Education Material : Educational videos;Provide written material;Individual education and counseling;Offer educational classes    Education Completed  Exercise Education Completed: Cardiac Anatomy;Signs and Symptoms;Medication review;RPE;Emergency Plan;Home Exercise;Warm up/cool down;FITT Principles;BP/HR Reponse to exercise;Benefits of Exercise            Exercise Follow-up/Discharge  Follow up/Discharge: Encouraged pt to return to home walking program NUTRITION  Nutrition Assessment: Initial    Nutrition Risk Factors:  Nutrition Risk Factors: Dyslipidemia  Cholesterol: 157  LDL: 91  HDL: 41  Triglycerides: 124    Nutrition Plan  Interventions  Nutrition Interventions: Diet consult;Diet class;Therapist/Patient discussion;Educational videos;Provide with written material      Education Completed  Nutrition Education  Internal Medicine "Completed: Risk factor overview    Goals  Nutrition Goals (Next 30 days): Patient can identify their risk factors for CAD;Patient will follow a low sodium diet;Patient will follow a low saturated fat diet;Patient knows appropriate portion size    Goals Met  Nutrition Goals Met: Patient can identify their risk factors for CAD    Height, Weight, and  BMI  Weight: 142 lb (64.4 kg)  Height: 5' 3\" (1.6 m)  BMI: 25.16    Nutrition Follow-up  Follow-up/Discharge: Encouraged pts to Eat more meals at home.       Other Risk Factors  Other Risk Factor Assessment: Initial    HTN Risk Factor: Hypertension    Pre Exercise BP: 123/58  Post Exercise BP: 129/56    Hypertension Plan  Goals  HTN Goals: Follow low sodium diet;Exercises regularly;Take medication as prescribed    Goals Met  HTN Goals Met: Take medication as prescribed    HTN Interventions  HTN Interventions: Diet consult;Therapist/patient discussion;Offer educational videos;Provide written material;Offer educational classes    HTN Education Completed  HTN Education Completed: Risk factor overview    Tobacco Risk Factor: NA    Risk Factor Follow-up   Follow-up/Discharge: Encouraged pt to meet with dietician   PSYCHOSOCIAL  Psychosocial Assessment: Initial     [place] [employer] Q of L Summary Score: 29      Psychosocial Risk Factor: Stress    Psychosocial Plan  Interventions  If JOVANI-D > 15 send letter to MD  Interventions: Offer Spiritual Care consult;Offer educational videos and classes;Provide written material;Individual education and counseling    Education Completed  Education Completed: Effects of stress on body    Goals  Goals (Next 30 days): Identified Support system;Identify stressors;Practicing stress management skills;Improvement in [place] [employer] score    Goals Met  Goals Met: Identified Support system;Identify stressors;Oriented to stress management classes    Psychosocial Follow-up  Follow-up/Discharge: New CAD diagnosis stressful           Patient involved in " Goal setting?: Yes    Signature: _____________________________________________________________    Date: __________________    Time: __________________

## 2022-10-07 NOTE — PROGRESS NOTE ADULT - PROBLEM SELECTOR PLAN 5
24 hour discharge notice given  wants to stay today to monitor for adverse reaction.   Patient also c/o pain not being well managed. Started on Tramadol 50 mg Q 6 PRN.   SW to follow for safe discharge as patient endorses that she does not have anyone to care for her at home although she states that she lives with 2 family members.  Patient will need to follow up with her Oncologist and GYN Oncologist.    Pt refusing discharge, 24hr notice was given, pt appealed and was rejected. pt is appealing again  10/6-Still refusing discharge stating she can not discharge while testing COVID+ - Patient will need to follow up with her Oncologist and GYN Oncologist.  - Pt refusing discharge, 24hr notice was given, pt appealed twice and was rejected twice. pt is appealing again  10/6-Still refusing discharge stating she can not discharge while testing COVID+ DVT ppx: Lovenox

## 2022-10-07 NOTE — PROGRESS NOTE ADULT - PROBLEM SELECTOR PLAN 4
Lovenox for DVT ppx  Monitor CBC - Intermittent vaginal bleeding endorsed  - Seen by GYN on 08/22  and at that time Pt deferred pelvic exam and pelvic sonogram as she was worked up on previous admission and recommended to follow up outpatient at [facility]. No acute interventions at that time.   - Pt endorses that she wants to address with her oncologist for pelvic exam and for transvaginal US. Encouraged her to see a GYN oncologist and referral given.   - H&H stable.   - trend CBC

## 2022-10-07 NOTE — PROGRESS NOTE ADULT - ASSESSMENT
_________________________________________________________________________________________  ========>>  M E D I C A L   A T T E N D I N G    F O L L O W  U P  N O T E  <<=========  -----------------------------------------------------------------------------------------------------    - Patient seen and examined by me earlier today.   - In summary,  JEF HOUSE is a 56y year old woman admitted with UTI  - Patient today overall doing ok, comfortable, eating fairly , pain overall controlled      vaginal bleeding on and off       feels depressed today > pt reassured     pt concerned / decliing to go home and disputed discharge due to the fact that she has an immunocompromised daughter at home :: I explained to pt several times that since pt not symptomatic, not febrile and already more than 10 days quarantined pt is no danger to her daughter pt appealing discharge, wants to go home when tests turn negative      ==================>> REVIEW OF SYSTEM <<=================    GEN: no fever, no chills, as above  RESP: no SOB, as above , off O2   CVS: chest pain as above ( related to left flank pain) , no palpitations  GI: no abdominal pain, no nausea, no more diarrhea reported    : no dysuria, no frequency.. as above , no blood , as above   Neuro: no headache, no dizziness  Derm : no itching, no rash    ==================>> PHYSICAL EXAM <<=================    GEN: A&O X 3 , NAD , comfortable, pleasant, calm , cachectic , weak appearing   HEENT: NCAT, PERRL, MMM, hearing intact  Neck: supple , no JVD appreciated  CVS: S1S2 , regular , No M/R/G appreciated  PULM: CTA B/L,  no W/R/R appreciated  ABD.: soft. non tender, non distended,  bowel sounds present  Extrem: intact pulses , no edema      nephrostomy bag / leg bag draining clear yellow urine   PSYCH : normal mood,  not anxious                             ( note written / Date of service   10-07-22 )    ==================>> MEDICATIONS <<====================    enoxaparin Injectable 40 milliGRAM(s) SubCutaneous every 24 hours  ferrous    sulfate 325 milliGRAM(s) Oral three times a day  folic acid 1 milliGRAM(s) Oral daily  multivitamin 1 Tablet(s) Oral daily  polyethylene glycol 3350 17 Gram(s) Oral two times a day  senna 2 Tablet(s) Oral at bedtime  traMADol 25 milliGRAM(s) Oral every 6 hours    MEDICATIONS  (PRN):  acetaminophen     Tablet .. 650 milliGRAM(s) Oral every 6 hours PRN Mild Pain (1 - 3)    ___________  Active diet:  Diet, Regular  ___________________    ==================>> VITAL SIGNS <<==================     Vital Signs Last 24 HrsT(C): 36.4 (10-07-22 @ 13:23)  T(F): 97.6 (10-07-22 @ 13:23), Max: 98 (10-06-22 @ 20:11)  HR: 79 (10-07-22 @ 13:23) (65 - 79)  BP: 102/59 (10-07-22 @ 13:23)  RR: 16 (10-07-22 @ 13:23) (16 - 16)  SpO2: 99% (10-07-22 @ 13:23) (99% - 100%)       ==================>> LAB AND IMAGING <<==================       no labs today          10-04-22 @ 06:19  Iron:     14 ug/dL  Iron %:   6 %  TIBC:     228 ug/dL       10-04-22 @ 06:19  Vit B12:  >2000 pg/mL  Folate:   7.6 ng/mL  _________________________________________________________________________________  ===============>>  A S S E S S M E N T   A N D   P L A N <<===============  ------------------------------------------------------------------------------------------    · Assessment	  56 year old female from home, with PMHX of Bladder cancer (previously on chemo until 5/2022) with  Dr. Renay Trinidad, chronic hydronephrosis  R nephrostomy tube, hyperparathyroidism, bradycardia with PPM coming in for hematuria. Of note patient with hx multidrug resistant UTI, recent nephrostomy tube exchange 8/22/22.      Problem/Plan - 1:  ·  Problem: Complicated UTI (urinary tract infection).   ·  Plan: P/W Hematuria in nephrostomy drainage bag   hx of multi drug resistant UTIs   finish Oral Abx per ID plan  ID Rodriguez following  pain mgmt as above     *** + covid - 19 swab    pt with minimal symptoms, rare cough , off O2       monitor        supportive care       nutrition / hydration, IS, vitamins        no need for isolation     Problem/Plan - 2:  ·  Problem: Metastatic cancer.   ·  Plan: metastatic bladder CA   CT confirms progression of disease with mets to lung and liver   last chemo 05/2022  plan to continue treatment once discharged.  f/u onc as OP    ***  vaginal bleed    appears to be related to her malignancy / mets..     pt evaluated by GYN last admission but declined Pelvic exam and sono     bleeding has lessened : monitor : OP follow up     monitor H.H     pt declining blood transfusion     ** Anemia    Iron deficiency    Folate deficiency  >> supplements as ordered  monitor     Problem/Plan - 3:  ·  Problem: Atypical chest pain.  ·  Plan: c/p flank pain that radiates to chest   monitor      Problem/Plan - 4:  ·  Problem: Hydronephrosis.  ·  Plan: s/p R nephrostomy tube exchange 08/2022  chronic stable left hydro   monitor urine output   trend BMP.     Problem/Plan - 5:  ·  Problem: Prophylactic measure.   ·  Plan: Lovenox for DVT ppx.    Discharge planning issues as above , administration is involved as well and helping in this matter     --------------------------------------------  Case discussed with pt, med team   Education given on findings and plan of care  ___________________________  H. NIEVES Ochoa.   Pager: [phone]    Dr Tobin will cover me 10/8/22 - 10/11/22 .      .

## 2022-10-07 NOTE — PROGRESS NOTE ADULT - PROBLEM SELECTOR PLAN 6
- Patient will need to follow up with her Oncologist and GYN Oncologist.  - Pt refusing discharge, 24hr notice was given, pt appealed x2 and was rejected x2  10/6-Still refusing discharge stating she can not discharge while testing COVID+  10/7- patient approached by CM and states she will be appealing again

## 2022-10-07 NOTE — PROGRESS NOTE ADULT - PROBLEM SELECTOR PLAN 2
with metastasis to liver and lungs confirmed by CT  -Last chemo 5/20/22  -Plan to cont tx at [place] with Dr. Hoyt  -Pain mgnt with Tramadol

## 2022-10-07 NOTE — PROGRESS NOTE ADULT - PROBLEM SELECTOR PLAN 1
complicated  -a/w hematuria in nephrostomy bag  -Has Hx of multi drug resistant UTIs  -UCX positive for enterococcus   -Completed Abx 9/30  -Afebrille - resolved  - complicated  -a/w hematuria in nephrostomy bag  - Has Hx of multi drug resistant UTIs  -UCX positive for enterococcus   - Completed Abx 9/30  - Afebrile - resolved  - complicated  - a/w hematuria in nephrostomy bag  - Has Hx of multi drug resistant UTIs  -UCX positive for enterococcus   - Completed Abx 9/30  - Afebrile

## 2022-10-07 NOTE — PROGRESS NOTE ADULT - ASSESSMENT
meka nd examined  covering for Dr Ochoa   on bed  mildly depressed but no thoughts of suicide or harm to others  c/o mild  abd pain  no nausea or vomiting  had rectal suppos  had 1 bm today   mild lower abd pain  no dysurea   vs stable physical done  lungs clear abd soft bs nml nt nd   cns awake nf  labs noted  hgb 8.5   creat 1.06  a/p recurrent uti possibly sec to nephrostomy tube second bladder ca   came with UTI  now better  was on zosyn  now on amoxicilln as per id   pt wnts lower dose of amoxicillin 250 q8  i d/w ID she is ok   extra water   avoid constipation   D/C planning    Pt. is medically stable for discharge, declines discharge stating she needs to test negative for COVID before she leaves hospital.  Explained to pt. that she might test positive for a long time and is safe to discharge to home.  Pt. converted 9/26, last COVID 10/4+.  Will repeat COVID PCR in am.      56 year old female from home, with PMHX of Bladder cancer (previously on chemo until 5/2022) with  Dr. Renay Trinidad, chronic hydronephrosis  R nephrostomy tube, hyperparathyroidism, bradycardia with PPM coming in for hematuria. Of note patient with hx multidrug resistant UTI, recent nephrostomy tube exchange 8/22/22. Started on Cefepime based on previous UCX- ID Jennifer consulted. Pending urine culture   CT A/P: Right nephrostomy tube in place without hydronephrosis. Stable superior left hydronephrosis. Marked irregular bladder wall thickening, unchanged. Progression of liver metastasis. Hospital course complicated by chest pain, EKG with possible inferior ischemia- Dr Clark consulted.  Patient seen this AM, complains 0f lower back pain, otherwise in NAD. Afebrile, no leukocytosis. Continue with Cefepime. Urine culture growing Enterococcus specie. Pending sensitivity result.  09/23: Cefepime discontinued and Linezolid 600 mg BID IV started. ID reccs appreciated. Plan to d/c home tomorrow on Linezolid  mg BID for a total of 7 days course. D/c plan pending MIRIAN betancourt for safe discharge as patient endorses she has no one to care for her in her home although patient reports she lives with 2 family members. Patient also does not feel that she should be discharged today as she does not know if she will develop a reaction to Linezolid and wants to be monitored. Discussed plan of care with ID and attending.  56 year old female from home, with PMHX of Bladder cancer (previously on chemo until 5/2022) with  Dr. Renay Trinidad, chronic hydronephrosis  R nephrostomy tube, hyperparathyroidism, bradycardia with PPM coming in for hematuria. Of note patient with hx multidrug resistant UTI, recent nephrostomy tube exchange 8/22/22. Started on Cefepime based on previous UCX- ID Rodriguez consulted  CT A/P: Right nephrostomy tube in place without hydronephrosis. Stable superior left hydronephrosis. Marked irregular bladder wall thickening, unchanged. Progression of liver metastasis.  Urine culture grew Enterococcus specie. Hospital course c/b COVID + pcr on 09/21. Patient has been medically optimized and completed antibiotic course on 09/30 but patient has been refusing discharge and appealed discharge twice, and denied twice. Today patient's quarantine has been completed and isolation d/c'd patient, although patient continues to test + on PCR. Pt. was approached by CM and states that she is appealing her d/c a third time. Refused to speak to this provider this AM and stated "I don't want to talk to anyone, I am so upset"

## 2022-10-07 NOTE — PROGRESS NOTE ADULT - SUBJECTIVE AND OBJECTIVE BOX
NP Note discussed with  Primary Attending    Patient is a 56y old  Female who presents with a chief complaint of Chest Pain, UTI (06 Oct 2022 19:46)      INTERVAL HPI/OVERNIGHT EVENTS: refused to speak to provider, speaking on the phone, " i dont want to talk to any body i am so upset"    MEDICATIONS  (STANDING):  enoxaparin Injectable 40 milliGRAM(s) SubCutaneous every 24 hours  ferrous    sulfate 325 milliGRAM(s) Oral three times a day  folic acid 1 milliGRAM(s) Oral daily  multivitamin 1 Tablet(s) Oral daily  polyethylene glycol 3350 17 Gram(s) Oral two times a day  senna 2 Tablet(s) Oral at bedtime  traMADol 25 milliGRAM(s) Oral every 6 hours    MEDICATIONS  (PRN):  acetaminophen     Tablet .. 650 milliGRAM(s) Oral every 6 hours PRN Mild Pain (1 - 3)      __________________________________________________  REVIEW OF SYSTEMS:    unable to obtain due to patient refusal      Vital Signs Last 24 Hrs  T(C): 36.4 (07 Oct 2022 13:23), Max: 36.7 (06 Oct 2022 20:11)  T(F): 97.6 (07 Oct 2022 13:23), Max: 98 (06 Oct 2022 20:11)  HR: 79 (07 Oct 2022 13:23) (65 - 79)  BP: 102/59 (07 Oct 2022 13:23) (100/57 - 102/59)  BP(mean): --  RR: 16 (07 Oct 2022 13:23) (16 - 16)  SpO2: 99% (07 Oct 2022 13:23) (99% - 100%)    Parameters below as of 07 Oct 2022 13:23  Patient On (Oxygen Delivery Method): room air        ________________________________________________  PHYSICAL EXAM:    General: Cachetic; anxious; sitting at the edge of bed  unable to obtain due to patient refusal  _________________________________________________  LABS:              CAPILLARY BLOOD GLUCOSE            RADIOLOGY & ADDITIONAL TESTS:    < from: CT Chest No Cont (09.20.22 @ 01:33) >    IMPRESSION:  Chest CT: Interval progression of cavitary pulmonary metastatic disease   with increased size and number of pulmonary nodules. No evidence of   pneumonia.    CT abdomen/pelvis: Right nephrostomy tube in place without   hydronephrosis. Stable superior left hydronephrosis. Marked irregular   bladder wall thickening, unchanged.  Progression of liver metastasis. Stable retroperitoneal adenopathy.    < end of copied text >      < from: Xray Chest 1 View- PORTABLE-Routine (Xray Chest 1 View- PORTABLE-Routine in AM.) (10.03.22 @ 14:47) >  IMPRESSION: Pulmonary metastases somewhat increased in number.    < end of copied text >      Imaging Personally Reviewed:  YES    Consultant(s) Notes Reviewed:   YES    Plan of care was discussed with patient and /or primary care giver; all questions and concerns were addressed and care was aligned with patient's wishes.

## 2022-10-08 NOTE — PROGRESS NOTE ADULT - ASSESSMENT
56 year old female from home, with PMHX of Bladder cancer (previously on chemo until 5/2022) with  Dr. Renay Trinidad, chronic hydronephrosis, right nephrostomy tube, hyperparathyroidism, bradycardia with PPM coming in for hematuria. Of note patient with hx multidrug resistant UTI, recent nephrostomy tube exchange 8/22/22.     ·  Problem:  + covid - 19 swab    pt with minimal symptoms, rare cough, off O2       monitor        supportive care       nutrition / hydration, IS, vitamins        no need for isolation  disputed discharge due to the fact that she has an immunocompromised daughter at home.   Explained to patient that since she is not symptomatic, not febrile and already more than 10 days quarantined pt is no danger to her daughter, pt appealing discharge, wants to go home when tests turn negative.   Repeat COVID test ordered.          ·  Problem: Complicated UTI (urinary tract infection).   ·  Plan: P/W Hematuria in nephrostomy drainage bag   hx of multi drug resistant UTIs   finished Oral Abx per ID plan  ID Rodriguez following  pain mgmt as above       ·  Problem: Metastatic cancer.   ·  Plan: metastatic bladder CA   CT confirms progression of disease with mets to lung and liver   last chemo 05/2022  plan to continue treatment once discharged.  f/u onc as OP    ·  Problem: vaginal bleed    appears to be related to her malignancy / mets..     pt evaluated by GYN last admission but declined Pelvic exam and sono     bleeding has lessened : monitor : OP follow up     monitor H.H     pt declining blood transfusion     ·  Problem: Anemia    Iron deficiency    Folate deficiency  >> supplements as ordered  monitor      ·  Problem: Atypical chest pain.  ·  Plan: c/p flank pain that radiates to chest   monitor       ·  Problem: Hydronephrosis.  ·  Plan: s/p R nephrostomy tube exchange 08/2022  chronic stable left hydro   monitor urine output   trend BMP.      ·  Problem: Prophylactic measure.   ·  Plan: Lovenox for DVT ppx.    Discharge planning issues as above, administration is involved as well and helping in this matter. Discussed with Dr. Kathrine Cam today.    --------------------------------------------  Case discussed with pt, and NP     Mercy Tobin MD  (Covering for Dr. LUIS ANGEL Ochoa)

## 2022-10-08 NOTE — PROGRESS NOTE ADULT - SUBJECTIVE AND OBJECTIVE BOX
Patient is a 56 year old  female who presents with a chief complaint of Chest Pain, UTI (07 Oct 2022 16:36)    Patient seen and examined by me earlier today. 56 year old woman admitted with UTI. She reports vaginal bleeding on and off.  Patient refusing to go home as she is positive for COVID, and disputed discharge due to the fact that she has an immunocompromised daughter at home. Explained to patient that since she is not symptomatic, not febrile and already more than 10 days quarantined pt is no danger to her daughter, pt appealing discharge, wants to go home when tests turn negative. Repeat COVID test ordered.      MEDICATIONS  (STANDING):  enoxaparin Injectable 40 milliGRAM(s) SubCutaneous every 24 hours  ferrous    sulfate 325 milliGRAM(s) Oral three times a day  folic acid 1 milliGRAM(s) Oral daily  multivitamin 1 Tablet(s) Oral daily  polyethylene glycol 3350 17 Gram(s) Oral two times a day  senna 2 Tablet(s) Oral at bedtime  traMADol 25 milliGRAM(s) Oral every 6 hours    MEDICATIONS  (PRN):  acetaminophen     Tablet .. 650 milliGRAM(s) Oral every 6 hours PRN Mild Pain (1 - 3)      REVIEW OF SYSTEMS:  CONSTITUTIONAL: No fever, weight loss, has fatigue  EYES: No eye pain, visual disturbances, or discharge  ENMT:  No difficulty hearing, tinnitus, vertigo; No sinus or throat pain  NECK: No pain or stiffness  RESPIRATORY: No cough, wheezing, chills or hemoptysis; No shortness of breath  CARDIOVASCULAR: No chest pain, palpitations, dizziness, or leg swelling  GASTROINTESTINAL: No abdominal or epigastric pain. No nausea, vomiting, or hematemesis; No diarrhea or constipation. No melena or hematochezia.  GENITOURINARY: No dysuria, frequency, hematuria, or incontinence  NEUROLOGICAL: No headaches, memory loss, loss of strength, numbness, or tremors  SKIN: No itching, burning, rashes, or lesions   ENDOCRINE: No heat or cold intolerance; No hair loss  MUSCULOSKELETAL: Intermittent back and, left flank pain. No joint pain or swelling; No muscle, or extremity pain  PSYCHIATRIC: No depression, anxiety, mood swings, or difficulty sleeping  HEME/LYMPH: No easy bruising, or bleeding gums  ALLERGY AND IMMUNOLOGIC: No hives or eczema      PHYSICAL EXAM:    T(C): 36.6 (10-08-22 @ 05:29), Max: 36.8 (10-07-22 @ 20:41)  HR: 66 (10-08-22 @ 05:29) (66 - 66)  BP: 116/59 (10-08-22 @ 05:29) (104/56 - 116/59)  RR: 16 (10-08-22 @ 05:29) (16 - 17)  SpO2: 100% (10-08-22 @ 05:29) (100% - 100%)    I&O's Summary    07 Oct 2022 07:01  -  08 Oct 2022 07:00  --------------------------------------------------------  IN: 0 mL / OUT: 600 mL / NET: -600 mL        GENERAL: NAD, cachectic , weak appearing   HEAD:  Atraumatic, Normocephalic  EYES: EOMI, PERRL, conjunctiva and sclera clear  ENMT: Moist mucous membranes  NECK: Supple, No JVD, Normal thyroid  NERVOUS SYSTEM:  Alert & Oriented X3, no focal deficit  CHEST/LUNG: Clear to ascultation bilaterally; No rales, rhonchi, wheezing, or rubs  HEART: Regular rate and rhythm; No murmurs, rubs, or gallops  ABDOMEN: Soft, Nontender, Nondistended; Bowel sounds present, nephrostomy bag / leg bag draining clear yellow urine   EXTREMITIES:  2+ Peripheral Pulses, No clubbing, cyanosis, or edema  SKIN: No rashes or lesions      RADIOLOGY & ADDITIONAL TESTS:    Imaging Reviewed:  [x] YES  [ ] NO    Consultant(s) Notes Reviewed:  [x] YES  [ ] NO    Care Discussed with Consultants/Other Providers [x] YES  [ ] NO

## 2022-10-09 NOTE — PROGRESS NOTE ADULT - SUBJECTIVE AND OBJECTIVE BOX
Patient is a 56y old  Female who presents with a chief complaint of Chest Pain, UTI (08 Oct 2022 13:26)        MEDICATIONS  (STANDING):  enoxaparin Injectable 40 milliGRAM(s) SubCutaneous every 24 hours  ferrous    sulfate 325 milliGRAM(s) Oral three times a day  folic acid 1 milliGRAM(s) Oral daily  multivitamin 1 Tablet(s) Oral daily  polyethylene glycol 3350 17 Gram(s) Oral two times a day  senna 2 Tablet(s) Oral at bedtime    MEDICATIONS  (PRN):  acetaminophen     Tablet .. 650 milliGRAM(s) Oral every 6 hours PRN Mild Pain (1 - 3)      REVIEW OF SYSTEMS:  CONSTITUTIONAL: No fever, weight loss, or fatigue  EYES: No eye pain, visual disturbances, or discharge  ENMT:  No difficulty hearing, tinnitus, vertigo; No sinus or throat pain  NECK: No pain or stiffness  RESPIRATORY: No cough, wheezing, chills or hemoptysis; No shortness of breath  CARDIOVASCULAR: No chest pain, palpitations, dizziness, or leg swelling  GASTROINTESTINAL: No abdominal or epigastric pain. No nausea, vomiting, or hematemesis; No diarrhea or constipation. No melena or hematochezia.  GENITOURINARY: No dysuria, frequency, hematuria, or incontinence  NEUROLOGICAL: No headaches, memory loss, loss of strength, numbness, or tremors  SKIN: No itching, burning, rashes, or lesions   LYMPH NODES: No enlarged glands  ENDOCRINE: No heat or cold intolerance; No hair loss  MUSCULOSKELETAL: No joint pain or swelling; No muscle, back, or extremity pain  PSYCHIATRIC: No depression, anxiety, mood swings, or difficulty sleeping  HEME/LYMPH: No easy bruising, or bleeding gums  ALLERY AND IMMUNOLOGIC: No hives or eczema    PHYSICAL EXAM:    T(C): 36.7 (10-09-22 @ 13:30), Max: 36.7 (10-09-22 @ 13:30)  HR: 86 (10-09-22 @ 13:30) (66 - 86)  BP: 108/67 (10-09-22 @ 13:30) (101/52 - 116/60)  RR: 16 (10-09-22 @ 13:30) (16 - 18)  SpO2: 100% (10-09-22 @ 13:30) (100% - 100%)        GENERAL: NAD, well-groomed, well-developed  HEAD:  Atraumatic, Normocephalic  EYES: EOMI, PERRL, conjunctiva and sclera clear  ENMT: No tonsillar erythema, exudates, or enlargement; Moist mucous membranes, Good dentition, No lesions  NECK: Supple, No JVD, Normal thyroid  NERVOUS SYSTEM:  Alert & Oriented X3, Good concentration; Motor Strength 5/5 B/L upper and lower extremities; DTRs 2+ intact and symmetric  CHEST/LUNG: Clear to ascultation bilaterally; No rales, rhonchi, wheezing, or rubs  HEART: Regular rate and rhythm; No murmurs, rubs, or gallops  ABDOMEN: Soft, Nontender, Nondistended; Bowel sounds present  EXTREMITIES:  2+ Peripheral Pulses, No clubbing, cyanosis, or edema  LYMPH: No lymphadenopathy noted  SKIN: No rashes or lesions    LABS:                        8.7    9.79  )-----------( 398      ( 08 Oct 2022 19:56 )             29.5     10-08    137  |  105  |  24<H>  ----------------------------<  102<H>  4.5   |  26  |  0.99    Ca    9.8      08 Oct 2022 19:56    TPro  7.7  /  Alb  2.8<L>  /  TBili  0.1<L>  /  DBili  x   /  AST  20  /  ALT  15  /  AlkPhos  95  10-08        RADIOLOGY & ADDITIONAL TESTS:    Imaging Reviewed:  [x] YES  [ ] NO    Consultant(s) Notes Reviewed:  [x] YES  [ ] NO    Care Discussed with Consultants/Other Providers [x] YES  [ ] NO Patient is a 56 year old  Female who presents with a chief complaint of Chest Pain, UTI (08 Oct 2022 13:26)    Patient seen and examined by me earlier today. 56 year old woman admitted with UTI. She reports vaginal bleeding on and off.  Patient refusing to go home as she is positive for COVID, and disputed discharge due to the fact that she has an immunocompromised daughter at home. Explained to patient that since she is not symptomatic, not febrile and already more than 10 days quarantined pt is no danger to her daughter, pt appealing discharge, wants to go home when tests turn negative. Patient declined COVID test yesterday. Repeat COVID test ordered.     MEDICATIONS  (STANDING):  enoxaparin Injectable 40 milliGRAM(s) SubCutaneous every 24 hours  ferrous    sulfate 325 milliGRAM(s) Oral three times a day  folic acid 1 milliGRAM(s) Oral daily  multivitamin 1 Tablet(s) Oral daily  polyethylene glycol 3350 17 Gram(s) Oral two times a day  senna 2 Tablet(s) Oral at bedtime    MEDICATIONS  (PRN):  acetaminophen     Tablet .. 650 milliGRAM(s) Oral every 6 hours PRN Mild Pain (1 - 3)      REVIEW OF SYSTEMS:  CONSTITUTIONAL: No fever, weight loss, has fatigue  EYES: No eye pain, visual disturbances, or discharge  ENMT:  No difficulty hearing, tinnitus, vertigo; No sinus or throat pain  NECK: No pain or stiffness  RESPIRATORY: No cough, wheezing, chills or hemoptysis; No shortness of breath  CARDIOVASCULAR: No chest pain, palpitations, dizziness, or leg swelling  GASTROINTESTINAL: No abdominal or epigastric pain. No nausea, vomiting, or hematemesis; No diarrhea or constipation. No melena or hematochezia.  GENITOURINARY: No dysuria, frequency, hematuria, or incontinence  NEUROLOGICAL: No headaches, memory loss, loss of strength, numbness, or tremors  SKIN: No itching, burning, rashes, or lesions   ENDOCRINE: No heat or cold intolerance; No hair loss  MUSCULOSKELETAL: No joint pain or swelling; No muscle, back, or extremity pain  PSYCHIATRIC: No depression, anxiety, mood swings, or difficulty sleeping  HEME/LYMPH: No easy bruising, or bleeding gums  ALLERY AND IMMUNOLOGIC: No hives or eczema    PHYSICAL EXAM:    T(C): 36.7 (10-09-22 @ 13:30), Max: 36.7 (10-09-22 @ 13:30)  HR: 86 (10-09-22 @ 13:30) (66 - 86)  BP: 108/67 (10-09-22 @ 13:30) (101/52 - 116/60)  RR: 16 (10-09-22 @ 13:30) (16 - 18)  SpO2: 100% (10-09-22 @ 13:30) (100% - 100%)          GENERAL: NAD, cachectic, weak appearing   HEAD:  Atraumatic, Normocephalic  EYES: EOMI, PERRL, conjunctiva and sclera clear  ENMT: Moist mucous membranes  NECK: Supple, No JVD, Normal thyroid  NERVOUS SYSTEM:  Alert & Oriented X3, no focal deficit  CHEST/LUNG: Clear to ascultation bilaterally; No rales, rhonchi, wheezing, or rubs  HEART: Regular rate and rhythm; No murmurs, rubs, or gallops  ABDOMEN: Soft, Nontender, Nondistended; Bowel sounds present, right nephrostomy bag / leg bag draining clear yellow urine   EXTREMITIES:  2+ Peripheral Pulses, No clubbing, cyanosis, or edema  SKIN: No rashes or lesions        LABS:                        8.7    9.79  )-----------( 398      ( 08 Oct 2022 19:56 )             29.5     10-08    137  |  105  |  24<H>  ----------------------------<  102<H>  4.5   |  26  |  0.99    Ca    9.8      08 Oct 2022 19:56    TPro  7.7  /  Alb  2.8<L>  /  TBili  0.1<L>  /  DBili  x   /  AST  20  /  ALT  15  /  AlkPhos  95  10-08        RADIOLOGY & ADDITIONAL TESTS:    Imaging Reviewed:  [x] YES  [ ] NO    Consultant(s) Notes Reviewed:  [x] YES  [ ] NO    Care Discussed with Consultants/Other Providers [x] YES  [ ] NO

## 2022-10-09 NOTE — PROGRESS NOTE ADULT - ASSESSMENT
56 year old female from home, with PMHX of Bladder cancer (previously on chemo until 5/2022) with  Dr. Renay Trinidad, chronic hydronephrosis, right nephrostomy tube, hyperparathyroidism, bradycardia with PPM coming in for hematuria. Of note patient with hx multidrug resistant UTI, recent nephrostomy tube exchange 8/22/22.     ·  Problem:  + covid - 19 swab    pt with minimal symptoms, rare cough, off O2       monitor        supportive care       nutrition / hydration, IS, vitamins        no need for isolation  disputed discharge due to the fact that she has an immunocompromised daughter at home.   Explained to patient that since she is not symptomatic, not febrile and already more than 10 days quarantined pt is no danger to her daughter, pt appealing discharge, wants to go home when tests turn negative. Patient declined COVID test yesterday.  Repeat COVID test ordered.          ·  Problem: Complicated UTI (urinary tract infection).   ·  Plan: P/W Hematuria in nephrostomy drainage bag   hx of multi drug resistant UTIs   finished Oral Abx per ID plan  ID Rodriguez following  pain mgmt as above       ·  Problem: Metastatic cancer.   ·  Plan: metastatic bladder CA   CT confirms progression of disease with mets to lung and liver   last chemo 05/2022  plan to continue treatment once discharged.  f/u onc as OP    ·  Problem: vaginal bleed    appears to be related to her malignancy / mets..     pt evaluated by GYN last admission but declined Pelvic exam and sono     bleeding has lessened : monitor : OP follow up     monitor H/H - currently stable      ·  Problem: Anemia    Iron deficiency    Folate deficiency  >> supplements as ordered  monitor      ·  Problem: Atypical chest pain.  ·  Plan: c/p flank pain that radiates to chest   monitor       ·  Problem: Hydronephrosis.  ·  Plan: s/p R nephrostomy tube exchange 08/2022  chronic stable left hydro   monitor urine output   trend BMP.      ·  Problem: Prophylactic measure.   ·  Plan: Lovenox for DVT ppx.    Discharge planning issues as above, administration is involved as well and helping in this matter. Discussed with Dr. Kathrine Cam yesterday.    --------------------------------------------  Case discussed with pt, and NP     Mercy Tobin MD  (Covering for Dr. LUIS ANGEL Ochoa)

## 2022-10-10 NOTE — PROGRESS NOTE ADULT - PROBLEM SELECTOR PLAN 6
- Patient will need to follow up with her Oncologist and GYN Oncologist.  - Pt refusing discharge, 24hr notice was given, pt appealed x2 and was rejected x2  10/6-Still refusing discharge stating she can not discharge while testing COVID+  10/7- patient approached by CM and states she will be appealing again - Patient will need to follow up with her Oncologist and GYN Oncologist.  - Pt refusing discharge, 24hr notice was given, pt appealed x2 and was rejected x2  -Still refusing discharge stating she can not discharge while testing COVID+  10/10- patient approached by CM and states she will be appealing again

## 2022-10-10 NOTE — PROGRESS NOTE ADULT - SUBJECTIVE AND OBJECTIVE BOX
Patient is a 56y old  Female who presents with a chief complaint of Chest Pain, UTI (09 Oct 2022 14:41)    INTERVAL HPI/OVERNIGHT EVENTS: no acute events overnight    REVIEW OF SYSTEMS:  CONSTITUTIONAL: No fever, chills  ENMT:  No difficulty hearing, no change in vision  NECK: No pain or stiffness  RESPIRATORY: No cough, SOB  CARDIOVASCULAR: No chest pain, palpitations  GASTROINTESTINAL: No abdominal pain. No nausea, vomiting, or diarrhea  GENITOURINARY: No dysuria  NEUROLOGICAL: No HA  SKIN: No itching, burning, rashes, or lesions   LYMPH NODES: No enlarged glands  ENDOCRINE: No heat or cold intolerance; No hair loss  MUSCULOSKELETAL: No joint pain or swelling; No muscle, back, or extremity pain  PSYCHIATRIC: No depression, anxiety  HEME/LYMPH: No easy bruising, or bleeding gums    T(C): 36.8 (10-10-22 @ 13:05), Max: 37 (10-09-22 @ 20:33)  HR: 77 (10-10-22 @ 13:05) (68 - 77)  BP: 134/64 (10-10-22 @ 13:05) (97/60 - 134/64)  RR: 16 (10-10-22 @ 13:05) (16 - 18)  SpO2: 100% (10-10-22 @ 13:05) (100% - 100%)  Wt(kg): --Vital Signs Last 24 Hrs  T(C): 36.8 (10 Oct 2022 13:05), Max: 37 (09 Oct 2022 20:33)  T(F): 98.2 (10 Oct 2022 13:05), Max: 98.6 (09 Oct 2022 20:33)  HR: 77 (10 Oct 2022 13:05) (68 - 77)  BP: 134/64 (10 Oct 2022 13:05) (97/60 - 134/64)  BP(mean): 80 (10 Oct 2022 13:05) (80 - 80)  RR: 16 (10 Oct 2022 13:05) (16 - 18)  SpO2: 100% (10 Oct 2022 13:05) (100% - 100%)    Parameters below as of 10 Oct 2022 13:05  Patient On (Oxygen Delivery Method): room air    MEDICATIONS  (STANDING):  enoxaparin Injectable 40 milliGRAM(s) SubCutaneous every 24 hours  ferrous    sulfate 325 milliGRAM(s) Oral three times a day  folic acid 1 milliGRAM(s) Oral daily  multivitamin 1 Tablet(s) Oral daily  polyethylene glycol 3350 17 Gram(s) Oral two times a day  senna 2 Tablet(s) Oral at bedtime    MEDICATIONS  (PRN):  acetaminophen     Tablet .. 650 milliGRAM(s) Oral every 6 hours PRN Mild Pain (1 - 3)      PHYSICAL EXAM:  GENERAL: NAD  EYES: clear conjunctiva; EOMI  ENMT: Moist mucous membranes  NECK: Supple, No JVD, Normal thyroid  CHEST/LUNG: Clear to auscultation bilaterally; No rales, rhonchi, wheezing, or rubs  HEART: S1, S2, Regular rate and rhythm  ABDOMEN: Soft, Nontender, Nondistended; Bowel sounds present  NEURO: Alert & Oriented X3  EXTREMITIES: No LE edema, no calf tenderness  LYMPH: No lymphadenopathy noted  SKIN: No rashes or lesions  Gu: R neph tube dressing cdi    Consultant(s) Notes Reviewed:  [x ] YES  [ ] NO  Care Discussed with Consultants/Other Providers [ x] YES  [ ] NO    LABS:                        8.7    9.79  )-----------( 398      ( 08 Oct 2022 19:56 )             29.5     10-08    137  |  105  |  24<H>  ----------------------------<  102<H>  4.5   |  26  |  0.99    Ca    9.8      08 Oct 2022 19:56    TPro  7.7  /  Alb  2.8<L>  /  TBili  0.1<L>  /  DBili  x   /  AST  20  /  ALT  15  /  AlkPhos  95  10-08      CAPILLARY BLOOD GLUCOSE    RADIOLOGY & ADDITIONAL TESTS:    Imaging Personally Reviewed:  [ x] YES  [ ] NO  < from: Xray Chest 1 View- PORTABLE-Routine (Xray Chest 1 View- PORTABLE-Routine in AM.) (10.03.22 @ 14:47) >    ACC: [id number] EXAM:  XR CHEST PORTABLE ROUTINE 1V                          PROCEDURE DATE:  10/03/2022          INTERPRETATION:  AP erect chest on October 13, 2022 at 12:36 PM. Patient   has cough and is Covid positive.    Heart size is within normal limits.    Left-sided pacemaker again noted.    Multiple pulmonary metastases are again noted.    No infiltrate.    Metastatic areas have increased somewhat in number compared to August 16.    IMPRESSION: Pulmonary metastases somewhat increased in number.    --- End of Report ---            ERIC LYNN MD; Attending Radiologist  This document has been electronically signed. Oct  3 2022  2:50PM    < end of copied text >  < from: CT Abdomen and Pelvis No Cont (09.20.22 @ 01:34) >  ACC: [id number] EXAM:  CT ABDOMEN AND PELVIS                        ACC: [id number] EXAM:  CT CHEST                          PROCEDURE DATE:  09/20/2022          INTERPRETATION:  CLINICAL INFORMATION: Chest pain. Right nephrostomy   tube, hematuria.    COMPARISON: CT chest, abdomen and pelvis 7/31/2022, abdominal sonogram   8/18/2022    CONTRAST/COMPLICATIONS:  IV Contrast: None  Oral Contrast: None  Complications: None    PROCEDURE:  CT of the Chest, Abdomen and Pelvis was performed without intravenous   contrast.  Intravenous contrast: None.  Oral contrast: positive contrast was administered.  Sagittal and coronal reformats were performed.    FINDINGS:    CHEST:    LUNGS AND LARGE AIRWAYS: Patent central airways. Bilateral pulmonary   noduleshave again increased in size and number since the recent prior   chest CT. For example, left lower lobe nodule now measures 2.8 x 2.0 cm   and has cavitation (prior 2.5 x 1.9 cm). A right lower lobe nodule now   measures 2.5 x 2.0 cm (prior 2.0 x 1.8cm) with interval resolution of   cavitation within this nodule. Adjacent nodule now appears cavitary.  PLEURA: No pleural effusion. No pneumothorax.  VESSELS: Within normal limits.  HEART: Heart size is normal. Trace pericardial effusion. Distal leads of   cardiac device overlying the right atrium and ventricle, unchanged.  MEDIASTINUM AND FLORENTIN: No lymphadenopathy.  CHEST WALL AND LOWER NECK: Left chest wall dual-lead cardiac device.    ABDOMEN AND PELVIS:    LIVER: 2.4 cm hypodense lesion in the right hepatic lobe not   significantly changed from the prior exam. There are several new   hypodense lesions within the left and right hepatic lobes as for example   the left hepatic lobe on series 4 image 114 measures 2 cm and in the   right hepatic lobe 0.9 cm on series 4 image 134.  BILE DUCTS: Normal caliber.  GALLBLADDER: Within normal limits.  SPLEEN: Within normal limits.  PANCREAS: Within normal limits.  ADRENALS: Within normal limits.  KIDNEYS/URETERS: Right nephrostomy tube in place. No hydronephrosis or   perinephric stranding on the right. Severe left hydronephrosis and   scattered calcifications, unchanged. Minimal left perinephric stranding   similar to the prior exam.    BLADDER: Marked bladder wall thickening similar to the prior exam.  REPRODUCTIVE ORGANS: Uterus and adnexal structures similar to prior exam.    BOWEL: No bowel obstruction. Appendix is normal. No bowel wall thickening   or pericolonic inflammatory change. Underdistended stomach which limits   evaluation of the wall thickness.  PERITONEUM: No ascites. No free air.  VESSELS:  Within normal limits.  RETROPERITONEUM: Retroperitoneal adenopathy is not significantly changed.   Left periaortic lymph node measures 2.5 cm  ABDOMINAL WALL: Within normal limits.  BONES: No aggressive osteoblastic or lytic lesion.    IMPRESSION:  Chest CT: Interval progression of cavitary pulmonary metastatic disease   with increased size and number of pulmonary nodules. No evidence of   pneumonia.    CT abdomen/pelvis: Right nephrostomy tube in place without   hydronephrosis. Stable superior left hydronephrosis. Marked irregular   bladder wall thickening, unchanged.  Progression of liver metastasis. Stable retroperitoneal adenopathy.    --- End of Report ---            SARAI DUGGAN MD; Attending Radiologist  This document has been electronically signed. Sep 20 2022  3:17AM    < end of copied text >       Patient is a 56 year old  Female who presents with a chief complaint of Chest Pain, UTI (09 Oct 2022 14:41)    INTERVAL HPI/OVERNIGHT EVENTS: no acute events overnight    REVIEW OF SYSTEMS:  CONSTITUTIONAL: No fever, chills  ENMT:  No difficulty hearing, no change in vision  NECK: No pain or stiffness  RESPIRATORY: No cough, SOB  CARDIOVASCULAR: No chest pain, palpitations  GASTROINTESTINAL: No abdominal pain. No nausea, vomiting, or diarrhea  GENITOURINARY: No dysuria  NEUROLOGICAL: No HA  SKIN: No itching, burning, rashes, or lesions   LYMPH NODES: No enlarged glands  ENDOCRINE: No heat or cold intolerance; No hair loss  MUSCULOSKELETAL: No joint pain or swelling; No muscle, back, or extremity pain  PSYCHIATRIC: No depression, anxiety  HEME/LYMPH: No easy bruising, or bleeding gums      Parameters below as of 10 Oct 2022 13:05  Patient On (Oxygen Delivery Method): room air    MEDICATIONS  (STANDING):  enoxaparin Injectable 40 milliGRAM(s) SubCutaneous every 24 hours  ferrous    sulfate 325 milliGRAM(s) Oral three times a day  folic acid 1 milliGRAM(s) Oral daily  multivitamin 1 Tablet(s) Oral daily  polyethylene glycol 3350 17 Gram(s) Oral two times a day  senna 2 Tablet(s) Oral at bedtime    MEDICATIONS  (PRN):  acetaminophen     Tablet .. 650 milliGRAM(s) Oral every 6 hours PRN Mild Pain (1 - 3)      PHYSICAL EXAM:    Vital Signs Last 24 Hrs  T(C): 36.8 (10 Oct 2022 13:05), Max: 37 (09 Oct 2022 20:33)  T(F): 98.2 (10 Oct 2022 13:05), Max: 98.6 (09 Oct 2022 20:33)  HR: 77 (10 Oct 2022 13:05) (68 - 77)  BP: 134/64 (10 Oct 2022 13:05) (97/60 - 134/64)  BP(mean): 80 (10 Oct 2022 13:05) (80 - 80)  RR: 16 (10 Oct 2022 13:05) (16 - 18)  SpO2: 100% (10 Oct 2022 13:05) (100% - 100%)      GENERAL: NAD  EYES: clear conjunctiva; EOMI  ENMT: Moist mucous membranes  NECK: Supple, No JVD, Normal thyroid  CHEST/LUNG: Clear to auscultation bilaterally; No rales, rhonchi, wheezing, or rubs  HEART: S1, S2, Regular rate and rhythm  ABDOMEN: Soft, Nontender, Nondistended; Bowel sounds present  NEURO: Alert & Oriented X3  EXTREMITIES: No LE edema, no calf tenderness  LYMPH: No lymphadenopathy noted  SKIN: No rashes or lesions  Gu: R neph tube dressing cdi    Consultant(s) Notes Reviewed:  [x ] YES  [ ] NO  Care Discussed with Consultants/Other Providers [ x] YES  [ ] NO    LABS:                        8.7    9.79  )-----------( 398      ( 08 Oct 2022 19:56 )             29.5     10-08    137  |  105  |  24<H>  ----------------------------<  102<H>  4.5   |  26  |  0.99    Ca    9.8      08 Oct 2022 19:56    TPro  7.7  /  Alb  2.8<L>  /  TBili  0.1<L>  /  DBili  x   /  AST  20  /  ALT  15  /  AlkPhos  95  10-08      CAPILLARY BLOOD GLUCOSE    RADIOLOGY & ADDITIONAL TESTS:    Imaging Personally Reviewed:  [ x] YES  [ ] NO  < from: Xray Chest 1 View- PORTABLE-Routine (Xray Chest 1 View- PORTABLE-Routine in AM.) (10.03.22 @ 14:47) >    ACC: [id number] EXAM:  XR CHEST PORTABLE ROUTINE 1V                          PROCEDURE DATE:  10/03/2022          INTERPRETATION:  AP erect chest on October 13, 2022 at 12:36 PM. Patient   has cough and is Covid positive.    Heart size is within normal limits.    Left-sided pacemaker again noted.    Multiple pulmonary metastases are again noted.    No infiltrate.    Metastatic areas have increased somewhat in number compared to August 16.    IMPRESSION: Pulmonary metastases somewhat increased in number.    --- End of Report ---            ERIC LYNN MD; Attending Radiologist  This document has been electronically signed. Oct  3 2022  2:50PM    < end of copied text >  < from: CT Abdomen and Pelvis No Cont (09.20.22 @ 01:34) >  ACC: [id number] EXAM:  CT ABDOMEN AND PELVIS                        ACC: [id number] EXAM:  CT CHEST                          PROCEDURE DATE:  09/20/2022          INTERPRETATION:  CLINICAL INFORMATION: Chest pain. Right nephrostomy   tube, hematuria.    COMPARISON: CT chest, abdomen and pelvis 7/31/2022, abdominal sonogram   8/18/2022    CONTRAST/COMPLICATIONS:  IV Contrast: None  Oral Contrast: None  Complications: None    PROCEDURE:  CT of the Chest, Abdomen and Pelvis was performed without intravenous   contrast.  Intravenous contrast: None.  Oral contrast: positive contrast was administered.  Sagittal and coronal reformats were performed.    FINDINGS:    CHEST:    LUNGS AND LARGE AIRWAYS: Patent central airways. Bilateral pulmonary   noduleshave again increased in size and number since the recent prior   chest CT. For example, left lower lobe nodule now measures 2.8 x 2.0 cm   and has cavitation (prior 2.5 x 1.9 cm). A right lower lobe nodule now   measures 2.5 x 2.0 cm (prior 2.0 x 1.8cm) with interval resolution of   cavitation within this nodule. Adjacent nodule now appears cavitary.  PLEURA: No pleural effusion. No pneumothorax.  VESSELS: Within normal limits.  HEART: Heart size is normal. Trace pericardial effusion. Distal leads of   cardiac device overlying the right atrium and ventricle, unchanged.  MEDIASTINUM AND FLORENTIN: No lymphadenopathy.  CHEST WALL AND LOWER NECK: Left chest wall dual-lead cardiac device.    ABDOMEN AND PELVIS:    LIVER: 2.4 cm hypodense lesion in the right hepatic lobe not   significantly changed from the prior exam. There are several new   hypodense lesions within the left and right hepatic lobes as for example   the left hepatic lobe on series 4 image 114 measures 2 cm and in the   right hepatic lobe 0.9 cm on series 4 image 134.  BILE DUCTS: Normal caliber.  GALLBLADDER: Within normal limits.  SPLEEN: Within normal limits.  PANCREAS: Within normal limits.  ADRENALS: Within normal limits.  KIDNEYS/URETERS: Right nephrostomy tube in place. No hydronephrosis or   perinephric stranding on the right. Severe left hydronephrosis and   scattered calcifications, unchanged. Minimal left perinephric stranding   similar to the prior exam.    BLADDER: Marked bladder wall thickening similar to the prior exam.  REPRODUCTIVE ORGANS: Uterus and adnexal structures similar to prior exam.    BOWEL: No bowel obstruction. Appendix is normal. No bowel wall thickening   or pericolonic inflammatory change. Underdistended stomach which limits   evaluation of the wall thickness.  PERITONEUM: No ascites. No free air.  VESSELS:  Within normal limits.  RETROPERITONEUM: Retroperitoneal adenopathy is not significantly changed.   Left periaortic lymph node measures 2.5 cm  ABDOMINAL WALL: Within normal limits.  BONES: No aggressive osteoblastic or lytic lesion.    IMPRESSION:  Chest CT: Interval progression of cavitary pulmonary metastatic disease   with increased size and number of pulmonary nodules. No evidence of   pneumonia.    CT abdomen/pelvis: Right nephrostomy tube in place without   hydronephrosis. Stable superior left hydronephrosis. Marked irregular   bladder wall thickening, unchanged.  Progression of liver metastasis. Stable retroperitoneal adenopathy.    --- End of Report ---            SARAI DUGGAN MD; Attending Radiologist  This document has been electronically signed. Sep 20 2022  3:17AM    < end of copied text >

## 2022-10-10 NOTE — PROGRESS NOTE ADULT - PROBLEM SELECTOR PLAN 1
- resolved  - complicated  - a/w hematuria in nephrostomy bag  - Has Hx of multi drug resistant UTIs  -UCX positive for enterococcus   - Completed Abx 9/30  - Afebrile

## 2022-10-10 NOTE — PROGRESS NOTE ADULT - PROBLEM SELECTOR PLAN 4
- Intermittent vaginal bleeding endorsed  - Seen by GYN on 08/22  and at that time Pt deferred pelvic exam and pelvic sonogram as she was worked up on previous admission and recommended to follow up outpatient at [facility]. No acute interventions at that time.   - Pt endorses that she wants to address with her oncologist for pelvic exam and for transvaginal US. Encouraged her to see a GYN oncologist and referral given.   - H&H stable.   - trend CBC

## 2022-10-10 NOTE — PROGRESS NOTE ADULT - PROBLEM SELECTOR PLAN 2
with metastasis to liver and lungs confirmed by CT  -Last chemo 5/20/22  -Plan to cont tx at [place] with Dr. Hoyt  -Pain mgnt with Tramadol

## 2022-10-11 NOTE — PROGRESS NOTE ADULT - PROBLEM SELECTOR PLAN 4
- Intermittent vaginal bleeding endorsed  - Seen by GYN on 08/22  and at that time Pt deferred pelvic exam and pelvic sonogram as she was worked up on previous admission and recommended to follow up outpatient at [facility]. No acute interventions at that time.   - Pt endorses that she wants to address with her oncologist for pelvic exam and for transvaginal US. Encouraged her to see a GYN oncologist and referral given.   - H&H stable.   - trend CBC

## 2022-10-11 NOTE — PROGRESS NOTE ADULT - PROBLEM SELECTOR PLAN 6
- Patient will need to follow up with her Oncologist and GYN Oncologist.  - Pt refusing discharge, 24hr notice was given, pt appealed x2 and was rejected x2  -Still refusing discharge stating she can not discharge while testing COVID+  10/10- patient approached by CM and states she will be appealing again

## 2022-10-11 NOTE — PROGRESS NOTE ADULT - PROBLEM SELECTOR PLAN 2
with metastasis to liver and lungs confirmed by CT  -Last chemo 5/20/22  -Plan to cont tx at [place] with Dr. Hoyt  -Pain mgnt with Tramadol

## 2022-10-11 NOTE — PROGRESS NOTE ADULT - NS ATTEND AMEND GEN_ALL_CORE FT
Yes
Patient seen and examined earlier today. Patient's history, vitals, labs, imaging studies reviewed. Discussed with NP, agree with note with edits. Patient declined to speak with provider today as well.  Mercy Tobin MD
Patient seen and examined earlier today. Patient's history, vitals, labs, imaging studies reviewed. Discussed with NP, agree with note with edits. Patient declined to speak with provider.  Mercy Tobin MD

## 2022-10-11 NOTE — PROGRESS NOTE ADULT - ASSESSMENT
56 year old female from home, with PMHX of Bladder cancer (previously on chemo until 5/2022) with  Dr. Renay Trinidad, chronic hydronephrosis  R nephrostomy tube, hyperparathyroidism, bradycardia with PPM coming in for hematuria. Of note patient with hx multidrug resistant UTI, recent nephrostomy tube exchange 8/22/22. Started on Cefepime based on previous UCX- ID Rodriguez consulted  CT A/P: Right nephrostomy tube in place without hydronephrosis. Stable superior left hydronephrosis. Marked irregular bladder wall thickening, unchanged. Progression of liver metastasis.  Urine culture grew Enterococcus specie. Hospital course c/b COVID + pcr on 09/21. Patient has been medically optimized and completed antibiotic course on 09/30 but patient has been refusing discharge and appealed discharge twice, and denied twice. Today patient's quarantine has been completed and isolation d/c'd patient, although patient continues to test + on PCR. Pt. was approached by CM and states that she is appealing her d/c a third time. Refused to speak to this provider this AM and stated "I don't want to talk to anyone, I am so upset"

## 2022-10-11 NOTE — PROGRESS NOTE ADULT - SUBJECTIVE AND OBJECTIVE BOX
Patient is a 56y old  Female who presents with a chief complaint of Chest Pain, UTI (10 Oct 2022 16:43)    INTERVAL HPI/OVERNIGHT EVENTS: no acute events overnight    REVIEW OF SYSTEMS:  CONSTITUTIONAL: No fever, chills  ENMT:  No difficulty hearing, no change in vision  NECK: No pain or stiffness  RESPIRATORY: No cough, SOB  CARDIOVASCULAR: No chest pain, palpitations  GASTROINTESTINAL: No abdominal pain. No nausea, vomiting, or diarrhea  GENITOURINARY: No dysuria  NEUROLOGICAL: No HA  SKIN: No itching, burning, rashes, or lesions   LYMPH NODES: No enlarged glands  ENDOCRINE: No heat or cold intolerance; No hair loss  MUSCULOSKELETAL: No joint pain or swelling; No muscle, back, or extremity pain  PSYCHIATRIC: No depression, anxiety  HEME/LYMPH: No easy bruising, or bleeding gums    T(C): 36.4 (10-11-22 @ 05:18), Max: 36.8 (10-10-22 @ 13:05)  HR: 67 (10-11-22 @ 05:18) (64 - 77)  BP: 116/68 (10-11-22 @ 05:18) (110/61 - 134/64)  RR: 16 (10-11-22 @ 05:18) (16 - 16)  SpO2: 99% (10-11-22 @ 05:18) (99% - 100%)  Wt(kg): --Vital Signs Last 24 Hrs  T(C): 36.4 (11 Oct 2022 05:18), Max: 36.8 (10 Oct 2022 13:05)  T(F): 97.5 (11 Oct 2022 05:18), Max: 98.3 (10 Oct 2022 20:36)  HR: 67 (11 Oct 2022 05:18) (64 - 77)  BP: 116/68 (11 Oct 2022 05:18) (110/61 - 134/64)  BP(mean): 80 (10 Oct 2022 13:05) (80 - 80)  RR: 16 (11 Oct 2022 05:18) (16 - 16)  SpO2: 99% (11 Oct 2022 05:18) (99% - 100%)    Parameters below as of 11 Oct 2022 05:18  Patient On (Oxygen Delivery Method): room air    MEDICATIONS  (STANDING):  enoxaparin Injectable 40 milliGRAM(s) SubCutaneous every 24 hours  ferrous    sulfate 325 milliGRAM(s) Oral three times a day  folic acid 1 milliGRAM(s) Oral daily  multivitamin 1 Tablet(s) Oral daily  polyethylene glycol 3350 17 Gram(s) Oral two times a day  senna 2 Tablet(s) Oral at bedtime    MEDICATIONS  (PRN):  acetaminophen     Tablet .. 650 milliGRAM(s) Oral every 6 hours PRN Mild Pain (1 - 3)      PHYSICAL EXAM:  GENERAL: NAD  EYES: clear conjunctiva; EOMI  ENMT: Moist mucous membranes  NECK: Supple, No JVD, Normal thyroid  CHEST/LUNG: Clear to auscultation bilaterally; No rales, rhonchi, wheezing, or rubs  HEART: S1, S2, Regular rate and rhythm  ABDOMEN: Soft, Nontender, Nondistended; Bowel sounds present  NEURO: Alert & Oriented X3  EXTREMITIES: No LE edema, no calf tenderness  LYMPH: No lymphadenopathy noted  SKIN: No rashes or lesions  Gu: R Neph tube CDi    Consultant(s) Notes Reviewed:  [x ] YES  [ ] NO  Care Discussed with Consultants/Other Providers [ x] YES  [ ] NO    LABS:    CAPILLARY BLOOD GLUCOSE    RADIOLOGY & ADDITIONAL TESTS:    Imaging Personally Reviewed:  [x ] YES  [ ] NO  < from: Xray Chest 1 View- PORTABLE-Routine (Xray Chest 1 View- PORTABLE-Routine in AM.) (10.03.22 @ 14:47) >    ACC: [id number] EXAM:  XR CHEST PORTABLE ROUTINE 1V                          PROCEDURE DATE:  10/03/2022          INTERPRETATION:  AP erect chest on October 13, 2022 at 12:36 PM. Patient   has cough and is Covid positive.    Heart size is within normal limits.    Left-sided pacemaker again noted.    Multiple pulmonary metastases are again noted.    No infiltrate.    Metastatic areas have increased somewhat in number compared to August 16.    IMPRESSION: Pulmonary metastases somewhat increased in number.    --- End of Report ---            ERIC LYNN MD; Attending Radiologist  This document has been electronically signed. Oct  3 2022  2:50PM    < end of copied text >  < from: CT Abdomen and Pelvis No Cont (09.20.22 @ 01:34) >    ACC: [id number] EXAM:  CT ABDOMEN AND PELVIS                        ACC: [id number] EXAM:  CT CHEST                          PROCEDURE DATE:  09/20/2022          INTERPRETATION:  CLINICAL INFORMATION: Chest pain. Right nephrostomy   tube, hematuria.    COMPARISON: CT chest, abdomen and pelvis 7/31/2022, abdominal sonogram   8/18/2022    CONTRAST/COMPLICATIONS:  IV Contrast: None  Oral Contrast: None  Complications: None    PROCEDURE:  CT of the Chest, Abdomen and Pelvis was performed without intravenous   contrast.  Intravenous contrast: None.  Oral contrast: positive contrast was administered.  Sagittal and coronal reformats were performed.    FINDINGS:    CHEST:    LUNGS AND LARGE AIRWAYS: Patent central airways. Bilateral pulmonary   noduleshave again increased in size and number since the recent prior   chest CT. For example, left lower lobe nodule now measures 2.8 x 2.0 cm   and has cavitation (prior 2.5 x 1.9 cm). A right lower lobe nodule now   measures 2.5 x 2.0 cm (prior 2.0 x 1.8cm) with interval resolution of   cavitation within this nodule. Adjacent nodule now appears cavitary.  PLEURA: No pleural effusion. No pneumothorax.  VESSELS: Within normal limits.  HEART: Heart size is normal. Trace pericardial effusion. Distal leads of   cardiac device overlying the right atrium and ventricle, unchanged.  MEDIASTINUM AND FLORENTIN: No lymphadenopathy.  CHEST WALL AND LOWER NECK: Left chest wall dual-lead cardiac device.    ABDOMEN AND PELVIS:    LIVER: 2.4 cm hypodense lesion in the right hepatic lobe not   significantly changed from the prior exam. There are several new   hypodense lesions within the left and right hepatic lobes as for example   the left hepatic lobe on series 4 image 114 measures 2 cm and in the   right hepatic lobe 0.9 cm on series 4 image 134.  BILE DUCTS: Normal caliber.  GALLBLADDER: Within normal limits.  SPLEEN: Within normal limits.  PANCREAS: Within normal limits.  ADRENALS: Within normal limits.  KIDNEYS/URETERS: Right nephrostomy tube in place. No hydronephrosis or   perinephric stranding on the right. Severe left hydronephrosis and   scattered calcifications, unchanged. Minimal left perinephric stranding   similar to the prior exam.    BLADDER: Marked bladder wall thickening similar to the prior exam.  REPRODUCTIVE ORGANS: Uterus and adnexal structures similar to prior exam.    BOWEL: No bowel obstruction. Appendix is normal. No bowel wall thickening   or pericolonic inflammatory change. Underdistended stomach which limits   evaluation of the wall thickness.  PERITONEUM: No ascites. No free air.  VESSELS:  Within normal limits.  RETROPERITONEUM: Retroperitoneal adenopathy is not significantly changed.   Left periaortic lymph node measures 2.5 cm  ABDOMINAL WALL: Within normal limits.  BONES: No aggressive osteoblastic or lytic lesion.    IMPRESSION:  Chest CT: Interval progression of cavitary pulmonary metastatic disease   with increased size and number of pulmonary nodules. No evidence of   pneumonia.    CT abdomen/pelvis: Right nephrostomy tube in place without   hydronephrosis. Stable superior left hydronephrosis. Marked irregular   bladder wall thickening, unchanged.  Progression of liver metastasis. Stable retroperitoneal adenopathy.    --- End of Report ---            SARAI DUGGAN MD; Attending Radiologist  This document has been electronically signed. Sep 20 2022  3:17AM    < end of copied text >     Patient is a 56y old  Female who presents with a chief complaint of Chest Pain, UTI (10 Oct 2022 16:43)    INTERVAL HPI/OVERNIGHT EVENTS: no acute events overnight    REVIEW OF SYSTEMS:  CONSTITUTIONAL: No fever, chills  ENMT:  No difficulty hearing, no change in vision  NECK: No pain or stiffness  RESPIRATORY: No cough, SOB  CARDIOVASCULAR: No chest pain, palpitations  GASTROINTESTINAL: No abdominal pain. No nausea, vomiting, or diarrhea  GENITOURINARY: No dysuria  NEUROLOGICAL: No HA  SKIN: No itching, burning, rashes, or lesions   LYMPH NODES: No enlarged glands  ENDOCRINE: No heat or cold intolerance; No hair loss  MUSCULOSKELETAL: No joint pain or swelling; No muscle, back, or extremity pain  PSYCHIATRIC: No depression, anxiety  HEME/LYMPH: No easy bruising, or bleeding gums    Parameters below as of 11 Oct 2022 05:18  Patient On (Oxygen Delivery Method): room air    MEDICATIONS  (STANDING):  enoxaparin Injectable 40 milliGRAM(s) SubCutaneous every 24 hours  ferrous    sulfate 325 milliGRAM(s) Oral three times a day  folic acid 1 milliGRAM(s) Oral daily  multivitamin 1 Tablet(s) Oral daily  polyethylene glycol 3350 17 Gram(s) Oral two times a day  senna 2 Tablet(s) Oral at bedtime    MEDICATIONS  (PRN):  acetaminophen     Tablet .. 650 milliGRAM(s) Oral every 6 hours PRN Mild Pain (1 - 3)      PHYSICAL EXAM:    Vital Signs Last 24 Hrs  T(C): 36.4 (11 Oct 2022 05:18), Max: 36.8 (10 Oct 2022 13:05)  T(F): 97.5 (11 Oct 2022 05:18), Max: 98.3 (10 Oct 2022 20:36)  HR: 67 (11 Oct 2022 05:18) (64 - 77)  BP: 116/68 (11 Oct 2022 05:18) (110/61 - 134/64)  BP(mean): 80 (10 Oct 2022 13:05) (80 - 80)  RR: 16 (11 Oct 2022 05:18) (16 - 16)  SpO2: 99% (11 Oct 2022 05:18) (99% - 100%)      GENERAL: NAD  EYES: clear conjunctiva; EOMI  ENMT: Moist mucous membranes  NECK: Supple, No JVD, Normal thyroid  CHEST/LUNG: Clear to auscultation bilaterally; No rales, rhonchi, wheezing, or rubs  HEART: S1, S2, Regular rate and rhythm  ABDOMEN: Soft, Nontender, Nondistended; Bowel sounds present  NEURO: Alert & Oriented X3  EXTREMITIES: No LE edema, no calf tenderness  LYMPH: No lymphadenopathy noted  SKIN: No rashes or lesions  Gu: R Neph tube CDi    Consultant(s) Notes Reviewed:  [x ] YES  [ ] NO  Care Discussed with Consultants/Other Providers [ x] YES  [ ] NO    LABS:    CAPILLARY BLOOD GLUCOSE    RADIOLOGY & ADDITIONAL TESTS:    Imaging Personally Reviewed:  [x ] YES  [ ] NO  < from: Xray Chest 1 View- PORTABLE-Routine (Xray Chest 1 View- PORTABLE-Routine in AM.) (10.03.22 @ 14:47) >    ACC: [id number] EXAM:  XR CHEST PORTABLE ROUTINE 1V                          PROCEDURE DATE:  10/03/2022          INTERPRETATION:  AP erect chest on October 13, 2022 at 12:36 PM. Patient   has cough and is Covid positive.    Heart size is within normal limits.    Left-sided pacemaker again noted.    Multiple pulmonary metastases are again noted.    No infiltrate.    Metastatic areas have increased somewhat in number compared to August 16.    IMPRESSION: Pulmonary metastases somewhat increased in number.    --- End of Report ---            ERIC LYNN MD; Attending Radiologist  This document has been electronically signed. Oct  3 2022  2:50PM    < end of copied text >  < from: CT Abdomen and Pelvis No Cont (09.20.22 @ 01:34) >    ACC: [id number] EXAM:  CT ABDOMEN AND PELVIS                        ACC: [id number] EXAM:  CT CHEST                          PROCEDURE DATE:  09/20/2022          INTERPRETATION:  CLINICAL INFORMATION: Chest pain. Right nephrostomy   tube, hematuria.    COMPARISON: CT chest, abdomen and pelvis 7/31/2022, abdominal sonogram   8/18/2022    CONTRAST/COMPLICATIONS:  IV Contrast: None  Oral Contrast: None  Complications: None    PROCEDURE:  CT of the Chest, Abdomen and Pelvis was performed without intravenous   contrast.  Intravenous contrast: None.  Oral contrast: positive contrast was administered.  Sagittal and coronal reformats were performed.    FINDINGS:    CHEST:    LUNGS AND LARGE AIRWAYS: Patent central airways. Bilateral pulmonary   noduleshave again increased in size and number since the recent prior   chest CT. For example, left lower lobe nodule now measures 2.8 x 2.0 cm   and has cavitation (prior 2.5 x 1.9 cm). A right lower lobe nodule now   measures 2.5 x 2.0 cm (prior 2.0 x 1.8cm) with interval resolution of   cavitation within this nodule. Adjacent nodule now appears cavitary.  PLEURA: No pleural effusion. No pneumothorax.  VESSELS: Within normal limits.  HEART: Heart size is normal. Trace pericardial effusion. Distal leads of   cardiac device overlying the right atrium and ventricle, unchanged.  MEDIASTINUM AND FLORENTIN: No lymphadenopathy.  CHEST WALL AND LOWER NECK: Left chest wall dual-lead cardiac device.    ABDOMEN AND PELVIS:    LIVER: 2.4 cm hypodense lesion in the right hepatic lobe not   significantly changed from the prior exam. There are several new   hypodense lesions within the left and right hepatic lobes as for example   the left hepatic lobe on series 4 image 114 measures 2 cm and in the   right hepatic lobe 0.9 cm on series 4 image 134.  BILE DUCTS: Normal caliber.  GALLBLADDER: Within normal limits.  SPLEEN: Within normal limits.  PANCREAS: Within normal limits.  ADRENALS: Within normal limits.  KIDNEYS/URETERS: Right nephrostomy tube in place. No hydronephrosis or   perinephric stranding on the right. Severe left hydronephrosis and   scattered calcifications, unchanged. Minimal left perinephric stranding   similar to the prior exam.    BLADDER: Marked bladder wall thickening similar to the prior exam.  REPRODUCTIVE ORGANS: Uterus and adnexal structures similar to prior exam.    BOWEL: No bowel obstruction. Appendix is normal. No bowel wall thickening   or pericolonic inflammatory change. Underdistended stomach which limits   evaluation of the wall thickness.  PERITONEUM: No ascites. No free air.  VESSELS:  Within normal limits.  RETROPERITONEUM: Retroperitoneal adenopathy is not significantly changed.   Left periaortic lymph node measures 2.5 cm  ABDOMINAL WALL: Within normal limits.  BONES: No aggressive osteoblastic or lytic lesion.    IMPRESSION:  Chest CT: Interval progression of cavitary pulmonary metastatic disease   with increased size and number of pulmonary nodules. No evidence of   pneumonia.    CT abdomen/pelvis: Right nephrostomy tube in place without   hydronephrosis. Stable superior left hydronephrosis. Marked irregular   bladder wall thickening, unchanged.  Progression of liver metastasis. Stable retroperitoneal adenopathy.    --- End of Report ---            SARAI DUGGAN MD; Attending Radiologist  This document has been electronically signed. Sep 20 2022  3:17AM    < end of copied text >

## 2022-10-12 NOTE — PROGRESS NOTE ADULT - PROBLEM SELECTOR PLAN 4
- Intermittent vaginal bleeding endorsed  - Seen by GYN on 08/22  and at that time Pt deferred pelvic exam and pelvic sonogram as she was worked up on previous admission and recommended to follow up outpatient at [facility]. No acute interventions at that time.   - Pt endorses that she wants to address with her oncologist for pelvic exam and for transvaginal US. Encouraged her to see a GYN oncologist and referral given.   - H&H stable.   - trend CBC

## 2022-10-12 NOTE — PROGRESS NOTE ADULT - PROBLEM SELECTOR PLAN 6
- Patient will need to follow up with her Oncologist and GYN Oncologist.  - Pt refusing discharge, 24hr notice was given, pt appealed x2 and was rejected x2  -Still refusing discharge stating she can not discharge while testing COVID+  10/10- patient appealing again d/c

## 2022-10-12 NOTE — PROGRESS NOTE ADULT - ASSESSMENT
_________________________________________________________________________________________  ========>>  M E D I C A L   A T T E N D I N G    F O L L O W  U P  N O T E  <<=========  -----------------------------------------------------------------------------------------------------    - Patient seen and examined by me earlier today.   - In summary,  JEF HOUSE is a 56y year old woman admitted with UTI  - Patient today overall doing ok, comfortable, eating fairly , pain overall controlled    pt decliing to go home while covid tests still positive       pt already off isolation in the hospital and she has been fully informed about her being cleared for DC and returning home and continuing her chemo/immuno therapy     ==================>> REVIEW OF SYSTEM <<=================    GEN: no fever, no chills, as above  RESP: no SOB, as above , off O2 , + reports occasional "blood clots" in sputum  CVS: no pain as   GI: no abdominal pain, no nausea, no more diarrhea reported    : no dysuria, no frequency.. as above , no blood , as above   Neuro: no headache, no dizziness  Derm : no itching, no rash    ==================>> PHYSICAL EXAM <<=================    GEN: A&O X 3 , NAD , comfortable, pleasant, calm , cachectic , weak appearing   HEENT: NCAT, PERRL, MMM, hearing intact  Neck: supple , no JVD appreciated  CVS: S1S2 , regular , No M/R/G appreciated  PULM: CTA B/L,  no W/R/R appreciated  ABD.: soft. non tender, non distended,  bowel sounds present  Extrem: intact pulses , no edema      nephrostomy bag / leg bag draining clear yellow urine   PSYCH : normal mood,  not anxious                              ( note written / Date of service   10-12-22 )    ==================>> MEDICATIONS <<====================    enoxaparin Injectable 40 milliGRAM(s) SubCutaneous every 24 hours  ferrous    sulfate 325 milliGRAM(s) Oral three times a day  folic acid 1 milliGRAM(s) Oral daily  multivitamin 1 Tablet(s) Oral daily  polyethylene glycol 3350 17 Gram(s) Oral two times a day  senna 2 Tablet(s) Oral at bedtime    MEDICATIONS  (PRN):  acetaminophen     Tablet .. 650 milliGRAM(s) Oral every 6 hours PRN Mild Pain (1 - 3)  traMADol 25 milliGRAM(s) Oral every 6 hours PRN Moderate Pain (4 - 6)    ==================>> VITAL SIGNS <<==================     Vital Signs Last 24 HrsT(C): 36.3 (10-12-22 @ 05:30)  T(F): 97.3 (10-12-22 @ 05:30), Max: 98.5 (10-11-22 @ 20:57)  HR: 58 (10-12-22 @ 05:30) (58 - 67)  BP: 114/46 (10-12-22 @ 05:30)  RR: 16 (10-12-22 @ 05:30) (16 - 17)  SpO2: 100% (10-12-22 @ 05:30) (100% - 100%)       ==================>> LAB AND IMAGING <<==================       no labs today    _________________________________________________________________________________  ===============>>  A S S E S S M E N T   A N D   P L A N <<===============  ------------------------------------------------------------------------------------------    · Assessment	  56 year old female from home, with PMHX of Bladder cancer (previously on chemo until 5/2022) with  Dr. Renay Trinidad, chronic hydronephrosis  R nephrostomy tube, hyperparathyroidism, bradycardia with PPM coming in for hematuria. Of note patient with hx multidrug resistant UTI, recent nephrostomy tube exchange 8/22/22.      Problem/Plan - 1:  ·  Problem: Complicated UTI (urinary tract infection).   ·  Plan: P/W Hematuria in nephrostomy drainage bag   hx of multi drug resistant UTIs   finish Oral Abx per ID plan  ID Rodriguez following  pain mgmt as above     *** + covid - 19 swab    pt with minimal symptoms, rare cough , off O2       monitor        supportive care       nutrition / hydration, IS, vitamins        no need for isolation         cleared as above      Problem/Plan - 2:  ·  Problem: Metastatic cancer.   ·  Plan: metastatic bladder CA   CT confirms progression of disease with mets to lung and liver   last chemo 05/2022  plan to continue treatment once discharged.  f/u onc as OP (  pt making appointments)     ***  vaginal bleed    appears to be related to her malignancy / mets..     pt evaluated by GYN last admission but declined Pelvic exam and sono     bleeding has lessened : monitor : OP follow up     monitor H.H     pt declining blood transfusion     ** Anemia    Iron deficiency    Folate deficiency  >> supplements as ordered  monitor     Problem/Plan - 3:  ·  Problem: Atypical chest pain.  ·  Plan: c/p flank pain that radiates to chest   monitor      Problem/Plan - 4:  ·  Problem: Hydronephrosis.  ·  Plan: s/p R nephrostomy tube exchange 08/2022  chronic stable left hydro   monitor urine output   trend BMP.     Problem/Plan - 5:  ·  Problem: Prophylactic measure.   ·  Plan: Lovenox for DVT ppx.    Discharge planning issues as above , administration is involved as well and helping in this matter     --------------------------------------------  Case discussed with pt, med team   Education given on findings and plan of care  ___________________________  H. NIEVES Ochoa.   Pager: [phone]    .

## 2022-10-12 NOTE — PROGRESS NOTE ADULT - SUBJECTIVE AND OBJECTIVE BOX
Patient is a 56y old  Female who presents with a chief complaint of Chest Pain, UTI (12 Oct 2022 12:38)      INTERVAL HPI/OVERNIGHT EVENTS: no acute events overnght    REVIEW OF SYSTEMS:  CONSTITUTIONAL: No fever, chills  ENMT:  No difficulty hearing, no change in vision  NECK: No pain or stiffness  RESPIRATORY: No cough, SOB  CARDIOVASCULAR: No chest pain, palpitations  GASTROINTESTINAL: No abdominal pain. No nausea, vomiting, or diarrhea  GENITOURINARY: No dysuria  NEUROLOGICAL: No HA  SKIN: No itching, burning, rashes, or lesions   LYMPH NODES: No enlarged glands  ENDOCRINE: No heat or cold intolerance; No hair loss  MUSCULOSKELETAL: No joint pain or swelling; No muscle, back, or extremity pain  PSYCHIATRIC: No depression, anxiety  HEME/LYMPH: No easy bruising, or bleeding gums    T(C): 36.8 (10-12-22 @ 13:14), Max: 36.9 (10-11-22 @ 20:57)  HR: 77 (10-12-22 @ 13:14) (58 - 77)  BP: 103/55 (10-12-22 @ 13:14) (100/58 - 114/46)  RR: 17 (10-12-22 @ 13:14) (16 - 17)  SpO2: 100% (10-12-22 @ 13:14) (100% - 100%)  Wt(kg): --Vital Signs Last 24 Hrs  T(C): 36.8 (12 Oct 2022 13:14), Max: 36.9 (11 Oct 2022 20:57)  T(F): 98.3 (12 Oct 2022 13:14), Max: 98.5 (11 Oct 2022 20:57)  HR: 77 (12 Oct 2022 13:14) (58 - 77)  BP: 103/55 (12 Oct 2022 13:14) (100/58 - 114/46)  BP(mean): 58 (12 Oct 2022 05:30) (58 - 58)  RR: 17 (12 Oct 2022 13:14) (16 - 17)  SpO2: 100% (12 Oct 2022 13:14) (100% - 100%)    Parameters below as of 12 Oct 2022 13:14  Patient On (Oxygen Delivery Method): room air    MEDICATIONS  (STANDING):  enoxaparin Injectable 40 milliGRAM(s) SubCutaneous every 24 hours  ferrous    sulfate 325 milliGRAM(s) Oral three times a day  folic acid 1 milliGRAM(s) Oral daily  multivitamin 1 Tablet(s) Oral daily  polyethylene glycol 3350 17 Gram(s) Oral two times a day  senna 2 Tablet(s) Oral at bedtime    MEDICATIONS  (PRN):  acetaminophen     Tablet .. 650 milliGRAM(s) Oral every 6 hours PRN Mild Pain (1 - 3)  traMADol 25 milliGRAM(s) Oral every 6 hours PRN Moderate Pain (4 - 6)      PHYSICAL EXAM:  GENERAL: NAD  EYES: clear conjunctiva; EOMI  ENMT: Moist mucous membranes  NECK: Supple, No JVD, Normal thyroid  CHEST/LUNG: Clear to auscultation bilaterally; No rales, rhonchi, wheezing, or rubs  HEART: S1, S2, Regular rate and rhythm  ABDOMEN: Soft, Nontender, Nondistended; Bowel sounds present  NEURO: Alert & Oriented X3  EXTREMITIES: No LE edema, no calf tenderness  LYMPH: No lymphadenopathy noted  SKIN: No rashes or lesions  : R neph tube with urine Clear yellow    Consultant(s) Notes Reviewed:  [x ] YES  [ ] NO  Care Discussed with Consultants/Other Providers [ x] YES  [ ] NO    LABS:    CAPILLARY BLOOD GLUCOSE      RADIOLOGY & ADDITIONAL TESTS:    Imaging Personally Reviewed:  [ x] YES  [ ] NO  < from: Xray Chest 1 View- PORTABLE-Routine (Xray Chest 1 View- PORTABLE-Routine in AM.) (10.03.22 @ 14:47) >  ACC: [id number] EXAM:  XR CHEST PORTABLE ROUTINE 1V                          PROCEDURE DATE:  10/03/2022          INTERPRETATION:  AP erect chest on October 13, 2022 at 12:36 PM. Patient   has cough and is Covid positive.    Heart size is within normal limits.    Left-sided pacemaker again noted.    Multiple pulmonary metastases are again noted.    No infiltrate.    Metastatic areas have increased somewhat in number compared to August 16.    IMPRESSION: Pulmonary metastases somewhat increased in number.    --- End of Report ---            ERIC LYNN MD; Attending Radiologist  This document has been electronically signed. Oct  3 2022  2:50PM    < end of copied text >

## 2022-10-13 NOTE — PROGRESS NOTE ADULT - SUBJECTIVE AND OBJECTIVE BOX
Patient is a 56y old  Female who presents with a chief complaint of Chest Pain, UTI (12 Oct 2022 14:39)      INTERVAL HPI/OVERNIGHT EVENTS: no acute events events overnight      REVIEW OF SYSTEMS:  CONSTITUTIONAL: No fever, chills  ENMT:  No difficulty hearing, no change in vision  NECK: No pain or stiffness  RESPIRATORY: No cough, SOB  CARDIOVASCULAR: No chest pain, palpitations  GASTROINTESTINAL: No abdominal pain. No nausea, vomiting, or diarrhea  GENITOURINARY: No dysuria  NEUROLOGICAL: No HA  SKIN: No itching, burning, rashes, or lesions   LYMPH NODES: No enlarged glands  ENDOCRINE: No heat or cold intolerance; No hair loss  MUSCULOSKELETAL: No joint pain or swelling; No muscle, back, or extremity pain  PSYCHIATRIC: No depression, anxiety  HEME/LYMPH: No easy bruising, or bleeding gums    T(C): 36.6 (10-13-22 @ 05:24), Max: 36.7 (10-12-22 @ 20:41)  HR: 66 (10-13-22 @ 05:24) (66 - 70)  BP: 122/53 (10-13-22 @ 05:24) (95/59 - 122/53)  RR: 16 (10-13-22 @ 05:24) (16 - 17)  SpO2: 100% (10-13-22 @ 05:24) (100% - 100%)  Wt(kg): --Vital Signs Last 24 Hrs  T(C): 36.6 (13 Oct 2022 05:24), Max: 36.7 (12 Oct 2022 20:41)  T(F): 97.9 (13 Oct 2022 05:24), Max: 98 (12 Oct 2022 20:41)  HR: 66 (13 Oct 2022 05:24) (66 - 70)  BP: 122/53 (13 Oct 2022 05:24) (95/59 - 122/53)  BP(mean): 65 (13 Oct 2022 05:24) (65 - 65)  RR: 16 (13 Oct 2022 05:24) (16 - 17)  SpO2: 100% (13 Oct 2022 05:24) (100% - 100%)    Parameters below as of 13 Oct 2022 05:24  Patient On (Oxygen Delivery Method): room air        PHYSICAL EXAM:  GENERAL: NAD  EYES: clear conjunctiva; EOMI  ENMT: Moist mucous membranes  NECK: Supple, No JVD, Normal thyroid  CHEST/LUNG: Clear to auscultation bilaterally; No rales, rhonchi, wheezing, or rubs  HEART: S1, S2, Regular rate and rhythm  ABDOMEN: Soft, Nontender, Nondistended; Bowel sounds present  NEURO: Alert & Oriented X3  EXTREMITIES: No LE edema, no calf tenderness  LYMPH: No lymphadenopathy noted  SKIN: No rashes or lesions  : R neph tube site CDI with clear yellow urine    Consultant(s) Notes Reviewed:  [x ] YES  [ ] NO  Care Discussed with Consultants/Other Providers [ x] YES  [ ] NO    LABS:    CAPILLARY BLOOD GLUCOSE    RADIOLOGY & ADDITIONAL TESTS:    Imaging Personally Reviewed:  [x ] YES  [ ] NO  < from: Xray Chest 1 View- PORTABLE-Routine (Xray Chest 1 View- PORTABLE-Routine in AM.) (10.03.22 @ 14:47) >    ACC: [id number] EXAM:  XR CHEST PORTABLE ROUTINE 1V                          PROCEDURE DATE:  10/03/2022          INTERPRETATION:  AP erect chest on October 13, 2022 at 12:36 PM. Patient   has cough and is Covid positive.    Heart size is within normal limits.    Left-sided pacemaker again noted.    Multiple pulmonary metastases are again noted.    No infiltrate.    Metastatic areas have increased somewhat in number compared to August 16.    IMPRESSION: Pulmonary metastases somewhat increased in number.    --- End of Report ---            ERIC LYNN MD; Attending Radiologist  This document has been electronically signed. Oct  3 2022  2:50PM    < end of copied text >

## 2022-10-13 NOTE — PROGRESS NOTE ADULT - PROBLEM SELECTOR PLAN 1
---------------resolved------------  - complicated UTI  - p/w hematuria in nephrostomy bag  - Has Hx of multi drug resistant UTIs  -UCX positive for enterococcus   - Completed Abx 9/30

## 2022-10-13 NOTE — PROGRESS NOTE ADULT - PROBLEM SELECTOR PLAN 4
Patient would like a cb from ZELALEM Platt; pt did not disclose what call was in reference to. - Intermittent vaginal bleeding endorsed  - Seen by GYN on 08/22  and at that time Pt deferred pelvic exam and pelvic sonogram as she was worked up on previous admission and recommended to follow up outpatient at [facility]. No acute interventions at that time.   - Pt endorses that she wants to address with her oncologist for pelvic exam and for transvaginal US. Encouraged her to see a GYN oncologist and referral given.   - H&H stable.   - trend CBC

## 2022-10-13 NOTE — PROGRESS NOTE ADULT - ASSESSMENT
( Note written / Date of service 10-13-22 )    ==================>> MEDICATIONS <<====================    enoxaparin Injectable 40 milliGRAM(s) SubCutaneous every 24 hours  ferrous    sulfate 325 milliGRAM(s) Oral three times a day  folic acid 1 milliGRAM(s) Oral daily  multivitamin 1 Tablet(s) Oral daily  polyethylene glycol 3350 17 Gram(s) Oral two times a day  senna 2 Tablet(s) Oral at bedtime    MEDICATIONS  (PRN):  acetaminophen     Tablet .. 650 milliGRAM(s) Oral every 6 hours PRN Mild Pain (1 - 3)  traMADol 25 milliGRAM(s) Oral every 6 hours PRN Moderate Pain (4 - 6)    ___________  Active diet:  ___________________    ==================>> VITAL SIGNS <<==================    Vital Signs Last 24 HrsT(C): 37.1 (10-13-22 @ 14:05)  T(F): 98.7 (10-13-22 @ 14:05), Max: 98.7 (10-13-22 @ 14:05)  HR: 73 (10-13-22 @ 14:05) (66 - 73)  BP: 96/54 (10-13-22 @ 14:05)  RR: 16 (10-13-22 @ 14:05) (16 - 17)  SpO2: 100% (10-13-22 @ 14:05) (100% - 100%)      CAPILLARY BLOOD GLUCOSE         ==================>> LAB AND IMAGING <<==================             WBC count:   9.79 <<==   Hemoglobin:   8.7 <<==  platelets:  398 <==    Creatinine:  0.99  <<==  Sodium:   137  <==       AST:          20 <==      ALT:        15  <==      AP:        95  <=     Bili:        0.1  <=    ____________________________    M I C R O B I O L O G Y :      COVID-19 PCR: Detected (10-12-22 @ 06:51)  COVID-19 PCR: Detected (10-10-22 @ 06:47)  COVID-19 PCR: Detected (10-07-22 @ 03:03)     _________________________________________________________________________________________  ========>>  M E D I C A L   A T T E N D I N G    F O L L O W  U P  N O T E  <<=========  -----------------------------------------------------------------------------------------------------    - Patient seen and examined by me earlier today.   - In summary,  JEF HOUSE is a 56y year old woman admitted with UTI  - Patient today overall doing ok, comfortable, eating fairly , pain overall controlled    pt decliing to go home while covid tests still positive       pt already off isolation in the hospital and she has been fully informed about her being cleared for DC and returning home and continuing her chemo/immuno therapy     ==================>> REVIEW OF SYSTEM <<=================    GEN: no fever, no chills, as above  RESP: no SOB, as above , off O2 , + reports occasional "blood clots" in sputum  CVS: no pain as   GI: no abdominal pain, no nausea, no more diarrhea reported    : no dysuria, no frequency.. as above , no blood , as above   Neuro: no headache, no dizziness  Derm : no itching, no rash    ==================>> PHYSICAL EXAM <<=================    GEN: A&O X 3 , NAD , comfortable, pleasant, calm , cachectic , weak appearing   HEENT: NCAT, PERRL, MMM, hearing intact  Neck: supple , no JVD appreciated  CVS: S1S2 , regular , No M/R/G appreciated  PULM: CTA B/L,  no W/R/R appreciated  ABD.: soft. non tender, non distended,  bowel sounds present  Extrem: intact pulses , no edema      nephrostomy bag / leg bag draining clear yellow urine   PSYCH : normal mood,  not anxious                               ( Note written / Date of service 10-13-22 )    ==================>> MEDICATIONS <<====================    enoxaparin Injectable 40 milliGRAM(s) SubCutaneous every 24 hours  ferrous    sulfate 325 milliGRAM(s) Oral three times a day  folic acid 1 milliGRAM(s) Oral daily  multivitamin 1 Tablet(s) Oral daily  polyethylene glycol 3350 17 Gram(s) Oral two times a day  senna 2 Tablet(s) Oral at bedtime    MEDICATIONS  (PRN):  acetaminophen     Tablet .. 650 milliGRAM(s) Oral every 6 hours PRN Mild Pain (1 - 3)  traMADol 25 milliGRAM(s) Oral every 6 hours PRN Moderate Pain (4 - 6)      ==================>> VITAL SIGNS <<==================    Vital Signs Last 24 HrsT(C): 37.1 (10-13-22 @ 14:05)  T(F): 98.7 (10-13-22 @ 14:05), Max: 98.7 (10-13-22 @ 14:05)  HR: 73 (10-13-22 @ 14:05) (66 - 73)  BP: 96/54 (10-13-22 @ 14:05)  RR: 16 (10-13-22 @ 14:05) (16 - 17)  SpO2: 100% (10-13-22 @ 14:05) (100% - 100%)       ==================>> LAB AND IMAGING <<==================         no labs today    _________________________________________________________________________________  ===============>>  A S S E S S M E N T   A N D   P L A N <<===============  ------------------------------------------------------------------------------------------    · Assessment	  56 year old female from home, with PMHX of Bladder cancer (previously on chemo until 5/2022) with  Dr. Renay Trinidad, chronic hydronephrosis  R nephrostomy tube, hyperparathyroidism, bradycardia with PPM coming in for hematuria. Of note patient with hx multidrug resistant UTI, recent nephrostomy tube exchange 8/22/22.      Problem/Plan - 1:  ·  Problem: Complicated UTI (urinary tract infection).   ·  Plan: P/W Hematuria in nephrostomy drainage bag   hx of multi drug resistant UTIs   finish Oral Abx per ID plan  ID Rodriguez following  pain mgmt as above     *** + covid - 19 swab    pt with minimal symptoms, rare cough , off O2       monitor        supportive care       nutrition / hydration, IS, vitamins        no need for isolation         cleared as above      Problem/Plan - 2:  ·  Problem: Metastatic cancer.   ·  Plan: metastatic bladder CA   CT confirms progression of disease with mets to lung and liver   last chemo 05/2022  plan to continue treatment once discharged.  f/u onc as OP (  pt making appointments)     ***  vaginal bleed    appears to be related to her malignancy / mets..     pt evaluated by GYN last admission but declined Pelvic exam and sono     bleeding has lessened : monitor : OP follow up     monitor H.H     pt declining blood transfusion     ** Anemia    Iron deficiency    Folate deficiency  >> supplements as ordered  monitor     Problem/Plan - 3:  ·  Problem: Atypical chest pain.  ·  Plan: c/p flank pain that radiates to chest   monitor      Problem/Plan - 4:  ·  Problem: Hydronephrosis.  ·  Plan: s/p R nephrostomy tube exchange 08/2022  chronic stable left hydro   monitor urine output   trend BMP.     Problem/Plan - 5:  ·  Problem: Prophylactic measure.   ·  Plan: Lovenox for DVT ppx.    Discharge planning issues as above , administration is involved as well and helping in this matter     --------------------------------------------  Case discussed with pt, med team   Education given on findings and plan of care  ___________________________  H. NIEVES Ochoa.   Pager: [phone]    .

## 2022-10-13 NOTE — PROGRESS NOTE ADULT - NUTRITIONAL ASSESSMENT
This patient has been assessed with a concern for Malnutrition and has been determined to have a diagnosis/diagnoses of Severe protein-calorie malnutrition and Underweight (BMI < 19).    This patient is being managed with:   Diet Regular-  Entered: Sep 20 2022  6:17AM    
This patient has been assessed with a concern for Malnutrition and has been determined to have a diagnosis/diagnoses of Severe protein-calorie malnutrition and Underweight (BMI < 19).    This patient is being managed with:   Diet Regular-  Entered: Sep 20 2022  6:17AM      This patient has been assessed with a concern for Malnutrition and has been determined to have a diagnosis/diagnoses of Severe protein-calorie malnutrition and Underweight (BMI < 19).    This patient is being managed with:   Diet Regular-  Entered: Sep 20 2022  6:17AM    
This patient has been assessed with a concern for Malnutrition and has been determined to have a diagnosis/diagnoses of Severe protein-calorie malnutrition and Underweight (BMI < 19).    This patient is being managed with:   Diet Regular-  Entered: Sep 20 2022  6:17AM    
This patient has been assessed with a concern for Malnutrition and has been determined to have a diagnosis/diagnoses of Severe protein-calorie malnutrition and Underweight (BMI < 19).    This patient is being managed with:   Diet Regular-  Entered: Sep 20 2022  6:17AM    
This patient has been assessed with a concern for Malnutrition and has been determined to have a diagnosis/diagnoses of Severe protein-calorie malnutrition and Underweight (BMI < 19).    This patient is being managed with:   Diet Regular-  Entered: Sep 20 2022  6:17AM      This patient has been assessed with a concern for Malnutrition and has been determined to have a diagnosis/diagnoses of Severe protein-calorie malnutrition and Underweight (BMI < 19).    This patient is being managed with:   Diet Regular-  Entered: Sep 20 2022  6:17AM    
This patient has been assessed with a concern for Malnutrition and has been determined to have a diagnosis/diagnoses of Severe protein-calorie malnutrition and Underweight (BMI < 19).    This patient is being managed with:   Diet Regular-  Entered: Sep 20 2022  6:17AM    

## 2022-10-13 NOTE — PROGRESS NOTE ADULT - PROBLEM SELECTOR PLAN 2
with metastasis to liver and lungs confirmed by CT  -Last chemo 5/20/22  -Plan to cont tx at [facility] with Dr. Hoyt  -cont Tramadol

## 2022-10-13 NOTE — PROGRESS NOTE ADULT - ASSESSMENT
56 year old female from home, with PMHX of Bladder cancer (previously on chemo until 5/2022) with  Dr. Renay Trinidad, chronic hydronephrosis  R nephrostomy tube, hyperparathyroidism, bradycardia with PPM coming in for hematuria. Of note patient with hx multidrug resistant UTI, recent nephrostomy tube exchange 8/22/22. Started on Cefepime based on previous UCX- ID Rodriguez consulted  CT A/P: Right nephrostomy tube in place without hydronephrosis. Stable superior left hydronephrosis. Marked irregular bladder wall thickening, unchanged. Progression of liver metastasis.  Urine culture grew Enterococcus specie. Hospital course c/b COVID + pcr on 09/21. Patient has been medically optimized and completed antibiotic course on 09/30 but patient has been refusing discharge and appealed discharge twice, and denied twice.   Pt completed isolation, optimized for d/c. pt is refusing discharge

## 2022-10-13 NOTE — PROGRESS NOTE ADULT - PROBLEM SELECTOR PLAN 6
- Patient will need to follow up with her Oncologist and GYN Oncologist.  - Pt refusing discharge, 24hr notice was given, pt appealed x3 and was rejected x2  -Still refusing discharge stating she can not discharge while testing COVID+

## 2022-10-13 NOTE — PROGRESS NOTE ADULT - PROBLEM SELECTOR PLAN 3
chronic right nephrostomy tube exchanged 8/22  -CT A/P shows Stable superior left hydronephrosis. Marked irregular bladder wall thickening, unchanged.

## 2022-10-14 NOTE — DISCHARGE NOTE NURSING/CASE MANAGEMENT/SOCIAL WORK - NSDCPEFALRISK_GEN_ALL_CORE
For information on Fall & Injury Prevention, visit: [email or web address] OR  [email or web address] OR  https://www.cdc.gov/steadi/patient.html

## 2022-10-14 NOTE — DISCHARGE NOTE NURSING/CASE MANAGEMENT/SOCIAL WORK - PATIENT PORTAL LINK FT
You can access the FollowMyHealth Patient Portal offered by [facility] by registering at the following website: [email or web address]. By joining [employer]’s FollowMyHealth portal, you will also be able to view your health information using other applications (apps) compatible with our system.

## 2022-10-14 NOTE — PROGRESS NOTE ADULT - REASON FOR ADMISSION
Chest Pain, UTI

## 2022-10-14 NOTE — CHART NOTE - NSCHARTNOTEFT_GEN_A_CORE
56 year old female admitted for complicated UTI. Pt refusing to discharge.     Problem/Plan - 1:  ·  Problem: Complicated UTI (urinary tract infection).   ·  Plan: P/W Hematuria in nephrostomy drainage bag   hx of multi drug resistant UTIs and hx of   Urine Cx growing Enterococcus specie  Sensitivity discussed with Dr. Rodriguez  Patient refuses IV ABX with multiple frequencies   plan to start Augmentin 500mg Q8 today 9/26  requesting to be kept in hospital to be monitored   FIFI Rodriguez following.    Problem/Plan - 2:  ·  Problem: Metastatic cancer.   ·  Plan: metastatic bladder CA   CT confirms progression of disease with mets to lung and liver   last chemo 05/2022  plan to continue treatment once discharged  Plan to discharge on Tramadol 50 mg Q 6hrs PRN.    Problem/Plan - 3:  ·  Problem: Atypical chest pain.   ·  Plan: c/p flank pain that radiates to chest   Troponin neg x2  EKG with possible ischemia   Dr Rosario consulted.    Problem/Plan - 4:  ·  Problem: Hydronephrosis.   ·  Plan: s/p R nephrostomy tube exchange 08/2022  chronic stable left hydro   monitor urine output   trend BMP.    Problem/Plan - 5:  ·  Problem: Constipation.   ·  Plan: continues to complain of feels of incomplete evacuation   continue bowel regimen    Stool count.    Problem/Plan - 6:  ·  Problem: Postmenopausal vaginal bleeding.   ·  Plan: Intermittent vaginal bleeding endorsed but none at this time.   Seen by GYN on 08/22  and at that time Pt deferred pelvic exam and pelvic sonogram as she was worked up on previous admission and recommended to follow up outpatient at [facility]. No acute interventions at that time.   Pt endorses that she wants to address with her oncologist for pelvic exam and for transvaginal US. Encouraged her to see a GYN oncologist and referral given.   - 9/28, Pt c/o of vag bleed again today  - start CBC and type and screen ordered, Night NP to follow up cbc      Problem/Plan - 7:  ·  Problem: Prophylactic measure.   ·  Plan: Lovenox for DVT ppx  Monitor CBC.    Problem/Plan - 8:  ·  Problem: Discharge planning issues.   ·  Plan: 24 hour discharge notice given  wants to stay today to monitor for adverse reaction.   Patient also c/o pain not being well managed. Started on Tramadol 50 mg Q 6 PRN.   SW to follow for safe discharge as patient endorses that she does not have anyone to care for her at home although she states that she lives with 2 family members.  Patient will need to follow up with her Oncologist and GYN Oncologist.
Assessment:   56yFemalePatient is a 56y old  Female who presents with a chief complaint of Chest Pain, UTI (26 Sep 2022 14:45)      Factors impacting intake: [ ] none [ ] nausea  [ ] vomiting [ ] diarrhea [ ] constipation  [ ]chewing problems [ ] swallowing issues  [x ] other: visited patient in room. reports consuming meals but observed dinner tray, not that much. Has some diarrhea.     Diet Presciption: Diet, Regular (09-20-22 @ 06:17)    Intake: inadequate     Current Weight: none new   % Weight Change    Pertinent Medications: MEDICATIONS  (STANDING):  amoxicillin 250 milliGRAM(s) Oral every 8 hours  enoxaparin Injectable 40 milliGRAM(s) SubCutaneous every 24 hours  lactobacillus acidophilus 1 Tablet(s) Oral daily  polyethylene glycol 3350 17 Gram(s) Oral two times a day  senna 2 Tablet(s) Oral at bedtime  sodium chloride 0.9%. 1000 milliLiter(s) (80 mL/Hr) IV Continuous <Continuous>    MEDICATIONS  (PRN):  acetaminophen     Tablet .. 650 milliGRAM(s) Oral every 6 hours PRN Mild Pain (1 - 3)  traMADol 50 milliGRAM(s) Oral every 6 hours PRN Moderate Pain (4 - 6)    Pertinent Labs: 09-26 Na137 mmol/L Glu 177 mg/dL<H> K+ 3.8 mmol/L Cr  1.06 mg/dL BUN 15 mg/dL 09-24 Phos 2.9 mg/dL 09-24 Alb 2.5 g/dL<L>     CAPILLARY BLOOD GLUCOSE      POCT Blood Glucose.: 73 mg/dL (26 Sep 2022 08:37)    Skin: No pressure injury     Estimated Needs:   [ x] no change since previous assessment  [ ] recalculated:     Previous Nutrition Diagnosis:   [ ] Inadequate Energy Intake [ ]Inadequate Oral Intake [ ] Excessive Energy Intake   [ ] Underweight [ ] Increased Nutrient Needs [ ] Overweight/Obesity   [ ] Altered GI Function [ ] Unintended Weight Loss [ ] Food & Nutrition Related Knowledge Deficit [x ] Malnutrition , severe     Nutrition Diagnosis is [x ] ongoing  [ ] resolved [ ] not applicable     New Nutrition Diagnosis: [ ] not applicable       Interventions:   Recommend  [ ] Change Diet To:  [ ] Nutrition Supplement  [ ] Nutrition Support  [ x] Other: provide Regular diet , monitor improved tolerance     Monitoring and Evaluation:   [ ] PO intake [ x ] Tolerance to diet prescription [ x ] weights [ x ] labs[ x ] follow up per protocol  [ ] other:
CBC results reviewed. H/H stable: 8.7/29.5. NP went over results with patient. All questions and concerns answered.
EVENT: Approached patient as directed by ID Dr Rodriguez to inform her that her antibiotics will be switched from Cefepime to oral Linezolid 600mg Q12 hours based on her urine culture result, as pt was not agreeable to take unasyn due to Q6 hour frequency during her early conversation with pt today. Patient refused to start Linezolid regimen. She claims that "600 mg is too much, it might damage my kidneys"  I attempted to educate pt that her current ABX regimen does not cover enteroccocus bacteria as resulted in her urine culture. However, patient was adamant and did not want to switch to Linezolid. Notified Dr Rodriguez of above conversation with patient.     OBJECTIVE:  Vital Signs Last 24 Hrs  T(C): 36.6 (22 Sep 2022 13:45), Max: 36.7 (22 Sep 2022 05:27)  T(F): 97.9 (22 Sep 2022 13:45), Max: 98.1 (22 Sep 2022 05:27)  HR: 61 (22 Sep 2022 13:45) (61 - 66)  BP: 102/60 (22 Sep 2022 13:45) (101/65 - 105/60)  BP(mean): --  RR: 18 (22 Sep 2022 13:45) (18 - 18)  SpO2: 100% (22 Sep 2022 13:45) (100% - 100%)    Parameters below as of 22 Sep 2022 13:45  Patient On (Oxygen Delivery Method): room air        FOCUSED PHYSICAL EXAM:  Neuro: awake, alert, oriented x 3. No neuro deficit  Cardiovascular: Pulses +2 B/L in lower and upper extremities, HR regular, BP stable, No edema.  Respiratory: Respirations regular, unlabored, breath sounds clear B/L.   GI: Abdomen soft, non-tender, positive bowel sounds.  : no bladder distention noted. No complaints at this time.  Skin: Dry, intact, no bruising, no diaphoresis.    I&O's      LABS:                        8.7    7.47  )-----------( 303      ( 22 Sep 2022 08:28 )             29.3     09-22    139  |  105  |  20<H>  ----------------------------<  111<H>  4.2   |  25  |  0.94    Ca    10.2      22 Sep 2022 08:28            ASSESSMENT:  A/P  This is a 56 year old female from home, with PMH of Anemia, Asthma, Bladder cancer (previously on chemo until 5/2022) with  Dr. Renay Trinidad, R nephrostomy tube, hyperparathyroidism, bradycardia with PPM coming in for chest pain, hematuria and diarrhea.         FOLLOW UP / RESULT:
EVENT: Primary RN called provider to relay patient report of nausea and vomiting    HPI:  This is a 56 year old female from home, with PMH of Anemia, Asthma, Bladder cancer, R nephrostomy tube, hyperparathyroidism, bradycardia with PPM coming in for chest pain, hematuria and diarrhea. Pt states that chest pain started 2 days ago, was sharp in nature, 7/10, relieved with tylenol. Also noted with hematuria in her nephrostomy bag.  Hx multidrug resistant UTI, recent nephrostomy tube exchange 8/22/22. Started on Cefepime based on previous UCX.      OBJECTIVE:  Vital Signs Last 24 Hrs  T(C): 36.7 (13 Oct 2022 20:40), Max: 37.1 (13 Oct 2022 14:05)  T(F): 98.1 (13 Oct 2022 20:40), Max: 98.7 (13 Oct 2022 14:05)  HR: 69 (13 Oct 2022 20:40) (66 - 73)  BP: 106/44 (13 Oct 2022 20:40) (96/54 - 122/53)  BP(mean): 63 (13 Oct 2022 14:05) (63 - 65)  RR: 16 (13 Oct 2022 20:40) (16 - 16)  SpO2: 100% (13 Oct 2022 20:40) (100% - 100%)    Parameters below as of 13 Oct 2022 20:40  Patient On (Oxygen Delivery Method): room air    PHYSICAL EXAM:  Neuro: Awake and alert, oriented to person, place, and time  Cardiovascular: + S1, S2, no murmurs, rubs, or bruits  Respiratory: clear to auscultation bilaterally with good air entry   GI: Abdomen soft, non-tender, bowel sounds present   : Non distended;   Skin: warm and dry; no rash    ASSESSMENT/PROBLEM: 57 y/o woman p/w hematuria and UTI, now c/o nausea and vomiting, possible due to metastatic bladder CA vs antibiotic side effects in setting of UTI. Examined at bedside, abd soft nt/nd, non-toxic appearing.       PLAN: Zofran 4mg PO    FOLLOW UP / RESULT: Primary RN will report any further episodes of nausea, vomiting, or associated or worsening GI symptoms.
Interval HPI: No acute medical events overnight     OBJECTIVE:  Vital Signs Last 24 Hrs  T(C): 36.7 (20 Sep 2022 15:19), Max: 36.8 (20 Sep 2022 03:02)  T(F): 98 (20 Sep 2022 15:19), Max: 98.3 (20 Sep 2022 03:02)  HR: 80 (20 Sep 2022 15:19) (66 - 80)  BP: 125/69 (20 Sep 2022 15:19) (111/69 - 133/78)  BP(mean): --  RR: 17 (20 Sep 2022 15:19) (16 - 17)  SpO2: 96% (20 Sep 2022 15:19) (96% - 100%)    Parameters below as of 20 Sep 2022 15:19  Patient On (Oxygen Delivery Method): room air      PHYSICAL EXAM:   GENERAL: NAD  HEAD:  Atraumatic, Normocephalic  EYES: EOMI, PERRLA, conjunctiva and sclera clear  ENT: Moist mucous membranes  NECK: Supple, No JVD  CHEST/LUNG: Clear to auscultation bilaterally; No rales, rhonchi, wheezing, or rubs. Unlabored respirations  HEART: Regular rate and rhythm; No murmurs, rubs, or gallops  ABDOMEN: +R. nephrostomy tube in place, BSx4; Soft, nontender, nondistended  EXTREMITIES:  2+ Peripheral Pulses, brisk capillary refill. No clubbing, cyanosis, or edema  NERVOUS SYSTEM:  A&Ox3, no focal deficits   SKIN: pale No rashes or lesions    LABS:                        9.2    8.52  )-----------( 359      ( 20 Sep 2022 05:30 )             31.0     09-20    138  |  106  |  15  ----------------------------<  88  4.0   |  23  |  1.01    Ca    10.5      20 Sep 2022 05:30  Phos  3.0     09-20  Mg     2.1     09-20    TPro  8.4<H>  /  Alb  2.8<L>  /  TBili  0.1<L>  /  DBili  x   /  AST  19  /  ALT  11  /  AlkPhos  99  09-19  IMGAGING:    ASSESSMENT: #Hematuria  #Chronic hydronephrosis with nephrostomy tube   #Metastatic bladder CA  #Diarrhea  #Multidrug resistant UTI HX     HPI:  56 year old female from home, with PMHX of Bladder cancer (previously on chemo until 5/2022) with  Dr. Renay Trinidad, chronic hydronephrosis  R nephrostomy tube, hyperparathyroidism, bradycardia with PPM coming in for hematuria. Of note patient with hx multidrug resistant UTI, started on Cefepime based on previous UCX- ID Rodriguez consulted.  CT A/P: Right nephrostomy tube in place without hydronephrosis. Stable superior left hydronephrosis. Marked irregular bladder wall thickening, unchanged. Progression of liver metastasis. Patient opts for continued treatment of with chemo and will resume once discharged    PLAN: trend CBC   continue cefepime  F.U ID reccs  admit to medicine
Reassessment:     Patient is a 56y old  Female who presents with a chief complaint of Chest Pain, UTI (03 Oct 2022 17:01)      Factors impacting intake: [ ] none [ ] nausea  [ ] vomiting [ ] diarrhea [ ] constipation  [ ]chewing problems [ ] swallowing issues  [ X] other: Complicated UTI, metastatic bladder cancer, s/p R nephrostomy tube exchange 2022, COVID+    Diet Prescription: Diet, Regular (22 @ 06:17)    Intake: Patient in isolation, observed through glass door, pt. caleb drawn noted, d/w PCA/nursing, pt. with fair to poor po intake, consuming <30-50% of meals & requesting all meals to be "heated up on the floor via microwave as pt. likes her meals very hot", had BM in am received laxatives as per orders, OB-GYN following, on pain meds, rec. c/w diet as ordered. RD available.       Daily Weight in k.3 (28 Sep 2022 05:16)    % Weight Change: slight wt. gain possible due to fluid shifts?    Pertinent Medications: MEDICATIONS  (STANDING):  enoxaparin Injectable 40 milliGRAM(s) SubCutaneous every 24 hours  lactobacillus acidophilus 1 Tablet(s) Oral daily  polyethylene glycol 3350 17 Gram(s) Oral two times a day  senna 2 Tablet(s) Oral at bedtime  sodium chloride 0.9%. 1000 milliLiter(s) (80 mL/Hr) IV Continuous <Continuous>  traMADol 25 milliGRAM(s) Oral every 6 hours    MEDICATIONS  (PRN):  acetaminophen     Tablet .. 650 milliGRAM(s) Oral every 6 hours PRN Mild Pain (1 - 3)    Pertinent Labs: 10-03 Na138 mmol/L Glu 83 mg/dL K+ 4.0 mmol/L Cr  0.85 mg/dL BUN 17 mg/dL 10-02 Alb 2.1 g/dL<L>     CAPILLARY BLOOD GLUCOSE        Skin: Intact    Estimated Needs:   [ ] no change since previous assessment  [ ] recalculated:     Previous Nutrition Diagnosis:   [ ] Inadequate Energy Intake [ ]Inadequate Oral Intake [ ] Excessive Energy Intake   [ ] Underweight [ ] Increased Nutrient Needs [ ] Overweight/Obesity   [ ] Altered GI Function [ ] Unintended Weight Loss [ ] Food & Nutrition Related Knowledge Deficit [Severe ] Malnutrition     Nutrition Diagnosis is [X ] ongoing  [ ] resolved [ ] not applicable     New Nutrition Diagnosis: [ ] not applicable     Interventions: To meet nutrition needs     Recommend  [ ] Change Diet To:  [X ] Nutrition Supplement: Pt. declined nutrition supplements   [ ] Nutrition Support  [X ] Other: Nursing to continue with meal set up and encouragement    Monitoring and Evaluation:   [ ] PO intake [ x ] Tolerance to diet prescription [ x ] weights [ x ] labs[ x ] follow up per protocol  [ ] other:
Reassessment:     Patient is a 56y old  Female who presents with a chief complaint of Chest Pain, UTI (11 Oct 2022 09:51)      Factors impacting intake: [ ] none [ ] nausea  [ ] vomiting [ ] diarrhea [ ] constipation  [ ]chewing problems [ ] swallowing issues  [ X] other: Right nephrostomy tube, Bladder cancer w/mets, postmenopausal bleeding, COVID infection    Diet Prescription: Diet, Regular (22 @ 06:17)    Intake: Patient visited, alert & awake with "mild pain", states "meals are fine & eating as much as she can", also in contact with the Kitchen/Diet Tech in regards to daily menu selections, per flow sheets intake 51-75%, presently stated no nutrition concerns or question, encourage po intake w/meal set up, rec. c/w diet as ordered. RD available.     Daily Weight in k.6 (05 Oct 2022 05:18)    % Weight Change: slight weight gain possible fluid shifts?    Pertinent Medications: MEDICATIONS  (STANDING):  enoxaparin Injectable 40 milliGRAM(s) SubCutaneous every 24 hours  ferrous    sulfate 325 milliGRAM(s) Oral three times a day  folic acid 1 milliGRAM(s) Oral daily  multivitamin 1 Tablet(s) Oral daily  polyethylene glycol 3350 17 Gram(s) Oral two times a day  senna 2 Tablet(s) Oral at bedtime    MEDICATIONS  (PRN):  acetaminophen     Tablet .. 650 milliGRAM(s) Oral every 6 hours PRN Mild Pain (1 - 3)  traMADol 25 milliGRAM(s) Oral every 6 hours PRN Moderate Pain (4 - 6)    Pertinent Labs: 10-08 Na137 mmol/L Glu 102 mg/dL<H> K+ 4.5 mmol/L Cr  0.99 mg/dL BUN 24 mg/dL<H> 10- Alb 2.8 g/dL<L>     CAPILLARY BLOOD GLUCOSE    Skin: intact    Estimated Needs:   [X ] no change since previous assessment  [ ] recalculated:     Previous Nutrition Diagnosis:   [ ] Inadequate Energy Intake [ ]Inadequate Oral Intake [ ] Excessive Energy Intake   [ ] Underweight [ ] Increased Nutrient Needs [ ] Overweight/Obesity   [ ] Altered GI Function [ ] Unintended Weight Loss [ ] Food & Nutrition Related Knowledge Deficit [Severe] Malnutrition     Nutrition Diagnosis is [X ] ongoing  [ ] resolved [ ] not applicable     Interventions: To meet nutrition needs     Recommend  [ ] Change Diet To:  [ X Nutrition Supplement: Declined   [ ] Nutrition Support  [X ] Other: C/w MVI/minerals/Folic Acid as ordered     Monitoring and Evaluation:   [X] PO intake [ x ] Tolerance to diet prescription [ x ] weights [ x ] labs[ x ] follow up per protocol  [ ] other:
called by RN, pt c/o chest pain, stat ekg ordered, endorsed to night Np to follow up.
EVENT: Chest pain        OBJECTIVE:  Vital Signs Last 24 Hrs  T(C): 36.4 (08 Oct 2022 13:35), Max: 36.8 (07 Oct 2022 20:41)  T(F): 97.6 (08 Oct 2022 13:35), Max: 98.3 (07 Oct 2022 20:41)  HR: 76 (08 Oct 2022 16:49) (66 - 76)  BP: 101/52 (08 Oct 2022 16:49) (101/52 - 116/59)  BP(mean): 71 (08 Oct 2022 13:35) (71 - 73)  RR: 18 (08 Oct 2022 16:49) (16 - 18)  SpO2: 100% (08 Oct 2022 16:49) (98% - 100%)    Parameters below as of 08 Oct 2022 16:49  Patient On (Oxygen Delivery Method): room air        ASSESSMENT:  HPI:  This is a 56 year old female from home, with PMH of Anemia, Asthma, Bladder cancer (previously on chemo until 5/2022) with  Dr. Renay Trinidad, R nephrostomy tube, hyperparathyroidism, bradycardia with PPM coming in for chest pain, hematuria and diarrhea. Pt states that chest pain started 2 days ago, was sharp in nature, 7/10, relieved with tylenol. She Also noticed that she had hematuria in her nephrostomy bag and a clot was passed into it. She was recently at [facility] for a UTI and was given 2g of ampicillin, after which she developed diarrhea. Additionally she has left flank pain now.     In ED v/s: /70, HR 66, RR 16, T 98.3    CT A/P: Right nephrostomy tube in place without hydronephrosis. Stable superior left hydronephrosis. Marked irregular bladder wall thickening, unchanged. Progression of liver metastasis.  (20 Sep 2022 04:50)      PLAN: Patient seen and examined at bedside. Patient anxious, tearful, cooperative. Patient complain of chest pain, anxiety, palpitations, weakness. Patient VS and EKG unremarkable. Patient complain of bleeding every time she goes to bathroom. Patient instructed to not flush toilet so RN can assess blood characteristics and quantity. Patient refused blood transfusion in past, however willing received blood depending on Hemoglobin level is less than 7. Medical covering attending aware. Patient encouraged to verbalize concerns and emotional support given, patient thankful and grateful to NP.   - chest pain likely chronic atypical chest pain- however due to history of anemia, r/o symptomatic anemia needing transfusion. Will hold troponin and D-Dimer as patient EKG unremarkable, and false positive result of D-Dimer due to malignancy. If patient VS unstable consider ordering as appropriate.   - f/u CBC/CMP/T+S  - transfuse PRBC as appropriate, if patient agrees.
Patient endorsed she has been updating her sister on plan of care.

## 2022-10-14 NOTE — PROGRESS NOTE ADULT - ASSESSMENT
M E D I C A L   A T T E N D I N G    F O L L O W    U P   N O T E  (10-14-22 )                                     ------------------------------------------------------------------------------------------------    patient evaluated by me, case discussed with team, chart, medications, and physical exam reviewed, labs / tests  and vitals reviewed by me, as bellow.   Patient is stable for discharge today.  Patient to follow up with  Oncologist, GYN  See discharge document for full note.  [Greater than 35 min spent for these services. ]                             ( note written / Date of service  10-14-22 )    ==================>> MEDICATIONS <<====================    enoxaparin Injectable 40 milliGRAM(s) SubCutaneous every 24 hours  ferrous    sulfate 325 milliGRAM(s) Oral three times a day  folic acid 1 milliGRAM(s) Oral daily  multivitamin 1 Tablet(s) Oral daily  polyethylene glycol 3350 17 Gram(s) Oral two times a day  senna 2 Tablet(s) Oral at bedtime    MEDICATIONS  (PRN):  acetaminophen     Tablet .. 650 milliGRAM(s) Oral every 6 hours PRN Mild Pain (1 - 3)  traMADol 25 milliGRAM(s) Oral every 6 hours PRN Moderate Pain (4 - 6)    ___________  Active diet:  ___________________    ==================>> VITAL SIGNS <<==================    T(C): 36.2 (10-14-22 @ 05:18), Max: 37.1 (10-13-22 @ 14:05)  HR: 74 (10-14-22 @ 05:18) (69 - 74)  BP: 102/49 (10-14-22 @ 05:18) (96/54 - 106/44)  BP(mean): 61 (10-14-22 @ 05:18)  RR: 16 (10-14-22 @ 05:18) (16 - 16)  SpO2: 100% (10-14-22 @ 05:18) (100% - 100%)        ==================>> LAB AND IMAGING <<==================       no labs today     ____________________________    M I C R O B I O L O G Y :    COVID-19 PCR: NotDetec (10-14-22 @ 06:00)  COVID-19 PCR: Detected (10-12-22 @ 06:51)  COVID-19 PCR: Detected (10-10-22 @ 06:47)

## 2022-10-14 NOTE — CHART NOTE - NSCHARTNOTESELECT_GEN_ALL_CORE
Chest Pain/Event Note
Family update/Event Note
CBC results/Event Note
EHLD NP note
Event Note
Nausea/Event Note
Nutrition Services

## 2022-10-17 NOTE — ED PROVIDER NOTE - ATTENDING CONTRIBUTION TO CARE
I performed a history and physical exam of the patient and discussed their management with the resident and /or advanced care provider. I reviewed the resident and /or ACP's note and agree with the documented findings and plan of care. My medical decision making and observations are found above.  Lungs clear, RR increased a little. abd soft. Hip painfull to move I performed a history and physical exam of the patient and discussed their management with the resident and /or advanced care provider. I reviewed the resident and /or ACP's note and agree with the documented findings and plan of care. My medical decision making and observations are found above.  Lungs clear, RR increased a little. abd soft. Hip painful to move

## 2022-10-17 NOTE — ED PROVIDER NOTE - PROGRESS NOTE DETAILS
ZHANG OLIVERA - HELENES. Pain better controlled. Had long discussion about Iv contrast and its need for dx'ing a PE.   Per Appelbaum/Yulio, the classic four key components to address in a capacity evaluation include:   1) Can pt communicate a choice?  2) Can pt understand & retain details about choice?  3) Can pt appreciate situation, tx offered, and possible outcomes (RBAs)?    4) Can pt exhibit sound rationalization/reasoning for their ultimate decision?    The pt satisfied the above criteria and understood our discussion. Will get non-con scans. ZHANG OLIVERA - VSS, HDS in room. Pt is still in pain after tylenol given. Will medicate for pain again. Contacting hospitalist for admission to control pain. Pt feels unsafe going home w/ percocet stating it is too strong for her and cannot take. Mili OLIVERA PGY-3: paged dr cannon

## 2022-10-17 NOTE — H&P ADULT - PROBLEM SELECTOR PLAN 4
- glucose to 59 via FS; non-diabetic  - given D-50 in the ED  - diet prescribed, along with supplements  - f/u for any repeat episode of hypoglycemia

## 2022-10-17 NOTE — ED ADULT TRIAGE NOTE - CHIEF COMPLAINT QUOTE
Patient has bladder cancer with mets to the lung pt c/o of hemoptysis cough and having sever bone pain in the left side. Pt on Chemotherapy Was covid positive on 9/26/2022 Patient has bladder cancer with mets to the lung pt c/o of hemoptysis cough and having severe bone pain in the left side. Pt on Chemotherapy Was covid positive on 9/26/2022

## 2022-10-17 NOTE — ED PROVIDER NOTE - PHYSICAL EXAMINATION
CONSTITUTIONAL: frail appearing, in NAD  SKIN: cool dry  HEAD: NCAT  EYES: no scleral icterus, conjunctiva pink  ENT: normal pharynx with no erythema  NECK: Supple; non tender  CARD: RRR, no murmurs.  RESP: clear to ausculation b/l. No crackles or wheezing.  ABD: soft, non-tender, non-distended, no rebound or guarding. (+) CVAT b/l. Nephrostomy tube showing dark urine.     MSK: no pedal edema, no calf tenderness  NEURO: sensation grossly intact b/l in extremities   PSYCH: Cooperative, appropriate.

## 2022-10-17 NOTE — H&P ADULT - PROBLEM SELECTOR PLAN 1
- acute on chronic, involving both hips, with left worse than right - 10/10 maximum intensity  - difficulty ambulating and sleeping due to pain  - no longer palliated with Tramadol 25 mg and Tylenol  - CT scan of abdomen and pelvis = "no evidence of osseous metastatic disease."  - currently pain relieved with morphine 2 mg IV Q4H  - caution with NSAIDs, Tramadol due to report of hemoptysis  - may need to start low dose oral opiate  - Physical therapy eval ordered (please f/u)  - ensure optimal hydration  - f/u Vitamin D level in the AM  - may need Pain Management consult, in the setting of malignancy

## 2022-10-17 NOTE — H&P ADULT - NSHPSOCIALHISTORY_GEN_ALL_CORE
SOCIAL HISTORY:    Marital Status:  (  )    (  ) Single        (  )        (  )        (  )   Lives with:        (  ) Alone       (  ) Spouse      (  ) Children        (  ) Parents           (  ) Other  Occupation:     No history of smoking  No history of alcohol abuse  No history of illegal drug use

## 2022-10-17 NOTE — H&P ADULT - PROBLEM SELECTOR PLAN 8
- with metastases  - has left hydronephrosis  - has right nephrostomy tube in place draining w/o difficulty  - Oncology consult in the AM (if PMD agrees, and per PMD's designation)

## 2022-10-17 NOTE — H&P ADULT - NSHPREVIEWOFSYSTEMS_GEN_ALL_CORE
REVIEW OF SYSTEMS:    CONSTITUTIONAL: Some generalized weakness due to inadequate nutrition.  No fever, chills or sweating  EYES/ENT: No visual changes.  No dysphagia  NECK: No pain or stiffness  RESPIRATORY: No cough or hemoptysis.  No shortness of breath  CARDIOVASCULAR: No chest pain or palpitations.  No lower extremity edema  GASTROINTESTINAL: No epigastric or abdominal pain. No nausea, vomiting or hematemesis.  Infrequent bowel movements; no BM x 3 days (normal, per patient).  No melena or hematochezia.  GENITOURINARY: No dysuria, frequency or hematuria  MUSCULOSKELETAL: No joint pain, swelling, decreased ROM, erythema, warmth  NEUROLOGICAL: Frequent cramping of the legs recently.  No numbness or weakness  PSYCHIATRY: No anxiety, or depression.  SKIN: No itching, burning, rashes, or lesions   All other review of systems is negative unless indicated above.

## 2022-10-17 NOTE — ED ADULT NURSE NOTE - OBJECTIVE STATEMENT
pt received rm 23 a&ox4, ambulatory. c/o blood tinged mucus, vaginal bleeding, and severe left hip pain for past few days. 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 Plans to start immunotherapy. Denies abdominal pain, N/V, fevers, SOB, dizziness. Lungs are clear bilaterally on auscultation.  22g placed to left hand. Labs collected and sent. pt received rm 23 a&ox4, ambulatory. c/o blood tinged mucus, vaginal bleeding, and severe left hip pain for past few days. 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 Plans to start immunotherapy. Denies abdominal pain, N/V, fevers, SOB, dizziness. Lungs are clear bilaterally on auscultation. Breathing is even and unlabored. NSR on monitor. 22g placed to left hand. Labs collected and sent.

## 2022-10-17 NOTE — ED PROVIDER NOTE - NS ED ROS FT
Constitutional: (-) chills, (-) lethargy  ENMT: (-) nasal discharge, (-) sore throat.  Cardiac: (-) chest pain (-) palpitations  Respiratory:  (-) cough (-) WOB increase   GI:  (-) nausea (-) vomiting (-) diarrhea   :  (-) dysuria (-) frequency (-) burning.  MS:  (+) back pain  Neuro:  (-) headache (-) numbness (-) tingling   Skin:  (-) rash  Except as documented in the HPI,  all other systems are negative

## 2022-10-17 NOTE — H&P ADULT - PROBLEM SELECTOR PLAN 9
- in the setting of report of hemoptysis, and anemia, holding off on pharmacological DVT prophylaxis  - SCDs for now  - PPI prescribed; short course

## 2022-10-17 NOTE — H&P ADULT - NSHPPHYSICALEXAM_GEN_ALL_CORE
Vital Signs Last 24 Hrs  T(C): 36.6 (17 Oct 2022 19:41), Max: 36.8 (17 Oct 2022 13:09)  T(F): 97.9 (17 Oct 2022 19:41), Max: 98.2 (17 Oct 2022 13:09)  HR: 68 (17 Oct 2022 19:41) (68 - 85)  BP: 92/64 (17 Oct 2022 19:41) (92/64 - 133/83)  BP(mean): --  RR: 18 (17 Oct 2022 19:41) (16 - 18)  SpO2: 100% (17 Oct 2022 19:41) (99% - 100%)    Parameters below as of 17 Oct 2022 19:41  Patient On (Oxygen Delivery Method): room air    ==============================================================  . Vital Signs Last 24 Hrs  T(C): 36.6 (17 Oct 2022 19:41), Max: 36.8 (17 Oct 2022 13:09)  T(F): 97.9 (17 Oct 2022 19:41), Max: 98.2 (17 Oct 2022 13:09)  HR: 68 (17 Oct 2022 19:41) (68 - 85)  BP: 92/64 (17 Oct 2022 19:41) (92/64 - 133/83)  BP(mean): --  RR: 18 (17 Oct 2022 19:41) (16 - 18)  SpO2: 100% (17 Oct 2022 19:41) (99% - 100%)    Parameters below as of 17 Oct 2022 19:41  Patient On (Oxygen Delivery Method): room air    ==============================================================  .  PHYSICAL EXAMINATION:    APPEARANCE: Adequately groomed female, generalized muscle wasting, malnourished appearing, lying propped up in stretcher.  Appears to be experiencing some painful discomfort	  HEENT: Mildy dry oral mucosa, PERRL, EOMI	  LYMPHATIC: No lymphadenopathy appreciated  CARDIOVASCULAR: (+) S1 S2.  No JVD.  No murmurs.  No edema  RESPIRATORY: No wheezing, rhonchi, crackles appreciated  GASTROINTESTINAL:  Soft, Non-tender, (+) BS  GENITOURINARY: No suprapubic tenderness.  No CVA tenderness B/L  EXTREMITIES: Normal range of motion.  No clubbing, cyanosis or edema  MUSCULOSKELETAL: Muscle atrophy.  No asymmetry.  Good ROM  SKIN: No rashes. No ecchymoses.  No cyanosis  PSYCHIATRIC: A&O x 3.  Mood & affect appropriate to situation  NEUROLOGICAL: Non-focal, JEONG x 4 against gravity  VASCULAR: Peripheral pulses palpable

## 2022-10-17 NOTE — H&P ADULT - PROBLEM SELECTOR PLAN 7
- of the lower extremities  - frequent occurrences lately  - vitamin B-12 level appears elevated on recent lab-work  - f/u Vitamin B1, TSH levels

## 2022-10-17 NOTE — H&P ADULT - PROBLEM SELECTOR PLAN 6
- no bowel movement for the past ~ 3 days  - f/u TSH level  - ensure optimal hydration  - f/u electrolytes - no bowel movement for the past ~ 3 days  - currently prescribed opiate  - f/u TSH level  - ensure optimal hydration  - f/u electrolytes  - dulcolax daily PRN  - senna 2 tab HS

## 2022-10-17 NOTE — H&P ADULT - NSHPLABSRESULTS_GEN_ALL_CORE
LAB-WORK/STUDIES:                          8.9    9.75  )-----------( 357      ( 17 Oct 2022 14:52 )             30.0     17 Oct 2022 14:52    133    |  99     |  20     ----------------------------<  107    4.4     |  21     |  0.83     Ca    10.3       17 Oct 2022 14:52  Phos  3.3       17 Oct 2022 14:52  Mg     2.10      17 Oct 2022 14:52    TPro  7.9    /  Alb  3.6    /  TBili  <0.2   /  DBili  x      /  AST  18     /  ALT  10     /  AlkPhos  105    17 Oct 2022 14:52    LIVER FUNCTIONS - ( 17 Oct 2022 14:52 )  Alb: 3.6 g/dL / Pro: 7.9 g/dL / ALK PHOS: 105 U/L / ALT: 10 U/L / AST: 18 U/L / GGT: x           PT/INR - ( 17 Oct 2022 14:52 )   PT: 14.0 sec;   INR: 1.20 ratio    PTT - ( 17 Oct 2022 14:52 )  PTT:23.6 sec    CAPILLARY BLOOD GLUCOSE    POCT Blood Glucose.: 90 mg/dL (17 Oct 2022 19:46)  POCT Blood Glucose.: 93 mg/dL (17 Oct 2022 15:32)  POCT Blood Glucose.: 59 mg/dL (17 Oct 2022 14:22)    =======================================================        ======================================================= LAB-WORK/STUDIES:                          8.9    9.75  )-----------( 357      ( 17 Oct 2022 14:52 )             30.0     17 Oct 2022 14:52    133    |  99     |  20     ----------------------------<  107    4.4     |  21     |  0.83     Ca    10.3       17 Oct 2022 14:52  Phos  3.3       17 Oct 2022 14:52  Mg     2.10      17 Oct 2022 14:52    TPro  7.9    /  Alb  3.6    /  TBili  <0.2   /  DBili  x      /  AST  18     /  ALT  10     /  AlkPhos  105    17 Oct 2022 14:52    LIVER FUNCTIONS - ( 17 Oct 2022 14:52 )  Alb: 3.6 g/dL / Pro: 7.9 g/dL / ALK PHOS: 105 U/L / ALT: 10 U/L / AST: 18 U/L / GGT: x           PT/INR - ( 17 Oct 2022 14:52 )   PT: 14.0 sec;   INR: 1.20 ratio    PTT - ( 17 Oct 2022 14:52 )  PTT:23.6 sec    CAPILLARY BLOOD GLUCOSE    POCT Blood Glucose.: 90 mg/dL (17 Oct 2022 19:46)  POCT Blood Glucose.: 93 mg/dL (17 Oct 2022 15:32)  POCT Blood Glucose.: 59 mg/dL (17 Oct 2022 14:22)    =======================================================    ECG = sinus rhythm with occasional PVCs at 74 bpm, QTc = 432    =======================================================

## 2022-10-17 NOTE — H&P ADULT - PROBLEM SELECTOR PLAN 2
- patient endorsed same earlier, in the setting of cancer metastatic to the lungs  - no episodes presently  - CT demonstrates "Numerous bilateral pulmonary metastases; mild interval increase in size of a few of the largest pulmonary metastases compared to 9/20/2022."  - f/u hemoglobin trend  - could get Pulmonology consult

## 2022-10-17 NOTE — ED ADULT NURSE NOTE - CHIEF COMPLAINT QUOTE
Patient has bladder cancer with mets to the lung pt c/o of hemoptysis cough and having sever bone pain in the left side. Pt on Chemotherapy Was covid positive on 9/26/2022

## 2022-10-17 NOTE — ED PROVIDER NOTE - CLINICAL SUMMARY MEDICAL DECISION MAKING FREE TEXT BOX
Anthony: 55yo with known bladder and lung mets. with increasing hip and side pain. Increasing sob and hemoptosis. will CT chest, check labs and treat pain. will need admission for pain control at the very least. 56 yof hx of cx w/ hemoptysis and increasing LBP and L side pain w/ previous imaging showing retroabd LAD w/o overt L hip/back bone infiltration    c/f PE given HPI. Pt doesn't want CT w/ contrast given prior discussions w/ her doctors in past to "protect her kidney". See progress note. Will scan her chest and A/P to better stage her pain and any interventions that can be done. Likely admit for pain control.      Anthony: 57yo with known bladder and lung mets. with increasing hip and side pain. Increasing sob and hemoptosis. will CT chest, check labs and treat pain. will need admission for pain control at the very least.

## 2022-10-17 NOTE — H&P ADULT - HISTORY OF PRESENT ILLNESS
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  Seen and evaluated at bedside;    Vital signs upon ED presentation as follows: BP = 133/83, HR = 85, RR = 16, T = 36.8 C (98.2 F), O2 Sat = 100% on RA.  Diagnosed with Chronic Back Pain and prescribed Tylenol 1 gram, Ketorolac 15 mg, dextrose 50 mL (FS of 59) and Zofran 4 mg IV x one in the ED. 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  Seen and evaluated at bedside; appears to be experiencing painful discomfort of the back, hips and nephrostomy tube site.  Patient reports worsening pain (10/10) left lower back/hip pain, which spread to involve the right side, approximately one week ago.  Also suffers with pain of the nephrostomy tube site.  Initially palliated with Tramadol 25 mg, but no longer effective, even when taken with Tylenol ~ 2 hours apart.  Reports sleep difficulty due to pain, as well as problems ambulating; has to use a cane at times.  Experiences occasional cramping of the legs - more frequent occurrences lately.  A bit frustrated because of side effects of dietary intake and medications.  Comments on significant weight loss, in the setting of malignancy.  Reports of some hemoptysis and mild shortness of breath with ambulation.    Vital signs upon ED presentation as follows: BP = 133/83, HR = 85, RR = 16, T = 36.8 C (98.2 F), O2 Sat = 100% on RA.  Diagnosed with Chronic Back Pain and prescribed Tylenol 1 gram, Ketorolac 15 mg, dextrose 50 mL (FS of 59) and Zofran 4 mg IV x one in the ED.

## 2022-10-17 NOTE — H&P ADULT - PROBLEM SELECTOR PLAN 5
- albumin = 3.6; hemoconcentrated  - patient w/ poor intake, chiefly due to side effects of foods (e.g. gastritis, diarrhea)  - confused/frustrated as to what to eat at present  - no longer taking MVI; will restart  - ensure optimal hydration  - f/u AM lab-work, including prealbumin level  - Nutrition consult ordered (please f/u)

## 2022-10-17 NOTE — ED PROVIDER NOTE - OBJECTIVE STATEMENT
56 yof w/ bladder cx w/ lung mets, chronic pain, nephrostomy tube in place p/w hemoptysis and SOB and increasing L hip and back pain. 12/10 L hip pain radiating to her back, this is not new to her. Medicated w/ tylenol and tramadol w/ 8/10 relief. Recently having hemoptysis at home as well and subjective SOB since d/c from hospital ~ 4 days prior for covid and UTI tx inpt. Has L sided nephrostomy tube last changed 8/22, d/t hydroneph w/ hematuria. tx w/ cefepime in-pt.          No fevers, no chills, flank pain b/l is present.

## 2022-10-17 NOTE — H&P ADULT - PROBLEM SELECTOR PLAN 3
- in the setting of cancer metastatic to the lungs  - patient chiefly sedentary  - CT demonstrates "Numerous bilateral pulmonary metastases; mild interval increase in size of a few of the largest pulmonary metastases compared to 9/20/2022."  - maintaining good pulse oximetry

## 2022-10-18 NOTE — PROVIDER CONTACT NOTE (OTHER) - SITUATION
Pt c/o R back pain. Pt states Tylenol 650mG PO does not provide relief and does not want Tylenol IVPB. Pt does not want to take Morphine IVP d/t chest discomfort felt with last dose in ED,
Pt c/o 8/10 " heavy" chest pain.

## 2022-10-18 NOTE — PROGRESS NOTE ADULT - PROBLEM SELECTOR PLAN 7
Poor PO intake, confused/frustrated as to what to eat at present. no longer taking MVI; will restart    - Nutrition consult placed

## 2022-10-18 NOTE — PROVIDER CONTACT NOTE (OTHER) - ASSESSMENT
Pt tearful, complaining of 8/10 chest pain with numbness/tingling in her back.
A&Ox4. Visibly uncomfortable, education provided regarding medications with tylenol and morphine. CR and BUN within range. ACP ordered toradol 15mG IVP x1.

## 2022-10-18 NOTE — PATIENT PROFILE ADULT - FALL HARM RISK - RISK INTERVENTIONS

## 2022-10-18 NOTE — ED ADULT NURSE REASSESSMENT NOTE - NS ED NURSE REASSESS COMMENT FT1
Break RN note- Patient resting quietly in bed, breathing even and nonlabored. No acute distress. FS as documented. Dr. Simon made aware. Patient to be medicated as ordered. Safety maintained. Patient stable upon exiting the room.
Patient complaining of left sided chest pain starting after receiving ofirmev. Dr. Simon made aware. EKG to be obtained. Cardiac monitor to be put in place. Trop added on per MD. Safety maintained. Patient stable upon exiting the room.
Pt. refused to go to xray. MD made aware.
pt requested her fs be rechecked. fs 90. pt refusing dextrose treatment. apple juice and crackers given. MD made aware. Will continue to monitor.
report given to BRENDEN Pierre. pt to be brought to [facility] to await bed assignment

## 2022-10-18 NOTE — PROVIDER CONTACT NOTE (OTHER) - ACTION/TREATMENT ORDERED:
EKG performed and troponin pending.
ACP notified regarding patient wanting to talk to provider. Provider will come to bedside.

## 2022-10-18 NOTE — PROVIDER CONTACT NOTE (OTHER) - BACKGROUND
PMH bladder ca with mets to lungs, Hydronephrosis and R nephrostomy tube. Admitted to ED with SOB and worsening pain.  Pt has chronic back pain .
Admitted for dorsalgia

## 2022-10-18 NOTE — PROGRESS NOTE ADULT - ASSESSMENT
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     Admitted for further evaluation.

## 2022-10-18 NOTE — PROGRESS NOTE ADULT - SUBJECTIVE AND OBJECTIVE BOX
Patient is a 56y old  Female who presents with a chief complaint of Chronic back pain (17 Oct 2022 21:38)      SUBJECTIVE / OVERNIGHT EVENTS:    No events overnight. This AM, patient without n/v/d/cp/sob. Patient seen and examined. Tearful during interview and reports feeling overwhelmed due to the pain.     MEDICATIONS  (STANDING):  ascorbic acid 500 milliGRAM(s) Oral daily  ferrous    sulfate 325 milliGRAM(s) Oral daily  folic acid 1 milliGRAM(s) Oral daily  influenza   Vaccine 0.5 milliLiter(s) IntraMuscular once  senna 2 Tablet(s) Oral at bedtime  sodium chloride 0.9%. 1000 milliLiter(s) (75 mL/Hr) IV Continuous <Continuous>    MEDICATIONS  (PRN):  acetaminophen     Tablet .. 650 milliGRAM(s) Oral every 6 hours PRN Mild Pain (1 - 3)  aluminum hydroxide/magnesium hydroxide/simethicone Suspension 30 milliLiter(s) Oral every 4 hours PRN Dyspepsia  bisacodyl 5 milliGRAM(s) Oral daily PRN Constipation  melatonin 3 milliGRAM(s) Oral at bedtime PRN Insomnia  morphine  - Injectable 2 milliGRAM(s) IV Push every 4 hours PRN Moderate Pain (4 - 6)  morphine  - Injectable 4 milliGRAM(s) IV Push every 4 hours PRN Severe Pain (7 - 10)  ondansetron Injectable 4 milliGRAM(s) IV Push every 8 hours PRN Nausea and/or Vomiting      PHYSICAL EXAM:  T(C): 36.8 (10-18-22 @ 17:01), Max: 36.8 (10-18-22 @ 00:55)  HR: 71 (10-18-22 @ 17:01) (68 - 75)  BP: 116/71 (10-18-22 @ 17:01) (92/64 - 126/66)  RR: 17 (10-18-22 @ 17:01) (16 - 18)  SpO2: 100% (10-18-22 @ 17:01) (100% - 100%)  I&O's Summary    GENERAL: NAD, frail, temporal wasting  HEAD:  Atraumatic, Normocephalic, MMM  CHEST/LUNG: No use of accessory muscles, CTAB, breathing non-labored  COR: RR, no mrcg  ABD: Soft, ND/NT, +BS, Nephrostomy tube in place draining yellow urine, dressing in place and dry.   PSYCH: AAOx3  NEUROLOGY: CN II-XII grossly intact, moving all extremities  SKIN: No rashes or lesions  EXT: no LE edema noted     LABS:  CAPILLARY BLOOD GLUCOSE      POCT Blood Glucose.: 136 mg/dL (18 Oct 2022 14:06)  POCT Blood Glucose.: 64 mg/dL (18 Oct 2022 09:56)  POCT Blood Glucose.: 85 mg/dL (18 Oct 2022 08:37)  POCT Blood Glucose.: 90 mg/dL (17 Oct 2022 19:46)                          8.9    9.75  )-----------( 357      ( 17 Oct 2022 14:52 )             30.0     10-18    137  |  102  |  17  ----------------------------<  83  4.5   |  23  |  0.84    Ca    9.8      18 Oct 2022 06:45  Phos  3.4     10-18  Mg     2.10     10-18    TPro  7.9  /  Alb  3.6  /  TBili  <0.2  /  DBili  x   /  AST  18  /  ALT  10  /  AlkPhos  105  10-17    PT/INR - ( 17 Oct 2022 14:52 )   PT: 14.0 sec;   INR: 1.20 ratio         PTT - ( 17 Oct 2022 14:52 )  PTT:23.6 sec            RADIOLOGY & ADDITIONAL TESTS:    Telemetry Personally Reviewed -     Imaging Reviewed -     Consultant(s) Notes Reviewed -       Care Discussed with Consultants/Other Providers -

## 2022-10-18 NOTE — PROGRESS NOTE ADULT - PROBLEM SELECTOR PLAN 5
with metastases and has left hydronephrosis  s/p right nephrostomy tube in place draining w/o difficulty    - Oncology consult in the AM

## 2022-10-19 NOTE — DIETITIAN INITIAL EVALUATION ADULT - PERTINENT LABORATORY DATA
10-19    137  |  102  |  20  ----------------------------<  86  4.6   |  24  |  0.88    Ca    10.3      19 Oct 2022 05:57  Phos  3.4     10-18  Mg     2.10     10-18    TPro  7.0  /  Alb  3.5  /  TBili  <0.2  /  DBili  x   /  AST  13  /  ALT  8   /  AlkPhos  96  10-19  POCT Blood Glucose.: 74 mg/dL (10-19-22 @ 07:50)  A1C with Estimated Average Glucose Result: 5.6 % (06-29-22 @ 13:49)  A1C with Estimated Average Glucose Result: 5.7 % (06-28-22 @ 12:08)  A1C with Estimated Average Glucose Result: 5.5 % (01-16-22 @ 18:39)

## 2022-10-19 NOTE — CONSULT NOTE ADULT - PROBLEM SELECTOR RECOMMENDATION 3
- Patient with metastatic bladder cancer with metastases to the lungs s/p nephrostomy tube   s/p right nephrostomy tube  - CT-  Large bladder mass is without significant change. Severe chronic hydronephrosis of the left kidney is unchanged. Right nephrostomy tube in place; no right hydronephrosis. 2.  Numerous bilateral pulmonary metastases; mild interval increase in size of a few of the largest pulmonary metastases compared to 9/20/2022.  3.  Progression of liver metastases. 4.  No evidence of osseous metastatic disease.  - Patient interested in further workup and treatments for her malignancy at this time . She has been in process of switching oncologic care with Dr. Alcocer/Dr. Kan  - Recommend Oncology consult

## 2022-10-19 NOTE — DIETITIAN INITIAL EVALUATION ADULT - NS FNS DIET ORDER
Diet, Regular:   High Fiber (HIFIBER)  Supplement Feeding Modality:  Oral  Ensure Clear Cans or Servings Per Day:  1       Frequency:  Three Times a day (10-18-22 @ 05:30)

## 2022-10-19 NOTE — DIETITIAN INITIAL EVALUATION ADULT - SIGNS/SYMPTOMS
<75% nutritional needs >1month, severe muscle wasting and fat loss metastatic cancer, s/p chemotherapy

## 2022-10-19 NOTE — DIETITIAN INITIAL EVALUATION ADULT - ORAL NUTRITION SUPPLEMENTS
Recommend change PO supplement to Ensure Plus (350 kcal, 20 gm protein per 8 oz serving) x 2 per day for added calories and protein.

## 2022-10-19 NOTE — DIETITIAN INITIAL EVALUATION ADULT - PERTINENT MEDS FT
MEDICATIONS  (STANDING):  ascorbic acid 500 milliGRAM(s) Oral daily  chlorhexidine 2% Cloths 1 Application(s) Topical <User Schedule>  cholecalciferol 2000 Unit(s) Oral daily  enoxaparin Injectable 40 milliGRAM(s) SubCutaneous every 24 hours  ferrous    sulfate 325 milliGRAM(s) Oral daily  folic acid 1 milliGRAM(s) Oral daily  influenza   Vaccine 0.5 milliLiter(s) IntraMuscular once  ketorolac   Injectable 15 milliGRAM(s) IV Push once  lidocaine   4% Patch 1 Patch Transdermal every 24 hours  pantoprazole    Tablet 40 milliGRAM(s) Oral before breakfast  senna 2 Tablet(s) Oral at bedtime    MEDICATIONS  (PRN):  acetaminophen     Tablet .. 650 milliGRAM(s) Oral every 6 hours PRN Mild Pain (1 - 3)  aluminum hydroxide/magnesium hydroxide/simethicone Suspension 30 milliLiter(s) Oral every 4 hours PRN Dyspepsia  bisacodyl 5 milliGRAM(s) Oral daily PRN Constipation  melatonin 3 milliGRAM(s) Oral at bedtime PRN Insomnia  morphine  - Injectable 2 milliGRAM(s) IV Push every 4 hours PRN Moderate Pain (4 - 6)  morphine  - Injectable 4 milliGRAM(s) IV Push every 4 hours PRN Severe Pain (7 - 10)  ondansetron Injectable 4 milliGRAM(s) IV Push every 8 hours PRN Nausea and/or Vomiting

## 2022-10-19 NOTE — CONSULT NOTE ADULT - SUBJECTIVE AND OBJECTIVE BOX
HPI:  56 year old female, with past history significant for Asthma, Anemia, Bladder cancer with 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  Seen and evaluated at bedside; appears to be experiencing painful discomfort of the back, hips and nephrostomy tube site.  Patient reports worsening pain (10/10) left lower back/hip pain, which spread to involve the right side, approximately one week ago.  Also suffers with pain of the nephrostomy tube site.  Initially palliated with Tramadol 25 mg, but no longer effective, even when taken with Tylenol ~ 2 hours apart.  Reports sleep difficulty due to pain, as well as problems ambulating; has to use a cane at times.  Experiences occasional cramping of the legs - more frequent occurrences lately.  A bit frustrated because of side effects of dietary intake and medications.  Comments on significant weight loss, in the setting of malignancy.  Reports of some hemoptysis and mild shortness of breath with ambulation.    Vital signs upon ED presentation as follows: BP = 133/83, HR = 85, RR = 16, T = 36.8 C (98.2 F), O2 Sat = 100% on RA.  Diagnosed with Chronic Back Pain and prescribed Tylenol 1 gram, Ketorolac 15 mg, dextrose 50 mL (FS of 59) and Zofran 4 mg IV x one in the ED. (17 Oct 2022 21:38)      Interval History:   Patient reports having chronic back pain for a few months now. Has pain located in left back with radiation to left lateral side and at times to right back side. Pain is intermittent in nature; pain is sharp: 8/10 at its worst.   Patient appeared uncomfortable during exam, s/p tylenol about 2 hours ago.     PERTINENT PM/SXH:   Anemia  Asthma  HLD (hyperlipidemia)  Pacemaker  Bladder cancer  HTN (hypertension)  Parathyroid tumor  Liver cyst  Hydronephrosis  Bradycardia  History of renal calculi  Birthmark  H/O myomectomy  Presence of cardiac pacemaker  H/O hydronephrosis    FAMILY HISTORY:  Family history of prostate cancer (Father)  Family history of CHF (congestive heart failure) (Father)  Family history of atrial fibrillation (Father)    ITEMS NOT CHECKED ARE NOT PRESENT    SOCIAL HISTORY:   Significant other/partner[ ]  Children[ ]  Mandaen/Spirituality: Uatsdin   Substance hx:  [ ]   Tobacco hx:  [ ]   Alcohol hx: [ ]   Home Opioid hx:  [ x] I-Stop Reference No:  Living Situation: [x ]Home  [ ]Long term care  [ ]Rehab [ ]Other      ADVANCE DIRECTIVES:    MOLST  [ ]  Living Will  [ ]   DECISION MAKER(s):  [ ] Health Care Proxy(s)  [ x] Surrogate(s)  [ ] Guardian           Name(s): Phone Number(s):  Sister Giovanna Bell: [phone]  Son : Sonny Bell: [phone]     BASELINE (I)ADL(s) (prior to admission):  [place]: [x ]Total  [ ] Moderate [ ]Dependent    Allergies    No Known Allergies    Intolerances    MEDICATIONS  (STANDING):  ascorbic acid 500 milliGRAM(s) Oral daily  chlorhexidine 2% Cloths 1 Application(s) Topical <User Schedule>  cholecalciferol 2000 Unit(s) Oral daily  enoxaparin Injectable 40 milliGRAM(s) SubCutaneous every 24 hours  ferrous    sulfate 325 milliGRAM(s) Oral daily  folic acid 1 milliGRAM(s) Oral daily  influenza   Vaccine 0.5 milliLiter(s) IntraMuscular once  ketorolac   Injectable 15 milliGRAM(s) IV Push once  lidocaine   4% Patch 1 Patch Transdermal every 24 hours  pantoprazole    Tablet 40 milliGRAM(s) Oral before breakfast  senna 2 Tablet(s) Oral at bedtime    MEDICATIONS  (PRN):  acetaminophen     Tablet .. 650 milliGRAM(s) Oral every 6 hours PRN Mild Pain (1 - 3)  aluminum hydroxide/magnesium hydroxide/simethicone Suspension 30 milliLiter(s) Oral every 4 hours PRN Dyspepsia  bisacodyl 5 milliGRAM(s) Oral daily PRN Constipation  melatonin 3 milliGRAM(s) Oral at bedtime PRN Insomnia  morphine  - Injectable 2 milliGRAM(s) IV Push every 4 hours PRN Moderate Pain (4 - 6)  morphine  - Injectable 4 milliGRAM(s) IV Push every 4 hours PRN Severe Pain (7 - 10)  ondansetron Injectable 4 milliGRAM(s) IV Push every 8 hours PRN Nausea and/or Vomiting      PRESENT SYMPTOMS: [ ]Unable to self-report due to altered mental status  [ ] CPOT [ ] PAINADs [ ] RDOS  Source if other than patient:  [ ]Family   [ ]Team     Pain: [x ]yes [ ]no  QOL impact - moderate  Location -    left back                Aggravating factors  - movement   Quality - sharp  Radiation - right back and left later side   Timing- intermittent   Severity (0-10 scale): 8  Minimal acceptable level (0-10 scale): 2    CPOT:    https://www.[facility].org/getattachment/qpp32f65-8m6m-9t5n-2v9d-6558e9890j9c/Critical-Care-Pain-Observation-Tool-(CPOT)    Dyspnea:                           [ ]Mild [ ]Moderate [ ]Severe  Anxiety:                             [x ]Mild [ ]Moderate [ ]Severe  Agitation:                          [ ]Mild [ ]Moderate [ ]Severe  Fatigue:                             [ ]Mild [ ]Moderate [ ]Severe  Nausea:                             [ ]Mild [ ]Moderate [ ]Severe  Loss of appetite:              [ ]Mild [ ]Moderate [ ]Severe  Constipation:                   [ ]Mild [ ]Moderate [ ]Severe  Diarrhea:                          [ ]Mild [ ]Moderate [ ]Severe      PCSSQ[Palliative Care Spiritual Screening Question]   Severity (0-10):  Score of 4 or > indicate consideration of Chaplaincy referral.  Chaplaincy Referral: [ ] yes [ ] refused [ ] following [ x] deferred    Caregiver [place]? : [ ] yes [ ] no [ ] Declined   [x ] Deferred            Social work referral [ ] Patient & Family Centered Care Referral [ ]     Anticipatory Grief present?:  [ ] yes [ ] no  [x ] Deferred                  Social work referral [ ] Chaplaincy Referral[ ]    Other Symptoms:  [x ]All other review of systems negative     PHYSICAL EXAM:  Vital Signs Last 24 Hrs  T(C): 36.9 (19 Oct 2022 13:03), Max: 36.9 (19 Oct 2022 13:03)  T(F): 98.4 (19 Oct 2022 13:03), Max: 98.4 (19 Oct 2022 13:03)  HR: 73 (19 Oct 2022 13:03) (71 - 89)  BP: 110/63 (19 Oct 2022 13:03) (110/63 - 136/76)  BP(mean): --  RR: 16 (19 Oct 2022 13:03) (16 - 17)  SpO2: 100% (19 Oct 2022 13:03) (100% - 100%)    Parameters below as of 19 Oct 2022 13:03  Patient On (Oxygen Delivery Method): room air         I&O's Summary      GENERAL:  [ x]Alert  [x ]Oriented x   [ ]Lethargic  [ ]Cachexia  [ ]Unarousable  [ x]Verbal  [ ]Non-Verbal    Behavioral:   [x ] Anxiety  [ ] Delirium [ ] Agitation [ ] Calm  [ x] Other- appears uncomfortable   HEENT:  [ x]Normal  [ ] Temporal Wasting  [ ]Dry mouth   [ ]ET Tube/Trach  [ ]Oral lesions  [ ] Mucositis  PULMONARY:   [x ]Clear [ ]Tachypnea  [ ]Audible excessive secretions   [ ]Rhonchi        [ ]Right [ ]Left [ ]Bilateral  [ ]Crackles        [ ]Right [ ]Left [ ]Bilateral  [ ]Wheezing     [ ]Right [ ]Left [ ]Bilateral  [ ]Diminished breath sounds [ ]right [ ]left [ ]bilateral  CARDIOVASCULAR:    [x ]Regular [ ]Irregular [ ]Tachy  [ ]Bradley [ ]Murmur [ ]Other  GASTROINTESTINAL:  [x ]Soft  [ ]Distended   [ ]+BS  [ x]Non tender [ ]Tender  [ ]PEG [ ]OGT/ NGT  Last BM:   GENITOURINARY: Nephrostomy tube   [ ]Normal [ ] Incontinent   [ ]Oliguria/Anuria   [ ]Lloyd  MUSCULOSKELETAL:   [ x]Normal   [ ]Weakness  [ ]Bed/Wheelchair bound [ ]Edema  [  ] amputation  [  ] contraction  NEUROLOGIC:   [ x]No focal deficits  [ ]Cognitive impairment  [ ]Dysphagia [ ]Dysarthria [ ]Paresis [ ]Other   SKIN: See Nursing Skin Assessment for further details  [x ]Normal    [ ]Rash  [ ]Pressure ulcer(s)       Present on admission [ ]y [ ]n   [  ]  Wound    [  ] hyperpigmentation    CRITICAL CARE:  [ ] Shock Present  [ ]Septic [ ]Cardiogenic [ ]Neurologic [ ]Hypovolemic  [ ]  Vasopressors [ ]  Inotropes   [ ]Respiratory failure present [ ]Mechanical ventilation [ ]Non-invasive ventilatory support [ ]High flow    [ ]Acute  [ ]Chronic [ ]Hypoxic  [ ]Hypercarbic [ ]Other  [ ]Other organ failure     LABS:                        8.0    7.94  )-----------( 345      ( 19 Oct 2022 05:57 )             26.9   10-19    137  |  102  |  20  ----------------------------<  86  4.6   |  24  |  0.88    Ca    10.3      19 Oct 2022 05:57  Phos  3.4     10-18  Mg     2.10     10-18    TPro  7.0  /  Alb  3.5  /  TBili  <0.2  /  DBili  x   /  AST  13  /  ALT  8   /  AlkPhos  96  10-19      CAPILLARY BLOOD GLUCOSE      POCT Blood Glucose.: 74 mg/dL (19 Oct 2022 07:50)      RADIOLOGY & ADDITIONAL STUDIES:  CT Chest/ Abd / Pelvis 10/17/2022  IMPRESSION:  1.  Large bladder mass is without significant change. Severe chronic   hydronephrosis of the left kidney is unchanged. Right nephrostomy tube in   place; no right hydronephrosis.  2.  Numerous bilateral pulmonary metastases; mild interval increase in   size of a few of the largest pulmonary metastases compared to 9/20/2022.  3.  Progression of liver metastases.  4.  No evidence of osseous metastatic disease.    XRAY Hip 10/17/2022  No hip fractures or dislocations.  Intact pelvic and obturator rings and symmetrically aligned and spaced SI   joints and pubic symphysis.  Preserved bilateral hip joint spaces. No gross radiographic evidence for   AVN.  No destructive osseous lesions or periosteal reaction.  Nephrostomy tube with interconnecting hub courses along peripheral right   flank/hip/thigh region.    PROTEIN CALORIE MALNUTRITION PRESENT: [ ]mild [ ]moderate [ ]severe [ ]underweight [ ]morbid obesity  https://www.andeal.org/vault/2440/web/files/ONC/Table_Clinical%20Characteristics%20to%20Document%20Malnutrition-White%20JV%20et%20al%202012.pdf    Height (cm): 162.6 (10-18-22 @ 18:03), 162.6 (09-19-22 @ 14:23), 162.6 (08-15-22 @ 15:45)  Weight (kg): 38 (10-18-22 @ 18:03), 38.3 (09-19-22 @ 14:23), 38.6 (08-15-22 @ 15:45)  BMI (kg/m2): 14.4 (10-18-22 @ 18:03), 14.5 (09-19-22 @ 14:23), 14.6 (08-15-22 @ 15:45)    [ ]PPSV2 < or = to 30% [ ]significant weight loss  [ ]poor nutritional intake  [ ]anasarca [ ]Artificial Nutrition      REFERRALS:   [ ]Chaplaincy  [ ]Hospice  [ ]Child Life  [ ]Social Work  [ x]Case management [ ]Holistic Therapy     ************************************************************************  PALLIATIVE MEDICINE COORDINATION OF CARE DOCUMENTATION  [x] Inpatient Consult  [ ] Other:  ************************************************************************    HPI reviewed     ------------------------------------------------------------------------    MEDICATION REVIEW:  --- Pls refer to current medicatons in the body of this note  ISTOP REFERENCE: [id number]  Others' Prescriptions  Patient Name: Zoë Harris Date: 1965  Address: [facility] [address] [place], [place] [address]Tyg: Female  Rx Written	Rx Dispensed	Drug	Quantity	Days Supply	Prescriber Name	Prescriber Abi #	Payment Method	Dispenser  10/04/2022	10/15/2022	tramadol hcl 50 mg tablet	10	5	Jai Hughes [id number]	Medicare	[facility] [id number]  08/26/2022	09/01/2022	tramadol hcl 50 mg tablet	20	5	[id number]	Medicare	[facility] [id number]  08/26/2022	09/01/2022	diazepam 5 mg tablet	15	5	[id number]	Medicare	[facility] [id number]  --- PRN usage: The patient HAS used 1 prn dose of IV Morphine 2mg in the last 24h.  ------------------------------------------------------------------------  COORDINATION OF CARE:  --- Palliative Care consulted for: symptom management; goals of care  --- Patient assessed: 10/19  --- Patient previously seen by Palliative Care service: No  ADVANCE CARE PLANNING  --- HCP/ Surrogate: Son and sister   --- GOC document found in Alpha: NONE  --- HCP/ Living will/ Other advanced directives in Alpha: NONE  ------------------------------------------------------------------------  CARE PROVIDER DOCUMENTATION:  --- SW/CM notes reviewed  --- Medicine notes reviewed  PLAN OF CARE  --- Known admissions in past year: 10  --- Current admit date: 10/17  --- LOS: 1 day  --- LACE score: 14  --- Current dispo plan: TO BE DETERMINED  ------------------------------------------------------------------------  --- Chart reviewed: 30 Minutes [including time used to gather, review and transfer data to this note]    Prolonged services rendered, as part of this patient's care provided by Palliative Medicine, include: i.chart review for provider and ancillary service documentation, ii.pertinent diagnostics including laboratory and imaging studies,iii. medication review including PRN use, iv. admission history including previous palliative care encounters and GOC notes, v.advance care planning documents including HCP and MOLST forms in Alpha. Part of Palliative Medicine extended evaluation and management also involves coordination of care with our IDT, the primary and consulting neal, and unit CM/SW and Hospice if eligible. Recommendations based on the information gathered and discussed are outline in the AP of our notes.    ************************************************************************

## 2022-10-19 NOTE — DIETITIAN NUTRITION RISK NOTIFICATION - TREATMENT: THE FOLLOWING DIET HAS BEEN RECOMMENDED
Diet, Regular:   High Fiber (HIFIBER)  Supplement Feeding Modality:  Oral  Ensure Clear Cans or Servings Per Day:  1       Frequency:  Three Times a day (10-18-22 @ 05:30) [Active]

## 2022-10-19 NOTE — DIETITIAN INITIAL EVALUATION ADULT - REASON FOR ADMISSION
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

## 2022-10-19 NOTE — DIETITIAN INITIAL EVALUATION ADULT - OTHER INFO
Patient reports appetite improving in-house. Consuming >50% of most meals. Patient denies any nausea/vomiting or difficulty chewing and swallowing. C/o constipation on bowel regimen. She is ordered for Ensure Clear but does not like and prefers to take Ensure Plus HP (strawberry). General healthful nutrition education provided. Small frequent meals, nutrient and protein dense foods, and po supplement as added calories and protein suggested.

## 2022-10-19 NOTE — CHART NOTE - NSCHARTNOTEFT_GEN_A_CORE
Pt with Bladder Ca w/ mets to lungs, was managed by Oncologist at [facility] ISABEL Duran then she was seen by Dr. Alcocer at [facility] with plans to be transitioned to follow with ERICA Hewitt. Pt states she has not seen Dr. Kan due to frequent hospitalizations. Spoke with Dr. Ma, Pt will be evaluated tomorrow.

## 2022-10-19 NOTE — CONSULT NOTE ADULT - ASSESSMENT
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   Palliative Care consulted for complex decision making  related to goals of care discussions and evaluation and management of symptoms

## 2022-10-19 NOTE — PROGRESS NOTE ADULT - ASSESSMENT
_________________________________________________________________________________________  ========>>  M E D I C A L   A T T E N D I N G    F O L L O W  U P  N O T E  <<=========  -----------------------------------------------------------------------------------------------------    - Patient seen and examined by me earlier today.   - In summary,  JEF HOUSE is a 56y year old woman admitted with back / flank pain  - Patient today overall doing ok, comfortable, eating fairly      ==================>> REVIEW OF SYSTEM <<=================    GEN: no fever, no chills, on and off pain as above   RESP: no SOB, no cough, no sputum, no more hemoptysis reported   CVS: no chest pain, no palpitations, no edema  GI: no abdominal pain, no nausea, no constipation, no diarrhea  : no dysuria, no frequency, no hematuria in tube or voiding      + occasional chronic vaginal bleeding   Neuro: no headache, no dizziness  Derm : no itching, no rash    ==================>> PHYSICAL EXAM <<=================    GEN: A&O X 3 , NAD , comfortable, pleasant, calm , cachectic   HEENT: NCAT, PERRL, MMM, hearing intact  Neck: supple , no JVD appreciated  CVS: S1S2 , regular , No M/R/G appreciated  PULM: CTA B/L,  no W/R/R appreciated  ABD.: soft. non tender, non distended,  bowel sounds present  Extrem: intact pulses , no edema     nephrostomy in place and draining to bag   PSYCH : normal mood,  not anxious                            ( Note Written / Date of service :  10-19-22 )    ==================>> MEDICATIONS <<====================    MEDICATIONS  (STANDING):  ascorbic acid 500 milliGRAM(s) Oral daily  chlorhexidine 2% Cloths 1 Application(s) Topical <User Schedule>  cholecalciferol 2000 Unit(s) Oral daily  enoxaparin Injectable 40 milliGRAM(s) SubCutaneous every 24 hours  ferrous    sulfate 325 milliGRAM(s) Oral daily  folic acid 1 milliGRAM(s) Oral daily  influenza   Vaccine 0.5 milliLiter(s) IntraMuscular once  ketorolac   Injectable 15 milliGRAM(s) IV Push once  lidocaine   4% Patch 1 Patch Transdermal every 24 hours  pantoprazole    Tablet 40 milliGRAM(s) Oral before breakfast  senna 2 Tablet(s) Oral at bedtime    MEDICATIONS  (PRN):  acetaminophen     Tablet .. 650 milliGRAM(s) Oral every 6 hours PRN Mild Pain (1 - 3)  aluminum hydroxide/magnesium hydroxide/simethicone Suspension 30 milliLiter(s) Oral every 4 hours PRN Dyspepsia  bisacodyl 5 milliGRAM(s) Oral daily PRN Constipation  melatonin 3 milliGRAM(s) Oral at bedtime PRN Insomnia  morphine  - Injectable 2 milliGRAM(s) IV Push every 4 hours PRN Moderate Pain (4 - 6)  morphine  - Injectable 4 milliGRAM(s) IV Push every 4 hours PRN Severe Pain (7 - 10)  ondansetron Injectable 4 milliGRAM(s) IV Push every 8 hours PRN Nausea and/or Vomiting    ___________  Active diet:  Diet, Regular:   High Fiber (HIFIBER)  Supplement Feeding Modality:  Oral  Ensure Plus High Protein Cans or Servings Per Day:  2       Frequency:  Daily  ___________________    ==================>> VITAL SIGNS <<==================    T(C): 36.9 (10-19-22 @ 13:03), Max: 36.9 (10-19-22 @ 13:03)  HR: 73 (10-19-22 @ 13:03) (71 - 89)  BP: 110/63 (10-19-22 @ 13:03) (110/63 - 136/76)  RR: 16 (10-19-22 @ 13:03) (16 - 17)  SpO2: 100% (10-19-22 @ 13:03) (100% - 100%)     POCT Blood Glucose.: 74 mg/dL (19 Oct 2022 07:50)     ==================>> LAB AND IMAGING <<==================                        8.0    7.94  )-----------( 345      ( 19 Oct 2022 05:57 )             26.9        10-19    137  |  102  |  20  ----------------------------<  86  4.6   |  24  |  0.88    Ca    10.3      19 Oct 2022 05:57  Phos  3.4     10-18  Mg     2.10     10-18    TPro  7.0  /  Alb  3.5  /  TBili  <0.2  /  DBili  x   /  AST  13  /  ALT  8   /  AlkPhos  96  10-19                  TSH:      1.51   (10-18-22)           HgA1C:   (06-29-22)          (06-29-22)      5.6,   (06-28-22)          (06-28-22)      5.7    ___________________________________________________________________________________  ===============>>  A S S E S S M E N T   A N D   P L A N <<===============  ------------------------------------------------------------------------------------------    · Assessment	  56 year old female with PMHx of Asthma, Anemia, Bladder cancer with 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     Admitted for further evaluation.     Problem/Plan - 1:  ·  Problem: low back pain.   ·  Plan: acute on chronic, involving both hips, with left worse than right. Affecting mobility. Not controlled by Tramadol and Tylenol.   CT scan of abdomen and pelvis: no evidence of osseous metastatic disease.  s/p morphine 2 mg IV in ED.     - Started on Morphine but she is reluctant to take narcotics. Visibly upset about the pain. Will avoid Tramadol and NSAIDs for now in setting of hemoptysis and SINGLE functioning kidney   - Palliative / pain mgmt consult     Problem/Plan - 2:  ·  Problem: Hemoptysis.   ·  Plan: patient endorsed same earlier, in the setting of cancer metastatic to the lungs  no episodes since admission   CT demonstrates: Numerous bilateral pulmonary metastases; mild interval increase in size of a few of the largest pulmonary metastases compared to 9/20/2022.  - Monitor for now  - pt planned for OP folowup with oncology for treatment / chemo vs immunotherapy     Problem/Plan - 3:  ·  Problem: Hypoglycemia.   ·  Plan: glucose to 59 via FS; non-diabetic, s/p D-50 in the ED  diet prescribed, along with supplements  - Monitor fingersticks.    Problem/Plan - 4:  ·  Problem: Chest pain.   ·  Plan: C/o chest pain earlier in the morning   ECG similar to previous and no acute ischemic changes   trop negative  pain not cardiac   - Monitor on tele   - repeat ECG if pt has chest pain.    Problem/Plan - 5:  ·  Problem: Primary cancer of bladder with metastasis to other site.   ·  Plan: with metastases and has left hydronephrosis  s/p right nephrostomy tube in place draining w/o difficulty  - Oncology consult / follow up ( pt states she will be changing oncologists from [facility], Dr Jones, to [facility] / mian)     Problem/Plan - 6:  ·  Problem: Slow transit constipation.   ·  Plan: no bowel movement for the past 3 days while on opiate  - Bowel regimen   - Monitor BM.    Problem/Plan - 7:  ·  Problem: Protein calorie malnutrition.   ·  Plan: Poor PO intake, confused/frustrated as to what to eat at present. no longer taking MVI; will restart  - Nutrition consult     Problem/Plan - 8:  ·  Problem: Prophylactic measure.   ·  Plan: DVT ppx: will do SCD for now, will switch to Lovenox  no PT needs.    --------------------------------------------  Case discussed with pt, NP  Education given on findings and plan of care  ___________________________  H. NIEVES Ochoa.  Pager: [phone]       _________________________________________________________________________________________  ========>>  M E D I C A L   A T T E N D I N G    F O L L O W  U P  N O T E  <<=========  -----------------------------------------------------------------------------------------------------    - Patient seen and examined by me earlier today.   - In summary,  JEF HOUSE is a 56y year old woman admitted with back / flank pain  - Patient today overall doing ok, comfortable, eating fairly      ==================>> REVIEW OF SYSTEM <<=================    GEN: no fever, no chills, on and off pain as above   RESP: no SOB, no cough, no sputum, no more hemoptysis reported   CVS: no chest pain, no palpitations, no edema  GI: no abdominal pain, no nausea, no constipation, no diarrhea  : no dysuria, no frequency, no hematuria in tube or voiding      + occasional chronic vaginal bleeding   Neuro: no headache, no dizziness  Derm : no itching, no rash    ==================>> PHYSICAL EXAM <<=================    GEN: A&O X 3 , NAD , comfortable, pleasant, calm , cachectic   HEENT: NCAT, PERRL, MMM, hearing intact  Neck: supple , no JVD appreciated  CVS: S1S2 , regular , No M/R/G appreciated  PULM: CTA B/L,  no W/R/R appreciated  ABD.: soft. non tender, non distended,  bowel sounds present  Extrem: intact pulses , no edema     nephrostomy in place and draining to bag   PSYCH : normal mood,  not anxious                            ( Note Written / Date of service :  10-19-22 )    ==================>> MEDICATIONS <<====================    MEDICATIONS  (STANDING):  ascorbic acid 500 milliGRAM(s) Oral daily  chlorhexidine 2% Cloths 1 Application(s) Topical <User Schedule>  cholecalciferol 2000 Unit(s) Oral daily  enoxaparin Injectable 40 milliGRAM(s) SubCutaneous every 24 hours  ferrous    sulfate 325 milliGRAM(s) Oral daily  folic acid 1 milliGRAM(s) Oral daily  influenza   Vaccine 0.5 milliLiter(s) IntraMuscular once  ketorolac   Injectable 15 milliGRAM(s) IV Push once  lidocaine   4% Patch 1 Patch Transdermal every 24 hours  pantoprazole    Tablet 40 milliGRAM(s) Oral before breakfast  senna 2 Tablet(s) Oral at bedtime    MEDICATIONS  (PRN):  acetaminophen     Tablet .. 650 milliGRAM(s) Oral every 6 hours PRN Mild Pain (1 - 3)  aluminum hydroxide/magnesium hydroxide/simethicone Suspension 30 milliLiter(s) Oral every 4 hours PRN Dyspepsia  bisacodyl 5 milliGRAM(s) Oral daily PRN Constipation  melatonin 3 milliGRAM(s) Oral at bedtime PRN Insomnia  morphine  - Injectable 2 milliGRAM(s) IV Push every 4 hours PRN Moderate Pain (4 - 6)  morphine  - Injectable 4 milliGRAM(s) IV Push every 4 hours PRN Severe Pain (7 - 10)  ondansetron Injectable 4 milliGRAM(s) IV Push every 8 hours PRN Nausea and/or Vomiting    ___________  Active diet:  Diet, Regular:   High Fiber (HIFIBER)  Supplement Feeding Modality:  Oral  Ensure Plus High Protein Cans or Servings Per Day:  2       Frequency:  Daily  ___________________    ==================>> VITAL SIGNS <<==================    T(C): 36.9 (10-19-22 @ 13:03), Max: 36.9 (10-19-22 @ 13:03)  HR: 73 (10-19-22 @ 13:03) (71 - 89)  BP: 110/63 (10-19-22 @ 13:03) (110/63 - 136/76)  RR: 16 (10-19-22 @ 13:03) (16 - 17)  SpO2: 100% (10-19-22 @ 13:03) (100% - 100%)     POCT Blood Glucose.: 74 mg/dL (19 Oct 2022 07:50)     ==================>> LAB AND IMAGING <<==================                        8.0    7.94  )-----------( 345      ( 19 Oct 2022 05:57 )             26.9        10-19    137  |  102  |  20  ----------------------------<  86  4.6   |  24  |  0.88    Ca    10.3      19 Oct 2022 05:57  Phos  3.4     10-18  Mg     2.10     10-18    TPro  7.0  /  Alb  3.5  /  TBili  <0.2  /  DBili  x   /  AST  13  /  ALT  8   /  AlkPhos  96  10-19                  TSH:      1.51   (10-18-22)           HgA1C:   (06-29-22)          (06-29-22)      5.6,   (06-28-22)          (06-28-22)      5.7    ___________________________________________________________________________________  ===============>>  A S S E S S M E N T   A N D   P L A N <<===============  ------------------------------------------------------------------------------------------    · Assessment	  56 year old female with PMHx of Asthma, Anemia, Bladder cancer with 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     Admitted for further evaluation.     Problem/Plan - 1:  ·  Problem: low back pain.   ·  Plan: acute on chronic, involving both hips, with left worse than right. Affecting mobility. Not controlled by Tramadol and Tylenol.   CT scan of abdomen and pelvis: no evidence of osseous metastatic disease.  s/p morphine 2 mg IV in ED.     - Started on Morphine but she is reluctant to take narcotics. Visibly upset about the pain. Will avoid Tramadol and NSAIDs for now in setting of hemoptysis and SINGLE functioning kidney   - Palliative / pain mgmt consult     Problem/Plan - 2:  ·  Problem: Hemoptysis.   ·  Plan: patient endorsed same earlier, in the setting of cancer metastatic to the lungs  no episodes since admission   CT demonstrates: Numerous bilateral pulmonary metastases; mild interval increase in size of a few of the largest pulmonary metastases compared to 9/20/2022.  - Monitor for now  - pt planned for OP folowup with oncology for treatment / chemo vs immunotherapy     Problem/Plan - 3:  ·  Problem: Hypoglycemia.   ·  Plan: glucose to 59 via FS; non-diabetic, s/p D-50 in the ED  diet prescribed, along with supplements  - Monitor fingersticks.    Problem/Plan - 4:  ·  Problem: Chest pain.   ·  Plan: C/o chest pain earlier in the morning   ECG similar to previous and no acute ischemic changes   trop negative  pain not cardiac   - no need for tele   - repeat ECG if pt has chest pain.    Problem/Plan - 5:  ·  Problem: Primary cancer of bladder with metastasis to other site.   ·  Plan: with metastases and has left hydronephrosis  s/p right nephrostomy tube in place draining w/o difficulty  - Oncology consult / follow up ( pt states she will be changing oncologists from [facility], Dr Jones, to [facility] / mian)     Problem/Plan - 6:  ·  Problem: Slow transit constipation.   ·  Plan: no bowel movement for the past 3 days while on opiate  - Bowel regimen   - Monitor BM.    Problem/Plan - 7:  ·  Problem: Protein calorie malnutrition.   ·  Plan: Poor PO intake, confused/frustrated as to what to eat at present. no longer taking MVI; will restart  - Nutrition consult     Problem/Plan - 8:  ·  Problem: Prophylactic measure.   ·  Plan: DVT ppx: will do SCD for now, will switch to Lovenox  no PT needs.    --------------------------------------------  Case discussed with pt, NP  Education given on findings and plan of care  ___________________________  H. NIEVES Ochoa.  Pager: [phone]

## 2022-10-19 NOTE — CONSULT NOTE ADULT - PROBLEM SELECTOR RECOMMENDATION 9
- XRAY Hip - No hip fractures or dislocations Intact pelvic and obturator rings and symmetrically aligned and spaced SI joints and pubic symphysis. Preserved bilateral hip joint spaces. No gross radiographic evidence for AVN.  No destructive osseous lesions or periosteal reaction. Nephrostomy tube with interconnecting hub courses along peripheral right flank/hip/thigh region.  - Patient reports having back pain for the past few months. She had tried tramadol outpatient without much relief. Also has tried Percocet and states she did not tolerate it well, and does not wish to try it again despite discussing can consider trying Oxycodone IR 2.5mg PRN.   Patient expressed her concerns about using opiates, and would prefer not to use them if possible.   Addressed her questions/concerns about pain medications. She agreed to trial PO Morphine solution as she tolerated IV morphine earlier and GFR-78 today  - PO Tylenol 650mg q6-8 PRN mild-moderate pain   - Start PO Morphine Solution 5mg q4 PRN moderate-severe pain (hold for hypotension, oversedation, respiratory depression)  - Bowel regimen while on opiates  - Recommend ongoing further workup of etiology of back pain, including further imaging if not already completed recently inpatient/outpatient.

## 2022-10-20 NOTE — CONSULT NOTE ADULT - ASSESSMENT
56f with bladder cancer, initially followed at [facility], and the at [facility], she appears to have developed hematuria in 2019 with a negative subsequent cystoscopy and PET/CT per patient previously. Then in 2020, she was found to have a bladder mass which was biopsied and initially treated with chemotherapy at [facility] then discontinued due to pyelonephritis. She switched oncologist and currently followed with Dr Hoyt at [facility]. She was most recently on carboplatin/gemcitabine, with last dose given in May 2022 (unable to get treatment due to recurrent hospitalizations since), she recently switched oncologist again and saw Dr Alcocer at [facility], she is hoping to transfer care to Dr Kan, she is presenting for pain, has had hemoptysis as well     CT chest/a/p  IMPRESSION:  1.  Large bladder mass is without significant change. Severe chronic   hydronephrosis of the left kidney is unchanged. Right nephrostomy tube in   place; no right hydronephrosis.  2.  Numerous bilateral pulmonary metastases; mild interval increase in   size of a few of the largest pulmonary metastases compared to 9/20/2022.  3.  Progression of liver metastases.  4.  No evidence of osseous metastatic disease.    -Pal care input appreciated  -Has been off treatment, established care at [facility] for follow up. Will need outpatient evaluation for assessment of performance status and candidacy for further chemo or immunotherapy. Records have been requested for review at [facility].  -No hemoptysis since admission, monitor  -Supportive care, pain control, Nutrition, PT, DVT ppx  -Outpatient oncology f/u    Will follow. Please do not hesitate to call back with questions.     Caroline Ma MD  Medical Oncology Attending  C: [phone]     time spent on direct patient care, interdisciplinary discussions and chart review.

## 2022-10-20 NOTE — CONSULT NOTE ADULT - SUBJECTIVE AND OBJECTIVE BOX
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  Seen and evaluated at bedside; appears to be experiencing painful discomfort of the back, hips and nephrostomy tube site.  Patient reports worsening pain (10/10) left lower back/hip pain, which spread to involve the right side, approximately one week ago.  Also suffers with pain of the nephrostomy tube site.  Initially palliated with Tramadol 25 mg, but no longer effective, even when taken with Tylenol ~ 2 hours apart.  Reports sleep difficulty due to pain, as well as problems ambulating; has to use a cane at times.  Experiences occasional cramping of the legs - more frequent occurrences lately.  A bit frustrated because of side effects of dietary intake and medications.  Comments on significant weight loss, in the setting of malignancy.  Reports of some hemoptysis and mild shortness of breath with ambulation.    Vital signs upon ED presentation as follows: BP = 133/83, HR = 85, RR = 16, T = 36.8 C (98.2 F), O2 Sat = 100% on RA.  Diagnosed with Chronic Back Pain and prescribed Tylenol 1 gram, Ketorolac 15 mg, dextrose 50 mL (FS of 59) and Zofran 4 mg IV x one in the ED. (17 Oct 2022 21:38)     ROS: as above     PAST MEDICAL & SURGICAL HISTORY:  Anemia      Asthma      HLD (hyperlipidemia)      Pacemaker  [employer]      Bladder cancer      HTN (hypertension)      Parathyroid tumor      Liver cyst      Hydronephrosis  has right Nephrostomy tube - dressing intact      Bradycardia      History of renal calculi      Birthmark      H/O myomectomy      Presence of cardiac pacemaker      H/O hydronephrosis  s/p Right Perc nephrostomy tube placement 02/2021, exchange 06/2021          SOCIAL HISTORY:    FAMILY HISTORY:  Family history of prostate cancer (Father)    Family history of CHF (congestive heart failure) (Father)    Family history of atrial fibrillation (Father)        MEDICATIONS  (STANDING):  ascorbic acid 500 milliGRAM(s) Oral daily  chlorhexidine 2% Cloths 1 Application(s) Topical <User Schedule>  cholecalciferol 2000 Unit(s) Oral daily  enoxaparin Injectable 40 milliGRAM(s) SubCutaneous every 24 hours  ferrous    sulfate 325 milliGRAM(s) Oral daily  folic acid 1 milliGRAM(s) Oral daily  influenza   Vaccine 0.5 milliLiter(s) IntraMuscular once  ketorolac   Injectable 15 milliGRAM(s) IV Push once  lidocaine   4% Patch 1 Patch Transdermal every 24 hours  pantoprazole    Tablet 40 milliGRAM(s) Oral before breakfast  senna 2 Tablet(s) Oral at bedtime    MEDICATIONS  (PRN):  acetaminophen     Tablet .. 650 milliGRAM(s) Oral every 6 hours PRN Mild Pain (1 - 3)  aluminum hydroxide/magnesium hydroxide/simethicone Suspension 30 milliLiter(s) Oral every 4 hours PRN Dyspepsia  bisacodyl 5 milliGRAM(s) Oral daily PRN Constipation  melatonin 3 milliGRAM(s) Oral at bedtime PRN Insomnia  morphine   Solution 5 milliGRAM(s) Oral every 4 hours PRN Moderate and Severe pain  ondansetron Injectable 4 milliGRAM(s) IV Push every 8 hours PRN Nausea and/or Vomiting      Allergies    No Known Allergies    Intolerances        Vital Signs Last 24 Hrs  T(C): 36.9 (20 Oct 2022 05:05), Max: 36.9 (19 Oct 2022 13:03)  T(F): 98.4 (20 Oct 2022 05:05), Max: 98.4 (19 Oct 2022 13:03)  HR: 77 (20 Oct 2022 05:05) (73 - 79)  BP: 106/62 (20 Oct 2022 05:05) (106/62 - 122/63)  BP(mean): --  RR: 16 (20 Oct 2022 05:05) (16 - 17)  SpO2: 100% (20 Oct 2022 05:05) (100% - 100%)    Parameters below as of 20 Oct 2022 05:05  Patient On (Oxygen Delivery Method): room air        PHYSICAL EXAM  General: adult in NAD  HEENT: clear oropharynx, anicteric sclera, pink conjunctiva  Neck: supple  CV: normal S1/S2 with no murmur rubs or gallops  Lungs: positive air movement b/l ant lungs, clear to auscultation, no wheezes, no rales  Abdomen: soft non-tender non-distended, no hepatosplenomegaly  Ext: no clubbing cyanosis or edema  Skin: no rashes and no petechiae  Neuro: alert and oriented X 3, none focal    LABS:                          8.8    10.08 )-----------( 399      ( 20 Oct 2022 06:40 )             30.0         Mean Cell Volume : 84.0 fL  Mean Cell Hemoglobin : 24.6 pg  Mean Cell Hemoglobin Concentration : 29.3 gm/dL  Auto Neutrophil # : x  Auto Lymphocyte # : x  Auto Monocyte # : x  Auto Eosinophil # : x  Auto Basophil # : x  Auto Neutrophil % : x  Auto Lymphocyte % : x  Auto Monocyte % : x  Auto Eosinophil % : x  Auto Basophil % : x      Serial CBC's  10-20 @ 06:40  Hct-30.0 / Hgb-8.8 / Plat-399 / RBC-3.57 / WBC-10.08  Serial CBC's  10-19 @ 05:57  Hct-26.9 / Hgb-8.0 / Plat-345 / RBC-3.19 / WBC-7.94  Serial CBC's  10-18 @ 18:43  Hct-29.0 / Hgb-8.6 / Plat-398 / RBC-3.42 / WBC-9.25  Serial CBC's  10-17 @ 14:52  Hct-30.0 / Hgb-8.9 / Plat-357 / RBC-3.57 / WBC-9.75      10-20    136  |  99  |  21  ----------------------------<  84  4.1   |  24  |  0.94    Ca    10.4      20 Oct 2022 06:40    TPro  7.0  /  Alb  3.5  /  TBili  <0.2  /  DBili  x   /  AST  13  /  ALT  8   /  AlkPhos  96  10-19                      RADIOLOGY & ADDITIONAL STUDIES:

## 2022-10-20 NOTE — PROGRESS NOTE ADULT - SUBJECTIVE AND OBJECTIVE BOX
SUBJECTIVE AND OBJECTIVE:  Patient seen and examined at bedside. Patient being followed up for pain. Reports she tolerated the prn morphine dose with pain relief afterwards and able to sleep as she has not been able to sleep due to back pain the past few days.      INTERVAL HPI/OVERNIGHT EVENTS:  In past 24 hours, received 2 prn doses of PO Morphine Solution and 1 prn dose of IV Morphine     Allergies    No Known Allergies    Intolerances    MEDICATIONS  (STANDING):  ascorbic acid 500 milliGRAM(s) Oral daily  chlorhexidine 2% Cloths 1 Application(s) Topical <User Schedule>  cholecalciferol 2000 Unit(s) Oral daily  enoxaparin Injectable 40 milliGRAM(s) SubCutaneous every 24 hours  ferrous    sulfate 325 milliGRAM(s) Oral daily  folic acid 1 milliGRAM(s) Oral daily  influenza   Vaccine 0.5 milliLiter(s) IntraMuscular once  ketorolac   Injectable 15 milliGRAM(s) IV Push once  lidocaine   4% Patch 1 Patch Transdermal every 24 hours  pantoprazole    Tablet 40 milliGRAM(s) Oral before breakfast  senna 2 Tablet(s) Oral at bedtime    MEDICATIONS  (PRN):  acetaminophen     Tablet .. 650 milliGRAM(s) Oral every 6 hours PRN Mild Pain (1 - 3)  aluminum hydroxide/magnesium hydroxide/simethicone Suspension 30 milliLiter(s) Oral every 4 hours PRN Dyspepsia  bisacodyl 5 milliGRAM(s) Oral daily PRN Constipation  melatonin 3 milliGRAM(s) Oral at bedtime PRN Insomnia  morphine   Solution 5 milliGRAM(s) Oral every 4 hours PRN Moderate and Severe pain  ondansetron Injectable 4 milliGRAM(s) IV Push every 8 hours PRN Nausea and/or Vomiting      ITEMS UNCHECKED ARE NOT PRESENT    PRESENT SYMPTOMS: [ ]Unable to self-report due to altered mental status- see [ ] CPOT [ ] PAINADS [ ] RDOS  Source if other than patient:  [ ]Family   [ ]Team     Pain: [x ]yes [ ]no  QOL impact - moderate  Location -    left back                Aggravating factors  - movement   Quality - sharp  Radiation - right back and left later side   Timing- intermittent   Severity (0-10 scale): 8  Minimal acceptable level (0-10 scale): 2    Dyspnea:                           [ ]Mild [ ]Moderate [ ]Severe  Anxiety:                             [x ]Mild [ ]Moderate [ ]Severe  Agitation:                          [ ]Mild [ ]Moderate [ ]Severe  Fatigue:                             [ ]Mild [ ]Moderate [ ]Severe  Nausea:                             [ ]Mild [ ]Moderate [ ]Severe  Loss of appetite:              [ ]Mild [ ]Moderate [ ]Severe  Constipation:                   [ ]Mild [ ]Moderate [ ]Severe  Diarrhea:                          [ ]Mild [ ]Moderate [ ]Severe      CPOT:    https://www.[facility].org/getattachment/[email or web address]/Critical-Care-Pain-Observation-Tool-(CPOT)    PCSSQ[Palliative Care Spiritual Screening Question]   Severity (0-10):  Score of 4 or > indicate consideration of Chaplaincy referral.  Chaplaincy Referral: [ ] yes [ ] refused [ ] following [x ] deferred    Caregiver Hardy? : [ ] yes [ ] no [ ] Declined [x ] Deferred              Social work referral [ ] Patient & Family Centered Care Referral [ ]     Anticipatory Grief present?:  [ ] yes [ ] no  [ x] Deferred                  Social work referral [ ] Chaplaincy Referral[ ]    Other Symptoms:  [x ]All other review of systems negative     PHYSICAL EXAM:  Vital Signs Last 24 Hrs  T(C): 36.9 (20 Oct 2022 05:05), Max: 36.9 (19 Oct 2022 13:03)  T(F): 98.4 (20 Oct 2022 05:05), Max: 98.4 (19 Oct 2022 13:03)  HR: 77 (20 Oct 2022 05:05) (73 - 79)  BP: 106/62 (20 Oct 2022 05:05) (106/62 - 122/63)  BP(mean): --  RR: 16 (20 Oct 2022 05:05) (16 - 17)  SpO2: 100% (20 Oct 2022 05:05) (100% - 100%)    Parameters below as of 20 Oct 2022 05:05  Patient On (Oxygen Delivery Method): room air         I&O's Summary     GENERAL:  [ x]Alert  [x ]Oriented x   [ ]Lethargic  [ ]Cachexia  [ ]Unarousable  [ x]Verbal  [ ]Non-Verbal    Behavioral:   [x ] Anxiety  [ ] Delirium [ ] Agitation [ ] Calm     HEENT:  [ x]Normal  [ ] Temporal Wasting  [ ]Dry mouth   [ ]ET Tube/Trach  [ ]Oral lesions  [ ] Mucositis  PULMONARY:   [x ]Clear [ ]Tachypnea  [ ]Audible excessive secretions   [ ]Rhonchi        [ ]Right [ ]Left [ ]Bilateral  [ ]Crackles        [ ]Right [ ]Left [ ]Bilateral  [ ]Wheezing     [ ]Right [ ]Left [ ]Bilateral  [ ]Diminished breath sounds [ ]right [ ]left [ ]bilateral  CARDIOVASCULAR:    [x ]Regular [ ]Irregular [ ]Tachy  [ ]Bradley [ ]Murmur [ ]Other  GASTROINTESTINAL:  [x ]Soft  [ ]Distended   [ ]+BS  [ x]Non tender [ ]Tender  [ ]PEG [ ]OGT/ NGT  Last BM: 10/19  GENITOURINARY: Nephrostomy tube   [ ]Normal [ ] Incontinent   [ ]Oliguria/Anuria   [ ]Lloyd  MUSCULOSKELETAL:   [ x]Normal   [ ]Weakness  [ ]Bed/Wheelchair bound [ ]Edema  [  ] amputation  [  ] contraction  NEUROLOGIC:   [ x]No focal deficits  [ ]Cognitive impairment  [ ]Dysphagia [ ]Dysarthria [ ]Paresis [ ]Other   SKIN: See Nursing Skin Assessment for further details  [x ]Normal    [ ]Rash  [ ]Pressure ulcer(s)       Present on admission [ ]y [ ]n   [  ]  Wound    [  ] hyperpigmentation    CRITICAL CARE:  [ ]Shock Present  [ ]Septic [ ]Cardiogenic [ ]Neurologic [ ]Hypovolemic  [ ]Vasopressors [ ]Inotropes  [ ]Respiratory failure present [ ]Mechanical Ventilation [ ]Non-invasive ventilatory support [ ]High-Flow   [ ]Acute  [ ]Chronic [ ]Hypoxic  [ ]Hypercarbic [ ]Other  [ ]Other organ failure     LABS:                        8.8    10.08 )-----------( 399      ( 20 Oct 2022 06:40 )             30.0   10-20    136  |  99  |  21  ----------------------------<  84  4.1   |  24  |  0.94    Ca    10.4      20 Oct 2022 06:40    TPro  7.0  /  Alb  3.5  /  TBili  <0.2  /  DBili  x   /  AST  13  /  ALT  8   /  AlkPhos  96  10-19      CAPILLARY BLOOD GLUCOSE      RADIOLOGY & ADDITIONAL STUDIES:    Protein Calorie Malnutrition Present: [ ]mild [ ]moderate [x ]severe [ ]underweight [ ]morbid obesity  https://www.andeal.org/vault/2595/web/files/ONC/Table_Clinical%20Characteristics%20to%20Document%20Malnutrition-White%20JV%20et%20al%244503.pdf    Height (cm): 162.6 (10-18-22 @ 18:03), 162.6 (09-19-22 @ 14:23), 162.6 (08-15-22 @ 15:45)  Weight (kg): 38 (10-18-22 @ 18:03), 38.3 (09-19-22 @ 14:23), 38.6 (08-15-22 @ 15:45)  BMI (kg/m2): 14.4 (10-18-22 @ 18:03), 14.5 (09-19-22 @ 14:23), 14.6 (08-15-22 @ 15:45)    [ ]PPSV2 < or = 30%  [ ]significant weight loss [ ]poor nutritional intake [ ]anasarca   [ ]Artificial Nutrition    REFERRALS:   [ ]Chaplaincy  [ ]Hospice  [ ]Child Life  [ ]Social Work  [x ]Case management [ ]Holistic Therapy      SUBJECTIVE AND OBJECTIVE:  Patient seen and examined at bedside. Patient being followed up for pain. Reports she tolerated the prn morphine dose with pain relief afterwards and able to sleep as she has not been able to sleep due to back pain the past few days.      INTERVAL HPI/OVERNIGHT EVENTS:  In past 24 hours, received 2 prn doses of PO Morphine Solution and 1 prn dose of IV Morphine     Allergies    No Known Allergies    Intolerances    MEDICATIONS  (STANDING):  ascorbic acid 500 milliGRAM(s) Oral daily  chlorhexidine 2% Cloths 1 Application(s) Topical <User Schedule>  cholecalciferol 2000 Unit(s) Oral daily  enoxaparin Injectable 40 milliGRAM(s) SubCutaneous every 24 hours  ferrous    sulfate 325 milliGRAM(s) Oral daily  folic acid 1 milliGRAM(s) Oral daily  influenza   Vaccine 0.5 milliLiter(s) IntraMuscular once  ketorolac   Injectable 15 milliGRAM(s) IV Push once  lidocaine   4% Patch 1 Patch Transdermal every 24 hours  pantoprazole    Tablet 40 milliGRAM(s) Oral before breakfast  senna 2 Tablet(s) Oral at bedtime    MEDICATIONS  (PRN):  acetaminophen     Tablet .. 650 milliGRAM(s) Oral every 6 hours PRN Mild Pain (1 - 3)  aluminum hydroxide/magnesium hydroxide/simethicone Suspension 30 milliLiter(s) Oral every 4 hours PRN Dyspepsia  bisacodyl 5 milliGRAM(s) Oral daily PRN Constipation  melatonin 3 milliGRAM(s) Oral at bedtime PRN Insomnia  morphine   Solution 5 milliGRAM(s) Oral every 4 hours PRN Moderate and Severe pain  ondansetron Injectable 4 milliGRAM(s) IV Push every 8 hours PRN Nausea and/or Vomiting      ITEMS UNCHECKED ARE NOT PRESENT    PRESENT SYMPTOMS: [ ]Unable to self-report due to altered mental status- see [ ] CPOT [ ] PAINADS [ ] RDOS  Source if other than patient:  [ ]Family   [ ]Team     Pain: [x ]yes [ ]no  QOL impact - moderate  Location -    left back                Aggravating factors  - movement   Quality - sharp  Radiation - right back and left later side   Timing- intermittent   Severity (0-10 scale): 8  Minimal acceptable level (0-10 scale): 2    Dyspnea:                           [ ]Mild [ ]Moderate [ ]Severe  Anxiety:                             [x ]Mild [ ]Moderate [ ]Severe  Agitation:                          [ ]Mild [ ]Moderate [ ]Severe  Fatigue:                             [ ]Mild [ ]Moderate [ ]Severe  Nausea:                             [ ]Mild [ ]Moderate [ ]Severe  Loss of appetite:              [ ]Mild [ ]Moderate [ ]Severe  Constipation:                   [ ]Mild [ ]Moderate [ ]Severe  Diarrhea:                          [ ]Mild [ ]Moderate [ ]Severe      CPOT:    https://www.[facility].org/getattachment/[email or web address]/Critical-Care-Pain-Observation-Tool-(CPOT)    PCSSQ[Palliative Care Spiritual Screening Question]   Severity (0-10):  Score of 4 or > indicate consideration of Chaplaincy referral.  Chaplaincy Referral: [ ] yes [ ] refused [ ] following [x ] deferred    Caregiver Leola? : [ ] yes [ ] no [ ] Declined [x ] Deferred              Social work referral [ ] Patient & Family Centered Care Referral [ ]     Anticipatory Grief present?:  [ ] yes [ ] no  [ x] Deferred                  Social work referral [ ] Chaplaincy Referral[ ]    Other Symptoms:  [x ]All other review of systems negative     PHYSICAL EXAM:  Vital Signs Last 24 Hrs  T(C): 36.9 (20 Oct 2022 05:05), Max: 36.9 (19 Oct 2022 13:03)  T(F): 98.4 (20 Oct 2022 05:05), Max: 98.4 (19 Oct 2022 13:03)  HR: 77 (20 Oct 2022 05:05) (73 - 79)  BP: 106/62 (20 Oct 2022 05:05) (106/62 - 122/63)  BP(mean): --  RR: 16 (20 Oct 2022 05:05) (16 - 17)  SpO2: 100% (20 Oct 2022 05:05) (100% - 100%)    Parameters below as of 20 Oct 2022 05:05  Patient On (Oxygen Delivery Method): room air         I&O's Summary     GENERAL:  [ x]Alert  [x ]Oriented x   [ ]Lethargic  [ ]Cachexia  [ ]Unarousable  [ x]Verbal  [ ]Non-Verbal    Behavioral:   [x ] Anxiety  [ ] Delirium [ ] Agitation [ ] Calm     HEENT:  [ x]Normal  [ ] Temporal Wasting  [ ]Dry mouth   [ ]ET Tube/Trach  [ ]Oral lesions  [ ] Mucositis  PULMONARY:   [x ]Clear [ ]Tachypnea  [ ]Audible excessive secretions   [ ]Rhonchi        [ ]Right [ ]Left [ ]Bilateral  [ ]Crackles        [ ]Right [ ]Left [ ]Bilateral  [ ]Wheezing     [ ]Right [ ]Left [ ]Bilateral  [ ]Diminished breath sounds [ ]right [ ]left [ ]bilateral  CARDIOVASCULAR:    [x ]Regular [ ]Irregular [ ]Tachy  [ ]Bradley [ ]Murmur [ ]Other  GASTROINTESTINAL:  [x ]Soft  [ ]Distended   [ ]+BS  [ x]Non tender [ ]Tender  [ ]PEG [ ]OGT/ NGT  Last BM: 10/19  GENITOURINARY: Nephrostomy tube   [ ]Normal [ ] Incontinent   [ ]Oliguria/Anuria   [ ]Lloyd  MUSCULOSKELETAL:   [ x]Normal   [ ]Weakness  [ ]Bed/Wheelchair bound [ ]Edema  [  ] amputation  [  ] contraction  NEUROLOGIC:   [ x]No focal deficits  [ ]Cognitive impairment  [ ]Dysphagia [ ]Dysarthria [ ]Paresis [ ]Other   SKIN: See Nursing Skin Assessment for further details  [x ]Normal    [ ]Rash  [ ]Pressure ulcer(s)       Present on admission [ ]y [ ]n   [  ]  Wound    [  ] hyperpigmentation    CRITICAL CARE:  [ ]Shock Present  [ ]Septic [ ]Cardiogenic [ ]Neurologic [ ]Hypovolemic  [ ]Vasopressors [ ]Inotropes  [ ]Respiratory failure present [ ]Mechanical Ventilation [ ]Non-invasive ventilatory support [ ]High-Flow   [ ]Acute  [ ]Chronic [ ]Hypoxic  [ ]Hypercarbic [ ]Other  [ ]Other organ failure     LABS:                        8.8    10.08 )-----------( 399      ( 20 Oct 2022 06:40 )             30.0   10-20    136  |  99  |  21  ----------------------------<  84  4.1   |  24  |  0.94    Ca    10.4      20 Oct 2022 06:40    TPro  7.0  /  Alb  3.5  /  TBili  <0.2  /  DBili  x   /  AST  13  /  ALT  8   /  AlkPhos  96  10-19      CAPILLARY BLOOD GLUCOSE      RADIOLOGY & ADDITIONAL STUDIES: reviewed     Protein Calorie Malnutrition Present: [ ]mild [ ]moderate [x ]severe [ ]underweight [ ]morbid obesity  https://www.andeal.org/vault/2637/web/files/ONC/Table_Clinical%20Characteristics%20to%20Document%20Malnutrition-White%20JV%20et%20al%774650.pdf    Height (cm): 162.6 (10-18-22 @ 18:03), 162.6 (09-19-22 @ 14:23), 162.6 (08-15-22 @ 15:45)  Weight (kg): 38 (10-18-22 @ 18:03), 38.3 (09-19-22 @ 14:23), 38.6 (08-15-22 @ 15:45)  BMI (kg/m2): 14.4 (10-18-22 @ 18:03), 14.5 (09-19-22 @ 14:23), 14.6 (08-15-22 @ 15:45)    [ ]PPSV2 < or = 30%  [ ]significant weight loss [ ]poor nutritional intake [ ]anasarca   [ ]Artificial Nutrition    REFERRALS:   [ ]Chaplaincy  [ ]Hospice  [ ]Child Life  [ ]Social Work  [x ]Case management [ ]Holistic Therapy

## 2022-10-20 NOTE — PROGRESS NOTE ADULT - PROBLEM SELECTOR PLAN 5
Patient discussed during oncology interdisciplinary rounds  Symptom management recommendations discussed with primary team     Thank you for allowing us to participate in your patient's care. Please page [phone] for any questions/concerns.

## 2022-10-21 NOTE — DISCHARGE NOTE NURSING/CASE MANAGEMENT/SOCIAL WORK - PATIENT PORTAL LINK FT
You can access the FollowMyHealth Patient Portal offered by [facility] by registering at the following website: [email or web address]. By joining [facility]’s FollowMyHealth portal, you will also be able to view your health information using other applications (apps) compatible with our system.

## 2022-10-21 NOTE — DISCHARGE NOTE PROVIDER - NSDCMRMEDTOKEN_GEN_ALL_CORE_FT
Ferrex-150 oral capsule: 1 cap(s) orally once a day  folic acid 1 mg oral tablet: 1 tab(s) orally once a day  morphine 10 mg/5 mL oral solution: 2.5 milliliter(s) orally every 4 hours, As needed, Moderate and Severe pain    VIVO PRICE CHECK PAGER [phone] MDD:15mL  traMADol 50 mg oral tablet: 0.5 tab(s) orally  Tylenol:    aluminum hydroxide-magnesium hydroxide 200 mg-200 mg/5 mL oral suspension: 30 milliliter(s) orally every 4 hours, As needed, Dyspepsia  Ferrex-150 oral capsule: 1 cap(s) orally once a day  folic acid 1 mg oral tablet: 1 tab(s) orally once a day  simethicone 80 mg oral tablet, chewable: 1 tab(s) orally 4 times a day, As needed, Gas  Tylenol:    aluminum hydroxide-magnesium hydroxide 200 mg-200 mg/5 mL oral suspension: 30 milliliter(s) orally every 4 hours, As needed, Dyspepsia  amoxicillin-clavulanate 500 mg-125 mg oral tablet: 1 tab(s) orally every 12 hours  bisacodyl 10 mg rectal suppository: 1 suppository(ies) rectal once a day, As needed, Constipation  bisacodyl 5 mg oral delayed release tablet: 1 tab(s) orally once a day (at bedtime)  Ferrex-150 oral capsule: 1 cap(s) orally once a day  folic acid 1 mg oral tablet: 1 tab(s) orally once a day  lidocaine 4% topical film: Apply topically to affected area once a day   morphine 10 mg/5 mL oral solution: 2.5 milliliter(s) orally every 4 hours, As Needed -Moderate and Severe pain MDD:20 mL per day   polyethylene glycol 3350 oral powder for reconstitution: 17 gram(s) orally once a day  senna leaf extract oral tablet: 2 tab(s) orally once a day (at bedtime)  Tylenol:

## 2022-10-21 NOTE — DISCHARGE NOTE NURSING/CASE MANAGEMENT/SOCIAL WORK - NSDCFUADDAPPT_GEN_ALL_CORE_FT
Please follow up as an outpatient with Dr. Kan at [facility].     Follow up with Dr. Trinidad, Urologist for outpatient management of bladder cancer. [phone]

## 2022-10-21 NOTE — PROGRESS NOTE ADULT - PROBLEM SELECTOR PLAN 5
Patient discussed during oncology interdisciplinary rounds  Symptom management recommendations discussed with primary team     Thank you for allowing us to participate in your patient's care. Please page [phone] for any questions/concerns. Patient discussed during oncology interdisciplinary rounds  Symptom management recommendations discussed with primary team     The patient's symptoms are well controlled. No acute symptoms at this time.   Thank you for allowing us to participate in your patient's care.  Patient to be discharged from [facility] team consult service today.   Please re-consult if we can be of further assistance, page [phone].

## 2022-10-21 NOTE — DISCHARGE NOTE PROVIDER - DETAILS OF MALNUTRITION DIAGNOSIS/DIAGNOSES
This patient has been assessed with a concern for Malnutrition and was treated during this hospitalization for the following Nutrition diagnosis/diagnoses:     -  10/19/2022: Severe protein-calorie malnutrition   -  10/19/2022: Underweight (BMI < 19)

## 2022-10-21 NOTE — PROGRESS NOTE ADULT - ASSESSMENT
_________________________________________________________________________________________  ========>>  M E D I C A L   A T T E N D I N G    F O L L O W  U P  N O T E  <<=========  -----------------------------------------------------------------------------------------------------    - Patient seen and examined by me earlier today.   - In summary,  JEF HOUSE is a 56y year old woman admitted with back / flank pain  - Patient today overall doing ok, comfortable, eating fairly  .. overall otherwise doing better .. was able to sleep      pt was being set up fo ID home, walked with PT and did well with stairs.. but pt feels not ready to go home, states having chest pain post morphine     ==================>> REVIEW OF SYSTEM <<=================    GEN: no fever, no chills, on and off pain as above   RESP: no SOB, no cough, no sputum, no more hemoptysis reported   CVS: no chest pain, no palpitations, no edema  GI: no abdominal pain, no nausea, no constipation, no diarrhea  : no dysuria, no frequency, no hematuria in tube or voiding      + occasional chronic vaginal bleeding   Neuro: no headache, no dizziness  Derm : no itching, no rash    ==================>> PHYSICAL EXAM <<=================    GEN: A&O X 3 , NAD , comfortable, pleasant, calm , cachectic   HEENT: NCAT, PERRL, MMM, hearing intact  Neck: supple , no JVD appreciated  CVS: S1S2 , regular , No M/R/G appreciated  PULM: CTA B/L,  no W/R/R appreciated  ABD.: soft. non tender, non distended,  bowel sounds present  Extrem: intact pulses , no edema     nephrostomy in place and draining to bag   PSYCH : normal mood,  not anxious                             ( Note written / Date of service 10-21-22 )    ==================>> MEDICATIONS <<====================    ascorbic acid 500 milliGRAM(s) Oral daily  chlorhexidine 2% Cloths 1 Application(s) Topical <User Schedule>  cholecalciferol 2000 Unit(s) Oral daily  enoxaparin Injectable 40 milliGRAM(s) SubCutaneous every 24 hours  ferrous    sulfate 325 milliGRAM(s) Oral daily  folic acid 1 milliGRAM(s) Oral daily  influenza   Vaccine 0.5 milliLiter(s) IntraMuscular once  lidocaine   4% Patch 1 Patch Transdermal every 24 hours  pantoprazole    Tablet 40 milliGRAM(s) Oral before breakfast  senna 2 Tablet(s) Oral at bedtime    MEDICATIONS  (PRN):  acetaminophen     Tablet .. 650 milliGRAM(s) Oral every 6 hours PRN Mild Pain (1 - 3)  aluminum hydroxide/magnesium hydroxide/simethicone Suspension 30 milliLiter(s) Oral every 4 hours PRN Dyspepsia  bisacodyl 5 milliGRAM(s) Oral daily PRN Constipation  melatonin 3 milliGRAM(s) Oral at bedtime PRN Insomnia  morphine   Solution 5 milliGRAM(s) Oral every 4 hours PRN Moderate and Severe pain  ondansetron Injectable 4 milliGRAM(s) IV Push every 8 hours PRN Nausea and/or Vomiting    ___________  Active diet:  Diet, Regular:   High Fiber (HIFIBER)  Supplement Feeding Modality:  Oral  Ensure Plus High Protein Cans or Servings Per Day:  2       Frequency:  Daily  ___________________    ==================>> VITAL SIGNS <<==================    Height (cm): 162.6  Weight (kg): 38  BMI (kg/m2): 14.4  Vital Signs Last 24 HrsT(C): 36.9 (10-21-22 @ 12:50)  T(F): 98.5 (10-21-22 @ 12:50), Max: 98.6 (10-21-22 @ 05:15)  HR: 70 (10-21-22 @ 12:50) (70 - 76)  BP: 112/61 (10-21-22 @ 12:50)  RR: 18 (10-21-22 @ 12:50) (16 - 18)  SpO2: 98% (10-21-22 @ 12:50) (98% - 100%)       ==================>> LAB AND IMAGING <<==================                        8.7    7.96  )-----------( 366      ( 21 Oct 2022 07:10 )             30.1        10-21    137  |  99  |  21  ----------------------------<  80  4.2   |  26  |  0.97    Ca    10.8<H>      21 Oct 2022 07:10  Phos  3.6     10-21  Mg     2.10     10-21      WBC count:   7.96 <<== ,  10.08 <<== ,  7.94 <<== ,  9.25 <<== ,  9.75 <<==   Hemoglobin:   8.7 <<==,  8.8 <<==,  8.0 <<==,  8.6 <<==,  8.9 <<==  platelets:  366 <==, 399 <==, 345 <==, 398 <==, 357 <==    Creatinine:  0.97  <<==, 0.94  <<==, 0.88  <<==, 0.84  <<==, 0.83  <<==  Sodium:   137  <==, 136  <==, 137  <==, 137  <==, 133  <==       AST:          13 <== , 18 <==      ALT:        8  <== , 10  <==      AP:        96  <=, 105  <=     Bili:        <0.2  <=, <0.2  <=    ____________________________    M I C R O B I O L O G Y :    Culture - Urine (collected 18 Oct 2022 20:50)  Source: Clean Catch Clean Catch (Midstream)  Preliminary Report (20 Oct 2022 15:15):    10,000 - 49,000 CFU/mL Enterococcus species    ___________________________________________________________________________________  ===============>>  A S S E S S M E N T   A N D   P L A N <<===============  ------------------------------------------------------------------------------------------    · Assessment	  56 year old female with PMHx of Asthma, Anemia, Bladder cancer with 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     Problem/Plan - 1:  ·  Problem: low back pain.   ·  Plan: acute on chronic, involving both hips, with left worse than right. Affecting mobility. Not controlled by Tramadol and Tylenol.   CT scan of abdomen and pelvis: no evidence of osseous metastatic disease.  -pt taking and seems to be doing ok with PO Morphine       chest pain after taking morphine is not typical.. pain is not cardiac: likely from gas as pt state has not had a BM for several days >> bowel regimen given ... ) :  monitor   palliative follow up and mgmt appreciated     Problem/Plan - 2:  ·  Problem: Hemoptysis. resolved   CT demonstrates: Numerous bilateral pulmonary metastases; mild interval increase in size of a few of the largest pulmonary metastases compared to 9/20/2022.  - Monitor for now  - pt planned for OP folowup with oncology for treatment / chemo vs immunotherapy     Problem/Plan - 3:  ·  Problem: Hypoglycemia.   ·  Plan: glucose to 59 via FS; non-diabetic, s/p D-50 in the ED  diet prescribed, along with supplements  - Monitor fingersticks.    Problem/Plan - 4:  ·  Problem: Chest pain.   ·  Plan: C/o chest pain earlier in the morning   ECG similar to previous and no acute ischemic changes   trop negative  pain not cardiac   - no need for tele   - repeat ECG if pt has chest pain.    Problem/Plan - 5:  ·  Problem: Primary cancer of bladder with metastasis to other site.   ·  Plan: with metastases and has left hydronephrosis  s/p right nephrostomy tube in place draining w/o difficulty  - Oncology consult / follow up ( pt states she will be changing oncologists from [facility], Dr Jones, to [facility])     Problem/Plan - 6:  ·  Problem: Slow transit constipation.   ·  Plan: no bowel movement for the past 3 days while on opiate  - Bowel regimen   - Monitor BM.    Problem/Plan - 7:  ·  Problem: Protein calorie malnutrition.   ·  Plan: Poor PO intake, confused/frustrated as to what to eat at present. no longer taking MVI; will restart  - Nutrition consult     Problem/Plan - 8:  ·  Problem: Prophylactic measure.   ·  Plan: DVT ppx: will do SCD for now, will switch to Lovenox  no PT needs.    discharge planing    --------------------------------------------  Case discussed with pt, NP  Education given on findings and plan of care  ___________________________  H. NIEVES Ochoa.  Pager: [phone]

## 2022-10-21 NOTE — DISCHARGE NOTE PROVIDER - HOSPITAL COURSE
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     Low back pain.   acute on chronic, involving both hips, with left worse than right. Affecting mobility. Not controlled by Tramadol and Tylenol.   CT scan of abdomen and pelvis: no evidence of osseous metastatic disease.  -pt taking and seems to be doing ok with PO Morphine     Hemoptysis.   patient endorsed same earlier, in the setting of cancer metastatic to the lungs  no episodes since admission   CT demonstrates: Numerous bilateral pulmonary metastases; mild interval increase in size of a few of the largest pulmonary metastases compared to 9/20/2022.  - Monitor for now  - pt planned for OP folowup with oncology for treatment / chemo vs immunotherapy     Hypoglycemia.   glucose to 59 via FS; non-diabetic, s/p D-50 in the ED  diet prescribed, along with supplements  - Monitor fingersticks.    Chest pain.    C/o chest pain earlier in the morning   ECG similar to previous and no acute ischemic changes   trop negative  pain not cardiac   - no need for tele   - repeat ECG if pt has chest pain.    Primary cancer of bladder with metastasis to other site.    with metastases and has left hydronephrosis  s/p right nephrostomy tube in place draining w/o difficulty  - Oncology consult / follow up ( pt states she will be changing oncologists from [facility], Dr Jones, to [facility])     Slow transit constipation.   no bowel movement for the past 3 days while on opiate  - Bowel regimen   - Monitor BM.    Protein calorie malnutrition.   Poor PO intake, confused/frustrated as to what to eat at present. no longer taking MVI; will restart  - Nutrition consult     Patient is medically stable for discharge on ---------------- per attending Dr. Ochoa.       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     Hospital Course    Low back pain.   Acute on chronic, involving both hips, with left worse than right. Affecting mobility. Not controlled by Tramadol and Tylenol.   CT scan of abdomen and pelvis: no evidence of osseous metastatic disease. Seen and evaluated by Palliative team for pain management. Patient very hesitant about taking opioids however has become more willing to take, pain now better controlled on this regimen, feeling improved   - c/w morphine solution 5 mg q4h ATC   - c/w morphine solution 7.5mg Po q3hrs PRN for breakthrough pain  - c/w flexeril 5mg TID PRN for muscle spasm   - bowel regimen.    Hemoptysis.   patient endorsed same earlier, in the setting of cancer metastatic to the lungs, no episodes since admission. CT demonstrates: Numerous bilateral pulmonary metastases; mild interval increase in size of a few of the largest pulmonary metastases compared to 9/20/2022. Pt planned for OP folowup with oncology for treatment / chemo vs immunotherapy     Hypoglycemia.   glucose to 59 via FS; non-diabetic, s/p D-50 in the ED  diet prescribed, along with supplements. No stable.    Chest pain.   ECG similar to previous and no acute ischemic changes, trop negative. Pain not cardiac. Source of pain unclear however, no bone mets or lesions in area. Pain migrates to different locations, also possible musculoskeletal component. Pian regimen as above.     Primary cancer of bladder with metastasis to other site.   Pt. with metastases and has left hydronephrosis s/p right nephrostomy tube in place draining w/o difficulty. IR consulted for exchage. Pt refused to go to IR for R NT. Pt stated that NT is patent and wants to go back to [place] where she normally goes for her exchange. Oncology out patient follow up (pt states she will be changing oncologists from [facility], Dr Jones, to [facility]). Pt declined TVUS and GYN exam before (probably wise as likely will cause more bleeding) GYN appreciated, there is a mass in the vaginal canal, likely cause of bleeding. No additional benefit from a  consult at this time.     Chronic anemia / anemia of chronic disease   [place] and stable today s/p PRBC 11/4. Pt. post IV Iron.       Slow transit constipation.   Patient with BM yesterday, c/w bowel regimen.    + COVID  Pt asymptomatic, Likely residual from prior / recent infection. Would not treat or isolate.     Protein calorie malnutrition.   Poor PO intake, confused/frustrated as to what to eat at present. no longer taking MVI; will restart. Nutrition consulted and educated patient.       On ___ this case was reviewed with  ____, the patient is medically stable and optimized for discharge. All medications were reviewed and prescriptions were sent to mutually agreed upon pharmacy.        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     Hospital Course    Low back pain.   Acute on chronic, involving both hips, with left worse than right. Affecting mobility. Not controlled by Tramadol and Tylenol.   CT scan of abdomen and pelvis: no evidence of osseous metastatic disease. Seen and evaluated by Palliative team for pain management. Patient very hesitant about taking opioids however has become more willing to take, pain now better controlled on this regimen, feeling improved   - c/w morphine solution 5 mg q4h ATC   - c/w morphine solution 7.5mg Po q3hrs PRN for breakthrough pain  - c/w flexeril 5mg TID PRN for muscle spasm   - bowel regimen.    Hemoptysis.   patient endorsed same earlier, in the setting of cancer metastatic to the lungs, no episodes since admission. CT demonstrates: Numerous bilateral pulmonary metastases; mild interval increase in size of a few of the largest pulmonary metastases compared to 9/20/2022. Pt planned for OP folowup with oncology for treatment / chemo vs immunotherapy     Hypoglycemia.   glucose to 59 via FS; non-diabetic, s/p D-50 in the ED  diet prescribed, along with supplements. No stable.    Chest pain.   ECG similar to previous and no acute ischemic changes, trop negative. Pain not cardiac. Source of pain unclear however, no bone mets or lesions in area. Pain migrates to different locations, also possible musculoskeletal component. Pian regimen as above.     Primary cancer of bladder with metastasis to other site.   Pt. with metastases and has left hydronephrosis s/p right nephrostomy tube in place draining w/o difficulty. IR consulted for exchage. Pt refused to go to IR for R NT. Pt stated that NT is patent and wants to go back to [place] where she normally goes for her exchange. Oncology out patient follow up (pt states she will be changing oncologists from [facility], Dr Jones, to [facility]). Pt declined TVUS and GYN exam before (probably wise as likely will cause more bleeding) GYN appreciated, there is a mass in the vaginal canal, likely cause of bleeding. No additional benefit from a  consult at this time.     Chronic anemia / anemia of chronic disease   [place] and stable today s/p PRBC 11/4. Pt. post IV Iron.       Slow transit constipation.   Patient with BM yesterday, c/w bowel regimen.    + COVID  Pt asymptomatic, Likely residual from prior / recent infection. Would not treat or isolate.     Protein calorie malnutrition.   Poor PO intake, confused/frustrated as to what to eat at present. no longer taking MVI; will restart. Nutrition consulted and educated patient.       On 11/8/2022 this case was reviewed with Dr. Ochoa, the patient is medically stable and optimized for discharge. All medications were reviewed and prescriptions were sent to mutually agreed upon pharmacy.        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     Hospital Course    Low back pain.   Acute on chronic, involving both hips, with left worse than right. Affecting mobility. Not controlled by Tramadol and Tylenol.   CT scan of abdomen and pelvis: no evidence of osseous metastatic disease. Seen and evaluated by Palliative team for pain management. Patient very hesitant about taking opioids however has become more willing to take, pain now better controlled on this regimen, feeling improved   - c/w morphine solution 5 mg q4h ATC   - c/w morphine solution 7.5mg Po q3hrs PRN for breakthrough pain  - c/w flexeril 5mg TID PRN for muscle spasm   - bowel regimen.    Hemoptysis.   patient endorsed same earlier, in the setting of cancer metastatic to the lungs, no episodes since admission. CT demonstrates: Numerous bilateral pulmonary metastases; mild interval increase in size of a few of the largest pulmonary metastases compared to 9/20/2022. Pt planned for OP folowup with oncology for treatment / chemo vs immunotherapy     Hypoglycemia.   glucose to 59 via FS; non-diabetic, s/p D-50 in the ED  diet prescribed, along with supplements. No stable.    Chest pain.   ECG similar to previous and no acute ischemic changes, trop negative. Pain not cardiac. Source of pain unclear however, no bone mets or lesions in area. Pain migrates to different locations, also possible musculoskeletal component. Pian regimen as above.     Primary cancer of bladder with metastasis to other site.   Pt. with metastases and has left hydronephrosis s/p right nephrostomy tube in place draining w/o difficulty. IR consulted for exchage. Pt refused to go to IR for R NT. Pt stated that NT is patent and wants to go back to [place] where she normally goes for her exchange. Oncology out patient follow up (pt states she will be changing oncologists from [facility], Dr Jones, to [facility]). Pt declined TVUS and GYN exam before (probably wise as likely will cause more bleeding) GYN appreciated, there is a mass in the vaginal canal, likely cause of bleeding. No additional benefit from a  consult at this time.     Chronic anemia / anemia of chronic disease   [place] and stable today s/p PRBC 11/4. Pt. post IV Iron.       Slow transit constipation.   Patient with BM yesterday, c/w bowel regimen.    + COVID  Pt asymptomatic, Likely residual from prior / recent infection. Would not treat or isolate.     Protein calorie malnutrition.   Poor PO intake, confused/frustrated as to what to eat at present. no longer taking MVI; will restart. Nutrition consulted and educated patient.     uti   Bactrim q 12 PO 5 days     On 11/14/2022 this case was reviewed with Dr. Ochoa, the patient is medically stable and optimized for discharge. All medications were reviewed and prescriptions were sent to mutually agreed upon pharmacy.        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     Hospital Course    Low back pain.   Acute on chronic, involving both hips, with left worse than right. Affecting mobility. Not controlled by Tramadol and Tylenol.   CT scan of abdomen and pelvis: no evidence of osseous metastatic disease. Seen and evaluated by Palliative team for pain management. Patient very hesitant about taking opioids however has become more willing to take, pain now better controlled on this regimen, feeling improved   - c/w morphine solution 5 mg q4h ATC   - c/w morphine solution 7.5mg Po q3hrs PRN for breakthrough pain  - c/w flexeril 5mg TID PRN for muscle spasm   - bowel regimen.    Hemoptysis.   patient endorsed same earlier, in the setting of cancer metastatic to the lungs, no episodes since admission. CT demonstrates: Numerous bilateral pulmonary metastases; mild interval increase in size of a few of the largest pulmonary metastases compared to 9/20/2022. Pt planned for OP folowup with oncology for treatment / chemo vs immunotherapy     Hypoglycemia.   glucose to 59 via FS; non-diabetic, s/p D-50 in the ED  diet prescribed, along with supplements. No stable.    Chest pain.   ECG similar to previous and no acute ischemic changes, trop negative. Pain not cardiac. Source of pain unclear however, no bone mets or lesions in area. Pain migrates to different locations, also possible musculoskeletal component. Pian regimen as above.     Primary cancer of bladder with metastasis to other site.   Pt. with metastases and has left hydronephrosis s/p right nephrostomy tube in place draining w/o difficulty. IR consulted for exchage. Pt refused to go to IR for R NT. Pt stated that NT is patent and wants to go back to [place] where she normally goes for her exchange. Oncology out patient follow up (pt states she will be changing oncologists from [facility], Dr Jones, to [facility]). Pt declined TVUS and GYN exam before (probably wise as likely will cause more bleeding) GYN appreciated, there is a mass in the vaginal canal, likely cause of bleeding. No additional benefit from a  consult at this time.     Chronic anemia / anemia of chronic disease   [place] and stable today s/p PRBC 11/4. Pt. post IV Iron.       Slow transit constipation.   Patient with BM yesterday, c/w bowel regimen.    + COVID  Pt asymptomatic, Likely residual from prior / recent infection. Would not treat or isolate.     Protein calorie malnutrition.   Poor PO intake, confused/frustrated as to what to eat at present. no longer taking MVI; will restart. Nutrition consulted and educated patient.     uti   Bactrim q 12 PO 5 days     On 11/15/2022 this case was reviewed with Dr. Ochoa, the patient is medically stable and optimized for discharge. All medications were reviewed and prescriptions were sent to mutually agreed upon pharmacy.

## 2022-10-21 NOTE — DISCHARGE NOTE PROVIDER - CARE PROVIDERS DIRECT ADDRESSES
,DirectAddress_Unknown,[email or web address] ,[email or web address],[email or web address],DirectAddress_Unknown

## 2022-10-21 NOTE — DISCHARGE NOTE NURSING/CASE MANAGEMENT/SOCIAL WORK - NSDCPEFALRISK_GEN_ALL_CORE
For information on Fall & Injury Prevention, visit: [email or web address] OR  [email or web address] OR  https://www.cdc.gov/steadi/patient.html

## 2022-10-21 NOTE — PROGRESS NOTE ADULT - SUBJECTIVE AND OBJECTIVE BOX
SUBJECTIVE AND OBJECTIVE:  Patient seen and examined at bedside. Patient being followed up for pain.   Patient states she has been alternating between tylenol and morphine and that has been helping with the pain.   She states that she had some pain when working with PT yesterday   Has constipation ; last bowel movement was 3 days ago    INTERVAL HPI/OVERNIGHT EVENTS:  In past 24 hours, received 2 prn doses of PO Morphine Solution     Allergies    No Known Allergies    Intolerances    MEDICATIONS  (STANDING):  ascorbic acid 500 milliGRAM(s) Oral daily  chlorhexidine 2% Cloths 1 Application(s) Topical <User Schedule>  cholecalciferol 2000 Unit(s) Oral daily  enoxaparin Injectable 40 milliGRAM(s) SubCutaneous every 24 hours  ferrous    sulfate 325 milliGRAM(s) Oral daily  folic acid 1 milliGRAM(s) Oral daily  influenza   Vaccine 0.5 milliLiter(s) IntraMuscular once  lidocaine   4% Patch 1 Patch Transdermal every 24 hours  pantoprazole    Tablet 40 milliGRAM(s) Oral before breakfast  senna 2 Tablet(s) Oral at bedtime    MEDICATIONS  (PRN):  acetaminophen     Tablet .. 650 milliGRAM(s) Oral every 6 hours PRN Mild Pain (1 - 3)  aluminum hydroxide/magnesium hydroxide/simethicone Suspension 30 milliLiter(s) Oral every 4 hours PRN Dyspepsia  bisacodyl 5 milliGRAM(s) Oral daily PRN Constipation  melatonin 3 milliGRAM(s) Oral at bedtime PRN Insomnia  morphine   Solution 5 milliGRAM(s) Oral every 4 hours PRN Moderate and Severe pain  ondansetron Injectable 4 milliGRAM(s) IV Push every 8 hours PRN Nausea and/or Vomiting      ITEMS UNCHECKED ARE NOT PRESENT    PRESENT SYMPTOMS: [ ]Unable to self-report due to altered mental status- see [ ] CPOT [ ] PAINADS [ ] RDOS  Source if other than patient:  [ ]Family   [ ]Team     Pain: [x ]yes [ ]no  QOL impact - moderate  Location -    left back                Aggravating factors  - movement   Quality - sharp  Radiation - right back and left later side   Timing- intermittent   Severity (0-10 scale): 8  Minimal acceptable level (0-10 scale): 2    Dyspnea:                           [ ]Mild [ ]Moderate [ ]Severe  Anxiety:                             [x ]Mild [ ]Moderate [ ]Severe  Agitation:                          [ ]Mild [ ]Moderate [ ]Severe  Fatigue:                             [ ]Mild [ ]Moderate [ ]Severe  Nausea:                             [ ]Mild [ ]Moderate [ ]Severe  Loss of appetite:              [ ]Mild [ ]Moderate [ ]Severe  Constipation:                   [ ]Mild [ ]Moderate [ ]Severe  Diarrhea:                          [ ]Mild [ ]Moderate [ ]Severe      CPOT:    https://www.[facility].org/getattachment/[email or web address]/Critical-Care-Pain-Observation-Tool-(CPOT)    PCSSQ[Palliative Care Spiritual Screening Question]   Severity (0-10): 5  Score of 4 or > indicate consideration of Chaplaincy referral.  Chaplaincy Referral: [x ] yes [ ] refused [ ] following [ ] deferred    Caregiver [place]? : [ ] yes [ ] no [ ] Declined [x ] Deferred              Social work referral [ ] Patient & Family Centered Care Referral [ ]     Anticipatory Grief present?:  [ ] yes [ ] no  [ x] Deferred                  Social work referral [ ] Chaplaincy Referral[ ]    Other Symptoms:  [x ]All other review of systems negative     PHYSICAL EXAM:  Vital Signs Last 24 Hrs  T(C): 37 (21 Oct 2022 05:15), Max: 37 (21 Oct 2022 05:15)  T(F): 98.6 (21 Oct 2022 05:15), Max: 98.6 (21 Oct 2022 05:15)  HR: 71 (21 Oct 2022 05:15) (71 - 77)  BP: 98/50 (21 Oct 2022 05:15) (98/50 - 123/66)  BP(mean): --  RR: 18 (21 Oct 2022 05:15) (16 - 18)  SpO2: 99% (21 Oct 2022 05:15) (99% - 100%)    Parameters below as of 21 Oct 2022 05:15  Patient On (Oxygen Delivery Method): room air         I&O's Summary     GENERAL:  [ x]Alert  [x ]Oriented x   [ ]Lethargic  [ ]Cachexia  [ ]Unarousable  [ x]Verbal  [ ]Non-Verbal    Behavioral:   [x ] Anxiety  [ ] Delirium [ ] Agitation [ ] Calm     HEENT:  [ x]Normal  [ ] Temporal Wasting  [ ]Dry mouth   [ ]ET Tube/Trach  [ ]Oral lesions  [ ] Mucositis  PULMONARY:   [x ]Clear [ ]Tachypnea  [ ]Audible excessive secretions   [ ]Rhonchi        [ ]Right [ ]Left [ ]Bilateral  [ ]Crackles        [ ]Right [ ]Left [ ]Bilateral  [ ]Wheezing     [ ]Right [ ]Left [ ]Bilateral  [ ]Diminished breath sounds [ ]right [ ]left [ ]bilateral  CARDIOVASCULAR:    [x ]Regular [ ]Irregular [ ]Tachy  [ ]Bradley [ ]Murmur [ ]Other  GASTROINTESTINAL:  [x ]Soft  [ ]Distended   [ ]+BS  [ x]Non tender [ ]Tender  [ ]PEG [ ]OGT/ NGT  Last BM: 10/19   GENITOURINARY: Nephrostomy tube   [ ]Normal [ ] Incontinent   [ ]Oliguria/Anuria   [ ]Lloyd  MUSCULOSKELETAL:   [ x]Normal   [ ]Weakness  [ ]Bed/Wheelchair bound [ ]Edema  [  ] amputation  [  ] contraction  NEUROLOGIC:   [ x]No focal deficits  [ ]Cognitive impairment  [ ]Dysphagia [ ]Dysarthria [ ]Paresis [ ]Other   SKIN: See Nursing Skin Assessment for further details  [x ]Normal    [ ]Rash  [ ]Pressure ulcer(s)       Present on admission [ ]y [ ]n   [  ]  Wound    [  ] hyperpigmentation    CRITICAL CARE:  [ ]Shock Present  [ ]Septic [ ]Cardiogenic [ ]Neurologic [ ]Hypovolemic  [ ]Vasopressors [ ]Inotropes  [ ]Respiratory failure present [ ]Mechanical Ventilation [ ]Non-invasive ventilatory support [ ]High-Flow   [ ]Acute  [ ]Chronic [ ]Hypoxic  [ ]Hypercarbic [ ]Other  [ ]Other organ failure     LABS:                                   8.7    7.96  )-----------( 366      ( 21 Oct 2022 07:10 )             30.1       10-21    137  |  99  |  21  ----------------------------<  80  4.2   |  26  |  0.97    Ca    10.8<H>      21 Oct 2022 07:10  Phos  3.6     10-21  Mg     2.10     10-21        RADIOLOGY & ADDITIONAL STUDIES: reviewed     Protein Calorie Malnutrition Present: [ ]mild [ ]moderate [x ]severe [ ]underweight [ ]morbid obesity  https://www.andeal.org/vault/8970/web/files/ONC/Table_Clinical%20Characteristics%20to%20Document%20Malnutrition-White%20JV%20et%20al%202012.pdf    Height (cm): 162.6 (10-18-22 @ 18:03), 162.6 (09-19-22 @ 14:23), 162.6 (08-15-22 @ 15:45)  Weight (kg): 38 (10-18-22 @ 18:03), 38.3 (09-19-22 @ 14:23), 38.6 (08-15-22 @ 15:45)  BMI (kg/m2): 14.4 (10-18-22 @ 18:03), 14.5 (09-19-22 @ 14:23), 14.6 (08-15-22 @ 15:45)    [ ]PPSV2 < or = 30%  [ ]significant weight loss [ ]poor nutritional intake [ ]anasarca   [ ]Artificial Nutrition    REFERRALS:   [ ]Chaplaincy  [ ]Hospice  [ ]Child Life  [ ]Social Work  [x ]Case management [ ]Holistic Therapy

## 2022-10-21 NOTE — DISCHARGE NOTE PROVIDER - PROVIDER TOKENS
PROVIDER:[TOKEN:[[id number]]],PROVIDER:[TOKEN:[[id number]]],PROVIDER:[TOKEN:[[id number]]] PROVIDER:[TOKEN:[[id number]]],PROVIDER:[TOKEN:[[id number]]],PROVIDER:[TOKEN:[[id number]]],PROVIDER:[TOKEN:[[id number]]]

## 2022-10-21 NOTE — DISCHARGE NOTE PROVIDER - CARE PROVIDER_API CALL
Priscila Rosario  CARDIOVASCULAR DISEASE  [address], [address]  [place] [address]  Phone: [phone]  Fax: [phone]  Follow Up Time:     Mavis Kan; PhD)  Hematology; Internal Medicine; Medical Oncology  [address] [place] [address]  Phone: [phone]  Fax: [phone]  Follow Up Time:     Renay Trinidad)  Urology  [address] [place] [address]  Phone: [phone]  Fax: [phone]  Follow Up Time:    Priscila Rosario  CARDIOVASCULAR DISEASE  [address] [facility], [address]  [place] [address]  Phone: [phone]  Fax: [phone]  Follow Up Time:     Mavis Kan; PhD)  Hematology; Internal Medicine; Medical Oncology  [address] [place] [address]  Phone: [phone]  Fax: [phone]  Follow Up Time:     Renay Trinidad)  Urology  [address] [place] [address]  Phone: [phone]  Fax: [phone]  Follow Up Time:     MIKKI SIDHU  Internal Medicine  [address] [place] [address]  Phone: [phone]  Fax: [phone]  Follow Up Time:

## 2022-10-21 NOTE — DISCHARGE NOTE PROVIDER - NSDCCAREPROVSEEN_GEN_ALL_CORE_FT
Candelario Ochoa Candelario Ericorbzehra Garces  Not Available Doctor  User ADM  Ordering Physician  Priscila Simmons  Team [facility] ACP  Team [facility] Interventional Radiology  Team [facility] Nutrition Support Team      [ Greater than 35 min spent for discharge services.   LUIS ANGEL Ochoa ]

## 2022-10-21 NOTE — DISCHARGE NOTE PROVIDER - NSDCCPCAREPLAN_GEN_ALL_CORE_FT
PRINCIPAL DISCHARGE DIAGNOSIS  Diagnosis: Back pain, acute  Assessment and Plan of Treatment: You had a CT of [facility] abdomen that showed no evidence of osseous metastatic disease (bone cancer). You have been seen and evaluated by the Palliative team for pain management. Please continue to take your morphine and flexeril as prescribed for pain and follow up with your outpatient provider for further medical management. It is very important that you continue your bowel regimen to prevent constipation while taking morphine. Please continue your regmen as ordered.      SECONDARY DISCHARGE DIAGNOSES  Diagnosis: Hemoptysis  Assessment and Plan of Treatment: You had a CT that demonstrates: Numerous bilateral pulmonary metastases; mild interval increase in size of a few of the largest pulmonary metastases compared to 9/20/2022. Please follow up with your outpatient oncologlist for for further treatments and medical management.    Diagnosis: Hypoglycemia  Assessment and Plan of Treatment: You have been treated for low blood sugar while in the hospital due to your weak appetite. You have been seen and educated by a nutritionist who has given you information about what to eat. Monitor for signs/symptoms of low blood glucose, such as, dizziness, altered mental status, or cool/clammy skin. Make sure to stay hydrated and follow up with your oputpatient provider for further management.    Diagnosis: Chest pain  Assessment and Plan of Treatment: You have not demonstrated any changes on your EKG and your blood work has been negative for any cardiac related events. It has been determined that your pain is not related to your heart and may be musculoskeletal. There are no bone mets or lesions in area. Please continue to follow ypur painian regimen as above.    Diagnosis: Primary cancer of bladder with metastasis to other site  Assessment and Plan of Treatment: Primary cancer of bladder with metastasis to other site.   Pt. with metastases and has left hydronephrosis s/p right nephrostomy tube in place draining w/o difficulty. IR consulted for exchage. Pt refused to go to IR for R NT. Pt stated that NT is patent and wants to go back to [place] where she normally goes for her exchange. Oncology out patient follow up (pt states she will be changing oncologists from [facility], Dr Jones, to [facility] / mian). Pt declined TVUS and GYN exam before (probably wise as likely will cause more bleeding) GYN appreciated, there is a mass in the vaginal canal, likely cause of bleeding. No additional benefit from a  consult at this time.    Diagnosis: Anemia  Assessment and Plan of Treatment: You had a blood transfusion on 11/4. Your blood counts have been stable and you recieved IV iron. Follow-up with your outpatient provider for further monitoring of your blood counts. Monitor for signs/symptoms indicating worsening of disease, such as, easy bleeding/bruising, pale skin, fatigue, dizziness, increased heart rate, or chest pain.    Diagnosis: Slow transit constipation  Assessment and Plan of Treatment: Slow transit constipation.   Patient with BM yesterday, c/w bowel regimen.    Diagnosis: 2019 novel coronavirus disease (COVID-19)  Assessment and Plan of Treatment: + COVID  Pt asymptomatic, Likely residual from prior / recent infection. Would not treat or isolate.    Diagnosis: Protein calorie malnutrition  Assessment and Plan of Treatment: Protein calorie malnutrition.   Poor PO intake, confused/frustrated as to what to eat at present. no longer taking MVI; will restart. Nutrition consulted and educated patient.     PRINCIPAL DISCHARGE DIAGNOSIS  Diagnosis: Back pain, acute  Assessment and Plan of Treatment: You had a CT of [facility] abdomen that showed no evidence of osseous metastatic disease (bone cancer). You have been seen and evaluated by the Palliative team for pain management. Please continue to take your morphine and flexeril as prescribed for pain and follow up with your outpatient provider for further medical management. It is very important that you continue your bowel regimen to prevent constipation while taking morphine. Please continue your regmen as ordered.      SECONDARY DISCHARGE DIAGNOSES  Diagnosis: Hemoptysis  Assessment and Plan of Treatment: You had a CT that demonstrates: Numerous bilateral pulmonary metastases; mild interval increase in size of a few of the largest pulmonary metastases compared to 9/20/2022. Please follow up with your outpatient oncologlist for for further treatments and medical management.    Diagnosis: Hypoglycemia  Assessment and Plan of Treatment: You have been treated for low blood sugar while in the hospital due to your weak appetite. You have been seen and educated by a nutritionist who has given you information about what to eat. Monitor for signs/symptoms of low blood glucose, such as, dizziness, altered mental status, or cool/clammy skin. Make sure to stay hydrated and follow up with your oputpatient provider for further management.    Diagnosis: Chest pain  Assessment and Plan of Treatment: You have not demonstrated any changes on your EKG and your blood work has been negative for any cardiac related events. It has been determined that your pain is not related to your heart and may be musculoskeletal. There are no bone mets or lesions in area. Please continue to follow ypur painian regimen as above.    Diagnosis: Primary cancer of bladder with metastasis to other site  Assessment and Plan of Treatment: You have a right nephrostomy tube in place draining w/o difficulty. You have refused your routine tube exchange here and have expressed that you would like to return to [facility] where you normally go for your exchange. Please follow up with Dr. Kan at [facility]. You have declined a transvaginal ultrasound and GYN exam at this time. You have a mass in the vaginal canal which is likely the cause of your bleeding. Please follow up with your outpatient urologist for further medical management.    Diagnosis: Anemia  Assessment and Plan of Treatment: You had a blood transfusion on 11/4. Your blood counts have been stable and you recieved IV iron. Follow-up with your outpatient provider for further monitoring of your blood counts. Monitor for signs/symptoms indicating worsening of disease, such as, easy bleeding/bruising, pale skin, fatigue, dizziness, increased heart rate, or chest pain.    Diagnosis: Slow transit constipation  Assessment and Plan of Treatment: It is important that you continue with your bowel regimen while you are on morphine medication Please continue with your regimen as prescribed.    Diagnosis: 2019 novel coronavirus disease (COVID-19)  Assessment and Plan of Treatment: You have incidentally tested positive for COVID 19 while in the hospital. You are asymptomatic and the positive result is likely residual from prior or recent infection. There is no need to treat with medication or isolate at this time.    Diagnosis: Protein calorie malnutrition  Assessment and Plan of Treatment: You have been seen and evaluated by a Nutritionist who has educated you about what types of food to eat. Please continue to follow the recommendations given to you.

## 2022-10-21 NOTE — DISCHARGE NOTE PROVIDER - NSDCFUADDAPPT_GEN_ALL_CORE_FT
Please follow up as an outpatient with Dr. Kan at [facility].     Follow up with Dr. Trinidad, Urologist for outpatient management of bladder cancer. [phone]

## 2022-10-22 NOTE — PROGRESS NOTE ADULT - ASSESSMENT
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     Problem/Plan - 1:  ·  Problem: low back pain.   ·  Plan: acute on chronic, involving both hips, with left worse than right. Affecting mobility. Not controlled by Tramadol and Tylenol.   CT scan of abdomen and pelvis: no evidence of osseous metastatic disease.  -pt taking and seems to be doing ok with PO Morphine   palliative follow up and mgmt appreciated     Problem/Plan - 2:  ·  Problem: Hemoptysis. resolved   CT demonstrates: Numerous bilateral pulmonary metastases; mild interval increase in size of a few of the largest pulmonary metastases compared to 9/20/2022.  - Monitor for now  - pt planned for OP folowup with oncology for treatment / chemo vs immunotherapy     Problem/Plan - 3:  ·  Problem: Hypoglycemia.   ·  Plan: resolved   Problem/Plan - 4:  ·  Problem: Chest pain.   ·  Plan: resolved     Problem/Plan - 5:  ·  Problem: Primary cancer of bladder with metastasis to other site.   ·  Plan: with metastases and has left hydronephrosis  s/p right nephrostomy tube in place draining w/o difficulty  - Oncology consult / follow up ( pt states she will be changing oncologists from [facility], Dr Jones, to [facility])     Problem/Plan - 6:  ·  Problem: Slow transit constipation.   ·  Plan: no bowel movement for the past 3 days while on opiate  - Bowel regimen   - Monitor BM.    Problem/Plan - 7:  ·  Problem: Protein calorie malnutrition.   ·  Plan: Poor PO intake, confused/frustrated as to what to eat at present. no longer taking MVI; will restart  - Nutrition consult     Problem/Plan - 8:  ·  Problem: Prophylactic measure.   ·  Plan: DVT ppx: will do SCD for now, will switch to Lovenox  no PT needs.    discharge planing    zoila adan MD

## 2022-10-22 NOTE — PROGRESS NOTE ADULT - SUBJECTIVE AND OBJECTIVE BOX
Patient is a 56y old  Female who presents with a chief complaint of Chronic back pain (21 Oct 2022 15:48)      INTERVAL HPI/OVERNIGHT EVENTS: seen and examined  T(C): 36.4 (10-22-22 @ 15:00), Max: 36.9 (10-21-22 @ 21:11)  HR: 75 (10-22-22 @ 15:00) (69 - 75)  BP: 110/81 (10-22-22 @ 15:00) (98/57 - 114/60)  RR: 18 (10-22-22 @ 15:00) (18 - 18)  SpO2: 99% (10-22-22 @ 15:00) (98% - 99%)  Wt(kg): --  I&O's Summary    21 Oct 2022 07:01  -  22 Oct 2022 07:00  --------------------------------------------------------  IN: 0 mL / OUT: 210 mL / NET: -210 mL        PAST MEDICAL & SURGICAL HISTORY:  Anemia      Asthma      HLD (hyperlipidemia)      Pacemaker  [employer] [facility]      Bladder cancer      HTN (hypertension)      Parathyroid tumor      Liver cyst      Hydronephrosis  has right Nephrostomy tube - dressing intact      Bradycardia      History of renal calculi      Birthmark      H/O myomectomy      Presence of cardiac pacemaker      H/O hydronephrosis  s/p Right Perc nephrostomy tube placement 02/2021, exchange 06/2021          SOCIAL HISTORY  Alcohol:  Tobacco:  Illicit substance use:    FAMILY HISTORY:    REVIEW OF SYSTEMS:  CONSTITUTIONAL: No fever, weight loss, or fatigue  EYES: No eye pain, visual disturbances, or discharge  ENMT:  No difficulty hearing, tinnitus, vertigo; No sinus or throat pain  NECK: No pain or stiffness  RESPIRATORY: No cough, wheezing, chills or hemoptysis; No shortness of breath  CARDIOVASCULAR: No chest pain, palpitations, dizziness, or leg swelling  GASTROINTESTINAL: No abdominal or epigastric pain. No nausea, vomiting, or hematemesis; No diarrhea or constipation. No melena or hematochezia.  GENITOURINARY: No dysuria, frequency, hematuria, or incontinence  NEUROLOGICAL: No headaches, memory loss, loss of strength, numbness, or tremors  SKIN: No itching, burning, rashes, or lesions   LYMPH NODES: No enlarged glands  ENDOCRINE: No heat or cold intolerance; No hair loss  MUSCULOSKELETAL: No joint pain or swelling; No muscle, back, or extremity pain  PSYCHIATRIC: No depression, anxiety, mood swings, or difficulty sleeping  HEME/LYMPH: No easy bruising, or bleeding gums  ALLERY AND IMMUNOLOGIC: No hives or eczema    RADIOLOGY & ADDITIONAL TESTS:    Imaging Personally Reviewed:  [ ] YES  [ ] NO    Consultant(s) Notes Reviewed:  [ ] YES  [ ] NO    PHYSICAL EXAM:  GENERAL: NAD, well-groomed, well-developed  HEAD:  Atraumatic, Normocephalic  EYES: EOMI, PERRLA, conjunctiva and sclera clear  ENMT: No tonsillar erythema, exudates, or enlargement; Moist mucous membranes, Good dentition, No lesions  NECK: Supple, No JVD, Normal thyroid  NERVOUS SYSTEM:  Alert & Oriented X3, Good concentration; Motor Strength 5/5 B/L upper and lower extremities; DTRs 2+ intact and symmetric  CHEST/LUNG: Clear to percussion bilaterally; No rales, rhonchi, wheezing, or rubs  HEART: Regular rate and rhythm; No murmurs, rubs, or gallops  ABDOMEN: Soft, Nontender, Nondistended; Bowel sounds present  EXTREMITIES:  2+ Peripheral Pulses, No clubbing, cyanosis, or edema  LYMPH: No lymphadenopathy noted  SKIN: No rashes or lesions    LABS:                        7.9    8.22  )-----------( 352      ( 22 Oct 2022 07:16 )             27.1     10-22    136  |  100  |  25<H>  ----------------------------<  83  4.2   |  24  |  0.93    Ca    10.0      22 Oct 2022 07:16  Phos  3.8     10-22  Mg     2.20     10-22          CAPILLARY BLOOD GLUCOSE                MEDICATIONS  (STANDING):  ascorbic acid 500 milliGRAM(s) Oral daily  chlorhexidine 2% Cloths 1 Application(s) Topical <User Schedule>  cholecalciferol 2000 Unit(s) Oral daily  enoxaparin Injectable 40 milliGRAM(s) SubCutaneous every 24 hours  ferrous    sulfate 325 milliGRAM(s) Oral daily  folic acid 1 milliGRAM(s) Oral daily  influenza   Vaccine 0.5 milliLiter(s) IntraMuscular once  lidocaine   4% Patch 1 Patch Transdermal every 24 hours  pantoprazole    Tablet 40 milliGRAM(s) Oral before breakfast  senna 2 Tablet(s) Oral at bedtime    MEDICATIONS  (PRN):  acetaminophen     Tablet .. 650 milliGRAM(s) Oral every 6 hours PRN Mild Pain (1 - 3)  aluminum hydroxide/magnesium hydroxide/simethicone Suspension 30 milliLiter(s) Oral every 4 hours PRN Dyspepsia  bisacodyl 5 milliGRAM(s) Oral daily PRN Constipation  melatonin 3 milliGRAM(s) Oral at bedtime PRN Insomnia  morphine   Solution 5 milliGRAM(s) Oral every 4 hours PRN Moderate and Severe pain  ondansetron Injectable 4 milliGRAM(s) IV Push every 8 hours PRN Nausea and/or Vomiting      Care Discussed with Consultants/Other Providers [ ] YES  [ ] NO

## 2022-10-22 NOTE — PROGRESS NOTE ADULT - NUTRITIONAL ASSESSMENT
This patient has been assessed with a concern for Malnutrition and has been determined to have a diagnosis/diagnoses of Severe protein-calorie malnutrition and Underweight (BMI < 19).    This patient is being managed with:   Diet Regular-  High Fiber (HIFIBER)  Supplement Feeding Modality:  Oral  Ensure Plus High Protein Cans or Servings Per Day:  2       Frequency:  Daily  Entered: Oct 19 2022 11:46AM

## 2022-10-23 NOTE — CHART NOTE - NSCHARTNOTEFT_GEN_A_CORE
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   Contacted by RN due to patient complaining of right sided rib/back pain and hemoptysis. Per patient she has had hemoptysis daily since admission. Patient feels hemoptysis is increasing. She saved the blood in a tissue. Writer visualized scant blood less than a size of a dime with no clotting. Denies palpitations, headache, nausea, vomiting. Per patient pain is transient and has been present on/off during admission. Of note patient notes to vaginal blood when wiping for the past couple of months. Was evaluated by GYN at [place] however full exam including ultrasound, pap smear deferred at that time       Vital Signs Last 24 Hrs  T(C): 36.8 (10-22-22 @ 21:18), Max: 36.8 (10-22-22 @ 21:18)  T(F): 98.3 (10-22-22 @ 21:18), Max: 98.3 (10-22-22 @ 21:18)  HR: 66 (10-22-22 @ 21:18) (66 - 75)  BP: 104/56 (10-22-22 @ 21:18) (98/57 - 110/81)  RR: 18 (10-22-22 @ 21:18) (18 - 18)  SpO2: 100% (10-22-22 @ 21:18) (98% - 100%) on (O2)    PHYSICAL EXAM:  Constitutional: sitting in bed; appears to be uncomfortable  Eyes: normal conjunctiva, EOMI, PEERL  Neck: supple, no JVD  Respiratory: Clear to auscultation bilaterally. No wheezes, rales or rhonchi. Normal respiratory effort  Cardiovascular: regular rate and rhythm, S1 and S2, no murmurs, rubs or gallops, no edema, 2+ peripheral pulses  Gastrointestinal: soft, nontender, nondistended, +bowel sounds, no hernia  Skin: warm, dry, no rash  Neurologic: sensation grossly intact, CN grossly intact, non-focal exam  Musculoskeletal: no clubbing, no cyanosis, no joint swelling, no tactile tenderness of chest wall   Psychiatric: A&Ox3, appropriate mood, affect    LABS:                        7.9    8.22  )-----------( 352      ( 22 Oct 2022 07:16 )             27.1     10-22    136  |  100  |  25<H>  ----------------------------<  83  4.2   |  24  |  0.93    Ca    10.0      22 Oct 2022 07:16  Phos  3.8     10-22  Mg     2.20     10-22        I reviewed the above labs, radiology, medications, tests, telemetry, and EKG interpretation.    ASSESSMENT/PLAN:  -Patient complaining of right sided rib and back pain as well as hemoptysis(noted to be scant); both which have been present during admission   - VSS  -CBC, BMP, Cardiac enzymes ordered   -EKG preformed sinus bradycardia; no ischemic changes noted   -Patient offered pain medications: morphine, heat packs and donut pillow to ease pain on coccyx   -Consider GYN consult while inpatient for further evaluation of vaginal bleeding   -Will continue to monitor  -Night events to be conveyed to day team to ensure continuity of care     Low complexity/risk (30min) ACP MEDICINE COVERAGE - Medicine Subsequent Hospital Care Note    CC:56 year old female with PMHx of Asthma, Anemia, Bladder cancer with 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   Contacted by RN due to patient complaining of right sided rib/back pain and hemoptysis. Per patient she has had hemoptysis daily since admission. Patient feels hemoptysis is increasing. She saved the blood in a tissue. Writer visualized scant blood less than a size of a dime with no clotting. Denies palpitations, headache, nausea, vomiting. Per patient pain is transient and has been present on/off during admission. Of note patient notes to vaginal blood when wiping for the past couple of months. Was evaluated by GYN at [place] however full exam including ultrasound, pap smear deferred at that time       Vital Signs Last 24 Hrs  T(C): 36.8 (10-22-22 @ 21:18), Max: 36.8 (10-22-22 @ 21:18)  T(F): 98.3 (10-22-22 @ 21:18), Max: 98.3 (10-22-22 @ 21:18)  HR: 66 (10-22-22 @ 21:18) (66 - 75)  BP: 104/56 (10-22-22 @ 21:18) (98/57 - 110/81)  RR: 18 (10-22-22 @ 21:18) (18 - 18)  SpO2: 100% (10-22-22 @ 21:18) (98% - 100%) on (O2)    PHYSICAL EXAM:  Constitutional: sitting in bed; appears to be uncomfortable  Eyes: normal conjunctiva, EOMI, PEERL  Neck: supple, no JVD  Respiratory: Clear to auscultation bilaterally. No wheezes, rales or rhonchi. Normal respiratory effort  Cardiovascular: regular rate and rhythm, S1 and S2, no murmurs, rubs or gallops, no edema, 2+ peripheral pulses  Gastrointestinal: soft, nontender, nondistended, +bowel sounds, no hernia  Skin: warm, dry, no rash  Neurologic: sensation grossly intact, CN grossly intact, non-focal exam  Musculoskeletal: no clubbing, no cyanosis, no joint swelling, no tactile tenderness of chest wall   Psychiatric: A&Ox3, appropriate mood, affect    LABS:                        7.9    8.22  )-----------( 352      ( 22 Oct 2022 07:16 )             27.1     10-22    136  |  100  |  25<H>  ----------------------------<  83  4.2   |  24  |  0.93    Ca    10.0      22 Oct 2022 07:16  Phos  3.8     10-22  Mg     2.20     10-22        I reviewed the above labs, radiology, medications, tests, telemetry, and EKG interpretation.    ASSESSMENT/PLAN:  -Patient complaining of right sided rib and back pain as well as hemoptysis(noted to be scant); both which have been present during admission   - VSS  -CBC, BMP, Cardiac enzymes ordered   -EKG preformed sinus bradycardia; no ischemic changes noted   -Patient offered pain medications: morphine, heat packs and donut pillow to ease pain on coccyx   -Consider GYN consult while inpatient for further evaluation of vaginal bleeding   -Will continue to monitor  -Advised RN to notify me if notes increase in hemoptysis   -Night events to be conveyed to day team to ensure continuity of care     Low complexity/risk (30min) ACP MEDICINE COVERAGE - Medicine Subsequent Hospital Care Note    CC:56 year old female with PMHx of Asthma, Anemia, Bladder cancer with 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   Contacted by RN due to patient complaining of right sided rib/back pain and hemoptysis. Per patient she has had hemoptysis daily since admission. Patient feels hemoptysis is increasing. She saved the blood in a tissue. Writer visualized scant blood less than a size of a dime with no clotting. Denies palpitations, headache, nausea, vomiting. Per patient pain is transient and has been present on/off during admission. Of note patient notes to vaginal blood when wiping for the past couple of months. Was evaluated by GYN at [place] however full exam including ultrasound, pap smear deferred at that time       Vital Signs Last 24 Hrs  T(C): 36.8 (10-22-22 @ 21:18), Max: 36.8 (10-22-22 @ 21:18)  T(F): 98.3 (10-22-22 @ 21:18), Max: 98.3 (10-22-22 @ 21:18)  HR: 66 (10-22-22 @ 21:18) (66 - 75)  BP: 104/56 (10-22-22 @ 21:18) (98/57 - 110/81)  RR: 18 (10-22-22 @ 21:18) (18 - 18)  SpO2: 100% (10-22-22 @ 21:18) (98% - 100%) on (O2)    PHYSICAL EXAM:  Constitutional: sitting in bed; appears to be uncomfortable  Eyes: normal conjunctiva, EOMI, PEERL  Neck: supple, no JVD  Respiratory: Clear to auscultation bilaterally. No wheezes, rales or rhonchi. Normal respiratory effort  Cardiovascular: regular rate and rhythm, S1 and S2, no murmurs, rubs or gallops, no edema, 2+ peripheral pulses  Gastrointestinal: soft, nontender, nondistended, +bowel sounds, no hernia  Skin: warm, dry, no rash  Neurologic: sensation grossly intact, CN grossly intact, non-focal exam  Musculoskeletal: no clubbing, no cyanosis, no joint swelling, no tactile tenderness of chest wall   Psychiatric: A&Ox3, appropriate mood, affect    LABS:                        7.9    8.22  )-----------( 352      ( 22 Oct 2022 07:16 )             27.1     10-22    136  |  100  |  25<H>  ----------------------------<  83  4.2   |  24  |  0.93    Ca    10.0      22 Oct 2022 07:16  Phos  3.8     10-22  Mg     2.20     10-22        I reviewed the above labs, radiology, medications, tests, telemetry, and EKG interpretation.    ASSESSMENT/PLAN:  -Patient complaining of right sided rib and back pain as well as hemoptysis(noted to be scant; likely 2/2 to pulmonary metastasis); both which have been present during admission   - VSS  -CBC, BMP, Cardiac enzymes ordered and within normal limits   -EKG preformed sinus bradycardia; no ischemic changes noted   -Patient offered pain medications: morphine, heat packs and donut pillow to ease pain on coccyx   -Consider GYN consult while inpatient for further evaluation of vaginal bleeding   -Will continue to monitor  -Advised RN to notify me if notes increase in hemoptysis   -Night events to be conveyed to day team to ensure continuity of care     Low complexity/risk (30min)

## 2022-10-23 NOTE — PROGRESS NOTE ADULT - ASSESSMENT
_________________________________________________________________________________________  ========>>  M E D I C A L   A T T E N D I N G    F O L L O W  U P  N O T E  <<=========  -----------------------------------------------------------------------------------------------------    - Patient seen and examined by me earlier today.   - In summary,  JEF HOUSE is a 56y year old woman admitted with back / flank pain  - Patient today overall doing ok, comfortable, eating fairly  .. overall otherwise the same.. still with pain .. not sleeping much      pt very concerned and afraid of pain meds .. I have had many conversations with the patient and offered several options but she remains very hesitant..        chest discomfort after taking morphine may be related to gas as it happens more than an hour later, and pt has been constipated.            encouraged pt to increase ambulation, take simethicone and bowel regimen and continue to use po morphine PRN for pain     ==================>> REVIEW OF SYSTEM <<=================    GEN: no fever, no chills, on and off pain as above , tearful and emotional at times,.. pt reassured   RESP: no SOB, no cough, no sputum, no more hemoptysis reported   CVS: no chest pain, no palpitations, no edema  GI: no abdominal pain, no nausea, no constipation, no diarrhea  : no dysuria, no frequency, no hematuria in tube or voiding      + occasional chronic vaginal bleeding   Neuro: no headache, no dizziness  Derm : no itching, no rash    ==================>> PHYSICAL EXAM <<=================    GEN: A&O X 3 , NAD , comfortable, pleasant, calm , cachectic   HEENT: NCAT, PERRL, MMM, hearing intact  Neck: supple , no JVD appreciated  CVS: S1S2 , regular , No M/R/G appreciated  PULM: CTA B/L,  no W/R/R appreciated  ABD.: soft. non tender, non distended,  bowel sounds present  Extrem: intact pulses , no edema     nephrostomy in place and draining to bag   PSYCH : normal mood,  not anxious                             ( note written / Date of service   10-23-22 )    ==================>> MEDICATIONS <<====================    ascorbic acid 500 milliGRAM(s) Oral daily  chlorhexidine 2% Cloths 1 Application(s) Topical <User Schedule>  cholecalciferol 2000 Unit(s) Oral daily  enoxaparin Injectable 40 milliGRAM(s) SubCutaneous every 24 hours  ferrous    sulfate 325 milliGRAM(s) Oral daily  folic acid 1 milliGRAM(s) Oral daily  influenza   Vaccine 0.5 milliLiter(s) IntraMuscular once  lidocaine   4% Patch 1 Patch Transdermal every 24 hours  pantoprazole    Tablet 40 milliGRAM(s) Oral before breakfast  senna 2 Tablet(s) Oral at bedtime    MEDICATIONS  (PRN):  acetaminophen     Tablet .. 650 milliGRAM(s) Oral every 6 hours PRN Mild Pain (1 - 3)  aluminum hydroxide/magnesium hydroxide/simethicone Suspension 30 milliLiter(s) Oral every 4 hours PRN Dyspepsia  bisacodyl 5 milliGRAM(s) Oral daily PRN Constipation  melatonin 3 milliGRAM(s) Oral at bedtime PRN Insomnia  morphine   Solution 5 milliGRAM(s) Oral every 4 hours PRN Moderate and Severe pain  ondansetron Injectable 4 milliGRAM(s) IV Push every 8 hours PRN Nausea and/or Vomiting  simethicone 80 milliGRAM(s) Chew four times a day PRN Gas    ___________  Active diet:  Diet, Regular:   High Fiber (HIFIBER)  Supplement Feeding Modality:  Oral  Ensure Plus High Protein Cans or Servings Per Day:  2       Frequency:  Daily  ___________________    ==================>> VITAL SIGNS <<==================     Vital Signs Last 24 HrsT(C): 36.7 (10-23-22 @ 14:50)  T(F): 98.1 (10-23-22 @ 14:50), Max: 98.6 (10-23-22 @ 05:57)  HR: 70 (10-23-22 @ 14:50) (66 - 70)  BP: 114/59 (10-23-22 @ 14:50)  RR: 18 (10-23-22 @ 14:50) (18 - 18)  SpO2: 100% (10-23-22 @ 14:50) (100% - 100%)      POCT Blood Glucose.: 95 mg/dL (22 Oct 2022 21:31)     ==================>> LAB AND IMAGING <<==================                        8.0    9.02  )-----------( 319      ( 23 Oct 2022 04:05 )             27.3        10-23    134<L>  |  98  |  21  ----------------------------<  96  4.0   |  25  |  0.89    Ca    10.4      23 Oct 2022 04:05  Phos  3.5     10-23  Mg     2.10     10-23    ___________________________________________________________________________________  ===============>>  A S S E S S M E N T   A N D   P L A N <<===============  ------------------------------------------------------------------------------------------    · Assessment	  56 year old female with PMHx of Asthma, Anemia, Bladder cancer with 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     Problem/Plan - 1:  ·  Problem: low back / flank pain.   ·  Plan: acute on chronic, involving both hips, with left worse than right. Affecting mobility. Not controlled by Tramadol and Tylenol.   CT scan of abdomen and pelvis: no evidence of osseous metastatic disease.  discussion as above       chest pain after taking morphine is not typical.. pain is not cardiac: likely from gas as pt state has not had a BM for several days >> bowel regimen given ... ) :  bowel regimen and simethiocone as ordered : encouraged   increase ambulation   palliative follow up and mgmt appreciated     Problem/Plan - 2:  ·  Problem: Hemoptysis. on and off, small amounts   CT demonstrates: Numerous bilateral pulmonary metastases; mild interval increase in size of a few of the largest pulmonary metastases compared to 9/20/2022.  - Monitor for now  - pt planned for OP folowup with oncology for treatment / chemo vs immunotherapy     Problem/Plan - 3:  ·  Problem: Hypoglycemia.   ·  Plan: glucose to 59 via FS; non-diabetic, s/p D-50 in the ED  diet prescribed, along with supplements  - Monitor fingersticks.    Problem/Plan - 4:  ·  Problem: Primary cancer of bladder with metastasis to other site.   ·  Plan: with metastases and has left hydronephrosis  s/p right nephrostomy tube in place draining w/o difficulty  - Oncology consult / follow up ( pt states she will be changing oncologists from [facility], Dr Jones, to [facility] / mian)     Problem/Plan - 5:  ·  Problem: Slow transit constipation.   ·  Plan: no bowel movement for the past 3 days while on opiate  - Bowel regimen   - Monitor BM.    Problem/Plan - 6:  ·  Problem: Protein calorie malnutrition.   ·  Plan: Poor PO intake, confused/frustrated as to what to eat at present. no longer taking MVI; will restart  - Nutrition consult     Problem/Plan - 7:  ·  Problem: Prophylactic measure.   ·  Plan: DVT ppx: will do SCD for now, will switch to Lovenox  no PT needs.    discharge planing when pain better controlled     --------------------------------------------  Case discussed with pt, NP  Education given on findings and plan of care  ___________________________  H. NIEVES Ochoa.  Pager: [phone]

## 2022-10-24 NOTE — PROGRESS NOTE ADULT - ASSESSMENT
_________________________________________________________________________________________  ========>>  M E D I C A L   A T T E N D I N G    F O L L O W  U P  N O T E  <<=========  -----------------------------------------------------------------------------------------------------    - Patient seen and examined by me earlier today.   - In summary,  JEF HOUSE is a 56y year old woman admitted with back / flank pain  - Patient today overall doing fairly, eating fairly  .. trying to have a BM ( had a small one post suppository)       pt very picky and concerned about medications for pain and even bowel regimen , not taking most meds recommended     ==================>> REVIEW OF SYSTEM <<=================    GEN: no fever, no chills, on and off pain as above   RESP: no SOB, no cough, no sputum, no more hemoptysis reported   CVS: no chest pain, no palpitations, no edema  GI: no abdominal pain, no nausea, no constipation, no diarrhea  : no dysuria, no frequency, no hematuria in tube or voiding      + occasional chronic vaginal bleeding   Neuro: no headache, no dizziness  Derm : no itching, no rash    ==================>> PHYSICAL EXAM <<=================    GEN: A&O X 3 , NAD , comfortable, pleasant, calm , cachectic   HEENT: NCAT, PERRL, MMM, hearing intact  Neck: supple , no JVD appreciated  CVS: S1S2 , regular , No M/R/G appreciated  PULM: CTA B/L,  no W/R/R appreciated  ABD.: soft. non tender, non distended,  bowel sounds present  Extrem: intact pulses , no edema     nephrostomy in place and draining to bag   PSYCH : normal mood,  not anxious                             ( note written / Date of service   10-24-22 )    ==================>> MEDICATIONS <<====================    ascorbic acid 500 milliGRAM(s) Oral daily  chlorhexidine 2% Cloths 1 Application(s) Topical <User Schedule>  cholecalciferol 2000 Unit(s) Oral daily  enoxaparin Injectable 40 milliGRAM(s) SubCutaneous every 24 hours  ferrous    sulfate 325 milliGRAM(s) Oral daily  folic acid 1 milliGRAM(s) Oral daily  influenza   Vaccine 0.5 milliLiter(s) IntraMuscular once  lidocaine   4% Patch 1 Patch Transdermal every 24 hours  pantoprazole    Tablet 40 milliGRAM(s) Oral before breakfast  saline laxative (FLEET) Rectal Enema 1 Enema Rectal once  senna 2 Tablet(s) Oral at bedtime    MEDICATIONS  (PRN):  acetaminophen     Tablet .. 650 milliGRAM(s) Oral every 6 hours PRN Mild Pain (1 - 3)  aluminum hydroxide/magnesium hydroxide/simethicone Suspension 30 milliLiter(s) Oral every 4 hours PRN Dyspepsia  bisacodyl 5 milliGRAM(s) Oral daily PRN Constipation  melatonin 3 milliGRAM(s) Oral at bedtime PRN Insomnia  morphine   Solution 5 milliGRAM(s) Oral every 4 hours PRN Moderate and Severe pain  ondansetron Injectable 4 milliGRAM(s) IV Push every 8 hours PRN Nausea and/or Vomiting  simethicone 80 milliGRAM(s) Chew four times a day PRN Gas    ___________  Active diet:  Diet, Regular:   High Fiber (HIFIBER)  Supplement Feeding Modality:  Oral  Ensure Plus High Protein Cans or Servings Per Day:  2       Frequency:  Daily  ___________________    ==================>> VITAL SIGNS <<==================     Vital Signs Last 24 HrsT(C): 36.7 (10-24-22 @ 21:27)  T(F): 98 (10-24-22 @ 21:27), Max: 98.7 (10-24-22 @ 13:21)  HR: 62 (10-24-22 @ 21:27) (62 - 74)  BP: 123/56 (10-24-22 @ 21:27)  RR: 18 (10-24-22 @ 21:27) (17 - 18)  SpO2: 100% (10-24-22 @ 21:27) (100% - 100%)       ==================>> LAB AND IMAGING <<==================                        8.0    9.02  )-----------( 319      ( 23 Oct 2022 04:05 )             27.3        10-23    134<L>  |  98  |  21  ----------------------------<  96  4.0   |  25  |  0.89    Ca    10.4      23 Oct 2022 04:05  Phos  3.5     10-23  Mg     2.10     10-23        ___________________________________________________________________________________  ===============>>  A S S E S S M E N T   A N D   P L A N <<===============  ------------------------------------------------------------------------------------------    · Assessment	  56 year old female with PMHx of Asthma, Anemia, Bladder cancer with 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     Problem/Plan - 1:  ·  Problem: low back / flank pain.   ·  Plan: acute on chronic, involving both hips, with left worse than right. Affecting mobility. Not controlled by Tramadol and Tylenol.   CT scan of abdomen and pelvis: no evidence of osseous metastatic disease.  discussion as above       chest pain after taking morphine is not typical.. pain is not cardiac: likely from gas as pt state has not had a BM for several days >> bowel regimen given ... ) :  bowel regimen and simethiocone as ordered : encouraged   increase ambulation   palliative follow up and mgmt   bowel regimen  anti gas meds reordered    Problem/Plan - 2:  ·  Problem: Hemoptysis. on and off, small amounts   CT demonstrates: Numerous bilateral pulmonary metastases; mild interval increase in size of a few of the largest pulmonary metastases compared to 9/20/2022.  - Monitor for now  - pt planned for OP folowup with oncology for treatment / chemo vs immunotherapy     Problem/Plan - 3:  ·  Problem: Hypoglycemia.   ·  Plan: glucose to 59 via FS; non-diabetic, s/p D-50 in the ED  diet prescribed, along with supplements  - Monitor fingersticks.    Problem/Plan - 4:  ·  Problem: Primary cancer of bladder with metastasis to other site.   ·  Plan: with metastases and has left hydronephrosis  s/p right nephrostomy tube in place draining w/o difficulty  - Oncology consult / follow up ( pt states she will be changing oncologists from [facility], Dr Jones, to [facility])     occasional vaginal bleeding      pt declined TVUS and GYN exam before        will follow up with own GYN as OP    Problem/Plan - 5:  ·  Problem: Slow transit constipation.   ·  Plan: no bowel movement for the past 3 days while on opiate  - Bowel regimen as ordered   - Monitor BM.    Problem/Plan - 6:  ·  Problem: Protein calorie malnutrition.   ·  Plan: Poor PO intake, confused/frustrated as to what to eat at present. no longer taking MVI; will restart  - Nutrition consult     Problem/Plan - 7:  ·  Problem: Prophylactic measure.   ·  Plan: DVT ppx: will do SCD for now, will switch to Lovenox  no PT needs.    discharge planing when pain better controlled     --------------------------------------------  Case discussed with pt, NP, RN  Education given on findings and plan of care  ___________________________  H. NIEVES Ochoa.  Pager: [phone]

## 2022-10-25 NOTE — PROGRESS NOTE ADULT - SUBJECTIVE AND OBJECTIVE BOX
SUBJECTIVE AND OBJECTIVE:  Palliative reconsulted for pain management.   Patient was seen this AM, crying and groaning in intense pain. She reports that morphine is giving her pain shortly after taking. Suggested changing to other opioids however she was told other meds are "too strong" by her other medical providers. Patient is also very anxious of various possible side effects and unable to commit to a particular plan initially. She says she has to run it by her pacemaker doctor.   Patient later seen again, again crying from pain. Highly recommended we start morphine ATC. She just received a dose of PO morphine but it will take time to take effect. Suggested IV morphine 5 mg ATC, but she stated that 2 mg worked well for her before. SHe did not want to increase dose. On the phone she had a relative urging her to take pain meds.     INTERVAL HPI/OVERNIGHT EVENTS:      Allergies    No Known Allergies    Intolerances    MEDICATIONS  (STANDING):  acetaminophen     Tablet .. 650 milliGRAM(s) Oral every 6 hours  ascorbic acid 500 milliGRAM(s) Oral daily  chlorhexidine 2% Cloths 1 Application(s) Topical <User Schedule>  cholecalciferol 2000 Unit(s) Oral daily  enoxaparin Injectable 40 milliGRAM(s) SubCutaneous every 24 hours  ferrous    sulfate 325 milliGRAM(s) Oral daily  folic acid 1 milliGRAM(s) Oral daily  influenza   Vaccine 0.5 milliLiter(s) IntraMuscular once  lidocaine   4% Patch 1 Patch Transdermal every 24 hours  morphine  - Injectable 2 milliGRAM(s) IV Push every 4 hours  pantoprazole    Tablet 40 milliGRAM(s) Oral before breakfast  saline laxative (FLEET) Rectal Enema 1 Enema Rectal once  senna 2 Tablet(s) Oral at bedtime    MEDICATIONS  (PRN):  aluminum hydroxide/magnesium hydroxide/simethicone Suspension 30 milliLiter(s) Oral every 4 hours PRN Dyspepsia  bisacodyl 5 milliGRAM(s) Oral daily PRN Constipation  melatonin 3 milliGRAM(s) Oral at bedtime PRN Insomnia  morphine  - Injectable 1 milliGRAM(s) IV Push every 2 hours PRN breakthrough pain  ondansetron Injectable 4 milliGRAM(s) IV Push every 8 hours PRN Nausea and/or Vomiting  simethicone 80 milliGRAM(s) Chew four times a day PRN Gas        ITEMS UNCHECKED ARE NOT PRESENT    PRESENT SYMPTOMS: [ ]Unable to self-report due to altered mental status- see [ ] CPOT [ ] PAINADS [ ] RDOS  Source if other than patient:  [ ]Family   [ ]Team     Pain: [x ]yes [ ]no  QOL impact - moderate  Location -    left chest                Aggravating factors  - movement   Quality - sharp  Radiation - right back and left later side   Timing- intermittent   Severity (0-10 scale): 8  Minimal acceptable level (0-10 scale): 2    Dyspnea:                           [ ]Mild [ ]Moderate [ ]Severe  Anxiety:                             [x ]Mild [ ]Moderate [ ]Severe  Agitation:                          [ ]Mild [ ]Moderate [ ]Severe  Fatigue:                             [ ]Mild [ ]Moderate [ ]Severe  Nausea:                             [ ]Mild [ ]Moderate [ ]Severe  Loss of appetite:              [ ]Mild [ ]Moderate [ ]Severe  Constipation:                   [ ]Mild [ ]Moderate [ ]Severe  Diarrhea:                          [ ]Mild [ ]Moderate [ ]Severe      CPOT:    https://www.[email or web address].org/getattachment/[email or web address]/Critical-Care-Pain-Observation-Tool-(CPOT)    PCSSQ[Palliative Care Spiritual Screening Question]   Severity (0-10): 5  Score of 4 or > indicate consideration of Chaplaincy referral.  Chaplaincy Referral: [x ] yes [ ] refused [ ] following [ ] deferred    Caregiver [place]? : [ ] yes [ ] no [ ] Declined [x ] Deferred              Social work referral [ ] Patient & Family Centered Care Referral [ ]     Anticipatory Grief present?:  [ ] yes [ ] no  [ x] Deferred                  Social work referral [ ] Chaplaincy Referral[ ]    Other Symptoms:  [x ]All other review of systems negative     PHYSICAL EXAM:  Vital Signs Last 24 Hrs  T(C): 37.1 (25 Oct 2022 13:50), Max: 37.1 (25 Oct 2022 13:50)  T(F): 98.7 (25 Oct 2022 13:50), Max: 98.7 (25 Oct 2022 13:50)  HR: 86 (25 Oct 2022 13:50) (60 - 86)  BP: 122/69 (25 Oct 2022 13:50) (106/55 - 123/56)  BP(mean): --  RR: 18 (25 Oct 2022 13:50) (17 - 18)  SpO2: 100% (25 Oct 2022 13:50) (100% - 100%)    Parameters below as of 25 Oct 2022 13:50  Patient On (Oxygen Delivery Method): room air    I&O's Summary     GENERAL:  [ x]Alert  [x ]Oriented x   [ ]Lethargic  [ ]Cachexia  [ ]Unarousable  [ x]Verbal  [ ]Non-Verbal    Behavioral:   [x ] Anxiety  [ ] Delirium [ ] Agitation [ ] Calm     HEENT:  [ x]Normal  [ ] Temporal Wasting  [ ]Dry mouth   [ ]ET Tube/Trach  [ ]Oral lesions  [ ] Mucositis  PULMONARY:   [x ]Clear [ ]Tachypnea  [ ]Audible excessive secretions   [ ]Rhonchi        [ ]Right [ ]Left [ ]Bilateral  [ ]Crackles        [ ]Right [ ]Left [ ]Bilateral  [ ]Wheezing     [ ]Right [ ]Left [ ]Bilateral  [ ]Diminished breath sounds [ ]right [ ]left [ ]bilateral  CARDIOVASCULAR:    [x ]Regular [ ]Irregular [ ]Tachy  [ ]Bradley [ ]Murmur [ ]Other  GASTROINTESTINAL:  [x ]Soft  [ ]Distended   [ ]+BS  [ x]Non tender [ ]Tender  [ ]PEG [ ]OGT/ NGT  Last BM: 10/19   GENITOURINARY: Nephrostomy tube   [ ]Normal [ ] Incontinent   [ ]Oliguria/Anuria   [ ]Lloyd  MUSCULOSKELETAL:   [ x]Normal   [ ]Weakness  [ ]Bed/Wheelchair bound [ ]Edema  [  ] amputation  [  ] contraction  NEUROLOGIC:   [ x]No focal deficits  [ ]Cognitive impairment  [ ]Dysphagia [ ]Dysarthria [ ]Paresis [ ]Other   SKIN: See Nursing Skin Assessment for further details  [x ]Normal    [ ]Rash  [ ]Pressure ulcer(s)       Present on admission [ ]y [ ]n   [  ]  Wound    [  ] hyperpigmentation    CRITICAL CARE:  [ ]Shock Present  [ ]Septic [ ]Cardiogenic [ ]Neurologic [ ]Hypovolemic  [ ]Vasopressors [ ]Inotropes  [ ]Respiratory failure present [ ]Mechanical Ventilation [ ]Non-invasive ventilatory support [ ]High-Flow   [ ]Acute  [ ]Chronic [ ]Hypoxic  [ ]Hypercarbic [ ]Other  [ ]Other organ failure     LABS:                                              8.2    8.09  )-----------( 379      ( 25 Oct 2022 03:58 )             28.6     10-25    136  |  98  |  20  ----------------------------<  93  4.3   |  25  |  0.95    Ca    10.2      25 Oct 2022 03:58  Phos  3.6     10-25  Mg     2.10     10-25        RADIOLOGY & ADDITIONAL STUDIES: reviewed     Protein Calorie Malnutrition Present: [ ]mild [ ]moderate [x ]severe [ ]underweight [ ]morbid obesity  https://www.andeal.org/vault/1660/web/files/ONC/Table_Clinical%20Characteristics%20to%20Document%20Malnutrition-White%20JV%20et%20al%202012.pdf    Height (cm): 162.6 (10-18-22 @ 18:03), 162.6 (09-19-22 @ 14:23), 162.6 (08-15-22 @ 15:45)  Weight (kg): 38 (10-18-22 @ 18:03), 38.3 (09-19-22 @ 14:23), 38.6 (08-15-22 @ 15:45)  BMI (kg/m2): 14.4 (10-18-22 @ 18:03), 14.5 (09-19-22 @ 14:23), 14.6 (08-15-22 @ 15:45)    [ ]PPSV2 < or = 30%  [ ]significant weight loss [ ]poor nutritional intake [ ]anasarca   [ ]Artificial Nutrition    REFERRALS:   [ ]Chaplaincy  [ ]Hospice  [ ]Child Life  [ ]Social Work  [x ]Case management [ ]Holistic Therapy

## 2022-10-25 NOTE — CHART NOTE - NSCHARTNOTEFT_GEN_A_CORE
ACP MEDICINE COVERAGE - Medicine Subsequent Hospital Care Note    CC:  HPI/Subjective:    ROS:  Denies fever, chills, diaphoresis, malaise, night sweats, generalized weakness, headache, changes in vision, dizziness, cough, sputum production, wheezing, hemoptysis, chest pain, palpitations, shortness of breath, dyspnea on exertion, PND, dysphagia, new abdominal pain, nausea, vomiting, diarrhea, constipation, melena, hematochezia, dysuria, increased urinary frequency/urgency, hematuria, nocturia, numbness/weakness/tingling, any other complaints.    [  ] I spoke with the attending, nurse, and family to obtain the history.  [  ] Unable to obtain history due to ___________.    Vital Signs Last 24 Hrs  T(C): 36.7 (10-25-22 @ 21:05), Max: 37.1 (10-25-22 @ 13:50)  T(F): 98.1 (10-25-22 @ 21:05), Max: 98.7 (10-25-22 @ 13:50)  HR: 67 (10-25-22 @ 21:05) (60 - 86)  BP: 102/51 (10-25-22 @ 21:05) (102/51 - 122/69)  RR: 18 (10-25-22 @ 21:05) (17 - 18)  SpO2: 100% (10-25-22 @ 21:05) (100% - 100%) on (O2)    PHYSICAL EXAM:  Constitutional: NAD, well-developed, well-nourished  Ears, nose, Mouth, and Throat: normal external ears and nose, normal hearing, moist oral mucosa  Eyes: normal conjunctiva, EOMI, PEERL  Neck: supple, no JVD  Respiratory: Clear to auscultation bilaterally. No wheezes, rales or rhonchi. Normal respiratory effort  Cardiovascular: regular rate and rhythm, S1 and S2, no murmurs, rubs or gallops, no edema, 2+ peripheral pulses  Gastrointestinal: soft, nontender, nondistended, +bowel sounds, no hernia  Skin: warm, dry, no rash  Neurologic: sensation grossly intact, CN grossly intact, non-focal exam  Musculoskeletal: no clubbing, no cyanosis, no joint swelling  Psychiatric: A&Ox3, appropriate mood, affect    LABS:                        8.2    8.09  )-----------( 379      ( 25 Oct 2022 03:58 )             28.6     10-25    136  |  98  |  20  ----------------------------<  93  4.3   |  25  |  0.95    Ca    10.2      25 Oct 2022 03:58  Phos  3.6     10-25  Mg     2.10     10-25      PT/INR: PT: 14.0 sec | INR: 1.20 ratio (10-17-22 @ 14:52)  PTT: 23.6 sec (10-17-22 @ 14:52)    CARDIAC ENZYMES    Creatine Kinase, Serum: 33 U/L (10-25-22 @ 03:58)  Creatine Kinase, Serum: 33 U/L (10-23-22 @ 04:05)          Serum Pro-Brain Natriuretic Peptide:   D-Dimer Assay:     Urinanalysis Basic (10-25-22 @ 22:59):      Blood Gas Venous (10-25-22 @ 22:59):      Blood Gas Arterial (10-25-22 @ 22:59):      CAPILLARY BLOOD GLUCOSE:  POCT Blood Glucose: 95 mg/dL (10-22-22 @ 21:31)  POCT Blood Glucose: 74 mg/dL (10-19-22 @ 07:50)  POCT Blood Glucose: 136 mg/dL (10-18-22 @ 14:06)      COVID PCR:  NotDetec (10-14-22 @ 06:00)  Detected (10-12-22 @ 06:51)  Detected (10-10-22 @ 06:47)      RADIOLOGY:    MEDICATIONS  (STANDING):  acetaminophen     Tablet .. 650 milliGRAM(s) Oral every 6 hours  ascorbic acid 500 milliGRAM(s) Oral daily  chlorhexidine 2% Cloths 1 Application(s) Topical <User Schedule>  cholecalciferol 2000 Unit(s) Oral daily  colchicine 0.6 milliGRAM(s) Oral once  ferrous    sulfate 325 milliGRAM(s) Oral daily  folic acid 1 milliGRAM(s) Oral daily  influenza   Vaccine 0.5 milliLiter(s) IntraMuscular once  lidocaine   4% Patch 1 Patch Transdermal every 24 hours  morphine  - Injectable 2 milliGRAM(s) IV Push every 4 hours  pantoprazole    Tablet 40 milliGRAM(s) Oral before breakfast  saline laxative (FLEET) Rectal Enema 1 Enema Rectal once  senna 2 Tablet(s) Oral at bedtime    MEDICATIONS  (PRN):  aluminum hydroxide/magnesium hydroxide/simethicone Suspension 30 milliLiter(s) Oral every 4 hours PRN Dyspepsia  bisacodyl 5 milliGRAM(s) Oral daily PRN Constipation  melatonin 3 milliGRAM(s) Oral at bedtime PRN Insomnia  morphine  - Injectable 1 milliGRAM(s) IV Push every 2 hours PRN breakthrough pain  ondansetron Injectable 4 milliGRAM(s) IV Push every 8 hours PRN Nausea and/or Vomiting  simethicone 80 milliGRAM(s) Chew four times a day PRN Gas    I&O's Summary    25 Oct 2022 07:01  -  25 Oct 2022 22:59  --------------------------------------------------------  IN: 0 mL / OUT: 200 mL / NET: -200 mL      I reviewed the above labs, radiology, medications, tests, telemetry, and EKG interpretation.    ASSESSMENT/PLAN:    - Clinical findings, labs, tests, telemetry, and ekg reviewed with attending. Will monitor patient closely.     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   Contacted by RN due to patient complaining of left sided rib and chest pain. No tenderness to palpation. Nurse also noted patient had a large blood clot while urinating. Patient admits to vaginal bleeding in the past but in smaller amounts. As discussed previously patient had vaginal bleeding while hospitalized at [place]. At that time she deferred further examination and workup as patient states that team stated further evaluation posed higher risk of infection. Writer at bedside evaluating patient for the past 3 nights notes that patient believes the Morphine is the cause of chest pain and increased bleeding. Patient previously on PO Morphine changed to IV Morphine today. Per patient's recollection chest pain started 3 hours after administration of medication. Denies SOB, headache, dizziness, palpitations or any other acute symptoms. She notes that chest pain is consistent and that she experiences after taking Morphine.       Vital Signs Last 24 Hrs  T(C): 36.7 (10-25-22 @ 21:05), Max: 37.1 (10-25-22 @ 13:50)  T(F): 98.1 (10-25-22 @ 21:05), Max: 98.7 (10-25-22 @ 13:50)  HR: 67 (10-25-22 @ 21:05) (60 - 86)  BP: 102/51 (10-25-22 @ 21:05) (102/51 - 122/69)  RR: 18 (10-25-22 @ 21:05) (17 - 18)  SpO2: 100% (10-25-22 @ 21:05) (100% - 100%) on (O2)    PHYSICAL EXAM:  Constitutional: sitting in bed, appears anxious   Ears, nose, Mouth, and Throat: normal external ears and nose, normal hearing, moist oral mucosa  Eyes: normal conjunctiva, EOMI, PEERL  Neck: supple, no JVD  Respiratory: Clear to auscultation bilaterally. No wheezes, rales or rhonchi. Normal respiratory effort  Cardiovascular: regular rate and rhythm, S1 and S2, no murmurs, rubs or gallops, no edema, 2+ peripheral pulses  Gastrointestinal: distended, nontender, +bowel sounds, no hernia  Skin: warm, dry, no rash  Neurologic: sensation grossly intact, CN grossly intact, non-focal exam  Musculoskeletal: no clubbing, no cyanosis, no joint swelling  Psychiatric: A&Ox3, appropriate mood, affect    LABS:                        8.2    8.09  )-----------( 379      ( 25 Oct 2022 03:58 )             28.6     10-25    136  |  98  |  20  ----------------------------<  93  4.3   |  25  |  0.95    Ca    10.2      25 Oct 2022 03:58  Phos  3.6     10-25  Mg     2.10     10-25        MEDICATIONS  (STANDING):  acetaminophen     Tablet .. 650 milliGRAM(s) Oral every 6 hours  ascorbic acid 500 milliGRAM(s) Oral daily  chlorhexidine 2% Cloths 1 Application(s) Topical <User Schedule>  cholecalciferol 2000 Unit(s) Oral daily  colchicine 0.6 milliGRAM(s) Oral once  ferrous    sulfate 325 milliGRAM(s) Oral daily  folic acid 1 milliGRAM(s) Oral daily  influenza   Vaccine 0.5 milliLiter(s) IntraMuscular once  lidocaine   4% Patch 1 Patch Transdermal every 24 hours  morphine  - Injectable 2 milliGRAM(s) IV Push every 4 hours  pantoprazole    Tablet 40 milliGRAM(s) Oral before breakfast  saline laxative (FLEET) Rectal Enema 1 Enema Rectal once  senna 2 Tablet(s) Oral at bedtime      I reviewed the above labs, radiology, medications, tests, telemetry, and EKG interpretation.    ASSESSMENT/PLAN:  -Patient complaining of chest pain which appears MSK in nature; may also have GI component due to constipation   -VSS   -EKG  -Cardiac enzymes, CBC ordered   -Contacted GYN to request a consult for further evaluation of vaginal bleeding   -Discussed case with attending, Dr. Velasco who advised to offer Colchicine to the patient which patient refused  -Attending advised not to do further cardiac testing for future episodes as not cardiac in nature   -Patient seems to be hesitant about taking any pain medications   -Palliative team to be reconsulted in AM to assist with transitioning from Morphine to an alternative medication  -Will continue to monitor    Low complexity/risk (30min) ACP MEDICINE COVERAGE - Medicine Subsequent Hospital Care Note    CC: 56 year old female with PMHx of Asthma, Anemia, Bladder cancer with 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   Contacted by RN due to patient complaining of left sided rib and chest pain. No tenderness to palpation. Nurse also noted patient had a large blood clot while urinating. Patient admits to vaginal bleeding in the past but in smaller amounts. As discussed previously patient had vaginal bleeding while hospitalized at [place]. At that time she deferred further examination and workup as patient states that team stated further evaluation posed higher risk of infection. Writer at bedside evaluating patient for the past 3 nights notes that patient believes the Morphine is the cause of chest pain and increased bleeding. Patient previously on PO Morphine changed to IV Morphine today. Per patient's recollection chest pain started 3 hours after administration of medication. Denies SOB, headache, dizziness, palpitations or any other acute symptoms. She notes that chest pain is consistent and that she experiences after taking Morphine.       Vital Signs Last 24 Hrs  T(C): 36.7 (10-25-22 @ 21:05), Max: 37.1 (10-25-22 @ 13:50)  T(F): 98.1 (10-25-22 @ 21:05), Max: 98.7 (10-25-22 @ 13:50)  HR: 67 (10-25-22 @ 21:05) (60 - 86)  BP: 102/51 (10-25-22 @ 21:05) (102/51 - 122/69)  RR: 18 (10-25-22 @ 21:05) (17 - 18)  SpO2: 100% (10-25-22 @ 21:05) (100% - 100%) on (O2)    PHYSICAL EXAM:  Constitutional: sitting in bed, appears anxious   Ears, nose, Mouth, and Throat: normal external ears and nose, normal hearing, moist oral mucosa  Eyes: normal conjunctiva, EOMI, PEERL  Neck: supple, no JVD  Respiratory: Clear to auscultation bilaterally. No wheezes, rales or rhonchi. Normal respiratory effort  Cardiovascular: regular rate and rhythm, S1 and S2, no murmurs, rubs or gallops, no edema, 2+ peripheral pulses  Gastrointestinal: distended, nontender, +bowel sounds, no hernia  Skin: warm, dry, no rash  Neurologic: sensation grossly intact, CN grossly intact, non-focal exam  Musculoskeletal: no clubbing, no cyanosis, no joint swelling  Psychiatric: A&Ox3, appropriate mood, affect    LABS:                        8.2    8.09  )-----------( 379      ( 25 Oct 2022 03:58 )             28.6     10-25    136  |  98  |  20  ----------------------------<  93  4.3   |  25  |  0.95    Ca    10.2      25 Oct 2022 03:58  Phos  3.6     10-25  Mg     2.10     10-25        MEDICATIONS  (STANDING):  acetaminophen     Tablet .. 650 milliGRAM(s) Oral every 6 hours  ascorbic acid 500 milliGRAM(s) Oral daily  chlorhexidine 2% Cloths 1 Application(s) Topical <User Schedule>  cholecalciferol 2000 Unit(s) Oral daily  colchicine 0.6 milliGRAM(s) Oral once  ferrous    sulfate 325 milliGRAM(s) Oral daily  folic acid 1 milliGRAM(s) Oral daily  influenza   Vaccine 0.5 milliLiter(s) IntraMuscular once  lidocaine   4% Patch 1 Patch Transdermal every 24 hours  morphine  - Injectable 2 milliGRAM(s) IV Push every 4 hours  pantoprazole    Tablet 40 milliGRAM(s) Oral before breakfast  saline laxative (FLEET) Rectal Enema 1 Enema Rectal once  senna 2 Tablet(s) Oral at bedtime      I reviewed the above labs, radiology, medications, tests, telemetry, and EKG interpretation.    ASSESSMENT/PLAN:  -Patient complaining of chest pain which appears MSK in nature; may also have GI component due to constipation   -VSS   -EKG  -Cardiac enzymes, CBC ordered   -Contacted GYN to request a consult for further evaluation of vaginal bleeding   -Discussed case with attending, Dr. Velasco who advised to offer Colchicine to the patient which she declined   -Attending advised not to do further cardiac testing for future episodes as not cardiac in nature   -Patient seems to be hesitant about taking any pain medications   -Palliative team to be reconsulted in AM to assist with transitioning from Morphine to an alternative medication  -Will continue to monitor    Low complexity/risk (30min) ACP MEDICINE COVERAGE - Medicine Subsequent Hospital Care Note    CC: 56 year old female with PMHx of Asthma, Anemia, Bladder cancer with 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   Contacted by RN due to patient complaining of left sided rib and chest pain. No tenderness to palpation. Nurse also noted patient had a large blood clot while urinating. Patient admits to vaginal bleeding in the past but in smaller amounts. As discussed previously patient had vaginal bleeding while hospitalized at [place]. At that time she deferred further examination and workup as patient states that team stated further evaluation posed higher risk of infection. Writer at bedside evaluating patient for the past 3 nights notes that patient believes the Morphine is the cause of chest pain and increased bleeding. Patient previously on PO Morphine changed to IV Morphine today. Per patient's recollection chest pain started 3 hours after administration of medication. Denies SOB, headache, dizziness, palpitations or any other acute symptoms. She notes that chest pain is consistent and that she experiences after taking Morphine.       Vital Signs Last 24 Hrs  T(C): 36.7 (10-25-22 @ 21:05), Max: 37.1 (10-25-22 @ 13:50)  T(F): 98.1 (10-25-22 @ 21:05), Max: 98.7 (10-25-22 @ 13:50)  HR: 67 (10-25-22 @ 21:05) (60 - 86)  BP: 102/51 (10-25-22 @ 21:05) (102/51 - 122/69)  RR: 18 (10-25-22 @ 21:05) (17 - 18)  SpO2: 100% (10-25-22 @ 21:05) (100% - 100%) on (O2)    PHYSICAL EXAM:  Constitutional: sitting in bed, appears anxious   Ears, nose, Mouth, and Throat: normal external ears and nose, normal hearing, moist oral mucosa  Eyes: normal conjunctiva, EOMI, PEERL  Neck: supple, no JVD  Respiratory: Clear to auscultation bilaterally. No wheezes, rales or rhonchi. Normal respiratory effort  Cardiovascular: regular rate and rhythm, S1 and S2, no murmurs, rubs or gallops, no edema, 2+ peripheral pulses  Gastrointestinal: distended, nontender, +bowel sounds, no hernia  Skin: warm, dry, no rash  Neurologic: sensation grossly intact, CN grossly intact, non-focal exam  Musculoskeletal: no clubbing, no cyanosis, no joint swelling  Psychiatric: A&Ox3, appropriate mood, affect    LABS:                        8.2    8.09  )-----------( 379      ( 25 Oct 2022 03:58 )             28.6     10-25    136  |  98  |  20  ----------------------------<  93  4.3   |  25  |  0.95    Ca    10.2      25 Oct 2022 03:58  Phos  3.6     10-25  Mg     2.10     10-25        MEDICATIONS  (STANDING):  acetaminophen     Tablet .. 650 milliGRAM(s) Oral every 6 hours  ascorbic acid 500 milliGRAM(s) Oral daily  chlorhexidine 2% Cloths 1 Application(s) Topical <User Schedule>  cholecalciferol 2000 Unit(s) Oral daily  colchicine 0.6 milliGRAM(s) Oral once  ferrous    sulfate 325 milliGRAM(s) Oral daily  folic acid 1 milliGRAM(s) Oral daily  influenza   Vaccine 0.5 milliLiter(s) IntraMuscular once  lidocaine   4% Patch 1 Patch Transdermal every 24 hours  morphine  - Injectable 2 milliGRAM(s) IV Push every 4 hours  pantoprazole    Tablet 40 milliGRAM(s) Oral before breakfast  saline laxative (FLEET) Rectal Enema 1 Enema Rectal once  senna 2 Tablet(s) Oral at bedtime      I reviewed the above labs, radiology, medications, tests, telemetry, and EKG interpretation.    ASSESSMENT/PLAN:  -Patient complaining of chest pain which appears MSK in nature; may also have GI component due to constipation   -VSS   -EKG  -Cardiac enzymes, CBC ordered   -Contacted GYN to request a consult for further evaluation of vaginal bleeding   -DVT ppx discontinued in light of bleeding  -Discussed case with attending, Dr. Velasco who advised to offer Colchicine to the patient which she declined   -Attending advised not to do further cardiac testing for future episodes as not cardiac in nature   -Patient seems to be hesitant about taking any pain medications   -Palliative team to be reconsulted in AM to assist with transitioning from Morphine to an alternative medication  -Will continue to monitor    Low complexity/risk (30min) ACP MEDICINE COVERAGE - Medicine Subsequent Hospital Care Note    CC: 56 year old female with PMHx of Asthma, Anemia, Bladder cancer with 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   Contacted by RN due to patient complaining of left sided rib and chest pain. No tenderness to palpation. Nurse also noted patient had a large blood clot while urinating. Patient admits to vaginal bleeding in the past but in smaller amounts. As discussed previously patient had vaginal bleeding while hospitalized at [place]. At that time she deferred further examination and workup as patient states that team stated further evaluation posed higher risk of infection. Writer at bedside evaluating patient for the past 3 nights notes that patient believes the Morphine is the cause of chest pain and increased bleeding. Patient previously on PO Morphine changed to IV Morphine today. Per patient's recollection chest pain started 3 hours after administration of medication. Denies SOB, headache, dizziness, palpitations or any other acute symptoms. She notes that chest pain is consistent and that she experiences after taking Morphine.       Vital Signs Last 24 Hrs  T(C): 36.7 (10-25-22 @ 21:05), Max: 37.1 (10-25-22 @ 13:50)  T(F): 98.1 (10-25-22 @ 21:05), Max: 98.7 (10-25-22 @ 13:50)  HR: 67 (10-25-22 @ 21:05) (60 - 86)  BP: 102/51 (10-25-22 @ 21:05) (102/51 - 122/69)  RR: 18 (10-25-22 @ 21:05) (17 - 18)  SpO2: 100% (10-25-22 @ 21:05) (100% - 100%) on (O2)    PHYSICAL EXAM:  Constitutional: sitting in bed, appears anxious   Ears, nose, Mouth, and Throat: normal external ears and nose, normal hearing, moist oral mucosa  Eyes: normal conjunctiva, EOMI, PEERL  Neck: supple, no JVD  Respiratory: Clear to auscultation bilaterally. No wheezes, rales or rhonchi. Normal respiratory effort  Cardiovascular: regular rate and rhythm, S1 and S2, no murmurs, rubs or gallops, no edema, 2+ peripheral pulses  Gastrointestinal: distended, nontender, +bowel sounds, no hernia  Skin: warm, dry, no rash  Neurologic: sensation grossly intact, CN grossly intact, non-focal exam  Musculoskeletal: no clubbing, no cyanosis, no joint swelling  Psychiatric: A&Ox3, appropriate mood, affect    LABS:                        8.2    8.09  )-----------( 379      ( 25 Oct 2022 03:58 )             28.6     10-25    136  |  98  |  20  ----------------------------<  93  4.3   |  25  |  0.95    Ca    10.2      25 Oct 2022 03:58  Phos  3.6     10-25  Mg     2.10     10-25        MEDICATIONS  (STANDING):  acetaminophen     Tablet .. 650 milliGRAM(s) Oral every 6 hours  ascorbic acid 500 milliGRAM(s) Oral daily  chlorhexidine 2% Cloths 1 Application(s) Topical <User Schedule>  cholecalciferol 2000 Unit(s) Oral daily  colchicine 0.6 milliGRAM(s) Oral once  ferrous    sulfate 325 milliGRAM(s) Oral daily  folic acid 1 milliGRAM(s) Oral daily  influenza   Vaccine 0.5 milliLiter(s) IntraMuscular once  lidocaine   4% Patch 1 Patch Transdermal every 24 hours  morphine  - Injectable 2 milliGRAM(s) IV Push every 4 hours  pantoprazole    Tablet 40 milliGRAM(s) Oral before breakfast  saline laxative (FLEET) Rectal Enema 1 Enema Rectal once  senna 2 Tablet(s) Oral at bedtime      I reviewed the above labs, radiology, medications, tests, telemetry, and EKG interpretation.    ASSESSMENT/PLAN:  -Patient complaining of chest pain which appears MSK in nature; may also have GI component due to constipation   -VSS   -EKG  -Cardiac enzymes, CBC ordered   -Contacted GYN to request a consult for further evaluation of vaginal bleeding   -DVT ppx discontinued in light of bleeding  -Discussed case with attending, Dr. Velasco who advised to offer Colchicine to the patient which she declined   -Attending advised not to do further cardiac testing for future episodes as not cardiac in nature   -Patient seems to be hesitant about taking any pain medications   -Palliative team to be reconsulted in AM to assist with transitioning from Morphine to an alternative medication  -Contacted palliative MD suggested trial of switching from Morphine 2 mg IVP q2hr PRN to Dilaudid 0.2 mg IV q2hr PRN for breakthrough pain (hold for hypotension, oversedation, respiratory depression). Provider went back in to discuss with patient but she was asleep. Will discuss if pain resurfaces.   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   Contacted by RN due to patient complaining of left sided rib and chest pain. No tenderness to palpation. Nurse also noted patient had a large blood clot while urinating. Patient admits to vaginal bleeding in the past but in smaller amounts. As discussed previously patient had vaginal bleeding while hospitalized at [place]. At that time she deferred further examination and workup as patient states that team stated further evaluation posed higher risk of infection. Writer at bedside evaluating patient for the past 3 nights notes that patient believes the Morphine is the cause of chest pain and increased bleeding. Patient previously on PO Morphine changed to IV Morphine today. Per patient's recollection chest pain started 3 hours after administration of medication. Denies SOB, headache, dizziness, palpitations or any other acute symptoms. She notes that chest pain is consistent and that she experiences after taking Morphine.       Vital Signs Last 24 Hrs  T(C): 36.7 (10-25-22 @ 21:05), Max: 37.1 (10-25-22 @ 13:50)  T(F): 98.1 (10-25-22 @ 21:05), Max: 98.7 (10-25-22 @ 13:50)  HR: 67 (10-25-22 @ 21:05) (60 - 86)  BP: 102/51 (10-25-22 @ 21:05) (102/51 - 122/69)  RR: 18 (10-25-22 @ 21:05) (17 - 18)  SpO2: 100% (10-25-22 @ 21:05) (100% - 100%) on (O2)    PHYSICAL EXAM:  Constitutional: sitting in bed, appears anxious   Ears, nose, Mouth, and Throat: normal external ears and nose, normal hearing, moist oral mucosa  Eyes: normal conjunctiva, EOMI, PEERL  Neck: supple, no JVD  Respiratory: Clear to auscultation bilaterally. No wheezes, rales or rhonchi. Normal respiratory effort  Cardiovascular: regular rate and rhythm, S1 and S2, no murmurs, rubs or gallops, no edema, 2+ peripheral pulses  Gastrointestinal: distended, nontender, +bowel sounds, no hernia  Skin: warm, dry, no rash  Neurologic: sensation grossly intact, CN grossly intact, non-focal exam  Musculoskeletal: no clubbing, no cyanosis, no joint swelling  Psychiatric: A&Ox3, appropriate mood, affect    LABS:                        8.2    8.09  )-----------( 379      ( 25 Oct 2022 03:58 )             28.6     10-25    136  |  98  |  20  ----------------------------<  93  4.3   |  25  |  0.95    Ca    10.2      25 Oct 2022 03:58  Phos  3.6     10-25  Mg     2.10     10-25        MEDICATIONS  (STANDING):  acetaminophen     Tablet .. 650 milliGRAM(s) Oral every 6 hours  ascorbic acid 500 milliGRAM(s) Oral daily  chlorhexidine 2% Cloths 1 Application(s) Topical <User Schedule>  cholecalciferol 2000 Unit(s) Oral daily  colchicine 0.6 milliGRAM(s) Oral once  ferrous    sulfate 325 milliGRAM(s) Oral daily  folic acid 1 milliGRAM(s) Oral daily  influenza   Vaccine 0.5 milliLiter(s) IntraMuscular once  lidocaine   4% Patch 1 Patch Transdermal every 24 hours  morphine  - Injectable 2 milliGRAM(s) IV Push every 4 hours  pantoprazole    Tablet 40 milliGRAM(s) Oral before breakfast  saline laxative (FLEET) Rectal Enema 1 Enema Rectal once  senna 2 Tablet(s) Oral at bedtime      I reviewed the above labs, radiology, medications, tests, telemetry, and EKG interpretation.    ASSESSMENT/PLAN:  -Patient complaining of chest pain which appears MSK in nature; may also have GI component due to constipation   -VSS, hemodynamically stable   -EKG- normal sinus rhythm   -Cardiac enzymes, CBC ordered   -Contacted GYN to request a consult for further evaluation of vaginal bleeding   -DVT ppx discontinued in light of bleeding  -Discussed case with attending, Dr. Velasco who advised to offer Colchicine to the patient which she declined   -Attending advised not to do further cardiac testing for future episodes as not cardiac in nature   -Patient seems to be hesitant about taking any pain medications   -Palliative team to be reconsulted in AM to assist with transitioning from Morphine to an alternative medication  -Contacted palliative MD suggested trial of switching from Morphine 2 mg IVP q2hr PRN to Dilaudid 0.2 mg IV q2hr PRN for breakthrough pain (hold for hypotension, oversedation, respiratory depression). Provider went back in to discuss with patient but she was asleep. Will discuss if pain resurfaces.   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   Contacted by RN due to patient complaining of left sided rib and chest pain. No tenderness to palpation. Nurse also noted patient had a large blood clot while urinating. Patient admits to vaginal bleeding in the past but in smaller amounts. As discussed previously patient had vaginal bleeding while hospitalized at [place]. At that time she deferred further examination and workup as patient states that team stated further evaluation posed higher risk of infection. Writer at bedside evaluating patient for the past 3 nights notes that patient believes the Morphine is the cause of chest pain and increased bleeding. Patient previously on PO Morphine changed to IV Morphine today. Per patient's recollection chest pain started 3 hours after administration of medication. Denies SOB, headache, dizziness, palpitations or any other acute symptoms. She notes that chest pain is consistent and that she experiences after taking Morphine.       Vital Signs Last 24 Hrs  T(C): 36.7 (10-25-22 @ 21:05), Max: 37.1 (10-25-22 @ 13:50)  T(F): 98.1 (10-25-22 @ 21:05), Max: 98.7 (10-25-22 @ 13:50)  HR: 67 (10-25-22 @ 21:05) (60 - 86)  BP: 102/51 (10-25-22 @ 21:05) (102/51 - 122/69)  RR: 18 (10-25-22 @ 21:05) (17 - 18)  SpO2: 100% (10-25-22 @ 21:05) (100% - 100%) on (O2)    PHYSICAL EXAM:  Constitutional: sitting in bed, appears anxious   Ears, nose, Mouth, and Throat: normal external ears and nose, normal hearing, moist oral mucosa  Eyes: normal conjunctiva, EOMI, PEERL  Neck: supple, no JVD  Respiratory: Clear to auscultation bilaterally. No wheezes, rales or rhonchi. Normal respiratory effort  Cardiovascular: regular rate and rhythm, S1 and S2, no murmurs, rubs or gallops, no edema, 2+ peripheral pulses  Gastrointestinal: distended, nontender, +bowel sounds, no hernia  Skin: warm, dry, no rash  Neurologic: sensation grossly intact, CN grossly intact, non-focal exam  Musculoskeletal: no clubbing, no cyanosis, no joint swelling  Psychiatric: A&Ox3, appropriate mood, affect    LABS:                        8.2    8.09  )-----------( 379      ( 25 Oct 2022 03:58 )             28.6     10-25    136  |  98  |  20  ----------------------------<  93  4.3   |  25  |  0.95    Ca    10.2      25 Oct 2022 03:58  Phos  3.6     10-25  Mg     2.10     10-25        MEDICATIONS  (STANDING):  acetaminophen     Tablet .. 650 milliGRAM(s) Oral every 6 hours  ascorbic acid 500 milliGRAM(s) Oral daily  chlorhexidine 2% Cloths 1 Application(s) Topical <User Schedule>  cholecalciferol 2000 Unit(s) Oral daily  colchicine 0.6 milliGRAM(s) Oral once  ferrous    sulfate 325 milliGRAM(s) Oral daily  folic acid 1 milliGRAM(s) Oral daily  influenza   Vaccine 0.5 milliLiter(s) IntraMuscular once  lidocaine   4% Patch 1 Patch Transdermal every 24 hours  morphine  - Injectable 2 milliGRAM(s) IV Push every 4 hours  pantoprazole    Tablet 40 milliGRAM(s) Oral before breakfast  saline laxative (FLEET) Rectal Enema 1 Enema Rectal once  senna 2 Tablet(s) Oral at bedtime      I reviewed the above labs, radiology, medications, tests, telemetry, and EKG interpretation.    ASSESSMENT/PLAN:  -Patient complaining of chest pain which appears MSK in nature; may also have GI component due to constipation   -VSS, hemodynamically stable   -EKG- normal sinus rhythm   -Cardiac enzymes, CBC ordered- Hgb 7.6(will continue to trend)  -Contacted GYN to request a consult for further evaluation of vaginal bleeding   -DVT ppx discontinued in light of bleeding  -Advised RN to notify me of subsequent bleeding  -Discussed case with attending, Dr. Velasco who advised to offer Colchicine to the patient which she declined   -Attending advised not to do further cardiac testing for future episodes as not cardiac in nature   -Patient seems to be hesitant about taking any pain medications   -Contacted palliative MD suggested trial of switching from Morphine 2 mg IVP q2hr PRN to Dilaudid 0.2 mg IV q2hr PRN for breakthrough pain (hold for hypotension, oversedation, respiratory depression). Provider went back in to discuss with patient but she was asleep. Will discuss if pain resurfaces.   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   Contacted by RN due to patient complaining of left sided rib and chest pain. No tenderness to palpation. Nurse also noted patient had a large blood clot while urinating. Patient admits to vaginal bleeding in the past but in smaller amounts. As discussed previously patient had vaginal bleeding while hospitalized at [place]. At that time she deferred further examination and workup as patient states that team stated further evaluation posed higher risk of infection. Writer at bedside evaluating patient for the past 3 nights notes that patient believes the Morphine is the cause of chest pain and increased bleeding. Patient previously on PO Morphine changed to IV Morphine today. Per patient's recollection chest pain started 3 hours after administration of medication. Denies SOB, headache, dizziness, palpitations or any other acute symptoms. She notes that chest pain is consistent and that she experiences after taking Morphine.       Vital Signs Last 24 Hrs  T(C): 36.7 (10-25-22 @ 21:05), Max: 37.1 (10-25-22 @ 13:50)  T(F): 98.1 (10-25-22 @ 21:05), Max: 98.7 (10-25-22 @ 13:50)  HR: 67 (10-25-22 @ 21:05) (60 - 86)  BP: 102/51 (10-25-22 @ 21:05) (102/51 - 122/69)  RR: 18 (10-25-22 @ 21:05) (17 - 18)  SpO2: 100% (10-25-22 @ 21:05) (100% - 100%) on (O2)    PHYSICAL EXAM:  Constitutional: sitting in bed, appears anxious   Ears, nose, Mouth, and Throat: normal external ears and nose, normal hearing, moist oral mucosa  Eyes: normal conjunctiva, EOMI, PEERL  Neck: supple, no JVD  Respiratory: Clear to auscultation bilaterally. No wheezes, rales or rhonchi. Normal respiratory effort  Cardiovascular: regular rate and rhythm, S1 and S2, no murmurs, rubs or gallops, no edema, 2+ peripheral pulses  Gastrointestinal: distended, nontender, +bowel sounds, no hernia  Skin: warm, dry, no rash  Neurologic: sensation grossly intact, CN grossly intact, non-focal exam  Musculoskeletal: no clubbing, no cyanosis, no joint swelling  Psychiatric: A&Ox3, appropriate mood, affect    LABS:                        8.2    8.09  )-----------( 379      ( 25 Oct 2022 03:58 )             28.6     10-25    136  |  98  |  20  ----------------------------<  93  4.3   |  25  |  0.95    Ca    10.2      25 Oct 2022 03:58  Phos  3.6     10-25  Mg     2.10     10-25        MEDICATIONS  (STANDING):  acetaminophen     Tablet .. 650 milliGRAM(s) Oral every 6 hours  ascorbic acid 500 milliGRAM(s) Oral daily  chlorhexidine 2% Cloths 1 Application(s) Topical <User Schedule>  cholecalciferol 2000 Unit(s) Oral daily  colchicine 0.6 milliGRAM(s) Oral once  ferrous    sulfate 325 milliGRAM(s) Oral daily  folic acid 1 milliGRAM(s) Oral daily  influenza   Vaccine 0.5 milliLiter(s) IntraMuscular once  lidocaine   4% Patch 1 Patch Transdermal every 24 hours  morphine  - Injectable 2 milliGRAM(s) IV Push every 4 hours  pantoprazole    Tablet 40 milliGRAM(s) Oral before breakfast  saline laxative (FLEET) Rectal Enema 1 Enema Rectal once  senna 2 Tablet(s) Oral at bedtime      I reviewed the above labs, radiology, medications, tests, telemetry, and EKG interpretation.    ASSESSMENT/PLAN:  -Patient complaining of chest pain which appears MSK in nature; may also have GI component due to constipation   -VSS, hemodynamically stable   -EKG- normal sinus rhythm   -Cardiac enzymes(-), CBC ordered- Hgb 7.6(will continue to trend)  -Contacted GYN to request a consult for further evaluation of vaginal bleeding   -DVT ppx discontinued in light of bleeding  -Advised RN to notify me of subsequent bleeding  -Discussed case with attending, Dr. Velasco who advised to offer Colchicine to the patient which she declined   -Attending advised not to do further cardiac testing for future episodes as not cardiac in nature   -Patient seems to be hesitant about taking any pain medications   -Contacted palliative MD suggested trial of switching from Morphine 2 mg IVP q2hr PRN to Dilaudid 0.2 mg IV q2hr PRN for breakthrough pain (hold for hypotension, oversedation, respiratory depression). Provider went back in to discuss with patient but she was asleep. Will discuss if pain resurfaces.   -Will continue to monitor    Low complexity/risk (30min)

## 2022-10-25 NOTE — CHART NOTE - NSCHARTNOTEFT_GEN_A_CORE
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   Contacted by RN due to patient complaining of right sided rib/back pain and hemoptysis. Per patient she has had hemoptysis daily since admission. Patient feels hemoptysis is increasing. She saved the blood in a tissue. Writer visualized scant blood less than a size of a dime with no clotting. Denies palpitations, headache, nausea, vomiting. Per patient pain is transient and has been present on/off during admission. Of note patient notes to vaginal blood when wiping for the past couple of months. Was evaluated by GYN at [facility] however full exam including ultrasound, pap smear deferred at that time       ROS:  Denies fever, chills, diaphoresis, malaise, night sweats, generalized weakness, headache, changes in vision, dizziness, cough, sputum production, wheezing, hemoptysis, chest pain, palpitations, shortness of breath, dyspnea on exertion, PND, dysphagia, new abdominal pain, nausea, vomiting, diarrhea, constipation, melena, hematochezia, dysuria, increased urinary frequency/urgency, hematuria, nocturia, numbness/weakness/tingling, any other complaints.    [  ] I spoke with the attending, nurse, and family to obtain the history.  [  ] Unable to obtain history due to ___________.    Vital Signs Last 24 Hrs  T(C): 36.7 (10-24-22 @ 21:27), Max: 37.1 (10-24-22 @ 13:21)  T(F): 98 (10-24-22 @ 21:27), Max: 98.7 (10-24-22 @ 13:21)  HR: 62 (10-24-22 @ 21:27) (62 - 74)  BP: 123/56 (10-24-22 @ 21:27) (115/75 - 124/66)  RR: 18 (10-24-22 @ 21:27) (17 - 18)  SpO2: 100% (10-24-22 @ 21:27) (100% - 100%) on (O2)    PHYSICAL EXAM:  Constitutional: NAD, well-developed, well-nourished  Ears, nose, Mouth, and Throat: normal external ears and nose, normal hearing, moist oral mucosa  Eyes: normal conjunctiva, EOMI, PEERL  Neck: supple, no JVD  Respiratory: Clear to auscultation bilaterally. No wheezes, rales or rhonchi. Normal respiratory effort  Cardiovascular: regular rate and rhythm, S1 and S2, no murmurs, rubs or gallops, no edema, 2+ peripheral pulses  Gastrointestinal: soft, nontender, nondistended, +bowel sounds, no hernia  Skin: warm, dry, no rash  Neurologic: sensation grossly intact, CN grossly intact, non-focal exam  Musculoskeletal: no clubbing, no cyanosis, no joint swelling  Psychiatric: A&Ox3, appropriate mood, affect    LABS:                        8.0    9.02  )-----------( 319      ( 23 Oct 2022 04:05 )             27.3     10-23    134<L>  |  98  |  21  ----------------------------<  96  4.0   |  25  |  0.89    Ca    10.4      23 Oct 2022 04:05  Phos  3.5     10-23  Mg     2.10     10-23      PT/INR: PT: 14.0 sec | INR: 1.20 ratio (10-17-22 @ 14:52)  PTT: 23.6 sec (10-17-22 @ 14:52)    CARDIAC ENZYMES    Creatine Kinase, Serum: 33 U/L (10-23-22 @ 04:05)          Serum Pro-Brain Natriuretic Peptide:   D-Dimer Assay:     Urinanalysis Basic (10-25-22 @ 03:16):      Blood Gas Venous (10-25-22 @ 03:16):      Blood Gas Arterial (10-25-22 @ 03:16):      CAPILLARY BLOOD GLUCOSE:  POCT Blood Glucose: 95 mg/dL (10-22-22 @ 21:31)  POCT Blood Glucose: 74 mg/dL (10-19-22 @ 07:50)  POCT Blood Glucose: 136 mg/dL (10-18-22 @ 14:06)      COVID PCR:  NotDetec (10-14-22 @ 06:00)  Detected (10-12-22 @ 06:51)  Detected (10-10-22 @ 06:47)      RADIOLOGY:    MEDICATIONS  (STANDING):  ascorbic acid 500 milliGRAM(s) Oral daily  chlorhexidine 2% Cloths 1 Application(s) Topical <User Schedule>  cholecalciferol 2000 Unit(s) Oral daily  enoxaparin Injectable 40 milliGRAM(s) SubCutaneous every 24 hours  ferrous    sulfate 325 milliGRAM(s) Oral daily  folic acid 1 milliGRAM(s) Oral daily  influenza   Vaccine 0.5 milliLiter(s) IntraMuscular once  lidocaine   4% Patch 1 Patch Transdermal every 24 hours  pantoprazole    Tablet 40 milliGRAM(s) Oral before breakfast  saline laxative (FLEET) Rectal Enema 1 Enema Rectal once  senna 2 Tablet(s) Oral at bedtime    MEDICATIONS  (PRN):  acetaminophen     Tablet .. 650 milliGRAM(s) Oral every 6 hours PRN Mild Pain (1 - 3)  aluminum hydroxide/magnesium hydroxide/simethicone Suspension 30 milliLiter(s) Oral every 4 hours PRN Dyspepsia  bisacodyl 5 milliGRAM(s) Oral daily PRN Constipation  melatonin 3 milliGRAM(s) Oral at bedtime PRN Insomnia  morphine   Solution 5 milliGRAM(s) Oral every 4 hours PRN Moderate and Severe pain  ondansetron Injectable 4 milliGRAM(s) IV Push every 8 hours PRN Nausea and/or Vomiting  simethicone 80 milliGRAM(s) Chew four times a day PRN Gas    I&O's Summary    I reviewed the above labs, radiology, medications, tests, telemetry, and EKG interpretation.    ASSESSMENT/PLAN:    - Clinical findings, labs, tests, telemetry, and ekg reviewed with attending. Will monitor patient closely.     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   Contacted by RN due to patient complaining of left sided rib and chest pain. No tenderness to palpation. Admits to chronic back pain and transient SOB( she is unsure if due to wearing face mask). Denies palpitation, headache, nausea or gas pain. Of note patient states chest pain begins shortly after Morphine given. She experienced a similar episode earlier yesterday. Chest and back pain have occurred transiently during hospitalization. Patient admits she does not like take medications as she only took one Tylenol tablet at home.     Vital Signs Last 24 Hrs  T(C): 36.7 (10-24-22 @ 21:27), Max: 37.1 (10-24-22 @ 13:21)  T(F): 98 (10-24-22 @ 21:27), Max: 98.7 (10-24-22 @ 13:21)  HR: 62 (10-24-22 @ 21:27) (62 - 74)  BP: 123/56 (10-24-22 @ 21:27) (115/75 - 124/66)  RR: 18 (10-24-22 @ 21:27) (17 - 18)  SpO2: 100% (10-24-22 @ 21:27) (100% - 100%) on (O2)    PHYSICAL EXAM:  Constitutional: laying in bed, appears tired   Ears, nose, Mouth, and Throat: normal external ears and nose, normal hearing, moist oral mucosa  Eyes: normal conjunctiva, EOMI, PEERL  Neck: supple, no JVD  Respiratory: Clear to auscultation bilaterally. No wheezes, rales or rhonchi. Normal respiratory effort  Cardiovascular: regular rate and rhythm, S1 and S2, no murmurs, rubs or gallops, no edema, 2+ peripheral pulses  Gastrointestinal: soft, nontender, nondistended, +bowel sounds, no hernia  Skin: warm, dry, no rash  Neurologic: sensation grossly intact, CN grossly intact, non-focal exam  Musculoskeletal: no clubbing, no cyanosis, no joint swelling  Psychiatric: A&Ox3, appropriate mood, affect    I reviewed the above labs, radiology, medications, tests, telemetry, and EKG interpretation.    ASSESSMENT/PLAN:  -Patient complaining of chest pain, rib pain and lower back pain. Chest pain shortly after taking morphine. Recurrent chest pain.   -EKG sinus bradycardia  -Cardiac enzymes, VBG,CBC,BMP ordered   -Chest X-ray ordered  -Patient refusing medications at this time   -Warm packs used for comfort   -Pain management to be considered to assist with further pain control   -Will continue to monitor   -Overnight events to be conveyed to day team to ensure continuity of care     Low complexity/risk (30min) [facility] MEDICINE COVERAGE - Medicine Subsequent Hospital Care Note      HPI/Subjective: 56 year old female with PMHx of Asthma, Anemia, Bladder cancer with 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   Contacted by RN due to patient complaining of left sided rib and chest pain. No tenderness to palpation. Admits to chronic back pain and transient SOB( she is unsure if due to wearing face mask). Denies palpitation, headache, nausea or gas pain. Of note patient states chest pain begins shortly after Morphine given. She experienced a similar episode earlier yesterday. Chest and back pain have occurred transiently during hospitalization. Patient admits she does not like take medications as she only took one Tylenol tablet at home.     Vital Signs Last 24 Hrs  T(C): 36.7 (10-24-22 @ 21:27), Max: 37.1 (10-24-22 @ 13:21)  T(F): 98 (10-24-22 @ 21:27), Max: 98.7 (10-24-22 @ 13:21)  HR: 62 (10-24-22 @ 21:27) (62 - 74)  BP: 123/56 (10-24-22 @ 21:27) (115/75 - 124/66)  RR: 18 (10-24-22 @ 21:27) (17 - 18)  SpO2: 100% (10-24-22 @ 21:27) (100% - 100%) on (O2)    PHYSICAL EXAM:  Constitutional: laying in bed, appears tired   Ears, nose, Mouth, and Throat: normal external ears and nose, normal hearing, moist oral mucosa  Eyes: normal conjunctiva, EOMI, PEERL  Neck: supple, no JVD  Respiratory: Clear to auscultation bilaterally. No wheezes, rales or rhonchi. Normal respiratory effort  Cardiovascular: regular rate and rhythm, S1 and S2, no murmurs, rubs or gallops, no edema, 2+ peripheral pulses  Gastrointestinal: soft, nontender, nondistended, +bowel sounds, no hernia  Skin: warm, dry, no rash  Neurologic: sensation grossly intact, CN grossly intact, non-focal exam  Musculoskeletal: no clubbing, no cyanosis, no joint swelling  Psychiatric: A&Ox3, appropriate mood, affect    I reviewed the above labs, radiology, medications, tests, telemetry, and EKG interpretation.    ASSESSMENT/PLAN:  -Patient complaining of chest pain, rib pain and lower back pain. Chest pain occurs shortly after taking morphine.   -Discussed Morphine side effects with pharmacy who stated rare side effect of morphine is chest pain  -EKG sinus bradycardia  -Cardiac enzymes, VBG,CBC,BMP ordered   -Chest X-ray ordered  -Patient refusing medications at this time   -Warm packs used for comfort   -Pain management to be considered to assist with further pain control   -Will continue to monitor   -Overnight events to be conveyed to day team to ensure continuity of care     Low complexity/risk (30min) [facility] MEDICINE COVERAGE - Medicine Subsequent Hospital Care Note      HPI/Subjective: 56 year old female with PMHx of Asthma, Anemia, Bladder cancer with 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   Contacted by RN due to patient complaining of left sided rib and chest pain. No tenderness to palpation. Admits to chronic back pain and transient SOB( she is unsure if due to wearing face mask). Denies palpitation, headache, nausea or gas pain. Of note patient states chest pain begins shortly after Morphine given. She experienced a similar episode earlier yesterday. Chest and back pain have occurred transiently during hospitalization. Patient admits she does not like take medications as she only took one Tylenol tablet at home.     Vital Signs Last 24 Hrs  T(C): 36.7 (10-24-22 @ 21:27), Max: 37.1 (10-24-22 @ 13:21)  T(F): 98 (10-24-22 @ 21:27), Max: 98.7 (10-24-22 @ 13:21)  HR: 62 (10-24-22 @ 21:27) (62 - 74)  BP: 123/56 (10-24-22 @ 21:27) (115/75 - 124/66)  RR: 18 (10-24-22 @ 21:27) (17 - 18)  SpO2: 100% (10-24-22 @ 21:27) (100% - 100%) on (O2)    PHYSICAL EXAM:  Constitutional: laying in bed, appears tired   Ears, nose, Mouth, and Throat: normal external ears and nose, normal hearing, moist oral mucosa  Eyes: normal conjunctiva, EOMI, PEERL  Neck: supple, no JVD  Respiratory: Clear to auscultation bilaterally. No wheezes, rales or rhonchi. Normal respiratory effort  Cardiovascular: regular rate and rhythm, S1 and S2, no murmurs, rubs or gallops, no edema, 2+ peripheral pulses  Gastrointestinal: soft, nontender, nondistended, +bowel sounds, no hernia  Skin: warm, dry, no rash  Neurologic: sensation grossly intact, CN grossly intact, non-focal exam  Musculoskeletal: no clubbing, no cyanosis, no joint swelling  Psychiatric: A&Ox3, appropriate mood, affect    I reviewed the above labs, radiology, medications, tests, telemetry, and EKG interpretation.    ASSESSMENT/PLAN:  -Patient complaining of chest pain, rib pain and lower back pain. Chest pain occurs shortly after taking morphine.   -Discussed Morphine side effects with pharmacy who stated rare side effect of morphine is chest pain\  -VSS   -EKG sinus bradycardia  -Cardiac enzymes, VBG, CBC, BMP ordered   -Chest X-ray ordered  -Patient refusing medications at this time   -Warm packs used for comfort   -Pain management to be considered to assist with further pain control   -Will continue to monitor   -Overnight events to be conveyed to day team to ensure continuity of care     Low complexity/risk (30min)

## 2022-10-25 NOTE — PROGRESS NOTE ADULT - ASSESSMENT
( Note written / Date of service 10-25-22 )    ==================>> MEDICATIONS <<====================    acetaminophen     Tablet .. 650 milliGRAM(s) Oral every 6 hours  ascorbic acid 500 milliGRAM(s) Oral daily  chlorhexidine 2% Cloths 1 Application(s) Topical <User Schedule>  cholecalciferol 2000 Unit(s) Oral daily  enoxaparin Injectable 40 milliGRAM(s) SubCutaneous every 24 hours  ferrous    sulfate 325 milliGRAM(s) Oral daily  folic acid 1 milliGRAM(s) Oral daily  influenza   Vaccine 0.5 milliLiter(s) IntraMuscular once  lidocaine   4% Patch 1 Patch Transdermal every 24 hours  morphine  - Injectable 2 milliGRAM(s) IV Push every 4 hours  pantoprazole    Tablet 40 milliGRAM(s) Oral before breakfast  saline laxative (FLEET) Rectal Enema 1 Enema Rectal once  senna 2 Tablet(s) Oral at bedtime    MEDICATIONS  (PRN):  aluminum hydroxide/magnesium hydroxide/simethicone Suspension 30 milliLiter(s) Oral every 4 hours PRN Dyspepsia  bisacodyl 5 milliGRAM(s) Oral daily PRN Constipation  melatonin 3 milliGRAM(s) Oral at bedtime PRN Insomnia  morphine  - Injectable 1 milliGRAM(s) IV Push every 2 hours PRN breakthrough pain  ondansetron Injectable 4 milliGRAM(s) IV Push every 8 hours PRN Nausea and/or Vomiting  simethicone 80 milliGRAM(s) Chew four times a day PRN Gas    ___________  Active diet:  Diet, Regular:   High Fiber (HIFIBER)  Supplement Feeding Modality:  Oral  Ensure Plus High Protein Cans or Servings Per Day:  2       Frequency:  Daily  ___________________    ==================>> VITAL SIGNS <<==================    Vital Signs Last 24 HrsT(C): 37.1 (10-25-22 @ 13:50)  T(F): 98.7 (10-25-22 @ 13:50), Max: 98.7 (10-25-22 @ 13:50)  HR: 86 (10-25-22 @ 13:50) (60 - 86)  BP: 122/69 (10-25-22 @ 13:50)  RR: 18 (10-25-22 @ 13:50) (17 - 18)  SpO2: 100% (10-25-22 @ 13:50) (100% - 100%)      CAPILLARY BLOOD GLUCOSE         ==================>> LAB AND IMAGING <<==================                        8.2    8.09  )-----------( 379      ( 25 Oct 2022 03:58 )             28.6        10-25    136  |  98  |  20  ----------------------------<  93  4.3   |  25  |  0.95    Ca    10.2      25 Oct 2022 03:58  Phos  3.6     10-25  Mg     2.10     10-25      WBC count:   8.09 <<== ,  9.02 <<== ,  8.22 <<== ,  7.96 <<==   Hemoglobin:   8.2 <<==,  8.0 <<==,  7.9 <<==,  8.7 <<==  platelets:  379 <==, 319 <==, 352 <==, 366 <==, 399 <==    Creatinine:  0.95  <<==, 0.89  <<==, 0.93  <<==, 0.97  <<==, 0.94  <<==  Sodium:   136  <==, 134  <==, 136  <==, 137  <==, 136  <==       AST:          13 <==      ALT:        8  <==      AP:        96  <=     Bili:        <0.2  <=    ____________________________    M I C R O B I O L O G Y :    Culture - Urine (collected 18 Oct 2022 20:50)  Source: Clean Catch Clean Catch (Midstream)  Final Report (21 Oct 2022 21:45):    10,000 - 49,000 CFU/mL Enterococcus faecalis  Organism: Enterococcus faecalis (21 Oct 2022 21:45)  Organism: Enterococcus faecalis (21 Oct 2022 21:45)    Sensitivities:      -  Ampicillin: S <=2 Predicts results to ampicillin/sulbactam, amoxacillin-clavulanate and  piperacillin-tazobactam.      -  Ciprofloxacin: S <=1      -  Levofloxacin: S <=1      -  Nitrofurantoin: S <=32 Should not be used to treat pyelonephritis.      -  Tetra/Doxy: R >8      -  Vancomycin: S 2      Method Type: PATRICIA        SARS-CoV-2: NotDetec (10-17-22 @ 21:31)  COVID-19 PCR: NotDetec (10-14-22 @ 06:00)  COVID-19 PCR: Detected (10-12-22 @ 06:51)     _________________________________________________________________________________________  ========>>  M E D I C A L   A T T E N D I N G    F O L L O W  U P  N O T E  <<=========  -----------------------------------------------------------------------------------------------------    - Patient seen and examined by me earlier today.   - In summary,  JEF HOUSE is a 56y year old woman admitted with back / flank pain  - Patient today overall not doing well, had small , hard BM ( but has not been compliant with all bowel regimen )      pt very picky and concerned about medications for pain and even bowel regimen , not taking most meds recommended        appreciated palliative discussion and med mgmt    ==================>> REVIEW OF SYSTEM <<=================    GEN: no fever, no chills, on and off pain as above   RESP: no SOB, no cough, no sputum, no more hemoptysis reported   CVS: ++ chest / upper abd pain, no palpitations, no edema  GI: abdominal pain, no nausea, no constipation, no diarrhea  : no dysuria, no frequency, no hematuria in tube or voiding      + occasional chronic vaginal bleeding   Neuro: no headache, no dizziness  Derm : no itching, no rash    ==================>> PHYSICAL EXAM <<=================    GEN: A&O X 3 , NAD , uncomfortable, pleasant, calm , cachectic , pt encouraged to take meds as needed for comfort   HEENT: NCAT, PERRL, MMM, hearing intact  Neck: supple , no JVD appreciated  CVS: S1S2 , regular , No M/R/G appreciated  PULM: CTA B/L,  no W/R/R appreciated  ABD.: soft. non tender, non distended  Extrem: intact pulses , no edema     nephrostomy in place and draining to bag   PSYCH : normal mood,  not anxious                             ( Note written / Date of service 10-25-22 )    ==================>> MEDICATIONS <<====================    acetaminophen     Tablet .. 650 milliGRAM(s) Oral every 6 hours  ascorbic acid 500 milliGRAM(s) Oral daily  chlorhexidine 2% Cloths 1 Application(s) Topical <User Schedule>  cholecalciferol 2000 Unit(s) Oral daily  enoxaparin Injectable 40 milliGRAM(s) SubCutaneous every 24 hours  ferrous    sulfate 325 milliGRAM(s) Oral daily  folic acid 1 milliGRAM(s) Oral daily  influenza   Vaccine 0.5 milliLiter(s) IntraMuscular once  lidocaine   4% Patch 1 Patch Transdermal every 24 hours  morphine  - Injectable 2 milliGRAM(s) IV Push every 4 hours  pantoprazole    Tablet 40 milliGRAM(s) Oral before breakfast  saline laxative (FLEET) Rectal Enema 1 Enema Rectal once  senna 2 Tablet(s) Oral at bedtime    MEDICATIONS  (PRN):  aluminum hydroxide/magnesium hydroxide/simethicone Suspension 30 milliLiter(s) Oral every 4 hours PRN Dyspepsia  bisacodyl 5 milliGRAM(s) Oral daily PRN Constipation  melatonin 3 milliGRAM(s) Oral at bedtime PRN Insomnia  morphine  - Injectable 1 milliGRAM(s) IV Push every 2 hours PRN breakthrough pain  ondansetron Injectable 4 milliGRAM(s) IV Push every 8 hours PRN Nausea and/or Vomiting  simethicone 80 milliGRAM(s) Chew four times a day PRN Gas    ___________  Active diet:  Diet, Regular:   High Fiber (HIFIBER)  Supplement Feeding Modality:  Oral  Ensure Plus High Protein Cans or Servings Per Day:  2       Frequency:  Daily  ___________________    ==================>> VITAL SIGNS <<==================    Vital Signs Last 24 HrsT(C): 37.1 (10-25-22 @ 13:50)  T(F): 98.7 (10-25-22 @ 13:50), Max: 98.7 (10-25-22 @ 13:50)  HR: 86 (10-25-22 @ 13:50) (60 - 86)  BP: 122/69 (10-25-22 @ 13:50)  RR: 18 (10-25-22 @ 13:50) (17 - 18)  SpO2: 100% (10-25-22 @ 13:50) (100% - 100%)       ==================>> LAB AND IMAGING <<==================                        8.2    8.09  )-----------( 379      ( 25 Oct 2022 03:58 )             28.6        10-25    136  |  98  |  20  ----------------------------<  93  4.3   |  25  |  0.95    Ca    10.2      25 Oct 2022 03:58  Phos  3.6     10-25  Mg     2.10     10-25    WBC count:   8.09 <<== ,  9.02 <<== ,  8.22 <<== ,  7.96 <<==   Hemoglobin:   8.2 <<==,  8.0 <<==,  7.9 <<==,  8.7 <<==  platelets:  379 <==, 319 <==, 352 <==, 366 <==, 399 <==    Creatinine:  0.95  <<==, 0.89  <<==, 0.93  <<==, 0.97  <<==, 0.94  <<==  Sodium:   136  <==, 134  <==, 136  <==, 137  <==, 136  <==      ____________________________    M I C R O B I O L O G Y :    Culture - Urine (collected 18 Oct 2022 20:50)  Source: Clean Catch Clean Catch (Midstream)  Final Report (21 Oct 2022 21:45):    10,000 - 49,000 CFU/mL Enterococcus faecalis  Organism: Enterococcus faecalis (21 Oct 2022 21:45)  Organism: Enterococcus faecalis (21 Oct 2022 21:45)    Sensitivities:      -  Ampicillin: S <=2 Predicts results to ampicillin/sulbactam, amoxacillin-clavulanate and  piperacillin-tazobactam.      -  Ciprofloxacin: S <=1      -  Levofloxacin: S <=1      -  Nitrofurantoin: S <=32 Should not be used to treat pyelonephritis.      -  Tetra/Doxy: R >8      -  Vancomycin: S 2      Method Type: PATRICIA      SARS-CoV-2: NotDetec (10-17-22 @ 21:31)  COVID-19 PCR: NotDetec (10-14-22 @ 06:00)  COVID-19 PCR: Detected (10-12-22 @ 06:51)    ___________________________________________________________________________________  ===============>>  A S S E S S M E N T   A N D   P L A N <<===============  ------------------------------------------------------------------------------------------    · Assessment	  56 year old female with PMHx of Asthma, Anemia, Bladder cancer with 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     Problem/Plan - 1:  ·  Problem: low back / flank and chest pain.   ·  Plan: acute on chronic, involving both hips, with left worse than right. Affecting mobility. Not controlled by Tramadol and Tylenol.   CT scan of abdomen and pelvis: no evidence of osseous metastatic disease.  discussion as above       chest pain after taking morphine is not typical.. pain is not cardiac: likely from gas as pt with constipation >> bowel regimen given ... ) :  bowel regimen and simethiocone as ordered : encouraged   increase ambulation  as able   palliative follow up and mgmt  appreciated   bowel regimen  anti gas meds reordered    Problem/Plan - 2:  ·  Problem: Hemoptysis. on and off, small amounts   CT demonstrates: Numerous bilateral pulmonary metastases; mild interval increase in size of a few of the largest pulmonary metastases compared to 9/20/2022.  - Monitor for now  - pt planned for OP folowup with oncology for treatment / chemo vs immunotherapy   -  offered to have V/Q done but pt also declined stating she had it done in recent past and was negative.. ( done in May according to our records)     Problem/Plan - 3:  ·  Problem: Hypoglycemia.   ·  Plan: glucose to 59 via FS; non-diabetic, s/p D-50 in the ED  diet prescribed, along with supplements  - Monitor fingersticks.    Problem/Plan - 4:  ·  Problem: Primary cancer of bladder with metastasis to other site.   ·  Plan: with metastases and has left hydronephrosis  s/p right nephrostomy tube in place draining w/o difficulty  - Oncology consult / follow up ( pt states she will be changing oncologists from [facility], Dr Jones, to [facility])     occasional vaginal bleeding      pt declined TVUS and GYN exam before        will follow up with own GYN as OP    Problem/Plan - 5:  ·  Problem: Slow transit constipation.   ·  Plan: no bowel movement for the past 3 days while on opiate  - Bowel regimen as ordered   - Monitor BM.    Problem/Plan - 6:  ·  Problem: Protein calorie malnutrition.   ·  Plan: Poor PO intake, confused/frustrated as to what to eat at present. no longer taking MVI; will restart  - Nutrition consult     Problem/Plan - 7:  ·  Problem: Prophylactic measure.   ·  Plan: DVT ppx: will do SCD for now, will switch to Lovenox  no PT needs.    discharge planing when pain better controlled     --------------------------------------------  Case discussed with pt, NP, RN  Education given on findings and plan of care  ___________________________  H. NIEVES Ochoa.  Pager: [phone]

## 2022-10-25 NOTE — DISCHARGE NOTE PROVIDER - EXTENDED VTE YES NO FOR MLM ENOXAPARIN
, [address] [facility] AT [address] [place], [place] [address] [address] [facility], [place] [address]  Phone: [phone] Fax: [phone] / STATEMENT OF MEDICAL NECESSITY FOR PHYSICAL THERAPY SERVICES  Patient Name: Jun Miner : 1968   Medical   Diagnosis: Other low back pain [M54.59] Treatment Diagnosis: LBP with radiculopathy   Onset Date: ~2 years ago, worsening in past year     Referral Source: Lucero Wooten MD Start of Care [facility]): 10/25/2022   Prior Hospitalization: See medical history Provider #: [id number]   Prior Level of Function: Independent, Full-time employed    Comorbidities: DMII, Underactive thyroid   Medications: Verified on Patient Summary List   The Plan of Care and following information is based on the information from the initial evaluation.   ========================================================================  Assessment / key information:  Pt is a 49 yo male presenting with LBP with radiculopathy in the sciatic nerve distribution B. MRI taken 22 reveals \"Herniations at L4-L5 L5-S1 with mild mass effect on right and left S1 nerve roots\". Pain ranges from 4-10/10, worse with standing, sitting, and walking, improved with laying down and medicine. Pain is described as sharp in B buttocks into posterior thighs and sometimes down to feet. Pt is (+) for supine and prone dural mobility and notes relief with lumbar extension. Pt denies bowel/bladder problems or abdominal pain, LE reflexes are normal. True hip/LE strength and flexibility difficult to measure given acuteness of pain however are globally decr. Functional deficits at this time include \"everything\" including ability to perform work duties as . Pt would benefit from skilled PT to address these deficits to assist pt to full return to PLOF activity. HEP initiated with good pt tolerance noted.  Further assessment as follows:    Posture:  (-) lateral shift    Active Movements: ROM % AROM Comments:pain, area   Forward flexion  To knees Pain at R HS    Extension 50 Pain at R buttock   SB R  To knee  Pain at R buttock   SB L  To knee    Rotation R  75    Rotation L  75       ROM / Strength:                     Strength (1-5)    Left Right   Hip Flexion 4+ 5    Extension 4- 4-    Abduction 3+ 3+   Knee Flexion 5 4 p! Extension 5 4 p! Ankle Plantarflexion      Dorsiflexion 5 4+     Hip, Knee AROM: WNL B    Repeated Movements:   Extension: Decr  Flexion: Incr     Neuro Screen:   Intact to Light touch L3-S1 B  Reflexes:  L4: 2+ B  S1: 2+ B    Dural Mobility:  SLR Supine: ( +)  B  Slump Test: (- ) B  Prone Knee Bend: (+ )  B    Palpation:  TTP at B glutes, l/s paraspinals  Iliac crests: Even height B        Flexibility:   Gastoc:  Mod limitation B  Hamstring 90/90: Mod limitation B  Piriformis: (+) B    Balance:  SLS: R: unable;  L: 20 sec    Other Tests/Comments:  TA draw: good  5xSTS: 20 seconds, use of B UE on plinth, painful  FOTO: NT    ========================================================================  Eval Complexity: History: MEDIUM  Complexity : 1-2 comorbidities / personal factors will impact the outcome/ POC Exam:HIGH Complexity : 4+ Standardized tests and measures addressing body structure, function, activity limitation and / or participation in recreation  Presentation: MEDIUM Complexity : Evolving with changing characteristics  Clinical Decision Making:Other outcome measures 5xSTS  MEDIUMOverall Complexity:MEDIUM  Problem List: pain affecting function, decrease ROM, decrease strength, impaired gait/ balance, decrease ADL/ functional abilitiies, decrease activity tolerance, decrease flexibility/ joint mobility, and decrease transfer abilities     Treatment Plan may include any combination of the following: Therapeutic exercise, Neuromuscular reeducation, Manual therapy, Therapeutic activity, Self care/home management, Electric stim unattended , Gait training, Ultrasound, Mechanical traction, and Electric stim attended  Patient / Family readiness to learn indicated by: trying to perform skills and interest  Persons(s) to be included in education: patient (P)  Barriers to Learning/Limitations: None  Measures taken:    Patient Goal (s): \"relief\"   Patient self reported health status: good  Rehabilitation Potential: good    Short Term Goals: To be accomplished in  1  week:   1. Pt will be compliant with initial HEP to improve ability to independently manage symptoms. Status at last assessment: reviewed and initiated HEP    Long Term Goals: To be accomplished in  4  weeks:  1. Pt to report FOTO score of predicted score to demonstrate improved function and quality of life. Status at last assessment: NT  2. Pt to demo (-) dural mobility tests for improvement of sciatic pain sx and improved QoL. Status at last assessment: (+) SLR supine and prone knee bend B   3. Pt to report pain levels or no greater than 3/10 after a work day in order to indicate understanding of self management strategies for pain and ease with performing work duties  Status at last assessment: Pain ranges from 4-10/10, worse with standing, sitting, and walking, improved with laying down and medicine. 4. Pt to demo B hip ext and abd strength of at least 4+/5 for improved pelvic stability for ease with transfers, stairs, and lifting  Status at last assessment: B hip ext 4-/5, hip abd 3+/5      Frequency / Duration:   Patient to be seen  1-2  times per week for 4-8  treatments:     Patient / Caregiver education and instruction: self care, activity modification, and exercises  Therapist Signature: Faby Medley PT Date: 33/83/5546   Certification Period: na Time: 7:49 AM   ========================================================================  I certify that the above Physical Therapy Services are being furnished while the patient is under my care.   I agree with the treatment plan and certify that this therapy is necessary. Physician Signature:        Date:       Time: ___                                            Nu Navarro MD.    Please sign and return to [facility] or you may fax the signed copy to [phone]. Thank you.

## 2022-10-26 NOTE — CONSULT NOTE ADULT - SUBJECTIVE AND OBJECTIVE BOX
JEF HOUSE  56y  Female [id number]    HPI: 56 year old P5, postmenopausal woman ( LMP approximately 2019) who was admitted on 10/17 with hemoptysis, SOB and 10/10 pain in hips and back in the setting of metastatic bladder cancer. Patient has been admitted to the medical team for management. Patient being followed by palliative care and medical oncology. Patient was diagnosed with bladder cancer in  , now with metastasis to lungs and liver.  Patient was originally following with  Dr. Jones ( [facility]) initially with plans to transfer care to [facility]. Medical Oncology assessed the patient and recommended outpatient eval for chemo/ immunotherapy. GYN consulted as the patient started to have vaginal bleeding. As per patient, she has been having intermittent vaginal bleeding for the past 3 weeks. She notices that the vaginal bleeding occurs more often when she is wiping/ when her she has the urge to urinate. The bleeding increased yesterday. Patient states that after she went to the bathroom she noticed that she had bright red bleeding on the toilet paper and then passed a few stringy clots in the toilet. She denies any abdominal cramping, nausea, vomiting, passage of large clots . This is the heaviest bleeding patient has had since the bleeding started. Has not had any other episodes of vaginal bleeding after menopause. Overall patient feels weak  and has alot of pain in her back and hips that are not being controlled with the Tylenol/ Morphine as much as she had expected. Patient also concerned about the amount of weight loss she has had since the diagnosis    Name of GYN Physician: Blas Lua MD and [facility] Midwife ( unknown name )    POB:  x 5  ,,,,     Pgyn: Hx of fibroids and Right ovarian cyst, Denies STI's, Abnormal pap smears   PMH:Asthma, Anemia, Metastatic Bladder cancer to lungs and the liver, HTN, Dyslipidemia, Hydronephrosis with placement of neph tube, Parathyroid Tumor, pacemaker   PSH: LSC myomectomy in , Pacemeaker placement for bradycardia in , Right neph tube placement for hydronephrosis in , with exchange q 3 months. Next exchange in  as per patient    HCM: Last pap 2017 wnl, Colonscopy w/in 5 years wn       Hospital Meds:   MEDICATIONS  (STANDING):  acetaminophen     Tablet .. 650 milliGRAM(s) Oral every 6 hours  ascorbic acid 500 milliGRAM(s) Oral daily  chlorhexidine 2% Cloths 1 Application(s) Topical <User Schedule>  cholecalciferol 2000 Unit(s) Oral daily  ferrous    sulfate 325 milliGRAM(s) Oral daily  folic acid 1 milliGRAM(s) Oral daily  influenza   Vaccine 0.5 milliLiter(s) IntraMuscular once  lidocaine   4% Patch 1 Patch Transdermal every 24 hours  morphine   Solution 5 milliGRAM(s) Oral every 4 hours  pantoprazole    Tablet 40 milliGRAM(s) Oral before breakfast  saline laxative (FLEET) Rectal Enema 1 Enema Rectal once  senna 2 Tablet(s) Oral at bedtime    MEDICATIONS  (PRN):  aluminum hydroxide/magnesium hydroxide/simethicone Suspension 30 milliLiter(s) Oral every 4 hours PRN Dyspepsia  bisacodyl 5 milliGRAM(s) Oral daily PRN Constipation  melatonin 3 milliGRAM(s) Oral at bedtime PRN Insomnia  ondansetron Injectable 4 milliGRAM(s) IV Push every 8 hours PRN Nausea and/or Vomiting  simethicone 80 milliGRAM(s) Chew four times a day PRN Gas      Social History:  Denies smoking use, drug use, alcohol use.     Vital Signs Last 24 Hrs  T(C): 36.6 (26 Oct 2022 13:34), Max: 37.1 (25 Oct 2022 13:50)  T(F): 97.9 (26 Oct 2022 13:34), Max: 98.7 (25 Oct 2022 13:50)  HR: 74 (26 Oct 2022 13:34) (65 - 86)  BP: 110/65 (26 Oct 2022 13:34) (102/51 - 122/69)  BP(mean): --  RR: 18 (26 Oct 2022 05:37) (18 - 18)  SpO2: 99% (26 Oct 2022 13:34) (99% - 100%)    Parameters below as of 26 Oct 2022 13:34  Patient On (Oxygen Delivery Method): room air        Physical Exam:   General: patient sitting up uncomfortable in bed, holding her back , cachetic appearing   CV: RR, S1S2 present, no m/r/g  Lungs: CTA b/l  Back: No CVA tenderness  Abd: Soft, non-tender,palpable mass appreciated at 14 weeks    :  Streak of blood noted at the urethra. No bleeding on pad.    External labia wnl. Cervix not palpable secondary to an anterior firm mass appreciated in the vaginal canal extending approximately 4 cm deep into the vagina. Can be appreciated 3 cm into the vaginal canal. Mass palpated and at that time bleeding started  Speculum Exam: Could not pass underneath the mass     Ext: non-tender b/l, no edema   Smooth rectal vaginal vault     LABS:                            7.7    7.53  )-----------( 343      ( 26 Oct 2022 07:00 )             25.8     10-26    134<L>  |  98  |  25<H>  ----------------------------<  91  3.9   |  23  |  0.91    Ca    10.0      26 Oct 2022 00:36  Phos  3.5     10-  Mg     2.00     10-26      I&O's Detail    25 Oct 2022 07:01  -  26 Oct 2022 07:00  --------------------------------------------------------  IN:  Total IN: 0 mL    OUT:    Nephrostomy Tube (mL): 400 mL  Total OUT: 400 mL    Total NET: -400 mL              RADIOLOGY & ADDITIONAL STUDIES:  CT: ACC: [id number] EXAM:  CT ABDOMEN AND PELVIS                        ACC: [id number] EXAM:  CT CHEST                          PROCEDURE DATE:  2022          INTERPRETATION:  CLINICAL INFORMATION: Chest pain. Right nephrostomy   tube, hematuria.    COMPARISON: CT chest, abdomen and pelvis 2022, abdominal sonogram   2022    CONTRAST/COMPLICATIONS:  IV Contrast: None  Oral Contrast: None  Complications: None    PROCEDURE:  CT of the Chest, Abdomen and Pelvis was performed without intravenous   contrast.  Intravenous contrast: None.  Oral contrast: positive contrast was administered.  Sagittal and coronal reformats were performed.    FINDINGS:    CHEST:    LUNGS AND LARGE AIRWAYS: Patent central airways. Bilateral pulmonary   noduleshave again increased in size and number since the recent prior   chest CT. For example, left lower lobe nodule now measures 2.8 x 2.0 cm   and has cavitation (prior 2.5 x 1.9 cm). A right lower lobe nodule now   measures 2.5 x 2.0 cm (prior 2.0 x 1.8cm) with interval resolution of   cavitation within this nodule. Adjacent nodule now appears cavitary.  PLEURA: No pleural effusion. No pneumothorax.  VESSELS: Within normal limits.  HEART: Heart size is normal. Trace pericardial effusion. Distal leads of   cardiac device overlying the right atrium and ventricle, unchanged.  MEDIASTINUM AND FLORENTIN: No lymphadenopathy.  CHEST WALL AND LOWER NECK: Left chest wall dual-lead cardiac device.    ABDOMEN AND PELVIS:    LIVER: 2.4 cm hypodense lesion in the right hepatic lobe not   significantly changed from the prior exam. There are several new   hypodense lesions within the left and right hepatic lobes as for example   the left hepatic lobe on series 4 image 114 measures 2 cm and in the   right hepatic lobe 0.9 cm on series 4 image 134.  BILE DUCTS: Normal caliber.  GALLBLADDER: Within normal limits.  SPLEEN: Within normal limits.  PANCREAS: Within normal limits.  ADRENALS: Within normal limits.  KIDNEYS/URETERS: Right nephrostomy tube in place. No hydronephrosis or   perinephric stranding on the right. Severe left hydronephrosis and   scattered calcifications, unchanged. Minimal left perinephric stranding   similar to the prior exam.    BLADDER: Marked bladder wall thickening similar to the prior exam.  REPRODUCTIVE ORGANS: Uterus and adnexal structures similar to prior exam.    BOWEL: No bowel obstruction. Appendix is normal. No bowel wall thickening   or pericolonic inflammatory change. Underdistended stomach which limits   evaluation of the wall thickness.  PERITONEUM: No ascites. No free air.  VESSELS:  Within normal limits.  RETROPERITONEUM: Retroperitoneal adenopathy is not significantly changed.   Left periaortic lymph node measures 2.5 cm  ABDOMINAL WALL: Within normal limits.  BONES: No aggressive osteoblastic or lytic lesion.    IMPRESSION:  Chest CT: Interval progression of cavitary pulmonary metastatic disease   with increased size and number of pulmonary nodules. No evidence of   pneumonia.    CT abdomen/pelvis: Right nephrostomy tube in place without   hydronephrosis. Stable superior left hydronephrosis. Marked irregular   bladder wall thickening, unchanged.  Progression of liver metastasis. Stable retroperitoneal adenopathy.    --- End of Report ---            SARAI DUGGAN MD; Attending Radiologist  This document has been electronically signed. Sep 20 2022  3:17AM   [facility]SE  56y  Female [id number]    HPI: 56 year old P5, postmenopausal woman ( LMP approximately 2019) who was admitted on 10/17 with hemoptysis, SOB and 10/10 pain in hips and back in the setting of metastatic transitional cell  bladder carcinoma. Patient has been admitted to the medical team for management. Patient being followed by palliative care and medical oncology. Patient was diagnosed with bladder cancer in  , now with metastasis to lungs and liver.  Patient was originally following with  Dr. Jones ( [facility]) initially with plans to transfer care to [facility]. Medical Oncology assessed the patient and recommended outpatient eval for chemo/ immunotherapy. GYN consulted as the patient started to have vaginal bleeding. As per patient, she has been having intermittent vaginal bleeding for the past 3 weeks. She notices that the vaginal bleeding occurs more often when she is wiping/ when her she has the urge to urinate. The bleeding increased yesterday. Patient states that after she went to the bathroom she noticed that she had bright red bleeding on the toilet paper and then passed a few stringy clots in the toilet. She denies any abdominal cramping, nausea, vomiting, passage of large clots . This is the heaviest bleeding patient has had since the bleeding started. Has not had any other episodes of vaginal bleeding after menopause. Overall patient feels weak  and has alot of pain in her back and hips that are not being controlled with the Tylenol/ Morphine as much as she had expected. Patient also concerned about the amount of weight loss she has had since the diagnosis     As per chart review: Patient was seen by GYN ONC Dr. Guerita Perez in 2018 for fibroid and complex ovarian cyst that resolved on re-imaging. Patient has also been following with Urology since 2019, last chemotherapy as in May 2022 with 3 [place] [facility] [place] was scheduled for 12 cycles but did not complete. As per patient last chemo was approximately in May 2022. Patient was offered cystectomy however declined surgery.     Name of GYN Physician: Blas Lua MD and [facility] Midwife ( unknown name ) previously Francisca Austin MD     POB:  x 5  ,,,,     Pgyn: Hx of fibroids and Right ovarian cyst, Denies STI's, Abnormal pap smears   PMH:Asthma, Anemia, Metastatic Bladder cancer to lungs and the liver, HTN, Dyslipidemia, Hydronephrosis with placement of neph tube, Parathyroid Tumor, pacemaker   PSH: LSC myomectomy in , Pacemaker placement for bradycardia in , Right neph tube placement for hydronephrosis in , with exchange q 3 months. Next exchange in  as per patient    HCM: Last pap 2017 wnl, Colonoscopy w/in 5 years \[facility]\"" Meds:   MEDICATIONS  (STANDING):  acetaminophen     Tablet .. 650 milliGRAM(s) Oral every 6 hours  ascorbic acid 500 milliGRAM(s) Oral daily  chlorhexidine 2% Cloths 1 Application(s) Topical <User Schedule>  cholecalciferol 2000 Unit(s) Oral daily  ferrous    sulfate 325 milliGRAM(s) Oral daily  folic acid 1 milliGRAM(s) Oral daily  influenza   Vaccine 0.5 milliLiter(s) IntraMuscular once  lidocaine   4% Patch 1 Patch Transdermal every 24 hours  morphine   Solution 5 milliGRAM(s) Oral every 4 hours  pantoprazole    Tablet 40 milliGRAM(s) Oral before breakfast  saline laxative (FLEET) Rectal Enema 1 Enema Rectal once  senna 2 Tablet(s) Oral at bedtime    MEDICATIONS  (PRN):  aluminum hydroxide/magnesium hydroxide/simethicone Suspension 30 milliLiter(s) Oral every 4 hours PRN Dyspepsia  bisacodyl 5 milliGRAM(s) Oral daily PRN Constipation  melatonin 3 milliGRAM(s) Oral at bedtime PRN Insomnia  ondansetron Injectable 4 milliGRAM(s) IV Push every 8 hours PRN Nausea and/or Vomiting  simethicone 80 milliGRAM(s) Chew four times a day PRN Gas      Social History:  Denies smoking use, drug use, alcohol use.     Vital Signs Last 24 Hrs  T(C): 36.6 (26 Oct 2022 13:34), Max: 37.1 (25 Oct 2022 13:50)  T(F): 97.9 (26 Oct 2022 13:34), Max: 98.7 (25 Oct 2022 13:50)  HR: 74 (26 Oct 2022 13:34) (65 - 86)  BP: 110/65 (26 Oct 2022 13:34) (102/51 - 122/69)  BP(mean): --  RR: 18 (26 Oct 2022 05:37) (18 - 18)  SpO2: 99% (26 Oct 2022 13:34) (99% - 100%)    Parameters below as of 26 Oct 2022 13:34  Patient On (Oxygen Delivery Method): room air        Physical Exam:   General: patient sitting up uncomfortable in bed, holding her back , cachetic appearing   CV: RR, S1S2 present, no m/r/g  Lungs: CTA b/l  Back: No CVA tenderness  Abd: Soft, non-tender,palpable mass appreciated at 14 weeks    :  Streak of blood noted at the urethra. No bleeding on pad.    External labia wnl. Cervix not palpable secondary to an anterior firm mass appreciated in the vaginal canal extending approximately 4 cm deep into the vagina. Can be appreciated 3 cm into the vaginal canal. Mass palpated and at that time bleeding started  Speculum Exam: Could not pass underneath the mass     Ext: non-tender b/l, no edema   Smooth rectal vaginal vault     LABS:                            7.7    7.53  )-----------( 343      ( 26 Oct 2022 07:00 )             25.8     10-26    134<L>  |  98  |  25<H>  ----------------------------<  91  3.9   |  23  |  0.91    Ca    10.0      26 Oct 2022 00:36  Phos  3.5     10-26  Mg     2.00     10      I&O's Detail    25 Oct 2022 07:01  -  26 Oct 2022 07:00  --------------------------------------------------------  IN:  Total IN: 0 mL    OUT:    Nephrostomy Tube (mL): 400 mL  Total OUT: 400 mL    Total NET: -400 mL              RADIOLOGY & ADDITIONAL STUDIES:  CT: ACC: [id number] EXAM:  CT ABDOMEN AND PELVIS                        ACC: [id number] EXAM:  CT CHEST                          PROCEDURE DATE:  2022          INTERPRETATION:  CLINICAL INFORMATION: Chest pain. Right nephrostomy   tube, hematuria.    COMPARISON: CT chest, abdomen and pelvis 2022, abdominal sonogram   2022    CONTRAST/COMPLICATIONS:  IV Contrast: None  Oral Contrast: None  Complications: None    PROCEDURE:  CT of the Chest, Abdomen and Pelvis was performed without intravenous   contrast.  Intravenous contrast: None.  Oral contrast: positive contrast was administered.  Sagittal and coronal reformats were performed.    FINDINGS:    CHEST:    LUNGS AND LARGE AIRWAYS: Patent central airways. Bilateral pulmonary   noduleshave again increased in size and number since the recent prior   chest CT. For example, left lower lobe nodule now measures 2.8 x 2.0 cm   and has cavitation (prior 2.5 x 1.9 cm). A right lower lobe nodule now   measures 2.5 x 2.0 cm (prior 2.0 x 1.8cm) with interval resolution of   cavitation within this nodule. Adjacent nodule now appears cavitary.  PLEURA: No pleural effusion. No pneumothorax.  VESSELS: Within normal limits.  HEART: Heart size is normal. Trace pericardial effusion. Distal leads of   cardiac device overlying the right atrium and ventricle, unchanged.  MEDIASTINUM AND FLORENTIN: No lymphadenopathy.  CHEST WALL AND LOWER NECK: Left chest wall dual-lead cardiac device.    ABDOMEN AND PELVIS:    LIVER: 2.4 cm hypodense lesion in the right hepatic lobe not   significantly changed from the prior exam. There are several new   hypodense lesions within the left and right hepatic lobes as for example   the left hepatic lobe on series 4 image 114 measures 2 cm and in the   right hepatic lobe 0.9 cm on series 4 image 134.  BILE DUCTS: Normal caliber.  GALLBLADDER: Within normal limits.  SPLEEN: Within normal limits.  PANCREAS: Within normal limits.  ADRENALS: Within normal limits.  KIDNEYS/URETERS: Right nephrostomy tube in place. No hydronephrosis or   perinephric stranding on the right. Severe left hydronephrosis and   scattered calcifications, unchanged. Minimal left perinephric stranding   similar to the prior exam.    BLADDER: Marked bladder wall thickening similar to the prior exam.  REPRODUCTIVE ORGANS: Uterus and adnexal structures similar to prior exam.    BOWEL: No bowel obstruction. Appendix is normal. No bowel wall thickening   or pericolonic inflammatory change. Underdistended stomach which limits   evaluation of the wall thickness.  PERITONEUM: No ascites. No free air.  VESSELS:  Within normal limits.  RETROPERITONEUM: Retroperitoneal adenopathy is not significantly changed.   Left periaortic lymph node measures 2.5 cm  ABDOMINAL WALL: Within normal limits.  BONES: No aggressive osteoblastic or lytic lesion.    IMPRESSION:  Chest CT: Interval progression of cavitary pulmonary metastatic disease   with increased size and number of pulmonary nodules. No evidence of   pneumonia.    CT abdomen/pelvis: Right nephrostomy tube in place without   hydronephrosis. Stable superior left hydronephrosis. Marked irregular   bladder wall thickening, unchanged.  Progression of liver metastasis. Stable retroperitoneal adenopathy.    --- End of Report ---            SARAI DUGGAN MD; Attending Radiologist  This document has been electronically signed. Sep 20 2022  3:17AM

## 2022-10-26 NOTE — CHART NOTE - NSCHARTNOTEFT_GEN_A_CORE
Called  by primary team, patient is in pain but has concerns about side affects of pain medications prescribed. Chart reviewed, multiple discussions noted with providers suggesting alternate pain regimens.     -can switch Morphine 2 mg IVP q2hr PRN to Dilaudid 0.2 mg IV q2hr PRN for breakthrough pain.     In the event of newly developing, evolving, or worsening symptoms, please contact the Palliative Medicine team via pager (if the patient is at [facility] #[phone] or if the patient is at [facility] #[phone]) The Geriatric and Palliative Medicine service has coverage 24 hours a day/ 7 days a week to provide medical recommendations regarding symptom management needs via telephone.    Manjit Purcell MD PGY4  Palliative Medicine Fellow Called  by primary team, patient is in pain but has concerns about side affects of pain medications prescribed. Chart reviewed, multiple discussions noted with providers suggesting alternate pain regimens.     -can trial switch from Morphine 2 mg IVP q2hr PRN to Dilaudid 0.2 mg IV q2hr PRN for breakthrough pain (hold for hypotension, oversedation, respiratory depression)      In the event of newly developing, evolving, or worsening symptoms, please contact the Palliative Medicine team via pager (if the patient is at [facility] #[phone] or if the patient is at [facility] #[phone]) The Geriatric and Palliative Medicine service has coverage 24 hours a day/ 7 days a week to provide medical recommendations regarding symptom management needs via telephone.    Manjit Purcell MD PGY4  Palliative Medicine Fellow

## 2022-10-26 NOTE — CONSULT NOTE ADULT - ATTENDING COMMENTS
57 y/o postmenopausal P5 with metastatic bladder ca admitted to medicine with SOB, hemoptysis and bilateral hip pain.  GYN consulted for vaginal bleeding for 3 weeks.  On exam, bladder mass obstructing vaginal canal, unable to pass speculum through.  Similar findings on bimanual exam, however, unable to palpate cervix. Bleeding seems to be coming from mass.  CT scan reviewed, no adnexal masses appreciated, uterus wnl.  Recommend pelvic US to assess endometrium, however, patient unsure if she is able to tolerate.  Otherwise continue care per primary team. Palliative recs appreciated.  No acute GYN interventions at this time.    Sheldon Hinojosa MD  Covering GYN [facility] Attending

## 2022-10-26 NOTE — PROGRESS NOTE ADULT - ASSESSMENT
( Note written / Date of service 10-26-22 )    ==================>> MEDICATIONS <<====================    acetaminophen     Tablet .. 650 milliGRAM(s) Oral every 6 hours  ascorbic acid 500 milliGRAM(s) Oral daily  chlorhexidine 2% Cloths 1 Application(s) Topical <User Schedule>  cholecalciferol 2000 Unit(s) Oral daily  ferrous    sulfate 325 milliGRAM(s) Oral daily  folic acid 1 milliGRAM(s) Oral daily  influenza   Vaccine 0.5 milliLiter(s) IntraMuscular once  lidocaine   4% Patch 1 Patch Transdermal every 24 hours  morphine   Solution 5 milliGRAM(s) Oral every 4 hours  pantoprazole    Tablet 40 milliGRAM(s) Oral before breakfast  saline laxative (FLEET) Rectal Enema 1 Enema Rectal once  senna 2 Tablet(s) Oral at bedtime    MEDICATIONS  (PRN):  aluminum hydroxide/magnesium hydroxide/simethicone Suspension 30 milliLiter(s) Oral every 4 hours PRN Dyspepsia  bisacodyl 5 milliGRAM(s) Oral daily PRN Constipation  melatonin 3 milliGRAM(s) Oral at bedtime PRN Insomnia  ondansetron Injectable 4 milliGRAM(s) IV Push every 8 hours PRN Nausea and/or Vomiting  simethicone 80 milliGRAM(s) Chew four times a day PRN Gas    ___________  Active diet:  Diet, Regular:   High Fiber (HIFIBER)  Supplement Feeding Modality:  Oral  Ensure Plus High Protein Cans or Servings Per Day:  2       Frequency:  Daily  ___________________    ==================>> VITAL SIGNS <<==================    Vital Signs Last 24 HrsT(C): 36.6 (10-26-22 @ 13:34)  T(F): 97.9 (10-26-22 @ 13:34), Max: 98.2 (10-25-22 @ 22:50)  HR: 74 (10-26-22 @ 13:34) (65 - 74)  BP: 110/65 (10-26-22 @ 13:34)  RR: 18 (10-26-22 @ 05:37) (18 - 18)  SpO2: 99% (10-26-22 @ 13:34) (99% - 100%)      CAPILLARY BLOOD GLUCOSE         ==================>> LAB AND IMAGING <<==================                        7.7    7.53  )-----------( 343      ( 26 Oct 2022 07:00 )             25.8        10-26    134<L>  |  98  |  25<H>  ----------------------------<  91  3.9   |  23  |  0.91    Ca    10.0      26 Oct 2022 00:36  Phos  3.5     10-26  Mg     2.00     10-26      WBC count:   7.53 <<== ,  8.69 <<== ,  8.09 <<== ,  9.02 <<== ,  8.22 <<==   Hemoglobin:   7.7 <<==,  7.6 <<==,  8.2 <<==,  8.0 <<==,  7.9 <<==  platelets:  343 <==, 331 <==, 379 <==, 319 <==, 352 <==, 366 <==    Creatinine:  0.91  <<==, 0.95  <<==, 0.89  <<==, 0.93  <<==, 0.97  <<==  Sodium:   134  <==, 136  <==, 134  <==, 136  <==, 137  <==       AST:               ALT:             AP:             Bili:            ____________________________    M I C R O B I O L O G Y :    Culture - Urine (collected 18 Oct 2022 20:50)  Source: Clean Catch Clean Catch (Midstream)  Final Report (21 Oct 2022 21:45):    10,000 - 49,000 CFU/mL Enterococcus faecalis  Organism: Enterococcus faecalis (21 Oct 2022 21:45)  Organism: Enterococcus faecalis (21 Oct 2022 21:45)    Sensitivities:      -  Ampicillin: S <=2 Predicts results to ampicillin/sulbactam, amoxacillin-clavulanate and  piperacillin-tazobactam.      -  Ciprofloxacin: S <=1      -  Levofloxacin: S <=1      -  Nitrofurantoin: S <=32 Should not be used to treat pyelonephritis.      -  Tetra/Doxy: R >8      -  Vancomycin: S 2      Method Type: PATRICIA        SARS-CoV-2: NotDetec (10-17-22 @ 21:31)  COVID-19 PCR: NotDetec (10-14-22 @ 06:00)  COVID-19 PCR: Detected (10-12-22 @ 06:51)     _________________________________________________________________________________________  ========>>  M E D I C A L   A T T E N D I N G    F O L L O W  U P  N O T E  <<=========  -----------------------------------------------------------------------------------------------------    - Patient seen and examined by me earlier today.   - In summary,  JEF HOUSE is a 56y year old woman admitted with back / flank pain  - Patient today overall not doing well, had small , hard BM ( but has not been compliant with all bowel regimen )     pt more comfortable today, less pain, taking morphine at times..   no more BMs reported.. eating / drinking  some soup > encouraged..     ==================>> REVIEW OF SYSTEM <<=================    GEN: no fever, no chills, on and off pain as above   RESP: no SOB, no cough, no sputum, no more hemoptysis reported   CVS: ++ chest / upper abd pain, no palpitations, no edema  GI: abdominal pain, no nausea, no constipation, no diarrhea  : no dysuria, no frequency, no hematuria in tube or voiding      + occasional chronic vaginal bleeding   Neuro: no headache, no dizziness  Derm : no itching, no rash    ==================>> PHYSICAL EXAM <<=================    GEN: A&O X 3 , NAD , uncomfortable, pleasant, calm , cachectic , pt encouraged to take meds as needed for comfort   HEENT: NCAT, PERRL, MMM, hearing intact  Neck: supple , no JVD appreciated  CVS: S1S2 , regular , No M/R/G appreciated  PULM: CTA B/L,  no W/R/R appreciated  ABD.: soft. non tender, non distended  Extrem: intact pulses , no edema     nephrostomy in place and draining to bag   PSYCH : normal mood,  not anxious                             ( Note written / Date of service 10-26-22 )    ==================>> MEDICATIONS <<====================    acetaminophen     Tablet .. 650 milliGRAM(s) Oral every 6 hours  ascorbic acid 500 milliGRAM(s) Oral daily  chlorhexidine 2% Cloths 1 Application(s) Topical <User Schedule>  cholecalciferol 2000 Unit(s) Oral daily  ferrous    sulfate 325 milliGRAM(s) Oral daily  folic acid 1 milliGRAM(s) Oral daily  influenza   Vaccine 0.5 milliLiter(s) IntraMuscular once  lidocaine   4% Patch 1 Patch Transdermal every 24 hours  morphine   Solution 5 milliGRAM(s) Oral every 4 hours  pantoprazole    Tablet 40 milliGRAM(s) Oral before breakfast  saline laxative (FLEET) Rectal Enema 1 Enema Rectal once  senna 2 Tablet(s) Oral at bedtime    MEDICATIONS  (PRN):  aluminum hydroxide/magnesium hydroxide/simethicone Suspension 30 milliLiter(s) Oral every 4 hours PRN Dyspepsia  bisacodyl 5 milliGRAM(s) Oral daily PRN Constipation  melatonin 3 milliGRAM(s) Oral at bedtime PRN Insomnia  ondansetron Injectable 4 milliGRAM(s) IV Push every 8 hours PRN Nausea and/or Vomiting  simethicone 80 milliGRAM(s) Chew four times a day PRN Gas    ___________  Active diet:  Diet, Regular:   High Fiber (HIFIBER)  Supplement Feeding Modality:  Oral  Ensure Plus High Protein Cans or Servings Per Day:  2       Frequency:  Daily  ___________________    ==================>> VITAL SIGNS <<==================    Vital Signs Last 24 HrsT(C): 36.6 (10-26-22 @ 13:34)  T(F): 97.9 (10-26-22 @ 13:34), Max: 98.2 (10-25-22 @ 22:50)  HR: 74 (10-26-22 @ 13:34) (65 - 74)  BP: 110/65 (10-26-22 @ 13:34)  RR: 18 (10-26-22 @ 05:37) (18 - 18)  SpO2: 99% (10-26-22 @ 13:34) (99% - 100%)         ==================>> LAB AND IMAGING <<==================                        7.7    7.53  )-----------( 343      ( 26 Oct 2022 07:00 )             25.8        10-26    134<L>  |  98  |  25<H>  ----------------------------<  91  3.9   |  23  |  0.91    Ca    10.0      26 Oct 2022 00:36  Phos  3.5     10-26  Mg     2.00     10-26      WBC count:   7.53 <<== ,  8.69 <<== ,  8.09 <<== ,  9.02 <<== ,  8.22 <<==   Hemoglobin:   7.7 <<==,  7.6 <<==,  8.2 <<==,  8.0 <<==,  7.9 <<==  platelets:  343 <==, 331 <==, 379 <==, 319 <==, 352 <==, 366 <==    Creatinine:  0.91  <<==, 0.95  <<==, 0.89  <<==, 0.93  <<==, 0.97  <<==  Sodium:   134  <==, 136  <==, 134  <==, 136  <==, 137  <==    ___________________________________________________________________________________  ===============>>  A S S E S S M E N T   A N D   P L A N <<===============  ------------------------------------------------------------------------------------------    · Assessment	  56 year old female with PMHx of Asthma, Anemia, Bladder cancer with 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     Problem/Plan - 1:  ·  Problem: low back / flank and chest pain.   ·  Plan: acute on chronic, involving both hips, with left worse than right. Affecting mobility. Not controlled by Tramadol and Tylenol.   CT scan of abdomen and pelvis: no evidence of osseous metastatic disease.  discussion as above       chest pain after taking morphine is not typical.. pain is not cardiac: likely from gas as pt with constipation >> bowel regimen given ... ) :  bowel regimen and simethiocone as ordered : encouraged   increase ambulation  as able   palliative follow up and mgmt  appreciated   bowel regimen for constipation   anti gas meds reordered    Problem/Plan - 2:  ·  Problem: Hemoptysis. on and off, small amounts   CT demonstrates: Numerous bilateral pulmonary metastases; mild interval increase in size of a few of the largest pulmonary metastases compared to 9/20/2022.  - Monitor for now  - pt planned for OP folowup with oncology for treatment / chemo vs immunotherapy   -  offered to have V/Q done but pt also declined stating she had it done in recent past and was negative.. ( done in May according to our records)     Problem/Plan - 3:  ·  Problem: Hypoglycemia.   ·  Plan: glucose to 59 via FS; non-diabetic, s/p D-50 in the ED  diet prescribed, along with supplements  - Monitor fingersticks.    Problem/Plan - 4:  ·  Problem: Primary cancer of bladder with metastasis to other site.   ·  Plan: with metastases and has left hydronephrosis  s/p right nephrostomy tube in place draining w/o difficulty  - Oncology consult / follow up ( pt states she will be changing oncologists from [facility], Dr Jones, to [facility])     occasional vaginal bleeding      pt declined TVUS and GYN exam before        GYN consult here appreciated     Problem/Plan - 5:  ·  Problem: Slow transit constipation.   ·  Plan: no bowel movement for the past 3 days while on opiate  - Bowel regimen as ordered   - Monitor BM.    Problem/Plan - 6:  ·  Problem: Protein calorie malnutrition.   ·  Plan: Poor PO intake, confused/frustrated as to what to eat at present. no longer taking MVI; will restart  - Nutrition consult     Problem/Plan - 7:  ·  Problem: Prophylactic measure.   ·  Plan: DVT ppx: will do SCD for now, will switch to Lovenox  no PT needs.    discharge planing when pain better controlled     --------------------------------------------  Case discussed with pt, med team   Education given on findings and plan of care  ___________________________  H. NIEVES Ochoa.  Pager: [phone]

## 2022-10-26 NOTE — PROGRESS NOTE ADULT - SUBJECTIVE AND OBJECTIVE BOX
SUBJECTIVE AND OBJECTIVE:  Yesterday patient was started on ATC IV morphine due to intense chest pain of unknown cause, however refused most doses. Received one dose last night and then subsequently began to have vaginal bleeding. Now patient believes the IV morphine caused the vaginal bleeding. Educated patient that they are not connected. Patient suggested going back to oral morphine 5 mg as scheduled dose.       INTERVAL HPI/OVERNIGHT EVENTS:    Allergies    No Known Allergies    Intolerances    MEDICATIONS  (STANDING):  acetaminophen     Tablet .. 650 milliGRAM(s) Oral every 6 hours  ascorbic acid 500 milliGRAM(s) Oral daily  chlorhexidine 2% Cloths 1 Application(s) Topical <User Schedule>  cholecalciferol 2000 Unit(s) Oral daily  ferrous    sulfate 325 milliGRAM(s) Oral daily  folic acid 1 milliGRAM(s) Oral daily  influenza   Vaccine 0.5 milliLiter(s) IntraMuscular once  lidocaine   4% Patch 1 Patch Transdermal every 24 hours  morphine   Solution 5 milliGRAM(s) Oral every 4 hours  pantoprazole    Tablet 40 milliGRAM(s) Oral before breakfast  saline laxative (FLEET) Rectal Enema 1 Enema Rectal once  senna 2 Tablet(s) Oral at bedtime    MEDICATIONS  (PRN):  aluminum hydroxide/magnesium hydroxide/simethicone Suspension 30 milliLiter(s) Oral every 4 hours PRN Dyspepsia  bisacodyl 5 milliGRAM(s) Oral daily PRN Constipation  melatonin 3 milliGRAM(s) Oral at bedtime PRN Insomnia  ondansetron Injectable 4 milliGRAM(s) IV Push every 8 hours PRN Nausea and/or Vomiting  simethicone 80 milliGRAM(s) Chew four times a day PRN Gas      ITEMS UNCHECKED ARE NOT PRESENT    PRESENT SYMPTOMS: [ ]Unable to self-report due to altered mental status- see [ ] CPOT [ ] PAINADS [ ] RDOS  Source if other than patient:  [ ]Family   [ ]Team     Pain: [x ]yes [ ]no  QOL impact - moderate  Location -    left chest                Aggravating factors  - movement   Quality - sharp  Radiation - right back and left later side   Timing- intermittent   Severity (0-10 scale): 8  Minimal acceptable level (0-10 scale): 2    Dyspnea:                           [ ]Mild [ ]Moderate [ ]Severe  Anxiety:                             [x ]Mild [ ]Moderate [ ]Severe  Agitation:                          [ ]Mild [ ]Moderate [ ]Severe  Fatigue:                             [ ]Mild [ ]Moderate [ ]Severe  Nausea:                             [ ]Mild [ ]Moderate [ ]Severe  Loss of appetite:              [ ]Mild [ ]Moderate [ ]Severe  Constipation:                   [ ]Mild [ ]Moderate [ ]Severe  Diarrhea:                          [ ]Mild [ ]Moderate [ ]Severe      CPOT:    https://www.[facility].org/getattachment/[email or web address]/Critical-Care-Pain-Observation-Tool-(CPOT)    PCSSQ[Palliative Care Spiritual Screening Question]   Severity (0-10): 5  Score of 4 or > indicate consideration of Chaplaincy referral.  Chaplaincy Referral: [x ] yes [ ] refused [ ] following [ ] deferred    Caregiver [place]? : [ ] yes [ ] no [ ] Declined [x ] Deferred              Social work referral [ ] Patient & Family Centered Care Referral [ ]     Anticipatory Grief present?:  [ ] yes [ ] no  [ x] Deferred                  Social work referral [ ] Chaplaincy Referral[ ]    Other Symptoms:  [x ]All other review of systems negative     PHYSICAL EXAM:  Vital Signs Last 24 Hrs  T(C): 36.7 (26 Oct 2022 05:37), Max: 37.1 (25 Oct 2022 13:50)  T(F): 98.1 (26 Oct 2022 05:37), Max: 98.7 (25 Oct 2022 13:50)  HR: 65 (26 Oct 2022 05:37) (65 - 86)  BP: 112/60 (26 Oct 2022 05:37) (102/51 - 122/69)  BP(mean): --  RR: 18 (26 Oct 2022 05:37) (18 - 18)  SpO2: 100% (26 Oct 2022 05:37) (100% - 100%)    Parameters below as of 26 Oct 2022 05:37  Patient On (Oxygen Delivery Method): room air    GENERAL:  [ x]Alert  [x ]Oriented x   [ ]Lethargic  [ ]Cachexia  [ ]Unarousable  [ x]Verbal  [ ]Non-Verbal    Behavioral:   [x ] Anxiety  [ ] Delirium [ ] Agitation [ ] Calm     HEENT:  [ x]Normal  [ ] Temporal Wasting  [ ]Dry mouth   [ ]ET Tube/Trach  [ ]Oral lesions  [ ] Mucositis  PULMONARY:   [x ]Clear [ ]Tachypnea  [ ]Audible excessive secretions   [ ]Rhonchi        [ ]Right [ ]Left [ ]Bilateral  [ ]Crackles        [ ]Right [ ]Left [ ]Bilateral  [ ]Wheezing     [ ]Right [ ]Left [ ]Bilateral  [ ]Diminished breath sounds [ ]right [ ]left [ ]bilateral  CARDIOVASCULAR:    [x ]Regular [ ]Irregular [ ]Tachy  [ ]Bradley [ ]Murmur [ ]Other  GASTROINTESTINAL:  [x ]Soft  [ ]Distended   [ ]+BS  [ x]Non tender [ ]Tender  [ ]PEG [ ]OGT/ NGT  Last BM: 10/19   GENITOURINARY: Nephrostomy tube   [ ]Normal [ ] Incontinent   [ ]Oliguria/Anuria   [ ]Lloyd  MUSCULOSKELETAL:   [ x]Normal   [ ]Weakness  [ ]Bed/Wheelchair bound [ ]Edema  [  ] amputation  [  ] contraction  NEUROLOGIC:   [ x]No focal deficits  [ ]Cognitive impairment  [ ]Dysphagia [ ]Dysarthria [ ]Paresis [ ]Other   SKIN: See Nursing Skin Assessment for further details  [x ]Normal    [ ]Rash  [ ]Pressure ulcer(s)       Present on admission [ ]y [ ]n   [  ]  Wound    [  ] hyperpigmentation    CRITICAL CARE:  [ ]Shock Present  [ ]Septic [ ]Cardiogenic [ ]Neurologic [ ]Hypovolemic  [ ]Vasopressors [ ]Inotropes  [ ]Respiratory failure present [ ]Mechanical Ventilation [ ]Non-invasive ventilatory support [ ]High-Flow   [ ]Acute  [ ]Chronic [ ]Hypoxic  [ ]Hypercarbic [ ]Other  [ ]Other organ failure     LABS:                          7.7    7.53  )-----------( 343      ( 26 Oct 2022 07:00 )             25.8     10-26    134<L>  |  98  |  25<H>  ----------------------------<  91  3.9   |  23  |  0.91    Ca    10.0      26 Oct 2022 00:36  Phos  3.5     10-26  Mg     2.00     10-26      RADIOLOGY & ADDITIONAL STUDIES: reviewed     Protein Calorie Malnutrition Present: [ ]mild [ ]moderate [x ]severe [ ]underweight [ ]morbid obesity  https://www.andeal.org/vault/4720/web/files/ONC/Table_Clinical%20Characteristics%20to%20Document%20Malnutrition-White%20JV%20et%20al%202012.pdf    Height (cm): 162.6 (10-18-22 @ 18:03), 162.6 (09-19-22 @ 14:23), 162.6 (08-15-22 @ 15:45)  Weight (kg): 38 (10-18-22 @ 18:03), 38.3 (09-19-22 @ 14:23), 38.6 (08-15-22 @ 15:45)  BMI (kg/m2): 14.4 (10-18-22 @ 18:03), 14.5 (09-19-22 @ 14:23), 14.6 (08-15-22 @ 15:45)    [ ]PPSV2 < or = 30%  [ ]significant weight loss [ ]poor nutritional intake [ ]anasarca   [ ]Artificial Nutrition    REFERRALS:   [ ]Chaplaincy  [ ]Hospice  [ ]Child Life  [ ]Social Work  [x ]Case management [ ]Holistic Therapy

## 2022-10-26 NOTE — CONSULT NOTE ADULT - ASSESSMENT
56 year old P5, LMP 2019 admitted to medicine for hemoptysis, SOB and pain in the setting of metastatic bladder cancer. . Patient with 3 weeks of vaginal bleeding, that has increased since yesterday but stopped this morning.     -Recommend TVUS however patient uncomfortable with digital exam and unsure if vaginal probe is able to transverse the vaginal canal secondary to obstructing mass   -Patient also unsure if she wants any further exams secondary to her pain   -The cervix could not be visualized or palpated secondary to mass in the vaginal canal   -Monitor H/H  -Follow up recommendations of medical oncology and palliative care   - No acute Gyn intervention at this time     d/w Dr. Micaela Miramontes, PGY3 56 year old P5, LMP 2019 admitted to medicine for hemoptysis, SOB and pain in the setting of metastatic bladder cancer. . Patient with 3 weeks of vaginal bleeding, that has increased since yesterday but stopped this morning.     -Recommend TVUS however patient uncomfortable with digital exam and unsure if vaginal probe is able to tranverse the vaginal canal secondary to obstructing mass   -Patient also unsure if she wants any further exams secondary to her pain   -The cervix could not be visualized or palpated secondary to mass in the vaginal canal   -Monitor H/H  -Follow up recommendations of medical oncology and palliative care   - No acute Gyn intervention at this time     d/w Dr. Micaela Miramontes, PGY3 56 year old P5, LMP 2019 admitted to medicine for hemoptysis, SOB and pain in the setting of metastatic bladder cancer. . Patient with 3 weeks of vaginal bleeding, that has increased since yesterday but stopped this morning.     -Recommend TVUS however patient uncomfortable with digital exam and unsure if vaginal probe is able to traverse the vaginal canal secondary to obstructing mass   -Patient also unsure if she wants any further exams secondary to her pain   -The cervix could not be visualized or palpated secondary to mass in the vaginal canal   -Monitor H/H  -Follow up recommendations of medical oncology and palliative care   -Consider consulting urology  - No acute Gyn intervention at this time     d/w Dr. Micaela Miramontes, PGY3

## 2022-10-26 NOTE — ED ADULT NURSE NOTE - BREATHING, MLM
Nasal flaring Cyclophosphamide Counseling:  I discussed with the patient the risks of cyclophosphamide including but not limited to hair loss, hormonal abnormalities, decreased fertility, abdominal pain, diarrhea, nausea and vomiting, bone marrow suppression and infection. The patient understands that monitoring is required while taking this medication.

## 2022-10-27 NOTE — CHART NOTE - NSCHARTNOTEFT_GEN_A_CORE
Source: Patient [X]    Family [ ]     other [X] electronic chart, RN     Diet : Diet, Regular:   High Fiber (HIFIBER)  Supplement Feeding Modality:  Oral  Ensure Plus High Protein Cans or Servings Per Day:  2       Frequency:  Daily (10-19-22 @ 11:46)    Nutrition follow-up note, severe malnutrition. 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     Patient continues with variable po intake 25-<75% of meals. C/o pain and constipation. Last bowel movement on 10/24 and on bowel regimen. Patient denies any nausea/vomiting, difficulty chewing and swallowing. Importance of having small frequent po intake of nutrient and protein dense foods suggested. Constipation Nutrition Therapy also provided. Patient became tearful during conversation due to pain and was provided with pain medication by RN.         Weight: 38kg (10/19)    No new weight recorded. RN to obtain new weight as able.       Pertinent Medications: acetaminophen     Tablet ..  ascorbic acid  cholecalciferol  ferrous    sulfate  folic acid  morphine   Solution  pantoprazole    Tablet  saline laxative (FLEET) Rectal Enema  senna    Pertinent Labs:  10-27 Na139 mmol/L Glu 81 mg/dL K+ 4.4 mmol/L Cr  0.88 mg/dL BUN 20 mg/dL 10-27 Phos 3.2 mg/dL 10-18 PAB 19 mg/dL<L>      Skin: No pressure ulcers/DTI noted in flowsheets.   No edema noted.     Estimated Needs:   [X] no change since previous assessment  [ ] recalculated:       Previous Nutrition Diagnosis:     [X ] Increased Nutrient Needs [X ] Malnutrition, severe   Nutrition Diagnosis is [X ] ongoing  [ ] resolved [ ] not applicable     Additional Recommendations:     1. Continue current diet order, which remains appropriate at this time.   2. Monitor weights, labs, BM's, skin integrity, p.o. intake.   3. Honor food and beverage preferences within diet restriction of patient in an effort to maximize level of nutrient intake   4. Please Encourage po intake, assist with meals and menu selections, provide alternatives PRN.   5. Obtain new weight and weekly weight.   6. Please document % PO intake in nursing flowsheet.

## 2022-10-27 NOTE — PROGRESS NOTE ADULT - ASSESSMENT
( Note written / Date of service 10-27-22 )    ==================>> MEDICATIONS <<====================    acetaminophen     Tablet .. 650 milliGRAM(s) Oral every 6 hours  ascorbic acid 500 milliGRAM(s) Oral daily  chlorhexidine 2% Cloths 1 Application(s) Topical <User Schedule>  cholecalciferol 2000 Unit(s) Oral daily  ferrous    sulfate 325 milliGRAM(s) Oral daily  folic acid 1 milliGRAM(s) Oral daily  influenza   Vaccine 0.5 milliLiter(s) IntraMuscular once  lidocaine   4% Patch 1 Patch Transdermal every 24 hours  morphine   Solution 5 milliGRAM(s) Oral every 4 hours  pantoprazole    Tablet 40 milliGRAM(s) Oral before breakfast  saline laxative (FLEET) Rectal Enema 1 Enema Rectal once  senna 2 Tablet(s) Oral at bedtime    MEDICATIONS  (PRN):  aluminum hydroxide/magnesium hydroxide/simethicone Suspension 30 milliLiter(s) Oral every 4 hours PRN Dyspepsia  bisacodyl 5 milliGRAM(s) Oral daily PRN Constipation  melatonin 3 milliGRAM(s) Oral at bedtime PRN Insomnia  ondansetron Injectable 4 milliGRAM(s) IV Push every 8 hours PRN Nausea and/or Vomiting  simethicone 80 milliGRAM(s) Chew four times a day PRN Gas    ___________  Active diet:  Diet, Regular:   High Fiber (HIFIBER)  Supplement Feeding Modality:  Oral  Ensure Plus High Protein Cans or Servings Per Day:  2       Frequency:  Daily  ___________________    ==================>> VITAL SIGNS <<==================    Vital Signs Last 24 HrsT(C): 36.7 (10-27-22 @ 13:14)  T(F): 98.1 (10-27-22 @ 13:14), Max: 98.2 (10-26-22 @ 20:52)  HR: 69 (10-27-22 @ 13:14) (64 - 79)  BP: 121/58 (10-27-22 @ 13:14)  RR: 17 (10-27-22 @ 07:12) (17 - 17)  SpO2: 100% (10-27-22 @ 13:14) (98% - 100%)      CAPILLARY BLOOD GLUCOSE         ==================>> LAB AND IMAGING <<==================                        7.6    8.48  )-----------( 389      ( 27 Oct 2022 06:45 )             25.5        10-27    139  |  103  |  20  ----------------------------<  81  4.4   |  25  |  0.88    Ca    9.9      27 Oct 2022 06:45  Phos  3.2     10-27  Mg     2.10     10-27      WBC count:   8.48 <<== ,  7.53 <<== ,  8.69 <<== ,  8.09 <<== ,  9.02 <<==   Hemoglobin:   7.6 <<==,  7.7 <<==,  7.6 <<==,  8.2 <<==,  8.0 <<==  platelets:  389 <==, 343 <==, 331 <==, 379 <==, 319 <==, 352 <==    Creatinine:  0.88  <<==, 0.91  <<==, 0.95  <<==, 0.89  <<==, 0.93  <<==  Sodium:   139  <==, 134  <==, 136  <==, 134  <==, 136  <==       AST:               ALT:             AP:             Bili:            ____________________________    M I C R O B I O L O G Y :    Culture - Urine (collected 18 Oct 2022 20:50)  Source: Clean Catch Clean Catch (Midstream)  Final Report (21 Oct 2022 21:45):    10,000 - 49,000 CFU/mL Enterococcus faecalis  Organism: Enterococcus faecalis (21 Oct 2022 21:45)  Organism: Enterococcus faecalis (21 Oct 2022 21:45)    Sensitivities:      -  Ampicillin: S <=2 Predicts results to ampicillin/sulbactam, amoxacillin-clavulanate and  piperacillin-tazobactam.      -  Ciprofloxacin: S <=1      -  Levofloxacin: S <=1      -  Nitrofurantoin: S <=32 Should not be used to treat pyelonephritis.      -  Tetra/Doxy: R >8      -  Vancomycin: S 2      Method Type: PATRICIA        SARS-CoV-2: NotDetec (10-17-22 @ 21:31)  COVID-19 PCR: NotDetec (10-14-22 @ 06:00)  COVID-19 PCR: Detected (10-12-22 @ 06:51)     _________________________________________________________________________________________  ========>>  M E D I C A L   A T T E N D I N G    F O L L O W  U P  N O T E  <<=========  -----------------------------------------------------------------------------------------------------    - Patient seen and examined by me earlier today.   - In summary,  JEF HOUSE is a 56y year old woman admitted with back / flank pain  - Patient today overall not doing well, had small , hard BM ( but has not been compliant with all bowel regimen )     pt more comfortable today, less pain, taking morphine at times..   no more BMs reported.. eating / drinking  some soup > encouraged..     ==================>> REVIEW OF SYSTEM <<=================    GEN: no fever, no chills, on and off pain as above   RESP: no SOB, no cough, no sputum, no more hemoptysis reported   CVS: ++ chest / upper abd pain, no palpitations, no edema  GI: abdominal pain, no nausea, no constipation, no diarrhea  : no dysuria, no frequency, no hematuria in tube or voiding      + occasional chronic vaginal bleeding   Neuro: no headache, no dizziness  Derm : no itching, no rash    ==================>> PHYSICAL EXAM <<=================    GEN: A&O X 3 , NAD , uncomfortable, pleasant, calm , cachectic , pt encouraged to take meds as needed for comfort   HEENT: NCAT, PERRL, MMM, hearing intact  Neck: supple , no JVD appreciated  CVS: S1S2 , regular , No M/R/G appreciated  PULM: CTA B/L,  no W/R/R appreciated  ABD.: soft. non tender, non distended  Extrem: intact pulses , no edema     nephrostomy in place and draining to bag   PSYCH : normal mood,  not anxious                                 ( Note written / Date of service 10-27-22 )    ==================>> MEDICATIONS <<====================    acetaminophen     Tablet .. 650 milliGRAM(s) Oral every 6 hours  ascorbic acid 500 milliGRAM(s) Oral daily  chlorhexidine 2% Cloths 1 Application(s) Topical <User Schedule>  cholecalciferol 2000 Unit(s) Oral daily  ferrous    sulfate 325 milliGRAM(s) Oral daily  folic acid 1 milliGRAM(s) Oral daily  influenza   Vaccine 0.5 milliLiter(s) IntraMuscular once  lidocaine   4% Patch 1 Patch Transdermal every 24 hours  morphine   Solution 5 milliGRAM(s) Oral every 4 hours  pantoprazole    Tablet 40 milliGRAM(s) Oral before breakfast  saline laxative (FLEET) Rectal Enema 1 Enema Rectal once  senna 2 Tablet(s) Oral at bedtime    MEDICATIONS  (PRN):  aluminum hydroxide/magnesium hydroxide/simethicone Suspension 30 milliLiter(s) Oral every 4 hours PRN Dyspepsia  bisacodyl 5 milliGRAM(s) Oral daily PRN Constipation  melatonin 3 milliGRAM(s) Oral at bedtime PRN Insomnia  ondansetron Injectable 4 milliGRAM(s) IV Push every 8 hours PRN Nausea and/or Vomiting  simethicone 80 milliGRAM(s) Chew four times a day PRN Gas    ___________  Active diet:  Diet, Regular:   High Fiber (HIFIBER)  Supplement Feeding Modality:  Oral  Ensure Plus High Protein Cans or Servings Per Day:  2       Frequency:  Daily  ___________________    ==================>> VITAL SIGNS <<==================    Vital Signs Last 24 HrsT(C): 36.7 (10-27-22 @ 13:14)  T(F): 98.1 (10-27-22 @ 13:14), Max: 98.2 (10-26-22 @ 20:52)  HR: 69 (10-27-22 @ 13:14) (64 - 79)  BP: 121/58 (10-27-22 @ 13:14)  RR: 17 (10-27-22 @ 07:12) (17 - 17)  SpO2: 100% (10-27-22 @ 13:14) (98% - 100%)       ==================>> LAB AND IMAGING <<==================                        7.6    8.48  )-----------( 389      ( 27 Oct 2022 06:45 )             25.5        10-27    139  |  103  |  20  ----------------------------<  81  4.4   |  25  |  0.88    Ca    9.9      27 Oct 2022 06:45  Phos  3.2     10-27  Mg     2.10     10-27      WBC count:   8.48 <<== ,  7.53 <<== ,  8.69 <<== ,  8.09 <<== ,  9.02 <<==   Hemoglobin:   7.6 <<==,  7.7 <<==,  7.6 <<==,  8.2 <<==,  8.0 <<==  platelets:  389 <==, 343 <==, 331 <==, 379 <==, 319 <==, 352 <==    Creatinine:  0.88  <<==, 0.91  <<==, 0.95  <<==, 0.89  <<==, 0.93  <<==  Sodium:   139  <==, 134  <==, 136  <==, 134  <==, 136  <==           ____________________________    M I C R O B I O L O G Y :    Culture - Urine (collected 18 Oct 2022 20:50)  Source: Clean Catch Clean Catch (Midstream)  Final Report (21 Oct 2022 21:45):    10,000 - 49,000 CFU/mL Enterococcus faecalis  Organism: Enterococcus faecalis (21 Oct 2022 21:45)  Organism: Enterococcus faecalis (21 Oct 2022 21:45)    Sensitivities:      -  Ampicillin: S <=2 Predicts results to ampicillin/sulbactam, amoxacillin-clavulanate and  piperacillin-tazobactam.      -  Ciprofloxacin: S <=1      -  Levofloxacin: S <=1      -  Nitrofurantoin: S <=32 Should not be used to treat pyelonephritis.      -  Tetra/Doxy: R >8      -  Vancomycin: S 2      Method Type: PATRICIA    SARS-CoV-2: NotDetec (10-17-22 @ 21:31)  COVID-19 PCR: NotDetec (10-14-22 @ 06:00)  COVID-19 PCR: Detected (10-12-22 @ 06:51)      ___________________________________________________________________________________  ===============>>  A S S E S S M E N T   A N D   P L A N <<===============  ------------------------------------------------------------------------------------------    · Assessment	  56 year old female with PMHx of Asthma, Anemia, Bladder cancer with 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     Problem/Plan - 1:  ·  Problem: low back / flank and chest pain.   ·  Plan: acute on chronic, involving both hips, with left worse than right. Affecting mobility. Not controlled by Tramadol and Tylenol.   CT scan of abdomen and pelvis: no evidence of osseous metastatic disease.  discussion as above       chest pain after taking morphine is not typical.. pain is not cardiac: likely from gas as pt with constipation >> bowel regimen given ... ) :  bowel regimen and simethiocone as ordered : encouraged   increase ambulation  as able   palliative follow up and mgmt  appreciated   bowel regimen for constipation   anti gas meds reordered    Problem/Plan - 2:  ·  Problem: Hemoptysis. on and off, small amounts   CT demonstrates: Numerous bilateral pulmonary metastases; mild interval increase in size of a few of the largest pulmonary metastases compared to 9/20/2022.  - Monitor for now  - pt planned for OP folowup with oncology for treatment / chemo vs immunotherapy   -  offered to have V/Q done but pt also declined stating she had it done in recent past and was negative.. ( done in May according to our records)     Problem/Plan - 3:  ·  Problem: Hypoglycemia.   ·  Plan: glucose to 59 via FS; non-diabetic, s/p D-50 in the ED  diet prescribed, along with supplements  - Monitor fingersticks.    Problem/Plan - 4:  ·  Problem: Primary cancer of bladder with metastasis to other site.   ·  Plan: with metastases and has left hydronephrosis  s/p right nephrostomy tube in place draining w/o difficulty  - Oncology consult / follow up ( pt states she will be changing oncologists from [facility], Dr Jones, to [facility] / mian)     occasional vaginal bleeding      pt declined TVUS and GYN exam before        GYN consult here appreciated     Chronic anemia / anemia of chronic disease     monitor as seems to be declinig..     pt has declined blood transfusion      will discuss with pt re IV iron     Problem/Plan - 5:  ·  Problem: Slow transit constipation.   ·  Plan: no bowel movement for the past 3 days while on opiate  - Bowel regimen as ordered   - Monitor BM.    Problem/Plan - 6:  ·  Problem: Protein calorie malnutrition.   ·  Plan: Poor PO intake, confused/frustrated as to what to eat at present. no longer taking MVI; will restart  - Nutrition consult     Problem/Plan - 7:  ·  Problem: Prophylactic measure.   ·  Plan: DVT ppx: will do SCD for now, will switch to Lovenox  no PT needs.    discharge planing when pain better controlled     --------------------------------------------  Case discussed with pt, med team   Education given on findings and plan of care  ___________________________  H. NIEVES Ochoa.  Pager: [phone]

## 2022-10-28 NOTE — PROGRESS NOTE ADULT - ASSESSMENT
_________________________________________________________________________________________  ========>>  M E D I C A L   A T T E N D I N G    F O L L O W  U P  N O T E  <<=========  -----------------------------------------------------------------------------------------------------    - Patient seen and examined by me earlier today.   - In summary,  JEF HOUSE is a 56y year old woman admitted with back / flank pain  - Patient today overall doing a bit better, + on and off chest pain, no more BM reported      pain somewhat otherwise controlled, taking morphine at times..       ==================>> REVIEW OF SYSTEM <<=================    GEN: no fever, no chills, on and off pain as above   RESP: no SOB, no cough, no sputum, slight hemoptysis at times   CVS: ++ chest / upper abd pain, no palpitations, no edema  GI: abdominal pain, no nausea, no constipation, no diarrhea  : no dysuria, no frequency, no hematuria in tube or voiding      + occasional chronic vaginal bleeding   Neuro: no headache, no dizziness  Derm : no itching, no rash    ==================>> PHYSICAL EXAM <<=================    GEN: A&O X 3 , NAD , uncomfortable, pleasant, calm , cachectic , pt encouraged to take meds as needed for comfort   HEENT: NCAT, PERRL, MMM, hearing intact  Neck: supple , no JVD appreciated  CVS: S1S2 , regular , No M/R/G appreciated  PULM: CTA B/L,  no W/R/R appreciated  ABD.: soft. non tender, non distended  Extrem: intact pulses , no edema     nephrostomy in place and draining to bag   PSYCH : normal mood,  not anxious                                   ( Note written / Date of service 10-28-22 )    ==================>> MEDICATIONS <<====================    acetaminophen     Tablet .. 650 milliGRAM(s) Oral every 6 hours  ascorbic acid 500 milliGRAM(s) Oral daily  chlorhexidine 2% Cloths 1 Application(s) Topical <User Schedule>  cholecalciferol 2000 Unit(s) Oral daily  ferrous    sulfate 325 milliGRAM(s) Oral daily  folic acid 1 milliGRAM(s) Oral daily  influenza   Vaccine 0.5 milliLiter(s) IntraMuscular once  lidocaine   4% Patch 1 Patch Transdermal every 24 hours  morphine   Solution 5 milliGRAM(s) Oral every 4 hours  pantoprazole    Tablet 40 milliGRAM(s) Oral before breakfast  saline laxative (FLEET) Rectal Enema 1 Enema Rectal once  senna 2 Tablet(s) Oral at bedtime    MEDICATIONS  (PRN):  aluminum hydroxide/magnesium hydroxide/simethicone Suspension 30 milliLiter(s) Oral every 4 hours PRN Dyspepsia  bisacodyl 5 milliGRAM(s) Oral daily PRN Constipation  melatonin 3 milliGRAM(s) Oral at bedtime PRN Insomnia  ondansetron Injectable 4 milliGRAM(s) IV Push every 8 hours PRN Nausea and/or Vomiting  simethicone 80 milliGRAM(s) Chew four times a day PRN Gas    ___________  Active diet:  Diet, Regular:   High Fiber (HIFIBER)  Supplement Feeding Modality:  Oral  Ensure Plus High Protein Cans or Servings Per Day:  2       Frequency:  Daily  ___________________    ==================>> VITAL SIGNS <<==================    Vital Signs Last 24 HrsT(C): 36.9 (10-28-22 @ 12:33)  T(F): 98.4 (10-28-22 @ 12:33), Max: 98.4 (10-27-22 @ 21:11)  HR: 69 (10-28-22 @ 12:33) (64 - 69)  BP: 104/54 (10-28-22 @ 12:33)  RR: 17 (10-28-22 @ 12:33) (17 - 18)  SpO2: 100% (10-28-22 @ 12:33) (100% - 100%)       ==================>> LAB AND IMAGING <<==================                        7.9    8.91  )-----------( 413      ( 28 Oct 2022 06:34 )             27.8        10-28    136  |  100  |  19  ----------------------------<  87  4.5   |  27  |  0.87    Ca    10.4      28 Oct 2022 06:34  Phos  3.5     10-28  Mg     2.20     10-28      WBC count:   8.91 <<== ,  8.48 <<== ,  7.53 <<== ,  8.69 <<== ,  8.09 <<==   Hemoglobin:   7.9 <<==,  7.6 <<==,  7.7 <<==,  7.6 <<==,  8.2 <<==  platelets:  413 <==, 389 <==, 343 <==, 331 <==, 379 <==, 319 <==    Creatinine:  0.87  <<==, 0.88  <<==, 0.91  <<==, 0.95  <<==, 0.89  <<==  Sodium:   136  <==, 139  <==, 134  <==, 136  <==, 134  <==      ___________________________________________________________________________________  ===============>>  A S S E S S M E N T   A N D   P L A N <<===============  ------------------------------------------------------------------------------------------    · Assessment	  56 year old female with PMHx of Asthma, Anemia, Bladder cancer with 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     Problem/Plan - 1:  ·  Problem: low back / flank and chest pain.   ·  Plan: acute on chronic, involving both hips, with left worse than right. Affecting mobility. Not controlled by Tramadol and Tylenol.   CT scan of abdomen and pelvis: no evidence of osseous metastatic disease.  discussion as above       chest pain after taking morphine is not typical.. pain is not cardiac: likely from gas as pt with constipation >> bowel regimen given ... ) :  bowel regimen and simethiocone as ordered : encouraged   increase ambulation  as able   palliative follow up and mgmt  appreciated   bowel regimen for constipation   anti gas meds reordered    Problem/Plan - 2:  ·  Problem: Hemoptysis. on and off, small amounts   CT demonstrates: Numerous bilateral pulmonary metastases; mild interval increase in size of a few of the largest pulmonary metastases compared to 9/20/2022.  - Monitor for now  - pt planned for OP folowup with oncology for treatment / chemo vs immunotherapy   -  offered to have V/Q done but pt also declined stating she had it done in recent past and was negative.. ( done in May according to our records)     Problem/Plan - 3:  ·  Problem: Hypoglycemia.   ·  Plan: glucose to 59 via FS; non-diabetic, s/p D-50 in the ED  diet prescribed, along with supplements  - Monitor fingersticks.    Problem/Plan - 4:  ·  Problem: Primary cancer of bladder with metastasis to other site.   ·  Plan: with metastases and has left hydronephrosis  s/p right nephrostomy tube in place draining w/o difficulty  - Oncology consult / follow up ( pt states she will be changing oncologists from [facility], Dr Jones, to [facility] / [facility])    cancer has extended to the vagina causing some bleeding as above     pt declined TVUS and GYN exam before ( probably wise as likely will cause more bleeding)        GYN appreciated           no additional benefit from a  consult     Chronic anemia / anemia of chronic disease     monitor as seems to be declinig..     pt has declined blood transfusion      will discuss with pt / order  IV iron if agreeable     Problem/Plan - 5:  ·  Problem: Slow transit constipation.   ·  Plan: no bowel movement for the past 3 days while on opiate  - Bowel regimen as ordered   - Monitor BM.  - treat constipation : also offered Linzess  but declined; agreed to mineral oil :  sister to buy and bring in     Problem/Plan - 6:  ·  Problem: Protein calorie malnutrition.   ·  Plan: Poor PO intake, confused/frustrated as to what to eat at present. no longer taking MVI; will restart    Problem/Plan - 7:  ·  Problem: Prophylactic measure.   ·  Plan: DVT ppx: will do SCD for now, will switch to Lovenox  no PT needs.    discharge planing when pain better controlled     --------------------------------------------  Case discussed with pt, sister, NP  Education given on findings and plan of care  ___________________________  HLeslee Ochoa D.O.  Pager: [phone]

## 2022-10-28 NOTE — PROGRESS NOTE ADULT - SUBJECTIVE AND OBJECTIVE BOX
SUBJECTIVE AND OBJECTIVE:  Patient seen this PM. She has been taking morphine 5 mg ATC as ordered, and is reporting feeling better, just having some discomfort at left chest area. She is constipated, recorded she is refusing some laxatives. She thinks eating more fiber will help. Discussed while she's on opioids, she should have other meds to help with BM. Patient opened up about her home life- takes care her 19 year old daughter and 57 year old sister with down syndrome who is high functioning. Currently another sister is helping care for them. Patient has 5 children, one son in the army.       INTERVAL HPI/OVERNIGHT EVENTS:    Allergies    No Known Allergies    Intolerances    MEDICATIONS  (STANDING):  MEDICATIONS  (STANDING):  acetaminophen     Tablet .. 650 milliGRAM(s) Oral every 6 hours  ascorbic acid 500 milliGRAM(s) Oral daily  chlorhexidine 2% Cloths 1 Application(s) Topical <User Schedule>  cholecalciferol 2000 Unit(s) Oral daily  ferrous    sulfate 325 milliGRAM(s) Oral daily  folic acid 1 milliGRAM(s) Oral daily  influenza   Vaccine 0.5 milliLiter(s) IntraMuscular once  lidocaine   4% Patch 1 Patch Transdermal every 24 hours  morphine   Solution 5 milliGRAM(s) Oral every 4 hours  pantoprazole    Tablet 40 milliGRAM(s) Oral before breakfast  saline laxative (FLEET) Rectal Enema 1 Enema Rectal once  senna 2 Tablet(s) Oral at bedtime    MEDICATIONS  (PRN):  aluminum hydroxide/magnesium hydroxide/simethicone Suspension 30 milliLiter(s) Oral every 4 hours PRN Dyspepsia  bisacodyl 5 milliGRAM(s) Oral daily PRN Constipation  melatonin 3 milliGRAM(s) Oral at bedtime PRN Insomnia  ondansetron Injectable 4 milliGRAM(s) IV Push every 8 hours PRN Nausea and/or Vomiting  simethicone 80 milliGRAM(s) Chew four times a day PRN Gas      ITEMS UNCHECKED ARE NOT PRESENT    PRESENT SYMPTOMS: [ ]Unable to self-report due to altered mental status- see [ ] CPOT [ ] PAINADS [ ] RDOS  Source if other than patient:  [ ]Family   [ ]Team     Pain: [x ]yes [ ]no  QOL impact - moderate  Location -    left chest                Aggravating factors  - movement   Quality - sharp  Radiation - right back and left later side   Timing- intermittent   Severity (0-10 scale): 8  Minimal acceptable level (0-10 scale): 2    Dyspnea:                           [ ]Mild [ ]Moderate [ ]Severe  Anxiety:                             [x ]Mild [ ]Moderate [ ]Severe  Agitation:                          [ ]Mild [ ]Moderate [ ]Severe  Fatigue:                             [ ]Mild [ ]Moderate [ ]Severe  Nausea:                             [ ]Mild [ ]Moderate [ ]Severe  Loss of appetite:              [ ]Mild [ ]Moderate [ ]Severe  Constipation:                   [ ]Mild [ ]Moderate [ ]Severe  Diarrhea:                          [ ]Mild [ ]Moderate [ ]Severe      CPOT:    https://www.[facility].org/getattachment/[email or web address]/Critical-Care-Pain-Observation-Tool-(CPOT)    PCSSQ[Palliative Care Spiritual Screening Question]   Severity (0-10): 5  Score of 4 or > indicate consideration of Chaplaincy referral.  Chaplaincy Referral: [x ] yes [ ] refused [ ] following [ ] deferred    Caregiver [place]? : [ ] yes [ ] no [ ] Declined [x ] Deferred              Social work referral [ ] Patient & Family Centered Care Referral [ ]     Anticipatory Grief present?:  [ ] yes [ ] no  [ x] Deferred                  Social work referral [ ] Chaplaincy Referral[ ]    Other Symptoms:  [x ]All other review of systems negative     PHYSICAL EXAM:  Vital Signs Last 24 Hrs  T(C): 36.9 (28 Oct 2022 12:33), Max: 36.9 (27 Oct 2022 21:11)  T(F): 98.4 (28 Oct 2022 12:33), Max: 98.4 (27 Oct 2022 21:11)  HR: 69 (28 Oct 2022 12:33) (64 - 69)  BP: 104/54 (28 Oct 2022 12:33) (100/50 - 114/51)  BP(mean): --  RR: 17 (28 Oct 2022 12:33) (17 - 18)  SpO2: 100% (28 Oct 2022 12:33) (100% - 100%)    Parameters below as of 28 Oct 2022 12:33  Patient On (Oxygen Delivery Method): room air      GENERAL:  [ x]Alert  [x ]Oriented x   [ ]Lethargic  [ ]Cachexia  [ ]Unarousable  [ x]Verbal  [ ]Non-Verbal    Behavioral:   [x ] Anxiety  [ ] Delirium [ ] Agitation [ ] Calm     HEENT:  [ x]Normal  [ ] Temporal Wasting  [ ]Dry mouth   [ ]ET Tube/Trach  [ ]Oral lesions  [ ] Mucositis  PULMONARY:   [x ]Clear [ ]Tachypnea  [ ]Audible excessive secretions   [ ]Rhonchi        [ ]Right [ ]Left [ ]Bilateral  [ ]Crackles        [ ]Right [ ]Left [ ]Bilateral  [ ]Wheezing     [ ]Right [ ]Left [ ]Bilateral  [ ]Diminished breath sounds [ ]right [ ]left [ ]bilateral  CARDIOVASCULAR:    [x ]Regular [ ]Irregular [ ]Tachy  [ ]Bradley [ ]Murmur [ ]Other  GASTROINTESTINAL:  [x ]Soft  [ ]Distended   [ ]+BS  [ x]Non tender [ ]Tender  [ ]PEG [ ]OGT/ NGT  Last BM: 10/19   GENITOURINARY: Nephrostomy tube   [ ]Normal [ ] Incontinent   [ ]Oliguria/Anuria   [ ]Lloyd  MUSCULOSKELETAL:   [ x]Normal   [ ]Weakness  [ ]Bed/Wheelchair bound [ ]Edema  [  ] amputation  [  ] contraction  NEUROLOGIC:   [ x]No focal deficits  [ ]Cognitive impairment  [ ]Dysphagia [ ]Dysarthria [ ]Paresis [ ]Other   SKIN: See Nursing Skin Assessment for further details  [x ]Normal    [ ]Rash  [ ]Pressure ulcer(s)       Present on admission [ ]y [ ]n   [  ]  Wound    [  ] hyperpigmentation    CRITICAL CARE:  [ ]Shock Present  [ ]Septic [ ]Cardiogenic [ ]Neurologic [ ]Hypovolemic  [ ]Vasopressors [ ]Inotropes  [ ]Respiratory failure present [ ]Mechanical Ventilation [ ]Non-invasive ventilatory support [ ]High-Flow   [ ]Acute  [ ]Chronic [ ]Hypoxic  [ ]Hypercarbic [ ]Other  [ ]Other organ failure     LABS:                          7.9    8.91  )-----------( 413      ( 28 Oct 2022 06:34 )             27.8     10-28    136  |  100  |  19  ----------------------------<  87  4.5   |  27  |  0.87    Ca    10.4      28 Oct 2022 06:34  Phos  3.5     10-28  Mg     2.20     10-28      RADIOLOGY & ADDITIONAL STUDIES: reviewed     Protein Calorie Malnutrition Present: [ ]mild [ ]moderate [x ]severe [ ]underweight [ ]morbid obesity  https://www.andeal.org/vault/2440/web/files/ONC/Table_Clinical%20Characteristics%20to%20Document%20Malnutrition-White%20JV%20et%20al%202012.pdf    Height (cm): 162.6 (10-18-22 @ 18:03), 162.6 (09-19-22 @ 14:23), 162.6 (08-15-22 @ 15:45)  Weight (kg): 38 (10-18-22 @ 18:03), 38.3 (09-19-22 @ 14:23), 38.6 (08-15-22 @ 15:45)  BMI (kg/m2): 14.4 (10-18-22 @ 18:03), 14.5 (09-19-22 @ 14:23), 14.6 (08-15-22 @ 15:45)    [ ]PPSV2 < or = 30%  [ ]significant weight loss [ ]poor nutritional intake [ ]anasarca   [ ]Artificial Nutrition    REFERRALS:   [ ]Chaplaincy  [ ]Hospice  [ ]Child Life  [ ]Social Work  [x ]Case management [ ]Holistic Therapy

## 2022-10-28 NOTE — CHART NOTE - NSCHARTNOTEFT_GEN_A_CORE
R3 Chart Note      56 year old P5, LMP 2019 admitted to medicine for hemoptysis, SOB and pain in the setting of bladder cancer diagnosed in 2020, now metastatic to the lungs and the liver. Patient with 3 weeks of "vaginal bleeding" on and off. At this time pelvic organs unlikely to be the source of patients bleeding     Patient with a large bladder mass appreciated protruding into the vaginal canal anteriorly that is friable. Mass is too large/ obstructive to visualize the cervix on cervix.  However CT scan showing normal uterus and adnexa    -Recommend TVUS however patient uncomfortable with digital exam and unsure if vaginal probe is able to traverse the vaginal canal secondary to obstructing mass   -Patient also unsure if she wants any further exams secondary to her pain   -Follow up recommendations of medical oncology and palliative care   -Consult urology as patient has been following with them since 2019, please see Allscipts notes for further details   - No acute Gyn intervention at this time   -GYN signing off at this time, please reconsult if needed     d/w Dr. Micaela Miramontes, PGY3

## 2022-10-29 NOTE — PROGRESS NOTE ADULT - ASSESSMENT
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      Problem/Plan - 1:  ·  Problem: low back / flank and chest pain.   ·  Plan: acute on chronic, involving both hips, with left worse than right. Affecting mobility. Not controlled by Tramadol and Tylenol.   CT scan of abdomen and pelvis: no evidence of osseous metastatic disease.  discussion as above       chest pain after taking morphine is not typical.. pain is not cardiac: likely from gas as pt with constipation >> bowel regimen given ... ) :  bowel regimen and simethiocone as ordered : encouraged   increase ambulation  as able   palliative follow up and mgmt  appreciated   bowel regimen for constipation   anti gas meds reordered     Problem/Plan - 2:  ·  Problem: Hemoptysis. on and off, small amounts   CT demonstrates: Numerous bilateral pulmonary metastases; mild interval increase in size of a few of the largest pulmonary metastases compared to 9/20/2022.  - Monitor for now  - pt planned for OP folowup with oncology for treatment / chemo vs immunotherapy   -  offered to have V/Q done but pt also declined stating she had it done in recent past and was negative.. ( done in May according to our records)      Problem/Plan - 3:  ·  Problem: Hypoglycemia.   ·  Plan: glucose to 59 via FS; non-diabetic, s/p D-50 in the ED  diet prescribed, along with supplements  - Monitor fingersticks.     Problem/Plan - 4:  ·  Problem: Primary cancer of bladder with metastasis to other site.   ·  Plan: with metastases and has left hydronephrosis  s/p right nephrostomy tube in place draining w/o difficulty  - Oncology consult / follow up ( pt states she will be changing oncologists from [facility], Dr Jones, to [facility] / [facility])    cancer has extended to the vagina causing some bleeding as above     pt declined TVUS and GYN exam before ( probably wise as likely will cause more bleeding)        GYN appreciated           no additional benefit from a  consult     Chronic anemia / anemia of chronic disease     monitor as seems to be declinig..     pt has declined blood transfusion      will discuss with pt / order  IV iron if agreeable      Problem/Plan - 5:  ·  Problem: Slow transit constipation.   ·  Plan: no bowel movement for the past 3 days while on opiate  - Bowel regimen as ordered   - Monitor BM.  - treat constipation : also offered Linzess  but declined; agreed to mineral oil :  sister to buy and bring in      Problem/Plan - 6:  ·  Problem: Protein calorie malnutrition.   ·  Plan: Poor PO intake, confused/frustrated as to what to eat at present. no longer taking MVI; will restart     Problem/Plan - 7:  ·  Problem: Prophylactic measure.   ·  Plan: DVT ppx: will do SCD for now, will switch to Lovenox  no PT needs.    R/O UTI .

## 2022-10-29 NOTE — PROGRESS NOTE ADULT - SUBJECTIVE AND OBJECTIVE BOX
Date of Service  : 10-29-22    INTERVAL HPI/OVERNIGHT EVENTS: I think I may have Urine infection.   Vital Signs Last 24 Hrs  T(C): 37.1 (29 Oct 2022 13:18), Max: 37.1 (29 Oct 2022 13:18)  T(F): 98.7 (29 Oct 2022 13:18), Max: 98.7 (29 Oct 2022 13:18)  HR: 71 (29 Oct 2022 13:18) (68 - 71)  BP: 102/57 (29 Oct 2022 13:18) (98/60 - 106/52)  BP(mean): --  RR: 17 (29 Oct 2022 13:18) (17 - 18)  SpO2: 100% (29 Oct 2022 13:18) (100% - 100%)    Parameters below as of 29 Oct 2022 13:18  Patient On (Oxygen Delivery Method): room air      I&O's Summary    28 Oct 2022 07:01  -  29 Oct 2022 07:00  --------------------------------------------------------  IN: 0 mL / OUT: 360 mL / NET: -360 mL      MEDICATIONS  (STANDING):  acetaminophen     Tablet .. 650 milliGRAM(s) Oral every 6 hours  ascorbic acid 500 milliGRAM(s) Oral daily  chlorhexidine 2% Cloths 1 Application(s) Topical <User Schedule>  cholecalciferol 2000 Unit(s) Oral daily  ferrous    sulfate 325 milliGRAM(s) Oral daily  folic acid 1 milliGRAM(s) Oral daily  influenza   Vaccine 0.5 milliLiter(s) IntraMuscular once  lidocaine   4% Patch 1 Patch Transdermal every 24 hours  morphine   Solution 5 milliGRAM(s) Oral every 4 hours  pantoprazole    Tablet 40 milliGRAM(s) Oral before breakfast  saline laxative (FLEET) Rectal Enema 1 Enema Rectal once  senna 2 Tablet(s) Oral at bedtime    MEDICATIONS  (PRN):  aluminum hydroxide/magnesium hydroxide/simethicone Suspension 30 milliLiter(s) Oral every 4 hours PRN Dyspepsia  bisacodyl 5 milliGRAM(s) Oral daily PRN Constipation  melatonin 3 milliGRAM(s) Oral at bedtime PRN Insomnia  ondansetron Injectable 4 milliGRAM(s) IV Push every 8 hours PRN Nausea and/or Vomiting  simethicone 80 milliGRAM(s) Chew four times a day PRN Gas    LABS:                        8.0    10.04 )-----------( 412      ( 29 Oct 2022 05:41 )             27.8     10-29    137  |  100  |  21  ----------------------------<  94  4.6   |  24  |  0.88    Ca    10.3      29 Oct 2022 05:41  Phos  3.5     10-29  Mg     2.10     10-29          CAPILLARY BLOOD GLUCOSE              REVIEW OF SYSTEMS:  CONSTITUTIONAL: No fever, weight loss, or fatigue  EYES: No eye pain, visual disturbances, or discharge  ENMT:  No difficulty hearing, tinnitus, vertigo; No sinus or throat pain  NECK: No pain or stiffness  RESPIRATORY: No cough, wheezing, chills or hemoptysis; No shortness of breath  CARDIOVASCULAR: No chest pain, palpitations, dizziness, or leg swelling  GASTROINTESTINAL: No abdominal or epigastric pain. No nausea, vomiting, or hematemesis; No diarrhea or constipation. No melena or hematochezia.  GENITOURINARY: No dysuria, frequency, hematuria, or incontinence  NEUROLOGICAL: No headaches, memory loss, loss of strength, numbness, or tremors      Consultant(s) Notes Reviewed:  [x ] YES  [ ] NO    PHYSICAL EXAM:  GENERAL: NAD, cachetic   HEAD:  Atraumatic, Normocephalic  NECK: Supple, No JVD, Normal thyroid  NERVOUS SYSTEM:  Alert & Oriented X3, No focal deficit   CHEST/LUNG: Good air entry bilateral with no  rales, rhonchi, wheezing, or rubs  HEART: Regular rate and rhythm; No murmurs, rubs, or gallops  ABDOMEN: Soft, Nontender, Nondistended; Bowel sounds present  EXTREMITIES:  2+ Peripheral Pulses, No clubbing, cyanosis, or edema    Care Discussed with Consultants/Other Providers [ x] YES  [ ] NO

## 2022-10-30 NOTE — PROGRESS NOTE ADULT - ASSESSMENT
_________________________________________________________________________________________  ========>>  M E D I C A L   A T T E N D I N G    F O L L O W  U P  N O T E  <<=========  -----------------------------------------------------------------------------------------------------    - Patient seen and examined by me earlier today.   - In summary,  JEF HOUSE is a 56y year old woman admitted with back / flank pain  - Patient today overall doing the same, on and off chest pain, no more BM reported still ( pt remains picky on medications )      pain somewhat otherwise controlled, taking morphine at times..       ==================>> REVIEW OF SYSTEM <<=================    GEN: no fever, no chills, on and off pain as above   RESP: no SOB, no cough, no sputum, slight hemoptysis at times   CVS: ++ chest / upper abd pain, no palpitations, no edema  GI: abdominal pain, no nausea, no constipation, no diarrhea  : no dysuria, no frequency, no hematuria in tube or voiding      + occasional chronic vaginal bleeding   Neuro: no headache, no dizziness  Derm : no itching, no rash    ==================>> PHYSICAL EXAM <<=================    GEN: A&O X 3 , NAD , uncomfortable, pleasant, calm , cachectic , pt encouraged to take meds as needed for comfort   HEENT: NCAT, PERRL, MMM, hearing intact  Neck: supple , no JVD appreciated  CVS: S1S2 , regular , No M/R/G appreciated  PULM: CTA B/L,  no W/R/R appreciated  ABD.: soft. non tender, non distended  Extrem: intact pulses , no edema     nephrostomy in place and draining to bag   PSYCH : normal mood,  not anxious                             ( note written / Date of service   10-30-22 )    ==================>> MEDICATIONS <<====================    acetaminophen     Tablet .. 650 milliGRAM(s) Oral every 6 hours  ascorbic acid 500 milliGRAM(s) Oral daily  chlorhexidine 2% Cloths 1 Application(s) Topical <User Schedule>  cholecalciferol 2000 Unit(s) Oral daily  ferrous    sulfate 325 milliGRAM(s) Oral daily  folic acid 1 milliGRAM(s) Oral daily  influenza   Vaccine 0.5 milliLiter(s) IntraMuscular once  iron sucrose IVPB 100 milliGRAM(s) IV Intermittent every 24 hours  lidocaine   4% Patch 1 Patch Transdermal every 24 hours  morphine   Solution 5 milliGRAM(s) Oral every 4 hours  pantoprazole    Tablet 40 milliGRAM(s) Oral before breakfast  saline laxative (FLEET) Rectal Enema 1 Enema Rectal once  senna 2 Tablet(s) Oral at bedtime    MEDICATIONS  (PRN):  aluminum hydroxide/magnesium hydroxide/simethicone Suspension 30 milliLiter(s) Oral every 4 hours PRN Dyspepsia  bisacodyl 5 milliGRAM(s) Oral daily PRN Constipation  melatonin 3 milliGRAM(s) Oral at bedtime PRN Insomnia  ondansetron Injectable 4 milliGRAM(s) IV Push every 8 hours PRN Nausea and/or Vomiting  simethicone 80 milliGRAM(s) Chew four times a day PRN Gas    ___________  Active diet:  Diet, Regular:   High Fiber (HIFIBER)  Supplement Feeding Modality:  Oral  Ensure Plus High Protein Cans or Servings Per Day:  2       Frequency:  Daily  ___________________    ==================>> VITAL SIGNS <<==================     Vital Signs Last 24 HrsT(C): 37.1 (10-30-22 @ 13:09)  T(F): 98.8 (10-30-22 @ 13:09), Max: 98.8 (10-30-22 @ 13:09)  HR: 85 (10-30-22 @ 13:09) (74 - 85)  BP: 105/65 (10-30-22 @ 13:09)  RR: 16 (10-30-22 @ 13:09) (16 - 18)  SpO2: 100% (10-30-22 @ 13:09) (100% - 100%)       ==================>> LAB AND IMAGING <<==================                        8.1    10.27 )-----------( 399      ( 30 Oct 2022 05:30 )             28.4        10-30    136  |  98  |  20  ----------------------------<  84  4.3   |  26  |  0.83    Ca    10.5      30 Oct 2022 05:30  Phos  3.6     10-30  Mg     2.10     10-30      ___________________________________________________________________________________  ===============>>  A S S E S S M E N T   A N D   P L A N <<===============  ------------------------------------------------------------------------------------------    · Assessment	  56 year old female with PMHx of Asthma, Anemia, Bladder cancer with 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     Problem/Plan - 1:  ·  Problem: low back / flank and chest pain.   ·  Plan: acute on chronic, involving both hips, with left worse than right. Affecting mobility. Not controlled by Tramadol and Tylenol.   CT scan of abdomen and pelvis: no evidence of osseous metastatic disease.  discussion as above       chest pain after taking morphine is not typical.. pain is not cardiac: likely from gas as pt with constipation >> bowel regimen given ... ) :  bowel regimen and simethiocone as ordered : encouraged   increase ambulation  as able   palliative follow up and mgmt  appreciated   bowel regimen for constipation   anti gas meds reordered    Problem/Plan - 2:  ·  Problem: Hemoptysis. on and off, small amounts   CT demonstrates: Numerous bilateral pulmonary metastases; mild interval increase in size of a few of the largest pulmonary metastases compared to 9/20/2022.  - Monitor for now  - pt planned for OP folowup with oncology for treatment / chemo vs immunotherapy   -  offered to have V/Q done but pt also declined stating she had it done in recent past and was negative.. ( done in May according to our records)     Problem/Plan - 3:  ·  Problem: Hypoglycemia.   ·  Plan: glucose to 59 via FS; non-diabetic, s/p D-50 in the ED  diet prescribed, along with supplements  - Monitor fingersticks.    Problem/Plan - 4:  ·  Problem: Primary cancer of bladder with metastasis to other site.   ·  Plan: with metastases and has left hydronephrosis  s/p right nephrostomy tube in place draining w/o difficulty  - Oncology consult / follow up ( pt states she will be changing oncologists from [facility], Dr Jones, to [facility] / [facility])    cancer has extended to the vagina causing some bleeding as above     pt declined TVUS and GYN exam before ( probably wise as likely will cause more bleeding)        GYN appreciated           no additional benefit from a  consult at this time     Chronic anemia / anemia of chronic disease     monitor as seems to be declinig..     pt has declined blood transfusion      pt declined IV iron before but willing to try today      Problem/Plan - 5:  ·  Problem: Slow transit constipation.   ·  Plan: no bowel movement for the past 3 days while on opiate  - Bowel regimen as ordered   - Monitor BM.  - treat constipation : also offered Linzess  but declined; agreed to mineral oil :  sister to buy and bring in     Problem/Plan - 6:  ·  Problem: Protein calorie malnutrition.   ·  Plan: Poor PO intake, confused/frustrated as to what to eat at present. no longer taking MVI; will restart    Problem/Plan - 7:  ·  Problem: Prophylactic measure.   ·  Plan: DVT ppx: will do SCD for now, will switch to Lovenox  no PT needs.    discharge planing when pain better controlled     --------------------------------------------  Case discussed with pt,   Education given on findings and plan of care  ___________________________  H. NIEVES Ochoa.  Pager: [phone]

## 2022-10-30 NOTE — PROVIDER CONTACT NOTE (MEDICATION) - ACTION/TREATMENT ORDERED:
ACP aware of situation, recommends marking medication not done as pt refused despite education, will edelmira Benadryl as not done as well, continue to encourage constipation relief options

## 2022-10-30 NOTE — PROVIDER CONTACT NOTE (MEDICATION) - SITUATION
Pt wants to start IV iron sucrose tomorrow instead of today, despite education pt states it will make her more constipation

## 2022-10-30 NOTE — PROVIDER CONTACT NOTE (MEDICATION) - ASSESSMENT
IV benadryl was ordered at pt request to be given pre IV iron sucrose, and agreed to run the dose slow - now pt refusing and wants to begin tomorrow and focus on constipation issue  pt still not agreeable to suppository or enema for relief

## 2022-10-31 NOTE — PROGRESS NOTE ADULT - ASSESSMENT
56f with bladder cancer, [place] [facility] [place] [facility], she appears to have developed hematuria in 2019 with a negative subsequent cystoscopy and PET/CT per patient previously. Then in 2020, she was found to have a bladder mass which was biopsied and initially [place] with [place] [facility] [place] then discontinued due to pyelonephritis. She switched oncologist and currently followed with Dr Hoyt at [facility]. She was most recently on carboplatin/gemcitabine, with last dose given in May 2022 (unable to get treatment due to recurrent hospitalizations since), she recently switched oncologist again and saw Dr Alcocer at [facility], she is hoping to transfer care to Dr Kan, she is presenting for pain, has had hemoptysis as well     CT chest/a/p  IMPRESSION:  1.  Large bladder mass is without significant change. Severe chronic   hydronephrosis of the left kidney is unchanged. Right nephrostomy tube in   place; no right hydronephrosis.  2.  Numerous bilateral pulmonary metastases; mild interval increase in   size of a few of the largest pulmonary metastases compared to 9/20/2022.  3.  Progression of liver metastases.  4.  No evidence of osseous metastatic disease.    -Pal care input appreciated  -Has been off treatment, established care at [facility] for follow up. Will need outpatient evaluation for assessment of performance status and candidacy for further chemo or immunotherapy. Records have been requested for review at [facility].  -No hemoptysis since admission, monitor  - Now being evaluated for vaginal bleeding, appreciate GYN recs  -Would consider Urology consult  -Encouraged patient to take IV iron in the setting of vaginal bleeding  -Rest of care as per primary team  -Patient to followup with DR Kan @ [facility]  -Supportive care, pain control, Nutrition, PT, DVT ppx  -Oncology will continue to follow with you      Case discussed with Dr. Anil TINOCO  Oncology Physician Assistant  [facility] JORGE/JONATHAN [facility]  Pager [phone] also available on Teams    If before 8am/after 5pm or on weekends please page On-call Oncology Fellow

## 2022-10-31 NOTE — PROGRESS NOTE ADULT - SUBJECTIVE AND OBJECTIVE BOX
SUBJECTIVE AND OBJECTIVE:  Pt with uncontrolled pain, indicates that L hip pain has flared  Morphine taking care of pain in her back but not the hip pain    INTERVAL HPI/OVERNIGHT EVENTS:  Pt does not have any PRNs ordered    Allergies    No Known Allergies    Intolerances    MEDICATIONS  (STANDING):  acetaminophen     Tablet .. 650 milliGRAM(s) Oral every 6 hours  bisacodyl 5 milliGRAM(s) Oral at bedtime  chlorhexidine 2% Cloths 1 Application(s) Topical <User Schedule>  cholecalciferol 2000 Unit(s) Oral daily  diphenhydrAMINE Injectable 25 milliGRAM(s) IV Push once  folic acid 1 milliGRAM(s) Oral daily  influenza   Vaccine 0.5 milliLiter(s) IntraMuscular once  iron sucrose IVPB 100 milliGRAM(s) IV Intermittent every 24 hours  lidocaine   4% Patch 1 Patch Transdermal every 24 hours  morphine   Solution 5 milliGRAM(s) Oral every 4 hours  pantoprazole    Tablet 40 milliGRAM(s) Oral before breakfast  polyethylene glycol 3350 17 Gram(s) Oral daily  saline laxative (FLEET) Rectal Enema 1 Enema Rectal once  senna 2 Tablet(s) Oral at bedtime    MEDICATIONS  (PRN):  aluminum hydroxide/magnesium hydroxide/simethicone Suspension 30 milliLiter(s) Oral every 4 hours PRN Dyspepsia  bisacodyl Suppository 10 milliGRAM(s) Rectal daily PRN Constipation  cyclobenzaprine 5 milliGRAM(s) Oral three times a day PRN Muscle Spasm  melatonin 3 milliGRAM(s) Oral at bedtime PRN Insomnia  morphine   Solution 7.5 milliGRAM(s) Oral every 3 hours PRN Breakthrough pain  ondansetron Injectable 4 milliGRAM(s) IV Push every 8 hours PRN Nausea and/or Vomiting  simethicone 80 milliGRAM(s) Chew four times a day PRN Gas    ITEMS UNCHECKED ARE NOT PRESENT    PRESENT SYMPTOMS: [ ]Unable to self-report due to altered mental status- see [ ] CPOT [ ] PAINADS [ ] RDOS  Source if other than patient:  [ ]Family   [ ]Team     Pain: [x ]yes [ ]no  QOL impact - moderate  Location -    left chest and now L hip             Aggravating factors  - movement   Quality - sharp  Radiation - right back and left later side   Timing- intermittent   Severity (0-10 scale): 9  Minimal acceptable level (0-10 scale): 2    Dyspnea:                           [ ]Mild [ ]Moderate [ ]Severe  Anxiety:                             [x ]Mild [ ]Moderate [ ]Severe  Agitation:                          [ ]Mild [ ]Moderate [ ]Severe  Fatigue:                             [ ]Mild [ ]Moderate [ ]Severe  Nausea:                             [ ]Mild [ ]Moderate [ ]Severe  Loss of appetite:              [ ]Mild [ ]Moderate [ ]Severe  Constipation:                   [ ]Mild [ ]Moderate [ ]Severe  Diarrhea:                          [ ]Mild [ ]Moderate [ ]Severe    CPOT:    https://www.[facility].org/getattachment/[email or web address]/Critical-Care-Pain-Observation-Tool-(CPOT)    PCSSQ[Palliative Care Spiritual Screening Question]   Severity (0-10): 5  Score of 4 or > indicate consideration of Chaplaincy referral.  Chaplaincy Referral: [x ] yes [ ] refused [ ] following [ ] deferred    Caregiver [place]? : [ ] yes [ ] no [ ] Declined [x ] Deferred              Social work referral [ ] Patient & Family Centered Care Referral [ ]     Anticipatory Grief present?:  [ ] yes [ ] no  [ x] Deferred                  Social work referral [ ] Chaplaincy Referral[ ]    Other Symptoms:  [x ]All other review of systems negative     PHYSICAL EXAM:  Vital Signs Last 24 Hrs  T(C): 36.6 (31 Oct 2022 13:42), Max: 36.7 (31 Oct 2022 05:23)  T(F): 97.8 (31 Oct 2022 13:42), Max: 98.1 (31 Oct 2022 05:23)  HR: 65 (31 Oct 2022 13:42) (65 - 66)  BP: 103/50 (31 Oct 2022 13:42) (103/50 - 119/60)  BP(mean): --  RR: 16 (31 Oct 2022 13:42) (16 - 17)  SpO2: 99% (31 Oct 2022 13:42) (95% - 100%)    Parameters below as of 31 Oct 2022 13:42  Patient On (Oxygen Delivery Method): room air     I&O's Summary    30 Oct 2022 07:01  -  31 Oct 2022 07:00  --------------------------------------------------------  IN: 0 mL / OUT: 350 mL / NET: -350 mL    GENERAL:  [ x]Alert  [x ]Oriented x   [ ]Lethargic  [ ]Cachexia  [ ]Unarousable  [ x]Verbal  [ ]Non-Verbal    Behavioral:   [x ] Anxiety  [ ] Delirium [ ] Agitation [ ] Calm     HEENT:  [ x]Normal  [ ] Temporal Wasting  [ ]Dry mouth   [ ]ET Tube/Trach  [ ]Oral lesions  [ ] Mucositis  PULMONARY:   [x ]Clear [ ]Tachypnea  [ ]Audible excessive secretions   [ ]Rhonchi        [ ]Right [ ]Left [ ]Bilateral  [ ]Crackles        [ ]Right [ ]Left [ ]Bilateral  [ ]Wheezing     [ ]Right [ ]Left [ ]Bilateral  [ ]Diminished breath sounds [ ]right [ ]left [ ]bilateral  CARDIOVASCULAR:    [x ]Regular [ ]Irregular [ ]Tachy  [ ]Bradley [ ]Murmur [ ]Other  GASTROINTESTINAL:  [x ]Soft  [ ]Distended   [ ]+BS  [ x]Non tender [ ]Tender  [ ]PEG [ ]OGT/ NGT  Last BM: 10/30   GENITOURINARY: Nephrostomy tube   [ ]Normal [ ] Incontinent   [ ]Oliguria/Anuria   [ ]Lloyd  MUSCULOSKELETAL:   [ x]Normal   [ ]Weakness  [ ]Bed/Wheelchair bound [ ]Edema  [  ] amputation  [  ] contraction  NEUROLOGIC:   [ x]No focal deficits  [ ]Cognitive impairment  [ ]Dysphagia [ ]Dysarthria [ ]Paresis [ ]Other   SKIN: See Nursing Skin Assessment for further details  [x ]Normal    [ ]Rash  [ ]Pressure ulcer(s)       Present on admission [ ]y [ ]n   [  ]  Wound    [  ] hyperpigmentation    CRITICAL CARE:  [ ]Shock Present  [ ]Septic [ ]Cardiogenic [ ]Neurologic [ ]Hypovolemic  [ ]Vasopressors [ ]Inotropes  [ ]Respiratory failure present [ ]Mechanical Ventilation [ ]Non-invasive ventilatory support [ ]High-Flow   [ ]Acute  [ ]Chronic [ ]Hypoxic  [ ]Hypercarbic [ ]Other  [ ]Other organ failure     LABS:                          7.9    8.91  )-----------( 413      ( 28 Oct 2022 06:34 )             27.8     10-28    136  |  100  |  19  ----------------------------<  87  4.5   |  27  |  0.87    Ca    10.4      28 Oct 2022 06:34  Phos  3.5     10-28  Mg     2.20     10-28    RADIOLOGY & ADDITIONAL STUDIES: reviewed     Protein Calorie Malnutrition Present: [ ]mild [ ]moderate [x ]severe [ ]underweight [ ]morbid obesity  https://www.andeal.org/vault/5360/web/files/ONC/Table_Clinical%20Characteristics%20to%20Document%20Malnutrition-White%20JV%20et%20al%202012.pdf    Height (cm): 162.6 (10-18-22 @ 18:03), 162.6 (09-19-22 @ 14:23), 162.6 (08-15-22 @ 15:45)  Weight (kg): 38 (10-18-22 @ 18:03), 38.3 (09-19-22 @ 14:23), 38.6 (08-15-22 @ 15:45)  BMI (kg/m2): 14.4 (10-18-22 @ 18:03), 14.5 (09-19-22 @ 14:23), 14.6 (08-15-22 @ 15:45)    [ ]PPSV2 < or = 30%  [ ]significant weight loss [ ]poor nutritional intake [ ]anasarca   [ ]Artificial Nutrition    REFERRALS:   [ ]Chaplaincy  [ ]Hospice  [ ]Child Life  [ ]Social Work  [x ]Case management [ ]Holistic Therapy

## 2022-10-31 NOTE — PROGRESS NOTE ADULT - SUBJECTIVE AND OBJECTIVE BOX
INTERVAL HPI/OVERNIGHT EVENTS:  Patient seen at bedside.  Patient with continued abdominal pain  Takes morphine but cautious since she then gets constipated which   provokes her vaginal bleeding  Patient taking laxatives and stool softners for constipation    VITAL SIGNS:  T(F): 98.1 (10-31-22 @ 05:23)  HR: 66 (10-31-22 @ 05:23)  BP: 111/50 (10-31-22 @ 05:23)  RR: 16 (10-31-22 @ 05:23)  SpO2: 100% (10-31-22 @ 05:23)  Wt(kg): --    PHYSICAL EXAM:  GEN: A&O X 3 , NAD , uncomfortable, pleasant, calm , cachectic ,   pt encouraged to take meds as needed for comfort   HEENT: NCAT, PERRL, MMM, hearing intact  Neck: supple , no JVD appreciated  CVS: S1S2 , regular , No M/R/G appreciated  PULM: CTA B/L,  no W/R/R appreciated  ABD.: soft. non tender, non distended  Extrem: intact pulses , no edema     nephrostomy in place and draining to bag   PSYCH : normal mood,  not anxious    MEDICATIONS  (STANDING):  acetaminophen     Tablet .. 650 milliGRAM(s) Oral every 6 hours  bisacodyl 5 milliGRAM(s) Oral at bedtime  chlorhexidine 2% Cloths 1 Application(s) Topical <User Schedule>  cholecalciferol 2000 Unit(s) Oral daily  folic acid 1 milliGRAM(s) Oral daily  influenza   Vaccine 0.5 milliLiter(s) IntraMuscular once  iron sucrose IVPB 100 milliGRAM(s) IV Intermittent every 24 hours  lidocaine   4% Patch 1 Patch Transdermal every 24 hours  morphine   Solution 5 milliGRAM(s) Oral every 4 hours  pantoprazole    Tablet 40 milliGRAM(s) Oral before breakfast  polyethylene glycol 3350 17 Gram(s) Oral daily  saline laxative (FLEET) Rectal Enema 1 Enema Rectal once  senna 2 Tablet(s) Oral at bedtime    MEDICATIONS  (PRN):  aluminum hydroxide/magnesium hydroxide/simethicone Suspension 30 milliLiter(s) Oral every 4 hours PRN Dyspepsia  bisacodyl Suppository 10 milliGRAM(s) Rectal daily PRN Constipation  cyclobenzaprine 5 milliGRAM(s) Oral three times a day PRN Muscle Spasm  melatonin 3 milliGRAM(s) Oral at bedtime PRN Insomnia  morphine   Solution 7.5 milliGRAM(s) Oral every 3 hours PRN Breakthrough pain  ondansetron Injectable 4 milliGRAM(s) IV Push every 8 hours PRN Nausea and/or Vomiting  simethicone 80 milliGRAM(s) Chew four times a day PRN Gas      Allergies    No Known Allergies    Intolerances        LABS:                        7.7    9.59  )-----------( 382      ( 31 Oct 2022 05:18 )             26.5     10-31    136  |  100  |  18  ----------------------------<  87  4.2   |  28  |  0.86    Ca    10.0      31 Oct 2022 05:18  Phos  3.3     10-31  Mg     2.10     10-31        Urinalysis Basic - ( 29 Oct 2022 18:00 )    Color: Dark Brown / Appearance: Turbid / S.030 / pH: x  Gluc: x / Ketone: Negative  / Bili: Negative / Urobili: <2 mg/dL   Blood: x / Protein: 300 mg/dL / Nitrite: Negative   Leuk Esterase: Large / RBC: >720 /HPF / WBC >720 /HPF   Sq Epi: x / Non Sq Epi: >27 /HPF / Bacteria: Few        RADIOLOGY & ADDITIONAL TESTS:  Studies reviewed.

## 2022-10-31 NOTE — PROGRESS NOTE ADULT - ASSESSMENT
( Note written / Date of service 10-31-22 )    ==================>> MEDICATIONS <<====================    acetaminophen     Tablet .. 650 milliGRAM(s) Oral every 6 hours  bisacodyl 5 milliGRAM(s) Oral at bedtime  chlorhexidine 2% Cloths 1 Application(s) Topical <User Schedule>  cholecalciferol 2000 Unit(s) Oral daily  folic acid 1 milliGRAM(s) Oral daily  influenza   Vaccine 0.5 milliLiter(s) IntraMuscular once  iron sucrose IVPB 100 milliGRAM(s) IV Intermittent every 24 hours  lidocaine   4% Patch 1 Patch Transdermal every 24 hours  morphine   Solution 5 milliGRAM(s) Oral every 4 hours  pantoprazole    Tablet 40 milliGRAM(s) Oral before breakfast  polyethylene glycol 3350 17 Gram(s) Oral daily  saline laxative (FLEET) Rectal Enema 1 Enema Rectal once  senna 2 Tablet(s) Oral at bedtime    MEDICATIONS  (PRN):  aluminum hydroxide/magnesium hydroxide/simethicone Suspension 30 milliLiter(s) Oral every 4 hours PRN Dyspepsia  bisacodyl Suppository 10 milliGRAM(s) Rectal daily PRN Constipation  cyclobenzaprine 5 milliGRAM(s) Oral three times a day PRN Muscle Spasm  melatonin 3 milliGRAM(s) Oral at bedtime PRN Insomnia  morphine   Solution 7.5 milliGRAM(s) Oral every 3 hours PRN Breakthrough pain  ondansetron Injectable 4 milliGRAM(s) IV Push every 8 hours PRN Nausea and/or Vomiting  simethicone 80 milliGRAM(s) Chew four times a day PRN Gas    ___________  Active diet:  Diet, Regular:   High Fiber (HIFIBER)  Supplement Feeding Modality:  Oral  Ensure Plus High Protein Cans or Servings Per Day:  2       Frequency:  Daily  ___________________    ==================>> VITAL SIGNS <<==================    Vital Signs Last 24 HrsT(C): 36.6 (10-31-22 @ 13:42)  T(F): 97.8 (10-31-22 @ 13:42), Max: 98.1 (10-31-22 @ 05:23)  HR: 65 (10-31-22 @ 13:42) (65 - 66)  BP: 103/50 (10-31-22 @ 13:42)  RR: 16 (10-31-22 @ 13:42) (16 - 17)  SpO2: 99% (10-31-22 @ 13:42) (95% - 100%)      CAPILLARY BLOOD GLUCOSE         ==================>> LAB AND IMAGING <<==================                        7.7    9.59  )-----------( 382      ( 31 Oct 2022 05:18 )             26.5        10-31    136  |  100  |  18  ----------------------------<  87  4.2   |  28  |  0.86    Ca    10.0      31 Oct 2022 05:18  Phos  3.3     10-31  Mg     2.10     10-31      WBC count:   9.59 <<== ,  10.27 <<== ,  10.04 <<== ,  8.91 <<== ,  8.48 <<==   Hemoglobin:   7.7 <<==,  8.1 <<==,  8.0 <<==,  7.9 <<==,  7.6 <<==  platelets:  382 <==, 399 <==, 412 <==, 413 <==, 389 <==, 343 <==, 331 <==    Creatinine:  0.86  <<==, 0.83  <<==, 0.88  <<==, 0.87  <<==, 0.88  <<==, 0.91  <<==  Sodium:   136  <==, 136  <==, 137  <==, 136  <==, 139  <==, 134  <==       AST:               ALT:             AP:             Bili:            ____________________________    M I C R O B I O L O G Y :      COVID-19 PCR: NotDetec (10-30-22 @ 09:38)  SARS-CoV-2: NotDetec (10-17-22 @ 21:31)  COVID-19 PCR: NotDetec (10-14-22 @ 06:00)     _________________________________________________________________________________________  ========>>  M E D I C A L   A T T E N D I N G    F O L L O W  U P  N O T E  <<=========  -----------------------------------------------------------------------------------------------------    - Patient seen and examined by me earlier today.   - In summary,  JEF HOUSE is a 56y year old woman admitted with back / flank pain  - Patient today overall doing the same, on and off chest pain, had a small hard BM yesterday ( pt remains picky on medications , trying to micromanage everything despite extensive educaiotn)      pain somewhat otherwise controlled, taking morphine at times..   > increased per palliative team     ==================>> REVIEW OF SYSTEM <<=================    GEN: no fever, no chills, on and off pain as above   RESP: no SOB, no cough, no sputum, slight hemoptysis at times   CVS: ++ chest / upper abd pain, no palpitations, no edema  GI: abdominal pain, no nausea, constipation as above   : no dysuria, no frequency, no hematuria in tube or voiding      + occasional chronic vaginal bleeding   Neuro: no headache, no dizziness  Derm : no itching, no rash    ==================>> PHYSICAL EXAM <<=================    GEN: A&O X 3 , NAD , uncomfortable, pleasant, calm , cachectic , pt encouraged to take meds as needed for comfort   HEENT: NCAT, PERRL, MMM, hearing intact  Neck: supple , no JVD appreciated  CVS: S1S2 , regular , No M/R/G appreciated  PULM: CTA B/L,  no W/R/R appreciated  ABD.: soft. non tender, non distended  Extrem: intact pulses , no edema     nephrostomy in place and draining to bag   PSYCH : normal mood,  not anxious                             ( Note written / Date of service 10-31-22 )    ==================>> MEDICATIONS <<====================    acetaminophen     Tablet .. 650 milliGRAM(s) Oral every 6 hours  bisacodyl 5 milliGRAM(s) Oral at bedtime  chlorhexidine 2% Cloths 1 Application(s) Topical <User Schedule>  cholecalciferol 2000 Unit(s) Oral daily  folic acid 1 milliGRAM(s) Oral daily  influenza   Vaccine 0.5 milliLiter(s) IntraMuscular once  iron sucrose IVPB 100 milliGRAM(s) IV Intermittent every 24 hours  lidocaine   4% Patch 1 Patch Transdermal every 24 hours  morphine   Solution 5 milliGRAM(s) Oral every 4 hours  pantoprazole    Tablet 40 milliGRAM(s) Oral before breakfast  polyethylene glycol 3350 17 Gram(s) Oral daily  saline laxative (FLEET) Rectal Enema 1 Enema Rectal once  senna 2 Tablet(s) Oral at bedtime    MEDICATIONS  (PRN):  aluminum hydroxide/magnesium hydroxide/simethicone Suspension 30 milliLiter(s) Oral every 4 hours PRN Dyspepsia  bisacodyl Suppository 10 milliGRAM(s) Rectal daily PRN Constipation  cyclobenzaprine 5 milliGRAM(s) Oral three times a day PRN Muscle Spasm  melatonin 3 milliGRAM(s) Oral at bedtime PRN Insomnia  morphine   Solution 7.5 milliGRAM(s) Oral every 3 hours PRN Breakthrough pain  ondansetron Injectable 4 milliGRAM(s) IV Push every 8 hours PRN Nausea and/or Vomiting  simethicone 80 milliGRAM(s) Chew four times a day PRN Gas    ___________  Active diet:  Diet, Regular:   High Fiber (HIFIBER)  Supplement Feeding Modality:  Oral  Ensure Plus High Protein Cans or Servings Per Day:  2       Frequency:  Daily  ___________________    ==================>> VITAL SIGNS <<==================    Vital Signs Last 24 HrsT(C): 36.6 (10-31-22 @ 13:42)  T(F): 97.8 (10-31-22 @ 13:42), Max: 98.1 (10-31-22 @ 05:23)  HR: 65 (10-31-22 @ 13:42) (65 - 66)  BP: 103/50 (10-31-22 @ 13:42)  RR: 16 (10-31-22 @ 13:42) (16 - 17)  SpO2: 99% (10-31-22 @ 13:42) (95% - 100%)       ==================>> LAB AND IMAGING <<==================                        7.7    9.59  )-----------( 382      ( 31 Oct 2022 05:18 )             26.5        10-31    136  |  100  |  18  ----------------------------<  87  4.2   |  28  |  0.86    Ca    10.0      31 Oct 2022 05:18  Phos  3.3     10-31  Mg     2.10     10-31    WBC count:   9.59 <<== ,  10.27 <<== ,  10.04 <<== ,  8.91 <<== ,  8.48 <<==   Hemoglobin:   7.7 <<==,  8.1 <<==,  8.0 <<==,  7.9 <<==,  7.6 <<==  platelets:  382 <==, 399 <==, 412 <==, 413 <==, 389 <==, 343 <==, 331 <==    Creatinine:  0.86  <<==, 0.83  <<==, 0.88  <<==, 0.87  <<==, 0.88  <<==, 0.91  <<==  Sodium:   136  <==, 136  <==, 137  <==, 136  <==, 139  <==, 134  <==      ___________________________________________________________________________________  ===============>>  A S S E S S M E N T   A N D   P L A N <<===============  ------------------------------------------------------------------------------------------    · Assessment	  56 year old female with PMHx of Asthma, Anemia, Bladder cancer with 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     Problem/Plan - 1:  ·  Problem: low back / flank and chest pain.   ·  Plan: acute on chronic, involving both hips, with left worse than right. Affecting mobility. Not controlled by Tramadol and Tylenol.   CT scan of abdomen and pelvis: no evidence of osseous metastatic disease.  discussion as above       chest pain after taking morphine is not typical.. pain is not cardiac: likely from gas as pt with constipation >> bowel regimen given ... ) :  bowel regimen and simethicone as ordered : encouraged   increase ambulation  as able   palliative follow up and mgmt  appreciated   bowel regimen for constipation   anti gas meds reordered    Problem/Plan - 2:  ·  Problem: Hemoptysis. on and off, small amounts   CT demonstrates: Numerous bilateral pulmonary metastases; mild interval increase in size of a few of the largest pulmonary metastases compared to 9/20/2022.  - Monitor for now  - pt planned for OP folowup with oncology for treatment / chemo vs immunotherapy   -  offered to have V/Q done but pt also declined stating she had it done in recent past and was negative.. ( done in May according to our records)     Problem/Plan - 3:  ·  Problem: Hypoglycemia.   ·  Plan: glucose to 59 via FS; non-diabetic, s/p D-50 in the ED  diet prescribed, along with supplements  - Monitor fingersticks.    Problem/Plan - 4:  ·  Problem: Primary cancer of bladder with metastasis to other site.   ·  Plan: with metastases and has left hydronephrosis  s/p right nephrostomy tube in place draining w/o difficulty  - Oncology consult / follow up ( pt states she will be changing oncologists from [facility], Dr Jones, to [facility])    cancer has extended to the vagina causing some bleeding as above     pt declined TVUS and GYN exam before ( probably wise as likely will cause more bleeding)        GYN appreciated           no additional benefit from a  consult at this time     Chronic anemia / anemia of chronic disease     monitor as seems to be declinig..     pt has declined blood transfusion      pt declined IV iron yesterday but taking today      Problem/Plan - 5:  ·  Problem: Slow transit constipation.   ·  Plan: no bowel movement for the past 3 days while on opiate  - Bowel regimen as ordered   - Monitor BM.  - treat constipation : also offered Linzess  but declined; agreed to mineral oil :  sister to buy and bring in     Problem/Plan - 6:  ·  Problem: Protein calorie malnutrition.   ·  Plan: Poor PO intake, confused/frustrated as to what to eat at present. no longer taking MVI; will restart    Problem/Plan - 7:  ·  Problem: Prophylactic measure.   ·  Plan: DVT ppx: will do SCD for now, will switch to Lovenox  no PT needs.    discharge planing when pain better controlled     --------------------------------------------  Case discussed with pt,   Education given on findings and plan of care  ___________________________  H. NIEVES Ochoa.  Pager: [phone]

## 2022-11-01 NOTE — PROGRESS NOTE ADULT - ASSESSMENT
( Note written / Date of service 11-01-22 )    ==================>> MEDICATIONS <<====================    acetaminophen     Tablet .. 650 milliGRAM(s) Oral every 6 hours  bisacodyl 5 milliGRAM(s) Oral at bedtime  chlorhexidine 2% Cloths 1 Application(s) Topical <User Schedule>  cholecalciferol 2000 Unit(s) Oral daily  diphenhydrAMINE Injectable 25 milliGRAM(s) IV Push once  folic acid 1 milliGRAM(s) Oral daily  influenza   Vaccine 0.5 milliLiter(s) IntraMuscular once  iron sucrose IVPB 100 milliGRAM(s) IV Intermittent every 24 hours  lidocaine   4% Patch 1 Patch Transdermal every 24 hours  morphine   Solution 5 milliGRAM(s) Oral every 4 hours  polyethylene glycol 3350 17 Gram(s) Oral daily  saline laxative (FLEET) Rectal Enema 1 Enema Rectal once  senna 2 Tablet(s) Oral at bedtime    MEDICATIONS  (PRN):  aluminum hydroxide/magnesium hydroxide/simethicone Suspension 30 milliLiter(s) Oral every 4 hours PRN Dyspepsia  bisacodyl Suppository 10 milliGRAM(s) Rectal daily PRN Constipation  cyclobenzaprine 5 milliGRAM(s) Oral three times a day PRN Muscle Spasm  melatonin 3 milliGRAM(s) Oral at bedtime PRN Insomnia  morphine   Solution 7.5 milliGRAM(s) Oral every 3 hours PRN Breakthrough pain  ondansetron Injectable 4 milliGRAM(s) IV Push every 8 hours PRN Nausea and/or Vomiting  simethicone 80 milliGRAM(s) Chew four times a day PRN Gas    ___________  Active diet:  Diet, Regular:   High Fiber (HIFIBER)  Supplement Feeding Modality:  Oral  Ensure Plus High Protein Cans or Servings Per Day:  2       Frequency:  Daily  ___________________    ==================>> VITAL SIGNS <<==================    Vital Signs Last 24 HrsT(C): 36.9 (11-01-22 @ 12:14)  T(F): 98.5 (11-01-22 @ 12:14), Max: 98.5 (11-01-22 @ 07:08)  HR: 72 (11-01-22 @ 12:14) (72 - 78)  BP: 107/60 (11-01-22 @ 12:14)  RR: 16 (11-01-22 @ 12:14) (16 - 17)  SpO2: 100% (11-01-22 @ 12:14) (100% - 100%)      CAPILLARY BLOOD GLUCOSE         ==================>> LAB AND IMAGING <<==================                        7.7    9.59  )-----------( 382      ( 31 Oct 2022 05:18 )             26.5        10-31    136  |  100  |  18  ----------------------------<  87  4.2   |  28  |  0.86    Ca    10.0      31 Oct 2022 05:18  Phos  3.3     10-31  Mg     2.10     10-31      WBC count:   9.59 <<== ,  10.27 <<== ,  10.04 <<== ,  8.91 <<==   Hemoglobin:   7.7 <<==,  8.1 <<==,  8.0 <<==,  7.9 <<==  platelets:  382 <==, 399 <==, 412 <==, 413 <==, 389 <==    Creatinine:  0.86  <<==, 0.83  <<==, 0.88  <<==, 0.87  <<==, 0.88  <<==  Sodium:   136  <==, 136  <==, 137  <==, 136  <==, 139  <==       AST:               ALT:             AP:             Bili:            ____________________________    M I C R O B I O L O G Y :      COVID-19 PCR: NotDetec (10-30-22 @ 09:38)  SARS-CoV-2: NotDetec (10-17-22 @ 21:31)  COVID-19 PCR: NotDetec (10-14-22 @ 06:00)     _________________________________________________________________________________________  ========>>  M E D I C A L   A T T E N D I N G    F O L L O W  U P  N O T E  <<=========  -----------------------------------------------------------------------------------------------------    - Patient seen and examined by me earlier today.   - In summary,  JEF HOUSE is a 56y year old woman admitted with back / flank pain  - Patient today overall doing the same, no mention of chest pain, but having pain in back and flank, taking morphine on and off,... still very constipated but not accepting several options presented     ==================>> REVIEW OF SYSTEM <<=================    GEN: no fever, no chills, on and off pain as above   RESP: no SOB, no cough, no sputum, slight hemoptysis at times   CVS: ++ chest / upper abd pain, no palpitations, no edema  GI: abdominal pain, no nausea, constipation as above   : no dysuria, no frequency, no hematuria in tube or voiding      + occasional chronic vaginal bleeding   Neuro: no headache, no dizziness  Derm : no itching, no rash    ==================>> PHYSICAL EXAM <<=================    GEN: A&O X 3 , NAD , uncomfortable, pleasant, calm , cachectic , pt encouraged to take meds as needed for comfort   HEENT: NCAT, PERRL, MMM, hearing intact  Neck: supple , no JVD appreciated  CVS: S1S2 , regular , No M/R/G appreciated  PULM: CTA B/L,  no W/R/R appreciated  ABD.: soft. non tender, non distended... encouraged pt to take bowel regimen   Extrem: intact pulses , no edema     nephrostomy in place and draining to bag   PSYCH : normal mood,  not anxious                             ( Note written / Date of service 11-01-22 )    ==================>> MEDICATIONS <<====================    acetaminophen     Tablet .. 650 milliGRAM(s) Oral every 6 hours  bisacodyl 5 milliGRAM(s) Oral at bedtime  chlorhexidine 2% Cloths 1 Application(s) Topical <User Schedule>  cholecalciferol 2000 Unit(s) Oral daily  diphenhydrAMINE Injectable 25 milliGRAM(s) IV Push once  folic acid 1 milliGRAM(s) Oral daily  influenza   Vaccine 0.5 milliLiter(s) IntraMuscular once  iron sucrose IVPB 100 milliGRAM(s) IV Intermittent every 24 hours  lidocaine   4% Patch 1 Patch Transdermal every 24 hours  morphine   Solution 5 milliGRAM(s) Oral every 4 hours  polyethylene glycol 3350 17 Gram(s) Oral daily  saline laxative (FLEET) Rectal Enema 1 Enema Rectal once  senna 2 Tablet(s) Oral at bedtime    MEDICATIONS  (PRN):  aluminum hydroxide/magnesium hydroxide/simethicone Suspension 30 milliLiter(s) Oral every 4 hours PRN Dyspepsia  bisacodyl Suppository 10 milliGRAM(s) Rectal daily PRN Constipation  cyclobenzaprine 5 milliGRAM(s) Oral three times a day PRN Muscle Spasm  melatonin 3 milliGRAM(s) Oral at bedtime PRN Insomnia  morphine   Solution 7.5 milliGRAM(s) Oral every 3 hours PRN Breakthrough pain  ondansetron Injectable 4 milliGRAM(s) IV Push every 8 hours PRN Nausea and/or Vomiting  simethicone 80 milliGRAM(s) Chew four times a day PRN Gas    ___________  Active diet:  Diet, Regular:   High Fiber (HIFIBER)  Supplement Feeding Modality:  Oral  Ensure Plus High Protein Cans or Servings Per Day:  2       Frequency:  Daily  ___________________    ==================>> VITAL SIGNS <<==================    Vital Signs Last 24 HrsT(C): 36.9 (11-01-22 @ 12:14)  T(F): 98.5 (11-01-22 @ 12:14), Max: 98.5 (11-01-22 @ 07:08)  HR: 72 (11-01-22 @ 12:14) (72 - 78)  BP: 107/60 (11-01-22 @ 12:14)  RR: 16 (11-01-22 @ 12:14) (16 - 17)  SpO2: 100% (11-01-22 @ 12:14) (100% - 100%)       ==================>> LAB AND IMAGING <<==================                        7.7    9.59  )-----------( 382      ( 31 Oct 2022 05:18 )             26.5        10-31    136  |  100  |  18  ----------------------------<  87  4.2   |  28  |  0.86    Ca    10.0      31 Oct 2022 05:18  Phos  3.3     10-31  Mg     2.10     10-31      WBC count:   9.59 <<== ,  10.27 <<== ,  10.04 <<== ,  8.91 <<==   Hemoglobin:   7.7 <<==,  8.1 <<==,  8.0 <<==,  7.9 <<==  platelets:  382 <==, 399 <==, 412 <==, 413 <==, 389 <==    Creatinine:  0.86  <<==, 0.83  <<==, 0.88  <<==, 0.87  <<==, 0.88  <<==  Sodium:   136  <==, 136  <==, 137  <==, 136  <==, 139  <==    ___________________________________________________________________________________  ===============>>  A S S E S S M E N T   A N D   P L A N <<===============  ------------------------------------------------------------------------------------------    · Assessment	  56 year old female with PMHx of Asthma, Anemia, Bladder cancer with 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     Problem/Plan - 1:  ·  Problem: low back / flank and chest pain.   ·  Plan: acute on chronic, involving both hips, with left worse than right. Affecting mobility. Not controlled by Tramadol and Tylenol.   CT scan of abdomen and pelvis: no evidence of osseous metastatic disease.  discussion as above       chest pain after taking morphine is not typical.. pain is not cardiac: likely from gas as pt with constipation >> bowel regimen given ... ) :  bowel regimen and simethicone as ordered : encouraged   increase ambulation  as able   palliative follow up and mgmt  appreciated   bowel regimen for constipation   anti gas meds as ordered    Problem/Plan - 2:  ·  Problem: Hemoptysis. on and off, small amounts   CT demonstrates: Numerous bilateral pulmonary metastases; mild interval increase in size of a few of the largest pulmonary metastases compared to 9/20/2022.  - Monitor for now  - pt planned for OP folowup with oncology for treatment / chemo vs immunotherapy   -  offered to have V/Q done but pt also declined stating she had it done in recent past and was negative.. ( done in May according to our records)     Problem/Plan - 3:  ·  Problem: Hypoglycemia.   ·  Plan: glucose to 59 via FS; non-diabetic, s/p D-50 in the ED  diet prescribed, along with supplements  - Monitor fingersticks.    Problem/Plan - 4:  ·  Problem: Primary cancer of bladder with metastasis to other site.   ·  Plan: with metastases and has left hydronephrosis  s/p right nephrostomy tube in place draining w/o difficulty  - Oncology consult / follow up ( pt states she will be changing oncologists from [facility], Dr Jones, to [facility])    cancer has extended to the vagina causing some bleeding as above     pt declined TVUS and GYN exam before ( probably wise as likely will cause more bleeding)        GYN appreciated , there is a mass in the vaginal canal, likely cause of bleeding           no additional benefit from a  consult at this time     Chronic anemia / anemia of chronic disease     monitor as seems to be declinig..     pt has declined blood transfusion      pt taking IV Iron       Problem/Plan - 5:  ·  Problem: Slow transit constipation.   ·  Plan: no bowel movement for the past 3 days while on opiate  - Bowel regimen as ordered   - Monitor BM.  - treat constipation : also offered Linzess  but declined; agreed to mineral oil :  sister to buy and bring in     Problem/Plan - 6:  ·  Problem: Protein calorie malnutrition.   ·  Plan: Poor PO intake, confused/frustrated as to what to eat at present. no longer taking MVI; will restart    Problem/Plan - 7:  ·  Problem: Prophylactic measure.   ·  Plan: DVT ppx: will do SCD for now, will switch to Lovenox  no PT needs.    discharge planing when pain better controlled and + regular BMs     --------------------------------------------  Case discussed with pt,   Education given on findings and plan of care  ___________________________  H. NIEVES Ochoa.  Pager: [phone]

## 2022-11-02 NOTE — PROGRESS NOTE ADULT - PROBLEM SELECTOR PROBLEM 4
Primary cancer of bladder with metastasis to other site
Chest pain
Primary cancer of bladder with metastasis to other site

## 2022-11-02 NOTE — PROGRESS NOTE ADULT - PROBLEM SELECTOR PROBLEM 3
Severe protein-calorie malnutrition
Hypoglycemia

## 2022-11-02 NOTE — PROGRESS NOTE ADULT - PROBLEM SELECTOR PLAN 4
- Patient with metastatic bladder cancer with metastases to the lungs and liver s/p nephrostomy tube   s/p right nephrostomy tube  - CT-  Large bladder mass is without significant change. Severe chronic hydronephrosis of the left kidney is unchanged. Right nephrostomy tube in place; no right hydronephrosis. 2.  Numerous bilateral pulmonary metastases; mild interval increase in size of a few of the largest pulmonary metastases compared to 9/20/2022.  3.  Progression of liver metastases. 4.  No evidence of osseous metastatic disease.  - Patient interested in further workup and treatments for her malignancy at this time . She has been in process of switching oncologic care with Dr. Alcocer/Dr. Kan  - Oncology recommendations appreciated
Metastatic bladder cancer mets to lungs and liver s/p nephrostomy tube   s/p right nephrostomy tube  CT-  Large bladder mass is without significant change. Severe chronic hydronephrosis of the left kidney is unchanged. Right nephrostomy tube in place; no right hydronephrosis. 2.  Numerous bilateral pulmonary metastases; mild interval increase in size of a few of the largest pulmonary metastases compared to 9/20/2022.  Progression of liver metastases. No evidence of osseous metastatic disease.  Having vaginal bleeding, bladder mass seems to be in pushing into vagina on exam however not reflected in CT imaging. Patient declining further imaging   Patient interested in further workup and treatments for her malignancy at this time . She has been in process of switching oncologic care with Dr. Alcocer/Dr. Kan
- Patient with metastatic bladder cancer with metastases to the lungs s/p nephrostomy tube   s/p right nephrostomy tube  - CT-  Large bladder mass is without significant change. Severe chronic hydronephrosis of the left kidney is unchanged. Right nephrostomy tube in place; no right hydronephrosis. 2.  Numerous bilateral pulmonary metastases; mild interval increase in size of a few of the largest pulmonary metastases compared to 9/20/2022.  3.  Progression of liver metastases. 4.  No evidence of osseous metastatic disease.  - Patient interested in further workup and treatments for her malignancy at this time . She has been in process of switching oncologic care with Dr. Alcocer/Dr. Kan  - Oncology recommendations appreciated
C/o chest pain earlier in the morning   ECG similar to previous and no acute ischemic changes   trop negative    - Monitor on tele   - repeat ECG if pt has chest pain
- Patient with metastatic bladder cancer with metastases to the lungs and liver s/p nephrostomy tube   s/p right nephrostomy tube  - CT-  Large bladder mass is without significant change. Severe chronic hydronephrosis of the left kidney is unchanged. Right nephrostomy tube in place; no right hydronephrosis. 2.  Numerous bilateral pulmonary metastases; mild interval increase in size of a few of the largest pulmonary metastases compared to 9/20/2022.  3.  Progression of liver metastases. 4.  No evidence of osseous metastatic disease.  - Having vaginal bleeding, bladder mass seems to be in pushing into vagina on exam however not reflected in CT imaging. Patient declining further imaging   - Patient interested in further workup and treatments for her malignancy at this time . She has been in process of switching oncologic care with Dr. Alcocer/Dr. Kan
- Patient with metastatic bladder cancer with metastases to the lungs s/p nephrostomy tube   s/p right nephrostomy tube  - CT-  Large bladder mass is without significant change. Severe chronic hydronephrosis of the left kidney is unchanged. Right nephrostomy tube in place; no right hydronephrosis. 2.  Numerous bilateral pulmonary metastases; mild interval increase in size of a few of the largest pulmonary metastases compared to 9/20/2022.  3.  Progression of liver metastases. 4.  No evidence of osseous metastatic disease.  - Patient interested in further workup and treatments for her malignancy at this time . She has been in process of switching oncologic care with Dr. Alcocer/Dr. Kan  - Oncology consulted today
Metastatic bladder cancer mets to lungs and liver s/p nephrostomy tube   s/p right nephrostomy tube  CT-  Large bladder mass is without significant change. Severe chronic hydronephrosis of the left kidney is unchanged. Right nephrostomy tube in place; no right hydronephrosis. 2.  Numerous bilateral pulmonary metastases; mild interval increase in size of a few of the largest pulmonary metastases compared to 9/20/2022.  Progression of liver metastases. No evidence of osseous metastatic disease.  Having vaginal bleeding, bladder mass seems to be in pushing into vagina on exam however not reflected in CT imaging. Patient declining further imaging   Patient interested in further workup and treatments for her malignancy at this time . She has been in process of switching oncologic care with Dr. Alcocer/Dr. Kan
- Patient with metastatic bladder cancer with metastases to the lungs and liver s/p nephrostomy tube   s/p right nephrostomy tube  - CT-  Large bladder mass is without significant change. Severe chronic hydronephrosis of the left kidney is unchanged. Right nephrostomy tube in place; no right hydronephrosis. 2.  Numerous bilateral pulmonary metastases; mild interval increase in size of a few of the largest pulmonary metastases compared to 9/20/2022.  3.  Progression of liver metastases. 4.  No evidence of osseous metastatic disease.  - Patient interested in further workup and treatments for her malignancy at this time . She has been in process of switching oncologic care with Dr. Alcocer/Dr. Kan  - Oncology recommendations appreciated

## 2022-11-02 NOTE — PROGRESS NOTE ADULT - PROBLEM SELECTOR PROBLEM 5
Encounter for palliative care
Primary cancer of bladder with metastasis to other site

## 2022-11-02 NOTE — PROGRESS NOTE ADULT - PROBLEM SELECTOR PLAN 5
Palliative following for pain management  On discharge, can prescribe morphine solution 5 mg q4h ATC and 7.5 q3h PRN for breakthrough pain, with bowel regimen   On discharge, to follow up with oncology  Page for uncontrolled symptoms

## 2022-11-02 NOTE — PROGRESS NOTE ADULT - SUBJECTIVE AND OBJECTIVE BOX
SUBJECTIVE AND OBJECTIVE:  Pt seen this AM, feeling pain is much better controlled now that she is taking morphine consistently, in better spirits. Pain has now migrated to the back.   Discussed changing to a long acting medication however she refused, but ok with taking q4h. She also did not want to increase ATC morphine to 7.5 mg ATC.   Last BM on Sunday.     INTERVAL HPI/OVERNIGHT EVENTS:  Pt does not have any PRNs ordered    Allergies    No Known Allergies    Intolerances    MEDICATIONS  (STANDING):  acetaminophen     Tablet .. 650 milliGRAM(s) Oral every 6 hours  bisacodyl 5 milliGRAM(s) Oral at bedtime  chlorhexidine 2% Cloths 1 Application(s) Topical <User Schedule>  cholecalciferol 2000 Unit(s) Oral daily  folic acid 1 milliGRAM(s) Oral daily  influenza   Vaccine 0.5 milliLiter(s) IntraMuscular once  iron sucrose IVPB 100 milliGRAM(s) IV Intermittent every 24 hours  lidocaine   4% Patch 1 Patch Transdermal every 24 hours  morphine   Solution 5 milliGRAM(s) Oral every 4 hours  polyethylene glycol 3350 17 Gram(s) Oral daily  saline laxative (FLEET) Rectal Enema 1 Enema Rectal once  senna 2 Tablet(s) Oral at bedtime    MEDICATIONS  (PRN):  aluminum hydroxide/magnesium hydroxide/simethicone Suspension 30 milliLiter(s) Oral every 4 hours PRN Dyspepsia  bisacodyl Suppository 10 milliGRAM(s) Rectal daily PRN Constipation  cyclobenzaprine 5 milliGRAM(s) Oral three times a day PRN Muscle Spasm  melatonin 3 milliGRAM(s) Oral at bedtime PRN Insomnia  morphine   Solution 7.5 milliGRAM(s) Oral every 3 hours PRN Breakthrough pain  ondansetron Injectable 4 milliGRAM(s) IV Push every 8 hours PRN Nausea and/or Vomiting  simethicone 80 milliGRAM(s) Chew four times a day PRN Gas      ITEMS UNCHECKED ARE NOT PRESENT    PRESENT SYMPTOMS: [ ]Unable to self-report due to altered mental status- see [ ] CPOT [ ] PAINADS [ ] RDOS  Source if other than patient:  [ ]Family   [ ]Team     Pain: [x ]yes [ ]no  QOL impact - moderate  Location -    left chest and now L hip             Aggravating factors  - movement   Quality - sharp  Radiation - right back and left later side   Timing- intermittent   Severity (0-10 scale): 9  Minimal acceptable level (0-10 scale): 2    Dyspnea:                           [ ]Mild [ ]Moderate [ ]Severe  Anxiety:                             [x ]Mild [ ]Moderate [ ]Severe  Agitation:                          [ ]Mild [ ]Moderate [ ]Severe  Fatigue:                             [ ]Mild [ ]Moderate [ ]Severe  Nausea:                             [ ]Mild [ ]Moderate [ ]Severe  Loss of appetite:              [ ]Mild [ ]Moderate [ ]Severe  Constipation:                   [ ]Mild [ ]Moderate [ ]Severe  Diarrhea:                          [ ]Mild [ ]Moderate [ ]Severe    CPOT:    https://www.[facility].org/getattachment/[email or web address]/Critical-Care-Pain-Observation-Tool-(CPOT)    PCSSQ[Palliative Care Spiritual Screening Question]   Severity (0-10): 5  Score of 4 or > indicate consideration of Chaplaincy referral.  Chaplaincy Referral: [x ] yes [ ] refused [ ] following [ ] deferred    Caregiver [place]? : [ ] yes [ ] no [ ] Declined [x ] Deferred              Social work referral [ ] Patient & Family Centered Care Referral [ ]     Anticipatory Grief present?:  [ ] yes [ ] no  [ x] Deferred                  Social work referral [ ] Chaplaincy Referral[ ]    Other Symptoms:  [x ]All other review of systems negative     Vital Signs Last 24 Hrs  T(C): 36.9 (02 Nov 2022 02:10), Max: 36.9 (01 Nov 2022 20:56)  T(F): 98.5 (02 Nov 2022 02:10), Max: 98.5 (02 Nov 2022 02:10)  HR: 72 (02 Nov 2022 02:10) (72 - 86)  BP: 102/50 (02 Nov 2022 02:10) (102/50 - 116/46)  BP(mean): --  RR: 16 (02 Nov 2022 02:10) (16 - 17)  SpO2: 98% (02 Nov 2022 02:10) (98% - 100%)    Parameters below as of 02 Nov 2022 02:10  Patient On (Oxygen Delivery Method): room air      GENERAL:  [ x]Alert  [x ]Oriented x 3  [ ]Lethargic  [ ]Cachexia  [ ]Unarousable  [ x]Verbal  [ ]Non-Verbal    Behavioral:   [ ] Anxiety  [ ] Delirium [ ] Agitation [ ] Calm     HEENT:  [ x]Normal  [ ] Temporal Wasting  [ ]Dry mouth   [ ]ET Tube/Trach  [ ]Oral lesions  [ ] Mucositis  PULMONARY:   [x ]Clear [ ]Tachypnea  [ ]Audible excessive secretions   [ ]Rhonchi        [ ]Right [ ]Left [ ]Bilateral  [ ]Crackles        [ ]Right [ ]Left [ ]Bilateral  [ ]Wheezing     [ ]Right [ ]Left [ ]Bilateral  [ ]Diminished breath sounds [ ]right [ ]left [ ]bilateral  CARDIOVASCULAR:    [x ]Regular [ ]Irregular [ ]Tachy  [ ]Bradley [ ]Murmur [ ]Other  GASTROINTESTINAL:  [x ]Soft  [ ]Distended   [ ]+BS  [ x]Non tender [ ]Tender  [ ]PEG [ ]OGT/ NGT  Last BM: 10/30   GENITOURINARY: Nephrostomy tube   [ ]Normal [ ] Incontinent   [ ]Oliguria/Anuria   [ ]Lloyd  MUSCULOSKELETAL:   [ x]Normal   [ ]Weakness  [ ]Bed/Wheelchair bound [ ]Edema  [  ] amputation  [  ] contraction  NEUROLOGIC:   [ x]No focal deficits  [ ]Cognitive impairment  [ ]Dysphagia [ ]Dysarthria [ ]Paresis [ ]Other   SKIN: See Nursing Skin Assessment for further details  [x ]Normal    [ ]Rash  [ ]Pressure ulcer(s)       Present on admission [ ]y [ ]n   [  ]  Wound    [  ] hyperpigmentation    CRITICAL CARE:  [ ]Shock Present  [ ]Septic [ ]Cardiogenic [ ]Neurologic [ ]Hypovolemic  [ ]Vasopressors [ ]Inotropes  [ ]Respiratory failure present [ ]Mechanical Ventilation [ ]Non-invasive ventilatory support [ ]High-Flow   [ ]Acute  [ ]Chronic [ ]Hypoxic  [ ]Hypercarbic [ ]Other  [ ]Other organ failure     LABS:                          7.3    10.75 )-----------( 366      ( 02 Nov 2022 06:45 )             25.4     11-02    138  |  101  |  19  ----------------------------<  77  4.3   |  25  |  0.94    Ca    9.8      02 Nov 2022 06:45  Phos  3.4     11-02  Mg     2.10     11-02        RADIOLOGY & ADDITIONAL STUDIES: reviewed     Protein Calorie Malnutrition Present: [ ]mild [ ]moderate [x ]severe [ ]underweight [ ]morbid obesity  https://www.andeal.org/vault/2440/web/files/ONC/Table_Clinical%20Characteristics%20to%20Document%20Malnutrition-White%20JV%20et%20al%202012.pdf    Height (cm): 162.6 (10-18-22 @ 18:03), 162.6 (09-19-22 @ 14:23), 162.6 (08-15-22 @ 15:45)  Weight (kg): 38 (10-18-22 @ 18:03), 38.3 (09-19-22 @ 14:23), 38.6 (08-15-22 @ 15:45)  BMI (kg/m2): 14.4 (10-18-22 @ 18:03), 14.5 (09-19-22 @ 14:23), 14.6 (08-15-22 @ 15:45)    [ ]PPSV2 < or = 30%  [ ]significant weight loss [ ]poor nutritional intake [ ]anasarca   [ ]Artificial Nutrition    REFERRALS:   [ ]Chaplaincy  [ ]Hospice  [ ]Child Life  [ ]Social Work  [x ]Case management [ ]Holistic Therapy      SUBJECTIVE AND OBJECTIVE:  Pt seen this AM, feeling pain is much better controlled now that she is taking morphine consistently, in better spirits. Pain has now migrated to the back.   Discussed changing to a long acting medication however she refused, but ok with taking q4h. She also did not want to increase ATC morphine to 7.5 mg ATC.   Last BM on Sunday. Hard for her to take suppositories because it causes vaginal and urinary bleeding.     INTERVAL HPI/OVERNIGHT EVENTS:  Pt does not have any PRNs ordered    Allergies    No Known Allergies    Intolerances    MEDICATIONS  (STANDING):  acetaminophen     Tablet .. 650 milliGRAM(s) Oral every 6 hours  bisacodyl 5 milliGRAM(s) Oral at bedtime  chlorhexidine 2% Cloths 1 Application(s) Topical <User Schedule>  cholecalciferol 2000 Unit(s) Oral daily  folic acid 1 milliGRAM(s) Oral daily  influenza   Vaccine 0.5 milliLiter(s) IntraMuscular once  iron sucrose IVPB 100 milliGRAM(s) IV Intermittent every 24 hours  lidocaine   4% Patch 1 Patch Transdermal every 24 hours  morphine   Solution 5 milliGRAM(s) Oral every 4 hours  polyethylene glycol 3350 17 Gram(s) Oral daily  saline laxative (FLEET) Rectal Enema 1 Enema Rectal once  senna 2 Tablet(s) Oral at bedtime    MEDICATIONS  (PRN):  aluminum hydroxide/magnesium hydroxide/simethicone Suspension 30 milliLiter(s) Oral every 4 hours PRN Dyspepsia  bisacodyl Suppository 10 milliGRAM(s) Rectal daily PRN Constipation  cyclobenzaprine 5 milliGRAM(s) Oral three times a day PRN Muscle Spasm  melatonin 3 milliGRAM(s) Oral at bedtime PRN Insomnia  morphine   Solution 7.5 milliGRAM(s) Oral every 3 hours PRN Breakthrough pain  ondansetron Injectable 4 milliGRAM(s) IV Push every 8 hours PRN Nausea and/or Vomiting  simethicone 80 milliGRAM(s) Chew four times a day PRN Gas      ITEMS UNCHECKED ARE NOT PRESENT    PRESENT SYMPTOMS: [ ]Unable to self-report due to altered mental status- see [ ] CPOT [ ] PAINADS [ ] RDOS  Source if other than patient:  [ ]Family   [ ]Team     Pain: [x ]yes [ ]no  QOL impact - moderate  Location -    left chest and now L hip             Aggravating factors  - movement   Quality - sharp  Radiation - right back and left later side   Timing- intermittent   Severity (0-10 scale): 9  Minimal acceptable level (0-10 scale): 2    Dyspnea:                           [ ]Mild [ ]Moderate [ ]Severe  Anxiety:                             [x ]Mild [ ]Moderate [ ]Severe  Agitation:                          [ ]Mild [ ]Moderate [ ]Severe  Fatigue:                             [ ]Mild [ ]Moderate [ ]Severe  Nausea:                             [ ]Mild [ ]Moderate [ ]Severe  Loss of appetite:              [ ]Mild [ ]Moderate [ ]Severe  Constipation:                   [ ]Mild [ ]Moderate [ ]Severe  Diarrhea:                          [ ]Mild [ ]Moderate [ ]Severe    CPOT:    https://www.[facility].org/getattachment/[email or web address]/Critical-Care-Pain-Observation-Tool-(CPOT)    PCSSQ[Palliative Care Spiritual Screening Question]   Severity (0-10): 5  Score of 4 or > indicate consideration of Chaplaincy referral.  Chaplaincy Referral: [x ] yes [ ] refused [ ] following [ ] deferred    Caregiver [place]? : [ ] yes [ ] no [ ] Declined [x ] Deferred              Social work referral [ ] Patient & Family Centered Care Referral [ ]     Anticipatory Grief present?:  [ ] yes [ ] no  [ x] Deferred                  Social work referral [ ] Chaplaincy Referral[ ]    Other Symptoms:  [x ]All other review of systems negative     Vital Signs Last 24 Hrs  T(C): 36.9 (02 Nov 2022 02:10), Max: 36.9 (01 Nov 2022 20:56)  T(F): 98.5 (02 Nov 2022 02:10), Max: 98.5 (02 Nov 2022 02:10)  HR: 72 (02 Nov 2022 02:10) (72 - 86)  BP: 102/50 (02 Nov 2022 02:10) (102/50 - 116/46)  BP(mean): --  RR: 16 (02 Nov 2022 02:10) (16 - 17)  SpO2: 98% (02 Nov 2022 02:10) (98% - 100%)    Parameters below as of 02 Nov 2022 02:10  Patient On (Oxygen Delivery Method): room air      GENERAL:  [ x]Alert  [x ]Oriented x 3  [ ]Lethargic  [ ]Cachexia  [ ]Unarousable  [ x]Verbal  [ ]Non-Verbal    Behavioral:   [ ] Anxiety  [ ] Delirium [ ] Agitation [ ] Calm     HEENT:  [ x]Normal  [ ] Temporal Wasting  [ ]Dry mouth   [ ]ET Tube/Trach  [ ]Oral lesions  [ ] Mucositis  PULMONARY:   [x ]Clear [ ]Tachypnea  [ ]Audible excessive secretions   [ ]Rhonchi        [ ]Right [ ]Left [ ]Bilateral  [ ]Crackles        [ ]Right [ ]Left [ ]Bilateral  [ ]Wheezing     [ ]Right [ ]Left [ ]Bilateral  [ ]Diminished breath sounds [ ]right [ ]left [ ]bilateral  CARDIOVASCULAR:    [x ]Regular [ ]Irregular [ ]Tachy  [ ]Bradley [ ]Murmur [ ]Other  GASTROINTESTINAL:  [x ]Soft  [ ]Distended   [ ]+BS  [ x]Non tender [ ]Tender  [ ]PEG [ ]OGT/ NGT  Last BM: 10/30   GENITOURINARY: Nephrostomy tube   [ ]Normal [ ] Incontinent   [ ]Oliguria/Anuria   [ ]Lloyd  MUSCULOSKELETAL:   [ x]Normal   [ ]Weakness  [ ]Bed/Wheelchair bound [ ]Edema  [  ] amputation  [  ] contraction  NEUROLOGIC:   [ x]No focal deficits  [ ]Cognitive impairment  [ ]Dysphagia [ ]Dysarthria [ ]Paresis [ ]Other   SKIN: See Nursing Skin Assessment for further details  [x ]Normal    [ ]Rash  [ ]Pressure ulcer(s)       Present on admission [ ]y [ ]n   [  ]  Wound    [  ] hyperpigmentation    CRITICAL CARE:  [ ]Shock Present  [ ]Septic [ ]Cardiogenic [ ]Neurologic [ ]Hypovolemic  [ ]Vasopressors [ ]Inotropes  [ ]Respiratory failure present [ ]Mechanical Ventilation [ ]Non-invasive ventilatory support [ ]High-Flow   [ ]Acute  [ ]Chronic [ ]Hypoxic  [ ]Hypercarbic [ ]Other  [ ]Other organ failure     LABS:                          7.3    10.75 )-----------( 366      ( 02 Nov 2022 06:45 )             25.4     11-02    138  |  101  |  19  ----------------------------<  77  4.3   |  25  |  0.94    Ca    9.8      02 Nov 2022 06:45  Phos  3.4     11-02  Mg     2.10     11-02        RADIOLOGY & ADDITIONAL STUDIES: reviewed     Protein Calorie Malnutrition Present: [ ]mild [ ]moderate [x ]severe [ ]underweight [ ]morbid obesity  https://www.andeal.org/vault/7790/web/files/ONC/Table_Clinical%20Characteristics%20to%20Document%20Malnutrition-White%20JV%20et%20al%202012.pdf    Height (cm): 162.6 (10-18-22 @ 18:03), 162.6 (09-19-22 @ 14:23), 162.6 (08-15-22 @ 15:45)  Weight (kg): 38 (10-18-22 @ 18:03), 38.3 (09-19-22 @ 14:23), 38.6 (08-15-22 @ 15:45)  BMI (kg/m2): 14.4 (10-18-22 @ 18:03), 14.5 (09-19-22 @ 14:23), 14.6 (08-15-22 @ 15:45)    [ ]PPSV2 < or = 30%  [ ]significant weight loss [ ]poor nutritional intake [ ]anasarca   [ ]Artificial Nutrition    REFERRALS:   [ ]Chaplaincy  [ ]Hospice  [ ]Child Life  [ ]Social Work  [x ]Case management [ ]Holistic Therapy

## 2022-11-02 NOTE — PROGRESS NOTE ADULT - ASSESSMENT
( Note written / Date of service 11-02-22 )    ==================>> MEDICATIONS <<====================    acetaminophen     Tablet .. 650 milliGRAM(s) Oral every 6 hours  bisacodyl 5 milliGRAM(s) Oral at bedtime  chlorhexidine 2% Cloths 1 Application(s) Topical <User Schedule>  cholecalciferol 2000 Unit(s) Oral daily  folic acid 1 milliGRAM(s) Oral daily  influenza   Vaccine 0.5 milliLiter(s) IntraMuscular once  iron sucrose IVPB 100 milliGRAM(s) IV Intermittent every 24 hours  lidocaine   4% Patch 1 Patch Transdermal every 24 hours  morphine   Solution 5 milliGRAM(s) Oral every 4 hours  polyethylene glycol 3350 17 Gram(s) Oral daily  saline laxative (FLEET) Rectal Enema 1 Enema Rectal once  senna 2 Tablet(s) Oral at bedtime    MEDICATIONS  (PRN):  aluminum hydroxide/magnesium hydroxide/simethicone Suspension 30 milliLiter(s) Oral every 4 hours PRN Dyspepsia  bisacodyl Suppository 10 milliGRAM(s) Rectal daily PRN Constipation  cyclobenzaprine 5 milliGRAM(s) Oral three times a day PRN Muscle Spasm  melatonin 3 milliGRAM(s) Oral at bedtime PRN Insomnia  morphine   Solution 7.5 milliGRAM(s) Oral every 3 hours PRN Breakthrough pain  ondansetron Injectable 4 milliGRAM(s) IV Push every 8 hours PRN Nausea and/or Vomiting  simethicone 80 milliGRAM(s) Chew four times a day PRN Gas    ___________  Active diet:  Diet, Regular:   High Fiber (HIFIBER)  Supplement Feeding Modality:  Oral  Ensure Plus High Protein Cans or Servings Per Day:  2       Frequency:  Daily  ___________________    ==================>> VITAL SIGNS <<==================    Vital Signs Last 24 HrsT(C): 36.6 (11-02-22 @ 14:14)  T(F): 97.8 (11-02-22 @ 14:14), Max: 98.5 (11-02-22 @ 02:10)  HR: 69 (11-02-22 @ 14:14) (69 - 86)  BP: 97/52 (11-02-22 @ 14:14)  RR: 16 (11-02-22 @ 02:10) (16 - 17)  SpO2: 100% (11-02-22 @ 14:14) (98% - 100%)      CAPILLARY BLOOD GLUCOSE         ==================>> LAB AND IMAGING <<==================                        7.5    9.79  )-----------( 354      ( 02 Nov 2022 16:45 )             26.5        11-02    138  |  101  |  19  ----------------------------<  77  4.3   |  25  |  0.94    Ca    9.8      02 Nov 2022 06:45  Phos  3.4     11-02  Mg     2.10     11-02      WBC count:   9.79 <<== ,  10.75 <<== ,  9.59 <<== ,  10.27 <<== ,  10.04 <<==   Hemoglobin:   7.5 <<==,  7.3 <<==,  7.7 <<==,  8.1 <<==,  8.0 <<==  platelets:  354 <==, 366 <==, 382 <==, 399 <==, 412 <==, 413 <==    Creatinine:  0.94  <<==, 0.86  <<==, 0.83  <<==, 0.88  <<==, 0.87  <<==  Sodium:   138  <==, 136  <==, 136  <==, 137  <==, 136  <==       AST:               ALT:             AP:             Bili:            ____________________________    M I C R O B I O L O G Y :      COVID-19 PCR: NotDetec (10-30-22 @ 09:38)  SARS-CoV-2: NotDetec (10-17-22 @ 21:31)  COVID-19 PCR: NotDetec (10-14-22 @ 06:00)     _________________________________________________________________________________________  ========>>  M E D I C A L   A T T E N D I N G    F O L L O W  U P  N O T E  <<=========  -----------------------------------------------------------------------------------------------------    - Patient seen and examined by me earlier today.   - In summary,  JEF HOUSE is a 56y year old woman admitted with back / flank pain  - Patient today overall doing the same, no BM, intermittent pain i left flank area..     ==================>> REVIEW OF SYSTEM <<=================    GEN: no fever, no chills, on and off pain as above   RESP: no SOB, no cough, no sputum  CVS: ++ chest / upper abd pain, no palpitations, no edema  GI: abdominal pain, no nausea, constipation as above   : no dysuria, no frequency, no hematuria in tube or voiding      + occasional chronic vaginal bleeding   Neuro: no headache, no dizziness  Derm : no itching, no rash    ==================>> PHYSICAL EXAM <<=================    GEN: A&O X 3 , NAD , uncomfortable, pleasant, calm , cachectic , pt encouraged to take meds as needed for comfort and bowel regimen   HEENT: NCAT, PERRL, MMM, hearing intact  Neck: supple , no JVD appreciated  CVS: S1S2 , regular , No M/R/G appreciated  PULM: CTA B/L,  no W/R/R appreciated  ABD.: soft. non tender, non distended... encouraged pt to take bowel regimen   Extrem: intact pulses , no edema     nephrostomy in place and draining to bag   PSYCH : normal mood,  not anxious                             ( Note written / Date of service 11-02-22 )    ==================>> MEDICATIONS <<====================    acetaminophen     Tablet .. 650 milliGRAM(s) Oral every 6 hours  bisacodyl 5 milliGRAM(s) Oral at bedtime  chlorhexidine 2% Cloths 1 Application(s) Topical <User Schedule>  cholecalciferol 2000 Unit(s) Oral daily  folic acid 1 milliGRAM(s) Oral daily  influenza   Vaccine 0.5 milliLiter(s) IntraMuscular once  iron sucrose IVPB 100 milliGRAM(s) IV Intermittent every 24 hours  lidocaine   4% Patch 1 Patch Transdermal every 24 hours  morphine   Solution 5 milliGRAM(s) Oral every 4 hours  polyethylene glycol 3350 17 Gram(s) Oral daily  saline laxative (FLEET) Rectal Enema 1 Enema Rectal once  senna 2 Tablet(s) Oral at bedtime    MEDICATIONS  (PRN):  aluminum hydroxide/magnesium hydroxide/simethicone Suspension 30 milliLiter(s) Oral every 4 hours PRN Dyspepsia  bisacodyl Suppository 10 milliGRAM(s) Rectal daily PRN Constipation  cyclobenzaprine 5 milliGRAM(s) Oral three times a day PRN Muscle Spasm  melatonin 3 milliGRAM(s) Oral at bedtime PRN Insomnia  morphine   Solution 7.5 milliGRAM(s) Oral every 3 hours PRN Breakthrough pain  ondansetron Injectable 4 milliGRAM(s) IV Push every 8 hours PRN Nausea and/or Vomiting  simethicone 80 milliGRAM(s) Chew four times a day PRN Gas    ___________  Active diet:  Diet, Regular:   High Fiber (HIFIBER)  Supplement Feeding Modality:  Oral  Ensure Plus High Protein Cans or Servings Per Day:  2       Frequency:  Daily  ___________________    ==================>> VITAL SIGNS <<==================    Vital Signs Last 24 HrsT(C): 36.6 (11-02-22 @ 14:14)  T(F): 97.8 (11-02-22 @ 14:14), Max: 98.5 (11-02-22 @ 02:10)  HR: 69 (11-02-22 @ 14:14) (69 - 86)  BP: 97/52 (11-02-22 @ 14:14)  RR: 16 (11-02-22 @ 02:10) (16 - 17)  SpO2: 100% (11-02-22 @ 14:14) (98% - 100%)       ==================>> LAB AND IMAGING <<==================                        7.5    9.79  )-----------( 354      ( 02 Nov 2022 16:45 )             26.5        11-02    138  |  101  |  19  ----------------------------<  77  4.3   |  25  |  0.94    Ca    9.8      02 Nov 2022 06:45  Phos  3.4     11-02  Mg     2.10     11-02      WBC count:   9.79 <<== ,  10.75 <<== ,  9.59 <<== ,  10.27 <<== ,  10.04 <<==   Hemoglobin:   7.5 <<==,  7.3 <<==,  7.7 <<==,  8.1 <<==,  8.0 <<==  platelets:  354 <==, 366 <==, 382 <==, 399 <==, 412 <==, 413 <==    Creatinine:  0.94  <<==, 0.86  <<==, 0.83  <<==, 0.88  <<==, 0.87  <<==  Sodium:   138  <==, 136  <==, 136  <==, 137  <==, 136  <==    ___________________________________________________________________________________  ===============>>  A S S E S S M E N T   A N D   P L A N <<===============  ------------------------------------------------------------------------------------------    · Assessment	  56 year old female with PMHx of Asthma, Anemia, Bladder cancer with 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     Problem/Plan - 1:  ·  Problem: low back / flank and chest pain.   ·  Plan: acute on chronic, involving both hips, with left worse than right. Affecting mobility. Not controlled by Tramadol and Tylenol.   CT scan of abdomen and pelvis: no evidence of osseous metastatic disease.  discussion as above       chest pain after taking morphine is not typical.. pain is not cardiac: likely from gas as pt with constipation >> bowel regimen given ... ) :  bowel regimen and simethicone as ordered : encouraged   increase ambulation  as able   palliative follow up and mgmt  appreciated   bowel regimen for constipation   anti gas meds as ordered    Problem/Plan - 2:  ·  Problem: Hemoptysis. on and off, small amounts and appears to be less..   CT demonstrates: Numerous bilateral pulmonary metastases; mild interval increase in size of a few of the largest pulmonary metastases compared to 9/20/2022.  - Monitor for now  - pt planned for OP folowup with oncology for treatment / chemo vs immunotherapy     Problem/Plan - 3:  ·  Problem: h/o Hypoglycemia.   ·  Plan: glucose to 59 via FS; non-diabetic, s/p D-50 in the ED  diet encouraged along with supplements  - Monitor fingersticks.    Problem/Plan - 4:  ·  Problem: Primary cancer of bladder with metastasis to other site.   ·  Plan: with metastases and has left hydronephrosis and vaginal bleeding due to mass invasion to cavity   s/p right nephrostomy tube in place draining w/o difficulty  - Oncology consult / follow up ( pt states she will be changing oncologists from [facility], Dr Jones, to [facility])    cancer has extended to the vagina causing some bleeding as above     pt declined TVUS and GYN exam before ( probably wise as likely will cause more bleeding)        GYN appreciated , there is a mass in the vaginal canal, likely cause of bleeding           no additional benefit from a  consult at this time     Chronic anemia / anemia of chronic disease     monitor as seems to be declinig..     pt has declined blood transfusion      pt taking IV Iron       Problem/Plan - 5:  ·  Problem: Slow transit constipation.   ·  Plan: no bowel movement for the past 3 days while on opiate  - Bowel regimen as ordered   - Monitor BM.  - treat constipation : also offered Linzess  but declined; agreed to mineral oil :  sister to buy and bring in     Problem/Plan - 6:  ·  Problem: Protein calorie malnutrition.   ·  Plan: Poor PO intake, confused/frustrated as to what to eat at present. no longer taking MVI; will restart    Problem/Plan - 7:  ·  Problem: Prophylactic measure.   ·  Plan: DVT ppx: will do SCD for now, will switch to Lovenox  no PT needs.    discharge planing when pain better controlled and + regular BMs     --------------------------------------------  Case discussed with pt,   Education given on findings and plan of care  ___________________________  H. NIEVES Ochoa.  Pager: [phone]

## 2022-11-02 NOTE — PROGRESS NOTE ADULT - PROBLEM SELECTOR PLAN 1
- Patient complaining of severe left sided chest pain, found crying and anxious in pain at times   - Source of pain unclear however, no bone mets or lesions in area. Possibly referred pain from abdominal source?   - Patient very hesitant about taking opioids, worried about side effects, making associations with medical events and opioids but in reality are not related   - c/w morphine solution 5 mg q4h ATC - starting to become compliant   - c/w tylenol 650 mg ATC  - bowel regimen
- Per discussion, has tried Percocet and states she did not tolerate it well, and does not wish to try it again despite discussing can consider trying Oxycodone IR 2.5mg PRN.   - No unexpected adverse effects of opiates noted: no sedation/dizziness/nausea.  - PO Tylenol 650mg q6-8 PRN mild-moderate pain   - PO Morphine Solution 5mg q4 PRN moderate-severe pain (hold for hypotension, oversedation, respiratory depression)  Discussed can increase prn dose if inadequate relief; but patient prefers to stay on current dose while also alternating with tylenol as she is hesitant about taking "too much" opioids   Continue to monitor GFR/ Creatinine while on morphine ; if Creatinine worsens may need to switch to alternate opioid   - Bowel regimen while on opiates  - Recommend ongoing further workup of etiology of back pain, including further imaging if not already completed recently inpatient/outpatient.
Source of pain unclear however, no bone mets or lesions in area. Possibly referred pain from abdominal source?   Pain migrates to different locations, also possible musculoskeletal component   Patient very hesitant about taking opioids however has become more willing to take, pain now better controlled on this regimen, feeling improved   - c/w morphine solution 5 mg q4h ATC   - c/w morphine solution 7.5mg Po q3hrs PRN for breakthrough pain  - c/w flexeril 5mg TID PRN for muscle spasm   - bowel regimen
Source of pain unclear however, no bone mets or lesions in area. Possibly referred pain from abdominal source?   Patient very hesitant about taking opioids  Indicates that the pain in her L hip is new  Would recommend:  - c/w morphine solution 5 mg q4h ATC   - Added Flexeril 5mg TID PRN for muscle spasm which pt was in agreement   - Add Morphine solution 7.5mg Po q3hrs PRN for breakthrough pain  - bowel regimen
- Patient complaining of severe left sided chest pain, found crying in pain   - Source of pain unclear however, no bone mets or lesions in area   - Was taking morphine 5 mg q4h PRN for pain, however not working but patient also thinking morphine causing pain   - Started tylenol 650 mg ATC   - Started on IV morphine 2 mg q4h ATC and 1 mg q2h PRN for breakthrough pain
- Per discussion yesterday, has tried Percocet and states she did not tolerate it well, and does not wish to try it again despite discussing can consider trying Oxycodone IR 2.5mg PRN.   - No unexpected adverse effects of opiates noted: no sedation/dizziness/nausea.  - PO Tylenol 650mg q6-8 PRN mild-moderate pain   - PO Morphine Solution 5mg q4 PRN moderate-severe pain (hold for hypotension, oversedation, respiratory depression)  Discussed can increase prn dose if inadequate relief; but patient prefers to stay on current dose  - Bowel regimen while on opiates  - Recommend ongoing further workup of etiology of back pain, including further imaging if not already completed recently inpatient/outpatient.
- Patient complaining of severe left sided chest pain, found crying in pain at times   - Source of pain unclear however, no bone mets or lesions in area. Possibly referred pain from abdominal source?   - Patient very hesitant about taking opioids, worried about side effects, making associations with medical events and opioids but in reality are not related   - Agreeable to morphine solution 5 mg q4h ATC   - c/w tylenol 650 mg ATC
acute on chronic, involving both hips, with left worse than right. Affecting mobility. Not controlled by Tramadol and Tylenol.   CT scan of abdomen and pelvis: no evidence of osseous metastatic disease.  s/p morphine 2 mg IV in ED.     - Started on Morphine but she is reluctant to take narcotics. Visibly upset about the pain. Will avoid Tramadol and NSAIDs for now in setting of hemoptysis   - Consult Palliative care in the morning   - will start on Lidocaine patch as well

## 2022-11-02 NOTE — PROGRESS NOTE ADULT - PROBLEM SELECTOR PLAN 3
Nutrition recommendations appreciated.
glucose to 59 via FS; non-diabetic, s/p D-50 in the ED  diet prescribed, along with supplements    - Monitor fingersticks

## 2022-11-02 NOTE — PROGRESS NOTE ADULT - PROBLEM SELECTOR PLAN 2
- Patient amenable to Holistic RN referral  - Discussed with patient about outpatient psychologist support at [facility] which patient is amenable to considering once discharged.
patient endorsed same earlier, in the setting of cancer metastatic to the lungs  no episodes since admission   CT demonstrates: Numerous bilateral pulmonary metastases; mild interval increase in size of a few of the largest pulmonary metastases compared to 9/20/2022.    - Consulted Pulmonology   - Monitor H/H
- Patient amenable to Holistic RN referral  - Discussed with patient about outpatient psychologist support at [facility] which patient is amenable to considering once discharged.
- Patient amenable to Holistic RN referral  - Discussed with patient about outpatient psychologist support at [facility] which patient is amenable to considering once discharged.
- Patient amenable to Holistic RN referral  - Discussed with patient about outpatient psychologist support at [facility] which patient is amenable to considering once discharged.
- Patient amenable to Holistic RN referral  - Discussed with patient about outpatient psychologist support at [facility] which patient is amenable to considering once discharged.
- Patient amenable to Holistic RN referral  - Discussed with patient about outpatient psychologist support at [facility] which patient is amenable to considering once discharged.
- Patient amenable to Holistic RN referral  - Discussed with patient about outpatient psychologist support at [facility] which patient is amenable to considering once discharged.

## 2022-11-03 NOTE — CHART NOTE - NSCHARTNOTEFT_GEN_A_CORE
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  Patient reports fair appetite, reports tolerating Ensure supplement. Denies nausea, vomiting, diarrhea during visit. Patient reports experiencing constipation. Reports last bowel movement 10/30/2022. As per chart review, patient refused suppository on 10/30/2022. Patient reports drinking prune juice everyday. Adequate fiber and po fluids encouraged. Recommend to consider probiotics, per MD discretion. No reports of difficulty chewing or swallowing at this time. As per flow sheet, patient is consuming 0-75% of meals. Adequate po intake encouraged. As per flow sheet, no edema, no pressure injury noted at this time. Labs reviewed: Hemoglobin/ Hematocrit- 7.5/26.5(11/2/2022), was on iron sucrose IV for repletion. Patient is on folic acid, cholecalciferol for micronutrient support.     __________________ Pertinent Medications__________________   MEDICATIONS  (STANDING):  acetaminophen     Tablet .. 650 milliGRAM(s) Oral every 6 hours  bisacodyl 5 milliGRAM(s) Oral at bedtime  chlorhexidine 2% Cloths 1 Application(s) Topical <User Schedule>  cholecalciferol 2000 Unit(s) Oral daily  folic acid 1 milliGRAM(s) Oral daily  influenza   Vaccine 0.5 milliLiter(s) IntraMuscular once  lidocaine   4% Patch 1 Patch Transdermal every 24 hours  morphine   Solution 5 milliGRAM(s) Oral every 4 hours  polyethylene glycol 3350 17 Gram(s) Oral daily  saline laxative (FLEET) Rectal Enema 1 Enema Rectal once  senna 2 Tablet(s) Oral at bedtime    MEDICATIONS  (PRN):  aluminum hydroxide/magnesium hydroxide/simethicone Suspension 30 milliLiter(s) Oral every 4 hours PRN Dyspepsia  bisacodyl Suppository 10 milliGRAM(s) Rectal daily PRN Constipation  cyclobenzaprine 5 milliGRAM(s) Oral three times a day PRN Muscle Spasm  melatonin 3 milliGRAM(s) Oral at bedtime PRN Insomnia  morphine   Solution 7.5 milliGRAM(s) Oral every 3 hours PRN Breakthrough pain  ondansetron Injectable 4 milliGRAM(s) IV Push every 8 hours PRN Nausea and/or Vomiting  simethicone 80 milliGRAM(s) Chew four times a day PRN Gas      __________________ Pertinent Labs__________________   11-02 Na138 mmol/L Glu 77 mg/dL K+ 4.3 mmol/L Cr  0.94 mg/dL BUN 19 mg/dL 11-02 Phos 3.4 mg/dL 10-18 PAB 19 mg/dL<L>    Estimated Needs:   [x ] no change since previous assessment    Previous Nutrition Diagnosis:     [x ] Malnutrition     Nutrition Diagnosis is [x ] ongoing      New Nutrition Diagnosis: [x ] not applicable    Interventions:     Recommend    [x] Continue with current diet and supplement.  [x] Replete H/H, per MD discretion.  [x] Consider probiotics, to promote gut health.   [x ] Encourage PO intake and honor food preferences as able.      Monitoring and Evaluation:     [x ] PO intake [x ] Tolerance to diet prescription [x ] weights [x ] follow up per protocol  [x ] other: Bowel movement, labs

## 2022-11-03 NOTE — PROGRESS NOTE ADULT - ASSESSMENT
( Note written / Date of service 11-03-22 )    ==================>> MEDICATIONS <<====================    acetaminophen     Tablet .. 650 milliGRAM(s) Oral every 6 hours  bisacodyl 5 milliGRAM(s) Oral at bedtime  chlorhexidine 2% Cloths 1 Application(s) Topical <User Schedule>  cholecalciferol 2000 Unit(s) Oral daily  folic acid 1 milliGRAM(s) Oral daily  influenza   Vaccine 0.5 milliLiter(s) IntraMuscular once  lidocaine   4% Patch 1 Patch Transdermal every 24 hours  morphine   Solution 5 milliGRAM(s) Oral every 4 hours  polyethylene glycol 3350 17 Gram(s) Oral daily  saline laxative (FLEET) Rectal Enema 1 Enema Rectal once  senna 2 Tablet(s) Oral at bedtime    MEDICATIONS  (PRN):  aluminum hydroxide/magnesium hydroxide/simethicone Suspension 30 milliLiter(s) Oral every 4 hours PRN Dyspepsia  bisacodyl Suppository 10 milliGRAM(s) Rectal daily PRN Constipation  cyclobenzaprine 5 milliGRAM(s) Oral three times a day PRN Muscle Spasm  melatonin 3 milliGRAM(s) Oral at bedtime PRN Insomnia  morphine   Solution 7.5 milliGRAM(s) Oral every 3 hours PRN Breakthrough pain  ondansetron Injectable 4 milliGRAM(s) IV Push every 8 hours PRN Nausea and/or Vomiting  simethicone 80 milliGRAM(s) Chew four times a day PRN Gas    ___________  Active diet:  Diet, Regular:   High Fiber (HIFIBER)  Supplement Feeding Modality:  Oral  Ensure Plus High Protein Cans or Servings Per Day:  2       Frequency:  Daily  ___________________    ==================>> VITAL SIGNS <<==================    Vital Signs Last 24 HrsT(C): 37.2 (11-03-22 @ 13:49)  T(F): 98.9 (11-03-22 @ 13:49), Max: 98.9 (11-03-22 @ 13:49)  HR: 75 (11-03-22 @ 13:49) (67 - 75)  BP: 101/54 (11-03-22 @ 13:49)  RR: 17 (11-03-22 @ 13:49) (16 - 17)  SpO2: 100% (11-03-22 @ 13:49) (100% - 100%)      CAPILLARY BLOOD GLUCOSE         ==================>> LAB AND IMAGING <<==================                        7.5    9.79  )-----------( 354      ( 02 Nov 2022 16:45 )             26.5        11-02    138  |  101  |  19  ----------------------------<  77  4.3   |  25  |  0.94    Ca    9.8      02 Nov 2022 06:45  Phos  3.4     11-02  Mg     2.10     11-02      WBC count:   9.79 <<== ,  10.75 <<== ,  9.59 <<== ,  10.27 <<==   Hemoglobin:   7.5 <<==,  7.3 <<==,  7.7 <<==,  8.1 <<==  platelets:  354 <==, 366 <==, 382 <==, 399 <==, 412 <==    Creatinine:  0.94  <<==, 0.86  <<==, 0.83  <<==, 0.88  <<==  Sodium:   138  <==, 136  <==, 136  <==, 137  <==       AST:               ALT:             AP:             Bili:            ____________________________    M I C R O B I O L O G Y :      COVID-19 PCR: NotDetec (10-30-22 @ 09:38)  SARS-CoV-2: NotDetec (10-17-22 @ 21:31)     _________________________________________________________________________________________  ========>>  M E D I C A L   A T T E N D I N G    F O L L O W  U P  N O T E  <<=========  -----------------------------------------------------------------------------------------------------    - Patient seen and examined by me earlier today.   - In summary,  JEF HOUSE is a 56y year old woman admitted with back / flank pain  - Patient today overall doing the same, no BM, intermittent pain i left flank area..     ==================>> REVIEW OF SYSTEM <<=================    GEN: no fever, no chills, on and off pain as above   RESP: no SOB, no cough, no sputum  CVS: ++ chest / upper abd pain, no palpitations, no edema  GI: abdominal pain, no nausea, constipation as above   : no dysuria, no frequency, no hematuria in tube or voiding      + occasional chronic vaginal bleeding   Neuro: no headache, no dizziness  Derm : no itching, no rash    ==================>> PHYSICAL EXAM <<=================    GEN: A&O X 3 , NAD , uncomfortable, pleasant, calm , cachectic , pt encouraged to take meds as needed for comfort and bowel regimen   HEENT: NCAT, PERRL, MMM, hearing intact  Neck: supple , no JVD appreciated  CVS: S1S2 , regular , No M/R/G appreciated  PULM: CTA B/L,  no W/R/R appreciated  ABD.: soft. non tender, non distended... encouraged pt to take bowel regimen   Extrem: intact pulses , no edema     nephrostomy in place and draining to bag   PSYCH : normal mood,  not anxious                                ( Note written / Date of service 11-03-22 )    ==================>> MEDICATIONS <<====================    acetaminophen     Tablet .. 650 milliGRAM(s) Oral every 6 hours  bisacodyl 5 milliGRAM(s) Oral at bedtime  chlorhexidine 2% Cloths 1 Application(s) Topical <User Schedule>  cholecalciferol 2000 Unit(s) Oral daily  folic acid 1 milliGRAM(s) Oral daily  influenza   Vaccine 0.5 milliLiter(s) IntraMuscular once  lidocaine   4% Patch 1 Patch Transdermal every 24 hours  morphine   Solution 5 milliGRAM(s) Oral every 4 hours  polyethylene glycol 3350 17 Gram(s) Oral daily  saline laxative (FLEET) Rectal Enema 1 Enema Rectal once  senna 2 Tablet(s) Oral at bedtime    MEDICATIONS  (PRN):  aluminum hydroxide/magnesium hydroxide/simethicone Suspension 30 milliLiter(s) Oral every 4 hours PRN Dyspepsia  bisacodyl Suppository 10 milliGRAM(s) Rectal daily PRN Constipation  cyclobenzaprine 5 milliGRAM(s) Oral three times a day PRN Muscle Spasm  melatonin 3 milliGRAM(s) Oral at bedtime PRN Insomnia  morphine   Solution 7.5 milliGRAM(s) Oral every 3 hours PRN Breakthrough pain  ondansetron Injectable 4 milliGRAM(s) IV Push every 8 hours PRN Nausea and/or Vomiting  simethicone 80 milliGRAM(s) Chew four times a day PRN Gas    ___________  Active diet:  Diet, Regular:   High Fiber (HIFIBER)  Supplement Feeding Modality:  Oral  Ensure Plus High Protein Cans or Servings Per Day:  2       Frequency:  Daily  ___________________    ==================>> VITAL SIGNS <<==================    Vital Signs Last 24 HrsT(C): 36.6 (11-03-22 @ 22:14)  T(F): 97.8 (11-03-22 @ 22:14), Max: 98.9 (11-03-22 @ 13:49)  HR: 72 (11-03-22 @ 22:14) (68 - 75)  BP: 118/60 (11-03-22 @ 22:14)  RR: 16 (11-03-22 @ 22:14) (16 - 17)  SpO2: 100% (11-03-22 @ 22:14) (100% - 100%)       ==================>> LAB AND IMAGING <<==================                        7.5    9.79  )-----------( 354      ( 02 Nov 2022 16:45 )             26.5        11-02    138  |  101  |  19  ----------------------------<  77  4.3   |  25  |  0.94    Ca    9.8      02 Nov 2022 06:45  Phos  3.4     11-02  Mg     2.10     11-02      WBC count:   9.79 <<== ,  10.75 <<== ,  9.59 <<== ,  10.27 <<==   Hemoglobin:   7.5 <<==,  7.3 <<==,  7.7 <<==,  8.1 <<==  platelets:  354 <==, 366 <==, 382 <==, 399 <==, 412 <==    Creatinine:  0.94  <<==, 0.86  <<==, 0.83  <<==, 0.88  <<==  Sodium:   138  <==, 136  <==, 136  <==, 137  <==      ___________________________________________________________________________________  ===============>>  A S S E S S M E N T   A N D   P L A N <<===============  ------------------------------------------------------------------------------------------    · Assessment	  56 year old female with PMHx of Asthma, Anemia, Bladder cancer with 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     Problem/Plan - 1:  ·  Problem: low back / flank and chest pain.   ·  Plan: acute on chronic, involving both hips, with left worse than right. Affecting mobility. Not controlled by Tramadol and Tylenol.   CT scan of abdomen and pelvis: no evidence of osseous metastatic disease.  discussion as above       chest pain after taking morphine is not typical.. pain is not cardiac: likely from gas as pt with constipation >> bowel regimen given ... ) :  bowel regimen and simethicone as ordered : encouraged   increase ambulation  as able   palliative follow up and mgmt  appreciated   bowel regimen for constipation   anti gas meds as ordered    Problem/Plan - 2:  ·  Problem: Hemoptysis. on and off, small amounts and appears to be less..   CT demonstrates: Numerous bilateral pulmonary metastases; mild interval increase in size of a few of the largest pulmonary metastases compared to 9/20/2022.  - Monitor for now  - pt planned for OP folowup with oncology for treatment / chemo vs immunotherapy     Problem/Plan - 3:  ·  Problem: h/o Hypoglycemia.   ·  Plan: glucose to 59 via FS; non-diabetic, s/p D-50 in the ED  diet encouraged along with supplements  - Monitor fingersticks.    Problem/Plan - 4:  ·  Problem: Primary cancer of bladder with metastasis to other site.   ·  Plan: with metastases and has left hydronephrosis and vaginal bleeding due to mass invasion to cavity   s/p right nephrostomy tube in place draining w/o difficulty  - Oncology consult / follow up ( pt states she will be changing oncologists from [facility], Dr Jones, to [facility])    cancer has extended to the vagina causing some bleeding as above     pt declined TVUS and GYN exam before ( probably wise as likely will cause more bleeding)        GYN appreciated , there is a mass in the vaginal canal, likely cause of bleeding           no additional benefit from a  consult at this time     Chronic anemia / anemia of chronic disease     monitor as seems to be declinig..     pt has declined blood transfusion >> transfuse one unite PRBC IF pt agreeable      pt post IV Iron       Problem/Plan - 5:  ·  Problem: Slow transit constipation.   ·  Plan: no bowel movement for the past 3 days while on opiate  - Bowel regimen as ordered   - Monitor BM.  - treat constipation : also offered Linzess  but declined; agreed to mineral oil :  sister to buy and bring in     Problem/Plan - 6:  ·  Problem: Protein calorie malnutrition.   ·  Plan: Poor PO intake, confused/frustrated as to what to eat at present. no longer taking MVI; will restart    Problem/Plan - 7:  ·  Problem: Prophylactic measure.   ·  Plan: DVT ppx: will do SCD for now, will switch to Lovenox  no PT needs.    discharge planing when pain better controlled and + regular BMs     --------------------------------------------  Case discussed with pt,   Education given on findings and plan of care  ___________________________  H. NIEVES Ochao.  Pager: [phone]

## 2022-11-04 NOTE — PROGRESS NOTE ADULT - ASSESSMENT
_________________________________________________________________________________________  ========>>  M E D I C A L   A T T E N D I N G    F O L L O W  U P  N O T E  <<=========  -----------------------------------------------------------------------------------------------------    - Patient seen and examined by me earlier today.   - In summary,  JEF HOUSE is a 56y year old woman admitted with back / flank pain  - Patient today overall doing the same, no BM, intermittent pain i left flank area and mid chest pain ..      pt declining antiinflammatory meds, Dilaudid asking only for tylenol, while hysterically crying with pain in chest ( reproducible rib / costochondral pain) !    ==================>> REVIEW OF SYSTEM <<=================    GEN: no fever, no chills, on and off pain as above   RESP: no SOB, no cough, no sputum  CVS: ++ chest / upper abd pain, no palpitations, no edema  GI: abdominal pain, no nausea, constipation as above   : no dysuria, no frequency, no hematuria in tube or voiding      + occasional chronic vaginal bleeding   Neuro: no headache, no dizziness  Derm : no itching, no rash    ==================>> PHYSICAL EXAM <<=================    GEN: A&O X 3 , NAD , uncomfortable, pleasant, calm , cachectic , pt encouraged to take meds as needed for comfort and bowel regimen   HEENT: NCAT, PERRL, MMM, hearing intact  Neck: supple , no JVD appreciated  CVS: S1S2 , regular , No M/R/G appreciated  PULM: CTA B/L,  no W/R/R appreciated  ABD.: soft. non tender, non distended... encouraged pt to take bowel regimen   Extrem: intact pulses , no edema     nephrostomy in place and draining to bag   PSYCH : normal mood,  not anxious                             ( Note written / Date of service 11-04-22 )    ==================>> MEDICATIONS <<====================    acetaminophen     Tablet .. 650 milliGRAM(s) Oral every 6 hours  bisacodyl 5 milliGRAM(s) Oral at bedtime  chlorhexidine 2% Cloths 1 Application(s) Topical <User Schedule>  cholecalciferol 2000 Unit(s) Oral daily  folic acid 1 milliGRAM(s) Oral daily  influenza   Vaccine 0.5 milliLiter(s) IntraMuscular once  lidocaine   4% Patch 1 Patch Transdermal every 24 hours  polyethylene glycol 3350 17 Gram(s) Oral daily  saline laxative (FLEET) Rectal Enema 1 Enema Rectal once  senna 2 Tablet(s) Oral at bedtime    MEDICATIONS  (PRN):  aluminum hydroxide/magnesium hydroxide/simethicone Suspension 30 milliLiter(s) Oral every 4 hours PRN Dyspepsia  bisacodyl Suppository 10 milliGRAM(s) Rectal daily PRN Constipation  melatonin 3 milliGRAM(s) Oral at bedtime PRN Insomnia  morphine   Solution 7.5 milliGRAM(s) Oral every 3 hours PRN Breakthrough pain  ondansetron Injectable 4 milliGRAM(s) IV Push every 8 hours PRN Nausea and/or Vomiting  simethicone 80 milliGRAM(s) Chew four times a day PRN Gas    ___________  Active diet:  Diet, Regular:   High Fiber (HIFIBER)  Supplement Feeding Modality:  Oral  Ensure Plus High Protein Cans or Servings Per Day:  2       Frequency:  Daily  ___________________    ==================>> VITAL SIGNS <<==================    Vital Signs Last 24 HrsT(C): 36.8 (11-04-22 @ 13:22)  T(F): 98.3 (11-04-22 @ 13:22), Max: 98.4 (11-04-22 @ 10:53)  HR: 68 (11-04-22 @ 13:22) (66 - 78)  BP: 102/48 (11-04-22 @ 13:22)  RR: 18 (11-04-22 @ 13:22) (16 - 18)  SpO2: 100% (11-04-22 @ 13:22) (100% - 100%)      CAPILLARY BLOOD GLUCOSE  POCT Blood Glucose.: 83 mg/dL (04 Nov 2022 15:14)  POCT Blood Glucose.: 80 mg/dL (04 Nov 2022 08:04)     ==================>> LAB AND IMAGING <<==================                        7.8    10.57 )-----------( 375      ( 04 Nov 2022 09:40 )             27.4        Hemoglobin:   7.8 <<==,  6.9 <<==,  7.5 <<==,  7.3 <<==,  7.7 <<==    11-04    136  |  100  |  17  ----------------------------<  83  4.3   |  25  |  0.82    Ca    9.9      04 Nov 2022 05:30  Phos  3.3     11-04  Mg     2.00     11-04      WBC count:   10.57 <<== ,  9.70 <<== ,  9.79 <<== ,  10.75 <<== ,  9.59 <<==   Hemoglobin:   7.8 <<==,  6.9 <<==,  7.5 <<==,  7.3 <<==,  7.7 <<==  platelets:  375 <==, 334 <==, 354 <==, 366 <==, 382 <==, 399 <==    Creatinine:  0.82  <<==, 0.94  <<==, 0.86  <<==, 0.83  <<==  Sodium:   136  <==, 138  <==, 136  <==, 136  <==    ___________________________________________________________________________________  ===============>>  A S S E S S M E N T   A N D   P L A N <<===============  ------------------------------------------------------------------------------------------    · Assessment	  56 year old female with PMHx of Asthma, Anemia, Bladder cancer with 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     Problem/Plan - 1:  ·  Problem: low back / flank and chest pain.   ·  Plan: acute on chronic, involving both hips, with left worse than right. Affecting mobility. Not controlled by Tramadol and Tylenol and even morphine, but pt is declining other meds   CT scan of abdomen and pelvis: no evidence of osseous metastatic disease.  discussion as above       chest pain is atypical.. pain is not cardiac: likely from costochondritis, gas and constipation >> bowel regimen given ... ) :  bowel regimen and simethicone as ordered : encouraged   increase ambulation  as able   palliative follow up and mgmt  appreciated   bowel regimen for constipation   anti gas meds as ordered    Problem/Plan - 2:  ·  Problem: Hemoptysis. on and off, small amounts and appears to be less..   CT demonstrates: Numerous bilateral pulmonary metastases; mild interval increase in size of a few of the largest pulmonary metastases compared to 9/20/2022.  - Monitor for now  - pt planned for OP folowup with oncology for treatment / chemo vs immunotherapy     Problem/Plan - 3:  ·  Problem: h/o Hypoglycemia.   ·  Plan: glucose to 59 via FS; non-diabetic, s/p D-50 in the ED  diet encouraged along with supplements  - Monitor fingersticks.    Problem/Plan - 4:  ·  Problem: Primary cancer of bladder with metastasis to other site.   ·  Plan: with metastases and has left hydronephrosis and vaginal bleeding due to mass invasion to cavity   s/p right nephrostomy tube in place draining w/o difficulty  - Oncology consult / follow up ( pt states she will be changing oncologists from [facility], Dr Jones, to [facility] / [facility])    cancer has extended to the vagina causing some bleeding as above     pt declined TVUS and GYN exam before ( probably wise as likely will cause more bleeding)        GYN appreciated , there is a mass in the vaginal canal, likely cause of bleeding           no additional benefit from a  consult at this time     Chronic anemia / anemia of chronic disease     monitor as seems to be declinig..     pt today agreeable to blood transfusion >> transfuse one unite today and monitor      pt post IV Iron       Problem/Plan - 5:  ·  Problem: Slow transit constipation.   ·  Plan: no bowel movement for the past 3 days while on opiate  - Bowel regimen as ordered   - Monitor BM.  - treat constipation : also offered Linzess  but declined; agreed to mineral oil :  sister to buy and bring in     Problem/Plan - 6:  ·  Problem: Protein calorie malnutrition.   ·  Plan: Poor PO intake, confused/frustrated as to what to eat at present. no longer taking MVI; will restart    Problem/Plan - 7:  ·  Problem: Prophylactic measure.   ·  Plan: DVT ppx: will do SCD for now, will switch to Lovenox  no PT needs.    discharge planing when pain better controlled and + regular BMs     --------------------------------------------  Case discussed with pt, NP...  Education given on findings and plan of care  ___________________________  H. NIEVES Ochoa.  Pager: [phone]

## 2022-11-05 NOTE — PROGRESS NOTE ADULT - ASSESSMENT
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      Problem/Plan - 1:  ·  Problem: low back / flank and chest pain.   ·  Plan: acute on chronic, involving both hips, with left worse than right. Affecting mobility. Not controlled by Tramadol and Tylenol and even morphine, but pt is declining other meds   CT scan of abdomen and pelvis: no evidence of osseous metastatic disease.  discussion as above       chest pain is atypical.. pain is not cardiac: likely from costochondritis, gas and constipation >> bowel regimen given ... ) :  bowel regimen and simethicone as ordered : encouraged   increase ambulation  as able   palliative follow up and mgmt  appreciated   bowel regimen for constipation   anti gas meds as ordered     Problem/Plan - 2:  ·  Problem: Hemoptysis. on and off, small amounts and appears to be less..   CT demonstrates: Numerous bilateral pulmonary metastases; mild interval increase in size of a few of the largest pulmonary metastases compared to 9/20/2022.  - Monitor for now  - pt planned for OP folowup with oncology for treatment / chemo vs immunotherapy      Problem/Plan - 3:  ·  Problem: h/o Hypoglycemia.   ·  Plan: glucose to 59 via FS; non-diabetic, s/p D-50 in the ED  diet encouraged along with supplements  - Monitor fingersticks.     Problem/Plan - 4:  ·  Problem: Primary cancer of bladder with metastasis to other site.   ·  Plan: with metastases and has left hydronephrosis and vaginal bleeding due to mass invasion to cavity   s/p right nephrostomy tube in place draining w/o difficulty  - Oncology consult / follow up ( pt states she will be changing oncologists from [facility], Dr Jones, to [facility] / [facility])    cancer has extended to the vagina causing some bleeding as above     pt declined TVUS and GYN exam before ( probably wise as likely will cause more bleeding)        GYN appreciated , there is a mass in the vaginal canal, likely cause of bleeding           no additional benefit from a  consult at this time     Chronic anemia / anemia of chronic disease     monitor as seems to be declinig..     pt today agreeable to blood transfusion >> transfuse one unite today and monitor      pt post IV Iron        Problem/Plan - 5:  ·  Problem: Slow transit constipation.   ·  Plan: no bowel movement for the past 3 days while on opiate  - Bowel regimen as ordered   - Monitor BM.  - treat constipation : also offered Linzess  but declined; agreed to mineral oil :  sister to buy and bring in      Problem/Plan - 6:  ·  Problem: Protein calorie malnutrition.   ·  Plan: Poor PO intake, confused/frustrated as to what to eat at present. no longer taking MVI; will restart     Problem/Plan - 7:  ·  Problem: Prophylactic measure.   ·  Plan: DVT ppx: will do SCD for now, will switch to Lovenox  no PT needs.    discharge planing when pain better controlled and + regular BMs

## 2022-11-05 NOTE — PROGRESS NOTE ADULT - SUBJECTIVE AND OBJECTIVE BOX
Date of Service  : 11-05-22     INTERVAL HPI/OVERNIGHT EVENTS: i feel okay .  Vital Signs Last 24 Hrs  T(C): 36.7 (05 Nov 2022 13:49), Max: 36.7 (05 Nov 2022 05:28)  T(F): 98.1 (05 Nov 2022 13:49), Max: 98.1 (05 Nov 2022 05:28)  HR: 68 (05 Nov 2022 13:49) (64 - 68)  BP: 128/65 (05 Nov 2022 13:49) (112/63 - 128/65)  BP(mean): --  RR: 16 (05 Nov 2022 05:28) (16 - 16)  SpO2: 100% (05 Nov 2022 13:49) (99% - 100%)    Parameters below as of 05 Nov 2022 13:49  Patient On (Oxygen Delivery Method): room air      I&O's Summary    04 Nov 2022 07:01  -  05 Nov 2022 07:00  --------------------------------------------------------  IN: 240 mL / OUT: 300 mL / NET: -60 mL      MEDICATIONS  (STANDING):  acetaminophen     Tablet .. 650 milliGRAM(s) Oral every 6 hours  bisacodyl 5 milliGRAM(s) Oral at bedtime  chlorhexidine 2% Cloths 1 Application(s) Topical <User Schedule>  cholecalciferol 2000 Unit(s) Oral daily  folic acid 1 milliGRAM(s) Oral daily  influenza   Vaccine 0.5 milliLiter(s) IntraMuscular once  lidocaine   4% Patch 1 Patch Transdermal every 24 hours  polyethylene glycol 3350 17 Gram(s) Oral daily  saline laxative (FLEET) Rectal Enema 1 Enema Rectal once  senna 2 Tablet(s) Oral at bedtime    MEDICATIONS  (PRN):  aluminum hydroxide/magnesium hydroxide/simethicone Suspension 30 milliLiter(s) Oral every 4 hours PRN Dyspepsia  bisacodyl Suppository 10 milliGRAM(s) Rectal daily PRN Constipation  melatonin 3 milliGRAM(s) Oral at bedtime PRN Insomnia  morphine   Solution 7.5 milliGRAM(s) Oral every 3 hours PRN Breakthrough pain  ondansetron Injectable 4 milliGRAM(s) IV Push every 8 hours PRN Nausea and/or Vomiting  simethicone 80 milliGRAM(s) Chew four times a day PRN Gas    LABS:                        9.8    11.29 )-----------( 365      ( 05 Nov 2022 07:20 )             32.8     11-05    135  |  98  |  16  ----------------------------<  86  4.6   |  25  |  0.86    Ca    10.5      05 Nov 2022 07:20  Phos  3.3     11-04  Mg     2.00     11-04          CAPILLARY BLOOD GLUCOSE      POCT Blood Glucose.: 83 mg/dL (04 Nov 2022 15:14)          REVIEW OF SYSTEMS:  CONSTITUTIONAL: No fever, weight loss, or fatigue  EYES: No eye pain, visual disturbances, or discharge  ENMT:  No difficulty hearing, tinnitus, vertigo; No sinus or throat pain  NECK: No pain or stiffness  RESPIRATORY: No cough, wheezing, chills or hemoptysis; No shortness of breath  CARDIOVASCULAR: No chest pain, palpitations, dizziness, or leg swelling  GASTROINTESTINAL: No abdominal or epigastric pain. No nausea, vomiting, or hematemesis; But  constipation. No melena or     Consultant(s) Notes Reviewed:  [x ] YES  [ ] NO    PHYSICAL EXAM:  GENERAL: NAD, cachetic  ,not in any distress ,  HEAD:  Atraumatic, Normocephalic  NECK: Supple, No JVD, Normal thyroid  NERVOUS SYSTEM:  Alert & Oriented X3, No focal deficit   CHEST/LUNG: Good air entry bilateral with no  rales, rhonchi, wheezing, or rubs  HEART: Regular rate and rhythm; No murmurs, rubs, or gallops  ABDOMEN: Soft, Nontender, Nondistended; Bowel sounds present  EXTREMITIES:  2+ Peripheral Pulses, No clubbing, cyanosis, or edema  Lloyd +  Care Discussed with Consultants/Other Providers [ x] YES  [ ] NO

## 2022-11-06 NOTE — CHART NOTE - NSCHARTNOTEFT_GEN_A_CORE
Pt with COVID PCR positive on 11/5, pt asymptomatic, Pt with a prior +COVID PCR on 9/26. Discussed with Infection control RN Yumi, Pt with prior COVID + <90 days, no need for Isolation. Pt made aware, Dr. Ochoa informed.

## 2022-11-06 NOTE — PROGRESS NOTE ADULT - ASSESSMENT
_________________________________________________________________________________________  ========>>  M E D I C A L   A T T E N D I N G    F O L L O W  U P  N O T E  <<=========  -----------------------------------------------------------------------------------------------------    - Patient seen and examined by me earlier today.   - In summary,  JEF HOUSE is a 56y year old woman admitted with back / flank pain  - Patient today overall doing the same, had a small , hard BM, intermittent pain i left flank area and mid chest pain ..      pt hysterical today as tested positive for COVID-19 ( most likely residual from prior infection ) >> pt reassured emotional support given     ==================>> REVIEW OF SYSTEM <<=================    GEN: no fever, no chills, on and off pain as above , states morphine 7.5 mg too much, wants only 5 mg + 2 breakthrough   RESP: no SOB, no cough, no sputum  CVS: ++ chest / upper abd pain, no palpitations, no edema  GI: abdominal pain, no nausea, constipation as above   : no dysuria, no frequency, no hematuria in tube or voiding      + chronic vaginal bleeding   Neuro: no headache, no dizziness  Derm : no itching, no rash    ==================>> PHYSICAL EXAM <<=================    GEN: A&O X 3 , NAD , uncomfortable, pleasant, calm , cachectic , pt encouraged to take meds as needed for comfort and bowel regimen   HEENT: NCAT, PERRL, MMM, hearing intact  Neck: supple , no JVD appreciated  CVS: S1S2 , regular , No M/R/G appreciated  PULM: CTA B/L,  no W/R/R appreciated  ABD.: soft. non tender, non distended... encouraged pt to take bowel regimen   Extrem: intact pulses , no edema     nephrostomy in place and draining to bag   PSYCH : normal mood,  not anxious                             ( Note written / Date of service 11-06-22 )    ==================>> MEDICATIONS <<====================    acetaminophen     Tablet .. 650 milliGRAM(s) Oral every 6 hours  bisacodyl 5 milliGRAM(s) Oral at bedtime  chlorhexidine 2% Cloths 1 Application(s) Topical <User Schedule>  cholecalciferol 2000 Unit(s) Oral daily  folic acid 1 milliGRAM(s) Oral daily  influenza   Vaccine 0.5 milliLiter(s) IntraMuscular once  lidocaine   4% Patch 1 Patch Transdermal every 24 hours  polyethylene glycol 3350 17 Gram(s) Oral daily  saline laxative (FLEET) Rectal Enema 1 Enema Rectal once  senna 2 Tablet(s) Oral at bedtime    MEDICATIONS  (PRN):  aluminum hydroxide/magnesium hydroxide/simethicone Suspension 30 milliLiter(s) Oral every 4 hours PRN Dyspepsia  bisacodyl Suppository 10 milliGRAM(s) Rectal daily PRN Constipation  melatonin 3 milliGRAM(s) Oral at bedtime PRN Insomnia  morphine   Solution 5 milliGRAM(s) Oral every 3 hours PRN Moderate and Severe pain  morphine   Solution 2.5 milliGRAM(s) Oral every 3 hours PRN BREAKETHROUGH PAIN  ondansetron Injectable 4 milliGRAM(s) IV Push every 8 hours PRN Nausea and/or Vomiting  simethicone 80 milliGRAM(s) Chew four times a day PRN Gas    ___________  Active diet:  Diet, Regular:   High Fiber (HIFIBER)  Supplement Feeding Modality:  Oral  Ensure Plus High Protein Cans or Servings Per Day:  2       Frequency:  Daily  ___________________    ==================>> VITAL SIGNS <<==================    Vital Signs Last 24 HrsT(C): 36.6 (11-06-22 @ 06:12)  T(F): 97.8 (11-06-22 @ 06:12), Max: 97.9 (11-05-22 @ 20:56)  HR: 68 (11-06-22 @ 06:12) (68 - 69)  BP: 127/61 (11-06-22 @ 06:12)  RR: 16 (11-06-22 @ 06:12) (16 - 17)  SpO2: 100% (11-06-22 @ 06:12) (100% - 100%)       ==================>> LAB AND IMAGING <<==================                        9.5    9.29  )-----------( 381      ( 06 Nov 2022 06:28 )             32.2        11-06    136  |  98  |  18  ----------------------------<  80  4.1   |  26  |  0.82    Ca    10.2      06 Nov 2022 06:28  Phos  3.3     11-06  Mg     2.00     11-06      WBC count:   9.29 <<== ,  11.29 <<== ,  10.57 <<== ,  9.70 <<== ,  9.79 <<== ,  10.75 <<==   Hemoglobin:   9.5 <<==,  9.8 <<==,  7.8 <<==,  6.9 <<==,  7.5 <<==,  7.3 <<==  platelets:  381 <==, 365 <==, 375 <==, 334 <==, 354 <==, 366 <==    Creatinine:  0.82  <<==, 0.86  <<==, 0.82  <<==, 0.94  <<==  Sodium:   136  <==, 135  <==, 136  <==, 138  <==      ___________________________________________________________________________________  ===============>>  A S S E S S M E N T   A N D   P L A N <<===============  ------------------------------------------------------------------------------------------    · Assessment	  56 year old female with PMHx of Asthma, Anemia, Bladder cancer with 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     Problem/Plan - 1:  ·  Problem: low back / flank and chest pain.   pain mgmt per palliative team  bowel regimen   increase ambulation  as able   anti gas meds as ordered    Problem/Plan - 2:  ·  Problem: Hemoptysis. on and off,     seems resolved  CT demonstrates: Numerous bilateral pulmonary metastases; mild interval increase in size of a few of the largest pulmonary metastases compared to 9/20/2022.  - Monitor for now    + COVID    pt asymptomatic    likely residual from prior / recent infection    would not treat or isolate     Problem/Plan - 3:  ·  Problem: h/o Hypoglycemia.   ·  Plan: glucose to 59 via FS; non-diabetic, s/p D-50 in the ED  diet encouraged along with supplements  - Monitor fingersticks.    Problem/Plan - 4:  ·  Problem: Primary cancer of bladder with metastasis to other site.   ·  Plan: with metastases and has left hydronephrosis and vaginal bleeding due to mass invasion to cavity   s/p right nephrostomy tube in place draining w/o difficulty  - Oncology consult / follow up ( pt states she will be changing oncologists from [facility], Dr Jones, to [facility] / [facility])    cancer has extended to the vagina causing some bleeding as above     pt declined TVUS and GYN exam before ( probably wise as likely will cause more bleeding)        GYN appreciated , there is a mass in the vaginal canal, likely cause of bleeding           no additional benefit from a  consult at this time     >> pt is due for nephrostomy tube change >> IR eval  / consult     Chronic anemia / anemia of chronic disease     monitor as seems to be declinig..     pt today agreeable to blood transfusion >> transfuse one unite today and monitor      pt post IV Iron       Problem/Plan - 5:  ·  Problem: Slow transit constipation.   ·  Plan: no bowel movement for the past 3 days while on opiate  - Bowel regimen as ordered   - Monitor BM.  - treat constipation : also offered Linzess  but declined; agreed to mineral oil :  sister to buy and bring in     Problem/Plan - 6:  ·  Problem: Protein calorie malnutrition.   ·  Plan: Poor PO intake, confused/frustrated as to what to eat at present. no longer taking MVI; will restart    Problem/Plan - 7:  ·  Problem: Prophylactic measure.   ·  Plan: DVT ppx: will do SCD for now, will switch to Lovenox  no PT needs.    discharge planing when pain better controlled and + regular BMs     --------------------------------------------  Case discussed with pt, NP...  Education given on findings and plan of care  ___________________________  H. NIEVES Ochoa.  Pager: [phone]

## 2022-11-07 NOTE — CHART NOTE - NSCHARTNOTEFT_GEN_A_CORE
Pt refused to go to IR for R NT. Pt stated that NT is patented and wants to go back to [place] where she normally goes for her exchange.

## 2022-11-07 NOTE — CHART NOTE - NSCHARTNOTEFT_GEN_A_CORE
Patient Age: 56    Patient Gender: F    Procedure (including site/side if known): R PCN exchange    Diagnosis/Indication:  Hydronephrosis    Interventional Radiology Attending Physician: Dr. 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    Pertinent Labs:                        9.5    9.29  )-----------( 381      ( 06 Nov 2022 06:28 )             32.2     11-06    136  |  98  |  18  ----------------------------<  80  4.1   |  26  |  0.82    Ca    10.2      06 Nov 2022 06:28  Phos  3.3     11-06  Mg     2.00     11-06          Patient and Family aware? yes      Attending/Resident/NP/PA Soledad Ye ANP-C      Approval Obtained From:      Contact:   [phone]                            Date:  11/7/22                       time:09:47

## 2022-11-07 NOTE — PROGRESS NOTE ADULT - ASSESSMENT
( Note written / Date of service 11-07-22 )    ==================>> MEDICATIONS <<====================    acetaminophen     Tablet .. 650 milliGRAM(s) Oral every 6 hours  bisacodyl 5 milliGRAM(s) Oral at bedtime  chlorhexidine 2% Cloths 1 Application(s) Topical <User Schedule>  cholecalciferol 2000 Unit(s) Oral daily  folic acid 1 milliGRAM(s) Oral daily  influenza   Vaccine 0.5 milliLiter(s) IntraMuscular once  lidocaine   4% Patch 1 Patch Transdermal every 24 hours  polyethylene glycol 3350 17 Gram(s) Oral daily  senna 2 Tablet(s) Oral at bedtime    MEDICATIONS  (PRN):  aluminum hydroxide/magnesium hydroxide/simethicone Suspension 30 milliLiter(s) Oral every 4 hours PRN Dyspepsia  bisacodyl Suppository 10 milliGRAM(s) Rectal daily PRN Constipation  melatonin 3 milliGRAM(s) Oral at bedtime PRN Insomnia  morphine   Solution 5 milliGRAM(s) Oral every 3 hours PRN Moderate and Severe pain  morphine   Solution 2.5 milliGRAM(s) Oral every 3 hours PRN BREAKETHROUGH PAIN  ondansetron Injectable 4 milliGRAM(s) IV Push every 8 hours PRN Nausea and/or Vomiting  simethicone 80 milliGRAM(s) Chew four times a day PRN Gas    ___________  Active diet:  Diet, Regular:   High Fiber (HIFIBER)  Supplement Feeding Modality:  Oral  Ensure Plus High Protein Cans or Servings Per Day:  2       Frequency:  Daily  ___________________    ==================>> VITAL SIGNS <<==================    Vital Signs Last 24 HrsT(C): 36.7 (11-07-22 @ 13:54)  T(F): 98 (11-07-22 @ 13:54), Max: 98.3 (11-06-22 @ 18:37)  HR: 68 (11-07-22 @ 13:54) (66 - 75)  BP: 123/66 (11-07-22 @ 13:54)  RR: 17 (11-07-22 @ 13:54) (17 - 18)  SpO2: 100% (11-07-22 @ 13:54) (100% - 100%)      CAPILLARY BLOOD GLUCOSE         ==================>> LAB AND IMAGING <<==================                        9.5    9.29  )-----------( 381      ( 06 Nov 2022 06:28 )             32.2        11-06    136  |  98  |  18  ----------------------------<  80  4.1   |  26  |  0.82    Ca    10.2      06 Nov 2022 06:28  Phos  3.3     11-06  Mg     2.00     11-06      WBC count:   9.29 <<== ,  11.29 <<== ,  10.57 <<== ,  9.70 <<== ,  9.79 <<==   Hemoglobin:   9.5 <<==,  9.8 <<==,  7.8 <<==,  6.9 <<==,  7.5 <<==  platelets:  381 <==, 365 <==, 375 <==, 334 <==, 354 <==, 366 <==    Creatinine:  0.82  <<==, 0.86  <<==, 0.82  <<==, 0.94  <<==  Sodium:   136  <==, 135  <==, 136  <==, 138  <==       AST:               ALT:             AP:             Bili:            ____________________________    M I C R O B I O L O G Y :      COVID-19 PCR: Detected (11-05-22 @ 05:25)  COVID-19 PCR: NotDetec (10-30-22 @ 09:38)     _________________________________________________________________________________________  ========>>  M E D I C A L   A T T E N D I N G    F O L L O W  U P  N O T E  <<=========  -----------------------------------------------------------------------------------------------------    - Patient seen and examined by me earlier today.   - In summary,  JEF HOUSE is a 56y year old woman admitted with back / flank pain  - Patient today overall doing the same, had a BM, intermittent pain i left flank area and mid chest pain ..     ==================>> REVIEW OF SYSTEM <<=================    GEN: no fever, no chills, on and off pain as above , states morphine 7.5 mg too much, wants only 5 mg + 2 breakthrough   RESP: no SOB, no cough, no sputum  CVS: ++ chest / upper abd pain, no palpitations, no edema  GI: abdominal pain, no nausea, constipation as above   : no dysuria, no frequency, no hematuria in tube or voiding      + chronic vaginal bleeding   Neuro: no headache, no dizziness  Derm : no itching, no rash    ==================>> PHYSICAL EXAM <<=================    GEN: A&O X 3 , NAD , uncomfortable, pleasant, calm , cachectic , pt encouraged to take meds as needed for comfort and bowel regimen   HEENT: NCAT, PERRL, MMM, hearing intact  Neck: supple , no JVD appreciated  CVS: S1S2 , regular , No M/R/G appreciated  PULM: CTA B/L,  no W/R/R appreciated  ABD.: soft. non tender, non distended... encouraged pt to take bowel regimen   Extrem: intact pulses , no edema     nephrostomy in place and draining to bag   PSYCH : normal mood,  not anxious                              ( Note written / Date of service 11-07-22 )    ==================>> MEDICATIONS <<====================    acetaminophen     Tablet .. 650 milliGRAM(s) Oral every 6 hours  bisacodyl 5 milliGRAM(s) Oral at bedtime  chlorhexidine 2% Cloths 1 Application(s) Topical <User Schedule>  cholecalciferol 2000 Unit(s) Oral daily  folic acid 1 milliGRAM(s) Oral daily  influenza   Vaccine 0.5 milliLiter(s) IntraMuscular once  lidocaine   4% Patch 1 Patch Transdermal every 24 hours  polyethylene glycol 3350 17 Gram(s) Oral daily  senna 2 Tablet(s) Oral at bedtime    MEDICATIONS  (PRN):  aluminum hydroxide/magnesium hydroxide/simethicone Suspension 30 milliLiter(s) Oral every 4 hours PRN Dyspepsia  bisacodyl Suppository 10 milliGRAM(s) Rectal daily PRN Constipation  melatonin 3 milliGRAM(s) Oral at bedtime PRN Insomnia  morphine   Solution 5 milliGRAM(s) Oral every 3 hours PRN Moderate and Severe pain  morphine   Solution 2.5 milliGRAM(s) Oral every 3 hours PRN BREAKETHROUGH PAIN  ondansetron Injectable 4 milliGRAM(s) IV Push every 8 hours PRN Nausea and/or Vomiting  simethicone 80 milliGRAM(s) Chew four times a day PRN Gas    ___________  Active diet:  Diet, Regular:   High Fiber (HIFIBER)  Supplement Feeding Modality:  Oral  Ensure Plus High Protein Cans or Servings Per Day:  2       Frequency:  Daily  ___________________    ==================>> VITAL SIGNS <<==================    Vital Signs Last 24 HrsT(C): 36.7 (11-07-22 @ 13:54)  T(F): 98 (11-07-22 @ 13:54), Max: 98.3 (11-06-22 @ 18:37)  HR: 68 (11-07-22 @ 13:54) (66 - 75)  BP: 123/66 (11-07-22 @ 13:54)  RR: 17 (11-07-22 @ 13:54) (17 - 18)  SpO2: 100% (11-07-22 @ 13:54) (100% - 100%)       ==================>> LAB AND IMAGING <<==================                        9.5    9.29  )-----------( 381      ( 06 Nov 2022 06:28 )             32.2        11-06    136  |  98  |  18  ----------------------------<  80  4.1   |  26  |  0.82    Ca    10.2      06 Nov 2022 06:28  Phos  3.3     11-06  Mg     2.00     11-06      WBC count:   9.29 <<== ,  11.29 <<== ,  10.57 <<== ,  9.70 <<== ,  9.79 <<==   Hemoglobin:   9.5 <<==,  9.8 <<==,  7.8 <<==,  6.9 <<==,  7.5 <<==  platelets:  381 <==, 365 <==, 375 <==, 334 <==, 354 <==, 366 <==    Creatinine:  0.82  <<==, 0.86  <<==, 0.82  <<==, 0.94  <<==  Sodium:   136  <==, 135  <==, 136  <==, 138  <==      ___________________________________________________________________________________  ===============>>  A S S E S S M E N T   A N D   P L A N <<===============  ------------------------------------------------------------------------------------------    · Assessment	  56 year old female with PMHx of Asthma, Anemia, Bladder cancer with 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     Problem/Plan - 1:  ·  Problem: low back / flank and chest pain.   pain mgmt per palliative team  bowel regimen   increase ambulation  as able   anti gas meds as ordered    Problem/Plan - 2:  ·  Problem: Hemoptysis. on and off,     seems resolved  CT demonstrates: Numerous bilateral pulmonary metastases; mild interval increase in size of a few of the largest pulmonary metastases compared to 9/20/2022.  - Monitor for now    + COVID    pt asymptomatic    likely residual from prior / recent infection    would not treat or isolate     Problem/Plan - 3:  ·  Problem: h/o Hypoglycemia.   ·  Plan: glucose to 59 via FS; non-diabetic, s/p D-50 in the ED  diet encouraged along with supplements  - Monitor fingersticks.    Problem/Plan - 4:  ·  Problem: Primary cancer of bladder with metastasis to other site.   ·  Plan: with metastases and has left hydronephrosis and vaginal bleeding due to mass invasion to cavity   s/p right nephrostomy tube in place draining w/o difficulty  - Oncology consult / follow up ( pt states she will be changing oncologists from [facility], Dr Jones, to [facility])    cancer has extended to the vagina causing some bleeding as above     pt declined TVUS and GYN exam before ( probably wise as likely will cause more bleeding)        GYN appreciated , there is a mass in the vaginal canal, likely cause of bleeding           no additional benefit from a  consult at this time     >> pt is due for nephrostomy tube change >> IR eval  / consult  >> pt now declining      Chronic anemia / anemia of chronic disease     monitor as seems to be declinig..     pt today agreeable to blood transfusion >> transfuse one unite today and monitor      pt post IV Iron       Problem/Plan - 5:  ·  Problem: Slow transit constipation.   ·  Plan: no bowel movement for the past 3 days while on opiate  - Bowel regimen as ordered   - Monitor BM.  - treat constipation : also offered Linzess  but declined; agreed to mineral oil :  sister to buy and bring in     Problem/Plan - 6:  ·  Problem: Protein calorie malnutrition.   ·  Plan: Poor PO intake, confused/frustrated as to what to eat at present. no longer taking MVI; will restart    Problem/Plan - 7:  ·  Problem: Prophylactic measure.   ·  Plan: DVT ppx: will do SCD for now, will switch to Lovenox  no PT needs.    discharge planing when pain better controlled and + regular BMs     --------------------------------------------  Case discussed with pt, NP...  Education given on findings and plan of care  ___________________________  H. NIEVES Ochoa.  Pager: [phone]

## 2022-11-07 NOTE — CHART NOTE - NSCHARTNOTEFT_GEN_A_CORE
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 IR consulted for R PCN exchange, last exhanged on 8/22/22.     -- IR will plan to perform routine right PCN exchange 11/7/22  -- please complete IR pre-procedure note  -- please place IR procedure request order under Dr. Halaibeh

## 2022-11-08 NOTE — CHART NOTE - NSCHARTNOTEFT_GEN_A_CORE
Case discussed with attending Dr. Ochoa. Patient medically optimized for discharge home with home services today.

## 2022-11-08 NOTE — PROGRESS NOTE ADULT - ASSESSMENT
_________________________________________________________________________________________  ========>>  M E D I C A L   A T T E N D I N G    F O L L O W  U P  N O T E  <<=========  -----------------------------------------------------------------------------------------------------    - Patient seen and examined by me earlier today.   - In summary,  JEF HOUSE is a 56y year old woman admitted with back / flank pain  - Patient today overall doing the same, intermittent pain i left flank area and mid chest pain .. didn't sleep well     ==================>> REVIEW OF SYSTEM <<=================    GEN: no fever, no chills, on and off pain as above , taking morphine 5 mg on and off   RESP: no SOB, no cough, no sputum  CVS: ++ chest / upper abd pain, no palpitations  GI: abdominal pain, no nausea, constipation as above   : no dysuria, no frequency, no hematuria in tube or voiding      + chronic vaginal bleeding   Neuro: no headache, no dizziness  Derm : no itching, no rash    ==================>> PHYSICAL EXAM <<=================    GEN: A&O X 3 , NAD , uncomfortable, pleasant, calm , cachectic , pt encouraged to take meds as needed for comfort and bowel regimen   HEENT: NCAT, PERRL, MMM, hearing intact  Neck: supple , no JVD appreciated  CVS: S1S2 , regular , No M/R/G appreciated  PULM: CTA B/L,  no W/R/R appreciated  ABD.: soft. non tender, non distended... encouraged pt to take bowel regimen   Extrem: intact pulses , no edema     nephrostomy in place and draining to bag   PSYCH : normal mood,  not anxious                               ( Note written / Date of service 11-08-22 )    ==================>> MEDICATIONS <<====================    acetaminophen     Tablet .. 650 milliGRAM(s) Oral every 6 hours  bisacodyl 5 milliGRAM(s) Oral at bedtime  chlorhexidine 2% Cloths 1 Application(s) Topical <User Schedule>  cholecalciferol 2000 Unit(s) Oral daily  folic acid 1 milliGRAM(s) Oral daily  influenza   Vaccine 0.5 milliLiter(s) IntraMuscular once  lidocaine   4% Patch 1 Patch Transdermal every 24 hours  polyethylene glycol 3350 17 Gram(s) Oral daily  senna 2 Tablet(s) Oral at bedtime    MEDICATIONS  (PRN):  aluminum hydroxide/magnesium hydroxide/simethicone Suspension 30 milliLiter(s) Oral every 4 hours PRN Dyspepsia  bisacodyl Suppository 10 milliGRAM(s) Rectal daily PRN Constipation  melatonin 3 milliGRAM(s) Oral at bedtime PRN Insomnia  morphine   Solution 5 milliGRAM(s) Oral every 3 hours PRN Moderate and Severe pain  morphine   Solution 2.5 milliGRAM(s) Oral every 3 hours PRN BREAKETHROUGH PAIN  ondansetron Injectable 4 milliGRAM(s) IV Push every 8 hours PRN Nausea and/or Vomiting  simethicone 80 milliGRAM(s) Chew four times a day PRN Gas    ==================>> VITAL SIGNS <<==================    Vital Signs Last 24 HrsT(C): 36.8 (11-08-22 @ 13:12)  T(F): 98.3 (11-08-22 @ 13:12), Max: 98.3 (11-08-22 @ 06:20)  HR: 67 (11-08-22 @ 13:12) (63 - 67)  BP: 131/83 (11-08-22 @ 13:12)  RR: 17 (11-08-22 @ 13:12) (17 - 17)  SpO2: 100% (11-08-22 @ 13:12) (100% - 100%)       ==================>> LAB AND IMAGING <<==================                        9.3    9.71  )-----------( 342      ( 08 Nov 2022 03:30 )             31.2        11-08    133<L>  |  99  |  19  ----------------------------<  94  4.7   |  23  |  1.11    Ca    10.1      08 Nov 2022 03:30      WBC count:   9.71 <<== ,  9.29 <<== ,  11.29 <<== ,  10.57 <<== ,  9.70 <<==   Hemoglobin:   9.3 <<==,  9.5 <<==,  9.8 <<==,  7.8 <<==,  6.9 <<==  platelets:  342 <==, 381 <==, 365 <==, 375 <==, 334 <==, 354 <==    Creatinine:  1.11  <<==, 0.82  <<==, 0.86  <<==, 0.82  <<==  Sodium:   133  <==, 136  <==, 135  <==, 136  <==      ___________________________________________________________________________________  ===============>>  A S S E S S M E N T   A N D   P L A N <<===============  ------------------------------------------------------------------------------------------    · Assessment	  56 year old female with PMHx of Asthma, Anemia, Bladder cancer with 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     Problem/Plan - 1:  ·  Problem: low back / flank and chest pain.   pain mgmt per palliative team  bowel regimen   increase ambulation  as able   anti gas meds as ordered    Problem/Plan - 2:  ·  Problem: Hemoptysis. on and off,     seems resolved  CT demonstrates: Numerous bilateral pulmonary metastases; mild interval increase in size of a few of the largest pulmonary metastases compared to 9/20/2022.  - Monitor for now    + COVID    pt asymptomatic    likely residual from prior / recent infection    would not treat or isolate     Problem/Plan - 3:  ·  Problem: h/o Hypoglycemia.   ·  Plan: glucose to 59 via FS; non-diabetic, s/p D-50 in the ED  diet encouraged along with supplements  - Monitor fingersticks.    Problem/Plan - 4:  ·  Problem: Primary cancer of bladder with metastasis to other site.   ·  Plan: with metastases and has left hydronephrosis and vaginal bleeding due to mass invasion to cavity   s/p right nephrostomy tube in place draining w/o difficulty  - Oncology consult / follow up ( pt states she will be changing oncologists from [facility], Dr Jones, to [facility])    cancer has extended to the vagina causing some bleeding as above     pt declined TVUS and GYN exam before ( probably wise as likely will cause more bleeding)        GYN appreciated , there is a mass in the vaginal canal, likely cause of bleeding           no additional benefit from a  consult at this time     >> pt is due for nephrostomy tube change >> IR eval  / consult  >> pt now declining      Chronic anemia / anemia of chronic disease     monitor as seems to be declinig..     pt today agreeable to blood transfusion >> transfuse one unite today and monitor      pt post IV Iron       Problem/Plan - 5:  ·  Problem: Slow transit constipation.   ·  Plan: no bowel movement for the past 3 days while on opiate  - Bowel regimen as ordered   - Monitor BM.  - encourage Po   - treat constipation : also offered Linzess  but declined; agreed to mineral oil :  sister to buy and bring in     Problem/Plan - 6:  ·  Problem: Protein calorie malnutrition.   ·  Plan: Poor PO intake in general   - as above     Problem/Plan - 7:  ·  Problem: Prophylactic measure.   ·  Plan: DVT ppx: will do SCD for now, will switch to Lovenox  no PT needs.    discharge planing     --------------------------------------------  Case discussed with pt, NP...  Education given on findings and plan of care  ___________________________  H. NIEVES Ochoa.  Pager: [phone]

## 2022-11-09 NOTE — PROGRESS NOTE ADULT - ASSESSMENT
( Note written / Date of service 11-09-22 )    ==================>> MEDICATIONS <<====================    acetaminophen     Tablet .. 650 milliGRAM(s) Oral every 6 hours  bisacodyl 5 milliGRAM(s) Oral at bedtime  chlorhexidine 2% Cloths 1 Application(s) Topical <User Schedule>  cholecalciferol 2000 Unit(s) Oral daily  folic acid 1 milliGRAM(s) Oral daily  influenza   Vaccine 0.5 milliLiter(s) IntraMuscular once  lidocaine   4% Patch 1 Patch Transdermal every 24 hours  polyethylene glycol 3350 17 Gram(s) Oral daily  senna 2 Tablet(s) Oral at bedtime    MEDICATIONS  (PRN):  aluminum hydroxide/magnesium hydroxide/simethicone Suspension 30 milliLiter(s) Oral every 4 hours PRN Dyspepsia  bisacodyl Suppository 10 milliGRAM(s) Rectal daily PRN Constipation  melatonin 3 milliGRAM(s) Oral at bedtime PRN Insomnia  morphine   Solution 5 milliGRAM(s) Oral every 3 hours PRN Moderate and Severe pain  morphine   Solution 2.5 milliGRAM(s) Oral every 3 hours PRN BREAKETHROUGH PAIN  ondansetron Injectable 4 milliGRAM(s) IV Push every 8 hours PRN Nausea and/or Vomiting  simethicone 80 milliGRAM(s) Chew four times a day PRN Gas    ___________  Active diet:  ___________________    ==================>> VITAL SIGNS <<==================    Vital Signs Last 24 HrsT(C): 36.9 (11-09-22 @ 15:37)  T(F): 98.4 (11-09-22 @ 15:37), Max: 98.4 (11-09-22 @ 15:37)  HR: 60 (11-09-22 @ 15:37) (60 - 64)  BP: 115/64 (11-09-22 @ 15:37)  RR: 17 (11-09-22 @ 15:37) (17 - 17)  SpO2: 100% (11-09-22 @ 15:37) (100% - 100%)      CAPILLARY BLOOD GLUCOSE         ==================>> LAB AND IMAGING <<==================                        9.3    9.71  )-----------( 342      ( 08 Nov 2022 03:30 )             31.2        11-08    133<L>  |  99  |  19  ----------------------------<  94  4.7   |  23  |  1.11    Ca    10.1      08 Nov 2022 03:30      WBC count:   9.71 <<== ,  9.29 <<== ,  11.29 <<==   Hemoglobin:   9.3 <<==,  9.5 <<==,  9.8 <<==  platelets:  342 <==, 381 <==, 365 <==, 375 <==, 334 <==    Creatinine:  1.11  <<==, 0.82  <<==, 0.86  <<==, 0.82  <<==  Sodium:   133  <==, 136  <==, 135  <==, 136  <==       AST:               ALT:             AP:             Bili:            ____________________________    M I C R O B I O L O G Y :      COVID-19 PCR: Detected (11-05-22 @ 05:25)  COVID-19 PCR: NotDetec (10-30-22 @ 09:38)     _________________________________________________________________________________________  ========>>  M E D I C A L   A T T E N D I N G    F O L L O W  U P  N O T E  <<=========  -----------------------------------------------------------------------------------------------------    - Patient seen and examined by me earlier today.   - In summary,  JEF HOUSE is a 56y year old woman admitted with back / flank pain  - Patient today overall doing the same, intermittent pain       offered pt other pain mgmt solutions , including Duloxetine but pt declined while crying with pain !!     ==================>> REVIEW OF SYSTEM <<=================    GEN: no fever, no chills, on and off pain as above , taking morphine 5 mg on and off   RESP: no SOB, no cough, no sputum  CVS: ++ chest / upper abd pain, no palpitations  GI: abdominal pain, no nausea, constipation as above   : no dysuria, no frequency, no hematuria in tube or voiding      + chronic vaginal bleeding on and off   Neuro: no headache, no dizziness  Derm : no itching, no rash    ==================>> PHYSICAL EXAM <<=================    GEN: A&O X 3 , NAD , uncomfortable, pleasant, calm , cachectic , pt encouraged to take meds as needed for comfort and bowel regimen   HEENT: NCAT, PERRL, MMM, hearing intact  Neck: supple , no JVD appreciated  CVS: S1S2 , regular , No M/R/G appreciated  PULM: CTA B/L,  no W/R/R appreciated  ABD.: soft. non tender, non distended... encouraged pt to take bowel regimen   Extrem: intact pulses , no edema     nephrostomy in place and draining to bag   PSYCH : normal mood,  not anxious                              ( Note written / Date of service 11-09-22 )    ==================>> MEDICATIONS <<====================    acetaminophen     Tablet .. 650 milliGRAM(s) Oral every 6 hours  bisacodyl 5 milliGRAM(s) Oral at bedtime  chlorhexidine 2% Cloths 1 Application(s) Topical <User Schedule>  cholecalciferol 2000 Unit(s) Oral daily  folic acid 1 milliGRAM(s) Oral daily  influenza   Vaccine 0.5 milliLiter(s) IntraMuscular once  lidocaine   4% Patch 1 Patch Transdermal every 24 hours  polyethylene glycol 3350 17 Gram(s) Oral daily  senna 2 Tablet(s) Oral at bedtime    MEDICATIONS  (PRN):  aluminum hydroxide/magnesium hydroxide/simethicone Suspension 30 milliLiter(s) Oral every 4 hours PRN Dyspepsia  bisacodyl Suppository 10 milliGRAM(s) Rectal daily PRN Constipation  melatonin 3 milliGRAM(s) Oral at bedtime PRN Insomnia  morphine   Solution 5 milliGRAM(s) Oral every 3 hours PRN Moderate and Severe pain  morphine   Solution 2.5 milliGRAM(s) Oral every 3 hours PRN BREAKETHROUGH PAIN  ondansetron Injectable 4 milliGRAM(s) IV Push every 8 hours PRN Nausea and/or Vomiting  simethicone 80 milliGRAM(s) Chew four times a day PRN Gas    ==================>> VITAL SIGNS <<==================    Vital Signs Last 24 HrsT(C): 36.9 (11-09-22 @ 15:37)  T(F): 98.4 (11-09-22 @ 15:37), Max: 98.4 (11-09-22 @ 15:37)  HR: 60 (11-09-22 @ 15:37) (60 - 64)  BP: 115/64 (11-09-22 @ 15:37)  RR: 17 (11-09-22 @ 15:37) (17 - 17)  SpO2: 100% (11-09-22 @ 15:37) (100% - 100%)         ==================>> LAB AND IMAGING <<==================                        9.3    9.71  )-----------( 342      ( 08 Nov 2022 03:30 )             31.2        11-08    133<L>  |  99  |  19  ----------------------------<  94  4.7   |  23  |  1.11    Ca    10.1      08 Nov 2022 03:30      WBC count:   9.71 <<== ,  9.29 <<== ,  11.29 <<==   Hemoglobin:   9.3 <<==,  9.5 <<==,  9.8 <<==  platelets:  342 <==, 381 <==, 365 <==, 375 <==, 334 <==    Creatinine:  1.11  <<==, 0.82  <<==, 0.86  <<==, 0.82  <<==  Sodium:   133  <==, 136  <==, 135  <==, 136  <==    ___________________________________________________________________________________  ===============>>  A S S E S S M E N T   A N D   P L A N <<===============  ------------------------------------------------------------------------------------------    · Assessment	  56 year old female with PMHx of Asthma, Anemia, Bladder cancer with 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     Problem/Plan - 1:  ·  Problem: low back / flank and chest pain.   pain mgmt per palliative team  bowel regimen   increase ambulation  as able   anti gas meds as ordered    Problem/Plan - 2:  ·  Problem: Hemoptysis. on and off, seems resolved now   CT demonstrates: Numerous bilateral pulmonary metastases; mild interval increase in size of a few of the largest pulmonary metastases compared to 9/20/2022.  - Monitor for now    + COVID    pt asymptomatic    likely residual from prior / recent infection    would not treat or isolate     Problem/Plan - 3:  ·  Problem: h/o Hypoglycemia.   ·  Plan: glucose to 59 via FS; non-diabetic, s/p D-50 in the ED  diet encouraged along with supplements  - Monitor fingersticks.    Problem/Plan - 4:  ·  Problem: Primary cancer of bladder with metastasis to other site.   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     pt today agreeable to blood transfusion >> transfuse one unite today and monitor      pt post IV Iron       Problem/Plan - 5:  ·  Problem: Slow transit constipation.   ·  Plan: no bowel movement for the past 3 days while on opiate  - Bowel regimen as ordered   - Monitor BM.  - encourage Po   - treat constipation : also offered Linzess  but declined; agreed to mineral oil :  sister to buy and bring in     Problem/Plan - 6:  ·  Problem: Protein calorie malnutrition.   ·  Plan: Poor PO intake in general   - as above     Problem/Plan - 7:  ·  Problem: Prophylactic measure.   ·  Plan: DVT ppx: will do SCD for now, will switch to Lovenox  no PT needs.    discharge planing     --------------------------------------------  Case discussed with pt, NP...  Education given on findings and plan of care  ___________________________  H. NIEVES Ochoa.  Pager: [phone]

## 2022-11-10 NOTE — CHART NOTE - NSCHARTNOTEFT_GEN_A_CORE
Source: Patient [X ]    Family [ ]     other [X ] electronic chart, RN     Diet : Diet, Regular:   High Fiber (HIFIBER)  Supplement Feeding Modality:  Oral  Ensure Plus High Protein Cans or Servings Per Day:  2       Frequency:  Daily (10-19-22 @ 11:46)      Nutrition Follow-up note, severe malnutrition. 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      Patient w/ fair po intake and taking PO supplement Ensure Plus 1-2 per day. C/o pain at time of encounter- did receive pain medication. Denies any nausea/vomiting or difficulty chewing and swallowing. +Constipation, last bowel movement on 11/7 on bowel regimen. Importance of having small frequent po intake as tolerated emphasized. PO supplement between meals encouraged. RD remains available, patient made aware.       Weight (kg): 38 (18 Oct 2022 18:03) No new weight. RN to obtain new weight for continue weight monitoring.   Height (cm): 162.6 (18 Oct 2022 18:03)  BMI (kg/m2): 14.4 (18 Oct 2022 18:03)  BSA (m2): 1.35 (18 Oct 2022 18:03)      Pertinent Medications: acetaminophen     Tablet ..  bisacodyl  cholecalciferol  folic acid  polyethylene glycol 3350  senna    Pertinent Labs:  11-08 Na133 mmol/L<L> Glu 94 mg/dL K+ 4.7 mmol/L Cr  1.11 mg/dL BUN 19 mg/dL 11-06 Phos 3.3 mg/dL 10-18 PAB 19 mg/dL<L>      Skin: No pressure ulcers/DTI noted in flowsheets.   No edema noted.     Estimated Needs:   [X ] no change since previous assessment  [ ] recalculated:       Previous Nutrition Diagnosis:     [X ] Malnutrition, Severe     Nutrition Diagnosis is [X ] ongoing  [ ] resolved [ ] not applicable       Additional Recommendations:     1. Continue current diet order, which remains appropriate at this time.   2. Monitor weights, labs, BM's, skin integrity, p.o. intake.   3. Please document % PO intake in nursing flowsheet.   4. Please Encourage po intake, assist with meals and menu selections, provide alternatives PRN.   5. Honor food and beverage preferences within diet restriction of patient in an effort to maximize level of nutrient intake. followup Dr. Underwood

## 2022-11-10 NOTE — PROGRESS NOTE ADULT - ASSESSMENT
( Note written / Date of service 11-10-22 )    ==================>> MEDICATIONS <<====================    acetaminophen     Tablet .. 650 milliGRAM(s) Oral every 6 hours  bisacodyl 5 milliGRAM(s) Oral at bedtime  chlorhexidine 2% Cloths 1 Application(s) Topical <User Schedule>  cholecalciferol 2000 Unit(s) Oral daily  folic acid 1 milliGRAM(s) Oral daily  influenza   Vaccine 0.5 milliLiter(s) IntraMuscular once  lidocaine   4% Patch 1 Patch Transdermal every 24 hours  polyethylene glycol 3350 17 Gram(s) Oral daily  senna 2 Tablet(s) Oral at bedtime    MEDICATIONS  (PRN):  aluminum hydroxide/magnesium hydroxide/simethicone Suspension 30 milliLiter(s) Oral every 4 hours PRN Dyspepsia  bisacodyl Suppository 10 milliGRAM(s) Rectal daily PRN Constipation  melatonin 3 milliGRAM(s) Oral at bedtime PRN Insomnia  morphine   Solution 5 milliGRAM(s) Oral every 3 hours PRN Moderate and Severe pain  morphine   Solution 2.5 milliGRAM(s) Oral every 3 hours PRN BREAKETHROUGH PAIN  ondansetron Injectable 4 milliGRAM(s) IV Push every 8 hours PRN Nausea and/or Vomiting  simethicone 80 milliGRAM(s) Chew four times a day PRN Gas    ___________  Active diet:  ___________________    ==================>> VITAL SIGNS <<==================    Vital Signs Last 24 HrsT(C): 36.9 (11-10-22 @ 14:01)  T(F): 98.5 (11-10-22 @ 14:01), Max: 98.5 (11-10-22 @ 14:01)  HR: 73 (11-10-22 @ 14:01) (64 - 73)  BP: 105/58 (11-10-22 @ 14:19)  RR: 17 (11-10-22 @ 14:01) (16 - 17)  SpO2: 100% (11-10-22 @ 14:01) (100% - 100%)      CAPILLARY BLOOD GLUCOSE         ==================>> LAB AND IMAGING <<==================             WBC count:   9.71 <<== ,  9.29 <<==   Hemoglobin:   9.3 <<==,  9.5 <<==  platelets:  342 <==, 381 <==, 365 <==    Creatinine:  1.11  <<==, 0.82  <<==, 0.86  <<==  Sodium:   133  <==, 136  <==, 135  <==       AST:               ALT:             AP:             Bili:            ____________________________    M I C R O B I O L O G Y :      COVID-19 PCR: Detected (11-05-22 @ 05:25)  COVID-19 PCR: NotDetec (10-30-22 @ 09:38)     _________________________________________________________________________________________  ========>>  M E D I C A L   A T T E N D I N G    F O L L O W  U P  N O T E  <<=========  -----------------------------------------------------------------------------------------------------    - Patient seen and examined by me earlier today.   - In summary,  JEF HOUSE is a 56y year old woman admitted with back / flank pain  - Patient today overall doing the same, intermittent pain .... appears more comfortable today     ==================>> REVIEW OF SYSTEM <<=================    GEN: no fever, no chills, on and off pain as above , taking morphine 5 mg on and off   RESP: no SOB, no cough, no sputum  CVS: ++ chest / upper abd pain, no palpitations  GI: abdominal pain, no nausea, constipation as above   : no dysuria, no frequency, no hematuria in tube or voiding      + chronic vaginal bleeding on and off   Neuro: no headache, no dizziness  Derm : no itching, no rash    ==================>> PHYSICAL EXAM <<=================    GEN: A&O X 3 , NAD , uncomfortable, pleasant, calm , cachectic , pt encouraged to take meds as needed for comfort and bowel regimen   HEENT: NCAT, PERRL, MMM, hearing intact  Neck: supple , no JVD appreciated  CVS: S1S2 , regular , No M/R/G appreciated  PULM: CTA B/L,  no W/R/R appreciated  ABD.: soft. non tender, non distended... encouraged pt to take bowel regimen   Extrem: intact pulses , no edema     nephrostomy in place and draining to bag   PSYCH : normal mood,  not anxious                             ( Note written / Date of service 11-10-22 )    ==================>> MEDICATIONS <<====================    acetaminophen     Tablet .. 650 milliGRAM(s) Oral every 6 hours  bisacodyl 5 milliGRAM(s) Oral at bedtime  chlorhexidine 2% Cloths 1 Application(s) Topical <User Schedule>  cholecalciferol 2000 Unit(s) Oral daily  folic acid 1 milliGRAM(s) Oral daily  influenza   Vaccine 0.5 milliLiter(s) IntraMuscular once  lidocaine   4% Patch 1 Patch Transdermal every 24 hours  polyethylene glycol 3350 17 Gram(s) Oral daily  senna 2 Tablet(s) Oral at bedtime    MEDICATIONS  (PRN):  aluminum hydroxide/magnesium hydroxide/simethicone Suspension 30 milliLiter(s) Oral every 4 hours PRN Dyspepsia  bisacodyl Suppository 10 milliGRAM(s) Rectal daily PRN Constipation  melatonin 3 milliGRAM(s) Oral at bedtime PRN Insomnia  morphine   Solution 5 milliGRAM(s) Oral every 3 hours PRN Moderate and Severe pain  morphine   Solution 2.5 milliGRAM(s) Oral every 3 hours PRN BREAKETHROUGH PAIN  ondansetron Injectable 4 milliGRAM(s) IV Push every 8 hours PRN Nausea and/or Vomiting  simethicone 80 milliGRAM(s) Chew four times a day PRN Gas    ==================>> VITAL SIGNS <<==================    Vital Signs Last 24 HrsT(C): 36.9 (11-10-22 @ 14:01)  T(F): 98.5 (11-10-22 @ 14:01), Max: 98.5 (11-10-22 @ 14:01)  HR: 73 (11-10-22 @ 14:01) (64 - 73)  BP: 105/58 (11-10-22 @ 14:19)  RR: 17 (11-10-22 @ 14:01) (16 - 17)  SpO2: 100% (11-10-22 @ 14:01) (100% - 100%)       ==================>> LAB AND IMAGING <<==================       no labs today   ___________________________________________________________________________________  ===============>>  A S S E S S M E N T   A N D   P L A N <<===============  ------------------------------------------------------------------------------------------    · Assessment	  56 year old female with PMHx of Asthma, Anemia, Bladder cancer with 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     Problem/Plan - 1:  ·  Problem: low back / flank and chest pain.   pain mgmt per palliative team  bowel regimen   increase ambulation  as able   anti gas meds as ordered    Problem/Plan - 2:  ·  Problem: Hemoptysis. on and off, seems resolved now   CT demonstrates: Numerous bilateral pulmonary metastases; mild interval increase in size of a few of the largest pulmonary metastases compared to 9/20/2022.  - Monitor for now    + COVID    pt asymptomatic    likely residual from prior / recent infection    would not treat or isolate     Problem/Plan - 3:  ·  Problem: h/o Hypoglycemia.   ·  Plan: glucose to 59 via FS; non-diabetic, s/p D-50 in the ED  diet encouraged along with supplements  - Monitor fingersticks.    Problem/Plan - 4:  ·  Problem: Primary cancer of bladder with metastasis to other site.   ·  Plan: with metastases and has left hydronephrosis and vaginal bleeding due to mass invasion to cavity   s/p right nephrostomy tube in place draining w/o difficulty  - Oncology consult / follow up ( pt states she will be changing oncologists from [facility], Dr Jones, to [facility])    cancer has extended to the vagina causing some bleeding as above     pt declined TVUS and GYN exam before ( probably wise as likely will cause more bleeding)        GYN appreciated , there is a mass in the vaginal canal, likely cause of bleeding           no additional benefit from a  consult at this time     >> pt is due for nephrostomy tube change >> IR eval  / consult  >> pt now declining      Chronic anemia / anemia of chronic disease     monitor as seems to be declinig..     pt today agreeable to blood transfusion >> transfuse one unite today and monitor      pt post IV Iron       Problem/Plan - 5:  ·  Problem: Slow transit constipation.   ·  Plan: no bowel movement for the past 3 days while on opiate  - Bowel regimen as ordered   - Monitor BM.  - encourage Po   - treat constipation : also offered Linzess  but declined; agreed to mineral oil :  sister to buy and bring in     Problem/Plan - 6:  ·  Problem: Protein calorie malnutrition.   ·  Plan: Poor PO intake in general   - as above     Problem/Plan - 7:  ·  Problem: Prophylactic measure.   ·  Plan: DVT ppx: will do SCD for now, will switch to Lovenox  no PT needs.    discharge planing     pt asking for U/A + Urine culture : at this no indication     --------------------------------------------  Case discussed with pt,   Education given on findings and plan of care  ___________________________  H. NIEVES Ochoa.  Pager: [phone]

## 2022-11-11 NOTE — PROGRESS NOTE ADULT - ASSESSMENT
_________________________________________________________________________________________  ========>>  M E D I C A L   A T T E N D I N G    F O L L O W  U P  N O T E  <<=========  -----------------------------------------------------------------------------------------------------    - Patient seen and examined by me earlier today.   - In summary,  JEF HOUSE is a 56y year old woman admitted with back / flank pain  - Patient today overall doing the same, intermittent pain     ==================>> REVIEW OF SYSTEM <<=================    GEN: no fever, no chills, on and off pain as above , taking morphine 5 mg on and off   RESP: no SOB, no cough, no sputum  CVS: ++ chest / upper abd pain, no palpitations  GI: abdominal pain, no nausea, had a BM today and last night   : no dysuria, no frequency, no hematuria in tube or voiding      + chronic vaginal bleeding on and off   Neuro: no headache, no dizziness  Derm : no itching, no rash    ==================>> PHYSICAL EXAM <<=================    GEN: A&O X 3 , NAD , uncomfortable, pleasant, calm , cachectic , pt encouraged to take meds as needed for comfort and bowel regimen   HEENT: NCAT, PERRL, MMM, hearing intact  Neck: supple , no JVD appreciated  CVS: S1S2 , regular , No M/R/G appreciated  PULM: CTA B/L,  no W/R/R appreciated  ABD.: soft. non tender, non distended...   Extrem: intact pulses , no edema     nephrostomy in place and draining to bag   PSYCH : normal mood,  not anxious                             ( note written / Date of service   11-11-22 )    ==================>> MEDICATIONS <<====================    acetaminophen     Tablet .. 650 milliGRAM(s) Oral every 6 hours  bisacodyl 5 milliGRAM(s) Oral at bedtime  chlorhexidine 2% Cloths 1 Application(s) Topical <User Schedule>  cholecalciferol 2000 Unit(s) Oral daily  folic acid 1 milliGRAM(s) Oral daily  influenza   Vaccine 0.5 milliLiter(s) IntraMuscular once  lidocaine   4% Patch 1 Patch Transdermal every 24 hours  polyethylene glycol 3350 17 Gram(s) Oral daily  senna 2 Tablet(s) Oral at bedtime    MEDICATIONS  (PRN):  aluminum hydroxide/magnesium hydroxide/simethicone Suspension 30 milliLiter(s) Oral every 4 hours PRN Dyspepsia  bisacodyl Suppository 10 milliGRAM(s) Rectal daily PRN Constipation  melatonin 3 milliGRAM(s) Oral at bedtime PRN Insomnia  morphine   Solution 5 milliGRAM(s) Oral every 3 hours PRN Moderate and Severe pain  morphine   Solution 2.5 milliGRAM(s) Oral every 3 hours PRN BREAKETHROUGH PAIN  ondansetron Injectable 4 milliGRAM(s) IV Push every 8 hours PRN Nausea and/or Vomiting  simethicone 80 milliGRAM(s) Chew four times a day PRN Gas    ==================>> VITAL SIGNS <<==================     Vital Signs Last 24 HrsT(C): 36.7 (11-11-22 @ 06:27)  T(F): 98.1 (11-11-22 @ 06:27), Max: 98.5 (11-10-22 @ 14:01)  HR: 66 (11-11-22 @ 06:27) (66 - 73)  BP: 105/48 (11-11-22 @ 06:27)  RR: 16 (11-11-22 @ 06:27) (16 - 17)  SpO2: 100% (11-11-22 @ 06:27) (100% - 100%)       ==================>> LAB AND IMAGING <<==================       no labs today   ___________________________________________________________________________________  ===============>>  A S S E S S M E N T   A N D   P L A N <<===============  ------------------------------------------------------------------------------------------    · Assessment	  56 year old female with PMHx of Asthma, Anemia, Bladder cancer with 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     Problem/Plan - 1:  ·  Problem: low back / flank and chest pain.   pain mgmt per palliative team  bowel regimen   increase ambulation  as able   anti gas meds as ordered    Problem/Plan - 2:  ·  Problem: Hemoptysis. on and off, seems resolved now   CT demonstrates: Numerous bilateral pulmonary metastases; mild interval increase in size of a few of the largest pulmonary metastases compared to 9/20/2022.  - Monitor for now    + COVID    pt asymptomatic    likely residual from prior / recent infection    would not treat or isolate     Problem/Plan - 3:  ·  Problem: h/o Hypoglycemia.   ·  Plan: glucose to 59 via FS; non-diabetic, s/p D-50 in the ED  diet encouraged along with supplements  - Monitor fingersticks.    Problem/Plan - 4:  ·  Problem: Primary cancer of bladder with metastasis to other site.   ·  Plan: with metastases and has left hydronephrosis and vaginal bleeding due to mass invasion to cavity   s/p right nephrostomy tube in place draining w/o difficulty  - Oncology consult / follow up ( pt states she will be changing oncologists from [facility], Dr Jones, to [facility])    cancer has extended to the vagina causing some bleeding as above     pt declined TVUS and GYN exam before ( probably wise as likely will cause more bleeding)        GYN appreciated , there is a mass in the vaginal canal, likely cause of bleeding           no additional benefit from a  consult at this time     >> pt is due for nephrostomy tube change >> IR eval  / consult  >> pt now declining      Chronic anemia / anemia of chronic disease     monitor as seems to be declinig..     pt today agreeable to blood transfusion >> transfuse one unite today and monitor      pt post IV Iron       Problem/Plan - 5:  ·  Problem: Slow transit constipation.   ·  Plan: no bowel movement for the past 3 days while on opiate  - Bowel regimen as ordered   - Monitor BM.  - encourage Po   - treat constipation : also offered Linzess  but declined; agreed to mineral oil :  sister to buy and bring in     Problem/Plan - 6:  ·  Problem: Protein calorie malnutrition.   ·  Plan: Poor PO intake in general   - as above     Problem/Plan - 7:  ·  Problem: Prophylactic measure.   ·  Plan: DVT ppx: will do SCD for now, will switch to Lovenox  no PT needs.    discharge planing     pt asking for U/A + Urine culture : at this no indication     --------------------------------------------  Case discussed with pt, NP  Education given on findings and plan of care  ___________________________  H. NIEVES Ochoa.  Pager: [phone]

## 2022-11-13 NOTE — PROGRESS NOTE ADULT - ASSESSMENT
_________________________________________________________________________________________  ========>>  M E D I C A L   A T T E N D I N G    F O L L O W  U P  N O T E  <<=========  -----------------------------------------------------------------------------------------------------    - Patient seen and examined by me earlier today.   - In summary,  JEF HOUSE is a 56y year old woman admitted with back / flank pain  - Patient today overall doing the same, intermittent pain in back, legs, flank     pt was discharged, appealed, lost, an has fined a secondary appeal as of yesterday     ==================>> REVIEW OF SYSTEM <<=================    GEN: no fever, no chills, on and off pain as above , taking morphine 5 mg on and off        pt has been c/o pain and asking for help but when offered multiple diferent meds / solutions pt has declined to use / try..   RESP: no SOB, no cough, no sputum  CVS: ++ chest / upper abd pain, no palpitations  GI: abdominal pain, no nausea, ( had BM Friday Morning)   : no dysuria, no frequency, no hematuria in tube or voiding      + chronic vaginal bleeding on and off   Neuro: no headache, no dizziness  Derm : no itching, no rash    ==================>> PHYSICAL EXAM <<=================    GEN: A&O X 3 , NAD , uncomfortable, pleasant, calm , cachectic , pt encouraged to take meds as needed for comfort and bowel regimen   HEENT: NCAT, PERRL, MMM, hearing intact  Neck: supple , no JVD appreciated  CVS: S1S2 , regular , No M/R/G appreciated  PULM: CTA B/L,  no W/R/R appreciated  ABD.: soft. non tender, non distended...    Extrem: intact pulses , no edema     nephrostomy in place and draining to bag   PSYCH : normal mood,  not anxious                             ( note written / Date of service   22 )    ==================>> MEDICATIONS <<====================    bisacodyl 5 milliGRAM(s) Oral at bedtime  chlorhexidine 2% Cloths 1 Application(s) Topical <User Schedule>  cholecalciferol 2000 Unit(s) Oral daily  folic acid 1 milliGRAM(s) Oral daily  influenza   Vaccine 0.5 milliLiter(s) IntraMuscular once  lidocaine   4% Patch 1 Patch Transdermal every 24 hours  polyethylene glycol 3350 17 Gram(s) Oral daily  senna 2 Tablet(s) Oral at bedtime    MEDICATIONS  (PRN):  acetaminophen     Tablet .. 325 milliGRAM(s) Oral every 4 hours PRN Mild Pain (1 - 3), Moderate Pain (4 - 6)  aluminum hydroxide/magnesium hydroxide/simethicone Suspension 30 milliLiter(s) Oral every 4 hours PRN Dyspepsia  bisacodyl Suppository 10 milliGRAM(s) Rectal daily PRN Constipation  melatonin 3 milliGRAM(s) Oral at bedtime PRN Insomnia  morphine   Solution 5 milliGRAM(s) Oral every 3 hours PRN Moderate and Severe pain  morphine   Solution 2.5 milliGRAM(s) Oral every 3 hours PRN BREAKETHROUGH PAIN  ondansetron    Tablet 4 milliGRAM(s) Oral every 8 hours PRN Nausea and/or Vomiting  simethicone 80 milliGRAM(s) Chew four times a day PRN Gas    ==================>> VITAL SIGNS <<==================     Vital Signs Last 24 HrsT(C): 36.5 (22 @ 12:49)  T(F): 97.7 (22 @ 12:49), Max: 98.5 (22 @ 05:15)  HR: 66 (22 @ 12:49) (65 - 73)  BP: 108/55 (22 @ 12:49)  RR: 16 (22 @ 12:49) (16 - 17)  SpO2: 100% (22 @ 12:49) (100% - 100%)       ==================>> LAB AND IMAGING <<==================         Urinalysis Basic - ( 2022 11:39 )  Color: Dark Brown / Appearance: Turbid / S.029 / pH: x  Gluc: x / Ketone: Negative  / Bili: Negative / Urobili: <2 mg/dL   Blood: x / Protein: 300 mg/dL / Nitrite: Negative   Leuk Esterase: Large / RBC: >50 /HPF / WBC >50 /HPF   Sq Epi: x / Non Sq Epi: Moderate / Bacteria: Moderate    __________________________________________________________________________________  ===============>>  A S S E S S M E N T   A N D   P L A N <<===============  ------------------------------------------------------------------------------------------    · Assessment	  56 year old female with PMHx of Asthma, Anemia, Bladder cancer with 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     Problem/Plan - 1:  ·  Problem: low back / flank and chest pain.   pain mgmt per palliative team  bowel regimen   increase ambulation  as able   anti gas meds as ordered    Problem/Plan - 2:  ·  Problem: Hemoptysis. on and off, seems resolved now   CT demonstrates: Numerous bilateral pulmonary metastases; mild interval increase in size of a few of the largest pulmonary metastases compared to 2022.  - Monitor for now    + COVID    pt asymptomatic    likely residual from prior / recent infection    no need to treat or isolate     Problem/Plan - 3:  ·  Problem: h/o Hypoglycemia.   ·  Plan: glucose to 59 via FS; non-diabetic, s/p D-50 in the ED  diet and hydration encouraged along with supplements  - Monitor fingersticks.    Problem/Plan - 4:  ·  Problem: Primary cancer of bladder with metastasis to other site.   ·  Plan: with metastases and has left hydronephrosis and vaginal bleeding due to mass invasion to cavity   s/p right nephrostomy tube in place draining w/o difficulty  - Oncology consult / follow up ( pt states she will be changing oncologists from [facility], Dr Jones, to [facility])    cancer has extended to the vagina causing some bleeding as above     pt declined TVUS and GYN exam before ( probably wise as likely will cause more bleeding)        GYN appreciated , there is a mass in the vaginal canal, likely cause of bleeding           no additional benefit from a  consult at this time     >> pt is due for nephrostomy tube change >> IR eval  / consult  >> pt now declining      U/A checked yesterday per pt request per staff...      no change in management     pt likely colonized      no clear signs of infection     pt encouraged to increase hydration as urine dark and pt constipated     Chronic anemia / anemia of chronic disease     monitor as seems to be declinig..     pt today agreeable to blood transfusion >> transfuse one unite today and monitor      pt post IV Iron       Problem/Plan - 5:  ·  Problem: Slow transit constipation.   ·  Plan: no bowel movement for the past 3 days while on opiate  - Bowel regimen as ordered   - Monitor BM.  - encourage Po   - treat constipation : also offered Linzess  but declined; agreed to mineral oil :  sister to buy and bring in     Problem/Plan - 6:  ·  Problem: Protein calorie malnutrition.   ·  Plan: Poor PO intake in general   - as above     Problem/Plan - 7:  ·  Problem: Prophylactic measure.   ·  Plan: DVT ppx: will do SCD for now, will switch to Lovenox  no PT needs.    discharge planing     --------------------------------------------  Case discussed with pt, NP  Education given on findings and plan of care  ___________________________  HLeslee Ochoa D.O.  Pager: [phone]

## 2022-11-14 NOTE — CHART NOTE - NSCHARTNOTEFT_GEN_A_CORE
Discussed case with attending. Pt medially optimized for discharged home with PO abx for uti. Discussed case with attending. Pt medically optimized for discharged home with PO abx for uti.

## 2022-11-14 NOTE — PROGRESS NOTE ADULT - ASSESSMENT
( note written / Date of service   11-14-22 )    ==================>> MEDICATIONS <<====================    amoxicillin  500 milliGRAM(s)/clavulanate 1 Tablet(s) Oral every 12 hours  bisacodyl 5 milliGRAM(s) Oral at bedtime  chlorhexidine 2% Cloths 1 Application(s) Topical <User Schedule>  cholecalciferol 2000 Unit(s) Oral daily  folic acid 1 milliGRAM(s) Oral daily  influenza   Vaccine 0.5 milliLiter(s) IntraMuscular once  lidocaine   4% Patch 1 Patch Transdermal every 24 hours  polyethylene glycol 3350 17 Gram(s) Oral daily  senna 2 Tablet(s) Oral at bedtime    MEDICATIONS  (PRN):  acetaminophen     Tablet .. 325 milliGRAM(s) Oral every 4 hours PRN Mild Pain (1 - 3), Moderate Pain (4 - 6)  aluminum hydroxide/magnesium hydroxide/simethicone Suspension 30 milliLiter(s) Oral every 4 hours PRN Dyspepsia  bisacodyl Suppository 10 milliGRAM(s) Rectal daily PRN Constipation  melatonin 3 milliGRAM(s) Oral at bedtime PRN Insomnia  morphine   Solution 5 milliGRAM(s) Oral every 3 hours PRN Moderate and Severe pain  morphine   Solution 2.5 milliGRAM(s) Oral every 3 hours PRN BREAKETHROUGH PAIN  ondansetron    Tablet 4 milliGRAM(s) Oral every 8 hours PRN Nausea and/or Vomiting  simethicone 80 milliGRAM(s) Chew four times a day PRN Gas    ___________  Active diet:  ___________________    ==================>> VITAL SIGNS <<==================     Vital Signs Last 24 HrsT(C): 36.8 (11-14-22 @ 14:50)  T(F): 98.2 (11-14-22 @ 14:50), Max: 98.4 (11-14-22 @ 05:54)  HR: 78 (11-14-22 @ 14:50) (74 - 83)  BP: 109/56 (11-14-22 @ 14:50)  RR: 16 (11-14-22 @ 14:50) (16 - 17)  SpO2: 99% (11-14-22 @ 14:50) (99% - 100%)      CAPILLARY BLOOD GLUCOSE         ==================>> LAB AND IMAGING <<==================                _________________________________________________________________________________________  ========>>  M E D I C A L   A T T E N D I N G    F O L L O W  U P  N O T E  <<=========  -----------------------------------------------------------------------------------------------------    - Patient seen and examined by me earlier today.   - In summary,  JEF HOUSE is a 56y year old woman admitted with back / flank pain  - Patient today overall doing the same, intermittent pain in back, legs, flank      pt strongly believes her pains are due to UTI, requesting abx > agreed to a course of Augmentin asordered    ==================>> REVIEW OF SYSTEM <<=================    GEN: no fever, no chills, on and off pain as above , taking morphine 5 mg on and off        pt has been c/o pain and asking for help but when offered multiple diferent meds / solutions pt has declined to use / try..   RESP: no SOB, no cough, no sputum  CVS: ++ chest / upper abd pain, no palpitations  GI: abdominal pain, no nausea, ( had BM Friday Morning)   : no dysuria, no frequency, no hematuria in tube or voiding      + chronic vaginal bleeding on and off   Neuro: no headache, no dizziness  Derm : no itching, no rash    ==================>> PHYSICAL EXAM <<=================    GEN: A&O X 3 , NAD , uncomfortable, pleasant, calm , cachectic , pt encouraged to take meds as needed for comfort and bowel regimen   HEENT: NCAT, PERRL, MMM, hearing intact  Neck: supple , no JVD appreciated  CVS: S1S2 , regular , No M/R/G appreciated  PULM: CTA B/L,  no W/R/R appreciated  ABD.: soft. non tender, non distended...    Extrem: intact pulses , no edema     nephrostomy in place and draining to bag : better urine output   PSYCH : normal mood,  not anxious                               ( note written / Date of service   22 )    ==================>> MEDICATIONS <<====================    amoxicillin  500 milliGRAM(s)/clavulanate 1 Tablet(s) Oral every 12 hours  bisacodyl 5 milliGRAM(s) Oral at bedtime  chlorhexidine 2% Cloths 1 Application(s) Topical <User Schedule>  cholecalciferol 2000 Unit(s) Oral daily  folic acid 1 milliGRAM(s) Oral daily  influenza   Vaccine 0.5 milliLiter(s) IntraMuscular once  lidocaine   4% Patch 1 Patch Transdermal every 24 hours  polyethylene glycol 3350 17 Gram(s) Oral daily  senna 2 Tablet(s) Oral at bedtime    MEDICATIONS  (PRN):  acetaminophen     Tablet .. 325 milliGRAM(s) Oral every 4 hours PRN Mild Pain (1 - 3), Moderate Pain (4 - 6)  aluminum hydroxide/magnesium hydroxide/simethicone Suspension 30 milliLiter(s) Oral every 4 hours PRN Dyspepsia  bisacodyl Suppository 10 milliGRAM(s) Rectal daily PRN Constipation  melatonin 3 milliGRAM(s) Oral at bedtime PRN Insomnia  morphine   Solution 5 milliGRAM(s) Oral every 3 hours PRN Moderate and Severe pain  morphine   Solution 2.5 milliGRAM(s) Oral every 3 hours PRN BREAKETHROUGH PAIN  ondansetron    Tablet 4 milliGRAM(s) Oral every 8 hours PRN Nausea and/or Vomiting  simethicone 80 milliGRAM(s) Chew four times a day PRN Gas    ==================>> VITAL SIGNS <<==================     Vital Signs Last 24 HrsT(C): 36.8 (22 @ 14:50)  T(F): 98.2 (22 @ 14:50), Max: 98.4 (22 @ 05:54)  HR: 78 (22 @ 14:50) (74 - 83)  BP: 109/56 (22 @ 14:50)  RR: 16 (22 @ 14:50) (16 - 17)  SpO2: 99% (22 @ 14:50) (99% - 100%)       ==================>> LAB AND IMAGING <<==================       Urinalysis Basic - ( 2022 11:39 )  Color: Dark Brown / Appearance: Turbid / S.029 / pH: x  Gluc: x / Ketone: Negative  / Bili: Negative / Urobili: <2 mg/dL   Blood: x / Protein: 300 mg/dL / Nitrite: Negative   Leuk Esterase: Large / RBC: >50 /HPF / WBC >50 /HPF   Sq Epi: x / Non Sq Epi: Moderate / Bacteria: Moderate    __________________________________________________________________________________  ===============>>  A S S E S S M E N T   A N D   P L A N <<===============  ------------------------------------------------------------------------------------------    · Assessment	  56 year old female with PMHx of Asthma, Anemia, Bladder cancer with 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     Problem/Plan - 1:  ·  Problem: low back / flank and chest pain.   pain mgmt per palliative team  bowel regimen   increase ambulation  as able   anti gas meds as ordered    Problem/Plan - 2:  ·  Problem: Hemoptysis. on and off, seems resolved now   CT demonstrates: Numerous bilateral pulmonary metastases; mild interval increase in size of a few of the largest pulmonary metastases compared to 2022.  - Monitor for now    + COVID    pt asymptomatic    likely residual from prior / recent infection    no need to treat or isolate     Problem/Plan - 3:  ·  Problem: h/o Hypoglycemia.   ·  Plan: glucose to 59 via FS; non-diabetic, s/p D-50 in the ED  diet and hydration encouraged along with supplements  - Monitor fingersticks.    Problem/Plan - 4:  ·  Problem: Primary cancer of bladder with metastasis to other site.   ·  Plan: with metastases and has left hydronephrosis and vaginal bleeding due to mass invasion to cavity   s/p right nephrostomy tube in place draining w/o difficulty  - Oncology consult / follow up ( pt states she will be changing oncologists from [facility], Dr Jones, to [facility])    cancer has extended to the vagina causing some bleeding as above     pt declined TVUS and GYN exam before ( probably wise as likely will cause more bleeding)        GYN appreciated , there is a mass in the vaginal canal, likely cause of bleeding           no additional benefit from a  consult at this time     >> pt is due for nephrostomy tube change >> IR eval  / consult  >> pt now declining      U/A checked yesterday per pt request per staff...      pt likely colonized      pt encouraged to increase hydration as urine dark and pt constipated      abx as above    Chronic anemia / anemia of chronic disease     monitor as seems to be declinig..     pt today agreeable to blood transfusion >> transfuse one unite today and monitor      pt post IV Iron       Problem/Plan - 5:  ·  Problem: Slow transit constipation.   ·  Plan: no bowel movement for the past 3 days while on opiate  - Bowel regimen as ordered   - Monitor BM.  - encourage Po   - treat constipation : also offered Linzess  but declined; agreed to mineral oil :  sister to buy and bring in     Problem/Plan - 6:  ·  Problem: Protein calorie malnutrition.   ·  Plan: Poor PO intake in general   - as above     Problem/Plan - 7:  ·  Problem: Prophylactic measure.   ·  Plan: DVT ppx: will do SCD for now, will switch to Lovenox  no PT needs.    discharge planing     --------------------------------------------  Case discussed with pt, NP  Education given on findings and plan of care  ___________________________  HLeslee Ochoa D.O.  Pager: [phone]

## 2022-11-15 NOTE — PROGRESS NOTE ADULT - REASON FOR ADMISSION
Chronic back pain

## 2022-11-15 NOTE — PROGRESS NOTE ADULT - PROVIDER SPECIALTY LIST ADULT
Internal Medicine
Palliative Care
Heme/Onc
Internal Medicine
Palliative Care
Internal Medicine
Palliative Care
Hospitalist

## 2022-11-15 NOTE — PROGRESS NOTE ADULT - ASSESSMENT
M E D I C A L   A T T E N D I N G    F O L L O W    U P   N O T E  (11-15-22 )                                     ------------------------------------------------------------------------------------------------    patient evaluated by me, case discussed with team, chart, medications, and physical exam reviewed, labs / tests  and vitals reviewed by me, as bellow.   Patient is stable for discharge today.   Patient to follow up with  PMD, Onc... IR to change nephrostomy ..   See discharge document for full note.  [Greater than 35 min spent for these services. ]                             ( note written / Date of service  11-15-22 )    ==================>> MEDICATIONS <<====================    amoxicillin  500 milliGRAM(s)/clavulanate 1 Tablet(s) Oral every 12 hours  bisacodyl 5 milliGRAM(s) Oral at bedtime  chlorhexidine 2% Cloths 1 Application(s) Topical <User Schedule>  cholecalciferol 2000 Unit(s) Oral daily  folic acid 1 milliGRAM(s) Oral daily  influenza   Vaccine 0.5 milliLiter(s) IntraMuscular once  lidocaine   4% Patch 1 Patch Transdermal every 24 hours  polyethylene glycol 3350 17 Gram(s) Oral daily  senna 2 Tablet(s) Oral at bedtime    MEDICATIONS  (PRN):  acetaminophen     Tablet .. 325 milliGRAM(s) Oral every 4 hours PRN Mild Pain (1 - 3), Moderate Pain (4 - 6)  aluminum hydroxide/magnesium hydroxide/simethicone Suspension 30 milliLiter(s) Oral every 4 hours PRN Dyspepsia  bisacodyl Suppository 10 milliGRAM(s) Rectal daily PRN Constipation  melatonin 3 milliGRAM(s) Oral at bedtime PRN Insomnia  morphine   Solution 5 milliGRAM(s) Oral every 3 hours PRN Moderate and Severe pain  morphine   Solution 2.5 milliGRAM(s) Oral every 3 hours PRN BREAKETHROUGH PAIN  ondansetron    Tablet 4 milliGRAM(s) Oral every 8 hours PRN Nausea and/or Vomiting  simethicone 80 milliGRAM(s) Chew four times a day PRN Gas      ==================>> VITAL SIGNS <<==================    T(C): 36.3 (11-15-22 @ 13:02), Max: 36.8 (11-14-22 @ 14:50)  HR: 77 (11-15-22 @ 13:02) (71 - 78)  BP: 113/52 (11-15-22 @ 13:02) (107/57 - 114/64)  BP(mean): --  RR: 16 (11-15-22 @ 13:02) (16 - 16)  SpO2: 100% (11-15-22 @ 13:02) (98% - 100%)       I&O's Summary    14 Nov 2022 07:01  -  15 Nov 2022 07:00  --------------------------------------------------------  IN: 800 mL / OUT: 120 mL / NET: 680 mL    15 Nov 2022 07:01  -  15 Nov 2022 13:51  --------------------------------------------------------  IN: 240 mL / OUT: 100 mL / NET: 140 mL       ==================>> LAB AND IMAGING <<==================                        9.9    11.27 )-----------( 348      ( 15 Nov 2022 08:50 )             32.8           TSH:      1.51   (10-18-22)

## 2022-11-23 PROBLEM — G89.3 PAIN, NEOPLASM-RELATED: Status: ACTIVE | Noted: 2022-01-01

## 2022-11-30 PROBLEM — R10.84 GENERALIZED ABDOMINAL PAIN: Status: ACTIVE | Noted: 2021-09-09

## 2022-12-01 NOTE — ED PROVIDER NOTE - OBJECTIVE STATEMENT
56-year-old female past medical history of bladder cancer with mets, asthma, anemia, hypertension, hyperlipidemia, right-sided nephrostomy tube, pacemaker presents with intermittent shortness of breath times a few weeks that is not related to exertion.  Also with acute on chronic back pain.  Denies fever, cough, abdominal pain, other acute complaints

## 2022-12-01 NOTE — ED PROVIDER NOTE - PHYSICAL EXAMINATION
GENERAL: well appearing, moderate painful distress, crying, thin   HEAD: atraumatic   EYES: EOMI   ENT: moist oral mucosa   CARDIAC: regular rate, no edema   RESPIRATORY: no increased work of breathing, lungs clear  ABDO: soft NTND  MUSCULOSKELETAL: no deformity   NEUROLOGICAL: alert, spontaneous movement of extremities   SKIN: no visible rash  PSYCHIATRIC: cooperative

## 2022-12-01 NOTE — H&P ADULT - PROBLEM SELECTOR PLAN 5
Hx of Bladder Ca with mets  Follows with Dr. Kan and [facility]  last chemotherapy in may of 2022

## 2022-12-01 NOTE — H&P ADULT - NSHPADDITIONALINFOADULT_GEN_ALL_CORE
Patient evaluated, case discussed with me, chart reviewed, agree with above H/P as reviewed.  Dr. LUIS ANGEL Ochoa ([phone])    pt well known to me  pt with SOB in setting of anemia, pulmonary mets, elevated D Dimer  pt does not watt to have CTA as has one functioning kidney and does not want to have V/Q scan either..   in any case pt wll not be able to be anticoagulated due to active bleeding from tumor extension into vaginal canal with chronic daily bleeding     give Iron and PRBC  monitor Hgl    will follow

## 2022-12-01 NOTE — ED PROVIDER NOTE - PROGRESS NOTE DETAILS
EKG - nsr, rate 88, QTc 406, no ischemic changes, no ectopy labs - Hgb 7.8, WBC 14, corrected Ca 9.2, Ddimer 3900  CXR - PPM; metastatic disease  Given elevated D dimer and tachycardia, some concern for PE. I recommended CTA chest. Pt states that another pyshciain told her she cannot get IV contrast due to nephrostomy tube. I explained contrast is ok. She refused CTA. Endorsed to PCP Dr Ochoa and [facility] for possible V/Q scan. I do not have high enough clinical suspicion for PE to empirically anticoagulate, especially given borderline Hgb.

## 2022-12-01 NOTE — H&P ADULT - ASSESSMENT
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

## 2022-12-01 NOTE — H&P ADULT - HISTORY OF PRESENT ILLNESS
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 Patient states that she developed sudden onset dyspnea 2 days ago at rest. The dyspnoea is not alleviated or worsened by any factors. Pt also endorses vaginal bleeding for which she was told is a mass in her vaginal area. Patient denies any chest pain. She was recently admitted at [facility] in October 2022 for hemoptysis and CT chest at that time showed Numerous bilateral pulmonary metastases; mild interval increase in size of a few of the largest pulmonary metastases compared to 9/20/202. Hospital course at that time was complicated by a drop in Hb for which she received blood transfusion. She also tested positive for COVID 19 but was asymptomatic. Pt has a chronic nephrostomy tube that was placed at [facility] for right hydronephrosis. She follows with Dr Kan at [facility]. Pt last recieved chemotherapy in may of 2022. She denies any fevers, chest pain, abd pain.    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 Patient states that she developed sudden onset dyspnea 2 days ago at rest. The dyspnoea is not alleviated or worsened by any factors. Pt also endorses vaginal bleeding for which she was evaluated by OBGYN at [facility] and was recommended further work up including TVUS but patient refused. Patient denies any chest pain. She was recently admitted at [facility] in October 2022 for hemoptysis and CT chest at that time showed Numerous bilateral pulmonary metastases; mild interval increase in size of a few of the largest pulmonary metastases compared to 9/20/202. Hospital course at that time was complicated by a drop in Hb for which she received blood transfusion. She also tested positive for COVID 19 but was asymptomatic. Pt has a chronic nephrostomy tube that was placed at [facility] for right hydronephrosis. She follows with Dr Kan at [place] [facility]. Pt last recieved chemotherapy in may of 2022. She denies any fevers, chest pain, abd pain.    In ED VS: T 98, Hb 7.8, WBC 15, Na 132

## 2022-12-01 NOTE — ED PROVIDER NOTE - CLINICAL SUMMARY MEDICAL DECISION MAKING FREE TEXT BOX
56-year-old female with shortness of breath and tachycardia in the setting of malignancy.  Differential diagnosis concerning for PE versus other.  Plan for EKG, labs, chest x-ray, reassess.  Offered patient pain medication however she states she just took morphine prior to arrival and would like to take one of her own Tylenol's that she has with her

## 2022-12-01 NOTE — H&P ADULT - NSHPREVIEWOFSYSTEMS_GEN_ALL_CORE
She denies any fevers, chest pain, abd pain, hematuria. She denies any fevers, chest pain, abd pain, hematuria.  as above  otherwise negative

## 2022-12-01 NOTE — H&P ADULT - PROBLEM SELECTOR PLAN 1
Chest xray showing metastatic disease unchanged from prior   D-dimer 3902 in ED. Previous D -dimers WNL. Pt not hypoxic or in resp distress. elevated d-dimer may be in setting of cancer but pt is high risk for DVT/PE  Pt refusing CT angio due to concern for kidney toxicity although kidney function is normal  Recommended V/Q scan however patient unsure if she would like to   c/w oxygen PRN  home medication morphine for dyspnea and pain Chest xray showing metastatic disease unchanged from prior   D-dimer 3902 in ED. Previous D -dimers WNL. Pt not hypoxic or in resp distress. elevated d-dimer may be in setting of cancer but pt is high risk for DVT/PE  Pt refusing CT angio due to concern for kidney toxicity although kidney function is normal    Recommended V/Q scan however patient unsure if she would like to   c/w oxygen PRN  home medication morphine for dyspnea and pain

## 2022-12-02 NOTE — ED ADULT NURSE NOTE - ED STAT RN HANDOFF DETAILS
Patient A&Ox4 admitted to medicine for SOB. MD aware Patient refused CTA despite positive Dimer, refused PRBC (witnessed consent for blood transfusion obtained at bedside by MD & placed in chart). Patient refused to begin transfusion or sign out home medications until she arrives to hospital floor. IV intact, no redness or swelling noted. Per Bin in blood bank, patient does not require additional type & screen as she has historical and current lab expiring on 12/4. Blood is ready for . Endorsed to Jenna WILCOX.

## 2022-12-02 NOTE — PROGRESS NOTE ADULT - ASSESSMENT
covering for dr Mann  metastatic bladder ca  nephrostomy tubes  anemia  PPNM  came with sob    pt denied for CTA chest        sob  hypercalcemia  d/w hematolo/onco covering for dr Mann  metastatic bladder ca  nephrostomy tubes  anemia  PPNM  came with sob    pt denied for CTA chest   hgb was low   BT ordered and 2 u PRBC transfused  seen and examined  vs stable  pt is on edge of bed alert awake comfortable  not in any distress  denies CP SOB OR PALP  LUNGS HEART ABD OK   AXO  3   A/P sob SEC ? ANEMIA  bt  POST TRANS CBC   UA POS  HAS NEPHROSTOMY TUBE  id     hypercalcemia  d/w hematolo/onco    BLADDER CA  METASTATIC   POOR PROGNOSIS

## 2022-12-02 NOTE — PATIENT PROFILE ADULT - FALL HARM RISK - HARM RISK INTERVENTIONS

## 2022-12-02 NOTE — PROGRESS NOTE ADULT - SUBJECTIVE AND OBJECTIVE BOX
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 Patient states that she developed sudden onset dyspnea 2 days ago at rest. The dyspnoea is not alleviated or worsened by any factors. Pt also endorses vaginal bleeding for which she was evaluated by OBGYN at [facility] and was recommended further work up including TVUS but patient refused. Patient denies any chest pain. She was recently admitted at [facility] in October 2022 for hemoptysis and CT chest at that time showed Numerous bilateral pulmonary metastases; mild interval increase in size of a few of the largest pulmonary metastases compared to 9/20/202. Hospital course at that time was complicated by a drop in Hb for which she received blood transfusion. She also tested positive for COVID 19 but was asymptomatic. Pt has a chronic nephrostomy tube that was placed at [facility] for right hydronephrosis. She follows with Dr Kan at [facility]. Pt last recieved chemotherapy in may of 2022. She denies any fevers, chest pain, abd pain.    In ED VS: T 98, Hb 7.8, WBC 15, Na 132   (01 Dec 2022 19:07)      Patient is a 56y old  Female who presents with a chief complaint of dyspnea (02 Dec 2022 12:12)      INTERVAL HPI/OVERNIGHT EVENTS:  T(C): 36.8 (12-02-22 @ 13:52), Max: 37.7 (12-02-22 @ 00:00)  HR: 91 (12-02-22 @ 13:52) (82 - 91)  BP: 103/62 (12-02-22 @ 13:52) (100/58 - 116/74)  RR: 17 (12-02-22 @ 13:52) (17 - 20)  SpO2: 100% (12-02-22 @ 13:52) (97% - 100%)  Wt(kg): --  I&O's Summary      REVIEW OF SYSTEMS: denies fever, chills, SOB, palpitations, chest pain, abdominal pain, nausea, vomitting, diarrhea, constipation, dizziness    MEDICATIONS  (STANDING):  ferrous    sulfate 325 milliGRAM(s) Oral daily  folic acid 1 milliGRAM(s) Oral daily  lidocaine   4% Patch 1 Patch Transdermal every 24 hours  pantoprazole    Tablet 40 milliGRAM(s) Oral before breakfast  polyethylene glycol 3350 17 Gram(s) Oral daily  senna 2 Tablet(s) Oral at bedtime    MEDICATIONS  (PRN):  aluminum hydroxide/magnesium hydroxide/simethicone Suspension 30 milliLiter(s) Oral every 4 hours PRN Dyspepsia  bisacodyl Suppository 10 milliGRAM(s) Rectal daily PRN Constipation  diphenhydrAMINE 25 milliGRAM(s) Oral every 4 hours PRN Itching  morphine   Solution 5 milliGRAM(s) Oral every 4 hours PRN Moderate and Severe pain      PHYSICAL EXAM:  GENERAL: NAD, well-groomed, well-developed  HEAD:  Atraumatic, Normocephalic  EYES: EOMI, PERRLA, conjunctiva and sclera clear  ENMT: No tonsillar erythema, exudates, or enlargement; Moist mucous membranes, Good dentition, No lesions  NECK: Supple, No JVD, Normal thyroid  NERVOUS SYSTEM:  Alert & Oriented X3, Good concentration; Motor Strength 5/5 B/L upper and lower extremities; DTRs 2+ intact and symmetric  CHEST/LUNG: Clear to percussion bilaterally; No rales, rhonchi, wheezing, or rubs  HEART: Regular rate and rhythm; No murmurs, rubs, or gallops  ABDOMEN: Soft, Nontender, Nondistended; Bowel sounds present  EXTREMITIES:  2+ Peripheral Pulses, No clubbing, cyanosis, or edema  LYMPH: No lymphadenopathy noted  SKIN: No rashes or lesions  LABS:                        6.9    14.83 )-----------( 349      ( 02 Dec 2022 05:45 )             23.3     12-02    132<L>  |  98  |  29<H>  ----------------------------<  103<H>  4.1   |  26  |  0.94    Ca    11.3<H>      02 Dec 2022 05:45  Phos  3.1     12-02  Mg     1.8     12-02    TPro  7.7  /  Alb  2.2<L>  /  TBili  0.3  /  DBili  x   /  AST  21  /  ALT  21  /  AlkPhos  217<H>  12-02    PT/INR - ( 01 Dec 2022 16:25 )   PT: 15.4 sec;   INR: 1.29 ratio         PTT - ( 01 Dec 2022 16:25 )  PTT:24.9 sec    CAPILLARY BLOOD GLUCOSE

## 2022-12-02 NOTE — PROGRESS NOTE ADULT - PROBLEM SELECTOR PLAN 3
Pt to have nephrostomy tube changed on 12/7 in IR  Continue pain management  Consider pain or palliative care consult if pain regimen not controlling pain  Follows with Dr. Kan and [facility]  Last chemotherapy in may of 2022

## 2022-12-02 NOTE — ED ADULT NURSE NOTE - NSFALLRSKHARMRISK_ED_ALL_ED
To talk to our   - To bypass operators   [phone], press [phone].    Clinic phones are on Monday to Friday between 8AM and 4 PM.    Outside of those hours and during holidays,  Central Scheduling will answer.     Video Appointments -Live well [facility]  Help Desk for Patients  [phone]      Neurology   Insurance Authorization - Referral Specialist - [facility]  Caroline Johnston  [phone]  Ext. [phone]    Closest lab and radiology department  [facility] Information -  [facility] [id number] [phone]  8 am to 4 pm   No appointment needed.     Labs and other Test Results - These may take 1 and up to 7 days depending on the test.   Results will be shared with you over the patient portal as these are resulted.       yes

## 2022-12-02 NOTE — ED ADULT NURSE NOTE - OBJECTIVE STATEMENT
Patient c/o worsening SOB x 1 week with vaginal bleeding. Patient has chronic back pain. Patient currently refusing CTA, MD made aware.

## 2022-12-02 NOTE — ED ADULT NURSE NOTE - ED STAT RN HANDOFF DETAILS 2
Nursing supervisor Nic notified that Patient refused to go to 6th floor with transporter stating "No they harassed me up there I rather go to holding". Supervisor also notified that patient is refusing transfusion until being in holding. Patient notified that there is no available bed at the moment & she states "maybe I will take the blood". IV intact, no redness or swelling noted. VS stable as documented. Endorsed to Archana WILCOX.

## 2022-12-02 NOTE — ED ADULT NURSE NOTE - ED STAT RN HANDOFF DETAILS 3
Pt refused to be transported to assigned unit and room in spite of encouraged. 1 unit of PRBC pending. Pt medicated for pain. Pt remains in stable conditions. Repot endorsed to BRENDEN Mujica.

## 2022-12-02 NOTE — ED ADULT NURSE NOTE - SUICIDE SCREENING DEPRESSION
She is out of her anxiety medication. Last prescription was almost 2 months ago. She takes approximately one tablet daily. Obviously with the recent passing of her  anxiety has been worse. She denies any homicidal or suicidal ideations.   Negative

## 2022-12-02 NOTE — PROGRESS NOTE ADULT - PROBLEM SELECTOR PLAN 1
CXR noted above  D-dimer 3902 (previously WNL)  Pt refusing CT angio due to concern for kidney toxicity  Pt unsure if she wants VQ scan  PRN O2  Continue home regimen morphine for dyspnea and pain

## 2022-12-02 NOTE — PROGRESS NOTE ADULT - SUBJECTIVE AND OBJECTIVE BOX
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 Patient states that she developed sudden onset dyspnea 2 days ago at rest. The dyspnoea is not alleviated or worsened by any factors. Pt also endorses vaginal bleeding for which she was evaluated by OBGYN at [facility] and was recommended further work up including TVUS but patient refused. Patient denies any chest pain. She was recently admitted at [facility] in October 2022 for hemoptysis and CT chest at that time showed Numerous bilateral pulmonary metastases; mild interval increase in size of a few of the largest pulmonary metastases compared to 9/20/202. Hospital course at that time was complicated by a drop in Hb for which she received blood transfusion. She also tested positive for COVID 19 but was asymptomatic. Pt has a chronic nephrostomy tube that was placed at [facility] for right hydronephrosis. She follows with Dr Kan at [facility]. Pt last recieved chemotherapy in may of 2022. She denies any fevers, chest pain, abd pain.    In ED VS: T 98, Hb 7.8, WBC 15, Na 132   (01 Dec 2022 19:07)      OVERNIGHT EVENTS:    No new overnight events.  Seen and examined at bedside.     REVIEW OF SYSTEMS:      CONSTITUTIONAL: No fever  EYES: no acute visual disturbances  NECK: No pain or stiffness  RESPIRATORY: No cough; No shortness of breath  CARDIOVASCULAR: No chest pain, no palpitations  GASTROINTESTINAL: No pain. No nausea, vomiting or diarrhea   NEUROLOGICAL: No headache or numbness, no tremors  MUSCULOSKELETAL: No joint pain, no muscle pain  GENITOURINARY: no dysuria, no frequency, no hesitancy  PSYCHIATRY: no depression, no anxiety  ALL OTHER  ROS negative        Vital Signs Last 24 Hrs  T(C): 36.7 (02 Dec 2022 07:30), Max: 37.7 (02 Dec 2022 00:00)  T(F): 98.1 (02 Dec 2022 07:30), Max: 99.8 (02 Dec 2022 00:00)  HR: 86 (02 Dec 2022 11:25) (82 - 107)  BP: 105/59 (02 Dec 2022 11:25) (100/58 - 116/74)  BP(mean): --  RR: 18 (02 Dec 2022 11:25) (18 - 20)  SpO2: 100% (02 Dec 2022 11:25) (97% - 100%)    Parameters below as of 02 Dec 2022 11:25  Patient On (Oxygen Delivery Method): room air        ________________________________________________  PHYSICAL EXAM:    GENERAL: NAD, frail  HEENT: Normocephalic; conjunctivae and sclerae clear;  NECK : supple, no JVD  CHEST/LUNG: Clear to auscultation; Nonlabored  HEART: S1 S2  regular  ABDOMEN: Soft, Nontender, Nondistended; Bowel sounds present  EXTREMITIES: no cyanosis; no LE edema; no calf tenderness  NERVOUS SYSTEM:  Alert; no new deficits  SKIN: warm and dry; No rashes or lesions    _________________________________________________  CURRENT MEDICATIONS:    MEDICATIONS  (STANDING):  ferrous    sulfate 325 milliGRAM(s) Oral daily  folic acid 1 milliGRAM(s) Oral daily  lidocaine   4% Patch 1 Patch Transdermal every 24 hours  pantoprazole    Tablet 40 milliGRAM(s) Oral before breakfast  polyethylene glycol 3350 17 Gram(s) Oral daily  senna 2 Tablet(s) Oral at bedtime    MEDICATIONS  (PRN):  aluminum hydroxide/magnesium hydroxide/simethicone Suspension 30 milliLiter(s) Oral every 4 hours PRN Dyspepsia  bisacodyl Suppository 10 milliGRAM(s) Rectal daily PRN Constipation  diphenhydrAMINE 25 milliGRAM(s) Oral every 4 hours PRN Itching  morphine   Solution 5 milliGRAM(s) Oral every 4 hours PRN Moderate and Severe pain      __________________________________________________  LABS:                          6.9    14.83 )-----------( 349      ( 02 Dec 2022 05:45 )             23.3     12-02    132<L>  |  98  |  29<H>  ----------------------------<  103<H>  4.1   |  26  |  0.94    Ca    11.3<H>      02 Dec 2022 05:45  Phos  3.1     12-02  Mg     1.8     12-02    TPro  7.7  /  Alb  2.2<L>  /  TBili  0.3  /  DBili  x   /  AST  21  /  ALT  21  /  AlkPhos  217<H>  12-02    PT/INR - ( 01 Dec 2022 16:25 )   PT: 15.4 sec;   INR: 1.29 ratio         PTT - ( 01 Dec 2022 16:25 )  PTT:24.9 sec    CAPILLARY BLOOD GLUCOSE          __________________________________________________  RADIOLOGY & ADDITIONAL TESTS:    Imaging Personally Reviewed:  YES    < from: Xray Chest 1 View- PORTABLE-Urgent (Xray Chest 1 View- PORTABLE-Urgent .) (12.01.22 @ 18:12) >  IMPRESSION: No acute infiltrate. pulmonary metastatic nodules similar to   prior    < end of copied text >    Consultant(s) Notes Reviewed:   YES     Plan of care was discussed with patient and /or primary care giver; all questions and concerns were addressed and care was aligned with patient's wishes.    Plan discussed with attending and consulting physicians.

## 2022-12-03 NOTE — PROGRESS NOTE ADULT - ASSESSMENT
56 year old lady who was adimitted with metastatic bladder ca  nephrostomy tubes  anemia  PPNM  came with sob    pt denied for CTA chest   hgb was low   BT ordered and 2 u PRBC transfused  seen and examined  at bedside. vs stable  pt sitting up in bed with c/o stomach pain that radiates to her lower back and both sides.  Pt also reports constipation with last BM 11/29 however she does not want to have a laxative for fear of bleeding.  States that at home she uses prune juice and prunes.  Nursing staff notified so that these items can be added to her meal trays.      Continue Tylenol prn mild pain  Morphine Soln 5 mg oral every 4 hrs prn moderate and severe pain  Dilaudid 2 mg oral every 6 hrs prn severe pain  Biscodyl suppository 10 mg rectal daily prn constipation (pt may refuse)  Tylenol 1 gm po every 8 hours prn moderate pain  Cyclobenzaprine 5 mg oral 3 x day prn muscle spasms.    Avoid NSAIDS  warm/cool packs PRN  -Repositioning, imagery relaxation, distraction  - PT OOB if no contraindications  Recommendations  discussed with primary team and RN.

## 2022-12-03 NOTE — CONSULT NOTE ADULT - ASSESSMENT
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 Patient states that she developed sudden onset dyspnea 2 days ago at rest. She was recently admitted at [facility] in October 2022 for hemoptysis and CT chest at that time showed Numerous bilateral pulmonary metastases; mild interval increase in size of a few of the largest pulmonary metastases compared to 9/20/202. Hospital course at that time was complicated by a drop in Hb for which she received blood transfusion. She also tested positive for COVID 19 but was asymptomatic. Pt has a chronic nephrostomy tube that was placed at [facility] for right hydronephrosis. She follows with Dr Kan at [facility]. Pt last recieved chemotherapy in may of 2022.  The ID consult requested today, 12/3/22, to assist with evaluation of UTI.    # Recurrent  UTIs  # Metastatic Bladder cancer - s/p ? chemo on May, 2022    would recommend:    1. Please obtain Urine analysis and culture  2. Monitor kidney function   3. Pain management as needed    d/w Covering NP, [place] and patient    will follow the patient with you and make further recommendation based on the clinical course and Lab results  Thank you for the opportunity to participate in Ms. Bell's care      Attending attestation:  Spent more than 65 minutes on total encounter, more than 50 % of the visit was spent counseling and/or coordinating care by the Attending physician.

## 2022-12-03 NOTE — PROGRESS NOTE ADULT - SUBJECTIVE AND OBJECTIVE BOX
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 Patient states that she developed sudden onset dyspnea 2 days ago at rest. The dyspnoea is not alleviated or worsened by any factors. Pt also endorses vaginal bleeding for which she was evaluated by OBGYN at [facility] and was recommended further work up including TVUS but patient refused. Patient denies any chest pain. She was recently admitted at [facility] in October 2022 for hemoptysis and CT chest at that time showed Numerous bilateral pulmonary metastases; mild interval increase in size of a few of the largest pulmonary metastases compared to 9/20/202. Hospital course at that time was complicated by a drop in Hb for which she received blood transfusion. She also tested positive for COVID 19 but was asymptomatic. Pt has a chronic nephrostomy tube that was placed at [facility] for right hydronephrosis. She follows with Dr Kan at [facility]. Pt last recieved chemotherapy in may of 2022. She denies any fevers, chest pain, abd pain.    In ED VS: T 98, Hb 7.8, WBC 15, Na 132   (01 Dec 2022 19:07)      Patient is a 56y old  Female who presents with a chief complaint of dyspnea (03 Dec 2022 16:14)      INTERVAL HPI/OVERNIGHT EVENTS:  T(C): 36.6 (12-03-22 @ 13:06), Max: 36.9 (12-02-22 @ 21:52)  HR: 74 (12-03-22 @ 13:06) (70 - 79)  BP: 109/70 (12-03-22 @ 13:06) (92/54 - 109/70)  RR: 19 (12-03-22 @ 13:06) (17 - 19)  SpO2: 99% (12-03-22 @ 13:06) (99% - 100%)  Wt(kg): --  I&O's Summary    02 Dec 2022 07:01  -  03 Dec 2022 07:00  --------------------------------------------------------  IN: 0 mL / OUT: 100 mL / NET: -100 mL        REVIEW OF SYSTEMS: denies fever, chills, SOB, palpitations, chest pain, abdominal pain, nausea, vomitting, diarrhea, constipation, dizziness    MEDICATIONS  (STANDING):  ferrous    sulfate 325 milliGRAM(s) Oral daily  folic acid 1 milliGRAM(s) Oral daily  lactulose Syrup 15 Gram(s) Oral every other day  lidocaine   4% Patch 1 Patch Transdermal every 24 hours  pantoprazole    Tablet 40 milliGRAM(s) Oral before breakfast  polyethylene glycol 3350 17 Gram(s) Oral daily  senna 2 Tablet(s) Oral at bedtime    MEDICATIONS  (PRN):  acetaminophen     Tablet .. 325 milliGRAM(s) Oral every 4 hours PRN Mild Pain (1 - 3)  aluminum hydroxide/magnesium hydroxide/simethicone Suspension 30 milliLiter(s) Oral every 4 hours PRN Dyspepsia  bisacodyl Suppository 10 milliGRAM(s) Rectal daily PRN Constipation  diphenhydrAMINE 25 milliGRAM(s) Oral every 4 hours PRN Itching  morphine   Solution 5 milliGRAM(s) Oral every 4 hours PRN Moderate and Severe pain      PHYSICAL EXAM:  GENERAL: NAD, well-groomed, well-developed  HEAD:  Atraumatic, Normocephalic  EYES: EOMI, PERRLA, conjunctiva and sclera clear  ENMT: No tonsillar erythema, exudates, or enlargement; Moist mucous membranes, Good dentition, No lesions  NECK: Supple, No JVD, Normal thyroid  NERVOUS SYSTEM:  Alert & Oriented X3, Good concentration; Motor Strength 5/5 B/L upper and lower extremities; DTRs 2+ intact and symmetric  CHEST/LUNG: Clear to percussion bilaterally; No rales, rhonchi, wheezing, or rubs  HEART: Regular rate and rhythm; No murmurs, rubs, or gallops  ABDOMEN: Soft, Nontender, Nondistended; Bowel sounds present  EXTREMITIES:  2+ Peripheral Pulses, No clubbing, cyanosis, or edema  LYMPH: No lymphadenopathy noted  SKIN: No rashes or lesions  LABS:                        10.3   16.33 )-----------( 330      ( 03 Dec 2022 05:41 )             32.7     12-03    135  |  102  |  27<H>  ----------------------------<  101<H>  4.5   |  26  |  0.89    Ca    11.6<H>      03 Dec 2022 05:41  Phos  3.1     12-02  Mg     1.8     12-02    TPro  7.6  /  Alb  2.1<L>  /  TBili  0.5  /  DBili  x   /  AST  21  /  ALT  20  /  AlkPhos  218<H>  12-03        CAPILLARY BLOOD GLUCOSE

## 2022-12-03 NOTE — CHART NOTE - NSCHARTNOTEFT_GEN_A_CORE
Patient is a 56y old  Female who presents with a chief complaint of dyspnea (02 Dec 2022 16:09)  Called by RN that patient requesting evaluation of her pain medication regimen      Vital Signs Last 24 Hrs  T(F): 97.8 (03 Dec 2022 13:06), Max: 98.5 (02 Dec 2022 21:52)  HR: 74 (03 Dec 2022 13:06) (70 - 79)  BP: 109/70 (03 Dec 2022 13:06) (92/54 - 109/70)  RR: 19 (03 Dec 2022 13:06) (17 - 19)  SpO2: 99% (03 Dec 2022 13:06) (99% - 100%)    Parameters below as of 03 Dec 2022 13:06 Patient On (Oxygen Delivery Method): room air    PAST MEDICAL & SURGICAL HISTORY:  Anemia Asthma HLD (hyperlipidemia) Pacemaker [facility] Bladder cancer  HTN (hypertension)  Parathyroid tumor Liver cyst Hydronephrosis has right Nephrostomy tube - dressing intact  Bradycardia History of renal calculi [employer] H/O myomectomy H/O hydronephrosis  s/p Right Perc nephrostomy tube placement 02/2021, exchange 06/2021    LABS:                      10.3   16.33 )-----------( 330      ( 03 Dec 2022 05:41 )             32.7     12-03    135  |  102  |  27<H>  ----------------------------<  101<H>  4.5   |  26  |  0.89    Ca    11.6<H>      03 Dec 2022 05:41  Phos  3.1     12-02  Mg     1.8     12-02  TPro  7.6  /  Alb  2.1<L>  /  TBili  0.5  /  DBili  x   /  AST  21  /  ALT  20  /  AlkPhos  218<H>  12-03  PT/INR - ( 01 Dec 2022 16:25 )   PT: 15.4 sec;   INR: 1.29 ratio    PTT - ( 01 Dec 2022 16:25 )  PTT:24.9 sec      ASSESSMENT AND PLAN   Patient is a 56y old  Female who presents with a chief complaint of dyspnea   daily labs reviewed   patient seen morning eating breakfast but complained of poor appetite   called by nurse that patient requesting review of her pain medications   discussed with covering attending - pain management consult requested  requested also ID consult Dr Rodriguez as urine culture with enterococcus faecalis <50K      f/u ID and pain management recommendations     Care Collaborated Discussed with Consultants/Other Providers [x] YES  [ ] NO

## 2022-12-03 NOTE — PROGRESS NOTE ADULT - SUBJECTIVE AND OBJECTIVE BOX
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 Patient states that she developed sudden onset dyspnea 2 days ago at rest. The dyspnoea is not alleviated or worsened by any factors. Pt also endorses vaginal bleeding for which she was evaluated by OBGYN at [facility] and was recommended further work up including TVUS but patient refused. Patient denies any chest pain. She was recently admitted at [facility] in October 2022 for hemoptysis and CT chest at that time showed Numerous bilateral pulmonary metastases; mild interval increase in size of a few of the largest pulmonary metastases compared to 9/20/202. Hospital course at that time was complicated by a drop in Hb for which she received blood transfusion. She also tested positive for COVID 19 but was asymptomatic. Pt has a chronic nephrostomy tube that was placed at [facility] for right hydronephrosis. She follows with Dr Kan at [facility]. Pt last recieved chemotherapy in may of 2022. She denies any fevers, chest pain, abd pain.    In ED VS: T 98, Hb 7.8, WBC 15, Na 132   (01 Dec 2022 19:07)    Patient is a 56y old  Female who presents with a chief complaint of dyspnea (02 Dec 2022 12:12)  Pain management consulted for c/o pain in her lower back, stomach and sides.  Pt found sitting up in bed hunched over her knees.  On examination, abdomen is taught with + Bs however she reports last BM was 11/29.  She refused offer for suppository stating that she is afraid that it will cause rectal bleeding as this occurred in the past.  Also refused Miralax. She states that pain is sharp, constant and stomach pain radiates to her lower back ((SCALE USED 1-10 VNRS). Lungs are clear to auscultation bilaterally, pt is cachectic.  Reports poor appetite and sleep.    INTERVAL HPI/OVERNIGHT EVENTS:  T(C): 36.8 (12-02-22 @ 13:52), Max: 37.7 (12-02-22 @ 00:00)  HR: 91 (12-02-22 @ 13:52) (82 - 91)  BP: 103/62 (12-02-22 @ 13:52) (100/58 - 116/74)  RR: 17 (12-02-22 @ 13:52) (17 - 20)  SpO2: 100% (12-02-22 @ 13:52) (97% - 100%)    Social History:  Denies any smoking, etoh , or drug use.o (01 Dec 2022 19:07)    MEDICATIONS  (STANDING):  ferrous    sulfate 325 milliGRAM(s) Oral daily  folic acid 1 milliGRAM(s) Oral daily  lidocaine   4% Patch 1 Patch Transdermal every 24 hours  pantoprazole    Tablet 40 milliGRAM(s) Oral before breakfast  polyethylene glycol 3350 17 Gram(s) Oral daily  senna 2 Tablet(s) Oral at bedtime    MEDICATIONS  (PRN):  acetaminophen     Tablet .. 325 milliGRAM(s) Oral every 4 hours PRN Mild Pain (1 - 3)  aluminum hydroxide/magnesium hydroxide/simethicone Suspension 30 milliLiter(s) Oral every 4 hours PRN Dyspepsia  bisacodyl Suppository 10 milliGRAM(s) Rectal daily PRN Constipation  diphenhydrAMINE 25 milliGRAM(s) Oral every 4 hours PRN Itching  morphine   Solution 5 milliGRAM(s) Oral every 4 hours PRN Moderate and Severe pain      Vital Signs Last 24 Hrs  T(C): 36.6 (03 Dec 2022 13:06), Max: 36.9 (02 Dec 2022 21:52)  T(F): 97.8 (03 Dec 2022 13:06), Max: 98.5 (02 Dec 2022 21:52)  HR: 74 (03 Dec 2022 13:06) (70 - 79)  BP: 109/70 (03 Dec 2022 13:06) (92/54 - 109/70)  BP(mean): --  RR: 19 (03 Dec 2022 13:06) (17 - 19)  SpO2: 99% (03 Dec 2022 13:06) (99% - 100%)    Parameters below as of 03 Dec 2022 13:06  Patient On (Oxygen Delivery Method): room air                          10.3   16.33 )-----------( 330      ( 03 Dec 2022 05:41 )             32.7   12-03    135  |  102  |  27<H>  ----------------------------<  101<H>  4.5   |  26  |  0.89    Ca    11.6<H>      03 Dec 2022 05:41  Phos  3.1     12-02  Mg     1.8     12-02    TPro  7.6  /  Alb  2.1<L>  /  TBili  0.5  /  DBili  x   /  AST  21  /  ALT  20  /  AlkPhos  218<H>  12-03    LIVER FUNCTIONS - ( 03 Dec 2022 05:41 )    Alb: 2.1 g/dL / Pro: 7.6 g/dL / ALK PHOS: 218 U/L / ALT: 20 U/L DA / AST: 21 U/L / GGT: x             CAPILLARY BLOOD GLUCOSE        REVIEW OF SYSTEMS      General:	A & O x 3, speech clear, no acute distress    Skin/Breast: Warm, dry, intact  	  Ophthalmologic: No c/o vision disturbances  	  ENMT:	Normocephalic, no oral or throat pain reported    Respiratory and Thorax: No dyspnea reported, no tachypnea  	  Cardiovascular:	No c/o chest pain, chest pressure    Gastrointestinal:	c/o stomach pain that radiates to back and both sides.  Pain is 10/10.  Last reported BM 11/29.      Genitourinary:	Nephrostomy tube, bladder ca    Musculoskeletal:	 able to JEONG's independently, no joint swelling noted    Neurological:	A& O x 3, pt sobbing quietly at times    Psychiatric:	pt sobbing quietly at times    Hematology/Lymphatics:	Low Hbg and had 2 units PRBCs transfused    Endocrine:	    Allergic/Immunologic:	  PHYSICAL EXAM:  GENERAL: NAD, cachectic, well-developed  HEAD:  Atraumatic, Normocephalic  EYES: EOMI, PERRLA, conjunctiva and sclera clear  ENMT: No tonsillar erythema, exudates, or enlargement; Moist mucous membranes, Good dentition, No lesions  NECK: Supple, No JVD, Normal thyroid  NERVOUS SYSTEM:  Alert & Oriented X3, Good concentration; Motor Strength 5/5 B/L upper and lower extremities; CHEST/LUNG: Clear to percussion bilaterally; No rales, rhonchi, wheezing, or rubs  HEART: Regular rate and rhythm; No murmurs, rubs, or gallops  ABDOMEN: Taut, Nontender, Nondistended, Bowel sounds present in all quadrants  EXTREMITIES:  2+ Peripheral Pulses, No clubbing, cyanosis, or edema  LYMPH: No lymphadenopathy noted  SKIN: No rashes or lesions        CONSTITUTIONAL: no apparent distress, A & O x 4  EYES:  EOMI, No conjunctival or scleral injection, non-icteric  ENMT: Oral mucosa with moist membranes. Normal dentition; no pharyngeal injection or exudates             NECK: Supple, symmetric and without tracheal deviation   RESP: No respiratory distress, no use of accessory muscles; CTA b/l, no WRR  CV: RRR, +S1S2, no MRG; no JVD; no peripheral edema  GI: Taut, NT, ND,   LYMPH: No cervical LAD or tenderness; no axillary LAD or tenderness; no inguinal LAD or tenderness  MSK: No digital clubbing or cyanosis; examination of the (head/neck/spine/ribs/pelvis, RUE, LUE, RLE, LLE) without misalignment,            Normal ROM without pain, no spinal tenderness, normal muscle strength/tone  SKIN: No rashes or ulcers noted; no subcutaneous nodules or induration palpable  NEURO: CN II-XII intact; normal reflexes in upper and lower extremities, sensation intact in upper and lower extremities b/l to light touch   PSYCH: Appropriate insight/judgment; A+O x 3, mood and affect appropriate, recent/remote memory intact

## 2022-12-03 NOTE — PROGRESS NOTE ADULT - ASSESSMENT
seen and examiend vsstable afebrile physical done ok  denies cp or sob or palp  pt is c/o constipation  denies nausea vomiting  no abd pain   labs day  a/p s/p BT  hgb 10.3  constipation on miralex and senna   add lactulose TIW  if no relief fleet enema seen and examiend vsstable afebrile physical done ok  denies cp or sob or palp  pt is c/o constipation  denies nausea vomiting  no abd pain   labs day  a/p s/p BT  hgb 10.3  constipation on miralex and senna   add lactulose TIW  if no relief fleet enema  HYPERCALCEMIA  AGAIN HEM/ONC CALLED

## 2022-12-03 NOTE — CHART NOTE - NSCHARTNOTEFT_GEN_A_CORE
EVENT:   12/3/22, 3:30am, patient with advanced metastatic bladder ca, c/o severe pain level 10/10 all over the body. Requested pain medication. BP - 99/63, HR - 76.   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 Patient states that she developed sudden onset dyspnea 2 days ago at rest. The dyspnoea is not alleviated or worsened by any factors. Pt also endorses vaginal bleeding for which she was evaluated by OBGYN at [facility] and was recommended further work up including TVUS but patient refused. Patient denies any chest pain. She was recently admitted at [facility] in October 2022 for hemoptysis and CT chest at that time showed Numerous bilateral pulmonary metastases; mild interval increase in size of a few of the largest pulmonary metastases compared to 9/20/202. Hospital course at that time was complicated by a drop in Hb for which she received blood transfusion. She also tested positive for COVID19 19 but was asymptomatic. Pt has a chronic nephrostomy tube that was placed at [facility] for right hydronephrosis. She follows with Dr Kan at [facility]. Pt last received chemotherapy in may of 2022. She denies any fevers, chest pain, abd pain.    OBJECTIVE:  Vital Signs Last 24 Hrs  T(C): 36.9 (03 Dec 2022 05:12), Max: 36.9 (02 Dec 2022 21:52)  T(F): 98.5 (03 Dec 2022 05:12), Max: 98.5 (02 Dec 2022 21:52)  HR: 70 (03 Dec 2022 05:12) (70 - 91)  BP: 103/62 (03 Dec 2022 05:12) (92/54 - 116/74)  BP(mean): --  RR: 17 (03 Dec 2022 05:12) (17 - 18)  SpO2: 100% (03 Dec 2022 05:12) (100% - 100%)    Parameters below as of 03 Dec 2022 05:12  Patient On (Oxygen Delivery Method): room air        FOCUSED PHYSICAL EXAM:    LABS:                        6.9    14.83 )-----------( 349      ( 02 Dec 2022 05:45 )             23.3     12-02    132<L>  |  98  |  29<H>  ----------------------------<  103<H>  4.1   |  26  |  0.94    Ca    11.3<H>      02 Dec 2022 05:45  Phos  3.1     12-02  Mg     1.8     12-02    TPro  7.7  /  Alb  2.2<L>  /  TBili  0.3  /  DBili  x   /  AST  21  /  ALT  21  /  AlkPhos  217<H>  12-02    PLAN:   - Morphine solution 1 mg po x 1 dose,   - Consult for acute pain for advanced metastatic cancer.   FOLLOW UP / RESULT:   - Reassess patient pain level,   - Maintain safety and comfort level.

## 2022-12-04 NOTE — PROGRESS NOTE ADULT - ASSESSMENT
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 Patient states that she developed sudden onset dyspnea 2 days ago at rest. She was recently admitted at [facility] in October 2022 for hemoptysis and CT chest at that time showed Numerous bilateral pulmonary metastases; mild interval increase in size of a few of the largest pulmonary metastases compared to 9/20/202. Hospital course at that time was complicated by a drop in Hb for which she received blood transfusion. She also tested positive for COVID 19 but was asymptomatic. [facility] She follows with Dr Kan at [facility]. Pt last received chemotherapy in may of 2022.  The ID consult requested today, 12/3/22, to assist with evaluation of UTI.    # Recurrent  UTIs-   # Leukocytosis   # Metastatic Bladder cancer - s/p ? chemo on May, 2022    would recommend:    1. Please start Unasyn based on previous Urine culture and sensitivities from 11/2022  2. Monitor WBC count   3. Monitor kidney function   4. Pain management as needed    d/w Covering NP, [place] and patient    Attending Attestation:    Spent more than 45 minutes on total encounter, more than 50 % of the visit was spent counseling and/or coordinating care by the Attending physician.

## 2022-12-04 NOTE — PROGRESS NOTE ADULT - ASSESSMENT
_________________________________________________________________________________________  ========>>  M E D I C A L   A T T E N D I N G    F O L L O W  U P  N O T E  <<=========  -----------------------------------------------------------------------------------------------------    - Patient seen and examined by me approximately sixty minutes ago.  - In summary,  JEF HOUSE is a 56y year old woman admitted with SOB  - Patient today overall doing ok, comfortable, eating fairly states sometimes has a hard time swallowing .. !     ==================>> REVIEW OF SYSTEM <<=================    GEN: no fever, no chills, ++ pain in  back and flank, chronic   RESP: feels SOB, no cough, no sputum ( pt's  own Pulse Ox is on showing 99% O2 sat on room air )   CVS: no chest pain, no palpitations, no edema  GI: no abdominal pain, no nausea, chronic constipation  : no dysuria, no frequency, no hematuria, + chronic vaginal bleeding due to tumor extension)   Neuro: no headache, no dizziness  Derm : no itching, no rash    ==================>> PHYSICAL EXAM <<=================    GEN: A&O X 3 , NAD , fairly comfortable, calm , somewhat anxious at times   HEENT: NCAT, PERRL, MMM, hearing intact  Neck: supple , no JVD appreciated  CVS: S1S2 , regular , No M/R/G appreciated  PULM: CTA B/L,  no W/R/R appreciated  ABD.: soft. non tender, non distended,  bowel sounds present  Extrem: intact pulses , no edema       nephrostomy tube in place and draining   PSYCH : normal mood,  anxious                            ( Note Written / Date of service :  22 )    ==================>> MEDICATIONS <<====================    MEDICATIONS  (STANDING):  fentaNYL   Patch  25 MICROgram(s)/Hr 1 Patch Transdermal every 72 hours  ferrous    sulfate 325 milliGRAM(s) Oral daily  folic acid 1 milliGRAM(s) Oral daily  lactulose Syrup 15 Gram(s) Oral every other day  lidocaine   4% Patch 1 Patch Transdermal every 24 hours  morphine Concentrate 3 milliGRAM(s) Oral every 4 hours  pamidronate IVPB 60 milliGRAM(s) IV Intermittent once  pantoprazole    Tablet 40 milliGRAM(s) Oral before breakfast  polyethylene glycol 3350 17 Gram(s) Oral daily  senna 2 Tablet(s) Oral at bedtime  sodium chloride 0.9% Bolus 1000 milliLiter(s) IV Bolus once    MEDICATIONS  (PRN):  acetaminophen     Tablet .. 325 milliGRAM(s) Oral every 4 hours PRN Mild Pain (1 - 3)  aluminum hydroxide/magnesium hydroxide/simethicone Suspension 30 milliLiter(s) Oral every 4 hours PRN Dyspepsia  bisacodyl Suppository 10 milliGRAM(s) Rectal daily PRN Constipation  diphenhydrAMINE 25 milliGRAM(s) Oral every 4 hours PRN Itching  morphine Concentrate 2 milliGRAM(s) Oral every 3 hours PRN breakethrough pain    ___________  Active diet:  Diet, NPO:   NPO for Procedure/Test     NPO Start Date: 06-Dec-2022,   NPO Start Time: 23:59  Diet, Regular:   DASH/TLC Sodium & Cholesterol Restricted  ___________________    ==================>> VITAL SIGNS <<==================    T(C): 36.5 (22 @ 05:18), Max: 36.7 (22 @ 21:17)  HR: 87 (22 @ 05:18) (74 - 87)  BP: 107/70 (22 @ 05:18) (107/70 - 115/69)  RR: 18 (22 @ 05:18) (18 - 19)  SpO2: 100% (22 @ 05:18) (99% - 100%)     I&O's Summary    03 Dec 2022 07:01  -  04 Dec 2022 07:00  --------------------------------------------------------  IN: 0 mL / OUT: 300 mL / NET: -300 mL     ==================>> LAB AND IMAGING <<==================                        10.3   16.33 )-----------( 330      ( 03 Dec 2022 05:41 )             32.7        Hemoglobin:   10.3 <<==,  6.9 <<==,  7.8 <<==  WBC count:   16.33 <<== ,  14.83 <<== ,  14.51 <<==     12-03    135  |  102  |  27<H>  ----------------------------<  101<H>  4.5   |  26  |  0.89    Ca    11.6<H>      03 Dec 2022 05:41    TPro  7.6  /  Alb  2.1<L>  /  TBili  0.5  /  DBili  x   /  AST  21  /  ALT  20  /  AlkPhos  218<H>  12-03    Urinalysis Basic - ( 04 Dec 2022 04:43 )  Color: Red / Appearance: very cloudy / S.010 / pH: x  Gluc: x / Ketone: Small  / Bili: Negative / Urobili: Negative   Blood: x / Protein: 500 mg/dL / Nitrite: Positive   Leuk Esterase: Moderate / RBC: >50 /HPF / WBC >50 /HPF   Sq Epi: x / Non Sq Epi: Few /HPF / Bacteria: Many /HPF    TSH:      1.51   (10-18-22)           ___________________________________________________________________________________  ===============>>  A S S E S S M E N T   A N D   P L A N <<===============  ------------------------------------------------------------------------------------------    · Assessment	  56 year old female with PMHx of Asthma, Anemia, Bladder cancer with metastases to the lungs, Bradycardia, Hypertension, Dyslipidemia, Hydronephrosis, R-nephrostomy tube, Liver cyst, Parathyroid tumor, Chronic pain and 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     Problem/Plan - 1:  ·  Problem: dyspnea.   ·  Plan: Chest xray showing metastatic disease unchanged from prior   high risk for DVT/PE but with contraindication for AC due to active chronic vaginal bleeding due to cancer   pt on room air, saturating well not tachycardic , clinically comfortable   home medication morphine for dyspnea and pain.    Problem/Plan - 2:  ·  Problem: Anemia.   post 2 Units PRBC with good results   f/u CBC post transfusion  f/u iron studies.  hem following    Problem/Plan - 3:  ·  Problem: Vaginal bleeding.   ·  Plan: hx of vaginal bleeding, refused obgyn assessment at [facility]  Hx of bladder cancer likely causing bleeding   pt seen by GYN at [facility]: no intervention possible at this time..   pt will need treatment for cancer     Problem/Plan - 4:  ·  Problem: Hyponatremia. improved  Sodium:   135  <==, 132  <==, 132  <==  encourage PO intake     Problem/Plan - 5:  ·  Problem: Bladder cancer.   ·  Plan: Hx of Bladder Ca with mets  last chemotherapy in may of 2022.  hem onc following    ** hypercalcemia     appreciated Hem onc eval    pt getting treatment    monitor    Problem/Plan - 6:  ·  Problem: Prophylactic measure.   ·  Plan: SCD  PPI.    --------------------------------------------  Case discussed with pt  Education given on findings and plan of care  ___________________________  HLeslee Ochoa D.O.  Pager: [phone]

## 2022-12-04 NOTE — CHART NOTE - NSCHARTNOTEFT_GEN_A_CORE
EVENT: Pt self medicating    BRIEF HPI:        OBJECTIVE:  Vital Signs Last 24 Hrs  T(C): 36.6 (04 Dec 2022 13:15), Max: 36.7 (03 Dec 2022 21:17)  T(F): 97.8 (04 Dec 2022 13:15), Max: 98 (03 Dec 2022 21:17)  HR: 90 (04 Dec 2022 13:15) (74 - 90)  BP: 126/74 (04 Dec 2022 13:15) (107/70 - 126/74)  BP(mean): --  RR: 18 (04 Dec 2022 13:15) (18 - 18)  SpO2: 100% (04 Dec 2022 13:15) (100% - 100%)    Parameters below as of 04 Dec 2022 13:15  Patient On (Oxygen Delivery Method): room air        FOCUSED PHYSICAL EXAM:    LABS:                        10.3   16.33 )-----------( 330      ( 03 Dec 2022 05:41 )             32.7     12-03    135  |  102  |  27<H>  ----------------------------<  101<H>  4.5   |  26  |  0.89    Ca    11.6<H>      03 Dec 2022 05:41    TPro  7.6  /  Alb  2.1<L>  /  TBili  0.5  /  DBili  x   /  AST  21  /  ALT  20  /  AlkPhos  218<H>  12-03      EKG:   IMGAGING:    ASSESSMENT:  HPI:  56 year old female with PMHx of Asthma, Anemia, Bladder cancer with metastases to the lungs, Bradycardia, Hypertension, Dyslipidemia, Hydronephrosis, R-nephrostomy tube, Liver cyst, Parathyroid tumor, Chronic pain and 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 Patient states that she developed sudden onset dyspnea 2 days ago at rest. The dyspnoea is not alleviated or worsened by any factors. Pt also endorses vaginal bleeding for which she was evaluated by OBGYN at [facility] and was recommended further work up including TVUS but patient refused. Patient denies any chest pain. She was recently admitted at [facility] in October 2022 for hemoptysis and CT chest at that time showed Numerous bilateral pulmonary metastases; mild interval increase in size of a few of the largest pulmonary metastases compared to 9/20/202. Hospital course at that time was complicated by a drop in Hb for which she received blood transfusion. She also tested positive for COVID 19 but was asymptomatic. Pt has a chronic nephrostomy tube that was placed at [facility] for right hydronephrosis. She follows with Dr Kan at [facility]. Pt last recieved chemotherapy in may of 2022. She denies any fevers, chest pain, abd pain.    In ED VS: T 98, Hb 7.8, WBC 15, Na 132   (01 Dec 2022 19:07)      PLAN:   MEDICATIONS  (STANDING):  ferrous    sulfate 325 milliGRAM(s) Oral daily  folic acid 1 milliGRAM(s) Oral daily  lactulose Syrup 15 Gram(s) Oral every other day  lidocaine   4% Patch 1 Patch Transdermal every 24 hours  pantoprazole    Tablet 40 milliGRAM(s) Oral before breakfast  polyethylene glycol 3350 17 Gram(s) Oral daily  senna 2 Tablet(s) Oral at bedtime    MEDICATIONS  (PRN):  acetaminophen     Tablet .. 325 milliGRAM(s) Oral every 4 hours PRN Mild Pain (1 - 3)  aluminum hydroxide/magnesium hydroxide/simethicone Suspension 30 milliLiter(s) Oral every 4 hours PRN Dyspepsia  bisacodyl Suppository 10 milliGRAM(s) Rectal daily PRN Constipation  diphenhydrAMINE 25 milliGRAM(s) Oral every 4 hours PRN Itching    FOLLOW UP / RESULT: EVENT: Per pharmacist liquid Tylenol not available. pt with medication at bedside self medicating. 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     OBJECTIVE:  Vital Signs Last 24 Hrs  T(C): 36.6 (04 Dec 2022 13:15), Max: 36.7 (03 Dec 2022 21:17)  T(F): 97.8 (04 Dec 2022 13:15), Max: 98 (03 Dec 2022 21:17)  HR: 90 (04 Dec 2022 13:15) (74 - 90)  BP: 126/74 (04 Dec 2022 13:15) (107/70 - 126/74)  BP(mean): --  RR: 18 (04 Dec 2022 13:15) (18 - 18)  SpO2: 100% (04 Dec 2022 13:15) (100% - 100%)    Parameters below as of 04 Dec 2022 13:15  Patient On (Oxygen Delivery Method): room air        FOCUSED PHYSICAL EXAM:    LABS:                        10.3   16.33 )-----------( 330      ( 03 Dec 2022 05:41 )             32.7     12-03    135  |  102  |  27<H>  ----------------------------<  101<H>  4.5   |  26  |  0.89    Ca    11.6<H>      03 Dec 2022 05:41    TPro  7.6  /  Alb  2.1<L>  /  TBili  0.5  /  DBili  x   /  AST  21  /  ALT  20  /  AlkPhos  218<H>  12-03    Home Medications:  aluminum hydroxide-magnesium hydroxide 200 mg-200 mg/5 mL oral suspension: 30 milliliter(s) orally every 4 hours, As needed, Dyspepsia (01 Dec 2022 19:17)  Tylenol:  (01 Dec 2022 19:17)    EKG:   IMGAGING:    ASSESSMENT:  HPI:  56 year old female with PMHx of Asthma, Anemia, Bladder cancer with metastases to the lungs, Bradycardia, Hypertension, Dyslipidemia, Hydronephrosis, R-nephrostomy tube, Liver cyst, Parathyroid tumor, Chronic pain and 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 Patient states that she developed sudden onset dyspnea 2 days ago at rest. The dyspnoea is not alleviated or worsened by any factors. Pt also endorses vaginal bleeding for which she was evaluated by OBGYN at [facility] and was recommended further work up including TVUS but patient refused. Patient denies any chest pain. She was recently admitted at [facility] in October 2022 for hemoptysis and CT chest at that time showed Numerous bilateral pulmonary metastases; mild interval increase in size of a few of the largest pulmonary metastases compared to 9/20/202. Hospital course at that time was complicated by a drop in Hb for which she received blood transfusion. She also tested positive for COVID 19 but was asymptomatic. Pt has a chronic nephrostomy tube that was placed at [facility] for right hydronephrosis. She follows with Dr Kan at [facility]. Pt last recieved chemotherapy in may of 2022. She denies any fevers, chest pain, abd pain.    In ED VS: T 98, Hb 7.8, WBC 15, Na 132   (01 Dec 2022 19:07)      PLAN:   MEDICATIONS  (STANDING):  ferrous    sulfate 325 milliGRAM(s) Oral daily  folic acid 1 milliGRAM(s) Oral daily  lactulose Syrup 15 Gram(s) Oral every other day  lidocaine   4% Patch 1 Patch Transdermal every 24 hours  pantoprazole    Tablet 40 milliGRAM(s) Oral before breakfast  polyethylene glycol 3350 17 Gram(s) Oral daily  senna 2 Tablet(s) Oral at bedtime    MEDICATIONS  (PRN):  acetaminophen     Tablet .. 325 milliGRAM(s) Oral every 4 hours PRN Mild Pain (1 - 3)  aluminum hydroxide/magnesium hydroxide/simethicone Suspension 30 milliLiter(s) Oral every 4 hours PRN Dyspepsia  bisacodyl Suppository 10 milliGRAM(s) Rectal daily PRN Constipation  diphenhydrAMINE 25 milliGRAM(s) Oral every 4 hours PRN Itching    FOLLOW UP / RESULT: EVENT: Per pharmacist liquid morphine not available. Pt with liquid morphine at bedside self medicating. States oral morphine is the only pain med that works. Refusing all other pain meds.    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     OBJECTIVE:  Vital Signs Last 24 Hrs  T(C): 36.6 (04 Dec 2022 13:15), Max: 36.7 (03 Dec 2022 21:17)  T(F): 97.8 (04 Dec 2022 13:15), Max: 98 (03 Dec 2022 21:17)  HR: 90 (04 Dec 2022 13:15) (74 - 90)  BP: 126/74 (04 Dec 2022 13:15) (107/70 - 126/74)  BP(mean): --  RR: 18 (04 Dec 2022 13:15) (18 - 18)  SpO2: 100% (04 Dec 2022 13:15) (100% - 100%)    Parameters below as of 04 Dec 2022 13:15  Patient On (Oxygen Delivery Method): room air    FOCUSED PHYSICAL EXAM:  GEN: Thin, cachectic female, OOB to bathroom  RESP: Dyspnea on exertion  CV: S1 S2  NEURO: Alert, oriented X 4    LABS:                        10.3   16.33 )-----------( 330      ( 03 Dec 2022 05:41 )             32.7     12-03    135  |  102  |  27<H>  ----------------------------<  101<H>  4.5   |  26  |  0.89    Ca    11.6<H>      03 Dec 2022 05:41    TPro  7.6  /  Alb  2.1<L>  /  TBili  0.5  /  DBili  x   /  AST  21  /  ALT  20  /  AlkPhos  218<H>  12-03    IMAGING:  ACC: [id number] EXAM:  CT CHEST                        ACC: [id number] EXAM:  CT ABDOMEN AND PELVIS          IMPRESSION:  1. Large bladder mass is without significant change. Severe chronic   hydronephrosis of the left kidney is unchanged. Right nephrostomy tube in   place; no right hydronephrosis.  2.  Numerous bilateral pulmonary metastases; mild interval increase in   size of a few of the largest pulmonary metastases compared to 9/20/2022.  3.  Progression of liver metastases.  4.  No evidence of osseous metastatic disease.    PROBLEM: Inadequate pain management pt refusing available pain med  PLAN:   1. Per pharmacy pt liquid morphine not available and pt med cannot be repackaged  2. Pt [facility]  liquid morphine med to be kept in med cabinet and dispense every four hours as needed for pain to be documented in care note, nsg supervisor Breanne evans  3. Cont lidocaine   4% Patch 1 Patch Transdermal every 24 hours  4. Cont acetaminophen Tablet 325 diego GRAM(s) Oral every 4 hours PRN Mild Pain (1 - 3)    FOLLOW UP / RESULT: effectiveness of med EVENT: Per pharmacist liquid morphine not available. Pt with liquid morphine at bedside self medicating. States oral morphine is the only pain med that works. Refusing all other pain meds.    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     OBJECTIVE:  Vital Signs Last 24 Hrs  T(C): 36.6 (04 Dec 2022 13:15), Max: 36.7 (03 Dec 2022 21:17)  T(F): 97.8 (04 Dec 2022 13:15), Max: 98 (03 Dec 2022 21:17)  HR: 90 (04 Dec 2022 13:15) (74 - 90)  BP: 126/74 (04 Dec 2022 13:15) (107/70 - 126/74)  BP(mean): --  RR: 18 (04 Dec 2022 13:15) (18 - 18)  SpO2: 100% (04 Dec 2022 13:15) (100% - 100%)    Parameters below as of 04 Dec 2022 13:15  Patient On (Oxygen Delivery Method): room air    FOCUSED PHYSICAL EXAM:  GEN: Thin, cachectic female, OOB to bathroom  RESP: Dyspnea on exertion  CV: S1 S2  NEURO: Alert, oriented X 4    LABS:                        10.3   16.33 )-----------( 330      ( 03 Dec 2022 05:41 )             32.7     12-03    135  |  102  |  27<H>  ----------------------------<  101<H>  4.5   |  26  |  0.89    Ca    11.6<H>      03 Dec 2022 05:41    TPro  7.6  /  Alb  2.1<L>  /  TBili  0.5  /  DBili  x   /  AST  21  /  ALT  20  /  AlkPhos  218<H>  12-03    IMAGING:  ACC: [id number] EXAM:  CT CHEST                        ACC: [id number] EXAM:  CT ABDOMEN AND PELVIS          IMPRESSION:  1. Large bladder mass is without significant change. Severe chronic   hydronephrosis of the left kidney is unchanged. Right nephrostomy tube in   place; no right hydronephrosis.  2.  Numerous bilateral pulmonary metastases; mild interval increase in   size of a few of the largest pulmonary metastases compared to 9/20/2022.  3.  Progression of liver metastases.  4.  No evidence of osseous metastatic disease.    PROBLEM: Inadequate pain management pt refusing available pain med  PLAN:   1. Per pharmacy pt liquid morphine not available and pt med cannot be repackaged  2. Pt [facility]  liquid morphine med to be kept in med cabinet and dispense every four hours as needed for pain to be documented in care note, nsg supervisor Breanne evans  3. Cont lidocaine   4% Patch 1 Patch Transdermal every 24 hours  4. Cont acetaminophen Tablet 325 diego GRAM(s) Oral every 4 hours PRN Mild Pain (1 - 3)    FOLLOW UP / RESULT: effectiveness of pain med

## 2022-12-04 NOTE — PROGRESS NOTE ADULT - SUBJECTIVE AND OBJECTIVE BOX
Patient is seen and examined at the bed side, is afebrile. The UA is positive as of 12/3/22, culture is in process.       REVIEW OF SYSTEMS: All other review systems are negative      ALLERGIES: No Known Allergies      Vital Signs Last 24 Hrs  T(C): 36.4 (04 Dec 2022 20:07), Max: 36.7 (03 Dec 2022 21:17)  T(F): 97.5 (04 Dec 2022 20:07), Max: 98 (03 Dec 2022 21:17)  HR: 73 (04 Dec 2022 20:07) (73 - 90)  BP: 123/67 (04 Dec 2022 20:07) (107/70 - 126/74)  BP(mean): --  RR: 18 (04 Dec 2022 20:07) (18 - 18)  SpO2: 98% (04 Dec 2022 20:07) (98% - 100%)    Parameters below as of 04 Dec 2022 20:07  Patient On (Oxygen Delivery Method): room air        PHYSICAL EXAM:  GENERAL: Not in distress   CHEST/LUNG:  Not using accessory muscles   HEART: s1 and s2 present  ABDOMEN:  Nontender and  Nondistended  EXTREMITIES: No pedal  edema  CNS: Awake and Alert      LABS: No new Labs                        10.3   16.33 )-----------( 330      ( 03 Dec 2022 05:41 )             32.7                12-03    135  |  102  |  27<H>  ----------------------------<  101<H>  4.5   |  26  |  0.89    Ca    11.6<H>      03 Dec 2022 05:41  Phos  3.1     12-02  Mg     1.8     12-02    TPro  7.6  /  Alb  2.1<L>  /  TBili  0.5  /  DBili  x   /  AST  21  /  ALT  20  /  AlkPhos  218<H>  12-03    PT/INR - ( 01 Dec 2022 16:25 )   PT: 15.4 sec;   INR: 1.29 ratio         PTT - ( 01 Dec 2022 16:25 )  PTT:24.9 sec        MEDICATIONS  (STANDING):    ferrous    sulfate 325 milliGRAM(s) Oral daily  folic acid 1 milliGRAM(s) Oral daily  lactulose Syrup 15 Gram(s) Oral every other day  lidocaine   4% Patch 1 Patch Transdermal every 24 hours  pantoprazole    Tablet 40 milliGRAM(s) Oral before breakfast  polyethylene glycol 3350 17 Gram(s) Oral daily  senna 2 Tablet(s) Oral at bedtime      RADIOLOGY & ADDITIONAL TESTS:    12/1/22 : Xray Chest 1 View- PORTABLE-Urgent (Xray Chest 1 View- PORTABLE-Urgent .) (12.01.22 @ 18:12) >    Left ICD. Normal heart mediastinum. Hyperinflated lungs. Multiple parenchymal nodules of varying sizes reidentified similar to prior   consistent with metastatic disease. No acute infiltrate pleural effusion  or pneumothorax.    IMPRESSION: No acute infiltrate. pulmonary metastatic nodules similar to  prior      MICROBIOLOGY DATA:    Urine Microscopic-Add On (NC) (12.04.22 @ 04:43)   Red Blood Cell - Urine: >50 /HPF   White Blood Cell - Urine: >50 /HPF   Bacteria: Many /HPF   Epithelial Cells: Few /HPF       COVID-19 PCR (12.01.22 @ 16:25)   COVID-19 PCR: NotDetec:

## 2022-12-04 NOTE — PROGRESS NOTE ADULT - SUBJECTIVE AND OBJECTIVE BOX
57 year old lady with bladder ca mets to lungs, last chemo 5/22. presented with sob, pain.  she has right nephrostomy tube, parathyroid tumor, and vaginal bleeding.    1. hypercalcemia with elevated PTH  but can not r/o cancer causing worsening of hypercalcemia  IVF, can give a dose of pamidronate or calcitonin    2. pain.  will give fentanyl patch with morphine for breakthrough    3 SOB, worsening lung mets  ?PE.  she refused CTA.  will need V/Q    4. high d-dimer  from cancer, infection or DVT/PE  do doppler  not on AC due to vaginal bleeding    5. leukocytosis  ? infection vs cancer.  new onset  favor infection  NT with UA finding, UTI  needs to be on antibiotics    6. anemia  she has vaginal bleeding  transfuse as needed  will check iron profile, hemolysis    7. bladder ca  last chemo 5/22  palliative consult

## 2022-12-05 NOTE — PROGRESS NOTE ADULT - PROBLEM SELECTOR PLAN 8
-pt will not be able to be anticoagulated due to active bleeding from tumor extension into vaginal canal with chronic daily bleeding  -DVT prophylaxis: SCD's

## 2022-12-05 NOTE — GOALS OF CARE CONVERSATION - ADVANCED CARE PLANNING - CONVERSATION DETAILS
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 GOC discussion initiated by the provider, pt states her sister Giovanna Bell  on the phone.  Patient alert, oriented X 4, now on PRN O2 via NC 2 l/min. Pt states she wants to be full gisela with everything done but God is in charge. Regarding artificial nutrition, patient would like long-term feeding tube if needed. She would like all other medically necessary interventions as needed. MOLST  completed and placed in chart, questions answered. 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 GOC discussion initiated by the provider, pt states her sister Giovanna Bell is  on the phone.  Patient alert, oriented X 4, now on PRN O2 via NC 2 l/min. Pt states she wants to be full code with everything done but God is in charge. Regarding artificial nutrition, patient would like long-term feeding tube if needed. She would like all other medically necessary interventions as needed. MOLST  completed and placed in chart, questions answered.

## 2022-12-05 NOTE — DIETITIAN INITIAL EVALUATION ADULT - ADD RECOMMEND
Sever PCM identified. Liberalized diet recommended d/c DASH/TLC 2/2 underweight status, poor PO intake and bladder cancer and b/l pulmonary metastasis. Refuses supplements (either too sweet or it gives gastritis). RD remains available. MD to monitor.

## 2022-12-05 NOTE — DIETITIAN INITIAL EVALUATION ADULT - PERTINENT LABORATORY DATA
12-05    138  |  103  |  29<H>  ----------------------------<  103<H>  4.0   |  27  |  0.96    Ca    11.4<H>      05 Dec 2022 05:20    A1C with Estimated Average Glucose Result: 5.6 % (06-29-22 @ 13:49)  A1C with Estimated Average Glucose Result: 5.7 % (06-28-22 @ 12:08)  A1C with Estimated Average Glucose Result: 5.5 % (01-16-22 @ 18:39)

## 2022-12-05 NOTE — PROGRESS NOTE ADULT - ASSESSMENT
( Note written / Date of service 12-05-22 )    ==================>> MEDICATIONS <<====================    ampicillin/sulbactam  IVPB      ampicillin/sulbactam  IVPB 3 Gram(s) IV Intermittent every 6 hours  ferrous    sulfate 325 milliGRAM(s) Oral daily  folic acid 1 milliGRAM(s) Oral daily  lactulose Syrup 15 Gram(s) Oral every other day  lidocaine   4% Patch 1 Patch Transdermal every 24 hours  pantoprazole    Tablet 40 milliGRAM(s) Oral before breakfast  polyethylene glycol 3350 17 Gram(s) Oral daily  senna 2 Tablet(s) Oral at bedtime    MEDICATIONS  (PRN):  acetaminophen     Tablet .. 325 milliGRAM(s) Oral every 4 hours PRN Moderate Pain (4 - 6)  aluminum hydroxide/magnesium hydroxide/simethicone Suspension 30 milliLiter(s) Oral every 4 hours PRN Dyspepsia  bisacodyl Suppository 10 milliGRAM(s) Rectal daily PRN Constipation  diphenhydrAMINE 25 milliGRAM(s) Oral every 4 hours PRN Itching  morphine   Solution 5 milliGRAM(s) Oral every 3 hours PRN Severe Pain (7 - 10)  simethicone 80 milliGRAM(s) Chew every 6 hours PRN Gas    ___________  Active diet:  Diet, NPO:   NPO for Procedure/Test     NPO Start Date: 06-Dec-2022,   NPO Start Time: 23:59  Diet, Regular:   DASH/TLC Sodium & Cholesterol Restricted  ___________________    ==================>> VITAL SIGNS <<==================    Vital Signs Last 24 HrsT(C): 36.3 (12-05-22 @ 13:41)  T(F): 97.4 (12-05-22 @ 13:41), Max: 97.5 (12-04-22 @ 20:07)  HR: 76 (12-05-22 @ 13:41) (71 - 76)  BP: 108/69 (12-05-22 @ 13:41)  RR: 18 (12-05-22 @ 13:41) (16 - 18)  SpO2: 100% (12-05-22 @ 13:41) (98% - 100%)      CAPILLARY BLOOD GLUCOSE         ==================>> LAB AND IMAGING <<==================                        10.5   12.73 )-----------( 379      ( 05 Dec 2022 05:20 )             34.3        12-05    138  |  103  |  29<H>  ----------------------------<  103<H>  4.0   |  27  |  0.96    Ca    11.4<H>      05 Dec 2022 05:20      WBC count:   12.73 <<== ,  16.33 <<== ,  14.83 <<== ,  14.51 <<==   Hemoglobin:   10.5 <<==,  10.3 <<==,  6.9 <<==,  7.8 <<==  platelets:  379 <==, 330 <==, 349 <==, 368 <==    Creatinine:  0.96  <<==, 0.89  <<==, 0.94  <<==, 1.18  <<==  Sodium:   138  <==, 135  <==, 132  <==, 132  <==       AST:          21 <== , 21 <== , 39 <==      ALT:        20  <== , 21  <== , 22  <==      AP:        218  <=, 217  <=, 205  <=     Bili:        0.5  <=, 0.3  <=, 0.3  <=    ____________________________    M I C R O B I O L O G Y :    Culture - Urine (collected 04 Dec 2022 04:43)  Source: Clean Catch Clean Catch (Midstream)  Final Report (05 Dec 2022 10:24):    <10,000 CFU/mL Normal Urogenital Vandana        COVID-19 PCR: NotDetec (12-01-22 @ 16:25)     _________________________________________________________________________________________  ========>>  M E D I C A L   A T T E N D I N G    F O L L O W  U P  N O T E  <<=========  -----------------------------------------------------------------------------------------------------    - Patient seen and examined by me earlier today.   - In summary,  JEF HOUSE is a 56y year old woman admitted with SOB  - Patient today overall doing ok, comfortable, eating fairly , pain controlled with morphine, trying to have BM     ==================>> REVIEW OF SYSTEM <<=================    GEN: no fever, no chills, ++ pain in  back and flank, chronic   RESP: feels SOB, no cough, no sputum ( pt's  own Pulse Ox is on showing 99% O2 sat on room air )   CVS: no chest pain, no palpitations, no edema  GI: no abdominal pain, no nausea, chronic constipation  : no dysuria, no frequency, no hematuria, + chronic vaginal bleeding due to tumor extension)   Neuro: no headache, no dizziness  Derm : no itching, no rash    ==================>> PHYSICAL EXAM <<=================    GEN: A&O X 3 , NAD , fairly comfortable, calm , somewhat anxious at times   HEENT: NCAT, PERRL, MMM, hearing intact  Neck: supple , no JVD appreciated  CVS: S1S2 , regular , No M/R/G appreciated  PULM: CTA B/L,  no W/R/R appreciated  ABD.: soft. non tender, non distended,  bowel sounds present  Extrem: intact pulses , no edema       nephrostomy tube in place and draining   PSYCH : normal mood,  anxious                              ( Note written / Date of service 22 )    ==================>> MEDICATIONS <<====================    ampicillin/sulbactam  IVPB      ampicillin/sulbactam  IVPB 3 Gram(s) IV Intermittent every 6 hours  ferrous    sulfate 325 milliGRAM(s) Oral daily  folic acid 1 milliGRAM(s) Oral daily  lactulose Syrup 15 Gram(s) Oral every other day  lidocaine   4% Patch 1 Patch Transdermal every 24 hours  pantoprazole    Tablet 40 milliGRAM(s) Oral before breakfast  polyethylene glycol 3350 17 Gram(s) Oral daily  senna 2 Tablet(s) Oral at bedtime    MEDICATIONS  (PRN):  acetaminophen     Tablet .. 325 milliGRAM(s) Oral every 4 hours PRN Moderate Pain (4 - 6)  aluminum hydroxide/magnesium hydroxide/simethicone Suspension 30 milliLiter(s) Oral every 4 hours PRN Dyspepsia  bisacodyl Suppository 10 milliGRAM(s) Rectal daily PRN Constipation  diphenhydrAMINE 25 milliGRAM(s) Oral every 4 hours PRN Itching  morphine   Solution 5 milliGRAM(s) Oral every 3 hours PRN Severe Pain (7 - 10)  simethicone 80 milliGRAM(s) Chew every 6 hours PRN Gas    ___________  Active diet:  Diet, NPO:   NPO for Procedure/Test     NPO Start Date: 06-Dec-2022,   NPO Start Time: 23:59  Diet, Regular:   DASH/TLC Sodium & Cholesterol Restricted  ___________________    ==================>> VITAL SIGNS <<==================    Vital Signs Last 24 HrsT(C): 36.3 (22 @ 13:41)  T(F): 97.4 (22 @ 13:41), Max: 97.5 (22 @ 20:07)  HR: 76 (22 @ 13:41) (71 - 76)  BP: 108/69 (22 @ 13:41)  RR: 18 (22 @ 13:41) (16 - 18)  SpO2: 100% (22 @ 13:41) (98% - 100%)       ==================>> LAB AND IMAGING <<==================                        10.5   12.73 )-----------( 379      ( 05 Dec 2022 05:20 )             34.3            138  |  103  |  29<H>  ----------------------------<  103<H>  4.0   |  27  |  0.96    Ca    11.4<H>      05 Dec 2022 05:20      WBC count:   12.73 <<== ,  16.33 <<== ,  14.83 <<== ,  14.51 <<==   Hemoglobin:   10.5 <<==,  10.3 <<==,  6.9 <<==,  7.8 <<==  platelets:  379 <==, 330 <==, 349 <==, 368 <==    Creatinine:  0.96  <<==, 0.89  <<==, 0.94  <<==, 1.18  <<==  Sodium:   138  <==, 135  <==, 132  <==, 132  <==       AST:          21 <== , 21 <== , 39 <==      ALT:        20  <== , 21  <== , 22  <==      AP:        218  <=, 217  <=, 205  <=     Bili:        0.5  <=, 0.3  <=, 0.3  <=    Urinalysis Basic - ( 04 Dec 2022 04:43 )  Color: Red / Appearance: very cloudy / S.010 / pH: x  Gluc: x / Ketone: Small  / Bili: Negative / Urobili: Negative   Blood: x / Protein: 500 mg/dL / Nitrite: Positive   Leuk Esterase: Moderate / RBC: >50 /HPF / WBC >50 /HPF   Sq Epi: x / Non Sq Epi: Few /HPF / Bacteria: Many /HPF    ____________________________    M I C R O B I O L O G Y :    Culture - Urine (collected 04 Dec 2022 04:43)  Source: Clean Catch Clean Catch (Midstream)  Final Report (05 Dec 2022 10:24):    <10,000 CFU/mL Normal Urogenital Vandana    ___________________________________________________________________________________  ===============>>  A S S E S S M E N T   A N D   P L A N <<===============  ------------------------------------------------------------------------------------------    · Assessment	  56 year old female with PMHx of Asthma, Anemia, Bladder cancer with metastases to the lungs, Bradycardia, Hypertension, Dyslipidemia, Hydronephrosis, R-nephrostomy tube, Liver cyst, Parathyroid tumor, Chronic pain and 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     Problem/Plan - 1:  ·  Problem: dyspnea.   ·  Plan: Chest xray showing metastatic disease unchanged from prior : most likely cause of dyspnea ( although anemia contributing)   high risk for DVT/PE but with contraindication for AC due to active chronic vaginal bleeding due to cancer   pt on room air, saturating well not tachycardic , clinically comfortable   home medication morphine for dyspnea and pain.    Problem/Plan - 2:  ·  Problem: Anemia.   post 2 Units PRBC with good results   f/u CBC post transfusion  hem following  IV Iron as per Hem discretion        22 @ 05:45  Iron:     16 ug/dL  Iron %:   9 %  TIBC:     180 ug/dL    Problem/Plan - 3:  ·  Problem: Vaginal bleeding.   ·  Plan: hx of vaginal bleeding, refused obgyn assessment at [facility]  Hx of bladder cancer likely causing bleeding   pt seen by GYN at [facility] no intervention possible at this time..   pt will need treatment for cancer     Problem/Plan - 4:  ·  Problem: Hyponatremia. improved   Sodium:   138  <==, 135  <==, 132  <==, 132  <==  encourage PO intake     Problem/Plan - 5:  ·  Problem: Bladder cancer.   ·  Plan: Hx of Bladder Ca with mets  last chemotherapy in may of 2022.  hem onc following    **nephrostomy tube overdue for exchange>>> arranged for Wednesday Morning with IR with anesthesia     ** pt being treated by ID for recurrent UTIS !     ** hypercalcemia     appreciated Hem onc eval    pt declined treatment    monitor    Problem/Plan - 6:  ·  Problem: Prophylactic measure.   ·  Plan: SCD  PPI.    --------------------------------------------  Case discussed with pt  Education given on findings and plan of care  ___________________________  HLeslee Ochoa D.O.  Pager: [phone]

## 2022-12-05 NOTE — DIETITIAN INITIAL EVALUATION ADULT - OTHER INFO
56 years old female admitted from home, with multiple hospitalization, know to the writer from prior hospitalizations; visited during lunch today. Lunch tray untouched, pt stated it looks dry and she has problem at times swallowing the food, she needs more gravy with meals to moist the food (meal of the day baked chicken with rice and mix veggies), pt was asked if she would like something else she said that she would take a tuna sandwich. Does not want any tomatoes 2/2 gastritis, no milk 2/2 she was told not to take it due to cancer, no sugar 2/2 someone told her not to take it because of cancer. Pt was advise to increase intake of high dense food and take what she could eat and not to restrict herself from any foods items that she can tolerate eating to prevent further weight loss/maintain current wt and with desirable weight gain if possible.  Pt decline any supplementation and will try to eat as much as she can. RD remains available.

## 2022-12-05 NOTE — PROGRESS NOTE ADULT - ASSESSMENT
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 Patient states that she developed sudden onset dyspnea.  Pt also endorses vaginal bleeding for which she was evaluated by OBGYN at [facility] and was recommended further work up including TVUS but patient refused.   Pt has a chronic nephrostomy tube that was placed at [facility] for right hydronephrosis.  Pt last recieved chemotherapy in may of 2022. pt admitted for dyspnea. pt found to have UTI, abx started, ID Dr. Rodriguez following. supplemental oxygen tapered off. pt is pending nephrostomy tube change IR 12/7.

## 2022-12-05 NOTE — CONSULT NOTE ADULT - ASSESSMENT
Confidential Drug Utilization Report  Search Terms: Zoë Bell, 1965Search Date: 12/05/2022 09:13:40 AM  The Drug Utilization Report below displays all of the controlled substance prescriptions, if any, that your patient has filled in the last twelve months. The information displayed on this report is compiled from pharmacy submissions to the Department, and accurately reflects the information as submitted by the pharmacies.    This report was requested by: Pat Mora | Reference #: [id number]    You have not added a ABI number. Keeping your ABI number(s) up to date on the My ABI # page will enable the separation of your prescriptions from others in the search results.    Others' Prescriptions  Patient Name: Zoë BellBirth Date: 1965  Address: [address] [place], [place] [address]Hsa: Female  Rx Written	Rx Dispensed	Drug	Quantity	Days Supply	Prescriber Name	Prescriber Abi #	Payment Method	Dispenser  11/26/2022	11/26/2022	morphine sulf 10 mg/5 ml soln	430ml	20	Duke Meng M	[id number]	[id number]  11/15/2022	11/21/2022	morphine sulf 10 mg/5 ml soln	75ml	5	Ash Garcia	[id number]	Medicare	[id number]    Patient Name: Zoë BellBirth Date: 1965  Address: [address] [place], [place] [address]Fpk: Female  Rx Written	Rx Dispensed	Drug	Quantity	Days Supply	Prescriber Name	Prescriber Abi #	Payment Method	Dispenser  11/15/2022	11/15/2022	morphine sulf 10 mg/5 ml soln	75ml	5	Ash Garcia	[id number]	[facility]    Patient Name: Zoë BellBirth Date: 1965  Address: [address] [facility] [address] [place], [place] [address]Rpe: Female  Rx Written	Rx Dispensed	Drug	Quantity	Days Supply	Prescriber Name	Prescriber Abi #	Payment Method	Dispenser  10/04/2022	10/15/2022	tramadol hcl 50 mg tablet	10	5	Jai Hughes [id number]	Medicare	[facility] [id number]  08/26/2022	09/01/2022	tramadol hcl 50 mg tablet	20	5	Timi Patel	[id number]	Medicare	[facility] [id number]  08/26/2022	09/01/2022	diazepam 5 mg tablet	15	5	Timi Patel	[id number]	Medicare	[facility] [id number]  * - Drugs marked with an asterisk are compound drugs. If the compound drug is made up of more than one controlled substance, then each controlled substance will be a separate row in the table.

## 2022-12-05 NOTE — PROGRESS NOTE ADULT - SUBJECTIVE AND OBJECTIVE BOX
Patient is seen and examined at the bed side, is afebrile. She is taking Unasyn and tolerating it okay. The Leukocytosis is trending down.      REVIEW OF SYSTEMS: All other review systems are negative      ALLERGIES: No Known Allergies      Vital Signs Last 24 Hrs  T(C): 36.3 (05 Dec 2022 13:41), Max: 36.3 (05 Dec 2022 04:48)  T(F): 97.4 (05 Dec 2022 13:41), Max: 97.4 (05 Dec 2022 04:48)  HR: 76 (05 Dec 2022 13:41) (71 - 76)  BP: 108/69 (05 Dec 2022 13:41) (108/69 - 115/71)  BP(mean): --  RR: 18 (05 Dec 2022 13:41) (16 - 18)  SpO2: 100% (05 Dec 2022 13:41) (100% - 100%)    Parameters below as of 05 Dec 2022 13:41  Patient On (Oxygen Delivery Method): room air        PHYSICAL EXAM:  GENERAL: Not in distress , on oxygen via NC  CHEST/LUNG:  Not using accessory muscles   HEART: s1 and s2 present  ABDOMEN:  Nontender and  Nondistended  EXTREMITIES: No pedal  edema  CNS: Awake and Alert        LABS:                        10.5   12.73 )-----------( 379      ( 05 Dec 2022 05:20 )             34.3                           10.3   16.33 )-----------( 330      ( 03 Dec 2022 05:41 )             32.7                12-05    138  |  103  |  29<H>  ----------------------------<  103<H>  4.0   |  27  |  0.96    Ca    11.4<H>      05 Dec 2022 05:20      12-03    135  |  102  |  27<H>  ----------------------------<  101<H>  4.5   |  26  |  0.89    Ca    11.6<H>      03 Dec 2022 05:41  Phos  3.1     12-02  Mg     1.8     12-02    TPro  7.6  /  Alb  2.1<L>  /  TBili  0.5  /  DBili  x   /  AST  21  /  ALT  20  /  AlkPhos  218<H>  12-03    PT/INR - ( 01 Dec 2022 16:25 )   PT: 15.4 sec;   INR: 1.29 ratio         PTT - ( 01 Dec 2022 16:25 )  PTT:24.9 sec        MEDICATIONS  (STANDING):    ampicillin/sulbactam  IVPB      ampicillin/sulbactam  IVPB 3 Gram(s) IV Intermittent every 6 hours  ferrous    sulfate 325 milliGRAM(s) Oral daily  folic acid 1 milliGRAM(s) Oral daily  lactulose Syrup 15 Gram(s) Oral every other day  lidocaine   4% Patch 1 Patch Transdermal every 24 hours  pantoprazole    Tablet 40 milliGRAM(s) Oral before breakfast  polyethylene glycol 3350 17 Gram(s) Oral daily  senna 2 Tablet(s) Oral at bedtime        RADIOLOGY & ADDITIONAL TESTS:    12/1/22 : Xray Chest 1 View- PORTABLE-Urgent (Xray Chest 1 View- PORTABLE-Urgent .) (12.01.22 @ 18:12) >    Left ICD. Normal heart mediastinum. Hyperinflated lungs. Multiple parenchymal nodules of varying sizes reidentified similar to prior   consistent with metastatic disease. No acute infiltrate pleural effusion  or pneumothorax.    IMPRESSION: No acute infiltrate. pulmonary metastatic nodules similar to  prior        MICROBIOLOGY DATA:    Urine Microscopic-Add On (NC) (12.04.22 @ 04:43)   Red Blood Cell - Urine: >50 /HPF   White Blood Cell - Urine: >50 /HPF   Bacteria: Many /HPF   Epithelial Cells: Few /HPF       COVID-19 PCR (12.01.22 @ 16:25)   COVID-19 PCR: NotDetec:

## 2022-12-05 NOTE — DIETITIAN INITIAL EVALUATION ADULT - PERTINENT MEDS FT
MEDICATIONS  (STANDING):  ampicillin/sulbactam  IVPB      ampicillin/sulbactam  IVPB 3 Gram(s) IV Intermittent every 6 hours  ferrous    sulfate 325 milliGRAM(s) Oral daily  folic acid 1 milliGRAM(s) Oral daily  lactulose Syrup 15 Gram(s) Oral every other day  lidocaine   4% Patch 1 Patch Transdermal every 24 hours  pantoprazole    Tablet 40 milliGRAM(s) Oral before breakfast  polyethylene glycol 3350 17 Gram(s) Oral daily  senna 2 Tablet(s) Oral at bedtime    MEDICATIONS  (PRN):  acetaminophen     Tablet .. 325 milliGRAM(s) Oral every 4 hours PRN Mild Pain (1 - 3)  aluminum hydroxide/magnesium hydroxide/simethicone Suspension 30 milliLiter(s) Oral every 4 hours PRN Dyspepsia  bisacodyl Suppository 10 milliGRAM(s) Rectal daily PRN Constipation  diphenhydrAMINE 25 milliGRAM(s) Oral every 4 hours PRN Itching  morphine   Solution 5 milliGRAM(s) Oral every 4 hours PRN Moderate Pain (4 - 6)

## 2022-12-05 NOTE — PROGRESS NOTE ADULT - SUBJECTIVE AND OBJECTIVE BOX
Patient is a 56y old  Female who presents with a chief complaint of dyspnea (05 Dec 2022 15:54)      OVERNIGHT EVENTS: none noted, pt in bed eating breakfast     REVIEW OF SYSTEMS:  RESPIRATORY: No cough, SOB  CARDIOVASCULAR: No chest pain, palpitations  GASTROINTESTINAL: No abdominal pain. No nausea, vomiting, or diarrhea  GENITOURINARY: No dysuria  NEUROLOGICAL: No HA    T(C): 36.3 (22 @ 13:41), Max: 36.4 (22 @ 20:07)  HR: 76 (22 @ 13:41) (71 - 76)  BP: 108/69 (22 @ 13:41) (108/69 - 123/67)  RR: 18 (22 @ 13:41) (16 - 18)  SpO2: 100% (22 @ 13:41) (98% - 100%)  Wt(kg): --Vital Signs Last 24 Hrs  T(C): 36.3 (05 Dec 2022 13:41), Max: 36.4 (04 Dec 2022 20:07)  T(F): 97.4 (05 Dec 2022 13:41), Max: 97.5 (04 Dec 2022 20:07)  HR: 76 (05 Dec 2022 13:41) (71 - 76)  BP: 108/69 (05 Dec 2022 13:41) (108/69 - 123/67)  BP(mean): --  RR: 18 (05 Dec 2022 13:41) (16 - 18)  SpO2: 100% (05 Dec 2022 13:41) (98% - 100%)    Parameters below as of 05 Dec 2022 13:41  Patient On (Oxygen Delivery Method): room air    PHYSICAL EXAM:  GENERAL: well groomed, NAD  CHEST/LUNG: non labored breathing  HEART: S1, S2, Regular rate and rhythm  ABDOMEN: Soft, Nontender, Nondistended; Bowel sounds present  NEURO: Alert & Oriented X3  EXTREMITIES: No LE edema, no calf tenderness    Consultant(s) Notes Reviewed:  [x ] YES  [ ] NO  Care Discussed with Consultants/Other Providers [ x] YES  [ ] NO  LABS:                        10.5   12.73 )-----------( 379      ( 05 Dec 2022 05:20 )             34.3     12    138  |  103  |  29<H>  ----------------------------<  103<H>  4.0   |  27  |  0.96    Ca    11.4<H>      05 Dec 2022 05:20        MEDICATIONS  (STANDING):  ampicillin/sulbactam  IVPB      ampicillin/sulbactam  IVPB 3 Gram(s) IV Intermittent every 6 hours  ferrous    sulfate 325 milliGRAM(s) Oral daily  folic acid 1 milliGRAM(s) Oral daily  lactulose Syrup 15 Gram(s) Oral every other day  lidocaine   4% Patch 1 Patch Transdermal every 24 hours  pantoprazole    Tablet 40 milliGRAM(s) Oral before breakfast  polyethylene glycol 3350 17 Gram(s) Oral daily  senna 2 Tablet(s) Oral at bedtime    MEDICATIONS  (PRN):  acetaminophen     Tablet .. 325 milliGRAM(s) Oral every 4 hours PRN Moderate Pain (4 - 6)  aluminum hydroxide/magnesium hydroxide/simethicone Suspension 30 milliLiter(s) Oral every 4 hours PRN Dyspepsia  bisacodyl Suppository 10 milliGRAM(s) Rectal daily PRN Constipation  diphenhydrAMINE 25 milliGRAM(s) Oral every 4 hours PRN Itching  morphine   Solution 5 milliGRAM(s) Oral every 3 hours PRN Severe Pain (7 - 10)  simethicone 80 milliGRAM(s) Chew every 6 hours PRN Gas    Urinalysis Basic - ( 04 Dec 2022 04:43 )    Color: Red / Appearance: very cloudy / S.010 / pH: x  Gluc: x / Ketone: Small  / Bili: Negative / Urobili: Negative   Blood: x / Protein: 500 mg/dL / Nitrite: Positive   Leuk Esterase: Moderate / RBC: >50 /HPF / WBC >50 /HPF   Sq Epi: x / Non Sq Epi: Few /HPF / Bacteria: Many /HPF        RADIOLOGY & ADDITIONAL TESTS:  Imaging Personally Reviewed:  [ ] YES  [ ] NO    < from: US Duplex Venous Lower Ext Complete, Bilateral (22 @ 13:23) >  ACC: [id number] EXAM:  US DPLX LWR EXT VEINS COMPL BI                          PROCEDURE DATE:  2022          INTERPRETATION:  CLINICAL INFORMATION: Bilateral leg pain.    COMPARISON: None available.    TECHNIQUE: Duplex sonography of the BILATERAL LOWER extremity veins with   color and spectral Doppler, with and without compression.    FINDINGS:    RIGHT:  Normal compressibility of the RIGHT common femoral, femoral and popliteal   veins.  Doppler examination shows normal spontaneous and phasic flow.  No RIGHT calf vein thrombosis is detected.    LEFT:  Normal compressibility of the LEFT common femoral, femoral and popliteal   veins.  Doppler examination shows normal spontaneous and phasic flow.  No LEFT calf vein thrombosis is detected.    IMPRESSION:  No evidence of deep venous thrombosis in either lower extremity.

## 2022-12-05 NOTE — DIETITIAN INITIAL EVALUATION ADULT - FACTORS AFF FOOD INTAKE
acute to chronic illnesses SOB hydronephrosis liver cyst parathyroid tumor, bladder cancer, pulmonary metastases./other (specify)

## 2022-12-05 NOTE — DIETITIAN INITIAL EVALUATION ADULT - NSFNSGIIOFT_GEN_A_CORE
wt history: 1/22: 99 lbs--> 3/22: 91 lbs-->> 5/22: 94 lbs-->> 6/29/22: 97.6 lbs-->>8/22: 86 lbs-->> 9/22 84.4 lbs-->>10/22: 83.8 lbs-->>12/1/22: 80.9 lbs

## 2022-12-05 NOTE — PROGRESS NOTE ADULT - PROBLEM SELECTOR PLAN 4
-in the setting of vaginal bleeding secondary to bladder cancer  -H/H stable 10.5/34.3  -ferrous sulfate

## 2022-12-05 NOTE — DIETITIAN NUTRITION RISK NOTIFICATION - TREATMENT: THE FOLLOWING DIET HAS BEEN RECOMMENDED
Diet, NPO:   NPO for Procedure/Test     NPO Start Date: 06-Dec-2022,   NPO Start Time: 23:59 (12-02-22 @ 11:19) [Active]  Diet, Regular:   DASH/TLC {Sodium & Cholesterol Restricted} (12-01-22 @ 19:43) [Active]

## 2022-12-05 NOTE — CONSULT NOTE ADULT - SUBJECTIVE AND OBJECTIVE BOX
CHIEF COMPLAINT  Dyspnea    HISTORY OF PRESENT ILLNESS  JEF HOUSE is a 56y Female who presents with a chief complaint of dyspnea.    Patient was admitted on December 1st after presenting to the Emergency Department with sudden onset of dyspnea. She has also been having vaginal bleeding. Notably, patient was admitted to [facility] in October 2022 for hemoptysis likely secondary to pulmonary metastatic disease. She has history of metastatic bladder cancer; previously followed at [facility], and was recently trying to switch to [facility].     PAST MEDICAL AND SURGICAL HISTORY  Asthma  Bladder Cancer  Hypertension  Hyperlipidemia    FAMILY HISTORY  Prostate Cancer    SOCIAL HISTORY  Denies tobacco use    REVIEW OF SYSTEMS  A complete review of systems was performed; negative except per HPI    PHYSICAL EXAM  T(C): 36.3 (12-05-22 @ 04:48), Max: 36.6 (12-04-22 @ 13:15)  HR: 71 (12-05-22 @ 04:48) (71 - 90)  BP: 115/71 (12-05-22 @ 04:48) (115/71 - 126/74)  RR: 16 (12-05-22 @ 04:48) (16 - 18)  SpO2: 100% (12-05-22 @ 04:48) (98% - 100%)  Constitutional: alert, awake, in no acute distress  Eyes: PERRL, EOMI  HEENT: normocephalic, atraumatic  Neck: supple, non-tender  Cardiovascular: normal perfusion, no peripheral edema  Respiratory: normal respiratory efforts; no increased use of accessory muscles  Gastrointestinal: soft, non-tender  Musculoskeletal: normal range of motion, no deformities noted  Neurological: alert, CN II to XI grossly intact  Skin: warm, dry    LABORATORY DATA                        10.5   12.73 )-----------( 379      ( 05 Dec 2022 05:20 )             34.3     12-05    138  |  103  |  29<H>  ----------------------------<  103<H>  4.0   |  27  |  0.96    Ca    11.4<H>      05 Dec 2022 05:20    RADIOLOGY REVIEW  No evidence of deep venous thrombosis in either lower extremity.

## 2022-12-05 NOTE — GOALS OF CARE CONVERSATION - ADVANCED CARE PLANNING - NSGCTIMESPENT_GEN_ALL_CORE
Problem: Suicide risk  Goal: Provide patient with safe environment  Description  Provide patient with safe environment  Outcome: Met This Shift 40

## 2022-12-05 NOTE — PROGRESS NOTE ADULT - PROBLEM SELECTOR PLAN 2
-extensive metastasis   -last chemo 5/2022  -chronic nephrostomy tube that was placed at [facility] for right hydronephrosis  -IR procedure 12/7 for nephrostomy tube to be changed   -Hem Onc Dr. Recio following

## 2022-12-05 NOTE — PROGRESS NOTE ADULT - PROBLEM SELECTOR PLAN 1
-in setting of anemia, pulmonary mets, elevated D Dimer  -pt refuses CTA as she has one functioning kidney and refuses V/Q scan  -supplemental oxygen PRN  -supportive measures

## 2022-12-05 NOTE — CONSULT NOTE ADULT - PROBLEM SELECTOR RECOMMENDATION 9
Pt with chronic abdominal and b/l flank pain which is somatic and neuropathic in nature due to metastatic bladder CA and right nephrostomy tube.   Opioid pain recommendations   -   Non-opioid pain recommendations   - Avoid NSIADs- pt with solitary kidney  - Continue Acetaminophen 325 mg PO q 4 hours PRN moderate pain. Monitor LFT (dose per pt request)   Bowel Regimen  - Continue Miralax 17G PO daily  - Continue lactulose 15G PO every other day per primary team  Mild pain   - Non-pharmacological pain treatment recommendations  - Warm/ Cool packs PRN   - Repositioning, imagery, relaxation, distraction.  - Physical therapy OOB if no contraindications   Recommendations discussed with primary team and RN. Pt with chronic abdominal and b/l flank pain which is somatic and neuropathic in nature due to metastatic bladder CA to lungs and right nephrostomy tube. + opioid dependence. + occasional gas discomfort. + constipation + occasional vaginal bleeding  Opioid pain recommendations   - Morphine sulf solution 5mg PO q3h PRN severe pain. Monitor for respiratory depression/sedation.    Non-opioid pain recommendations   - Avoid NSIADs- pt with solitary kidney  - Acetaminophen 325 mg PO q 4 hours PRN moderate pain. Monitor LFT (dose per pt request)  - Simethicone 80mg PO q6h PRN gas pain.  - Continue lidoderm 4% patch daily now.   Bowel Regimen  - Continue Miralax 17G PO daily  - Continue lactulose 15G PO every other day per primary team  - Continue dulcolax 10mg supp PRN constipation   Mild pain   - Non-pharmacological pain treatment recommendations  - Warm/ Cool packs PRN   - Repositioning, imagery, relaxation, distraction.  - Physical therapy OOB if no contraindications   Recommendations discussed with primary team and RN.

## 2022-12-05 NOTE — PROGRESS NOTE ADULT - ASSESSMENT
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 Patient states that she developed sudden onset dyspnea 2 days ago at rest. She was recently admitted at [facility] in October 2022 for hemoptysis and CT chest at that time showed Numerous bilateral pulmonary metastases; mild interval increase in size of a few of the largest pulmonary metastases compared to 9/20/202. Hospital course at that time was complicated by a drop in Hb for which she received blood transfusion. She also tested positive for COVID 19 but was asymptomatic. Pt has a chronic nephrostomy tube that was placed at [facility] for right hydronephrosis. She follows with Dr Kan at [facility]. Pt last received chemotherapy in may of 2022.  The ID consult requested today, 12/3/22, to assist with evaluation of UTI.    # Recurrent  UTIs-   # Leukocytosis - resolving  # Metastatic Bladder cancer - s/p ? chemo on May, 2022    would recommend:    1. Continue Unasyn based on previous Urine culture and sensitivities from 11/2022  2. Monitor WBC count . started trending down  3. Monitor kidney function   4. Pain management as needed    d/w Covering NP, [place] and patient    Attending Attestation:    Spent more than 35 minutes on total encounter, more than 50 % of the visit was spent counseling and/or coordinating care by the Attending physician.

## 2022-12-05 NOTE — CONSULT NOTE ADULT - ASSESSMENT
JEF HOUSE is a 56y Female who presents with a chief complaint of dyspnea.    Metastatic Bladder Cancer  - Patient previously followed with [facility] and at [facility]. Now in process of switching to [facility].  - Last chemotherapy was in May 2022.  - Last imaging in October 2022 had shown worsening pulmonary and liver metastases.   - No systemic therapy while inpatient.  - Consult palliative care for goals of care evaluation.   - Pain medicine evaluation noted. Optimize pain control.     Anemia  - Patient presented with severe anemia; required blood transfusions.  - In the setting of active malignancy. Noted decreased transferrin saturation indicating iron deficiency as well.  - Can administer oral iron.  - Monitor CBC and transfuse to maintain hemoglobin > 7.    Hypercalcemia  - Corrected calcium today around 12.9.  - Continue to monitor.  - IVF. Obtain PTH and PTHrP.     Dyspnea  - Patient refused CTA. Ultrasound did not show any DVT.  - On ampicillin/sulbactam.     Will continue to follow.    Reji Root MD  Hematology/Oncology  O: [phone] JEF HOUSE is a 56y Female who presents with a chief complaint of dyspnea.    Metastatic Bladder Cancer  - Patient previously followed with [facility] and at [facility]. Now in process of switching to [facility].  - Last chemotherapy was in May 2022.  - Last imaging in October 2022 had shown worsening pulmonary and liver metastases.   - No systemic therapy while inpatient.  - Consult palliative care for goals of care evaluation. Patient reports that she has upcoming appointment with new oncologist on December 16th  - Pain medicine evaluation noted. Optimize pain control.     Anemia  - Patient presented with severe anemia; required blood transfusions.  - In the setting of active malignancy. Noted decreased transferrin saturation indicating iron deficiency as well.  - Can administer oral iron.  - Monitor CBC and transfuse to maintain hemoglobin > 7.    Hypercalcemia  - Corrected calcium today around 12.9.  - Continue to monitor.  - IVF. Obtain PTH and PTHrP.     Dyspnea  - Patient refused CTA. Ultrasound did not show any DVT.  - On ampicillin/sulbactam.     Will continue to follow.    Reji Root MD  Hematology/Oncology  O: [phone]

## 2022-12-05 NOTE — CONSULT NOTE ADULT - SUBJECTIVE AND OBJECTIVE BOX
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 Patient states that she developed sudden onset dyspnea 2 days ago at rest. The dyspnoea is not alleviated or worsened by any factors. Pt also endorses vaginal bleeding for which she was evaluated by OBGYN at [facility] and was recommended further work up including TVUS but patient refused. Patient denies any chest pain. She was recently admitted at [facility] in 2022 for hemoptysis and CT chest at that time showed Numerous bilateral pulmonary metastases; mild interval increase in size of a few of the largest pulmonary metastases compared to . Hospital course at that time was complicated by a drop in Hb for which she received blood transfusion. She also tested positive for COVID 19 but was asymptomatic. Pt has a chronic nephrostomy tube that was placed at [facility] for right hydronephrosis. She follows with Dr Kan at [facility]. Pt last recieved chemotherapy in may of 2022. She denies any fevers, chest pain, abd pain.    In ED VS: T 98, Hb 7.8, WBC 15, Na 132   (01 Dec 2022 19:07)    Dopplers- No evidence of deep venous thrombosis in either lower extremity.    Pt is admitted for SOB, anemia. Received 2 units PRBC on  and . Pt with metastatic bladder CA to lungs, on chronic opioids. + right nephrostomy On morphine 5mg PO solution at home q4h PRN per istop. Pt seen and examined at bedside. Sitting in bed, reports lumbar back and b/l flank pain score 7/10 and tolerable  SCALE USED: (1-10 VNRS). Reports she recently received morphine which is helping with her pain. Pt describes pain as constant, throbbing, radiating throughout lumbar back, alleviated by pain medication, exacerbated by movement and palpation. Pt states she occasionally take morphine 6mg PO PRN if needed for pain. Denies abdominal pain at this time. Reports occasional gas pain for which she takes Gas-X at home. Requesting PRN simethicone. Pt tolerating PO diet, however has poor appetite and weight loss. Reports SOB on exertion, and per pt will need to be discharged on O2 at home. Reports lethargy, constipation with last BM  and vaginal bleeding. Requesting prune juice. Nursing staff to administer. Denies chest pain, nausea, vomiting. Patient stated goal for pain control: to be able to take deep breaths, get out of bed to chair and ambulate with tolerable pain control. Per pt, plan for oncology followup outpatient, has an appointment on .     PAST MEDICAL & SURGICAL HISTORY:  Anemia      Asthma      HLD (hyperlipidemia)      Pacemaker  [employer]      Bladder cancer      HTN (hypertension)      Parathyroid tumor      Liver cyst      Hydronephrosis  has right Nephrostomy tube - dressing intact      Bradycardia      History of renal calculi      Birthmark      H/O myomectomy      Presence of cardiac pacemaker      H/O hydronephrosis  s/p Right Perc nephrostomy tube placement 2021, exchange 2021          FAMILY HISTORY:  Family history of prostate cancer (Father)    Family history of CHF (congestive heart failure) (Father)    Family history of atrial fibrillation (Father)        Social History:  Denies any smoking, etoh , or drug use.o (01 Dec 2022 19:07)    Allergies    No Known Allergies    MEDICATIONS  (STANDING):  ampicillin/sulbactam  IVPB      ampicillin/sulbactam  IVPB 3 Gram(s) IV Intermittent every 6 hours  ferrous    sulfate 325 milliGRAM(s) Oral daily  folic acid 1 milliGRAM(s) Oral daily  lactulose Syrup 15 Gram(s) Oral every other day  lidocaine   4% Patch 1 Patch Transdermal every 24 hours  pantoprazole    Tablet 40 milliGRAM(s) Oral before breakfast  polyethylene glycol 3350 17 Gram(s) Oral daily  senna 2 Tablet(s) Oral at bedtime    MEDICATIONS  (PRN):  acetaminophen     Tablet .. 325 milliGRAM(s) Oral every 4 hours PRN Mild Pain (1 - 3)  aluminum hydroxide/magnesium hydroxide/simethicone Suspension 30 milliLiter(s) Oral every 4 hours PRN Dyspepsia  bisacodyl Suppository 10 milliGRAM(s) Rectal daily PRN Constipation  diphenhydrAMINE 25 milliGRAM(s) Oral every 4 hours PRN Itching  morphine   Solution 5 milliGRAM(s) Oral every 4 hours PRN Moderate Pain (4 - 6)      Vital Signs Last 24 Hrs  T(C): 36.3 (05 Dec 2022 13:41), Max: 36.4 (04 Dec 2022 20:07)  T(F): 97.4 (05 Dec 2022 13:41), Max: 97.5 (04 Dec 2022 20:07)  HR: 76 (05 Dec 2022 13:41) (71 - 76)  BP: 108/69 (05 Dec 2022 13:41) (108/69 - 123/67)  BP(mean): --  RR: 18 (05 Dec 2022 13:41) (16 - 18)  SpO2: 100% (05 Dec 2022 13:41) (98% - 100%)    Parameters below as of 05 Dec 2022 13:41  Patient On (Oxygen Delivery Method): room air        LABS: Reviewed.                          10.5   12.73 )-----------( 379      ( 05 Dec 2022 05:20 )             34.3     12-    138  |  103  |  29<H>  ----------------------------<  103<H>  4.0   |  27  |  0.96    Ca    11.4<H>      05 Dec 2022 05:20          Urinalysis Basic - ( 04 Dec 2022 04:43 )    Color: Red / Appearance: very cloudy / S.010 / pH: x  Gluc: x / Ketone: Small  / Bili: Negative / Urobili: Negative   Blood: x / Protein: 500 mg/dL / Nitrite: Positive   Leuk Esterase: Moderate / RBC: >50 /HPF / WBC >50 /HPF   Sq Epi: x / Non Sq Epi: Few /HPF / Bacteria: Many /HPF      CAPILLARY BLOOD GLUCOSE        COVID-19 PCR: NotDetec (01 Dec 2022 16:25)  COVID-19 PCR: NotDetec (2022 05:42)  COVID-19 PCR: Detected (2022 05:25)  COVID-19 PCR: NotDetec (30 Oct 2022 09:38)  SARS-CoV-2: NotDetec (17 Oct 2022 21:31)  COVID-19 PCR: NotDetec (14 Oct 2022 06:00)  COVID-19 PCR: Detected (12 Oct 2022 06:51)  COVID-19 PCR: Detected (10 Oct 2022 06:47)  COVID-19 PCR: Detected (07 Oct 2022 03:03)  COVID-19 PCR: Detected (04 Oct 2022 18:00)  COVID-19 PCR: Detected (26 Sep 2022 16:05)  COVID-19 PCR: NotDetec (22 Sep 2022 13:40)  COVID-19 PCR: NotDetec (20 Sep 2022 06:00)  COVID-19 PCR: NotDetec (28 Aug 2022 05:15)  COVID-19 PCR: NotDetec (22 Aug 2022 05:36)  COVID-19 PCR: NotDetec (15 Aug 2022 19:55)  SARS-CoV-2: NotDetec (2022 10:35)  COVID-19 PCR: NotDetec (2022 05:09)  COVID-19 PCR: NotDetec (2022 06:00)  COVID-19 PCR: NotDetec (2022 18:11)  SARS-CoV-2: NotDetec (2022 20:56)      Radiology: Reviewed.   < from: US Duplex Venous Lower Ext Complete, Bilateral (22 @ 13:23) >    ACC: [id number] EXAM:  US DPLX LWR EXT VEINS COMPL BI                          PROCEDURE DATE:  2022          INTERPRETATION:  CLINICAL INFORMATION: Bilateral leg pain.    COMPARISON: None available.    TECHNIQUE: Duplex sonography of the BILATERAL LOWER extremity veins with   color and spectral Doppler, with and without compression.    FINDINGS:    RIGHT:  Normal compressibility of the RIGHT common femoral, femoral and popliteal   veins.  Doppler examination shows normal spontaneous and phasic flow.  No RIGHT calf vein thrombosis is detected.    LEFT:  Normal compressibility of the LEFT common femoral, femoral and popliteal   veins.  Doppler examination shows normal spontaneous and phasic flow.  No LEFT calf vein thrombosis is detected.    IMPRESSION:  No evidence of deep venous thrombosis in either lower extremity.          --- End of Report ---            LAUREN GUERRERO MD; Attending Radiologist  This document has been electronically signed. Dec  4 2022  1:28PM    < end of copied text >      ORT Score -   Family Hx of substance abuse	Female	      Male  Alcohol 	                                           1                     3  Illegal drugs	                                   2                     3  Rx drugs                                           4 	                  4  Personal Hx of substance abuse		  Alcohol 	                                          3	                  3  Illegal drugs                                     4	                  4  Rx drugs                                            5 	                  5  Age between 16- 45 years	           1                     1  hx preadolescent sexual abuse	   3 	                  0  Psychological disease		  ADD, OCD, bipolar, schizophrenia   2	          2  Depression                                           1 	          1  Total: 0    a score of 3 or lower indicates low risk for opioid abuse		  a score of 4-7 indicates moderate risk for opioid abuse		  a score of 8 or higher indicates high risk for opioid abuse    REVIEW OF SYSTEMS:  CONSTITUTIONAL: No fever + fatigue  HEENT:  No difficulty hearing, no change in vision  NECK: No pain or stiffness  RESPIRATORY: No cough, wheezing, chills or hemoptysis; + occasional shortness of breath on exertion   CARDIOVASCULAR: No chest pain, palpitations, dizziness, or leg swelling + left chest wall pacemaker  GASTROINTESTINAL: + loss of appetite, decreased PO intake. + weight loss; + occasional abdominal pain. + bloating; No nausea, vomiting; No diarrhea + constipation.   GENITOURINARY: No dysuria, frequency, hematuria, retention or incontinence + solitary right kidney, with nephrostomy   GYN: + vaginal bleeding  MUSCULOSKELETAL: + chronic low back and flank pain; No joint swelling; + generalized upper and lower motor strength weakness, no saddle anesthesia, bowel/bladder incontinence, no falls   NEURO: No headaches, No numbness/tingling b/l LE, No weakness    PHYSICAL EXAM:  GENERAL:  + fatigued & Oriented X4, cooperative, NAD, Good concentration. Speech is clear. + cachectic   RESPIRATORY: Respirations even and unlabored. Clear to auscultation bilaterally; No rales, rhonchi, wheezing, or rubs + O2 2L NC   CARDIOVASCULAR: Normal S1/S2, regular rate and rhythm; No murmurs, rubs, or gallops. No JVD. + left chest wall pacemaker   GASTROINTESTINAL:  Soft, Nontender, Nondistended; Bowel sounds present  PERIPHERAL VASCULAR:  Extremities warm without edema. 2+ Peripheral Pulses, No cyanosis, No calf tenderness  MUSCULOSKELETAL: Motor Strength 4/5 B/L upper and lower extremities; moves all extremities equally against gravity; ROM intact; + lumbar back and flank tenderness on palpation. No paraspinal tenderness.   SKIN: Warm, dry, intact. No rashes, lesions, scars or wounds.     Risk factors associated with adverse outcomes related to opioid treatment  [ ]  Concurrent benzodiazepine use  [ ]  History/ Active substance use or alcohol use disorder  [ ] Psychiatric co-morbidity  [ ] Sleep apnea  [ ] COPD  [ ] BMI> 35  [ ] Liver dysfunction  [X ] Renal dysfunction  [ ] CHF  [ ] Smoker  [ ]  Age > 60 years    [X ]  [place]  Reviewed and Copied to Chart. See below.    Plan of care and goal oriented pain management treatment options were discussed with patient and /or primary care giver; all questions and concerns were addressed and care was aligned with patient's wishes.    Educated patient on goal oriented pain management treatment options

## 2022-12-06 NOTE — PROGRESS NOTE ADULT - ASSESSMENT
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 Patient states that she developed sudden onset dyspnea 2 days ago at rest. She was recently admitted at [facility] in October 2022 for hemoptysis and CT chest at that time showed Numerous bilateral pulmonary metastases; mild interval increase in size of a few of the largest pulmonary metastases compared to 9/20/202. Hospital course at that time was complicated by a drop in Hb for which she received blood transfusion. She also tested positive for COVID 19 but was asymptomatic. Pt has a chronic nephrostomy tube that was placed at [facility] for right hydronephrosis. She follows with Dr Kan at [facility]. Pt last received chemotherapy in may of 2022.  The ID consult requested today, 12/3/22, to assist with evaluation of UTI.    # Recurrent  UTIs-   # Leukocytosis - resolving  # Metastatic Bladder cancer - s/p ? chemo on May, 2022    would recommend:    1. Pain management as needed  2.  Continue Unasyn based on previous Urine culture and sensitivities from 11/2022  3. Monitor WBC count, is trending down  4. Monitor kidney function     d/w Patient    Attending Attestation:    Spent more than 35 minutes on total encounter, more than 50 % of the visit was spent counseling and/or coordinating care by the Attending physician.

## 2022-12-06 NOTE — PROGRESS NOTE ADULT - SUBJECTIVE AND OBJECTIVE BOX
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 Patient states that she developed sudden onset dyspnea 2 days ago at rest. The dyspnoea is not alleviated or worsened by any factors. Pt also endorses vaginal bleeding for which she was evaluated by OBGYN at [facility] and was recommended further work up including TVUS but patient refused. Patient denies any chest pain. She was recently admitted at [facility] in October 2022 for hemoptysis and CT chest at that time showed Numerous bilateral pulmonary metastases; mild interval increase in size of a few of the largest pulmonary metastases compared to 9/20/202. Hospital course at that time was complicated by a drop in Hb for which she received blood transfusion. She also tested positive for COVID 19 but was asymptomatic. Pt has a chronic nephrostomy tube that was placed at [facility] for right hydronephrosis. She follows with Dr Kan at [facility]. Pt last recieved chemotherapy in may of 2022. She denies any fevers, chest pain, abd pain.    In ED VS: T 98, Hb 7.8, WBC 15, Na 132   (01 Dec 2022 19:07)    Dopplers- No evidence of deep venous thrombosis in either lower extremity.    Pt is admitted for SOB, anemia. Received 2 units PRBC on 12/1 and 12/2. Pt with metastatic bladder CA to lungs, on chronic opioids. + right nephrostomy. On morphine 5mg PO solution at home q4h PRN per istop. Pt seen and examined at bedside. Sitting in bed, reports lumbar back and b/l flank pain score 7/10 and tolerable with current pain regimen SCALE USED: (1-10 VNRS). Pt describes pain as constant, throbbing, radiating throughout lumbar back, alleviated by pain medication, exacerbated by movement and palpation. Pt states she occasionally take morphine 6mg PO PRN if needed for pain. Denies abdominal pain at this time. Reports occasional gas pain for which she takes Gas-X at home. Pt tolerating PO diet, however has poor appetite and weight loss. Reports SOB on exertion, and per pt will need to be discharged on O2 at home. Reports lethargy, constipation with last BM 11/30 and vaginal bleeding. States she will drink prune juice today. Refusing enema/ suppository at this time. Denies chest pain, nausea, vomiting. Patient stated goal for pain control: to be able to take deep breaths, get out of bed to chair and ambulate with tolerable pain control. PT ambulating to the bathroom with tolerable pain. Per pt, plan for oncology followup outpatient, has an appointment on 12/23.     PAST MEDICAL & SURGICAL HISTORY:  Anemia      Asthma      HLD (hyperlipidemia)      Pacemaker  [employer]      Bladder cancer      HTN (hypertension)      Parathyroid tumor      Liver cyst      Hydronephrosis  has right Nephrostomy tube - dressing intact      Bradycardia      History of renal calculi      Birthmark      H/O myomectomy      Presence of cardiac pacemaker      H/O hydronephrosis  s/p Right Perc nephrostomy tube placement 02/2021, exchange 06/2021          FAMILY HISTORY:  Family history of prostate cancer (Father)    Family history of CHF (congestive heart failure) (Father)    Family history of atrial fibrillation (Father)        Social History:  Denies any smoking, etoh , or drug use.o (01 Dec 2022 19:07)    Allergies    No Known Allergies    MEDICATIONS  (STANDING):  ampicillin/sulbactam  IVPB      ampicillin/sulbactam  IVPB 3 Gram(s) IV Intermittent every 6 hours  ferrous    sulfate 325 milliGRAM(s) Oral daily  folic acid 1 milliGRAM(s) Oral daily  lactulose Syrup 15 Gram(s) Oral every other day  lidocaine   4% Patch 1 Patch Transdermal every 24 hours  pantoprazole    Tablet 40 milliGRAM(s) Oral before breakfast  polyethylene glycol 3350 17 Gram(s) Oral daily  senna 2 Tablet(s) Oral at bedtime    MEDICATIONS  (PRN):  acetaminophen     Tablet .. 325 milliGRAM(s) Oral every 4 hours PRN Moderate Pain (4 - 6)  aluminum hydroxide/magnesium hydroxide/simethicone Suspension 30 milliLiter(s) Oral every 4 hours PRN Dyspepsia  bisacodyl Suppository 10 milliGRAM(s) Rectal daily PRN Constipation  diphenhydrAMINE 25 milliGRAM(s) Oral every 4 hours PRN Itching  morphine   Solution 5 milliGRAM(s) Oral every 3 hours PRN Severe Pain (7 - 10)  simethicone 80 milliGRAM(s) Chew every 6 hours PRN Gas      Vital Signs Last 24 Hrs  T(C): 36.5 (06 Dec 2022 04:46), Max: 36.6 (05 Dec 2022 20:55)  T(F): 97.7 (06 Dec 2022 04:46), Max: 97.9 (05 Dec 2022 20:55)  HR: 69 (06 Dec 2022 04:46) (69 - 76)  BP: 119/75 (06 Dec 2022 04:46) (108/69 - 119/75)  BP(mean): --  RR: 18 (06 Dec 2022 04:46) (17 - 18)  SpO2: 100% (06 Dec 2022 04:46) (100% - 100%)    Parameters below as of 06 Dec 2022 04:46  Patient On (Oxygen Delivery Method): room air    LABS: Reviewed                          10.5   12.73 )-----------( 379      ( 05 Dec 2022 05:20 )             34.3     12-05    138  |  103  |  29<H>  ----------------------------<  103<H>  4.0   |  27  |  0.96    Ca    11.4<H>      05 Dec 2022 05:20    COVID-19 PCR: NotDetec (01 Dec 2022 16:25)  COVID-19 PCR: NotDetec (13 Nov 2022 05:42)  COVID-19 PCR: Detected (05 Nov 2022 05:25)      Radiology: Reviewed.   < from: US Duplex Venous Lower Ext Complete, Bilateral (12.04.22 @ 13:23) >    ACC: [id number] EXAM:  US DPLX LWR EXT VEINS COMPL BI                          PROCEDURE DATE:  12/04/2022          INTERPRETATION:  CLINICAL INFORMATION: Bilateral leg pain.    COMPARISON: None available.    TECHNIQUE: Duplex sonography of the BILATERAL LOWER extremity veins with   color and spectral Doppler, with and without compression.    FINDINGS:    RIGHT:  Normal compressibility of the RIGHT common femoral, femoral and popliteal   veins.  Doppler examination shows normal spontaneous and phasic flow.  No RIGHT calf vein thrombosis is detected.    LEFT:  Normal compressibility of the LEFT common femoral, femoral and popliteal   veins.  Doppler examination shows normal spontaneous and phasic flow.  No LEFT calf vein thrombosis is detected.    IMPRESSION:  No evidence of deep venous thrombosis in either lower extremity.          --- End of Report ---            LAUREN GUERRERO MD; Attending Radiologist  This document has been electronically signed. Dec  4 2022  1:28PM    < end of copied text >      ORT Score -   Family Hx of substance abuse	Female	      Male  Alcohol 	                                           1                     3  Illegal drugs	                                   2                     3  Rx drugs                                           4 	                  4  Personal Hx of substance abuse		  Alcohol 	                                          3	                  3  Illegal drugs                                     4	                  4  Rx drugs                                            5 	                  5  Age between 16- 45 years	           1                     1  hx preadolescent sexual abuse	   3 	                  0  Psychological disease		  ADD, OCD, bipolar, schizophrenia   2	          2  Depression                                           1 	          1  Total: 0    a score of 3 or lower indicates low risk for opioid abuse		  a score of 4-7 indicates moderate risk for opioid abuse		  a score of 8 or higher indicates high risk for opioid abuse    REVIEW OF SYSTEMS:  CONSTITUTIONAL: No fever + fatigue  HEENT:  No difficulty hearing, no change in vision  NECK: No pain or stiffness  RESPIRATORY: No cough, wheezing, chills or hemoptysis; + occasional shortness of breath on exertion   CARDIOVASCULAR: No chest pain, palpitations, dizziness, or leg swelling + left chest wall pacemaker  GASTROINTESTINAL: + loss of appetite, decreased PO intake. + weight loss; + occasional abdominal pain. + bloating; No nausea, vomiting; No diarrhea + constipation.   GENITOURINARY: No dysuria, frequency, hematuria, retention or incontinence + solitary right kidney, with nephrostomy   GYN: + vaginal bleeding  MUSCULOSKELETAL: + chronic low back and flank pain; No joint swelling; + generalized upper and lower motor strength weakness, no saddle anesthesia, bowel/bladder incontinence, no falls   NEURO: No headaches, No numbness/tingling b/l LE, No weakness    PHYSICAL EXAM:  GENERAL:  + fatigued & Oriented X4, cooperative, NAD, Good concentration. Speech is clear. + cachectic   RESPIRATORY: Respirations even and unlabored. Clear to auscultation bilaterally; No rales, rhonchi, wheezing, or rubs + O2 2L NC   CARDIOVASCULAR: Normal S1/S2, regular rate and rhythm; No murmurs, rubs, or gallops. No JVD. + left chest wall pacemaker   GASTROINTESTINAL:  Soft, Nontender, Nondistended; Bowel sounds present  GENITOURINARY: right flank nephrostomy tube draining clear yellow urine, dressing c/d/i   PERIPHERAL VASCULAR:  Extremities warm without edema. 2+ Peripheral Pulses, No cyanosis, No calf tenderness  MUSCULOSKELETAL: Motor Strength 4/5 B/L upper and lower extremities; moves all extremities equally against gravity; ROM intact; + lumbar back and flank tenderness on palpation. No paraspinal tenderness.   SKIN: Warm, dry, intact. No rashes, lesions, scars or wounds.     Risk factors associated with adverse outcomes related to opioid treatment  [ ]  Concurrent benzodiazepine use  [ ]  History/ Active substance use or alcohol use disorder  [ ] Psychiatric co-morbidity  [ ] Sleep apnea  [ ] COPD  [ ] BMI> 35  [ ] Liver dysfunction  [X ] Renal dysfunction  [ ] CHF  [ ] Smoker  [ ]  Age > 60 years    [X ]  [place]  Reviewed and Copied to Chart. See below.    Plan of care and goal oriented pain management treatment options were discussed with patient and /or primary care giver; all questions and concerns were addressed and care was aligned with patient's wishes.    Educated patient on goal oriented pain management treatment options     12-06-22 @ 11:16

## 2022-12-06 NOTE — PROGRESS NOTE ADULT - PROBLEM SELECTOR PLAN 3
-due to tumor extension, no acute sign or symptoms of bleeding   -h/h around pt's baseline   -transfuse PRBC if Hg <7

## 2022-12-06 NOTE — PROGRESS NOTE ADULT - PROBLEM SELECTOR PLAN 2
-extensive metastasis   -last chemo 5/2022  -chronic nephrostomy tube that was placed at [facility] for right hydronephrosis  -IR procedure on 12/7 for nephrostomy exchange    -Hem Onc Dr. Recio

## 2022-12-06 NOTE — PROGRESS NOTE ADULT - SUBJECTIVE AND OBJECTIVE BOX
Patient is seen and examined at the bed side, is afebrile.  The Urine culture has no growth.  The Leukocytosis is trending down.      REVIEW OF SYSTEMS: All other review systems are negative      ALLERGIES: No Known Allergies      Vital Signs Last 24 Hrs  T(C): 36.6 (06 Dec 2022 14:23), Max: 36.6 (05 Dec 2022 20:55)  T(F): 97.9 (06 Dec 2022 14:23), Max: 97.9 (05 Dec 2022 20:55)  HR: 72 (06 Dec 2022 14:23) (69 - 72)  BP: 102/58 (06 Dec 2022 14:23) (102/58 - 119/75)  BP(mean): --  RR: 18 (06 Dec 2022 14:23) (17 - 18)  SpO2: 100% (06 Dec 2022 14:23) (100% - 100%)    Parameters below as of 06 Dec 2022 14:23  Patient On (Oxygen Delivery Method): room air        PHYSICAL EXAM:  GENERAL: Not in distress , on oxygen via NC  CHEST/LUNG:  Not using accessory muscles   HEART: s1 and s2 present  ABDOMEN:  Nontender and  Nondistended  EXTREMITIES: No pedal  edema  CNS: Awake and Alert        LABS: No new Labs                        10.5   12.73 )-----------( 379      ( 05 Dec 2022 05:20 )             34.3                  10.3   16.33 )-----------( 330      ( 03 Dec 2022 05:41 )             32.7                12-05    138  |  103  |  29<H>  ----------------------------<  103<H>  4.0   |  27  |  0.96    Ca    11.4<H>      05 Dec 2022 05:20      12-03    135  |  102  |  27<H>  ----------------------------<  101<H>  4.5   |  26  |  0.89    Ca    11.6<H>      03 Dec 2022 05:41  Phos  3.1     12-02  Mg     1.8     12-02    TPro  7.6  /  Alb  2.1<L>  /  TBili  0.5  /  DBili  x   /  AST  21  /  ALT  20  /  AlkPhos  218<H>  12-03    PT/INR - ( 01 Dec 2022 16:25 )   PT: 15.4 sec;   INR: 1.29 ratio         PTT - ( 01 Dec 2022 16:25 )  PTT:24.9 sec        MEDICATIONS  (STANDING):    ampicillin/sulbactam  IVPB      ampicillin/sulbactam  IVPB 3 Gram(s) IV Intermittent every 6 hours  ferrous    sulfate 325 milliGRAM(s) Oral daily  folic acid 1 milliGRAM(s) Oral daily  lactulose Syrup 15 Gram(s) Oral every other day  lidocaine   4% Patch 1 Patch Transdermal every 24 hours  pantoprazole    Tablet 40 milliGRAM(s) Oral before breakfast  polyethylene glycol 3350 17 Gram(s) Oral daily  senna 2 Tablet(s) Oral at bedtime      RADIOLOGY & ADDITIONAL TESTS:    12/1/22 : Xray Chest 1 View- PORTABLE-Urgent (Xray Chest 1 View- PORTABLE-Urgent .) (12.01.22 @ 18:12) >    Left ICD. Normal heart mediastinum. Hyperinflated lungs. Multiple parenchymal nodules of varying sizes reidentified similar to prior   consistent with metastatic disease. No acute infiltrate pleural effusion  or pneumothorax.    IMPRESSION: No acute infiltrate. pulmonary metastatic nodules similar to  prior        MICROBIOLOGY DATA:      Culture - Blood (12.04.22 @ 12:36)   Specimen Source: .Blood Blood   Culture Results: No growth to date.     Culture - Urine (12.04.22 @ 04:43)   Specimen Source: Clean Catch Clean Catch (Midstream)   Culture Results:   <10,000 CFU/mL Normal Urogenital Vandana     Urine Microscopic-Add On (NC) (12.04.22 @ 04:43)   Red Blood Cell - Urine: >50 /HPF   White Blood Cell - Urine: >50 /HPF   Bacteria: Many /HPF   Epithelial Cells: Few /HPF       COVID-19 PCR (12.01.22 @ 16:25)   COVID-19 PCR: NotDetec:

## 2022-12-06 NOTE — PROGRESS NOTE ADULT - ASSESSMENT
_________________________________________________________________________________________  ========>>  M E D I C A L   A T T E N D I N G    F O L L O W  U P  N O T E  <<=========  -----------------------------------------------------------------------------------------------------    - Patient seen and examined by me earlier today.   - In summary,  [facility] is a 56y year old woman admitted with SOB  - Patient today overall doing ok, comfortable, eating fairly , pain controlled with morphine    ==================>> REVIEW OF SYSTEM <<=================    GEN: no fever, no chills, ++ pain in  back and flank, chronic , as above   RESP: feels SOB, no cough, no sputum   CVS: no chest pain, no palpitations, no edema  GI: no abdominal pain, no nausea, chronic constipation  : no dysuria, no frequency, no hematuria, + chronic vaginal bleeding due to tumor extension)   Neuro: no headache, no dizziness  Derm : no itching, no rash    ==================>> PHYSICAL EXAM <<=================    GEN: A&O X 3 , NAD , fairly comfortable, calm , somewhat anxious at times   HEENT: NCAT, PERRL, MMM, hearing intact  Neck: supple , no JVD appreciated  CVS: S1S2 , regular , No M/R/G appreciated  PULM: CTA B/L,  no W/R/R appreciated  ABD.: soft. non tender, non distended,  bowel sounds present  Extrem: intact pulses , no edema       nephrostomy tube in place and draining   PSYCH : normal mood,  anxious                             ( Note written / Date of service 12-06-22 )    ==================>> MEDICATIONS <<====================    ampicillin/sulbactam  IVPB      ampicillin/sulbactam  IVPB 3 Gram(s) IV Intermittent every 6 hours  ferrous    sulfate 325 milliGRAM(s) Oral daily  folic acid 1 milliGRAM(s) Oral daily  lactulose Syrup 15 Gram(s) Oral every other day  lidocaine   4% Patch 1 Patch Transdermal every 24 hours  pantoprazole    Tablet 40 milliGRAM(s) Oral before breakfast  polyethylene glycol 3350 17 Gram(s) Oral daily  senna 2 Tablet(s) Oral at bedtime    MEDICATIONS  (PRN):  acetaminophen     Tablet .. 325 milliGRAM(s) Oral every 4 hours PRN Moderate Pain (4 - 6)  aluminum hydroxide/magnesium hydroxide/simethicone Suspension 30 milliLiter(s) Oral every 4 hours PRN Dyspepsia  bisacodyl Suppository 10 milliGRAM(s) Rectal daily PRN Constipation  diphenhydrAMINE 25 milliGRAM(s) Oral every 4 hours PRN Itching  morphine   Solution 5 milliGRAM(s) Oral every 3 hours PRN Severe Pain (7 - 10)  simethicone 80 milliGRAM(s) Chew every 6 hours PRN Gas    ___________  Active diet:  Diet, NPO after Midnight:      NPO Start Date: 06-Dec-2022,   NPO Start Time: 23:59  Diet, Regular:   DASH/TLC Sodium & Cholesterol Restricted  ___________________    ==================>> VITAL SIGNS <<==================    Vital Signs Last 24 HrsT(C): 36.6 (12-06-22 @ 14:23)  T(F): 97.9 (12-06-22 @ 14:23), Max: 97.9 (12-05-22 @ 20:55)  HR: 72 (12-06-22 @ 14:23) (69 - 72)  BP: 102/58 (12-06-22 @ 14:23)  RR: 18 (12-06-22 @ 14:23) (17 - 18)  SpO2: 100% (12-06-22 @ 14:23) (100% - 100%)       ==================>> LAB AND IMAGING <<==================                        10.5   12.73 )-----------( 379      ( 05 Dec 2022 05:20 )             34.3        12-05    138  |  103  |  29<H>  ----------------------------<  103<H>  4.0   |  27  |  0.96    Ca    11.4<H>      05 Dec 2022 05:20      WBC count:   12.73 <<== ,  16.33 <<== ,  14.83 <<==   Hemoglobin:   10.5 <<==,  10.3 <<==,  6.9 <<==  platelets:  379 <==, 330 <==, 349 <==, 368 <==    Creatinine:  0.96  <<==, 0.89  <<==, 0.94  <<==, 1.18  <<==  Sodium:   138  <==, 135  <==, 132  <==, 132  <==       AST:          21 <== , 21 <== , 39 <==      ALT:        20  <== , 21  <== , 22  <==      AP:        218  <=, 217  <=, 205  <=     Bili:        0.5  <=, 0.3  <=, 0.3  <=    ____________________________    M I C R O B I O L O G Y :    Culture - Blood (collected 04 Dec 2022 12:36)  Source: .Blood Blood  Preliminary Report (05 Dec 2022 19:02):    No growth to date.    Culture - Urine (collected 04 Dec 2022 04:43)  Source: Clean Catch Clean Catch (Midstream)  Final Report (05 Dec 2022 10:24):    <10,000 CFU/mL Normal Urogenital Vandana    ___________________________________________________________________________________  ===============>>  A S S E S S M E N T   A N D   P L A N <<===============  ------------------------------------------------------------------------------------------    · Assessment	  56 year old female with PMHx of Asthma, Anemia, Bladder cancer with metastases to the lungs, Bradycardia, Hypertension, Dyslipidemia, Hydronephrosis, R-nephrostomy tube, Liver cyst, Parathyroid tumor, Chronic pain and 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     Problem/Plan - 1:  ·  Problem: dyspnea.   ·  Plan: Chest xray showing metastatic disease unchanged from prior : most likely cause of dyspnea ( although anemia contributing)   high risk for DVT/PE but with contraindication for AC due to active chronic vaginal bleeding due to cancer   pt on room air, saturating well not tachycardic , clinically comfortable   home medication morphine for dyspnea and pain.    Problem/Plan - 2:  ·  Problem: Anemia.   post 2 Units PRBC with good results   f/u CBC post transfusion  hem following  IV Iron as per Hem discretion        12-02-22 @ 05:45  Iron:     16 ug/dL  Iron %:   9 %  TIBC:     180 ug/dL    Problem/Plan - 3:  ·  Problem: Vaginal bleeding.   ·  Plan: hx of vaginal bleeding, refused obgyn assessment at [facility]  Hx of bladder cancer likely causing bleeding   pt seen by GYN at [facility]: no intervention possible at this time..   pt will need treatment for cancer     Problem/Plan - 4:  ·  Problem: Hyponatremia. improved   Sodium:   138  <==, 135  <==, 132  <==, 132  <==  encourage PO intake     Problem/Plan - 5:  ·  Problem: Bladder cancer.   ·  Plan: Hx of Bladder Ca with mets  last chemotherapy in may of 2022.  hem onc following    **nephrostomy tube overdue for exchange>>> arranged for Wednesday Morning with IR with anesthesia     ** pt being treated by ID for recurrent UTIS !     ** hypercalcemia     appreciated Hem onc eval    pt declined treatment    monitor    Problem/Plan - 6:  ·  Problem: Prophylactic measure.   ·  Plan: SCD  PPI.    --------------------------------------------  Case discussed with pt  Education given on findings and plan of care  ___________________________  H. NIEVES Ochoa.  Pager: [phone]

## 2022-12-06 NOTE — PROGRESS NOTE ADULT - ASSESSMENT
Confidential Drug Utilization Report  Search Terms: Zoë Bell, 1965Search Date: 12/05/2022 09:13:40 AM  The Drug Utilization Report below displays all of the controlled substance prescriptions, if any, that your patient has filled in the last twelve months. The information displayed on this report is compiled from pharmacy submissions to the Department, and accurately reflects the information as submitted by the pharmacies.    This report was requested by: Pat Mora | Reference #: [id number]    You have not added a ABI number. Keeping your ABI number(s) up to date on the My ABI # page will enable the separation of your prescriptions from others in the search results.    Others' Prescriptions  Patient Name: Zoë BellBirth Date: 1965  Address: [address] [place], [place] [address]Pxc: Female  Rx Written	Rx Dispensed	Drug	Quantity	Days Supply	Prescriber Name	Prescriber Abi #	Payment Method	Dispenser  11/26/2022	11/26/2022	morphine sulf 10 mg/5 ml soln	430ml	20	Duke Meng M	[id number]	[id number]  11/15/2022	11/21/2022	morphine sulf 10 mg/5 ml soln	75ml	5	Ash Garcia	[id number]	Medicare	[id number]    Patient Name: Zoë BellBirth Date: 1965  Address: [address] [place], [place] [address]Yes: Female  Rx Written	Rx Dispensed	Drug	Quantity	Days Supply	Prescriber Name	Prescriber Abi #	Payment Method	Dispenser  11/15/2022	11/15/2022	morphine sulf 10 mg/5 ml soln	75ml	5	Ash aGrcia	[id number]	[facility]    Patient Name: Zoë BellBirth Date: 1965  Address: [address] [facility] [address] [place], [place] [address]Pvj: Female  Rx Written	Rx Dispensed	Drug	Quantity	Days Supply	Prescriber Name	Prescriber Abi #	Payment Method	Dispenser  10/04/2022	10/15/2022	tramadol hcl 50 mg tablet	10	5	Jai Hughes [id number]	Medicare	[facility] [id number]  08/26/2022	09/01/2022	tramadol hcl 50 mg tablet	20	5	Timi Patel	[id number]	Medicare	[facility] [id number]  08/26/2022	09/01/2022	diazepam 5 mg tablet	15	5	Timi Patel	[id number]	Medicare	[facility] [id number]  * - Drugs marked with an asterisk are compound drugs. If the compound drug is made up of more than one controlled substance, then each controlled substance will be a separate row in the table.

## 2022-12-06 NOTE — CHART NOTE - NSCHARTNOTEFT_GEN_A_CORE
Met with the pt at the bedside, attempted to discuss her oncology history and goals of care.  Ms. Bell became tearful and stated she does not want to discuss anything.  She is leaving everything in God's hands.  Whatever is going to happen is up to God.  Support provided.  Palliative care will follow.

## 2022-12-06 NOTE — PROGRESS NOTE ADULT - SUBJECTIVE AND OBJECTIVE BOX
Patient is a 56y old  Female who presents with a chief complaint of dyspnea (06 Dec 2022 14:17)    OVERNIGHT EVENTS: no acute events overnight, anxious at times regarding tomorrows procedure     REVIEW OF SYSTEMS:  CONSTITUTIONAL: No fever, chills, +ve generalized pain   RESPIRATORY: No cough, SOB  CARDIOVASCULAR: No chest pain, palpitations  GASTROINTESTINAL: No abdominal pain. No nausea, vomiting, or diarrhea  GENITOURINARY: +ve dysuria  NEUROLOGICAL: No HA  MUSCULOSKELETAL: No joint pain or swelling;      T(C): 36.6 (12-06-22 @ 14:23), Max: 36.6 (12-05-22 @ 20:55)  HR: 72 (12-06-22 @ 14:23) (69 - 72)  BP: 102/58 (12-06-22 @ 14:23) (102/58 - 119/75)  RR: 18 (12-06-22 @ 14:23) (17 - 18)  SpO2: 100% (12-06-22 @ 14:23) (100% - 100%)  Wt(kg): --Vital Signs Last 24 Hrs  T(C): 36.6 (06 Dec 2022 14:23), Max: 36.6 (05 Dec 2022 20:55)  T(F): 97.9 (06 Dec 2022 14:23), Max: 97.9 (05 Dec 2022 20:55)  HR: 72 (06 Dec 2022 14:23) (69 - 72)  BP: 102/58 (06 Dec 2022 14:23) (102/58 - 119/75)  BP(mean): --  RR: 18 (06 Dec 2022 14:23) (17 - 18)  SpO2: 100% (06 Dec 2022 14:23) (100% - 100%)    Parameters below as of 06 Dec 2022 14:23  Patient On (Oxygen Delivery Method): room air    MEDICATIONS  (STANDING):  ampicillin/sulbactam  IVPB      ampicillin/sulbactam  IVPB 3 Gram(s) IV Intermittent every 6 hours  ferrous    sulfate 325 milliGRAM(s) Oral daily  folic acid 1 milliGRAM(s) Oral daily  lactulose Syrup 15 Gram(s) Oral every other day  lidocaine   4% Patch 1 Patch Transdermal every 24 hours  pantoprazole    Tablet 40 milliGRAM(s) Oral before breakfast  polyethylene glycol 3350 17 Gram(s) Oral daily  senna 2 Tablet(s) Oral at bedtime    MEDICATIONS  (PRN):  acetaminophen     Tablet .. 325 milliGRAM(s) Oral every 4 hours PRN Moderate Pain (4 - 6)  aluminum hydroxide/magnesium hydroxide/simethicone Suspension 30 milliLiter(s) Oral every 4 hours PRN Dyspepsia  bisacodyl Suppository 10 milliGRAM(s) Rectal daily PRN Constipation  diphenhydrAMINE 25 milliGRAM(s) Oral every 4 hours PRN Itching  morphine   Solution 5 milliGRAM(s) Oral every 3 hours PRN Severe Pain (7 - 10)  simethicone 80 milliGRAM(s) Chew every 6 hours PRN Gas    PHYSICAL EXAM:  GENERAL: NAD, cachectic   EYES: clear conjunctiva  ENMT: Moist mucous membranes  NECK: Supple, No JVD,  CHEST/LUNG: Clear to auscultation bilaterally; No rales, rhonchi, wheezing, or rubs  HEART: S1, S2, Regular rate and rhythm  ABDOMEN: Soft, Nontender, Nondistended; Bowel sounds present  NEURO: Alert & Oriented X3  EXTREMITIES: No LE edema, no calf tenderness  SKIN: right lower back with nephrostomy tube to leg bag, clear urine     Consultant(s) Notes Reviewed:  [x ] YES  [ ] NO  Care Discussed with Consultants/Other Providers [ x] YES  [ ] NO    LABS:                        10.5   12.73 )-----------( 379      ( 05 Dec 2022 05:20 )             34.3     12-05    138  |  103  |  29<H>  ----------------------------<  103<H>  4.0   |  27  |  0.96    Ca    11.4<H>      05 Dec 2022 05:20    CAPILLARY BLOOD GLUCOSE  RADIOLOGY & ADDITIONAL TESTS:  < from: US Duplex Venous Lower Ext Complete, Bilateral (12.04.22 @ 13:23) >    ACC: [id number] EXAM:  US DPLX LWR EXT VEINS COMPL BI                          PROCEDURE DATE:  12/04/2022          INTERPRETATION:  CLINICAL INFORMATION: Bilateral leg pain.    COMPARISON: None available.    TECHNIQUE: Duplex sonography of the BILATERAL LOWER extremity veins with   color and spectral Doppler, with and without compression.    FINDINGS:    RIGHT:  Normal compressibility of the RIGHT common femoral, femoral and popliteal   veins.  Doppler examination shows normal spontaneous and phasic flow.  No RIGHT calf vein thrombosis is detected.    LEFT:  Normal compressibility of the LEFT common femoral, femoral and popliteal   veins.  Doppler examination shows normal spontaneous and phasic flow.  No LEFT calf vein thrombosis is detected.    IMPRESSION:  No evidence of deep venous thrombosis in either lower extremity.      < end of copied text >

## 2022-12-06 NOTE — PROGRESS NOTE ADULT - PROBLEM SELECTOR PLAN 1
Pt with chronic abdominal and b/l flank pain which is somatic and neuropathic in nature due to metastatic bladder CA to lungs and right nephrostomy tube. + opioid dependence. + occasional gas discomfort. + constipation + occasional vaginal bleeding  Opioid pain recommendations   - Morphine sulf solution 5mg PO q3h PRN severe pain. Monitor for respiratory depression/sedation.    Non-opioid pain recommendations   - Avoid NSIADs- pt with solitary kidney  - Continue Acetaminophen 325 mg PO q 4 hours PRN moderate pain. Monitor LFT (dose per pt request)  - Continue Simethicone 80mg PO q6h PRN gas pain.  - Continue lidoderm 4% patch daily now.   Bowel Regimen  - Continue Miralax 17G PO daily  - Continue lactulose 15G PO every other day per primary team  - Continue dulcolax 10mg supp PRN constipation   Mild pain   - Non-pharmacological pain treatment recommendations  - Warm/ Cool packs PRN   - Repositioning, imagery, relaxation, distraction.  - Physical therapy OOB if no contraindications   Recommendations discussed with primary team and RN.

## 2022-12-06 NOTE — PROGRESS NOTE ADULT - PROBLEM SELECTOR PLAN 1
-in setting of anemia, pulmonary mets  -pt refuses CTA as she has one functioning kidney and refuses V/Q scan  -supplemental oxygen PRN  -supportive measures

## 2022-12-06 NOTE — PROGRESS NOTE ADULT - PROBLEM SELECTOR PLAN 4
-in the setting of vaginal bleeding secondary to bladder cancer and extensive metastasis   -H/H stable around pt's baseline  -cont ferrous sulfate

## 2022-12-07 NOTE — PROGRESS NOTE ADULT - ASSESSMENT
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 Pt also endorsed vaginal bleeding for which she was evaluated by OBGYN at [facility] and was recommended further work up including TVUS but patient refused. Pt has a chronic nephrostomy tube that was placed at [facility] for right hydronephrosis.  Pt last received chemotherapy in may of 2022.  Admitted to medicine for dyspnea likely in setting of Pulm mets, also found to have UTI, abx started, ID Dr. Rodriguez following. Supplemental oxygen tapered off to room air. Pt is pending nephrostomy tube change with IR today on 12/7.

## 2022-12-07 NOTE — PROGRESS NOTE ADULT - ASSESSMENT
_________________________________________________________________________________________  ========>>  M E D I C A L   A T T E N D I N G    F O L L O W  U P  N O T E  <<=========  -----------------------------------------------------------------------------------------------------    - Patient seen and examined by me earlier today.   - In summary,  JEF HOUSE is a 56y year old woman admitted with SOB  - Patient today overall doing ok, comfortable, eating fairly , pain controlled with morphine    ==================>> REVIEW OF SYSTEM <<=================    GEN: no fever, no chills, ++ pain in  back and flank, chronic   RESP: feels SOB, no cough, no sputum   CVS: no chest pain, no palpitations, no edema  GI: no abdominal pain, no nausea, chronic constipation  : no dysuria, no frequency, no hematuria,  (chronic intermittent vaginal bleeding due to tumor extension)   Neuro: no headache, no dizziness  Derm : no itching, no rash    ==================>> PHYSICAL EXAM <<=================    GEN: A&O X 3 , NAD , fairly comfortable, calm   HEENT: NCAT, PERRL, MMM, hearing intact  Neck: supple , no JVD appreciated  CVS: S1S2 , regular , No M/R/G appreciated  PULM: CTA B/L,  no W/R/R appreciated  ABD.: soft. non tender, non distended,  bowel sounds present  Extrem: intact pulses , no edema       nephrostomy tube in place and draining   PSYCH : normal mood,  anxious                             ( Note written / Date of service 12-07-22 )    ==================>> MEDICATIONS <<====================    ampicillin/sulbactam  IVPB      ampicillin/sulbactam  IVPB 3 Gram(s) IV Intermittent every 6 hours  ferrous    sulfate 325 milliGRAM(s) Oral daily  folic acid 1 milliGRAM(s) Oral daily  lactulose Syrup 15 Gram(s) Oral every other day  lidocaine   4% Patch 1 Patch Transdermal every 24 hours  pantoprazole    Tablet 40 milliGRAM(s) Oral before breakfast  polyethylene glycol 3350 17 Gram(s) Oral daily  senna 2 Tablet(s) Oral at bedtime    MEDICATIONS  (PRN):  acetaminophen     Tablet .. 325 milliGRAM(s) Oral every 4 hours PRN Moderate Pain (4 - 6)  aluminum hydroxide/magnesium hydroxide/simethicone Suspension 30 milliLiter(s) Oral every 4 hours PRN Dyspepsia  bisacodyl Suppository 10 milliGRAM(s) Rectal daily PRN Constipation  diphenhydrAMINE 25 milliGRAM(s) Oral every 4 hours PRN Itching  fentaNYL    Injectable 25 MICROGram(s) IV Push every 5 minutes PRN Moderate Pain (4 - 6)  morphine   Solution 5 milliGRAM(s) Oral every 3 hours PRN Severe Pain (7 - 10)  simethicone 80 milliGRAM(s) Chew every 6 hours PRN Gas    ___________  Active diet:  Diet, NPO after Midnight:      NPO Start Date: 06-Dec-2022,   NPO Start Time: 23:59  Diet, Regular:   DASH/TLC Sodium & Cholesterol Restricted  ___________________    ==================>> VITAL SIGNS <<==================    Vital Signs Last 24 HrsT(C): 36.7 (12-07-22 @ 12:53)  T(F): 98.1 (12-07-22 @ 12:53), Max: 98.1 (12-07-22 @ 12:53)  HR: 64 (12-07-22 @ 12:53) (60 - 70)  BP: 120/59 (12-07-22 @ 12:53)  RR: 13 (12-07-22 @ 12:53) (13 - 18)  SpO2: 100% (12-07-22 @ 12:53) (99% - 100%)       ==================>> LAB AND IMAGING <<==================                        9.9    16.89 )-----------( 348      ( 07 Dec 2022 05:30 )             32.8        WBC count:   16.89 <<== ,  12.73 <<== ,  16.33 <<==     12-07    140  |  103  |  23<H>  ----------------------------<  94  4.0   |  26  |  0.99    Ca    11.4<H>      07 Dec 2022 05:30    TPro  7.5  /  Alb  2.1<L>  /  TBili  0.4  /  DBili  x   /  AST  32  /  ALT  27  /  AlkPhos  220<H>  12-07    WBC count:   16.89 <<== ,  12.73 <<== ,  16.33 <<==   Hemoglobin:   9.9 <<==,  10.5 <<==,  10.3 <<==  platelets:  348 <==, 379 <==, 330 <==, 349 <==, 368 <==    Creatinine:  0.99  <<==, 0.96  <<==, 0.89  <<==, 0.94  <<==, 1.18  <<==  Sodium:   140  <==, 138  <==, 135  <==, 132  <==, 132  <==       AST:          32 <== , 21 <== , 21 <== , 39 <==      ALT:        27  <== , 20  <== , 21  <== , 22  <==      AP:        220  <=, 218  <=, 217  <=, 205  <=     Bili:        0.4  <=, 0.5  <=, 0.3  <=, 0.3  <=    ____________________________    M I C R O B I O L O G Y :    Culture - Blood (collected 04 Dec 2022 12:36)  Source: .Blood Blood  Preliminary Report (05 Dec 2022 19:02):    No growth to date.    Culture - Urine (collected 04 Dec 2022 04:43)  Source: Clean Catch Clean Catch (Midstream)  Final Report (05 Dec 2022 10:24):    <10,000 CFU/mL Normal Urogenital Vandana    ___________________________________________________________________________________  ===============>>  A S S E S S M E N T   A N D   P L A N <<===============  ------------------------------------------------------------------------------------------    · Assessment	  56 year old female with PMHx of Asthma, Anemia, Bladder cancer with metastases to the lungs, Bradycardia, Hypertension, Dyslipidemia, Hydronephrosis, R-nephrostomy tube, Liver cyst, Parathyroid tumor, Chronic pain and 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     Problem/Plan - 1:  ·  Problem: dyspnea.   ·  Plan: Chest xray showing metastatic disease unchanged from prior : most likely cause of dyspnea ( although anemia contributing)   high risk for DVT/PE but with contraindication for AC due to active chronic vaginal bleeding due to cancer   pt on room air, saturating well not tachycardic , clinically comfortable   home medication morphine for dyspnea and pain.    Problem/Plan - 2:  ·  Problem: Anemia.   post 2 Units PRBC with good results   f/u CBC post transfusion  hem following  IV Iron as per Hem discretion        12-02-22 @ 05:45  Iron:     16 ug/dL  Iron %:   9 %  TIBC:     180 ug/dL    *** unclear cause of leukocytosis ... ? reactive    monitor closely     Problem/Plan - 3:  ·  Problem: Vaginal bleeding.   ·  Plan: hx of vaginal bleeding, refused obgyn assessment at [facility]  Hx of bladder cancer likely causing bleeding   pt seen by GYN at [facility]: no intervention possible at this time..   pt will need treatment for cancer     Problem/Plan - 4:  ·  Problem: Hyponatremia. improved   Sodium:   138  <==, 135  <==, 132  <==, 132  <==  encourage PO intake     Problem/Plan - 5:  ·  Problem: Bladder cancer.   ·  Plan: Hx of Bladder Ca with mets  last chemotherapy in may of 2022.  hem onc following    **nephrostomy tube overdue for exchange>>> arranged for Wednesday Morning with IR with anesthesia     ** pt being treated by ID for recurrent UTIS !      monitor WBC    ** hypercalcemia     appreciated Hem onc eval    pt declined treatment but ended up taking the bisphosphonates      monitor    Problem/Plan - 6:  ·  Problem: Prophylactic measure.   ·  Plan: SCD  PPI.    --------------------------------------------  Case discussed with pt, IR, RN..   Education given on findings and plan of care  ___________________________  HLeslee Ochoa D.O.  Pager: [phone]

## 2022-12-07 NOTE — PROGRESS NOTE ADULT - ASSESSMENT
· Assessment	  JEF HOUSE is a 56y Female who presents with a chief complaint of dyspnea.    Metastatic Bladder Cancer  - Patient previously followed with [facility] and at [facility]. Now in process of switching to [facility].  - Last chemotherapy was in May 2022.she never had immunotherapy  - Last imaging in October 2022 had shown worsening pulmonary and liver metastases.   - No systemic therapy while inpatient.  - Consult palliative care for goals of care evaluation. Patient reports that she has upcoming appointment with new oncologist on December 16th  - Pain medicine evaluation noted. Optimize pain control.     Anemia  - Patient presented with severe anemia; required blood transfusions.  - In the setting of active malignancy. Noted decreased transferrin saturation indicating iron deficiency as well.  - Can administer oral iron.  - Monitor CBC and transfuse to maintain hemoglobin > 7.    Hypercalcemia  - Corrected calcium today around 12.9.   - Continue to monitor.  - IVF. Obtain PTH and PTHrP.   gentle IVF    Dyspnea  - Patient refused CTA. Ultrasound did not show any DVT.  - On ampicillin/sulbactam.     Will continue to follow.

## 2022-12-07 NOTE — PROGRESS NOTE ADULT - SUBJECTIVE AND OBJECTIVE BOX
Patient is a 56y old  Female who presents with a chief complaint of dyspnea (06 Dec 2022 14:17)    OVERNIGHT EVENTS: none noted, pt can be anxious at times on today's IR procedure     REVIEW OF SYSTEMS:  CONSTITUTIONAL: +ve generalized pain, no fever, chills,  RESPIRATORY: No cough, SOB  CARDIOVASCULAR: No chest pain, palpitations  GASTROINTESTINAL: +one time loose stool, no abdominal pain, nausea, vomiting  GENITOURINARY: no dysuria  NEUROLOGICAL: No HA  MUSCULOSKELETAL: No joint pain or swelling;      T(C): 36.6 (12-06-22 @ 14:23), Max: 36.6 (12-05-22 @ 20:55)  HR: 72 (12-06-22 @ 14:23) (69 - 72)  BP: 102/58 (12-06-22 @ 14:23) (102/58 - 119/75)  RR: 18 (12-06-22 @ 14:23) (17 - 18)  SpO2: 100% (12-06-22 @ 14:23) (100% - 100%)  Wt(kg): --Vital Signs Last 24 Hrs  T(C): 36.6 (06 Dec 2022 14:23), Max: 36.6 (05 Dec 2022 20:55)  T(F): 97.9 (06 Dec 2022 14:23), Max: 97.9 (05 Dec 2022 20:55)  HR: 72 (06 Dec 2022 14:23) (69 - 72)  BP: 102/58 (06 Dec 2022 14:23) (102/58 - 119/75)  BP(mean): --  RR: 18 (06 Dec 2022 14:23) (17 - 18)  SpO2: 100% (06 Dec 2022 14:23) (100% - 100%)    Parameters below as of 06 Dec 2022 14:23  Patient On (Oxygen Delivery Method): room air    MEDICATIONS  (STANDING):  ampicillin/sulbactam  IVPB      ampicillin/sulbactam  IVPB 3 Gram(s) IV Intermittent every 6 hours  ferrous    sulfate 325 milliGRAM(s) Oral daily  folic acid 1 milliGRAM(s) Oral daily  lactulose Syrup 15 Gram(s) Oral every other day  lidocaine   4% Patch 1 Patch Transdermal every 24 hours  pantoprazole    Tablet 40 milliGRAM(s) Oral before breakfast  polyethylene glycol 3350 17 Gram(s) Oral daily  senna 2 Tablet(s) Oral at bedtime    MEDICATIONS  (PRN):  acetaminophen     Tablet .. 325 milliGRAM(s) Oral every 4 hours PRN Moderate Pain (4 - 6)  aluminum hydroxide/magnesium hydroxide/simethicone Suspension 30 milliLiter(s) Oral every 4 hours PRN Dyspepsia  bisacodyl Suppository 10 milliGRAM(s) Rectal daily PRN Constipation  diphenhydrAMINE 25 milliGRAM(s) Oral every 4 hours PRN Itching  morphine   Solution 5 milliGRAM(s) Oral every 3 hours PRN Severe Pain (7 - 10)  simethicone 80 milliGRAM(s) Chew every 6 hours PRN Gas    PHYSICAL EXAM:  GENERAL: thin, frail, NAD  CHEST/LUNG: Clear to auscultation bilaterally; No rales, rhonchi, wheezing, or rubs  HEART: S1, S2, Regular rate and rhythm  ABDOMEN: Soft, Nontender, Nondistended; Bowel sounds present  NEURO: Alert & Oriented X3  EXTREMITIES: No LE edema, no calf tenderness  SKIN: right lower back with nephrostomy tube to leg bag, draining clear urine     Consultant(s) Notes Reviewed:  [x ] YES  [ ] NO  Care Discussed with Consultants/Other Providers [ x] YES  [ ] NO    LABS:                        10.5   12.73 )-----------( 379      ( 05 Dec 2022 05:20 )             34.3     12-05    138  |  103  |  29<H>  ----------------------------<  103<H>  4.0   |  27  |  0.96    Ca    11.4<H>      05 Dec 2022 05:20    CAPILLARY BLOOD GLUCOSE  RADIOLOGY & ADDITIONAL TESTS:  < from: US Duplex Venous Lower Ext Complete, Bilateral (12.04.22 @ 13:23) >    ACC: [id number] EXAM:  US DPLX LWR EXT VEINS COMPL BI                          PROCEDURE DATE:  12/04/2022          INTERPRETATION:  CLINICAL INFORMATION: Bilateral leg pain.    COMPARISON: None available.    TECHNIQUE: Duplex sonography of the BILATERAL LOWER extremity veins with   color and spectral Doppler, with and without compression.    FINDINGS:    RIGHT:  Normal compressibility of the RIGHT common femoral, femoral and popliteal   veins.  Doppler examination shows normal spontaneous and phasic flow.  No RIGHT calf vein thrombosis is detected.    LEFT:  Normal compressibility of the LEFT common femoral, femoral and popliteal   veins.  Doppler examination shows normal spontaneous and phasic flow.  No LEFT calf vein thrombosis is detected.    IMPRESSION:  No evidence of deep venous thrombosis in either lower extremity.      < end of copied text >

## 2022-12-07 NOTE — PHARMACOTHERAPY INTERVENTION NOTE - COMMENTS
Recommend to review the need for all laxatives: lactulose, bisacodyl, Miralax and senna.  (Pt complained of loose stool).

## 2022-12-07 NOTE — PROGRESS NOTE ADULT - PROBLEM SELECTOR PLAN 2
-extensive metastasis   -last chemo 5/2022  -chronic nephrostomy tube that was placed at [facility] for right hydronephrosis  -IR procedure on 12/7 for nephrostomy exchange    -Hem Onc Dr. Recio

## 2022-12-08 NOTE — CONSULT NOTE ADULT - SUBJECTIVE AND OBJECTIVE BOX
Consult to: Discuss complex medical decision making related to goals of care    [facility] Geriatric and Palliative Consult Service:  Dr. Kathrine Cam: cell ([phone])  Dr. 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 Patient states that she developed sudden onset dyspnea 2 days ago at rest. The dyspnoea is not alleviated or worsened by any factors. Pt also endorses vaginal bleeding for which she was evaluated by OBGYN at [facility] and was recommended further work up including TVUS but patient refused. Patient denies any chest pain. She was recently admitted at [facility] in October 2022 for hemoptysis and CT chest at that time showed Numerous bilateral pulmonary metastases; mild interval increase in size of a few of the largest pulmonary metastases compared to 9/20/202. Hospital course at that time was complicated by a drop in Hb for which she received blood transfusion. She also tested positive for COVID 19 but was asymptomatic. Pt has a chronic nephrostomy tube that was placed at [facility] for right hydronephrosis. She follows with Dr Kan at [facility]. Pt last recieved chemotherapy in may of 2022. She denies any fevers, chest pain, abd pain.    Interval hx: Pt AOX3, tearful.  Reporting "pain is bad" still refusing long acting pain regimen.     PAST MEDICAL & SURGICAL HISTORY:  Anemia      Asthma      HLD (hyperlipidemia)      Pacemaker  [employer]      Bladder cancer      HTN (hypertension)      Parathyroid tumor      Liver cyst      Hydronephrosis  has right Nephrostomy tube - dressing intact      Bradycardia      History of renal calculi      Birthmark      H/O myomectomy      Presence of cardiac pacemaker      H/O hydronephrosis  s/p Right Perc nephrostomy tube placement 02/2021, exchange 06/2021          SOCIAL HISTORY:    Admitted from:  home with family  Pt's sister Giovanna Bell  is her HCP     Jehovah's witness:  Evangel[employer]                                  Preferred Language: English    Giovanna Bell (HCP)  Phone# [phone]    FAMILY HISTORY:  Family history of prostate cancer (Father)    Family history of CHF (congestive heart failure) (Father)    Family history of atrial fibrillation (Father)    Baseline ADLs (prior to admission):    Allergies    No Known Allergies    Intolerances      Present Symptoms:   Pain: yes   Fatigue: yes  Nausea: denies  Lack of Appetite: yes  SOB: denies  Depression: yes  Anxiety: unassessed  Review of Systems: [All others negative     MEDICATIONS  (STANDING):  ampicillin/sulbactam  IVPB      ampicillin/sulbactam  IVPB 3 Gram(s) IV Intermittent every 6 hours  enoxaparin Injectable 40 milliGRAM(s) SubCutaneous every 24 hours  ferrous    sulfate 325 milliGRAM(s) Oral daily  folic acid 1 milliGRAM(s) Oral daily  lactulose Syrup 15 Gram(s) Oral every other day  lidocaine   4% Patch 1 Patch Transdermal every 24 hours  pantoprazole    Tablet 40 milliGRAM(s) Oral before breakfast  polyethylene glycol 3350 17 Gram(s) Oral daily  senna 2 Tablet(s) Oral at bedtime    MEDICATIONS  (PRN):  acetaminophen     Tablet .. 325 milliGRAM(s) Oral every 4 hours PRN Moderate Pain (4 - 6)  aluminum hydroxide/magnesium hydroxide/simethicone Suspension 30 milliLiter(s) Oral every 4 hours PRN Dyspepsia  bisacodyl Suppository 10 milliGRAM(s) Rectal daily PRN Constipation  diphenhydrAMINE 25 milliGRAM(s) Oral every 4 hours PRN Itching  morphine   Solution 5 milliGRAM(s) Oral every 3 hours PRN Severe Pain (7 - 10)  simethicone 80 milliGRAM(s) Chew every 6 hours PRN Gas      PHYSICAL EXAM:  Vital Signs Last 24 Hrs  T(C): 36.6 (08 Dec 2022 05:14), Max: 36.7 (07 Dec 2022 12:53)  T(F): 97.9 (08 Dec 2022 05:14), Max: 98.1 (07 Dec 2022 12:53)  HR: 77 (08 Dec 2022 05:14) (60 - 79)  BP: 95/56 (08 Dec 2022 05:14) (92/52 - 125/65)  BP(mean): 70 (07 Dec 2022 12:53) (68 - 77)  RR: 19 (08 Dec 2022 05:14) (13 - 19)  SpO2: 96% (08 Dec 2022 05:14) (96% - 100%)    Parameters below as of 08 Dec 2022 05:14  Patient On (Oxygen Delivery Method): room air            Palliative Performance Scale/Karnofsky Score: 30  ECOG Performance: 4    General: Frail cachetic chronically ill appearing woman, AXO3.  C/o pain   HEENT: temporal wasting, moist oropharynx, neck supple  Lungs: unlabored on RA  CV: RRR, S1S2  GI: soft non distended non tender on palpation   last BM: 12/7  : Nephrostomy tube  Musculoskeletal: weakness , minimally ambulatory  Skin: no abnormal skin lesions, poor skin turgor  Neuro: no deficits, cognitive impairment  Oral intake ability: poor po intake    LABS:                        9.3    17.21 )-----------( 311      ( 08 Dec 2022 06:49 )             30.2     12-08    140  |  103  |  18  ----------------------------<  94  3.9   |  27  |  0.93    Ca    11.3<H>      08 Dec 2022 06:49    TPro  7.5  /  Alb  2.1<L>  /  TBili  0.4  /  DBili  x   /  AST  32  /  ALT  27  /  AlkPhos  220<H>  12-07        RADIOLOGY & ADDITIONAL STUDIES: reviewed  ADVANCED DIRECTIVES:  FULL CODE         [facility] Geriatric and Palliative Consult Service:  Dr. Kathrine Cam: cell ([phone])  Dr. 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 Patient states that she developed sudden onset dyspnea 2 days ago at rest. The dyspnoea is not alleviated or worsened by any factors. Pt also endorses vaginal bleeding for which she was evaluated by OBGYN at [facility] and was recommended further work up including TVUS but patient refused. Patient denies any chest pain. She was recently admitted at [facility] in October 2022 for hemoptysis and CT chest at that time showed Numerous bilateral pulmonary metastases; mild interval increase in size of a few of the largest pulmonary metastases compared to 9/20/202. Hospital course at that time was complicated by a drop in Hb for which she received blood transfusion. She also tested positive for COVID 19 but was asymptomatic. Pt has a chronic nephrostomy tube that was placed at [facility] for right hydronephrosis. She follows with Dr Kan at [facility]. Pt last recieved chemotherapy in may of 2022. She denies any fevers, chest pain, abd pain.    Interval hx: Pt AOX3, tearful.  Reporting "pain is bad" still refusing long acting pain regimen.     PAST MEDICAL & SURGICAL HISTORY:  Anemia      Asthma      HLD (hyperlipidemia)      Pacemaker  [employer]      Bladder cancer      HTN (hypertension)      Parathyroid tumor      Liver cyst      Hydronephrosis  has right Nephrostomy tube - dressing intact      Bradycardia      History of renal calculi      Birthmark      H/O myomectomy      Presence of cardiac pacemaker      H/O hydronephrosis  s/p Right Perc nephrostomy tube placement 02/2021, exchange 06/2021          SOCIAL HISTORY:    Admitted from:  home with family  Pt has 5 children, her  sister Giovanna Bell  is her HCP     Denominational:  Islam                                  Preferred Language: English    Giovanna Bell (HCP)  Phone# [phone]    FAMILY HISTORY:  Family history of prostate cancer (Father)    Family history of CHF (congestive heart failure) (Father)    Family history of atrial fibrillation (Father)    Baseline ADLs (prior to admission):    Allergies    No Known Allergies    Intolerances      Present Symptoms:   Pain: yes   Fatigue: yes  Nausea: denies  Lack of Appetite: yes  SOB: denies  Depression: yes  Anxiety: unassessed  Review of Systems: [All others negative     MEDICATIONS  (STANDING):  ampicillin/sulbactam  IVPB      ampicillin/sulbactam  IVPB 3 Gram(s) IV Intermittent every 6 hours  enoxaparin Injectable 40 milliGRAM(s) SubCutaneous every 24 hours  ferrous    sulfate 325 milliGRAM(s) Oral daily  folic acid 1 milliGRAM(s) Oral daily  lactulose Syrup 15 Gram(s) Oral every other day  lidocaine   4% Patch 1 Patch Transdermal every 24 hours  pantoprazole    Tablet 40 milliGRAM(s) Oral before breakfast  polyethylene glycol 3350 17 Gram(s) Oral daily  senna 2 Tablet(s) Oral at bedtime    MEDICATIONS  (PRN):  acetaminophen     Tablet .. 325 milliGRAM(s) Oral every 4 hours PRN Moderate Pain (4 - 6)  aluminum hydroxide/magnesium hydroxide/simethicone Suspension 30 milliLiter(s) Oral every 4 hours PRN Dyspepsia  bisacodyl Suppository 10 milliGRAM(s) Rectal daily PRN Constipation  diphenhydrAMINE 25 milliGRAM(s) Oral every 4 hours PRN Itching  morphine   Solution 5 milliGRAM(s) Oral every 3 hours PRN Severe Pain (7 - 10)  simethicone 80 milliGRAM(s) Chew every 6 hours PRN Gas      PHYSICAL EXAM:  Vital Signs Last 24 Hrs  T(C): 36.6 (08 Dec 2022 05:14), Max: 36.7 (07 Dec 2022 12:53)  T(F): 97.9 (08 Dec 2022 05:14), Max: 98.1 (07 Dec 2022 12:53)  HR: 77 (08 Dec 2022 05:14) (60 - 79)  BP: 95/56 (08 Dec 2022 05:14) (92/52 - 125/65)  BP(mean): 70 (07 Dec 2022 12:53) (68 - 77)  RR: 19 (08 Dec 2022 05:14) (13 - 19)  SpO2: 96% (08 Dec 2022 05:14) (96% - 100%)    Parameters below as of 08 Dec 2022 05:14  Patient On (Oxygen Delivery Method): room air            Palliative Performance Scale/Karnofsky Score: 30  ECOG Performance: 4    General: Frail cachetic chronically ill appearing woman, AXO3.  C/o pain   HEENT: temporal wasting, moist oropharynx, neck supple  Lungs: unlabored on RA  CV: RRR, S1S2  GI: soft non distended non tender on palpation   last BM: 12/7  : Nephrostomy tube  Musculoskeletal: weakness , minimally ambulatory  Skin: no abnormal skin lesions, poor skin turgor  Neuro: no deficits, cognitive impairment  Oral intake ability: poor po intake    LABS:                        9.3    17.21 )-----------( 311      ( 08 Dec 2022 06:49 )             30.2     12-08    140  |  103  |  18  ----------------------------<  94  3.9   |  27  |  0.93    Ca    11.3<H>      08 Dec 2022 06:49    TPro  7.5  /  Alb  2.1<L>  /  TBili  0.4  /  DBili  x   /  AST  32  /  ALT  27  /  AlkPhos  220<H>  12-07        RADIOLOGY & ADDITIONAL STUDIES: reviewed  ADVANCED DIRECTIVES:  FULL CODE

## 2022-12-08 NOTE — PROGRESS NOTE ADULT - PROBLEM SELECTOR PLAN 1
Pt with chronic abdominal and b/l flank pain which is somatic and neuropathic in nature due to metastatic bladder CA to lungs and right nephrostomy tube (changed on 12/7). + opioid dependence. + occasional gas discomfort. + constipation + occasional vaginal bleeding  Opioid pain recommendations   - Continue Morphine sulf solution 5mg PO q3h PRN severe pain. Monitor for respiratory depression/sedation.    Non-opioid pain recommendations   - Avoid NSIADs- pt with solitary kidney  - Continue Acetaminophen 325 mg PO q 4 hours PRN moderate pain. Monitor LFT (dose per pt request)  - Continue Simethicone 80mg PO q6h PRN gas pain.  - Continue lidoderm 4% patch daily now.   Bowel Regimen  - Continue Miralax 17G PO daily  - Continue lactulose 15G PO every other day per primary team  - Continue dulcolax 10mg supp PRN constipation   Mild pain   - Non-pharmacological pain treatment recommendations  - Warm/ Cool packs PRN   - Repositioning, imagery, relaxation, distraction.  - Physical therapy OOB if no contraindications   Recommendations discussed with primary team and RN.

## 2022-12-08 NOTE — PROGRESS NOTE ADULT - ASSESSMENT
( Note written / Date of service 12-08-22 )    ==================>> MEDICATIONS <<====================    ampicillin/sulbactam  IVPB      ampicillin/sulbactam  IVPB 3 Gram(s) IV Intermittent every 6 hours  enoxaparin Injectable 40 milliGRAM(s) SubCutaneous every 24 hours  ferrous    sulfate 325 milliGRAM(s) Oral daily  folic acid 1 milliGRAM(s) Oral daily  lactulose Syrup 15 Gram(s) Oral every other day  lidocaine   4% Patch 1 Patch Transdermal every 24 hours  pantoprazole    Tablet 40 milliGRAM(s) Oral before breakfast  polyethylene glycol 3350 17 Gram(s) Oral daily  senna 2 Tablet(s) Oral at bedtime    MEDICATIONS  (PRN):  acetaminophen     Tablet .. 325 milliGRAM(s) Oral every 4 hours PRN Moderate Pain (4 - 6)  aluminum hydroxide/magnesium hydroxide/simethicone Suspension 30 milliLiter(s) Oral every 4 hours PRN Dyspepsia  bisacodyl Suppository 10 milliGRAM(s) Rectal daily PRN Constipation  diphenhydrAMINE 25 milliGRAM(s) Oral every 4 hours PRN Itching  morphine   Solution 5 milliGRAM(s) Oral every 3 hours PRN Severe Pain (7 - 10)  simethicone 80 milliGRAM(s) Chew every 6 hours PRN Gas    ___________  Active diet:  Diet, Regular:   DASH/TLC Sodium & Cholesterol Restricted  ___________________    ==================>> VITAL SIGNS <<==================    Vital Signs Last 24 HrsT(C): 36.8 (12-08-22 @ 13:29)  T(F): 98.3 (12-08-22 @ 13:29), Max: 98.3 (12-08-22 @ 13:29)  HR: 73 (12-08-22 @ 14:10) (73 - 79)  BP: 95/44 (12-08-22 @ 14:10)  RR: 18 (12-08-22 @ 13:29) (18 - 19)  SpO2: 100% (12-08-22 @ 14:10) (96% - 100%)      CAPILLARY BLOOD GLUCOSE         ==================>> LAB AND IMAGING <<==================                        9.3    17.21 )-----------( 311      ( 08 Dec 2022 06:49 )             30.2        12-08    140  |  103  |  18  ----------------------------<  94  3.9   |  27  |  0.93    Ca    11.3<H>      08 Dec 2022 06:49    TPro  7.5  /  Alb  2.1<L>  /  TBili  0.4  /  DBili  x   /  AST  32  /  ALT  27  /  AlkPhos  220<H>  12-07    WBC count:   17.21 <<== ,  16.89 <<== ,  12.73 <<==   Hemoglobin:   9.3 <<==,  9.9 <<==,  10.5 <<==  platelets:  311 <==, 348 <==, 379 <==, 330 <==    Creatinine:  0.93  <<==, 0.99  <<==, 0.96  <<==, 0.89  <<==  Sodium:   140  <==, 140  <==, 138  <==, 135  <==       AST:          32 <== , 21 <== , 21 <==      ALT:        27  <== , 20  <== , 21  <==      AP:        220  <=, 218  <=, 217  <=     Bili:        0.4  <=, 0.5  <=, 0.3  <=    ____________________________    M I C R O B I O L O G Y :    Culture - Blood (collected 04 Dec 2022 12:36)  Source: .Blood Blood  Preliminary Report (05 Dec 2022 19:02):    No growth to date.    Culture - Urine (collected 04 Dec 2022 04:43)  Source: Clean Catch Clean Catch (Midstream)  Final Report (05 Dec 2022 10:24):    <10,000 CFU/mL Normal Urogenital Vandana        COVID-19 PCR: NotDetec (12-07-22 @ 05:43)  COVID-19 PCR: NotDetec (12-01-22 @ 16:25)     _________________________________________________________________________________________  ========>>  M E D I C A L   A T T E N D I N G    F O L L O W  U P  N O T E  <<=========  -----------------------------------------------------------------------------------------------------    - Patient seen and examined by me earlier today.   - In summary,  JEF HOUSE is a 56y year old woman admitted with SOB  - Patient today overall doing ok, comfortable, eating fairly , pain controlled with morphine for the most part     ==================>> REVIEW OF SYSTEM <<=================    GEN: no fever, no chills, ++ pain in  back and flank, chronic   RESP: feels SOB, no cough, no sputum   CVS: no chest pain, no palpitations, no edema  GI: no abdominal pain, no nausea, chronic constipation  : no dysuria, no frequency, no hematuria,  (chronic intermittent vaginal bleeding due to tumor extension)   Neuro: no headache, no dizziness  Derm : no itching, no rash    ==================>> PHYSICAL EXAM <<=================    GEN: A&O X 3 , NAD , fairly comfortable, calm   HEENT: NCAT, PERRL, MMM, hearing intact  Neck: supple , no JVD appreciated  CVS: S1S2 , regular , No M/R/G appreciated  PULM: CTA B/L,  no W/R/R appreciated  ABD.: soft. non tender, non distended,  bowel sounds present  Extrem: intact pulses , no edema       nephrostomy tube in place and draining   PSYCH : normal mood,  anxious                             ( Note written / Date of service 12-08-22 )    ==================>> MEDICATIONS <<====================    ampicillin/sulbactam  IVPB      ampicillin/sulbactam  IVPB 3 Gram(s) IV Intermittent every 6 hours  enoxaparin Injectable 40 milliGRAM(s) SubCutaneous every 24 hours  ferrous    sulfate 325 milliGRAM(s) Oral daily  folic acid 1 milliGRAM(s) Oral daily  lactulose Syrup 15 Gram(s) Oral every other day  lidocaine   4% Patch 1 Patch Transdermal every 24 hours  pantoprazole    Tablet 40 milliGRAM(s) Oral before breakfast  polyethylene glycol 3350 17 Gram(s) Oral daily  senna 2 Tablet(s) Oral at bedtime    MEDICATIONS  (PRN):  acetaminophen     Tablet .. 325 milliGRAM(s) Oral every 4 hours PRN Moderate Pain (4 - 6)  aluminum hydroxide/magnesium hydroxide/simethicone Suspension 30 milliLiter(s) Oral every 4 hours PRN Dyspepsia  bisacodyl Suppository 10 milliGRAM(s) Rectal daily PRN Constipation  diphenhydrAMINE 25 milliGRAM(s) Oral every 4 hours PRN Itching  morphine   Solution 5 milliGRAM(s) Oral every 3 hours PRN Severe Pain (7 - 10)  simethicone 80 milliGRAM(s) Chew every 6 hours PRN Gas    ___________  Active diet:  Diet, Regular:   DASH/TLC Sodium & Cholesterol Restricted  ___________________    ==================>> VITAL SIGNS <<==================    Vital Signs Last 24 HrsT(C): 36.8 (12-08-22 @ 13:29)  T(F): 98.3 (12-08-22 @ 13:29), Max: 98.3 (12-08-22 @ 13:29)  HR: 73 (12-08-22 @ 14:10) (73 - 79)  BP: 95/44 (12-08-22 @ 14:10)  RR: 18 (12-08-22 @ 13:29) (18 - 19)  SpO2: 100% (12-08-22 @ 14:10) (96% - 100%)         ==================>> LAB AND IMAGING <<==================                        9.3    17.21 )-----------( 311      ( 08 Dec 2022 06:49 )             30.2        12-08    140  |  103  |  18  ----------------------------<  94  3.9   |  27  |  0.93    Ca    11.3<H>      08 Dec 2022 06:49    TPro  7.5  /  Alb  2.1<L>  /  TBili  0.4  /  DBili  x   /  AST  32  /  ALT  27  /  AlkPhos  220<H>  12-07    WBC count:   17.21 <<== ,  16.89 <<== ,  12.73 <<==   Hemoglobin:   9.3 <<==,  9.9 <<==,  10.5 <<==  platelets:  311 <==, 348 <==, 379 <==, 330 <==    Creatinine:  0.93  <<==, 0.99  <<==, 0.96  <<==, 0.89  <<==  Sodium:   140  <==, 140  <==, 138  <==, 135  <==       AST:          32 <== , 21 <== , 21 <==      ALT:        27  <== , 20  <== , 21  <==      AP:        220  <=, 218  <=, 217  <=     Bili:        0.4  <=, 0.5  <=, 0.3  <=    ____________________________    M I C R O B I O L O G Y :    Culture - Blood (collected 04 Dec 2022 12:36)  Source: .Blood Blood  Preliminary Report (05 Dec 2022 19:02):    No growth to date.    Culture - Urine (collected 04 Dec 2022 04:43)  Source: Clean Catch Clean Catch (Midstream)  Final Report (05 Dec 2022 10:24):    <10,000 CFU/mL Normal Urogenital Vandana        COVID-19 PCR: NotDetec (12-07-22 @ 05:43)  COVID-19 PCR: NotDetec (12-01-22 @ 16:25)    ___________________________________________________________________________________  ===============>>  A S S E S S M E N T   A N D   P L A N <<===============  ------------------------------------------------------------------------------------------    · Assessment	  56 year old female with PMHx of Asthma, Anemia, Bladder cancer with metastases to the lungs, Bradycardia, Hypertension, Dyslipidemia, Hydronephrosis, R-nephrostomy tube, Liver cyst, Parathyroid tumor, Chronic pain and 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     Problem/Plan - 1:  ·  Problem: dyspnea.   ·  Plan: Chest xray showing metastatic disease unchanged from prior : most likely cause of dyspnea ( although anemia contributing)   high risk for DVT/PE but with contraindication for AC due to active chronic vaginal bleeding due to cancer   pt on room air, saturating well not tachycardic , clinically comfortable   home medication morphine for dyspnea and pain.    Problem/Plan - 2:  ·  Problem: Anemia.   post 2 Units PRBC with good results   f/u CBC post transfusion  hem following  IV Iron as per Hem discretion        12-02-22 @ 05:45  Iron:     16 ug/dL  Iron %:   9 %  TIBC:     180 ug/dL    *** unclear cause of leukocytosis ... ? reactive    monitor closely     Problem/Plan - 3:  ·  Problem: Vaginal bleeding.   ·  Plan: hx of vaginal bleeding, refused obgyn assessment at [facility]  Hx of bladder cancer likely causing bleeding   pt seen by GYN at [facility]: no intervention possible at this time..   pt will need treatment for cancer     Problem/Plan - 4:  ·  Problem: Hyponatremia. improved  encourage PO intake     Problem/Plan - 5:  ·  Problem: Bladder cancer.   ·  Plan: Hx of Bladder Ca with mets  last chemotherapy in may of 2022.  hem onc following  palliative appreciated   continue current pain mgmt  patient very resistant in trying new medications    **nephrostomy tube overdue for exchange>>> changed by IR yesterday     worsened leukocytosis post pricedure / likely reactive ?     ** pt being treated by ID for recurrent UTIS !      monitor WBC    ** hypercalcemia     appreciated Hem onc eval    pt declined treatment but ended up taking the bisphosphonates      monitor    Problem/Plan - 6:  ·  Problem: Prophylactic measure.   ·  Plan: SCD  PPI.    --------------------------------------------  Case discussed with pt, RN..   Education given on findings and plan of care  ___________________________  H. NIEVES Ochoa.  Pager: [phone]

## 2022-12-08 NOTE — DISCHARGE NOTE PROVIDER - NSDCMRMEDTOKEN_GEN_ALL_CORE_FT
aluminum hydroxide-magnesium hydroxide 200 mg-200 mg/5 mL oral suspension: 30 milliliter(s) orally every 4 hours, As needed, Dyspepsia  bisacodyl 10 mg rectal suppository: 1 suppository(ies) rectal once a day, As needed, Constipation  bisacodyl 5 mg oral delayed release tablet: 1 tab(s) orally once a day (at bedtime)  Ferrex-150 oral capsule: 1 cap(s) orally once a day  folic acid 1 mg oral tablet: 1 tab(s) orally once a day  lidocaine 4% topical film: Apply topically to affected area once a day   morphine 10 mg/5 mL oral solution: 2.5 milliliter(s) orally every 4 hours, As Needed -Moderate and Severe pain MDD:20 mL per day   polyethylene glycol 3350 oral powder for reconstitution: 17 gram(s) orally once a day  senna leaf extract oral tablet: 2 tab(s) orally once a day (at bedtime)  Tylenol: 500 milligram(s) orally every 6 hours, As Needed   acetaminophen 325 mg oral tablet: 1 tab(s) orally every 4 hours  aluminum hydroxide-magnesium hydroxide 200 mg-200 mg/5 mL oral suspension: 30 milliliter(s) orally every 4 hours, As needed, Dyspepsia  bisacodyl 10 mg rectal suppository: 1 suppository(ies) rectal once a day, As needed, Constipation  Ferrex-150 oral capsule: 1 cap(s) orally once a day  folic acid 1 mg oral tablet: 1 tab(s) orally once a day  lidocaine 4% topical film: Apply topically to affected area once a day   morphine 10 mg/5 mL oral solution: 2.5 milliliter(s) orally every 4 hours, As Needed -Moderate and Severe pain MDD:20 mL per day   polyethylene glycol 3350 oral powder for reconstitution: 17 gram(s) orally once a day  senna leaf extract oral tablet: 2 tab(s) orally once a day (at bedtime)   acetaminophen 325 mg oral tablet: 1 tab(s) orally every 4 hours  aluminum hydroxide-magnesium hydroxide 200 mg-200 mg/5 mL oral suspension: 30 milliliter(s) orally every 4 hours, As needed, Dyspepsia  bisacodyl 10 mg rectal suppository: 1 suppository(ies) rectal once a day, As needed, Constipation  Ferrex-150 oral capsule: 1 cap(s) orally once a day  folic acid 1 mg oral tablet: 1 tab(s) orally once a day  Hospital Bed :   lidocaine 4% topical film: Apply topically to affected area once a day   morphine 10 mg/5 mL oral solution: 2.5 milliliter(s) orally every 4 hours, As Needed -Moderate and Severe pain MDD:20 mL per day   polyethylene glycol 3350 oral powder for reconstitution: 17 gram(s) orally once a day  senna leaf extract oral tablet: 2 tab(s) orally once a day (at bedtime)  Transport  wheel chair :

## 2022-12-08 NOTE — DISCHARGE NOTE PROVIDER - NSDCCPCAREPLAN_GEN_ALL_CORE_FT
PRINCIPAL DISCHARGE DIAGNOSIS  Diagnosis: Complicated UTI (urinary tract infection)  Assessment and Plan of Treatment: you were found to have a complicated UTI krishley in setting of chronic nephrostomy tube, you were treated with IV antibiotics and were evaluated by ID specialist, you completed the course   Follow up with PCP within 1 week   SEEK MEDICAL CARE IF:  You have back pain.  You develop a fever.  Your symptoms do not begin to resolve within 3 days.  SEEK IMMEDIATE MEDICAL CARE IF:  You have severe back pain or lower abdominal pain.  You develop chills.  You have nausea or vomiting.  You have continued burning or discomfort with urination.        SECONDARY DISCHARGE DIAGNOSES  Diagnosis: Anemia  Assessment and Plan of Treatment: Your blood count is lower than normal likely due to low baseline and bladder cancer, you did not have any sign or symptoms of bleeding   Symptoms to report, bleeding, palpitations, fatigue, pale skin, cold skin, dizziness. Take medications as prescribed   Follow up with PCP within 1 week for repeat blood work monitoring    Diagnosis: Nephrostomy status  Assessment and Plan of Treatment: you have history of hydronephrosis and have a nephrostomy tube, which was excanged in IR on 12/7   continue routine nephrostomy care    Diagnosis: Primary cancer of bladder with metastasis to other site  Assessment and Plan of Treatment: You have a history of bladder cancer. Continue to follow up with your urologist & oncologist at out/patient.        Diagnosis: Acute dyspnea  Assessment and Plan of Treatment: ypu also reported shortness of breath which was likely due to cancer extending to your lungs, your oxygen level on room air is normal   Follow up with your Hematologist/Oncologist outpatient     PRINCIPAL DISCHARGE DIAGNOSIS  Diagnosis: Complicated UTI (urinary tract infection)  Assessment and Plan of Treatment: you were found to have a complicated UTI krishley in setting of chronic nephrostomy tube, you were treated with IV antibiotics and were evaluated by ID specialist, you completed the course   Follow up with PCP within 1 week   SEEK MEDICAL CARE IF:  You have back pain.  You develop a fever.  Your symptoms do not begin to resolve within 3 days.  SEEK IMMEDIATE MEDICAL CARE IF:  You have severe back pain or lower abdominal pain.  You develop chills.  You have nausea or vomiting.  You have continued burning or discomfort with urination.        SECONDARY DISCHARGE DIAGNOSES  Diagnosis: Acute dyspnea  Assessment and Plan of Treatment: ypu also reported shortness of breath which was likely due to cancer extending to your lungs, your oxygen level on room air is normal   Follow up with your Hematologist/Oncologist outpatient    Diagnosis: Anemia  Assessment and Plan of Treatment: Your blood count is lower than normal likely due to low baseline and bladder cancer, you did not have any sign or symptoms of bleeding   Symptoms to report, bleeding, palpitations, fatigue, pale skin, cold skin, dizziness. Take medications as prescribed   Follow up with PCP within 1 week for repeat blood work monitoring    Diagnosis: Primary cancer of bladder with metastasis to other site  Assessment and Plan of Treatment: You have a history of bladder cancer. Continue to follow up with your urologist & oncologist at out/patient.        Diagnosis: Nephrostomy status  Assessment and Plan of Treatment: you have history of hydronephrosis and have a nephrostomy tube, which was excanged in IR on 12/7   continue routine nephrostomy care     PRINCIPAL DISCHARGE DIAGNOSIS  Diagnosis: Complicated UTI (urinary tract infection)  Assessment and Plan of Treatment: you were found to have a complicated UTI krishley in setting of chronic nephrostomy tube, you were treated with IV antibiotics and were evaluated by ID specialist, you completed the course   Follow up with PCP within 1 week   SEEK MEDICAL CARE IF:  You have back pain.  You develop a fever.  Your symptoms do not begin to resolve within 3 days.  SEEK IMMEDIATE MEDICAL CARE IF:  You have severe back pain or lower abdominal pain.  You develop chills.  You have nausea or vomiting.  You have continued burning or discomfort with urination.        SECONDARY DISCHARGE DIAGNOSES  Diagnosis: Acute dyspnea  Assessment and Plan of Treatment: you also reported shortness of breath which was likely due to cancer extending to your lungs, your oxygen level on room air is normal   Follow up with your Hematologist/Oncologist outpatient    Diagnosis: Anemia  Assessment and Plan of Treatment: Your blood count is lower than normal likely due to low baseline and bladder cancer, you did not have any sign or symptoms of bleeding   Symptoms to report, bleeding, palpitations, fatigue, pale skin, cold skin, dizziness. Take medications as prescribed   Follow up with PCP within 1 week for repeat blood work monitoring    Diagnosis: Primary cancer of bladder with metastasis to other site  Assessment and Plan of Treatment: You have a history of bladder cancer. Continue to follow up with your urologist & oncologist at out/patient.        Diagnosis: Nephrostomy status  Assessment and Plan of Treatment: you have history of hydronephrosis and have a nephrostomy tube, which was excanged in IR on 12/7   continue routine nephrostomy care

## 2022-12-08 NOTE — CONSULT NOTE ADULT - PROBLEM SELECTOR RECOMMENDATION 3
Pt reports pain to lower back and legs.  Refuses long acting pain regimen.  -currently on Morphine 5mg po q3h which is taking ATC and still c/o "bad" pain   -lidocaine patch    Encourage long acting; Fentanyl patch 25 mcg q72 hours  bowel regimen

## 2022-12-08 NOTE — DISCHARGE NOTE PROVIDER - NSDCFUSCHEDAPPT_GEN_ALL_CORE_FT
[facility]  Hayley Reynoso  Scheduled Appointment: 12/16/2022     [facility]  Hayley Reynoso  Scheduled Appointment: 12/23/2022

## 2022-12-08 NOTE — PROGRESS NOTE ADULT - PROBLEM SELECTOR PLAN 2
-extensive metastasis   -last chemo 5/2022  -chronic nephrostomy tube that was placed at [facility] for right hydronephrosis  -IR procedure on 12/7 for nephrostomy exchange    -Hem Onc Dr. Recio

## 2022-12-08 NOTE — CONSULT NOTE ADULT - PROBLEM SELECTOR RECOMMENDATION 6
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  Pt is struggling with adjustment disorder.    Please see discussion noted above in GOC conversation

## 2022-12-08 NOTE — PROGRESS NOTE ADULT - ASSESSMENT
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 Patient states that she developed sudden onset dyspnea 2 days ago at rest. She was recently admitted at [facility] in October 2022 for hemoptysis and CT chest at that time showed Numerous bilateral pulmonary metastases; mild interval increase in size of a few of the largest pulmonary metastases compared to 9/20/202. Hospital course at that time was complicated by a drop in Hb for which she received blood transfusion. She also tested positive for COVID 19 but was asymptomatic. Pt has a chronic nephrostomy tube that was placed at [facility] for right hydronephrosis. She follows with Dr Kan at [facility]. Pt last received chemotherapy in may of 2022.  The ID consult requested today, 12/3/22, to assist with evaluation of UTI.    # Recurrent  UTIs- Urine cx form 12/4 has no growth   # Leukocytosis -elevated   # Metastatic Bladder cancer - s/p ? chemo on May, 2022    would recommend:    1. Monitor WBC count, is trending  back up   2. Pain management as needed  3.  Continue Unasyn inpatient and may change to oral Amoxicillin on discharge to continue until 12/12/22  4. Monitor kidney function     d/w Patient and Covering NP    Attending Attestation:    Spent more than 35 minutes on total encounter, more than 50 % of the visit was spent counseling and/or coordinating care by the Attending physician.

## 2022-12-08 NOTE — DISCHARGE NOTE PROVIDER - PROVIDER TOKENS
PROVIDER:[TOKEN:[[id number]],FOLLOWUP:[1 week]],PROVIDER:[TOKEN:[[id number]],FOLLOWUP:[1 week]]

## 2022-12-08 NOTE — PROGRESS NOTE ADULT - SUBJECTIVE AND OBJECTIVE BOX
Patient is seen and examined at the bed side, is afebrile.  The Urine culture remains negative.  The Leukocytosis is worsening.       REVIEW OF SYSTEMS: All other review systems are negative      ALLERGIES: No Known Allergies      Vital Signs Last 24 Hrs  T(C): 37 (08 Dec 2022 20:46), Max: 37 (08 Dec 2022 20:46)  T(F): 98.6 (08 Dec 2022 20:46), Max: 98.6 (08 Dec 2022 20:46)  HR: 72 (08 Dec 2022 20:46) (72 - 77)  BP: 91/57 (08 Dec 2022 20:46) (91/57 - 105/56)  BP(mean): --  RR: 17 (08 Dec 2022 20:46) (17 - 19)  SpO2: 100% (08 Dec 2022 20:46) (96% - 100%)    Parameters below as of 08 Dec 2022 20:46  Patient On (Oxygen Delivery Method): nasal cannula  O2 Flow (L/min): 1        PHYSICAL EXAM:  GENERAL: Not in distress , on oxygen via NC  CHEST/LUNG:  Not using accessory muscles   HEART: s1 and s2 present  ABDOMEN:  Nontender and  Nondistended  EXTREMITIES: No pedal  edema  CNS: Awake and Alert        LABS:                         9.3    17.21 )-----------( 311      ( 08 Dec 2022 06:49 )             30.2                           10.5   12.73 )-----------( 379      ( 05 Dec 2022 05:20 )             34.3         12-08    140  |  103  |  18  ----------------------------<  94  3.9   |  27  |  0.93    Ca    11.3<H>      08 Dec 2022 06:49    TPro  7.5  /  Alb  2.1<L>  /  TBili  0.4  /  DBili  x   /  AST  32  /  ALT  27  /  AlkPhos  220<H>  12-07 12-05    138  |  103  |  29<H>  ----------------------------<  103<H>  4.0   |  27  |  0.96    Ca    11.4<H>      05 Dec 2022 05:20    TPro  7.6  /  Alb  2.1<L>  /  TBili  0.5  /  DBili  x   /  AST  21  /  ALT  20  /  AlkPhos  218<H>  12-03    PT/INR - ( 01 Dec 2022 16:25 )   PT: 15.4 sec;   INR: 1.29 ratio      PTT - ( 01 Dec 2022 16:25 )  PTT:24.9 sec        MEDICATIONS  (STANDING):  ampicillin/sulbactam  IVPB      ampicillin/sulbactam  IVPB 3 Gram(s) IV Intermittent every 6 hours  enoxaparin Injectable 40 milliGRAM(s) SubCutaneous every 24 hours  ferrous    sulfate 325 milliGRAM(s) Oral daily  folic acid 1 milliGRAM(s) Oral daily  iron sucrose Injectable 200 milliGRAM(s) IV Push once  lactulose Syrup 15 Gram(s) Oral every other day  lidocaine   4% Patch 1 Patch Transdermal every 24 hours  pantoprazole    Tablet 40 milliGRAM(s) Oral before breakfast  polyethylene glycol 3350 17 Gram(s) Oral daily  senna 2 Tablet(s) Oral at bedtime      RADIOLOGY & ADDITIONAL TESTS:    12/1/22 : Xray Chest 1 View- PORTABLE-Urgent (Xray Chest 1 View- PORTABLE-Urgent .) (12.01.22 @ 18:12) >    Left ICD. Normal heart mediastinum. Hyperinflated lungs. Multiple parenchymal nodules of varying sizes reidentified similar to prior   consistent with metastatic disease. No acute infiltrate pleural effusion  or pneumothorax.    IMPRESSION: No acute infiltrate. pulmonary metastatic nodules similar to  prior        MICROBIOLOGY DATA:      Culture - Blood (12.04.22 @ 12:36)   Specimen Source: .Blood Blood   Culture Results: No growth to date.     Culture - Urine (12.04.22 @ 04:43)   Specimen Source: Clean Catch Clean Catch (Midstream)   Culture Results:   <10,000 CFU/mL Normal Urogenital Vandana     Urine Microscopic-Add On (NC) (12.04.22 @ 04:43)   Red Blood Cell - Urine: >50 /HPF   White Blood Cell - Urine: >50 /HPF   Bacteria: Many /HPF   Epithelial Cells: Few /HPF       COVID-19 PCR (12.01.22 @ 16:25)   COVID-19 PCR: NotDetec:

## 2022-12-08 NOTE — PROGRESS NOTE ADULT - ASSESSMENT
· Assessment	  JEF HOUSE is a 56y Female who presents with a chief complaint of dyspnea.    Metastatic Bladder Cancer  - Patient previously followed with [facility] and at [facility]. Now in process of switching to [facility].  - Last chemotherapy was in May 2022.she never had immunotherapy  - Last imaging in October 2022 had shown worsening pulmonary and liver metastases.   - No systemic therapy while inpatient.  - Consult palliative care for goals of care evaluation. Patient reports that she has upcoming appointment with new oncologist on December 16th  - Pain medicine evaluation noted. Optimize pain control.     Anemia  - Patient presented with severe anemia; required blood transfusions.  - In the setting of active malignancy. Noted decreased transferrin saturation indicating iron deficiency as well.  - started on oral iron.  - Monitor CBC and transfuse to maintain hemoglobin > 7.    Hypercalcemia  - overall stable  - cont IVF  - check MM labs, PTH, PTHrP  - consider endo eval    Dyspnea  - Patient refused CTA. Ultrasound did not show any DVT.  - On ampicillin/sulbactam.   - ID following    nephrostomy tube exchanged    Will follow

## 2022-12-08 NOTE — CONSULT NOTE ADULT - CONVERSATION DETAILS
Met with the pt at the bedside AOX3.  Pt refuses to discuss her oncology history because she is overwhelmed.  She is leaving it in God's hands.  She reports generalized "bad pain" despite Morphine on board, yet refuses long acting regimen.  Pt is suffering from adjustment disorder and will not engage about her clinical condition.  Her sister Giovanna is her HCP.   Spoke with Giovanna and provided update regarding pt's diagnosis, prognosis and discussions w the pt.  She stated she was unaware of the disease progression.  Pt has 5 children whom she plans on speaking with to provide concerted support.   Pt refused to discuss advanced care planning.  Remains a FULL CODE.  Pt dis agree for house calls with [facility].  Referral sent.   Chaplaincy offered, pt deferred.  All questions answered.  Support provided.   d/w primary team and hem/onc.

## 2022-12-08 NOTE — PROGRESS NOTE ADULT - SUBJECTIVE AND OBJECTIVE BOX
pt seen, feeling fine, tired, not well overall but no new complaints.    MEDICATIONS  (STANDING):  ampicillin/sulbactam  IVPB      ampicillin/sulbactam  IVPB 3 Gram(s) IV Intermittent every 6 hours  enoxaparin Injectable 40 milliGRAM(s) SubCutaneous every 24 hours  ferrous    sulfate 325 milliGRAM(s) Oral daily  folic acid 1 milliGRAM(s) Oral daily  lactulose Syrup 15 Gram(s) Oral every other day  lidocaine   4% Patch 1 Patch Transdermal every 24 hours  pantoprazole    Tablet 40 milliGRAM(s) Oral before breakfast  polyethylene glycol 3350 17 Gram(s) Oral daily  senna 2 Tablet(s) Oral at bedtime    MEDICATIONS  (PRN):  acetaminophen     Tablet .. 325 milliGRAM(s) Oral every 4 hours PRN Moderate Pain (4 - 6)  aluminum hydroxide/magnesium hydroxide/simethicone Suspension 30 milliLiter(s) Oral every 4 hours PRN Dyspepsia  bisacodyl Suppository 10 milliGRAM(s) Rectal daily PRN Constipation  diphenhydrAMINE 25 milliGRAM(s) Oral every 4 hours PRN Itching  morphine   Solution 5 milliGRAM(s) Oral every 3 hours PRN Severe Pain (7 - 10)  simethicone 80 milliGRAM(s) Chew every 6 hours PRN Gas      ROS  limited 2/2 participation    Vital Signs Last 24 Hrs  T(C): 36.8 (08 Dec 2022 13:29), Max: 36.8 (08 Dec 2022 13:29)  T(F): 98.3 (08 Dec 2022 13:29), Max: 98.3 (08 Dec 2022 13:29)  HR: 75 (08 Dec 2022 13:29) (75 - 79)  BP: 105/56 (08 Dec 2022 13:29) (92/52 - 105/56)  BP(mean): --  RR: 18 (08 Dec 2022 13:29) (18 - 19)  SpO2: 100% (08 Dec 2022 13:29) (96% - 100%)    Parameters below as of 08 Dec 2022 13:29  Patient On (Oxygen Delivery Method): room air        PE  NAD  Awake, alert  cachectic  limited 2/2 participation, covid pandemic                          9.3    17.21 )-----------( 311      ( 08 Dec 2022 06:49 )             30.2       12-08    140  |  103  |  18  ----------------------------<  94  3.9   |  27  |  0.93    Ca    11.3<H>      08 Dec 2022 06:49    TPro  7.5  /  Alb  2.1<L>  /  TBili  0.4  /  DBili  x   /  AST  32  /  ALT  27  /  AlkPhos  220<H>  12-07

## 2022-12-08 NOTE — PHYSICAL THERAPY INITIAL EVALUATION ADULT - ADDITIONAL COMMENTS
Pt reports living in a house w/ her daughter and sister. She reports using a cane at baseline and having 12-14 steps to negotiate to get to the second floor. She reports receiving help as needed from her daughter or sister w/ ADL's.

## 2022-12-08 NOTE — DISCHARGE NOTE PROVIDER - NSDCCAREPROVSEEN_GEN_ALL_CORE_FT
Candelario Ochoa Candelario Ericorbzehra Guzmán  Unknown Doctor  Priscila Simmons  Team [facility] Palliative Care  Team [facility] Pain Service  Team [facility] ACP - NP Service      [ Greater than 35 min spent for discharge services.   LUIS ANGEL Ochoa ]

## 2022-12-08 NOTE — CONSULT NOTE ADULT - PROBLEM SELECTOR RECOMMENDATION 9
Hx of bladder cancer previously followed with [facility] and at [facility]. Now in process of switching to [facility].  Last chemotherapy was in May 2022.  ECOG 3-4  Last imaging in October 2022 had shown worsening pulmonary and liver metastases. Hem/onc following       Pt wants to continue with treatment.  Having a challenge accepting any treatment would only be for palliation and symptom management as there is no cure.   Patient is high risk of readmission, high risk of mortality within few months    Remains a FULL CODE

## 2022-12-08 NOTE — DISCHARGE NOTE PROVIDER - CARE PROVIDER_API CALL
Priscila Rosario  CARDIOVASCULAR DISEASE  [address], [address]  [place] [address]  Phone: [phone]  Fax: [phone]  Follow Up Time: 1 week    Renay Trinidad)  Urology  [address] [place] [id number]  Phone: [phone]  Fax: [phone]  Follow Up Time: 1 week

## 2022-12-08 NOTE — PROGRESS NOTE ADULT - SUBJECTIVE AND OBJECTIVE BOX
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 Patient states that she developed sudden onset dyspnea 2 days ago at rest. The dyspnoea is not alleviated or worsened by any factors. Pt also endorses vaginal bleeding for which she was evaluated by OBGYN at [facility] and was recommended further work up including TVUS but patient refused. Patient denies any chest pain. She was recently admitted at [facility] in October 2022 for hemoptysis and CT chest at that time showed Numerous bilateral pulmonary metastases; mild interval increase in size of a few of the largest pulmonary metastases compared to 9/20/202. Hospital course at that time was complicated by a drop in Hb for which she received blood transfusion. She also tested positive for COVID 19 but was asymptomatic. Pt has a chronic nephrostomy tube that was placed at [facility] for right hydronephrosis. She follows with Dr Kan at [facility]. Pt last recieved chemotherapy in may of 2022. She denies any fevers, chest pain, abd pain.    In ED VS: T 98, Hb 7.8, WBC 15, Na 132   (01 Dec 2022 19:07)    Dopplers- No evidence of deep venous thrombosis in either lower extremity.    Pt is admitted for SOB, anemia. Received 2 units PRBC on 12/1 and 12/2. Pt with metastatic bladder CA to lungs, on chronic opioids. + right nephrostomy (nephrostomy tube changed on 12/7). On morphine 5mg PO solution at home q4h PRN per istop. Pt seen and examined at bedside. Sitting in bed, reports lumbar back and b/l flank pain score 7/10 and tolerable with current pain regimen SCALE USED: (1-10 VNRS). Pt describes pain as constant, throbbing, radiating throughout lumbar back, alleviated by pain medication, exacerbated by movement and palpation. Pt states she occasionally take morphine 6mg PO PRN if needed for pain. Denies abdominal pain at this time. Reports occasional gas pain for which she takes Gas-X at home. Pt tolerating PO diet, however has poor appetite and weight loss. Reports SOB on exertion, and per pt will need to be discharged on O2 at home. Reports lethargy, constipation and vaginal bleeding. Last BM 12/7 after suppository administered. Denies chest pain, nausea, vomiting. Patient stated goal for pain control: to be able to take deep breaths, get out of bed to chair and ambulate with tolerable pain control. PT ambulating to the bathroom with tolerable pain. Per pt, plan for oncology followup outpatient, has an appointment on 12/23.     PAST MEDICAL & SURGICAL HISTORY:  Anemia      Asthma      HLD (hyperlipidemia)      Pacemaker  [employer]      Bladder cancer      HTN (hypertension)      Parathyroid tumor      Liver cyst      Hydronephrosis  has right Nephrostomy tube - dressing intact      Bradycardia      History of renal calculi      Birthmark      H/O myomectomy      Presence of cardiac pacemaker      H/O hydronephrosis  s/p Right Perc nephrostomy tube placement 02/2021, exchange 06/2021          FAMILY HISTORY:  Family history of prostate cancer (Father)    Family history of CHF (congestive heart failure) (Father)    Family history of atrial fibrillation (Father)        Social History:  Denies any smoking, etoh , or drug use.o (01 Dec 2022 19:07)    Allergies    No Known Allergies    MEDICATIONS  (STANDING):  ampicillin/sulbactam  IVPB      ampicillin/sulbactam  IVPB 3 Gram(s) IV Intermittent every 6 hours  enoxaparin Injectable 40 milliGRAM(s) SubCutaneous every 24 hours  ferrous    sulfate 325 milliGRAM(s) Oral daily  folic acid 1 milliGRAM(s) Oral daily  lactulose Syrup 15 Gram(s) Oral every other day  lidocaine   4% Patch 1 Patch Transdermal every 24 hours  pantoprazole    Tablet 40 milliGRAM(s) Oral before breakfast  polyethylene glycol 3350 17 Gram(s) Oral daily  senna 2 Tablet(s) Oral at bedtime    MEDICATIONS  (PRN):  acetaminophen     Tablet .. 325 milliGRAM(s) Oral every 4 hours PRN Moderate Pain (4 - 6)  aluminum hydroxide/magnesium hydroxide/simethicone Suspension 30 milliLiter(s) Oral every 4 hours PRN Dyspepsia  bisacodyl Suppository 10 milliGRAM(s) Rectal daily PRN Constipation  diphenhydrAMINE 25 milliGRAM(s) Oral every 4 hours PRN Itching  morphine   Solution 5 milliGRAM(s) Oral every 3 hours PRN Severe Pain (7 - 10)  simethicone 80 milliGRAM(s) Chew every 6 hours PRN Gas      Vital Signs Last 24 Hrs  T(C): 36.6 (08 Dec 2022 05:14), Max: 36.7 (07 Dec 2022 12:53)  T(F): 97.9 (08 Dec 2022 05:14), Max: 98.1 (07 Dec 2022 12:53)  HR: 77 (08 Dec 2022 05:14) (60 - 79)  BP: 95/56 (08 Dec 2022 05:14) (92/52 - 125/65)  BP(mean): 70 (07 Dec 2022 12:53) (68 - 77)  RR: 19 (08 Dec 2022 05:14) (13 - 19)  SpO2: 96% (08 Dec 2022 05:14) (96% - 100%)    Parameters below as of 08 Dec 2022 05:14  Patient On (Oxygen Delivery Method): room air      COVID-19 PCR: NotDetec (07 Dec 2022 05:43)  COVID-19 PCR: NotDetec (01 Dec 2022 16:25)  COVID-19 PCR: NotDetec (13 Nov 2022 05:42)  COVID-19 PCR: Detected (05 Nov 2022 05:25)    LABS: Reviewed                          9.3    17.21 )-----------( 311      ( 08 Dec 2022 06:49 )             30.2     12-08    140  |  103  |  18  ----------------------------<  94  3.9   |  27  |  0.93    Ca    11.3<H>      08 Dec 2022 06:49    TPro  7.5  /  Alb  2.1<L>  /  TBili  0.4  /  DBili  x   /  AST  32  /  ALT  27  /  AlkPhos  220<H>  12-07    PT/INR - ( 07 Dec 2022 05:30 )   PT: 14.3 sec;   INR: 1.20 ratio         PTT - ( 07 Dec 2022 05:30 )  PTT:27.3 sec  LIVER FUNCTIONS - ( 07 Dec 2022 05:30 )  Alb: 2.1 g/dL / Pro: 7.5 g/dL / ALK PHOS: 220 U/L / ALT: 27 U/L DA / AST: 32 U/L / GGT: x           Radiology: Reviewed.   < from: US Duplex Venous Lower Ext Complete, Bilateral (12.04.22 @ 13:23) >    ACC: [id number] EXAM:  US DPLX LWR EXT VEINS COMPL BI                          PROCEDURE DATE:  12/04/2022          INTERPRETATION:  CLINICAL INFORMATION: Bilateral leg pain.    COMPARISON: None available.    TECHNIQUE: Duplex sonography of the BILATERAL LOWER extremity veins with   color and spectral Doppler, with and without compression.    FINDINGS:    RIGHT:  Normal compressibility of the RIGHT common femoral, femoral and popliteal   veins.  Doppler examination shows normal spontaneous and phasic flow.  No RIGHT calf vein thrombosis is detected.    LEFT:  Normal compressibility of the LEFT common femoral, femoral and popliteal   veins.  Doppler examination shows normal spontaneous and phasic flow.  No LEFT calf vein thrombosis is detected.    IMPRESSION:  No evidence of deep venous thrombosis in either lower extremity.          --- End of Report ---            LAUREN GUERRERO MD; Attending Radiologist  This document has been electronically signed. Dec  4 2022  1:28PM    < end of copied text >      ORT Score -   Family Hx of substance abuse	Female	      Male  Alcohol 	                                           1                     3  Illegal drugs	                                   2                     3  Rx drugs                                           4 	                  4  Personal Hx of substance abuse		  Alcohol 	                                          3	                  3  Illegal drugs                                     4	                  4  Rx drugs                                            5 	                  5  Age between 16- 45 years	           1                     1  hx preadolescent sexual abuse	   3 	                  0  Psychological disease		  ADD, OCD, bipolar, schizophrenia   2	          2  Depression                                           1 	          1  Total: 0    a score of 3 or lower indicates low risk for opioid abuse		  a score of 4-7 indicates moderate risk for opioid abuse		  a score of 8 or higher indicates high risk for opioid abuse    REVIEW OF SYSTEMS:  CONSTITUTIONAL: No fever + fatigue  HEENT:  No difficulty hearing, no change in vision  NECK: No pain or stiffness  RESPIRATORY: No cough, wheezing, chills or hemoptysis; + occasional shortness of breath on exertion   CARDIOVASCULAR: No chest pain, palpitations, dizziness, or leg swelling + left chest wall pacemaker  GASTROINTESTINAL: + loss of appetite, decreased PO intake. + weight loss; + occasional abdominal pain. + bloating; No nausea, vomiting; No diarrhea + constipation.   GENITOURINARY: No dysuria, frequency, hematuria, retention or incontinence + solitary right kidney, with nephrostomy   GYN: + vaginal bleeding  MUSCULOSKELETAL: + chronic low back and flank pain; No joint swelling; + generalized upper and lower motor strength weakness, no saddle anesthesia, bowel/bladder incontinence, no falls   NEURO: No headaches, No numbness/tingling b/l LE, No weakness    PHYSICAL EXAM:  GENERAL:  + fatigued & Oriented X4, cooperative, NAD, Good concentration. Speech is clear. + cachectic   RESPIRATORY: Respirations even and unlabored. Clear to auscultation bilaterally; No rales, rhonchi, wheezing, or rubs + O2 1L NC   CARDIOVASCULAR: Normal S1/S2, regular rate and rhythm; No murmurs, rubs, or gallops. No JVD. + left chest wall pacemaker   GASTROINTESTINAL:  Soft, Nontender, Nondistended; Bowel sounds present  GENITOURINARY: right flank nephrostomy tube draining clear yellow urine, dressing c/d/i   PERIPHERAL VASCULAR:  Extremities warm without edema. 2+ Peripheral Pulses, No cyanosis, No calf tenderness  MUSCULOSKELETAL: Motor Strength 4/5 B/L upper and lower extremities; moves all extremities equally against gravity; ROM intact; + lumbar back and flank tenderness on palpation. No paraspinal tenderness.   SKIN: Warm, dry, intact. No rashes, lesions, scars or wounds.     Risk factors associated with adverse outcomes related to opioid treatment  [ ]  Concurrent benzodiazepine use  [ ]  History/ Active substance use or alcohol use disorder  [ ] Psychiatric co-morbidity  [ ] Sleep apnea  [ ] COPD  [ ] BMI> 35  [ ] Liver dysfunction  [X ] Renal dysfunction  [ ] CHF  [ ] Smoker  [ ]  Age > 60 years    [X ]  [place]  Reviewed and Copied to Chart. See below.    Plan of care and goal oriented pain management treatment options were discussed with patient and /or primary care giver; all questions and concerns were addressed and care was aligned with patient's wishes.    Educated patient on goal oriented pain management treatment options     12-08-22 @ 12:10

## 2022-12-08 NOTE — PROGRESS NOTE ADULT - SUBJECTIVE AND OBJECTIVE BOX
NP Note discussed with  Primary Attending    Patient is a 56y old  Female who presents with a chief complaint of dyspnea (08 Dec 2022 12:09)      INTERVAL HPI/OVERNIGHT EVENTS: Patient seen and examined at bedside    MEDICATIONS  (STANDING):  ampicillin/sulbactam  IVPB      ampicillin/sulbactam  IVPB 3 Gram(s) IV Intermittent every 6 hours  enoxaparin Injectable 40 milliGRAM(s) SubCutaneous every 24 hours  ferrous    sulfate 325 milliGRAM(s) Oral daily  folic acid 1 milliGRAM(s) Oral daily  lactulose Syrup 15 Gram(s) Oral every other day  lidocaine   4% Patch 1 Patch Transdermal every 24 hours  pantoprazole    Tablet 40 milliGRAM(s) Oral before breakfast  polyethylene glycol 3350 17 Gram(s) Oral daily  senna 2 Tablet(s) Oral at bedtime    MEDICATIONS  (PRN):  acetaminophen     Tablet .. 325 milliGRAM(s) Oral every 4 hours PRN Moderate Pain (4 - 6)  aluminum hydroxide/magnesium hydroxide/simethicone Suspension 30 milliLiter(s) Oral every 4 hours PRN Dyspepsia  bisacodyl Suppository 10 milliGRAM(s) Rectal daily PRN Constipation  diphenhydrAMINE 25 milliGRAM(s) Oral every 4 hours PRN Itching  morphine   Solution 5 milliGRAM(s) Oral every 3 hours PRN Severe Pain (7 - 10)  simethicone 80 milliGRAM(s) Chew every 6 hours PRN Gas      __________________________________________________  REVIEW OF SYSTEMS:    CONSTITUTIONAL: generalized pain, No fever,   EYES: no acute visual disturbances  NECK: No pain or stiffness  RESPIRATORY: No cough; No shortness of breath  CARDIOVASCULAR: No chest pain, no palpitations  GASTROINTESTINAL: No pain. No nausea or vomiting; No diarrhea   NEUROLOGICAL: No headache or numbness, no tremors  MUSCULOSKELETAL: No joint pain, no muscle pain  GENITOURINARY: no dysuria, no frequency, no hesitancy  PSYCHIATRY: no depression , no anxiety  ALL OTHER  ROS negative        Vital Signs Last 24 Hrs  T(C): 36.8 (08 Dec 2022 13:29), Max: 36.8 (08 Dec 2022 13:29)  T(F): 98.3 (08 Dec 2022 13:29), Max: 98.3 (08 Dec 2022 13:29)  HR: 75 (08 Dec 2022 13:29) (75 - 79)  BP: 105/56 (08 Dec 2022 13:29) (92/52 - 105/56)  BP(mean): --  RR: 18 (08 Dec 2022 13:29) (18 - 19)  SpO2: 100% (08 Dec 2022 13:29) (96% - 100%)    Parameters below as of 08 Dec 2022 13:29  Patient On (Oxygen Delivery Method): room air        ________________________________________________  PHYSICAL EXAM:  GENERAL: NAD  HEENT: Normocephalic;  conjunctivae and sclerae clear; moist mucous membranes;   NECK : supple  CHEST/LUNG: Clear to auscultation bilaterally with good air entry   HEART: S1 S2  regular; no murmurs, gallops or rubs  ABDOMEN: Soft, Nontender, Nondistended; Bowel sounds present  EXTREMITIES: no cyanosis; no edema; no calf tenderness  SKIN: temporal and clavicular muscle wasting;   NERVOUS SYSTEM:  Awake and alert; Oriented  to place, person and time ; no new deficits  : right nephrostomy tube replaced 12-7-22    _________________________________________________  LABS:                        9.3    17.21 )-----------( 311      ( 08 Dec 2022 06:49 )             30.2     12-08    140  |  103  |  18  ----------------------------<  94  3.9   |  27  |  0.93    Ca    11.3<H>      08 Dec 2022 06:49    TPro  7.5  /  Alb  2.1<L>  /  TBili  0.4  /  DBili  x   /  AST  32  /  ALT  27  /  AlkPhos  220<H>  12-07    PT/INR - ( 07 Dec 2022 05:30 )   PT: 14.3 sec;   INR: 1.20 ratio         PTT - ( 07 Dec 2022 05:30 )  PTT:27.3 sec    CAPILLARY BLOOD GLUCOSE      RADIOLOGY & ADDITIONAL TESTS:  < from: US Duplex Venous Lower Ext Complete, Bilateral (12.04.22 @ 13:23) >  FINDINGS:    RIGHT:  Normal compressibility of the RIGHT common femoral, femoral and popliteal   veins.  Doppler examination shows normal spontaneous and phasic flow.  No RIGHT calf vein thrombosis is detected.    LEFT:  Normal compressibility of the LEFT common femoral, femoral and popliteal   veins.  Doppler examination shows normal spontaneous and phasic flow.  No LEFT calf vein thrombosis is detected.    IMPRESSION:  No evidence of deep venous thrombosis in either lower extremity.          --- End of Report ---    < end of copied text >  < from: Xray Chest 1 View- PORTABLE-Urgent (Xray Chest 1 View- PORTABLE-Urgent .) (12.01.22 @ 18:12) >  INTERPRETATION:  Short of breath.    AP chest. Prior 10/25/2022.    Left ICD. Normal heart mediastinum. Hyperinflated lungs. Multiple   parenchymal nodules of varying sizes reidentified similar to prior   consistent with metastatic disease. No acute infiltrate pleural effusion   or pneumothorax.    IMPRESSION: No acute infiltrate. pulmonary metastatic nodules similar to   prior    --- End of Report ---    < end of copied text >    Imaging  Reviewed:  YES    Consultant(s) Notes Reviewed:   YES      Plan of care was discussed with patient and /or primary care giver; all questions and concerns were addressed

## 2022-12-08 NOTE — PROGRESS NOTE ADULT - ASSESSMENT
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 Pt also endorsed vaginal bleeding for which she was evaluated by OBGYN at [facility] and was recommended further work up including TVUS but patient refused. Pt has a chronic nephrostomy tube that was placed at [facility] for right hydronephrosis.  Pt last received chemotherapy in may of 2022.  Admitted to medicine for dyspnea likely in setting of Pulm mets, also found to have UTI, abx started, ID Dr. Rodriguez following. Supplemental oxygen tapered off to room air. Nephrostomy tube change with IR on 12/7.

## 2022-12-08 NOTE — PROGRESS NOTE ADULT - PROBLEM SELECTOR PROBLEM 7
[Initial Consultation] : an initial consultation for [Tinnitus] : tinnitus [Vertigo] : vertigo Primary cancer of bladder with metastasis to other site [FreeTextEntry2] : ears, nose and throat

## 2022-12-08 NOTE — CONSULT NOTE ADULT - PROBLEM SELECTOR RECOMMENDATION 5
Pt is very weak. Primarily bedbound.  High risk for skin failure.  Supportive care  OOB to chair as tolerated    Pt eval

## 2022-12-08 NOTE — DISCHARGE NOTE PROVIDER - HOSPITAL COURSE
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 Pt also endorsed vaginal bleeding for which she was evaluated by OBGYN at [facility] and was recommended further work up including TVUS but patient refused. Pt has a chronic nephrostomy tube that was placed at [facility] for right hydronephrosis.  Pt last received chemotherapy in may of 2022.  Admitted to medicine for dyspnea likely in setting of Pulm mets, also found to have UTI, abx started, ID Dr. Rodriguez following. Supplemental oxygen tapered off to room air. Pt s/p  nephrostomy tube change with IR on 12/7.   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 Pt also endorsed vaginal bleeding for which she was evaluated by OBGYN at [facility] and was recommended further work up including TVUS but patient refused. Pt has a chronic nephrostomy tube that was placed at [facility] for right hydronephrosis.  Pt last received chemotherapy in may of 2022.  Admitted to medicine for dyspnea likely in setting of Pulm mets, also found to have UTI, abx started, ID Dr. Rodriguez following. Supplemental oxygen tapered off to room air. Pt s/p  nephrostomy tube change with IR on 12/7.   Patient  completed 7 days of abx as per ID   Clinically improving, optimized for discharge with outpt follow up.   Please note that this a brief summary of hospital course please refer to daily progress notes and consult notes for full course and events.    Given patient's improved clinical status and current hemodynamic stability, decision was made to discharge.  Please refer to patient's complete medical chart with documents for a full hospital course, for this is only a brief summary.   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 Pt also endorsed vaginal bleeding for which she was evaluated by OBGYN at [facility] and was recommended further work up including TVUS but patient refused. Pt has a chronic nephrostomy tube that was placed at [facility] for right hydronephrosis.  Pt last received chemotherapy in may of 2022.  Admitted to medicine for dyspnea likely in setting of Pulm mets, also found to have UTI, abx started, ID Dr. Rodriguez following. Supplemental oxygen tapered off to room air. Pt s/p  nephrostomy tube change with IR on 12/7.   Patient  completed 7 days of abx as per ID   Clinically improving, optimized for discharge with outpt follow up, pt. appealed discharge.  Please note that this a brief summary of hospital course please refer to daily progress notes and consult notes for full course and events.    Given patient's improved clinical status and current hemodynamic stability, decision was made to discharge.  Please refer to patient's complete medical chart with documents for a full hospital course, for this is only a brief summary.

## 2022-12-08 NOTE — CONSULT NOTE ADULT - PROBLEM SELECTOR RECOMMENDATION 2
Pt is tearful, expressed feeling overwhelmed, sad, and poor appetite.  Patient is having a very difficult time with this diagnosis.   -Lexapro 10 mg daily

## 2022-12-08 NOTE — PROGRESS NOTE ADULT - ASSESSMENT
Confidential Drug Utilization Report  Search Terms: Zoë Bell, 1965Search Date: 12/05/2022 09:13:40 AM  The Drug Utilization Report below displays all of the controlled substance prescriptions, if any, that your patient has filled in the last twelve months. The information displayed on this report is compiled from pharmacy submissions to the Department, and accurately reflects the information as submitted by the pharmacies.    This report was requested by: Pat Mora | Reference #: [id number]    You have not added a ABI number. Keeping your ABI number(s) up to date on the My ABI # page will enable the separation of your prescriptions from others in the search results.    Others' Prescriptions  Patient Name: Zoë BellBirth Date: 1965  Address: [address] [place], [place] [address]Wdv: Female  Rx Written	Rx Dispensed	Drug	Quantity	Days Supply	Prescriber Name	Prescriber Abi #	Payment Method	Dispenser  11/26/2022	11/26/2022	morphine sulf 10 mg/5 ml soln	430ml	20	Duke Meng M	[id number]	[id number]  11/15/2022	11/21/2022	morphine sulf 10 mg/5 ml soln	75ml	5	Ash Garcia	[id number]	Medicare	[id number]    Patient Name: Zoë BellBirth Date: 1965  Address: [address] [place], [place] [address]Mll: Female  Rx Written	Rx Dispensed	Drug	Quantity	Days Supply	Prescriber Name	Prescriber Abi #	Payment Method	Dispenser  11/15/2022	11/15/2022	morphine sulf 10 mg/5 ml soln	75ml	5	Ash Garcia	[id number]	[facility]    Patient Name: Zoë BellBirth Date: 1965  Address: [address] [facility] [address] [place], [place] [address]Jwl: Female  Rx Written	Rx Dispensed	Drug	Quantity	Days Supply	Prescriber Name	Prescriber Abi #	Payment Method	Dispenser  10/04/2022	10/15/2022	tramadol hcl 50 mg tablet	10	5	Jai Hughes [id number]	Medicare	[facility] [id number]  08/26/2022	09/01/2022	tramadol hcl 50 mg tablet	20	5	Timi Patel	[id number]	Medicare	[facility] [id number]  08/26/2022	09/01/2022	diazepam 5 mg tablet	15	5	Timi Patel	[id number]	Medicare	[facility] [id number]  * - Drugs marked with an asterisk are compound drugs. If the compound drug is made up of more than one controlled substance, then each controlled substance will be a separate row in the table.

## 2022-12-08 NOTE — CONSULT NOTE ADULT - CONSULT REASON
Enterococcal UTI
Malignancy
low back and abdominal pain
Discuss complex medical decision making related to goals of care metastatic bladder cancer to lungs and liver.

## 2022-12-09 NOTE — CHART NOTE - NSCHARTNOTEFT_GEN_A_CORE
Reassessment & Nutrition consult requested for assessment/eduction on 12/09/22    Patient is a 56y old  Female who presents with a chief complaint of dyspnea (09 Dec 2022 14:08)      Factors impacting intake: [ ] none [ ] nausea  [ ] vomiting [ ] diarrhea [ ] constipation  [ ]chewing problems [ ] swallowing issues  [X] other: Bladder cancer, vaginal bleeding, anemia    Diet Prescription: Diet, Regular:   Supplement Feeding Modality:  Oral  Ensure Clear Cans or Servings Per Day:  1       Frequency:  Three Times a day (12-08-22 @ 23:59)    Intake: Visited patient with sister at bedside, reports of ongoing poor po intake, requesting Ensure Enlive at mealtime, prefers chocolate flavor, intake 0-25% on 12/08, in addition receiving meals from home, consumed  >50%, also seen earlier by Diet Tech for food preferences & updated on nutrition supplement changes. Speech/Swallow team consulted in am & recommendations noted, D/w NP. RD available.     Daily weight: nursing to monitor daily weights   % Weight Change:     Pertinent Medications: MEDICATIONS  (STANDING):  amoxicillin 500 milliGRAM(s) Oral every 8 hours  enoxaparin Injectable 40 milliGRAM(s) SubCutaneous every 24 hours  ferrous    sulfate 325 milliGRAM(s) Oral daily  folic acid 1 milliGRAM(s) Oral daily  lactulose Syrup 15 Gram(s) Oral every other day  lidocaine   4% Patch 1 Patch Transdermal every 24 hours  pantoprazole    Tablet 40 milliGRAM(s) Oral before breakfast  polyethylene glycol 3350 17 Gram(s) Oral daily  senna 2 Tablet(s) Oral at bedtime    MEDICATIONS  (PRN):  acetaminophen     Tablet .. 325 milliGRAM(s) Oral every 4 hours PRN Moderate Pain (4 - 6)  aluminum hydroxide/magnesium hydroxide/simethicone Suspension 30 milliLiter(s) Oral every 4 hours PRN Dyspepsia  bisacodyl Suppository 10 milliGRAM(s) Rectal daily PRN Constipation  diphenhydrAMINE 25 milliGRAM(s) Oral every 4 hours PRN Itching  morphine   Solution 5 milliGRAM(s) Oral every 3 hours PRN Severe Pain (7 - 10)  simethicone 80 milliGRAM(s) Chew every 6 hours PRN Gas    Pertinent Labs: 12-09 Na139 mmol/L Glu 108 mg/dL<H> K+ 3.8 mmol/L Cr  0.94 mg/dL BUN 18 mg/dL 12-07 Alb 2.1 g/dL<L>     CAPILLARY BLOOD GLUCOSE    Skin: Intact    Estimated Needs:   [X ] no change since previous assessment  [ ] recalculated:     Previous Nutrition Diagnosis:   [ ] Inadequate Energy Intake [ ]Inadequate Oral Intake [ ] Excessive Energy Intake   [ ] Underweight [ ] Increased Nutrient Needs [ ] Overweight/Obesity   [ ] Altered GI Function [ ] Unintended Weight Loss [ ] Food & Nutrition Related Knowledge Deficit [Severe] Malnutrition     Nutrition Diagnosis is [X ] ongoing  [ ] resolved [ ] not applicable     Interventions: To meet nutrition needs     Recommend  [ ] Change Diet To:  [X ] Nutrition Supplement: Ensure Enlive 1 serving TID daily   [ ] Nutrition Support  [X] Other: Nursing to continue feeding assistance and encouragement with aspiration precaution     Monitoring and Evaluation:   [X] PO intake [ x ] Tolerance to diet prescription [ x ] weights [ x ] labs[ x ] follow up per protocol  [ ] other:

## 2022-12-09 NOTE — PROGRESS NOTE ADULT - SUBJECTIVE AND OBJECTIVE BOX
NP Note discussed with  Primary Attending    Patient is a 56y old  Female who presents with a chief complaint of dyspnea (09 Dec 2022 08:40)      INTERVAL HPI/OVERNIGHT EVENTS: Patient seen and examined at bedside.    MEDICATIONS  (STANDING):  amoxicillin 500 milliGRAM(s) Oral every 8 hours  enoxaparin Injectable 40 milliGRAM(s) SubCutaneous every 24 hours  ferrous    sulfate 325 milliGRAM(s) Oral daily  folic acid 1 milliGRAM(s) Oral daily  lactulose Syrup 15 Gram(s) Oral every other day  lidocaine   4% Patch 1 Patch Transdermal every 24 hours  pantoprazole    Tablet 40 milliGRAM(s) Oral before breakfast  polyethylene glycol 3350 17 Gram(s) Oral daily  senna 2 Tablet(s) Oral at bedtime    MEDICATIONS  (PRN):  acetaminophen     Tablet .. 325 milliGRAM(s) Oral every 4 hours PRN Moderate Pain (4 - 6)  aluminum hydroxide/magnesium hydroxide/simethicone Suspension 30 milliLiter(s) Oral every 4 hours PRN Dyspepsia  bisacodyl Suppository 10 milliGRAM(s) Rectal daily PRN Constipation  diphenhydrAMINE 25 milliGRAM(s) Oral every 4 hours PRN Itching  morphine   Solution 5 milliGRAM(s) Oral every 3 hours PRN Severe Pain (7 - 10)  simethicone 80 milliGRAM(s) Chew every 6 hours PRN Gas      __________________________________________________  REVIEW OF SYSTEMS:    CONSTITUTIONAL: No fever,   EYES: no acute visual disturbances  NECK: No pain or stiffness  RESPIRATORY: No cough; No shortness of breath  CARDIOVASCULAR: No chest pain, no palpitations  GASTROINTESTINAL: No pain. No nausea or vomiting; No diarrhea   NEUROLOGICAL: No headache or numbness, no tremors  MUSCULOSKELETAL: No joint pain, no muscle pain  GENITOURINARY: no dysuria, no frequency, no hesitancy  PSYCHIATRY: no depression , no anxiety  ALL OTHER  ROS negative        Vital Signs Last 24 Hrs  T(C): 36.9 (09 Dec 2022 13:19), Max: 37 (08 Dec 2022 20:46)  T(F): 98.5 (09 Dec 2022 13:19), Max: 98.6 (08 Dec 2022 20:46)  HR: 83 (09 Dec 2022 13:19) (70 - 83)  BP: 97/55 (09 Dec 2022 13:19) (91/57 - 112/53)  BP(mean): --  RR: 18 (09 Dec 2022 13:19) (17 - 18)  SpO2: 100% (09 Dec 2022 13:19) (100% - 100%)    Parameters below as of 09 Dec 2022 13:19  Patient On (Oxygen Delivery Method): room air        ________________________________________________  PHYSICAL EXAM:  GENERAL: NAD  HEENT: Normocephalic;  conjunctivae and sclerae clear; moist mucous membranes;   NECK : supple  CHEST/LUNG: Clear to auscultation bilaterally with good air entry   HEART: S1 S2  regular; no murmurs, gallops or rubs  ABDOMEN: Soft, Nontender, Nondistended; Bowel sounds present  EXTREMITIES: no cyanosis; no edema; no calf tenderness  SKIN: warm and dry; no rash  NERVOUS SYSTEM:  Awake and alert; Oriented  to place, person and time ; no new deficits    _________________________________________________  LABS:                        9.3    17.67 )-----------( 319      ( 09 Dec 2022 05:32 )             31.1     12-09    139  |  102  |  18  ----------------------------<  108<H>  3.8   |  31  |  0.94    Ca    11.5<H>      09 Dec 2022 05:32    TPro  6.2  /  Alb  x   /  TBili  x   /  DBili  x   /  AST  x   /  ALT  x   /  AlkPhos  x   12-09        CAPILLARY BLOOD GLUCOSE      RADIOLOGY & ADDITIONAL TESTS:  < from: US Duplex Venous Lower Ext Complete, Bilateral (12.04.22 @ 13:23) >  FINDINGS:    RIGHT:  Normal compressibility of the RIGHT common femoral, femoral and popliteal   veins.  Doppler examination shows normal spontaneous and phasic flow.  No RIGHT calf vein thrombosis is detected.    LEFT:  Normal compressibility of the LEFT common femoral, femoral and popliteal   veins.  Doppler examination shows normal spontaneous and phasic flow.  No LEFT calf vein thrombosis is detected.    IMPRESSION:  No evidence of deep venous thrombosis in either lower extremity.          --- End of Report ---    < end of copied text >  < from: Xray Chest 1 View- PORTABLE-Urgent (Xray Chest 1 View- PORTABLE-Urgent .) (12.01.22 @ 18:12) >  INTERPRETATION:  Short of breath.    AP chest. Prior 10/25/2022.    Left ICD. Normal heart mediastinum. Hyperinflated lungs. Multiple   parenchymal nodules of varying sizes reidentified similar to prior   consistent with metastatic disease. No acute infiltrate pleural effusion   or pneumothorax.    IMPRESSION: No acute infiltrate. pulmonary metastatic nodules similar to   prior    --- End of Report ---    < end of copied text >      Imaging  Reviewed:  YES    Consultant(s) Notes Reviewed:   YES      Plan of care was discussed with patient and /or primary care giver; all questions and concerns were addressed

## 2022-12-09 NOTE — PROGRESS NOTE ADULT - ASSESSMENT
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 Patient states that she developed sudden onset dyspnea 2 days ago at rest. She was recently admitted at [facility] in October 2022 for hemoptysis and CT chest at that time showed Numerous bilateral pulmonary metastases; mild interval increase in size of a few of the largest pulmonary metastases compared to 9/20/202. Hospital course at that time was complicated by a drop in Hb for which she received blood transfusion. She also tested positive for COVID 19 but was asymptomatic. Pt has a chronic nephrostomy tube that was placed at [facility] for right hydronephrosis. She follows with Dr Kan at [facility]. Pt last received chemotherapy in may of 2022.  The ID consult requested today, 12/3/22, to assist with evaluation of UTI.    # Recurrent  UTIs- Urine cx form 12/4 has no growth   # Leukocytosis -elevated   # Metastatic Bladder cancer - s/p ? chemo on May, 2022    would recommend:    1. Bowel regimen for constipation  2. Monitor WBC count, stay elevated   3. Pain management as needed  4. Continue oral Amoxicillin until 12/12/22  5. Monitor kidney function     d/w Patient and Covering NP    Attending Attestation:    Spent more than 35 minutes on total encounter, more than 50 % of the visit was spent counseling and/or coordinating care by the Attending physician.

## 2022-12-09 NOTE — PROGRESS NOTE ADULT - ASSESSMENT
_________________________________________________________________________________________  ========>>  M E D I C A L   A T T E N D I N G    F O L L O W  U P  N O T E  <<=========  -----------------------------------------------------------------------------------------------------    - Patient seen and examined by me earlier today.   - In summary,  JEF HOUSE is a 56y year old woman admitted with SOB  - Patient today overall doing ok, comfortable, eating fairly , pain controlled with morphine for the most part      patient had c/o some difficulty swallowing > S/S eval done and patient passed swallow test..  patient lost IV access and did not get IV iron ordered .. patient feels weak     ==================>> REVIEW OF SYSTEM <<=================    GEN: no fever, no chills, weak, as above   RESP: feels SOB, no cough, no sputum   CVS: no chest pain, no palpitations, no edema  GI: no abdominal pain, no nausea, chronic constipation  : no dysuria, no frequency, chronic on and off hematuria,  (chronic intermittent vaginal bleeding due to tumor extension)   Neuro: no headache, no dizziness  Derm : no itching, no rash    ==================>> PHYSICAL EXAM <<=================    GEN: A&O X 3 , NAD , fairly comfortable, calm   HEENT: NCAT, PERRL, MMM, hearing intact  Neck: supple , no JVD appreciated  CVS: S1S2 , regular , No M/R/G appreciated  PULM: CTA B/L,  no W/R/R appreciated  ABD.: soft. non tender, non distended,  bowel sounds present  Extrem: intact pulses , no edema       nephrostomy tube in place and draining   PSYCH : normal mood,  anxious                                 ( Note written / Date of service 12-09-22 )    ==================>> MEDICATIONS <<====================    amoxicillin 500 milliGRAM(s) Oral every 8 hours  enoxaparin Injectable 40 milliGRAM(s) SubCutaneous every 24 hours  ferrous    sulfate 325 milliGRAM(s) Oral daily  folic acid 1 milliGRAM(s) Oral daily  lactulose Syrup 15 Gram(s) Oral every other day  lidocaine   4% Patch 1 Patch Transdermal every 24 hours  pantoprazole    Tablet 40 milliGRAM(s) Oral before breakfast  polyethylene glycol 3350 17 Gram(s) Oral daily  senna 2 Tablet(s) Oral at bedtime    MEDICATIONS  (PRN):  acetaminophen     Tablet .. 325 milliGRAM(s) Oral every 4 hours PRN Moderate Pain (4 - 6)  aluminum hydroxide/magnesium hydroxide/simethicone Suspension 30 milliLiter(s) Oral every 4 hours PRN Dyspepsia  bisacodyl Suppository 10 milliGRAM(s) Rectal daily PRN Constipation  diphenhydrAMINE 25 milliGRAM(s) Oral every 4 hours PRN Itching  morphine   Solution 5 milliGRAM(s) Oral every 3 hours PRN Severe Pain (7 - 10)  simethicone 80 milliGRAM(s) Chew every 6 hours PRN Gas    ___________  Active diet:  Diet, Regular:   Supplement Feeding Modality:  Oral  Ensure Clear Cans or Servings Per Day:  1       Frequency:  Three Times a day  ___________________    ==================>> VITAL SIGNS <<==================    Vital Signs Last 24 HrsT(C): 36.9 (12-09-22 @ 13:19)  T(F): 98.5 (12-09-22 @ 13:19), Max: 98.6 (12-08-22 @ 20:46)  HR: 83 (12-09-22 @ 13:19) (70 - 83)  BP: 97/55 (12-09-22 @ 13:19)  RR: 18 (12-09-22 @ 13:19) (17 - 18)  SpO2: 100% (12-09-22 @ 13:19) (100% - 100%)       ==================>> LAB AND IMAGING <<==================                        9.3    17.67 )-----------( 319      ( 09 Dec 2022 05:32 )             31.1        12-09    139  |  102  |  18  ----------------------------<  108<H>  3.8   |  31  |  0.94    Ca    11.5<H>      09 Dec 2022 05:32    TPro  6.2  /  Alb  x   /  TBili  x   /  DBili  x   /  AST  x   /  ALT  x   /  AlkPhos  x   12-09    WBC count:   17.67 <<== ,  17.21 <<== ,  16.89 <<== ,  12.73 <<==   Hemoglobin:   9.3 <<==,  9.3 <<==,  9.9 <<==,  10.5 <<==  platelets:  319 <==, 311 <==, 348 <==, 379 <==    Creatinine:  0.94  <<==, 0.93  <<==, 0.99  <<==, 0.96  <<==  Sodium:   139  <==, 140  <==, 140  <==, 138  <==       AST:          32 <== , 21 <==      ALT:        27  <== , 20  <==      AP:        220  <=, 218  <=     Bili:        0.4  <=, 0.5  <=    ____________________________    M I C R O B I O L O G Y :    Culture - Blood (collected 04 Dec 2022 12:36)  Source: .Blood Blood  Preliminary Report (05 Dec 2022 19:02):    No growth to date.    Culture - Urine (collected 04 Dec 2022 04:43)  Source: Clean Catch Clean Catch (Midstream)  Final Report (05 Dec 2022 10:24):    <10,000 CFU/mL Normal Urogenital Vandana    ___________________________________________________________________________________  ===============>>  A S S E S S M E N T   A N D   P L A N <<===============  ------------------------------------------------------------------------------------------    · Assessment	  56 year old female with PMHx of Asthma, Anemia, Bladder cancer with metastases to the lungs, Bradycardia, Hypertension, Dyslipidemia, Hydronephrosis, R-nephrostomy tube, Liver cyst, Parathyroid tumor, Chronic pain and 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     Problem/Plan - 1:  ·  Problem: dyspnea.   ·  Plan: Chest xray showing metastatic disease unchanged from prior : most likely cause of dyspnea ( although anemia contributing)   high risk for DVT/PE but with contraindication for AC due to active chronic vaginal bleeding due to cancer   pt on room air, saturating well not tachycardic , clinically comfortable ( on O2 via NC for comfort) , asking to get home portable o2 > to check ambulatory O2 sat   home medication morphine for dyspnea and pain.    Problem/Plan - 2:  ·  Problem: Anemia.   post 2 Units PRBC with good results   f/u CBC post transfusion  hem following       12-02-22 @ 05:45  Iron:     16 ug/dL  Iron %:   9 %  TIBC:     180 ug/dL  iron supplement    *** unclear cause of leukocytosis ... ? reactive    monitor for now     Problem/Plan - 3:  ·  Problem: Vaginal bleeding.   ·  Plan: hx of vaginal bleeding, refused obgyn assessment at [facility]  Hx of bladder cancer likely causing bleeding   pt seen by GYN at [facility]: no intervention possible at this time..   pt will need treatment for cancer     Problem/Plan - 4:  ·  Problem: Hyponatremia. improved  encourage PO intake     Problem/Plan - 5:  ·  Problem: Bladder cancer.   ·  Plan: Hx of Bladder Ca with mets  last chemotherapy in may of 2022.  hem onc following  palliative appreciated   continue current pain mgmt  patient very resistant in trying new medications    **nephrostomy tube overdue for exchange>>> changed by IR     worsened leukocytosis post pricedure / likely reactive ?     ** pt being treated by ID for recurrent UTIS !      monitor WBC    ** hypercalcemia     appreciated Hem onc eval    pt declined treatment but ended up taking the bisphosphonates      monitor    Problem/Plan - 6:  ·  Problem: Prophylactic measure.   ·  Plan: SCD  PPI.  S/S eval appreciated  patient declined taking antiacid meds / famotidine / PPI    Dispo planing home  patient not interested in hospice   supportive care   OP Onc follow up     --------------------------------------------  Case discussed with pt, NP  Education given on findings and plan of care  ___________________________  HLeslee Ochoa D.O.  Pager: [phone]

## 2022-12-09 NOTE — PROGRESS NOTE ADULT - ASSESSMENT
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 Pt also endorsed vaginal bleeding for which she was evaluated by OBGYN at [facility] and was recommended further work up including TVUS but patient refused. Pt has a chronic nephrostomy tube that was placed at [facility] for right hydronephrosis.  Pt last received chemotherapy in may of 2022.  Admitted to medicine for dyspnea likely in setting of Pulm mets, also found to have UTI, abx started, ID Dr. Rodriguez following. Supplemental oxygen tapered off to room air. Nephrostomy tube change with IR on 12/7. Patient complain of difficulty swallowing, speech and swallow therapist evaluated, no swallowing difficulty, however will recommended soft and bite sizes.

## 2022-12-09 NOTE — PROGRESS NOTE ADULT - PROBLEM SELECTOR PLAN 7
-UA positive  - transition to Amoxacillin total of 7 days   -urinary incontinence  -ID Dr. Rodriguez

## 2022-12-09 NOTE — SWALLOW BEDSIDE ASSESSMENT ADULT - SLP PERTINENT HISTORY OF CURRENT PROBLEM
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 As per EMR, patient states that she developed sudden onset dyspnea 2 days ago at rest; endorsed vaginal bleeding. As per EMr, pt recently admitted at [facility] in October 2022 for hemoptysis and CT chest at that time showed Numerous bilateral pulmonary metastases; mild interval increase in size of a few of the largest pulmonary metastases compared to 9/20/202. As per progree report, Pt has a chronic nephrostomy tube that was placed at [facility] for right hydronephrosis & Pt last recieved chemotherapy in may of 2022. As per radiology, CXR (12/1/22) unremarkable for acute infiltrates; pulmonary metastatic nodules similar to prior. Speech & swallow eval ordered for c/o gagging while eating.

## 2022-12-09 NOTE — PROGRESS NOTE ADULT - PROBLEM SELECTOR PLAN 1
-in setting of anemia, pulmonary mets  -pt refuses CTA as she has one functioning kidney and refuses V/Q scan  -supplemental oxygen PRN  -supportive measures  -SPO2 100% RA at rest  -patient complains of SOB at excertion  -f/u SPO2 RA in ambulation

## 2022-12-09 NOTE — PROGRESS NOTE ADULT - ASSESSMENT
· Assessment	  JEF HOUSE is a 56y Female who presents with a chief complaint of dyspnea.    Metastatic Bladder Cancer  - Patient previously followed with [facility] and at [facility]. Now in process of switching to [facility].  - Last chemotherapy was in May 2022.she never had immunotherapy  - Last imaging in October 2022 had shown worsening pulmonary and liver metastases.   - No systemic therapy while inpatient.  - Consult palliative care for goals of care evaluation. Patient reports that she has upcoming appointment with new oncologist on December 16th  - Pain medicine evaluation noted. Optimize pain control.     Anemia- Patient presented with severe anemia; required blood transfusions.  - In the setting of active malignancy. Noted decreased transferrin saturation indicating iron deficiency as well.  - Can administer oral iron.  - Monitor CBC and transfuse to maintain hemoglobin > 7.    Hypercalcemia  - Corrected calcium today around 12.9.   - Continue to monitor. had one dose of pamidronate  - IVF. Obtain PTH and PTHrP.   gentle IVF    Dyspnea  - Patient refused CTA. Ultrasound did not show any DVT.  - On ampicillin/sulbactam.     leukocytosis, NT changed.  on po antibiotics.  no fever, procal is low    Will continue to follow.

## 2022-12-09 NOTE — SWALLOW BEDSIDE ASSESSMENT ADULT - SWALLOW EVAL: DIAGNOSIS
Pt p/w oral & pharyngeal phase WFL. Oral & pharyngeal phase remarkable for prolonged mastication and effortful swallow & multiple swallows w/ PO intake of regular solids. Oral & pharyngeal phase appeared unremarkable; timely & efficient w/ PO intake of puree, soft & bite-sized, and thin liquids. No overt s&s of aspiration/penetration observed at this time.

## 2022-12-09 NOTE — PROGRESS NOTE ADULT - SUBJECTIVE AND OBJECTIVE BOX
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 Patient states that she developed sudden onset dyspnea 2 days ago at rest. The dyspnoea is not alleviated or worsened by any factors. Pt also endorses vaginal bleeding for which she was evaluated by OBGYN at [facility] and was recommended further work up including TVUS but patient refused. Patient denies any chest pain. She was recently admitted at [facility] in October 2022 for hemoptysis and CT chest at that time showed Numerous bilateral pulmonary metastases; mild interval increase in size of a few of the largest pulmonary metastases compared to 9/20/202. Hospital course at that time was complicated by a drop in Hb for which she received blood transfusion. She also tested positive for COVID 19 but was asymptomatic. Pt has a chronic nephrostomy tube that was placed at [facility] for right hydronephrosis. She follows with Dr Kan at [facility]. Pt last recieved chemotherapy in may of 2022. She denies any fevers, chest pain, abd pain.    In ED VS: T 98, Hb 7.8, WBC 15, Na 132   (01 Dec 2022 19:07)     Pt is seen and examined  pt is awake and lying in bed/out of bed to chair  pt seems comfortable and denies any complaints at this time    ROS:  Negative except for:    MEDICATIONS  (STANDING):  amoxicillin 500 milliGRAM(s) Oral every 8 hours  enoxaparin Injectable 40 milliGRAM(s) SubCutaneous every 24 hours  ferrous    sulfate 325 milliGRAM(s) Oral daily  folic acid 1 milliGRAM(s) Oral daily  iron sucrose Injectable 200 milliGRAM(s) IV Push once  lactulose Syrup 15 Gram(s) Oral every other day  lidocaine   4% Patch 1 Patch Transdermal every 24 hours  pantoprazole    Tablet 40 milliGRAM(s) Oral before breakfast  polyethylene glycol 3350 17 Gram(s) Oral daily  senna 2 Tablet(s) Oral at bedtime    MEDICATIONS  (PRN):  acetaminophen     Tablet .. 325 milliGRAM(s) Oral every 4 hours PRN Moderate Pain (4 - 6)  aluminum hydroxide/magnesium hydroxide/simethicone Suspension 30 milliLiter(s) Oral every 4 hours PRN Dyspepsia  bisacodyl Suppository 10 milliGRAM(s) Rectal daily PRN Constipation  diphenhydrAMINE 25 milliGRAM(s) Oral every 4 hours PRN Itching  morphine   Solution 5 milliGRAM(s) Oral every 3 hours PRN Severe Pain (7 - 10)  simethicone 80 milliGRAM(s) Chew every 6 hours PRN Gas      Allergies    No Known Allergies    Intolerances        Vital Signs Last 24 Hrs  T(C): 36.9 (09 Dec 2022 04:50), Max: 37 (08 Dec 2022 20:46)  T(F): 98.4 (09 Dec 2022 04:50), Max: 98.6 (08 Dec 2022 20:46)  HR: 70 (09 Dec 2022 04:50) (70 - 75)  BP: 95/63 (09 Dec 2022 04:50) (91/57 - 105/56)  BP(mean): --  RR: 18 (09 Dec 2022 04:50) (17 - 18)  SpO2: 100% (09 Dec 2022 04:50) (100% - 100%)    Parameters below as of 09 Dec 2022 04:50  Patient On (Oxygen Delivery Method): nasal cannula  O2 Flow (L/min): 1      PHYSICAL EXAM  General: adult in NAD  HEENT: clear oropharynx, anicteric sclera, pink conjunctiva  Neck: supple  CV: normal S1/S2 with no murmur rubs or gallops  Lungs: positive air movement b/l ant lungs,clear to auscultation, no wheezes, no rales  Abdomen: soft non-tender non-distended, no hepatosplenomegaly  Ext: no clubbing cyanosis or edema  Skin: no rashes and no petechiae  Neuro: alert and oriented X 4, no focal deficits  LABS:                          9.3    17.67 )-----------( 319      ( 09 Dec 2022 05:32 )             31.1         Mean Cell Volume : 88.6 fl  Mean Cell Hemoglobin : 26.5 pg  Mean Cell Hemoglobin Concentration : 29.9 gm/dL  Auto Neutrophil # : x  Auto Lymphocyte # : x  Auto Monocyte # : x  Auto Eosinophil # : x  Auto Basophil # : x  Auto Neutrophil % : x  Auto Lymphocyte % : x  Auto Monocyte % : x  Auto Eosinophil % : x  Auto Basophil % : x    Serial CBC  Hematocrit 31.1  Hemoglobin 9.3  Plat 319  RBC 3.51  WBC 17.67  Serial CBC  Hematocrit 30.2  Hemoglobin 9.3  Plat 311  RBC 3.44  WBC 17.21  Serial CBC  Hematocrit 32.8  Hemoglobin 9.9  Plat 348  RBC 3.70  WBC 16.89    12-09    139  |  102  |  18  ----------------------------<  108<H>  3.8   |  31  |  0.94    Ca    11.5<H>      09 Dec 2022 05:32            Reticulocyte Percent: 1.0 % (12-04 @ 12:36)            BLOOD SMEAR INTERPRETATION:       RADIOLOGY & ADDITIONAL STUDIES:

## 2022-12-09 NOTE — PROGRESS NOTE ADULT - PROBLEM SELECTOR PLAN 4
-in the setting of vaginal bleeding secondary to bladder cancer and extensive metastasis   -H/H stable around pt's baseline  -cont ferrous sulfate  -anemia of chronic disease  -enofer held no IV, Hemonc agrees to hold due to elevated Ferritin levels.

## 2022-12-09 NOTE — SWALLOW BEDSIDE ASSESSMENT ADULT - ADDITIONAL RECOMMENDATIONS
Cannot r/o esophageal/GI concerns; Consider consult to GI. Pt also not likely to meet nutritonal/hydration needs w/ PO intake; consult to dietary for f/u w/ considerations to decreased PO intake.

## 2022-12-09 NOTE — PROGRESS NOTE ADULT - PROBLEM SELECTOR PLAN 2
-extensive metastasis   -last chemo 5/2022  -chronic nephrostomy tube that was placed at [facility] for right hydronephrosis  -IR procedure on 12/7 for nephrostomy exchange    -Hem Onc Dr. Recio

## 2022-12-09 NOTE — PROGRESS NOTE ADULT - PROBLEM SELECTOR PLAN 9
-dvt ppx: SCDs  -gi PPX: PPI
-discharge disposition back home pending IR procedure on 12/7
-discharge home pending IR procedure 12/7
-pending PT consult
-discharge disposition back home pending IR procedure today 12/7

## 2022-12-09 NOTE — SWALLOW BEDSIDE ASSESSMENT ADULT - COMMENTS
Pt seen for subjective bedside swallow evaluation. Pt alert & oriented; however, appeared anxious & frustrated AEB crying ("This is all too much"). Pt cachetic; endorsed decreased PO intake. Pt endorsed difficulty swallowing c/b globus sensation, burning in throat; pt reported a h/o GERD & gastritis. PO trials included: puree, soft & bite-sized, regular solids, & thin liquids.

## 2022-12-09 NOTE — PROGRESS NOTE ADULT - SUBJECTIVE AND OBJECTIVE BOX
Patient is seen and examined at the bed side, is afebrile.  The WBC count stay elevated.       REVIEW OF SYSTEMS: All other review systems are negative      ALLERGIES: No Known Allergies      Vital Signs Last 24 Hrs  T(C): 36.9 (09 Dec 2022 13:19), Max: 37 (08 Dec 2022 20:46)  T(F): 98.5 (09 Dec 2022 13:19), Max: 98.6 (08 Dec 2022 20:46)  HR: 83 (09 Dec 2022 13:19) (70 - 83)  BP: 97/55 (09 Dec 2022 13:19) (91/57 - 112/53)  BP(mean): --  RR: 18 (09 Dec 2022 13:19) (17 - 18)  SpO2: 100% (09 Dec 2022 13:19) (100% - 100%)    Parameters below as of 09 Dec 2022 13:19  Patient On (Oxygen Delivery Method): room air        PHYSICAL EXAM:  GENERAL: Not in distress , on oxygen via NC  CHEST/LUNG:  Not using accessory muscles   HEART: s1 and s2 present  ABDOMEN:  Nontender and  Nondistended  EXTREMITIES: No pedal  edema  CNS: Awake and Alert        LABS:                         9.3    17.67 )-----------( 319      ( 09 Dec 2022 05:32 )             31.1                           9.3    17.21 )-----------( 311      ( 08 Dec 2022 06:49 )             30.2           12-09    139  |  102  |  18  ----------------------------<  108<H>  3.8   |  31  |  0.94    Ca    11.5<H>      09 Dec 2022 05:32    TPro  6.2  /  Alb  x   /  TBili  x   /  DBili  x   /  AST  x   /  ALT  x   /  AlkPhos  x   12-09 12-08    140  |  103  |  18  ----------------------------<  94  3.9   |  27  |  0.93    Ca    11.3<H>      08 Dec 2022 06:49    TPro  7.5  /  Alb  2.1<L>  /  TBili  0.4  /  DBili  x   /  AST  32  /  ALT  27  /  AlkPhos  220<H>  12-07    PT/INR - ( 01 Dec 2022 16:25 )   PT: 15.4 sec;   INR: 1.29 ratio      PTT - ( 01 Dec 2022 16:25 )  PTT:24.9 sec        MEDICATIONS  (STANDING):    amoxicillin 500 milliGRAM(s) Oral every 8 hours  enoxaparin Injectable 40 milliGRAM(s) SubCutaneous every 24 hours  ferrous    sulfate 325 milliGRAM(s) Oral daily  folic acid 1 milliGRAM(s) Oral daily  lactulose Syrup 15 Gram(s) Oral every other day  lidocaine   4% Patch 1 Patch Transdermal every 24 hours  pantoprazole    Tablet 40 milliGRAM(s) Oral before breakfast  polyethylene glycol 3350 17 Gram(s) Oral daily  senna 2 Tablet(s) Oral at bedtime      RADIOLOGY & ADDITIONAL TESTS:    12/1/22 : Xray Chest 1 View- PORTABLE-Urgent (Xray Chest 1 View- PORTABLE-Urgent .) (12.01.22 @ 18:12) >    Left ICD. Normal heart mediastinum. Hyperinflated lungs. Multiple parenchymal nodules of varying sizes reidentified similar to prior   consistent with metastatic disease. No acute infiltrate pleural effusion  or pneumothorax.    IMPRESSION: No acute infiltrate. pulmonary metastatic nodules similar to  prior        MICROBIOLOGY DATA:      Culture - Blood (12.04.22 @ 12:36)   Specimen Source: .Blood Blood   Culture Results: No growth to date.     Culture - Urine (12.04.22 @ 04:43)   Specimen Source: Clean Catch Clean Catch (Midstream)   Culture Results:   <10,000 CFU/mL Normal Urogenital Vandana     Urine Microscopic-Add On (NC) (12.04.22 @ 04:43)   Red Blood Cell - Urine: >50 /HPF   White Blood Cell - Urine: >50 /HPF   Bacteria: Many /HPF   Epithelial Cells: Few /HPF       COVID-19 PCR (12.01.22 @ 16:25)   COVID-19 PCR: NotDetec:

## 2022-12-10 NOTE — PROGRESS NOTE ADULT - SUBJECTIVE AND OBJECTIVE BOX
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 Patient states that she developed sudden onset dyspnea 2 days ago at rest. The dyspnoea is not alleviated or worsened by any factors. Pt also endorses vaginal bleeding for which she was evaluated by OBGYN at [facility] and was recommended further work up including TVUS but patient refused. Patient denies any chest pain. She was recently admitted at [facility] in October 2022 for hemoptysis and CT chest at that time showed Numerous bilateral pulmonary metastases; mild interval increase in size of a few of the largest pulmonary metastases compared to 9/20/202. Hospital course at that time was complicated by a drop in Hb for which she received blood transfusion. She also tested positive for COVID 19 but was asymptomatic. Pt has a chronic nephrostomy tube that was placed at [facility] for right hydronephrosis. She follows with Dr Kan at [facility]. Pt last recieved chemotherapy in may of 2022. She denies any fevers, chest pain, abd pain.    In ED VS: T 98, Hb 7.8, WBC 15, Na 132   (01 Dec 2022 19:07)    Dopplers- No evidence of deep venous thrombosis in either lower extremity.    Pt is admitted for SOB, anemia. Received 2 units PRBC on 12/1 and 12/2. Pt with metastatic bladder CA to lungs, on chronic opioids. + right nephrostomy (nephrostomy tube changed on 12/7). On morphine 5mg PO solution at home q4h PRN per istop. Pt seen and examined at bedside this morning. Sitting in bed, crying, reports lumbar back and b/l flank pain score 10/10 and not tolerable. SCALE USED: (1-10 VNRS). Pt recently received PRN pain medications, which helps her pain. Pt describes pain as constant, throbbing, radiating throughout lumbar back, alleviated by pain medication, exacerbated by movement and palpation. Pt states she occasionally take morphine 6mg PO PRN if needed for pain. Denies abdominal pain at this time. Reports occasional gas pain for which she takes Gas-X at home. Pt tolerating PO diet, however has poor appetite and weight loss. Reports SOB on exertion, and per pt will need to be discharged on O2 at home. Reports lethargy, constipation and vaginal bleeding. Last BM 12/9. Denies chest pain, nausea, vomiting. Patient stated goal for pain control: to be able to take deep breaths, get out of bed to chair and ambulate with tolerable pain control. PT ambulating to the bathroom with tolerable pain. Per pt, plan for oncology followup outpatient, has an appointment on 12/23.     PAST MEDICAL & SURGICAL HISTORY:  Anemia      Asthma      HLD (hyperlipidemia)      Pacemaker  [employer]      Bladder cancer      HTN (hypertension)      Parathyroid tumor      Liver cyst      Hydronephrosis  has right Nephrostomy tube - dressing intact      Bradycardia      History of renal calculi      Birthmark      H/O myomectomy      Presence of cardiac pacemaker      H/O hydronephrosis  s/p Right Perc nephrostomy tube placement 02/2021, exchange 06/2021          FAMILY HISTORY:  Family history of prostate cancer (Father)    Family history of CHF (congestive heart failure) (Father)    Family history of atrial fibrillation (Father)        Social History:  Denies any smoking, etoh , or drug use.o (01 Dec 2022 19:07)    Allergies    No Known Allergies    MEDICATIONS  (STANDING):  amoxicillin 500 milliGRAM(s) Oral every 8 hours  enoxaparin Injectable 40 milliGRAM(s) SubCutaneous every 24 hours  ferrous    sulfate 325 milliGRAM(s) Oral daily  folic acid 1 milliGRAM(s) Oral daily  lactulose Syrup 15 Gram(s) Oral every other day  lidocaine   4% Patch 1 Patch Transdermal every 24 hours  pantoprazole    Tablet 40 milliGRAM(s) Oral before breakfast  polyethylene glycol 3350 17 Gram(s) Oral daily  senna 2 Tablet(s) Oral at bedtime    MEDICATIONS  (PRN):  acetaminophen     Tablet .. 325 milliGRAM(s) Oral every 4 hours PRN Moderate Pain (4 - 6)  aluminum hydroxide/magnesium hydroxide/simethicone Suspension 30 milliLiter(s) Oral every 4 hours PRN Dyspepsia  bisacodyl Suppository 10 milliGRAM(s) Rectal daily PRN Constipation  diphenhydrAMINE 25 milliGRAM(s) Oral every 4 hours PRN Itching  morphine   Solution 5 milliGRAM(s) Oral every 3 hours PRN Severe Pain (7 - 10)  simethicone 80 milliGRAM(s) Chew every 6 hours PRN Gas      Vital Signs Last 24 Hrs  T(C): 36.8 (10 Dec 2022 13:43), Max: 37.2 (09 Dec 2022 20:24)  T(F): 98.2 (10 Dec 2022 13:43), Max: 99 (09 Dec 2022 20:24)  HR: 81 (10 Dec 2022 13:43) (74 - 81)  BP: 88/41 (10 Dec 2022 13:43) (88/41 - 102/57)  BP(mean): --  RR: 18 (10 Dec 2022 13:43) (18 - 18)  SpO2: 100% (10 Dec 2022 13:43) (100% - 100%)    Parameters below as of 10 Dec 2022 13:43  Patient On (Oxygen Delivery Method): nasal cannula  O2 Flow (L/min): 2    LABS: Reviewed                          9.3    15.81 )-----------( 318      ( 10 Dec 2022 05:25 )             30.7     12-10    136  |  101  |  20<H>  ----------------------------<  115<H>  3.6   |  28  |  0.88    Ca    11.0<H>      10 Dec 2022 05:25    TPro  6.2  /  Alb  x   /  TBili  x   /  DBili  x   /  AST  x   /  ALT  x   /  AlkPhos  x   12-09      LIVER FUNCTIONS - ( 09 Dec 2022 05:32 )  Alb: x     / Pro: 6.2 g/dL / ALK PHOS: x     / ALT: x     / AST: x     / GGT: x             CAPILLARY BLOOD GLUCOSE    COVID-19 PCR: NotDetec (07 Dec 2022 05:43)  COVID-19 PCR: NotDetec (01 Dec 2022 16:25)  COVID-19 PCR: NotDetec (13 Nov 2022 05:42)  COVID-19 PCR: Detected (05 Nov 2022 05:25)    Radiology: Reviewed.   < from: US Duplex Venous Lower Ext Complete, Bilateral (12.04.22 @ 13:23) >    ACC: [id number] EXAM:  US DPLX LWR EXT VEINS COMPL BI                          PROCEDURE DATE:  12/04/2022          INTERPRETATION:  CLINICAL INFORMATION: Bilateral leg pain.    COMPARISON: None available.    TECHNIQUE: Duplex sonography of the BILATERAL LOWER extremity veins with   color and spectral Doppler, with and without compression.    FINDINGS:    RIGHT:  Normal compressibility of the RIGHT common femoral, femoral and popliteal   veins.  Doppler examination shows normal spontaneous and phasic flow.  No RIGHT calf vein thrombosis is detected.    LEFT:  Normal compressibility of the LEFT common femoral, femoral and popliteal   veins.  Doppler examination shows normal spontaneous and phasic flow.  No LEFT calf vein thrombosis is detected.    IMPRESSION:  No evidence of deep venous thrombosis in either lower extremity.          --- End of Report ---            LAUREN GUERRERO MD; Attending Radiologist  This document has been electronically signed. Dec  4 2022  1:28PM    < end of copied text >      ORT Score -   Family Hx of substance abuse	Female	      Male  Alcohol 	                                           1                     3  Illegal drugs	                                   2                     3  Rx drugs                                           4 	                  4  Personal Hx of substance abuse		  Alcohol 	                                          3	                  3  Illegal drugs                                     4	                  4  Rx drugs                                            5 	                  5  Age between 16- 45 years	           1                     1  hx preadolescent sexual abuse	   3 	                  0  Psychological disease		  ADD, OCD, bipolar, schizophrenia   2	          2  Depression                                           1 	          1  Total: 0    a score of 3 or lower indicates low risk for opioid abuse		  a score of 4-7 indicates moderate risk for opioid abuse		  a score of 8 or higher indicates high risk for opioid abuse    REVIEW OF SYSTEMS:  CONSTITUTIONAL: No fever + fatigue  HEENT:  No difficulty hearing, no change in vision  NECK: No pain or stiffness  RESPIRATORY: No cough, wheezing, chills or hemoptysis; + occasional shortness of breath on exertion   CARDIOVASCULAR: No chest pain, palpitations, dizziness, or leg swelling + left chest wall pacemaker  GASTROINTESTINAL: + loss of appetite, decreased PO intake. + weight loss; + occasional abdominal pain. + bloating; No nausea, vomiting; No diarrhea + constipation.   GENITOURINARY: No dysuria, frequency, hematuria, retention or incontinence + solitary right kidney, with nephrostomy   GYN: + vaginal bleeding  MUSCULOSKELETAL: + chronic low back and flank pain; No joint swelling; + generalized upper and lower motor strength weakness, no saddle anesthesia, bowel/bladder incontinence, no falls   NEURO: No headaches, No numbness/tingling b/l LE, No weakness    PHYSICAL EXAM:  GENERAL:  + fatigued & Oriented X4, cooperative, NAD, Good concentration. Speech is clear. + cachectic   RESPIRATORY: Respirations even and unlabored. Clear to auscultation bilaterally; No rales, rhonchi, wheezing, or rubs + O2 2L NC   CARDIOVASCULAR: Normal S1/S2, regular rate and rhythm; No murmurs, rubs, or gallops. No JVD. + left chest wall pacemaker   GASTROINTESTINAL:  Soft, Nontender, Nondistended; Bowel sounds present  GENITOURINARY: right flank nephrostomy tube draining clear yellow urine, dressing c/d/i   PERIPHERAL VASCULAR:  Extremities warm without edema. 2+ Peripheral Pulses, No cyanosis, No calf tenderness  MUSCULOSKELETAL: Motor Strength 4/5 B/L upper and lower extremities; moves all extremities equally against gravity; ROM intact; + lumbar back and flank tenderness on palpation. No paraspinal tenderness.   SKIN: Warm, dry, intact. No rashes, lesions, scars or wounds.     Risk factors associated with adverse outcomes related to opioid treatment  [ ]  Concurrent benzodiazepine use  [ ]  History/ Active substance use or alcohol use disorder  [ ] Psychiatric co-morbidity  [ ] Sleep apnea  [ ] COPD  [ ] BMI> 35  [ ] Liver dysfunction  [X ] Renal dysfunction  [ ] CHF  [ ] Smoker  [ ]  Age > 60 years    [X ]  [place]  Reviewed and Copied to Chart. See below.    Plan of care and goal oriented pain management treatment options were discussed with patient and /or primary care giver; all questions and concerns were addressed and care was aligned with patient's wishes.    Educated patient on goal oriented pain management treatment options     12-10-22 @ 14:29

## 2022-12-10 NOTE — PROGRESS NOTE ADULT - SUBJECTIVE AND OBJECTIVE BOX
SUBJECTIVE / OVERNIGHT EVENTS:pt seen and examined, c/o pain  12-10-22    MEDICATIONS  (STANDING):  amoxicillin 500 milliGRAM(s) Oral every 8 hours  enoxaparin Injectable 40 milliGRAM(s) SubCutaneous every 24 hours  ferrous    sulfate 325 milliGRAM(s) Oral daily  folic acid 1 milliGRAM(s) Oral daily  lactulose Syrup 15 Gram(s) Oral every other day  lidocaine   4% Patch 1 Patch Transdermal every 24 hours  pantoprazole    Tablet 40 milliGRAM(s) Oral before breakfast  polyethylene glycol 3350 17 Gram(s) Oral daily  senna 2 Tablet(s) Oral at bedtime    MEDICATIONS  (PRN):  acetaminophen     Tablet .. 325 milliGRAM(s) Oral every 4 hours PRN Moderate Pain (4 - 6)  aluminum hydroxide/magnesium hydroxide/simethicone Suspension 30 milliLiter(s) Oral every 4 hours PRN Dyspepsia  bisacodyl Suppository 10 milliGRAM(s) Rectal daily PRN Constipation  diphenhydrAMINE 25 milliGRAM(s) Oral every 4 hours PRN Itching  morphine   Solution 5 milliGRAM(s) Oral every 3 hours PRN Severe Pain (7 - 10)  simethicone 80 milliGRAM(s) Chew every 6 hours PRN Gas    T(C): 36.8 (12-10-22 @ 13:43), Max: 37.2 (12-09-22 @ 20:24)  HR: 72 (12-10-22 @ 15:42) (72 - 81)  BP: 100/51 (12-10-22 @ 15:42) (88/41 - 102/57)  RR: 18 (12-10-22 @ 13:43) (18 - 18)  SpO2: 100% (12-10-22 @ 13:43) (100% - 100%)    CAPILLARY BLOOD GLUCOSE        I&O's Summary    09 Dec 2022 07:01  -  10 Dec 2022 07:00  --------------------------------------------------------  IN: 0 mL / OUT: 400 mL / NET: -400 mL        Constitutional: No fever, fatigue  Skin: No rash.  Eyes: No recent vision problems or eye pain.  ENT: No congestion, ear pain, or sore throat.  Cardiovascular: No chest pain or palpation.  Respiratory: No cough, shortness of breath, congestion, or wheezing.  Gastrointestinal: No abdominal pain, nausea, vomiting, or diarrhea.  Genitourinary: No dysuria.  Musculoskeletal: No joint swelling.  Neurologic: No headache.    PHYSICAL EXAM:  GENERAL: NAD  EYES: EOMI, PERRLA  NECK: Supple, No JVD  CHEST/LUNG: dec breath sounds at bases  HEART:  S1 , S2 +  ABDOMEN: soft, bs+, nephrostomy bag+  EXTREMITIES:  no edema  NEUROLOGY:alert awake  oriented      LABS:                        9.3    15.81 )-----------( 318      ( 10 Dec 2022 05:25 )             30.7     12-10    136  |  101  |  20<H>  ----------------------------<  115<H>  3.6   |  28  |  0.88    Ca    11.0<H>      10 Dec 2022 05:25    TPro  6.2  /  Alb  x   /  TBili  x   /  DBili  x   /  AST  x   /  ALT  x   /  AlkPhos  x   12-09              RADIOLOGY & ADDITIONAL TESTS:    Imaging Personally Reviewed:    Consultant(s) Notes Reviewed:      Care Discussed with Consultants/Other Providers:

## 2022-12-10 NOTE — PROGRESS NOTE ADULT - ASSESSMENT
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 Patient states that she developed sudden onset dyspnea 2 days ago at rest. She was recently admitted at [facility] in October 2022 for hemoptysis and CT chest at that time showed Numerous bilateral pulmonary metastases; mild interval increase in size of a few of the largest pulmonary metastases compared to 9/20/202. Hospital course at that time was complicated by a drop in Hb for which she received blood transfusion. She also tested positive for COVID 19 but was asymptomatic. Pt has a chronic nephrostomy tube that was placed at [facility] for right hydronephrosis. She follows with Dr Kan at [facility]. Pt last received chemotherapy in may of 2022.  The ID consult requested today, 12/3/22, to assist with evaluation of UTI.    # Recurrent  UTIs- Urine cx form 12/4 has no growth   # Leukocytosis -started to resolve  # Metastatic Bladder cancer - s/p  chemo on May, 2022    would recommend:    1. Adjust Protonix doses since once daily is not helping much  2. Bowel regimen for constipation  3. Monitor WBC count, started trending down  4. Pain management as needed  5. Continue oral Amoxicillin until 12/12/22  x 2 more days  6. Monitor kidney function     d/w Patient and Nursing  staff    Attending Attestation:    Spent more than 35 minutes on total encounter, more than 50 % of the visit was spent counseling and/or coordinating care by the Attending physician.

## 2022-12-10 NOTE — PROGRESS NOTE ADULT - SUBJECTIVE AND OBJECTIVE BOX
Patient is seen and examined at the bed side, is afebrile.  She is c/o dyspepsia. The Leukocytosis started trending down.      REVIEW OF SYSTEMS: All other review systems are negative      ALLERGIES: No Known Allergies      Vital Signs Last 24 Hrs  T(C): 36.8 (10 Dec 2022 13:43), Max: 36.8 (10 Dec 2022 13:43)  T(F): 98.2 (10 Dec 2022 13:43), Max: 98.2 (10 Dec 2022 13:43)  HR: 72 (10 Dec 2022 15:42) (72 - 81)  BP: 100/51 (10 Dec 2022 15:42) (88/41 - 100/51)  BP(mean): --  RR: 18 (10 Dec 2022 13:43) (18 - 18)  SpO2: 100% (10 Dec 2022 13:43) (100% - 100%)    Parameters below as of 10 Dec 2022 13:43  Patient On (Oxygen Delivery Method): nasal cannula  O2 Flow (L/min): 2        PHYSICAL EXAM:  GENERAL: Appears ill , on oxygen via NC  CHEST/LUNG:  Not using accessory muscles   HEART: s1 and s2 present  ABDOMEN:  Nontender and  Nondistended  EXTREMITIES: No pedal  edema  CNS: Awake and Alert        LABS:                         9.3    15.81 )-----------( 318      ( 10 Dec 2022 05:25 )             30.7                           9.3    17.67 )-----------( 319      ( 09 Dec 2022 05:32 )             31.1           12-10    136  |  101  |  20<H>  ----------------------------<  115<H>  3.6   |  28  |  0.88    Ca    11.0<H>      10 Dec 2022 05:25    TPro  6.2  /  Alb  x   /  TBili  x   /  DBili  x   /  AST  x   /  ALT  x   /  AlkPhos  x   12-09 12-09    139  |  102  |  18  ----------------------------<  108<H>  3.8   |  31  |  0.94    Ca    11.5<H>      09 Dec 2022 05:32    TPro  6.2  /  Alb  x   /  TBili  x   /  DBili  x   /  AST  x   /  ALT  x   /  AlkPhos  x   12-09    PT/INR - ( 01 Dec 2022 16:25 )   PT: 15.4 sec;   INR: 1.29 ratio      PTT - ( 01 Dec 2022 16:25 )  PTT:24.9 sec        MEDICATIONS  (STANDING):    amoxicillin 500 milliGRAM(s) Oral every 8 hours  enoxaparin Injectable 40 milliGRAM(s) SubCutaneous every 24 hours  ferrous    sulfate 325 milliGRAM(s) Oral daily  folic acid 1 milliGRAM(s) Oral daily  lactulose Syrup 15 Gram(s) Oral every other day  lidocaine   4% Patch 1 Patch Transdermal every 24 hours  pantoprazole    Tablet 40 milliGRAM(s) Oral every 12 hours  polyethylene glycol 3350 17 Gram(s) Oral daily  senna 2 Tablet(s) Oral at bedtime      RADIOLOGY & ADDITIONAL TESTS:    12/1/22 : Xray Chest 1 View- PORTABLE-Urgent (Xray Chest 1 View- PORTABLE-Urgent .) (12.01.22 @ 18:12) >    Left ICD. Normal heart mediastinum. Hyperinflated lungs. Multiple parenchymal nodules of varying sizes reidentified similar to prior   consistent with metastatic disease. No acute infiltrate pleural effusion  or pneumothorax.    IMPRESSION: No acute infiltrate. pulmonary metastatic nodules similar to  prior        MICROBIOLOGY DATA:    Culture - Blood (12.04.22 @ 12:36)   Specimen Source: .Blood Blood   Culture Results: No growth to date.     Culture - Urine (12.04.22 @ 04:43)   Specimen Source: Clean Catch Clean Catch (Midstream)   Culture Results: <10,000 CFU/mL Normal Urogenital Vandana     Urine Microscopic-Add On (NC) (12.04.22 @ 04:43)   Red Blood Cell - Urine: >50 /HPF   White Blood Cell - Urine: >50 /HPF   Bacteria: Many /HPF   Epithelial Cells: Few /HPF       COVID-19 PCR (12.01.22 @ 16:25)   COVID-19 PCR: NotDetec:

## 2022-12-10 NOTE — PROGRESS NOTE ADULT - ASSESSMENT
Confidential Drug Utilization Report  Search Terms: Zoë Bell, 1965Search Date: 12/05/2022 09:13:40 AM  The Drug Utilization Report below displays all of the controlled substance prescriptions, if any, that your patient has filled in the last twelve months. The information displayed on this report is compiled from pharmacy submissions to the Department, and accurately reflects the information as submitted by the pharmacies.    This report was requested by: Pat Mora | Reference #: [id number]    You have not added a ABI number. Keeping your ABI number(s) up to date on the My ABI # page will enable the separation of your prescriptions from others in the search results.    Others' Prescriptions  Patient Name: Zoë BellBirth Date: 1965  Address: [address] [place], [place] [address]Hvi: Female  Rx Written	Rx Dispensed	Drug	Quantity	Days Supply	Prescriber Name	Prescriber Abi #	Payment Method	Dispenser  11/26/2022	11/26/2022	morphine sulf 10 mg/5 ml soln	430ml	20	Duke Meng M	[id number]	[id number]  11/15/2022	11/21/2022	morphine sulf 10 mg/5 ml soln	75ml	5	Ash Garcia	[id number]	Medicare	[id number]    Patient Name: Zoë BellBirth Date: 1965  Address: [address] [place], [place] [address]Lro: Female  Rx Written	Rx Dispensed	Drug	Quantity	Days Supply	Prescriber Name	Prescriber Abi #	Payment Method	Dispenser  11/15/2022	11/15/2022	morphine sulf 10 mg/5 ml soln	75ml	5	Ash Garcia	[id number]	[facility]    Patient Name: Zoë BellBirth Date: 1965  Address: [address] [facility] [address] [place], [place] [address]Wgd: Female  Rx Written	Rx Dispensed	Drug	Quantity	Days Supply	Prescriber Name	Prescriber Abi #	Payment Method	Dispenser  10/04/2022	10/15/2022	tramadol hcl 50 mg tablet	10	5	Jai Hugehs [id number]	Medicare	[facility] [id number]  08/26/2022	09/01/2022	tramadol hcl 50 mg tablet	20	5	Timi Patel	[id number]	Medicare	[facility] [id number]  08/26/2022	09/01/2022	diazepam 5 mg tablet	15	5	Timi Patel	[id number]	Medicare	[facility] [id number]  * - Drugs marked with an asterisk are compound drugs. If the compound drug is made up of more than one controlled substance, then each controlled substance will be a separate row in the table.

## 2022-12-10 NOTE — PROGRESS NOTE ADULT - ASSESSMENT
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     Dyspnea.   ·  Plan: Chest xray showing metastatic disease unchanged from prior : most likely cause of dyspnea ( although anemia contributing)   high risk for DVT/PE but with contraindication for AC due to active chronic vaginal bleeding due to cancer   pt on room air, saturating well not tachycardic , clinically comfortable ( on O2 via NC for comfort) , asking to get home portable o2 > to check ambulatory O2 sat   home medication morphine for dyspnea and pain.     Anemia  - s/p prbc , hb >9       Vaginal bleeding.   ·  Plan- hx of vaginal bleeding, refused obgyn assessment at [facility]  Hx of bladder cancer likely causing bleeding   pt seen by GYN at [facility]: no intervention possible at this time..     - Hyponatremia. improved  encourage PO intake     - Bladder cancer.   ·  Plan: Hx of Bladder Ca with mets  last chemotherapy in may of 2022.  hem onc following  palliative appreciated   continue current pain mgmt  patient very resistant in trying new medications    **nephrostomy tube overdue for exchange>>> changed by IR     worsened leukocytosis post pricedure / likely reactive ?     ** pt being treated by ID for recurrent UTIS !      monitor WBC - ELEVATED - PT Afebrile at present    ** hypercalcemia     appreciated Hem onc eval    pt declined treatment but ended up taking the bisphosphonates      monitor      ·  Problem: Prophylactic measure.   ·  Plan: SCD  PPI.  S/S eval appreciated  patient declined taking antiacid meds / famotidine / PPI    Dispo planing home  ----

## 2022-12-10 NOTE — PROGRESS NOTE ADULT - PROBLEM SELECTOR PLAN 1
Pt with chronic abdominal and b/l flank pain which is somatic and neuropathic in nature due to metastatic bladder CA to lungs and right nephrostomy tube (changed on 12/7). + opioid dependence. + occasional gas discomfort. + constipation + occasional vaginal bleeding  Opioid pain recommendations   - Continue Morphine sulf solution 5mg PO q3h PRN severe pain. Monitor for respiratory depression/sedation.    Non-opioid pain recommendations   - Avoid NSIADs- pt with solitary kidney  - Continue Acetaminophen 325 mg PO q 4 hours PRN moderate pain. Monitor LFT (dose per pt request)  - Continue Simethicone 80mg PO q6h PRN gas pain.  - Continue lidoderm 4% patch daily.   Bowel Regimen  - Continue Miralax 17G PO daily  - Continue lactulose 15G PO every other day per primary team  - Continue dulcolax 10mg supp PRN constipation   Mild pain   - Non-pharmacological pain treatment recommendations  - Warm/ Cool packs PRN   - Repositioning, imagery, relaxation, distraction.  - Physical therapy OOB if no contraindications   Recommendations discussed with primary team and RN.

## 2022-12-11 NOTE — PROGRESS NOTE ADULT - ASSESSMENT
_________________________________________________________________________________________  ========>>  M E D I C A L   A T T E N D I N G    F O L L O W  U P  N O T E  <<=========  -----------------------------------------------------------------------------------------------------    - Patient seen and examined by me earlier today.   - In summary,  JEF HOUSE is a 56y year old woman admitted with SOB  - Patient today overall doing ok, comfortable, eating fairly , pain controlled with morphine for the most part      patient insisting she needs O2 for home because feels SOB.. but so far not qualifying as O2 sat nearly 100% on room air ( explained to patient )     ==================>> REVIEW OF SYSTEM <<=================    GEN: no fever, no chills, weak, as above   RESP: feels SOB, no cough, no sputum   CVS: no chest pain, no palpitations, mild ankle edema  GI: no abdominal pain, no nausea, chronic constipation: improved   : no dysuria, no frequency, chronic on and off hematuria,  (chronic intermittent vaginal bleeding due to tumor extension)   Neuro: no headache, no dizziness  Derm : no itching, no rash    ==================>> PHYSICAL EXAM <<=================    GEN: A&O X 3 , NAD , fairly comfortable, calm in bed  HEENT: NCAT, PERRL, MMM, hearing intact  Neck: supple , no JVD appreciated  CVS: S1S2 , regular , No M/R/G appreciated  PULM: CTA B/L,  no W/R/R appreciated  ABD.: soft. non tender, non distended,  bowel sounds present  Extrem: intact pulses , no edema       nephrostomy tube in place and draining clear yellow urine   PSYCH : normal mood,  anxious                             ( Note written / Date of service 12-11-22 )    ==================>> MEDICATIONS <<====================    amoxicillin 500 milliGRAM(s) Oral every 8 hours  enoxaparin Injectable 40 milliGRAM(s) SubCutaneous every 24 hours  ferrous    sulfate 325 milliGRAM(s) Oral daily  folic acid 1 milliGRAM(s) Oral daily  lactulose Syrup 15 Gram(s) Oral every other day  lidocaine   4% Patch 1 Patch Transdermal every 24 hours  pantoprazole    Tablet 40 milliGRAM(s) Oral every 12 hours  polyethylene glycol 3350 17 Gram(s) Oral daily  senna 2 Tablet(s) Oral at bedtime    MEDICATIONS  (PRN):  acetaminophen     Tablet .. 325 milliGRAM(s) Oral every 4 hours PRN Moderate Pain (4 - 6)  aluminum hydroxide/magnesium hydroxide/simethicone Suspension 30 milliLiter(s) Oral every 4 hours PRN Dyspepsia  bisacodyl Suppository 10 milliGRAM(s) Rectal daily PRN Constipation  diphenhydrAMINE 25 milliGRAM(s) Oral every 4 hours PRN Itching  morphine   Solution 5 milliGRAM(s) Oral every 3 hours PRN Severe Pain (7 - 10)  simethicone 80 milliGRAM(s) Chew every 6 hours PRN Gas    ___________  Active diet:  Diet, Soft and Bite Sized  ___________________    ==================>> VITAL SIGNS <<==================    Vital Signs Last 24 HrsT(C): 36.5 (12-11-22 @ 13:41)  T(F): 97.7 (12-11-22 @ 13:41), Max: 98.6 (12-11-22 @ 02:33)  HR: 81 (12-11-22 @ 13:41) (62 - 81)  BP: 94/56 (12-11-22 @ 13:41)  RR: 17 (12-11-22 @ 13:41) (17 - 18)  SpO2: 100% (12-11-22 @ 13:41) (100% - 100%)       ==================>> LAB AND IMAGING <<==================                        9.5    13.47 )-----------( 364      ( 11 Dec 2022 14:11 )             31.0        12-11    135  |  99  |  16  ----------------------------<  96  3.9   |  27  |  0.79    Ca    11.1<H>      11 Dec 2022 05:32      WBC count:   13.47 <<== ,  12.90 <<== ,  15.81 <<== ,  17.67 <<== ,  17.21 <<== ,  16.89 <<==   Hemoglobin:   9.5 <<==,  8.8 <<==,  9.3 <<==,  9.3 <<==,  9.3 <<==,  9.9 <<==  platelets:  364 <==, 334 <==, 318 <==, 319 <==, 311 <==, 348 <==    Creatinine:  0.79  <<==, 0.88  <<==, 0.94  <<==, 0.93  <<==, 0.99  <<==  Sodium:   135  <==, 136  <==, 139  <==, 140  <==, 140  <==    ____________________________    M I C R O B I O L O G Y :    Culture - Blood (collected 04 Dec 2022 12:36)  Source: .Blood Blood  Final Report (09 Dec 2022 19:00):    No Growth Final    Culture - Urine (collected 04 Dec 2022 04:43)  Source: Clean Catch Clean Catch (Midstream)  Final Report (05 Dec 2022 10:24):    <10,000 CFU/mL Normal Urogenital Vandana    COVID-19 PCR: NotDetec (12-07-22 @ 05:43)  COVID-19 PCR: NotDetec (12-01-22 @ 16:25)      ___________________________________________________________________________________  ===============>>  A S S E S S M E N T   A N D   P L A N <<===============  ------------------------------------------------------------------------------------------    · Assessment	  56 year old female with PMHx of Asthma, Anemia, Bladder cancer with metastases to the lungs, Bradycardia, Hypertension, Dyslipidemia, Hydronephrosis, R-nephrostomy tube, Liver cyst, Parathyroid tumor, Chronic pain and 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     Problem/Plan - 1:  ·  Problem: dyspnea.   ·  Plan: Chest xray showing metastatic disease unchanged from prior : most likely cause of dyspnea ( although anemia contributing)   high risk for DVT/PE but with contraindication for AC due to active chronic vaginal bleeding due to cancer and patient has been saturating well and not tachycardic   home medication morphine for dyspnea and pain.  as above asking for home O2 and trying to set up on her own , stating she was approved for it before !!     Problem/Plan - 2:  ·  Problem: Anemia.   post 2 Units PRBC with good results   f/u CBC post transfusion  hem following       12-02-22 @ 05:45  Iron:     16 ug/dL  Iron %:   9 %  TIBC:     180 ug/dL  iron supplement     *** unclear cause of leukocytosis ... ? reactive    monitor for now     Problem/Plan - 3:  ·  Problem: Vaginal bleeding.   ·  Plan: hx of vaginal bleeding, declined obgyn assessment at [facility]  Hx of bladder cancer likely causing bleeding   pt seen by GYN at [facility]: no intervention possible at this time..   pt will need treatment for cancer     Problem/Plan - 4:  ·  Problem: Hyponatremia. improved  encourage PO intake     Problem/Plan - 5:  ·  Problem: Bladder cancer.   ·  Plan: Hx of Bladder Ca with mets  last chemotherapy in may of 2022.  hem onc following  palliative appreciated   continue current pain mgmt  patient very resistant in trying new medications    **nephrostomy tube overdue for exchange>>> changed by IR     worsened leukocytosis post pricedure / likely reactive ?     ** pt being treated by ID for recurrent UTIS !      monitor WBC    ** hypercalcemia     appreciated Hem onc eval    pt declined treatment but ended up taking the bisphosphonates      monitor    Problem/Plan - 6:  ·  Problem: Prophylactic measure.   ·  Plan: SCD  PPI.  S/S eval appreciated  patient declined taking antiacid meds / famotidine / PPI    Dispo planing home  patient not interested in hospice   supportive care   OP Onc follow up   patient medically stable for DC     --------------------------------------------  Case discussed with pt,   Education given on findings and plan of care  ___________________________  HLeslee Ochoa D.O.  Pager: [phone]

## 2022-12-11 NOTE — CHART NOTE - NSCHARTNOTEFT_GEN_A_CORE
EVENT:Notified by RN of pt c/o dizziness    SUBJECTIVE:Pt seen and examined . Pt sitting up on side of bed, speaking on cellphone , crying. Pt endorses she is currently upset about some package at home.   Pt endorses feeling dizzy 'when my blood is low..., had blood in toilet when (she ) went to bathroom this morning...had small (nonbloody BM ) this morning. Pt with generalized pain .   Pt denies headaches, chest palpitations, N/V/diarrhea.     OBJECTIVE:  Vital Signs Last 24 Hrs  T(C): 36.5 (11 Dec 2022 13:41), Max: 37 (11 Dec 2022 02:33)  T(F): 97.7 (11 Dec 2022 13:41), Max: 98.6 (11 Dec 2022 02:33)  HR: 81 (11 Dec 2022 13:41) (62 - 81)  BP: 94/56 (11 Dec 2022 13:41) (93/47 - 107/57)  BP(mean): --  RR: 17 (11 Dec 2022 13:41) (17 - 18)  SpO2: 100% (11 Dec 2022 13:41) (100% - 100%)    Parameters below as of 11 Dec 2022 13:41  Patient On (Oxygen Delivery Method): nasal cannula  O2 Flow (L/min): 2    -----------  Gen: crying /upset about package at home   HEENT: PERRL, anicteric, no oral mucositis  Resp: Clear breath sounds on auscultation. No rales, no wheezing  CVS: S1S2 present, regular  GI: BS(+), Soft, ND, NT  Extremities : Bilat ankles (+) edema . No  calf tenderness. PPP  Neuro: Awake/alert.  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 Patient states that she developed sudden onset dyspnea 2 days ago at rest. The dyspnoea is not alleviated or worsened by any factors. Pt also endorses vaginal bleeding for which she was evaluated by OBGYN at [facility] and was recommended further work up including TVUS but patient refused. Patient denies any chest pain. She was recently admitted at [facility] in October 2022 for hemoptysis and CT chest at that time showed Numerous bilateral pulmonary metastases; mild interval increase in size of a few of the largest pulmonary metastases compared to 9/20/202. Hospital course at that time was complicated by a drop in Hb for which she received blood transfusion. She also tested positive for COVID 19 but was asymptomatic. Pt has a chronic nephrostomy tube that was placed at [facility] for right hydronephrosis. She follows with Dr Kan at [facility]. Pt last recieved chemotherapy in may of 2022. She denies any fevers, chest pain, abd pain.    In ED VS: T 98, Hb 7.8, WBC 15, Na 132   (01 Dec 2022 19:07)    Pt admitted for dyspnea, anemia 2/2 vaginal bleeding.   Now with dizziness likely due to anemia.   PLAN:  1) Dizziness likely due to anemia  -Monitor CBC and transfuse for Hgb < 7.0   -orthostatic BP  Encourage hydration    2) Anxiety  -Supportive measures.     Genet Lira NP Medicine  [phone]

## 2022-12-12 PROBLEM — Z51.5 ENCOUNTER FOR PALLIATIVE CARE: Status: ACTIVE | Noted: 2022-01-01

## 2022-12-12 PROBLEM — R53.83 FATIGUE: Status: ACTIVE | Noted: 2022-01-01

## 2022-12-12 PROBLEM — R06.00 DYSPNEA: Status: ACTIVE | Noted: 2022-01-01

## 2022-12-12 NOTE — PROGRESS NOTE ADULT - SUBJECTIVE AND OBJECTIVE BOX
Patient is seen and examined at the bed side, is afebrile. No new events.  The Leukocytosis continues  trending down.      REVIEW OF SYSTEMS: All other review systems are negative      ALLERGIES: No Known Allergies      Vital Signs Last 24 Hrs  T(C): 37 (12 Dec 2022 14:49), Max: 37 (12 Dec 2022 14:49)  T(F): 98.6 (12 Dec 2022 14:49), Max: 98.6 (12 Dec 2022 14:49)  HR: 77 (12 Dec 2022 14:49) (68 - 93)  BP: 103/66 (12 Dec 2022 14:49) (101/44 - 125/80)  BP(mean): --  RR: 18 (12 Dec 2022 14:49) (17 - 18)  SpO2: 100% (12 Dec 2022 14:49) (96% - 100%)    Parameters below as of 12 Dec 2022 14:49  Patient On (Oxygen Delivery Method): nasal cannula  O2 Flow (L/min): 3        PHYSICAL EXAM:  GENERAL: Appears ill , on oxygen via NC  CHEST/LUNG:  Not using accessory muscles   HEART: s1 and s2 present  ABDOMEN:  Nontender and  Nondistended  EXTREMITIES: No pedal  edema  CNS: Awake and Alert        LABS:                           8.8    12.09 )-----------( 336      ( 12 Dec 2022 05:35 )             29.1                9.3    15.81 )-----------( 318      ( 10 Dec 2022 05:25 )             30.7                           9.3    17.67 )-----------( 319      ( 09 Dec 2022 05:32 )             31.1         12-12    135  |  99  |  16  ----------------------------<  93  3.9   |  26  |  0.74    Ca    10.8<H>      12 Dec 2022 05:35      12-10    136  |  101  |  20<H>  ----------------------------<  115<H>  3.6   |  28  |  0.88    Ca    11.0<H>      10 Dec 2022 05:25    TPro  6.2  /  Alb  x   /  TBili  x   /  DBili  x   /  AST  x   /  ALT  x   /  AlkPhos  x   12-09    PT/INR - ( 01 Dec 2022 16:25 )   PT: 15.4 sec;   INR: 1.29 ratio      PTT - ( 01 Dec 2022 16:25 )  PTT:24.9 sec        MEDICATIONS  (STANDING):    amoxicillin 500 milliGRAM(s) Oral every 8 hours  enoxaparin Injectable 40 milliGRAM(s) SubCutaneous every 24 hours  ferrous    sulfate 325 milliGRAM(s) Oral daily  folic acid 1 milliGRAM(s) Oral daily  lactulose Syrup 15 Gram(s) Oral every other day  lidocaine   4% Patch 1 Patch Transdermal every 24 hours  pantoprazole    Tablet 40 milliGRAM(s) Oral every 12 hours  polyethylene glycol 3350 17 Gram(s) Oral daily  senna 2 Tablet(s) Oral at bedtime      RADIOLOGY & ADDITIONAL TESTS:    12/1/22 : Xray Chest 1 View- PORTABLE-Urgent (Xray Chest 1 View- PORTABLE-Urgent .) (12.01.22 @ 18:12) >    Left ICD. Normal heart mediastinum. Hyperinflated lungs. Multiple parenchymal nodules of varying sizes reidentified similar to prior   consistent with metastatic disease. No acute infiltrate pleural effusion  or pneumothorax.    IMPRESSION: No acute infiltrate. pulmonary metastatic nodules similar to  prior        MICROBIOLOGY DATA:    Culture - Blood (12.04.22 @ 12:36)   Specimen Source: .Blood Blood   Culture Results: No growth to date.     Culture - Urine (12.04.22 @ 04:43)   Specimen Source: Clean Catch Clean Catch (Midstream)   Culture Results: <10,000 CFU/mL Normal Urogenital Vandana     Urine Microscopic-Add On (NC) (12.04.22 @ 04:43)   Red Blood Cell - Urine: >50 /HPF   White Blood Cell - Urine: >50 /HPF   Bacteria: Many /HPF   Epithelial Cells: Few /HPF       COVID-19 PCR (12.01.22 @ 16:25)   COVID-19 PCR: NotDetec:

## 2022-12-12 NOTE — PROGRESS NOTE ADULT - ASSESSMENT
( note written / Date of service   12-12-22 )    ==================>> MEDICATIONS <<====================    amoxicillin 500 milliGRAM(s) Oral every 8 hours  enoxaparin Injectable 40 milliGRAM(s) SubCutaneous every 24 hours  ferrous    sulfate 325 milliGRAM(s) Oral daily  folic acid 1 milliGRAM(s) Oral daily  lactulose Syrup 15 Gram(s) Oral every other day  lidocaine   4% Patch 1 Patch Transdermal every 24 hours  pantoprazole    Tablet 40 milliGRAM(s) Oral every 12 hours  polyethylene glycol 3350 17 Gram(s) Oral daily  senna 2 Tablet(s) Oral at bedtime    MEDICATIONS  (PRN):  acetaminophen     Tablet .. 325 milliGRAM(s) Oral every 4 hours PRN Moderate Pain (4 - 6)  aluminum hydroxide/magnesium hydroxide/simethicone Suspension 30 milliLiter(s) Oral every 4 hours PRN Dyspepsia  bisacodyl Suppository 10 milliGRAM(s) Rectal daily PRN Constipation  diphenhydrAMINE 25 milliGRAM(s) Oral every 4 hours PRN Itching  morphine   Solution 5 milliGRAM(s) Oral every 3 hours PRN Severe Pain (7 - 10)  simethicone 80 milliGRAM(s) Chew every 6 hours PRN Gas    ___________  Active diet:  Diet, Regular:   Supplement Feeding Modality:  Oral  Ensure Enlive Cans or Servings Per Day:  1       Frequency:  Three Times a day  ___________________    ==================>> VITAL SIGNS <<==================     Vital Signs Last 24 HrsT(C): 37 (12-12-22 @ 14:49)  T(F): 98.6 (12-12-22 @ 14:49), Max: 98.6 (12-12-22 @ 14:49)  HR: 77 (12-12-22 @ 14:49) (68 - 93)  BP: 103/66 (12-12-22 @ 14:49)  RR: 18 (12-12-22 @ 14:49) (17 - 18)  SpO2: 100% (12-12-22 @ 14:49) (96% - 100%)      CAPILLARY BLOOD GLUCOSE         ==================>> LAB AND IMAGING <<==================                        8.8    12.09 )-----------( 336      ( 12 Dec 2022 05:35 )             29.1        12-12    135  |  99  |  16  ----------------------------<  93  3.9   |  26  |  0.74    Ca    10.8<H>      12 Dec 2022 05:35         _________________________________________________________________________________________  ========>>  M E D I C A L   A T T E N D I N G    F O L L O W  U P  N O T E  <<=========  -----------------------------------------------------------------------------------------------------    - Patient seen and examined by me earlier today.   - In summary,  JEF HOUSE is a 56y year old woman admitted with SOB  - Patient today overall doing ok, comfortable, eating fairly , pain controlled with morphine for the most part      patient insisting she needs O2 for home because feels SOB.. but so far not qualifying as O2 sat nearly 100% on room air ( explained to patient )     ==================>> REVIEW OF SYSTEM <<=================    GEN: no fever, no chills, weak, as above   RESP: feels SOB, no cough, no sputum   CVS: no chest pain, no palpitations, mild ankle edema  GI: no abdominal pain, no nausea, chronic constipation: improved   : no dysuria, no frequency, chronic on and off hematuria,  (chronic intermittent vaginal bleeding due to tumor extension)   Neuro: no headache, no dizziness  Derm : no itching, no rash    ==================>> PHYSICAL EXAM <<=================    GEN: A&O X 3 , NAD , fairly comfortable, calm in bed  HEENT: NCAT, PERRL, MMM, hearing intact  Neck: supple , no JVD appreciated  CVS: S1S2 , regular , No M/R/G appreciated  PULM: CTA B/L,  no W/R/R appreciated  ABD.: soft. non tender, non distended,  bowel sounds present  Extrem: intact pulses , no edema       nephrostomy tube in place and draining clear yellow urine   PSYCH : normal mood,  anxious                                    ( note written / Date of service   12-12-22 )    ==================>> MEDICATIONS <<====================    amoxicillin 500 milliGRAM(s) Oral every 8 hours  enoxaparin Injectable 40 milliGRAM(s) SubCutaneous every 24 hours  ferrous    sulfate 325 milliGRAM(s) Oral daily  folic acid 1 milliGRAM(s) Oral daily  lactulose Syrup 15 Gram(s) Oral every other day  lidocaine   4% Patch 1 Patch Transdermal every 24 hours  pantoprazole    Tablet 40 milliGRAM(s) Oral every 12 hours  polyethylene glycol 3350 17 Gram(s) Oral daily  senna 2 Tablet(s) Oral at bedtime    MEDICATIONS  (PRN):  acetaminophen     Tablet .. 325 milliGRAM(s) Oral every 4 hours PRN Moderate Pain (4 - 6)  aluminum hydroxide/magnesium hydroxide/simethicone Suspension 30 milliLiter(s) Oral every 4 hours PRN Dyspepsia  bisacodyl Suppository 10 milliGRAM(s) Rectal daily PRN Constipation  diphenhydrAMINE 25 milliGRAM(s) Oral every 4 hours PRN Itching  morphine   Solution 5 milliGRAM(s) Oral every 3 hours PRN Severe Pain (7 - 10)  simethicone 80 milliGRAM(s) Chew every 6 hours PRN Gas    ___________  Active diet:  Diet, Regular:   Supplement Feeding Modality:  Oral  Ensure Enlive Cans or Servings Per Day:  1       Frequency:  Three Times a day  ___________________    ==================>> VITAL SIGNS <<==================     Vital Signs Last 24 HrsT(C): 37 (12-12-22 @ 14:49)  T(F): 98.6 (12-12-22 @ 14:49), Max: 98.6 (12-12-22 @ 14:49)  HR: 77 (12-12-22 @ 14:49) (68 - 93)  BP: 103/66 (12-12-22 @ 14:49)  RR: 18 (12-12-22 @ 14:49) (17 - 18)  SpO2: 100% (12-12-22 @ 14:49) (96% - 100%)         ==================>> LAB AND IMAGING <<==================                        8.8    12.09 )-----------( 336      ( 12 Dec 2022 05:35 )             29.1        12-12    135  |  99  |  16  ----------------------------<  93  3.9   |  26  |  0.74    Ca    10.8<H>      12 Dec 2022 05:35      ___________________________________________________________________________________  ===============>>  A S S E S S M E N T   A N D   P L A N <<===============  ------------------------------------------------------------------------------------------    · Assessment	  56 year old female with PMHx of Asthma, Anemia, Bladder cancer with metastases to the lungs, Bradycardia, Hypertension, Dyslipidemia, Hydronephrosis, R-nephrostomy tube, Liver cyst, Parathyroid tumor, Chronic pain and 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     Problem/Plan - 1:  ·  Problem: dyspnea.   ·  Plan: Chest xray showing metastatic disease unchanged from prior : most likely cause of dyspnea ( although anemia contributing)   high risk for DVT/PE but with contraindication for AC due to active chronic vaginal bleeding due to cancer and patient has been saturating well and not tachycardic   home medication morphine for dyspnea and pain.  as above asking for home O2 and trying to set up on her own , stating she was approved for it before !!     Problem/Plan - 2:  ·  Problem: Anemia.   post 2 Units PRBC with good results   f/u CBC post transfusion  hem following       12-02-22 @ 05:45  Iron:     16 ug/dL  Iron %:   9 %  TIBC:     180 ug/dL  iron supplement   ( could not get IV iron as does not have IV access)     *** unclear cause of leukocytosis ... ? reactive    monitor for now     improved     Problem/Plan - 3:  ·  Problem: Vaginal bleeding.   ·  Plan: hx of vaginal bleeding, declined obgyn assessment at [facility]  Hx of bladder cancer likely causing bleeding   pt seen by GYN at [facility]: no intervention possible at this time..   pt will need treatment for cancer     Problem/Plan - 4:  ·  Problem: Hyponatremia. improved  encourage PO intake     Problem/Plan - 5:  ·  Problem: Bladder cancer.   ·  Plan: Hx of Bladder Ca with mets  last chemotherapy in may of 2022.  hem onc following  palliative appreciated   continue current pain mgmt  patient very resistant in trying new medications    **nephrostomy tube overdue for exchange>>> changed by IR     worsened leukocytosis post pricedure / likely reactive ?     ** pt being treated by ID for recurrent UTIS !      monitor WBC    ** hypercalcemia     appreciated Hem onc eval    pt declined treatment but ended up taking the bisphosphonates      monitor    Problem/Plan - 6:  ·  Problem: Prophylactic measure.   ·  Plan: SCD  PPI.  S/S eval appreciated  patient declined taking antiacid meds / famotidine / PPI    Dispo planing home  patient not interested in hospice   supportive care   OP Onc follow up   patient medically stable for DC     --------------------------------------------  Case discussed with pt, sister, NP  Education given on findings and plan of care  ___________________________  H. NIEVES Ochoa.  Pager: [phone]

## 2022-12-12 NOTE — PROGRESS NOTE ADULT - ASSESSMENT
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 Patient states that she developed sudden onset dyspnea 2 days ago at rest. She was recently admitted at [facility] in October 2022 for hemoptysis and CT chest at that time showed Numerous bilateral pulmonary metastases; mild interval increase in size of a few of the largest pulmonary metastases compared to 9/20/202. Hospital course at that time was complicated by a drop in Hb for which she received blood transfusion. She also tested positive for COVID 19 but was asymptomatic. Pt has a chronic nephrostomy tube that was placed at [facility] for right hydronephrosis. She follows with Dr Kan at [facility]. Pt last received chemotherapy in may of 2022.  The ID consult requested today, 12/3/22, to assist with evaluation of UTI.    # Recurrent  UTIs- Urine cx form 12/4 has no growth   # Leukocytosis -resolving  # Metastatic Bladder cancer - s/p  chemo on May, 2022    would recommend:    1. OOB to chair   2. Bowel regimen for constipation  3. Monitor WBC count, is trending down  4. Pain management as needed  5. Continue oral Amoxicillin until 12/12/22 , today  6. Monitor kidney function       Attending Attestation:    Spent more than 35 minutes on total encounter, more than 50 % of the visit was spent counseling and/or coordinating care by the Attending physician.

## 2022-12-12 NOTE — PROGRESS NOTE ADULT - SUBJECTIVE AND OBJECTIVE BOX
NP Note discussed with Primary Attending.      Patient is a 56y old  Female who presents with a chief complaint of dyspnea (11 Dec 2022 16:24)      INTERVAL HPI/OVERNIGHT EVENTS: no new complaints    MEDICATIONS  (STANDING):  amoxicillin 500 milliGRAM(s) Oral every 8 hours  enoxaparin Injectable 40 milliGRAM(s) SubCutaneous every 24 hours  ferrous    sulfate 325 milliGRAM(s) Oral daily  folic acid 1 milliGRAM(s) Oral daily  lactulose Syrup 15 Gram(s) Oral every other day  lidocaine   4% Patch 1 Patch Transdermal every 24 hours  pantoprazole    Tablet 40 milliGRAM(s) Oral every 12 hours  polyethylene glycol 3350 17 Gram(s) Oral daily  senna 2 Tablet(s) Oral at bedtime    MEDICATIONS  (PRN):  acetaminophen     Tablet .. 325 milliGRAM(s) Oral every 4 hours PRN Moderate Pain (4 - 6)  aluminum hydroxide/magnesium hydroxide/simethicone Suspension 30 milliLiter(s) Oral every 4 hours PRN Dyspepsia  bisacodyl Suppository 10 milliGRAM(s) Rectal daily PRN Constipation  diphenhydrAMINE 25 milliGRAM(s) Oral every 4 hours PRN Itching  morphine   Solution 5 milliGRAM(s) Oral every 3 hours PRN Severe Pain (7 - 10)  simethicone 80 milliGRAM(s) Chew every 6 hours PRN Gas      __________________________________________________  REVIEW OF SYSTEMS:    CONSTITUTIONAL: No fever,   EYES: no acute visual disturbances  NECK: No pain or stiffness  RESPIRATORY: No cough; + shortness of breath  CARDIOVASCULAR: No chest pain, no palpitations  GASTROINTESTINAL: No pain. No nausea or vomiting; No diarrhea   NEUROLOGICAL: No headache or numbness, no tremors  MUSCULOSKELETAL: Lt LE pain joint pain, no muscle pain  GENITOURINARY: Rt nephrostomy, no dysuria, no frequency, no hesitancy  PSYCHIATRY: no depression , no anxiety  ALL OTHER  ROS negative        Vital Signs Last 24 Hrs  T(C): 37 (12 Dec 2022 14:49), Max: 37 (12 Dec 2022 14:49)  T(F): 98.6 (12 Dec 2022 14:49), Max: 98.6 (12 Dec 2022 14:49)  HR: 77 (12 Dec 2022 14:49) (68 - 93)  BP: 103/66 (12 Dec 2022 14:49) (101/44 - 125/80)  BP(mean): --  RR: 18 (12 Dec 2022 14:49) (17 - 18)  SpO2: 100% (12 Dec 2022 14:49) (96% - 100%)    Parameters below as of 12 Dec 2022 14:49  Patient On (Oxygen Delivery Method): nasal cannula  O2 Flow (L/min): 3      ________________________________________________  PHYSICAL EXAM:  GENERAL: NAD  HEENT: Normocephalic;  conjunctivae and sclerae clear; moist mucous membranes;   NECK : supple  CHEST/LUNG: Clear to auscultation bilaterally with good air entry   HEART: S1 S2  regular; no murmurs, gallops or rubs  ABDOMEN: Soft, Nontender, Nondistended; Bowel sounds present  EXTREMITIES: no cyanosis; + LLE edema; no calf tenderness  SKIN: warm and dry; no rash  NERVOUS SYSTEM:  Awake and alert; Oriented  to place, person and time ; no new deficits    _________________________________________________  LABS:                        8.8    12.09 )-----------( 336      ( 12 Dec 2022 05:35 )             29.1     12-12    135  |  99  |  16  ----------------------------<  93  3.9   |  26  |  0.74    Ca    10.8<H>      12 Dec 2022 05:35          CAPILLARY BLOOD GLUCOSE            RADIOLOGY & ADDITIONAL TESTS:  ACC: [id number] EXAM:  SP FLUOROSCOPY TO 1 HOUR                        ACC: [id number] EXAM:  SP NEPHROSTOGRM NEW AC RT SISC                        ACC: [id number] EXAM:  IR PROCEDURE                        ACC: [id number] EXAM:  SP CHNG NEPHRO CATH RT SISC                          PROCEDURE DATE:  12/07/2022          INTERPRETATION:  PROCEDURE: Right nephrostomy catheter evaluation and   exchange.    : ALEJANDRINA Araya, HALIE Prabhakar MD    ANESTHESIA: Intravenous sedation was administered by the attending   anesthesiologist. A total of 1 mL of 1% lidocaine was used for local   anesthesia.    CLINICAL HISTORY: 57 yo female with a history of bladder cancer and   chronic right nephrostomy catheter, last exchanged on 08/22/2022. Patient   presents for an exchange of right nephrostomy tube.    TECHNIQUE: After discussing the risks, benefits and alternatives to the   procedure, informed consent was obtained. A timeout was performed.    Patient was placed prone on the fluoroscopy examination tableand   connected to physiologic monitoring. Examination of the patient's   nephrostomy drain site demonstrated a well-positioned catheter. The right   flank and nephrostomy catheter was prepped and draped in a sterile   fashion. Preliminary fluoroscopy demonstrated a pigtail catheter   overlying the right hemiabdomen with no evidence of kink or fracture. A   nephrostogram was performed, which demonstrated a minimally dilated   collecting system, with a slightly retracted catheter within the lower   pole. The catheter hub was cut and multiple attempts were made to advance   various wires via the catheter into the collecting system, however a wire   was not able to be passed due to encrustation of the catheter. With the   aid of 0.035 Glidewire,the catheter was gently removed under   fluoroscopic observation. A 4 Nauruan Berenstein catheter and Glidewire   were used to re-catheterize the right renal pelvis, which was confirmed   with injection of contrast. Subsequently, a stiff 0.035 wire was advanced   into the collecting system and the Berenstein catheter was exchanged over   the wire for a new 10.2 Nauruan locking loop pigtail catheter, which was   formed within the right renal pelvis under fluoroscopic observation. A   nephrostogram was performed, confirming catheter positioning. The   catheter was flushed with normal saline, secured to patient's skin with   2-0 nylon suture, and connected to gravity drainage. A dry sterile   dressing was applied.    The patient tolerated the procedure well and was transferred from the   department in stable condition. There were no immediate complications.    IMPRESSION: Successful fluoroscopy-guided exchange of right nephrostomy   catheter, as described above.    The patient was instructed to make a follow-up appointment for routine   exchange in 1-2 months, or sooner if she encounters problems with the   catheter.    --- End of Report ---          PEE TINOCO ,Interventional Radiology  This document has been electronically signed.  URBANO PRABHAKAR MD; Attending Interventional Radiologist  This document has been electronically signed. Dec  9 2022 12:10PM      ACC: [id number] EXAM:  US DPLX LWR EXT VEINS COMPL BI                          PROCEDURE DATE:  12/04/2022          INTERPRETATION:  CLINICAL INFORMATION: Bilateral leg pain.    COMPARISON: None available.    TECHNIQUE: Duplex sonography of the BILATERAL LOWER extremity veins with   color and spectral Doppler, with and without compression.    FINDINGS:    RIGHT:  Normal compressibility of the RIGHT common femoral, femoral and popliteal   veins.  Doppler examination shows normal spontaneous and phasic flow.  No RIGHT calf vein thrombosis is detected.    LEFT:  Normal compressibility of the LEFT common femoral, femoral and popliteal   veins.  Doppler examination shows normal spontaneous and phasic flow.  No LEFT calf vein thrombosis is detected.    IMPRESSION:  No evidence of deep venous thrombosis in either lower extremity.          --- End of Report ---            LAUREN GUERRERO MD; Attending Radiologist  This document has been electronically signed. Dec  4 2022  1:28PM  ACC: [id number] EXAM:  XR CHEST PORTABLE URGENT 1V                          PROCEDURE DATE:  12/01/2022          INTERPRETATION:  Short of breath.    AP chest. Prior 10/25/2022.    Left ICD. Normal heart mediastinum. Hyperinflated lungs. Multiple   parenchymal nodules of varying sizes reidentified similar to prior   consistent with metastatic disease. No acute infiltrate pleural effusion   or pneumothorax.    IMPRESSION: No acute infiltrate. pulmonary metastatic nodules similar to   prior    --- End of Report ---            GERA PIKE MD; Attending Radiologist  This document has been electronically signed. Dec  2 2022  9:48AM    Imaging  Reviewed:  YES/NO    Consultant(s) Notes Reviewed:   YES/ No      Plan of care was discussed with patient and /or primary care giver; all questions and concerns were addressed

## 2022-12-12 NOTE — PROGRESS NOTE ADULT - PROBLEM SELECTOR PLAN 1
Chest X-ray showing metastatic disease unchanged from prior : most likely cause of dyspnea ( although anemia contributing)   high risk for DVT/PE but with contraindication for AC due to active chronic vaginal bleeding due to cancer and patient has been saturating well and not tachycardic   home medication morphine for dyspnea and pain.  as above asking for home O2 and trying to set up on her own , stating she was approved for it before !!

## 2022-12-12 NOTE — DATA REVIEWED
[FreeTextEntry1] : CT C/A/P  (10/17/2022): \par \par COMPARISON: CT chest, abdomen and pelvis from 9/20/2022.\par \par FINDINGS:\par CHEST:\par LUNGS AND LARGE AIRWAYS: Redemonstration of innumerable bilateral pulmonary nodules consistent with metastases. A few of the largest pulmonary nodules are mildly increased in size. For example, left lower lobe nodule measures 3.3 x 2.3 cm (2:116), previously 2.8 x 2.0 cm on 9/20/2022 . A right lower lobe nodule measures 2.4 x 2.2 cm (2:103), previously 2.2 x 2.1 cm.\par PLEURA: No pleural effusion.\par VESSELS: Within normal limits.\par HEART: Heart size is normal. Small pericardial effusion.\par MEDIASTINUM AND FLORENTIN: No lymphadenopathy.\par CHEST WALL AND LOWER NECK: Left chest wall pacemaker with leads in the right atrium and ventricle.\par \par ABDOMEN AND PELVIS:\par LIVER: Interval increase in size of hepatic metastases. For reference, segment 7 mass measures 4.0 x 2.3 cm (2:148), previously 3.3 x 2.2 cm.\par BILE DUCTS: Normal caliber.\par GALLBLADDER: Within normal limits.\par SPLEEN: Within normal limits.\par PANCREAS: Within normal limits.\par ADRENALS: Within normal limits.\par KIDNEYS/URETERS: Right percutaneous nephrostomy catheter; no right hydronephrosis. 0.4 cm nonobstructing stone in the right lower pole. Left kidney is enlarged with severe hydroureteronephrosis and markedly dilated calyces, unchanged. There are nonobstructive stones of the left kidney.\par \par BLADDER: Large bladder mass is without significant change.\par REPRODUCTIVE ORGANS: Uterus and adnexa within normal limits.\par \par BOWEL: No bowel obstruction. Appendix is normal.\par PERITONEUM: No ascites.\par VESSELS: Within normal limits.\par RETROPERITONEUM/LYMPH NODES: Retroperitoneal and pelvic lymphadenopathy is unchanged.\par ABDOMINAL WALL: Within normal limits.\par BONES: No evidence of osseous metastatic disease.\par \par IMPRESSION:\par 1. Large bladder mass is without significant change. Severe chronic hydronephrosis of the left kidney is unchanged. Right nephrostomy tube in place; no right hydronephrosis.\par 2. Numerous bilateral pulmonary metastases; mild interval increase in size of a few of the largest pulmonary metastases compared to 9/20/2022.\par 3. Progression of liver metastases.\par 4. No evidence of osseous metastatic disease.

## 2022-12-12 NOTE — PROGRESS NOTE ADULT - ASSESSMENT
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 Patient also endorses vaginal bleeding for which she was evaluated by OBGYN at [facility] and was recommended further work up including TVUS but patient refused. Pt has a chronic nephrostomy tube that was placed at [facility] for right hydronephrosis. She follows with Dr Kan at [facility]. Pt last received chemotherapy in may of 2022. Patient admitted to medicine for dyspnea, Chest x-ray showing metastatic disease unchanged from prior. Patient at high risk for DVT/PE but with contraindication for AC due to active chronic vaginal bleeding due to cancer and patient has been saturating well and not tachycardic, home medication morphine for dyspnea and pain.   ID, Hem/onc and pain consulted. Patient admission complicated by acute anemia. Pt transfused 2U PRBC with a good response. Palliative consulted for conversation about hospice due to advanced metastatic cancer, patient refused services at this time and wants to f/o with her new oncologist after discharge. Patient c/o left foot swelling and pain, unsure if something dropped on her foot?  X-ray of left foot performed pending official reading. CM onboard for discharge planning. Patient medically cleared for discharge.

## 2022-12-12 NOTE — PROGRESS NOTE ADULT - PROBLEM SELECTOR PLAN 4
Hx of Bladder Ca with mets  last chemotherapy in may of 2022.  hem onc following  palliative appreciated   continue current pain mgmt.  patient very resistant in trying new medications  hypercalcemia     appreciated Hem onc eval    pt declined treatment but ended up taking the bisphosphonates      monitor

## 2022-12-12 NOTE — GOALS OF CARE CONVERSATION - ADVANCED CARE PLANNING - CONVERSATION DETAILS
Palliative care SW spoke with pt and her sister/HCP Giovanna at bedside to provide emotional support. Pt reported that she was not feeling well and deferred conversation to her sister. Pt's sister Giovanna reported that she is adjusting appropriately to her sister's hospitalization and poor prognosis. Pt's sister expressed feelings of anticipatory grief around the pt's recent decline, but continued to share that she is "staying strong" for her sister. Pt's sister shared that the pt is well supported by their large family and that they are going to continue to provide the pt with emotional support as well as bedside support. Pt's sister shared that she has been taking off from work to support her sister, in addition to the plan of the pt's son to hopefully be able to take a brief leave from the  to be at his mother's bedside. Palliative care SW provided empathetic listening and emotional support. Pt's sister reported that she had no further psychosocial needs and agreed to reach out as needed.

## 2022-12-12 NOTE — PROGRESS NOTE ADULT - PROBLEM SELECTOR PLAN 2
Post 2 Units PRBC with good results   hem/onc is following  Iron:     16 ug/dL  Iron %:   9 %  TIBC:     180 ug/dL  iron supplement   cbc qd

## 2022-12-12 NOTE — PROGRESS NOTE ADULT - PROBLEM SELECTOR PLAN 5
hx of vaginal bleeding, declined obgyn assessment at [facility]  Hx of bladder cancer likely causing bleeding   pt seen by GYN at [facility]: no intervention possible at this time..   pt will need treatment for cancer

## 2022-12-12 NOTE — ASSESSMENT
[______] : HCP: [unfilled] [FreeTextEntry1] : 56yoF with:\par \par # Bladder cancer - Patient is transferring oncologic care to Dr. Kan here at [facility] [[email or web address]], scheduled for appointment on 12/5/22. \par \par # Low back pain - Patient is hesitant to start a long-acting opioid. Suggest increasing the morphine solution to 7mg PRN, with plans to slowly uptitrate. Will re-address the recommendation to start a long-acting opioid. \par \par # Fatigue/SOB - Check blood work to evaluate for symptomatic anemia in light of patient's vaginal bleeding. \par \par # Encounter for palliative care- Emotional support provided \par Explained the role of palliative care in enhancing quality of life in the setting of serious illness.\par Patient states that her older sister Giovanna is her HCP. \par \par Follow up in 2 weeks, call sooner with questions or issues.

## 2022-12-12 NOTE — PROGRESS NOTE ADULT - PROBLEM SELECTOR PLAN 6
SCD for dvt ppx  PPI.  S/S eval appreciated  patient declined taking antiacid meds / famotidine / PPI    Dispo planing home  patient not interested in hospice   supportive care   OP Onc follow up   patient medically stable for DC

## 2022-12-12 NOTE — HISTORY OF PRESENT ILLNESS
[Home] : at home, [unfilled] , at the time of the visit. [Medical Office: ([facility])___] : at the medical office located in  [Verbal consent obtained from patient] : the patient, [unfilled] [FreeTextEntry1] : 56yoF with bladder cancer presents for initial palliative care visit, referred by Dr. Alcocer. \par PMH significant for osteoporosis, h/o symptomatic bradycardia, has PPM in place since 2010.  \par \par Patient has been getting treatment for her bladder cancer at an outside facility. Was recently hospitalized at [facility] (10/17/22 - 11/15/22) with acute on chronic pain, SOB, hemoptysis. Inpatient palliative team saw patient, she was started on PRN morphine solution and prn cyclobenzaprine for her neoplasm related pain. \par She will be establishing oncologic care with Dr. Kan here at [facility], transferring care from Dr. Gay at [facility]. \par \par Main reason for which patient is referred is pain and symptom management. \par She experiences pain in her low back, is constant.  At its worst the pain is 10/10. Presently she is rating the pain at 9/10. She is using 5mg of morphine solution on a PRN basis. This makes her feel nauseous at times. She uses Tylenol 325mg in between the morphine doses. Lately she has been using the morphine about every 4 hours. It brings her pain down to a tolerable level for a period of time. \par \par She reports urinary urgency but difficulty with urinating. \par \par ROS:\par +light\par +fatigue and weakness\par +SOB\par +vaginal bleeding and occasional blood from urethra\par +reports a weight of 75lbs at present (baseline ~113lbs)\par +appetite is OK but she has been having difficulty with swallowing solids\par +constipation - has been eating prunes which helps her have a BM. Has a BM every 2-3 days\par +trouble sleeping - largely due to pain in her back. She spends the night sitting up in bed. \par Denies fevers but she has not been measuring it with a thermometer\par \par She requires some assistance with ADLs. Has been ambulating independently although was given a walker from the hospital but it was left at the hospital. Denies any recent falls. \par \par Patient is single, lives with her daughter and sister. Her daughter has myasthenia gravis. She gets to medical appointments via taxi. \par \par Cardiologist: Dr. Rosario\par \par I-STOP Ref#:  [id number]

## 2022-12-13 NOTE — PROGRESS NOTE ADULT - ASSESSMENT
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 Patient states that she developed sudden onset dyspnea 2 days ago at rest. She was recently admitted at [facility] in October 2022 for hemoptysis and CT chest at that time showed Numerous bilateral pulmonary metastases; mild interval increase in size of a few of the largest pulmonary metastases compared to 9/20/202. Hospital course at that time was complicated by a drop in Hb for which she received blood transfusion. She also tested positive for COVID 19 but was asymptomatic. Pt has a chronic nephrostomy tube that was placed at [facility] for right hydronephrosis. She follows with Dr Kan at [facility]. Pt last received chemotherapy in may of 2022.  The ID consult requested today, 12/3/22, to assist with evaluation of UTI.    # Recurrent  UTIs- Urine cx form 12/4 has no growth   # Leukocytosis -resolving  # Metastatic Bladder cancer - s/p  chemo on May, 2022    would recommend:    1. Pain management as needed  2. Bowel regimen for constipation  3. Monitor WBC count, is trending down  4. Monitor OFF Abx as she completed the course  5.  Monitor kidney function     d/w patient and the Primary team    Attending Attestation:    Spent more than 35 minutes on total encounter, more than 50 % of the visit was spent counseling and/or coordinating care by the Attending physician.

## 2022-12-13 NOTE — CHART NOTE - NSCHARTNOTEFT_GEN_A_CORE
Met with the pt and her sister Giovanna; pt continues to report uncontrolled pain and continues to refuse long acting pain medication because of s/e.  Pt remains overwhelmed by her diagnosis and is having a difficult time accepting treatment options and prognosis.  Palliative care will continue to provide support.

## 2022-12-13 NOTE — PROGRESS NOTE ADULT - PROBLEM SELECTOR PLAN 7
SCD for dvt ppx  PPI.      Dispo planing home  patient not interested in hospice   supportive care   OP Onc follow up   patient medically stable for DC

## 2022-12-13 NOTE — PROGRESS NOTE ADULT - PROBLEM SELECTOR PLAN 2
hgb 6.9-->9.0  MCV 23-->29.7  transferrin 156, ferritin 806 ( likely falsely elevated 2/2 malignancy), serum iron 16  Post 2 Units PRBC   hem/onc is following  no overt bleeding  hbg 9.0  iron supplement   cbc qd  Monitor CBC and transfuse to maintain hemoglobin > 7.

## 2022-12-13 NOTE — PROGRESS NOTE ADULT - PROBLEM SELECTOR PLAN 5
appreciated Hem onc eval  s/p pamidronate inj x 1 dose  Corrected calcium 12.4  f/u PTHrP. ( specimen received)

## 2022-12-13 NOTE — PROGRESS NOTE ADULT - SUBJECTIVE AND OBJECTIVE BOX
CHIEF COMPLAINT  Dyspnea    HISTORY OF PRESENT ILLNESS  JEF HOUSE is a 56y Female who presents with a chief complaint of dyspnea    No acute events. No complaints.    REVIEW OF SYSTEMS  A complete review of systems was performed; negative except per HPI    PHYSICAL EXAM  T(C): 36.8 (12-13-22 @ 04:49), Max: 37.1 (12-12-22 @ 21:00)  HR: 64 (12-13-22 @ 04:49) (64 - 77)  BP: 104/54 (12-13-22 @ 04:49) (103/66 - 105/68)  RR: 18 (12-13-22 @ 04:49) (18 - 18)  SpO2: 100% (12-13-22 @ 04:49) (100% - 100%)  Constitutional: alert, awake, in no acute distress  Eyes: PERRL, EOMI  HEENT: normocephalic, atraumatic  Neck: supple, non-tender  Cardiovascular: normal perfusion, no peripheral edema  Respiratory: normal respiratory efforts; no increased use of accessory muscles  Gastrointestinal: soft, non-tender  Musculoskeletal: normal range of motion, no deformities noted  Neurological: alert, CN II to XI grossly intact  Skin: warm, dry    LABORATORY DATA                        8.8    12.09 )-----------( 336      ( 12 Dec 2022 05:35 )             29.1     12-12    135  |  99  |  16  ----------------------------<  93  3.9   |  26  |  0.74    Ca    10.8<H>      12 Dec 2022 05:35     CHIEF COMPLAINT  Dyspnea    HISTORY OF PRESENT ILLNESS  JEF HOUSE is a 56y Female who presents with a chief complaint of dyspnea    No acute events. She continues to complain of abdominal and back pain.    REVIEW OF SYSTEMS  A complete review of systems was performed; negative except per HPI    PHYSICAL EXAM  T(C): 36.8 (12-13-22 @ 04:49), Max: 37.1 (12-12-22 @ 21:00)  HR: 64 (12-13-22 @ 04:49) (64 - 77)  BP: 104/54 (12-13-22 @ 04:49) (103/66 - 105/68)  RR: 18 (12-13-22 @ 04:49) (18 - 18)  SpO2: 100% (12-13-22 @ 04:49) (100% - 100%)  Constitutional: alert, awake, in no acute distress  Eyes: PERRL, EOMI  HEENT: normocephalic, atraumatic  Neck: supple, non-tender  Cardiovascular: normal perfusion, no peripheral edema  Respiratory: normal respiratory efforts; no increased use of accessory muscles  Gastrointestinal: soft, non-tender  Musculoskeletal: normal range of motion, no deformities noted  Neurological: alert, CN II to XI grossly intact  Skin: warm, dry    LABORATORY DATA                        8.8    12.09 )-----------( 336      ( 12 Dec 2022 05:35 )             29.1     12-12    135  |  99  |  16  ----------------------------<  93  3.9   |  26  |  0.74    Ca    10.8<H>      12 Dec 2022 05:35

## 2022-12-13 NOTE — PROGRESS NOTE ADULT - PROBLEM SELECTOR PLAN 4
Hx of Bladder Ca with mets  last chemotherapy in may of 2022.  hem onc following  palliative appreciated   continue current pain mgmt.

## 2022-12-13 NOTE — PROGRESS NOTE ADULT - ASSESSMENT
JEF HOUSE is a 56y Female who presents with a chief complaint of dyspnea.    Metastatic Bladder Cancer  - Patient previously followed with [facility] and at [facility]. Now in process of switching to [facility].  - Last chemotherapy was in May 2022. She never had immunotherapy  - Last imaging in October 2022 had shown worsening pulmonary and liver metastases.   - No systemic therapy while inpatient.  - Consult palliative care for goals of care evaluation. Patient reports that she has upcoming appointment with new oncologist on December 16th  - Pain medicine evaluation noted. Optimize pain control.     Anemia  - Patient presented with severe anemia; required blood transfusions.  - In the setting of active malignancy. Noted decreased transferrin saturation indicating iron deficiency as well.  - Can administer oral iron.  - Monitor CBC and transfuse to maintain hemoglobin > 7.    Hypercalcemia  - Corrected calcium today around 13.3  - Continue to monitor. Patient had received one dose of pamidronate  - IVF. Obtain PTH and PTHrP.   - Consider endocrinology evaluation.     Dyspnea  - Patient refused CTA. Ultrasound did not show any DVT.  - Not on supplemental oxygen.    Will continue to follow.    Reji Root MD  Hematology/Oncology  O: [phone]

## 2022-12-13 NOTE — PROGRESS NOTE ADULT - SUBJECTIVE AND OBJECTIVE BOX
Patient is seen and examined at the bed side, is afebrile. She is c/o pain.      REVIEW OF SYSTEMS: All other review systems are negative      ALLERGIES: No Known Allergies      Vital Signs Last 24 Hrs  T(C): 36.8 (13 Dec 2022 15:29), Max: 36.8 (13 Dec 2022 04:49)  T(F): 98.3 (13 Dec 2022 15:29), Max: 98.3 (13 Dec 2022 15:29)  HR: 86 (13 Dec 2022 15:29) (64 - 86)  BP: 93/58 (13 Dec 2022 15:29) (93/58 - 104/54)  BP(mean): --  RR: 18 (13 Dec 2022 15:29) (18 - 18)  SpO2: 100% (13 Dec 2022 15:29) (100% - 100%)    Parameters below as of 13 Dec 2022 15:29  Patient On (Oxygen Delivery Method): room air  O2 Flow (L/min): 0.5        PHYSICAL EXAM:  GENERAL: Appears ill , on oxygen via NC  CHEST/LUNG:  Not using accessory muscles   HEART: s1 and s2 present  ABDOMEN:  Nontender and  Nondistended  EXTREMITIES: No pedal  edema  CNS: Awake and Alert        LABS:                         9.0    12.15 )-----------( 332      ( 13 Dec 2022 07:20 )             29.7                9.3    15.81 )-----------( 318      ( 10 Dec 2022 05:25 )             30.7                           9.3    17.67 )-----------( 319      ( 09 Dec 2022 05:32 )             31.1           12-13    135  |  99  |  17  ----------------------------<  101<H>  4.1   |  26  |  0.81    Ca    11.0<H>      13 Dec 2022 07:20    TPro  7.1  /  Alb  1.8<L>  /  TBili  0.3  /  DBili  x   /  AST  22  /  ALT  17  /  AlkPhos  230<H>  12-13 12-12    135  |  99  |  16  ----------------------------<  93  3.9   |  26  |  0.74    Ca    10.8<H>      12 Dec 2022 05:35    TPro  6.2  /  Alb  x   /  TBili  x   /  DBili  x   /  AST  x   /  ALT  x   /  AlkPhos  x   12-09    PT/INR - ( 01 Dec 2022 16:25 )   PT: 15.4 sec;   INR: 1.29 ratio      PTT - ( 01 Dec 2022 16:25 )  PTT:24.9 sec        MEDICATIONS  (STANDING):    enoxaparin Injectable 40 milliGRAM(s) SubCutaneous every 24 hours  ferrous    sulfate 325 milliGRAM(s) Oral daily  folic acid 1 milliGRAM(s) Oral daily  lactulose Syrup 15 Gram(s) Oral every other day  lidocaine   4% Patch 1 Patch Transdermal every 24 hours  pantoprazole    Tablet 40 milliGRAM(s) Oral every 12 hours  polyethylene glycol 3350 17 Gram(s) Oral daily  senna 2 Tablet(s) Oral at bedtime        RADIOLOGY & ADDITIONAL TESTS:    12/1/22 : Xray Chest 1 View- PORTABLE-Urgent (Xray Chest 1 View- PORTABLE-Urgent .) (12.01.22 @ 18:12) >    Left ICD. Normal heart mediastinum. Hyperinflated lungs. Multiple parenchymal nodules of varying sizes reidentified similar to prior   consistent with metastatic disease. No acute infiltrate pleural effusion  or pneumothorax.    IMPRESSION: No acute infiltrate. pulmonary metastatic nodules similar to  prior        MICROBIOLOGY DATA:    Culture - Blood (12.04.22 @ 12:36)   Specimen Source: .Blood Blood   Culture Results: No growth to date.     Culture - Urine (12.04.22 @ 04:43)   Specimen Source: Clean Catch Clean Catch (Midstream)   Culture Results: <10,000 CFU/mL Normal Urogenital Vandana     Urine Microscopic-Add On (NC) (12.04.22 @ 04:43)   Red Blood Cell - Urine: >50 /HPF   White Blood Cell - Urine: >50 /HPF   Bacteria: Many /HPF   Epithelial Cells: Few /HPF       COVID-19 PCR (12.01.22 @ 16:25)   COVID-19 PCR: NotDetec:

## 2022-12-13 NOTE — PROGRESS NOTE ADULT - ASSESSMENT
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 Patient also endorses vaginal bleeding for which she was evaluated by OBGYN at [facility] and was recommended further work up including TVUS but patient refused. Pt has a chronic nephrostomy tube that was placed at [facility] for right hydronephrosis. She follows with Dr Kan at [facility]. Pt last received chemotherapy in may of 2022. Patient admitted to medicine for dyspnea, Chest x-ray showing metastatic disease unchanged from prior. Patient at high risk for DVT/PE but with contraindication for AC due to active chronic vaginal bleeding due to cancer and patient has been saturating well and not tachycardic, home medication morphine for dyspnea and pain.   ID, Hem/onc and pain consulted. Patient admission complicated by acute anemia. Pt transfused 2U PRBC with a good response. Palliative consulted for conversation about hospice due to advanced metastatic cancer, patient refused services at this time and wants to f/o with her new oncologist after discharge. Patient c/o left foot swelling and pain, unsure if something dropped on her foot?  X-ray of left foot performed pending official reading. CM onboard for discharge planning. Patient medically cleared for discharge.

## 2022-12-13 NOTE — PROGRESS NOTE ADULT - SUBJECTIVE AND OBJECTIVE BOX
Patient is a 56y old  Female who presents with a chief complaint of dyspnea (13 Dec 2022 07:28)      INTERVAL HPI/OVERNIGHT EVENTS: no overnight events    I&O's Summary    Vital Signs Last 24 Hrs  T(C): 36.8 (13 Dec 2022 04:49), Max: 37.1 (12 Dec 2022 21:00)  T(F): 98.2 (13 Dec 2022 04:49), Max: 98.7 (12 Dec 2022 21:00)  HR: 64 (13 Dec 2022 04:49) (64 - 77)  BP: 104/54 (13 Dec 2022 04:49) (103/66 - 105/68)  BP(mean): --  RR: 18 (13 Dec 2022 04:49) (18 - 18)  SpO2: 100% (13 Dec 2022 04:49) (100% - 100%)    Parameters below as of 13 Dec 2022 04:49  Patient On (Oxygen Delivery Method): nasal cannula  O2 Flow (L/min): 0.5    PAST MEDICAL & SURGICAL HISTORY:  Anemia      Asthma      HLD (hyperlipidemia)      Pacemaker  [employer]      Bladder cancer      HTN (hypertension)      Parathyroid tumor      Liver cyst      Hydronephrosis  has right Nephrostomy tube - dressing intact      Bradycardia      History of renal calculi      Birthmark      H/O myomectomy      Presence of cardiac pacemaker      H/O hydronephrosis  s/p Right Perc nephrostomy tube placement 02/2021, exchange 06/2021          SOCIAL HISTORY  Alcohol:  Tobacco:  Illicit substance use:      FAMILY HISTORY:      LABS:                        9.0    12.15 )-----------( 332      ( 13 Dec 2022 07:20 )             29.7     12-13    135  |  99  |  17  ----------------------------<  101<H>  4.1   |  26  |  0.81    Ca    11.0<H>      13 Dec 2022 07:20    TPro  7.1  /  Alb  1.8<L>  /  TBili  0.3  /  DBili  x   /  AST  22  /  ALT  17  /  AlkPhos  230<H>  12-13        CAPILLARY BLOOD GLUCOSE      MEDICATIONS  (STANDING):  enoxaparin Injectable 40 milliGRAM(s) SubCutaneous every 24 hours  ferrous    sulfate 325 milliGRAM(s) Oral daily  folic acid 1 milliGRAM(s) Oral daily  lactulose Syrup 15 Gram(s) Oral every other day  lidocaine   4% Patch 1 Patch Transdermal every 24 hours  pantoprazole    Tablet 40 milliGRAM(s) Oral every 12 hours  polyethylene glycol 3350 17 Gram(s) Oral daily  senna 2 Tablet(s) Oral at bedtime    MEDICATIONS  (PRN):  acetaminophen     Tablet .. 325 milliGRAM(s) Oral every 4 hours PRN Moderate Pain (4 - 6)  aluminum hydroxide/magnesium hydroxide/simethicone Suspension 30 milliLiter(s) Oral every 4 hours PRN Dyspepsia  bisacodyl Suppository 10 milliGRAM(s) Rectal daily PRN Constipation  diphenhydrAMINE 25 milliGRAM(s) Oral every 4 hours PRN Itching  morphine   Solution 5 milliGRAM(s) Oral every 3 hours PRN Severe Pain (7 - 10)  simethicone 80 milliGRAM(s) Chew every 6 hours PRN Gas      REVIEW OF SYSTEMS:  CONSTITUTIONAL: No fever  EYES: No eye pain  ENMT:   No sinus or throat pain  NECK: No pain   RESPIRATORY: No cough, + shortness of breath  CARDIOVASCULAR: No chest pain,  GASTROINTESTINAL: No abdominal  pain. No nausea, vomiting. + constipation.   GENITOURINARY: No dysuria, frequency, + vaginal bleeding  NEUROLOGICAL: No headaches  SKIN: No rashes  MUSCULOSKELETAL: + left ankle joint pain and swelling    RADIOLOGY & ADDITIONAL TESTS:    < from: Xray Chest 1 View- PORTABLE-Urgent (Xray Chest 1 View- PORTABLE-Urgent .) (12.01.22 @ 18:12) >    ACC: [id number] EXAM:  XR CHEST PORTABLE URGENT 1V                          PROCEDURE DATE:  12/01/2022          INTERPRETATION:  Short of breath.    AP chest. Prior 10/25/2022.    Left ICD. Normal heart mediastinum. Hyperinflated lungs. Multiple   parenchymal nodules of varying sizes reidentified similar to prior   consistent with metastatic disease. No acute infiltrate pleural effusion   or pneumothorax.    IMPRESSION: No acute infiltrate. pulmonary metastatic nodules similar to   prior    --- End of Report ---    GERA PIKE MD; Attending Radiologist  This document has been electronically signed. Dec  2 2022  9:48AM    < end of copied text >  ACC: [id number] EXAM:  US DPLX LWR EXT VEINS COMPL BI                          PROCEDURE DATE:  12/04/2022          INTERPRETATION:  CLINICAL INFORMATION: Bilateral leg pain.    COMPARISON: None available.    TECHNIQUE: Duplex sonography of the BILATERAL LOWER extremity veins with   color and spectral Doppler, with and without compression.    FINDINGS:    RIGHT:  Normal compressibility of the RIGHT common femoral, femoral and popliteal   veins.  Doppler examination shows normal spontaneous and phasic flow.  No RIGHT calf vein thrombosis is detected.    LEFT:  Normal compressibility of the LEFT common femoral, femoral and popliteal   veins.  Doppler examination shows normal spontaneous and phasic flow.  No LEFT calf vein thrombosis is detected.    IMPRESSION:  No evidence of deep venous thrombosis in either lower extremity.    --- End of Report ---    LAUREN GUERRERO MD; Attending Radiologist    Imaging Personally Reviewed:  [x ] YES  [ ] NO    Consultant(s) Notes Reviewed:  [x ] YES  [ ] NO    PHYSICAL EXAM:  GENERAL: NAD  HEAD:  Atraumatic, Normocephalic  EYES:  conjunctiva and sclera clear  ENMT:  Moist mucous membranes  NECK: Supple  NERVOUS SYSTEM:  Alert & Oriented X3  CHEST/LUNG: CTA bilaterally  HEART: Regular rate and rhythm  ABDOMEN: Soft, Nondistended; Bowel sounds present  EXTREMITIES:  2+ Peripheral Pulses, + left lower ext edema  SKIN: No rashes    Care Collaborated Discussed with Consultants/Other Providers [x ] YES  [ ] NO

## 2022-12-13 NOTE — PROGRESS NOTE ADULT - SUBJECTIVE AND OBJECTIVE BOX
Date of Service  : 12-13-22     INTERVAL HPI/OVERNIGHT EVENTS: My belly aches.   Vital Signs Last 24 Hrs  T(C): 36.8 (13 Dec 2022 15:29), Max: 37.1 (12 Dec 2022 21:00)  T(F): 98.3 (13 Dec 2022 15:29), Max: 98.7 (12 Dec 2022 21:00)  HR: 86 (13 Dec 2022 15:29) (64 - 86)  BP: 93/58 (13 Dec 2022 15:29) (93/58 - 105/68)  BP(mean): --  RR: 18 (13 Dec 2022 15:29) (18 - 18)  SpO2: 100% (13 Dec 2022 15:29) (100% - 100%)    Parameters below as of 13 Dec 2022 15:29  Patient On (Oxygen Delivery Method): room air  O2 Flow (L/min): 0.5    I&O's Summary    MEDICATIONS  (STANDING):  enoxaparin Injectable 40 milliGRAM(s) SubCutaneous every 24 hours  ferrous    sulfate 325 milliGRAM(s) Oral daily  folic acid 1 milliGRAM(s) Oral daily  lactulose Syrup 15 Gram(s) Oral every other day  lidocaine   4% Patch 1 Patch Transdermal every 24 hours  pantoprazole    Tablet 40 milliGRAM(s) Oral every 12 hours  polyethylene glycol 3350 17 Gram(s) Oral daily  senna 2 Tablet(s) Oral at bedtime    MEDICATIONS  (PRN):  acetaminophen     Tablet .. 325 milliGRAM(s) Oral every 4 hours PRN Moderate Pain (4 - 6)  aluminum hydroxide/magnesium hydroxide/simethicone Suspension 30 milliLiter(s) Oral every 4 hours PRN Dyspepsia  bisacodyl Suppository 10 milliGRAM(s) Rectal daily PRN Constipation  diphenhydrAMINE 25 milliGRAM(s) Oral every 4 hours PRN Itching  morphine   Solution 5 milliGRAM(s) Oral every 3 hours PRN Severe Pain (7 - 10)  simethicone 80 milliGRAM(s) Chew every 6 hours PRN Gas    LABS:                        9.0    12.15 )-----------( 332      ( 13 Dec 2022 07:20 )             29.7     12-13    135  |  99  |  17  ----------------------------<  101<H>  4.1   |  26  |  0.81    Ca    11.0<H>      13 Dec 2022 07:20    TPro  7.1  /  Alb  1.8<L>  /  TBili  0.3  /  DBili  x   /  AST  22  /  ALT  17  /  AlkPhos  230<H>  12-13        CAPILLARY BLOOD GLUCOSE              REVIEW OF SYSTEMS:  CONSTITUTIONAL: No fever, weight loss, or fatigue  EYES: No eye pain, visual disturbances, or discharge  ENMT:  No difficulty hearing, tinnitus, vertigo; No sinus or throat pain  NECK: No pain or stiffness  RESPIRATORY: No cough, wheezing, chills or hemoptysis; No shortness of breath  CARDIOVASCULAR: No chest pain, palpitations, dizziness, or leg swelling  GASTROINTESTINAL: No abdominal or epigastric pain. No nausea, vomiting, or hematemesis; No diarrhea or constipation. No melena or hematochezia.  GENITOURINARY: No dysuria, frequency, hematuria, or incontinence  NEUROLOGICAL: No headaches, memory loss, loss of strength, numbness, or tremors      RADIOLOGY & ADDITIONAL TESTS:    Consultant(s) Notes Reviewed:  [x ] YES  [ ] NO    PHYSICAL EXAM:  GENERAL: NAD, cachetic ,not in any distress ,  HEAD:  Atraumatic, Normocephalic  EYES: EOMI, PERRLA, conjunctiva and sclera clear  ENMT: No tonsillar erythema, exudates, or enlargement; Moist mucous membranes, Good dentition, No lesions  NECK: Supple, No JVD, Normal thyroid  NERVOUS SYSTEM:  Alert & Oriented X3, No focal deficit   CHEST/LUNG: Good air entry bilateral with no  rales, rhonchi, wheezing, or rubs  HEART: Regular rate and rhythm; No murmurs, rubs, or gallops  ABDOMEN: Soft, Nontender, Nondistended; Bowel sounds present  EXTREMITIES:  2+ Peripheral Pulses, No clubbing, cyanosis, or edema      Care Discussed with Consultants/Other Providers [ x] YES  [ ] NO

## 2022-12-13 NOTE — PROGRESS NOTE ADULT - PROBLEM SELECTOR PLAN 6
hx of vaginal bleeding, declined obgyn assessment at [facility]  Hx of bladder cancer likely causing bleeding   pt seen by GYN at [facility]: no intervention possible at this time..

## 2022-12-13 NOTE — PROGRESS NOTE ADULT - ASSESSMENT
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      Problem/Plan - 1:  ·  Problem: dyspnea.   ·  Plan: Chest xray showing metastatic disease unchanged from prior : most likely cause of dyspnea ( although anemia contributing)   high risk for DVT/PE but with contraindication for AC due to active chronic vaginal bleeding due to cancer and patient has been saturating well and not tachycardic   home medication morphine for dyspnea and pain.  as above asking for home O2 and trying to set up on her own , stating she was approved for it before !!      Problem/Plan - 2:  ·  Problem: Anemia.   post 2 Units PRBC with good results   f/u CBC post transfusion  hem following       12-02-22 @ 05:45  Iron:     16 ug/dL  Iron %:   9 %  TIBC:     180 ug/dL  iron supplement   ( could not get IV iron as does not have IV access)     *** unclear cause of leukocytosis ... ? reactive    monitor for now     improved      Problem/Plan - 3:  ·  Problem: Vaginal bleeding.   ·  Plan: hx of vaginal bleeding, declined obgyn assessment at [facility]  Hx of bladder cancer likely causing bleeding   pt seen by GYN at [facility]: no intervention possible at this time..   pt will need treatment for cancer      Problem/Plan - 4:  ·  Problem: Hyponatremia. improved  encourage PO intake      Problem/Plan - 5:  ·  Problem: Bladder cancer.   ·  Plan: Hx of Bladder Ca with mets  last chemotherapy in may of 2022.  hem onc following  palliative appreciated   continue current pain mgmt  patient very resistant in trying new medications    **nephrostomy tube overdue for exchange>>> changed by IR     worsened leukocytosis post pricedure / likely reactive ?     ** pt being treated by ID for recurrent UTIS !      monitor WBC    ** hypercalcemia     appreciated Hem onc eval    pt declined treatment but ended up taking the bisphosphonates      monitor     Problem/Plan - 6:  ·  Problem: Prophylactic measure.   ·  Plan: SCD  PPI.  S/S eval appreciated  patient declined taking antiacid meds / famotidine / PPI    Dispo planing home  patient not interested in hospice   supportive care   OP Onc follow up   patient medically stable for DC

## 2022-12-14 NOTE — PROGRESS NOTE ADULT - PROBLEM SELECTOR PLAN 6
hx of vaginal bleeding, declined obgyn assessment at [facility]  Hx of bladder cancer likely causing bleeding   pt seen by GYN at [facility]: no intervention possible at this time..

## 2022-12-14 NOTE — PROGRESS NOTE ADULT - ASSESSMENT
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      Problem/Plan - 1:  ·  Problem: dyspnea.   ·  Plan: Chest xray showing metastatic disease unchanged from prior : most likely cause of dyspnea ( although anemia contributing)   high risk for DVT/PE but with contraindication for AC due to active chronic vaginal bleeding due to cancer and patient has been saturating well and not tachycardic   home medication morphine for dyspnea and pain.  as above asking for home O2 and trying to set up on her own , stating she was approved for it before !!      Problem/Plan - 2:  ·  Problem: Anemia.   post 2 Units PRBC with good results   f/u CBC post transfusion  hem following       12-02-22 @ 05:45  Iron:     16 ug/dL  Iron %:   9 %  TIBC:     180 ug/dL  iron supplement   ( could not get IV iron as does not have IV access)     *** unclear cause of leukocytosis ... ? reactive    monitor for now     improved      Problem/Plan - 3:  ·  Problem: Vaginal bleeding.   ·  Plan: hx of vaginal bleeding, declined obgyn assessment at [facility]  Hx of bladder cancer likely causing bleeding   pt seen by GYN at [facility]: no intervention possible at this time..   pt will need treatment for cancer      Problem/Plan - 4:  ·  Problem: Hyponatremia. improved  encourage PO intake      Problem/Plan - 5:  ·  Problem: Bladder cancer.   ·  Plan: Hx of Bladder Ca with mets  last chemotherapy in may of 2022.  hem onc following  palliative appreciated   continue current pain mgmt  patient very resistant in trying new medications    **nephrostomy tube overdue for exchange>>> changed by IR     worsened leukocytosis post pricedure / likely reactive ?     ** pt being treated by ID for recurrent UTIS !      monitor WBC    ** hypercalcemia     appreciated Hem onc eval    pt declined treatment but ended up taking the bisphosphonates      monitor     Problem/Plan - 6:  ·  Problem: Prophylactic measure.   ·  Plan: SCD  PPI.  S/S eval appreciated  patient declined taking antiacid meds / famotidine / PPI    Dispo planing home  patient not interested in hospice   supportive care   OP Onc follow up   patient medically stable for DC

## 2022-12-14 NOTE — PROGRESS NOTE ADULT - ASSESSMENT
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 Patient also endorses vaginal bleeding for which she was evaluated by OBGYN at [facility] and was recommended further work up including TVUS but patient refused. Pt has a chronic nephrostomy tube that was placed at [facility] for right hydronephrosis. She follows with Dr Kan at [facility]. Pt last received chemotherapy in may of 2022. Patient admitted to medicine for dyspnea, Chest x-ray showing metastatic disease unchanged from prior. Patient at high risk for DVT/PE but with contraindication for AC due to active chronic vaginal bleeding due to cancer and patient has been saturating well and not tachycardic, home medication morphine for dyspnea and pain.   ID, Hem/onc and pain consulted. Patient admission complicated by acute anemia. Pt transfused 2U PRBC with a good response. Palliative consulted for conversation about hospice due to advanced metastatic cancer, patient refused services at this time and wants to f/o with her new oncologist after discharge. Patient c/o left foot swelling and pain, unsure if something dropped on her foot?  X-ray of left foot performed pending official reading. CM onboard for discharge planning. Patient medically cleared for discharge.

## 2022-12-14 NOTE — PROGRESS NOTE ADULT - ASSESSMENT
· Assessment	  JEF HOUSE is a 56y Female who presents with a chief complaint of dyspnea.    Metastatic Bladder Cancer  - Patient previously followed with [facility] and at [facility]. Now in process of switching to [facility].  - Last chemotherapy was in May 2022. She never had immunotherapy  - Last imaging in October 2022 had shown worsening pulmonary and liver metastases.   - No systemic therapy while inpatient.  - Consult palliative care for goals of care evaluation. Patient reports that she has upcoming appointment with new oncologist on December 16th  - Pain medicine evaluation noted. Optimize pain control.     Anemia- Patient presented with severe anemia; required blood transfusions.  - In the setting of active malignancy. Noted decreased transferrin saturation indicating iron deficiency as well.  - Can administer oral iron.  - Monitor CBC and transfuse to maintain hemoglobin > 7.    Hypercalcemia  - Corrected calcium today around 13.3  - Continue to monitor. Patient had received one dose of pamidronate  - IVF. Obtain PTH and PTHrP.   - Consider endocrinology evaluation.   constipation, will give MOM    Dyspnea  - Patient refused CTA. Ultrasound did not show any DVT.  - Not on supplemental oxygen.    Will continue to follow.

## 2022-12-14 NOTE — PROGRESS NOTE ADULT - SUBJECTIVE AND OBJECTIVE BOX
Date of Service  : 12-14-22   INTERVAL HPI/OVERNIGHT EVENTS:  Still have pain.   Vital Signs Last 24 Hrs  T(C): 36.9 (14 Dec 2022 14:51), Max: 36.9 (14 Dec 2022 14:51)  T(F): 98.4 (14 Dec 2022 14:51), Max: 98.4 (14 Dec 2022 14:51)  HR: 90 (14 Dec 2022 14:51) (63 - 90)  BP: 96/66 (14 Dec 2022 14:51) (96/66 - 98/61)  BP(mean): --  RR: 17 (14 Dec 2022 14:51) (17 - 18)  SpO2: 99% (14 Dec 2022 14:51) (95% - 99%)    Parameters below as of 14 Dec 2022 14:51  Patient On (Oxygen Delivery Method): nasal cannula      I&O's Summary    14 Dec 2022 07:01  -  14 Dec 2022 21:38  --------------------------------------------------------  IN: 0 mL / OUT: 300 mL / NET: -300 mL      MEDICATIONS  (STANDING):  enoxaparin Injectable 40 milliGRAM(s) SubCutaneous every 24 hours  ferrous    sulfate 325 milliGRAM(s) Oral daily  folic acid 1 milliGRAM(s) Oral daily  lactulose Syrup 15 Gram(s) Oral every other day  lidocaine   4% Patch 1 Patch Transdermal every 24 hours  pantoprazole    Tablet 40 milliGRAM(s) Oral every 12 hours  polyethylene glycol 3350 17 Gram(s) Oral daily  senna 2 Tablet(s) Oral at bedtime    MEDICATIONS  (PRN):  acetaminophen     Tablet .. 325 milliGRAM(s) Oral every 4 hours PRN Moderate Pain (4 - 6)  aluminum hydroxide/magnesium hydroxide/simethicone Suspension 30 milliLiter(s) Oral every 4 hours PRN Dyspepsia  bisacodyl Suppository 10 milliGRAM(s) Rectal daily PRN Constipation  diphenhydrAMINE 25 milliGRAM(s) Oral every 4 hours PRN Itching  morphine   Solution 5 milliGRAM(s) Oral every 3 hours PRN Severe Pain (7 - 10)  simethicone 80 milliGRAM(s) Chew every 6 hours PRN Gas    LABS:                        9.0    12.15 )-----------( 332      ( 13 Dec 2022 07:20 )             29.7     12-13    135  |  99  |  17  ----------------------------<  101<H>  4.1   |  26  |  0.81    Ca    11.0<H>      13 Dec 2022 07:20    TPro  7.1  /  Alb  1.8<L>  /  TBili  0.3  /  DBili  x   /  AST  22  /  ALT  17  /  AlkPhos  230<H>  12-13        CAPILLARY BLOOD GLUCOSE                  Consultant(s) Notes Reviewed:  [x ] YES  [ ] NO    PHYSICAL EXAM:  GENERAL: NAD, cachetic   HEAD:  Atraumatic, Normocephalic  NECK: Supple, No JVD, Normal thyroid  NERVOUS SYSTEM:  Alert & Oriented X3, No focal deficit   CHEST/LUNG: Good air entry bilateral with no  rales, rhonchi, wheezing, or rubs  HEART: Regular rate and rhythm; No murmurs, rubs, or gallops  ABDOMEN: Soft, Nontender, Nondistended; Bowel sounds present  EXTREMITIES:  2+ Peripheral Pulses, No clubbing, cyanosis, or edema    Care Discussed with Consultants/Other Providers [ x] YES  [ ] NO

## 2022-12-14 NOTE — PROGRESS NOTE ADULT - SUBJECTIVE AND OBJECTIVE BOX
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 Patient states that she developed sudden onset dyspnea 2 days ago at rest. The dyspnoea is not alleviated or worsened by any factors. Pt also endorses vaginal bleeding for which she was evaluated by OBGYN at [facility] and was recommended further work up including TVUS but patient refused. Patient denies any chest pain. She was recently admitted at [facility] in October 2022 for hemoptysis and CT chest at that time showed Numerous bilateral pulmonary metastases; mild interval increase in size of a few of the largest pulmonary metastases compared to 9/20/202. Hospital course at that time was complicated by a drop in Hb for which she received blood transfusion. She also tested positive for COVID 19 but was asymptomatic. Pt has a chronic nephrostomy tube that was placed at [facility] for right hydronephrosis. She follows with Dr Kan at [facility]. Pt last recieved chemotherapy in may of 2022. She denies any fevers, chest pain, abd pain.    In ED VS: T 98, Hb 7.8, WBC 15, Na 132   (01 Dec 2022 19:07)     Pt is seen and examined  pt is awake and lying in bed/out of bed to chair  pt seems comfortable and denies any complaints at this time    ROS:  Negative except for:    MEDICATIONS  (STANDING):  enoxaparin Injectable 40 milliGRAM(s) SubCutaneous every 24 hours  ferrous    sulfate 325 milliGRAM(s) Oral daily  folic acid 1 milliGRAM(s) Oral daily  lactulose Syrup 15 Gram(s) Oral every other day  lidocaine   4% Patch 1 Patch Transdermal every 24 hours  magnesium hydroxide Suspension 30 milliLiter(s) Oral once  pantoprazole    Tablet 40 milliGRAM(s) Oral every 12 hours  polyethylene glycol 3350 17 Gram(s) Oral daily  senna 2 Tablet(s) Oral at bedtime    MEDICATIONS  (PRN):  acetaminophen     Tablet .. 325 milliGRAM(s) Oral every 4 hours PRN Moderate Pain (4 - 6)  aluminum hydroxide/magnesium hydroxide/simethicone Suspension 30 milliLiter(s) Oral every 4 hours PRN Dyspepsia  bisacodyl Suppository 10 milliGRAM(s) Rectal daily PRN Constipation  diphenhydrAMINE 25 milliGRAM(s) Oral every 4 hours PRN Itching  morphine   Solution 5 milliGRAM(s) Oral every 3 hours PRN Severe Pain (7 - 10)  simethicone 80 milliGRAM(s) Chew every 6 hours PRN Gas      Allergies    No Known Allergies    Intolerances        Vital Signs Last 24 Hrs  T(C): 36.6 (14 Dec 2022 04:51), Max: 36.8 (13 Dec 2022 15:29)  T(F): 97.8 (14 Dec 2022 04:51), Max: 98.3 (13 Dec 2022 15:29)  HR: 63 (14 Dec 2022 04:51) (63 - 86)  BP: 98/61 (14 Dec 2022 04:51) (93/58 - 98/61)  BP(mean): --  RR: 18 (14 Dec 2022 04:51) (18 - 18)  SpO2: 95% (14 Dec 2022 04:51) (95% - 100%)    Parameters below as of 14 Dec 2022 04:51  Patient On (Oxygen Delivery Method): room air        PHYSICAL EXAM  General: adult in NAD  HEENT: clear oropharynx, anicteric sclera, pink conjunctiva  Neck: supple  CV: normal S1/S2 with no murmur rubs or gallops  Lungs: positive air movement b/l ant lungs,clear to auscultation, no wheezes, no rales  Abdomen: soft non-tender non-distended, no hepatosplenomegaly  Ext: no clubbing cyanosis or edema  Skin: no rashes and no petechiae  Neuro: alert and oriented X 4, no focal deficits  LABS:                          9.0    12.15 )-----------( 332      ( 13 Dec 2022 07:20 )             29.7         Mean Cell Volume : 87.9 fl  Mean Cell Hemoglobin : 26.6 pg  Mean Cell Hemoglobin Concentration : 30.3 gm/dL  Auto Neutrophil # : x  Auto Lymphocyte # : x  Auto Monocyte # : x  Auto Eosinophil # : x  Auto Basophil # : x  Auto Neutrophil % : x  Auto Lymphocyte % : x  Auto Monocyte % : x  Auto Eosinophil % : x  Auto Basophil % : x    Serial CBC  Hematocrit 29.7  Hemoglobin 9.0  Plat 332  RBC 3.38  WBC 12.15  Serial CBC  Hematocrit 29.1  Hemoglobin 8.8  Plat 336  RBC 3.35  WBC 12.09  Serial CBC  Hematocrit 31.0  Hemoglobin 9.5  Plat 364  RBC 3.54  WBC 13.47  Serial CBC  Hematocrit 29.5  Hemoglobin 8.8  Plat 334  RBC 3.34  WBC 12.90    12-13    135  |  99  |  17  ----------------------------<  101<H>  4.1   |  26  |  0.81    Ca    11.0<H>      13 Dec 2022 07:20    TPro  7.1  /  Alb  1.8<L>  /  TBili  0.3  /  DBili  x   /  AST  22  /  ALT  17  /  AlkPhos  230<H>  12-13                    BLOOD SMEAR INTERPRETATION:       RADIOLOGY & ADDITIONAL STUDIES:

## 2022-12-14 NOTE — PROGRESS NOTE ADULT - SUBJECTIVE AND OBJECTIVE BOX
Patient is a 56y old  Female who presents with a chief complaint of dyspnea (14 Dec 2022 09:33)      INTERVAL HPI/OVERNIGHT EVENTS: no overnight events    I&O's Summary    Vital Signs Last 24 Hrs  T(C): 36.6 (14 Dec 2022 04:51), Max: 36.8 (13 Dec 2022 15:29)  T(F): 97.8 (14 Dec 2022 04:51), Max: 98.3 (13 Dec 2022 15:29)  HR: 63 (14 Dec 2022 04:51) (63 - 86)  BP: 98/61 (14 Dec 2022 04:51) (93/58 - 98/61)  BP(mean): --  RR: 18 (14 Dec 2022 04:51) (18 - 18)  SpO2: 95% (14 Dec 2022 04:51) (95% - 100%)    Parameters below as of 14 Dec 2022 04:51  Patient On (Oxygen Delivery Method): room air      PAST MEDICAL & SURGICAL HISTORY:  Anemia      Asthma      HLD (hyperlipidemia)      Pacemaker  [employer]      Bladder cancer      HTN (hypertension)      Parathyroid tumor      Liver cyst      Hydronephrosis  has right Nephrostomy tube - dressing intact      Bradycardia      History of renal calculi      Birthmark      H/O myomectomy      Presence of cardiac pacemaker      H/O hydronephrosis  s/p Right Perc nephrostomy tube placement 02/2021, exchange 06/2021          SOCIAL HISTORY  Alcohol:  Tobacco:  Illicit substance use:      FAMILY HISTORY:      LABS:                        9.0    12.15 )-----------( 332      ( 13 Dec 2022 07:20 )             29.7     12-13    135  |  99  |  17  ----------------------------<  101<H>  4.1   |  26  |  0.81    Ca    11.0<H>      13 Dec 2022 07:20    TPro  7.1  /  Alb  1.8<L>  /  TBili  0.3  /  DBili  x   /  AST  22  /  ALT  17  /  AlkPhos  230<H>  12-13        CAPILLARY BLOOD GLUCOSE        MEDICATIONS  (STANDING):  enoxaparin Injectable 40 milliGRAM(s) SubCutaneous every 24 hours  ferrous    sulfate 325 milliGRAM(s) Oral daily  folic acid 1 milliGRAM(s) Oral daily  lactulose Syrup 15 Gram(s) Oral every other day  lidocaine   4% Patch 1 Patch Transdermal every 24 hours  magnesium hydroxide Suspension 30 milliLiter(s) Oral once  pantoprazole    Tablet 40 milliGRAM(s) Oral every 12 hours  polyethylene glycol 3350 17 Gram(s) Oral daily  senna 2 Tablet(s) Oral at bedtime    MEDICATIONS  (PRN):  acetaminophen     Tablet .. 325 milliGRAM(s) Oral every 4 hours PRN Moderate Pain (4 - 6)  aluminum hydroxide/magnesium hydroxide/simethicone Suspension 30 milliLiter(s) Oral every 4 hours PRN Dyspepsia  bisacodyl Suppository 10 milliGRAM(s) Rectal daily PRN Constipation  diphenhydrAMINE 25 milliGRAM(s) Oral every 4 hours PRN Itching  morphine   Solution 5 milliGRAM(s) Oral every 3 hours PRN Severe Pain (7 - 10)  simethicone 80 milliGRAM(s) Chew every 6 hours PRN Gas      REVIEW OF SYSTEMS:  CONSTITUTIONAL: No fever  EYES: No eye pain  ENMT:   No sinus or throat pain  NECK: No pain   RESPIRATORY: + shortness of breath  CARDIOVASCULAR: No chest pain  GASTROINTESTINAL: +constipation.   GENITOURINARY: No dysuria  NEUROLOGICAL: No headaches  SKIN: No rashes  MUSCULOSKELETAL: + left joint pain or swelling    RADIOLOGY & ADDITIONAL TESTS:    < from: Xray Chest 1 View- PORTABLE-Urgent (Xray Chest 1 View- PORTABLE-Urgent .) (12.01.22 @ 18:12) >    ACC: [id number] EXAM:  XR CHEST PORTABLE URGENT 1V                          PROCEDURE DATE:  12/01/2022          INTERPRETATION:  Short of breath.    AP chest. Prior 10/25/2022.    Left ICD. Normal heart mediastinum. Hyperinflated lungs. Multiple   parenchymal nodules of varying sizes reidentified similar to prior   consistent with metastatic disease. No acute infiltrate pleural effusion   or pneumothorax.    IMPRESSION: No acute infiltrate. pulmonary metastatic nodules similar to   prior    --- End of Report ---    GERA PIKE MD; Attending Radiologist  This document has been electronically signed. Dec  2 2022  9:48AM    < end of copied text >    ACC: [id number] EXAM:  US DPLX LWR EXT VEINS COMPL BI                          PROCEDURE DATE:  12/04/2022          INTERPRETATION:  CLINICAL INFORMATION: Bilateral leg pain.    COMPARISON: None available.    TECHNIQUE: Duplex sonography of the BILATERAL LOWER extremity veins with   color and spectral Doppler, with and without compression.    FINDINGS:    RIGHT:  Normal compressibility of the RIGHT common femoral, femoral and popliteal   veins.  Doppler examination shows normal spontaneous and phasic flow.  No RIGHT calf vein thrombosis is detected.    LEFT:  Normal compressibility of the LEFT common femoral, femoral and popliteal   veins.  Doppler examination shows normal spontaneous and phasic flow.  No LEFT calf vein thrombosis is detected.    IMPRESSION:  No evidence of deep venous thrombosis in either lower extremity.      --- End of Report ---    LAUREN GUERRERO MD; Attending Radiologist  This document has been electronically signed. Dec  4 2022  1:28PM    ACC: [id number] EXAM:  XR FOOT 2 VIEWS LT                          PROCEDURE DATE:  12/12/2022          INTERPRETATION:  Left foot    HISTORY: Pain and swelling     Three views of the left foot show no evidence of fracture nor   destructive change. The joint spaces are maintained.    IMPRESSION: No acute bony pathology.        Thank you for this referral.    --- End of Report ---    RAMOS KINCAID MD; Attending Interventional Radiologist  This document has been electronically signed. Dec 13 2022  4:09PM    Imaging Personally Reviewed:  [ x] YES  [ ] NO    Consultant(s) Notes Reviewed:  [x ] YES  [ ] NO    PHYSICAL EXAM:  GENERAL: NAD  HEAD:  Atraumatic, Normocephalic  EYES:  conjunctiva and sclera clear  ENMT: Moist mucous membranes  NECK: Supple  NERVOUS SYSTEM:  Alert & Oriented X3  CHEST/LUNG: CTA   HEART: Regular rate and rhythm  ABDOMEN: +distended  EXTREMITIES:  2+ Peripheral Pulses  SKIN: No rashes     Care Collaborated Discussed with Consultants/Other Providers [x ] YES  [ ] NO

## 2022-12-15 NOTE — PROGRESS NOTE ADULT - ASSESSMENT
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 Patient also endorses vaginal bleeding for which she was evaluated by OBGYN at [facility] and was recommended further work up including TVUS but patient refused. Pt has a chronic nephrostomy tube that was placed at [facility] for right hydronephrosis. She follows with Dr Kan at [facility]. Pt last received chemotherapy in may of 2022. Patient admitted to medicine for dyspnea, Chest x-ray showing metastatic disease unchanged from prior. Patient at high risk for DVT/PE but with contraindication for AC due to active chronic vaginal bleeding due to cancer and patient has been saturating well and not tachycardic, home medication morphine for dyspnea and pain.   ID, Hem/onc and pain consulted. Patient admission complicated by acute anemia. Pt transfused 2U PRBC with a good response. Palliative consulted for conversation about hospice due to advanced metastatic cancer, patient refused services at this time and wants to f/o with her new oncologist after discharge. Patient c/o left foot swelling and pain, unsure if something dropped on her foot?  X-ray of left foot performed pending official reading. CM onboard for discharge planning. Patient medically cleared for discharge.    12/15- patient seen and examined at bedside, c/o chronic pain, pain management is following, Patient refusing to leave, discharge notice CM and SW is following.  Patient medically cleared for discharge.

## 2022-12-15 NOTE — PROGRESS NOTE ADULT - SUBJECTIVE AND OBJECTIVE BOX
pt seen, complaining of persistent vaginal bleeding, bright red blood    MEDICATIONS  (STANDING):  enoxaparin Injectable 40 milliGRAM(s) SubCutaneous every 24 hours  ferrous    sulfate 325 milliGRAM(s) Oral daily  folic acid 1 milliGRAM(s) Oral daily  lactulose Syrup 15 Gram(s) Oral every other day  lidocaine   4% Patch 1 Patch Transdermal every 24 hours  pantoprazole    Tablet 40 milliGRAM(s) Oral every 12 hours  polyethylene glycol 3350 17 Gram(s) Oral daily  senna 2 Tablet(s) Oral at bedtime    MEDICATIONS  (PRN):  acetaminophen     Tablet .. 325 milliGRAM(s) Oral every 4 hours PRN Moderate Pain (4 - 6)  aluminum hydroxide/magnesium hydroxide/simethicone Suspension 30 milliLiter(s) Oral every 4 hours PRN Dyspepsia  bisacodyl Suppository 10 milliGRAM(s) Rectal daily PRN Constipation  diphenhydrAMINE 25 milliGRAM(s) Oral every 4 hours PRN Itching  morphine   Solution 5 milliGRAM(s) Oral every 3 hours PRN Severe Pain (7 - 10)  simethicone 80 milliGRAM(s) Chew every 6 hours PRN Gas      ROS  as above, limited 2/2 participation    Vital Signs Last 24 Hrs  T(C): 37.1 (15 Dec 2022 13:17), Max: 37.1 (15 Dec 2022 13:17)  T(F): 98.8 (15 Dec 2022 13:17), Max: 98.8 (15 Dec 2022 13:17)  HR: 89 (15 Dec 2022 13:17) (69 - 89)  BP: 100/60 (15 Dec 2022 13:17) (93/59 - 100/60)  BP(mean): --  RR: 17 (15 Dec 2022 13:17) (17 - 17)  SpO2: 100% (15 Dec 2022 13:17) (97% - 100%)    Parameters below as of 15 Dec 2022 13:17  Patient On (Oxygen Delivery Method): nasal cannula  O2 Flow (L/min): 2      PE  NAD  Awake, alert  Anicteric  cachectic  limited 2/2 participation                          9.0    13.34 )-----------( 380      ( 15 Dec 2022 09:59 )             30.4

## 2022-12-15 NOTE — CHART NOTE - NSCHARTNOTEFT_GEN_A_CORE
Assessment:   56yFemalePatient is a 56y old  Female who presents with a chief complaint of dyspnea (15 Dec 2022 10:14). Pt visited. Pt is on O2. Pt Reports Ensure clear gives Gas. Pt requesting for Regular Ensure but not ensure Plus. Pt prefers Strawberry and Chocolate flavour.  Pt states "  I am trying my best " to eat. Pt is followed by Palliative care.      Factors impacting intake: [ ] none [ ] nausea  [ ] vomiting [ ] diarrhea [ ] constipation  [ ]chewing problems [ ] swallowing issues  [ ] other:     Diet Prescription: Diet, Regular:   Supplement Feeding Modality:  Oral  Ensure Enlive Cans or Servings Per Day:  1       Frequency:  Three Times a day (12-09-22 @ 10:52)    Intake: < 50 %     Current Weight:   % Weight Change    Pertinent Medications: MEDICATIONS  (STANDING):  enoxaparin Injectable 40 milliGRAM(s) SubCutaneous every 24 hours  ferrous    sulfate 325 milliGRAM(s) Oral daily  folic acid 1 milliGRAM(s) Oral daily  lactulose Syrup 15 Gram(s) Oral every other day  lidocaine   4% Patch 1 Patch Transdermal every 24 hours  pantoprazole    Tablet 40 milliGRAM(s) Oral every 12 hours  polyethylene glycol 3350 17 Gram(s) Oral daily  senna 2 Tablet(s) Oral at bedtime    MEDICATIONS  (PRN):  acetaminophen     Tablet .. 325 milliGRAM(s) Oral every 4 hours PRN Moderate Pain (4 - 6)  aluminum hydroxide/magnesium hydroxide/simethicone Suspension 30 milliLiter(s) Oral every 4 hours PRN Dyspepsia  bisacodyl Suppository 10 milliGRAM(s) Rectal daily PRN Constipation  diphenhydrAMINE 25 milliGRAM(s) Oral every 4 hours PRN Itching  morphine   Solution 5 milliGRAM(s) Oral every 3 hours PRN Severe Pain (7 - 10)  simethicone 80 milliGRAM(s) Chew every 6 hours PRN Gas    Pertinent Labs: 12-13 Na135 mmol/L Glu 101 mg/dL<H> K+ 4.1 mmol/L Cr  0.81 mg/dL BUN 17 mg/dL 12-13 Alb 1.8 g/dL<L>     CAPILLARY BLOOD GLUCOSE        Skin:     Estimated Needs:   [ ] no change since previous assessment  [ ] recalculated:     Previous Nutrition Diagnosis:   [ ] Inadequate Energy Intake [ ]Inadequate Oral Intake [ ] Excessive Energy Intake   [ ] Underweight [ ] Increased Nutrient Needs [ ] Overweight/Obesity   [ ] Altered GI Function [ ] Unintended Weight Loss [ ] Food & Nutrition Related Knowledge Deficit [ x] Malnutrition     Nutrition Diagnosis is [ x] ongoing  [ ] resolved [ ] not applicable     New Nutrition Diagnosis: [ ] not applicable       Interventions:   Recommend  [ ] Change Diet To:  [x ] Nutrition Supplement Continue with  Ensure Enlive TID ( Likes Strawberry or  chocolate ) Flavour   [ ] Nutrition Support  [x ] Other: Provide Food of choice.     Monitoring and Evaluation:   [ ] PO intake [ x ] Tolerance to diet prescription [ x ] weights [ x ] labs[ x ] follow up per protocol  [ ] other:

## 2022-12-15 NOTE — PROGRESS NOTE ADULT - ASSESSMENT
· Assessment	  JEF HOUSE is a 56y Female who presents with a chief complaint of dyspnea.    Metastatic Bladder Cancer  - Patient previously followed with [facility] and at [facility]. Now in process of switching to [facility].  - Last chemotherapy was in May 2022. She never had immunotherapy  - Last imaging in October 2022 had shown worsening pulmonary and liver metastases.   - No systemic therapy while inpatient.  - Consult palliative care for goals of care evaluation. Patient reports that she has upcoming appointment with new oncologist on December 16th  - Pain medicine evaluation noted. Optimize pain control.     Anemia- Patient presented with severe anemia; required blood transfusions.  - In the setting of active malignancy. Noted decreased transferrin saturation indicating iron deficiency as well.  - Can administer oral iron.  - Monitor CBC and transfuse to maintain hemoglobin > 7.    Hypercalcemia  - Corrected calcium today around 13.3  - Continue to monitor. Patient had received one dose of pamidronate  - IVF. Obtain PTH and PTHrP.   - Consider endocrinology evaluation.   constipation, will give MOM    Dyspnea  - Patient refused CTA. Ultrasound did not show any DVT.  - Not on supplemental oxygen.    vaginal bleeding -- ? related to bladder mass  - monitor, hgb stable    Will continue to follow.   · Assessment	  JEF HOUSE is a 56y Female who presents with a chief complaint of dyspnea.    Metastatic Bladder Cancer  - Patient previously followed with [facility] and at [facility]. Now in process of switching to [facility].  - Last chemotherapy was in May 2022. She never had immunotherapy  - Last imaging in October 2022 had shown worsening pulmonary and liver metastases.   - No systemic therapy while inpatient.  - Consult palliative care for goals of care evaluation. Patient reports that she has upcoming appointment with new oncologist on December 16th  - Pain medicine evaluation noted. Optimize pain control.     Anemia- Patient presented with severe anemia; required blood transfusions.  - In the setting of active malignancy. Noted decreased transferrin saturation indicating iron deficiency as well.  - Can administer oral iron.  - Monitor CBC and transfuse to maintain hemoglobin > 7.    Hypercalcemia  - ca stable  - Continue to monitor. Patient had received one dose of pamidronate  - IVF. Obtain PTH and PTHrP.   - Consider endocrinology evaluation.   constipation, will give MOM    Dyspnea  - Patient refused CTA. Ultrasound did not show any DVT.  - Not on supplemental oxygen.    vaginal bleeding -- ? related to bladder mass  - monitor, hgb stable    Will continue to follow.

## 2022-12-15 NOTE — PROGRESS NOTE ADULT - ASSESSMENT
Confidential Drug Utilization Report  Search Terms: Zoë Bell, 1965Search Date: 12/05/2022 09:13:40 AM  The Drug Utilization Report below displays all of the controlled substance prescriptions, if any, that your patient has filled in the last twelve months. The information displayed on this report is compiled from pharmacy submissions to the Department, and accurately reflects the information as submitted by the pharmacies.    This report was requested by: Pat Mora | Reference #: [id number]    You have not added a ABI number. Keeping your ABI number(s) up to date on the My ABI # page will enable the separation of your prescriptions from others in the search results.    Others' Prescriptions  Patient Name: Zoë BellBirth Date: 1965  Address: [address] [place], [place] [address]Sxb: Female  Rx Written	Rx Dispensed	Drug	Quantity	Days Supply	Prescriber Name	Prescriber Abi #	Payment Method	Dispenser  11/26/2022	11/26/2022	morphine sulf 10 mg/5 ml soln	430ml	20	Duke Meng M	[id number]	[id number]  11/15/2022	11/21/2022	morphine sulf 10 mg/5 ml soln	75ml	5	Ash Garcia	[id number]	Medicare	[id number]    Patient Name: Zoë BellBirth Date: 1965  Address: [address] [place], [place] [address]Jpj: Female  Rx Written	Rx Dispensed	Drug	Quantity	Days Supply	Prescriber Name	Prescriber Abi #	Payment Method	Dispenser  11/15/2022	11/15/2022	morphine sulf 10 mg/5 ml soln	75ml	5	Ash Garcia	[id number]	[facility]    Patient Name: Zoë BellBirth Date: 1965  Address: [address] [facility] [address] [place], [place] [address]Nid: Female  Rx Written	Rx Dispensed	Drug	Quantity	Days Supply	Prescriber Name	Prescriber Abi #	Payment Method	Dispenser  10/04/2022	10/15/2022	tramadol hcl 50 mg tablet	10	5	Jai Hughes [id number]	Medicare	[facility] [id number]  08/26/2022	09/01/2022	tramadol hcl 50 mg tablet	20	5	Timi Patel	[id number]	Medicare	[facility] [id number]  08/26/2022	09/01/2022	diazepam 5 mg tablet	15	5	Timi Patel	[id number]	Medicare	[facility] [id number]  * - Drugs marked with an asterisk are compound drugs. If the compound drug is made up of more than one controlled substance, then each controlled substance will be a separate row in the table.

## 2022-12-15 NOTE — PROGRESS NOTE ADULT - ASSESSMENT
Is This A New Presentation, Or A Follow-Up?: Skin Lesions Patient is a 56y old  Female with PMHx of Asthma, Anemia, Bladder cancer with metastases to the lungs, Bradycardia, Hypertension, Dyslipidemia, Hydronephrosis, R-nephrostomy tube, Liver cyst, Parathyroid tumor, Chronic pain and PPM in-situ, presents to the ER on 12/1/22, for evaluation of SOB for 2 days. Patient states that she developed sudden onset dyspnea 2 days ago at rest. She was recently admitted at [facility] in October 2022 for hemoptysis and CT chest at that time showed Numerous bilateral pulmonary metastases; mild interval increase in size of a few of the largest pulmonary metastases compared to 9/20/202. Hospital course at that time was complicated by a drop in Hb for which she received blood transfusion. She also tested positive for COVID 19 but was asymptomatic. Pt has a chronic nephrostomy tube that was placed at [facility] for right hydronephrosis. She follows with Dr Kan at [facility]. Pt last received chemotherapy in may of 2022.  The ID consult requested today, 12/3/22, to assist with evaluation of UTI.    # Recurrent  UTIs- Urine cx form 12/4 has no growth   # Leukocytosis -resolving  # Metastatic Bladder cancer - s/p  chemo on May, 2022    would recommend:    1. Monitor OFF Abx as she completed the course   2. Bowel regimen for constipation  3. Monitor WBC count, is trending down  4. Pain management as needed  5.  Monitor kidney function     d/w patient and the Primary team    Attending Attestation:    Spent more than 35 minutes on total encounter, more than 50 % of the visit was spent counseling and/or coordinating care by the Attending physician.

## 2022-12-15 NOTE — PROGRESS NOTE ADULT - PROBLEM SELECTOR PROBLEM 3
Chronic abdominal pain calorie ~1484-1767kcal   1264-1475kcal (60-70% PSE 2010) vs. 1474-1876kcal (11-14kcal/kg CBW) vs. 1716-1950kcal (22-25kcal/kg IBW) for intubation/obesity   protein 125-140g (1.6-1.8g/kg IBW) for obesity, CKD, intubation- will monitor renal profile/adjust PRN    fluid per CCU team

## 2022-12-15 NOTE — PROGRESS NOTE ADULT - SUBJECTIVE AND OBJECTIVE BOX
NP Note discussed with Primary Provider.    Patient is a 56y old  Female who presents with a chief complaint of dyspnea (15 Dec 2022 15:11)      INTERVAL HPI/OVERNIGHT EVENTS: no new complaints    MEDICATIONS  (STANDING):  enoxaparin Injectable 40 milliGRAM(s) SubCutaneous every 24 hours  ferrous    sulfate 325 milliGRAM(s) Oral daily  folic acid 1 milliGRAM(s) Oral daily  lactulose Syrup 15 Gram(s) Oral every other day  lidocaine   4% Patch 1 Patch Transdermal every 24 hours  pantoprazole    Tablet 40 milliGRAM(s) Oral every 12 hours  polyethylene glycol 3350 17 Gram(s) Oral daily  senna 2 Tablet(s) Oral at bedtime    MEDICATIONS  (PRN):  acetaminophen     Tablet .. 325 milliGRAM(s) Oral every 4 hours PRN Moderate Pain (4 - 6)  aluminum hydroxide/magnesium hydroxide/simethicone Suspension 30 milliLiter(s) Oral every 4 hours PRN Dyspepsia  bisacodyl Suppository 10 milliGRAM(s) Rectal daily PRN Constipation  diphenhydrAMINE 25 milliGRAM(s) Oral every 4 hours PRN Itching  morphine   Solution 5 milliGRAM(s) Oral every 3 hours PRN Severe Pain (7 - 10)  simethicone 80 milliGRAM(s) Chew every 6 hours PRN Gas      __________________________________________________  REVIEW OF SYSTEMS:    CONSTITUTIONAL: No fever,   EYES: no acute visual disturbances  NECK: No pain or stiffness  RESPIRATORY: No cough; No shortness of breath  CARDIOVASCULAR: No chest pain, no palpitations  GASTROINTESTINAL: + pain. No nausea or vomiting; No diarrhea   NEUROLOGICAL: No headache or numbness, no tremors  MUSCULOSKELETAL: No joint pain, no muscle pain  GENITOURINARY: Rt nephrostomy tube, no dysuria, no frequency, no hesitancy  PSYCHIATRY: no depression , no anxiety  ALL OTHER  ROS negative        Vital Signs Last 24 Hrs  T(C): 37.1 (15 Dec 2022 13:17), Max: 37.1 (15 Dec 2022 13:17)  T(F): 98.8 (15 Dec 2022 13:17), Max: 98.8 (15 Dec 2022 13:17)  HR: 89 (15 Dec 2022 13:17) (69 - 89)  BP: 100/60 (15 Dec 2022 13:17) (93/59 - 100/60)  BP(mean): --  RR: 17 (15 Dec 2022 13:17) (17 - 17)  SpO2: 100% (15 Dec 2022 13:17) (97% - 100%)    Parameters below as of 15 Dec 2022 13:17  Patient On (Oxygen Delivery Method): nasal cannula  O2 Flow (L/min): 2      ________________________________________________  PHYSICAL EXAM:  GENERAL: NAD  HEENT: Normocephalic;  conjunctivae and sclerae clear; moist mucous membranes;   NECK : supple  CHEST/LUNG: Clear to auscultation bilaterally with good air entry   HEART: S1 S2  regular; no murmurs, gallops or rubs  ABDOMEN: Soft, tender, +distended; Bowel sounds present  EXTREMITIES: no cyanosis; no edema; no calf tenderness  SKIN: warm and dry; no rash  NERVOUS SYSTEM:  Awake and alert; Oriented  to place, person and time ; no new deficits    _________________________________________________  LABS:                        9.0    13.34 )-----------( 380      ( 15 Dec 2022 09:59 )             30.4               CAPILLARY BLOOD GLUCOSE            RADIOLOGY & ADDITIONAL TESTS:  ACC: [id number] EXAM:  XR FOOT 2 VIEWS LT                          PROCEDURE DATE:  12/12/2022          INTERPRETATION:  Left foot    HISTORY: Pain and swelling     Three views of the left foot show no evidence of fracture nor   destructive change. The joint spaces are maintained.    IMPRESSION: No acute bony pathology.        Thank you for this referral.    --- End of Report ---            RAMOS KINCAID MD; Attending Interventional Radiologist  This document has been electronically signed. Dec 13 2022  4:09PM    ACC: [id number] EXAM:  US DPLX LWR EXT VEINS COMPL BI                          PROCEDURE DATE:  12/04/2022          INTERPRETATION:  CLINICAL INFORMATION: Bilateral leg pain.    COMPARISON: None available.    TECHNIQUE: Duplex sonography of the BILATERAL LOWER extremity veins with   color and spectral Doppler, with and without compression.    FINDINGS:    RIGHT:  Normal compressibility of the RIGHT common femoral, femoral and popliteal   veins.  Doppler examination shows normal spontaneous and phasic flow.  No RIGHT calf vein thrombosis is detected.    LEFT:  Normal compressibility of the LEFT common femoral, femoral and popliteal   veins.  Doppler examination shows normal spontaneous and phasic flow.  No LEFT calf vein thrombosis is detected.    IMPRESSION:  No evidence of deep venous thrombosis in either lower extremity.          --- End of Report ---            LAUREN GUERRERO MD; Attending Radiologist  This document has been electronically signed. Dec  4 2022  1:28PM    ACC: [id number] EXAM:  XR CHEST PORTABLE URGENT 1V                          PROCEDURE DATE:  12/01/2022          INTERPRETATION:  Short of breath.    AP chest. Prior 10/25/2022.    Left ICD. Normal heart mediastinum. Hyperinflated lungs. Multiple   parenchymal nodules of varying sizes reidentified similar to prior   consistent with metastatic disease. No acute infiltrate pleural effusion   or pneumothorax.    IMPRESSION: No acute infiltrate. pulmonary metastatic nodules similar to   prior    --- End of Report ---            GERA PIKE MD; Attending Radiologist  This document has been electronically signed. Dec  2 2022  9:48AM      Imaging  Reviewed:  YES/NO    Consultant(s) Notes Reviewed:   YES/ No      Plan of care was discussed with patient and /or primary care giver; all questions and concerns were addressed

## 2022-12-15 NOTE — PROGRESS NOTE ADULT - SUBJECTIVE AND OBJECTIVE BOX
Patient is seen and examined at the bed side, is afebrile. She is c/o having hematuria, most likely due to  Bladder cancer. The WBC count is trending down.      REVIEW OF SYSTEMS: All other review systems are negative      ALLERGIES: No Known Allergies      Vital Signs Last 24 Hrs  T(C): 37.1 (15 Dec 2022 13:17), Max: 37.1 (15 Dec 2022 13:17)  T(F): 98.8 (15 Dec 2022 13:17), Max: 98.8 (15 Dec 2022 13:17)  HR: 89 (15 Dec 2022 13:17) (69 - 89)  BP: 100/60 (15 Dec 2022 13:17) (93/59 - 100/60)  BP(mean): --  RR: 17 (15 Dec 2022 13:17) (17 - 17)  SpO2: 100% (15 Dec 2022 13:17) (97% - 100%)    Parameters below as of 15 Dec 2022 13:17  Patient On (Oxygen Delivery Method): nasal cannula  O2 Flow (L/min): 2        PHYSICAL EXAM:  GENERAL: Not in acute distress , on oxygen via NC  CHEST/LUNG:  Not using accessory muscles   HEART: s1 and s2 present  ABDOMEN:  Nontender and  Nondistended  EXTREMITIES: No pedal  edema  CNS: Awake and Alert        LABS:                         9.0    13.34 )-----------( 380      ( 15 Dec 2022 09:59 )             30.4                        9.3    17.67 )-----------( 319      ( 09 Dec 2022 05:32 )             31.1             12-13    135  |  99  |  17  ----------------------------<  101<H>  4.1   |  26  |  0.81    Ca    11.0<H>      13 Dec 2022 07:20    TPro  7.1  /  Alb  1.8<L>  /  TBili  0.3  /  DBili  x   /  AST  22  /  ALT  17  /  AlkPhos  230<H>  12-13 12-12    135  |  99  |  16  ----------------------------<  93  3.9   |  26  |  0.74    Ca    10.8<H>      12 Dec 2022 05:35    TPro  6.2  /  Alb  x   /  TBili  x   /  DBili  x   /  AST  x   /  ALT  x   /  AlkPhos  x   12-09    PT/INR - ( 01 Dec 2022 16:25 )   PT: 15.4 sec;   INR: 1.29 ratio      PTT - ( 01 Dec 2022 16:25 )  PTT:24.9 sec        MEDICATIONS  (STANDING):    enoxaparin Injectable 40 milliGRAM(s) SubCutaneous every 24 hours  ferrous    sulfate 325 milliGRAM(s) Oral daily  folic acid 1 milliGRAM(s) Oral daily  lactulose Syrup 15 Gram(s) Oral every other day  lidocaine   4% Patch 1 Patch Transdermal every 24 hours  pantoprazole    Tablet 40 milliGRAM(s) Oral every 12 hours  polyethylene glycol 3350 17 Gram(s) Oral daily  senna 2 Tablet(s) Oral at bedtime        RADIOLOGY & ADDITIONAL TESTS:    12/1/22 : Xray Chest 1 View- PORTABLE-Urgent (Xray Chest 1 View- PORTABLE-Urgent .) (12.01.22 @ 18:12) >    Left ICD. Normal heart mediastinum. Hyperinflated lungs. Multiple parenchymal nodules of varying sizes reidentified similar to prior   consistent with metastatic disease. No acute infiltrate pleural effusion  or pneumothorax.    IMPRESSION: No acute infiltrate. pulmonary metastatic nodules similar to  prior        MICROBIOLOGY DATA:    Culture - Blood (12.04.22 @ 12:36)   Specimen Source: .Blood Blood   Culture Results: No growth to date.     Culture - Urine (12.04.22 @ 04:43)   Specimen Source: Clean Catch Clean Catch (Midstream)   Culture Results: <10,000 CFU/mL Normal Urogenital Vandana     Urine Microscopic-Add On (NC) (12.04.22 @ 04:43)   Red Blood Cell - Urine: >50 /HPF   White Blood Cell - Urine: >50 /HPF   Bacteria: Many /HPF   Epithelial Cells: Few /HPF       COVID-19 PCR (12.01.22 @ 16:25)   COVID-19 PCR: NotDetec:

## 2022-12-15 NOTE — PROGRESS NOTE ADULT - PROBLEM SELECTOR PLAN 6
hx of vaginal bleeding, declined obgyn assessment at [facility]  Hx of bladder cancer likely causing bleeding   pt seen by GYN at [facility]: no intervention possible at this time..

## 2022-12-15 NOTE — PROGRESS NOTE ADULT - SUBJECTIVE AND OBJECTIVE BOX
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 Patient states that she developed sudden onset dyspnea 2 days ago at rest. The dyspnoea is not alleviated or worsened by any factors. Pt also endorses vaginal bleeding for which she was evaluated by OBGYN at [facility] and was recommended further work up including TVUS but patient refused. Patient denies any chest pain. She was recently admitted at [facility] in October 2022 for hemoptysis and CT chest at that time showed Numerous bilateral pulmonary metastases; mild interval increase in size of a few of the largest pulmonary metastases compared to 9/20/202. Hospital course at that time was complicated by a drop in Hb for which she received blood transfusion. She also tested positive for COVID 19 but was asymptomatic. Pt has a chronic nephrostomy tube that was placed at [facility] for right hydronephrosis. She follows with Dr Kan at [facility]. Pt last recieved chemotherapy in may of 2022. She denies any fevers, chest pain, abd pain.    In ED VS: T 98, Hb 7.8, WBC 15, Na 132   (01 Dec 2022 19:07)    Dopplers- No evidence of deep venous thrombosis in either lower extremity.    Pt is admitted for SOB, anemia. Received 2 units PRBC on 12/1 and 12/2. Pt with metastatic bladder CA to lungs, on chronic opioids. + right nephrostomy (nephrostomy tube changed on 12/7). On morphine 5mg PO solution at home q4h PRN per istop. Pt seen and examined at bedside this afternoon. Sitting in bed, reports she feels like her providers are "giving up on her". Emotional support provided. Pt reports lumbar back and b/l flank pain score 6/10 and tolerable with current pain regimen. SCALE USED: (1-10 VNRS). Pt describes pain as constant, throbbing, radiating throughout lumbar back, alleviated by pain medication, exacerbated by movement and palpation. Pt states she occasionally take morphine 6mg PO PRN if needed for pain. Pt also reports abdominal pain 6/10, generalized, tightness, fullness a/w constipation. Reports last BM 1 week ago. Pt on bowel regimen. Requesting fruit cup, informed kitchen. Pt tolerating PO diet, however has poor appetite and weight loss. Reports SOB on exertion, and requesting to be discharged on O2 at home however her O2 sat on room air does not qualify her to receive O2 at home. Reports lethargy, constipation and vaginal bleeding. Denies chest pain, nausea, vomiting. Patient stated goal for pain control: to be able to take deep breaths, get out of bed to chair and ambulate with tolerable pain control. PT ambulating to the bathroom with tolerable pain. Per pt, plan for oncology followup outpatient, has an appointment on 12/23.     PAST MEDICAL & SURGICAL HISTORY:  Anemia      Asthma      HLD (hyperlipidemia)      Pacemaker  [employer]      Bladder cancer      HTN (hypertension)      Parathyroid tumor      Liver cyst      Hydronephrosis  has right Nephrostomy tube - dressing intact      Bradycardia      History of renal calculi      Birthmark      H/O myomectomy      Presence of cardiac pacemaker      H/O hydronephrosis  s/p Right Perc nephrostomy tube placement 02/2021, exchange 06/2021          FAMILY HISTORY:  Family history of prostate cancer (Father)    Family history of CHF (congestive heart failure) (Father)    Family history of atrial fibrillation (Father)        Social History:  Denies any smoking, etoh , or drug use.o (01 Dec 2022 19:07)    Allergies    No Known Allergies    MEDICATIONS  (STANDING):  enoxaparin Injectable 40 milliGRAM(s) SubCutaneous every 24 hours  ferrous    sulfate 325 milliGRAM(s) Oral daily  folic acid 1 milliGRAM(s) Oral daily  lactulose Syrup 15 Gram(s) Oral every other day  lidocaine   4% Patch 1 Patch Transdermal every 24 hours  pantoprazole    Tablet 40 milliGRAM(s) Oral every 12 hours  polyethylene glycol 3350 17 Gram(s) Oral daily  senna 2 Tablet(s) Oral at bedtime    MEDICATIONS  (PRN):  acetaminophen     Tablet .. 325 milliGRAM(s) Oral every 4 hours PRN Moderate Pain (4 - 6)  aluminum hydroxide/magnesium hydroxide/simethicone Suspension 30 milliLiter(s) Oral every 4 hours PRN Dyspepsia  bisacodyl Suppository 10 milliGRAM(s) Rectal daily PRN Constipation  diphenhydrAMINE 25 milliGRAM(s) Oral every 4 hours PRN Itching  morphine   Solution 5 milliGRAM(s) Oral every 3 hours PRN Severe Pain (7 - 10)  simethicone 80 milliGRAM(s) Chew every 6 hours PRN Gas      Vital Signs Last 24 Hrs  T(C): 37.1 (15 Dec 2022 13:17), Max: 37.1 (15 Dec 2022 13:17)  T(F): 98.8 (15 Dec 2022 13:17), Max: 98.8 (15 Dec 2022 13:17)  HR: 89 (15 Dec 2022 13:17) (69 - 89)  BP: 100/60 (15 Dec 2022 13:17) (93/59 - 100/60)  BP(mean): --  RR: 17 (15 Dec 2022 13:17) (17 - 17)  SpO2: 100% (15 Dec 2022 13:17) (97% - 100%)    Parameters below as of 15 Dec 2022 13:17  Patient On (Oxygen Delivery Method): nasal cannula  O2 Flow (L/min): 2    LABS: Reviewed                          9.0    13.34 )-----------( 380      ( 15 Dec 2022 09:59 )             30.4     COVID-19 PCR: NotDetec (14 Dec 2022 15:00)  COVID-19 PCR: NotDetec (07 Dec 2022 05:43)      Radiology: Reviewed.   < from: US Duplex Venous Lower Ext Complete, Bilateral (12.04.22 @ 13:23) >    ACC: [id number] EXAM:  US DPLX LWR EXT VEINS COMPL BI                          PROCEDURE DATE:  12/04/2022          INTERPRETATION:  CLINICAL INFORMATION: Bilateral leg pain.    COMPARISON: None available.    TECHNIQUE: Duplex sonography of the BILATERAL LOWER extremity veins with   color and spectral Doppler, with and without compression.    FINDINGS:    RIGHT:  Normal compressibility of the RIGHT common femoral, femoral and popliteal   veins.  Doppler examination shows normal spontaneous and phasic flow.  No RIGHT calf vein thrombosis is detected.    LEFT:  Normal compressibility of the LEFT common femoral, femoral and popliteal   veins.  Doppler examination shows normal spontaneous and phasic flow.  No LEFT calf vein thrombosis is detected.    IMPRESSION:  No evidence of deep venous thrombosis in either lower extremity.          --- End of Report ---            LAUREN GUERRERO MD; Attending Radiologist  This document has been electronically signed. Dec  4 2022  1:28PM    < end of copied text >      ORT Score -   Family Hx of substance abuse	Female	      Male  Alcohol 	                                           1                     3  Illegal drugs	                                   2                     3  Rx drugs                                           4 	                  4  Personal Hx of substance abuse		  Alcohol 	                                          3	                  3  Illegal drugs                                     4	                  4  Rx drugs                                            5 	                  5  Age between 16- 45 years	           1                     1  hx preadolescent sexual abuse	   3 	                  0  Psychological disease		  ADD, OCD, bipolar, schizophrenia   2	          2  Depression                                           1 	          1  Total: 0    a score of 3 or lower indicates low risk for opioid abuse		  a score of 4-7 indicates moderate risk for opioid abuse		  a score of 8 or higher indicates high risk for opioid abuse    REVIEW OF SYSTEMS:  CONSTITUTIONAL: No fever + fatigue  HEENT:  No difficulty hearing, no change in vision  NECK: No pain or stiffness  RESPIRATORY: No cough, wheezing, chills or hemoptysis; + occasional shortness of breath on exertion   CARDIOVASCULAR: No chest pain, palpitations, dizziness, or leg swelling + left chest wall pacemaker  GASTROINTESTINAL: + loss of appetite, decreased PO intake. + weight loss; + occasional abdominal pain. + bloating; + tightness, fullness. No nausea, vomiting; No diarrhea + constipation.   GENITOURINARY: No dysuria, frequency, hematuria, retention or incontinence + solitary right kidney, with nephrostomy   GYN: + vaginal bleeding  MUSCULOSKELETAL: + chronic low back and flank pain; No joint swelling; + generalized upper and lower motor strength weakness, no saddle anesthesia, bowel/bladder incontinence, no falls   NEURO: No headaches, No numbness/tingling b/l LE, No weakness    PHYSICAL EXAM:  GENERAL:  + fatigued & Oriented X4, cooperative, NAD, Good concentration. Speech is clear. + cachectic   RESPIRATORY: Respirations even and unlabored. Clear to auscultation bilaterally; No rales, rhonchi, wheezing, or rubs + O2 2L NC   CARDIOVASCULAR: Normal S1/S2, regular rate and rhythm; No murmurs, rubs, or gallops. No JVD. + left chest wall pacemaker   GASTROINTESTINAL:  + distended, + generalized tenderness; Bowel sounds present  GENITOURINARY: right flank nephrostomy tube draining clear yellow urine, dressing c/d/i   PERIPHERAL VASCULAR:  Extremities warm without edema. 2+ Peripheral Pulses, No cyanosis, No calf tenderness  MUSCULOSKELETAL: Motor Strength 4/5 B/L upper and lower extremities; moves all extremities equally against gravity; ROM intact; + lumbar back and flank tenderness on palpation. No paraspinal tenderness.   SKIN: Warm, dry, intact. No rashes, lesions, scars or wounds.     Risk factors associated with adverse outcomes related to opioid treatment  [ ]  Concurrent benzodiazepine use  [ ]  History/ Active substance use or alcohol use disorder  [ ] Psychiatric co-morbidity  [ ] Sleep apnea  [ ] COPD  [ ] BMI> 35  [ ] Liver dysfunction  [X ] Renal dysfunction  [ ] CHF  [ ] Smoker  [ ]  Age > 60 years    [X ]  [place]  Reviewed and Copied to Chart. See below.    Plan of care and goal oriented pain management treatment options were discussed with patient and /or primary care giver; all questions and concerns were addressed and care was aligned with patient's wishes.    Educated patient on goal oriented pain management treatment options     12-15-22 @ 15:00

## 2022-12-15 NOTE — PROGRESS NOTE ADULT - SUBJECTIVE AND OBJECTIVE BOX
Date of Service  : 12-15-22     INTERVAL HPI/OVERNIGHT EVENTS: Having hematuria.   Vital Signs Last 24 Hrs  T(C): 36.9 (15 Dec 2022 06:09), Max: 36.9 (14 Dec 2022 14:51)  T(F): 98.5 (15 Dec 2022 06:09), Max: 98.5 (15 Dec 2022 06:09)  HR: 69 (15 Dec 2022 06:09) (69 - 90)  BP: 93/59 (15 Dec 2022 06:09) (93/59 - 97/51)  BP(mean): --  RR: 17 (15 Dec 2022 06:09) (17 - 17)  SpO2: 99% (15 Dec 2022 06:09) (97% - 99%)    Parameters below as of 15 Dec 2022 06:09  Patient On (Oxygen Delivery Method): room air      I&O's Summary    14 Dec 2022 07:01  -  15 Dec 2022 07:00  --------------------------------------------------------  IN: 0 mL / OUT: 300 mL / NET: -300 mL      MEDICATIONS  (STANDING):  enoxaparin Injectable 40 milliGRAM(s) SubCutaneous every 24 hours  ferrous    sulfate 325 milliGRAM(s) Oral daily  folic acid 1 milliGRAM(s) Oral daily  lactulose Syrup 15 Gram(s) Oral every other day  lidocaine   4% Patch 1 Patch Transdermal every 24 hours  pantoprazole    Tablet 40 milliGRAM(s) Oral every 12 hours  polyethylene glycol 3350 17 Gram(s) Oral daily  senna 2 Tablet(s) Oral at bedtime    MEDICATIONS  (PRN):  acetaminophen     Tablet .. 325 milliGRAM(s) Oral every 4 hours PRN Moderate Pain (4 - 6)  aluminum hydroxide/magnesium hydroxide/simethicone Suspension 30 milliLiter(s) Oral every 4 hours PRN Dyspepsia  bisacodyl Suppository 10 milliGRAM(s) Rectal daily PRN Constipation  diphenhydrAMINE 25 milliGRAM(s) Oral every 4 hours PRN Itching  morphine   Solution 5 milliGRAM(s) Oral every 3 hours PRN Severe Pain (7 - 10)  simethicone 80 milliGRAM(s) Chew every 6 hours PRN Gas    LABS:                        9.0    13.34 )-----------( 380      ( 15 Dec 2022 09:59 )             30.4               CAPILLARY BLOOD GLUCOSE              REVIEW OF SYSTEMS:  CONSTITUTIONAL: No fever, weight loss, or fatigue  EYES: No eye pain, visual disturbances, or discharge  ENMT:  No difficulty hearing, tinnitus, vertigo; No sinus or throat pain  NECK: No pain or stiffness  RESPIRATORY: No cough, wheezing, chills or hemoptysis; No shortness of breath  CARDIOVASCULAR: No chest pain, palpitations, dizziness, or leg swelling  GASTROINTESTINAL: No abdominal or epigastric pain. No nausea, vomiting, or hematemesis; No diarrhea or constipation. No melena or hematochezia.  GENITOURINARY: No dysuria, frequency, , or incontinence  NEUROLOGICAL: No headaches, memory loss, loss of strength, numbness, or tremors      RADIOLOGY & ADDITIONAL TESTS:    Consultant(s) Notes Reviewed:  [x ] YES  [ ] NO    PHYSICAL EXAM:  GENERAL: NAD, cachetic   HEAD:  Atraumatic, Normocephalic  NECK: Supple, No JVD, Normal thyroid  NERVOUS SYSTEM:  Alert & Oriented X3, No focal deficit   CHEST/LUNG: Good air entry bilateral with no  rales, rhonchi, wheezing, or rubs  HEART: Regular rate and rhythm; No murmurs, rubs, or gallops  ABDOMEN: Soft, Nontender, Nondistended; Bowel sounds present  EXTREMITIES:  2+ Peripheral Pulses, No clubbing, cyanosis, or edema  Care Discussed with Consultants/Other Providers [ x] YES  [ ] NO

## 2022-12-15 NOTE — PROGRESS NOTE ADULT - ASSESSMENT
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      Problem/Plan - 1:  ·  Problem: dyspnea.   ·  Plan: Chest xray showing metastatic disease unchanged from prior : most likely cause of dyspnea ( although anemia contributing)   high risk for DVT/PE but with contraindication for AC due to active chronic vaginal bleeding due to cancer and patient has been saturating well and not tachycardic   home medication morphine for dyspnea and pain.  as above asking for home O2 and trying to set up on her own , stating she was approved for it before !!      Problem/Plan - 2:  ·  Problem: Anemia.   post 2 Units PRBC with good results   f/u CBC as may need PRBC.   hem following       12-02-22 @ 05:45  Iron:     16 ug/dL  Iron %:   9 %  TIBC:     180 ug/dL  iron supplement   ( could not get IV iron as does not have IV access)     *** unclear cause of leukocytosis ... ? reactive    monitor for now     improved      Problem/Plan - 3:  ·  Problem: Vaginal bleeding.   ·  Plan: hx of vaginal bleeding, declined obgyn assessment at [facility]  Hx of bladder cancer likely causing bleeding   pt seen by GYN at [facility]: no intervention possible at this time..   pt will need treatment for cancer      Problem/Plan - 4:  ·  Problem: Hyponatremia. improved  encourage PO intake      Problem/Plan - 5:  ·  Problem: Bladder cancer.   ·  Plan: Hx of Bladder Ca with mets  last chemotherapy in may of 2022.  hem onc following  palliative appreciated   continue current pain mgmt  patient very resistant in trying new medications    **nephrostomy tube overdue for exchange>>> changed by IR     worsened leukocytosis post pricedure / likely reactive ?     ** pt being treated by ID for recurrent UTIS !      monitor WBC    ** hypercalcemia     appreciated Hem onc eval    pt declined treatment but ended up taking the bisphosphonates      monitor     Problem/Plan - 6:  ·  Problem: Prophylactic measure.   ·  Plan: SCD  PPI.  S/S eval appreciated  patient declined taking antiacid meds / famotidine / PPI    Dispo planing home  patient not interested in hospice   supportive care   OP Onc follow up   patient medically stable for DC

## 2022-12-16 NOTE — PROGRESS NOTE ADULT - ASSESSMENT
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 Patient also endorses vaginal bleeding for which she was evaluated by OBGYN at [facility] and was recommended further work up including TVUS but patient refused. Pt has a chronic nephrostomy tube that was placed at [facility] for right hydronephrosis. She follows with Dr Kan at [facility]. Pt last received chemotherapy in may of 2022. Patient admitted to medicine for dyspnea, Chest x-ray showing metastatic disease unchanged from prior. Patient at high risk for DVT/PE but with contraindication for AC due to active chronic vaginal bleeding due to cancer and patient has been saturating well and not tachycardic, home medication morphine for dyspnea and pain.   ID, Hem/onc and pain consulted. Patient admission complicated by acute anemia. Pt transfused 2U PRBC with a good response. Palliative consulted for conversation about hospice due to advanced metastatic cancer, patient refused services at this time and wants to f/o with her new oncologist after discharge. Patient c/o left foot swelling and pain, unsure if something dropped on her foot?  X-ray of left foot performed pending official reading. CM onboard for discharge planning. Patient medically cleared for discharge.    12/15- patient seen and examined at bedside, c/o chronic pain, pain management is following, Patient refusing to leave, discharge notice CM and SW is following.  Patient medically cleared for discharge.    12/16-  patient seen and examined at bedside, c/o chronic pain, pain management is following, Patient refusing to leave, discharge notice CM and SW is following.  Patient medically cleared for discharge.  Patient lost appeal and refusing to be discharge, CM onboard for 2nd appeal.

## 2022-12-16 NOTE — PROGRESS NOTE ADULT - SUBJECTIVE AND OBJECTIVE BOX
Date of Service  : 12-16-22    INTERVAL HPI/OVERNIGHT EVENTS: No new concerns.   Vital Signs Last 24 Hrs  T(C): 36.6 (16 Dec 2022 04:47), Max: 36.7 (16 Dec 2022 00:28)  T(F): 97.9 (16 Dec 2022 04:47), Max: 98.1 (16 Dec 2022 00:28)  HR: 74 (16 Dec 2022 04:47) (72 - 76)  BP: 98/62 (16 Dec 2022 04:47) (93/59 - 100/63)  BP(mean): --  RR: 18 (16 Dec 2022 04:47) (17 - 18)  SpO2: 97% (16 Dec 2022 04:47) (96% - 98%)    Parameters below as of 16 Dec 2022 04:47  Patient On (Oxygen Delivery Method): nasal cannula  O2 Flow (L/min): 2    I&O's Summary    15 Dec 2022 07:01  -  16 Dec 2022 07:00  --------------------------------------------------------  IN: 0 mL / OUT: 600 mL / NET: -600 mL      MEDICATIONS  (STANDING):  enoxaparin Injectable 40 milliGRAM(s) SubCutaneous every 24 hours  ferrous    sulfate 325 milliGRAM(s) Oral daily  folic acid 1 milliGRAM(s) Oral daily  lactulose Syrup 15 Gram(s) Oral every other day  lidocaine   4% Patch 1 Patch Transdermal every 24 hours  pantoprazole    Tablet 40 milliGRAM(s) Oral every 12 hours  polyethylene glycol 3350 17 Gram(s) Oral daily  senna 2 Tablet(s) Oral at bedtime    MEDICATIONS  (PRN):  acetaminophen     Tablet .. 325 milliGRAM(s) Oral every 4 hours PRN Moderate Pain (4 - 6)  aluminum hydroxide/magnesium hydroxide/simethicone Suspension 30 milliLiter(s) Oral every 4 hours PRN Dyspepsia  bisacodyl Suppository 10 milliGRAM(s) Rectal daily PRN Constipation  diphenhydrAMINE 25 milliGRAM(s) Oral every 4 hours PRN Itching  morphine   Solution 5 milliGRAM(s) Oral every 3 hours PRN Severe Pain (7 - 10)  simethicone 80 milliGRAM(s) Chew every 6 hours PRN Gas    LABS:                        9.0    13.34 )-----------( 380      ( 15 Dec 2022 09:59 )             30.4               CAPILLARY BLOOD GLUCOSE                RADIOLOGY & ADDITIONAL TESTS:    Consultant(s) Notes Reviewed:  [x ] YES  [ ] NO    PHYSICAL EXAM:  GENERAL: NAD, cachetic   HEAD:  Atraumatic, Normocephalic  NECK: Supple, No JVD, Normal thyroid  NERVOUS SYSTEM:  Alert & Oriented X3, No focal deficit   CHEST/LUNG: Good air entry bilateral with no  rales, rhonchi, wheezing, or rubs  HEART: Regular rate and rhythm; No murmurs, rubs, or gallops  ABDOMEN: Soft, Nontender, Nondistended; Bowel sounds present  EXTREMITIES:  2+ Peripheral Pulses, No clubbing, cyanosis, or edema    Care Discussed with Consultants/Other Providers [ x] YES  [ ] NO

## 2022-12-16 NOTE — PROGRESS NOTE ADULT - SUBJECTIVE AND OBJECTIVE BOX
NP Note discussed with Primary Attending.    Patient is a 56y old  Female who presents with a chief complaint of dyspnea (15 Dec 2022 17:30)      INTERVAL HPI/OVERNIGHT EVENTS: no new complaints    MEDICATIONS  (STANDING):  enoxaparin Injectable 40 milliGRAM(s) SubCutaneous every 24 hours  ferrous    sulfate 325 milliGRAM(s) Oral daily  folic acid 1 milliGRAM(s) Oral daily  lactulose Syrup 15 Gram(s) Oral every other day  lidocaine   4% Patch 1 Patch Transdermal every 24 hours  pantoprazole    Tablet 40 milliGRAM(s) Oral every 12 hours  polyethylene glycol 3350 17 Gram(s) Oral daily  senna 2 Tablet(s) Oral at bedtime    MEDICATIONS  (PRN):  acetaminophen     Tablet .. 325 milliGRAM(s) Oral every 4 hours PRN Moderate Pain (4 - 6)  aluminum hydroxide/magnesium hydroxide/simethicone Suspension 30 milliLiter(s) Oral every 4 hours PRN Dyspepsia  bisacodyl Suppository 10 milliGRAM(s) Rectal daily PRN Constipation  diphenhydrAMINE 25 milliGRAM(s) Oral every 4 hours PRN Itching  morphine   Solution 5 milliGRAM(s) Oral every 3 hours PRN Severe Pain (7 - 10)  simethicone 80 milliGRAM(s) Chew every 6 hours PRN Gas      __________________________________________________  REVIEW OF SYSTEMS:    CONSTITUTIONAL: No fever,   EYES: no acute visual disturbances  NECK: No pain or stiffness  RESPIRATORY: No cough; No shortness of breath  CARDIOVASCULAR: No chest pain, no palpitations  GASTROINTESTINAL: + pain. No nausea or vomiting; No diarrhea   NEUROLOGICAL: No headache or numbness, no tremors  MUSCULOSKELETAL: No joint pain, no muscle pain  GENITOURINARY: no dysuria, no frequency, no hesitancy  PSYCHIATRY: no depression , no anxiety  ALL OTHER  ROS negative        Vital Signs Last 24 Hrs  T(C): 36.6 (16 Dec 2022 04:47), Max: 36.7 (16 Dec 2022 00:28)  T(F): 97.9 (16 Dec 2022 04:47), Max: 98.1 (16 Dec 2022 00:28)  HR: 74 (16 Dec 2022 04:47) (72 - 76)  BP: 98/62 (16 Dec 2022 04:47) (93/59 - 100/63)  BP(mean): --  RR: 18 (16 Dec 2022 04:47) (17 - 18)  SpO2: 97% (16 Dec 2022 04:47) (96% - 98%)    Parameters below as of 16 Dec 2022 04:47  Patient On (Oxygen Delivery Method): nasal cannula  O2 Flow (L/min): 2      ________________________________________________  PHYSICAL EXAM:  GENERAL: NAD  HEENT: Normocephalic;  conjunctivae and sclerae clear; moist mucous membranes;   NECK : supple  CHEST/LUNG: Clear to auscultation bilaterally with good air entry   HEART: S1 S2  regular; no murmurs, gallops or rubs  ABDOMEN: Soft, Nontender, +distended; Bowel sounds present  EXTREMITIES: no cyanosis; no edema; no calf tenderness  SKIN: warm and dry; no rash  NERVOUS SYSTEM:  Awake and alert; Oriented  to place, person and time ; no new deficits    _________________________________________________  LABS:                        9.0    13.34 )-----------( 380      ( 15 Dec 2022 09:59 )             30.4               CAPILLARY BLOOD GLUCOSE            RADIOLOGY & ADDITIONAL TESTS:  ACC: [id number] EXAM:  XR FOOT 2 VIEWS LT                          PROCEDURE DATE:  12/12/2022          INTERPRETATION:  Left foot    HISTORY: Pain and swelling     Three views of the left foot show no evidence of fracture nor   destructive change. The joint spaces are maintained.    IMPRESSION: No acute bony pathology.        Thank you for this referral.    --- End of Report ---            RAMOS KINCAID MD; Attending Interventional Radiologist  This document has been electronically signed. Dec 13 2022  4:09PM    ACC: [id number] EXAM:  US DPLX LWR EXT VEINS COMPL BI                          PROCEDURE DATE:  12/04/2022          INTERPRETATION:  CLINICAL INFORMATION: Bilateral leg pain.    COMPARISON: None available.    TECHNIQUE: Duplex sonography of the BILATERAL LOWER extremity veins with   color and spectral Doppler, with and without compression.    FINDINGS:    RIGHT:  Normal compressibility of the RIGHT common femoral, femoral and popliteal   veins.  Doppler examination shows normal spontaneous and phasic flow.  No RIGHT calf vein thrombosis is detected.    LEFT:  Normal compressibility of the LEFT common femoral, femoral and popliteal   veins.  Doppler examination shows normal spontaneous and phasic flow.  No LEFT calf vein thrombosis is detected.    IMPRESSION:  No evidence of deep venous thrombosis in either lower extremity.          --- End of Report ---            LAUREN GUERRERO MD; Attending Radiologist  This document has been electronically signed. Dec  4 2022  1:28PM  ACC: [id number] EXAM:  XR CHEST PORTABLE URGENT 1V                          PROCEDURE DATE:  12/01/2022          INTERPRETATION:  Short of breath.    AP chest. Prior 10/25/2022.    Left ICD. Normal heart mediastinum. Hyperinflated lungs. Multiple   parenchymal nodules of varying sizes reidentified similar to prior   consistent with metastatic disease. No acute infiltrate pleural effusion   or pneumothorax.    IMPRESSION: No acute infiltrate. pulmonary metastatic nodules similar to   prior    --- End of Report ---            GERA PIKE MD; Attending Radiologist  This document has been electronically signed. Dec  2 2022  9:48AM    Imaging  Reviewed:  YES/NO    Consultant(s) Notes Reviewed:   YES/ No      Plan of care was discussed with patient and /or primary care giver; all questions and concerns were addressed

## 2022-12-16 NOTE — PROGRESS NOTE ADULT - PROBLEM SELECTOR PLAN 6
hx of vaginal bleeding, declined obgyn assessment at [facility]  Hx of bladder cancer likely causing bleeding   pt seen by GYN at [facility]: no intervention possible at this time..

## 2022-12-17 NOTE — PROGRESS NOTE ADULT - SUBJECTIVE AND OBJECTIVE BOX
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 Patient states that she developed sudden onset dyspnea 2 days ago at rest. The dyspnoea is not alleviated or worsened by any factors. Pt also endorses vaginal bleeding for which she was evaluated by OBGYN at [facility] and was recommended further work up including TVUS but patient refused. Patient denies any chest pain. She was recently admitted at [facility] in October 2022 for hemoptysis and CT chest at that time showed Numerous bilateral pulmonary metastases; mild interval increase in size of a few of the largest pulmonary metastases compared to 9/20/202. Hospital course at that time was complicated by a drop in Hb for which she received blood transfusion. She also tested positive for COVID 19 but was asymptomatic. Pt has a chronic nephrostomy tube that was placed at [facility] for right hydronephrosis. She follows with Dr Kan at [facility]. Pt last recieved chemotherapy in may of 2022. She denies any fevers, chest pain, abd pain.    In ED VS: T 98, Hb 7.8, WBC 15, Na 132   (01 Dec 2022 19:07)    Dopplers- No evidence of deep venous thrombosis in either lower extremity.    Pt is admitted for SOB, anemia. Received 2 units PRBC on 12/1 and 12/2. Pt with metastatic bladder CA to lungs, on chronic opioids. + right nephrostomy (nephrostomy tube changed on 12/7). On morphine 5mg PO solution at home q4h PRN per Istop. Pt seen and examined at bedside this morning. Sitting in bed, moaning softly, reports lumbar back and b/l flank pain score 8/10 and not tolerable. SCALE USED: (1-10 VNRS). Pt recently received PRN pain medications, which helps her pain. Pt describes pain as constant, throbbing, radiating throughout lumbar back, alleviated by pain medication, exacerbated by movement and palpation. Pt c/o abdominal discomfort and that her abdomen feels "tight" as she did not have a BM in the last 3 days. Reports occasional gas pain for which she takes Gas-X at home. Pt tolerating PO diet, however she has poor appetite and weight loss.  Reports lethargy, constipation and vaginal bleeding. Last BM 12/14.  States that constipation is usually relieved with prunes, prune juice and suppository. Discussed with RN to give prunes/prune juice and that patient is willing to have Dulcolax suppository for constipation.  Denies chest pain, nausea, vomiting. Patient stated goal for pain control: to be able to take deep breaths, get out of bed to chair and ambulate with tolerable pain control. PT ambulating to the bathroom with tolerable pain. Per pt, plan for oncology followup outpatient, has an appointment on 12/23.     PAST MEDICAL & SURGICAL HISTORY:  Anemia  Asthma    HLD (hyperlipidemia)    Pacemaker  [facility] [facility]    Bladder cancer    HTN (hypertension)    Parathyroid tumor    Liver cyst    Hydronephrosis  Has right Nephrostomy tube - dressing intact    Bradycardia    History of renal calculi    Birthmark    H/O myomectomy    Presence of cardiac pacemaker    H/O hydronephrosis  s/p Right Perc nephrostomy tube placement 02/2021, exchange 06/2021      FAMILY HISTORY:  Family history of prostate cancer (Father)    Family history of CHF (congestive heart failure) (Father)    Family history of atrial fibrillation (Father)      Social History:  Denies any smoking, Etoh , or drug use.o (01 Dec 2022 19:07)    Allergies    No Known Allergies    MEDICATIONS  (STANDING):  enoxaparin Injectable 40 milliGRAM(s) SubCutaneous every 24 hours  ferrous    sulfate 325 milliGRAM(s) Oral daily  folic acid 1 milliGRAM(s) Oral daily  lactulose Syrup 15 Gram(s) Oral every other day  lidocaine   4% Patch 1 Patch Transdermal every 24 hours  pantoprazole    Tablet 40 milliGRAM(s) Oral every 12 hours  polyethylene glycol 3350 17 Gram(s) Oral daily  senna 2 Tablet(s) Oral at bedtime    MEDICATIONS  (PRN):  acetaminophen Tablet .. 325 milliGRAM(s) Oral every 4 hours PRN Moderate Pain (4 - 6)  aluminum hydroxide/magnesium hydroxide/simethicone Suspension 30 milliLiter(s) Oral every 4 hours PRN Dyspepsia  bisacodyl Suppository 10 milliGRAM(s) Rectal daily PRN Constipation  DiphenhydrAMINE 25 milliGRAM(s) Oral every 4 hours PRN Itching  morphine   Solution 5 milliGRAM(s) Oral every 3 hours PRN Severe Pain (7 - 10)  simethicone 80 milliGRAM(s) Chew every 6 hours PRN Gas    Vital Signs Last 24 Hrs  T(C): 36.6 (17 Dec 2022 05:23), Max: 36.6 (17 Dec 2022 05:23)  T(F): 97.8 (17 Dec 2022 05:23), Max: 97.8 (17 Dec 2022 05:23)  HR: 65 (17 Dec 2022 05:23) (65 - 72)  BP: 100/63 (17 Dec 2022 05:23) (95/50 - 105/63)  BP(mean): --  RR: 18 (17 Dec 2022 05:23) (18 - 18)  SpO2: 100% (17 Dec 2022 05:23) (100% - 100%)    Parameters below as of 17 Dec 2022 05:23  Patient On (Oxygen Delivery Method): nasal cannula  O2 Flow (L/min): 2    LABS: Reviewed                        8.4    11.84 )-----------( 347      ( 17 Dec 2022 05:40 )             28.4     12-17    134<L>  |  101  |  17  ----------------------------<  101<H>  4.1   |  26  |  0.69    Ca    10.6<H>      17 Dec 2022 05:40    TPro  6.9  /  Alb  2.1<L>  /  TBili  0.3  /  DBili  x   /  AST  24  /  ALT  21  /  AlkPhos  263<H>  12-17    LIVER FUNCTIONS - ( 17 Dec 2022 05:40 )  Alb: 2.1 g/dL / Pro: 6.9 g/dL / ALK PHOS: 263 U/L / ALT: 21 U/L DA / AST: 24 U/L / GGT: x           CAPILLARY BLOOD GLUCOSE    < from: IR Change Nephrostomy Catheter, Right (12.07.22 @ 12:14) >    ACC: [id number] EXAM:  SP FLUOROSCOPY TO 1 HOUR                        ACC: [id number] EXAM:  SP NEPHROSTOGRM NEW AC RT SISC                        ACC: [id number] EXAM:  IR PROCEDURE                        ACC: [id number] EXAM:  SP CHNG NEPHRO CATH RT [facility]                          PROCEDURE DATE:  12/07/2022        INTERPRETATION:  PROCEDURE: Right nephrostomy catheter evaluation and   exchange.    : ALEJANDRINA Araya A Palvanov, MD    ANESTHESIA: Intravenous sedation was administered by the attending   anesthesiologist. A total of 1 mL of 1% lidocaine was used for local   anesthesia.    CLINICAL HISTORY: 57 yo female with a history of bladder cancer and   chronic right nephrostomy catheter, last exchanged on 08/22/2022. Patient   presents for an exchange of right nephrostomy tube.    TECHNIQUE: After discussing the risks, benefits and alternatives to the   procedure, informed consent was obtained. A timeout was performed.    Patient was placed prone on the fluoroscopy examination tableand   connected to physiologic monitoring. Examination of the patient's   nephrostomy drain site demonstrated a well-positioned catheter. The right   flank and nephrostomy catheter was prepped and draped in a sterile   fashion. Preliminary fluoroscopy demonstrated a pigtail catheter   overlying the right hemiabdomen with no evidence of kink or fracture. A   nephrostogram was performed, which demonstrated a minimally dilated   collecting system, with a slightly retracted catheter within the lower   pole. The catheter hub was cut and multiple attempts were made to advance   various wires via the catheter into the collecting system, however a wire   was not able to be passed due to encrustation of the catheter. With the   aid of 0.035 Glidewire,the catheter was gently removed under   fluoroscopic observation. A 4 [place]an Berenstein catheter and Glidewire   were used to re-catheterize the right renal pelvis, which was confirmed   with injection of contrast. Subsequently, a stiff 0.035 wire was advanced   into the collecting system and the Berenstein catheter was exchanged over   the wire for a new 10.2 [place]an locking loop pigtail catheter, which was   formed within the right renal pelvis under fluoroscopic observation. A   nephrostogram was performed, confirming catheter positioning. The   catheter was flushed with normal saline, secured to patient's skin with   2-0 nylon suture, and connected to gravity drainage. A dry sterile   dressing was applied.    The patient tolerated the procedure well and was transferred from the   department in stable condition. There were no immediate complications.    IMPRESSION: Successful fluoroscopy-guided exchange of right nephrostomy   catheter, as described above.    The patient was instructed to make a follow-up appointment for routine   exchange in 1-2 months, or sooner if she encounters problems with the   catheter.    --- End of Report ---      PEE TINOCO ,Interventional Radiology  This document has been electronically signed.  URBANO PRABHAKAR MD; Attending Interventional Radiologist  This document has been electronically signed. Dec  9 2022 12:10PM    < end of copied text >    Radiology: Reviewed.   < from: US Duplex Venous Lower Ext Complete, Bilateral (12.04.22 @ 13:23) >    ACC: [id number] EXAM:  US DPLX LWR EXT VEINS COMPL BI                          PROCEDURE DATE:  12/04/2022        INTERPRETATION:  CLINICAL INFORMATION: Bilateral leg pain.    COMPARISON: None available.    TECHNIQUE: Duplex sonography of the BILATERAL LOWER extremity veins with   color and spectral Doppler, with and without compression.    FINDINGS:    RIGHT:  Normal compressibility of the RIGHT common femoral, femoral and popliteal   veins.  Doppler examination shows normal spontaneous and phasic flow.  No RIGHT calf vein thrombosis is detected.    LEFT:  Normal compressibility of the LEFT common femoral, femoral and popliteal   veins.  Doppler examination shows normal spontaneous and phasic flow.  No LEFT calf vein thrombosis is detected.    IMPRESSION:  No evidence of deep venous thrombosis in either lower extremity.      --- End of Report ---    LAUREN GUERRERO MD; Attending Radiologist  This document has been electronically signed. Dec  4 2022  1:28PM    < end of copied text >      ORT Score -   Family Hx of substance abuse	Female	      Male  Alcohol 	                                           1                     3  Illegal drugs	                                   2                     3  Rx drugs                                           4 	                  4  Personal Hx of substance abuse		  Alcohol 	                                          3	                  3  Illegal drugs                                     4	                  4  Rx drugs                                            5 	                  5  Age between 16- 45 years	           1                     1  hx preadolescent sexual abuse	   3 	                  0  Psychological disease		  ADD, OCD, bipolar, schizophrenia   2	          2  Depression                                           1 	          1  Total: 0    a score of 3 or lower indicates low risk for opioid abuse		  a score of 4-7 indicates moderate risk for opioid abuse		  a score of 8 or higher indicates high risk for opioid abuse    REVIEW OF SYSTEMS:  CONSTITUTIONAL: No fever + fatigue  HEENT:  No difficulty hearing, no change in vision  NECK: No pain or stiffness  RESPIRATORY: No cough, wheezing, chills or hemoptysis; + occasional shortness of breath on exertion   CARDIOVASCULAR: No chest pain, palpitations, dizziness, or leg swelling + left chest wall pacemaker  GASTROINTESTINAL: + loss of appetite, decreased PO intake. + weight loss; + occasional abdominal pain. + bloating; No nausea, vomiting; No diarrhea + constipation.   GENITOURINARY: No dysuria, frequency, hematuria, retention or incontinence + solitary right kidney, with nephrostomy   GYN: + vaginal bleeding  MUSCULOSKELETAL: + chronic low back and flank pain; No joint swelling; + generalized upper and lower motor strength weakness, no saddle anesthesia, bowel/bladder incontinence, no falls   NEURO: No headaches, No numbness/tingling b/l LE, No weakness    PHYSICAL EXAM:  GENERAL:  + fatigued & Oriented X4, cooperative, NAD, Good concentration. Speech is clear. + cachectic   RESPIRATORY: Respirations even and unlabored. Clear to auscultation bilaterally; No rales, rhonchi, wheezing, or rubs + O2 2L NC   CARDIOVASCULAR: Normal S1/S2, regular rate and rhythm; No murmurs, rubs, or gallops. No JVD. + Left chest wall pacemaker   GASTROINTESTINAL:  Taut, Nontender, Nondistended; Bowel sounds present  GENITOURINARY: right flank nephrostomy tube draining clear yellow urine, dressing c/d/i   PERIPHERAL VASCULAR:  Extremities warm without edema. 2+ Peripheral Pulses, No cyanosis, No calf tenderness  MUSCULOSKELETAL: Motor Strength 4/5 B/L upper and lower extremities; moves all extremities equally against gravity; ROM intact; + lumbar back and flank tenderness on palpation. No paraspinal tenderness.   SKIN: Warm, dry, intact. No rashes, lesions, scars or wounds.     Risk factors associated with adverse outcomes related to opioid treatment  [ ]  Concurrent benzodiazepine use  [ ]  History/ Active substance use or alcohol use disorder  [ ] Psychiatric co-morbidity  [ ] Sleep apnea  [ ] COPD  [ ] BMI> 35  [ ] Liver dysfunction  [X ] Renal dysfunction  [ ] CHF  [ ] Smoker  [ ]  Age > 60 years    [X ]  [place]  Reviewed and Copied to Chart. See below.    Plan of care and goal oriented pain management treatment options were discussed with patient and /or primary care giver; all questions and concerns were addressed and care was aligned with patient's wishes.    Educated patient on goal oriented pain management treatment options     12/17/22

## 2022-12-17 NOTE — PROGRESS NOTE ADULT - ASSESSMENT
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 Patient states that she developed sudden onset dyspnea 2 days ago at rest. She was recently admitted at [facility] in October 2022 for hemoptysis and CT chest at that time showed Numerous bilateral pulmonary metastases; mild interval increase in size of a few of the largest pulmonary metastases compared to 9/20/202. Hospital course at that time was complicated by a drop in Hb for which she received blood transfusion. She also tested positive for COVID 19 but was asymptomatic. Pt has a chronic nephrostomy tube that was placed at [facility] for right hydronephrosis. She follows with Dr Kan at [facility]. Pt last received chemotherapy in may of 2022.  The ID consult requested today, 12/3/22, to assist with evaluation of UTI.    # Recurrent  UTIs- Urine cx form 12/4 has no growth   # Leukocytosis -resolving  # Metastatic Bladder cancer - s/p  chemo on May, 2022    would recommend:    1. Monitor OFF Abx as she completed the course   2. Bowel regimen for constipation  3. Pain management as needed  4.  Monitor kidney function     - stable from ID stand point    Attending Attestation:    Spent more than 35 minutes on total encounter, more than 50 % of the visit was spent counseling and/or coordinating care by the Attending physician.

## 2022-12-17 NOTE — PROGRESS NOTE ADULT - ASSESSMENT
Confidential Drug Utilization Report  Search Terms: Zoë Bell, 1965Search Date: 12/05/2022 09:13:40 AM  The Drug Utilization Report below displays all of the controlled substance prescriptions, if any, that your patient has filled in the last twelve months. The information displayed on this report is compiled from pharmacy submissions to the Department, and accurately reflects the information as submitted by the pharmacies.    This report was requested by: Pat Mora | Reference #: [id number]    You have not added a ABI number. Keeping your ABI number(s) up to date on the My ABI # page will enable the separation of your prescriptions from others in the search results.    Others' Prescriptions  Patient Name: Zoë BellBirth Date: 1965  Address: [address] [place], [place] [address]Jxo: Female  Rx Written	Rx Dispensed	Drug	Quantity	Days Supply	Prescriber Name	Prescriber Abi #	Payment Method	Dispenser  11/26/2022	11/26/2022	morphine sulf 10 mg/5 ml soln	430ml	20	Duke Meng M	[id number]	[id number]  11/15/2022	11/21/2022	morphine sulf 10 mg/5 ml soln	75ml	5	Ash Garcia	[id number]	Medicare	[id number]    Patient Name: Zoë BellBirth Date: 1965  Address: [address] [place], [place] [address]Ckl: Female  Rx Written	Rx Dispensed	Drug	Quantity	Days Supply	Prescriber Name	Prescriber Abi #	Payment Method	Dispenser  11/15/2022	11/15/2022	morphine sulf 10 mg/5 ml soln	75ml	5	Ash Garcia	[id number]	[facility]    Patient Name: Zoë BellBirth Date: 1965  Address: [address] [facility] [address] [place], [place] [address]Mdn: Female  Rx Written	Rx Dispensed	Drug	Quantity	Days Supply	Prescriber Name	Prescriber Abi #	Payment Method	Dispenser  10/04/2022	10/15/2022	tramadol hcl 50 mg tablet	10	5	Jai Hughes [id number]	Medicare	[facility] [id number]  08/26/2022	09/01/2022	tramadol hcl 50 mg tablet	20	5	Timi Patel	[id number]	Medicare	[facility] [id number]  08/26/2022	09/01/2022	diazepam 5 mg tablet	15	5	Timi Patel	[id number]	Medicare	[facility] [id number]  * - Drugs marked with an asterisk are compound drugs. If the compound drug is made up of more than one controlled substance, then each controlled substance will be a separate row in the table.

## 2022-12-17 NOTE — PROGRESS NOTE ADULT - SUBJECTIVE AND OBJECTIVE BOX
Patient is seen and examined at the bed side, is afebrile. The leukocytosis trended down to normal.       REVIEW OF SYSTEMS: All other review systems are negative      ALLERGIES: No Known Allergies      Vital Signs Last 24 Hrs  T(C): 36.9 (17 Dec 2022 21:36), Max: 37 (17 Dec 2022 14:13)  T(F): 98.4 (17 Dec 2022 21:36), Max: 98.6 (17 Dec 2022 14:13)  HR: 78 (17 Dec 2022 21:36) (65 - 82)  BP: 99/64 (17 Dec 2022 21:36) (95/64 - 105/63)  BP(mean): --  RR: 17 (17 Dec 2022 21:36) (17 - 18)  SpO2: 100% (17 Dec 2022 21:36) (99% - 100%)    Parameters below as of 17 Dec 2022 21:36  Patient On (Oxygen Delivery Method): nasal cannula  O2 Flow (L/min): 2        PHYSICAL EXAM:  GENERAL: Not in acute distress , on oxygen via NC  CHEST/LUNG:  Not using accessory muscles   HEART: s1 and s2 present  ABDOMEN:  Nontender and  Nondistended  EXTREMITIES: No pedal  edema  CNS: Awake and Alert        LABS:                         8.4    11.84 )-----------( 347      ( 17 Dec 2022 05:40 )             28.4                9.3    17.67 )-----------( 319      ( 09 Dec 2022 05:32 )             31.1         12-17    134<L>  |  101  |  17  ----------------------------<  101<H>  4.1   |  26  |  0.69    Ca    10.6<H>      17 Dec 2022 05:40    TPro  6.9  /  Alb  2.1<L>  /  TBili  0.3  /  DBili  x   /  AST  24  /  ALT  21  /  AlkPhos  263<H>  12-17 12-13    135  |  99  |  17  ----------------------------<  101<H>  4.1   |  26  |  0.81    Ca    11.0<H>      13 Dec 2022 07:20    TPro  7.1  /  Alb  1.8<L>  /  TBili  0.3  /  DBili  x   /  AST  22  /  ALT  17  /  AlkPhos  230<H>  12-13 12-12    135  |  99  |  16  ----------------------------<  93  3.9   |  26  |  0.74    Ca    10.8<H>      12 Dec 2022 05:35    TPro  6.2  /  Alb  x   /  TBili  x   /  DBili  x   /  AST  x   /  ALT  x   /  AlkPhos  x   12-09    PT/INR - ( 01 Dec 2022 16:25 )   PT: 15.4 sec;   INR: 1.29 ratio      PTT - ( 01 Dec 2022 16:25 )  PTT:24.9 sec        MEDICATIONS  (STANDING):    enoxaparin Injectable 40 milliGRAM(s) SubCutaneous every 24 hours  ferrous    sulfate 325 milliGRAM(s) Oral daily  folic acid 1 milliGRAM(s) Oral daily  lactulose Syrup 15 Gram(s) Oral every other day  lidocaine   4% Patch 1 Patch Transdermal every 24 hours  pantoprazole    Tablet 40 milliGRAM(s) Oral every 12 hours  polyethylene glycol 3350 17 Gram(s) Oral daily  senna 2 Tablet(s) Oral at bedtime        RADIOLOGY & ADDITIONAL TESTS:    12/1/22 : Xray Chest 1 View- PORTABLE-Urgent (Xray Chest 1 View- PORTABLE-Urgent .) (12.01.22 @ 18:12) >    Left ICD. Normal heart mediastinum. Hyperinflated lungs. Multiple parenchymal nodules of varying sizes reidentified similar to prior   consistent with metastatic disease. No acute infiltrate pleural effusion  or pneumothorax.    IMPRESSION: No acute infiltrate. pulmonary metastatic nodules similar to  prior        MICROBIOLOGY DATA:    Culture - Blood (12.04.22 @ 12:36)   Specimen Source: .Blood Blood   Culture Results: No growth to date.     Culture - Urine (12.04.22 @ 04:43)   Specimen Source: Clean Catch Clean Catch (Midstream)   Culture Results: <10,000 CFU/mL Normal Urogenital Vandana     Urine Microscopic-Add On (NC) (12.04.22 @ 04:43)   Red Blood Cell - Urine: >50 /HPF   White Blood Cell - Urine: >50 /HPF   Bacteria: Many /HPF   Epithelial Cells: Few /HPF       COVID-19 PCR (12.01.22 @ 16:25)   COVID-19 PCR: NotDetec:

## 2022-12-17 NOTE — PROGRESS NOTE ADULT - PROBLEM SELECTOR PLAN 1
Pt with chronic abdominal and b/l flank pain which is somatic and neuropathic in nature due to metastatic bladder CA to lungs and right nephrostomy tube (changed on 12/7). + opioid dependence. + occasional gas discomfort. + constipation + occasional vaginal bleeding  Opioid pain recommendations   - Continue Morphine sulf solution 5mg PO q3h PRN severe pain. Monitor for respiratory depression/sedation.    Non-opioid pain recommendations   - Avoid NSIADs- pt with solitary kidney  - Continue Acetaminophen 325 mg PO q 4 hours PRN moderate pain. Monitor LFT (dose per pt request)  - Continue Simethicone 80mg PO q6h PRN gas pain.  - Continue lidocaine 4% patch daily.   Bowel Regimen  - Continue Miralax 17G PO daily  - Continue lactulose 15G PO every other day per primary team  - Continue dulcolax 10mg supp PRN constipation   Mild pain   - Non-pharmacological pain treatment recommendations  - Warm/ Cool packs PRN   - Repositioning, imagery, relaxation, distraction.  - Physical therapy OOB if no contraindications   Recommendations discussed with primary team and RN.

## 2022-12-17 NOTE — PROGRESS NOTE ADULT - ASSESSMENT
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      Problem/Plan - 1:  ·  Problem: dyspnea.   ·  Plan: Chest xray showing metastatic disease unchanged from prior : most likely cause of dyspnea ( although anemia contributing)   high risk for DVT/PE but with contraindication for AC due to active chronic vaginal bleeding due to cancer and patient has been saturating well and not tachycardic   home medication morphine for dyspnea and pain.  as above asking for home O2 and trying to set up on her own , stating she was approved for it before !!      Problem/Plan - 2:  ·  Problem: Anemia.   post 2 Units PRBC with good results   CBC stable.   hem following       12-02-22 @ 05:45  Iron:     16 ug/dL  Iron %:   9 %  TIBC:     180 ug/dL  iron supplement   ( could not get IV iron as does not have IV access)     *** unclear cause of leukocytosis ... ? reactive    monitor for now     improved      Problem/Plan - 3:  ·  Problem: Vaginal bleeding.   ·  Plan: hx of vaginal bleeding, declined obgyn assessment at [facility]  Hx of bladder cancer likely causing bleeding   pt seen by GYN at [facility]: no intervention possible at this time..   pt will need treatment for cancer      Problem/Plan - 4:  ·  Problem: Hyponatremia. improved  encourage PO intake      Problem/Plan - 5:  ·  Problem: Bladder cancer.   ·  Plan: Hx of Bladder Ca with mets  last chemotherapy in may of 2022.  hem onc following  palliative appreciated   continue current pain mgmt  patient very resistant in trying new medications    **nephrostomy tube overdue for exchange>>> changed by IR     worsened leukocytosis post pricedure / likely reactive ?     ** pt being treated by ID for recurrent UTIS !      monitor WBC    ** hypercalcemia     appreciated Hem onc eval    pt declined treatment but ended up taking the bisphosphonates      monitor     Problem/Plan - 6:  ·  Problem: Prophylactic measure.   ·  Plan: SCD  PPI.  S/S eval appreciated  patient declined taking antiacid meds / famotidine / PPI    Dispo planing home  patient not interested in hospice   supportive care   OP Onc follow up   patient medically stable for DC

## 2022-12-17 NOTE — PROGRESS NOTE ADULT - SUBJECTIVE AND OBJECTIVE BOX
Date of Service  : 12-17-22     INTERVAL HPI/OVERNIGHT EVENTS: Seen and examined earlier . I feel same.   Vital Signs Last 24 Hrs  T(C): 36.9 (17 Dec 2022 21:36), Max: 37 (17 Dec 2022 14:13)  T(F): 98.4 (17 Dec 2022 21:36), Max: 98.6 (17 Dec 2022 14:13)  HR: 78 (17 Dec 2022 21:36) (65 - 82)  BP: 99/64 (17 Dec 2022 21:36) (95/64 - 105/63)  BP(mean): --  RR: 17 (17 Dec 2022 21:36) (17 - 18)  SpO2: 100% (17 Dec 2022 21:36) (99% - 100%)    Parameters below as of 17 Dec 2022 21:36  Patient On (Oxygen Delivery Method): nasal cannula  O2 Flow (L/min): 2    I&O's Summary    MEDICATIONS  (STANDING):  enoxaparin Injectable 40 milliGRAM(s) SubCutaneous every 24 hours  ferrous    sulfate 325 milliGRAM(s) Oral daily  folic acid 1 milliGRAM(s) Oral daily  lactulose Syrup 15 Gram(s) Oral every other day  lidocaine   4% Patch 1 Patch Transdermal every 24 hours  pantoprazole    Tablet 40 milliGRAM(s) Oral every 12 hours  polyethylene glycol 3350 17 Gram(s) Oral daily  senna 2 Tablet(s) Oral at bedtime    MEDICATIONS  (PRN):  acetaminophen     Tablet .. 325 milliGRAM(s) Oral every 4 hours PRN Moderate Pain (4 - 6)  aluminum hydroxide/magnesium hydroxide/simethicone Suspension 30 milliLiter(s) Oral every 4 hours PRN Dyspepsia  bisacodyl Suppository 10 milliGRAM(s) Rectal daily PRN Constipation  diphenhydrAMINE 25 milliGRAM(s) Oral every 4 hours PRN Itching  morphine   Solution 5 milliGRAM(s) Oral every 3 hours PRN Severe Pain (7 - 10)  simethicone 80 milliGRAM(s) Chew every 6 hours PRN Gas    LABS:                        8.4    11.84 )-----------( 347      ( 17 Dec 2022 05:40 )             28.4     12-17    134<L>  |  101  |  17  ----------------------------<  101<H>  4.1   |  26  |  0.69    Ca    10.6<H>      17 Dec 2022 05:40    TPro  6.9  /  Alb  2.1<L>  /  TBili  0.3  /  DBili  x   /  AST  24  /  ALT  21  /  AlkPhos  263<H>  12-17          Consultant(s) Notes Reviewed:  [x ] YES  [ ] NO    PHYSICAL EXAM:  GENERAL: NAD, cachetic ,not in any distress ,  HEAD:  Atraumatic, Normocephalic  NECK: Supple, No JVD, Normal thyroid  NERVOUS SYSTEM:  Alert & Oriented X3, No focal deficit   CHEST/LUNG: Good air entry bilateral with no  rales, rhonchi, wheezing, or rubs  HEART: Regular rate and rhythm; No murmurs, rubs, or gallops  ABDOMEN: Soft, Nontender, Nondistended; Bowel sounds present  EXTREMITIES:  2+ Peripheral Pulses, No clubbing, cyanosis, or edema    Care Discussed with Consultants/Other Providers [ x] YES  [ ] NO

## 2022-12-18 NOTE — PROGRESS NOTE ADULT - ASSESSMENT
_________________________________________________________________________________________  ========>>  M E D I C A L   A T T E N D I N G    F O L L O W  U P  N O T E  <<=========  -----------------------------------------------------------------------------------------------------    - Patient seen and examined by me earlier today.   - In summary,  JEF HOUSE is a 56y year old woman : was discharged last week, going through the appeals process ( second level.. )   - Patient today overall doing fairly comfortable, eating OK.     per patient not getting morphine because BP has been low .. discussed with RN >> will be using a pediatric cuff as patient very thin.. and will be getting her pain meds..     ==================>> REVIEW OF SYSTEM <<=================    GEN: no fever, no chills, stable pain   RESP: states is SOB ( but always saturating in the 90s) , no cough, no sputum  CVS: no chest pain, no palpitations   GI: no abdominal pain, no nausea  : no dysuria, + chronic hematuria, chronic on and off vaginal bleed   Neuro: no headache, no dizziness  Derm : no itching, no rash    ==================>> PHYSICAL EXAM <<=================    GEN: A&O X 3 , NAD , comfortable, pleasant, calm in bed, cachectic   HEENT: NCAT, PERRL, MMM, hearing intact  Neck: supple , no JVD appreciated  CVS: S1S2 , regular , No M/R/G appreciated  PULM: CTA B/L,  no W/R/R appreciated  ABD.: soft. non tender, non distended,  bowel sounds present  Extrem: intact pulses , no edema      nephrostomy tube in place with clear yellow urine   PSYCH : normal mood,  not anxious                            ( Note Written / Date of service :  12-18-22 )    ==================>> MEDICATIONS <<====================    MEDICATIONS  (STANDING):  enoxaparin Injectable 40 milliGRAM(s) SubCutaneous every 24 hours  ferrous    sulfate 325 milliGRAM(s) Oral daily  folic acid 1 milliGRAM(s) Oral daily  lactulose Syrup 15 Gram(s) Oral every other day  lidocaine   4% Patch 1 Patch Transdermal every 24 hours  pantoprazole    Tablet 40 milliGRAM(s) Oral every 12 hours  polyethylene glycol 3350 17 Gram(s) Oral daily  senna 2 Tablet(s) Oral at bedtime    MEDICATIONS  (PRN):  acetaminophen     Tablet .. 325 milliGRAM(s) Oral every 4 hours PRN Moderate Pain (4 - 6)  aluminum hydroxide/magnesium hydroxide/simethicone Suspension 30 milliLiter(s) Oral every 4 hours PRN Dyspepsia  bisacodyl Suppository 10 milliGRAM(s) Rectal daily PRN Constipation  diphenhydrAMINE 25 milliGRAM(s) Oral every 4 hours PRN Itching  morphine   Solution 5 milliGRAM(s) Oral every 3 hours PRN Severe Pain (7 - 10)  simethicone 80 milliGRAM(s) Chew every 6 hours PRN Gas    ___________  Active diet:  Diet, Regular:   Supplement Feeding Modality:  Oral  Ensure Enlive Cans or Servings Per Day:  1       Frequency:  Three Times a day  ___________________    ==================>> VITAL SIGNS <<==================    T(C): 36.6 (12-18-22 @ 08:15), Max: 37 (12-17-22 @ 14:13)  HR: 78 (12-18-22 @ 13:09) (65 - 89)  BP: 91/57 (12-18-22 @ 13:09) (91/57 - 111/57)  RR: 18 (12-18-22 @ 08:15) (17 - 18)  SpO2: 96% (12-18-22 @ 08:15) (96% - 100%)     I&O's Summary    17 Dec 2022 07:01  -  18 Dec 2022 07:00  --------------------------------------------------------  IN: 0 mL / OUT: 300 mL / NET: -300 mL     ==================>> LAB AND IMAGING <<==================                        8.4    11.84 )-----------( 347      ( 17 Dec 2022 05:40 )             28.4        12-17    134<L>  |  101  |  17  ----------------------------<  101<H>  4.1   |  26  |  0.69    Ca    10.6<H>      17 Dec 2022 05:40    TPro  6.9  /  Alb  2.1<L>  /  TBili  0.3  /  DBili  x   /  AST  24  /  ALT  21  /  AlkPhos  263<H>  12-17     TSH:      1.51   (10-18-22)           ___________________________________________________________________________________  ===============>>  A S S E S S M E N T   A N D   P L A N <<===============  ------------------------------------------------------------------------------------------    56 year old female with PMHx of Asthma, Anemia, Bladder cancer with metastases to the lungs, Bradycardia, Hypertension, Dyslipidemia, Hydronephrosis, R-nephrostomy tube, Liver cyst, Parathyroid tumor, Chronic pain and 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     patient remains medically stable for DC  patient does not qualify for O2 supplement : son in in process of buying it on his own as per patient   continue pain mgmt  Continue Current medications otherwise and monitor.  pt not interested in palliation / hospice and has appointment with oncology next week     --------------------------------------------  Case discussed with patient, RN  Education given on findings and plan of care  ___________________________  H. NIEVES Ochoa.  Pager: [phone]

## 2022-12-19 NOTE — PROGRESS NOTE ADULT - SUBJECTIVE AND OBJECTIVE BOX
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 Patient states that she developed sudden onset dyspnea 2 days ago at rest. The dyspnoea is not alleviated or worsened by any factors. Pt also endorses vaginal bleeding for which she was evaluated by OBGYN at [facility] and was recommended further work up including TVUS but patient refused. Patient denies any chest pain. She was recently admitted at [facility] in October 2022 for hemoptysis and CT chest at that time showed Numerous bilateral pulmonary metastases; mild interval increase in size of a few of the largest pulmonary metastases compared to 9/20/202. Hospital course at that time was complicated by a drop in Hb for which she received blood transfusion. She also tested positive for COVID 19 but was asymptomatic. Pt has a chronic nephrostomy tube that was placed at [facility] for right hydronephrosis. She follows with Dr Kan at [facility]. Pt last recieved chemotherapy in may of 2022. She denies any fevers, chest pain, abd pain.    In ED VS: T 98, Hb 7.8, WBC 15, Na 132   (01 Dec 2022 19:07)      This is a Patient is a 56y old  Female who presents with a chief complaint of dyspnea (19 Dec 2022 08:39)  . Pt diagnosed with ... Pain consulted for ...Pt seen and examined at bedside. Pt reports PAIN SCORE:  *** SCALE USED: (1-10 VNRS). Pain adequately managed on current pain regimen. Pt describes pain as .... radiating to... alleviated by pain medications... exacerbated by movement.... Pt tolerating PO diet. Pt denies lethargy, nausea, vomiting, constipation and itchiness. Reports last BM ***. Patient stated goal for pain control: to be able to take deep breaths, get out of bed to chair and ambulate with tolerable pain control.     PAST MEDICAL & SURGICAL HISTORY:  Anemia      Asthma      HLD (hyperlipidemia)      Pacemaker  [employer]      Bladder cancer      HTN (hypertension)      Parathyroid tumor      Liver cyst      Hydronephrosis  has right Nephrostomy tube - dressing intact      Bradycardia      History of renal calculi      Birthmark      H/O myomectomy      Presence of cardiac pacemaker      H/O hydronephrosis  s/p Right Perc nephrostomy tube placement 02/2021, exchange 06/2021          FAMILY HISTORY:  Family history of prostate cancer (Father)    Family history of CHF (congestive heart failure) (Father)    Family history of atrial fibrillation (Father)        Social History:  Denies any smoking, etoh , or drug use.o (01 Dec 2022 19:07)   [ ]Denies ETOH use, illicit drug use, and smoking     Allergies    No Known Allergies    Intolerances        MEDICATIONS  (STANDING):  acetaminophen     Tablet .. 325 milliGRAM(s) Oral every 4 hours  enoxaparin Injectable 40 milliGRAM(s) SubCutaneous every 24 hours  ferrous    sulfate 325 milliGRAM(s) Oral daily  folic acid 1 milliGRAM(s) Oral daily  lactulose Syrup 15 Gram(s) Oral every other day  lidocaine   4% Patch 1 Patch Transdermal every 24 hours  pantoprazole    Tablet 40 milliGRAM(s) Oral every 12 hours  polyethylene glycol 3350 17 Gram(s) Oral daily  senna 2 Tablet(s) Oral at bedtime    MEDICATIONS  (PRN):  aluminum hydroxide/magnesium hydroxide/simethicone Suspension 30 milliLiter(s) Oral every 4 hours PRN Dyspepsia  bisacodyl Suppository 10 milliGRAM(s) Rectal daily PRN Constipation  diphenhydrAMINE 25 milliGRAM(s) Oral every 4 hours PRN Itching  morphine   Solution 5 milliGRAM(s) Oral every 3 hours PRN Severe Pain (7 - 10)  simethicone 80 milliGRAM(s) Chew every 6 hours PRN Gas      Vital Signs Last 24 Hrs  T(C): 36.4 (19 Dec 2022 05:25), Max: 37.1 (18 Dec 2022 14:45)  T(F): 97.5 (19 Dec 2022 05:25), Max: 98.7 (18 Dec 2022 14:45)  HR: 68 (19 Dec 2022 05:25) (68 - 78)  BP: 110/60 (19 Dec 2022 05:50) (91/57 - 128/77)  BP(mean): --  RR: 19 (19 Dec 2022 05:50) (18 - 19)  SpO2: 99% (19 Dec 2022 05:50) (99% - 100%)    Parameters below as of 19 Dec 2022 05:50  Patient On (Oxygen Delivery Method): nasal cannula  O2 Flow (L/min): 2    COVID-19 PCR: NotDetec (14 Dec 2022 15:00)  COVID-19 PCR: NotDetec (07 Dec 2022 05:43)  COVID-19 PCR: NotDetec (01 Dec 2022 16:25)  COVID-19 PCR: NotDetec (13 Nov 2022 05:42)  COVID-19 PCR: Detected (05 Nov 2022 05:25)  COVID-19 PCR: NotDetec (30 Oct 2022 09:38)  SARS-CoV-2: NotDetec (17 Oct 2022 21:31)  COVID-19 PCR: NotDetec (14 Oct 2022 06:00)  COVID-19 PCR: Detected (12 Oct 2022 06:51)  COVID-19 PCR: Detected (10 Oct 2022 06:47)  COVID-19 PCR: Detected (07 Oct 2022 03:03)  COVID-19 PCR: Detected (04 Oct 2022 18:00)  COVID-19 PCR: Detected (26 Sep 2022 16:05)  COVID-19 PCR: NotDetec (22 Sep 2022 13:40)  COVID-19 PCR: NotDetec (20 Sep 2022 06:00)  COVID-19 PCR: NotDetec (28 Aug 2022 05:15)  COVID-19 PCR: NotDetec (22 Aug 2022 05:36)  COVID-19 PCR: NotDetec (15 Aug 2022 19:55)  SARS-CoV-2: NotDetec (31 Jul 2022 10:35)  COVID-19 PCR: NotDetec (18 Jul 2022 05:09)  COVID-19 PCR: NotDetec (04 Jul 2022 06:00)  COVID-19 PCR: NotDetec (27 Jun 2022 18:11)    LABS: Reviewed                  CAPILLARY BLOOD GLUCOSE        COVID-19 PCR: NotDetec (14 Dec 2022 15:00)  COVID-19 PCR: NotDetec (07 Dec 2022 05:43)  COVID-19 PCR: NotDetec (01 Dec 2022 16:25)  COVID-19 PCR: NotDetec (13 Nov 2022 05:42)  COVID-19 PCR: Detected (05 Nov 2022 05:25)  COVID-19 PCR: NotDetec (30 Oct 2022 09:38)  SARS-CoV-2: NotDetec (17 Oct 2022 21:31)  COVID-19 PCR: NotDetec (14 Oct 2022 06:00)  COVID-19 PCR: Detected (12 Oct 2022 06:51)  COVID-19 PCR: Detected (10 Oct 2022 06:47)  COVID-19 PCR: Detected (07 Oct 2022 03:03)  COVID-19 PCR: Detected (04 Oct 2022 18:00)  COVID-19 PCR: Detected (26 Sep 2022 16:05)  COVID-19 PCR: NotDetec (22 Sep 2022 13:40)  COVID-19 PCR: NotDetec (20 Sep 2022 06:00)  COVID-19 PCR: NotDetec (28 Aug 2022 05:15)  COVID-19 PCR: NotDetec (22 Aug 2022 05:36)  COVID-19 PCR: NotDetec (15 Aug 2022 19:55)  SARS-CoV-2: NotDetec (31 Jul 2022 10:35)  COVID-19 PCR: NotDetec (18 Jul 2022 05:09)  COVID-19 PCR: NotDetec (04 Jul 2022 06:00)  COVID-19 PCR: NotDetec (27 Jun 2022 18:11)      Radiology: Reviewed    ORT Score -   Family Hx of substance abuse	Female	      Male  Alcohol 	                                           1                     3  Illegal drugs	                                   2                     3  Rx drugs                                           4 	                  4  Personal Hx of substance abuse		  Alcohol 	                                          3	                  3  Illegal drugs                                     4	                  4  Rx drugs                                            5 	                  5  Age between 16- 45 years	           1                     1  hx preadolescent sexual abuse	   3 	                  0  Psychological disease		  ADD, OCD, bipolar, schizophrenia   2	          2  Depression                                           1 	          1  Total: 0    a score of 3 or lower indicates low risk for opioid abuse		  a score of 4-7 indicates moderate risk for opioid abuse		  a score of 8 or higher indicates high risk for opioid abuse    REVIEW OF SYSTEMS:  CONSTITUTIONAL: No fever or fatigue  HEENT:  No difficulty hearing, no change in vision  NECK: No pain or stiffness  RESPIRATORY: No cough, wheezing, chills or hemoptysis; No shortness of breath  CARDIOVASCULAR: No chest pain, palpitations, dizziness, or leg swelling  GASTROINTESTINAL: No loss of appetite, decreased PO intake. No abdominal or epigastric pain. No nausea, vomiting; No diarrhea or constipation.   GENITOURINARY: No dysuria, frequency, hematuria, retention or incontinence  MUSCULOSKELETAL: No joint pain or swelling; No muscle, back, or extremity pain, no upper or lower motor strength weakness, no saddle anesthesia, bowel/bladder incontinence, no falls   NEURO: No headaches, No numbness/tingling b/l LE, No weakness  ENDOCRINE: No polyuria, polydipsia, heat or cold intolerance; No hair loss  PSYCHIATRIC: No depression, anxiety or difficulty sleeping    PHYSICAL EXAM:  GENERAL:  Alert & Oriented X4, cooperative, NAD, Good concentration. Speech is clear.   RESPIRATORY: Respirations even and unlabored. Clear to auscultation bilaterally; No rales, rhonchi, wheezing, or rubs  CARDIOVASCULAR: Normal S1/S2, regular rate and rhythm; No murmurs, rubs, or gallops. No JVD.   GASTROINTESTINAL:  Soft, Nontender, Nondistended; Bowel sounds present  PERIPHERAL VASCULAR:  Extremities warm without edema. 2+ Peripheral Pulses, No cyanosis, No calf tenderness  MUSCULOSKELETAL: Motor Strength 5/5 B/L upper and lower extremities; moves all extremities equally against gravity; ROM intact; negative SLR; No tenderness on palpation of all joints.   SKIN: Warm, dry, intact. No rashes, lesions, scars or wounds.     Risk factors associated with adverse outcomes related to opioid treatment  [ ]  Concurrent benzodiazepine use  [ ]  History/ Active substance use or alcohol use disorder  [ ] Psychiatric co-morbidity  [ ] Sleep apnea  [ ] COPD  [ ] BMI> 35  [ ] Liver dysfunction  [ ] Renal dysfunction  [ ] CHF  [ ] Smoker  [ ]  Age > 60 years    [ ]  [facility]  Reviewed and Copied to Chart. See below.    Plan of care and goal oriented pain management treatment options were discussed with patient and /or primary care giver; all questions and concerns were addressed and care was aligned with patient's wishes.    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 Patient states that she developed sudden onset dyspnea 2 days ago at rest. The dyspnoea is not alleviated or worsened by any factors. Pt also endorses vaginal bleeding for which she was evaluated by OBGYN at [facility] and was recommended further work up including TVUS but patient refused. Patient denies any chest pain. She was recently admitted at [facility] in October 2022 for hemoptysis and CT chest at that time showed Numerous bilateral pulmonary metastases; mild interval increase in size of a few of the largest pulmonary metastases compared to 9/20/202. Hospital course at that time was complicated by a drop in Hb for which she received blood transfusion. She also tested positive for COVID 19 but was asymptomatic. Pt has a chronic nephrostomy tube that was placed at [facility] for right hydronephrosis. She follows with Dr Kan at [facility]. Pt last recieved chemotherapy in may of 2022. She denies any fevers, chest pain, abd pain.    In ED VS: T 98, Hb 7.8, WBC 15, Na 132   (01 Dec 2022 19:07)    Pt is admitted for SOB, anemia. Received 2 units PRBC on 12/1 and 12/2. Pt with metastatic bladder CA to lungs, on chronic opioids. + right nephrostomy (nephrostomy tube changed on 12/7). On morphine 5mg PO solution at home q4h PRN per istop. Pt seen and examined at bedside this morning. Pt found sitting in bed, reports 10/10 SCALE USED: (1-10 VNRS) pain on lower back, patient started sobbing, c/o that she is not getting pain medication as ordered for pain due to hypotension. Emotional support provided. RN informed to medicate patient. Pt describes pain as constant, throbbing, radiating throughout lumbar back, alleviated by pain medication, exacerbated by movement and palpation. Pt states that if take morphine as ordered pain would be better control. Pt also reports abdominal pain 6/10, generalized. Reports last BM yesterday, 12/18. Pt on bowel regimen. Pt tolerating PO diet, however has poor appetite and weight loss. Reports SOB on exertion, 2L NC in use. Denies lethargy, constipation, reports + vaginal bleeding. Denies chest pain, nausea, vomiting. Patient stated goal for pain control: to be able to take deep breaths, get out of bed to chair and ambulate with tolerable pain control. Pt able to ambulate to bathroom id pain controlled. Per pt, plan for oncology followup outpatient, has an appointment on 12/23.     PAST MEDICAL & SURGICAL HISTORY:  Anemia    Asthma    HLD (hyperlipidemia)    Pacemaker  [employer]    Bladder cancer    HTN (hypertension)    Parathyroid tumor    Liver cyst    Hydronephrosis  has right Nephrostomy tube - dressing intact    Bradycardia    History of renal calculi    Birthmark    H/O myomectomy    Presence of cardiac pacemaker    H/O hydronephrosis    s/p Right Perc nephrostomy tube placement 02/2021, exchange 06/2021    FAMILY HISTORY:  Family history of prostate cancer (Father)    Family history of CHF (congestive heart failure) (Father)    Family history of atrial fibrillation (Father)    Social History:  Denies any smoking, etoh , or drug use.o (01 Dec 2022 19:07)   [x]Denies ETOH use, illicit drug use, and smoking     Allergies    No Known Allergies    Intolerances    MEDICATIONS  (STANDING):  acetaminophen     Tablet .. 325 milliGRAM(s) Oral every 4 hours  enoxaparin Injectable 40 milliGRAM(s) SubCutaneous every 24 hours  ferrous    sulfate 325 milliGRAM(s) Oral daily  folic acid 1 milliGRAM(s) Oral daily  lactulose Syrup 15 Gram(s) Oral every other day  lidocaine   4% Patch 1 Patch Transdermal every 24 hours  pantoprazole    Tablet 40 milliGRAM(s) Oral every 12 hours  polyethylene glycol 3350 17 Gram(s) Oral daily  senna 2 Tablet(s) Oral at bedtime    MEDICATIONS  (PRN):  aluminum hydroxide/magnesium hydroxide/simethicone Suspension 30 milliLiter(s) Oral every 4 hours PRN Dyspepsia  bisacodyl Suppository 10 milliGRAM(s) Rectal daily PRN Constipation  diphenhydrAMINE 25 milliGRAM(s) Oral every 4 hours PRN Itching  morphine   Solution 5 milliGRAM(s) Oral every 3 hours PRN Severe Pain (7 - 10)  simethicone 80 milliGRAM(s) Chew every 6 hours PRN Gas    Vital Signs Last 24 Hrs  T(C): 36.4 (19 Dec 2022 05:25), Max: 37.1 (18 Dec 2022 14:45)  T(F): 97.5 (19 Dec 2022 05:25), Max: 98.7 (18 Dec 2022 14:45)  HR: 68 (19 Dec 2022 05:25) (68 - 78)  BP: 110/60 (19 Dec 2022 05:50) (91/57 - 128/77)  BP(mean): --  RR: 19 (19 Dec 2022 05:50) (18 - 19)  SpO2: 99% (19 Dec 2022 05:50) (99% - 100%)    Parameters below as of 19 Dec 2022 05:50  Patient On (Oxygen Delivery Method): nasal cannula  O2 Flow (L/min): 2    COVID-19 PCR: NotDetec (14 Dec 2022 15:00)  COVID-19 PCR: NotDetec (07 Dec 2022 05:43)  COVID-19 PCR: NotDetec (01 Dec 2022 16:25)  COVID-19 PCR: NotDetec (13 Nov 2022 05:42)  COVID-19 PCR: Detected (05 Nov 2022 05:25)  COVID-19 PCR: NotDetec (30 Oct 2022 09:38)  SARS-CoV-2: NotDetec (17 Oct 2022 21:31)  COVID-19 PCR: NotDetec (14 Oct 2022 06:00)  COVID-19 PCR: Detected (12 Oct 2022 06:51)  COVID-19 PCR: Detected (10 Oct 2022 06:47)  COVID-19 PCR: Detected (07 Oct 2022 03:03)  COVID-19 PCR: Detected (04 Oct 2022 18:00)  COVID-19 PCR: Detected (26 Sep 2022 16:05)  COVID-19 PCR: NotDetec (22 Sep 2022 13:40)  COVID-19 PCR: NotDetec (20 Sep 2022 06:00)  COVID-19 PCR: NotDetec (28 Aug 2022 05:15)  COVID-19 PCR: NotDetec (22 Aug 2022 05:36)  COVID-19 PCR: NotDetec (15 Aug 2022 19:55)  SARS-CoV-2: NotDetec (31 Jul 2022 10:35)  COVID-19 PCR: NotDetec (18 Jul 2022 05:09)  COVID-19 PCR: NotDetec (04 Jul 2022 06:00)  COVID-19 PCR: NotDetec (27 Jun 2022 18:11)    LABS: Reviewed    CAPILLARY BLOOD GLUCOSE    COVID-19 PCR: NotDetec (14 Dec 2022 15:00)  COVID-19 PCR: NotDetec (07 Dec 2022 05:43)  COVID-19 PCR: NotDetec (01 Dec 2022 16:25)  COVID-19 PCR: NotDetec (13 Nov 2022 05:42)  COVID-19 PCR: Detected (05 Nov 2022 05:25)  COVID-19 PCR: NotDetec (30 Oct 2022 09:38)  SARS-CoV-2: NotDetec (17 Oct 2022 21:31)  COVID-19 PCR: NotDetec (14 Oct 2022 06:00)  COVID-19 PCR: Detected (12 Oct 2022 06:51)  COVID-19 PCR: Detected (10 Oct 2022 06:47)  COVID-19 PCR: Detected (07 Oct 2022 03:03)  COVID-19 PCR: Detected (04 Oct 2022 18:00)  COVID-19 PCR: Detected (26 Sep 2022 16:05)  COVID-19 PCR: NotDetec (22 Sep 2022 13:40)  COVID-19 PCR: NotDetec (20 Sep 2022 06:00)  COVID-19 PCR: NotDetec (28 Aug 2022 05:15)  COVID-19 PCR: NotDetec (22 Aug 2022 05:36)  COVID-19 PCR: NotDetec (15 Aug 2022 19:55)  SARS-CoV-2: NotDetec (31 Jul 2022 10:35)  COVID-19 PCR: NotDetec (18 Jul 2022 05:09)  COVID-19 PCR: NotDetec (04 Jul 2022 06:00)  COVID-19 PCR: NotDetec (27 Jun 2022 18:11)    Radiology: Reviewed  ACC: [id number] EXAM:  XR FOOT 2 VIEWS LT                          PROCEDURE DATE:  12/12/2022      INTERPRETATION:  Left foot    HISTORY: Pain and swelling     Three views of the left foot show no evidence of fracture nor   destructive change. The joint spaces are maintained.    IMPRESSION: No acute bony pathology.    Thank you for this referral.    --- End of Report ---    RAMOS KINCAID MD; Attending Interventional Radiologist  This document has been electronically signed. Dec 13 2022  4:09PM  ACC: [id number] EXAM:  SP FLUOROSCOPY TO 1 HOUR                        ACC: [id number] EXAM:  SP NEPHROSTOGRM NEW AC RT [facility]                        ACC: [id number] EXAM:  IR PROCEDURE                        ACC: [id number] EXAM:  SP CHNG NEPHRO CATH RT [facility]                          PROCEDURE DATE:  12/07/2022      INTERPRETATION:  PROCEDURE: Right nephrostomy catheter evaluation and   exchange.    : ALEJANDRINA Araya A Palvanov, MD    ANESTHESIA: Intravenous sedation was administered by the attending   anesthesiologist. A total of 1 mL of 1% lidocaine was used for local   anesthesia.    CLINICAL HISTORY: 55 yo female with a history of bladder cancer and   chronic right nephrostomy catheter, last exchanged on 08/22/2022. Patient   presents for an exchange of right nephrostomy tube.    TECHNIQUE: After discussing the risks, benefits and alternatives to the   procedure, informed consent was obtained. A timeout was performed.    Patient was placed prone on the fluoroscopy examination tableand   connected to physiologic monitoring. Examination of the patient's   nephrostomy drain site demonstrated a well-positioned catheter. The right   flank and nephrostomy catheter was prepped and draped in a sterile   fashion. Preliminary fluoroscopy demonstrated a pigtail catheter   overlying the right hemiabdomen with no evidence of kink or fracture. A   nephrostogram was performed, which demonstrated a minimally dilated   collecting system, with a slightly retracted catheter within the lower   pole. The catheter hub was cut and multiple attempts were made to advance   various wires via the catheter into the collecting system, however a wire   was not able to be passed due to encrustation of the catheter. With the   aid of 0.035 Glidewire,the catheter was gently removed under   fluoroscopic observation. A 4 Japanese Berenstein catheter and Glidewire   were used to re-catheterize the right renal pelvis, which was confirmed   with injection of contrast. Subsequently, a stiff 0.035 wire was advanced   into the collecting system and the Berenstein catheter was exchanged over   the wire for a new 10.2 Japanese locking loop pigtail catheter, which was   formed within the right renal pelvis under fluoroscopic observation. A   nephrostogram was performed, confirming catheter positioning. The   catheter was flushed with normal saline, secured to patient's skin with   2-0 nylon suture, and connected to gravity drainage. A dry sterile   dressing was applied.    The patient tolerated the procedure well and was transferred from the   department in stable condition. There were no immediate complications.    IMPRESSION: Successful fluoroscopy-guided exchange of right nephrostomy   catheter, as described above.    The patient was instructed to make a follow-up appointment for routine   exchange in 1-2 months, or sooner if she encounters problems with the   catheter.    --- End of Report ---    PEE TINOCO ,Interventional Radiology  This document has been electronically signed.  URBANO PRABHAKAR MD; Attending Interventional Radiologist  This document has been electronically signed. Dec  9 2022 12:10PM    ORT Score -   Family Hx of substance abuse	Female	      Male  Alcohol 	                                           1                     3  Illegal drugs	                                   2                     3  Rx drugs                                           4 	                  4  Personal Hx of substance abuse		  Alcohol 	                                          3	                  3  Illegal drugs                                     4	                  4  Rx drugs                                            5 	                  5  Age between 16- 45 years	           1                     1  hx preadolescent sexual abuse	   3 	                  0  Psychological disease		  ADD, OCD, bipolar, schizophrenia   2	          2  Depression                                           1 	          1  Total: 0    a score of 3 or lower indicates low risk for opioid abuse		  a score of 4-7 indicates moderate risk for opioid abuse		  a score of 8 or higher indicates high risk for opioid abuse    REVIEW OF SYSTEMS:  CONSTITUTIONAL: No fever + fatigue  HEENT:  No difficulty hearing, no change in vision  NECK: No pain or stiffness  RESPIRATORY: No cough, wheezing, chills or hemoptysis; + occasional shortness of breath on exertion  CARDIOVASCULAR: No chest pain, palpitations, dizziness, or leg swelling + left chest wall pacemaker  GASTROINTESTINAL: + loss of appetite, decreased PO intake. + weight loss; + abdominal pain. No nausea, vomiting; No diarrhea, constipation.   GENITOURINARY: No dysuria, frequency, hematuria, retention or incontinence + solitary right kidney, with nephrostomy tube in place  GYN: + vaginal bleeding  MUSCULOSKELETAL: + chronic low back pain; No joint swelling; + generalized upper and lower motor strength weakness, no saddle anesthesia, bowel/bladder incontinence, no falls   NEURO: No headaches, No numbness/tingling b/l LE, No weakness    PHYSICAL EXAM:  GENERAL:  + fatigued & Oriented X4, cooperative, NAD, Good concentration. Speech is clear. + cachectic   RESPIRATORY: Respirations even and unlabored. Clear to auscultation bilaterally; No rales, rhonchi, wheezing, or rubs + O2 2L NC   CARDIOVASCULAR: Normal S1/S2, regular rate and rhythm; No murmurs, rubs, or gallops. No JVD. + left chest wall pacemaker   GASTROINTESTINAL:+ generalized tenderness; Bowel sounds present  GENITOURINARY: right flank nephrostomy tube draining clear yellow urine, dressing c/d/i   PERIPHERAL VASCULAR:  Extremities warm without edema. 2+ Peripheral Pulses, No cyanosis, No calf tenderness  MUSCULOSKELETAL: Motor Strength 4/5 B/L upper and lower extremities; moves all extremities equally against gravity; ROM intact; + lumbar back tenderness on palpation. No paraspinal tenderness.   SKIN: Warm, dry, intact. No rashes, lesions, scars or wounds.     Risk factors associated with adverse outcomes related to opioid treatment  [ ]  Concurrent benzodiazepine use  [ ]  History/ Active substance use or alcohol use disorder  [ ] Psychiatric co-morbidity  [ ] Sleep apnea  [ ] COPD  [ ] BMI> 35  [ ] Liver dysfunction  [ ] Renal dysfunction  [ ] CHF  [ ] Smoker  [ ]  Age > 60 years    [x]  [place]  Reviewed and Copied to Chart. See below.    Plan of care and goal oriented pain management treatment options were discussed with patient and /or primary care giver; all questions and concerns were addressed and care was aligned with patient's wishes.    Educated patient on goal oriented pain management treatment options     12-19-22 @ 10:51

## 2022-12-19 NOTE — PROGRESS NOTE ADULT - PROBLEM SELECTOR PLAN 4
Hx of Bladder Ca with mets  last chemotherapy in may of 2022.  hem onc following  palliative appreciated   continue current pain mgmt. serum sodium on admission 132  resolved  s/p IVF  encourage PO intake  monitor bmp

## 2022-12-19 NOTE — PROGRESS NOTE ADULT - ASSESSMENT
JEF HOUSE is a 56y Female who presents with a chief complaint of dyspnea.    Metastatic Bladder Cancer  - Patient previously followed with [facility] and at [facility]. Now in process of switching to [facility].  - Last chemotherapy was in May 2022. She never had immunotherapy  - Last imaging in October 2022 had shown worsening pulmonary and liver metastases.   - No systemic therapy while inpatient.  - Consult palliative care for goals of care evaluation. Patient missed her appointment with her oncologist on December 16th as she was still inpatient; will need to set up again. Noted ongoing process with appeals.   - Pain medicine evaluation noted. Optimize pain control.     Anemia  - Patient presented with severe anemia; required blood transfusions.  - In the setting of active malignancy. Noted decreased transferrin saturation indicating iron deficiency as well.  - Can administer oral iron.  - Monitor CBC and transfuse to maintain hemoglobin > 7.    Hypercalcemia  - Continue to monitor. Patient had received one dose of pamidronate    Dyspnea  - Patient refused CTA. Ultrasound did not show any DVT.  - Not on supplemental oxygen.    Will continue to follow.    Reji Root MD  Hematology/Oncology  O: [phone] JEF HOUSE is a 56y Female who presents with a chief complaint of dyspnea.    Metastatic Bladder Cancer  - Patient previously followed with [facility] and at [facility]. Now in process of switching to [facility].  - Last chemotherapy was in May 2022. She never had immunotherapy. If patient can make it to outpatient appointments and be assessed there, she may be a candidate. Note however that patient has refused discharge with ongoing complaints of pain and dyspnea.   - Last imaging in October 2022 had shown worsening pulmonary and liver metastases.   - No systemic therapy while inpatient.  - Ongoing goals of care evaluation. Patient missed her appointment with her oncologist on December 16th as she was still inpatient; will need to set up again.   - Pain medicine evaluation noted. Optimize pain control.     Anemia  - Patient presented with severe anemia; required blood transfusions.  - In the setting of active malignancy. Noted decreased transferrin saturation indicating iron deficiency as well.  - Can administer oral iron.  - Monitor CBC and transfuse to maintain hemoglobin > 7.  - Patient continues to complain of vaginal bleeding.    Hypercalcemia  - Continue to monitor. Patient had received one dose of pamidronate    Dyspnea  - Patient refused CTA. Ultrasound did not show any DVT.    Will continue to follow.    Reji Root MD  Hematology/Oncology  O: [phone]

## 2022-12-19 NOTE — PROGRESS NOTE ADULT - PROBLEM SELECTOR PLAN 3
serum sodium on admission 132  resolved  s/p IVF  encourage PO intake  monitor bmp hgb 6.9-->9.0  MCV 23-->29.7  transferrin 156, ferritin 806 ( likely falsely elevated 2/2 malignancy), serum iron 16  Post 2 Units PRBC   hem/onc is following  no overt bleeding  hbg 8.6 12/19 - repeat CBC as pt complaint vaginal bleeding started again which is chr symptoms for her cancer   iron supplement   cbc PRN   Monitor CBC and transfuse to maintain hemoglobin > 7. hgb 6.9-->9.0  MCV 23-->29.7  transferrin 156, ferritin 806 ( likely falsely elevated 2/2 malignancy), serum iron 16  Post 2 Units PRBC   hem/onc is following  no overt bleeding  hbg 8.6 12/19 - repeat CBC as pt complaint vaginal bleeding started again which is chr symptoms for her cancer   -> repeat Hg is 8.6 stabe no transfusion   iron supplement   cbc PRN   Monitor CBC and transfuse to maintain hemoglobin > 7.

## 2022-12-19 NOTE — PROGRESS NOTE ADULT - ASSESSMENT
( Note written / Date of service 12-19-22 )    ==================>> MEDICATIONS <<====================    acetaminophen     Tablet .. 325 milliGRAM(s) Oral every 4 hours  enoxaparin Injectable 40 milliGRAM(s) SubCutaneous every 24 hours  ferrous    sulfate 325 milliGRAM(s) Oral daily  folic acid 1 milliGRAM(s) Oral daily  lactulose Syrup 15 Gram(s) Oral every other day  lidocaine   4% Patch 1 Patch Transdermal every 24 hours  pantoprazole    Tablet 40 milliGRAM(s) Oral every 12 hours  polyethylene glycol 3350 17 Gram(s) Oral daily  senna 2 Tablet(s) Oral at bedtime    MEDICATIONS  (PRN):  aluminum hydroxide/magnesium hydroxide/simethicone Suspension 30 milliLiter(s) Oral every 4 hours PRN Dyspepsia  bisacodyl Suppository 10 milliGRAM(s) Rectal daily PRN Constipation  diphenhydrAMINE 25 milliGRAM(s) Oral every 4 hours PRN Itching  morphine   Solution 5 milliGRAM(s) Oral every 3 hours PRN Severe Pain (7 - 10)  simethicone 80 milliGRAM(s) Chew every 6 hours PRN Gas    ___________  Active diet:  Diet, Regular:   Supplement Feeding Modality:  Oral  Ensure Enlive Cans or Servings Per Day:  1       Frequency:  Three Times a day  ___________________    ==================>> VITAL SIGNS <<==================    Vital Signs Last 24 HrsT(C): 36.4 (12-19-22 @ 15:05)  T(F): 97.6 (12-19-22 @ 15:05), Max: 98.4 (12-18-22 @ 21:32)  HR: 66 (12-19-22 @ 17:20) (66 - 77)  BP: 100/64 (12-19-22 @ 17:20)  RR: 18 (12-19-22 @ 15:05) (18 - 19)  SpO2: 100% (12-19-22 @ 15:05) (99% - 100%)      CAPILLARY BLOOD GLUCOSE         ==================>> LAB AND IMAGING <<==================                        8.6    13.31 )-----------( 357      ( 19 Dec 2022 12:21 )             29.0              WBC count:   13.31 <<== ,  11.84 <<== ,  13.34 <<==   Hemoglobin:   8.6 <<==,  8.4 <<==,  9.0 <<==  platelets:  357 <==, 347 <==, 380 <==    Creatinine:  0.69  <<==  Sodium:   134  <==       AST:          24 <== , 22 <==      ALT:        21  <== , 17  <==      AP:        263  <=, 230  <=     Bili:        0.3  <=, 0.3  <=    ____________________________    M I C R O B I O L O G Y :      COVID-19 PCR: NotDetec (12-14-22 @ 15:00)  COVID-19 PCR: NotDetec (12-07-22 @ 05:43)  COVID-19 PCR: NotDetec (12-01-22 @ 16:25)     _________________________________________________________________________________________  ========>>  M E D I C A L   A T T E N D I N G    F O L L O W  U P  N O T E  <<=========  -----------------------------------------------------------------------------------------------------    - Patient seen and examined by me earlier today.   - In summary,  JEF HOUSE is a 56y year old woman : was discharged last week, going through the appeals process ( second level.. )   - Patient today overall doing fairly comfortable, eating OK.     per patient not getting morphine because BP has been low .. discussed with RN already and provider to RN ordered        pain mgmt and palliative also following...  discussed in great detail with patient at bedside with palliative attending re fentanyl patch but patient declining to use it , wants to discuss with cardiologist     ==================>> REVIEW OF SYSTEM <<=================    GEN: no fever, no chills, pain as above   RESP: states is SOB ( but always saturating in the 90s) , no cough, no sputum  CVS: no chest pain, no palpitations   GI: no abdominal pain, no nausea  : no dysuria, + chronic hematuria, chronic on and off vaginal bleed   Neuro: no headache, no dizziness  Derm : no itching, no rash    ==================>> PHYSICAL EXAM <<=================    GEN: A&O X 3 , NAD , comfortable, pleasant, calm in bed, cachectic   HEENT: NCAT, PERRL, MMM, hearing intact  Neck: supple , no JVD appreciated  CVS: S1S2 , regular , No M/R/G appreciated  PULM: CTA B/L,  no W/R/R appreciated  ABD.: soft. non tender, non distended,  bowel sounds present  Extrem: intact pulses , no edema      nephrostomy tube in place with clear yellow urine   PSYCH : normal mood,  not anxious                    ( Note written / Date of service 12-19-22 )    ==================>> MEDICATIONS <<====================    acetaminophen     Tablet .. 325 milliGRAM(s) Oral every 4 hours  enoxaparin Injectable 40 milliGRAM(s) SubCutaneous every 24 hours  ferrous    sulfate 325 milliGRAM(s) Oral daily  folic acid 1 milliGRAM(s) Oral daily  lactulose Syrup 15 Gram(s) Oral every other day  lidocaine   4% Patch 1 Patch Transdermal every 24 hours  pantoprazole    Tablet 40 milliGRAM(s) Oral every 12 hours  polyethylene glycol 3350 17 Gram(s) Oral daily  senna 2 Tablet(s) Oral at bedtime    MEDICATIONS  (PRN):  aluminum hydroxide/magnesium hydroxide/simethicone Suspension 30 milliLiter(s) Oral every 4 hours PRN Dyspepsia  bisacodyl Suppository 10 milliGRAM(s) Rectal daily PRN Constipation  diphenhydrAMINE 25 milliGRAM(s) Oral every 4 hours PRN Itching  morphine   Solution 5 milliGRAM(s) Oral every 3 hours PRN Severe Pain (7 - 10)  simethicone 80 milliGRAM(s) Chew every 6 hours PRN Gas    ___________  Active diet:  Diet, Regular:   Supplement Feeding Modality:  Oral  Ensure Enlive Cans or Servings Per Day:  1       Frequency:  Three Times a day  ___________________    ==================>> VITAL SIGNS <<==================    Vital Signs Last 24 HrsT(C): 36.4 (12-19-22 @ 15:05)  T(F): 97.6 (12-19-22 @ 15:05), Max: 98.4 (12-18-22 @ 21:32)  HR: 66 (12-19-22 @ 17:20) (66 - 77)  BP: 100/64 (12-19-22 @ 17:20)  RR: 18 (12-19-22 @ 15:05) (18 - 19)  SpO2: 100% (12-19-22 @ 15:05) (99% - 100%)       ==================>> LAB AND IMAGING <<==================                        8.6    13.31 )-----------( 357      ( 19 Dec 2022 12:21 )             29.0        WBC count:   13.31 <<== ,  11.84 <<== ,  13.34 <<==   Hemoglobin:   8.6 <<==,  8.4 <<==,  9.0 <<==  platelets:  357 <==, 347 <==, 380 <==       AST:          24 <== , 22 <==      ALT:        21  <== , 17  <==      AP:        263  <=, 230  <=     Bili:        0.3  <=, 0.3  <=    ___________________________________________________________________________________  ===============>>  A S S E S S M E N T   A N D   P L A N <<===============  ------------------------------------------------------------------------------------------    56 year old female with PMHx of Asthma, Anemia, Bladder cancer with metastases to the lungs, Bradycardia, Hypertension, Dyslipidemia, Hydronephrosis, R-nephrostomy tube, Liver cyst, Parathyroid tumor, Chronic pain and 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     patient remains medically stable for DC  patient does not qualify for O2 supplement : son in in process of buying it on his own as per patient   continue pain mgmt  Continue Current medications otherwise and monitor.  pt not interested in palliation / hospice and has appointment with oncology next week ( already missed appointment last week)     --------------------------------------------  Case discussed with patient, RN, NP, raul, ..   Education given on findings and plan of care  ___________________________  H. NIEVES Ochoa.  Pager: [phone]

## 2022-12-19 NOTE — PROGRESS NOTE ADULT - PROBLEM SELECTOR PLAN 1
Pt with chronic backpain and abdominal pain which is somatic and neuropathic in nature due to metastatic bladder CA to lungs and right nephrostomy tube (changed on 12/7). + opioid dependence. + occasional gas discomfort. + constipation + occasional vaginal bleeding  Opioid pain recommendations   - Continue Morphine sulf solution 5mg PO q3h PRN severe pain. Monitor for respiratory depression/sedation.    Non-opioid pain recommendations   - Avoid NSIADs- pt with solitary kidney  - Added Acetaminophen 325 mg PO q 4 hours x 4 days for moderate pain. Monitor LFT's  - Continue lidoderm 4% patch daily.   Bowel Regimen  - Continue Miralax 17G PO daily  - Continue lactulose 15G PO every other day per primary team  - Continue dulcolax 10mg supp PRN constipation   Mild pain   - Non-pharmacological pain treatment recommendations  - Warm/ Cool packs PRN   - Repositioning, imagery, relaxation, distraction.  - Physical therapy OOB if no contraindications   Recommendations discussed with primary team and RN.

## 2022-12-19 NOTE — PROGRESS NOTE ADULT - PROBLEM SELECTOR PLAN 7
SCD for dvt ppx  PPI.  Dispo planing home  patient not interested in hospice   supportive care   OP Onc follow up   patient medically stable for DC hx of vaginal bleeding, declined obgyn assessment at [facility]  Hx of bladder cancer likely causing bleeding   pt seen by GYN at [facility]: no intervention possible at this time..

## 2022-12-19 NOTE — PROGRESS NOTE ADULT - PROBLEM SELECTOR PLAN 1
Chest X-ray showing metastatic disease unchanged from prior : most likely cause of dyspnea ( although anemia contributing)   high risk for DVT/PE but with contraindication for AC due to active chronic vaginal bleeding due to cancer and patient has been saturating well and not tachycardic   home medication morphine for dyspnea and pain.  as above asking for home O2 and trying to set up on her own , stating she was approved for it before !! medically stable since last week  but pt appealed discharge and lost  but she wants to appeal again  patient advocate will assist her to submit the 2nd discharge appeal today  son - will purchase home O2 for SOB  d/c home in 24-48hours  ongoing GOC coversation - unable to have full conversation and she gets emotional when it comes to palliative or hospice care medically stable since last week  but pt appealed discharge and lost  but she wants to appeal again  patient advocate will assist her to submit the 2nd discharge appeal today  son - will purchase home O2 per previous provider - pt said she is willing to get oxygen through her insurance not out of pocket, explained again her RA at rest is 92 and ambulation also 92% so unable to start the home O2 process at this time   d/c home in 24-48hours  ongoing GOC coversation - unable to have full conversation and she gets emotional when it comes to palliative or hospice care

## 2022-12-19 NOTE — PROGRESS NOTE ADULT - SUBJECTIVE AND OBJECTIVE BOX
CHIEF COMPLAINT  Dyspnea    HISTORY OF PRESENT ILLNESS  JEF HOUSE is a 56y Female who presents with a chief complaint of dyspnea    No acute events. No complaints.    REVIEW OF SYSTEMS  A complete review of systems was performed; negative except per HPI    PHYSICAL EXAM  T(C): 36.4 (12-19-22 @ 05:25), Max: 37.1 (12-18-22 @ 14:45)  HR: 68 (12-19-22 @ 05:25) (68 - 78)  BP: 110/60 (12-19-22 @ 05:50) (91/57 - 128/77)  RR: 19 (12-19-22 @ 05:50) (18 - 19)  SpO2: 99% (12-19-22 @ 05:50) (99% - 100%)  Constitutional: alert, awake, in no acute distress  Eyes: PERRL, EOMI  HEENT: normocephalic, atraumatic  Neck: supple, non-tender  Cardiovascular: normal perfusion, no peripheral edema  Respiratory: normal respiratory efforts; no increased use of accessory muscles  Gastrointestinal: soft, non-tender  Musculoskeletal: normal range of motion, no deformities noted  Neurological: alert, CN II to XI grossly intact  Skin: warm, dry    LABORATORY DATA  None   CHIEF COMPLAINT  Dyspnea    HISTORY OF PRESENT ILLNESS  JEF HOUSE is a 56y Female who presents with a chief complaint of dyspnea    No acute events. She states that she is dyspneic, couldn't get to the bathroom, and is still in pain.    REVIEW OF SYSTEMS  A complete review of systems was performed; negative except per HPI    PHYSICAL EXAM  T(C): 36.4 (12-19-22 @ 05:25), Max: 37.1 (12-18-22 @ 14:45)  HR: 68 (12-19-22 @ 05:25) (68 - 78)  BP: 110/60 (12-19-22 @ 05:50) (91/57 - 128/77)  RR: 19 (12-19-22 @ 05:50) (18 - 19)  SpO2: 99% (12-19-22 @ 05:50) (99% - 100%)  Constitutional: alert, awake, in no acute distress  Eyes: PERRL, EOMI  HEENT: normocephalic, atraumatic  Neck: supple, non-tender  Cardiovascular: normal perfusion, no peripheral edema  Respiratory: normal respiratory efforts; no increased use of accessory muscles  Gastrointestinal: soft, non-tender  Musculoskeletal: normal range of motion, no deformities noted  Neurological: alert, CN II to XI grossly intact  Skin: warm, dry    LABORATORY DATA  None

## 2022-12-19 NOTE — CHART NOTE - NSCHARTNOTEFT_GEN_A_CORE
6x 5mg morphine doses in 24hrs = 15mcg fentanyl patch    starting at 12.5mcg fentanyl patch would be appropriate

## 2022-12-19 NOTE — PROGRESS NOTE ADULT - SUBJECTIVE AND OBJECTIVE BOX
NP Note discussed with  Primary Attending    Patient is a 56y old  Female who presents with a chief complaint of dyspnea (19 Dec 2022 10:51)      INTERVAL HPI/OVERNIGHT EVENTS: no new complaints    MEDICATIONS  (STANDING):  acetaminophen     Tablet .. 325 milliGRAM(s) Oral every 4 hours  enoxaparin Injectable 40 milliGRAM(s) SubCutaneous every 24 hours  ferrous    sulfate 325 milliGRAM(s) Oral daily  folic acid 1 milliGRAM(s) Oral daily  lactulose Syrup 15 Gram(s) Oral every other day  lidocaine   4% Patch 1 Patch Transdermal every 24 hours  pantoprazole    Tablet 40 milliGRAM(s) Oral every 12 hours  polyethylene glycol 3350 17 Gram(s) Oral daily  senna 2 Tablet(s) Oral at bedtime    MEDICATIONS  (PRN):  aluminum hydroxide/magnesium hydroxide/simethicone Suspension 30 milliLiter(s) Oral every 4 hours PRN Dyspepsia  bisacodyl Suppository 10 milliGRAM(s) Rectal daily PRN Constipation  diphenhydrAMINE 25 milliGRAM(s) Oral every 4 hours PRN Itching  morphine   Solution 5 milliGRAM(s) Oral every 3 hours PRN Severe Pain (7 - 10)  simethicone 80 milliGRAM(s) Chew every 6 hours PRN Gas      __________________________________________________  REVIEW OF SYSTEMS:    CONSTITUTIONAL: No fever,   EYES: no acute visual disturbances  NECK: No pain or stiffness  RESPIRATORY: No cough; No shortness of breath  CARDIOVASCULAR: No chest pain, no palpitations  GASTROINTESTINAL: No pain. No nausea or vomiting; No diarrhea   NEUROLOGICAL: No headache or numbness, no tremors  MUSCULOSKELETAL: No joint pain, no muscle pain  GENITOURINARY: no dysuria, no frequency, no hesitancy  PSYCHIATRY: no depression , no anxiety  ALL OTHER  ROS negative        Vital Signs Last 24 Hrs  T(C): 36.4 (19 Dec 2022 05:25), Max: 37.1 (18 Dec 2022 14:45)  T(F): 97.5 (19 Dec 2022 05:25), Max: 98.7 (18 Dec 2022 14:45)  HR: 68 (19 Dec 2022 05:25) (68 - 78)  BP: 110/60 (19 Dec 2022 05:50) (91/57 - 128/77)  BP(mean): --  RR: 19 (19 Dec 2022 05:50) (18 - 19)  SpO2: 99% (19 Dec 2022 05:50) (99% - 100%)    Parameters below as of 19 Dec 2022 05:50  Patient On (Oxygen Delivery Method): nasal cannula  O2 Flow (L/min): 2      ________________________________________________  PHYSICAL EXAM:  GENERAL: NAD  HEENT: Normocephalic;  conjunctivae and sclerae clear; moist mucous membranes;   NECK : supple  CHEST/LUNG: Clear to auscultation bilaterally with good air entry   HEART: S1 S2  regular; no murmurs, gallops or rubs  ABDOMEN: Soft, Nontender, Nondistended; Bowel sounds present  EXTREMITIES: no cyanosis; no edema; no calf tenderness  SKIN: warm and dry; no rash  NERVOUS SYSTEM:  Awake and alert; Oriented  to place, person and time ; no new deficits    _________________________________________________  LABS:              CAPILLARY BLOOD GLUCOSE            RADIOLOGY & ADDITIONAL TESTS:    Imaging Personally Reviewed:  YES/NO    Consultant(s) Notes Reviewed:   YES/ No    Care Discussed with Consultants :     Plan of care was discussed with patient and /or primary care giver; all questions and concerns were addressed and care was aligned with patient's wishes.     NP Note discussed with  Primary Attending    Patient is a 56y old  Female who presents with a chief complaint of dyspnea (19 Dec 2022 10:51)      INTERVAL HPI/OVERNIGHT EVENTS: no new complaints    MEDICATIONS  (STANDING):  acetaminophen     Tablet .. 325 milliGRAM(s) Oral every 4 hours  enoxaparin Injectable 40 milliGRAM(s) SubCutaneous every 24 hours  ferrous    sulfate 325 milliGRAM(s) Oral daily  folic acid 1 milliGRAM(s) Oral daily  lactulose Syrup 15 Gram(s) Oral every other day  lidocaine   4% Patch 1 Patch Transdermal every 24 hours  pantoprazole    Tablet 40 milliGRAM(s) Oral every 12 hours  polyethylene glycol 3350 17 Gram(s) Oral daily  senna 2 Tablet(s) Oral at bedtime    MEDICATIONS  (PRN):  aluminum hydroxide/magnesium hydroxide/simethicone Suspension 30 milliLiter(s) Oral every 4 hours PRN Dyspepsia  bisacodyl Suppository 10 milliGRAM(s) Rectal daily PRN Constipation  diphenhydrAMINE 25 milliGRAM(s) Oral every 4 hours PRN Itching  morphine   Solution 5 milliGRAM(s) Oral every 3 hours PRN Severe Pain (7 - 10)  simethicone 80 milliGRAM(s) Chew every 6 hours PRN Gas      __________________________________________________  REVIEW OF SYSTEMS:    unable to obtain full ROS- " I am in pain, please do something for me, I cant walk because of shortness of breath" And I had a bleeding from vagina again"   crying  on interview   Vital Signs Last 24 Hrs  T(C): 36.4 (19 Dec 2022 05:25), Max: 37.1 (18 Dec 2022 14:45)  T(F): 97.5 (19 Dec 2022 05:25), Max: 98.7 (18 Dec 2022 14:45)  HR: 68 (19 Dec 2022 05:25) (68 - 78)  BP: 110/60 (19 Dec 2022 05:50) (91/57 - 128/77)  BP(mean): --  RR: 19 (19 Dec 2022 05:50) (18 - 19)  SpO2: 99% (19 Dec 2022 05:50) (99% - 100%)    Parameters below as of 19 Dec 2022 05:50  Patient On (Oxygen Delivery Method): nasal cannula  O2 Flow (L/min): 2      ________________________________________________  PHYSICAL EXAM:  unable to perform full physical exam due to her pain and emotional crisis     Cachetic, able to complete the sentences without pause. no cough on exam    _________________________________________________  LABS:              CAPILLARY BLOOD GLUCOSE            RADIOLOGY & ADDITIONAL TESTS:  < from: Xray Foot AP + Lateral, Left (12.12.22 @ 14:03) >    ACC: [id number] EXAM:  XR FOOT 2 VIEWS LT                          PROCEDURE DATE:  12/12/2022          INTERPRETATION:  Left foot    HISTORY: Pain and swelling     Three views of the left foot show no evidence of fracture nor   destructive change. The joint spaces are maintained.    IMPRESSION: No acute bony pathology.        Thank you for this referral.    --- End of Report ---            RAMOS KINCAID MD; Attending Interventional Radiologist  This document has been electronically signed. Dec 13 2022  4:09PM    < end of copied text >    Imaging Personally Reviewed:  YES    Consultant(s) Notes Reviewed:   YES    Care Discussed with Consultants : pain hem/onc/ ID     Plan of care was discussed with patient and /or primary care giver; all questions and concerns were addressed and care was aligned with patient's wishes.

## 2022-12-19 NOTE — PROGRESS NOTE ADULT - PROBLEM SELECTOR PLAN 2
hgb 6.9-->9.0  MCV 23-->29.7  transferrin 156, ferritin 806 ( likely falsely elevated 2/2 malignancy), serum iron 16  Post 2 Units PRBC   hem/onc is following  no overt bleeding  hbg 9.0  iron supplement   cbc qd  Monitor CBC and transfuse to maintain hemoglobin > 7. Chest X-ray showing metastatic disease unchanged from prior : most likely cause of dyspnea ( although anemia contributing)   high risk for DVT/PE but with contraindication for AC due to active chronic vaginal bleeding due to cancer and patient has been saturating well and not tachycardic - eval for home oxygen few times and not a candidate for home O2 approval at this time but she has SOB and tachypnea   home medication morphine for dyspnea and pain.  as above asking for home O2 and trying to set up on her own , stating she was approved for it before

## 2022-12-19 NOTE — PROGRESS NOTE ADULT - ASSESSMENT
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 Patient also endorses vaginal bleeding for which she was evaluated by OBGYN at [facility] and was recommended further work up including TVUS but patient refused. Pt has a chronic nephrostomy tube that was placed at [facility] for right hydronephrosis. She follows with Dr Kan at [facility]. Pt last received chemotherapy in may of 2022. Patient admitted to medicine for dyspnea, Chest x-ray showing metastatic disease unchanged from prior. Patient at high risk for DVT/PE but with contraindication for AC due to active chronic vaginal bleeding due to cancer and patient has been saturating well and not tachycardic, home medication morphine for dyspnea and pain.   ID, Hem/onc and pain consulted. Patient admission complicated by acute anemia. Pt transfused 2U PRBC with a good response. Palliative consulted for conversation about hospice due to advanced metastatic cancer, patient refused services at this time and wants to f/o with her new oncologist after discharge. Patient c/o left foot swelling and pain, unsure if something dropped on her foot?  X-ray of left foot performed pending official reading. CM onboard for discharge planning. Patient medically cleared for discharge.    12/15- patient seen and examined at bedside, c/o chronic pain, pain management is following, Patient refusing to leave, discharge notice CM and SW is following.  Patient medically cleared for discharge.    12/16-  patient seen and examined at bedside, c/o chronic pain, pain management is following, Patient refusing to leave, discharge notice CM and SW is following.  Patient medically cleared for discharge.  Patient lost appeal and refusing to be discharge, CM onboard for 2nd appeal.       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 Patient also endorses vaginal bleeding for which she was evaluated by OBGYN at [facility] and was recommended further work up including TVUS but patient refused. Pt has a chronic nephrostomy tube that was placed at [facility] for right hydronephrosis. She follows with Dr Kan at [facility]. Pt last received chemotherapy in may of 2022. Patient admitted to medicine for dyspnea, Chest x-ray showing metastatic disease unchanged from prior. Patient at high risk for DVT/PE but with contraindication for AC due to active chronic vaginal bleeding due to cancer and patient has been saturating well and not tachycardic, home medication morphine for dyspnea and pain.   ID, Hem/onc and pain consulted. Patient admission complicated by acute anemia. Pt transfused 2U PRBC with a good response. Palliative consulted for conversation about hospice due to advanced metastatic cancer, patient refused services at this time and wants to f/o with her new oncologist after discharge. Patient c/o left foot swelling and pain, unsure if something dropped on her foot?  X-ray of left foot performed pending official reading. CM onboard for discharge planning. Patient medically cleared for discharge.    12/15- patient seen and examined at bedside, c/o chronic pain, pain management is following, Patient refusing to leave, discharge notice CM and SW is following.  Patient medically cleared for discharge.    12/16-  patient seen and examined at bedside, c/o chronic pain, pain management is following, Patient refusing to leave, discharge notice CM and SW is following.  Patient medically cleared for discharge.  Patient lost appeal and refusing to be discharge, CM onboard for 2nd appeal.      Seen and examined patient at bedside. Still c/o uncontrolled pain due to missing dose of morphine 2/2 hypotension. Tried to have more conversation regarding her diease process and possible plans which was unsuccessful at this time. Pt also report vaginal bleeding which is her chronic problem, will get CBC and if needed will transfuse her. CM following the case, pt did not make 2nd discharge appeal last week, patient advocate will help her to submit discharge appeal today .

## 2022-12-19 NOTE — PROGRESS NOTE ADULT - PROBLEM SELECTOR PLAN 6
hx of vaginal bleeding, declined obgyn assessment at [facility]  Hx of bladder cancer likely causing bleeding   pt seen by GYN at [facility]: no intervention possible at this time.. appreciated Hem onc eval  s/p pamidronate inj x 1 dose  Improving  normal PTH   monitor labs PRN

## 2022-12-19 NOTE — PROGRESS NOTE ADULT - PROBLEM SELECTOR PLAN 5
appreciated Hem onc eval  s/p pamidronate inj x 1 dose  Corrected calcium 12.4  f/u PTHrP. ( specimen received) Hx of Bladder Ca with mets to liver and lungs but no to bone yet per CT chest on 12/1/2022   last chemotherapy in may of 2022.  hem onc following - she has outpt onc appt on 12/23/ 22   palliative appreciated   continue current pain mgmt.

## 2022-12-19 NOTE — PROGRESS NOTE ADULT - ASSESSMENT
· Assessment	  Confidential Drug Utilization Report  Search Terms: Zoë Bell, 1965Search Date: 12/05/2022 09:13:40 AM  The Drug Utilization Report below displays all of the controlled substance prescriptions, if any, that your patient has filled in the last twelve months. The information displayed on this report is compiled from pharmacy submissions to the Department, and accurately reflects the information as submitted by the pharmacies.    This report was requested by: Pat Mora | Reference #: [id number]    You have not added a ABI number. Keeping your ABI number(s) up to date on the My ABI # page will enable the separation of your prescriptions from others in the search results.    Others' Prescriptions  Patient Name: Zoë BellBirth Date: 1965  Address: [address] [place], [place] [address]Jnt: Female  Rx Written	Rx Dispensed	Drug	Quantity	Days Supply	Prescriber Name	Prescriber Abi #	Payment Method	Dispenser  11/26/2022	11/26/2022	morphine sulf 10 mg/5 ml soln	430ml	20	Duke Meng M	[id number]	[id number]  11/15/2022	11/21/2022	morphine sulf 10 mg/5 ml soln	75ml	5	Ash Garcia	[id number]	Medicare	[id number]    Patient Name: Zoë BellBirth Date: 1965  Address: [address] [place], [place] [address]Hmw: Female  Rx Written	Rx Dispensed	Drug	Quantity	Days Supply	Prescriber Name	Prescriber Abi #	Payment Method	Dispenser  11/15/2022	11/15/2022	morphine sulf 10 mg/5 ml soln	75ml	5	Ash Garcia	[id number]	[facility]    Patient Name: Zoë BellBirth Date: 1965  Address: [address] [place], [place] [address]Nxt: Female  Rx Written	Rx Dispensed	Drug	Quantity	Days Supply	Prescriber Name	Prescriber Abi #	Payment Method	Dispenser  10/04/2022	10/15/2022	tramadol hcl 50 mg tablet	10	5	Jai Hughes [id number]	Medicare	[facility] [id number]  08/26/2022	09/01/2022	tramadol hcl 50 mg tablet	20	5	Timi [id number]	Medicare	[facility] [id number]  08/26/2022	09/01/2022	diazepam 5 mg tablet	15	5	Nawra, Timi	[id number]	Medicare	[facility] [id number]  * - Drugs marked with an asterisk are compound drugs. If the compound drug is made up of more than one controlled substance, then each controlled substance will be a separate row in the table.

## 2022-12-20 NOTE — PROGRESS NOTE ADULT - PROBLEM SELECTOR PLAN 2
Chest X-ray showing metastatic disease unchanged from prior : most likely cause of dyspnea ( although anemia contributing)   high risk for DVT/PE but with contraindication for AC due to active chronic vaginal bleeding due to cancer and patient has been saturating well and not tachycardic - eval for home oxygen few times and not a candidate for home O2 approval at this time but she has SOB and tachypnea   home medication morphine for dyspnea and pain.  as above asking for home O2 and trying to set up on her own , stating she was approved for it before Chest X-ray showing metastatic disease unchanged from prior : most likely cause of dyspnea ( although anemia contributing)   high risk for DVT/PE but with contraindication for AC due to active chronic vaginal bleeding due to cancer and   eval for home oxygen few times and not a candidate for home O2  home medication morphine for dyspnea and pain.

## 2022-12-20 NOTE — PROGRESS NOTE ADULT - PROBLEM SELECTOR PLAN 5
Hx of Bladder Ca with mets to liver and lungs but no to bone yet per CT chest on 12/1/2022   last chemotherapy in may of 2022.  hem onc following - she has outpt onc appt on 12/23/ 22   palliative appreciated   continue current pain mgmt. Hx of Bladder Ca with mets to liver and lungs per CT chest on 12/1/2022   last chemotherapy in may of 2022.  hem onc following - she has outpt onc appt on 12/23/ 22   palliative appreciated   continue current pain mgmt.

## 2022-12-20 NOTE — PROGRESS NOTE ADULT - ASSESSMENT
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 Patient also endorses vaginal bleeding for which she was evaluated by OBGYN at [facility] and was recommended further work up including TVUS but patient refused. Pt has a chronic nephrostomy tube that was placed at [facility] for right hydronephrosis. She follows with Dr Kan at [facility]. Pt last received chemotherapy in may of 2022. Patient admitted to medicine for dyspnea, Chest x-ray showing metastatic disease unchanged from prior. Patient at high risk for DVT/PE but with contraindication for AC due to active chronic vaginal bleeding due to cancer and patient has been saturating well and not tachycardic, home medication morphine for dyspnea and pain.   ID, Hem/onc and pain consulted. Patient admission complicated by acute anemia. Pt transfused 2U PRBC with a good response. Palliative consulted for conversation about hospice due to advanced metastatic cancer, patient refused services at this time and wants to f/o with her new oncologist after discharge. Patient c/o left foot swelling and pain, unsure if something dropped on her foot?  X-ray of left foot performed pending official reading. CM onboard for discharge planning. Patient medically cleared for discharge.    12/15- patient seen and examined at bedside, c/o chronic pain, pain management is following, Patient refusing to leave, discharge notice CM and SW is following.  Patient medically cleared for discharge.    12/16-  patient seen and examined at bedside, c/o chronic pain, pain management is following, Patient refusing to leave, discharge notice CM and SW is following.  Patient medically cleared for discharge.  Patient lost appeal and refusing to be discharge, CM onboard for 2nd appeal.      Seen and examined patient at bedside. Still c/o uncontrolled pain due to missing dose of morphine 2/2 hypotension. Tried to have more conversation regarding her diease process and possible plans which was unsuccessful at this time. Pt also report vaginal bleeding which is her chronic problem, will get CBC and if needed will transfuse her. CM following the case, pt did not make 2nd discharge appeal last week, patient advocate will help her to submit discharge appeal today .      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 Patient also endorses vaginal bleeding for which she was evaluated by OBGYN at [facility] and was recommended further work up including TVUS but patient refused. Pt [facility] She follows with Dr Kan at [facility]. Pt last received chemotherapy in may of 2022. Patient admitted to medicine for dyspnea, Chest x-ray showing metastatic disease unchanged from prior. Patient at high risk for DVT/PE but with contraindication for AC due to active chronic vaginal bleeding due to cancer and patient has been saturating well and not tachycardic, home medication morphine for dyspnea and pain.   ID, Hem/onc and pain consulted. Patient admission complicated by acute anemia. Pt transfused 2U PRBC with a good response. Palliative consulted for conversation about hospice due to advanced metastatic cancer, patient refused services at this time and wants to f/o with her new oncologist after discharge. Patient c/o left foot swelling and pain, unsure if something dropped on her foot?  X-ray of left foot performed pending official reading. CM onboard for discharge planning. Patient medically cleared for discharge.    12/15- patient seen and examined at bedside, c/o chronic pain, pain management is following, Patient refusing to leave, discharge notice CM and SW is following.  Patient medically cleared for discharge.    12/16-  patient seen and examined at bedside, c/o chronic pain, pain management is following, Patient refusing to leave, discharge notice CM and SW is following.  Patient medically cleared for discharge. Patient lost appeal and refusing to be discharge, CM onboard for 2nd appeal.

## 2022-12-20 NOTE — PROGRESS NOTE ADULT - ASSESSMENT
JEF HOUSE is a 56y Female who presents with a chief complaint of dyspnea.    Metastatic Bladder Cancer  - Patient previously followed with [facility] and at [facility]. Now in process of switching to [facility].  - Last chemotherapy was in May 2022. She never had immunotherapy. If patient can make it to outpatient appointments and be assessed there, she may be a candidate. Note however that patient has refused discharge with ongoing complaints of pain and dyspnea. Discussed with her that continued hospitalization will delay any potential treatment outpatient.   - Last imaging in October 2022 had shown worsening pulmonary and liver metastases.   - No systemic therapy while inpatient.  - Pain medicine evaluation noted. Discussed fentanyl patch recommendation with patient in details today. Discussed with her that birth control patch and fentanyl patch are different, and that fentanyl patch by itself would not increase risks for venous thromboembolism. She is willing to try.    Anemia  - Patient presented with severe anemia; required blood transfusions.  - In the setting of active malignancy. Noted decreased transferrin saturation indicating iron deficiency as well.  - Can administer oral iron.  - Monitor CBC and transfuse to maintain hemoglobin > 7.  - Patient continues to complain of vaginal bleeding.    Hypercalcemia  - Continue to monitor. Patient had received one dose of pamidronate    Dyspnea  - Patient refused CTA. Ultrasound did not show any DVT.    Will continue to follow.    Reji Root MD  Hematology/Oncology  O: [phone]

## 2022-12-20 NOTE — PROGRESS NOTE ADULT - SUBJECTIVE AND OBJECTIVE BOX
Patient is a 56y old  Female who presents with a chief complaint of dyspnea (20 Dec 2022 10:16)      INTERVAL HPI/OVERNIGHT EVENTS: vaginal bleeding    I&O's Summary    Vital Signs Last 24 Hrs  T(C): 36.6 (20 Dec 2022 04:52), Max: 36.6 (20 Dec 2022 04:52)  T(F): 97.8 (20 Dec 2022 04:52), Max: 97.8 (20 Dec 2022 04:52)  HR: 75 (20 Dec 2022 12:10) (66 - 82)  BP: 112/67 (20 Dec 2022 12:10) (93/51 - 114/69)  BP(mean): --  RR: 18 (20 Dec 2022 04:52) (18 - 18)  SpO2: 96% (20 Dec 2022 04:52) (96% - 100%)    Parameters below as of 20 Dec 2022 04:52  Patient On (Oxygen Delivery Method): room air      PAST MEDICAL & SURGICAL HISTORY:  Anemia      Asthma      HLD (hyperlipidemia)      Pacemaker  [employer]      Bladder cancer      HTN (hypertension)      Parathyroid tumor      Liver cyst      Hydronephrosis  has right Nephrostomy tube - dressing intact      Bradycardia      History of renal calculi      Birthmark      H/O myomectomy      Presence of cardiac pacemaker      H/O hydronephrosis  s/p Right Perc nephrostomy tube placement 02/2021, exchange 06/2021          SOCIAL HISTORY  Alcohol:  Tobacco:  Illicit substance use:      FAMILY HISTORY:      LABS:                        8.6    13.31 )-----------( 357      ( 19 Dec 2022 12:21 )             29.0               CAPILLARY BLOOD GLUCOSE                MEDICATIONS  (STANDING):  acetaminophen     Tablet .. 325 milliGRAM(s) Oral every 4 hours  ferrous    sulfate 325 milliGRAM(s) Oral daily  folic acid 1 milliGRAM(s) Oral daily  lactulose Syrup 15 Gram(s) Oral every other day  lidocaine   4% Patch 1 Patch Transdermal every 24 hours  pantoprazole    Tablet 40 milliGRAM(s) Oral every 12 hours  polyethylene glycol 3350 17 Gram(s) Oral daily  senna 2 Tablet(s) Oral at bedtime    MEDICATIONS  (PRN):  aluminum hydroxide/magnesium hydroxide/simethicone Suspension 30 milliLiter(s) Oral every 4 hours PRN Dyspepsia  bisacodyl Suppository 10 milliGRAM(s) Rectal daily PRN Constipation  diphenhydrAMINE 25 milliGRAM(s) Oral every 4 hours PRN Itching  morphine   Solution 5 milliGRAM(s) Oral every 3 hours PRN Severe Pain (7 - 10)  simethicone 80 milliGRAM(s) Chew every 6 hours PRN Gas      REVIEW OF SYSTEMS:  CONSTITUTIONAL: No fever, weight loss, or fatigue  EYES: No eye pain, visual disturbances, or discharge  ENMT:  No difficulty hearing, tinnitus, vertigo; No sinus or throat pain  NECK: No pain or stiffness  RESPIRATORY: No cough, wheezing, chills or hemoptysis; No shortness of breath  CARDIOVASCULAR: No chest pain, palpitations, dizziness, or leg swelling  GASTROINTESTINAL: No abdominal or epigastric pain. No nausea, vomiting, or hematemesis; No diarrhea or constipation. No melena or hematochezia.  GENITOURINARY: No dysuria, frequency, hematuria, or incontinence  NEUROLOGICAL: No headaches, memory loss, loss of strength, numbness, or tremors  SKIN: No itching, burning, rashes, or lesions   LYMPH NODES: No enlarged glands  ENDOCRINE: No heat or cold intolerance; No hair loss  MUSCULOSKELETAL: No joint pain or swelling; No muscle, back, or extremity pain  PSYCHIATRIC: No depression, anxiety, mood swings, or difficulty sleeping  HEME/LYMPH: No easy bruising, or bleeding gums  ALLERY AND IMMUNOLOGIC: No hives or eczema    RADIOLOGY & ADDITIONAL TESTS:    Imaging Personally Reviewed:  [ ] YES  [ ] NO    Consultant(s) Notes Reviewed:  [ ] YES  [ ] NO    PHYSICAL EXAM:  GENERAL: NAD, well-groomed, well-developed  HEAD:  Atraumatic, Normocephalic  EYES: EOMI, PERRLA, conjunctiva and sclera clear  ENMT: No tonsillar erythema, exudates, or enlargement; Moist mucous membranes, Good dentition, No lesions  NECK: Supple, No JVD, Normal thyroid  NERVOUS SYSTEM:  Alert & Oriented X3, Good concentration; Motor Strength 5/5 B/L upper and lower extremities; DTRs 2+ intact and symmetric  CHEST/LUNG: Clear to percussion bilaterally; No rales, rhonchi, wheezing, or rubs  HEART: Regular rate and rhythm; No murmurs, rubs, or gallops  ABDOMEN: Soft, Nontender, Nondistended; Bowel sounds present  EXTREMITIES:  2+ Peripheral Pulses, No clubbing, cyanosis, or edema  LYMPH: No lymphadenopathy noted  SKIN: No rashes or lesions    Care Collaborated Discussed with Consultants/Other Providers [ ] YES  [ ] NO Patient is a 56y old  Female who presents with a chief complaint of dyspnea (20 Dec 2022 10:16)      INTERVAL HPI/OVERNIGHT EVENTS: vaginal bleeding    I&O's Summary    Vital Signs Last 24 Hrs  T(C): 36.6 (20 Dec 2022 04:52), Max: 36.6 (20 Dec 2022 04:52)  T(F): 97.8 (20 Dec 2022 04:52), Max: 97.8 (20 Dec 2022 04:52)  HR: 75 (20 Dec 2022 12:10) (66 - 82)  BP: 112/67 (20 Dec 2022 12:10) (93/51 - 114/69)  BP(mean): --  RR: 18 (20 Dec 2022 04:52) (18 - 18)  SpO2: 96% (20 Dec 2022 04:52) (96% - 100%)    Parameters below as of 20 Dec 2022 04:52  Patient On (Oxygen Delivery Method): room air      PAST MEDICAL & SURGICAL HISTORY:  Anemia      Asthma      HLD (hyperlipidemia)      Pacemaker  [employer]      Bladder cancer      HTN (hypertension)      Parathyroid tumor      Liver cyst      Hydronephrosis  has right Nephrostomy tube - dressing intact      Bradycardia      History of renal calculi      Birthmark      H/O myomectomy      Presence of cardiac pacemaker      H/O hydronephrosis  s/p Right Perc nephrostomy tube placement 02/2021, exchange 06/2021          SOCIAL HISTORY  Alcohol:  Tobacco:  Illicit substance use:      FAMILY HISTORY:      LABS:                        8.6    13.31 )-----------( 357      ( 19 Dec 2022 12:21 )             29.0               CAPILLARY BLOOD GLUCOSE    MEDICATIONS  (STANDING):  acetaminophen     Tablet .. 325 milliGRAM(s) Oral every 4 hours  ferrous    sulfate 325 milliGRAM(s) Oral daily  folic acid 1 milliGRAM(s) Oral daily  lactulose Syrup 15 Gram(s) Oral every other day  lidocaine   4% Patch 1 Patch Transdermal every 24 hours  pantoprazole    Tablet 40 milliGRAM(s) Oral every 12 hours  polyethylene glycol 3350 17 Gram(s) Oral daily  senna 2 Tablet(s) Oral at bedtime    MEDICATIONS  (PRN):  aluminum hydroxide/magnesium hydroxide/simethicone Suspension 30 milliLiter(s) Oral every 4 hours PRN Dyspepsia  bisacodyl Suppository 10 milliGRAM(s) Rectal daily PRN Constipation  diphenhydrAMINE 25 milliGRAM(s) Oral every 4 hours PRN Itching  morphine   Solution 5 milliGRAM(s) Oral every 3 hours PRN Severe Pain (7 - 10)  simethicone 80 milliGRAM(s) Chew every 6 hours PRN Gas      REVIEW OF SYSTEMS:  CONSTITUTIONAL: No fever  EYES: No eye pain, visual disturbances  ENMT: No sinus or throat pain  NECK: No pain   RESPIRATORY:  + shortness of breath  CARDIOVASCULAR: No chest pain, palpitations  GASTROINTESTINAL: no abdominal pain. No nausea, vomiting.  GENITOURINARY: No dysuria.  NEUROLOGICAL: No headaches  SKIN: No rashes     RADIOLOGY & ADDITIONAL TESTS:  < from: Xray Chest 1 View- PORTABLE-Urgent (Xray Chest 1 View- PORTABLE-Urgent .) (12.01.22 @ 18:12) >  CC: [id number] EXAM:  XR CHEST PORTABLE URGENT 1V                          PROCEDURE DATE:  12/01/2022          INTERPRETATION:  Short of breath.    AP chest. Prior 10/25/2022.    Left ICD. Normal heart mediastinum. Hyperinflated lungs. Multiple   parenchymal nodules of varying sizes reidentified similar to prior   consistent with metastatic disease. No acute infiltrate pleural effusion   or pneumothorax.    IMPRESSION: No acute infiltrate. pulmonary metastatic nodules similar to   prior    --- End of Report ---    GERA PIKE MD; Attending Radiologist  This document has been electronically signed. Dec  2 2022  9:48AM    < end of copied text >    ACC: [id number] EXAM:  US DPLX LWR EXT VEINS COMPL BI                          PROCEDURE DATE:  12/04/2022          INTERPRETATION:  CLINICAL INFORMATION: Bilateral leg pain.    COMPARISON: None available.    TECHNIQUE: Duplex sonography of the BILATERAL LOWER extremity veins with   color and spectral Doppler, with and without compression.    FINDINGS:    RIGHT:  Normal compressibility of the RIGHT common femoral, femoral and popliteal   veins.  Doppler examination shows normal spontaneous and phasic flow.  No RIGHT calf vein thrombosis is detected.    LEFT:  Normal compressibility of the LEFT common femoral, femoral and popliteal   veins.  Doppler examination shows normal spontaneous and phasic flow.  No LEFT calf vein thrombosis is detected.    IMPRESSION:  No evidence of deep venous thrombosis in either lower extremity.    --- End of Report ---    LAUREN GUERRERO MD; Attending Radiologist  This document has been electronically signed. Dec  4 2022  1:28PM    ACC: [id number] EXAM:  XR FOOT 2 VIEWS LT                          PROCEDURE DATE:  12/12/2022          INTERPRETATION:  Left foot    HISTORY: Pain and swelling     Three views of the left foot show no evidence of fracture nor   destructive change. The joint spaces are maintained.    IMPRESSION: No acute bony pathology.        Thank you for this referral.    --- End of Report ---      RAMOS KINCAID MD; Attending Interventional Radiologist  This document has been electronically signed. Dec 13 2022  4:09PM    Imaging Personally Reviewed:  [ x] YES  [ ] NO    Consultant(s) Notes Reviewed:  [x ] YES  [ ] NO    PHYSICAL EXAM:  GENERAL: NAD  HEAD:  Atraumatic, Normocephalic  EYES:  conjunctiva and sclera clear  ENMT:  Moist mucous membranes  NECK: Supple  NERVOUS SYSTEM:  Alert & Oriented X3  CHEST/LUNG: CTA bilaterally  HEART: Regular rate and rhythm  ABDOMEN:  TTP + distended; Bowel sounds present  EXTREMITIES:  2+ Peripheral Pulses  SKIN: No rashes    Care Collaborated Discussed with Consultants/Other Providers [x ] YES  [ ] NO

## 2022-12-20 NOTE — PROGRESS NOTE ADULT - PROBLEM SELECTOR PLAN 1
medically stable since last week  but pt appealed discharge and lost  but she wants to appeal again  patient advocate will assist her to submit the 2nd discharge appeal today  son - will purchase home O2 per previous provider - pt said she is willing to get oxygen through her insurance not out of pocket, explained again her RA at rest is 92 and ambulation also 92% so unable to start the home O2 process at this time   d/c home in 24-48hours  ongoing GOC coversation - unable to have full conversation and she gets emotional when it comes to palliative or hospice care medically stable since last week  second appeal in progress  d/c home in 24-48hours

## 2022-12-20 NOTE — PROGRESS NOTE ADULT - ASSESSMENT
( Note written / Date of service 12-20-22 )    ==================>> MEDICATIONS <<====================    acetaminophen     Tablet .. 325 milliGRAM(s) Oral every 4 hours  ferrous    sulfate 325 milliGRAM(s) Oral daily  folic acid 1 milliGRAM(s) Oral daily  lactulose Syrup 15 Gram(s) Oral every other day  lidocaine   4% Patch 1 Patch Transdermal every 24 hours  pantoprazole    Tablet 40 milliGRAM(s) Oral every 12 hours  polyethylene glycol 3350 17 Gram(s) Oral daily  senna 2 Tablet(s) Oral at bedtime    MEDICATIONS  (PRN):  aluminum hydroxide/magnesium hydroxide/simethicone Suspension 30 milliLiter(s) Oral every 4 hours PRN Dyspepsia  bisacodyl Suppository 10 milliGRAM(s) Rectal daily PRN Constipation  diphenhydrAMINE 25 milliGRAM(s) Oral every 4 hours PRN Itching  morphine   Solution 5 milliGRAM(s) Oral every 3 hours PRN Severe Pain (7 - 10)  simethicone 80 milliGRAM(s) Chew every 6 hours PRN Gas    ___________  Active diet:  Diet, Regular:   Supplement Feeding Modality:  Oral  Ensure Enlive Cans or Servings Per Day:  1       Frequency:  Three Times a day  ___________________    ==================>> VITAL SIGNS <<==================    Vital Signs Last 24 HrsT(C): 36.6 (12-20-22 @ 13:12)  T(F): 97.9 (12-20-22 @ 13:12), Max: 97.9 (12-20-22 @ 13:12)  HR: 77 (12-20-22 @ 15:05) (66 - 82)  BP: 103/59 (12-20-22 @ 13:12)  RR: 18 (12-20-22 @ 13:12) (18 - 18)  SpO2: 97% (12-20-22 @ 15:05) (96% - 100%)      CAPILLARY BLOOD GLUCOSE         ==================>> LAB AND IMAGING <<==================                        8.6    13.31 )-----------( 357      ( 19 Dec 2022 12:21 )             29.0              WBC count:   13.31 <<== ,  11.84 <<==   Hemoglobin:   8.6 <<==,  8.4 <<==  platelets:  357 <==, 347 <==, 380 <==    Creatinine:  0.69  <<==  Sodium:   134  <==       AST:          24 <==      ALT:        21  <==      AP:        263  <=     Bili:        0.3  <=    ____________________________    M I C R O B I O L O G Y :      COVID-19 PCR: NotDetec (12-14-22 @ 15:00)  COVID-19 PCR: NotDetec (12-07-22 @ 05:43)  COVID-19 PCR: NotDetec (12-01-22 @ 16:25)     _________________________________________________________________________________________  ========>>  M E D I C A L   A T T E N D I N G    F O L L O W  U P  N O T E  <<=========  -----------------------------------------------------------------------------------------------------    - Patient seen and examined by me earlier today.   - In summary,  JEF HOUSE is a 56y year old woman : was discharged last week, going through the appeals process ( second level.. )   - Patient today overall doing fairly comfortable, eating OK.     ==================>> REVIEW OF SYSTEM <<=================    GEN: no fever, no chills, pain as above   RESP: states is SOB ( but always saturating in the 90s) , no cough, no sputum  CVS: no chest pain, no palpitations   GI: no abdominal pain, no nausea  : no dysuria, + chronic hematuria, chronic on and off vaginal bleed   Neuro: no headache, no dizziness  Derm : no itching, no rash    ==================>> PHYSICAL EXAM <<=================    GEN: A&O X 3 , NAD , comfortable, pleasant, calm, sittigng at edge of bed, legs down, cachectic   HEENT: NCAT, PERRL, MMM, hearing intact  Neck: supple , no JVD appreciated  CVS: S1S2 , regular , No M/R/G appreciated  PULM: CTA B/L,  no W/R/R appreciated  ABD.: soft. non tender, non distended,  bowel sounds present  Extrem: intact pulses , no edema      nephrostomy tube in place with clear yellow urine   PSYCH : normal mood,  not anxious                             ( Note written / Date of service 12-20-22 )    ==================>> MEDICATIONS <<====================    acetaminophen     Tablet .. 325 milliGRAM(s) Oral every 4 hours  ferrous    sulfate 325 milliGRAM(s) Oral daily  folic acid 1 milliGRAM(s) Oral daily  lactulose Syrup 15 Gram(s) Oral every other day  lidocaine   4% Patch 1 Patch Transdermal every 24 hours  pantoprazole    Tablet 40 milliGRAM(s) Oral every 12 hours  polyethylene glycol 3350 17 Gram(s) Oral daily  senna 2 Tablet(s) Oral at bedtime    MEDICATIONS  (PRN):  aluminum hydroxide/magnesium hydroxide/simethicone Suspension 30 milliLiter(s) Oral every 4 hours PRN Dyspepsia  bisacodyl Suppository 10 milliGRAM(s) Rectal daily PRN Constipation  diphenhydrAMINE 25 milliGRAM(s) Oral every 4 hours PRN Itching  morphine   Solution 5 milliGRAM(s) Oral every 3 hours PRN Severe Pain (7 - 10)  simethicone 80 milliGRAM(s) Chew every 6 hours PRN Gas    ___________  Active diet:  Diet, Regular:   Supplement Feeding Modality:  Oral  Ensure Enlive Cans or Servings Per Day:  1       Frequency:  Three Times a day  ___________________    ==================>> VITAL SIGNS <<==================    Vital Signs Last 24 HrsT(C): 36.6 (12-20-22 @ 13:12)  T(F): 97.9 (12-20-22 @ 13:12), Max: 97.9 (12-20-22 @ 13:12)  HR: 77 (12-20-22 @ 15:05) (66 - 82)  BP: 103/59 (12-20-22 @ 13:12)  RR: 18 (12-20-22 @ 13:12) (18 - 18)  SpO2: 97% (12-20-22 @ 15:05) (96% - 100%)       ==================>> LAB AND IMAGING <<==================                        8.6    13.31 )-----------( 357      ( 19 Dec 2022 12:21 )             29.0     ___________________________________________________________________________________  ===============>>  A S S E S S M E N T   A N D   P L A N <<===============  ------------------------------------------------------------------------------------------    56 year old female with PMHx of Asthma, Anemia, Bladder cancer with metastases to the lungs, Bradycardia, Hypertension, Dyslipidemia, Hydronephrosis, R-nephrostomy tube, Liver cyst, Parathyroid tumor, Chronic pain and 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     patient remains medically stable for DC  patient does not qualify for O2 supplement : son in in process of buying it on his own as per patient   continue pain mgmt  Continue Current medications otherwise and monitor.  pt not interested in palliation / hospice and has appointment with oncology next week ( already missed appointment last week)     --------------------------------------------  Case discussed with patient, RN, NP, raul, ..   Education given on findings and plan of care  ___________________________  H. NIEVES Ochoa.  Pager: [phone]

## 2022-12-20 NOTE — PROGRESS NOTE ADULT - SUBJECTIVE AND OBJECTIVE BOX
CHIEF COMPLAINT  Dyspnea    HISTORY OF PRESENT ILLNESS  JEF HOUSE is a 56y Female who presents with a chief complaint of dyspnea    No acute events. Continues to have intermittent pain.    REVIEW OF SYSTEMS  A complete review of systems was performed; negative except per HPI    PHYSICAL EXAM  T(C): 36.6 (12-20-22 @ 04:52), Max: 36.6 (12-20-22 @ 04:52)  HR: 82 (12-20-22 @ 06:17) (66 - 82)  BP: 114/69 (12-20-22 @ 06:17) (93/51 - 114/69)  RR: 18 (12-20-22 @ 04:52) (18 - 18)  SpO2: 96% (12-20-22 @ 04:52) (96% - 100%)  Constitutional: alert, awake, in no acute distress  Eyes: PERRL, EOMI  HEENT: normocephalic, atraumatic  Neck: supple, non-tender  Cardiovascular: normal perfusion, no peripheral edema  Respiratory: normal respiratory efforts; no increased use of accessory muscles  Gastrointestinal: soft, non-tender  Musculoskeletal: normal range of motion, no deformities noted  Neurological: alert, CN II to XI grossly intact  Skin: warm, dry    LABORATORY DATA                        8.6    13.31 )-----------( 357      ( 19 Dec 2022 12:21 )             29.0

## 2022-12-20 NOTE — PROGRESS NOTE ADULT - PROBLEM SELECTOR PROBLEM 9
Hydronephrosis
Discharge planning issues
Hydronephrosis
Hydronephrosis
Discharge planning issues
Prophylactic measure
Hydronephrosis
Discharge planning issues
Hydronephrosis
Hydronephrosis
Discharge planning issues

## 2022-12-20 NOTE — PROGRESS NOTE ADULT - SUBJECTIVE AND OBJECTIVE BOX
Source of information: [facility], Chart review  Patient language: English  : n/a    HPI:  56 year old female with PMHx of Asthma, Anemia, Bladder cancer with metastases to the lungs, Bradycardia, Hypertension, Dyslipidemia, Hydronephrosis, R-nephrostomy tube, Liver cyst, Parathyroid tumor, Chronic pain and 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 Patient states that she developed sudden onset dyspnea 2 days ago at rest. The dyspnoea is not alleviated or worsened by any factors. Pt also endorses vaginal bleeding for which she was evaluated by OBGYN at [facility] and was recommended further work up including TVUS but patient refused. Patient denies any chest pain. She was recently admitted at [facility] in October 2022 for hemoptysis and CT chest at that time showed Numerous bilateral pulmonary metastases; mild interval increase in size of a few of the largest pulmonary metastases compared to 9/20/202. Hospital course at that time was complicated by a drop in Hb for which she received blood transfusion. She also tested positive for COVID 19 but was asymptomatic. Pt has a chronic nephrostomy tube that was placed at [facility] for right hydronephrosis. She follows with Dr Kan at [facility]. Pt last recieved chemotherapy in may of 2022. She denies any fevers, chest pain, abd pain.    In ED VS: T 98, Hb 7.8, WBC 15, Na 132   (01 Dec 2022 19:07)    Dopplers- No evidence of deep venous thrombosis in either lower extremity.    Pt is admitted for SOB, anemia. Received 2 units PRBC on 12/1 and 12/2. Pt with metastatic bladder CA to lungs, on chronic opioids. + right nephrostomy (nephrostomy tube changed on 12/7). On morphine 5mg PO solution at home q4h PRN per istop. Reports she occasionally take morphine 6mg PO PRN if needed for pain at home. Pt seen and examined at bedside this morning.  Pt reports lumbar back and b/l flank pain score 7/10 and tolerable with current pain regimen SCALE USED: (1-10 VNRS).Pt describes pain as constant, throbbing, radiating throughout lumbar back, alleviated by pain medication, exacerbated by movement and palpation. Pt also reports abdominal pain 710, generalized, tightness, fullness and bloating a/w constipation. Reports last BM 12/19. small. Pt on bowel regimen. Reports she is anxious about starting a fentanyl patch, emotional support provided. Pt prefers to continue with current pain regimen. Tolerating PO diet, however has poor appetite and weight loss. Reports SOB on exertion, and requesting to be discharged on O2 at home however her O2 sat on room air does not qualify her to receive O2 at home. Reports lethargy, hx constipation and vaginal bleeding. Denies chest pain, nausea, vomiting. Patient stated goal for pain control: to be able to take deep breaths, get out of bed to chair and ambulate with tolerable pain control. PT ambulating to the bathroom with tolerable pain. Per pt, plan for oncology followup outpatient, has an appointment on 12/23.     PAST MEDICAL & SURGICAL HISTORY:  Anemia      Asthma      HLD (hyperlipidemia)      Pacemaker  [facility]      Bladder cancer      HTN (hypertension)      Parathyroid tumor      Liver cyst      Hydronephrosis  has right Nephrostomy tube - dressing intact      Bradycardia      History of renal calculi      Birthmark      H/O myomectomy      Presence of cardiac pacemaker      H/O hydronephrosis  s/p Right Perc nephrostomy tube placement 02/2021, exchange 06/2021          FAMILY HISTORY:  Family history of prostate cancer (Father)    Family history of CHF (congestive heart failure) (Father)    Family history of atrial fibrillation (Father)        Social History:  Denies any smoking, etoh , or drug use.o (01 Dec 2022 19:07)    Allergies    No Known Allergies    MEDICATIONS  (STANDING):  acetaminophen     Tablet .. 325 milliGRAM(s) Oral every 4 hours  ferrous    sulfate 325 milliGRAM(s) Oral daily  folic acid 1 milliGRAM(s) Oral daily  lactulose Syrup 15 Gram(s) Oral every other day  lidocaine   4% Patch 1 Patch Transdermal every 24 hours  pantoprazole    Tablet 40 milliGRAM(s) Oral every 12 hours  polyethylene glycol 3350 17 Gram(s) Oral daily  senna 2 Tablet(s) Oral at bedtime    MEDICATIONS  (PRN):  aluminum hydroxide/magnesium hydroxide/simethicone Suspension 30 milliLiter(s) Oral every 4 hours PRN Dyspepsia  bisacodyl Suppository 10 milliGRAM(s) Rectal daily PRN Constipation  diphenhydrAMINE 25 milliGRAM(s) Oral every 4 hours PRN Itching  morphine   Solution 5 milliGRAM(s) Oral every 3 hours PRN Severe Pain (7 - 10)  simethicone 80 milliGRAM(s) Chew every 6 hours PRN Gas      Vital Signs Last 24 Hrs  T(C): 36.6 (20 Dec 2022 13:12), Max: 36.6 (20 Dec 2022 04:52)  T(F): 97.9 (20 Dec 2022 13:12), Max: 97.9 (20 Dec 2022 13:12)  HR: 77 (20 Dec 2022 13:12) (66 - 82)  BP: 103/59 (20 Dec 2022 13:12) (93/51 - 114/69)  BP(mean): --  RR: 18 (20 Dec 2022 13:12) (18 - 18)  SpO2: 100% (20 Dec 2022 13:12) (96% - 100%)    Parameters below as of 20 Dec 2022 13:12  Patient On (Oxygen Delivery Method): nasal cannula  O2 Flow (L/min): 2    LABS: Reviewed                          8.6    13.31 )-----------( 357      ( 19 Dec 2022 12:21 )             29.0     COVID-19 PCR: NotDetec (14 Dec 2022 15:00)  COVID-19 PCR: NotDetec (07 Dec 2022 05:43)  COVID-19 PCR: NotDetec (01 Dec 2022 16:25)    Radiology: Reviewed.   < from: Xray Foot AP + Lateral, Left (12.12.22 @ 14:03) >    ACC: [id number] EXAM:  XR FOOT 2 VIEWS LT                          PROCEDURE DATE:  12/12/2022          INTERPRETATION:  Left foot    HISTORY: Pain and swelling     Three views of the left foot show no evidence of fracture nor   destructive change. The joint spaces are maintained.    IMPRESSION: No acute bony pathology.        Thank you for this referral.    --- End of Report ---            RAMOS KINCAID MD; Attending Interventional Radiologist  This document has been electronically signed. Dec 13 2022  4:09PM    < end of copied text >      < from: US Duplex Venous Lower Ext Complete, Bilateral (12.04.22 @ 13:23) >    ACC: [id number] EXAM:  US DPLX LWR EXT VEINS COMPL BI                          PROCEDURE DATE:  12/04/2022          INTERPRETATION:  CLINICAL INFORMATION: Bilateral leg pain.    COMPARISON: None available.    TECHNIQUE: Duplex sonography of the BILATERAL LOWER extremity veins with   color and spectral Doppler, with and without compression.    FINDINGS:    RIGHT:  Normal compressibility of the RIGHT common femoral, femoral and popliteal   veins.  Doppler examination shows normal spontaneous and phasic flow.  No RIGHT calf vein thrombosis is detected.    LEFT:  Normal compressibility of the LEFT common femoral, femoral and popliteal   veins.  Doppler examination shows normal spontaneous and phasic flow.  No LEFT calf vein thrombosis is detected.    IMPRESSION:  No evidence of deep venous thrombosis in either lower extremity.          --- End of Report ---            LAUREN GUERRERO MD; Attending Radiologist  This document has been electronically signed. Dec  4 2022  1:28PM    < end of copied text >      ORT Score -   Family Hx of substance abuse	Female	      Male  Alcohol 	                                           1                     3  Illegal drugs	                                   2                     3  Rx drugs                                           4 	                  4  Personal Hx of substance abuse		  Alcohol 	                                          3	                  3  Illegal drugs                                     4	                  4  Rx drugs                                            5 	                  5  Age between 16- 45 years	           1                     1  hx preadolescent sexual abuse	   3 	                  0  Psychological disease		  ADD, OCD, bipolar, schizophrenia   2	          2  Depression                                           1 	          1  Total: 0    a score of 3 or lower indicates low risk for opioid abuse		  a score of 4-7 indicates moderate risk for opioid abuse		  a score of 8 or higher indicates high risk for opioid abuse    REVIEW OF SYSTEMS:  CONSTITUTIONAL: No fever + fatigue  HEENT:  No difficulty hearing, no change in vision  NECK: No pain or stiffness  RESPIRATORY: No cough, wheezing, chills or hemoptysis; + occasional shortness of breath on exertion   CARDIOVASCULAR: No chest pain, palpitations, dizziness, or leg swelling + left chest wall pacemaker  GASTROINTESTINAL: + loss of appetite, decreased PO intake. + weight loss; + occasional abdominal pain. + bloating; + tightness, fullness. No nausea, vomiting; No diarrhea + constipation.   GENITOURINARY: No dysuria, frequency, hematuria, retention or incontinence + solitary right kidney, with nephrostomy   GYN: + vaginal bleeding  MUSCULOSKELETAL: + chronic low back and flank pain; No joint swelling; + generalized upper and lower motor strength weakness, no saddle anesthesia, bowel/bladder incontinence, no falls   NEURO: No headaches, No numbness/tingling b/l LE, No weakness    PHYSICAL EXAM:  GENERAL:  + fatigued & Oriented X4, cooperative, NAD, Good concentration. Speech is clear. + cachectic   RESPIRATORY: Respirations even and unlabored. Clear to auscultation bilaterally; No rales, rhonchi, wheezing, or rubs + O2 2L NC   CARDIOVASCULAR: Normal S1/S2, regular rate and rhythm; No murmurs, rubs, or gallops. No JVD. + left chest wall pacemaker   GASTROINTESTINAL:  + distended, + generalized tenderness; Bowel sounds present  GENITOURINARY: right flank nephrostomy tube draining clear yellow urine, dressing c/d/i   PERIPHERAL VASCULAR:  Extremities warm without edema. 2+ Peripheral Pulses, No cyanosis, No calf tenderness  MUSCULOSKELETAL: Motor Strength 4/5 B/L upper and lower extremities; moves all extremities equally against gravity; ROM intact; + lumbar back and flank tenderness on palpation. No paraspinal tenderness.   SKIN: Warm, dry, intact. No rashes, lesions, scars or wounds.     Risk factors associated with adverse outcomes related to opioid treatment  [ ]  Concurrent benzodiazepine use  [ ]  History/ Active substance use or alcohol use disorder  [ ] Psychiatric co-morbidity  [ ] Sleep apnea  [ ] COPD  [ ] BMI> 35  [ ] Liver dysfunction  [X ] Renal dysfunction  [ ] CHF  [ ] Smoker  [ ]  Age > 60 years    [X ]  [place]  Reviewed and Copied to Chart. See below.    Plan of care and goal oriented pain management treatment options were discussed with patient and /or primary care giver; all questions and concerns were addressed and care was aligned with patient's wishes.    Educated patient on goal oriented pain management treatment options     12-20-22 @ 14:21

## 2022-12-20 NOTE — PROGRESS NOTE ADULT - NUTRITIONAL ASSESSMENT
This patient has been assessed with a concern for Malnutrition and has been determined to have a diagnosis/diagnoses of Severe protein-calorie malnutrition and Underweight (BMI < 19).    This patient is being managed with:   Diet Regular-  Supplement Feeding Modality:  Oral  Ensure Clear Cans or Servings Per Day:  1       Frequency:  Three Times a day  Entered: Dec  8 2022 11:59PM    
This patient has been assessed with a concern for Malnutrition and has been determined to have a diagnosis/diagnoses of Severe protein-calorie malnutrition and Underweight (BMI < 19).    This patient is being managed with:   Diet NPO after Midnight-     NPO Start Date: 06-Dec-2022   NPO Start Time: 23:59  Entered: Dec  6 2022  3:56PM    Diet Regular-  DASH/TLC {Sodium & Cholesterol Restricted}  Entered: Dec  1 2022  7:35PM    
This patient has been assessed with a concern for Malnutrition and has been determined to have a diagnosis/diagnoses of Severe protein-calorie malnutrition and Underweight (BMI < 19).    This patient is being managed with:   Diet Regular-  Supplement Feeding Modality:  Oral  Ensure Enlive Cans or Servings Per Day:  1       Frequency:  Three Times a day  Entered: Dec  9 2022 10:51AM    
This patient has been assessed with a concern for Malnutrition and has been determined to have a diagnosis/diagnoses of Severe protein-calorie malnutrition and Underweight (BMI < 19).    This patient is being managed with:   Diet NPO after Midnight-     NPO Start Date: 06-Dec-2022   NPO Start Time: 23:59  Entered: Dec  6 2022  3:56PM    Diet Regular-  DASH/TLC {Sodium & Cholesterol Restricted}  Entered: Dec  1 2022  7:35PM    
This patient has been assessed with a concern for Malnutrition and has been determined to have a diagnosis/diagnoses of Severe protein-calorie malnutrition and Underweight (BMI < 19).    This patient is being managed with:   Diet NPO-  NPO for Procedure/Test     NPO Start Date: 06-Dec-2022   NPO Start Time: 23:59  Entered: Dec  2 2022 11:16AM    Diet Regular-  DASH/TLC {Sodium & Cholesterol Restricted}  Entered: Dec  1 2022  7:35PM    
This patient has been assessed with a concern for Malnutrition and has been determined to have a diagnosis/diagnoses of Severe protein-calorie malnutrition and Underweight (BMI < 19).    This patient is being managed with:   Diet Regular-  Supplement Feeding Modality:  Oral  Ensure Enlive Cans or Servings Per Day:  1       Frequency:  Three Times a day  Entered: Dec  9 2022 10:51AM    
This patient has been assessed with a concern for Malnutrition and has been determined to have a diagnosis/diagnoses of Severe protein-calorie malnutrition and Underweight (BMI < 19).    This patient is being managed with:   Diet NPO after Midnight-     NPO Start Date: 06-Dec-2022   NPO Start Time: 23:59  Entered: Dec  6 2022  3:56PM    Diet Regular-  DASH/TLC {Sodium & Cholesterol Restricted}  Entered: Dec  1 2022  7:35PM    
This patient has been assessed with a concern for Malnutrition and has been determined to have a diagnosis/diagnoses of Severe protein-calorie malnutrition and Underweight (BMI < 19).    This patient is being managed with:   Diet Soft and Bite Sized-  Entered: Dec 10 2022  1:17AM    The following pending diet order is being considered for treatment of Severe protein-calorie malnutrition and Underweight (BMI < 19):  Diet Regular-  Supplement Feeding Modality:  Oral  Ensure Enlive Cans or Servings Per Day:  1       Frequency:  Three Times a day  Entered: Dec  9 2022 10:51AM  
This patient has been assessed with a concern for Malnutrition and has been determined to have a diagnosis/diagnoses of Severe protein-calorie malnutrition and Underweight (BMI < 19).    This patient is being managed with:   Diet Regular-  Supplement Feeding Modality:  Oral  Ensure Enlive Cans or Servings Per Day:  1       Frequency:  Three Times a day  Entered: Dec  9 2022 10:51AM    
This patient has been assessed with a concern for Malnutrition and has been determined to have a diagnosis/diagnoses of Severe protein-calorie malnutrition and Underweight (BMI < 19).    This patient is being managed with:   Diet NPO after Midnight-     NPO Start Date: 06-Dec-2022   NPO Start Time: 23:59  Entered: Dec  6 2022  3:56PM    Diet Regular-  DASH/TLC {Sodium & Cholesterol Restricted}  Entered: Dec  1 2022  7:35PM    
This patient has been assessed with a concern for Malnutrition and has been determined to have a diagnosis/diagnoses of Severe protein-calorie malnutrition and Underweight (BMI < 19).    This patient is being managed with:   Diet Regular-  Supplement Feeding Modality:  Oral  Ensure Enlive Cans or Servings Per Day:  1       Frequency:  Three Times a day  Entered: Dec  9 2022 10:51AM    
This patient has been assessed with a concern for Malnutrition and has been determined to have a diagnosis/diagnoses of Severe protein-calorie malnutrition and Underweight (BMI < 19).    This patient is being managed with:   Diet NPO-  NPO for Procedure/Test     NPO Start Date: 06-Dec-2022   NPO Start Time: 23:59  Entered: Dec  2 2022 11:16AM    Diet Regular-  DASH/TLC {Sodium & Cholesterol Restricted}  Entered: Dec  1 2022  7:35PM    
This patient has been assessed with a concern for Malnutrition and has been determined to have a diagnosis/diagnoses of Severe protein-calorie malnutrition and Underweight (BMI < 19).    This patient is being managed with:   Diet Regular-  Supplement Feeding Modality:  Oral  Ensure Enlive Cans or Servings Per Day:  1       Frequency:  Three Times a day  Entered: Dec  9 2022 10:51AM    
This patient has been assessed with a concern for Malnutrition and has been determined to have a diagnosis/diagnoses of Severe protein-calorie malnutrition and Underweight (BMI < 19).    This patient is being managed with:   Diet Regular-  Supplement Feeding Modality:  Oral  Ensure Enlive Cans or Servings Per Day:  1       Frequency:  Three Times a day  Entered: Dec  9 2022 10:51AM    Diet Regular-  Supplement Feeding Modality:  Oral  Ensure Clear Cans or Servings Per Day:  1       Frequency:  Three Times a day  Entered: Dec  8 2022 11:59PM    The following pending diet order is being considered for treatment of Severe protein-calorie malnutrition and Underweight (BMI < 19):null
This patient has been assessed with a concern for Malnutrition and has been determined to have a diagnosis/diagnoses of Severe protein-calorie malnutrition and Underweight (BMI < 19).    This patient is being managed with:   Diet Regular-  Supplement Feeding Modality:  Oral  Ensure Enlive Cans or Servings Per Day:  1       Frequency:  Three Times a day  Entered: Dec  9 2022 10:51AM    
This patient has been assessed with a concern for Malnutrition and has been determined to have a diagnosis/diagnoses of Severe protein-calorie malnutrition and Underweight (BMI < 19).    This patient is being managed with:   Diet Regular-  Supplement Feeding Modality:  Oral  Ensure Enlive Cans or Servings Per Day:  1       Frequency:  Three Times a day  Entered: Dec  9 2022 10:51AM    
This patient has been assessed with a concern for Malnutrition and has been determined to have a diagnosis/diagnoses of Severe protein-calorie malnutrition and Underweight (BMI < 19).    This patient is being managed with:   Diet NPO after Midnight-     NPO Start Date: 06-Dec-2022   NPO Start Time: 23:59  Entered: Dec  6 2022  3:56PM    Diet Regular-  DASH/TLC {Sodium & Cholesterol Restricted}  Entered: Dec  1 2022  7:35PM

## 2022-12-20 NOTE — PROGRESS NOTE ADULT - ASSESSMENT
Confidential Drug Utilization Report  Search Terms: Zoë Bell, 1965Search Date: 12/05/2022 09:13:40 AM  The Drug Utilization Report below displays all of the controlled substance prescriptions, if any, that your patient has filled in the last twelve months. The information displayed on this report is compiled from pharmacy submissions to the Department, and accurately reflects the information as submitted by the pharmacies.    This report was requested by: Pat Mora | Reference #: [id number]    You have not added a ABI number. Keeping your ABI number(s) up to date on the My ABI # page will enable the separation of your prescriptions from others in the search results.    Others' Prescriptions  Patient Name: Zoë BellBirth Date: 1965  Address: [address] [place], [place] [address]Hgb: Female  Rx Written	Rx Dispensed	Drug	Quantity	Days Supply	Prescriber Name	Prescriber Abi #	Payment Method	Dispenser  11/26/2022	11/26/2022	morphine sulf 10 mg/5 ml soln	430ml	20	Duke Meng M	[id number]	[id number]  11/15/2022	11/21/2022	morphine sulf 10 mg/5 ml soln	75ml	5	Ash Garcia	[id number]	Medicare	[id number]    Patient Name: Zoë BellBirth Date: 1965  Address: [address] [place], [place] [address]Srs: Female  Rx Written	Rx Dispensed	Drug	Quantity	Days Supply	Prescriber Name	Prescriber Abi #	Payment Method	Dispenser  11/15/2022	11/15/2022	morphine sulf 10 mg/5 ml soln	75ml	5	Ash Garcia	[id number]	[facility]    Patient Name: Zoë BellBirth Date: 1965  Address: [address] [facility] [address] [place], [place] [address]Jqf: Female  Rx Written	Rx Dispensed	Drug	Quantity	Days Supply	Prescriber Name	Prescriber Abi #	Payment Method	Dispenser  10/04/2022	10/15/2022	tramadol hcl 50 mg tablet	10	5	Jai Hughes [id number]	Medicare	[facility] [id number]  08/26/2022	09/01/2022	tramadol hcl 50 mg tablet	20	5	Timi Patel	[id number]	Medicare	[facility] [id number]  08/26/2022	09/01/2022	diazepam 5 mg tablet	15	5	Timi Patel	[id number]	Medicare	[facility] [id number]  * - Drugs marked with an asterisk are compound drugs. If the compound drug is made up of more than one controlled substance, then each controlled substance will be a separate row in the table.

## 2022-12-20 NOTE — PROGRESS NOTE ADULT - PROBLEM SELECTOR PLAN 7
hx of vaginal bleeding, declined obgyn assessment at [facility]  Hx of bladder cancer likely causing bleeding   pt seen by GYN at [facility]: no intervention possible at this time..

## 2022-12-20 NOTE — PROGRESS NOTE ADULT - PROBLEM SELECTOR PLAN 8
SCD for dvt ppx  PPI.  Dispo planing home  patient not interested in hospice   supportive care   OP Onc follow up   patient medically stable for DC SCD for dvt ppx  PPI.  Dispo planing home in AM  patient not interested in hospice   supportive care   patient medically stable for DC

## 2022-12-20 NOTE — PROGRESS NOTE ADULT - PROBLEM SELECTOR PLAN 3
hgb 6.9-->9.0  MCV 23-->29.7  transferrin 156, ferritin 806 ( likely falsely elevated 2/2 malignancy), serum iron 16  Post 2 Units PRBC   hem/onc is following  no overt bleeding  hbg 8.6 12/19 - repeat CBC as pt complaint vaginal bleeding started again which is chr symptoms for her cancer   -> repeat Hg is 8.6 stabe no transfusion   iron supplement   cbc PRN   Monitor CBC and transfuse to maintain hemoglobin > 7. hgb 6.9-->9.0->8.6  MCV 23-->29.7->29.0  transferrin 156, ferritin 806 ( likely falsely elevated 2/2 malignancy), serum iron 16  Post 2 Units PRBC   + vaginal bleeding  c/w iron supplement   Monitor CBC and transfuse to maintain hemoglobin > 7.  heme/onc following- Dr. thorne

## 2022-12-20 NOTE — PROGRESS NOTE ADULT - PROBLEM SELECTOR PROBLEM 8
60
Pacemaker
Prophylactic measure
Pacemaker
Prophylactic measure
Severe protein-calorie malnutrition
Prophylactic measure
Pacemaker
Pacemaker
Prophylactic measure
Vaginal bleeding
Pacemaker
Pacemaker
Prophylactic measure
Prophylactic measure

## 2022-12-21 NOTE — CHART NOTE - NSCHARTNOTESELECT_GEN_ALL_CORE
Event Note
Nutrition Services
Nutrition Services
Palliative care/Event Note
Nutrition Services
Palliative Care/Event Note
Palliative Care/Event Note
Pt self medicating/Event Note
dizziness/Event Note

## 2022-12-21 NOTE — PROGRESS NOTE ADULT - SUBJECTIVE AND OBJECTIVE BOX
Patient is a 56y old  Female who presents with a chief complaint of dyspnea (20 Dec 2022 19:00)  Patient seen in follow up. No new issues overnight       MEDICATIONS  (STANDING):  acetaminophen     Tablet .. 325 milliGRAM(s) Oral every 4 hours  ferrous    sulfate 325 milliGRAM(s) Oral daily  folic acid 1 milliGRAM(s) Oral daily  lactulose Syrup 15 Gram(s) Oral every other day  lidocaine   4% Patch 1 Patch Transdermal every 24 hours  pantoprazole    Tablet 40 milliGRAM(s) Oral every 12 hours  polyethylene glycol 3350 17 Gram(s) Oral daily  senna 2 Tablet(s) Oral at bedtime    MEDICATIONS  (PRN):  aluminum hydroxide/magnesium hydroxide/simethicone Suspension 30 milliLiter(s) Oral every 4 hours PRN Dyspepsia  bisacodyl Suppository 10 milliGRAM(s) Rectal daily PRN Constipation  diphenhydrAMINE 25 milliGRAM(s) Oral every 4 hours PRN Itching  simethicone 80 milliGRAM(s) Chew every 6 hours PRN Gas      ROS  No fever, sweats, chills  No epistaxis, HA, sore throat  No CP, SOB, cough, sputum  No n/v/d, abd pain, melena, hematochezia  No edema  No rash  No anxiety  No back pain, joint pain  No bleeding, bruising  No dysuria, hematuria    Vital Signs Last 24 Hrs  T(C): 36.7 (21 Dec 2022 05:24), Max: 36.7 (20 Dec 2022 21:43)  T(F): 98.1 (21 Dec 2022 05:24), Max: 98.1 (20 Dec 2022 21:43)  HR: 69 (21 Dec 2022 05:24) (68 - 77)  BP: 97/60 (21 Dec 2022 05:24) (97/60 - 112/67)  BP(mean): --  RR: 18 (21 Dec 2022 05:24) (18 - 18)  SpO2: 99% (21 Dec 2022 05:24) (97% - 100%)    Parameters below as of 21 Dec 2022 05:24  Patient On (Oxygen Delivery Method): nasal cannula  O2 Flow (L/min): 2      PE  NAD  Awake, alert  Anicteric, MMM  RRR  CTAB  Abd soft, NT, ND  No c/c/e  No rash grossly  FROM                          8.6    13.31 )-----------( 357      ( 19 Dec 2022 12:21 )             29.0                Patient is a 56y old  Female who presents with a chief complaint of dyspnea (20 Dec 2022 19:00)  Patient seen in follow up. She notes she may be going home today although she would like some O2 to go home on. She notes she has frequent blood in urine as well.     MEDICATIONS  (STANDING):  acetaminophen     Tablet .. 325 milliGRAM(s) Oral every 4 hours  ferrous    sulfate 325 milliGRAM(s) Oral daily  folic acid 1 milliGRAM(s) Oral daily  lactulose Syrup 15 Gram(s) Oral every other day  lidocaine   4% Patch 1 Patch Transdermal every 24 hours  pantoprazole    Tablet 40 milliGRAM(s) Oral every 12 hours  polyethylene glycol 3350 17 Gram(s) Oral daily  senna 2 Tablet(s) Oral at bedtime    MEDICATIONS  (PRN):  aluminum hydroxide/magnesium hydroxide/simethicone Suspension 30 milliLiter(s) Oral every 4 hours PRN Dyspepsia  bisacodyl Suppository 10 milliGRAM(s) Rectal daily PRN Constipation  diphenhydrAMINE 25 milliGRAM(s) Oral every 4 hours PRN Itching  simethicone 80 milliGRAM(s) Chew every 6 hours PRN Gas      ROS  + hematuria  Pain   Chronic SOB     Vital Signs Last 24 Hrs  T(C): 36.7 (21 Dec 2022 05:24), Max: 36.7 (20 Dec 2022 21:43)  T(F): 98.1 (21 Dec 2022 05:24), Max: 98.1 (20 Dec 2022 21:43)  HR: 69 (21 Dec 2022 05:24) (68 - 77)  BP: 97/60 (21 Dec 2022 05:24) (97/60 - 112/67)  BP(mean): --  RR: 18 (21 Dec 2022 05:24) (18 - 18)  SpO2: 99% (21 Dec 2022 05:24) (97% - 100%)    Parameters below as of 21 Dec 2022 05:24  Patient On (Oxygen Delivery Method): nasal cannula  O2 Flow (L/min): 2      PE  NAD  Awake, alert  Anicteric, MMM  Normal respiratory effort   Abd NT, ND   No c/c/e. Frail   No rash grossly  FROM                          8.6    13.31 )-----------( 357      ( 19 Dec 2022 12:21 )             29.0

## 2022-12-21 NOTE — CHART NOTE - NSCHARTNOTEFT_GEN_A_CORE
Reassessment:     Patient is a 56y old  Female who presents with a chief complaint of dyspnea (21 Dec 2022 09:14)      Factors impacting intake: [ ] none [ ] nausea  [ ] vomiting [ ] diarrhea [ ] constipation  [ ]chewing problems [ ] swallowing issues  [X ] other: Metastatic Bladder Cancer, Anemia      Diet Prescription: Diet, Regular:   Supplement Feeding Modality:  Oral  Ensure Enlive Cans or Servings Per Day:  1       Frequency:  Three Times a day (12-09-22 @ 10:52)    Intake: Patient visited in am, states still with "pain", tolerated breakfast meals & possible for d/c ed after lunch? seen by Diet Tech yesterday for "food selections", intake 30-40% & was "drinking Ensure supplements but no anymore", unable to tolerate, presently on pain management. If no d/fernie to home rec. c/w least restrictive diet as medically feasible. RD available.     Daily weight: Nursing to monitor daily weights  % Weight Change:    Pertinent Medications: MEDICATIONS  (STANDING):  acetaminophen     Tablet .. 325 milliGRAM(s) Oral every 4 hours  ferrous    sulfate 325 milliGRAM(s) Oral daily  folic acid 1 milliGRAM(s) Oral daily  lactulose Syrup 15 Gram(s) Oral every other day  lidocaine   4% Patch 1 Patch Transdermal every 24 hours  pantoprazole    Tablet 40 milliGRAM(s) Oral every 12 hours  polyethylene glycol 3350 17 Gram(s) Oral daily  senna 2 Tablet(s) Oral at bedtime    MEDICATIONS  (PRN):  aluminum hydroxide/magnesium hydroxide/simethicone Suspension 30 milliLiter(s) Oral every 4 hours PRN Dyspepsia  bisacodyl Suppository 10 milliGRAM(s) Rectal daily PRN Constipation  diphenhydrAMINE 25 milliGRAM(s) Oral every 4 hours PRN Itching  morphine   Solution 5 milliGRAM(s) Oral every 3 hours PRN Severe Pain (7 - 10)  simethicone 80 milliGRAM(s) Chew every 6 hours PRN Gas    Pertinent Labs:  12-17 Alb 2.1 g/dL<L>     CAPILLARY BLOOD GLUCOSE      Skin: Intact    Estimated Needs:   [X ] no change since previous assessment  [ ] recalculated:     Previous Nutrition Diagnosis:   [ ] Inadequate Energy Intake [ ]Inadequate Oral Intake [ ] Excessive Energy Intake   [ ] Underweight [ ] Increased Nutrient Needs [ ] Overweight/Obesity   [ ] Altered GI Function [ ] Unintended Weight Loss [ ] Food & Nutrition Related Knowledge Deficit [Severe] Malnutrition     Nutrition Diagnosis is [X] ongoing  [ ] resolved [ ] not applicable     Interventions: To meet nutrition needs     Recommend  [ ] Change Diet To:  [ ] Nutrition Supplement  [ ] Nutrition Support  [ X] Other: Nursing to continue with meal set up and encouragement with aspiration precaution      Monitoring and Evaluation:   [X ] PO intake [ x ] Tolerance to diet prescription [ x ] weights [ x ] labs[ x ] follow up per protocol  [ ] other:

## 2022-12-21 NOTE — PROGRESS NOTE ADULT - ASSESSMENT
JEF HOUSE is a 56y Female who presents with a chief complaint of dyspnea.    Metastatic Bladder Cancer  - Patient previously followed with [facility] and at [facility]. Now in process of switching to [facility].  - Last chemotherapy was in May 2022. She never had immunotherapy. If patient can make it to outpatient appointments and be assessed there, she may be a candidate. Patient has refused discharge with ongoing complaints of pain and dyspnea and she understood continued hospitalization will delay any potential treatment outpatient.   - Last imaging in October 2022 had shown worsening pulmonary and liver metastases.   - No systemic therapy while inpatient.  - Ongoing pain management per primary team     Anemia  - Required blood transfusions. Mild iron deficiency. Some history of vaginal bleeding as well   - Continue oral iron   - Monitor CBC and transfuse to maintain hemoglobin > 7.    Hypercalcemia  - Continue to monitor. Patient had received one dose of pamidronate  - Follow up CMP with next labs     Dyspnea  - Patient refused CTA. Ultrasound did not show any DVT.  - Likely fro underlying disease.    Will continue to follow.    Dionicio Mullen D.O  Hematology Oncology   [facility]  [phone] ( Office)   Evenings and weekends please call MD on call or office    JEF HOUSE is a 56y Female who presents with a chief complaint of dyspnea.    Metastatic Bladder Cancer  - Patient previously followed with [facility] and at [facility]. Now in process of switching to [facility].  - Last chemotherapy was in May 2022. She never had immunotherapy. If patient can make it to outpatient appointments and be assessed there, she may be a candidate.   - Last imaging in October 2022 had shown worsening pulmonary and liver metastases.   - No systemic therapy while inpatient. [facility]. Follow up with Oncology on D/C to discuss treatment options    - Ongoing pain management per primary team     Anemia  - Required blood transfusions. Mild iron deficiency. She endorses some hematuria mild likely from underlying disease   - Continue oral iron   - Monitor CBC and transfuse to maintain hemoglobin > 7.  - Follow up with urology as well on D/C     Hypercalcemia  - Continue to monitor. Patient had received one dose of pamidronate  - Follow up CMP with next labs     Dyspnea  - Patient refused CTA. Ultrasound did not show any DVT.  - Likely from underlying disease.  - OP f/u with oncology for tx     Will continue to follow.    Dionicio Mullen D.O  Hematology Oncology   [facility]  [phone] ( Office)   Evenings and weekends please call MD on call or office    JEF HOUSE is a 56y Female who presents with a chief complaint of dyspnea.    Metastatic Bladder Cancer  - Patient previously followed with [facility] and at [facility]. Now in process of switching to [facility].  - Last chemotherapy was in May 2022. She never had immunotherapy. If patient can make it to outpatient appointments and be assessed there, she may be a candidate.   - Last imaging in October 2022 had shown worsening pulmonary and liver metastases.   - No systemic therapy while inpatient. She [facility]. Follow up with Oncology on D/C to discuss treatment options    - Ongoing pain management per primary team     Anemia  - Required blood transfusions. Mild iron deficiency. She endorses some hematuria mild likely from underlying disease   - Continue oral iron   - Recommend checking  CBC and transfuse to maintain hemoglobin > 7 or plts less than 20 or if bleeding less than 50  - Follow up with urology as well on D/C     Hypercalcemia  - Continue to monitor. Patient had received one dose of pamidronate  - Follow up CMP with next labs     Dyspnea  - Patient refused CTA. Ultrasound did not show any DVT.  - Likely from underlying disease.  - OP f/u with oncology for tx     Will continue to follow.    Dionicio Mullen D.O  Hematology Oncology   [facility]  [phone] ( Office)   Evenings and weekends please call MD on call or office

## 2022-12-21 NOTE — DISCHARGE NOTE NURSING/CASE MANAGEMENT/SOCIAL WORK - PATIENT PORTAL LINK FT
You can access the FollowMyHealth Patient Portal offered by [facility] by registering at the following website: [email or web address]. By joining [employer]’s FollowMyHealth portal, you will also be able to view your health information using other applications (apps) compatible with our system.

## 2022-12-21 NOTE — PROGRESS NOTE ADULT - REASON FOR ADMISSION
dyspnea

## 2022-12-21 NOTE — PROGRESS NOTE ADULT - PROVIDER SPECIALTY LIST ADULT
Infectious Disease
Infectious Disease
Internal Medicine
Pain Medicine
Pain Medicine
Heme/Onc
Heme/Onc
Infectious Disease
Internal Medicine
Pain Medicine
Heme/Onc
Infectious Disease
Internal Medicine
Heme/Onc
Internal Medicine
Heme/Onc
Pain Medicine
Pain Medicine
Internal Medicine
Pain Medicine
Internal Medicine
Pain Medicine
Internal Medicine
Pain Medicine
Internal Medicine

## 2022-12-21 NOTE — DISCHARGE NOTE NURSING/CASE MANAGEMENT/SOCIAL WORK - NSDCPEFALRISK_GEN_ALL_CORE
For information on Fall & Injury Prevention, visit: [email or web address] OR  [email or web address] OR  https://www.cdc.gov/steadi/patient.html

## 2022-12-23 PROBLEM — C68.9 TRANSITIONAL CELL CARCINOMA: Status: ACTIVE | Noted: 2021-06-21

## 2022-12-23 PROBLEM — Z86.39 HISTORY OF HYPERPARATHYROIDISM: Status: RESOLVED | Noted: 2022-01-01 | Resolved: 2022-01-01

## 2022-12-24 NOTE — ED PROVIDER NOTE - OBJECTIVE STATEMENT
57 year old female PMH asthma, HLD, HTN, metastatic bladder cancer, nephrostomy tube, mets to lungs and on O2 coming in for transfusion. states saw oncologist and Hgb decreasing and onc was going to set up a transfusion next week but pt prefers to get it in the hospital at this time. states SOB with exertion worsening.

## 2022-12-24 NOTE — ED PROVIDER NOTE - PROGRESS NOTE DETAILS
hgb 7.9. pt was supposed to be transfused. will transfuse 1 unit and dc. cbc improved. discharge home.

## 2022-12-24 NOTE — ED ADULT NURSE NOTE - PAIN RATING/NUMBER SCALE (0-10): REST
GASTROENTEROLOGY PROGRESS NOTE  Date of Service: 12/30/2017    ASSESSMENT:  Ms. Wharton is a 49 year old female with HTN, anxiety, refractory bipolar disorder with multiple suicide attempts, hypothyroidism, and recurrent C diff s/p FMT x 2 and multiple abx regimens who was admitted after intentional overdose with amlodipine, NyQuil, Tylenol (treated with NAC), and vodka.  Her hospital course has been complicated by prolonged intubation with ARDS, treatment with NAC for acetaminophen, elevated liver tests, and recurrent c-diff following antibiotic administration.  Please see prior GI notes for more in depth discussion of liver injury and concern for pancreatitis.      C diff colitis:  Ms. Wharton has a history of recurrent C diff infection that has responded to therapy and often recurred following administration of antibiotics for other indications.  Expect there is also a component of IBS that contributes to her diarrhea and abdominal pain as well as vancomycin causing loose stools.  History of C diff detailed below, based on chart review:  - 8/2015: FMT  - 4/2016: Had UTI, given abx, developed C diff and treated with oral vancomycin for 4 months  - 5/2016: C diff negative  - 6/2016: C diff positive  - 8/8/2016: FMT.  Developed UTI and treated with antibiotics within a week of receiving FMT  - 8/13/2016: C diff positive  - 8/21/2016: C diff positive  - 9/2016: Given 10 day course of fidoxamicin with improvement in symptoms  - 10/11/2016: C diff negative  - 1/2017: Had cystits, given abx with vancomycin ppx  - 3/9/2017: C diff positive (was admitted for suicide attempt and had been off medications for weeks to months).  Treatment started with vancomycin taper  - 3/23/2017: With persistent diarrhea, switched vanc taper to fidoxamicin  - 12/13/2017: Current admission, per report had formed stool on admission  - 12/14/2017: Started cefepime and vancomycin IV  - 12/17/2017: Switched to ceftriaxone and flagyl for aspiration  "PNA  - 12/22/2017: C diff positive, started on oral vancomycin, in addition to flagyl, cefepime, and vanc IV  - 12/24/2017: Cefepime and vanc IV stopped, vanc PO and flagyl continued  - 12/26/2017: Flagyl stopped     Ms. Wharton seems to be improving with less stools for the past 2 days (from 10 daily down to 3-4 daily) with no nocturnal stooling. CRP improving (19-->14). Leukocytosis improving as well. Notably imaging does not show significant colonic dilation.   Pt reports she is being evaluated for a UTI - would recommend treating only if true infection. If treatment necessary would recommend aminoglycosides such as gentamicin as these are \"gut-sparing\" antibiotics; would recommend shortest appropriate course.         RECOMMENDATIONS:  - Continue vancomycin PO 125mg QID  - Continue close monitoring and documentation stool output including number, amount, consistency and incontinence  - Diet as tolerated, encourage PO intake, including nutritional supplements  - Trend CRP and evaluate for decompensation with KUB for megacolon.       Aleah Carlin MD  Gastroenterology - Luminal Service  _______________________________________________________________  S:  Nurses' notes reviewed. No acute events overnight.   Ms. Wharton reports a good night's sleep after transfer to the floor last night (out of the ICU).  She had mild, self-limited nausea after being moved, but reports this has not recurred. Appetite has been good and she tolerated dinner last night and breakfast this morning without symptoms.   She does feel diarrhea is improving, notably only 4 BMs in the last 24 hours (as compared with 10 stools 2 days ago). She states her last BM was sometime yesterday evening (around around 8-10 pm per nursing documentation).   She has no other acute concerns today.     O:  Temp:  [98.4  F (36.9  C)-99  F (37.2  C)] 98.4  F (36.9  C)  Heart Rate:  [78-99] 85  Resp:  [18-30] 18  BP: (117-145)/(73-85) 126/80  SpO2:  [92 " %-100 %] 98 %  Gen: Pt sitting up in bed, eating breakfast, in NAD. Pleasant and cooperative on exam.  HEENT: NC, AT, sclerae anicteric , MMM  Cardiac: RRR, nl S1, S2   Resp: Clear anteriorly  Abd: Normoactive BS, abd soft, NT, ND  Ext: Warm, well-perfused  Neuro: Alert, answers questions appropriately    LABS:  BMP  Recent Labs  Lab 12/29/17  0317 12/28/17  0306 12/27/17  1004 12/26/17  0520    141 138 142   POTASSIUM 4.1 4.2 4.6 3.4   CHLORIDE 104 107 105 104   ISAURO 8.4* 8.2* 8.5 7.8*   CO2 27 26 24 28   BUN 16 19 26 42*   CR 0.63 0.59 0.60 0.73   GLC 90 73 219* 127*     CBC  Recent Labs  Lab 12/29/17 0317 12/28/17  0306 12/27/17  1004 12/26/17  0520   WBC 17.2* 18.6* 22.8* 20.6*   RBC 2.69* 2.67* 3.09* 2.85*   HGB 8.5* 8.4* 9.6* 8.9*   HCT 27.3* 26.9* 30.9* 27.5*   * 101* 100 97   MCH 31.6 31.5 31.1 31.2   MCHC 31.1* 31.2* 31.1* 32.4   RDW 19.8* 20.8* 21.3* 21.0*    345 363 299     INR  Recent Labs  Lab 12/27/17  2103   INR 1.09     LFTs  Recent Labs  Lab 12/29/17 0317 12/28/17  0306 12/27/17  1004 12/26/17  0520   ALKPHOS 930* 1017* 1143* 966*   * 164* 190* 194*   * 163* 166* 148*   BILITOTAL 2.1* 2.7* 4.1* 4.1*   PROTTOTAL 5.7* 5.6* 6.1* 5.2*   ALBUMIN 2.2* 2.3* 2.5* 2.3*      PANC  Recent Labs  Lab 12/26/17  0520   LIPASE 1775*        0

## 2022-12-24 NOTE — ED PROVIDER NOTE - PATIENT PORTAL LINK FT
You can access the FollowMyHealth Patient Portal offered by [facility] by registering at the following website: [email or web address]. By joining [employer]’s FollowMyHealth portal, you will also be able to view your health information using other applications (apps) compatible with our system.

## 2023-01-01 ENCOUNTER — TRANSCRIPTION ENCOUNTER (OUTPATIENT)
Age: 58
End: 2023-01-01

## 2023-01-01 ENCOUNTER — NON-APPOINTMENT (OUTPATIENT)
Age: 58
End: 2023-01-01

## 2023-01-01 ENCOUNTER — APPOINTMENT (OUTPATIENT)
Dept: HEMATOLOGY ONCOLOGY | Facility: CLINIC | Age: 58
End: 2023-01-01

## 2023-01-01 ENCOUNTER — INPATIENT (INPATIENT)
Facility: HOSPITAL | Age: 58
LOS: 21 days | DRG: 640 | End: 2023-01-28
Attending: GENERAL PRACTICE | Admitting: GENERAL PRACTICE
Payer: MEDICARE

## 2023-01-01 ENCOUNTER — RESULT REVIEW (OUTPATIENT)
Age: 58
End: 2023-01-01

## 2023-01-01 VITALS
SYSTOLIC BLOOD PRESSURE: 96 MMHG | HEIGHT: 64 IN | HEART RATE: 86 BPM | RESPIRATION RATE: 18 BRPM | WEIGHT: 78.04 LBS | OXYGEN SATURATION: 99 % | DIASTOLIC BLOOD PRESSURE: 65 MMHG

## 2023-01-01 VITALS — SYSTOLIC BLOOD PRESSURE: 82 MMHG | DIASTOLIC BLOOD PRESSURE: 50 MMHG | HEART RATE: 100 BPM

## 2023-01-01 DIAGNOSIS — C67.9 MALIGNANT NEOPLASM OF BLADDER, UNSPECIFIED: ICD-10-CM

## 2023-01-01 DIAGNOSIS — D72.829 ELEVATED WHITE BLOOD CELL COUNT, UNSPECIFIED: ICD-10-CM

## 2023-01-01 DIAGNOSIS — R53.2 FUNCTIONAL QUADRIPLEGIA: ICD-10-CM

## 2023-01-01 DIAGNOSIS — Z51.5 ENCOUNTER FOR PALLIATIVE CARE: ICD-10-CM

## 2023-01-01 DIAGNOSIS — Z71.89 OTHER SPECIFIED COUNSELING: ICD-10-CM

## 2023-01-01 DIAGNOSIS — Z29.9 ENCOUNTER FOR PROPHYLACTIC MEASURES, UNSPECIFIED: ICD-10-CM

## 2023-01-01 DIAGNOSIS — G89.3 NEOPLASM RELATED PAIN (ACUTE) (CHRONIC): ICD-10-CM

## 2023-01-01 DIAGNOSIS — R07.9 CHEST PAIN, UNSPECIFIED: ICD-10-CM

## 2023-01-01 DIAGNOSIS — K59.00 CONSTIPATION, UNSPECIFIED: ICD-10-CM

## 2023-01-01 DIAGNOSIS — Z98.89 OTHER SPECIFIED POSTPROCEDURAL STATES: Chronic | ICD-10-CM

## 2023-01-01 DIAGNOSIS — Z95.0 PRESENCE OF CARDIAC PACEMAKER: Chronic | ICD-10-CM

## 2023-01-01 DIAGNOSIS — E83.52 HYPERCALCEMIA: ICD-10-CM

## 2023-01-01 DIAGNOSIS — Z87.448 PERSONAL HISTORY OF OTHER DISEASES OF URINARY SYSTEM: Chronic | ICD-10-CM

## 2023-01-01 LAB
24R-OH-CALCIDIOL SERPL-MCNC: 12.1 NG/ML — LOW (ref 30–80)
ALBUMIN SERPL ELPH-MCNC: 2 G/DL — LOW (ref 3.3–5)
ALBUMIN SERPL ELPH-MCNC: 2.2 G/DL — LOW (ref 3.3–5)
ALBUMIN SERPL ELPH-MCNC: 2.3 G/DL — LOW (ref 3.3–5)
ALBUMIN SERPL ELPH-MCNC: 2.3 G/DL — LOW (ref 3.3–5)
ALBUMIN SERPL ELPH-MCNC: 3 G/DL — LOW (ref 3.3–5)
ALBUMIN SERPL ELPH-MCNC: 3.5 G/DL — SIGNIFICANT CHANGE UP (ref 3.3–5)
ALP SERPL-CCNC: 247 U/L — HIGH (ref 40–120)
ALP SERPL-CCNC: 258 U/L — HIGH (ref 40–120)
ALP SERPL-CCNC: 280 U/L — HIGH (ref 40–120)
ALP SERPL-CCNC: 339 U/L — HIGH (ref 40–120)
ALP SERPL-CCNC: 339 U/L — HIGH (ref 40–120)
ALP SERPL-CCNC: 586 U/L — HIGH (ref 40–120)
ALT FLD-CCNC: 18 U/L — SIGNIFICANT CHANGE UP (ref 10–45)
ALT FLD-CCNC: 19 U/L — SIGNIFICANT CHANGE UP (ref 10–45)
ALT FLD-CCNC: 23 U/L — SIGNIFICANT CHANGE UP (ref 10–45)
ALT FLD-CCNC: 23 U/L — SIGNIFICANT CHANGE UP (ref 10–45)
ALT FLD-CCNC: 29 U/L — SIGNIFICANT CHANGE UP (ref 10–45)
ALT FLD-CCNC: 55 U/L — HIGH (ref 10–45)
ANION GAP SERPL CALC-SCNC: 10 MMOL/L — SIGNIFICANT CHANGE UP (ref 5–17)
ANION GAP SERPL CALC-SCNC: 11 MMOL/L — SIGNIFICANT CHANGE UP (ref 5–17)
ANION GAP SERPL CALC-SCNC: 11 MMOL/L — SIGNIFICANT CHANGE UP (ref 5–17)
ANION GAP SERPL CALC-SCNC: 13 MMOL/L — SIGNIFICANT CHANGE UP (ref 5–17)
ANION GAP SERPL CALC-SCNC: 13 MMOL/L — SIGNIFICANT CHANGE UP (ref 5–17)
ANION GAP SERPL CALC-SCNC: 14 MMOL/L — SIGNIFICANT CHANGE UP (ref 5–17)
ANION GAP SERPL CALC-SCNC: 17 MMOL/L — SIGNIFICANT CHANGE UP (ref 5–17)
ANION GAP SERPL CALC-SCNC: 17 MMOL/L — SIGNIFICANT CHANGE UP (ref 5–17)
ANION GAP SERPL CALC-SCNC: 20 MMOL/L — HIGH (ref 5–17)
ANISOCYTOSIS BLD QL: SLIGHT — SIGNIFICANT CHANGE UP
APPEARANCE UR: ABNORMAL
APPEARANCE UR: ABNORMAL
AST SERPL-CCNC: 24 U/L — SIGNIFICANT CHANGE UP (ref 10–40)
AST SERPL-CCNC: 26 U/L — SIGNIFICANT CHANGE UP (ref 10–40)
AST SERPL-CCNC: 28 U/L — SIGNIFICANT CHANGE UP (ref 10–40)
AST SERPL-CCNC: 31 U/L — SIGNIFICANT CHANGE UP (ref 10–40)
AST SERPL-CCNC: 32 U/L — SIGNIFICANT CHANGE UP (ref 10–40)
AST SERPL-CCNC: 73 U/L — HIGH (ref 10–40)
BACTERIA # UR AUTO: NEGATIVE — SIGNIFICANT CHANGE UP
BACTERIA # UR AUTO: NEGATIVE — SIGNIFICANT CHANGE UP
BASE EXCESS BLDV CALC-SCNC: 0 MMOL/L — SIGNIFICANT CHANGE UP (ref -2–3)
BASOPHILS # BLD AUTO: 0 K/UL — SIGNIFICANT CHANGE UP (ref 0–0.2)
BASOPHILS # BLD AUTO: 0.01 K/UL — SIGNIFICANT CHANGE UP (ref 0–0.2)
BASOPHILS # BLD AUTO: 0.01 K/UL — SIGNIFICANT CHANGE UP (ref 0–0.2)
BASOPHILS NFR BLD AUTO: 0 % — SIGNIFICANT CHANGE UP (ref 0–2)
BASOPHILS NFR BLD AUTO: 0.1 % — SIGNIFICANT CHANGE UP (ref 0–2)
BASOPHILS NFR BLD AUTO: 0.1 % — SIGNIFICANT CHANGE UP (ref 0–2)
BILIRUB SERPL-MCNC: 0.1 MG/DL — LOW (ref 0.2–1.2)
BILIRUB SERPL-MCNC: 0.1 MG/DL — LOW (ref 0.2–1.2)
BILIRUB SERPL-MCNC: 0.2 MG/DL — SIGNIFICANT CHANGE UP (ref 0.2–1.2)
BILIRUB SERPL-MCNC: 0.3 MG/DL — SIGNIFICANT CHANGE UP (ref 0.2–1.2)
BILIRUB SERPL-MCNC: 0.4 MG/DL — SIGNIFICANT CHANGE UP (ref 0.2–1.2)
BILIRUB SERPL-MCNC: 2.2 MG/DL — HIGH (ref 0.2–1.2)
BILIRUB UR-MCNC: NEGATIVE — SIGNIFICANT CHANGE UP
BILIRUB UR-MCNC: NEGATIVE — SIGNIFICANT CHANGE UP
BLD GP AB SCN SERPL QL: NEGATIVE — SIGNIFICANT CHANGE UP
BUN SERPL-MCNC: 15 MG/DL — SIGNIFICANT CHANGE UP (ref 7–23)
BUN SERPL-MCNC: 23 MG/DL — SIGNIFICANT CHANGE UP (ref 7–23)
BUN SERPL-MCNC: 24 MG/DL — HIGH (ref 7–23)
BUN SERPL-MCNC: 25 MG/DL — HIGH (ref 7–23)
BUN SERPL-MCNC: 25 MG/DL — HIGH (ref 7–23)
BUN SERPL-MCNC: 30 MG/DL — HIGH (ref 7–23)
BUN SERPL-MCNC: 32 MG/DL — HIGH (ref 7–23)
BUN SERPL-MCNC: 32 MG/DL — HIGH (ref 7–23)
BUN SERPL-MCNC: 34 MG/DL — HIGH (ref 7–23)
BUN SERPL-MCNC: 35 MG/DL — HIGH (ref 7–23)
BURR CELLS BLD QL SMEAR: SLIGHT — SIGNIFICANT CHANGE UP
CA-I BLD-SCNC: 1.75 MMOL/L — CRITICAL HIGH (ref 1.15–1.33)
CA-I BLD-SCNC: 1.82 MMOL/L — CRITICAL HIGH (ref 1.15–1.33)
CA-I BLD-SCNC: 2.07 MMOL/L — CRITICAL HIGH (ref 1.15–1.33)
CA-I SERPL-SCNC: 1.91 MMOL/L — CRITICAL HIGH (ref 1.15–1.33)
CALCIUM SERPL-MCNC: 10.8 MG/DL — HIGH (ref 8.4–10.5)
CALCIUM SERPL-MCNC: 11.1 MG/DL — HIGH (ref 8.4–10.5)
CALCIUM SERPL-MCNC: 12.6 MG/DL — HIGH (ref 8.4–10.5)
CALCIUM SERPL-MCNC: 13 MG/DL — CRITICAL HIGH (ref 8.4–10.5)
CALCIUM SERPL-MCNC: 13.8 MG/DL — CRITICAL HIGH (ref 8.4–10.5)
CALCIUM SERPL-MCNC: 14 MG/DL — CRITICAL HIGH (ref 8.4–10.5)
CALCIUM SERPL-MCNC: 14.9 MG/DL — CRITICAL HIGH (ref 8.4–10.5)
CALCIUM SERPL-MCNC: 15.5 MG/DL — CRITICAL HIGH (ref 8.4–10.5)
CALCIUM SERPL-MCNC: 7.3 MG/DL — LOW (ref 8.4–10.5)
CALCIUM SERPL-MCNC: 8.3 MG/DL — LOW (ref 8.4–10.5)
CALCIUM SERPL-MCNC: 8.5 MG/DL — SIGNIFICANT CHANGE UP (ref 8.4–10.5)
CALCIUM SERPL-MCNC: 9.1 MG/DL — SIGNIFICANT CHANGE UP (ref 8.4–10.5)
CALCIUM SERPL-MCNC: 9.3 MG/DL — SIGNIFICANT CHANGE UP (ref 8.4–10.5)
CALCIUM SERPL-MCNC: 9.4 MG/DL — SIGNIFICANT CHANGE UP (ref 8.4–10.5)
CHLORIDE BLDV-SCNC: 104 MMOL/L — SIGNIFICANT CHANGE UP (ref 96–108)
CHLORIDE SERPL-SCNC: 100 MMOL/L — SIGNIFICANT CHANGE UP (ref 96–108)
CHLORIDE SERPL-SCNC: 101 MMOL/L — SIGNIFICANT CHANGE UP (ref 96–108)
CHLORIDE SERPL-SCNC: 103 MMOL/L — SIGNIFICANT CHANGE UP (ref 96–108)
CHLORIDE SERPL-SCNC: 104 MMOL/L — SIGNIFICANT CHANGE UP (ref 96–108)
CHLORIDE SERPL-SCNC: 105 MMOL/L — SIGNIFICANT CHANGE UP (ref 96–108)
CHLORIDE SERPL-SCNC: 110 MMOL/L — HIGH (ref 96–108)
CHLORIDE SERPL-SCNC: 110 MMOL/L — HIGH (ref 96–108)
CHLORIDE SERPL-SCNC: 111 MMOL/L — HIGH (ref 96–108)
CHLORIDE SERPL-SCNC: 99 MMOL/L — SIGNIFICANT CHANGE UP (ref 96–108)
CO2 BLDV-SCNC: 27 MMOL/L — HIGH (ref 22–26)
CO2 SERPL-SCNC: 11 MMOL/L — LOW (ref 22–31)
CO2 SERPL-SCNC: 20 MMOL/L — LOW (ref 22–31)
CO2 SERPL-SCNC: 20 MMOL/L — LOW (ref 22–31)
CO2 SERPL-SCNC: 21 MMOL/L — LOW (ref 22–31)
CO2 SERPL-SCNC: 21 MMOL/L — LOW (ref 22–31)
CO2 SERPL-SCNC: 23 MMOL/L — SIGNIFICANT CHANGE UP (ref 22–31)
CO2 SERPL-SCNC: 24 MMOL/L — SIGNIFICANT CHANGE UP (ref 22–31)
CO2 SERPL-SCNC: 26 MMOL/L — SIGNIFICANT CHANGE UP (ref 22–31)
CO2 SERPL-SCNC: 27 MMOL/L — SIGNIFICANT CHANGE UP (ref 22–31)
COLOR SPEC: ABNORMAL
COLOR SPEC: SIGNIFICANT CHANGE UP
CREAT SERPL-MCNC: 0.6 MG/DL — SIGNIFICANT CHANGE UP (ref 0.5–1.3)
CREAT SERPL-MCNC: 0.7 MG/DL — SIGNIFICANT CHANGE UP (ref 0.5–1.3)
CREAT SERPL-MCNC: 0.7 MG/DL — SIGNIFICANT CHANGE UP (ref 0.5–1.3)
CREAT SERPL-MCNC: 0.72 MG/DL — SIGNIFICANT CHANGE UP (ref 0.5–1.3)
CREAT SERPL-MCNC: 0.77 MG/DL — SIGNIFICANT CHANGE UP (ref 0.5–1.3)
CREAT SERPL-MCNC: 0.83 MG/DL — SIGNIFICANT CHANGE UP (ref 0.5–1.3)
CREAT SERPL-MCNC: 0.83 MG/DL — SIGNIFICANT CHANGE UP (ref 0.5–1.3)
CREAT SERPL-MCNC: 0.85 MG/DL — SIGNIFICANT CHANGE UP (ref 0.5–1.3)
CREAT SERPL-MCNC: 0.9 MG/DL — SIGNIFICANT CHANGE UP (ref 0.5–1.3)
CREAT SERPL-MCNC: 0.97 MG/DL — SIGNIFICANT CHANGE UP (ref 0.5–1.3)
CREAT SERPL-MCNC: 0.99 MG/DL — SIGNIFICANT CHANGE UP (ref 0.5–1.3)
CREAT SERPL-MCNC: 1.04 MG/DL — SIGNIFICANT CHANGE UP (ref 0.5–1.3)
CREAT SERPL-MCNC: <0.3 MG/DL — LOW (ref 0.5–1.3)
CULTURE RESULTS: NO GROWTH — SIGNIFICANT CHANGE UP
DACRYOCYTES BLD QL SMEAR: SLIGHT — SIGNIFICANT CHANGE UP
DIFF PNL FLD: ABNORMAL
DIFF PNL FLD: ABNORMAL
EGFR: 102 ML/MIN/1.73M2 — SIGNIFICANT CHANGE UP
EGFR: 102 ML/MIN/1.73M2 — SIGNIFICANT CHANGE UP
EGFR: 106 ML/MIN/1.73M2 — SIGNIFICANT CHANGE UP
EGFR: 125 ML/MIN/1.73M2 — SIGNIFICANT CHANGE UP
EGFR: 63 ML/MIN/1.73M2 — SIGNIFICANT CHANGE UP
EGFR: 67 ML/MIN/1.73M2 — SIGNIFICANT CHANGE UP
EGFR: 69 ML/MIN/1.73M2 — SIGNIFICANT CHANGE UP
EGFR: 76 ML/MIN/1.73M2 — SIGNIFICANT CHANGE UP
EGFR: 81 ML/MIN/1.73M2 — SIGNIFICANT CHANGE UP
EGFR: 83 ML/MIN/1.73M2 — SIGNIFICANT CHANGE UP
EGFR: 83 ML/MIN/1.73M2 — SIGNIFICANT CHANGE UP
EGFR: 91 ML/MIN/1.73M2 — SIGNIFICANT CHANGE UP
EGFR: 99 ML/MIN/1.73M2 — SIGNIFICANT CHANGE UP
ELLIPTOCYTES BLD QL SMEAR: SLIGHT — SIGNIFICANT CHANGE UP
EOSINOPHIL # BLD AUTO: 0 K/UL — SIGNIFICANT CHANGE UP (ref 0–0.5)
EOSINOPHIL # BLD AUTO: 0.03 K/UL — SIGNIFICANT CHANGE UP (ref 0–0.5)
EOSINOPHIL # BLD AUTO: 0.08 K/UL — SIGNIFICANT CHANGE UP (ref 0–0.5)
EOSINOPHIL NFR BLD AUTO: 0 % — SIGNIFICANT CHANGE UP (ref 0–6)
EOSINOPHIL NFR BLD AUTO: 0.2 % — SIGNIFICANT CHANGE UP (ref 0–6)
EOSINOPHIL NFR BLD AUTO: 0.4 % — SIGNIFICANT CHANGE UP (ref 0–6)
EPI CELLS # UR: 1 /HPF — SIGNIFICANT CHANGE UP
EPI CELLS # UR: SIGNIFICANT CHANGE UP
FOLATE SERPL-MCNC: 6.1 NG/ML — SIGNIFICANT CHANGE UP
GAS PNL BLDV: 138 MMOL/L — SIGNIFICANT CHANGE UP (ref 136–145)
GAS PNL BLDV: SIGNIFICANT CHANGE UP
GLUCOSE BLDC GLUCOMTR-MCNC: 101 MG/DL — HIGH (ref 70–99)
GLUCOSE BLDC GLUCOMTR-MCNC: 102 MG/DL — HIGH (ref 70–99)
GLUCOSE BLDC GLUCOMTR-MCNC: 103 MG/DL — HIGH (ref 70–99)
GLUCOSE BLDC GLUCOMTR-MCNC: 104 MG/DL — HIGH (ref 70–99)
GLUCOSE BLDC GLUCOMTR-MCNC: 104 MG/DL — HIGH (ref 70–99)
GLUCOSE BLDC GLUCOMTR-MCNC: 107 MG/DL — HIGH (ref 70–99)
GLUCOSE BLDC GLUCOMTR-MCNC: 111 MG/DL — HIGH (ref 70–99)
GLUCOSE BLDC GLUCOMTR-MCNC: 111 MG/DL — HIGH (ref 70–99)
GLUCOSE BLDC GLUCOMTR-MCNC: 114 MG/DL — HIGH (ref 70–99)
GLUCOSE BLDC GLUCOMTR-MCNC: 116 MG/DL — HIGH (ref 70–99)
GLUCOSE BLDC GLUCOMTR-MCNC: 117 MG/DL — HIGH (ref 70–99)
GLUCOSE BLDC GLUCOMTR-MCNC: 121 MG/DL — HIGH (ref 70–99)
GLUCOSE BLDC GLUCOMTR-MCNC: 123 MG/DL — HIGH (ref 70–99)
GLUCOSE BLDC GLUCOMTR-MCNC: 133 MG/DL — HIGH (ref 70–99)
GLUCOSE BLDC GLUCOMTR-MCNC: 137 MG/DL — HIGH (ref 70–99)
GLUCOSE BLDC GLUCOMTR-MCNC: 149 MG/DL — HIGH (ref 70–99)
GLUCOSE BLDC GLUCOMTR-MCNC: 152 MG/DL — HIGH (ref 70–99)
GLUCOSE BLDC GLUCOMTR-MCNC: 158 MG/DL — HIGH (ref 70–99)
GLUCOSE BLDC GLUCOMTR-MCNC: 163 MG/DL — HIGH (ref 70–99)
GLUCOSE BLDC GLUCOMTR-MCNC: 178 MG/DL — HIGH (ref 70–99)
GLUCOSE BLDC GLUCOMTR-MCNC: 32 MG/DL — CRITICAL LOW (ref 70–99)
GLUCOSE BLDC GLUCOMTR-MCNC: 45 MG/DL — CRITICAL LOW (ref 70–99)
GLUCOSE BLDC GLUCOMTR-MCNC: 49 MG/DL — CRITICAL LOW (ref 70–99)
GLUCOSE BLDC GLUCOMTR-MCNC: 51 MG/DL — CRITICAL LOW (ref 70–99)
GLUCOSE BLDC GLUCOMTR-MCNC: 51 MG/DL — CRITICAL LOW (ref 70–99)
GLUCOSE BLDC GLUCOMTR-MCNC: 54 MG/DL — CRITICAL LOW (ref 70–99)
GLUCOSE BLDC GLUCOMTR-MCNC: 54 MG/DL — CRITICAL LOW (ref 70–99)
GLUCOSE BLDC GLUCOMTR-MCNC: 64 MG/DL — LOW (ref 70–99)
GLUCOSE BLDC GLUCOMTR-MCNC: 64 MG/DL — LOW (ref 70–99)
GLUCOSE BLDC GLUCOMTR-MCNC: 67 MG/DL — LOW (ref 70–99)
GLUCOSE BLDC GLUCOMTR-MCNC: 68 MG/DL — LOW (ref 70–99)
GLUCOSE BLDC GLUCOMTR-MCNC: 69 MG/DL — LOW (ref 70–99)
GLUCOSE BLDC GLUCOMTR-MCNC: 72 MG/DL — SIGNIFICANT CHANGE UP (ref 70–99)
GLUCOSE BLDC GLUCOMTR-MCNC: 73 MG/DL — SIGNIFICANT CHANGE UP (ref 70–99)
GLUCOSE BLDC GLUCOMTR-MCNC: 74 MG/DL — SIGNIFICANT CHANGE UP (ref 70–99)
GLUCOSE BLDC GLUCOMTR-MCNC: 75 MG/DL — SIGNIFICANT CHANGE UP (ref 70–99)
GLUCOSE BLDC GLUCOMTR-MCNC: 76 MG/DL — SIGNIFICANT CHANGE UP (ref 70–99)
GLUCOSE BLDC GLUCOMTR-MCNC: 77 MG/DL — SIGNIFICANT CHANGE UP (ref 70–99)
GLUCOSE BLDC GLUCOMTR-MCNC: 78 MG/DL — SIGNIFICANT CHANGE UP (ref 70–99)
GLUCOSE BLDC GLUCOMTR-MCNC: 80 MG/DL — SIGNIFICANT CHANGE UP (ref 70–99)
GLUCOSE BLDC GLUCOMTR-MCNC: 82 MG/DL — SIGNIFICANT CHANGE UP (ref 70–99)
GLUCOSE BLDC GLUCOMTR-MCNC: 84 MG/DL — SIGNIFICANT CHANGE UP (ref 70–99)
GLUCOSE BLDC GLUCOMTR-MCNC: 85 MG/DL — SIGNIFICANT CHANGE UP (ref 70–99)
GLUCOSE BLDC GLUCOMTR-MCNC: 86 MG/DL — SIGNIFICANT CHANGE UP (ref 70–99)
GLUCOSE BLDC GLUCOMTR-MCNC: 89 MG/DL — SIGNIFICANT CHANGE UP (ref 70–99)
GLUCOSE BLDC GLUCOMTR-MCNC: 89 MG/DL — SIGNIFICANT CHANGE UP (ref 70–99)
GLUCOSE BLDC GLUCOMTR-MCNC: 90 MG/DL — SIGNIFICANT CHANGE UP (ref 70–99)
GLUCOSE BLDC GLUCOMTR-MCNC: 91 MG/DL — SIGNIFICANT CHANGE UP (ref 70–99)
GLUCOSE BLDC GLUCOMTR-MCNC: 92 MG/DL — SIGNIFICANT CHANGE UP (ref 70–99)
GLUCOSE BLDC GLUCOMTR-MCNC: 93 MG/DL — SIGNIFICANT CHANGE UP (ref 70–99)
GLUCOSE BLDC GLUCOMTR-MCNC: 96 MG/DL — SIGNIFICANT CHANGE UP (ref 70–99)
GLUCOSE BLDC GLUCOMTR-MCNC: 97 MG/DL — SIGNIFICANT CHANGE UP (ref 70–99)
GLUCOSE BLDC GLUCOMTR-MCNC: 97 MG/DL — SIGNIFICANT CHANGE UP (ref 70–99)
GLUCOSE BLDC GLUCOMTR-MCNC: 99 MG/DL — SIGNIFICANT CHANGE UP (ref 70–99)
GLUCOSE BLDC GLUCOMTR-MCNC: 99 MG/DL — SIGNIFICANT CHANGE UP (ref 70–99)
GLUCOSE BLDV-MCNC: 108 MG/DL — HIGH (ref 70–99)
GLUCOSE SERPL-MCNC: 102 MG/DL — HIGH (ref 70–99)
GLUCOSE SERPL-MCNC: 104 MG/DL — HIGH (ref 70–99)
GLUCOSE SERPL-MCNC: 109 MG/DL — HIGH (ref 70–99)
GLUCOSE SERPL-MCNC: 118 MG/DL — HIGH (ref 70–99)
GLUCOSE SERPL-MCNC: 142 MG/DL — HIGH (ref 70–99)
GLUCOSE SERPL-MCNC: 50 MG/DL — CRITICAL LOW (ref 70–99)
GLUCOSE SERPL-MCNC: 59 MG/DL — LOW (ref 70–99)
GLUCOSE SERPL-MCNC: 67 MG/DL — LOW (ref 70–99)
GLUCOSE SERPL-MCNC: 68 MG/DL — LOW (ref 70–99)
GLUCOSE SERPL-MCNC: 76 MG/DL — SIGNIFICANT CHANGE UP (ref 70–99)
GLUCOSE SERPL-MCNC: 76 MG/DL — SIGNIFICANT CHANGE UP (ref 70–99)
GLUCOSE SERPL-MCNC: 88 MG/DL — SIGNIFICANT CHANGE UP (ref 70–99)
GLUCOSE SERPL-MCNC: 92 MG/DL — SIGNIFICANT CHANGE UP (ref 70–99)
GLUCOSE UR QL: NEGATIVE — SIGNIFICANT CHANGE UP
GLUCOSE UR QL: NEGATIVE — SIGNIFICANT CHANGE UP
GRAN CASTS # UR COMP ASSIST: 2 /LPF — SIGNIFICANT CHANGE UP
HCO3 BLDV-SCNC: 26 MMOL/L — SIGNIFICANT CHANGE UP (ref 22–29)
HCT VFR BLD CALC: 19.3 % — CRITICAL LOW (ref 34.5–45)
HCT VFR BLD CALC: 22.3 % — LOW (ref 34.5–45)
HCT VFR BLD CALC: 23.8 % — LOW (ref 34.5–45)
HCT VFR BLD CALC: 24.6 % — LOW (ref 34.5–45)
HCT VFR BLD CALC: 24.8 % — LOW (ref 34.5–45)
HCT VFR BLD CALC: 26 % — LOW (ref 34.5–45)
HCT VFR BLD CALC: 27 % — LOW (ref 34.5–45)
HCT VFR BLD CALC: 27.7 % — LOW (ref 34.5–45)
HCT VFR BLD CALC: 27.9 % — LOW (ref 34.5–45)
HCT VFR BLD CALC: 28 % — LOW (ref 34.5–45)
HCT VFR BLD CALC: 28.2 % — LOW (ref 34.5–45)
HCT VFR BLD CALC: 28.9 % — LOW (ref 34.5–45)
HCT VFR BLD CALC: 29.7 % — LOW (ref 34.5–45)
HCT VFR BLD CALC: 30 % — LOW (ref 34.5–45)
HCT VFR BLD CALC: 30.6 % — LOW (ref 34.5–45)
HCT VFR BLD CALC: 31 % — LOW (ref 34.5–45)
HCT VFR BLD CALC: 32.4 % — LOW (ref 34.5–45)
HCT VFR BLDA CALC: 28 % — LOW (ref 34.5–46.5)
HGB BLD CALC-MCNC: 9.2 G/DL — LOW (ref 11.7–16.1)
HGB BLD-MCNC: 6 G/DL — CRITICAL LOW (ref 11.5–15.5)
HGB BLD-MCNC: 6.1 G/DL — CRITICAL LOW (ref 11.5–15.5)
HGB BLD-MCNC: 7.2 G/DL — LOW (ref 11.5–15.5)
HGB BLD-MCNC: 7.4 G/DL — LOW (ref 11.5–15.5)
HGB BLD-MCNC: 7.7 G/DL — LOW (ref 11.5–15.5)
HGB BLD-MCNC: 8.1 G/DL — LOW (ref 11.5–15.5)
HGB BLD-MCNC: 8.4 G/DL — LOW (ref 11.5–15.5)
HGB BLD-MCNC: 8.5 G/DL — LOW (ref 11.5–15.5)
HGB BLD-MCNC: 8.6 G/DL — LOW (ref 11.5–15.5)
HGB BLD-MCNC: 8.6 G/DL — LOW (ref 11.5–15.5)
HGB BLD-MCNC: 8.7 G/DL — LOW (ref 11.5–15.5)
HGB BLD-MCNC: 8.7 G/DL — LOW (ref 11.5–15.5)
HGB BLD-MCNC: 8.9 G/DL — LOW (ref 11.5–15.5)
HGB BLD-MCNC: 9.2 G/DL — LOW (ref 11.5–15.5)
HGB BLD-MCNC: 9.4 G/DL — LOW (ref 11.5–15.5)
HGB BLD-MCNC: 9.6 G/DL — LOW (ref 11.5–15.5)
HGB BLD-MCNC: 9.6 G/DL — LOW (ref 11.5–15.5)
HYALINE CASTS # UR AUTO: 1 /LPF — SIGNIFICANT CHANGE UP (ref 0–7)
HYALINE CASTS # UR AUTO: ABNORMAL /LPF
IMM GRANULOCYTES NFR BLD AUTO: 0.4 % — SIGNIFICANT CHANGE UP (ref 0–0.9)
IMM GRANULOCYTES NFR BLD AUTO: 0.5 % — SIGNIFICANT CHANGE UP (ref 0–0.9)
KETONES UR-MCNC: ABNORMAL
KETONES UR-MCNC: NEGATIVE — SIGNIFICANT CHANGE UP
LACTATE BLDV-MCNC: 2.4 MMOL/L — HIGH (ref 0.5–2)
LEUKOCYTE ESTERASE UR-ACNC: ABNORMAL
LEUKOCYTE ESTERASE UR-ACNC: ABNORMAL
LYMPHOCYTES # BLD AUTO: 0.1 K/UL — LOW (ref 1–3.3)
LYMPHOCYTES # BLD AUTO: 0.9 % — LOW (ref 13–44)
LYMPHOCYTES # BLD AUTO: 1.36 K/UL — SIGNIFICANT CHANGE UP (ref 1–3.3)
LYMPHOCYTES # BLD AUTO: 1.62 K/UL — SIGNIFICANT CHANGE UP (ref 1–3.3)
LYMPHOCYTES # BLD AUTO: 8.2 % — LOW (ref 13–44)
LYMPHOCYTES # BLD AUTO: 8.8 % — LOW (ref 13–44)
MACROCYTES BLD QL: SLIGHT — SIGNIFICANT CHANGE UP
MAGNESIUM SERPL-MCNC: 0.7 MG/DL — CRITICAL LOW (ref 1.6–2.6)
MAGNESIUM SERPL-MCNC: 1.5 MG/DL — LOW (ref 1.6–2.6)
MAGNESIUM SERPL-MCNC: 1.7 MG/DL — SIGNIFICANT CHANGE UP (ref 1.6–2.6)
MAGNESIUM SERPL-MCNC: 1.8 MG/DL — SIGNIFICANT CHANGE UP (ref 1.6–2.6)
MAGNESIUM SERPL-MCNC: 1.9 MG/DL — SIGNIFICANT CHANGE UP (ref 1.6–2.6)
MAGNESIUM SERPL-MCNC: 2 MG/DL — SIGNIFICANT CHANGE UP (ref 1.6–2.6)
MANUAL SMEAR VERIFICATION: SIGNIFICANT CHANGE UP
MCHC RBC-ENTMCNC: 25.4 PG — LOW (ref 27–34)
MCHC RBC-ENTMCNC: 25.5 PG — LOW (ref 27–34)
MCHC RBC-ENTMCNC: 25.6 PG — LOW (ref 27–34)
MCHC RBC-ENTMCNC: 25.9 PG — LOW (ref 27–34)
MCHC RBC-ENTMCNC: 25.9 PG — LOW (ref 27–34)
MCHC RBC-ENTMCNC: 26.1 PG — LOW (ref 27–34)
MCHC RBC-ENTMCNC: 26.2 PG — LOW (ref 27–34)
MCHC RBC-ENTMCNC: 26.4 PG — LOW (ref 27–34)
MCHC RBC-ENTMCNC: 26.5 PG — LOW (ref 27–34)
MCHC RBC-ENTMCNC: 26.6 PG — LOW (ref 27–34)
MCHC RBC-ENTMCNC: 26.7 PG — LOW (ref 27–34)
MCHC RBC-ENTMCNC: 26.8 PG — LOW (ref 27–34)
MCHC RBC-ENTMCNC: 26.9 PG — LOW (ref 27–34)
MCHC RBC-ENTMCNC: 27.1 PG — SIGNIFICANT CHANGE UP (ref 27–34)
MCHC RBC-ENTMCNC: 27.3 PG — SIGNIFICANT CHANGE UP (ref 27–34)
MCHC RBC-ENTMCNC: 27.4 GM/DL — LOW (ref 32–36)
MCHC RBC-ENTMCNC: 27.4 PG — SIGNIFICANT CHANGE UP (ref 27–34)
MCHC RBC-ENTMCNC: 27.7 PG — SIGNIFICANT CHANGE UP (ref 27–34)
MCHC RBC-ENTMCNC: 29.6 GM/DL — LOW (ref 32–36)
MCHC RBC-ENTMCNC: 29.6 GM/DL — LOW (ref 32–36)
MCHC RBC-ENTMCNC: 29.8 GM/DL — LOW (ref 32–36)
MCHC RBC-ENTMCNC: 30 GM/DL — LOW (ref 32–36)
MCHC RBC-ENTMCNC: 30.1 GM/DL — LOW (ref 32–36)
MCHC RBC-ENTMCNC: 30.3 GM/DL — LOW (ref 32–36)
MCHC RBC-ENTMCNC: 30.5 GM/DL — LOW (ref 32–36)
MCHC RBC-ENTMCNC: 30.5 GM/DL — LOW (ref 32–36)
MCHC RBC-ENTMCNC: 30.7 G/DL — LOW (ref 32–36)
MCHC RBC-ENTMCNC: 30.7 GM/DL — LOW (ref 32–36)
MCHC RBC-ENTMCNC: 31 GM/DL — LOW (ref 32–36)
MCHC RBC-ENTMCNC: 31 GM/DL — LOW (ref 32–36)
MCHC RBC-ENTMCNC: 31.1 GM/DL — LOW (ref 32–36)
MCHC RBC-ENTMCNC: 32.9 GM/DL — SIGNIFICANT CHANGE UP (ref 32–36)
MCV RBC AUTO: 79.4 FL — LOW (ref 80–100)
MCV RBC AUTO: 82.2 FL — SIGNIFICANT CHANGE UP (ref 80–100)
MCV RBC AUTO: 82.7 FL — SIGNIFICANT CHANGE UP (ref 80–100)
MCV RBC AUTO: 83.2 FL — SIGNIFICANT CHANGE UP (ref 80–100)
MCV RBC AUTO: 83.3 FL — SIGNIFICANT CHANGE UP (ref 80–100)
MCV RBC AUTO: 84.1 FL — SIGNIFICANT CHANGE UP (ref 80–100)
MCV RBC AUTO: 84.3 FL — SIGNIFICANT CHANGE UP (ref 80–100)
MCV RBC AUTO: 86.4 FL — SIGNIFICANT CHANGE UP (ref 80–100)
MCV RBC AUTO: 87.7 FL — SIGNIFICANT CHANGE UP (ref 80–100)
MCV RBC AUTO: 88.4 FL — SIGNIFICANT CHANGE UP (ref 80–100)
MCV RBC AUTO: 88.9 FL — SIGNIFICANT CHANGE UP (ref 80–100)
MCV RBC AUTO: 89.3 FL — SIGNIFICANT CHANGE UP (ref 80–100)
MCV RBC AUTO: 90 FL — SIGNIFICANT CHANGE UP (ref 80–100)
MCV RBC AUTO: 90.3 FL — SIGNIFICANT CHANGE UP (ref 80–100)
MCV RBC AUTO: 90.7 FL — SIGNIFICANT CHANGE UP (ref 80–100)
MCV RBC AUTO: 91.5 FL — SIGNIFICANT CHANGE UP (ref 80–100)
MCV RBC AUTO: 99.1 FL — SIGNIFICANT CHANGE UP (ref 80–100)
METAMYELOCYTES # FLD: 2.6 % — HIGH (ref 0–0)
MONOCYTES # BLD AUTO: 0 K/UL — SIGNIFICANT CHANGE UP (ref 0–0.9)
MONOCYTES # BLD AUTO: 0.64 K/UL — SIGNIFICANT CHANGE UP (ref 0–0.9)
MONOCYTES # BLD AUTO: 1.19 K/UL — HIGH (ref 0–0.9)
MONOCYTES NFR BLD AUTO: 0 % — LOW (ref 2–14)
MONOCYTES NFR BLD AUTO: 3.9 % — SIGNIFICANT CHANGE UP (ref 2–14)
MONOCYTES NFR BLD AUTO: 6.5 % — SIGNIFICANT CHANGE UP (ref 2–14)
MRSA PCR RESULT.: SIGNIFICANT CHANGE UP
NEUTROPHILS # BLD AUTO: 10.79 K/UL — HIGH (ref 1.8–7.4)
NEUTROPHILS # BLD AUTO: 14.38 K/UL — HIGH (ref 1.8–7.4)
NEUTROPHILS # BLD AUTO: 15.37 K/UL — HIGH (ref 1.8–7.4)
NEUTROPHILS NFR BLD AUTO: 83.8 % — HIGH (ref 43–77)
NEUTROPHILS NFR BLD AUTO: 85 % — HIGH (ref 43–77)
NEUTROPHILS NFR BLD AUTO: 87.1 % — HIGH (ref 43–77)
NEUTS BAND # BLD: 11.5 % — HIGH (ref 0–8)
NITRITE UR-MCNC: NEGATIVE — SIGNIFICANT CHANGE UP
NITRITE UR-MCNC: NEGATIVE — SIGNIFICANT CHANGE UP
NRBC # BLD: 0 /100 WBCS — SIGNIFICANT CHANGE UP (ref 0–0)
NT-PROBNP SERPL-SCNC: 510 PG/ML — HIGH (ref 0–300)
PCO2 BLDV: 47 MMHG — HIGH (ref 39–42)
PH BLDV: 7.35 — SIGNIFICANT CHANGE UP (ref 7.32–7.43)
PH UR: 6 — SIGNIFICANT CHANGE UP (ref 5–8)
PH UR: 8 — SIGNIFICANT CHANGE UP (ref 5–8)
PHOSPHATE SERPL-MCNC: 3.1 MG/DL — SIGNIFICANT CHANGE UP (ref 2.5–4.5)
PLAT MORPH BLD: NORMAL — SIGNIFICANT CHANGE UP
PLATELET # BLD AUTO: 205 K/UL — SIGNIFICANT CHANGE UP (ref 150–400)
PLATELET # BLD AUTO: 225 K/UL — SIGNIFICANT CHANGE UP (ref 150–400)
PLATELET # BLD AUTO: 232 K/UL — SIGNIFICANT CHANGE UP (ref 150–400)
PLATELET # BLD AUTO: 260 K/UL — SIGNIFICANT CHANGE UP (ref 150–400)
PLATELET # BLD AUTO: 316 K/UL — SIGNIFICANT CHANGE UP (ref 150–400)
PLATELET # BLD AUTO: 36 K/UL — LOW (ref 150–400)
PLATELET # BLD AUTO: 38 K/UL — LOW (ref 150–400)
PLATELET # BLD AUTO: 40 K/UL — LOW (ref 150–400)
PLATELET # BLD AUTO: 46 K/UL — LOW (ref 150–400)
PLATELET # BLD AUTO: 49 K/UL — LOW (ref 150–400)
PLATELET # BLD AUTO: 58 K/UL — LOW (ref 150–400)
PLATELET # BLD AUTO: 62 K/UL — LOW (ref 150–400)
PLATELET # BLD AUTO: 67 K/UL — LOW (ref 150–400)
PLATELET # BLD AUTO: 70 K/UL — LOW (ref 150–400)
PLATELET # BLD AUTO: 93 K/UL — LOW (ref 150–400)
PLATELET # BLD AUTO: 95 K/UL — LOW (ref 150–400)
PLATELET # BLD AUTO: 99 K/UL — LOW (ref 150–400)
PO2 BLDV: 62 MMHG — HIGH (ref 25–45)
POIKILOCYTOSIS BLD QL AUTO: SLIGHT — SIGNIFICANT CHANGE UP
POLYCHROMASIA BLD QL SMEAR: SLIGHT — SIGNIFICANT CHANGE UP
POTASSIUM BLDV-SCNC: 3.5 MMOL/L — SIGNIFICANT CHANGE UP (ref 3.5–5.1)
POTASSIUM SERPL-MCNC: 3 MMOL/L — LOW (ref 3.5–5.3)
POTASSIUM SERPL-MCNC: 3.1 MMOL/L — LOW (ref 3.5–5.3)
POTASSIUM SERPL-MCNC: 3.1 MMOL/L — LOW (ref 3.5–5.3)
POTASSIUM SERPL-MCNC: 3.2 MMOL/L — LOW (ref 3.5–5.3)
POTASSIUM SERPL-MCNC: 3.6 MMOL/L — SIGNIFICANT CHANGE UP (ref 3.5–5.3)
POTASSIUM SERPL-MCNC: 3.6 MMOL/L — SIGNIFICANT CHANGE UP (ref 3.5–5.3)
POTASSIUM SERPL-MCNC: 3.7 MMOL/L — SIGNIFICANT CHANGE UP (ref 3.5–5.3)
POTASSIUM SERPL-MCNC: 3.7 MMOL/L — SIGNIFICANT CHANGE UP (ref 3.5–5.3)
POTASSIUM SERPL-MCNC: 3.8 MMOL/L — SIGNIFICANT CHANGE UP (ref 3.5–5.3)
POTASSIUM SERPL-MCNC: 4.1 MMOL/L — SIGNIFICANT CHANGE UP (ref 3.5–5.3)
POTASSIUM SERPL-MCNC: 4.5 MMOL/L — SIGNIFICANT CHANGE UP (ref 3.5–5.3)
POTASSIUM SERPL-SCNC: 3 MMOL/L — LOW (ref 3.5–5.3)
POTASSIUM SERPL-SCNC: 3.1 MMOL/L — LOW (ref 3.5–5.3)
POTASSIUM SERPL-SCNC: 3.1 MMOL/L — LOW (ref 3.5–5.3)
POTASSIUM SERPL-SCNC: 3.2 MMOL/L — LOW (ref 3.5–5.3)
POTASSIUM SERPL-SCNC: 3.6 MMOL/L — SIGNIFICANT CHANGE UP (ref 3.5–5.3)
POTASSIUM SERPL-SCNC: 3.6 MMOL/L — SIGNIFICANT CHANGE UP (ref 3.5–5.3)
POTASSIUM SERPL-SCNC: 3.7 MMOL/L — SIGNIFICANT CHANGE UP (ref 3.5–5.3)
POTASSIUM SERPL-SCNC: 3.7 MMOL/L — SIGNIFICANT CHANGE UP (ref 3.5–5.3)
POTASSIUM SERPL-SCNC: 3.8 MMOL/L — SIGNIFICANT CHANGE UP (ref 3.5–5.3)
POTASSIUM SERPL-SCNC: 4.1 MMOL/L — SIGNIFICANT CHANGE UP (ref 3.5–5.3)
POTASSIUM SERPL-SCNC: 4.5 MMOL/L — SIGNIFICANT CHANGE UP (ref 3.5–5.3)
PROCALCITONIN SERPL-MCNC: 0.95 NG/ML — HIGH (ref 0.02–0.1)
PROCALCITONIN SERPL-MCNC: 1.29 NG/ML — HIGH (ref 0.02–0.1)
PROT SERPL-MCNC: 5.1 G/DL — LOW (ref 6–8.3)
PROT SERPL-MCNC: 5.4 G/DL — LOW (ref 6–8.3)
PROT SERPL-MCNC: 5.4 G/DL — LOW (ref 6–8.3)
PROT SERPL-MCNC: 5.6 G/DL — LOW (ref 6–8.3)
PROT SERPL-MCNC: 6.8 G/DL — SIGNIFICANT CHANGE UP (ref 6–8.3)
PROT SERPL-MCNC: 8.1 G/DL — SIGNIFICANT CHANGE UP (ref 6–8.3)
PROT UR-MCNC: >600
PROT UR-MCNC: ABNORMAL
PTH RELATED PROT SERPL-MCNC: 2.8 PMOL/L — SIGNIFICANT CHANGE UP
PTH-INTACT FLD-MCNC: 11 PG/ML — LOW (ref 15–65)
RAPID RVP RESULT: SIGNIFICANT CHANGE UP
RBC # BLD: 2.25 M/UL — LOW (ref 3.8–5.2)
RBC # BLD: 2.32 M/UL — LOW (ref 3.8–5.2)
RBC # BLD: 2.71 M/UL — LOW (ref 3.8–5.2)
RBC # BLD: 2.83 M/UL — LOW (ref 3.8–5.2)
RBC # BLD: 2.88 M/UL — LOW (ref 3.8–5.2)
RBC # BLD: 3.1 M/UL — LOW (ref 3.8–5.2)
RBC # BLD: 3.1 M/UL — LOW (ref 3.8–5.2)
RBC # BLD: 3.11 M/UL — LOW (ref 3.8–5.2)
RBC # BLD: 3.24 M/UL — LOW (ref 3.8–5.2)
RBC # BLD: 3.25 M/UL — LOW (ref 3.8–5.2)
RBC # BLD: 3.32 M/UL — LOW (ref 3.8–5.2)
RBC # BLD: 3.36 M/UL — LOW (ref 3.8–5.2)
RBC # BLD: 3.37 M/UL — LOW (ref 3.8–5.2)
RBC # BLD: 3.42 M/UL — LOW (ref 3.8–5.2)
RBC # BLD: 3.54 M/UL — LOW (ref 3.8–5.2)
RBC # BLD: 3.63 M/UL — LOW (ref 3.8–5.2)
RBC # BLD: 3.63 M/UL — LOW (ref 3.8–5.2)
RBC # BLD: 3.75 M/UL — LOW (ref 3.8–5.2)
RBC # FLD: 15.9 % — HIGH (ref 10.3–14.5)
RBC # FLD: 16 % — HIGH (ref 10.3–14.5)
RBC # FLD: 16.4 % — HIGH (ref 10.3–14.5)
RBC # FLD: 16.5 % — HIGH (ref 10.3–14.5)
RBC # FLD: 16.8 % — HIGH (ref 10.3–14.5)
RBC # FLD: 17.5 % — HIGH (ref 10.3–14.5)
RBC # FLD: 17.8 % — HIGH (ref 10.3–14.5)
RBC # FLD: 18.1 % — HIGH (ref 10.3–14.5)
RBC # FLD: 18.1 % — HIGH (ref 10.3–14.5)
RBC # FLD: 18.3 % — HIGH (ref 10.3–14.5)
RBC # FLD: 21.2 % — HIGH (ref 10.3–14.5)
RBC # FLD: 21.4 % — HIGH (ref 10.3–14.5)
RBC # FLD: 23.6 % — HIGH (ref 10.3–14.5)
RBC # FLD: 24.1 % — HIGH (ref 10.3–14.5)
RBC # FLD: 24.2 % — HIGH (ref 10.3–14.5)
RBC # FLD: 25.1 % — HIGH (ref 10.3–14.5)
RBC # FLD: 25.2 % — HIGH (ref 10.3–14.5)
RBC BLD AUTO: ABNORMAL
RBC CASTS # UR COMP ASSIST: 36 /HPF — HIGH (ref 0–4)
RBC CASTS # UR COMP ASSIST: SIGNIFICANT CHANGE UP /HPF (ref 0–4)
RETICS #: 31.6 K/UL — SIGNIFICANT CHANGE UP (ref 25–125)
RETICS/RBC NFR: 0.9 % — SIGNIFICANT CHANGE UP (ref 0.5–2.5)
RH IG SCN BLD-IMP: POSITIVE — SIGNIFICANT CHANGE UP
S AUREUS DNA NOSE QL NAA+PROBE: SIGNIFICANT CHANGE UP
SAO2 % BLDV: 91.9 % — HIGH (ref 67–88)
SARS-COV-2 RNA SPEC QL NAA+PROBE: SIGNIFICANT CHANGE UP
SARS-COV-2 RNA SPEC QL NAA+PROBE: SIGNIFICANT CHANGE UP
SODIUM SERPL-SCNC: 133 MMOL/L — LOW (ref 135–145)
SODIUM SERPL-SCNC: 135 MMOL/L — SIGNIFICANT CHANGE UP (ref 135–145)
SODIUM SERPL-SCNC: 138 MMOL/L — SIGNIFICANT CHANGE UP (ref 135–145)
SODIUM SERPL-SCNC: 139 MMOL/L — SIGNIFICANT CHANGE UP (ref 135–145)
SODIUM SERPL-SCNC: 139 MMOL/L — SIGNIFICANT CHANGE UP (ref 135–145)
SODIUM SERPL-SCNC: 141 MMOL/L — SIGNIFICANT CHANGE UP (ref 135–145)
SODIUM SERPL-SCNC: 142 MMOL/L — SIGNIFICANT CHANGE UP (ref 135–145)
SODIUM SERPL-SCNC: 143 MMOL/L — SIGNIFICANT CHANGE UP (ref 135–145)
SODIUM SERPL-SCNC: 146 MMOL/L — HIGH (ref 135–145)
SODIUM SERPL-SCNC: 147 MMOL/L — HIGH (ref 135–145)
SODIUM SERPL-SCNC: 147 MMOL/L — HIGH (ref 135–145)
SP GR SPEC: 1.02 — SIGNIFICANT CHANGE UP (ref 1.01–1.02)
SP GR SPEC: 1.02 — SIGNIFICANT CHANGE UP (ref 1.01–1.02)
SPECIMEN SOURCE: SIGNIFICANT CHANGE UP
TARGETS BLD QL SMEAR: SLIGHT — SIGNIFICANT CHANGE UP
TROPONIN T, HIGH SENSITIVITY RESULT: 16 NG/L — SIGNIFICANT CHANGE UP (ref 0–51)
TROPONIN T, HIGH SENSITIVITY RESULT: 18 NG/L — SIGNIFICANT CHANGE UP (ref 0–51)
URATE SERPL-MCNC: 8.5 MG/DL — HIGH (ref 2.5–7)
URATE SERPL-MCNC: 8.7 MG/DL — HIGH (ref 2.5–7)
URATE UR-MCNC: 22.8 MG/DL — SIGNIFICANT CHANGE UP
UROBILINOGEN FLD QL: NEGATIVE — SIGNIFICANT CHANGE UP
UROBILINOGEN FLD QL: NEGATIVE — SIGNIFICANT CHANGE UP
VIT B12 SERPL-MCNC: >2000 PG/ML — HIGH (ref 232–1245)
VIT D25+D1,25 OH+D1,25 PNL SERPL-MCNC: 6.1 PG/ML — LOW (ref 19.9–79.3)
WBC # BLD: 10.06 K/UL — SIGNIFICANT CHANGE UP (ref 3.8–10.5)
WBC # BLD: 10.97 K/UL — HIGH (ref 3.8–10.5)
WBC # BLD: 11.18 K/UL — HIGH (ref 3.8–10.5)
WBC # BLD: 11.28 K/UL — HIGH (ref 3.8–10.5)
WBC # BLD: 11.81 K/UL — HIGH (ref 3.8–10.5)
WBC # BLD: 12.09 K/UL — HIGH (ref 3.8–10.5)
WBC # BLD: 12.3 K/UL — HIGH (ref 3.8–10.5)
WBC # BLD: 12.65 K/UL — HIGH (ref 3.8–10.5)
WBC # BLD: 13.04 K/UL — HIGH (ref 3.8–10.5)
WBC # BLD: 14.89 K/UL — HIGH (ref 3.8–10.5)
WBC # BLD: 15.5 K/UL — HIGH (ref 3.8–10.5)
WBC # BLD: 15.88 K/UL — HIGH (ref 3.8–10.5)
WBC # BLD: 16.41 K/UL — HIGH (ref 3.8–10.5)
WBC # BLD: 16.51 K/UL — HIGH (ref 3.8–10.5)
WBC # BLD: 17.85 K/UL — HIGH (ref 3.8–10.5)
WBC # BLD: 18.35 K/UL — HIGH (ref 3.8–10.5)
WBC # BLD: 8.55 K/UL — SIGNIFICANT CHANGE UP (ref 3.8–10.5)
WBC # FLD AUTO: 10.06 K/UL — SIGNIFICANT CHANGE UP (ref 3.8–10.5)
WBC # FLD AUTO: 10.97 K/UL — HIGH (ref 3.8–10.5)
WBC # FLD AUTO: 11.18 K/UL — HIGH (ref 3.8–10.5)
WBC # FLD AUTO: 11.28 K/UL — HIGH (ref 3.8–10.5)
WBC # FLD AUTO: 11.81 K/UL — HIGH (ref 3.8–10.5)
WBC # FLD AUTO: 12.09 K/UL — HIGH (ref 3.8–10.5)
WBC # FLD AUTO: 12.3 K/UL — HIGH (ref 3.8–10.5)
WBC # FLD AUTO: 12.65 K/UL — HIGH (ref 3.8–10.5)
WBC # FLD AUTO: 13.04 K/UL — HIGH (ref 3.8–10.5)
WBC # FLD AUTO: 14.89 K/UL — HIGH (ref 3.8–10.5)
WBC # FLD AUTO: 15.5 K/UL — HIGH (ref 3.8–10.5)
WBC # FLD AUTO: 15.88 K/UL — HIGH (ref 3.8–10.5)
WBC # FLD AUTO: 16.41 K/UL — HIGH (ref 3.8–10.5)
WBC # FLD AUTO: 16.51 K/UL — HIGH (ref 3.8–10.5)
WBC # FLD AUTO: 17.85 K/UL — HIGH (ref 3.8–10.5)
WBC # FLD AUTO: 18.35 K/UL — HIGH (ref 3.8–10.5)
WBC # FLD AUTO: 8.55 K/UL — SIGNIFICANT CHANGE UP (ref 3.8–10.5)
WBC UR QL: 116 /HPF — HIGH (ref 0–5)
WBC UR QL: >50

## 2023-01-01 PROCEDURE — 82435 ASSAY OF BLOOD CHLORIDE: CPT

## 2023-01-01 PROCEDURE — P9045: CPT

## 2023-01-01 PROCEDURE — 85014 HEMATOCRIT: CPT

## 2023-01-01 PROCEDURE — 99233 SBSQ HOSP IP/OBS HIGH 50: CPT

## 2023-01-01 PROCEDURE — U0005: CPT

## 2023-01-01 PROCEDURE — 99223 1ST HOSP IP/OBS HIGH 75: CPT | Mod: GC

## 2023-01-01 PROCEDURE — 84295 ASSAY OF SERUM SODIUM: CPT

## 2023-01-01 PROCEDURE — 83970 ASSAY OF PARATHORMONE: CPT

## 2023-01-01 PROCEDURE — 0225U NFCT DS DNA&RNA 21 SARSCOV2: CPT

## 2023-01-01 PROCEDURE — 83735 ASSAY OF MAGNESIUM: CPT

## 2023-01-01 PROCEDURE — 93010 ELECTROCARDIOGRAM REPORT: CPT | Mod: 76

## 2023-01-01 PROCEDURE — 83880 ASSAY OF NATRIURETIC PEPTIDE: CPT

## 2023-01-01 PROCEDURE — 99222 1ST HOSP IP/OBS MODERATE 55: CPT

## 2023-01-01 PROCEDURE — 87640 STAPH A DNA AMP PROBE: CPT

## 2023-01-01 PROCEDURE — 80048 BASIC METABOLIC PNL TOTAL CA: CPT

## 2023-01-01 PROCEDURE — 82962 GLUCOSE BLOOD TEST: CPT

## 2023-01-01 PROCEDURE — 99233 SBSQ HOSP IP/OBS HIGH 50: CPT | Mod: FS

## 2023-01-01 PROCEDURE — 82310 ASSAY OF CALCIUM: CPT

## 2023-01-01 PROCEDURE — 82803 BLOOD GASES ANY COMBINATION: CPT

## 2023-01-01 PROCEDURE — 99223 1ST HOSP IP/OBS HIGH 75: CPT

## 2023-01-01 PROCEDURE — 82306 VITAMIN D 25 HYDROXY: CPT

## 2023-01-01 PROCEDURE — 82652 VIT D 1 25-DIHYDROXY: CPT

## 2023-01-01 PROCEDURE — 81001 URINALYSIS AUTO W/SCOPE: CPT

## 2023-01-01 PROCEDURE — 83605 ASSAY OF LACTIC ACID: CPT

## 2023-01-01 PROCEDURE — 86900 BLOOD TYPING SEROLOGIC ABO: CPT

## 2023-01-01 PROCEDURE — 87086 URINE CULTURE/COLONY COUNT: CPT

## 2023-01-01 PROCEDURE — 36430 TRANSFUSION BLD/BLD COMPNT: CPT

## 2023-01-01 PROCEDURE — 82330 ASSAY OF CALCIUM: CPT

## 2023-01-01 PROCEDURE — 83519 RIA NONANTIBODY: CPT

## 2023-01-01 PROCEDURE — 82746 ASSAY OF FOLIC ACID SERUM: CPT

## 2023-01-01 PROCEDURE — 84484 ASSAY OF TROPONIN QUANT: CPT

## 2023-01-01 PROCEDURE — 93005 ELECTROCARDIOGRAM TRACING: CPT

## 2023-01-01 PROCEDURE — 86985 SPLIT BLOOD OR PRODUCTS: CPT

## 2023-01-01 PROCEDURE — 97166 OT EVAL MOD COMPLEX 45 MIN: CPT

## 2023-01-01 PROCEDURE — 80053 COMPREHEN METABOLIC PANEL: CPT

## 2023-01-01 PROCEDURE — 86923 COMPATIBILITY TEST ELECTRIC: CPT

## 2023-01-01 PROCEDURE — P9011: CPT

## 2023-01-01 PROCEDURE — 36415 COLL VENOUS BLD VENIPUNCTURE: CPT

## 2023-01-01 PROCEDURE — 97530 THERAPEUTIC ACTIVITIES: CPT

## 2023-01-01 PROCEDURE — 87641 MR-STAPH DNA AMP PROBE: CPT

## 2023-01-01 PROCEDURE — 84145 PROCALCITONIN (PCT): CPT

## 2023-01-01 PROCEDURE — 99291 CRITICAL CARE FIRST HOUR: CPT | Mod: FS,CS

## 2023-01-01 PROCEDURE — 85025 COMPLETE CBC W/AUTO DIFF WBC: CPT

## 2023-01-01 PROCEDURE — 99497 ADVNCD CARE PLAN 30 MIN: CPT | Mod: 25

## 2023-01-01 PROCEDURE — 85018 HEMOGLOBIN: CPT

## 2023-01-01 PROCEDURE — 84132 ASSAY OF SERUM POTASSIUM: CPT

## 2023-01-01 PROCEDURE — 82607 VITAMIN B-12: CPT

## 2023-01-01 PROCEDURE — 85045 AUTOMATED RETICULOCYTE COUNT: CPT

## 2023-01-01 PROCEDURE — 84560 ASSAY OF URINE/URIC ACID: CPT

## 2023-01-01 PROCEDURE — 84100 ASSAY OF PHOSPHORUS: CPT

## 2023-01-01 PROCEDURE — 86901 BLOOD TYPING SEROLOGIC RH(D): CPT

## 2023-01-01 PROCEDURE — 93010 ELECTROCARDIOGRAM REPORT: CPT

## 2023-01-01 PROCEDURE — 99285 EMERGENCY DEPT VISIT HI MDM: CPT

## 2023-01-01 PROCEDURE — 99292 CRITICAL CARE ADDL 30 MIN: CPT | Mod: FS,CS

## 2023-01-01 PROCEDURE — 85027 COMPLETE CBC AUTOMATED: CPT

## 2023-01-01 PROCEDURE — 97162 PT EVAL MOD COMPLEX 30 MIN: CPT

## 2023-01-01 PROCEDURE — U0003: CPT

## 2023-01-01 PROCEDURE — 82947 ASSAY GLUCOSE BLOOD QUANT: CPT

## 2023-01-01 PROCEDURE — 84550 ASSAY OF BLOOD/URIC ACID: CPT

## 2023-01-01 PROCEDURE — 99232 SBSQ HOSP IP/OBS MODERATE 35: CPT

## 2023-01-01 PROCEDURE — 86850 RBC ANTIBODY SCREEN: CPT

## 2023-01-01 RX ORDER — OXYCODONE HYDROCHLORIDE 5 MG/1
2.5 TABLET ORAL EVERY 4 HOURS
Refills: 0 | Status: DISCONTINUED | OUTPATIENT
Start: 2023-01-01 | End: 2023-01-01

## 2023-01-01 RX ORDER — MORPHINE SULFATE 50 MG/1
1 CAPSULE, EXTENDED RELEASE ORAL ONCE
Refills: 0 | Status: DISCONTINUED | OUTPATIENT
Start: 2023-01-01 | End: 2023-01-01

## 2023-01-01 RX ORDER — MIDODRINE HYDROCHLORIDE 2.5 MG/1
10 TABLET ORAL THREE TIMES A DAY
Refills: 0 | Status: DISCONTINUED | OUTPATIENT
Start: 2023-01-01 | End: 2023-01-01

## 2023-01-01 RX ORDER — ONDANSETRON 8 MG/1
4 TABLET, FILM COATED ORAL EVERY 8 HOURS
Refills: 0 | Status: DISCONTINUED | OUTPATIENT
Start: 2023-01-01 | End: 2023-01-01

## 2023-01-01 RX ORDER — ENOXAPARIN SODIUM 100 MG/ML
30 INJECTION SUBCUTANEOUS EVERY 24 HOURS
Refills: 0 | Status: DISCONTINUED | OUTPATIENT
Start: 2023-01-01 | End: 2023-01-01

## 2023-01-01 RX ORDER — MORPHINE SULFATE 50 MG/1
2.5 CAPSULE, EXTENDED RELEASE ORAL EVERY 4 HOURS
Refills: 0 | Status: DISCONTINUED | OUTPATIENT
Start: 2023-01-01 | End: 2023-01-01

## 2023-01-01 RX ORDER — MAGNESIUM SULFATE 500 MG/ML
2 VIAL (ML) INJECTION ONCE
Refills: 0 | Status: COMPLETED | OUTPATIENT
Start: 2023-01-01 | End: 2023-01-01

## 2023-01-01 RX ORDER — MORPHINE SULFATE 50 MG/1
2 CAPSULE, EXTENDED RELEASE ORAL ONCE
Refills: 0 | Status: DISCONTINUED | OUTPATIENT
Start: 2023-01-01 | End: 2023-01-01

## 2023-01-01 RX ORDER — IRON SUCROSE 20 MG/ML
200 INJECTION, SOLUTION INTRAVENOUS ONCE
Refills: 0 | Status: COMPLETED | OUTPATIENT
Start: 2023-01-01 | End: 2023-01-01

## 2023-01-01 RX ORDER — MIRTAZAPINE 45 MG/1
15 TABLET, ORALLY DISINTEGRATING ORAL AT BEDTIME
Refills: 0 | Status: DISCONTINUED | OUTPATIENT
Start: 2023-01-01 | End: 2023-01-01

## 2023-01-01 RX ORDER — MIDODRINE HYDROCHLORIDE 2.5 MG/1
5 TABLET ORAL ONCE
Refills: 0 | Status: COMPLETED | OUTPATIENT
Start: 2023-01-01 | End: 2023-01-01

## 2023-01-01 RX ORDER — CEFTRIAXONE 500 MG/1
1000 INJECTION, POWDER, FOR SOLUTION INTRAMUSCULAR; INTRAVENOUS EVERY 24 HOURS
Refills: 0 | Status: DISCONTINUED | OUTPATIENT
Start: 2023-01-01 | End: 2023-01-01

## 2023-01-01 RX ORDER — MIDODRINE HYDROCHLORIDE 2.5 MG/1
5 TABLET ORAL THREE TIMES A DAY
Refills: 0 | Status: DISCONTINUED | OUTPATIENT
Start: 2023-01-01 | End: 2023-01-01

## 2023-01-01 RX ORDER — POTASSIUM CHLORIDE 20 MEQ
40 PACKET (EA) ORAL ONCE
Refills: 0 | Status: COMPLETED | OUTPATIENT
Start: 2023-01-01 | End: 2023-01-01

## 2023-01-01 RX ORDER — AZITHROMYCIN 500 MG/1
500 TABLET, FILM COATED ORAL EVERY 24 HOURS
Refills: 0 | Status: DISCONTINUED | OUTPATIENT
Start: 2023-01-01 | End: 2023-01-01

## 2023-01-01 RX ORDER — NALOXONE HYDROCHLORIDE 4 MG/.1ML
0.1 SPRAY NASAL
Refills: 0 | Status: DISCONTINUED | OUTPATIENT
Start: 2023-01-01 | End: 2023-01-01

## 2023-01-01 RX ORDER — DEXTROSE 50 % IN WATER 50 %
25 SYRINGE (ML) INTRAVENOUS ONCE
Refills: 0 | Status: DISCONTINUED | OUTPATIENT
Start: 2023-01-01 | End: 2023-01-01

## 2023-01-01 RX ORDER — MORPHINE SULFATE 50 MG/1
5 CAPSULE, EXTENDED RELEASE ORAL EVERY 4 HOURS
Refills: 0 | Status: DISCONTINUED | OUTPATIENT
Start: 2023-01-01 | End: 2023-01-01

## 2023-01-01 RX ORDER — SENNA PLUS 8.6 MG/1
2 TABLET ORAL AT BEDTIME
Refills: 0 | Status: DISCONTINUED | OUTPATIENT
Start: 2023-01-01 | End: 2023-01-01

## 2023-01-01 RX ORDER — MORPHINE SULFATE 50 MG/1
2.5 CAPSULE, EXTENDED RELEASE ORAL
Refills: 0 | Status: DISCONTINUED | OUTPATIENT
Start: 2023-01-01 | End: 2023-01-01

## 2023-01-01 RX ORDER — DEXTROSE 50 % IN WATER 50 %
50 SYRINGE (ML) INTRAVENOUS ONCE
Refills: 0 | Status: COMPLETED | OUTPATIENT
Start: 2023-01-01 | End: 2023-01-01

## 2023-01-01 RX ORDER — SODIUM CHLORIDE 9 MG/ML
1000 INJECTION, SOLUTION INTRAVENOUS
Refills: 0 | Status: DISCONTINUED | OUTPATIENT
Start: 2023-01-01 | End: 2023-01-01

## 2023-01-01 RX ORDER — MORPHINE SULFATE 50 MG/1
3 CAPSULE, EXTENDED RELEASE ORAL EVERY 4 HOURS
Refills: 0 | Status: DISCONTINUED | OUTPATIENT
Start: 2023-01-01 | End: 2023-01-01

## 2023-01-01 RX ORDER — LABETALOL HCL 100 MG
100 TABLET ORAL ONCE
Refills: 0 | Status: DISCONTINUED | OUTPATIENT
Start: 2023-01-01 | End: 2023-01-01

## 2023-01-01 RX ORDER — DEXTROSE 50 % IN WATER 50 %
15 SYRINGE (ML) INTRAVENOUS ONCE
Refills: 0 | Status: COMPLETED | OUTPATIENT
Start: 2023-01-01 | End: 2023-01-01

## 2023-01-01 RX ORDER — ALBUMIN HUMAN 25 %
250 VIAL (ML) INTRAVENOUS ONCE
Refills: 0 | Status: COMPLETED | OUTPATIENT
Start: 2023-01-01 | End: 2023-01-01

## 2023-01-01 RX ORDER — LANOLIN ALCOHOL/MO/W.PET/CERES
3 CREAM (GRAM) TOPICAL AT BEDTIME
Refills: 0 | Status: DISCONTINUED | OUTPATIENT
Start: 2023-01-01 | End: 2023-01-01

## 2023-01-01 RX ORDER — ACETAMINOPHEN 500 MG
500 TABLET ORAL ONCE
Refills: 0 | Status: DISCONTINUED | OUTPATIENT
Start: 2023-01-01 | End: 2023-01-01

## 2023-01-01 RX ORDER — POTASSIUM CHLORIDE 20 MEQ
10 PACKET (EA) ORAL
Refills: 0 | Status: COMPLETED | OUTPATIENT
Start: 2023-01-01 | End: 2023-01-01

## 2023-01-01 RX ORDER — SODIUM CHLORIDE 9 MG/ML
500 INJECTION INTRAMUSCULAR; INTRAVENOUS; SUBCUTANEOUS ONCE
Refills: 0 | Status: COMPLETED | OUTPATIENT
Start: 2023-01-01 | End: 2023-01-01

## 2023-01-01 RX ORDER — FUROSEMIDE 40 MG
20 TABLET ORAL
Refills: 0 | Status: COMPLETED | OUTPATIENT
Start: 2023-01-01 | End: 2023-01-01

## 2023-01-01 RX ORDER — MORPHINE SULFATE 50 MG/1
10 CAPSULE, EXTENDED RELEASE ORAL EVERY 4 HOURS
Refills: 0 | Status: DISCONTINUED | OUTPATIENT
Start: 2023-01-01 | End: 2023-01-01

## 2023-01-01 RX ORDER — CALCITONIN SALMON 200 [IU]/ML
140 INJECTION, SOLUTION INTRAMUSCULAR EVERY 12 HOURS
Refills: 0 | Status: COMPLETED | OUTPATIENT
Start: 2023-01-01 | End: 2023-01-01

## 2023-01-01 RX ORDER — DEXTROSE 50 % IN WATER 50 %
12.5 SYRINGE (ML) INTRAVENOUS ONCE
Refills: 0 | Status: DISCONTINUED | OUTPATIENT
Start: 2023-01-01 | End: 2023-01-01

## 2023-01-01 RX ORDER — OXYCODONE HYDROCHLORIDE 5 MG/1
5 TABLET ORAL EVERY 4 HOURS
Refills: 0 | Status: DISCONTINUED | OUTPATIENT
Start: 2023-01-01 | End: 2023-01-01

## 2023-01-01 RX ORDER — ZOLEDRONIC ACID 5 MG/100ML
4 INJECTION, SOLUTION INTRAVENOUS ONCE
Refills: 0 | Status: COMPLETED | OUTPATIENT
Start: 2023-01-01 | End: 2023-01-01

## 2023-01-01 RX ORDER — SODIUM CHLORIDE 9 MG/ML
500 INJECTION, SOLUTION INTRAVENOUS ONCE
Refills: 0 | Status: COMPLETED | OUTPATIENT
Start: 2023-01-01 | End: 2023-01-01

## 2023-01-01 RX ORDER — POLYETHYLENE GLYCOL 3350 17 G/17G
17 POWDER, FOR SOLUTION ORAL DAILY
Refills: 0 | Status: DISCONTINUED | OUTPATIENT
Start: 2023-01-01 | End: 2023-01-01

## 2023-01-01 RX ORDER — NALOXONE HYDROCHLORIDE 4 MG/.1ML
0.4 SPRAY NASAL ONCE
Refills: 0 | Status: DISCONTINUED | OUTPATIENT
Start: 2023-01-01 | End: 2023-01-01

## 2023-01-01 RX ORDER — CHLORHEXIDINE GLUCONATE 213 G/1000ML
1 SOLUTION TOPICAL
Refills: 0 | Status: DISCONTINUED | OUTPATIENT
Start: 2023-01-01 | End: 2023-01-01

## 2023-01-01 RX ORDER — ACETAMINOPHEN 500 MG
650 TABLET ORAL EVERY 6 HOURS
Refills: 0 | Status: DISCONTINUED | OUTPATIENT
Start: 2023-01-01 | End: 2023-01-01

## 2023-01-01 RX ORDER — GLUCAGON INJECTION, SOLUTION 0.5 MG/.1ML
1 INJECTION, SOLUTION SUBCUTANEOUS ONCE
Refills: 0 | Status: DISCONTINUED | OUTPATIENT
Start: 2023-01-01 | End: 2023-01-01

## 2023-01-01 RX ORDER — SODIUM CHLORIDE 9 MG/ML
250 INJECTION INTRAMUSCULAR; INTRAVENOUS; SUBCUTANEOUS ONCE
Refills: 0 | Status: COMPLETED | OUTPATIENT
Start: 2023-01-01 | End: 2023-01-01

## 2023-01-01 RX ORDER — ERGOCALCIFEROL 1.25 MG/1
50000 CAPSULE ORAL
Refills: 0 | Status: DISCONTINUED | OUTPATIENT
Start: 2023-01-01 | End: 2023-01-01

## 2023-01-01 RX ORDER — ACETAMINOPHEN 500 MG
650 TABLET ORAL ONCE
Refills: 0 | Status: DISCONTINUED | OUTPATIENT
Start: 2023-01-01 | End: 2023-01-01

## 2023-01-01 RX ORDER — MORPHINE SULFATE 50 MG/1
3 CAPSULE, EXTENDED RELEASE ORAL
Refills: 0 | Status: DISCONTINUED | OUTPATIENT
Start: 2023-01-01 | End: 2023-01-01

## 2023-01-01 RX ORDER — DEXTROSE 50 % IN WATER 50 %
15 SYRINGE (ML) INTRAVENOUS ONCE
Refills: 0 | Status: DISCONTINUED | OUTPATIENT
Start: 2023-01-01 | End: 2023-01-01

## 2023-01-01 RX ORDER — ACETAMINOPHEN 500 MG
550 TABLET ORAL ONCE
Refills: 0 | Status: COMPLETED | OUTPATIENT
Start: 2023-01-01 | End: 2023-01-01

## 2023-01-01 RX ORDER — ACETAMINOPHEN 500 MG
325 TABLET ORAL EVERY 6 HOURS
Refills: 0 | Status: DISCONTINUED | OUTPATIENT
Start: 2023-01-01 | End: 2023-01-01

## 2023-01-01 RX ORDER — CHOLECALCIFEROL (VITAMIN D3) 125 MCG
50000 CAPSULE ORAL
Refills: 0 | Status: DISCONTINUED | OUTPATIENT
Start: 2023-01-01 | End: 2023-01-01

## 2023-01-01 RX ORDER — CHLORHEXIDINE GLUCONATE 213 G/1000ML
1 SOLUTION TOPICAL DAILY
Refills: 0 | Status: DISCONTINUED | OUTPATIENT
Start: 2023-01-01 | End: 2023-01-01

## 2023-01-01 RX ORDER — MORPHINE SULFATE 50 MG/1
5 CAPSULE, EXTENDED RELEASE ORAL ONCE
Refills: 0 | Status: DISCONTINUED | OUTPATIENT
Start: 2023-01-01 | End: 2023-01-01

## 2023-01-01 RX ORDER — SODIUM CHLORIDE 9 MG/ML
1000 INJECTION INTRAMUSCULAR; INTRAVENOUS; SUBCUTANEOUS
Refills: 0 | Status: DISCONTINUED | OUTPATIENT
Start: 2023-01-01 | End: 2023-01-01

## 2023-01-01 RX ORDER — MORPHINE SULFATE 50 MG/1
2 CAPSULE, EXTENDED RELEASE ORAL EVERY 4 HOURS
Refills: 0 | Status: DISCONTINUED | OUTPATIENT
Start: 2023-01-01 | End: 2023-01-01

## 2023-01-01 RX ORDER — ENOXAPARIN SODIUM 100 MG/ML
40 INJECTION SUBCUTANEOUS EVERY 24 HOURS
Refills: 0 | Status: DISCONTINUED | OUTPATIENT
Start: 2023-01-01 | End: 2023-01-01

## 2023-01-01 RX ORDER — ROBINUL 0.2 MG/ML
0.4 INJECTION INTRAMUSCULAR; INTRAVENOUS EVERY 6 HOURS
Refills: 0 | Status: DISCONTINUED | OUTPATIENT
Start: 2023-01-01 | End: 2023-01-01

## 2023-01-01 RX ORDER — CEFTRIAXONE 500 MG/1
1000 INJECTION, POWDER, FOR SOLUTION INTRAMUSCULAR; INTRAVENOUS EVERY 24 HOURS
Refills: 0 | Status: COMPLETED | OUTPATIENT
Start: 2023-01-01 | End: 2023-01-01

## 2023-01-01 RX ADMIN — MORPHINE SULFATE 5 MILLIGRAM(S): 50 CAPSULE, EXTENDED RELEASE ORAL at 08:14

## 2023-01-01 RX ADMIN — Medication 15 GRAM(S): at 19:50

## 2023-01-01 RX ADMIN — MORPHINE SULFATE 5 MILLIGRAM(S): 50 CAPSULE, EXTENDED RELEASE ORAL at 16:00

## 2023-01-01 RX ADMIN — MORPHINE SULFATE 5 MILLIGRAM(S): 50 CAPSULE, EXTENDED RELEASE ORAL at 19:58

## 2023-01-01 RX ADMIN — ENOXAPARIN SODIUM 40 MILLIGRAM(S): 100 INJECTION SUBCUTANEOUS at 12:13

## 2023-01-01 RX ADMIN — MORPHINE SULFATE 5 MILLIGRAM(S): 50 CAPSULE, EXTENDED RELEASE ORAL at 11:20

## 2023-01-01 RX ADMIN — CHLORHEXIDINE GLUCONATE 1 APPLICATION(S): 213 SOLUTION TOPICAL at 05:58

## 2023-01-01 RX ADMIN — MORPHINE SULFATE 5 MILLIGRAM(S): 50 CAPSULE, EXTENDED RELEASE ORAL at 11:58

## 2023-01-01 RX ADMIN — SENNA PLUS 2 TABLET(S): 8.6 TABLET ORAL at 21:41

## 2023-01-01 RX ADMIN — MORPHINE SULFATE 5 MILLIGRAM(S): 50 CAPSULE, EXTENDED RELEASE ORAL at 08:50

## 2023-01-01 RX ADMIN — ENOXAPARIN SODIUM 30 MILLIGRAM(S): 100 INJECTION SUBCUTANEOUS at 17:01

## 2023-01-01 RX ADMIN — SODIUM CHLORIDE 40 MILLILITER(S): 9 INJECTION, SOLUTION INTRAVENOUS at 21:34

## 2023-01-01 RX ADMIN — ENOXAPARIN SODIUM 40 MILLIGRAM(S): 100 INJECTION SUBCUTANEOUS at 20:56

## 2023-01-01 RX ADMIN — MORPHINE SULFATE 3 MILLIGRAM(S): 50 CAPSULE, EXTENDED RELEASE ORAL at 16:50

## 2023-01-01 RX ADMIN — Medication 125 MILLILITER(S): at 10:25

## 2023-01-01 RX ADMIN — MORPHINE SULFATE 5 MILLIGRAM(S): 50 CAPSULE, EXTENDED RELEASE ORAL at 21:49

## 2023-01-01 RX ADMIN — MIDODRINE HYDROCHLORIDE 5 MILLIGRAM(S): 2.5 TABLET ORAL at 05:59

## 2023-01-01 RX ADMIN — Medication 30 MILLILITER(S): at 15:25

## 2023-01-01 RX ADMIN — Medication 325 MILLIGRAM(S): at 16:59

## 2023-01-01 RX ADMIN — MORPHINE SULFATE 5 MILLIGRAM(S): 50 CAPSULE, EXTENDED RELEASE ORAL at 09:51

## 2023-01-01 RX ADMIN — Medication 325 MILLIGRAM(S): at 14:20

## 2023-01-01 RX ADMIN — SODIUM CHLORIDE 120 MILLILITER(S): 9 INJECTION, SOLUTION INTRAVENOUS at 02:37

## 2023-01-01 RX ADMIN — MORPHINE SULFATE 5 MILLIGRAM(S): 50 CAPSULE, EXTENDED RELEASE ORAL at 07:15

## 2023-01-01 RX ADMIN — MORPHINE SULFATE 3 MILLIGRAM(S): 50 CAPSULE, EXTENDED RELEASE ORAL at 03:15

## 2023-01-01 RX ADMIN — MORPHINE SULFATE 5 MILLIGRAM(S): 50 CAPSULE, EXTENDED RELEASE ORAL at 17:15

## 2023-01-01 RX ADMIN — CEFTRIAXONE 100 MILLIGRAM(S): 500 INJECTION, POWDER, FOR SOLUTION INTRAMUSCULAR; INTRAVENOUS at 05:31

## 2023-01-01 RX ADMIN — MORPHINE SULFATE 3 MILLIGRAM(S): 50 CAPSULE, EXTENDED RELEASE ORAL at 09:36

## 2023-01-01 RX ADMIN — MIDODRINE HYDROCHLORIDE 5 MILLIGRAM(S): 2.5 TABLET ORAL at 05:36

## 2023-01-01 RX ADMIN — MORPHINE SULFATE 5 MILLIGRAM(S): 50 CAPSULE, EXTENDED RELEASE ORAL at 09:48

## 2023-01-01 RX ADMIN — MORPHINE SULFATE 5 MILLIGRAM(S): 50 CAPSULE, EXTENDED RELEASE ORAL at 03:39

## 2023-01-01 RX ADMIN — MORPHINE SULFATE 5 MILLIGRAM(S): 50 CAPSULE, EXTENDED RELEASE ORAL at 22:45

## 2023-01-01 RX ADMIN — Medication 325 MILLIGRAM(S): at 12:45

## 2023-01-01 RX ADMIN — MORPHINE SULFATE 3 MILLIGRAM(S): 50 CAPSULE, EXTENDED RELEASE ORAL at 17:20

## 2023-01-01 RX ADMIN — Medication 100 MILLIEQUIVALENT(S): at 13:04

## 2023-01-01 RX ADMIN — MORPHINE SULFATE 5 MILLIGRAM(S): 50 CAPSULE, EXTENDED RELEASE ORAL at 18:20

## 2023-01-01 RX ADMIN — MORPHINE SULFATE 5 MILLIGRAM(S): 50 CAPSULE, EXTENDED RELEASE ORAL at 07:25

## 2023-01-01 RX ADMIN — SODIUM CHLORIDE 200 MILLILITER(S): 9 INJECTION INTRAMUSCULAR; INTRAVENOUS; SUBCUTANEOUS at 01:21

## 2023-01-01 RX ADMIN — Medication 325 MILLIGRAM(S): at 05:10

## 2023-01-01 RX ADMIN — MORPHINE SULFATE 5 MILLIGRAM(S): 50 CAPSULE, EXTENDED RELEASE ORAL at 00:55

## 2023-01-01 RX ADMIN — MORPHINE SULFATE 3 MILLIGRAM(S): 50 CAPSULE, EXTENDED RELEASE ORAL at 19:13

## 2023-01-01 RX ADMIN — MORPHINE SULFATE 2.5 MILLIGRAM(S): 50 CAPSULE, EXTENDED RELEASE ORAL at 11:27

## 2023-01-01 RX ADMIN — MORPHINE SULFATE 5 MILLIGRAM(S): 50 CAPSULE, EXTENDED RELEASE ORAL at 21:36

## 2023-01-01 RX ADMIN — Medication 325 MILLIGRAM(S): at 19:20

## 2023-01-01 RX ADMIN — MIDODRINE HYDROCHLORIDE 5 MILLIGRAM(S): 2.5 TABLET ORAL at 18:29

## 2023-01-01 RX ADMIN — MORPHINE SULFATE 5 MILLIGRAM(S): 50 CAPSULE, EXTENDED RELEASE ORAL at 20:33

## 2023-01-01 RX ADMIN — IRON SUCROSE 110 MILLIGRAM(S): 20 INJECTION, SOLUTION INTRAVENOUS at 14:34

## 2023-01-01 RX ADMIN — Medication 100 MILLIEQUIVALENT(S): at 20:12

## 2023-01-01 RX ADMIN — CEFTRIAXONE 100 MILLIGRAM(S): 500 INJECTION, POWDER, FOR SOLUTION INTRAMUSCULAR; INTRAVENOUS at 03:08

## 2023-01-01 RX ADMIN — SODIUM CHLORIDE 60 MILLILITER(S): 9 INJECTION, SOLUTION INTRAVENOUS at 12:08

## 2023-01-01 RX ADMIN — SODIUM CHLORIDE 100 MILLILITER(S): 9 INJECTION, SOLUTION INTRAVENOUS at 21:53

## 2023-01-01 RX ADMIN — MORPHINE SULFATE 5 MILLIGRAM(S): 50 CAPSULE, EXTENDED RELEASE ORAL at 15:19

## 2023-01-01 RX ADMIN — MORPHINE SULFATE 1 MILLIGRAM(S): 50 CAPSULE, EXTENDED RELEASE ORAL at 00:40

## 2023-01-01 RX ADMIN — Medication 325 MILLIGRAM(S): at 23:08

## 2023-01-01 RX ADMIN — MORPHINE SULFATE 5 MILLIGRAM(S): 50 CAPSULE, EXTENDED RELEASE ORAL at 01:50

## 2023-01-01 RX ADMIN — SODIUM CHLORIDE 100 MILLILITER(S): 9 INJECTION, SOLUTION INTRAVENOUS at 06:58

## 2023-01-01 RX ADMIN — Medication 325 MILLIGRAM(S): at 17:45

## 2023-01-01 RX ADMIN — Medication 325 MILLIGRAM(S): at 17:20

## 2023-01-01 RX ADMIN — MIDODRINE HYDROCHLORIDE 5 MILLIGRAM(S): 2.5 TABLET ORAL at 10:09

## 2023-01-01 RX ADMIN — Medication 40 MILLIEQUIVALENT(S): at 16:14

## 2023-01-01 RX ADMIN — MORPHINE SULFATE 5 MILLIGRAM(S): 50 CAPSULE, EXTENDED RELEASE ORAL at 23:05

## 2023-01-01 RX ADMIN — MORPHINE SULFATE 2.5 MILLIGRAM(S): 50 CAPSULE, EXTENDED RELEASE ORAL at 11:50

## 2023-01-01 RX ADMIN — MORPHINE SULFATE 5 MILLIGRAM(S): 50 CAPSULE, EXTENDED RELEASE ORAL at 02:44

## 2023-01-01 RX ADMIN — MORPHINE SULFATE 5 MILLIGRAM(S): 50 CAPSULE, EXTENDED RELEASE ORAL at 14:58

## 2023-01-01 RX ADMIN — SODIUM CHLORIDE 120 MILLILITER(S): 9 INJECTION, SOLUTION INTRAVENOUS at 09:49

## 2023-01-01 RX ADMIN — MORPHINE SULFATE 5 MILLIGRAM(S): 50 CAPSULE, EXTENDED RELEASE ORAL at 08:30

## 2023-01-01 RX ADMIN — MORPHINE SULFATE 3 MILLIGRAM(S): 50 CAPSULE, EXTENDED RELEASE ORAL at 21:30

## 2023-01-01 RX ADMIN — MORPHINE SULFATE 5 MILLIGRAM(S): 50 CAPSULE, EXTENDED RELEASE ORAL at 11:00

## 2023-01-01 RX ADMIN — MORPHINE SULFATE 3 MILLIGRAM(S): 50 CAPSULE, EXTENDED RELEASE ORAL at 10:06

## 2023-01-01 RX ADMIN — MORPHINE SULFATE 5 MILLIGRAM(S): 50 CAPSULE, EXTENDED RELEASE ORAL at 13:08

## 2023-01-01 RX ADMIN — Medication 325 MILLIGRAM(S): at 10:00

## 2023-01-01 RX ADMIN — MORPHINE SULFATE 5 MILLIGRAM(S): 50 CAPSULE, EXTENDED RELEASE ORAL at 14:49

## 2023-01-01 RX ADMIN — MORPHINE SULFATE 5 MILLIGRAM(S): 50 CAPSULE, EXTENDED RELEASE ORAL at 14:27

## 2023-01-01 RX ADMIN — ENOXAPARIN SODIUM 40 MILLIGRAM(S): 100 INJECTION SUBCUTANEOUS at 22:44

## 2023-01-01 RX ADMIN — MORPHINE SULFATE 5 MILLIGRAM(S): 50 CAPSULE, EXTENDED RELEASE ORAL at 15:15

## 2023-01-01 RX ADMIN — MORPHINE SULFATE 5 MILLIGRAM(S): 50 CAPSULE, EXTENDED RELEASE ORAL at 15:26

## 2023-01-01 RX ADMIN — CHLORHEXIDINE GLUCONATE 1 APPLICATION(S): 213 SOLUTION TOPICAL at 09:48

## 2023-01-01 RX ADMIN — ZOLEDRONIC ACID 420 MILLIGRAM(S): 5 INJECTION, SOLUTION INTRAVENOUS at 00:02

## 2023-01-01 RX ADMIN — SODIUM CHLORIDE 100 MILLILITER(S): 9 INJECTION, SOLUTION INTRAVENOUS at 14:39

## 2023-01-01 RX ADMIN — SODIUM CHLORIDE 120 MILLILITER(S): 9 INJECTION, SOLUTION INTRAVENOUS at 13:54

## 2023-01-01 RX ADMIN — MORPHINE SULFATE 5 MILLIGRAM(S): 50 CAPSULE, EXTENDED RELEASE ORAL at 15:29

## 2023-01-01 RX ADMIN — MORPHINE SULFATE 5 MILLIGRAM(S): 50 CAPSULE, EXTENDED RELEASE ORAL at 10:15

## 2023-01-01 RX ADMIN — CALCITONIN SALMON 140 INTERNATIONAL UNIT(S): 200 INJECTION, SOLUTION INTRAMUSCULAR at 23:09

## 2023-01-01 RX ADMIN — Medication 325 MILLIGRAM(S): at 18:38

## 2023-01-01 RX ADMIN — MORPHINE SULFATE 5 MILLIGRAM(S): 50 CAPSULE, EXTENDED RELEASE ORAL at 09:54

## 2023-01-01 RX ADMIN — ENOXAPARIN SODIUM 40 MILLIGRAM(S): 100 INJECTION SUBCUTANEOUS at 23:22

## 2023-01-01 RX ADMIN — MORPHINE SULFATE 5 MILLIGRAM(S): 50 CAPSULE, EXTENDED RELEASE ORAL at 02:30

## 2023-01-01 RX ADMIN — MORPHINE SULFATE 2.5 MILLIGRAM(S): 50 CAPSULE, EXTENDED RELEASE ORAL at 11:00

## 2023-01-01 RX ADMIN — MORPHINE SULFATE 5 MILLIGRAM(S): 50 CAPSULE, EXTENDED RELEASE ORAL at 02:04

## 2023-01-01 RX ADMIN — MIDODRINE HYDROCHLORIDE 10 MILLIGRAM(S): 2.5 TABLET ORAL at 17:01

## 2023-01-01 RX ADMIN — SODIUM CHLORIDE 60 MILLILITER(S): 9 INJECTION, SOLUTION INTRAVENOUS at 21:02

## 2023-01-01 RX ADMIN — ENOXAPARIN SODIUM 30 MILLIGRAM(S): 100 INJECTION SUBCUTANEOUS at 14:55

## 2023-01-01 RX ADMIN — MORPHINE SULFATE 5 MILLIGRAM(S): 50 CAPSULE, EXTENDED RELEASE ORAL at 06:32

## 2023-01-01 RX ADMIN — MORPHINE SULFATE 3 MILLIGRAM(S): 50 CAPSULE, EXTENDED RELEASE ORAL at 02:57

## 2023-01-01 RX ADMIN — MORPHINE SULFATE 5 MILLIGRAM(S): 50 CAPSULE, EXTENDED RELEASE ORAL at 06:22

## 2023-01-01 RX ADMIN — MORPHINE SULFATE 5 MILLIGRAM(S): 50 CAPSULE, EXTENDED RELEASE ORAL at 23:57

## 2023-01-01 RX ADMIN — Medication 100 MILLIEQUIVALENT(S): at 14:26

## 2023-01-01 RX ADMIN — MORPHINE SULFATE 5 MILLIGRAM(S): 50 CAPSULE, EXTENDED RELEASE ORAL at 03:15

## 2023-01-01 RX ADMIN — CEFTRIAXONE 100 MILLIGRAM(S): 500 INJECTION, POWDER, FOR SOLUTION INTRAMUSCULAR; INTRAVENOUS at 05:53

## 2023-01-01 RX ADMIN — MIDODRINE HYDROCHLORIDE 5 MILLIGRAM(S): 2.5 TABLET ORAL at 12:15

## 2023-01-01 RX ADMIN — CEFTRIAXONE 100 MILLIGRAM(S): 500 INJECTION, POWDER, FOR SOLUTION INTRAMUSCULAR; INTRAVENOUS at 04:59

## 2023-01-01 RX ADMIN — MORPHINE SULFATE 5 MILLIGRAM(S): 50 CAPSULE, EXTENDED RELEASE ORAL at 06:56

## 2023-01-01 RX ADMIN — MORPHINE SULFATE 5 MILLIGRAM(S): 50 CAPSULE, EXTENDED RELEASE ORAL at 06:13

## 2023-01-01 RX ADMIN — MORPHINE SULFATE 5 MILLIGRAM(S): 50 CAPSULE, EXTENDED RELEASE ORAL at 17:38

## 2023-01-01 RX ADMIN — AZITHROMYCIN 255 MILLIGRAM(S): 500 TABLET, FILM COATED ORAL at 21:48

## 2023-01-01 RX ADMIN — MORPHINE SULFATE 2.5 MILLIGRAM(S): 50 CAPSULE, EXTENDED RELEASE ORAL at 10:40

## 2023-01-01 RX ADMIN — Medication 325 MILLIGRAM(S): at 13:45

## 2023-01-01 RX ADMIN — MORPHINE SULFATE 5 MILLIGRAM(S): 50 CAPSULE, EXTENDED RELEASE ORAL at 08:44

## 2023-01-01 RX ADMIN — MIRTAZAPINE 15 MILLIGRAM(S): 45 TABLET, ORALLY DISINTEGRATING ORAL at 22:45

## 2023-01-01 RX ADMIN — Medication 325 MILLIGRAM(S): at 23:42

## 2023-01-01 RX ADMIN — MORPHINE SULFATE 5 MILLIGRAM(S): 50 CAPSULE, EXTENDED RELEASE ORAL at 18:28

## 2023-01-01 RX ADMIN — CHLORHEXIDINE GLUCONATE 1 APPLICATION(S): 213 SOLUTION TOPICAL at 07:31

## 2023-01-01 RX ADMIN — MORPHINE SULFATE 5 MILLIGRAM(S): 50 CAPSULE, EXTENDED RELEASE ORAL at 23:15

## 2023-01-01 RX ADMIN — MORPHINE SULFATE 5 MILLIGRAM(S): 50 CAPSULE, EXTENDED RELEASE ORAL at 12:33

## 2023-01-01 RX ADMIN — SODIUM CHLORIDE 40 MILLILITER(S): 9 INJECTION, SOLUTION INTRAVENOUS at 00:39

## 2023-01-01 RX ADMIN — MORPHINE SULFATE 5 MILLIGRAM(S): 50 CAPSULE, EXTENDED RELEASE ORAL at 00:24

## 2023-01-01 RX ADMIN — CHLORHEXIDINE GLUCONATE 1 APPLICATION(S): 213 SOLUTION TOPICAL at 06:22

## 2023-01-01 RX ADMIN — MIDODRINE HYDROCHLORIDE 5 MILLIGRAM(S): 2.5 TABLET ORAL at 06:55

## 2023-01-01 RX ADMIN — MORPHINE SULFATE 5 MILLIGRAM(S): 50 CAPSULE, EXTENDED RELEASE ORAL at 11:52

## 2023-01-01 RX ADMIN — MIDODRINE HYDROCHLORIDE 5 MILLIGRAM(S): 2.5 TABLET ORAL at 05:55

## 2023-01-01 RX ADMIN — MORPHINE SULFATE 5 MILLIGRAM(S): 50 CAPSULE, EXTENDED RELEASE ORAL at 11:57

## 2023-01-01 RX ADMIN — CEFTRIAXONE 100 MILLIGRAM(S): 500 INJECTION, POWDER, FOR SOLUTION INTRAMUSCULAR; INTRAVENOUS at 04:23

## 2023-01-01 RX ADMIN — CHLORHEXIDINE GLUCONATE 1 APPLICATION(S): 213 SOLUTION TOPICAL at 06:53

## 2023-01-01 RX ADMIN — MORPHINE SULFATE 3 MILLIGRAM(S): 50 CAPSULE, EXTENDED RELEASE ORAL at 09:00

## 2023-01-01 RX ADMIN — MORPHINE SULFATE 3 MILLIGRAM(S): 50 CAPSULE, EXTENDED RELEASE ORAL at 08:05

## 2023-01-01 RX ADMIN — MORPHINE SULFATE 5 MILLIGRAM(S): 50 CAPSULE, EXTENDED RELEASE ORAL at 05:59

## 2023-01-01 RX ADMIN — MORPHINE SULFATE 5 MILLIGRAM(S): 50 CAPSULE, EXTENDED RELEASE ORAL at 18:15

## 2023-01-01 RX ADMIN — Medication 325 MILLIGRAM(S): at 14:10

## 2023-01-01 RX ADMIN — MORPHINE SULFATE 5 MILLIGRAM(S): 50 CAPSULE, EXTENDED RELEASE ORAL at 17:22

## 2023-01-01 RX ADMIN — MIDODRINE HYDROCHLORIDE 10 MILLIGRAM(S): 2.5 TABLET ORAL at 14:26

## 2023-01-01 RX ADMIN — MIDODRINE HYDROCHLORIDE 5 MILLIGRAM(S): 2.5 TABLET ORAL at 11:50

## 2023-01-01 RX ADMIN — CALCITONIN SALMON 140 INTERNATIONAL UNIT(S): 200 INJECTION, SOLUTION INTRAMUSCULAR at 16:57

## 2023-01-01 RX ADMIN — MORPHINE SULFATE 5 MILLIGRAM(S): 50 CAPSULE, EXTENDED RELEASE ORAL at 19:03

## 2023-01-01 RX ADMIN — MORPHINE SULFATE 3 MILLIGRAM(S): 50 CAPSULE, EXTENDED RELEASE ORAL at 01:31

## 2023-01-01 RX ADMIN — MIDODRINE HYDROCHLORIDE 5 MILLIGRAM(S): 2.5 TABLET ORAL at 21:49

## 2023-01-01 RX ADMIN — MORPHINE SULFATE 5 MILLIGRAM(S): 50 CAPSULE, EXTENDED RELEASE ORAL at 02:14

## 2023-01-01 RX ADMIN — MORPHINE SULFATE 5 MILLIGRAM(S): 50 CAPSULE, EXTENDED RELEASE ORAL at 12:50

## 2023-01-01 RX ADMIN — SODIUM CHLORIDE 1000 MILLILITER(S): 9 INJECTION INTRAMUSCULAR; INTRAVENOUS; SUBCUTANEOUS at 14:00

## 2023-01-01 RX ADMIN — MORPHINE SULFATE 5 MILLIGRAM(S): 50 CAPSULE, EXTENDED RELEASE ORAL at 07:48

## 2023-01-01 RX ADMIN — MORPHINE SULFATE 5 MILLIGRAM(S): 50 CAPSULE, EXTENDED RELEASE ORAL at 11:12

## 2023-01-01 RX ADMIN — CHLORHEXIDINE GLUCONATE 1 APPLICATION(S): 213 SOLUTION TOPICAL at 05:59

## 2023-01-01 RX ADMIN — MORPHINE SULFATE 5 MILLIGRAM(S): 50 CAPSULE, EXTENDED RELEASE ORAL at 06:59

## 2023-01-01 RX ADMIN — MORPHINE SULFATE 5 MILLIGRAM(S): 50 CAPSULE, EXTENDED RELEASE ORAL at 10:42

## 2023-01-01 RX ADMIN — MIDODRINE HYDROCHLORIDE 5 MILLIGRAM(S): 2.5 TABLET ORAL at 13:11

## 2023-01-01 RX ADMIN — MORPHINE SULFATE 3 MILLIGRAM(S): 50 CAPSULE, EXTENDED RELEASE ORAL at 03:30

## 2023-01-01 RX ADMIN — Medication 15 GRAM(S): at 10:09

## 2023-01-01 RX ADMIN — MIDODRINE HYDROCHLORIDE 5 MILLIGRAM(S): 2.5 TABLET ORAL at 21:15

## 2023-01-01 RX ADMIN — MIDODRINE HYDROCHLORIDE 5 MILLIGRAM(S): 2.5 TABLET ORAL at 03:55

## 2023-01-01 RX ADMIN — MORPHINE SULFATE 5 MILLIGRAM(S): 50 CAPSULE, EXTENDED RELEASE ORAL at 03:43

## 2023-01-01 RX ADMIN — SODIUM CHLORIDE 40 MILLILITER(S): 9 INJECTION, SOLUTION INTRAVENOUS at 16:46

## 2023-01-01 RX ADMIN — MORPHINE SULFATE 5 MILLIGRAM(S): 50 CAPSULE, EXTENDED RELEASE ORAL at 12:20

## 2023-01-01 RX ADMIN — MORPHINE SULFATE 5 MILLIGRAM(S): 50 CAPSULE, EXTENDED RELEASE ORAL at 13:00

## 2023-01-01 RX ADMIN — MIDODRINE HYDROCHLORIDE 5 MILLIGRAM(S): 2.5 TABLET ORAL at 05:14

## 2023-01-01 RX ADMIN — ENOXAPARIN SODIUM 40 MILLIGRAM(S): 100 INJECTION SUBCUTANEOUS at 21:41

## 2023-01-01 RX ADMIN — MORPHINE SULFATE 5 MILLIGRAM(S): 50 CAPSULE, EXTENDED RELEASE ORAL at 18:45

## 2023-01-01 RX ADMIN — ENOXAPARIN SODIUM 40 MILLIGRAM(S): 100 INJECTION SUBCUTANEOUS at 22:36

## 2023-01-01 RX ADMIN — SODIUM CHLORIDE 200 MILLILITER(S): 9 INJECTION, SOLUTION INTRAVENOUS at 21:50

## 2023-01-01 RX ADMIN — CEFTRIAXONE 100 MILLIGRAM(S): 500 INJECTION, POWDER, FOR SOLUTION INTRAMUSCULAR; INTRAVENOUS at 04:30

## 2023-01-01 RX ADMIN — MORPHINE SULFATE 5 MILLIGRAM(S): 50 CAPSULE, EXTENDED RELEASE ORAL at 10:31

## 2023-01-01 RX ADMIN — MIDODRINE HYDROCHLORIDE 5 MILLIGRAM(S): 2.5 TABLET ORAL at 11:55

## 2023-01-01 RX ADMIN — CALCITONIN SALMON 140 INTERNATIONAL UNIT(S): 200 INJECTION, SOLUTION INTRAMUSCULAR at 13:15

## 2023-01-01 RX ADMIN — MORPHINE SULFATE 5 MILLIGRAM(S): 50 CAPSULE, EXTENDED RELEASE ORAL at 20:29

## 2023-01-01 RX ADMIN — Medication 220 MILLIGRAM(S): at 17:03

## 2023-01-01 RX ADMIN — MORPHINE SULFATE 5 MILLIGRAM(S): 50 CAPSULE, EXTENDED RELEASE ORAL at 06:10

## 2023-01-01 RX ADMIN — MORPHINE SULFATE 5 MILLIGRAM(S): 50 CAPSULE, EXTENDED RELEASE ORAL at 14:57

## 2023-01-01 RX ADMIN — MIRTAZAPINE 15 MILLIGRAM(S): 45 TABLET, ORALLY DISINTEGRATING ORAL at 23:02

## 2023-01-01 RX ADMIN — ENOXAPARIN SODIUM 40 MILLIGRAM(S): 100 INJECTION SUBCUTANEOUS at 23:05

## 2023-01-01 RX ADMIN — MORPHINE SULFATE 5 MILLIGRAM(S): 50 CAPSULE, EXTENDED RELEASE ORAL at 12:45

## 2023-01-01 RX ADMIN — ENOXAPARIN SODIUM 40 MILLIGRAM(S): 100 INJECTION SUBCUTANEOUS at 23:01

## 2023-01-01 RX ADMIN — MORPHINE SULFATE 5 MILLIGRAM(S): 50 CAPSULE, EXTENDED RELEASE ORAL at 06:24

## 2023-01-01 RX ADMIN — MORPHINE SULFATE 3 MILLIGRAM(S): 50 CAPSULE, EXTENDED RELEASE ORAL at 08:35

## 2023-01-01 RX ADMIN — Medication 40 MILLIEQUIVALENT(S): at 13:00

## 2023-01-01 RX ADMIN — MORPHINE SULFATE 5 MILLIGRAM(S): 50 CAPSULE, EXTENDED RELEASE ORAL at 04:25

## 2023-01-01 RX ADMIN — ENOXAPARIN SODIUM 40 MILLIGRAM(S): 100 INJECTION SUBCUTANEOUS at 21:06

## 2023-01-01 RX ADMIN — Medication 325 MILLIGRAM(S): at 06:10

## 2023-01-01 RX ADMIN — POLYETHYLENE GLYCOL 3350 17 GRAM(S): 17 POWDER, FOR SOLUTION ORAL at 22:45

## 2023-01-01 RX ADMIN — MORPHINE SULFATE 5 MILLIGRAM(S): 50 CAPSULE, EXTENDED RELEASE ORAL at 06:53

## 2023-01-01 RX ADMIN — MORPHINE SULFATE 5 MILLIGRAM(S): 50 CAPSULE, EXTENDED RELEASE ORAL at 05:36

## 2023-01-01 RX ADMIN — MIDODRINE HYDROCHLORIDE 5 MILLIGRAM(S): 2.5 TABLET ORAL at 20:47

## 2023-01-01 RX ADMIN — MORPHINE SULFATE 5 MILLIGRAM(S): 50 CAPSULE, EXTENDED RELEASE ORAL at 08:28

## 2023-01-01 RX ADMIN — MORPHINE SULFATE 2.5 MILLIGRAM(S): 50 CAPSULE, EXTENDED RELEASE ORAL at 08:43

## 2023-01-01 RX ADMIN — MORPHINE SULFATE 5 MILLIGRAM(S): 50 CAPSULE, EXTENDED RELEASE ORAL at 23:00

## 2023-01-01 RX ADMIN — MORPHINE SULFATE 5 MILLIGRAM(S): 50 CAPSULE, EXTENDED RELEASE ORAL at 01:51

## 2023-01-01 RX ADMIN — MORPHINE SULFATE 5 MILLIGRAM(S): 50 CAPSULE, EXTENDED RELEASE ORAL at 02:40

## 2023-01-01 RX ADMIN — MORPHINE SULFATE 5 MILLIGRAM(S): 50 CAPSULE, EXTENDED RELEASE ORAL at 06:25

## 2023-01-01 RX ADMIN — MORPHINE SULFATE 5 MILLIGRAM(S): 50 CAPSULE, EXTENDED RELEASE ORAL at 19:37

## 2023-01-01 RX ADMIN — MORPHINE SULFATE 5 MILLIGRAM(S): 50 CAPSULE, EXTENDED RELEASE ORAL at 22:34

## 2023-01-01 RX ADMIN — Medication 100 MILLIEQUIVALENT(S): at 17:55

## 2023-01-01 RX ADMIN — MORPHINE SULFATE 5 MILLIGRAM(S): 50 CAPSULE, EXTENDED RELEASE ORAL at 18:37

## 2023-01-01 RX ADMIN — MORPHINE SULFATE 5 MILLIGRAM(S): 50 CAPSULE, EXTENDED RELEASE ORAL at 08:19

## 2023-01-01 RX ADMIN — MORPHINE SULFATE 2.5 MILLIGRAM(S): 50 CAPSULE, EXTENDED RELEASE ORAL at 09:00

## 2023-01-01 RX ADMIN — Medication 25 GRAM(S): at 13:00

## 2023-01-01 RX ADMIN — MORPHINE SULFATE 5 MILLIGRAM(S): 50 CAPSULE, EXTENDED RELEASE ORAL at 23:55

## 2023-01-01 RX ADMIN — MORPHINE SULFATE 5 MILLIGRAM(S): 50 CAPSULE, EXTENDED RELEASE ORAL at 22:15

## 2023-01-01 RX ADMIN — MORPHINE SULFATE 3 MILLIGRAM(S): 50 CAPSULE, EXTENDED RELEASE ORAL at 20:56

## 2023-01-01 RX ADMIN — Medication 325 MILLIGRAM(S): at 13:39

## 2023-01-01 RX ADMIN — MORPHINE SULFATE 5 MILLIGRAM(S): 50 CAPSULE, EXTENDED RELEASE ORAL at 18:08

## 2023-01-01 RX ADMIN — ENOXAPARIN SODIUM 40 MILLIGRAM(S): 100 INJECTION SUBCUTANEOUS at 00:31

## 2023-01-01 RX ADMIN — MORPHINE SULFATE 5 MILLIGRAM(S): 50 CAPSULE, EXTENDED RELEASE ORAL at 22:05

## 2023-01-01 RX ADMIN — MORPHINE SULFATE 5 MILLIGRAM(S): 50 CAPSULE, EXTENDED RELEASE ORAL at 05:55

## 2023-01-01 RX ADMIN — SODIUM CHLORIDE 200 MILLILITER(S): 9 INJECTION INTRAMUSCULAR; INTRAVENOUS; SUBCUTANEOUS at 03:01

## 2023-01-01 RX ADMIN — MIDODRINE HYDROCHLORIDE 5 MILLIGRAM(S): 2.5 TABLET ORAL at 21:36

## 2023-01-01 RX ADMIN — MORPHINE SULFATE 5 MILLIGRAM(S): 50 CAPSULE, EXTENDED RELEASE ORAL at 22:43

## 2023-01-01 RX ADMIN — SODIUM CHLORIDE 100 MILLILITER(S): 9 INJECTION, SOLUTION INTRAVENOUS at 13:05

## 2023-01-01 RX ADMIN — MORPHINE SULFATE 5 MILLIGRAM(S): 50 CAPSULE, EXTENDED RELEASE ORAL at 09:16

## 2023-01-01 RX ADMIN — MORPHINE SULFATE 5 MILLIGRAM(S): 50 CAPSULE, EXTENDED RELEASE ORAL at 18:46

## 2023-01-01 RX ADMIN — Medication 325 MILLIGRAM(S): at 04:20

## 2023-01-01 RX ADMIN — CEFTRIAXONE 100 MILLIGRAM(S): 500 INJECTION, POWDER, FOR SOLUTION INTRAMUSCULAR; INTRAVENOUS at 04:34

## 2023-01-01 RX ADMIN — MORPHINE SULFATE 5 MILLIGRAM(S): 50 CAPSULE, EXTENDED RELEASE ORAL at 02:18

## 2023-01-01 RX ADMIN — MORPHINE SULFATE 5 MILLIGRAM(S): 50 CAPSULE, EXTENDED RELEASE ORAL at 14:13

## 2023-01-01 RX ADMIN — MORPHINE SULFATE 5 MILLIGRAM(S): 50 CAPSULE, EXTENDED RELEASE ORAL at 09:45

## 2023-01-01 RX ADMIN — Medication 25 GRAM(S): at 17:26

## 2023-01-01 RX ADMIN — ENOXAPARIN SODIUM 40 MILLIGRAM(S): 100 INJECTION SUBCUTANEOUS at 13:22

## 2023-01-01 RX ADMIN — SODIUM CHLORIDE 120 MILLILITER(S): 9 INJECTION, SOLUTION INTRAVENOUS at 23:58

## 2023-01-01 RX ADMIN — MORPHINE SULFATE 3 MILLIGRAM(S): 50 CAPSULE, EXTENDED RELEASE ORAL at 08:02

## 2023-01-01 RX ADMIN — MORPHINE SULFATE 5 MILLIGRAM(S): 50 CAPSULE, EXTENDED RELEASE ORAL at 21:00

## 2023-01-01 RX ADMIN — MORPHINE SULFATE 3 MILLIGRAM(S): 50 CAPSULE, EXTENDED RELEASE ORAL at 02:24

## 2023-01-01 RX ADMIN — Medication 100 MILLIEQUIVALENT(S): at 21:53

## 2023-01-01 RX ADMIN — MORPHINE SULFATE 5 MILLIGRAM(S): 50 CAPSULE, EXTENDED RELEASE ORAL at 11:04

## 2023-01-01 RX ADMIN — Medication 325 MILLIGRAM(S): at 08:28

## 2023-01-01 RX ADMIN — MORPHINE SULFATE 5 MILLIGRAM(S): 50 CAPSULE, EXTENDED RELEASE ORAL at 10:49

## 2023-01-01 RX ADMIN — SODIUM CHLORIDE 80 MILLILITER(S): 9 INJECTION, SOLUTION INTRAVENOUS at 11:58

## 2023-01-01 RX ADMIN — SODIUM CHLORIDE 80 MILLILITER(S): 9 INJECTION, SOLUTION INTRAVENOUS at 01:00

## 2023-01-01 RX ADMIN — SODIUM CHLORIDE 100 MILLILITER(S): 9 INJECTION, SOLUTION INTRAVENOUS at 06:11

## 2023-01-01 RX ADMIN — MORPHINE SULFATE 3 MILLIGRAM(S): 50 CAPSULE, EXTENDED RELEASE ORAL at 01:34

## 2023-01-01 RX ADMIN — ENOXAPARIN SODIUM 40 MILLIGRAM(S): 100 INJECTION SUBCUTANEOUS at 01:03

## 2023-01-01 RX ADMIN — MIDODRINE HYDROCHLORIDE 5 MILLIGRAM(S): 2.5 TABLET ORAL at 18:05

## 2023-01-01 RX ADMIN — Medication 325 MILLIGRAM(S): at 18:05

## 2023-01-01 RX ADMIN — MORPHINE SULFATE 5 MILLIGRAM(S): 50 CAPSULE, EXTENDED RELEASE ORAL at 10:24

## 2023-01-01 RX ADMIN — MORPHINE SULFATE 5 MILLIGRAM(S): 50 CAPSULE, EXTENDED RELEASE ORAL at 19:49

## 2023-01-01 RX ADMIN — MORPHINE SULFATE 1 MILLIGRAM(S): 50 CAPSULE, EXTENDED RELEASE ORAL at 01:10

## 2023-01-01 RX ADMIN — MORPHINE SULFATE 5 MILLIGRAM(S): 50 CAPSULE, EXTENDED RELEASE ORAL at 00:49

## 2023-01-01 RX ADMIN — MORPHINE SULFATE 5 MILLIGRAM(S): 50 CAPSULE, EXTENDED RELEASE ORAL at 13:37

## 2023-01-01 RX ADMIN — MORPHINE SULFATE 5 MILLIGRAM(S): 50 CAPSULE, EXTENDED RELEASE ORAL at 01:20

## 2023-01-01 RX ADMIN — SODIUM CHLORIDE 250 MILLILITER(S): 9 INJECTION INTRAMUSCULAR; INTRAVENOUS; SUBCUTANEOUS at 05:00

## 2023-01-01 RX ADMIN — ENOXAPARIN SODIUM 40 MILLIGRAM(S): 100 INJECTION SUBCUTANEOUS at 00:17

## 2023-01-01 RX ADMIN — Medication 325 MILLIGRAM(S): at 09:30

## 2023-01-01 RX ADMIN — SODIUM CHLORIDE 250 MILLILITER(S): 9 INJECTION INTRAMUSCULAR; INTRAVENOUS; SUBCUTANEOUS at 14:28

## 2023-01-01 RX ADMIN — MORPHINE SULFATE 5 MILLIGRAM(S): 50 CAPSULE, EXTENDED RELEASE ORAL at 12:03

## 2023-01-01 RX ADMIN — SODIUM CHLORIDE 40 MILLILITER(S): 9 INJECTION, SOLUTION INTRAVENOUS at 13:10

## 2023-01-01 RX ADMIN — SODIUM CHLORIDE 500 MILLILITER(S): 9 INJECTION INTRAMUSCULAR; INTRAVENOUS; SUBCUTANEOUS at 01:26

## 2023-01-01 RX ADMIN — MORPHINE SULFATE 5 MILLIGRAM(S): 50 CAPSULE, EXTENDED RELEASE ORAL at 12:07

## 2023-01-01 RX ADMIN — MORPHINE SULFATE 5 MILLIGRAM(S): 50 CAPSULE, EXTENDED RELEASE ORAL at 14:10

## 2023-01-01 RX ADMIN — Medication 40 MILLIEQUIVALENT(S): at 12:00

## 2023-01-01 RX ADMIN — MORPHINE SULFATE 5 MILLIGRAM(S): 50 CAPSULE, EXTENDED RELEASE ORAL at 19:10

## 2023-01-01 RX ADMIN — MORPHINE SULFATE 5 MILLIGRAM(S): 50 CAPSULE, EXTENDED RELEASE ORAL at 22:41

## 2023-01-01 RX ADMIN — MORPHINE SULFATE 5 MILLIGRAM(S): 50 CAPSULE, EXTENDED RELEASE ORAL at 13:38

## 2023-01-01 RX ADMIN — MORPHINE SULFATE 5 MILLIGRAM(S): 50 CAPSULE, EXTENDED RELEASE ORAL at 02:37

## 2023-01-01 RX ADMIN — SODIUM CHLORIDE 100 MILLILITER(S): 9 INJECTION, SOLUTION INTRAVENOUS at 01:31

## 2023-01-01 RX ADMIN — SODIUM CHLORIDE 1000 MILLILITER(S): 9 INJECTION, SOLUTION INTRAVENOUS at 20:37

## 2023-01-01 RX ADMIN — CALCITONIN SALMON 140 INTERNATIONAL UNIT(S): 200 INJECTION, SOLUTION INTRAMUSCULAR at 00:02

## 2023-01-01 RX ADMIN — MORPHINE SULFATE 5 MILLIGRAM(S): 50 CAPSULE, EXTENDED RELEASE ORAL at 06:52

## 2023-01-01 RX ADMIN — MORPHINE SULFATE 5 MILLIGRAM(S): 50 CAPSULE, EXTENDED RELEASE ORAL at 23:21

## 2023-01-01 RX ADMIN — Medication 325 MILLIGRAM(S): at 11:36

## 2023-01-01 RX ADMIN — MORPHINE SULFATE 5 MILLIGRAM(S): 50 CAPSULE, EXTENDED RELEASE ORAL at 17:25

## 2023-01-01 RX ADMIN — MORPHINE SULFATE 5 MILLIGRAM(S): 50 CAPSULE, EXTENDED RELEASE ORAL at 22:11

## 2023-01-01 RX ADMIN — MORPHINE SULFATE 5 MILLIGRAM(S): 50 CAPSULE, EXTENDED RELEASE ORAL at 19:53

## 2023-01-01 RX ADMIN — MIDODRINE HYDROCHLORIDE 5 MILLIGRAM(S): 2.5 TABLET ORAL at 00:26

## 2023-01-01 RX ADMIN — MORPHINE SULFATE 5 MILLIGRAM(S): 50 CAPSULE, EXTENDED RELEASE ORAL at 21:29

## 2023-01-01 RX ADMIN — Medication 100 MILLIEQUIVALENT(S): at 15:35

## 2023-01-01 RX ADMIN — Medication 325 MILLIGRAM(S): at 15:00

## 2023-01-01 RX ADMIN — ENOXAPARIN SODIUM 30 MILLIGRAM(S): 100 INJECTION SUBCUTANEOUS at 13:11

## 2023-01-01 RX ADMIN — CHLORHEXIDINE GLUCONATE 1 APPLICATION(S): 213 SOLUTION TOPICAL at 16:48

## 2023-01-01 RX ADMIN — CHLORHEXIDINE GLUCONATE 1 APPLICATION(S): 213 SOLUTION TOPICAL at 05:55

## 2023-01-01 RX ADMIN — MORPHINE SULFATE 5 MILLIGRAM(S): 50 CAPSULE, EXTENDED RELEASE ORAL at 02:43

## 2023-01-01 RX ADMIN — MORPHINE SULFATE 5 MILLIGRAM(S): 50 CAPSULE, EXTENDED RELEASE ORAL at 14:05

## 2023-01-01 RX ADMIN — MIDODRINE HYDROCHLORIDE 5 MILLIGRAM(S): 2.5 TABLET ORAL at 17:11

## 2023-01-01 RX ADMIN — MORPHINE SULFATE 5 MILLIGRAM(S): 50 CAPSULE, EXTENDED RELEASE ORAL at 00:37

## 2023-01-01 RX ADMIN — MORPHINE SULFATE 5 MILLIGRAM(S): 50 CAPSULE, EXTENDED RELEASE ORAL at 10:00

## 2023-01-01 RX ADMIN — ENOXAPARIN SODIUM 40 MILLIGRAM(S): 100 INJECTION SUBCUTANEOUS at 22:24

## 2023-01-02 NOTE — PHYSICAL EXAM
[Capable of only limited self care, confined to bed or chair more than 50% of waking hours] : Status 3- Capable of only limited self care, confined to bed or chair more than 50% of waking hours [Thin] : thin [Normal] : affect appropriate [de-identified] : lower back tenderness

## 2023-01-02 NOTE — ASSESSMENT
[FreeTextEntry1] : 57 years old female with history of T1 bladder cancer with low and high grade features and clinical T2 ureteral high grade UCC with left enlarged para-aortic node, however, negative biopsy s/p cisplatin and gemcitabine x 3 cycles complicated with renal failure s/p right side PCN. Her 10/17/2022 CT chest/abdomen/pelvis showed multiple bilateral lung nodules and liver lesions suspicious of metastatic urothelial cell carcinoma. She has poor performance status at 3 on wheel chair and cachexia. \par \par I had a lengthy discussion with the patient regarding her cancer status and further management. Although she is likely to have metastatic urothelial cell carcinoma to the liver and lungs, she will need restaging work up including CT chest/abdomen/pelvis and a biopsy for her liver lesions. Will continue to offer her supportive care including pain control and blood transfusion. She was discussed with nutrition with increased protein intake, not just chips she is eating a lot. After tissue confirmation, standard of care is cisplatin/carboplatin and gemcitabine 4 to 6 cycles, at least stable disease, will be maintained with avelumab. However, she has poor performance status and cachexia. She currently is not a chemo or even only immunotherapy candidate. Her many questions were answered in full to her satisfaction.

## 2023-01-02 NOTE — REASON FOR VISIT
[Initial Consultation] : an initial consultation [Family Member] : family member [FreeTextEntry2] : m[facility]

## 2023-01-02 NOTE — HISTORY OF PRESENT ILLNESS
[Disease: _____________________] : Disease: [unfilled] [T: ___] : T[unfilled] [N: ___] : N[unfilled] [M: ___] : M[unfilled] [de-identified] : Zoë Marsh is a 57 years old female \par \par October 8 of 2020, she underwent a retroperitoneal core biopsy of the lymph node which revealed lymphoid tissue, negative for metastatic carcinoma. This was at [facility].\par The pathology from September 2021 revealed noninvasive urothelial carcinoma, low-grade with high-grade components. Muscularis propria was not identified in the specimen. There was no lymphovascular invasion. A deep biopsy revealed noninvasive urothelial carcinoma, both low-grade and high-grade components. No lymphovascular invasion was noted.\par A consultative report from [facility] dated May 8 of 2020 was a review of slides from the [facility]. This revealed noninvasive urothelial carcinoma with focal squamous features. It was called high-grade papillary. No local invasion was noted. Muscle was not present. A biopsy from the left mid ureter did not reveal any evidence of carcinoma was a very small biopsy. That report only contains 1 page, and there is a bladder tumor biopsy #3 which was read as invasive urothelial carcinoma with focal squamous features. This was a high-grade papillary tumor. It invaded lamina propria focally. Muscularis propria was not identified.\par Another pathology review at [facility] was from [facility]. This was a renal pelvis biopsy from the left side. This was read as atypical urothelial proliferation, indefinite for carcinoma.\par A discharge summary for [facility] office some additional information. She was admitted on January 30 of this year discharged on February 4. It says that on February 27 of 2020, the patient underwent a transurethral resection of a bladder tumor and left ureteroscopy/left ureteral stent placement with pathology showing high-grade urothelial cell carcinoma. The patient presented at [facility] on April 13 of 2020 for evaluation. She was initially followed by active surveillance. On July 15 of 2020, a PET/CT showed interval development of posterior bladder wall irregularity, consistent with the biopsied bladder carcinoma. Focal intense activity was noted at the left mid ureter associated with new poor left kidney excretion and increased calyectasis and increasing dilatation of the left renal pelvis. There were new mildly active small left para-aortic lymph nodes adjacent to the proximal ureter. The patient underwent urine cytology which was positive for urothelial carcinoma. On September 22, 2020, a CT urogram showed increasing size of multiple bladder masses with new and increased multiple enhancing lesions in the left renal collecting system and throughout the left ureter consistent with urothelial neoplasms. A lymph node core biopsy was performed on October 8 of 2020, revealing lymphoid tissue which was negative for metastatic carcinoma. She was treated with cisplatin and gemcitabine starting on December 18 of 2020. She was admitted on January 13 of 2021 for the management of right-sided abdominal and back pain with acute kidney injury with a diagnosis of pyelonephritis and a likely prerenal component. The patient presented on January 29 of 2021 with a complaint of gross hematuria. Urine showed Klebsiella. Sonogram revealed increased mild right hydronephrosis. The patient was treated with ceftriaxone. Her nephrostomy tube was replaced and a right-sided nephrostomy tube was placed. The note indicates that she was followed by Dr. Pallavi Hernandez. Her creatinine had increased to 2.3 from a baseline of 1.1-1.2, and the creatinine decreased to 1.7 prior to discharge. No further chemo was given after this. her PCN came out after an accident in June, and the PCN was replaced. She had another PCN change in July at [facility]. She has not been back for [facility] since that time. She apparently only received 3 cycles of treatment. \par \par In 2022 she was admitted anemia, multiple times for UTI, hemoptysis, shortness of breath, worsening chest pain and lower back pain. \par \par 10/17/2022 CT chest/abdomen/pelvis showed 1. Large bladder mass is without significant change. Severe chronic hydronephrosis of the left kidney is unchanged. Right nephrostomy tube in place; no right hydronephrosis. 2. Numerous bilateral pulmonary metastases; mild interval increase in size of a few of the largest pulmonary metastases compared to 9/20/2022. 3. Progression of liver metastases.\par \par She has been cachectic and lost a lot of weight, reports sometimes shortness of brush, whitish thrush in the mouth, weight loss, intermittent pain in the PCN tube site and lower back due to osteoporosis.     [de-identified] : urothelial cell carcinoma

## 2023-01-02 NOTE — REVIEW OF SYSTEMS
[Fatigue] : fatigue [Recent Change In Weight] : ~T recent weight change [Lower Ext Edema] : lower extremity edema [Shortness Of Breath] : shortness of breath [SOB on Exertion] : shortness of breath during exertion [Constipation] : constipation [Anxiety] : anxiety [Negative] : Heme/Lymph [Fever] : no fever [Chills] : no chills [Chest Pain] : no chest pain [Palpitations] : no palpitations [Abdominal Pain] : no abdominal pain [Vomiting] : no vomiting [Diarrhea] : no diarrhea [Suicidal] : not suicidal [Insomnia] : no insomnia [FreeTextEntry9] : lower back pain

## 2023-01-03 PROBLEM — C67.9 BLADDER CANCER: Status: ACTIVE | Noted: 2021-05-21

## 2023-01-06 NOTE — CONSULT NOTE ADULT - ASSESSMENT
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 Patient reported the pain is sharp well localized and occasionally pleuritic. Has chronic cough with bloody or brown sputum. Reports feeling dehydrated. Has not had chemo since 5/2022. Patient denied abdominal pain, N/V/D, fever, urinary symptoms, rash  pt with metastatic bladder ca / onc eval  s/p ppm , sss  chest pain doubt cardiac ?metastatic ca  dvt prophylaxis

## 2023-01-06 NOTE — HISTORY OF PRESENT ILLNESS
[Disease: _____________________] : Disease: [unfilled] [T: ___] : T[unfilled] [N: ___] : N[unfilled] [M: ___] : M[unfilled] [de-identified] : Zoë Marsh is a 57 years old female \par \par October 8 of 2020, she underwent a retroperitoneal core biopsy of the lymph node which revealed lymphoid tissue, negative for metastatic carcinoma. This was at [facility].\par The pathology from September 2021 revealed noninvasive urothelial carcinoma, low-grade with high-grade components. Muscularis propria was not identified in the specimen. There was no lymphovascular invasion. A deep biopsy revealed noninvasive urothelial carcinoma, both low-grade and high-grade components. No lymphovascular invasion was noted.\par A consultative report from [facility] dated May 8 of 2020 was a review of slides from the [facility]. This revealed noninvasive urothelial carcinoma with focal squamous features. It was called high-grade papillary. No local invasion was noted. Muscle was not present. A biopsy from the left mid ureter did not reveal any evidence of carcinoma was a very small biopsy. That report only contains 1 page, and there is a bladder tumor biopsy #3 which was read as invasive urothelial carcinoma with focal squamous features. This was a high-grade papillary tumor. It invaded lamina propria focally. Muscularis propria was not identified.\par Another pathology review at [facility] was from [facility]. This was a renal pelvis biopsy from the left side. This was read as atypical urothelial proliferation, indefinite for carcinoma.\par A discharge summary for [facility] office some additional information. She was admitted on January 30 of this year discharged on February 4. It says that on February 27 of 2020, the patient underwent a transurethral resection of a bladder tumor and left ureteroscopy/left ureteral stent placement with pathology showing high-grade urothelial cell carcinoma. The patient presented at [facility] on April 13 of 2020 for evaluation. She was initially followed by active surveillance. On July 15 of 2020, a PET/CT showed interval development of posterior bladder wall irregularity, consistent with the biopsied bladder carcinoma. Focal intense activity was noted at the left mid ureter associated with new poor left kidney excretion and increased calyectasis and increasing dilatation of the left renal pelvis. There were new mildly active small left para-aortic lymph nodes adjacent to the proximal ureter. The patient underwent urine cytology which was positive for urothelial carcinoma. On September 22, 2020, a CT urogram showed increasing size of multiple bladder masses with new and increased multiple enhancing lesions in the left renal collecting system and throughout the left ureter consistent with urothelial neoplasms. A lymph node core biopsy was performed on October 8 of 2020, revealing lymphoid tissue which was negative for metastatic carcinoma. She was treated with cisplatin and gemcitabine starting on December 18 of 2020. She was admitted on January 13 of 2021 for the management of right-sided abdominal and back pain with acute kidney injury with a diagnosis of pyelonephritis and a likely prerenal component. The patient presented on January 29 of 2021 with a complaint of gross hematuria. Urine showed Klebsiella. Sonogram revealed increased mild right hydronephrosis. The patient was treated with ceftriaxone. Her nephrostomy tube was replaced and a right-sided nephrostomy tube was placed. The note indicates that she was followed by Dr. Pallavi Hernandez. Her creatinine had increased to 2.3 from a baseline of 1.1-1.2, and the creatinine decreased to 1.7 prior to discharge. No further chemo was given after this. her PCN came out after an accident in June, and the PCN was replaced. She had another PCN change in July at [facility]. She has not been back for [facility] since that time. She apparently only received 3 cycles of treatment. \par \par In 2022 she was admitted anemia, multiple times for UTI, hemoptysis, shortness of breath, worsening chest pain and lower back pain. \par \par 10/17/2022 CT chest/abdomen/pelvis showed 1. Large bladder mass is without significant change. Severe chronic hydronephrosis of the left kidney is unchanged. Right nephrostomy tube in place; no right hydronephrosis. 2. Numerous bilateral pulmonary metastases; mild interval increase in size of a few of the largest pulmonary metastases compared to 9/20/2022. 3. Progression of liver metastases.\par \par She has been cachectic and lost a lot of weight, reports sometimes shortness of brush, whitish thrush in the mouth, weight loss, intermittent pain in the PCN tube site and lower back due to osteoporosis.     [de-identified] : urothelial cell carcinoma [de-identified] : \par \par 1/6/2023: After last visit on 12/23 patient went to [facility] and had blood transfusion for Hgb 7.9. She hwas set up for appts for CT chest and AP as well as liver biopsy she cancelled these.

## 2023-01-06 NOTE — ED PROVIDER NOTE - NS ED ATTENDING STATEMENT MOD
This was a shared visit with the SAIMA. I reviewed and verified the documentation and independently performed the documented: I have personally provided the amount of critical care time documented below excluding time spent on separate procedures.

## 2023-01-06 NOTE — H&P ADULT - EYES
Continued Stay Note  [facility]     Patient Name: Philip Calloway  MRN: [id number]  Today's Date: 3/4/2020    Admit Date: 3/2/2020    Discharge Plan     Row Name 03/04/20 1054       Plan    Plan  DME    Plan Comments  Order noted for commode. Patient agreeable to using [facility]. Order faxed for delivery.         Discharge Codes    No documentation.             Petra Christian RN     EOMI/non icteric sclera

## 2023-01-06 NOTE — H&P ADULT - PROBLEM SELECTOR PLAN 2
-chronic  however with h/o productive cough with pleuritic chest pain and elevated PCT, will treat empirically for possible PNA  -trend WBC daily  -c/w ceftriaxone and azithro

## 2023-01-06 NOTE — H&P ADULT - NSHPLABSRESULTS_GEN_ALL_CORE
9.6    16.51 )-----------( 316      ( 06 Jan 2023 19:43 )             32.4       01-06    138  |  101  |  34<H>  ----------------------------<  104<H>  3.7   |  27  |  0.99    Ca    14.9<HH>      06 Jan 2023 22:35  Mg     2.0     01-06    TPro  8.1  /  Alb  3.5  /  TBili  0.4  /  DBili  x   /  AST  26  /  ALT  19  /  AlkPhos  339<H>  01-06            LIVER FUNCTIONS - ( 06 Jan 2023 19:43 )  Alb: 3.5 g/dL / Pro: 8.1 g/dL / ALK PHOS: 339 U/L / ALT: 19 U/L / AST: 26 U/L / GGT: x               I have personally reviewed imaging  I have personally reviewed EKG

## 2023-01-06 NOTE — ED PROVIDER NOTE - ATTENDING APP SHARED VISIT CONTRIBUTION OF CARE
------------ATTENDING NOTE------------  pt w/ family brought to ED by EMS, per EMS pt sent from Clinic today c/o chest pain and increased fatigue, pt describes constant mild dull ache in center of chest w/o radiation for past several days, feels increased dyspnea/weakness w/ activity, on 2L NC constantly, decreased PO and feels lightheaded/thirsty, complicated by metastatic lung cancer and exacerbation of multiple underlying comorbidities, awaiting labs/imaging and close reassessments and c/w Onc for tx, planned admission for tx, continued suarez, optimize medical mgmt, outpt needs assessments-->  - Eddie Jean MD   -------------------------------------------- ------------ATTENDING NOTE------------  pt w/ family brought to ED by EMS, per EMS pt sent from Clinic today c/o chest pain and increased fatigue, pt describes constant mild dull ache in center of chest w/o radiation for past several days, feels increased dyspnea/weakness w/ activity, on 2L NC constantly, decreased PO and feels lightheaded/thirsty, complicated by metastatic lung cancer and exacerbation of multiple underlying comorbidities, awaiting labs/imaging and close reassessments and c/w Onc for tx, planned admission for tx, continued suarez, optimize medical mgmt, outpt needs assessments --> (18:00) pt has been very anxious and refusing IV/labs until given oral pain medications, multiple in depth d/w pt/family about concerns/decision making and expected clinical course -->   - Eddie Jean MD   --------------------------------------------

## 2023-01-06 NOTE — ED ADULT NURSE REASSESSMENT NOTE - NS ED NURSE REASSESS COMMENT FT1
Report received from BRENDEN Coates. Pt found in position of comfort with family at the bedside. Pt endorsing generalized body pain at this time and pain to ulcer on right buttock. Allevyn applied to ulcer but patient refusing oral morphine at this time. AOx4, MAEx4, respirations even and unlabored on 2L NC, skin warm dry and normal for race. Vital signs stable at this time.

## 2023-01-06 NOTE — H&P ADULT - HISTORY OF PRESENT ILLNESS
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 Unable to obtain complete history from pt as she was in significant pain and complained about feeling cold. She mentioned having chronic chest pain however notes this pain in particular started roughly 3-weeks ago described as sharp and pleuritic. She also has a chronic cough productive of reddish/brownish sputum. She reports feeling dehydrated. Has not had chemo since 5/2022. Patient denied abdominal pain, N/V/D, fever, urinary symptoms, rash

## 2023-01-06 NOTE — CONSULT NOTE ADULT - SUBJECTIVE AND OBJECTIVE BOX
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 Patient reported the pain is sharp well localized and occasionally pleuritic. Has chronic cough with bloody or brown sputum. Reports feeling dehydrated. Has not had chemo since 5/2022. Patient denied abdominal pain, N/V/D, fever, urinary symptoms, rash    PAST MEDICAL & SURGICAL HISTORY:  Anemia      Asthma      HLD (hyperlipidemia)      Pacemaker  [employer]      Bladder cancer      HTN (hypertension)      Parathyroid tumor      Liver cyst      Hydronephrosis  has right Nephrostomy tube - dressing intact      Bradycardia      History of renal calculi      Birthmark      H/O myomectomy      Presence of cardiac pacemaker      H/O hydronephrosis  s/p Right Perc nephrostomy tube placement 02/2021, exchange 06/2021          MEDICATIONS  (STANDING):  calcitonin Injectable 140 International Unit(s) IntraMuscular every 12 hours  lactated ringers. 1000 milliLiter(s) (200 mL/Hr) IV Continuous <Continuous>    MEDICATIONS  (PRN):      FAMILY HISTORY:  Family history of prostate cancer (Father)    Family history of CHF (congestive heart failure) (Father)    Family history of atrial fibrillation (Father)        SOCIAL HISTORY:    [x ] Non-smoker  [ ] Smoker  [ ] Alcohol    Allergies    No Known Allergies    Intolerances    	    REVIEW OF SYSTEMS:  CONSTITUTIONAL: No fever, weight loss, or fatigue  EYES: No eye pain, visual disturbances, or discharge  ENT:  No difficulty hearing, tinnitus, vertigo; No sinus or throat pain  NECK: No pain or stiffness  RESPIRATORY: No cough, wheezing, chills or hemoptysis; + Shortness of Breath  CARDIOVASCULAR: No chest pain, palpitations, passing out, dizziness, or leg swelling  GASTROINTESTINAL: No abdominal or epigastric pain. No nausea, vomiting, or hematemesis; No diarrhea or constipation. No melena or hematochezia.  GENITOURINARY: No dysuria, frequency, hematuria, or incontinence  NEUROLOGICAL: No headaches, memory loss, loss of strength, numbness, or tremors  SKIN: No itching, burning, rashes, or lesions   LYMPH Nodes: No enlarged glands  ENDOCRINE: No heat or cold intolerance; No hair loss  MUSCULOSKELETAL: No joint pain or swelling; No muscle, back, or extremity pain  PSYCHIATRIC: No depression, anxiety, mood swings, or difficulty sleeping  HEME/LYMPH: No easy bruising, or bleeding gums  ALLERGY AND IMMUNOLOGIC: No hives or eczema	    [ ] All others negative	  [ ] Unable to obtain    PHYSICAL EXAM:  T(C): 36.9 (01-06-23 @ 20:34), Max: 36.9 (01-06-23 @ 20:34)  HR: 84 (01-06-23 @ 20:34) (84 - 86)  BP: 99/62 (01-06-23 @ 20:34) (96/65 - 99/62)  RR: 18 (01-06-23 @ 20:34) (18 - 18)  SpO2: 99% (01-06-23 @ 20:34) (99% - 99%)  Wt(kg): --  I&O's Summary      Appearance: Normal	  HEENT:   Normal oral mucosa, PERRL, EOMI	  Lymphatic: No lymphadenopathy  Cardiovascular: Normal S1 S2, No JVD, + murmurs, No edema  Respiratory: Lungs clear to auscultation	  Psychiatry: A & O x 3, Mood & affect appropriate  Gastrointestinal:  Soft, Non-tender, + BS	  Skin: No rashes, No ecchymoses, No cyanosis	  Neurologic: Non-focal  Extremities: Normal range of motion, No clubbing, cyanosis or edema  Vascular: Peripheral pulses palpable 2+ bilaterally    TELEMETRY: 	    ECG:  	  RADIOLOGY:  OTHER: 	  	  LABS:	 	    CARDIAC MARKERS:                              9.6    16.51 )-----------( 316      ( 06 Jan 2023 19:43 )             32.4     01-06    139  |  100  |  34<H>  ----------------------------<  102<H>  3.6   |  26  |  1.04    Ca    15.5<HH>      06 Jan 2023 19:43  Mg     2.0     01-06    TPro  8.1  /  Alb  3.5  /  TBili  0.4  /  DBili  x   /  AST  26  /  ALT  19  /  AlkPhos  339<H>  01-06    proBNP: Serum Pro-Brain Natriuretic Peptide: 510 pg/mL (01-06 @ 19:43)    Lipid Profile:   HgA1c:   TSH:       PREVIOUS DIAGNOSTIC TESTING:     < from: Xray Chest 1 View- PORTABLE-Urgent (Xray Chest 1 View- PORTABLE-Urgent .) (12.01.22 @ 18:12) >  IMPRESSION: No acute infiltrate. pulmonary metastatic nodules similar to     < from: CT Abdomen and Pelvis No Cont (10.17.22 @ 17:54) >  1. Large bladder mass is without significant change. Severe chronic   hydronephrosis of the left kidney is unchanged. Right nephrostomy tube in   place; no right hydronephrosis.  2.  Numerous bilateral pulmonary metastases; mild interval increase in   size of a few of the largest pulmonary metastases compared to 9/20/2022.  3.  Progression of liver metastases.  4.  No evidence of osseous metastatic disease.

## 2023-01-06 NOTE — ED PROVIDER NOTE - IV ALTEPLASE EXCL REL HIDDEN
Fluids  Rest  Tylenol if needed    Follow up with your PCP in 3 - 4 days if worsening or not improving.  Lelo Edmonds MD   [address] [place] [address] / [facility] [id number] [phone]     -This is most likely a viral illness.  Please be aware that it can take 7-10 days before having significant improvement.      -Rest    -Push fluids; water, no juice    -Over-the-counter medications as needed for symptom control    -If symptoms persist/do not improve, worsen, new symptoms emerge, or any other concerns, please follow up in urgent care, emergency room or with primary care physician.  
show

## 2023-01-06 NOTE — H&P ADULT - ASSESSMENT
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 Found to have profound hypercalcemia.

## 2023-01-06 NOTE — H&P ADULT - PROBLEM SELECTOR PLAN 1
-pt with h/o parathyroid tumor so could be driving process vs Hypercalcemia of malignancy  -c/w aggressive IV hydration with NS @ 200cc/hr  -s/p calcitonin in ED  -f/u PTH, PTHrP, vitamin d levels  -endo and heme/onc consult in AM

## 2023-01-06 NOTE — ED PROVIDER NOTE - OBJECTIVE STATEMENT
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 Patient reported the pain is sharp well localized and occasionally pleuritic. Has chronic cough with bloody or brown sputum. Reports feeling dehydrated. Has not had chemo since 5/2022. Patient denied abdominal pain, N/V/D, fever, urinary symptoms, rash

## 2023-01-06 NOTE — ED ADULT NURSE REASSESSMENT NOTE - NS ED NURSE REASSESS COMMENT FT1
RN to bedside to medicate. Pt states "I already took tylenol". Offered morphine. She states that she can't take both tylenol and morphine together bc they are too strong. States she needs to take them 2 hours apart. Provider aware. Pt continues to refuse IV at this time.

## 2023-01-06 NOTE — H&P ADULT - NSHPPHYSICALEXAM_GEN_ALL_CORE
Vital Signs Last 24 Hrs  T(C): 36.6 (07 Jan 2023 02:33), Max: 36.9 (06 Jan 2023 20:34)  T(F): 97.9 (07 Jan 2023 02:33), Max: 98.5 (06 Jan 2023 20:34)  HR: 67 (07 Jan 2023 02:33) (67 - 86)  BP: 98/61 (07 Jan 2023 02:33) (96/65 - 104/63)  BP(mean): --  RR: 17 (07 Jan 2023 02:33) (17 - 18)  SpO2: 97% (07 Jan 2023 02:33) (97% - 99%)    Parameters below as of 07 Jan 2023 02:33  Patient On (Oxygen Delivery Method): room air  O2 Flow (L/min): 2      PE limited due to patient distress 2/2 pain and unwillingness to participate in full exam

## 2023-01-06 NOTE — ASSESSMENT
[FreeTextEntry1] : 57 years old female with history of T1 bladder cancer with low and high grade features and clinical T2 ureteral high grade UCC with left enlarged para-aortic node, however, negative biopsy s/p cisplatin and gemcitabine x 3 cycles complicated with renal failure s/p right side PCN. Her 10/17/2022 CT chest/abdomen/pelvis showed multiple bilateral lung nodules and liver lesions suspicious of metastatic urothelial cell carcinoma. She has poor performance status at 3 on wheel chair and cachexia. She needs restaging work up including CT chest/abdomen/pelvis and a biopsy for her liver lesions After tissue confirmation, standard of care is cisplatin/carboplatin and gemcitabine 4 to 6 cycles, at least stable disease, will be maintained with avelumab. However, she has poor performance status and cachexia. She currently is not a chemo or even only immunotherapy candidate. Will continue to offer her supportive care including pain control and blood transfusion.   Plan:  Urothelial Cell Carcinoma, likely metastatic  -    Anemia - Hgb today ____     Instructed to contact our office with any new/worsening symptoms. Patient educated regarding plan of care, all questions/concerns addressed to the best of my abilities and patient's apparent satisfaction. RTC ____

## 2023-01-06 NOTE — ED ADULT NURSE REASSESSMENT NOTE - NS ED NURSE REASSESS COMMENT FT1
RN to bedside to start IV and medicate. RN unable to locate feasible vein to place IV in. Provider made aware. ALEJANDRINA Negrete to bedside to place IV with US. Pt refused. Pt states she does not take her meds IV and that she want PO pain meds. Provider aware.

## 2023-01-06 NOTE — ED PROVIDER NOTE - CARE PLAN
1 Principal Discharge DX:	Chest pain of uncertain etiology  Secondary Diagnosis:	Moderate dehydration  Secondary Diagnosis:	Malaise and fatigue   Principal Discharge DX:	Chest pain of uncertain etiology  Secondary Diagnosis:	Moderate dehydration  Secondary Diagnosis:	Malaise and fatigue  Secondary Diagnosis:	Hypercalcemia

## 2023-01-07 NOTE — PROGRESS NOTE ADULT - SUBJECTIVE AND OBJECTIVE BOX
CARDIOLOGY     PROGRESS  NOTE   ________________________________________________    CHIEF COMPLAINT:Patient is a 57y old  Female who presents with a chief complaint of chest pain (06 Jan 2023 23:58)  pain  	  REVIEW OF SYSTEMS:  CONSTITUTIONAL: No fever, weight loss, or fatigue  EYES: No eye pain, visual disturbances, or discharge  ENT:  No difficulty hearing, tinnitus, vertigo; No sinus or throat pain  NECK: No pain or stiffness  RESPIRATORY: No cough, wheezing, chills or hemoptysis; No Shortness of Breath  CARDIOVASCULAR: No chest pain, palpitations, passing out, dizziness, or leg swelling  GASTROINTESTINAL: No abdominal or epigastric pain. No nausea, vomiting, or hematemesis; No diarrhea or constipation. No melena or hematochezia.  GENITOURINARY: No dysuria, frequency, hematuria, or incontinence  NEUROLOGICAL: No headaches, memory loss, loss of strength, numbness, or tremors  SKIN: No itching, burning, rashes, or lesions   LYMPH Nodes: No enlarged glands  ENDOCRINE: No heat or cold intolerance; No hair loss  MUSCULOSKELETAL: No joint pain or swelling; No muscle, back, or extremity pain  PSYCHIATRIC: No depression, anxiety, mood swings, or difficulty sleeping  HEME/LYMPH: No easy bruising, or bleeding gums  ALLERGY AND IMMUNOLOGIC: No hives or eczema	    [ x] All others negative	  [ ] Unable to obtain    PHYSICAL EXAM:  T(C): 36.3 (01-07-23 @ 05:23), Max: 36.9 (01-06-23 @ 20:34)  HR: 71 (01-07-23 @ 05:23) (67 - 86)  BP: 93/38 (01-07-23 @ 05:23) (93/38 - 104/63)  RR: 16 (01-07-23 @ 05:23) (16 - 18)  SpO2: 98% (01-07-23 @ 05:23) (97% - 99%)  Wt(kg): --  I&O's Summary      Appearance: cachectic	  HEENT:   Normal oral mucosa, PERRL, EOMI	  Lymphatic: No lymphadenopathy  Cardiovascular: Normal S1 S2, No JVD, + murmurs, No edema  Respiratory: rhonchi  Psychiatry: depress  Gastrointestinal:  Soft, Non-tender, + BS	  Skin: No rashes, No ecchymoses, No cyanosis	  Neurologic: Non-focal  Extremities: Normal range of motion, No clubbing, cyanosis or edema  Vascular: Peripheral pulses palpable 2+ bilaterally    MEDICATIONS  (STANDING):  azithromycin   Tablet 500 milliGRAM(s) Oral every 24 hours  calcitonin Injectable 140 International Unit(s) IntraMuscular every 12 hours  cefTRIAXone   IVPB 1000 milliGRAM(s) IV Intermittent every 24 hours  enoxaparin Injectable 40 milliGRAM(s) SubCutaneous every 24 hours  naloxone Injectable 0.4 milliGRAM(s) IV Push once  polyethylene glycol 3350 17 Gram(s) Oral daily  senna 2 Tablet(s) Oral at bedtime  sodium chloride 0.9%. 1000 milliLiter(s) (200 mL/Hr) IV Continuous <Continuous>      TELEMETRY: 	    ECG:  	  RADIOLOGY:  OTHER: 	  	  LABS:	 	    CARDIAC MARKERS:                                9.6    16.51 )-----------( 316      ( 06 Jan 2023 19:43 )             32.4     01-07    141  |  103  |  35<H>  ----------------------------<  109<H>  3.7   |  21<L>  |  0.97    Ca    13.8<HH>      07 Jan 2023 07:33  Mg     2.0     01-06    TPro  6.8  /  Alb  3.0<L>  /  TBili  0.2  /  DBili  x   /  AST  24  /  ALT  18  /  AlkPhos  280<H>  01-07    proBNP: Serum Pro-Brain Natriuretic Peptide: 510 pg/mL (01-06 @ 19:43)  Serum Pro-Brain Natriuretic Peptide: 202 pg/mL (12-24 @ 05:41)    Lipid Profile:   HgA1c:   TSH:     < from: Xray Chest 1 View- PORTABLE-Urgent (Xray Chest 1 View- PORTABLE-Urgent .) (12.01.22 @ 18:12) >  IMPRESSION: No acute infiltrate. pulmonary metastatic nodules similar to   prior      < from: CT Abdomen and Pelvis No Cont (10.17.22 @ 17:54) >  1. Large bladder mass is without significant change. Severe chronic   hydronephrosis of the left kidney is unchanged. Right nephrostomy tube in   place; no right hydronephrosis.  2.  Numerous bilateral pulmonary metastases; mild interval increase in   size of a few of the largest pulmonary metastases compared to 9/20/2022.  3.  Progression of liver metastases.  4.  No evidence of osseous metastatic disease.        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 Patient reported the pain is sharp well localized and occasionally pleuritic. Has chronic cough with bloody or brown sputum. Reports feeling dehydrated. Has not had chemo since 5/2022. Patient denied abdominal pain, N/V/D, fever, urinary symptoms, rash  pt with metastatic bladder ca / onc eval  s/p ppm , sss  chest pain doubt cardiac ?metastatic ca  dvt prophylaxis  severe protein deficiency, nutrition eval  urology / onc eval   gentle hydration  psych eval start remeron 15 mg po qhs

## 2023-01-08 NOTE — CHART NOTE - NSCHARTNOTEFT_GEN_A_CORE
[facility]    Spoke with Urology earlier to consult on pt with bladder mass. Urology deferred at this time pending recommendations from   [facility].  Pt last seen by Oncology outpatient on 1/6/23 for blood draws.  She was seen 2 day prior to that by the Oncology   Attending.       Interventions taken:  Attempted to contact Oncology today. Will contact Oncology in am re: plan of care.   Discussed plan with Medical Attending.         Dorina Toribio ANP-BC  Spectralink #[id number]

## 2023-01-08 NOTE — PROGRESS NOTE ADULT - SUBJECTIVE AND OBJECTIVE BOX
CARDIOLOGY     PROGRESS  NOTE   ________________________________________________    CHIEF COMPLAINT:Patient is a 57y old  Female who presents with a chief complaint of chest pain (07 Jan 2023 11:54)  still c/o intermittent chest pain  	  REVIEW OF SYSTEMS:  CONSTITUTIONAL: No fever, weight loss, or fatigue  EYES: No eye pain, visual disturbances, or discharge  ENT:  No difficulty hearing, tinnitus, vertigo; No sinus or throat pain  NECK: No pain or stiffness  RESPIRATORY: No cough, wheezing, chills or hemoptysis; No Shortness of Breath  CARDIOVASCULAR: + chest pain,no  palpitations, passing out, dizziness, or leg swelling  GASTROINTESTINAL: No abdominal or epigastric pain. No nausea, vomiting, or hematemesis; No diarrhea or constipation. No melena or hematochezia.  GENITOURINARY: No dysuria, frequency, hematuria, or incontinence  NEUROLOGICAL: No headaches, memory loss, loss of strength, numbness, or tremors  SKIN: No itching, burning, rashes, or lesions   LYMPH Nodes: No enlarged glands  ENDOCRINE: No heat or cold intolerance; No hair loss  MUSCULOSKELETAL: No joint pain or swelling; No muscle, back, or extremity pain  PSYCHIATRIC: No depression, anxiety, mood swings, or difficulty sleeping  HEME/LYMPH: No easy bruising, or bleeding gums  ALLERGY AND IMMUNOLOGIC: No hives or eczema	    x[ ] All others negative	  [ ] Unable to obtain    PHYSICAL EXAM:  T(C): 35.6 (01-07-23 @ 23:00), Max: 36.3 (01-07-23 @ 11:42)  HR: 55 (01-08-23 @ 03:39) (55 - 78)  BP: 124/71 (01-08-23 @ 03:39) (99/64 - 124/71)  RR: 15 (01-08-23 @ 03:39) (14 - 16)  SpO2: 100% (01-08-23 @ 03:39) (98% - 100%)  Wt(kg): --  I&O's Summary    07 Jan 2023 07:01  -  08 Jan 2023 07:00  --------------------------------------------------------  IN: 750 mL / OUT: 0 mL / NET: 750 mL        Appearance: Normal	  HEENT:   Normal oral mucosa, PERRL, EOMI	  Lymphatic: No lymphadenopathy  Cardiovascular: Normal S1 S2, No JVD, + murmurs, No edema/ ppm   Respiratory: Lungs clear to auscultation	  Psychiatry: A & O x 3, Mood & affect appropriate/ depress  Gastrointestinal:  Soft, Non-tender, + BS	  Skin: No rashes, No ecchymoses, No cyanosis	  Neurologic: Non-focal  Extremities: Normal range of motion, No clubbing, cyanosis or edema  Vascular: Peripheral pulses palpable 2+ bilaterally    MEDICATIONS  (STANDING):  azithromycin   Tablet 500 milliGRAM(s) Oral every 24 hours  cefTRIAXone   IVPB 1000 milliGRAM(s) IV Intermittent every 24 hours  enoxaparin Injectable 40 milliGRAM(s) SubCutaneous every 24 hours  ergocalciferol 63801 Unit(s) Oral <User Schedule>  lactated ringers. 1000 milliLiter(s) (60 mL/Hr) IV Continuous <Continuous>  mirtazapine 15 milliGRAM(s) Oral at bedtime  naloxone Injectable 0.4 milliGRAM(s) IV Push once  polyethylene glycol 3350 17 Gram(s) Oral daily  senna 2 Tablet(s) Oral at bedtime      TELEMETRY: 	    ECG:  	  RADIOLOGY:  OTHER: 	  	  LABS:	 	    CARDIAC MARKERS:                                9.6    16.51 )-----------( 316      ( 06 Jan 2023 19:43 )             32.4     01-07    141  |  103  |  35<H>  ----------------------------<  109<H>  3.7   |  21<L>  |  0.97    Ca    13.8<HH>      07 Jan 2023 07:33  Mg     2.0     01-06    TPro  6.8  /  Alb  3.0<L>  /  TBili  0.2  /  DBili  x   /  AST  24  /  ALT  18  /  AlkPhos  280<H>  01-07    proBNP: Serum Pro-Brain Natriuretic Peptide: 510 pg/mL (01-06 @ 19:43)  Serum Pro-Brain Natriuretic Peptide: 202 pg/mL (12-24 @ 05:41)    Lipid Profile:   HgA1c:   TSH:     < from: Xray Chest 1 View- PORTABLE-Urgent (Xray Chest 1 View- PORTABLE-Urgent .) (12.01.22 @ 18:12) >  IMPRESSION: No acute infiltrate. 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 Patient reported the pain is sharp well localized and occasionally pleuritic. Has chronic cough with bloody or brown sputum. Reports feeling dehydrated. Has not had chemo since 5/2022. Patient denied abdominal pain, N/V/D, fever, urinary symptoms, rash  pt with metastatic bladder ca / onc eval  s/p ppm , sss  chest pain doubt cardiac ?metastatic ca bony mets  dvt prophylaxis  severe protein deficiency, nutrition eval  urology / onc eval, awaiting  gentle hydration  psych eval start Remeron 15 mg po qhs  pain management  PE has been ruled out seveal admissions  dvt prophylaxis

## 2023-01-08 NOTE — PROGRESS NOTE ADULT - ASSESSMENT
M E D I C A L   A T T E N D I N G    F O L L O W    U P   N O T E  (01-08-23 )                                     ------------------------------------------------------------------------------------------------    patient evaluated by me, case discussed with team, chart, medications, and physical exam reviewed, labs / tests  and vitals reviewed by me, as bellow.   Patient is stable for discharge today.  Patient to follow up with  [---]  See discharge document for full note.  [Greater than 35 min spent for these services. ]                             ( note written / Date of service  01-08-23 )    ==================>> MEDICATIONS <<====================    azithromycin   Tablet 500 milliGRAM(s) Oral every 24 hours  cefTRIAXone   IVPB 1000 milliGRAM(s) IV Intermittent every 24 hours  enoxaparin Injectable 40 milliGRAM(s) SubCutaneous every 24 hours  ergocalciferol 88640 Unit(s) Oral <User Schedule>  lactated ringers. 1000 milliLiter(s) IV Continuous <Continuous>  mirtazapine 15 milliGRAM(s) Oral at bedtime  naloxone Injectable 0.4 milliGRAM(s) IV Push once  polyethylene glycol 3350 17 Gram(s) Oral daily  senna 2 Tablet(s) Oral at bedtime    MEDICATIONS  (PRN):  acetaminophen     Tablet .. 325 milliGRAM(s) Oral every 6 hours PRN Mild Pain (1 - 3)  aluminum hydroxide/magnesium hydroxide/simethicone Suspension 30 milliLiter(s) Oral every 4 hours PRN Dyspepsia  melatonin 3 milliGRAM(s) Oral at bedtime PRN Insomnia  ondansetron Injectable 4 milliGRAM(s) IV Push every 8 hours PRN Nausea and/or Vomiting  oxyCODONE    IR 2.5 milliGRAM(s) Oral every 4 hours PRN Moderate Pain (4 - 6)  oxyCODONE    IR 5 milliGRAM(s) Oral every 4 hours PRN Severe Pain (7 - 10)    ___________  Active diet:  Diet, Regular:   Supplement Feeding Modality:  Oral  Ensure Clear Cans or Servings Per Day:  2       Frequency:  Daily  ___________________    ==================>> VITAL SIGNS <<==================    T(C): 36.2 (01-08-23 @ 13:00), Max: 36.2 (01-08-23 @ 13:00)  HR: 67 (01-08-23 @ 13:00) (55 - 67)  BP: 90/59 (01-08-23 @ 13:00) (90/59 - 124/71)  BP(mean): --  RR: 16 (01-08-23 @ 13:00) (14 - 16)  SpO2: 100% (01-08-23 @ 13:00) (98% - 100%)     CAPILLARY BLOOD GLUCOSE        I&O's Summary    07 Jan 2023 07:01  -  08 Jan 2023 07:00  --------------------------------------------------------  IN: 750 mL / OUT: 0 mL / NET: 750 mL       ==================>> LAB AND IMAGING <<==================                        9.6    16.51 )-----------( 316      ( 06 Jan 2023 19:43 )             32.4        01-07    141  |  103  |  35<H>  ----------------------------<  109<H>  3.7   |  21<L>  |  0.97    Ca    13.8<HH>      07 Jan 2023 07:33  Mg     2.0     01-06    TPro  6.8  /  Alb  3.0<L>  /  TBili  0.2  /  DBili  x   /  AST  24  /  ALT  18  /  AlkPhos  280<H>  01-07                     TSH:      1.51   (10-18-22)           Lipid profile:    HgA1C:   WBC count:   16.51 <<== ,  18.35 <<==   Hemoglobin:   9.6 <<==,  9.4 <<==  platelets:  316 <==, 225 <==    Creatinine:  0.97  <<==, 0.99  <<==, 1.04  <<==  Sodium:   141  <==, 138  <==, 139  <==       AST:          24 <== , 26 <==      ALT:        18  <== , 19  <==      AP:        280  <=, 339  <=     Bili:        0.2  <=, 0.4  <=          _________________________________________________________________________________________  ========>>  M E D I C A L   A T T E N D I N G    F O L L O W  U P  N O T E  <<=========  -----------------------------------------------------------------------------------------------------    - Patient seen and examined by me earlier today.   - In summary,  [facility] is a 57y year old woman admitted with chest discomfort   - Patient today overall doing ok, comfortable, eating fairly > encouraged as patient appears very weak     ==================>> REVIEW OF SYSTEM <<=================    GEN: no fever, no chills, chronic pain in back / flank stable / controlled   RESP: no SOB, no cough, no sputum  CVS: no chest pain now reported , no palpitations  GI: no abdominal pain, no nausea, chronic constipation  : no dysuria, no frequency, chronic hematuria, chronic vaginal bleeding   Neuro: no headache, no dizziness  Derm : no itching, no rash    ==================>> PHYSICAL EXAM <<=================    GEN: A&O X 3 , NAD , comfortable, pleasant, calm , cachectic, sitting on bed   HEENT: NCAT, PERRL, MMM, hearing intact, temporal wasting   Neck: supple , no JVD appreciated  CVS: S1S2 , regular , No M/R/G appreciated  PULM: CTA B/L,  no W/R/R appreciated  ABD.: soft. non tender, non distended  Extrem: intact pulses , trace ankle edema , cachectic . + nephrostomy tube with urine draining   PSYCH : flat affect, not anxious                             ( note written / Date of service  01-08-23 )    ==================>> MEDICATIONS <<====================    azithromycin   Tablet 500 milliGRAM(s) Oral every 24 hours  cefTRIAXone   IVPB 1000 milliGRAM(s) IV Intermittent every 24 hours  enoxaparin Injectable 40 milliGRAM(s) SubCutaneous every 24 hours  ergocalciferol 91949 Unit(s) Oral <User Schedule>  lactated ringers. 1000 milliLiter(s) IV Continuous <Continuous>  mirtazapine 15 milliGRAM(s) Oral at bedtime  naloxone Injectable 0.4 milliGRAM(s) IV Push once  polyethylene glycol 3350 17 Gram(s) Oral daily  senna 2 Tablet(s) Oral at bedtime    MEDICATIONS  (PRN):  acetaminophen     Tablet .. 325 milliGRAM(s) Oral every 6 hours PRN Mild Pain (1 - 3)  aluminum hydroxide/magnesium hydroxide/simethicone Suspension 30 milliLiter(s) Oral every 4 hours PRN Dyspepsia  melatonin 3 milliGRAM(s) Oral at bedtime PRN Insomnia  ondansetron Injectable 4 milliGRAM(s) IV Push every 8 hours PRN Nausea and/or Vomiting  oxyCODONE    IR 2.5 milliGRAM(s) Oral every 4 hours PRN Moderate Pain (4 - 6)  oxyCODONE    IR 5 milliGRAM(s) Oral every 4 hours PRN Severe Pain (7 - 10)    ___________  Active diet:  Diet, Regular:   Supplement Feeding Modality:  Oral  Ensure Clear Cans or Servings Per Day:  2       Frequency:  Daily  ___________________    ==================>> VITAL SIGNS <<==================    T(C): 36.2 (01-08-23 @ 13:00), Max: 36.2 (01-08-23 @ 13:00)  HR: 67 (01-08-23 @ 13:00) (55 - 67)  BP: 90/59 (01-08-23 @ 13:00) (90/59 - 124/71)  RR: 16 (01-08-23 @ 13:00) (14 - 16)  SpO2: 100% (01-08-23 @ 13:00) (98% - 100%)     I&O's Summary    07 Jan 2023 07:01  -  08 Jan 2023 07:00  --------------------------------------------------------  IN: 750 mL / OUT: 0 mL / NET: 750 mL     ==================>> LAB AND IMAGING <<==================                        9.6    16.51 )-----------( 316      ( 06 Jan 2023 19:43 )             32.4     WBC count:   16.41 <<== ,  16.51 <<== ,  18.35 <<==     Hemoglobin:   8.9 <<==,  9.6 <<==,  9.4 <<==    01-07    141  |  103  |  35<H>  ----------------------------<  109<H>  3.7   |  21<L>  |  0.97    Ca    13.8<HH>      07 Jan 2023 07:33  Mg     2.0     01-06    TPro  6.8  /  Alb  3.0<L>  /  TBili  0.2  /  DBili  x   /  AST  24  /  ALT  18  /  AlkPhos  280<H>  01-07                 TSH:      1.51   (10-18-22)              AST:          24 <== , 26 <==      ALT:        18  <== , 19  <==      AP:        280  <=, 339  <=     Bili:        0.2  <=, 0.4  <=     TSH:      1.51   (10-18-22)           ___________________________________________________________________________________  ===============>>  A S S E S S M E N T   A N D   P L A N <<===============  ------------------------------------------------------------------------------------------    · Assessment	  58 y/o woman with PMH Asthma, Anemia, Bladder cancer with metastases to the lungs, direct invasion to vagina, on chronic home O2 for comfort, Bradycardia / AF post PPM, Dyslipidemia, Hydronephrosis, R-nephrostomy tube, Liver cyst, Parathyroid tumor, Chronic pain on morphine sulfate at home ( not on hospice) presenting to the ED via EMS from oncologist office for worsening lethargy and intermittent central chest pain. Found to have profound hypercalcemia.     Problem/Plan - 1:  ·  Problem: Hypercalcemia.   ·  Plan: -pt with h/o parathyroid tumor so could be driving process vs Hypercalcemia of malignancy     >> PTH is low so this is likely from malignancy ( and dehydration )   -c/w IV hydration as calcium downtrending   -s/p calcitonin in ED  -heme/onc consult in AM.  - trend CMP     to consider bisphosphonates if not improving..     Problem/Plan - 2:  ·  Problem: Leukocytosis.   ·  Plan: -chronic  however with reported productive cough with pleuritic chest pain and elevated PCT     under treatment empirically for possible PNA ( i belive patient declined to have an xray )!   -trend WBC daily  -c/w ceftriaxone and azithro for now !     Problem/Plan - 3:  ·  Problem: Bladder cancer.   ·  Plan: -no chemo since 05/2022  -heme/onc f/u in AM.    Problem/Plan - 4:  ·  Problem: Prophylactic measure.   ·  Plan: -Diet: Regular  -DVT ppx: Lovenox qd.    --------------------------------------------  Case discussed with patient, staff..   Education given on findings and plan of care  ___________________________  H. NIEVES Ochoa.  Pager: [phone]

## 2023-01-09 NOTE — DIETITIAN NUTRITION RISK NOTIFICATION - TREATMENT: THE FOLLOWING DIET HAS BEEN RECOMMENDED
Diet, Regular:   Nikita(7 Gm Arginine/7 Gm Glut/1.2 Gm HMB     Qty per Day:  1  No Carb Prosource (1pkg = 15gms Protein)     Qty per Day:  1  Supplement Feeding Modality:  Oral  Ensure Plus High Protein Cans or Servings Per Day:  3       Frequency:  Three Times a day (01-08-23 @ 20:05) [Active]

## 2023-01-09 NOTE — DIETITIAN INITIAL EVALUATION ADULT - PERTINENT LABORATORY DATA
01-09    142  |  105  |  34<H>  ----------------------------<  76  3.2<L>   |  20<L>  |  0.85    Ca    12.6<H>      09 Jan 2023 07:13    A1C with Estimated Average Glucose Result: 5.6 % (06-29-22 @ 13:49)  A1C with Estimated Average Glucose Result: 5.7 % (06-28-22 @ 12:08)  A1C with Estimated Average Glucose Result: 5.5 % (01-16-22 @ 18:39)

## 2023-01-09 NOTE — DIETITIAN INITIAL EVALUATION ADULT - PERTINENT MEDS FT
MEDICATIONS  (STANDING):  cefTRIAXone   IVPB 1000 milliGRAM(s) IV Intermittent every 24 hours  enoxaparin Injectable 40 milliGRAM(s) SubCutaneous every 24 hours  lactated ringers. 1000 milliLiter(s) (100 mL/Hr) IV Continuous <Continuous>  mirtazapine 15 milliGRAM(s) Oral at bedtime  naloxone Injectable 0.4 milliGRAM(s) IV Push once  polyethylene glycol 3350 17 Gram(s) Oral daily  senna 2 Tablet(s) Oral at bedtime    MEDICATIONS  (PRN):  acetaminophen     Tablet .. 325 milliGRAM(s) Oral every 6 hours PRN Mild Pain (1 - 3)  aluminum hydroxide/magnesium hydroxide/simethicone Suspension 30 milliLiter(s) Oral every 4 hours PRN Dyspepsia  melatonin 3 milliGRAM(s) Oral at bedtime PRN Insomnia  ondansetron Injectable 4 milliGRAM(s) IV Push every 8 hours PRN Nausea and/or Vomiting  oxyCODONE    IR 2.5 milliGRAM(s) Oral every 4 hours PRN Moderate Pain (4 - 6)  oxyCODONE    IR 5 milliGRAM(s) Oral every 4 hours PRN Severe Pain (7 - 10)

## 2023-01-09 NOTE — DIETITIAN INITIAL EVALUATION ADULT - OTHER INFO
pt reports having thrush  her Ca+ is elevated she is questioning ability to continue on Ensure, which she likes, due to Ca+ levels. RD deferred to provider who will determine after endo has seen pt.

## 2023-01-09 NOTE — CONSULT NOTE ADULT - SUBJECTIVE AND OBJECTIVE BOX
Hematology Consult Note    HPI:  57F PMH Asthma, Anemia, Bladder cancer with  metastases to the lungs on chronic home O2, Bradycardia, Hypertension, Dyslipidemia, Hydronephrosis, R-nephrostomy tube, Liver cyst, Parathyroid tumor, Chronic pain on morphine 2.5 (last took this morning) and PPM in-situ now presenting to the ED via EMS from outpt office for worsening lethargy and intermittent central chest pain. Unable to obtain complete history from pt as she was in significant pain and complained about feeling cold. She mentioned having chronic chest pain however notes this pain in particular started roughly 3-weeks ago described as sharp and pleuritic. She also has a chronic cough productive of reddish/brownish sputum. She reports feeling dehydrated. Has not had chemo since 5/2022. Patient denied abdominal pain, N/V/D, fever, urinary symptoms, rash (06 Jan 2023 23:58).       Onc Hx:      October 8 of 2020, she underwent a retroperitoneal core biopsy of the lymph node which revealed lymphoid tissue, negative for metastatic carcinoma. This was at [facility].  The pathology from September 2021 revealed noninvasive urothelial carcinoma, low-grade with high-grade components. Muscularis propria was not identified in the specimen. There was no lymphovascular invasion. A deep biopsy revealed noninvasive urothelial carcinoma, both low-grade and high-grade components. No lymphovascular invasion was noted.  A consultative report from [facility] dated May 8 of 2020 was a review of slides from the [facility]. This revealed noninvasive urothelial carcinoma with focal squamous features. It was called high-grade papillary. No local invasion was noted. Muscle was not present. A biopsy from the left mid ureter did not reveal any evidence of carcinoma was a very small biopsy. That report only contains 1 page, and there is a bladder tumor biopsy #3 which was read as invasive urothelial carcinoma with focal squamous features. This was a high-grade papillary tumor. It invaded lamina propria focally. Muscularis propria was not identified.  Another pathology review at [facility] was from [facility]. This was a renal pelvis biopsy from the left side. This was read as atypical urothelial proliferation, indefinite for carcinoma.   on February 27 of 2020, the patient underwent a transurethral resection of a bladder tumor and left ureteroscopy/left ureteral stent placement with pathology showing high-grade urothelial cell carcinoma. The patient presented at [facility] on April 13 of 2020 for evaluation. She was initially followed by active surveillance. On July 15 of 2020, a PET/CT showed interval development of posterior bladder wall irregularity, consistent with the biopsied bladder carcinoma. Focal intense activity was noted at the left mid ureter associated with new poor left kidney excretion and increased calyectasis and increasing dilatation of the left renal pelvis. There were new mildly active small left para-aortic lymph nodes adjacent to the proximal ureter. The patient underwent urine cytology which was positive for urothelial carcinoma. On September 22, 2020, a CT urogram showed increasing size of multiple bladder masses with new and increased multiple enhancing lesions in the left renal collecting system and throughout the left ureter consistent with urothelial neoplasms. A lymph node core biopsy was performed on October 8 of 2020, revealing lymphoid tissue which was negative for metastatic carcinoma. She was treated with cisplatin and gemcitabine starting on December 18 of 2020. She was admitted on January 13 of 2021 for the management of right-sided abdominal and back pain with acute kidney injury with a diagnosis of pyelonephritis and a likely prerenal component. The patient presented on January 29 of 2021 with a complaint of gross hematuria. Urine showed Klebsiella. Sonogram revealed increased mild right hydronephrosis. The patient was treated with ceftriaxone. Her nephrostomy tube was replaced and a right-sided nephrostomy tube was placed. The note indicates that she was followed by Dr. Pallavi Hernandez. Her creatinine had increased to 2.3 from a baseline of 1.1-1.2, and the creatinine decreased to 1.7 prior to discharge. No further chemo was given after this. her PCN came out after an accident in June, and the PCN was replaced. She had another PCN change in July at [facility]. She has not been back for [facility] since that time. She apparently only received 3 cycles of treatment.    In 2022 she was admitted anemia, multiple times for UTI, hemoptysis, shortness of breath, worsening chest pain and lower back pain.    10/17/2022 CT chest/abdomen/pelvis showed 1. Large bladder mass is without significant change. Severe chronic hydronephrosis of the left kidney is unchanged. Right nephrostomy tube in place; no right hydronephrosis. 2. Numerous bilateral pulmonary metastases; mild interval increase in size of a few of the largest pulmonary metastases compared to 9/20/2022. 3. Progression of liver metastases.    She has been cachectic and lost a lot of weight, reports sometimes shortness of breath, whitish thrush in the mouth, intermittent pain in the PCN tube site and lower back due to osteoporosis.      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 Her 10/17/2022 CT chest/abdomen/pelvis showed multiple bilateral lung nodules and liver lesions suspicious of metastatic urothelial cell carcinoma. She has poor performance status at 3 on wheel chair and cachexia.        Allergies    No Known Allergies    Intolerances        MEDICATIONS  (STANDING):  cefTRIAXone   IVPB 1000 milliGRAM(s) IV Intermittent every 24 hours  chlorhexidine 2% Cloths 1 Application(s) Topical <User Schedule>  enoxaparin Injectable 40 milliGRAM(s) SubCutaneous every 24 hours  iron sucrose IVPB 200 milliGRAM(s) IV Intermittent once  lactated ringers. 1000 milliLiter(s) (100 mL/Hr) IV Continuous <Continuous>  mirtazapine 15 milliGRAM(s) Oral at bedtime  naloxone Injectable 0.4 milliGRAM(s) IV Push once  polyethylene glycol 3350 17 Gram(s) Oral daily  senna 2 Tablet(s) Oral at bedtime    MEDICATIONS  (PRN):  acetaminophen     Tablet .. 325 milliGRAM(s) Oral every 6 hours PRN Mild Pain (1 - 3)  aluminum hydroxide/magnesium hydroxide/simethicone Suspension 30 milliLiter(s) Oral every 4 hours PRN Dyspepsia  melatonin 3 milliGRAM(s) Oral at bedtime PRN Insomnia  morphine   Solution 5 milliGRAM(s) Oral every 4 hours PRN Severe Pain (7 - 10)  ondansetron Injectable 4 milliGRAM(s) IV Push every 8 hours PRN Nausea and/or Vomiting      PAST MEDICAL & SURGICAL HISTORY:  Anemia      Asthma      HLD (hyperlipidemia)      Pacemaker  [employer]      Bladder cancer      HTN (hypertension)      Parathyroid tumor      Liver cyst      Hydronephrosis  has right Nephrostomy tube - dressing intact      Bradycardia      History of renal calculi      Birthmark      H/O myomectomy      Presence of cardiac pacemaker      H/O hydronephrosis  s/p Right Perc nephrostomy tube placement 02/2021, exchange 06/2021          FAMILY HISTORY:  Family history of prostate cancer (Father)    Family history of CHF (congestive heart failure) (Father)    Family history of atrial fibrillation (Father)        SOCIAL HISTORY: No EtOH, no tobacco    REVIEW OF SYSTEMS:    CONSTITUTIONAL: c/o severe weakness  EYES/ENT: No visual changes;  No vertigo or throat pain   NECK: No pain or stiffness  RESPIRATORY: c/o SOB anni with exertion   CARDIOVASCULAR: No chest pain or palpitations  GASTROINTESTINAL: c/o loss of anorexia   GENITOURINARY: No dysuria, frequency or hematuria  NEUROLOGICAL: No numbness or weakness  SKIN: c/o pain in buttock stated "wound present"  All other review of systems is negative unless indicated above.        T(F): 97.3 (01-09-23 @ 05:17), Max: 97.3 (01-08-23 @ 20:48)  HR: 61 (01-09-23 @ 05:17)  BP: 98/62 (01-09-23 @ 05:17)  RR: 18 (01-09-23 @ 05:17)  SpO2: 98% (01-09-23 @ 05:17)  Wt(kg): --    GENERAL: Cachetic Female,  HEAD:  Atraumatic, Normocephalic  EYES: Dry mucous membranes   CHEST/LUNG: Clear to auscultation bilaterally; No wheeze  HEART: Regular rate and rhythm; No murmurs, rubs, or gallops  ABDOMEN: Soft, Nontender, Nondistended; Bowel sounds present  EXTREMITIES:  2+ Peripheral Pulses, No clubbing, cyanosis, or edema  NEUROLOGY: A/Ox4, extremely weak   SKIN: patient stated she had a wound on buttock but unable to visualize                           8.7    15.88 )-----------( 232      ( 09 Jan 2023 07:19 )             28.9       01-09    142  |  105  |  34<H>  ----------------------------<  76  3.2<L>   |  20<L>  |  0.85    Ca    12.6<H>      09 Jan 2023 07:13             Hematology Consult Note    HPI:  57F PMH Asthma, Anemia, Bladder cancer with  metastases to the lungs on chronic home O2, Bradycardia, Hypertension, Dyslipidemia, Hydronephrosis, R-nephrostomy tube, Liver cyst, Parathyroid tumor, Chronic pain on morphine 2.5 (last took this morning) and PPM in-situ now presenting to the ED via EMS from outpt office for worsening lethargy and intermittent central chest pain. Unable to obtain complete history from pt as she was in significant pain and complained about feeling cold. She mentioned having chronic chest pain however notes this pain in particular started roughly 3-weeks ago described as sharp and pleuritic. She also has a chronic cough productive of reddish/brownish sputum. She reports feeling dehydrated. Has not had chemo since 5/2022. Patient denied abdominal pain, N/V/D, fever, urinary symptoms, rash (06 Jan 2023 23:58).       Onc Hx:      October 8 of 2020, she underwent a retroperitoneal core biopsy of the lymph node which revealed lymphoid tissue, negative for metastatic carcinoma. This was at [facility].  The pathology from September 2021 revealed noninvasive urothelial carcinoma, low-grade with high-grade components. Muscularis propria was not identified in the specimen. There was no lymphovascular invasion. A deep biopsy revealed noninvasive urothelial carcinoma, both low-grade and high-grade components. No lymphovascular invasion was noted.  A consultative report from [facility] dated May 8 of 2020 was a review of slides from the [facility]. This revealed noninvasive urothelial carcinoma with focal squamous features. It was called high-grade papillary. No local invasion was noted. Muscle was not present. A biopsy from the left mid ureter did not reveal any evidence of carcinoma was a very small biopsy. That report only contains 1 page, and there is a bladder tumor biopsy #3 which was read as invasive urothelial carcinoma with focal squamous features. This was a high-grade papillary tumor. It invaded lamina propria focally. Muscularis propria was not identified.  Another pathology review at [facility] was from [facility]. This was a renal pelvis biopsy from the left side. This was read as atypical urothelial proliferation, indefinite for carcinoma.   on February 27 of 2020, the patient underwent a transurethral resection of a bladder tumor and left ureteroscopy/left ureteral stent placement with pathology showing high-grade urothelial cell carcinoma. The patient presented at [facility] on April 13 of 2020 for evaluation. She was initially followed by active surveillance. On July 15 of 2020, a PET/CT showed interval development of posterior bladder wall irregularity, consistent with the biopsied bladder carcinoma. Focal intense activity was noted at the left mid ureter associated with new poor left kidney excretion and increased calyectasis and increasing dilatation of the left renal pelvis. There were new mildly active small left para-aortic lymph nodes adjacent to the proximal ureter. The patient underwent urine cytology which was positive for urothelial carcinoma. On September 22, 2020, a CT urogram showed increasing size of multiple bladder masses with new and increased multiple enhancing lesions in the left renal collecting system and throughout the left ureter consistent with urothelial neoplasms. A lymph node core biopsy was performed on October 8 of 2020, revealing lymphoid tissue which was negative for metastatic carcinoma. She was treated with cisplatin and gemcitabine starting on December 18 of 2020. She was admitted on January 13 of 2021 for the management of right-sided abdominal and back pain with acute kidney injury with a diagnosis of pyelonephritis and a likely prerenal component. The patient presented on January 29 of 2021 with a complaint of gross hematuria. Urine showed Klebsiella. Sonogram revealed increased mild right hydronephrosis. The patient was treated with ceftriaxone. Her nephrostomy tube was replaced and a right-sided nephrostomy tube was placed. The note indicates that she was followed by Dr. Pallavi Hernandez. Her creatinine had increased to 2.3 from a baseline of 1.1-1.2, and the creatinine decreased to 1.7 prior to discharge. No further chemo was given after this. her PCN came out after an accident in June, and the PCN was replaced. She had another PCN change in July at [facility]. She has not been back for [facility] since that time. She apparently only received 3 cycles of treatment.    In 2022 she was admitted anemia, multiple times for UTI, hemoptysis, shortness of breath, worsening chest pain and lower back pain.    10/17/2022 CT chest/abdomen/pelvis showed 1. Large bladder mass is without significant change. Severe chronic hydronephrosis of the left kidney is unchanged. Right nephrostomy tube in place; no right hydronephrosis. 2. Numerous bilateral pulmonary metastases; mild interval increase in size of a few of the largest pulmonary metastases compared to 9/20/2022. 3. Progression of liver metastases.    She has been cachectic and lost a lot of weight, reports sometimes shortness of breath, whitish thrush in the mouth, intermittent pain in the PCN tube site and lower back due to osteoporosis.      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 Her 10/17/2022 CT chest/abdomen/pelvis showed multiple bilateral lung nodules and liver lesions suspicious of metastatic urothelial cell carcinoma. She has poor performance status at 3 on wheel chair and cachexia.        Allergies    No Known Allergies    Intolerances        MEDICATIONS  (STANDING):  cefTRIAXone   IVPB 1000 milliGRAM(s) IV Intermittent every 24 hours  chlorhexidine 2% Cloths 1 Application(s) Topical <User Schedule>  enoxaparin Injectable 40 milliGRAM(s) SubCutaneous every 24 hours  iron sucrose IVPB 200 milliGRAM(s) IV Intermittent once  lactated ringers. 1000 milliLiter(s) (100 mL/Hr) IV Continuous <Continuous>  mirtazapine 15 milliGRAM(s) Oral at bedtime  naloxone Injectable 0.4 milliGRAM(s) IV Push once  polyethylene glycol 3350 17 Gram(s) Oral daily  senna 2 Tablet(s) Oral at bedtime    MEDICATIONS  (PRN):  acetaminophen     Tablet .. 325 milliGRAM(s) Oral every 6 hours PRN Mild Pain (1 - 3)  aluminum hydroxide/magnesium hydroxide/simethicone Suspension 30 milliLiter(s) Oral every 4 hours PRN Dyspepsia  melatonin 3 milliGRAM(s) Oral at bedtime PRN Insomnia  morphine   Solution 5 milliGRAM(s) Oral every 4 hours PRN Severe Pain (7 - 10)  ondansetron Injectable 4 milliGRAM(s) IV Push every 8 hours PRN Nausea and/or Vomiting      PAST MEDICAL & SURGICAL HISTORY:  Anemia      Asthma      HLD (hyperlipidemia)      Pacemaker  [employer]      Bladder cancer      HTN (hypertension)      Parathyroid tumor      Liver cyst      Hydronephrosis  has right Nephrostomy tube - dressing intact      Bradycardia      History of renal calculi      Birthmark      H/O myomectomy      Presence of cardiac pacemaker      H/O hydronephrosis  s/p Right Perc nephrostomy tube placement 02/2021, exchange 06/2021          FAMILY HISTORY:  Family history of prostate cancer (Father)    Family history of CHF (congestive heart failure) (Father)    Family history of atrial fibrillation (Father)        SOCIAL HISTORY: No EtOH, no tobacco    REVIEW OF SYSTEMS:    CONSTITUTIONAL: c/o severe weakness  EYES/ENT: No visual changes;  No vertigo or throat pain   NECK: No pain or stiffness  RESPIRATORY: c/o SOB anni with exertion   CARDIOVASCULAR: No chest pain or palpitations  GASTROINTESTINAL: c/o loss of anorexia   GENITOURINARY: No dysuria, frequency or hematuria  NEUROLOGICAL: No numbness or weakness  SKIN: c/o pain in buttock stated "wound present"  All other review of systems is negative unless indicated above.        T(F): 97.3 (01-09-23 @ 05:17), Max: 97.3 (01-08-23 @ 20:48)  HR: 61 (01-09-23 @ 05:17)  BP: 98/62 (01-09-23 @ 05:17)  RR: 18 (01-09-23 @ 05:17)  SpO2: 98% (01-09-23 @ 05:17)  Wt(kg): --    GENERAL: Cachetic Female,  HEAD:  Atraumatic, Normocephalic  EYES: Dry mucous membranes   CHEST/LUNG: Clear to auscultation bilaterally; No wheeze  HEART: Regular rate and rhythm; No murmurs, rubs, or gallops  ABDOMEN: Soft, Nontender, Nondistended; Bowel sounds present  EXTREMITIES:  2+ Peripheral Pulses, No clubbing, cyanosis, or edema  NEUROLOGY: A/Ox4, extremely weak   SKIN: patient stated she had a wound on buttock but unable to visualize                           8.7    15.88 )-----------( 232      ( 09 Jan 2023 07:19 )             28.9       01-09    142  |  105  |  34<H>  ----------------------------<  76  3.2<L>   |  20<L>  |  0.85    Ca    12.6<H>      09 Jan 2023 07:13      < from: CT Abdomen and Pelvis No Cont (10.17.22 @ 17:54) >   CT CHEST                        ACC: [id number] EXAM:  CT ABDOMEN AND PELVIS                          PROCEDURE DATE:  10/17/2022          INTERPRETATION:  CLINICAL INFORMATION: Left hip and left back pain.   Concern for metastasis or stones.    COMPARISON: CT chest, abdomen and pelvis from 9/20/2022.    CONTRAST/COMPLICATIONS:  IV Contrast: None.  Oral Contrast: None.  Complications: None reported.    PROCEDURE:  CT of the Chest, Abdomen and Pelvis was performed.  Sagittal and coronal reformats were performed.    FINDINGS:  CHEST:  LUNGS AND LARGE AIRWAYS: Redemonstration of innumerable bilateral   pulmonary nodules consistent with metastases. A few of the largest   pulmonary nodules are mildly increased in size. For example, left lower   lobe nodule measures 3.3 x 2.3 cm (2:116), previously 2.8 x 2.0 cm on   9/20/2022 . A right lower lobe nodule measures 2.4 x 2.2 cm (2:103),   previously 2.2 x 2.1 cm.  PLEURA: No pleural effusion.  VESSELS: Within normal limits.  HEART: Heart size is normal. Small pericardial effusion.  MEDIASTINUM AND FLORENTIN: No lymphadenopathy.  CHEST WALL AND LOWER NECK: Left chest wall pacemaker with leads in the   right atrium and ventricle.    ABDOMEN AND PELVIS:  LIVER: Interval increase in size of hepatic metastases. For reference,   segment 7 mass measures 4.0 x 2.3 cm (2:148), previously 3.3 x 2.2 cm.  BILE DUCTS: Normal caliber.  GALLBLADDER: Within normal limits.  SPLEEN: Within normal limits.  PANCREAS: Within normal limits.  ADRENALS: Within normal limits.  KIDNEYS/URETERS: Right percutaneous nephrostomy catheter; no right   hydronephrosis. 0.4 cm nonobstructing stone in the right lower pole. Left   kidney is enlarged with severe hydroureteronephrosis and markedly dilated   calyces, unchanged. There are nonobstructive stones of the left kidney.    BLADDER: Large bladder mass is without significant change.  REPRODUCTIVE ORGANS: Uterus and adnexa within normal limits.    BOWEL: No bowel obstruction. Appendix is normal.  PERITONEUM: No ascites.  VESSELS: Within normal limits.  RETROPERITONEUM/LYMPH NODES: Retroperitoneal and pelvic lymphadenopathy   is unchanged.  ABDOMINAL WALL: Within normal limits.  BONES: No evidence of osseous metastatic disease.    IMPRESSION:  1. Large bladder mass is without significant change. Severe chronic   hydronephrosis of the left kidney is unchanged. Right nephrostomy tube in   place; no right hydronephrosis.  2.  Numerous bilateral pulmonary metastases; mild interval increase in   size of a few of the largest pulmonary metastases compared to 9/20/2022.  3.  Progression of liver metastases.  4.  No evidence of osseous metastatic disease.        < end of copied text >           Hematology Consult Note    HPI:  57F PMH Asthma, Anemia, Bladder cancer with  metastases to the lungs on chronic home O2, Bradycardia, Hypertension, Dyslipidemia, Hydronephrosis, R-nephrostomy tube, Liver cyst, Parathyroid tumor, Chronic pain on morphine 2.5 (last took this morning) and PPM in-situ now presenting to the ED via EMS from outpt office for worsening lethargy and intermittent central chest pain. Unable to obtain complete history from pt as she was in significant pain and complained about feeling cold. She mentioned having chronic chest pain however notes this pain in particular started roughly 3-weeks ago described as sharp and pleuritic. She also has a chronic cough productive of reddish/brownish sputum. She reports feeling dehydrated. Has not had chemo since 5/2022. Patient denied abdominal pain, N/V/D, fever, urinary symptoms, rash (06 Jan 2023 23:58).       Onc Hx:      October 8 of 2020, she underwent a retroperitoneal core biopsy of the lymph node which revealed lymphoid tissue, negative for metastatic carcinoma. This was at [facility].  The pathology from September 2021 revealed noninvasive urothelial carcinoma, low-grade with high-grade components. Muscularis propria was not identified in the specimen. There was no lymphovascular invasion. A deep biopsy revealed noninvasive urothelial carcinoma, both low-grade and high-grade components. No lymphovascular invasion was noted.  A consultative report from [facility] dated May 8 of 2020 was a review of slides from the [facility]. This revealed noninvasive urothelial carcinoma with focal squamous features. It was called high-grade papillary. No local invasion was noted. Muscle was not present. A biopsy from the left mid ureter did not reveal any evidence of carcinoma was a very small biopsy. That report only contains 1 page, and there is a bladder tumor biopsy #3 which was read as invasive urothelial carcinoma with focal squamous features. This was a high-grade papillary tumor. It invaded lamina propria focally. Muscularis propria was not identified.  Another pathology review at [facility] was from [facility]. This was a renal pelvis biopsy from the left side. This was read as atypical urothelial proliferation, indefinite for carcinoma.   on February 27 of 2020, the patient underwent a transurethral resection of a bladder tumor and left ureteroscopy/left ureteral stent placement with pathology showing high-grade urothelial cell carcinoma. The patient presented at [facility] on April 13 of 2020 for evaluation. She was initially followed by active surveillance. On July 15 of 2020, a PET/CT showed interval development of posterior bladder wall irregularity, consistent with the biopsied bladder carcinoma. Focal intense activity was noted at the left mid ureter associated with new poor left kidney excretion and increased calyectasis and increasing dilatation of the left renal pelvis. There were new mildly active small left para-aortic lymph nodes adjacent to the proximal ureter. The patient underwent urine cytology which was positive for urothelial carcinoma. On September 22, 2020, a CT urogram showed increasing size of multiple bladder masses with new and increased multiple enhancing lesions in the left renal collecting system and throughout the left ureter consistent with urothelial neoplasms. A lymph node core biopsy was performed on October 8 of 2020, revealing lymphoid tissue which was negative for metastatic carcinoma. She was treated with cisplatin and gemcitabine starting on December 18 of 2020. She was admitted on January 13 of 2021 for the management of right-sided abdominal and back pain with acute kidney injury with a diagnosis of pyelonephritis and a likely prerenal component. The patient presented on January 29 of 2021 with a complaint of gross hematuria. Urine showed Klebsiella. Sonogram revealed increased mild right hydronephrosis. The patient was treated with ceftriaxone. Her nephrostomy tube was replaced and a right-sided nephrostomy tube was placed. The note indicates that she was followed by Dr. Pallavi Hernandez. Her creatinine had increased to 2.3 from a baseline of 1.1-1.2, and the creatinine decreased to 1.7 prior to discharge. No further chemo was given after this. her PCN came out after an accident in June, and the PCN was replaced. She had another PCN change in July at [facility]. She has not been back for [facility] since that time. She apparently only received 3 cycles of treatment.    In 2022 she was admitted anemia, multiple times for UTI, hemoptysis, shortness of breath, worsening chest pain and lower back pain.    10/17/2022 CT chest/abdomen/pelvis showed 1. Large bladder mass is without significant change. Severe chronic hydronephrosis of the left kidney is unchanged. Right nephrostomy tube in place; no right hydronephrosis. 2. Numerous bilateral pulmonary metastases; mild interval increase in size of a few of the largest pulmonary metastases compared to 9/20/2022. 3. Progression of liver metastases.    She has been cachectic and lost a lot of weight, reports sometimes shortness of breath, whitish thrush in the mouth, intermittent pain in the PCN tube site and lower back due to osteoporosis.      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 Her 10/17/2022 CT chest/abdomen/pelvis showed multiple bilateral lung nodules and liver lesions suspicious of metastatic urothelial cell carcinoma. She has poor performance status at 3 on wheel chair and cachexia.        Allergies    No Known Allergies    Intolerances    MEDICATIONS  (STANDING):  cefTRIAXone   IVPB 1000 milliGRAM(s) IV Intermittent every 24 hours  chlorhexidine 2% Cloths 1 Application(s) Topical <User Schedule>  enoxaparin Injectable 40 milliGRAM(s) SubCutaneous every 24 hours  iron sucrose IVPB 200 milliGRAM(s) IV Intermittent once  lactated ringers. 1000 milliLiter(s) (100 mL/Hr) IV Continuous <Continuous>  mirtazapine 15 milliGRAM(s) Oral at bedtime  naloxone Injectable 0.4 milliGRAM(s) IV Push once  polyethylene glycol 3350 17 Gram(s) Oral daily  senna 2 Tablet(s) Oral at bedtime    MEDICATIONS  (PRN):  acetaminophen     Tablet .. 325 milliGRAM(s) Oral every 6 hours PRN Mild Pain (1 - 3)  aluminum hydroxide/magnesium hydroxide/simethicone Suspension 30 milliLiter(s) Oral every 4 hours PRN Dyspepsia  melatonin 3 milliGRAM(s) Oral at bedtime PRN Insomnia  morphine   Solution 5 milliGRAM(s) Oral every 4 hours PRN Severe Pain (7 - 10)  ondansetron Injectable 4 milliGRAM(s) IV Push every 8 hours PRN Nausea and/or Vomiting      PAST MEDICAL & SURGICAL HISTORY:  Anemia  Asthma  HLD (hyperlipidemia)  Pacemaker  [employer]  Bladder cancer  HTN (hypertension)  Parathyroid tumor  Liver cyst  Hydronephrosis  has right Nephrostomy tube - dressing intact  Bradycardia  History of renal calculi  Birthmark  H/O myomectomy  Presence of cardiac pacemaker  H/O hydronephrosis  s/p Right Perc nephrostomy tube placement 02/2021, exchange 06/2021          FAMILY HISTORY:  Family history of prostate cancer (Father)    Family history of CHF (congestive heart failure) (Father)    Family history of atrial fibrillation (Father)        SOCIAL HISTORY: No EtOH, no tobacco    REVIEW OF SYSTEMS:    CONSTITUTIONAL: c/o severe weakness  EYES/ENT: No visual changes;  No vertigo or throat pain   NECK: No pain or stiffness  RESPIRATORY: c/o SOB anni with exertion   CARDIOVASCULAR: No chest pain or palpitations  GASTROINTESTINAL: c/o loss of anorexia   GENITOURINARY: No dysuria, frequency or hematuria  NEUROLOGICAL: No numbness or weakness  SKIN: c/o pain in buttock stated "wound present"  All other review of systems is negative unless indicated above.        T(F): 97.3 (01-09-23 @ 05:17), Max: 97.3 (01-08-23 @ 20:48)  HR: 61 (01-09-23 @ 05:17)  BP: 98/62 (01-09-23 @ 05:17)  RR: 18 (01-09-23 @ 05:17)  SpO2: 98% (01-09-23 @ 05:17)  Wt(kg): --    GENERAL: Cachetic Female,  HEAD:  Atraumatic, Normocephalic  EYES: Dry mucous membranes   CHEST/LUNG: Clear to auscultation bilaterally; No wheeze  HEART: Regular rate and rhythm; No murmurs, rubs, or gallops  ABDOMEN: Soft, Nontender, Nondistended; Bowel sounds present  EXTREMITIES:  2+ Peripheral Pulses, No clubbing, cyanosis, or edema  NEUROLOGY: A/Ox4, extremely weak   SKIN: patient stated she had a wound on buttock but unable to visualize                           8.7    15.88 )-----------( 232      ( 09 Jan 2023 07:19 )             28.9       01-09    142  |  105  |  34<H>  ----------------------------<  76  3.2<L>   |  20<L>  |  0.85    Ca    12.6<H>      09 Jan 2023 07:13      < from: CT Abdomen and Pelvis No Cont (10.17.22 @ 17:54) >   CT CHEST                        ACC: [id number] EXAM:  CT ABDOMEN AND PELVIS                          PROCEDURE DATE:  10/17/2022          INTERPRETATION:  CLINICAL INFORMATION: Left hip and left back pain.   Concern for metastasis or stones.    COMPARISON: CT chest, abdomen and pelvis from 9/20/2022.    CONTRAST/COMPLICATIONS:  IV Contrast: None.  Oral Contrast: None.  Complications: None reported.    PROCEDURE:  CT of the Chest, Abdomen and Pelvis was performed.  Sagittal and coronal reformats were performed.    FINDINGS:  CHEST:  LUNGS AND LARGE AIRWAYS: Redemonstration of innumerable bilateral   pulmonary nodules consistent with metastases. A few of the largest   pulmonary nodules are mildly increased in size. For example, left lower   lobe nodule measures 3.3 x 2.3 cm (2:116), previously 2.8 x 2.0 cm on   9/20/2022 . A right lower lobe nodule measures 2.4 x 2.2 cm (2:103),   previously 2.2 x 2.1 cm.  PLEURA: No pleural effusion.  VESSELS: Within normal limits.  HEART: Heart size is normal. Small pericardial effusion.  MEDIASTINUM AND FLORENTIN: No lymphadenopathy.  CHEST WALL AND LOWER NECK: Left chest wall pacemaker with leads in the   right atrium and ventricle.    ABDOMEN AND PELVIS:  LIVER: Interval increase in size of hepatic metastases. For reference,   segment 7 mass measures 4.0 x 2.3 cm (2:148), previously 3.3 x 2.2 cm.  BILE DUCTS: Normal caliber.  GALLBLADDER: Within normal limits.  SPLEEN: Within normal limits.  PANCREAS: Within normal limits.  ADRENALS: Within normal limits.  KIDNEYS/URETERS: Right percutaneous nephrostomy catheter; no right   hydronephrosis. 0.4 cm nonobstructing stone in the right lower pole. Left   kidney is enlarged with severe hydroureteronephrosis and markedly dilated   calyces, unchanged. There are nonobstructive stones of the left kidney.    BLADDER: Large bladder mass is without significant change.  REPRODUCTIVE ORGANS: Uterus and adnexa within normal limits.    BOWEL: No bowel obstruction. Appendix is normal.  PERITONEUM: No ascites.  VESSELS: Within normal limits.  RETROPERITONEUM/LYMPH NODES: Retroperitoneal and pelvic lymphadenopathy   is unchanged.  ABDOMINAL WALL: Within normal limits.  BONES: No evidence of osseous metastatic disease.    IMPRESSION:  1. Large bladder mass is without significant change. Severe chronic   hydronephrosis of the left kidney is unchanged. Right nephrostomy tube in   place; no right hydronephrosis.  2.  Numerous bilateral pulmonary metastases; mild interval increase in   size of a few of the largest pulmonary metastases compared to 9/20/2022.  3.  Progression of liver metastases.  4.  No evidence of osseous metastatic disease.        < end of copied text >           Hematology Consult Note    HPI:  57F PMH Asthma, Anemia, Bladder cancer with  metastases to the lungs on chronic home O2, Bradycardia, Hypertension, Dyslipidemia, Hydronephrosis, R-nephrostomy tube, Liver cyst, Parathyroid tumor, Chronic pain on morphine 2.5 (last took this morning) and PPM in-situ now presenting to the ED via EMS from outpt office for worsening lethargy and intermittent central chest pain. Unable to obtain complete history from pt as she was in significant pain and complained about feeling cold. She mentioned having chronic chest pain however notes this pain in particular started roughly 3-weeks ago described as sharp and pleuritic. She also has a chronic cough productive of reddish/brownish sputum. She reports feeling dehydrated. Has not had chemo since 5/2022. Patient denied abdominal pain, N/V/D, fever, urinary symptoms, rash (06 Jan 2023 23:58).       Onc Hx:      October 8 of 2020, she underwent a retroperitoneal core biopsy of the lymph node which revealed lymphoid tissue, negative for metastatic carcinoma. This was at [facility].  The pathology from September 2021 revealed noninvasive urothelial carcinoma, low-grade with high-grade components. Muscularis propria was not identified in the specimen. There was no lymphovascular invasion. A deep biopsy revealed noninvasive urothelial carcinoma, both low-grade and high-grade components. No lymphovascular invasion was noted.  A consultative report from [facility] dated May 8 of 2020 was a review of slides from the [facility]. This revealed noninvasive urothelial carcinoma with focal squamous features. It was called high-grade papillary. No local invasion was noted. Muscle was not present. A biopsy from the left mid ureter did not reveal any evidence of carcinoma was a very small biopsy. That report only contains 1 page, and there is a bladder tumor biopsy #3 which was read as invasive urothelial carcinoma with focal squamous features. This was a high-grade papillary tumor. It invaded lamina propria focally. Muscularis propria was not identified.  Another pathology review at [facility] was from [facility]. This was a renal pelvis biopsy from the left side. This was read as atypical urothelial proliferation, indefinite for carcinoma.   on February 27 of 2020, the patient underwent a transurethral resection of a bladder tumor and left ureteroscopy/left ureteral stent placement with pathology showing high-grade urothelial cell carcinoma. The patient presented at [facility] on April 13 of 2020 for evaluation. She was initially followed by active surveillance. On July 15 of 2020, a PET/CT showed interval development of posterior bladder wall irregularity, consistent with the biopsied bladder carcinoma. Focal intense activity was noted at the left mid ureter associated with new poor left kidney excretion and increased calyectasis and increasing dilatation of the left renal pelvis. There were new mildly active small left para-aortic lymph nodes adjacent to the proximal ureter. The patient underwent urine cytology which was positive for urothelial carcinoma. On September 22, 2020, a CT urogram showed increasing size of multiple bladder masses with new and increased multiple enhancing lesions in the left renal collecting system and throughout the left ureter consistent with urothelial neoplasms. A lymph node core biopsy was performed on October 8 of 2020, revealing lymphoid tissue which was negative for metastatic carcinoma. She was treated with cisplatin and gemcitabine starting on December 18 of 2020. She was admitted on January 13 of 2021 for the management of right-sided abdominal and back pain with acute kidney injury with a diagnosis of pyelonephritis and a likely prerenal component. The patient presented on January 29 of 2021 with a complaint of gross hematuria. Urine showed Klebsiella. Sonogram revealed increased mild right hydronephrosis. The patient was treated with ceftriaxone. Her nephrostomy tube was replaced and a right-sided nephrostomy tube was placed. The note indicates that she was followed by Dr. Pallavi Hernandez. Her creatinine had increased to 2.3 from a baseline of 1.1-1.2, and the creatinine decreased to 1.7 prior to discharge. No further chemo was given after this. her PCN came out after an accident in June, and the PCN was replaced. She had another PCN change in July at [facility]. She has not been back for [facility] since that time. She apparently only received 3 cycles of treatment.    In 2022 she was admitted anemia, multiple times for UTI, hemoptysis, shortness of breath, worsening chest pain and lower back pain.    10/17/2022 CT chest/abdomen/pelvis showed 1. Large bladder mass is without significant change. Severe chronic hydronephrosis of the left kidney is unchanged. Right nephrostomy tube in place; no right hydronephrosis. 2. Numerous bilateral pulmonary metastases; mild interval increase in size of a few of the largest pulmonary metastases compared to 9/20/2022. 3. Progression of liver metastases.    She has been cachectic and lost a lot of weight, reports sometimes shortness of breath, whitish thrush in the mouth, intermittent pain in the PCN tube site and lower back due to osteoporosis.        Allergies    No Known Allergies    Intolerances    MEDICATIONS  (STANDING):  cefTRIAXone   IVPB 1000 milliGRAM(s) IV Intermittent every 24 hours  chlorhexidine 2% Cloths 1 Application(s) Topical <User Schedule>  enoxaparin Injectable 40 milliGRAM(s) SubCutaneous every 24 hours  iron sucrose IVPB 200 milliGRAM(s) IV Intermittent once  lactated ringers. 1000 milliLiter(s) (100 mL/Hr) IV Continuous <Continuous>  mirtazapine 15 milliGRAM(s) Oral at bedtime  naloxone Injectable 0.4 milliGRAM(s) IV Push once  polyethylene glycol 3350 17 Gram(s) Oral daily  senna 2 Tablet(s) Oral at bedtime    MEDICATIONS  (PRN):  acetaminophen     Tablet .. 325 milliGRAM(s) Oral every 6 hours PRN Mild Pain (1 - 3)  aluminum hydroxide/magnesium hydroxide/simethicone Suspension 30 milliLiter(s) Oral every 4 hours PRN Dyspepsia  melatonin 3 milliGRAM(s) Oral at bedtime PRN Insomnia  morphine   Solution 5 milliGRAM(s) Oral every 4 hours PRN Severe Pain (7 - 10)  ondansetron Injectable 4 milliGRAM(s) IV Push every 8 hours PRN Nausea and/or Vomiting      PAST MEDICAL & SURGICAL HISTORY:  Anemia  Asthma  HLD (hyperlipidemia)  Pacemaker  [employer]  Bladder cancer  HTN (hypertension)  Parathyroid tumor  Liver cyst  Hydronephrosis  has right Nephrostomy tube - dressing intact  Bradycardia  History of renal calculi  Birthmark  H/O myomectomy  Presence of cardiac pacemaker  H/O hydronephrosis  s/p Right Perc nephrostomy tube placement 02/2021, exchange 06/2021          FAMILY HISTORY:  Family history of prostate cancer (Father)    Family history of CHF (congestive heart failure) (Father)    Family history of atrial fibrillation (Father)        SOCIAL HISTORY: No EtOH, no tobacco    REVIEW OF SYSTEMS:    CONSTITUTIONAL: c/o severe weakness  EYES/ENT: No visual changes;  No vertigo or throat pain   NECK: No pain or stiffness  RESPIRATORY: c/o SOB anni with exertion   CARDIOVASCULAR: No chest pain or palpitations  GASTROINTESTINAL: c/o loss of anorexia   GENITOURINARY: No dysuria, frequency or hematuria  NEUROLOGICAL: No numbness or weakness  SKIN: c/o pain in buttock stated "wound present"  All other review of systems is negative unless indicated above.        T(F): 97.3 (01-09-23 @ 05:17), Max: 97.3 (01-08-23 @ 20:48)  HR: 61 (01-09-23 @ 05:17)  BP: 98/62 (01-09-23 @ 05:17)  RR: 18 (01-09-23 @ 05:17)  SpO2: 98% (01-09-23 @ 05:17)  Wt(kg): --    GENERAL: Cachetic Female,  HEAD:  Atraumatic, Normocephalic  EYES: Dry mucous membranes   CHEST/LUNG: Clear to auscultation bilaterally; No wheeze  HEART: Regular rate and rhythm; No murmurs, rubs, or gallops  ABDOMEN: Soft, Nontender, Nondistended; Bowel sounds present  EXTREMITIES:  2+ Peripheral Pulses, No clubbing, cyanosis, or edema  NEUROLOGY: A/Ox4, extremely weak   SKIN: patient stated she had a wound on buttock but unable to visualize                           8.7    15.88 )-----------( 232      ( 09 Jan 2023 07:19 )             28.9       01-09    142  |  105  |  34<H>  ----------------------------<  76  3.2<L>   |  20<L>  |  0.85    Ca    12.6<H>      09 Jan 2023 07:13      < from: CT Abdomen and Pelvis No Cont (10.17.22 @ 17:54) >   CT CHEST                        ACC: [id number] EXAM:  CT ABDOMEN AND PELVIS                          PROCEDURE DATE:  10/17/2022          INTERPRETATION:  CLINICAL INFORMATION: Left hip and left back pain.   Concern for metastasis or stones.    COMPARISON: CT chest, abdomen and pelvis from 9/20/2022.    CONTRAST/COMPLICATIONS:  IV Contrast: None.  Oral Contrast: None.  Complications: None reported.    PROCEDURE:  CT of the Chest, Abdomen and Pelvis was performed.  Sagittal and coronal reformats were performed.    FINDINGS:  CHEST:  LUNGS AND LARGE AIRWAYS: Redemonstration of innumerable bilateral   pulmonary nodules consistent with metastases. A few of the largest   pulmonary nodules are mildly increased in size. For example, left lower   lobe nodule measures 3.3 x 2.3 cm (2:116), previously 2.8 x 2.0 cm on   9/20/2022 . A right lower lobe nodule measures 2.4 x 2.2 cm (2:103),   previously 2.2 x 2.1 cm.  PLEURA: No pleural effusion.  VESSELS: Within normal limits.  HEART: Heart size is normal. Small pericardial effusion.  MEDIASTINUM AND FLORENTIN: No lymphadenopathy.  CHEST WALL AND LOWER NECK: Left chest wall pacemaker with leads in the   right atrium and ventricle.    ABDOMEN AND PELVIS:  LIVER: Interval increase in size of hepatic metastases. For reference,   segment 7 mass measures 4.0 x 2.3 cm (2:148), previously 3.3 x 2.2 cm.  BILE DUCTS: Normal caliber.  GALLBLADDER: Within normal limits.  SPLEEN: Within normal limits.  PANCREAS: Within normal limits.  ADRENALS: Within normal limits.  KIDNEYS/URETERS: Right percutaneous nephrostomy catheter; no right   hydronephrosis. 0.4 cm nonobstructing stone in the right lower pole. Left   kidney is enlarged with severe hydroureteronephrosis and markedly dilated   calyces, unchanged. There are nonobstructive stones of the left kidney.    BLADDER: Large bladder mass is without significant change.  REPRODUCTIVE ORGANS: Uterus and adnexa within normal limits.    BOWEL: No bowel obstruction. Appendix is normal.  PERITONEUM: No ascites.  VESSELS: Within normal limits.  RETROPERITONEUM/LYMPH NODES: Retroperitoneal and pelvic lymphadenopathy   is unchanged.  ABDOMINAL WALL: Within normal limits.  BONES: No evidence of osseous metastatic disease.    IMPRESSION:  1. Large bladder mass is without significant change. Severe chronic   hydronephrosis of the left kidney is unchanged. Right nephrostomy tube in   place; no right hydronephrosis.  2.  Numerous bilateral pulmonary metastases; mild interval increase in   size of a few of the largest pulmonary metastases compared to 9/20/2022.  3.  Progression of liver metastases.  4.  No evidence of osseous metastatic disease.        < end of copied text >

## 2023-01-09 NOTE — CHART NOTE - NSCHARTNOTEFT_GEN_A_CORE
Patient pending CT c/a/p for staging and potential IR biopsy of liver.  At this time patient adamantly refusing to have IV contrast.  Re-ordered w/o contrast however as per radiology would be appropriate for staging purposes.  Patient informed of this with family at bedside and the patient continues to refuse.    Angelica Charles, ANP-C  [id number]

## 2023-01-09 NOTE — CONSULT NOTE ADULT - ASSESSMENT
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 Recently d/c from  on Dec 21th for SOB. Back in  [facility] on 24th for blood transfusion now presenting to the ED via EMS from outpt office for worsening lethargy and intermittent central chest pain.    #Bladder Cancer with Mets  -ECOG of 3/ poor performance status, not eating, continuing to loose weight not a TX candidate at this time  -CT abd/pelvis/ chest for staging  -if patient agreeable can potential do Liver biopsy for definitive dx of mets.    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 Recently d/c from  on Dec 21th for SOB. Back in  ER on 24th for blood transfusion now presenting to the ED via EMS from outpt office for worsening lethargy and intermittent central chest pain.    #Bladder Cancer with Mets  -ECOG of 3/ poor performance status, not eating, continuing to loose weight not a TX candidate at this time  -CT abd/pelvis/ chest for staging  -if patient agreeable can potential do Liver biopsy for definitive dx of mets.    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 Recently d/c from  on Dec 21th for SOB. Back in  ER on 24th for blood transfusion now presenting to the ED via EMS from outpt office for worsening lethargy and intermittent central chest pain.    #Bladder Cancer with Mets  -ECOG of 3/ poor performance status, not eating, continuing to loose weight not a TX candidate at this time  -CT abd/pelvis/ chest for staging  -if patient agreeable can potential do Liver biopsy for definitive dx of mets.    #Chronic Anemia   -continue to mon for now today 8.7 trending down since transfusion on 12/24  -Transfuse if <7 or symptomatic   -likely 2ndary to chronic disease   -check B12 and Folate    #Hypercalcemia   -chronic, today 12.6 trending downward  - continue to mon levels ( daily cmp)  -apprec endo rec  -Check PTH RP    Thank you for the referral. Will continue to monitor the patient.  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 Recently d/c from  on Dec 21th for SOB. Back in  ER on 24th for blood transfusion now presenting to the ED via EMS from outpt office for worsening lethargy and intermittent central chest pain.    #Bladder Cancer with Mets  -ECOG of 3/ poor performance status, not eating, continuing to loose weight not a TX candidate at this time  -CT abd/pelvis/ chest for staging  -if patient agreeable can potentialy do Liver biopsy for definitive dx of mets.    #Chronic Anemia   -continue to mon for now today 8.7 trending down since transfusion on 12/24  -Transfuse if <7 or symptomatic   -likely 2ndary to chronic disease   -check B12 and Folate    #Hypercalcemia   -chronic, today 12.6 trending downward  - continue to mon levels ( daily cmp)  -apprec endo rec  -Check PTH RP    Thank you for the referral. Will continue to monitor the patient.  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 Recently d/c from  on Dec 21th for SOB. Back in  ER on 24th for blood transfusion now presenting to the ED via EMS from outpt office for worsening lethargy and intermittent central chest pain. Oncology on board for management of bladder cancer.    #Bladder Cancer with Mets  -Onc hx as above  -Recurrence has not been diagnosed   -ECOG of 3/ poor performance status, not eating, continuing to loose weight not a candidate at this time chemotherapy or immunotherapy  -No plan for inpatient chemotherapy or immunotherapy  -CT abd/pelvis/ chest for staging  -if patient agreeable can potentially do Liver biopsy for definitive dx of mets.    #Chronic Anemia   -continue to mon for now today 8.7 trending down since transfusion on 12/24  -Transfuse if <7 or symptomatic   -likely 2ndary to chronic disease   -check B12 and Folate    #Hypercalcemia   -chronic, today 12.6 trending downward  - continue to mon levels ( daily cmp)  -apprec endo rec  -Check PTH RP    Thank you for the referral. Will continue to monitor the patient.  Please call with any questions   Above reviewed with Attending Dr. Kan

## 2023-01-09 NOTE — DIETITIAN INITIAL EVALUATION ADULT - ORAL NUTRITION SUPPLEMENTS
continue Ensure at this time until after endo sees pt as per NP continue Ensure (520mg Ca+) and Nikita (200mg Ca+)at this time until after endo sees pt as per NP  change pro source no carb to LPS

## 2023-01-09 NOTE — PROGRESS NOTE ADULT - SUBJECTIVE AND OBJECTIVE BOX
CARDIOLOGY     PROGRESS  NOTE   ________________________________________________    CHIEF COMPLAINT:Patient is a 57y old  Female who presents with a chief complaint of chest pain (08 Jan 2023 15:15)  pain all over  	  REVIEW OF SYSTEMS:  CONSTITUTIONAL: No fever, weight loss, or fatigue  EYES: No eye pain, visual disturbances, or discharge  ENT:  No difficulty hearing, tinnitus, vertigo; No sinus or throat pain  NECK: No pain or stiffness  RESPIRATORY: No cough, wheezing, chills or hemoptysis; No Shortness of Breath  CARDIOVASCULAR: + chest pain, palpitations, passing out, dizziness, or leg swelling  GASTROINTESTINAL: No abdominal or epigastric pain. No nausea, vomiting, or hematemesis; No diarrhea or constipation. No melena or hematochezia.  GENITOURINARY: No dysuria, frequency, hematuria, or incontinence  NEUROLOGICAL: No headaches, memory loss, loss of strength, numbness, or tremors  SKIN: No itching, burning, rashes, or lesions   LYMPH Nodes: No enlarged glands  ENDOCRINE: No heat or cold intolerance; No hair loss  MUSCULOSKELETAL: No joint pain or swelling; No muscle, back, or extremity pain  PSYCHIATRIC: No depression, anxiety, mood swings, or difficulty sleeping  HEME/LYMPH: No easy bruising, or bleeding gums  ALLERGY AND IMMUNOLOGIC: No hives or eczema	    [x ] All others negative	  [ ] Unable to obtain    PHYSICAL EXAM:  T(C): 36.3 (01-09-23 @ 05:17), Max: 36.3 (01-08-23 @ 20:48)  HR: 61 (01-09-23 @ 05:17) (61 - 72)  BP: 98/62 (01-09-23 @ 05:17) (90/59 - 121/73)  RR: 18 (01-09-23 @ 05:17) (16 - 18)  SpO2: 98% (01-09-23 @ 05:17) (98% - 100%)  Wt(kg): --  I&O's Summary    07 Jan 2023 07:01  -  08 Jan 2023 07:00  --------------------------------------------------------  IN: 750 mL / OUT: 0 mL / NET: 750 mL    08 Jan 2023 07:01  -  09 Jan 2023 05:50  --------------------------------------------------------  IN: 350 mL / OUT: 401 mL / NET: -51 mL        Appearance: cachectic	  HEENT:   Normal oral mucosa, PERRL, EOMI	  Lymphatic: No lymphadenopathy  Cardiovascular: Normal S1 S2, No JVD, + murmurs, No edema  Respiratory: rhonchi  Psychiatry: A & O x 3, Mood & affect appropriate  Gastrointestinal:  Soft, Non-tender, + BS	  Skin: No rashes, No ecchymoses, No cyanosis	  Neurologic: Non-focal  Extremities: Normal range of motion, No clubbing, cyanosis or edema  Vascular: Peripheral pulses palpable 2+ bilaterally    MEDICATIONS  (STANDING):  azithromycin  IVPB 500 milliGRAM(s) IV Intermittent every 24 hours  cefTRIAXone   IVPB 1000 milliGRAM(s) IV Intermittent every 24 hours  enoxaparin Injectable 40 milliGRAM(s) SubCutaneous every 24 hours  lactated ringers. 1000 milliLiter(s) (60 mL/Hr) IV Continuous <Continuous>  mirtazapine 15 milliGRAM(s) Oral at bedtime  naloxone Injectable 0.4 milliGRAM(s) IV Push once  polyethylene glycol 3350 17 Gram(s) Oral daily  senna 2 Tablet(s) Oral at bedtime      TELEMETRY: 	    ECG:  	  RADIOLOGY:  OTHER: 	  	  LABS:	 	    CARDIAC MARKERS:                                8.9    16.41 )-----------( 260      ( 08 Jan 2023 18:12 )             29.7     01-07    141  |  103  |  35<H>  ----------------------------<  109<H>  3.7   |  21<L>  |  0.97    Ca    13.8<HH>      07 Jan 2023 07:33    TPro  6.8  /  Alb  3.0<L>  /  TBili  0.2  /  DBili  x   /  AST  24  /  ALT  18  /  AlkPhos  280<H>  01-07    proBNP: Serum Pro-Brain Natriuretic Peptide: 510 pg/mL (01-06 @ 19:43)  Serum Pro-Brain Natriuretic Peptide: 202 pg/mL (12-24 @ 05:41)    Lipid Profile:   HgA1c:   TSH:         Assessment and plan  ---------------------------  Bradycardia, Hypertension, Dyslipidemia, Hydronephrosis, R-nephrostomy tube, Liver cyst, Parathyroid tumor, Chronic pain on morphine 2.5 (last took this morning) and 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 Patient reported the pain is sharp well localized and occasionally pleuritic. Has chronic cough with bloody or brown sputum. Reports feeling dehydrated. Has not had chemo since 5/2022. Patient denied abdominal pain, N/V/D, fever, urinary symptoms, rash  pt with metastatic bladder ca / onc eval  s/p ppm , sss  chest pain doubt cardiac ?metastatic ca bony mets  dvt prophylaxis  severe protein deficiency, nutrition eval  gentle hydration  psych eval start Remeron 15 mg po qhs  pain management  PE has been ruled out seveal admissions  dvt prophylaxis  nutrition consult severe protein deficiency  still awaiting urology and onc eval  check labs  ?abx    	                    CARDIOLOGY     PROGRESS  NOTE   ________________________________________________    CHIEF COMPLAINT:Patient is a 57y old  Female who presents with a chief complaint of chest pain (08 Jan 2023 15:15)  pain all over  	  REVIEW OF SYSTEMS:  CONSTITUTIONAL: No fever, weight loss, or fatigue  EYES: No eye pain, visual disturbances, or discharge  ENT:  No difficulty hearing, tinnitus, vertigo; No sinus or throat pain  NECK: No pain or stiffness  RESPIRATORY: No cough, wheezing, chills or hemoptysis; No Shortness of Breath  CARDIOVASCULAR: + chest pain, palpitations, passing out, dizziness, or leg swelling  GASTROINTESTINAL: No abdominal or epigastric pain. No nausea, vomiting, or hematemesis; No diarrhea or constipation. No melena or hematochezia.  GENITOURINARY: No dysuria, frequency, hematuria, or incontinence  NEUROLOGICAL: No headaches, memory loss, loss of strength, numbness, or tremors  SKIN: No itching, burning, rashes, or lesions   LYMPH Nodes: No enlarged glands  ENDOCRINE: No heat or cold intolerance; No hair loss  MUSCULOSKELETAL: No joint pain or swelling; No muscle, back, or extremity pain  PSYCHIATRIC: No depression, anxiety, mood swings, or difficulty sleeping  HEME/LYMPH: No easy bruising, or bleeding gums  ALLERGY AND IMMUNOLOGIC: No hives or eczema	    [x ] All others negative	  [ ] Unable to obtain    PHYSICAL EXAM:  T(C): 36.3 (01-09-23 @ 05:17), Max: 36.3 (01-08-23 @ 20:48)  HR: 61 (01-09-23 @ 05:17) (61 - 72)  BP: 98/62 (01-09-23 @ 05:17) (90/59 - 121/73)  RR: 18 (01-09-23 @ 05:17) (16 - 18)  SpO2: 98% (01-09-23 @ 05:17) (98% - 100%)  Wt(kg): --  I&O's Summary    07 Jan 2023 07:01  -  08 Jan 2023 07:00  --------------------------------------------------------  IN: 750 mL / OUT: 0 mL / NET: 750 mL    08 Jan 2023 07:01  -  09 Jan 2023 05:50  --------------------------------------------------------  IN: 350 mL / OUT: 401 mL / NET: -51 mL        Appearance: cachectic	  HEENT:   Normal oral mucosa, PERRL, EOMI	  Lymphatic: No lymphadenopathy  Cardiovascular: Normal S1 S2, No JVD, + murmurs, No edema  Respiratory: rhonchi  Psychiatry: A & O x 3, Mood & affect appropriate  Gastrointestinal:  Soft, Non-tender, + BS	  Skin: No rashes, No ecchymoses, No cyanosis	  Neurologic: Non-focal  Extremities: Normal range of motion, No clubbing, cyanosis or edema  Vascular: Peripheral pulses palpable 2+ bilaterally    MEDICATIONS  (STANDING):  azithromycin  IVPB 500 milliGRAM(s) IV Intermittent every 24 hours  cefTRIAXone   IVPB 1000 milliGRAM(s) IV Intermittent every 24 hours  enoxaparin Injectable 40 milliGRAM(s) SubCutaneous every 24 hours  lactated ringers. 1000 milliLiter(s) (60 mL/Hr) IV Continuous <Continuous>  mirtazapine 15 milliGRAM(s) Oral at bedtime  naloxone Injectable 0.4 milliGRAM(s) IV Push once  polyethylene glycol 3350 17 Gram(s) Oral daily  senna 2 Tablet(s) Oral at bedtime      TELEMETRY: 	    ECG:  	  RADIOLOGY:  OTHER: 	  	  LABS:	 	    CARDIAC MARKERS:                                8.9    16.41 )-----------( 260      ( 08 Jan 2023 18:12 )             29.7     01-07    141  |  103  |  35<H>  ----------------------------<  109<H>  3.7   |  21<L>  |  0.97    Ca    13.8<HH>      07 Jan 2023 07:33    TPro  6.8  /  Alb  3.0<L>  /  TBili  0.2  /  DBili  x   /  AST  24  /  ALT  18  /  AlkPhos  280<H>  01-07    proBNP: Serum Pro-Brain Natriuretic Peptide: 510 pg/mL (01-06 @ 19:43)  Serum Pro-Brain Natriuretic Peptide: 202 pg/mL (12-24 @ 05:41)    Lipid Profile:   HgA1c:   TSH:         Assessment and plan  ---------------------------  Bradycardia, Hypertension, Dyslipidemia, Hydronephrosis, R-nephrostomy tube, Liver cyst, Parathyroid tumor, Chronic pain on morphine 2.5 (last took this morning) and 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 Patient reported the pain is sharp well localized and occasionally pleuritic. Has chronic cough with bloody or brown sputum. Reports feeling dehydrated. Has not had chemo since 5/2022. Patient denied abdominal pain, N/V/D, fever, urinary symptoms, rash  pt with metastatic bladder ca / onc eval  s/p ppm , sss  chest pain doubt cardiac ?metastatic ca bony mets  dvt prophylaxis  severe protein deficiency, nutrition eval  gentle hydration  psych eval start Remeron 15 mg po qhs  pain management  PE has been ruled out seveal admissions  dvt prophylaxis  nutrition consult severe protein deficiency appreciated  still awaiting urology and onc eval  check labs, hypercalcemia, increase fluid, stat endocrine eval  ?abx check urin

## 2023-01-09 NOTE — CONSULT NOTE ADULT - SUBJECTIVE AND OBJECTIVE BOX
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 Recently d/c from  on Dec 21th for SOB. Back in  ER on 24th for blood transfusion now presenting to the ED via EMS from outpt office for worsening lethargy and intermittent central chest pain. IR consulted for liver biopsy.     Allergies: NKDA  Medications (Abx/Cardiac/Anticoagulation/Blood Products)  azithromycin  IVPB: 255 mL/Hr IV Intermittent (01-08 @ 21:48)  cefTRIAXone   IVPB: 100 mL/Hr IV Intermittent (01-08 @ 04:34)  cefTRIAXone   IVPB: 100 mL/Hr IV Intermittent (01-09 @ 05:31)  enoxaparin Injectable: 40 milliGRAM(s) SubCutaneous (01-09 @ 13:22)    Data:  T(C): 36.3  HR: 82  BP: 106/65  RR: 18  SpO2: 100%    -WBC 15.88 / HgB 8.7 / Hct 28.9 / Plt 232  -Na 142 / Cl 105 / BUN 34 / Glucose 76  -K 3.2 / CO2 20 / Cr 0.85  -ALT -- / Alk Phos -- / T.Bili --  -INR 1.27 / PTT 30.7    Radiology: pending CT    Assessment/Plan: 57F PMH Asthma, Anemia, Bladder cancer with  metastases to the lungs(patient of Dr Kan) on chronic home O2, Bradycardia, Hypertension, Dyslipidemia, Hydronephrosis, R-nephrostomy tube, Liver cyst, Parathyroid tumor, Chronic pain on morphine 2.5 (last took this morning) and 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 Recently d/c from  on Dec 21th for SOB. Back in  ER on 24th for blood transfusion now presenting to the ED via EMS from outpt office for worsening lethargy and intermittent central chest pain. IR consulted for liver biopsy.     **consult in progress**

## 2023-01-09 NOTE — DIETITIAN INITIAL EVALUATION ADULT - NS FNS DIET ORDER
Diet, Regular:   Nikita(7 Gm Arginine/7 Gm Glut/1.2 Gm HMB     Qty per Day:  1  No Carb Prosource (1pkg = 15gms Protein)     Qty per Day:  1  Supplement Feeding Modality:  Oral  Ensure Plus High Protein Cans or Servings Per Day:  3       Frequency:  Three Times a day (01-08-23 @ 20:05)

## 2023-01-09 NOTE — PROGRESS NOTE ADULT - ASSESSMENT
_________________________________________________________________________________________  ========>>  M E D I C A L   A T T E N D I N G    F O L L O W  U P  N O T E  <<=========  -----------------------------------------------------------------------------------------------------    - Patient seen and examined by me earlier today.   - In summary,  JEF HOUSE is a 57y year old woman admitted with chest discomfort   - Patient today overall doing ok, comfortable, eating fairly > encouraged as patient appears very weak , supplements ordered, at bedside..     ==================>> REVIEW OF SYSTEM <<=================    GEN: no fever, no chills, chronic pain in back / flank stable / controlled   RESP: no SOB, no cough, no sputum  CVS: no chest pain now reported , no palpitations  GI: no abdominal pain, no nausea, chronic constipation  : no dysuria, no frequency, chronic hematuria, chronic vaginal bleeding   Neuro: no headache, no dizziness  Derm : no itching, no rash    ==================>> PHYSICAL EXAM <<=================    GEN: A&O X 3 , NAD , comfortable, pleasant, calm , cachectic, sitting on bed   HEENT: NCAT, PERRL, MMM, hearing intact, temporal wasting   Neck: supple , no JVD appreciated  CVS: S1S2 , regular , No M/R/G appreciated  PULM: CTA B/L,  no W/R/R appreciated  ABD.: soft. non tender, non distended  Extrem: intact pulses , trace ankle edema , cachectic . + nephrostomy tube with urine draining   PSYCH : flat affect, not anxious                                  ( Note written / Date of service 01-09-23 )    ==================>> MEDICATIONS <<====================    cefTRIAXone   IVPB 1000 milliGRAM(s) IV Intermittent every 24 hours  chlorhexidine 2% Cloths 1 Application(s) Topical <User Schedule>  enoxaparin Injectable 40 milliGRAM(s) SubCutaneous every 24 hours  iron sucrose IVPB 200 milliGRAM(s) IV Intermittent once  lactated ringers. 1000 milliLiter(s) IV Continuous <Continuous>  mirtazapine 15 milliGRAM(s) Oral at bedtime  naloxone Injectable 0.4 milliGRAM(s) IV Push once  polyethylene glycol 3350 17 Gram(s) Oral daily  senna 2 Tablet(s) Oral at bedtime    MEDICATIONS  (PRN):  acetaminophen     Tablet .. 325 milliGRAM(s) Oral every 6 hours PRN Mild Pain (1 - 3)  aluminum hydroxide/magnesium hydroxide/simethicone Suspension 30 milliLiter(s) Oral every 4 hours PRN Dyspepsia  melatonin 3 milliGRAM(s) Oral at bedtime PRN Insomnia  morphine   Solution 5 milliGRAM(s) Oral every 4 hours PRN Severe Pain (7 - 10)  ondansetron Injectable 4 milliGRAM(s) IV Push every 8 hours PRN Nausea and/or Vomiting    ___________  Active diet:  Diet, Easy to Chew:   Nikita(7 Gm Arginine/7 Gm Glut/1.2 Gm HMB     Qty per Day:  1  Supplement Feeding Modality:  Oral  Ensure Clear Cans or Servings Per Day:  3       Frequency:  Daily  ___________________    ==================>> VITAL SIGNS <<==================    Height (cm): 162.6  Weight (kg): 36  BMI (kg/m2): 13.6  Vital Signs Last 24 HrsT(C): 36.3 (01-09-23 @ 12:08)  T(F): 97.3 (01-09-23 @ 12:08), Max: 97.3 (01-08-23 @ 20:48)  HR: 82 (01-09-23 @ 12:08) (61 - 82)  BP: 106/65 (01-09-23 @ 12:08)  RR: 18 (01-09-23 @ 12:08) (18 - 18)  SpO2: 100% (01-09-23 @ 12:08) (98% - 100%)       ==================>> LAB AND IMAGING <<==================                        8.7    15.88 )-----------( 232      ( 09 Jan 2023 07:19 )             28.9        01-09    142  |  105  |  34<H>  ----------------------------<  76  3.2<L>   |  20<L>  |  0.85    Ca    12.6<H>      09 Jan 2023 07:13    WBC count:   15.88 <<== ,  16.41 <<== ,  16.51 <<== ,  18.35 <<==   Hemoglobin:   8.7 <<==,  8.9 <<==,  9.6 <<==,  9.4 <<==  platelets:  232 <==, 260 <==, 316 <==, 225 <==    Creatinine:  0.85  <<==, 0.97  <<==, 0.99  <<==, 1.04  <<==  Sodium:   142  <==, 141  <==, 138  <==, 139  <==       AST:          24 <== , 26 <==      ALT:        18  <== , 19  <==      AP:        280  <=, 339  <=     Bili:        0.2  <=, 0.4  <=    ___________________________________________________________________________________  ===============>>  A S S E S S M E N T   A N D   P L A N <<===============  ------------------------------------------------------------------------------------------    · Assessment	  58 y/o woman with PMH Asthma, Anemia, Bladder cancer with metastases to the lungs, direct invasion to vagina, on chronic home O2 for comfort, Bradycardia / AF post PPM, Dyslipidemia, Hydronephrosis, R-nephrostomy tube, Liver cyst, Parathyroid tumor, Chronic pain on morphine sulfate at home ( not on hospice) presenting to the ED via EMS from oncologist office for worsening lethargy and intermittent central chest pain. Found to have profound hypercalcemia.     Problem/Plan - 1:  ·  Problem: Hypercalcemia.   ·  Plan: -pt with h/o parathyroid tumor so could be driving process vs Hypercalcemia of malignancy     >> PTH is low so this is likely from malignancy ( and dehydration )    -c/w IV hydration as calcium downtrending   -s/p calcitonin in ED  -heme/onc consult and further management   - trend CMP     to consider bisphosphonates if not improving..     Problem/Plan - 2:  ·  Problem: Leukocytosis.   ·  Plan: -chronic  however with reported productive cough with pleuritic chest pain and elevated PCT     under treatment empirically for possible PNA ( patient declined to have an xray )!   -trend WBC   -c/w ceftriaxone for now ! | azithro DCed already     Problem/Plan - 3:  ·  Problem: Bladder cancer.   ·  Plan: -no chemo since 05/2022  -heme/onc f/u     plan as OP was to have another biopsy     Problem/Plan - 4:  ·  Problem: Prophylactic measure.   ·  Plan: -Diet: Regular  -DVT ppx: Lovenox qd.    IV Iron X1 to prevent further anemia given chronic bleeding as above    --------------------------------------------  Case discussed with patient, NP, Cardio  Education given on findings and plan of care  ___________________________  H. NIEVES Ochoa.  Pager: [phone]

## 2023-01-10 NOTE — PHYSICAL THERAPY INITIAL EVALUATION ADULT - ADDITIONAL COMMENTS
Pt resides with her daughter and sister in a 2 family house with 15 steps to enter.  Prior to admission pt required assistance with ambulation and ADLs.  Pt owns a rolling walker.

## 2023-01-10 NOTE — PHYSICAL THERAPY INITIAL EVALUATION ADULT - NSPTDISCHREC_GEN_A_CORE
to be determined following completion of functional eval for transfers and ambulation if dc home pt requires assist for all functional mobility & home PT for progressive ambulation training, transfers, bed mobs, and safety assessment. DME: hospital bed, 3-in1 commode, RW, transport chair/[facility]

## 2023-01-10 NOTE — ADVANCED PRACTICE NURSE CONSULT - REASON FOR CONSULT
Wound care consult initiated by RN to assess patient's skin for a possible stage 3 on left trochanter      Reason for Admission: chest pain  History of Present Illness:   57F PMH Asthma, Anemia, Bladder cancer with metastases to the lungs on chronic home O2, Bradycardia, Hypertension, Dyslipidemia, Hydronephrosis, R-nephrostomy tube, Liver cyst, Parathyroid tumor, Chronic pain on morphine 2.5 (last took this morning) and PPM in-situ now presenting to the ED via EMS from outpt office for worsening lethargy and intermittent central chest pain. Unable to obtain complete history from pt as she was in significant pain and complained about feeling cold. She mentioned having chronic chest pain however notes this pain in particular started roughly 3-weeks ago described as sharp and pleuritic. She also has a chronic cough productive of reddish/brownish sputum. She reports feeling dehydrated. Has not had chemo since 5/2022. Patient denied abdominal pain, N/V/D, fever, urinary symptoms, rash

## 2023-01-10 NOTE — PROGRESS NOTE ADULT - ASSESSMENT
( Note written / Date of service 01-10-23 )    ==================>> MEDICATIONS <<====================    cefTRIAXone   IVPB 1000 milliGRAM(s) IV Intermittent every 24 hours  chlorhexidine 2% Cloths 1 Application(s) Topical <User Schedule>  enoxaparin Injectable 40 milliGRAM(s) SubCutaneous every 24 hours  lactated ringers. 1000 milliLiter(s) IV Continuous <Continuous>  mirtazapine 15 milliGRAM(s) Oral at bedtime  naloxone Injectable 0.4 milliGRAM(s) IV Push once  polyethylene glycol 3350 17 Gram(s) Oral daily  senna 2 Tablet(s) Oral at bedtime    MEDICATIONS  (PRN):  acetaminophen     Tablet .. 325 milliGRAM(s) Oral every 6 hours PRN Mild Pain (1 - 3)  aluminum hydroxide/magnesium hydroxide/simethicone Suspension 30 milliLiter(s) Oral every 4 hours PRN Dyspepsia  melatonin 3 milliGRAM(s) Oral at bedtime PRN Insomnia  morphine   Solution 5 milliGRAM(s) Oral every 4 hours PRN Moderate Pain (4 - 6)  morphine   Solution 10 milliGRAM(s) Oral every 4 hours PRN Severe Pain (7 - 10)  ondansetron Injectable 4 milliGRAM(s) IV Push every 8 hours PRN Nausea and/or Vomiting    ___________  Active diet:  Diet, Easy to Chew:   Nikita(7 Gm Arginine/7 Gm Glut/1.2 Gm HMB     Qty per Day:  1  Supplement Feeding Modality:  Oral  Ensure Clear Cans or Servings Per Day:  3       Frequency:  Daily  ___________________    ==================>> VITAL SIGNS <<==================  Height (cm): 162.6  Weight (kg): 36  BMI (kg/m2): 13.6  Vital Signs Last 24 HrsT(C): 36.4 (01-10-23 @ 18:01)  T(F): 97.6 (01-10-23 @ 18:01), Max: 97.6 (01-10-23 @ 18:01)  HR: 67 (01-10-23 @ 18:01) (67 - 83)  BP: 92/57 (01-10-23 @ 18:01)  RR: 18 (01-10-23 @ 18:01) (18 - 18)  SpO2: 100% (01-10-23 @ 18:01) (98% - 100%)      CAPILLARY BLOOD GLUCOSE         ==================>> LAB AND IMAGING <<==================                        9.2    17.85 )-----------( 205      ( 10 Fabrizio 2023 07:11 )             30.0        01-10    142  |  105  |  24<H>  ----------------------------<  68<L>  3.0<L>   |  24  |  0.70    Ca    13.0<HH>      10 Fabrizio 2023 07:09  Mg     1.5     01-10      WBC count:   17.85 <<== ,  15.88 <<== ,  16.41 <<== ,  16.51 <<== ,  18.35 <<==   Hemoglobin:   9.2 <<==,  8.7 <<==,  8.9 <<==,  9.6 <<==,  9.4 <<==  platelets:  205 <==, 232 <==, 260 <==, 316 <==, 225 <==    Creatinine:  0.70  <<==, 0.85  <<==, 0.97  <<==, 0.99  <<==, 1.04  <<==  Sodium:   142  <==, 142  <==, 141  <==, 138  <==, 139  <==       AST:          24 <== , 26 <==      ALT:        18  <== , 19  <==      AP:        280  <=, 339  <=     Bili:        0.2  <=, 0.4  <=    ____________________________    M I C R O B I O L O G Y :      SARS-CoV-2: NotDetec (01-06-23 @ 20:40)  COVID-19 PCR: NotDetec (12-23-22 @ 23:54)     _________________________________________________________________________________________  ========>>  M E D I C A L   A T T E N D I N G    F O L L O W  U P  N O T E  <<=========  -----------------------------------------------------------------------------------------------------    - Patient seen and examined by me earlier today.   - In summary,  JEF HOUSE is a 57y year old woman admitted with chest discomfort   - Patient today overall doing ok, comfortable, eating fairly > encouraged as patient appears very weak , supplements ordered, at bedside..     ==================>> REVIEW OF SYSTEM <<=================    GEN: no fever, no chills, chronic pain in back / flank stable / controlled   RESP: no SOB, no cough, no sputum  CVS: no chest pain now reported , no palpitations  GI: no abdominal pain, no nausea, chronic constipation  : no dysuria, no frequency, chronic hematuria, chronic vaginal bleeding   Neuro: no headache, no dizziness  Derm : no itching, no rash    ==================>> PHYSICAL EXAM <<=================    GEN: A&O X 3 , NAD , comfortable, pleasant, calm , cachectic, in bed   HEENT: NCAT, PERRL, MMM, hearing intact, temporal wasting   Neck: supple , no JVD appreciated  CVS: S1S2 , regular , No M/R/G appreciated  PULM: CTA B/L,  no W/R/R appreciated  ABD.: soft. non tender, non distended  Extrem: intact pulses , trace ankle edema , cachectic . + nephrostomy tube with urine draining   PSYCH : flat affect, not anxious                             ( Note written / Date of service 01-10-23 )    ==================>> MEDICATIONS <<====================    cefTRIAXone   IVPB 1000 milliGRAM(s) IV Intermittent every 24 hours  chlorhexidine 2% Cloths 1 Application(s) Topical <User Schedule>  enoxaparin Injectable 40 milliGRAM(s) SubCutaneous every 24 hours  lactated ringers. 1000 milliLiter(s) IV Continuous <Continuous>  mirtazapine 15 milliGRAM(s) Oral at bedtime  naloxone Injectable 0.4 milliGRAM(s) IV Push once  polyethylene glycol 3350 17 Gram(s) Oral daily  senna 2 Tablet(s) Oral at bedtime    MEDICATIONS  (PRN):  acetaminophen     Tablet .. 325 milliGRAM(s) Oral every 6 hours PRN Mild Pain (1 - 3)  aluminum hydroxide/magnesium hydroxide/simethicone Suspension 30 milliLiter(s) Oral every 4 hours PRN Dyspepsia  melatonin 3 milliGRAM(s) Oral at bedtime PRN Insomnia  morphine   Solution 5 milliGRAM(s) Oral every 4 hours PRN Moderate Pain (4 - 6)  morphine   Solution 10 milliGRAM(s) Oral every 4 hours PRN Severe Pain (7 - 10)  ondansetron Injectable 4 milliGRAM(s) IV Push every 8 hours PRN Nausea and/or Vomiting    ___________  Active diet:  Diet, Easy to Chew:   Nikita(7 Gm Arginine/7 Gm Glut/1.2 Gm HMB     Qty per Day:  1  Supplement Feeding Modality:  Oral  Ensure Clear Cans or Servings Per Day:  3       Frequency:  Daily  ___________________    ==================>> VITAL SIGNS <<==================  Height (cm): 162.6  Weight (kg): 36  BMI (kg/m2): 13.6  Vital Signs Last 24 HrsT(C): 36.4 (01-10-23 @ 18:01)  T(F): 97.6 (01-10-23 @ 18:01), Max: 97.6 (01-10-23 @ 18:01)  HR: 67 (01-10-23 @ 18:01) (67 - 83)  BP: 92/57 (01-10-23 @ 18:01)  RR: 18 (01-10-23 @ 18:01) (18 - 18)  SpO2: 100% (01-10-23 @ 18:01) (98% - 100%)       ==================>> LAB AND IMAGING <<==================                        9.2    17.85 )-----------( 205      ( 10 Fabrizio 2023 07:11 )             30.0        01-10    142  |  105  |  24<H>  ----------------------------<  68<L>  3.0<L>   |  24  |  0.70    Ca    13.0<HH>      10 Fabrizio 2023 07:09  Mg     1.5     01-10      WBC count:   17.85 <<== ,  15.88 <<== ,  16.41 <<== ,  16.51 <<== ,  18.35 <<==   Hemoglobin:   9.2 <<==,  8.7 <<==,  8.9 <<==,  9.6 <<==,  9.4 <<==  platelets:  205 <==, 232 <==, 260 <==, 316 <==, 225 <==    Creatinine:  0.70  <<==, 0.85  <<==, 0.97  <<==, 0.99  <<==, 1.04  <<==  Sodium:   142  <==, 142  <==, 141  <==, 138  <==, 139  <==       AST:          24 <== , 26 <==      ALT:        18  <== , 19  <==      AP:        280  <=, 339  <=     Bili:        0.2  <=, 0.4  <=    ___________________________________________________________________________________  ===============>>  A S S E S S M E N T   A N D   P L A N <<===============  ------------------------------------------------------------------------------------------    · Assessment	  56 y/o woman with PMH Asthma, Anemia, Bladder cancer with metastases to the lungs, direct invasion to vagina, on chronic home O2 for comfort, Bradycardia / AF post PPM, Dyslipidemia, Hydronephrosis, R-nephrostomy tube, Liver cyst, Parathyroid tumor, Chronic pain on morphine sulfate at home ( not on hospice) presenting to the ED via EMS from oncologist office for worsening lethargy and intermittent central chest pain. Found to have profound hypercalcemia.     Problem/Plan - 1:  ·  Problem: Hypercalcemia.   ·  Plan: -pt with h/o parathyroid tumor so could be driving process vs Hypercalcemia of malignancy     >> PTH is low so this is likely from malignancy ( and dehydration )    -c/w IV hydration as calcium downtrending   -s/p calcitonin in ED >> repeat dose per Onc discussion and Bisphosphonate order as noted   -heme/onc f/u  - trend CMP    Problem/Plan - 2:  ·  Problem: Leukocytosis.   ·  Plan: -chronic  however with reported productive cough with pleuritic chest pain and elevated PCT     under treatment empirically for possible PNA ( patient declined to have an xray )!   -trend WBC   -c/w ceftriaxone for now ! | celio DCed already     Problem/Plan - 3:  ·  Problem: Bladder cancer.   ·  Plan: -no chemo since 05/2022  -heme/onc f/u     plan as OP was to have another biopsy   onc appreciated  plan for liver biopsy when patient stable / better.. ?    Problem/Plan - 4:  ·  Problem: Prophylactic measure.   ·  Plan: -Diet: Regular  -DVT ppx: Lovenox qd.      --------------------------------------------  Case discussed with patient, NP,..   Education given on findings and plan of care  ___________________________  H. NIEVES Ochoa.  Pager: [phone]

## 2023-01-10 NOTE — ADVANCED PRACTICE NURSE CONSULT - RECOMMEDATIONS
Impression:    B/L buttocks/sacral deep tissue injury in evolution, present on admission    Recommendations:    1) continue turning and positioning q2 and PRN utilizing offloading assistive devices  2) continue with routine pericare daily and PRN soiling  3) encourage optimal nutrition  4) waffle cushion when oob to chair  5) B/L LE complete cair air fluidized boots to offload heels/feet  6) sacrum- cleanse with NS, pat dry, apply cavilon to periwound then cover with allevyn and change every other day and/or PRN soiling    Plan discussed with BRENDEN Silva on unit

## 2023-01-10 NOTE — CONSULT NOTE ADULT - PROBLEM SELECTOR RECOMMENDATION 9
pt reports that pain is improved with current dosing but occasionally it doesn't work when the pain is severe.  Will change indication for moderate and add severe dosing  morphine solution 5 mg po q4 prn moderate pain   morphine solution 10 mg po q4h prn severe pain  reviewed dosing changes with pt and is able to teach back syd

## 2023-01-10 NOTE — PHYSICAL THERAPY INITIAL EVALUATION ADULT - LEVEL OF INDEPENDENCE: SIT/SUPINE, REHAB EVAL
long-sitting in bed, pulling forward via BUE assist on handrails/moderate assist (50% patients effort)

## 2023-01-10 NOTE — CONSULT NOTE ADULT - ASSESSMENT
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 Recently d/c from  on Dec 21th for SOB. Back in  ER on 24th for blood transfusion now presenting to the ED via EMS from outpt office for worsening lethargy and intermittent central chest pain. Oncology on board for management of bladder cancer. Palliative care called for Pain.

## 2023-01-10 NOTE — CONSULT NOTE ADULT - SUBJECTIVE AND OBJECTIVE BOX
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 Unable to obtain complete history from pt as she was in significant pain and complained about feeling cold. She mentioned having chronic chest pain however notes this pain in particular started roughly 3-weeks ago described as sharp and pleuritic. She also has a chronic cough productive of reddish/brownish sputum. She reports feeling dehydrated. Has not had chemo since 2022. Patient denied abdominal pain, N/V/D, fever, urinary symptoms, rash (2023 23:58)    PERTINENT PM/SXH:   Anemia    Asthma    HLD (hyperlipidemia)    Pacemaker    Bladder cancer    HTN (hypertension)    Parathyroid tumor    Liver cyst    Hydronephrosis    Bradycardia    History of renal calculi    Birthmark      H/O myomectomy    Presence of cardiac pacemaker    H/O hydronephrosis      FAMILY HISTORY:  Family history of prostate cancer (Father)    Family history of CHF (congestive heart failure) (Father)    Family history of atrial fibrillation (Father)      Family Hx substance abuse [ ]yes [ ]no  ITEMS NOT CHECKED ARE NOT PRESENT    SOCIAL HISTORY:   Significant other/partner[ ]  Children[ ]  Denominational/Spirituality:  Substance hx:  [ ]   Tobacco hx:  [ ]   Alcohol hx: [ ]   Home Opioid hx:  [ ] I-Stop Reference No:  Living Situation: [x ]Home  [ ]Long term care  [ ]Rehab [ ]Other    ADVANCE DIRECTIVES:    DNR/MOLST  [ ]  Living Will  [ ]   DECISION MAKER(s):  [ ] Health Care Proxy(s)  [ ] Surrogate(s)  [ ] Guardian           Name(s): Phone Number(s): Giovanna [phone]    BASELINE (I)ADL(s) (prior to admission):  [place]: [ ]Total  [ ] Moderate [x ]Dependent    Allergies    No Known Allergies    Intolerances    MEDICATIONS  (STANDING):  cefTRIAXone   IVPB 1000 milliGRAM(s) IV Intermittent every 24 hours  chlorhexidine 2% Cloths 1 Application(s) Topical <User Schedule>  enoxaparin Injectable 40 milliGRAM(s) SubCutaneous every 24 hours  lactated ringers. 1000 milliLiter(s) (100 mL/Hr) IV Continuous <Continuous>  mirtazapine 15 milliGRAM(s) Oral at bedtime  naloxone Injectable 0.4 milliGRAM(s) IV Push once  polyethylene glycol 3350 17 Gram(s) Oral daily  potassium chloride  10 mEq/100 mL IVPB 10 milliEquivalent(s) IV Intermittent every 1 hour  senna 2 Tablet(s) Oral at bedtime    MEDICATIONS  (PRN):  acetaminophen     Tablet .. 325 milliGRAM(s) Oral every 6 hours PRN Mild Pain (1 - 3)  aluminum hydroxide/magnesium hydroxide/simethicone Suspension 30 milliLiter(s) Oral every 4 hours PRN Dyspepsia  melatonin 3 milliGRAM(s) Oral at bedtime PRN Insomnia  morphine   Solution 5 milliGRAM(s) Oral every 4 hours PRN Moderate Pain (4 - 6)  morphine   Solution 10 milliGRAM(s) Oral every 4 hours PRN Severe Pain (7 - 10)  ondansetron Injectable 4 milliGRAM(s) IV Push every 8 hours PRN Nausea and/or Vomiting    PRESENT SYMPTOMS: [ ]Unable to self-report  [ ] CPOT [ ] PAINADs [ ] RDOS  Source if other than patient:  [ ]Family   [ ]Team     Pain: [ ]yes [ ]no  QOL impact -   Location -     lower abd                Aggravating factors - movement  Quality - sore/pressure  Radiation - throughout lower abd  Timing- constant  Severity (0-10 scale): cna be 10/10  Minimal acceptable level (0-10 scale):     CPOT:    https://www.[facility].org/getattachment/[email or web address]-8578y0587u7i/Critical-Care-Pain-Observation-Tool-(CPOT)    PAIN AD Score:   http://geriatrictoolkit.[place].[facility]/cog/painad.pdf (press ctrl +  left click to view)    Dyspnea:                           [ ]Mild [ ]Moderate [ ]Severe      RDOS:  0 to 2  minimal or no respiratory distress   3  mild distress  4 to 6 moderate distress  >7 severe distress  https://homecareinformation.net/handouts/hen/Respiratory_Distress_Observation_Scale.pdf (Ctrl +  left click to view)     Anxiety:                             [ ]Mild [ ]Moderate [ ]Severe  Fatigue:                             [ ]Mild [x ]Moderate [ ]Severe  Nausea:                             [ ]Mild [ ]Moderate [ ]Severe  Loss of appetite:               [ ]Mild [x ]Moderate [ ]Severe  Constipation:                    [ ]Mild [ ]Moderate [ ]Severe    PCSSQ[Palliative Care Spiritual Screening Question]   Severity (0-10):  Score of 4 or > indicate consideration of Chaplaincy referral.  Chaplaincy Referral: [ ] yes [ ] refused [ ] following [x ] Deferred     Caregiver Talmage? : [ ] yes [ ] no [x ] Deferred [ ] Declined             Social work referral [ ] Patient & Family Centered Care Referral [ ]     Anticipatory Grief present?:  [ ] yes [ ] no  [ x] Deferred                  Social work referral [ ] Chaplaincy Referral[ ]      Other Symptoms:  [x ]All other review of systems negative     Palliative Performance Status Version 2:    30     %    http://npcrc.org/files/news/palliative_performance_scale_ppsv2.pdf  PHYSICAL EXAM:  Vital Signs Last 24 Hrs  T(C): 36.3 (10 Fabrizio 2023 11:24), Max: 36.3 (2023 21:36)  T(F): 97.4 (10 Fabrizio 2023 11:24), Max: 97.4 (2023 21:36)  HR: 68 (10 Fabrizio 2023 11:24) (68 - 83)  BP: 98/67 (10 Fabrizio 2023 11:24) (98/67 - 119/72)  BP(mean): --  RR: 18 (10 Fabrizio 2023 11:24) (18 - 18)  SpO2: 99% (10 Fabrizio 2023 11:24) (98% - 99%)    Parameters below as of 10 Fabrizio 2023 11:24  Patient On (Oxygen Delivery Method): room air     I&O's Summary    2023 07:01  -  10 Fabrizio 2023 07:00  --------------------------------------------------------  IN: 240 mL / OUT: 1365 mL / NET: -1125 mL    10 Fabrizio 2023 07:01  -  10 Fabrizio 2023 13:34  --------------------------------------------------------  IN: 0 mL / OUT: 400 mL / NET: -400 mL      GENERAL: [ ]Cachexia    [ x]Alert  x[ ]Oriented x 3   [ ]Lethargic  [ ]Unarousable  [x ]Verbal  [ ]Non-Verbal  Behavioral:   [ ] Anxiety  [ ] Delirium [ ] Agitation [ ] Other  HEENT:  [ ]Normal   [x ]Dry mouth   [ ]ET Tube/Trach  [ ]Oral lesions  PULMONARY:   [x ]Clear [ ]Tachypnea  [ ]Audible excessive secretions   [ ]Rhonchi        [ ]Right [ ]Left [ ]Bilateral  [ ]Crackles        [ ]Right [ ]Left [ ]Bilateral  [ ]Wheezing     [ ]Right [ ]Left [ ]Bilateral  [ ]Diminished breath sounds [ ]right [ ]left [ ]bilateral  CARDIOVASCULAR:    [x ]Regular [ ]Irregular [ ]Tachy  [ ]Bradley [ ]Murmur [ ]Other  GASTROINTESTINAL:  [x ]Soft  [ x]Distended   x ]+BS  [ ]Non tender [ ]Tender  [ ]Other [ ]PEG [ ]OGT/ NGT  Last BM:  GENITOURINARY:  [ x]Normal [ ] Incontinent   [ ]Oliguria/Anuria   [ ]Lloyd  MUSCULOSKELETAL:   [ ]Normal   [ x]Weakness  [ ]Bed/Wheelchair bound [ ]Edema  NEUROLOGIC:   [x ]No focal deficits  [ ]Cognitive impairment  [ ]Dysphagia [ ]Dysarthria [ ]Paresis [ ]Other   SKIN:   [ x]Normal  [ ]Rash  [ ]Other  [ ]Pressure ulcer(s)       Present on admission [ ]y [ ]n    CRITICAL CARE:  [ ] Shock Present  [ ]Septic [ ]Cardiogenic [ ]Neurologic [ ]Hypovolemic  [ ]  Vasopressors [ ]  Inotropes   [ ]Respiratory failure present [ ]Mechanical ventilation [ ]Non-invasive ventilatory support [ ]High flow    [ ]Acute  [ ]Chronic [ ]Hypoxic  [ ]Hypercarbic [ ]Other  [ ]Other organ failure     LABS:                        9.2    17.85 )-----------( 205      ( 10 Fabrizio 2023 07:11 )             30.0   01-10    142  |  105  |  24<H>  ----------------------------<  68<L>  3.0<L>   |  24  |  0.70    Ca    13.0<HH>      10 Fabrizio 2023 07:09  Mg     1.5     01-10        Urinalysis Basic - ( 2023 07:19 )    Color: Light Yellow / Appearance: Slightly Turbid / S.020 / pH: x  Gluc: x / Ketone: Small  / Bili: Negative / Urobili: Negative   Blood: x / Protein: Trace / Nitrite: Negative   Leuk Esterase: Large / RBC: 36 /hpf /  /HPF   Sq Epi: x / Non Sq Epi: 1 /hpf / Bacteria: Negative      RADIOLOGY & ADDITIONAL STUDIES:        PROTEIN CALORIE MALNUTRITION PRESENT: [ ]mild [ ]moderate [ ]severe [ ]underweight [ ]morbid obesity  https://www.andeal.org/vault/2440/web/files/ONC/Table_Clinical%20Characteristics%20to%20Document%20Malnutrition-White%20JV%20et%20al%2020.pdf    Height (cm): 162.6 (23 @ 23:00), 162.6 (22 @ 22:59), 162.6 (22 @ 15:25)  Weight (kg): 36 (23 @ 23:00), 33.6 (22 @ 22:59), 35.2986 (22 @ 10:00)  BMI (kg/m2): 13.6 (23 @ 23:00), 12.7 (22 @ 22:59), 13.4 (22 @ 10:00)    [ ]PPSV2 < or = to 30% [ ]significant weight loss  [ ]poor nutritional intake  [ ]anasarca[ ]Artificial Nutrition      Other REFERRALS:  [ ]Hospice  [ ]Child Life  [ ]Social Work  [ ]Case management [ ]Holistic Therapy     Goals of Care Document: ARTEMIO Schwartz (22 @ 15:27)  Goals of Care Conversation:   Participants:  · Participants  Patient; Family  · Relative  Sister Giovanna    Advance Directives:  · Caregiver:  no    Conversation Discussion:  · Conversation Details  Palliative care SW spoke with pt and her sister/HCP Giovanna at bedside to provide emotional support. Pt reported that she was not feeling well and deferred conversation to her sister. Pt's sister Giovanna reported that she is adjusting appropriately to her sister's hospitalization and poor prognosis. Pt's sister expressed feelings of anticipatory grief around the pt's recent decline, but continued to share that she is "staying strong" for her sister. Pt's sister shared that the pt is well supported by their large family and that they are going to continue to provide the pt with emotional support as well as bedside support. Pt's sister shared that she has been taking off from work to support her sister, in addition to the plan of the pt's son to hopefully be able to take a brief leave from the  to be at his mother's bedside. Palliative care SW provided empathetic listening and emotional support. Pt's sister reported that she had no further psychosocial needs and agreed to reach out as needed.    What Matters Most To Patient and Family:  · What matters most to patient and family  Supporting the pt through her hospitalization    Personal Advance Directives Treatment Guidelines:   MOLST:  · Completed  05-Dec-2022    Location of Discussion:   Location of Discussion:  · Location of discussion  Face to face      Electronic Signatures:  Jaspal Schwartz ([facility])  (Signed 12-Dec-2022 15:33)  	Authored: Goals of Care Conversation, Personal Advance Directives Treatment Guidelines, Location of Discussion      Last Updated: 12-Dec-2022 15:33 by Jaspal Schwartz ([facility])

## 2023-01-10 NOTE — PHYSICAL THERAPY INITIAL EVALUATION ADULT - REFERRING PHYSICIAN, REHAB EVAL
Angelica Charles Consult- PT evaluate and treat, Activity- OOB with assist, Activity- Ambulate as tolerated

## 2023-01-10 NOTE — PHYSICAL THERAPY INITIAL EVALUATION ADULT - TRANSFER TRAINING, PT EVAL
GOAL: Pt will perform ALL transfers with CG x1 assist, w/use of appropriate assistive device as needed, in 4 weeks. GOAL: Pt will perform ALL transfers with supervision, w/use of appropriate assistive device as needed, in 4 weeks.

## 2023-01-10 NOTE — PHYSICAL THERAPY INITIAL EVALUATION ADULT - PERTINENT HX OF CURRENT PROBLEM, REHAB EVAL
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 Found to have profound hypercalcemia, s/p calcitonin in ED. IR consulted for liver biopsy, pending CT c/a/p for staging however pt refusing to have IV contrast.

## 2023-01-10 NOTE — CHART NOTE - NSCHARTNOTEFT_GEN_A_CORE
Medicine PA Note     Notified by day team of patient's elevated calcium levels. Heme/onc was consulted during the day and followed up overnight   Discussed with overnight heme/onc fellow, Dr. Meng, advised for 2 more doses of calcitonin, 4mg of Zomida IVPB and increasing the rate of LR from 100cc to 120cc an hr   Discussed with attending, Dr. Ochoa, aware of above event   RN aware of management     Saima Esteban PA-C   Dept of Medicine   [id number] Medicine PA Note     Notified by day team of patient's elevated calcium levels. Heme/onc was consulted during the day and followed up overnight   Discussed with overnight heme/onc fellow, Dr. Meng, advised for 2 more doses of calcitonin, 4mg of Zometa IVPB and increasing the rate of LR from 100cc to 120cc an hr   Discussed with attending, Dr. Ochoa, aware of above event   RN aware of management     Saima Esteban PA-C   Dept of Medicine   [id number]

## 2023-01-10 NOTE — ADVANCED PRACTICE NURSE CONSULT - ASSESSMENT
When wound care RN arrived on unit, patient was found lying in a low air loss pressure redistribution support surface style bed. Patient was alert and oriented and gave consent to skin consult. Ms Bell is able to turn independently however she states that she is too weak to turn at this time and staff assistance x 1 was provided. Once turned, wound care RN was able to visualize an area of persistent nonblanchable dark discoloration that is inconsistent with patient's skin tone over B/L buttocks/sacral skin measuring approximately 6cm x 6cm x 0cm. Within this location is an area directly over bessy coccyx/sacrum with superficial partial thickness skin loss measuring approximately 3cm x 3cm x 0.1cm- this presentation is consistent with a deep tissue injury in evolution, present on admission. Once consult was complete, patient was placed in a right side-lying position utilizing pillow positioner assistive devices. At that time, pt states she is in too much pain to lay on side and patient repositioned self in a seated fashion upright in bed. Patient was educated on the need for routine turning and positioning to prevent pressure injuries. Patient was education about the benefits of a side-lying position to offload sacrum, declines positioning techniques.

## 2023-01-10 NOTE — PROGRESS NOTE ADULT - SUBJECTIVE AND OBJECTIVE BOX
CARDIOLOGY     PROGRESS  NOTE   ________________________________________________    CHIEF COMPLAINT:Patient is a 57y old  Female who presents with a chief complaint of chest pain (09 Jan 2023 15:50)  still with pain   	  REVIEW OF SYSTEMS:  CONSTITUTIONAL: No fever, weight loss, or fatigue  EYES: No eye pain, visual disturbances, or discharge  ENT:  No difficulty hearing, tinnitus, vertigo; No sinus or throat pain  NECK: No pain or stiffness  RESPIRATORY: No cough, wheezing, chills or hemoptysis; No Shortness of Breath  CARDIOVASCULAR: + chest pain,no palpitations, passing out, dizziness, or leg swelling  GASTROINTESTINAL: No abdominal or epigastric pain. No nausea, vomiting, or hematemesis; No diarrhea or constipation. No melena or hematochezia.  GENITOURINARY: No dysuria, frequency, hematuria, or incontinence  NEUROLOGICAL: No headaches, memory loss, loss of strength, numbness, or tremors  SKIN: No itching, burning, rashes, or lesions   LYMPH Nodes: No enlarged glands  ENDOCRINE: No heat or cold intolerance; No hair loss  MUSCULOSKELETAL: No joint pain or swelling; No muscle, back, or extremity pain  PSYCHIATRIC: No depression, anxiety, mood swings, or difficulty sleeping  HEME/LYMPH: No easy bruising, or bleeding gums  ALLERGY AND IMMUNOLOGIC: No hives or eczema	    [ ] All others negative	  [ ] Unable to obtain    PHYSICAL EXAM:  T(C): 36.3 (01-10-23 @ 05:11), Max: 36.3 (01-09-23 @ 12:08)  HR: 83 (01-10-23 @ 05:11) (72 - 83)  BP: 111/68 (01-10-23 @ 05:11) (106/65 - 119/72)  RR: 18 (01-10-23 @ 05:11) (18 - 18)  SpO2: 98% (01-10-23 @ 05:11) (98% - 100%)  Wt(kg): --  I&O's Summary    08 Jan 2023 07:01  -  09 Jan 2023 07:00  --------------------------------------------------------  IN: 1120 mL / OUT: 601 mL / NET: 519 mL    09 Jan 2023 07:01  -  10 Fabrizio 2023 06:07  --------------------------------------------------------  IN: 240 mL / OUT: 1165 mL / NET: -925 mL        Appearance: Normal	  HEENT:   Normal oral mucosa, PERRL, EOMI	  Lymphatic: No lymphadenopathy  Cardiovascular: Normal S1 S2, No JVD, +murmurs, No edema  Respiratory: Lungs clear to auscultation	  Psychiatry: A & O x 3, Mood & affect appropriate  Gastrointestinal:  Soft, Non-tender, + BS	, +mnephrostomy tube  Skin: No rashes, No ecchymoses, No cyanosis	  Neurologic: Non-focal  Extremities: Normal range of motion, No clubbing, cyanosis or edema  Vascular: Peripheral pulses palpable 2+ bilaterally    MEDICATIONS  (STANDING):  cefTRIAXone   IVPB 1000 milliGRAM(s) IV Intermittent every 24 hours  chlorhexidine 2% Cloths 1 Application(s) Topical <User Schedule>  enoxaparin Injectable 40 milliGRAM(s) SubCutaneous every 24 hours  lactated ringers. 1000 milliLiter(s) (100 mL/Hr) IV Continuous <Continuous>  mirtazapine 15 milliGRAM(s) Oral at bedtime  naloxone Injectable 0.4 milliGRAM(s) IV Push once  polyethylene glycol 3350 17 Gram(s) Oral daily  senna 2 Tablet(s) Oral at bedtime      TELEMETRY: 	    ECG:  	  RADIOLOGY:  OTHER: 	  	  LABS:	 	    CARDIAC MARKERS:                                8.7    15.88 )-----------( 232      ( 09 Jan 2023 07:19 )             28.9     01-09    142  |  105  |  34<H>  ----------------------------<  76  3.2<L>   |  20<L>  |  0.85    Ca    12.6<H>      09 Jan 2023 07:13      proBNP: Serum Pro-Brain Natriuretic Peptide: 510 pg/mL (01-06 @ 19:43)  Serum Pro-Brain Natriuretic Peptide: 202 pg/mL (12-24 @ 05:41)    Lipid Profile:   HgA1c:   TSH:   #Bladder Cancer with Mets  -Onc hx as above  -Recurrence has not been diagnosed   -ECOG of 3/ poor performance status, not eating, continuing to loose weight not a candidate at this time chemotherapy or immunotherapy  -No plan for inpatient chemotherapy or immunotherapy  -CT abd/pelvis/ chest for staging  -if patient agreeable can potentially do Liver biopsy for definitive dx of mets.    #Chronic Anemia   -continue to mon for now today 8.7 trending down since transfusion on 12/24  -Transfuse if <7 or symptomatic   -likely 2ndary to chronic disease   -check B12 and Folate    #Hypercalcemia   -chronic, today 12.6 trending downward  - continue to mon levels ( daily cmp)  -apprec endo rec  -Check PTH RP      Assessment and plan  Calcium, Ionized in AM (01.09.23 @ 07:13)    Calcium, Ionized: 1.75 mmol/L        ---------------------------  Bradycardia, Hypertension, Dyslipidemia, Hydronephrosis, R-nephrostomy tube, Liver cyst, Parathyroid tumor, Chronic pain on morphine 2.5 (last took this morning) and 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 Patient reported the pain is sharp well localized and occasionally pleuritic. Has chronic cough with bloody or brown sputum. Reports feeling dehydrated. Has not had chemo since 5/2022. Patient denied abdominal pain, N/V/D, fever, urinary symptoms, rash  pt with metastatic bladder ca / onc eval  s/p ppm , sss  chest pain doubt cardiac ?metastatic ca bony mets  dvt prophylaxis  severe protein deficiency, nutrition eval   hydration  psych eval start Remeron 15 mg po qhs  pain management  PE has been ruled out several admissions  dvt prophylaxis  nutrition consult severe protein deficiency appreciated  check labs, hypercalcemia, increase fluid,  endocrine eval  ?abx check urin  oncology eval appreciated  physical therapy  replete K, keep K>4

## 2023-01-10 NOTE — PHYSICAL THERAPY INITIAL EVALUATION ADULT - BED MOBILITY TRAINING, PT EVAL
GOAL: Pt will perform all bed mobility with CG x1, in 4 weeks. GOAL: Pt will perform all bed mobility with supervision, in 4 weeks.

## 2023-01-10 NOTE — PHYSICAL THERAPY INITIAL EVALUATION ADULT - GAIT TRAINING, PT EVAL
GOAL: Pt will ambulate 75 feet w/independently, w/use of appropriate assistive device in 4 weeks. GOAL: Pt will ambulate 75 feet w/contact guard assist x1, w/use of appropriate assistive device in 4 weeks.

## 2023-01-10 NOTE — PHYSICAL THERAPY INITIAL EVALUATION ADULT - GENERAL OBSERVATIONS, REHAB EVAL
Pt received supine in bed, A&Ox4, +tele, +IV, +NT, cachetic appearance, emotional and appears anxious requiring reassurance

## 2023-01-11 NOTE — PROGRESS NOTE ADULT - PROBLEM SELECTOR PLAN 1
Current RX:  Morphine oral solution 5 mg q 4 hours prn moderate pain:  two doses / 24 hours  Morphine oral solution 10 mg q 4 hours prn severe pain:  none used  Patient complains of persistent pain, incongruent with the number of PRN's utilized  Educated patient on importance of taking the pain meds when pain is acute and to use the higher dose that is available.  Patient remains reluctant to start long acting meds, although at this junction, Fentanyl likely not able to absorb due to severe cachexia and refusing to take pills

## 2023-01-11 NOTE — PROGRESS NOTE ADULT - SUBJECTIVE AND OBJECTIVE BOX
SUBJECTIVE AND OBJECTIVE:  Sitting up in bed, awake alert and participatory in her care.  Frail appearing , in no acute distress  Indication for Geriatrics and Palliative Care Services/INTERVAL HPI:  pain /symptom management     OVERNIGHT EVENTS:    DNR on chart:  Allergies    No Known Allergies    Intolerances    MEDICATIONS  (STANDING):  calcitonin Injectable 140 International Unit(s) IntraMuscular every 12 hours  cefTRIAXone   IVPB 1000 milliGRAM(s) IV Intermittent every 24 hours  chlorhexidine 2% Cloths 1 Application(s) Topical <User Schedule>  enoxaparin Injectable 40 milliGRAM(s) SubCutaneous every 24 hours  lactated ringers. 1000 milliLiter(s) (120 mL/Hr) IV Continuous <Continuous>  mirtazapine 15 milliGRAM(s) Oral at bedtime  naloxone Injectable 0.4 milliGRAM(s) IV Push once  polyethylene glycol 3350 17 Gram(s) Oral daily  senna 2 Tablet(s) Oral at bedtime    MEDICATIONS  (PRN):  acetaminophen     Tablet .. 325 milliGRAM(s) Oral every 6 hours PRN Mild Pain (1 - 3)  aluminum hydroxide/magnesium hydroxide/simethicone Suspension 30 milliLiter(s) Oral every 4 hours PRN Dyspepsia  melatonin 3 milliGRAM(s) Oral at bedtime PRN Insomnia  morphine   Solution 5 milliGRAM(s) Oral every 4 hours PRN Moderate Pain (4 - 6)  morphine   Solution 10 milliGRAM(s) Oral every 4 hours PRN Severe Pain (7 - 10)  ondansetron Injectable 4 milliGRAM(s) IV Push every 8 hours PRN Nausea and/or Vomiting      ITEMS UNCHECKED ARE NOT PRESENT    PRESENT SYMPTOMS: [ ]Unable to self-report - see [ ] CPOT [ ] PAINADS [ ] RDOS  Source if other than patient:  [ ]Family   [ ]Team     Pain:  [x ]yes [ ]no  QOL impact -   Location -      lower abdomen             Aggravating factors -  movement / positional   Quality - dull achy   Radiation - none  Timing-  Severity (0-10 scale):  7/10 at time of assessment   Minimal acceptable level (0-10 scale):     CPOT:    https://www.sccm.org/getattachment/[email or web address]-6h8y-6806z4937q8q/Critical-Care-Pain-Observation-Tool-(CPOT)    PAIN AD Score:	  http://geriatrictoolkit.[facility]/cog/painad.pdf (Ctrl + left click to view)    Dyspnea:                           [ ]Mild [ ]Moderate [ ]Severe    RDOS:  0 to 2  minimal or no respiratory distress   3  mild distress  4 to 6 moderate distress  >7 severe distress  https://homecareinformation.net/handouts/hen/Respiratory_Distress_Observation_Scale.pdf (Ctrl +  left click to view)     Anxiety:                             [ ]Mild [ x]Moderate [ ]Severe  Fatigue:                             [ ]Mild [x ]Moderate [ ]Severe  Nausea:                             [ ]Mild [ ]Moderate [ ]Severe  Loss of appetite:              [ ]Mild [x ]Moderate [ x]Severe  Constipation:                    [ ]Mild [ ]Moderate [ ]Severe    PCSSQ[Palliative Care Spiritual Screening Question]   Severity (0-10):  3  Score of 4 or > indicate consideration of Chaplaincy referral.  Chaplaincy Referral: [ ] yes [ ] refused [ ] following  deferred xx    Caregiver [place]? : [ ] yes [ ] no  Social work referral [ ] Patient & Family Centered Care Referral [ ]   UNKNOWN     Anticipatory Grief present?:  [ ] yes [ ] no  Social work referral [ ] Patient & Family Centered Care Referral [ ]      Other Symptoms:  [x ]All other review of systems negative     Palliative Performance Status Version 2:        30 %      http://npcrc.org/files/news/palliative_performance_scale_ppsv2.pdf  PHYSICAL EXAM:  Vital Signs Last 24 Hrs  T(C): 36.7 (11 Jan 2023 04:17), Max: 36.7 (11 Jan 2023 04:17)  T(F): 98 (11 Jan 2023 04:17), Max: 98 (11 Jan 2023 04:17)  HR: 77 (11 Jan 2023 04:17) (63 - 78)  BP: 109/66 (11 Jan 2023 04:17) (90/59 - 109/66)  BP(mean): 76 (10 Fabrizio 2023 20:31) (69 - 76)  RR: 18 (11 Jan 2023 04:17) (18 - 18)  SpO2: 100% (11 Jan 2023 04:17) (98% - 100%)    Parameters below as of 11 Jan 2023 04:17  Patient On (Oxygen Delivery Method): nasal cannula  O2 Flow (L/min): 2   I&O's Summary    10 Fabrizio 2023 07:01  -  11 Jan 2023 07:00  --------------------------------------------------------  IN: 2620 mL / OUT: 1000 mL / NET: 1620 mL       GENERAL: [ ]Cachexia    [ x]Alert  [ x]Oriented x 4  [ ]Lethargic  [ ]Unarousable  [x ]Verbal  [ ]Non-Verbal  Behavioral:   [ x]Anxiety  [ ]Delirium [ ]Agitation [ ]Other  HEENT:  [ ]Normal   [x ]Dry mouth   [ ]ET Tube/Trach  [ ]Oral lesions  PULMONARY:   [ x]Clear [ ]Tachypnea  [ ]Audible excessive secretions   [ ]Rhonchi        [ ]Right [ ]Left [ ]Bilateral  [ ]Crackles        [ ]Right [ ]Left [ ]Bilateral  [ ]Wheezing     [ ]Right [ ]Left [ ]Bilateral  [ ]Diminished BS [ ] Right [ ]Left [ ]Bilateral  CARDIOVASCULAR:    [x ]Regular [ ]Irregular [ ]Tachy  [ ]Bradley [ ]Murmur [ ]Other  GASTROINTESTINAL:  [ x]Softly distended   [ ]Distended   [x ]+BS  [ ]Non tender [ ]Tender  [ ]Other [ ]PEG [ ]OGT/ NGT   Last BM:   GENITOURINARY:  [x ]Normal [ ]Incontinent   [ ]Oliguria/Anuria   [ ]Lloyd  MUSCULOSKELETAL:   [ ]Normal   [x ]Weakness  [ ]Bed/Wheelchair bound [ ]Edema  NEUROLOGIC:   [ x]No focal deficits  [ ] Cognitive impairment  [ ] Dysphagia [ ]Dysarthria [ ] Paresis [ ]Other   SKIN:   [ ]Normal  [ ]Rash  [ ]Other  [ x]Pressure ulcer(s) STage 2 sacrum [ ]y [ ]n present on admission    CRITICAL CARE:  [ ]Shock Present  [ ]Septic [ ]Cardiogenic [ ]Neurologic [ ]Hypovolemic  [ ]Vasopressors [ ]Inotropes  [ ]Respiratory failure present [ ]Mechanical Ventilation [ ]Non-invasive ventilatory support [ ]High-Flow   [ ]Acute  [ ]Chronic [ ]Hypoxic  [ ]Hypercarbic [ ]Other  [ ]Other organ failure     LABS:                        9.2    17.85 )-----------( 205      ( 10 Fabrizio 2023 07:11 )             30.0   01-10    142  |  105  |  24<H>  ----------------------------<  68<L>  3.0<L>   |  24  |  0.70    Ca    13.0<HH>      10 Fabrizio 2023 07:09  Mg     1.5     01-10          RADIOLOGY & ADDITIONAL STUDIES:        Protein Calorie Malnutrition Present: [ ]mild [ ]moderate [ ]severe [ ]underweight [ ]morbid obesity  Consult Type: Non Face-to-Face.     Time-Base Billing for Non-Face-to-Face consult (Minutes) 5-10.    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 Recently d/c from  on Dec 21th for SOB. Back in  [facility] on 24th for blood transfusion now presenting to the ED via EMS from outpt office for worsening lethargy and intermittent central chest pain. IR consulted for liver biopsy.     Allergies: NKDA  Medications (Abx/Cardiac/Anticoagulation/Blood Products)  azithromycin  IVPB: 255 mL/Hr IV Intermittent (01-08 @ 21:48)  cefTRIAXone   IVPB: 100 mL/Hr IV Intermittent (01-08 @ 04:34)  cefTRIAXone   IVPB: 100 mL/Hr IV Intermittent (01-09 @ 05:31)  enoxaparin Injectable: 40 milliGRAM(s) SubCutaneous (01-09 @ 13:22)    Data:  T(C): 36.3  HR: 82  BP: 106/65  RR: 18  SpO2: 100%    -WBC 15.88 / HgB 8.7 / Hct 28.9 / Plt 232  -Na 142 / Cl 105 / BUN 34 / Glucose 76  -K 3.2 / CO2 20 / Cr 0.85  -ALT -- / Alk Phos -- / T.Bili --  -INR 1.27 / PTT 30.7    Radiology: pending CT    Assessment/Plan: 57F PMH Asthma, Anemia, Bladder cancer with  metastases to the lungs(patient of Dr Kan) on chronic home O2, Bradycardia, Hypertension, Dyslipidemia, Hydronephrosis, R-nephrostomy tube, Liver cyst, Parathyroid tumor, Chronic pain on morphine 2.5 (last took this morning) and 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 Recently d/c from  on Dec 21th for SOB. Back in  [facility] on 24th for blood transfusion now presenting to the ED via EMS from outpt office for worsening lethargy and intermittent central chest pain. IR consulted for liver biopsy.     **consult in progress**  < from: CT Abdomen and Pelvis No Cont (10.17.22 @ 17:54) >    ACC: [id number] EXAM:  CT CHEST                        ACC: [id number] EXAM:  CT ABDOMEN AND PELVIS                          PROCEDURE DATE:  10/17/2022          INTERPRETATION:  CLINICAL INFORMATION: Left hip and left back pain.   Concern for metastasis or stones.    COMPARISON: CT chest, abdomen and pelvis from 9/20/2022.    CONTRAST/COMPLICATIONS:  IV Contrast: None.  Oral Contrast: None.  Complications: None reported.    PROCEDURE:  CT of the Chest, Abdomen and Pelvis was performed.  Sagittal and coronal reformats were performed.    FINDINGS:  CHEST:  LUNGS AND LARGE AIRWAYS: Redemonstration of innumerable bilateral   pulmonary nodules consistent with metastases. A few of the largest   pulmonary nodules are mildly increased in size. For example, left lower   lobe nodule measures 3.3 x 2.3 cm (2:116), previously 2.8 x 2.0 cm on   9/20/2022 . A right lower lobe nodule measures 2.4 x 2.2 cm (2:103),   previously 2.2 x 2.1 cm.  PLEURA: No pleural effusion.  VESSELS: Within normal limits.  HEART: Heart size is normal. Small pericardial effusion.  MEDIASTINUM AND FLORENTIN: No lymphadenopathy.  CHEST WALL AND LOWER NECK: Left chest wall pacemaker with leads in the   right atrium and ventricle.    ABDOMEN AND PELVIS:  LIVER: Interval increase in size of hepatic metastases. For reference,   segment 7 mass measures 4.0 x 2.3 cm (2:148), previously 3.3 x 2.2 cm.  BILE DUCTS: Normal caliber.  GALLBLADDER: Within normal limits.  SPLEEN: Within normal limits.  PANCREAS: Within normal limits.  ADRENALS: Within normal limits.  KIDNEYS/URETERS: Right percutaneous nephrostomy catheter; no right   hydronephrosis. 0.4 cm nonobstructing stone in the right lower pole. Left   kidney is enlarged with severe hydroureteronephrosis and markedly dilated   calyces, unchanged. There are nonobstructive stones of the left kidney.    BLADDER: Large bladder mass is without significant change.  REPRODUCTIVE ORGANS: Uterus and adnexa within normal limits.    BOWEL: No bowel obstruction. Appendix is normal.  PERITONEUM: No ascites.  VESSELS: Within normal limits.  RETROPERITONEUM/LYMPH NODES: Retroperitoneal and pelvic lymphadenopathy   is unchanged.  ABDOMINAL WALL: Within normal limits.  BONES: No evidence of osseous metastatic disease.    IMPRESSION:  1. Large bladder mass is without significant change. Severe chronic   hydronephrosis of the left kidney is unchanged. Right nephrostomy tube in   place; no right hydronephrosis.  2.  Numerous bilateral pulmonary metastases; mild interval increase in   size of a few of the largest pulmonary metastases compared to 9/20/2022.  3.  Progression of liver metastases.  4.  No evidence of osseous metastatic disease.        --- End of Report ---    < end of copied text >  [email or web address].[email or web address]/[email or web address]_Clinical%20Characteristics%20to%[email or web address]-[email or web address]%20et%[email or web address]    Height (cm): 162.6 (01-07-23 @ 23:00), 162.6 (12-23-22 @ 22:59), 162.6 (12-01-22 @ 15:25)  Weight (kg): 36 (01-07-23 @ 23:00), 33.6 (12-23-22 @ 22:59), 35.2986 (12-23-22 @ 10:00)  BMI (kg/m2): 13.6 (01-07-23 @ 23:00), 12.7 (12-23-22 @ 22:59), 13.4 (12-23-22 @ 10:00)    [ ]PPSV2 < or = 30%  [ ]significant weight loss [x ]poor nutritional intake [ ]anasarca[ ]Artificial Nutrition    Other REFERRALS:  [ ]Hospice  [ ]Child Life  [ ]Social Work  [ x]Case management [ ]Holistic Therapy

## 2023-01-11 NOTE — PROGRESS NOTE ADULT - ASSESSMENT
( Note written / Date of service 01-11-23 )    ==================>> MEDICATIONS <<====================    cefTRIAXone   IVPB 1000 milliGRAM(s) IV Intermittent every 24 hours  chlorhexidine 2% Cloths 1 Application(s) Topical <User Schedule>  enoxaparin Injectable 40 milliGRAM(s) SubCutaneous every 24 hours  lactated ringers. 1000 milliLiter(s) IV Continuous <Continuous>  mirtazapine 15 milliGRAM(s) Oral at bedtime  naloxone Injectable 0.4 milliGRAM(s) IV Push once  polyethylene glycol 3350 17 Gram(s) Oral daily  senna 2 Tablet(s) Oral at bedtime    MEDICATIONS  (PRN):  acetaminophen     Tablet .. 325 milliGRAM(s) Oral every 6 hours PRN Mild Pain (1 - 3)  aluminum hydroxide/magnesium hydroxide/simethicone Suspension 30 milliLiter(s) Oral every 4 hours PRN Dyspepsia  melatonin 3 milliGRAM(s) Oral at bedtime PRN Insomnia  morphine   Solution 5 milliGRAM(s) Oral every 4 hours PRN Moderate Pain (4 - 6)  morphine   Solution 10 milliGRAM(s) Oral every 4 hours PRN Severe Pain (7 - 10)  ondansetron Injectable 4 milliGRAM(s) IV Push every 8 hours PRN Nausea and/or Vomiting    ___________  Active diet:  Diet, Soft and Bite Sized:   Nikita(7 Gm Arginine/7 Gm Glut/1.2 Gm HMB     Qty per Day:  1  Liquid Protein Supplement     Qty per Day:  1  Supplement Feeding Modality:  Oral  Ensure Plus High Protein Cans or Servings Per Day:  2       Frequency:  Two Times a day  Ensure Plant-Based Cans or Servings Per Day:  1       Frequency:  Daily  ___________________    ==================>> VITAL SIGNS <<==================    Vital Signs Last 24 HrsT(C): 36.4 (01-11-23 @ 12:46)  T(F): 97.6 (01-11-23 @ 12:46), Max: 98 (01-11-23 @ 04:17)  HR: 80 (01-11-23 @ 12:46) (63 - 80)  BP: 96/60 (01-11-23 @ 12:46)  RR: 18 (01-11-23 @ 12:46) (18 - 18)  SpO2: 99% (01-11-23 @ 12:46) (98% - 100%)      CAPILLARY BLOOD GLUCOSE         ==================>> LAB AND IMAGING <<==================                        9.2    17.85 )-----------( 205      ( 10 Fabrizio 2023 07:11 )             30.0        01-10    142  |  105  |  24<H>  ----------------------------<  68<L>  3.0<L>   |  24  |  0.70    Ca    13.0<HH>      10 Fabrizio 2023 07:09  Mg     1.5     01-10      WBC count:   17.85 <<== ,  15.88 <<== ,  16.41 <<== ,  16.51 <<==   Hemoglobin:   9.2 <<==,  8.7 <<==,  8.9 <<==,  9.6 <<==  platelets:  205 <==, 232 <==, 260 <==, 316 <==, 225 <==    Creatinine:  0.70  <<==, 0.85  <<==, 0.97  <<==, 0.99  <<==, 1.04  <<==  Sodium:   142  <==, 142  <==, 141  <==, 138  <==, 139  <==       AST:          24 <== , 26 <==      ALT:        18  <== , 19  <==      AP:        280  <=, 339  <=     Bili:        0.2  <=, 0.4  <=    ____________________________    M I C R O B I O L O G Y :    Culture - Urine (collected 09 Jan 2023 21:36)  Source: Clean Catch Clean Catch (Midstream)  Final Report (10 Fabrizio 2023 23:13):    No growth        SARS-CoV-2: [facility] (01-06-23 @ 20:40)  COVID-19 PCR: [facility] (12-23-22 @ 23:54)     _________________________________________________________________________________________  ========>>  M E D I C A L   A T T E N D I N G    F O L L O W  U P  N O T E  <<=========  -----------------------------------------------------------------------------------------------------    - Patient seen and examined by me earlier today.   - In summary,  JEF HOUSE is a 57y year old woman admitted with chest discomfort   - Patient today overall doing ok, comfortable, eating fairly > encouraged as patient appears very weak , supplements ordered, at bedside..     ==================>> REVIEW OF SYSTEM <<=================    GEN: no fever, no chills, chronic pain in back / flank stable / controlled   RESP: no SOB, no cough, no sputum  CVS: no chest pain now reported , no palpitations  GI: no abdominal pain, no nausea, chronic constipation  : no dysuria, no frequency, chronic hematuria, chronic vaginal bleeding   Neuro: no headache, no dizziness  Derm : no itching, no rash    ==================>> PHYSICAL EXAM <<=================    GEN: A&O X 3 , NAD , comfortable, pleasant, calm , cachectic, in bed   HEENT: NCAT, PERRL, MMM, hearing intact, temporal wasting   Neck: supple , no JVD appreciated  CVS: S1S2 , regular , No M/R/G appreciated  PULM: CTA B/L,  no W/R/R appreciated  ABD.: soft. non tender, non distended  Extrem: intact pulses , trace ankle edema , cachectic . + nephrostomy tube with urine draining   PSYCH : flat affect, not anxious                             ( Note written / Date of service 01-11-23 )    ==================>> MEDICATIONS <<====================    cefTRIAXone   IVPB 1000 milliGRAM(s) IV Intermittent every 24 hours  chlorhexidine 2% Cloths 1 Application(s) Topical <User Schedule>  enoxaparin Injectable 40 milliGRAM(s) SubCutaneous every 24 hours  lactated ringers. 1000 milliLiter(s) IV Continuous <Continuous>  mirtazapine 15 milliGRAM(s) Oral at bedtime  naloxone Injectable 0.4 milliGRAM(s) IV Push once  polyethylene glycol 3350 17 Gram(s) Oral daily  senna 2 Tablet(s) Oral at bedtime    MEDICATIONS  (PRN):  acetaminophen     Tablet .. 325 milliGRAM(s) Oral every 6 hours PRN Mild Pain (1 - 3)  aluminum hydroxide/magnesium hydroxide/simethicone Suspension 30 milliLiter(s) Oral every 4 hours PRN Dyspepsia  melatonin 3 milliGRAM(s) Oral at bedtime PRN Insomnia  morphine   Solution 5 milliGRAM(s) Oral every 4 hours PRN Moderate Pain (4 - 6)  morphine   Solution 10 milliGRAM(s) Oral every 4 hours PRN Severe Pain (7 - 10)  ondansetron Injectable 4 milliGRAM(s) IV Push every 8 hours PRN Nausea and/or Vomiting    ___________  Active diet:  Diet, Soft and Bite Sized:   Nikita(7 Gm Arginine/7 Gm Glut/1.2 Gm HMB     Qty per Day:  1  Liquid Protein Supplement     Qty per Day:  1  Supplement Feeding Modality:  Oral  Ensure Plus High Protein Cans or Servings Per Day:  2       Frequency:  Two Times a day  Ensure Plant-Based Cans or Servings Per Day:  1       Frequency:  Daily  ___________________    ==================>> VITAL SIGNS <<==================    Vital Signs Last 24 HrsT(C): 36.4 (01-11-23 @ 12:46)  T(F): 97.6 (01-11-23 @ 12:46), Max: 98 (01-11-23 @ 04:17)  HR: 80 (01-11-23 @ 12:46) (63 - 80)  BP: 96/60 (01-11-23 @ 12:46)  RR: 18 (01-11-23 @ 12:46) (18 - 18)  SpO2: 99% (01-11-23 @ 12:46) (98% - 100%)       ==================>> LAB AND IMAGING <<==================                        9.2    17.85 )-----------( 205      ( 10 Fabrizio 2023 07:11 )             30.0        01-10    142  |  105  |  24<H>  ----------------------------<  68<L>  3.0<L>   |  24  |  0.70    Ca    13.0<HH>      10 Fabrizio 2023 07:09  Mg     1.5     01-10      WBC count:   17.85 <<== ,  15.88 <<== ,  16.41 <<== ,  16.51 <<==   Hemoglobin:   9.2 <<==,  8.7 <<==,  8.9 <<==,  9.6 <<==  platelets:  205 <==, 232 <==, 260 <==, 316 <==, 225 <==    Creatinine:  0.70  <<==, 0.85  <<==, 0.97  <<==, 0.99  <<==, 1.04  <<==  Sodium:   142  <==, 142  <==, 141  <==, 138  <==, 139  <==       AST:          24 <== , 26 <==      ALT:        18  <== , 19  <==      AP:        280  <=, 339  <=     Bili:        0.2  <=, 0.4  <=    ___________________________________________________________________________________  ===============>>  A S S E S S M E N T   A N D   P L A N <<===============  ------------------------------------------------------------------------------------------    · Assessment	  56 y/o woman with PMH Asthma, Anemia, Bladder cancer with metastases to the lungs, direct invasion to vagina, on chronic home O2 for comfort, Bradycardia / AF post PPM, Dyslipidemia, Hydronephrosis, R-nephrostomy tube, Liver cyst, Parathyroid tumor, Chronic pain on morphine sulfate at home ( not on hospice) presenting to the ED via EMS from oncologist office for worsening lethargy and intermittent central chest pain. Found to have profound hypercalcemia.     Problem/Plan - 1:  ·  Problem: Hypercalcemia of malignancy  -c/w IV hydration as calcium downtrending   -s/p calcitonin in ED >> repeat dose per Onc discussion and Bisphosphonate done   -heme/onc f/u  - trend CMP    Problem/Plan - 2:  ·  Problem: Leukocytosis.   ·  Plan: -chronic  however with reported productive cough with pleuritic chest pain and elevated PCT     under treatment empirically for possible PNA ( patient declined to have an xray )!   -trend WBC   -c/w ceftriaxone 5 doses and DC     Problem/Plan - 3:  ·  Problem: Bladder cancer.   ·  Plan: -no chemo since 05/2022  -heme/onc f/u     plan as OP was to have another biopsy   onc appreciated  plan for liver biopsy when patient stable / better.. ?    Problem/Plan - 4:  ·  Problem: Prophylactic measure.   ·  Plan: -Diet: Regular  -DVT ppx: Lovenox qd.      --------------------------------------------  Case discussed with patient, NP,..   Education given on findings and plan of care  ___________________________  H. NIEVES Ochoa.  Pager: [phone]

## 2023-01-11 NOTE — PROGRESS NOTE ADULT - SUBJECTIVE AND OBJECTIVE BOX
CARDIOLOGY     PROGRESS  NOTE   ________________________________________________    CHIEF COMPLAINT:Patient is a 57y old  Female who presents with a chief complaint of chest pain (10 Fabrizio 2023 18:52)  still with pain all over  	  REVIEW OF SYSTEMS:  CONSTITUTIONAL: No fever, weight loss, or fatigue  EYES: No eye pain, visual disturbances, or discharge  ENT:  No difficulty hearing, tinnitus, vertigo; No sinus or throat pain  NECK: No pain or stiffness  RESPIRATORY: No cough, wheezing, chills or hemoptysis; No Shortness of Breath  CARDIOVASCULAR: No chest pain, palpitations, passing out, dizziness, or leg swelling  GASTROINTESTINAL: No abdominal or epigastric pain. No nausea, vomiting, or hematemesis; No diarrhea or constipation. No melena or hematochezia.  GENITOURINARY: No dysuria, frequency, hematuria, or incontinence  NEUROLOGICAL: No headaches, memory loss, loss of strength, numbness, or tremors  SKIN: No itching, burning, rashes, or lesions   LYMPH Nodes: No enlarged glands  ENDOCRINE: No heat or cold intolerance; No hair loss  MUSCULOSKELETAL: No joint pain or swelling; No muscle, back, or extremity pain  PSYCHIATRIC: No depression, anxiety, mood swings, or difficulty sleeping  HEME/LYMPH: No easy bruising, or bleeding gums  ALLERGY AND IMMUNOLOGIC: No hives or eczema	    [ ] All others negative	  [ ] Unable to obtain    PHYSICAL EXAM:  T(C): 36.7 (01-11-23 @ 04:17), Max: 36.7 (01-11-23 @ 04:17)  HR: 77 (01-11-23 @ 04:17) (63 - 78)  BP: 109/66 (01-11-23 @ 04:17) (90/59 - 109/66)  RR: 18 (01-11-23 @ 04:17) (18 - 18)  SpO2: 100% (01-11-23 @ 04:17) (98% - 100%)  Wt(kg): --  I&O's Summary    10 Fabrizio 2023 07:01  -  11 Jan 2023 07:00  --------------------------------------------------------  IN: 1070 mL / OUT: 1000 mL / NET: 70 mL        Appearance: cachectic  HEENT:   Normal oral mucosa, PERRL, EOMI	  Lymphatic: No lymphadenopathy  Cardiovascular: Normal S1 S2, No JVD, + murmurs, No edema  Respiratory: Lungs clear to auscultation	  Psychiatry: A & O x 3, Mood & affect appropriate  Gastrointestinal:  Soft, Non-tender, + BS	  Skin: No rashes, No ecchymoses, No cyanosis	  Neurologic: Non-focal  Extremities: Normal range of motion, No clubbing, cyanosis or edema  Vascular: Peripheral pulses palpable 2+ bilaterally    MEDICATIONS  (STANDING):  calcitonin Injectable 140 International Unit(s) IntraMuscular every 12 hours  cefTRIAXone   IVPB 1000 milliGRAM(s) IV Intermittent every 24 hours  chlorhexidine 2% Cloths 1 Application(s) Topical <User Schedule>  enoxaparin Injectable 40 milliGRAM(s) SubCutaneous every 24 hours  lactated ringers. 1000 milliLiter(s) (120 mL/Hr) IV Continuous <Continuous>  mirtazapine 15 milliGRAM(s) Oral at bedtime  naloxone Injectable 0.4 milliGRAM(s) IV Push once  polyethylene glycol 3350 17 Gram(s) Oral daily  senna 2 Tablet(s) Oral at bedtime      TELEMETRY: 	    ECG:  	  RADIOLOGY:  OTHER: 	  	  LABS:	 	    CARDIAC MARKERS:                                9.2    17.85 )-----------( 205      ( 10 Fabrizio 2023 07:11 )             30.0     01-10    142  |  105  |  24<H>  ----------------------------<  68<L>  3.0<L>   |  24  |  0.70    Ca    13.0<HH>      10 Fabrizio 2023 07:09  Mg     1.5     01-10      proBNP: Serum Pro-Brain Natriuretic Peptide: 510 pg/mL (01-06 @ 19:43)  Serum Pro-Brain Natriuretic Peptide: 202 pg/mL (12-24 @ 05:41)    Lipid Profile:   HgA1c:   TSH:     Parathyroid Hormone Intact, Serum (01.06.23 @ 22:37)    Calcium.: 14.0: Test Repeated  Critical value mg/dL    Intact PTH: 11: Test Repeated  PTH METHOD: Roche  Guide for Interpretation of PTH and Calcium Results                           Calcium             PTH                           MG/DL               PG/ML  Normal                   8.4-10.5            15-65  Primary  Hyperparathyroidism      >10.5               >50  Non-PTH Hypercalcemia    >10.5               0-20  Hypoparathyroidism       <8.4                0-20  Pseudohypoparathyroid    <8.4                >50  This is intended as a guide only. Factors such assunlight exposure,  Vitamin D status and renal function should be evaluated along with  clinical presentation. pg/mL          Calcium, Ionized in AM (01.10.23 @ 07:25)    Calcium, Ionized: 1.82 mmol/L    Assessment and plan  ---------------------------  Bradycardia, Hypertension, Dyslipidemia, Hydronephrosis, R-nephrostomy tube, Liver cyst, Parathyroid tumor, Chronic pain on morphine 2.5 (last took this morning) and 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 Patient reported the pain is sharp well localized and occasionally pleuritic. Has chronic cough with bloody or brown sputum. Reports feeling dehydrated. Has not had chemo since 5/2022. Patient denied abdominal pain, N/V/D, fever, urinary symptoms, rash  pt with metastatic bladder ca / onc eval  s/p ppm , sss  chest pain doubt cardiac ?metastatic ca bony mets  dvt prophylaxis  severe protein deficiency, nutrition eval   hydration  psych eval start Remeron 15 mg po qhs  pain management  PE has been ruled out several admissions  dvt prophylaxis  nutrition consult severe protein deficiency appreciated  check labs, hypercalcemia, increase fluid,  endocrine eval noted  ?abx check urin  oncology eval appreciated, awaiting ct chest abdomen and pelvis  physical therapy  replete K, keep K>4  doubt pt is a candidate for chemo being so malnurish

## 2023-01-12 NOTE — OCCUPATIONAL THERAPY INITIAL EVALUATION ADULT - LIVES WITH, PROFILE
Pt states she lives in private house with sister, I in ADLs and ambulation prior to admission, sister helps as needed

## 2023-01-12 NOTE — PROGRESS NOTE ADULT - SUBJECTIVE AND OBJECTIVE BOX
SUBJECTIVE AND OBJECTIVE:  Sitting up in bed, awake alert and participatory in her care.  Frail appearing , in no acute distress  Indication for Geriatrics and Palliative Care Services/INTERVAL HPI:  pain /symptom management     OVERNIGHT EVENTS: no acute event overnight. Pt required 4 doses    DNR on chart:  Allergies    No Known Allergies    Intolerances    MEDICATIONS  (STANDING):  cefTRIAXone   IVPB 1000 milliGRAM(s) IV Intermittent every 24 hours  chlorhexidine 2% Cloths 1 Application(s) Topical <User Schedule>  enoxaparin Injectable 40 milliGRAM(s) SubCutaneous every 24 hours  lactated ringers. 1000 milliLiter(s) (120 mL/Hr) IV Continuous <Continuous>  mirtazapine 15 milliGRAM(s) Oral at bedtime  morphine   Solution 5 milliGRAM(s) Oral every 4 hours  naloxone Injectable 0.4 milliGRAM(s) IV Push once  polyethylene glycol 3350 17 Gram(s) Oral daily  senna 2 Tablet(s) Oral at bedtime    MEDICATIONS  (PRN):  acetaminophen     Tablet .. 325 milliGRAM(s) Oral every 6 hours PRN Mild Pain (1 - 3)  aluminum hydroxide/magnesium hydroxide/simethicone Suspension 30 milliLiter(s) Oral every 4 hours PRN Dyspepsia  melatonin 3 milliGRAM(s) Oral at bedtime PRN Insomnia  morphine   Solution 10 milliGRAM(s) Oral every 4 hours PRN Severe Pain (7 - 10)  morphine   Solution 5 milliGRAM(s) Oral every 4 hours PRN Moderate Pain (4 - 6)  ondansetron Injectable 4 milliGRAM(s) IV Push every 8 hours PRN Nausea and/or Vomiting        ITEMS UNCHECKED ARE NOT PRESENT    PRESENT SYMPTOMS: [ ]Unable to self-report - see [ ] CPOT [ ] PAINADS [ ] RDOS  Source if other than patient:  [ ]Family   [ ]Team     Pain:  [x ]yes [ ]no  QOL impact -   Location -      lower abdomen             Aggravating factors -  movement / positional   Quality - dull achy   Radiation - none  Timing-  Severity (0-10 scale):  7/10 at time of assessment   Minimal acceptable level (0-10 scale):     CPOT:    https://www.sccm.org/getattachment/smt45a59-5i6q-5o2c-8o3d-3369y1384p0j/Critical-Care-Pain-Observation-Tool-(CPOT)    PAIN AD Score:	  http://geriatrictoolkit.[facility]/cog/painad.pdf (Ctrl + left click to view)    Dyspnea:                           [ ]Mild [ ]Moderate [ ]Severe    RDOS:  0 to 2  minimal or no respiratory distress   3  mild distress  4 to 6 moderate distress  >7 severe distress  https://homecareinformation.net/handouts/hen/Respiratory_Distress_Observation_Scale.pdf (Ctrl +  left click to view)     Anxiety:                             [ ]Mild [ x]Moderate [ ]Severe  Fatigue:                             [ ]Mild [x ]Moderate [ ]Severe  Nausea:                            [ ]Mild [ ]Moderate [ ]Severe  Loss of appetite:               [ ]Mild [x ]Moderate [ x]Severe  Constipation:                    [ ]Mild [ ]Moderate [ ]Severe    PCSSQ[Palliative Care Spiritual Screening Question]   Severity (0-10):  3  Score of 4 or > indicate consideration of Chaplaincy referral.  Chaplaincy Referral: [ ] yes [ ] refused [ ] following  deferred xx    Caregiver [place]? : [ ] yes [ ] no  Social work referral [ ] Patient & Family Centered Care Referral [ ]   UNKNOWN     Anticipatory Grief present?:  [ ] yes [ ] no  Social work referral [ ] Patient & Family Centered Care Referral [ ]      Other Symptoms:  [x ]All other review of systems negative     Palliative Performance Status Version 2:        30 %      http://npcrc.org/files/news/palliative_performance_scale_ppsv2.pdf  PHYSICAL EXAM:    Vital Signs Last 24 Hrs  T(C): 36.2 (01-12-23 @ 05:20), Max: 36.7 (01-11-23 @ 20:20)  T(F): 97.2 (01-12-23 @ 05:20), Max: 98 (01-11-23 @ 20:20)  HR: 65 (01-12-23 @ 05:20) (58 - 80)  BP: 113/66 (01-12-23 @ 05:20) (96/60 - 113/66)  BP(mean): --  RR: 18 (01-12-23 @ 05:20) (18 - 18)  SpO2: 100% (01-12-23 @ 05:20) (99% - 100%)         GENERAL: [ ]Cachexia    [ x]Alert  [ x]Oriented x 4  [ ]Lethargic  [ ]Unarousable  [x ]Verbal  [ ]Non-Verbal  Behavioral:   [ x]Anxiety  [ ]Delirium [ ]Agitation [ ]Other  HEENT:  [ ]Normal   [x ]Dry mouth   [ ]ET Tube/Trach  [ ]Oral lesions  PULMONARY:   [ x]Clear [ ]Tachypnea  [ ]Audible excessive secretions   [ ]Rhonchi        [ ]Right [ ]Left [ ]Bilateral  [ ]Crackles        [ ]Right [ ]Left [ ]Bilateral  [ ]Wheezing     [ ]Right [ ]Left [ ]Bilateral  [ ]Diminished BS [ ] Right [ ]Left [ ]Bilateral  CARDIOVASCULAR:    [x ]Regular [ ]Irregular [ ]Tachy  [ ]Bradley [ ]Murmur [ ]Other  GASTROINTESTINAL:  [ x]Softly distended   [ ]Distended   [x ]+BS  [ ]Non tender [ ]Tender  [ ]Other [ ]PEG [ ]OGT/ NGT   Last BM:   GENITOURINARY:  [x ]Normal [ ]Incontinent   [ ]Oliguria/Anuria   [ ]Lloyd  MUSCULOSKELETAL:   [ ]Normal   [x ]Weakness  [ ]Bed/Wheelchair bound [ ]Edema  NEUROLOGIC:   [ x]No focal deficits  [ ] Cognitive impairment  [ ] Dysphagia [ ]Dysarthria [ ] Paresis [ ]Other   SKIN:   [ ]Normal  [ ]Rash  [ ]Other  [ x]Pressure ulcer(s) Stage 2 sacrum [ ]y [ ]n present on admission    CRITICAL CARE:  [ ]Shock Present  [ ]Septic [ ]Cardiogenic [ ]Neurologic [ ]Hypovolemic  [ ]Vasopressors [ ]Inotropes  [ ]Respiratory failure present [ ]Mechanical Ventilation [ ]Non-invasive ventilatory support [ ]High-Flow   [ ]Acute  [ ]Chronic [ ]Hypoxic  [ ]Hypercarbic [ ]Other  [ ]Other organ failure     LABS:          RADIOLOGY & ADDITIONAL STUDIES:        Protein Calorie Malnutrition Present: [ ]mild [ ]moderate [ ]severe [ ]underweight [ ]morbid obesity  Consult Type: Non Face-to-Face.     Time-Base Billing for Non-Face-to-Face consult (Minutes) 5-10.    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 Recently d/c from  on Dec 21th for SOB. Back in  ER on 24th for blood transfusion now presenting to the ED via EMS from outpt office for worsening lethargy and intermittent central chest pain. IR consulted for liver biopsy.     Allergies: NKDA  Medications (Abx/Cardiac/Anticoagulation/Blood Products)  azithromycin  IVPB: 255 mL/Hr IV Intermittent (01-08 @ 21:48)  cefTRIAXone   IVPB: 100 mL/Hr IV Intermittent (01-08 @ 04:34)  cefTRIAXone   IVPB: 100 mL/Hr IV Intermittent (01-09 @ 05:31)  enoxaparin Injectable: 40 milliGRAM(s) SubCutaneous (01-09 @ 13:22)    Data:  T(C): 36.3  HR: 82  BP: 106/65  RR: 18  SpO2: 100%    -WBC 15.88 / HgB 8.7 / Hct 28.9 / Plt 232  -Na 142 / Cl 105 / BUN 34 / Glucose 76  -K 3.2 / CO2 20 / Cr 0.85  -ALT -- / Alk Phos -- / T.Bili --  -INR 1.27 / PTT 30.7    Radiology: pending CT    Assessment/Plan: 57F PMH Asthma, Anemia, Bladder cancer with  metastases to the lungs(patient of Dr Kan) on chronic home O2, Bradycardia, Hypertension, Dyslipidemia, Hydronephrosis, R-nephrostomy tube, Liver cyst, Parathyroid tumor, Chronic pain on morphine 2.5 (last took this morning) and 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 Recently d/c from  on Dec 21th for SOB. Back in  ER on 24th for blood transfusion now presenting to the ED via EMS from outpt office for worsening lethargy and intermittent central chest pain. IR consulted for liver biopsy.     **consult in progress**  < from: CT Abdomen and Pelvis No Cont (10.17.22 @ 17:54) >    ACC: [id number] EXAM:  CT CHEST                        ACC: [id number] EXAM:  CT ABDOMEN AND PELVIS                          PROCEDURE DATE:  10/17/2022          INTERPRETATION:  CLINICAL INFORMATION: Left hip and left back pain.   Concern for metastasis or stones.    COMPARISON: CT chest, abdomen and pelvis from 9/20/2022.    CONTRAST/COMPLICATIONS:  IV Contrast: None.  Oral Contrast: None.  Complications: None reported.    PROCEDURE:  CT of the Chest, Abdomen and Pelvis was performed.  Sagittal and coronal reformats were performed.    FINDINGS:  CHEST:  LUNGS AND LARGE AIRWAYS: Redemonstration of innumerable bilateral   pulmonary nodules consistent with metastases. A few of the largest   pulmonary nodules are mildly increased in size. For example, left lower   lobe nodule measures 3.3 x 2.3 cm (2:116), previously 2.8 x 2.0 cm on   9/20/2022 . A right lower lobe nodule measures 2.4 x 2.2 cm (2:103),   previously 2.2 x 2.1 cm.  PLEURA: No pleural effusion.  VESSELS: Within normal limits.  HEART: Heart size is normal. Small pericardial effusion.  MEDIASTINUM AND FLORENTIN: No lymphadenopathy.  CHEST WALL AND LOWER NECK: Left chest wall pacemaker with leads in the   right atrium and ventricle.    ABDOMEN AND PELVIS:  LIVER: Interval increase in size of hepatic metastases. For reference,   segment 7 mass measures 4.0 x 2.3 cm (2:148), previously 3.3 x 2.2 cm.  BILE DUCTS: Normal caliber.  GALLBLADDER: Within normal limits.  SPLEEN: Within normal limits.  PANCREAS: Within normal limits.  ADRENALS: Within normal limits.  KIDNEYS/URETERS: Right percutaneous nephrostomy catheter; no right   hydronephrosis. 0.4 cm non obstructing stone in the right lower pole. Left   kidney is enlarged with severe hydroureteronephrosis and markedly dilated   calyces, unchanged. There are nonobstructive stones of the left kidney.    BLADDER: Large bladder mass is without significant change.  REPRODUCTIVE ORGANS: Uterus and adnexa within normal limits.    BOWEL: No bowel obstruction. Appendix is normal.  PERITONEUM: No ascites.  VESSELS: Within normal limits.  RETROPERITONEUM/LYMPH NODES: Retroperitoneal and pelvic lymphadenopathy   is unchanged.  ABDOMINAL WALL: Within normal limits.  BONES: No evidence of osseous metastatic disease.    IMPRESSION:  1. Large bladder mass is without significant change. Severe chronic   hydronephrosis of the left kidney is unchanged. Right nephrostomy tube in   place; no right hydronephrosis.  2.  Numerous bilateral pulmonary metastases; mild interval increase in   size of a few of the largest pulmonary metastases compared to 9/20/2022.  3.  Progression of liver metastases.  4.  No evidence of osseous metastatic disease.        --- End of Report ---    < end of copied text >  https://www.andeal.org/vault/0357/web/files/ONC/Table_Clinical%20Characteristics%20to%20Document%20Malnutrition-White%20JV%20et%20al%202012.pdf    Height (cm): 162.6 (01-07-23 @ 23:00), 162.6 (12-23-22 @ 22:59), 162.6 (12-01-22 @ 15:25)  Weight (kg): 36 (01-07-23 @ 23:00), 33.6 (12-23-22 @ 22:59), 35.2986 (12-23-22 @ 10:00)  BMI (kg/m2): 13.6 (01-07-23 @ 23:00), 12.7 (12-23-22 @ 22:59), 13.4 (12-23-22 @ 10:00)    [ ]PPSV2 < or = 30%  [ ]significant weight loss [x ]poor nutritional intake [ ]anasarca[ ]Artificial Nutrition    Other REFERRALS:  [ ]Hospice  [ ]Child Life  [ ]Social Work  [ x]Case management [ ]Holistic Therapy

## 2023-01-12 NOTE — PROGRESS NOTE ADULT - PROBLEM SELECTOR PLAN 1
Current RX:  add Morphine sol 5mg po q4 ATC  Morphine oral solution 5 mg q 4 hours prn moderate pain:  4 doses / 24 hours  Morphine oral solution 10 mg q 4 hours prn severe pain:  none used  Patient complains of persistent pain, incongruent with the number of PRN's utilized  Educated patient on importance of taking the pain meds when pain is acute and to use the higher dose that is available.  Patient remains reluctant to start long acting meds, although at this junction, Fentanyl likely not able to absorb due to severe cachexia and refusing to take pills

## 2023-01-12 NOTE — PROGRESS NOTE ADULT - ASSESSMENT
( Note written / Date of service 01-12-23 )    ==================>> MEDICATIONS <<====================    cefTRIAXone   IVPB 1000 milliGRAM(s) IV Intermittent every 24 hours  chlorhexidine 2% Cloths 1 Application(s) Topical <User Schedule>  enoxaparin Injectable 40 milliGRAM(s) SubCutaneous every 24 hours  lactated ringers. 1000 milliLiter(s) IV Continuous <Continuous>  mirtazapine 15 milliGRAM(s) Oral at bedtime  morphine   Solution 5 milliGRAM(s) Oral every 4 hours  naloxone Injectable 0.4 milliGRAM(s) IV Push once  polyethylene glycol 3350 17 Gram(s) Oral daily  senna 2 Tablet(s) Oral at bedtime    MEDICATIONS  (PRN):  acetaminophen     Tablet .. 325 milliGRAM(s) Oral every 6 hours PRN Mild Pain (1 - 3)  aluminum hydroxide/magnesium hydroxide/simethicone Suspension 30 milliLiter(s) Oral every 4 hours PRN Dyspepsia  melatonin 3 milliGRAM(s) Oral at bedtime PRN Insomnia  morphine   Solution 10 milliGRAM(s) Oral every 4 hours PRN Severe Pain (7 - 10)  morphine   Solution 5 milliGRAM(s) Oral every 4 hours PRN Moderate Pain (4 - 6)  ondansetron Injectable 4 milliGRAM(s) IV Push every 8 hours PRN Nausea and/or Vomiting    ___________  Active diet:  Diet, Soft and Bite Sized:   Nikita(7 Gm Arginine/7 Gm Glut/1.2 Gm HMB     Qty per Day:  1  Liquid Protein Supplement     Qty per Day:  1  Supplement Feeding Modality:  Oral  Ensure Plus High Protein Cans or Servings Per Day:  2       Frequency:  Two Times a day  Ensure Plant-Based Cans or Servings Per Day:  1       Frequency:  Daily  ___________________    ==================>> VITAL SIGNS <<==================    Vital Signs Last 24 HrsT(C): 36.2 (01-12-23 @ 11:11)  T(F): 97.2 (01-12-23 @ 11:11), Max: 98 (01-11-23 @ 20:20)  HR: 70 (01-12-23 @ 11:11) (58 - 70)  BP: 94/59 (01-12-23 @ 11:11)  RR: 18 (01-12-23 @ 11:11) (18 - 18)  SpO2: 99% (01-12-23 @ 11:11) (99% - 100%)      CAPILLARY BLOOD GLUCOSE         ==================>> LAB AND IMAGING <<==================             WBC count:   17.85 <<== ,  15.88 <<== ,  16.41 <<==   Hemoglobin:   9.2 <<==,  8.7 <<==,  8.9 <<==  platelets:  205 <==, 232 <==, 260 <==, 316 <==    Creatinine:  0.70  <<==, 0.85  <<==, 0.97  <<==, 0.99  <<==, 1.04  <<==  Sodium:   142  <==, 142  <==, 141  <==, 138  <==, 139  <==       AST:          24 <== , 26 <==      ALT:        18  <== , 19  <==      AP:        280  <=, 339  <=     Bili:        0.2  <=, 0.4  <=    ____________________________    M I C R O B I O L O G Y :    Culture - Urine (collected 09 Jan 2023 21:36)  Source: Clean Catch Clean Catch (Midstream)  Final Report (10 Fabrizio 2023 23:13):    No growth        SARS-CoV-2: NotDetec (01-06-23 @ 20:40)  COVID-19 PCR: NotDetec (12-23-22 @ 23:54)     _________________________________________________________________________________________  ========>>  M E D I C A L   A T T E N D I N G    F O L L O W  U P  N O T E  <<=========  -----------------------------------------------------------------------------------------------------    - Patient seen and examined by me earlier today.   - In summary,  JEF HOUSE is a 57y year old woman admitted with chest discomfort   - Patient today overall doing ok, comfortable, eating fairly > encouraged as patient appears very weak , supplements ordered, at bedside..      patient states did not sleep much last night as was having frequent urination being on IVH therefore sleepy today     ==================>> REVIEW OF SYSTEM <<=================    GEN: no fever, no chills, chronic pain in back / flank stable / controlled   RESP: no SOB, no cough, no sputum  CVS: no chest pain now reported , no palpitations  GI: no abdominal pain, no nausea, chronic constipation  : no dysuria, no frequency, chronic hematuria, chronic vaginal bleeding   Neuro: no headache, no dizziness  Derm : no itching, no rash    ==================>> PHYSICAL EXAM <<=================    GEN: A&O X 3 , NAD , comfortable, pleasant, calm , cachectic, in bed   HEENT: NCAT, PERRL, MMM, hearing intact, temporal wasting   Neck: supple , no JVD appreciated  CVS: S1S2 , regular , No M/R/G appreciated  PULM: CTA B/L,  no W/R/R appreciated  ABD.: soft. non tender, non distended  Extrem: intact pulses , trace ankle edema , cachectic . + nephrostomy tube with urine draining   PSYCH : flat affect, not anxious                             ( Note written / Date of service 01-12-23 )    ==================>> MEDICATIONS <<====================    cefTRIAXone   IVPB 1000 milliGRAM(s) IV Intermittent every 24 hours  chlorhexidine 2% Cloths 1 Application(s) Topical <User Schedule>  enoxaparin Injectable 40 milliGRAM(s) SubCutaneous every 24 hours  lactated ringers. 1000 milliLiter(s) IV Continuous <Continuous>  mirtazapine 15 milliGRAM(s) Oral at bedtime  morphine   Solution 5 milliGRAM(s) Oral every 4 hours  naloxone Injectable 0.4 milliGRAM(s) IV Push once  polyethylene glycol 3350 17 Gram(s) Oral daily  senna 2 Tablet(s) Oral at bedtime    MEDICATIONS  (PRN):  acetaminophen     Tablet .. 325 milliGRAM(s) Oral every 6 hours PRN Mild Pain (1 - 3)  aluminum hydroxide/magnesium hydroxide/simethicone Suspension 30 milliLiter(s) Oral every 4 hours PRN Dyspepsia  melatonin 3 milliGRAM(s) Oral at bedtime PRN Insomnia  morphine   Solution 10 milliGRAM(s) Oral every 4 hours PRN Severe Pain (7 - 10)  morphine   Solution 5 milliGRAM(s) Oral every 4 hours PRN Moderate Pain (4 - 6)  ondansetron Injectable 4 milliGRAM(s) IV Push every 8 hours PRN Nausea and/or Vomiting    ___________  Active diet:  Diet, Soft and Bite Sized:   Nikita(7 Gm Arginine/7 Gm Glut/1.2 Gm HMB     Qty per Day:  1  Liquid Protein Supplement     Qty per Day:  1  Supplement Feeding Modality:  Oral  Ensure Plus High Protein Cans or Servings Per Day:  2       Frequency:  Two Times a day  Ensure Plant-Based Cans or Servings Per Day:  1       Frequency:  Daily  ___________________    ==================>> VITAL SIGNS <<==================    Vital Signs Last 24 HrsT(C): 36.2 (01-12-23 @ 11:11)  T(F): 97.2 (01-12-23 @ 11:11), Max: 98 (01-11-23 @ 20:20)  HR: 70 (01-12-23 @ 11:11) (58 - 70)  BP: 94/59 (01-12-23 @ 11:11)  RR: 18 (01-12-23 @ 11:11) (18 - 18)  SpO2: 99% (01-12-23 @ 11:11) (99% - 100%)       ==================>> LAB AND IMAGING <<==================       01-12    142  |  104  |  25<H>  ----------------------------<  118<H>  3.0<L>   |  27  |  0.72    Ca    11.1<H>      12 Jan 2023 17:59  Mg     1.7     01-12    TPro  5.4<L>  /  Alb  2.3<L>  /  TBili  0.1<L>  /  DBili  x   /  AST  28  /  ALT  23  /  AlkPhos  247<H>  01-12    WBC count:   17.85 <<== ,  15.88 <<== ,  16.41 <<==   Hemoglobin:   9.2 <<==,  8.7 <<==,  8.9 <<==  platelets:  205 <==, 232 <==, 260 <==, 316 <==    Creatinine:  0.70  <<==, 0.85  <<==, 0.97  <<==, 0.99  <<==, 1.04  <<==  Sodium:   142  <==, 142  <==, 141  <==, 138  <==, 139  <==       AST:          24 <== , 26 <==      ALT:        18  <== , 19  <==      AP:        280  <=, 339  <=     Bili:        0.2  <=, 0.4  <=    ____________________________    M I C R O B I O L O G Y :    Culture - Urine (collected 09 Jan 2023 21:36)  Source: Clean Catch Clean Catch (Midstream)  Final Report (10 Fabrizio 2023 23:13):    No growth    ___________________________________________________________________________________  ===============>>  A S S E S S M E N T   A N D   P L A N <<===============  ------------------------------------------------------------------------------------------    · Assessment	  56 y/o woman with PMH Asthma, Anemia, Bladder cancer with metastases to the lungs, direct invasion to vagina, on chronic home O2 for comfort, Bradycardia / AF post PPM, Dyslipidemia, Hydronephrosis, R-nephrostomy tube, Liver cyst, Parathyroid tumor, Chronic pain on morphine sulfate at home ( not on hospice) presenting to the ED via EMS from oncologist office for worsening lethargy and intermittent central chest pain. Found to have profound hypercalcemia.     Problem/Plan - 1:  ·  Problem: Hypercalcemia of malignancy  -c/w IV hydration as calcium downtrending > reduce rate overnight   -s/p calcitonin in ED >> repeat dose per Onc discussion and Bisphosphonate done   -heme/onc f/u  - trend CMP  supplement hypokalemia / hypomagnesemia as needed  keep K~4, Mag ~2    Problem/Plan - 2:  ·  Problem: Leukocytosis.   ·  Plan: -chronic  however with reported productive cough with pleuritic chest pain and elevated PCT     under treatment empirically for possible PNA ( patient declined to have an xray )!   -trend WBC   -c/w ceftriaxone 5 doses and DC     Problem/Plan - 3:  ·  Problem: Bladder cancer.   ·  Plan: -no chemo since 05/2022  -heme/onc f/u     plan as OP was to have another biopsy   onc appreciated  plan for liver biopsy when patient stable / better.. ?    Problem/Plan - 4:  ·  Problem: Prophylactic measure.   ·  Plan: -Diet: Regular  -DVT ppx: Lovenox qd.      --------------------------------------------  Case discussed with patient, NP,..   Education given on findings and plan of care  ___________________________  H. NIEVES Ochoa.  Pager: [phone]

## 2023-01-12 NOTE — PROGRESS NOTE ADULT - SUBJECTIVE AND OBJECTIVE BOX
CARDIOLOGY     PROGRESS  NOTE   ________________________________________________    CHIEF COMPLAINT:Patient is a 57y old  Female who presents with a chief complaint of chest pain (11 Jan 2023 14:48)  no complain, doing better  	  REVIEW OF SYSTEMS:  CONSTITUTIONAL: No fever, weight loss, or fatigue  EYES: No eye pain, visual disturbances, or discharge  ENT:  No difficulty hearing, tinnitus, vertigo; No sinus or throat pain  NECK: No pain or stiffness  RESPIRATORY: No cough, wheezing, chills or hemoptysis; No Shortness of Breath  CARDIOVASCULAR: No chest pain, palpitations, passing out, dizziness, or leg swelling  GASTROINTESTINAL: No abdominal or epigastric pain. No nausea, vomiting, or hematemesis; No diarrhea or constipation. No melena or hematochezia.  GENITOURINARY: No dysuria, frequency, hematuria, or incontinence  NEUROLOGICAL: No headaches, memory loss, loss of strength, numbness, or tremors  SKIN: No itching, burning, rashes, or lesions   LYMPH Nodes: No enlarged glands  ENDOCRINE: No heat or cold intolerance; No hair loss  MUSCULOSKELETAL: No joint pain or swelling; No muscle, back, or extremity pain  PSYCHIATRIC: No depression, anxiety, mood swings, or difficulty sleeping  HEME/LYMPH: No easy bruising, or bleeding gums  ALLERGY AND IMMUNOLOGIC: No hives or eczema	    [ ] All others negative	  [ ] Unable to obtain    PHYSICAL EXAM:  T(C): 36.2 (01-12-23 @ 05:20), Max: 36.7 (01-11-23 @ 20:20)  HR: 65 (01-12-23 @ 05:20) (58 - 80)  BP: 113/66 (01-12-23 @ 05:20) (96/60 - 113/66)  RR: 18 (01-12-23 @ 05:20) (18 - 18)  SpO2: 100% (01-12-23 @ 05:20) (99% - 100%)  Wt(kg): --  I&O's Summary    11 Jan 2023 07:01  -  12 Jan 2023 07:00  --------------------------------------------------------  IN: 3430 mL / OUT: 0 mL / NET: 3430 mL        Appearance: Normal	  HEENT:   Normal oral mucosa, PERRL, EOMI	  Lymphatic: No lymphadenopathy  Cardiovascular: Normal S1 S2, No JVD, + murmurs, No edema  Respiratory: Lungs clear to auscultation	  Psychiatry: A & O x 3, Mood & affect appropriate  Gastrointestinal:  Soft, Non-tender, + BS	  Skin: No rashes, No ecchymoses, No cyanosis	  Neurologic: Non-focal  Extremities: Normal range of motion, No clubbing, cyanosis or edema  Vascular: Peripheral pulses palpable 2+ bilaterally    MEDICATIONS  (STANDING):  cefTRIAXone   IVPB 1000 milliGRAM(s) IV Intermittent every 24 hours  chlorhexidine 2% Cloths 1 Application(s) Topical <User Schedule>  enoxaparin Injectable 40 milliGRAM(s) SubCutaneous every 24 hours  lactated ringers. 1000 milliLiter(s) (120 mL/Hr) IV Continuous <Continuous>  mirtazapine 15 milliGRAM(s) Oral at bedtime  naloxone Injectable 0.4 milliGRAM(s) IV Push once  polyethylene glycol 3350 17 Gram(s) Oral daily  senna 2 Tablet(s) Oral at bedtime      TELEMETRY: 	    ECG:  	  RADIOLOGY:  OTHER: 	  	  LABS:	 	    CARDIAC MARKERS:        proBNP: Serum Pro-Brain Natriuretic Peptide: 510 pg/mL (01-06 @ 19:43)  Serum Pro-Brain Natriuretic Peptide: 202 pg/mL (12-24 @ 05:41)    Lipid Profile:   HgA1c:   TSH:         Assessment and plan  ---------------------------  Bradycardia, Hypertension, Dyslipidemia, Hydronephrosis, R-nephrostomy tube, Liver cyst, Parathyroid tumor, Chronic pain on morphine 2.5 (last took this morning) and 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 Patient reported the pain is sharp well localized and occasionally pleuritic. Has chronic cough with bloody or brown sputum. Reports feeling dehydrated. Has not had chemo since 5/2022. Patient denied abdominal pain, N/V/D, fever, urinary symptoms, rash  pt with metastatic bladder ca / onc eval  s/p ppm , sss  chest pain doubt cardiac ?metastatic ca bony mets  dvt prophylaxis  severe protein deficiency, nutrition eval   hydration  psych eval start Remeron 15 mg po qhs  pain management  PE has been ruled out several admissions  dvt prophylaxis  nutrition consult severe protein deficiency appreciated  check labs, hypercalcemia, increase fluid,  endocrine eval noted  ?abx check urin  oncology eval appreciated, awaiting ct chest abdomen and pelvis  physical therapy  replete K, keep K>4  doubt pt is a candidate for chemo being so malnurish  dc tele  ?hospice care

## 2023-01-13 NOTE — PROGRESS NOTE ADULT - PROBLEM SELECTOR PLAN 1
Current RX:  add Morphine sol 5mg po q4 ATC ( pt not using)  Morphine oral solution 5 mg q 4 hours prn moderate pain:  1 doses / 24 hours  Morphine oral solution 10 mg q 4 hours prn severe pain:  none used  Patient complains of persistent pain, incongruent with the number of PRN's utilized  Educated patient on importance of taking the pain meds when pain is acute and to use the higher dose that is available.  Patient remains reluctant to start long acting meds, although at this junction, Fentanyl likely not able to absorb due to severe cachexia and refusing to take pills

## 2023-01-13 NOTE — PROGRESS NOTE ADULT - SUBJECTIVE AND OBJECTIVE BOX
CARDIOLOGY     PROGRESS  NOTE   ________________________________________________    CHIEF COMPLAINT:Patient is a 57y old  Female who presents with a chief complaint of chest pain (12 Jan 2023 14:21)  pain is better controlled  	  REVIEW OF SYSTEMS:  CONSTITUTIONAL: No fever, weight loss, or fatigue  EYES: No eye pain, visual disturbances, or discharge  ENT:  No difficulty hearing, tinnitus, vertigo; No sinus or throat pain  NECK: No pain or stiffness  RESPIRATORY: No cough, wheezing, chills or hemoptysis; No Shortness of Breath  CARDIOVASCULAR: No chest pain, palpitations, passing out, dizziness, or leg swelling  GASTROINTESTINAL: No abdominal or epigastric pain. No nausea, vomiting, or hematemesis; No diarrhea or constipation. No melena or hematochezia.  GENITOURINARY: No dysuria, frequency, hematuria, or incontinence  NEUROLOGICAL: No headaches, memory loss, loss of strength, numbness, or tremors  SKIN: No itching, burning, rashes, or lesions   LYMPH Nodes: No enlarged glands  ENDOCRINE: No heat or cold intolerance; No hair loss  MUSCULOSKELETAL: No joint pain or swelling; No muscle, back, or extremity pain  PSYCHIATRIC: No depression, anxiety, mood swings, or difficulty sleeping  HEME/LYMPH: No easy bruising, or bleeding gums  ALLERGY AND IMMUNOLOGIC: No hives or eczema	    [x ] All others negative	  [ ] Unable to obtain    PHYSICAL EXAM:  T(C): 36.8 (01-13-23 @ 05:59), Max: 36.8 (01-13-23 @ 05:59)  HR: 64 (01-13-23 @ 05:59) (64 - 81)  BP: 97/64 (01-13-23 @ 05:59) (92/63 - 97/64)  RR: 18 (01-13-23 @ 05:59) (18 - 18)  SpO2: 100% (01-13-23 @ 05:59) (97% - 100%)  Wt(kg): --  I&O's Summary    12 Jan 2023 07:01  -  13 Jan 2023 07:00  --------------------------------------------------------  IN: 1070 mL / OUT: 150 mL / NET: 920 mL        Appearance: Normal	  HEENT:   Normal oral mucosa, PERRL, EOMI	  Lymphatic: No lymphadenopathy  Cardiovascular: Normal S1 S2, No JVD, + murmurs, No edema  Respiratory: rhonchi  Psychiatry: A & O x 3, Mood & affect appropriate  Gastrointestinal:  Soft, Non-tender, + BS	  Skin: No rashes, No ecchymoses, No cyanosis	  Neurologic: Non-focal  Extremities: Normal range of motion, No clubbing, cyanosis or edema  Vascular: Peripheral pulses palpable 2+ bilaterally    MEDICATIONS  (STANDING):  chlorhexidine 2% Cloths 1 Application(s) Topical <User Schedule>  enoxaparin Injectable 40 milliGRAM(s) SubCutaneous every 24 hours  lactated ringers. 1000 milliLiter(s) (80 mL/Hr) IV Continuous <Continuous>  mirtazapine 15 milliGRAM(s) Oral at bedtime  morphine   Solution 5 milliGRAM(s) Oral every 4 hours  naloxone Injectable 0.4 milliGRAM(s) IV Push once  polyethylene glycol 3350 17 Gram(s) Oral daily  senna 2 Tablet(s) Oral at bedtime      TELEMETRY: 	    ECG:  	  RADIOLOGY:  OTHER: 	  	  LABS:	 	    CARDIAC MARKERS:            01-12    142  |  104  |  25<H>  ----------------------------<  118<H>  3.0<L>   |  27  |  0.72    Ca    11.1<H>      12 Jan 2023 17:59  Mg     1.7     01-12    TPro  5.4<L>  /  Alb  2.3<L>  /  TBili  0.1<L>  /  DBili  x   /  AST  28  /  ALT  23  /  AlkPhos  247<H>  01-12    proBNP: Serum Pro-Brain Natriuretic Peptide: 510 pg/mL (01-06 @ 19:43)  Serum Pro-Brain Natriuretic Peptide: 202 pg/mL (12-24 @ 05:41)    Lipid Profile:   HgA1c:   TSH:         Assessment and plan  ---------------------------  Bradycardia, Hypertension, Dyslipidemia, Hydronephrosis, R-nephrostomy tube, Liver cyst, Parathyroid tumor, Chronic pain on morphine 2.5 (last took this morning) and 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 Patient reported the pain is sharp well localized and occasionally pleuritic. Has chronic cough with bloody or brown sputum. Reports feeling dehydrated. Has not had chemo since 5/2022. Patient denied abdominal pain, N/V/D, fever, urinary symptoms, rash  pt with metastatic bladder ca / onc eval  s/p ppm , sss  chest pain doubt cardiac ?metastatic ca bony mets  dvt prophylaxis  severe protein deficiency, nutrition eval   hydration  psych eval start Remeron 15 mg po qhs  pain management  PE has been ruled out several admissions  dvt prophylaxis  nutrition consult severe protein deficiency appreciated  check labs, hypercalcemia, increase fluid,  endocrine eval noted  ?abx check urin  oncology eval appreciated, awaiting ct chest abdomen and pelvis  physical therapy  replete K, keep K>4  doubt pt is a candidate for chemo being so malnurish  replete lytes  ppm was recently checked

## 2023-01-13 NOTE — PROGRESS NOTE ADULT - SUBJECTIVE AND OBJECTIVE BOX
SUBJECTIVE AND OBJECTIVE:  Sitting up in bed, awake alert and participatory in her care.  Frail appearing , in no acute distress  Indication for Geriatrics and Palliative Care Services/INTERVAL HPI:  pain /symptom management     OVERNIGHT EVENTS: no acute event overnight. Pt required 1 doses    DNR on chart:  Allergies    No Known Allergies    Intolerances    MEDICATIONS  (STANDING):  cefTRIAXone   IVPB 1000 milliGRAM(s) IV Intermittent every 24 hours  chlorhexidine 2% Cloths 1 Application(s) Topical <User Schedule>  enoxaparin Injectable 40 milliGRAM(s) SubCutaneous every 24 hours  lactated ringers. 1000 milliLiter(s) (120 mL/Hr) IV Continuous <Continuous>  mirtazapine 15 milliGRAM(s) Oral at bedtime  morphine   Solution 5 milliGRAM(s) Oral every 4 hours  naloxone Injectable 0.4 milliGRAM(s) IV Push once  polyethylene glycol 3350 17 Gram(s) Oral daily  senna 2 Tablet(s) Oral at bedtime    MEDICATIONS  (PRN):  acetaminophen     Tablet .. 325 milliGRAM(s) Oral every 6 hours PRN Mild Pain (1 - 3)  aluminum hydroxide/magnesium hydroxide/simethicone Suspension 30 milliLiter(s) Oral every 4 hours PRN Dyspepsia  melatonin 3 milliGRAM(s) Oral at bedtime PRN Insomnia  morphine   Solution 10 milliGRAM(s) Oral every 4 hours PRN Severe Pain (7 - 10)  morphine   Solution 5 milliGRAM(s) Oral every 4 hours PRN Moderate Pain (4 - 6)  ondansetron Injectable 4 milliGRAM(s) IV Push every 8 hours PRN Nausea and/or Vomiting        ITEMS UNCHECKED ARE NOT PRESENT    PRESENT SYMPTOMS: [ ]Unable to self-report - see [ ] CPOT [ ] PAINADS [ ] RDOS  Source if other than patient:  [ ]Family   [ ]Team     Pain:  [x ]yes [ ]no  QOL impact -   Location -      lower abdomen             Aggravating factors -  movement / positional   Quality - dull achy   Radiation - none  Timing-  Severity (0-10 scale):  7/10 at time of assessment   Minimal acceptable level (0-10 scale):     CPOT:    https://www.sccm.org/getattachment/ohs95d27-1a2e-1n4p-9y7e-4221c0732z1z/Critical-Care-Pain-Observation-Tool-(CPOT)    PAIN AD Score:	  [email or web address] (Ctrl + left click to view)    Dyspnea:                           [ ]Mild [ ]Moderate [ ]Severe    RDOS:  0 to 2  minimal or no respiratory distress   3  mild distress  4 to 6 moderate distress  >7 severe distress  https://homecareinformation.net/handouts/hen/Respiratory_Distress_Observation_Scale.pdf (Ctrl +  left click to view)     Anxiety:                             [ ]Mild [ x]Moderate [ ]Severe  Fatigue:                             [ ]Mild [x ]Moderate [ ]Severe  Nausea:                            [ ]Mild [ ]Moderate [ ]Severe  Loss of appetite:               [ ]Mild [x ]Moderate [ x]Severe  Constipation:                    [ ]Mild [ ]Moderate [ ]Severe    PCSSQ[Palliative Care Spiritual Screening Question]   Severity (0-10):  3  Score of 4 or > indicate consideration of Chaplaincy referral.  Chaplaincy Referral: [ ] yes [ ] refused [ ] following  deferred xx    Caregiver [place]? : [ ] yes [ ] no  Social work referral [ ] Patient & Family Centered Care Referral [ ]   UNKNOWN     Anticipatory Grief present?:  [ ] yes [ ] no  Social work referral [ ] Patient & Family Centered Care Referral [ ]      Other Symptoms:  [x ]All other review of systems negative     Palliative Performance Status Version 2:        30 %      http://npcrc.org/files/news/palliative_performance_scale_ppsv2.pdf  PHYSICAL EXAM:    Vital Signs Last 24 Hrs  T(C): 36.2 (01-12-23 @ 05:20), Max: 36.7 (01-11-23 @ 20:20)  T(F): 97.2 (01-12-23 @ 05:20), Max: 98 (01-11-23 @ 20:20)  HR: 65 (01-12-23 @ 05:20) (58 - 80)  BP: 113/66 (01-12-23 @ 05:20) (96/60 - 113/66)  BP(mean): --  RR: 18 (01-12-23 @ 05:20) (18 - 18)  SpO2: 100% (01-12-23 @ 05:20) (99% - 100%)         GENERAL: [ ]Cachexia    [ x]Alert  [ x]Oriented x 4  [ ]Lethargic  [ ]Unarousable  [x ]Verbal  [ ]Non-Verbal  Behavioral:   [ x]Anxiety  [ ]Delirium [ ]Agitation [ ]Other  HEENT:  [ ]Normal   [x ]Dry mouth   [ ]ET Tube/Trach  [ ]Oral lesions  PULMONARY:   [ x]Clear [ ]Tachypnea  [ ]Audible excessive secretions   [ ]Rhonchi        [ ]Right [ ]Left [ ]Bilateral  [ ]Crackles        [ ]Right [ ]Left [ ]Bilateral  [ ]Wheezing     [ ]Right [ ]Left [ ]Bilateral  [ ]Diminished BS [ ] Right [ ]Left [ ]Bilateral  CARDIOVASCULAR:    [x ]Regular [ ]Irregular [ ]Tachy  [ ]Bradley [ ]Murmur [ ]Other  GASTROINTESTINAL:  [ x]Softly distended   [ ]Distended   [x ]+BS  [ ]Non tender [ ]Tender  [ ]Other [ ]PEG [ ]OGT/ NGT   Last BM:   GENITOURINARY:  [x ]Normal [ ]Incontinent   [ ]Oliguria/Anuria   [ ]Lloyd  MUSCULOSKELETAL:   [ ]Normal   [x ]Weakness  [ ]Bed/Wheelchair bound [ ]Edema  NEUROLOGIC:   [ x]No focal deficits  [ ] Cognitive impairment  [ ] Dysphagia [ ]Dysarthria [ ] Paresis [ ]Other   SKIN:   [ ]Normal  [ ]Rash  [ ]Other  [ x]Pressure ulcer(s) Stage 2 sacrum [ ]y [ ]n present on admission    CRITICAL CARE:  [ ]Shock Present  [ ]Septic [ ]Cardiogenic [ ]Neurologic [ ]Hypovolemic  [ ]Vasopressors [ ]Inotropes  [ ]Respiratory failure present [ ]Mechanical Ventilation [ ]Non-invasive ventilatory support [ ]High-Flow   [ ]Acute  [ ]Chronic [ ]Hypoxic  [ ]Hypercarbic [ ]Other  [ ]Other organ failure     LABS:          RADIOLOGY & ADDITIONAL STUDIES:        Protein Calorie Malnutrition Present: [ ]mild [ ]moderate [ ]severe [ ]underweight [ ]morbid obesity  Consult Type: Non Face-to-Face.     Time-Base Billing for Non-Face-to-Face consult (Minutes) 5-10.    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 Recently d/c from  on Dec 21th for SOB. Back in  [facility] on 24th for blood transfusion now presenting to the ED via EMS from outpt office for worsening lethargy and intermittent central chest pain. IR consulted for liver biopsy.     Allergies: NKDA  Medications (Abx/Cardiac/Anticoagulation/Blood Products)  azithromycin  IVPB: 255 mL/Hr IV Intermittent (01-08 @ 21:48)  cefTRIAXone   IVPB: 100 mL/Hr IV Intermittent (01-08 @ 04:34)  cefTRIAXone   IVPB: 100 mL/Hr IV Intermittent (01-09 @ 05:31)  enoxaparin Injectable: 40 milliGRAM(s) SubCutaneous (01-09 @ 13:22)    Data:  T(C): 36.3  HR: 82  BP: 106/65  RR: 18  SpO2: 100%    -WBC 15.88 / HgB 8.7 / Hct 28.9 / Plt 232  -Na 142 / Cl 105 / BUN 34 / Glucose 76  -K 3.2 / CO2 20 / Cr 0.85  -ALT -- / Alk Phos -- / T.Bili --  -INR 1.27 / PTT 30.7    Radiology: pending CT    Assessment/Plan: 57F PMH Asthma, Anemia, Bladder cancer with  metastases to the lungs(patient of Dr Kan) on chronic home O2, Bradycardia, Hypertension, Dyslipidemia, Hydronephrosis, R-nephrostomy tube, Liver cyst, Parathyroid tumor, Chronic pain on morphine 2.5 (last took this morning) and 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 Recently d/c from  on Dec 21th for SOB. Back in  [facility] on 24th for blood transfusion now presenting to the ED via EMS from outpt office for worsening lethargy and intermittent central chest pain. IR consulted for liver biopsy.     **consult in progress**  < from: CT Abdomen and Pelvis No Cont (10.17.22 @ 17:54) >    ACC: [id number] EXAM:  CT CHEST                        ACC: [id number] EXAM:  CT ABDOMEN AND PELVIS                          PROCEDURE DATE:  10/17/2022          INTERPRETATION:  CLINICAL INFORMATION: Left hip and left back pain.   Concern for metastasis or stones.    COMPARISON: CT chest, abdomen and pelvis from 9/20/2022.    CONTRAST/COMPLICATIONS:  IV Contrast: None.  Oral Contrast: None.  Complications: None reported.    PROCEDURE:  CT of the Chest, Abdomen and Pelvis was performed.  Sagittal and coronal reformats were performed.    FINDINGS:  CHEST:  LUNGS AND LARGE AIRWAYS: Redemonstration of innumerable bilateral   pulmonary nodules consistent with metastases. A few of the largest   pulmonary nodules are mildly increased in size. For example, left lower   lobe nodule measures 3.3 x 2.3 cm (2:116), previously 2.8 x 2.0 cm on   9/20/2022 . A right lower lobe nodule measures 2.4 x 2.2 cm (2:103),   previously 2.2 x 2.1 cm.  PLEURA: No pleural effusion.  VESSELS: Within normal limits.  HEART: Heart size is normal. Small pericardial effusion.  MEDIASTINUM AND FLORENTIN: No lymphadenopathy.  CHEST WALL AND LOWER NECK: Left chest wall pacemaker with leads in the   right atrium and ventricle.    ABDOMEN AND PELVIS:  LIVER: Interval increase in size of hepatic metastases. For reference,   segment 7 mass measures 4.0 x 2.3 cm (2:148), previously 3.3 x 2.2 cm.  BILE DUCTS: Normal caliber.  GALLBLADDER: Within normal limits.  SPLEEN: Within normal limits.  PANCREAS: Within normal limits.  ADRENALS: Within normal limits.  KIDNEYS/URETERS: Right percutaneous nephrostomy catheter; no right   hydronephrosis. 0.4 cm non obstructing stone in the right lower pole. Left   kidney is enlarged with severe hydroureteronephrosis and markedly dilated   calyces, unchanged. There are nonobstructive stones of the left kidney.    BLADDER: Large bladder mass is without significant change.  REPRODUCTIVE ORGANS: Uterus and adnexa within normal limits.    BOWEL: No bowel obstruction. Appendix is normal.  PERITONEUM: No ascites.  VESSELS: Within normal limits.  RETROPERITONEUM/LYMPH NODES: Retroperitoneal and pelvic lymphadenopathy   is unchanged.  ABDOMINAL WALL: Within normal limits.  BONES: No evidence of osseous metastatic disease.    IMPRESSION:  1. Large bladder mass is without significant change. Severe chronic   hydronephrosis of the left kidney is unchanged. Right nephrostomy tube in   place; no right hydronephrosis.  2.  Numerous bilateral pulmonary metastases; mild interval increase in   size of a few of the largest pulmonary metastases compared to 9/20/2022.  3.  Progression of liver metastases.  4.  No evidence of osseous metastatic disease.        --- End of Report ---    < end of copied text >  [email or web address].[email or web address]/[email or web address]_Clinical%20Characteristics%20to%[email or web address]-[email or web address]%20et%[email or web address].pdf    Height (cm): 162.6 (01-07-23 @ 23:00), 162.6 (12-23-22 @ 22:59), 162.6 (12-01-22 @ 15:25)  Weight (kg): 36 (01-07-23 @ 23:00), 33.6 (12-23-22 @ 22:59), 35.2986 (12-23-22 @ 10:00)  BMI (kg/m2): 13.6 (01-07-23 @ 23:00), 12.7 (12-23-22 @ 22:59), 13.4 (12-23-22 @ 10:00)    [ ]PPSV2 < or = 30%  [ ]significant weight loss [x ]poor nutritional intake [ ]anasarca[ ]Artificial Nutrition    Other REFERRALS:  [ ]Hospice  [ ]Child Life  [ ]Social Work  [ x]Case management [ ]Holistic Therapy

## 2023-01-13 NOTE — PROGRESS NOTE ADULT - ASSESSMENT
( Note written / Date of service 01-13-23 )    ==================>> MEDICATIONS <<====================    chlorhexidine 2% Cloths 1 Application(s) Topical <User Schedule>  enoxaparin Injectable 40 milliGRAM(s) SubCutaneous every 24 hours  lactated ringers. 1000 milliLiter(s) IV Continuous <Continuous>  mirtazapine 15 milliGRAM(s) Oral at bedtime  morphine   Solution 5 milliGRAM(s) Oral every 4 hours  naloxone Injectable 0.4 milliGRAM(s) IV Push once  polyethylene glycol 3350 17 Gram(s) Oral daily  senna 2 Tablet(s) Oral at bedtime    MEDICATIONS  (PRN):  acetaminophen     Tablet .. 325 milliGRAM(s) Oral every 6 hours PRN Mild Pain (1 - 3)  aluminum hydroxide/magnesium hydroxide/simethicone Suspension 30 milliLiter(s) Oral every 4 hours PRN Dyspepsia  melatonin 3 milliGRAM(s) Oral at bedtime PRN Insomnia  morphine   Solution 10 milliGRAM(s) Oral every 4 hours PRN Severe Pain (7 - 10)  morphine   Solution 5 milliGRAM(s) Oral every 4 hours PRN Moderate Pain (4 - 6)  ondansetron Injectable 4 milliGRAM(s) IV Push every 8 hours PRN Nausea and/or Vomiting    ___________  Active diet:  Diet, Soft and Bite Sized:   Nikita(7 Gm Arginine/7 Gm Glut/1.2 Gm HMB     Qty per Day:  1  Liquid Protein Supplement     Qty per Day:  1  Supplement Feeding Modality:  Oral  Ensure Plus High Protein Cans or Servings Per Day:  2       Frequency:  Two Times a day  Ensure Plant-Based Cans or Servings Per Day:  1       Frequency:  Daily  ___________________    ==================>> VITAL SIGNS <<==================    Vital Signs Last 24 HrsT(C): 36.2 (01-13-23 @ 12:05)  T(F): 97.1 (01-13-23 @ 12:05), Max: 98.2 (01-13-23 @ 05:59)  HR: 90 (01-13-23 @ 12:11) (64 - 90)  BP: 95/65 (01-13-23 @ 12:11)  RR: 18 (01-13-23 @ 12:05) (18 - 18)  SpO2: 99% (01-13-23 @ 12:05) (97% - 100%)      CAPILLARY BLOOD GLUCOSE      POCT Blood Glucose.: 123 mg/dL (12 Jan 2023 22:10)     ==================>> LAB AND IMAGING <<==================       01-13    143  |  105  |  25<H>  ----------------------------<  88  3.0<L>   |  27  |  0.70    Ca    10.8<H>      13 Jan 2023 09:25  Mg     1.7     01-12    TPro  5.4<L>  /  Alb  2.3<L>  /  TBili  0.1<L>  /  DBili  x   /  AST  31  /  ALT  23  /  AlkPhos  258<H>  01-13    WBC count:   17.85 <<== ,  15.88 <<== ,  16.41 <<==   Hemoglobin:   9.2 <<==,  8.7 <<==,  8.9 <<==  platelets:  205 <==, 232 <==, 260 <==    Creatinine:  0.70  <<==, 0.72  <<==, 0.70  <<==, 0.85  <<==  Sodium:   143  <==, 142  <==, 142  <==, 142  <==       AST:          31 <== , 28 <== , 24 <== , 26 <==      ALT:        23  <== , 23  <== , 18  <== , 19  <==      AP:        258  <=, 247  <=, 280  <=, 339  <=     Bili:        0.1  <=, 0.1  <=, 0.2  <=, 0.4  <=    ____________________________    M I C R O B I O L O G Y :    Culture - Urine (collected 09 Jan 2023 21:36)  Source: Clean Catch Clean Catch (Midstream)  Final Report (10 Fabrizio 2023 23:13):    No growth        SARS-CoV-2: NotTonya (01-06-23 @ 20:40)     _________________________________________________________________________________________  ========>>  M E D I C A L   A T T E N D I N G    F O L L O W  U P  N O T E  <<=========  -----------------------------------------------------------------------------------------------------    - Patient seen and examined by me earlier today.   - In summary,  JEF HOUSE is a 57y year old woman admitted with chest discomfort   - Patient today overall doing ok, comfortable, eating fairly > encouraged as patient appears very weak , supplements ordered, at bedside..       patient states was sleeping  lot because of the morphine and is not taking some of the doses but c/o a lot of pain... also is coerced about her BP being low.. > encouraged o eat better and take pain meds to be comfortable     ==================>> REVIEW OF SYSTEM <<=================    GEN: no fever, no chills, chronic pain in back / flank as above   RESP: no SOB, no cough, no sputum  CVS: no chest pain now reported , no palpitations  GI: no abdominal pain, no nausea  : no dysuria, no frequency, chronic hematuria, chronic vaginal bleeding   Neuro: no headache, no dizziness  Derm : no itching, no rash    ==================>> PHYSICAL EXAM <<=================    GEN: A&O X 3 , NAD , comfortable, pleasant, calm , cachectic, in bed   HEENT: NCAT, PERRL, MMM, hearing intact, temporal wasting   Neck: supple , no JVD appreciated  CVS: S1S2 , regular , No M/R/G appreciated  PULM: CTA B/L,  no W/R/R appreciated  ABD.: soft. non tender, non distended  Extrem: intact pulses , trace ankle edema , cachectic . + nephrostomy tube with little urine draining > RN to flush   PSYCH : flat affect, not anxious                             ( Note written / Date of service 01-13-23 )    ==================>> MEDICATIONS <<====================    chlorhexidine 2% Cloths 1 Application(s) Topical <User Schedule>  enoxaparin Injectable 40 milliGRAM(s) SubCutaneous every 24 hours  lactated ringers. 1000 milliLiter(s) IV Continuous <Continuous>  mirtazapine 15 milliGRAM(s) Oral at bedtime  morphine   Solution 5 milliGRAM(s) Oral every 4 hours  naloxone Injectable 0.4 milliGRAM(s) IV Push once  polyethylene glycol 3350 17 Gram(s) Oral daily  senna 2 Tablet(s) Oral at bedtime    MEDICATIONS  (PRN):  acetaminophen     Tablet .. 325 milliGRAM(s) Oral every 6 hours PRN Mild Pain (1 - 3)  aluminum hydroxide/magnesium hydroxide/simethicone Suspension 30 milliLiter(s) Oral every 4 hours PRN Dyspepsia  melatonin 3 milliGRAM(s) Oral at bedtime PRN Insomnia  morphine   Solution 10 milliGRAM(s) Oral every 4 hours PRN Severe Pain (7 - 10)  morphine   Solution 5 milliGRAM(s) Oral every 4 hours PRN Moderate Pain (4 - 6)  ondansetron Injectable 4 milliGRAM(s) IV Push every 8 hours PRN Nausea and/or Vomiting    ___________  Active diet:  Diet, Soft and Bite Sized:   Nikita(7 Gm Arginine/7 Gm Glut/1.2 Gm HMB     Qty per Day:  1  Liquid Protein Supplement     Qty per Day:  1  Supplement Feeding Modality:  Oral  Ensure Plus High Protein Cans or Servings Per Day:  2       Frequency:  Two Times a day  Ensure Plant-Based Cans or Servings Per Day:  1       Frequency:  Daily  ___________________    ==================>> VITAL SIGNS <<==================    Vital Signs Last 24 HrsT(C): 36.2 (01-13-23 @ 12:05)  T(F): 97.1 (01-13-23 @ 12:05), Max: 98.2 (01-13-23 @ 05:59)  HR: 90 (01-13-23 @ 12:11) (64 - 90)  BP: 95/65 (01-13-23 @ 12:11)  RR: 18 (01-13-23 @ 12:05) (18 - 18)  SpO2: 99% (01-13-23 @ 12:05) (97% - 100%)      POCT Blood Glucose.: 123 mg/dL (12 Jan 2023 22:10)     ==================>> LAB AND IMAGING <<==================       01-13    143  |  105  |  25<H>  ----------------------------<  88  3.0<L>   |  27  |  0.70    Ca    10.8<H>      13 Jan 2023 09:25  Mg     1.7     01-12    TPro  5.4<L>  /  Alb  2.3<L>  /  TBili  0.1<L>  /  DBili  x   /  AST  31  /  ALT  23  /  AlkPhos  258<H>  01-13    WBC count:   17.85 <<== ,  15.88 <<== ,  16.41 <<==   Hemoglobin:   9.2 <<==,  8.7 <<==,  8.9 <<==  platelets:  205 <==, 232 <==, 260 <==    Creatinine:  0.70  <<==, 0.72  <<==, 0.70  <<==, 0.85  <<==  Sodium:   143  <==, 142  <==, 142  <==, 142  <==       AST:          31 <== , 28 <== , 24 <== , 26 <==      ALT:        23  <== , 23  <== , 18  <== , 19  <==      AP:        258  <=, 247  <=, 280  <=, 339  <=     Bili:        0.1  <=, 0.1  <=, 0.2  <=, 0.4  <=    ___________________________________________________________________________________  ===============>>  A S S E S S M E N T   A N D   P L A N <<===============  ------------------------------------------------------------------------------------------    · Assessment	  58 y/o woman with PMH Asthma, Anemia, Bladder cancer with metastases to the lungs, direct invasion to vagina, on chronic home O2 for comfort, Bradycardia / AF post PPM, Dyslipidemia, Hydronephrosis, R-nephrostomy tube, Liver cyst, Parathyroid tumor, Chronic pain on morphine sulfate at home ( not on hospice) presenting to the ED via EMS from oncologist office for worsening lethargy and intermittent central chest pain. Found to have profound hypercalcemia.     Problem/Plan - 1:  ·  Problem: Hypercalcemia of malignancy  -c/w IV hydration as calcium downtrending / improved   -s/p calcitonin and Bisphosphonate dose  -heme/onc f/u  - trend CMP  supplement hypokalemia / hypomagnesemia as needed  keep K~4, Mag ~2    Problem/Plan - 2:  ·  Problem: Leukocytosis.   ·  Plan: -chronic  however with reported productive cough with pleuritic chest pain and elevated PCT     under treatment empirically for possible PNA ( patient declined to have an xray )!   -trend WBC   -s/p ceftriaxone 5 doses and DC     Problem/Plan - 3:  ·  Problem: Bladder cancer.   ·  Plan: -no chemo since 05/2022  -heme/onc f/u   onc followup   plan for liver biopsy when patient stable / better.. ?    Problem/Plan - 4:  ·  Problem: Prophylactic measure.   ·  Plan: -Diet: Regular  -DVT ppx: Lovenox qd.    --------------------------------------------  Case discussed with patient, RN  Education given on findings and plan of care  ___________________________  HLeslee Ochoa D.O.  Pager: [phone]

## 2023-01-14 NOTE — CHART NOTE - NSCHARTNOTEFT_GEN_A_CORE
Chart reviewed  ATC dosing not administered at times  PT awake and alert, working with PCA  D/w nurse Shira who reports that the patient is refusing ATC doses when SBPs approx 90      In the event of newly developing, evolving, or worsening symptoms, please contact the Palliative Medicine team via pager (if the patient is at [facility] #[phone] or if the patient is at [facility] #[phone]) The Geriatric and Palliative Medicine service has coverage 24 hours a day/ 7 days a week to provide medical recommendations regarding symptom management needs via telephone

## 2023-01-14 NOTE — PROGRESS NOTE ADULT - SUBJECTIVE AND OBJECTIVE BOX
CARDIOLOGY     PROGRESS  NOTE   ________________________________________________    CHIEF COMPLAINT:Patient is a 57y old  Female who presents with a chief complaint of chest pain (13 Jan 2023 15:06)  pain all over  	  REVIEW OF SYSTEMS:  CONSTITUTIONAL: No fever, weight loss, or fatigue  EYES: No eye pain, visual disturbances, or discharge  ENT:  No difficulty hearing, tinnitus, vertigo; No sinus or throat pain  NECK: No pain or stiffness  RESPIRATORY: No cough, wheezing, chills or hemoptysis; No Shortness of Breath  CARDIOVASCULAR: No chest pain, palpitations, passing out, dizziness, or leg swelling  GASTROINTESTINAL: No abdominal or epigastric pain. No nausea, vomiting, or hematemesis; No diarrhea or constipation. No melena or hematochezia.  GENITOURINARY: No dysuria, frequency, hematuria, or incontinence  NEUROLOGICAL: No headaches, memory loss, loss of strength, numbness, or tremors  SKIN: No itching, burning, rashes, or lesions   LYMPH Nodes: No enlarged glands  ENDOCRINE: No heat or cold intolerance; No hair loss  MUSCULOSKELETAL: No joint pain or swelling; No muscle, back, or extremity pain  PSYCHIATRIC: No depression, anxiety, mood swings, or difficulty sleeping  HEME/LYMPH: No easy bruising, or bleeding gums  ALLERGY AND IMMUNOLOGIC: No hives or eczema	    [ ] All others negative	  [ ] Unable to obtain    PHYSICAL EXAM:  T(C): 36.6 (01-14-23 @ 05:22), Max: 36.6 (01-14-23 @ 05:22)  HR: 80 (01-14-23 @ 05:22) (66 - 90)  BP: 85/57 (01-14-23 @ 05:22) (85/57 - 98/61)  RR: 18 (01-14-23 @ 05:22) (18 - 18)  SpO2: 100% (01-14-23 @ 05:22) (97% - 100%)  Wt(kg): --  I&O's Summary    13 Jan 2023 07:01  -  14 Jan 2023 07:00  --------------------------------------------------------  IN: 2020 mL / OUT: 200 mL / NET: 1820 mL    14 Jan 2023 07:01  -  14 Jan 2023 12:04  --------------------------------------------------------  IN: 120 mL / OUT: 0 mL / NET: 120 mL        Appearance: cahchectic  HEENT:   Normal oral mucosa, PERRL, EOMI	  Lymphatic: No lymphadenopathy  Cardiovascular: Normal S1 S2, No JVD, + murmurs, No edema  Respiratory: rhonchi  Psychiatry: A & O x 3, Mood & affect appropriate  Gastrointestinal:  Soft, Non-tender, + BS	  Skin: No rashes, No ecchymoses, No cyanosis	  Neurologic: Non-focal  Extremities: Normal range of motion, No clubbing, cyanosis or edema  Vascular: Peripheral pulses palpable 2+ bilaterally    MEDICATIONS  (STANDING):  enoxaparin Injectable 40 milliGRAM(s) SubCutaneous every 24 hours  lactated ringers. 1000 milliLiter(s) (80 mL/Hr) IV Continuous <Continuous>  mirtazapine 15 milliGRAM(s) Oral at bedtime  morphine   Solution 5 milliGRAM(s) Oral every 4 hours  naloxone Injectable 0.4 milliGRAM(s) IV Push once  polyethylene glycol 3350 17 Gram(s) Oral daily  senna 2 Tablet(s) Oral at bedtime      TELEMETRY: 	    ECG:  	  RADIOLOGY:  OTHER: 	  	  LABS:	 	    CARDIAC MARKERS:            01-14    135  |  104  |  15  ----------------------------<  50<LL>  3.8   |  11<L>  |  <0.30<L>    Ca    7.3<L>      14 Jan 2023 10:15  Mg     .7     01-14    TPro  5.4<L>  /  Alb  2.3<L>  /  TBili  0.1<L>  /  DBili  x   /  AST  31  /  ALT  23  /  AlkPhos  258<H>  01-13    proBNP: Serum Pro-Brain Natriuretic Peptide: 510 pg/mL (01-06 @ 19:43)  Serum Pro-Brain Natriuretic Peptide: 202 pg/mL (12-24 @ 05:41)    Lipid Profile:   HgA1c:   TSH:         Assessment and plan  ---------------------------  Bradycardia, Hypertension, Dyslipidemia, Hydronephrosis, R-nephrostomy tube, Liver cyst, Parathyroid tumor, Chronic pain on morphine 2.5 (last took this morning) and 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 Patient reported the pain is sharp well localized and occasionally pleuritic. Has chronic cough with bloody or brown sputum. Reports feeling dehydrated. Has not had chemo since 5/2022. Patient denied abdominal pain, N/V/D, fever, urinary symptoms, rash  pt with metastatic bladder ca / onc eval  s/p ppm , sss  chest pain doubt cardiac ?metastatic ca bony mets  dvt prophylaxis  severe protein deficiency, nutrition eval   hydration  psych eval start Remeron 15 mg po qhs  pain management  PE has been ruled out several admissions  dvt prophylaxis  nutrition consult severe protein deficiency appreciated  check labs, hypercalcemia, increase fluid,  endocrine eval noted  ?abx check urin  oncology eval appreciated, awaiting ct chest abdomen and pelvis  physical therapy  replete K, keep K>4  doubt pt is a candidate for chemo being so malnurished   replete lytes  ppm was recently checked  hypotension decrease morphine dose may need to add midodrine

## 2023-01-14 NOTE — PROGRESS NOTE ADULT - ASSESSMENT
--------------------------    57  yr   h/o Bradycardia,   HTN/  HLD, Hydronephrosis, R-nephrostomy tube, Liver cyst, Parathyroid tumor, Chronic pain on morphine  PPM     now presenting to the ED via EMS from outpt office for worsening lethargy and central chest pain intermittently 3-4 days  .  Has chronic cough,  Has not had chemo since 5/2022.     Patient denied abdominal pain, N/V/D, fever, urinary symptoms, rash  pt with metastatic bladder ca / onc eval  s/p ppm , sss  chest pain doubt cardiac ?metastatic ca bony mets  dvt prophylaxis  pain management    hypercalcemia, increase fluid,  endocrine   TO  F/P     Ca  bladder  with c/c hydro, R n/stomy  tube. mets to liver/lungs  pt with poor  prognosis  cachexia/  bmi   13   hypogycemia, follow fs/ dextrose   goc  to be  discussed

## 2023-01-14 NOTE — PROGRESS NOTE ADULT - SUBJECTIVE AND OBJECTIVE BOX
afberile  REVIEW OF SYSTEMS:  CONSTITUTIONAL: No fever,  no  weight loss  ENT:  No  tinnitus,   no   vertigo  NECK: No pain or stiffness  RESPIRATORY: No cough, wheezing, chills or hemoptysis;    No Shortness of Breath  CARDIOVASCULAR: No chest pain, palpitations, dizziness  GASTROINTESTINAL: No abdominal or epigastric pain. No nausea, vomiting, or hematemesis; No diarrhea  No melena or hematochezia.  GENITOURINARY: No dysuria, frequency, hematuria, or incontinence  NEUROLOGICAL: No headaches  SKIN: No itching,  no   rash  LYMPH Nodes: No enlarged glands  ENDOCRINE: No heat or cold intolerance  MUSCULOSKELETAL: No joint pain or swelling  PSYCHIATRIC: No depression, anxiety  HEME/LYMPH: No easy bruising, or bleeding gums  ALLERGY AND IMMUNOLOGIC: No hives or eczema	    MEDICATIONS  (STANDING):  enoxaparin Injectable 40 milliGRAM(s) SubCutaneous every 24 hours  lactated ringers. 1000 milliLiter(s) (80 mL/Hr) IV Continuous <Continuous>  mirtazapine 15 milliGRAM(s) Oral at bedtime  morphine   Solution 3 milliGRAM(s) Oral four times a day  naloxone Injectable 0.4 milliGRAM(s) IV Push once  polyethylene glycol 3350 17 Gram(s) Oral daily  senna 2 Tablet(s) Oral at bedtime    MEDICATIONS  (PRN):  acetaminophen     Tablet .. 325 milliGRAM(s) Oral every 6 hours PRN Mild Pain (1 - 3)  aluminum hydroxide/magnesium hydroxide/simethicone Suspension 30 milliLiter(s) Oral every 4 hours PRN Dyspepsia  melatonin 3 milliGRAM(s) Oral at bedtime PRN Insomnia  morphine   Solution 10 milliGRAM(s) Oral every 4 hours PRN Severe Pain (7 - 10)  morphine   Solution 5 milliGRAM(s) Oral every 4 hours PRN Moderate Pain (4 - 6)  ondansetron Injectable 4 milliGRAM(s) IV Push every 8 hours PRN Nausea and/or Vomiting      Vital Signs Last 24 Hrs  T(C): 36.6 (14 Jan 2023 05:22), Max: 36.6 (14 Jan 2023 05:22)  T(F): 97.8 (14 Jan 2023 05:22), Max: 97.8 (14 Jan 2023 05:22)  HR: 80 (14 Jan 2023 05:22) (66 - 80)  BP: 85/57 (14 Jan 2023 05:22) (85/57 - 98/61)  BP(mean): --  RR: 18 (14 Jan 2023 05:22) (18 - 18)  SpO2: 100% (14 Jan 2023 05:22) (97% - 100%)    Parameters below as of 14 Jan 2023 05:22  Patient On (Oxygen Delivery Method): nasal cannula      CAPILLARY BLOOD GLUCOSE        I&O's Summary    13 Jan 2023 07:01  -  14 Jan 2023 07:00  --------------------------------------------------------  IN: 2020 mL / OUT: 200 mL / NET: 1820 mL    14 Jan 2023 07:01  -  14 Jan 2023 12:55  --------------------------------------------------------  IN: 120 mL / OUT: 0 mL / NET: 120 mL          Appearance: Normal	  HEENT:   Normal oral mucosa, PERRL, EOMI	  Lymphatic: No lymphadenopathy  Cardiovascular: Normal S1 S2, No JVD  Respiratory: Lungs clear to auscultation	  Psychiatry: A & O x 3, Mood & affect appropriate  Gastrointestinal:  Soft, Non-tender, + BS	  Skin: No rash, No ecchymoses	  Extremities: Normal range of motion  Vascular: Peripheral pulses palpable bilaterally    LABS:    01-14    135  |  104  |  15  ----------------------------<  50<LL>  3.8   |  11<L>  |  <0.30<L>    Ca    7.3<L>      14 Jan 2023 10:15  Mg     .7     01-14    TPro  5.4<L>  /  Alb  2.3<L>  /  TBili  0.1<L>  /  DBili  x   /  AST  31  /  ALT  23  /  AlkPhos  258<H>  01-13                    Thyroid Stimulating Hormone, Serum: 1.51 uIU/mL (10-18 @ 06:45)          Consultant(s) Notes Reviewed:      Care Discussed with Consultants/Other Providers:

## 2023-01-15 NOTE — PROGRESS NOTE ADULT - ASSESSMENT
_________________________________________________________________________________________  ========>>  M E D I C A L   A T T E N D I N G    F O L L O W  U P  N O T E  <<=========  -----------------------------------------------------------------------------------------------------    - Patient seen and examined by me earlier today.   - In summary,  JEF HOUSE is a 57y year old woman admitted with chest discomfort   - Patient today overall doing ok, comfortable, eating fairly > encouraged as patient appears very weak , supplements ordered, at bedside..       patient states sleepy today as was urinating very opten overnight ...    otherwise patient weak, dizzy with movement !     ==================>> REVIEW OF SYSTEM <<=================    GEN: no fever, no chills, chronic pain in back / flank as above   RESP: no SOB, no cough, no sputum  CVS: no chest pain now reported , no palpitations  GI: no abdominal pain, no nausea  : no dysuria, no frequency, chronic intermittent hematuria, chronic vaginal bleeding   Neuro: no headache, no dizziness  Derm : no itching, no rash    ==================>> PHYSICAL EXAM <<=================    GEN: A&O X 3 , NAD , comfortable, pleasant, calm , cachectic, in bed   HEENT: NCAT, PERRL, MMM, hearing intact, temporal wasting   Neck: supple , no JVD appreciated  CVS: S1S2 , regular , No M/R/G appreciated  PULM: CTA B/L,  no W/R/R appreciated  ABD.: soft. non tender, non distended  Extrem: intact pulses , + B/L Le 2+ edema , cachectic . + nephrostomy tube with clear yellow urine   PSYCH : flat affect, not anxious                              ( Note written / Date of service 01-15-23 )    ==================>> MEDICATIONS <<====================    enoxaparin Injectable 40 milliGRAM(s) SubCutaneous every 24 hours  mirtazapine 15 milliGRAM(s) Oral at bedtime  morphine   Solution 3 milliGRAM(s) Oral four times a day  naloxone Injectable 0.4 milliGRAM(s) IV Push once  polyethylene glycol 3350 17 Gram(s) Oral daily  senna 2 Tablet(s) Oral at bedtime    MEDICATIONS  (PRN):  acetaminophen     Tablet .. 325 milliGRAM(s) Oral every 6 hours PRN Mild Pain (1 - 3)  aluminum hydroxide/magnesium hydroxide/simethicone Suspension 30 milliLiter(s) Oral every 4 hours PRN Dyspepsia  melatonin 3 milliGRAM(s) Oral at bedtime PRN Insomnia  morphine   Solution 5 milliGRAM(s) Oral every 4 hours PRN Moderate Pain (4 - 6)  morphine   Solution 10 milliGRAM(s) Oral every 4 hours PRN Severe Pain (7 - 10)  ondansetron Injectable 4 milliGRAM(s) IV Push every 8 hours PRN Nausea and/or Vomiting    ___________  Active diet:  Diet, Soft and Bite Sized:   Nikita(7 Gm Arginine/7 Gm Glut/1.2 Gm HMB     Qty per Day:  1  Liquid Protein Supplement     Qty per Day:  1  Supplement Feeding Modality:  Oral  Ensure Plus High Protein Cans or Servings Per Day:  2       Frequency:  Two Times a day  Ensure Plant-Based Cans or Servings Per Day:  1       Frequency:  Daily  ___________________    ==================>> VITAL SIGNS <<==================    Vital Signs Last 24 HrsT(C): 36.5 (01-15-23 @ 04:51)  T(F): 97.7 (01-15-23 @ 04:51), Max: 97.8 (01-14-23 @ 22:38)  HR: 67 (01-15-23 @ 04:51) (64 - 78)  BP: 106/64 (01-15-23 @ 04:51)  RR: 18 (01-15-23 @ 04:51) (16 - 18)  SpO2: 99% (01-15-23 @ 04:51) (99% - 99%)      CAPILLARY BLOOD GLUCOSE  POCT Blood Glucose.: 152 mg/dL (14 Jan 2023 22:26)  POCT Blood Glucose.: 163 mg/dL (14 Jan 2023 17:39)  POCT Blood Glucose.: 149 mg/dL (14 Jan 2023 14:07)     ==================>> LAB AND IMAGING <<==================       01-14    135  |  104  |  15  ----------------------------<  50<LL>  3.8   |  11<L>  |  <0.30<L>    Ca    7.3<L>      14 Jan 2023 10:15  Mg     .7     01-14    Creatinine:  <0.30  <<==, 0.70  <<==, 0.72  <<==, 0.70  <<==  Sodium:   135  <==, 143  <==, 142  <==, 142  <==       AST:          31 <== , 28 <==      ALT:        23  <== , 23  <==      AP:        258  <=, 247  <=     Bili:        0.1  <=, 0.1  <=    SARS-CoV-2: Joellen (01-06-23 @ 20:40)    ___________________________________________________________________________________  ===============>>  A S S E S S M E N T   A N D   P L A N <<===============  ------------------------------------------------------------------------------------------    · Assessment	  56 y/o woman with PMH Asthma, Anemia, Bladder cancer with metastases to the lungs, direct invasion to vagina, on chronic home O2 for comfort, Bradycardia / AF post PPM, Dyslipidemia, Hydronephrosis, R-nephrostomy tube, Liver cyst, Parathyroid tumor, Chronic pain on morphine sulfate at home ( not on hospice) presenting to the ED via EMS from oncologist office for worsening lethargy and intermittent central chest pain. Found to have profound hypercalcemia.     Problem/Plan - 1:  ·  Problem: Hypercalcemia of malignancy    improved > observe off iv fluids >> patient drinking PO fluids   -s/p calcitonin and Bisphosphonate administration    -heme/onc f/u  - trend CMP  supplement hypokalemia / hypomagnesemia as needed  keep K~4, Mag ~2    Problem/Plan - 2:  ·  Problem: Leukocytosis.   ·  Plan: -chronic  however with reported productive cough with pleuritic chest pain and elevated PCT     under treatment empirically for possible PNA ( patient declined to have an xray )!   -trend WBC   -s/p ceftriaxone 5 doses and DC     Problem/Plan - 3:  ·  Problem: Bladder cancer.   ·  Plan: -no chemo since 05/2022  onc followup   plan for liver biopsy when patient stable / better.. ?      patient too weak and deconditioned, highly doubtful if ever would be able to get any treatments   patient deconditioned with baseline cachexia, severe protein calorie malnutrition, debility     palliative following: mainly for pain control    Problem/Plan - 4:  ·  Problem: Prophylactic measure.   ·  Plan: -Diet: Regular  -DVT ppx: Lovenox qd.    --------------------------------------------  Case discussed with patient  Education given on findings and plan of care  ___________________________  HLeslee Ochoa D.O.  Pager: [phone]

## 2023-01-15 NOTE — CHART NOTE - NSCHARTNOTEFT_GEN_A_CORE
Full note is forthcoming        In the event of newly developing, evolving, or worsening symptoms, please contact the Palliative Medicine team via pager (if the patient is at [facility] #[phone] or if the patient is at [facility] #[phone]) The Geriatric and Palliative Medicine service has coverage 24 hours a day/ 7 days a week to provide medical recommendations regarding symptom management needs via telephone        Maykel Allen MD   of Geriatric and Palliative Medicine  [facility]    Between the hours of 9 am and 5 pm from Monday through Friday, please contact me on [employer] Teams    After 5pm and on weekends, please see the contact information below:    In the event of newly developing, evolving, or worsening symptoms, please contact the Palliative Medicine team via pager (if the patient is at [facility] #[phone] or if the patient is at [facility] #[phone]) The Geriatric and Palliative Medicine service has coverage 24 hours a day/ 7 days a week to provide medical recommendations regarding symptom management needs via telephone Pain scores reviewed  Lower dose of PRN pain medication started now QID  Discussed with  day and night nurse during their rounds  Pt reports significant lethargy and sleepiness with the previous dose, this is the likely reason for deescalation.  She is reporting significant distress, physical and emotional.   The patient is frustrated by the process of being hospitalized.   She feels a deviation from her baseline and laments the changes      In the event of newly developing, evolving, or worsening symptoms, please contact the Palliative Medicine team via pager (if the patient is at [facility] #[phone] or if the patient is at [facility] #[phone]) The Geriatric and Palliative Medicine service has coverage 24 hours a day/ 7 days a week to provide medical recommendations regarding symptom management needs via telephone

## 2023-01-15 NOTE — PROGRESS NOTE ADULT - SUBJECTIVE AND OBJECTIVE BOX
CARDIOLOGY     PROGRESS  NOTE   ________________________________________________    CHIEF COMPLAINT:Patient is a 57y old  Female who presents with a chief complaint of chest pain (15 Fabrizio 2023 12:28)  no complain  	  REVIEW OF SYSTEMS:  CONSTITUTIONAL: No fever, weight loss, or fatigue  EYES: No eye pain, visual disturbances, or discharge  ENT:  No difficulty hearing, tinnitus, vertigo; No sinus or throat pain  NECK: No pain or stiffness  RESPIRATORY: No cough, wheezing, chills or hemoptysis; No Shortness of Breath  CARDIOVASCULAR: No chest pain, palpitations, passing out, dizziness, or leg swelling  GASTROINTESTINAL: No abdominal or epigastric pain. No nausea, vomiting, or hematemesis; No diarrhea or constipation. No melena or hematochezia.  GENITOURINARY: No dysuria, frequency, hematuria, or incontinence  NEUROLOGICAL: No headaches, memory loss, loss of strength, numbness, or tremors  SKIN: No itching, burning, rashes, or lesions   LYMPH Nodes: No enlarged glands  ENDOCRINE: No heat or cold intolerance; No hair loss  MUSCULOSKELETAL: No joint pain or swelling; No muscle, back, or extremity pain  PSYCHIATRIC: No depression, anxiety, mood swings, or difficulty sleeping  HEME/LYMPH: No easy bruising, or bleeding gums  ALLERGY AND IMMUNOLOGIC: No hives or eczema	    [ ] All others negative	  [x ] Unable to obtain    PHYSICAL EXAM:  T(C): 36.3 (01-15-23 @ 14:00), Max: 36.6 (01-14-23 @ 22:38)  HR: 65 (01-15-23 @ 14:00) (63 - 78)  BP: 96/62 (01-15-23 @ 14:00) (95/62 - 106/64)  RR: 18 (01-15-23 @ 14:00) (16 - 18)  SpO2: 98% (01-15-23 @ 14:00) (98% - 99%)  Wt(kg): --  I&O's Summary    14 Jan 2023 07:01  -  15 Fabrizio 2023 07:00  --------------------------------------------------------  IN: 3040 mL / OUT: 0 mL / NET: 3040 mL        Appearance: Normal	  HEENT:   Normal oral mucosa, PERRL, EOMI	  Lymphatic: No lymphadenopathy  Cardiovascular: Normal S1 S2, No JVD, + murmurs, No edema  Respiratory: Lungs clear to auscultation	  Psychiatry: A & O x 3, Mood & affect appropriate  Gastrointestinal:  Soft, Non-tender, + BS	  Skin: No rashes, No ecchymoses, No cyanosis	  Neurologic: Non-focal  Extremities: Normal range of motion, No clubbing, cyanosis or edema  Vascular: Peripheral pulses palpable 2+ bilaterally    MEDICATIONS  (STANDING):  enoxaparin Injectable 40 milliGRAM(s) SubCutaneous every 24 hours  mirtazapine 15 milliGRAM(s) Oral at bedtime  morphine   Solution 3 milliGRAM(s) Oral four times a day  naloxone Injectable 0.4 milliGRAM(s) IV Push once  polyethylene glycol 3350 17 Gram(s) Oral daily  senna 2 Tablet(s) Oral at bedtime      TELEMETRY: 	    ECG:  	  RADIOLOGY:  OTHER: 	  	  LABS:	 	    CARDIAC MARKERS:            01-14    135  |  104  |  15  ----------------------------<  50<LL>  3.8   |  11<L>  |  <0.30<L>    Ca    7.3<L>      14 Jan 2023 10:15  Mg     .7     01-14      proBNP: Serum Pro-Brain Natriuretic Peptide: 510 pg/mL (01-06 @ 19:43)  Serum Pro-Brain Natriuretic Peptide: 202 pg/mL (12-24 @ 05:41)    Lipid Profile:   HgA1c:   TSH:         Assessment and plan  ---------------------------  Bradycardia, Hypertension, Dyslipidemia, Hydronephrosis, R-nephrostomy tube, Liver cyst, Parathyroid tumor, Chronic pain on morphine 2.5 (last took this morning) and 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 Patient reported the pain is sharp well localized and occasionally pleuritic. Has chronic cough with bloody or brown sputum. Reports feeling dehydrated. Has not had chemo since 5/2022. Patient denied abdominal pain, N/V/D, fever, urinary symptoms, rash  pt with metastatic bladder ca / onc eval  s/p ppm , sss  chest pain doubt cardiac ?metastatic ca bony mets  dvt prophylaxis  severe protein deficiency, nutrition eval   hydration  psych eval start Remeron 15 mg po qhs  pain management  PE has been ruled out several admissions  dvt prophylaxis  nutrition consult severe protein deficiency appreciated  check labs, hypercalcemia, increase fluid,  endocrine eval noted  ?abx check urin  oncology eval appreciated, awaiting ct chest abdomen and pelvis  physical therapy  replete K, keep K>4  doubt pt is a candidate for chemo being so malnourished   replete lytes  ppm was recently checked  hypotension decrease morphine dose may need to add midodrine  may decrease morphine to three times per day

## 2023-01-16 NOTE — PROGRESS NOTE ADULT - SUBJECTIVE AND OBJECTIVE BOX
CARDIOLOGY     PROGRESS  NOTE   ________________________________________________    CHIEF COMPLAINT:Patient is a 57y old  Female who presents with a chief complaint of chest pain (15 Fabrizio 2023 16:08)  doing better, pain is better controlled  	  REVIEW OF SYSTEMS:  CONSTITUTIONAL: No fever, weight loss, or fatigue  EYES: No eye pain, visual disturbances, or discharge  ENT:  No difficulty hearing, tinnitus, vertigo; No sinus or throat pain  NECK: No pain or stiffness  RESPIRATORY: No cough, wheezing, chills or hemoptysis; No Shortness of Breath  CARDIOVASCULAR: No chest pain, palpitations, passing out, dizziness, or leg swelling  GASTROINTESTINAL: No abdominal or epigastric pain. No nausea, vomiting, or hematemesis; No diarrhea or constipation. No melena or hematochezia.  GENITOURINARY: No dysuria, frequency, hematuria, or incontinence  NEUROLOGICAL: No headaches, memory loss, loss of strength, numbness, or tremors  SKIN: No itching, burning, rashes, or lesions   LYMPH Nodes: No enlarged glands  ENDOCRINE: No heat or cold intolerance; No hair loss  MUSCULOSKELETAL: No joint pain or swelling; No muscle, back, or extremity pain  PSYCHIATRIC: No depression, anxiety, mood swings, or difficulty sleeping  HEME/LYMPH: No easy bruising, or bleeding gums  ALLERGY AND IMMUNOLOGIC: No hives or eczema	    [ x] All others negative	  [ ] Unable to obtain    PHYSICAL EXAM:  T(C): 36.4 (01-16-23 @ 06:15), Max: 36.5 (01-16-23 @ 03:10)  HR: 74 (01-16-23 @ 06:15) (63 - 76)  BP: 92/55 (01-16-23 @ 06:15) (92/55 - 113/74)  RR: 18 (01-16-23 @ 06:15) (16 - 18)  SpO2: 99% (01-16-23 @ 06:15) (98% - 99%)  Wt(kg): --  I&O's Summary    15 Fabrizio 2023 07:01  -  16 Jan 2023 07:00  --------------------------------------------------------  IN: 1120 mL / OUT: 0 mL / NET: 1120 mL        Appearance: Normal	  HEENT:   Normal oral mucosa, PERRL, EOMI	  Lymphatic: No lymphadenopathy  Cardiovascular: Normal S1 S2, No JVD, + murmurs, No edema  Respiratory: rhonchi  Psychiatry: depress  Gastrointestinal:  Soft, Non-tender, + BS	  Skin: No rashes, No ecchymoses, No cyanosis	  Neurologic: Non-focal  Extremities: Normal range of motion, No clubbing, cyanosis or edema  Vascular: Peripheral pulses palpable 2+ bilaterally    MEDICATIONS  (STANDING):  enoxaparin Injectable 40 milliGRAM(s) SubCutaneous every 24 hours  midodrine. 5 milliGRAM(s) Oral three times a day  mirtazapine 15 milliGRAM(s) Oral at bedtime  morphine   Solution 3 milliGRAM(s) Oral four times a day  naloxone Injectable 0.4 milliGRAM(s) IV Push once  polyethylene glycol 3350 17 Gram(s) Oral daily  senna 2 Tablet(s) Oral at bedtime      TELEMETRY: 	    ECG:  	  RADIOLOGY:  OTHER: 	  	  LABS:	 	    CARDIAC MARKERS:            01-14    135  |  104  |  15  ----------------------------<  50<LL>  3.8   |  11<L>  |  <0.30<L>    Ca    7.3<L>      14 Jan 2023 10:15  Mg     .7     01-14      proBNP: Serum Pro-Brain Natriuretic Peptide: 510 pg/mL (01-06 @ 19:43)  Serum Pro-Brain Natriuretic Peptide: 202 pg/mL (12-24 @ 05:41)    Lipid Profile:   HgA1c:   TSH:         Assessment and plan  ---------------------------  Bradycardia, Hypertension, Dyslipidemia, Hydronephrosis, R-nephrostomy tube, Liver cyst, Parathyroid tumor, Chronic pain on morphine 2.5 (last took this morning) and 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 Patient reported the pain is sharp well localized and occasionally pleuritic. Has chronic cough with bloody or brown sputum. Reports feeling dehydrated. Has not had chemo since 5/2022. Patient denied abdominal pain, N/V/D, fever, urinary symptoms, rash  pt with metastatic bladder ca / onc eval  s/p ppm , sss  chest pain doubt cardiac ?metastatic ca bony mets  dvt prophylaxis  severe protein deficiency, nutrition eval   hydration  psych eval start Remeron 15 mg po qhs  pain management  PE has been ruled out several admissions  dvt prophylaxis  nutrition consult severe protein deficiency appreciated  check labs, hypercalcemia, increase fluid,  endocrine eval noted  ?abx check urin  oncology eval appreciated, awaiting ct chest abdomen and pelvis  physical therapy  replete K, keep K>4  doubt pt is a candidate for chemo being so malnourished   replete lytes  ppm was recently checked  hypotension decrease morphine dose may need to add midodrine  may decrease morphine to three times per day, bp better controlled  ?hospice inpatient

## 2023-01-16 NOTE — PROGRESS NOTE ADULT - ASSESSMENT
_________________________________________________________________________________________  ========>>  M E D I C A L   A T T E N D I N G    F O L L O W  U P  N O T E  <<=========  -----------------------------------------------------------------------------------------------------    - Patient seen and examined by me earlier today.   - In summary,  JEF HOUSE is a 57y year old woman admitted with chest discomfort   - Patient today overall doing ok, comfortable, eating fairly > encouraged as patient appears very weak , supplements ordered, at bedside..       patient states sleepy today as was urinating very opten overnight ...    otherwise patient weak, dizzy with movement !     ==================>> REVIEW OF SYSTEM <<=================    GEN: no fever, no chills, chronic pain in back / flank as above   RESP: no SOB, no cough, no sputum  CVS: no chest pain now reported , no palpitations  GI: no abdominal pain, no nausea  : no dysuria, no frequency, chronic intermittent hematuria, chronic vaginal bleeding   Neuro: no headache, no dizziness  Derm : no itching, no rash    ==================>> PHYSICAL EXAM <<=================    GEN: A&O X 3 , NAD , comfortable, pleasant, calm , cachectic, in bed   HEENT: NCAT, PERRL, MMM, hearing intact, temporal wasting   Neck: supple , no JVD appreciated  CVS: S1S2 , regular , No M/R/G appreciated  PULM: CTA B/L,  no W/R/R appreciated  ABD.: soft. non tender, non distended  Extrem: intact pulses , + B/L Le 2+ edema , cachectic . + nephrostomy tube with clear yellow urine   PSYCH : flat affect, not anxious                               ( Note written / Date of service 01-16-23 )    ==================>> MEDICATIONS <<====================    enoxaparin Injectable 40 milliGRAM(s) SubCutaneous every 24 hours  midodrine. 5 milliGRAM(s) Oral three times a day  mirtazapine 15 milliGRAM(s) Oral at bedtime  morphine   Solution 3 milliGRAM(s) Oral four times a day  naloxone Injectable 0.4 milliGRAM(s) IV Push once  polyethylene glycol 3350 17 Gram(s) Oral daily  senna 2 Tablet(s) Oral at bedtime    MEDICATIONS  (PRN):  acetaminophen     Tablet .. 325 milliGRAM(s) Oral every 6 hours PRN Mild Pain (1 - 3)  aluminum hydroxide/magnesium hydroxide/simethicone Suspension 30 milliLiter(s) Oral every 4 hours PRN Dyspepsia  melatonin 3 milliGRAM(s) Oral at bedtime PRN Insomnia  morphine   Solution 5 milliGRAM(s) Oral every 4 hours PRN Moderate Pain (4 - 6)  morphine   Solution 10 milliGRAM(s) Oral every 4 hours PRN Severe Pain (7 - 10)  ondansetron Injectable 4 milliGRAM(s) IV Push every 8 hours PRN Nausea and/or Vomiting    ___________  Active diet:  Diet, Soft and Bite Sized:   Nikita(7 Gm Arginine/7 Gm Glut/1.2 Gm HMB     Qty per Day:  1  Liquid Protein Supplement     Qty per Day:  1  Supplement Feeding Modality:  Oral  Ensure Plus High Protein Cans or Servings Per Day:  2       Frequency:  Two Times a day  Ensure Plant-Based Cans or Servings Per Day:  1       Frequency:  Daily  ___________________    ==================>> VITAL SIGNS <<==================    Vital Signs Last 24 HrsT(C): 37.8 (01-16-23 @ 12:18)  T(F): 100 (01-16-23 @ 12:18), Max: 100 (01-16-23 @ 12:18)  HR: 78 (01-16-23 @ 12:18) (67 - 78)  BP: 100/68 (01-16-23 @ 12:18)  RR: 18 (01-16-23 @ 12:18) (18 - 18)  SpO2: 99% (01-16-23 @ 12:18) (98% - 99%)      CAPILLARY BLOOD GLUCOSE      POCT Blood Glucose.: 74 mg/dL (16 Jan 2023 12:20)  POCT Blood Glucose.: 84 mg/dL (16 Jan 2023 09:08)  POCT Blood Glucose.: 64 mg/dL (16 Jan 2023 09:06)  POCT Blood Glucose.: 111 mg/dL (15 Fabrizio 2023 17:18)     ==================>> LAB AND IMAGING <<==================                        7.7    14.89 )-----------( x        ( 16 Jan 2023 13:50 )             26.0        01-16    147<H>  |  111<H>  |  30<H>  ----------------------------<  67<L>  3.1<L>   |  26  |  0.77    Ca    9.4      16 Jan 2023 13:50  Mg     1.9     01-16    TPro  5.6<L>  /  Alb  2.2<L>  /  TBili  0.3  /  DBili  x   /  AST  32  /  ALT  29  /  AlkPhos  339<H>  01-16    WBC count:   14.89 <<==   Hemoglobin:   7.7 <<==  platelets:      Creatinine:  0.77  <<==, <0.30  <<==, 0.70  <<==, 0.72  <<==  Sodium:   147  <==, 135  <==, 143  <==, 142  <==       AST:          32 <== , 31 <== , 28 <==      ALT:        29  <== , 23  <== , 23  <==      AP:        339  <=, 258  <=, 247  <=     Bili:        0.3  <=, 0.1  <=, 0.1  <=    ____________________________    M I C R O B I O L O G Y :    Culture - Urine (collected 09 Jan 2023 21:36)  Source: Clean Catch Clean Catch (Midstream)  Final Report (10 Fabrizio 2023 23:13):    No growth        SARS-CoV-2: Joellen (01-06-23 @ 20:40)    ___________________________________________________________________________________  ===============>>  A S S E S S M E N T   A N D   P L A N <<===============  ------------------------------------------------------------------------------------------    · Assessment	  56 y/o woman with PMH Asthma, Anemia, Bladder cancer with metastases to the lungs, direct invasion to vagina, on chronic home O2 for comfort, Bradycardia / AF post PPM, Dyslipidemia, Hydronephrosis, R-nephrostomy tube, Liver cyst, Parathyroid tumor, Chronic pain on morphine sulfate at home ( not on hospice) presenting to the ED via EMS from oncologist office for worsening lethargy and intermittent central chest pain. Found to have profound hypercalcemia.     Problem/Plan - 1:  ·  Problem: Hypercalcemia of malignancy    improved > observe off iv fluids >> patient drinking PO fluids   -s/p calcitonin and Bisphosphonate administration    -heme/onc f/u  - trend CMP  supplement hypokalemia / hypomagnesemia as needed  keep K~4, Mag ~2    Problem/Plan - 2:  ·  Problem: Leukocytosis.   ·  Plan: -chronic  however with reported productive cough with pleuritic chest pain and elevated PCT     under treatment empirically for possible PNA ( patient declined to have an xray )!   -trend WBC   -s/p ceftriaxone 5 doses and DC     Problem/Plan - 3:  ·  Problem: Bladder cancer.   ·  Plan: -no chemo since 05/2022  onc followup   plan for liver biopsy when patient stable / better.. ?      patient too weak and deconditioned, highly doubtful if ever would be able to get any treatments   patient deconditioned with baseline cachexia, severe protein calorie malnutrition, debility     palliative following: mainly for pain control    Problem/Plan - 4:  ·  Problem: Prophylactic measure.   ·  Plan: -Diet: Regular  -DVT ppx: Lovenox qd.    --------------------------------------------  Case discussed with patient  Education given on findings and plan of care  ___________________________  HLeslee Ochoa D.O.  Pager: [phone]       _________________________________________________________________________________________  ========>>  M E D I C A L   A T T E N D I N G    F O L L O W  U P  N O T E  <<=========  -----------------------------------------------------------------------------------------------------    - Patient seen and examined by me earlier today.   - In summary,  JEF HOUSE is a 57y year old woman admitted with chest discomfort   - Patient today overall doing ok, comfortable, eating fairly > encouraged as patient appears very weak , encouraged PO intake       otherwise patient weak, dizzy with movement !    unclear why no labs drawn .. > phlebotomy called..     ==================>> REVIEW OF SYSTEM <<=================    GEN: no fever, no chills, chronic pain in back / flank   RESP: no SOB, no cough, no sputum  CVS: no chest pain now reported , no palpitations  GI: no abdominal pain, no nausea  : no dysuria, no frequency, chronic intermittent hematuria, chronic vaginal bleeding   Neuro: no headache, + dizziness with movement   Derm : no itching, no rash    ==================>> PHYSICAL EXAM <<=================    GEN: A&O X 3 , NAD , comfortable, pleasant, calm , cachectic, in bed   HEENT: NCAT, PERRL, MMM, hearing intact, temporal wasting   Neck: supple , no JVD appreciated  CVS: S1S2 , regular , No M/R/G appreciated  PULM: CTA B/L,  no W/R/R appreciated  ABD.: soft. non tender, non distended  Extrem: intact pulses , + B/L Le 2+ edema , cachectic . + nephrostomy tube with clear yellow urine   PSYCH : flat affect, not anxious                               ( Note written / Date of service 01-16-23 )    ==================>> MEDICATIONS <<====================    enoxaparin Injectable 40 milliGRAM(s) SubCutaneous every 24 hours  midodrine. 5 milliGRAM(s) Oral three times a day  mirtazapine 15 milliGRAM(s) Oral at bedtime  morphine   Solution 3 milliGRAM(s) Oral four times a day  naloxone Injectable 0.4 milliGRAM(s) IV Push once  polyethylene glycol 3350 17 Gram(s) Oral daily  senna 2 Tablet(s) Oral at bedtime    MEDICATIONS  (PRN):  acetaminophen     Tablet .. 325 milliGRAM(s) Oral every 6 hours PRN Mild Pain (1 - 3)  aluminum hydroxide/magnesium hydroxide/simethicone Suspension 30 milliLiter(s) Oral every 4 hours PRN Dyspepsia  melatonin 3 milliGRAM(s) Oral at bedtime PRN Insomnia  morphine   Solution 5 milliGRAM(s) Oral every 4 hours PRN Moderate Pain (4 - 6)  morphine   Solution 10 milliGRAM(s) Oral every 4 hours PRN Severe Pain (7 - 10)  ondansetron Injectable 4 milliGRAM(s) IV Push every 8 hours PRN Nausea and/or Vomiting    ___________  Active diet:  Diet, Soft and Bite Sized:   Nikita(7 Gm Arginine/7 Gm Glut/1.2 Gm HMB     Qty per Day:  1  Liquid Protein Supplement     Qty per Day:  1  Supplement Feeding Modality:  Oral  Ensure Plus High Protein Cans or Servings Per Day:  2       Frequency:  Two Times a day  Ensure Plant-Based Cans or Servings Per Day:  1       Frequency:  Daily  ___________________    ==================>> VITAL SIGNS <<==================    Vital Signs Last 24 HrsT(C): 37.8 (01-16-23 @ 12:18)  T(F): 100 (01-16-23 @ 12:18), Max: 100 (01-16-23 @ 12:18)  HR: 78 (01-16-23 @ 12:18) (67 - 78)  BP: 100/68 (01-16-23 @ 12:18)  RR: 18 (01-16-23 @ 12:18) (18 - 18)  SpO2: 99% (01-16-23 @ 12:18) (98% - 99%)      POCT Blood Glucose.: 74 mg/dL (16 Jan 2023 12:20)  POCT Blood Glucose.: 84 mg/dL (16 Jan 2023 09:08)  POCT Blood Glucose.: 64 mg/dL (16 Jan 2023 09:06)  POCT Blood Glucose.: 111 mg/dL (15 Fabrizio 2023 17:18)     ==================>> LAB AND IMAGING <<==================                        7.7    14.89 )-----------( x        ( 16 Jan 2023 13:50 )             26.0        01-16    147<H>  |  111<H>  |  30<H>  ----------------------------<  67<L>  3.1<L>   |  26  |  0.77    Ca    9.4      16 Jan 2023 13:50  Mg     1.9     01-16    TPro  5.6<L>  /  Alb  2.2<L>  /  TBili  0.3  /  DBili  x   /  AST  32  /  ALT  29  /  AlkPhos  339<H>  01-16    WBC count:   14.89 <<==   Hemoglobin:   7.7 <<==    Creatinine:  0.77  <<==, <0.30  <<==, 0.70  <<==, 0.72  <<==  Sodium:   147  <==, 135  <==, 143  <==, 142  <==       AST:          32 <== , 31 <== , 28 <==      ALT:        29  <== , 23  <== , 23  <==      AP:        339  <=, 258  <=, 247  <=     Bili:        0.3  <=, 0.1  <=, 0.1  <=    ____________________________    M I C R O B I O L O G Y :    Culture - Urine (collected 09 Jan 2023 21:36)  Source: Clean Catch Clean Catch (Midstream)  Final Report (10 Fabrizio 2023 23:13):    No growth    SARS-CoV-2: Seymour (01-06-23 @ 20:40)    ___________________________________________________________________________________  ===============>>  A S S E S S M E N T   A N D   P L A N <<===============  ------------------------------------------------------------------------------------------    · Assessment	  56 y/o woman with PMH Asthma, Anemia, Bladder cancer with metastases to the lungs, direct invasion to vagina, on chronic home O2 for comfort, Bradycardia / AF post PPM, Dyslipidemia, Hydronephrosis, R-nephrostomy tube, Liver cyst, Parathyroid tumor, Chronic pain on morphine sulfate at home ( not on hospice) presenting to the ED via EMS from oncologist office for worsening lethargy and intermittent central chest pain. Found to have profound hypercalcemia.     Problem/Plan - 1:  ·  Problem: Hypercalcemia of malignancy    improved > observe off iv fluids >> patient drinking PO fluids   -s/p calcitonin and Bisphosphonate administration    -heme/onc f/u  - trend CMP    supplement hypokalemia / hypomagnesemia as needed  keep K~4, Mag ~2    Problem/Plan - 2:  ·  Problem: Leukocytosis.  improved   ·  Plan: -chronic  however with reported productive cough with pleuritic chest pain and elevated PCT     under treatment empirically for possible PNA ( patient declined to have an xray )!     ** Anemia of chronic disease  - PRBC as ordered    monitor post    Problem/Plan - 3:  ·  Problem: Bladder cancer.   ·  Plan: -no chemo since 05/2022  onc followup   plan for liver biopsy when patient stable / better.. ?      patient too weak and deconditioned, highly doubtful if ever would be able to get any treatments   patient deconditioned with baseline cachexia, severe protein calorie malnutrition, debility     palliative following: mainly for pain control    Problem/Plan - 4:  ·  Problem: Prophylactic measure.   ·  Plan: -Diet: Regular  -DVT ppx: Lovenox qd.    --------------------------------------------  Case discussed with patient, RN, NP  Education given on findings and plan of care  ___________________________  LUIS ANGEL Ochoa D.O.  Pager: [phone]

## 2023-01-17 NOTE — CHART NOTE - NSCHARTNOTEFT_GEN_A_CORE
· Note Type	Event Note      Medicine NP note:     Pt. will require a transport wheelchair due to metastatic bladder  cancer    The beneficiary has a mobility limitation that significantly impairs his ability to participation one or more ADL Such as toileting, feeding, dressing, grooming and bathing in customary locations in the home.   The patient's mobility limitation cannot be sufficiently resolved by the use of an appropriately fitted cane or walker. The patient is unable to ambulate with a walker. Use of a manual wheelchair will significantly improve the beneficiary's ability to participate in ADL and the beneficiary will use it on a regular basis in the home. The beneficiary is able and willing to use the wheelchair in the home. The beneficiary has a caregiver who is available, willing and able to provide assistance with the wheelchair. The patient is not able to self propel a standard or lightweight wheelchair.  Ross Esqueda NP-C [id number]

## 2023-01-17 NOTE — CHART NOTE - NSCHARTNOTEFT_GEN_A_CORE
Pt with metastic cancer , very deconditioned will benefit form 3:1 commode to assist with her ADL .   Ross Esqueda NP-C [id number]

## 2023-01-17 NOTE — PROGRESS NOTE ADULT - SUBJECTIVE AND OBJECTIVE BOX
SUBJECTIVE AND OBJECTIVE:  Sitting up in bed, awake and alert.  Frail appearing complaining of pain.  Indication for Geriatrics and Palliative Care Services/INTERVAL HPI:  pain /symptom management     OVERNIGHT EVENTS: Pt on Morphine 3mg morphine solution ATC ( did not take 1 dose yesterday) and required 2 doses of Morphine solution 5mg prn for breakthrough pain.  Pt crying this morning saying that the medication is not enough and that she would like it increase back to the previous dose as it provided relief of pain     DNR on chart:  Allergies    No Known Allergies    Intolerances    MEDICATIONS  (STANDING):  cefTRIAXone   IVPB 1000 milliGRAM(s) IV Intermittent every 24 hours  chlorhexidine 2% Cloths 1 Application(s) Topical <User Schedule>  enoxaparin Injectable 40 milliGRAM(s) SubCutaneous every 24 hours  lactated ringers. 1000 milliLiter(s) (120 mL/Hr) IV Continuous <Continuous>  mirtazapine 15 milliGRAM(s) Oral at bedtime  morphine   Solution 5 milliGRAM(s) Oral every 4 hours  naloxone Injectable 0.4 milliGRAM(s) IV Push once  polyethylene glycol 3350 17 Gram(s) Oral daily  senna 2 Tablet(s) Oral at bedtime    MEDICATIONS  (PRN):  acetaminophen     Tablet .. 325 milliGRAM(s) Oral every 6 hours PRN Mild Pain (1 - 3)  aluminum hydroxide/magnesium hydroxide/simethicone Suspension 30 milliLiter(s) Oral every 4 hours PRN Dyspepsia  melatonin 3 milliGRAM(s) Oral at bedtime PRN Insomnia  morphine   Solution 10 milliGRAM(s) Oral every 4 hours PRN Severe Pain (7 - 10)  morphine   Solution 5 milliGRAM(s) Oral every 4 hours PRN Moderate Pain (4 - 6)  ondansetron Injectable 4 milliGRAM(s) IV Push every 8 hours PRN Nausea and/or Vomiting        ITEMS UNCHECKED ARE NOT PRESENT    PRESENT SYMPTOMS: [ ]Unable to self-report - see [ ] CPOT [ ] PAINADS [ ] RDOS  Source if other than patient:  [ ]Family   [ ]Team     Pain:  [x ]yes [ ]no  QOL impact -   Location -      lower abdomen    perineum          Aggravating factors -  movement / positional   Quality - dull achy   Radiation - none  Timing- constant  Severity (0-10 scale):  7/10 at time of assessment   Minimal acceptable level (0-10 scale):     CPOT:    https://www.[facility].org/getattachment/orc02p84-2x8n-2m6z-1x1q-2788e2615w8c/Critical-Care-Pain-Observation-Tool-(CPOT)    PAIN AD Score:	  [email or web address]://[email or web address]/painad.pdf (Ctrl + left click to view)    Dyspnea:                           [ ]Mild [ ]Moderate [ ]Severe    RDOS:  0 to 2  minimal or no respiratory distress   3  mild distress  4 to 6 moderate distress  >7 severe distress  https://[email or web address]/handouts/hen/Respiratory_Distress_Observation_Scale.pdf (Ctrl +  left click to view)     Anxiety:                             [ ]Mild [ x]Moderate [ ]Severe  Fatigue:                             [ ]Mild [x ]Moderate [ ]Severe  Nausea:                            [ ]Mild [ ]Moderate [ ]Severe  Loss of appetite:               [ ]Mild [x ]Moderate [ x]Severe  Constipation:                    [ ]Mild [ ]Moderate [ ]Severe    PCSSQ[Palliative Care Spiritual Screening Question]   Severity (0-10):  3  Score of 4 or > indicate consideration of Chaplaincy referral.  Chaplaincy Referral: [ x] yes [ ] refused [ ] following  deferred     Caregiver [place]? : [ ] yes [ ] no  Social work referral [ ] Patient & Family Centered Care Referral [ ]   UNKNOWN     Anticipatory Grief present?:  [ ] yes [ ] no  Social work referral [ ] Patient & Family Centered Care Referral [ ]      Other Symptoms:  [x ]All other review of systems negative     Palliative Performance Status Version 2:        30 %      [email or web address]://[email or web address]/files/news/palliative_performance_scale_[email or web address].pdf  PHYSICAL EXAM:    Vital Signs Last 24 Hrs  T(C): 36.2 (01-12-23 @ 05:20), Max: 36.7 (01-11-23 @ 20:20)  T(F): 97.2 (01-12-23 @ 05:20), Max: 98 (01-11-23 @ 20:20)  HR: 65 (01-12-23 @ 05:20) (58 - 80)  BP: 113/66 (01-12-23 @ 05:20) (96/60 - 113/66)  BP(mean): --  RR: 18 (01-12-23 @ 05:20) (18 - 18)  SpO2: 100% (01-12-23 @ 05:20) (99% - 100%)         GENERAL: [x ]Cachexia    [ x]Alert  [ x]Oriented x 4  [ ]Lethargic  [ ]Unarousable  [x ]Verbal  [ ]Non-Verbal  Behavioral:   [ x]Anxiety  [ ]Delirium [ ]Agitation [ ]Other  HEENT:  [ ]Normal   [x ]Dry mouth   [ ]ET Tube/Trach  [ ]Oral lesions  PULMONARY:   [ x]Clear [ ]Tachypnea  [ ]Audible excessive secretions   [ ]Rhonchi        [ ]Right [ ]Left [ ]Bilateral  [ ]Crackles        [ ]Right [ ]Left [ ]Bilateral  [ ]Wheezing     [ ]Right [ ]Left [ ]Bilateral  [ ]Diminished BS [ ] Right [ ]Left [ ]Bilateral  CARDIOVASCULAR:    [x ]Regular [ ]Irregular [ ]Tachy  [ ]Bradley [ ]Murmur [ ]Other  GASTROINTESTINAL:  [ x]Softly distended   [ ]Distended   [x ]+BS  [ ]Non tender [ ]Tender  [ ]Other [ ]PEG [ ]OGT/ NGT   Last BM:   GENITOURINARY: spotting with BRB from perineum on tissues  [x ]Normal [ ]Incontinent   [ ]Oliguria/Anuria   [ ]Lloyd  MUSCULOSKELETAL:   [ ]Normal   [x ]Weakness  [ ]Bed/Wheelchair bound [ ]Edema  NEUROLOGIC:   [ x]No focal deficits  [ ] Cognitive impairment  [ ] Dysphagia [ ]Dysarthria [ ] Paresis [ ]Other   SKIN:   [ ]Normal  [ ]Rash  [ ]Other  [ x]Pressure ulcer(s) Stage 2 sacrum [ ]y [ ]n present on admission    CRITICAL CARE:  [ ]Shock Present  [ ]Septic [ ]Cardiogenic [ ]Neurologic [ ]Hypovolemic  [ ]Vasopressors [ ]Inotropes  [ ]Respiratory failure present [ ]Mechanical Ventilation [ ]Non-invasive ventilatory support [ ]High-Flow   [ ]Acute  [ ]Chronic [ ]Hypoxic  [ ]Hypercarbic [ ]Other  [ ]Other organ failure     LABS:                          6.1    12.30 )-----------( 95       ( 16 Jan 2023 16:52 )             22.3   01-16    146<H>  |  110<H>  |  32<H>  ----------------------------<  92  3.1<L>   |  26  |  0.83    Ca    9.3      16 Jan 2023 16:52  Mg     1.9     01-16    TPro  5.6<L>  /  Alb  2.2<L>  /  TBili  0.3  /  DBili  x   /  AST  32  /  ALT  29  /  AlkPhos  339<H>  01-16        RADIOLOGY & ADDITIONAL STUDIES:      Protein Calorie Malnutrition Present: [ ]mild [ ]moderate [ ]severe [ ]underweight [ ]morbid obesity  Consult Type: Non Face-to-Face.     Time-Base Billing for Non-Face-to-Face consult (Minutes) 5-10.    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 Recently d/c from  on Dec 21th for SOB. Back in  [facility] on 24th for blood transfusion now presenting to the ED via EMS from outpt office for worsening lethargy and intermittent central chest pain. IR consulted for liver biopsy.     Allergies: NKDA  Medications (Abx/Cardiac/Anticoagulation/Blood Products)  azithromycin  IVPB: 255 mL/Hr IV Intermittent (01-08 @ 21:48)  cefTRIAXone   IVPB: 100 mL/Hr IV Intermittent (01-08 @ 04:34)  cefTRIAXone   IVPB: 100 mL/Hr IV Intermittent (01-09 @ 05:31)  enoxaparin Injectable: 40 milliGRAM(s) SubCutaneous (01-09 @ 13:22)    Data:  T(C): 36.3  HR: 82  BP: 106/65  RR: 18  SpO2: 100%    -WBC 15.88 / HgB 8.7 / Hct 28.9 / Plt 232  -Na 142 / Cl 105 / BUN 34 / Glucose 76  -K 3.2 / CO2 20 / Cr 0.85  -ALT -- / Alk Phos -- / T.Bili --  -INR 1.27 / PTT 30.7    Radiology: pending CT    Assessment/Plan: 57F PMH Asthma, Anemia, Bladder cancer with  metastases to the lungs(patient of Dr Kan) on chronic home O2, Bradycardia, Hypertension, Dyslipidemia, Hydronephrosis, R-nephrostomy tube, Liver cyst, Parathyroid tumor, Chronic pain on morphine 2.5 (last took this morning) and 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 Recently d/c from  on Dec 21th for SOB. Back in  ER on 24th for blood transfusion now presenting to the ED via EMS from outpt office for worsening lethargy and intermittent central chest pain. IR consulted for liver biopsy.     **consult in progress**  < from: CT Abdomen and Pelvis No Cont (10.17.22 @ 17:54) >    ACC: [id number] EXAM:  CT CHEST                        ACC: [id number] EXAM:  CT ABDOMEN AND PELVIS                          PROCEDURE DATE:  10/17/2022          INTERPRETATION:  CLINICAL INFORMATION: Left hip and left back pain.   Concern for metastasis or stones.    COMPARISON: CT chest, abdomen and pelvis from 9/20/2022.    CONTRAST/COMPLICATIONS:  IV Contrast: None.  Oral Contrast: None.  Complications: None reported.    PROCEDURE:  CT of the Chest, Abdomen and Pelvis was performed.  Sagittal and coronal reformats were performed.    FINDINGS:  CHEST:  LUNGS AND LARGE AIRWAYS: Redemonstration of innumerable bilateral   pulmonary nodules consistent with metastases. A few of the largest   pulmonary nodules are mildly increased in size. For example, left lower   lobe nodule measures 3.3 x 2.3 cm (2:116), previously 2.8 x 2.0 cm on   9/20/2022 . A right lower lobe nodule measures 2.4 x 2.2 cm (2:103),   previously 2.2 x 2.1 cm.  PLEURA: No pleural effusion.  VESSELS: Within normal limits.  HEART: Heart size is normal. Small pericardial effusion.  MEDIASTINUM AND FLORENTIN: No lymphadenopathy.  CHEST WALL AND LOWER NECK: Left chest wall pacemaker with leads in the   right atrium and ventricle.    ABDOMEN AND PELVIS:  LIVER: Interval increase in size of hepatic metastases. For reference,   segment 7 mass measures 4.0 x 2.3 cm (2:148), previously 3.3 x 2.2 cm.  BILE DUCTS: Normal caliber.  GALLBLADDER: Within normal limits.  SPLEEN: Within normal limits.  PANCREAS: Within normal limits.  ADRENALS: Within normal limits.  KIDNEYS/URETERS: Right percutaneous nephrostomy catheter; no right   hydronephrosis. 0.4 cm non obstructing stone in the right lower pole. Left   kidney is enlarged with severe hydroureteronephrosis and markedly dilated   calyces, unchanged. There are nonobstructive stones of the left kidney.    BLADDER: Large bladder mass is without significant change.  REPRODUCTIVE ORGANS: Uterus and adnexa within normal limits.    BOWEL: No bowel obstruction. Appendix is normal.  PERITONEUM: No ascites.  VESSELS: Within normal limits.  RETROPERITONEUM/LYMPH NODES: Retroperitoneal and pelvic lymphadenopathy   is unchanged.  ABDOMINAL WALL: Within normal limits.  BONES: No evidence of osseous metastatic disease.    IMPRESSION:  1. Large bladder mass is without significant change. Severe chronic   hydronephrosis of the left kidney is unchanged. Right nephrostomy tube in   place; no right hydronephrosis.  2.  Numerous bilateral pulmonary metastases; mild interval increase in   size of a few of the largest pulmonary metastases compared to 9/20/2022.  3.  Progression of liver metastases.  4.  No evidence of osseous metastatic disease.        --- End of Report ---    < end of copied text >  https://www.andeal.org/vault/2440/web/files/ONC/Table_Clinical%20Characteristics%20to%20Document%20Malnutrition-White%20JV%20et%20al%202012.pdf    Height (cm): 162.6 (01-07-23 @ 23:00), 162.6 (12-23-22 @ 22:59), 162.6 (12-01-22 @ 15:25)  Weight (kg): 36 (01-07-23 @ 23:00), 33.6 (12-23-22 @ 22:59), 35.2986 (12-23-22 @ 10:00)  BMI (kg/m2): 13.6 (01-07-23 @ 23:00), 12.7 (12-23-22 @ 22:59), 13.4 (12-23-22 @ 10:00)    [ ]PPSV2 < or = 30%  [ ]significant weight loss [x ]poor nutritional intake [ ]anasarca[ ]Artificial Nutrition    Other REFERRALS:  [ ]Hospice  [ ]Child Life  [ ]Social Work  [ x]Case management [ ]Holistic Therapy

## 2023-01-17 NOTE — PROGRESS NOTE ADULT - ASSESSMENT
_________________________________________________________________________________________  ========>>  M E D I C A L   A T T E N D I N G    F O L L O W  U P  N O T E  <<=========  -----------------------------------------------------------------------------------------------------    - Patient seen and examined by me earlier today.   - In summary,  JEF HOUSE is a 57y year old woman admitted with chest discomfort   - Patient today overall doing ok, comfortable, eating fairly > encouraged as patient appears very weak , encouraged PO intake       otherwise patient weak, dizzy with movement ! " trying to get stronger"      ==================>> REVIEW OF SYSTEM <<=================    GEN: no fever, no chills, chronic pain in back / flank   RESP: no SOB, no cough, no sputum  CVS: no chest pain now reported , no palpitations  GI: no abdominal pain, no nausea  : no dysuria, no frequency, chronic intermittent hematuria and vaginal bleeding   Neuro: no headache, + dizziness with movement   Derm : no itching, no rash    ==================>> PHYSICAL EXAM <<=================    GEN: A&O X 3 , NAD , comfortable, pleasant, calm , cachectic, in bed   HEENT: NCAT, PERRL, MMM, hearing intact, temporal wasting   Neck: supple , no JVD appreciated  CVS: S1S2 , regular , No M/R/G appreciated  PULM: CTA B/L,  no W/R/R appreciated  ABD.: soft. non tender, non distended  Extrem: intact pulses , + B/L Le 2+ edema , cachectic . + nephrostomy tube with clear yellow urine   PSYCH : flat affect, not anxious                             ( Note written / Date of service 01-17-23 )    ==================>> MEDICATIONS <<====================    enoxaparin Injectable 40 milliGRAM(s) SubCutaneous every 24 hours  midodrine. 5 milliGRAM(s) Oral three times a day  mirtazapine 15 milliGRAM(s) Oral at bedtime  morphine   Solution 5 milliGRAM(s) Oral every 4 hours  naloxone Injectable 0.4 milliGRAM(s) IV Push once  polyethylene glycol 3350 17 Gram(s) Oral daily  senna 2 Tablet(s) Oral at bedtime    MEDICATIONS  (PRN):  acetaminophen     Tablet .. 325 milliGRAM(s) Oral every 6 hours PRN Mild Pain (1 - 3)  aluminum hydroxide/magnesium hydroxide/simethicone Suspension 30 milliLiter(s) Oral every 4 hours PRN Dyspepsia  melatonin 3 milliGRAM(s) Oral at bedtime PRN Insomnia  morphine   Solution 5 milliGRAM(s) Oral every 4 hours PRN Moderate Pain (4 - 6)  morphine   Solution 10 milliGRAM(s) Oral every 4 hours PRN Severe Pain (7 - 10)  ondansetron Injectable 4 milliGRAM(s) IV Push every 8 hours PRN Nausea and/or Vomiting    ___________  Active diet:  Diet, Soft and Bite Sized:   Nikita(7 Gm Arginine/7 Gm Glut/1.2 Gm HMB     Qty per Day:  1  Liquid Protein Supplement     Qty per Day:  1  Supplement Feeding Modality:  Oral  Ensure Plus High Protein Cans or Servings Per Day:  2       Frequency:  Two Times a day  Ensure Plant-Based Cans or Servings Per Day:  1       Frequency:  Daily  ___________________    ==================>> VITAL SIGNS <<==================    Vital Signs Last 24 HrsT(C): 36.4 (01-17-23 @ 09:20)  T(F): 97.6 (01-17-23 @ 09:20), Max: 97.9 (01-17-23 @ 04:23)  HR: 74 (01-17-23 @ 09:20) (68 - 93)  BP: 98/66 (01-17-23 @ 09:20)  RR: 18 (01-17-23 @ 09:20) (17 - 18)  SpO2: 99% (01-17-23 @ 09:20) (95% - 100%)      CAPILLARY BLOOD GLUCOSE  POCT Blood Glucose.: 133 mg/dL (17 Jan 2023 12:51)  POCT Blood Glucose.: 91 mg/dL (17 Jan 2023 09:03)  POCT Blood Glucose.: 104 mg/dL (16 Jan 2023 17:16)     ==================>> LAB AND IMAGING <<==================                        8.6    15.50 )-----------( 99       ( 17 Jan 2023 14:19 )             28.2        01-17    147<H>  |  110<H>  |  32<H>  ----------------------------<  142<H>  3.6   |  23  |  0.90    Ca    9.1      17 Jan 2023 14:19  Mg     1.9     01-16    TPro  5.6<L>  /  Alb  2.2<L>  /  TBili  0.3  /  DBili  x   /  AST  32  /  ALT  29  /  AlkPhos  339<H>  01-16    WBC count:   15.50 <<== ,  12.30 <<== ,  14.89 <<==   Hemoglobin:   8.6 <<==,  6.1 <<==,  7.7 <<==  platelets:  99 <==, 95 <==, 93 <==    Creatinine:  0.90  <<==, 0.83  <<==, 0.77  <<==, <0.30  <<==, 0.70  <<==, 0.72  <<==  Sodium:   147  <==, 146  <==, 147  <==, 135  <==, 143  <==, 142  <==       AST:          32 <== , 31 <== , 28 <==      ALT:        29  <== , 23  <== , 23  <==      AP:        339  <=, 258  <=, 247  <=     Bili:        0.3  <=, 0.1  <=, 0.1  <=    ____________________________    M I C R O B I O L O G Y :    Culture - Urine (collected 09 Jan 2023 21:36)  Source: Clean Catch Clean Catch (Midstream)  Final Report (10 Fabrizio 2023 23:13):    No growth    ___________________________________________________________________________________  ===============>>  A S S E S S M E N T   A N D   P L A N <<===============  ------------------------------------------------------------------------------------------    · Assessment	  58 y/o woman with PMH Asthma, Anemia, Bladder cancer with metastases to the lungs, direct invasion to vagina, on chronic home O2 for comfort, Bradycardia / AF post PPM, Dyslipidemia, Hydronephrosis, R-nephrostomy tube, Liver cyst, Parathyroid tumor, Chronic pain on morphine sulfate at home ( not on hospice) presenting to the ED via EMS from oncologist office for worsening lethargy and intermittent central chest pain. Found to have profound hypercalcemia.     Problem/Plan - 1:  ·  Problem: Hypercalcemia of malignancy    improved > observe off iv fluids >> patient drinking PO fluids   -s/p calcitonin and Bisphosphonate administration    -heme/onc f/u  - trend CMP    supplement hypokalemia / hypomagnesemia as needed  keep K~4, Mag ~2    Problem/Plan - 2:  ·  Problem: Leukocytosis.  improved   ·  Plan: -chronic  however with reported productive cough with pleuritic chest pain and elevated PCT     post treatment empirically for possible PNA     ** Anemia of chronic disease  - PRBC as ordered    monitor post    Problem/Plan - 3:  ·  Problem: Bladder cancer.   ·  Plan: -no chemo since 05/2022  onc followup   plan for liver biopsy when patient stable / better.. ?      patient too weak and deconditioned, highly doubtful if ever would be able to get any treatments   patient deconditioned with baseline cachexia, severe protein calorie malnutrition, debility     palliative following: mainly for pain control    Problem/Plan - 4:  ·  Problem: Prophylactic measure.   ·  Plan: -Diet: Regular  -DVT ppx: Lovenox qd.    patient very weak, deconditioned, debilitated... not ready for DC home    will continue to discuss with patient and team re options..      patient may possibly agree to CARINA ?     --------------------------------------------  Case discussed with patient, RN, NP  Education given on findings and plan of care  ___________________________  H. NIEVES Ochoa.  Pager: [phone]

## 2023-01-17 NOTE — PROGRESS NOTE ADULT - PROBLEM SELECTOR PLAN 1
Current RX:  Morphine sol 3mg po q4 ATC   Morphine oral solution 5 mg q 4 hours prn moderate pain:  2 doses / 24 hours  Morphine oral solution 10 mg q 4 hours prn severe pain:  none used    will increase ATC morphine dosage to 5mg po q4h as pain is more severe this am.  Pt had been decrease to 3mg without control of symptoms.  we discussed to potential s/e of lethargy.  Pt in agreement with increasing dose to 5 and "she cannot tolerate this pain"      Educated patient on importance of taking the pain meds when pain is acute and to use the higher dose that is available.  Patient remains reluctant to start long acting meds, although at this junction, Fentanyl likely not able to absorb due to severe cachexia and refusing to take pills

## 2023-01-17 NOTE — CHART NOTE - NSCHARTNOTEFT_GEN_A_CORE
Based on patient's ongoing issues with deconditioning and generalized weakness secondary to patient's diagnosis of metastatic bladder cancer    Patient will require a semi electric hospital bed. This is necessary to achieve frequent changes in body position, elevation and head of bed needs to be elevated at least 30 degrees most of the time.  Pt is not able to make position changes without assistance.   Patient is at high risk for aspiration due to multiple medical problems.  Patient is bedbound and cannot make position changes. Pillows and wedges have been tried and failed.  Patient is at risk for skin breakdown.    Ross Esqueda  NP-C [id number]

## 2023-01-17 NOTE — PROGRESS NOTE ADULT - SUBJECTIVE AND OBJECTIVE BOX
CARDIOLOGY     PROGRESS  NOTE   ________________________________________________    CHIEF COMPLAINT:Patient is a 57y old  Female who presents with a chief complaint of chest pain (16 Jan 2023 14:32)  no complain  	  REVIEW OF SYSTEMS:  CONSTITUTIONAL: No fever, weight loss, or fatigue  EYES: No eye pain, visual disturbances, or discharge  ENT:  No difficulty hearing, tinnitus, vertigo; No sinus or throat pain  NECK: No pain or stiffness  RESPIRATORY: No cough, wheezing, chills or hemoptysis; + Shortness of Breath  CARDIOVASCULAR: No chest pain, palpitations, passing out, dizziness, or leg swelling  GASTROINTESTINAL: No abdominal or epigastric pain. No nausea, vomiting, or hematemesis; No diarrhea or constipation. No melena or hematochezia.  GENITOURINARY: No dysuria, frequency, hematuria, or incontinence  NEUROLOGICAL: No headaches, memory loss, loss of strength, numbness, or tremors  SKIN: No itching, burning, rashes, or lesions   LYMPH Nodes: No enlarged glands  ENDOCRINE: No heat or cold intolerance; No hair loss  MUSCULOSKELETAL: No joint pain or swelling; No muscle, back, or extremity pain  PSYCHIATRIC: No depression, anxiety, mood swings, or difficulty sleeping  HEME/LYMPH: No easy bruising, or bleeding gums  ALLERGY AND IMMUNOLOGIC: No hives or eczema	    [ ] All others negative	  [ ] Unable to obtain    PHYSICAL EXAM:  T(C): 36.6 (01-17-23 @ 04:23), Max: 37.8 (01-16-23 @ 12:18)  HR: 81 (01-17-23 @ 04:23) (68 - 93)  BP: 95/62 (01-17-23 @ 04:23) (87/52 - 103/67)  RR: 18 (01-17-23 @ 04:23) (17 - 18)  SpO2: 99% (01-17-23 @ 04:23) (95% - 100%)  Wt(kg): --  I&O's Summary    16 Jan 2023 07:01  -  17 Jan 2023 07:00  --------------------------------------------------------  IN: 510 mL / OUT: 425 mL / NET: 85 mL        Appearance: Normal	  HEENT:   Normal oral mucosa, PERRL, EOMI	  Lymphatic: No lymphadenopathy  Cardiovascular: Normal S1 S2, No JVD, +murmurs, No edema  Respiratory: Lungs clear to auscultation	  Psychiatry: A & O x 3, Mood & affect appropriate  Gastrointestinal:  Soft, Non-tender, + BS	  Skin: No rashes, No ecchymoses, No cyanosis	  Neurologic: Non-focal  Extremities: Normal range of motion, No clubbing, cyanosis or edema  Vascular: Peripheral pulses palpable 2+ bilaterally    MEDICATIONS  (STANDING):  enoxaparin Injectable 40 milliGRAM(s) SubCutaneous every 24 hours  furosemide   Injectable 20 milliGRAM(s) IV Push two times a day  midodrine. 5 milliGRAM(s) Oral three times a day  mirtazapine 15 milliGRAM(s) Oral at bedtime  morphine   Solution 3 milliGRAM(s) Oral four times a day  naloxone Injectable 0.4 milliGRAM(s) IV Push once  polyethylene glycol 3350 17 Gram(s) Oral daily  senna 2 Tablet(s) Oral at bedtime      TELEMETRY: 	    ECG:  	  RADIOLOGY:  OTHER: 	  	  LABS:	 	    CARDIAC MARKERS:                                6.1    12.30 )-----------( 95       ( 16 Jan 2023 16:52 )             22.3     01-16    146<H>  |  110<H>  |  32<H>  ----------------------------<  92  3.1<L>   |  26  |  0.83    Ca    9.3      16 Jan 2023 16:52  Mg     1.9     01-16    TPro  5.6<L>  /  Alb  2.2<L>  /  TBili  0.3  /  DBili  x   /  AST  32  /  ALT  29  /  AlkPhos  339<H>  01-16    proBNP: Serum Pro-Brain Natriuretic Peptide: 510 pg/mL (01-06 @ 19:43)  Serum Pro-Brain Natriuretic Peptide: 202 pg/mL (12-24 @ 05:41)    Lipid Profile:   HgA1c:   TSH:     Pain scores reviewed  Lower dose of PRN pain medication started now QID  Discussed with  day and night nurse during their rounds  Pt reports significant lethargy and sleepiness with the previous dose, this is the likely reason for deescalation.  She is reporting significant distress, physical and emotional.   The patient is frustrated by the process of being hospitalized.   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 Patient reported the pain is sharp well localized and occasionally pleuritic. Has chronic cough with bloody or brown sputum. Reports feeling dehydrated. Has not had chemo since 5/2022. Patient denied abdominal pain, N/V/D, fever, urinary symptoms, rash  pt with metastatic bladder ca / onc eval  s/p ppm , sss  chest pain doubt cardiac ?metastatic ca bony mets  dvt prophylaxis  severe protein deficiency, nutrition eval   hydration  psych eval start Remeron 15 mg po qhs  pain management  PE has been ruled out several admissions  dvt prophylaxis  nutrition consult severe protein deficiency appreciated  check labs, hypercalcemia, increase fluid,  endocrine eval noted  ?abx check urin  oncology eval appreciated, awaiting ct chest abdomen and pelvis  physical therapy  replete K, keep K>4  doubt pt is a candidate for chemo being so malnourished   replete lytes  ppm was recently checked  hypotension decrease morphine dose may need to add midodrine  may decrease morphine to three times per day, bp better controlled  ?hospice inpatient  decrease hgb transfuse keep hgb>8

## 2023-01-18 NOTE — PROGRESS NOTE ADULT - PROBLEM SELECTOR PLAN 1
Current RX:  Morphine sol 5mg po q4 ATC   Morphine oral solution 5 mg q 4 hours prn moderate pain:  0 doses / 24 hours  Morphine oral solution 10 mg q 4 hours prn severe pain:  none used    Pt reports that pain is well controlled with increased dose.  Although she states she is tried she feels better.  Educated patient on importance of taking the pain meds when pain is acute and to use the higher dose that is available.  Patient remains reluctant to start long acting meds, although at this junction, Fentanyl likely not able to absorb due to severe cachexia and refusing to take pills

## 2023-01-18 NOTE — PROGRESS NOTE ADULT - SUBJECTIVE AND OBJECTIVE BOX
SUBJECTIVE AND OBJECTIVE:  Sitting up in bed, tired.  Frail appearing .  Indication for Geriatrics and Palliative Care Services/INTERVAL HPI:  pain /symptom management     OVERNIGHT EVENTS: Pt on Morphine 5mg morphine solution ATC. pt did not use prn Morphine solution 5mg prn for breakthrough pain.      DNR on chart:  Allergies    No Known Allergies    Intolerances    MEDICATIONS  (STANDING):  enoxaparin Injectable 40 milliGRAM(s) SubCutaneous every 24 hours  midodrine. 5 milliGRAM(s) Oral three times a day  mirtazapine 15 milliGRAM(s) Oral at bedtime  morphine   Solution 5 milliGRAM(s) Oral every 4 hours  naloxone Injectable 0.4 milliGRAM(s) IV Push once  polyethylene glycol 3350 17 Gram(s) Oral daily  senna 2 Tablet(s) Oral at bedtime    MEDICATIONS  (PRN):  acetaminophen     Tablet .. 325 milliGRAM(s) Oral every 6 hours PRN Mild Pain (1 - 3)  aluminum hydroxide/magnesium hydroxide/simethicone Suspension 30 milliLiter(s) Oral every 4 hours PRN Dyspepsia  melatonin 3 milliGRAM(s) Oral at bedtime PRN Insomnia  morphine   Solution 5 milliGRAM(s) Oral every 4 hours PRN Moderate Pain (4 - 6)  morphine   Solution 10 milliGRAM(s) Oral every 4 hours PRN Severe Pain (7 - 10)  ondansetron Injectable 4 milliGRAM(s) IV Push every 8 hours PRN Nausea and/or Vomiting        ITEMS UNCHECKED ARE NOT PRESENT    PRESENT SYMPTOMS: [ ]Unable to self-report - see [ ] CPOT [ ] PAINADS [ ] RDOS  Source if other than patient:  [ ]Family   [ ]Team     Pain:  [x ]yes [ ]no  improved with Morphine ATC  QOL impact -   Location -      lower abdomen    perineum          Aggravating factors -  movement / positional   Quality - dull achy   Radiation - none  Timing- constant  Severity (0-10 scale):  7/10 at time of assessment   Minimal acceptable level (0-10 scale):     CPOT:    https://www.sccm.org/getattachment/mzz75h31-3t8u-9s0a-9s8e-7407o3106l8t/Critical-Care-Pain-Observation-Tool-(CPOT)    PAIN AD Score:	  http://geriatrictoolkit.[email or web address]/painad.pdf (Ctrl + left click to view)    Dyspnea:                           [ ]Mild [ ]Moderate [ ]Severe    RDOS:  0 to 2  minimal or no respiratory distress   3  mild distress  4 to 6 moderate distress  >7 severe distress  https://homecareinformation.net/handouts/hen/Respiratory_Distress_Observation_Scale.pdf (Ctrl +  left click to view)     Anxiety:                             [ ]Mild [ x]Moderate [ ]Severe  Fatigue:                             [ ]Mild [x ]Moderate [ ]Severe  Nausea:                            [ ]Mild [ ]Moderate [ ]Severe  Loss of appetite:               [ ]Mild [x ]Moderate [ x]Severe  Constipation:                    [ ]Mild [ ]Moderate [ ]Severe    PCSSQ[Palliative Care Spiritual Screening Question]   Severity (0-10):  3  Score of 4 or > indicate consideration of Chaplaincy referral.  Chaplaincy Referral: [ x] yes [ ] refused [ ] following  deferred     Caregiver Shelby? : [ ] yes [ ] no  Social work referral [ ] Patient & Family Centered Care Referral [ ]   UNKNOWN     Anticipatory Grief present?:  [ ] yes [ ] no  Social work referral [ ] Patient & Family Centered Care Referral [ ]      Other Symptoms:  [x ]All other review of systems negative     Palliative Performance Status Version 2:        30 %      http://npcrc.org/files/news/palliative_performance_scale_ppsv2.pdf  PHYSICAL EXAM:    Vital Signs Last 24 Hrs  Vital Signs Last 24 Hrs  T(C): 36.5 (18 Jan 2023 04:35), Max: 36.5 (17 Jan 2023 18:40)  T(F): 97.7 (18 Jan 2023 04:35), Max: 97.7 (17 Jan 2023 18:40)  HR: 88 (18 Jan 2023 10:00) (73 - 89)  BP: 97/67 (18 Jan 2023 10:00) (86/58 - 97/67)  BP(mean): --  RR: 18 (18 Jan 2023 10:00) (18 - 18)  SpO2: 99% (18 Jan 2023 10:00) (98% - 99%)    Parameters below as of 18 Jan 2023 10:00  Patient On (Oxygen Delivery Method): nasal cannula  O2 Flow (L/min): 2         GENERAL: [x ]Cachexia    [ x]Alert  [ x]Oriented x 4  [ ]Lethargic  [ ]Unarousable  [x ]Verbal  [ ]Non-Verbal  Behavioral:   [ x]Anxiety  [ ]Delirium [ ]Agitation [ ]Other  HEENT:  [ ]Normal   [x ]Dry mouth   [ ]ET Tube/Trach  [ ]Oral lesions  PULMONARY:   [ x]Clear [ ]Tachypnea  [ ]Audible excessive secretions   [ ]Rhonchi        [ ]Right [ ]Left [ ]Bilateral  [ ]Crackles        [ ]Right [ ]Left [ ]Bilateral  [ ]Wheezing     [ ]Right [ ]Left [ ]Bilateral  [ ]Diminished BS [ ] Right [ ]Left [ ]Bilateral  CARDIOVASCULAR:    [x ]Regular [ ]Irregular [ ]Tachy  [ ]Bradley [ ]Murmur [ ]Other  GASTROINTESTINAL:  [ x]Softly distended   [ ]Distended   [x ]+BS  [ ]Non tender [ ]Tender  [ ]Other [ ]PEG [ ]OGT/ NGT   Last BM:   GENITOURINARY: spotting with BRB from perineum on tissues  [x ]Normal [ ]Incontinent   [ ]Oliguria/Anuria   [ ]Lloyd  MUSCULOSKELETAL:   [ ]Normal   [x ]Weakness  [ ]Bed/Wheelchair bound [ ]Edema  NEUROLOGIC:   [ x]No focal deficits  [ ] Cognitive impairment  [ ] Dysphagia [ ]Dysarthria [ ] Paresis [ ]Other   SKIN:   [ ]Normal  [ ]Rash  [ ]Other  [ x]Pressure ulcer(s) Stage 2 sacrum [ ]y [ ]n present on admission    CRITICAL CARE:  [ ]Shock Present  [ ]Septic [ ]Cardiogenic [ ]Neurologic [ ]Hypovolemic  [ ]Vasopressors [ ]Inotropes  [ ]Respiratory failure present [ ]Mechanical Ventilation [ ]Non-invasive ventilatory support [ ]High-Flow   [ ]Acute  [ ]Chronic [ ]Hypoxic  [ ]Hypercarbic [ ]Other  [ ]Other organ failure     LABS:                              8.6    15.50 )-----------( 99       ( 17 Jan 2023 14:19 )             28.2   01-17    147<H>  |  110<H>  |  32<H>  ----------------------------<  142<H>  3.6   |  23  |  0.90    Ca    9.1      17 Jan 2023 14:19  Mg     1.9     01-16    TPro  5.6<L>  /  Alb  2.2<L>  /  TBili  0.3  /  DBili  x   /  AST  32  /  ALT  29  /  AlkPhos  339<H>  01-16        RADIOLOGY & ADDITIONAL STUDIES:      Protein Calorie Malnutrition Present: [ ]mild [ ]moderate [ ]severe [ ]underweight [ ]morbid obesity  Consult Type: Non Face-to-Face.     Time-Base Billing for Non-Face-to-Face consult (Minutes) 5-10.    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 Recently d/c from  on Dec 21th for SOB. Back in  [facility] on 24th for blood transfusion now presenting to the ED via EMS from outpt office for worsening lethargy and intermittent central chest pain. IR consulted for liver biopsy.     Allergies: NKDA  Medications (Abx/Cardiac/Anticoagulation/Blood Products)  azithromycin  IVPB: 255 mL/Hr IV Intermittent (01-08 @ 21:48)  cefTRIAXone   IVPB: 100 mL/Hr IV Intermittent (01-08 @ 04:34)  cefTRIAXone   IVPB: 100 mL/Hr IV Intermittent (01-09 @ 05:31)  enoxaparin Injectable: 40 milliGRAM(s) SubCutaneous (01-09 @ 13:22)    Data:  T(C): 36.3  HR: 82  BP: 106/65  RR: 18  SpO2: 100%    -WBC 15.88 / HgB 8.7 / Hct 28.9 / Plt 232  -Na 142 / Cl 105 / BUN 34 / Glucose 76  -K 3.2 / CO2 20 / Cr 0.85  -ALT -- / Alk Phos -- / T.Bili --  -INR 1.27 / PTT 30.7    Radiology: pending CT    Assessment/Plan: 57F PMH Asthma, Anemia, Bladder cancer with  metastases to the lungs(patient of Dr Kan) on chronic home O2, Bradycardia, Hypertension, Dyslipidemia, Hydronephrosis, R-nephrostomy tube, Liver cyst, Parathyroid tumor, Chronic pain on morphine 2.5 (last took this morning) and 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 Recently d/c from  on Dec 21th for SOB. Back in  ER on 24th for blood transfusion now presenting to the ED via EMS from outpt office for worsening lethargy and intermittent central chest pain. IR consulted for liver biopsy.     **consult in progress**  < from: CT Abdomen and Pelvis No Cont (10.17.22 @ 17:54) >    ACC: [id number] EXAM:  CT CHEST                        ACC: [id number] EXAM:  CT ABDOMEN AND PELVIS                          PROCEDURE DATE:  10/17/2022          INTERPRETATION:  CLINICAL INFORMATION: Left hip and left back pain.   Concern for metastasis or stones.    COMPARISON: CT chest, abdomen and pelvis from 9/20/2022.    CONTRAST/COMPLICATIONS:  IV Contrast: None.  Oral Contrast: None.  Complications: None reported.    PROCEDURE:  CT of the Chest, Abdomen and Pelvis was performed.  Sagittal and coronal reformats were performed.    FINDINGS:  CHEST:  LUNGS AND LARGE AIRWAYS: Redemonstration of innumerable bilateral   pulmonary nodules consistent with metastases. A few of the largest   pulmonary nodules are mildly increased in size. For example, left lower   lobe nodule measures 3.3 x 2.3 cm (2:116), previously 2.8 x 2.0 cm on   9/20/2022 . A right lower lobe nodule measures 2.4 x 2.2 cm (2:103),   previously 2.2 x 2.1 cm.  PLEURA: No pleural effusion.  VESSELS: Within normal limits.  HEART: Heart size is normal. Small pericardial effusion.  MEDIASTINUM AND FLORENTIN: No lymphadenopathy.  CHEST WALL AND LOWER NECK: Left chest wall pacemaker with leads in the   right atrium and ventricle.    ABDOMEN AND PELVIS:  LIVER: Interval increase in size of hepatic metastases. For reference,   segment 7 mass measures 4.0 x 2.3 cm (2:148), previously 3.3 x 2.2 cm.  BILE DUCTS: Normal caliber.  GALLBLADDER: Within normal limits.  SPLEEN: Within normal limits.  PANCREAS: Within normal limits.  ADRENALS: Within normal limits.  KIDNEYS/URETERS: Right percutaneous nephrostomy catheter; no right   hydronephrosis. 0.4 cm non obstructing stone in the right lower pole. Left   kidney is enlarged with severe hydroureteronephrosis and markedly dilated   calyces, unchanged. There are nonobstructive stones of the left kidney.    BLADDER: Large bladder mass is without significant change.  REPRODUCTIVE ORGANS: Uterus and adnexa within normal limits.    BOWEL: No bowel obstruction. Appendix is normal.  PERITONEUM: No ascites.  VESSELS: Within normal limits.  RETROPERITONEUM/LYMPH NODES: Retroperitoneal and pelvic lymphadenopathy   is unchanged.  ABDOMINAL WALL: Within normal limits.  BONES: No evidence of osseous metastatic disease.    IMPRESSION:  1. Large bladder mass is without significant change. Severe chronic   hydronephrosis of the left kidney is unchanged. Right nephrostomy tube in   place; no right hydronephrosis.  2.  Numerous bilateral pulmonary metastases; mild interval increase in   size of a few of the largest pulmonary metastases compared to 9/20/2022.  3.  Progression of liver metastases.  4.  No evidence of osseous metastatic disease.        --- End of Report ---    < end of copied text >  https://www.andeal.org/vault/2440/web/files/ONC/Table_Clinical%20Characteristics%20to%20Document%20Malnutrition-White%20JV%20et%20al%202012.pdf    Height (cm): 162.6 (01-07-23 @ 23:00), 162.6 (12-23-22 @ 22:59), 162.6 (12-01-22 @ 15:25)  Weight (kg): 36 (01-07-23 @ 23:00), 33.6 (12-23-22 @ 22:59), 35.2986 (12-23-22 @ 10:00)  BMI (kg/m2): 13.6 (01-07-23 @ 23:00), 12.7 (12-23-22 @ 22:59), 13.4 (12-23-22 @ 10:00)    [ ]PPSV2 < or = 30%  [ ]significant weight loss [x ]poor nutritional intake [ ]anasarca[ ]Artificial Nutrition    Other REFERRALS:  [ ]Hospice  [ ]Child Life  [ ]Social Work  [ x]Case management [ ]Holistic Therapy

## 2023-01-18 NOTE — PROGRESS NOTE ADULT - ASSESSMENT
_________________________________________________________________________________________  ========>>  M E D I C A L   A T T E N D I N G    F O L L O W  U P  N O T E  <<=========  -----------------------------------------------------------------------------------------------------    - Patient seen and examined by me earlier today.   - In summary,  JEF HOUSE is a 57y year old woman admitted with chest discomfort   - Patient today overall doing ok, comfortable, eating fairly > encouraged as patient appears very weak , encouraged PO intake       otherwise patient weak, dizzy with movement ! " trying to get stronger"      ==================>> REVIEW OF SYSTEM <<=================    GEN: no fever, no chills, chronic pain in back / flank   RESP: no SOB, no cough, no sputum  CVS: no chest pain now reported , no palpitations  GI: no abdominal pain, no nausea  : no dysuria, no frequency, chronic intermittent hematuria and vaginal bleeding   Neuro: no headache, + dizziness with movement   Derm : no itching, no rash    ==================>> PHYSICAL EXAM <<=================    GEN: A&O X 3 , NAD , comfortable, pleasant, calm , cachectic, in bed   HEENT: NCAT, PERRL, MMM, hearing intact, temporal wasting   Neck: supple , no JVD appreciated  CVS: S1S2 , regular , No M/R/G appreciated  PULM: CTA B/L,  no W/R/R appreciated  ABD.: soft. non tender, non distended  Extrem: intact pulses , + B/L Le 2+ edema , cachectic . + nephrostomy tube with clear yellow urine   PSYCH : flat affect, not anxious                             ( Note written / Date of service 01-17-23 )    ==================>> MEDICATIONS <<====================    enoxaparin Injectable 40 milliGRAM(s) SubCutaneous every 24 hours  midodrine. 5 milliGRAM(s) Oral three times a day  mirtazapine 15 milliGRAM(s) Oral at bedtime  morphine   Solution 5 milliGRAM(s) Oral every 4 hours  naloxone Injectable 0.4 milliGRAM(s) IV Push once  polyethylene glycol 3350 17 Gram(s) Oral daily  senna 2 Tablet(s) Oral at bedtime    MEDICATIONS  (PRN):  acetaminophen     Tablet .. 325 milliGRAM(s) Oral every 6 hours PRN Mild Pain (1 - 3)  aluminum hydroxide/magnesium hydroxide/simethicone Suspension 30 milliLiter(s) Oral every 4 hours PRN Dyspepsia  melatonin 3 milliGRAM(s) Oral at bedtime PRN Insomnia  morphine   Solution 5 milliGRAM(s) Oral every 4 hours PRN Moderate Pain (4 - 6)  morphine   Solution 10 milliGRAM(s) Oral every 4 hours PRN Severe Pain (7 - 10)  ondansetron Injectable 4 milliGRAM(s) IV Push every 8 hours PRN Nausea and/or Vomiting    ___________  Active diet:  Diet, Soft and Bite Sized:   Nikita(7 Gm Arginine/7 Gm Glut/1.2 Gm HMB     Qty per Day:  1  Liquid Protein Supplement     Qty per Day:  1  Supplement Feeding Modality:  Oral  Ensure Plus High Protein Cans or Servings Per Day:  2       Frequency:  Two Times a day  Ensure Plant-Based Cans or Servings Per Day:  1       Frequency:  Daily  ___________________    ==================>> VITAL SIGNS <<==================    Vital Signs Last 24 HrsT(C): 36.4 (01-17-23 @ 09:20)  T(F): 97.6 (01-17-23 @ 09:20), Max: 97.9 (01-17-23 @ 04:23)  HR: 74 (01-17-23 @ 09:20) (68 - 93)  BP: 98/66 (01-17-23 @ 09:20)  RR: 18 (01-17-23 @ 09:20) (17 - 18)  SpO2: 99% (01-17-23 @ 09:20) (95% - 100%)      CAPILLARY BLOOD GLUCOSE  POCT Blood Glucose.: 133 mg/dL (17 Jan 2023 12:51)  POCT Blood Glucose.: 91 mg/dL (17 Jan 2023 09:03)  POCT Blood Glucose.: 104 mg/dL (16 Jan 2023 17:16)     ==================>> LAB AND IMAGING <<==================                        8.6    15.50 )-----------( 99       ( 17 Jan 2023 14:19 )             28.2        01-17    147<H>  |  110<H>  |  32<H>  ----------------------------<  142<H>  3.6   |  23  |  0.90    Ca    9.1      17 Jan 2023 14:19  Mg     1.9     01-16    TPro  5.6<L>  /  Alb  2.2<L>  /  TBili  0.3  /  DBili  x   /  AST  32  /  ALT  29  /  AlkPhos  339<H>  01-16    WBC count:   15.50 <<== ,  12.30 <<== ,  14.89 <<==   Hemoglobin:   8.6 <<==,  6.1 <<==,  7.7 <<==  platelets:  99 <==, 95 <==, 93 <==    Creatinine:  0.90  <<==, 0.83  <<==, 0.77  <<==, <0.30  <<==, 0.70  <<==, 0.72  <<==  Sodium:   147  <==, 146  <==, 147  <==, 135  <==, 143  <==, 142  <==       AST:          32 <== , 31 <== , 28 <==      ALT:        29  <== , 23  <== , 23  <==      AP:        339  <=, 258  <=, 247  <=     Bili:        0.3  <=, 0.1  <=, 0.1  <=    ____________________________    M I C R O B I O L O G Y :    Culture - Urine (collected 09 Jan 2023 21:36)  Source: Clean Catch Clean Catch (Midstream)  Final Report (10 Fabrizio 2023 23:13):    No growth    ___________________________________________________________________________________  ===============>>  A S S E S S M E N T   A N D   P L A N <<===============  ------------------------------------------------------------------------------------------    · Assessment	  56 y/o woman with PMH Asthma, Anemia, Bladder cancer with metastases to the lungs, direct invasion to vagina, on chronic home O2 for comfort, Bradycardia / AF post PPM, Dyslipidemia, Hydronephrosis, R-nephrostomy tube, Liver cyst, Parathyroid tumor, Chronic pain on morphine sulfate at home ( not on hospice) presenting to the ED via EMS from oncologist office for worsening lethargy and intermittent central chest pain. Found to have profound hypercalcemia.     Problem/Plan - 1:  ·  Problem: Hypercalcemia of malignancy    improved > observe off iv fluids >> patient drinking PO fluids   -s/p calcitonin and Bisphosphonate administration    -heme/onc f/u  - trend CMP    supplement hypokalemia / hypomagnesemia as needed  keep K~4, Mag ~2    Problem/Plan - 2:  ·  Problem: Leukocytosis.  improved   ·  Plan: -chronic  however with reported productive cough with pleuritic chest pain and elevated PCT     post treatment empirically for possible PNA     ** Anemia of chronic disease  - PRBC as ordered    monitor post    Problem/Plan - 3:  ·  Problem: Bladder cancer.   ·  Plan: -no chemo since 05/2022  onc followup   plan for liver biopsy when patient stable / better.. ?      patient too weak and deconditioned, highly doubtful if ever would be able to get any treatments   patient deconditioned with baseline cachexia, severe protein calorie malnutrition, debility     palliative following: mainly for pain control    Problem/Plan - 4:  ·  Problem: Prophylactic measure.   ·  Plan: -Diet: Regular  -DVT ppx: Lovenox qd.    patient very weak, deconditioned, debilitated... not ready for DC home    will continue to discuss with patient and team re options..      patient may possibly agree to [facility] ?     --------------------------------------------  Case discussed with patient, RN, NP  Education given on findings and plan of care  ___________________________  H. NIEVES Ochoa.  Pager: [phone]       _________________________________________________________________________________________  ========>>  M E D I C A L   A T T E N D I N G    F O L L O W  U P  N O T E  <<=========  -----------------------------------------------------------------------------------------------------    - Patient seen and examined by me earlier today.   - In summary,  JEF HOUSE is a 57y year old woman admitted with chest discomfort   - Patient today overall doing fairly, comfortable, eating fairly > encouraged PO intake       otherwise patient weak, dizzy with movement ! " trying to get stronger"      ==================>> REVIEW OF SYSTEM <<=================    GEN: no fever, no chills, chronic pain in back / flank   RESP: no SOB, no cough, no sputum  CVS: no chest pain now reported , no palpitations  GI: no abdominal pain, no nausea  : no dysuria, no frequency, chronic intermittent hematuria and vaginal bleeding   Neuro: no headache, + dizziness with movement   Derm : no itching, no rash    ==================>> PHYSICAL EXAM <<=================    GEN: A&O X 3 , NAD , comfortable, pleasant, calm , cachectic, in bed   HEENT: NCAT, PERRL, MMM, hearing intact, temporal wasting   Neck: supple , no JVD appreciated  CVS: S1S2 , regular , No M/R/G appreciated  PULM: CTA B/L,  no W/R/R appreciated  ABD.: soft. non tender, non distended  Extrem: intact pulses , + B/L Le 2+ edema , cachectic . + nephrostomy tube with clear yellow urine   PSYCH : flat affect, not anxious                                  ( note written / Date of service   01-18-23 )    ==================>> MEDICATIONS <<====================    enoxaparin Injectable 40 milliGRAM(s) SubCutaneous every 24 hours  midodrine. 5 milliGRAM(s) Oral three times a day  mirtazapine 15 milliGRAM(s) Oral at bedtime  morphine   Solution 5 milliGRAM(s) Oral every 4 hours  naloxone Injectable 0.4 milliGRAM(s) IV Push once  polyethylene glycol 3350 17 Gram(s) Oral daily  senna 2 Tablet(s) Oral at bedtime    MEDICATIONS  (PRN):  acetaminophen     Tablet .. 325 milliGRAM(s) Oral every 6 hours PRN Mild Pain (1 - 3)  aluminum hydroxide/magnesium hydroxide/simethicone Suspension 30 milliLiter(s) Oral every 4 hours PRN Dyspepsia  melatonin 3 milliGRAM(s) Oral at bedtime PRN Insomnia  morphine   Solution 5 milliGRAM(s) Oral every 4 hours PRN Moderate Pain (4 - 6)  morphine   Solution 10 milliGRAM(s) Oral every 4 hours PRN Severe Pain (7 - 10)  ondansetron Injectable 4 milliGRAM(s) IV Push every 8 hours PRN Nausea and/or Vomiting    ___________  Active diet:  Diet, Soft and Bite Sized:   Nikita(7 Gm Arginine/7 Gm Glut/1.2 Gm HMB     Qty per Day:  1  Liquid Protein Supplement     Qty per Day:  1  Supplement Feeding Modality:  Oral  Ensure Plus High Protein Cans or Servings Per Day:  2       Frequency:  Two Times a day  Ensure Plant-Based Cans or Servings Per Day:  1       Frequency:  Daily  ___________________    ==================>> VITAL SIGNS <<==================     Vital Signs Last 24 HrsT(C): 36.7 (01-18-23 @ 17:11)  T(F): 98 (01-18-23 @ 17:11), Max: 98 (01-18-23 @ 17:11)  HR: 88 (01-18-23 @ 17:11) (73 - 88)  BP: 99/65 (01-18-23 @ 17:11)  RR: 18 (01-18-23 @ 17:11) (18 - 18)  SpO2: 100% (01-18-23 @ 17:11) (98% - 100%)      CAPILLARY BLOOD GLUCOSE    POCT Blood Glucose.: 86 mg/dL (18 Jan 2023 18:14)  POCT Blood Glucose.: 117 mg/dL (18 Jan 2023 13:26)  POCT Blood Glucose.: 93 mg/dL (18 Jan 2023 08:29)  POCT Blood Glucose.: 178 mg/dL (17 Jan 2023 21:32)     ==================>> LAB AND IMAGING <<==================                        8.6    15.50 )-----------( 99       ( 17 Jan 2023 14:19 )             28.2        01-17    147<H>  |  110<H>  |  32<H>  ----------------------------<  142<H>  3.6   |  23  |  0.90    Ca    9.1      17 Jan 2023 14:19        ___________________________________________________________________________________  ===============>>  A S S E S S M E N T   A N D   P L A N <<===============  ------------------------------------------------------------------------------------------    · Assessment	  58 y/o woman with PMH Asthma, Anemia, Bladder cancer with metastases to the lungs, direct invasion to vagina, on chronic home O2 for comfort, Bradycardia / AF post PPM, Dyslipidemia, Hydronephrosis, R-nephrostomy tube, Liver cyst, Parathyroid tumor, Chronic pain on morphine sulfate at home ( not on hospice) presenting to the ED via EMS from oncologist office for worsening lethargy and intermittent central chest pain. Found to have profound hypercalcemia.     Problem/Plan - 1:  ·  Problem: Hypercalcemia of malignancy    improved > observe off iv fluids >> patient drinking PO fluids   -s/p calcitonin and Bisphosphonate administration    - trend CMP    supplement hypokalemia / hypomagnesemia as needed  keep K~4, Mag ~2    Problem/Plan - 2:  ·  Problem: Leukocytosis.  improved   ·  Plan: -chronic  however with reported productive cough with pleuritic chest pain and elevated PCT     post treatment empirically for possible PNA     ** Anemia of chronic disease  - PRBC as ordered    monitor post    Problem/Plan - 3:  ·  Problem: Bladder cancer.   ·  Plan: -no chemo since 05/2022  onc followup   plan for liver biopsy when patient stable / better.. ?      patient too weak and deconditioned, highly doubtful if ever would be able to get any treatments   patient deconditioned with baseline cachexia, severe protein calorie malnutrition, debility     palliative following: mainly for pain control    Problem/Plan - 4:  ·  Problem: Prophylactic measure.   ·  Plan: -Diet: Regular  -DVT ppx: Lovenox qd.    patient very weak, deconditioned, debilitated... not ready for DC home    will continue to discuss with patient and team re options..  as above    Pt may be willing to consider to go to rehab     --------------------------------------------  Case discussed with patient, RN, NP, pt's sister at bedside   Education given on findings and plan of care  ___________________________  H. NIEVES Ochoa.  Pager: [phone]

## 2023-01-18 NOTE — PROGRESS NOTE ADULT - SUBJECTIVE AND OBJECTIVE BOX
CARDIOLOGY     PROGRESS  NOTE   ________________________________________________    CHIEF COMPLAINT:Patient is a 57y old  Female who presents with a chief complaint of chest pain (17 Jan 2023 15:11)  no complain, still with poor po intake  	  REVIEW OF SYSTEMS:  CONSTITUTIONAL: No fever, weight loss, or fatigue  EYES: No eye pain, visual disturbances, or discharge  ENT:  No difficulty hearing, tinnitus, vertigo; No sinus or throat pain  NECK: No pain or stiffness  RESPIRATORY: No cough, wheezing, chills or hemoptysis; + Shortness of Breath  CARDIOVASCULAR: No chest pain, palpitations, passing out, dizziness, or leg swelling  GASTROINTESTINAL: No abdominal or epigastric pain. No nausea, vomiting, or hematemesis; No diarrhea or constipation. No melena or hematochezia.  GENITOURINARY: No dysuria, frequency, hematuria, or incontinence  NEUROLOGICAL: No headaches, memory loss, loss of strength, numbness, or tremors  SKIN: No itching, burning, rashes, or lesions   LYMPH Nodes: No enlarged glands  ENDOCRINE: No heat or cold intolerance; No hair loss  MUSCULOSKELETAL: No joint pain or swelling; No muscle, back, or extremity pain  PSYCHIATRIC: No depression, anxiety, mood swings, or difficulty sleeping  HEME/LYMPH: No easy bruising, or bleeding gums  ALLERGY AND IMMUNOLOGIC: No hives or eczema	    [ ] All others negative	  [ ] Unable to obtain    PHYSICAL EXAM:  T(C): 36.5 (01-18-23 @ 04:35), Max: 36.5 (01-17-23 @ 18:40)  HR: 88 (01-18-23 @ 04:35) (73 - 89)  BP: 86/58 (01-18-23 @ 04:35) (86/58 - 99/62)  RR: 18 (01-18-23 @ 04:35) (18 - 18)  SpO2: 98% (01-18-23 @ 04:35) (98% - 99%)  Wt(kg): --  I&O's Summary    16 Jan 2023 07:01  -  17 Jan 2023 07:00  --------------------------------------------------------  IN: 510 mL / OUT: 425 mL / NET: 85 mL    17 Jan 2023 07:01  -  18 Jan 2023 06:28  --------------------------------------------------------  IN: 520 mL / OUT: 600 mL / NET: -80 mL        Appearance: Cachectic  HEENT:   Normal oral mucosa, PERRL, EOMI	  Lymphatic: No lymphadenopathy  Cardiovascular: Normal S1 S2, No JVD, + murmurs, No edema  Respiratory: Lungs clear to auscultation	  Gastrointestinal:  Soft, Non-tender, + BS	  Skin: No rashes, No ecchymoses, No cyanosis	  Neurologic: Non-focal  Extremities: Normal range of motion, No clubbing, cyanosis or edema  Vascular: Peripheral pulses palpable 2+ bilaterally    MEDICATIONS  (STANDING):  enoxaparin Injectable 40 milliGRAM(s) SubCutaneous every 24 hours  midodrine. 5 milliGRAM(s) Oral three times a day  mirtazapine 15 milliGRAM(s) Oral at bedtime  morphine   Solution 5 milliGRAM(s) Oral every 4 hours  naloxone Injectable 0.4 milliGRAM(s) IV Push once  polyethylene glycol 3350 17 Gram(s) Oral daily  senna 2 Tablet(s) Oral at bedtime      TELEMETRY: 	    ECG:  	  RADIOLOGY:  OTHER: 	  	  LABS:	 	    CARDIAC MARKERS:                                8.6    15.50 )-----------( 99       ( 17 Jan 2023 14:19 )             28.2     01-17    147<H>  |  110<H>  |  32<H>  ----------------------------<  142<H>  3.6   |  23  |  0.90    Ca    9.1      17 Jan 2023 14:19  Mg     1.9     01-16    TPro  5.6<L>  /  Alb  2.2<L>  /  TBili  0.3  /  DBili  x   /  AST  32  /  ALT  29  /  AlkPhos  339<H>  01-16    proBNP: Serum Pro-Brain Natriuretic Peptide: 510 pg/mL (01-06 @ 19:43)  Serum Pro-Brain Natriuretic Peptide: 202 pg/mL (12-24 @ 05:41)    Lipid Profile:   HgA1c:   TSH:         Assessment and plan  ---------------------------  Bradycardia, Hypertension, Dyslipidemia, Hydronephrosis, R-nephrostomy tube, Liver cyst, Parathyroid tumor, Chronic pain on morphine 2.5 (last took this morning) and 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 Patient reported the pain is sharp well localized and occasionally pleuritic. Has chronic cough with bloody or brown sputum. Reports feeling dehydrated. Has not had chemo since 5/2022. Patient denied abdominal pain, N/V/D, fever, urinary symptoms, rash  pt with metastatic bladder ca / onc eval  s/p ppm , sss  chest pain doubt cardiac ?metastatic ca bony mets  dvt prophylaxis  severe protein deficiency, nutrition eval   hydration  psych eval start Remeron 15 mg po qhs  pain management  PE has been ruled out several admissions  dvt prophylaxis  nutrition consult severe protein deficiency appreciated  check labs, hypercalcemia, increase fluid,  endocrine eval noted  ?abx check urin  oncology eval appreciated, awaiting ct chest abdomen and pelvis  physical therapy  replete K, keep K>4  doubt pt is a candidate for chemo being so malnourished   replete lytes  ppm was recently checked  hypotension decrease morphine dose may need to add midodrine  may decrease morphine to three times per day, bp better controlled, pain management  ?hospice inpatient  decrease hgb transfuse keep hgb>8  decrease bp sec to analgesics, add midodrine 5 mg tid

## 2023-01-19 NOTE — PROGRESS NOTE ADULT - SUBJECTIVE AND OBJECTIVE BOX
SUBJECTIVE AND OBJECTIVE:  Sitting up in bed, tired.  Frail appearing .  Indication for Geriatrics and Palliative Care Services/INTERVAL HPI:  pain /symptom management     OVERNIGHT EVENTS: Pt on Morphine 5mg morphine solution ATC. pt did not use prn Morphine solution x1 5mg prn for breakthrough pain.      DNR on chart:  Allergies    No Known Allergies    Intolerances    MEDICATIONS  (STANDING):  enoxaparin Injectable 40 milliGRAM(s) SubCutaneous every 24 hours  midodrine. 5 milliGRAM(s) Oral three times a day  mirtazapine 15 milliGRAM(s) Oral at bedtime  morphine   Solution 5 milliGRAM(s) Oral every 4 hours  naloxone Injectable 0.4 milliGRAM(s) IV Push once  polyethylene glycol 3350 17 Gram(s) Oral daily  senna 2 Tablet(s) Oral at bedtime    MEDICATIONS  (PRN):  acetaminophen     Tablet .. 325 milliGRAM(s) Oral every 6 hours PRN Mild Pain (1 - 3)  aluminum hydroxide/magnesium hydroxide/simethicone Suspension 30 milliLiter(s) Oral every 4 hours PRN Dyspepsia  melatonin 3 milliGRAM(s) Oral at bedtime PRN Insomnia  morphine   Solution 2.5 milliGRAM(s) Oral every 2 hours PRN breakthrough pain  morphine   Solution 5 milliGRAM(s) Oral every 4 hours PRN Moderate Pain (4 - 6)  ondansetron Injectable 4 milliGRAM(s) IV Push every 8 hours PRN Nausea and/or Vomiting        ITEMS UNCHECKED ARE NOT PRESENT    PRESENT SYMPTOMS: [ ]Unable to self-report - see [ ] CPOT [ ] PAINADS [ ] RDOS  Source if other than patient:  [ ]Family   [ ]Team     Pain:  [x ]yes [ ]no  improved with Morphine ATC  QOL impact -   Location -      lower abdomen    perineum          Aggravating factors -  movement / positional   Quality - dull achy   Radiation - none  Timing- constant  Severity (0-10 scale):  7/10 at time of assessment   Minimal acceptable level (0-10 scale):     CPOT:    https://www.sccm.org/getattachment/vsy37n34-7f2v-9h8q-8m4n-7972p6064m2x/Critical-Care-Pain-Observation-Tool-(CPOT)    PAIN AD Score:	  [email or web address] (Ctrl + left click to view)    Dyspnea:                           [ ]Mild [ ]Moderate [ ]Severe    RDOS:  0 to 2  minimal or no respiratory distress   3  mild distress  4 to 6 moderate distress  >7 severe distress  [email or web address]://[email or web address]/handouts/hen/Respiratory_Distress_Observation_Scale.pdf (Ctrl +  left click to view)     Anxiety:                             [ ]Mild [ x]Moderate [ ]Severe  Fatigue:                             [ ]Mild [x ]Moderate [ ]Severe  Nausea:                            [ ]Mild [ ]Moderate [ ]Severe  Loss of appetite:               [ ]Mild [x ]Moderate [ x]Severe  Constipation:                    [ ]Mild [ ]Moderate [ ]Severe    PCSSQ[Palliative Care Spiritual Screening Question]   Severity (0-10):  3  Score of 4 or > indicate consideration of Chaplaincy referral.  Chaplaincy Referral: [ x] yes [ ] refused [ ] following  deferred     Caregiver [place]? : [ ] yes [ ] no  Social work referral [ ] Patient & Family Centered Care Referral [ ]   UNKNOWN     Anticipatory Grief present?:  [ ] yes [ ] no  Social work referral [ ] Patient & Family Centered Care Referral [ ]      Other Symptoms:  [x ]All other review of systems negative     Palliative Performance Status Version 2:        30 %      http://npcrc.org/files/news/palliative_performance_scale_ppsv2.pdf  PHYSICAL EXAM:    Vital Signs Last 24 Hrs  Vital Signs Last 24 Hrs  T(C): 36.5 (18 Jan 2023 04:35), Max: 36.5 (17 Jan 2023 18:40)  T(F): 97.7 (18 Jan 2023 04:35), Max: 97.7 (17 Jan 2023 18:40)  HR: 88 (18 Jan 2023 10:00) (73 - 89)  BP: 97/67 (18 Jan 2023 10:00) (86/58 - 97/67)  BP(mean): --  RR: 18 (18 Jan 2023 10:00) (18 - 18)  SpO2: 99% (18 Jan 2023 10:00) (98% - 99%)    Parameters below as of 18 Jan 2023 10:00  Patient On (Oxygen Delivery Method): nasal cannula  O2 Flow (L/min): 2         GENERAL: [x ]Cachexia    [ x]Alert  [ x]Oriented x 4  [ ]Lethargic  [ ]Unarousable  [x ]Verbal  [ ]Non-Verbal  Behavioral:   [ x]Anxiety  [ ]Delirium [ ]Agitation [ ]Other  HEENT:  [ ]Normal   [x ]Dry mouth   [ ]ET Tube/Trach  [ ]Oral lesions  PULMONARY:   [ x]Clear [ ]Tachypnea  [ ]Audible excessive secretions   [ ]Rhonchi        [ ]Right [ ]Left [ ]Bilateral  [ ]Crackles        [ ]Right [ ]Left [ ]Bilateral  [ ]Wheezing     [ ]Right [ ]Left [ ]Bilateral  [ ]Diminished BS [ ] Right [ ]Left [ ]Bilateral  CARDIOVASCULAR:    [x ]Regular [ ]Irregular [ ]Tachy  [ ]Bradley [ ]Murmur [ ]Other  GASTROINTESTINAL:  [ x]Softly distended   [ ]Distended   [x ]+BS  [ ]Non tender [ ]Tender  [ ]Other [ ]PEG [ ]OGT/ NGT   Last BM:   GENITOURINARY: spotting with BRB from perineum on tissues  [x ]Normal [ ]Incontinent   [ ]Oliguria/Anuria   [ ]Lloyd  MUSCULOSKELETAL:   [ ]Normal   [x ]Weakness  [ ]Bed/Wheelchair bound [ ]Edema  NEUROLOGIC:   [ x]No focal deficits  [ ] Cognitive impairment  [ ] Dysphagia [ ]Dysarthria [ ] Paresis [ ]Other   SKIN:   [ ]Normal  [ ]Rash  [ ]Other  [ x]Pressure ulcer(s) Stage 2 sacrum [ ]y [ ]n present on admission    CRITICAL CARE:  [ ]Shock Present  [ ]Septic [ ]Cardiogenic [ ]Neurologic [ ]Hypovolemic  [ ]Vasopressors [ ]Inotropes  [ ]Respiratory failure present [ ]Mechanical Ventilation [ ]Non-invasive ventilatory support [ ]High-Flow   [ ]Acute  [ ]Chronic [ ]Hypoxic  [ ]Hypercarbic [ ]Other  [ ]Other organ failure     LABS:                                           8.6    15.50 )-----------( 99       ( 17 Jan 2023 14:19 )             28.2   01-17    147<H>  |  110<H>  |  32<H>  ----------------------------<  142<H>  3.6   |  23  |  0.90    Ca    9.1      17 Jan 2023 14:19        RADIOLOGY & ADDITIONAL STUDIES:      Protein Calorie Malnutrition Present: [ ]mild [ ]moderate [ ]severe [ ]underweight [ ]morbid obesity  Consult Type: Non Face-to-Face.     Time-Base Billing for Non-Face-to-Face consult (Minutes) 5-10.    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 Recently d/c from  on Dec 21th for SOB. Back in  [facility] on 24th for blood transfusion now presenting to the ED via EMS from outpt office for worsening lethargy and intermittent central chest pain. IR consulted for liver biopsy.     Allergies: NKDA  Medications (Abx/Cardiac/Anticoagulation/Blood Products)  azithromycin  IVPB: 255 mL/Hr IV Intermittent (01-08 @ 21:48)  cefTRIAXone   IVPB: 100 mL/Hr IV Intermittent (01-08 @ 04:34)  cefTRIAXone   IVPB: 100 mL/Hr IV Intermittent (01-09 @ 05:31)  enoxaparin Injectable: 40 milliGRAM(s) SubCutaneous (01-09 @ 13:22)    Data:  T(C): 36.3  HR: 82  BP: 106/65  RR: 18  SpO2: 100%    -WBC 15.88 / HgB 8.7 / Hct 28.9 / Plt 232  -Na 142 / Cl 105 / BUN 34 / Glucose 76  -K 3.2 / CO2 20 / Cr 0.85  -ALT -- / Alk Phos -- / T.Bili --  -INR 1.27 / PTT 30.7    Radiology: pending CT    Assessment/Plan: 57F PMH Asthma, Anemia, Bladder cancer with  metastases to the lungs(patient of Dr Kan) on chronic home O2, Bradycardia, Hypertension, Dyslipidemia, Hydronephrosis, R-nephrostomy tube, Liver cyst, Parathyroid tumor, Chronic pain on morphine 2.5 (last took this morning) and 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 Recently d/c from  on Dec 21th for SOB. Back in  ER on 24th for blood transfusion now presenting to the ED via EMS from outpt office for worsening lethargy and intermittent central chest pain. IR consulted for liver biopsy.     **consult in progress**  < from: CT Abdomen and Pelvis No Cont (10.17.22 @ 17:54) >    ACC: [id number] EXAM:  CT CHEST                        ACC: [id number] EXAM:  CT ABDOMEN AND PELVIS                          PROCEDURE DATE:  10/17/2022          INTERPRETATION:  CLINICAL INFORMATION: Left hip and left back pain.   Concern for metastasis or stones.    COMPARISON: CT chest, abdomen and pelvis from 9/20/2022.    CONTRAST/COMPLICATIONS:  IV Contrast: None.  Oral Contrast: None.  Complications: None reported.    PROCEDURE:  CT of the Chest, Abdomen and Pelvis was performed.  Sagittal and coronal reformats were performed.    FINDINGS:  CHEST:  LUNGS AND LARGE AIRWAYS: Redemonstration of innumerable bilateral   pulmonary nodules consistent with metastases. A few of the largest   pulmonary nodules are mildly increased in size. For example, left lower   lobe nodule measures 3.3 x 2.3 cm (2:116), previously 2.8 x 2.0 cm on   9/20/2022 . A right lower lobe nodule measures 2.4 x 2.2 cm (2:103),   previously 2.2 x 2.1 cm.  PLEURA: No pleural effusion.  VESSELS: Within normal limits.  HEART: Heart size is normal. Small pericardial effusion.  MEDIASTINUM AND FLORENTIN: No lymphadenopathy.  CHEST WALL AND LOWER NECK: Left chest wall pacemaker with leads in the   right atrium and ventricle.    ABDOMEN AND PELVIS:  LIVER: Interval increase in size of hepatic metastases. For reference,   segment 7 mass measures 4.0 x 2.3 cm (2:148), previously 3.3 x 2.2 cm.  BILE DUCTS: Normal caliber.  GALLBLADDER: Within normal limits.  SPLEEN: Within normal limits.  PANCREAS: Within normal limits.  ADRENALS: Within normal limits.  KIDNEYS/URETERS: Right percutaneous nephrostomy catheter; no right   hydronephrosis. 0.4 cm non obstructing stone in the right lower pole. Left   kidney is enlarged with severe hydroureteronephrosis and markedly dilated   calyces, unchanged. There are nonobstructive stones of the left kidney.    BLADDER: Large bladder mass is without significant change.  REPRODUCTIVE ORGANS: Uterus and adnexa within normal limits.    BOWEL: No bowel obstruction. Appendix is normal.  PERITONEUM: No ascites.  VESSELS: Within normal limits.  RETROPERITONEUM/LYMPH NODES: Retroperitoneal and pelvic lymphadenopathy   is unchanged.  ABDOMINAL WALL: Within normal limits.  BONES: No evidence of osseous metastatic disease.    IMPRESSION:  1. Large bladder mass is without significant change. Severe chronic   hydronephrosis of the left kidney is unchanged. Right nephrostomy tube in   place; no right hydronephrosis.  2.  Numerous bilateral pulmonary metastases; mild interval increase in   size of a few of the largest pulmonary metastases compared to 9/20/2022.  3.  Progression of liver metastases.  4.  No evidence of osseous metastatic disease.        --- End of Report ---    < end of copied text >  [email or web address].[email or web address]/vault/2440/web/files/ONC/Table_Clinical%20Characteristics%20to%20Document%20Malnutrition-White%20JV%20et%20al%202012.pdf    Height (cm): 162.6 (01-07-23 @ 23:00), 162.6 (12-23-22 @ 22:59), 162.6 (12-01-22 @ 15:25)  Weight (kg): 36 (01-07-23 @ 23:00), 33.6 (12-23-22 @ 22:59), 35.2986 (12-23-22 @ 10:00)  BMI (kg/m2): 13.6 (01-07-23 @ 23:00), 12.7 (12-23-22 @ 22:59), 13.4 (12-23-22 @ 10:00)    [ ]PPSV2 < or = 30%  [ ]significant weight loss [x ]poor nutritional intake [ ]anasarca[ ]Artificial Nutrition    Other REFERRALS:  [ ]Hospice  [ ]Child Life  [ ]Social Work  [ x]Case management [ ]Holistic Therapy

## 2023-01-19 NOTE — PROGRESS NOTE ADULT - SUBJECTIVE AND OBJECTIVE BOX
CARDIOLOGY     PROGRESS  NOTE   ________________________________________________    CHIEF Complaint: patient is a 57y old  Female who presents with a chief complaint of chest pain (18 Jan 2023 16:07)  doing better  	  REVIEW OF SYSTEMS:  CONSTITUTIONAL: No fever, weight loss, or fatigue  EYES: No eye pain, visual disturbances, or discharge  ENT:  No difficulty hearing, tinnitus, vertigo; No sinus or throat pain  NECK: No pain or stiffness  RESPIRATORY: No cough, wheezing, chills or hemoptysis; + Shortness of Breath  CARDIOVASCULAR: No chest pain, palpitations, passing out, dizziness, or leg swelling  GASTROINTESTINAL: No abdominal or epigastric pain. No nausea, vomiting, or hematemesis; No diarrhea or constipation. No melena or hematochezia.  GENITOURINARY: No dysuria, frequency, hematuria, or incontinence  NEUROLOGICAL: No headaches, memory loss, loss of strength, numbness, or tremors  SKIN: No itching, burning, rashes, or lesions   LYMPH Nodes: No enlarged glands  ENDOCRINE: No heat or cold intolerance; No hair loss  MUSCULOSKELETAL: No joint pain or swelling; No muscle, back, or extremity pain  PSYCHIATRIC: No depression, anxiety, mood swings, or difficulty sleeping  HEME/LYMPH: No easy bruising, or bleeding gums  ALLERGY AND IMMUNOLOGIC: No hives or eczema	    [x ] All others negative	  [ ] Unable to obtain    PHYSICAL EXAM:  T(C): 36.8 (01-19-23 @ 06:12), Max: 36.8 (01-19-23 @ 06:12)  HR: 97 (01-19-23 @ 06:12) (88 - 97)  BP: 99/64 (01-19-23 @ 06:12) (88/57 - 99/65)  RR: 18 (01-19-23 @ 06:12) (18 - 18)  SpO2: 99% (01-19-23 @ 06:12) (99% - 100%)  Wt(kg): --  I&O's Summary    18 Jan 2023 07:01  -  19 Jan 2023 07:00  --------------------------------------------------------  IN: 340 mL / OUT: 650 mL / NET: -310 mL        Appearance: Normal	  HEENT:   Normal oral mucosa, PERRL, EOMI	  Lymphatic: No lymphadenopathy  Cardiovascular: Normal S1 S2, No JVD, +murmurs, No edema  Respiratory: rhonchi  Psychiatry: depress  Gastrointestinal:  Soft, Non-tender, + BS	  Skin: No rashes, No ecchymoses, No cyanosis	  Neurologic: Non-focal  Extremities: Normal range of motion, No clubbing, cyanosis or edema  Vascular: Peripheral pulses palpable 2+ bilaterally    MEDICATIONS  (STANDING):  enoxaparin Injectable 40 milliGRAM(s) SubCutaneous every 24 hours  midodrine. 5 milliGRAM(s) Oral three times a day  mirtazapine 15 milliGRAM(s) Oral at bedtime  morphine   Solution 5 milliGRAM(s) Oral every 4 hours  naloxone Injectable 0.4 milliGRAM(s) IV Push once  polyethylene glycol 3350 17 Gram(s) Oral daily  senna 2 Tablet(s) Oral at bedtime      TELEMETRY: 	    ECG:  	  RADIOLOGY:  OTHER: 	  	  LABS:	 	    CARDIAC MARKERS:                                8.6    15.50 )-----------( 99       ( 17 Jan 2023 14:19 )             28.2     01-17    147<H>  |  110<H>  |  32<H>  ----------------------------<  142<H>  3.6   |  23  |  0.90    Ca    9.1      17 Jan 2023 14:19      proBNP: Serum Pro-Brain Natriuretic Peptide: 510 pg/mL (01-06 @ 19:43)  Serum Pro-Brain Natriuretic Peptide: 202 pg/mL (12-24 @ 05:41)    Lipid Profile:   HgA1c:   TSH:         Assessment and plan  ---------------------------  Bradycardia, Hypertension, Dyslipidemia, Hydronephrosis, R-nephrostomy tube, Liver cyst, Parathyroid tumor, Chronic pain on morphine 2.5 (last took this morning) and 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 Patient reported the pain is sharp well localized and occasionally pleuritic. Has chronic cough with bloody or brown sputum. Reports feeling dehydrated. Has not had chemo since 5/2022. Patient denied abdominal pain, N/V/D, fever, urinary symptoms, rash  pt with metastatic bladder ca / onc eval  s/p ppm , sss  chest pain doubt cardiac ?metastatic ca bony mets  dvt prophylaxis  severe protein deficiency, nutrition eval   hydration  psych eval start Remeron 15 mg po qhs  pain management  PE has been ruled out several admissions  dvt prophylaxis  nutrition consult severe protein deficiency appreciated  check labs, hypercalcemia, increase fluid,  endocrine eval noted  ?abx check urin  oncology eval appreciated, awaiting ct chest abdomen and pelvis  physical therapy  replete K, keep K>4  doubt pt is a candidate for chemo being so malnourished   replete lytes  ppm was recently checked  hypotension decrease morphine dose may need to add midodrine  may decrease morphine to three times per day, bp better controlled, pain management  ?hospice inpatient  decrease hgb transfuse keep hgb>8  decrease bp sec to analgesics, add midodrine 5 mg tid  oob to chair if possible

## 2023-01-19 NOTE — DISCHARGE NOTE NURSING/CASE MANAGEMENT/SOCIAL WORK - NSDCPEFALRISK_GEN_ALL_CORE
For information on Fall & Injury Prevention, visit: [email or web address] OR  [email or web address] OR  https://www.cdc.gov/steadi/patient.html

## 2023-01-19 NOTE — PROVIDER CONTACT NOTE (MEDICATION) - SITUATION
pt received scheduled dose of 2.5 mg of morphine at 10, and is requesting to take more morphine despite baseline low BP, and refusal of midodrine. pt requesting additional dose of 1 mg morphine.

## 2023-01-19 NOTE — PROGRESS NOTE ADULT - ASSESSMENT
_________________________________________________________________________________________  ========>>  M E D I C A L   A T T E N D I N G    F O L L O W  U P  N O T E  <<=========  -----------------------------------------------------------------------------------------------------    - Patient seen and examined by me earlier today.   - In summary,  JEF HOUSE is a 57y year old woman admitted with chest discomfort   - Patient today overall doing fairly, comfortable, eating fairly > encouraged PO intake       otherwise patient weak, dizzy with movement ! " trying to get stronger"      ==================>> REVIEW OF SYSTEM <<=================    GEN: no fever, no chills, chronic pain in back / flank   RESP: no SOB, no cough, no sputum  CVS: no chest pain now reported , no palpitations  GI: no abdominal pain, no nausea  : no dysuria, no frequency, chronic intermittent hematuria and vaginal bleeding   Neuro: no headache, + dizziness with movement   Derm : no itching, no rash    ==================>> PHYSICAL EXAM <<=================    GEN: A&O X 3 , NAD , comfortable, pleasant, calm , cachectic, in bed   HEENT: NCAT, PERRL, MMM, hearing intact, temporal wasting   Neck: supple , no JVD appreciated  CVS: S1S2 , regular , No M/R/G appreciated  PULM: CTA B/L,  no W/R/R appreciated  ABD.: soft. non tender, non distended  Extrem: intact pulses , + B/L Le 2+ edema , cachectic . + nephrostomy tube with clear yellow urine   PSYCH : flat affect, not anxious                                 ( Note written / Date of service 01-19-23 )    ==================>> MEDICATIONS <<====================    enoxaparin Injectable 40 milliGRAM(s) SubCutaneous every 24 hours  midodrine. 5 milliGRAM(s) Oral three times a day  mirtazapine 15 milliGRAM(s) Oral at bedtime  morphine   Solution 5 milliGRAM(s) Oral every 4 hours  naloxone Injectable 0.4 milliGRAM(s) IV Push once  polyethylene glycol 3350 17 Gram(s) Oral daily  senna 2 Tablet(s) Oral at bedtime    MEDICATIONS  (PRN):  acetaminophen     Tablet .. 325 milliGRAM(s) Oral every 6 hours PRN Mild Pain (1 - 3)  aluminum hydroxide/magnesium hydroxide/simethicone Suspension 30 milliLiter(s) Oral every 4 hours PRN Dyspepsia  melatonin 3 milliGRAM(s) Oral at bedtime PRN Insomnia  morphine   Solution 2.5 milliGRAM(s) Oral every 2 hours PRN breakthrough pain  morphine   Solution 5 milliGRAM(s) Oral every 4 hours PRN Moderate Pain (4 - 6)  ondansetron Injectable 4 milliGRAM(s) IV Push every 8 hours PRN Nausea and/or Vomiting    ___________  Active diet:  Diet, Soft and Bite Sized:   Nikita(7 Gm Arginine/7 Gm Glut/1.2 Gm HMB     Qty per Day:  1  Liquid Protein Supplement     Qty per Day:  1  Supplement Feeding Modality:  Oral  Ensure Plus High Protein Cans or Servings Per Day:  2       Frequency:  Two Times a day  Ensure Plant-Based Cans or Servings Per Day:  1       Frequency:  Daily  ___________________    ==================>> VITAL SIGNS <<==================    Vital Signs Last 24 HrsT(C): 36.6 (01-19-23 @ 11:47)  T(F): 97.8 (01-19-23 @ 11:47), Max: 98.2 (01-19-23 @ 06:12)  HR: 90 (01-19-23 @ 11:47) (88 - 97)  BP: 96/63 (01-19-23 @ 11:47)  RR: 18 (01-19-23 @ 11:47) (18 - 18)  SpO2: 100% (01-19-23 @ 11:47) (99% - 100%)      CAPILLARY BLOOD GLUCOSE      POCT Blood Glucose.: 85 mg/dL (19 Jan 2023 13:12)  POCT Blood Glucose.: 76 mg/dL (19 Jan 2023 09:19)  POCT Blood Glucose.: 86 mg/dL (18 Jan 2023 18:14)     ==================>> LAB AND IMAGING <<==================                        8.5    13.04 )-----------( 62       ( 19 Jan 2023 06:57 )             27.9        01-17    147<H>  |  110<H>  |  32<H>  ----------------------------<  142<H>  3.6   |  23  |  0.90    Ca    9.1      17 Jan 2023 14:19      WBC count:   13.04 <<== ,  15.50 <<== ,  12.30 <<== ,  14.89 <<==   Hemoglobin:   8.5 <<==,  8.6 <<==,  6.1 <<==,  7.7 <<==  platelets:  62 <==, 99 <==, 95 <==, 93 <==    Creatinine:  0.90  <<==, 0.83  <<==, 0.77  <<==, <0.30  <<==  Sodium:   147  <==, 146  <==, 147  <==, 135  <==       AST:          32 <== , 31 <== , 28 <==      ALT:        29  <== , 23  <== , 23  <==      AP:        339  <=, 258  <=, 247  <=     Bili:        0.3  <=, 0.1  <=, 0.1  <=    ____________________________    M I C R O B I O L O G Y :    Culture - Urine (collected 09 Jan 2023 21:36)  Source: Clean Catch Clean Catch (Midstream)  Final Report (10 Fabrizio 2023 23:13):    No growth        SARS-CoV-2: [facility] (01-06-23 @ 20:40)      ___________________________________________________________________________________  ===============>>  A S S E S S M E N T   A N D   P L A N <<===============  ------------------------------------------------------------------------------------------    · Assessment	  56 y/o woman with PMH Asthma, Anemia, Bladder cancer with metastases to the lungs, direct invasion to vagina, on chronic home O2 for comfort, Bradycardia / AF post PPM, Dyslipidemia, Hydronephrosis, R-nephrostomy tube, Liver cyst, Parathyroid tumor, Chronic pain on morphine sulfate at home ( not on hospice) presenting to the ED via EMS from oncologist office for worsening lethargy and intermittent central chest pain. Found to have profound hypercalcemia.     Problem/Plan - 1:  ·  Problem: Hypercalcemia of malignancy    improved > observe off iv fluids >> patient drinking PO fluids   -s/p calcitonin and Bisphosphonate administration    - trend CMP    supplement hypokalemia / hypomagnesemia as needed  keep K~4, Mag ~2    Problem/Plan - 2:  ·  Problem: Leukocytosis.  improved   ·  Plan: -chronic  however with reported productive cough with pleuritic chest pain and elevated PCT     post treatment empirically for possible PNA     ** Anemia of chronic disease  - PRBC as ordered    monitor post    Problem/Plan - 3:  ·  Problem: Bladder cancer.   ·  Plan: -no chemo since 05/2022  onc followup   plan for liver biopsy when patient stable / better.. ?      patient too weak and deconditioned, highly doubtful if ever would be able to get any treatments   patient deconditioned with baseline cachexia, severe protein calorie malnutrition, debility     palliative following: mainly for pain control    Problem/Plan - 4:  ·  Problem: Prophylactic measure.   ·  Plan: -Diet: Regular  -DVT ppx: Lovenox qd.    patient very weak, deconditioned, debilitated... not ready for DC home    will continue to discuss with patient and team re options..  as above    Pt may be willing to consider to go to rehab     --------------------------------------------  Case discussed with patient, RN, NP, pt's sister at bedside   Education given on findings and plan of care  ___________________________  HLeslee Ochoa D.O.  Pager: [phone]       _________________________________________________________________________________________  ========>>  M E D I C A L   A T T E N D I N G    F O L L O W  U P  N O T E  <<=========  -----------------------------------------------------------------------------------------------------    - Patient seen and examined by me earlier today.   - In summary,  JEF HOUSE is a 57y year old woman admitted with chest discomfort   - Patient today overall doing fairly, comfortable, eating fairly > encouraged PO intake       otherwise patient weak, dizzy with movement ! " trying to get stronger"      ==================>> REVIEW OF SYSTEM <<=================    GEN: no fever, no chills, chronic pain in back / flank   RESP: no SOB, no cough, no sputum  CVS: no chest pain now reported , no palpitations  GI: no abdominal pain, no nausea  : no dysuria, no frequency, chronic intermittent hematuria and vaginal bleeding   Neuro: no headache, + dizziness with movement   Derm : no itching, no rash    ==================>> PHYSICAL EXAM <<=================    GEN: A&O X 3 , NAD , comfortable, pleasant, calm , cachectic, in bed   HEENT: NCAT, PERRL, MMM, hearing intact, temporal wasting   Neck: supple , no JVD appreciated  CVS: S1S2 , regular , No M/R/G appreciated  PULM: CTA B/L,  no W/R/R appreciated  ABD.: soft. non tender, non distended  Extrem: intact pulses , + B/L Le 2+ edema , cachectic . + nephrostomy tube with clear yellow urine   PSYCH : flat affect, not anxious                                 ( Note written / Date of service 01-19-23 )    ==================>> MEDICATIONS <<====================    enoxaparin Injectable 40 milliGRAM(s) SubCutaneous every 24 hours  midodrine. 5 milliGRAM(s) Oral three times a day  mirtazapine 15 milliGRAM(s) Oral at bedtime  morphine   Solution 5 milliGRAM(s) Oral every 4 hours  naloxone Injectable 0.4 milliGRAM(s) IV Push once  polyethylene glycol 3350 17 Gram(s) Oral daily  senna 2 Tablet(s) Oral at bedtime    MEDICATIONS  (PRN):  acetaminophen     Tablet .. 325 milliGRAM(s) Oral every 6 hours PRN Mild Pain (1 - 3)  aluminum hydroxide/magnesium hydroxide/simethicone Suspension 30 milliLiter(s) Oral every 4 hours PRN Dyspepsia  melatonin 3 milliGRAM(s) Oral at bedtime PRN Insomnia  morphine   Solution 2.5 milliGRAM(s) Oral every 2 hours PRN breakthrough pain  morphine   Solution 5 milliGRAM(s) Oral every 4 hours PRN Moderate Pain (4 - 6)  ondansetron Injectable 4 milliGRAM(s) IV Push every 8 hours PRN Nausea and/or Vomiting    ___________  Active diet:  Diet, Soft and Bite Sized:   Nikita(7 Gm Arginine/7 Gm Glut/1.2 Gm HMB     Qty per Day:  1  Liquid Protein Supplement     Qty per Day:  1  Supplement Feeding Modality:  Oral  Ensure Plus High Protein Cans or Servings Per Day:  2       Frequency:  Two Times a day  Ensure Plant-Based Cans or Servings Per Day:  1       Frequency:  Daily  ___________________    ==================>> VITAL SIGNS <<==================    Vital Signs Last 24 HrsT(C): 36.6 (01-19-23 @ 11:47)  T(F): 97.8 (01-19-23 @ 11:47), Max: 98.2 (01-19-23 @ 06:12)  HR: 90 (01-19-23 @ 11:47) (88 - 97)  BP: 96/63 (01-19-23 @ 11:47)  RR: 18 (01-19-23 @ 11:47) (18 - 18)  SpO2: 100% (01-19-23 @ 11:47) (99% - 100%)      CAPILLARY BLOOD GLUCOSE  POCT Blood Glucose.: 85 mg/dL (19 Jan 2023 13:12)  POCT Blood Glucose.: 76 mg/dL (19 Jan 2023 09:19)  POCT Blood Glucose.: 86 mg/dL (18 Jan 2023 18:14)     ==================>> LAB AND IMAGING <<==================                        8.5    13.04 )-----------( 62       ( 19 Jan 2023 06:57 )             27.9        01-17    147<H>  |  110<H>  |  32<H>  ----------------------------<  142<H>  3.6   |  23  |  0.90    Ca    9.1      17 Jan 2023 14:19    WBC count:   13.04 <<== ,  15.50 <<== ,  12.30 <<== ,  14.89 <<==   Hemoglobin:   8.5 <<==,  8.6 <<==,  6.1 <<==,  7.7 <<==  platelets:  62 <==, 99 <==, 95 <==, 93 <==    Creatinine:  0.90  <<==, 0.83  <<==, 0.77  <<==, <0.30  <<==  Sodium:   147  <==, 146  <==, 147  <==, 135  <==       AST:          32 <== , 31 <== , 28 <==      ALT:        29  <== , 23  <== , 23  <==      AP:        339  <=, 258  <=, 247  <=     Bili:        0.3  <=, 0.1  <=, 0.1  <=    ____________________________    M I C R O B I O L O G Y :    Culture - Urine (collected 09 Jan 2023 21:36)  Source: Clean Catch Clean Catch (Midstream)  Final Report (10 Fabrizio 2023 23:13):    No growth    SARS-CoV-2: [facility] (01-06-23 @ 20:40)    ___________________________________________________________________________________  ===============>>  A S S E S S M E N T   A N D   P L A N <<===============  ------------------------------------------------------------------------------------------    · Assessment	  56 y/o woman with PMH Asthma, Anemia, Bladder cancer with metastases to the lungs, direct invasion to vagina, on chronic home O2 for comfort, Bradycardia / AF post PPM, Dyslipidemia, Hydronephrosis, R-nephrostomy tube, Liver cyst, Parathyroid tumor, Chronic pain on morphine sulfate at home ( not on hospice) presenting to the ED via EMS from oncologist office for worsening lethargy and intermittent central chest pain. Found to have profound hypercalcemia.     Problem/Plan - 1:  ·  Problem: Hypercalcemia of malignancy    improved > observe off iv fluids >> patient drinking PO fluids   -s/p calcitonin and Bisphosphonate administration    - trend CMP    supplement hypokalemia / hypomagnesemia as needed  keep K~4, Mag ~2    Problem/Plan - 2:  ·  Problem: Leukocytosis.  improved   ·  Plan: -chronic  however with reported productive cough with pleuritic chest pain and elevated PCT     post treatment empirically for possible PNA     ** Anemia of chronic disease  - PRBC as ordered    monitor post    Problem/Plan - 3:  ·  Problem: Bladder cancer.   ·  Plan: -no chemo since 05/2022  onc followup   plan for liver biopsy when patient stable / better.. ?      patient too weak and deconditioned, highly doubtful if ever would be able to get any treatments   patient deconditioned with baseline cachexia, severe protein calorie malnutrition, debility     palliative following: mainly for pain control    Problem/Plan - 4:  ·  Problem: Prophylactic measure.   ·  Plan: -Diet: Regular  -DVT ppx: Lovenox qd.    patient very weak, deconditioned, debilitated... not ready for DC home    will continue to discuss with patient and team re options..  as above    Pt may be willing to consider to go to rehab ( d/w CM, NP)     --------------------------------------------  Case discussed with patient, RN, NP, CM  Education given on findings and plan of care  ___________________________  HLeslee Ochoa D.O.  Pager: [phone]

## 2023-01-19 NOTE — DISCHARGE NOTE NURSING/CASE MANAGEMENT/SOCIAL WORK - PATIENT PORTAL LINK FT
You can access the FollowMyHealth Patient Portal offered by [facility] by registering at the following website: [email or web address]. By joining [facility]’s FollowMyHealth portal, you will also be able to view your health information using other applications (apps) compatible with our system.

## 2023-01-19 NOTE — CHART NOTE - NSCHARTNOTEFT_GEN_A_CORE
Nutrition Follow Up Note  Patient seen for: follow up on 3 DSU    Chart reviewed, events noted.     IMM Bladder CA p/w AMS, hyperca s/p IVF, PNA s/p IV abx, refusing liver bx, 2L NC home, 24 DCN 1/19, med clear home pend DME, refuse  CARINA    Patient is s/p PRBC transfusion, prognosis guarded, as  per medical team encouraging discharge planning;     Source: [x] Patient       [x] EMR        [] RN        [] Family at bedside       [] Other:    -If unable to interview patient: [] Trach/Vent/BiPAP  [] Disoriented/confused/inappropriate to interview    Diet Order: Diet, Soft and Bite Sized:   Nikita(7 Gm Arginine/7 Gm Glut/1.2 Gm HMB     Qty per Day:  1  Liquid Protein Supplement     Qty per Day:  1  Supplement Feeding Modality:  Oral  Ensure Plus High Protein Cans or Servings Per Day:  2       Frequency:  Two Times a day  Ensure Plant-Based Cans or Servings Per Day:  1       Frequency:  Daily (01-10-23 @ 20:57) [Active]          - Is current order appropriate/adequate? [] Yes  [x]  No: rec discontinue Ensure Plant Based x 1 daily, discontinue Liquid Protein Supplement x 1 daily, Increase Nikita to 2 x daily, continue Ensure Plus High Protein Strawberry x 3 daily. continue Soft bite sized diet.     - PO intake meals :   [] >75%  Adequate    [x] 50-75%  Fair       [] <50%  Poor  - PO intake of supplements if pt receiving: []>75% []50% []25%   as per   []flow sheet  [x]patient  []family/aide  []PCA  []Nurse  []RD observation    - Nutrition-related concerns: malnutrition    pt seen by SLP []Yes [x]No    GI:  Last BM _1/15__.   Bowel Regimen? [x] Yes- Miralax, senna   [] No          Nutritionally Pertinent MEDICATIONS  (STANDING):  midodrine.  polyethylene glycol 3350  senna    Pertinent Labs:     Finger Sticks:  POCT Blood Glucose.: 85 mg/dL (01-19 @ 13:12)  POCT Blood Glucose.: 76 mg/dL (01-19 @ 09:19)  POCT Blood Glucose.: 86 mg/dL (01-18 @ 18:14)  POCT Blood Glucose.: 117 mg/dL (01-18 @ 13:26)      Pressure Injuries as per nursing documentation:   Edema: +1 bilateral legs, +2 bilateral ankles and feet    Estimated Needs:   [x] no change since previous assessment  [] recalculated:     Previous Nutrition Diagnosis: severe malnutrition, increased nutrient needs  Nutrition Diagnosis is: [x] ongoing  [] resolved [] not applicable     New Nutrition Diagnosis: [x] Not applicable    Nutrition Care Plan:  [] In Progress  [x] Achieved  [] Not applicable    Nutrition Interventions:     Education Provided:       [x] Yes:  [] No:   pt was educated on the importance of supplements to increase calorie and protein intake in light of RD's nutritional findings [x]Yes []N/A         Recommendations:         [x] Continue current diet order Soft Bite Sized     [] Change diet to:      [x] change oral nutrition supplement: discontinue Ensure Plant Based x 1 daily, Increase Nikita to 2 x daily, discontinue LPS, continue Ensure Plus HP Strawberry x 3 daily.        [] Add micronutrient supplementation:      [] Continue current micronutrient supplementation:        []Discussed recommendations with provider     [] Needed to escalate to provider     [x]Placed pending verification with NP/PA      []Placed pending verification with Team      []Placed sticker (malnutrition/BMI/underweight)     []Recommend swallow evaluation     [] monitor need for diet ed reinforcement     [] Other:     Monitoring and Evaluation:   Continue to monitor nutritional intake, tolerance to diet prescription, weights, labs, skin integrity      RD remains available upon request and will follow up per protocol  Sydnie Bryant MA, RD, CDN #[phone]

## 2023-01-19 NOTE — PROGRESS NOTE ADULT - PROBLEM SELECTOR PLAN 1
Current RX:  Morphine sol 5mg po q4 ATC   Morphine oral solution 5 mg q 4 hours prn moderate pain:  1 doses / 24 hours  Morphine oral solution 10 mg q 4 hours prn severe pain:  none used - d/c'd    added Morphine 2.5mg sol po q2h prn as pt requested a lower dose for breakthrough pain.    Pt reports that pain is well controlled normally with increased dose.  Pt requested lower dose to be added for breakthrough pain.   Educated patient on importance of taking the pain meds when pain is acute and to use the higher dose that is available.  Patient remains reluctant to start long acting meds, although at this junction, Fentanyl likely not able to absorb due to severe cachexia and refusing to take pills

## 2023-01-19 NOTE — PROVIDER CONTACT NOTE (MEDICATION) - BACKGROUND
pt has been refusing meds except for morphine, especially midodrine, despite education on necessity of pt maintaining a healthy BP. pt has bladder CA and is on morphine q4 for pain.

## 2023-01-20 NOTE — PROGRESS NOTE ADULT - SUBJECTIVE AND OBJECTIVE BOX
SUBJECTIVE AND OBJECTIVE:  Awake and complaining about overnight noise from a patient and inability to sleep. Agitated , wants to go to [facility] and get strong  Indication for Geriatrics and Palliative Care Services/INTERVAL HPI:  pain management    OVERNIGHT EVENTS: as noted     DNR on chart:  Allergies    No Known Allergies    Intolerances    lactose (Unknown)  MEDICATIONS  (STANDING):  enoxaparin Injectable 40 milliGRAM(s) SubCutaneous every 24 hours  midodrine. 5 milliGRAM(s) Oral three times a day  mirtazapine 15 milliGRAM(s) Oral at bedtime  morphine   Solution 5 milliGRAM(s) Oral every 4 hours  naloxone Injectable 0.4 milliGRAM(s) IV Push once  polyethylene glycol 3350 17 Gram(s) Oral daily  senna 2 Tablet(s) Oral at bedtime    MEDICATIONS  (PRN):  acetaminophen     Tablet .. 325 milliGRAM(s) Oral every 6 hours PRN Mild Pain (1 - 3)  aluminum hydroxide/magnesium hydroxide/simethicone Suspension 30 milliLiter(s) Oral every 4 hours PRN Dyspepsia  melatonin 3 milliGRAM(s) Oral at bedtime PRN Insomnia  morphine   Solution 2.5 milliGRAM(s) Oral every 4 hours PRN Moderate Pain (4 - 6)  morphine   Solution 5 milliGRAM(s) Oral every 4 hours PRN Severe Pain (7 - 10)  naloxone Injectable 0.1 milliGRAM(s) IV Push every 3 minutes PRN sedation or respiratory depression  ondansetron Injectable 4 milliGRAM(s) IV Push every 8 hours PRN Nausea and/or Vomiting      ITEMS UNCHECKED ARE NOT PRESENT    PRESENT SYMPTOMS: [ ]Unable to self-report - see [ ] CPOT [ ] PAINADS [ ] RDOS  Source if other than patient:  [ ]Family   [ ]Team     Pain:  [ ]yes [x ]no  QOL impact -   Location -                    Aggravating factors -  Quality -  Radiation -  Timing-  Severity (0-10 scale):  Minimal acceptable level (0-10 scale):     CPOT:    https://www.sccm.org/getattachment/dqy19w31-8o2o-9h6h-2x7m-0639q2337m8u/Critical-Care-Pain-Observation-Tool-(CPOT)    PAIN AD Score:	  http://geriatrictoolkit.[facility]/cog/painad.pdf (Ctrl + left click to view)    Dyspnea:                           [ ]Mild [ ]Moderate [ ]Severe    RDOS:  0 to 2  minimal or no respiratory distress  0  3  mild distress  4 to 6 moderate distress  >7 severe distress  https://homecareinformation.net/handouts/hen/Respiratory_Distress_Observation_Scale.pdf (Ctrl +  left click to view)     Anxiety:                             [ ]Mild [ x]Moderate [x ]Severe  Fatigue:                             [ ]Mild [x ]Moderate [ ]Severe  Nausea:                             [ ]Mild [ ]Moderate [ ]Severe  Loss of appetite:              [ ]Mild [ ]Moderate [x ]Severe  Constipation:                    [ ]Mild [ ]Moderate [ ]Severe    PCSSQ[Palliative Care Spiritual Screening Question]   Severity (0-10):  Score of 4 or > indicate consideration of Chaplaincy referral.  Chaplaincy Referral: [ ] yes [ ] refused [ ] following    Caregiver [place]? : [ ] yes [ ] no  Social work referral [ ] Patient & Family Centered Care Referral [ ]     Anticipatory Grief present?:  [ ] yes [ ] no  Social work referral [ ] Patient & Family Centered Care Referral [ ]      Other Symptoms:  [x ]All other review of systems negative     Palliative Performance Status Version 2:    30     %      http://npcrc.org/files/news/palliative_performance_scale_ppsv2.pdf  PHYSICAL EXAM:  Vital Signs Last 24 Hrs  T(C): 36.4 (2023 04:09), Max: 36.6 (2023 11:47)  T(F): 97.5 (2023 04:09), Max: 97.9 (2023 20:45)  HR: 84 (2023 10:10) (78 - 92)  BP: 91/61 (2023 10:10) (72/48 - 120/76)  BP(mean): --  RR: 18 (2023 10:10) (18 - 18)  SpO2: 93% (2023 10:10) (92% - 100%)    Parameters below as of 2023 10:10  Patient On (Oxygen Delivery Method): nasal cannula  O2 Flow (L/min): 3   I&O's Summary    2023 07:01  -  2023 07:00  --------------------------------------------------------  IN: 0 mL / OUT: 580 mL / NET: -580 mL       GENERAL: [ ]Cachexia    [ x]Alert  [ x]Oriented x   3[ ]Lethargic  [ ]Unarousable  [ x]Verbal  [ ]Non-Verbal  Behavioral:   [ x]Anxiety  [ ]Delirium [ x]Agitation [ ]Other  HEENT:  [ ]Normal   [ x]Dry mouth   [ ]ET Tube/Trach  [ ]Oral lesions  PULMONARY:   [ x]Clear [ ]Tachypnea  [ ]Audible excessive secretions   [ ]Rhonchi        [ ]Right [ ]Left [ ]Bilateral  [ ]Crackles        [ ]Right [ ]Left [ ]Bilateral  [ ]Wheezing     [ ]Right [ ]Left [ ]Bilateral  [ ]Diminished BS [ ] Right [ ]Left [ ]Bilateral  CARDIOVASCULAR:    [ x]Regular [ ]Irregular [ ]Tachy  [ ]Bradley [ ]Murmur [ ]Other  GASTROINTESTINAL:  [ x]Softly distended [ ]Distended   [ x]+BS  [ ]Non tender [ ]Tender  [ ]Other [ ]PEG [ ]OGT/ NGT   Last BM:   GENITOURINARY:   [ x]Normal [ ]Incontinent   [ ]Oliguria/Anuria   [ ]Lloyd  MUSCULOSKELETAL:   [ ]Normal   [ x]Weakness  [x ]Bed/Wheelchair bound [ ]Edema  NEUROLOGIC:   [x ]No focal deficits  [ ] Cognitive impairment  [ ] Dysphagia [ ]Dysarthria [ ] Paresis [ ]Other   SKIN:   [ ]Normal  [ ]Rash  [ ]Other  xx ]Pressure ulcer(s) [x ]y [ ]n present on admission    CRITICAL CARE:  [ ]Shock Present  [ ]Septic [ ]Cardiogenic [ ]Neurologic [ ]Hypovolemic  [ ]Vasopressors [ ]Inotropes  [ ]Respiratory failure present [ ]Mechanical Ventilation [ ]Non-invasive ventilatory support [ ]High-Flow   [ ]Acute  [ ]Chronic [ ]Hypoxic  [ ]Hypercarbic [ ]Other  [ ]Other organ failure     LABS:                        8.5    13.04 )-----------( 62       ( 2023 06:57 )             27.9           Urinalysis Basic - ( 2023 12:10 )    Color: Red / Appearance: Turbid / S.020 / pH: x  Gluc: x / Ketone: Negative  / Bili: Negative / Urobili: Negative   Blood: x / Protein: >600 / Nitrite: Negative   Leuk Esterase: Large / RBC: 25-50 /hpf / WBC >50   Sq Epi: x / Non Sq Epi: Few / Bacteria: Negative      RADIOLOGY & ADDITIONAL STUDIES:    Consult Note:   Provider Specialty:  Intervent Radiology.     Consult Type: Non Face-to-Face.     Time-Base Billing for Non-Face-to-Face consult (Minutes) 5-10.    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 Recently d/c from  on Dec 21th for SOB. Back in  ER on  for blood transfusion now presenting to the ED via EMS from outpt office for worsening lethargy and intermittent central chest pain. IR consulted for liver biopsy.     Allergies: NKDA  Medications (Abx/Cardiac/Anticoagulation/Blood Products)  azithromycin  IVPB: 255 mL/Hr IV Intermittent ( @ 21:48)  cefTRIAXone   IVPB: 100 mL/Hr IV Intermittent ( @ 04:34)  cefTRIAXone   IVPB: 100 mL/Hr IV Intermittent ( @ 05:31)  enoxaparin Injectable: 40 milliGRAM(s) SubCutaneous ( @ 13:22)    Data:  T(C): 36.3  HR: 82  BP: 106/65  RR: 18  SpO2: 100%    -WBC 15.88 / HgB 8.7 / Hct 28.9 / Plt 232  -Na 142 / Cl 105 / BUN 34 / Glucose 76  -K 3.2 / CO2 20 / Cr 0.85  -ALT -- / Alk Phos -- / T.Bili --  -INR 1.27 / PTT 30.7    Radiology: pending CT    Assessment/Plan: 57F PMH Asthma, Anemia, Bladder cancer with  metastases to the lungs(patient of Dr Kan) on chronic home O2, Bradycardia, Hypertension, Dyslipidemia, Hydronephrosis, R-nephrostomy tube, Liver cyst, Parathyroid tumor, Chronic pain on morphine 2.5 (last took this morning) and 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 Recently d/c from  on Dec 21th for SOB. Back in  ER on  for blood transfusion now presenting to the ED via EMS from outpt office for worsening lethargy and intermittent central chest pain. IR consulted for liver biopsy.     **consult in progress**      Electronic Signatures:  Shelia Gonzalez (NP)  (Signed 2023 15:53)  	Authored: Consult Note, Referral/Consultation, Subjective and Objective      Protein Calorie Malnutrition Present: [ ]mild [ ]moderate [x ]severe [ ]underweight [ ]morbid obesity  https://www.andeal.org/vault/2440/web/files/ONC/Table_Clinical%20Characteristics%20to%20Document%20Malnutrition-White%20JV%20et%20al%481948.pdf    Height (cm): 162.6 (23 @ 23:00), 162.6 (22 @ 22:59), 162.6 (22 @ 15:25)  Weight (kg): 36 (23 @ 23:00), 33.6 (22 @ 22:59), 35.2986 (22 @ 10:00)  BMI (kg/m2): 13.6 (23 @ 23:00), 12.7 (22 @ 22:59), 13.4 (22 @ 10:00)    [ ]PPSV2 < or = 30%  [ ]significant weight loss [x ]poor nutritional intake [ ]anasarca[ ]Artificial Nutrition    Other REFERRALS:  [ ]Hospice  [ ]Child Life  [ ]Social Work  [ ]Case management [ ]Holistic Therapy

## 2023-01-20 NOTE — PROGRESS NOTE ADULT - SUBJECTIVE AND OBJECTIVE BOX
CARDIOLOGY     PROGRESS  NOTE   ________________________________________________    CHIEF COMPLAINT:Patient is a 57y old  Female who presents with a chief complaint of chest pain (19 Jan 2023 14:02)  no complain  	  REVIEW OF SYSTEMS:  CONSTITUTIONAL: No fever, weight loss, or fatigue  EYES: No eye pain, visual disturbances, or discharge  ENT:  No difficulty hearing, tinnitus, vertigo; No sinus or throat pain  NECK: No pain or stiffness  RESPIRATORY: No cough, wheezing, chills or hemoptysis; + mild Shortness of Breath  CARDIOVASCULAR: No chest pain, palpitations, passing out, dizziness, or leg swelling  GASTROINTESTINAL: No abdominal or epigastric pain. No nausea, vomiting, or hematemesis; No diarrhea or constipation. No melena or hematochezia.  GENITOURINARY: No dysuria, frequency, hematuria, or incontinence  NEUROLOGICAL: No headaches, memory loss, loss of strength, numbness, or tremors  SKIN: No itching, burning, rashes, or lesions   LYMPH Nodes: No enlarged glands  ENDOCRINE: No heat or cold intolerance; No hair loss  MUSCULOSKELETAL: No joint pain or swelling; No muscle, back, or extremity pain  PSYCHIATRIC: No depression, anxiety, mood swings, or difficulty sleeping  HEME/LYMPH: No easy bruising, or bleeding gums  ALLERGY AND IMMUNOLOGIC: No hives or eczema	    [ x] All others negative	  [ ] Unable to obtain    PHYSICAL EXAM:  T(C): 36.4 (01-20-23 @ 04:09), Max: 36.6 (01-19-23 @ 11:47)  HR: 82 (01-20-23 @ 04:09) (78 - 92)  BP: 95/65 (01-20-23 @ 04:09) (72/48 - 120/76)  RR: 18 (01-20-23 @ 04:09) (18 - 18)  SpO2: 92% (01-20-23 @ 04:09) (92% - 100%)  Wt(kg): --  I&O's Summary    19 Jan 2023 07:01  -  20 Jan 2023 07:00  --------------------------------------------------------  IN: 0 mL / OUT: 580 mL / NET: -580 mL        Appearance: Normal	  HEENT:   Normal oral mucosa, PERRL, EOMI	  Lymphatic: No lymphadenopathy  Cardiovascular: Normal S1 S2, No JVD, + murmurs, No edema  Respiratory: rhonchi  Psychiatry: depress  Gastrointestinal:  Soft, Non-tender, + BS	  Skin: No rashes, No ecchymoses, No cyanosis	  Neurologic: Non-focal  Extremities: Normal range of motion, No clubbing, cyanosis or edema  Vascular: Peripheral pulses palpable 2+ bilaterally    MEDICATIONS  (STANDING):  enoxaparin Injectable 40 milliGRAM(s) SubCutaneous every 24 hours  midodrine. 5 milliGRAM(s) Oral three times a day  mirtazapine 15 milliGRAM(s) Oral at bedtime  morphine   Solution 5 milliGRAM(s) Oral every 4 hours  naloxone Injectable 0.4 milliGRAM(s) IV Push once  polyethylene glycol 3350 17 Gram(s) Oral daily  senna 2 Tablet(s) Oral at bedtime      TELEMETRY: 	    ECG:  	  RADIOLOGY:  OTHER: 	  	  LABS:	 	    CARDIAC MARKERS:                                8.5    13.04 )-----------( 62       ( 19 Jan 2023 06:57 )             27.9           proBNP: Serum Pro-Brain Natriuretic Peptide: 510 pg/mL (01-06 @ 19:43)  Serum Pro-Brain Natriuretic Peptide: 202 pg/mL (12-24 @ 05:41)    Lipid Profile:   HgA1c:   TSH:         Assessment and plan  ---------------------------  Bradycardia, Hypertension, Dyslipidemia, Hydronephrosis, R-nephrostomy tube, Liver cyst, Parathyroid tumor, Chronic pain on morphine 2.5 (last took this morning) and 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 Patient reported the pain is sharp well localized and occasionally pleuritic. Has chronic cough with bloody or brown sputum. Reports feeling dehydrated. Has not had chemo since 5/2022. Patient denied abdominal pain, N/V/D, fever, urinary symptoms, rash  pt with metastatic bladder ca / onc eval  s/p ppm , sss  chest pain doubt cardiac ?metastatic ca bony mets  dvt prophylaxis  severe protein deficiency, nutrition eval   hydration  psych eval start Remeron 15 mg po qhs  pain management  PE has been ruled out several admissions  dvt prophylaxis  nutrition consult severe protein deficiency appreciated  check labs, hypercalcemia, increase fluid,  endocrine eval noted  ?abx check urin  oncology eval appreciated, awaiting ct chest abdomen and pelvis  physical therapy  replete K, keep K>4  doubt pt is a candidate for chemo being so malnourished   replete lytes  ppm was recently checked  hypotension decrease morphine dose may need to add midodrine  may decrease morphine to three times per day, bp better controlled, pain management  ?hospice inpatient pt can not take care of herself  decrease hgb transfuse keep hgb>8  decrease bp sec to analgesics, add midodrine 5 mg tid  oob to chair if possible

## 2023-01-20 NOTE — PROGRESS NOTE ADULT - ASSESSMENT
_________________________________________________________________________________________  ========>>  M E D I C A L   A T T E N D I N G    F O L L O W  U P  N O T E  <<=========  -----------------------------------------------------------------------------------------------------    - Patient seen and examined by me earlier today.   - In summary,  JEF HOUSE is a 57y year old woman admitted with chest discomfort   - Patient today overall doing fairly, comfortable, eating fairly > encouraged PO intake      ==================>> REVIEW OF SYSTEM <<=================    GEN: no fever, no chills, chronic pain in back / flank   RESP: no SOB, no cough, no sputum  CVS: no chest pain now reported , no palpitations  GI: no abdominal pain, no nausea  : no dysuria, no frequency, chronic hematuria and vaginal bleeding   Neuro: no headache, + dizziness with movement   Derm : no itching, no rash    ==================>> PHYSICAL EXAM <<=================    GEN: A&O X 3 , NAD , comfortable, pleasant, calm , cachectic, in bed   HEENT: NCAT, PERRL, MMM, hearing intact, temporal wasting   Neck: supple , no JVD appreciated  CVS: S1S2 , regular , No M/R/G appreciated  PULM: CTA B/L,  no W/R/R appreciated  ABD.: soft. non tender, non distended  Extrem: intact pulses , + B/L Le 2+ edema , cachectic . + nephrostomy tube with clear yellow urine   PSYCH : flat affect, not anxious                                  ( Note written / Date of service 01-20-23 )    ==================>> MEDICATIONS <<====================    chlorhexidine 2% Cloths 1 Application(s) Topical <User Schedule>  enoxaparin Injectable 40 milliGRAM(s) SubCutaneous every 24 hours  midodrine. 5 milliGRAM(s) Oral three times a day  mirtazapine 15 milliGRAM(s) Oral at bedtime  morphine   Solution 5 milliGRAM(s) Oral every 4 hours  naloxone Injectable 0.4 milliGRAM(s) IV Push once  polyethylene glycol 3350 17 Gram(s) Oral daily  senna 2 Tablet(s) Oral at bedtime    MEDICATIONS  (PRN):  acetaminophen     Tablet .. 325 milliGRAM(s) Oral every 6 hours PRN Mild Pain (1 - 3)  aluminum hydroxide/magnesium hydroxide/simethicone Suspension 30 milliLiter(s) Oral every 4 hours PRN Dyspepsia  melatonin 3 milliGRAM(s) Oral at bedtime PRN Insomnia  morphine   Solution 2.5 milliGRAM(s) Oral every 4 hours PRN Moderate Pain (4 - 6)  morphine   Solution 5 milliGRAM(s) Oral every 4 hours PRN Severe Pain (7 - 10)  naloxone Injectable 0.1 milliGRAM(s) IV Push every 3 minutes PRN sedation or respiratory depression  ondansetron Injectable 4 milliGRAM(s) IV Push every 8 hours PRN Nausea and/or Vomiting    ___________  Active diet:  Diet, Soft and Bite Sized:   Nikita(7 Gm Arginine/7 Gm Glut/1.2 Gm HMB     Qty per Day:  2  Supplement Feeding Modality:  Oral  Ensure Plus High Protein Cans or Servings Per Day:  3       Frequency:  Daily  ___________________    ==================>> VITAL SIGNS <<==================    Vital Signs Last 24 HrsT(C): 36.4 (01-20-23 @ 12:05)  T(F): 97.6 (01-20-23 @ 12:05), Max: 97.9 (01-19-23 @ 20:45)  HR: 85 (01-20-23 @ 14:29) (78 - 92)  BP: 99/62 (01-20-23 @ 14:29)  RR: 18 (01-20-23 @ 12:05) (18 - 18)  SpO2: 93% (01-20-23 @ 10:10) (92% - 100%)       ==================>> LAB AND IMAGING <<==================                        7.4    10.97 )-----------( 67       ( 20 Jan 2023 13:08 )             24.8              WBC count:   10.97 <<== ,  13.04 <<== ,  15.50 <<== ,  12.30 <<== ,  14.89 <<==   Hemoglobin:   7.4 <<==,  8.5 <<==,  8.6 <<==,  6.1 <<==,  7.7 <<==  platelets:  67 <==, 62 <==, 99 <==, 95 <==, 93 <==    Creatinine:  0.90  <<==, 0.83  <<==, 0.77  <<==  Sodium:   147  <==, 146  <==, 147  <==    ___________________________________________________________________________________  ===============>>  A S S E S S M E N T   A N D   P L A N <<===============  ------------------------------------------------------------------------------------------    · Assessment	  58 y/o woman with PMH Asthma, Anemia, Bladder cancer with metastases to the lungs, direct invasion to vagina, on chronic home O2 for comfort, Bradycardia / AF post PPM, Dyslipidemia, Hydronephrosis, R-nephrostomy tube, Liver cyst, Parathyroid tumor, Chronic pain on morphine sulfate at home ( not on hospice) presenting to the ED via EMS from oncologist office for worsening lethargy and intermittent central chest pain. Found to have profound hypercalcemia.     Problem/Plan - 1:  ·  Problem: Hypercalcemia of malignancy    improved > observe off iv fluids >> patient drinking PO fluids   -s/p calcitonin and Bisphosphonate administration    - trend CMP    supplement hypokalemia / hypomagnesemia as needed  keep K~4, Mag ~2    Problem/Plan - 2:  ·  Problem: Leukocytosis.  improved   ·  Plan: -chronic  however with reported productive cough with pleuritic chest pain and elevated PCT     post treatment empirically for possible PNA     ** Anemia of chronic disease  - PRBC as ordered > one unit toiday    monitor post    Problem/Plan - 3:  ·  Problem: Bladder cancer.   ·  Plan: -no chemo since 05/2022  onc followup   plan for liver biopsy when patient stable / better.. ?      patient too weak and deconditioned, highly doubtful if ever would be able to get any treatments   patient deconditioned with baseline cachexia, severe protein calorie malnutrition, debility     palliative following: mainly for pain control    Problem/Plan - 4:  ·  Problem: Prophylactic measure.   ·  Plan: -Diet: Regular  -DVT ppx: Lovenox qd.    patient very weak, deconditioned, debilitated... not ready for DC home    will continue to discuss with patient and team re options..  as above    Pt may be willing to consider to go to rehab ( in process)     --------------------------------------------  Case discussed with patient, RN, NP  Education given on findings and plan of care  ___________________________  H. NIEVES Ochoa.  Pager: [phone]

## 2023-01-20 NOTE — PROGRESS NOTE ADULT - PROBLEM SELECTOR PLAN 1
Current RX:  Morphine sol 5mg po q4 ATC   Morphine oral solution 2.5 mg q 4 hours prn moderate pain:  1 doses / 24 hours Current RX:  Morphine sol 5mg po q4 ATC   Morphine oral sol 5 mg PO q4 hours prn severe pain   Morphine oral solution 2.5 mg q 4 hours prn moderate pain:  1 doses / 24 hours

## 2023-01-21 NOTE — PROGRESS NOTE ADULT - SUBJECTIVE AND OBJECTIVE BOX
CARDIOLOGY     PROGRESS  NOTE   ________________________________________________    CHIEF COMPLAINT:Patient is a 57y old  Female who presents with a chief complaint of chest pain (20 Jan 2023 17:36)  slleping  	  REVIEW OF SYSTEMS:  CONSTITUTIONAL: No fever, weight loss, or fatigue  EYES: No eye pain, visual disturbances, or discharge  ENT:  No difficulty hearing, tinnitus, vertigo; No sinus or throat pain  NECK: No pain or stiffness  RESPIRATORY: No cough, wheezing, chills or hemoptysis; No Shortness of Breath  CARDIOVASCULAR: No chest pain, palpitations, passing out, dizziness, or leg swelling  GASTROINTESTINAL: No abdominal or epigastric pain. No nausea, vomiting, or hematemesis; No diarrhea or constipation. No melena or hematochezia.  GENITOURINARY: No dysuria, frequency, hematuria, or incontinence  NEUROLOGICAL: No headaches, memory loss, loss of strength, numbness, or tremors  SKIN: No itching, burning, rashes, or lesions   LYMPH Nodes: No enlarged glands  ENDOCRINE: No heat or cold intolerance; No hair loss  MUSCULOSKELETAL: No joint pain or swelling; No muscle, back, or extremity pain  PSYCHIATRIC: No depression, anxiety, mood swings, or difficulty sleeping  HEME/LYMPH: No easy bruising, or bleeding gums  ALLERGY AND IMMUNOLOGIC: No hives or eczema	    [ ] All others negative	  [ x] Unable to obtain    PHYSICAL EXAM:  T(C): 36.7 (01-21-23 @ 05:00), Max: 36.9 (01-21-23 @ 02:23)  HR: 88 (01-21-23 @ 07:44) (68 - 92)  BP: 98/63 (01-21-23 @ 07:44) (82/58 - 99/62)  RR: 18 (01-21-23 @ 05:00) (18 - 18)  SpO2: 97% (01-21-23 @ 05:00) (93% - 100%)  Wt(kg): --  I&O's Summary    20 Jan 2023 07:01  -  21 Jan 2023 07:00  --------------------------------------------------------  IN: 800 mL / OUT: 380 mL / NET: 420 mL        Appearance: Normal	  HEENT:   Normal oral mucosa, PERRL, EOMI	  Lymphatic: No lymphadenopathy  Cardiovascular: Normal S1 S2, No JVD, + murmurs, No edema  Respiratory:rhonchi  Psychiatry: alert  Gastrointestinal:  Soft, Non-tender, + BS	  Skin: No rashes, No ecchymoses, No cyanosis	  Neurologic: Non-focal  Extremities: Normal range of motion, No clubbing, cyanosis or edema  Vascular: Peripheral pulses palpable 2+ bilaterally    MEDICATIONS  (STANDING):  chlorhexidine 2% Cloths 1 Application(s) Topical <User Schedule>  enoxaparin Injectable 40 milliGRAM(s) SubCutaneous every 24 hours  midodrine. 5 milliGRAM(s) Oral three times a day  mirtazapine 15 milliGRAM(s) Oral at bedtime  morphine   Solution 5 milliGRAM(s) Oral every 4 hours  naloxone Injectable 0.4 milliGRAM(s) IV Push once  polyethylene glycol 3350 17 Gram(s) Oral daily  senna 2 Tablet(s) Oral at bedtime      TELEMETRY: 	    ECG:  	  RADIOLOGY:  OTHER: 	  	  LABS:	 	    CARDIAC MARKERS:                                9.6    12.09 )-----------( 58       ( 21 Jan 2023 07:00 )             31.0           proBNP: Serum Pro-Brain Natriuretic Peptide: 510 pg/mL (01-06 @ 19:43)  Serum Pro-Brain Natriuretic Peptide: 202 pg/mL (12-24 @ 05:41)    Lipid Profile:   HgA1c:   TSH:         Assessment and plan  ---------------------------  Bradycardia, Hypertension, Dyslipidemia, Hydronephrosis, R-nephrostomy tube, Liver cyst, Parathyroid tumor, Chronic pain on morphine 2.5 (last took this morning) and 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 Patient reported the pain is sharp well localized and occasionally pleuritic. Has chronic cough with bloody or brown sputum. Reports feeling dehydrated. Has not had chemo since 5/2022. Patient denied abdominal pain, N/V/D, fever, urinary symptoms, rash  pt with metastatic bladder ca / onc eval  s/p ppm , sss  chest pain doubt cardiac ?metastatic ca bony mets  dvt prophylaxis  severe protein deficiency, nutrition eval   hydration  psych eval start Remeron 15 mg po qhs  pain management  PE has been ruled out several admissions  dvt prophylaxis  nutrition consult severe protein deficiency appreciated  check labs, hypercalcemia, increase fluid,  endocrine eval noted  ?abx check urin  oncology eval appreciated, awaiting ct chest abdomen and pelvis  physical therapy  replete K, keep K>4  doubt pt is a candidate for chemo being so malnourished   replete lytes  ppm was recently checked  hypotension decrease morphine dose may need to add midodrine  may decrease morphine to three times per day, bp better controlled, pain management  ?hospice inpatient pt can not take care of herself  decrease hgb transfuse keep hgb>8

## 2023-01-21 NOTE — PROGRESS NOTE ADULT - ASSESSMENT
56 y/o woman with PMH Asthma, Anemia, Bladder cancer with metastases to the lungs, direct invasion to vagina, on chronic home O2 for comfort, Bradycardia / AF post PPM, Dyslipidemia, Hydronephrosis, R-nephrostomy tube, Liver cyst, Parathyroid tumor, Chronic pain on morphine sulfate at home ( not on hospice) presenting to the ED via EMS from oncologist office for worsening lethargy and intermittent central chest pain. Found to have profound hypercalcemia.      Problem/Plan - 1:  ·  Problem: Hypercalcemia of malignancy    improved > observe off iv fluids >> patient drinking PO fluids   -s/p calcitonin and Bisphosphonate administration    - trend CMP    supplement hypokalemia / hypomagnesemia as needed  keep K~4, Mag ~2     Problem/Plan - 2:  ·  Problem: Leukocytosis.  improved   ·  Plan: -chronic  however with reported productive cough with pleuritic chest pain and elevated PCT     post treatment empirically for possible PNA     ** Anemia of chronic disease  - PRBC as ordered > one unit toiday    monitor post     Problem/Plan - 3:  ·  Problem: Bladder cancer.   ·  Plan: -no chemo since 05/2022  onc followup   plan for liver biopsy when patient stable / better.. ?      patient too weak and deconditioned, highly doubtful if ever would be able to get any treatments   patient deconditioned with baseline cachexia, severe protein calorie malnutrition, debility     palliative following: mainly for pain control     Problem/Plan - 4:  ·  Problem: Prophylactic measure.   ·  Plan: -Diet: Regular  -DVT ppx: Lovenox qd.

## 2023-01-21 NOTE — PROGRESS NOTE ADULT - SUBJECTIVE AND OBJECTIVE BOX
Patient is a 57y old  Female who presents with a chief complaint of chest pain (21 Jan 2023 09:24)      DATE OF SERVICE: 01-21-23 @ 13:35    SUBJECTIVE / OVERNIGHT EVENTS: overnight events noted    ROS:  Resp: No cough no sputum production  CVS: No chest pain no palpitations no orthopnea  GI: no N/V/D  per RN          MEDICATIONS  (STANDING):  chlorhexidine 2% Cloths 1 Application(s) Topical <User Schedule>  enoxaparin Injectable 40 milliGRAM(s) SubCutaneous every 24 hours  midodrine. 5 milliGRAM(s) Oral three times a day  mirtazapine 15 milliGRAM(s) Oral at bedtime  morphine   Solution 5 milliGRAM(s) Oral every 4 hours  naloxone Injectable 0.4 milliGRAM(s) IV Push once  polyethylene glycol 3350 17 Gram(s) Oral daily  senna 2 Tablet(s) Oral at bedtime    MEDICATIONS  (PRN):  acetaminophen     Tablet .. 325 milliGRAM(s) Oral every 6 hours PRN Mild Pain (1 - 3)  aluminum hydroxide/magnesium hydroxide/simethicone Suspension 30 milliLiter(s) Oral every 4 hours PRN Dyspepsia  melatonin 3 milliGRAM(s) Oral at bedtime PRN Insomnia  morphine   Solution 2.5 milliGRAM(s) Oral every 4 hours PRN Moderate Pain (4 - 6)  morphine   Solution 5 milliGRAM(s) Oral every 4 hours PRN Severe Pain (7 - 10)  naloxone Injectable 0.1 milliGRAM(s) IV Push every 3 minutes PRN sedation or respiratory depression  ondansetron Injectable 4 milliGRAM(s) IV Push every 8 hours PRN Nausea and/or Vomiting        CAPILLARY BLOOD GLUCOSE      POCT Blood Glucose.: 103 mg/dL (21 Jan 2023 13:30)  POCT Blood Glucose.: 72 mg/dL (21 Jan 2023 09:14)  POCT Blood Glucose.: 111 mg/dL (20 Jan 2023 22:41)    I&O's Summary    20 Jan 2023 07:01  -  21 Jan 2023 07:00  --------------------------------------------------------  IN: 800 mL / OUT: 380 mL / NET: 420 mL        Vital Signs Last 24 Hrs  T(C): 36.7 (21 Jan 2023 05:00), Max: 36.9 (21 Jan 2023 02:23)  T(F): 98.1 (21 Jan 2023 05:00), Max: 98.4 (21 Jan 2023 02:23)  HR: 91 (21 Jan 2023 13:32) (68 - 92)  BP: 97/67 (21 Jan 2023 13:32) (82/58 - 99/62)  BP(mean): --  RR: 18 (21 Jan 2023 09:10) (18 - 18)  SpO2: 96% (21 Jan 2023 09:10) (96% - 100%)    PHYSICAL EXAM:  GENERAL: in no distress  EYES: EOMI, PERRLA, sclera clear  NECK: Supple, No JVD  CHEST/LUNG: clear b/l, no wheeze  HEART: S1 S2; no murmurs appreciated  ABDOMEN: Soft, Nontender, Bowel sounds present  EXTREMITIES:   no edema  NEUROLOGY: Alert non-focal  SKIN: No rashes or lesions    LABS:                        9.6    12.09 )-----------( 58       ( 21 Jan 2023 07:00 )             31.0                       All consultant(s) notes reviewed and care discussed with other providers        Contact Number, Dr Craven [phone]

## 2023-01-22 NOTE — PROGRESS NOTE ADULT - ASSESSMENT
_________________________________________________________________________________________  ========>>  M E D I C A L   A T T E N D I N G    F O L L O W  U P  N O T E  <<=========  -----------------------------------------------------------------------------------------------------    - Patient seen and examined by me earlier today.   - In summary,  JEF HOUSE is a 57y year old woman admitted with chest discomfort   - Patient today overall doing fairly, comfortable, eating fairly > encouraged PO intake      ==================>> REVIEW OF SYSTEM <<=================    GEN: no fever, no chills, chronic pain in back / flank   RESP: no SOB, no cough, no sputum  CVS: no chest pain now reported , no palpitations  GI: no abdominal pain, no nausea  : no dysuria, no frequency, chronic hematuria and vaginal bleeding   Neuro: no headache, + dizziness with movement   Derm : no itching, no rash    ==================>> PHYSICAL EXAM <<=================    GEN: A&O X 3 , NAD , comfortable, pleasant, calm , cachectic, in bed   HEENT: NCAT, PERRL, MMM, hearing intact, temporal wasting   Neck: supple , no JVD appreciated  CVS: S1S2 , regular , No M/R/G appreciated  PULM: CTA B/L,  no W/R/R appreciated  ABD.: soft. non tender, non distended  Extrem: intact pulses , + B/L Le 2+ edema , cachectic . + nephrostomy tube with clear yellow urine   PSYCH : flat affect, not anxious                                         ( Note written / Date of service 01-22-23 )    ==================>> MEDICATIONS <<====================    chlorhexidine 2% Cloths 1 Application(s) Topical <User Schedule>  enoxaparin Injectable 40 milliGRAM(s) SubCutaneous every 24 hours  midodrine. 5 milliGRAM(s) Oral three times a day  mirtazapine 15 milliGRAM(s) Oral at bedtime  morphine   Solution 5 milliGRAM(s) Oral every 4 hours  naloxone Injectable 0.4 milliGRAM(s) IV Push once  polyethylene glycol 3350 17 Gram(s) Oral daily  senna 2 Tablet(s) Oral at bedtime    MEDICATIONS  (PRN):  acetaminophen     Tablet .. 325 milliGRAM(s) Oral every 6 hours PRN Mild Pain (1 - 3)  aluminum hydroxide/magnesium hydroxide/simethicone Suspension 30 milliLiter(s) Oral every 4 hours PRN Dyspepsia  melatonin 3 milliGRAM(s) Oral at bedtime PRN Insomnia  morphine   Solution 2.5 milliGRAM(s) Oral every 4 hours PRN Moderate Pain (4 - 6)  morphine   Solution 5 milliGRAM(s) Oral every 4 hours PRN Severe Pain (7 - 10)  naloxone Injectable 0.1 milliGRAM(s) IV Push every 3 minutes PRN sedation or respiratory depression  ondansetron Injectable 4 milliGRAM(s) IV Push every 8 hours PRN Nausea and/or Vomiting    ___________  Active diet:  Diet, Soft and Bite Sized:   Nikita(7 Gm Arginine/7 Gm Glut/1.2 Gm HMB     Qty per Day:  2  Supplement Feeding Modality:  Oral  Ensure Plus High Protein Cans or Servings Per Day:  3       Frequency:  Daily  ___________________    ==================>> VITAL SIGNS <<==================    Vital Signs Last 24 HrsT(C): 36.4 (01-22-23 @ 13:49)  T(F): 97.5 (01-22-23 @ 13:49), Max: 98.2 (01-21-23 @ 21:23)  HR: 89 (01-22-23 @ 14:07) (76 - 89)  BP: 99/66 (01-22-23 @ 14:07)  RR: 18 (01-22-23 @ 13:49) (18 - 18)  SpO2: 98% (01-22-23 @ 13:49) (98% - 100%)      CAPILLARY BLOOD GLUCOSE      POCT Blood Glucose.: 82 mg/dL (22 Jan 2023 12:42)  POCT Blood Glucose.: 73 mg/dL (22 Jan 2023 08:57)  POCT Blood Glucose.: 114 mg/dL (22 Jan 2023 01:28)  POCT Blood Glucose.: 76 mg/dL (21 Jan 2023 21:38)  POCT Blood Glucose.: 97 mg/dL (21 Jan 2023 17:29)     ==================>> LAB AND IMAGING <<==================                        8.6    12.65 )-----------( 70       ( 22 Jan 2023 11:12 )             27.7              WBC count:   12.65 <<== ,  12.09 <<== ,  10.97 <<== ,  13.04 <<==   Hemoglobin:   8.6 <<==,  9.6 <<==,  7.4 <<==,  8.5 <<==  platelets:  70 <==, 58 <==, 67 <==, 62 <==, 99 <==, 95 <==    Creatinine:  0.90  <<==, 0.83  <<==  Sodium:   147  <==, 146  <==       AST:          32 <==      ALT:        29  <==      AP:        339  <=     Bili:        0.3  <=    ____________________________    M I C R O B I O L O G Y :      COVID-19 PCR: [facility] (01-20-23 @ 08:17)  SARS-CoV-2: [facility] (01-06-23 @ 20:40)    ___________________________________________________________________________________  ===============>>  A S S E S S M E N T   A N D   P L A N <<===============  ------------------------------------------------------------------------------------------    · Assessment	  56 y/o woman with PMH Asthma, Anemia, Bladder cancer with metastases to the lungs, direct invasion to vagina, on chronic home O2 for comfort, Bradycardia / AF post PPM, Dyslipidemia, Hydronephrosis, R-nephrostomy tube, Liver cyst, Parathyroid tumor, Chronic pain on morphine sulfate at home ( not on hospice) presenting to the ED via EMS from oncologist office for worsening lethargy and intermittent central chest pain. Found to have profound hypercalcemia.     Problem/Plan - 1:  ·  Problem: Hypercalcemia of malignancy    improved > observe off iv fluids >> patient drinking PO fluids   -s/p calcitonin and Bisphosphonate administration    - trend CMP    supplement hypokalemia / hypomagnesemia as needed  keep K~4, Mag ~2    Problem/Plan - 2:  ·  Problem: Leukocytosis.  improved   ·  Plan: -chronic  however with reported productive cough with pleuritic chest pain and elevated PCT     post treatment empirically for possible PNA     ** Anemia of chronic disease  - PRBC as ordered > one unit toiday    monitor post    Problem/Plan - 3:  ·  Problem: Bladder cancer.   ·  Plan: -no chemo since 05/2022  onc followup   plan for liver biopsy when patient stable / better.. ?      patient too weak and deconditioned, highly doubtful if ever would be able to get any treatments   patient deconditioned with baseline cachexia, severe protein calorie malnutrition, debility     palliative following: mainly for pain control    Problem/Plan - 4:  ·  Problem: Prophylactic measure.   ·  Plan: -Diet: Regular  -DVT ppx: Lovenox qd.    patient very weak, deconditioned, debilitated... not ready for DC home    will continue to discuss with patient and team re options..  as above    Pt may be willing to consider to go to rehab ( in process)     --------------------------------------------  Case discussed with patient, RN, NP  Education given on findings and plan of care  ___________________________  HLeslee Ochoa D.O.  Pager: [phone]       _________________________________________________________________________________________  ========>>  M E D I C A L   A T T E N D I N G    F O L L O W  U P  N O T E  <<=========  -----------------------------------------------------------------------------------------------------    - Patient seen and examined by me earlier today.   - In summary,  JEF HOUSE is a 57y year old woman admitted with chest discomfort   - Patient today overall doing fairly, comfortable, eating fairly > encouraged PO intake      ==================>> REVIEW OF SYSTEM <<=================    GEN: no fever, no chills, chronic pain in back / flank   RESP: no SOB, no cough, no sputum  CVS: no chest pain now reported , no palpitations  GI: no abdominal pain, no nausea  : no dysuria, no frequency, chronic hematuria and vaginal bleeding   Neuro: no headache, + dizziness with movement   Derm : no itching, no rash    ==================>> PHYSICAL EXAM <<=================    GEN: A&O X 3 , NAD , comfortable, pleasant, calm , cachectic, in bed   HEENT: NCAT, PERRL, MMM, hearing intact, temporal wasting   Neck: supple , no JVD appreciated  CVS: S1S2 , regular , No M/R/G appreciated  PULM: CTA B/L,  no W/R/R appreciated  ABD.: soft. non tender, non distended  Extrem: intact pulses , + B/L Le 2+ edema , cachectic . + nephrostomy tube with clear yellow urine   PSYCH : flat affect, not anxious                                         ( Note written / Date of service 01-22-23 )    ==================>> MEDICATIONS <<====================    chlorhexidine 2% Cloths 1 Application(s) Topical <User Schedule>  enoxaparin Injectable 40 milliGRAM(s) SubCutaneous every 24 hours  midodrine. 5 milliGRAM(s) Oral three times a day  mirtazapine 15 milliGRAM(s) Oral at bedtime  morphine   Solution 5 milliGRAM(s) Oral every 4 hours  naloxone Injectable 0.4 milliGRAM(s) IV Push once  polyethylene glycol 3350 17 Gram(s) Oral daily  senna 2 Tablet(s) Oral at bedtime    MEDICATIONS  (PRN):  acetaminophen     Tablet .. 325 milliGRAM(s) Oral every 6 hours PRN Mild Pain (1 - 3)  aluminum hydroxide/magnesium hydroxide/simethicone Suspension 30 milliLiter(s) Oral every 4 hours PRN Dyspepsia  melatonin 3 milliGRAM(s) Oral at bedtime PRN Insomnia  morphine   Solution 2.5 milliGRAM(s) Oral every 4 hours PRN Moderate Pain (4 - 6)  morphine   Solution 5 milliGRAM(s) Oral every 4 hours PRN Severe Pain (7 - 10)  naloxone Injectable 0.1 milliGRAM(s) IV Push every 3 minutes PRN sedation or respiratory depression  ondansetron Injectable 4 milliGRAM(s) IV Push every 8 hours PRN Nausea and/or Vomiting    ___________  Active diet:  Diet, Soft and Bite Sized:   Nikita(7 Gm Arginine/7 Gm Glut/1.2 Gm HMB     Qty per Day:  2  Supplement Feeding Modality:  Oral  Ensure Plus High Protein Cans or Servings Per Day:  3       Frequency:  Daily  ___________________    ==================>> VITAL SIGNS <<==================    Vital Signs Last 24 HrsT(C): 36.4 (01-22-23 @ 13:49)  T(F): 97.5 (01-22-23 @ 13:49), Max: 98.2 (01-21-23 @ 21:23)  HR: 89 (01-22-23 @ 14:07) (76 - 89)  BP: 99/66 (01-22-23 @ 14:07)  RR: 18 (01-22-23 @ 13:49) (18 - 18)  SpO2: 98% (01-22-23 @ 13:49) (98% - 100%)      CAPILLARY BLOOD GLUCOSE  POCT Blood Glucose.: 82 mg/dL (22 Jan 2023 12:42)  POCT Blood Glucose.: 73 mg/dL (22 Jan 2023 08:57)  POCT Blood Glucose.: 114 mg/dL (22 Jan 2023 01:28)  POCT Blood Glucose.: 76 mg/dL (21 Jan 2023 21:38)  POCT Blood Glucose.: 97 mg/dL (21 Jan 2023 17:29)     ==================>> LAB AND IMAGING <<==================                        8.6    12.65 )-----------( 70       ( 22 Jan 2023 11:12 )             27.7        WBC count:   12.65 <<== ,  12.09 <<== ,  10.97 <<== ,  13.04 <<==   Hemoglobin:   8.6 <<==,  9.6 <<==,  7.4 <<==,  8.5 <<==  platelets:  70 <==, 58 <==, 67 <==, 62 <==, 99 <==, 95 <==    Creatinine:  0.90  <<==, 0.83  <<==  Sodium:   147  <==, 146  <==    ___________________________________________________________________________________  ===============>>  A S S E S S M E N T   A N D   P L A N <<===============  ------------------------------------------------------------------------------------------    · Assessment	  58 y/o woman with PMH Asthma, Anemia, Bladder cancer with metastases to the lungs, direct invasion to vagina, on chronic home O2 for comfort, Bradycardia / AF post PPM, Dyslipidemia, Hydronephrosis, R-nephrostomy tube, Liver cyst, Parathyroid tumor, Chronic pain on morphine sulfate at home ( not on hospice) presenting to the ED via EMS from oncologist office for worsening lethargy and intermittent central chest pain. Found to have profound hypercalcemia.     Problem/Plan - 1:  ·  Problem: Hypercalcemia of malignancy    improved > observe off iv fluids >> patient drinking PO fluids   -s/p calcitonin and Bisphosphonate administration    - trend CMP    supplement hypokalemia / hypomagnesemia as needed  keep K~4, Mag ~2    Problem/Plan - 2:  ·  Problem: Leukocytosis.  improved   ·  Plan: -chronic  however with reported productive cough with pleuritic chest pain and elevated PCT     post treatment empirically for possible PNA     ** Anemia of chronic disease  - PRBC as ordered > one unit toiday    monitor post    Problem/Plan - 3:  ·  Problem: Bladder cancer.   ·  Plan: -no chemo since 05/2022  onc followup   plan for liver biopsy when patient stable / better.. ?      patient too weak and deconditioned, highly doubtful if ever would be able to get any treatments   patient deconditioned with baseline cachexia, severe protein calorie malnutrition, debility     palliative following: mainly for pain control    Problem/Plan - 4:  ·  Problem: Prophylactic measure.   ·  Plan: -Diet: Regular  -DVT ppx: Lovenox qd.    patient very weak, deconditioned, debilitated... not ready for DC home    will continue to discuss with patient and team re options..  as above    Pt may be willing to consider to go to rehab ( in process)     --------------------------------------------  Case discussed with patient, RN,   Education given on findings and plan of care  ___________________________  H. NIEVES Ochoa.  Pager: [phone]

## 2023-01-22 NOTE — PROGRESS NOTE ADULT - SUBJECTIVE AND OBJECTIVE BOX
CARDIOLOGY     PROGRESS  NOTE   ________________________________________________    CHIEF COMPLAINT:Patient is a 57y old  Female who presents with a chief complaint of chest pain (21 Jan 2023 13:35)  no complain  	  REVIEW OF SYSTEMS:  CONSTITUTIONAL: No fever, weight loss, or fatigue  EYES: No eye pain, visual disturbances, or discharge  ENT:  No difficulty hearing, tinnitus, vertigo; No sinus or throat pain  NECK: No pain or stiffness  RESPIRATORY: No cough, wheezing, chills or hemoptysis; + Shortness of Breath  CARDIOVASCULAR: No chest pain, palpitations, passing out, dizziness, or leg swelling  GASTROINTESTINAL: No abdominal or epigastric pain. No nausea, vomiting, or hematemesis; No diarrhea or constipation. No melena or hematochezia.  GENITOURINARY: No dysuria, frequency, hematuria, or incontinence  NEUROLOGICAL: No headaches, memory loss, loss of strength, numbness, or tremors  SKIN: No itching, burning, rashes, or lesions   LYMPH Nodes: No enlarged glands  ENDOCRINE: No heat or cold intolerance; No hair loss  MUSCULOSKELETAL: No joint pain or swelling; No muscle, back, or extremity pain  PSYCHIATRIC: No depression, anxiety, mood swings, or difficulty sleeping  HEME/LYMPH: No easy bruising, or bleeding gums  ALLERGY AND IMMUNOLOGIC: No hives or eczema	    [x ] All others negative	  [ ] Unable to obtain    PHYSICAL EXAM:  T(C): 36.4 (01-22-23 @ 04:50), Max: 36.8 (01-21-23 @ 21:23)  HR: 88 (01-22-23 @ 06:56) (76 - 91)  BP: 91/61 (01-22-23 @ 06:56) (90/56 - 110/72)  RR: 18 (01-22-23 @ 04:50) (18 - 18)  SpO2: 100% (01-22-23 @ 04:50) (99% - 100%)  Wt(kg): --  I&O's Summary    21 Jan 2023 07:01  -  22 Jan 2023 07:00  --------------------------------------------------------  IN: 320 mL / OUT: 450 mL / NET: -130 mL        Appearance: Normal	  HEENT:   Normal oral mucosa, PERRL, EOMI	  Lymphatic: No lymphadenopathy  Cardiovascular: Normal S1 S2, No JVD, + murmurs, No edema  Respiratory: rhonchi  Psychiatry: depress  Gastrointestinal:  Soft, Non-tender, + BS	  Skin: No rashes, No ecchymoses, No cyanosis	  Neurologic: Non-focal  Extremities: Normal range of motion, No clubbing, cyanosis or edema  Vascular: Peripheral pulses palpable 2+ bilaterally    MEDICATIONS  (STANDING):  chlorhexidine 2% Cloths 1 Application(s) Topical <User Schedule>  enoxaparin Injectable 40 milliGRAM(s) SubCutaneous every 24 hours  midodrine. 5 milliGRAM(s) Oral three times a day  mirtazapine 15 milliGRAM(s) Oral at bedtime  morphine   Solution 5 milliGRAM(s) Oral every 4 hours  naloxone Injectable 0.4 milliGRAM(s) IV Push once  polyethylene glycol 3350 17 Gram(s) Oral daily  senna 2 Tablet(s) Oral at bedtime      TELEMETRY: 	    ECG:  	  RADIOLOGY:  OTHER: 	  	  LABS:	 	    CARDIAC MARKERS:                                9.6    12.09 )-----------( 58       ( 21 Jan 2023 07:00 )             31.0           proBNP: Serum Pro-Brain Natriuretic Peptide: 510 pg/mL (01-06 @ 19:43)  Serum Pro-Brain Natriuretic Peptide: 202 pg/mL (12-24 @ 05:41)    Lipid Profile:   HgA1c:   TSH:         Assessment and plan  ---------------------------  Bradycardia, Hypertension, Dyslipidemia, Hydronephrosis, R-nephrostomy tube, Liver cyst, Parathyroid tumor, Chronic pain on morphine 2.5 (last took this morning) and 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 Patient reported the pain is sharp well localized and occasionally pleuritic. Has chronic cough with bloody or brown sputum. Reports feeling dehydrated. Has not had chemo since 5/2022. Patient denied abdominal pain, N/V/D, fever, urinary symptoms, rash  pt with metastatic bladder ca / onc eval  s/p ppm , sss  chest pain doubt cardiac ?metastatic ca bony mets  dvt prophylaxis  severe protein deficiency, nutrition eval   hydration  psych eval start Remeron 15 mg po qhs  pain management  PE has been ruled out several admissions  dvt prophylaxis  nutrition consult severe protein deficiency appreciated  check labs, hypercalcemia, increase fluid,  endocrine eval noted  ?abx check urin  oncology eval appreciated, awaiting ct chest abdomen and pelvis  physical therapy  replete K, keep K>4  doubt pt is a candidate for chemo being so malnourished   replete lytes  ppm was recently checked  hypotension decrease morphine dose may need to add midodrine  may decrease morphine to three times per day, bp better controlled, pain management  ?hospice inpatient pt can not take care of herself  decrease hgb transfuse keep hgb>8

## 2023-01-23 NOTE — DISCHARGE NOTE PROVIDER - HOSPITAL COURSE
58 y/o woman with PMH Asthma, Anemia, Bladder cancer with metastases to the lungs, direct invasion to vagina, on chronic home O2 for comfort, Bradycardia / AF post PPM, Dyslipidemia, Hydronephrosis, R-nephrostomy tube, Liver cyst, Parathyroid tumor, Chronic pain on morphine sulfate at home ( not on hospice) presenting to the ED via EMS from oncologist office for worsening lethargy and intermittent central chest pain. Found to have profound hypercalcemia.     #Hypercalcemia of Malignancy  - Serum Ca on admission 15.5  - S/p IVF  - S/p Zometa  - S/p Calcitonin  - Now resolved (9.1)    #Leukocytosis (Chronic)  - WBC 12.65  - Pt with reported productive cough with pleuritic chest pain and procalcitonin 1.29  - UA with large leukocytes and large blood  - UCx negative for growth  - S/p Azithromycin 500mg IV x1 and Rocephin (5 day course) 1/8-1/13/2023    #Metastatic Bladder Cancer     #Anemia of Chronic Disease     56 y/o woman with PMH Asthma, Anemia, Bladder cancer with metastases to the lungs, direct invasion to vagina, on chronic home O2 for comfort, Bradycardia / AF post PPM, Dyslipidemia, Hydronephrosis, R-nephrostomy tube, Liver cyst, Parathyroid tumor, Chronic pain on morphine sulfate at home ( not on hospice) presenting to the ED via EMS from oncologist office for worsening lethargy and intermittent central chest pain. Found to have profound hypercalcemia.     #Hypercalcemia of Malignancy  - Serum Ca on admission 15.5  - PTH 11  - S/p IVF  - S/p Zometa  - S/p Calcitonin  - Now resolved (9.1)    #Leukocytosis (Chronic)  - WBC 12.65  - Pt with reported productive cough with pleuritic chest pain and procalcitonin 1.29  - UA with large leukocytes and large blood  - UCx negative for growth  - S/p Azithromycin 500mg IV x1 and Rocephin (5 day course) 1/8-1/13/2023    #Metastatic Bladder Cancer   -Not a candidate at this time chemotherapy or immunotherapy  -No plan for inpatient chemotherapy or immunotherapy  -CT abd/pelvis/ chest for staging; pt refused  -if patient agreeable can potentially do Liver biopsy for definitive dx of mets.  -Palliative on board for pain regimen recommendations    #Chest Pain  - Troponin x2 negative  - EKG Sinus Rhythm with frequent premature ventricular complexes, right atrial enlargement.  - Cardiology Consult appreciated; doubt ACS or cardiac etiology, PE ruled out on previous admissions. Pain likely 2ry metastatic cancer with bony mets.    #Anemia of Chronic Disease  - S/p 4 units prbc's 1/16, 1/17, 1/20, 1/21  - Hgb/Hct stable

## 2023-01-23 NOTE — DISCHARGE NOTE PROVIDER - NSDCMRMEDTOKEN_GEN_ALL_CORE_FT
acetaminophen 325 mg oral tablet: 1 tab(s) orally every 4 hours  aluminum hydroxide-magnesium hydroxide 200 mg-200 mg/5 mL oral suspension: 30 milliliter(s) orally every 4 hours, As needed, Dyspepsia  bisacodyl 10 mg rectal suppository: 1 suppository(ies) rectal once a day, As needed, Constipation  Ferrex-150 oral capsule: 1 cap(s) orally once a day  folic acid 1 mg oral tablet: 1 tab(s) orally once a day  Hospital Bed :   lidocaine 4% topical film: Apply topically to affected area once a day   morphine 10 mg/5 mL oral solution: 2.5 milliliter(s) orally every 4 hours, As Needed -Moderate and Severe pain MDD:20 mL per day   polyethylene glycol 3350 oral powder for reconstitution: 17 gram(s) orally once a day  senna leaf extract oral tablet: 2 tab(s) orally once a day (at bedtime)  Transport  wheel chair :

## 2023-01-23 NOTE — DISCHARGE NOTE PROVIDER - CARE PROVIDER_API CALL
Priscila Rosario  CARDIOVASCULAR DISEASE  [address], [address]  [place] [address]  Phone: [phone]  Fax: [phone]  Follow Up Time: 2 weeks

## 2023-01-23 NOTE — CHART NOTE - NSCHARTNOTEFT_GEN_A_CORE
Pt not requiring morphine sol prn.  Appears stable to Morphine 5mg poq4h ATC.  Prn's available as ordered.  Palliative care will sign off.  Please reconsult if necessary.

## 2023-01-23 NOTE — PROVIDER CONTACT NOTE (OTHER) - BACKGROUND
57F PMH Asthma, Anemia, Bladder cancer with metastases to the lungs on chronic home O2, Bradycardia, Hypertension, Dyslipidemia, Hydronephrosis, R-nephrostomy tube,
Bladder CA with mets
bladder ca with mets to lungs, Hypercalcemia, . Leukocytosis.
pt admitted with chest pain

## 2023-01-23 NOTE — DISCHARGE NOTE PROVIDER - DETAILS OF MALNUTRITION DIAGNOSIS/DIAGNOSES
This patient has been assessed with a concern for Malnutrition and was treated during this hospitalization for the following Nutrition diagnosis/diagnoses:     -  01/09/2023: Severe protein-calorie malnutrition   -  01/09/2023: Underweight (BMI < 19)

## 2023-01-23 NOTE — PROVIDER CONTACT NOTE (OTHER) - REASON
pt c/o chest pain
pt had episode of SVT
FS 45, repeat 49, then 32, then 107
Pt refused IV Lasix after blood transfusion

## 2023-01-23 NOTE — PROGRESS NOTE ADULT - SUBJECTIVE AND OBJECTIVE BOX
CARDIOLOGY     PROGRESS  NOTE   ________________________________________________    CHIEF COMPLAINT:Patient is a 57y old  Female who presents with a chief complaint of chest pain (23 Jan 2023 01:40)  no complain  	  REVIEW OF SYSTEMS:  CONSTITUTIONAL: No fever, weight loss, or fatigue  EYES: No eye pain, visual disturbances, or discharge  ENT:  No difficulty hearing, tinnitus, vertigo; No sinus or throat pain  NECK: No pain or stiffness  RESPIRATORY: No cough, wheezing, chills or hemoptysis; No Shortness of Breath  CARDIOVASCULAR: No chest pain, palpitations, passing out, dizziness, or leg swelling  GASTROINTESTINAL: No abdominal or epigastric pain. No nausea, vomiting, or hematemesis; No diarrhea or constipation. No melena or hematochezia.  GENITOURINARY: No dysuria, frequency, hematuria, or incontinence  NEUROLOGICAL: No headaches, memory loss, loss of strength, numbness, or tremors  SKIN: No itching, burning, rashes, or lesions   LYMPH Nodes: No enlarged glands  ENDOCRINE: No heat or cold intolerance; No hair loss  MUSCULOSKELETAL: No joint pain or swelling; No muscle, back, or extremity pain  PSYCHIATRIC: No depression, anxiety, mood swings, or difficulty sleeping  HEME/LYMPH: No easy bruising, or bleeding gums  ALLERGY AND IMMUNOLOGIC: No hives or eczema	    [ x] All others negative	  [ ] Unable to obtain    PHYSICAL EXAM:  T(C): 36.6 (01-23-23 @ 04:57), Max: 36.6 (01-22-23 @ 21:17)  HR: 77 (01-23-23 @ 04:57) (77 - 89)  BP: 93/57 (01-23-23 @ 04:57) (86/56 - 99/66)  RR: 18 (01-23-23 @ 04:57) (18 - 18)  SpO2: 97% (01-23-23 @ 04:57) (97% - 99%)  Wt(kg): --  I&O's Summary    22 Jan 2023 07:01  -  23 Jan 2023 07:00  --------------------------------------------------------  IN: 480 mL / OUT: 680 mL / NET: -200 mL        Appearance: Normal	  HEENT:   Normal oral mucosa, PERRL, EOMI	  Lymphatic: No lymphadenopathy  Cardiovascular: Normal S1 S2, No JVD, + murmurs, No edema  Respiratory: rhonchi  Psychiatry:depress  Gastrointestinal:  Soft, Non-tender, + BS	  Skin: No rashes, No ecchymoses, No cyanosis	  Neurologic: Non-focal  Extremities: Normal range of motion, No clubbing, cyanosis or edema  Vascular: Peripheral pulses palpable 2+ bilaterally    MEDICATIONS  (STANDING):  chlorhexidine 2% Cloths 1 Application(s) Topical <User Schedule>  enoxaparin Injectable 40 milliGRAM(s) SubCutaneous every 24 hours  midodrine. 5 milliGRAM(s) Oral three times a day  mirtazapine 15 milliGRAM(s) Oral at bedtime  morphine   Solution 5 milliGRAM(s) Oral every 4 hours  naloxone Injectable 0.4 milliGRAM(s) IV Push once  polyethylene glycol 3350 17 Gram(s) Oral daily  senna 2 Tablet(s) Oral at bedtime      TELEMETRY: 	    ECG:  	  RADIOLOGY:  OTHER: 	  	  LABS:	 	    CARDIAC MARKERS:                                8.4    11.81 )-----------( 49       ( 23 Jan 2023 06:48 )             27.0     01-23    139  |  104  |  23  ----------------------------<  76  4.1   |  21<L>  |  0.60    Ca    8.5      23 Jan 2023 06:48      proBNP: Serum Pro-Brain Natriuretic Peptide: 510 pg/mL (01-06 @ 19:43)    Lipid Profile:   HgA1c:   TSH:         Assessment and plan  ---------------------------  Bradycardia, Hypertension, Dyslipidemia, Hydronephrosis, R-nephrostomy tube, Liver cyst, Parathyroid tumor, Chronic pain on morphine 2.5 (last took this morning) and 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 Patient reported the pain is sharp well localized and occasionally pleuritic. Has chronic cough with bloody or brown sputum. Reports feeling dehydrated. Has not had chemo since 5/2022. Patient denied abdominal pain, N/V/D, fever, urinary symptoms, rash  pt with metastatic bladder ca / onc eval  s/p ppm , sss  chest pain doubt cardiac ?metastatic ca bony mets  dvt prophylaxis  severe protein deficiency, nutrition eval   hydration  psych eval start Remeron 15 mg po qhs  pain management  PE has been ruled out several admissions  dvt prophylaxis  nutrition consult severe protein deficiency appreciated  check labs, hypercalcemia, increase fluid,  endocrine eval noted  ?abx check urin  oncology eval appreciated, awaiting ct chest abdomen and pelvis  physical therapy  replete K, keep K>4  doubt pt is a candidate for chemo being so malnourished   replete lytes  ppm was recently checked  hypotension decrease morphine dose may need to add midodrine  may decrease morphine to three times per day, bp better controlled, pain management  ?hospice inpatient pt can not take care of herself  decrease hgb transfuse keep hgb>8

## 2023-01-23 NOTE — PROGRESS NOTE ADULT - ASSESSMENT
_________________________________________________________________________________________  ========>>  M E D I C A L   A T T E N D I N G    F O L L O W  U P  N O T E  <<=========  -----------------------------------------------------------------------------------------------------    - Patient seen and examined by me earlier today.   - In summary,  JEF HOUSE is a 57y year old woman admitted with chest discomfort   - Patient today overall doing fairly, comfortable, eating fairly but sugars low today> encouraged PO intake      patient more anxious today, asking to be helped, in pain ( but not always taking prescribed pain meds.. ) , calling me in the middle of night to ask if she can take Tylenol..            patient declining to take antianxiety meds ( son at bedside encouraging and giving supportive care as well )     ==================>> REVIEW OF SYSTEM <<=================    GEN: no fever, no chills, chronic pain in back / flank  and lower back / sacral area with eschar, dressed   RESP: no SOB, no cough, no sputum  CVS: no chest pain now reported , no palpitations  GI: no abdominal pain, no nausea  : no dysuria, no frequency, chronic hematuria and vaginal bleeding   Neuro: no headache, + dizziness with movement   Derm : no itching, no rash    ==================>> PHYSICAL EXAM <<=================    GEN: A&O X 3 , NAD , comfortable, pleasant, calm , in bed , emaciated   HEENT: NCAT, PERRL, MMM, hearing intact, temporal wasting   Neck: supple , no JVD appreciated  CVS: S1S2 , regular , No M/R/G appreciated  PULM: CTA B/L,  no W/R/R appreciated  ABD.: soft. non tender, non distended  Extrem: intact pulses , + B/L Le 2+ edema , cachectic . + nephrostomy tube with cloudy urine ( refusing for it to be flushed)   PSYCH : flat affect, not anxious                              ( Note written / Date of service 01-23-23 )    ==================>> MEDICATIONS <<====================    chlorhexidine 2% Cloths 1 Application(s) Topical <User Schedule>  enoxaparin Injectable 40 milliGRAM(s) SubCutaneous every 24 hours  midodrine. 5 milliGRAM(s) Oral three times a day  mirtazapine 15 milliGRAM(s) Oral at bedtime  morphine   Solution 5 milliGRAM(s) Oral every 4 hours  naloxone Injectable 0.4 milliGRAM(s) IV Push once  polyethylene glycol 3350 17 Gram(s) Oral daily  senna 2 Tablet(s) Oral at bedtime    MEDICATIONS  (PRN):  acetaminophen     Tablet .. 325 milliGRAM(s) Oral every 6 hours PRN Mild Pain (1 - 3)  aluminum hydroxide/magnesium hydroxide/simethicone Suspension 30 milliLiter(s) Oral every 4 hours PRN Dyspepsia  melatonin 3 milliGRAM(s) Oral at bedtime PRN Insomnia  morphine   Solution 2.5 milliGRAM(s) Oral every 4 hours PRN Moderate Pain (4 - 6)  morphine   Solution 5 milliGRAM(s) Oral every 4 hours PRN Severe Pain (7 - 10)  naloxone Injectable 0.1 milliGRAM(s) IV Push every 3 minutes PRN sedation or respiratory depression  ondansetron Injectable 4 milliGRAM(s) IV Push every 8 hours PRN Nausea and/or Vomiting    ___________  Active diet:  Diet, Soft and Bite Sized:   Nikita(7 Gm Arginine/7 Gm Glut/1.2 Gm HMB     Qty per Day:  2  Supplement Feeding Modality:  Oral  Ensure Plus High Protein Cans or Servings Per Day:  3       Frequency:  Daily  ___________________    ==================>> VITAL SIGNS <<==================    Vital Signs Last 24 HrsT(C): 37.1 (01-23-23 @ 20:17)  T(F): 98.8 (01-23-23 @ 20:17), Max: 98.8 (01-23-23 @ 20:17)  HR: 98 (01-23-23 @ 20:17) (72 - 98)  BP: 90/54 (01-23-23 @ 20:17)  RR: 17 (01-23-23 @ 20:17) (16 - 18)  SpO2: 100% (01-23-23 @ 20:17) (97% - 100%)      CAPILLARY BLOOD GLUCOSE  POCT Blood Glucose.: 99 mg/dL (23 Jan 2023 21:18)  POCT Blood Glucose.: 107 mg/dL (23 Jan 2023 18:31)  POCT Blood Glucose.: 32 mg/dL (23 Jan 2023 17:50)  POCT Blood Glucose.: 45 mg/dL (23 Jan 2023 17:29)  POCT Blood Glucose.: 49 mg/dL (23 Jan 2023 17:28)  POCT Blood Glucose.: 99 mg/dL (23 Jan 2023 12:27)  POCT Blood Glucose.: 75 mg/dL (23 Jan 2023 09:10)  POCT Blood Glucose.: 77 mg/dL (23 Jan 2023 08:27)  POCT Blood Glucose.: 89 mg/dL (22 Jan 2023 23:55)  POCT Blood Glucose.: 80 mg/dL (22 Jan 2023 21:38)     ==================>> LAB AND IMAGING <<==================                        8.4    11.81 )-----------( 49       ( 23 Jan 2023 06:48 )             27.0        01-23    139  |  104  |  23  ----------------------------<  76  4.1   |  21<L>  |  0.60    Ca    8.5      23 Jan 2023 06:48      WBC count:   11.81 <<== ,  12.65 <<== ,  12.09 <<== ,  10.97 <<== ,  13.04 <<==   Hemoglobin:   8.4 <<==,  8.6 <<==,  9.6 <<==,  7.4 <<==,  8.5 <<==  platelets:  49 <==, 70 <==, 58 <==, 67 <==, 62 <==    COVID-19 PCR: NotDetec (01-20-23 @ 08:17)  SARS-CoV-2: NotDete (01-06-23 @ 20:40)    ___________________________________________________________________________________  ===============>>  A S S E S S M E N T   A N D   P L A N <<===============  ------------------------------------------------------------------------------------------    · Assessment	  58 y/o woman with PMH Asthma, Anemia, Bladder cancer with metastases to the lungs, direct invasion to vagina, on chronic home O2 for comfort, Bradycardia / AF post PPM, Dyslipidemia, Hydronephrosis, R-nephrostomy tube, Liver cyst, Parathyroid tumor, Chronic pain on morphine sulfate at home ( not on hospice) presenting to the ED via EMS from oncologist office for worsening lethargy and intermittent central chest pain. Found to have profound hypercalcemia.     Problem/Plan - 1:  ·  Problem: Hypercalcemia of malignancy    improved > observe off iv fluids >> patient drinking PO fluids   -s/p calcitonin and Bisphosphonate administration    - trend CMP    supplement hypokalemia / hypomagnesemia as needed  keep K~4, Mag ~2    Problem/Plan - 2:  ·  Problem: Leukocytosis.  improved   ·  Plan: -chronic  however with reported productive cough with pleuritic chest pain and elevated PCT     post treatment empirically for possible PNA     ** Anemia of chronic disease  - PRBC as ordered > one unit toiday    monitor post    Problem/Plan - 3:  ·  Problem: Bladder cancer.       patient too weak and deconditioned, not candidate any treatments   patient deconditioned with baseline cachexia, severe protein calorie malnutrition, debility     palliative following: mainly for pain control >> signed off today !        patient not agreeable to hospice     there was supposed to be a family meeting today, unsure why it didn't happen !    Problem/Plan - 4:  ·  Problem: Prophylactic measure.   ·  Plan: -Diet: Regular  -DVT ppx: Lovenox qd.    patient very weak, deconditioned, debilitated... not ready for DC home    will continue to discuss with patient and team re options..  as above    Pt may be willing to consider to go to rehab ( in process) but patient today overall declined     --------------------------------------------  Case discussed with patient, son, RN, CM, NP...  Education given on findings and plan of care  ___________________________  H. NIEVES Ochoa.  Pager: [phone]

## 2023-01-23 NOTE — PROVIDER CONTACT NOTE (OTHER) - ACTION/TREATMENT ORDERED:
Continue to monitor.
Hypoglycemia protocol
No orders made. Only to get 1 unit PRBC tonight
NP aware, EKG and Trops done, continue to monitor

## 2023-01-23 NOTE — PROVIDER CONTACT NOTE (OTHER) - ASSESSMENT
Pt is very weak, bed bound now, refusing to get out of bed
Pt. awake and alert, drinking juice
pt denies chest pain, dizziness, nasuea. VSS.
pt with anxiety, pt c/o chest pain 10/10 non radiating , VSS 98/60 hr 81 pox not on tele ,

## 2023-01-23 NOTE — PROVIDER CONTACT NOTE (OTHER) - SITUATION
pt had a run of SVT up to 194 HR, for 3 seconds
Pt refused IV Lasix after blood transfusion, she stated she is dehydrated,. also clarify how many units of PRBC to be given
pt c/o chest pain
FS 45, repeat 49, then 32, then 107

## 2023-01-24 NOTE — BH CONSULTATION LIAISON ASSESSMENT NOTE - NSBHCHARTREVIEWLAB_PSY_A_CORE FT
7.2    11.28 )-----------( 46       ( 24 Jan 2023 06:43 )             23.8     01-23    139  |  104  |  23  ----------------------------<  76  4.1   |  21<L>  |  0.60    Ca    8.5      23 Jan 2023 06:48

## 2023-01-24 NOTE — CHART NOTE - NSCHARTNOTEFT_GEN_A_CORE
Patient is a 57y old  Female who presents with a chief complaint of chest pain (24 Jan 2023 05:47)  Seen pt with Psychiatry in order to determine if pt has capacity to make her own medical decisions.   She was also later visited by her uncle.  Pt appears to very uncomfortable secondary to lying directly onto her sacral wound.     HPI:    Vital Signs Last 24 Hrs  T(C): 36.9 (24 Jan 2023 11:47), Max: 37.1 (23 Jan 2023 20:17)  T(F): 98.5 (24 Jan 2023 11:47), Max: 98.8 (23 Jan 2023 20:17)  HR: 88 (24 Jan 2023 11:47) (76 - 98)  BP: 90/59 (24 Jan 2023 11:47) (90/54 - 101/66)  BP(mean): --  RR: 17 (24 Jan 2023 11:47) (16 - 18)  SpO2: 99% (24 Jan 2023 11:47) (99% - 100%)    Parameters below as of 24 Jan 2023 11:47  Patient On (Oxygen Delivery Method): nasal cannula    Gen: 58 y/o female very thin, cachexic and appears uncomfortable.   Skin: Pale, cool and dry.  Pt with wound to sacrum.  Dressing covering area.   CV: S1S2  Chest: Symmetrical, equal lung expansion.   Abdomen: Soft, ND/NT.   Extremities: B/L edema of lower extremities-->  3+ edema with pitting.  Pt is unable to independently move her LE 2/2 edema and being deconditioned.   Pt s/p BM on bedpan.  Unable to make comfortable.  Offered pt pain medication. She refused.  States "If I take the medication its going to stop my sore from healing!!".         Laboratory:                            7.2    11.28 )-----------( 46       ( 24 Jan 2023 06:43 )             23.8       Impression:  Hypercalcemia  Bladder Cancer with Metastasis  Anemia  Leukocytosis    Plan:  Attempted to speak with pt regarding her discharge plans.  Explained to her if she goes to [facility], she will need to participate in rehab in order to get her moving.  As per pt she states she was taking care of herself  prior to coming to the hospital.  Also explained to her if she goes home she will need 24 hours of care.  As per pt she just need to remain in the hospital until she gets a little stronger. NP spoke with her Uncle.   He states the pt's daughter and son will be traveling this week to see her. The family will have a meeting this week  to determine her discharge disposition.          Will continue to follow up with her family.  Discussed findings with Case Management and Medical Attending.                                       Dorina Toribio ANP-[facility] #[id number]

## 2023-01-24 NOTE — BH CONSULTATION LIAISON ASSESSMENT NOTE - NSSUICPROTFACT_PSY_ALL_CORE
Responsibility to children, family, or others/Identifies reasons for living/Supportive social network of family or friends/Shinto beliefs

## 2023-01-24 NOTE — BH CONSULTATION LIAISON ASSESSMENT NOTE - HPI (INCLUDE ILLNESS QUALITY, SEVERITY, DURATION, TIMING, CONTEXT, MODIFYING FACTORS, ASSOCIATED SIGNS AND SYMPTOMS)
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    Met with NP and primary team NP dm Cam has been recommended for [facility] vs. home with 24/7 care.  When discussing d/c plan, pt states she feels anxious and overwhelmed, is unable to make a decision regarding her preference for discharge. States she has a lot of pain due to bed sores, reports she was completely independent before hospitalization, and believes she will feel better by "healing bed sores and excess calcium."  Pt able to express understanding that she is weak and deconditioned, at risk of falls, but unable to rationally manipulate the information provided to her on her rehab needs vs. home care needs.  Denies SI/HI, AVH, paranoia, or prior suicide attempts.  Denies prior symptoms of anxiety depression, or substance abuse. Has supportive relationships with her children, extended family.  States she is currently feeling anxious 2/2 pain.

## 2023-01-24 NOTE — BH CONSULTATION LIAISON ASSESSMENT NOTE - SUMMARY
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  Pt unable to express her choice regarding d/c planning, unable to demonstrate rational manipulation of the information provided to her regarding [facility] vs. home with home services.  Describes feeling anxious 2/2 pain and her medical issues, but denies SI/HI, previous psychiatric hx, or substance use disorders.  Pt at this time does not have capacity to participate in d/c planning.

## 2023-01-24 NOTE — BH CONSULTATION LIAISON ASSESSMENT NOTE - NSBHCHARTREVIEWVS_PSY_A_CORE FT
Vital Signs Last 24 Hrs  T(C): 36.9 (24 Jan 2023 05:02), Max: 37.1 (23 Jan 2023 20:17)  T(F): 98.5 (24 Jan 2023 05:02), Max: 98.8 (23 Jan 2023 20:17)  HR: 84 (24 Jan 2023 05:02) (76 - 98)  BP: 96/62 (24 Jan 2023 05:02) (90/54 - 101/66)  BP(mean): --  RR: 18 (24 Jan 2023 05:02) (16 - 18)  SpO2: 100% (24 Jan 2023 05:02) (100% - 100%)    Parameters below as of 24 Jan 2023 05:02  Patient On (Oxygen Delivery Method): nasal cannula  O2 Flow (L/min): 3

## 2023-01-24 NOTE — PROGRESS NOTE ADULT - SUBJECTIVE AND OBJECTIVE BOX
CARDIOLOGY     PROGRESS  NOTE   ________________________________________________    CHIEF COMPLAINT:Patient is a 57y old  Female who presents with a chief complaint of chest pain (23 Jan 2023 21:13)  not doing well  	  REVIEW OF SYSTEMS:  CONSTITUTIONAL: No fever, weight loss, or fatigue  EYES: No eye pain, visual disturbances, or discharge  ENT:  No difficulty hearing, tinnitus, vertigo; No sinus or throat pain  NECK: No pain or stiffness  RESPIRATORY: No cough, wheezing, chills or hemoptysis; + Shortness of Breath  CARDIOVASCULAR: No chest pain, palpitations, passing out, dizziness, or leg swelling  GASTROINTESTINAL: No abdominal or epigastric pain. No nausea, vomiting, or hematemesis; No diarrhea or constipation. No melena or hematochezia.  GENITOURINARY: No dysuria, frequency, hematuria, or incontinence  NEUROLOGICAL: No headaches, memory loss, loss of strength, numbness, or tremors  SKIN: No itching, burning, rashes, or lesions   LYMPH Nodes: No enlarged glands  ENDOCRINE: No heat or cold intolerance; No hair loss  MUSCULOSKELETAL: No joint pain or swelling; No muscle, back, or extremity pain  PSYCHIATRIC: No depression, anxiety, mood swings, or difficulty sleeping  HEME/LYMPH: No easy bruising, or bleeding gums  ALLERGY AND IMMUNOLOGIC: No hives or eczema	    [x ] All others negative	  [ ] Unable to obtain    PHYSICAL EXAM:  T(C): 36.9 (01-24-23 @ 05:02), Max: 37.1 (01-23-23 @ 20:17)  HR: 84 (01-24-23 @ 05:02) (72 - 98)  BP: 96/62 (01-24-23 @ 05:02) (90/54 - 101/66)  RR: 18 (01-24-23 @ 05:02) (16 - 18)  SpO2: 100% (01-24-23 @ 05:02) (99% - 100%)  Wt(kg): --  I&O's Summary    22 Jan 2023 07:01  -  23 Jan 2023 07:00  --------------------------------------------------------  IN: 480 mL / OUT: 680 mL / NET: -200 mL    23 Jan 2023 07:01  -  24 Jan 2023 05:47  --------------------------------------------------------  IN: 120 mL / OUT: 550 mL / NET: -430 mL        Appearance: Normal	  HEENT:   Normal oral mucosa, PERRL, EOMI	  Lymphatic: No lymphadenopathy  Cardiovascular: Normal S1 S2, No JVD, + murmurs, No edema  Respiratory:rhonchi  Psychiatry: A & O x 3, Mood & affect appropriate  Gastrointestinal:  Soft, Non-tender, + BS	  Skin: No rashes, No ecchymoses, No cyanosis	  Neurologic: Non-focal  Extremities: Normal range of motion, No clubbing, cyanosis or edema  Vascular: Peripheral pulses palpable 2+ bilaterally    MEDICATIONS  (STANDING):  chlorhexidine 2% Cloths 1 Application(s) Topical <User Schedule>  dextrose 5%. 1000 milliLiter(s) (40 mL/Hr) IV Continuous <Continuous>  enoxaparin Injectable 40 milliGRAM(s) SubCutaneous every 24 hours  midodrine. 5 milliGRAM(s) Oral three times a day  mirtazapine 15 milliGRAM(s) Oral at bedtime  morphine   Solution 5 milliGRAM(s) Oral every 4 hours  naloxone Injectable 0.4 milliGRAM(s) IV Push once  polyethylene glycol 3350 17 Gram(s) Oral daily  senna 2 Tablet(s) Oral at bedtime      TELEMETRY: 	    ECG:  	  RADIOLOGY:  OTHER: 	  	  LABS:	 	    CARDIAC MARKERS:                                8.4    11.81 )-----------( 49       ( 23 Jan 2023 06:48 )             27.0     01-23    139  |  104  |  23  ----------------------------<  76  4.1   |  21<L>  |  0.60    Ca    8.5      23 Jan 2023 06:48      proBNP: Serum Pro-Brain Natriuretic Peptide: 510 pg/mL (01-06 @ 19:43)    Lipid Profile:   HgA1c:   TSH:         Assessment and plan  ---------------------------  Bradycardia, Hypertension, Dyslipidemia, Hydronephrosis, R-nephrostomy tube, Liver cyst, Parathyroid tumor, Chronic pain on morphine 2.5 (last took this morning) and 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 Patient reported the pain is sharp well localized and occasionally pleuritic. Has chronic cough with bloody or brown sputum. Reports feeling dehydrated. Has not had chemo since 5/2022. Patient denied abdominal pain, N/V/D, fever, urinary symptoms, rash  pt with metastatic bladder ca / onc eval  s/p ppm , sss  chest pain doubt cardiac ?metastatic ca bony mets  dvt prophylaxis  severe protein deficiency, nutrition eval   hydration  psych eval start Remeron 15 mg po qhs  pain management  PE has been ruled out several admissions  dvt prophylaxis  nutrition consult severe protein deficiency appreciated  check labs, hypercalcemia, increase fluid,  endocrine eval noted  ?abx check urin  oncology eval appreciated, awaiting ct chest abdomen and pelvis  physical therapy  replete K, keep K>4  doubt pt is a candidate for chemo being so malnourished   replete lytes  ppm was recently checked  hypotension decrease morphine dose may need to add midodrine  may decrease morphine to three times per day, bp better controlled, pain management  decrease hgb transfuse keep hgb>8  discussed with family re palliative care/ hospice care

## 2023-01-24 NOTE — BH CONSULTATION LIAISON ASSESSMENT NOTE - NSBHCONSULTRECOMMENDOTHER_PSY_A_CORE FT
PRN: Ativan 0.25mg PO/IV q8h PRN anxiety    PRN: Melatonin 3mg PO qhS PRN insomnia    chaplaincy, holistic RN serafin    f/u pain management recs    Pt does not have capacity to participate in d/c planning at present time

## 2023-01-24 NOTE — PROGRESS NOTE ADULT - ASSESSMENT
_________________________________________________________________________________________  ========>>  M E D I C A L   A T T E N D I N G    F O L L O W  U P  N O T E  <<=========  -----------------------------------------------------------------------------------------------------    - Patient seen and examined by me earlier today.   - In summary,  JEF HOUSE is a 57y year old woman admitted with chest discomfort   - Patient today overall doing fairly, comfortable, eating fairly but sugars low today> encouraged PO intake      patient more anxious today, asking to be helped, in pain ( but not always taking prescribed pain meds.. ) , calling me in the middle of night to ask if she can take Tylenol..            patient declining to take antianxiety meds ( son at bedside encouraging and giving supportive care as well )     ==================>> REVIEW OF SYSTEM <<=================    GEN: no fever, no chills, chronic pain in back / flank  and lower back / sacral area with eschar, dressed   RESP: no SOB, no cough, no sputum  CVS: no chest pain now reported , no palpitations  GI: no abdominal pain, no nausea  : no dysuria, no frequency, chronic hematuria and vaginal bleeding   Neuro: no headache, + dizziness with movement   Derm : no itching, no rash    ==================>> PHYSICAL EXAM <<=================    GEN: A&O X 3 , NAD , comfortable, pleasant, calm , in bed , emaciated   HEENT: NCAT, PERRL, MMM, hearing intact, temporal wasting   Neck: supple , no JVD appreciated  CVS: S1S2 , regular , No M/R/G appreciated  PULM: CTA B/L,  no W/R/R appreciated  ABD.: soft. non tender, non distended  Extrem: intact pulses , + B/L Le 2+ edema , cachectic . + nephrostomy tube with cloudy urine ( refusing for it to be flushed)   PSYCH : flat affect, not anxious                                 ( Note written / Date of service 01-24-23 )    ==================>> MEDICATIONS <<====================    chlorhexidine 2% Cloths 1 Application(s) Topical <User Schedule>  dextrose 5%. 1000 milliLiter(s) IV Continuous <Continuous>  enoxaparin Injectable 40 milliGRAM(s) SubCutaneous every 24 hours  midodrine. 5 milliGRAM(s) Oral three times a day  mirtazapine 15 milliGRAM(s) Oral at bedtime  morphine   Solution 5 milliGRAM(s) Oral every 4 hours  naloxone Injectable 0.4 milliGRAM(s) IV Push once  polyethylene glycol 3350 17 Gram(s) Oral daily  senna 2 Tablet(s) Oral at bedtime    MEDICATIONS  (PRN):  acetaminophen     Tablet .. 325 milliGRAM(s) Oral every 6 hours PRN Mild Pain (1 - 3)  aluminum hydroxide/magnesium hydroxide/simethicone Suspension 30 milliLiter(s) Oral every 4 hours PRN Dyspepsia  melatonin 3 milliGRAM(s) Oral at bedtime PRN Insomnia  morphine   Solution 2.5 milliGRAM(s) Oral every 4 hours PRN Moderate Pain (4 - 6)  morphine   Solution 5 milliGRAM(s) Oral every 4 hours PRN Severe Pain (7 - 10)  naloxone Injectable 0.1 milliGRAM(s) IV Push every 3 minutes PRN sedation or respiratory depression  ondansetron Injectable 4 milliGRAM(s) IV Push every 8 hours PRN Nausea and/or Vomiting    ___________  Active diet:  Diet, Soft and Bite Sized:   Nikita(7 Gm Arginine/7 Gm Glut/1.2 Gm HMB     Qty per Day:  2  Supplement Feeding Modality:  Oral  Ensure Plus High Protein Cans or Servings Per Day:  3       Frequency:  Daily  ___________________    ==================>> VITAL SIGNS <<==================    Vital Signs Last 24 HrsT(C): 36.9 (01-24-23 @ 11:47)  T(F): 98.5 (01-24-23 @ 11:47), Max: 98.8 (01-23-23 @ 20:17)  HR: 88 (01-24-23 @ 11:47) (76 - 98)  BP: 90/59 (01-24-23 @ 11:47)  RR: 17 (01-24-23 @ 11:47) (17 - 18)  SpO2: 99% (01-24-23 @ 11:47) (99% - 100%)      CAPILLARY BLOOD GLUCOSE      POCT Blood Glucose.: 90 mg/dL (24 Jan 2023 15:13)  POCT Blood Glucose.: 77 mg/dL (24 Jan 2023 12:56)  POCT Blood Glucose.: 92 mg/dL (24 Jan 2023 09:09)  POCT Blood Glucose.: 104 mg/dL (23 Jan 2023 23:58)  POCT Blood Glucose.: 99 mg/dL (23 Jan 2023 21:18)  POCT Blood Glucose.: 107 mg/dL (23 Jan 2023 18:31)  POCT Blood Glucose.: 32 mg/dL (23 Jan 2023 17:50)     ==================>> LAB AND IMAGING <<==================                        7.2    11.28 )-----------( 46       ( 24 Jan 2023 06:43 )             23.8        01-23    139  |  104  |  23  ----------------------------<  76  4.1   |  21<L>  |  0.60    Ca    8.5      23 Jan 2023 06:48      WBC count:   11.28 <<== ,  11.81 <<== ,  12.65 <<== ,  12.09 <<== ,  10.97 <<==   Hemoglobin:   7.2 <<==,  8.4 <<==,  8.6 <<==,  9.6 <<==,  7.4 <<==  platelets:  46 <==, 49 <==, 70 <==, 58 <==, 67 <==, 62 <==      ___________________________________________________________________________________  ===============>>  A S S E S S M E N T   A N D   P L A N <<===============  ------------------------------------------------------------------------------------------    · Assessment	  56 y/o woman with PMH Asthma, Anemia, Bladder cancer with metastases to the lungs, direct invasion to vagina, on chronic home O2 for comfort, Bradycardia / AF post PPM, Dyslipidemia, Hydronephrosis, R-nephrostomy tube, Liver cyst, Parathyroid tumor, Chronic pain on morphine sulfate at home ( not on hospice) presenting to the ED via EMS from oncologist office for worsening lethargy and intermittent central chest pain. Found to have profound hypercalcemia.     Problem/Plan - 1:  ·  Problem: Hypercalcemia of malignancy    improved > observe off iv fluids >> patient drinking PO fluids   -s/p calcitonin and Bisphosphonate administration    - trend CMP    supplement hypokalemia / hypomagnesemia as needed  keep K~4, Mag ~2    Problem/Plan - 2:  ·  Problem: Leukocytosis.  improved   ·  Plan: -chronic  however with reported productive cough with pleuritic chest pain and elevated PCT     post treatment empirically for possible PNA     ** Anemia of chronic disease  - PRBC as ordered > one unit toiday    monitor post    Problem/Plan - 3:  ·  Problem: Bladder cancer.       patient too weak and deconditioned, not candidate any treatments   patient deconditioned with baseline cachexia, severe protein calorie malnutrition, debility     palliative following: mainly for pain control >> signed off today !        patient not agreeable to hospice     there was supposed to be a family meeting today, unsure why it didn't happen !    Problem/Plan - 4:  ·  Problem: Prophylactic measure.   ·  Plan: -Diet: Regular  -DVT ppx: Lovenox qd.    patient very weak, deconditioned, debilitated... not ready for DC home    will continue to discuss with patient and team re options..  as above    Pt may be willing to consider to go to rehab ( in process) but patient today overall declined     --------------------------------------------  Case discussed with patient, son, RN, CM, NP...  Education given on findings and plan of care  ___________________________  H. NIEVES Ochoa.  Pager: [phone]       _________________________________________________________________________________________  ========>>  M E D I C A L   A T T E N D I N G    F O L L O W  U P  N O T E  <<=========  -----------------------------------------------------------------------------------------------------    - Patient seen and examined by me earlier today.   - In summary,  JEF HOUSE is a 57y year old woman admitted with chest discomfort   - Patient today overall doing fairly, comfortable, eating fairly but sugars low last night: started on low rate IVH:: encouraged PO intake      patient less anxious , pain better controlled,     ==================>> REVIEW OF SYSTEM <<=================    GEN: no fever, no chills, chronic pain in back / flank  and lower back / sacral area with eschar, dressed   RESP: no SOB, no cough, no sputum  CVS: no chest pain now reported , no palpitations  GI: no abdominal pain, no nausea  : no dysuria, no frequency, chronic hematuria and vaginal bleeding   Neuro: no headache, + dizziness with movement   Derm : no itching, no rash    ==================>> PHYSICAL EXAM <<=================    GEN: A&O X 3 , NAD , comfortable, pleasant, calm , in bed , emaciated   HEENT: NCAT, PERRL, MMM, hearing intact, temporal wasting   Neck: supple , no JVD appreciated  CVS: S1S2 , regular , No M/R/G appreciated  PULM: CTA B/L,  no W/R/R appreciated  ABD.: soft. non tender, non distended  Extrem: intact pulses , + B/L Le 2+ edema , cachectic . + nephrostomy tube with urine   PSYCH : flat affect, not anxious                                 ( Note written / Date of service 01-24-23 )    ==================>> MEDICATIONS <<====================    chlorhexidine 2% Cloths 1 Application(s) Topical <User Schedule>  dextrose 5%. 1000 milliLiter(s) IV Continuous <Continuous>  enoxaparin Injectable 40 milliGRAM(s) SubCutaneous every 24 hours  midodrine. 5 milliGRAM(s) Oral three times a day  mirtazapine 15 milliGRAM(s) Oral at bedtime  morphine   Solution 5 milliGRAM(s) Oral every 4 hours  naloxone Injectable 0.4 milliGRAM(s) IV Push once  polyethylene glycol 3350 17 Gram(s) Oral daily  senna 2 Tablet(s) Oral at bedtime    MEDICATIONS  (PRN):  acetaminophen     Tablet .. 325 milliGRAM(s) Oral every 6 hours PRN Mild Pain (1 - 3)  aluminum hydroxide/magnesium hydroxide/simethicone Suspension 30 milliLiter(s) Oral every 4 hours PRN Dyspepsia  melatonin 3 milliGRAM(s) Oral at bedtime PRN Insomnia  morphine   Solution 2.5 milliGRAM(s) Oral every 4 hours PRN Moderate Pain (4 - 6)  morphine   Solution 5 milliGRAM(s) Oral every 4 hours PRN Severe Pain (7 - 10)  naloxone Injectable 0.1 milliGRAM(s) IV Push every 3 minutes PRN sedation or respiratory depression  ondansetron Injectable 4 milliGRAM(s) IV Push every 8 hours PRN Nausea and/or Vomiting    ___________  Active diet:  Diet, Soft and Bite Sized:   Nikita(7 Gm Arginine/7 Gm Glut/1.2 Gm HMB     Qty per Day:  2  Supplement Feeding Modality:  Oral  Ensure Plus High Protein Cans or Servings Per Day:  3       Frequency:  Daily  ___________________    ==================>> VITAL SIGNS <<==================    Vital Signs Last 24 HrsT(C): 36.9 (01-24-23 @ 11:47)  T(F): 98.5 (01-24-23 @ 11:47), Max: 98.8 (01-23-23 @ 20:17)  HR: 88 (01-24-23 @ 11:47) (76 - 98)  BP: 90/59 (01-24-23 @ 11:47)  RR: 17 (01-24-23 @ 11:47) (17 - 18)  SpO2: 99% (01-24-23 @ 11:47) (99% - 100%)      CAPILLARY BLOOD GLUCOSE      POCT Blood Glucose.: 90 mg/dL (24 Jan 2023 15:13)  POCT Blood Glucose.: 77 mg/dL (24 Jan 2023 12:56)  POCT Blood Glucose.: 92 mg/dL (24 Jan 2023 09:09)  POCT Blood Glucose.: 104 mg/dL (23 Jan 2023 23:58)  POCT Blood Glucose.: 99 mg/dL (23 Jan 2023 21:18)  POCT Blood Glucose.: 107 mg/dL (23 Jan 2023 18:31)  POCT Blood Glucose.: 32 mg/dL (23 Jan 2023 17:50)     ==================>> LAB AND IMAGING <<==================                        7.2    11.28 )-----------( 46       ( 24 Jan 2023 06:43 )             23.8        01-23    139  |  104  |  23  ----------------------------<  76  4.1   |  21<L>  |  0.60    Ca    8.5      23 Jan 2023 06:48      WBC count:   11.28 <<== ,  11.81 <<== ,  12.65 <<== ,  12.09 <<== ,  10.97 <<==   Hemoglobin:   7.2 <<==,  8.4 <<==,  8.6 <<==,  9.6 <<==,  7.4 <<==  platelets:  46 <==, 49 <==, 70 <==, 58 <==, 67 <==, 62 <==    ___________________________________________________________________________________  ===============>>  A S S E S S M E N T   A N D   P L A N <<===============  ------------------------------------------------------------------------------------------    · Assessment	  58 y/o woman with PMH Asthma, Anemia, Bladder cancer with metastases to the lungs, direct invasion to vagina, on chronic home O2 for comfort, Bradycardia / AF post PPM, Dyslipidemia, Hydronephrosis, R-nephrostomy tube, Liver cyst, Parathyroid tumor, Chronic pain on morphine sulfate at home ( not on hospice) presenting to the ED via EMS from oncologist office for worsening lethargy and intermittent central chest pain. Found to have profound hypercalcemia.     Problem/Plan - 1:  ·  Problem: Hypercalcemia of malignancy    improved > observe off iv fluids >> patient drinking PO fluids   -s/p calcitonin and Bisphosphonate administration    - trend CMP    supplement hypokalemia / hypomagnesemia as needed  keep K~4, Mag ~2    Problem/Plan - 2:  ·  Problem: Leukocytosis.  improved      post treatment empirically for possible PNA     ** Anemia of chronic disease  - PRBC as needed > likely one more unit soon / prior to DC    Problem/Plan - 3:  ·  Problem: Bladder cancer.       patient too weak and deconditioned, not candidate any treatments   patient deconditioned with baseline cachexia, severe protein calorie malnutrition, debility     palliative following: mainly for pain control >> signed off today !        patient not agreeable to hospice     there was supposed to be a family meeting today, unsure why it didn't happen !    Problem/Plan - 4:  ·  Problem: Prophylactic measure.   ·  Plan: -Diet: Regular  -DVT ppx: Lovenox qd.    patient very weak, deconditioned, debilitated... not ready for DC home    will continue to discuss with patient and team re options..  as above    Pt may be willing to consider to go to rehab ( in process) but patient today overall declined   psych and case management notes appreciated     --------------------------------------------  Case discussed with patient, RN, NP, pt's family at bedside ( uncle) ...  Education given on findings and plan of care  ___________________________  H. NIEVES Ochoa.  Pager: [phone]

## 2023-01-24 NOTE — BH CONSULTATION LIAISON ASSESSMENT NOTE - NSBHCHARTREVIEWINVESTIGATE_PSY_A_CORE FT
< from: 12 Lead ECG (01.23.23 @ 07:08) >      Ventricular Rate 84 BPM    Atrial Rate 84 BPM    P-R Interval 146 ms    QRS Duration 80 ms    Q-T Interval 368 ms    QTC Calculation(Bazett) 434 ms    P Axis 94 degrees    R Axis 88 degrees    T Axis 85 degrees    Diagnosis Line SINUS RHYTHM WITH OCCASIONAL PREMATURE VENTRICULAR COMPLEXES  NONSPECIFIC ST AND T WAVE ABNORMALITY  ABNORMAL ECG  WHEN COMPARED WITH ECG OF `1/23/23 05:21  Confirmed by MD RAJ, RAHEEM ([id number]) on 1/24/2023 8:55:27 AM    < end of copied text >

## 2023-01-24 NOTE — BH CONSULTATION LIAISON ASSESSMENT NOTE - CURRENT MEDICATION
MEDICATIONS  (STANDING):  chlorhexidine 2% Cloths 1 Application(s) Topical <User Schedule>  dextrose 5%. 1000 milliLiter(s) (40 mL/Hr) IV Continuous <Continuous>  enoxaparin Injectable 40 milliGRAM(s) SubCutaneous every 24 hours  midodrine. 5 milliGRAM(s) Oral three times a day  mirtazapine 15 milliGRAM(s) Oral at bedtime  morphine   Solution 5 milliGRAM(s) Oral every 4 hours  naloxone Injectable 0.4 milliGRAM(s) IV Push once  polyethylene glycol 3350 17 Gram(s) Oral daily  senna 2 Tablet(s) Oral at bedtime    MEDICATIONS  (PRN):  acetaminophen     Tablet .. 325 milliGRAM(s) Oral every 6 hours PRN Mild Pain (1 - 3)  aluminum hydroxide/magnesium hydroxide/simethicone Suspension 30 milliLiter(s) Oral every 4 hours PRN Dyspepsia  melatonin 3 milliGRAM(s) Oral at bedtime PRN Insomnia  morphine   Solution 5 milliGRAM(s) Oral every 4 hours PRN Severe Pain (7 - 10)  morphine   Solution 2.5 milliGRAM(s) Oral every 4 hours PRN Moderate Pain (4 - 6)  naloxone Injectable 0.1 milliGRAM(s) IV Push every 3 minutes PRN sedation or respiratory depression  ondansetron Injectable 4 milliGRAM(s) IV Push every 8 hours PRN Nausea and/or Vomiting

## 2023-01-24 NOTE — BH CONSULTATION LIAISON ASSESSMENT NOTE - NSBHCONSULTFOLLOWAFTERCARE_PSY_A_CORE FT
Pt may f/u at [facility] Adult Outpatient Psychiatry Department- [phone] or [facility] Outpatient Psychiatry- [phone]  [facility] Crisis clinic - [phone]

## 2023-01-25 NOTE — PROGRESS NOTE ADULT - SUBJECTIVE AND OBJECTIVE BOX
CARDIOLOGY     PROGRESS  NOTE   ________________________________________________    CHIEF COMPLAINT:Patient is a 57y old  Female who presents with a chief complaint of chest pain (24 Jan 2023 17:41)  not feeling well/ pain all over  	  REVIEW OF SYSTEMS:  CONSTITUTIONAL: No fever, weight loss, or fatigue  EYES: No eye pain, visual disturbances, or discharge  ENT:  No difficulty hearing, tinnitus, vertigo; No sinus or throat pain  NECK: No pain or stiffness  RESPIRATORY: No cough, wheezing, chills or hemoptysis; No Shortness of Breath  CARDIOVASCULAR: No chest pain, palpitations, passing out, dizziness, or leg swelling  GASTROINTESTINAL: No abdominal or epigastric pain. No nausea, vomiting, or hematemesis; No diarrhea or constipation. No melena or hematochezia.  GENITOURINARY: No dysuria, frequency, hematuria, or incontinence  NEUROLOGICAL: No headaches, memory loss, loss of strength, numbness, or tremors  SKIN: No itching, burning, rashes, or lesions   LYMPH Nodes: No enlarged glands  ENDOCRINE: No heat or cold intolerance; No hair loss  MUSCULOSKELETAL: No joint pain or swelling; No muscle, back, or extremity pain  PSYCHIATRIC: No depression, anxiety, mood swings, or difficulty sleeping  HEME/LYMPH: No easy bruising, or bleeding gums  ALLERGY AND IMMUNOLOGIC: No hives or eczema	    [ ] All others negative	  [x ] Unable to obtain    PHYSICAL EXAM:  T(C): 36.4 (01-25-23 @ 03:41), Max: 36.9 (01-24-23 @ 11:47)  HR: 81 (01-25-23 @ 05:05) (81 - 107)  BP: 89/57 (01-25-23 @ 05:05) (82/56 - 90/59)  RR: 18 (01-25-23 @ 03:41) (17 - 18)  SpO2: 100% (01-25-23 @ 03:41) (99% - 100%)  Wt(kg): --  I&O's Summary    23 Jan 2023 07:01  -  24 Jan 2023 07:00  --------------------------------------------------------  IN: 380 mL / OUT: 720 mL / NET: -340 mL    24 Jan 2023 07:01  -  25 Jan 2023 06:43  --------------------------------------------------------  IN: 480 mL / OUT: 550 mL / NET: -70 mL        Appearance: cachectic	  HEENT:   Normal oral mucosa, PERRL, EOMI	  Lymphatic: No lymphadenopathy  Cardiovascular: Normal S1 S2, No JVD, + murmurs, No edema  Respiratory: Lungs clear to auscultation	  Psychiatry: depress  Gastrointestinal:  Soft, Non-tender, + BS	  Skin: No rashes, No ecchymoses, No cyanosis	  Neurologic: Non-focal  Extremities: Normal range of motion, No clubbing, cyanosis or edema  Vascular: Peripheral pulses palpable 2+ bilaterally    MEDICATIONS  (STANDING):  chlorhexidine 2% Cloths 1 Application(s) Topical <User Schedule>  dextrose 5%. 1000 milliLiter(s) (40 mL/Hr) IV Continuous <Continuous>  enoxaparin Injectable 40 milliGRAM(s) SubCutaneous every 24 hours  midodrine. 5 milliGRAM(s) Oral three times a day  mirtazapine 15 milliGRAM(s) Oral at bedtime  morphine   Solution 5 milliGRAM(s) Oral every 4 hours  naloxone Injectable 0.4 milliGRAM(s) IV Push once  polyethylene glycol 3350 17 Gram(s) Oral daily  senna 2 Tablet(s) Oral at bedtime      TELEMETRY: 	    ECG:  	  RADIOLOGY:  OTHER: 	  	  LABS:	 	    CARDIAC MARKERS:                                6.0    10.06 )-----------( 40       ( 25 Jan 2023 04:36 )             19.3     01-23    139  |  104  |  23  ----------------------------<  76  4.1   |  21<L>  |  0.60    Ca    8.5      23 Jan 2023 06:48      proBNP: Serum Pro-Brain Natriuretic Peptide: 510 pg/mL (01-06 @ 19:43)    Lipid Profile:   HgA1c:   TSH:         Assessment and plan  ---------------------------  Bradycardia, Hypertension, Dyslipidemia, Hydronephrosis, R-nephrostomy tube, Liver cyst, Parathyroid tumor, Chronic pain on morphine 2.5 (last took this morning) and 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 Patient reported the pain is sharp well localized and occasionally pleuritic. Has chronic cough with bloody or brown sputum. Reports feeling dehydrated. Has not had chemo since 5/2022. Patient denied abdominal pain, N/V/D, fever, urinary symptoms, rash  pt with metastatic bladder ca / onc eval  s/p ppm , sss  chest pain doubt cardiac ?metastatic ca bony mets  dvt prophylaxis  severe protein deficiency, nutrition eval   hydration  psych eval start Remeron 15 mg po qhs  pain management  PE has been ruled out several admissions  dvt prophylaxis  nutrition consult severe protein deficiency appreciated  check labs, hypercalcemia, increase fluid,  endocrine eval noted  ?abx check urin  oncology eval appreciated, awaiting ct chest abdomen and pelvis  physical therapy  replete K, keep K>4  doubt pt is a candidate for chemo being so malnourished   replete lytes  ppm was recently checked  hypotension decrease morphine dose may need to add midodrine on 5 mg tid  may decrease morphine to three times per day, bp better controlled, pain management  decrease hgb transfuse keep hgb>8  discussed with family re palliative care/ hospice care  hypotensive today with decrease hgb  transfuse one unit of prbc, ?cause of anemia   decrease Lovenox dose

## 2023-01-25 NOTE — PROVIDER CONTACT NOTE (CRITICAL VALUE NOTIFICATION) - BACKGROUND
Metastatic bladder CA
Bladder cancer with mets. Hypercalcemia.
admitted with anemia, metastatic bladder CA
bladder ca with mets, On home oxygen, hypercalcemia, Leukocytosis

## 2023-01-25 NOTE — PROGRESS NOTE ADULT - ASSESSMENT
_________________________________________________________________________________________  ========>>  M E D I C A L   A T T E N D I N G    F O L L O W  U P  N O T E  <<=========  -----------------------------------------------------------------------------------------------------    - Patient seen and examined by me earlier today.   - In summary,  JEF HOSUE is a 57y year old woman admitted with chest discomfort   - Patient today overall doing poorly, comfortable, eating poorly- fairly per son at bedside, not talking to me much, weak        ==================>> REVIEW OF SYSTEM <<=================    GEN: no fever, no chills, chronic pain in back / flank  and lower back / sacral area   RESP: no SOB, no cough, no sputum  CVS: no chest pain reported   GI: no abdominal pain, no nausea  : no dysuria, no frequency, chronic hematuria and vaginal bleeding on and off   Neuro: no headache,  dizziness with movement   Derm : no itching, no rash    ==================>> PHYSICAL EXAM <<=================    GEN: A&O X 3 , NAD , comfortable, pleasant, calm , in bed , emaciated   HEENT: NCAT, PERRL, MMM, hearing intact, temporal wasting   Neck: supple , no JVD appreciated  CVS: S1S2 , regular , No M/R/G appreciated  PULM: CTA B/L,  no W/R/R appreciated  ABD.: soft. non tender, non distended  Extrem: intact pulses , + B/L Le 2+ edema , cachectic . + nephrostomy tube with urine   PSYCH : flat affect, not anxious                             ( Note written / Date of service 01-25-23 )    ==================>> MEDICATIONS <<====================    chlorhexidine 2% Cloths 1 Application(s) Topical <User Schedule>  dextrose 5%. 1000 milliLiter(s) IV Continuous <Continuous>  enoxaparin Injectable 30 milliGRAM(s) SubCutaneous every 24 hours  midodrine. 5 milliGRAM(s) Oral three times a day  mirtazapine 15 milliGRAM(s) Oral at bedtime  morphine   Solution 5 milliGRAM(s) Oral every 4 hours  naloxone Injectable 0.4 milliGRAM(s) IV Push once  polyethylene glycol 3350 17 Gram(s) Oral daily  senna 2 Tablet(s) Oral at bedtime    MEDICATIONS  (PRN):  acetaminophen     Tablet .. 325 milliGRAM(s) Oral every 6 hours PRN Mild Pain (1 - 3)  aluminum hydroxide/magnesium hydroxide/simethicone Suspension 30 milliLiter(s) Oral every 4 hours PRN Dyspepsia  melatonin 3 milliGRAM(s) Oral at bedtime PRN Insomnia  morphine   Solution 2.5 milliGRAM(s) Oral every 4 hours PRN Moderate Pain (4 - 6)  morphine   Solution 5 milliGRAM(s) Oral every 4 hours PRN Severe Pain (7 - 10)  naloxone Injectable 0.1 milliGRAM(s) IV Push every 3 minutes PRN sedation or respiratory depression  ondansetron Injectable 4 milliGRAM(s) IV Push every 8 hours PRN Nausea and/or Vomiting    ___________  Active diet:  Diet, Soft and Bite Sized:   Nikita(7 Gm Arginine/7 Gm Glut/1.2 Gm HMB     Qty per Day:  2  Supplement Feeding Modality:  Oral  Ensure Plus High Protein Cans or Servings Per Day:  3       Frequency:  Daily  ___________________    ==================>> VITAL SIGNS <<==================    Vital Signs Last 24 HrsT(C): 36.9 (01-25-23 @ 12:25)  T(F): 98.4 (01-25-23 @ 12:25), Max: 98.4 (01-25-23 @ 09:45)  HR: 86 (01-25-23 @ 14:35) (78 - 107)  BP: 113/64 (01-25-23 @ 14:35)  RR: 16 (01-25-23 @ 14:35) (16 - 19)  SpO2: 98% (01-25-23 @ 14:35) (98% - 100%)      CAPILLARY BLOOD GLUCOSE  POCT Blood Glucose.: 78 mg/dL (25 Jan 2023 12:59)  POCT Blood Glucose.: 97 mg/dL (25 Jan 2023 09:17)  POCT Blood Glucose.: 67 mg/dL (25 Jan 2023 08:38)  POCT Blood Glucose.: 69 mg/dL (25 Jan 2023 08:36)  POCT Blood Glucose.: 116 mg/dL (24 Jan 2023 23:05)  POCT Blood Glucose.: 77 mg/dL (24 Jan 2023 19:58)     ==================>> LAB AND IMAGING <<==================                        6.0    10.06 )-----------( 40       ( 25 Jan 2023 04:36 )             19.3        WBC count:   10.06 <<== ,  11.28 <<== ,  11.81 <<== ,  12.65 <<== ,  12.09 <<==   Hemoglobin:   6.0 <<==,  7.2 <<==,  8.4 <<==,  8.6 <<==,  9.6 <<==  platelets:  40 <==, 46 <==, 49 <==, 70 <==, 58 <==, 67 <==    ___________________________________________________________________________________  ===============>>  A S S E S S M E N T   A N D   P L A N <<===============  ------------------------------------------------------------------------------------------    · Assessment	  56 y/o woman with PMH Asthma, Anemia, Bladder cancer with metastases to the lungs, direct invasion to vagina, on chronic home O2 for comfort, Bradycardia / AF post PPM, Dyslipidemia, Hydronephrosis, R-nephrostomy tube, Liver cyst, Parathyroid tumor, Chronic pain on morphine sulfate at home ( not on hospice) presenting to the ED via EMS from oncologist office for worsening lethargy and intermittent central chest pain. Found to have profound hypercalcemia.     Problem/Plan - 1:  ·  Problem: Hypercalcemia of malignancy    improved > observe off iv fluids >> patient drinking PO fluids   -s/p calcitonin and Bisphosphonate administration    - trend CMP    supplement hypokalemia / hypomagnesemia as needed  keep K~4, Mag ~2    Problem/Plan - 2:  ·  Problem: Leukocytosis.  improved      post treatment empirically for possible PNA     ** Anemia of chronic disease  - PRBC as needed > one more unit today    Problem/Plan - 3:  ·  Problem: Bladder cancer.       patient too weak and deconditioned, not candidate any treatments   patient deconditioned with baseline cachexia, severe protein calorie malnutrition, debility     palliative following: mainly for pain control >> signed off today !        patient not agreeable to hospice     there was supposed to be a family meeting today, unsure why it didn't happen !    Problem/Plan - 4:  ·  Problem: Prophylactic measure    ·  Plan: -Diet: Regular  -DVT ppx: Lovenox qd.    patient very weak, deconditioned, debilitated... not ready for DC home    patient has taken a turn for the worse..                 Pt may be willing to consider to go to rehab ( in process) but patient today overall declined   psych and case management notes appreciated     --------------------------------------------  Case discussed with patient, RN, NP, pt's family at bedside ( uncle) ...  Education given on findings and plan of care  ___________________________  H. NIEVES Ochoa.  Pager: [phone]       _________________________________________________________________________________________  ========>>  M E D I C A L   A T T E N D I N G    F O L L O W  U P  N O T E  <<=========  -----------------------------------------------------------------------------------------------------    - Patient seen and examined by me earlier today.   - In summary,  JEF HOUSE is a 57y year old woman admitted with chest discomfort   - Patient today overall doing poorly, comfortable, eating poorly- fairly per son at bedside, not talking to me much, weak        ==================>> REVIEW OF SYSTEM <<=================    GEN: no fever, no chills, chronic pain in back / flank  and lower back / sacral area   RESP: no SOB, no cough, no sputum  CVS: no chest pain reported   GI: no abdominal pain, no nausea  : no dysuria, no frequency, chronic hematuria and vaginal bleeding on and off   Neuro: no headache,  dizziness with movement   Derm : no itching, no rash    ==================>> PHYSICAL EXAM <<=================    GEN: A&O X 3 , NAD , comfortable, pleasant, calm , in bed , emaciated   HEENT: NCAT, PERRL, MMM, hearing intact, temporal wasting   Neck: supple , no JVD appreciated  CVS: S1S2 , regular , No M/R/G appreciated  PULM: CTA B/L,  no W/R/R appreciated  ABD.: soft. non tender, non distended  Extrem: intact pulses , + B/L Le 2+ edema , cachectic . + nephrostomy tube with urine   PSYCH : flat affect, not anxious                             ( Note written / Date of service 01-25-23 )    ==================>> MEDICATIONS <<====================    chlorhexidine 2% Cloths 1 Application(s) Topical <User Schedule>  dextrose 5%. 1000 milliLiter(s) IV Continuous <Continuous>  enoxaparin Injectable 30 milliGRAM(s) SubCutaneous every 24 hours  midodrine. 5 milliGRAM(s) Oral three times a day  mirtazapine 15 milliGRAM(s) Oral at bedtime  morphine   Solution 5 milliGRAM(s) Oral every 4 hours  naloxone Injectable 0.4 milliGRAM(s) IV Push once  polyethylene glycol 3350 17 Gram(s) Oral daily  senna 2 Tablet(s) Oral at bedtime    MEDICATIONS  (PRN):  acetaminophen     Tablet .. 325 milliGRAM(s) Oral every 6 hours PRN Mild Pain (1 - 3)  aluminum hydroxide/magnesium hydroxide/simethicone Suspension 30 milliLiter(s) Oral every 4 hours PRN Dyspepsia  melatonin 3 milliGRAM(s) Oral at bedtime PRN Insomnia  morphine   Solution 2.5 milliGRAM(s) Oral every 4 hours PRN Moderate Pain (4 - 6)  morphine   Solution 5 milliGRAM(s) Oral every 4 hours PRN Severe Pain (7 - 10)  naloxone Injectable 0.1 milliGRAM(s) IV Push every 3 minutes PRN sedation or respiratory depression  ondansetron Injectable 4 milliGRAM(s) IV Push every 8 hours PRN Nausea and/or Vomiting    ___________  Active diet:  Diet, Soft and Bite Sized:   Nikita(7 Gm Arginine/7 Gm Glut/1.2 Gm HMB     Qty per Day:  2  Supplement Feeding Modality:  Oral  Ensure Plus High Protein Cans or Servings Per Day:  3       Frequency:  Daily  ___________________    ==================>> VITAL SIGNS <<==================    Vital Signs Last 24 HrsT(C): 36.9 (01-25-23 @ 12:25)  T(F): 98.4 (01-25-23 @ 12:25), Max: 98.4 (01-25-23 @ 09:45)  HR: 86 (01-25-23 @ 14:35) (78 - 107)  BP: 113/64 (01-25-23 @ 14:35)  RR: 16 (01-25-23 @ 14:35) (16 - 19)  SpO2: 98% (01-25-23 @ 14:35) (98% - 100%)      CAPILLARY BLOOD GLUCOSE  POCT Blood Glucose.: 78 mg/dL (25 Jan 2023 12:59)  POCT Blood Glucose.: 97 mg/dL (25 Jan 2023 09:17)  POCT Blood Glucose.: 67 mg/dL (25 Jan 2023 08:38)  POCT Blood Glucose.: 69 mg/dL (25 Jan 2023 08:36)  POCT Blood Glucose.: 116 mg/dL (24 Jan 2023 23:05)  POCT Blood Glucose.: 77 mg/dL (24 Jan 2023 19:58)     ==================>> LAB AND IMAGING <<==================                        6.0    10.06 )-----------( 40       ( 25 Jan 2023 04:36 )             19.3        WBC count:   10.06 <<== ,  11.28 <<== ,  11.81 <<== ,  12.65 <<== ,  12.09 <<==   Hemoglobin:   6.0 <<==,  7.2 <<==,  8.4 <<==,  8.6 <<==,  9.6 <<==  platelets:  40 <==, 46 <==, 49 <==, 70 <==, 58 <==, 67 <==    ___________________________________________________________________________________  ===============>>  A S S E S S M E N T   A N D   P L A N <<===============  ------------------------------------------------------------------------------------------    · Assessment	  58 y/o woman with PMH Asthma, Anemia, Bladder cancer with metastases to the lungs, direct invasion to vagina, on chronic home O2 for comfort, Bradycardia / AF post PPM, Dyslipidemia, Hydronephrosis, R-nephrostomy tube, Liver cyst, Parathyroid tumor, Chronic pain on morphine sulfate at home ( not on hospice) presenting to the ED via EMS from oncologist office for worsening lethargy and intermittent central chest pain. Found to have profound hypercalcemia.     Problem/Plan - 1:  ·  Problem: Hypercalcemia of malignancy    improved > observe off iv fluids >> patient drinking PO fluids   -s/p calcitonin and Bisphosphonate administration    - trend CMP    supplement hypokalemia / hypomagnesemia as needed  keep K~4, Mag ~2    Problem/Plan - 2:  ·  Problem: Leukocytosis.  improved      post treatment empirically for possible PNA     ** Anemia of chronic disease  - PRBC as needed > one more unit today    Problem/Plan - 3:  ·  Problem: Bladder cancer.       patient too weak and deconditioned, not candidate any treatments   patient deconditioned with baseline cachexia, severe protein calorie malnutrition, debility     palliative following: mainly for pain control >> signed off today !        patient not agreeable to hospice     there was supposed to be a family meeting today, unsure why it didn't happen !    Problem/Plan - 4:  ·  Problem: Prophylactic measure    ·  Plan: -Diet: Regular  -DVT ppx: Lovenox qd.    patient very weak, deconditioned, debilitated... not ready for DC home    patient has taken a turn for the worse..     doubtful patient would be able to participate in any rehab...      psych and case management notes appreciated      I had a long discussion withpt's sister / HCP about pt's status, prognosis, options and DNR status.. all questions answered.          she will discuss with the rest of the family and make a decision about DNR status and likely transfer to an inpatient hospice facility ( home hospice is not an option per sister )     --------------------------------------------  Case discussed with patient, RN, NP, pt's son at bedside, and as above  ...  Education given on findings and plan of care  ___________________________  H. NIEVES Ochoa.  Pager: [phone]

## 2023-01-25 NOTE — PROVIDER CONTACT NOTE (HYPOGLYCEMIA EVENT) - NS PROVIDER CONTACT BACKGROUND-HYPO
Age: 57y    Gender: Female    POCT Blood Glucose:  76 mg/dL (01-25-23 @ 19:30)  64 mg/dL (01-25-23 @ 19:09)  51 mg/dL (01-25-23 @ 18:44)  54 mg/dL (01-25-23 @ 18:40)  78 mg/dL (01-25-23 @ 12:59)  97 mg/dL (01-25-23 @ 09:17)  67 mg/dL (01-25-23 @ 08:38)  69 mg/dL (01-25-23 @ 08:36)      eMAR:

## 2023-01-25 NOTE — CHART NOTE - NSCHARTNOTEFT_GEN_A_CORE
Notified by RN, Pt hypotensive this morning to 80's systolic. Pt seen and evaluated at bedside. Pt reporting anxiety given multiple medical conditions. She also reports pain in her LUE which has been ongoing otherwise no further complaints.     Plan:   Hypotension  >Draw AM labs early (CBC, type and screen)  >Midodrine 5mg given early  >Normal saline 250 cc bolus x1 ordered  >If Pt remains hypotensive despite fluid/blood resuscitation consider infectious w/u or other causes of hypotension  >Will transfuse for Hgb <7  >Will endorse to AM team, attending to follow    Kellie Silveira PA-C  Department of Medicine    Addendum: CBC resulted noted to be 6.0 from 7.2. Pt remains hypotensive, however not far from baseline and Pt remains asymptomatic. Will order for 1 u PRBC (unit to be split given Pt only with 22 gauge).

## 2023-01-26 NOTE — CONSULT NOTE ADULT - SUBJECTIVE AND OBJECTIVE BOX
CHIEF COMPLAINT:    HPI:  57F metastatic cancer, admitted to hospital for hypocalcemia and PNA. She has metastatic bladder cancer and heme onc not offering any more treatments. Has large bladder tumor, mets in liver, kidney, lungs. Very poor quality of life.   RRT called today, patient found to be hypotensive likely 2/2 possibly internal bleeding (patient Hgb ~6 this admission, received 1U PRBC with response). IV team called to bedside, placed two 20g IVs. CBC and CMP drawn. Started D5+LR and albumin, gave additional 5mg midodrine and recommended increase dose to 10mg TID.    MICU consulted for hypotension. At bedside, patient is cachectic appearing, lethargic, and family at bedside.     PAST MEDICAL & SURGICAL HISTORY:  Anemia      Asthma      HLD (hyperlipidemia)      Pacemaker  [employer]      Bladder cancer      HTN (hypertension)      Parathyroid tumor      Liver cyst      Hydronephrosis  has right Nephrostomy tube - dressing intact      Bradycardia      History of renal calculi      Birthmark      H/O myomectomy      Presence of cardiac pacemaker      H/O hydronephrosis  s/p Right Perc nephrostomy tube placement 02/2021, exchange 06/2021          FAMILY HISTORY:  Family history of prostate cancer (Father)    Family history of CHF (congestive heart failure) (Father)    Family history of atrial fibrillation (Father)      Allergies    No Known Allergies    Intolerances    lactose (Unknown)      HOME MEDICATIONS:      OBJECTIVE:  ICU Vital Signs Last 24 Hrs  T(C): 36.7 (26 Jan 2023 12:48), Max: 37.1 (25 Jan 2023 21:25)  T(F): 98.1 (26 Jan 2023 12:48), Max: 98.8 (25 Jan 2023 21:25)  HR: 66 (26 Jan 2023 13:43) (66 - 116)  BP: 82/51 (26 Jan 2023 13:43) (74/49 - 102/67)  BP(mean): --  ABP: --  ABP(mean): --  RR: 18 (26 Jan 2023 12:48) (18 - 18)  SpO2: 98% (26 Jan 2023 12:48) (97% - 98%)    O2 Parameters below as of 26 Jan 2023 12:48  Patient On (Oxygen Delivery Method): nasal cannula  O2 Flow (L/min): 2            01-25 @ 07:01  -  01-26 @ 07:00  --------------------------------------------------------  IN: 280 mL / OUT: 200 mL / NET: 80 mL    01-26 @ 07:01  - 01-26 @ 14:53  --------------------------------------------------------  IN: 40 mL / OUT: 0 mL / NET: 40 mL      CAPILLARY BLOOD GLUCOSE      POCT Blood Glucose.: 158 mg/dL (26 Jan 2023 12:46)      PHYSICAL EXAM:  General: cachectic, lethargic, frail   Respiratory: on nasal cannula oxygen   Extremities: thin, muscle wasting  Neurological: lethargic and unable to answer questions     HOSPITAL MEDICATIONS:  MEDICATIONS  (STANDING):  acetaminophen   IVPB .. 550 milliGRAM(s) IV Intermittent once  chlorhexidine 2% Cloths 1 Application(s) Topical <User Schedule>  dextrose 5% + lactated ringers. 1000 milliLiter(s) (1000 mL/Hr) IV Continuous <Continuous>  dextrose 5%. 1000 milliLiter(s) (40 mL/Hr) IV Continuous <Continuous>  dextrose 50% Injectable 25 Gram(s) IV Push once  dextrose 50% Injectable 12.5 Gram(s) IV Push once  dextrose 50% Injectable 25 Gram(s) IV Push once  dextrose Oral Gel 15 Gram(s) Oral once  enoxaparin Injectable 30 milliGRAM(s) SubCutaneous every 24 hours  glucagon  Injectable 1 milliGRAM(s) IntraMuscular once  midodrine. 10 milliGRAM(s) Oral three times a day  mirtazapine 15 milliGRAM(s) Oral at bedtime  morphine   Solution 5 milliGRAM(s) Oral every 4 hours  naloxone Injectable 0.4 milliGRAM(s) IV Push once  polyethylene glycol 3350 17 Gram(s) Oral daily  senna 2 Tablet(s) Oral at bedtime    MEDICATIONS  (PRN):  acetaminophen     Tablet .. 325 milliGRAM(s) Oral every 6 hours PRN Mild Pain (1 - 3)  aluminum hydroxide/magnesium hydroxide/simethicone Suspension 30 milliLiter(s) Oral every 4 hours PRN Dyspepsia  melatonin 3 milliGRAM(s) Oral at bedtime PRN Insomnia  morphine   Solution 2.5 milliGRAM(s) Oral every 4 hours PRN Moderate Pain (4 - 6)  morphine   Solution 5 milliGRAM(s) Oral every 4 hours PRN Severe Pain (7 - 10)  naloxone Injectable 0.1 milliGRAM(s) IV Push every 3 minutes PRN sedation or respiratory depression  ondansetron Injectable 4 milliGRAM(s) IV Push every 8 hours PRN Nausea and/or Vomiting      LABS:                        8.1    11.18 )-----------( 36       ( 26 Jan 2023 10:33 )             24.6     01-26    133<L>  |  99  |  34<H>  ----------------------------<  59<L>  4.5   |  20<L>  |  0.83    Ca    8.3<L>      26 Jan 2023 10:33  Phos  3.1     01-26  Mg     1.8     01-26    TPro  5.1<L>  /  Alb  2.0<L>  /  TBili  2.2<H>  /  DBili  x   /  AST  73<H>  /  ALT  55<H>  /  AlkPhos  586<H>  01-26              MICROBIOLOGY:     RADIOLOGY:  [ ] Reviewed and interpreted by me    EKG:

## 2023-01-26 NOTE — CHART NOTE - NSCHARTNOTEFT_GEN_A_CORE
Discussion had with Family at bedside - Sister Giovanna Bell health care proxy and 3 sons and 1 daughter at bedside   - Explained to family that patient is lethargic unable to take po intake   - All family agree that they want everything done , Family said she would not want DNR   - Patient will remain Full code   - Dr. Trujillo update

## 2023-01-26 NOTE — GOALS OF CARE CONVERSATION - ADVANCED CARE PLANNING - CONVERSATION DETAILS
MICU consulted by primary team for persistent hypotension and altered mental status in the setting of advanced malignancy. Met with patient's family, including her children and her sister Giovanna, her HCP. I inquired into the family's understanding of the current situation. Giovanna acknowledged that she was aware of the patient's malignancy and prolonged hospital course. She stated that previously the patient had stated that she wanted all medical interventions, including resuscitation. I asked about her functional status when she stated these wishes, and Giovanna replied that at the time the patient was home and able to complete ADLs. I explained the patient's current clinical status, which included her stage IV disease and that no further treatment for malignancy was being offered. I explained the process of resuscitation, intubation, and mechanical ventilation. I expressed to her that given the patient's malignancy, these interventions would ultimately not change the patient's clinical outcome and would cause harm without benefit. Given this, I recommended that we pursue a symptom-based approach to care given her current status. Giovanna verbalized understanding of this. I offered her time to discuss with her family, which she accepted.    I returned to bedside and spoke with Giovanna again. At that time, she stated that they had decided against aggressive measures and resuscitation given the clinical status of the patient. They stated that she had been through enough and they wanted her to be comfortable. All questions were answered and emotional support provided. Patient will be DNR/DNI with focus on management of symptoms.

## 2023-01-26 NOTE — CONSULT NOTE ADULT - ASSESSMENT
56 y/o woman with PMH Asthma, Anemia, Bladder cancer with metastases to the lungs, direct invasion to vagina, on chronic home O2 for comfort, Bradycardia / AF post PPM, Dyslipidemia, Hydronephrosis, R-nephrostomy tube, Liver cyst, Parathyroid tumor, Chronic pain on morphine sulfate at home ( not on hospice) presenting to the ED via EMS from oncologist office for worsening lethargy and intermittent central chest pain. Found to have profound hypercalcemia.     Currently lethargic, MICU consulted for hypotension. Received 1L IVF at earlier rapid and now on maintenance.     GOC:   Discussed with patient's health care proxy regarding patient's end stage cancer and that she is in process of dying. This is not reversible with aggressive measures. Aggressive measure such as CPR, intubation, etc will cause further suffering rather than any treatment benefits. Patient's HCP asked to discuss with rest of family and then will let us know. Will follow up.     Recommendations:    58 y/o woman with PMH Asthma, Anemia, Bladder cancer with metastases to the lungs, direct invasion to vagina, on chronic home O2 for comfort, Bradycardia / AF post PPM, Dyslipidemia, Hydronephrosis, R-nephrostomy tube, Liver cyst, Parathyroid tumor, Chronic pain on morphine sulfate at home ( not on hospice) presenting to the ED via EMS from oncologist office for worsening lethargy and intermittent central chest pain. Found to have profound hypercalcemia.     Currently lethargic, MICU consulted for hypotension. Received 1L IVF at earlier rapid and now on maintenance.     Recommendations:   Patient is now DNR, DNI. Please refer to GOC note for further details.   No MICU needs.  58 y/o woman with PMH Asthma, Anemia, Bladder cancer with metastases to the lungs, direct invasion to vagina, on chronic home O2 for comfort, Bradycardia / AF post PPM, Dyslipidemia, Hydronephrosis, R-nephrostomy tube, Liver cyst, Parathyroid tumor, Chronic pain on morphine sulfate at home ( not on hospice) presenting to the ED via EMS from oncologist office for worsening lethargy and intermittent central chest pain. Found to have profound hypercalcemia.     Currently lethargic, MICU consulted for hypotension. Received 1L IVF at earlier rapid and now on maintenance.     Recommendations:   Patient is now DNR, DNI. Please refer to GOC note for further details.   No MICU needs.   Recommend speaking to palliative regarding hospice or PCU transfer if family amenable.

## 2023-01-26 NOTE — PROGRESS NOTE ADULT - SUBJECTIVE AND OBJECTIVE BOX
CARDIOLOGY     PROGRESS  NOTE   ________________________________________________    CHIEF COMPLAINT:Patient is a 57y old  Female who presents with a chief complaint of chest pain (25 Jan 2023 16:58)  pain all over  	  REVIEW OF SYSTEMS:  CONSTITUTIONAL: No fever, weight loss, or fatigue  EYES: No eye pain, visual disturbances, or discharge  ENT:  No difficulty hearing, tinnitus, vertigo; No sinus or throat pain  NECK: No pain or stiffness  RESPIRATORY: No cough, wheezing, chills or hemoptysis; No Shortness of Breath  CARDIOVASCULAR: No chest pain, palpitations, passing out, dizziness, or leg swelling  GASTROINTESTINAL: No abdominal or epigastric pain. No nausea, vomiting, or hematemesis; No diarrhea or constipation. No melena or hematochezia.  GENITOURINARY: No dysuria, frequency, hematuria, or incontinence  NEUROLOGICAL: No headaches, memory loss, loss of strength, numbness, or tremors  SKIN: No itching, burning, rashes, or lesions   LYMPH Nodes: No enlarged glands  ENDOCRINE: No heat or cold intolerance; No hair loss  MUSCULOSKELETAL: No joint pain or swelling; No muscle, back, or extremity pain  PSYCHIATRIC: No depression, anxiety, mood swings, or difficulty sleeping  HEME/LYMPH: No easy bruising, or bleeding gums  ALLERGY AND IMMUNOLOGIC: No hives or eczema	    [ ] All others negative	  [x ] Unable to obtain    PHYSICAL EXAM:  T(C): 36.8 (01-26-23 @ 02:20), Max: 37.1 (01-25-23 @ 21:25)  HR: 93 (01-26-23 @ 02:20) (78 - 111)  BP: 88/57 (01-26-23 @ 02:20) (88/56 - 113/64)  RR: 18 (01-26-23 @ 02:20) (16 - 19)  SpO2: 98% (01-26-23 @ 02:20) (97% - 100%)  Wt(kg): --  I&O's Summary    24 Jan 2023 07:01  -  25 Jan 2023 07:00  --------------------------------------------------------  IN: 960 mL / OUT: 550 mL / NET: 410 mL    25 Jan 2023 07:01  -  26 Jan 2023 06:00  --------------------------------------------------------  IN: 280 mL / OUT: 200 mL / NET: 80 mL        Appearance: Normal	  HEENT:   Normal oral mucosa, PERRL, EOMI	  Lymphatic: No lymphadenopathy  Cardiovascular: Normal S1 S2, No JVD, + murmurs, No edema  Respiratory: rhonchi  Psychiatry: A & O x 3, Mood & affect appropriate  Gastrointestinal:  Soft, Non-tender, + BS	  Skin: No rashes, No ecchymoses, No cyanosis	  Neurologic: Non-focal  Extremities: Normal range of motion, No clubbing, cyanosis or edema  Vascular: Peripheral pulses palpable 2+ bilaterally    MEDICATIONS  (STANDING):  chlorhexidine 2% Cloths 1 Application(s) Topical <User Schedule>  dextrose 5%. 1000 milliLiter(s) (40 mL/Hr) IV Continuous <Continuous>  dextrose 50% Injectable 25 Gram(s) IV Push once  dextrose 50% Injectable 12.5 Gram(s) IV Push once  dextrose 50% Injectable 25 Gram(s) IV Push once  dextrose Oral Gel 15 Gram(s) Oral once  enoxaparin Injectable 30 milliGRAM(s) SubCutaneous every 24 hours  glucagon  Injectable 1 milliGRAM(s) IntraMuscular once  midodrine. 5 milliGRAM(s) Oral three times a day  mirtazapine 15 milliGRAM(s) Oral at bedtime  morphine   Solution 5 milliGRAM(s) Oral every 4 hours  naloxone Injectable 0.4 milliGRAM(s) IV Push once  polyethylene glycol 3350 17 Gram(s) Oral daily  senna 2 Tablet(s) Oral at bedtime      TELEMETRY: 	    ECG:  	  RADIOLOGY:  OTHER: 	  	  LABS:	 	    CARDIAC MARKERS:                                8.7    8.55  )-----------( 38       ( 25 Jan 2023 16:58 )             28.0           proBNP: Serum Pro-Brain Natriuretic Peptide: 510 pg/mL (01-06 @ 19:43)    Lipid Profile:   HgA1c:   TSH:     Pt not requiring morphine sol prn.  Appears stable to Morphine 5mg poq4h ATC.  Prn's available as ordered.  Palliative care will sign off.  Please reconsult if necessary.    Assessment and plan  ---------------------------  Bradycardia, Hypertension, Dyslipidemia, Hydronephrosis, R-nephrostomy tube, Liver cyst, Parathyroid tumor, Chronic pain on morphine 2.5 (last took this morning) and 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 Patient reported the pain is sharp well localized and occasionally pleuritic. Has chronic cough with bloody or brown sputum. Reports feeling dehydrated. Has not had chemo since 5/2022. Patient denied abdominal pain, N/V/D, fever, urinary symptoms, rash  pt with metastatic bladder ca / onc eval  s/p ppm , sss  chest pain doubt cardiac ?metastatic ca bony mets  dvt prophylaxis  severe protein deficiency, nutrition eval   hydration  psych eval start Remeron 15 mg po qhs  pain management  PE has been ruled out several admissions  dvt prophylaxis  nutrition consult severe protein deficiency appreciated  check labs, hypercalcemia, increase fluid,  endocrine eval noted  ?abx check urin  oncology eval appreciated, awaiting ct chest abdomen and pelvis  physical therapy  replete K, keep K>4  doubt pt is a candidate for chemo being so malnourished   replete lytes  ppm was recently checked  hypotension decrease morphine dose may need to add midodrine on 5 mg tid  may decrease morphine to three times per day, bp better controlled, pain management  decrease hgb transfuse keep hgb>8  discussed with family re palliative care/ hospice care pt can not take care of herself  hypotensive on midodrine  decrease Lovenox dose

## 2023-01-26 NOTE — CHART NOTE - NSCHARTNOTEFT_GEN_A_CORE
Patient noted with hypoglycemia on sunrise, patient seen and examined - denies dizziness   - RN followed protocol and activated Glucose gel - patient has no IV access   - Midline ordered   - RN to continue hypoglycemia protocol Patient noted with hypoglycemia on sunrise, patient seen and examined - denies dizziness   - RN followed protocol and activated Glucose gel - patient has no IV access   - Midline ordered   - RN to continue hypoglycemia protocol    Addendum : Patient RRT for Hypotension and hypoglycemia                      IV team called stat to place IV, patient now receiving IVF / Albumin                      Discussed w Dr. Ochoa - he will discuss with family                      Patient remains on the unit

## 2023-01-26 NOTE — RAPID RESPONSE TEAM SUMMARY - NSSITUATIONBACKGROUNDRRT_GEN_ALL_CORE
57F metastatic cancer, admitted to hospital for hypocalcemia and PNA. On arrival, patient AOx2, lethargic. VS 70/40, , O2 sat 97%, FSG 68, T 98F. Informed by primary team patient is full code, determined not to have capacity for treatment decisions so awaiting input by family. Patient with no current IV access, tolerating small amounts of food. Medications include morphine q4h and PRN, and midodrine 5mg TID. PE notable for cachexia, dry mucous membranes, pale mucosa, CTAB, s1s2 RRR.   Patient likely hypotensive 2/2 hypovolemia from poor PO intake, possibly internal bleeding (patient Hgb ~6 this admission, received 1U PRBC with response). IV team called to bedside, placed two 20g IVs. CBC and CMP drawn. Started D5+LR and albumin, gave additional 5mg midodrine and recommended increase dose to 10mg TID. Continued follow-up with palliative team and family for GOC. Patient remained on floor.

## 2023-01-26 NOTE — PROGRESS NOTE ADULT - ASSESSMENT
_________________________________________________________________________________________  ========>>  M E D I C A L   A T T E N D I N G    F O L L O W  U P  N O T E  <<=========  -----------------------------------------------------------------------------------------------------    - Patient seen and examined by me earlier today.   - In summary,  JEF HOUSE is a 57y year old woman admitted with chest discomfort   - Patient reportedly lost IV access overnight nad several attempts were not successful to obtain access        patient this morning had an RRT for hypoglycemia and hypotension           IV acces secured, patient receiving glucose, IVH and albumin with improvement already, + mentating..     otherwise overall doing poorly, decreased interaction, worsened debility ..        ==================>> REVIEW OF SYSTEM <<=================    limited due to events above and in general pt less interactive  / conversive     c/o pain in lower back area >> to get IV tylenol given low BP     ==================>> PHYSICAL EXAM <<=================    GEN: A&O X 3 , NAD , comfortable, pleasant, calm , in bed , emaciated   HEENT: NCAT, PERRL, hearing intact, temporal wasting   Neck: supple , no JVD appreciated  CVS: S1S2 , regular , No M/R/G appreciated  PULM: CTA B/L,  limited exam as not taking deep breaths   ABD.: soft. non tender, non distended  Extrem: intact pulses , + B/L Le 2+ edema , cachectic . + nephrostomy tube with urine   PSYCH : flat affect, not anxious                             ( note written / Date of service   01-26-23 )    ==================>> MEDICATIONS <<====================    acetaminophen   IVPB .. 550 milliGRAM(s) IV Intermittent once  chlorhexidine 2% Cloths 1 Application(s) Topical <User Schedule>  dextrose 5% + lactated ringers. 1000 milliLiter(s) IV Continuous <Continuous>  dextrose 5%. 1000 milliLiter(s) IV Continuous <Continuous>  dextrose 50% Injectable 25 Gram(s) IV Push once  dextrose 50% Injectable 12.5 Gram(s) IV Push once  dextrose 50% Injectable 25 Gram(s) IV Push once  dextrose Oral Gel 15 Gram(s) Oral once  enoxaparin Injectable 30 milliGRAM(s) SubCutaneous every 24 hours  glucagon  Injectable 1 milliGRAM(s) IntraMuscular once  midodrine. 10 milliGRAM(s) Oral three times a day  mirtazapine 15 milliGRAM(s) Oral at bedtime  morphine   Solution 5 milliGRAM(s) Oral every 4 hours  naloxone Injectable 0.4 milliGRAM(s) IV Push once  polyethylene glycol 3350 17 Gram(s) Oral daily  senna 2 Tablet(s) Oral at bedtime    MEDICATIONS  (PRN):  acetaminophen     Tablet .. 325 milliGRAM(s) Oral every 6 hours PRN Mild Pain (1 - 3)  aluminum hydroxide/magnesium hydroxide/simethicone Suspension 30 milliLiter(s) Oral every 4 hours PRN Dyspepsia  melatonin 3 milliGRAM(s) Oral at bedtime PRN Insomnia  morphine   Solution 2.5 milliGRAM(s) Oral every 4 hours PRN Moderate Pain (4 - 6)  morphine   Solution 5 milliGRAM(s) Oral every 4 hours PRN Severe Pain (7 - 10)  naloxone Injectable 0.1 milliGRAM(s) IV Push every 3 minutes PRN sedation or respiratory depression  ondansetron Injectable 4 milliGRAM(s) IV Push every 8 hours PRN Nausea and/or Vomiting    ___________  Active diet:  Diet, Soft and Bite Sized:   Nikita(7 Gm Arginine/7 Gm Glut/1.2 Gm HMB     Qty per Day:  2  Supplement Feeding Modality:  Oral  Ensure Plus High Protein Cans or Servings Per Day:  3       Frequency:  Daily  ___________________    ==================>> VITAL SIGNS <<==================     Vital Signs Last 24 HrsT(C): 36.7 (01-26-23 @ 06:10)  T(F): 98.1 (01-26-23 @ 06:10), Max: 98.8 (01-25-23 @ 21:25)  HR: 116 (01-26-23 @ 06:43) (86 - 116)  BP: 92/60 (01-26-23 @ 06:43)  RR: 18 (01-26-23 @ 06:10) (16 - 18)  SpO2: 98% (01-26-23 @ 06:10) (97% - 99%)      CAPILLARY BLOOD GLUCOSE  POCT Blood Glucose.: 89 mg/dL (26 Jan 2023 10:13)  POCT Blood Glucose.: 68 mg/dL (26 Jan 2023 09:42)  POCT Blood Glucose.: 51 mg/dL (26 Jan 2023 09:18)  POCT Blood Glucose.: 54 mg/dL (26 Jan 2023 09:16)  POCT Blood Glucose.: 102 mg/dL (25 Jan 2023 21:38)  POCT Blood Glucose.: 76 mg/dL (25 Jan 2023 19:30)  POCT Blood Glucose.: 64 mg/dL (25 Jan 2023 19:09)  POCT Blood Glucose.: 51 mg/dL (25 Jan 2023 18:44)  POCT Blood Glucose.: 54 mg/dL (25 Jan 2023 18:40)  POCT Blood Glucose.: 78 mg/dL (25 Jan 2023 12:59)     ==================>> LAB AND IMAGING <<==================                        8.1    11.18 )-----------( 36       ( 26 Jan 2023 10:33 )             24.6        Hemoglobin:   8.1 <<==,  8.7 <<==,  6.0 <<==,  7.2 <<==,  8.4 <<==,  8.6 <<==    01-26    133<L>  |  99  |  34<H>  ----------------------------<  59<L>  4.5   |  20<L>  |  0.83    Ca    8.3<L>      26 Jan 2023 10:33  Phos  3.1     01-26  Mg     1.8     01-26    TPro  5.1<L>  /  Alb  2.0<L>  /  TBili  2.2<H>  /  DBili  x   /  AST  73<H>  /  ALT  55<H>  /  AlkPhos  586<H>  01-26    ___________________________________________________________________________________  ===============>>  A S S E S S M E N T   A N D   P L A N <<===============  ------------------------------------------------------------------------------------------    · Assessment	  56 y/o woman with PMH Asthma, Anemia, Bladder cancer with metastases to the lungs, direct invasion to vagina, on chronic home O2 for comfort, Bradycardia / AF post PPM, Dyslipidemia, Hydronephrosis, R-nephrostomy tube, Liver cyst, Parathyroid tumor, Chronic pain on morphine sulfate at home ( not on hospice) presenting to the ED via EMS from oncologist office for worsening lethargy and intermittent central chest pain. Found to have profound hypercalcemia.     Problem/Plan - 1:  ·  Problem: Hypercalcemia of malignancy    improved > observe off iv fluids >> patient drinking PO fluids   -s/p calcitonin and Bisphosphonate administration    - trend CMP    supplement hypokalemia / hypomagnesemia as needed  keep K~4, Mag ~2    Problem/Plan - 2:  ·  Problem: Leukocytosis.  improved      post treatment empirically for possible PNA     ** Anemia of chronic disease  - PRBC as needed > one more unit yesterday     Problem/Plan - 3:  ·  Problem: Bladder cancer.       patient too weak and deconditioned, not candidate any treatments   patient deconditioned with baseline cachexia, severe protein calorie malnutrition, debility     palliative following: mainly for pain control >> signed off today !        patient not agreeable to hospice      family to decide on goals of care and management as discussed.     Problem/Plan - 4:  ·  Problem: Prophylactic measure    ·  Plan: -Diet: Regular  -DVT ppx: Lovenox qd.    patient very weak, deconditioned, debilitated... not ready for DC home    patient has taken a turn for the worse..     doubtful patient would be able to participate in any rehab...      psych and case management notes appreciated      I had a long discussion withpt's sister / HCP about pt's status, prognosis, options and DNR status.. all questions answered.          she will discuss with the rest of the family and make a decision about DNR status and likely transfer to an inpatient hospice facility ( home hospice is not an option per sister )     monitor hypoglycemia >> on D5  monitor hypotension >> on IV albumin, on IVH  pain management   [id number] care  prognosis grave    --------------------------------------------  Case discussed with RN, NEWTON  ___________________________  HLeslee Ochoa D.O.  Pager: [phone]

## 2023-01-26 NOTE — CONSULT NOTE ADULT - ATTENDING COMMENTS
Patient seen and examined.   Unfortunately, end of life.   She appears comfortable and is surrounded by family.     DNR/DNI. No need for MICU care.
history of bladder cancer suspicious of liver and lung metastases  her performance status 3+  cachexia  poor appetite  chest and back pain  not a candidate for systemic treatment  refuse to discuss GOC  supportive care for hypercalcemia and pain control  will follow up

## 2023-01-27 NOTE — PROGRESS NOTE ADULT - PROBLEM SELECTOR PROBLEM 1
Pain due to neoplasm

## 2023-01-27 NOTE — PROGRESS NOTE ADULT - REASON FOR ADMISSION
chest pain

## 2023-01-27 NOTE — PROGRESS NOTE ADULT - PROBLEM SELECTOR PLAN 6
- Pt DNR/I, comfort measures only as per HCP Giovanna  If patient develops s&s orders placed for:   - 3mg Morphine IVP q 4 hours prn for dyspnea   - 0.5 mg Ativan IVP prn for agitation/anxiety/delirium   - 0.4 mg glycopyrrolate q 6 hours prn for excessive secretions - Will continue to follow for GOC/S&S   - Can be reached by TEAMS M-F 9-5 Lina Zhu Any other time please page [phone] if needed   - Pt DNR/I, comfort measures only as per HCP Giovanna  If patient develops s&s orders placed for:   - 3mg Morphine IVP q 4 hours prn for dyspnea   - 0.5 mg Ativan IVP prn for agitation/anxiety/delirium   - 0.4 mg glycopyrrolate q 6 hours prn for excessive secretions - Will continue to follow for GOC/S&S   - Can be reached by TEAMS M-F 9-5 Lina Zhu Any other time please page [phone] if needed   - Pt DNR/I, comfort measures only as per HCP Giovanna  If patient develops any symptoms of distress, would place the following orders:   - 3mg Morphine IVP q 4 hours prn for dyspnea   - 0.5 mg Ativan IVP prn for agitation/anxiety/delirium   - 0.4 mg glycopyrrolate q 6 hours prn for excessive secretions

## 2023-01-27 NOTE — PROGRESS NOTE ADULT - ASSESSMENT
_________________________________________________________________________________________  ========>>  M E D I C A L   A T T E N D I N G    F O L L O W  U P  N O T E  <<=========  -----------------------------------------------------------------------------------------------------    - Patient seen and examined by me earlier today.   - In summary,  JEF HOUSE is a 57y year old woman admitted with chest discomfort   - Patient reportedly lost IV access overnight nad several attempts were not successful to obtain access        patient this morning had an RRT for hypoglycemia and hypotension           IV acces secured, patient receiving glucose, IVH and albumin with improvement already, + mentating..     otherwise overall doing poorly, decreased interaction, worsened debility ..        ==================>> REVIEW OF SYSTEM <<=================    limited due to events above and in general pt less interactive  / conversive     c/o pain in lower back area >> to get IV tylenol given low BP     ==================>> PHYSICAL EXAM <<=================    GEN: A&O X 3 , NAD , comfortable, pleasant, calm , in bed , emaciated   HEENT: NCAT, PERRL, hearing intact, temporal wasting   Neck: supple , no JVD appreciated  CVS: S1S2 , regular , No M/R/G appreciated  PULM: CTA B/L,  limited exam as not taking deep breaths   ABD.: soft. non tender, non distended  Extrem: intact pulses , + B/L Le 2+ edema , cachectic . + nephrostomy tube with urine   PSYCH : flat affect, not anxious                               ( Note written / Date of service 01-27-23 )    ==================>> MEDICATIONS <<====================    chlorhexidine 2% Cloths 1 Application(s) Topical <User Schedule>  dextrose 5%. 1000 milliLiter(s) IV Continuous <Continuous>  midodrine. 10 milliGRAM(s) Oral three times a day  morphine   Solution 5 milliGRAM(s) Oral every 4 hours  naloxone Injectable 0.4 milliGRAM(s) IV Push once  polyethylene glycol 3350 17 Gram(s) Oral daily  senna 2 Tablet(s) Oral at bedtime    MEDICATIONS  (PRN):  acetaminophen     Tablet .. 325 milliGRAM(s) Oral every 6 hours PRN Mild Pain (1 - 3)  aluminum hydroxide/magnesium hydroxide/simethicone Suspension 30 milliLiter(s) Oral every 4 hours PRN Dyspepsia  glycopyrrolate Injectable 0.4 milliGRAM(s) IV Push every 6 hours PRN excessive/audible secretions  LORazepam   Injectable 0.5 milliGRAM(s) IV Push every 4 hours PRN anxiety/agitation/delirium  morphine   Solution 5 milliGRAM(s) Oral every 4 hours PRN Severe Pain (7 - 10)  morphine   Solution 2.5 milliGRAM(s) Oral every 4 hours PRN Moderate Pain (4 - 6)  morphine  - Injectable 3 milliGRAM(s) IV Push every 4 hours PRN Severe Pain (7 - 10)  morphine  - Injectable 2 milliGRAM(s) IV Push every 4 hours PRN Moderate Pain (4 - 6)  morphine  - Injectable 3 milliGRAM(s) IV Push every 4 hours PRN dyspnea  naloxone Injectable 0.1 milliGRAM(s) IV Push every 3 minutes PRN sedation or respiratory depression  ondansetron Injectable 4 milliGRAM(s) IV Push every 8 hours PRN Nausea and/or Vomiting    ___________  Active diet:  Diet, Soft and Bite Sized:   Nikita(7 Gm Arginine/7 Gm Glut/1.2 Gm HMB     Qty per Day:  2  Supplement Feeding Modality:  Oral  Ensure Plus High Protein Cans or Servings Per Day:  3       Frequency:  Daily  ___________________    ==================>> VITAL SIGNS <<==================    Vital Signs Last 24 HrsT(C): 36.3 (01-27-23 @ 05:29)  T(F): 97.3 (01-27-23 @ 05:29), Max: 97.4 (01-26-23 @ 21:10)  HR: 100 (01-27-23 @ 11:22) (71 - 100)  BP: 82/50 (01-27-23 @ 11:22)  RR: 16 (01-27-23 @ 10:04) (16 - 18)  SpO2: 99% (01-27-23 @ 05:29) (96% - 99%)      CAPILLARY BLOOD GLUCOSE      POCT Blood Glucose.: 96 mg/dL (26 Jan 2023 17:37)     ==================>> LAB AND IMAGING <<==================                        8.1    11.18 )-----------( 36       ( 26 Jan 2023 10:33 )             24.6        01-26    133<L>  |  99  |  34<H>  ----------------------------<  59<L>  4.5   |  20<L>  |  0.83    Ca    8.3<L>      26 Jan 2023 10:33  Phos  3.1     01-26  Mg     1.8     01-26    TPro  5.1<L>  /  Alb  2.0<L>  /  TBili  2.2<H>  /  DBili  x   /  AST  73<H>  /  ALT  55<H>  /  AlkPhos  586<H>  01-26    WBC count:   11.18 <<== ,  8.55 <<== ,  10.06 <<== ,  11.28 <<== ,  11.81 <<==   Hemoglobin:   8.1 <<==,  8.7 <<==,  6.0 <<==,  7.2 <<==,  8.4 <<==  platelets:  36 <==, 38 <==, 40 <==, 46 <==, 49 <==, 70 <==      ___________________________________________________________________________________  ===============>>  A S S E S S M E N T   A N D   P L A N <<===============  ------------------------------------------------------------------------------------------    · Assessment	  58 y/o woman with PMH Asthma, Anemia, Bladder cancer with metastases to the lungs, direct invasion to vagina, on chronic home O2 for comfort, Bradycardia / AF post PPM, Dyslipidemia, Hydronephrosis, R-nephrostomy tube, Liver cyst, Parathyroid tumor, Chronic pain on morphine sulfate at home ( not on hospice) presenting to the ED via EMS from oncologist office for worsening lethargy and intermittent central chest pain. Found to have profound hypercalcemia.     Problem/Plan - 1:  ·  Problem: Hypercalcemia of malignancy    improved > observe off iv fluids >> patient drinking PO fluids   -s/p calcitonin and Bisphosphonate administration    - trend CMP    supplement hypokalemia / hypomagnesemia as needed  keep K~4, Mag ~2    Problem/Plan - 2:  ·  Problem: Leukocytosis.  improved      post treatment empirically for possible PNA     ** Anemia of chronic disease  - PRBC as needed > one more unit yesterday     Problem/Plan - 3:  ·  Problem: Bladder cancer.       patient too weak and deconditioned, not candidate any treatments   patient deconditioned with baseline cachexia, severe protein calorie malnutrition, debility     palliative following: mainly for pain control >> signed off today !        patient not agreeable to hospice      family to decide on goals of care and management as discussed.     Problem/Plan - 4:  ·  Problem: Prophylactic measure    ·  Plan: -Diet: Regular  -DVT ppx: Lovenox qd.    patient very weak, deconditioned, debilitated... not ready for DC home    patient has taken a turn for the worse..     doubtful patient would be able to participate in any rehab...      psych and case management notes appreciated      I had a long discussion withpt's sister / HCP about pt's status, prognosis, options and DNR status.. all questions answered.          she will discuss with the rest of the family and make a decision about DNR status and likely transfer to an inpatient hospice facility ( home hospice is not an option per sister )     monitor hypoglycemia >> on D5  monitor hypotension >> on IV albumin, on IVH  pain management   [id number] care  prognosis grave    --------------------------------------------  Case discussed with RN, [place]  ___________________________  HLeslee Ochoa D.O.  Pager: [phone]       _________________________________________________________________________________________  ========>>  M E D I C A L   A T T E N D I N G    F O L L O W  U P  N O T E  <<=========  -----------------------------------------------------------------------------------------------------    - Patient seen and examined by me earlier today.   - In summary,  JEF HOUSE is a 57y year old woman admitted with chest discomfort   noted events with hypotension.. appreciated ICU eval and further goals of care and treatment options as noted     patient is now DNR per HCP .. patient being considered for PCU        ==================>> REVIEW OF SYSTEM <<=================    limited due to events above and in general pt less interactive  / conversive as much     c/o pain in lower back area >> d/w RN to give more Rx ... pt's two sons at bedside and agreeable     ==================>> PHYSICAL EXAM <<=================    GEN: A&O X 3 , NAD , comfortable, pleasant, calm , in bed , emaciated   HEENT: NCAT, PERRL, hearing intact, temporal wasting   Neck: supple , no JVD appreciated  CVS: S1S2 , regular , No M/R/G appreciated  PULM: CTA B/L,  limited exam as not taking deep breaths   ABD.: soft. non tender, non distended  Extrem: intact pulses , + B/L Le 2+ edema , cachectic . + nephrostomy tube with urine   PSYCH : flat affect, not anxious                               ( Note written / Date of service 01-27-23 )    ==================>> MEDICATIONS <<====================    chlorhexidine 2% Cloths 1 Application(s) Topical <User Schedule>  dextrose 5%. 1000 milliLiter(s) IV Continuous <Continuous>  midodrine. 10 milliGRAM(s) Oral three times a day  morphine   Solution 5 milliGRAM(s) Oral every 4 hours  naloxone Injectable 0.4 milliGRAM(s) IV Push once  polyethylene glycol 3350 17 Gram(s) Oral daily  senna 2 Tablet(s) Oral at bedtime    MEDICATIONS  (PRN):  acetaminophen     Tablet .. 325 milliGRAM(s) Oral every 6 hours PRN Mild Pain (1 - 3)  aluminum hydroxide/magnesium hydroxide/simethicone Suspension 30 milliLiter(s) Oral every 4 hours PRN Dyspepsia  glycopyrrolate Injectable 0.4 milliGRAM(s) IV Push every 6 hours PRN excessive/audible secretions  LORazepam   Injectable 0.5 milliGRAM(s) IV Push every 4 hours PRN anxiety/agitation/delirium  morphine   Solution 5 milliGRAM(s) Oral every 4 hours PRN Severe Pain (7 - 10)  morphine   Solution 2.5 milliGRAM(s) Oral every 4 hours PRN Moderate Pain (4 - 6)  morphine  - Injectable 3 milliGRAM(s) IV Push every 4 hours PRN Severe Pain (7 - 10)  morphine  - Injectable 2 milliGRAM(s) IV Push every 4 hours PRN Moderate Pain (4 - 6)  morphine  - Injectable 3 milliGRAM(s) IV Push every 4 hours PRN dyspnea  naloxone Injectable 0.1 milliGRAM(s) IV Push every 3 minutes PRN sedation or respiratory depression  ondansetron Injectable 4 milliGRAM(s) IV Push every 8 hours PRN Nausea and/or Vomiting    ___________  Active diet:  Diet, Soft and Bite Sized:   Nikita(7 Gm Arginine/7 Gm Glut/1.2 Gm HMB     Qty per Day:  2  Supplement Feeding Modality:  Oral  Ensure Plus High Protein Cans or Servings Per Day:  3       Frequency:  Daily  ___________________    ==================>> VITAL SIGNS <<==================    Vital Signs Last 24 HrsT(C): 36.3 (01-27-23 @ 05:29)  T(F): 97.3 (01-27-23 @ 05:29), Max: 97.4 (01-26-23 @ 21:10)  HR: 100 (01-27-23 @ 11:22) (71 - 100)  BP: 82/50 (01-27-23 @ 11:22)  RR: 16 (01-27-23 @ 10:04) (16 - 18)  SpO2: 99% (01-27-23 @ 05:29) (96% - 99%)        POCT Blood Glucose.: 96 mg/dL (26 Jan 2023 17:37)     ==================>> LAB AND IMAGING <<==================                        8.1    11.18 )-----------( 36       ( 26 Jan 2023 10:33 )             24.6        01-26    133<L>  |  99  |  34<H>  ----------------------------<  59<L>  4.5   |  20<L>  |  0.83    Ca    8.3<L>      26 Jan 2023 10:33  Phos  3.1     01-26  Mg     1.8     01-26    TPro  5.1<L>  /  Alb  2.0<L>  /  TBili  2.2<H>  /  DBili  x   /  AST  73<H>  /  ALT  55<H>  /  AlkPhos  586<H>  01-26    WBC count:   11.18 <<== ,  8.55 <<== ,  10.06 <<== ,  11.28 <<== ,  11.81 <<==   Hemoglobin:   8.1 <<==,  8.7 <<==,  6.0 <<==,  7.2 <<==,  8.4 <<==  platelets:  36 <==, 38 <==, 40 <==, 46 <==, 49 <==, 70 <==      ___________________________________________________________________________________  ===============>>  A S S E S S M E N T   A N D   P L A N <<===============  ------------------------------------------------------------------------------------------    · Assessment	  56 y/o woman with PMH Asthma, Anemia, Bladder cancer with metastases to the lungs, direct invasion to vagina, on chronic home O2 for comfort, Bradycardia / AF post PPM, Dyslipidemia, Hydronephrosis, R-nephrostomy tube, Liver cyst, Parathyroid tumor, Chronic pain on morphine sulfate at home ( not on hospice) presenting to the ED via EMS from oncologist office for worsening lethargy and intermittent central chest pain. Found to have profound hypercalcemia.     Problem/Plan - 1:  ·  Problem: Hypercalcemia of malignancy    improved > observe off iv fluids >> patient drinking PO fluids   -s/p calcitonin and Bisphosphonate administration    - trend CMP    supplement hypokalemia / hypomagnesemia as needed  keep K~4, Mag ~2    Problem/Plan - 2:  ·  Problem: Leukocytosis.  improved      post treatment empirically for possible PNA     ** Anemia of chronic disease  - PRBC as needed     Problem/Plan - 3:  ·  Problem: Bladder cancer.       patient too weak and deconditioned, not candidate any treatments   patient deconditioned with baseline cachexia, severe protein calorie malnutrition, debility     palliative following: being considered for PCU     Problem/Plan - 4:  ·  Problem: Prophylactic measure    ·  Plan: -Diet: Regular  -DVT ppx: Lovenox qd.    monitor hypoglycemia >> on D5  monitor hypotension >> IV albumin as needed   pain management   supportive care  prognosis grave    --------------------------------------------  Case discussed with RN, NP, patient and sons   ___________________________  H. NIEVES Ochoa.  Pager: [phone]

## 2023-01-27 NOTE — PROGRESS NOTE ADULT - TIME BILLING
symptom assessment and management, supportive counseling, coordination of care, discussion and coordination with team.
Symptom assessment and management, supportive counseling, coordination of care

## 2023-01-27 NOTE — PROGRESS NOTE ADULT - CONVERSATION DETAILS
*** Advance directive /  goals of care discussion      I had a long discussion with patient ( and family) about patient's overall diagnosis, expected prognosis, and potential complications.       Discussed treatment options, comfort care / hospice as appropriate, and all other potential options of care.         Discussed risks, benefits, and alternatives of treatment as well.          Opportunity given for and all questions answered.            Reviewed available advance directives as available > pt's sister, arianna is HCP and she coordinates with pt's adult children as well      At this time patient is full code, discussed DNR in ramesh who is with a terminal cancer > she will discuss and get back to us..      Goal is for pt to hopefully go to an inpatient hospice facility if possible / agreed by family: patient has not been cooperating with PT and is too weak and deconditioned to do so ...       will continue to discuss GOC with pt and family and update plan as needed.     Patient's family have my contact information and will contact me with questions.     Additional time spent on Goals of care: 22 min.
Initially spoke with eldest son Rl at bedside. Rl is the oldest of 5 children of Ms. Bell. He stated this has been very difficult as the patient has not been forthcoming regarding cancer diagnosis until recently. He stated that himself, the patient and family find strength in god and in one another. Discussed what the goals for the patient going forward will be: he stated that himself and his aunt (Giovanna) have been making decisions regarding Ms. Bell's care together. The ultimate goal going forward is to keep Ms. Bell comfortable. He stated he knows that his mother is dying, and just wants what is best for her. Emotional support provided.     Follow up phone call with sister/HCP Giovanna. Informed Giovanna of conversation that was had with Rl. She stated how difficult it has been to be HCP as she feels she often has to deliver the bad news. When questioned where she finds her strength/support she reinforced the same ideations as Rl including finding strength in her family and in god. Emotional support provided. Giovanna continued to explain that she had to decide yesterday to make the patient DNR/I understanding the low likelihood of a meaningful recovery for Ms. Bell. Continued discussion that Ms. Bell has been refusing many medications including her BP support medication, and her blood pressure is very low. Discussed transition to comfort/symptom directed approach to ensure quality of life at this time. Ms. Bell in agreement, discussed stopping blood draws, fingersticks, Enoxaprin injections, will continue other medications at this time. Discussed possibility of Ms. Bell losing her ability to take oral medications and recommendations will be made regarding IV alternatives. Giovanna verbalized understanding and in agreement. Giovanna continued to verbalize that she knows Ms. Bell is dying and ultimately would like to keep her comfortable. Emotional support provided

## 2023-01-27 NOTE — PROGRESS NOTE ADULT - CONVERSATION/DISCUSSION
Diagnosis/Prognosis/MOLST Discussed/Treatment Options
Diagnosis/Prognosis/Treatment Options/Holistic/Hospice Referral

## 2023-01-27 NOTE — PROGRESS NOTE ADULT - PROBLEM/PLAN-2
DISPLAY PLAN FREE TEXT
25
DISPLAY PLAN FREE TEXT

## 2023-01-27 NOTE — PROGRESS NOTE ADULT - PROBLEM SELECTOR PLAN 1
- Continue Morphine sol 5mg po q4 ATC   - Changed PO Morphine prns to IV    3mg Morphine IVP q 4 hours prn for severe pain  2mg Morphine IVP q 4 hours prn for moderate pain    - If pt loses oral route consider   Morphine 2mg IVP q 4 hours ATC - Continue Morphine sol 5mg po q4 ATC as long as pt with reliable oral route  - Changed breakthrough meds to IV    3mg Morphine IVP q 4 hours prn for severe pain  2mg Morphine IVP q 4 hours prn for moderate pain    - If pt loses oral route consider changing oral morphine solution 5 mg q4 to Morphine 2mg IVP q 4 hours ATC

## 2023-01-27 NOTE — PROGRESS NOTE ADULT - PROBLEM SELECTOR PROBLEM 2
Bladder cancer
Constipation

## 2023-01-27 NOTE — PROGRESS NOTE ADULT - PROBLEM SELECTOR PROBLEM 3
Functional quadriplegia
Bladder cancer
Functional quadriplegia

## 2023-01-27 NOTE — PROGRESS NOTE ADULT - PROBLEM SELECTOR PLAN 5
- See GOC above  - Spoke with son Rl and sister Giovanna family opting for comfort measures only - See GOC above  - Spoke with son Rl and sister Giovanna family opting for comfort measures only. Labs/fingersticks/non-essential meds d/c'ed. Symptom management outlined above and below.

## 2023-01-27 NOTE — PROGRESS NOTE ADULT - PROVIDER SPECIALTY LIST ADULT
Cardiology
Internal Medicine
Cardiology
Internal Medicine
Cardiology
Internal Medicine
Palliative Care

## 2023-01-27 NOTE — PROGRESS NOTE ADULT - PROBLEM SELECTOR PROBLEM 4
Encounter for palliative care
Functional quadriplegia

## 2023-01-27 NOTE — PROGRESS NOTE ADULT - PROBLEM SELECTOR PLAN 3
Last chemo 5/2022  - defer to primary team  - pt comfort measures only
PPS 30% requires assistance with all basic needs

## 2023-01-27 NOTE — PROGRESS NOTE ADULT - SUBJECTIVE AND OBJECTIVE BOX
CARDIOLOGY     PROGRESS  NOTE   ________________________________________________    CHIEF COMPLAINT:Patient is a 57y old  Female who presents with a chief complaint of chest pain (26 Jan 2023 14:51)  no complain  	  REVIEW OF SYSTEMS:  CONSTITUTIONAL: No fever, weight loss, or fatigue  EYES: No eye pain, visual disturbances, or discharge  ENT:  No difficulty hearing, tinnitus, vertigo; No sinus or throat pain  NECK: No pain or stiffness  RESPIRATORY: No cough, wheezing, chills or hemoptysis; No Shortness of Breath  CARDIOVASCULAR: No chest pain, palpitations, passing out, dizziness, or leg swelling  GASTROINTESTINAL: No abdominal or epigastric pain. No nausea, vomiting, or hematemesis; No diarrhea or constipation. No melena or hematochezia.  GENITOURINARY: No dysuria, frequency, hematuria, or incontinence  NEUROLOGICAL: No headaches, memory loss, loss of strength, numbness, or tremors  SKIN: No itching, burning, rashes, or lesions   LYMPH Nodes: No enlarged glands  ENDOCRINE: No heat or cold intolerance; No hair loss  MUSCULOSKELETAL: No joint pain or swelling; No muscle, back, or extremity pain  PSYCHIATRIC: No depression, anxiety, mood swings, or difficulty sleeping  HEME/LYMPH: No easy bruising, or bleeding gums  ALLERGY AND IMMUNOLOGIC: No hives or eczema	    [ ] All others negative	  [x ] Unable to obtain    PHYSICAL EXAM:  T(C): 36.3 (01-27-23 @ 05:29), Max: 36.7 (01-26-23 @ 12:48)  HR: 88 (01-27-23 @ 05:29) (66 - 88)  BP: 86/59 (01-27-23 @ 05:29) (78/47 - 94/61)  RR: 18 (01-27-23 @ 05:29) (18 - 18)  SpO2: 99% (01-27-23 @ 05:29) (96% - 99%)  Wt(kg): --  I&O's Summary    26 Jan 2023 07:01  -  27 Jan 2023 07:00  --------------------------------------------------------  IN: 80 mL / OUT: 300 mL / NET: -220 mL        Appearance: Normal	  HEENT:   Normal oral mucosa, PERRL, EOMI	  Lymphatic: No lymphadenopathy  Cardiovascular: Normal S1 S2, No JVD, + murmurs, No edema  Respiratory: Lungs clear to auscultation	  Psychiatry: A & O x 3, Mood & affect appropriate  Gastrointestinal:  Soft, Non-tender, + BS	  Skin: No rashes, No ecchymoses, No cyanosis	  Neurologic: Non-focal  Extremities: Normal range of motion, No clubbing, cyanosis or edema  Vascular: Peripheral pulses palpable 2+ bilaterally    MEDICATIONS  (STANDING):  chlorhexidine 2% Cloths 1 Application(s) Topical <User Schedule>  dextrose 5% + lactated ringers. 1000 milliLiter(s) (1000 mL/Hr) IV Continuous <Continuous>  dextrose 5%. 1000 milliLiter(s) (40 mL/Hr) IV Continuous <Continuous>  dextrose 50% Injectable 25 Gram(s) IV Push once  dextrose 50% Injectable 12.5 Gram(s) IV Push once  dextrose 50% Injectable 25 Gram(s) IV Push once  dextrose Oral Gel 15 Gram(s) Oral once  enoxaparin Injectable 30 milliGRAM(s) SubCutaneous every 24 hours  glucagon  Injectable 1 milliGRAM(s) IntraMuscular once  midodrine. 10 milliGRAM(s) Oral three times a day  mirtazapine 15 milliGRAM(s) Oral at bedtime  morphine   Solution 5 milliGRAM(s) Oral every 4 hours  naloxone Injectable 0.4 milliGRAM(s) IV Push once  polyethylene glycol 3350 17 Gram(s) Oral daily  senna 2 Tablet(s) Oral at bedtime      TELEMETRY: 	    ECG:  	  RADIOLOGY:  OTHER: 	  	  LABS:	 	    CARDIAC MARKERS:                                8.1    11.18 )-----------( 36       ( 26 Jan 2023 10:33 )             24.6     01-26    133<L>  |  99  |  34<H>  ----------------------------<  59<L>  4.5   |  20<L>  |  0.83    Ca    8.3<L>      26 Jan 2023 10:33  Phos  3.1     01-26  Mg     1.8     01-26    TPro  5.1<L>  /  Alb  2.0<L>  /  TBili  2.2<H>  /  DBili  x   /  AST  73<H>  /  ALT  55<H>  /  AlkPhos  586<H>  01-26    proBNP: Serum Pro-Brain Natriuretic Peptide: 510 pg/mL (01-06 @ 19:43)    Lipid Profile:   HgA1c:   TSH:         Assessment and plan  ---------------------------  Bradycardia, Hypertension, Dyslipidemia, Hydronephrosis, R-nephrostomy tube, Liver cyst, Parathyroid tumor, Chronic pain on morphine 2.5 (last took this morning) and 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 Patient reported the pain is sharp well localized and occasionally pleuritic. Has chronic cough with bloody or brown sputum. Reports feeling dehydrated. Has not had chemo since 5/2022. Patient denied abdominal pain, N/V/D, fever, urinary symptoms, rash  pt with metastatic bladder ca / onc eval  s/p ppm , sss  chest pain doubt cardiac ?metastatic ca bony mets  dvt prophylaxis  severe protein deficiency, nutrition eval   hydration  psych eval start Remeron 15 mg po qhs  pain management  PE has been ruled out several admissions  dvt prophylaxis  nutrition consult severe protein deficiency appreciated  check labs, hypercalcemia, increase fluid,  endocrine eval noted  ?abx check urin  oncology eval appreciated, awaiting ct chest abdomen and pelvis  physical therapy  replete K, keep K>4  doubt pt is a candidate for chemo being so malnourished   replete lytes  ppm was recently checked  hypotension decrease morphine dose may need to add midodrine on 5 mg tid  may decrease morphine to three times per day, bp better controlled, pain management  decrease hgb transfuse keep hgb>8  discussed with family re palliative care/ hospice care pt can not take care of herself  hypotensive on midodrine, dose was increased  decrease Lovenox dose  may consider comfort care

## 2023-01-27 NOTE — PROGRESS NOTE ADULT - PROBLEM SELECTOR PLAN 4
Noted GIOVANNA in "patient window" DATED 12/5/22,  indicating  FULL CODE INCLUDING LONG TERM INTUBATION , NO LIMITATIONS ON MEDICAL INTERVENTIONS, ARTIFICIAL NUTRITION IF INDICATED , CONTINUED USE OF ANTIBIOTICS .  Palliative care will follow for pain  Pt reports that she is struggling with the fact that her dtr is sick with MG and she is unable to be there for her to support her since she is sick.  Pt believes that the Lord will heal her and has a plan.  She believes in miracles and one will happen for her.  Discussed chaplaincy referral. Pt in agreement with referral.
Noted MOLST in "patient window" DATED 12/5/22,  indicating  FULL CODE INCLUDING LONG TERM INTUBATION , NO LIMITATIONS ON MEDICAL INTERVENTIONS, ARTIFICIAL NUTRITION IF INDICATED , CONTINUED USE OF ANTIBIOTICS .
Noted MOLST in "patient window" DATED 12/5/22,  indicating  FULL CODE INCLUDING LONG TERM INTUBATION , NO LIMITATIONS ON MEDICAL INTERVENTIONS, ARTIFICIAL NUTRITION IF INDICATED , CONTINUED USE OF ANTIBIOTICS .  Palliative care will follow for pain
Noted MOLST in "patient window" DATED 12/5/22,  indicating  FULL CODE INCLUDING LONG TERM INTUBATION , NO LIMITATIONS ON MEDICAL INTERVENTIONS, ARTIFICIAL NUTRITION IF INDICATED , CONTINUED USE OF ANTIBIOTICS .  Palliative care will follow for GOC
PPS 30% requires assistance with all basic needs
Noted MOLST in "patient window" DATED 12/5/22,  indicating  FULL CODE INCLUDING LONG TERM INTUBATION , NO LIMITATIONS ON MEDICAL INTERVENTIONS, ARTIFICIAL NUTRITION IF INDICATED , CONTINUED USE OF ANTIBIOTICS .  Palliative care will follow for pain

## 2023-01-27 NOTE — PROGRESS NOTE ADULT - NS ATTEND AMEND GEN_ALL_CORE FT
57F with Asthma, Anemia, Bladder cancer with metastases to the lungs (patient of Dr Kan) on chronic home O2, Bradycardia, Hypertension, Dyslipidemia, Hydronephrosis, R-nephrostomy tube, Liver cyst, Parathyroid tumor, Chronic pain on morphine 2.5 (last took this morning) and PPM in-situ, who is presenting to the ED via EMS from outpt office for worsening lethargy and intermittent central chest pain. Hospital course c/b ongoing pain, hypercalcemia, bleeding requiring transfusions, and PNA. Geriatrics and Palliative Medicine Team is consulted for PCU evaluation given worsening clinical deterioration    Pt seen twice today but refuses to engage in GOC conversation with us. Conversation held with pt's HCP/sister outlined above, in agreement with d/c labs/fingersticks/non-essential meds, and optimizing her symptom directed care. Family asking to continue gentle IVFs for now, will continue to educate/ especially if pt develops adverse effects from this. Pt would be a good candidate for PCU if she is agreeable.     An MD Ilia  [facility] Team Consults  Please call if we can be of assistance, [phone]

## 2023-01-27 NOTE — PROGRESS NOTE ADULT - PROBLEM SELECTOR PLAN 2
- Pt has not had BM documented since 1/15/2023, no acute complaints   - Continue current bowel regimen as tolerated   - Will add dulcolax suppository daily prn - Pt has not had BM documented since 1/15/2023, no acute complaints   - Continue current bowel regimen as tolerated

## 2023-01-27 NOTE — PROGRESS NOTE ADULT - NUTRITIONAL ASSESSMENT
This patient has been assessed with a concern for Malnutrition and has been determined to have a diagnosis/diagnoses of Severe protein-calorie malnutrition and Underweight (BMI < 19).    This patient is being managed with:   Diet Soft and Bite Sized-  Nikita(7 Gm Arginine/7 Gm Glut/1.2 Gm HMB     Qty per Day:  1  Liquid Protein Supplement     Qty per Day:  1  Supplement Feeding Modality:  Oral  Ensure Plus High Protein Cans or Servings Per Day:  2       Frequency:  Two Times a day  Ensure Plant-Based Cans or Servings Per Day:  1       Frequency:  Daily  Entered: Fabrizio 10 2023  8:56PM    
This patient has been assessed with a concern for Malnutrition and has been determined to have a diagnosis/diagnoses of Severe protein-calorie malnutrition and Underweight (BMI < 19).    This patient is being managed with:   Diet Soft and Bite Sized-  Nikita(7 Gm Arginine/7 Gm Glut/1.2 Gm HMB     Qty per Day:  2  Supplement Feeding Modality:  Oral  Ensure Plus High Protein Cans or Servings Per Day:  3       Frequency:  Daily  Entered: Jan 19 2023  1:32PM    
This patient has been assessed with a concern for Malnutrition and has been determined to have a diagnosis/diagnoses of Severe protein-calorie malnutrition and Underweight (BMI < 19).    This patient is being managed with:   Diet Soft and Bite Sized-  Nikita(7 Gm Arginine/7 Gm Glut/1.2 Gm HMB     Qty per Day:  1  Liquid Protein Supplement     Qty per Day:  1  Supplement Feeding Modality:  Oral  Ensure Plus High Protein Cans or Servings Per Day:  2       Frequency:  Two Times a day  Ensure Plant-Based Cans or Servings Per Day:  1       Frequency:  Daily  Entered: Fabrizio 10 2023  8:56PM    
This patient has been assessed with a concern for Malnutrition and has been determined to have a diagnosis/diagnoses of Severe protein-calorie malnutrition and Underweight (BMI < 19).    This patient is being managed with:   Diet Soft and Bite Sized-  Nikita(7 Gm Arginine/7 Gm Glut/1.2 Gm HMB     Qty per Day:  2  Supplement Feeding Modality:  Oral  Ensure Plus High Protein Cans or Servings Per Day:  3       Frequency:  Daily  Entered: Jan 19 2023  1:32PM    
This patient has been assessed with a concern for Malnutrition and has been determined to have a diagnosis/diagnoses of Severe protein-calorie malnutrition and Underweight (BMI < 19).    This patient is being managed with:   Diet Soft and Bite Sized-  Nikita(7 Gm Arginine/7 Gm Glut/1.2 Gm HMB     Qty per Day:  1  Liquid Protein Supplement     Qty per Day:  1  Supplement Feeding Modality:  Oral  Ensure Plus High Protein Cans or Servings Per Day:  2       Frequency:  Two Times a day  Ensure Plant-Based Cans or Servings Per Day:  1       Frequency:  Daily  Entered: Fabrizio 10 2023  8:56PM    

## 2023-01-27 NOTE — PROGRESS NOTE ADULT - SUBJECTIVE AND OBJECTIVE BOX
Indication for Geriatrics and Palliative Care Services/INTERVAL HPI: GOC/S&S Management     SUBJECTIVE AND OBJECTIVE: Pt seen and examined at bedside. Pt lethargic on exam, able to minimally participate in exam, unable to participate in complete assessment. Family at bedside visiting.     OVERNIGHT EVENTS: RRT yesterday for hypotension, Giovanna(HCP) and family opting for DNR/I.     DNR on chart:Yes  Yes      Allergies    No Known Allergies    Intolerances    lactose (Unknown)  MEDICATIONS  (STANDING):  chlorhexidine 2% Cloths 1 Application(s) Topical <User Schedule>  dextrose 5%. 1000 milliLiter(s) (40 mL/Hr) IV Continuous <Continuous>  midodrine. 10 milliGRAM(s) Oral three times a day  mirtazapine 15 milliGRAM(s) Oral at bedtime  morphine   Solution 5 milliGRAM(s) Oral every 4 hours  naloxone Injectable 0.4 milliGRAM(s) IV Push once  polyethylene glycol 3350 17 Gram(s) Oral daily  senna 2 Tablet(s) Oral at bedtime    MEDICATIONS  (PRN):  acetaminophen     Tablet .. 325 milliGRAM(s) Oral every 6 hours PRN Mild Pain (1 - 3)  aluminum hydroxide/magnesium hydroxide/simethicone Suspension 30 milliLiter(s) Oral every 4 hours PRN Dyspepsia  melatonin 3 milliGRAM(s) Oral at bedtime PRN Insomnia  morphine   Solution 5 milliGRAM(s) Oral every 4 hours PRN Severe Pain (7 - 10)  morphine   Solution 2.5 milliGRAM(s) Oral every 4 hours PRN Moderate Pain (4 - 6)  naloxone Injectable 0.1 milliGRAM(s) IV Push every 3 minutes PRN sedation or respiratory depression  ondansetron Injectable 4 milliGRAM(s) IV Push every 8 hours PRN Nausea and/or Vomiting      ITEMS UNCHECKED ARE NOT PRESENT    PRESENT SYMPTOMS: [x ]Unable to self-report - see [ ] CPOT [x ] PAINADS [x ] RDOS  Source if other than patient:  [ ]Family   [ ]Team     Pain:  [ ]yes [ ]no  QOL impact -   Location -                    Aggravating factors -  Quality -  Radiation -  Timing-  Severity (0-10 scale):  Minimal acceptable level (0-10 scale):     CPOT:    https://www.[facility].org/getattachment/[email or web address]/Critical-Care-Pain-Observation-Tool-(CPOT)    PAINAD Score: See PAINAD tool and score below       Dyspnea:                           [ ]Mild [ ]Moderate [ ]Severe    RDOS: See RDOS tool and score below   0 to 2  minimal or no respiratory distress   3  mild distress  4 to 6 moderate distress  >7 severe distress      Anxiety:                             [ ]Mild [ ]Moderate [ ]Severe  Fatigue:                             [ ]Mild [ ]Moderate [x ]Severe  Nausea:                             [ ]Mild [ ]Moderate [ ]Severe  Loss of appetite:              [ ]Mild [ ]Moderate [x ]Severe  Constipation:                    [ ]Mild [ ]Moderate [ ]Severe    PCSSQ[Palliative Care Spiritual Screening Question]   Severity (0-10):  Score of 4 or > indicate consideration of Chaplaincy referral.  Chaplaincy Referral: [x ] yes [ ] refused [x ] following [ ] Deferred     Caregiver Trimble? : [ ] yes [ ] no [ x] Deferred [ ] Declined             Social work referral [ ] Patient & Family Centered Care Referral [ ]     Anticipatory Grief present?:  [ x] yes [ ] no  [ ] Deferred                  Social work referral [ ] Chaplaincy Referral [x ]    		  Other Symptoms:  [x ]All other review of systems negative: lethargic, minimal ability to participate in exam     Palliative Performance Status Version 2:   See PPSv2 tool and score below         PHYSICAL EXAM:  Vital Signs Last 24 Hrs  T(C): 36.3 (27 Jan 2023 05:29), Max: 36.3 (26 Jan 2023 21:10)  T(F): 97.3 (27 Jan 2023 05:29), Max: 97.4 (26 Jan 2023 21:10)  HR: 100 (27 Jan 2023 11:22) (71 - 100)  BP: 82/50 (27 Jan 2023 11:22) (78/47 - 94/61)  BP(mean): --  RR: 16 (27 Jan 2023 10:04) (16 - 18)  SpO2: 99% (27 Jan 2023 05:29) (96% - 99%)    Parameters below as of 27 Jan 2023 05:29  Patient On (Oxygen Delivery Method): nasal cannula  O2 Flow (L/min): 2   I&O's Summary    26 Jan 2023 07:01  -  27 Jan 2023 07:00  --------------------------------------------------------  IN: 80 mL / OUT: 300 mL / NET: -220 mL    27 Jan 2023 07:01  -  27 Jan 2023 15:27  --------------------------------------------------------  IN: 0 mL / OUT: 450 mL / NET: -450 mL       GENERAL: [ ]Cachexia    [ ]Alert  [x ]Oriented x 2  [x ]Lethargic  [ ]Unarousable  [x ]Verbal  [ ]Non-Verbal  Behavioral:   [ ]Anxiety  [ ]Delirium [ ]Agitation [ ]Other  HEENT:  [ ]Normal   [x ]Dry mouth   [ ]ET Tube/Trach  [ ]Oral lesions  PULMONARY:   [ ]Clear [ ]Tachypnea  [ ]Audible excessive secretions   [ ]Rhonchi        [ ]Right [ ]Left [ ]Bilateral  [ ]Crackles        [ ]Right [ ]Left [ ]Bilateral  [ ]Wheezing     [ ]Right [ ]Left [ ]Bilateral  [x ]Diminished BS [ ] Right [ ]Left [x ]Bilateral  CARDIOVASCULAR:    [x ]Regular [ ]Irregular [ ]Tachy  [ ]Bradley [ ]Murmur [ ]Other  GASTROINTESTINAL:  [x ]Soft  [ ]Distended   [x ]+BS  [x ]Non tender [ ]Tender  [ ]Other [ ]PEG [ ]OGT/ NGT   Last BM: No documented BM 1/15/2023  GENITOURINARY:  [ ]Normal [x ]Incontinent: nephrostomy    [ ]Oliguria/Anuria   [ ]Lloyd  MUSCULOSKELETAL:   [ ]Normal   [x ]Weakness  [x ]Bed/Wheelchair bound [ ]Edema  NEUROLOGIC:   [ ]No focal deficits  [x ] Cognitive impairment  [ ] Dysphagia [ ]Dysarthria [ ] Paresis [ ]Other   SKIN:   [ ]Normal  [ ]Rash  [ ]Other  [x ]Pressure ulcer(s) [x ]y [ ]n present on admission    CRITICAL CARE:  [ ]Shock Present  [ ]Septic [ ]Cardiogenic [ ]Neurologic [ ]Hypovolemic  [ ]Vasopressors [ ]Inotropes  [ ]Respiratory failure present [ ]Mechanical Ventilation [ ]Non-invasive ventilatory support [ ]High-Flow   [ ]Acute  [ ]Chronic [ ]Hypoxic  [ ]Hypercarbic [ ]Other  [ ]Other organ failure     LABS:                        8.1    11.18 )-----------( 36       ( 26 Jan 2023 10:33 )             24.6   01-26    133<L>  |  99  |  34<H>  ----------------------------<  59<L>  4.5   |  20<L>  |  0.83    Ca    8.3<L>      26 Jan 2023 10:33  Phos  3.1     01-26  Mg     1.8     01-26    TPro  5.1<L>  /  Alb  2.0<L>  /  TBili  2.2<H>  /  DBili  x   /  AST  73<H>  /  ALT  55<H>  /  AlkPhos  586<H>  01-26        RADIOLOGY & ADDITIONAL STUDIES:  < from: IR Change Nephrostomy Catheter, Right (12.07.22 @ 12:14) >  ACC: [id number] EXAM:  SP FLUOROSCOPY TO 1 HOUR                        ACC: [id number] EXAM:  SP NEPHROSTOGRM NEW AC RT [facility]                        ACC: [id number] EXAM:  IR PROCEDURE                        ACC: [id number] EXAM:  SP CHNG NEPHRO CATH RT [facility]                          PROCEDURE DATE:  12/07/2022          INTERPRETATION:  PROCEDURE: Right nephrostomy catheter evaluation and   exchange.    : ALEJANDRINA Araya A Palvanov, MD    ANESTHESIA: Intravenous sedation was administered by the attending   anesthesiologist. A total of 1 mL of 1% lidocaine was used for local   anesthesia.    CLINICAL HISTORY: 55 yo female with a history of bladder cancer and   chronic right nephrostomy catheter, last exchanged on 08/22/2022. Patient   presents for an exchange of right nephrostomy tube.    TECHNIQUE: After discussing the risks, benefits and alternatives to the   procedure, informed consent was obtained. A timeout was performed.    Patient was placed prone on the fluoroscopy examination tableand   connected to physiologic monitoring. Examination of the patient's   nephrostomy drain site demonstrated a well-positioned catheter. The right   flank and nephrostomy catheter was prepped and draped in a sterile   fashion. Preliminary fluoroscopy demonstrated a pigtail catheter   overlying the right hemiabdomen with no evidence of kink or fracture. A   nephrostogram was performed, which demonstrated a minimally dilated   collecting system, with a slightly retracted catheter within the lower   pole. The catheter hub was cut and multiple attempts were made to advance   various wires via the catheter into the collecting system, however a wire   was not able to be passed due to encrustation of the catheter. With the   aid of 0.035 Glidewire,the catheter was gently removed under   fluoroscopic observation. A 4 Syriac Berenstein catheter and Glidewire   were used to re-catheterize the right renal pelvis, which was confirmed   with injection of contrast. Subsequently, a stiff 0.035 wire was advanced   into the collecting system and the Berenstein catheter was exchanged over   the wire for a new 10.2 Syriac locking loop pigtail catheter, which was   formed within the right renal pelvis under fluoroscopic observation. A   nephrostogram was performed, confirming catheter positioning. The   catheter was flushed with normal saline, secured to patient's skin with   2-0 nylon suture, and connected to gravity drainage. A dry sterile   dressing was applied.    The patient tolerated the procedure well and was transferred from the   department in stable condition. There were no immediate complications.    IMPRESSION: Successful fluoroscopy-guided exchange of right nephrostomy   catheter, as described above.    The patient was instructed to make a follow-up appointment for routine   exchange in 1-2 months, or sooner if she encounters problems with the   catheter.    --- End of Report ---          PEE TINOCO ,Interventional Radiology  This document has been electronically signed.  URBANO PRABHAKAR MD; Attending Interventional Radiologist  This document has been electronically signed. Dec  9 2022 12:10PM    < end of copied text >      Protein Calorie Malnutrition Present: [ ]mild [ ]moderate [ ]severe [ ]underweight [ ]morbid obesity  https://www.andeal.org/vault/2440/web/files/ONC/Table_Clinical%20Characteristics%20to%20Document%20Malnutrition-White%20JV%20et%20al%202012.pdf    Height (cm): 162.6 (01-07-23 @ 23:00), 162.6 (12-23-22 @ 22:59), 162.6 (12-01-22 @ 15:25)  Weight (kg): 36 (01-07-23 @ 23:00), 33.6 (12-23-22 @ 22:59), 35.2986 (12-23-22 @ 10:00)  BMI (kg/m2): 13.6 (01-07-23 @ 23:00), 12.7 (12-23-22 @ 22:59), 13.4 (12-23-22 @ 10:00)    [x ]PPSV2 < or = 30%  [ ]significant weight loss [ ]poor nutritional intake [ ]anasarca[ ]Artificial Nutrition    Other REFERRALS:  [ ]Hospice  [ ]Child Life  [ ]Social Work  [ ]Case management [ ]Holistic Therapy     Goals of Care Document: see below

## 2023-01-28 NOTE — DISCHARGE NOTE FOR THE EXPIRED PATIENT - HOSPITAL COURSE
58 y/o woman with PMH Asthma, Anemia, Bladder cancer with metastases to the lungs, direct invasion to vagina, on chronic home O2 for comfort, Bradycardia / AF post PPM, Dyslipidemia, Hydronephrosis, R-nephrostomy tube, Liver cyst, Parathyroid tumor, Chronic pain on morphine sulfate at home ( not on hospice) presenting to the ED via EMS from oncologist office for worsening lethargy and intermittent central chest pain. Found to have profound hypercalcemia.      Problem/Plan - 1:  ·  Problem: Hypercalcemia of malignancy    improved > observe off iv fluids >> patient drinking PO fluids   -s/p calcitonin and Bisphosphonate administration    - trend CMP    supplement hypokalemia / hypomagnesemia as needed  keep K~4, Mag ~2     Problem/Plan - 2:  ·  Problem: Leukocytosis.  improved      post treatment empirically for possible PNA     ** Anemia of chronic disease  - PRBC as needed      Problem/Plan - 3:  ·  Problem: Bladder cancer.       patient too weak and deconditioned, not candidate any treatments   patient deconditioned with baseline cachexia, severe protein calorie malnutrition, debility     palliative following: being considered for PCU      Problem/Plan - 4:  ·  Problem: Prophylactic measure    ·  Plan: -Diet: Regular  -DVT ppx: Lovenox qd.    monitor hypoglycemia >> on D5  monitor hypotension >> IV albumin as needed   pain management   supportive care      Pt was made DNR/DNI with comfort measures only, during this admission.

## 2023-01-28 NOTE — CHART NOTE - NSCHARTNOTEFT_GEN_A_CORE
Death Note: Called to see the patient for unresponsiveness.     On exam:  Physical exam showed patient in bed, unresponsive to verbal or noxious stimuli.  Pupils are fixed and dilated.  No spontaneous respirations.  Skin pale. Absent heart and breath sounds.  No palpable carotid or radial pulses.    Absent peripheral pulses.  Patient pronounced dead at 3:52.   Dr Ochoa notified .   Family informed and at bedside.        Jennifer Leiva PA-C    Department of Medicine  [id number]

## 2023-02-23 NOTE — ED ADULT NURSE NOTE - NSFALLRSKASSESSDT_ED_ALL_ED
02/23/23 0908   PRE-TX-O2   Device (Oxygen Therapy) room air   SpO2 95 %   Pulse Oximetry Type Intermittent   $ Pulse Oximetry - Multiple Charge Pulse Oximetry - Multiple   Aerosol Therapy   $ Aerosol Therapy Charges PRN treatment not required   Incentive Spirometer   $ Incentive Spirometer Charges done with encouragement   Administration (IS) self-administered   Number of Repetitions (IS) 10   Level Incentive Spirometer (mL) 2000   Patient Tolerance (IS) good;no adverse signs/symptoms present        23-Feb-2022 16:28

## 2023-03-23 NOTE — CONSULT NOTE ADULT - PROVIDER SPECIALTY LIST ADULT
MRI lumbar spine report received from [facility]. Writer contacted facility and requested a disc be mailed to [address] [place] [address] for Dr Staples to review. Per representative at that facility, disc will be placed in the mail today.    Patient has follow up with Dr Staples on 4/13/23.  
Palliative Care
Heme/Onc
Infectious Disease
Pain Medicine

## 2023-06-01 NOTE — PHYSICAL THERAPY INITIAL EVALUATION ADULT - FINE MOTOR COORDINATION, LEFT HAND THUMB/FINGER OPPOSITION SKILLS, OT EVAL
normal performance Mirvaso Counseling: Mirvaso is a topical medication which can decrease superficial blood flow where applied. Side effects are uncommon and include stinging, redness and allergic reactions.

## 2023-06-03 NOTE — ED ADULT TRIAGE NOTE - NS ED NURSE DIRECT TO ROOM YN
Pt came to xray for 1 view cxr, when pt put hand on rail to lean frwd IV in right hand came out, pt stated on blood thinners and pressure held until bleeding stopped. Pt stated he wanted to sign out AMA, I asked pt if he still wanted xray and he said yes. I asked pt if he wanted CT he said no. I took pt back to ER HD and notified RN Solo Polk that IV came out and he had xray but not CT.
Yes

## 2023-07-26 NOTE — PROGRESS NOTE ADULT - PROBLEM SELECTOR PLAN 3
PAT APPT 8/3/23 @ 3:00 serum sodium 161  started IVF D5W  Serum Na Resolved 141  Nephro following Dr. Murcia - p/w serum sodium 161  - now resolved  - s/p IVF D5W  - trend BMP  - avoid nephrotoxins  - Nephro following Dr. Murcia

## 2023-07-28 NOTE — H&P ADULT - PROBLEM SELECTOR PLAN 3
Pt presenting with diarrhea after receiving Ampicillin  f/u Stool cx, GI PCR and C. diff  NS 80cc/hr moderate assist (50% patients effort)

## 2023-09-13 NOTE — PROGRESS NOTE ADULT - PROBLEM SELECTOR PROBLEM 6
Pt unable to void at this time     Angely Rocha, MARIA GUADALUPE  09/12/23 [id number]
Pacemaker
Multiple lung nodules
Multiple lung nodules

## 2023-09-22 NOTE — ED PROVIDER NOTE - PHYSICAL EXAMINATION
Detail Level: Detailed
General Sunscreen Counseling: I recommended a broad spectrum sunscreen with a SPF of 30 or higher.  Sunscreens should be applied at least 15 minutes prior to expected sun exposure and then every 2 hours after that as long as sun exposure continues. If swimming or exercising sunscreen should be reapplied every 45 minutes to an hour after getting wet or sweating.  One ounce, or the equivalent of a shot glass full of sunscreen, is adequate to protect the skin not covered by a bathing suit. I also recommended a lip balm with a sunscreen as well.
Abd+ indwelling nephrostomy tubes.

## 2023-11-16 NOTE — PROGRESS NOTE ADULT - PROBLEM SELECTOR PLAN 3
chronic right nephrostomy tube exchanged 8/22  -CT A/P shows Stable superior left hydronephrosis. Marked irregular bladder wall thickening, unchanged.  -f/u OP hem/onc Yes

## 2023-12-18 NOTE — ED ADULT NURSE NOTE - NS ED NURSE REPORT GIVEN DT
Anesthesia Post-op Note    Patient: Nae Hastings Jr.  Procedure(s) Performed: COMPLEX PHACOEMULSIFICATION OF CATARACT WITH INTRAOCULAR LENS IMPLANT LEFT EYE (Left: Eye)  Anesthesia type: MAC    Vitals Value Taken Time   Temp 36 °C (96.8 °F) 12/18/23 1028   Pulse 59 12/18/23 1028   Resp 16 12/18/23 1028   SpO2 95 % 12/18/23 1028   /74 12/18/23 1028         Patient Location: Phase II  Post-op Vital Signs:stable  Level of Consciousness: awake, alert and oriented  Respiratory Status: spontaneous ventilation  Cardiovascular stable  Hydration: euvolemic  Pain Management: adequately controlled  Handoff: Handoff to receiving clinician was performed and questions were answered  Vomiting: none  Nausea: None  Airway Patency:patent  Post-op Assessment: no complications and patient tolerated procedure well      There were no known notable events for this encounter.                       04-Dec-2018 04:00

## 2024-01-08 NOTE — ED PROVIDER NOTE - NS ED ROS FT
Review of Systems    Constitutional: (-) fever, (-) chills, (+) fatigue  HEENT: (-) sore throat, (-) hearing loss, (-) nasal congestion  Cardiovascular: (+) chest pain, (-) syncope  Respiratory: (-) cough, (+) shortness of breath  Gastrointestinal: (-) vomiting, (-) diarrhea, (+) abdominal pain  Genitourinary: (+) dysuria, (+) vaginal bleeding  Musculoskeletal: (-) neck pain, (-) back pain  Integumentary: (-) rash, (-) edema, (-) wound  Neurological: (-) headache, (-) altered mental status    Except as documented in the HPI, all other systems are negative. 06-Jan-2024 00:00

## 2024-02-10 NOTE — PATIENT PROFILE ADULT - FALL HARM RISK - HARM RISK INTERVENTIONS
Good Assistance with ambulation/Assistance OOB with selected safe patient handling equipment/Communicate Risk of Fall with Harm to all staff/Discuss with provider need for PT consult/Monitor gait and stability/Provide patient with walking aids - walker, cane, crutches/Reinforce activity limits and safety measures with patient and family/Tailored Fall Risk Interventions/Visual Cue: Yellow wristband and red socks/Bed in lowest position, wheels locked, appropriate side rails in place/Call bell, personal items and telephone in reach/Instruct patient to call for assistance before getting out of bed or chair/Non-slip footwear when patient is out of bed/[place] to call system/Physically safe environment - no spills, clutter or unnecessary equipment/Purposeful Proactive Rounding/Room/bathroom lighting operational, light cord in reach

## 2024-02-27 NOTE — DIETITIAN INITIAL EVALUATION ADULT - PERSON TAUGHT/METHOD
Medical Necessity Information: It is in your best interest to select a reason for this procedure from the list below. All of these items fulfill various CMS LCD requirements except lesion extends to a margin. Include Z78.9 (Other Specified Conditions Influencing Health Status) As An Associated Diagnosis?: No Medical Necessity Clause: This procedure was medically necessary because the lesion that was treated was: Lab: [id number] Lab Facility: [id number] Body Location Override (Optional - Billing Will Still Be Based On Selected Body Map Location If Applicable): mid back Surgeon (Optional): Dr. Fransisca Laurent Size Of Lesion In Cm: 2 X Size Of Lesion In Cm (Optional): 1.5 Anesthesia Volume In Cc: 3 Excision Method: Elliptical Saucerization Depth: dermis and superficial adipose tissue Repair Type: Intermediate Intermediate / Complex Repair - Final Wound Length In Cm: 2.7 Suturegard Retention Suture: 2-0 Nylon Retention Suture Bite Size: 3 mm Length To Time In Minutes Device Was In Place: 10 Number Of Hemigard Strips Per Side: 1 Undermining Type: Entire Wound Debridement Text: The wound edges were debrided prior to proceeding with the closure to facilitate wound healing. Helical Rim Text: The closure involved the helical rim. Vermilion Border Text: The closure involved the vermilion border. Nostril Rim Text: The closure involved the nostril rim. Retention Suture Text: Retention sutures were placed to support the closure and prevent dehiscence. Suture Removal: 14 days Epidermal Closure Graft Donor Site (Optional): simple interrupted Graft Donor Site Bandage (Optional-Leave Blank If You Don't Want In Note): Steri-strips and a pressure bandage were applied to the donor site. Detail Level: Detailed Excision Depth: adipose tissue Scalpel Size: 15 blade Anesthesia Type: 1% lidocaine with epinephrine Hemostasis: Electrocautery Estimated Blood Loss (Cc): minimal Deep Sutures: 4-0 PDS Epidermal Sutures: 5-0 Polypropylene Wound Care: Petrolatum Dressing: pressure dressing with telfa Suturegard Intro: Intraoperative tissue expansion was performed, utilizing the SUTUREGARD device, in order to reduce wound tension. Suturegard Body: The suture ends were repeatedly re-tightened and re-clamped to achieve the desired tissue expansion. Hemigard Intro: Due to skin fragility and wound tension, it was decided to use HEMIGARD adhesive retention suture devices to permit a linear closure. The skin was cleaned and dried for a 6cm distance away from the wound. Excessive hair, if present, was removed to allow for adhesion. Hemigard Postcare Instructions: The HEMIGARD strips are to remain completely dry for at least 5-7 days. Complex Repair Preamble Text (Leave Blank If You Do Not Want): Extensive wide undermining was performed. Intermediate Repair Preamble Text (Leave Blank If You Do Not Want): Undermining was performed with blunt dissection. Fusiform Excision Additional Text (Leave Blank If You Do Not Want): The margin was drawn around the clinically apparent lesion.  A fusiform shape was then drawn on the skin incorporating the lesion and margins.  Incisions were then made along these lines to the appropriate tissue plane and the lesion was extirpated. Eliptical Excision Additional Text (Leave Blank If You Do Not Want): The margin was drawn around the clinically apparent lesion.  An elliptical shape was then drawn on the skin incorporating the lesion and margins.  Incisions were then made along these lines to the appropriate tissue plane and the lesion was extirpated. Saucerization Excision Additional Text (Leave Blank If You Do Not Want): The margin was drawn around the clinically apparent lesion.  Incisions were then made along these lines, in a tangential fashion, to the appropriate tissue plane and the lesion was extirpated. Slit Excision Additional Text (Leave Blank If You Do Not Want): A linear line was drawn on the skin overlying the lesion. An incision was made slowly until the lesion was visualized.  Once visualized, the lesion was removed with blunt dissection. Excisional Biopsy Additional Text (Leave Blank If You Do Not Want): The margin was drawn around the clinically apparent lesion. An elliptical shape was then drawn on the skin incorporating the lesion and margins.  Incisions were then made along these lines to the appropriate tissue plane and the lesion was extirpated. Perilesional Excision Additional Text (Leave Blank If You Do Not Want): The margin was drawn around the clinically apparent lesion. Incisions were then made along these lines to the appropriate tissue plane and the lesion was extirpated. Repair Performed By Another Provider Text (Leave Blank If You Do Not Want): After the tissue was excised the defect was repaired by another provider. No Repair - Repaired With Adjacent Surgical Defect Text (Leave Blank If You Do Not Want): After the excision the defect was repaired concurrently with another surgical defect which was in close approximation. Adjacent Tissue Transfer Text: The defect edges were debeveled with a #15 scalpel blade.  Given the location of the defect and the proximity to free margins an adjacent tissue transfer was deemed most appropriate.  Using a sterile surgical marker, an appropriate flap was drawn incorporating the defect and placing the expected incisions within the relaxed skin tension lines where possible.    The area thus outlined was incised deep to adipose tissue with a #15 scalpel blade.  The skin margins were undermined to an appropriate distance in all directions utilizing iris scissors. Advancement Flap (Single) Text: The defect edges were debeveled with a #15 scalpel blade.  Given the location of the defect and the proximity to free margins a single advancement flap was deemed most appropriate.  Using a sterile surgical marker, an appropriate advancement flap was drawn incorporating the defect and placing the expected incisions within the relaxed skin tension lines where possible.    The area thus outlined was incised deep to adipose tissue with a #15 scalpel blade.  The skin margins were undermined to an appropriate distance in all directions utilizing iris scissors. Advancement Flap (Double) Text: The defect edges were debeveled with a #15 scalpel blade.  Given the location of the defect and the proximity to free margins a double advancement flap was deemed most appropriate.  Using a sterile surgical marker, the appropriate advancement flaps were drawn incorporating the defect and placing the expected incisions within the relaxed skin tension lines where possible.    The area thus outlined was incised deep to adipose tissue with a #15 scalpel blade.  The skin margins were undermined to an appropriate distance in all directions utilizing iris scissors. Burow's Advancement Flap Text: The defect edges were debeveled with a #15 scalpel blade.  Given the location of the defect and the proximity to free margins a Burow's advancement flap was deemed most appropriate.  Using a sterile surgical marker, the appropriate advancement flap was drawn incorporating the defect and placing the expected incisions within the relaxed skin tension lines where possible.    The area thus outlined was incised deep to adipose tissue with a #15 scalpel blade.  The skin margins were undermined to an appropriate distance in all directions utilizing iris scissors. Chonodrocutaneous Helical Advancement Flap Text: The defect edges were debeveled with a #15 scalpel blade.  Given the location of the defect and the proximity to free margins a chondrocutaneous helical advancement flap was deemed most appropriate.  Using a sterile surgical marker, the appropriate advancement flap was drawn incorporating the defect and placing the expected incisions within the relaxed skin tension lines where possible.    The area thus outlined was incised deep to adipose tissue with a #15 scalpel blade.  The skin margins were undermined to an appropriate distance in all directions utilizing iris scissors. Crescentic Advancement Flap Text: The defect edges were debeveled with a #15 scalpel blade.  Given the location of the defect and the proximity to free margins a crescentic advancement flap was deemed most appropriate.  Using a sterile surgical marker, the appropriate advancement flap was drawn incorporating the defect and placing the expected incisions within the relaxed skin tension lines where possible.    The area thus outlined was incised deep to adipose tissue with a #15 scalpel blade.  The skin margins were undermined to an appropriate distance in all directions utilizing iris scissors. A-T Advancement Flap Text: The defect edges were debeveled with a #15 scalpel blade.  Given the location of the defect, shape of the defect and the proximity to free margins an A-T advancement flap was deemed most appropriate.  Using a sterile surgical marker, an appropriate advancement flap was drawn incorporating the defect and placing the expected incisions within the relaxed skin tension lines where possible.    The area thus outlined was incised deep to adipose tissue with a #15 scalpel blade.  The skin margins were undermined to an appropriate distance in all directions utilizing iris scissors. O-T Advancement Flap Text: The defect edges were debeveled with a #15 scalpel blade.  Given the location of the defect, shape of the defect and the proximity to free margins an O-T advancement flap was deemed most appropriate.  Using a sterile surgical marker, an appropriate advancement flap was drawn incorporating the defect and placing the expected incisions within the relaxed skin tension lines where possible.    The area thus outlined was incised deep to adipose tissue with a #15 scalpel blade.  The skin margins were undermined to an appropriate distance in all directions utilizing iris scissors. O-L Flap Text: The defect edges were debeveled with a #15 scalpel blade.  Given the location of the defect, shape of the defect and the proximity to free margins an O-L flap was deemed most appropriate.  Using a sterile surgical marker, an appropriate advancement flap was drawn incorporating the defect and placing the expected incisions within the relaxed skin tension lines where possible.    The area thus outlined was incised deep to adipose tissue with a #15 scalpel blade.  The skin margins were undermined to an appropriate distance in all directions utilizing iris scissors. O-Z Flap Text: The defect edges were debeveled with a #15 scalpel blade.  Given the location of the defect, shape of the defect and the proximity to free margins an O-Z flap was deemed most appropriate.  Using a sterile surgical marker, an appropriate transposition flap was drawn incorporating the defect and placing the expected incisions within the relaxed skin tension lines where possible. The area thus outlined was incised deep to adipose tissue with a #15 scalpel blade.  The skin margins were undermined to an appropriate distance in all directions utilizing iris scissors. Double O-Z Flap Text: The defect edges were debeveled with a #15 scalpel blade.  Given the location of the defect, shape of the defect and the proximity to free margins a Double O-Z flap was deemed most appropriate.  Using a sterile surgical marker, an appropriate transposition flap was drawn incorporating the defect and placing the expected incisions within the relaxed skin tension lines where possible. The area thus outlined was incised deep to adipose tissue with a #15 scalpel blade.  The skin margins were undermined to an appropriate distance in all directions utilizing iris scissors. V-Y Flap Text: The defect edges were debeveled with a #15 scalpel blade.  Given the location of the defect, shape of the defect and the proximity to free margins a V-Y flap was deemed most appropriate.  Using a sterile surgical marker, an appropriate advancement flap was drawn incorporating the defect and placing the expected incisions within the relaxed skin tension lines where possible.    The area thus outlined was incised deep to adipose tissue with a #15 scalpel blade.  The skin margins were undermined to an appropriate distance in all directions utilizing iris scissors. Mercedes Flap Text: The defect edges were debeveled with a #15 scalpel blade.  Given the location of the defect, shape of the defect and the proximity to free margins a Mercedes flap was deemed most appropriate.  Using a sterile surgical marker, an appropriate advancement flap was drawn incorporating the defect and placing the expected incisions within the relaxed skin tension lines where possible. The area thus outlined was incised deep to adipose tissue with a #15 scalpel blade.  The skin margins were undermined to an appropriate distance in all directions utilizing iris scissors. Modified Advancement Flap Text: The defect edges were debeveled with a #15 scalpel blade.  Given the location of the defect, shape of the defect and the proximity to free margins a modified advancement flap was deemed most appropriate.  Using a sterile surgical marker, an appropriate advancement flap was drawn incorporating the defect and placing the expected incisions within the relaxed skin tension lines where possible.    The area thus outlined was incised deep to adipose tissue with a #15 scalpel blade.  The skin margins were undermined to an appropriate distance in all directions utilizing iris scissors. Mucosal Advancement Flap Text: Given the location of the defect, shape of the defect and the proximity to free margins a mucosal advancement flap was deemed most appropriate. Incisions were made with a 15 blade scalpel in the appropriate fashion along the cutaneous vermillion border and the mucosal lip. The remaining actinically damaged mucosal tissue was excised.  The mucosal advancement flap was then elevated to the gingival sulcus with care taken to preserve the neurovascular structures and advanced into the primary defect. Care was taken to ensure that precise realignment of the vermilion border was achieved. Hatchet Flap Text: The defect edges were debeveled with a #15 scalpel blade.  Given the location of the defect, shape of the defect and the proximity to free margins a hatchet flap was deemed most appropriate.  Using a sterile surgical marker, an appropriate hatchet flap was drawn incorporating the defect and placing the expected incisions within the relaxed skin tension lines where possible.    The area thus outlined was incised deep to adipose tissue with a #15 scalpel blade.  The skin margins were undermined to an appropriate distance in all directions utilizing iris scissors. Rotation Flap Text: The defect edges were debeveled with a #15 scalpel blade.  Given the location of the defect, shape of the defect and the proximity to free margins a rotation flap was deemed most appropriate.  Using a sterile surgical marker, an appropriate rotation flap was drawn incorporating the defect and placing the expected incisions within the relaxed skin tension lines where possible.    The area thus outlined was incised deep to adipose tissue with a #15 scalpel blade.  The skin margins were undermined to an appropriate distance in all directions utilizing iris scissors. Bilateral Rotation Flap Text: The defect edges were debeveled with a #15 scalpel blade. Given the location of the defect, shape of the defect and the proximity to free margins a bilateral rotation flap was deemed most appropriate. Using a sterile surgical marker, an appropriate rotation flap was drawn incorporating the defect and placing the expected incisions within the relaxed skin tension lines where possible. The area thus outlined was incised deep to adipose tissue with a #15 scalpel blade. The skin margins were undermined to an appropriate distance in all directions utilizing iris scissors. Following this, the designed flap was carried over into the primary defect and sutured into place. Spiral Flap Text: The defect edges were debeveled with a #15 scalpel blade.  Given the location of the defect, shape of the defect and the proximity to free margins a spiral flap was deemed most appropriate.  Using a sterile surgical marker, an appropriate rotation flap was drawn incorporating the defect and placing the expected incisions within the relaxed skin tension lines where possible. The area thus outlined was incised deep to adipose tissue with a #15 scalpel blade.  The skin margins were undermined to an appropriate distance in all directions utilizing iris scissors. Staged Advancement Flap Text: The defect edges were debeveled with a #15 scalpel blade.  Given the location of the defect, shape of the defect and the proximity to free margins a staged advancement flap was deemed most appropriate.  Using a sterile surgical marker, an appropriate advancement flap was drawn incorporating the defect and placing the expected incisions within the relaxed skin tension lines where possible. The area thus outlined was incised deep to adipose tissue with a #15 scalpel blade.  The skin margins were undermined to an appropriate distance in all directions utilizing iris scissors. Star Wedge Flap Text: The defect edges were debeveled with a #15 scalpel blade.  Given the location of the defect, shape of the defect and the proximity to free margins a star wedge flap was deemed most appropriate.  Using a sterile surgical marker, an appropriate rotation flap was drawn incorporating the defect and placing the expected incisions within the relaxed skin tension lines where possible. The area thus outlined was incised deep to adipose tissue with a #15 scalpel blade.  The skin margins were undermined to an appropriate distance in all directions utilizing iris scissors. Transposition Flap Text: The defect edges were debeveled with a #15 scalpel blade.  Given the location of the defect and the proximity to free margins a transposition flap was deemed most appropriate.  Using a sterile surgical marker, an appropriate transposition flap was drawn incorporating the defect.    The area thus outlined was incised deep to adipose tissue with a #15 scalpel blade.  The skin margins were undermined to an appropriate distance in all directions utilizing iris scissors. Muscle Hinge Flap Text: The defect edges were debeveled with a #15 scalpel blade.  Given the size, depth and location of the defect and the proximity to free margins a muscle hinge flap was deemed most appropriate.  Using a sterile surgical marker, an appropriate hinge flap was drawn incorporating the defect. The area thus outlined was incised with a #15 scalpel blade.  The skin margins were undermined to an appropriate distance in all directions utilizing iris scissors. Mustarde Flap Text: The defect edges were debeveled with a #15 scalpel blade.  Given the size, depth and location of the defect and the proximity to free margins a Mustarde flap was deemed most appropriate.  Using a sterile surgical marker, an appropriate flap was drawn incorporating the defect. The area thus outlined was incised with a #15 scalpel blade.  The skin margins were undermined to an appropriate distance in all directions utilizing iris scissors. Nasal Turnover Hinge Flap Text: The defect edges were debeveled with a #15 scalpel blade.  Given the size, depth, location of the defect and the defect being full thickness a nasal turnover hinge flap was deemed most appropriate.  Using a sterile surgical marker, an appropriate hinge flap was drawn incorporating the defect. The area thus outlined was incised with a #15 scalpel blade. The flap was designed to recreate the nasal mucosal lining and the alar rim. The skin margins were undermined to an appropriate distance in all directions utilizing iris scissors. Nasalis-Muscle-Based Myocutaneous Island Pedicle Flap Text: Using a #15 blade, an incision was made around the donor flap to the level of the nasalis muscle. Wide lateral undermining was then performed in both the subcutaneous plane above the nasalis muscle, and in a submuscular plane just above periosteum. This allowed the formation of a free nasalis muscle axial pedicle (based on the angular artery) which was still attached to the actual cutaneous flap, increasing its mobility and vascular viability. Hemostasis was obtained with pinpoint electrocoagulation. The flap was mobilized into position and the pivotal anchor points positioned and stabilized with buried interrupted sutures. Subcutaneous and dermal tissues were closed in a multilayered fashion with sutures. Tissue redundancies were excised, and the epidermal edges were apposed without significant tension and sutured with sutures. verbal instruction/patient instructed Nasalis Myocutaneous Flap Text: Using a #15 blade, an incision was made around the donor flap to the level of the nasalis muscle. Wide lateral undermining was then performed in both the subcutaneous plane above the nasalis muscle, and in a submuscular plane just above periosteum. This allowed the formation of a free nasalis muscle axial pedicle which was still attached to the actual cutaneous flap, increasing its mobility and vascular viability. Hemostasis was obtained with pinpoint electrocoagulation. The flap was mobilized into position and the pivotal anchor points positioned and stabilized with buried interrupted sutures. Subcutaneous and dermal tissues were closed in a multilayered fashion with sutures. Tissue redundancies were excised, and the epidermal edges were apposed without significant tension and sutured with sutures. Orbicularis Oris Muscle Flap Text: The defect edges were debeveled with a #15 scalpel blade.  Given that the defect affected the competency of the oral sphincter an orbicularis oris muscle flap was deemed most appropriate to restore this competency and normal muscle function.  Using a sterile surgical marker, an appropriate flap was drawn incorporating the defect. The area thus outlined was incised with a #15 scalpel blade. Melolabial Transposition Flap Text: The defect edges were debeveled with a #15 scalpel blade.  Given the location of the defect and the proximity to free margins a melolabial flap was deemed most appropriate.  Using a sterile surgical marker, an appropriate melolabial transposition flap was drawn incorporating the defect.    The area thus outlined was incised deep to adipose tissue with a #15 scalpel blade.  The skin margins were undermined to an appropriate distance in all directions utilizing iris scissors. Rectangular Flap Text: The defect edges were debeveled with a #15 scalpel blade. Given the location of the defect and the proximity to free margins a rectangular flap was deemed most appropriate. Using a sterile surgical marker, an appropriate rectangular flap was drawn incorporating the defect. The area thus outlined was incised deep to adipose tissue with a #15 scalpel blade. The skin margins were undermined to an appropriate distance in all directions utilizing iris scissors. Following this, the designed flap was carried over into the primary defect and sutured into place. Rhombic Flap Text: The defect edges were debeveled with a #15 scalpel blade.  Given the location of the defect and the proximity to free margins a rhombic flap was deemed most appropriate.  Using a sterile surgical marker, an appropriate rhombic flap was drawn incorporating the defect.    The area thus outlined was incised deep to adipose tissue with a #15 scalpel blade.  The skin margins were undermined to an appropriate distance in all directions utilizing iris scissors. Rhomboid Transposition Flap Text: The defect edges were debeveled with a #15 scalpel blade.  Given the location of the defect and the proximity to free margins a rhomboid transposition flap was deemed most appropriate.  Using a sterile surgical marker, an appropriate rhomboid flap was drawn incorporating the defect.    The area thus outlined was incised deep to adipose tissue with a #15 scalpel blade.  The skin margins were undermined to an appropriate distance in all directions utilizing iris scissors. Bi-Rhombic Flap Text: The defect edges were debeveled with a #15 scalpel blade.  Given the location of the defect and the proximity to free margins a bi-rhombic flap was deemed most appropriate.  Using a sterile surgical marker, an appropriate rhombic flap was drawn incorporating the defect. The area thus outlined was incised deep to adipose tissue with a #15 scalpel blade.  The skin margins were undermined to an appropriate distance in all directions utilizing iris scissors. Helical Rim Advancement Flap Text: The defect edges were debeveled with a #15 blade scalpel.  Given the location of the defect and the proximity to free margins (helical rim) a double helical rim advancement flap was deemed most appropriate.  Using a sterile surgical marker, the appropriate advancement flaps were drawn incorporating the defect and placing the expected incisions between the helical rim and antihelix where possible.  The area thus outlined was incised through and through with a #15 scalpel blade.  With a skin hook and iris scissors, the flaps were gently and sharply undermined and freed up. Bilateral Helical Rim Advancement Flap Text: The defect edges were debeveled with a #15 blade scalpel.  Given the location of the defect and the proximity to free margins (helical rim) a bilateral helical rim advancement flap was deemed most appropriate.  Using a sterile surgical marker, the appropriate advancement flaps were drawn incorporating the defect and placing the expected incisions between the helical rim and antihelix where possible.  The area thus outlined was incised through and through with a #15 scalpel blade.  With a skin hook and iris scissors, the flaps were gently and sharply undermined and freed up. Ear Star Wedge Flap Text: The defect edges were debeveled with a #15 blade scalpel.  Given the location of the defect and the proximity to free margins (helical rim) an ear star wedge flap was deemed most appropriate.  Using a sterile surgical marker, the appropriate flap was drawn incorporating the defect and placing the expected incisions between the helical rim and antihelix where possible.  The area thus outlined was incised through and through with a #15 scalpel blade. Banner Transposition Flap Text: The defect edges were debeveled with a #15 scalpel blade.  Given the location of the defect and the proximity to free margins a Banner transposition flap was deemed most appropriate.  Using a sterile surgical marker, an appropriate flap drawn around the defect. The area thus outlined was incised deep to adipose tissue with a #15 scalpel blade.  The skin margins were undermined to an appropriate distance in all directions utilizing iris scissors. Bilobed Flap Text: The defect edges were debeveled with a #15 scalpel blade.  Given the location of the defect and the proximity to free margins a bilobe flap was deemed most appropriate.  Using a sterile surgical marker, an appropriate bilobe flap drawn around the defect.    The area thus outlined was incised deep to adipose tissue with a #15 scalpel blade.  The skin margins were undermined to an appropriate distance in all directions utilizing iris scissors. Bilobed Transposition Flap Text: The defect edges were debeveled with a #15 scalpel blade.  Given the location of the defect and the proximity to free margins a bilobed transposition flap was deemed most appropriate.  Using a sterile surgical marker, an appropriate bilobe flap drawn around the defect.    The area thus outlined was incised deep to adipose tissue with a #15 scalpel blade.  The skin margins were undermined to an appropriate distance in all directions utilizing iris scissors. Trilobed Flap Text: The defect edges were debeveled with a #15 scalpel blade.  Given the location of the defect and the proximity to free margins a trilobed flap was deemed most appropriate.  Using a sterile surgical marker, an appropriate trilobed flap drawn around the defect.    The area thus outlined was incised deep to adipose tissue with a #15 scalpel blade.  The skin margins were undermined to an appropriate distance in all directions utilizing iris scissors. Dorsal Nasal Flap Text: The defect edges were debeveled with a #15 scalpel blade.  Given the location of the defect and the proximity to free margins a dorsal nasal flap was deemed most appropriate.  Using a sterile surgical marker, an appropriate dorsal nasal flap was drawn around the defect.    The area thus outlined was incised deep to adipose tissue with a #15 scalpel blade.  The skin margins were undermined to an appropriate distance in all directions utilizing iris scissors. Island Pedicle Flap Text: The defect edges were debeveled with a #15 scalpel blade.  Given the location of the defect, shape of the defect and the proximity to free margins an island pedicle advancement flap was deemed most appropriate.  Using a sterile surgical marker, an appropriate advancement flap was drawn incorporating the defect, outlining the appropriate donor tissue and placing the expected incisions within the relaxed skin tension lines where possible.    The area thus outlined was incised deep to adipose tissue with a #15 scalpel blade.  The skin margins were undermined to an appropriate distance in all directions around the primary defect and laterally outward around the island pedicle utilizing iris scissors.  There was minimal undermining beneath the pedicle flap. Island Pedicle Flap With Canthal Suspension Text: The defect edges were debeveled with a #15 scalpel blade.  Given the location of the defect, shape of the defect and the proximity to free margins an island pedicle advancement flap was deemed most appropriate.  Using a sterile surgical marker, an appropriate advancement flap was drawn incorporating the defect, outlining the appropriate donor tissue and placing the expected incisions within the relaxed skin tension lines where possible. The area thus outlined was incised deep to adipose tissue with a #15 scalpel blade.  The skin margins were undermined to an appropriate distance in all directions around the primary defect and laterally outward around the island pedicle utilizing iris scissors.  There was minimal undermining beneath the pedicle flap. A suspension suture was placed in the canthal tendon to prevent tension and prevent ectropion. Alar Island Pedicle Flap Text: The defect edges were debeveled with a #15 scalpel blade.  Given the location of the defect, shape of the defect and the proximity to the alar rim an island pedicle advancement flap was deemed most appropriate.  Using a sterile surgical marker, an appropriate advancement flap was drawn incorporating the defect, outlining the appropriate donor tissue and placing the expected incisions within the nasal ala running parallel to the alar rim. The area thus outlined was incised with a #15 scalpel blade.  The skin margins were undermined minimally to an appropriate distance in all directions around the primary defect and laterally outward around the island pedicle utilizing iris scissors.  There was minimal undermining beneath the pedicle flap. Double Island Pedicle Flap Text: The defect edges were debeveled with a #15 scalpel blade.  Given the location of the defect, shape of the defect and the proximity to free margins a double island pedicle advancement flap was deemed most appropriate.  Using a sterile surgical marker, an appropriate advancement flap was drawn incorporating the defect, outlining the appropriate donor tissue and placing the expected incisions within the relaxed skin tension lines where possible.    The area thus outlined was incised deep to adipose tissue with a #15 scalpel blade.  The skin margins were undermined to an appropriate distance in all directions around the primary defect and laterally outward around the island pedicle utilizing iris scissors.  There was minimal undermining beneath the pedicle flap. Island Pedicle Flap-Requiring Vessel Identification Text: The defect edges were debeveled with a #15 scalpel blade.  Given the location of the defect, shape of the defect and the proximity to free margins an island pedicle advancement flap was deemed most appropriate.  Using a sterile surgical marker, an appropriate advancement flap was drawn, based on the axial vessel mentioned above, incorporating the defect, outlining the appropriate donor tissue and placing the expected incisions within the relaxed skin tension lines where possible.    The area thus outlined was incised deep to adipose tissue with a #15 scalpel blade.  The skin margins were undermined to an appropriate distance in all directions around the primary defect and laterally outward around the island pedicle utilizing iris scissors.  There was minimal undermining beneath the pedicle flap. Keystone Flap Text: The defect edges were debeveled with a #15 scalpel blade.  Given the location of the defect, shape of the defect a keystone flap was deemed most appropriate.  Using a sterile surgical marker, an appropriate keystone flap was drawn incorporating the defect, outlining the appropriate donor tissue and placing the expected incisions within the relaxed skin tension lines where possible. The area thus outlined was incised deep to adipose tissue with a #15 scalpel blade.  The skin margins were undermined to an appropriate distance in all directions around the primary defect and laterally outward around the flap utilizing iris scissors. O-T Plasty Text: The defect edges were debeveled with a #15 scalpel blade.  Given the location of the defect, shape of the defect and the proximity to free margins an O-T plasty was deemed most appropriate.  Using a sterile surgical marker, an appropriate O-T plasty was drawn incorporating the defect and placing the expected incisions within the relaxed skin tension lines where possible.    The area thus outlined was incised deep to adipose tissue with a #15 scalpel blade.  The skin margins were undermined to an appropriate distance in all directions utilizing iris scissors. O-Z Plasty Text: The defect edges were debeveled with a #15 scalpel blade.  Given the location of the defect, shape of the defect and the proximity to free margins an O-Z plasty (double transposition flap) was deemed most appropriate.  Using a sterile surgical marker, the appropriate transposition flaps were drawn incorporating the defect and placing the expected incisions within the relaxed skin tension lines where possible.    The area thus outlined was incised deep to adipose tissue with a #15 scalpel blade.  The skin margins were undermined to an appropriate distance in all directions utilizing iris scissors.  Hemostasis was achieved with electrocautery.  The flaps were then transposed into place, one clockwise and the other counterclockwise, and anchored with interrupted buried subcutaneous sutures. Double O-Z Plasty Text: The defect edges were debeveled with a #15 scalpel blade.  Given the location of the defect, shape of the defect and the proximity to free margins a Double O-Z plasty (double transposition flap) was deemed most appropriate.  Using a sterile surgical marker, the appropriate transposition flaps were drawn incorporating the defect and placing the expected incisions within the relaxed skin tension lines where possible. The area thus outlined was incised deep to adipose tissue with a #15 scalpel blade.  The skin margins were undermined to an appropriate distance in all directions utilizing iris scissors.  Hemostasis was achieved with electrocautery.  The flaps were then transposed into place, one clockwise and the other counterclockwise, and anchored with interrupted buried subcutaneous sutures. V-Y Plasty Text: The defect edges were debeveled with a #15 scalpel blade.  Given the location of the defect, shape of the defect and the proximity to free margins an V-Y advancement flap was deemed most appropriate.  Using a sterile surgical marker, an appropriate advancement flap was drawn incorporating the defect and placing the expected incisions within the relaxed skin tension lines where possible.    The area thus outlined was incised deep to adipose tissue with a #15 scalpel blade.  The skin margins were undermined to an appropriate distance in all directions utilizing iris scissors. H Plasty Text: Given the location of the defect, shape of the defect and the proximity to free margins a H-plasty was deemed most appropriate for repair.  Using a sterile surgical marker, the appropriate advancement arms of the H-plasty were drawn incorporating the defect and placing the expected incisions within the relaxed skin tension lines where possible. The area thus outlined was incised deep to adipose tissue with a #15 scalpel blade. The skin margins were undermined to an appropriate distance in all directions utilizing iris scissors.  The opposing advancement arms were then advanced into place in opposite direction and anchored with interrupted buried subcutaneous sutures. W Plasty Text: The lesion was extirpated to the level of the fat with a #15 scalpel blade.  Given the location of the defect, shape of the defect and the proximity to free margins a W-plasty was deemed most appropriate for repair.  Using a sterile surgical marker, the appropriate transposition arms of the W-plasty were drawn incorporating the defect and placing the expected incisions within the relaxed skin tension lines where possible.    The area thus outlined was incised deep to adipose tissue with a #15 scalpel blade.  The skin margins were undermined to an appropriate distance in all directions utilizing iris scissors.  The opposing transposition arms were then transposed into place in opposite direction and anchored with interrupted buried subcutaneous sutures. Z Plasty Text: The lesion was extirpated to the level of the fat with a #15 scalpel blade.  Given the location of the defect, shape of the defect and the proximity to free margins a Z-plasty was deemed most appropriate for repair.  Using a sterile surgical marker, the appropriate transposition arms of the Z-plasty were drawn incorporating the defect and placing the expected incisions within the relaxed skin tension lines where possible.    The area thus outlined was incised deep to adipose tissue with a #15 scalpel blade.  The skin margins were undermined to an appropriate distance in all directions utilizing iris scissors.  The opposing transposition arms were then transposed into place in opposite direction and anchored with interrupted buried subcutaneous sutures. Double Z Plasty Text: The lesion was extirpated to the level of the fat with a #15 scalpel blade. Given the location of the defect, shape of the defect and the proximity to free margins a double Z-plasty was deemed most appropriate for repair. Using a sterile surgical marker, the appropriate transposition arms of the double Z-plasty were drawn incorporating the defect and placing the expected incisions within the relaxed skin tension lines where possible. The area thus outlined was incised deep to adipose tissue with a #15 scalpel blade. The skin margins were undermined to an appropriate distance in all directions utilizing iris scissors. The opposing transposition arms were then transposed and carried over into place in opposite direction and anchored with interrupted buried subcutaneous sutures. Zygomaticofacial Flap Text: Given the location of the defect, shape of the defect and the proximity to free margins a zygomaticofacial flap was deemed most appropriate for repair.  Using a sterile surgical marker, the appropriate flap was drawn incorporating the defect and placing the expected incisions within the relaxed skin tension lines where possible. The area thus outlined was incised deep to adipose tissue with a #15 scalpel blade with preservation of a vascular pedicle.  The skin margins were undermined to an appropriate distance in all directions utilizing iris scissors.  The flap was then placed into the defect and anchored with interrupted buried subcutaneous sutures. Cheek Interpolation Flap Text: A decision was made to reconstruct the defect utilizing an interpolation axial flap and a staged reconstruction.  A telfa template was made of the defect.  This telfa template was then used to outline the Cheek Interpolation flap.  The donor area for the pedicle flap was then injected with anesthesia.  The flap was excised through the skin and subcutaneous tissue down to the layer of the underlying musculature.  The interpolation flap was carefully excised within this deep plane to maintain its blood supply.  The edges of the donor site were undermined.   The donor site was closed in a primary fashion.  The pedicle was then rotated into position and sutured.  Once the tube was sutured into place, adequate blood supply was confirmed with blanching and refill.  The pedicle was then wrapped with xeroform gauze and dressed appropriately with a telfa and gauze bandage to ensure continued blood supply and protect the attached pedicle. Cheek-To-Nose Interpolation Flap Text: A decision was made to reconstruct the defect utilizing an interpolation axial flap and a staged reconstruction.  A telfa template was made of the defect.  This telfa template was then used to outline the Cheek-To-Nose Interpolation flap.  The donor area for the pedicle flap was then injected with anesthesia.  The flap was excised through the skin and subcutaneous tissue down to the layer of the underlying musculature.  The interpolation flap was carefully excised within this deep plane to maintain its blood supply.  The edges of the donor site were undermined.   The donor site was closed in a primary fashion.  The pedicle was then rotated into position and sutured.  Once the tube was sutured into place, adequate blood supply was confirmed with blanching and refill.  The pedicle was then wrapped with xeroform gauze and dressed appropriately with a telfa and gauze bandage to ensure continued blood supply and protect the attached pedicle. Interpolation Flap Text: A decision was made to reconstruct the defect utilizing an interpolation axial flap and a staged reconstruction.  A telfa template was made of the defect.  This telfa template was then used to outline the interpolation flap.  The donor area for the pedicle flap was then injected with anesthesia.  The flap was excised through the skin and subcutaneous tissue down to the layer of the underlying musculature.  The interpolation flap was carefully excised within this deep plane to maintain its blood supply.  The edges of the donor site were undermined.   The donor site was closed in a primary fashion.  The pedicle was then rotated into position and sutured.  Once the tube was sutured into place, adequate blood supply was confirmed with blanching and refill.  The pedicle was then wrapped with xeroform gauze and dressed appropriately with a telfa and gauze bandage to ensure continued blood supply and protect the attached pedicle. Melolabial Interpolation Flap Text: A decision was made to reconstruct the defect utilizing an interpolation axial flap and a staged reconstruction.  A telfa template was made of the defect.  This telfa template was then used to outline the melolabial interpolation flap.  The donor area for the pedicle flap was then injected with anesthesia.  The flap was excised through the skin and subcutaneous tissue down to the layer of the underlying musculature.  The pedicle flap was carefully excised within this deep plane to maintain its blood supply.  The edges of the donor site were undermined.   The donor site was closed in a primary fashion.  The pedicle was then rotated into position and sutured.  Once the tube was sutured into place, adequate blood supply was confirmed with blanching and refill.  The pedicle was then wrapped with xeroform gauze and dressed appropriately with a telfa and gauze bandage to ensure continued blood supply and protect the attached pedicle. Mastoid Interpolation Flap Text: A decision was made to reconstruct the defect utilizing an interpolation axial flap and a staged reconstruction.  A telfa template was made of the defect.  This telfa template was then used to outline the mastoid interpolation flap.  The donor area for the pedicle flap was then injected with anesthesia.  The flap was excised through the skin and subcutaneous tissue down to the layer of the underlying musculature.  The pedicle flap was carefully excised within this deep plane to maintain its blood supply.  The edges of the donor site were undermined.   The donor site was closed in a primary fashion.  The pedicle was then rotated into position and sutured.  Once the tube was sutured into place, adequate blood supply was confirmed with blanching and refill.  The pedicle was then wrapped with xeroform gauze and dressed appropriately with a telfa and gauze bandage to ensure continued blood supply and protect the attached pedicle. Posterior Auricular Interpolation Flap Text: A decision was made to reconstruct the defect utilizing an interpolation axial flap and a staged reconstruction.  A telfa template was made of the defect.  This telfa template was then used to outline the posterior auricular interpolation flap.  The donor area for the pedicle flap was then injected with anesthesia.  The flap was excised through the skin and subcutaneous tissue down to the layer of the underlying musculature.  The pedicle flap was carefully excised within this deep plane to maintain its blood supply.  The edges of the donor site were undermined.   The donor site was closed in a primary fashion.  The pedicle was then rotated into position and sutured.  Once the tube was sutured into place, adequate blood supply was confirmed with blanching and refill.  The pedicle was then wrapped with xeroform gauze and dressed appropriately with a telfa and gauze bandage to ensure continued blood supply and protect the attached pedicle. Paramedian Forehead Flap Text: A decision was made to reconstruct the defect utilizing an interpolation axial flap and a staged reconstruction.  A telfa template was made of the defect.  This telfa template was then used to outline the paramedian forehead pedicle flap.  The donor area for the pedicle flap was then injected with anesthesia.  The flap was excised through the skin and subcutaneous tissue down to the layer of the underlying musculature.  The pedicle flap was carefully excised within this deep plane to maintain its blood supply.  The edges of the donor site were undermined.   The donor site was closed in a primary fashion.  The pedicle was then rotated into position and sutured.  Once the tube was sutured into place, adequate blood supply was confirmed with blanching and refill.  The pedicle was then wrapped with xeroform gauze and dressed appropriately with a telfa and gauze bandage to ensure continued blood supply and protect the attached pedicle. Lip Wedge Excision Repair Text: Given the location of the defect and the proximity to free margins a full thickness wedge repair was deemed most appropriate.  Using a sterile surgical marker, the appropriate repair was drawn incorporating the defect and placing the expected incisions perpendicular to the vermilion border.  The vermilion border was also meticulously outlined to ensure appropriate reapproximation during the repair.  The area thus outlined was incised through and through with a #15 scalpel blade.  The muscularis and dermis were reaproximated with deep sutures following hemostasis. Care was taken to realign the vermilion border before proceeding with the superficial closure.  Once the vermilion was realigned the superfical and mucosal closure was finished. Ftsg Text: The defect edges were debeveled with a #15 scalpel blade.  Given the location of the defect, shape of the defect and the proximity to free margins a full thickness skin graft was deemed most appropriate.  Using a sterile surgical marker, the primary defect shape was transferred to the donor site. The area thus outlined was incised deep to adipose tissue with a #15 scalpel blade.  The harvested graft was then trimmed of adipose tissue until only dermis and epidermis was left.  The skin margins of the secondary defect were undermined to an appropriate distance in all directions utilizing iris scissors.  The secondary defect was closed with interrupted buried subcutaneous sutures.  The skin edges were then re-apposed with running  sutures.  The skin graft was then placed in the primary defect and oriented appropriately. Split-Thickness Skin Graft Text: The defect edges were debeveled with a #15 scalpel blade.  Given the location of the defect, shape of the defect and the proximity to free margins a split thickness skin graft was deemed most appropriate.  Using a sterile surgical marker, the primary defect shape was transferred to the donor site. The split thickness graft was then harvested.  The skin graft was then placed in the primary defect and oriented appropriately. Pinch Graft Text: The defect edges were debeveled with a #15 scalpel blade. Given the location of the defect, shape of the defect and the proximity to free margins a pinch graft was deemed most appropriate. Using a sterile surgical marker, the primary defect shape was transferred to the donor site. The area thus outlined was incised deep to adipose tissue with a #15 scalpel blade.  The harvested graft was then trimmed of adipose tissue until only dermis and epidermis was left. The skin margins of the secondary defect were undermined to an appropriate distance in all directions utilizing iris scissors.  The secondary defect was closed with interrupted buried subcutaneous sutures.  The skin edges were then re-apposed with running  sutures.  The skin graft was then placed in the primary defect and oriented appropriately. Burow's Graft Text: The defect edges were debeveled with a #15 scalpel blade.  Given the location of the defect, shape of the defect, the proximity to free margins and the presence of a standing cone deformity a Burow's skin graft was deemed most appropriate. The standing cone was removed and this tissue was then trimmed to the shape of the primary defect. The adipose tissue was also removed until only dermis and epidermis were left.  The skin margins of the secondary defect were undermined to an appropriate distance in all directions utilizing iris scissors.  The secondary defect was closed with interrupted buried subcutaneous sutures.  The skin edges were then re-apposed with running  sutures.  The skin graft was then placed in the primary defect and oriented appropriately. Cartilage Graft Text: The defect edges were debeveled with a #15 scalpel blade.  Given the location of the defect, shape of the defect, the fact the defect involved a full thickness cartilage defect a cartilage graft was deemed most appropriate.  An appropriate donor site was identified, cleansed, and anesthetized. The cartilage graft was then harvested and transferred to the recipient site, oriented appropriately and then sutured into place.  The secondary defect was then repaired using a primary closure. Composite Graft Text: The defect edges were debeveled with a #15 scalpel blade.  Given the location of the defect, shape of the defect, the proximity to free margins and the fact the defect was full thickness a composite graft was deemed most appropriate.  The defect was outline and then transferred to the donor site.  A full thickness graft was then excised from the donor site. The graft was then placed in the primary defect, oriented appropriately and then sutured into place.  The secondary defect was then repaired using a primary closure. Epidermal Autograft Text: The defect edges were debeveled with a #15 scalpel blade.  Given the location of the defect, shape of the defect and the proximity to free margins an epidermal autograft was deemed most appropriate.  Using a sterile surgical marker, the primary defect shape was transferred to the donor site. The epidermal graft was then harvested.  The skin graft was then placed in the primary defect and oriented appropriately. Dermal Autograft Text: The defect edges were debeveled with a #15 scalpel blade.  Given the location of the defect, shape of the defect and the proximity to free margins a dermal autograft was deemed most appropriate.  Using a sterile surgical marker, the primary defect shape was transferred to the donor site. The area thus outlined was incised deep to adipose tissue with a #15 scalpel blade.  The harvested graft was then trimmed of adipose and epidermal tissue until only dermis was left.  The skin graft was then placed in the primary defect and oriented appropriately. Skin Substitute Text: The defect edges were debeveled with a #15 scalpel blade.  Given the location of the defect, shape of the defect and the proximity to free margins a skin substitute graft was deemed most appropriate.  The graft material was trimmed to fit the size of the defect. The graft was then placed in the primary defect and oriented appropriately. Tissue Cultured Epidermal Autograft Text: The defect edges were debeveled with a #15 scalpel blade.  Given the location of the defect, shape of the defect and the proximity to free margins a tissue cultured epidermal autograft was deemed most appropriate.  The graft was then trimmed to fit the size of the defect.  The graft was then placed in the primary defect and oriented appropriately. Xenograft Text: The defect edges were debeveled with a #15 scalpel blade.  Given the location of the defect, shape of the defect and the proximity to free margins a xenograft was deemed most appropriate.  The graft was then trimmed to fit the size of the defect.  The graft was then placed in the primary defect and oriented appropriately. Purse String (Intermediate) Text: Given the location of the defect and the characteristics of the surrounding skin a purse string intermediate closure was deemed most appropriate.  Undermining was performed circumferentially around the surgical defect.  A purse string suture was then placed and tightened. Purse String (Simple) Text: Given the location of the defect and the characteristics of the surrounding skin a purse string simple closure was deemed most appropriate.  Undermining was performed circumferentially around the surgical defect.  A purse string suture was then placed and tightened. Complex Repair And Single Advancement Flap Text: The defect edges were debeveled with a #15 scalpel blade.  The primary defect was closed partially with a complex linear closure.  Given the location of the remaining defect, shape of the defect and the proximity to free margins a single advancement flap was deemed most appropriate for complete closure of the defect.  Using a sterile surgical marker, an appropriate advancement flap was drawn incorporating the defect and placing the expected incisions within the relaxed skin tension lines where possible.    The area thus outlined was incised deep to adipose tissue with a #15 scalpel blade.  The skin margins were undermined to an appropriate distance in all directions utilizing iris scissors. Complex Repair And Double Advancement Flap Text: The defect edges were debeveled with a #15 scalpel blade.  The primary defect was closed partially with a complex linear closure.  Given the location of the remaining defect, shape of the defect and the proximity to free margins a double advancement flap was deemed most appropriate for complete closure of the defect.  Using a sterile surgical marker, an appropriate advancement flap was drawn incorporating the defect and placing the expected incisions within the relaxed skin tension lines where possible.    The area thus outlined was incised deep to adipose tissue with a #15 scalpel blade.  The skin margins were undermined to an appropriate distance in all directions utilizing iris scissors. Complex Repair And Modified Advancement Flap Text: The defect edges were debeveled with a #15 scalpel blade.  The primary defect was closed partially with a complex linear closure.  Given the location of the remaining defect, shape of the defect and the proximity to free margins a modified advancement flap was deemed most appropriate for complete closure of the defect.  Using a sterile surgical marker, an appropriate advancement flap was drawn incorporating the defect and placing the expected incisions within the relaxed skin tension lines where possible.    The area thus outlined was incised deep to adipose tissue with a #15 scalpel blade.  The skin margins were undermined to an appropriate distance in all directions utilizing iris scissors. Complex Repair And A-T Advancement Flap Text: The defect edges were debeveled with a #15 scalpel blade.  The primary defect was closed partially with a complex linear closure.  Given the location of the remaining defect, shape of the defect and the proximity to free margins an A-T advancement flap was deemed most appropriate for complete closure of the defect.  Using a sterile surgical marker, an appropriate advancement flap was drawn incorporating the defect and placing the expected incisions within the relaxed skin tension lines where possible.    The area thus outlined was incised deep to adipose tissue with a #15 scalpel blade.  The skin margins were undermined to an appropriate distance in all directions utilizing iris scissors. Complex Repair And O-T Advancement Flap Text: The defect edges were debeveled with a #15 scalpel blade.  The primary defect was closed partially with a complex linear closure.  Given the location of the remaining defect, shape of the defect and the proximity to free margins an O-T advancement flap was deemed most appropriate for complete closure of the defect.  Using a sterile surgical marker, an appropriate advancement flap was drawn incorporating the defect and placing the expected incisions within the relaxed skin tension lines where possible.    The area thus outlined was incised deep to adipose tissue with a #15 scalpel blade.  The skin margins were undermined to an appropriate distance in all directions utilizing iris scissors. Complex Repair And O-L Flap Text: The defect edges were debeveled with a #15 scalpel blade.  The primary defect was closed partially with a complex linear closure.  Given the location of the remaining defect, shape of the defect and the proximity to free margins an O-L flap was deemed most appropriate for complete closure of the defect.  Using a sterile surgical marker, an appropriate flap was drawn incorporating the defect and placing the expected incisions within the relaxed skin tension lines where possible.    The area thus outlined was incised deep to adipose tissue with a #15 scalpel blade.  The skin margins were undermined to an appropriate distance in all directions utilizing iris scissors. Complex Repair And Bilobe Flap Text: The defect edges were debeveled with a #15 scalpel blade.  The primary defect was closed partially with a complex linear closure.  Given the location of the remaining defect, shape of the defect and the proximity to free margins a bilobe flap was deemed most appropriate for complete closure of the defect.  Using a sterile surgical marker, an appropriate advancement flap was drawn incorporating the defect and placing the expected incisions within the relaxed skin tension lines where possible.    The area thus outlined was incised deep to adipose tissue with a #15 scalpel blade.  The skin margins were undermined to an appropriate distance in all directions utilizing iris scissors. Complex Repair And Melolabial Flap Text: The defect edges were debeveled with a #15 scalpel blade.  The primary defect was closed partially with a complex linear closure.  Given the location of the remaining defect, shape of the defect and the proximity to free margins a melolabial flap was deemed most appropriate for complete closure of the defect.  Using a sterile surgical marker, an appropriate advancement flap was drawn incorporating the defect and placing the expected incisions within the relaxed skin tension lines where possible.    The area thus outlined was incised deep to adipose tissue with a #15 scalpel blade.  The skin margins were undermined to an appropriate distance in all directions utilizing iris scissors. Complex Repair And Rotation Flap Text: The defect edges were debeveled with a #15 scalpel blade.  The primary defect was closed partially with a complex linear closure.  Given the location of the remaining defect, shape of the defect and the proximity to free margins a rotation flap was deemed most appropriate for complete closure of the defect.  Using a sterile surgical marker, an appropriate advancement flap was drawn incorporating the defect and placing the expected incisions within the relaxed skin tension lines where possible.    The area thus outlined was incised deep to adipose tissue with a #15 scalpel blade.  The skin margins were undermined to an appropriate distance in all directions utilizing iris scissors. Complex Repair And Rhombic Flap Text: The defect edges were debeveled with a #15 scalpel blade.  The primary defect was closed partially with a complex linear closure.  Given the location of the remaining defect, shape of the defect and the proximity to free margins a rhombic flap was deemed most appropriate for complete closure of the defect.  Using a sterile surgical marker, an appropriate advancement flap was drawn incorporating the defect and placing the expected incisions within the relaxed skin tension lines where possible.    The area thus outlined was incised deep to adipose tissue with a #15 scalpel blade.  The skin margins were undermined to an appropriate distance in all directions utilizing iris scissors. Complex Repair And Transposition Flap Text: The defect edges were debeveled with a #15 scalpel blade.  The primary defect was closed partially with a complex linear closure.  Given the location of the remaining defect, shape of the defect and the proximity to free margins a transposition flap was deemed most appropriate for complete closure of the defect.  Using a sterile surgical marker, an appropriate advancement flap was drawn incorporating the defect and placing the expected incisions within the relaxed skin tension lines where possible.    The area thus outlined was incised deep to adipose tissue with a #15 scalpel blade.  The skin margins were undermined to an appropriate distance in all directions utilizing iris scissors. Complex Repair And V-Y Plasty Text: The defect edges were debeveled with a #15 scalpel blade.  The primary defect was closed partially with a complex linear closure.  Given the location of the remaining defect, shape of the defect and the proximity to free margins a V-Y plasty was deemed most appropriate for complete closure of the defect.  Using a sterile surgical marker, an appropriate advancement flap was drawn incorporating the defect and placing the expected incisions within the relaxed skin tension lines where possible.    The area thus outlined was incised deep to adipose tissue with a #15 scalpel blade.  The skin margins were undermined to an appropriate distance in all directions utilizing iris scissors. Complex Repair And M Plasty Text: The defect edges were debeveled with a #15 scalpel blade.  The primary defect was closed partially with a complex linear closure.  Given the location of the remaining defect, shape of the defect and the proximity to free margins an M plasty was deemed most appropriate for complete closure of the defect.  Using a sterile surgical marker, an appropriate advancement flap was drawn incorporating the defect and placing the expected incisions within the relaxed skin tension lines where possible.    The area thus outlined was incised deep to adipose tissue with a #15 scalpel blade.  The skin margins were undermined to an appropriate distance in all directions utilizing iris scissors. Complex Repair And Double M Plasty Text: The defect edges were debeveled with a #15 scalpel blade.  The primary defect was closed partially with a complex linear closure.  Given the location of the remaining defect, shape of the defect and the proximity to free margins a double M plasty was deemed most appropriate for complete closure of the defect.  Using a sterile surgical marker, an appropriate advancement flap was drawn incorporating the defect and placing the expected incisions within the relaxed skin tension lines where possible.    The area thus outlined was incised deep to adipose tissue with a #15 scalpel blade.  The skin margins were undermined to an appropriate distance in all directions utilizing iris scissors. Complex Repair And W Plasty Text: The defect edges were debeveled with a #15 scalpel blade.  The primary defect was closed partially with a complex linear closure.  Given the location of the remaining defect, shape of the defect and the proximity to free margins a W plasty was deemed most appropriate for complete closure of the defect.  Using a sterile surgical marker, an appropriate advancement flap was drawn incorporating the defect and placing the expected incisions within the relaxed skin tension lines where possible.    The area thus outlined was incised deep to adipose tissue with a #15 scalpel blade.  The skin margins were undermined to an appropriate distance in all directions utilizing iris scissors. Complex Repair And Z Plasty Text: The defect edges were debeveled with a #15 scalpel blade.  The primary defect was closed partially with a complex linear closure.  Given the location of the remaining defect, shape of the defect and the proximity to free margins a Z plasty was deemed most appropriate for complete closure of the defect.  Using a sterile surgical marker, an appropriate advancement flap was drawn incorporating the defect and placing the expected incisions within the relaxed skin tension lines where possible.    The area thus outlined was incised deep to adipose tissue with a #15 scalpel blade.  The skin margins were undermined to an appropriate distance in all directions utilizing iris scissors. Complex Repair And Dorsal Nasal Flap Text: The defect edges were debeveled with a #15 scalpel blade.  The primary defect was closed partially with a complex linear closure.  Given the location of the remaining defect, shape of the defect and the proximity to free margins a dorsal nasal flap was deemed most appropriate for complete closure of the defect.  Using a sterile surgical marker, an appropriate flap was drawn incorporating the defect and placing the expected incisions within the relaxed skin tension lines where possible.    The area thus outlined was incised deep to adipose tissue with a #15 scalpel blade.  The skin margins were undermined to an appropriate distance in all directions utilizing iris scissors. Complex Repair And Ftsg Text: The defect edges were debeveled with a #15 scalpel blade.  The primary defect was closed partially with a complex linear closure.  Given the location of the defect, shape of the defect and the proximity to free margins a full thickness skin graft was deemed most appropriate to repair the remaining defect.  The graft was trimmed to fit the size of the remaining defect.  The graft was then placed in the primary defect, oriented appropriately, and sutured into place. Complex Repair And Burow's Graft Text: The defect edges were debeveled with a #15 scalpel blade.  The primary defect was closed partially with a complex linear closure.  Given the location of the defect, shape of the defect, the proximity to free margins and the presence of a standing cone deformity a Burow's graft was deemed most appropriate to repair the remaining defect.  The graft was trimmed to fit the size of the remaining defect.  The graft was then placed in the primary defect, oriented appropriately, and sutured into place. Complex Repair And Split-Thickness Skin Graft Text: The defect edges were debeveled with a #15 scalpel blade.  The primary defect was closed partially with a complex linear closure.  Given the location of the defect, shape of the defect and the proximity to free margins a split thickness skin graft was deemed most appropriate to repair the remaining defect.  The graft was trimmed to fit the size of the remaining defect.  The graft was then placed in the primary defect, oriented appropriately, and sutured into place. Complex Repair And Epidermal Autograft Text: The defect edges were debeveled with a #15 scalpel blade.  The primary defect was closed partially with a complex linear closure.  Given the location of the defect, shape of the defect and the proximity to free margins an epidermal autograft was deemed most appropriate to repair the remaining defect.  The graft was trimmed to fit the size of the remaining defect.  The graft was then placed in the primary defect, oriented appropriately, and sutured into place. Complex Repair And Dermal Autograft Text: The defect edges were debeveled with a #15 scalpel blade.  The primary defect was closed partially with a complex linear closure.  Given the location of the defect, shape of the defect and the proximity to free margins an dermal autograft was deemed most appropriate to repair the remaining defect.  The graft was trimmed to fit the size of the remaining defect.  The graft was then placed in the primary defect, oriented appropriately, and sutured into place. Complex Repair And Tissue Cultured Epidermal Autograft Text: The defect edges were debeveled with a #15 scalpel blade.  The primary defect was closed partially with a complex linear closure.  Given the location of the defect, shape of the defect and the proximity to free margins an tissue cultured epidermal autograft was deemed most appropriate to repair the remaining defect.  The graft was trimmed to fit the size of the remaining defect.  The graft was then placed in the primary defect, oriented appropriately, and sutured into place. Complex Repair And Xenograft Text: The defect edges were debeveled with a #15 scalpel blade.  The primary defect was closed partially with a complex linear closure.  Given the location of the defect, shape of the defect and the proximity to free margins a xenograft was deemed most appropriate to repair the remaining defect.  The graft was trimmed to fit the size of the remaining defect.  The graft was then placed in the primary defect, oriented appropriately, and sutured into place. Complex Repair And Skin Substitute Graft Text: The defect edges were debeveled with a #15 scalpel blade.  The primary defect was closed partially with a complex linear closure.  Given the location of the remaining defect, shape of the defect and the proximity to free margins a skin substitute graft was deemed most appropriate to repair the remaining defect.  The graft was trimmed to fit the size of the remaining defect.  The graft was then placed in the primary defect, oriented appropriately, and sutured into place. Consent was obtained from the patient. The risks and benefits to therapy were discussed in detail. Specifically, the risks of infection, scarring, bleeding, prolonged wound healing, incomplete removal, allergy to anesthesia, nerve injury and recurrence were addressed. Prior to the procedure, the treatment site was clearly identified and confirmed by the patient. All components of Universal Protocol/PAUSE Rule completed. Render Post-Care Instructions In Note?: yes Post-Care Instructions: I reviewed with the patient in detail post-care instructions. Patient is not to engage in any heavy lifting, exercise, or swimming for the next 14 days. Should the patient develop any fevers, chills, bleeding, severe pain patient will contact the office immediately. Home Suture Removal Text: Patient was provided a home suture removal kit and will remove their sutures at home.  If they have any questions or difficulties they will call the office. Where Do You Want The Question To Include Opioid Counseling Located?: Case Summary Tab Billing Type: Third-Party Bill Information: Selecting Yes will display possible errors in your note based on the variables you have selected. This validation is only offered as a suggestion for you. PLEASE NOTE THAT THE VALIDATION TEXT WILL BE REMOVED WHEN YOU FINALIZE YOUR NOTE. IF YOU WANT TO FAX A PRELIMINARY NOTE YOU WILL NEED TO TOGGLE THIS TO 'NO' IF YOU DO NOT WANT IT IN YOUR FAXED NOTE.

## 2024-03-06 NOTE — H&P ADULT - PROBLEM SELECTOR PLAN 3
What Type Of Note Output Would You Prefer (Optional)?: Bullet Format Hpi Title: Evaluation of Skin Lesions hx of vaginal bleeding, refused obgyn assessment at [facility]  Hx of bladder cancer likely causing bleeding   consider OBGYN consult if patient changes mind

## 2024-04-15 NOTE — ED ADULT NURSE NOTE - COVID-19 RESULT DATE/TIME
[employer] Med[employer] BiV ICD - no atrial lead   Patient of [facility]    Battery 6.5 years    Presenting rhythm BV    A Impedance >3000  RV Impedance 399  LV Impedance 285    RV shock 61    P wave sensing -  R wave sensing 6.4    A Threshold - @ -  A Amplitude - @ -  RV Thresholds 0.875 @ 0.40  RV Amplitude 2.0 @ 0.40  LV Thresholds 0.75 @ 1  LV Amplitude 1.25 @ 1    A Paced 0%  V Paced 99.4%    Programmed Mode VVIR 70    Episodes :   none    Optivol WNL    
Noted.  
23-Feb-2022 20:25

## 2024-04-22 NOTE — ED ADULT NURSE NOTE - NS ED PATIENT SAFETY CONCERN
Arron Granado MD  Cardiology Fellow  [phone]  All Cardiology service information can be found 24/7 on [email or web address], password: [email or web address]    Patient seen and evaluated at bedside    HPI:   56M w/ PMH of asthma presenting with dyspnea and dizziness. Had a viral illness about a month ago, then a week ago had some SOB on exertion and dizziness on standing. Was seen at a cardiologist's office where he said he had an EKG that showed RBBB. He was ordered to go for a CTPE, has not gotten it yet. Came to the ED because he felt more dyspneic while walking to dinner. In the ED, EKG showing CHB with RBBB escape, rate of 40. Labs w/ WBC 13, TSH 4.13, neg trop, lactate 2.2. Not on AV darcy blockade agents. BP 140s systolic.    Allergies:  No Known Allergies      REVIEW OF SYSTEMS:  CONSTITUTIONAL: No weakness, fevers or chills  EYES/ENT: No visual changes;  No dysphagia  NECK: No pain or stiffness  RESPIRATORY: No cough, wheezing, hemoptysis; No shortness of breath  CARDIOVASCULAR: No chest pain or palpitations; No lower extremity edema  GASTROINTESTINAL: No abdominal or epigastric pain. No nausea, vomiting, or hematemesis; No diarrhea or constipation. No melena or hematochezia.  BACK: No back pain  GENITOURINARY: No dysuria, frequency or hematuria  NEUROLOGICAL: No numbness or weakness  SKIN: No itching, burning, rashes, or lesions   All other review of systems is negative unless indicated above.    Physical Exam:  T(F): 97.6 (04-22), Max: 97.6 (04-22)  HR: 66 (04-22) (40 - 66)  BP: 143/63 (04-22) (117/74 - 143/63)  RR: 16 (04-22)  SpO2: 98% (04-22)  GEN: NAD  HEENT: EOMI, clear sclera  PULM: CTA b/l, no wheeze  CV: slow heart rate  ABD: S, NT, ND  EXT: WWP, no edema  PSYCH: normal affect  SKIN: No rash    CXR: Personally reviewed    Labs: Personally reviewed                        14.8   15.33 )-----------( 174      ( 22 Apr 2024 20:29 )             43.7     04-22    144  |  113<H>  |  11  ----------------------------<  87  3.9   |  27  |  1.00    Ca    8.4<L>      22 Apr 2024 18:50  Mg     2.2     04-22    TPro  5.9<L>  /  Alb  3.3  /  TBili  0.7  /  DBili  x   /  AST  22  /  ALT  105<H>  /  AlkPhos  69  04-22    PT/INR - ( 22 Apr 2024 18:50 )   PT: 11.3 sec;   INR: 0.96 ratio         PTT - ( 22 Apr 2024 18:50 )  PTT:27.1 sec    CARDIAC MARKERS ( 22 Apr 2024 18:50 )  x     / x     / x     / 81 U/L / x     / <1.0 ng/mL              Thyroid Stimulating Hormone, Serum: 4.13 uIU/mL (04-22 @ 18:50)     No

## 2024-04-29 NOTE — H&P ADULT - ASSESSMENT
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  Diagnosed with Chronic Back Pain in the ED.

## 2024-05-02 NOTE — ED POST DISCHARGE NOTE - DETAILS
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

## 2024-05-20 NOTE — BH CONSULTATION LIAISON ASSESSMENT NOTE - RISK ASSESSMENT
15 Risk factors: chronic pain, acute/chronic medical issues    Protective factors: no current SIIP/HIIP, no h/o SA/SIB, no h/o psych admissions, no active substance abuse, domiciled, social supports, help-seeking behaviors, future-orientation    Overall, pt is at low acute risk of harm and does not meet criteria for psychiatric admission at this time.

## 2024-07-19 NOTE — DISCHARGE NOTE PROVIDER - NSRESEARCHGRANT_HIDDEN_GEN_A_CORE
SPIRITUAL HEALTH SERVICES - [facility] Encounter    Name: Ping Dawson                  Referral: Routine Visit    Sacraments  Anointed (Last Rites): Yes  Apostolic Jenison: No  Confession: No  Communion: No     Assessment:  Patient receptive to  visit.      Intervention:   provided spiritual support and sacramental ministry for patient.     Outcome:  Patient expressed gratitude for visit.    Plan:  Chaplains will remain available to offer spiritual and emotional support as needed.      Electronically signed by Chaplain Charles, on 7/19/2024 at 3:46 PM.  Spiritual Care Department  [facility]  [phone]    Yes

## 2024-08-06 NOTE — ED ADULT NURSE NOTE - NS_SISCREENINGSR_GEN_ALL_ED
Abdulkadir travis - [facility] Surg ([address] Medicine  Progress Note    Patient Name: Dariel Urbina  MRN: [id number]  Patient Class: IP- Inpatient   Admission Date: 8/5/2024  Length of Stay: 1 days  Attending Physician: Suzanne Colunga MD  Primary Care Provider: eDvon Langston Jr., MD        Subjective:     Principal Problem:Recurrent epistaxis        HPI:  Patient is an 84 year old male with a PMHx of CHF, CKD, mechanical heart valve on Coumadin, recurrent epistaxis, anemia, NSTEMI, PVD and type 2 diabetes being admitted to medicine for recurrent epistaxis. Patient reports that epistaxis has been a recurrent issue for some time now. He is established with an ENT.  Patient states that he has daily nosebleeds a typically manages had home with compression with Afrin. Today he reports that his bleeding started approximately 10:00 p.m last night. He reports using almost half a bottle of Afrin to try to get the bleeding to stop and applying compression, but has been unsuccessful. Patient states that this amount of bleeding is unusual as it is causing him to gag and cough up the blood.  He states typically he is able to easily swallow clots that are dislodged. He does endorse some lightheadedness. He denies any chest pain or difficulty breathing.  He denies any abdominal pain, however he does endorse black tarry stools.  He has no other acute concerns at this time. Patient has an extensive surgical history attempting to resolve this issue.  Per patient and wife he has undergone multiple cauteries and ablation of blood vessels without success.    In the ED, vitals stable. Intake labs remarkable for Hgb 6.4, INR 2.8, Cr 2.1. He was type & screened, transfused 1 unit RBCs. ENT consulted and planning for IR embolization tomorrow on 8/6.    Overview/Hospital Course:  Patient admitted for recurrent significant epistaxis requiring blood transfusions. Warfarin held on 8/5. Required 2nd unit of blood on 8/6 prior to going  to OR for embolization.     Interval History: patient feeling well this morning. Reports his pain is well controlled. Denies chest pain, SOB, n/v, c/d. Per nursing patient had dark BM overnight.       Review of Systems  Objective:     Vital Signs (Most Recent):  Temp: 97.1 °F (36.2 °C) (08/06/24 1225)  Pulse: (!) 53 (08/06/24 1225)  Resp: 18 (08/06/24 1225)  BP: (!) 183/70 (08/06/24 1225)  SpO2: (!) 94 % (08/06/24 1225) Vital Signs (24h Range):  Temp:  [97.1 °F (36.2 °C)-99 °F (37.2 °C)] 97.1 °F (36.2 °C)  Pulse:  [53-67] 53  Resp:  [17-20] 18  SpO2:  [94 %-100 %] 94 %  BP: (127-183)/() 183/70     Weight: 69.3 kg (152 lb 12.5 oz)  Body mass index is 25.42 kg/m².    Intake/Output Summary (Last 24 hours) at 8/6/2024 1310  Last data filed at 8/6/2024 1225  Gross per 24 hour   Intake 862 ml   Output --   Net 862 ml         Physical Exam  Vitals and nursing note reviewed.   Constitutional:       General: He is not in acute distress.     Appearance: He is well-developed.   HENT:      Head: Normocephalic and atraumatic.      Nose:      Right Nostril: Epistaxis present.      Left Nostril: Epistaxis present.      Comments: Rhinorockets in place, bilateral     Mouth/Throat:      Pharynx: No oropharyngeal exudate.   Eyes:      Conjunctiva/sclera: Conjunctivae normal.      Pupils: Pupils are equal, round, and reactive to light.   Cardiovascular:      Rate and Rhythm: Normal rate and regular rhythm.      Heart sounds: Normal heart sounds.   Pulmonary:      Effort: Pulmonary effort is normal. No respiratory distress.      Breath sounds: Normal breath sounds. No wheezing.   Abdominal:      General: Bowel sounds are normal. There is no distension.      Palpations: Abdomen is soft.      Tenderness: There is no abdominal tenderness.   Musculoskeletal:         General: No tenderness. Normal range of motion.      Cervical back: Normal range of motion and neck supple.   Lymphadenopathy:      Cervical: No cervical adenopathy.    Skin:     General: Skin is warm and dry.      Capillary Refill: Capillary refill takes less than 2 seconds.      Findings: No rash.   Neurological:      Mental Status: He is alert and oriented to person, place, and time.      Cranial Nerves: No cranial nerve deficit.      Sensory: No sensory deficit.      Coordination: Coordination normal.   Psychiatric:         Behavior: Behavior normal.         Thought Content: Thought content normal.         Judgment: Judgment normal.             Significant Labs: All pertinent labs within the past 24 hours have been reviewed.  CBC:   Recent Labs   Lab 08/05/24 0409 08/06/24 0223   WBC 7.22 5.26   HGB 6.4* 6.7*   HCT 21.5* 22.7*    142*     CMP:   Recent Labs   Lab 08/05/24 0409 08/06/24 0223    139   K 4.3 4.4    112*   CO2 21* 19*   * 74   BUN 48* 49*   CREATININE 2.1* 1.8*   CALCIUM 9.8 9.4   PROT 6.6  --    ALBUMIN 2.9*  --    BILITOT 0.4  --    ALKPHOS 78  --    AST 16  --    ALT 14  --    ANIONGAP 9 8       Significant Imaging: I have reviewed all pertinent imaging results/findings within the past 24 hours.    Assessment/Plan:      * Recurrent epistaxis  Acute blood loss anemia    Patient presents with recurrent epistaxis, follows closely outpatient with ENT. Hgb 6.4 on presentation. Given 1u pRBC on 6/5. Second unit on 8/6.   - trend CBC  - Afrin q8h bilateral nares + q4h saline spray to packing  - Keflex for staph coverage while packing in place  - ENT consulted  - planning for IR embolization 8/6      Acute blood loss anemia  Anemia is likely due to acute blood loss which was from epistaxis . Most recent hemoglobin and hematocrit are listed below.  Recent Labs     08/05/24 0409 08/06/24 0223   HGB 6.4* 6.7*   HCT 21.5* 22.7*     Plan  - Monitor serial CBC: Daily  - Transfuse PRBC if patient becomes hemodynamically unstable, symptomatic or H/H drops below 7/21.  - Patient has received 2 units of PRBCs on 8/5, 8/6  - holding  warfarin    Paroxysmal atrial fibrillation  Patient with Paroxysmal (<7 days) atrial fibrillation which is controlled currently with Beta Blocker. Patient is currently in sinus rhythm.[id number] Score: 4.  Anticoagulation indicated. Anticoagulation done with coumadin .      Type 2 diabetes mellitus with stage 4 chronic kidney disease, without long-term current use of insulin  Creatine stable for now. BMP reviewed- noted Estimated Creatinine Clearance: 23.6 mL/min (A) (based on SCr of 2.1 mg/dL (H)). according to latest data. Based on current GFR, CKD stage is stage 4 - GFR 15-29.  Monitor UOP and serial BMP and adjust therapy as needed. Renally dose meds. Avoid nephrotoxic medications and procedures.  - low dose SSI, ACHS  - diabetic diet    Chronic anticoagulation  On coumadin for mechanical heart valve  - daily INR  - pharmacy consulted  - will need heparin if INR drops below 2        VTE Risk Mitigation (From admission, onward)           Ordered     IP VTE LOW RISK PATIENT  Once         08/05/24 0941     Place sequential compression device  Until discontinued         08/05/24 0941                    Discharge Planning   ASTER: 8/9/2024     Code Status: Full Code   Is the patient medically ready for discharge?:     Reason for patient still in hospital (select all that apply): Patient trending condition, Treatment, and Consult recommendations  Discharge Plan A: Home with family                  Suzanne Colunga MD  Department of Hospital Medicine   [facility] - Med Surg ([place])     Negative

## 2024-08-09 NOTE — ED ADULT TRIAGE NOTE - HISTORY OF COVID-19 VACCINATION
T H I S   I S    N O  T   A    F I N A L I Z E D   N O T E      LATRICIA ARREAGA  81y, Female  Allergy: aspirin (Rash; Other)  penicillins (Rash; Urticaria; Hives; Blisters)  latex (Unknown)  EKG leads (Hives)    Hospital Day: 11d    Patient seen and examined earlier today.     PMH/PSH:  PAST MEDICAL & SURGICAL HISTORY:  HTN (hypertension)      Heart murmur      Eczema      Anemia  iron infusions and PRBC transfusions      Aortic stenosis      Chronic kidney disease (CKD)      2019 novel coronavirus disease (COVID-19)  2020- REHAB admission      Gastric AVM      H/O appendicitis      S/P cholecystectomy      History of esophagogastroduodenoscopy (EGD)      H/O colonoscopy      H/O  section      History of appendectomy      Port-A-Cath in place  right CW          LAST 24-Hr EVENTS:    VITALS:  T(F): 97 (24 @ 08:22), Max: 98.3 (24 @ 23:57)  HR: 62 (24 @ 08:22)  BP: 98/43 (24 @ 08:22) (98/43 - 116/65)  RR: 18 (24 @ 08:22)  SpO2: 97% (24 @ 08:22)          TESTS & MEASUREMENTS:  Weight/BMI  101 (24 @ 12:35)    24 @ 07:01  -  24 @ 07:00  --------------------------------------------------------  IN: 250 mL / OUT: 1300 mL / NET: -1050 mL    24 @ 07:01  -  24 @ 14:06  --------------------------------------------------------  IN: 0 mL / OUT: 700 mL / NET: -700 mL                            7.3    6.33  )-----------( 178      ( 09 Aug 2024 07:43 )             24.4         08-    142  |  108  |  107<HH>  ----------------------------<  102<H>  5.3<H>   |  23  |  2.6<H>    Ca    8.3<L>      09 Aug 2024 07:43  Phos  5.3     08-  Mg     2.7     08-    TPro  4.9<L>  /  Alb  2.4<L>  /  TBili  0.4  /  DBili  x   /  AST  19  /  ALT  12  /  AlkPhos  73  08-09    LIVER FUNCTIONS - ( 09 Aug 2024 07:43 )  Alb: 2.4 g/dL / Pro: 4.9 g/dL / ALK PHOS: 73 U/L / ALT: 12 U/L / AST: 19 U/L / GGT: x                 Urinalysis Basic - ( 09 Aug 2024 07:43 )    Color: x / Appearance: x / SG: x / pH: x  Gluc: 102 mg/dL / Ketone: x  / Bili: x / Urobili: x   Blood: x / Protein: x / Nitrite: x   Leuk Esterase: x / RBC: x / WBC x   Sq Epi: x / Non Sq Epi: x / Bacteria: x          Ferritin: 170 ng/mL (24 @ 14:29)                    RADIOLOGY, ECG, & ADDITIONAL TESTS:  12 Lead ECG:   Ventricular Rate 78 BPM    Atrial Rate 78 BPM    P-R Interval 186 ms    QRS Duration 94 ms    Q-T Interval 438 ms    QTC Calculation(Bazett) 499 ms    P Axis 70 degrees    R Axis -42 degrees    T Axis -35 degrees    Diagnosis Line Sinus rhythm withfrequent Premature ventricular complexes  Left axis deviation  Incomplete right bundle branch block  Minimal voltage criteria for LVH, may be normal variant  ST & T wave abnormality, consider inferior ischemia  Abnormal ECG    Confirmed by El Zhao ([id number]) on 2024 6:30:32 PM (24 @ 16:05)    CT Head No Cont:   ACC: [id number] EXAM:  CT BRAIN   ORDERED BY: ENA LIRIANO     PROCEDURE DATE:  2024          INTERPRETATION:  Clinical History / Reason for exam: Weakness and   sleepiness.    Technique: Noncontrast head CT.  Contiguous CT axial images of the head   from the base to the vertex with coronal and sagittal reformats.    Comparison/correlation: CT head 2009    Findings:    The ventricles and cortical sulci are normal in size and configuration.    There is no acute intracranial hemorrhage, extra-axial fluid collection   or midline shift.  Gray-white matter differentiation is maintained.    The visualized paranasal sinuses and mastoids are clear.    IMPRESSION:    No evidence of acute intracranial pathology.    --- End of Report ---            GILLES PETERSEN MD; Attending Radiologist  This document has been electronically signed. Aug  8 2024  3:06PM (24 @ 14:44)    RECENT DIAGNOSTIC ORDERS:  Xray Ankle Complete 3 Views, Right: Routine   Indication: r/o fracture  Transport: Wheelchair (24 @ 14:05)  Ammonia, Serum: 16:00 (24 @ 14:01)  CT Abdomen and Pelvis No Cont: Routine   Indication: rule out intra and retro peritoneal bleed  Transport: Wheelchair  Exam Completed (24 @ 08:17)      MEDICATIONS:  MEDICATIONS  (STANDING):  chlorhexidine 2% Cloths 1 Application(s) Topical daily  folic acid 1 milliGRAM(s) Oral daily  gabapentin 100 milliGRAM(s) Oral daily  heparin   Injectable 5000 Unit(s) SubCutaneous every 12 hours  lidocaine 1%/epinephrine 1:100,000 Inj 3 Vial(s) Local Injection once  metoprolol tartrate 12.5 milliGRAM(s) Oral every 12 hours  pantoprazole    Tablet 40 milliGRAM(s) Oral before breakfast  polyethylene glycol 3350 17 Gram(s) Oral daily  senna 2 Tablet(s) Oral at bedtime  sodium zirconium cyclosilicate 10 Gram(s) Oral daily    MEDICATIONS  (PRN):  acetaminophen     Tablet .. 650 milliGRAM(s) Oral every 6 hours PRN Mild Pain (1 - 3)      HOME MEDICATIONS:  furosemide 40 mg oral tablet ()  metoprolol tartrate 25 mg oral tablet ()  pantoprazole 40 mg oral delayed release tablet ()      PHYSICAL EXAM:  GENERAL:   CHEST/LUNG:   HEART:   ABDOMEN:   EXTREMITIES:             LATRICIA ARREAGA  81y, Female  Allergy: aspirin (Rash; Other)  penicillins (Rash; Urticaria; Hives; Blisters)  latex (Unknown)  EKG leads (Hives)    Hospital Day: 11d    Patient seen and examined earlier today. she c/o of abd pain , she still sleeps easy but easy to wake up , later she stated that her pain is better   PMH/PSH:  PAST MEDICAL & SURGICAL HISTORY:  HTN (hypertension)      Heart murmur      Eczema      Anemia  iron infusions and PRBC transfusions      Aortic stenosis      Chronic kidney disease (CKD)      2019 novel coronavirus disease (COVID-19)  2020- REHAB admission      Gastric AVM      H/O appendicitis      S/P cholecystectomy      History of esophagogastroduodenoscopy (EGD)      H/O colonoscopy      H/O  section      History of appendectomy      Port-A-Cath in place  right CW          LAST 24-Hr EVENTS:    VITALS:  T(F): 97 (24 @ 08:22), Max: 98.3 (24 @ 23:57)  HR: 62 (24 @ 08:22)  BP: 98/43 (24 @ 08:22) (98/43 - 116/65)  RR: 18 (24 @ 08:22)  SpO2: 97% (24 @ 08:22)          TESTS & MEASUREMENTS:  Weight/BMI  101 (24 @ 12:35)    24 @ 07:01  -  24 @ 07:00  --------------------------------------------------------  IN: 250 mL / OUT: 1300 mL / NET: -1050 mL    24 @ 07:01  -  24 @ 14:06  --------------------------------------------------------  IN: 0 mL / OUT: 700 mL / NET: -700 mL                            7.3    6.33  )-----------( 178      ( 09 Aug 2024 07:43 )             24.4         08-    142  |  108  |  107<HH>  ----------------------------<  102<H>  5.3<H>   |  23  |  2.6<H>    Ca    8.3<L>      09 Aug 2024 07:43  Phos  5.3     08  Mg     2.7         TPro  4.9<L>  /  Alb  2.4<L>  /  TBili  0.4  /  DBili  x   /  AST  19  /  ALT  12  /  AlkPhos  73  08-09    LIVER FUNCTIONS - ( 09 Aug 2024 07:43 )  Alb: 2.4 g/dL / Pro: 4.9 g/dL / ALK PHOS: 73 U/L / ALT: 12 U/L / AST: 19 U/L / GGT: x                 Urinalysis Basic - ( 09 Aug 2024 07:43 )    Color: x / Appearance: x / SG: x / pH: x  Gluc: 102 mg/dL / Ketone: x  / Bili: x / Urobili: x   Blood: x / Protein: x / Nitrite: x   Leuk Esterase: x / RBC: x / WBC x   Sq Epi: x / Non Sq Epi: x / Bacteria: x          Ferritin: 170 ng/mL (24 @ 14:29)                    RADIOLOGY, ECG, & ADDITIONAL TESTS:  12 Lead ECG:   Ventricular Rate 78 BPM    Atrial Rate 78 BPM    P-R Interval 186 ms    QRS Duration 94 ms    Q-T Interval 438 ms    QTC Calculation(Bazett) 499 ms    P Axis 70 degrees    R Axis -42 degrees    T Axis -35 degrees    Diagnosis Line Sinus rhythm withfrequent Premature ventricular complexes  Left axis deviation  Incomplete right bundle branch block  Minimal voltage criteria for LVH, may be normal variant  ST & T wave abnormality, consider inferior ischemia  Abnormal ECG    Confirmed by El Zhao ([id number]) on 2024 6:30:32 PM (24 @ 16:05)    CT Head No Cont:   ACC: [id number] EXAM:  CT BRAIN   ORDERED BY: ENA LIRIANO     PROCEDURE DATE:  2024          INTERPRETATION:  Clinical History / Reason for exam: Weakness and   sleepiness.    Technique: Noncontrast head CT.  Contiguous CT axial images of the head   from the base to the vertex with coronal and sagittal reformats.    Comparison/correlation: CT head 2009    Findings:    The ventricles and cortical sulci are normal in size and configuration.    There is no acute intracranial hemorrhage, extra-axial fluid collection   or midline shift.  Gray-white matter differentiation is maintained.    The visualized paranasal sinuses and mastoids are clear.    IMPRESSION:    No evidence of acute intracranial pathology.    --- End of Report ---            GILLES PETERSEN MD; Attending Radiologist  This document has been electronically signed. Aug  8 2024  3:06PM (24 @ 14:44)    RECENT DIAGNOSTIC ORDERS:  Xray Ankle Complete 3 Views, Right: Routine   Indication: r/o fracture  Transport: Wheelchair (24 @ 14:05)  Ammonia, Serum: 16:00 (24 @ 14:01)  CT Abdomen and Pelvis No Cont: Routine   Indication: rule out intra and retro peritoneal bleed  Transport: Wheelchair  Exam Completed (24 @ 08:17)      MEDICATIONS:  MEDICATIONS  (STANDING):  chlorhexidine 2% Cloths 1 Application(s) Topical daily  folic acid 1 milliGRAM(s) Oral daily  gabapentin 100 milliGRAM(s) Oral daily  heparin   Injectable 5000 Unit(s) SubCutaneous every 12 hours  lidocaine 1%/epinephrine 1:100,000 Inj 3 Vial(s) Local Injection once  metoprolol tartrate 12.5 milliGRAM(s) Oral every 12 hours  pantoprazole    Tablet 40 milliGRAM(s) Oral before breakfast  polyethylene glycol 3350 17 Gram(s) Oral daily  senna 2 Tablet(s) Oral at bedtime  sodium zirconium cyclosilicate 10 Gram(s) Oral daily    MEDICATIONS  (PRN):  acetaminophen     Tablet .. 650 milliGRAM(s) Oral every 6 hours PRN Mild Pain (1 - 3)      HOME MEDICATIONS:  furosemide 40 mg oral tablet ()  metoprolol tartrate 25 mg oral tablet ()  pantoprazole 40 mg oral delayed release tablet ()      PHYSICAL EXAM:  On exam  General: sleepy  but arousal easy , answer questions properly and follows simple commands like moving her UE and LE , chronic ill appearance  Lungs:  clear to ausculation b/l, normal resp effort, Port-A-Cath in place, no tender or erythema or discharge   Heart: regular ryhthm   Abdomen: soft, mild tender non distended + BS   Ext: + edema, can move all  his extremities   neuro : grossly non focal ,     No

## 2024-08-27 NOTE — DIETITIAN INITIAL EVALUATION ADULT - PROBLEM SELECTOR PROBLEM 4
Reason for Visit  Luis Panda is a 63 y.o. female w/PMH s/f polycystic kidney disease referred by Dr. Luciano for thrombocytopenia prior to being  placed on kidney transplant list.     Upon review of labs, noted to have thrombocytopenia since 2021 in the 's. Normocytic-macrocytic anemia. Normal WBC.    Patient presents with son and . Translation done by son. On assessment, reports long standing h/o constipation and chronic shoulder pain otherwise feeling well. Denies bleeding or bruising.     PMH/PSH: PKD, DMII, HTN, HL, fistula left arm.  FH: Denies FH of cancers, bleeding or clotting disorder.   Soc Hx: former smoker, denies ETOH, illicit drugs; , 4 children.    History of Present Illness:  Patient presents for follow up accompanied by grandson and . Patient reports starting Promacta 25 mg ~1 month ago, tolerating well. On assessment, patient feeling well and denies any bleeding or excessive bruising.     Review of Systems: All of the systems have been reviewed and are negative for complaints except what is stated in the HPI and/or past medical history.    Allergies and Intolerances:  Allergies   Allergen Reactions    Amlodipine Unknown     LEG SWELLING    Oxycodone Unknown     Dizziness, weakness            Current Outpatient Medications   Medication Sig Dispense Refill    calcitriol (Rocaltrol) 0.25 mcg capsule Take 1 capsule (0.25 mcg) by mouth once daily.      carvedilol (Coreg) 25 mg tablet Take 1 tablet (25 mg) by mouth 2 times a day with meals.      cholecalciferol (Vitamin D-3) 50 MCG (2000 UT) tablet Take 1 tablet (50 mcg) by mouth once daily.      eltrombopag olamine (Promacta) 12.5 mg tablet Take 1 tablet (12.5 mg) by mouth once daily in the morning. Take before meals. Take on an empty stomach, 1 hour before or 2 hours afer a meal. 30 tablet 3    ferrous sulfate, 325 mg ferrous sulfate, tablet Take 1 tablet by mouth 3 times a day.      glipiZIDE (Glucotrol) 5 mg  "tablet Take 1 tablet (5 mg) by mouth once daily.      hydrALAZINE (Apresoline) 25 mg tablet Take 1 tablet (25 mg) by mouth 3 times a day.      omeprazole (PriLOSEC) 40 mg DR capsule Take 1 capsule (40 mg) by mouth once daily.      ondansetron ODT (Zofran-ODT) 4 mg disintegrating tablet 1 tablet (4 mg) every 6 hours.      simvastatin (Zocor) 20 mg tablet Take 1 tablet (20 mg) by mouth once daily.      sodium bicarbonate 650 mg tablet Take 1 tablet (650 mg) by mouth.      traZODone (Desyrel) 50 mg tablet Take 1 tablet (50 mg) by mouth as needed at bedtime for sleep.      TRUEplus Lancets 33 gauge misc        No current facility-administered medications for this visit.        Vitals and Measurements:       2/28/2024     9:30 AM 2/28/2024    10:00 AM 2/28/2024    10:15 AM 2/28/2024    10:29 AM 2/28/2024    11:15 AM 6/3/2024    11:22 AM 8/26/2024    10:37 AM   Vitals   Systolic 168 173 175 178 180 145 167   Diastolic 93 91 92 90 89 79 84   Heart Rate 74 72 71 73 72 76 76   Temp      36.6 °C (97.9 °F) 36.6 °C (97.9 °F)   Resp 14 14 12 14 16 18 17   Height (in)      1.58 m (5' 2.21\")     Weight (lb)      155.76 153.77   BMI      28.3 kg/m2 27.94 kg/m2   BSA (m2)      1.76 m2 1.75 m2   Visit Report      Report Report       Significant value     Physical Exam:   Constitutional: alert, awake and oriented, not in acute distress   HEENT: moist mucous membranes, normal nose   Neck: supple, no lymphadenopathy   EYES: PERRL, EOM intact, conjunctiva normal  Skin: no jaundice, rash or erythema  Neurological: AAOx3, no gross focal deficit   Psychiatric: normal mood and behavior      Labs:  Lab Results   Component Value Date    WBC 6.8 08/26/2024    NEUTROABS 4.85 08/26/2024    IGABSOL 0.00 08/26/2024    LYMPHSABS 1.08 (L) 08/26/2024    MONOSABS 0.69 08/26/2024    EOSABS 0.11 08/26/2024    BASOSABS 0.03 08/26/2024    RBC 2.85 (L) 08/26/2024     (H) 08/26/2024    MCHC 31.6 (L) 08/26/2024    HGB 9.1 (L) 08/26/2024    HCT 28.8 (L) " "08/26/2024     08/26/2024     No results found for: \"RETICCTPCT\"   Lab Results   Component Value Date    CREATININE 6.30 (H) 08/26/2024    BUN 40 (H) 08/26/2024    EGFR 7 (L) 08/26/2024     08/26/2024    K 4.6 08/26/2024     (H) 08/26/2024    CO2 18 (L) 08/26/2024      Lab Results   Component Value Date    ALT 22 08/26/2024    AST 22 08/26/2024    ALKPHOS 59 08/26/2024    BILITOT 0.8 08/26/2024      Assessment:    62 y.o. female w/PMH s/f polycystic kidney disease referred for thrombocytopenia prior to being placed on kidney transplant list.     Discussed Bmbx c/w ITP and doesn't require treatment for now but if in future needs surgery with higher platelet count can offer TPO-RA (Avatrombopag).     Discussed starting patient on Promacta to get platelets to 100. Discussed potential side effects and cost.     Plan:    Reviewed and discussed lab, imaging, and pathology results with patient in detail as well as diagnosis, prognosis, and treatment options.    Platelet count 307 discussed lowering dosage of Promacta to 12.5 mg daily. New Rx sent.    Will need closer monitoring of platelet count and Promacta.    F/U w/PCP and renal    RTC in 2 weeks     Patient verbalized understanding, and all her questions were answered to her satisfaction    30 min spent with patient greater than 50 % of which was spent in consultation and coordination of care.  " HLD (hyperlipidemia)

## 2024-10-15 NOTE — DISCHARGE NOTE PROVIDER - DISCHARGE DIET
Problem: Adult Inpatient Plan of Care  Goal: Plan of Care Review  10/15/2024 1551 by Arlet Lezama RN  Outcome: Met  10/15/2024 1551 by Arlet Lezama RN  Outcome: Progressing  Goal: Patient-Specific Goal (Individualized)  10/15/2024 1551 by Arlet Lezama RN  Outcome: Met  10/15/2024 1551 by Arlet Lezama RN  Outcome: Progressing  Goal: Absence of Hospital-Acquired Illness or Injury  10/15/2024 1551 by Arlet Lezama RN  Outcome: Met  10/15/2024 1551 by Arlet Lezama RN  Outcome: Progressing  Goal: Optimal Comfort and Wellbeing  10/15/2024 1551 by Arlet Lezama RN  Outcome: Met  10/15/2024 1551 by Arlet Lezama RN  Outcome: Progressing  Goal: Readiness for Transition of Care  10/15/2024 1551 by Arlet Lezama RN  Outcome: Met  10/15/2024 1551 by Arlet Lezama RN  Outcome: Progressing      Regular Diet - No restrictions

## 2024-10-24 NOTE — ASU PATIENT PROFILE, ADULT - PROVIDER NOTIFICATION
[facility] NEPHROLOGY- CONSULTATION NOTE    77y Male with history of below presents for new AVG placement. Nephrology consulted for hyperkalemia.    Patient well known to our practice for h/o ESRD on HD MWF for which he follows with Dr. Nielson at [facility] [place]. Last HD on 10/21 as an outpatient. OR cancelled this morning due to hyperkalemia.    REVIEW OF SYSTEMS:  Gen: no fevers  HEENT: no rhinorrhea  Neck: no sore throat  Cards: no chest pain  Resp: no dyspnea  GI: no nausea or vomiting or diarrhea  : no dysuria or hematuria  Vascular: no LE edema  Derm: no rashes  Neuro: no numbness/tingling    No Known Allergies      Home Medications Reviewed  Hospital Medications:   MEDICATIONS  (STANDING):  heparin  Infusion 1500 Unit(s)/Hr (15 mL/Hr) IV Continuous <Continuous>  pantoprazole  Injectable 40 milliGRAM(s) IV Push daily      PAST MEDICAL & SURGICAL HISTORY:  COPD (chronic obstructive pulmonary disease)      Acute MI  2007 s/p AICD placement      Type II diabetes mellitus      Gout      MI (myocardial infarction)      Systolic CHF, chronic      H/O aortic valve stenosis      HTN (hypertension)      History of prostate cancer      Chronic kidney disease (CKD)      CAD (coronary artery disease)      ESRD on dialysis      Anemia of chronic disease      HLD (hyperlipidemia)      FAIZAN on CPAP      Atrial fibrillation      Venous insufficiency      GERD (gastroesophageal reflux disease)      Prostate cancer      Polyarthritis      H/O ventricular tachycardia      H/O: GI bleed      PAD (peripheral artery disease)      History of hepatitis C      NAFLD (nonalcoholic fatty liver disease)      Poor historian      S/P TAVR (transcatheter aortic valve replacement)  July 2018      S/P cholecystectomy  2006      S/P ICD (internal cardiac defibrillator) procedure      Enteroscopy finding          FAMILY HISTORY:  Family history of coronary artery disease in mother    Family history of diabetes mellitus in grandmother (Grandparent)    Family history of hypertension in father    FH: heart disease        SOCIAL HISTORY:  Denies toxic substance use     VITALS:  T(F): 98.4 (10-23-24 @ 10:13), Max: 98.4 (10-23-24 @ 10:13)  HR: 70 (10-23-24 @ 11:02)  BP: 140/71 (10-23-24 @ 11:02)  RR: 18 (10-23-24 @ 11:02)  SpO2: 99% (10-23-24 @ 11:02)  Wt(kg): --    Height (cm): 176.5 (10-23 @ 10:13), 176.5 (10-23 @ 08:35)  Weight (kg): 84.8 (10-23 @ 10:13), 89.4 (10-23 @ 08:35)  BMI (kg/m2): 27.2 (10-23 @ 10:13), 28.7 (10-23 @ 08:35)  BSA (m2): 2.02 (10-23 @ 10:13), 2.06 (10-23 @ 08:35)    PHYSICAL EXAM:  Gen: NAD, calm  HEENT: MMM  Neck: no JVD  Cards: RRR, +S1/S2, no M/G/R  Resp: CTA B/L  GI: soft, NT/ND, NABS  : no CVA tenderness  Vascular: trace LE edema B/L, RIJ TDC intact, RUE AVF without bruit/thrill  Derm: no rashes  Neuro: non-focal    LABS:  10-23    138  |  105  |  60[H]  ----------------------------<  71  6.2[HH]   |  21[L]  |  4.14[H]    Ca    8.1[L]      23 Oct 2024 11:00    TPro  6.7  /  Alb  3.2[L]  /  TBili  0.3  /  DBili      /  AST  36  /  ALT  9   /  AlkPhos  94  10-23    Creatinine Trend: 4.14 <--                        11.9   3.02  )-----------( 85       ( 23 Oct 2024 11:00 )             37.4     Urine Studies:  Urinalysis Basic - ( 23 Oct 2024 11:00 )    Color:  / Appearance:  / SG:  / pH:   Gluc: 71 mg/dL / Ketone:   / Bili:  / Urobili:    Blood:  / Protein:  / Nitrite:    Leuk Esterase:  / RBC:  / WBC    Sq Epi:  / Non Sq Epi:  / Bacteria:           RADIOLOGY & ADDITIONAL STUDIES:    N/A
HPI:  78 y/o male with ESRD on HD M-W-F via Right IJ permcath, PAD, Venous Insuffiencey,  Atrial Fibrillation on Eliquis, COPD,  Anemia of chronic disease, Chronic Hepatitis C, Gout, Prostate cancer, BPH, CAD s/p PTCA, GERD, HTN, HLD, FAIZAN on CPAP, Aortic Stenosis s/p TAVR, CHF, and VT s/p AICD/PPM placement in 12/2021 presents for right arm atrioventricular graft. for  chronically occluded right arm AVF. Patient was found to have hyperkalemia and case was cancelled.     In the ED, patient is afebrile HD stable, and saturating well on RA  (23 Oct 2024 11:19)      14 point ROS otherwise negative    PAST MEDICAL & SURGICAL HISTORY:  COPD (chronic obstructive pulmonary disease)      Acute MI  2007 s/p AICD placement      Type II diabetes mellitus      Gout      MI (myocardial infarction)      Systolic CHF, chronic      H/O aortic valve stenosis      HTN (hypertension)      History of prostate cancer      Chronic kidney disease (CKD)      CAD (coronary artery disease)      ESRD on dialysis      Anemia of chronic disease      HLD (hyperlipidemia)      FAIZAN on CPAP      Atrial fibrillation      Venous insufficiency      GERD (gastroesophageal reflux disease)      Prostate cancer      Polyarthritis      H/O ventricular tachycardia      H/O: GI bleed      PAD (peripheral artery disease)      History of hepatitis C      NAFLD (nonalcoholic fatty liver disease)      Poor historian      S/P TAVR (transcatheter aortic valve replacement)  July 2018      S/P cholecystectomy  2006      S/P ICD (internal cardiac defibrillator) procedure      Enteroscopy finding          Allergies    No Known Allergies    Intolerances        MEDICATIONS  (STANDING):  acetaminophen   IVPB .. 1000 milliGRAM(s) IV Intermittent every 6 hours  chlorhexidine 2% Cloths 1 Application(s) Topical daily  dextrose 5%. 1000 milliLiter(s) (50 mL/Hr) IV Continuous <Continuous>  dextrose 5%. 1000 milliLiter(s) (100 mL/Hr) IV Continuous <Continuous>  dextrose 50% Injectable 12.5 Gram(s) IV Push once  dextrose 50% Injectable 25 Gram(s) IV Push once  dextrose 50% Injectable 25 Gram(s) IV Push once  glucagon  Injectable 1 milliGRAM(s) IntraMuscular once  insulin lispro (ADMELOG) corrective regimen sliding scale   SubCutaneous every 6 hours  pantoprazole  Injectable 40 milliGRAM(s) IV Push daily    MEDICATIONS  (PRN):  dextrose Oral Gel 15 Gram(s) Oral once PRN Blood Glucose LESS THAN 70 milliGRAM(s)/deciliter  sodium chloride 0.9% Bolus. 100 milliLiter(s) IV Bolus every 5 minutes PRN SBP LESS THAN or EQUAL to 90 mmHg      FAMILY HISTORY:  Family history of coronary artery disease in mother    Family history of diabetes mellitus in grandmother (Grandparent)    Family history of hypertension in father    FH: heart disease        SOCIAL HISTORY: No EtOH, no tobacco        VITALS:   T(F): 98.6 (10-24-24 @ 11:15), Max: 98.6 (10-24-24 @ 11:15)  HR: 70 (10-24-24 @ 11:15)  BP: 120/61 (10-24-24 @ 11:15)  RR: 16 (10-24-24 @ 11:15)  SpO2: 98% (10-24-24 @ 11:15)  Wt(kg): --    PHYSICAL EXAM    GENERAL: NAD, well-developed  HEAD:  Atraumatic, Normocephalic  EYES: EOMI, PERRLA, conjunctiva and sclera clear  NECK: Supple, No JVD  CHEST/LUNG: Clear to auscultation bilaterally; No wheeze  HEART: Regular rate and rhythm; No murmurs, rubs, or gallops  ABDOMEN: Soft, Nontender, Nondistended; Bowel sounds present  EXTREMITIES:  2+ Peripheral Pulses, No clubbing, cyanosis, or edema. RUE AVF without bruit/thrill  NEUROLOGY: non-focal  SKIN: No rashes or lesions    LABS:                         12.1   3.22  )-----------( 89       ( 24 Oct 2024 04:30 )             37.8     10-24    140  |  102  |  35[H]  ----------------------------<  94  4.5   |  25  |  3.20[H]    Ca    8.7      24 Oct 2024 04:30  Phos  3.4     10-24  Mg     2.00     10-24    TPro  x   /  Alb  x   /  TBili  x   /  DBili  x   /  AST  x   /  ALT  8   /  AlkPhos  x   10-23    Phosphorus: 3.4 mg/dL (10-24 @ 04:30)  Magnesium: 2.00 mg/dL (10-24 @ 04:30)    PT/INR - ( 24 Oct 2024 04:30 )   PT: 12.0 sec;   INR: 1.03 ratio         PTT - ( 24 Oct 2024 04:30 )  PTT:58.9 sec  Clean Catch Clean Catch (Midstream)  10-02 @ 22:50   >100,000 CFU/ml Klebsiella pneumoniae  --  Klebsiella pneumoniae          IMAGING:
date of consult 10/24/24    HISTORY OF PRESENT ILLNESS: HPI:  76 y/o male well known to the office with ESRD on HD M-W-F via Right IJ permcath, PAD, Venous Insuffiencey,  Atrial Fibrillation on Eliquis, COPD,  Anemia of chronic disease, Chronic Hepatitis C, Gout, Prostate cancer, BPH, CAD s/p PTCA, GERD, HTN, HLD, FAIZAN on CPAP, Aortic Stenosis s/p TAVR, HFrEF and VT s/p AICD placement in 12/2021 presented with chronically occluded right arm AVF. Patient was found to have hyperkalemia and thrombocytopenia, so case is delayed.  Pt seen in PACU, awaiting vascular decision for OR timing.  Denies chest pain, SOB, palps.    PAST MEDICAL & SURGICAL HISTORY:  COPD (chronic obstructive pulmonary disease)      Acute MI  2007 s/p AICD placement      Type II diabetes mellitus      Gout      MI (myocardial infarction)      Systolic CHF, chronic      H/O aortic valve stenosis      HTN (hypertension)      History of prostate cancer      Chronic kidney disease (CKD)      CAD (coronary artery disease)      ESRD on dialysis      Anemia of chronic disease      HLD (hyperlipidemia)      FAIZAN on CPAP      Atrial fibrillation      Venous insufficiency      GERD (gastroesophageal reflux disease)      Prostate cancer      Polyarthritis      H/O ventricular tachycardia      H/O: GI bleed      PAD (peripheral artery disease)      History of hepatitis C      NAFLD (nonalcoholic fatty liver disease)      Poor historian      S/P TAVR (transcatheter aortic valve replacement)  July 2018      S/P cholecystectomy  2006      S/P ICD (internal cardiac defibrillator) procedure      Enteroscopy finding      MEDICATIONS  (STANDING):  acetaminophen   IVPB .. 1000 milliGRAM(s) IV Intermittent every 6 hours  atorvastatin 40 milliGRAM(s) Oral daily  chlorhexidine 2% Cloths 1 Application(s) Topical daily  dextrose 5%. 1000 milliLiter(s) (50 mL/Hr) IV Continuous <Continuous>  dextrose 5%. 1000 milliLiter(s) (100 mL/Hr) IV Continuous <Continuous>  dextrose 50% Injectable 25 Gram(s) IV Push once  dextrose 50% Injectable 25 Gram(s) IV Push once  dextrose 50% Injectable 12.5 Gram(s) IV Push once  finasteride 5 milliGRAM(s) Oral daily  glucagon  Injectable 1 milliGRAM(s) IntraMuscular once  insulin lispro (ADMELOG) corrective regimen sliding scale   SubCutaneous every 6 hours  montelukast 10 milliGRAM(s) Oral at bedtime  pantoprazole  Injectable 40 milliGRAM(s) IV Push daily  tamsulosin 0.4 milliGRAM(s) Oral at bedtime      Allergies  No Known Allergies        FAMILY HISTORY:  Family history of coronary artery disease in mother    Family history of diabetes mellitus in grandmother (Grandparent)    Family history of hypertension in father    FH: heart disease    Non-contributary for premature coronary disease or sudden cardiac death    SOCIAL HISTORY:    [x ] Non-smoker  [ ] Smoker  [ ] Alcohol    FLU VACCINE THIS YEAR STARTS IN AUGUST:  [ ] Yes    [ ] No    IF OVER 65 HAVE YOU EVER HAD A PNA VACCINE:  [ ] Yes    [ ] No       [ ] N/A      REVIEW OF SYSTEMS:  [ ]chest pain  [  ]shortness of breath  [  ]palpitations  [  ]syncope  [ ]near syncope [ ]upper extremity weakness   [ ] lower extremity weakness  [  ]diplopia  [  ]altered mental status   [  ]fevers  [ ]chills [ ]nausea  [ ]vomiting  [  ]dysphagia    [ ]abdominal pain  [ ]melena  [ ]BRBPR    [  ]epistaxis  [  ]rash    [ ]lower extremity edema        [x ] All others negative	  [ ] Unable to obtain      LABS:	 	                      12.1   3.22  )-----------( 89       ( 24 Oct 2024 04:30 )             37.8   140  |  102  |  35[H]  ----------------------------<  94  4.5   |  25  |  3.20[H]    Ca    8.7      24 Oct 2024 04:30  Phos  3.4     10-24  Mg     2.00     10-24    TPro  x   /  Alb  x   /  TBili  x   /  DBili  x   /  AST  x   /  ALT  8   /  AlkPhos  x   10-23    Creatinine Trend: 3.20<--, 4.74<--, 4.14<--, 3.33<--, 3.26<--    Coags:  PT/INR - ( 24 Oct 2024 04:30 )   PT: 12.0 sec;   INR: 1.03 ratio      PTT - ( 24 Oct 2024 04:30 )  PTT:58.9 sec    PHYSICAL EXAM:  T(C): 37 (10-24-24 @ 11:15), Max: 37 (10-24-24 @ 11:15)  HR: 70 (10-24-24 @ 11:15) (69 - 96)  BP: 120/61 (10-24-24 @ 11:15) (104/53 - 143/57)  RR: 16 (10-24-24 @ 11:15) (16 - 18)  SpO2: 98% (10-24-24 @ 11:15) (96% - 100%)  Wt(kg): --   BMI (kg/m2): 27.2 (10-23-24 @ 10:13)  I&O's Summary    23 Oct 2024 07:01  -  24 Oct 2024 07:00  --------------------------------------------------------  IN: 400 mL / OUT: 2600 mL / NET: -2200 mL    24 Oct 2024 07:01  -  24 Oct 2024 16:55  --------------------------------------------------------  IN: 0 mL / OUT: 300 mL / NET: -300 mL      Gen: Appears well in NAD  HEENT:  (-)icterus (-)pallor  CV: N S1 S2 1/6 CHUCK (+)2 Pulses B/l  Resp:  Clear to ausculatation B/L, normal effort  GI: (+) BS Soft, NT, ND  Lymph:  (-)Edema, (-)obvious lymphadenopathy  Skin: Warm to touch, Normal turgor  Psych: Appropriate mood and affect    DATA      < from: Transthoracic Echocardiogram (01.15.23 @ 07:35) >  CONCLUSIONS:  1. Normal mitral valve. Moderate mitral regurgitation.  2. A transcatheter aortic valve implant is visualized in  the aortic position. Gradients across the aortic valve were  not adequately assessed. Appears to open.  Trace  paravalvular aortic regurgitation.  Mild transvalvular  regurgitation.  3. Moderately dilated left atrium.  LA volume index = 42  cc/m2.  4. Moderately increased left ventricular wall thickness.  Dilated left ventricle. Differential includes hypertensive  cardiomyopathy, infiltrative cardiomyopathy, and  hypertrophic cardiomyopathy, among others. Consider cardiac  MRI if clinically indicated.  5. Mild segmental left ventricular systolic dysfunction  (LVEF 45% by biplane). Inferolateral wall is near akinetic.  Inferior and basal anterolateral walls are hypokinetic.  6. Diastolic dysfunction is present. Unable to adequately  assess severity of diastolic function due to technical  aspects of this study.  7. Normal right ventricular size and function. A device  lead is visualized in the right heart.  8. Incomplete tricuspid regurgitation jet precludes  accurate assessment of pulmonary artery systolic pressure.  9. Tricuspid valve not well seen. Trace tricuspid  regurgitation on limited views.    *** Compared with trans-esophageal echocardiogram report of  3/29/2021, current study is a TTE, LVEF is improved on  current study.  ------------------------------------------------------------------------  Confirmed on  1/16/2023 - 09:44:51 by Eula Sandoval MD    < end of copied text >    ASSESSMENT/PLAN: 	76 y/o male well known to the office with ESRD on HD M-W-F via Right IJ permcath, PAD, Venous Insuffiencey,  Atrial Fibrillation on Eliquis, COPD,  Anemia of chronic disease, Chronic Hepatitis C, Gout, Prostate cancer, BPH, CAD s/p PTCA, GERD, HTN, HLD, FAIZAN on CPAP, Aortic Stenosis s/p TAVR, HFrEF and VT s/p AICD placement in 12/2021 presented with chronically occluded right arm AVF.    --Eliquis on hold, on Heparin Gtt  --resume post op when stable  --not in clinical CHF  --optimized from CV perspective for OR  --resume home cardiac meds post op  --HD per Renal    Thank you for allowing us to participate in the care of our mutual patient.  Please do not hesitate to call with any questions.     Lissette GERMAN  [phone]                   
Declines

## 2024-11-07 NOTE — DIETITIAN INITIAL EVALUATION ADULT. - ETIOLOGY
Detail Level: Detailed
Quality 130: Documentation Of Current Medications In The Medical Record: Current Medications Documented
Bladder ca, R nephrostomy tube, Pyelonephritis,
Quality 226: Preventive Care And Screening: Tobacco Use: Screening And Cessation Intervention: Patient screened for tobacco use and is an ex/non-smoker

## 2024-11-14 NOTE — ED PROVIDER NOTE - PSYCHIATRIC, MLM
Alert and oriented to person, place, time/situation. normal mood and affect. no apparent risk to self or others. never true

## 2025-02-21 NOTE — ED ADULT NURSE NOTE - PAIN: PRESENCE, MLM
77 English speaking. denies pain/discomfort

## 2025-06-09 NOTE — ED ADULT TRIAGE NOTE - ESI TRIAGE ACUITY LEVEL, MLM
Spiritual Care Partner Volunteer attempted to visit patient at [facility] in [facility] EMERGENCY DEPARTMENT on 6/9/2025. Patient was asleep.   Documented by:  [facility]  To contact the  call: [phone] ([phone]   3

## 2025-06-13 NOTE — H&P ADULT - MLM HIDDEN
yes PAST MEDICAL HISTORY:  Bipolar 1 disorder     COPD (chronic obstructive pulmonary disease)     PTSD (post-traumatic stress disorder)     Unilateral primary osteoarthritis, left knee      PAST MEDICAL HISTORY:  Bipolar 1 disorder     COPD (chronic obstructive pulmonary disease)     PTSD (post-traumatic stress disorder)     Schizophrenia     Unilateral primary osteoarthritis, left knee

## 2025-06-14 NOTE — DISCHARGE NOTE PROVIDER - CARE PROVIDERS DIRECT ADDRESSES
Goal Outcome Evaluation:      Plan of Care Reviewed With: patient    Overall Patient Progress: no changeOverall Patient Progress: no change     Pt is alert and oriented x 4, on a room air, continent of bowel and bladder, last BM was on the 6/11/25, pt is on a contact guide assist with a cane. On a regular diet thin, pills whole, no straws. Pt is on a BG checks, pt denied chest pain, N/V, SOB, call light was within reach. Continue pt's plan of care.            [email or web address]
